# Patient Record
Sex: MALE | Race: BLACK OR AFRICAN AMERICAN | NOT HISPANIC OR LATINO | Employment: OTHER | URBAN - METROPOLITAN AREA
[De-identification: names, ages, dates, MRNs, and addresses within clinical notes are randomized per-mention and may not be internally consistent; named-entity substitution may affect disease eponyms.]

---

## 2022-01-01 ENCOUNTER — HOSPITAL ENCOUNTER (INPATIENT)
Facility: HOSPITAL | Age: 57
LOS: 9 days | Discharge: HOME | DRG: 291 | End: 2022-11-29
Attending: EMERGENCY MEDICINE | Admitting: HOSPITALIST
Payer: MEDICARE

## 2022-01-01 ENCOUNTER — PATIENT OUTREACH (OUTPATIENT)
Dept: ADMINISTRATIVE | Facility: CLINIC | Age: 57
End: 2022-01-01
Payer: MEDICARE

## 2022-01-01 ENCOUNTER — HOSPITAL ENCOUNTER (INPATIENT)
Facility: HOSPITAL | Age: 57
LOS: 2 days | Discharge: HOME | DRG: 150 | End: 2022-12-22
Attending: EMERGENCY MEDICINE | Admitting: EMERGENCY MEDICINE
Payer: MEDICARE

## 2022-01-01 VITALS
WEIGHT: 187 LBS | HEIGHT: 69 IN | OXYGEN SATURATION: 90 % | HEART RATE: 78 BPM | TEMPERATURE: 98 F | BODY MASS INDEX: 27.7 KG/M2 | DIASTOLIC BLOOD PRESSURE: 63 MMHG | RESPIRATION RATE: 18 BRPM | SYSTOLIC BLOOD PRESSURE: 132 MMHG

## 2022-01-01 VITALS
WEIGHT: 171.94 LBS | BODY MASS INDEX: 25.47 KG/M2 | HEIGHT: 69 IN | TEMPERATURE: 97 F | OXYGEN SATURATION: 91 % | HEART RATE: 71 BPM | DIASTOLIC BLOOD PRESSURE: 89 MMHG | SYSTOLIC BLOOD PRESSURE: 180 MMHG | RESPIRATION RATE: 17 BRPM

## 2022-01-01 DIAGNOSIS — E87.70 HYPERVOLEMIA, UNSPECIFIED HYPERVOLEMIA TYPE: ICD-10-CM

## 2022-01-01 DIAGNOSIS — I50.21 ACUTE HFREF (HEART FAILURE WITH REDUCED EJECTION FRACTION): ICD-10-CM

## 2022-01-01 DIAGNOSIS — J96.21 ACUTE ON CHRONIC RESPIRATORY FAILURE WITH HYPOXIA: Primary | ICD-10-CM

## 2022-01-01 DIAGNOSIS — N18.6 ESRD ON DIALYSIS: Primary | ICD-10-CM

## 2022-01-01 DIAGNOSIS — E78.5 HYPERLIPIDEMIA, UNSPECIFIED HYPERLIPIDEMIA TYPE: ICD-10-CM

## 2022-01-01 DIAGNOSIS — N18.6 TYPE 1 DIABETES MELLITUS WITH CHRONIC KIDNEY DISEASE ON CHRONIC DIALYSIS: ICD-10-CM

## 2022-01-01 DIAGNOSIS — I26.94 MULTIPLE SUBSEGMENTAL PULMONARY EMBOLI WITHOUT ACUTE COR PULMONALE: ICD-10-CM

## 2022-01-01 DIAGNOSIS — R07.9 CHEST PAIN: ICD-10-CM

## 2022-01-01 DIAGNOSIS — N18.9 CHRONIC KIDNEY DISEASE-MINERAL AND BONE DISORDER: ICD-10-CM

## 2022-01-01 DIAGNOSIS — Z99.2 TYPE 1 DIABETES MELLITUS WITH CHRONIC KIDNEY DISEASE ON CHRONIC DIALYSIS: ICD-10-CM

## 2022-01-01 DIAGNOSIS — M89.9 CHRONIC KIDNEY DISEASE-MINERAL AND BONE DISORDER: ICD-10-CM

## 2022-01-01 DIAGNOSIS — R06.02 SOB (SHORTNESS OF BREATH): ICD-10-CM

## 2022-01-01 DIAGNOSIS — E83.9 CHRONIC KIDNEY DISEASE-MINERAL AND BONE DISORDER: ICD-10-CM

## 2022-01-01 DIAGNOSIS — I15.0 RENOVASCULAR HYPERTENSION: ICD-10-CM

## 2022-01-01 DIAGNOSIS — R06.02 SHORTNESS OF BREATH: ICD-10-CM

## 2022-01-01 DIAGNOSIS — N18.6 ESRD ON DIALYSIS: ICD-10-CM

## 2022-01-01 DIAGNOSIS — E10.22 TYPE 1 DIABETES MELLITUS WITH CHRONIC KIDNEY DISEASE ON CHRONIC DIALYSIS: ICD-10-CM

## 2022-01-01 DIAGNOSIS — I50.21 ACUTE SYSTOLIC HEART FAILURE: ICD-10-CM

## 2022-01-01 DIAGNOSIS — Z99.2 ESRD ON DIALYSIS: ICD-10-CM

## 2022-01-01 DIAGNOSIS — Z99.2 ESRD ON DIALYSIS: Primary | ICD-10-CM

## 2022-01-01 LAB
ALBUMIN SERPL BCP-MCNC: 2.5 G/DL (ref 3.5–5.2)
ALBUMIN SERPL BCP-MCNC: 2.5 G/DL (ref 3.5–5.2)
ALBUMIN SERPL BCP-MCNC: 2.6 G/DL (ref 3.5–5.2)
ALBUMIN SERPL BCP-MCNC: 2.7 G/DL (ref 3.5–5.2)
ALLENS TEST: ABNORMAL
ALP SERPL-CCNC: 200 U/L (ref 55–135)
ALP SERPL-CCNC: 214 U/L (ref 55–135)
ALP SERPL-CCNC: 224 U/L (ref 55–135)
ALP SERPL-CCNC: 234 U/L (ref 55–135)
ALP SERPL-CCNC: 244 U/L (ref 55–135)
ALP SERPL-CCNC: 250 U/L (ref 55–135)
ALP SERPL-CCNC: 258 U/L (ref 55–135)
ALT SERPL W/O P-5'-P-CCNC: 22 U/L (ref 10–44)
ALT SERPL W/O P-5'-P-CCNC: 25 U/L (ref 10–44)
ALT SERPL W/O P-5'-P-CCNC: 26 U/L (ref 10–44)
ALT SERPL W/O P-5'-P-CCNC: 28 U/L (ref 10–44)
ALT SERPL W/O P-5'-P-CCNC: 29 U/L (ref 10–44)
ALT SERPL W/O P-5'-P-CCNC: 35 U/L (ref 10–44)
ALT SERPL W/O P-5'-P-CCNC: 36 U/L (ref 10–44)
ANION GAP SERPL CALC-SCNC: 10 MMOL/L (ref 8–16)
ANION GAP SERPL CALC-SCNC: 11 MMOL/L (ref 8–16)
ANION GAP SERPL CALC-SCNC: 11 MMOL/L (ref 8–16)
ANION GAP SERPL CALC-SCNC: 12 MMOL/L (ref 8–16)
ANION GAP SERPL CALC-SCNC: 13 MMOL/L (ref 8–16)
ANION GAP SERPL CALC-SCNC: 14 MMOL/L (ref 8–16)
ANION GAP SERPL CALC-SCNC: 15 MMOL/L (ref 8–16)
ANION GAP SERPL CALC-SCNC: 8 MMOL/L (ref 8–16)
ANION GAP SERPL CALC-SCNC: 9 MMOL/L (ref 8–16)
ANION GAP SERPL CALC-SCNC: 9 MMOL/L (ref 8–16)
APTT BLDCRRT: 49.7 SEC (ref 21–32)
ASCENDING AORTA: 3.44 CM
AST SERPL-CCNC: 27 U/L (ref 10–40)
AST SERPL-CCNC: 30 U/L (ref 10–40)
AST SERPL-CCNC: 35 U/L (ref 10–40)
AST SERPL-CCNC: 37 U/L (ref 10–40)
AST SERPL-CCNC: 37 U/L (ref 10–40)
AST SERPL-CCNC: 38 U/L (ref 10–40)
AST SERPL-CCNC: 91 U/L (ref 10–40)
AV INDEX (PROSTH): 0.63
AV MEAN GRADIENT: 9 MMHG
AV PEAK GRADIENT: 17 MMHG
AV VALVE AREA: 2.88 CM2
AV VELOCITY RATIO: 0.65
B-OH-BUTYR BLD STRIP-SCNC: 1.9 MMOL/L (ref 0–0.5)
BASOPHILS # BLD AUTO: 0.01 K/UL (ref 0–0.2)
BASOPHILS # BLD AUTO: 0.02 K/UL (ref 0–0.2)
BASOPHILS # BLD AUTO: 0.03 K/UL (ref 0–0.2)
BASOPHILS # BLD AUTO: 0.03 K/UL (ref 0–0.2)
BASOPHILS # BLD AUTO: 0.04 K/UL (ref 0–0.2)
BASOPHILS NFR BLD: 0.2 % (ref 0–1.9)
BASOPHILS NFR BLD: 0.3 % (ref 0–1.9)
BASOPHILS NFR BLD: 0.4 % (ref 0–1.9)
BASOPHILS NFR BLD: 0.5 % (ref 0–1.9)
BASOPHILS NFR BLD: 0.5 % (ref 0–1.9)
BASOPHILS NFR BLD: 0.7 % (ref 0–1.9)
BASOPHILS NFR BLD: 0.8 % (ref 0–1.9)
BASOPHILS NFR BLD: 0.9 % (ref 0–1.9)
BILIRUB SERPL-MCNC: 0.6 MG/DL (ref 0.1–1)
BILIRUB SERPL-MCNC: 0.6 MG/DL (ref 0.1–1)
BILIRUB SERPL-MCNC: 0.7 MG/DL (ref 0.1–1)
BNP SERPL-MCNC: 1701 PG/ML (ref 0–99)
BNP SERPL-MCNC: 2255 PG/ML (ref 0–99)
BNP SERPL-MCNC: 3024 PG/ML (ref 0–99)
BSA FOR ECHO PROCEDURE: 2.03 M2
BUN SERPL-MCNC: 23 MG/DL (ref 6–20)
BUN SERPL-MCNC: 29 MG/DL (ref 6–20)
BUN SERPL-MCNC: 30 MG/DL (ref 6–20)
BUN SERPL-MCNC: 32 MG/DL (ref 6–30)
BUN SERPL-MCNC: 33 MG/DL (ref 6–20)
BUN SERPL-MCNC: 36 MG/DL (ref 6–20)
BUN SERPL-MCNC: 36 MG/DL (ref 6–20)
BUN SERPL-MCNC: 38 MG/DL (ref 6–20)
BUN SERPL-MCNC: 41 MG/DL (ref 6–20)
BUN SERPL-MCNC: 44 MG/DL (ref 6–20)
BUN SERPL-MCNC: 46 MG/DL (ref 6–20)
CALCIUM SERPL-MCNC: 7.6 MG/DL (ref 8.7–10.5)
CALCIUM SERPL-MCNC: 7.6 MG/DL (ref 8.7–10.5)
CALCIUM SERPL-MCNC: 7.8 MG/DL (ref 8.7–10.5)
CALCIUM SERPL-MCNC: 7.9 MG/DL (ref 8.7–10.5)
CALCIUM SERPL-MCNC: 8 MG/DL (ref 8.7–10.5)
CALCIUM SERPL-MCNC: 8.1 MG/DL (ref 8.7–10.5)
CALCIUM SERPL-MCNC: 8.1 MG/DL (ref 8.7–10.5)
CALCIUM SERPL-MCNC: 8.2 MG/DL (ref 8.7–10.5)
CALCIUM SERPL-MCNC: 8.3 MG/DL (ref 8.7–10.5)
CHLORIDE SERPL-SCNC: 101 MMOL/L (ref 95–110)
CHLORIDE SERPL-SCNC: 101 MMOL/L (ref 95–110)
CHLORIDE SERPL-SCNC: 102 MMOL/L (ref 95–110)
CHLORIDE SERPL-SCNC: 104 MMOL/L (ref 95–110)
CHLORIDE SERPL-SCNC: 104 MMOL/L (ref 95–110)
CHLORIDE SERPL-SCNC: 92 MMOL/L (ref 95–110)
CHLORIDE SERPL-SCNC: 93 MMOL/L (ref 95–110)
CHLORIDE SERPL-SCNC: 94 MMOL/L (ref 95–110)
CHLORIDE SERPL-SCNC: 95 MMOL/L (ref 95–110)
CHLORIDE SERPL-SCNC: 97 MMOL/L (ref 95–110)
CHLORIDE SERPL-SCNC: 97 MMOL/L (ref 95–110)
CO2 SERPL-SCNC: 20 MMOL/L (ref 23–29)
CO2 SERPL-SCNC: 21 MMOL/L (ref 23–29)
CO2 SERPL-SCNC: 23 MMOL/L (ref 23–29)
CO2 SERPL-SCNC: 24 MMOL/L (ref 23–29)
CO2 SERPL-SCNC: 24 MMOL/L (ref 23–29)
CO2 SERPL-SCNC: 25 MMOL/L (ref 23–29)
CO2 SERPL-SCNC: 26 MMOL/L (ref 23–29)
CO2 SERPL-SCNC: 27 MMOL/L (ref 23–29)
CREAT SERPL-MCNC: 4.3 MG/DL (ref 0.5–1.4)
CREAT SERPL-MCNC: 4.3 MG/DL (ref 0.5–1.4)
CREAT SERPL-MCNC: 4.5 MG/DL (ref 0.5–1.4)
CREAT SERPL-MCNC: 4.6 MG/DL (ref 0.5–1.4)
CREAT SERPL-MCNC: 4.7 MG/DL (ref 0.5–1.4)
CREAT SERPL-MCNC: 4.7 MG/DL (ref 0.5–1.4)
CREAT SERPL-MCNC: 4.9 MG/DL (ref 0.5–1.4)
CREAT SERPL-MCNC: 4.9 MG/DL (ref 0.5–1.4)
CREAT SERPL-MCNC: 5 MG/DL (ref 0.5–1.4)
CREAT SERPL-MCNC: 5.3 MG/DL (ref 0.5–1.4)
CREAT SERPL-MCNC: 5.4 MG/DL (ref 0.5–1.4)
CREAT SERPL-MCNC: 5.5 MG/DL (ref 0.5–1.4)
CREAT SERPL-MCNC: 5.8 MG/DL (ref 0.5–1.4)
CV ECHO LV RWT: 0.45 CM
DIFFERENTIAL METHOD: ABNORMAL
DOP CALC AO PEAK VEL: 2.07 M/S
DOP CALC AO VTI: 41.89 CM
DOP CALC LVOT AREA: 4.6 CM2
DOP CALC LVOT DIAMETER: 2.41 CM
DOP CALC LVOT PEAK VEL: 1.35 M/S
DOP CALC LVOT STROKE VOLUME: 120.82 CM3
DOP CALCLVOT PEAK VEL VTI: 26.5 CM
E WAVE DECELERATION TIME: 156.12 MSEC
E/A RATIO: 1.11
E/E' RATIO: 21.64 M/S
ECHO LV POSTERIOR WALL: 1.18 CM (ref 0.6–1.1)
EJECTION FRACTION: 63 %
EOSINOPHIL # BLD AUTO: 0 K/UL (ref 0–0.5)
EOSINOPHIL # BLD AUTO: 0 K/UL (ref 0–0.5)
EOSINOPHIL # BLD AUTO: 0.1 K/UL (ref 0–0.5)
EOSINOPHIL NFR BLD: 0.6 % (ref 0–8)
EOSINOPHIL NFR BLD: 0.8 % (ref 0–8)
EOSINOPHIL NFR BLD: 0.9 % (ref 0–8)
EOSINOPHIL NFR BLD: 1.3 % (ref 0–8)
EOSINOPHIL NFR BLD: 1.4 % (ref 0–8)
EOSINOPHIL NFR BLD: 1.5 % (ref 0–8)
EOSINOPHIL NFR BLD: 1.6 % (ref 0–8)
EOSINOPHIL NFR BLD: 1.7 % (ref 0–8)
EOSINOPHIL NFR BLD: 1.8 % (ref 0–8)
ERYTHROCYTE [DISTWIDTH] IN BLOOD BY AUTOMATED COUNT: 17 % (ref 11.5–14.5)
ERYTHROCYTE [DISTWIDTH] IN BLOOD BY AUTOMATED COUNT: 17.1 % (ref 11.5–14.5)
ERYTHROCYTE [DISTWIDTH] IN BLOOD BY AUTOMATED COUNT: 17.2 % (ref 11.5–14.5)
ERYTHROCYTE [DISTWIDTH] IN BLOOD BY AUTOMATED COUNT: 17.3 % (ref 11.5–14.5)
ERYTHROCYTE [DISTWIDTH] IN BLOOD BY AUTOMATED COUNT: 17.3 % (ref 11.5–14.5)
ERYTHROCYTE [DISTWIDTH] IN BLOOD BY AUTOMATED COUNT: 17.4 % (ref 11.5–14.5)
ERYTHROCYTE [DISTWIDTH] IN BLOOD BY AUTOMATED COUNT: 17.5 % (ref 11.5–14.5)
ERYTHROCYTE [DISTWIDTH] IN BLOOD BY AUTOMATED COUNT: 17.9 % (ref 11.5–14.5)
EST. GFR  (NO RACE VARIABLE): 10.5 ML/MIN/1.73 M^2
EST. GFR  (NO RACE VARIABLE): 11.2 ML/MIN/1.73 M^2
EST. GFR  (NO RACE VARIABLE): 11.5 ML/MIN/1.73 M^2
EST. GFR  (NO RACE VARIABLE): 12.6 ML/MIN/1.73 M^2
EST. GFR  (NO RACE VARIABLE): 12.9 ML/MIN/1.73 M^2
EST. GFR  (NO RACE VARIABLE): 12.9 ML/MIN/1.73 M^2
EST. GFR  (NO RACE VARIABLE): 13.5 ML/MIN/1.73 M^2
EST. GFR  (NO RACE VARIABLE): 13.5 ML/MIN/1.73 M^2
EST. GFR  (NO RACE VARIABLE): 13.9 ML/MIN/1.73 M^2
EST. GFR  (NO RACE VARIABLE): 14.3 ML/MIN/1.73 M^2
EST. GFR  (NO RACE VARIABLE): 15.1 ML/MIN/1.73 M^2
EST. GFR  (NO RACE VARIABLE): 15.1 ML/MIN/1.73 M^2
ESTIMATED AVG GLUCOSE: 169 MG/DL (ref 68–131)
FRACTIONAL SHORTENING: 34 % (ref 28–44)
GLUCOSE SERPL-MCNC: 151 MG/DL (ref 70–110)
GLUCOSE SERPL-MCNC: 152 MG/DL (ref 70–110)
GLUCOSE SERPL-MCNC: 180 MG/DL (ref 70–110)
GLUCOSE SERPL-MCNC: 184 MG/DL (ref 70–110)
GLUCOSE SERPL-MCNC: 211 MG/DL (ref 70–110)
GLUCOSE SERPL-MCNC: 218 MG/DL (ref 70–110)
GLUCOSE SERPL-MCNC: 230 MG/DL (ref 70–110)
GLUCOSE SERPL-MCNC: 239 MG/DL (ref 70–110)
GLUCOSE SERPL-MCNC: 369 MG/DL (ref 70–110)
GLUCOSE SERPL-MCNC: 387 MG/DL (ref 70–110)
GLUCOSE SERPL-MCNC: 417 MG/DL (ref 70–110)
GLUCOSE SERPL-MCNC: 476 MG/DL (ref 70–110)
GLUCOSE SERPL-MCNC: 476 MG/DL (ref 70–110)
GLUCOSE SERPL-MCNC: 499 MG/DL (ref 70–110)
GLUCOSE SERPL-MCNC: 53 MG/DL (ref 70–110)
GLUCOSE SERPL-MCNC: 90 MG/DL (ref 70–110)
HBA1C MFR BLD: 7.5 % (ref 4–5.6)
HBV SURFACE AG SERPL QL IA: NORMAL
HCO3 UR-SCNC: 29.1 MMOL/L (ref 24–28)
HCT VFR BLD AUTO: 28.1 % (ref 40–54)
HCT VFR BLD AUTO: 29.2 % (ref 40–54)
HCT VFR BLD AUTO: 29.2 % (ref 40–54)
HCT VFR BLD AUTO: 29.9 % (ref 40–54)
HCT VFR BLD AUTO: 29.9 % (ref 40–54)
HCT VFR BLD AUTO: 30 % (ref 40–54)
HCT VFR BLD AUTO: 31.1 % (ref 40–54)
HCT VFR BLD AUTO: 31.7 % (ref 40–54)
HCT VFR BLD AUTO: 31.8 % (ref 40–54)
HCT VFR BLD AUTO: 34.1 % (ref 40–54)
HCT VFR BLD AUTO: 34.2 % (ref 40–54)
HCT VFR BLD CALC: 33 %PCV (ref 36–54)
HGB BLD-MCNC: 10.1 G/DL (ref 14–18)
HGB BLD-MCNC: 10.3 G/DL (ref 14–18)
HGB BLD-MCNC: 10.4 G/DL (ref 14–18)
HGB BLD-MCNC: 8.8 G/DL (ref 14–18)
HGB BLD-MCNC: 8.9 G/DL (ref 14–18)
HGB BLD-MCNC: 9 G/DL (ref 14–18)
HGB BLD-MCNC: 9.1 G/DL (ref 14–18)
HGB BLD-MCNC: 9.3 G/DL (ref 14–18)
HGB BLD-MCNC: 9.3 G/DL (ref 14–18)
HGB BLD-MCNC: 9.7 G/DL (ref 14–18)
HGB BLD-MCNC: 9.7 G/DL (ref 14–18)
HIV 1+2 AB+HIV1 P24 AG SERPL QL IA: NORMAL
IMM GRANULOCYTES # BLD AUTO: 0 K/UL (ref 0–0.04)
IMM GRANULOCYTES # BLD AUTO: 0 K/UL (ref 0–0.04)
IMM GRANULOCYTES # BLD AUTO: 0.01 K/UL (ref 0–0.04)
IMM GRANULOCYTES # BLD AUTO: 0.02 K/UL (ref 0–0.04)
IMM GRANULOCYTES # BLD AUTO: 0.05 K/UL (ref 0–0.04)
IMM GRANULOCYTES NFR BLD AUTO: 0 % (ref 0–0.5)
IMM GRANULOCYTES NFR BLD AUTO: 0 % (ref 0–0.5)
IMM GRANULOCYTES NFR BLD AUTO: 0.2 % (ref 0–0.5)
IMM GRANULOCYTES NFR BLD AUTO: 0.2 % (ref 0–0.5)
IMM GRANULOCYTES NFR BLD AUTO: 0.3 % (ref 0–0.5)
IMM GRANULOCYTES NFR BLD AUTO: 0.3 % (ref 0–0.5)
IMM GRANULOCYTES NFR BLD AUTO: 0.4 % (ref 0–0.5)
IMM GRANULOCYTES NFR BLD AUTO: 0.5 % (ref 0–0.5)
IMM GRANULOCYTES NFR BLD AUTO: 0.9 % (ref 0–0.5)
INFLUENZA A, MOLECULAR: NOT DETECTED
INFLUENZA B, MOLECULAR: NOT DETECTED
INR PPP: 1.4 (ref 0.8–1.2)
INTERVENTRICULAR SEPTUM: 1.22 CM (ref 0.6–1.1)
LA MAJOR: 6.57 CM
LA MINOR: 6.9 CM
LA WIDTH: 5.29 CM
LEFT ATRIUM SIZE: 4.87 CM
LEFT ATRIUM VOLUME INDEX MOD: 58.6 ML/M2
LEFT ATRIUM VOLUME INDEX: 73.3 ML/M2
LEFT ATRIUM VOLUME MOD: 117.73 CM3
LEFT ATRIUM VOLUME: 147.39 CM3
LEFT INTERNAL DIMENSION IN SYSTOLE: 3.46 CM (ref 2.1–4)
LEFT VENTRICLE DIASTOLIC VOLUME INDEX: 65.22 ML/M2
LEFT VENTRICLE DIASTOLIC VOLUME: 131.1 ML
LEFT VENTRICLE MASS INDEX: 125 G/M2
LEFT VENTRICLE SYSTOLIC VOLUME INDEX: 24.7 ML/M2
LEFT VENTRICLE SYSTOLIC VOLUME: 49.56 ML
LEFT VENTRICULAR INTERNAL DIMENSION IN DIASTOLE: 5.23 CM (ref 3.5–6)
LEFT VENTRICULAR MASS: 251.15 G
LV LATERAL E/E' RATIO: 14.88 M/S
LV SEPTAL E/E' RATIO: 39.67 M/S
LYMPHOCYTES # BLD AUTO: 0.4 K/UL (ref 1–4.8)
LYMPHOCYTES # BLD AUTO: 0.5 K/UL (ref 1–4.8)
LYMPHOCYTES # BLD AUTO: 0.6 K/UL (ref 1–4.8)
LYMPHOCYTES # BLD AUTO: 0.7 K/UL (ref 1–4.8)
LYMPHOCYTES # BLD AUTO: 0.7 K/UL (ref 1–4.8)
LYMPHOCYTES NFR BLD: 10.1 % (ref 18–48)
LYMPHOCYTES NFR BLD: 10.6 % (ref 18–48)
LYMPHOCYTES NFR BLD: 10.7 % (ref 18–48)
LYMPHOCYTES NFR BLD: 11.5 % (ref 18–48)
LYMPHOCYTES NFR BLD: 12.8 % (ref 18–48)
LYMPHOCYTES NFR BLD: 13.6 % (ref 18–48)
LYMPHOCYTES NFR BLD: 13.7 % (ref 18–48)
LYMPHOCYTES NFR BLD: 14 % (ref 18–48)
LYMPHOCYTES NFR BLD: 17.4 % (ref 18–48)
LYMPHOCYTES NFR BLD: 8.1 % (ref 18–48)
LYMPHOCYTES NFR BLD: 8.5 % (ref 18–48)
MAGNESIUM SERPL-MCNC: 2.2 MG/DL (ref 1.6–2.6)
MAGNESIUM SERPL-MCNC: 2.3 MG/DL (ref 1.6–2.6)
MAGNESIUM SERPL-MCNC: 2.4 MG/DL (ref 1.6–2.6)
MCH RBC QN AUTO: 27.8 PG (ref 27–31)
MCH RBC QN AUTO: 28.3 PG (ref 27–31)
MCH RBC QN AUTO: 28.5 PG (ref 27–31)
MCH RBC QN AUTO: 28.6 PG (ref 27–31)
MCH RBC QN AUTO: 28.6 PG (ref 27–31)
MCH RBC QN AUTO: 28.7 PG (ref 27–31)
MCH RBC QN AUTO: 28.7 PG (ref 27–31)
MCH RBC QN AUTO: 28.8 PG (ref 27–31)
MCH RBC QN AUTO: 28.9 PG (ref 27–31)
MCH RBC QN AUTO: 28.9 PG (ref 27–31)
MCH RBC QN AUTO: 29 PG (ref 27–31)
MCHC RBC AUTO-ENTMCNC: 30.1 G/DL (ref 32–36)
MCHC RBC AUTO-ENTMCNC: 30.2 G/DL (ref 32–36)
MCHC RBC AUTO-ENTMCNC: 30.3 G/DL (ref 32–36)
MCHC RBC AUTO-ENTMCNC: 30.4 G/DL (ref 32–36)
MCHC RBC AUTO-ENTMCNC: 30.6 G/DL (ref 32–36)
MCHC RBC AUTO-ENTMCNC: 30.8 G/DL (ref 32–36)
MCHC RBC AUTO-ENTMCNC: 31.1 G/DL (ref 32–36)
MCHC RBC AUTO-ENTMCNC: 31.1 G/DL (ref 32–36)
MCHC RBC AUTO-ENTMCNC: 31.2 G/DL (ref 32–36)
MCHC RBC AUTO-ENTMCNC: 31.7 G/DL (ref 32–36)
MCHC RBC AUTO-ENTMCNC: 31.8 G/DL (ref 32–36)
MCV RBC AUTO: 90 FL (ref 82–98)
MCV RBC AUTO: 91 FL (ref 82–98)
MCV RBC AUTO: 92 FL (ref 82–98)
MCV RBC AUTO: 92 FL (ref 82–98)
MCV RBC AUTO: 93 FL (ref 82–98)
MCV RBC AUTO: 95 FL (ref 82–98)
MCV RBC AUTO: 96 FL (ref 82–98)
MONOCYTES # BLD AUTO: 0.4 K/UL (ref 0.3–1)
MONOCYTES # BLD AUTO: 0.5 K/UL (ref 0.3–1)
MONOCYTES # BLD AUTO: 0.5 K/UL (ref 0.3–1)
MONOCYTES # BLD AUTO: 0.6 K/UL (ref 0.3–1)
MONOCYTES # BLD AUTO: 0.6 K/UL (ref 0.3–1)
MONOCYTES # BLD AUTO: 0.7 K/UL (ref 0.3–1)
MONOCYTES NFR BLD: 10 % (ref 4–15)
MONOCYTES NFR BLD: 10.2 % (ref 4–15)
MONOCYTES NFR BLD: 10.4 % (ref 4–15)
MONOCYTES NFR BLD: 11 % (ref 4–15)
MONOCYTES NFR BLD: 11.2 % (ref 4–15)
MONOCYTES NFR BLD: 12.2 % (ref 4–15)
MONOCYTES NFR BLD: 13.5 % (ref 4–15)
MONOCYTES NFR BLD: 13.8 % (ref 4–15)
MONOCYTES NFR BLD: 14.3 % (ref 4–15)
MONOCYTES NFR BLD: 9.3 % (ref 4–15)
MONOCYTES NFR BLD: 9.6 % (ref 4–15)
MV PEAK A VEL: 1.07 M/S
MV PEAK E VEL: 1.19 M/S
MV STENOSIS PRESSURE HALF TIME: 45.28 MS
MV VALVE AREA P 1/2 METHOD: 4.86 CM2
NEUTROPHILS # BLD AUTO: 2.5 K/UL (ref 1.8–7.7)
NEUTROPHILS # BLD AUTO: 2.7 K/UL (ref 1.8–7.7)
NEUTROPHILS # BLD AUTO: 2.7 K/UL (ref 1.8–7.7)
NEUTROPHILS # BLD AUTO: 2.8 K/UL (ref 1.8–7.7)
NEUTROPHILS # BLD AUTO: 3.5 K/UL (ref 1.8–7.7)
NEUTROPHILS # BLD AUTO: 3.7 K/UL (ref 1.8–7.7)
NEUTROPHILS # BLD AUTO: 3.7 K/UL (ref 1.8–7.7)
NEUTROPHILS # BLD AUTO: 3.9 K/UL (ref 1.8–7.7)
NEUTROPHILS # BLD AUTO: 4.1 K/UL (ref 1.8–7.7)
NEUTROPHILS # BLD AUTO: 4.2 K/UL (ref 1.8–7.7)
NEUTROPHILS # BLD AUTO: 5 K/UL (ref 1.8–7.7)
NEUTROPHILS NFR BLD: 69.3 % (ref 38–73)
NEUTROPHILS NFR BLD: 71.6 % (ref 38–73)
NEUTROPHILS NFR BLD: 71.6 % (ref 38–73)
NEUTROPHILS NFR BLD: 72.3 % (ref 38–73)
NEUTROPHILS NFR BLD: 72.8 % (ref 38–73)
NEUTROPHILS NFR BLD: 73.2 % (ref 38–73)
NEUTROPHILS NFR BLD: 74.2 % (ref 38–73)
NEUTROPHILS NFR BLD: 77.3 % (ref 38–73)
NEUTROPHILS NFR BLD: 77.8 % (ref 38–73)
NEUTROPHILS NFR BLD: 79.5 % (ref 38–73)
NEUTROPHILS NFR BLD: 80 % (ref 38–73)
NRBC BLD-RTO: 0 /100 WBC
PCO2 BLDA: 46.9 MMHG (ref 35–45)
PH SMN: 7.4 [PH] (ref 7.35–7.45)
PHOSPHATE SERPL-MCNC: 1.8 MG/DL (ref 2.7–4.5)
PHOSPHATE SERPL-MCNC: 2 MG/DL (ref 2.7–4.5)
PHOSPHATE SERPL-MCNC: 2.4 MG/DL (ref 2.7–4.5)
PHOSPHATE SERPL-MCNC: 2.5 MG/DL (ref 2.7–4.5)
PHOSPHATE SERPL-MCNC: 2.5 MG/DL (ref 2.7–4.5)
PHOSPHATE SERPL-MCNC: 2.6 MG/DL (ref 2.7–4.5)
PHOSPHATE SERPL-MCNC: 2.7 MG/DL (ref 2.7–4.5)
PHOSPHATE SERPL-MCNC: 3.4 MG/DL (ref 2.7–4.5)
PISA TR MAX VEL: 3.61 M/S
PLATELET # BLD AUTO: 172 K/UL (ref 150–450)
PLATELET # BLD AUTO: 185 K/UL (ref 150–450)
PLATELET # BLD AUTO: 193 K/UL (ref 150–450)
PLATELET # BLD AUTO: 199 K/UL (ref 150–450)
PLATELET # BLD AUTO: 214 K/UL (ref 150–450)
PLATELET # BLD AUTO: 219 K/UL (ref 150–450)
PLATELET # BLD AUTO: 223 K/UL (ref 150–450)
PLATELET # BLD AUTO: 238 K/UL (ref 150–450)
PLATELET # BLD AUTO: 239 K/UL (ref 150–450)
PLATELET # BLD AUTO: 241 K/UL (ref 150–450)
PLATELET # BLD AUTO: 259 K/UL (ref 150–450)
PMV BLD AUTO: 10.1 FL (ref 9.2–12.9)
PMV BLD AUTO: 10.5 FL (ref 9.2–12.9)
PMV BLD AUTO: 10.8 FL (ref 9.2–12.9)
PMV BLD AUTO: 10.8 FL (ref 9.2–12.9)
PMV BLD AUTO: 10.9 FL (ref 9.2–12.9)
PMV BLD AUTO: 11.2 FL (ref 9.2–12.9)
PMV BLD AUTO: 11.2 FL (ref 9.2–12.9)
PMV BLD AUTO: 11.4 FL (ref 9.2–12.9)
PMV BLD AUTO: 11.6 FL (ref 9.2–12.9)
PMV BLD AUTO: 11.9 FL (ref 9.2–12.9)
PMV BLD AUTO: 11.9 FL (ref 9.2–12.9)
PO2 BLDA: 32 MMHG (ref 40–60)
POC BE: 4 MMOL/L
POC IONIZED CALCIUM: 0.94 MMOL/L (ref 1.06–1.42)
POC SATURATED O2: 60 % (ref 95–100)
POC TCO2 (MEASURED): 31 MMOL/L (ref 23–29)
POC TCO2: 31 MMOL/L (ref 24–29)
POCT GLUCOSE: 112 MG/DL (ref 70–110)
POCT GLUCOSE: 121 MG/DL (ref 70–110)
POCT GLUCOSE: 129 MG/DL (ref 70–110)
POCT GLUCOSE: 130 MG/DL (ref 70–110)
POCT GLUCOSE: 137 MG/DL (ref 70–110)
POCT GLUCOSE: 139 MG/DL (ref 70–110)
POCT GLUCOSE: 141 MG/DL (ref 70–110)
POCT GLUCOSE: 142 MG/DL (ref 70–110)
POCT GLUCOSE: 147 MG/DL (ref 70–110)
POCT GLUCOSE: 148 MG/DL (ref 70–110)
POCT GLUCOSE: 155 MG/DL (ref 70–110)
POCT GLUCOSE: 161 MG/DL (ref 70–110)
POCT GLUCOSE: 161 MG/DL (ref 70–110)
POCT GLUCOSE: 163 MG/DL (ref 70–110)
POCT GLUCOSE: 164 MG/DL (ref 70–110)
POCT GLUCOSE: 174 MG/DL (ref 70–110)
POCT GLUCOSE: 180 MG/DL (ref 70–110)
POCT GLUCOSE: 185 MG/DL (ref 70–110)
POCT GLUCOSE: 196 MG/DL (ref 70–110)
POCT GLUCOSE: 197 MG/DL (ref 70–110)
POCT GLUCOSE: 198 MG/DL (ref 70–110)
POCT GLUCOSE: 201 MG/DL (ref 70–110)
POCT GLUCOSE: 201 MG/DL (ref 70–110)
POCT GLUCOSE: 203 MG/DL (ref 70–110)
POCT GLUCOSE: 212 MG/DL (ref 70–110)
POCT GLUCOSE: 219 MG/DL (ref 70–110)
POCT GLUCOSE: 222 MG/DL (ref 70–110)
POCT GLUCOSE: 226 MG/DL (ref 70–110)
POCT GLUCOSE: 226 MG/DL (ref 70–110)
POCT GLUCOSE: 236 MG/DL (ref 70–110)
POCT GLUCOSE: 240 MG/DL (ref 70–110)
POCT GLUCOSE: 246 MG/DL (ref 70–110)
POCT GLUCOSE: 249 MG/DL (ref 70–110)
POCT GLUCOSE: 250 MG/DL (ref 70–110)
POCT GLUCOSE: 255 MG/DL (ref 70–110)
POCT GLUCOSE: 259 MG/DL (ref 70–110)
POCT GLUCOSE: 264 MG/DL (ref 70–110)
POCT GLUCOSE: 269 MG/DL (ref 70–110)
POCT GLUCOSE: 274 MG/DL (ref 70–110)
POCT GLUCOSE: 276 MG/DL (ref 70–110)
POCT GLUCOSE: 285 MG/DL (ref 70–110)
POCT GLUCOSE: 288 MG/DL (ref 70–110)
POCT GLUCOSE: 292 MG/DL (ref 70–110)
POCT GLUCOSE: 300 MG/DL (ref 70–110)
POCT GLUCOSE: 300 MG/DL (ref 70–110)
POCT GLUCOSE: 306 MG/DL (ref 70–110)
POCT GLUCOSE: 320 MG/DL (ref 70–110)
POCT GLUCOSE: 340 MG/DL (ref 70–110)
POCT GLUCOSE: 347 MG/DL (ref 70–110)
POCT GLUCOSE: 348 MG/DL (ref 70–110)
POCT GLUCOSE: 361 MG/DL (ref 70–110)
POCT GLUCOSE: 362 MG/DL (ref 70–110)
POCT GLUCOSE: 373 MG/DL (ref 70–110)
POCT GLUCOSE: 387 MG/DL (ref 70–110)
POCT GLUCOSE: 39 MG/DL (ref 70–110)
POCT GLUCOSE: 411 MG/DL (ref 70–110)
POCT GLUCOSE: 418 MG/DL (ref 70–110)
POCT GLUCOSE: 42 MG/DL (ref 70–110)
POCT GLUCOSE: 476 MG/DL (ref 70–110)
POCT GLUCOSE: 476 MG/DL (ref 70–110)
POCT GLUCOSE: 48 MG/DL (ref 70–110)
POCT GLUCOSE: 64 MG/DL (ref 70–110)
POCT GLUCOSE: 83 MG/DL (ref 70–110)
POCT GLUCOSE: 88 MG/DL (ref 70–110)
POCT GLUCOSE: 91 MG/DL (ref 70–110)
POCT GLUCOSE: 97 MG/DL (ref 70–110)
POCT GLUCOSE: 99 MG/DL (ref 70–110)
POCT GLUCOSE: >500 MG/DL (ref 70–110)
POTASSIUM BLD-SCNC: 4.2 MMOL/L (ref 3.5–5.1)
POTASSIUM SERPL-SCNC: 3.2 MMOL/L (ref 3.5–5.1)
POTASSIUM SERPL-SCNC: 3.5 MMOL/L (ref 3.5–5.1)
POTASSIUM SERPL-SCNC: 3.6 MMOL/L (ref 3.5–5.1)
POTASSIUM SERPL-SCNC: 4 MMOL/L (ref 3.5–5.1)
POTASSIUM SERPL-SCNC: 4.2 MMOL/L (ref 3.5–5.1)
POTASSIUM SERPL-SCNC: 4.2 MMOL/L (ref 3.5–5.1)
POTASSIUM SERPL-SCNC: 4.4 MMOL/L (ref 3.5–5.1)
POTASSIUM SERPL-SCNC: 4.4 MMOL/L (ref 3.5–5.1)
POTASSIUM SERPL-SCNC: 4.5 MMOL/L (ref 3.5–5.1)
POTASSIUM SERPL-SCNC: 4.6 MMOL/L (ref 3.5–5.1)
POTASSIUM SERPL-SCNC: 4.6 MMOL/L (ref 3.5–5.1)
POTASSIUM SERPL-SCNC: 5.9 MMOL/L (ref 3.5–5.1)
PROT SERPL-MCNC: 6.2 G/DL (ref 6–8.4)
PROT SERPL-MCNC: 6.2 G/DL (ref 6–8.4)
PROT SERPL-MCNC: 6.3 G/DL (ref 6–8.4)
PROT SERPL-MCNC: 6.4 G/DL (ref 6–8.4)
PROT SERPL-MCNC: 6.5 G/DL (ref 6–8.4)
PROT SERPL-MCNC: 6.5 G/DL (ref 6–8.4)
PROT SERPL-MCNC: 6.6 G/DL (ref 6–8.4)
PROTHROMBIN TIME: 13.9 SEC (ref 9–12.5)
RA MAJOR: 5.43 CM
RA PRESSURE: 15 MMHG
RA WIDTH: 3.95 CM
RBC # BLD AUTO: 3.05 M/UL (ref 4.6–6.2)
RBC # BLD AUTO: 3.09 M/UL (ref 4.6–6.2)
RBC # BLD AUTO: 3.14 M/UL (ref 4.6–6.2)
RBC # BLD AUTO: 3.17 M/UL (ref 4.6–6.2)
RBC # BLD AUTO: 3.21 M/UL (ref 4.6–6.2)
RBC # BLD AUTO: 3.25 M/UL (ref 4.6–6.2)
RBC # BLD AUTO: 3.36 M/UL (ref 4.6–6.2)
RBC # BLD AUTO: 3.4 M/UL (ref 4.6–6.2)
RBC # BLD AUTO: 3.53 M/UL (ref 4.6–6.2)
RBC # BLD AUTO: 3.67 M/UL (ref 4.6–6.2)
RBC # BLD AUTO: 3.7 M/UL (ref 4.6–6.2)
RIGHT VENTRICULAR END-DIASTOLIC DIMENSION: 4.79 CM
RSV AG BY MOLECULAR METHOD: NOT DETECTED
SAMPLE: ABNORMAL
SAMPLE: ABNORMAL
SARS-COV-2 RNA RESP QL NAA+PROBE: NOT DETECTED
SINUS: 3.5 CM
SITE: ABNORMAL
SODIUM BLD-SCNC: 133 MMOL/L (ref 136–145)
SODIUM SERPL-SCNC: 129 MMOL/L (ref 136–145)
SODIUM SERPL-SCNC: 132 MMOL/L (ref 136–145)
SODIUM SERPL-SCNC: 133 MMOL/L (ref 136–145)
SODIUM SERPL-SCNC: 134 MMOL/L (ref 136–145)
SODIUM SERPL-SCNC: 135 MMOL/L (ref 136–145)
SODIUM SERPL-SCNC: 135 MMOL/L (ref 136–145)
SODIUM SERPL-SCNC: 136 MMOL/L (ref 136–145)
SODIUM SERPL-SCNC: 136 MMOL/L (ref 136–145)
SODIUM SERPL-SCNC: 138 MMOL/L (ref 136–145)
STJ: 2.41 CM
TDI LATERAL: 0.08 M/S
TDI SEPTAL: 0.03 M/S
TDI: 0.06 M/S
TR MAX PG: 52 MMHG
TRICUSPID ANNULAR PLANE SYSTOLIC EXCURSION: 1.47 CM
TROPONIN I SERPL DL<=0.01 NG/ML-MCNC: 0.04 NG/ML (ref 0–0.03)
TV REST PULMONARY ARTERY PRESSURE: 67 MMHG
WBC # BLD AUTO: 3.45 K/UL (ref 3.9–12.7)
WBC # BLD AUTO: 3.66 K/UL (ref 3.9–12.7)
WBC # BLD AUTO: 3.72 K/UL (ref 3.9–12.7)
WBC # BLD AUTO: 3.85 K/UL (ref 3.9–12.7)
WBC # BLD AUTO: 4.72 K/UL (ref 3.9–12.7)
WBC # BLD AUTO: 4.87 K/UL (ref 3.9–12.7)
WBC # BLD AUTO: 5.12 K/UL (ref 3.9–12.7)
WBC # BLD AUTO: 5.17 K/UL (ref 3.9–12.7)
WBC # BLD AUTO: 5.4 K/UL (ref 3.9–12.7)
WBC # BLD AUTO: 5.43 K/UL (ref 3.9–12.7)
WBC # BLD AUTO: 6.25 K/UL (ref 3.9–12.7)

## 2022-01-01 PROCEDURE — 27000221 HC OXYGEN, UP TO 24 HOURS

## 2022-01-01 PROCEDURE — 99232 PR SUBSEQUENT HOSPITAL CARE,LEVL II: ICD-10-PCS | Mod: ,,, | Performed by: NURSE PRACTITIONER

## 2022-01-01 PROCEDURE — 99232 PR SUBSEQUENT HOSPITAL CARE,LEVL II: ICD-10-PCS | Mod: ,,, | Performed by: INTERNAL MEDICINE

## 2022-01-01 PROCEDURE — 90935 PR HEMODIALYSIS, ONE EVALUATION: ICD-10-PCS | Mod: ,,, | Performed by: NURSE PRACTITIONER

## 2022-01-01 PROCEDURE — 99214 OFFICE O/P EST MOD 30 MIN: CPT | Mod: ,,, | Performed by: NURSE PRACTITIONER

## 2022-01-01 PROCEDURE — 0241U SARS-COV2 (COVID) WITH FLU/RSV BY PCR: CPT | Performed by: EMERGENCY MEDICINE

## 2022-01-01 PROCEDURE — 25000003 PHARM REV CODE 250: Performed by: PHYSICIAN ASSISTANT

## 2022-01-01 PROCEDURE — 25000003 PHARM REV CODE 250

## 2022-01-01 PROCEDURE — 99233 SBSQ HOSP IP/OBS HIGH 50: CPT | Mod: ,,,

## 2022-01-01 PROCEDURE — 99232 PR SUBSEQUENT HOSPITAL CARE,LEVL II: ICD-10-PCS | Mod: ,,,

## 2022-01-01 PROCEDURE — 84100 ASSAY OF PHOSPHORUS: CPT | Performed by: EMERGENCY MEDICINE

## 2022-01-01 PROCEDURE — G0378 HOSPITAL OBSERVATION PER HR: HCPCS

## 2022-01-01 PROCEDURE — 99900035 HC TECH TIME PER 15 MIN (STAT)

## 2022-01-01 PROCEDURE — 83880 ASSAY OF NATRIURETIC PEPTIDE: CPT | Performed by: EMERGENCY MEDICINE

## 2022-01-01 PROCEDURE — 90935 HEMODIALYSIS ONE EVALUATION: CPT | Mod: ,,, | Performed by: NURSE PRACTITIONER

## 2022-01-01 PROCEDURE — G0257 UNSCHED DIALYSIS ESRD PT HOS: HCPCS

## 2022-01-01 PROCEDURE — 99233 PR SUBSEQUENT HOSPITAL CARE,LEVL III: ICD-10-PCS | Mod: ,,, | Performed by: NURSE PRACTITIONER

## 2022-01-01 PROCEDURE — 87340 HEPATITIS B SURFACE AG IA: CPT | Performed by: NURSE PRACTITIONER

## 2022-01-01 PROCEDURE — 94640 AIRWAY INHALATION TREATMENT: CPT

## 2022-01-01 PROCEDURE — 94660 CPAP INITIATION&MGMT: CPT

## 2022-01-01 PROCEDURE — 25000003 PHARM REV CODE 250: Performed by: NURSE PRACTITIONER

## 2022-01-01 PROCEDURE — 99232 SBSQ HOSP IP/OBS MODERATE 35: CPT | Mod: ,,, | Performed by: INTERNAL MEDICINE

## 2022-01-01 PROCEDURE — 25000242 PHARM REV CODE 250 ALT 637 W/ HCPCS

## 2022-01-01 PROCEDURE — 93010 ELECTROCARDIOGRAM REPORT: CPT | Mod: ,,, | Performed by: INTERNAL MEDICINE

## 2022-01-01 PROCEDURE — 25000003 PHARM REV CODE 250: Performed by: HOSPITALIST

## 2022-01-01 PROCEDURE — 11000001 HC ACUTE MED/SURG PRIVATE ROOM

## 2022-01-01 PROCEDURE — 25000242 PHARM REV CODE 250 ALT 637 W/ HCPCS: Performed by: HOSPITALIST

## 2022-01-01 PROCEDURE — 99233 PR SUBSEQUENT HOSPITAL CARE,LEVL III: ICD-10-PCS | Mod: ,,,

## 2022-01-01 PROCEDURE — 27000190 HC CPAP FULL FACE MASK W/VALVE

## 2022-01-01 PROCEDURE — 80100016 HC MAINTENANCE HEMODIALYSIS

## 2022-01-01 PROCEDURE — 82010 KETONE BODYS QUAN: CPT | Performed by: EMERGENCY MEDICINE

## 2022-01-01 PROCEDURE — 63600175 PHARM REV CODE 636 W HCPCS

## 2022-01-01 PROCEDURE — 85025 COMPLETE CBC W/AUTO DIFF WBC: CPT

## 2022-01-01 PROCEDURE — 99223 1ST HOSP IP/OBS HIGH 75: CPT | Mod: ,,, | Performed by: INTERNAL MEDICINE

## 2022-01-01 PROCEDURE — 84100 ASSAY OF PHOSPHORUS: CPT

## 2022-01-01 PROCEDURE — 80047 BASIC METABLC PNL IONIZED CA: CPT

## 2022-01-01 PROCEDURE — 83735 ASSAY OF MAGNESIUM: CPT

## 2022-01-01 PROCEDURE — 63600175 PHARM REV CODE 636 W HCPCS: Performed by: PHYSICIAN ASSISTANT

## 2022-01-01 PROCEDURE — 99223 PR INITIAL HOSPITAL CARE,LEVL III: ICD-10-PCS | Mod: ,,, | Performed by: INTERNAL MEDICINE

## 2022-01-01 PROCEDURE — 80053 COMPREHEN METABOLIC PANEL: CPT

## 2022-01-01 PROCEDURE — 94761 N-INVAS EAR/PLS OXIMETRY MLT: CPT

## 2022-01-01 PROCEDURE — 85730 THROMBOPLASTIN TIME PARTIAL: CPT

## 2022-01-01 PROCEDURE — 99214 PR OFFICE/OUTPT VISIT, EST, LEVL IV, 30-39 MIN: ICD-10-PCS | Mod: ,,, | Performed by: NURSE PRACTITIONER

## 2022-01-01 PROCEDURE — 20600001 HC STEP DOWN PRIVATE ROOM

## 2022-01-01 PROCEDURE — 85025 COMPLETE CBC W/AUTO DIFF WBC: CPT | Performed by: EMERGENCY MEDICINE

## 2022-01-01 PROCEDURE — 99239 PR HOSPITAL DISCHARGE DAY,>30 MIN: ICD-10-PCS | Mod: ,,, | Performed by: HOSPITALIST

## 2022-01-01 PROCEDURE — 90935 HEMODIALYSIS ONE EVALUATION: CPT

## 2022-01-01 PROCEDURE — 99232 SBSQ HOSP IP/OBS MODERATE 35: CPT | Mod: ,,, | Performed by: FAMILY MEDICINE

## 2022-01-01 PROCEDURE — 36415 COLL VENOUS BLD VENIPUNCTURE: CPT

## 2022-01-01 PROCEDURE — 84484 ASSAY OF TROPONIN QUANT: CPT | Performed by: EMERGENCY MEDICINE

## 2022-01-01 PROCEDURE — 25000003 PHARM REV CODE 250: Performed by: STUDENT IN AN ORGANIZED HEALTH CARE EDUCATION/TRAINING PROGRAM

## 2022-01-01 PROCEDURE — 80048 BASIC METABOLIC PNL TOTAL CA: CPT

## 2022-01-01 PROCEDURE — 90935 HEMODIALYSIS ONE EVALUATION: CPT | Mod: ,,, | Performed by: INTERNAL MEDICINE

## 2022-01-01 PROCEDURE — 63600175 PHARM REV CODE 636 W HCPCS: Performed by: EMERGENCY MEDICINE

## 2022-01-01 PROCEDURE — 80100014 HC HEMODIALYSIS 1:1

## 2022-01-01 PROCEDURE — 99232 SBSQ HOSP IP/OBS MODERATE 35: CPT | Mod: ,,, | Performed by: NURSE PRACTITIONER

## 2022-01-01 PROCEDURE — 99239 PR HOSPITAL DISCHARGE DAY,>30 MIN: ICD-10-PCS | Mod: ,,,

## 2022-01-01 PROCEDURE — 99226 PR SUBSEQUENT OBSERVATION CARE,LEVEL III: CPT | Mod: ,,,

## 2022-01-01 PROCEDURE — 25000242 PHARM REV CODE 250 ALT 637 W/ HCPCS: Performed by: PHYSICIAN ASSISTANT

## 2022-01-01 PROCEDURE — 93010 EKG 12-LEAD: ICD-10-PCS | Mod: ,,, | Performed by: INTERNAL MEDICINE

## 2022-01-01 PROCEDURE — 80053 COMPREHEN METABOLIC PANEL: CPT | Performed by: EMERGENCY MEDICINE

## 2022-01-01 PROCEDURE — 99220 PR INITIAL OBSERVATION CARE,LEVL III: CPT | Mod: ,,, | Performed by: PHYSICIAN ASSISTANT

## 2022-01-01 PROCEDURE — 93005 ELECTROCARDIOGRAM TRACING: CPT

## 2022-01-01 PROCEDURE — 99232 SBSQ HOSP IP/OBS MODERATE 35: CPT | Mod: ,,,

## 2022-01-01 PROCEDURE — 99285 EMERGENCY DEPT VISIT HI MDM: CPT | Mod: 25

## 2022-01-01 PROCEDURE — 83036 HEMOGLOBIN GLYCOSYLATED A1C: CPT

## 2022-01-01 PROCEDURE — 96372 THER/PROPH/DIAG INJ SC/IM: CPT | Performed by: PHYSICIAN ASSISTANT

## 2022-01-01 PROCEDURE — 87389 HIV-1 AG W/HIV-1&-2 AB AG IA: CPT | Performed by: PHYSICIAN ASSISTANT

## 2022-01-01 PROCEDURE — 99233 SBSQ HOSP IP/OBS HIGH 50: CPT | Mod: ,,, | Performed by: NURSE PRACTITIONER

## 2022-01-01 PROCEDURE — 99232 PR SUBSEQUENT HOSPITAL CARE,LEVL II: ICD-10-PCS | Mod: ,,, | Performed by: FAMILY MEDICINE

## 2022-01-01 PROCEDURE — 80069 RENAL FUNCTION PANEL: CPT

## 2022-01-01 PROCEDURE — 82803 BLOOD GASES ANY COMBINATION: CPT

## 2022-01-01 PROCEDURE — 99285 EMERGENCY DEPT VISIT HI MDM: CPT | Mod: CS,,, | Performed by: EMERGENCY MEDICINE

## 2022-01-01 PROCEDURE — 99285 EMERGENCY DEPT VISIT HI MDM: CPT | Mod: ,,, | Performed by: EMERGENCY MEDICINE

## 2022-01-01 PROCEDURE — 63600175 PHARM REV CODE 636 W HCPCS: Performed by: STUDENT IN AN ORGANIZED HEALTH CARE EDUCATION/TRAINING PROGRAM

## 2022-01-01 PROCEDURE — 85610 PROTHROMBIN TIME: CPT

## 2022-01-01 PROCEDURE — 99239 HOSP IP/OBS DSCHRG MGMT >30: CPT | Mod: ,,,

## 2022-01-01 PROCEDURE — 99285 PR EMERGENCY DEPT VISIT,LEVEL V: ICD-10-PCS | Mod: CS,,, | Performed by: EMERGENCY MEDICINE

## 2022-01-01 PROCEDURE — 99239 HOSP IP/OBS DSCHRG MGMT >30: CPT | Mod: ,,, | Performed by: HOSPITALIST

## 2022-01-01 PROCEDURE — 99220 PR INITIAL OBSERVATION CARE,LEVL III: ICD-10-PCS | Mod: ,,,

## 2022-01-01 PROCEDURE — 99220 PR INITIAL OBSERVATION CARE,LEVL III: CPT | Mod: ,,,

## 2022-01-01 PROCEDURE — 96372 THER/PROPH/DIAG INJ SC/IM: CPT

## 2022-01-01 PROCEDURE — 82962 GLUCOSE BLOOD TEST: CPT

## 2022-01-01 PROCEDURE — 96372 THER/PROPH/DIAG INJ SC/IM: CPT | Performed by: STUDENT IN AN ORGANIZED HEALTH CARE EDUCATION/TRAINING PROGRAM

## 2022-01-01 PROCEDURE — 99226 PR SUBSEQUENT OBSERVATION CARE,LEVEL III: ICD-10-PCS | Mod: ,,,

## 2022-01-01 PROCEDURE — 83880 ASSAY OF NATRIURETIC PEPTIDE: CPT

## 2022-01-01 PROCEDURE — 90935 PR HEMODIALYSIS, ONE EVALUATION: ICD-10-PCS | Mod: ,,, | Performed by: INTERNAL MEDICINE

## 2022-01-01 PROCEDURE — 99220 PR INITIAL OBSERVATION CARE,LEVL III: ICD-10-PCS | Mod: ,,, | Performed by: PHYSICIAN ASSISTANT

## 2022-01-01 PROCEDURE — 99285 PR EMERGENCY DEPT VISIT,LEVEL V: ICD-10-PCS | Mod: ,,, | Performed by: EMERGENCY MEDICINE

## 2022-01-01 RX ORDER — DIPHENHYDRAMINE HCL 25 MG
25 CAPSULE ORAL ONCE
Status: DISCONTINUED | OUTPATIENT
Start: 2022-01-01 | End: 2022-01-01

## 2022-01-01 RX ORDER — HYDRALAZINE HYDROCHLORIDE 20 MG/ML
10 INJECTION INTRAMUSCULAR; INTRAVENOUS ONCE
Status: COMPLETED | OUTPATIENT
Start: 2022-01-01 | End: 2022-01-01

## 2022-01-01 RX ORDER — CARVEDILOL 3.12 MG/1
6.25 TABLET ORAL 2 TIMES DAILY
Qty: 120 TABLET | Refills: 11
Start: 2022-01-01 | End: 2023-01-01 | Stop reason: DRUGHIGH

## 2022-01-01 RX ORDER — GLUCAGON 1 MG
1 KIT INJECTION
Status: DISCONTINUED | OUTPATIENT
Start: 2022-01-01 | End: 2022-01-01

## 2022-01-01 RX ORDER — CALCIUM ACETATE 667 MG/1
667 CAPSULE ORAL
Status: DISCONTINUED | OUTPATIENT
Start: 2022-01-01 | End: 2022-01-01

## 2022-01-01 RX ORDER — INSULIN ASPART 100 [IU]/ML
6 INJECTION, SOLUTION INTRAVENOUS; SUBCUTANEOUS
Status: DISCONTINUED | OUTPATIENT
Start: 2022-01-01 | End: 2022-01-01

## 2022-01-01 RX ORDER — MUPIROCIN 20 MG/G
OINTMENT TOPICAL 2 TIMES DAILY
Status: DISPENSED | OUTPATIENT
Start: 2022-01-01 | End: 2022-01-01

## 2022-01-01 RX ORDER — DIVALPROEX SODIUM 125 MG/1
125 CAPSULE, COATED PELLETS ORAL ONCE
Status: COMPLETED | OUTPATIENT
Start: 2022-01-01 | End: 2022-01-01

## 2022-01-01 RX ORDER — POLYETHYLENE GLYCOL 3350 17 G/17G
17 POWDER, FOR SOLUTION ORAL 2 TIMES DAILY PRN
Status: DISCONTINUED | OUTPATIENT
Start: 2022-01-01 | End: 2022-01-01 | Stop reason: HOSPADM

## 2022-01-01 RX ORDER — INSULIN ASPART 100 [IU]/ML
5 INJECTION, SOLUTION INTRAVENOUS; SUBCUTANEOUS
Status: DISCONTINUED | OUTPATIENT
Start: 2022-01-01 | End: 2022-01-01

## 2022-01-01 RX ORDER — HYDRALAZINE HYDROCHLORIDE 50 MG/1
100 TABLET, FILM COATED ORAL EVERY 8 HOURS
Status: DISCONTINUED | OUTPATIENT
Start: 2022-01-01 | End: 2022-01-01 | Stop reason: HOSPADM

## 2022-01-01 RX ORDER — ISOSORBIDE MONONITRATE 30 MG/1
30 TABLET, EXTENDED RELEASE ORAL 2 TIMES DAILY
Status: DISCONTINUED | OUTPATIENT
Start: 2022-01-01 | End: 2022-01-01 | Stop reason: HOSPADM

## 2022-01-01 RX ORDER — FLUTICASONE FUROATE AND VILANTEROL 100; 25 UG/1; UG/1
1 POWDER RESPIRATORY (INHALATION) DAILY
Status: DISCONTINUED | OUTPATIENT
Start: 2022-01-01 | End: 2022-01-01 | Stop reason: HOSPADM

## 2022-01-01 RX ORDER — FLUOXETINE HYDROCHLORIDE 20 MG/1
40 CAPSULE ORAL DAILY
Status: DISCONTINUED | OUTPATIENT
Start: 2022-01-01 | End: 2022-01-01 | Stop reason: HOSPADM

## 2022-01-01 RX ORDER — ALBUTEROL SULFATE 90 UG/1
2 AEROSOL, METERED RESPIRATORY (INHALATION) EVERY 6 HOURS PRN
Qty: 18 G | Refills: 0 | Status: CANCELLED
Start: 2022-01-01

## 2022-01-01 RX ORDER — ONDANSETRON 8 MG/1
8 TABLET, ORALLY DISINTEGRATING ORAL EVERY 8 HOURS PRN
Status: DISCONTINUED | OUTPATIENT
Start: 2022-01-01 | End: 2022-01-01

## 2022-01-01 RX ORDER — SODIUM CHLORIDE 0.9 % (FLUSH) 0.9 %
10 SYRINGE (ML) INJECTION
Status: DISCONTINUED | OUTPATIENT
Start: 2022-01-01 | End: 2022-01-01 | Stop reason: HOSPADM

## 2022-01-01 RX ORDER — GLUCAGON 1 MG
1 KIT INJECTION
Status: DISCONTINUED | OUTPATIENT
Start: 2022-01-01 | End: 2022-01-01 | Stop reason: HOSPADM

## 2022-01-01 RX ORDER — ASPIRIN 81 MG/1
81 TABLET ORAL DAILY
Status: DISCONTINUED | OUTPATIENT
Start: 2022-01-01 | End: 2022-01-01 | Stop reason: HOSPADM

## 2022-01-01 RX ORDER — SODIUM CHLORIDE 9 MG/ML
INJECTION, SOLUTION INTRAVENOUS ONCE
Status: CANCELLED | OUTPATIENT
Start: 2022-01-01 | End: 2022-01-01

## 2022-01-01 RX ORDER — CLONIDINE 0.3 MG/24H
1 PATCH, EXTENDED RELEASE TRANSDERMAL
Status: DISCONTINUED | OUTPATIENT
Start: 2022-01-01 | End: 2022-01-01 | Stop reason: HOSPADM

## 2022-01-01 RX ORDER — MAG HYDROX/ALUMINUM HYD/SIMETH 200-200-20
30 SUSPENSION, ORAL (FINAL DOSE FORM) ORAL 4 TIMES DAILY PRN
Status: DISCONTINUED | OUTPATIENT
Start: 2022-01-01 | End: 2022-01-01 | Stop reason: HOSPADM

## 2022-01-01 RX ORDER — INSULIN ASPART 100 [IU]/ML
4 INJECTION, SOLUTION INTRAVENOUS; SUBCUTANEOUS
Status: DISCONTINUED | OUTPATIENT
Start: 2022-01-01 | End: 2022-01-01

## 2022-01-01 RX ORDER — SODIUM CHLORIDE 9 MG/ML
INJECTION, SOLUTION INTRAVENOUS ONCE
Status: DISCONTINUED | OUTPATIENT
Start: 2022-01-01 | End: 2022-01-01

## 2022-01-01 RX ORDER — IBUPROFEN 200 MG
16 TABLET ORAL
Status: DISCONTINUED | OUTPATIENT
Start: 2022-01-01 | End: 2022-01-01 | Stop reason: HOSPADM

## 2022-01-01 RX ORDER — FUROSEMIDE 10 MG/ML
80 INJECTION INTRAMUSCULAR; INTRAVENOUS ONCE
Status: COMPLETED | OUTPATIENT
Start: 2022-01-01 | End: 2022-01-01

## 2022-01-01 RX ORDER — FLUOXETINE HYDROCHLORIDE 20 MG/1
40 CAPSULE ORAL DAILY
Status: DISCONTINUED | OUTPATIENT
Start: 2022-01-01 | End: 2022-01-01

## 2022-01-01 RX ORDER — HYDRALAZINE HYDROCHLORIDE 100 MG/1
100 TABLET, FILM COATED ORAL EVERY 8 HOURS
Status: ON HOLD
Start: 2022-01-01 | End: 2023-01-01 | Stop reason: HOSPADM

## 2022-01-01 RX ORDER — NALOXONE HCL 0.4 MG/ML
0.02 VIAL (ML) INJECTION
Status: DISCONTINUED | OUTPATIENT
Start: 2022-01-01 | End: 2022-01-01 | Stop reason: HOSPADM

## 2022-01-01 RX ORDER — ATORVASTATIN CALCIUM 40 MG/1
40 TABLET, FILM COATED ORAL DAILY
Status: DISCONTINUED | OUTPATIENT
Start: 2022-01-01 | End: 2022-01-01 | Stop reason: HOSPADM

## 2022-01-01 RX ORDER — OXYCODONE AND ACETAMINOPHEN 5; 325 MG/1; MG/1
1 TABLET ORAL EVERY 6 HOURS PRN
Qty: 15 TABLET | Refills: 0
Start: 2022-01-01

## 2022-01-01 RX ORDER — IBUPROFEN 200 MG
24 TABLET ORAL
Status: DISCONTINUED | OUTPATIENT
Start: 2022-01-01 | End: 2022-01-01

## 2022-01-01 RX ORDER — IBUPROFEN 200 MG
24 TABLET ORAL
Status: DISCONTINUED | OUTPATIENT
Start: 2022-01-01 | End: 2022-01-01 | Stop reason: HOSPADM

## 2022-01-01 RX ORDER — INSULIN ASPART 100 [IU]/ML
0-5 INJECTION, SOLUTION INTRAVENOUS; SUBCUTANEOUS
Status: DISCONTINUED | OUTPATIENT
Start: 2022-01-01 | End: 2022-01-01 | Stop reason: HOSPADM

## 2022-01-01 RX ORDER — HYDRALAZINE HYDROCHLORIDE 20 MG/ML
10 INJECTION INTRAMUSCULAR; INTRAVENOUS EVERY 4 HOURS PRN
Status: DISCONTINUED | OUTPATIENT
Start: 2022-01-01 | End: 2022-01-01 | Stop reason: HOSPADM

## 2022-01-01 RX ORDER — INSULIN ASPART 100 [IU]/ML
3 INJECTION, SOLUTION INTRAVENOUS; SUBCUTANEOUS
Status: DISCONTINUED | OUTPATIENT
Start: 2022-01-01 | End: 2022-01-01

## 2022-01-01 RX ORDER — ONDANSETRON 8 MG/1
8 TABLET, ORALLY DISINTEGRATING ORAL EVERY 8 HOURS PRN
Status: DISCONTINUED | OUTPATIENT
Start: 2022-01-01 | End: 2022-01-01 | Stop reason: HOSPADM

## 2022-01-01 RX ORDER — CARVEDILOL 6.25 MG/1
6.25 TABLET ORAL 2 TIMES DAILY
Status: DISCONTINUED | OUTPATIENT
Start: 2022-01-01 | End: 2022-01-01 | Stop reason: HOSPADM

## 2022-01-01 RX ORDER — PROCHLORPERAZINE EDISYLATE 5 MG/ML
5 INJECTION INTRAMUSCULAR; INTRAVENOUS EVERY 6 HOURS PRN
Status: DISCONTINUED | OUTPATIENT
Start: 2022-01-01 | End: 2022-01-01

## 2022-01-01 RX ORDER — INSULIN ASPART 100 [IU]/ML
7 INJECTION, SOLUTION INTRAVENOUS; SUBCUTANEOUS
Status: DISCONTINUED | OUTPATIENT
Start: 2022-01-01 | End: 2022-01-01

## 2022-01-01 RX ORDER — CARVEDILOL 3.12 MG/1
3.12 TABLET ORAL 2 TIMES DAILY
Status: DISCONTINUED | OUTPATIENT
Start: 2022-01-01 | End: 2022-01-01

## 2022-01-01 RX ORDER — LIDOCAINE HYDROCHLORIDE 20 MG/ML
JELLY TOPICAL
Status: DISCONTINUED | OUTPATIENT
Start: 2022-01-01 | End: 2022-01-01 | Stop reason: HOSPADM

## 2022-01-01 RX ORDER — NIFEDIPINE 90 MG/1
90 TABLET, EXTENDED RELEASE ORAL DAILY
Qty: 30 TABLET | Refills: 11 | Status: ON HOLD | OUTPATIENT
Start: 2022-01-01 | End: 2023-01-01 | Stop reason: HOSPADM

## 2022-01-01 RX ORDER — FLUOXETINE HYDROCHLORIDE 40 MG/1
40 CAPSULE ORAL DAILY
Status: CANCELLED
Start: 2022-01-01

## 2022-01-01 RX ORDER — SODIUM CHLORIDE 0.9 % (FLUSH) 0.9 %
10 SYRINGE (ML) INJECTION EVERY 8 HOURS PRN
Status: DISCONTINUED | OUTPATIENT
Start: 2022-01-01 | End: 2022-01-01 | Stop reason: HOSPADM

## 2022-01-01 RX ORDER — ISOSORBIDE MONONITRATE 30 MG/1
30 TABLET, EXTENDED RELEASE ORAL 2 TIMES DAILY
Status: DISCONTINUED | OUTPATIENT
Start: 2022-01-01 | End: 2022-01-01

## 2022-01-01 RX ORDER — PEN NEEDLE, DIABETIC 30 GX3/16"
1 NEEDLE, DISPOSABLE MISCELLANEOUS
Qty: 100 EACH | Refills: 0
Start: 2022-01-01

## 2022-01-01 RX ORDER — ACETAMINOPHEN 325 MG/1
650 TABLET ORAL EVERY 6 HOURS PRN
Status: DISCONTINUED | OUTPATIENT
Start: 2022-01-01 | End: 2022-01-01 | Stop reason: HOSPADM

## 2022-01-01 RX ORDER — CETIRIZINE HYDROCHLORIDE 10 MG/1
10 TABLET ORAL DAILY PRN
Status: DISCONTINUED | OUTPATIENT
Start: 2022-01-01 | End: 2022-01-01 | Stop reason: HOSPADM

## 2022-01-01 RX ORDER — DEXTROMETHORPHAN HYDROBROMIDE, GUAIFENESIN 5; 100 MG/5ML; MG/5ML
650 LIQUID ORAL EVERY 8 HOURS PRN
Refills: 0 | Status: CANCELLED
Start: 2022-01-01

## 2022-01-01 RX ORDER — SODIUM CHLORIDE 9 MG/ML
INJECTION, SOLUTION INTRAVENOUS ONCE
Status: COMPLETED | OUTPATIENT
Start: 2022-01-01 | End: 2022-01-01

## 2022-01-01 RX ORDER — IPRATROPIUM BROMIDE AND ALBUTEROL SULFATE 2.5; .5 MG/3ML; MG/3ML
3 SOLUTION RESPIRATORY (INHALATION) EVERY 6 HOURS PRN
Status: DISCONTINUED | OUTPATIENT
Start: 2022-01-01 | End: 2022-01-01 | Stop reason: HOSPADM

## 2022-01-01 RX ORDER — INSULIN ASPART 100 [IU]/ML
6 INJECTION, SOLUTION INTRAVENOUS; SUBCUTANEOUS
Status: DISCONTINUED | OUTPATIENT
Start: 2022-01-01 | End: 2022-01-01 | Stop reason: HOSPADM

## 2022-01-01 RX ORDER — ATORVASTATIN CALCIUM 40 MG/1
40 TABLET, FILM COATED ORAL DAILY
Qty: 90 TABLET | Refills: 3 | Status: CANCELLED
Start: 2022-01-01 | End: 2023-11-21

## 2022-01-01 RX ORDER — HYDRALAZINE HYDROCHLORIDE 50 MG/1
100 TABLET, FILM COATED ORAL 3 TIMES DAILY
Status: DISCONTINUED | OUTPATIENT
Start: 2022-01-01 | End: 2022-01-01 | Stop reason: HOSPADM

## 2022-01-01 RX ORDER — CARVEDILOL 3.12 MG/1
3.12 TABLET ORAL 2 TIMES DAILY
Qty: 60 TABLET | Refills: 11
Start: 2022-01-01 | End: 2023-11-21

## 2022-01-01 RX ORDER — CARVEDILOL 3.12 MG/1
3.12 TABLET ORAL ONCE
Status: COMPLETED | OUTPATIENT
Start: 2022-01-01 | End: 2022-01-01

## 2022-01-01 RX ORDER — HEPARIN SODIUM 1000 [USP'U]/ML
1000 INJECTION, SOLUTION INTRAVENOUS; SUBCUTANEOUS
Status: CANCELLED | OUTPATIENT
Start: 2022-01-01 | End: 2022-01-01

## 2022-01-01 RX ORDER — IPRATROPIUM BROMIDE AND ALBUTEROL SULFATE 2.5; .5 MG/3ML; MG/3ML
3 SOLUTION RESPIRATORY (INHALATION) EVERY 4 HOURS PRN
Status: DISCONTINUED | OUTPATIENT
Start: 2022-01-01 | End: 2022-01-01 | Stop reason: HOSPADM

## 2022-01-01 RX ORDER — FUROSEMIDE 10 MG/ML
100 INJECTION INTRAMUSCULAR; INTRAVENOUS ONCE
Status: COMPLETED | OUTPATIENT
Start: 2022-01-01 | End: 2022-01-01

## 2022-01-01 RX ORDER — SODIUM CHLORIDE 0.9 % (FLUSH) 0.9 %
10 SYRINGE (ML) INJECTION EVERY 12 HOURS PRN
Status: DISCONTINUED | OUTPATIENT
Start: 2022-01-01 | End: 2022-01-01 | Stop reason: HOSPADM

## 2022-01-01 RX ORDER — NIFEDIPINE 30 MG/1
60 TABLET, EXTENDED RELEASE ORAL DAILY
Status: DISCONTINUED | OUTPATIENT
Start: 2022-01-01 | End: 2022-01-01

## 2022-01-01 RX ORDER — ACETAMINOPHEN 325 MG/1
650 TABLET ORAL EVERY 4 HOURS PRN
Status: DISCONTINUED | OUTPATIENT
Start: 2022-01-01 | End: 2022-01-01 | Stop reason: HOSPADM

## 2022-01-01 RX ORDER — HEPARIN SODIUM 5000 [USP'U]/ML
5000 INJECTION, SOLUTION INTRAVENOUS; SUBCUTANEOUS EVERY 8 HOURS
Status: DISCONTINUED | OUTPATIENT
Start: 2022-01-01 | End: 2022-01-01

## 2022-01-01 RX ORDER — IBUPROFEN 200 MG
16 TABLET ORAL
Status: DISCONTINUED | OUTPATIENT
Start: 2022-01-01 | End: 2022-01-01

## 2022-01-01 RX ORDER — HYDRALAZINE HYDROCHLORIDE 100 MG/1
100 TABLET, FILM COATED ORAL 3 TIMES DAILY PRN
Start: 2022-01-01

## 2022-01-01 RX ORDER — ACETAMINOPHEN 325 MG/1
650 TABLET ORAL EVERY 8 HOURS PRN
Status: DISCONTINUED | OUTPATIENT
Start: 2022-01-01 | End: 2022-01-01 | Stop reason: HOSPADM

## 2022-01-01 RX ORDER — MUPIROCIN 20 MG/G
OINTMENT TOPICAL 2 TIMES DAILY
Status: DISCONTINUED | OUTPATIENT
Start: 2022-01-01 | End: 2022-01-01 | Stop reason: HOSPADM

## 2022-01-01 RX ORDER — INSULIN ASPART 100 [IU]/ML
5 INJECTION, SOLUTION INTRAVENOUS; SUBCUTANEOUS
Qty: 12 ML | Refills: 3 | Status: ON HOLD | OUTPATIENT
Start: 2022-01-01 | End: 2023-01-01 | Stop reason: SDUPTHER

## 2022-01-01 RX ORDER — ASPIRIN 81 MG/1
81 TABLET ORAL DAILY
Refills: 0 | Status: CANCELLED
Start: 2022-01-01

## 2022-01-01 RX ORDER — TRIAMCINOLONE ACETONIDE 0.25 MG/ML
1 LOTION TOPICAL 2 TIMES DAILY
Refills: 0
Start: 2022-01-01

## 2022-01-01 RX ORDER — DIPHENHYDRAMINE HCL 25 MG
25 CAPSULE ORAL EVERY 6 HOURS PRN
Status: DISCONTINUED | OUTPATIENT
Start: 2022-01-01 | End: 2022-01-01 | Stop reason: HOSPADM

## 2022-01-01 RX ORDER — POLYETHYLENE GLYCOL 3350 17 G/17G
17 POWDER, FOR SOLUTION ORAL DAILY
Status: DISCONTINUED | OUTPATIENT
Start: 2022-01-01 | End: 2022-01-01 | Stop reason: HOSPADM

## 2022-01-01 RX ORDER — SENNOSIDES 8.6 MG/1
8.6 TABLET ORAL 2 TIMES DAILY
Status: DISCONTINUED | OUTPATIENT
Start: 2022-01-01 | End: 2022-01-01 | Stop reason: HOSPADM

## 2022-01-01 RX ORDER — TALC
9 POWDER (GRAM) TOPICAL NIGHTLY PRN
Status: DISCONTINUED | OUTPATIENT
Start: 2022-01-01 | End: 2022-01-01 | Stop reason: HOSPADM

## 2022-01-01 RX ORDER — SODIUM CHLORIDE 9 MG/ML
INJECTION, SOLUTION INTRAVENOUS
Status: CANCELLED | OUTPATIENT
Start: 2022-01-01

## 2022-01-01 RX ORDER — TALC
6 POWDER (GRAM) TOPICAL NIGHTLY PRN
Status: DISCONTINUED | OUTPATIENT
Start: 2022-01-01 | End: 2022-01-01 | Stop reason: HOSPADM

## 2022-01-01 RX ORDER — INSULIN ASPART 100 [IU]/ML
10 INJECTION, SOLUTION INTRAVENOUS; SUBCUTANEOUS
Status: DISCONTINUED | OUTPATIENT
Start: 2022-01-01 | End: 2022-01-01

## 2022-01-01 RX ORDER — CALCIUM ACETATE 667 MG/1
667 CAPSULE ORAL 3 TIMES DAILY
Start: 2022-01-01

## 2022-01-01 RX ORDER — NIFEDIPINE 30 MG/1
90 TABLET, EXTENDED RELEASE ORAL DAILY
Status: DISCONTINUED | OUTPATIENT
Start: 2022-01-01 | End: 2022-01-01 | Stop reason: HOSPADM

## 2022-01-01 RX ORDER — NIFEDIPINE 30 MG/1
30 TABLET, EXTENDED RELEASE ORAL ONCE
Status: COMPLETED | OUTPATIENT
Start: 2022-01-01 | End: 2022-01-01

## 2022-01-01 RX ORDER — HYDRALAZINE HYDROCHLORIDE 20 MG/ML
10 INJECTION INTRAMUSCULAR; INTRAVENOUS ONCE
Status: DISCONTINUED | OUTPATIENT
Start: 2022-01-01 | End: 2022-01-01

## 2022-01-01 RX ADMIN — FLUTICASONE FUROATE AND VILANTEROL TRIFENATATE 1 PUFF: 100; 25 POWDER RESPIRATORY (INHALATION) at 11:11

## 2022-01-01 RX ADMIN — FLUTICASONE FUROATE AND VILANTEROL TRIFENATATE 1 PUFF: 100; 25 POWDER RESPIRATORY (INHALATION) at 12:12

## 2022-01-01 RX ADMIN — CALCIUM ACETATE 667 MG: 667 CAPSULE ORAL at 07:11

## 2022-01-01 RX ADMIN — HYDRALAZINE HYDROCHLORIDE 10 MG: 20 INJECTION, SOLUTION INTRAMUSCULAR; INTRAVENOUS at 05:12

## 2022-01-01 RX ADMIN — CARVEDILOL 6.25 MG: 6.25 TABLET, FILM COATED ORAL at 09:12

## 2022-01-01 RX ADMIN — APIXABAN 5 MG: 5 TABLET, FILM COATED ORAL at 11:11

## 2022-01-01 RX ADMIN — ISOSORBIDE MONONITRATE 30 MG: 30 TABLET, EXTENDED RELEASE ORAL at 09:11

## 2022-01-01 RX ADMIN — ATORVASTATIN CALCIUM 40 MG: 40 TABLET, FILM COATED ORAL at 08:11

## 2022-01-01 RX ADMIN — IPRATROPIUM BROMIDE AND ALBUTEROL SULFATE 3 ML: 2.5; .5 SOLUTION RESPIRATORY (INHALATION) at 11:12

## 2022-01-01 RX ADMIN — POLYETHYLENE GLYCOL 3350 17 G: 17 POWDER, FOR SOLUTION ORAL at 09:11

## 2022-01-01 RX ADMIN — ASPIRIN 81 MG: 81 TABLET, COATED ORAL at 08:11

## 2022-01-01 RX ADMIN — MUPIROCIN: 20 OINTMENT TOPICAL at 11:11

## 2022-01-01 RX ADMIN — INSULIN HUMAN 8 UNITS: 100 INJECTION, SOLUTION PARENTERAL at 04:12

## 2022-01-01 RX ADMIN — CARVEDILOL 3.12 MG: 3.12 TABLET, FILM COATED ORAL at 08:11

## 2022-01-01 RX ADMIN — NIFEDIPINE 90 MG: 60 TABLET, FILM COATED, EXTENDED RELEASE ORAL at 12:12

## 2022-01-01 RX ADMIN — INSULIN ASPART 6 UNITS: 100 INJECTION, SOLUTION INTRAVENOUS; SUBCUTANEOUS at 05:11

## 2022-01-01 RX ADMIN — INSULIN DETEMIR 5 UNITS: 100 INJECTION, SOLUTION SUBCUTANEOUS at 10:11

## 2022-01-01 RX ADMIN — HEPARIN SODIUM 5000 UNITS: 5000 INJECTION INTRAVENOUS; SUBCUTANEOUS at 10:11

## 2022-01-01 RX ADMIN — FLUTICASONE FUROATE AND VILANTEROL TRIFENATATE 1 PUFF: 100; 25 POWDER RESPIRATORY (INHALATION) at 08:11

## 2022-01-01 RX ADMIN — INSULIN ASPART 5 UNITS: 100 INJECTION, SOLUTION INTRAVENOUS; SUBCUTANEOUS at 05:12

## 2022-01-01 RX ADMIN — SODIUM CHLORIDE: 0.9 INJECTION, SOLUTION INTRAVENOUS at 08:11

## 2022-01-01 RX ADMIN — HYDRALAZINE HYDROCHLORIDE 100 MG: 50 TABLET ORAL at 05:12

## 2022-01-01 RX ADMIN — FLUOXETINE 40 MG: 20 CAPSULE ORAL at 12:12

## 2022-01-01 RX ADMIN — INSULIN ASPART 6 UNITS: 100 INJECTION, SOLUTION INTRAVENOUS; SUBCUTANEOUS at 06:11

## 2022-01-01 RX ADMIN — ISOSORBIDE MONONITRATE 30 MG: 30 TABLET, EXTENDED RELEASE ORAL at 11:11

## 2022-01-01 RX ADMIN — MUPIROCIN: 20 OINTMENT TOPICAL at 10:12

## 2022-01-01 RX ADMIN — INSULIN DETEMIR 8 UNITS: 100 INJECTION, SOLUTION SUBCUTANEOUS at 09:11

## 2022-01-01 RX ADMIN — SENNOSIDES 8.6 MG: 8.6 TABLET, FILM COATED ORAL at 08:11

## 2022-01-01 RX ADMIN — ISOSORBIDE MONONITRATE 30 MG: 30 TABLET, EXTENDED RELEASE ORAL at 10:12

## 2022-01-01 RX ADMIN — APIXABAN 5 MG: 5 TABLET, FILM COATED ORAL at 09:11

## 2022-01-01 RX ADMIN — HYDRALAZINE HYDROCHLORIDE 100 MG: 50 TABLET ORAL at 02:12

## 2022-01-01 RX ADMIN — INSULIN ASPART 2 UNITS: 100 INJECTION, SOLUTION INTRAVENOUS; SUBCUTANEOUS at 05:11

## 2022-01-01 RX ADMIN — MUPIROCIN: 20 OINTMENT TOPICAL at 09:12

## 2022-01-01 RX ADMIN — FLUOXETINE 40 MG: 20 CAPSULE ORAL at 10:12

## 2022-01-01 RX ADMIN — SENNOSIDES 8.6 MG: 8.6 TABLET, FILM COATED ORAL at 01:11

## 2022-01-01 RX ADMIN — ISOSORBIDE MONONITRATE 30 MG: 30 TABLET, EXTENDED RELEASE ORAL at 10:11

## 2022-01-01 RX ADMIN — APIXABAN 5 MG: 5 TABLET, FILM COATED ORAL at 10:12

## 2022-01-01 RX ADMIN — CARVEDILOL 6.25 MG: 6.25 TABLET, FILM COATED ORAL at 10:11

## 2022-01-01 RX ADMIN — APIXABAN 5 MG: 5 TABLET, FILM COATED ORAL at 08:11

## 2022-01-01 RX ADMIN — CARVEDILOL 6.25 MG: 6.25 TABLET, FILM COATED ORAL at 08:11

## 2022-01-01 RX ADMIN — INSULIN ASPART 3 UNITS: 100 INJECTION, SOLUTION INTRAVENOUS; SUBCUTANEOUS at 12:11

## 2022-01-01 RX ADMIN — TIOTROPIUM BROMIDE INHALATION SPRAY 2 PUFF: 3.12 SPRAY, METERED RESPIRATORY (INHALATION) at 11:11

## 2022-01-01 RX ADMIN — ASPIRIN 81 MG: 81 TABLET, COATED ORAL at 10:12

## 2022-01-01 RX ADMIN — NEPHROCAP 1 CAPSULE: 1 CAP ORAL at 10:12

## 2022-01-01 RX ADMIN — APIXABAN 5 MG: 5 TABLET, FILM COATED ORAL at 12:12

## 2022-01-01 RX ADMIN — ASPIRIN 81 MG: 81 TABLET, COATED ORAL at 12:11

## 2022-01-01 RX ADMIN — CALCIUM ACETATE 667 MG: 667 CAPSULE ORAL at 08:11

## 2022-01-01 RX ADMIN — APIXABAN 5 MG: 5 TABLET, FILM COATED ORAL at 10:11

## 2022-01-01 RX ADMIN — INSULIN ASPART 3 UNITS: 100 INJECTION, SOLUTION INTRAVENOUS; SUBCUTANEOUS at 08:11

## 2022-01-01 RX ADMIN — CALCIUM ACETATE 667 MG: 667 CAPSULE ORAL at 09:11

## 2022-01-01 RX ADMIN — ISOSORBIDE MONONITRATE 30 MG: 30 TABLET, EXTENDED RELEASE ORAL at 06:12

## 2022-01-01 RX ADMIN — HYDRALAZINE HYDROCHLORIDE 100 MG: 50 TABLET ORAL at 09:12

## 2022-01-01 RX ADMIN — SODIUM CHLORIDE: 9 INJECTION, SOLUTION INTRAVENOUS at 04:12

## 2022-01-01 RX ADMIN — INSULIN ASPART 3 UNITS: 100 INJECTION, SOLUTION INTRAVENOUS; SUBCUTANEOUS at 10:12

## 2022-01-01 RX ADMIN — MUPIROCIN: 20 OINTMENT TOPICAL at 09:11

## 2022-01-01 RX ADMIN — NIFEDIPINE 90 MG: 60 TABLET, FILM COATED, EXTENDED RELEASE ORAL at 09:12

## 2022-01-01 RX ADMIN — CALCIUM ACETATE 667 MG: 667 CAPSULE ORAL at 05:11

## 2022-01-01 RX ADMIN — INSULIN ASPART 6 UNITS: 100 INJECTION, SOLUTION INTRAVENOUS; SUBCUTANEOUS at 12:11

## 2022-01-01 RX ADMIN — HYDRALAZINE HYDROCHLORIDE 100 MG: 50 TABLET ORAL at 09:11

## 2022-01-01 RX ADMIN — INSULIN ASPART 3 UNITS: 100 INJECTION, SOLUTION INTRAVENOUS; SUBCUTANEOUS at 09:11

## 2022-01-01 RX ADMIN — CARVEDILOL 6.25 MG: 6.25 TABLET, FILM COATED ORAL at 09:11

## 2022-01-01 RX ADMIN — NIFEDIPINE 90 MG: 30 TABLET, FILM COATED, EXTENDED RELEASE ORAL at 08:11

## 2022-01-01 RX ADMIN — TIOTROPIUM BROMIDE INHALATION SPRAY 2 PUFF: 3.12 SPRAY, METERED RESPIRATORY (INHALATION) at 08:11

## 2022-01-01 RX ADMIN — INSULIN ASPART 3 UNITS: 100 INJECTION, SOLUTION INTRAVENOUS; SUBCUTANEOUS at 05:11

## 2022-01-01 RX ADMIN — ISOSORBIDE MONONITRATE 30 MG: 30 TABLET, EXTENDED RELEASE ORAL at 12:12

## 2022-01-01 RX ADMIN — HEPARIN SODIUM 5000 UNITS: 5000 INJECTION INTRAVENOUS; SUBCUTANEOUS at 06:11

## 2022-01-01 RX ADMIN — FLUTICASONE FUROATE AND VILANTEROL TRIFENATATE 1 PUFF: 100; 25 POWDER RESPIRATORY (INHALATION) at 07:11

## 2022-01-01 RX ADMIN — SENNOSIDES 8.6 MG: 8.6 TABLET, FILM COATED ORAL at 12:11

## 2022-01-01 RX ADMIN — SENNOSIDES 8.6 MG: 8.6 TABLET, FILM COATED ORAL at 09:11

## 2022-01-01 RX ADMIN — NEPHROCAP 1 CAPSULE: 1 CAP ORAL at 12:12

## 2022-01-01 RX ADMIN — ACETAMINOPHEN 650 MG: 325 TABLET ORAL at 04:11

## 2022-01-01 RX ADMIN — FLUTICASONE FUROATE AND VILANTEROL TRIFENATATE 1 PUFF: 100; 25 POWDER RESPIRATORY (INHALATION) at 09:11

## 2022-01-01 RX ADMIN — CARVEDILOL 3.12 MG: 3.12 TABLET, FILM COATED ORAL at 01:11

## 2022-01-01 RX ADMIN — HYDRALAZINE HYDROCHLORIDE 100 MG: 50 TABLET ORAL at 10:11

## 2022-01-01 RX ADMIN — Medication 24 G: at 10:11

## 2022-01-01 RX ADMIN — HYDRALAZINE HYDROCHLORIDE 100 MG: 50 TABLET ORAL at 04:11

## 2022-01-01 RX ADMIN — CARVEDILOL 3.12 MG: 3.12 TABLET, FILM COATED ORAL at 10:11

## 2022-01-01 RX ADMIN — ISOSORBIDE MONONITRATE 30 MG: 30 TABLET, EXTENDED RELEASE ORAL at 12:11

## 2022-01-01 RX ADMIN — HEPARIN SODIUM 5000 UNITS: 5000 INJECTION INTRAVENOUS; SUBCUTANEOUS at 02:11

## 2022-01-01 RX ADMIN — POLYETHYLENE GLYCOL 3350 17 G: 17 POWDER, FOR SOLUTION ORAL at 08:11

## 2022-01-01 RX ADMIN — INSULIN DETEMIR 12 UNITS: 100 INJECTION, SOLUTION SUBCUTANEOUS at 08:11

## 2022-01-01 RX ADMIN — ATORVASTATIN CALCIUM 40 MG: 40 TABLET, FILM COATED ORAL at 12:12

## 2022-01-01 RX ADMIN — MUPIROCIN: 20 OINTMENT TOPICAL at 01:11

## 2022-01-01 RX ADMIN — ASPIRIN 81 MG: 81 TABLET, COATED ORAL at 09:12

## 2022-01-01 RX ADMIN — HYDRALAZINE HYDROCHLORIDE 100 MG: 50 TABLET ORAL at 08:11

## 2022-01-01 RX ADMIN — NIFEDIPINE 60 MG: 30 TABLET, FILM COATED, EXTENDED RELEASE ORAL at 08:11

## 2022-01-01 RX ADMIN — INSULIN ASPART 3 UNITS: 100 INJECTION, SOLUTION INTRAVENOUS; SUBCUTANEOUS at 12:12

## 2022-01-01 RX ADMIN — INSULIN ASPART 7 UNITS: 100 INJECTION, SOLUTION INTRAVENOUS; SUBCUTANEOUS at 03:12

## 2022-01-01 RX ADMIN — ISOSORBIDE MONONITRATE 30 MG: 30 TABLET, EXTENDED RELEASE ORAL at 08:11

## 2022-01-01 RX ADMIN — INSULIN ASPART 6 UNITS: 100 INJECTION, SOLUTION INTRAVENOUS; SUBCUTANEOUS at 12:12

## 2022-01-01 RX ADMIN — NIFEDIPINE 90 MG: 30 TABLET, FILM COATED, EXTENDED RELEASE ORAL at 09:11

## 2022-01-01 RX ADMIN — FLUTICASONE FUROATE AND VILANTEROL TRIFENATATE 1 PUFF: 100; 25 POWDER RESPIRATORY (INHALATION) at 08:12

## 2022-01-01 RX ADMIN — INSULIN ASPART 5 UNITS: 100 INJECTION, SOLUTION INTRAVENOUS; SUBCUTANEOUS at 11:11

## 2022-01-01 RX ADMIN — INSULIN ASPART 6 UNITS: 100 INJECTION, SOLUTION INTRAVENOUS; SUBCUTANEOUS at 08:11

## 2022-01-01 RX ADMIN — INSULIN ASPART 5 UNITS: 100 INJECTION, SOLUTION INTRAVENOUS; SUBCUTANEOUS at 01:11

## 2022-01-01 RX ADMIN — INSULIN DETEMIR 8 UNITS: 100 INJECTION, SOLUTION SUBCUTANEOUS at 08:11

## 2022-01-01 RX ADMIN — HYDRALAZINE HYDROCHLORIDE 100 MG: 50 TABLET ORAL at 02:11

## 2022-01-01 RX ADMIN — SODIUM CHLORIDE: 0.9 INJECTION, SOLUTION INTRAVENOUS at 09:11

## 2022-01-01 RX ADMIN — CALCIUM ACETATE 667 MG: 667 CAPSULE ORAL at 01:11

## 2022-01-01 RX ADMIN — ATORVASTATIN CALCIUM 40 MG: 40 TABLET, FILM COATED ORAL at 09:12

## 2022-01-01 RX ADMIN — CARVEDILOL 6.25 MG: 6.25 TABLET, FILM COATED ORAL at 12:11

## 2022-01-01 RX ADMIN — HYDRALAZINE HYDROCHLORIDE 100 MG: 50 TABLET ORAL at 06:12

## 2022-01-01 RX ADMIN — INSULIN ASPART 2 UNITS: 100 INJECTION, SOLUTION INTRAVENOUS; SUBCUTANEOUS at 08:11

## 2022-01-01 RX ADMIN — FLUTICASONE FUROATE AND VILANTEROL TRIFENATATE 1 PUFF: 100; 25 POWDER RESPIRATORY (INHALATION) at 11:12

## 2022-01-01 RX ADMIN — HEPARIN SODIUM 5000 UNITS: 5000 INJECTION INTRAVENOUS; SUBCUTANEOUS at 05:11

## 2022-01-01 RX ADMIN — NIFEDIPINE 90 MG: 30 TABLET, FILM COATED, EXTENDED RELEASE ORAL at 11:11

## 2022-01-01 RX ADMIN — HEPARIN SODIUM 5000 UNITS: 5000 INJECTION INTRAVENOUS; SUBCUTANEOUS at 09:11

## 2022-01-01 RX ADMIN — INSULIN ASPART 10 UNITS: 100 INJECTION, SOLUTION INTRAVENOUS; SUBCUTANEOUS at 08:12

## 2022-01-01 RX ADMIN — FLUOXETINE 40 MG: 20 CAPSULE ORAL at 09:12

## 2022-01-01 RX ADMIN — POLYETHYLENE GLYCOL 3350 17 G: 17 POWDER, FOR SOLUTION ORAL at 01:11

## 2022-01-01 RX ADMIN — INSULIN ASPART 2 UNITS: 100 INJECTION, SOLUTION INTRAVENOUS; SUBCUTANEOUS at 12:11

## 2022-01-01 RX ADMIN — SENNOSIDES 8.6 MG: 8.6 TABLET, FILM COATED ORAL at 10:11

## 2022-01-01 RX ADMIN — ASPIRIN 81 MG: 81 TABLET, COATED ORAL at 12:12

## 2022-01-01 RX ADMIN — ACETAMINOPHEN 650 MG: 325 TABLET ORAL at 05:12

## 2022-01-01 RX ADMIN — INSULIN DETEMIR 10 UNITS: 100 INJECTION, SOLUTION SUBCUTANEOUS at 09:12

## 2022-01-01 RX ADMIN — INSULIN DETEMIR 12 UNITS: 100 INJECTION, SOLUTION SUBCUTANEOUS at 11:11

## 2022-01-01 RX ADMIN — FUROSEMIDE 80 MG: 10 INJECTION, SOLUTION INTRAMUSCULAR; INTRAVENOUS at 12:11

## 2022-01-01 RX ADMIN — TIOTROPIUM BROMIDE INHALATION SPRAY 2 PUFF: 3.12 SPRAY, METERED RESPIRATORY (INHALATION) at 07:11

## 2022-01-01 RX ADMIN — CLONIDINE 1 PATCH: 0.3 PATCH TRANSDERMAL at 06:11

## 2022-01-01 RX ADMIN — INSULIN ASPART 5 UNITS: 100 INJECTION, SOLUTION INTRAVENOUS; SUBCUTANEOUS at 09:12

## 2022-01-01 RX ADMIN — FUROSEMIDE 100 MG: 10 INJECTION, SOLUTION INTRAMUSCULAR; INTRAVENOUS at 08:11

## 2022-01-01 RX ADMIN — CALCIUM ACETATE 667 MG: 667 CAPSULE ORAL at 06:11

## 2022-01-01 RX ADMIN — TIOTROPIUM BROMIDE INHALATION SPRAY 2 PUFF: 3.12 SPRAY, METERED RESPIRATORY (INHALATION) at 09:11

## 2022-01-01 RX ADMIN — NIFEDIPINE 60 MG: 30 TABLET, FILM COATED, EXTENDED RELEASE ORAL at 09:11

## 2022-01-01 RX ADMIN — ATORVASTATIN CALCIUM 40 MG: 40 TABLET, FILM COATED ORAL at 01:11

## 2022-01-01 RX ADMIN — INSULIN ASPART 5 UNITS: 100 INJECTION, SOLUTION INTRAVENOUS; SUBCUTANEOUS at 08:11

## 2022-01-01 RX ADMIN — INSULIN ASPART 4 UNITS: 100 INJECTION, SOLUTION INTRAVENOUS; SUBCUTANEOUS at 12:11

## 2022-01-01 RX ADMIN — INSULIN ASPART 1 UNITS: 100 INJECTION, SOLUTION INTRAVENOUS; SUBCUTANEOUS at 11:11

## 2022-01-01 RX ADMIN — HYDRALAZINE HYDROCHLORIDE 100 MG: 50 TABLET ORAL at 11:11

## 2022-01-01 RX ADMIN — INSULIN DETEMIR 15 UNITS: 100 INJECTION, SOLUTION SUBCUTANEOUS at 09:12

## 2022-01-01 RX ADMIN — MUPIROCIN: 20 OINTMENT TOPICAL at 12:12

## 2022-01-01 RX ADMIN — APIXABAN 5 MG: 5 TABLET, FILM COATED ORAL at 09:12

## 2022-01-01 RX ADMIN — INSULIN DETEMIR 5 UNITS: 100 INJECTION, SOLUTION SUBCUTANEOUS at 10:12

## 2022-01-01 RX ADMIN — CARVEDILOL 3.12 MG: 3.12 TABLET, FILM COATED ORAL at 09:11

## 2022-01-01 RX ADMIN — SENNOSIDES 8.6 MG: 8.6 TABLET, FILM COATED ORAL at 11:11

## 2022-01-01 RX ADMIN — APIXABAN 5 MG: 5 TABLET, FILM COATED ORAL at 12:11

## 2022-01-01 RX ADMIN — ASPIRIN 81 MG: 81 TABLET, COATED ORAL at 01:11

## 2022-01-01 RX ADMIN — INSULIN ASPART 4 UNITS: 100 INJECTION, SOLUTION INTRAVENOUS; SUBCUTANEOUS at 05:11

## 2022-01-01 RX ADMIN — CARVEDILOL 6.25 MG: 6.25 TABLET, FILM COATED ORAL at 10:12

## 2022-01-01 RX ADMIN — INSULIN ASPART 1 UNITS: 100 INJECTION, SOLUTION INTRAVENOUS; SUBCUTANEOUS at 09:11

## 2022-01-01 RX ADMIN — MUPIROCIN: 20 OINTMENT TOPICAL at 08:11

## 2022-01-01 RX ADMIN — CLONIDINE 1 PATCH: 0.3 PATCH TRANSDERMAL at 10:11

## 2022-01-01 RX ADMIN — ATORVASTATIN CALCIUM 40 MG: 40 TABLET, FILM COATED ORAL at 12:11

## 2022-01-01 RX ADMIN — ISOSORBIDE MONONITRATE 30 MG: 30 TABLET, EXTENDED RELEASE ORAL at 09:12

## 2022-01-01 RX ADMIN — NIFEDIPINE 90 MG: 30 TABLET, FILM COATED, EXTENDED RELEASE ORAL at 10:11

## 2022-01-01 RX ADMIN — INSULIN ASPART 2 UNITS: 100 INJECTION, SOLUTION INTRAVENOUS; SUBCUTANEOUS at 10:11

## 2022-01-01 RX ADMIN — APIXABAN 5 MG: 5 TABLET, FILM COATED ORAL at 01:11

## 2022-01-01 RX ADMIN — INSULIN ASPART 6 UNITS: 100 INJECTION, SOLUTION INTRAVENOUS; SUBCUTANEOUS at 04:11

## 2022-01-01 RX ADMIN — NIFEDIPINE 90 MG: 30 TABLET, FILM COATED, EXTENDED RELEASE ORAL at 12:11

## 2022-01-01 RX ADMIN — HYDRALAZINE HYDROCHLORIDE 10 MG: 20 INJECTION, SOLUTION INTRAMUSCULAR; INTRAVENOUS at 12:12

## 2022-01-01 RX ADMIN — HYDRALAZINE HYDROCHLORIDE 100 MG: 50 TABLET ORAL at 10:12

## 2022-01-01 RX ADMIN — TIOTROPIUM BROMIDE INHALATION SPRAY 2 PUFF: 3.12 SPRAY, METERED RESPIRATORY (INHALATION) at 05:12

## 2022-01-01 RX ADMIN — CARVEDILOL 6.25 MG: 6.25 TABLET, FILM COATED ORAL at 11:11

## 2022-01-01 RX ADMIN — INSULIN ASPART 1 UNITS: 100 INJECTION, SOLUTION INTRAVENOUS; SUBCUTANEOUS at 10:11

## 2022-01-01 RX ADMIN — NIFEDIPINE 90 MG: 60 TABLET, FILM COATED, EXTENDED RELEASE ORAL at 10:12

## 2022-01-01 RX ADMIN — IPRATROPIUM BROMIDE AND ALBUTEROL SULFATE 3 ML: 2.5; .5 SOLUTION RESPIRATORY (INHALATION) at 12:11

## 2022-01-01 RX ADMIN — INSULIN ASPART 5 UNITS: 100 INJECTION, SOLUTION INTRAVENOUS; SUBCUTANEOUS at 05:11

## 2022-01-01 RX ADMIN — ATORVASTATIN CALCIUM 40 MG: 40 TABLET, FILM COATED ORAL at 09:11

## 2022-01-01 RX ADMIN — NIFEDIPINE 60 MG: 30 TABLET, FILM COATED, EXTENDED RELEASE ORAL at 12:11

## 2022-01-01 RX ADMIN — INSULIN ASPART 5 UNITS: 100 INJECTION, SOLUTION INTRAVENOUS; SUBCUTANEOUS at 04:11

## 2022-01-01 RX ADMIN — ASPIRIN 81 MG: 81 TABLET, COATED ORAL at 09:11

## 2022-01-01 RX ADMIN — INSULIN DETEMIR 5 UNITS: 100 INJECTION, SOLUTION SUBCUTANEOUS at 09:12

## 2022-01-01 RX ADMIN — CALCIUM ACETATE 667 MG: 667 CAPSULE ORAL at 04:11

## 2022-01-01 RX ADMIN — INSULIN ASPART 5 UNITS: 100 INJECTION, SOLUTION INTRAVENOUS; SUBCUTANEOUS at 09:11

## 2022-01-01 RX ADMIN — INSULIN DETEMIR 5 UNITS: 100 INJECTION, SOLUTION SUBCUTANEOUS at 09:11

## 2022-01-01 RX ADMIN — INSULIN ASPART 1 UNITS: 100 INJECTION, SOLUTION INTRAVENOUS; SUBCUTANEOUS at 10:12

## 2022-01-01 RX ADMIN — HYDRALAZINE HYDROCHLORIDE 100 MG: 50 TABLET ORAL at 12:11

## 2022-01-01 RX ADMIN — NIFEDIPINE 30 MG: 30 TABLET, FILM COATED, EXTENDED RELEASE ORAL at 11:11

## 2022-01-01 RX ADMIN — CARVEDILOL 6.25 MG: 6.25 TABLET, FILM COATED ORAL at 06:12

## 2022-01-01 RX ADMIN — INSULIN ASPART 5 UNITS: 100 INJECTION, SOLUTION INTRAVENOUS; SUBCUTANEOUS at 10:11

## 2022-01-01 RX ADMIN — HYDRALAZINE HYDROCHLORIDE 100 MG: 50 TABLET ORAL at 05:11

## 2022-01-01 RX ADMIN — INSULIN DETEMIR 5 UNITS: 100 INJECTION, SOLUTION SUBCUTANEOUS at 12:12

## 2022-01-01 RX ADMIN — Medication 24 G: at 06:12

## 2022-01-01 RX ADMIN — INSULIN ASPART 5 UNITS: 100 INJECTION, SOLUTION INTRAVENOUS; SUBCUTANEOUS at 03:11

## 2022-01-01 RX ADMIN — DIVALPROEX SODIUM 125 MG: 125 CAPSULE, COATED PELLETS ORAL at 05:11

## 2022-01-01 RX ADMIN — INSULIN ASPART 5 UNITS: 100 INJECTION, SOLUTION INTRAVENOUS; SUBCUTANEOUS at 06:11

## 2022-10-11 ENCOUNTER — HOSPITAL ENCOUNTER (INPATIENT)
Facility: HOSPITAL | Age: 57
LOS: 3 days | Discharge: NURSING, REHAB OR LONG-TERM CARE | DRG: 871 | End: 2022-10-15
Attending: EMERGENCY MEDICINE | Admitting: STUDENT IN AN ORGANIZED HEALTH CARE EDUCATION/TRAINING PROGRAM
Payer: MEDICARE

## 2022-10-11 DIAGNOSIS — R06.02 SHORTNESS OF BREATH: ICD-10-CM

## 2022-10-11 DIAGNOSIS — Z99.2 HEMODIALYSIS STATUS: ICD-10-CM

## 2022-10-11 DIAGNOSIS — I10 SEVERE HYPERTENSION: ICD-10-CM

## 2022-10-11 DIAGNOSIS — L03.116 CELLULITIS OF LEG, LEFT: Primary | ICD-10-CM

## 2022-10-11 DIAGNOSIS — R06.02 SOB (SHORTNESS OF BREATH): ICD-10-CM

## 2022-10-11 DIAGNOSIS — S81.802A WOUND OF LEFT LOWER EXTREMITY, INITIAL ENCOUNTER: ICD-10-CM

## 2022-10-11 DIAGNOSIS — I26.94 MULTIPLE SUBSEGMENTAL PULMONARY EMBOLI WITHOUT ACUTE COR PULMONALE: ICD-10-CM

## 2022-10-11 DIAGNOSIS — M79.89 LEG SWELLING: ICD-10-CM

## 2022-10-11 DIAGNOSIS — M79.673 FOOT PAIN: ICD-10-CM

## 2022-10-11 DIAGNOSIS — A49.01 STAPH AUREUS INFECTION: ICD-10-CM

## 2022-10-11 DIAGNOSIS — I50.21 ACUTE HFREF (HEART FAILURE WITH REDUCED EJECTION FRACTION): ICD-10-CM

## 2022-10-11 DIAGNOSIS — L03.116 CELLULITIS OF LEFT LOWER EXTREMITY: ICD-10-CM

## 2022-10-11 DIAGNOSIS — R73.9 HYPERGLYCEMIA WITHOUT KETOSIS: ICD-10-CM

## 2022-10-11 LAB
ALBUMIN SERPL BCP-MCNC: 2.7 G/DL (ref 3.5–5.2)
ALP SERPL-CCNC: 288 U/L (ref 55–135)
ALT SERPL W/O P-5'-P-CCNC: 17 U/L (ref 10–44)
ANION GAP SERPL CALC-SCNC: 16 MMOL/L (ref 8–16)
AST SERPL-CCNC: 37 U/L (ref 10–40)
BASOPHILS # BLD AUTO: 0.02 K/UL (ref 0–0.2)
BASOPHILS NFR BLD: 0.3 % (ref 0–1.9)
BILIRUB SERPL-MCNC: 0.8 MG/DL (ref 0.1–1)
BUN SERPL-MCNC: 32 MG/DL (ref 6–20)
CALCIUM SERPL-MCNC: 8.5 MG/DL (ref 8.7–10.5)
CHLORIDE SERPL-SCNC: 91 MMOL/L (ref 95–110)
CO2 SERPL-SCNC: 24 MMOL/L (ref 23–29)
CREAT SERPL-MCNC: 4.5 MG/DL (ref 0.5–1.4)
CRP SERPL-MCNC: 73.4 MG/L (ref 0–8.2)
DIFFERENTIAL METHOD: ABNORMAL
EOSINOPHIL # BLD AUTO: 0.1 K/UL (ref 0–0.5)
EOSINOPHIL NFR BLD: 0.9 % (ref 0–8)
ERYTHROCYTE [DISTWIDTH] IN BLOOD BY AUTOMATED COUNT: 15.6 % (ref 11.5–14.5)
ERYTHROCYTE [SEDIMENTATION RATE] IN BLOOD BY PHOTOMETRIC METHOD: 55 MM/HR (ref 0–23)
EST. GFR  (NO RACE VARIABLE): 14.3 ML/MIN/1.73 M^2
GLUCOSE SERPL-MCNC: 484 MG/DL (ref 70–110)
HCT VFR BLD AUTO: 30.9 % (ref 40–54)
HGB BLD-MCNC: 8.9 G/DL (ref 14–18)
IMM GRANULOCYTES # BLD AUTO: 0.02 K/UL (ref 0–0.04)
IMM GRANULOCYTES NFR BLD AUTO: 0.3 % (ref 0–0.5)
LYMPHOCYTES # BLD AUTO: 1 K/UL (ref 1–4.8)
LYMPHOCYTES NFR BLD: 15.1 % (ref 18–48)
MCH RBC QN AUTO: 28.1 PG (ref 27–31)
MCHC RBC AUTO-ENTMCNC: 28.8 G/DL (ref 32–36)
MCV RBC AUTO: 98 FL (ref 82–98)
MONOCYTES # BLD AUTO: 0.7 K/UL (ref 0.3–1)
MONOCYTES NFR BLD: 11.2 % (ref 4–15)
NEUTROPHILS # BLD AUTO: 4.7 K/UL (ref 1.8–7.7)
NEUTROPHILS NFR BLD: 72.2 % (ref 38–73)
NRBC BLD-RTO: 0 /100 WBC
PLATELET # BLD AUTO: 253 K/UL (ref 150–450)
PMV BLD AUTO: 11.4 FL (ref 9.2–12.9)
POCT GLUCOSE: 491 MG/DL (ref 70–110)
POCT GLUCOSE: >500 MG/DL (ref 70–110)
POTASSIUM SERPL-SCNC: 4.9 MMOL/L (ref 3.5–5.1)
PROT SERPL-MCNC: 7.4 G/DL (ref 6–8.4)
RBC # BLD AUTO: 3.17 M/UL (ref 4.6–6.2)
SODIUM SERPL-SCNC: 131 MMOL/L (ref 136–145)
WBC # BLD AUTO: 6.51 K/UL (ref 3.9–12.7)

## 2022-10-11 PROCEDURE — 87150 DNA/RNA AMPLIFIED PROBE: CPT

## 2022-10-11 PROCEDURE — 96365 THER/PROPH/DIAG IV INF INIT: CPT

## 2022-10-11 PROCEDURE — 96375 TX/PRO/DX INJ NEW DRUG ADDON: CPT

## 2022-10-11 PROCEDURE — 86140 C-REACTIVE PROTEIN: CPT

## 2022-10-11 PROCEDURE — 87186 SC STD MICRODIL/AGAR DIL: CPT

## 2022-10-11 PROCEDURE — 85025 COMPLETE CBC W/AUTO DIFF WBC: CPT

## 2022-10-11 PROCEDURE — 99285 EMERGENCY DEPT VISIT HI MDM: CPT | Mod: GC,,, | Performed by: EMERGENCY MEDICINE

## 2022-10-11 PROCEDURE — 87077 CULTURE AEROBIC IDENTIFY: CPT

## 2022-10-11 PROCEDURE — 25000003 PHARM REV CODE 250

## 2022-10-11 PROCEDURE — 87040 BLOOD CULTURE FOR BACTERIA: CPT | Mod: 59

## 2022-10-11 PROCEDURE — 99285 PR EMERGENCY DEPT VISIT,LEVEL V: ICD-10-PCS | Mod: GC,,, | Performed by: EMERGENCY MEDICINE

## 2022-10-11 PROCEDURE — 63600175 PHARM REV CODE 636 W HCPCS

## 2022-10-11 PROCEDURE — 85652 RBC SED RATE AUTOMATED: CPT

## 2022-10-11 PROCEDURE — 82962 GLUCOSE BLOOD TEST: CPT | Mod: 59

## 2022-10-11 PROCEDURE — 99285 EMERGENCY DEPT VISIT HI MDM: CPT | Mod: 25

## 2022-10-11 PROCEDURE — 96372 THER/PROPH/DIAG INJ SC/IM: CPT

## 2022-10-11 PROCEDURE — 96366 THER/PROPH/DIAG IV INF ADDON: CPT

## 2022-10-11 PROCEDURE — 96367 TX/PROPH/DG ADDL SEQ IV INF: CPT

## 2022-10-11 PROCEDURE — 80053 COMPREHEN METABOLIC PANEL: CPT

## 2022-10-11 RX ORDER — HYDROCODONE BITARTRATE AND ACETAMINOPHEN 5; 325 MG/1; MG/1
1 TABLET ORAL
Status: COMPLETED | OUTPATIENT
Start: 2022-10-11 | End: 2022-10-11

## 2022-10-11 RX ORDER — VANCOMYCIN HCL IN 5 % DEXTROSE 1G/250ML
1000 PLASTIC BAG, INJECTION (ML) INTRAVENOUS
Status: DISCONTINUED | OUTPATIENT
Start: 2022-10-11 | End: 2022-10-11

## 2022-10-11 RX ORDER — HYDRALAZINE HYDROCHLORIDE 20 MG/ML
10 INJECTION INTRAMUSCULAR; INTRAVENOUS
Status: COMPLETED | OUTPATIENT
Start: 2022-10-11 | End: 2022-10-12

## 2022-10-11 RX ORDER — INSULIN ASPART 100 [IU]/ML
14 INJECTION, SOLUTION INTRAVENOUS; SUBCUTANEOUS
Status: COMPLETED | OUTPATIENT
Start: 2022-10-11 | End: 2022-10-11

## 2022-10-11 RX ADMIN — INSULIN ASPART 14 UNITS: 100 INJECTION, SOLUTION INTRAVENOUS; SUBCUTANEOUS at 09:10

## 2022-10-11 RX ADMIN — VANCOMYCIN HYDROCHLORIDE 1750 MG: 500 INJECTION, POWDER, LYOPHILIZED, FOR SOLUTION INTRAVENOUS at 10:10

## 2022-10-11 RX ADMIN — CEFTRIAXONE 1 G: 1 INJECTION, SOLUTION INTRAVENOUS at 10:10

## 2022-10-11 RX ADMIN — HYDROCODONE BITARTRATE AND ACETAMINOPHEN 1 TABLET: 5; 325 TABLET ORAL at 06:10

## 2022-10-11 NOTE — ED PROVIDER NOTES
Encounter Date: 10/11/2022       History     Chief Complaint   Patient presents with    Leg Swelling     Left leg swelling primarily calf. Endorses pain throughout the entire leg. Arrives from [facility]. .      Pt is a 57 year old male with PMHx significant for ESRD(on dialysis MWF), HTN, and COPD who presents from nursing home for evaluation of acute and worsening left lower extremity swelling, which began 3 days ago. He notes an associated leg pain. No fever, chills, chest pain, nausea, or vomiting. No exacerbating or alleviating factors. He ha not taken any medications for pain.     The history is provided by the patient and medical records.   Review of patient's allergies indicates:  No Known Allergies  Past Medical History:   Diagnosis Date    COPD (chronic obstructive pulmonary disease)     Diabetes mellitus type 1     ESRD on dialysis     Hypertension     SOB (shortness of breath) 10/12/2022    Patient with history COPD treated with Ellipta the albuterol, fluticasone-salmeterol tachypneic in the ED saturating 94% and 6 L on nasal cannula, patient does not know how many  he uses it is in nursing home.    -duo nebs Q 4  Given that patient is a poor historian, and in the setting of left lower extremity edema and history of coagulopathy PE cannot be ruled out.  Will workup for possible infec     Past Surgical History:   Procedure Laterality Date    AV FISTULA PLACEMENT       Family History   Problem Relation Age of Onset    Diabetes Mother      Social History     Tobacco Use    Smoking status: Never    Smokeless tobacco: Never   Substance Use Topics    Alcohol use: Never     Review of Systems   Constitutional:  Negative for chills and fever.   HENT:  Negative for ear discharge and ear pain.    Eyes:  Negative for photophobia and visual disturbance.   Respiratory:  Negative for cough and choking.    Cardiovascular:  Positive for leg swelling. Negative for chest pain and palpitations.   Gastrointestinal:   "Negative for constipation, diarrhea, nausea and vomiting.   Genitourinary:  Negative for dysuria and flank pain.   Musculoskeletal:  Negative for neck pain.        Leg pain   Skin:  Negative for rash and wound.   Neurological:  Negative for weakness, numbness and headaches.     Physical Exam     Initial Vitals [10/11/22 1627]   BP Pulse Resp Temp SpO2   (!) 185/87 75 18 98.3 °F (36.8 °C) 96 %      MAP       --         Physical Exam    Nursing note and vitals reviewed.  Constitutional: He appears well-developed and well-nourished. No distress.   HENT:   Head: Normocephalic and atraumatic.   Eyes: EOM are normal. No scleral icterus.   Neck: Neck supple.   Cardiovascular:  Normal rate, regular rhythm and intact distal pulses.           No murmur heard.  Dialysis graft noted to left upper extremity with palpable "thrill"   Pulmonary/Chest: Breath sounds normal. No stridor. No respiratory distress.   Abdominal: Abdomen is soft. There is no abdominal tenderness.   Musculoskeletal:         General: Normal range of motion.      Cervical back: Neck supple.      Comments: Left lower extremity pitting edema. Tenderness to palpation of left lower leg      Neurological: He is alert and oriented to person, place, and time. He has normal strength. No sensory deficit.   Skin: Skin is warm and dry. No abscess noted. No erythema.   Ulcerated wound at base of left great toe       ED Course   Procedures  Labs Reviewed   CBC W/ AUTO DIFFERENTIAL - Abnormal; Notable for the following components:       Result Value    RBC 3.17 (*)     Hemoglobin 8.9 (*)     Hematocrit 30.9 (*)     MCHC 28.8 (*)     RDW 15.6 (*)     Lymph % 15.1 (*)     All other components within normal limits   COMPREHENSIVE METABOLIC PANEL - Abnormal; Notable for the following components:    Sodium 131 (*)     Chloride 91 (*)     Glucose 484 (*)     BUN 32 (*)     Creatinine 4.5 (*)     Calcium 8.5 (*)     Albumin 2.7 (*)     Alkaline Phosphatase 288 (*)     eGFR 14.3 " (*)     All other components within normal limits   SEDIMENTATION RATE - Abnormal; Notable for the following components:    Sed Rate 55 (*)     All other components within normal limits   C-REACTIVE PROTEIN - Abnormal; Notable for the following components:    CRP 73.4 (*)     All other components within normal limits   HEMOGLOBIN A1C - Abnormal; Notable for the following components:    Hemoglobin A1C 11.9 (*)     Estimated Avg Glucose 295 (*)     All other components within normal limits   D DIMER, QUANTITATIVE - Abnormal; Notable for the following components:    D-Dimer 3.05 (*)     All other components within normal limits   TROPONIN I - Abnormal; Notable for the following components:    Troponin I 0.033 (*)     All other components within normal limits   BASIC METABOLIC PANEL - Abnormal; Notable for the following components:    Sodium 133 (*)     Chloride 93 (*)     Glucose 447 (*)     BUN 36 (*)     Creatinine 5.1 (*)     Calcium 8.2 (*)     eGFR 12.3 (*)     All other components within normal limits   APTT - Abnormal; Notable for the following components:    aPTT 35.2 (*)     All other components within normal limits   PROTIME-INR - Abnormal; Notable for the following components:    Prothrombin Time 12.9 (*)     INR 1.3 (*)     All other components within normal limits   B-TYPE NATRIURETIC PEPTIDE - Abnormal; Notable for the following components:    BNP 2,772 (*)     All other components within normal limits   LIPID PANEL - Abnormal; Notable for the following components:    Cholesterol 85 (*)     HDL 33 (*)     LDL Cholesterol 40.4 (*)     All other components within normal limits   POCT GLUCOSE - Abnormal; Notable for the following components:    POCT Glucose >500 (*)     All other components within normal limits   POCT GLUCOSE - Abnormal; Notable for the following components:    POCT Glucose 491 (*)     All other components within normal limits   ISTAT PROCEDURE - Abnormal; Notable for the following  components:    POC PCO2 52.4 (*)     POC PO2 74 (*)     POC HCO3 31.9 (*)     POC SATURATED O2 94 (*)     POC TCO2 33 (*)     All other components within normal limits   CULTURE, RESPIRATORY   POCT GLUCOSE MONITORING CONTINUOUS        ECG Results              EKG 12-lead (Final result)  Result time 10/12/22 10:56:52      Final result by Interface, Lab In [facility] (10/12/22 10:56:52)                   Narrative:    Test Reason : R06.02,    Vent. Rate : 086 BPM     Atrial Rate : 086 BPM     P-R Int : 178 ms          QRS Dur : 094 ms      QT Int : 424 ms       P-R-T Axes : 063 070 060 degrees     QTc Int : 507 ms    Sinus rhythm with Premature atrial complexes with Aberrant conduction  Indeterminate axis  Low voltage QRS  Incomplete right bundle branch block  Prolonged QT  Abnormal ECG  No previous ECGs available  Confirmed by Go SHANKAR MD ([id number]) on 10/12/2022 10:56:43 AM    Referred By: [email or web address]   SELF           Confirmed By:Go SHANKAR MD                                  Imaging Results              X-Ray Chest AP Portable (Final result)  Result time 10/12/22 06:03:59      Final result by Juventino Castro MD (10/12/22 06:03:59)                   Impression:      1. Pulmonary findings may reflect diffuse edema or other nonspecific infectious/inflammatory pneumonitis.  Correlation is advised.  Follow-up recommended.      Electronically signed by: Juventino Castro MD  Date:    10/12/2022  Time:    06:03               Narrative:    EXAMINATION:  XR CHEST AP PORTABLE    CLINICAL HISTORY:  shortness of breath;    TECHNIQUE:  Single frontal view of the chest was performed.    COMPARISON:  None    FINDINGS:  The cardiomediastinal silhouette is prominent noting some magnification by technique.  There is calcification of the aorta..  There is no pleural effusion.  The trachea is midline.  The lungs are symmetrically expanded bilaterally with patchy increased interstitial and parenchymal attenuation bilaterally.  No large  focal consolidation seen.  There is no pneumothorax.  The osseous structures are remarkable for degenerative change..                                       X-Ray Foot Complete Left (Final result)  Result time 10/11/22 21:10:09      Final result by Aguilar Garcia MD (10/11/22 21:10:09)                   Impression:      Remote appearing fracture deformity of the 5th proximal phalanx.  Suggest correlation with point tenderness.      Electronically signed by: Aguilar Garcia MD  Date:    10/11/2022  Time:    21:10               Narrative:    EXAMINATION:  XR FOOT COMPLETE 3 VIEW LEFT    CLINICAL HISTORY:  Pain in unspecified foot    TECHNIQUE:  Three views of the left foot.    COMPARISON:  None.    FINDINGS:  Remote appearing fracture of the 5th proximal phalanx.  No acute displaced fracture.  No dislocation.  There is mild soft tissue edema.  Atherosclerotic vascular calcifications noted.                                       US Lower Extremity Veins Left (Final result)  Result time 10/11/22 20:08:03      Final result by Juventino Castro MD (10/11/22 20:08:03)                   Impression:      No evidence of deep venous thrombosis in the left lower extremity.    Nonspecific left inguinal lymphadenopathy.      Electronically signed by: Juventino Castro MD  Date:    10/11/2022  Time:    20:08               Narrative:    EXAMINATION:  US LOWER EXTREMITY VEINS LEFT    CLINICAL HISTORY:  Other specified soft tissue disorders    TECHNIQUE:  Duplex and color flow Doppler evaluation and graded compression of the left lower extremity veins was performed.    COMPARISON:  None    FINDINGS:  Duplex and color flow Doppler evaluation does not reveal any evidence of acute venous thrombosis in the common femoral, superficial femoral, greater saphenous, popliteal, peroneal, anterior tibial and posterior tibial veins of the left lower extremity.  There is no reflux to suggest valvular incompetence.    Left inguinal  lymphadenopathy.                                       Medications   HYDROmorphone injection 1 mg (1 mg Intravenous Given 10/13/22 0847)   albuterol-ipratropium 2.5 mg-0.5 mg/3 mL nebulizer solution 3 mL (3 mLs Nebulization Given 10/13/22 0341)   atorvastatin tablet 40 mg (40 mg Oral Given 10/13/22 0851)   FLUoxetine capsule 40 mg (40 mg Oral Given 10/13/22 0851)   cloNIDine tablet 0.3 mg (0.3 mg Oral Given 10/13/22 0851)   cetirizine tablet 10 mg (10 mg Oral Given 10/13/22 0851)   glucose chewable tablet 16 g (has no administration in time range)   glucose chewable tablet 24 g (has no administration in time range)   glucagon (human recombinant) injection 1 mg (has no administration in time range)   dextrose 10% bolus 125 mL (has no administration in time range)   dextrose 10% bolus 250 mL (has no administration in time range)   insulin aspart U-100 pen 1-10 Units (10 Units Subcutaneous Given 10/12/22 1145)   NIFEdipine 24 hr tablet 60 mg (60 mg Oral Given 10/13/22 0850)   labetaloL tablet 100 mg (100 mg Oral Given 10/13/22 0850)   isosorbide mononitrate 24 hr tablet 30 mg (30 mg Oral Given 10/13/22 0850)   sevelamer carbonate tablet 800 mg (800 mg Oral Given 10/13/22 0850)   vancomycin - pharmacy to dose (has no administration in time range)   cefepime in dextrose 5 % 1 gram/50 mL IVPB 1 g (0 g Intravenous Stopped 10/12/22 1257)   hydrALAZINE tablet 25 mg (has no administration in time range)   heparin 25,000 units in dextrose 5% 250 mL (100 units/mL) infusion HIGH INTENSITY nomogram Anti- Xa (21 Units/kg/hr × 79.6 kg (Adjusted) Intravenous New Bag 10/13/22 0554)   heparin 25,000 units in dextrose 5% (100 units/ml) IV bolus from bag - ADDITIONAL PRN BOLUS - 60 units/kg (4,776 Units Intravenous Bolus from Bag 10/13/22 0515)   heparin 25,000 units in dextrose 5% (100 units/ml) IV bolus from bag - ADDITIONAL PRN BOLUS - 30 units/kg (has no administration in time range)   insulin detemir U-100 pen 15 Units (15 Units  Subcutaneous Given 10/12/22 2016)   insulin aspart U-100 pen 10 Units (has no administration in time range)   0.9%  NaCl infusion (has no administration in time range)   HYDROcodone-acetaminophen 5-325 mg per tablet 1 tablet (1 tablet Oral Given 10/11/22 1839)   insulin aspart U-100 pen 14 Units (14 Units Subcutaneous Given 10/11/22 2100)   cefTRIAXone (ROCEPHIN) 1 g/50 mL D5W IVPB (0 g Intravenous Stopped 10/11/22 2244)   vancomycin 1.75 g in 5 % dextrose 500 mL IVPB (0 mg Intravenous Stopped 10/12/22 0123)   hydrALAZINE injection 10 mg (10 mg Intravenous Given 10/12/22 0006)   hydrALAZINE injection 10 mg (10 mg Intravenous Given 10/12/22 0205)   hydrALAZINE tablet 50 mg (50 mg Oral Given 10/12/22 0205)   cloNIDine tablet 0.1 mg (0.1 mg Oral Given 10/12/22 0311)   hydrALAZINE injection 10 mg (10 mg Intravenous Given 10/12/22 0311)   diatrizoate meglumineand-diatrizoate sodium (GASTROGRAFFIN) solution 30 mL (30 mLs Oral Given 10/12/22 1123)   furosemide injection 60 mg (60 mg Intravenous Given 10/12/22 0852)   0.9%  NaCl infusion ( Intravenous New Bag 10/12/22 1417)   iohexoL (OMNIPAQUE 350) injection 100 mL (100 mLs Intravenous Given 10/12/22 1357)   heparin 25,000 units in dextrose 5% (100 units/ml) IV bolus from bag INITIAL BOLUS (25,000 Units Intravenous Bolus from Bag 10/12/22 1952)   vancomycin 500 mg in dextrose 5 % 100 mL IVPB (ready to mix system) (0 mg Intravenous Stopped 10/13/22 0219)     Medical Decision Making:   History:   Old Medical Records: I decided to obtain old medical records.  Initial Assessment:   Pt is a 57 year old male with PMHx significant for ESRD(on dialysis MWF), HTN, and COPD who presents for evaluation of left lower extremity swelling, which began 3 days ago. He notes an associated leg pain  Differential Diagnosis:   Differential includes but is not limited to: DVT, PAD, cellulitis, osteomyelitis   Clinical Tests:   Lab Tests: Ordered and Reviewed  Radiological Study: Ordered and  Reviewed  Medical Tests: Ordered and Reviewed  ED Management:  Lower extremity ultrasound negative for DVT. Pt with good peripheral pulses on bedside doppler. CRP elevated. Concern for cellulitis vs osteomyelitis. Pt evaluated with x-ray of the foot which did not demonstrate any acute process. Pt noted to be hyperglycemic at greater than 500. Concern for diabetic foot wound and poorly controlled diabetes. Pt treated with sliding scale insulin and antibiotics with plan to admit. Pt signed out to oncoming resident pending admission.   Other:   I have discussed this case with another health care provider.          Attending Attestation:   Physician Attestation Statement for Resident:  As the supervising MD   Physician Attestation Statement: I have personally seen and examined this patient.   I agree with the above history.  -: 57-year-old man with comorbidities of ESRD/HD presents to the emergency department for evaluation of worsening left lower leg discomfort without history of trauma.   As the supervising MD I agree with the above PE.     As the supervising MD I agree with the above treatment, course, plan, and disposition.    I have reviewed and agree with the residents interpretation of the following: lab data, x-rays and EKG.  I have reviewed the following: old records at this facility.                           Clinical Impression:   Final diagnoses:  [M79.89] Leg swelling  [M79.673] Foot pain  [L03.116] Cellulitis of leg, left (Primary)  [I10] Severe hypertension  [Z99.2] Hemodialysis status  [R73.9] Hyperglycemia without ketosis        ED Disposition Condition    Observation Stable                Jr. Lance Razo MD  Resident  10/12/22 0027       Jhony Walsh MD  10/13/22 0854

## 2022-10-11 NOTE — Clinical Note
Diagnosis: Cellulitis of leg, left [219040]   Future Attending Provider: ESHA SPEARS [[id number]]   Admitting Provider:: ESHA SPEARS [[id number]]   Special Needs:: No Special Needs [1]

## 2022-10-11 NOTE — ED NOTES
"Patient identifiers verified and correct for Cosme Lewis  C/C: Pt states "My L leg is hurting and swollen"  APPEARANCE: awake and alert in no acute distress.  SKIN: warm, dry and intact. No breakdown or bruising. Dialysis shunt noted to L arm with palpable thrill  MUSCULOSKELETAL: Patient moving all extremities spontaneously, no obvious swelling or deformities noted. Ambulates independently, c/o weakness in L leg due to pain. Peripheral edema noted to L leg as well.  RESPIRATORY: confirms shortness of breath. Respirations unlabored. Arrives on 4 L of O2 via nasal cannula, pt states he is normally on this at home  CARDIAC: Denies CP, 2+ distal pulses; no peripheral edema  ABDOMEN: soft, non-tender, and non-distended, Denies N/V  : voids spontaneously, denies difficulty  Neurologic: AAO x 4; follows commands equal strength in all extremities; denies numbness/tingling. Denies dizziness   "

## 2022-10-12 PROBLEM — J96.21 ACUTE ON CHRONIC RESPIRATORY FAILURE WITH HYPOXIA: Status: ACTIVE | Noted: 2022-10-12

## 2022-10-12 PROBLEM — N18.6 ANEMIA DUE TO CHRONIC KIDNEY DISEASE, ON CHRONIC DIALYSIS: Status: ACTIVE | Noted: 2022-10-12

## 2022-10-12 PROBLEM — J96.01 SEPSIS WITH ACUTE HYPOXIC RESPIRATORY FAILURE WITHOUT SEPTIC SHOCK: Status: ACTIVE | Noted: 2022-10-12

## 2022-10-12 PROBLEM — R06.02 SOB (SHORTNESS OF BREATH): Chronic | Status: ACTIVE | Noted: 2022-10-12

## 2022-10-12 PROBLEM — R65.20 SEPSIS WITH ACUTE HYPOXIC RESPIRATORY FAILURE WITHOUT SEPTIC SHOCK: Status: ACTIVE | Noted: 2022-10-12

## 2022-10-12 PROBLEM — L03.116 CELLULITIS OF LEG, LEFT: Status: ACTIVE | Noted: 2022-10-12

## 2022-10-12 PROBLEM — R78.81 GRAM-POSITIVE BACTEREMIA: Status: ACTIVE | Noted: 2022-10-12

## 2022-10-12 PROBLEM — D63.1 ANEMIA DUE TO CHRONIC KIDNEY DISEASE, ON CHRONIC DIALYSIS: Status: ACTIVE | Noted: 2022-10-12

## 2022-10-12 PROBLEM — M89.9 CHRONIC KIDNEY DISEASE-MINERAL AND BONE DISORDER: Status: ACTIVE | Noted: 2022-10-12

## 2022-10-12 PROBLEM — N18.6 TYPE 1 DIABETES MELLITUS WITH CHRONIC KIDNEY DISEASE ON CHRONIC DIALYSIS: Status: ACTIVE | Noted: 2022-10-12

## 2022-10-12 PROBLEM — N18.9 CHRONIC KIDNEY DISEASE-MINERAL AND BONE DISORDER: Status: ACTIVE | Noted: 2022-10-12

## 2022-10-12 PROBLEM — R94.31 QT PROLONGATION: Status: ACTIVE | Noted: 2022-10-12

## 2022-10-12 PROBLEM — Z99.2 TYPE 1 DIABETES MELLITUS WITH CHRONIC KIDNEY DISEASE ON CHRONIC DIALYSIS: Status: ACTIVE | Noted: 2022-10-12

## 2022-10-12 PROBLEM — J96.11 CHRONIC HYPOXEMIC RESPIRATORY FAILURE: Chronic | Status: ACTIVE | Noted: 2022-10-12

## 2022-10-12 PROBLEM — Z99.2 ANEMIA DUE TO CHRONIC KIDNEY DISEASE, ON CHRONIC DIALYSIS: Status: ACTIVE | Noted: 2022-10-12

## 2022-10-12 PROBLEM — E10.22 TYPE 1 DIABETES MELLITUS WITH CHRONIC KIDNEY DISEASE ON CHRONIC DIALYSIS: Status: ACTIVE | Noted: 2022-10-12

## 2022-10-12 PROBLEM — N18.5 ANEMIA OF CHRONIC KIDNEY FAILURE, STAGE 5: Status: ACTIVE | Noted: 2022-10-12

## 2022-10-12 PROBLEM — I50.21 ACUTE HFREF (HEART FAILURE WITH REDUCED EJECTION FRACTION): Status: ACTIVE | Noted: 2022-10-12

## 2022-10-12 PROBLEM — E83.9 CHRONIC KIDNEY DISEASE-MINERAL AND BONE DISORDER: Status: ACTIVE | Noted: 2022-10-12

## 2022-10-12 PROBLEM — J44.9 COPD (CHRONIC OBSTRUCTIVE PULMONARY DISEASE): Status: ACTIVE | Noted: 2022-10-12

## 2022-10-12 PROBLEM — A41.9 SEPSIS WITH ACUTE HYPOXIC RESPIRATORY FAILURE WITHOUT SEPTIC SHOCK: Status: ACTIVE | Noted: 2022-10-12

## 2022-10-12 LAB
ALBUMIN SERPL BCP-MCNC: 2.5 G/DL (ref 3.5–5.2)
ALLENS TEST: ABNORMAL
ALP SERPL-CCNC: 243 U/L (ref 55–135)
ALT SERPL W/O P-5'-P-CCNC: 13 U/L (ref 10–44)
ANION GAP SERPL CALC-SCNC: 13 MMOL/L (ref 8–16)
APTT BLDCRRT: 35.2 SEC (ref 21–32)
ASCENDING AORTA: 3.3 CM
AST SERPL-CCNC: 17 U/L (ref 10–40)
AV INDEX (PROSTH): 0.62
AV MEAN GRADIENT: 7 MMHG
AV PEAK GRADIENT: 12 MMHG
AV VALVE AREA: 2.32 CM2
AV VELOCITY RATIO: 0.62
BASOPHILS # BLD AUTO: 0.02 K/UL (ref 0–0.2)
BASOPHILS NFR BLD: 0.3 % (ref 0–1.9)
BILIRUB DIRECT SERPL-MCNC: 0.4 MG/DL (ref 0.1–0.3)
BILIRUB SERPL-MCNC: 0.6 MG/DL (ref 0.1–1)
BNP SERPL-MCNC: 2772 PG/ML (ref 0–99)
BSA FOR ECHO PROCEDURE: 2.13 M2
BUN SERPL-MCNC: 36 MG/DL (ref 6–20)
CALCIUM SERPL-MCNC: 8.2 MG/DL (ref 8.7–10.5)
CHLORIDE SERPL-SCNC: 93 MMOL/L (ref 95–110)
CHOLEST SERPL-MCNC: 85 MG/DL (ref 120–199)
CHOLEST/HDLC SERPL: 2.6 {RATIO} (ref 2–5)
CO2 SERPL-SCNC: 27 MMOL/L (ref 23–29)
CREAT SERPL-MCNC: 5.1 MG/DL (ref 0.5–1.4)
CV ECHO LV RWT: 0.48 CM
D DIMER PPP IA.FEU-MCNC: 3.05 MG/L FEU
DELSYS: ABNORMAL
DIFFERENTIAL METHOD: ABNORMAL
DOP CALC AO PEAK VEL: 1.74 M/S
DOP CALC AO VTI: 35.09 CM
DOP CALC LVOT AREA: 3.7 CM2
DOP CALC LVOT DIAMETER: 2.18 CM
DOP CALC LVOT PEAK VEL: 1.08 M/S
DOP CALC LVOT STROKE VOLUME: 81.48 CM3
DOP CALCLVOT PEAK VEL VTI: 21.84 CM
E WAVE DECELERATION TIME: 181.6 MSEC
E/A RATIO: 1.2
E/E' RATIO: 16 M/S
ECHO LV POSTERIOR WALL: 1.19 CM (ref 0.6–1.1)
EJECTION FRACTION: 40 %
EOSINOPHIL # BLD AUTO: 0.1 K/UL (ref 0–0.5)
EOSINOPHIL NFR BLD: 1.2 % (ref 0–8)
ERYTHROCYTE [DISTWIDTH] IN BLOOD BY AUTOMATED COUNT: 15.6 % (ref 11.5–14.5)
ERYTHROCYTE [SEDIMENTATION RATE] IN BLOOD BY WESTERGREN METHOD: 16 MM/H
EST. GFR  (NO RACE VARIABLE): 12.3 ML/MIN/1.73 M^2
ESTIMATED AVG GLUCOSE: 295 MG/DL (ref 68–131)
FACT X PPP CHRO-ACNC: <0.1 IU/ML (ref 0.3–0.7)
FIO2: 44
FLOW: 6
FRACTIONAL SHORTENING: 23 % (ref 28–44)
GLUCOSE SERPL-MCNC: 447 MG/DL (ref 70–110)
HBA1C MFR BLD: 11.9 % (ref 4–5.6)
HCO3 UR-SCNC: 31.9 MMOL/L (ref 24–28)
HCT VFR BLD AUTO: 29.9 % (ref 40–54)
HDLC SERPL-MCNC: 33 MG/DL (ref 40–75)
HDLC SERPL: 38.8 % (ref 20–50)
HGB BLD-MCNC: 8.9 G/DL (ref 14–18)
IMM GRANULOCYTES # BLD AUTO: 0.02 K/UL (ref 0–0.04)
IMM GRANULOCYTES NFR BLD AUTO: 0.3 % (ref 0–0.5)
INR PPP: 1.3 (ref 0.8–1.2)
INR PPP: 1.3 (ref 0.8–1.2)
INTERVENTRICULAR SEPTUM: 1.34 CM (ref 0.6–1.1)
LA MAJOR: 6.6 CM
LA MINOR: 6.67 CM
LA WIDTH: 4.5 CM
LACTATE SERPL-SCNC: 2 MMOL/L (ref 0.5–2.2)
LDLC SERPL CALC-MCNC: 40.4 MG/DL (ref 63–159)
LEFT ATRIUM SIZE: 5.13 CM
LEFT ATRIUM VOLUME INDEX MOD: 46.6 ML/M2
LEFT ATRIUM VOLUME INDEX: 62.3 ML/M2
LEFT ATRIUM VOLUME MOD: 97.49 CM3
LEFT ATRIUM VOLUME: 130.19 CM3
LEFT INTERNAL DIMENSION IN SYSTOLE: 3.8 CM (ref 2.1–4)
LEFT VENTRICLE DIASTOLIC VOLUME INDEX: 54.19 ML/M2
LEFT VENTRICLE DIASTOLIC VOLUME: 113.25 ML
LEFT VENTRICLE MASS INDEX: 117 G/M2
LEFT VENTRICLE SYSTOLIC VOLUME INDEX: 29.6 ML/M2
LEFT VENTRICLE SYSTOLIC VOLUME: 61.86 ML
LEFT VENTRICULAR INTERNAL DIMENSION IN DIASTOLE: 4.91 CM (ref 3.5–6)
LEFT VENTRICULAR MASS: 244.76 G
LV LATERAL E/E' RATIO: 13.71 M/S
LV SEPTAL E/E' RATIO: 19.2 M/S
LYMPHOCYTES # BLD AUTO: 0.5 K/UL (ref 1–4.8)
LYMPHOCYTES NFR BLD: 8.5 % (ref 18–48)
MCH RBC QN AUTO: 27.5 PG (ref 27–31)
MCHC RBC AUTO-ENTMCNC: 29.8 G/DL (ref 32–36)
MCV RBC AUTO: 92 FL (ref 82–98)
MODE: ABNORMAL
MONOCYTES # BLD AUTO: 0.7 K/UL (ref 0.3–1)
MONOCYTES NFR BLD: 12.4 % (ref 4–15)
MRSA ID BY PCR: NEGATIVE
MV A" WAVE DURATION": 12.56 MSEC
MV PEAK A VEL: 0.8 M/S
MV PEAK E VEL: 0.96 M/S
MV STENOSIS PRESSURE HALF TIME: 52.67 MS
MV VALVE AREA P 1/2 METHOD: 4.18 CM2
NEUTROPHILS # BLD AUTO: 4.5 K/UL (ref 1.8–7.7)
NEUTROPHILS NFR BLD: 77.3 % (ref 38–73)
NONHDLC SERPL-MCNC: 52 MG/DL
NRBC BLD-RTO: 0 /100 WBC
PCO2 BLDA: 52.4 MMHG (ref 35–45)
PH SMN: 7.39 [PH] (ref 7.35–7.45)
PISA MRMAX VEL: 0.05 M/S
PISA TR MAX VEL: 3.52 M/S
PLATELET # BLD AUTO: 304 K/UL (ref 150–450)
PMV BLD AUTO: 10.5 FL (ref 9.2–12.9)
PO2 BLDA: 74 MMHG (ref 80–100)
POC BE: 7 MMOL/L
POC SATURATED O2: 94 % (ref 95–100)
POC TCO2: 33 MMOL/L (ref 23–27)
POCT GLUCOSE: 113 MG/DL (ref 70–110)
POCT GLUCOSE: 433 MG/DL (ref 70–110)
POCT GLUCOSE: 467 MG/DL (ref 70–110)
POTASSIUM SERPL-SCNC: 4.4 MMOL/L (ref 3.5–5.1)
PROCALCITONIN SERPL IA-MCNC: 1.87 NG/ML
PROT SERPL-MCNC: 6.4 G/DL (ref 6–8.4)
PROTHROMBIN TIME: 12.9 SEC (ref 9–12.5)
PROTHROMBIN TIME: 13.1 SEC (ref 9–12.5)
PULM VEIN S/D RATIO: 0.96
PV PEAK D VEL: 0.28 M/S
PV PEAK S VEL: 0.27 M/S
RA MAJOR: 5.79 CM
RA PRESSURE: 15 MMHG
RA WIDTH: 3.95 CM
RBC # BLD AUTO: 3.24 M/UL (ref 4.6–6.2)
RIGHT VENTRICULAR END-DIASTOLIC DIMENSION: 4.48 CM
SAMPLE: ABNORMAL
SINUS: 3.62 CM
SITE: ABNORMAL
SODIUM SERPL-SCNC: 133 MMOL/L (ref 136–145)
SP02: 96
STAPH AUREUS ID BY PCR: NEGATIVE
STJ: 2.57 CM
TDI LATERAL: 0.07 M/S
TDI SEPTAL: 0.05 M/S
TDI: 0.06 M/S
TR MAX PG: 50 MMHG
TRICUSPID ANNULAR PLANE SYSTOLIC EXCURSION: 1.63 CM
TRIGL SERPL-MCNC: 58 MG/DL (ref 30–150)
TROPONIN I SERPL DL<=0.01 NG/ML-MCNC: 0.03 NG/ML (ref 0–0.03)
TROPONIN I SERPL DL<=0.01 NG/ML-MCNC: 0.03 NG/ML (ref 0–0.03)
TSH SERPL DL<=0.005 MIU/L-ACNC: 0.95 UIU/ML (ref 0.4–4)
TV REST PULMONARY ARTERY PRESSURE: 65 MMHG
VANCOMYCIN SERPL-MCNC: 14.1 UG/ML
WBC # BLD AUTO: 5.79 K/UL (ref 3.9–12.7)

## 2022-10-12 PROCEDURE — 85730 THROMBOPLASTIN TIME PARTIAL: CPT

## 2022-10-12 PROCEDURE — 93010 ELECTROCARDIOGRAM REPORT: CPT | Mod: ,,, | Performed by: INTERNAL MEDICINE

## 2022-10-12 PROCEDURE — 25500020 PHARM REV CODE 255: Performed by: SURGERY

## 2022-10-12 PROCEDURE — 83880 ASSAY OF NATRIURETIC PEPTIDE: CPT

## 2022-10-12 PROCEDURE — 25000003 PHARM REV CODE 250

## 2022-10-12 PROCEDURE — 63600175 PHARM REV CODE 636 W HCPCS

## 2022-10-12 PROCEDURE — 36600 WITHDRAWAL OF ARTERIAL BLOOD: CPT

## 2022-10-12 PROCEDURE — 85379 FIBRIN DEGRADATION QUANT: CPT

## 2022-10-12 PROCEDURE — 25000003 PHARM REV CODE 250: Performed by: NURSE PRACTITIONER

## 2022-10-12 PROCEDURE — 99223 1ST HOSP IP/OBS HIGH 75: CPT | Mod: ,,, | Performed by: NURSE PRACTITIONER

## 2022-10-12 PROCEDURE — 94761 N-INVAS EAR/PLS OXIMETRY MLT: CPT

## 2022-10-12 PROCEDURE — 99223 PR INITIAL HOSPITAL CARE,LEVL III: ICD-10-PCS | Mod: ,,, | Performed by: NURSE PRACTITIONER

## 2022-10-12 PROCEDURE — 93010 EKG 12-LEAD: ICD-10-PCS | Mod: ,,, | Performed by: INTERNAL MEDICINE

## 2022-10-12 PROCEDURE — 87040 BLOOD CULTURE FOR BACTERIA: CPT | Mod: 59 | Performed by: STUDENT IN AN ORGANIZED HEALTH CARE EDUCATION/TRAINING PROGRAM

## 2022-10-12 PROCEDURE — 83036 HEMOGLOBIN GLYCOSYLATED A1C: CPT

## 2022-10-12 PROCEDURE — 94640 AIRWAY INHALATION TREATMENT: CPT

## 2022-10-12 PROCEDURE — 85520 HEPARIN ASSAY: CPT

## 2022-10-12 PROCEDURE — 85025 COMPLETE CBC W/AUTO DIFF WBC: CPT

## 2022-10-12 PROCEDURE — 27000221 HC OXYGEN, UP TO 24 HOURS

## 2022-10-12 PROCEDURE — 84484 ASSAY OF TROPONIN QUANT: CPT

## 2022-10-12 PROCEDURE — 80061 LIPID PANEL: CPT

## 2022-10-12 PROCEDURE — 99900035 HC TECH TIME PER 15 MIN (STAT)

## 2022-10-12 PROCEDURE — 20600001 HC STEP DOWN PRIVATE ROOM

## 2022-10-12 PROCEDURE — 83605 ASSAY OF LACTIC ACID: CPT | Performed by: STUDENT IN AN ORGANIZED HEALTH CARE EDUCATION/TRAINING PROGRAM

## 2022-10-12 PROCEDURE — 99233 SBSQ HOSP IP/OBS HIGH 50: CPT | Mod: ,,, | Performed by: PODIATRIST

## 2022-10-12 PROCEDURE — 36415 COLL VENOUS BLD VENIPUNCTURE: CPT | Performed by: STUDENT IN AN ORGANIZED HEALTH CARE EDUCATION/TRAINING PROGRAM

## 2022-10-12 PROCEDURE — 25000003 PHARM REV CODE 250: Performed by: STUDENT IN AN ORGANIZED HEALTH CARE EDUCATION/TRAINING PROGRAM

## 2022-10-12 PROCEDURE — 85610 PROTHROMBIN TIME: CPT | Mod: 91

## 2022-10-12 PROCEDURE — 99223 1ST HOSP IP/OBS HIGH 75: CPT | Mod: ,,, | Performed by: INTERNAL MEDICINE

## 2022-10-12 PROCEDURE — 96372 THER/PROPH/DIAG INJ SC/IM: CPT

## 2022-10-12 PROCEDURE — 84145 PROCALCITONIN (PCT): CPT

## 2022-10-12 PROCEDURE — 99233 PR SUBSEQUENT HOSPITAL CARE,LEVL III: ICD-10-PCS | Mod: ,,, | Performed by: PODIATRIST

## 2022-10-12 PROCEDURE — 84484 ASSAY OF TROPONIN QUANT: CPT | Mod: 91 | Performed by: STUDENT IN AN ORGANIZED HEALTH CARE EDUCATION/TRAINING PROGRAM

## 2022-10-12 PROCEDURE — 80048 BASIC METABOLIC PNL TOTAL CA: CPT

## 2022-10-12 PROCEDURE — 84443 ASSAY THYROID STIM HORMONE: CPT

## 2022-10-12 PROCEDURE — 80202 ASSAY OF VANCOMYCIN: CPT | Performed by: STUDENT IN AN ORGANIZED HEALTH CARE EDUCATION/TRAINING PROGRAM

## 2022-10-12 PROCEDURE — 63600175 PHARM REV CODE 636 W HCPCS: Performed by: STUDENT IN AN ORGANIZED HEALTH CARE EDUCATION/TRAINING PROGRAM

## 2022-10-12 PROCEDURE — 99223 PR INITIAL HOSPITAL CARE,LEVL III: ICD-10-PCS | Mod: ,,, | Performed by: INTERNAL MEDICINE

## 2022-10-12 PROCEDURE — 85610 PROTHROMBIN TIME: CPT

## 2022-10-12 PROCEDURE — 87040 BLOOD CULTURE FOR BACTERIA: CPT

## 2022-10-12 PROCEDURE — 25500020 PHARM REV CODE 255: Performed by: STUDENT IN AN ORGANIZED HEALTH CARE EDUCATION/TRAINING PROGRAM

## 2022-10-12 PROCEDURE — 82803 BLOOD GASES ANY COMBINATION: CPT

## 2022-10-12 PROCEDURE — 36415 COLL VENOUS BLD VENIPUNCTURE: CPT

## 2022-10-12 PROCEDURE — 80100016 HC MAINTENANCE HEMODIALYSIS

## 2022-10-12 PROCEDURE — 25000242 PHARM REV CODE 250 ALT 637 W/ HCPCS

## 2022-10-12 PROCEDURE — 93005 ELECTROCARDIOGRAM TRACING: CPT

## 2022-10-12 PROCEDURE — 80076 HEPATIC FUNCTION PANEL: CPT

## 2022-10-12 RX ORDER — FLUOXETINE HYDROCHLORIDE 20 MG/1
40 CAPSULE ORAL DAILY
Status: DISCONTINUED | OUTPATIENT
Start: 2022-10-12 | End: 2022-10-15 | Stop reason: HOSPADM

## 2022-10-12 RX ORDER — FUROSEMIDE 10 MG/ML
60 INJECTION INTRAMUSCULAR; INTRAVENOUS ONCE
Status: COMPLETED | OUTPATIENT
Start: 2022-10-12 | End: 2022-10-12

## 2022-10-12 RX ORDER — GLUCAGON 1 MG
1 KIT INJECTION
Status: DISCONTINUED | OUTPATIENT
Start: 2022-10-12 | End: 2022-10-12

## 2022-10-12 RX ORDER — DEXTROMETHORPHAN HYDROBROMIDE, GUAIFENESIN 5; 100 MG/5ML; MG/5ML
650 LIQUID ORAL EVERY 8 HOURS PRN
Status: ON HOLD | COMMUNITY
End: 2023-01-01 | Stop reason: SDUPTHER

## 2022-10-12 RX ORDER — FLUOXETINE HYDROCHLORIDE 40 MG/1
40 CAPSULE ORAL DAILY
Status: ON HOLD | COMMUNITY
Start: 2022-10-02 | End: 2023-01-01 | Stop reason: SDUPTHER

## 2022-10-12 RX ORDER — ISOSORBIDE MONONITRATE 30 MG/1
30 TABLET, EXTENDED RELEASE ORAL DAILY
Status: DISCONTINUED | OUTPATIENT
Start: 2022-10-12 | End: 2022-10-12

## 2022-10-12 RX ORDER — GLUCAGON 1 MG
1 KIT INJECTION
Status: DISCONTINUED | OUTPATIENT
Start: 2022-10-12 | End: 2022-10-15 | Stop reason: HOSPADM

## 2022-10-12 RX ORDER — INSULIN ASPART 100 [IU]/ML
10 INJECTION, SOLUTION INTRAVENOUS; SUBCUTANEOUS
Status: DISCONTINUED | OUTPATIENT
Start: 2022-10-12 | End: 2022-10-15 | Stop reason: HOSPADM

## 2022-10-12 RX ORDER — CLONIDINE HYDROCHLORIDE 0.3 MG/1
0.3 TABLET ORAL 3 TIMES DAILY
Status: ON HOLD | COMMUNITY
Start: 2022-09-28 | End: 2022-10-12 | Stop reason: CLARIF

## 2022-10-12 RX ORDER — IBUPROFEN/PSEUDOEPHEDRINE HCL 200MG-30MG
9 TABLET ORAL NIGHTLY PRN
Status: ON HOLD | COMMUNITY
End: 2023-01-01 | Stop reason: SDUPTHER

## 2022-10-12 RX ORDER — NIFEDIPINE 30 MG/1
60 TABLET, EXTENDED RELEASE ORAL DAILY
Status: DISCONTINUED | OUTPATIENT
Start: 2022-10-12 | End: 2022-10-12

## 2022-10-12 RX ORDER — FLUTICASONE PROPIONATE AND SALMETEROL 250; 50 UG/1; UG/1
1 POWDER RESPIRATORY (INHALATION) 2 TIMES DAILY
Status: ON HOLD | COMMUNITY
Start: 2022-06-07 | End: 2023-01-01 | Stop reason: SDUPTHER

## 2022-10-12 RX ORDER — HEPARIN SODIUM 5000 [USP'U]/ML
5000 INJECTION, SOLUTION INTRAVENOUS; SUBCUTANEOUS EVERY 8 HOURS
Status: DISCONTINUED | OUTPATIENT
Start: 2022-10-12 | End: 2022-10-12

## 2022-10-12 RX ORDER — ASPIRIN 81 MG/1
81 TABLET ORAL DAILY
Status: ON HOLD | COMMUNITY
Start: 2022-09-28 | End: 2023-01-01 | Stop reason: SDUPTHER

## 2022-10-12 RX ORDER — ISOSORBIDE MONONITRATE 30 MG/1
30 TABLET, EXTENDED RELEASE ORAL 2 TIMES DAILY
Status: ON HOLD | COMMUNITY
Start: 2022-10-10 | End: 2023-01-01 | Stop reason: SDUPTHER

## 2022-10-12 RX ORDER — HYDRALAZINE HYDROCHLORIDE 25 MG/1
25 TABLET, FILM COATED ORAL EVERY 8 HOURS PRN
Status: DISCONTINUED | OUTPATIENT
Start: 2022-10-12 | End: 2022-10-12

## 2022-10-12 RX ORDER — NIFEDIPINE 30 MG/1
60 TABLET, EXTENDED RELEASE ORAL DAILY
Status: DISCONTINUED | OUTPATIENT
Start: 2022-10-12 | End: 2022-10-15 | Stop reason: HOSPADM

## 2022-10-12 RX ORDER — MELATONIN 10 MG
1 CAPSULE ORAL NIGHTLY PRN
Status: ON HOLD | COMMUNITY
Start: 2022-09-28 | End: 2022-10-12 | Stop reason: CLARIF

## 2022-10-12 RX ORDER — SEVELAMER CARBONATE 800 MG/1
800 TABLET, FILM COATED ORAL
Status: DISCONTINUED | OUTPATIENT
Start: 2022-10-12 | End: 2022-10-14

## 2022-10-12 RX ORDER — LABETALOL 100 MG/1
100 TABLET, FILM COATED ORAL EVERY 12 HOURS
Status: DISCONTINUED | OUTPATIENT
Start: 2022-10-12 | End: 2022-10-12

## 2022-10-12 RX ORDER — SEVELAMER CARBONATE 800 MG/1
800 TABLET, FILM COATED ORAL 3 TIMES DAILY
Status: ON HOLD | COMMUNITY
Start: 2022-09-29 | End: 2022-10-15 | Stop reason: HOSPADM

## 2022-10-12 RX ORDER — HYDROMORPHONE HYDROCHLORIDE 1 MG/ML
1 INJECTION, SOLUTION INTRAMUSCULAR; INTRAVENOUS; SUBCUTANEOUS EVERY 6 HOURS PRN
Status: DISCONTINUED | OUTPATIENT
Start: 2022-10-12 | End: 2022-10-13

## 2022-10-12 RX ORDER — HYDRALAZINE HYDROCHLORIDE 25 MG/1
25 TABLET, FILM COATED ORAL EVERY 8 HOURS PRN
Status: DISCONTINUED | OUTPATIENT
Start: 2022-10-12 | End: 2022-10-15 | Stop reason: HOSPADM

## 2022-10-12 RX ORDER — HYDRALAZINE HYDROCHLORIDE 20 MG/ML
10 INJECTION INTRAMUSCULAR; INTRAVENOUS
Status: COMPLETED | OUTPATIENT
Start: 2022-10-12 | End: 2022-10-12

## 2022-10-12 RX ORDER — ISOSORBIDE MONONITRATE 30 MG/1
30 TABLET, EXTENDED RELEASE ORAL DAILY
Status: DISCONTINUED | OUTPATIENT
Start: 2022-10-12 | End: 2022-10-15 | Stop reason: HOSPADM

## 2022-10-12 RX ORDER — CALCIUM ACETATE 667 MG/1
667 CAPSULE ORAL 3 TIMES DAILY
Status: ON HOLD | COMMUNITY
Start: 2022-10-02 | End: 2022-01-01 | Stop reason: HOSPADM

## 2022-10-12 RX ORDER — GLUCAGON 1 MG
VIAL (EA) INJECTION
Status: ON HOLD | COMMUNITY
Start: 2022-09-02 | End: 2023-01-01 | Stop reason: HOSPADM

## 2022-10-12 RX ORDER — OLANZAPINE 2.5 MG/1
5 TABLET ORAL NIGHTLY
Status: DISCONTINUED | OUTPATIENT
Start: 2022-10-12 | End: 2022-10-12

## 2022-10-12 RX ORDER — HYDRALAZINE HYDROCHLORIDE 100 MG/1
100 TABLET, FILM COATED ORAL 3 TIMES DAILY PRN
Status: ON HOLD | COMMUNITY
Start: 2022-09-28 | End: 2022-01-01 | Stop reason: SDUPTHER

## 2022-10-12 RX ORDER — CETIRIZINE HYDROCHLORIDE 10 MG/1
10 TABLET ORAL DAILY
Status: DISCONTINUED | OUTPATIENT
Start: 2022-10-12 | End: 2022-10-15 | Stop reason: HOSPADM

## 2022-10-12 RX ORDER — HYDROXYZINE HYDROCHLORIDE 10 MG/1
10 TABLET, FILM COATED ORAL 3 TIMES DAILY PRN
Status: ON HOLD | COMMUNITY
Start: 2022-09-28 | End: 2022-10-15 | Stop reason: HOSPADM

## 2022-10-12 RX ORDER — IBUPROFEN 200 MG
16 TABLET ORAL
Status: DISCONTINUED | OUTPATIENT
Start: 2022-10-12 | End: 2022-10-15 | Stop reason: HOSPADM

## 2022-10-12 RX ORDER — CETIRIZINE HYDROCHLORIDE 10 MG/1
10 TABLET ORAL DAILY PRN
Status: ON HOLD | COMMUNITY
End: 2023-01-01 | Stop reason: SDUPTHER

## 2022-10-12 RX ORDER — IPRATROPIUM BROMIDE AND ALBUTEROL SULFATE 2.5; .5 MG/3ML; MG/3ML
3 SOLUTION RESPIRATORY (INHALATION) EVERY 4 HOURS
Status: DISCONTINUED | OUTPATIENT
Start: 2022-10-12 | End: 2022-10-15 | Stop reason: HOSPADM

## 2022-10-12 RX ORDER — INSULIN ASPART 100 [IU]/ML
7 INJECTION, SOLUTION INTRAVENOUS; SUBCUTANEOUS
Status: DISCONTINUED | OUTPATIENT
Start: 2022-10-12 | End: 2022-10-12

## 2022-10-12 RX ORDER — TRIAMCINOLONE ACETONIDE 0.25 MG/ML
LOTION TOPICAL
Status: ON HOLD | COMMUNITY
End: 2023-01-01

## 2022-10-12 RX ORDER — NIFEDIPINE 60 MG/1
60 TABLET, EXTENDED RELEASE ORAL 2 TIMES DAILY
Status: ON HOLD | COMMUNITY
Start: 2022-09-02 | End: 2022-10-12 | Stop reason: CLARIF

## 2022-10-12 RX ORDER — CETIRIZINE HYDROCHLORIDE 5 MG/1
5 TABLET ORAL DAILY
Status: DISCONTINUED | OUTPATIENT
Start: 2022-10-12 | End: 2022-10-12

## 2022-10-12 RX ORDER — IBUPROFEN 200 MG
24 TABLET ORAL
Status: DISCONTINUED | OUTPATIENT
Start: 2022-10-12 | End: 2022-10-12

## 2022-10-12 RX ORDER — ALBUTEROL SULFATE 90 UG/1
2 AEROSOL, METERED RESPIRATORY (INHALATION) 4 TIMES DAILY PRN
Status: ON HOLD | COMMUNITY
Start: 2022-09-02 | End: 2023-01-01 | Stop reason: SDUPTHER

## 2022-10-12 RX ORDER — IBUPROFEN 200 MG
24 TABLET ORAL
Status: DISCONTINUED | OUTPATIENT
Start: 2022-10-12 | End: 2022-10-15 | Stop reason: HOSPADM

## 2022-10-12 RX ORDER — LABETALOL 100 MG/1
100 TABLET, FILM COATED ORAL EVERY 12 HOURS
Status: DISCONTINUED | OUTPATIENT
Start: 2022-10-12 | End: 2022-10-13

## 2022-10-12 RX ORDER — INSULIN ASPART 100 [IU]/ML
1-10 INJECTION, SOLUTION INTRAVENOUS; SUBCUTANEOUS
Status: DISCONTINUED | OUTPATIENT
Start: 2022-10-12 | End: 2022-10-15 | Stop reason: HOSPADM

## 2022-10-12 RX ORDER — INSULIN ASPART 100 [IU]/ML
INJECTION, SOLUTION INTRAVENOUS; SUBCUTANEOUS
Status: ON HOLD | COMMUNITY
Start: 2022-09-13 | End: 2022-10-15 | Stop reason: SDUPTHER

## 2022-10-12 RX ORDER — ATORVASTATIN CALCIUM 20 MG/1
40 TABLET, FILM COATED ORAL DAILY
Status: DISCONTINUED | OUTPATIENT
Start: 2022-10-12 | End: 2022-10-15 | Stop reason: HOSPADM

## 2022-10-12 RX ORDER — OXYMETAZOLINE HCL 0.05 %
2 SPRAY, NON-AEROSOL (ML) NASAL 2 TIMES DAILY PRN
Status: ON HOLD | COMMUNITY
End: 2022-10-15 | Stop reason: HOSPADM

## 2022-10-12 RX ORDER — HEPARIN SODIUM,PORCINE/D5W 25000/250
0-40 INTRAVENOUS SOLUTION INTRAVENOUS CONTINUOUS
Status: DISPENSED | OUTPATIENT
Start: 2022-10-12 | End: 2022-10-14

## 2022-10-12 RX ORDER — CLONIDINE HYDROCHLORIDE 0.1 MG/1
0.3 TABLET ORAL 3 TIMES DAILY
Status: DISCONTINUED | OUTPATIENT
Start: 2022-10-12 | End: 2022-10-15 | Stop reason: HOSPADM

## 2022-10-12 RX ORDER — CLONIDINE HYDROCHLORIDE 0.1 MG/1
0.1 TABLET ORAL
Status: COMPLETED | OUTPATIENT
Start: 2022-10-12 | End: 2022-10-12

## 2022-10-12 RX ORDER — IBUPROFEN 200 MG
16 TABLET ORAL
Status: DISCONTINUED | OUTPATIENT
Start: 2022-10-12 | End: 2022-10-12

## 2022-10-12 RX ORDER — LISINOPRIL 40 MG/1
40 TABLET ORAL DAILY
Status: ON HOLD | COMMUNITY
Start: 2022-10-09 | End: 2022-10-15 | Stop reason: HOSPADM

## 2022-10-12 RX ORDER — OLANZAPINE 5 MG/1
5 TABLET ORAL DAILY PRN
Status: ON HOLD | COMMUNITY
Start: 2022-10-10 | End: 2022-10-15 | Stop reason: HOSPADM

## 2022-10-12 RX ORDER — HYDRALAZINE HYDROCHLORIDE 25 MG/1
50 TABLET, FILM COATED ORAL
Status: COMPLETED | OUTPATIENT
Start: 2022-10-12 | End: 2022-10-12

## 2022-10-12 RX ORDER — ROSUVASTATIN CALCIUM 20 MG/1
20 TABLET, COATED ORAL NIGHTLY
Status: ON HOLD | COMMUNITY
Start: 2022-10-02 | End: 2022-10-15 | Stop reason: HOSPADM

## 2022-10-12 RX ORDER — CEFEPIME HYDROCHLORIDE 1 G/50ML
1 INJECTION, SOLUTION INTRAVENOUS
Status: DISCONTINUED | OUTPATIENT
Start: 2022-10-12 | End: 2022-10-13

## 2022-10-12 RX ORDER — ENOXAPARIN SODIUM 100 MG/ML
30 INJECTION SUBCUTANEOUS EVERY 24 HOURS
Status: DISCONTINUED | OUTPATIENT
Start: 2022-10-12 | End: 2022-10-12

## 2022-10-12 RX ORDER — SODIUM CHLORIDE 9 MG/ML
INJECTION, SOLUTION INTRAVENOUS ONCE
Status: COMPLETED | OUTPATIENT
Start: 2022-10-12 | End: 2022-10-12

## 2022-10-12 RX ORDER — CLONIDINE 0.3 MG/24H
1 PATCH, EXTENDED RELEASE TRANSDERMAL
Status: ON HOLD | COMMUNITY
Start: 2022-09-08 | End: 2023-01-01 | Stop reason: HOSPADM

## 2022-10-12 RX ORDER — NIFEDIPINE 60 MG/1
120 TABLET, EXTENDED RELEASE ORAL NIGHTLY
Status: ON HOLD | COMMUNITY
Start: 2022-09-28 | End: 2022-01-01 | Stop reason: HOSPADM

## 2022-10-12 RX ADMIN — CLONIDINE HYDROCHLORIDE 0.3 MG: 0.2 TABLET ORAL at 08:10

## 2022-10-12 RX ADMIN — HYDRALAZINE HYDROCHLORIDE 10 MG: 20 INJECTION, SOLUTION INTRAMUSCULAR; INTRAVENOUS at 12:10

## 2022-10-12 RX ADMIN — HYDROMORPHONE HYDROCHLORIDE 1 MG: 1 INJECTION, SOLUTION INTRAMUSCULAR; INTRAVENOUS; SUBCUTANEOUS at 04:10

## 2022-10-12 RX ADMIN — IPRATROPIUM BROMIDE AND ALBUTEROL SULFATE 3 ML: 2.5; .5 SOLUTION RESPIRATORY (INHALATION) at 09:10

## 2022-10-12 RX ADMIN — LABETALOL HYDROCHLORIDE 100 MG: 100 TABLET, FILM COATED ORAL at 11:10

## 2022-10-12 RX ADMIN — CETIRIZINE HYDROCHLORIDE 10 MG: 10 TABLET, FILM COATED ORAL at 08:10

## 2022-10-12 RX ADMIN — ISOSORBIDE MONONITRATE 30 MG: 30 TABLET, EXTENDED RELEASE ORAL at 11:10

## 2022-10-12 RX ADMIN — FLUOXETINE 40 MG: 20 CAPSULE ORAL at 08:10

## 2022-10-12 RX ADMIN — SODIUM CHLORIDE: 0.9 INJECTION, SOLUTION INTRAVENOUS at 02:10

## 2022-10-12 RX ADMIN — HYDRALAZINE HYDROCHLORIDE 10 MG: 20 INJECTION INTRAMUSCULAR; INTRAVENOUS at 03:10

## 2022-10-12 RX ADMIN — DIATRIZOATE MEGLUMINE AND DIATRIZOATE SODIUM 30 ML: 660; 100 LIQUID ORAL; RECTAL at 11:10

## 2022-10-12 RX ADMIN — IPRATROPIUM BROMIDE AND ALBUTEROL SULFATE 3 ML: 2.5; .5 SOLUTION RESPIRATORY (INHALATION) at 04:10

## 2022-10-12 RX ADMIN — INSULIN DETEMIR 12 UNITS: 100 INJECTION, SOLUTION SUBCUTANEOUS at 08:10

## 2022-10-12 RX ADMIN — LABETALOL HYDROCHLORIDE 100 MG: 100 TABLET, FILM COATED ORAL at 08:10

## 2022-10-12 RX ADMIN — INSULIN ASPART 10 UNITS: 100 INJECTION, SOLUTION INTRAVENOUS; SUBCUTANEOUS at 09:10

## 2022-10-12 RX ADMIN — CEFEPIME HYDROCHLORIDE 1 G: 1 INJECTION, SOLUTION INTRAVENOUS at 12:10

## 2022-10-12 RX ADMIN — HEPARIN SODIUM 18 UNITS/KG/HR: 10000 INJECTION, SOLUTION INTRAVENOUS at 08:10

## 2022-10-12 RX ADMIN — HYDRALAZINE HYDROCHLORIDE 50 MG: 25 TABLET, FILM COATED ORAL at 02:10

## 2022-10-12 RX ADMIN — IOHEXOL 100 ML: 350 INJECTION, SOLUTION INTRAVENOUS at 01:10

## 2022-10-12 RX ADMIN — FUROSEMIDE 60 MG: 10 INJECTION, SOLUTION INTRAMUSCULAR; INTRAVENOUS at 08:10

## 2022-10-12 RX ADMIN — HYDRALAZINE HYDROCHLORIDE 10 MG: 20 INJECTION INTRAMUSCULAR; INTRAVENOUS at 02:10

## 2022-10-12 RX ADMIN — INSULIN ASPART 10 UNITS: 100 INJECTION, SOLUTION INTRAVENOUS; SUBCUTANEOUS at 11:10

## 2022-10-12 RX ADMIN — INSULIN DETEMIR 15 UNITS: 100 INJECTION, SOLUTION SUBCUTANEOUS at 08:10

## 2022-10-12 RX ADMIN — NIFEDIPINE 60 MG: 30 TABLET, FILM COATED, EXTENDED RELEASE ORAL at 08:10

## 2022-10-12 RX ADMIN — CLONIDINE HYDROCHLORIDE 0.1 MG: 0.1 TABLET ORAL at 03:10

## 2022-10-12 RX ADMIN — INSULIN ASPART 7 UNITS: 100 INJECTION, SOLUTION INTRAVENOUS; SUBCUTANEOUS at 11:10

## 2022-10-12 RX ADMIN — ATORVASTATIN CALCIUM 40 MG: 40 TABLET, FILM COATED ORAL at 08:10

## 2022-10-12 NOTE — ASSESSMENT & PLAN NOTE
Per records patient with history end-stage renal disease, on hemodialysis Monday Wednesday and Friday, per outside records last treatment was on October 10.  -continue inpatient hemodialysis  -consult nephrology

## 2022-10-12 NOTE — SUBJECTIVE & OBJECTIVE
Past Medical History:   Diagnosis Date    COPD (chronic obstructive pulmonary disease)     Diabetes mellitus type 1     ESRD on dialysis     Hypertension     SOB (shortness of breath) 10/12/2022    Patient with history COPD treated with Ellipta the albuterol, fluticasone-salmeterol tachypneic in the ED saturating 94% and 6 L on nasal cannula, patient does not know how many  he uses it is in nursing home.    -duo nebs Q 4  Given that patient is a poor historian, and in the setting of left lower extremity edema and history of coagulopathy PE cannot be ruled out.  Will workup for possible infec       Past Surgical History:   Procedure Laterality Date    AV FISTULA PLACEMENT         Review of patient's allergies indicates:  No Known Allergies    No current facility-administered medications on file prior to encounter.     No current outpatient medications on file prior to encounter.     Family History       Problem Relation (Age of Onset)    Diabetes Mother          Tobacco Use    Smoking status: Never    Smokeless tobacco: Never   Substance and Sexual Activity    Alcohol use: Never    Drug use: Not on file    Sexual activity: Not on file     Review of Systems   Constitutional:  Positive for appetite change (increased) and fatigue. Negative for chills, diaphoresis and fever.   HENT:  Positive for hearing loss and rhinorrhea (clear, allergic etiology). Negative for congestion, mouth sores, sore throat and trouble swallowing.    Respiratory:  Positive for cough and shortness of breath. Negative for apnea, choking, chest tightness and wheezing.    Cardiovascular:  Positive for leg swelling. Negative for chest pain and palpitations.   Gastrointestinal:  Positive for abdominal distention. Negative for abdominal pain, blood in stool, constipation, diarrhea, nausea, rectal pain and vomiting.   Endocrine: Negative for cold intolerance, heat intolerance, polydipsia, polyphagia and polyuria.   Genitourinary:  Negative for  difficulty urinating, flank pain, frequency and hematuria.   Musculoskeletal:  Negative for joint swelling, neck pain and neck stiffness.   Allergic/Immunologic: Negative for food allergies.   Neurological:  Negative for seizures, syncope and headaches.   Psychiatric/Behavioral:  Negative for agitation and confusion. The patient is not nervous/anxious.    Objective:     Vital Signs (Most Recent):  Temp: 97.5 °F (36.4 °C) (10/12/22 0545)  Pulse: 83 (10/12/22 0545)  Resp: 18 (10/12/22 0545)  BP: (!) 189/93 (10/12/22 0545)  SpO2: 95 % (10/12/22 0545)   Vital Signs (24h Range):  Temp:  [97.5 °F (36.4 °C)-98.5 °F (36.9 °C)] 97.5 °F (36.4 °C)  Pulse:  [75-93] 83  Resp:  [14-27] 18  SpO2:  [90 %-98 %] 95 %  BP: (165-211)/() 189/93     Weight: 93 kg (205 lb)  Body mass index is 30.27 kg/m².    Physical Exam  Constitutional:       Appearance: He is ill-appearing. He is not toxic-appearing.   HENT:      Head: Normocephalic and atraumatic.      Nose: Rhinorrhea present.      Mouth/Throat:      Mouth: Mucous membranes are moist.   Eyes:      Extraocular Movements: Extraocular movements intact.      Pupils: Pupils are equal, round, and reactive to light.   Cardiovascular:      Rate and Rhythm: Regular rhythm. Tachycardia present.      Pulses: Normal pulses.      Heart sounds: No murmur heard.    No friction rub. No gallop.   Pulmonary:      Effort: Respiratory distress present.      Breath sounds: No wheezing or rales.   Abdominal:      General: There is distension.      Palpations: There is no mass.      Tenderness: There is no abdominal tenderness. There is no guarding or rebound.      Hernia: No hernia is present.   Musculoskeletal:         General: Swelling and tenderness present.      Cervical back: Normal range of motion.      Left lower leg: Edema present.      Comments: Left tenderness to to palpation on left gastrocnemius eliciting  out of proportion pain.  Bilateral extremities the dry scaly skin with 1 point on  right foot with breaking the skin.  No neurological deficit identified   Neurological:      Mental Status: He is alert.         CRANIAL NERVES     CN III, IV, VI   Pupils are equal, round, and reactive to light.     Significant Labs: All pertinent labs within the past 24 hours have been reviewed.    Significant Imaging: I have reviewed all pertinent imaging results/findings within the past 24 hours.

## 2022-10-12 NOTE — ASSESSMENT & PLAN NOTE
Patient's FSGs are uncontrolled due to hyperglycemia on current medication regimen.  Last A1c reviewed-   Lab Results   Component Value Date    HGBA1C 11.9 (H) 10/12/2022     Most recent fingerstick glucose reviewed-   Recent Labs   Lab 10/11/22  2029 10/11/22  2149 10/12/22  0804 10/12/22  1118   POCTGLUCOSE >500* 491* 433* 467*     Current correctional scale  Medium  Increase anti-hyperglycemic dose as follows-   Antihyperglycemics (From admission, onward)    Start     Stop Route Frequency Ordered    10/12/22 2100  insulin detemir U-100 pen 15 Units         -- SubQ 2 times daily 10/12/22 1501    10/12/22 1645  insulin aspart U-100 pen 10 Units         -- SubQ 3 times daily with meals 10/12/22 1501    10/12/22 0718  insulin aspart U-100 pen 1-10 Units         -- SubQ Before meals & nightly PRN 10/12/22 0619        Hold Oral hypoglycemics while patient is in the hospital.

## 2022-10-12 NOTE — ASSESSMENT & PLAN NOTE
Patient admitted initially for suspicion of left lower extremity osteomyelitis.  Than not appreciate  evidence on left lower extremity localized swelling or warmth, laceration or signs current ulcer.  He has flank lower extremity edema, no allodynia, and pain is localized along the gastrocnemius eliciting exquisite pain to palpation, no signs crepitus or necrotic tissue or fluctuance suggestive an abscess.  Ultrasound did not show thrombus x-ray of the foot that not have pertinent findings.  -started on ceftriaxone and vancomycin  -pending further imaging  -consult Podiatry

## 2022-10-12 NOTE — PROGRESS NOTES
Pharmacokinetic Initial Assessment: IV Vancomycin    Assessment/Plan:    Patient received intravenous vancomycin in ED. Re-dose with subsequent doses when random concentrations are less than 25 mcg/mL  Desired empiric serum trough concentration is 10 to 20 mcg/mL  Draw vancomycin random level on 10/12 at 2100.  Pharmacy will continue to follow and monitor vancomycin.      Please contact pharmacy at extension [phone] with any questions regarding this assessment.     Thank you for the consult,   Tere ROJAS Goodwiny       Patient brief summary:  Cosme Lewis is a 57 y.o. male initiated on antimicrobial therapy with IV Vancomycin for treatment of suspected skin & soft tissue infection    Drug Allergies:   Review of patient's allergies indicates:  No Known Allergies    Actual Body Weight:   93 kg    Renal Function:   Estimated Creatinine Clearance: 17.6 mL/min (A) (based on SCr of 5.1 mg/dL (H)).,     Dialysis Method (if applicable):  intermittent HD    CBC (last 72 hours):  Recent Labs   Lab Result Units 10/11/22  1848 10/12/22  0435   WBC K/uL 6.51  --    Hemoglobin g/dL 8.9*  --    Hemoglobin A1C %  --  11.9*   Hematocrit % 30.9*  --    Platelets K/uL 253  --    Gran % % 72.2  --    Lymph % % 15.1*  --    Mono % % 11.2  --    Eosinophil % % 0.9  --    Basophil % % 0.3  --    Differential Method  Automated  --        Metabolic Panel (last 72 hours):  Recent Labs   Lab Result Units 10/11/22  1848 10/12/22  0435   Sodium mmol/L 131* 133*   Potassium mmol/L 4.9 4.4   Chloride mmol/L 91* 93*   CO2 mmol/L 24 27   Glucose mg/dL 484* 447*   BUN mg/dL 32* 36*   Creatinine mg/dL 4.5* 5.1*   Albumin g/dL 2.7*  --    Total Bilirubin mg/dL 0.8  --    Alkaline Phosphatase U/L 288*  --    AST U/L 37  --    ALT U/L 17  --        Drug levels (last 3 results):  No results for input(s): VANCOMYCINRA, VANCORANDOM, VANCOMYCINPE, VANCOPEAK, VANCOMYCINTR, VANCOTROUGH in the last 72 hours.    Microbiologic Results:  Microbiology Results (last 7 days)        Procedure Component Value Units Date/Time    Blood culture [[id number]] Collected: 10/12/22 0428    Order Status: Sent Specimen: Blood from Peripheral, Forearm, Right Updated: 10/12/22 0436    Culture, Respiratory with Gram Stain [[id number]]     Order Status: No result Specimen: Respiratory     Blood culture #1 **CANNOT BE ORDERED STAT** [[id number]] Collected: 10/11/22 2054    Order Status: Completed Specimen: Blood from Peripheral, Forearm, Right Updated: 10/12/22 0345     Blood Culture, Routine No Growth to date    Blood culture #2 **CANNOT BE ORDERED STAT** [[id number]] Collected: 10/11/22 2054    Order Status: Completed Specimen: Blood from Peripheral, Hand, Right Updated: 10/12/22 0345     Blood Culture, Routine No Growth to date    Blood culture [[id number]]     Order Status: Canceled Specimen: Blood

## 2022-10-12 NOTE — SUBJECTIVE & OBJECTIVE
Scheduled Meds:   sodium chloride 0.9%   Intravenous Once    albuterol-ipratropium  3 mL Nebulization Q4H    atorvastatin  40 mg Oral Daily    ceFEPime (MAXIPIME) IVPB  1 g Intravenous Q24H    cetirizine  10 mg Oral Daily    cloNIDine  0.3 mg Oral TID    FLUoxetine  40 mg Oral Daily    heparin (porcine)  5,000 Units Subcutaneous Q8H    insulin aspart U-100  7 Units Subcutaneous TIDWM    insulin detemir U-100  12 Units Subcutaneous BID    isosorbide mononitrate  30 mg Oral Daily    labetalol  100 mg Oral Q12H    NIFEdipine  60 mg Oral Daily    sevelamer carbonate  800 mg Oral TID WM     Continuous Infusions:  PRN Meds:dextrose 10%, dextrose 10%, glucagon (human recombinant), glucose, glucose, HYDROmorphone, insulin aspart U-100, Pharmacy to dose Vancomycin consult **AND** vancomycin - pharmacy to dose    Review of patient's allergies indicates:  No Known Allergies     Past Medical History:   Diagnosis Date    COPD (chronic obstructive pulmonary disease)     Diabetes mellitus type 1     ESRD on dialysis     Hypertension     SOB (shortness of breath) 10/12/2022    Patient with history COPD treated with Ellipta the albuterol, fluticasone-salmeterol tachypneic in the ED saturating 94% and 6 L on nasal cannula, patient does not know how many  he uses it is in nursing home.    -duo nebs Q 4  Given that patient is a poor historian, and in the setting of left lower extremity edema and history of coagulopathy PE cannot be ruled out.  Will workup for possible infec     Past Surgical History:   Procedure Laterality Date    AV FISTULA PLACEMENT         Family History       Problem Relation (Age of Onset)    Diabetes Mother          Tobacco Use    Smoking status: Never    Smokeless tobacco: Never   Substance and Sexual Activity    Alcohol use: Never    Drug use: Not on file    Sexual activity: Not on file     Review of Systems   Constitutional:  Negative for chills, diaphoresis and fever. Appetite change: increased.  HENT:   Positive for hearing loss. Negative for congestion, mouth sores, sore throat and trouble swallowing. Rhinorrhea: clear, allergic etiology.   Respiratory:  Positive for shortness of breath. Negative for apnea, choking, chest tightness and wheezing.    Cardiovascular:  Positive for leg swelling. Negative for chest pain and palpitations.   Gastrointestinal:  Negative for abdominal pain, blood in stool, constipation, diarrhea, nausea, rectal pain and vomiting.   Endocrine: Negative for cold intolerance, heat intolerance, polydipsia, polyphagia and polyuria.   Genitourinary:  Negative for difficulty urinating, flank pain, frequency and hematuria.   Musculoskeletal:  Negative for joint swelling, neck pain and neck stiffness.   Allergic/Immunologic: Negative for food allergies.   Neurological:  Negative for seizures, syncope and headaches.   Psychiatric/Behavioral:  Negative for agitation and confusion. The patient is not nervous/anxious.    Objective:     Vital Signs (Most Recent):  Temp: 98.4 °F (36.9 °C) (10/12/22 1116)  Pulse: 72 (10/12/22 1116)  Resp: 18 (10/12/22 1116)  BP: (!) 191/88 (10/12/22 1116)  SpO2: 96 % (10/12/22 1116)   Vital Signs (24h Range):  Temp:  [97.5 °F (36.4 °C)-98.5 °F (36.9 °C)] 98.4 °F (36.9 °C)  Pulse:  [72-93] 72  Resp:  [14-27] 18  SpO2:  [90 %-100 %] 96 %  BP: (165-211)/() 191/88     Weight: 93 kg (205 lb)  Body mass index is 30.27 kg/m².    Foot Exam    Right Foot/Ankle     Inspection and Palpation  Skin Exam: dry skin;     Neurovascular  Dorsalis pedis: 1+  Posterior tibial: 1+  Saphenous nerve sensation: diminished  Tibial nerve sensation: diminished  Superficial peroneal nerve sensation: diminished  Deep peroneal nerve sensation: diminished  Sural nerve sensation: diminished      Left Foot/Ankle      Inspection and Palpation  Skin Exam: dry skin;     Neurovascular  Dorsalis pedis: 1+  Posterior tibial: 1+  Saphenous nerve sensation: diminished  Tibial nerve sensation:  diminished  Superficial peroneal nerve sensation: diminished  Deep peroneal nerve sensation: diminished  Sural nerve sensation: diminished    Comments  Left lower extremity: Patient has swelling noted to left lower leg and foot. Patient has pain upon dorsiflexion of left foot. Patient has pain on palpation of left foot and left calf. Patient has hyperkeratosis on medial aspect of great toe. No drainage, mal odor, open wound, or drainage noted. Patient has diffuse xerosis noted.           Laboratory:  A1C:   Recent Labs   Lab 10/12/22  0435   HGBA1C 11.9*     Blood Cultures:   Recent Labs   Lab 10/11/22  2054   LABBLOO No Growth to date  No Growth to date     BMP:   Recent Labs   Lab 10/12/22  0435   *   *   K 4.4   CL 93*   CO2 27   BUN 36*   CREATININE 5.1*   CALCIUM 8.2*     CBC:   Recent Labs   Lab 10/11/22  1848   WBC 6.51   RBC 3.17*   HGB 8.9*   HCT 30.9*      MCV 98   MCH 28.1   MCHC 28.8*     CMP:   Recent Labs   Lab 10/12/22  0435 10/12/22  1033   *  --    CALCIUM 8.2*  --    ALBUMIN  --  2.5*   PROT  --  6.4   *  --    K 4.4  --    CO2 27  --    CL 93*  --    BUN 36*  --    CREATININE 5.1*  --    ALKPHOS  --  243*   ALT  --  13   AST  --  17   BILITOT  --  0.6     Coagulation:   Recent Labs   Lab 10/12/22  0435   INR 1.3*   APTT 35.2*     CRP:   Recent Labs   Lab 10/11/22  1848   CRP 73.4*     ESR:   Recent Labs   Lab 10/11/22  1848   SEDRATE 55*     Prealbumin: No results for input(s): PREALBUMIN in the last 48 hours.  Wound Cultures: No results for input(s): LABAERO in the last 4320 hours.  Microbiology Results (last 7 days)       Procedure Component Value Units Date/Time    Blood culture [[id number]] Collected: 10/12/22 0428    Order Status: Sent Specimen: Blood from Peripheral, Forearm, Right Updated: 10/12/22 0436    Culture, Respiratory with Gram Stain [[id number]]     Order Status: No result Specimen: Respiratory     Blood culture #1 **CANNOT BE ORDERED STAT**  [[id number]] Collected: 10/11/22 2054    Order Status: Completed Specimen: Blood from Peripheral, Forearm, Right Updated: 10/12/22 0345     Blood Culture, Routine No Growth to date    Blood culture #2 **CANNOT BE ORDERED STAT** [[id number]] Collected: 10/11/22 2054    Order Status: Completed Specimen: Blood from Peripheral, Hand, Right Updated: 10/12/22 0345     Blood Culture, Routine No Growth to date    Blood culture [[id number]]     Order Status: Canceled Specimen: Blood           Specimen (24h ago, onward)      None

## 2022-10-12 NOTE — ASSESSMENT & PLAN NOTE
Patient with history COPD treated with Ellipta the albuterol, fluticasone-salmeterol tachypneic in the ED saturating 94% and 6 L on nasal cannula, patient does not know how many  he uses it is in nursing home.     -duo nebs Q 4    Given that patient is a poor historian, and in the setting of left lower extremity edema and history of coagulopathy PE cannot be ruled out.  Will workup for possible infectious etiology.  Patient with magnolia exophthalmos, cannot rule out high output cardiac failure 2/2 hyperthyroidism.  Flash pulmonary edema needs to be considered in the setting of a systolic blood pressure over 210.    -US Doppler of left lower extremity    -CT chest    -D-dimer    -PT PTT INR    -troponin    -EKG    -chest x-ray   -TTE

## 2022-10-12 NOTE — PROGRESS NOTES
3.5hr HD treatment completed 2.5L of fluid removed pt tolerated well. Both needles of a UMA fistula removed and pressure held until hemostasis achieved dressing applied. Pt's diaper clean, dry and intact. Pt placed on oxygen tank for transport. . Report given to ADELITA Melgar.

## 2022-10-12 NOTE — HPI
Mr. Lewis is a 57-year-old male, poor historian, difficult to understand presents to the ED with chief complaint of left leg pain and edema.  [facility]. he states that 2 days ago he started noticing swelling of his left leg with associated out of proportion pain that did not respond to NSAIDs.  States that pain is mostly located on his gastrocnemius.  Denies fever, chills, recent trauma or lacerations.  Affirms chronic dry scaly skin.  Osteomyelitis suspected in the ED.   While taking history patient also complained shortness of breath, states he uses oxygen in the nursing home does not know how many Lts.  Denies chest pain, palpitations, right lower extremity edema, testicular edema, cough.  Affirms fatigue, dyspnea of exertion, and is currently sleeping sleeps with 3 pillows.    Denies nausea vomiting, joint pain, constipation or diarrhea, dizziness, vision problems, recent infections, weight loss and loss of appetite.    PMHX:  End-stage renal disease on hemodialysis 3 times a week MWF, last HD 10/10, COPD, HTN, dyslipidemia, insulin-dependent diabetes ( contradictory information found in outside records, unclear if type 1 or type 2), unspecified history of coagulopathy and PE, previously on oral anticoagulation which was discontinued.  Allergies:NKDA  Meds:  Patient does not know name of medications, med rec done with outside records  Surgical:  Av fistula 2 years ago per patient.  Family Hx:  Patient only knows diabetes and hypertension for her mother.  Social:  Patient lives in [facility] with his mother, works as , denies ETOH, denies recreational drug use (cocaine use in outside records) denies tobacco use.  States he eats food he is given in the nursing facility.

## 2022-10-12 NOTE — ED NOTES
Bed: [facility]  Expected date:   Expected time:   Means of arrival:   Comments:  arec

## 2022-10-12 NOTE — CONSULTS
[facility] - Observation  Podiatry  Consult Note    Patient Name: Cosme Lewis  MRN: [id number]  Admission Date: 10/11/2022  Hospital Length of Stay: 0 days  Attending Physician: Hal Streeter MD  Primary Care Provider: Primary Doctor No     Inpatient consult to Podiatry  Consult performed by: Farzad Villeda MD  Consult ordered by: Ander Jones MD  Reason for consult: left foot and lower leg swelling  Assessment/Recommendations: Patient has diffuse pain across left lower leg and foot. Lower extremity ultrasound negative for DVT. Pt evaluated with x-ray of the foot which did not demonstrate any acute process.   - No urgent surgical intervention needed from podiatry at this time  - MRI of left foot and lower leg pending  - Betadine paint applied to patient's left great toe hyperkeratosis  - Patient educated on blood glucose control and daily foot checks  - Podiatry will follow        Subjective:     History of Present Illness:   Pt is a 57 year old male with PMHx significant for ESRD(on dialysis MWF), HTN, and COPD who presents from nursing home for evaluation of acute and worsening left lower extremity swelling. Lower extremity ultrasound negative for DVT. CRP elevated. Concern for cellulitis vs osteomyelitis. Pt evaluated with x-ray of the foot which did not demonstrate any acute process. Pt noted to be hyperglycemic at greater than 500.     Podiatry was consulted for left foot osteo. Patient says for 4 days now his left lower leg and foot have been hurting him. Patient says his pain started out of no where. Patient denies any trauma to his left lower extremity. Patient rates his pain a 9/10 and describes it as a throbbing pain. Patient denies any fever chills nausea vomiting or chest pain. Patient does admit to some shortness of breath. Patient does admit to pain to his left foot and lower leg. Patient is stable at time of this visit. Patient has no other pedal complaints at this time.       Scheduled Meds:    sodium chloride 0.9%   Intravenous Once    albuterol-ipratropium  3 mL Nebulization Q4H    atorvastatin  40 mg Oral Daily    ceFEPime (MAXIPIME) IVPB  1 g Intravenous Q24H    cetirizine  10 mg Oral Daily    cloNIDine  0.3 mg Oral TID    FLUoxetine  40 mg Oral Daily    heparin (porcine)  5,000 Units Subcutaneous Q8H    insulin aspart U-100  7 Units Subcutaneous TIDWM    insulin detemir U-100  12 Units Subcutaneous BID    isosorbide mononitrate  30 mg Oral Daily    labetalol  100 mg Oral Q12H    NIFEdipine  60 mg Oral Daily    sevelamer carbonate  800 mg Oral TID WM     Continuous Infusions:  PRN Meds:dextrose 10%, dextrose 10%, glucagon (human recombinant), glucose, glucose, HYDROmorphone, insulin aspart U-100, Pharmacy to dose Vancomycin consult **AND** vancomycin - pharmacy to dose    Review of patient's allergies indicates:  No Known Allergies     Past Medical History:   Diagnosis Date    COPD (chronic obstructive pulmonary disease)     Diabetes mellitus type 1     ESRD on dialysis     Hypertension     SOB (shortness of breath) 10/12/2022    Patient with history COPD treated with Ellipta the albuterol, fluticasone-salmeterol tachypneic in the ED saturating 94% and 6 L on nasal cannula, patient does not know how many  he uses it is in nursing home.    -duo nebs Q 4  Given that patient is a poor historian, and in the setting of left lower extremity edema and history of coagulopathy PE cannot be ruled out.  Will workup for possible infec     Past Surgical History:   Procedure Laterality Date    AV FISTULA PLACEMENT         Family History       Problem Relation (Age of Onset)    Diabetes Mother          Tobacco Use    Smoking status: Never    Smokeless tobacco: Never   Substance and Sexual Activity    Alcohol use: Never    Drug use: Not on file    Sexual activity: Not on file     Review of Systems   Constitutional:  Negative for chills, diaphoresis and fever. Appetite change: increased.  HENT:   Positive for hearing loss. Negative for congestion, mouth sores, sore throat and trouble swallowing. Rhinorrhea: clear, allergic etiology.   Respiratory:  Positive for shortness of breath. Negative for apnea, choking, chest tightness and wheezing.    Cardiovascular:  Positive for leg swelling. Negative for chest pain and palpitations.   Gastrointestinal:  Negative for abdominal pain, blood in stool, constipation, diarrhea, nausea, rectal pain and vomiting.   Endocrine: Negative for cold intolerance, heat intolerance, polydipsia, polyphagia and polyuria.   Genitourinary:  Negative for difficulty urinating, flank pain, frequency and hematuria.   Musculoskeletal:  Negative for joint swelling, neck pain and neck stiffness.   Allergic/Immunologic: Negative for food allergies.   Neurological:  Negative for seizures, syncope and headaches.   Psychiatric/Behavioral:  Negative for agitation and confusion. The patient is not nervous/anxious.    Objective:     Vital Signs (Most Recent):  Temp: 98.4 °F (36.9 °C) (10/12/22 1116)  Pulse: 72 (10/12/22 1116)  Resp: 18 (10/12/22 1116)  BP: (!) 191/88 (10/12/22 1116)  SpO2: 96 % (10/12/22 1116)   Vital Signs (24h Range):  Temp:  [97.5 °F (36.4 °C)-98.5 °F (36.9 °C)] 98.4 °F (36.9 °C)  Pulse:  [72-93] 72  Resp:  [14-27] 18  SpO2:  [90 %-100 %] 96 %  BP: (165-211)/() 191/88     Weight: 93 kg (205 lb)  Body mass index is 30.27 kg/m².    Foot Exam    Right Foot/Ankle     Inspection and Palpation  Skin Exam: dry skin;     Neurovascular  Dorsalis pedis: 1+  Posterior tibial: 1+  Saphenous nerve sensation: diminished  Tibial nerve sensation: diminished  Superficial peroneal nerve sensation: diminished  Deep peroneal nerve sensation: diminished  Sural nerve sensation: diminished      Left Foot/Ankle      Inspection and Palpation  Skin Exam: dry skin;     Neurovascular  Dorsalis pedis: 1+  Posterior tibial: 1+  Saphenous nerve sensation: diminished  Tibial nerve sensation:  diminished  Superficial peroneal nerve sensation: diminished  Deep peroneal nerve sensation: diminished  Sural nerve sensation: diminished    Comments  Left lower extremity: Patient has swelling noted to left lower leg and foot. Patient has pain upon dorsiflexion of left foot. Patient has pain on palpation of left foot and left calf. Patient has hyperkeratosis on medial aspect of great toe. No drainage, mal odor, open wound, or drainage noted. Patient has diffuse xerosis noted.           Laboratory:  A1C:   Recent Labs   Lab 10/12/22  0435   HGBA1C 11.9*     Blood Cultures:   Recent Labs   Lab 10/11/22  2054   LABBLOO No Growth to date  No Growth to date     BMP:   Recent Labs   Lab 10/12/22  0435   *   *   K 4.4   CL 93*   CO2 27   BUN 36*   CREATININE 5.1*   CALCIUM 8.2*     CBC:   Recent Labs   Lab 10/11/22  1848   WBC 6.51   RBC 3.17*   HGB 8.9*   HCT 30.9*      MCV 98   MCH 28.1   MCHC 28.8*     CMP:   Recent Labs   Lab 10/12/22  0435 10/12/22  1033   *  --    CALCIUM 8.2*  --    ALBUMIN  --  2.5*   PROT  --  6.4   *  --    K 4.4  --    CO2 27  --    CL 93*  --    BUN 36*  --    CREATININE 5.1*  --    ALKPHOS  --  243*   ALT  --  13   AST  --  17   BILITOT  --  0.6     Coagulation:   Recent Labs   Lab 10/12/22  0435   INR 1.3*   APTT 35.2*     CRP:   Recent Labs   Lab 10/11/22  1848   CRP 73.4*     ESR:   Recent Labs   Lab 10/11/22  1848   SEDRATE 55*     Prealbumin: No results for input(s): PREALBUMIN in the last 48 hours.  Wound Cultures: No results for input(s): LABAERO in the last 4320 hours.  Microbiology Results (last 7 days)       Procedure Component Value Units Date/Time    Blood culture [[id number]] Collected: 10/12/22 0428    Order Status: Sent Specimen: Blood from Peripheral, Forearm, Right Updated: 10/12/22 0436    Culture, Respiratory with Gram Stain [[id number]]     Order Status: No result Specimen: Respiratory     Blood culture #1 **CANNOT BE ORDERED STAT**  [[id number]] Collected: 10/11/22 2054    Order Status: Completed Specimen: Blood from Peripheral, Forearm, Right Updated: 10/12/22 0345     Blood Culture, Routine No Growth to date    Blood culture #2 **CANNOT BE ORDERED STAT** [[id number]] Collected: 10/11/22 2054    Order Status: Completed Specimen: Blood from Peripheral, Hand, Right Updated: 10/12/22 0345     Blood Culture, Routine No Growth to date    Blood culture [[id number]]     Order Status: Canceled Specimen: Blood           Specimen (24h ago, onward)      None              Assessment/Plan:     * Cellulitis of leg, left  Patient has diffuse pain across left lower leg and foot. Lower extremity ultrasound negative for DVT. Pt evaluated with x-ray of the foot which did not demonstrate any acute process.   - No urgent surgical intervention needed from podiatry at this time  - MRI of left foot and lower leg pending  - Betadine paint applied to patient's left great toe hyperkeratosis  - Patient educated on blood glucose control and daily foot checks  - Podiatry will follow    Discharge Instructions: Patient to be scheduled for outpatient podiatry follow up withing 2 weeks of discharge. Patient okay to weight bear as tolerated in normal shoe. Patient to do daily foot checks, given patient is diabetic.                 Thank you for your consult. I will follow-up with patient. Please contact us if you have any additional questions.    Farzad Villeda DPM PGY-1  Podiatric Medicine & Surgery  [facility]  Secure Chat Preferred  Mobile: [phone]  Pager: [phone]

## 2022-10-12 NOTE — HPI
Cosme Lewis is a 57 year old male with PMHx significant for ESRD (on dialysis MWF), HTN, and COPD who presents from nursing home for evaluation of acute and worsening left lower extremity swelling, which began 3 days ago. Patient last dialyzed on 10/10/22. Patient is anuric at baseline. Denies CP, fever, cough, SOB, aches, chills, N/V/D, and abdominal pain. Nephrology was consulted for management of ESRD/HD.

## 2022-10-12 NOTE — SUBJECTIVE & OBJECTIVE
Past Medical History:   Diagnosis Date    COPD (chronic obstructive pulmonary disease)     Diabetes mellitus type 1     ESRD on dialysis     Hypertension     SOB (shortness of breath) 10/12/2022    Patient with history COPD treated with Ellipta the albuterol, fluticasone-salmeterol tachypneic in the ED saturating 94% and 6 L on nasal cannula, patient does not know how many  he uses it is in nursing home.    -duo nebs Q 4  Given that patient is a poor historian, and in the setting of left lower extremity edema and history of coagulopathy PE cannot be ruled out.  Will workup for possible infec       Past Surgical History:   Procedure Laterality Date    AV FISTULA PLACEMENT         Review of patient's allergies indicates:  No Known Allergies  Current Facility-Administered Medications   Medication Frequency    0.9%  NaCl infusion Once    albuterol-ipratropium 2.5 mg-0.5 mg/3 mL nebulizer solution 3 mL Q4H    atorvastatin tablet 40 mg Daily    cefepime in dextrose 5 % 1 gram/50 mL IVPB 1 g Q24H    cetirizine tablet 10 mg Daily    cloNIDine tablet 0.3 mg TID    dextrose 10% bolus 125 mL PRN    dextrose 10% bolus 250 mL PRN    FLUoxetine capsule 40 mg Daily    glucagon (human recombinant) injection 1 mg PRN    glucose chewable tablet 16 g PRN    glucose chewable tablet 24 g PRN    heparin (porcine) injection 5,000 Units Q8H    HYDROmorphone injection 1 mg Q6H PRN    insulin aspart U-100 pen 1-10 Units QID (AC + HS) PRN    insulin aspart U-100 pen 7 Units TIDWM    insulin detemir U-100 pen 12 Units BID    isosorbide mononitrate 24 hr tablet 30 mg Daily    labetaloL tablet 100 mg Q12H    NIFEdipine 24 hr tablet 60 mg Daily    sevelamer carbonate tablet 800 mg TID WM    vancomycin - pharmacy to dose pharmacy to manage frequency     Family History       Problem Relation (Age of Onset)    Diabetes Mother          Tobacco Use    Smoking status: Never    Smokeless tobacco: Never   Substance and Sexual Activity    Alcohol use:  Never    Drug use: Not on file    Sexual activity: Not on file     Review of Systems   Constitutional: Negative.    HENT: Negative.     Eyes: Negative.    Respiratory: Negative.     Cardiovascular:  Positive for leg swelling.   Gastrointestinal: Negative.    Genitourinary:  Positive for decreased urine volume (anuric at baseline).   Musculoskeletal: Negative.    Skin:  Positive for wound.   Neurological: Negative.    Psychiatric/Behavioral: Negative.     Objective:     Vital Signs (Most Recent):  Temp: 98.4 °F (36.9 °C) (10/12/22 1116)  Pulse: 72 (10/12/22 1116)  Resp: 18 (10/12/22 1116)  BP: (!) 191/88 (10/12/22 1116)  SpO2: 96 % (10/12/22 1116)  O2 Device (Oxygen Therapy): nasal cannula w/ humidification (10/12/22 0921)   Vital Signs (24h Range):  Temp:  [97.5 °F (36.4 °C)-98.5 °F (36.9 °C)] 98.4 °F (36.9 °C)  Pulse:  [72-93] 72  Resp:  [14-27] 18  SpO2:  [90 %-100 %] 96 %  BP: (165-211)/() 191/88     Weight: 93 kg (205 lb) (10/12/22 0550)  Body mass index is 30.27 kg/m².  Body surface area is 2.13 meters squared.    I/O last 3 completed shifts:  In: 300 [IV Piggyback:300]  Out: -     Physical Exam  Constitutional:       Appearance: Normal appearance.   HENT:      Head: Normocephalic and atraumatic.      Nose: Nose normal.      Mouth/Throat:      Mouth: Mucous membranes are moist.      Pharynx: Oropharynx is clear.   Eyes:      Conjunctiva/sclera: Conjunctivae normal.   Cardiovascular:      Rate and Rhythm: Normal rate and regular rhythm.      Comments: LUE AVF with +thrill and +bruit   Pulmonary:      Effort: Pulmonary effort is normal.      Breath sounds: Normal breath sounds.   Abdominal:      General: Abdomen is flat.      Palpations: Abdomen is soft.   Musculoskeletal:      Cervical back: Normal range of motion and neck supple.      Left lower leg: Edema present.   Skin:     General: Skin is warm and dry.      Findings: Lesion present.   Neurological:      General: No focal deficit present.      Mental  Status: He is alert and oriented to person, place, and time.   Psychiatric:         Mood and Affect: Mood normal.         Behavior: Behavior normal.       Significant Labs:  CBC:   Recent Labs   Lab 10/11/22  1848   WBC 6.51   RBC 3.17*   HGB 8.9*   HCT 30.9*      MCV 98   MCH 28.1   MCHC 28.8*     CMP:   Recent Labs   Lab 10/12/22  0435 10/12/22  1033   *  --    CALCIUM 8.2*  --    ALBUMIN  --  2.5*   PROT  --  6.4   *  --    K 4.4  --    CO2 27  --    CL 93*  --    BUN 36*  --    CREATININE 5.1*  --    ALKPHOS  --  243*   ALT  --  13   AST  --  17   BILITOT  --  0.6     All labs within the past 24 hours have been reviewed.

## 2022-10-12 NOTE — PHARMACY MED REC
"  Admission Medication History     The home medication history was taken by Christine Fabian.    You may go to "Admission" then "Reconcile Home Medications" tabs to review and/or act upon these items.     The home medication list has been updated by the Pharmacy department.   Please read ALL comments highlighted in yellow.   Please address this information as you see fit.    Feel free to contact us if you have any questions or require assistance.        Medications listed below were obtained from: Nursing home  PTA Medications   Medication Sig    acetaminophen (TYLENOL) 650 MG TbSR   Take 650 mg by mouth every 8 (eight) hours as needed (pain or fever over 100.4).    albuterol (PROVENTIL/VENTOLIN HFA) 90 mcg/actuation inhaler   Inhale 2 puffs into the lungs every 6 (six) hours as needed for Wheezing or Shortness of Breath.    aspirin (ECOTRIN) 81 MG EC tablet   Take 81 mg by mouth once daily.    calcium acetate,phosphat bind, (PHOSLO) 667 mg capsule   Take 667 mg by mouth 3 (three) times daily.    cetirizine (ZYRTEC) 10 MG tablet   Take 10 mg by mouth daily as needed (itching).    cloNIDine 0.3 mg/24 hr td ptwk (CATAPRES) 0.3 mg/24 hr   Place 1 patch onto the skin every Saturday.    FLUoxetine 40 MG capsule   Take 40 mg by mouth once daily.    fluticasone-salmeterol diskus inhaler 250-50 mcg   Inhale 1 puff into the lungs 2 (two) times daily.    GLUCAGON EMERGENCY KIT, HUMAN, 1 mg injection   1 mg into the muscle as needed if CBG < 70    hydrALAZINE (APRESOLINE) 100 MG tablet   Take 100 mg by mouth 3 (three) times daily as needed (blood pressure  greater than 190/100).    HYDROPHILIC CREAM TOP     Apply topically. Apply liberal amount to affected area twice daily for dry skin    hydrOXYzine HCL (ATARAX) 10 MG Tab   Take 10 mg by mouth 3 (three) times daily as needed (itching).    insulin aspart (NOVOLOG FLEXPEN U-100 INSULIN SUBQ)     Inject into the skin before meals and at bedtime per sliding scale: 0-60= OJ or " glucose tab; = 0; 201-250= 2; 251-300= 4; 301-350= 6; 351-400= 8; greater than 400= 10 units then call MD    insulin aspart U-100 (NOVOLOG) 100 unit/mL (3 mL) InPn pen   Inject 8 units into the skin with breakfast and lunch & 10 units with dinner    insulin detemir U-100 (LEVEMIR FLEXTOUCH) 100 unit/mL (3 mL) SubQ InPn pen   Inject 12 units into the skin every morning & 6 units at bedtime    isosorbide mononitrate (IMDUR) 30 MG 24 hr tablet   Take 30 mg by mouth 2 (two) times daily.    lisinopriL (PRINIVIL,ZESTRIL) 40 MG tablet   Take 40 mg by mouth once daily.    melatonin 3 mg TbDL   Take 9 mg by mouth nightly as needed (sleep).    NIFEdipine (ADALAT CC) 60 MG TbSR   Take 120 mg by mouth every evening.    OLANZapine (ZYPREXA) 5 MG tablet   Take 5 mg by mouth daily as needed (restlessness/ agitation).    oxymetazoline (AFRIN) 0.05 % nasal spray   2 sprays by Nasal route 2 (two) times daily as needed for Congestion.    rosuvastatin (CRESTOR) 20 MG tablet   Take 20 mg by mouth every evening.    sevelamer carbonate (RENVELA) 800 mg Tab   Take 800 mg by mouth 3 (three) times daily.    sodium chloride (OCEAN) 0.65 % nasal spray   2 sprays by Nasal route every 4 (four) hours as needed for Congestion.    tiotropium bromide (SPIRIVA RESPIMAT) 2.5 mcg/actuation inhaler   Inhale 2 puffs into the lungs Daily.    triamcinolone acetonide 0.025 % Lotn   Apply topically. Apply to the affected area twice daily    vitamin renal formula, B-complex-vitamin c-folic acid, (NEPHROCAP) 1 mg Cap Take 1 capsule by mouth once daily.       Potential issues to be addressed PRIOR TO DISCHARGE  The listed medications were obtained from another facility ([facility]). The patient may not have been able to fill these prescriptions prior to this admission and may require new scripts upon discharge.     Christine Fabian  [id number]                .

## 2022-10-12 NOTE — H&P
[facility] Medicine  History & Physical    Patient Name: Cosme Lewis  MRN: [id number]  Patient Class: IP- Inpatient  Admission Date: 10/11/2022  Attending Physician: Hal Streeter MD   Primary Care Provider: Primary Doctor No         Patient information was obtained from patient, past medical records and ER records.     Subjective:     Principal Problem:Cellulitis of leg, left    Chief Complaint:   Chief Complaint   Patient presents with    Leg Swelling     Left leg swelling primarily calf. Endorses pain throughout the entire leg. Arrives from [facility]. .         HPI: Mr. Lewis is a 57-year-old male, poor historian, difficult to understand presents to the ED with chief complaint of left leg pain and edema.  [facility] he states that 2 days ago he started noticing swelling of his left leg with associated out of proportion pain that did not respond to NSAIDs.  States that pain is mostly located on his gastrocnemius.  Denies fever, chills, recent trauma or lacerations.  Affirms chronic dry scaly skin.  Osteomyelitis suspected in the ED.   While taking history patient also complained shortness of breath, states he uses oxygen in the nursing home does not know how many Lts.  Denies chest pain, palpitations, right lower extremity edema, testicular edema, cough.  Affirms fatigue, dyspnea of exertion, and is currently sleeping sleeps with 3 pillows.    Denies nausea vomiting, joint pain, constipation or diarrhea, dizziness, vision problems, recent infections, weight loss and loss of appetite.    PMHX:  End-stage renal disease on hemodialysis 3 times a week MWF, last HD 10/10, COPD, HTN, dyslipidemia, insulin-dependent diabetes ( contradictory information found in outside records, unclear if type 1 or type 2), unspecified history of coagulopathy and PE, previously on oral anticoagulation which was discontinued.  Allergies:NKDA  Meds:  Patient does not  know name of medications, med rec done with outside records  Surgical:  Av fistula 2 years ago per patient.  Family Hx:  Patient only knows diabetes and hypertension for her mother.  Social:  Patient lives in [facility] with his mother, works as , denies ETOH, denies recreational drug use (cocaine use in outside records) denies tobacco use.  States he eats food he is given in the nursing facility.         Past Medical History:   Diagnosis Date    COPD (chronic obstructive pulmonary disease)     Diabetes mellitus type 1     ESRD on dialysis     Hypertension     SOB (shortness of breath) 10/12/2022    Patient with history COPD treated with Ellipta the albuterol, fluticasone-salmeterol tachypneic in the ED saturating 94% and 6 L on nasal cannula, patient does not know how many  he uses it is in nursing home.    -duo nebs Q 4  Given that patient is a poor historian, and in the setting of left lower extremity edema and history of coagulopathy PE cannot be ruled out.  Will workup for possible infec       Past Surgical History:   Procedure Laterality Date    AV FISTULA PLACEMENT         Review of patient's allergies indicates:  No Known Allergies    No current facility-administered medications on file prior to encounter.     No current outpatient medications on file prior to encounter.     Family History       Problem Relation (Age of Onset)    Diabetes Mother          Tobacco Use    Smoking status: Never    Smokeless tobacco: Never   Substance and Sexual Activity    Alcohol use: Never    Drug use: Not on file    Sexual activity: Not on file     Review of Systems   Constitutional:  Positive for appetite change (increased) and fatigue. Negative for chills, diaphoresis and fever.   HENT:  Positive for hearing loss and rhinorrhea (clear, allergic etiology). Negative for congestion, mouth sores, sore throat and trouble swallowing.    Respiratory:  Positive for cough and shortness of breath.  Negative for apnea, choking, chest tightness and wheezing.    Cardiovascular:  Positive for leg swelling. Negative for chest pain and palpitations.   Gastrointestinal:  Positive for abdominal distention. Negative for abdominal pain, blood in stool, constipation, diarrhea, nausea, rectal pain and vomiting.   Endocrine: Negative for cold intolerance, heat intolerance, polydipsia, polyphagia and polyuria.   Genitourinary:  Negative for difficulty urinating, flank pain, frequency and hematuria.   Musculoskeletal:  Negative for joint swelling, neck pain and neck stiffness.   Allergic/Immunologic: Negative for food allergies.   Neurological:  Negative for seizures, syncope and headaches.   Psychiatric/Behavioral:  Negative for agitation and confusion. The patient is not nervous/anxious.    Objective:     Vital Signs (Most Recent):  Temp: 97.5 °F (36.4 °C) (10/12/22 0545)  Pulse: 83 (10/12/22 0545)  Resp: 18 (10/12/22 0545)  BP: (!) 189/93 (10/12/22 0545)  SpO2: 95 % (10/12/22 0545)   Vital Signs (24h Range):  Temp:  [97.5 °F (36.4 °C)-98.5 °F (36.9 °C)] 97.5 °F (36.4 °C)  Pulse:  [75-93] 83  Resp:  [14-27] 18  SpO2:  [90 %-98 %] 95 %  BP: (165-211)/() 189/93     Weight: 93 kg (205 lb)  Body mass index is 30.27 kg/m².    Physical Exam  Constitutional:       Appearance: He is ill-appearing. He is not toxic-appearing.   HENT:      Head: Normocephalic and atraumatic.      Nose: Rhinorrhea present.      Mouth/Throat:      Mouth: Mucous membranes are moist.   Eyes:      Extraocular Movements: Extraocular movements intact.      Pupils: Pupils are equal, round, and reactive to light.   Cardiovascular:      Rate and Rhythm: Regular rhythm. Tachycardia present.      Pulses: Normal pulses.      Heart sounds: No murmur heard.    No friction rub. No gallop.   Pulmonary:      Effort: Respiratory distress present.      Breath sounds: No wheezing or rales.   Abdominal:      General: There is distension.      Palpations: There is no  mass.      Tenderness: There is no abdominal tenderness. There is no guarding or rebound.      Hernia: No hernia is present.   Musculoskeletal:         General: Swelling and tenderness present.      Cervical back: Normal range of motion.      Left lower leg: Edema present.      Comments: Left tenderness to to palpation on left gastrocnemius eliciting  out of proportion pain.  Bilateral extremities the dry scaly skin with 1 point on right foot with breaking the skin.  No neurological deficit identified   Neurological:      Mental Status: He is alert.         CRANIAL NERVES     CN III, IV, VI   Pupils are equal, round, and reactive to light.     Significant Labs: All pertinent labs within the past 24 hours have been reviewed.    Significant Imaging: I have reviewed all pertinent imaging results/findings within the past 24 hours.    Assessment/Plan:     * Cellulitis of leg, left  Patient admitted initially for suspicion of left lower extremity osteomyelitis.  Than not appreciate  evidence on left lower extremity localized swelling or warmth, laceration or signs current ulcer.  He has flank lower extremity edema, no allodynia, and pain is localized along the gastrocnemius eliciting exquisite pain to palpation, no signs crepitus or necrotic tissue or fluctuance suggestive an abscess.  Ultrasound did not show thrombus x-ray of the foot that not have pertinent findings.  -started on ceftriaxone and vancomycin  -pending further imaging  -consult Podiatry    Gram-positive bacteremia        Sepsis with acute hypoxic respiratory failure without septic shock  Patient with Hypercapnic Respiratory failure which is Acute on chronic.  he is on home oxygen at 5 LPM. Supplemental oxygen was provided saturation remained 94 %   Signs/symptoms of respiratory failure include- tachypnea, increased work of breathing, respiratory distress and use of accessory muscles. Contributing diagnoses includes - CHF, COPD, Pleural effusion, Pneumonia,  Pulmonary Embolus and Flash pulmonary edema  Labs and images were reviewed. Patient Has recent ABG, which has been reviewed. Will treat underlying causes and adjust management of respiratory failure based on pending radiologic and lab studies    COPD (chronic obstructive pulmonary disease)  -see shortness of breath      Chronic kidney disease-mineral and bone disorder        Anemia due to chronic kidney disease, on chronic dialysis  -continue EPO in dialysis      QT prolongation    -avoid  QT prolonging medications, unless benefit the risk.    Type 1 diabetes mellitus with chronic kidney disease on chronic dialysis  Patient's FSGs are uncontrolled due to hyperglycemia on current medication regimen.  Last A1c reviewed-   Lab Results   Component Value Date    HGBA1C 11.9 (H) 10/12/2022     Most recent fingerstick glucose reviewed-   Recent Labs   Lab 10/11/22  2029 10/11/22  2149 10/12/22  0804 10/12/22  1118   POCTGLUCOSE >500* 491* 433* 467*     Current correctional scale  Medium  Increase anti-hyperglycemic dose as follows-   Antihyperglycemics (From admission, onward)    Start     Stop Route Frequency Ordered    10/12/22 2100  insulin detemir U-100 pen 15 Units         -- SubQ 2 times daily 10/12/22 1501    10/12/22 1645  insulin aspart U-100 pen 10 Units         -- SubQ 3 times daily with meals 10/12/22 1501    10/12/22 0718  insulin aspart U-100 pen 1-10 Units         -- SubQ Before meals & nightly PRN 10/12/22 0619        Hold Oral hypoglycemics while patient is in the hospital.    ESRD on dialysis    Per records patient with history end-stage renal disease, on hemodialysis Monday Wednesday and Friday, per outside records last treatment was on October 10.  -continue inpatient hemodialysis  -consult nephrology    Acute on chronic respiratory failure with hypoxia  Patient with history COPD treated with Ellipta the albuterol, fluticasone-salmeterol tachypneic in the ED saturating 94% and 6 L on nasal cannula,  patient does not know how many  he uses it is in nursing home.     -duo nebs Q 4    Given that patient is a poor historian, and in the setting of left lower extremity edema and history of coagulopathy PE cannot be ruled out.  Will workup for possible infectious etiology.  Patient with magnolia exophthalmos, cannot rule out high output cardiac failure 2/2 hyperthyroidism.  Flash pulmonary edema needs to be considered in the setting of a systolic blood pressure over 210.    -US Doppler of left lower extremity    -CT chest    -D-dimer    -PT PTT INR    -troponin    -EKG    -chest x-ray   -TTE        VTE Risk Mitigation (From admission, onward)         Ordered     heparin 25,000 units in dextrose 5% (100 units/ml) IV bolus from bag - ADDITIONAL PRN BOLUS - 60 units/kg  As needed (PRN)        Question:  Heparin Infusion Adjustment (DO NOT MODIFY ANSWER)  Answer:  \\[facility]\epic\Images\Pharmacy\HeparinInfusions\heparin HIGH INTENSITY nomogram with ANTI-XA [id number].pdf    10/12/22 1440     heparin 25,000 units in dextrose 5% (100 units/ml) IV bolus from bag - ADDITIONAL PRN BOLUS - 30 units/kg  As needed (PRN)        Question:  Heparin Infusion Adjustment (DO NOT MODIFY ANSWER)  Answer:  \\[facility]\epic\Images\Pharmacy\HeparinInfusions\heparin HIGH INTENSITY nomogram with ANTI-XA [id number].pdf    10/12/22 1440     heparin 25,000 units in dextrose 5% (100 units/ml) IV bolus from bag INITIAL BOLUS  Once        Question:  Heparin Infusion Adjustment (DO NOT MODIFY ANSWER)  Answer:  \\[facility]\epic\Images\Pharmacy\HeparinInfusions\heparin HIGH INTENSITY nomogram with ANTI-XA [id number].pdf    10/12/22 1440     heparin 25,000 units in dextrose 5% 250 mL (100 units/mL) infusion HIGH INTENSITY nomogram Anti- Xa  Continuous        Question Answer Comment   Heparin Infusion Adjustment (DO NOT MODIFY ANSWER) \\[facility]\epic\Images\Pharmacy\HeparinInfusions\heparin HIGH INTENSITY nomogram with ANTI-XA [id number].pdf    Begin at (in  units/kg/hr) 18        10/12/22 1440     IP VTE HIGH RISK PATIENT  Once         10/12/22 0423                   Ander Jones MD  Department of Hospital Medicine   [facility] - [facility]ation

## 2022-10-12 NOTE — PLAN OF CARE
Received pt from Dialysis unit via bed. Pt presents AAOX4. Oriented to room and unit protocols.CM initiated, alarms set and on. NAD. Left upper arm AV fistula noted bruit auscultated, thrill palpated. Slight bloody oozing from site, pressure applied and drsg reinforced. Callbell within reach, instructed on use. Reviewed plan of care.

## 2022-10-12 NOTE — ASSESSMENT & PLAN NOTE
Outpatient HD Information:    -Outpatient HD [place] [facility]  -HD tx days: MWF   -Last HD tx prior to presentation: 10/10/22  -HD access: LUE AVF  -HD modality: iHD   -Residual urine: Anuric       Plan/Recommendations:    -HD today for metabolic clearance and volume management   -Renal diet, if not NPO   -Strict I/O's and daily weights  -Daily renal function panels   -Renally dose meds

## 2022-10-12 NOTE — PROVIDER PROGRESS NOTES - EMERGENCY DEPT.
"ED Resident HAND-OFF NOTE:  1:42 AM 10/12/2022  Cosme Lewis is a 57 y.o. male who presented to the ED on 10/12/2022 and has been managed by  and Dr. Razo, who reports patient C/O leg swelling. I assumed care of patient from off-going ED physician team at 1:42 AM pending improvement in blood pressure and admission.    On my evaluation, Cosme Lewis appears well and is hemodynamically stable. Thus far, Cosme Lewis has received:  Medications   hydrALAZINE injection 10 mg (has no administration in time range)   hydrALAZINE tablet 50 mg (has no administration in time range)   HYDROcodone-acetaminophen 5-325 mg per tablet 1 tablet (1 tablet Oral Given 10/11/22 1839)   insulin aspart U-100 pen 14 Units (14 Units Subcutaneous Given 10/11/22 2100)   cefTRIAXone (ROCEPHIN) 1 g/50 mL D5W IVPB (0 g Intravenous Stopped 10/11/22 2244)   vancomycin 1.75 g in 5 % dextrose 500 mL IVPB (0 mg Intravenous Stopped 10/12/22 0123)   hydrALAZINE injection 10 mg (10 mg Intravenous Given 10/12/22 0006)       On my exam, I appreciate:  BP (!) 182/89   Pulse 89   Temp 98.2 °F (36.8 °C) (Oral)   Resp (!) 22   Ht 5' 9" (1.753 m)   Wt 93 kg (205 lb)   SpO2 (!) 94%   BMI 30.27 kg/m²           Additional ED course:  Patient requiring multiple doses of antihypertensives.  On re-evaluation, blood pressure has improved.  Discussed with hospital medicine for admission    Disposition:  Admit  I have discussed and counseled Cosme Lewis regarding exam, results, diagnosis, treatment, and plan.  ______________________  Dereck Vergara MD   Emergency Medicine Resident  10/12/2022        "

## 2022-10-12 NOTE — ASSESSMENT & PLAN NOTE
Patient has diffuse pain across left lower leg and foot. Lower extremity ultrasound negative for DVT. Pt evaluated with x-ray of the foot which did not demonstrate any acute process.   - No urgent surgical intervention needed from podiatry at this time  - MRI of left foot and lower leg pending  - Betadine paint applied to patient's left great toe hyperkeratosis  - Patient educated on blood glucose control and daily foot checks  - Podiatry will follow    Discharge Instructions: Patient to be scheduled for outpatient podiatry follow up withing 2 weeks of discharge. Patient okay to weight bear as tolerated in normal shoe. Patient to do daily foot checks, given patient is diabetic.

## 2022-10-12 NOTE — CONSULTS
[facility]ation  Nephrology  Consult Note    Patient Name: Cosme Lewis  MRN: [id number]  Admission Date: 10/11/2022  Hospital Length of Stay: 0 days  Attending Provider: Hal Streeter MD   Primary Care Physician: Primary Doctor No  Principal Problem:Cellulitis of leg, left    Inpatient consult to Nephrology  Consult performed by: Abdulaziz Forbes NP  Consult ordered by: Ander Jones MD  Reason for consult: ESRD on HD         Subjective:     HPI: Cosme Lewis is a 57 year old male with PMHx significant for ESRD (on dialysis MWF), HTN, and COPD who presents from nursing home for evaluation of acute and worsening left lower extremity swelling, which began 3 days ago. Patient last dialyzed on 10/10/22. Patient is anuric at baseline. Denies CP, fever, cough, SOB, aches, chills, N/V/D, and abdominal pain. Nephrology was consulted for management of ESRD/HD.       Past Medical History:   Diagnosis Date    COPD (chronic obstructive pulmonary disease)     Diabetes mellitus type 1     ESRD on dialysis     Hypertension     SOB (shortness of breath) 10/12/2022    Patient with history COPD treated with Ellipta the albuterol, fluticasone-salmeterol tachypneic in the ED saturating 94% and 6 L on nasal cannula, patient does not know how many  he uses it is in nursing home.    -duo nebs Q 4  Given that patient is a poor historian, and in the setting of left lower extremity edema and history of coagulopathy PE cannot be ruled out.  Will workup for possible infec       Past Surgical History:   Procedure Laterality Date    AV FISTULA PLACEMENT         Review of patient's allergies indicates:  No Known Allergies  Current Facility-Administered Medications   Medication Frequency    0.9%  NaCl infusion Once    albuterol-ipratropium 2.5 mg-0.5 mg/3 mL nebulizer solution 3 mL Q4H    atorvastatin tablet 40 mg Daily    cefepime in dextrose 5 % 1 gram/50 mL IVPB 1 g Q24H    cetirizine tablet 10 mg Daily    cloNIDine  tablet 0.3 mg TID    dextrose 10% bolus 125 mL PRN    dextrose 10% bolus 250 mL PRN    FLUoxetine capsule 40 mg Daily    glucagon (human recombinant) injection 1 mg PRN    glucose chewable tablet 16 g PRN    glucose chewable tablet 24 g PRN    heparin (porcine) injection 5,000 Units Q8H    HYDROmorphone injection 1 mg Q6H PRN    insulin aspart U-100 pen 1-10 Units QID (AC + HS) PRN    insulin aspart U-100 pen 7 Units TIDWM    insulin detemir U-100 pen 12 Units BID    isosorbide mononitrate 24 hr tablet 30 mg Daily    labetaloL tablet 100 mg Q12H    NIFEdipine 24 hr tablet 60 mg Daily    sevelamer carbonate tablet 800 mg TID WM    vancomycin - pharmacy to dose pharmacy to manage frequency     Family History       Problem Relation (Age of Onset)    Diabetes Mother          Tobacco Use    Smoking status: Never    Smokeless tobacco: Never   Substance and Sexual Activity    Alcohol use: Never    Drug use: Not on file    Sexual activity: Not on file     Review of Systems   Constitutional: Negative.    HENT: Negative.     Eyes: Negative.    Respiratory: Negative.     Cardiovascular:  Positive for leg swelling.   Gastrointestinal: Negative.    Genitourinary:  Positive for decreased urine volume (anuric at baseline).   Musculoskeletal: Negative.    Skin:  Positive for wound.   Neurological: Negative.    Psychiatric/Behavioral: Negative.     Objective:     Vital Signs (Most Recent):  Temp: 98.4 °F (36.9 °C) (10/12/22 1116)  Pulse: 72 (10/12/22 1116)  Resp: 18 (10/12/22 1116)  BP: (!) 191/88 (10/12/22 1116)  SpO2: 96 % (10/12/22 1116)  O2 Device (Oxygen Therapy): nasal cannula w/ humidification (10/12/22 0921)   Vital Signs (24h Range):  Temp:  [97.5 °F (36.4 °C)-98.5 °F (36.9 °C)] 98.4 °F (36.9 °C)  Pulse:  [72-93] 72  Resp:  [14-27] 18  SpO2:  [90 %-100 %] 96 %  BP: (165-211)/() 191/88     Weight: 93 kg (205 lb) (10/12/22 0550)  Body mass index is 30.27 kg/m².  Body surface area is 2.13 meters  squared.    I/O last 3 completed shifts:  In: 300 [IV Piggyback:300]  Out: -     Physical Exam  Constitutional:       Appearance: Normal appearance.   HENT:      Head: Normocephalic and atraumatic.      Nose: Nose normal.      Mouth/Throat:      Mouth: Mucous membranes are moist.      Pharynx: Oropharynx is clear.   Eyes:      Conjunctiva/sclera: Conjunctivae normal.   Cardiovascular:      Rate and Rhythm: Normal rate and regular rhythm.      Comments: LUE AVF with +thrill and +bruit   Pulmonary:      Effort: Pulmonary effort is normal.      Breath sounds: Normal breath sounds.   Abdominal:      General: Abdomen is flat.      Palpations: Abdomen is soft.   Musculoskeletal:      Cervical back: Normal range of motion and neck supple.      Left lower leg: Edema present.   Skin:     General: Skin is warm and dry.      Findings: Lesion present.   Neurological:      General: No focal deficit present.      Mental Status: He is alert and oriented to person, place, and time.   Psychiatric:         Mood and Affect: Mood normal.         Behavior: Behavior normal.       Significant Labs:  CBC:   Recent Labs   Lab 10/11/22  1848   WBC 6.51   RBC 3.17*   HGB 8.9*   HCT 30.9*      MCV 98   MCH 28.1   MCHC 28.8*     CMP:   Recent Labs   Lab 10/12/22  0435 10/12/22  1033   *  --    CALCIUM 8.2*  --    ALBUMIN  --  2.5*   PROT  --  6.4   *  --    K 4.4  --    CO2 27  --    CL 93*  --    BUN 36*  --    CREATININE 5.1*  --    ALKPHOS  --  243*   ALT  --  13   AST  --  17   BILITOT  --  0.6     All labs within the past 24 hours have been reviewed.      Assessment/Plan:     * Cellulitis of leg, left  -Plan per primary team     Chronic kidney disease-mineral and bone disorder  -Low phos diet   -Continue renvela 800mg tid with meals     Anemia of chronic kidney failure, stage 5  -Transfuse for Hg <7.0  -Defer MARCIA therapy due to elevated significantly BP's     ESRD on dialysis  Outpatient HD Information:    -Outpatient  [place] [facility] [facility]  -HD tx days: MWF   -Last HD tx prior to presentation: 10/10/22  -HD access: LUE AVF  -HD modality: iHD   -Residual urine: Anuric       Plan/Recommendations:    -HD today for metabolic clearance and volume management   -Renal diet, if not NPO   -Strict I/O's and daily weights  -Daily renal function panels   -Renally dose meds          Thank you for your consult. I will follow-up with patient. Please contact us if you have any additional questions.    Abdulaziz Forbes, NP  Nephrology  [facility]

## 2022-10-12 NOTE — PROGRESS NOTES
Report received from primary nurse. Pt arrived from [place] [id number] via bed. Dialysis initiated via UMA graft with 15g needles BFR@ 400.

## 2022-10-12 NOTE — CONSULTS
"Nutrition-Related Diabetes Education      Time Spent: <5 minutes    Learners: Patient     Current HbA1c: 11.9%    Nutrition Education with handouts: "CHO Counting for People With Diabetes" handout left at bedside for pt to review. Handout includes portion sizes, tips for CHO counting, and a sample menu. Please message RD if any questions arise regarding handout provided.    Comments:  RD consulted for DM education. Pt was asleep at time of visit. Unsure intake PTA or currently. Per chart review, no wt hx. CBW- 205#. NFPE not warranted d/t appearing well-nourished. Handout left at bedside. RD will further assess at f/u. LBM 10/12.    Follow up: Yes     Please consult as needed.  Thank you!     Nikole Tijerina - Dietetic Intern   "

## 2022-10-12 NOTE — ED NOTES
Placing patient on 2L NC due to O2 sats in low 90's. Pt doesn't appear to be short of breath at this time.

## 2022-10-12 NOTE — HOSPITAL COURSE
Patient presented with complaints of left leg pain and swelling. Noted to have acute on chronic respiratory failure and started on supplemental O2 (on 2L at baseline) HTN emergency, started on home antihypertensives and PRN hydralazine. Severely hyperglycemic to 400's, started on Sub Q insulin regimen. Started on Abx out of concerns of cellulitis and osteomyelitis. No acute process on foot x-ray. Fluid overloaded with BNP > 2000. Underwent dialysis with improvement in BP and volume status. No DVT seen on LE U/S. Multiple PEs seen on CT so started on heparin drip. Found to have MSSA and Strep Viridans bacteremia. Noted to have EF of 40 on Echo (down from previous 55), no valvular vegetations. Cannot initiate GDMT given CKD on dialysis. Switched labetalol to metoprolol. ID consulted given new bacteremia. Patient continued on Vanc per ID recs. Gen surg consulted given concerns for compartment syndrome vs nec fasc on CT of leg; no evidence of either etiology per gen surg; continue abx for cellulitis. Switched to Cefazolin. Will require abx with MWF HD. Transitioned to eliquis. Outpatient cardiology and podiatry follow up. Discharge with 3-4L O2 due to inc O2 requirements from PE.

## 2022-10-12 NOTE — HPI
Pt is a 57 year old male with PMHx significant for ESRD(on dialysis MWF), HTN, and COPD who presents from nursing home for evaluation of acute and worsening left lower extremity swelling. Lower extremity ultrasound negative for DVT. CRP elevated. Concern for cellulitis vs osteomyelitis. Pt evaluated with x-ray of the foot which did not demonstrate any acute process. Pt noted to be hyperglycemic at greater than 500.     Podiatry was consulted for left foot osteo. Patient says for 4 days now his left lower leg and foot have been hurting him. Patient says his pain started out of no where. Patient denies any trauma to his left lower extremity. Patient rates his pain a 9/10 and describes it as a throbbing pain. Patient denies any fever chills nausea vomiting or chest pain. Patient does admit to some shortness of breath. Patient does admit to pain to his left foot and lower leg. Patient is stable at time of this visit. Patient has no other pedal complaints at this time.

## 2022-10-12 NOTE — ASSESSMENT & PLAN NOTE
Patient with Hypercapnic Respiratory failure which is Acute on chronic.  he is on home oxygen at 5 LPM. Supplemental oxygen was provided saturation remained 94 %   Signs/symptoms of respiratory failure include- tachypnea, increased work of breathing, respiratory distress and use of accessory muscles. Contributing diagnoses includes - CHF, COPD, Pleural effusion, Pneumonia, Pulmonary Embolus and Flash pulmonary edema  Labs and images were reviewed. Patient Has recent ABG, which has been reviewed. Will treat underlying causes and adjust management of respiratory failure based on pending radiologic and lab studies

## 2022-10-13 PROBLEM — I26.94 MULTIPLE SUBSEGMENTAL PULMONARY EMBOLI WITHOUT ACUTE COR PULMONALE: Status: ACTIVE | Noted: 2022-10-13

## 2022-10-13 PROBLEM — A41.9 SEPSIS WITH ACUTE HYPOXIC RESPIRATORY FAILURE WITHOUT SEPTIC SHOCK: Status: RESOLVED | Noted: 2022-10-12 | Resolved: 2022-10-13

## 2022-10-13 PROBLEM — M89.9 CHRONIC KIDNEY DISEASE-MINERAL AND BONE DISORDER: Status: RESOLVED | Noted: 2022-10-12 | Resolved: 2022-10-13

## 2022-10-13 PROBLEM — E83.9 CHRONIC KIDNEY DISEASE-MINERAL AND BONE DISORDER: Status: RESOLVED | Noted: 2022-10-12 | Resolved: 2022-10-13

## 2022-10-13 PROBLEM — Z99.2 ANEMIA DUE TO CHRONIC KIDNEY DISEASE, ON CHRONIC DIALYSIS: Status: RESOLVED | Noted: 2022-10-12 | Resolved: 2022-10-13

## 2022-10-13 PROBLEM — D63.1 ANEMIA DUE TO CHRONIC KIDNEY DISEASE, ON CHRONIC DIALYSIS: Status: RESOLVED | Noted: 2022-10-12 | Resolved: 2022-10-13

## 2022-10-13 PROBLEM — J44.9 COPD (CHRONIC OBSTRUCTIVE PULMONARY DISEASE): Status: RESOLVED | Noted: 2022-10-12 | Resolved: 2022-10-13

## 2022-10-13 PROBLEM — R65.20 SEPSIS WITH ACUTE HYPOXIC RESPIRATORY FAILURE WITHOUT SEPTIC SHOCK: Status: RESOLVED | Noted: 2022-10-12 | Resolved: 2022-10-13

## 2022-10-13 PROBLEM — N18.6 ANEMIA DUE TO CHRONIC KIDNEY DISEASE, ON CHRONIC DIALYSIS: Status: RESOLVED | Noted: 2022-10-12 | Resolved: 2022-10-13

## 2022-10-13 PROBLEM — J96.01 SEPSIS WITH ACUTE HYPOXIC RESPIRATORY FAILURE WITHOUT SEPTIC SHOCK: Status: RESOLVED | Noted: 2022-10-12 | Resolved: 2022-10-13

## 2022-10-13 PROBLEM — J43.8 OTHER EMPHYSEMA: Status: ACTIVE | Noted: 2022-10-12

## 2022-10-13 PROBLEM — B95.61 STAPHYLOCOCCUS AUREUS BACTEREMIA: Status: ACTIVE | Noted: 2022-10-12

## 2022-10-13 PROBLEM — N18.9 CHRONIC KIDNEY DISEASE-MINERAL AND BONE DISORDER: Status: RESOLVED | Noted: 2022-10-12 | Resolved: 2022-10-13

## 2022-10-13 LAB
ALBUMIN SERPL BCP-MCNC: 2.4 G/DL (ref 3.5–5.2)
ALP SERPL-CCNC: 215 U/L (ref 55–135)
ALT SERPL W/O P-5'-P-CCNC: 11 U/L (ref 10–44)
ANION GAP SERPL CALC-SCNC: 9 MMOL/L (ref 8–16)
AST SERPL-CCNC: 19 U/L (ref 10–40)
BASOPHILS # BLD AUTO: 0.02 K/UL (ref 0–0.2)
BASOPHILS NFR BLD: 0.4 % (ref 0–1.9)
BILIRUB SERPL-MCNC: 0.5 MG/DL (ref 0.1–1)
BUN SERPL-MCNC: 22 MG/DL (ref 6–20)
CALCIUM SERPL-MCNC: 8.1 MG/DL (ref 8.7–10.5)
CHLORIDE SERPL-SCNC: 102 MMOL/L (ref 95–110)
CK SERPL-CCNC: 134 U/L (ref 20–200)
CO2 SERPL-SCNC: 22 MMOL/L (ref 23–29)
CREAT SERPL-MCNC: 3.8 MG/DL (ref 0.5–1.4)
CRP SERPL-MCNC: 44.2 MG/L (ref 0–8.2)
DIFFERENTIAL METHOD: ABNORMAL
EOSINOPHIL # BLD AUTO: 0.1 K/UL (ref 0–0.5)
EOSINOPHIL NFR BLD: 2.6 % (ref 0–8)
ERYTHROCYTE [DISTWIDTH] IN BLOOD BY AUTOMATED COUNT: 15.5 % (ref 11.5–14.5)
EST. GFR  (NO RACE VARIABLE): 17.5 ML/MIN/1.73 M^2
FACT X PPP CHRO-ACNC: 0.17 IU/ML (ref 0.3–0.7)
FACT X PPP CHRO-ACNC: 0.3 IU/ML (ref 0.3–0.7)
FACT X PPP CHRO-ACNC: <0.1 IU/ML (ref 0.3–0.7)
GLUCOSE SERPL-MCNC: 81 MG/DL (ref 70–110)
HCT VFR BLD AUTO: 26.5 % (ref 40–54)
HGB BLD-MCNC: 8.1 G/DL (ref 14–18)
IMM GRANULOCYTES # BLD AUTO: 0.01 K/UL (ref 0–0.04)
IMM GRANULOCYTES NFR BLD AUTO: 0.2 % (ref 0–0.5)
LYMPHOCYTES # BLD AUTO: 0.6 K/UL (ref 1–4.8)
LYMPHOCYTES NFR BLD: 13.4 % (ref 18–48)
MAGNESIUM SERPL-MCNC: 1.9 MG/DL (ref 1.6–2.6)
MCH RBC QN AUTO: 27.9 PG (ref 27–31)
MCHC RBC AUTO-ENTMCNC: 30.6 G/DL (ref 32–36)
MCV RBC AUTO: 91 FL (ref 82–98)
MONOCYTES # BLD AUTO: 0.6 K/UL (ref 0.3–1)
MONOCYTES NFR BLD: 13.2 % (ref 4–15)
NEUTROPHILS # BLD AUTO: 3.2 K/UL (ref 1.8–7.7)
NEUTROPHILS NFR BLD: 70.2 % (ref 38–73)
NRBC BLD-RTO: 0 /100 WBC
PHOSPHATE SERPL-MCNC: 2.9 MG/DL (ref 2.7–4.5)
PLATELET # BLD AUTO: 242 K/UL (ref 150–450)
PMV BLD AUTO: 10.8 FL (ref 9.2–12.9)
POCT GLUCOSE: 106 MG/DL (ref 70–110)
POCT GLUCOSE: 127 MG/DL (ref 70–110)
POCT GLUCOSE: 130 MG/DL (ref 70–110)
POCT GLUCOSE: 165 MG/DL (ref 70–110)
POCT GLUCOSE: 53 MG/DL (ref 70–110)
POCT GLUCOSE: 83 MG/DL (ref 70–110)
POTASSIUM SERPL-SCNC: 4.7 MMOL/L (ref 3.5–5.1)
PROT SERPL-MCNC: 6.3 G/DL (ref 6–8.4)
RBC # BLD AUTO: 2.9 M/UL (ref 4.6–6.2)
SODIUM SERPL-SCNC: 133 MMOL/L (ref 136–145)
WBC # BLD AUTO: 4.55 K/UL (ref 3.9–12.7)

## 2022-10-13 PROCEDURE — 25000003 PHARM REV CODE 250

## 2022-10-13 PROCEDURE — 99233 SBSQ HOSP IP/OBS HIGH 50: CPT | Mod: GC,,, | Performed by: STUDENT IN AN ORGANIZED HEALTH CARE EDUCATION/TRAINING PROGRAM

## 2022-10-13 PROCEDURE — 99223 PR INITIAL HOSPITAL CARE,LEVL III: ICD-10-PCS | Mod: ,,, | Performed by: INTERNAL MEDICINE

## 2022-10-13 PROCEDURE — 25000003 PHARM REV CODE 250: Performed by: STUDENT IN AN ORGANIZED HEALTH CARE EDUCATION/TRAINING PROGRAM

## 2022-10-13 PROCEDURE — 63600175 PHARM REV CODE 636 W HCPCS

## 2022-10-13 PROCEDURE — 27000221 HC OXYGEN, UP TO 24 HOURS

## 2022-10-13 PROCEDURE — 94640 AIRWAY INHALATION TREATMENT: CPT

## 2022-10-13 PROCEDURE — 82550 ASSAY OF CK (CPK): CPT | Performed by: STUDENT IN AN ORGANIZED HEALTH CARE EDUCATION/TRAINING PROGRAM

## 2022-10-13 PROCEDURE — 85520 HEPARIN ASSAY: CPT | Mod: 91 | Performed by: STUDENT IN AN ORGANIZED HEALTH CARE EDUCATION/TRAINING PROGRAM

## 2022-10-13 PROCEDURE — 63600175 PHARM REV CODE 636 W HCPCS: Performed by: STUDENT IN AN ORGANIZED HEALTH CARE EDUCATION/TRAINING PROGRAM

## 2022-10-13 PROCEDURE — 84100 ASSAY OF PHOSPHORUS: CPT | Performed by: STUDENT IN AN ORGANIZED HEALTH CARE EDUCATION/TRAINING PROGRAM

## 2022-10-13 PROCEDURE — 99233 PR SUBSEQUENT HOSPITAL CARE,LEVL III: ICD-10-PCS | Mod: ,,, | Performed by: NURSE PRACTITIONER

## 2022-10-13 PROCEDURE — 25500020 PHARM REV CODE 255: Performed by: STUDENT IN AN ORGANIZED HEALTH CARE EDUCATION/TRAINING PROGRAM

## 2022-10-13 PROCEDURE — 83735 ASSAY OF MAGNESIUM: CPT | Performed by: STUDENT IN AN ORGANIZED HEALTH CARE EDUCATION/TRAINING PROGRAM

## 2022-10-13 PROCEDURE — 86140 C-REACTIVE PROTEIN: CPT | Performed by: STUDENT IN AN ORGANIZED HEALTH CARE EDUCATION/TRAINING PROGRAM

## 2022-10-13 PROCEDURE — 99223 1ST HOSP IP/OBS HIGH 75: CPT | Mod: ,,, | Performed by: INTERNAL MEDICINE

## 2022-10-13 PROCEDURE — 94761 N-INVAS EAR/PLS OXIMETRY MLT: CPT

## 2022-10-13 PROCEDURE — 99900035 HC TECH TIME PER 15 MIN (STAT)

## 2022-10-13 PROCEDURE — 36415 COLL VENOUS BLD VENIPUNCTURE: CPT | Performed by: STUDENT IN AN ORGANIZED HEALTH CARE EDUCATION/TRAINING PROGRAM

## 2022-10-13 PROCEDURE — 80053 COMPREHEN METABOLIC PANEL: CPT | Performed by: STUDENT IN AN ORGANIZED HEALTH CARE EDUCATION/TRAINING PROGRAM

## 2022-10-13 PROCEDURE — 99233 SBSQ HOSP IP/OBS HIGH 50: CPT | Mod: ,,, | Performed by: NURSE PRACTITIONER

## 2022-10-13 PROCEDURE — 85025 COMPLETE CBC W/AUTO DIFF WBC: CPT | Performed by: STUDENT IN AN ORGANIZED HEALTH CARE EDUCATION/TRAINING PROGRAM

## 2022-10-13 PROCEDURE — 25000242 PHARM REV CODE 250 ALT 637 W/ HCPCS

## 2022-10-13 PROCEDURE — 99233 PR SUBSEQUENT HOSPITAL CARE,LEVL III: ICD-10-PCS | Mod: GC,,, | Performed by: STUDENT IN AN ORGANIZED HEALTH CARE EDUCATION/TRAINING PROGRAM

## 2022-10-13 PROCEDURE — 36415 COLL VENOUS BLD VENIPUNCTURE: CPT

## 2022-10-13 PROCEDURE — 20600001 HC STEP DOWN PRIVATE ROOM

## 2022-10-13 PROCEDURE — 25000242 PHARM REV CODE 250 ALT 637 W/ HCPCS: Performed by: STUDENT IN AN ORGANIZED HEALTH CARE EDUCATION/TRAINING PROGRAM

## 2022-10-13 PROCEDURE — 80074 ACUTE HEPATITIS PANEL: CPT | Performed by: STUDENT IN AN ORGANIZED HEALTH CARE EDUCATION/TRAINING PROGRAM

## 2022-10-13 PROCEDURE — 85520 HEPARIN ASSAY: CPT

## 2022-10-13 RX ORDER — OXYCODONE AND ACETAMINOPHEN 5; 325 MG/1; MG/1
1 TABLET ORAL EVERY 4 HOURS PRN
Status: DISCONTINUED | OUTPATIENT
Start: 2022-10-13 | End: 2022-10-15 | Stop reason: HOSPADM

## 2022-10-13 RX ORDER — FLUTICASONE FUROATE AND VILANTEROL 100; 25 UG/1; UG/1
1 POWDER RESPIRATORY (INHALATION) DAILY
Status: DISCONTINUED | OUTPATIENT
Start: 2022-10-13 | End: 2022-10-15 | Stop reason: HOSPADM

## 2022-10-13 RX ORDER — CARVEDILOL 3.12 MG/1
3.12 TABLET ORAL 2 TIMES DAILY
Status: DISCONTINUED | OUTPATIENT
Start: 2022-10-13 | End: 2022-10-15 | Stop reason: HOSPADM

## 2022-10-13 RX ORDER — OXYCODONE HYDROCHLORIDE 5 MG/1
5 CAPSULE ORAL EVERY 6 HOURS PRN
Qty: 12 CAPSULE | Refills: 0 | Status: SHIPPED | OUTPATIENT
Start: 2022-10-13 | End: 2022-10-13 | Stop reason: HOSPADM

## 2022-10-13 RX ORDER — HYDROMORPHONE HYDROCHLORIDE 1 MG/ML
1 INJECTION, SOLUTION INTRAMUSCULAR; INTRAVENOUS; SUBCUTANEOUS ONCE
Status: COMPLETED | OUTPATIENT
Start: 2022-10-13 | End: 2022-10-13

## 2022-10-13 RX ORDER — SODIUM CHLORIDE 9 MG/ML
INJECTION, SOLUTION INTRAVENOUS ONCE
Status: DISCONTINUED | OUTPATIENT
Start: 2022-10-14 | End: 2022-10-15 | Stop reason: HOSPADM

## 2022-10-13 RX ORDER — LIDOCAINE HYDROCHLORIDE 20 MG/ML
10 INJECTION, SOLUTION EPIDURAL; INFILTRATION; INTRACAUDAL; PERINEURAL ONCE
Status: COMPLETED | OUTPATIENT
Start: 2022-10-13 | End: 2022-10-13

## 2022-10-13 RX ADMIN — LABETALOL HYDROCHLORIDE 100 MG: 100 TABLET, FILM COATED ORAL at 08:10

## 2022-10-13 RX ADMIN — IPRATROPIUM BROMIDE AND ALBUTEROL SULFATE 3 ML: 2.5; .5 SOLUTION RESPIRATORY (INHALATION) at 09:10

## 2022-10-13 RX ADMIN — IPRATROPIUM BROMIDE AND ALBUTEROL SULFATE 3 ML: 2.5; .5 SOLUTION RESPIRATORY (INHALATION) at 03:10

## 2022-10-13 RX ADMIN — SEVELAMER CARBONATE 800 MG: 800 TABLET, FILM COATED ORAL at 05:10

## 2022-10-13 RX ADMIN — SEVELAMER CARBONATE 800 MG: 800 TABLET, FILM COATED ORAL at 12:10

## 2022-10-13 RX ADMIN — SEVELAMER CARBONATE 800 MG: 800 TABLET, FILM COATED ORAL at 08:10

## 2022-10-13 RX ADMIN — VANCOMYCIN HYDROCHLORIDE 500 MG: 500 INJECTION, POWDER, LYOPHILIZED, FOR SOLUTION INTRAVENOUS at 01:10

## 2022-10-13 RX ADMIN — FLUTICASONE FUROATE AND VILANTEROL TRIFENATATE 1 PUFF: 100; 25 POWDER RESPIRATORY (INHALATION) at 11:10

## 2022-10-13 RX ADMIN — Medication 16 G: at 12:10

## 2022-10-13 RX ADMIN — HEPARIN SODIUM 24 UNITS/KG/HR: 10000 INJECTION, SOLUTION INTRAVENOUS at 07:10

## 2022-10-13 RX ADMIN — HYDROMORPHONE HYDROCHLORIDE 1 MG: 1 INJECTION, SOLUTION INTRAMUSCULAR; INTRAVENOUS; SUBCUTANEOUS at 09:10

## 2022-10-13 RX ADMIN — HEPARIN SODIUM 21 UNITS/KG/HR: 10000 INJECTION, SOLUTION INTRAVENOUS at 05:10

## 2022-10-13 RX ADMIN — IPRATROPIUM BROMIDE AND ALBUTEROL SULFATE 3 ML: 2.5; .5 SOLUTION RESPIRATORY (INHALATION) at 01:10

## 2022-10-13 RX ADMIN — CETIRIZINE HYDROCHLORIDE 10 MG: 10 TABLET, FILM COATED ORAL at 08:10

## 2022-10-13 RX ADMIN — INSULIN DETEMIR 15 UNITS: 100 INJECTION, SOLUTION SUBCUTANEOUS at 09:10

## 2022-10-13 RX ADMIN — ATORVASTATIN CALCIUM 40 MG: 40 TABLET, FILM COATED ORAL at 08:10

## 2022-10-13 RX ADMIN — HYDROMORPHONE HYDROCHLORIDE 1 MG: 1 INJECTION, SOLUTION INTRAMUSCULAR; INTRAVENOUS; SUBCUTANEOUS at 08:10

## 2022-10-13 RX ADMIN — INSULIN DETEMIR 15 UNITS: 100 INJECTION, SOLUTION SUBCUTANEOUS at 08:10

## 2022-10-13 RX ADMIN — CLONIDINE HYDROCHLORIDE 0.3 MG: 0.2 TABLET ORAL at 09:10

## 2022-10-13 RX ADMIN — IPRATROPIUM BROMIDE AND ALBUTEROL SULFATE 3 ML: 2.5; .5 SOLUTION RESPIRATORY (INHALATION) at 05:10

## 2022-10-13 RX ADMIN — NIFEDIPINE 60 MG: 30 TABLET, FILM COATED, EXTENDED RELEASE ORAL at 08:10

## 2022-10-13 RX ADMIN — CLONIDINE HYDROCHLORIDE 0.3 MG: 0.2 TABLET ORAL at 08:10

## 2022-10-13 RX ADMIN — LIDOCAINE HYDROCHLORIDE 200 MG: 20 INJECTION, SOLUTION EPIDURAL; INFILTRATION; INTRACAUDAL; PERINEURAL at 09:10

## 2022-10-13 RX ADMIN — IPRATROPIUM BROMIDE AND ALBUTEROL SULFATE 3 ML: 2.5; .5 SOLUTION RESPIRATORY (INHALATION) at 11:10

## 2022-10-13 RX ADMIN — IPRATROPIUM BROMIDE AND ALBUTEROL SULFATE 3 ML: 2.5; .5 SOLUTION RESPIRATORY (INHALATION) at 12:10

## 2022-10-13 RX ADMIN — IOHEXOL 100 ML: 350 INJECTION, SOLUTION INTRAVENOUS at 10:10

## 2022-10-13 RX ADMIN — CLONIDINE HYDROCHLORIDE 0.3 MG: 0.2 TABLET ORAL at 02:10

## 2022-10-13 RX ADMIN — CARVEDILOL 3.12 MG: 3.12 TABLET, FILM COATED ORAL at 09:10

## 2022-10-13 RX ADMIN — ISOSORBIDE MONONITRATE 30 MG: 30 TABLET, EXTENDED RELEASE ORAL at 08:10

## 2022-10-13 RX ADMIN — FLUOXETINE 40 MG: 20 CAPSULE ORAL at 08:10

## 2022-10-13 RX ADMIN — INSULIN ASPART 10 UNITS: 100 INJECTION, SOLUTION INTRAVENOUS; SUBCUTANEOUS at 08:10

## 2022-10-13 RX ADMIN — HYDROMORPHONE HYDROCHLORIDE 1 MG: 1 INJECTION, SOLUTION INTRAMUSCULAR; INTRAVENOUS; SUBCUTANEOUS at 03:10

## 2022-10-13 RX ADMIN — OXYCODONE HYDROCHLORIDE AND ACETAMINOPHEN 1 TABLET: 5; 325 TABLET ORAL at 05:10

## 2022-10-13 NOTE — SUBJECTIVE & OBJECTIVE
Interval History: NAEON. Patient seen in dialysis. Patient reports continued pain in L leg, though states this has improved. Afebrile, overall feeling better.     Review of Systems   Constitutional:  Positive for appetite change (increased) and fatigue. Negative for chills, diaphoresis and fever.   HENT:  Positive for hearing loss and rhinorrhea Negative for congestion, mouth sores, sore throat and trouble swallowing.    Respiratory:  Positive for shortness of breath. Negative for apnea, choking, chest tightness and wheezing.    Cardiovascular:  Positive for leg swelling. Negative for chest pain and palpitations.   Gastrointestinal:  Positive for abdominal distention. Negative for abdominal pain, blood in stool, constipation, diarrhea, nausea, rectal pain and vomiting.   Endocrine: Negative for cold intolerance, heat intolerance, polydipsia, polyphagia and polyuria.   Genitourinary:  Negative for difficulty urinating, flank pain, frequency and hematuria.   Musculoskeletal:  Negative for joint swelling, neck pain and neck stiffness.   Allergic/Immunologic: Negative for food allergies.   Neurological:  Negative for seizures, syncope and headaches.   Psychiatric/Behavioral:  Negative for agitation and confusion. The patient is not nervous/anxious.    Objective:     Vital Signs (Most Recent):  Temp: 97.5 °F (36.4 °C) (10/13/22 1235)  Pulse: 68 (10/13/22 1345)  Resp: (!) 23 (10/13/22 1345)  BP: (!) 170/82 (10/13/22 1128)  SpO2: 95 % (10/13/22 1345)   Vital Signs (24h Range):  Temp:  [97.5 °F (36.4 °C)-98.2 °F (36.8 °C)] 97.5 °F (36.4 °C)  Pulse:  [63-79] 68  Resp:  [12-23] 23  SpO2:  [93 %-98 %] 95 %  BP: (109-170)/(63-96) 170/82     Weight: 93 kg (205 lb)  Body mass index is 30.27 kg/m².    Intake/Output Summary (Last 24 hours) at 10/13/2022 1513  Last data filed at 10/13/2022 0022  Gross per 24 hour   Intake 476 ml   Output 3150 ml   Net -2674 ml      Physical Exam  Constitutional:       Appearance: He is not  ill-appearing or toxic-appearing.   HENT:      Head: Normocephalic and atraumatic.      Nose: Rhinorrhea present.      Mouth/Throat:      Mouth: Mucous membranes are moist.   Eyes:      Extraocular Movements: Extraocular movements intact.      Pupils: Pupils are equal, round, and reactive to light.   Cardiovascular:      Rate and Rhythm: Regular rhythm. Tachycardia present.      Pulses: Normal pulses.      Heart sounds: No murmur heard.    No friction rub. No gallop.   Pulmonary:      Effort: Respiratory distress present.      Breath sounds: No wheezing or rales.   Abdominal:      General: There is distension.      Palpations: There is no mass.      Tenderness: There is no abdominal tenderness. There is no guarding or rebound.      Hernia: No hernia is present.   Musculoskeletal:         General: Swelling and tenderness present.      Cervical back: Normal range of motion.      Left lower leg: Edema present.      Comments: LLE tenderness   Neurological:      Mental Status: He is alert.     Significant Labs: All pertinent labs within the past 24 hours have been reviewed.  CBC:   Recent Labs   Lab 10/11/22  1848 10/12/22  1907 10/13/22  0322   WBC 6.51 5.79 4.55   HGB 8.9* 8.9* 8.1*   HCT 30.9* 29.9* 26.5*    304 242     CMP:   Recent Labs   Lab 10/11/22  1848 10/12/22  0435 10/12/22  1033 10/13/22  0322   * 133*  --  133*   K 4.9 4.4  --  4.7   CL 91* 93*  --  102   CO2 24 27  --  22*   * 447*  --  81   BUN 32* 36*  --  22*   CREATININE 4.5* 5.1*  --  3.8*   CALCIUM 8.5* 8.2*  --  8.1*   PROT 7.4  --  6.4 6.3   ALBUMIN 2.7*  --  2.5* 2.4*   BILITOT 0.8  --  0.6 0.5   ALKPHOS 288*  --  243* 215*   AST 37  --  17 19   ALT 17  --  13 11   ANIONGAP 16 13  --  9       Significant Imaging: I have reviewed all pertinent imaging results/findings within the past 24 hours.

## 2022-10-13 NOTE — SUBJECTIVE & OBJECTIVE
Interval History: Tolerated HD yesterday with a net UF of 2.5L.     Objective:     Vital Signs (Most Recent):  Temp: 97.5 °F (36.4 °C) (10/13/22 0847)  Pulse: 75 (10/13/22 0913)  Resp: 16 (10/13/22 0901)  BP: (!) 166/79 (10/13/22 0847)  SpO2: (!) 94 % (10/13/22 1019)  O2 Device (Oxygen Therapy): nasal cannula w/ humidification (10/13/22 0901)   Vital Signs (24h Range):  Temp:  [97.5 °F (36.4 °C)-98.4 °F (36.9 °C)] 97.5 °F (36.4 °C)  Pulse:  [63-79] 75  Resp:  [12-23] 16  SpO2:  [93 %-98 %] 94 %  BP: (109-210)/(63-99) 166/79     Weight: 93 kg (205 lb) (10/12/22 1116)  Body mass index is 30.27 kg/m².  Body surface area is 2.13 meters squared.    I/O last 3 completed shifts:  In: 776 [P.O.:476; IV Piggyback:300]  Out: 3150 [Other:3150]    Physical Exam  Constitutional:       Appearance: Normal appearance.   HENT:      Head: Normocephalic and atraumatic.      Nose: Nose normal.      Mouth/Throat:      Mouth: Mucous membranes are moist.      Pharynx: Oropharynx is clear.   Eyes:      Conjunctiva/sclera: Conjunctivae normal.   Cardiovascular:      Rate and Rhythm: Normal rate and regular rhythm.      Comments: LUE AVF with +thrill and +bruit   Pulmonary:      Effort: Pulmonary effort is normal.      Breath sounds: Normal breath sounds.   Abdominal:      General: Abdomen is flat.      Palpations: Abdomen is soft.   Musculoskeletal:      Cervical back: Normal range of motion and neck supple.      Left lower leg: Edema present.   Skin:     General: Skin is warm and dry.      Findings: Lesion present.   Neurological:      General: No focal deficit present.      Mental Status: He is alert and oriented to person, place, and time.   Psychiatric:         Mood and Affect: Mood normal.         Behavior: Behavior normal.       Significant Labs:  CBC:   Recent Labs   Lab 10/13/22  0322   WBC 4.55   RBC 2.90*   HGB 8.1*   HCT 26.5*      MCV 91   MCH 27.9   MCHC 30.6*       CMP:   Recent Labs   Lab 10/13/22  0322   GLU 81    CALCIUM 8.1*   ALBUMIN 2.4*   PROT 6.3   *   K 4.7   CO2 22*      BUN 22*   CREATININE 3.8*   ALKPHOS 215*   ALT 11   AST 19   BILITOT 0.5       All labs within the past 24 hours have been reviewed.

## 2022-10-13 NOTE — PLAN OF CARE
[facility] - Intensive Care ([facility]  Initial Discharge Assessment       Primary Care Provider: Primary Doctor No    Admission Diagnosis: Foot pain [M79.673]  SOB (shortness of breath) [R06.02]  Leg swelling [M79.89]  Severe hypertension [I10]  Cellulitis of leg, left [L03.116]    Admission Date: 10/11/2022  Expected Discharge Date: 10/14/2022         Payor: MEDICARE / Plan: MEDICARE PART A & B / Product Type: Government /     No emergency contact information on file.            No Pharmacies Listed    Initial Assessment (most recent)       Adult Discharge Assessment - 10/13/22 1037          Discharge Assessment    Assessment Type Discharge Planning Brief Assessment (P)      Confirmed/corrected address, phone number and insurance Yes (P)      Source of Information health record (P)      Reason For Admission foot pain (P)      Facility Arrived From: [facility] (P)      Do you expect to return to your current living situation? Yes (P)      Do you have help at home or someone to help you manage your care at home? Yes (P)      Readmission within 30 days? No (P)      Patient currently being followed by outpatient case management? No (P)      How do you get to doctors appointments? car, drives self;family or friend will provide (P)      Are you on dialysis? No (P)      Do you take coumadin? No (P)                       Patient admitted to [facility] from [facility]. SW attempted to meet with patient at the bedside but patient was resting. SW utilized chart record to complete DC assessment.      Delmi Metz LMSW  [facility]   [id number]

## 2022-10-13 NOTE — SUBJECTIVE & OBJECTIVE
Past Medical History:   Diagnosis Date    COPD (chronic obstructive pulmonary disease)     Diabetes mellitus type 1     ESRD on dialysis     Hypertension     SOB (shortness of breath) 10/12/2022    Patient with history COPD treated with Ellipta the albuterol, fluticasone-salmeterol tachypneic in the ED saturating 94% and 6 L on nasal cannula, patient does not know how many  he uses it is in nursing home.    -duo nebs Q 4  Given that patient is a poor historian, and in the setting of left lower extremity edema and history of coagulopathy PE cannot be ruled out.  Will workup for possible infec       Past Surgical History:   Procedure Laterality Date    AV FISTULA PLACEMENT         Review of patient's allergies indicates:  No Known Allergies    Medications:  Medications Prior to Admission   Medication Sig    acetaminophen (TYLENOL) 650 MG TbSR Take 650 mg by mouth every 8 (eight) hours as needed (pain or fever over 100.4).    albuterol (PROVENTIL/VENTOLIN HFA) 90 mcg/actuation inhaler Inhale 2 puffs into the lungs every 6 (six) hours as needed for Wheezing or Shortness of Breath.    aspirin (ECOTRIN) 81 MG EC tablet Take 81 mg by mouth once daily.    calcium acetate,phosphat bind, (PHOSLO) 667 mg capsule Take 667 mg by mouth 3 (three) times daily.    cetirizine (ZYRTEC) 10 MG tablet Take 10 mg by mouth daily as needed (itching).    cloNIDine 0.3 mg/24 hr td ptwk (CATAPRES) 0.3 mg/24 hr Place 1 patch onto the skin every Saturday.    FLUoxetine 40 MG capsule Take 40 mg by mouth once daily.    fluticasone-salmeterol diskus inhaler 250-50 mcg Inhale 1 puff into the lungs 2 (two) times daily.    GLUCAGON EMERGENCY KIT, HUMAN, 1 mg injection 1 mg into the muscle as needed if CBG < 70    hydrALAZINE (APRESOLINE) 100 MG tablet Take 100 mg by mouth 3 (three) times daily as needed (blood pressure  greater than 190/100).    HYDROPHILIC CREAM TOP Apply topically. Apply liberal amount to affected area twice daily for dry skin     hydrOXYzine HCL (ATARAX) 10 MG Tab Take 10 mg by mouth 3 (three) times daily as needed (itching).    insulin aspart (NOVOLOG FLEXPEN U-100 INSULIN SUBQ) Inject into the skin before meals and at bedtime per sliding scale: 0-60= OJ or glucose tab; = 0; 201-250= 2; 251-300= 4; 301-350= 6; 351-400= 8; greater than 400= 10 units then call MD    insulin aspart U-100 (NOVOLOG) 100 unit/mL (3 mL) InPn pen Inject 8 units into the skin with breakfast and lunch & 10 units with dinner    isosorbide mononitrate (IMDUR) 30 MG 24 hr tablet Take 30 mg by mouth 2 (two) times daily.    lisinopriL (PRINIVIL,ZESTRIL) 40 MG tablet Take 40 mg by mouth once daily.    melatonin 3 mg TbDL Take 9 mg by mouth nightly as needed (sleep).    NIFEdipine (ADALAT CC) 60 MG TbSR Take 120 mg by mouth every evening.    OLANZapine (ZYPREXA) 5 MG tablet Take 5 mg by mouth daily as needed (restlessness/ agitation).    oxymetazoline (AFRIN) 0.05 % nasal spray 2 sprays by Nasal route 2 (two) times daily as needed for Congestion.    rosuvastatin (CRESTOR) 20 MG tablet Take 20 mg by mouth every evening.    sevelamer carbonate (RENVELA) 800 mg Tab Take 800 mg by mouth 3 (three) times daily.    sodium chloride (OCEAN) 0.65 % nasal spray 2 sprays by Nasal route every 4 (four) hours as needed for Congestion.    tiotropium bromide (SPIRIVA RESPIMAT) 2.5 mcg/actuation inhaler Inhale 2 puffs into the lungs Daily.    triamcinolone acetonide 0.025 % Lotn Apply topically. Apply to the affected area twice daily    vitamin renal formula, B-complex-vitamin c-folic acid, (NEPHROCAP) 1 mg Cap Take 1 capsule by mouth once daily.    insulin detemir U-100 (LEVEMIR FLEXTOUCH) 100 unit/mL (3 mL) SubQ InPn pen Inject 12 units into the skin every morning & 6 units at bedtime     Antibiotics (From admission, onward)      Start     Stop Route Frequency Ordered    10/12/22 1126  vancomycin - pharmacy to dose  (vancomycin IVPB)        See [facility] for full Linked Orders  Report.    -- IV pharmacy to manage frequency 10/12/22 1027          Antifungals (From admission, onward)      None          Antivirals (From admission, onward)      None               There is no immunization history on file for this patient.    Family History       Problem Relation (Age of Onset)    Diabetes Mother          Social History     Socioeconomic History    Marital status:    Tobacco Use    Smoking status: Never    Smokeless tobacco: Never   Substance and Sexual Activity    Alcohol use: Never     Review of Systems   Constitutional:  Positive for fever. Negative for appetite change, chills, diaphoresis and fatigue.   Respiratory:  Negative for cough and shortness of breath.    Cardiovascular:  Positive for leg swelling.   Gastrointestinal:  Negative for abdominal pain, diarrhea, nausea and vomiting.   Genitourinary:  Negative for dysuria.   Musculoskeletal:  Negative for back pain.   Skin:  Negative for color change, pallor, rash and wound.   Neurological:  Negative for dizziness and headaches.   All other systems reviewed and are negative.  Objective:     Vital Signs (Most Recent):  Temp: 97.5 °F (36.4 °C) (10/13/22 1235)  Pulse: 68 (10/13/22 1345)  Resp: (!) 23 (10/13/22 1345)  BP: (!) 170/82 (10/13/22 1128)  SpO2: 95 % (10/13/22 1345)   Vital Signs (24h Range):  Temp:  [97.5 °F (36.4 °C)-98.2 °F (36.8 °C)] 97.5 °F (36.4 °C)  Pulse:  [63-79] 68  Resp:  [12-23] 23  SpO2:  [93 %-98 %] 95 %  BP: (109-182)/(63-99) 170/82     Weight: 93 kg (205 lb)  Body mass index is 30.27 kg/m².    Estimated Creatinine Clearance: 23.6 mL/min (A) (based on SCr of 3.8 mg/dL (H)).    Physical Exam  Vitals and nursing note reviewed.   Constitutional:       General: He is not in acute distress.     Appearance: He is well-developed. He is not diaphoretic.   HENT:      Head: Normocephalic and atraumatic.   Eyes:      Pupils: Pupils are equal, round, and reactive to light.   Cardiovascular:      Rate and Rhythm: Normal rate  and regular rhythm.      Heart sounds: Normal heart sounds. No murmur heard.    No friction rub. No gallop.   Pulmonary:      Effort: Pulmonary effort is normal. No respiratory distress.      Breath sounds: Normal breath sounds. No wheezing or rales.   Chest:      Chest wall: No tenderness.   Abdominal:      General: Bowel sounds are normal. There is no distension.      Palpations: There is no mass.      Tenderness: There is no abdominal tenderness. There is no guarding.   Musculoskeletal:         General: Swelling and tenderness present. No deformity. Normal range of motion.      Cervical back: Normal range of motion and neck supple.      Left lower leg: Edema present.      Comments: Significant pain of LLE pan out of proportion    Skin:     General: Skin is warm and dry.      Findings: Erythema present. No rash.   Neurological:      Mental Status: He is alert and oriented to person, place, and time.   Psychiatric:         Behavior: Behavior normal.         Thought Content: Thought content normal.         Judgment: Judgment normal.       Significant Labs: All pertinent labs within the past 24 hours have been reviewed.    Significant Imaging: I have reviewed all pertinent imaging results/findings within the past 24 hours.

## 2022-10-13 NOTE — CONSULTS
[facility] ([place] [address])  Infectious Disease  Consult Note    Patient Name: Cosme Lewis  MRN: [id number]  Admission Date: 10/11/2022  Hospital Length of Stay: 1 days  Attending Physician: Hal Streeter MD  Primary Care Provider: Primary Doctor No     Isolation Status: No active isolations      Inpatient consult to Infectious Diseases  Consult performed by: Eduard Landry PA-C  Consult ordered by: Jesus Manuel Patel MD        Assessment/Plan:     Gram-positive bacteremia  Mr. Lewis is a 58 y/o male NH resident, HLD, HTN, COPD on O2, ESRD on HD presents to ED for LLE swelling and cellulitis. U/s negative for LLE DVT. CTA +pulmonary embolism and bilateral pleural effusions and ascites. Blood cultures on admit +Staph aureus and viridan strep. ID consulted for abx mgmt.     Recommendations  1. Continue IV vancomycin. Trough goal 15-20  2. D/c cefepime today. Podiatry following, no acute intervention. Agree with MRI studies to r/o abscess and/or deep soft tissue infection. Recommend general surgery evaluation as with significant pain and swelling of LLE.  Added on CRP studies today   3. U/s LLE negative for DVT. CTA +PE. ?septic emboli as with positive blood cultures?  4. Repeat blood cultures today  5. Recommend TTE     Seen and discussed with ID staff. ID will follow with you       Thank you for your consult. I will follow-up with patient. Please contact us if you have any additional questions.     Eduard Landry PA-C  Infectious Disease  [facility] ([place] [address])    Subjective:     Principal Problem: Cellulitis of left lower extremity    HPI: Mr. Lewis is a 58 y/o male NH resident, HLD, HTN, COPDESRD on HD presents to ED for LLE swelling and cellulitis. Denies having any fever chills or night sweats. Seen by podiatry, no acute intervention at this time. CTA +pulmonary embolism and bilateral pleural effusions and ascites. Blood cultures on admit +Staph aureus and viridan strep. He was ambulatory  prior to this.       Past Medical History:   Diagnosis Date    COPD (chronic obstructive pulmonary disease)     Diabetes mellitus type 1     ESRD on dialysis     Hypertension     SOB (shortness of breath) 10/12/2022    Patient with history COPD treated with Ellipta the albuterol, fluticasone-salmeterol tachypneic in the ED saturating 94% and 6 L on nasal cannula, patient does not know how many  he uses it is in nursing home.    -duo nebs Q 4  Given that patient is a poor historian, and in the setting of left lower extremity edema and history of coagulopathy PE cannot be ruled out.  Will workup for possible infec       Past Surgical History:   Procedure Laterality Date    AV FISTULA PLACEMENT         Review of patient's allergies indicates:  No Known Allergies    Medications:  Medications Prior to Admission   Medication Sig    acetaminophen (TYLENOL) 650 MG TbSR Take 650 mg by mouth every 8 (eight) hours as needed (pain or fever over 100.4).    albuterol (PROVENTIL/VENTOLIN HFA) 90 mcg/actuation inhaler Inhale 2 puffs into the lungs every 6 (six) hours as needed for Wheezing or Shortness of Breath.    aspirin (ECOTRIN) 81 MG EC tablet Take 81 mg by mouth once daily.    calcium acetate,phosphat bind, (PHOSLO) 667 mg capsule Take 667 mg by mouth 3 (three) times daily.    cetirizine (ZYRTEC) 10 MG tablet Take 10 mg by mouth daily as needed (itching).    cloNIDine 0.3 mg/24 hr td ptwk (CATAPRES) 0.3 mg/24 hr Place 1 patch onto the skin every Saturday.    FLUoxetine 40 MG capsule Take 40 mg by mouth once daily.    fluticasone-salmeterol diskus inhaler 250-50 mcg Inhale 1 puff into the lungs 2 (two) times daily.    GLUCAGON EMERGENCY KIT, HUMAN, 1 mg injection 1 mg into the muscle as needed if CBG < 70    hydrALAZINE (APRESOLINE) 100 MG tablet Take 100 mg by mouth 3 (three) times daily as needed (blood pressure  greater than 190/100).    HYDROPHILIC CREAM TOP Apply topically. Apply liberal amount to  affected area twice daily for dry skin    hydrOXYzine HCL (ATARAX) 10 MG Tab Take 10 mg by mouth 3 (three) times daily as needed (itching).    insulin aspart (NOVOLOG FLEXPEN U-100 INSULIN SUBQ) Inject into the skin before meals and at bedtime per sliding scale: 0-60= OJ or glucose tab; = 0; 201-250= 2; 251-300= 4; 301-350= 6; 351-400= 8; greater than 400= 10 units then call MD    insulin aspart U-100 (NOVOLOG) 100 unit/mL (3 mL) InPn pen Inject 8 units into the skin with breakfast and lunch & 10 units with dinner    isosorbide mononitrate (IMDUR) 30 MG 24 hr tablet Take 30 mg by mouth 2 (two) times daily.    lisinopriL (PRINIVIL,ZESTRIL) 40 MG tablet Take 40 mg by mouth once daily.    melatonin 3 mg TbDL Take 9 mg by mouth nightly as needed (sleep).    NIFEdipine (ADALAT CC) 60 MG TbSR Take 120 mg by mouth every evening.    OLANZapine (ZYPREXA) 5 MG tablet Take 5 mg by mouth daily as needed (restlessness/ agitation).    oxymetazoline (AFRIN) 0.05 % nasal spray 2 sprays by Nasal route 2 (two) times daily as needed for Congestion.    rosuvastatin (CRESTOR) 20 MG tablet Take 20 mg by mouth every evening.    sevelamer carbonate (RENVELA) 800 mg Tab Take 800 mg by mouth 3 (three) times daily.    sodium chloride (OCEAN) 0.65 % nasal spray 2 sprays by Nasal route every 4 (four) hours as needed for Congestion.    tiotropium bromide (SPIRIVA RESPIMAT) 2.5 mcg/actuation inhaler Inhale 2 puffs into the lungs Daily.    triamcinolone acetonide 0.025 % Lotn Apply topically. Apply to the affected area twice daily    vitamin renal formula, B-complex-vitamin c-folic acid, (NEPHROCAP) 1 mg Cap Take 1 capsule by mouth once daily.    insulin detemir U-100 (LEVEMIR FLEXTOUCH) 100 unit/mL (3 mL) SubQ InPn pen Inject 12 units into the skin every morning & 6 units at bedtime     Antibiotics (From admission, onward)      Start     Stop Route Frequency Ordered    10/12/22 1126  vancomycin - pharmacy to dose   (vancomycin IVPB)        [facility].    -- IV pharmacy to manage frequency 10/12/22 1027          Antifungals (From admission, onward)      None          Antivirals (From admission, onward)      None               There is no immunization history on file for this patient.    Family History       Problem Relation (Age of Onset)    Diabetes Mother          Social History     Socioeconomic History    Marital status:    Tobacco Use    Smoking status: Never    Smokeless tobacco: Never   Substance and Sexual Activity    Alcohol use: Never     Review of Systems   Constitutional:  Positive for fever. Negative for appetite change, chills, diaphoresis and fatigue.   Respiratory:  Negative for cough and shortness of breath.    Cardiovascular:  Positive for leg swelling.   Gastrointestinal:  Negative for abdominal pain, diarrhea, nausea and vomiting.   Genitourinary:  Negative for dysuria.   Musculoskeletal:  Negative for back pain.   Skin:  Negative for color change, pallor, rash and wound.   Neurological:  Negative for dizziness and headaches.   All other systems reviewed and are negative.  Objective:     Vital Signs (Most Recent):  Temp: 97.5 °F (36.4 °C) (10/13/22 1235)  Pulse: 68 (10/13/22 1345)  Resp: (!) 23 (10/13/22 1345)  BP: (!) 170/82 (10/13/22 1128)  SpO2: 95 % (10/13/22 1345)   Vital Signs (24h Range):  Temp:  [97.5 °F (36.4 °C)-98.2 °F (36.8 °C)] 97.5 °F (36.4 °C)  Pulse:  [63-79] 68  Resp:  [12-23] 23  SpO2:  [93 %-98 %] 95 %  BP: (109-182)/(63-99) 170/82     Weight: 93 kg (205 lb)  Body mass index is 30.27 kg/m².    Estimated Creatinine Clearance: 23.6 mL/min (A) (based on SCr of 3.8 mg/dL (H)).    Physical Exam  Vitals and nursing note reviewed.   Constitutional:       General: He is not in acute distress.     Appearance: He is well-developed. He is not diaphoretic.   HENT:      Head: Normocephalic and atraumatic.   Eyes:      Pupils: Pupils are equal, round, and  reactive to light.   Cardiovascular:      Rate and Rhythm: Normal rate and regular rhythm.      Heart sounds: Normal heart sounds. No murmur heard.    No friction rub. No gallop.   Pulmonary:      Effort: Pulmonary effort is normal. No respiratory distress.      Breath sounds: Normal breath sounds. No wheezing or rales.   Chest:      Chest wall: No tenderness.   Abdominal:      General: Bowel sounds are normal. There is no distension.      Palpations: There is no mass.      Tenderness: There is no abdominal tenderness. There is no guarding.   Musculoskeletal:         General: Swelling and tenderness present. No deformity. Normal range of motion.      Cervical back: Normal range of motion and neck supple.      Left lower leg: Edema present.      Comments: Significant pain of LLE pan out of proportion    Skin:     General: Skin is warm and dry.      Findings: Erythema present. No rash.   Neurological:      Mental Status: He is alert and oriented to person, place, and time.   Psychiatric:         Behavior: Behavior normal.         Thought Content: Thought content normal.         Judgment: Judgment normal.       Significant Labs: All pertinent labs within the past 24 hours have been reviewed.    Significant Imaging: I have reviewed all pertinent imaging results/findings within the past 24 hours.

## 2022-10-13 NOTE — PROGRESS NOTES
[facility] - Intensive Care ([address] Medicine  Progress Note    Patient Name: Cosme Lewis  MRN: [id number]  Patient Class: IP- Inpatient   Admission Date: 10/11/2022  Length of Stay: 1 days  Attending Physician: Hal Streeter MD  Primary Care Provider: Primary Doctor No    Subjective:     Principal Problem:Cellulitis of leg, left      HPI:  Mr. Lewis is a 57-year-old male, poor historian, difficult to understand presents to the ED with chief complaint of left leg pain and edema.  [facility]. he states that 2 days ago he started noticing swelling of his left leg with associated out of proportion pain that did not respond to NSAIDs.  States that pain is mostly located on his gastrocnemius.  Denies fever, chills, recent trauma or lacerations.  Affirms chronic dry scaly skin.  Osteomyelitis suspected in the ED.   While taking history patient also complained shortness of breath, states he uses oxygen in the nursing home does not know how many Lts.  Denies chest pain, palpitations, right lower extremity edema, testicular edema, cough.  Affirms fatigue, dyspnea of exertion, and is currently sleeping sleeps with 3 pillows.    Denies nausea vomiting, joint pain, constipation or diarrhea, dizziness, vision problems, recent infections, weight loss and loss of appetite.    PMHX:  End-stage renal disease on hemodialysis 3 times a week MWF, last HD 10/10, COPD, HTN, dyslipidemia, insulin-dependent diabetes ( contradictory information found in outside records, unclear if type 1 or type 2), unspecified history of coagulopathy and PE, previously on oral anticoagulation which was discontinued.  Allergies:NKDA  Meds:  Patient does not know name of medications, med rec done with outside records  Surgical:  Av fistula 2 years ago per patient.  Family Hx:  Patient only knows diabetes and hypertension for her mother.  Social:  Patient lives in [facility] with his  mother, works as , denies ETOH, denies recreational drug use (cocaine use in outside records) denies tobacco use.  States he eats food he is given in the nursing facility.         Overview/Hospital Course:  Patient presented with complaints of left leg pain and swelling. Noted to have acute on chronic respiratory failure and started on supplemental O2 (on 2L at baseline) HTN emergency, started on home antihypertensives and PRN hydralazine. Severely hyperglycemic to 400's, started on Sub Q insulin regimen. Started on Abx out of concerns of cellulitis and osteomyelitis. No acute process on foot x-ray. MRI pending. Fluid overloaded with BNP > 2000. Underwent dialysis with improvement in BP. No DVT seen on LE U/S. Multiple PEs seen on CT. Started on heparin drip. Found to have gram + cocci on Bcx. Noted to have EF of 40 on Echo (down from previous 55). Cannot initiate GDMT given CKD on dialysis. Switched labetalol to metoprolol.       Interval History: NAEON. Patient still endorsing LLE pain but agrees is has improved since the previous day. No other complaints at this time.     Review of Systems   Constitutional:  Positive for appetite change (increased) and fatigue. Negative for chills, diaphoresis and fever.   HENT:  Positive for hearing loss and rhinorrhea Negative for congestion, mouth sores, sore throat and trouble swallowing.    Respiratory:  Positive for shortness of breath. Negative for apnea, choking, chest tightness and wheezing.    Cardiovascular:  Positive for leg swelling. Negative for chest pain and palpitations.   Gastrointestinal:  Positive for abdominal distention. Negative for abdominal pain, blood in stool, constipation, diarrhea, nausea, rectal pain and vomiting.   Endocrine: Negative for cold intolerance, heat intolerance, polydipsia, polyphagia and polyuria.   Genitourinary:  Negative for difficulty urinating, flank pain, frequency and hematuria.   Musculoskeletal:  Negative for joint  swelling, neck pain and neck stiffness.   Allergic/Immunologic: Negative for food allergies.   Neurological:  Negative for seizures, syncope and headaches.   Psychiatric/Behavioral:  Negative for agitation and confusion. The patient is not nervous/anxious.    Objective:     Vital Signs (Most Recent):  Temp: 97.5 °F (36.4 °C) (10/13/22 1235)  Pulse: 68 (10/13/22 1345)  Resp: (!) 23 (10/13/22 1345)  BP: (!) 170/82 (10/13/22 1128)  SpO2: 95 % (10/13/22 1345)   Vital Signs (24h Range):  Temp:  [97.5 °F (36.4 °C)-98.2 °F (36.8 °C)] 97.5 °F (36.4 °C)  Pulse:  [63-79] 68  Resp:  [12-23] 23  SpO2:  [93 %-98 %] 95 %  BP: (109-170)/(63-96) 170/82     Weight: 93 kg (205 lb)  Body mass index is 30.27 kg/m².    Intake/Output Summary (Last 24 hours) at 10/13/2022 1513  Last data filed at 10/13/2022 0022  Gross per 24 hour   Intake 476 ml   Output 3150 ml   Net -2674 ml      Physical Exam  Constitutional:       Appearance: He is ill-appearing. He is not toxic-appearing.   HENT:      Head: Normocephalic and atraumatic.      Nose: Rhinorrhea present.      Mouth/Throat:      Mouth: Mucous membranes are moist.   Eyes:      Extraocular Movements: Extraocular movements intact.      Pupils: Pupils are equal, round, and reactive to light.   Cardiovascular:      Rate and Rhythm: Regular rhythm. Tachycardia present.      Pulses: Normal pulses.      Heart sounds: No murmur heard.    No friction rub. No gallop.   Pulmonary:      Effort: Respiratory distress present.      Breath sounds: No wheezing or rales.   Abdominal:      General: There is distension.      Palpations: There is no mass.      Tenderness: There is no abdominal tenderness. There is no guarding or rebound.      Hernia: No hernia is present.   Musculoskeletal:         General: Swelling and tenderness present.      Cervical back: Normal range of motion.      Left lower leg: Edema present.      Comments: LLE tenderness   Neurological:      Mental Status: He is alert.      Significant Labs: All pertinent labs within the past 24 hours have been reviewed.  CBC:   Recent Labs   Lab 10/11/22  1848 10/12/22  1907 10/13/22  0322   WBC 6.51 5.79 4.55   HGB 8.9* 8.9* 8.1*   HCT 30.9* 29.9* 26.5*    304 242     CMP:   Recent Labs   Lab 10/11/22  1848 10/12/22  0435 10/12/22  1033 10/13/22  0322   * 133*  --  133*   K 4.9 4.4  --  4.7   CL 91* 93*  --  102   CO2 24 27  --  22*   * 447*  --  81   BUN 32* 36*  --  22*   CREATININE 4.5* 5.1*  --  3.8*   CALCIUM 8.5* 8.2*  --  8.1*   PROT 7.4  --  6.4 6.3   ALBUMIN 2.7*  --  2.5* 2.4*   BILITOT 0.8  --  0.6 0.5   ALKPHOS 288*  --  243* 215*   AST 37  --  17 19   ALT 17  --  13 11   ANIONGAP 16 13  --  9       Significant Imaging: I have reviewed all pertinent imaging results/findings within the past 24 hours.      Assessment/Plan:      * Cellulitis of leg, left  Patient admitted initially for suspicion of left lower extremity cellulitis and possible osteomyelitis. LLE U/S negative for DVT. Foot XR not concerning for acute pathology. Will get MRI to further evaluate.       - Started on Vancomycin and Ceftriaxone --> Cefepime  - MRI foot pending   - Podiatry following ; appreciate recommendations      Acute on chronic respiratory failure with hypoxia  Patient with history COPD treated with Breo, albuterol. Tachypneic in the ED saturating 94% and 6L on nasal cannula. Uses 2L O2 at home. Found to have multiple PE's on chest CT. Started on heparin. Currently sating 95 on 5L    - Heparin gtt for PE  - Will transition to oral AC pending evaluation for Osteomyelitis  - Continue home inhalers  - Will need outpatient pulmonology follow up for COPD  - Possible cardiogenic contribution given acute drop in EF      ESRD on dialysis  Hemodialysis MWF    - Nephrology following     Acute HFrEF (heart failure with reduced ejection fraction)  Patient with recent echo at OSH with EF > 50%. Echo with EF on 40% during this admission. Possibly 2/2  acute illness. Will need cardiology follow up outpatient for possible evaluation.    Echo:  Severe left atrial enlargement.  The left ventricle is normal in size with concentric hypertrophy and mildly decreased systolic function.  The estimated ejection fraction is 40%.  The left ventricular global longitudinal strain is -12%.  Grade II left ventricular diastolic dysfunction.  Mild right ventricular enlargement with mildly reduced right ventricular systolic function.  Mild aortic regurgitation.  Mild mitral regurgitation.  Mild tricuspid regurgitation.  Elevated central venous pressure (15 mmHg).  There is pulmonary hypertension. The estimated PA systolic pressure is 65 mmHg.  Small circumferential pericardial effusion.  There is a left pleural effusion.  Mild right atrial enlargement.        - Outpatient Cardiology follow up    Staphylococcus aureus bacteremia  Positive staph aureus bacteremia on blood cultures    - Continue vancomycin  - Infectious disease consulted    COPD (chronic obstructive pulmonary disease)  On 2L at baseline     - Continue home Breo and albuterol   - Attempt to maintain sat at 88-92      QT prolongation    - Avoid QT prolonging medications     Type 1 diabetes mellitus with chronic kidney disease on chronic dialysis  Patient's FSGs are uncontrolled due to hyperglycemia on current medication regimen.  Last A1c reviewed-   Lab Results   Component Value Date    HGBA1C 11.9 (H) 10/12/2022     Most recent fingerstick glucose reviewed-   Recent Labs   Lab 10/12/22  2005 10/13/22  0813 10/13/22  1227 10/13/22  1341   POCTGLUCOSE 127* 106 53* 83     Current correctional scale  Medium  Increase anti-hyperglycemic dose as follows-   Antihyperglycemics (From admission, onward)      Start     Stop Route Frequency Ordered    10/12/22 2100  insulin detemir U-100 pen 15 Units         -- SubQ 2 times daily 10/12/22 1501    10/12/22 1645  insulin aspart U-100 pen 10 Units         -- SubQ 3 times daily with  meals 10/12/22 1501    10/12/22 0718  insulin aspart U-100 pen 1-10 Units         -- SubQ Before meals & nightly PRN 10/12/22 0619          Hold Oral hypoglycemics while patient is in the hospital.      VTE Risk Mitigation (From admission, onward)           Ordered     heparin 25,000 units in dextrose 5% (100 units/ml) IV bolus from bag - ADDITIONAL PRN BOLUS - 60 units/kg  As needed (PRN)        Question:  Heparin Infusion Adjustment (DO NOT MODIFY ANSWER)  Answer:  \\[email or web address] [email or web address]\epic\Images\Pharmacy\HeparinInfusions\heparin HIGH INTENSITY nomogram with ANTI-XA [id number]    10/12/22 1440     heparin 25,000 units in dextrose 5% (100 units/ml) IV bolus from bag - ADDITIONAL PRN BOLUS - 30 units/kg  As needed (PRN)        Question:  Heparin Infusion Adjustment (DO NOT MODIFY ANSWER)  Answer:  \\[email or web address]\epic\Images\Pharmacy\HeparinInfusions\heparin HIGH INTENSITY nomogram with ANTI-XA [id number].pdf    10/12/22 1440     heparin 25,000 units in dextrose 5% 250 mL (100 units/mL) infusion HIGH INTENSITY nomogram Anti- Xa  Continuous        Question Answer Comment   Heparin Infusion Adjustment (DO NOT MODIFY ANSWER) \\[email or web address]\epic\Images\Pharmacy\HeparinInfusions\heparin HIGH INTENSITY nomogram with ANTI-XA [id number].pdf    Begin at (in units/kg/hr) 18        10/12/22 1440     IP VTE HIGH RISK PATIENT  Once         10/12/22 0423                    Discharge Planning   GERA: 10/17/2022     Code Status: Full Code   Is the patient medically ready for discharge?:     Reason for patient still in hospital (select all that apply): Patient trending condition           Jesus Manuel Patel MD  Internal Medicine, PGY-1  [facility]

## 2022-10-13 NOTE — ASSESSMENT & PLAN NOTE
Patient admitted initially for suspicion of left lower extremity cellulitis and possible osteomyelitis. LLE U/S negative for DVT. Foot XR not concerning for acute pathology. Will get MRI to further evaluate.       - Started on Vancomycin and Ceftriaxone --> Cefepime  - MRI foot pending   - Podiatry following ; appreciate recommendations

## 2022-10-13 NOTE — PT/OT/SLP PROGRESS
Physical Therapy      Patient Name:  Cosme Lewis   MRN:  [id number]    PT to bedside for PT evaluation x2 attempts. Upon first attempt, pt declined due to pain and swelling in L LE. Upon second attempt pt receiving bedside care for IV placement. PT to return when appropriate.

## 2022-10-13 NOTE — PLAN OF CARE
Problem: Adult Inpatient Plan of Care  Goal: Plan of Care Review  Outcome: Ongoing, Progressing     Problem: Glycemic Control Impaired (Sepsis/Septic Shock)  Goal: Blood Glucose Level Within Desired Range  Outcome: Ongoing, Progressing     Problem: Fluid Volume Excess (Chronic Kidney Disease)  Goal: Fluid Balance  Outcome: Ongoing, Progressing

## 2022-10-13 NOTE — HPI
Mr. Lewis is a 56 y/o male NH resident, HLD, HTN, COPDESRD on HD presents to ED for LLE swelling and cellulitis. Denies having any fever chills or night sweats. Seen by podiatry, no acute intervention at this time. CTA +pulmonary embolism and bilateral pleural effusions and ascites. Blood cultures on admit +Staph aureus and viridan strep. He was ambulatory prior to this.

## 2022-10-13 NOTE — ASSESSMENT & PLAN NOTE
Outpatient HD Information:    -Outpatient HD unit: [facility]  -HD tx days: MWF   -Last HD tx prior to presentation: 10/10/22  -HD access: LUE AVF  -HD modality: iHD   -Residual urine: Anuric       Plan/Recommendations:    -HD today for metabolic clearance and volume management   -Renal diet, if not NPO   -Strict I/O's and daily weights  -Daily renal function panels   -Renally dose meds

## 2022-10-13 NOTE — ASSESSMENT & PLAN NOTE
Patient's FSGs are uncontrolled due to hyperglycemia on current medication regimen.  Last A1c reviewed-   Lab Results   Component Value Date    HGBA1C 11.9 (H) 10/12/2022     Most recent fingerstick glucose reviewed-   Recent Labs   Lab 10/12/22  2005 10/13/22  0813 10/13/22  1227 10/13/22  1341   POCTGLUCOSE 127* 106 53* 83     Current correctional scale  Medium  Increase anti-hyperglycemic dose as follows-   Antihyperglycemics (From admission, onward)    Start     Stop Route Frequency Ordered    10/12/22 2100  insulin detemir U-100 pen 15 Units         -- SubQ 2 times daily 10/12/22 1501    10/12/22 1645  insulin aspart U-100 pen 10 Units         -- SubQ 3 times daily with meals 10/12/22 1501    10/12/22 0718  insulin aspart U-100 pen 1-10 Units         -- SubQ Before meals & nightly PRN 10/12/22 0619        Hold Oral hypoglycemics while patient is in the hospital.

## 2022-10-13 NOTE — PROGRESS NOTES
[facility] - Intensive Care ([place] [address])  Nephrology  Progress Note    Patient Name: Cosme Lewis  MRN: [id number]  Admission Date: 10/11/2022  Hospital Length of Stay: 1 days  Attending Provider: Hal Streeter MD   Primary Care Physician: Primary Doctor No  Principal Problem:Cellulitis of leg, left      Interval History: Tolerated HD yesterday with a net UF of 2.5L.     Objective:     Vital Signs (Most Recent):  Temp: 97.5 °F (36.4 °C) (10/13/22 0847)  Pulse: 75 (10/13/22 0913)  Resp: 16 (10/13/22 0901)  BP: (!) 166/79 (10/13/22 0847)  SpO2: (!) 94 % (10/13/22 1019)  O2 Device (Oxygen Therapy): nasal cannula w/ humidification (10/13/22 0901)   Vital Signs (24h Range):  Temp:  [97.5 °F (36.4 °C)-98.4 °F (36.9 °C)] 97.5 °F (36.4 °C)  Pulse:  [63-79] 75  Resp:  [12-23] 16  SpO2:  [93 %-98 %] 94 %  BP: (109-210)/(63-99) 166/79     Weight: 93 kg (205 lb) (10/12/22 1116)  Body mass index is 30.27 kg/m².  Body surface area is 2.13 meters squared.    I/O last 3 completed shifts:  In: 776 [P.O.:476; IV Piggyback:300]  Out: 3150 [Other:3150]    Physical Exam  Constitutional:       Appearance: Normal appearance.   HENT:      Head: Normocephalic and atraumatic.      Nose: Nose normal.      Mouth/Throat:      Mouth: Mucous membranes are moist.      Pharynx: Oropharynx is clear.   Eyes:      Conjunctiva/sclera: Conjunctivae normal.   Cardiovascular:      Rate and Rhythm: Normal rate and regular rhythm.      Comments: LUE AVF with +thrill and +bruit   Pulmonary:      Effort: Pulmonary effort is normal.      Breath sounds: Normal breath sounds.   Abdominal:      General: Abdomen is flat.      Palpations: Abdomen is soft.   Musculoskeletal:      Cervical back: Normal range of motion and neck supple.      Left lower leg: Edema present.   Skin:     General: Skin is warm and dry.      Findings: Lesion present.   Neurological:      General: No focal deficit present.      Mental Status: He is alert and oriented to person, place, and time.    Psychiatric:         Mood and Affect: Mood normal.         Behavior: Behavior normal.       Significant Labs:  CBC:   Recent Labs   Lab 10/13/22  0322   WBC 4.55   RBC 2.90*   HGB 8.1*   HCT 26.5*      MCV 91   MCH 27.9   MCHC 30.6*       CMP:   Recent Labs   Lab 10/13/22  0322   GLU 81   CALCIUM 8.1*   ALBUMIN 2.4*   PROT 6.3   *   K 4.7   CO2 22*      BUN 22*   CREATININE 3.8*   ALKPHOS 215*   ALT 11   AST 19   BILITOT 0.5       All labs within the past 24 hours have been reviewed.      Assessment/Plan:     * Cellulitis of leg, left  -Plan per primary team     Chronic kidney disease-mineral and bone disorder  -Low phos diet   -Continue renvela 800mg tid with meals     Anemia due to chronic kidney disease, on chronic dialysis  -Transfuse for Hg <7.0    ESRD on dialysis  Outpatient HD Information:    -Outpatient HD unit: [facility]  -HD tx days: MWF   -Last HD tx prior to presentation: 10/10/22  -HD access: LUE AVF  -HD modality: iHD   -Residual urine: Anuric       Plan/Recommendations:    -HD today for metabolic clearance and volume management   -Renal diet, if not NPO   -Strict I/O's and daily weights  -Daily renal function panels   -Renally dose meds          Thank you for your consult. I will follow-up with patient. Please contact us if you have any additional questions.    Abdulaziz Forbes, NP  Nephrology  [facility] - Int[facility] Care ([place]

## 2022-10-13 NOTE — ASSESSMENT & PLAN NOTE
Positive staph aureus bacteremia on blood cultures    - Continue vancomycin  - Infectious disease consulted

## 2022-10-13 NOTE — ASSESSMENT & PLAN NOTE
Patient with history COPD treated with Breo, albuterol. Tachypneic in the ED saturating 94% and 6L on nasal cannula. Uses 2L O2 at home. Found to have multiple PE's on chest CT. Started on heparin. Currently sating 95 on 5L    - Heparin gtt for PE  - Will transition to oral AC pending evaluation for Osteomyelitis  - Continue home inhalers  - Will need outpatient pulmonology follow up for COPD  - Possible cardiogenic contribution given acute drop in EF

## 2022-10-13 NOTE — PROGRESS NOTES
Pharmacokinetic Initial Assessment: IV Vancomycin    Assessment/Plan:    Mr. Lewis received vancomycin with loading dose of 1750 mg once in the ED.  - He has ESRD and is on iHD MWF outpatient. Nephrology was consulted and is managing inpatient HD.  - He received HD for 3.5 hours today (between his loading dose and the level).  - A random level was drawn after dialysis finished and resulted at 14.1.  - Will re-dose now with vancomycin 500 mg after dialysis since a loading dose was given before dialysis.  - Will re-dose with subsequent doses when random concentrations are less than 20 mcg/mL with dialysis sessions.  - Desired empiric serum trough concentration is 10 to 20 mcg/mL.  - Draw vancomycin random level on 10/14 with AM labs.    Pharmacy will continue to follow and monitor vancomycin.      Please contact pharmacy at extension [phone] with any questions regarding this assessment.     Thank you for the consult,   Wendy Jauregui       Patient brief summary:  Cosme Lewis is a 57 y.o. male initiated on antimicrobial therapy with IV Vancomycin for treatment of suspected skin & soft tissue infection    Actual Body Weight:   93 kg    Renal Function:   Estimated Creatinine Clearance: 17.6 mL/min (A) (based on SCr of 5.1 mg/dL (H)).    Dialysis Method (if applicable):  intermittent HD - MWF outpatient and nephro managing inpatient. Last HD today (10/12)

## 2022-10-13 NOTE — ASSESSMENT & PLAN NOTE
Mr. Lewis is a 58 y/o male NH resident, HLD, HTN, COPD on O2, ESRD on HD presents to ED for LLE swelling and cellulitis. U/s negative for LLE DVT. CTA +pulmonary embolism and bilateral pleural effusions and ascites. Blood cultures on admit +Staph aureus and viridan strep. ID consulted for abx mgmt.     Recommendations  1. Continue IV vancomycin. Trough goal 15-20  2. D/c cefepime today. Podiatry following, no acute intervention. Agree with MRI studies to r/o abscess and/or deep soft tissue infection. Recommend general surgery evaluation as with significant pain and swelling of LLE.  Added on CRP studies today   3. U/s LLE negative for DVT. CTA +PE. ?septic emboli as with positive blood cultures?  4. Repeat blood cultures today  5. Recommend TTE     Seen and discussed with ID staff. ID will follow with you

## 2022-10-14 PROBLEM — S81.802A LEG WOUND, LEFT: Status: ACTIVE | Noted: 2022-10-14

## 2022-10-14 LAB
ALBUMIN SERPL BCP-MCNC: 2.3 G/DL (ref 3.5–5.2)
ALP SERPL-CCNC: 217 U/L (ref 55–135)
ALT SERPL W/O P-5'-P-CCNC: 13 U/L (ref 10–44)
ANION GAP SERPL CALC-SCNC: 8 MMOL/L (ref 8–16)
AST SERPL-CCNC: 21 U/L (ref 10–40)
BACTERIA BLD CULT: ABNORMAL
BASOPHILS # BLD AUTO: 0.01 K/UL (ref 0–0.2)
BASOPHILS NFR BLD: 0.2 % (ref 0–1.9)
BILIRUB SERPL-MCNC: 0.5 MG/DL (ref 0.1–1)
BUN SERPL-MCNC: 32 MG/DL (ref 6–20)
CALCIUM SERPL-MCNC: 7.8 MG/DL (ref 8.7–10.5)
CHLORIDE SERPL-SCNC: 100 MMOL/L (ref 95–110)
CO2 SERPL-SCNC: 25 MMOL/L (ref 23–29)
CREAT SERPL-MCNC: 5.1 MG/DL (ref 0.5–1.4)
DIFFERENTIAL METHOD: ABNORMAL
EOSINOPHIL # BLD AUTO: 0.1 K/UL (ref 0–0.5)
EOSINOPHIL NFR BLD: 1.6 % (ref 0–8)
ERYTHROCYTE [DISTWIDTH] IN BLOOD BY AUTOMATED COUNT: 15.7 % (ref 11.5–14.5)
EST. GFR  (NO RACE VARIABLE): 12.3 ML/MIN/1.73 M^2
FACT X PPP CHRO-ACNC: 0.31 IU/ML (ref 0.3–0.7)
FACT X PPP CHRO-ACNC: 0.39 IU/ML (ref 0.3–0.7)
GLUCOSE SERPL-MCNC: 201 MG/DL (ref 70–110)
HAV IGM SERPL QL IA: NORMAL
HBV CORE IGM SERPL QL IA: NORMAL
HBV SURFACE AG SERPL QL IA: NORMAL
HCT VFR BLD AUTO: 25.7 % (ref 40–54)
HCV AB SERPL QL IA: NORMAL
HGB BLD-MCNC: 7.8 G/DL (ref 14–18)
IMM GRANULOCYTES # BLD AUTO: 0.01 K/UL (ref 0–0.04)
IMM GRANULOCYTES NFR BLD AUTO: 0.2 % (ref 0–0.5)
LYMPHOCYTES # BLD AUTO: 0.7 K/UL (ref 1–4.8)
LYMPHOCYTES NFR BLD: 11.8 % (ref 18–48)
MAGNESIUM SERPL-MCNC: 1.9 MG/DL (ref 1.6–2.6)
MCH RBC QN AUTO: 27.8 PG (ref 27–31)
MCHC RBC AUTO-ENTMCNC: 30.4 G/DL (ref 32–36)
MCV RBC AUTO: 92 FL (ref 82–98)
MONOCYTES # BLD AUTO: 0.8 K/UL (ref 0.3–1)
MONOCYTES NFR BLD: 13.8 % (ref 4–15)
NEUTROPHILS # BLD AUTO: 4 K/UL (ref 1.8–7.7)
NEUTROPHILS NFR BLD: 72.4 % (ref 38–73)
NRBC BLD-RTO: 0 /100 WBC
PHOSPHATE SERPL-MCNC: 3.4 MG/DL (ref 2.7–4.5)
PLATELET # BLD AUTO: 206 K/UL (ref 150–450)
PMV BLD AUTO: 10.8 FL (ref 9.2–12.9)
POCT GLUCOSE: 224 MG/DL (ref 70–110)
POCT GLUCOSE: 261 MG/DL (ref 70–110)
POCT GLUCOSE: 77 MG/DL (ref 70–110)
POCT GLUCOSE: 94 MG/DL (ref 70–110)
POTASSIUM SERPL-SCNC: 5.1 MMOL/L (ref 3.5–5.1)
PROT SERPL-MCNC: 6 G/DL (ref 6–8.4)
RBC # BLD AUTO: 2.81 M/UL (ref 4.6–6.2)
SODIUM SERPL-SCNC: 133 MMOL/L (ref 136–145)
VANCOMYCIN SERPL-MCNC: 17 UG/ML
WBC # BLD AUTO: 5.58 K/UL (ref 3.9–12.7)

## 2022-10-14 PROCEDURE — 25000003 PHARM REV CODE 250

## 2022-10-14 PROCEDURE — 99900035 HC TECH TIME PER 15 MIN (STAT)

## 2022-10-14 PROCEDURE — 63600175 PHARM REV CODE 636 W HCPCS: Performed by: STUDENT IN AN ORGANIZED HEALTH CARE EDUCATION/TRAINING PROGRAM

## 2022-10-14 PROCEDURE — 85520 HEPARIN ASSAY: CPT | Performed by: STUDENT IN AN ORGANIZED HEALTH CARE EDUCATION/TRAINING PROGRAM

## 2022-10-14 PROCEDURE — 20600001 HC STEP DOWN PRIVATE ROOM

## 2022-10-14 PROCEDURE — 85025 COMPLETE CBC W/AUTO DIFF WBC: CPT | Performed by: STUDENT IN AN ORGANIZED HEALTH CARE EDUCATION/TRAINING PROGRAM

## 2022-10-14 PROCEDURE — 99233 SBSQ HOSP IP/OBS HIGH 50: CPT | Mod: ,,, | Performed by: INTERNAL MEDICINE

## 2022-10-14 PROCEDURE — 94640 AIRWAY INHALATION TREATMENT: CPT

## 2022-10-14 PROCEDURE — 85520 HEPARIN ASSAY: CPT | Mod: 91 | Performed by: STUDENT IN AN ORGANIZED HEALTH CARE EDUCATION/TRAINING PROGRAM

## 2022-10-14 PROCEDURE — 80100016 HC MAINTENANCE HEMODIALYSIS

## 2022-10-14 PROCEDURE — 84100 ASSAY OF PHOSPHORUS: CPT | Performed by: STUDENT IN AN ORGANIZED HEALTH CARE EDUCATION/TRAINING PROGRAM

## 2022-10-14 PROCEDURE — 63600175 PHARM REV CODE 636 W HCPCS: Performed by: PHYSICIAN ASSISTANT

## 2022-10-14 PROCEDURE — 99233 SBSQ HOSP IP/OBS HIGH 50: CPT | Mod: ,,, | Performed by: PHYSICIAN ASSISTANT

## 2022-10-14 PROCEDURE — 99233 PR SUBSEQUENT HOSPITAL CARE,LEVL III: ICD-10-PCS | Mod: GC,,, | Performed by: STUDENT IN AN ORGANIZED HEALTH CARE EDUCATION/TRAINING PROGRAM

## 2022-10-14 PROCEDURE — 27000221 HC OXYGEN, UP TO 24 HOURS

## 2022-10-14 PROCEDURE — 94761 N-INVAS EAR/PLS OXIMETRY MLT: CPT

## 2022-10-14 PROCEDURE — 63600175 PHARM REV CODE 636 W HCPCS

## 2022-10-14 PROCEDURE — 83735 ASSAY OF MAGNESIUM: CPT | Performed by: STUDENT IN AN ORGANIZED HEALTH CARE EDUCATION/TRAINING PROGRAM

## 2022-10-14 PROCEDURE — 99233 SBSQ HOSP IP/OBS HIGH 50: CPT | Mod: GC,,, | Performed by: STUDENT IN AN ORGANIZED HEALTH CARE EDUCATION/TRAINING PROGRAM

## 2022-10-14 PROCEDURE — 80053 COMPREHEN METABOLIC PANEL: CPT | Performed by: STUDENT IN AN ORGANIZED HEALTH CARE EDUCATION/TRAINING PROGRAM

## 2022-10-14 PROCEDURE — 99233 PR SUBSEQUENT HOSPITAL CARE,LEVL III: ICD-10-PCS | Mod: ,,, | Performed by: INTERNAL MEDICINE

## 2022-10-14 PROCEDURE — 25000242 PHARM REV CODE 250 ALT 637 W/ HCPCS

## 2022-10-14 PROCEDURE — 80202 ASSAY OF VANCOMYCIN: CPT | Performed by: STUDENT IN AN ORGANIZED HEALTH CARE EDUCATION/TRAINING PROGRAM

## 2022-10-14 PROCEDURE — 99233 PR SUBSEQUENT HOSPITAL CARE,LEVL III: ICD-10-PCS | Mod: ,,, | Performed by: PHYSICIAN ASSISTANT

## 2022-10-14 RX ORDER — CEFAZOLIN SODIUM 1 G/50ML
1 SOLUTION INTRAVENOUS EVERY 24 HOURS
Status: DISCONTINUED | OUTPATIENT
Start: 2022-10-14 | End: 2022-10-15 | Stop reason: HOSPADM

## 2022-10-14 RX ADMIN — CLONIDINE HYDROCHLORIDE 0.3 MG: 0.2 TABLET ORAL at 03:10

## 2022-10-14 RX ADMIN — HEPARIN SODIUM 24 UNITS/KG/HR: 10000 INJECTION, SOLUTION INTRAVENOUS at 07:10

## 2022-10-14 RX ADMIN — ISOSORBIDE MONONITRATE 30 MG: 30 TABLET, EXTENDED RELEASE ORAL at 12:10

## 2022-10-14 RX ADMIN — CARVEDILOL 3.12 MG: 3.12 TABLET, FILM COATED ORAL at 09:10

## 2022-10-14 RX ADMIN — IPRATROPIUM BROMIDE AND ALBUTEROL SULFATE 3 ML: 2.5; .5 SOLUTION RESPIRATORY (INHALATION) at 07:10

## 2022-10-14 RX ADMIN — INSULIN ASPART 6 UNITS: 100 INJECTION, SOLUTION INTRAVENOUS; SUBCUTANEOUS at 09:10

## 2022-10-14 RX ADMIN — NIFEDIPINE 60 MG: 30 TABLET, FILM COATED, EXTENDED RELEASE ORAL at 12:10

## 2022-10-14 RX ADMIN — CARVEDILOL 3.12 MG: 3.12 TABLET, FILM COATED ORAL at 12:10

## 2022-10-14 RX ADMIN — IPRATROPIUM BROMIDE AND ALBUTEROL SULFATE 3 ML: 2.5; .5 SOLUTION RESPIRATORY (INHALATION) at 12:10

## 2022-10-14 RX ADMIN — IPRATROPIUM BROMIDE AND ALBUTEROL SULFATE 3 ML: 2.5; .5 SOLUTION RESPIRATORY (INHALATION) at 08:10

## 2022-10-14 RX ADMIN — INSULIN DETEMIR 15 UNITS: 100 INJECTION, SOLUTION SUBCUTANEOUS at 09:10

## 2022-10-14 RX ADMIN — FLUOXETINE 40 MG: 20 CAPSULE ORAL at 12:10

## 2022-10-14 RX ADMIN — INSULIN ASPART 10 UNITS: 100 INJECTION, SOLUTION INTRAVENOUS; SUBCUTANEOUS at 09:10

## 2022-10-14 RX ADMIN — CEFAZOLIN SODIUM 1 G: 1 SOLUTION INTRAVENOUS at 03:10

## 2022-10-14 RX ADMIN — IPRATROPIUM BROMIDE AND ALBUTEROL SULFATE 3 ML: 2.5; .5 SOLUTION RESPIRATORY (INHALATION) at 11:10

## 2022-10-14 RX ADMIN — CETIRIZINE HYDROCHLORIDE 10 MG: 10 TABLET, FILM COATED ORAL at 12:10

## 2022-10-14 RX ADMIN — IPRATROPIUM BROMIDE AND ALBUTEROL SULFATE 3 ML: 2.5; .5 SOLUTION RESPIRATORY (INHALATION) at 04:10

## 2022-10-14 RX ADMIN — CLONIDINE HYDROCHLORIDE 0.3 MG: 0.2 TABLET ORAL at 09:10

## 2022-10-14 RX ADMIN — ATORVASTATIN CALCIUM 40 MG: 40 TABLET, FILM COATED ORAL at 12:10

## 2022-10-14 RX ADMIN — ERYTHROPOIETIN 4700 UNITS: 10000 INJECTION, SOLUTION INTRAVENOUS; SUBCUTANEOUS at 10:10

## 2022-10-14 RX ADMIN — APIXABAN 10 MG: 5 TABLET, FILM COATED ORAL at 09:10

## 2022-10-14 RX ADMIN — INSULIN ASPART 10 UNITS: 100 INJECTION, SOLUTION INTRAVENOUS; SUBCUTANEOUS at 12:10

## 2022-10-14 NOTE — SUBJECTIVE & OBJECTIVE
Interval History:   General surgery following, no evidence of compartment syndrome or necrotizing fasciitis. LLE swelling muhc improved. Repeat blood culturse NGTD. HD today. Moving his legs better    Review of Systems   Constitutional:  Negative for activity change, appetite change, chills, diaphoresis and fever.   Respiratory:  Negative for cough and shortness of breath.    Cardiovascular:  Positive for leg swelling.   Gastrointestinal:  Negative for abdominal pain, diarrhea, nausea and vomiting.   Genitourinary:  Negative for dysuria.   Musculoskeletal:  Negative for back pain.   Skin:  Negative for color change, pallor, rash and wound.   Neurological:  Negative for dizziness.   Objective:     Vital Signs (Most Recent):  Temp: 98.7 °F (37.1 °C) (10/14/22 1234)  Pulse: 75 (10/14/22 1242)  Resp: 20 (10/14/22 1242)  BP: (!) 170/81 (10/14/22 1234)  SpO2: 95 % (10/14/22 1242)   Vital Signs (24h Range):  Temp:  [97.7 °F (36.5 °C)-98.7 °F (37.1 °C)] 98.7 °F (37.1 °C)  Pulse:  [64-82] 75  Resp:  [16-24] 20  SpO2:  [90 %-98 %] 95 %  BP: (124-176)/(67-99) 170/81     Weight: 93 kg (205 lb)  Body mass index is 30.27 kg/m².    Estimated Creatinine Clearance: 17.6 mL/min (A) (based on SCr of 5.1 mg/dL (H)).    Physical Exam  Vitals and nursing note reviewed.   Constitutional:       General: He is not in acute distress.     Appearance: He is well-developed. He is not diaphoretic.   HENT:      Head: Normocephalic and atraumatic.   Eyes:      Pupils: Pupils are equal, round, and reactive to light.   Cardiovascular:      Rate and Rhythm: Normal rate and regular rhythm.      Heart sounds: Normal heart sounds. No murmur heard.    No friction rub. No gallop.   Pulmonary:      Effort: Pulmonary effort is normal. No respiratory distress.      Breath sounds: Normal breath sounds. No wheezing or rales.   Chest:      Chest wall: No tenderness.   Abdominal:      General: Bowel sounds are normal. There is no distension.      Palpations:  There is no mass.      Tenderness: There is no abdominal tenderness. There is no guarding.   Musculoskeletal:         General: Swelling (improving) and tenderness present. No deformity. Normal range of motion.      Cervical back: Normal range of motion and neck supple.      Left lower leg: Edema present.      Comments: More ambulatory today   Skin:     General: Skin is warm and dry.      Findings: Erythema present. No rash.   Neurological:      Mental Status: He is alert and oriented to person, place, and time.   Psychiatric:         Behavior: Behavior normal.         Thought Content: Thought content normal.         Judgment: Judgment normal.       Significant Labs: All pertinent labs within the past 24 hours have been reviewed.    Significant Imaging: I have reviewed all pertinent imaging results/findings within the past 24 hours.

## 2022-10-14 NOTE — ASSESSMENT & PLAN NOTE
57M with above comorbidities with left lower extremity cellulitis up to his thigh. Since admission has been improving, but his leg pain is persistent.  Concern for compartment syndrome.  He has 1+ pulse on the left, intact sensation, slightly decreased strength (but seems limited by pain) and pain with passive dorsiflexion in the gastroc.  CK is 137 (normal)    With two days of pain and normal CK in a patient with ESRD, my suspicion for compartment syndrome is low. If he had compartment, I would expect CK to be markedly elevated.    However, his clinical exam is concerning so I obtained compartment pressures with the Mac needle.  They were:  Lateral: 10 mm Hg  Anterior: 9 mm Hg  Superficial posterior: 12 mm Hg  Deep posterior: 19 mm Hg    He does not have compartment syndrome.    Recommend continued antibiotics.    Will follow.

## 2022-10-14 NOTE — PROGRESS NOTES
Surgery Note:     Patient re-examined. 2+ DP and PT pulses in LLE. Leg is edematous, but relatively soft. Improved active and passive ROM. Pain and tenderness much improved since yesterday.     Agree with ongoing antibiotic treatment of cellulitis. No nec fasc or compartment syndrome. Surgery will sign off.     Ivory Roberts MD  General Surgery Resident, PGY V  Pager [phone]

## 2022-10-14 NOTE — SUBJECTIVE & OBJECTIVE
No current facility-administered medications on file prior to encounter.     Current Outpatient Medications on File Prior to Encounter   Medication Sig    acetaminophen (TYLENOL) 650 MG TbSR Take 650 mg by mouth every 8 (eight) hours as needed (pain or fever over 100.4).    albuterol (PROVENTIL/VENTOLIN HFA) 90 mcg/actuation inhaler Inhale 2 puffs into the lungs every 6 (six) hours as needed for Wheezing or Shortness of Breath.    aspirin (ECOTRIN) 81 MG EC tablet Take 81 mg by mouth once daily.    calcium acetate,phosphat bind, (PHOSLO) 667 mg capsule Take 667 mg by mouth 3 (three) times daily.    cetirizine (ZYRTEC) 10 MG tablet Take 10 mg by mouth daily as needed (itching).    cloNIDine 0.3 mg/24 hr td ptwk (CATAPRES) 0.3 mg/24 hr Place 1 patch onto the skin every Saturday.    FLUoxetine 40 MG capsule Take 40 mg by mouth once daily.    fluticasone-salmeterol diskus inhaler 250-50 mcg Inhale 1 puff into the lungs 2 (two) times daily.    GLUCAGON EMERGENCY KIT, HUMAN, 1 mg injection 1 mg into the muscle as needed if CBG < 70    hydrALAZINE (APRESOLINE) 100 MG tablet Take 100 mg by mouth 3 (three) times daily as needed (blood pressure  greater than 190/100).    HYDROPHILIC CREAM TOP Apply topically. Apply liberal amount to affected area twice daily for dry skin    hydrOXYzine HCL (ATARAX) 10 MG Tab Take 10 mg by mouth 3 (three) times daily as needed (itching).    insulin aspart (NOVOLOG FLEXPEN U-100 INSULIN SUBQ) Inject into the skin before meals and at bedtime per sliding scale: 0-60= OJ or glucose tab; = 0; 201-250= 2; 251-300= 4; 301-350= 6; 351-400= 8; greater than 400= 10 units then call MD    insulin aspart U-100 (NOVOLOG) 100 unit/mL (3 mL) InPn pen Inject 8 units into the skin with breakfast and lunch & 10 units with dinner    isosorbide mononitrate (IMDUR) 30 MG 24 hr tablet Take 30 mg by mouth 2 (two) times daily.    lisinopriL (PRINIVIL,ZESTRIL) 40 MG tablet Take 40 mg by mouth once daily.     melatonin 3 mg TbDL Take 9 mg by mouth nightly as needed (sleep).    NIFEdipine (ADALAT CC) 60 MG TbSR Take 120 mg by mouth every evening.    OLANZapine (ZYPREXA) 5 MG tablet Take 5 mg by mouth daily as needed (restlessness/ agitation).    oxymetazoline (AFRIN) 0.05 % nasal spray 2 sprays by Nasal route 2 (two) times daily as needed for Congestion.    rosuvastatin (CRESTOR) 20 MG tablet Take 20 mg by mouth every evening.    sevelamer carbonate (RENVELA) 800 mg Tab Take 800 mg by mouth 3 (three) times daily.    sodium chloride (OCEAN) 0.65 % nasal spray 2 sprays by Nasal route every 4 (four) hours as needed for Congestion.    tiotropium bromide (SPIRIVA RESPIMAT) 2.5 mcg/actuation inhaler Inhale 2 puffs into the lungs Daily.    triamcinolone acetonide 0.025 % Lotn Apply topically. Apply to the affected area twice daily    vitamin renal formula, B-complex-vitamin c-folic acid, (NEPHROCAP) 1 mg Cap Take 1 capsule by mouth once daily.    insulin detemir U-100 (LEVEMIR FLEXTOUCH) 100 unit/mL (3 mL) SubQ InPn pen Inject 12 units into the skin every morning & 6 units at bedtime       Review of patient's allergies indicates:  No Known Allergies    Past Medical History:   Diagnosis Date    Chronic kidney disease-mineral and bone disorder 10/12/2022    COPD (chronic obstructive pulmonary disease)     Diabetes mellitus type 1     ESRD on dialysis     Hypertension     SOB (shortness of breath) 10/12/2022    Patient with history COPD treated with Ellipta the albuterol, fluticasone-salmeterol tachypneic in the ED saturating 94% and 6 L on nasal cannula, patient does not know how many  he uses it is in nursing home.    -duo nebs Q 4  Given that patient is a poor historian, and in the setting of left lower extremity edema and history of coagulopathy PE cannot be ruled out.  Will workup for possible infec     Past Surgical History:   Procedure Laterality Date    AV FISTULA PLACEMENT       Family History       Problem Relation (Age  of Onset)    Diabetes Mother          Tobacco Use    Smoking status: Never    Smokeless tobacco: Never   Substance and Sexual Activity    Alcohol use: Never    Drug use: Not on file    Sexual activity: Not on file     Review of Systems   Constitutional:  Positive for activity change and appetite change. Negative for fever.   HENT: Negative.     Eyes: Negative.    Respiratory: Negative.     Cardiovascular:  Positive for chest pain and leg swelling.   Gastrointestinal: Negative.    Endocrine: Negative.    Genitourinary: Negative.    Musculoskeletal:  Positive for myalgias.   Skin:  Positive for color change and rash.   Allergic/Immunologic: Negative.    Neurological: Negative.    Hematological: Negative.    Psychiatric/Behavioral: Negative.     Objective:     Vital Signs (Most Recent):  Temp: 98.2 °F (36.8 °C) (10/13/22 1915)  Pulse: 72 (10/13/22 1915)  Resp: 18 (10/13/22 1915)  BP: 124/67 (10/13/22 1915)  SpO2: (!) 92 % (10/13/22 1915)   Vital Signs (24h Range):  Temp:  [97.5 °F (36.4 °C)-98.2 °F (36.8 °C)] 98.2 °F (36.8 °C)  Pulse:  [63-76] 72  Resp:  [12-24] 18  SpO2:  [90 %-98 %] 92 %  BP: (109-170)/(63-82) 124/67     Weight: 93 kg (205 lb)  Body mass index is 30.27 kg/m².    Physical Exam  Vitals reviewed.   Constitutional:       Appearance: Normal appearance.   HENT:      Head: Normocephalic.   Cardiovascular:      Rate and Rhythm: Normal rate.   Pulmonary:      Effort: Pulmonary effort is normal. No respiratory distress.   Abdominal:      General: Abdomen is flat. There is no distension.      Tenderness: There is no abdominal tenderness.      Hernia: No hernia is present.   Skin:     General: Skin is warm and dry.      Capillary Refill: Capillary refill takes less than 2 seconds.      Comments: Left lower extremity with swelling and induration up to the thigh  Tender to palpation  Pain with passive flexion  Intact sensation  Strength is less on the left, but seems limited due to pain  2+ right DP  1+ left DP    Neurological:      General: No focal deficit present.      Mental Status: He is alert.   Psychiatric:         Mood and Affect: Mood normal.       Significant Labs:  I have reviewed all pertinent lab results within the past 24 hours.  CBC:   Recent Labs   Lab 10/13/22  0322   WBC 4.55   RBC 2.90*   HGB 8.1*   HCT 26.5*      MCV 91   MCH 27.9   MCHC 30.6*     CMP:   Recent Labs   Lab 10/13/22  0322   GLU 81   CALCIUM 8.1*   ALBUMIN 2.4*   PROT 6.3   *   K 4.7   CO2 22*      BUN 22*   CREATININE 3.8*   ALKPHOS 215*   ALT 11   AST 19   BILITOT 0.5         Significant Diagnostics:  I have reviewed all pertinent imaging results/findings within the past 24 hours.    Pending arterial US and CT leg

## 2022-10-14 NOTE — PROGRESS NOTES
Pharmacokinetic Assessment Follow Up: IV Vancomycin    Vancomycin serum concentration assessment(s):    The random level was drawn correctly and can be used to guide therapy at this time. The measurement is within the desired definitive target range of 15 to 20 mcg/mL.    Vancomycin Regimen Plan:    Give 750 mg x 1 after HD. Then, re-dose when the random level is less than 25 mcg/mL, next level to be drawn at 0500 on 10/15 for surveillance.     Drug levels (last 3 results):  Recent Labs   Lab Result Units 10/12/22  2123 10/14/22  0149   Vancomycin, Random ug/mL 14.1 17.0       Pharmacy will continue to follow and monitor vancomycin.    Please contact pharmacy at extension [phone] for questions regarding this assessment.    Thank you for the consult,   Tere Nobles       Patient brief summary:  Cosme Lewis is a 57 y.o. male initiated on antimicrobial therapy with IV Vancomycin for treatment of bacteremia      Drug Allergies:   Review of patient's allergies indicates:  No Known Allergies    Actual Body Weight:   93 kg    Renal Function:   Estimated Creatinine Clearance: 17.6 mL/min (A) (based on SCr of 5.1 mg/dL (H)).,     Dialysis Method (if applicable):  intermittent HD    CBC (last 72 hours):  Recent Labs   Lab Result Units 10/11/22  1848 10/12/22  0435 10/12/22  1907 10/13/22  0322 10/14/22  0149   WBC K/uL 6.51  --  5.79 4.55 5.58   Hemoglobin g/dL 8.9*  --  8.9* 8.1* 7.8*   Hemoglobin A1C %  --  11.9*  --   --   --    Hematocrit % 30.9*  --  29.9* 26.5* 25.7*   Platelets K/uL 253  --  304 242 206   Gran % % 72.2  --  77.3* 70.2 72.4   Lymph % % 15.1*  --  8.5* 13.4* 11.8*   Mono % % 11.2  --  12.4 13.2 13.8   Eosinophil % % 0.9  --  1.2 2.6 1.6   Basophil % % 0.3  --  0.3 0.4 0.2   Differential Method  Automated  --  Automated Automated Automated       Metabolic Panel (last 72 hours):  Recent Labs   Lab Result Units 10/11/22  1848 10/12/22  0435 10/12/22  1033 10/13/22  0322 10/14/22  0149   Sodium mmol/L 131*  133*  --  133* 133*   Potassium mmol/L 4.9 4.4  --  4.7 5.1   Chloride mmol/L 91* 93*  --  102 100   CO2 mmol/L 24 27  --  22* 25   Glucose mg/dL 484* 447*  --  81 201*   BUN mg/dL 32* 36*  --  22* 32*   Creatinine mg/dL 4.5* 5.1*  --  3.8* 5.1*   Albumin g/dL 2.7*  --  2.5* 2.4* 2.3*   Total Bilirubin mg/dL 0.8  --  0.6 0.5 0.5   Alkaline Phosphatase U/L 288*  --  243* 215* 217*   AST U/L 37  --  17 19 21   ALT U/L 17  --  13 11 13   Magnesium mg/dL  --   --   --  1.9 1.9   Phosphorus mg/dL  --   --   --  2.9 3.4       Vancomycin Administrations:  vancomycin given in the last 96 hours                     vancomycin 500 mg in dextrose 5 % 100 mL IVPB (ready to mix system) (mg) 500 mg New Bag 10/13/22 0119    vancomycin 1.75 g in 5 % dextrose 500 mL IVPB (mg) 1,750 mg New Bag 10/11/22 2256                    Microbiologic Results:  Microbiology Results (last 7 days)       Procedure Component Value Units Date/Time    Blood culture #2 **CANNOT BE ORDERED STAT** [[id number]]  (Abnormal)  (Susceptibility) Collected: 10/11/22 2054    Order Status: Completed Specimen: Blood from Peripheral, Hand, Right Updated: 10/14/22 0931     Blood Culture, Routine Gram stain catrachita bottle: Gram positive cocci in clusters resembling Staph      Results called to and read back by: Criselda Armendariz RN  10/12/2022      13:58      Gram stain aer bottle: Gram positive cocci in clusters resembling Staph      Positive results previously called 10/13/2022  13:08      STAPHYLOCOCCUS AUREUS  ID consult required at [facility].Scott Menjivar and Charlie nesbitt.      Blood culture [[id number]] Collected: 10/12/22 0428    Order Status: Completed Specimen: Blood from Peripheral, Forearm, Right Updated: 10/14/22 0613     Blood Culture, Routine No Growth to date      No Growth to date      No Growth to date    Narrative:      Right arm    Blood culture [[id number]] Collected: 10/12/22 1907    Order Status: Completed Specimen: Blood Updated: 10/13/22 2022     Blood  Culture, Routine No Growth to date      No Growth to date    Narrative:      Collection has been rescheduled by [email or web address] at 10/12/2022 14:28 Reason:   Patient unavailable notified Dashana [phone]  Collection has been rescheduled by DC11 at 10/12/2022 14:49 Reason:   Patient unavailable went to dialysis spoke to in coming nurse  Collection has been rescheduled by DC11 at 10/12/2022 16:44 Reason:   Patient still unavailable still at dialysis   Collection has been rescheduled by DC11 at 10/12/2022 14:28 Reason:   Patient unavailable notified Dashana [id number]  Collection has been rescheduled by DC11 at 10/12/2022 14:49 Reason:   Patient unavailable went to dialysis spoke to in coming nurse  Collection has been rescheduled by DC11 at 10/12/2022 16:44 Reason:   Patient still unavailable still at dialysis     Blood culture [[id number]] Collected: 10/12/22 1923    Order Status: Completed Specimen: Blood Updated: 10/13/22 2022     Blood Culture, Routine No Growth to date      No Growth to date    Narrative:      Collection has been rescheduled by [email or web address] at 10/12/2022 14:28 Reason:   Patient unavailable notified Dashana [id number]  Collection has been rescheduled by DC11 at 10/12/2022 14:49 Reason:   Patient unavailable went to dialysis spoke to in coming nurse  Collection has been rescheduled by DC11 at 10/12/2022 16:44 Reason:   Patient still unavailable still at dialysis   Collection has been rescheduled by DC11 at 10/12/2022 14:28 Reason:   Patient unavailable notified Dashana [phone]  Collection has been rescheduled by DC11 at 10/12/2022 14:49 Reason:   Patient unavailable went to dialysis spoke to in coming nurse  Collection has been rescheduled by DC11 at 10/12/2022 16:44 Reason:   Patient still unavailable still at dialysis     Blood culture #1 **CANNOT BE ORDERED STAT** [[id number]]  (Abnormal) Collected: 10/11/22 2054    Order Status: Completed Specimen: Blood from Peripheral, Forearm, Right Updated: 10/13/22 1257     Blood Culture,  Routine Gram stain catrachita bottle: Gram positive cocci in chains resembling Strep      Results called to and read back by: Criselda Armendariz RN  10/12/2022      14:01      VIRIDANS STREPTOCOCCUS GROUP  Susceptibility testing not routinely performed      MRSA/SA Rapid ID by PCR from Blood culture [[id number]] Collected: 10/11/22 2054    Order Status: Completed Updated: 10/12/22 1505     Staph aureus ID by PCR Negative     MRSA ID by PCR Negative    Culture, Respiratory with Gram Stain [[id number]]     Order Status: No result Specimen: Respiratory     Blood culture [[id number]]     Order Status: Canceled Specimen: Blood

## 2022-10-14 NOTE — PROGRESS NOTES
CT reviewed  Noted gas is in fact consistent with the location of [place] needle placement and is not an indication of nec fasc    ABDULKADIR Blanc MD   General Surgery, PGY-5

## 2022-10-14 NOTE — NURSING
Patient second therapeutic Anti-xa.   Results 0.39 (0144 on 10/14/2022) and 0.31 (0800 on 10/14/2022). Will redraw anti- xa with morning labs.

## 2022-10-14 NOTE — ASSESSMENT & PLAN NOTE
Results for orders placed during the hospital encounter of 10/11/22    Echo    Interpretation Summary  · Severe left atrial enlargement.  · The left ventricle is normal in size with concentric hypertrophy and mildly decreased systolic function.  · The estimated ejection fraction is 40%.  · The left ventricular global longitudinal strain is -12%.  · Grade II left ventricular diastolic dysfunction.  · Mild right ventricular enlargement with mildly reduced right ventricular systolic function.  · Mild aortic regurgitation.  · Mild mitral regurgitation.  · Mild tricuspid regurgitation.  · Elevated central venous pressure (15 mmHg).  · There is pulmonary hypertension. The estimated PA systolic pressure is 65 mmHg.  · Small circumferential pericardial effusion.  · There is a left pleural effusion.  · Mild right atrial enlargement.      -will continue to assist with vol removal  -Plan per primary team

## 2022-10-14 NOTE — HPI
58 yo M with T1DM, ESRD on HD (MWF), HTN, HFpEF (EF 55%), HLD, chronic hypoxemic resp failure 2/2 COPD (on home O2), h/o DVT/PE (not currently on AC) admitted from NH for evaluation of L leg pain + swelling x 2 days.    He was found to have PE and put on heparin gtt.    General surgery consulted to evaluate for compartment syndrome of left lower extremity. He has had persistent pain and swelling for the last two days. It has not improved. It is painful to touch and with passive flexion. He has preserved sensation and motor function, though it feels a little weaker on the left side. He is moving it spontaneously in bed without significant discomfort. He has erythema up to his thigh.

## 2022-10-14 NOTE — PROGRESS NOTES
VANCOMYCIN DOSING BY PHARMACY DISCONTINUATION NOTE    Cosme Lewis is a 57 y.o. male who had been consulted for vancomycin dosing.    The pharmacy consult for vancomycin dosing has been discontinued.     Vancomycin Dosing by Pharmacy Consult will sign-off. Please reconsult if necessary. Thank you for allowing us to participate in this patient's care.     Thank you for the consult,   Yazan Gaston, Pharm.D.  Inpatient Pharmacist  EXT [phone]

## 2022-10-14 NOTE — PLAN OF CARE
Problem: Adult Inpatient Plan of Care  Goal: Plan of Care Review  Outcome: Ongoing, Progressing  Goal: Patient-Specific Goal (Individualized)  Outcome: Ongoing, Progressing  Goal: Absence of Hospital-Acquired Illness or Injury  Outcome: Ongoing, Progressing  Goal: Optimal Comfort and Wellbeing  Outcome: Ongoing, Progressing  Goal: Readiness for Transition of Care  Outcome: Ongoing, Progressing     Problem: Skin Injury Risk Increased  Goal: Skin Health and Integrity  Outcome: Ongoing, Progressing     Problem: Diabetes Comorbidity  Goal: Blood Glucose Level Within Targeted Range  Outcome: Ongoing, Progressing     Problem: Device-Related Complication Risk (Hemodialysis)  Goal: Safe, Effective Therapy Delivery  Outcome: Ongoing, Progressing     Problem: Hemodynamic Instability (Hemodialysis)  Goal: Effective Tissue Perfusion  Outcome: Ongoing, Progressing     Problem: Infection (Hemodialysis)  Goal: Absence of Infection Signs and Symptoms  Outcome: Ongoing, Progressing     Problem: Adjustment to Illness (Sepsis/Septic Shock)  Goal: Optimal Coping  Outcome: Ongoing, Progressing     Problem: Bleeding (Sepsis/Septic Shock)  Goal: Absence of Bleeding  Outcome: Ongoing, Progressing     Problem: Glycemic Control Impaired (Sepsis/Septic Shock)  Goal: Blood Glucose Level Within Desired Range  Outcome: Ongoing, Progressing     Problem: Infection Progression (Sepsis/Septic Shock)  Goal: Absence of Infection Signs and Symptoms  Outcome: Ongoing, Progressing     Problem: Nutrition Impaired (Sepsis/Septic Shock)  Goal: Optimal Nutrition Intake  Outcome: Ongoing, Progressing     Problem: Electrolyte Imbalance (Chronic Kidney Disease)  Goal: Electrolyte Balance  Outcome: Ongoing, Progressing     Problem: Fluid Volume Excess (Chronic Kidney Disease)  Goal: Fluid Balance  Outcome: Ongoing, Progressing

## 2022-10-14 NOTE — ASSESSMENT & PLAN NOTE
2/2    Outpatient HD Information:  -Dialysis modality: Hemodialysis  -Outpatient HD unit: [facility]   -Nephrologist: Dr. Iker Holden  -HD TX days: Monday/Wednesday/Friday, duration of treatment: 240 min  -Last HD TX prior to hospital admission: 10/10/2022  -Dialysis access: LUE AV fistula   -Vascular surgeon: Bill Maier MD   -Residual urine: Anuric   -EDW: 79 kg      Plan/Recommendations:   -pt is above his dry weight   -iHD today (10/14/2022) with UF -2 to 3L as BP tolerates for metabolic clearance and volume management   -Electrolytes: K 5.1, ensure low K diet/renal diet, should correct with HD  -Mineral & Bone Disorder: DC'ed phosphate binders   Phosphorus level: 3.4   PTH level: 209 pg/mL (9/28/2022)   Calcium level: 7.8 (9.2, corrected)  -ESKD anemia: hgb 7.8, goal hgb 10-11g/dL, 9/28 iron studies reviewed: T. iron 18, TIBC 218, T. sat 8%, ferritin 719, consistent with severe iron deficiency, will reassess need for iron infusions, will trial ESAs with each dialysis session, transfuse if hgb <7  -Renal function panel & Mg level on HD days   -Renal diet with protein supplements each meal, if not NPO   Nephrovitamins daily   -Strict I/O's and daily weights

## 2022-10-14 NOTE — ASSESSMENT & PLAN NOTE
Patient's FSGs are uncontrolled due to hyperglycemia on current medication regimen.  Last A1c reviewed-   Lab Results   Component Value Date    HGBA1C 11.9 (H) 10/12/2022     Most recent fingerstick glucose reviewed-   Recent Labs   Lab 10/13/22  1748 10/13/22  2109 10/14/22  0822 10/14/22  1102   POCTGLUCOSE 130* 165* 261* 224*     Current correctional scale  Medium  Increase anti-hyperglycemic dose as follows-   Antihyperglycemics (From admission, onward)    Start     Stop Route Frequency Ordered    10/12/22 2100  insulin detemir U-100 pen 15 Units         -- SubQ 2 times daily 10/12/22 1501    10/12/22 1645  insulin aspart U-100 pen 10 Units         -- SubQ 3 times daily with meals 10/12/22 1501    10/12/22 0718  insulin aspart U-100 pen 1-10 Units         -- SubQ Before meals & nightly PRN 10/12/22 0619        Hold Oral hypoglycemics while patient is in the hospital.  -- current regimen: Detemir 15 BID; Aspart 10 TIDWM

## 2022-10-14 NOTE — ASSESSMENT & PLAN NOTE
I have printed  pt form filled out and emailed to [facility].Veda ORNELAS,CMA   Mr. Lewis is a 56 y/o male NH resident, HLD, HTN, COPD on O2, ESRD on HD presents to ED for LLE swelling and cellulitis. U/s negative for LLE DVT. CTA +pulmonary embolism and bilateral pleural effusions and ascites. Blood cultures on admit +MSSA and viridan strep. ID consulted for abx mgmt.     Recommendations  1. D/c vancomycin. Start cefazolin as with MSSA and viridan strep. Repeat blood cultures NGTD  2. General surgery following, no evidence of compartment syndrome or necrotizing fasciitis. CT studies with myositis. No abscesses.   3. Continue judicial leg elevation ankle>knee. Discussed with nursing. Leg swelling much improved today  4. TTE studies. Consider VIANNEY if with persistent bacteremia.   5. ID will follow closely with you     Discussed with ID staff.

## 2022-10-14 NOTE — PROGRESS NOTES
[facility] - Intensive Care ([place] [address])  Infectious Disease  Progress Note    Patient Name: Cosme Lewis  MRN: [id number]  Admission Date: 10/11/2022  Length of Stay: 2 days  Attending Physician: Odessa Borges DO  Primary Care Provider: Primary Doctor No    Isolation Status: No active isolations  Assessment/Plan:      Gram-positive bacteremia  Mr. Lewis is a 56 y/o male NH resident, HLD, HTN, COPD on O2, ESRD on HD presents to ED for LLE swelling and cellulitis. U/s negative for LLE DVT. CTA +pulmonary embolism and bilateral pleural effusions and ascites. Blood cultures on admit +MSSA and viridan strep. ID consulted for abx mgmt.     Recommendations  1. D/c vancomycin. Start cefazolin as with MSSA and viridan strep. Repeat blood cultures NGTD  2. General surgery following, no evidence of compartment syndrome or necrotizing fasciitis. CT studies with myositis. No abscesses.   3. Continue judicial leg elevation ankle>knee. Discussed with nursing. Leg swelling much improved today  4. TTE studies. Consider VIANNEY if with persistent bacteremia.   5. ID will follow closely with you     Discussed with ID staff.         Thank you for your consult. I will follow-up with patient. Please contact us if you have any additional questions.    Eduard Landry PA-C  Infectious Disease  [facility] - Intensive Care ([place] [address])    Subjective:     Principal Problem:Cellulitis of left lower extremity    HPI: Mr. Lewis is a 56 y/o male NH resident, HLD, HTN, COPDESRD on HD presents to ED for LLE swelling and cellulitis. Denies having any fever chills or night sweats. Seen by podiatry, no acute intervention at this time. CTA +pulmonary embolism and bilateral pleural effusions and ascites. Blood cultures on admit +Staph aureus and viridan strep. He was ambulatory prior to this.     Interval History:   General surgery following, no evidence of compartment syndrome or necrotizing fasciitis. LLE swelling muhc improved. Repeat blood culturse NGTD. HD  today. Moving his legs better    Review of Systems   Constitutional:  Negative for activity change, appetite change, chills, diaphoresis and fever.   Respiratory:  Negative for cough and shortness of breath.    Cardiovascular:  Positive for leg swelling.   Gastrointestinal:  Negative for abdominal pain, diarrhea, nausea and vomiting.   Genitourinary:  Negative for dysuria.   Musculoskeletal:  Negative for back pain.   Skin:  Negative for color change, pallor, rash and wound.   Neurological:  Negative for dizziness.   Objective:     Vital Signs (Most Recent):  Temp: 98.7 °F (37.1 °C) (10/14/22 1234)  Pulse: 75 (10/14/22 1242)  Resp: 20 (10/14/22 1242)  BP: (!) 170/81 (10/14/22 1234)  SpO2: 95 % (10/14/22 1242)   Vital Signs (24h Range):  Temp:  [97.7 °F (36.5 °C)-98.7 °F (37.1 °C)] 98.7 °F (37.1 °C)  Pulse:  [64-82] 75  Resp:  [16-24] 20  SpO2:  [90 %-98 %] 95 %  BP: (124-176)/(67-99) 170/81     Weight: 93 kg (205 lb)  Body mass index is 30.27 kg/m².    Estimated Creatinine Clearance: 17.6 mL/min (A) (based on SCr of 5.1 mg/dL (H)).    Physical Exam  Vitals and nursing note reviewed.   Constitutional:       General: He is not in acute distress.     Appearance: He is well-developed. He is not diaphoretic.   HENT:      Head: Normocephalic and atraumatic.   Eyes:      Pupils: Pupils are equal, round, and reactive to light.   Cardiovascular:      Rate and Rhythm: Normal rate and regular rhythm.      Heart sounds: Normal heart sounds. No murmur heard.    No friction rub. No gallop.   Pulmonary:      Effort: Pulmonary effort is normal. No respiratory distress.      Breath sounds: Normal breath sounds. No wheezing or rales.   Chest:      Chest wall: No tenderness.   Abdominal:      General: Bowel sounds are normal. There is no distension.      Palpations: There is no mass.      Tenderness: There is no abdominal tenderness. There is no guarding.   Musculoskeletal:         General: Swelling (improving) and tenderness present.  No deformity. Normal range of motion.      Cervical back: Normal range of motion and neck supple.      Left lower leg: Edema present.      Comments: More ambulatory today   Skin:     General: Skin is warm and dry.      Findings: Erythema present. No rash.   Neurological:      Mental Status: He is alert and oriented to person, place, and time.   Psychiatric:         Behavior: Behavior normal.         Thought Content: Thought content normal.         Judgment: Judgment normal.       Significant Labs: All pertinent labs within the past 24 hours have been reviewed.    Significant Imaging: I have reviewed all pertinent imaging results/findings within the past 24 hours.

## 2022-10-14 NOTE — ASSESSMENT & PLAN NOTE
Patient admitted initially for suspicion of left lower extremity cellulitis and possible osteomyelitis. LLE U/S negative for DVT. Foot XR not concerning for acute pathology. CT ordered with concerns for nec fasc. Gen surg consulted, after exam report low concern for nec fasc or compartment syndrome. Transitioned Vanc to Cefazolin on 10/14.      - Continue cefazolin  - Podiatry and ID following; appreciate recommendations

## 2022-10-14 NOTE — SUBJECTIVE & OBJECTIVE
Interval History:   Admitted on 10/11/2022 for L leg pain and swelling, being treated for LLE cellulitis, bacteremia, acute PE, HF exacerbation, and for vol overload    General surgery eval pt for compartment syndrome-->neg   Pt arrived in stable condition, he states that he's very hungry.   Complained of shortness of breath, nurse examined pt-->pt was chewing on nasal canula->adjusted NC correctly and sats >97 % and no furher complaints of shortness of breath.      Last iHD 10/12/2022 net UF -2.5L      Review of patient's allergies indicates:  No Known Allergies  Current Facility-Administered Medications   Medication Frequency    0.9%  NaCl infusion Once    albuterol-ipratropium 2.5 mg-0.5 mg/3 mL nebulizer solution 3 mL Q4H    atorvastatin tablet 40 mg Daily    carvediloL tablet 3.125 mg BID    cetirizine tablet 10 mg Daily    cloNIDine tablet 0.3 mg TID    dextrose 10% bolus 125 mL PRN    dextrose 10% bolus 250 mL PRN    FLUoxetine capsule 40 mg Daily    fluticasone furoate-vilanteroL 100-25 mcg/dose diskus inhaler 1 puff Daily    glucagon (human recombinant) injection 1 mg PRN    glucose chewable tablet 16 g PRN    glucose chewable tablet 24 g PRN    heparin 25,000 units in dextrose 5% (100 units/ml) IV bolus from bag - ADDITIONAL PRN BOLUS - 30 units/kg PRN    heparin 25,000 units in dextrose 5% (100 units/ml) IV bolus from bag - ADDITIONAL PRN BOLUS - 60 units/kg PRN    heparin 25,000 units in dextrose 5% 250 mL (100 units/mL) infusion HIGH INTENSITY nomogram Anti- Xa Continuous    hydrALAZINE tablet 25 mg Q8H PRN    insulin aspart U-100 pen 1-10 Units QID (AC + HS) PRN    insulin aspart U-100 pen 10 Units TIDWM    insulin detemir U-100 pen 15 Units BID    isosorbide mononitrate 24 hr tablet 30 mg Daily    NIFEdipine 24 hr tablet 60 mg Daily    oxyCODONE-acetaminophen 5-325 mg per tablet 1 tablet Q4H PRN    sevelamer carbonate tablet 800 mg TID WM    tiotropium bromide 2.5 mcg/actuation inhaler 2 puff Daily     vancomycin - pharmacy to dose pharmacy to manage frequency       Objective:     Vital Signs (Most Recent):  Temp: 97.8 °F (36.6 °C) (10/14/22 0336)  Pulse: 76 (10/14/22 0853)  Resp: 19 (10/14/22 0853)  BP: 138/78 (10/14/22 0336)  SpO2: 97 % (10/14/22 0853)  O2 Device (Oxygen Therapy): nasal cannula (10/14/22 0853) Vital Signs (24h Range):  Temp:  [97.5 °F (36.4 °C)-98.3 °F (36.8 °C)] 97.8 °F (36.6 °C)  Pulse:  [64-76] 76  Resp:  [16-24] 19  SpO2:  [90 %-98 %] 97 %  BP: (124-170)/(67-82) 138/78     Weight: 93 kg (205 lb) (10/12/22 1116)  Body mass index is 30.27 kg/m².  Body surface area is 2.13 meters squared.    I/O last 3 completed shifts:  In: 476 [P.O.:476]  Out: -     Physical Exam  Vitals and nursing note reviewed.   Constitutional:       General: He is not in acute distress.     Appearance: He is ill-appearing. He is not toxic-appearing or diaphoretic.   HENT:      Mouth/Throat:      Mouth: Mucous membranes are moist.   Cardiovascular:      Rate and Rhythm: Normal rate and regular rhythm.      Pulses: Normal pulses.      Heart sounds: Murmur heard.      Comments: L UE AVF +thrill & bruit    Pulmonary:      Effort: Pulmonary effort is normal. No respiratory distress.      Breath sounds: No stridor. Rales present. No wheezing or rhonchi.      Comments: On 2L NC  Abdominal:      General: Abdomen is flat. Bowel sounds are normal. There is no distension.      Palpations: Abdomen is soft. There is no mass.      Tenderness: There is no abdominal tenderness. There is no guarding or rebound.      Hernia: No hernia is present.   Musculoskeletal:         General: Swelling present. No tenderness, deformity or signs of injury.      Right lower leg: Edema present.      Left lower leg: Edema present.      Comments: 3+ pitting edema on L leg and 2+ in R Leg   Skin:     General: Skin is warm.      Capillary Refill: Capillary refill takes less than 2 seconds.      Coloration: Skin is not jaundiced or pale.      Findings:  Lesion present. No bruising, erythema or rash.      Comments: L lower leg with lesion covered w/ dressing->mild bleeding noted through dressing   Neurological:      Mental Status: He is alert and oriented to person, place, and time.   Psychiatric:         Mood and Affect: Mood normal.         Behavior: Behavior normal.         Thought Content: Thought content normal.         Judgment: Judgment normal.       Significant Labs:  CBC:   Recent Labs   Lab 10/14/22  0149   WBC 5.58   RBC 2.81*   HGB 7.8*   HCT 25.7*      MCV 92   MCH 27.8   MCHC 30.4*     CMP:   Recent Labs   Lab 10/14/22  0149   *   CALCIUM 7.8*   ALBUMIN 2.3*   PROT 6.0   *   K 5.1   CO2 25      BUN 32*   CREATININE 5.1*   ALKPHOS 217*   ALT 13   AST 21   BILITOT 0.5     All labs within the past 24 hours have been reviewed.     Significant Imaging:  Labs: Reviewed  CT: Reviewed

## 2022-10-14 NOTE — PROGRESS NOTES
Patient arrived via bed.  Awake, alert and responsive.  No distress noted.  HD TX  (MWF) started via UMA AV fistula.

## 2022-10-14 NOTE — PROGRESS NOTES
HD TX completed.  Ran for 3.5 hrs.  Net fluid removed is 3 liters.   VSS.   Tolerated dialysis well.  Report given to primary nurse.  Returned to floor via bed.

## 2022-10-14 NOTE — PROGRESS NOTES
[facility] - Intensive Care ([place] [id number])  Nephrology  Progress Note    Patient Name: Cosme Lewis  MRN: [id number]  Admission Date: 10/11/2022  Hospital Length of Stay: 2 days  Attending Provider: Odessa Borges DO   Primary Care Physician: Primary Doctor No  Principal Problem:Cellulitis of left lower extremity    Subjective:     HPI: Cosme Lewis is a 57 year old male with PMHx significant for ESRD (on dialysis MWF), HTN, and COPD who presents from nursing home for evaluation of acute and worsening left lower extremity swelling, which began 3 days ago. Patient last dialyzed on 10/10/22. Patient is anuric at baseline. Denies CP, fever, cough, SOB, aches, chills, N/V/D, and abdominal pain. Nephrology was consulted for management of ESRD/HD.       Interval History:   Admitted on 10/11/2022 for L leg pain and swelling, being treated for LLE cellulitis, bacteremia, acute PE, HF exacerbation, and for vol overload    General surgery eval pt for compartment syndrome-->neg   Pt arrived in stable condition, he states that he's very hungry.   Complained of shortness of breath, nurse examined pt-->pt was chewing on nasal canula->adjusted NC correctly and sats >97 % and no furher complaints of shortness of breath.      Last iHD 10/12/2022 net UF -2.5L      Review of patient's allergies indicates:  No Known Allergies  Current Facility-Administered Medications   Medication Frequency    0.9%  NaCl infusion Once    albuterol-ipratropium 2.5 mg-0.5 mg/3 mL nebulizer solution 3 mL Q4H    atorvastatin tablet 40 mg Daily    carvediloL tablet 3.125 mg BID    cetirizine tablet 10 mg Daily    cloNIDine tablet 0.3 mg TID    dextrose 10% bolus 125 mL PRN    dextrose 10% bolus 250 mL PRN    FLUoxetine capsule 40 mg Daily    fluticasone furoate-vilanteroL 100-25 mcg/dose diskus inhaler 1 puff Daily    glucagon (human recombinant) injection 1 mg PRN    glucose chewable tablet 16 g PRN    glucose chewable tablet 24 g PRN    heparin 25,000  units in dextrose 5% (100 units/ml) IV bolus from bag - ADDITIONAL PRN BOLUS - 30 units/kg PRN    heparin 25,000 units in dextrose 5% (100 units/ml) IV bolus from bag - ADDITIONAL PRN BOLUS - 60 units/kg PRN    heparin 25,000 units in dextrose 5% 250 mL (100 units/mL) infusion HIGH INTENSITY nomogram Anti- Xa Continuous    hydrALAZINE tablet 25 mg Q8H PRN    insulin aspart U-100 pen 1-10 Units QID (AC + HS) PRN    insulin aspart U-100 pen 10 Units TIDWM    insulin detemir U-100 pen 15 Units BID    isosorbide mononitrate 24 hr tablet 30 mg Daily    NIFEdipine 24 hr tablet 60 mg Daily    oxyCODONE-acetaminophen 5-325 mg per tablet 1 tablet Q4H PRN    sevelamer carbonate tablet 800 mg TID WM    tiotropium bromide 2.5 mcg/actuation inhaler 2 puff Daily    vancomycin - pharmacy to dose pharmacy to manage frequency       Objective:     Vital Signs (Most Recent):  Temp: 97.8 °F (36.6 °C) (10/14/22 0336)  Pulse: 76 (10/14/22 0853)  Resp: 19 (10/14/22 0853)  BP: 138/78 (10/14/22 0336)  SpO2: 97 % (10/14/22 0853)  O2 Device (Oxygen Therapy): nasal cannula (10/14/22 0853) Vital Signs (24h Range):  Temp:  [97.5 °F (36.4 °C)-98.3 °F (36.8 °C)] 97.8 °F (36.6 °C)  Pulse:  [64-76] 76  Resp:  [16-24] 19  SpO2:  [90 %-98 %] 97 %  BP: (124-170)/(67-82) 138/78     Weight: 93 kg (205 lb) (10/12/22 1116)  Body mass index is 30.27 kg/m².  Body surface area is 2.13 meters squared.    I/O last 3 completed shifts:  In: 476 [P.O.:476]  Out: -     Physical Exam  Vitals and nursing note reviewed.   Constitutional:       General: He is not in acute distress.     Appearance: He is ill-appearing. He is not toxic-appearing or diaphoretic.   HENT:      Mouth/Throat:      Mouth: Mucous membranes are moist.   Cardiovascular:      Rate and Rhythm: Normal rate and regular rhythm.      Pulses: Normal pulses.      Heart sounds: Murmur heard.      Comments: L UE AVF +thrill & bruit    Pulmonary:      Effort: Pulmonary effort is normal. No  respiratory distress.      Breath sounds: No stridor. Rales present. No wheezing or rhonchi.      Comments: On 2L NC  Abdominal:      General: Abdomen is flat. Bowel sounds are normal. There is no distension.      Palpations: Abdomen is soft. There is no mass.      Tenderness: There is no abdominal tenderness. There is no guarding or rebound.      Hernia: No hernia is present.   Musculoskeletal:         General: Swelling present. No tenderness, deformity or signs of injury.      Right lower leg: Edema present.      Left lower leg: Edema present.      Comments: 3+ pitting edema on L leg and 2+ in R Leg   Skin:     General: Skin is warm.      Capillary Refill: Capillary refill takes less than 2 seconds.      Coloration: Skin is not jaundiced or pale.      Findings: Lesion present. No bruising, erythema or rash.      Comments: L lower leg with lesion covered w/ dressing->mild bleeding noted through dressing   Neurological:      Mental Status: He is alert and oriented to person, place, and time.   Psychiatric:         Mood and Affect: Mood normal.         Behavior: Behavior normal.         Thought Content: Thought content normal.         Judgment: Judgment normal.       Significant Labs:  CBC:   Recent Labs   Lab 10/14/22  0149   WBC 5.58   RBC 2.81*   HGB 7.8*   HCT 25.7*      MCV 92   MCH 27.8   MCHC 30.4*     CMP:   Recent Labs   Lab 10/14/22  0149   *   CALCIUM 7.8*   ALBUMIN 2.3*   PROT 6.0   *   K 5.1   CO2 25      BUN 32*   CREATININE 5.1*   ALKPHOS 217*   ALT 13   AST 21   BILITOT 0.5     All labs within the past 24 hours have been reviewed.     Significant Imaging:  Labs: Reviewed  CT: Reviewed    Assessment/Plan:     * Cellulitis of left lower extremity  Vancomycin, Random   Date Value Ref Range Status   10/14/2022 17.0 Not established ug/mL Final       -Plan per primary team       Leg wound, left  -Plan per primary team       Acute HFrEF (heart failure with reduced ejection  fraction)  Results for orders placed during the hospital encounter of 10/11/22    Echo    Interpretation Summary  · Severe left atrial enlargement.  · The left ventricle is normal in size with concentric hypertrophy and mildly decreased systolic function.  · The estimated ejection fraction is 40%.  · The left ventricular global longitudinal strain is -12%.  · Grade II left ventricular diastolic dysfunction.  · Mild right ventricular enlargement with mildly reduced right ventricular systolic function.  · Mild aortic regurgitation.  · Mild mitral regurgitation.  · Mild tricuspid regurgitation.  · Elevated central venous pressure (15 mmHg).  · There is pulmonary hypertension. The estimated PA systolic pressure is 65 mmHg.  · Small circumferential pericardial effusion.  · There is a left pleural effusion.  · Mild right atrial enlargement.      -will continue to assist with vol removal  -Plan per primary team       Gram-positive bacteremia  On vancomycin-follow levels closely  -Plan per primary team       Type 1 diabetes mellitus with chronic kidney disease on chronic dialysis  Lab Results   Component Value Date    HGBA1C 11.9 (H) 10/12/2022     -Plan per primary team       ESRD on dialysis  2/2    Outpatient HD Information:  -Dialysis modality: Hemodialysis  -Outpatient HD unit: [facility]   -Nephrologist: Dr. Iker Holden  -HD TX days: Monday/Wednesday/Friday, duration of treatment: 240 min  -Last HD TX prior to hospital admission: 10/10/2022  -Dialysis access: LUE AV fistula   -Vascular surgeon: Bill Maier MD   -Residual urine: Anuric   -EDW: 79 kg      Plan/Recommendations:   -pt is above his dry weight   -iHD today (10/14/2022) with UF -2 to 3L as BP tolerates for metabolic clearance and volume management   -Electrolytes: K 5.1, ensure low K diet/renal diet, should correct with HD  -Mineral & Bone Disorder: DC'ed phosphate binders   Phosphorus level: 3.4   PTH level: 209 pg/mL (9/28/2022)   Calcium  level: 7.8 (9.2, corrected)  -ESKD anemia: hgb 7.8, goal hgb 10-11g/dL, 9/28 iron studies reviewed: T. iron 18, TIBC 218, T. sat 8%, ferritin 719, consistent with severe iron deficiency, will reassess need for iron infusions, will trial ESAs with each dialysis session, transfuse if hgb <7  -Renal function panel & Mg level on HD days   -Renal diet with protein supplements each meal, if not NPO   Nephrovitamins daily   -Strict I/O's and daily weights    Acute on chronic respiratory failure with hypoxia  On 5L O2 NC  Will assist with volume removal as tolerated         Thank you for your consult. I will follow-up with patient. Please contact us if you have any additional questions.    Shagufta Campos MD  Nephrology  [facility] - [facility] ([place])

## 2022-10-14 NOTE — CARE UPDATE
RAPID RESPONSE NURSE ROUND       Rounding completed with charge RNRubi  No additional concerns verbalized at this time. Instructed to call [phone] for further concerns or assistance.

## 2022-10-14 NOTE — ASSESSMENT & PLAN NOTE
Patient with history COPD treated with Breo, albuterol. Tachypneic in the ED saturating 94% and 6L on nasal cannula. Uses 2L O2 at home. Found to have multiple PE's on chest CT. Started on heparin. Currently appropriately on 5L    - DOAC for PE  - Continue home inhalers  - Will need outpatient pulmonology follow up for COPD  - Possible cardiogenic contribution given acute drop in EF

## 2022-10-14 NOTE — CONSULTS
[facility] - [facility] ([place] [address])  General Surgery  Consult Note    Patient Name: Cosme Lewis  MRN: [id number]  Code Status: Full Code  Admission Date: 10/11/2022  Hospital Length of Stay: 1 days  Attending Physician: Hal Streeter MD  Primary Care Provider: Primary Doctor No    Patient information was obtained from patient, past medical records and ER records.     Inpatient consult to General Surgery  Consult performed by: ABDULKADIR Blanc MD  Consult ordered by: Hal Streeter MD  Reason for consult: compartment syndrome        Subjective:     Principal Problem: Cellulitis of left lower extremity    History of Present Illness: 58 yo M with T1DM, ESRD on HD (MWF), HTN, HFpEF (EF 55%), HLD, chronic hypoxemic resp failure 2/2 COPD (on home O2), h/o DVT/PE (not currently on AC) admitted from NH for evaluation of L leg pain + swelling x 2 days.    He was found to have PE and put on heparin gtt.    General surgery consulted to evaluate for compartment syndrome of left lower extremity. He has had persistent pain and swelling for the last two days. It has not improved. It is painful to touch and with passive flexion. He has preserved sensation and motor function, though it feels a little weaker on the left side. He is moving it spontaneously in bed without significant discomfort. He has erythema up to his thigh.          No current facility-administered medications on file prior to encounter.     Current Outpatient Medications on File Prior to Encounter   Medication Sig    acetaminophen (TYLENOL) 650 MG TbSR Take 650 mg by mouth every 8 (eight) hours as needed (pain or fever over 100.4).    albuterol (PROVENTIL/VENTOLIN HFA) 90 mcg/actuation inhaler Inhale 2 puffs into the lungs every 6 (six) hours as needed for Wheezing or Shortness of Breath.    aspirin (ECOTRIN) 81 MG EC tablet Take 81 mg by mouth once daily.    calcium acetate,phosphat bind, (PHOSLO) 667 mg capsule Take 667 mg by mouth 3 (three) times daily.     cetirizine (ZYRTEC) 10 MG tablet Take 10 mg by mouth daily as needed (itching).    cloNIDine 0.3 mg/24 hr td ptwk (CATAPRES) 0.3 mg/24 hr Place 1 patch onto the skin every Saturday.    FLUoxetine 40 MG capsule Take 40 mg by mouth once daily.    fluticasone-salmeterol diskus inhaler 250-50 mcg Inhale 1 puff into the lungs 2 (two) times daily.    GLUCAGON EMERGENCY KIT, HUMAN, 1 mg injection 1 mg into the muscle as needed if CBG < 70    hydrALAZINE (APRESOLINE) 100 MG tablet Take 100 mg by mouth 3 (three) times daily as needed (blood pressure  greater than 190/100).    HYDROPHILIC CREAM TOP Apply topically. Apply liberal amount to affected area twice daily for dry skin    hydrOXYzine HCL (ATARAX) 10 MG Tab Take 10 mg by mouth 3 (three) times daily as needed (itching).    insulin aspart (NOVOLOG FLEXPEN U-100 INSULIN SUBQ) Inject into the skin before meals and at bedtime per sliding scale: 0-60= OJ or glucose tab; = 0; 201-250= 2; 251-300= 4; 301-350= 6; 351-400= 8; greater than 400= 10 units then call MD    insulin aspart U-100 (NOVOLOG) 100 unit/mL (3 mL) InPn pen Inject 8 units into the skin with breakfast and lunch & 10 units with dinner    isosorbide mononitrate (IMDUR) 30 MG 24 hr tablet Take 30 mg by mouth 2 (two) times daily.    lisinopriL (PRINIVIL,ZESTRIL) 40 MG tablet Take 40 mg by mouth once daily.    melatonin 3 mg TbDL Take 9 mg by mouth nightly as needed (sleep).    NIFEdipine (ADALAT CC) 60 MG TbSR Take 120 mg by mouth every evening.    OLANZapine (ZYPREXA) 5 MG tablet Take 5 mg by mouth daily as needed (restlessness/ agitation).    oxymetazoline (AFRIN) 0.05 % nasal spray 2 sprays by Nasal route 2 (two) times daily as needed for Congestion.    rosuvastatin (CRESTOR) 20 MG tablet Take 20 mg by mouth every evening.    sevelamer carbonate (RENVELA) 800 mg Tab Take 800 mg by mouth 3 (three) times daily.    sodium chloride (OCEAN) 0.65 % nasal spray 2 sprays by Nasal route every  4 (four) hours as needed for Congestion.    tiotropium bromide (SPIRIVA RESPIMAT) 2.5 mcg/actuation inhaler Inhale 2 puffs into the lungs Daily.    triamcinolone acetonide 0.025 % Lotn Apply topically. Apply to the affected area twice daily    vitamin renal formula, B-complex-vitamin c-folic acid, (NEPHROCAP) 1 mg Cap Take 1 capsule by mouth once daily.    insulin detemir U-100 (LEVEMIR FLEXTOUCH) 100 unit/mL (3 mL) SubQ InPn pen Inject 12 units into the skin every morning & 6 units at bedtime       Review of patient's allergies indicates:  No Known Allergies    Past Medical History:   Diagnosis Date    Chronic kidney disease-mineral and bone disorder 10/12/2022    COPD (chronic obstructive pulmonary disease)     Diabetes mellitus type 1     ESRD on dialysis     Hypertension     SOB (shortness of breath) 10/12/2022    Patient with history COPD treated with Ellipta the albuterol, fluticasone-salmeterol tachypneic in the ED saturating 94% and 6 L on nasal cannula, patient does not know how many  he uses it is in nursing home.    -duo nebs Q 4  Given that patient is a poor historian, and in the setting of left lower extremity edema and history of coagulopathy PE cannot be ruled out.  Will workup for possible infec     Past Surgical History:   Procedure Laterality Date    AV FISTULA PLACEMENT       Family History       Problem Relation (Age of Onset)    Diabetes Mother          Tobacco Use    Smoking status: Never    Smokeless tobacco: Never   Substance and Sexual Activity    Alcohol use: Never    Drug use: Not on file    Sexual activity: Not on file     Review of Systems   Constitutional:  Positive for activity change and appetite change. Negative for fever.   HENT: Negative.     Eyes: Negative.    Respiratory: Negative.     Cardiovascular:  Positive for chest pain and leg swelling.   Gastrointestinal: Negative.    Endocrine: Negative.    Genitourinary: Negative.    Musculoskeletal:  Positive for  myalgias.   Skin:  Positive for color change and rash.   Allergic/Immunologic: Negative.    Neurological: Negative.    Hematological: Negative.    Psychiatric/Behavioral: Negative.     Objective:     Vital Signs (Most Recent):  Temp: 98.2 °F (36.8 °C) (10/13/22 1915)  Pulse: 72 (10/13/22 1915)  Resp: 18 (10/13/22 1915)  BP: 124/67 (10/13/22 1915)  SpO2: (!) 92 % (10/13/22 1915)   Vital Signs (24h Range):  Temp:  [97.5 °F (36.4 °C)-98.2 °F (36.8 °C)] 98.2 °F (36.8 °C)  Pulse:  [63-76] 72  Resp:  [12-24] 18  SpO2:  [90 %-98 %] 92 %  BP: (109-170)/(63-82) 124/67     Weight: 93 kg (205 lb)  Body mass index is 30.27 kg/m².    Physical Exam  Vitals reviewed.   Constitutional:       Appearance: Normal appearance.   HENT:      Head: Normocephalic.   Cardiovascular:      Rate and Rhythm: Normal rate.   Pulmonary:      Effort: Pulmonary effort is normal. No respiratory distress.   Abdominal:      General: Abdomen is flat. There is no distension.      Tenderness: There is no abdominal tenderness.      Hernia: No hernia is present.   Skin:     General: Skin is warm and dry.      Capillary Refill: Capillary refill takes less than 2 seconds.      Comments: Left lower extremity with swelling and induration up to the thigh  Tender to palpation  Pain with passive flexion  Intact sensation  Strength is less on the left, but seems limited due to pain  2+ right DP  1+ left DP   Neurological:      General: No focal deficit present.      Mental Status: He is alert.   Psychiatric:         Mood and Affect: Mood normal.       Significant Labs:  I have reviewed all pertinent lab results within the past 24 hours.  CBC:   Recent Labs   Lab 10/13/22  0322   WBC 4.55   RBC 2.90*   HGB 8.1*   HCT 26.5*      MCV 91   MCH 27.9   MCHC 30.6*     CMP:   Recent Labs   Lab 10/13/22  0322   GLU 81   CALCIUM 8.1*   ALBUMIN 2.4*   PROT 6.3   *   K 4.7   CO2 22*      BUN 22*   CREATININE 3.8*   ALKPHOS 215*   ALT 11   AST 19   BILITOT 0.5          Significant Diagnostics:  I have reviewed all pertinent imaging results/findings within the past 24 hours.    Pending arterial US and CT leg      Assessment/Plan:     * Cellulitis of left lower extremity  57M with above comorbidities with left lower extremity cellulitis up to his thigh. Since admission has been improving, but his leg pain is persistent.  Concern for compartment syndrome.  He has 1+ pulse on the left, intact sensation, slightly decreased strength (but seems limited by pain) and pain with passive dorsiflexion in the gastroc.  CK is 137 (normal)    With two days of pain and normal CK in a patient with ESRD, my suspicion for compartment syndrome is low. If he had compartment, I would expect CK to be markedly elevated.    However, his clinical exam is concerning so I obtained compartment pressures with the Mac needle.  They were:  Lateral: 10 mm Hg  Anterior: 9 mm Hg  Superficial posterior: 12 mm Hg  Deep posterior: 19 mm Hg    He does not have compartment syndrome.    Recommend continued antibiotics.    Will follow.        VTE Risk Mitigation (From admission, onward)         Ordered     heparin 25,000 units in dextrose 5% (100 units/ml) IV bolus from bag - ADDITIONAL PRN BOLUS - 60 units/kg  As needed (PRN)        Question:  Heparin Infusion Adjustment (DO NOT MODIFY ANSWER)  Answer:  [email or web address]\Images\Pharmacy\HeparinInfusions\heparin HIGH INTENSITY nomogram with ANTI-XA [id number]    10/12/22 1440     heparin 25,000 units in dextrose 5% (100 units/ml) IV bolus from bag - ADDITIONAL PRN BOLUS - 30 units/kg  As needed (PRN)        Question:  Heparin Infusion Adjustment (DO NOT MODIFY ANSWER)  Answer:  [email or web address]\Images\Pharmacy\HeparinInfusions\heparin HIGH INTENSITY nomogram with ANTI-XA [id number]    10/12/22 1440     heparin 25,000 units in dextrose 5% 250 mL (100 units/mL) infusion HIGH INTENSITY nomogram Anti- Xa  Continuous        Question Answer Comment   Heparin  Infusion Adjustment (DO NOT MODIFY ANSWER) [email or web address]\[email or web address]\Images\Pharmacy\HeparinInfusions\heparin HIGH INTENSITY nomogram with ANTI-XA [id number]    Begin at (in units/kg/hr) 18        10/12/22 1440     IP VTE HIGH RISK PATIENT  Once         10/12/22 0423                Thank you for your consult. I will follow-up with patient. Please contact us if you have any additional questions.    PHILOMENA Blanc MD  General Surgery  [facility] - Intensive Care ([place]-[facility])

## 2022-10-14 NOTE — PT/OT/SLP PROGRESS
"Physical Therapy      Patient Name:  Cosme Lewis   MRN:  [id number]    PT to bedside for PT evaluation x2 attempts. Upon first attempt, pt was out of room for dialysis. Upon second attempt in PM, pt found semi-reclined with lines intact. Pt appeared agitated and disinterested in therapy despite max reinforcement and encouragement. PT provided pt education about importance of OOB mobility, however pt was not receptive. Pt stated "give me one more day". PT responded "Doctors want you to get up. I've been trying to work with you since yesterday; all I ask is for you to try". Pt stated "Now you won't get anymore days". Pt requested to discontinue therapy services. PT to sign off. Please reconsult if status changes or willing to participate.    Time in 1457  Time out 1500    "

## 2022-10-14 NOTE — ASSESSMENT & PLAN NOTE
Patient with recent echo at OSH with EF > 50%. Echo with EF on 40% during this admission. Possibly 2/2 acute illness. Will need cardiology follow up outpatient for possible evaluation.    Echo:   Severe left atrial enlargement.   The left ventricle is normal in size with concentric hypertrophy and mildly decreased systolic function.   The estimated ejection fraction is 40%.   The left ventricular global longitudinal strain is -12%.   Grade II left ventricular diastolic dysfunction.   Mild right ventricular enlargement with mildly reduced right ventricular systolic function.   Mild aortic regurgitation.   Mild mitral regurgitation.   Mild tricuspid regurgitation.   Elevated central venous pressure (15 mmHg).   There is pulmonary hypertension. The estimated PA systolic pressure is 65 mmHg.   Small circumferential pericardial effusion.   There is a left pleural effusion.   Mild right atrial enlargement.      - consider initiation of GDMT once clinically stable and appropriate  - Outpatient Cardiology follow up

## 2022-10-14 NOTE — ASSESSMENT & PLAN NOTE
Positive staph aureus and strep viridens bacteremia on blood cultures    - Continue vancomycin  - Infectious disease consulted, appreciate recs

## 2022-10-14 NOTE — ASSESSMENT & PLAN NOTE
On 2L at baseline     - wean O2 as tolerated, improving with HD  - Continue home Breo and albuterol   - Attempt to maintain sat at 88-92

## 2022-10-14 NOTE — ASSESSMENT & PLAN NOTE
Vancomycin, Random   Date Value Ref Range Status   10/14/2022 17.0 Not established ug/mL Final       -Plan per primary team

## 2022-10-14 NOTE — PROGRESS NOTES
[facility] - Intensive Care ([address] Medicine  Progress Note    Patient Name: Cosme Lewis  MRN: [id number]  Patient Class: IP- Inpatient   Admission Date: 10/11/2022  Length of Stay: 2 days  Attending Physician: Odessa Borges DO  Primary Care Provider: Primary Doctor Rochelle        Subjective:     Principal Problem:Cellulitis of left lower extremity        HPI:  Mr. Lewis is a 57-year-old male, poor historian, difficult to understand presents to the ED with chief complaint of left leg pain and edema.  [facility]. he states that 2 days ago he started noticing swelling of his left leg with associated out of proportion pain that did not respond to NSAIDs.  States that pain is mostly located on his gastrocnemius.  Denies fever, chills, recent trauma or lacerations.  Affirms chronic dry scaly skin.  Osteomyelitis suspected in the ED.   While taking history patient also complained shortness of breath, states he uses oxygen in the nursing home does not know how many Lts.  Denies chest pain, palpitations, right lower extremity edema, testicular edema, cough.  Affirms fatigue, dyspnea of exertion, and is currently sleeping sleeps with 3 pillows.    Denies nausea vomiting, joint pain, constipation or diarrhea, dizziness, vision problems, recent infections, weight loss and loss of appetite.    PMHX:  End-stage renal disease on hemodialysis 3 times a week MWF, last HD 10/10, COPD, HTN, dyslipidemia, insulin-dependent diabetes ( contradictory information found in outside records, unclear if type 1 or type 2), unspecified history of coagulopathy and PE, previously on oral anticoagulation which was discontinued.  Allergies:NKDA  Meds:  Patient does not know name of medications, med rec done with outside records  Surgical:  Av fistula 2 years ago per patient.  Family Hx:  Patient only knows diabetes and hypertension for her mother.  Social:  Patient lives in [facility] with his mother, works as , denies ETOH, denies recreational drug use (cocaine use in outside records) denies tobacco use.  States he eats food he is given in the nursing facility.         Overview/Hospital Course:  Patient presented with complaints of left leg pain and swelling. Noted to have acute on chronic respiratory failure and started on supplemental O2 (on 2L at baseline) HTN emergency, started on home antihypertensives and PRN hydralazine. Severely hyperglycemic to 400's, started on Sub Q insulin regimen. Started on Abx out of concerns of cellulitis and osteomyelitis. No acute process on foot x-ray. Fluid overloaded with BNP > 2000. Underwent dialysis with improvement in BP and volume status. No DVT seen on LE U/S. Multiple PEs seen on CT so started on heparin drip. Found to have MSSA and Strep Viridans bacteremia. Noted to have EF of 40 on Echo (down from previous 55), no valvular vegetations. Cannot initiate GDMT given CKD on dialysis. Switched labetalol to metoprolol. ID consulted given new bacteremia. Patient continued on Vanc per ID recs. Gen surg consulted given concerns for compartment syndrome vs nec fasc on CT of leg; no evidence of either etiology per gen surg; continue abx for cellulitis.       Interval History: NAEON. Patient seen in dialysis. Patient reports continued pain in L leg, though states this has improved. Afebrile, overall feeling better.     Review of Systems   Constitutional:  Positive for appetite change (increased) and fatigue. Negative for chills, diaphoresis and fever.   HENT:  Positive for hearing loss and rhinorrhea Negative for congestion, mouth sores, sore throat and trouble swallowing.    Respiratory:  Positive for shortness of breath. Negative for apnea, choking, chest tightness and wheezing.    Cardiovascular:  Positive for leg swelling. Negative for chest pain and palpitations.   Gastrointestinal:  Positive for abdominal distention. Negative for abdominal  pain, blood in stool, constipation, diarrhea, nausea, rectal pain and vomiting.   Endocrine: Negative for cold intolerance, heat intolerance, polydipsia, polyphagia and polyuria.   Genitourinary:  Negative for difficulty urinating, flank pain, frequency and hematuria.   Musculoskeletal:  Negative for joint swelling, neck pain and neck stiffness.   Allergic/Immunologic: Negative for food allergies.   Neurological:  Negative for seizures, syncope and headaches.   Psychiatric/Behavioral:  Negative for agitation and confusion. The patient is not nervous/anxious.    Objective:     Vital Signs (Most Recent):  Temp: 97.5 °F (36.4 °C) (10/13/22 1235)  Pulse: 68 (10/13/22 1345)  Resp: (!) 23 (10/13/22 1345)  BP: (!) 170/82 (10/13/22 1128)  SpO2: 95 % (10/13/22 1345)   Vital Signs (24h Range):  Temp:  [97.5 °F (36.4 °C)-98.2 °F (36.8 °C)] 97.5 °F (36.4 °C)  Pulse:  [63-79] 68  Resp:  [12-23] 23  SpO2:  [93 %-98 %] 95 %  BP: (109-170)/(63-96) 170/82     Weight: 93 kg (205 lb)  Body mass index is 30.27 kg/m².    Intake/Output Summary (Last 24 hours) at 10/13/2022 1513  Last data filed at 10/13/2022 0022  Gross per 24 hour   Intake 476 ml   Output 3150 ml   Net -2674 ml      Physical Exam  Constitutional:       Appearance: He is not ill-appearing or toxic-appearing.   HENT:      Head: Normocephalic and atraumatic.      Nose: Rhinorrhea present.      Mouth/Throat:      Mouth: Mucous membranes are moist.   Eyes:      Extraocular Movements: Extraocular movements intact.      Pupils: Pupils are equal, round, and reactive to light.   Cardiovascular:      Rate and Rhythm: Regular rhythm. Tachycardia present.      Pulses: Normal pulses.      Heart sounds: No murmur heard.    No friction rub. No gallop.   Pulmonary:      Effort: Respiratory distress present.      Breath sounds: No wheezing or rales.   Abdominal:      General: There is distension.      Palpations: There is no mass.      Tenderness: There is no abdominal tenderness. There  is no guarding or rebound.      Hernia: No hernia is present.   Musculoskeletal:         General: Swelling and tenderness present.      Cervical back: Normal range of motion.      Left lower leg: Edema present.      Comments: LLE tenderness   Neurological:      Mental Status: He is alert.     Significant Labs: All pertinent labs within the past 24 hours have been reviewed.  CBC:   Recent Labs   Lab 10/11/22  1848 10/12/22  1907 10/13/22  0322   WBC 6.51 5.79 4.55   HGB 8.9* 8.9* 8.1*   HCT 30.9* 29.9* 26.5*    304 242     CMP:   Recent Labs   Lab 10/11/22  1848 10/12/22  0435 10/12/22  1033 10/13/22  0322   * 133*  --  133*   K 4.9 4.4  --  4.7   CL 91* 93*  --  102   CO2 24 27  --  22*   * 447*  --  81   BUN 32* 36*  --  22*   CREATININE 4.5* 5.1*  --  3.8*   CALCIUM 8.5* 8.2*  --  8.1*   PROT 7.4  --  6.4 6.3   ALBUMIN 2.7*  --  2.5* 2.4*   BILITOT 0.8  --  0.6 0.5   ALKPHOS 288*  --  243* 215*   AST 37  --  17 19   ALT 17  --  13 11   ANIONGAP 16 13  --  9       Significant Imaging: I have reviewed all pertinent imaging results/findings within the past 24 hours.      Assessment/Plan:      * Cellulitis of left lower extremity  Patient admitted initially for suspicion of left lower extremity cellulitis and possible osteomyelitis. LLE U/S negative for DVT. Foot XR not concerning for acute pathology. CT ordered with concerns for nec fasc. Gen surg consulted, after exam report low concern for nec fasc or compartment syndrome. Transitioned Vanc to Cefazolin on 10/14.      - Continue cefazolin  - Podiatry and ID following; appreciate recommendations      Leg wound, left  -- c/w Cellulitis  -- low suspicion for Nec Fasc or compartment syndrome at this time  -- on cefazolin      Multiple subsegmental pulmonary emboli without acute cor pulmonale  -- on Eliquis load      Acute HFrEF (heart failure with reduced ejection fraction)  Patient with recent echo at OSH with EF > 50%. Echo with EF on 40% during  this admission. Possibly 2/2 acute illness. Will need cardiology follow up outpatient for possible evaluation.    Echo:   Severe left atrial enlargement.   The left ventricle is normal in size with concentric hypertrophy and mildly decreased systolic function.   The estimated ejection fraction is 40%.   The left ventricular global longitudinal strain is -12%.   Grade II left ventricular diastolic dysfunction.   Mild right ventricular enlargement with mildly reduced right ventricular systolic function.   Mild aortic regurgitation.   Mild mitral regurgitation.   Mild tricuspid regurgitation.   Elevated central venous pressure (15 mmHg).   There is pulmonary hypertension. The estimated PA systolic pressure is 65 mmHg.   Small circumferential pericardial effusion.   There is a left pleural effusion.   Mild right atrial enlargement.      - consider initiation of GDMT once clinically stable and appropriate  - Outpatient Cardiology follow up    Gram-positive bacteremia  Positive staph aureus and strep viridens bacteremia on blood cultures    - Continue vancomycin  - Infectious disease consulted, appreciate recs    Other emphysema  On 2L at baseline     - wean O2 as tolerated, improving with HD  - Continue home Breo and albuterol   - Attempt to maintain sat at 88-92      QT prolongation    - Avoid QT prolonging medications     Type 1 diabetes mellitus with chronic kidney disease on chronic dialysis  Patient's FSGs are uncontrolled due to hyperglycemia on current medication regimen.  Last A1c reviewed-   Lab Results   Component Value Date    HGBA1C 11.9 (H) 10/12/2022     Most recent fingerstick glucose reviewed-   Recent Labs   Lab 10/13/22  1748 10/13/22  2109 10/14/22  0822 10/14/22  1102   POCTGLUCOSE 130* 165* 261* 224*     Current correctional scale  Medium  Increase anti-hyperglycemic dose as follows-   Antihyperglycemics (From admission, onward)    Start     Stop Route Frequency Ordered    10/12/22 2100   insulin detemir U-100 pen 15 Units         -- SubQ 2 times daily 10/12/22 1501    10/12/22 1645  insulin aspart U-100 pen 10 Units         -- SubQ 3 times daily with meals 10/12/22 1501    10/12/22 0718  insulin aspart U-100 pen 1-10 Units         -- SubQ Before meals & nightly PRN 10/12/22 0619        Hold Oral hypoglycemics while patient is in the hospital.  -- current regimen: Detemir 15 BID; Aspart 10 TIDWM    ESRD on dialysis  Hemodialysis MWF    - Nephrology following     Acute on chronic respiratory failure with hypoxia  Patient with history COPD treated with Breo, albuterol. Tachypneic in the ED saturating 94% and 6L on nasal cannula. Uses 2L O2 at home. Found to have multiple PE's on chest CT. Started on heparin. Currently appropriately on 5L    - DOAC for PE  - Continue home inhalers  - Will need outpatient pulmonology follow up for COPD  - Possible cardiogenic contribution given acute drop in EF        VTE Risk Mitigation (From admission, onward)         Ordered     apixaban tablet 5 mg  2 times daily        See Hyperspace for full Linked Orders Report.    10/14/22 1059     apixaban tablet 10 mg  2 times daily        See Hyperspace for full Linked Orders Report.    10/14/22 1059     heparin 25,000 units in dextrose 5% (100 units/ml) IV bolus from bag - ADDITIONAL PRN BOLUS - 60 units/kg  As needed (PRN)        Question:  Heparin Infusion Adjustment (DO NOT MODIFY ANSWER)  Answer:  [email or web address]\epic\Images\Pharmacy\HeparinInfusions\heparin HIGH INTENSITY nomogram with ANTI-XA [id number]    10/12/22 1440     heparin 25,000 units in dextrose 5% (100 units/ml) IV bolus from bag - ADDITIONAL PRN BOLUS - 30 units/kg  As needed (PRN)        Question:  Heparin Infusion Adjustment (DO NOT MODIFY ANSWER)  Answer:  \\[email or web address]\epic\Images\Pharmacy\HeparinInfusions\heparin HIGH INTENSITY nomogram with ANTI-XA [id number]    10/12/22 1440     heparin 25,000 units in dextrose 5% 250 mL (100 units/mL) infusion HIGH  INTENSITY nomogram Anti- Xa  Continuous        Question Answer Comment   Heparin Infusion Adjustment (DO NOT MODIFY ANSWER) \\[email or web address]\epic\Images\Pharmacy\HeparinInfusions\heparin HIGH INTENSITY nomogram with ANTI-XA [id number]    Begin at (in units/kg/hr) 18        10/12/22 1440     IP VTE HIGH RISK PATIENT  Once         10/12/22 0423                Discharge Planning   GERA: 10/15/2022     Code Status: Full Code   Is the patient medically ready for discharge?: No    Reason for patient still in hospital (select all that apply): Treatment                     Carlos Mitchell MD  Department of Hospital Medicine   [facility] - Intensive Care ([place])

## 2022-10-15 VITALS
TEMPERATURE: 98 F | HEART RATE: 73 BPM | RESPIRATION RATE: 20 BRPM | OXYGEN SATURATION: 95 % | BODY MASS INDEX: 30.36 KG/M2 | DIASTOLIC BLOOD PRESSURE: 86 MMHG | HEIGHT: 69 IN | WEIGHT: 205 LBS | SYSTOLIC BLOOD PRESSURE: 148 MMHG

## 2022-10-15 LAB
POCT GLUCOSE: 192 MG/DL (ref 70–110)
POCT GLUCOSE: 246 MG/DL (ref 70–110)

## 2022-10-15 PROCEDURE — 99239 HOSP IP/OBS DSCHRG MGMT >30: CPT | Mod: GC,,, | Performed by: STUDENT IN AN ORGANIZED HEALTH CARE EDUCATION/TRAINING PROGRAM

## 2022-10-15 PROCEDURE — 25000003 PHARM REV CODE 250: Performed by: STUDENT IN AN ORGANIZED HEALTH CARE EDUCATION/TRAINING PROGRAM

## 2022-10-15 PROCEDURE — 94640 AIRWAY INHALATION TREATMENT: CPT

## 2022-10-15 PROCEDURE — 99239 PR HOSPITAL DISCHARGE DAY,>30 MIN: ICD-10-PCS | Mod: GC,,, | Performed by: STUDENT IN AN ORGANIZED HEALTH CARE EDUCATION/TRAINING PROGRAM

## 2022-10-15 PROCEDURE — 99233 SBSQ HOSP IP/OBS HIGH 50: CPT | Mod: ,,, | Performed by: PHYSICIAN ASSISTANT

## 2022-10-15 PROCEDURE — 25000003 PHARM REV CODE 250

## 2022-10-15 PROCEDURE — 94761 N-INVAS EAR/PLS OXIMETRY MLT: CPT

## 2022-10-15 PROCEDURE — 27000221 HC OXYGEN, UP TO 24 HOURS

## 2022-10-15 PROCEDURE — 25000242 PHARM REV CODE 250 ALT 637 W/ HCPCS

## 2022-10-15 PROCEDURE — 63600175 PHARM REV CODE 636 W HCPCS: Performed by: STUDENT IN AN ORGANIZED HEALTH CARE EDUCATION/TRAINING PROGRAM

## 2022-10-15 PROCEDURE — 99233 PR SUBSEQUENT HOSPITAL CARE,LEVL III: ICD-10-PCS | Mod: ,,, | Performed by: PHYSICIAN ASSISTANT

## 2022-10-15 PROCEDURE — 25000242 PHARM REV CODE 250 ALT 637 W/ HCPCS: Performed by: STUDENT IN AN ORGANIZED HEALTH CARE EDUCATION/TRAINING PROGRAM

## 2022-10-15 PROCEDURE — 99900035 HC TECH TIME PER 15 MIN (STAT)

## 2022-10-15 RX ORDER — CEFAZOLIN SODIUM 1 G/50ML
SOLUTION INTRAVENOUS
Qty: 700 ML | Refills: 0 | OUTPATIENT
Start: 2022-10-12 | End: 2022-10-15 | Stop reason: SDUPTHER

## 2022-10-15 RX ORDER — INSULIN ASPART 100 [IU]/ML
10 INJECTION, SOLUTION INTRAVENOUS; SUBCUTANEOUS
Qty: 90 ML | Refills: 0 | Status: SHIPPED | OUTPATIENT
Start: 2022-10-15 | End: 2022-10-15 | Stop reason: SDUPTHER

## 2022-10-15 RX ORDER — CEFAZOLIN SODIUM 1 G/50ML
SOLUTION INTRAVENOUS
Qty: 700 ML | Refills: 0 | OUTPATIENT
Start: 2022-10-15 | End: 2022-10-15 | Stop reason: SDUPTHER

## 2022-10-15 RX ORDER — OXYCODONE AND ACETAMINOPHEN 5; 325 MG/1; MG/1
1 TABLET ORAL EVERY 6 HOURS PRN
Qty: 15 TABLET | Refills: 0 | Status: ON HOLD | OUTPATIENT
Start: 2022-10-15 | End: 2022-01-01 | Stop reason: HOSPADM

## 2022-10-15 RX ORDER — CEFAZOLIN SODIUM 1 G/50ML
SOLUTION INTRAVENOUS
Qty: 700 ML | Refills: 0 | Status: SHIPPED | OUTPATIENT
Start: 2022-10-15 | End: 2022-01-01

## 2022-10-15 RX ORDER — INSULIN ASPART 100 [IU]/ML
10 INJECTION, SOLUTION INTRAVENOUS; SUBCUTANEOUS
Qty: 90 ML | Refills: 0 | Status: ON HOLD | OUTPATIENT
Start: 2022-10-15 | End: 2022-01-01 | Stop reason: HOSPADM

## 2022-10-15 RX ORDER — ATORVASTATIN CALCIUM 40 MG/1
40 TABLET, FILM COATED ORAL DAILY
Qty: 90 TABLET | Refills: 3 | Status: ON HOLD | OUTPATIENT
Start: 2022-10-15 | End: 2023-01-01 | Stop reason: SDUPTHER

## 2022-10-15 RX ORDER — CARVEDILOL 3.12 MG/1
3.12 TABLET ORAL 2 TIMES DAILY
Qty: 60 TABLET | Refills: 11 | Status: ON HOLD | OUTPATIENT
Start: 2022-10-15 | End: 2022-01-01 | Stop reason: SDUPTHER

## 2022-10-15 RX ADMIN — APIXABAN 10 MG: 5 TABLET, FILM COATED ORAL at 08:10

## 2022-10-15 RX ADMIN — INSULIN ASPART 10 UNITS: 100 INJECTION, SOLUTION INTRAVENOUS; SUBCUTANEOUS at 07:10

## 2022-10-15 RX ADMIN — IPRATROPIUM BROMIDE AND ALBUTEROL SULFATE 3 ML: 2.5; .5 SOLUTION RESPIRATORY (INHALATION) at 07:10

## 2022-10-15 RX ADMIN — FLUTICASONE FUROATE AND VILANTEROL TRIFENATATE 1 PUFF: 100; 25 POWDER RESPIRATORY (INHALATION) at 07:10

## 2022-10-15 RX ADMIN — CARVEDILOL 3.12 MG: 3.12 TABLET, FILM COATED ORAL at 08:10

## 2022-10-15 RX ADMIN — INSULIN DETEMIR 15 UNITS: 100 INJECTION, SOLUTION SUBCUTANEOUS at 08:10

## 2022-10-15 RX ADMIN — CLONIDINE HYDROCHLORIDE 0.3 MG: 0.2 TABLET ORAL at 08:10

## 2022-10-15 RX ADMIN — IPRATROPIUM BROMIDE AND ALBUTEROL SULFATE 3 ML: 2.5; .5 SOLUTION RESPIRATORY (INHALATION) at 03:10

## 2022-10-15 RX ADMIN — INSULIN ASPART 4 UNITS: 100 INJECTION, SOLUTION INTRAVENOUS; SUBCUTANEOUS at 12:10

## 2022-10-15 RX ADMIN — NIFEDIPINE 60 MG: 30 TABLET, FILM COATED, EXTENDED RELEASE ORAL at 08:10

## 2022-10-15 RX ADMIN — INSULIN ASPART 10 UNITS: 100 INJECTION, SOLUTION INTRAVENOUS; SUBCUTANEOUS at 12:10

## 2022-10-15 RX ADMIN — ISOSORBIDE MONONITRATE 30 MG: 30 TABLET, EXTENDED RELEASE ORAL at 08:10

## 2022-10-15 RX ADMIN — CETIRIZINE HYDROCHLORIDE 10 MG: 10 TABLET, FILM COATED ORAL at 08:10

## 2022-10-15 RX ADMIN — FLUOXETINE 40 MG: 20 CAPSULE ORAL at 08:10

## 2022-10-15 RX ADMIN — ATORVASTATIN CALCIUM 40 MG: 40 TABLET, FILM COATED ORAL at 08:10

## 2022-10-15 RX ADMIN — TIOTROPIUM BROMIDE INHALATION SPRAY 2 PUFF: 3.12 SPRAY, METERED RESPIRATORY (INHALATION) at 07:10

## 2022-10-15 NOTE — PROGRESS NOTES
[facility] - Intensive Care ([place] [address])  Infectious Disease  Progress Note    Patient Name: Cosme Lewis  MRN: [id number]  Admission Date: 10/11/2022  Length of Stay: 3 days  Attending Physician: Odessa Borges DO  Primary Care Provider: Primary Doctor No    Isolation Status: No active isolations  Assessment/Plan:      Gram-positive bacteremia  Mr. Lewis is a 56 y/o male NH resident, HLD, HTN, COPD on O2, ESRD on HD presents to ED for LLE swelling and cellulitis. U/s negative for LLE DVT. CTA +pulmonary embolism and bilateral pleural effusions and ascites. Blood cultures on admit +MSSA and viridan strep. ID consulted for abx mgmt.     Recommendations  1. continue cefazolin as with MSSA and viridan strep. Repeat blood cultures NGTD  2. General surgery following, no evidence of compartment syndrome or necrotizing fasciitis. CT studies with myositis. No abscesses.   3. Continue judicial leg elevation ankle>knee. Discussed with nursing. Leg swelling much improved today  4. TTE studies. Consider VIANNEY if with persistent bacteremia.   5. Anticipate 4 weeks of IV abx for complicated bacteremia. Recommend repeat blood cultures after completion of IV abx to ensure cultures remain negative.     Outpatient Antibiotic Therapy Plan:    Please send referral to [facility].    1) Infection:  bacteremia    2) Discharge Antibiotics:    Intravenous antibiotics:   Cefazolin 2-2-3g after HD    3) Therapy Duration:  4 weeks    Estimated end date of IV antibiotics: 11/9/22    4) Outpatient Weekly Labs:    Order the following labs to be drawn on Mondays:    CBC   CMP    CRP     CPK (when on Daptomycin)   Vancomycin trough. Target 15-20    If discharged on vancomycin IV, order the following additional labs to be drawn on Thursdays:   CMP    Vancomycin trough. Target 15-20    If vancomycin trough is not at target (15-20) prior to discharge, schedule vancomycin trough to be drawn before their fourth  outpatient dose.    5) Fax Lab Results to Infectious Diseases Provider: [facility] Fax Number: [phone]    6) Outpatient Infectious Diseases Follow-up     Follow-up appointment will be arranged by the ID clinic and will be found in the patient's appointments tab.     Prior to discharge, please ensure the patient's follow-up has been scheduled.     If there is still no follow-up scheduled prior to discharge, please send an EPIC message to Jerrica Zaldivar in Infectious Diseases.                    Thank you for your consult. I will sign off. Please contact us if you have any additional questions.    Eduard Landry PA-C  Infectious Disease  [facility] - Intensive Care ([place])    Subjective:     Principal Problem:Cellulitis of left lower extremity    HPI: Mr. Lewis is a 58 y/o male NH resident, HLD, HTN, COPDESRD on HD presents to ED for LLE swelling and cellulitis. Denies having any fever chills or night sweats. Seen by podiatry, no acute intervention at this time. CTA +pulmonary embolism and bilateral pleural effusions and ascites. Blood cultures on admit +Staph aureus and viridan strep. He was ambulatory prior to this.     Interval History:   General surgery following, no evidence of compartment syndrome or necrotizing fasciitis. LLE swelling much  improved. Repeat blood culturse NGTD. HD today. Moving his legs better    Review of Systems   Constitutional:  Negative for activity change, appetite change, chills, diaphoresis and fever.   Respiratory:  Negative for cough and shortness of breath.    Cardiovascular:  Positive for leg swelling.   Gastrointestinal:  Negative for abdominal pain, diarrhea, nausea and vomiting.   Genitourinary:  Negative for dysuria.   Musculoskeletal:  Negative for back pain.   Skin:  Negative for color change, pallor, rash and wound.   Neurological:  Negative for dizziness.   Objective:     Vital Signs (Most Recent):  Temp: 98 °F (36.7 °C) (10/15/22 0800)  Pulse: 84  (10/15/22 0849)  Resp: 20 (10/15/22 0843)  BP: (!) 143/74 (10/15/22 0849)  SpO2: (!) 90 % (10/15/22 0951)   Vital Signs (24h Range):  Temp:  [97.8 °F (36.6 °C)-98.7 °F (37.1 °C)] 98 °F (36.7 °C)  Pulse:  [68-84] 84  Resp:  [14-22] 20  SpO2:  [89 %-96 %] 90 %  BP: (112-170)/(65-81) 143/74     Weight: 93 kg (205 lb)  Body mass index is 30.27 kg/m².    Estimated Creatinine Clearance: 17.6 mL/min (A) (based on SCr of 5.1 mg/dL (H)).    Physical Exam  Vitals and nursing note reviewed.   Constitutional:       General: He is not in acute distress.     Appearance: He is well-developed. He is not diaphoretic.   HENT:      Head: Normocephalic and atraumatic.   Eyes:      Pupils: Pupils are equal, round, and reactive to light.   Cardiovascular:      Rate and Rhythm: Normal rate and regular rhythm.      Heart sounds: Normal heart sounds. No murmur heard.    No friction rub. No gallop.   Pulmonary:      Effort: Pulmonary effort is normal. No respiratory distress.      Breath sounds: Normal breath sounds. No wheezing or rales.   Chest:      Chest wall: No tenderness.   Abdominal:      General: Bowel sounds are normal. There is no distension.      Palpations: There is no mass.      Tenderness: There is no abdominal tenderness. There is no guarding.   Musculoskeletal:         General: Swelling (improving) and tenderness (improved) present. No deformity. Normal range of motion.      Cervical back: Normal range of motion and neck supple.      Left lower leg: Edema (improved) present.      Comments: More ambulatory today   Skin:     General: Skin is warm and dry.      Findings: Erythema (improved) present. No rash.   Neurological:      Mental Status: He is alert and oriented to person, place, and time.   Psychiatric:         Behavior: Behavior normal.         Thought Content: Thought content normal.         Judgment: Judgment normal.       Significant Labs: All pertinent labs within the past 24 hours have been reviewed.    Significant  Imaging: I have reviewed all pertinent imaging results/findings within the past 24 hours.

## 2022-10-15 NOTE — PLAN OF CARE
Problem: Adult Inpatient Plan of Care  Goal: Plan of Care Review  Outcome: Adequate for Care Transition  Goal: Patient-Specific Goal (Individualized)  Outcome: Adequate for Care Transition  Goal: Absence of Hospital-Acquired Illness or Injury  Outcome: Adequate for Care Transition  Goal: Optimal Comfort and Wellbeing  Outcome: Adequate for Care Transition  Goal: Readiness for Transition of Care  Outcome: Adequate for Care Transition     Problem: Skin Injury Risk Increased  Goal: Skin Health and Integrity  Outcome: Adequate for Care Transition     Problem: Diabetes Comorbidity  Goal: Blood Glucose Level Within Targeted Range  Outcome: Adequate for Care Transition     Problem: Device-Related Complication Risk (Hemodialysis)  Goal: Safe, Effective Therapy Delivery  Outcome: Adequate for Care Transition     Problem: Hemodynamic Instability (Hemodialysis)  Goal: Effective Tissue Perfusion  Outcome: Adequate for Care Transition     Problem: Infection (Hemodialysis)  Goal: Absence of Infection Signs and Symptoms  Outcome: Adequate for Care Transition     Problem: Adjustment to Illness (Sepsis/Septic Shock)  Goal: Optimal Coping  Outcome: Adequate for Care Transition     Problem: Bleeding (Sepsis/Septic Shock)  Goal: Absence of Bleeding  Outcome: Adequate for Care Transition     Problem: Glycemic Control Impaired (Sepsis/Septic Shock)  Goal: Blood Glucose Level Within Desired Range  Outcome: Adequate for Care Transition     Problem: Infection Progression (Sepsis/Septic Shock)  Goal: Absence of Infection Signs and Symptoms  Outcome: Adequate for Care Transition     Problem: Nutrition Impaired (Sepsis/Septic Shock)  Goal: Optimal Nutrition Intake  Outcome: Adequate for Care Transition     Problem: Electrolyte Imbalance (Chronic Kidney Disease)  Goal: Electrolyte Balance  Outcome: Adequate for Care Transition     Problem: Fluid Volume Excess (Chronic Kidney Disease)  Goal: Fluid Balance  Outcome: Adequate for Care  Transition

## 2022-10-15 NOTE — PLAN OF CARE
[facility] - Intensive Care ([facility])  Discharge Final Note    Primary Care Provider: Primary Doctor No    Expected Discharge Date: 10/15/2022    Final Discharge Note (most recent)       Final Note - 10/15/22 1054          Final Note    Assessment Type Final Discharge Note (P)      Anticipated Discharge Disposition [facility] (P)    Pt to d/c back to [facility]. ADALGISA contacted [facility] who confirmed they have cefazolin in stock. Requested orders be faxed to . ADALGISA additionally faxed orders to pt nurse at .       Post-Acute Status    Post-Acute Authorization Placement (P)      Post-Acute Placement Status Set-up Complete/Auth obtained (P)    Transport set for 12 noon p/u. bs nurse can call report to . Nurse Shalini.    Discharge Delays None known at this time (P)                      Important Message from Medicare             Contact Info       [facility]   Specialty: Internal Medicine    [address]  [place] [address]   Phone: [phone]       Next Steps: Follow up in 1 week(s)    [facility] - Infectious Disease [facility]   Specialty: Infectious Diseases    [address]  [place] [address]   Phone: [phone]       Next Steps: Schedule an appointment as soon as possible for a visit in 2 week(s)          Vidhi Branham LMSW  Case Management Social Worker   [facility] [address]

## 2022-10-15 NOTE — SUBJECTIVE & OBJECTIVE
Interval History:   General surgery following, no evidence of compartment syndrome or necrotizing fasciitis. LLE swelling much  improved. Repeat blood culturse NGTD. HD today. Moving his legs better    Review of Systems   Constitutional:  Negative for activity change, appetite change, chills, diaphoresis and fever.   Respiratory:  Negative for cough and shortness of breath.    Cardiovascular:  Positive for leg swelling.   Gastrointestinal:  Negative for abdominal pain, diarrhea, nausea and vomiting.   Genitourinary:  Negative for dysuria.   Musculoskeletal:  Negative for back pain.   Skin:  Negative for color change, pallor, rash and wound.   Neurological:  Negative for dizziness.   Objective:     Vital Signs (Most Recent):  Temp: 98 °F (36.7 °C) (10/15/22 0800)  Pulse: 84 (10/15/22 0849)  Resp: 20 (10/15/22 0843)  BP: (!) 143/74 (10/15/22 0849)  SpO2: (!) 90 % (10/15/22 0951)   Vital Signs (24h Range):  Temp:  [97.8 °F (36.6 °C)-98.7 °F (37.1 °C)] 98 °F (36.7 °C)  Pulse:  [68-84] 84  Resp:  [14-22] 20  SpO2:  [89 %-96 %] 90 %  BP: (112-170)/(65-81) 143/74     Weight: 93 kg (205 lb)  Body mass index is 30.27 kg/m².    Estimated Creatinine Clearance: 17.6 mL/min (A) (based on SCr of 5.1 mg/dL (H)).    Physical Exam  Vitals and nursing note reviewed.   Constitutional:       General: He is not in acute distress.     Appearance: He is well-developed. He is not diaphoretic.   HENT:      Head: Normocephalic and atraumatic.   Eyes:      Pupils: Pupils are equal, round, and reactive to light.   Cardiovascular:      Rate and Rhythm: Normal rate and regular rhythm.      Heart sounds: Normal heart sounds. No murmur heard.    No friction rub. No gallop.   Pulmonary:      Effort: Pulmonary effort is normal. No respiratory distress.      Breath sounds: Normal breath sounds. No wheezing or rales.   Chest:      Chest wall: No tenderness.   Abdominal:      General: Bowel sounds are normal. There is no distension.      Palpations:  There is no mass.      Tenderness: There is no abdominal tenderness. There is no guarding.   Musculoskeletal:         General: Swelling (improving) and tenderness (improved) present. No deformity. Normal range of motion.      Cervical back: Normal range of motion and neck supple.      Left lower leg: Edema (improved) present.      Comments: More ambulatory today   Skin:     General: Skin is warm and dry.      Findings: Erythema (improved) present. No rash.   Neurological:      Mental Status: He is alert and oriented to person, place, and time.   Psychiatric:         Behavior: Behavior normal.         Thought Content: Thought content normal.         Judgment: Judgment normal.       Significant Labs: All pertinent labs within the past 24 hours have been reviewed.    Significant Imaging: I have reviewed all pertinent imaging results/findings within the past 24 hours.

## 2022-10-15 NOTE — NURSING
Pt did well entire night. Heparin was D/Cd at 2100 before eliquis dose was given. Pt awake majority of night and needed assistance with minor things like adjustments and help with the TV. Left lower leg dressing clean, dry, and intact. Same with L arm fistula dressing.

## 2022-10-15 NOTE — PLAN OF CARE
NURSING HOME ORDERS    10/15/2022  [facility]  [facility] ([facility])  [address]  [facility] [address]  Dept: [phone]  Loc: [phone]     Admit to Nursing Home:      Diagnoses:  Active Hospital Problems    Diagnosis  POA    *Cellulitis of left lower extremity [L03.116]  Yes     Priority: 2     Acute on chronic respiratory failure with hypoxia [J96.21]  Yes     Priority: 1 - High    ESRD on dialysis [N18.6, Z99.2]  Not Applicable     Priority: 3     Leg wound, left [S81.802A]  Unknown    Multiple subsegmental pulmonary emboli without acute cor pulmonale [I26.94]  Yes    Type 1 diabetes mellitus with chronic kidney disease on chronic dialysis [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, 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, 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.2]  Not Applicable    QT prolongation [R94.31]  Yes    Other emphysema [J43.8]  Yes    Gram-positive bacteremia [R78.81]  Yes    Acute HFrEF (heart failure with reduced ejection fraction) [I50.21]  Yes      Resolved Hospital Problems    Diagnosis Date Resolved POA    Anemia due to chronic kidney disease, on chronic dialysis [N18.6, D63.1, Z99.2] 10/13/2022 Not Applicable    Chronic kidney disease-mineral and bone disorder [N18.9, E83.9, M89.9] 10/13/2022 Yes    Sepsis with acute hypoxic respiratory failure without septic shock [A41.9, R65.20, J96.01] 10/13/2022 Yes       Patient is homebound due to:  Cellulitis of left lower extremity    Allergies:Review of patient's allergies indicates:  No Known Allergies    Vitals:  Routine    Diet: cardiac diet and diabetic diet: 2000 calorie    Activities:   Activity as tolerated    Goals of Care Treatment Preferences:  Code Status: Full Code      Consults:   PT to evaluate and treat- 2 times a week, OT to evaluate and treat- 2 times a week, and Wound Care     Miscellaneous Care: Diabetes Care: Diabetes: Check blood sugar. Fingerstick blood sugar A.M and p.m.  Sliding Scale/Hypoglycemia Protocol: For FSG<80, give 1 amp D50 or 1 glucose tablet. For FSG , do  nothing. For -200, give 1 unit of novolog in addition to pre-meal insulin. For -250, give 2 units of novolog in addition to pre-meal insulin. For -300, give 3 units of novolog in addition to pre-meal insulin. For 301-350, give 4 units of novolog in addition to pre-meal insulin. For 351-400, give 5 units of novolog in addition to pre-meal insulin. For FSG >400, give 5 units of novolog in addition to pre-meal insulin and please call MD  CHF Care: Daily Weight with notification of MD/NP of 2lb or > increase in 24 hours    v/s and O2 sat every shift    Oxygen as needed for sats  88-92    Report abnormal breath sounds to MD/NP    Edema checks q shift- notify MD/NP of increased edema    Task segmentation by nursing for daily care to decrease exertion      CHF education to include diet ,medication, and CHF flags for MD notification                   Diabetes Care:  SN to perform and educate Diabetic management with blood glucose monitoring:      Medications: Discontinue all previous medication orders, if any. See new list below.     Medication List        START taking these medications      * apixaban 5 mg Tab  Commonly known as: ELIQUIS  Take 2 tablets (10 mg total) by mouth 2 (two) times daily. for 7 days     * apixaban 5 mg Tab  Commonly known as: ELIQUIS  Take 1 tablet (5 mg total) by mouth 2 (two) times daily.  Start taking on: October 22, 2022     atorvastatin 40 MG tablet  Commonly known as: LIPITOR  Take 1 tablet (40 mg total) by mouth once daily.     carvediloL 3.125 MG tablet  Commonly known as: COREG  Take 1 tablet (3.125 mg total) by mouth 2 (two) times daily.     oxyCODONE-acetaminophen 5-325 mg per tablet  Commonly known as: PERCOCET  Take 1 tablet by mouth every 6 (six) hours as needed for Pain.           * This list has 2 medication(s) that are the same as other medications prescribed for you. Read the directions carefully, and ask your doctor or other care provider to review them with  you.                CHANGE how you take these medications      insulin detemir U-100 100 unit/mL (3 mL) Inpn pen  Commonly known as: Levemir FLEXTOUCH  Inject 15 Units into the skin 2 (two) times daily. Inject 12 units into the skin every morning & 6 units at bedtime  What changed:   how much to take  how to take this  when to take this     * insulin aspart U-100 100 unit/mL (3 mL) Inpn pen  Commonly known as: NovoLOG  Inject 10 Units into the skin 3 (three) times daily with meals. Inject 8 units into the skin with breakfast and lunch & 10 units with dinner  What changed:   how much to take  how to take this  when to take this     * NOVOLOG FLEXPEN U-100 INSULIN SUBQ  Inject into the skin before meals and at bedtime per sliding scale: 0-60= OJ or glucose tab; = 0; 201-250= 2; 251-300= 4; 301-350= 6; 351-400= 8; greater than 400= 10 units then call MD  What changed: Another medication with the same name was changed. Make sure you understand how and when to take each.           * This list has 2 medication(s) that are the same as other medications prescribed for you. Read the directions carefully, and ask your doctor or other care provider to review them with you.                CONTINUE taking these medications      acetaminophen 650 MG Tbsr  Commonly known as: TYLENOL  Take 650 mg by mouth every 8 (eight) hours as needed (pain or fever over 100.4).     albuterol 90 mcg/actuation inhaler  Commonly known as: PROVENTIL/VENTOLIN HFA  Inhale 2 puffs into the lungs every 6 (six) hours as needed for Wheezing or Shortness of Breath.     aspirin 81 MG EC tablet  Commonly known as: ECOTRIN  Take 81 mg by mouth once daily.     calcium acetate(phosphat bind) 667 mg capsule  Commonly known as: PHOSLO  Take 667 mg by mouth 3 (three) times daily.     cetirizine 10 MG tablet  Commonly known as: ZYRTEC  Take 10 mg by mouth daily as needed (itching).     cloNIDine 0.3 mg/24 hr td ptwk 0.3 mg/24 hr  Commonly known as:  CATAPRES  Place 1 patch onto the skin every Saturday.     FLUoxetine 40 MG capsule  Take 40 mg by mouth once daily.     fluticasone-salmeterol 250-50 mcg/dose 250-50 mcg/dose diskus inhaler  Commonly known as: ADVAIR  Inhale 1 puff into the lungs 2 (two) times daily.     GLUCAGON EMERGENCY KIT (HUMAN) 1 mg Solr  Generic drug: glucagon  1 mg into the muscle as needed if CBG < 70     hydrALAZINE 100 MG tablet  Commonly known as: APRESOLINE  Take 100 mg by mouth 3 (three) times daily as needed (blood pressure  greater than 190/100).     HYDROPHILIC CREAM TOP  Apply topically. Apply liberal amount to affected area twice daily for dry skin     isosorbide mononitrate 30 MG 24 hr tablet  Commonly known as: IMDUR  Take 30 mg by mouth 2 (two) times daily.     melatonin 3 mg Tbdl  Take 9 mg by mouth nightly as needed (sleep).     NIFEdipine 60 MG Tbsr  Commonly known as: ADALAT CC  Take 120 mg by mouth every evening.     sodium chloride 0.65 % nasal spray  Commonly known as: OCEAN  2 sprays by Nasal route every 4 (four) hours as needed for Congestion.     tiotropium bromide 2.5 mcg/actuation inhaler  Commonly known as: SPIRIVA RESPIMAT  Inhale 2 puffs into the lungs Daily.     triamcinolone acetonide 0.025 % Lotn  Apply topically. Apply to the affected area twice daily     vitamin renal formula (B-complex-vitamin c-folic acid) 1 mg Cap  Commonly known as: NEPHROCAP  Take 1 capsule by mouth once daily.            STOP taking these medications      hydrOXYzine HCL 10 MG Tab  Commonly known as: ATARAX     lisinopriL 40 MG tablet  Commonly known as: PRINIVIL,ZESTRIL     OLANZapine 5 MG tablet  Commonly known as: ZyPREXA     oxymetazoline 0.05 % nasal spray  Commonly known as: AFRIN     rosuvastatin 20 MG tablet  Commonly known as: CRESTOR     sevelamer carbonate 800 mg Tab  Commonly known as: RENVELA                Immunizations Administered as of 10/15/2022       No immunizations on file.            This patient has had both  covid vaccinations    Some patients may experience side effects after vaccination.  These may include fever, headache, muscle or joint aches.  Most symptoms resolve with 24-48 hours and do not require urgent medical evaluation unless they persist for more than 72 hours or symptoms are concerning for an unrelated medical condition.          _________________________________  Jesus Manuel Patel MD  10/15/2022

## 2022-10-15 NOTE — PLAN OF CARE
NURSING HOME ORDERS    10/15/2022  [facility]  [facility] ([facility])  [address]  [facility] [address]  Dept: [phone]  Loc: [phone]     Admit to Nursing Home:  [facility]    Diagnoses:  Active Hospital Problems    Diagnosis  POA    *Cellulitis of left lower extremity [L03.116]  Yes    Leg wound, left [S81.802A]  Unknown    Multiple subsegmental pulmonary emboli without acute cor pulmonale [I26.94]  Yes    Acute on chronic respiratory failure with hypoxia [J96.21]  Yes    Type 1 diabetes mellitus with chronic kidney disease on chronic dialysis [E10.22, N18.6, Z99.2]  Not Applicable    QT prolongation [R94.31]  Yes    Other emphysema [J43.8]  Yes    Gram-positive bacteremia [R78.81]  Yes    Acute HFrEF (heart failure with reduced ejection fraction) [I50.21]  Yes    ESRD on dialysis [N18.6, Z99.2]  Not Applicable      Resolved Hospital Problems    Diagnosis Date Resolved POA    Anemia due to chronic kidney disease, on chronic dialysis [N18.6, D63.1, Z99.2] 10/13/2022 Not Applicable    Chronic kidney disease-mineral and bone disorder [N18.9, E83.9, M89.9] 10/13/2022 Yes    Sepsis with acute hypoxic respiratory failure without septic shock [A41.9, R65.20, J96.01] 10/13/2022 Yes       Patient is homebound due to:  Cellulitis of left lower extremity    Allergies:Review of patient's allergies indicates:  No Known Allergies    Vitals:  Routine    Diet: diabetic diet: 2000 calorie    Activities:   Activity as tolerated    Goals of Care Treatment Preferences:  Code Status: Full Code    Labs:  CMP, CBC weekly for 4 weeks with labs sent to infectious disease  Fax number [phone] Attn: Eduard Landry    Nursing Precautions:  Aspiration , Fall, and Pressure ulcer prevention    Miscellaneous Care: Routine Skin for Bedridden Patients:  Apply moisture barrier cream to all  Wound Care: yes:  Surgical Wound:  Location: Left Leg    Consult ET nurse        Apply the  following to wound:   Collagenase (Santyl) daily, cover with telfa, wrap with with kerlix dressing    Patient is normally on 2L of O2 continuously but may need anywhere between 3-4 L of O2 to keep sats 88-92% as he has an acute pulmonary embolism that he is currently being treated for.      Diabetes Care:  SN to perform and educate Diabetic management with blood glucose monitoring:      Medications: Discontinue all previous medication orders, if any. See new list below.     Medication List        START taking these medications      * apixaban 5 mg Tab  Commonly known as: ELIQUIS  Take 2 tablets (10 mg total) by mouth 2 (two) times daily. for 7 days     * apixaban 5 mg Tab  Commonly known as: ELIQUIS  Take 1 tablet (5 mg total) by mouth 2 (two) times daily. Start this dose AFTER you have completed the 7 days of the 10 mg dose.  Start taking on: October 22, 2022     atorvastatin 40 MG tablet  Commonly known as: LIPITOR  Take 1 tablet (40 mg total) by mouth once daily.     carvediloL 3.125 MG tablet  Commonly known as: COREG  Take 1 tablet (3.125 mg total) by mouth 2 (two) times daily.     ceFAZolin 1 g/50ml Dextrose IVPB 1 gram/50 mL  Commonly known as: ANCEF  Give 2 grams IV AFTER HD on Mondays and Wednesdays, then give 3 grams AFTER HD on Fridays. End date: 11/15/22     oxyCODONE-acetaminophen 5-325 mg per tablet  Commonly known as: PERCOCET  Take 1 tablet by mouth every 6 (six) hours as needed for Pain.           * This list has 2 medication(s) that are the same as other medications prescribed for you. Read the directions carefully, and ask your doctor or other care provider to review them with you.                CHANGE how you take these medications      insulin detemir U-100 100 unit/mL (3 mL) Inpn pen  Commonly known as: Levemir FLEXTOUCH  Inject 15 Units into the skin 2 (two) times daily. Once in the morning and once before bedtime  What changed:   how much to take  how to take this  when to take  this  additional instructions     * insulin aspart U-100 100 unit/mL (3 mL) Inpn pen  Commonly known as: NovoLOG  Inject 10 Units into the skin 3 (three) times daily with meals.  What changed:   how much to take  how to take this  when to take this  additional instructions     * NOVOLOG FLEXPEN U-100 INSULIN SUBQ  Inject into the skin before meals and at bedtime per sliding scale: 0-60= OJ or glucose tab; = 0; 201-250= 2; 251-300= 4; 301-350= 6; 351-400= 8; greater than 400= 10 units then call MD  What changed: Another medication with the same name was changed. Make sure you understand how and when to take each.           * This list has 2 medication(s) that are the same as other medications prescribed for you. Read the directions carefully, and ask your doctor or other care provider to review them with you.                CONTINUE taking these medications      acetaminophen 650 MG Tbsr  Commonly known as: TYLENOL  Take 650 mg by mouth every 8 (eight) hours as needed (pain or fever over 100.4).     albuterol 90 mcg/actuation inhaler  Commonly known as: PROVENTIL/VENTOLIN HFA  Inhale 2 puffs into the lungs every 6 (six) hours as needed for Wheezing or Shortness of Breath.     aspirin 81 MG EC tablet  Commonly known as: ECOTRIN  Take 81 mg by mouth once daily.     calcium acetate(phosphat bind) 667 mg capsule  Commonly known as: PHOSLO  Take 667 mg by mouth 3 (three) times daily.     cetirizine 10 MG tablet  Commonly known as: ZYRTEC  Take 10 mg by mouth daily as needed (itching).     cloNIDine 0.3 mg/24 hr td ptwk 0.3 mg/24 hr  Commonly known as: CATAPRES  Place 1 patch onto the skin every Saturday.     FLUoxetine 40 MG capsule  Take 40 mg by mouth once daily.     fluticasone-salmeterol 250-50 mcg/dose 250-50 mcg/dose diskus inhaler  Commonly known as: ADVAIR  Inhale 1 puff into the lungs 2 (two) times daily.     GLUCAGON EMERGENCY KIT (HUMAN) 1 mg Solr  Generic drug: glucagon  1 mg into the muscle as needed if  CBG < 70     hydrALAZINE 100 MG tablet  Commonly known as: APRESOLINE  Take 100 mg by mouth 3 (three) times daily as needed (blood pressure  greater than 190/100).     HYDROPHILIC CREAM TOP  Apply topically. Apply liberal amount to affected area twice daily for dry skin     isosorbide mononitrate 30 MG 24 hr tablet  Commonly known as: IMDUR  Take 30 mg by mouth 2 (two) times daily.     melatonin 3 mg Tbdl  Take 9 mg by mouth nightly as needed (sleep).     NIFEdipine 60 MG Tbsr  Commonly known as: ADALAT CC  Take 120 mg by mouth every evening.     sodium chloride 0.65 % nasal spray  Commonly known as: OCEAN  2 sprays by Nasal route every 4 (four) hours as needed for Congestion.     tiotropium bromide 2.5 mcg/actuation inhaler  Commonly known as: SPIRIVA RESPIMAT  Inhale 2 puffs into the lungs Daily.     triamcinolone acetonide 0.025 % Lotn  Apply topically. Apply to the affected area twice daily     vitamin renal formula (B-complex-vitamin c-folic acid) 1 mg Cap  Commonly known as: NEPHROCAP  Take 1 capsule by mouth once daily.            STOP taking these medications      hydrOXYzine HCL 10 MG Tab  Commonly known as: ATARAX     lisinopriL 40 MG tablet  Commonly known as: PRINIVIL,ZESTRIL     OLANZapine 5 MG tablet  Commonly known as: ZyPREXA     oxymetazoline 0.05 % nasal spray  Commonly known as: AFRIN     rosuvastatin 20 MG tablet  Commonly known as: CRESTOR     sevelamer carbonate 800 mg Tab  Commonly known as: RENVELA                Immunizations Administered as of 10/15/2022       No immunizations on file.            This patient has had both covid vaccinations    Some patients may experience side effects after vaccination.  These may include fever, headache, muscle or joint aches.  Most symptoms resolve with 24-48 hours and do not require urgent medical evaluation unless they persist for more than 72 hours or symptoms are concerning for an unrelated medical condition.           _________________________________  Odessa Borges DO  10/15/2022

## 2022-10-15 NOTE — DISCHARGE SUMMARY
[facility] - Intensive Care ([address] Medicine  Discharge Summary      Patient Name: Cosme Lewis  MRN: [id number]  Patient Class: IP- Inpatient  Admission Date: 10/11/2022  Hospital Length of Stay: 3 days  Discharge Date and Time:  10/15/2022 12:09 PM  Attending Physician: Odessa Borges DO   Discharging Provider: Jesus Manuel Patel MD  Primary Care Provider: Primary Doctor [facility] Medicine Team: [facility] HOSP MED 5 Jesus Manuel Patel MD    HPI:   Mr. Lewis is a 57-year-old male, poor historian, difficult to understand presents to the ED with chief complaint of left leg pain and edema.  [facility] he states that 2 days ago he started noticing swelling of his left leg with associated out of proportion pain that did not respond to NSAIDs.  States that pain is mostly located on his gastrocnemius.  Denies fever, chills, recent trauma or lacerations.  Affirms chronic dry scaly skin.  Osteomyelitis suspected in the ED.   While taking history patient also complained shortness of breath, states he uses oxygen in the nursing home does not know how many Lts.  Denies chest pain, palpitations, right lower extremity edema, testicular edema, cough.  Affirms fatigue, dyspnea of exertion, and is currently sleeping sleeps with 3 pillows.    Denies nausea vomiting, joint pain, constipation or diarrhea, dizziness, vision problems, recent infections, weight loss and loss of appetite.    PMHX:  End-stage renal disease on hemodialysis 3 times a week MWF, last HD 10/10, COPD, HTN, dyslipidemia, insulin-dependent diabetes ( contradictory information found in outside records, unclear if type 1 or type 2), unspecified history of coagulopathy and PE, previously on oral anticoagulation which was discontinued.  Allergies:NKDA  Meds:  Patient does not know name of medications, med rec done with outside records  Surgical:  Av fistula 2 years ago per patient.  Family Hx:  Patient only knows diabetes and  hypertension for her mother.  Social:  Patient lives in [facility] with his mother, works as , denies ETOH, denies recreational drug use (cocaine use in outside records) denies tobacco use.  States he eats food he is given in the nursing facility.         Hospital Course:   Patient presented with complaints of left leg pain and swelling. Noted to have acute on chronic respiratory failure and started on supplemental O2 (on 2L at baseline) HTN emergency, started on home antihypertensives and PRN hydralazine. Severely hyperglycemic to 400's, started on Sub Q insulin regimen. Started on Abx out of concerns of cellulitis and osteomyelitis. No acute process on foot x-ray. Fluid overloaded with BNP > 2000. Underwent dialysis with improvement in BP and volume status. No DVT seen on LE U/S. Multiple PEs seen on CT so started on heparin drip. Found to have MSSA and Strep Viridans bacteremia. Noted to have EF of 40 on Echo (down from previous 55), no valvular vegetations. Cannot initiate GDMT given CKD on dialysis. Switched labetalol to metoprolol. ID consulted given new bacteremia. Patient continued on Vanc per ID recs. Gen surg consulted given concerns for compartment syndrome vs nec fasc on CT of leg; no evidence of either etiology per gen surg; continue abx for cellulitis. Switched to Cefazolin. Will require abx with MWF HD. Transitioned to eliquis. Outpatient cardiology and podiatry follow up.        Goals of Care Treatment Preferences:  Code Status: Full Code    Vitals:    10/15/22 0800 10/15/22 0843 10/15/22 0849 10/15/22 0951   BP:   (!) 143/74    BP Location:       Patient Position:       Pulse: 75  84    Resp: (!) 21 20     Temp: 98 °F (36.7 °C)      TempSrc: Oral      SpO2: (!) 89%   (!) 90%   Weight:       Height:           Physical Exam  Constitutional:       Appearance: He is not ill-appearing or toxic-appearing.   HENT:      Head: Normocephalic and atraumatic.      Mouth/Throat:       Mouth: Mucous membranes are moist.   Eyes:      Extraocular Movements: Extraocular movements intact.      Pupils: Pupils are equal, round, and reactive to light.   Cardiovascular:      Rate and Rhythm: Regular rhythm. Tachycardia present.      Pulses: Normal pulses.      Heart sounds: No murmur heard.    No friction rub. No gallop.   Pulmonary:      On 4L NC     Breath sounds: No wheezing or rales.   Abdominal:      General: There is distension.      Palpations: There is no mass.      Tenderness: There is no abdominal tenderness. There is no guarding or rebound.      Hernia: No hernia is present.   Musculoskeletal:         General: Swelling and tenderness present.      Cervical back: Normal range of motion.      Left lower leg: Edema present.      Comments: LLE tenderness   Neurological:      Mental Status: He is alert.     Consults:   Consults (From admission, onward)        Status Ordering Provider     Inpatient consult to General Surgery  Once        Provider:  (Not yet assigned)    Completed ESHA SPEARS     Inpatient consult to Infectious Diseases  Once        Provider:  (Not yet assigned)    Completed LEONILA BULLOCK     Inpatient consult to Registered Dietitian/Nutritionist  Once        Provider:  (Not yet assigned)    Completed LEONILA BULLOCK     Inpatient consult to Nephrology  Once        Provider:  (Not yet assigned)    Completed KAREN LYNN     Inpatient consult to Podiatry  Once        Provider:  (Not yet assigned)    Completed KAREN LYNN          Final Active Diagnoses:    Diagnosis Date Noted POA    PRINCIPAL PROBLEM:  Cellulitis of left lower extremity [L03.116] 10/12/2022 Yes    Acute on chronic respiratory failure with hypoxia [J96.21] 10/12/2022 Yes    ESRD on dialysis [N18.6, Z99.2]  Not Applicable    Leg wound, left [S81.802A] 10/14/2022 Unknown    Multiple subsegmental pulmonary emboli without acute cor pulmonale [I26.94] 10/13/2022 Yes    Type 1 diabetes  mellitus with chronic kidney disease on chronic dialysis [E10.22, N18.6, Z99.2] 10/12/2022 Not Applicable    QT prolongation [R94.31] 10/12/2022 Yes    Other emphysema [J43.8] 10/12/2022 Yes    Gram-positive bacteremia [R78.81] 10/12/2022 Yes    Acute HFrEF (heart failure with reduced ejection fraction) [I50.21] 10/12/2022 Yes      Problems Resolved During this Admission:    Diagnosis Date Noted Date Resolved POA    Anemia due to chronic kidney disease, on chronic dialysis [N18.6, D63.1, Z99.2] 10/12/2022 10/13/2022 Not Applicable    Chronic kidney disease-mineral and bone disorder [N18.9, E83.9, M89.9] 10/12/2022 10/13/2022 Yes    Sepsis with acute hypoxic respiratory failure without septic shock [A41.9, R65.20, J96.01] 10/12/2022 10/13/2022 Yes       Discharged Condition: fair    Disposition:     Follow Up:   Follow-up Information     [facility] Follow up in 1 week(s).    Specialty: Internal Medicine  Contact information:  [address] [facility] [address] [phone]  Additional information:  [facility]   Please park in surface lot and check in at central registration desk           [facility] - Infectious Disease 1st Fl. Schedule an appointment as soon as possible for a visit in 2 week(s).    Specialty: Infectious Diseases  Contact information:  [address] [facility] [address] [phone]  Additional information:  Main Building, 1st floor near [address] entrance   Please park in South Garage           [facility] Schedule an appointment as soon as possible for a visit in 2 day(s).    Specialty: Podiatry  [facility]:  [address] [facility] [address] [phone]  Additional information:  [facility] - Main Building, 5th Floor   Please park in [address] Garage and use Atrium elevator                     Patient Instructions:      Ambulatory  "referral/consult to Cardiology   Standing Status: Future   Referral Priority: Routine Referral Type: Consultation   Referral Reason: Specialty Services Required   Requested Specialty: Cardiology   Number of Visits Requested: 1     Ambulatory referral/consult to Infectious Disease   Standing Status: Future   Referral Priority: Routine Referral Type: Consultation   Referral Reason: Specialty Services Required   Requested Specialty: Infectious Diseases   Number of Visits Requested: 1     Ambulatory referral/consult to Podiatry   Standing Status: Future   Referral Priority: Routine Referral Type: Consultation   Referral Reason: Specialty Services Required   Referred to Provider: BRITTANEY MONSALVE Requested Specialty: Podiatry   Number of Visits Requested: 1       Significant Diagnostic Studies: Labs:   CMP   Recent Labs   Lab 10/14/22  0149   *   K 5.1      CO2 25   *   BUN 32*   CREATININE 5.1*   CALCIUM 7.8*   PROT 6.0   ALBUMIN 2.3*   BILITOT 0.5   ALKPHOS 217*   AST 21   ALT 13   ANIONGAP 8    and CBC   Recent Labs   Lab 10/14/22  0149   WBC 5.58   HGB 7.8*   HCT 25.7*          Pending Diagnostic Studies:     None         Medications:  Reconciled Home Medications:      Medication List      START taking these medications    atorvastatin 40 MG tablet  Commonly known as: LIPITOR  Take 1 tablet (40 mg total) by mouth once daily.     BD ULTRA-FINE RODRÍGUEZ PEN NEEDLE 32 gauge x 5/32" Ndle  Generic drug: pen needle, diabetic  Use to inject 1 each into the skin 5 (five) times daily.     carvediloL 3.125 MG tablet  Commonly known as: COREG  Take 1 tablet (3.125 mg total) by mouth 2 (two) times daily.     ceFAZolin 1 g/50ml Dextrose IVPB 1 gram/50 mL  Commonly known as: ANCEF  Give 2 grams IV AFTER HD on Mondays and Wednesdays, then give 3 grams AFTER HD on Fridays. End date: 11/15/22     * apixaban 5 mg Tab  Commonly known as: ELIQUIS  Take 2 tablets (10 mg total) by mouth 2 (two) times daily. for 7 days   "   * ELIQUIS 5 mg Tab  Generic drug: apixaban  Take 2 tablets (10 mg total) by mouth 2 (two) times daily for 7 days, THEN 1 tablet (5 mg total) 2 (two) times daily.  Start taking on: October 22, 2022     * apixaban 5 mg Tab  Commonly known as: ELIQUIS  Take 1 tablet (5 mg total) by mouth 2 (two) times daily. Start this dose AFTER you have completed the 7 days of the 10 mg dose.  Start taking on: October 22, 2022     oxyCODONE-acetaminophen 5-325 mg per tablet  Commonly known as: PERCOCET  Take 1 tablet by mouth every 6 (six) hours as needed for Pain.         * This list has 3 medication(s) that are the same as other medications prescribed for you. Read the directions carefully, and ask your doctor or other care provider to review them with you.            CHANGE how you take these medications    LEVEMIR FLEXTOUCH U-100 INSULN 100 unit/mL (3 mL) Inpn pen  Generic drug: insulin detemir U-100  Inject 15 Units into the skin 2 (two) times daily. Once in the morning and once before bedtime. Max 30 units per day  What changed:   · how much to take  · how to take this  · when to take this  · additional instructions     * NovoLOG Flexpen U-100 Insulin 100 unit/mL (3 mL) Inpn pen  Generic drug: insulin aspart U-100  Inject 10 Units into the skin 3 (three) times daily with meals. Max 30 units per day  What changed:   · how much to take  · how to take this  · when to take this  · additional instructions     * NOVOLOG FLEXPEN U-100 INSULIN SUBQ  Inject into the skin before meals and at bedtime per sliding scale: 0-60= OJ or glucose tab; = 0; 201-250= 2; 251-300= 4; 301-350= 6; 351-400= 8; greater than 400= 10 units then call MD  What changed: Another medication with the same name was changed. Make sure you understand how and when to take each.         * This list has 2 medication(s) that are the same as other medications prescribed for you. Read the directions carefully, and ask your doctor or other care provider to review  them with you.            CONTINUE taking these medications    acetaminophen 650 MG Tbsr  Commonly known as: TYLENOL  Take 650 mg by mouth every 8 (eight) hours as needed (pain or fever over 100.4).     albuterol 90 mcg/actuation inhaler  Commonly known as: PROVENTIL/VENTOLIN HFA  Inhale 2 puffs into the lungs every 6 (six) hours as needed for Wheezing or Shortness of Breath.     aspirin 81 MG EC tablet  Commonly known as: ECOTRIN  Take 81 mg by mouth once daily.     calcium acetate(phosphat bind) 667 mg capsule  Commonly known as: PHOSLO  Take 667 mg by mouth 3 (three) times daily.     cetirizine 10 MG tablet  Commonly known as: ZYRTEC  Take 10 mg by mouth daily as needed (itching).     cloNIDine 0.3 mg/24 hr td ptwk 0.3 mg/24 hr  Commonly known as: CATAPRES  Place 1 patch onto the skin every Saturday.     FLUoxetine 40 MG capsule  Take 40 mg by mouth once daily.     fluticasone-salmeterol 250-50 mcg/dose 250-50 mcg/dose diskus inhaler  Commonly known as: ADVAIR  Inhale 1 puff into the lungs 2 (two) times daily.     GLUCAGON EMERGENCY KIT (HUMAN) 1 mg Solr  Generic drug: glucagon  1 mg into the muscle as needed if CBG < 70     hydrALAZINE 100 MG tablet  Commonly known as: APRESOLINE  Take 100 mg by mouth 3 (three) times daily as needed (blood pressure  greater than 190/100).     HYDROPHILIC CREAM TOP  Apply topically. Apply liberal amount to affected area twice daily for dry skin     isosorbide mononitrate 30 MG 24 hr tablet  Commonly known as: IMDUR  Take 30 mg by mouth 2 (two) times daily.     melatonin 3 mg Tbdl  Take 9 mg by mouth nightly as needed (sleep).     NIFEdipine 60 MG Tbsr  Commonly known as: ADALAT CC  Take 120 mg by mouth every evening.     sodium chloride 0.65 % nasal spray  Commonly known as: OCEAN  2 sprays by Nasal route every 4 (four) hours as needed for Congestion.     tiotropium bromide 2.5 mcg/actuation inhaler  Commonly known as: SPIRIVA RESPIMAT  Inhale 2 puffs into the lungs Daily.      triamcinolone acetonide 0.025 % Lotn  Apply topically. Apply to the affected area twice daily     vitamin renal formula (B-complex-vitamin c-folic acid) 1 mg Cap  Commonly known as: NEPHROCAP  Take 1 capsule by mouth once daily.        STOP taking these medications    hydrOXYzine HCL 10 MG Tab  Commonly known as: ATARAX     lisinopriL 40 MG tablet  Commonly known as: PRINIVIL,ZESTRIL     OLANZapine 5 MG tablet  Commonly known as: ZyPREXA     oxymetazoline 0.05 % nasal spray  Commonly known as: AFRIN     rosuvastatin 20 MG tablet  Commonly known as: CRESTOR     sevelamer carbonate 800 mg Tab  Commonly known as: RENVELA            Indwelling Lines/Drains at time of discharge:   Lines/Drains/Airways     Drain  Duration                Hemodialysis AV Fistula Left upper arm -- days                Time spent on the discharge of patient: 40 minutes       Jesus Manuel Patel MD  Internal Medicine, PGY-1  [facility]

## 2022-10-15 NOTE — ASSESSMENT & PLAN NOTE
Mr. Lewis is a 58 y/o male NH resident, HLD, HTN, COPD on O2, ESRD on HD presents to ED for LLE swelling and cellulitis. U/s negative for LLE DVT. CTA +pulmonary embolism and bilateral pleural effusions and ascites. Blood cultures on admit +MSSA and viridan strep. ID consulted for abx mgmt.     Recommendations  1. continue cefazolin as with MSSA and viridan strep. Repeat blood cultures NGTD  2. General surgery following, no evidence of compartment syndrome or necrotizing fasciitis. CT studies with myositis. No abscesses.   3. Continue judicial leg elevation ankle>knee. Discussed with nursing. Leg swelling much improved today  4. TTE studies. Consider VIANNEY if with persistent bacteremia.   5. Anticipate 4 weeks of IV abx for complicated bacteremia. Recommend repeat blood cultures after completion of IV abx to ensure cultures remain negative.     Outpatient Antibiotic Therapy Plan:    Please send referral to [facility].    1) Infection:  bacteremia    2) Discharge Antibiotics:    Intravenous antibiotics:   Cefazolin 2-2-3g after HD    3) Therapy Duration:  4 weeks    Estimated end date of IV antibiotics: 11/9/22    4) Outpatient Weekly Labs:    Order the following labs to be drawn on Mondays:    CBC   CMP    CRP     CPK (when on Daptomycin)   Vancomycin trough. Target 15-20    If discharged on vancomycin IV, order the following additional labs to be drawn on Thursdays:   CMP    Vancomycin trough. Target 15-20    If vancomycin trough is not at target (15-20) prior to discharge, schedule vancomycin trough to be drawn before their fourth outpatient dose.    5) Fax Lab Results to Infectious Diseases [facility] Number: [phone]    6) Outpatient Infectious Diseases Follow-up     Follow-up appointment will be arranged by the ID clinic and will be found in the patient's appointments tab.     Prior to discharge, please ensure the patient's follow-up has  been scheduled.     If there is still no follow-up scheduled prior to discharge, please send an EPIC message to Jerrica Zaldivar in Infectious Diseases.

## 2022-10-15 NOTE — NURSING
Pt awake, alert and oriented, watching football. Pt needs assistance with basic ADLs such as adjusting the TV, and moving his tray closer to him. Blood sugars remain elevated in 190s-250s. Scheduled insulin and PRN sliding scale adminsitered. Pt has been discharged back to [facility], room 209. Report called into to ADELITA Loya at 1340 PM. PIV and telemetry has been discontinued. Pt medications have been delivered via pharmacy. AVS has been printed. Awaiting ambulance transport.

## 2022-10-15 NOTE — PLAN OF CARE
Patient being discharged back to skilled nursing NH with Abx therapy set up to be given with HD.    Problem: Adult Inpatient Plan of Care  Goal: Plan of Care Review  10/15/2022 1125 by Arelis Ordonez RN  Outcome: Met  10/15/2022 1124 by Arelis Ordonez RN  Outcome: Adequate for Care Transition  Goal: Patient-Specific Goal (Individualized)  10/15/2022 1125 by Arelis Ordonez RN  Outcome: Met  10/15/2022 1124 by Arelis Ordonez RN  Outcome: Adequate for Care Transition  Goal: Absence of Hospital-Acquired Illness or Injury  10/15/2022 1125 by Arelis Ordonez RN  Outcome: Met  10/15/2022 1124 by Arelis Ordonez RN  Outcome: Adequate for Care Transition  Goal: Optimal Comfort and Wellbeing  10/15/2022 1125 by Arelis Ordonez RN  Outcome: Met  10/15/2022 1124 by Arelis Ordonez RN  Outcome: Adequate for Care Transition  Goal: Readiness for Transition of Care  10/15/2022 1125 by Arelis Ordonez RN  Outcome: Met  10/15/2022 1124 by Arelis Ordonez RN  Outcome: Adequate for Care Transition     Problem: Skin Injury Risk Increased  Goal: Skin Health and Integrity  10/15/2022 1125 by Arelis Ordonez RN  Outcome: Met  10/15/2022 1124 by Arelis Ordonez RN  Outcome: Adequate for Care Transition     Problem: Diabetes Comorbidity  Goal: Blood Glucose Level Within Targeted Range  10/15/2022 1125 by Arelis Ordonez RN  Outcome: Met  10/15/2022 1124 by Arelis Ordonez RN  Outcome: Adequate for Care Transition     Problem: Device-Related Complication Risk (Hemodialysis)  Goal: Safe, Effective Therapy Delivery  10/15/2022 1125 by Arelis Ordonez RN  Outcome: Met  10/15/2022 1124 by Arelis Ordonez RN  Outcome: Adequate for Care Transition     Problem: Hemodynamic Instability (Hemodialysis)  Goal: Effective Tissue Perfusion  10/15/2022 1125 by Arelis Ordonez RN  Outcome: Met  10/15/2022 1124 by Arelis Ordonez RN  Outcome: Adequate for Care Transition     Problem: Infection (Hemodialysis)  Goal: Absence of Infection Signs and  Symptoms  10/15/2022 1125 by Arelis Ordonez RN  Outcome: Met  10/15/2022 1124 by Arelis Ordonez RN  Outcome: Adequate for Care Transition     Problem: Adjustment to Illness (Sepsis/Septic Shock)  Goal: Optimal Coping  10/15/2022 1125 by Arelis Ordonez RN  Outcome: Met  10/15/2022 1124 by Arelis Ordonez RN  Outcome: Adequate for Care Transition     Problem: Bleeding (Sepsis/Septic Shock)  Goal: Absence of Bleeding  10/15/2022 1125 by Arelis Ordonez RN  Outcome: Met  10/15/2022 1124 by Arelis Ordonez RN  Outcome: Adequate for Care Transition     Problem: Glycemic Control Impaired (Sepsis/Septic Shock)  Goal: Blood Glucose Level Within Desired Range  10/15/2022 1125 by Arelis Ordonez RN  Outcome: Met  10/15/2022 1124 by Arelis Ordonez RN  Outcome: Adequate for Care Transition     Problem: Infection Progression (Sepsis/Septic Shock)  Goal: Absence of Infection Signs and Symptoms  10/15/2022 1125 by Arelis Ordonez RN  Outcome: Met  10/15/2022 1124 by Arelis Ordonez RN  Outcome: Adequate for Care Transition     Problem: Nutrition Impaired (Sepsis/Septic Shock)  Goal: Optimal Nutrition Intake  10/15/2022 1125 by Arelis Ordonez RN  Outcome: Met  10/15/2022 1124 by Arelis Ordonez RN  Outcome: Adequate for Care Transition     Problem: Electrolyte Imbalance (Chronic Kidney Disease)  Goal: Electrolyte Balance  10/15/2022 1125 by Arelis Ordonez RN  Outcome: Met  10/15/2022 1124 by Arelis Ordonez RN  Outcome: Adequate for Care Transition     Problem: Fluid Volume Excess (Chronic Kidney Disease)  Goal: Fluid Balance  10/15/2022 1125 by Arelis Ordonez RN  Outcome: Met  10/15/2022 1124 by Arelis Ordonez RN  Outcome: Adequate for Care Transition

## 2022-10-17 ENCOUNTER — PATIENT OUTREACH (OUTPATIENT)
Dept: ADMINISTRATIVE | Facility: CLINIC | Age: 57
End: 2022-10-17
Payer: MEDICARE

## 2022-10-17 LAB
BACTERIA BLD CULT: NORMAL

## 2022-11-18 PROBLEM — N18.6 ANEMIA IN ESRD (END-STAGE RENAL DISEASE): Status: ACTIVE | Noted: 2022-01-01

## 2022-11-18 PROBLEM — I15.0 RENOVASCULAR HYPERTENSION: Status: ACTIVE | Noted: 2022-01-01

## 2022-11-18 PROBLEM — D63.1 ANEMIA IN ESRD (END-STAGE RENAL DISEASE): Status: ACTIVE | Noted: 2022-01-01

## 2022-11-18 PROBLEM — E78.5 HLD (HYPERLIPIDEMIA): Status: ACTIVE | Noted: 2022-01-01

## 2022-11-18 NOTE — PROVIDER PROGRESS NOTES - EMERGENCY DEPT.
Encounter Date: 11/18/2022    ED Physician Progress Notes         EKG - STEMI Decision  Initial Reading: No STEMI present.  Response: No Action Needed.

## 2022-11-18 NOTE — ASSESSMENT & PLAN NOTE
- isosorbide mononitrate (IMDUR) 30 MG 24 hr tablet BID   - NIFEdipine (ADALAT CC) 120 MG TbSR daily   - hydrALAZINE (APRESOLINE) 100 MG tablet TID PRN   - carvediloL (COREG) 3.125 MG tablet BID   - cloNIDine 0.3 mg/24 hr td ptwk (CATAPRES) 0.3 mg/24 hr qweekly

## 2022-11-18 NOTE — ASSESSMENT & PLAN NOTE
Chronic kidney disease-mineral and bone disorder  MWF schedule  Residual renal function?- Yes    - Nephrology consulted  - Continue chronic hemodialysis  - calcium acetate,phosphat bind, (PHOSLO) 667 mg capsule TID   - Monitor daily electrolytes and defer dialysis orders to nephrology

## 2022-11-18 NOTE — ASSESSMENT & PLAN NOTE
Outpatient HD Information:    -Outpatient HD unit: [facility]  -HD tx days: MWF  -HD tx time: 240mins  -Last HD tx prior to presentation: 11/16/22  -HD access: LUE AVF  -HD modality: iHD   -Residual urine: Anuric   -EDW: 79kg     Plan/Recommendations:    -HD today for metabolic clearance and volume management   -Renal diet  -Strict I/O's and daily weights  -Daily renal function panels   -Renally dose meds

## 2022-11-18 NOTE — ED TRIAGE NOTES
Lives NH, was at dialysis today and they refused, stated he as in fluid overload and needed ER eval. Periorbital edema, SARI LE edema, SOB. EMS state 88% RA, 100% 2 L/min. States O2 use at baseline.

## 2022-11-18 NOTE — H&P
[facility]  Hospital Medicine  History & Physical    Patient Name: Cosme Lewis  MRN: [id number]  Patient Class: OP- Observation  Admission Date: 11/18/2022  Attending Physician: Britt Mason MD   Primary Care Provider: Primary Doctor No         Patient information was obtained from patient and ER records.     Subjective:     Principal Problem:Acute HFrEF (heart failure with reduced ejection fraction)    Chief Complaint:   Chief Complaint   Patient presents with    Shortness of Breath     Lives NH, was at dialysis today and they refused, stated he as in fluid overload and needed ER eval. Periorbital edema, SARI LE edema, SOB. EMS state 88% RA, 100% 2 L/min. States O2 use at baseline.         HPI: Cosme Lewis is a 57 y.o. 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 Per ED MD, the facility believed he appeared more overloaded than normal and therefore sent him to the ED. The patient reports he was at his mother's home before being picked up by transportation. He states he passed out in while in transport and never arrived at the facility, he does not seem like the most reliable historian. He reports using supplemental oxygen at baseline, unsure of how many liters. He denies chest pain, worsening shortness of breath, diarrhea, abdominal pain, cough or sore throat. He has no awareness of his medications. He states he lives in a facility for people who are 55 and older. He denies tobacco and alcohol use.     ED: afebrile without leukocytosis.nephrology was consulted for HD. VSS. BNP 2,255. Troponin 0.035. admitted to hospital medicine for further management.        Past Medical History:   Diagnosis Date    Chronic kidney disease-mineral and bone disorder 10/12/2022    COPD (chronic obstructive pulmonary disease)     Diabetes mellitus type 1      ESRD on dialysis     Hypertension     SOB (shortness of breath) 10/12/2022    Patient with history COPD treated with Ellipta the albuterol, fluticasone-salmeterol tachypneic in the ED saturating 94% and 6 L on nasal cannula, patient does not know how many  he uses it is in nursing home.    -duo nebs Q 4  Given that patient is a poor historian, and in the setting of left lower extremity edema and history of coagulopathy PE cannot be ruled out.  Will workup for possible infec       Past Surgical History:   Procedure Laterality Date    AV FISTULA PLACEMENT         Review of patient's allergies indicates:  No Known Allergies    No current facility-administered medications on file prior to encounter.     Current Outpatient Medications on File Prior to Encounter   Medication Sig    acetaminophen (TYLENOL) 650 MG TbSR Take 650 mg by mouth every 8 (eight) hours as needed (pain or fever over 100.4).    albuterol (PROVENTIL/VENTOLIN HFA) 90 mcg/actuation inhaler Inhale 2 puffs into the lungs every 6 (six) hours as needed for Wheezing or Shortness of Breath.    apixaban (ELIQUIS) 5 mg Tab Take 2 tablets (10 mg total) by mouth 2 (two) times daily for 7 days, THEN 1 tablet (5 mg total) 2 (two) times daily.    apixaban (ELIQUIS) 5 mg Tab Take 1 tablet (5 mg total) by mouth 2 (two) times daily. Start this dose AFTER you have completed the 7 days of the 10 mg dose.    aspirin (ECOTRIN) 81 MG EC tablet Take 81 mg by mouth once daily.    atorvastatin (LIPITOR) 40 MG tablet Take 1 tablet (40 mg total) by mouth once daily.    calcium acetate,phosphat bind, (PHOSLO) 667 mg capsule Take 667 mg by mouth 3 (three) times daily.    carvediloL (COREG) 3.125 MG tablet Take 1 tablet (3.125 mg total) by mouth 2 (two) times daily.    cetirizine (ZYRTEC) 10 MG tablet Take 10 mg by mouth daily as needed (itching).    cloNIDine 0.3 mg/24 hr td ptwk (CATAPRES) 0.3 mg/24 hr Place 1 patch onto the skin every Saturday.    FLUoxetine 40  "MG capsule Take 40 mg by mouth once daily.    fluticasone-salmeterol diskus inhaler 250-50 mcg Inhale 1 puff into the lungs 2 (two) times daily.    GLUCAGON EMERGENCY KIT, HUMAN, 1 mg injection 1 mg into the muscle as needed if CBG < 70    hydrALAZINE (APRESOLINE) 100 MG tablet Take 100 mg by mouth 3 (three) times daily as needed (blood pressure  greater than 190/100).    HYDROPHILIC CREAM TOP Apply topically. Apply liberal amount to affected area twice daily for dry skin    insulin aspart (NOVOLOG FLEXPEN U-100 INSULIN SUBQ) Inject into the skin before meals and at bedtime per sliding scale: 0-60= OJ or glucose tab; = 0; 201-250= 2; 251-300= 4; 301-350= 6; 351-400= 8; greater than 400= 10 units then call MD    insulin aspart U-100 (NOVOLOG) 100 unit/mL (3 mL) InPn pen Inject 10 Units into the skin 3 (three) times daily with meals. Max 30 units per day    insulin detemir U-100 (LEVEMIR FLEXTOUCH) 100 unit/mL (3 mL) SubQ InPn pen Inject 15 Units into the skin 2 (two) times daily. Once in the morning and once before bedtime. Max 30 units per day    isosorbide mononitrate (IMDUR) 30 MG 24 hr tablet Take 30 mg by mouth 2 (two) times daily.    melatonin 3 mg TbDL Take 9 mg by mouth nightly as needed (sleep).    NIFEdipine (ADALAT CC) 60 MG TbSR Take 120 mg by mouth every evening.    oxyCODONE-acetaminophen (PERCOCET) 5-325 mg per tablet Take 1 tablet by mouth every 6 (six) hours as needed for Pain.    pen needle, diabetic 32 gauge x 5/32" Ndle Use to inject 1 each into the skin 5 (five) times daily.    sodium chloride (OCEAN) 0.65 % nasal spray 2 sprays by Nasal route every 4 (four) hours as needed for Congestion.    tiotropium bromide (SPIRIVA RESPIMAT) 2.5 mcg/actuation inhaler Inhale 2 puffs into the lungs Daily.    triamcinolone acetonide 0.025 % Lotn Apply topically. Apply to the affected area twice daily    vitamin renal formula, B-complex-vitamin c-folic acid, (NEPHROCAP) 1 mg Cap Take 1 " capsule by mouth once daily.     Family History       Problem Relation (Age of Onset)    Diabetes Mother          Tobacco Use    Smoking status: Never    Smokeless tobacco: Never   Substance and Sexual Activity    Alcohol use: Never    Drug use: Not on file    Sexual activity: Not on file     Review of Systems   Constitutional:  Negative for chills and fever.   HENT:  Negative for congestion and drooling.    Eyes:  Negative for photophobia and visual disturbance.   Respiratory:  Positive for shortness of breath. Negative for cough.    Cardiovascular:  Positive for leg swelling. Negative for chest pain and palpitations.   Gastrointestinal:  Negative for abdominal distention and abdominal pain.   Endocrine: Negative for cold intolerance and heat intolerance.   Genitourinary:  Negative for difficulty urinating and dysuria.   Musculoskeletal:  Negative for back pain and gait problem.   Skin:  Negative for color change and pallor.   Allergic/Immunologic: Negative for immunocompromised state.   Neurological:  Negative for dizziness and headaches.   Psychiatric/Behavioral:  Positive for agitation. Negative for behavioral problems.    Objective:     Vital Signs (Most Recent):  Temp: 98.3 °F (36.8 °C) (11/18/22 1152)  Pulse: 83 (11/18/22 1445)  Resp: 18 (11/18/22 1445)  BP: (!) 149/84 (11/18/22 1430)  SpO2: 97 % (11/18/22 1445)   Vital Signs (24h Range):  Temp:  [98.3 °F (36.8 °C)] 98.3 °F (36.8 °C)  Pulse:  [79-85] 83  Resp:  [11-22] 18  SpO2:  [93 %-100 %] 97 %  BP: (134-165)/(75-89) 149/84     Weight: 93 kg (205 lb)  Body mass index is 30.27 kg/m².    Physical Exam  Vitals and nursing note reviewed.   Constitutional:       Appearance: He is obese.   HENT:      Head: Normocephalic.      Mouth/Throat:      Mouth: Mucous membranes are moist.   Eyes:      General: No scleral icterus.     Pupils: Pupils are equal, round, and reactive to light.   Cardiovascular:      Rate and Rhythm: Normal rate and regular rhythm.       Pulses: Normal pulses.   Abdominal:      General: Abdomen is flat. There is no distension.      Tenderness: There is abdominal tenderness.   Musculoskeletal:         General: Normal range of motion.      Cervical back: Normal range of motion.      Right lower leg: Edema present.      Left lower leg: Edema present.   Skin:     General: Skin is warm and dry.      Capillary Refill: Capillary refill takes less than 2 seconds.      Coloration: Skin is not jaundiced.   Neurological:      General: No focal deficit present.      Mental Status: He is alert and oriented to person, place, and time.      Cranial Nerves: No cranial nerve deficit.   Psychiatric:         Mood and Affect: Mood normal.         Behavior: Behavior normal.         CRANIAL NERVES     CN III, IV, VI   Pupils are equal, round, and reactive to light.     Significant Labs: All pertinent labs within the past 24 hours have been reviewed.    Significant Imaging: I have reviewed all pertinent imaging results/findings within the past 24 hours.    Assessment/Plan:     * Acute HFrEF (heart failure with reduced ejection fraction)  - CHF is currently uncontrolled due to Continued edema of extremities and hypervolemia in the setting of renal failure  - Latest echo 10/12/2022: EF 40%, (+)DD, CVP 15 mmHg, PAP 65mmHg  - Continue Beta Blocker    - home diuretic: n/a may benefit from lasix    - hospital diuresis: lasix 100 MG tomorrow AM   - carvediloL (COREG) 3.125 MG tablet BID   - Place on fluid restriction of 1.5 L. Continue to stress to patient importance of self efficacy and  on diet for CHF.   - Monitor clinical status closely. Monitor on telemetry.   - Monitor strict Is&Os and daily weights.    - Last BNP reviewed- and noted below Recent Labs   Lab 11/18/22  1334   BNP 2,255*       HLD (hyperlipidemia)  - atorvastatin (LIPITOR) 40 MG tablet daily     Renovascular hypertension  - isosorbide mononitrate (IMDUR) 30 MG 24 hr tablet BID   - NIFEdipine (ADALAT  CC) 120 MG TbSR daily   - hydrALAZINE (APRESOLINE) 100 MG tablet TID PRN   - carvediloL (COREG) 3.125 MG tablet BID   - cloNIDine 0.3 mg/24 hr td ptwk (CATAPRES) 0.3 mg/24 hr qweekly    Anemia in ESRD (end-stage renal disease)  - Current CBC reviewed-   Lab Results   Component Value Date    HGB 9.7 (L) 11/18/2022    HCT 33 (L) 11/18/2022     - Patient's anemia is currently controlled.   - Etiology likely d/t anemia of chronic disease, iron deficiency   - Monitor serial CBC and transfuse if patient becomes hemodynamically unstable, symptomatic or H/H drops below 7/21.     Chronic hypoxemic respiratory failure  Patient with Hypoxic Respiratory failure which is Chronic.  he is on home oxygen at 2-3 LPM. Supplemental oxygen was provided and noted. Labs and images were reviewed. Patient Has not had a recent ABG. Will treat underlying causes and adjust management of respiratory failure as follows-     - HD for volume removal  - lasix in the AM  - DuoNebs PRN  - fluticasone-salmeterol diskus inhaler 250-50 mcg BID  - tiotropium bromide (SPIRIVA RESPIMAT) 2.5 mcg/actuation inhaler daily     ESRD on dialysis  Chronic kidney disease-mineral and bone disorder  MWF schedule  Residual renal function?- Yes    - Nephrology consulted  - Continue chronic hemodialysis  - calcium acetate,phosphat bind, (PHOSLO) 667 mg capsule TID   - Monitor daily electrolytes and defer dialysis orders to nephrology    VTE Risk Mitigation (From admission, onward)    None             Verito Schuster PA-C  Department of Hospital Medicine   [facility]

## 2022-11-18 NOTE — CONSULTS
[facility]  Nephrology  Consult Note    Patient Name: Cosme Lewis  MRN: [id number]  Admission Date: 11/18/2022  Hospital Length of Stay: 0 days  Attending Provider: Jacinda Quintana MD   Primary Care Physician: Primary Doctor No  Principal Problem:<principal problem not specified>    Inpatient consult to Nephrology  Consult performed by: Abdulaziz Forbes NP  Consult ordered by: Jacinda Quintana MD  Reason for consult: ESRD on HD         Subjective:     HPI: Cosme Lewis is a 57 y.o. male with T1DM, ESRD on HD (MWF), HTN, HFpEF, HLD, chronic hypoxemic resp failure 2/2 COPD (on home O2), h/o DVT/PE on Eliquis transported by EMS from [facility] for shortness of breath. Patient presented to dialysis today, but was sent to the ED for evaluation due to SOB and abdominal distention. Patient last dialyzed on 11/16/22. Anuric at baseline. Patient endorses SOB that has been present for 2 days that has gotten progressively worse, as well as associated cough. He denies CP, fever, aches, chills, N/V/D, and abdominal pain. Nephrology was consulted for management of ESRD/HD.       Past Medical History:   Diagnosis Date    Chronic kidney disease-mineral and bone disorder 10/12/2022    COPD (chronic obstructive pulmonary disease)     Diabetes mellitus type 1     ESRD on dialysis     Hypertension     SOB (shortness of breath) 10/12/2022    Patient with history COPD treated with Ellipta the albuterol, fluticasone-salmeterol tachypneic in the ED saturating 94% and 6 L on nasal cannula, patient does not know how many  he uses it is in nursing home.    -duo nebs Q 4  Given that patient is a poor historian, and in the setting of left lower extremity edema and history of coagulopathy PE cannot be ruled out.  Will workup for possible infec       Past Surgical History:   Procedure Laterality Date    AV FISTULA PLACEMENT         Review of patient's allergies indicates:  No Known Allergies  Current  "Facility-Administered Medications   Medication Frequency    0.9%  NaCl infusion Once     Current Outpatient Medications   Medication    acetaminophen (TYLENOL) 650 MG TbSR    albuterol (PROVENTIL/VENTOLIN HFA) 90 mcg/actuation inhaler    apixaban (ELIQUIS) 5 mg Tab    apixaban (ELIQUIS) 5 mg Tab    aspirin (ECOTRIN) 81 MG EC tablet    atorvastatin (LIPITOR) 40 MG tablet    calcium acetate,phosphat bind, (PHOSLO) 667 mg capsule    carvediloL (COREG) 3.125 MG tablet    cetirizine (ZYRTEC) 10 MG tablet    cloNIDine 0.3 mg/24 hr td ptwk (CATAPRES) 0.3 mg/24 hr    FLUoxetine 40 MG capsule    fluticasone-salmeterol diskus inhaler 250-50 mcg    GLUCAGON EMERGENCY KIT, HUMAN, 1 mg injection    hydrALAZINE (APRESOLINE) 100 MG tablet    HYDROPHILIC CREAM TOP    insulin aspart (NOVOLOG FLEXPEN U-100 INSULIN SUBQ)    insulin aspart U-100 (NOVOLOG) 100 unit/mL (3 mL) InPn pen    insulin detemir U-100 (LEVEMIR FLEXTOUCH) 100 unit/mL (3 mL) SubQ InPn pen    isosorbide mononitrate (IMDUR) 30 MG 24 hr tablet    melatonin 3 mg TbDL    NIFEdipine (ADALAT CC) 60 MG TbSR    oxyCODONE-acetaminophen (PERCOCET) 5-325 mg per tablet    pen needle, diabetic 32 gauge x 5/32" Ndle    sodium chloride (OCEAN) 0.65 % nasal spray    tiotropium bromide (SPIRIVA RESPIMAT) 2.5 mcg/actuation inhaler    triamcinolone acetonide 0.025 % Lotn    vitamin renal formula, B-complex-vitamin c-folic acid, (NEPHROCAP) 1 mg Cap     Family History       Problem Relation (Age of Onset)    Diabetes Mother          Tobacco Use    Smoking status: Never    Smokeless tobacco: Never   Substance and Sexual Activity    Alcohol use: Never    Drug use: Not on file    Sexual activity: Not on file     Review of Systems   Constitutional: Negative.    HENT: Negative.     Eyes: Negative.    Respiratory:  Positive for cough and shortness of breath.    Cardiovascular: Negative.    Gastrointestinal: Negative.    Genitourinary:  Positive for decreased " urine volume (anuric at baseline).   Musculoskeletal: Negative.    Skin: Negative.    Neurological: Negative.    Psychiatric/Behavioral: Negative.     Objective:     Vital Signs (Most Recent):  Temp: 98.3 °F (36.8 °C) (11/18/22 1152)  Pulse: 79 (11/18/22 1323)  Resp: 20 (11/18/22 1323)  BP: 134/79 (11/18/22 1323)  SpO2: 95 % (11/18/22 1323)  O2 Device (Oxygen Therapy): nasal cannula (11/18/22 1323) Vital Signs (24h Range):  Temp:  [98.3 °F (36.8 °C)] 98.3 °F (36.8 °C)  Pulse:  [79-85] 79  Resp:  [20-22] 20  SpO2:  [95 %-100 %] 95 %  BP: (134-165)/(79-89) 134/79     Weight: 93 kg (205 lb) (11/18/22 1152)  Body mass index is 30.27 kg/m².  Body surface area is 2.13 meters squared.    No intake/output data recorded.    Physical Exam  Constitutional:       Appearance: He is ill-appearing.   HENT:      Head: Normocephalic and atraumatic.      Nose: Nose normal.      Mouth/Throat:      Mouth: Mucous membranes are moist.      Pharynx: Oropharynx is clear.   Eyes:      Conjunctiva/sclera: Conjunctivae normal.   Cardiovascular:      Rate and Rhythm: Normal rate.      Comments: LUE AVF with +thrill and +bruit   Pulmonary:      Breath sounds: Rales present.   Abdominal:      Hernia: A hernia is present.   Musculoskeletal:      Cervical back: Normal range of motion and neck supple.      Right lower leg: Edema present.      Left lower leg: Edema present.   Skin:     General: Skin is warm and dry.   Neurological:      General: No focal deficit present.      Mental Status: He is alert and oriented to person, place, and time.   Psychiatric:         Mood and Affect: Mood normal.         Behavior: Behavior normal.       Significant Labs:  CBC:   Recent Labs   Lab 11/18/22  1334 11/18/22  1341   WBC 5.40  --    RBC 3.36*  --    HGB 9.7*  --    HCT 31.1* 33*     --    MCV 93  --    MCH 28.9  --    MCHC 31.2*  --      CMP: No results for input(s): GLU, CALCIUM, ALBUMIN, PROT, NA, K, CO2, CL, BUN, CREATININE, ALKPHOS, ALT, AST,  BILITOT in the last 168 hours.  All labs within the past 24 hours have been reviewed.      Assessment/Plan:     Anemia in ESRD (end-stage renal disease)  -Transfuse for Hg <7.0    Chronic kidney disease-mineral and bone disorder  -Low phos diet     ESRD on dialysis  Outpatient HD Information:    -Outpatient HD unit: [facility]  -HD tx days: MWF  -HD tx time: 240mins  -Last HD tx prior to presentation: 11/16/22  -HD access: LUE AVF  -HD modality: iHD   -Residual urine: Anuric   -EDW: 79kg     Plan/Recommendations:    -HD today for metabolic clearance and volume management   -Renal diet  -Strict I/O's and daily weights  -Daily renal function panels   -Renally dose meds          Thank you for your consult. I will follow-up with patient. Please contact us if you have any additional questions.    Abdulaziz Forbes NP  Nephrology  [facility]

## 2022-11-18 NOTE — ED NOTES
Patient identifiers verified and correct for Cosme Lewis.   LOC: The patient is awake, alert and aware of environment with an appropriate affect, the patient is oriented x 3 and speaking appropriately.   APPEARANCE: Patient appears comfortable and in no acute distress, patient is clean but not groomed.  SKIN: The skin is warm and dry, color consistent with ethnicity, patient has delayed skin turgor and moist mucus membranes, skin intact, no breakdown or bruising noted.   MUSCULOSKELETAL: Patient moving all extremities spontaneously, swelling noted to bilateral lower extremities and eye lids.   RESPIRATORY: Airway is open and patent, respirations are spontaneous, patient has an increase in effort and rate, no accessory muscle use noted, pt placed on continuous pulse ox with O2 sats noted at 88% on room air. Patient placed on 4L NC sating at 97%.  CARDIAC: Pt placed on cardiac monitor. Patient has a normal rate and regular rhythm, edema noted to bilateral lower legs and eye lids, capillary refill < 3 seconds.   GASTRO: Soft and non tender to palpation, no distention noted, normoactive bowel sounds present in all four quadrants. Pt states bowel movements have been regular.  : Pt denies any pain or frequency with urination.  NEURO: Pt opens eyes spontaneously, behavior appropriate to situation, follows commands, facial expression symmetrical, bilateral hand grasp equal and even, purposeful motor response noted, normal sensation in all extremities when touched with a finger.

## 2022-11-18 NOTE — HPI
Cosme Lewis is a 57 y.o. male with T1DM, ESRD on HD (MWF), HTN, HFpEF, HLD, chronic hypoxemic resp failure 2/2 COPD (on home O2), h/o DVT/PE on Eliquis transported by EMS from [facility] for shortness of breath. Patient presented to dialysis today, but was sent to the ED for evaluation due to SOB and abdominal distention. Patient last dialyzed on 11/16/22. Anuric at baseline. Patient endorses SOB that has been present for 2 days that has gotten progressively worse, as well as associated cough. He denies CP, fever, aches, chills, N/V/D, and abdominal pain. Nephrology was consulted for management of ESRD/HD.

## 2022-11-18 NOTE — ASSESSMENT & PLAN NOTE
- CHF is currently uncontrolled due to Continued edema of extremities and hypervolemia in the setting of renal failure  - Latest echo 10/12/2022: EF 40%, (+)DD, CVP 15 mmHg, PAP 65mmHg  - Continue Beta Blocker    - home diuretic: n/a may benefit from lasix    - hospital diuresis: lasix 100 MG tomorrow AM   - carvediloL (COREG) 3.125 MG tablet BID   - Place on fluid restriction of 1.5 L. Continue to stress to patient importance of self efficacy and  on diet for CHF.   - Monitor clinical status closely. Monitor on telemetry.   - Monitor strict Is&Os and daily weights.    - Last BNP reviewed- and noted below Recent Labs   Lab 11/18/22  1334   BNP 2,255*

## 2022-11-18 NOTE — SUBJECTIVE & OBJECTIVE
Past Medical History:   Diagnosis Date    Chronic kidney disease-mineral and bone disorder 10/12/2022    COPD (chronic obstructive pulmonary disease)     Diabetes mellitus type 1     ESRD on dialysis     Hypertension     SOB (shortness of breath) 10/12/2022    Patient with history COPD treated with Ellipta the albuterol, fluticasone-salmeterol tachypneic in the ED saturating 94% and 6 L on nasal cannula, patient does not know how many  he uses it is in nursing home.    -duo nebs Q 4  Given that patient is a poor historian, and in the setting of left lower extremity edema and history of coagulopathy PE cannot be ruled out.  Will workup for possible infec       Past Surgical History:   Procedure Laterality Date    AV FISTULA PLACEMENT         Review of patient's allergies indicates:  No Known Allergies  Current Facility-Administered Medications   Medication Frequency    0.9%  NaCl infusion Once     Current Outpatient Medications   Medication    acetaminophen (TYLENOL) 650 MG TbSR    albuterol (PROVENTIL/VENTOLIN HFA) 90 mcg/actuation inhaler    apixaban (ELIQUIS) 5 mg Tab    apixaban (ELIQUIS) 5 mg Tab    aspirin (ECOTRIN) 81 MG EC tablet    atorvastatin (LIPITOR) 40 MG tablet    calcium acetate,phosphat bind, (PHOSLO) 667 mg capsule    carvediloL (COREG) 3.125 MG tablet    cetirizine (ZYRTEC) 10 MG tablet    cloNIDine 0.3 mg/24 hr td ptwk (CATAPRES) 0.3 mg/24 hr    FLUoxetine 40 MG capsule    fluticasone-salmeterol diskus inhaler 250-50 mcg    GLUCAGON EMERGENCY KIT, HUMAN, 1 mg injection    hydrALAZINE (APRESOLINE) 100 MG tablet    HYDROPHILIC CREAM TOP    insulin aspart (NOVOLOG FLEXPEN U-100 INSULIN SUBQ)    insulin aspart U-100 (NOVOLOG) 100 unit/mL (3 mL) InPn pen    insulin detemir U-100 (LEVEMIR FLEXTOUCH) 100 unit/mL (3 mL) SubQ InPn pen    isosorbide mononitrate (IMDUR) 30 MG 24 hr tablet    melatonin 3 mg TbDL    NIFEdipine (ADALAT CC) 60 MG TbSR    oxyCODONE-acetaminophen (PERCOCET) 5-325 mg per tablet     "pen needle, diabetic 32 gauge x 5/32" Ndle    sodium chloride (OCEAN) 0.65 % nasal spray    tiotropium bromide (SPIRIVA RESPIMAT) 2.5 mcg/actuation inhaler    triamcinolone acetonide 0.025 % Lotn    vitamin renal formula, B-complex-vitamin c-folic acid, (NEPHROCAP) 1 mg Cap     Family History       Problem Relation (Age of Onset)    Diabetes Mother          Tobacco Use    Smoking status: Never    Smokeless tobacco: Never   Substance and Sexual Activity    Alcohol use: Never    Drug use: Not on file    Sexual activity: Not on file     Review of Systems   Constitutional: Negative.    HENT: Negative.     Eyes: Negative.    Respiratory:  Positive for cough and shortness of breath.    Cardiovascular: Negative.    Gastrointestinal: Negative.    Genitourinary:  Positive for decreased urine volume (anuric at baseline).   Musculoskeletal: Negative.    Skin: Negative.    Neurological: Negative.    Psychiatric/Behavioral: Negative.     Objective:     Vital Signs (Most Recent):  Temp: 98.3 °F (36.8 °C) (11/18/22 1152)  Pulse: 79 (11/18/22 1323)  Resp: 20 (11/18/22 1323)  BP: 134/79 (11/18/22 1323)  SpO2: 95 % (11/18/22 1323)  O2 Device (Oxygen Therapy): nasal cannula (11/18/22 1323) Vital Signs (24h Range):  Temp:  [98.3 °F (36.8 °C)] 98.3 °F (36.8 °C)  Pulse:  [79-85] 79  Resp:  [20-22] 20  SpO2:  [95 %-100 %] 95 %  BP: (134-165)/(79-89) 134/79     Weight: 93 kg (205 lb) (11/18/22 1152)  Body mass index is 30.27 kg/m².  Body surface area is 2.13 meters squared.    No intake/output data recorded.    Physical Exam  Constitutional:       Appearance: He is ill-appearing.   HENT:      Head: Normocephalic and atraumatic.      Nose: Nose normal.      Mouth/Throat:      Mouth: Mucous membranes are moist.      Pharynx: Oropharynx is clear.   Eyes:      Conjunctiva/sclera: Conjunctivae normal.   Cardiovascular:      Rate and Rhythm: Normal rate.      Comments: LUE AVF with +thrill and +bruit   Pulmonary:      Breath sounds: Rales " present.   Abdominal:      Hernia: A hernia is present.   Musculoskeletal:      Cervical back: Normal range of motion and neck supple.      Right lower leg: Edema present.      Left lower leg: Edema present.   Skin:     General: Skin is warm and dry.   Neurological:      General: No focal deficit present.      Mental Status: He is alert and oriented to person, place, and time.   Psychiatric:         Mood and Affect: Mood normal.         Behavior: Behavior normal.       Significant Labs:  CBC:   Recent Labs   Lab 11/18/22  1334 11/18/22  1341   WBC 5.40  --    RBC 3.36*  --    HGB 9.7*  --    HCT 31.1* 33*     --    MCV 93  --    MCH 28.9  --    MCHC 31.2*  --      CMP: No results for input(s): GLU, CALCIUM, ALBUMIN, PROT, NA, K, CO2, CL, BUN, CREATININE, ALKPHOS, ALT, AST, BILITOT in the last 168 hours.  All labs within the past 24 hours have been reviewed.

## 2022-11-18 NOTE — ASSESSMENT & PLAN NOTE
Patient with Hypoxic Respiratory failure which is Chronic.  he is on home oxygen at 2-3 LPM. Supplemental oxygen was provided and noted. Labs and images were reviewed. Patient Has not had a recent ABG. Will treat underlying causes and adjust management of respiratory failure as follows-     - HD for volume removal  - lasix in the AM  - DuoNebs PRN  - fluticasone-salmeterol diskus inhaler 250-50 mcg BID  - tiotropium bromide (SPIRIVA RESPIMAT) 2.5 mcg/actuation inhaler daily

## 2022-11-18 NOTE — ED PROVIDER NOTES
Encounter Date: 11/18/2022       History     Chief Complaint   Patient presents with    Shortness of Breath     Lives NH, was at dialysis today and they refused, stated he as in fluid overload and needed ER eval. Periorbital edema, SARI LE edema, SOB. EMS state 88% RA, 100% 2 L/min. States O2 use at baseline.      56 y/o M with T1DM, ESRD on HD (MWF), HTN, HFpEF, HLD, chronic hypoxemic resp failure 2/2 COPD (on home O2), h/o DVT/PE on Eliquis transported by EMS from [facility] for shortness of breath.  Patient states he is due for hemodialysis today.  He is overall been feeling unwell for the past 2 days with generalized weakness and productive cough.  He denies fevers or chills.  No chest pain.  He is on chronic home O2 at 2 L.  He went to dialysis today and they noticed he had eyelid swelling, abdominal distention and worsening shortness of breath on oxygen.  They felt that he needed to come to emergency department for evaluation.  Patient still makes urine, approximately 2 times per day.  Denies dysuria, hematuria.    Review of patient's allergies indicates:  No Known Allergies  Past Medical History:   Diagnosis Date    Chronic kidney disease-mineral and bone disorder 10/12/2022    COPD (chronic obstructive pulmonary disease)     Diabetes mellitus type 1     ESRD on dialysis     Hypertension     SOB (shortness of breath) 10/12/2022    Patient with history COPD treated with Ellipta the albuterol, fluticasone-salmeterol tachypneic in the ED saturating 94% and 6 L on nasal cannula, patient does not know how many  he uses it is in nursing home.    -duo nebs Q 4  Given that patient is a poor historian, and in the setting of left lower extremity edema and history of coagulopathy PE cannot be ruled out.  Will workup for possible infec     Past Surgical History:   Procedure Laterality Date    AV FISTULA PLACEMENT       Family History   Problem Relation Age of Onset    Diabetes Mother      Social History      Tobacco Use    Smoking status: Never    Smokeless tobacco: Never   Substance Use Topics    Alcohol use: Never     Review of Systems   Constitutional:  Negative for chills, diaphoresis and fever.   HENT:  Negative for sore throat.    Respiratory:  Positive for cough and shortness of breath.    Cardiovascular:  Negative for chest pain.   Gastrointestinal:  Negative for nausea.   Genitourinary:         Urinates approximately 2 times a day   Musculoskeletal:  Negative for back pain.   Skin:  Negative for rash.   Neurological:  Positive for weakness.   Hematological:  Does not bruise/bleed easily.     Physical Exam     Initial Vitals [11/18/22 1152]   BP Pulse Resp Temp SpO2   (!) 165/89 85 (!) 22 98.3 °F (36.8 °C) 100 %      MAP       --         Physical Exam    Nursing note and vitals reviewed.  Constitutional: No distress.   Chronically ill-appearing   HENT:   Dry mucous membrane   Eyes: Conjunctivae are normal.   Bilateral upper eyelid edema   Neck: Neck supple. JVD present.   Cardiovascular:  Normal rate, regular rhythm and intact distal pulses.           Left upper extremity AV fistula positive thrill and pulse   Pulmonary/Chest: He has rales.   No retractions, on 2 L nasal cannula with oxygen saturation 92%.  Appears dyspneic   Abdominal: Abdomen is soft. Bowel sounds are normal. He exhibits distension. There is no abdominal tenderness.   Musculoskeletal:         General: Edema (2+ to the knee) present.      Cervical back: Neck supple.     Lymphadenopathy:     He has no cervical adenopathy.   Neurological: He is alert and oriented to person, place, and time. He has normal strength.   Skin: No rash noted.   Bilateral chronic skin changes   Psychiatric: He has a normal mood and affect.       ED Course   Procedures  Labs Reviewed   CBC W/ AUTO DIFFERENTIAL - Abnormal; Notable for the following components:       Result Value    RBC 3.36 (*)     Hemoglobin 9.7 (*)     Hematocrit 31.1 (*)     MCHC 31.2 (*)     RDW  17.3 (*)     Lymph # 0.7 (*)     Lymph % 12.8 (*)     All other components within normal limits   COMPREHENSIVE METABOLIC PANEL - Abnormal; Notable for the following components:    Sodium 134 (*)     Chloride 93 (*)     Glucose 151 (*)     BUN 29 (*)     Creatinine 4.9 (*)     Calcium 7.8 (*)     Albumin 2.7 (*)     Alkaline Phosphatase 214 (*)     eGFR 12.9 (*)     All other components within normal limits   TROPONIN I - Abnormal; Notable for the following components:    Troponin I 0.035 (*)     All other components within normal limits   B-TYPE NATRIURETIC PEPTIDE - Abnormal; Notable for the following components:    BNP 2,255 (*)     All other components within normal limits   ISTAT PROCEDURE - Abnormal; Notable for the following components:    POC Glucose 152 (*)     POC BUN 32 (*)     POC Creatinine 5.3 (*)     POC Sodium 133 (*)     POC Chloride 92 (*)     POC TCO2 (MEASURED) 31 (*)     POC Ionized Calcium 0.94 (*)     POC Hematocrit 33 (*)     All other components within normal limits   PHOSPHORUS   PHOSPHORUS    Narrative:     add on phos per Crystal Forbes NP order# [id number] 11/18/2022 @   14:25    SARS-COV2 (COVID) WITH FLU/RSV BY PCR   ISTAT CHEM8     EKG Readings: (Independently Interpreted)   EKG:     Imaging Results              X-Ray Chest AP Portable (Final result)  Result time 11/18/22 14:07:39      Final result by Nazario Fernandes MD (11/18/22 14:07:39)                   Impression:      Findings suggesting pulmonary edema/CHF pattern and trace bilateral pleural effusions, to a lesser degree from prior.  Other nonspecific infectious/inflammatory pneumonitis could present similarly.      Electronically signed by: Nazario Fernandes MD  Date:    11/18/2022  Time:    14:07               Narrative:    EXAMINATION:  XR CHEST AP PORTABLE    CLINICAL HISTORY:  Shortness of breath    TECHNIQUE:  Single frontal view of the chest was performed.    COMPARISON:  Chest radiograph and CT thorax  10/12/2022    FINDINGS:  Monitoring leads overlie the chest.  Patient is slightly rotated.    Cardiomediastinal silhouette is midline and prominent similar to prior.  There is continued prominence of the central pulmonary vasculature with bilateral diffuse nonspecific interstitial coarsening and patchy ground-glass opacities suggesting pulmonary edema/CHF pattern, slightly lesser degree from prior.  Suspected trace bilateral pleural effusions, decreased in volume from prior.  Scattered bandlike opacities at the lung bases consistent with platelike scarring versus atelectasis.  No large consolidation or pneumothorax.  No acute osseous process seen.  PA and lateral views can be obtained.                                    X-Rays:   Independently Interpreted Readings:   Other Readings:  Chest x-ray:  Bilateral pulmonary edema with trace bilateral pleural effusions.  Cardiomegaly.  No large consolidation  Medications   0.9%  NaCl infusion (has no administration in time range)     Medical Decision Making:   History:   Old Medical Records: I decided to obtain old medical records.  Old Records Summarized: records from clinic visits.       <> Summary of Records: Last admission on 10/13 for acute hypoxemic respiratory failure, acute PE discharged on Eliquis.  Initial Assessment:   Emergent evaluation a 57-year-old male presenting today with shortness of breath, hypovolemia from dialysis center.  Currently on 2 L nasal cannula with baseline saturation 92%.  He does appear edematous with bilateral eye swelling, abdominal distention and leg swelling.    Differential Diagnosis:   Hypervolemia secondary to decompensated heart failure, missed dialysis, electrolyte derangement, pneumonia, COVID, flu  Independently Interpreted Test(s):   I have ordered and independently interpreted X-rays - see prior notes.  I have ordered and independently interpreted EKG Reading(s) - see prior notes  Clinical Tests:   Lab Tests: Reviewed and  Ordered  Radiological Study: Ordered and Reviewed  Medical Tests: Ordered and Reviewed  ED Management:  - labs  - EKG  - CXR  - viral panel  - nephrology consult for HD  - plan obs to            ED Course as of 11/18/22 1447   Fri Nov 18, 2022   1403 POC BUN(!): 32 [GM]   1403 POC Creatinine(!): 5.3 [GM]   1403 Hemoglobin(!): 9.7 [GM]   1425 BNP(!): 2,255  Near baseline   [GM]   1425 Troponin I(!): 0.035  Near baseline   [GM]   1425 BUN(!): 29 [GM]   1426 Creatinine(!): 4.9 [GM]   1438 Will place in observation under hospital medicine for HD, continued respiratory monitoring.     [GM]      ED Course User Index  [GM] Jacinda Quintana MD                 Clinical Impression:   Final diagnoses:  [R06.02] SOB (shortness of breath)  [E87.70] Hypervolemia, unspecified hypervolemia type (Primary)      ED Disposition Condition    Observation Stable                Jacinda Quintana MD  11/18/22 1446

## 2022-11-18 NOTE — ASSESSMENT & PLAN NOTE
- Current CBC reviewed-   Lab Results   Component Value Date    HGB 9.7 (L) 11/18/2022    HCT 33 (L) 11/18/2022     - Patient's anemia is currently controlled.   - Etiology likely d/t anemia of chronic disease, iron deficiency   - Monitor serial CBC and transfuse if patient becomes hemodynamically unstable, symptomatic or H/H drops below 7/21.

## 2022-11-18 NOTE — HPI
Cosme Lewis is a 57 y.o. 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 Per ED MD, the facility believed he appeared more overloaded than normal and therefore sent him to the ED. The patient reports he was at his mother's home before being picked up by transportation. He states he passed out in while in transport and never arrived at the facility, he does not seem like the most reliable historian. He reports using supplemental oxygen at baseline, unsure of how many liters. He denies chest pain, worsening shortness of breath, diarrhea, abdominal pain, cough or sore throat. He has no awareness of his medications. He states he lives in a facility for people who are 55 and older. He denies tobacco and alcohol use.     ED: afebrile without leukocytosis.nephrology was consulted for HD. VSS. BNP 2,255. Troponin 0.035. admitted to hospital medicine for further management.

## 2022-11-18 NOTE — SUBJECTIVE & OBJECTIVE
Past Medical History:   Diagnosis Date    Chronic kidney disease-mineral and bone disorder 10/12/2022    COPD (chronic obstructive pulmonary disease)     Diabetes mellitus type 1     ESRD on dialysis     Hypertension     SOB (shortness of breath) 10/12/2022    Patient with history COPD treated with Ellipta the albuterol, fluticasone-salmeterol tachypneic in the ED saturating 94% and 6 L on nasal cannula, patient does not know how many  he uses it is in nursing home.    -duo nebs Q 4  Given that patient is a poor historian, and in the setting of left lower extremity edema and history of coagulopathy PE cannot be ruled out.  Will workup for possible infec       Past Surgical History:   Procedure Laterality Date    AV FISTULA PLACEMENT         Review of patient's allergies indicates:  No Known Allergies    No current facility-administered medications on file prior to encounter.     Current Outpatient Medications on File Prior to Encounter   Medication Sig    acetaminophen (TYLENOL) 650 MG TbSR Take 650 mg by mouth every 8 (eight) hours as needed (pain or fever over 100.4).    albuterol (PROVENTIL/VENTOLIN HFA) 90 mcg/actuation inhaler Inhale 2 puffs into the lungs every 6 (six) hours as needed for Wheezing or Shortness of Breath.    apixaban (ELIQUIS) 5 mg Tab Take 2 tablets (10 mg total) by mouth 2 (two) times daily for 7 days, THEN 1 tablet (5 mg total) 2 (two) times daily.    apixaban (ELIQUIS) 5 mg Tab Take 1 tablet (5 mg total) by mouth 2 (two) times daily. Start this dose AFTER you have completed the 7 days of the 10 mg dose.    aspirin (ECOTRIN) 81 MG EC tablet Take 81 mg by mouth once daily.    atorvastatin (LIPITOR) 40 MG tablet Take 1 tablet (40 mg total) by mouth once daily.    calcium acetate,phosphat bind, (PHOSLO) 667 mg capsule Take 667 mg by mouth 3 (three) times daily.    carvediloL (COREG) 3.125 MG tablet Take 1 tablet (3.125 mg total) by mouth 2 (two) times daily.    cetirizine (ZYRTEC) 10 MG tablet  "Take 10 mg by mouth daily as needed (itching).    cloNIDine 0.3 mg/24 hr td ptwk (CATAPRES) 0.3 mg/24 hr Place 1 patch onto the skin every Saturday.    FLUoxetine 40 MG capsule Take 40 mg by mouth once daily.    fluticasone-salmeterol diskus inhaler 250-50 mcg Inhale 1 puff into the lungs 2 (two) times daily.    GLUCAGON EMERGENCY KIT, HUMAN, 1 mg injection 1 mg into the muscle as needed if CBG < 70    hydrALAZINE (APRESOLINE) 100 MG tablet Take 100 mg by mouth 3 (three) times daily as needed (blood pressure  greater than 190/100).    HYDROPHILIC CREAM TOP Apply topically. Apply liberal amount to affected area twice daily for dry skin    insulin aspart (NOVOLOG FLEXPEN U-100 INSULIN SUBQ) Inject into the skin before meals and at bedtime per sliding scale: 0-60= OJ or glucose tab; = 0; 201-250= 2; 251-300= 4; 301-350= 6; 351-400= 8; greater than 400= 10 units then call MD    insulin aspart U-100 (NOVOLOG) 100 unit/mL (3 mL) InPn pen Inject 10 Units into the skin 3 (three) times daily with meals. Max 30 units per day    insulin detemir U-100 (LEVEMIR FLEXTOUCH) 100 unit/mL (3 mL) SubQ InPn pen Inject 15 Units into the skin 2 (two) times daily. Once in the morning and once before bedtime. Max 30 units per day    isosorbide mononitrate (IMDUR) 30 MG 24 hr tablet Take 30 mg by mouth 2 (two) times daily.    melatonin 3 mg TbDL Take 9 mg by mouth nightly as needed (sleep).    NIFEdipine (ADALAT CC) 60 MG TbSR Take 120 mg by mouth every evening.    oxyCODONE-acetaminophen (PERCOCET) 5-325 mg per tablet Take 1 tablet by mouth every 6 (six) hours as needed for Pain.    pen needle, diabetic 32 gauge x 5/32" Ndle Use to inject 1 each into the skin 5 (five) times daily.    sodium chloride (OCEAN) 0.65 % nasal spray 2 sprays by Nasal route every 4 (four) hours as needed for Congestion.    tiotropium bromide (SPIRIVA RESPIMAT) 2.5 mcg/actuation inhaler Inhale 2 puffs into the lungs Daily.    triamcinolone acetonide 0.025 % " Lotn Apply topically. Apply to the affected area twice daily    vitamin renal formula, B-complex-vitamin c-folic acid, (NEPHROCAP) 1 mg Cap Take 1 capsule by mouth once daily.     Family History       Problem Relation (Age of Onset)    Diabetes Mother          Tobacco Use    Smoking status: Never    Smokeless tobacco: Never   Substance and Sexual Activity    Alcohol use: Never    Drug use: Not on file    Sexual activity: Not on file     Review of Systems   Constitutional:  Negative for chills and fever.   HENT:  Negative for congestion and drooling.    Eyes:  Negative for photophobia and visual disturbance.   Respiratory:  Positive for shortness of breath. Negative for cough.    Cardiovascular:  Positive for leg swelling. Negative for chest pain and palpitations.   Gastrointestinal:  Negative for abdominal distention and abdominal pain.   Endocrine: Negative for cold intolerance and heat intolerance.   Genitourinary:  Negative for difficulty urinating and dysuria.   Musculoskeletal:  Negative for back pain and gait problem.   Skin:  Negative for color change and pallor.   Allergic/Immunologic: Negative for immunocompromised state.   Neurological:  Negative for dizziness and headaches.   Psychiatric/Behavioral:  Positive for agitation. Negative for behavioral problems.    Objective:     Vital Signs (Most Recent):  Temp: 98.3 °F (36.8 °C) (11/18/22 1152)  Pulse: 83 (11/18/22 1445)  Resp: 18 (11/18/22 1445)  BP: (!) 149/84 (11/18/22 1430)  SpO2: 97 % (11/18/22 1445)   Vital Signs (24h Range):  Temp:  [98.3 °F (36.8 °C)] 98.3 °F (36.8 °C)  Pulse:  [79-85] 83  Resp:  [11-22] 18  SpO2:  [93 %-100 %] 97 %  BP: (134-165)/(75-89) 149/84     Weight: 93 kg (205 lb)  Body mass index is 30.27 kg/m².    Physical Exam  Vitals and nursing note reviewed.   Constitutional:       Appearance: He is obese.   HENT:      Head: Normocephalic.      Mouth/Throat:      Mouth: Mucous membranes are moist.   Eyes:      General: No scleral  icterus.     Pupils: Pupils are equal, round, and reactive to light.   Cardiovascular:      Rate and Rhythm: Normal rate and regular rhythm.      Pulses: Normal pulses.   Abdominal:      General: Abdomen is flat. There is no distension.      Tenderness: There is abdominal tenderness.   Musculoskeletal:         General: Normal range of motion.      Cervical back: Normal range of motion.      Right lower leg: Edema present.      Left lower leg: Edema present.   Skin:     General: Skin is warm and dry.      Capillary Refill: Capillary refill takes less than 2 seconds.      Coloration: Skin is not jaundiced.   Neurological:      General: No focal deficit present.      Mental Status: He is alert and oriented to person, place, and time.      Cranial Nerves: No cranial nerve deficit.   Psychiatric:         Mood and Affect: Mood normal.         Behavior: Behavior normal.         CRANIAL NERVES     CN III, IV, VI   Pupils are equal, round, and reactive to light.     Significant Labs: All pertinent labs within the past 24 hours have been reviewed.    Significant Imaging: I have reviewed all pertinent imaging results/findings within the past 24 hours.

## 2022-11-19 PROBLEM — R45.1 AGITATION: Status: ACTIVE | Noted: 2022-01-01

## 2022-11-19 NOTE — PROGRESS NOTES
Pt arrived to unit via stretcher, awake and alert with O2@6L/NC in place. HD tx initiated, tolerated well, flows good. Lines secured, tele in place. Blood obtained for labs ordered,  called for . UF goal 3 L as tolerated

## 2022-11-19 NOTE — PROGRESS NOTES
[facility]H  Nephrology  Progress Note    Patient Name: Cosme Lewis  MRN: [id number]  Admission Date: 11/18/2022  Hospital Length of Stay: 0 days  Attending Provider: Britt Mason MD   Primary Care Physician: Primary Doctor No  Principal Problem:Acute HFrEF (heart failure with reduced ejection fraction)    Subjective:     HPI: Cosme Lewis is a 57 y.o. male with T1DM, ESRD on HD (MWF), HTN, HFpEF, HLD, chronic hypoxemic resp failure 2/2 COPD (on home O2), h/o DVT/PE on Eliquis transported by EMS from [facility] dialysis [place] for shortness of breath. Patient presented to dialysis today, but was sent to the ED for evaluation due to SOB and abdominal distention. Patient last dialyzed on 11/16/22. Anuric at baseline. Patient endorses SOB that has been present for 2 days that has gotten progressively worse, as well as associated cough. He denies CP, fever, aches, chills, N/V/D, and abdominal pain. Nephrology was consulted for management of ESRD/HD.       Interval History:   HD completed this morning with 3L net fluid removal.  Remains hypervolemic.  No respiratory distress on exam this morning.  Reported to have confusion overnight and this morning, but responding appropriately on my exam.       Review of patient's allergies indicates:  No Known Allergies  Current Facility-Administered Medications   Medication Frequency    0.9%  NaCl infusion Once    aspirin EC tablet 81 mg Daily    atorvastatin tablet 40 mg Daily    calcium acetate(phosphat bind) capsule 667 mg TID WM    carvediloL tablet 3.125 mg BID    divalproex capsule 125 mg Once    FLUoxetine capsule 40 mg Daily    fluticasone furoate-vilanteroL 100-25 mcg/dose diskus inhaler 1 puff Daily    glucagon (human recombinant) injection 1 mg PRN    glucose chewable tablet 16 g PRN    glucose chewable tablet 24 g PRN    insulin aspart U-100 pen 5 Units TIDWM    insulin detemir U-100 pen 12 Units Daily    isosorbide mononitrate 24 hr tablet 30  mg BID    NIFEdipine 24 hr tablet 60 mg Daily    tiotropium bromide 2.5 mcg/actuation inhaler 2 puff Daily       Objective:     Vital Signs (Most Recent):  Temp: 98.1 °F (36.7 °C) (11/19/22 0738)  Pulse: 84 (11/19/22 0857)  Resp: 18 (11/19/22 0857)  BP: 126/67 (11/19/22 0738)  SpO2: 98 % (11/19/22 0738)  O2 Device (Oxygen Therapy): (S) room air (found on room air) (11/19/22 0857) Vital Signs (24h Range):  Temp:  [97.8 °F (36.6 °C)-98.5 °F (36.9 °C)] 98.1 °F (36.7 °C)  Pulse:  [74-92] 84  Resp:  [11-22] 18  SpO2:  [89 %-100 %] 98 %  BP: (116-165)/(59-89) 126/67     Weight: 85 kg (187 lb 6.3 oz) (11/18/22 1816)  Body mass index is 27.67 kg/m².  Body surface area is 2.03 meters squared.    I/O last 3 completed shifts:  In: 500 [Other:500]  Out: 3500 [Other:3500]    Physical Exam  Constitutional:       Appearance: He is ill-appearing.   HENT:      Head: Normocephalic and atraumatic.      Nose: Nose normal.      Mouth/Throat:      Mouth: Mucous membranes are moist.      Pharynx: Oropharynx is clear.   Eyes:      Conjunctiva/sclera: Conjunctivae normal.   Cardiovascular:      Rate and Rhythm: Normal rate.      Comments: LUE AVF with +thrill and +bruit   Pulmonary:      Breath sounds: Rales present.   Abdominal:      General: There is distension.      Tenderness: There is no abdominal tenderness.   Musculoskeletal:      Cervical back: Normal range of motion and neck supple.      Right lower leg: Edema present.      Left lower leg: Edema present.   Skin:     General: Skin is warm and dry.   Neurological:      General: No focal deficit present.      Mental Status: He is alert and oriented to person, place, and time.   Psychiatric:         Mood and Affect: Mood normal.         Behavior: Behavior normal.       Significant Labs:  CBC:   Recent Labs   Lab 11/18/22  1334 11/18/22  1341   WBC 5.40  --    RBC 3.36*  --    HGB 9.7*  --    HCT 31.1* 33*     --    MCV 93  --    MCH 28.9  --    MCHC 31.2*  --      CMP:    Recent Labs   Lab 11/18/22  1334   *   CALCIUM 7.8*   ALBUMIN 2.7*   PROT 6.5   *   K 4.2   CO2 26   CL 93*   BUN 29*   CREATININE 4.9*   ALKPHOS 214*   ALT 25   AST 37   BILITOT 0.7          Assessment/Plan:     ESRD on dialysis  Outpatient HD Information:    -Outpatient HD unit: [facility]  -HD tx days: MWF  -HD tx time: 240mins  -Last HD tx prior to presentation: 11/16/22  -HD access: LUE AVF  -HD modality: iHD   -Residual urine: Anuric   -EDW: 79kg     Plan/Recommendations:    -HD again today for metabolic clearance and volume management   -Renal diet  -Strict I/O's and daily weights  -Daily renal function panels   -Renally dose meds        Juventino Nguyen NP  Nephrology  [facility]

## 2022-11-19 NOTE — NURSING
Upon rounding, pt tachypneic with respirations 27. Nasal cannula found behind patient's head. Pt O2 sats on 81% RA. Nasal cannula replaced at 6L but pt only at 84%. NRB placed on pt at 10L. Pt back to 99%    2339: pt titrated back down to 6L NC. Pt re-educated on the importance of keeping nasal cannula in place. Still awaiting dialysis.

## 2022-11-19 NOTE — PLAN OF CARE
Pt removed telemetry leads tele room and md notified encourage pt to allow me to put it back pt refused. Pt removed IV access md notified and attempt to put new IV in pt refused. Pt went to dialysis and upon return new order for IV teams to attempt to put in new line. Attempt to wean pt off 6 liters of 02 unsuccessful. Will try again

## 2022-11-19 NOTE — PROGRESS NOTES
HD tx complete, tolerated well. 3 L removed over 3 hours.blood returned via UMA AVF, 15g needles removed x2. Gauze and tape applied to each site, pressure held for 10 minutes, hemostasis achieved. Report given to primary nurse. Transferred from unit via stretcher by transport back to room

## 2022-11-19 NOTE — SUBJECTIVE & OBJECTIVE
Interval History:   HD completed this morning with 3L net fluid removal.  Remains hypervolemic.  No respiratory distress on exam this morning.  Reported to have confusion overnight and this morning, but responding appropriately on my exam.       Review of patient's allergies indicates:  No Known Allergies  Current Facility-Administered Medications   Medication Frequency    0.9%  NaCl infusion Once    aspirin EC tablet 81 mg Daily    atorvastatin tablet 40 mg Daily    calcium acetate(phosphat bind) capsule 667 mg TID WM    carvediloL tablet 3.125 mg BID    divalproex capsule 125 mg Once    FLUoxetine capsule 40 mg Daily    fluticasone furoate-vilanteroL 100-25 mcg/dose diskus inhaler 1 puff Daily    glucagon (human recombinant) injection 1 mg PRN    glucose chewable tablet 16 g PRN    glucose chewable tablet 24 g PRN    insulin aspart U-100 pen 5 Units TIDWM    insulin detemir U-100 pen 12 Units Daily    isosorbide mononitrate 24 hr tablet 30 mg BID    NIFEdipine 24 hr tablet 60 mg Daily    tiotropium bromide 2.5 mcg/actuation inhaler 2 puff Daily       Objective:     Vital Signs (Most Recent):  Temp: 98.1 °F (36.7 °C) (11/19/22 0738)  Pulse: 84 (11/19/22 0857)  Resp: 18 (11/19/22 0857)  BP: 126/67 (11/19/22 0738)  SpO2: 98 % (11/19/22 0738)  O2 Device (Oxygen Therapy): (S) room air (found on room air) (11/19/22 0857) Vital Signs (24h Range):  Temp:  [97.8 °F (36.6 °C)-98.5 °F (36.9 °C)] 98.1 °F (36.7 °C)  Pulse:  [74-92] 84  Resp:  [11-22] 18  SpO2:  [89 %-100 %] 98 %  BP: (116-165)/(59-89) 126/67     Weight: 85 kg (187 lb 6.3 oz) (11/18/22 1816)  Body mass index is 27.67 kg/m².  Body surface area is 2.03 meters squared.    I/O last 3 completed shifts:  In: 500 [Other:500]  Out: 3500 [Other:3500]    Physical Exam  Constitutional:       Appearance: He is ill-appearing.   HENT:      Head: Normocephalic and atraumatic.      Nose: Nose normal.      Mouth/Throat:      Mouth: Mucous membranes are moist.      Pharynx:  Oropharynx is clear.   Eyes:      Conjunctiva/sclera: Conjunctivae normal.   Cardiovascular:      Rate and Rhythm: Normal rate.      Comments: LUE AVF with +thrill and +bruit   Pulmonary:      Breath sounds: Rales present.   Abdominal:      General: There is distension.      Tenderness: There is no abdominal tenderness.   Musculoskeletal:      Cervical back: Normal range of motion and neck supple.      Right lower leg: Edema present.      Left lower leg: Edema present.   Skin:     General: Skin is warm and dry.   Neurological:      General: No focal deficit present.      Mental Status: He is alert and oriented to person, place, and time.   Psychiatric:         Mood and Affect: Mood normal.         Behavior: Behavior normal.       Significant Labs:  CBC:   Recent Labs   Lab 11/18/22  1334 11/18/22  1341   WBC 5.40  --    RBC 3.36*  --    HGB 9.7*  --    HCT 31.1* 33*     --    MCV 93  --    MCH 28.9  --    MCHC 31.2*  --      CMP:   Recent Labs   Lab 11/18/22  1334   *   CALCIUM 7.8*   ALBUMIN 2.7*   PROT 6.5   *   K 4.2   CO2 26   CL 93*   BUN 29*   CREATININE 4.9*   ALKPHOS 214*   ALT 25   AST 37   BILITOT 0.7

## 2022-11-19 NOTE — NURSING
1805 - Patient arrival into 1110, 6L NC satting 89%, OROPEZA noted, Aox4, tele on, LAVF with thrill/bruit, abd distended, no wounds but skin is flaky and dry. . Awaiting dialysis

## 2022-11-19 NOTE — NURSING
Pt refusing telemetry. Staff having to continually replace nasal cannula on patient. Pt re-educated on the importance of keeping oxygen in place. Pt sitting at edge of bed asking for breakfast and and using profanities towards staff. Safety precautions in place.

## 2022-11-19 NOTE — HOSPITAL COURSE
Cosme Lewis was admitted to [facility] medicine for management of volume overload in the setting of missed HD. Nephro consulted for HD management. Patient with confusion and agitation overnight, pulled out IV and pulled off telemetry. Given oral Depakote for non-redirectable agitation in setting of prolonged QT. Continued to appear fluid overloaded on exam, required several episodes of HD. Increased Nifedipine to 90 mg qD due to hypertension. Increased prandial and long acting insulin regimen due to continued hyperglycemia. Holding phosphate binder per Nephrology. Resumed home Hydralazine. Echo with EF of 63%, grade II left ventricular diastolic dysfunction, CVP of 15 mmHg, and moderate-severe pulmonary hypertension. Persistently volume overloaded even with several serial HD sessions. Nephrology consulted, recommended repeat CXR to determine if pleural effusions are improving or worsening. CXR with a small amount of fluid in the right interlobar fissure and slightly blunted costophrenic angles noted posteriorly. Slightly improved aeration of the lungs as compared to the previous study. Patient cleared for discharge by Nephrology. They will contact his outpatient HD center to clarify dry weight and length of sessions. He will be discharged back to [facility]

## 2022-11-19 NOTE — ASSESSMENT & PLAN NOTE
Outpatient HD Information:    -Outpatient HD unit: [facility]  -HD tx days: MWF  -HD tx time: 240mins  -Last HD tx prior to presentation: 11/16/22  -HD access: LUE AVF  -HD modality: iHD   -Residual urine: Anuric   -EDW: 79kg     Plan/Recommendations:    -HD again today for metabolic clearance and volume management   -Renal diet  -Strict I/O's and daily weights  -Daily renal function panels   -Renally dose meds

## 2022-11-19 NOTE — NURSING
TX started     11/19/22 0100   Vital Signs   Temp 97.8 °F (36.6 °C)   Temp src Oral   Pulse 79   Heart Rate Source Monitor   Resp 18   SpO2 (!) 93 %   Pulse Oximetry Type Continuous   Oximetry Probe Site Intact   Flow (L/min) 7   O2 Device (Oxygen Therapy) nasal cannula   BP (!) 144/81   MAP (mmHg) 109   BP Location Right arm   BP Method Automatic   Patient Position Lying   Assessments (Pre/Post)   Level of Consciousness (AVPU) alert   Pre-Hemodialysis Assessment   Patient Status Other (Comment)  (HD nurse at bedside)   Gross Bleach Negative Yes   Machine Number [id number]   Alarms Verified Yes   pH 7   Machine Temperature 97.7 °F (36.5 °C)   Dialyzer F-160   Machine Conductivity 14.3   Meter Conductivity 14.4   Dialysate Na (mEq/L) 138   Dialysate K (mEq/L) 3   Dialysate CA (mEq/L) 2.5   Dialysate HCO3 (mEq/L) 25   Prime Ordered (mL) 250 mL   Net UF Goal 3000   Total UF Goal 3500   Pre-Hemodialysis Comments Stable for treatment   UF Rate 1170   RO # / DI #  23   RO Rejection Within Limits? Yes        Hemodialysis AV Fistula Left upper arm   No placement date or time found.   Present Prior to Hospital Arrival?: Yes  Location: Left upper arm   Needle Size 15ga   Site Assessment Clean;Dry;Intact   Patency Present;Thrill;Bruit   Status Accessed   Flows Good   Dressing Open to air (None)   During Hemodialysis Assessment   Blood Flow Rate (mL/min) 400 mL/min   Dialysate Flow Rate (mL/min) 800 ml/min   Ultrafiltration Rate (mL/Hr) 1170 mL/Hr   Arteriovenous Lines Secure Yes   Arterial Pressure (mmHg) -190 mmHg   Venous Pressure (mmHg) 150   Blood Volume Processed (Liters) 0 L   UF Removed (mL) 0 mL   TMP 50   Venous Line in Air Detector Yes   Intake (mL) 250 mL   Intra-Hemodialysis Comments Cannulated two 15g needles without difficulty.  Tx started.

## 2022-11-20 NOTE — ASSESSMENT & PLAN NOTE
- agitated and confused overnight    - improved  - given oral depakote x1 with improvement   - continue to monitor

## 2022-11-20 NOTE — ASSESSMENT & PLAN NOTE
Patient's FSGs are uncontrolled due to hyperglycemia on current medication regimen.  Last A1c reviewed-   Lab Results   Component Value Date    HGBA1C 11.9 (H) 10/12/2022     Most recent fingerstick glucose reviewed-   Recent Labs   Lab 11/19/22  2112 11/20/22  0724 11/20/22  1129 11/20/22  1632   POCTGLUCOSE 121* 288* 373* 246*     Current correctional scale  Low  Maintain anti-hyperglycemic dose as follows-   Antihyperglycemics (From admission, onward)    Start     Stop Route Frequency Ordered    11/20/22 1820  insulin aspart U-100 pen 0-5 Units         -- SubQ Before meals & nightly PRN 11/20/22 1721        Hold Oral hypoglycemics while patient is in the hospital.

## 2022-11-20 NOTE — PROGRESS NOTES
[facility] - Observation [address] [place] [address] Medicine  Progress Note    Patient Name: Cosme Lewis  MRN: [id number]  Patient Class: IP- Inpatient   Admission Date: 11/18/2022  Length of Stay: 0 days  Attending Physician: Britt Mason MD  Primary Care Provider: Primary Doctor No        Subjective:     Principal Problem:Acute HFrEF (heart failure with reduced ejection fraction)        HPI:  Cosme Lewis is a 57 y.o. 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 Per ED MD, the facility believed he appeared more overloaded than normal and therefore sent him to the ED. The patient reports he was at his mother's home before being picked up by transportation. He states he passed out in while in transport and never arrived at the facility, he does not seem like the most reliable historian. He reports using supplemental oxygen at baseline, unsure of how many liters. He denies chest pain, worsening shortness of breath, diarrhea, abdominal pain, cough or sore throat. He has no awareness of his medications. He states he lives in a facility for people who are 55 and older. He denies tobacco and alcohol use.     ED: afebrile without leukocytosis.nephrology was consulted for HD. VSS. BNP 2,255. Troponin 0.035. admitted to hospital medicine for further management.        Overview/Hospital Course:  Cosme Lewis was admitted to hospital medicine for management of volume overload in the setting of missed HD. Nephro consulted, taken for HD with 3L fluid removal. Patient with confusion and agitation overnight, pulled out IV and pulled off telemetry. Given oral Depakote for non-redirectable agitation in setting of prolonged QT. Additional session of HD scheduled. Continued to appear fluid overloaded on exam.       Interval History:  NAEON. AFVSS.  Evaluated at bedside, appears less confused  through not fully oriented. Knows self, place and month - stated year as 2016 then 2020. Reporting improved SOB at rest but significant orthopnea. Still with 2+ pitting edema to lower extremities. No new complaints, all questions answered.    Attempted to call patient's contact for collateral given patient's confusion-( Mother) Kassandra at [phone], did not receive an answer. Will re-attempt later     Review of Systems   Constitutional:  Negative for activity change, chills and fever.   HENT:  Negative for congestion, drooling and trouble swallowing.    Eyes:  Negative for photophobia and visual disturbance.   Respiratory:  Positive for shortness of breath. Negative for cough, chest tightness and wheezing.         Orthopnea    Cardiovascular:  Positive for leg swelling. Negative for chest pain and palpitations.   Gastrointestinal:  Negative for abdominal distention, abdominal pain, constipation, diarrhea, nausea and vomiting.   Endocrine: Negative for cold intolerance and heat intolerance.   Genitourinary:  Negative for difficulty urinating, dysuria, frequency, hematuria and urgency.   Musculoskeletal:  Negative for arthralgias, back pain and gait problem.   Skin:  Negative for color change, pallor and rash.   Allergic/Immunologic: Negative for immunocompromised state.   Neurological:  Negative for dizziness, syncope, weakness, light-headedness, numbness and headaches.   Psychiatric/Behavioral:  Positive for confusion. Negative for agitation and behavioral problems. The patient is not nervous/anxious.    Objective:     Vital Signs (Most Recent):  Temp: 96.3 °F (35.7 °C) (11/20/22 1208)  Pulse: 74 (11/20/22 1640)  Resp: 16 (11/20/22 1640)  BP: (!) 168/81 (11/20/22 1640)  SpO2: (!) 92 % (11/20/22 1640)   Vital Signs (24h Range):  Temp:  [95.5 °F (35.3 °C)-98.1 °F (36.7 °C)] 96.3 °F (35.7 °C)  Pulse:  [71-78] 74  Resp:  [15-18] 16  SpO2:  [92 %-98 %] 92 %  BP: (123-170)/(60-84) 168/81     Weight: 85 kg (187 lb 6.3  oz)  Body mass index is 27.67 kg/m².  No intake or output data in the 24 hours ending 11/20/22 1727   Physical Exam    Significant Labs: All pertinent labs within the past 24 hours have been reviewed.  CBC:   Recent Labs   Lab 11/19/22  1357   WBC 5.17   HGB 8.8*   HCT 29.2*        CMP:   Recent Labs   Lab 11/19/22  1357      K 4.2      CO2 24   *   BUN 23*   CREATININE 4.3*   CALCIUM 8.0*   PROT 6.2   ALBUMIN 2.6*   BILITOT 0.6   ALKPHOS 224*   AST 37   ALT 26   ANIONGAP 12     Magnesium:   Recent Labs   Lab 11/19/22  1357   MG 2.3       Significant Imaging: I have reviewed all pertinent imaging results/findings within the past 24 hours.      Assessment/Plan:      * Acute HFrEF (heart failure with reduced ejection fraction)  - CHF is currently uncontrolled due to Continued edema of extremities and hypervolemia in the setting of renal failure  - Latest echo 10/12/2022: EF 40%, (+)DD, CVP 15 mmHg, PAP 65mmHg  - Continue Beta Blocker    - home diuretic: n/a may benefit from lasix    - hospital diuresis: lasix 100 MG tomorrow AM   - carvediloL (COREG) 3.125 MG tablet BID   - Place on fluid restriction of 1.5 L. Continue to stress to patient importance of self efficacy and  on diet for CHF.   - Monitor clinical status closely. Monitor on telemetry.   - Monitor strict Is&Os and daily weights.    - Last BNP reviewed- and noted below   Recent Labs   Lab 11/18/22  1334   BNP 2,255*       Agitation  - agitated and confused overnight    - improved  - given oral depakote x1 with improvement   - continue to monitor     HLD (hyperlipidemia)  - atorvastatin (LIPITOR) 40 MG tablet daily     Renovascular hypertension  - isosorbide mononitrate (IMDUR) 30 MG 24 hr tablet BID   - NIFEdipine (ADALAT CC) 120 MG TbSR daily   - hydrALAZINE (APRESOLINE) 100 MG tablet TID PRN   - carvediloL (COREG) 3.125 MG tablet BID   - cloNIDine 0.3 mg/24 hr td ptwk (CATAPRES) 0.3 mg/24 hr qweekly    Anemia in ESRD  (end-stage renal disease)  - Current CBC reviewed-   Lab Results   Component Value Date    HGB 8.8 (L) 11/19/2022    HCT 29.2 (L) 11/19/2022     - Patient's anemia is currently controlled.   - Etiology likely d/t anemia of chronic disease, iron deficiency   - Monitor serial CBC and transfuse if patient becomes hemodynamically unstable, symptomatic or H/H drops below 7/21.     Chronic hypoxemic respiratory failure  Patient with Hypoxic Respiratory failure which is Chronic.  he is on home oxygen at 2-3 LPM. Supplemental oxygen was provided and noted. Labs and images were reviewed. Patient Has not had a recent ABG. Will treat underlying causes and adjust management of respiratory failure as follows-     - HD for volume removal; removed 6L so far  - DuoNebs PRN  - fluticasone-salmeterol diskus inhaler 250-50 mcg BID  - tiotropium bromide (SPIRIVA RESPIMAT) 2.5 mcg/actuation inhaler daily     Type 1 diabetes mellitus with chronic kidney disease on chronic dialysis  Patient's FSGs are uncontrolled due to hyperglycemia on current medication regimen.  Last A1c reviewed-   Lab Results   Component Value Date    HGBA1C 11.9 (H) 10/12/2022     Most recent fingerstick glucose reviewed-   Recent Labs   Lab 11/19/22  2112 11/20/22  0724 11/20/22  1129 11/20/22  1632   POCTGLUCOSE 121* 288* 373* 246*     Current correctional scale  Low  Maintain anti-hyperglycemic dose as follows-   Antihyperglycemics (From admission, onward)    Start     Stop Route Frequency Ordered    11/20/22 1820  insulin aspart U-100 pen 0-5 Units         -- SubQ Before meals & nightly PRN 11/20/22 1721        Hold Oral hypoglycemics while patient is in the hospital.    ESRD on dialysis  Chronic kidney disease-mineral and bone disorder  MWF schedule  Residual renal function?- Yes    - Nephrology consulted  - Continue chronic hemodialysis  - calcium acetate,phosphat bind, (PHOSLO) 667 mg capsule TID   - Monitor daily electrolytes and defer dialysis orders to  nephrology      VTE Risk Mitigation (From admission, onward)         Ordered     heparin (porcine) injection 5,000 Units  Every 8 hours         11/20/22 1721     IP VTE HIGH RISK PATIENT  Once         11/20/22 1721     Place sequential compression device  Until discontinued         11/20/22 1721                Discharge Planning   GERA: 11/20/2022     Code Status: Full Code   Is the patient medically ready for discharge?: No    Reason for patient still in hospital (select all that apply): Patient trending condition, Laboratory test and Treatment             Alissa Song PA-C  Department of Hospital Medicine   [facility] - Observation 11H

## 2022-11-20 NOTE — ASSESSMENT & PLAN NOTE
- Current CBC reviewed-   Lab Results   Component Value Date    HGB 8.8 (L) 11/19/2022    HCT 29.2 (L) 11/19/2022     - Patient's anemia is currently controlled.   - Etiology likely d/t anemia of chronic disease, iron deficiency   - Monitor serial CBC and transfuse if patient becomes hemodynamically unstable, symptomatic or H/H drops below 7/21.

## 2022-11-20 NOTE — SUBJECTIVE & OBJECTIVE
Interval History:  NAEON. AFVSS.  Evaluated at bedside, appears less confused through not fully oriented. Knows self, place and month - stated year as 2016 then 2020. Reporting improved SOB at rest but significant orthopnea. Still with 2+ pitting edema to lower extremities. No new complaints, all questions answered.    Attempted to call patient's contact for collateral given patient's confusion-( Mother) Kassandra at [phone], did not receive an answer. Will re-attempt later     Review of Systems   Constitutional:  Negative for activity change, chills and fever.   HENT:  Negative for congestion, drooling and trouble swallowing.    Eyes:  Negative for photophobia and visual disturbance.   Respiratory:  Positive for shortness of breath. Negative for cough, chest tightness and wheezing.         Orthopnea    Cardiovascular:  Positive for leg swelling. Negative for chest pain and palpitations.   Gastrointestinal:  Negative for abdominal distention, abdominal pain, constipation, diarrhea, nausea and vomiting.   Endocrine: Negative for cold intolerance and heat intolerance.   Genitourinary:  Negative for difficulty urinating, dysuria, frequency, hematuria and urgency.   Musculoskeletal:  Negative for arthralgias, back pain and gait problem.   Skin:  Negative for color change, pallor and rash.   Allergic/Immunologic: Negative for immunocompromised state.   Neurological:  Negative for dizziness, syncope, weakness, light-headedness, numbness and headaches.   Psychiatric/Behavioral:  Positive for confusion. Negative for agitation and behavioral problems. The patient is not nervous/anxious.    Objective:     Vital Signs (Most Recent):  Temp: 96.3 °F (35.7 °C) (11/20/22 1208)  Pulse: 74 (11/20/22 1640)  Resp: 16 (11/20/22 1640)  BP: (!) 168/81 (11/20/22 1640)  SpO2: (!) 92 % (11/20/22 1640)   Vital Signs (24h Range):  Temp:  [95.5 °F (35.3 °C)-98.1 °F (36.7 °C)] 96.3 °F (35.7 °C)  Pulse:  [71-78] 74  Resp:  [15-18] 16  SpO2:   [92 %-98 %] 92 %  BP: (123-170)/(60-84) 168/81     Weight: 85 kg (187 lb 6.3 oz)  Body mass index is 27.67 kg/m².  No intake or output data in the 24 hours ending 11/20/22 1727   Physical Exam    Significant Labs: All pertinent labs within the past 24 hours have been reviewed.  CBC:   Recent Labs   Lab 11/19/22  1357   WBC 5.17   HGB 8.8*   HCT 29.2*        CMP:   Recent Labs   Lab 11/19/22  1357      K 4.2      CO2 24   *   BUN 23*   CREATININE 4.3*   CALCIUM 8.0*   PROT 6.2   ALBUMIN 2.6*   BILITOT 0.6   ALKPHOS 224*   AST 37   ALT 26   ANIONGAP 12     Magnesium:   Recent Labs   Lab 11/19/22  1357   MG 2.3       Significant Imaging: I have reviewed all pertinent imaging results/findings within the past 24 hours.

## 2022-11-20 NOTE — ASSESSMENT & PLAN NOTE
Patient's FSGs are uncontrolled due to hyperglycemia on current medication regimen.  Last A1c reviewed-   Lab Results   Component Value Date    HGBA1C 11.9 (H) 10/12/2022     Most recent fingerstick glucose reviewed-   Recent Labs   Lab 11/18/22  2119 11/19/22  0740 11/19/22  1239 11/19/22  1754   POCTGLUCOSE 137* 255* 362* 201*     Current correctional scale  Low  Maintain anti-hyperglycemic dose as follows-   Antihyperglycemics (From admission, onward)    Start     Stop Route Frequency Ordered    11/19/22 0900  insulin detemir U-100 pen 12 Units         -- SubQ Daily 11/18/22 1608    11/18/22 1715  insulin aspart U-100 pen 5 Units         -- SubQ 3 times daily with meals 11/18/22 1608        Hold Oral hypoglycemics while patient is in the hospital.

## 2022-11-20 NOTE — ASSESSMENT & PLAN NOTE
- agitated and confused overnight   - given oral depakote with improvement   - continue to monitor

## 2022-11-20 NOTE — PROGRESS NOTES
[facility] [address] [place] [address] Medicine  Progress Note    Patient Name: Cosme Lewis  MRN: [id number]  Patient Class: OP- Observation   Admission Date: 11/18/2022  Length of Stay: 0 days  Attending Physician: Britt Mason MD  Primary Care Provider: Primary Doctor No        Subjective:     Principal Problem:Acute HFrEF (heart failure with reduced ejection fraction)        HPI:  Cosme Lewis is a 57 y.o. 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 Per ED MD, the facility believed he appeared more overloaded than normal and therefore sent him to the ED. The patient reports he was at his mother's home before being picked up by transportation. He states he passed out in while in transport and never arrived at the facility, he does not seem like the most reliable historian. He reports using supplemental oxygen at baseline, unsure of how many liters. He denies chest pain, worsening shortness of breath, diarrhea, abdominal pain, cough or sore throat. He has no awareness of his medications. He states he lives in a facility for people who are 55 and older. He denies tobacco and alcohol use.     ED: afebrile without leukocytosis.nephrology was consulted for HD. VSS. BNP 2,255. Troponin 0.035. admitted to hospital medicine for further management.        Overview/Hospital Course:  Cosme Lewis was admitted to hospital medicine for management of volume overload in the setting of missed HD. Nephro consulted, taken for HD with 3L fluid removal. Patient with confusion and agitation overnight, pulled out IV and pulled off telemetry. Given oral Depakote for non-redirectable agitation in setting of prolonged QT. Additional session of HD scheduled.       Interval History:  Patient intermittently confused and agitated overnight. Removed IV, NC and telemetry. VSS,  AF.  Evaluated at bedside, appeared confused but cooperative. Required assistance finding NC and placing back in his nose. IV access removed by patient, hemostasis to previous access site. Lungs clear to auscultation but with remaining pitting lower leg edema. Only complains of intermittent shortness of breath, denies chest pain.   Later had non-redirectable agitation, treated with PO Depakote with improvement.     Review of Systems   Constitutional:  Negative for chills and fever.   HENT:  Negative for congestion and drooling.    Eyes:  Negative for photophobia and visual disturbance.   Respiratory:  Positive for shortness of breath. Negative for cough.    Cardiovascular:  Positive for leg swelling. Negative for chest pain and palpitations.   Gastrointestinal:  Negative for abdominal distention and abdominal pain.   Endocrine: Negative for cold intolerance and heat intolerance.   Genitourinary:  Negative for difficulty urinating and dysuria.   Musculoskeletal:  Negative for back pain and gait problem.   Skin:  Negative for color change and pallor.   Allergic/Immunologic: Negative for immunocompromised state.   Neurological:  Negative for dizziness and headaches.   Psychiatric/Behavioral:  Positive for agitation and confusion. Negative for behavioral problems.    Objective:     Vital Signs (Most Recent):  Temp: 98.1 °F (36.7 °C) (11/19/22 1645)  Pulse: 70 (11/19/22 1645)  Resp: 18 (11/19/22 1645)  BP: 133/73 (11/19/22 1645)  SpO2: 95 % (11/19/22 1330) Vital Signs (24h Range):  Temp:  [97.7 °F (36.5 °C)-98.5 °F (36.9 °C)] 98.1 °F (36.7 °C)  Pulse:  [68-92] 70  Resp:  [18-19] 18  SpO2:  [93 %-100 %] 95 %  BP: (126-160)/(60-85) 133/73     Weight: 85 kg (187 lb 6.3 oz)  Body mass index is 27.67 kg/m².    Intake/Output Summary (Last 24 hours) at 11/19/2022 1818  Last data filed at 11/19/2022 1645  Gross per 24 hour   Intake 1100 ml   Output 7100 ml   Net -6000 ml      Physical Exam  Vitals and nursing note reviewed.    HENT:      Head: Normocephalic.      Nose:      Comments: NC in place      Mouth/Throat:      Mouth: Mucous membranes are moist.   Eyes:      General: No scleral icterus.     Pupils: Pupils are equal, round, and reactive to light.   Cardiovascular:      Rate and Rhythm: Normal rate and regular rhythm.      Pulses: Normal pulses.   Pulmonary:      Effort: Pulmonary effort is normal. No respiratory distress.      Breath sounds: Normal breath sounds. No wheezing or rales.   Abdominal:      General: Abdomen is flat. There is no distension.      Palpations: There is no mass.      Tenderness: There is no abdominal tenderness.   Musculoskeletal:         General: Normal range of motion.      Cervical back: Normal range of motion.      Right lower leg: Edema present.      Left lower leg: Edema present.   Skin:     General: Skin is warm and dry.      Capillary Refill: Capillary refill takes less than 2 seconds.      Coloration: Skin is not jaundiced.   Neurological:      General: No focal deficit present.      Mental Status: He is alert and oriented to person, place, and time.      Cranial Nerves: No cranial nerve deficit.   Psychiatric:         Mood and Affect: Mood normal.         Behavior: Behavior normal.       Significant Labs: All pertinent labs within the past 24 hours have been reviewed.  BMP:   Recent Labs   Lab 11/19/22  1357   *      K 4.2      CO2 24   BUN 23*   CREATININE 4.3*   CALCIUM 8.0*   MG 2.3     CBC:   Recent Labs   Lab 11/18/22  1334 11/18/22  1341 11/19/22  1357   WBC 5.40  --  5.17   HGB 9.7*  --  8.8*   HCT 31.1* 33* 29.2*     --  199     Lactic Acid: No results for input(s): LACTATE in the last 48 hours.  Magnesium:   Recent Labs   Lab 11/19/22  1357   MG 2.3     Troponin:   Recent Labs   Lab 11/18/22  1334   TROPONINI 0.035*     TSH:   Recent Labs   Lab 10/12/22  0435   TSH 0.952       Significant Imaging: I have reviewed all pertinent imaging results/findings within the  past 24 hours.      Assessment/Plan:      * Acute HFrEF (heart failure with reduced ejection fraction)  - CHF is currently uncontrolled due to Continued edema of extremities and hypervolemia in the setting of renal failure  - Latest echo 10/12/2022: EF 40%, (+)DD, CVP 15 mmHg, PAP 65mmHg  - Continue Beta Blocker    - home diuretic: n/a may benefit from lasix    - hospital diuresis: lasix 100 MG tomorrow AM   - carvediloL (COREG) 3.125 MG tablet BID   - Place on fluid restriction of 1.5 L. Continue to stress to patient importance of self efficacy and  on diet for CHF.   - Monitor clinical status closely. Monitor on telemetry.   - Monitor strict Is&Os and daily weights.    - Last BNP reviewed- and noted below   Recent Labs   Lab 11/18/22  1334   BNP 2,255*       Agitation  - agitated and confused overnight   - given oral depakote with improvement   - continue to monitor     HLD (hyperlipidemia)  - atorvastatin (LIPITOR) 40 MG tablet daily     Renovascular hypertension  - isosorbide mononitrate (IMDUR) 30 MG 24 hr tablet BID   - NIFEdipine (ADALAT CC) 120 MG TbSR daily   - hydrALAZINE (APRESOLINE) 100 MG tablet TID PRN   - carvediloL (COREG) 3.125 MG tablet BID   - cloNIDine 0.3 mg/24 hr td ptwk (CATAPRES) 0.3 mg/24 hr qweekly    Anemia in ESRD (end-stage renal disease)  - Current CBC reviewed-   Lab Results   Component Value Date    HGB 8.8 (L) 11/19/2022    HCT 29.2 (L) 11/19/2022     - Patient's anemia is currently controlled.   - Etiology likely d/t anemia of chronic disease, iron deficiency   - Monitor serial CBC and transfuse if patient becomes hemodynamically unstable, symptomatic or H/H drops below 7/21.     Chronic hypoxemic respiratory failure  Patient with Hypoxic Respiratory failure which is Chronic.  he is on home oxygen at 2-3 LPM. Supplemental oxygen was provided and noted. Labs and images were reviewed. Patient Has not had a recent ABG. Will treat underlying causes and adjust management of  respiratory failure as follows-     - HD for volume removal; removed 6L so far  - lasix in the AM  - DuoNebs PRN  - fluticasone-salmeterol diskus inhaler 250-50 mcg BID  - tiotropium bromide (SPIRIVA RESPIMAT) 2.5 mcg/actuation inhaler daily     Type 1 diabetes mellitus with chronic kidney disease on chronic dialysis  Patient's FSGs are uncontrolled due to hyperglycemia on current medication regimen.  Last A1c reviewed-   Lab Results   Component Value Date    HGBA1C 11.9 (H) 10/12/2022     Most recent fingerstick glucose reviewed-   Recent Labs   Lab 11/18/22  2119 11/19/22  0740 11/19/22  1239 11/19/22  1754   POCTGLUCOSE 137* 255* 362* 201*     Current correctional scale  Low  Maintain anti-hyperglycemic dose as follows-   Antihyperglycemics (From admission, onward)    Start     Stop Route Frequency Ordered    11/19/22 0900  insulin detemir U-100 pen 12 Units         -- SubQ Daily 11/18/22 1608    11/18/22 1715  insulin aspart U-100 pen 5 Units         -- SubQ 3 times daily with meals 11/18/22 1608        Hold Oral hypoglycemics while patient is in the hospital.    ESRD on dialysis  Chronic kidney disease-mineral and bone disorder  MWF schedule  Residual renal function?- Yes    - Nephrology consulted  - Continue chronic hemodialysis  - calcium acetate,phosphat bind, (PHOSLO) 667 mg capsule TID   - Monitor daily electrolytes and defer dialysis orders to nephrology      VTE Risk Mitigation (From admission, onward)    None          Discharge Planning   GERA: 11/20/2022     Code Status: Prior   Is the patient medically ready for discharge?: No    Reason for patient still in hospital (select all that apply): Patient trending condition, Laboratory test, Treatment and Consult recommendations             Alissa Song PA-C  Department of Hospital Medicine   [facility]

## 2022-11-20 NOTE — SUBJECTIVE & OBJECTIVE
Interval History:  Patient intermittently confused and agitated overnight. Removed IV, NC and telemetry. VSS, AF.  Evaluated at bedside, appeared confused but cooperative. Required assistance finding NC and placing back in his nose. IV access removed by patient, hemostasis to previous access site. Lungs clear to auscultation but with remaining pitting lower leg edema. Only complains of intermittent shortness of breath, denies chest pain.   Later had non-redirectable agitation, treated with PO Depakote with improvement.     Review of Systems   Constitutional:  Negative for chills and fever.   HENT:  Negative for congestion and drooling.    Eyes:  Negative for photophobia and visual disturbance.   Respiratory:  Positive for shortness of breath. Negative for cough.    Cardiovascular:  Positive for leg swelling. Negative for chest pain and palpitations.   Gastrointestinal:  Negative for abdominal distention and abdominal pain.   Endocrine: Negative for cold intolerance and heat intolerance.   Genitourinary:  Negative for difficulty urinating and dysuria.   Musculoskeletal:  Negative for back pain and gait problem.   Skin:  Negative for color change and pallor.   Allergic/Immunologic: Negative for immunocompromised state.   Neurological:  Negative for dizziness and headaches.   Psychiatric/Behavioral:  Positive for agitation and confusion. Negative for behavioral problems.    Objective:     Vital Signs (Most Recent):  Temp: 98.1 °F (36.7 °C) (11/19/22 1645)  Pulse: 70 (11/19/22 1645)  Resp: 18 (11/19/22 1645)  BP: 133/73 (11/19/22 1645)  SpO2: 95 % (11/19/22 1330) Vital Signs (24h Range):  Temp:  [97.7 °F (36.5 °C)-98.5 °F (36.9 °C)] 98.1 °F (36.7 °C)  Pulse:  [68-92] 70  Resp:  [18-19] 18  SpO2:  [93 %-100 %] 95 %  BP: (126-160)/(60-85) 133/73     Weight: 85 kg (187 lb 6.3 oz)  Body mass index is 27.67 kg/m².    Intake/Output Summary (Last 24 hours) at 11/19/2022 1818  Last data filed at 11/19/2022 1645  Gross per 24  hour   Intake 1100 ml   Output 7100 ml   Net -6000 ml      Physical Exam  Vitals and nursing note reviewed.   HENT:      Head: Normocephalic.      Nose:      Comments: NC in place      Mouth/Throat:      Mouth: Mucous membranes are moist.   Eyes:      General: No scleral icterus.     Pupils: Pupils are equal, round, and reactive to light.   Cardiovascular:      Rate and Rhythm: Normal rate and regular rhythm.      Pulses: Normal pulses.   Pulmonary:      Effort: Pulmonary effort is normal. No respiratory distress.      Breath sounds: Normal breath sounds. No wheezing or rales.   Abdominal:      General: Abdomen is flat. There is no distension.      Palpations: There is no mass.      Tenderness: There is no abdominal tenderness.   Musculoskeletal:         General: Normal range of motion.      Cervical back: Normal range of motion.      Right lower leg: Edema present.      Left lower leg: Edema present.   Skin:     General: Skin is warm and dry.      Capillary Refill: Capillary refill takes less than 2 seconds.      Coloration: Skin is not jaundiced.   Neurological:      General: No focal deficit present.      Mental Status: He is alert and oriented to person, place, and time.      Cranial Nerves: No cranial nerve deficit.   Psychiatric:         Mood and Affect: Mood normal.         Behavior: Behavior normal.       Significant Labs: All pertinent labs within the past 24 hours have been reviewed.  BMP:   Recent Labs   Lab 11/19/22  1357   *      K 4.2      CO2 24   BUN 23*   CREATININE 4.3*   CALCIUM 8.0*   MG 2.3     CBC:   Recent Labs   Lab 11/18/22  1334 11/18/22  1341 11/19/22  1357   WBC 5.40  --  5.17   HGB 9.7*  --  8.8*   HCT 31.1* 33* 29.2*     --  199     Lactic Acid: No results for input(s): LACTATE in the last 48 hours.  Magnesium:   Recent Labs   Lab 11/19/22  1357   MG 2.3     Troponin:   Recent Labs   Lab 11/18/22  1334   TROPONINI 0.035*     TSH:   Recent Labs   Lab  10/12/22  0435   TSH 0.952       Significant Imaging: I have reviewed all pertinent imaging results/findings within the past 24 hours.

## 2022-11-20 NOTE — ASSESSMENT & PLAN NOTE
Patient with Hypoxic Respiratory failure which is Chronic.  he is on home oxygen at 2-3 LPM. Supplemental oxygen was provided and noted. Labs and images were reviewed. Patient Has not had a recent ABG. Will treat underlying causes and adjust management of respiratory failure as follows-     - HD for volume removal; removed 6L so far  - Val PRN  - fluticasone-salmeterol diskus inhaler 250-50 mcg BID  - tiotropium bromide (SPIRIVA RESPIMAT) 2.5 mcg/actuation inhaler daily

## 2022-11-20 NOTE — PLAN OF CARE
Patient up most of night.  No distress.  Watching tv and asking for snacks.      Problem: Device-Related Complication Risk (Hemodialysis)  Goal: Safe, Effective Therapy Delivery  Outcome: Ongoing, Progressing     Problem: Hemodynamic Instability (Hemodialysis)  Goal: Effective Tissue Perfusion  Outcome: Ongoing, Progressing     Problem: Infection (Hemodialysis)  Goal: Absence of Infection Signs and Symptoms  Outcome: Ongoing, Progressing     Problem: Adult Inpatient Plan of Care  Goal: Plan of Care Review  Outcome: Ongoing, Progressing  Goal: Patient-Specific Goal (Individualized)  Outcome: Ongoing, Progressing  Goal: Absence of Hospital-Acquired Illness or Injury  Outcome: Ongoing, Progressing  Goal: Optimal Comfort and Wellbeing  Outcome: Ongoing, Progressing  Goal: Readiness for Transition of Care  Outcome: Ongoing, Progressing     Problem: Diabetes Comorbidity  Goal: Blood Glucose Level Within Targeted Range  Outcome: Ongoing, Progressing     Problem: Electrolyte Imbalance (Chronic Kidney Disease)  Goal: Electrolyte Balance  Outcome: Ongoing, Progressing     Problem: Fluid Volume Excess (Chronic Kidney Disease)  Goal: Fluid Balance  Outcome: Ongoing, Progressing     Problem: Skin Injury Risk Increased  Goal: Skin Health and Integrity  Outcome: Ongoing, Progressing

## 2022-11-20 NOTE — ASSESSMENT & PLAN NOTE
- CHF is currently uncontrolled due to Continued edema of extremities and hypervolemia in the setting of renal failure  - Latest echo 10/12/2022: EF 40%, (+)DD, CVP 15 mmHg, PAP 65mmHg  - Continue Beta Blocker    - home diuretic: n/a may benefit from lasix    - hospital diuresis: lasix 100 MG tomorrow AM   - carvediloL (COREG) 3.125 MG tablet BID   - Place on fluid restriction of 1.5 L. Continue to stress to patient importance of self efficacy and  on diet for CHF.   - Monitor clinical status closely. Monitor on telemetry.   - Monitor strict Is&Os and daily weights.    - Last BNP reviewed- and noted below   Recent Labs   Lab 11/18/22  1334   BNP 2,255*      Awake

## 2022-11-20 NOTE — ASSESSMENT & PLAN NOTE
Patient with Hypoxic Respiratory failure which is Chronic.  he is on home oxygen at 2-3 LPM. Supplemental oxygen was provided and noted. Labs and images were reviewed. Patient Has not had a recent ABG. Will treat underlying causes and adjust management of respiratory failure as follows-     - HD for volume removal; removed 6L so far  - lasix in the AM  - DuoNebs PRN  - fluticasone-salmeterol diskus inhaler 250-50 mcg BID  - tiotropium bromide (SPIRIVA RESPIMAT) 2.5 mcg/actuation inhaler daily

## 2022-11-21 PROBLEM — I16.0 HYPERTENSIVE URGENCY: Status: ACTIVE | Noted: 2022-01-01

## 2022-11-21 NOTE — PROGRESS NOTES
[facility] NOTE    Cosme Lewis is a 57 y.o. male currently on hemodialysis for removal of uremic toxins and volume.     Patient seen and evaluated on hemodialysis, tolerating treatment, see HD flowsheet for vitals and assessments.    No Hypotension, chest pain, shortness of breath, cramping, nausea or vomiting.      Labs have been reviewed and the dialysate bath has been adjusted.     Labs:     Recent Labs   Lab 11/18/22  1334 11/19/22  1357 11/21/22  0423   * 138 134*   K 4.2 4.2 4.6   CL 93* 102 102   CO2 26 24 24   BUN 29* 23* 29*   CREATININE 4.9* 4.3* 4.5*   CALCIUM 7.8* 8.0* 8.1*   PHOS 2.7 2.5* 2.4*     Recent Labs   Lab 11/18/22  1334 11/18/22  1341 11/19/22  1357 11/21/22  0423   WBC 5.40  --  5.17 6.25   HGB 9.7*  --  8.8* 9.1*   HCT 31.1* 33* 29.2* 30.0*     --  199 238     No results found for: FESATURATED, FERRITIN     Assessment/Plan      Ultrafiltration goal: Liters: 3L. Maintain MAP > 65 mmHg.    Duration:  3.5 hrs    - Seen on dialysis today, tolerating session with current UFR, no complications.  Multiple HD treatments due to volume overload on arrival. No distress on exam.   - No lab stick/BP intake on access site  - Continue to monitor intake and output, daily weights   - Please avoid gadolinium, fleets, phos-based laxatives, NSAIDs  - Will follow closely and continue dialysis treatments while in-patient    Anemia  - Hgb 9.1  - Will request iron studies in AM to assess further needs of supplementation   - Will hold Epo due to HTN    BMM  - Renal diet with protein intake goal 1.5 g/kg/d  - Novasource with meals   - F/U PO4, Mg, Calcium. And albumin levels.   - Phos 2.4. Please hold binders.     HTN  - BP Elevated  - Goal for BP <140mmHg SBP and <90 mmHg DBP.   - Continue home antihypertensive regimen; adjust as needed.       Rosi Grant, KUMAR, APRN, FNP-C  Nephrology Department  Pager:  [phone]

## 2022-11-21 NOTE — ASSESSMENT & PLAN NOTE
Patient's FSGs are uncontrolled due to hyperglycemia on current medication regimen.  Last A1c reviewed-   Lab Results   Component Value Date    HGBA1C 11.9 (H) 10/12/2022     Most recent fingerstick glucose reviewed-   Recent Labs   Lab 11/20/22 2008 11/21/22  0751 11/21/22  1311 11/21/22  1607   POCTGLUCOSE 306* 201* 264* 340*     Current correctional scale  Low  Maintain anti-hyperglycemic dose as follows-   Antihyperglycemics (From admission, onward)    Start     Stop Route Frequency Ordered    11/21/22 1130  insulin aspart U-100 pen 3 Units         -- SubQ 3 times daily with meals 11/21/22 0756    11/21/22 0900  insulin detemir U-100 pen 5 Units         -- SubQ 2 times daily 11/21/22 0756    11/20/22 1820  insulin aspart U-100 pen 0-5 Units         -- SubQ Before meals & nightly PRN 11/20/22 1721        Hold Oral hypoglycemics while patient is in the hospital.

## 2022-11-21 NOTE — PROGRESS NOTES
Patient arrived in a bed to dialysis unit.   Report received as documented in PER Handoff on Doc Flowsheet.  VS's per Doc Flowsheet.    Observation Hemodialysis initiated using the following:    Dialysis Access: UMA AVF    Needle size: 15 gauge X2  Insertion with no complications.    Will Maintain telemetry and blood pressure monitoring throughout treatment.  Refer to flowsheet and MAR for details.

## 2022-11-21 NOTE — ASSESSMENT & PLAN NOTE
- Current CBC reviewed-   Lab Results   Component Value Date    HGB 9.1 (L) 11/21/2022    HCT 30.0 (L) 11/21/2022     - Patient's anemia is currently controlled.   - Etiology likely d/t anemia of chronic disease, iron deficiency   - Monitor serial CBC and transfuse if patient becomes hemodynamically unstable, symptomatic or H/H drops below 7/21.

## 2022-11-21 NOTE — ASSESSMENT & PLAN NOTE
Patient has a current diagnosis of hypertensive urgency (without evidence of end organ damage) which is uncontrolled.  Latest blood pressure and vitals reviewed-   Temp:  [96.3 °F (35.7 °C)-98.4 °F (36.9 °C)]   Pulse:  []   Resp:  [16-20]   BP: (161-206)/(81-97)   SpO2:  [84 %-100 %] .   Patient currently off IV antihypertensives.   Home meds for hypertension were reviewed and noted below.   Hypertension Medications             carvediloL (COREG) 3.125 MG tablet Take 1 tablet (3.125 mg total) by mouth 2 (two) times daily.    cloNIDine 0.3 mg/24 hr td ptwk (CATAPRES) 0.3 mg/24 hr Place 1 patch onto the skin every Saturday.    hydrALAZINE (APRESOLINE) 100 MG tablet Take 100 mg by mouth 3 (three) times daily as needed (blood pressure  greater than 190/100).    isosorbide mononitrate (IMDUR) 30 MG 24 hr tablet Take 30 mg by mouth 2 (two) times daily.    NIFEdipine (ADALAT CC) 60 MG TbSR Take 120 mg by mouth every evening.          Medication adjustment for hospital antihypertensives is as follows- increasingly hypertensive over the weekend, review home medications. Scheduled for clonidine patch change on Saturday - replaced patch today. Will continue to monitor vital signs.     Will aim for controlled BP reduction by medications noted above. Monitor and mitigate end organ damage as indicated.

## 2022-11-21 NOTE — PLAN OF CARE
Problem: Device-Related Complication Risk (Hemodialysis)  Goal: Safe, Effective Therapy Delivery  Outcome: Ongoing, Progressing     Problem: Hemodynamic Instability (Hemodialysis)  Goal: Effective Tissue Perfusion  Outcome: Ongoing, Progressing     Problem: Infection (Hemodialysis)  Goal: Absence of Infection Signs and Symptoms  Outcome: Ongoing, Progressing     Problem: Adult Inpatient Plan of Care  Goal: Plan of Care Review  Outcome: Ongoing, Progressing

## 2022-11-21 NOTE — PROGRESS NOTES
Hemodialysis treatment completed.    Treatment time received: 3.5 hours    Net fluid removed: 3 liters    Tolerated Treatment well. VSS. No acute distress.    Administered scheduled blood pressure medications per MAR.    Needles X2 removed and site de-accessed.  Pressure held till hemostasis obtained.  Placed gauze and tape dressing to site.  Fistual with continued bruit and thrill post treatment.    Accidental removal of patient's PIV. Primary nurse aware.      Report given as documented in PER Handoff on Doc Flowsheet  Refer to flowsheet and MAR for details.  Patient transported back in bed from Dialysis to primary unit.

## 2022-11-21 NOTE — PLAN OF CARE
Problem: Device-Related Complication Risk (Hemodialysis)  Goal: Safe, Effective Therapy Delivery  Outcome: Ongoing, Progressing     Problem: Electrolyte Imbalance (Chronic Kidney Disease)  Goal: Electrolyte Balance  Outcome: Ongoing, Progressing     Problem: Fluid Volume Excess (Chronic Kidney Disease)  Goal: Fluid Balance  Outcome: Ongoing, Progressing

## 2022-11-21 NOTE — SUBJECTIVE & OBJECTIVE
Interval History:  NAEON. AFVSS. Hypoxic to 92% on 4L NC.   Evaluated at bedside. Patient with notable facial swelling and work of breathing this morning. Course rales throughout lung fields. Patient endorses that facial swelling is a common occurrence when he needs to go to dialysis. No wheezing or tongue swelling noted, denies eating new foods. Given 25 mg oral benadryl. Mental status significantly improved from initial assessment. Plan for HD session today, will reassess volume status after. No chest pain or palpitations. No new complaints at this time.     Update: Reassessed after dialysis, mildly improved facial swelling. Improved respiratory rate, normal work of breathing and was resting comfortably in bed. Lower extremities swollen but closer to baseline.     Attempted to call patients contact (Mother, Kassandra) - did not answer after x2 calls.     Review of Systems   Constitutional:  Negative for activity change, chills and fever.   HENT:  Positive for facial swelling (periorbital, perioral). Negative for congestion, drooling and trouble swallowing.    Eyes:  Negative for photophobia and visual disturbance.   Respiratory:  Positive for shortness of breath. Negative for cough, chest tightness and wheezing.         Orthopnea    Cardiovascular:  Positive for leg swelling. Negative for chest pain and palpitations.   Gastrointestinal:  Negative for abdominal distention, abdominal pain, constipation, diarrhea, nausea and vomiting.   Endocrine: Negative for cold intolerance and heat intolerance.   Genitourinary:  Negative for difficulty urinating, dysuria, frequency, hematuria and urgency.   Musculoskeletal:  Negative for arthralgias, back pain and gait problem.   Skin:  Negative for color change, pallor and rash.   Allergic/Immunologic: Negative for immunocompromised state.   Neurological:  Negative for dizziness, syncope, weakness, light-headedness, numbness and headaches.   Psychiatric/Behavioral:  Positive for  confusion (improved). Negative for agitation and behavioral problems. The patient is not nervous/anxious.    Objective:     Vital Signs (Most Recent):  Temp: 98.4 °F (36.9 °C) (11/21/22 1324)  Pulse: 85 (11/21/22 1324)  Resp: 16 (11/21/22 1324)  BP: (!) 185/86 (11/21/22 1348)  SpO2: (!) 92 % (11/21/22 0716)   Vital Signs (24h Range):  Temp:  [96.3 °F (35.7 °C)-98.4 °F (36.9 °C)] 98.4 °F (36.9 °C)  Pulse:  [] 85  Resp:  [16-20] 16  SpO2:  [92 %-100 %] 92 %  BP: (161-206)/(81-97) 185/86     Weight: 85 kg (187 lb 6.3 oz)  Body mass index is 27.67 kg/m².    Intake/Output Summary (Last 24 hours) at 11/21/2022 1408  Last data filed at 11/21/2022 1353  Gross per 24 hour   Intake 1270.07 ml   Output 3650 ml   Net -2379.93 ml      Physical Exam  Vitals and nursing note reviewed.   Constitutional:       General: He is not in acute distress.     Appearance: He is well-developed.   HENT:      Head: Normocephalic and atraumatic.      Comments: Periorbital and perioral facial swelling, no stridor - protecting airway     Mouth/Throat:      Pharynx: No oropharyngeal exudate.   Eyes:      Conjunctiva/sclera: Conjunctivae normal.      Pupils: Pupils are equal, round, and reactive to light.   Cardiovascular:      Rate and Rhythm: Normal rate and regular rhythm.      Heart sounds: Normal heart sounds.      Arteriovenous access: Left arteriovenous access is present.  Pulmonary:      Effort: Pulmonary effort is normal. No respiratory distress.      Breath sounds: No stridor. Rales (diffuse rales througout all lung fields) present. No wheezing or rhonchi.      Comments: Labored breathing  Abdominal:      General: Bowel sounds are normal. There is no distension.      Palpations: Abdomen is soft.      Tenderness: There is no abdominal tenderness.   Musculoskeletal:         General: No tenderness. Normal range of motion.      Cervical back: Normal range of motion and neck supple.      Right lower leg: 3+ Pitting Edema present.       Left lower leg: 3+ Pitting Edema present.   Lymphadenopathy:      Cervical: No cervical adenopathy.   Skin:     General: Skin is warm and dry.      Capillary Refill: Capillary refill takes less than 2 seconds.      Findings: No rash.   Neurological:      Mental Status: He is alert and oriented to person, place, and time.      Cranial Nerves: No cranial nerve deficit.      Sensory: No sensory deficit.      Coordination: Coordination normal.   Psychiatric:         Behavior: Behavior normal.         Thought Content: Thought content normal.         Judgment: Judgment normal.       Significant Labs: All pertinent labs within the past 24 hours have been reviewed.  BMP:   Recent Labs   Lab 11/21/22  0423   *   *   K 4.6      CO2 24   BUN 29*   CREATININE 4.5*   CALCIUM 8.1*   MG 2.4     CBC:   Recent Labs   Lab 11/21/22 0423   WBC 6.25   HGB 9.1*   HCT 30.0*        Magnesium:   Recent Labs   Lab 11/21/22  0423   MG 2.4     Troponin: No results for input(s): TROPONINI, TROPONINIHS in the last 48 hours.  TSH:   Recent Labs   Lab 10/12/22  0435   TSH 0.952       Significant Imaging: I have reviewed all pertinent imaging results/findings within the past 24 hours.

## 2022-11-21 NOTE — PROGRESS NOTES
[facility] [address] [place] [address] Medicine  Progress Note    Patient Name: Cosme Lewis  MRN: [id number]  Patient Class: IP- Inpatient   Admission Date: 11/18/2022  Length of Stay: 1 days  Attending Physician: Britt Mason MD  Primary Care Provider: Primary Doctor No        Subjective:     Principal Problem:Acute HFrEF (heart failure with reduced ejection fraction)        HPI:  Cosme Lewis is a 57 y.o. 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 Per ED MD, the facility believed he appeared more overloaded than normal and therefore sent him to the ED. The patient reports he was at his mother's home before being picked up by transportation. He states he passed out in while in transport and never arrived at the facility, he does not seem like the most reliable historian. He reports using supplemental oxygen at baseline, unsure of how many liters. He denies chest pain, worsening shortness of breath, diarrhea, abdominal pain, cough or sore throat. He has no awareness of his medications. He states he lives in a facility for people who are 55 and older. He denies tobacco and alcohol use.     ED: afebrile without leukocytosis.nephrology was consulted for HD. VSS. BNP 2,255. Troponin 0.035. admitted to hospital medicine for further management.        Overview/Hospital Course:  Cosme Lewis was admitted to hospital medicine for management of volume overload in the setting of missed HD. Nephro consulted, taken for HD with 3L fluid removal. Patient with confusion and agitation overnight, pulled out IV and pulled off telemetry. Given oral Depakote for non-redirectable agitation in setting of prolonged QT. Continued to appear fluid overloaded on exam, required several episodes of HD.       Interval History:  NAEON. AFVSS. Hypoxic to 92% on 4L NC.   Evaluated at bedside.  Patient with notable facial swelling and work of breathing this morning. Course rales throughout lung fields. Patient endorses that facial swelling is a common occurrence when he needs to go to dialysis. No wheezing or tongue swelling noted, denies eating new foods. Given 25 mg oral benadryl. Mental status significantly improved from initial assessment. Plan for HD session today, will reassess volume status after. No chest pain or palpitations. No new complaints at this time.     Update: Reassessed after dialysis, mildly improved facial swelling. Improved respiratory rate, normal work of breathing and was resting comfortably in bed. Lower extremities swollen but closer to baseline.     Attempted to call patients contact (Mother, Kassandra) - did not answer after x2 calls.     Review of Systems   Constitutional:  Negative for activity change, chills and fever.   HENT:  Positive for facial swelling (periorbital, perioral). Negative for congestion, drooling and trouble swallowing.    Eyes:  Negative for photophobia and visual disturbance.   Respiratory:  Positive for shortness of breath. Negative for cough, chest tightness and wheezing.         Orthopnea    Cardiovascular:  Positive for leg swelling. Negative for chest pain and palpitations.   Gastrointestinal:  Negative for abdominal distention, abdominal pain, constipation, diarrhea, nausea and vomiting.   Endocrine: Negative for cold intolerance and heat intolerance.   Genitourinary:  Negative for difficulty urinating, dysuria, frequency, hematuria and urgency.   Musculoskeletal:  Negative for arthralgias, back pain and gait problem.   Skin:  Negative for color change, pallor and rash.   Allergic/Immunologic: Negative for immunocompromised state.   Neurological:  Negative for dizziness, syncope, weakness, light-headedness, numbness and headaches.   Psychiatric/Behavioral:  Positive for confusion (improved). Negative for agitation and behavioral problems. The patient is  not nervous/anxious.    Objective:     Vital Signs (Most Recent):  Temp: 98.4 °F (36.9 °C) (11/21/22 1324)  Pulse: 85 (11/21/22 1324)  Resp: 16 (11/21/22 1324)  BP: (!) 185/86 (11/21/22 1348)  SpO2: (!) 92 % (11/21/22 0716)   Vital Signs (24h Range):  Temp:  [96.3 °F (35.7 °C)-98.4 °F (36.9 °C)] 98.4 °F (36.9 °C)  Pulse:  [] 85  Resp:  [16-20] 16  SpO2:  [92 %-100 %] 92 %  BP: (161-206)/(81-97) 185/86     Weight: 85 kg (187 lb 6.3 oz)  Body mass index is 27.67 kg/m².    Intake/Output Summary (Last 24 hours) at 11/21/2022 1408  Last data filed at 11/21/2022 1353  Gross per 24 hour   Intake 1270.07 ml   Output 3650 ml   Net -2379.93 ml      Physical Exam  Vitals and nursing note reviewed.   Constitutional:       General: He is not in acute distress.     Appearance: He is well-developed.   HENT:      Head: Normocephalic and atraumatic.      Comments: Periorbital and perioral facial swelling, no stridor - protecting airway     Mouth/Throat:      Pharynx: No oropharyngeal exudate.   Eyes:      Conjunctiva/sclera: Conjunctivae normal.      Pupils: Pupils are equal, round, and reactive to light.   Cardiovascular:      Rate and Rhythm: Normal rate and regular rhythm.      Heart sounds: Normal heart sounds.      Arteriovenous access: Left arteriovenous access is present.  Pulmonary:      Effort: Pulmonary effort is normal. No respiratory distress.      Breath sounds: No stridor. Rales (diffuse rales througout all lung fields) present. No wheezing or rhonchi.      Comments: Labored breathing  Abdominal:      General: Bowel sounds are normal. There is no distension.      Palpations: Abdomen is soft.      Tenderness: There is no abdominal tenderness.   Musculoskeletal:         General: No tenderness. Normal range of motion.      Cervical back: Normal range of motion and neck supple.      Right lower leg: 3+ Pitting Edema present.      Left lower leg: 3+ Pitting Edema present.   Lymphadenopathy:      Cervical: No cervical  adenopathy.   Skin:     General: Skin is warm and dry.      Capillary Refill: Capillary refill takes less than 2 seconds.      Findings: No rash.   Neurological:      Mental Status: He is alert and oriented to person, place, and time.      Cranial Nerves: No cranial nerve deficit.      Sensory: No sensory deficit.      Coordination: Coordination normal.   Psychiatric:         Behavior: Behavior normal.         Thought Content: Thought content normal.         Judgment: Judgment normal.       Significant Labs: All pertinent labs within the past 24 hours have been reviewed.  BMP:   Recent Labs   Lab 11/21/22 0423   *   *   K 4.6      CO2 24   BUN 29*   CREATININE 4.5*   CALCIUM 8.1*   MG 2.4     CBC:   Recent Labs   Lab 11/21/22 0423   WBC 6.25   HGB 9.1*   HCT 30.0*        Magnesium:   Recent Labs   Lab 11/21/22 0423   MG 2.4     Troponin: No results for input(s): TROPONINI, TROPONINIHS in the last 48 hours.  TSH:   Recent Labs   Lab 10/12/22  0435   TSH 0.952       Significant Imaging: I have reviewed all pertinent imaging results/findings within the past 24 hours.      Assessment/Plan:      * Acute HFrEF (heart failure with reduced ejection fraction)  - CHF is currently uncontrolled due to Continued edema of extremities and hypervolemia in the setting of renal failure  - Latest echo 10/12/2022: EF 40%, (+)DD, CVP 15 mmHg, PAP 65mmHg  - Continue Beta Blocker    - home diuretic: n/a may benefit from lasix    - hospital diuresis: lasix 100 MG tomorrow AM   - carvediloL (COREG) 3.125 MG tablet BID   - Place on fluid restriction of 1.5 L. Continue to stress to patient importance of self efficacy and  on diet for CHF.   - Monitor clinical status closely. Monitor on telemetry.   - Monitor strict Is&Os and daily weights.    - Last BNP reviewed- and noted below   Recent Labs   Lab 11/18/22  1334   BNP 2,255*       Hypertensive urgency  Patient has a current diagnosis of hypertensive  urgency (without evidence of end organ damage) which is uncontrolled.  Latest blood pressure and vitals reviewed-   Temp:  [96.3 °F (35.7 °C)-98.4 °F (36.9 °C)]   Pulse:  []   Resp:  [16-20]   BP: (161-206)/(81-97)   SpO2:  [84 %-100 %] .   Patient currently off IV antihypertensives.   Home meds for hypertension were reviewed and noted below.   Hypertension Medications             carvediloL (COREG) 3.125 MG tablet Take 1 tablet (3.125 mg total) by mouth 2 (two) times daily.    cloNIDine 0.3 mg/24 hr td ptwk (CATAPRES) 0.3 mg/24 hr Place 1 patch onto the skin every Saturday.    hydrALAZINE (APRESOLINE) 100 MG tablet Take 100 mg by mouth 3 (three) times daily as needed (blood pressure  greater than 190/100).    isosorbide mononitrate (IMDUR) 30 MG 24 hr tablet Take 30 mg by mouth 2 (two) times daily.    NIFEdipine (ADALAT CC) 60 MG TbSR Take 120 mg by mouth every evening.          Medication adjustment for hospital antihypertensives is as follows- increasingly hypertensive over the weekend, review home medications. Scheduled for clonidine patch change on Saturday - replaced patch today. Will continue to monitor vital signs.     Will aim for controlled BP reduction by medications noted above. Monitor and mitigate end organ damage as indicated.    Agitation  - agitated and confused overnight    - improved  - given oral depakote x1 with improvement   - continue to monitor     HLD (hyperlipidemia)  - atorvastatin (LIPITOR) 40 MG tablet daily     Renovascular hypertension  - isosorbide mononitrate (IMDUR) 30 MG 24 hr tablet BID   - NIFEdipine (ADALAT CC) 120 MG TbSR daily   - hydrALAZINE (APRESOLINE) 100 MG tablet TID PRN   - carvediloL (COREG) 3.125 MG tablet BID   - cloNIDine 0.3 mg/24 hr td ptwk (CATAPRES) 0.3 mg/24 hr qweekly    Anemia in ESRD (end-stage renal disease)  - Current CBC reviewed-   Lab Results   Component Value Date    HGB 9.1 (L) 11/21/2022    HCT 30.0 (L) 11/21/2022     - Patient's anemia is  currently controlled.   - Etiology likely d/t anemia of chronic disease, iron deficiency   - Monitor serial CBC and transfuse if patient becomes hemodynamically unstable, symptomatic or H/H drops below 7/21.     Chronic hypoxemic respiratory failure  Patient with Hypoxic Respiratory failure which is Chronic.  he is on home oxygen at 2-3 LPM. Supplemental oxygen was provided and noted. Labs and images were reviewed. Patient Has not had a recent ABG. Will treat underlying causes and adjust management of respiratory failure as follows-     - HD for volume removal; removed 6L so far  - DuoNebs PRN  - fluticasone-salmeterol diskus inhaler 250-50 mcg BID  - tiotropium bromide (SPIRIVA RESPIMAT) 2.5 mcg/actuation inhaler daily     Type 1 diabetes mellitus with chronic kidney disease on chronic dialysis  Patient's FSGs are uncontrolled due to hyperglycemia on current medication regimen.  Last A1c reviewed-   Lab Results   Component Value Date    HGBA1C 11.9 (H) 10/12/2022     Most recent fingerstick glucose reviewed-   Recent Labs   Lab 11/20/22 2008 11/21/22  0751 11/21/22  1311 11/21/22  1607   POCTGLUCOSE 306* 201* 264* 340*     Current correctional scale  Low  Maintain anti-hyperglycemic dose as follows-   Antihyperglycemics (From admission, onward)    Start     Stop Route Frequency Ordered    11/21/22 1130  insulin aspart U-100 pen 3 Units         -- SubQ 3 times daily with meals 11/21/22 0756    11/21/22 0900  insulin detemir U-100 pen 5 Units         -- SubQ 2 times daily 11/21/22 0756    11/20/22 1820  insulin aspart U-100 pen 0-5 Units         -- SubQ Before meals & nightly PRN 11/20/22 1721        Hold Oral hypoglycemics while patient is in the hospital.    ESRD on dialysis  Chronic kidney disease-mineral and bone disorder  MWF schedule  Residual renal function?- Yes    - Nephrology consulted  - Continue chronic hemodialysis  - calcium acetate,phosphat bind, (PHOSLO) 667 mg capsule TID   - Monitor daily  electrolytes and defer dialysis orders to nephrology    Cellulitis of left lower extremity  - chronic changes to bilateral lower extremities   - leg edema L>R  - US BLE ordered      VTE Risk Mitigation (From admission, onward)         Ordered     heparin (porcine) injection 5,000 Units  Every 8 hours         11/20/22 1721     IP VTE HIGH RISK PATIENT  Once         11/20/22 1721     Place sequential compression device  Until discontinued         11/20/22 1721                Discharge Planning   GERA: 11/23/2022     Code Status: Full Code   Is the patient medically ready for discharge?: No    Reason for patient still in hospital (select all that apply): Patient trending condition, Laboratory test, Treatment and Imaging  Discharge Plan A: Return to nursing home        Alissa Song PA-C  Department of Hospital Medicine   [facility]

## 2022-11-21 NOTE — NURSING
At 0000 nurse saw Pt. Fistula bandage on L arm tape with dried blood on it. 3 4x4 gauze dressings under tape saturated. Will continue to monitor bandage.

## 2022-11-21 NOTE — PLAN OF CARE
[facility]  Initial Discharge Assessment       Primary Care Provider: Primary Doctor No    Admission Diagnosis: SOB (shortness of breath) [R06.02]  Hypervolemia, unspecified hypervolemia type [E87.70]    Admission Date: 11/18/2022  Expected Discharge Date: 11/23/2022         Payor: MEDICARE / Plan: MEDICARE PART A & B / Product Type: Government /     Extended Emergency Contact Information  Primary Emergency Contact: Madi Leonwina  Mobile Phone: [phone]  Relation: Mother    Discharge Plan A: (P) Return to nursing home  Discharge Plan B: (P) Return to Nursing Home    No Pharmacies Listed    Initial Assessment (most recent)       Adult Discharge Assessment - 11/21/22 1512          Discharge Assessment    Assessment Type Discharge Planning Assessment     Confirmed/corrected address, phone number and insurance Yes     Confirmed Demographics Correct on Facesheet     Source of Information family     Lives With facility resident     Facility Arrived From: [facility]     Do you expect to return to your current living situation? Yes     Equipment Currently Used at Home wheelchair     Do you take prescription medications? Yes     Do you have prescription coverage? Yes     Do you have any problems affording any of your prescribed medications? TBD     Are you on dialysis? Yes     Dialysis Name and Scheduled days Mon, Wed, Fri     Do you take coumadin? No (P)      Discharge Plan A Return to nursing home (P)      Discharge Plan B Return to Nursing Home (P)                                   SW completed Discharge Planning Assessment with patient's mother, Kassandra via bedside. Discharge planning booklet given to patient/family and whiteboard updated with GERA and phone #. All questions answered.    Patient will need assistance with transportation upon discharge.     Kassandra reported that patient is a resident of [facility], and prior to [facility] he needed assistance with his ADL's.  Kassandra reported that patient uses a wheelchair. Kassandra reported that patient goes to dialysis on Mon, Wed, and Fri; however, she does not know the name of the dialysis center. Patient does not go to a Coumadin clinic.       Meme Payne LMSW  [facility]  Ext. [phone]

## 2022-11-22 NOTE — ASSESSMENT & PLAN NOTE
Renovascular Hypertension  Patient has a current diagnosis of hypertensive urgency (without evidence of end organ damage) which is uncontrolled.  Latest blood pressure and vitals reviewed-   Temp:  [97.5 °F (36.4 °C)-98.8 °F (37.1 °C)]   Pulse:  [70-85]   Resp:  [13-18]   BP: (154-188)/(78-93)   SpO2:  [85 %-96 %] .   - Patient currently off IV antihypertensives.   - Home meds for hypertension were reviewed.   - Medication adjustment for hospital antihypertensives is as follows:   - Increased Nifedipine to 90 mg qD   - Carvedilol 6.25 mg BID   - Clonidine patch every Monday   - Imdur 30 mg BID  - Will continue to monitor vital signs.   - Will aim for controlled BP reduction by medications noted above. Monitor and mitigate end organ damage as indicated.

## 2022-11-22 NOTE — SUBJECTIVE & OBJECTIVE
Interval History: CORDELIAJENNY, patient hypertensive to 188/93. Increased Nifedipine to 90 mg qD. Patient reports requiring 2 L NC at home, however is on 4 L at this time with decreased oxygen sats. He endorses continued shortness of breath/orthopnea, but denies chest pain/tightness or light-headedness at this time. BNP pending given continued fluid overload on exam. Patient received HD on 11/21 and is scheduled for another session on 11/23. Per nephrology, holding phosphate binder due to hypophosphatemia. Will continue to monitor labs. Increased insulin regimen given continued hyperglycemia.    Review of Systems   Constitutional:  Negative for activity change, chills and fever.   HENT:  Positive for facial swelling (periorbital, perioral - improved). Negative for congestion, drooling and trouble swallowing.    Eyes:  Negative for photophobia and visual disturbance.   Respiratory:  Positive for shortness of breath. Negative for cough, chest tightness and wheezing.         Orthopnea    Cardiovascular:  Positive for leg swelling. Negative for chest pain and palpitations.   Gastrointestinal:  Negative for abdominal distention, abdominal pain, constipation, diarrhea, nausea and vomiting.   Endocrine: Negative for cold intolerance and heat intolerance.   Genitourinary:  Negative for difficulty urinating, dysuria, frequency, hematuria and urgency.   Musculoskeletal:  Negative for arthralgias, back pain and gait problem.   Skin:  Negative for color change, pallor and rash.   Allergic/Immunologic: Negative for immunocompromised state.   Neurological:  Negative for dizziness, syncope, weakness, light-headedness, numbness and headaches.   Psychiatric/Behavioral:  Positive for confusion (improved). Negative for agitation and behavioral problems. The patient is not nervous/anxious.      Objective:     Vital Signs (Most Recent):  Temp: 98.2 °F (36.8 °C) (11/22/22 1620)  Pulse: 79 (11/22/22 1620)  Resp: 18 (11/22/22 1620)  BP: (!)  154/80 (11/22/22 1620)  SpO2: (!) 91 % (11/22/22 1620)   Vital Signs (24h Range):  Temp:  [97.5 °F (36.4 °C)-98.8 °F (37.1 °C)] 98.2 °F (36.8 °C)  Pulse:  [70-85] 79  Resp:  [13-18] 18  SpO2:  [85 %-96 %] 91 %  BP: (154-188)/(78-93) 154/80     Weight: 85 kg (187 lb 6.3 oz)  Body mass index is 27.67 kg/m².    Intake/Output Summary (Last 24 hours) at 11/22/2022 1652  Last data filed at 11/22/2022 0546  Gross per 24 hour   Intake 535 ml   Output --   Net 535 ml      Physical Exam  Vitals and nursing note reviewed.   Constitutional:       General: He is not in acute distress.     Appearance: He is well-developed.   HENT:      Head: Normocephalic and atraumatic.      Comments: Periorbital and perioral facial swelling improved     Mouth/Throat:      Pharynx: No oropharyngeal exudate.   Eyes:      Conjunctiva/sclera: Conjunctivae normal.      Pupils: Pupils are equal, round, and reactive to light.   Cardiovascular:      Rate and Rhythm: Normal rate and regular rhythm.      Heart sounds: Normal heart sounds.      Arteriovenous access: Left arteriovenous access is present.  Pulmonary:      Effort: Pulmonary effort is normal. No respiratory distress.      Breath sounds: No stridor. Rales (diffuse rales througout all lung fields) present. No wheezing or rhonchi.      Comments: Work of breathing slightly improved  Abdominal:      General: Bowel sounds are normal. There is no distension.      Palpations: Abdomen is soft.      Tenderness: There is no abdominal tenderness.   Musculoskeletal:         General: No tenderness. Normal range of motion.      Cervical back: Normal range of motion and neck supple.      Right lower leg: 3+ Pitting Edema present.      Left lower leg: 3+ Pitting Edema present.   Lymphadenopathy:      Cervical: No cervical adenopathy.   Skin:     General: Skin is warm and dry.      Capillary Refill: Capillary refill takes less than 2 seconds.      Findings: No rash.   Neurological:      Mental Status: He is  alert and oriented to person, place, and time.      Cranial Nerves: No cranial nerve deficit.      Sensory: No sensory deficit.      Coordination: Coordination normal.   Psychiatric:         Behavior: Behavior normal.         Thought Content: Thought content normal.         Judgment: Judgment normal.       Significant Labs: All pertinent labs within the past 24 hours have been reviewed.    Significant Imaging: I have reviewed all pertinent imaging results/findings within the past 24 hours.

## 2022-11-22 NOTE — ASSESSMENT & PLAN NOTE
Outpatient HD Information:    -Outpatient HD unit: [facility]  -HD tx days: MWF  -HD tx time: 240mins  -Last HD tx prior to presentation: 11/16/22  -HD access: LUE AVF  -HD modality: iHD   -Residual urine: Anuric   -EDW: 79kg     Plan/Recommendations:    -No plans for additional HD requirements today. Will plan for HD again tomorrow per OP schedule.   -Strict I/O's and daily weights  -Daily renal function panels   -Renally dose meds

## 2022-11-22 NOTE — SUBJECTIVE & OBJECTIVE
Interval History: HD performed on yesterday. Net UF 3 L.   Appears comfortable on NC. No distress on exam.     Review of patient's allergies indicates:  No Known Allergies  Current Facility-Administered Medications   Medication Frequency    acetaminophen tablet 650 mg Q8H PRN    acetaminophen tablet 650 mg Q4H PRN    albuterol-ipratropium 2.5 mg-0.5 mg/3 mL nebulizer solution 3 mL Q6H PRN    aluminum-magnesium hydroxide-simethicone 200-200-20 mg/5 mL suspension 30 mL QID PRN    aspirin EC tablet 81 mg Daily    atorvastatin tablet 40 mg Daily    carvediloL tablet 6.25 mg BID    cloNIDine 0.3 mg/24 hr td ptwk 1 patch Every Mon    dextrose 10% bolus 125 mL PRN    dextrose 10% bolus 250 mL PRN    diphenhydrAMINE capsule 25 mg Once    fluticasone furoate-vilanteroL 100-25 mcg/dose diskus inhaler 1 puff Daily    glucagon (human recombinant) injection 1 mg PRN    glucose chewable tablet 16 g PRN    glucose chewable tablet 24 g PRN    heparin (porcine) injection 5,000 Units Q8H    insulin aspart U-100 pen 0-5 Units QID (AC + HS) PRN    insulin aspart U-100 pen 3 Units TIDWM    insulin detemir U-100 pen 5 Units BID    isosorbide mononitrate 24 hr tablet 30 mg BID    LIDOcaine HCl 2% urojet PRN    melatonin tablet 6 mg Nightly PRN    naloxone 0.4 mg/mL injection 0.02 mg PRN    NIFEdipine 24 hr tablet 60 mg Daily    ondansetron disintegrating tablet 8 mg Q8H PRN    polyethylene glycol packet 17 g Daily    senna tablet 8.6 mg BID    sodium chloride 0.9% flush 10 mL Q12H PRN    tiotropium bromide 2.5 mcg/actuation inhaler 2 puff Daily       Objective:     Vital Signs (Most Recent):  Temp: 98 °F (36.7 °C) (11/22/22 0915)  Pulse: 79 (11/22/22 0933)  Resp: 16 (11/22/22 0933)  BP: (!) 165/89 (11/22/22 0915)  SpO2: (!) 94 % (11/22/22 0933)  O2 Device (Oxygen Therapy): nasal cannula w/ humidification (11/22/22 0930)   Vital Signs (24h Range):  Temp:  [97.5 °F (36.4 °C)-98.8 °F (37.1 °C)] 98 °F (36.7 °C)  Pulse:  [70-94] 79  Resp:   [13-18] 16  SpO2:  [84 %-96 %] 94 %  BP: (154-206)/(78-97) 165/89     Weight: 85 kg (187 lb 6.3 oz) (11/18/22 1816)  Body mass index is 27.67 kg/m².  Body surface area is 2.03 meters squared.    I/O last 3 completed shifts:  In: 2045.1 [P.O.:1395; I.V.:350.1; Other:300]  Out: 3650 [Other:3650]    Physical Exam  Vitals and nursing note reviewed.   Constitutional:       Appearance: He is not ill-appearing.      Interventions: Nasal cannula in place.   HENT:      Head: Normocephalic and atraumatic.      Nose: Nose normal.      Mouth/Throat:      Mouth: Mucous membranes are moist.      Pharynx: Oropharynx is clear.   Eyes:      Conjunctiva/sclera: Conjunctivae normal.   Cardiovascular:      Rate and Rhythm: Normal rate.      Comments: LUE AVF with +thrill and +bruit   Pulmonary:      Breath sounds: Rales present.   Abdominal:      General: There is distension.      Tenderness: There is no abdominal tenderness.   Musculoskeletal:      Cervical back: Normal range of motion and neck supple.      Right lower leg: Edema present.      Left lower leg: Edema present.   Skin:     General: Skin is warm and dry.   Neurological:      General: No focal deficit present.      Mental Status: He is alert and oriented to person, place, and time.   Psychiatric:         Mood and Affect: Mood normal.         Behavior: Behavior normal.       Significant Labs:  CBC:   Recent Labs   Lab 11/21/22  0423   WBC 6.25   RBC 3.17*   HGB 9.1*   HCT 30.0*      MCV 95   MCH 28.7   MCHC 30.3*     CMP:   Recent Labs   Lab 11/21/22  0423   *   CALCIUM 8.1*   ALBUMIN 2.7*   PROT 6.5   *   K 4.6   CO2 24      BUN 29*   CREATININE 4.5*   ALKPHOS 234*   ALT 29   AST 38   BILITOT 0.7     All labs within the past 24 hours have been reviewed.

## 2022-11-22 NOTE — ASSESSMENT & PLAN NOTE
- CHF is currently uncontrolled due to Continued edema of extremities and hypervolemia in the setting of renal failure  - Latest echo 10/12/2022: EF 40%, (+)DD, CVP 15 mmHg, PAP 65mmHg  - Continue Beta Blocker    - home diuretic: n/a may benefit from lasix    - hospital diuresis: s/p Lasix 100 mg on 11/19   - carvediloL (COREG) 3.125 MG tablet BID   - Place on fluid restriction of 1.5 L. Continue to stress to patient importance of self efficacy and  on diet for CHF.   - Monitor clinical status closely. Monitor on telemetry.   - Monitor strict Is&Os and daily weights.    - Last BNP reviewed- and noted below    - Repeat BNP pending  Recent Labs   Lab 11/18/22  1334   BNP 2,255*

## 2022-11-22 NOTE — ASSESSMENT & PLAN NOTE
Recent Labs   Lab 11/18/22  1334 11/18/22  1341 11/19/22  1357 11/21/22  0423   WBC 5.40  --  5.17 6.25   HGB 9.7*  --  8.8* 9.1*   HCT 31.1* 33* 29.2* 30.0*     --  199 238       - Target Hgb 10-11 gm/dL.  - Will request iron studies in AM to assess further needs of supplementation   - Will review chronic care plan from outpatient dialysis center for MARCIA dosing.

## 2022-11-22 NOTE — ASSESSMENT & PLAN NOTE
Chronic kidney disease-mineral and bone disorder  MWF schedule  Residual renal function?- Yes  - Nephrology consulted, appreciate recs   - No lab stick/BP intake on access site   - Continue to monitor intake and output, daily weights    - Please avoid gadolinium, fleets, phos-based laxatives, NSAIDs   - Will follow closely and continue dialysis treatments while in-patient   - Will request iron studies in AM to assess further needs of supplementation    - Will hold Epo due to HTN   - Renal diet with protein intake goal 1.5 g/kg/d   - Novasource with meals    - F/U PO4, Mg, Calcium. And albumin levels.    - Phos 2.4. Please hold binders.   - Continue chronic hemodialysis  - Holding calcium acetate,phosphat bind, (PHOSLO) 667 mg capsule TID per Nephro  - Monitor daily electrolytes and defer dialysis orders to nephrology  - Repeat labs pending

## 2022-11-22 NOTE — ASSESSMENT & PLAN NOTE
Patient's FSGs are uncontrolled due to hyperglycemia on current medication regimen.  Last A1c reviewed-   Lab Results   Component Value Date    HGBA1C 11.9 (H) 10/12/2022     Most recent fingerstick glucose reviewed-   Recent Labs   Lab 11/21/22 2156 11/22/22  1149   POCTGLUCOSE 347* 418*     Current correctional scale  Medium  Maintain anti-hyperglycemic dose as follows-   Antihyperglycemics (From admission, onward)    Start     Stop Route Frequency Ordered    11/22/22 2100  insulin detemir U-100 pen 7 Units         -- SubQ 2 times daily 11/22/22 1115    11/22/22 1130  insulin aspart U-100 pen 5 Units         -- SubQ 3 times daily with meals 11/22/22 1115    11/20/22 1820  insulin aspart U-100 pen 0-5 Units         -- SubQ Before meals & nightly PRN 11/20/22 1721        - Holding oral hypoglycemics during admission  - Increased insulin regimen as shown above

## 2022-11-22 NOTE — PLAN OF CARE
11/22/22 1427   Post-Acute Status   Post-Acute Authorization Placement   Post-Acute Placement Status Referrals Sent     Pt is a resident of [facility] and will return once he is medically stable. SW sent a referral via [facility] for review.    SW will continue to follow up.    Meme Payne LMSW  [facility]  Ext. [phone]

## 2022-11-22 NOTE — ASSESSMENT & PLAN NOTE
- chronic changes to bilateral lower extremities  - no active infection observed on exam  - Lower extremity edema L>R  - US BLE negative for DVT

## 2022-11-22 NOTE — PROGRESS NOTES
[facility]  Nephrology  Progress Note    Patient Name: Cosme Lewis  MRN: [id number]  Admission Date: 11/18/2022  Hospital Length of Stay: 2 days  Attending Provider: Britt Mason MD   Primary Care Physician: Primary Doctor No  Principal Problem:Acute HFrEF (heart failure with reduced ejection fraction)    Subjective:     Interval History: HD performed on yesterday. Net UF 3 L.   Appears comfortable on NC. No distress on exam.     Review of patient's allergies indicates:  No Known Allergies  Current Facility-Administered Medications   Medication Frequency    acetaminophen tablet 650 mg Q8H PRN    acetaminophen tablet 650 mg Q4H PRN    albuterol-ipratropium 2.5 mg-0.5 mg/3 mL nebulizer solution 3 mL Q6H PRN    aluminum-magnesium hydroxide-simethicone 200-200-20 mg/5 mL suspension 30 mL QID PRN    aspirin EC tablet 81 mg Daily    atorvastatin tablet 40 mg Daily    carvediloL tablet 6.25 mg BID    cloNIDine 0.3 mg/24 hr td ptwk 1 patch Every Mon    dextrose 10% bolus 125 mL PRN    dextrose 10% bolus 250 mL PRN    diphenhydrAMINE capsule 25 mg Once    fluticasone furoate-vilanteroL 100-25 mcg/dose diskus inhaler 1 puff Daily    glucagon (human recombinant) injection 1 mg PRN    glucose chewable tablet 16 g PRN    glucose chewable tablet 24 g PRN    heparin (porcine) injection 5,000 Units Q8H    insulin aspart U-100 pen 0-5 Units QID (AC + HS) PRN    insulin aspart U-100 pen 3 Units TIDWM    insulin detemir U-100 pen 5 Units BID    isosorbide mononitrate 24 hr tablet 30 mg BID    LIDOcaine HCl 2% urojet PRN    melatonin tablet 6 mg Nightly PRN    naloxone 0.4 mg/mL injection 0.02 mg PRN    NIFEdipine 24 hr tablet 60 mg Daily    ondansetron disintegrating tablet 8 mg Q8H PRN    polyethylene glycol packet 17 g Daily    senna tablet 8.6 mg BID    sodium chloride 0.9% flush 10 mL Q12H PRN    tiotropium bromide 2.5 mcg/actuation inhaler 2 puff Daily       Objective:     Vital Signs  (Most Recent):  Temp: 98 °F (36.7 °C) (11/22/22 0915)  Pulse: 79 (11/22/22 0933)  Resp: 16 (11/22/22 0933)  BP: (!) 165/89 (11/22/22 0915)  SpO2: (!) 94 % (11/22/22 0933)  O2 Device (Oxygen Therapy): nasal cannula w/ humidification (11/22/22 0930)   Vital Signs (24h Range):  Temp:  [97.5 °F (36.4 °C)-98.8 °F (37.1 °C)] 98 °F (36.7 °C)  Pulse:  [70-94] 79  Resp:  [13-18] 16  SpO2:  [84 %-96 %] 94 %  BP: (154-206)/(78-97) 165/89     Weight: 85 kg (187 lb 6.3 oz) (11/18/22 1816)  Body mass index is 27.67 kg/m².  Body surface area is 2.03 meters squared.    I/O last 3 completed shifts:  In: 2045.1 [P.O.:1395; I.V.:350.1; Other:300]  Out: 3650 [Other:3650]    Physical Exam  Vitals and nursing note reviewed.   Constitutional:       Appearance: He is not ill-appearing.      Interventions: Nasal cannula in place.   HENT:      Head: Normocephalic and atraumatic.      Nose: Nose normal.      Mouth/Throat:      Mouth: Mucous membranes are moist.      Pharynx: Oropharynx is clear.   Eyes:      Conjunctiva/sclera: Conjunctivae normal.   Cardiovascular:      Rate and Rhythm: Normal rate.      Comments: LUE AVF with +thrill and +bruit   Pulmonary:      Breath sounds: Rales present.   Abdominal:      General: There is distension.      Tenderness: There is no abdominal tenderness.   Musculoskeletal:      Cervical back: Normal range of motion and neck supple.      Right lower leg: Edema present.      Left lower leg: Edema present.   Skin:     General: Skin is warm and dry.   Neurological:      General: No focal deficit present.      Mental Status: He is alert and oriented to person, place, and time.   Psychiatric:         Mood and Affect: Mood normal.         Behavior: Behavior normal.       Significant Labs:  CBC:   Recent Labs   Lab 11/21/22  0423   WBC 6.25   RBC 3.17*   HGB 9.1*   HCT 30.0*      MCV 95   MCH 28.7   MCHC 30.3*     CMP:   Recent Labs   Lab 11/21/22  0423   *   CALCIUM 8.1*   ALBUMIN 2.7*   PROT 6.5    *   K 4.6   CO2 24      BUN 29*   CREATININE 4.5*   ALKPHOS 234*   ALT 29   AST 38   BILITOT 0.7     All labs within the past 24 hours have been reviewed.     Assessment/Plan:     * Acute HFrEF (heart failure with reduced ejection fraction)  - defer to primary team    Anemia in ESRD (end-stage renal disease)  Recent Labs   Lab 11/18/22  1334 11/18/22  1341 11/19/22  1357 11/21/22  0423   WBC 5.40  --  5.17 6.25   HGB 9.7*  --  8.8* 9.1*   HCT 31.1* 33* 29.2* 30.0*     --  199 238       - Target Hgb 10-11 gm/dL.  - Will request iron studies in AM to assess further needs of supplementation   - Will review chronic care plan from outpatient dialysis center for MARCIA dosing.     Chronic kidney disease-mineral and bone disorder  -Low phos diet     ESRD on dialysis  Outpatient HD Information:    -Outpatient HD unit: [facility]  -HD tx days: MWF  -HD tx time: 240mins  -Last HD tx prior to presentation: 11/16/22  -HD access: LUE AVF  -HD modality: iHD   -Residual urine: Anuric   -EDW: 79kg     Plan/Recommendations:    -No plans for additional HD requirements today. Will plan for HD again tomorrow per OP schedule.   -Strict I/O's and daily weights  -Daily renal function panels   -Renally dose meds          Thank you for your consult. I will follow-up with patient. Please contact us if you have any additional questions.    Rosi Grant, KUMAR, FNP-C  Nephrology  [facility]

## 2022-11-22 NOTE — ASSESSMENT & PLAN NOTE
Patient with Hypoxic Respiratory failure which is Chronic.  he is on home oxygen at 2-3 LPM. Supplemental oxygen was provided and noted. Labs and images were reviewed. Patient Has not had a recent ABG. Will treat underlying causes and adjust management of respiratory failure as follows-   - HD for volume removal; removed 6L so far  - HD scheduled for 11/23  - fluticasone-salmeterol diskus inhaler 250-50 mcg BID  - tiotropium bromide (SPIRIVA RESPIMAT) 2.5 mcg/actuation inhaler daily  - Val PRN

## 2022-11-22 NOTE — PROGRESS NOTES
[facility] - Observation [address] [place] [address] Medicine  Progress Note    Patient Name: Cosme Lewis  MRN: [id number]  Patient Class: IP- Inpatient   Admission Date: 11/18/2022  Length of Stay: 2 days  Attending Physician: Britt Mason MD  Primary Care Provider: Primary Doctor No        Subjective:     Principal Problem:Acute HFrEF (heart failure with reduced ejection fraction)        HPI:  Cosme Lewis is a 57 y.o. 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 Per ED MD, the facility believed he appeared more overloaded than normal and therefore sent him to the ED. The patient reports he was at his mother's home before being picked up by transportation. He states he passed out in while in transport and never arrived at the facility, he does not seem like the most reliable historian. He reports using supplemental oxygen at baseline, unsure of how many liters. He denies chest pain, worsening shortness of breath, diarrhea, abdominal pain, cough or sore throat. He has no awareness of his medications. He states he lives in a facility for people who are 55 and older. He denies tobacco and alcohol use.     ED: afebrile without leukocytosis.nephrology was consulted for HD. VSS. BNP 2,255. Troponin 0.035. admitted to hospital medicine for further management.        Overview/Hospital Course:  Cosme Lewis was admitted to hospital medicine for management of volume overload in the setting of missed HD. Nephro consulted, taken for HD with 3L fluid removal. Patient with confusion and agitation overnight, pulled out IV and pulled off telemetry. Given oral Depakote for non-redirectable agitation in setting of prolonged QT. Continued to appear fluid overloaded on exam, required several episodes of HD. Patient scheduled for inpatient HD on 11/23. Increased Nifedipine to 90 mg qD due to  hypertension. Increased prandial and long acting insulin regimen due to continued hyperglycemia. Holding phosphate binder per Nephrology.      Interval History: ROBERTO, patient hypertensive to 188/93. Increased Nifedipine to 90 mg qD. Patient reports requiring 2 L NC at home, however is on 4 L at this time with decreased oxygen sats. He endorses continued shortness of breath/orthopnea, but denies chest pain/tightness or light-headedness at this time. BNP pending given continued fluid overload on exam. Patient received HD on 11/21 and is scheduled for another session on 11/23. Per nephrology, holding phosphate binder due to hypophosphatemia. Will continue to monitor labs. Increased insulin regimen given continued hyperglycemia.    Review of Systems   Constitutional:  Negative for activity change, chills and fever.   HENT:  Positive for facial swelling (periorbital, perioral - improved). Negative for congestion, drooling and trouble swallowing.    Eyes:  Negative for photophobia and visual disturbance.   Respiratory:  Positive for shortness of breath. Negative for cough, chest tightness and wheezing.         Orthopnea    Cardiovascular:  Positive for leg swelling. Negative for chest pain and palpitations.   Gastrointestinal:  Negative for abdominal distention, abdominal pain, constipation, diarrhea, nausea and vomiting.   Endocrine: Negative for cold intolerance and heat intolerance.   Genitourinary:  Negative for difficulty urinating, dysuria, frequency, hematuria and urgency.   Musculoskeletal:  Negative for arthralgias, back pain and gait problem.   Skin:  Negative for color change, pallor and rash.   Allergic/Immunologic: Negative for immunocompromised state.   Neurological:  Negative for dizziness, syncope, weakness, light-headedness, numbness and headaches.   Psychiatric/Behavioral:  Positive for confusion (improved). Negative for agitation and behavioral problems. The patient is not nervous/anxious.       Objective:     Vital Signs (Most Recent):  Temp: 98.2 °F (36.8 °C) (11/22/22 1620)  Pulse: 79 (11/22/22 1620)  Resp: 18 (11/22/22 1620)  BP: (!) 154/80 (11/22/22 1620)  SpO2: (!) 91 % (11/22/22 1620)   Vital Signs (24h Range):  Temp:  [97.5 °F (36.4 °C)-98.8 °F (37.1 °C)] 98.2 °F (36.8 °C)  Pulse:  [70-85] 79  Resp:  [13-18] 18  SpO2:  [85 %-96 %] 91 %  BP: (154-188)/(78-93) 154/80     Weight: 85 kg (187 lb 6.3 oz)  Body mass index is 27.67 kg/m².    Intake/Output Summary (Last 24 hours) at 11/22/2022 1652  Last data filed at 11/22/2022 0546  Gross per 24 hour   Intake 535 ml   Output --   Net 535 ml      Physical Exam  Vitals and nursing note reviewed.   Constitutional:       General: He is not in acute distress.     Appearance: He is well-developed.   HENT:      Head: Normocephalic and atraumatic.      Comments: Periorbital and perioral facial swelling improved     Mouth/Throat:      Pharynx: No oropharyngeal exudate.   Eyes:      Conjunctiva/sclera: Conjunctivae normal.      Pupils: Pupils are equal, round, and reactive to light.   Cardiovascular:      Rate and Rhythm: Normal rate and regular rhythm.      Heart sounds: Normal heart sounds.      Arteriovenous access: Left arteriovenous access is present.  Pulmonary:      Effort: Pulmonary effort is normal. No respiratory distress.      Breath sounds: No stridor. Rales (diffuse rales througout all lung fields) present. No wheezing or rhonchi.      Comments: Work of breathing slightly improved  Abdominal:      General: Bowel sounds are normal. There is no distension.      Palpations: Abdomen is soft.      Tenderness: There is no abdominal tenderness.   Musculoskeletal:         General: No tenderness. Normal range of motion.      Cervical back: Normal range of motion and neck supple.      Right lower leg: 3+ Pitting Edema present.      Left lower leg: 3+ Pitting Edema present.   Lymphadenopathy:      Cervical: No cervical adenopathy.   Skin:     General: Skin is  warm and dry.      Capillary Refill: Capillary refill takes less than 2 seconds.      Findings: No rash.   Neurological:      Mental Status: He is alert and oriented to person, place, and time.      Cranial Nerves: No cranial nerve deficit.      Sensory: No sensory deficit.      Coordination: Coordination normal.   Psychiatric:         Behavior: Behavior normal.         Thought Content: Thought content normal.         Judgment: Judgment normal.       Significant Labs: All pertinent labs within the past 24 hours have been reviewed.    Significant Imaging: I have reviewed all pertinent imaging results/findings within the past 24 hours.      Assessment/Plan:      * Acute HFrEF (heart failure with reduced ejection fraction)  - CHF is currently uncontrolled due to Continued edema of extremities and hypervolemia in the setting of renal failure  - Latest echo 10/12/2022: EF 40%, (+)DD, CVP 15 mmHg, PAP 65mmHg  - Continue Beta Blocker    - home diuretic: n/a may benefit from lasix    - hospital diuresis: s/p Lasix 100 mg on 11/19   - carvediloL (COREG) 3.125 MG tablet BID   - Place on fluid restriction of 1.5 L. Continue to stress to patient importance of self efficacy and  on diet for CHF.   - Monitor clinical status closely. Monitor on telemetry.   - Monitor strict Is&Os and daily weights.    - Last BNP reviewed- and noted below    - Repeat BNP pending  Recent Labs   Lab 11/18/22  1334   BNP 2,255*       ESRD on dialysis  Chronic kidney disease-mineral and bone disorder  MWF schedule  Residual renal function?- Yes  - Nephrology consulted, appreciate recs   - No lab stick/BP intake on access site   - Continue to monitor intake and output, daily weights    - Please avoid gadolinium, fleets, phos-based laxatives, NSAIDs   - Will follow closely and continue dialysis treatments while in-patient   - Will request iron studies in AM to assess further needs of supplementation    - Will hold Epo due to HTN   - Renal diet  with protein intake goal 1.5 g/kg/d   - Novasource with meals    - F/U PO4, Mg, Calcium. And albumin levels.    - Phos 2.4. Please hold binders.   - Continue chronic hemodialysis  - Holding calcium acetate,phosphat bind, (PHOSLO) 667 mg capsule TID per Nephro  - Monitor daily electrolytes and defer dialysis orders to nephrology  - Repeat labs pending    Chronic hypoxemic respiratory failure  Patient with Hypoxic Respiratory failure which is Chronic.  he is on home oxygen at 2-3 LPM. Supplemental oxygen was provided and noted. Labs and images were reviewed. Patient Has not had a recent ABG. Will treat underlying causes and adjust management of respiratory failure as follows-   - HD for volume removal; removed 6L so far  - HD scheduled for 11/23  - fluticasone-salmeterol diskus inhaler 250-50 mcg BID  - tiotropium bromide (SPIRIVA RESPIMAT) 2.5 mcg/actuation inhaler daily  - DuoNebs PRN    Hypertensive urgency  Renovascular Hypertension  Patient has a current diagnosis of hypertensive urgency (without evidence of end organ damage) which is uncontrolled.  Latest blood pressure and vitals reviewed-   Temp:  [97.5 °F (36.4 °C)-98.8 °F (37.1 °C)]   Pulse:  [70-85]   Resp:  [13-18]   BP: (154-188)/(78-93)   SpO2:  [85 %-96 %] .   - Patient currently off IV antihypertensives.   - Home meds for hypertension were reviewed.   - Medication adjustment for hospital antihypertensives is as follows:   - Increased Nifedipine to 90 mg qD   - Carvedilol 6.25 mg BID   - Clonidine patch every Monday   - Imdur 30 mg BID  - Will continue to monitor vital signs.   - Will aim for controlled BP reduction by medications noted above. Monitor and mitigate end organ damage as indicated.    Agitation  - agitated and confused overnight on 11/19   - improved  - s/p oral depakote x1 with improvement   - continue to monitor    HLD (hyperlipidemia)  - Continue statin    Anemia in ESRD (end-stage renal disease)  - Current CBC reviewed-   Lab Results    Component Value Date    HGB 9.1 (L) 11/21/2022    HCT 30.0 (L) 11/21/2022     - Patient's anemia is currently controlled.   - Etiology likely d/t anemia of chronic disease, iron deficiency   - Monitor serial CBC and transfuse if patient becomes hemodynamically unstable, symptomatic or H/H drops below 7/21.     Type 1 diabetes mellitus with chronic kidney disease on chronic dialysis  Patient's FSGs are uncontrolled due to hyperglycemia on current medication regimen.  Last A1c reviewed-   Lab Results   Component Value Date    HGBA1C 11.9 (H) 10/12/2022     Most recent fingerstick glucose reviewed-   Recent Labs   Lab 11/21/22  2156 11/22/22  1149   POCTGLUCOSE 347* 418*     Current correctional scale  Medium  Maintain anti-hyperglycemic dose as follows-   Antihyperglycemics (From admission, onward)    Start     Stop Route Frequency Ordered    11/22/22 2100  insulin detemir U-100 pen 7 Units         -- SubQ 2 times daily 11/22/22 1115    11/22/22 1130  insulin aspart U-100 pen 5 Units         -- SubQ 3 times daily with meals 11/22/22 1115    11/20/22 1820  insulin aspart U-100 pen 0-5 Units         -- SubQ Before meals & nightly PRN 11/20/22 1721        - Holding oral hypoglycemics during admission  - Increased insulin regimen as shown above    Cellulitis of left lower extremity  - chronic changes to bilateral lower extremities  - no active infection observed on exam  - Lower extremity edema L>R  - US BLE negative for DVT      VTE Risk Mitigation (From admission, onward)         Ordered     heparin (porcine) injection 5,000 Units  Every 8 hours         11/20/22 1721     IP VTE HIGH RISK PATIENT  Once         11/20/22 1721     Place sequential compression device  Until discontinued         11/20/22 1721                Discharge Planning   GERA: 11/23/2022     Code Status: Full Code   Is the patient medically ready for discharge?: No    Reason for patient still in hospital (select all that apply): Patient trending  condition, Treatment and Consult recommendations  Discharge Plan A: Return to nursing home                  Landon Darby PA-C  Department of Hospital Medicine   [facility] - [facility] 11H

## 2022-11-22 NOTE — ASSESSMENT & PLAN NOTE
- Patient continues to be hypertensive, max BP of 188/93 today  - Increased Nifedipine to 90 mg qD  - Carvedilol 6.25 mg BID  - Clonidine patch every Monday  - Imdur 30 mg BID

## 2022-11-22 NOTE — ASSESSMENT & PLAN NOTE
- agitated and confused overnight on 11/19   - improved  - s/p oral depakote x1 with improvement   - continue to monitor

## 2022-11-23 NOTE — PLAN OF CARE
NURSING HOME ORDERS    11/23/2022  [facility]  [facility] - OBS[facility] 11H  [address]  [facility] [address]  Dept: [phone]  Loc: [phone]     Admit to Nursing Home:  [facility]    Diagnoses:  Active Hospital Problems    Diagnosis  POA    *Acute HFrEF (heart failure with reduced ejection fraction) [I50.21]  Yes    Hypertensive urgency [I16.0]  Yes    Agitation [R45.1]  Yes    Anemia in ESRD (end-stage renal disease) [N18.6, D63.1]  Yes    Renovascular hypertension [I15.0]  Yes    HLD (hyperlipidemia) [E78.5]  Yes    Chronic kidney disease-mineral and bone disorder [N18.9, E83.9, M89.9]  Yes    Chronic hypoxemic respiratory failure [J96.11]  Yes     Chronic    Type 1 diabetes mellitus with chronic kidney disease on chronic dialysis [E10.22, N18.6, Z99.2]  Not Applicable    Cellulitis of left lower extremity [L03.116]  Yes    ESRD on dialysis [N18.6, Z99.2]  Not Applicable      Resolved Hospital Problems   No resolved problems to display.       Patient is homebound due to:  Acute HFrEF (heart failure with reduced ejection fraction)    Allergies:Review of patient's allergies indicates:  No Known Allergies    Vitals:  Routine    Diet: renal diet and fluid restriction: 1.5 L    Activities:   Activity as tolerated    Goals of Care Treatment Preferences:  Code Status: Full Code    Labs:  per facility protocol    Nursing Precautions:  Fall    Oxygen Requirements: 2-3 L NC prn    Consults:   PT to evaluate and treat- 3 times a week and OT to evaluate and treat- 3 times a week       Diabetes Care:  SN to perform and educate Diabetic management with blood glucose monitoring:, Fingerstick blood sugar AC and HS, and Report CBG < 60 or > 350 to physician.      Medications: Discontinue all previous medication orders, if any. See new list below.     Medication List        CHANGE how you take these medications      carvediloL 3.125 MG tablet  Commonly known as: COREG  Take 2 tablets (6.25 mg  "total) by mouth 2 (two) times daily.  What changed: how much to take     hydrALAZINE 100 MG tablet  Commonly known as: APRESOLINE  Take 1 tablet (100 mg total) by mouth every 8 (eight) hours.  What changed:   when to take this  reasons to take this            CONTINUE taking these medications      acetaminophen 650 MG Tbsr  Commonly known as: TYLENOL  Take 650 mg by mouth every 8 (eight) hours as needed (pain or fever over 100.4).     albuterol 90 mcg/actuation inhaler  Commonly known as: PROVENTIL/VENTOLIN HFA  Inhale 2 puffs into the lungs every 6 (six) hours as needed for Wheezing or Shortness of Breath.     apixaban 5 mg Tab  Commonly known as: ELIQUIS  Take 1 tablet (5 mg total) by mouth 2 (two) times daily. Start this dose AFTER you have completed the 7 days of the 10 mg dose.     aspirin 81 MG EC tablet  Commonly known as: ECOTRIN  Take 81 mg by mouth once daily.     atorvastatin 40 MG tablet  Commonly known as: LIPITOR  Take 1 tablet (40 mg total) by mouth once daily.     BD ULTRA-FINE RODRÍGUEZ PEN NEEDLE 32 gauge x 5/32" Ndle  Generic drug: pen needle, diabetic  Use to inject 1 each into the skin 5 (five) times daily.     calcium acetate(phosphat bind) 667 mg capsule  Commonly known as: PHOSLO  Take 667 mg by mouth 3 (three) times daily.     cetirizine 10 MG tablet  Commonly known as: ZYRTEC  Take 10 mg by mouth daily as needed (itching).     cloNIDine 0.3 mg/24 hr td ptwk 0.3 mg/24 hr  Commonly known as: CATAPRES  Place 1 patch onto the skin every Saturday.     FLUoxetine 40 MG capsule  Take 40 mg by mouth once daily.     fluticasone-salmeterol 250-50 mcg/dose 250-50 mcg/dose diskus inhaler  Commonly known as: ADVAIR  Inhale 1 puff into the lungs 2 (two) times daily.     GLUCAGON EMERGENCY KIT (HUMAN) 1 mg Solr  Generic drug: glucagon  1 mg into the muscle as needed if CBG < 70     HYDROPHILIC CREAM TOP  Apply topically. Apply liberal amount to affected area twice daily for dry skin     isosorbide mononitrate " 30 MG 24 hr tablet  Commonly known as: IMDUR  Take 30 mg by mouth 2 (two) times daily.     LEVEMIR FLEXTOUCH U-100 INSULN 100 unit/mL (3 mL) Inpn pen  Generic drug: insulin detemir U-100  Inject 15 Units into the skin 2 (two) times daily. Once in the morning and once before bedtime. Max 30 units per day     melatonin 3 mg Tbdl  Take 9 mg by mouth nightly as needed (sleep).     NIFEdipine 60 MG Tbsr  Commonly known as: ADALAT CC  Take 120 mg by mouth every evening.     * NovoLOG Flexpen U-100 Insulin 100 unit/mL (3 mL) Inpn pen  Generic drug: insulin aspart U-100  Inject 10 Units into the skin 3 (three) times daily with meals. Max 30 units per day     * NOVOLOG FLEXPEN U-100 INSULIN SUBQ  Inject into the skin before meals and at bedtime per sliding scale: 0-60= OJ or glucose tab; = 0; 201-250= 2; 251-300= 4; 301-350= 6; 351-400= 8; greater than 400= 10 units then call MD     sodium chloride 0.65 % nasal spray  Commonly known as: OCEAN  2 sprays by Nasal route every 4 (four) hours as needed for Congestion.     tiotropium bromide 2.5 mcg/actuation inhaler  Commonly known as: SPIRIVA RESPIMAT  Inhale 2 puffs into the lungs Daily.     triamcinolone acetonide 0.025 % Lotn  Apply topically. Apply to the affected area twice daily     vitamin renal formula (B-complex-vitamin c-folic acid) 1 mg Cap  Commonly known as: NEPHROCAP  Take 1 capsule by mouth once daily.           * This list has 2 medication(s) that are the same as other medications prescribed for you. Read the directions carefully, and ask your doctor or other care provider to review them with you.                STOP taking these medications      oxyCODONE-acetaminophen 5-325 mg per tablet  Commonly known as: PERCOCET                Immunizations Administered as of 11/23/2022       No immunizations on file.            This patient has had both covid vaccinations    Some patients may experience side effects after vaccination.  These may include fever,  headache, muscle or joint aches.  Most symptoms resolve with 24-48 hours and do not require urgent medical evaluation unless they persist for more than 72 hours or symptoms are concerning for an unrelated medical condition.          _________________________________  Britt Mason MD  11/23/2022

## 2022-11-23 NOTE — PLAN OF CARE
Problem: Fluid Volume Excess (Chronic Kidney Disease)  Goal: Fluid Balance  Outcome: Ongoing, Progressing     Problem: Diabetes Comorbidity  Goal: Blood Glucose Level Within Targeted Range  Outcome: Ongoing, Progressing     Problem: Skin Injury Risk Increased  Goal: Skin Health and Integrity  Outcome: Ongoing, Progressing     Problem: Fluid Volume Excess (Chronic Kidney Disease)  Goal: Fluid Balance  Outcome: Ongoing, Progressing

## 2022-11-23 NOTE — PLAN OF CARE
11/23/22 1411   Post-Acute Status   Post-Acute Authorization Placement   Post-Acute Placement Status Set-up Complete/Auth obtained     Pt is a resident of [facility]. SW was notified by Ngozi that the orders are good. Once pt is medically stable, he is good to return to facility.    Meme Payne LMSW  [facility]  Ext. [phone]

## 2022-11-23 NOTE — PROGRESS NOTES
[facility] HEMODIALYSIS NOTE    Cosme Lewis is a 57 y.o. male currently on hemodialysis for removal of uremic toxins and volume.     Patient seen and evaluated on hemodialysis, tolerating treatment, see HD flowsheet for vitals and assessments.    No Hypotension, chest pain, shortness of breath, cramping, nausea or vomiting.      Labs have been reviewed and the dialysate bath has been adjusted.     Labs:     Recent Labs   Lab 11/18/22  1334 11/19/22  1357 11/21/22  0423   * 138 134*   K 4.2 4.2 4.6   CL 93* 102 102   CO2 26 24 24   BUN 29* 23* 29*   CREATININE 4.9* 4.3* 4.5*   CALCIUM 7.8* 8.0* 8.1*   PHOS 2.7 2.5* 2.4*     Recent Labs   Lab 11/18/22  1334 11/18/22  1341 11/19/22  1357 11/21/22  0423   WBC 5.40  --  5.17 6.25   HGB 9.7*  --  8.8* 9.1*   HCT 31.1* 33* 29.2* 30.0*     --  199 238     No results found for: FESATURATED, FERRITIN     Assessment/Plan      Ultrafiltration goal: Liters: 1-2L, 3L. Maintain MAP > 65 mmHg.    Duration:  3.5 hrs     - Seen on dialysis today, tolerating session with current UFR, no complications.  Patient appears hypervolemic on exam but oxygenating 5 L NC. CXR from 11/21 w pulmonary edema.   - Recommend repeat CXR  - Recommend ECHO  - Will plan for UF only treatment for additional volume removal if patient remains inpatient   - No lab stick/BP intake on access site  - Continue to monitor intake and output, daily weights   - Please avoid gadolinium, fleets, phos-based laxatives, NSAIDs  - Will follow closely and continue dialysis treatments while in-patient    Anemia  - Hgb 9.1  - Will collect iron studies  - Will hold Epogen due to HTN    BMM  - Renal diet with protein intake goal 1.5 g/kg/d  - Novasource with meals   - F/U PO4, Mg, Calcium. And albumin levels.   - Phos 2.4.    HTN  - BP Elevated  - Goal for BP <140mmHg SBP and <90 mmHg DBP.   - Continue home antihypertensive regimen; adjust as needed.       Rosi Grant DNP, APRN, FNP-C  Nephrology  Department  Pager:  [phone]

## 2022-11-23 NOTE — PROGRESS NOTES
3 hour of HD complete and 1 hour of UF only complete.      Net UF 4L.  Tolerated well.    Needles removed from UMA fistula.  Pressure held x 5 minutes.  Hemostasis achieved.  Covered with gauze and paper tape.  +thrill +bruit.    Transported from [facility] to room 1110 via stretcher by transporter.

## 2022-11-24 NOTE — SUBJECTIVE & OBJECTIVE
Interval History: NAEON. On 5 L NC. Patient continues to be hypertensive. Resumed home Hydralazine. Transitioned patient from heparin to home Eliquis. Echo with EF of 63%, grade II left ventricular diastolic dysfunction, CVP of 15 mmHg, and moderate-severe pulmonary hypertension. CXR pending. HD performed today, removed 4350 mL of fluid. Patient reports improvement in SOB. States he's ready to go back to NH. Discussed plan of care with patient, patient agreeable to staying but would like to be discharged tomorrow. Nephrology to evaluate patient tomorrow. His next HD session is Friday, 11/25, unless Nephro feels he needs one prior. He denies any chest pain, headache, cough, N/V, abdominal pain, D/C.     Review of Systems   Constitutional:  Negative for activity change, chills and fever.   HENT:  Positive for facial swelling (periorbital, perioral - improved). Negative for congestion, drooling and trouble swallowing.    Eyes:  Negative for photophobia and visual disturbance.   Respiratory:  Positive for shortness of breath. Negative for cough, chest tightness and wheezing.         Orthopnea    Cardiovascular:  Positive for leg swelling. Negative for chest pain and palpitations.   Gastrointestinal:  Negative for abdominal distention, abdominal pain, constipation, diarrhea, nausea and vomiting.   Endocrine: Negative for cold intolerance and heat intolerance.   Genitourinary:  Negative for difficulty urinating, dysuria, frequency, hematuria and urgency.   Musculoskeletal:  Negative for arthralgias, back pain and gait problem.   Skin:  Negative for color change, pallor and rash.   Allergic/Immunologic: Negative for immunocompromised state.   Neurological:  Negative for dizziness, syncope, weakness, light-headedness, numbness and headaches.   Psychiatric/Behavioral:  Positive for confusion (resolved). Negative for agitation and behavioral problems. The patient is not nervous/anxious.      Objective:     Vital Signs (Most  Recent):  Temp: 97.5 °F (36.4 °C) (22 1605)  Pulse: 73 (22 1605)  Resp: 18 (22 1605)  BP: (!) 157/81 (22 1605)  SpO2: (!) 92 % (22 1605)   Vital Signs (24h Range):  Temp:  [97 °F (36.1 °C)-97.9 °F (36.6 °C)] 97.5 °F (36.4 °C)  Pulse:  [68-90] 73  Resp:  [12-20] 18  SpO2:  [92 %-96 %] 92 %  BP: (123-197)/() 157/81     Weight: 84.8 kg (187 lb)  Body mass index is 27.62 kg/m².    Intake/Output Summary (Last 24 hours) at 2022 1812  Last data filed at 2022 1300  Gross per 24 hour   Intake 300 ml   Output 4650 ml   Net -4350 ml      Physical Exam  Vitals and nursing note reviewed.   Constitutional:       General: He is not in acute distress.     Appearance: He is well-developed.   HENT:      Head: Normocephalic and atraumatic.      Comments: Periorbital and perioral facial swelling improved     Mouth/Throat:      Pharynx: No oropharyngeal exudate.   Eyes:      Conjunctiva/sclera: Conjunctivae normal.      Pupils: Pupils are equal, round, and reactive to light.   Cardiovascular:      Rate and Rhythm: Normal rate and regular rhythm.      Heart sounds: Normal heart sounds.      Arteriovenous access: Left arteriovenous access is present.  Pulmonary:      Effort: Pulmonary effort is normal. No respiratory distress.      Breath sounds: No stridor. Rales (improved) present. No wheezing or rhonchi.      Comments: Work of breathing slightly improved  Abdominal:      General: Bowel sounds are normal. There is no distension.      Palpations: Abdomen is soft.      Tenderness: There is no abdominal tenderness.   Musculoskeletal:         General: No tenderness. Normal range of motion.      Cervical back: Normal range of motion and neck supple.      Right lower le+ Pitting Edema present.      Left lower le+ Pitting Edema present.   Lymphadenopathy:      Cervical: No cervical adenopathy.   Skin:     General: Skin is warm and dry.      Capillary Refill: Capillary refill takes less  than 2 seconds.      Findings: No rash.   Neurological:      Mental Status: He is alert and oriented to person, place, and time.      Cranial Nerves: No cranial nerve deficit.      Sensory: No sensory deficit.      Coordination: Coordination normal.   Psychiatric:         Behavior: Behavior normal.         Thought Content: Thought content normal.         Judgment: Judgment normal.       Significant Labs: All pertinent labs within the past 24 hours have been reviewed.    Significant Imaging: I have reviewed all pertinent imaging results/findings within the past 24 hours.

## 2022-11-24 NOTE — SUBJECTIVE & OBJECTIVE
Interval History:     Underwent HD yesterday with ~4L UF removed. This AM patient is still SOB with 5L nasal cannula requirements. No improvement in lower extremity edema, has JVD. Scheduled to be discharged to nursing home today. Plan for UF session of 3L prior to transfer to nursing home.     Review of patient's allergies indicates:  No Known Allergies  Current Facility-Administered Medications   Medication Frequency    acetaminophen tablet 650 mg Q8H PRN    acetaminophen tablet 650 mg Q4H PRN    albuterol-ipratropium 2.5 mg-0.5 mg/3 mL nebulizer solution 3 mL Q6H PRN    aluminum-magnesium hydroxide-simethicone 200-200-20 mg/5 mL suspension 30 mL QID PRN    apixaban tablet 5 mg BID    aspirin EC tablet 81 mg Daily    atorvastatin tablet 40 mg Daily    carvediloL tablet 6.25 mg BID    cloNIDine 0.3 mg/24 hr td ptwk 1 patch Every Mon    dextrose 10% bolus 125 mL PRN    dextrose 10% bolus 250 mL PRN    diphenhydrAMINE capsule 25 mg Once    fluticasone furoate-vilanteroL 100-25 mcg/dose diskus inhaler 1 puff Daily    glucagon (human recombinant) injection 1 mg PRN    glucose chewable tablet 16 g PRN    glucose chewable tablet 24 g PRN    hydrALAZINE tablet 100 mg TID    insulin aspart U-100 pen 0-5 Units QID (AC + HS) PRN    insulin aspart U-100 pen 6 Units TIDWM    insulin detemir U-100 pen 8 Units BID    isosorbide mononitrate 24 hr tablet 30 mg BID    LIDOcaine HCl 2% urojet PRN    melatonin tablet 6 mg Nightly PRN    mupirocin 2 % ointment BID    naloxone 0.4 mg/mL injection 0.02 mg PRN    NIFEdipine 24 hr tablet 90 mg Daily    ondansetron disintegrating tablet 8 mg Q8H PRN    polyethylene glycol packet 17 g Daily    senna tablet 8.6 mg BID    sodium chloride 0.9% flush 10 mL Q12H PRN    tiotropium bromide 2.5 mcg/actuation inhaler 2 puff Daily       Objective:     Vital Signs (Most Recent):  Temp: 97.6 °F (36.4 °C) (11/24/22 0939)  Pulse: 70 (11/24/22 1000)  Resp: 18 (11/24/22 0939)  BP: (!) 155/88 (11/24/22  1000)  SpO2: (!) 93 % (11/24/22 0753)  O2 Device (Oxygen Therapy): nasal cannula (11/24/22 0924)   Vital Signs (24h Range):  Temp:  [97.4 °F (36.3 °C)-98.8 °F (37.1 °C)] 97.6 °F (36.4 °C)  Pulse:  [67-90] 70  Resp:  [18] 18  SpO2:  [92 %-95 %] 93 %  BP: (132-190)/() 155/88     Weight: 84.8 kg (187 lb) (11/23/22 1300)  Body mass index is 27.62 kg/m².  Body surface area is 2.03 meters squared.    I/O last 3 completed shifts:  In: 840 [P.O.:540; Other:300]  Out: 4650 [Other:4650]    Physical Exam  Vitals and nursing note reviewed.   Constitutional:       Appearance: He is not ill-appearing.      Interventions: Nasal cannula in place.   HENT:      Head: Normocephalic and atraumatic.      Nose: Nose normal.      Mouth/Throat:      Mouth: Mucous membranes are moist.      Pharynx: Oropharynx is clear.   Eyes:      Conjunctiva/sclera: Conjunctivae normal.   Neck:      Comments: Positive JVD  Cardiovascular:      Rate and Rhythm: Normal rate.      Comments: LUE AVF with +thrill and +bruit   Pulmonary:      Breath sounds: Rales present.   Abdominal:      General: There is distension.      Tenderness: There is no abdominal tenderness.   Musculoskeletal:      Cervical back: Normal range of motion and neck supple.      Right lower leg: Edema present.      Left lower leg: Edema present.   Skin:     General: Skin is warm and dry.   Neurological:      General: No focal deficit present.      Mental Status: He is alert and oriented to person, place, and time.   Psychiatric:         Mood and Affect: Mood normal.         Behavior: Behavior normal.       Significant Labs:  All labs within the past 24 hours have been reviewed.     Significant Imaging:  Labs: Reviewed

## 2022-11-24 NOTE — PROGRESS NOTES
[facility] [address] [place] [address] Medicine  Progress Note    Patient Name: Cosme Lewis  MRN: [id number]  Patient Class: IP- Inpatient   Admission Date: 11/18/2022  Length of Stay: 4 days  Attending Physician: Jose Daly MD  Primary Care Provider: Primary Doctor No        Subjective:     Principal Problem:Acute HFrEF (heart failure with reduced ejection fraction)        HPI:  Cosme Lewis is a 57 y.o. 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 Per ED MD, the facility believed he appeared more overloaded than normal and therefore sent him to the ED. The patient reports he was at his mother's home before being picked up by transportation. He states he passed out in while in transport and never arrived at the facility, he does not seem like the most reliable historian. He reports using supplemental oxygen at baseline, unsure of how many liters. He denies chest pain, worsening shortness of breath, diarrhea, abdominal pain, cough or sore throat. He has no awareness of his medications. He states he lives in a facility for people who are 55 and older. He denies tobacco and alcohol use.     ED: afebrile without leukocytosis.nephrology was consulted for HD. VSS. BNP 2,255. Troponin 0.035. admitted to hospital medicine for further management.        Overview/Hospital Course:  Cosme Lewis was admitted to hospital medicine for management of volume overload in the setting of missed HD. Nephro consulted, taken for HD with 3L fluid removal. Patient with confusion and agitation overnight, pulled out IV and pulled off telemetry. Given oral Depakote for non-redirectable agitation in setting of prolonged QT. Continued to appear fluid overloaded on exam, required several episodes of HD. Increased Nifedipine to 90 mg qD due to hypertension. Increased prandial and long  acting insulin regimen due to continued hyperglycemia. Holding phosphate binder per Nephrology. HD with 4350 mL fluid removed. Resumed home Hydralazine. Echo with EF of 63%, grade II left ventricular diastolic dysfunction, CVP of 15 mmHg, and moderate-severe pulmonary hypertension. CXR pending.        Interval History: NAEON. Requiring 5 L NC to maintain O2 saturations. Hypertensive but improved from earlier in admission.   Evaluated at bedside, no complaints at this time. Still appears clinically volume overloaded, facial swelling non-improved from previous. No chest pain, abdominal pain, nausea or vomiting. All questions answered at this time.    Review of Systems   Constitutional:  Negative for activity change, chills and fever.   HENT:  Positive for facial swelling (periorbital, perioral - improved). Negative for congestion, drooling and trouble swallowing.    Eyes:  Negative for photophobia and visual disturbance.   Respiratory:  Positive for shortness of breath. Negative for cough, chest tightness and wheezing.         Orthopnea    Cardiovascular:  Positive for leg swelling. Negative for chest pain and palpitations.   Gastrointestinal:  Negative for abdominal distention, abdominal pain, constipation, diarrhea, nausea and vomiting.   Endocrine: Negative for cold intolerance and heat intolerance.   Genitourinary:  Negative for difficulty urinating, dysuria, frequency, hematuria and urgency.   Musculoskeletal:  Negative for arthralgias, back pain and gait problem.   Skin:  Negative for color change, pallor and rash.   Allergic/Immunologic: Negative for immunocompromised state.   Neurological:  Negative for dizziness, syncope, weakness, light-headedness, numbness and headaches.   Psychiatric/Behavioral:  Positive for confusion (resolved). Negative for agitation and behavioral problems. The patient is not nervous/anxious.    Objective:     Vital Signs (Most Recent):  Temp: 98 °F (36.7 °C) (11/24/22 1312)  Pulse: 68  (22 1549)  Resp: 18 (22 0939)  BP: (!) 170/99 (22 1312)  SpO2: (!) 93 % (22 0753)   Vital Signs (24h Range):  Temp:  [97.4 °F (36.3 °C)-98.8 °F (37.1 °C)] 98 °F (36.7 °C)  Pulse:  [67-79] 68  Resp:  [18] 18  SpO2:  [93 %-95 %] 93 %  BP: (132-192)/() 170/99     Weight: 84.8 kg (187 lb)  Body mass index is 27.62 kg/m².    Intake/Output Summary (Last 24 hours) at 2022 1720  Last data filed at 2022 1312  Gross per 24 hour   Intake 1140 ml   Output 3600 ml   Net -2460 ml      Physical Exam  Vitals and nursing note reviewed.   Constitutional:       General: He is not in acute distress.     Appearance: He is well-developed.   HENT:      Head: Normocephalic and atraumatic.      Comments: Periorbital and perioral facial swelling improved     Mouth/Throat:      Pharynx: No oropharyngeal exudate.   Eyes:      Conjunctiva/sclera: Conjunctivae normal.      Pupils: Pupils are equal, round, and reactive to light.   Cardiovascular:      Rate and Rhythm: Normal rate and regular rhythm.      Heart sounds: Normal heart sounds.      Arteriovenous access: Left arteriovenous access is present.  Pulmonary:      Effort: Pulmonary effort is normal. No respiratory distress.      Breath sounds: No stridor. No wheezing or rhonchi.      Comments: Work of breathing slightly improved  Abdominal:      General: Bowel sounds are normal. There is no distension.      Palpations: Abdomen is soft.      Tenderness: There is no abdominal tenderness.   Musculoskeletal:         General: No tenderness. Normal range of motion.      Cervical back: Normal range of motion and neck supple.      Right lower le+ Pitting Edema present.      Left lower le+ Pitting Edema present.   Lymphadenopathy:      Cervical: No cervical adenopathy.   Skin:     General: Skin is warm and dry.      Capillary Refill: Capillary refill takes less than 2 seconds.      Findings: No rash.   Neurological:      Mental Status: He is alert and  oriented to person, place, and time.      Cranial Nerves: No cranial nerve deficit.      Sensory: No sensory deficit.      Coordination: Coordination normal.   Psychiatric:         Behavior: Behavior normal.         Thought Content: Thought content normal.         Judgment: Judgment normal.       Significant Labs: All pertinent labs within the past 24 hours have been reviewed.  CBC:   Recent Labs   Lab 11/23/22  0858   WBC 4.72   HGB 9.3*   HCT 29.9*        CMP:   Recent Labs   Lab 11/23/22 0858      K 4.4      CO2 23   *   BUN 38*   CREATININE 5.0*   CALCIUM 8.2*   PROT 6.4   ALBUMIN 2.6*   BILITOT 0.7   ALKPHOS 258*   AST 35   ALT 36   ANIONGAP 9     Cardiac Markers:   Recent Labs   Lab 11/23/22 0858   BNP 3,024*       Significant Imaging: I have reviewed all pertinent imaging results/findings within the past 24 hours.    Imaging Results              X-Ray Chest AP Portable (Final result)  Result time 11/18/22 14:07:39      Final result by Nazario Fernandes MD (11/18/22 14:07:39)                   Impression:      Findings suggesting pulmonary edema/CHF pattern and trace bilateral pleural effusions, to a lesser degree from prior.  Other nonspecific infectious/inflammatory pneumonitis could present similarly.      Electronically signed by: Nazario Fernandes MD  Date:    11/18/2022  Time:    14:07               Narrative:    EXAMINATION:  XR CHEST AP PORTABLE    CLINICAL HISTORY:  Shortness of breath    TECHNIQUE:  Single frontal view of the chest was performed.    COMPARISON:  Chest radiograph and CT thorax 10/12/2022    FINDINGS:  Monitoring leads overlie the chest.  Patient is slightly rotated.    Cardiomediastinal silhouette is midline and prominent similar to prior.  There is continued prominence of the central pulmonary vasculature with bilateral diffuse nonspecific interstitial coarsening and patchy ground-glass opacities suggesting pulmonary edema/CHF pattern, slightly lesser degree from  prior.  Suspected trace bilateral pleural effusions, decreased in volume from prior.  Scattered bandlike opacities at the lung bases consistent with platelike scarring versus atelectasis.  No large consolidation or pneumothorax.  No acute osseous process seen.  PA and lateral views can be obtained.                                      Assessment/Plan:      * Acute HFrEF (heart failure with reduced ejection fraction)  - CHF is currently uncontrolled due to Continued edema of extremities and hypervolemia in the setting of renal failure  - Latest echo 10/12/2022: EF 40%, (+)DD, CVP 15 mmHg, PAP 65mmHg  - Continue Beta Blocker    - home diuretic: n/a may benefit from Lasix    - hospital diuresis: s/p Lasix 100 mg on 11/19   - carvediloL (COREG) 3.125 MG tablet BID   - Place on fluid restriction of 1.5 L. Continue to stress to patient importance of self efficacy and  on diet for CHF.   - Monitor clinical status closely. Monitor on telemetry.   - Monitor strict Is&Os and daily weights.    - Last BNP reviewed- and noted below    - Repeat BNP below  - CXR pending to monitor volume status  - Echo with EF of 63%, grade II left ventricular diastolic dysfunction, CVP of 15 mmHg, and moderate-severe pulmonary hypertension.   Recent Labs   Lab 11/23/22  0858   BNP 3,024*       Hypertensive urgency  Renovascular Hypertension  Patient has a current diagnosis of hypertensive urgency (without evidence of end organ damage) which is uncontrolled.  Latest blood pressure and vitals reviewed-   Temp:  [97.4 °F (36.3 °C)-98.8 °F (37.1 °C)]   Pulse:  [67-79]   Resp:  [18]   BP: (132-192)/()   SpO2:  [93 %-95 %] .   - Patient currently off IV antihypertensives.   - Home meds for hypertension were reviewed.   - Medication adjustment for hospital antihypertensives is as follows:   - Nifedipine to 90 mg qD   - Carvedilol 6.25 mg BID   - Clonidine patch every Monday   - Imdur 30 mg BID   - resumed Hydralazine 100 mg TID  - Will  continue to monitor vital signs.   - Will aim for controlled BP reduction by medications noted above. Monitor and mitigate end organ damage as indicated.    Agitation  - agitated and confused overnight on 11/19   - improved  - s/p oral depakote x1 with improvement   - continue to monitor    HLD (hyperlipidemia)  - Continue statin    Renovascular hypertension  - isosorbide mononitrate (IMDUR) 30 MG 24 hr tablet BID   - NIFEdipine (ADALAT CC) 120 MG TbSR daily   - hydrALAZINE (APRESOLINE) 100 MG tablet TID PRN   - carvediloL (COREG) 3.125 MG tablet BID   - cloNIDine 0.3 mg/24 hr td ptwk (CATAPRES) 0.3 mg/24 hr qweekly    Anemia in ESRD (end-stage renal disease)  - Current CBC reviewed-   Lab Results   Component Value Date    HGB 9.3 (L) 11/23/2022    HCT 29.9 (L) 11/23/2022     - Patient's anemia is currently controlled.   - Etiology likely d/t anemia of chronic disease, iron deficiency   - Monitor serial CBC and transfuse if patient becomes hemodynamically unstable, symptomatic or H/H drops below 7/21.     Chronic hypoxemic respiratory failure  Patient with Hypoxic Respiratory failure which is Chronic.  he is on home oxygen at 2-3 LPM. Supplemental oxygen was provided and noted. Labs and images were reviewed. Patient Has not had a recent ABG. Will treat underlying causes and adjust management of respiratory failure as follows-   - HD for volume removal; removed 6L so far  - HD today, removed 4350 mL  - fluticasone-salmeterol diskus inhaler 250-50 mcg BID  - tiotropium bromide (SPIRIVA RESPIMAT) 2.5 mcg/actuation inhaler daily  - DuoNebs PRN    Type 1 diabetes mellitus with chronic kidney disease on chronic dialysis  Patient's FSGs are uncontrolled due to hyperglycemia on current medication regimen.  Last A1c reviewed-   Lab Results   Component Value Date    HGBA1C 11.9 (H) 10/12/2022     Most recent fingerstick glucose reviewed-   Recent Labs   Lab 11/23/22  1949 11/24/22  0754 11/24/22  1242 11/24/22  1635    POCTGLUCOSE 276* 292* 130* 250*     Current correctional scale  Medium  Maintain anti-hyperglycemic dose as follows-   Antihyperglycemics (From admission, onward)    Start     Stop Route Frequency Ordered    11/24/22 0715  insulin aspart U-100 pen 6 Units         -- SubQ 3 times daily with meals 11/23/22 1828    11/23/22 2100  insulin detemir U-100 pen 8 Units         -- SubQ 2 times daily 11/23/22 1828 11/20/22 1820  insulin aspart U-100 pen 0-5 Units         -- SubQ Before meals & nightly PRN 11/20/22 1721        - Holding oral hypoglycemics during admission  - Increased insulin regimen as shown above    ESRD on dialysis  Chronic kidney disease-mineral and bone disorder  MWF schedule  Residual renal function?- Yes  - Nephrology consulted, appreciate recs   - Recommend repeat CXR   - Recommend ECHO   - Will plan for UF only treatment for additional volume removal if patient remains inpatient    - No lab stick/BP intake on access site   - Continue to monitor intake and output, daily weights    - Please avoid gadolinium, fleets, phos-based laxatives, NSAIDs   - Will follow closely and continue dialysis treatments while in-patient   - Will collect iron studies   - Will hold Epogen due to HTN   - Continue chronic hemodialysis   - Renal diet with protein intake goal 1.5 g/kg/d   - Novasource with meals    - F/U PO4, Mg, Calcium. And albumin levels.    - Phos 1.8. Please hold binders.   - Holding calcium acetate,phosphat bind, (PHOSLO) 667 mg capsule TID per Nephro  - Monitor daily electrolytes and defer dialysis orders to nephrology    Cellulitis of left lower extremity  - chronic changes to bilateral lower extremities  - no active infection observed on exam  - Lower extremity edema L>R  - US BLE negative for DVT      VTE Risk Mitigation (From admission, onward)         Ordered     apixaban tablet 5 mg  2 times daily         11/23/22 1133     IP VTE HIGH RISK PATIENT  Once         11/20/22 1721     Place sequential  compression device  Until discontinued         11/20/22 1721                Discharge Planning   GERA: 11/25/2022     Code Status: Full Code   Is the patient medically ready for discharge?: No    Reason for patient still in hospital (select all that apply): Treatment and Consult recommendations  Discharge Plan A: Return to nursing home          Alissa Song PA-C  Department of Hospital Medicine   [facility]

## 2022-11-24 NOTE — PROGRESS NOTES
[facility]ation [address] [place] [address] Medicine  Progress Note    Patient Name: Cosme Lewis  MRN: [id number]  Patient Class: IP- Inpatient   Admission Date: 11/18/2022  Length of Stay: 3 days  Attending Physician: Britt Mason MD  Primary Care Provider: Primary Doctor No        Subjective:     Principal Problem:Acute HFrEF (heart failure with reduced ejection fraction)        HPI:  Cosme Lewis is a 57 y.o. 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 Per ED MD, the facility believed he appeared more overloaded than normal and therefore sent him to the ED. The patient reports he was at his mother's home before being picked up by transportation. He states he passed out in while in transport and never arrived at the facility, he does not seem like the most reliable historian. He reports using supplemental oxygen at baseline, unsure of how many liters. He denies chest pain, worsening shortness of breath, diarrhea, abdominal pain, cough or sore throat. He has no awareness of his medications. He states he lives in a facility for people who are 55 and older. He denies tobacco and alcohol use.     ED: afebrile without leukocytosis.nephrology was consulted for HD. VSS. BNP 2,255. Troponin 0.035. admitted to hospital medicine for further management.        Overview/Hospital Course:  Cosme Lewis was admitted to hospital medicine for management of volume overload in the setting of missed HD. Nephro consulted, taken for HD with 3L fluid removal. Patient with confusion and agitation overnight, pulled out IV and pulled off telemetry. Given oral Depakote for non-redirectable agitation in setting of prolonged QT. Continued to appear fluid overloaded on exam, required several episodes of HD. Increased Nifedipine to 90 mg qD due to hypertension. Increased prandial and long  acting insulin regimen due to continued hyperglycemia. Holding phosphate binder per Nephrology. HD with 4350 mL fluid removed. Resumed home Hydralazine. Echo with EF of 63%, grade II left ventricular diastolic dysfunction, CVP of 15 mmHg, and moderate-severe pulmonary hypertension. CXR pending.        Interval History: NAEON. On 5 L NC. Patient continues to be hypertensive. Resumed home Hydralazine. Transitioned patient from heparin to home Eliquis. Echo with EF of 63%, grade II left ventricular diastolic dysfunction, CVP of 15 mmHg, and moderate-severe pulmonary hypertension. CXR pending. HD performed today, removed 4350 mL of fluid. Patient reports improvement in SOB. States he's ready to go back to NH. Discussed plan of care with patient, patient agreeable to staying but would like to be discharged tomorrow. Nephrology to evaluate patient tomorrow. His next HD session is Friday, 11/25, unless Nephro feels he needs one prior. He denies any chest pain, headache, cough, N/V, abdominal pain, D/C.     Review of Systems   Constitutional:  Negative for activity change, chills and fever.   HENT:  Positive for facial swelling (periorbital, perioral - improved). Negative for congestion, drooling and trouble swallowing.    Eyes:  Negative for photophobia and visual disturbance.   Respiratory:  Positive for shortness of breath. Negative for cough, chest tightness and wheezing.         Orthopnea    Cardiovascular:  Positive for leg swelling. Negative for chest pain and palpitations.   Gastrointestinal:  Negative for abdominal distention, abdominal pain, constipation, diarrhea, nausea and vomiting.   Endocrine: Negative for cold intolerance and heat intolerance.   Genitourinary:  Negative for difficulty urinating, dysuria, frequency, hematuria and urgency.   Musculoskeletal:  Negative for arthralgias, back pain and gait problem.   Skin:  Negative for color change, pallor and rash.   Allergic/Immunologic: Negative for  immunocompromised state.   Neurological:  Negative for dizziness, syncope, weakness, light-headedness, numbness and headaches.   Psychiatric/Behavioral:  Positive for confusion (resolved). Negative for agitation and behavioral problems. The patient is not nervous/anxious.      Objective:     Vital Signs (Most Recent):  Temp: 97.5 °F (36.4 °C) (11/23/22 1605)  Pulse: 73 (11/23/22 1605)  Resp: 18 (11/23/22 1605)  BP: (!) 157/81 (11/23/22 1605)  SpO2: (!) 92 % (11/23/22 1605)   Vital Signs (24h Range):  Temp:  [97 °F (36.1 °C)-97.9 °F (36.6 °C)] 97.5 °F (36.4 °C)  Pulse:  [68-90] 73  Resp:  [12-20] 18  SpO2:  [92 %-96 %] 92 %  BP: (123-197)/() 157/81     Weight: 84.8 kg (187 lb)  Body mass index is 27.62 kg/m².    Intake/Output Summary (Last 24 hours) at 11/23/2022 1812  Last data filed at 11/23/2022 1300  Gross per 24 hour   Intake 300 ml   Output 4650 ml   Net -4350 ml      Physical Exam  Vitals and nursing note reviewed.   Constitutional:       General: He is not in acute distress.     Appearance: He is well-developed.   HENT:      Head: Normocephalic and atraumatic.      Comments: Periorbital and perioral facial swelling improved     Mouth/Throat:      Pharynx: No oropharyngeal exudate.   Eyes:      Conjunctiva/sclera: Conjunctivae normal.      Pupils: Pupils are equal, round, and reactive to light.   Cardiovascular:      Rate and Rhythm: Normal rate and regular rhythm.      Heart sounds: Normal heart sounds.      Arteriovenous access: Left arteriovenous access is present.  Pulmonary:      Effort: Pulmonary effort is normal. No respiratory distress.      Breath sounds: No stridor. Rales (improved) present. No wheezing or rhonchi.      Comments: Work of breathing slightly improved  Abdominal:      General: Bowel sounds are normal. There is no distension.      Palpations: Abdomen is soft.      Tenderness: There is no abdominal tenderness.   Musculoskeletal:         General: No tenderness. Normal range of  motion.      Cervical back: Normal range of motion and neck supple.      Right lower le+ Pitting Edema present.      Left lower le+ Pitting Edema present.   Lymphadenopathy:      Cervical: No cervical adenopathy.   Skin:     General: Skin is warm and dry.      Capillary Refill: Capillary refill takes less than 2 seconds.      Findings: No rash.   Neurological:      Mental Status: He is alert and oriented to person, place, and time.      Cranial Nerves: No cranial nerve deficit.      Sensory: No sensory deficit.      Coordination: Coordination normal.   Psychiatric:         Behavior: Behavior normal.         Thought Content: Thought content normal.         Judgment: Judgment normal.       Significant Labs: All pertinent labs within the past 24 hours have been reviewed.    Significant Imaging: I have reviewed all pertinent imaging results/findings within the past 24 hours.      Assessment/Plan:      * Acute HFrEF (heart failure with reduced ejection fraction)  - CHF is currently uncontrolled due to Continued edema of extremities and hypervolemia in the setting of renal failure  - Latest echo 10/12/2022: EF 40%, (+)DD, CVP 15 mmHg, PAP 65mmHg  - Continue Beta Blocker    - home diuretic: n/a may benefit from Lasix    - hospital diuresis: s/p Lasix 100 mg on    - carvediloL (COREG) 3.125 MG tablet BID   - Place on fluid restriction of 1.5 L. Continue to stress to patient importance of self efficacy and  on diet for CHF.   - Monitor clinical status closely. Monitor on telemetry.   - Monitor strict Is&Os and daily weights.    - Last BNP reviewed- and noted below    - Repeat BNP below  - CXR pending to monitor volume status  - Echo with EF of 63%, grade II left ventricular diastolic dysfunction, CVP of 15 mmHg, and moderate-severe pulmonary hypertension.   Recent Labs   Lab 22  0858   BNP 3,024*       ESRD on dialysis  Chronic kidney disease-mineral and bone disorder  MWF schedule  Residual renal  function?- Yes  - Nephrology consulted, appreciate recs   - Recommend repeat CXR   - Recommend ECHO   - Will plan for UF only treatment for additional volume removal if patient remains inpatient    - No lab stick/BP intake on access site   - Continue to monitor intake and output, daily weights    - Please avoid gadolinium, fleets, phos-based laxatives, NSAIDs   - Will follow closely and continue dialysis treatments while in-patient   - Will collect iron studies   - Will hold Epogen due to HTN   - Continue chronic hemodialysis   - Renal diet with protein intake goal 1.5 g/kg/d   - Novasource with meals    - F/U PO4, Mg, Calcium. And albumin levels.    - Phos 1.8. Please hold binders.   - Holding calcium acetate,phosphat bind, (PHOSLO) 667 mg capsule TID per Nephro  - Monitor daily electrolytes and defer dialysis orders to nephrology    Chronic hypoxemic respiratory failure  Patient with Hypoxic Respiratory failure which is Chronic.  he is on home oxygen at 2-3 LPM. Supplemental oxygen was provided and noted. Labs and images were reviewed. Patient Has not had a recent ABG. Will treat underlying causes and adjust management of respiratory failure as follows-   - HD for volume removal; removed 6L so far  - HD today, removed 4350 mL  - fluticasone-salmeterol diskus inhaler 250-50 mcg BID  - tiotropium bromide (SPIRIVA RESPIMAT) 2.5 mcg/actuation inhaler daily  - DuoNebs PRN    Hypertensive urgency  Renovascular Hypertension  Patient has a current diagnosis of hypertensive urgency (without evidence of end organ damage) which is uncontrolled.  Latest blood pressure and vitals reviewed-   Temp:  [97 °F (36.1 °C)-97.9 °F (36.6 °C)]   Pulse:  [68-90]   Resp:  [12-20]   BP: (123-197)/()   SpO2:  [92 %-96 %] .   - Patient currently off IV antihypertensives.   - Home meds for hypertension were reviewed.   - Medication adjustment for hospital antihypertensives is as follows:   - Nifedipine to 90 mg qD   - Carvedilol 6.25 mg  BID   - Clonidine patch every Monday   - Imdur 30 mg BID   - resumed Hydralazine 100 mg TID  - Will continue to monitor vital signs.   - Will aim for controlled BP reduction by medications noted above. Monitor and mitigate end organ damage as indicated.    Agitation  - agitated and confused overnight on 11/19   - improved  - s/p oral depakote x1 with improvement   - continue to monitor    HLD (hyperlipidemia)  - Continue statin    Anemia in ESRD (end-stage renal disease)  - Current CBC reviewed-   Lab Results   Component Value Date    HGB 9.3 (L) 11/23/2022    HCT 29.9 (L) 11/23/2022     - Patient's anemia is currently controlled.   - Etiology likely d/t anemia of chronic disease, iron deficiency   - Monitor serial CBC and transfuse if patient becomes hemodynamically unstable, symptomatic or H/H drops below 7/21.     Type 1 diabetes mellitus with chronic kidney disease on chronic dialysis  Patient's FSGs are uncontrolled due to hyperglycemia on current medication regimen.  Last A1c reviewed-   Lab Results   Component Value Date    HGBA1C 11.9 (H) 10/12/2022     Most recent fingerstick glucose reviewed-   Recent Labs   Lab 11/22/22  2053 11/23/22  1228 11/23/22  1604   POCTGLUCOSE 249* 198* 212*     Current correctional scale  Medium  Maintain anti-hyperglycemic dose as follows-   Antihyperglycemics (From admission, onward)    Start     Stop Route Frequency Ordered    11/24/22 0715  insulin aspart U-100 pen 6 Units         -- SubQ 3 times daily with meals 11/23/22 1828    11/23/22 2100  insulin detemir U-100 pen 8 Units         -- SubQ 2 times daily 11/23/22 1828    11/20/22 1820  insulin aspart U-100 pen 0-5 Units         -- SubQ Before meals & nightly PRN 11/20/22 1721        - Holding oral hypoglycemics during admission  - Increased insulin regimen as shown above    Cellulitis of left lower extremity  - chronic changes to bilateral lower extremities  - no active infection observed on exam  - Lower extremity edema  L>R  - US BLE negative for DVT      VTE Risk Mitigation (From admission, onward)         Ordered     apixaban tablet 5 mg  2 times daily         11/23/22 1133     IP VTE HIGH RISK PATIENT  Once         11/20/22 1721     Place sequential compression device  Until discontinued         11/20/22 1721                Discharge Planning   GERA: 11/24/2022     Code Status: Full Code   Is the patient medically ready for discharge?: No    Reason for patient still in hospital (select all that apply): Patient trending condition, Treatment and PT / OT recommendations  Discharge Plan A: Return to nursing home                  Landon Darby PA-C  Department of Hospital Medicine   [facility]

## 2022-11-24 NOTE — ASSESSMENT & PLAN NOTE
Patient with Hypoxic Respiratory failure which is Chronic.  he is on home oxygen at 2-3 LPM. Supplemental oxygen was provided and noted. Labs and images were reviewed. Patient Has not had a recent ABG. Will treat underlying causes and adjust management of respiratory failure as follows-   - HD for volume removal; removed 6L so far  - HD today, removed 4350 mL  - fluticasone-salmeterol diskus inhaler 250-50 mcg BID  - tiotropium bromide (SPIRIVA RESPIMAT) 2.5 mcg/actuation inhaler daily  - DuoNebs PRN

## 2022-11-24 NOTE — PROGRESS NOTES
3 hour UF only complete.  Blood returned.  Needles pulled.  Pressure held x 10 minutes.  Hemostasis achieved.  Covered with gauze and paper tape.  +thrill +bruit.      Net UF 3L.  Tolerated well.    Transported from [facility] to room 1110 via stretcher by transporter.

## 2022-11-24 NOTE — SUBJECTIVE & OBJECTIVE
Interval History: NAEON. Requiring 5 L NC to maintain O2 saturations. Hypertensive but improved from earlier in admission.   Evaluated at bedside, no complaints at this time. Still appears clinically volume overloaded, facial swelling non-improved from previous. No chest pain, abdominal pain, nausea or vomiting. All questions answered at this time.    Review of Systems   Constitutional:  Negative for activity change, chills and fever.   HENT:  Positive for facial swelling (periorbital, perioral - improved). Negative for congestion, drooling and trouble swallowing.    Eyes:  Negative for photophobia and visual disturbance.   Respiratory:  Positive for shortness of breath. Negative for cough, chest tightness and wheezing.         Orthopnea    Cardiovascular:  Positive for leg swelling. Negative for chest pain and palpitations.   Gastrointestinal:  Negative for abdominal distention, abdominal pain, constipation, diarrhea, nausea and vomiting.   Endocrine: Negative for cold intolerance and heat intolerance.   Genitourinary:  Negative for difficulty urinating, dysuria, frequency, hematuria and urgency.   Musculoskeletal:  Negative for arthralgias, back pain and gait problem.   Skin:  Negative for color change, pallor and rash.   Allergic/Immunologic: Negative for immunocompromised state.   Neurological:  Negative for dizziness, syncope, weakness, light-headedness, numbness and headaches.   Psychiatric/Behavioral:  Positive for confusion (resolved). Negative for agitation and behavioral problems. The patient is not nervous/anxious.    Objective:     Vital Signs (Most Recent):  Temp: 98 °F (36.7 °C) (11/24/22 1312)  Pulse: 68 (11/24/22 1549)  Resp: 18 (11/24/22 0939)  BP: (!) 170/99 (11/24/22 1312)  SpO2: (!) 93 % (11/24/22 0753)   Vital Signs (24h Range):  Temp:  [97.4 °F (36.3 °C)-98.8 °F (37.1 °C)] 98 °F (36.7 °C)  Pulse:  [67-79] 68  Resp:  [18] 18  SpO2:  [93 %-95 %] 93 %  BP: (132-192)/() 170/99     Weight:  84.8 kg (187 lb)  Body mass index is 27.62 kg/m².    Intake/Output Summary (Last 24 hours) at 2022 1720  Last data filed at 2022 1312  Gross per 24 hour   Intake 1140 ml   Output 3600 ml   Net -2460 ml      Physical Exam  Vitals and nursing note reviewed.   Constitutional:       General: He is not in acute distress.     Appearance: He is well-developed.   HENT:      Head: Normocephalic and atraumatic.      Comments: Periorbital and perioral facial swelling improved     Mouth/Throat:      Pharynx: No oropharyngeal exudate.   Eyes:      Conjunctiva/sclera: Conjunctivae normal.      Pupils: Pupils are equal, round, and reactive to light.   Cardiovascular:      Rate and Rhythm: Normal rate and regular rhythm.      Heart sounds: Normal heart sounds.      Arteriovenous access: Left arteriovenous access is present.  Pulmonary:      Effort: Pulmonary effort is normal. No respiratory distress.      Breath sounds: No stridor. No wheezing or rhonchi.      Comments: Work of breathing slightly improved  Abdominal:      General: Bowel sounds are normal. There is no distension.      Palpations: Abdomen is soft.      Tenderness: There is no abdominal tenderness.   Musculoskeletal:         General: No tenderness. Normal range of motion.      Cervical back: Normal range of motion and neck supple.      Right lower le+ Pitting Edema present.      Left lower le+ Pitting Edema present.   Lymphadenopathy:      Cervical: No cervical adenopathy.   Skin:     General: Skin is warm and dry.      Capillary Refill: Capillary refill takes less than 2 seconds.      Findings: No rash.   Neurological:      Mental Status: He is alert and oriented to person, place, and time.      Cranial Nerves: No cranial nerve deficit.      Sensory: No sensory deficit.      Coordination: Coordination normal.   Psychiatric:         Behavior: Behavior normal.         Thought Content: Thought content normal.         Judgment: Judgment normal.        Significant Labs: All pertinent labs within the past 24 hours have been reviewed.  CBC:   Recent Labs   Lab 11/23/22  0858   WBC 4.72   HGB 9.3*   HCT 29.9*        CMP:   Recent Labs   Lab 11/23/22  0858      K 4.4      CO2 23   *   BUN 38*   CREATININE 5.0*   CALCIUM 8.2*   PROT 6.4   ALBUMIN 2.6*   BILITOT 0.7   ALKPHOS 258*   AST 35   ALT 36   ANIONGAP 9     Cardiac Markers:   Recent Labs   Lab 11/23/22  0858   BNP 3,024*       Significant Imaging: I have reviewed all pertinent imaging results/findings within the past 24 hours.    Imaging Results              X-Ray Chest AP Portable (Final result)  Result time 11/18/22 14:07:39      Final result by Nazario Fernandes MD (11/18/22 14:07:39)                   Impression:      Findings suggesting pulmonary edema/CHF pattern and trace bilateral pleural effusions, to a lesser degree from prior.  Other nonspecific infectious/inflammatory pneumonitis could present similarly.      Electronically signed by: Nazario Fernandes MD  Date:    11/18/2022  Time:    14:07               Narrative:    EXAMINATION:  XR CHEST AP PORTABLE    CLINICAL HISTORY:  Shortness of breath    TECHNIQUE:  Single frontal view of the chest was performed.    COMPARISON:  Chest radiograph and CT thorax 10/12/2022    FINDINGS:  Monitoring leads overlie the chest.  Patient is slightly rotated.    Cardiomediastinal silhouette is midline and prominent similar to prior.  There is continued prominence of the central pulmonary vasculature with bilateral diffuse nonspecific interstitial coarsening and patchy ground-glass opacities suggesting pulmonary edema/CHF pattern, slightly lesser degree from prior.  Suspected trace bilateral pleural effusions, decreased in volume from prior.  Scattered bandlike opacities at the lung bases consistent with platelike scarring versus atelectasis.  No large consolidation or pneumothorax.  No acute osseous process seen.  PA and lateral views can be  obtained.

## 2022-11-24 NOTE — ASSESSMENT & PLAN NOTE
- CHF is currently uncontrolled due to Continued edema of extremities and hypervolemia in the setting of renal failure  - Latest echo 10/12/2022: EF 40%, (+)DD, CVP 15 mmHg, PAP 65mmHg  - Continue Beta Blocker    - home diuretic: n/a may benefit from Lasix    - hospital diuresis: s/p Lasix 100 mg on 11/19   - carvediloL (COREG) 3.125 MG tablet BID   - Place on fluid restriction of 1.5 L. Continue to stress to patient importance of self efficacy and  on diet for CHF.   - Monitor clinical status closely. Monitor on telemetry.   - Monitor strict Is&Os and daily weights.    - Last BNP reviewed- and noted below    - Repeat BNP below  - CXR pending to monitor volume status  - Echo with EF of 63%, grade II left ventricular diastolic dysfunction, CVP of 15 mmHg, and moderate-severe pulmonary hypertension.   Recent Labs   Lab 11/23/22  0858   BNP 3,024*

## 2022-11-24 NOTE — ASSESSMENT & PLAN NOTE
- Current CBC reviewed-   Lab Results   Component Value Date    HGB 9.3 (L) 11/23/2022    HCT 29.9 (L) 11/23/2022     - Patient's anemia is currently controlled.   - Etiology likely d/t anemia of chronic disease, iron deficiency   - Monitor serial CBC and transfuse if patient becomes hemodynamically unstable, symptomatic or H/H drops below 7/21.

## 2022-11-24 NOTE — ASSESSMENT & PLAN NOTE
Patient's FSGs are uncontrolled due to hyperglycemia on current medication regimen.  Last A1c reviewed-   Lab Results   Component Value Date    HGBA1C 11.9 (H) 10/12/2022     Most recent fingerstick glucose reviewed-   Recent Labs   Lab 11/23/22  1949 11/24/22  0754 11/24/22  1242 11/24/22  1635   POCTGLUCOSE 276* 292* 130* 250*     Current correctional scale  Medium  Maintain anti-hyperglycemic dose as follows-   Antihyperglycemics (From admission, onward)    Start     Stop Route Frequency Ordered    11/24/22 0715  insulin aspart U-100 pen 6 Units         -- SubQ 3 times daily with meals 11/23/22 1828    11/23/22 2100  insulin detemir U-100 pen 8 Units         -- SubQ 2 times daily 11/23/22 1828    11/20/22 1820  insulin aspart U-100 pen 0-5 Units         -- SubQ Before meals & nightly PRN 11/20/22 1721        - Holding oral hypoglycemics during admission  - Increased insulin regimen as shown above

## 2022-11-24 NOTE — ASSESSMENT & PLAN NOTE
Recent Labs   Lab 11/19/22  1357 11/21/22  0423 11/23/22  0858   WBC 5.17 6.25 4.72   HGB 8.8* 9.1* 9.3*   HCT 29.2* 30.0* 29.9*    238 241       - Target Hgb 10-11 gm/dL.  - Will request iron studies in AM to assess further needs of supplementation   - Will review chronic care plan from outpatient dialysis center for MARCIA dosing.

## 2022-11-24 NOTE — PLAN OF CARE
Sw asked by PAC Alissa if Pt is able to return to [facility]. Sw reviewed notes, Pt ok to return when stable, Pt needs HD session prior to dc per PAC and Sw will follow with room and transport when able to dc.

## 2022-11-24 NOTE — ASSESSMENT & PLAN NOTE
Renovascular Hypertension  Patient has a current diagnosis of hypertensive urgency (without evidence of end organ damage) which is uncontrolled.  Latest blood pressure and vitals reviewed-   Temp:  [97 °F (36.1 °C)-97.9 °F (36.6 °C)]   Pulse:  [68-90]   Resp:  [12-20]   BP: (123-197)/()   SpO2:  [92 %-96 %] .   - Patient currently off IV antihypertensives.   - Home meds for hypertension were reviewed.   - Medication adjustment for hospital antihypertensives is as follows:   - Nifedipine to 90 mg qD   - Carvedilol 6.25 mg BID   - Clonidine patch every Monday   - Imdur 30 mg BID   - resumed Hydralazine 100 mg TID  - Will continue to monitor vital signs.   - Will aim for controlled BP reduction by medications noted above. Monitor and mitigate end organ damage as indicated.

## 2022-11-24 NOTE — ASSESSMENT & PLAN NOTE
Patient's FSGs are uncontrolled due to hyperglycemia on current medication regimen.  Last A1c reviewed-   Lab Results   Component Value Date    HGBA1C 11.9 (H) 10/12/2022     Most recent fingerstick glucose reviewed-   Recent Labs   Lab 11/22/22 2053 11/23/22  1228 11/23/22  1604   POCTGLUCOSE 249* 198* 212*     Current correctional scale  Medium  Maintain anti-hyperglycemic dose as follows-   Antihyperglycemics (From admission, onward)    Start     Stop Route Frequency Ordered    11/24/22 0715  insulin aspart U-100 pen 6 Units         -- SubQ 3 times daily with meals 11/23/22 1828    11/23/22 2100  insulin detemir U-100 pen 8 Units         -- SubQ 2 times daily 11/23/22 1828    11/20/22 1820  insulin aspart U-100 pen 0-5 Units         -- SubQ Before meals & nightly PRN 11/20/22 1721        - Holding oral hypoglycemics during admission  - Increased insulin regimen as shown above

## 2022-11-24 NOTE — ASSESSMENT & PLAN NOTE
Renovascular Hypertension  Patient has a current diagnosis of hypertensive urgency (without evidence of end organ damage) which is uncontrolled.  Latest blood pressure and vitals reviewed-   Temp:  [97.4 °F (36.3 °C)-98.8 °F (37.1 °C)]   Pulse:  [67-79]   Resp:  [18]   BP: (132-192)/()   SpO2:  [93 %-95 %] .   - Patient currently off IV antihypertensives.   - Home meds for hypertension were reviewed.   - Medication adjustment for hospital antihypertensives is as follows:   - Nifedipine to 90 mg qD   - Carvedilol 6.25 mg BID   - Clonidine patch every Monday   - Imdur 30 mg BID   - resumed Hydralazine 100 mg TID  - Will continue to monitor vital signs.   - Will aim for controlled BP reduction by medications noted above. Monitor and mitigate end organ damage as indicated.

## 2022-11-24 NOTE — PLAN OF CARE
Problem: Device-Related Complication Risk (Hemodialysis)  Goal: Safe, Effective Therapy Delivery  Outcome: Ongoing, Progressing     Problem: Hemodynamic Instability (Hemodialysis)  Goal: Effective Tissue Perfusion  Outcome: Ongoing, Progressing     Problem: Infection (Hemodialysis)  Goal: Absence of Infection Signs and Symptoms  Outcome: Ongoing, Progressing     Problem: Adult Inpatient Plan of Care  Goal: Plan of Care Review  Outcome: Ongoing, Progressing  Goal: Patient-Specific Goal (Individualized)  Outcome: Ongoing, Progressing  Goal: Absence of Hospital-Acquired Illness or Injury  Outcome: Ongoing, Progressing  Goal: Optimal Comfort and Wellbeing  Outcome: Ongoing, Progressing  Goal: Readiness for Transition of Care  Outcome: Ongoing, Progressing     Problem: Diabetes Comorbidity  Goal: Blood Glucose Level Within Targeted Range  Outcome: Ongoing, Progressing     Problem: Electrolyte Imbalance (Chronic Kidney Disease)  Goal: Electrolyte Balance  Outcome: Ongoing, Progressing     Problem: Fluid Volume Excess (Chronic Kidney Disease)  Goal: Fluid Balance  Outcome: Ongoing, Progressing     Problem: Skin Injury Risk Increased  Goal: Skin Health and Integrity  Outcome: Ongoing, Progressing     Problem: Infection  Goal: Absence of Infection Signs and Symptoms  Outcome: Ongoing, Progressing

## 2022-11-24 NOTE — ASSESSMENT & PLAN NOTE
Chronic kidney disease-mineral and bone disorder  MWF schedule  Residual renal function?- Yes  - Nephrology consulted, appreciate recs   - Recommend repeat CXR   - Recommend ECHO   - Will plan for UF only treatment for additional volume removal if patient remains inpatient    - No lab stick/BP intake on access site   - Continue to monitor intake and output, daily weights    - Please avoid gadolinium, fleets, phos-based laxatives, NSAIDs   - Will follow closely and continue dialysis treatments while in-patient   - Will collect iron studies   - Will hold Epogen due to HTN   - Continue chronic hemodialysis   - Renal diet with protein intake goal 1.5 g/kg/d   - Novasource with meals    - F/U PO4, Mg, Calcium. And albumin levels.    - Phos 1.8. Please hold binders.   - Holding calcium acetate,phosphat bind, (PHOSLO) 667 mg capsule TID per Nephro  - Monitor daily electrolytes and defer dialysis orders to nephrology

## 2022-11-24 NOTE — ASSESSMENT & PLAN NOTE
Outpatient HD Information:    -Outpatient HD unit: [facility]  -HD tx days: MWF  -HD tx time: 240mins  -Last HD tx prior to presentation: 11/16/22  -HD access: LUE AVF  -HD modality: iHD   -Residual urine: Anuric   -EDW: 79kg     Plan/Recommendations:    -HD with UF only goal 3L volume removal.   -Strict I/O's and daily weights  -Daily renal function panels   -Renally dose meds

## 2022-11-24 NOTE — PROGRESS NOTES
[facility]  Nephrology  Progress Note    Patient Name: Cosme Lewis  MRN: [id number]  Admission Date: 11/18/2022  Hospital Length of Stay: 4 days  Attending Provider: Jose Daly MD   Primary Care Physician: Primary Doctor No  Principal Problem:Acute HFrEF (heart failure with reduced ejection fraction)    Subjective:     HPI: Cosme Lewis is a 57 y.o. male with T1DM, ESRD on HD (MWF), HTN, HFpEF, HLD, chronic hypoxemic resp failure 2/2 COPD (on home O2), h/o DVT/PE on Eliquis transported by EMS from [facility] for shortness of breath. Patient presented to dialysis today, but was sent to the ED for evaluation due to SOB and abdominal distention. Patient last dialyzed on 11/16/22. Anuric at baseline. Patient endorses SOB that has been present for 2 days that has gotten progressively worse, as well as associated cough. He denies CP, fever, aches, chills, N/V/D, and abdominal pain. Nephrology was consulted for management of ESRD/HD.       Interval History:     Underwent HD yesterday with ~4L UF removed. This AM patient is still SOB with 5L nasal cannula requirements. No improvement in lower extremity edema, has JVD. Scheduled to be discharged to nursing home today. Plan for UF session of 3L prior to transfer to nursing home.     Review of patient's allergies indicates:  No Known Allergies  Current Facility-Administered Medications   Medication Frequency    acetaminophen tablet 650 mg Q8H PRN    acetaminophen tablet 650 mg Q4H PRN    albuterol-ipratropium 2.5 mg-0.5 mg/3 mL nebulizer solution 3 mL Q6H PRN    aluminum-magnesium hydroxide-simethicone 200-200-20 mg/5 mL suspension 30 mL QID PRN    apixaban tablet 5 mg BID    aspirin EC tablet 81 mg Daily    atorvastatin tablet 40 mg Daily    carvediloL tablet 6.25 mg BID    cloNIDine 0.3 mg/24 hr td ptwk 1 patch Every Mon    dextrose 10% bolus 125 mL PRN    dextrose 10% bolus 250 mL PRN    diphenhydrAMINE capsule 25 mg Once    fluticasone  furoate-vilanteroL 100-25 mcg/dose diskus inhaler 1 puff Daily    glucagon (human recombinant) injection 1 mg PRN    glucose chewable tablet 16 g PRN    glucose chewable tablet 24 g PRN    hydrALAZINE tablet 100 mg TID    insulin aspart U-100 pen 0-5 Units QID (AC + HS) PRN    insulin aspart U-100 pen 6 Units TIDWM    insulin detemir U-100 pen 8 Units BID    isosorbide mononitrate 24 hr tablet 30 mg BID    LIDOcaine HCl 2% urojet PRN    melatonin tablet 6 mg Nightly PRN    mupirocin 2 % ointment BID    naloxone 0.4 mg/mL injection 0.02 mg PRN    NIFEdipine 24 hr tablet 90 mg Daily    ondansetron disintegrating tablet 8 mg Q8H PRN    polyethylene glycol packet 17 g Daily    senna tablet 8.6 mg BID    sodium chloride 0.9% flush 10 mL Q12H PRN    tiotropium bromide 2.5 mcg/actuation inhaler 2 puff Daily       Objective:     Vital Signs (Most Recent):  Temp: 97.6 °F (36.4 °C) (11/24/22 0939)  Pulse: 70 (11/24/22 1000)  Resp: 18 (11/24/22 0939)  BP: (!) 155/88 (11/24/22 1000)  SpO2: (!) 93 % (11/24/22 0753)  O2 Device (Oxygen Therapy): nasal cannula (11/24/22 0939)   Vital Signs (24h Range):  Temp:  [97.4 °F (36.3 °C)-98.8 °F (37.1 °C)] 97.6 °F (36.4 °C)  Pulse:  [67-90] 70  Resp:  [18] 18  SpO2:  [92 %-95 %] 93 %  BP: (132-190)/() 155/88     Weight: 84.8 kg (187 lb) (11/23/22 1300)  Body mass index is 27.62 kg/m².  Body surface area is 2.03 meters squared.    I/O last 3 completed shifts:  In: 840 [P.O.:540; Other:300]  Out: 4650 [Other:4650]    Physical Exam  Vitals and nursing note reviewed.   Constitutional:       Appearance: He is not ill-appearing.      Interventions: Nasal cannula in place.   HENT:      Head: Normocephalic and atraumatic.      Nose: Nose normal.      Mouth/Throat:      Mouth: Mucous membranes are moist.      Pharynx: Oropharynx is clear.   Eyes:      Conjunctiva/sclera: Conjunctivae normal.   Neck:      Comments: Positive JVD  Cardiovascular:      Rate and Rhythm: Normal rate.      Comments:  LUE AVF with +thrill and +bruit   Pulmonary:      Breath sounds: Rales present.   Abdominal:      General: There is distension.      Tenderness: There is no abdominal tenderness.   Musculoskeletal:      Cervical back: Normal range of motion and neck supple.      Right lower leg: Edema present.      Left lower leg: Edema present.   Skin:     General: Skin is warm and dry.   Neurological:      General: No focal deficit present.      Mental Status: He is alert and oriented to person, place, and time.   Psychiatric:         Mood and Affect: Mood normal.         Behavior: Behavior normal.       Significant Labs:  All labs within the past 24 hours have been reviewed.     Significant Imaging:  Labs: Reviewed    Assessment/Plan:     * Acute HFrEF (heart failure with reduced ejection fraction)  - defer to primary team    Anemia in ESRD (end-stage renal disease)  Recent Labs   Lab 11/19/22  1357 11/21/22  0423 11/23/22  0858   WBC 5.17 6.25 4.72   HGB 8.8* 9.1* 9.3*   HCT 29.2* 30.0* 29.9*    238 241       - Target Hgb 10-11 gm/dL.  - Will request iron studies in AM to assess further needs of supplementation   - Will review chronic care plan from outpatient dialysis center for MARCIA dosing.     Chronic kidney disease-mineral and bone disorder  -Low phos diet     ESRD on dialysis  Outpatient HD Information:    -Outpatient HD unit: [facility]  -HD tx days: MWF  -HD tx time: 240mins  -Last HD tx prior to presentation: 11/16/22  -HD access: LUE AVF  -HD modality: iHD   -Residual urine: Anuric   -EDW: 79kg     Plan/Recommendations:    -HD with UF only goal 3L volume removal.   -Strict I/O's and daily weights  -Daily renal function panels   -Renally dose meds          Thank you for your consult. I will follow-up with patient. Please contact us if you have any additional questions.     Case discussed with attending. Attestation to follow.       Darinel Dawkins DO  Nephrology  [facility]    ATTENDING PHYSICIAN  ATTESTATION  I have personally verified the history and examined the patient. I thoroughly reviewed the demographic, clinical, laboratorial and imaging information available in medical records. I agree with the assessment and recommendations provided by the subspecialty resident who was under my supervision.

## 2022-11-25 NOTE — ASSESSMENT & PLAN NOTE
Patient's FSGs are uncontrolled due to hyperglycemia on current medication regimen.  Last A1c reviewed-   Lab Results   Component Value Date    HGBA1C 11.9 (H) 10/12/2022     Most recent fingerstick glucose reviewed-   Recent Labs   Lab 11/24/22 2022 11/25/22  0757 11/25/22  1112 11/25/22  1638   POCTGLUCOSE 300* 197* 161* 129*     Current correctional scale  Medium  Maintain anti-hyperglycemic dose as follows-   Antihyperglycemics (From admission, onward)    Start     Stop Route Frequency Ordered    11/24/22 0715  insulin aspart U-100 pen 6 Units         -- SubQ 3 times daily with meals 11/23/22 1828    11/23/22 2100  insulin detemir U-100 pen 8 Units         -- SubQ 2 times daily 11/23/22 1828    11/20/22 1820  insulin aspart U-100 pen 0-5 Units         -- SubQ Before meals & nightly PRN 11/20/22 1721        - Holding oral hypoglycemics during admission  - Increased insulin regimen as shown above

## 2022-11-25 NOTE — ASSESSMENT & PLAN NOTE
Chronic kidney disease-mineral and bone disorder  MWF schedule  Residual renal function?- Yes  - Nephrology consulted, appreciate recs   - Recommend repeat CXR    - Diffuse accentuation interstitial markings possibly infiltrates and/or interstitial edema mostly at the lung bases.  Small amount of fluid in the right interlobar fissure and slightly blunted costophrenic angles noted posteriorly   - Recommend ECHO, see full report below    - Will plan for UF only treatment for additional volume removal if patient remains inpatient    - No lab stick/BP intake on access site   - Continue to monitor intake and output, daily weights    - Please avoid gadolinium, fleets, phos-based laxatives, NSAIDs   - Will follow closely and continue dialysis treatments while in-patient   - Will collect iron studies   - Will hold Epogen due to HTN   - Continue chronic hemodialysis   - Renal diet with protein intake goal 1.5 g/kg/d   - Novasource with meals    - F/U PO4, Mg, Calcium. And albumin levels.    - Phos 1.8. Please hold binders.   - Holding calcium acetate,phosphat bind, (PHOSLO) 667 mg capsule TID per Nephro  - Monitor daily electrolytes and defer dialysis orders to nephrology    Results for orders placed during the hospital encounter of 11/18/22    Echo    Interpretation Summary  · The left ventricle is normal in size with mild concentric hypertrophy and increased density and normal systolic function.  · The estimated ejection fraction is 63%.  · Grade II left ventricular diastolic dysfunction.  · Severe left atrial enlargement.  · Mild right ventricular enlargement with low normal right ventricular systolic function.  · There is right ventricular hypertrophy.  · Moderate right atrial enlargement.  · Mild to moderate tricuspid regurgitation.  · Mild pulmonic regurgitation.  · Elevated central venous pressure (15 mmHg).  · The estimated PA systolic pressure is 67 mmHg.  · There is moderate-severe pulmonary hypertension.  ·  Trivial posterolateral pericardial effusion.  · There is a moderate left pleural effusion.  · There may be some ascites present.

## 2022-11-25 NOTE — ASSESSMENT & PLAN NOTE
- CHF is currently uncontrolled due to Continued edema of extremities and hypervolemia in the setting of renal failure  - Latest echo 10/12/2022: EF 40%, (+)DD, CVP 15 mmHg, PAP 65mmHg  - Continue Beta Blocker    - home diuretic: n/a may benefit from Lasix    - hospital diuresis: s/p Lasix 100 mg on 11/19   - carvediloL (COREG) 3.125 MG tablet BID   - Place on fluid restriction of 1.5 L. Continue to stress to patient importance of self efficacy and  on diet for CHF.   - Monitor clinical status closely. Monitor on telemetry.   - Monitor strict Is&Os and daily weights.    - Last BNP reviewed- and noted below    - Repeat BNP below  - CXR showing diffuse accentuation interstitial markings possibly infiltrates and/or interstitial edema mostly at the lung bases.  Small amount of fluid in the right interlobar fissure and slightly blunted costophrenic angles noted posteriorly  - Echo with EF of 63%, grade II left ventricular diastolic dysfunction, CVP of 15 mmHg, and moderate-severe pulmonary hypertension.   Recent Labs   Lab 11/23/22  0858   BNP 3,024*

## 2022-11-25 NOTE — ASSESSMENT & PLAN NOTE
Renovascular Hypertension  Patient has a current diagnosis of hypertensive urgency (without evidence of end organ damage) which is uncontrolled.  Latest blood pressure and vitals reviewed-   Temp:  [97.3 °F (36.3 °C)-98.6 °F (37 °C)]   Pulse:  [70-87]   Resp:  [16-18]   BP: (141-184)/(75-88)   SpO2:  [85 %-98 %] .   - Patient currently off IV antihypertensives.   - Home meds for hypertension were reviewed.   - Medication adjustment for hospital antihypertensives is as follows:   - Nifedipine to 90 mg qD   - Carvedilol 6.25 mg BID   - Clonidine patch every Monday   - Imdur 30 mg BID   - resumed Hydralazine 100 mg TID  - Will continue to monitor vital signs.   - Will aim for controlled BP reduction by medications noted above. Monitor and mitigate end organ damage as indicated.

## 2022-11-25 NOTE — PROGRESS NOTES
[facility]  Nephrology  Progress Note    Patient Name: Cosme Lewis  MRN: [id number]  Admission Date: 11/18/2022  Hospital Length of Stay: 5 days  Attending Provider: Jose Daly MD   Primary Care Physician: Primary Doctor No  Principal Problem:Acute HFrEF (heart failure with reduced ejection fraction)    Subjective:       Interval History: Continues on 5L NC. Pt refusing dialysis. States he is going home and does not want dialysis. UF only tx yesterday 3L, tolerated well    Review of patient's allergies indicates:  No Known Allergies  Current Facility-Administered Medications   Medication Frequency    0.9%  NaCl infusion Once    acetaminophen tablet 650 mg Q8H PRN    acetaminophen tablet 650 mg Q4H PRN    albuterol-ipratropium 2.5 mg-0.5 mg/3 mL nebulizer solution 3 mL Q6H PRN    aluminum-magnesium hydroxide-simethicone 200-200-20 mg/5 mL suspension 30 mL QID PRN    apixaban tablet 5 mg BID    aspirin EC tablet 81 mg Daily    atorvastatin tablet 40 mg Daily    carvediloL tablet 6.25 mg BID    cloNIDine 0.3 mg/24 hr td ptwk 1 patch Every Mon    dextrose 10% bolus 125 mL PRN    dextrose 10% bolus 250 mL PRN    diphenhydrAMINE capsule 25 mg Once    fluticasone furoate-vilanteroL 100-25 mcg/dose diskus inhaler 1 puff Daily    glucagon (human recombinant) injection 1 mg PRN    glucose chewable tablet 16 g PRN    glucose chewable tablet 24 g PRN    hydrALAZINE tablet 100 mg TID    insulin aspart U-100 pen 0-5 Units QID (AC + HS) PRN    insulin aspart U-100 pen 6 Units TIDWM    insulin detemir U-100 pen 8 Units BID    isosorbide mononitrate 24 hr tablet 30 mg BID    LIDOcaine HCl 2% urojet PRN    melatonin tablet 6 mg Nightly PRN    mupirocin 2 % ointment BID    naloxone 0.4 mg/mL injection 0.02 mg PRN    NIFEdipine 24 hr tablet 90 mg Daily    ondansetron disintegrating tablet 8 mg Q8H PRN    polyethylene glycol packet 17 g Daily    senna tablet 8.6 mg BID    sodium chloride  0.9% flush 10 mL Q12H PRN    tiotropium bromide 2.5 mcg/actuation inhaler 2 puff Daily       Objective:     Vital Signs (Most Recent):  Temp: 97.3 °F (36.3 °C) (11/25/22 1122)  Pulse: 72 (11/25/22 1122)  Resp: 16 (11/25/22 1122)  BP: (!) 184/88 (11/25/22 1122)  SpO2: 96 % (11/25/22 1122)  O2 Device (Oxygen Therapy): nasal cannula (11/25/22 0757)   Vital Signs (24h Range):  Temp:  [97.3 °F (36.3 °C)-98.6 °F (37 °C)] 97.3 °F (36.3 °C)  Pulse:  [68-87] 72  Resp:  [16-18] 16  SpO2:  [85 %-98 %] 96 %  BP: (141-192)/() 184/88     Weight: 84.8 kg (187 lb) (11/23/22 1300)  Body mass index is 27.62 kg/m².  Body surface area is 2.03 meters squared.    I/O last 3 completed shifts:  In: 1140 [P.O.:540; Other:600]  Out: 3600 [Other:3600]    Physical Exam  Vitals and nursing note reviewed.   Constitutional:       Appearance: He is not ill-appearing.      Interventions: Nasal cannula in place.   HENT:      Head: Normocephalic and atraumatic.      Nose: Nose normal.      Mouth/Throat:      Mouth: Mucous membranes are moist.      Pharynx: Oropharynx is clear.   Eyes:      Conjunctiva/sclera: Conjunctivae normal.   Neck:      Comments: Positive JVD  Cardiovascular:      Rate and Rhythm: Normal rate.      Comments: LUE AVF with +thrill and +bruit   Pulmonary:      Breath sounds: Rales present.   Abdominal:      General: There is distension.      Tenderness: There is no abdominal tenderness.   Musculoskeletal:      Cervical back: Normal range of motion and neck supple.      Right lower leg: Edema present.      Left lower leg: Edema present.   Skin:     General: Skin is warm and dry.   Neurological:      General: No focal deficit present.      Mental Status: He is alert and oriented to person, place, and time.   Psychiatric:         Mood and Affect: Mood normal.         Behavior: Behavior normal.       Significant Labs:  CBC:   Recent Labs   Lab 11/25/22  0226   WBC 5.43   RBC 3.09*   HGB 8.9*   HCT 28.1*      MCV 91   MCH  28.8   MCHC 31.7*     CMP:   Recent Labs   Lab 11/25/22  0226   *   CALCIUM 8.0*   ALBUMIN 2.6*   PROT 6.3   *   K 4.5   CO2 20*      BUN 36*   CREATININE 4.6*   ALKPHOS 250*   ALT 28   AST 30   BILITOT 0.6     All labs within the past 24 hours have been reviewed.         Assessment/Plan:     * Acute HFrEF (heart failure with reduced ejection fraction)  - defer to primary team    Anemia in ESRD (end-stage renal disease)  Target 10-11    Chronic kidney disease-mineral and bone disorder  -Low phos diet     ESRD on dialysis  Outpatient HD Information:    -Outpatient HD unit: [facility]  -HD tx days: MWF  -HD tx time: 240mins  -Last HD tx prior to presentation: 11/16/22  -HD access: LUE AVF  -HD modality: iHD   -Residual urine: Anuric   -EDW: 79kg     Plan/Recommendations:    -Plan for HD today to keep outpatient schedule, however pt refusing   -Strict I/O's and daily weights  -Daily renal function panels   -Renally dose meds          Thank you for your consult. I will follow-up with patient. Please contact us if you have any additional questions.    Citlalli Simpson DNP  Nephrology  [facility]

## 2022-11-25 NOTE — PROGRESS NOTES
[facility] - [facility]ation [address] [place] [address] Medicine  Progress Note    Patient Name: Cosme Lewis  MRN: [id number]  Patient Class: IP- Inpatient   Admission Date: 11/18/2022  Length of Stay: 5 days  Attending Physician: Jose Daly MD  Primary Care Provider: Primary Doctor No        Subjective:     Principal Problem:Acute HFrEF (heart failure with reduced ejection fraction)        HPI:  Cosme Lewis is a 57 y.o. 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 Per ED MD, the facility believed he appeared more overloaded than normal and therefore sent him to the ED. The patient reports he was at his mother's home before being picked up by transportation. He states he passed out in while in transport and never arrived at the facility, he does not seem like the most reliable historian. He reports using supplemental oxygen at baseline, unsure of how many liters. He denies chest pain, worsening shortness of breath, diarrhea, abdominal pain, cough or sore throat. He has no awareness of his medications. He states he lives in a facility for people who are 55 and older. He denies tobacco and alcohol use.     ED: afebrile without leukocytosis.nephrology was consulted for HD. VSS. BNP 2,255. Troponin 0.035. admitted to hospital medicine for further management.        Overview/Hospital Course:  Cosme Lewis was admitted to hospital medicine for management of volume overload in the setting of missed HD. Nephro consulted, taken for HD with 3L fluid removal. Patient with confusion and agitation overnight, pulled out IV and pulled off telemetry. Given oral Depakote for non-redirectable agitation in setting of prolonged QT. Continued to appear fluid overloaded on exam, required several episodes of HD. Increased Nifedipine to 90 mg qD due to hypertension. Increased prandial and long  acting insulin regimen due to continued hyperglycemia. Holding phosphate binder per Nephrology. HD with 4350 mL fluid removed. Resumed home Hydralazine. Echo with EF of 63%, grade II left ventricular diastolic dysfunction, CVP of 15 mmHg, and moderate-severe pulmonary hypertension. Persistently volume overloaded even with several serial HD sessions. Nephrology consulted, recommended repeat CXR to determine if pleural effusions are improving or worsening.         Interval History:  NAEON. AFVSS.  Evaluated at bedside, appears significantly volume overloaded. Worsening facial swelling and rales on ausculation. Requiring 5L NC (uses 4L at NH) to maintain oxygen saturations, increased work of breathing with ambulation.   Hypertension improved with medication regimen, however SBP >180 as he refused dialysis today; likely secondary to volume overload.   No complaints at this time, only states he would like to return to his nursing home soon.    Review of Systems   Constitutional:  Negative for activity change, chills and fever.   HENT:  Positive for facial swelling (periorbital, perioral - improved). Negative for congestion, drooling and trouble swallowing.    Eyes:  Negative for photophobia and visual disturbance.   Respiratory:  Positive for shortness of breath. Negative for cough, chest tightness and wheezing.         Orthopnea    Cardiovascular:  Positive for leg swelling. Negative for chest pain and palpitations.   Gastrointestinal:  Negative for abdominal distention, abdominal pain, constipation, diarrhea, nausea and vomiting.   Endocrine: Negative for cold intolerance and heat intolerance.   Genitourinary:  Negative for difficulty urinating, dysuria, frequency, hematuria and urgency.   Musculoskeletal:  Negative for arthralgias, back pain and gait problem.   Skin:  Negative for color change, pallor and rash.   Allergic/Immunologic: Negative for immunocompromised state.   Neurological:  Negative for dizziness,  syncope, weakness, light-headedness, numbness and headaches.   Psychiatric/Behavioral:  Positive for confusion (resolved). Negative for agitation and behavioral problems. The patient is not nervous/anxious.    Objective:     Vital Signs (Most Recent):  Temp: 97.7 °F (36.5 °C) (11/25/22 1637)  Pulse: 78 (11/25/22 1637)  Resp: 18 (11/25/22 1637)  BP: (!) 172/84 (11/25/22 1637)  SpO2: 95 % (11/25/22 1637)   Vital Signs (24h Range):  Temp:  [97.3 °F (36.3 °C)-98.6 °F (37 °C)] 97.7 °F (36.5 °C)  Pulse:  [70-87] 78  Resp:  [16-18] 18  SpO2:  [85 %-98 %] 95 %  BP: (141-184)/(75-88) 172/84     Weight: 84.8 kg (187 lb)  Body mass index is 27.62 kg/m².    Intake/Output Summary (Last 24 hours) at 11/25/2022 1659  Last data filed at 11/25/2022 1358  Gross per 24 hour   Intake 440 ml   Output --   Net 440 ml      Physical Exam  Vitals and nursing note reviewed.   Constitutional:       General: He is not in acute distress.     Appearance: He is well-developed.   HENT:      Head: Normocephalic and atraumatic.      Comments: Periorbital and perioral facial swelling      Mouth/Throat:      Pharynx: No oropharyngeal exudate.   Eyes:      Conjunctiva/sclera: Conjunctivae normal.      Pupils: Pupils are equal, round, and reactive to light.   Cardiovascular:      Rate and Rhythm: Normal rate and regular rhythm.      Heart sounds: Normal heart sounds.      Arteriovenous access: Left arteriovenous access is present.  Pulmonary:      Effort: Pulmonary effort is normal. No respiratory distress.      Breath sounds: No stridor. Rales (diffuse coarse rales througout lung fields) present. No wheezing or rhonchi.      Comments: Work of breathing slightly improved  Abdominal:      General: Bowel sounds are normal. There is no distension.      Palpations: Abdomen is soft.      Tenderness: There is no abdominal tenderness.   Musculoskeletal:         General: No tenderness. Normal range of motion.      Cervical back: Normal range of motion and neck  supple.      Right lower le+ Pitting Edema present.      Left lower le+ Pitting Edema present.   Lymphadenopathy:      Cervical: No cervical adenopathy.   Skin:     General: Skin is warm and dry.      Capillary Refill: Capillary refill takes less than 2 seconds.      Findings: No rash.   Neurological:      Mental Status: He is alert and oriented to person, place, and time.      Cranial Nerves: No cranial nerve deficit.      Sensory: No sensory deficit.      Coordination: Coordination normal.   Psychiatric:         Behavior: Behavior normal.         Thought Content: Thought content normal.         Judgment: Judgment normal.       Significant Labs: All pertinent labs within the past 24 hours have been reviewed.  CBC:   Recent Labs   Lab 22   WBC 5.43   HGB 8.9*   HCT 28.1*        CMP:   Recent Labs   Lab 22   *   K 4.5      CO2 20*   *   BUN 36*   CREATININE 4.6*   CALCIUM 8.0*   PROT 6.3   ALBUMIN 2.6*   BILITOT 0.6   ALKPHOS 250*   AST 30   ALT 28   ANIONGAP 12     Cardiac Markers: No results for input(s): CKMB, MYOGLOBIN, BNP, TROPISTAT in the last 48 hours.    Significant Imaging: I have reviewed all pertinent imaging results/findings within the past 24 hours.    CXR PA and Lateral 2022  FINDINGS:  Mild cardiomegaly.  Diffuse accentuation interstitial markings possibly infiltrates and/or interstitial edema mostly at the lung bases.  Small amount of fluid in the right interlobar fissure and slightly blunted costophrenic angles noted posteriorly.  The lung apices are clear.  No significant airspace consolidation.     Slightly improved aeration of the lungs as compared to the previous study      Assessment/Plan:      * Acute HFrEF (heart failure with reduced ejection fraction)  - CHF is currently uncontrolled due to Continued edema of extremities and hypervolemia in the setting of renal failure  - Latest echo 10/12/2022: EF 40%, (+)DD, CVP 15 mmHg, PAP  65mmHg  - Continue Beta Blocker    - home diuretic: n/a may benefit from Lasix    - hospital diuresis: s/p Lasix 100 mg on 11/19   - carvediloL (COREG) 3.125 MG tablet BID   - Place on fluid restriction of 1.5 L. Continue to stress to patient importance of self efficacy and  on diet for CHF.   - Monitor clinical status closely. Monitor on telemetry.   - Monitor strict Is&Os and daily weights.    - Last BNP reviewed- and noted below    - Repeat BNP below  - CXR showing diffuse accentuation interstitial markings possibly infiltrates and/or interstitial edema mostly at the lung bases.  Small amount of fluid in the right interlobar fissure and slightly blunted costophrenic angles noted posteriorly  - Echo with EF of 63%, grade II left ventricular diastolic dysfunction, CVP of 15 mmHg, and moderate-severe pulmonary hypertension.   Recent Labs   Lab 11/23/22  0858   BNP 3,024*       Hypertensive urgency  Renovascular Hypertension  Patient has a current diagnosis of hypertensive urgency (without evidence of end organ damage) which is uncontrolled.  Latest blood pressure and vitals reviewed-   Temp:  [97.3 °F (36.3 °C)-98.6 °F (37 °C)]   Pulse:  [70-87]   Resp:  [16-18]   BP: (141-184)/(75-88)   SpO2:  [85 %-98 %] .   - Patient currently off IV antihypertensives.   - Home meds for hypertension were reviewed.   - Medication adjustment for hospital antihypertensives is as follows:   - Nifedipine to 90 mg qD   - Carvedilol 6.25 mg BID   - Clonidine patch every Monday   - Imdur 30 mg BID   - resumed Hydralazine 100 mg TID  - Will continue to monitor vital signs.   - Will aim for controlled BP reduction by medications noted above. Monitor and mitigate end organ damage as indicated.    Agitation  - agitated and confused overnight on 11/19   - improved  - s/p oral depakote x1 with improvement   - continue to monitor    HLD (hyperlipidemia)  - Continue statin    Renovascular hypertension  - isosorbide mononitrate (IMDUR) 30  MG 24 hr tablet BID   - NIFEdipine (ADALAT CC) 120 MG TbSR daily   - hydrALAZINE (APRESOLINE) 100 MG tablet TID PRN   - carvediloL (COREG) 3.125 MG tablet BID   - cloNIDine 0.3 mg/24 hr td ptwk (CATAPRES) 0.3 mg/24 hr qweekly    Anemia in ESRD (end-stage renal disease)  - Current CBC reviewed-   Lab Results   Component Value Date    HGB 8.9 (L) 11/25/2022    HCT 28.1 (L) 11/25/2022     - Patient's anemia is currently controlled.   - Etiology likely d/t anemia of chronic disease, iron deficiency   - Monitor serial CBC and transfuse if patient becomes hemodynamically unstable, symptomatic or H/H drops below 7/21.     Chronic hypoxemic respiratory failure  Patient with Hypoxic Respiratory failure which is Chronic.  he is on home oxygen at 2-3 LPM. Supplemental oxygen was provided and noted. Labs and images were reviewed. Patient Has not had a recent ABG. Will treat underlying causes and adjust management of respiratory failure as follows-   - HD for volume removal; removed 6L so far  - HD today, removed 4350 mL  - fluticasone-salmeterol diskus inhaler 250-50 mcg BID  - tiotropium bromide (SPIRIVA RESPIMAT) 2.5 mcg/actuation inhaler daily  - DuoNebs PRN    Type 1 diabetes mellitus with chronic kidney disease on chronic dialysis  Patient's FSGs are uncontrolled due to hyperglycemia on current medication regimen.  Last A1c reviewed-   Lab Results   Component Value Date    HGBA1C 11.9 (H) 10/12/2022     Most recent fingerstick glucose reviewed-   Recent Labs   Lab 11/24/22 2022 11/25/22  0757 11/25/22  1112 11/25/22  1638   POCTGLUCOSE 300* 197* 161* 129*     Current correctional scale  Medium  Maintain anti-hyperglycemic dose as follows-   Antihyperglycemics (From admission, onward)    Start     Stop Route Frequency Ordered    11/24/22 0715  insulin aspart U-100 pen 6 Units         -- SubQ 3 times daily with meals 11/23/22 1828    11/23/22 2100  insulin detemir U-100 pen 8 Units         -- SubQ 2 times daily 11/23/22  1828    11/20/22 1820  insulin aspart U-100 pen 0-5 Units         -- SubQ Before meals & nightly PRN 11/20/22 1721        - Holding oral hypoglycemics during admission  - Increased insulin regimen as shown above    ESRD on dialysis  Chronic kidney disease-mineral and bone disorder  MWF schedule  Residual renal function?- Yes  - Nephrology consulted, appreciate recs   - Recommend repeat CXR    - Diffuse accentuation interstitial markings possibly infiltrates and/or interstitial edema mostly at the lung bases.  Small amount of fluid in the right interlobar fissure and slightly blunted costophrenic angles noted posteriorly   - Recommend ECHO, see full report below    - Will plan for UF only treatment for additional volume removal if patient remains inpatient    - No lab stick/BP intake on access site   - Continue to monitor intake and output, daily weights    - Please avoid gadolinium, fleets, phos-based laxatives, NSAIDs   - Will follow closely and continue dialysis treatments while in-patient   - Will collect iron studies   - Will hold Epogen due to HTN   - Continue chronic hemodialysis   - Renal diet with protein intake goal 1.5 g/kg/d   - Novasource with meals    - F/U PO4, Mg, Calcium. And albumin levels.    - Phos 1.8. Please hold binders.   - Holding calcium acetate,phosphat bind, (PHOSLO) 667 mg capsule TID per Nephro  - Monitor daily electrolytes and defer dialysis orders to nephrology    Results for orders placed during the hospital encounter of 11/18/22    Echo    Interpretation Summary  · The left ventricle is normal in size with mild concentric hypertrophy and increased density and normal systolic function.  · The estimated ejection fraction is 63%.  · Grade II left ventricular diastolic dysfunction.  · Severe left atrial enlargement.  · Mild right ventricular enlargement with low normal right ventricular systolic function.  · There is right ventricular hypertrophy.  · Moderate right atrial enlargement.  ·  Mild to moderate tricuspid regurgitation.  · Mild pulmonic regurgitation.  · Elevated central venous pressure (15 mmHg).  · The estimated PA systolic pressure is 67 mmHg.  · There is moderate-severe pulmonary hypertension.  · Trivial posterolateral pericardial effusion.  · There is a moderate left pleural effusion.  · There may be some ascites present.    Cellulitis of left lower extremity  - chronic changes to bilateral lower extremities  - no active infection observed on exam  - Lower extremity edema L>R  - US BLE negative for DVT      VTE Risk Mitigation (From admission, onward)         Ordered     apixaban tablet 5 mg  2 times daily         11/23/22 1133     IP VTE HIGH RISK PATIENT  Once         11/20/22 1721     Place sequential compression device  Until discontinued         11/20/22 1721                Discharge Planning   GERA: 11/25/2022     Code Status: Full Code   Is the patient medically ready for discharge?: No    Reason for patient still in hospital (select all that apply): Patient new problem, Patient trending condition, Treatment, Imaging and Consult recommendations  Discharge Plan A: Return to nursing home            Alissa Song PA-C  Department of Hospital Medicine   [facility] - [facility] 11H

## 2022-11-25 NOTE — PLAN OF CARE
Pt alert, stable; scheduled meds given without difficulty; no distress noted during shift; safety maintained.    Problem: Device-Related Complication Risk (Hemodialysis)  Goal: Safe, Effective Therapy Delivery  Outcome: Ongoing, Progressing     Problem: Hemodynamic Instability (Hemodialysis)  Goal: Effective Tissue Perfusion  Outcome: Ongoing, Progressing     Problem: Infection (Hemodialysis)  Goal: Absence of Infection Signs and Symptoms  Outcome: Ongoing, Progressing     Problem: Adult Inpatient Plan of Care  Goal: Plan of Care Review  Outcome: Ongoing, Progressing  Goal: Patient-Specific Goal (Individualized)  Outcome: Ongoing, Progressing  Goal: Absence of Hospital-Acquired Illness or Injury  Outcome: Ongoing, Progressing  Goal: Optimal Comfort and Wellbeing  Outcome: Ongoing, Progressing  Goal: Readiness for Transition of Care  Outcome: Ongoing, Progressing     Problem: Electrolyte Imbalance (Chronic Kidney Disease)  Goal: Electrolyte Balance  Outcome: Ongoing, Progressing     Problem: Fluid Volume Excess (Chronic Kidney Disease)  Goal: Fluid Balance  Outcome: Ongoing, Progressing     Problem: Skin Injury Risk Increased  Goal: Skin Health and Integrity  Outcome: Ongoing, Progressing     Problem: Infection  Goal: Absence of Infection Signs and Symptoms  Outcome: Ongoing, Progressing

## 2022-11-25 NOTE — NURSING
ELISABETH RODRIGUEZ secure text paged the following @ 1903;    O2 sat 91% with deep breaths, while awake, and on 5L NC. The patient cannot states how many liters he is using @ home. Pt refusing HD - and tele box.     Berna Diaz RN

## 2022-11-25 NOTE — SUBJECTIVE & OBJECTIVE
Interval History:  NAEON. AFVSS.  Evaluated at bedside, appears significantly volume overloaded. Worsening facial swelling and rales on ausculation. Requiring 5L NC (uses 4L at NH) to maintain oxygen saturations, increased work of breathing with ambulation.   Hypertension improved with medication regimen, however SBP >180 as he refused dialysis today; likely secondary to volume overload.   No complaints at this time, only states he would like to return to his nursing home soon.    Review of Systems   Constitutional:  Negative for activity change, chills and fever.   HENT:  Positive for facial swelling (periorbital, perioral - improved). Negative for congestion, drooling and trouble swallowing.    Eyes:  Negative for photophobia and visual disturbance.   Respiratory:  Positive for shortness of breath. Negative for cough, chest tightness and wheezing.         Orthopnea    Cardiovascular:  Positive for leg swelling. Negative for chest pain and palpitations.   Gastrointestinal:  Negative for abdominal distention, abdominal pain, constipation, diarrhea, nausea and vomiting.   Endocrine: Negative for cold intolerance and heat intolerance.   Genitourinary:  Negative for difficulty urinating, dysuria, frequency, hematuria and urgency.   Musculoskeletal:  Negative for arthralgias, back pain and gait problem.   Skin:  Negative for color change, pallor and rash.   Allergic/Immunologic: Negative for immunocompromised state.   Neurological:  Negative for dizziness, syncope, weakness, light-headedness, numbness and headaches.   Psychiatric/Behavioral:  Positive for confusion (resolved). Negative for agitation and behavioral problems. The patient is not nervous/anxious.    Objective:     Vital Signs (Most Recent):  Temp: 97.7 °F (36.5 °C) (11/25/22 1637)  Pulse: 78 (11/25/22 1637)  Resp: 18 (11/25/22 1637)  BP: (!) 172/84 (11/25/22 1637)  SpO2: 95 % (11/25/22 1637)   Vital Signs (24h Range):  Temp:  [97.3 °F (36.3 °C)-98.6 °F (37  °C)] 97.7 °F (36.5 °C)  Pulse:  [70-87] 78  Resp:  [16-18] 18  SpO2:  [85 %-98 %] 95 %  BP: (141-184)/(75-88) 172/84     Weight: 84.8 kg (187 lb)  Body mass index is 27.62 kg/m².    Intake/Output Summary (Last 24 hours) at 2022 1659  Last data filed at 2022 1358  Gross per 24 hour   Intake 440 ml   Output --   Net 440 ml      Physical Exam  Vitals and nursing note reviewed.   Constitutional:       General: He is not in acute distress.     Appearance: He is well-developed.   HENT:      Head: Normocephalic and atraumatic.      Comments: Periorbital and perioral facial swelling      Mouth/Throat:      Pharynx: No oropharyngeal exudate.   Eyes:      Conjunctiva/sclera: Conjunctivae normal.      Pupils: Pupils are equal, round, and reactive to light.   Cardiovascular:      Rate and Rhythm: Normal rate and regular rhythm.      Heart sounds: Normal heart sounds.      Arteriovenous access: Left arteriovenous access is present.  Pulmonary:      Effort: Pulmonary effort is normal. No respiratory distress.      Breath sounds: No stridor. Rales (diffuse coarse rales througout lung fields) present. No wheezing or rhonchi.      Comments: Work of breathing slightly improved  Abdominal:      General: Bowel sounds are normal. There is no distension.      Palpations: Abdomen is soft.      Tenderness: There is no abdominal tenderness.   Musculoskeletal:         General: No tenderness. Normal range of motion.      Cervical back: Normal range of motion and neck supple.      Right lower le+ Pitting Edema present.      Left lower le+ Pitting Edema present.   Lymphadenopathy:      Cervical: No cervical adenopathy.   Skin:     General: Skin is warm and dry.      Capillary Refill: Capillary refill takes less than 2 seconds.      Findings: No rash.   Neurological:      Mental Status: He is alert and oriented to person, place, and time.      Cranial Nerves: No cranial nerve deficit.      Sensory: No sensory deficit.       Coordination: Coordination normal.   Psychiatric:         Behavior: Behavior normal.         Thought Content: Thought content normal.         Judgment: Judgment normal.       Significant Labs: All pertinent labs within the past 24 hours have been reviewed.  CBC:   Recent Labs   Lab 11/25/22 0226   WBC 5.43   HGB 8.9*   HCT 28.1*        CMP:   Recent Labs   Lab 11/25/22 0226   *   K 4.5      CO2 20*   *   BUN 36*   CREATININE 4.6*   CALCIUM 8.0*   PROT 6.3   ALBUMIN 2.6*   BILITOT 0.6   ALKPHOS 250*   AST 30   ALT 28   ANIONGAP 12     Cardiac Markers: No results for input(s): CKMB, MYOGLOBIN, BNP, TROPISTAT in the last 48 hours.    Significant Imaging: I have reviewed all pertinent imaging results/findings within the past 24 hours.    CXR PA and Lateral 11/25/2022  FINDINGS:  Mild cardiomegaly.  Diffuse accentuation interstitial markings possibly infiltrates and/or interstitial edema mostly at the lung bases.  Small amount of fluid in the right interlobar fissure and slightly blunted costophrenic angles noted posteriorly.  The lung apices are clear.  No significant airspace consolidation.     Slightly improved aeration of the lungs as compared to the previous study

## 2022-11-25 NOTE — SUBJECTIVE & OBJECTIVE
Interval History: Continues on 5L NC. Pt refusing dialysis. States he is going home and does not want dialysis. UF only tx yesterday 3L, tolerated well    Review of patient's allergies indicates:  No Known Allergies  Current Facility-Administered Medications   Medication Frequency    0.9%  NaCl infusion Once    acetaminophen tablet 650 mg Q8H PRN    acetaminophen tablet 650 mg Q4H PRN    albuterol-ipratropium 2.5 mg-0.5 mg/3 mL nebulizer solution 3 mL Q6H PRN    aluminum-magnesium hydroxide-simethicone 200-200-20 mg/5 mL suspension 30 mL QID PRN    apixaban tablet 5 mg BID    aspirin EC tablet 81 mg Daily    atorvastatin tablet 40 mg Daily    carvediloL tablet 6.25 mg BID    cloNIDine 0.3 mg/24 hr td ptwk 1 patch Every Mon    dextrose 10% bolus 125 mL PRN    dextrose 10% bolus 250 mL PRN    diphenhydrAMINE capsule 25 mg Once    fluticasone furoate-vilanteroL 100-25 mcg/dose diskus inhaler 1 puff Daily    glucagon (human recombinant) injection 1 mg PRN    glucose chewable tablet 16 g PRN    glucose chewable tablet 24 g PRN    hydrALAZINE tablet 100 mg TID    insulin aspart U-100 pen 0-5 Units QID (AC + HS) PRN    insulin aspart U-100 pen 6 Units TIDWM    insulin detemir U-100 pen 8 Units BID    isosorbide mononitrate 24 hr tablet 30 mg BID    LIDOcaine HCl 2% urojet PRN    melatonin tablet 6 mg Nightly PRN    mupirocin 2 % ointment BID    naloxone 0.4 mg/mL injection 0.02 mg PRN    NIFEdipine 24 hr tablet 90 mg Daily    ondansetron disintegrating tablet 8 mg Q8H PRN    polyethylene glycol packet 17 g Daily    senna tablet 8.6 mg BID    sodium chloride 0.9% flush 10 mL Q12H PRN    tiotropium bromide 2.5 mcg/actuation inhaler 2 puff Daily       Objective:     Vital Signs (Most Recent):  Temp: 97.3 °F (36.3 °C) (11/25/22 1122)  Pulse: 72 (11/25/22 1122)  Resp: 16 (11/25/22 1122)  BP: (!) 184/88 (11/25/22 1122)  SpO2: 96 % (11/25/22 1122)  O2 Device (Oxygen Therapy): nasal cannula (11/25/22 3130)   Vital Signs (24h  Range):  Temp:  [97.3 °F (36.3 °C)-98.6 °F (37 °C)] 97.3 °F (36.3 °C)  Pulse:  [68-87] 72  Resp:  [16-18] 16  SpO2:  [85 %-98 %] 96 %  BP: (141-192)/() 184/88     Weight: 84.8 kg (187 lb) (11/23/22 1300)  Body mass index is 27.62 kg/m².  Body surface area is 2.03 meters squared.    I/O last 3 completed shifts:  In: 1140 [P.O.:540; Other:600]  Out: 3600 [Other:3600]    Physical Exam  Vitals and nursing note reviewed.   Constitutional:       Appearance: He is not ill-appearing.      Interventions: Nasal cannula in place.   HENT:      Head: Normocephalic and atraumatic.      Nose: Nose normal.      Mouth/Throat:      Mouth: Mucous membranes are moist.      Pharynx: Oropharynx is clear.   Eyes:      Conjunctiva/sclera: Conjunctivae normal.   Neck:      Comments: Positive JVD  Cardiovascular:      Rate and Rhythm: Normal rate.      Comments: LUE AVF with +thrill and +bruit   Pulmonary:      Breath sounds: Rales present.   Abdominal:      General: There is distension.      Tenderness: There is no abdominal tenderness.   Musculoskeletal:      Cervical back: Normal range of motion and neck supple.      Right lower leg: Edema present.      Left lower leg: Edema present.   Skin:     General: Skin is warm and dry.   Neurological:      General: No focal deficit present.      Mental Status: He is alert and oriented to person, place, and time.   Psychiatric:         Mood and Affect: Mood normal.         Behavior: Behavior normal.       Significant Labs:  CBC:   Recent Labs   Lab 11/25/22 0226   WBC 5.43   RBC 3.09*   HGB 8.9*   HCT 28.1*      MCV 91   MCH 28.8   MCHC 31.7*     CMP:   Recent Labs   Lab 11/25/22 0226   *   CALCIUM 8.0*   ALBUMIN 2.6*   PROT 6.3   *   K 4.5   CO2 20*      BUN 36*   CREATININE 4.6*   ALKPHOS 250*   ALT 28   AST 30   BILITOT 0.6     All labs within the past 24 hours have been reviewed.

## 2022-11-25 NOTE — ASSESSMENT & PLAN NOTE
Outpatient HD Information:    -Outpatient HD unit: [facility]  -HD tx days: MWF  -HD tx time: 240mins  -Last HD tx prior to presentation: 11/16/22  -HD access: LUE AVF  -HD modality: iHD   -Residual urine: Anuric   -EDW: 79kg     Plan/Recommendations:    -Plan for HD today to keep outpatient schedule, however pt refusing   -Strict I/O's and daily weights  -Daily renal function panels   -Renally dose meds

## 2022-11-25 NOTE — ASSESSMENT & PLAN NOTE
- Current CBC reviewed-   Lab Results   Component Value Date    HGB 8.9 (L) 11/25/2022    HCT 28.1 (L) 11/25/2022     - Patient's anemia is currently controlled.   - Etiology likely d/t anemia of chronic disease, iron deficiency   - Monitor serial CBC and transfuse if patient becomes hemodynamically unstable, symptomatic or H/H drops below 7/21.

## 2022-11-26 NOTE — SUBJECTIVE & OBJECTIVE
Interval History: HD completed this am. Net UF 3L. O2 requirements decreased from 5L to 3L.     Review of patient's allergies indicates:  No Known Allergies  Current Facility-Administered Medications   Medication Frequency    0.9%  NaCl infusion Once    [START ON 11/27/2022] 0.9%  NaCl infusion Once    acetaminophen tablet 650 mg Q8H PRN    acetaminophen tablet 650 mg Q4H PRN    albuterol-ipratropium 2.5 mg-0.5 mg/3 mL nebulizer solution 3 mL Q6H PRN    aluminum-magnesium hydroxide-simethicone 200-200-20 mg/5 mL suspension 30 mL QID PRN    apixaban tablet 5 mg BID    aspirin EC tablet 81 mg Daily    atorvastatin tablet 40 mg Daily    carvediloL tablet 6.25 mg BID    cloNIDine 0.3 mg/24 hr td ptwk 1 patch Every Mon    dextrose 10% bolus 125 mL PRN    dextrose 10% bolus 250 mL PRN    diphenhydrAMINE capsule 25 mg Once    fluticasone furoate-vilanteroL 100-25 mcg/dose diskus inhaler 1 puff Daily    glucagon (human recombinant) injection 1 mg PRN    glucose chewable tablet 16 g PRN    glucose chewable tablet 24 g PRN    hydrALAZINE tablet 100 mg TID    insulin aspart U-100 pen 0-5 Units QID (AC + HS) PRN    insulin aspart U-100 pen 6 Units TIDWM    insulin detemir U-100 pen 6 Units BID    isosorbide mononitrate 24 hr tablet 30 mg BID    LIDOcaine HCl 2% urojet PRN    melatonin tablet 6 mg Nightly PRN    mupirocin 2 % ointment BID    naloxone 0.4 mg/mL injection 0.02 mg PRN    NIFEdipine 24 hr tablet 90 mg Daily    ondansetron disintegrating tablet 8 mg Q8H PRN    polyethylene glycol packet 17 g Daily    senna tablet 8.6 mg BID    sodium chloride 0.9% flush 10 mL Q12H PRN    tiotropium bromide 2.5 mcg/actuation inhaler 2 puff Daily       Objective:     Vital Signs (Most Recent):  Temp: 98.6 °F (37 °C) (11/26/22 1129)  Pulse: 81 (11/26/22 1536)  Resp: 18 (11/26/22 1129)  BP: (!) 159/76 (11/26/22 1129)  SpO2: 96 % (11/26/22 1129)  O2 Device (Oxygen Therapy): nasal cannula (11/26/22 1129)   Vital Signs (24h Range):  Temp:   [97 °F (36.1 °C)-98.6 °F (37 °C)] 98.6 °F (37 °C)  Pulse:  [68-86] 81  Resp:  [16-20] 18  SpO2:  [93 %-97 %] 96 %  BP: (117-172)/(61-84) 159/76     Weight: 84.8 kg (187 lb) (22 1300)  Body mass index is 27.62 kg/m².  Body surface area is 2.03 meters squared.    I/O last 3 completed shifts:  In: 676 [P.O.:676]  Out: 3500 [Other:3500]    Physical Exam  Vitals and nursing note reviewed.   Constitutional:       General: He is not in acute distress.     Appearance: He is well-developed.   HENT:      Head: Normocephalic and atraumatic.      Comments: Periorbital and perioral facial swelling      Mouth/Throat:      Pharynx: No oropharyngeal exudate.   Eyes:      Conjunctiva/sclera: Conjunctivae normal.      Pupils: Pupils are equal, round, and reactive to light.   Cardiovascular:      Rate and Rhythm: Normal rate and regular rhythm.      Heart sounds: Normal heart sounds.      Arteriovenous access: Left arteriovenous access is present.  Pulmonary:      Effort: Pulmonary effort is normal. No respiratory distress.      Breath sounds: No stridor. Rales (diffuse coarse rales througout lung fields) present. No wheezing or rhonchi.      Comments: Work of breathing slightly improved  Abdominal:      General: Bowel sounds are normal. There is no distension.      Palpations: Abdomen is soft.      Tenderness: There is no abdominal tenderness.   Musculoskeletal:         General: No tenderness. Normal range of motion.      Cervical back: Normal range of motion and neck supple.      Right lower le+ Pitting Edema present.      Left lower le+ Pitting Edema present.   Lymphadenopathy:      Cervical: No cervical adenopathy.   Skin:     General: Skin is warm and dry.      Capillary Refill: Capillary refill takes less than 2 seconds.      Findings: No rash.   Neurological:      Mental Status: He is alert and oriented to person, place, and time.      Cranial Nerves: No cranial nerve deficit.      Sensory: No sensory deficit.       Coordination: Coordination normal.   Psychiatric:         Behavior: Behavior normal.         Thought Content: Thought content normal.         Judgment: Judgment normal.       Significant Labs:  CBC:   Recent Labs   Lab 11/25/22 0226   WBC 5.43   RBC 3.09*   HGB 8.9*   HCT 28.1*      MCV 91   MCH 28.8   MCHC 31.7*     CMP:   Recent Labs   Lab 11/25/22 0226   *   CALCIUM 8.0*   ALBUMIN 2.6*   PROT 6.3   *   K 4.5   CO2 20*      BUN 36*   CREATININE 4.6*   ALKPHOS 250*   ALT 28   AST 30   BILITOT 0.6     All labs within the past 24 hours have been reviewed.

## 2022-11-26 NOTE — PROGRESS NOTES
[facility]  Nephrology  Progress Note    Patient Name: Cosme Lewis  MRN: [id number]  Admission Date: 11/18/2022  Hospital Length of Stay: 6 days  Attending Provider: Jose Daly MD   Primary Care Physician: Primary Doctor No  Principal Problem:Acute HFrEF (heart failure with reduced ejection fraction)    Subjective:       Interval History: HD completed this am. Net UF 3L. O2 requirements decreased from 5L to 3L.     Review of patient's allergies indicates:  No Known Allergies  Current Facility-Administered Medications   Medication Frequency    0.9%  NaCl infusion Once    [START ON 11/27/2022] 0.9%  NaCl infusion Once    acetaminophen tablet 650 mg Q8H PRN    acetaminophen tablet 650 mg Q4H PRN    albuterol-ipratropium 2.5 mg-0.5 mg/3 mL nebulizer solution 3 mL Q6H PRN    aluminum-magnesium hydroxide-simethicone 200-200-20 mg/5 mL suspension 30 mL QID PRN    apixaban tablet 5 mg BID    aspirin EC tablet 81 mg Daily    atorvastatin tablet 40 mg Daily    carvediloL tablet 6.25 mg BID    cloNIDine 0.3 mg/24 hr td ptwk 1 patch Every Mon    dextrose 10% bolus 125 mL PRN    dextrose 10% bolus 250 mL PRN    diphenhydrAMINE capsule 25 mg Once    fluticasone furoate-vilanteroL 100-25 mcg/dose diskus inhaler 1 puff Daily    glucagon (human recombinant) injection 1 mg PRN    glucose chewable tablet 16 g PRN    glucose chewable tablet 24 g PRN    hydrALAZINE tablet 100 mg TID    insulin aspart U-100 pen 0-5 Units QID (AC + HS) PRN    insulin aspart U-100 pen 6 Units TIDWM    insulin detemir U-100 pen 6 Units BID    isosorbide mononitrate 24 hr tablet 30 mg BID    LIDOcaine HCl 2% urojet PRN    melatonin tablet 6 mg Nightly PRN    mupirocin 2 % ointment BID    naloxone 0.4 mg/mL injection 0.02 mg PRN    NIFEdipine 24 hr tablet 90 mg Daily    ondansetron disintegrating tablet 8 mg Q8H PRN    polyethylene glycol packet 17 g Daily    senna tablet 8.6 mg BID    sodium chloride 0.9%  flush 10 mL Q12H PRN    tiotropium bromide 2.5 mcg/actuation inhaler 2 puff Daily       Objective:     Vital Signs (Most Recent):  Temp: 98.6 °F (37 °C) (22 1129)  Pulse: 81 (22 1536)  Resp: 18 (22 1129)  BP: (!) 159/76 (22 1129)  SpO2: 96 % (22 112)  O2 Device (Oxygen Therapy): nasal cannula (22 112)   Vital Signs (24h Range):  Temp:  [97 °F (36.1 °C)-98.6 °F (37 °C)] 98.6 °F (37 °C)  Pulse:  [68-86] 81  Resp:  [16-20] 18  SpO2:  [93 %-97 %] 96 %  BP: (117-172)/(61-84) 159/76     Weight: 84.8 kg (187 lb) (22 1300)  Body mass index is 27.62 kg/m².  Body surface area is 2.03 meters squared.    I/O last 3 completed shifts:  In: 676 [P.O.:676]  Out: 3500 [Other:3500]    Physical Exam  Vitals and nursing note reviewed.   Constitutional:       General: He is not in acute distress.     Appearance: He is well-developed.   HENT:      Head: Normocephalic and atraumatic.      Comments: Periorbital and perioral facial swelling      Mouth/Throat:      Pharynx: No oropharyngeal exudate.   Eyes:      Conjunctiva/sclera: Conjunctivae normal.      Pupils: Pupils are equal, round, and reactive to light.   Cardiovascular:      Rate and Rhythm: Normal rate and regular rhythm.      Heart sounds: Normal heart sounds.      Arteriovenous access: Left arteriovenous access is present.  Pulmonary:      Effort: Pulmonary effort is normal. No respiratory distress.      Breath sounds: No stridor. Rales (diffuse coarse rales througout lung fields) present. No wheezing or rhonchi.      Comments: Work of breathing slightly improved  Abdominal:      General: Bowel sounds are normal. There is no distension.      Palpations: Abdomen is soft.      Tenderness: There is no abdominal tenderness.   Musculoskeletal:         General: No tenderness. Normal range of motion.      Cervical back: Normal range of motion and neck supple.      Right lower le+ Pitting Edema present.      Left lower le+ Pitting  Edema present.   Lymphadenopathy:      Cervical: No cervical adenopathy.   Skin:     General: Skin is warm and dry.      Capillary Refill: Capillary refill takes less than 2 seconds.      Findings: No rash.   Neurological:      Mental Status: He is alert and oriented to person, place, and time.      Cranial Nerves: No cranial nerve deficit.      Sensory: No sensory deficit.      Coordination: Coordination normal.   Psychiatric:         Behavior: Behavior normal.         Thought Content: Thought content normal.         Judgment: Judgment normal.       Significant Labs:  CBC:   Recent Labs   Lab 11/25/22 0226   WBC 5.43   RBC 3.09*   HGB 8.9*   HCT 28.1*      MCV 91   MCH 28.8   MCHC 31.7*     CMP:   Recent Labs   Lab 11/25/22 0226   *   CALCIUM 8.0*   ALBUMIN 2.6*   PROT 6.3   *   K 4.5   CO2 20*      BUN 36*   CREATININE 4.6*   ALKPHOS 250*   ALT 28   AST 30   BILITOT 0.6     All labs within the past 24 hours have been reviewed.         Assessment/Plan:     * Acute HFrEF (heart failure with reduced ejection fraction)  - defer to primary team  UF with HD    Anemia in ESRD (end-stage renal disease)  Target 10-11    Chronic kidney disease-mineral and bone disorder  -Low phos diet   Phos 2.1, hold phos binders     ESRD on dialysis  Outpatient HD Information:    -[facility] [place] [facility]  -HD tx days: MWF  -HD tx time: 240mins  -Last HD tx prior to presentation: 11/16/22  -HD access: LUE AVF  -HD modality: iHD   -Residual urine: Anuric   -EDW: 79kg     Last HD prior to hospitalization 11/16 pt was 11kg above EDW    Plan/Recommendations:    -Improvement in 02 requirements, plan for next HD 11/28 for volume management and metabolic clearance   -Obtain standing weight prior to HD and after to determine new target weight   -1.5 fluid restrictions   -Strict I/O's and daily weights  -Daily renal function panels   -Renally dose meds          Thank you for your consult. I will follow-up with  patient. Please contact us if you have any additional questions.    Citlalli Simpson DNP  Nephrology  [facility]

## 2022-11-26 NOTE — PROGRESS NOTES
11/26/22 0630   Vital Signs   Temp 98 °F (36.7 °C)   Temp src Oral   Pulse 86   BP (!) 154/75   BP Location Right arm   BP Method Automatic   Patient Position Lying   Pre-Hemodialysis Assessment   Treatment Status Completed   During Hemodialysis Assessment   Blood Volume Processed (Liters) 61.2 L   UF Removed (mL) 3500 mL   Intake (mL) 250 mL   Intra-Hemodialysis Comments Tx complete   Post-Hemodialysis Assessment   Rinseback Volume (mL) 500 mL   Blood Volume Processed (Liters) 62.2 L   Dialyzer Clearance Lightly streaked   Duration of Treatment 180 minutes   Total UF (mL) 3500 mL   Net Fluid Removal 3000   Patient Response to Treatment pt tolerated Tx well   Post-Hemodialysis Comments pt stable         Pt tolerated TX well; access secured; total net UF 3000 ml; pt stable at this time; see flow sheet for details.

## 2022-11-26 NOTE — NURSING
Pt complaining of shortness of breath; pt noted to be visibly winded; pt saturating 91-92%, oxygen bumped up to 6 liters; respiratory therapist called; will inform MD.

## 2022-11-26 NOTE — PLAN OF CARE
Pt alert, stable; scheduled meds given without difficuly; pt with distress & complaint of SOB during shift; breathing treatment given & 1 x order of lasix 80 mg IVP given; also hemodialysis done at bedside; pt SOB improved; safety maintained.    Problem: Device-Related Complication Risk (Hemodialysis)  Goal: Safe, Effective Therapy Delivery  Outcome: Ongoing, Progressing     Problem: Hemodynamic Instability (Hemodialysis)  Goal: Effective Tissue Perfusion  Outcome: Ongoing, Progressing     Problem: Infection (Hemodialysis)  Goal: Absence of Infection Signs and Symptoms  Outcome: Ongoing, Progressing     Problem: Adult Inpatient Plan of Care  Goal: Plan of Care Review  Outcome: Ongoing, Progressing  Goal: Patient-Specific Goal (Individualized)  Outcome: Ongoing, Progressing  Goal: Absence of Hospital-Acquired Illness or Injury  Outcome: Ongoing, Progressing  Goal: Optimal Comfort and Wellbeing  Outcome: Ongoing, Progressing  Goal: Readiness for Transition of Care  Outcome: Ongoing, Progressing     Problem: Adult Inpatient Plan of Care  Goal: Patient-Specific Goal (Individualized)  Outcome: Ongoing, Progressing     Problem: Adult Inpatient Plan of Care  Goal: Absence of Hospital-Acquired Illness or Injury  Outcome: Ongoing, Progressing     Problem: Adult Inpatient Plan of Care  Goal: Optimal Comfort and Wellbeing  Outcome: Ongoing, Progressing     Problem: Adult Inpatient Plan of Care  Goal: Readiness for Transition of Care  Outcome: Ongoing, Progressing     Problem: Diabetes Comorbidity  Goal: Blood Glucose Level Within Targeted Range  Outcome: Ongoing, Progressing     Problem: Electrolyte Imbalance (Chronic Kidney Disease)  Goal: Electrolyte Balance  Outcome: Ongoing, Progressing     Problem: Fluid Volume Excess (Chronic Kidney Disease)  Goal: Fluid Balance  Outcome: Ongoing, Progressing     Problem: Skin Injury Risk Increased  Goal: Skin Health and Integrity  Outcome: Ongoing, Progressing     Problem:  Infection  Goal: Absence of Infection Signs and Symptoms  Outcome: Ongoing, Progressing

## 2022-11-26 NOTE — ASSESSMENT & PLAN NOTE
Outpatient HD Information:    -Outpatient HD unit: [facility]  -HD tx days: MWF  -HD tx time: 240mins  -Last HD tx prior to presentation: 11/16/22  -HD access: LUE AVF  -HD modality: iHD   -Residual urine: Anuric   -EDW: 79kg     Last HD prior to hospitalization 11/16 pt was 11kg above EDW    Plan/Recommendations:    -Improvement in 02 requirements, plan for next HD 11/28 for volume management and metabolic clearance   -Obtain standing weight prior to HD and after to determine new target weight   -1.5 fluid restrictions   -Strict I/O's and daily weights  -Daily renal function panels   -Renally dose meds

## 2022-11-26 NOTE — SUBJECTIVE & OBJECTIVE
Interval History: Overnight, patient hypoglycemic to 48, improved with oral glucose. Levemir was held, meal time and long acting decreased. However, blood glucose uncontrolled today. Increased regimen. He also reported to be very SOB, increased to 6L NC and is s/p breathing treatment and IV Lasix 80 mg. Upon my exam this morning, patient reports improvement in SOB, requiring 2.5 L NC. Patient was dialyzed today, removing 3 L. Per Nephrology, his outpatient dialysis unit has target weight at 79 kg, which needs to be adjusted. It seems that he may need longer treatment times as outpatient to pull more fluid off. Patient without complaints today other than wanting to return to NH.    Review of Systems   Constitutional:  Negative for activity change, chills and fever.   HENT:  Positive for facial swelling (periorbital, perioral). Negative for congestion, drooling and trouble swallowing.    Eyes:  Negative for photophobia and visual disturbance.   Respiratory:  Positive for shortness of breath (improved). Negative for cough, chest tightness and wheezing.         Orthopnea    Cardiovascular:  Positive for leg swelling. Negative for chest pain and palpitations.   Gastrointestinal:  Negative for abdominal distention, abdominal pain, constipation, diarrhea, nausea and vomiting.   Endocrine: Negative for cold intolerance and heat intolerance.   Genitourinary:  Negative for difficulty urinating, dysuria, frequency, hematuria and urgency.   Musculoskeletal:  Negative for arthralgias, back pain and gait problem.   Skin:  Negative for color change, pallor and rash.   Allergic/Immunologic: Negative for immunocompromised state.   Neurological:  Negative for dizziness, syncope, weakness, light-headedness, numbness and headaches.   Psychiatric/Behavioral:  Positive for confusion (resolved). Negative for agitation and behavioral problems. The patient is not nervous/anxious.      Objective:     Vital Signs (Most Recent):  Temp: 98 °F  (36.7 °C) (22)  Pulse: 86 (22)  Resp: 20 (220)  BP: (!) 154/75 (22)  SpO2: 95 % (22)   Vital Signs (24h Range):  Temp:  [97 °F (36.1 °C)-98 °F (36.7 °C)] 98 °F (36.7 °C)  Pulse:  [68-86] 86  Resp:  [16-20] 20  SpO2:  [91 %-98 %] 95 %  BP: (117-184)/(61-88) 154/75     Weight: 84.8 kg (187 lb)  Body mass index is 27.62 kg/m².    Intake/Output Summary (Last 24 hours) at 2022 0738  Last data filed at 2022 0630  Gross per 24 hour   Intake 676 ml   Output 3500 ml   Net -2824 ml      Physical Exam  Vitals and nursing note reviewed.   Constitutional:       General: He is not in acute distress.     Appearance: He is well-developed.   HENT:      Head: Normocephalic and atraumatic.      Comments: Periorbital and perioral facial swelling      Mouth/Throat:      Pharynx: No oropharyngeal exudate.   Eyes:      Conjunctiva/sclera: Conjunctivae normal.      Pupils: Pupils are equal, round, and reactive to light.   Cardiovascular:      Rate and Rhythm: Normal rate and regular rhythm.      Heart sounds: Normal heart sounds.      Arteriovenous access: Left arteriovenous access is present.  Pulmonary:      Effort: Pulmonary effort is normal. No respiratory distress.      Breath sounds: No stridor. Rales (diffuse coarse rales througout lung fields) present. No wheezing or rhonchi.      Comments: Work of breathing improved, on 2.5 L NC  Abdominal:      General: Bowel sounds are normal. There is no distension.      Palpations: Abdomen is soft.      Tenderness: There is no abdominal tenderness.   Musculoskeletal:         General: No tenderness. Normal range of motion.      Cervical back: Normal range of motion and neck supple.      Right lower le+ Pitting Edema present.      Left lower le+ Pitting Edema present.   Lymphadenopathy:      Cervical: No cervical adenopathy.   Skin:     General: Skin is warm and dry.      Capillary Refill: Capillary refill takes less than  2 seconds.      Findings: No rash.   Neurological:      Mental Status: He is alert and oriented to person, place, and time.      Cranial Nerves: No cranial nerve deficit.      Sensory: No sensory deficit.      Coordination: Coordination normal.   Psychiatric:         Behavior: Behavior normal.         Thought Content: Thought content normal.         Judgment: Judgment normal.       Significant Labs: All pertinent labs within the past 24 hours have been reviewed.    Significant Imaging: I have reviewed all pertinent imaging results/findings within the past 24 hours.

## 2022-11-26 NOTE — PROGRESS NOTES
11/26/22 0330   Vital Signs   Temp 97 °F (36.1 °C)   Temp src Oral   Pulse 72   /82   BP Location Right arm   BP Method Automatic   Patient Position Lying   Pre-Hemodialysis Assessment   Treatment Status Started   Gross Bleach Negative Yes   Machine Number 20   Alarms Verified Yes   pH 7   Machine Temperature 97.7 °F (36.5 °C)   Dialyzer F-180   Machine Conductivity 14   Meter Conductivity 14   Dialysate Na (mEq/L)   (135)   Dialysate K (mEq/L) 2   Dialysate CA (mEq/L) 3   Dialysate HCO3 (mEq/L) 30   Prime Ordered (mL) 500 mL   Pre-Hemodialysis Comments ptstable for Hd Tx        Hemodialysis AV Fistula Left upper arm   No placement date or time found.   Present Prior to Hospital Arrival?: Yes  Location: Left upper arm   Needle Size 15ga   Site Assessment Clean;Dry;Intact;No redness;No swelling   Patency Present;Thrill;Bruit   Status Accessed   Flows Good   During Hemodialysis Assessment   Blood Flow Rate (mL/min) 400 mL/min   Dialysate Flow Rate (mL/min) 800 ml/min   Ultrafiltration Rate (mL/Hr) 1000 mL/Hr   Arteriovenous Lines Secure Yes   Arterial Pressure (mmHg) -160 mmHg   Venous Pressure (mmHg) 140   TMP 60   Venous Line in Air Detector Yes   Intake (mL) 250 mL   Intra-Hemodialysis Comments TX started       Bedside HD TX started; access secured; Flush lines double clamped; orders verified; pt stable at this time.

## 2022-11-27 NOTE — ASSESSMENT & PLAN NOTE
Renovascular Hypertension  Patient has a current diagnosis of hypertensive urgency (without evidence of end organ damage) which is uncontrolled.  Latest blood pressure and vitals reviewed-   Temp:  [97.4 °F (36.3 °C)-98.4 °F (36.9 °C)]   Pulse:  [68-81]   Resp:  [16-20]   BP: (121-150)/(57-78)   SpO2:  [90 %-96 %] .   - Patient currently off IV antihypertensives.   - Home meds for hypertension were reviewed.   - Medication adjustment for hospital antihypertensives is as follows:   - Nifedipine to 90 mg qD   - Carvedilol 6.25 mg BID   - Clonidine patch every Monday   - Imdur 30 mg BID   - Hydralazine 100 mg TID  - Will continue to monitor vital signs.   - Will aim for controlled BP reduction by medications noted above. Monitor and mitigate end organ damage as indicated.

## 2022-11-27 NOTE — ASSESSMENT & PLAN NOTE
Renovascular Hypertension  Patient has a current diagnosis of hypertensive urgency (without evidence of end organ damage) which is uncontrolled.  Latest blood pressure and vitals reviewed-   Temp:  [97 °F (36.1 °C)-98.6 °F (37 °C)]   Pulse:  [68-86]   Resp:  [16-20]   BP: (117-170)/(57-83)   SpO2:  [93 %-97 %] .   - Patient currently off IV antihypertensives.   - Home meds for hypertension were reviewed.   - Medication adjustment for hospital antihypertensives is as follows:   - Nifedipine to 90 mg qD   - Carvedilol 6.25 mg BID   - Clonidine patch every Monday   - Imdur 30 mg BID   - Hydralazine 100 mg TID  - Will continue to monitor vital signs.   - Will aim for controlled BP reduction by medications noted above. Monitor and mitigate end organ damage as indicated.

## 2022-11-27 NOTE — ASSESSMENT & PLAN NOTE
Patient's FSGs are uncontrolled due to hyperglycemia on current medication regimen.  Last A1c reviewed-   Lab Results   Component Value Date    HGBA1C 11.9 (H) 10/12/2022     Most recent fingerstick glucose reviewed-   Recent Labs   Lab 11/25/22  2332 11/26/22  0818 11/26/22  1130 11/26/22  1649   POCTGLUCOSE 112* 196* 259* 285*     Current correctional scale  Medium  Maintain anti-hyperglycemic dose as follows-   Antihyperglycemics (From admission, onward)    Start     Stop Route Frequency Ordered    11/26/22 2100  insulin detemir U-100 pen 8 Units         -- SubQ 2 times daily 11/26/22 1836    11/26/22 1645  insulin aspart U-100 pen 6 Units         -- SubQ 3 times daily with meals 11/26/22 1421    11/20/22 1820  insulin aspart U-100 pen 0-5 Units         -- SubQ Before meals & nightly PRN 11/20/22 1721        - Holding oral hypoglycemics during admission  - Increased insulin regimen as shown above

## 2022-11-27 NOTE — ASSESSMENT & PLAN NOTE
Patient's FSGs are uncontrolled due to hyperglycemia on current medication regimen.  Last A1c reviewed-   Lab Results   Component Value Date    HGBA1C 11.9 (H) 10/12/2022     Most recent fingerstick glucose reviewed-   Recent Labs   Lab 11/26/22  1936 11/26/22  2333 11/27/22  0825 11/27/22  1208   POCTGLUCOSE 348* 300* 141* 222*     Current correctional scale  Medium  Maintain anti-hyperglycemic dose as follows-   Antihyperglycemics (From admission, onward)    Start     Stop Route Frequency Ordered    11/26/22 2100  insulin detemir U-100 pen 8 Units         -- SubQ 2 times daily 11/26/22 1836    11/26/22 1645  insulin aspart U-100 pen 6 Units         -- SubQ 3 times daily with meals 11/26/22 1421    11/20/22 1820  insulin aspart U-100 pen 0-5 Units         -- SubQ Before meals & nightly PRN 11/20/22 1721        - Holding oral hypoglycemics during admission  - Increased insulin regimen as shown above

## 2022-11-27 NOTE — PROGRESS NOTES
[facility] [address] [place] [address] Medicine  Progress Note    Patient Name: Cosme Lewis  MRN: [id number]  Patient Class: IP- Inpatient   Admission Date: 11/18/2022  Length of Stay: 7 days  Attending Physician: Jose Daly MD  Primary Care Provider: Primary Doctor No        Subjective:     Principal Problem:Acute HFrEF (heart failure with reduced ejection fraction)        HPI:  Cosme Lewis is a 57 y.o. 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 Per ED MD, the facility believed he appeared more overloaded than normal and therefore sent him to the ED. The patient reports he was at his mother's home before being picked up by transportation. He states he passed out in while in transport and never arrived at the facility, he does not seem like the most reliable historian. He reports using supplemental oxygen at baseline, unsure of how many liters. He denies chest pain, worsening shortness of breath, diarrhea, abdominal pain, cough or sore throat. He has no awareness of his medications. He states he lives in a facility for people who are 55 and older. He denies tobacco and alcohol use.     ED: afebrile without leukocytosis.nephrology was consulted for HD. VSS. BNP 2,255. Troponin 0.035. admitted to hospital medicine for further management.        Overview/Hospital Course:  Cosme Lewis was admitted to hospital medicine for management of volume overload in the setting of missed HD. Nephro consulted for HD management. Patient with confusion and agitation overnight, pulled out IV and pulled off telemetry. Given oral Depakote for non-redirectable agitation in setting of prolonged QT. Continued to appear fluid overloaded on exam, required several episodes of HD. Increased Nifedipine to 90 mg qD due to hypertension. Increased prandial and long acting insulin regimen  due to continued hyperglycemia. Holding phosphate binder per Nephrology. Resumed home Hydralazine. Echo with EF of 63%, grade II left ventricular diastolic dysfunction, CVP of 15 mmHg, and moderate-severe pulmonary hypertension. Persistently volume overloaded even with several serial HD sessions. Nephrology consulted, recommended repeat CXR to determine if pleural effusions are improving or worsening. CXR with a small amount of fluid in the right interlobar fissure and slightly blunted costophrenic angles noted posteriorly. Slightly improved aeration of the lungs as compared to the previous study.      Interval History: NINO MEJIA. Evaluated at bedside today with patient's mother present. No emergent dialysis needs today. Patient reports wanting to go back to NH. Discussed the need for inpatient dialysis tomorrow per Nephrology and that his outpatient dialysis clinic will need to be updated on his current HD needs. Patient reports understanding. He has no complaints at this time, denying HA, nausea, vomiting, chest pain, worsening SOB, diarrhea, or constipation.    Review of Systems   Constitutional:  Negative for activity change, chills and fever.   HENT:  Positive for facial swelling (periorbital, perioral). Negative for congestion, drooling and trouble swallowing.    Eyes:  Negative for photophobia and visual disturbance.   Respiratory:  Positive for shortness of breath (improved). Negative for cough, chest tightness and wheezing.         Orthopnea    Cardiovascular:  Positive for leg swelling. Negative for chest pain and palpitations.   Gastrointestinal:  Negative for abdominal distention, abdominal pain, constipation, diarrhea, nausea and vomiting.   Endocrine: Negative for cold intolerance and heat intolerance.   Genitourinary:  Negative for difficulty urinating, dysuria, frequency, hematuria and urgency.   Musculoskeletal:  Negative for arthralgias, back pain and gait problem.   Skin:  Negative for color  change, pallor and rash.   Allergic/Immunologic: Negative for immunocompromised state.   Neurological:  Negative for dizziness, syncope, weakness, light-headedness, numbness and headaches.   Psychiatric/Behavioral:  Positive for confusion (resolved). Negative for agitation and behavioral problems. The patient is not nervous/anxious.      Objective:     Vital Signs (Most Recent):  Temp: 97.4 °F (36.3 °C) (11/27/22 1131)  Pulse: 69 (11/27/22 1131)  Resp: 20 (11/27/22 1131)  BP: 135/72 (11/27/22 1131)  SpO2: (!) 92 % (11/27/22 1131)   Vital Signs (24h Range):  Temp:  [97.4 °F (36.3 °C)-98.4 °F (36.9 °C)] 97.4 °F (36.3 °C)  Pulse:  [68-81] 69  Resp:  [16-20] 20  SpO2:  [90 %-96 %] 92 %  BP: (121-150)/(57-78) 135/72     Weight: 84.8 kg (187 lb)  Body mass index is 27.62 kg/m².    Intake/Output Summary (Last 24 hours) at 11/27/2022 1316  Last data filed at 11/26/2022 1514  Gross per 24 hour   Intake 200 ml   Output --   Net 200 ml      Physical Exam  Vitals and nursing note reviewed.   Constitutional:       General: He is not in acute distress.     Appearance: He is well-developed.   HENT:      Head: Normocephalic and atraumatic.      Comments: Periorbital and perioral facial swelling      Mouth/Throat:      Pharynx: No oropharyngeal exudate.   Eyes:      Conjunctiva/sclera: Conjunctivae normal.      Pupils: Pupils are equal, round, and reactive to light.   Cardiovascular:      Rate and Rhythm: Normal rate and regular rhythm.      Heart sounds: Normal heart sounds.      Arteriovenous access: Left arteriovenous access is present.  Pulmonary:      Effort: Pulmonary effort is normal. No respiratory distress.      Breath sounds: No stridor. Rales (diffuse coarse rales througout lung fields) present. No wheezing or rhonchi.      Comments: on 3 L NC  Abdominal:      General: Bowel sounds are normal. There is no distension.      Palpations: Abdomen is soft.      Tenderness: There is no abdominal tenderness.   Musculoskeletal:          General: No tenderness. Normal range of motion.      Cervical back: Normal range of motion and neck supple.      Right lower le+ Pitting Edema present.      Left lower le+ Pitting Edema present.   Lymphadenopathy:      Cervical: No cervical adenopathy.   Skin:     General: Skin is warm and dry.      Capillary Refill: Capillary refill takes less than 2 seconds.      Findings: No rash.   Neurological:      Mental Status: He is alert and oriented to person, place, and time.      Cranial Nerves: No cranial nerve deficit.      Sensory: No sensory deficit.      Coordination: Coordination normal.   Psychiatric:         Behavior: Behavior normal.         Thought Content: Thought content normal.         Judgment: Judgment normal.       Significant Labs: All pertinent labs within the past 24 hours have been reviewed.    Significant Imaging: I have reviewed all pertinent imaging results/findings within the past 24 hours.      Assessment/Plan:      * Acute HFrEF (heart failure with reduced ejection fraction)  - CHF is currently uncontrolled due to Continued edema of extremities and hypervolemia in the setting of renal failure  - Latest echo 10/12/2022: EF 40%, (+)DD, CVP 15 mmHg, PAP 65mmHg  - Continue Beta Blocker    - home diuretic: n/a may benefit from Lasix    - hospital diuresis: s/p Lasix 100 mg on    - carvediloL (COREG) 3.125 MG tablet BID   - Place on fluid restriction of 1.5 L. Continue to stress to patient importance of self efficacy and  on diet for CHF.   - Monitor clinical status closely. Monitor on telemetry.   - Monitor strict Is&Os and daily weights.    - Last BNP reviewed- and noted below    - Repeat BNP below  - CXR showing diffuse accentuation interstitial markings possibly infiltrates and/or interstitial edema mostly at the lung bases.  Small amount of fluid in the right interlobar fissure and slightly blunted costophrenic angles noted posteriorly  - Echo with EF of 63%, grade II  left ventricular diastolic dysfunction, CVP of 15 mmHg, and moderate-severe pulmonary hypertension.   Recent Labs   Lab 11/23/22  0858   BNP 3,024*       ESRD on dialysis  Chronic kidney disease-mineral and bone disorder  MWF schedule  Residual renal function?- Yes  - Continues to be overloaded on exam. Per nephro, he needs longer sessions/increased fluid removal at outpatient dialysis sessions. Will contact outpatient HD to discuss  - Nephrology consulted, appreciate recs   - Improvement in O2 requirements, plan for next HD 11/28 for volume management and metabolic clearance   - Possible UF only treatment on 11/27   - Obtain standing weight prior to HD and after to determine new target weight    - 1.5 fluid restrictions    - Strict I/O's and daily weights   - Daily renal function panels    - Renally dose meds   - No lab stick/BP intake on access site   - Renal diet with protein intake goal 1.5 g/kg/d   - Novasource with meals    - Phos 2.0. Please hold binders.   - Holding calcium acetate,phosphat bind, (PHOSLO) 667 mg capsule TID per Nephro  - Monitor daily electrolytes and defer dialysis orders to nephrology  - Labs pending    Chronic hypoxemic respiratory failure  Patient with Hypoxic Respiratory failure which is Chronic.  he is on home oxygen at 2-3 LPM. Supplemental oxygen was provided and noted. Labs and images were reviewed. Patient Has not had a recent ABG. Will treat underlying causes and adjust management of respiratory failure as follows-   - HD for volume removal  - fluticasone-salmeterol diskus inhaler 250-50 mcg BID  - tiotropium bromide (SPIRIVA RESPIMAT) 2.5 mcg/actuation inhaler daily  - DuoNebs PRN    Hypertensive urgency  Renovascular Hypertension  Patient has a current diagnosis of hypertensive urgency (without evidence of end organ damage) which is uncontrolled.  Latest blood pressure and vitals reviewed-   Temp:  [97.4 °F (36.3 °C)-98.4 °F (36.9 °C)]   Pulse:  [68-81]   Resp:  [16-20]   BP:  (121-150)/(57-78)   SpO2:  [90 %-96 %] .   - Patient currently off IV antihypertensives.   - Home meds for hypertension were reviewed.   - Medication adjustment for hospital antihypertensives is as follows:   - Nifedipine to 90 mg qD   - Carvedilol 6.25 mg BID   - Clonidine patch every Monday   - Imdur 30 mg BID   - Hydralazine 100 mg TID  - Will continue to monitor vital signs.   - Will aim for controlled BP reduction by medications noted above. Monitor and mitigate end organ damage as indicated.    Agitation  - agitated and confused overnight on 11/19   - improved  - s/p oral depakote x1 with improvement   - continue to monitor    HLD (hyperlipidemia)  - Continue statin    Anemia in ESRD (end-stage renal disease)  - Current CBC reviewed-   Lab Results   Component Value Date    HGB 8.9 (L) 11/25/2022    HCT 28.1 (L) 11/25/2022     - Patient's anemia is currently controlled.   - Etiology likely d/t anemia of chronic disease, iron deficiency   - Monitor serial CBC and transfuse if patient becomes hemodynamically unstable, symptomatic or H/H drops below 7/21.     Type 1 diabetes mellitus with chronic kidney disease on chronic dialysis  Patient's FSGs are uncontrolled due to hyperglycemia on current medication regimen.  Last A1c reviewed-   Lab Results   Component Value Date    HGBA1C 11.9 (H) 10/12/2022     Most recent fingerstick glucose reviewed-   Recent Labs   Lab 11/26/22  1936 11/26/22  2333 11/27/22  0825 11/27/22  1208   POCTGLUCOSE 348* 300* 141* 222*     Current correctional scale  Medium  Maintain anti-hyperglycemic dose as follows-   Antihyperglycemics (From admission, onward)    Start     Stop Route Frequency Ordered    11/26/22 2100  insulin detemir U-100 pen 8 Units         -- SubQ 2 times daily 11/26/22 1836    11/26/22 1645  insulin aspart U-100 pen 6 Units         -- SubQ 3 times daily with meals 11/26/22 1421    11/20/22 1820  insulin aspart U-100 pen 0-5 Units         -- SubQ Before meals & nightly  PRN 11/20/22 1721        - Holding oral hypoglycemics during admission  - Increased insulin regimen as shown above    Cellulitis of left lower extremity  - chronic changes to bilateral lower extremities  - no active infection observed on exam  - Lower extremity edema L>R  - US BLE negative for DVT      VTE Risk Mitigation (From admission, onward)         Ordered     apixaban tablet 5 mg  2 times daily         11/23/22 1133     IP VTE HIGH RISK PATIENT  Once         11/20/22 1721     Place sequential compression device  Until discontinued         11/20/22 1721                Discharge Planning   GERA: 11/27/2022     Code Status: Full Code   Is the patient medically ready for discharge?: No    Reason for patient still in hospital (select all that apply): Patient trending condition, Treatment and Consult recommendations  Discharge Plan A: Return to nursing home                  Landon Darby PA-C  Department of Hospital Medicine   [facility]

## 2022-11-27 NOTE — ASSESSMENT & PLAN NOTE
Patient with Hypoxic Respiratory failure which is Chronic.  he is on home oxygen at 2-3 LPM. Supplemental oxygen was provided and noted. Labs and images were reviewed. Patient Has not had a recent ABG. Will treat underlying causes and adjust management of respiratory failure as follows-   - HD for volume removal  - fluticasone-salmeterol diskus inhaler 250-50 mcg BID  - tiotropium bromide (SPIRIVA RESPIMAT) 2.5 mcg/actuation inhaler daily  - Val PRN

## 2022-11-27 NOTE — PROGRESS NOTES
[facility] - Observation [address] [place] [address] Medicine  Progress Note    Patient Name: Cosme Lewis  MRN: [id number]  Patient Class: IP- Inpatient   Admission Date: 11/18/2022  Length of Stay: 6 days  Attending Physician: Jose Daly MD  Primary Care Provider: Primary Doctor No        Subjective:     Principal Problem:Acute HFrEF (heart failure with reduced ejection fraction)        HPI:  Cosme Lewis is a 57 y.o. 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 Per ED MD, the facility believed he appeared more overloaded than normal and therefore sent him to the ED. The patient reports he was at his mother's home before being picked up by transportation. He states he passed out in while in transport and never arrived at the facility, he does not seem like the most reliable historian. He reports using supplemental oxygen at baseline, unsure of how many liters. He denies chest pain, worsening shortness of breath, diarrhea, abdominal pain, cough or sore throat. He has no awareness of his medications. He states he lives in a facility for people who are 55 and older. He denies tobacco and alcohol use.     ED: afebrile without leukocytosis.nephrology was consulted for HD. VSS. BNP 2,255. Troponin 0.035. admitted to hospital medicine for further management.        Overview/Hospital Course:  Cosme Lewis was admitted to hospital medicine for management of volume overload in the setting of missed HD. Nephro consulted for HD management. Patient with confusion and agitation overnight, pulled out IV and pulled off telemetry. Given oral Depakote for non-redirectable agitation in setting of prolonged QT. Continued to appear fluid overloaded on exam, required several episodes of HD. Increased Nifedipine to 90 mg qD due to hypertension. Increased prandial and long acting insulin regimen  due to continued hyperglycemia. Holding phosphate binder per Nephrology. Resumed home Hydralazine. Echo with EF of 63%, grade II left ventricular diastolic dysfunction, CVP of 15 mmHg, and moderate-severe pulmonary hypertension. Persistently volume overloaded even with several serial HD sessions. Nephrology consulted, recommended repeat CXR to determine if pleural effusions are improving or worsening. CXR with a small amount of fluid in the right interlobar fissure and slightly blunted costophrenic angles noted posteriorly. Slightly improved aeration of the lungs as compared to the previous study.      Interval History: Overnight, patient hypoglycemic to 48, improved with oral glucose. Levemir was held, meal time and long acting decreased. However, blood glucose uncontrolled today. Increased regimen. He also reported to be very SOB, increased to 6L NC and is s/p breathing treatment and IV Lasix 80 mg. Upon my exam this morning, patient reports improvement in SOB, requiring 2.5 L NC. Patient was dialyzed today, removing 3 L. Per Nephrology, his outpatient dialysis unit has target weight at 79 kg, which needs to be adjusted. It seems that he may need longer treatment times as outpatient to pull more fluid off. Patient without complaints today other than wanting to return to NH.    Review of Systems   Constitutional:  Negative for activity change, chills and fever.   HENT:  Positive for facial swelling (periorbital, perioral). Negative for congestion, drooling and trouble swallowing.    Eyes:  Negative for photophobia and visual disturbance.   Respiratory:  Positive for shortness of breath (improved). Negative for cough, chest tightness and wheezing.         Orthopnea    Cardiovascular:  Positive for leg swelling. Negative for chest pain and palpitations.   Gastrointestinal:  Negative for abdominal distention, abdominal pain, constipation, diarrhea, nausea and vomiting.   Endocrine: Negative for cold intolerance and  heat intolerance.   Genitourinary:  Negative for difficulty urinating, dysuria, frequency, hematuria and urgency.   Musculoskeletal:  Negative for arthralgias, back pain and gait problem.   Skin:  Negative for color change, pallor and rash.   Allergic/Immunologic: Negative for immunocompromised state.   Neurological:  Negative for dizziness, syncope, weakness, light-headedness, numbness and headaches.   Psychiatric/Behavioral:  Positive for confusion (resolved). Negative for agitation and behavioral problems. The patient is not nervous/anxious.      Objective:     Vital Signs (Most Recent):  Temp: 98 °F (36.7 °C) (11/26/22 0630)  Pulse: 86 (11/26/22 0630)  Resp: 20 (11/26/22 0030)  BP: (!) 154/75 (11/26/22 0630)  SpO2: 95 % (11/26/22 0030)   Vital Signs (24h Range):  Temp:  [97 °F (36.1 °C)-98 °F (36.7 °C)] 98 °F (36.7 °C)  Pulse:  [68-86] 86  Resp:  [16-20] 20  SpO2:  [91 %-98 %] 95 %  BP: (117-184)/(61-88) 154/75     Weight: 84.8 kg (187 lb)  Body mass index is 27.62 kg/m².    Intake/Output Summary (Last 24 hours) at 11/26/2022 0738  Last data filed at 11/26/2022 0630  Gross per 24 hour   Intake 676 ml   Output 3500 ml   Net -2824 ml      Physical Exam  Vitals and nursing note reviewed.   Constitutional:       General: He is not in acute distress.     Appearance: He is well-developed.   HENT:      Head: Normocephalic and atraumatic.      Comments: Periorbital and perioral facial swelling      Mouth/Throat:      Pharynx: No oropharyngeal exudate.   Eyes:      Conjunctiva/sclera: Conjunctivae normal.      Pupils: Pupils are equal, round, and reactive to light.   Cardiovascular:      Rate and Rhythm: Normal rate and regular rhythm.      Heart sounds: Normal heart sounds.      Arteriovenous access: Left arteriovenous access is present.  Pulmonary:      Effort: Pulmonary effort is normal. No respiratory distress.      Breath sounds: No stridor. Rales (diffuse coarse rales througout lung fields) present. No wheezing or  rhonchi.      Comments: Work of breathing improved, on 2.5 L NC  Abdominal:      General: Bowel sounds are normal. There is no distension.      Palpations: Abdomen is soft.      Tenderness: There is no abdominal tenderness.   Musculoskeletal:         General: No tenderness. Normal range of motion.      Cervical back: Normal range of motion and neck supple.      Right lower le+ Pitting Edema present.      Left lower le+ Pitting Edema present.   Lymphadenopathy:      Cervical: No cervical adenopathy.   Skin:     General: Skin is warm and dry.      Capillary Refill: Capillary refill takes less than 2 seconds.      Findings: No rash.   Neurological:      Mental Status: He is alert and oriented to person, place, and time.      Cranial Nerves: No cranial nerve deficit.      Sensory: No sensory deficit.      Coordination: Coordination normal.   Psychiatric:         Behavior: Behavior normal.         Thought Content: Thought content normal.         Judgment: Judgment normal.       Significant Labs: All pertinent labs within the past 24 hours have been reviewed.    Significant Imaging: I have reviewed all pertinent imaging results/findings within the past 24 hours.      Assessment/Plan:      * Acute HFrEF (heart failure with reduced ejection fraction)  - CHF is currently uncontrolled due to Continued edema of extremities and hypervolemia in the setting of renal failure  - Latest echo 10/12/2022: EF 40%, (+)DD, CVP 15 mmHg, PAP 65mmHg  - Continue Beta Blocker    - home diuretic: n/a may benefit from Lasix    - hospital diuresis: s/p Lasix 100 mg on    - carvediloL (COREG) 3.125 MG tablet BID   - Place on fluid restriction of 1.5 L. Continue to stress to patient importance of self efficacy and  on diet for CHF.   - Monitor clinical status closely. Monitor on telemetry.   - Monitor strict Is&Os and daily weights.    - Last BNP reviewed- and noted below    - Repeat BNP below  - CXR showing diffuse  accentuation interstitial markings possibly infiltrates and/or interstitial edema mostly at the lung bases.  Small amount of fluid in the right interlobar fissure and slightly blunted costophrenic angles noted posteriorly  - Echo with EF of 63%, grade II left ventricular diastolic dysfunction, CVP of 15 mmHg, and moderate-severe pulmonary hypertension.   Recent Labs   Lab 11/23/22  0858   BNP 3,024*       ESRD on dialysis  Chronic kidney disease-mineral and bone disorder  MWF schedule  Residual renal function?- Yes  - Continues to be overloaded on exam. Per nephro, he needs longer sessions/increased fluid removal at outpatient dialysis sessions. Will contact outpatient HD to discuss  - Nephrology consulted, appreciate recs   - Improvement in O2 requirements, plan for next HD 11/28 for volume management and metabolic clearance   - Possible UF only treatment on 11/27   - Obtain standing weight prior to HD and after to determine new target weight    - 1.5 fluid restrictions    - Strict I/O's and daily weights   - Daily renal function panels    - Renally dose meds   - No lab stick/BP intake on access site   - Renal diet with protein intake goal 1.5 g/kg/d   - Novasource with meals    - Phos 2.0. Please hold binders.   - Holding calcium acetate,phosphat bind, (PHOSLO) 667 mg capsule TID per Nephro  - Monitor daily electrolytes and defer dialysis orders to nephrology    Chronic hypoxemic respiratory failure  Patient with Hypoxic Respiratory failure which is Chronic.  he is on home oxygen at 2-3 LPM. Supplemental oxygen was provided and noted. Labs and images were reviewed. Patient Has not had a recent ABG. Will treat underlying causes and adjust management of respiratory failure as follows-   - HD for volume removal; removed 6L so far  - HD today, removed 4350 mL  - fluticasone-salmeterol diskus inhaler 250-50 mcg BID  - tiotropium bromide (SPIRIVA RESPIMAT) 2.5 mcg/actuation inhaler daily  - DuoNebs PRN    Hypertensive  urgency  Renovascular Hypertension  Patient has a current diagnosis of hypertensive urgency (without evidence of end organ damage) which is uncontrolled.  Latest blood pressure and vitals reviewed-   Temp:  [97 °F (36.1 °C)-98.6 °F (37 °C)]   Pulse:  [68-86]   Resp:  [16-20]   BP: (117-170)/(57-83)   SpO2:  [93 %-97 %] .   - Patient currently off IV antihypertensives.   - Home meds for hypertension were reviewed.   - Medication adjustment for hospital antihypertensives is as follows:   - Nifedipine to 90 mg qD   - Carvedilol 6.25 mg BID   - Clonidine patch every Monday   - Imdur 30 mg BID   - Hydralazine 100 mg TID  - Will continue to monitor vital signs.   - Will aim for controlled BP reduction by medications noted above. Monitor and mitigate end organ damage as indicated.    Agitation  - agitated and confused overnight on 11/19   - improved  - s/p oral depakote x1 with improvement   - continue to monitor    HLD (hyperlipidemia)  - Continue statin    Anemia in ESRD (end-stage renal disease)  - Current CBC reviewed-   Lab Results   Component Value Date    HGB 8.9 (L) 11/25/2022    HCT 28.1 (L) 11/25/2022     - Patient's anemia is currently controlled.   - Etiology likely d/t anemia of chronic disease, iron deficiency   - Monitor serial CBC and transfuse if patient becomes hemodynamically unstable, symptomatic or H/H drops below 7/21.     Type 1 diabetes mellitus with chronic kidney disease on chronic dialysis  Patient's FSGs are uncontrolled due to hyperglycemia on current medication regimen.  Last A1c reviewed-   Lab Results   Component Value Date    HGBA1C 11.9 (H) 10/12/2022     Most recent fingerstick glucose reviewed-   Recent Labs   Lab 11/25/22  2332 11/26/22  0818 11/26/22  1130 11/26/22  1649   POCTGLUCOSE 112* 196* 259* 285*     Current correctional scale  Medium  Maintain anti-hyperglycemic dose as follows-   Antihyperglycemics (From admission, onward)    Start     Stop Route Frequency Ordered    11/26/22  2100  insulin detemir U-100 pen 8 Units         -- SubQ 2 times daily 11/26/22 1836    11/26/22 1645  insulin aspart U-100 pen 6 Units         -- SubQ 3 times daily with meals 11/26/22 1421    11/20/22 1820  insulin aspart U-100 pen 0-5 Units         -- SubQ Before meals & nightly PRN 11/20/22 1721        - Holding oral hypoglycemics during admission  - Increased insulin regimen as shown above    Cellulitis of left lower extremity  - chronic changes to bilateral lower extremities  - no active infection observed on exam  - Lower extremity edema L>R  - US BLE negative for DVT      VTE Risk Mitigation (From admission, onward)         Ordered     apixaban tablet 5 mg  2 times daily         11/23/22 1133     IP VTE HIGH RISK PATIENT  Once         11/20/22 1721     Place sequential compression device  Until discontinued         11/20/22 1721                Discharge Planning   GERA: 11/27/2022     Code Status: Full Code   Is the patient medically ready for discharge?: No    Reason for patient still in hospital (select all that apply): Patient trending condition, Treatment and Consult recommendations  Discharge Plan A: Return to nursing home                  Landon Darby PA-C  Department of Hospital Medicine   [facility] - [facility] 11H

## 2022-11-27 NOTE — ASSESSMENT & PLAN NOTE
Chronic kidney disease-mineral and bone disorder  MWF schedule  Residual renal function?- Yes  - Continues to be overloaded on exam. Per nephro, he needs longer sessions/increased fluid removal at outpatient dialysis sessions. Will contact outpatient HD to discuss  - Nephrology consulted, appreciate recs   - Improvement in 02 requirements, plan for next HD 11/28 for volume management and metabolic clearance   - Possible UF only treatment on 11/27   - Obtain standing weight prior to HD and after to determine new target weight    - 1.5 fluid restrictions    - Strict I/O's and daily weights   - Daily renal function panels    - Renally dose meds   - No lab stick/BP intake on access site   - Renal diet with protein intake goal 1.5 g/kg/d   - Novasource with meals    - Phos 2.0. Please hold binders.   - Holding calcium acetate,phosphat bind, (PHOSLO) 667 mg capsule TID per Nephro  - Monitor daily electrolytes and defer dialysis orders to nephrology

## 2022-11-27 NOTE — PLAN OF CARE
Pt alert, stable; scheduled meds given without difficulty; pt rested well overnight, no distress noted; safety maintained.      Problem: Device-Related Complication Risk (Hemodialysis)  Goal: Safe, Effective Therapy Delivery  Outcome: Ongoing, Progressing     Problem: Hemodynamic Instability (Hemodialysis)  Goal: Effective Tissue Perfusion  Outcome: Ongoing, Progressing     Problem: Infection (Hemodialysis)  Goal: Absence of Infection Signs and Symptoms  Outcome: Ongoing, Progressing     Problem: Adult Inpatient Plan of Care  Goal: Plan of Care Review  Outcome: Ongoing, Progressing  Goal: Patient-Specific Goal (Individualized)  Outcome: Ongoing, Progressing  Goal: Absence of Hospital-Acquired Illness or Injury  Outcome: Ongoing, Progressing  Goal: Optimal Comfort and Wellbeing  Outcome: Ongoing, Progressing  Goal: Readiness for Transition of Care  Outcome: Ongoing, Progressing     Problem: Diabetes Comorbidity  Goal: Blood Glucose Level Within Targeted Range  Outcome: Ongoing, Progressing     Problem: Electrolyte Imbalance (Chronic Kidney Disease)  Goal: Electrolyte Balance  Outcome: Ongoing, Progressing     Problem: Fluid Volume Excess (Chronic Kidney Disease)  Goal: Fluid Balance  Outcome: Ongoing, Progressing     Problem: Skin Injury Risk Increased  Goal: Skin Health and Integrity  Outcome: Ongoing, Progressing     Problem: Infection  Goal: Absence of Infection Signs and Symptoms  Outcome: Ongoing, Progressing

## 2022-11-27 NOTE — ASSESSMENT & PLAN NOTE
Chronic kidney disease-mineral and bone disorder  MWF schedule  Residual renal function?- Yes  - Continues to be overloaded on exam. Per nephro, he needs longer sessions/increased fluid removal at outpatient dialysis sessions. Will contact outpatient HD to discuss  - Nephrology consulted, appreciate recs   - Improvement in O2 requirements, plan for next HD 11/28 for volume management and metabolic clearance   - Possible UF only treatment on 11/27   - Obtain standing weight prior to HD and after to determine new target weight    - 1.5 fluid restrictions    - Strict I/O's and daily weights   - Daily renal function panels    - Renally dose meds   - No lab stick/BP intake on access site   - Renal diet with protein intake goal 1.5 g/kg/d   - Novasource with meals    - Phos 2.0. Please hold binders.   - Holding calcium acetate,phosphat bind, (PHOSLO) 667 mg capsule TID per Nephro  - Monitor daily electrolytes and defer dialysis orders to nephrology  - Labs pending

## 2022-11-28 NOTE — PROGRESS NOTES
[facility] H[facility]ODIALYSIS NOTE    Cosme Lewis is a 57 y.o. male currently on hemodialysis for removal of uremic toxins and volume.     Patient seen and evaluated on hemodialysis, tolerating treatment, see HD flowsheet for vitals and assessments.    No Hypotension, chest pain, shortness of breath, cramping, nausea or vomiting.      Labs have been reviewed and the dialysate bath has been adjusted.     Labs:     Recent Labs   Lab 11/23/22  0858 11/25/22 0226 11/27/22  1726    134* 135*   K 4.4 4.5 4.4    102 101   CO2 23 20* 23   BUN 38* 36* 46*   CREATININE 5.0* 4.6* 5.4*   CALCIUM 8.2* 8.0* 8.0*   PHOS 1.8* 2.0* 3.4     Recent Labs   Lab 11/23/22  0858 11/25/22 0226 11/27/22  1726   WBC 4.72 5.43 4.87   HGB 9.3* 8.9* 9.3*   HCT 29.9* 28.1* 29.9*    219 239     No results found for: FESATURATED, FERRITIN     Assessment/Plan      Ultrafiltration goal: Liters: 3L. Maintain MAP > 65 mmHg.    Duration: 3 hours    - Seen on dialysis today, tolerating session with current UFR, no complications.    - No distress on exam. Appears comfortable 2-3 L NC. Oxygen saturations 96%. Denies c/o SOB.   - No lab stick/BP intake on access site  - Continue to monitor intake and output, daily weights   - Please avoid gadolinium, fleets, phos-based laxatives, NSAIDs  - Will follow closely and continue dialysis treatments while in-patient    Anemia  - Hgb 9.3.   - No Epogen.     BMM  - Renal diet with protein intake goal 1.5 g/kg/d  - Novasource with meals   - F/U PO4, Mg, Calcium. And albumin levels.   - Phos 3.4.    HTN  - BP Elevated  - Goal for BP <140mmHg SBP and <90 mmHg DBP.   - Continue home antihypertensive regimen; adjust as needed.       Rosi Grant, KUMAR, APRN, FNP-C  Nephrology Department  Pager:  [phone]

## 2022-11-28 NOTE — SUBJECTIVE & OBJECTIVE
Interval History: NINO MEJIA. Patient on 2 L NC. Increased Insulin regimen due to hyperglycemia. Refusing dialysis this morning until he eats breakfast. Patient now scheduled for second shift of HD today. Because of this, he won't complete HD in time for transportation back to [facility]. Patient reports he's upset that he's still in the hospital. Discussed plan of care with him again. Patient without any physical complaints today including HA, nausea, vomiting, SOB, abdominal pain, diarrhea, constipation.    Review of Systems   Constitutional:  Negative for activity change, chills and fever.   HENT:  Positive for facial swelling (periorbital, perioral). Negative for congestion, drooling and trouble swallowing.    Eyes:  Negative for photophobia and visual disturbance.   Respiratory:  Positive for shortness of breath (improved). Negative for cough, chest tightness and wheezing.         Orthopnea    Cardiovascular:  Positive for leg swelling. Negative for chest pain and palpitations.   Gastrointestinal:  Negative for abdominal distention, abdominal pain, constipation, diarrhea, nausea and vomiting.   Endocrine: Negative for cold intolerance and heat intolerance.   Genitourinary:  Negative for difficulty urinating, dysuria, frequency, hematuria and urgency.   Musculoskeletal:  Negative for arthralgias, back pain and gait problem.   Skin:  Negative for color change, pallor and rash.   Allergic/Immunologic: Negative for immunocompromised state.   Neurological:  Negative for dizziness, syncope, weakness, light-headedness, numbness and headaches.   Psychiatric/Behavioral:  Positive for confusion (resolved). Negative for agitation and behavioral problems. The patient is not nervous/anxious.      Objective:     Vital Signs (Most Recent):  Temp: 98.1 °F (36.7 °C) (11/28/22 1105)  Pulse: 82 (11/28/22 1630)  Resp: 18 (11/28/22 1400)  BP: (!) 167/90 (11/28/22 1630)  SpO2: (!) 92 % (11/28/22 1105)   Vital Signs (24h Range):  Temp:   [97.5 °F (36.4 °C)-98.5 °F (36.9 °C)] 98.1 °F (36.7 °C)  Pulse:  [71-82] 82  Resp:  [18] 18  SpO2:  [88 %-93 %] 92 %  BP: (125-173)/(60-90) 167/90     Weight: 84.8 kg (187 lb)  Body mass index is 27.62 kg/m².    Intake/Output Summary (Last 24 hours) at 2022 1637  Last data filed at 2022 1819  Gross per 24 hour   Intake 240 ml   Output --   Net 240 ml      Physical Exam  Vitals and nursing note reviewed.   Constitutional:       General: He is not in acute distress.     Appearance: He is well-developed.   HENT:      Head: Normocephalic and atraumatic.      Comments: Periorbital and perioral facial swelling worsened     Mouth/Throat:      Pharynx: No oropharyngeal exudate.   Eyes:      Conjunctiva/sclera: Conjunctivae normal.      Pupils: Pupils are equal, round, and reactive to light.   Cardiovascular:      Rate and Rhythm: Normal rate and regular rhythm.      Heart sounds: Normal heart sounds.      Arteriovenous access: Left arteriovenous access is present.  Pulmonary:      Effort: Pulmonary effort is normal. No respiratory distress.      Breath sounds: No stridor. Rales (diffuse coarse rales througout lung fields) present. No wheezing or rhonchi.      Comments: on 2 L NC  Abdominal:      General: Bowel sounds are normal. There is no distension.      Palpations: Abdomen is soft.      Tenderness: There is no abdominal tenderness.   Musculoskeletal:         General: No tenderness. Normal range of motion.      Cervical back: Normal range of motion and neck supple.      Right lower le+ Pitting Edema present.      Left lower le+ Pitting Edema present.   Lymphadenopathy:      Cervical: No cervical adenopathy.   Skin:     General: Skin is warm and dry.      Capillary Refill: Capillary refill takes less than 2 seconds.      Findings: No rash.   Neurological:      Mental Status: He is alert and oriented to person, place, and time.      Cranial Nerves: No cranial nerve deficit.      Sensory: No sensory  deficit.      Coordination: Coordination normal.   Psychiatric:         Behavior: Behavior normal.         Thought Content: Thought content normal.         Judgment: Judgment normal.       Significant Labs: All pertinent labs within the past 24 hours have been reviewed.    Significant Imaging: I have reviewed all pertinent imaging results/findings within the past 24 hours.

## 2022-11-28 NOTE — PROGRESS NOTES
[facility] - Observation [address] [place] [address] Medicine  Progress Note    Patient Name: Cosme Lewis  MRN: [id number]  Patient Class: IP- Inpatient   Admission Date: 11/18/2022  Length of Stay: 8 days  Attending Physician: Jose Daly MD  Primary Care Provider: Primary Doctor No        Subjective:     Principal Problem:Acute HFrEF (heart failure with reduced ejection fraction)        HPI:  Cosme Lewis is a 57 y.o. 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 Per ED MD, the facility believed he appeared more overloaded than normal and therefore sent him to the ED. The patient reports he was at his mother's home before being picked up by transportation. He states he passed out in while in transport and never arrived at the facility, he does not seem like the most reliable historian. He reports using supplemental oxygen at baseline, unsure of how many liters. He denies chest pain, worsening shortness of breath, diarrhea, abdominal pain, cough or sore throat. He has no awareness of his medications. He states he lives in a facility for people who are 55 and older. He denies tobacco and alcohol use.     ED: afebrile without leukocytosis.nephrology was consulted for HD. VSS. BNP 2,255. Troponin 0.035. admitted to hospital medicine for further management.        Overview/Hospital Course:  Cosme Lewis was admitted to hospital medicine for management of volume overload in the setting of missed HD. Nephro consulted for HD management. Patient with confusion and agitation overnight, pulled out IV and pulled off telemetry. Given oral Depakote for non-redirectable agitation in setting of prolonged QT. Continued to appear fluid overloaded on exam, required several episodes of HD. Increased Nifedipine to 90 mg qD due to hypertension. Increased prandial and long acting insulin regimen  due to continued hyperglycemia. Holding phosphate binder per Nephrology. Resumed home Hydralazine. Echo with EF of 63%, grade II left ventricular diastolic dysfunction, CVP of 15 mmHg, and moderate-severe pulmonary hypertension. Persistently volume overloaded even with several serial HD sessions. Nephrology consulted, recommended repeat CXR to determine if pleural effusions are improving or worsening. CXR with a small amount of fluid in the right interlobar fissure and slightly blunted costophrenic angles noted posteriorly. Slightly improved aeration of the lungs as compared to the previous study.      Interval History: NINO MEJIA. Patient on 2 L NC. Increased Insulin regimen due to hyperglycemia. Refusing dialysis this morning until he eats breakfast. Patient now scheduled for second shift of HD today. Because of this, he won't complete HD in time for transportation back to [facility]. Patient reports he's upset that he's still in the hospital. Discussed plan of care with him again. Patient without any physical complaints today including HA, nausea, vomiting, SOB, abdominal pain, diarrhea, constipation.    Review of Systems   Constitutional:  Negative for activity change, chills and fever.   HENT:  Positive for facial swelling (periorbital, perioral). Negative for congestion, drooling and trouble swallowing.    Eyes:  Negative for photophobia and visual disturbance.   Respiratory:  Positive for shortness of breath (improved). Negative for cough, chest tightness and wheezing.         Orthopnea    Cardiovascular:  Positive for leg swelling. Negative for chest pain and palpitations.   Gastrointestinal:  Negative for abdominal distention, abdominal pain, constipation, diarrhea, nausea and vomiting.   Endocrine: Negative for cold intolerance and heat intolerance.   Genitourinary:  Negative for difficulty urinating, dysuria, frequency, hematuria and urgency.   Musculoskeletal:  Negative for arthralgias, back pain and gait  problem.   Skin:  Negative for color change, pallor and rash.   Allergic/Immunologic: Negative for immunocompromised state.   Neurological:  Negative for dizziness, syncope, weakness, light-headedness, numbness and headaches.   Psychiatric/Behavioral:  Positive for confusion (resolved). Negative for agitation and behavioral problems. The patient is not nervous/anxious.      Objective:     Vital Signs (Most Recent):  Temp: 98.1 °F (36.7 °C) (11/28/22 1105)  Pulse: 82 (11/28/22 1630)  Resp: 18 (11/28/22 1400)  BP: (!) 167/90 (11/28/22 1630)  SpO2: (!) 92 % (11/28/22 1105)   Vital Signs (24h Range):  Temp:  [97.5 °F (36.4 °C)-98.5 °F (36.9 °C)] 98.1 °F (36.7 °C)  Pulse:  [71-82] 82  Resp:  [18] 18  SpO2:  [88 %-93 %] 92 %  BP: (125-173)/(60-90) 167/90     Weight: 84.8 kg (187 lb)  Body mass index is 27.62 kg/m².    Intake/Output Summary (Last 24 hours) at 11/28/2022 1637  Last data filed at 11/27/2022 1819  Gross per 24 hour   Intake 240 ml   Output --   Net 240 ml      Physical Exam  Vitals and nursing note reviewed.   Constitutional:       General: He is not in acute distress.     Appearance: He is well-developed.   HENT:      Head: Normocephalic and atraumatic.      Comments: Periorbital and perioral facial swelling worsened     Mouth/Throat:      Pharynx: No oropharyngeal exudate.   Eyes:      Conjunctiva/sclera: Conjunctivae normal.      Pupils: Pupils are equal, round, and reactive to light.   Cardiovascular:      Rate and Rhythm: Normal rate and regular rhythm.      Heart sounds: Normal heart sounds.      Arteriovenous access: Left arteriovenous access is present.  Pulmonary:      Effort: Pulmonary effort is normal. No respiratory distress.      Breath sounds: No stridor. Rales (diffuse coarse rales througout lung fields) present. No wheezing or rhonchi.      Comments: on 2 L NC  Abdominal:      General: Bowel sounds are normal. There is no distension.      Palpations: Abdomen is soft.      Tenderness: There is  no abdominal tenderness.   Musculoskeletal:         General: No tenderness. Normal range of motion.      Cervical back: Normal range of motion and neck supple.      Right lower le+ Pitting Edema present.      Left lower le+ Pitting Edema present.   Lymphadenopathy:      Cervical: No cervical adenopathy.   Skin:     General: Skin is warm and dry.      Capillary Refill: Capillary refill takes less than 2 seconds.      Findings: No rash.   Neurological:      Mental Status: He is alert and oriented to person, place, and time.      Cranial Nerves: No cranial nerve deficit.      Sensory: No sensory deficit.      Coordination: Coordination normal.   Psychiatric:         Behavior: Behavior normal.         Thought Content: Thought content normal.         Judgment: Judgment normal.       Significant Labs: All pertinent labs within the past 24 hours have been reviewed.    Significant Imaging: I have reviewed all pertinent imaging results/findings within the past 24 hours.      Assessment/Plan:      * Acute HFrEF (heart failure with reduced ejection fraction)  - CHF is currently uncontrolled due to Continued edema of extremities and hypervolemia in the setting of renal failure  - Latest echo 10/12/2022: EF 40%, (+)DD, CVP 15 mmHg, PAP 65mmHg  - Continue Beta Blocker    - home diuretic: n/a may benefit from Lasix    - hospital diuresis: s/p Lasix 100 mg on    - carvediloL (COREG) 3.125 MG tablet BID   - Place on fluid restriction of 1.5 L. Continue to stress to patient importance of self efficacy and  on diet for CHF.   - Monitor clinical status closely. Monitor on telemetry.   - Monitor strict Is&Os and daily weights.    - Last BNP reviewed- and noted below    - Repeat BNP below  - CXR showing diffuse accentuation interstitial markings possibly infiltrates and/or interstitial edema mostly at the lung bases.  Small amount of fluid in the right interlobar fissure and slightly blunted costophrenic angles noted  posteriorly  - Echo with EF of 63%, grade II left ventricular diastolic dysfunction, CVP of 15 mmHg, and moderate-severe pulmonary hypertension.   Recent Labs   Lab 11/23/22  0858   BNP 3,024*       ESRD on dialysis  Chronic kidney disease-mineral and bone disorder  MWF schedule  Residual renal function?- Yes  - Continues to be overloaded on exam. Per nephro, he needs longer sessions/increased fluid removal at outpatient dialysis sessions. Will contact outpatient HD to discuss  - Nephrology consulted, appreciate recs   - Seen on dialysis today, tolerating session with current UFR, no complications.     - No distress on exam. Appears comfortable 2-3 L NC. Oxygen saturations 96%. Denies c/o SOB.    - No lab stick/BP intake on access site   - Continue to monitor intake and output, daily weights    - Please avoid gadolinium, fleets, phos-based laxatives, NSAIDs   - Will follow closely and continue dialysis treatments while in-patient   - Hgb 9.3.    - No Epogen.    - Renal diet with protein intake goal 1.5 g/kg/d   - Novasource with meals    - F/U PO4, Mg, Calcium. And albumin levels.    - Phos 3.4.  - Holding calcium acetate,phosphat bind, (PHOSLO) 667 mg capsule TID per Nephro  - Monitor daily electrolytes and defer dialysis orders to nephrology    Chronic hypoxemic respiratory failure  Patient with Hypoxic Respiratory failure which is Chronic.  he is on home oxygen at 2-3 LPM. Supplemental oxygen was provided and noted. Labs and images were reviewed. Patient Has not had a recent ABG. Will treat underlying causes and adjust management of respiratory failure as follows-   - HD for volume removal  - fluticasone-salmeterol diskus inhaler 250-50 mcg BID  - tiotropium bromide (SPIRIVA RESPIMAT) 2.5 mcg/actuation inhaler daily  - DuoNebs PRN    Hypertensive urgency  Renovascular Hypertension  Patient has a current diagnosis of hypertensive urgency (without evidence of end organ damage) which is uncontrolled.  Latest blood  pressure and vitals reviewed-   Temp:  [97.5 °F (36.4 °C)-98.5 °F (36.9 °C)]   Pulse:  [71-82]   Resp:  [18]   BP: (125-173)/(60-90)   SpO2:  [88 %-93 %] .   - Patient currently off IV antihypertensives.   - Home meds for hypertension were reviewed.   - Medication adjustment for hospital antihypertensives is as follows:   - Nifedipine to 90 mg qD   - Carvedilol 6.25 mg BID   - Clonidine patch every Monday, will replace today   - Imdur 30 mg BID   - Hydralazine 100 mg TID  - Will continue to monitor vital signs.   - Will aim for controlled BP reduction by medications noted above. Monitor and mitigate end organ damage as indicated.    Agitation  - agitated and confused overnight on 11/19   - improved  - s/p oral depakote x1 with improvement   - continue to monitor    HLD (hyperlipidemia)  - Continue statin    Anemia in ESRD (end-stage renal disease)  - Current CBC reviewed-   Lab Results   Component Value Date    HGB 9.3 (L) 11/27/2022    HCT 29.9 (L) 11/27/2022     - Patient's anemia is currently controlled.   - Etiology likely d/t anemia of chronic disease, iron deficiency   - Monitor serial CBC and transfuse if patient becomes hemodynamically unstable, symptomatic or H/H drops below 7/21.     Type 1 diabetes mellitus with chronic kidney disease on chronic dialysis  Patient's FSGs are uncontrolled due to hyperglycemia on current medication regimen.  Last A1c reviewed-   Lab Results   Component Value Date    HGBA1C 11.9 (H) 10/12/2022     Most recent fingerstick glucose reviewed-   Recent Labs   Lab 11/27/22  1700 11/27/22  2152 11/28/22  0737 11/28/22  1103   POCTGLUCOSE 219* 203* 240* 320*     Current correctional scale  Medium  Maintain anti-hyperglycemic dose as follows-   Antihyperglycemics (From admission, onward)    Start     Stop Route Frequency Ordered    11/28/22 2100  insulin detemir U-100 pen 10 Units         -- SubQ 2 times daily 11/28/22 1657    11/26/22 1645  insulin aspart U-100 pen 6 Units         --  SubQ 3 times daily with meals 11/26/22 1421    11/20/22 1820  insulin aspart U-100 pen 0-5 Units         -- SubQ Before meals & nightly PRN 11/20/22 1721        - Holding oral hypoglycemics during admission  - Increased insulin regimen as shown above    Cellulitis of left lower extremity  - chronic changes to bilateral lower extremities  - no active infection observed on exam  - Lower extremity edema L>R  - US BLE negative for DVT      VTE Risk Mitigation (From admission, onward)         Ordered     apixaban tablet 5 mg  2 times daily         11/23/22 1133     IP VTE HIGH RISK PATIENT  Once         11/20/22 1721     Place sequential compression device  Until discontinued         11/20/22 1721                Discharge Planning   GERA: 11/29/2022     Code Status: Full Code   Is the patient medically ready for discharge?: No    Reason for patient still in hospital (select all that apply): Patient trending condition and Treatment  Discharge Plan A: Return to nursing home   Discharge Delays: None known at this time              Landon Darby PA-C  Department of Hospital Medicine   [facility] - Observation 11H

## 2022-11-28 NOTE — PLAN OF CARE
NURSING HOME ORDERS    11/29/2022  [facility]  [facility] - OBSERVATION 11H  [address]  [facility] [address]  Dept: [phone]  Loc: [phone]     Admit to Nursing Home:  [facility]    Diagnoses:  Active Hospital Problems    Diagnosis  POA    *Acute HFrEF (heart failure with reduced ejection fraction) [I50.21]  Yes     Priority: 1 - High    ESRD on dialysis [N18.6, Z99.2]  Not Applicable     Priority: 2     Chronic hypoxemic respiratory failure [J96.11]  Yes     Priority: 3      Chronic    Hypertensive urgency [I16.0]  Yes     Priority: 4     Agitation [R45.1]  Yes    Anemia in ESRD (end-stage renal disease) [N18.6, D63.1]  Yes    Renovascular hypertension [I15.0]  Yes    HLD (hyperlipidemia) [E78.5]  Yes    Chronic kidney disease-mineral and bone disorder [N18.9, E83.9, M89.9]  Yes    Type 1 diabetes mellitus with chronic kidney disease on chronic dialysis [E10.22, N18.6, Z99.2]  Not Applicable    Cellulitis of left lower extremity [L03.116]  Yes      Resolved Hospital Problems   No resolved problems to display.       Patient is homebound due to:  Acute HFrEF (heart failure with reduced ejection fraction)    Allergies:Review of patient's allergies indicates:  No Known Allergies    Vitals:  Routine    Diet: renal diet and fluid restriction: 1.5 L    Activities:   Activity as tolerated    Goals of Care Treatment Preferences:  Code Status: Full Code    Labs:  per facility protocol    Nursing Precautions:  Fall    Oxygen Requirements: 2-3 L NC prn    Consults:   PT to evaluate and treat- 3 times a week and OT to evaluate and treat- 3 times a week       Diabetes Care:  SN to perform and educate Diabetic management with blood glucose monitoring:, Fingerstick blood sugar AC and HS, and Report CBG < 60 or > 350 to physician.      Medications: Discontinue all previous medication orders, if any. See new list below.     Medication List        START taking these medications      NIFEdipine 90  "MG (OSM) 24 hr tablet  Commonly known as: PROCARDIA-XL  Take 1 tablet (90 mg total) by mouth once daily.  Start taking on: November 30, 2022  Replaces: NIFEdipine 60 MG Tbsr            CHANGE how you take these medications      carvediloL 3.125 MG tablet  Commonly known as: COREG  Take 2 tablets (6.25 mg total) by mouth 2 (two) times daily.  What changed: how much to take     hydrALAZINE 100 MG tablet  Commonly known as: APRESOLINE  Take 1 tablet (100 mg total) by mouth every 8 (eight) hours.  What changed:   when to take this  reasons to take this            CONTINUE taking these medications      acetaminophen 650 MG Tbsr  Commonly known as: TYLENOL  Take 650 mg by mouth every 8 (eight) hours as needed (pain or fever over 100.4).     albuterol 90 mcg/actuation inhaler  Commonly known as: PROVENTIL/VENTOLIN HFA  Inhale 2 puffs into the lungs every 6 (six) hours as needed for Wheezing or Shortness of Breath.     apixaban 5 mg Tab  Commonly known as: ELIQUIS  Take 1 tablet (5 mg total) by mouth 2 (two) times daily. Start this dose AFTER you have completed the 7 days of the 10 mg dose.     aspirin 81 MG EC tablet  Commonly known as: ECOTRIN  Take 81 mg by mouth once daily.     atorvastatin 40 MG tablet  Commonly known as: LIPITOR  Take 1 tablet (40 mg total) by mouth once daily.     BD ULTRA-FINE RODRÍGUEZ PEN NEEDLE 32 gauge x 5/32" Ndle  Generic drug: pen needle, diabetic  Use to inject 1 each into the skin 5 (five) times daily.     cetirizine 10 MG tablet  Commonly known as: ZYRTEC  Take 10 mg by mouth daily as needed (itching).     cloNIDine 0.3 mg/24 hr td ptwk 0.3 mg/24 hr  Commonly known as: CATAPRES  Place 1 patch onto the skin every Saturday.     FLUoxetine 40 MG capsule  Take 40 mg by mouth once daily.     fluticasone-salmeterol 250-50 mcg/dose 250-50 mcg/dose diskus inhaler  Commonly known as: ADVAIR  Inhale 1 puff into the lungs 2 (two) times daily.     GLUCAGON EMERGENCY KIT (HUMAN) 1 mg Solr  Generic drug: " glucagon  1 mg into the muscle as needed if CBG < 70     HYDROPHILIC CREAM TOP  Apply topically. Apply liberal amount to affected area twice daily for dry skin     isosorbide mononitrate 30 MG 24 hr tablet  Commonly known as: IMDUR  Take 30 mg by mouth 2 (two) times daily.     LEVEMIR FLEXTOUCH U-100 INSULN 100 unit/mL (3 mL) Inpn pen  Generic drug: insulin detemir U-100  Inject 15 Units into the skin 2 (two) times daily. Once in the morning and once before bedtime. Max 30 units per day     melatonin 3 mg Tbdl  Take 9 mg by mouth nightly as needed (sleep).     * NovoLOG Flexpen U-100 Insulin 100 unit/mL (3 mL) Inpn pen  Generic drug: insulin aspart U-100  Inject 10 Units into the skin 3 (three) times daily with meals. Max 30 units per day     * NOVOLOG FLEXPEN U-100 INSULIN SUBQ  Inject into the skin before meals and at bedtime per sliding scale: 0-60= OJ or glucose tab; = 0; 201-250= 2; 251-300= 4; 301-350= 6; 351-400= 8; greater than 400= 10 units then call MD     sodium chloride 0.65 % nasal spray  Commonly known as: OCEAN  2 sprays by Nasal route every 4 (four) hours as needed for Congestion.     tiotropium bromide 2.5 mcg/actuation inhaler  Commonly known as: SPIRIVA RESPIMAT  Inhale 2 puffs into the lungs Daily.     triamcinolone acetonide 0.025 % Lotn  Apply topically. Apply to the affected area twice daily     vitamin renal formula (B-complex-vitamin c-folic acid) 1 mg Cap  Commonly known as: NEPHROCAP  Take 1 capsule by mouth once daily.           * This list has 2 medication(s) that are the same as other medications prescribed for you. Read the directions carefully, and ask your doctor or other care provider to review them with you.                STOP taking these medications      calcium acetate(phosphat bind) 667 mg capsule  Commonly known as: PHOSLO     NIFEdipine 60 MG Tbsr  Commonly known as: ADALAT CC  Replaced by: NIFEdipine 90 MG (OSM) 24 hr tablet     oxyCODONE-acetaminophen 5-325 mg per  tablet  Commonly known as: PERCOCET            Defer phosphate binder to outpatient Nephrologist/Dialysis    Immunizations Administered as of 11/29/2022       No immunizations on file.            This patient has had both covid vaccinations    Some patients may experience side effects after vaccination.  These may include fever, headache, muscle or joint aches.  Most symptoms resolve with 24-48 hours and do not require urgent medical evaluation unless they persist for more than 72 hours or symptoms are concerning for an unrelated medical condition.          _________________________________  Landon Darby PA-C  11/29/2022

## 2022-11-28 NOTE — PLAN OF CARE
Pt alert, stable; scheduled meds given without difficulty; no distress noted during shift; safety maintained.    Problem: Device-Related Complication Risk (Hemodialysis)  Goal: Safe, Effective Therapy Delivery  Outcome: Ongoing, Progressing     Problem: Hemodynamic Instability (Hemodialysis)  Goal: Effective Tissue Perfusion  Outcome: Ongoing, Progressing     Problem: Infection (Hemodialysis)  Goal: Absence of Infection Signs and Symptoms  Outcome: Ongoing, Progressing     Problem: Adult Inpatient Plan of Care  Goal: Plan of Care Review  Outcome: Ongoing, Progressing  Goal: Patient-Specific Goal (Individualized)  Outcome: Ongoing, Progressing  Goal: Absence of Hospital-Acquired Illness or Injury  Outcome: Ongoing, Progressing  Goal: Optimal Comfort and Wellbeing  Outcome: Ongoing, Progressing  Goal: Readiness for Transition of Care  Outcome: Ongoing, Progressing     Problem: Diabetes Comorbidity  Goal: Blood Glucose Level Within Targeted Range  Outcome: Ongoing, Progressing     Problem: Electrolyte Imbalance (Chronic Kidney Disease)  Goal: Electrolyte Balance  Outcome: Ongoing, Progressing     Problem: Fluid Volume Excess (Chronic Kidney Disease)  Goal: Fluid Balance  Outcome: Ongoing, Progressing     Problem: Skin Injury Risk Increased  Goal: Skin Health and Integrity  Outcome: Ongoing, Progressing     Problem: Infection  Goal: Absence of Infection Signs and Symptoms  Outcome: Ongoing, Progressing

## 2022-11-28 NOTE — PLAN OF CARE
[facility] - [facility]ation 11H  Discharge Reassessment    Primary Care Provider: Primary Doctor No    Expected Discharge Date: 11/29/2022    Reassessment (most recent)       Discharge Reassessment - 11/28/22 1345          Discharge Reassessment    Assessment Type Discharge Planning Reassessment     Did the patient's condition or plan change since previous assessment? No     Discharge Plan discussed with: Patient     Communicated GERA with patient/caregiver Yes     Discharge Plan A Return to nursing home     Discharge Plan B Return to Nursing Home     DME Needed Upon Discharge  none     Discharge Barriers Identified None     Why the patient remains in the hospital Requires continued medical care        Post-Acute Status    Post-Acute Placement Status Referrals Sent     Discharge Delays None known at this time                   NINO MEJIA. Evaluated at bedside today with patient's mother present. No emergent dialysis needs today. Patient reports wanting to go back to NH. Discussed the need for inpatient dialysis tomorrow per Nephrology and that his outpatient dialysis clinic will need to be updated on his current HD needs. Patient reports understanding. He has no complaints at this time, denying HA, nausea, vomiting, chest pain, worsening SOB, diarrhea, or constipation.  [facility] updated on pt's current GERA via [facility].   Will continue to update plan as needed.  Cedrick Fraga, RN,BSN

## 2022-11-28 NOTE — ASSESSMENT & PLAN NOTE
Renovascular Hypertension  Patient has a current diagnosis of hypertensive urgency (without evidence of end organ damage) which is uncontrolled.  Latest blood pressure and vitals reviewed-   Temp:  [97.5 °F (36.4 °C)-98.5 °F (36.9 °C)]   Pulse:  [71-82]   Resp:  [18]   BP: (125-173)/(60-90)   SpO2:  [88 %-93 %] .   - Patient currently off IV antihypertensives.   - Home meds for hypertension were reviewed.   - Medication adjustment for hospital antihypertensives is as follows:   - Nifedipine to 90 mg qD   - Carvedilol 6.25 mg BID   - Clonidine patch every Monday, will replace today   - Imdur 30 mg BID   - Hydralazine 100 mg TID  - Will continue to monitor vital signs.   - Will aim for controlled BP reduction by medications noted above. Monitor and mitigate end organ damage as indicated.

## 2022-11-28 NOTE — ASSESSMENT & PLAN NOTE
Chronic kidney disease-mineral and bone disorder  MWF schedule  Residual renal function?- Yes  - Continues to be overloaded on exam. Per nephro, he needs longer sessions/increased fluid removal at outpatient dialysis sessions. Will contact outpatient HD to discuss  - Nephrology consulted, appreciate recs   - Seen on dialysis today, tolerating session with current UFR, no complications.     - No distress on exam. Appears comfortable 2-3 L NC. Oxygen saturations 96%. Denies c/o SOB.    - No lab stick/BP intake on access site   - Continue to monitor intake and output, daily weights    - Please avoid gadolinium, fleets, phos-based laxatives, NSAIDs   - Will follow closely and continue dialysis treatments while in-patient   - Hgb 9.3.    - No Epogen.    - Renal diet with protein intake goal 1.5 g/kg/d   - Novasource with meals    - F/U PO4, Mg, Calcium. And albumin levels.    - Phos 3.4.  - Holding calcium acetate,phosphat bind, (PHOSLO) 667 mg capsule TID per Nephro  - Monitor daily electrolytes and defer dialysis orders to nephrology

## 2022-11-28 NOTE — ASSESSMENT & PLAN NOTE
Patient's FSGs are uncontrolled due to hyperglycemia on current medication regimen.  Last A1c reviewed-   Lab Results   Component Value Date    HGBA1C 11.9 (H) 10/12/2022     Most recent fingerstick glucose reviewed-   Recent Labs   Lab 11/27/22  1700 11/27/22  2152 11/28/22  0737 11/28/22  1103   POCTGLUCOSE 219* 203* 240* 320*     Current correctional scale  Medium  Maintain anti-hyperglycemic dose as follows-   Antihyperglycemics (From admission, onward)    Start     Stop Route Frequency Ordered    11/28/22 2100  insulin detemir U-100 pen 10 Units         -- SubQ 2 times daily 11/28/22 1657    11/26/22 1645  insulin aspart U-100 pen 6 Units         -- SubQ 3 times daily with meals 11/26/22 1421    11/20/22 1820  insulin aspart U-100 pen 0-5 Units         -- SubQ Before meals & nightly PRN 11/20/22 1721        - Holding oral hypoglycemics during admission  - Increased insulin regimen as shown above

## 2022-11-28 NOTE — PROGRESS NOTES
HD txt complete.  3L UF removed.  Needles removed & dressing applied.  Tolerated well.       Pt transported back to his room in his bed.

## 2022-11-28 NOTE — ASSESSMENT & PLAN NOTE
- Current CBC reviewed-   Lab Results   Component Value Date    HGB 9.3 (L) 11/27/2022    HCT 29.9 (L) 11/27/2022     - Patient's anemia is currently controlled.   - Etiology likely d/t anemia of chronic disease, iron deficiency   - Monitor serial CBC and transfuse if patient becomes hemodynamically unstable, symptomatic or H/H drops below 7/21.

## 2022-11-29 NOTE — SUBJECTIVE & OBJECTIVE
Interval History: 94 % 2L NC. HD yesterday, net UF 3L removed.     Review of patient's allergies indicates:  No Known Allergies  Current Facility-Administered Medications   Medication Frequency    0.9%  NaCl infusion Once    0.9%  NaCl infusion Once    acetaminophen tablet 650 mg Q8H PRN    acetaminophen tablet 650 mg Q4H PRN    albuterol-ipratropium 2.5 mg-0.5 mg/3 mL nebulizer solution 3 mL Q6H PRN    aluminum-magnesium hydroxide-simethicone 200-200-20 mg/5 mL suspension 30 mL QID PRN    apixaban tablet 5 mg BID    aspirin EC tablet 81 mg Daily    atorvastatin tablet 40 mg Daily    carvediloL tablet 6.25 mg BID    cloNIDine 0.3 mg/24 hr td ptwk 1 patch Every Mon    dextrose 10% bolus 125 mL PRN    dextrose 10% bolus 250 mL PRN    diphenhydrAMINE capsule 25 mg Q6H PRN    fluticasone furoate-vilanteroL 100-25 mcg/dose diskus inhaler 1 puff Daily    glucagon (human recombinant) injection 1 mg PRN    glucose chewable tablet 16 g PRN    glucose chewable tablet 24 g PRN    hydrALAZINE tablet 100 mg TID    insulin aspart U-100 pen 0-5 Units QID (AC + HS) PRN    insulin aspart U-100 pen 6 Units TIDWM    insulin detemir U-100 pen 10 Units BID    isosorbide mononitrate 24 hr tablet 30 mg BID    LIDOcaine HCl 2% urojet PRN    melatonin tablet 6 mg Nightly PRN    naloxone 0.4 mg/mL injection 0.02 mg PRN    NIFEdipine 24 hr tablet 90 mg Daily    ondansetron disintegrating tablet 8 mg Q8H PRN    polyethylene glycol packet 17 g Daily    senna tablet 8.6 mg BID    sodium chloride 0.9% flush 10 mL Q12H PRN    tiotropium bromide 2.5 mcg/actuation inhaler 2 puff Daily       Objective:     Vital Signs (Most Recent):  Temp: 97 °F (36.1 °C) (11/29/22 1233)  Pulse: 68 (11/29/22 1233)  Resp: 19 (11/29/22 1233)  BP: (!) 152/73 (11/29/22 1233)  SpO2: (!) 91 % (11/29/22 1233)  O2 Device (Oxygen Therapy): nasal cannula w/ humidification (11/29/22 1300)   Vital Signs (24h Range):  Temp:  [97 °F (36.1 °C)-97.8 °F (36.6 °C)] 97 °F (36.1  °C)  Pulse:  [46-82] 68  Resp:  [18-20] 19  SpO2:  [91 %-94 %] 91 %  BP: (135-173)/(69-90) 152/73     Weight: 84.8 kg (187 lb) (22 1300)  Body mass index is 27.62 kg/m².  Body surface area is 2.03 meters squared.    I/O last 3 completed shifts:  In: 600 [Other:600]  Out: 3600 [Other:3600]    Physical Exam  Vitals and nursing note reviewed.   Constitutional:       General: He is not in acute distress.     Appearance: He is well-developed.   HENT:      Head: Normocephalic and atraumatic.      Comments: Periorbital and perioral facial swelling worsened     Mouth/Throat:      Pharynx: No oropharyngeal exudate.   Eyes:      Conjunctiva/sclera: Conjunctivae normal.      Pupils: Pupils are equal, round, and reactive to light.   Cardiovascular:      Rate and Rhythm: Normal rate and regular rhythm.      Heart sounds: Normal heart sounds.      Arteriovenous access: Left arteriovenous access is present.  Pulmonary:      Effort: Pulmonary effort is normal. No respiratory distress.      Breath sounds: No stridor. Rales (diffuse coarse rales througout lung fields) present. No wheezing or rhonchi.      Comments: on 2 L NC  Abdominal:      General: Bowel sounds are normal. There is no distension.      Palpations: Abdomen is soft.      Tenderness: There is no abdominal tenderness.   Musculoskeletal:         General: No tenderness. Normal range of motion.      Cervical back: Normal range of motion and neck supple.      Right lower le+ Pitting Edema present.      Left lower le+ Pitting Edema present.   Lymphadenopathy:      Cervical: No cervical adenopathy.   Skin:     General: Skin is warm and dry.      Capillary Refill: Capillary refill takes less than 2 seconds.      Findings: No rash.   Neurological:      Mental Status: He is alert and oriented to person, place, and time.      Cranial Nerves: No cranial nerve deficit.      Sensory: No sensory deficit.      Coordination: Coordination normal.   Psychiatric:          Behavior: Behavior normal.         Thought Content: Thought content normal.         Judgment: Judgment normal.       Significant Labs:  CBC:   Recent Labs   Lab 11/29/22  0515   WBC 5.12   RBC 3.14*   HGB 9.0*   HCT 29.2*      MCV 93   MCH 28.7   MCHC 30.8*     CMP:   Recent Labs   Lab 11/27/22  1726 11/29/22  0515   * 90   CALCIUM 8.0* 8.3*   ALBUMIN 2.5*  --    PROT 6.2  --    * 136   K 4.4 4.6   CO2 23 23    104   BUN 46* 36*   CREATININE 5.4* 4.7*   ALKPHOS 244*  --    ALT 22  --    AST 27  --    BILITOT 0.7  --      All labs within the past 24 hours have been reviewed.

## 2022-11-29 NOTE — ASSESSMENT & PLAN NOTE
Outpatient HD Information:    -[place] [facility] [place]  -HD tx days: MWF  -HD tx time: 240mins  -Last HD tx prior to presentation: 11/16/22  -HD access: LUE AVF  -HD modality: iHD   -Residual urine: Anuric   -EDW: 79kg     Last HD prior to hospitalization 11/16 pt was 11kg above EDW    Plan/Recommendations:    -Improvement in 02 requirements, plan for next per outpatient schedule HD 11/30   -Obtain standing weight prior to HD and after to determine new target weight   -1.5 fluid restrictions   -Strict I/O's and daily weights  -Daily renal function panels   -Renally dose meds

## 2022-11-29 NOTE — PLAN OF CARE
Problem: Device-Related Complication Risk (Hemodialysis)  Goal: Safe, Effective Therapy Delivery  11/29/2022 0555 by Berna Chiu LPN  Outcome: Ongoing, Progressing  11/29/2022 0551 by Berna Chiu LPN  Outcome: Ongoing, Progressing     Problem: Hemodynamic Instability (Hemodialysis)  Goal: Effective Tissue Perfusion  11/29/2022 0555 by Berna Chiu LPN  Outcome: Ongoing, Progressing  11/29/2022 0551 by Berna Chiu LPN  Outcome: Ongoing, Progressing     Problem: Infection (Hemodialysis)  Goal: Absence of Infection Signs and Symptoms  11/29/2022 0555 by Berna Chiu LPN  Outcome: Ongoing, Progressing  11/29/2022 0551 by Berna Chiu LPN  Outcome: Ongoing, Progressing     Problem: Adult Inpatient Plan of Care  Goal: Plan of Care Review  11/29/2022 0555 by Berna Chiu LPN  Outcome: Ongoing, Progressing  11/29/2022 0551 by Berna Chiu LPN  Outcome: Ongoing, Progressing  Goal: Patient-Specific Goal (Individualized)  11/29/2022 0555 by Berna Chiu LPN  Outcome: Ongoing, Progressing  11/29/2022 0551 by Berna Chiu LPN  Outcome: Ongoing, Progressing  Goal: Absence of Hospital-Acquired Illness or Injury  11/29/2022 0555 by Berna Chiu LPN  Outcome: Ongoing, Progressing  11/29/2022 0551 by Berna Chiu LPN  Outcome: Ongoing, Progressing  Goal: Optimal Comfort and Wellbeing  11/29/2022 0555 by Berna Chiu LPN  Outcome: Ongoing, Progressing  11/29/2022 0551 by Berna Chiu LPN  Outcome: Ongoing, Progressing  Goal: Readiness for Transition of Care  11/29/2022 0555 by Berna Chiu LPN  Outcome: Ongoing, Progressing  11/29/2022 0551 by Berna Chiu LPN  Outcome: Ongoing, Progressing     Problem: Diabetes Comorbidity  Goal: Blood Glucose Level Within Targeted Range  11/29/2022 0555 by Berna Chiu LPN  Outcome: Ongoing, Progressing  11/29/2022 0551 by Berna Chiu LPN  Outcome: Ongoing, Progressing     Problem: Electrolyte Imbalance (Chronic Kidney Disease)  Goal: Electrolyte  Balance  11/29/2022 0555 by Berna Chiu LPN  Outcome: Ongoing, Progressing  11/29/2022 0551 by Berna Chiu LPN  Outcome: Ongoing, Progressing     Problem: Fluid Volume Excess (Chronic Kidney Disease)  Goal: Fluid Balance  11/29/2022 0555 by Berna Chiu LPN  Outcome: Ongoing, Progressing  11/29/2022 0551 by Berna Chiu LPN  Outcome: Ongoing, Progressing     Problem: Skin Injury Risk Increased  Goal: Skin Health and Integrity  11/29/2022 0555 by Berna Chiu LPN  Outcome: Ongoing, Progressing  11/29/2022 0551 by Berna Chiu LPN  Outcome: Ongoing, Progressing     Problem: Infection  Goal: Absence of Infection Signs and Symptoms  11/29/2022 0555 by Berna Chiu LPN  Outcome: Ongoing, Progressing  11/29/2022 0551 by Berna Chiu LPN  Outcome: Ongoing, Progressing

## 2022-11-29 NOTE — PLAN OF CARE
11/29/22 1447   Post-Acute Status   Post-Acute Authorization Placement   Post-Acute Placement Status Set-up Complete/Auth obtained     Pt is a resident of [facility] located at [address] [place] [address]. Pt will be going to  [address]. ADELITA Mercado can call report to [phone].    SW arranged stretcher transport via [facility]. Requested  time is 3:45PM. Requested  time does not guarantee arrival time.      Meme Payne LMSW  [facility]  Ext. [phone]

## 2022-11-29 NOTE — NURSING
Attempt to call report to [facility] but was unsuccessful.  states nurse is on other line and will call me back for report.

## 2022-11-29 NOTE — NURSING
Pt complaining that he can't breath. pt's o2 sat  93% on 2L. RR 22. Lung sounds diminished bilaterally. Pt appears to be anxious. JENNIFER Navarrete notified of above via secure chat. Providers states will come to bedside to assess.

## 2022-11-29 NOTE — PROGRESS NOTES
[facility]  Nephrology  Progress Note    Patient Name: Cosme Lewis  MRN: [id number]  Admission Date: 11/18/2022  Hospital Length of Stay: 9 days  Attending Provider: Jose Daly MD   Primary Care Physician: Primary Doctor No  Principal Problem:Acute HFrEF (heart failure with reduced ejection fraction)    Subjective:       Interval History: 94 % 2L NC. HD yesterday, net UF 3L removed.     Review of patient's allergies indicates:  No Known Allergies  Current Facility-Administered Medications   Medication Frequency    0.9%  NaCl infusion Once    0.9%  NaCl infusion Once    acetaminophen tablet 650 mg Q8H PRN    acetaminophen tablet 650 mg Q4H PRN    albuterol-ipratropium 2.5 mg-0.5 mg/3 mL nebulizer solution 3 mL Q6H PRN    aluminum-magnesium hydroxide-simethicone 200-200-20 mg/5 mL suspension 30 mL QID PRN    apixaban tablet 5 mg BID    aspirin EC tablet 81 mg Daily    atorvastatin tablet 40 mg Daily    carvediloL tablet 6.25 mg BID    cloNIDine 0.3 mg/24 hr td ptwk 1 patch Every Mon    dextrose 10% bolus 125 mL PRN    dextrose 10% bolus 250 mL PRN    diphenhydrAMINE capsule 25 mg Q6H PRN    fluticasone furoate-vilanteroL 100-25 mcg/dose diskus inhaler 1 puff Daily    glucagon (human recombinant) injection 1 mg PRN    glucose chewable tablet 16 g PRN    glucose chewable tablet 24 g PRN    hydrALAZINE tablet 100 mg TID    insulin aspart U-100 pen 0-5 Units QID (AC + HS) PRN    insulin aspart U-100 pen 6 Units TIDWM    insulin detemir U-100 pen 10 Units BID    isosorbide mononitrate 24 hr tablet 30 mg BID    LIDOcaine HCl 2% urojet PRN    melatonin tablet 6 mg Nightly PRN    naloxone 0.4 mg/mL injection 0.02 mg PRN    NIFEdipine 24 hr tablet 90 mg Daily    ondansetron disintegrating tablet 8 mg Q8H PRN    polyethylene glycol packet 17 g Daily    senna tablet 8.6 mg BID    sodium chloride 0.9% flush 10 mL Q12H PRN    tiotropium bromide 2.5 mcg/actuation inhaler 2 puff  Daily       Objective:     Vital Signs (Most Recent):  Temp: 97 °F (36.1 °C) (22 1233)  Pulse: 68 (22 1233)  Resp: 19 (22 1233)  BP: (!) 152/73 (22 1233)  SpO2: (!) 91 % (22 1233)  O2 Device (Oxygen Therapy): nasal cannula w/ humidification (22 1300)   Vital Signs (24h Range):  Temp:  [97 °F (36.1 °C)-97.8 °F (36.6 °C)] 97 °F (36.1 °C)  Pulse:  [46-82] 68  Resp:  [18-20] 19  SpO2:  [91 %-94 %] 91 %  BP: (135-173)/(69-90) 152/73     Weight: 84.8 kg (187 lb) (22 1300)  Body mass index is 27.62 kg/m².  Body surface area is 2.03 meters squared.    I/O last 3 completed shifts:  In: 600 [Other:600]  Out: 3600 [Other:3600]    Physical Exam  Vitals and nursing note reviewed.   Constitutional:       General: He is not in acute distress.     Appearance: He is well-developed.   HENT:      Head: Normocephalic and atraumatic.      Comments: Periorbital and perioral facial swelling worsened     Mouth/Throat:      Pharynx: No oropharyngeal exudate.   Eyes:      Conjunctiva/sclera: Conjunctivae normal.      Pupils: Pupils are equal, round, and reactive to light.   Cardiovascular:      Rate and Rhythm: Normal rate and regular rhythm.      Heart sounds: Normal heart sounds.      Arteriovenous access: Left arteriovenous access is present.  Pulmonary:      Effort: Pulmonary effort is normal. No respiratory distress.      Breath sounds: No stridor. Rales (diffuse coarse rales througout lung fields) present. No wheezing or rhonchi.      Comments: on 2 L NC  Abdominal:      General: Bowel sounds are normal. There is no distension.      Palpations: Abdomen is soft.      Tenderness: There is no abdominal tenderness.   Musculoskeletal:         General: No tenderness. Normal range of motion.      Cervical back: Normal range of motion and neck supple.      Right lower le+ Pitting Edema present.      Left lower le+ Pitting Edema present.   Lymphadenopathy:      Cervical: No cervical adenopathy.    Skin:     General: Skin is warm and dry.      Capillary Refill: Capillary refill takes less than 2 seconds.      Findings: No rash.   Neurological:      Mental Status: He is alert and oriented to person, place, and time.      Cranial Nerves: No cranial nerve deficit.      Sensory: No sensory deficit.      Coordination: Coordination normal.   Psychiatric:         Behavior: Behavior normal.         Thought Content: Thought content normal.         Judgment: Judgment normal.       Significant Labs:  CBC:   Recent Labs   Lab 11/29/22  0515   WBC 5.12   RBC 3.14*   HGB 9.0*   HCT 29.2*      MCV 93   MCH 28.7   MCHC 30.8*     CMP:   Recent Labs   Lab 11/27/22  1726 11/29/22  0515   * 90   CALCIUM 8.0* 8.3*   ALBUMIN 2.5*  --    PROT 6.2  --    * 136   K 4.4 4.6   CO2 23 23    104   BUN 46* 36*   CREATININE 5.4* 4.7*   ALKPHOS 244*  --    ALT 22  --    AST 27  --    BILITOT 0.7  --      All labs within the past 24 hours have been reviewed.         Assessment/Plan:     * Acute HFrEF (heart failure with reduced ejection fraction)  - defer to primary team  UF with HD    Anemia in ESRD (end-stage renal disease)  Target 10-11    Chronic kidney disease-mineral and bone disorder  -Low phos diet   Phos 2.6, continue to hold phos binders     ESRD on dialysis  Outpatient HD Information:    -Outpatient HD unit: [facility]  -HD tx days: MWF  -HD tx time: 240mins  -Last HD tx prior to presentation: 11/16/22  -HD access: LUE AVF  -HD modality: iHD   -Residual urine: Anuric   -EDW: 79kg     Last HD prior to hospitalization 11/16 pt was 11kg above EDW    Plan/Recommendations:    -Improvement in 02 requirements, plan for next per outpatient schedule HD 11/30   -Obtain standing weight prior to HD and after to determine new target weight   -1.5 fluid restrictions   -Strict I/O's and daily weights  -Daily renal function panels   -Renally dose meds          Thank you for your consult. I will follow-up with patient.  Please contact us if you have any additional questions.    Citlalli Simpson DNP  Nephrology  [facility]

## 2022-11-29 NOTE — NURSING
Pt being discharged to [facility] in stable condition. Report given to ADELITA Moralez. Pt awaiting stretcher transportation at this time

## 2022-11-30 NOTE — PLAN OF CARE
[facility]  Discharge Final Note    Primary Care Provider: Primary Doctor No    Expected Discharge Date: 11/29/2022    Patient discharged to [facility] via ambulance transportation.     Patient's bedside nurse and patient/family notified of the above.      Final Discharge Note (most recent)       Final Note - 11/30/22 0935          Final Note    Assessment Type Final Discharge Note (P)      Anticipated Discharge Disposition retirement Nursing Home (P)         Post-Acute Status    Post-Acute Authorization Placement (P)      Post-Acute Placement Status Set-up Complete/Auth obtained (P)                      Important Message from Medicare  Important Message from Medicare regarding Discharge Appeal Rights: Given to patient/caregiver, Explained to patient/caregiver, Signed/date by patient/caregiver     Date IMM was signed: 11/28/22  Time IMM was signed: 1109        No future appointments.    SW scheduled post-discharge follow-up appointment and information added to AVS.     Meme Payne LMSW  [facility]  Ext. [phone]

## 2022-12-11 NOTE — ASSESSMENT & PLAN NOTE
- Current CBC reviewed-   Lab Results   Component Value Date    HGB 9.0 (L) 11/29/2022    HCT 29.2 (L) 11/29/2022     - Patient's anemia is currently controlled.   - Etiology likely d/t anemia of chronic disease, iron deficiency   - Monitor serial CBC and transfuse if patient becomes hemodynamically unstable, symptomatic or H/H drops below 7/21.

## 2022-12-11 NOTE — ASSESSMENT & PLAN NOTE
Patient's FSGs are uncontrolled due to hyperglycemia on current medication regimen.  Last A1c reviewed-   Lab Results   Component Value Date    HGBA1C 11.9 (H) 10/12/2022     Most recent fingerstick glucose reviewed-     Current correctional scale  Medium  Maintain anti-hyperglycemic dose as follows-    - Insulin Aspart 6 U TIDWM   - Insulin Detemir 10 U BID   - Insulin Aspart 0-5 U prn hyperglycemia  - Holding oral hypoglycemics during admission  - Increased insulin regimen as shown above

## 2022-12-11 NOTE — ASSESSMENT & PLAN NOTE
Renovascular Hypertension  Patient has a current diagnosis of hypertensive urgency (without evidence of end organ damage) which is uncontrolled.  Latest blood pressure and vitals reviewed-    .   - Patient currently off IV antihypertensives.   - Home meds for hypertension were reviewed.   - Medication adjustment for hospital antihypertensives is as follows:   - Nifedipine to 90 mg qD   - Carvedilol 6.25 mg BID   - Clonidine patch every Monday, will replace today   - Imdur 30 mg BID   - Hydralazine 100 mg TID  - Will continue to monitor vital signs.   - Will aim for controlled BP reduction by medications noted above. Monitor and mitigate end organ damage as indicated.

## 2022-12-11 NOTE — ASSESSMENT & PLAN NOTE
- CHF is currently uncontrolled due to Continued edema of extremities and hypervolemia in the setting of renal failure  - Latest echo 10/12/2022: EF 40%, (+)DD, CVP 15 mmHg, PAP 65mmHg  - Continue Beta Blocker    - home diuretic: n/a may benefit from Lasix    - hospital diuresis: s/p Lasix 100 mg on 11/19   - carvediloL (COREG) 3.125 MG tablet BID   - Place on fluid restriction of 1.5 L. Continue to stress to patient importance of self efficacy and  on diet for CHF.   - Monitor clinical status closely. Monitor on telemetry.   - Monitor strict Is&Os and daily weights.    - Last BNP reviewed- and noted below    - Repeat BNP below  - CXR showing diffuse accentuation interstitial markings possibly infiltrates and/or interstitial edema mostly at the lung bases.  Small amount of fluid in the right interlobar fissure and slightly blunted costophrenic angles noted posteriorly  - Echo with EF of 63%, grade II left ventricular diastolic dysfunction, CVP of 15 mmHg, and moderate-severe pulmonary hypertension.

## 2022-12-11 NOTE — DISCHARGE SUMMARY
[facility] - Observation [address] [place] [address] Medicine  Discharge Summary      Patient Name: Cosme Lewis  MRN: [id number]  MARCY: [id number]  Patient Class: IP- Inpatient  Admission Date: 11/18/2022  Hospital Length of Stay: 9 days  Discharge Date and Time: 11/29/2022  5:50 PM  Attending Physician: Rochelle att. providers found   Discharging Provider: Landon Darby PA-C  Primary Care Provider: Primary Doctor Rochelle  [facility] Medicine Team: [facility] HOSP MED E Landon Darby PA-C  Primary Care Team: [facility] HOSP MED E    HPI:   Cosme Lewis is a 57 y.o. 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 Per ED MD, the facility believed he appeared more overloaded than normal and therefore sent him to the ED. The patient reports he was at his mother's home before being picked up by transportation. He states he passed out in while in transport and never arrived at the facility, he does not seem like the most reliable historian. He reports using supplemental oxygen at baseline, unsure of how many liters. He denies chest pain, worsening shortness of breath, diarrhea, abdominal pain, cough or sore throat. He has no awareness of his medications. He states he lives in a facility for people who are 55 and older. He denies tobacco and alcohol use.     ED: afebrile without leukocytosis.nephrology was consulted for HD. VSS. BNP 2,255. Troponin 0.035. admitted to hospital medicine for further management.        * No surgery found *      Hospital Course:   Cosme Lewis was admitted to hospital medicine for management of volume overload in the setting of missed HD. Nephro consulted for HD management. Patient with confusion and agitation overnight, pulled out IV and pulled off telemetry. Given oral Depakote for non-redirectable agitation in setting of prolonged QT. Continued to appear fluid overloaded on exam,  required several episodes of HD. Increased Nifedipine to 90 mg qD due to hypertension. Increased prandial and long acting insulin regimen due to continued hyperglycemia. Holding phosphate binder per Nephrology. Resumed home Hydralazine. Echo with EF of 63%, grade II left ventricular diastolic dysfunction, CVP of 15 mmHg, and moderate-severe pulmonary hypertension. Persistently volume overloaded even with several serial HD sessions. Nephrology consulted, recommended repeat CXR to determine if pleural effusions are improving or worsening. CXR with a small amount of fluid in the right interlobar fissure and slightly blunted costophrenic angles noted posteriorly. Slightly improved aeration of the lungs as compared to the previous study. Patient cleared for discharge by Nephrology. They will contact his outpatient HD center to clarify dry weight and length of sessions. He will be discharged back to [facility].       Goals of Care Treatment Preferences:  Code Status: Full Code      Consults:   Consults (From admission, onward)        Status Ordering Provider     Inpatient consult to Midline team  Once        Provider:  (Not yet assigned)    Completed JOSE BATES     Inpatient consult to Nephrology  Once        Provider:  (Not yet assigned)    Completed FRANK FREGOSO     Inpatient consult to PICC team ([facility])  Once        Provider:  (Not yet assigned)    Completed KATELIN RETANA     Inpatient consult to Nephrology  Once        Provider:  (Not yet assigned)    Completed HERBERT COURTNEY          * Acute HFrEF (heart failure with reduced ejection fraction)  - CHF is currently uncontrolled due to Continued edema of extremities and hypervolemia in the setting of renal failure  - Latest echo 10/12/2022: EF 40%, (+)DD, CVP 15 mmHg, PAP 65mmHg  - Continue Beta Blocker    - home diuretic: n/a may benefit from Lasix    - hospital diuresis: s/p Lasix 100 mg on 11/19   - carvediloL (COREG) 3.125 MG tablet BID   - Place on  fluid restriction of 1.5 L. Continue to stress to patient importance of self efficacy and  on diet for CHF.   - Monitor clinical status closely. Monitor on telemetry.   - Monitor strict Is&Os and daily weights.    - Last BNP reviewed- and noted below    - Repeat BNP below  - CXR showing diffuse accentuation interstitial markings possibly infiltrates and/or interstitial edema mostly at the lung bases.  Small amount of fluid in the right interlobar fissure and slightly blunted costophrenic angles noted posteriorly  - Echo with EF of 63%, grade II left ventricular diastolic dysfunction, CVP of 15 mmHg, and moderate-severe pulmonary hypertension.     ESRD on dialysis  Chronic kidney disease-mineral and bone disorder  MWF schedule  Residual renal function?- Yes  - Continues to be overloaded on exam. Per nephro, he needs longer sessions/increased fluid removal at outpatient dialysis sessions. Will contact outpatient HD to discuss  - Nephrology consulted, appreciate recs   - Seen on dialysis today, tolerating session with current UFR, no complications.     - No distress on exam. Appears comfortable 2-3 L NC. Oxygen saturations 96%. Denies c/o SOB.    - No lab stick/BP intake on access site   - Continue to monitor intake and output, daily weights    - Please avoid gadolinium, fleets, phos-based laxatives, NSAIDs   - Will follow closely and continue dialysis treatments while in-patient   - Hgb 9.3.    - No Epogen.    - Renal diet with protein intake goal 1.5 g/kg/d   - Novasource with meals    - F/U PO4, Mg, Calcium. And albumin levels.    - Phos 3.4.  - Holding calcium acetate,phosphat bind, (PHOSLO) 667 mg capsule TID per Nephro  - Monitor daily electrolytes and defer dialysis orders to nephrology    Chronic hypoxemic respiratory failure  Patient with Hypoxic Respiratory failure which is Chronic.  he is on home oxygen at 2-3 LPM. Supplemental oxygen was provided and noted. Labs and images were reviewed. Patient Has  not had a recent ABG. Will treat underlying causes and adjust management of respiratory failure as follows-   - HD for volume removal  - fluticasone-salmeterol diskus inhaler 250-50 mcg BID  - tiotropium bromide (SPIRIVA RESPIMAT) 2.5 mcg/actuation inhaler daily  - DuoNebs PRN    Hypertensive urgency  Renovascular Hypertension  Patient has a current diagnosis of hypertensive urgency (without evidence of end organ damage) which is uncontrolled.  Latest blood pressure and vitals reviewed-    .   - Patient currently off IV antihypertensives.   - Home meds for hypertension were reviewed.   - Medication adjustment for hospital antihypertensives is as follows:   - Nifedipine to 90 mg qD   - Carvedilol 6.25 mg BID   - Clonidine patch every Monday, will replace today   - Imdur 30 mg BID   - Hydralazine 100 mg TID  - Will continue to monitor vital signs.   - Will aim for controlled BP reduction by medications noted above. Monitor and mitigate end organ damage as indicated.    Agitation  - agitated and confused overnight on 11/19   - improved  - s/p oral depakote x1 with improvement   - continue to monitor    HLD (hyperlipidemia)  - Continue statin    Anemia in ESRD (end-stage renal disease)  - Current CBC reviewed-   Lab Results   Component Value Date    HGB 9.0 (L) 11/29/2022    HCT 29.2 (L) 11/29/2022     - Patient's anemia is currently controlled.   - Etiology likely d/t anemia of chronic disease, iron deficiency   - Monitor serial CBC and transfuse if patient becomes hemodynamically unstable, symptomatic or H/H drops below 7/21.     Type 1 diabetes mellitus with chronic kidney disease on chronic dialysis  Patient's FSGs are uncontrolled due to hyperglycemia on current medication regimen.  Last A1c reviewed-   Lab Results   Component Value Date    HGBA1C 11.9 (H) 10/12/2022     Most recent fingerstick glucose reviewed-     Current correctional scale  Medium  Maintain anti-hyperglycemic dose as follows-    - Insulin Aspart  6 U TIDWM   - Insulin Detemir 10 U BID   - Insulin Aspart 0-5 U prn hyperglycemia  - Holding oral hypoglycemics during admission  - Increased insulin regimen as shown above    Cellulitis of left lower extremity  - chronic changes to bilateral lower extremities  - no active infection observed on exam  - Lower extremity edema L>R  - US BLE negative for DVT      Final Active Diagnoses:    Diagnosis Date Noted POA    PRINCIPAL PROBLEM:  Acute HFrEF (heart failure with reduced ejection fraction) [I50.21] 10/12/2022 Yes    ESRD on dialysis [N18.6, Z99.2]  Not Applicable    Chronic hypoxemic respiratory failure [J96.11] 10/12/2022 Yes     Chronic    Hypertensive urgency [I16.0] 11/21/2022 Yes    Agitation [R45.1] 11/19/2022 Yes    Anemia in ESRD (end-stage renal disease) [N18.6, D63.1] 11/18/2022 Yes    Renovascular hypertension [I15.0] 11/18/2022 Yes    HLD (hyperlipidemia) [E78.5] 11/18/2022 Yes    Chronic kidney disease-mineral and bone disorder [N18.9, E83.9, M89.9] 10/12/2022 Yes    Type 1 diabetes mellitus with chronic kidney disease on chronic dialysis [E10.22, N18.6, Z99.2] 10/12/2022 Not Applicable    Cellulitis of left lower extremity [L03.116] 10/12/2022 Yes      Problems Resolved During this Admission:       Discharged Condition: fair    Disposition: Home or Self Care    Patient Instructions:      Notify your health care provider if you experience any of the following:  temperature >100.4     Notify your health care provider if you experience any of the following:  persistent nausea and vomiting or diarrhea     Notify your health care provider if you experience any of the following:  severe uncontrolled pain     Notify your health care provider if you experience any of the following:  redness, tenderness, or signs of infection (pain, swelling, redness, odor or green/yellow discharge around incision site)     Notify your health care provider if you experience any of the following:  difficulty  "breathing or increased cough     Notify your health care provider if you experience any of the following:  increased confusion or weakness     Notify your health care provider if you experience any of the following:  persistent dizziness, light-headedness, or visual disturbances     Activity as tolerated           Pending Diagnostic Studies:     None         Medications:  Reconciled Home Medications:      Medication List      START taking these medications    NIFEdipine 90 MG (OSM) 24 hr tablet  Commonly known as: PROCARDIA-XL  Take 1 tablet (90 mg total) by mouth once daily.  Replaces: NIFEdipine 60 MG Tbsr        CHANGE how you take these medications    carvediloL 3.125 MG tablet  Commonly known as: COREG  Take 2 tablets (6.25 mg total) by mouth 2 (two) times daily.  What changed: how much to take     hydrALAZINE 100 MG tablet  Commonly known as: APRESOLINE  Take 1 tablet (100 mg total) by mouth every 8 (eight) hours.  What changed:   · when to take this  · reasons to take this        CONTINUE taking these medications    acetaminophen 650 MG Tbsr  Commonly known as: TYLENOL  Take 650 mg by mouth every 8 (eight) hours as needed (pain or fever over 100.4).     albuterol 90 mcg/actuation inhaler  Commonly known as: PROVENTIL/VENTOLIN HFA  Inhale 2 puffs into the lungs every 6 (six) hours as needed for Wheezing or Shortness of Breath.     apixaban 5 mg Tab  Commonly known as: ELIQUIS  Take 1 tablet (5 mg total) by mouth 2 (two) times daily. Start this dose AFTER you have completed the 7 days of the 10 mg dose.     aspirin 81 MG EC tablet  Commonly known as: ECOTRIN  Take 81 mg by mouth once daily.     atorvastatin 40 MG tablet  Commonly known as: LIPITOR  Take 1 tablet (40 mg total) by mouth once daily.     BD ULTRA-FINE RODRÍGUEZ PEN NEEDLE 32 gauge x 5/32" Ndle  Generic drug: pen needle, diabetic  Use to inject 1 each into the skin 5 (five) times daily.     cetirizine 10 MG tablet  Commonly known as: ZYRTEC  Take 10 mg " by mouth daily as needed (itching).     cloNIDine 0.3 mg/24 hr td ptwk 0.3 mg/24 hr  Commonly known as: CATAPRES  Place 1 patch onto the skin every Saturday.     FLUoxetine 40 MG capsule  Take 40 mg by mouth once daily.     fluticasone-salmeterol 250-50 mcg/dose 250-50 mcg/dose diskus inhaler  Commonly known as: ADVAIR  Inhale 1 puff into the lungs 2 (two) times daily.     GLUCAGON EMERGENCY KIT (HUMAN) 1 mg Solr  Generic drug: glucagon  1 mg into the muscle as needed if CBG < 70     HYDROPHILIC CREAM TOP  Apply topically. Apply liberal amount to affected area twice daily for dry skin     isosorbide mononitrate 30 MG 24 hr tablet  Commonly known as: IMDUR  Take 30 mg by mouth 2 (two) times daily.     LEVEMIR FLEXTOUCH U-100 INSULN 100 unit/mL (3 mL) Inpn pen  Generic drug: insulin detemir U-100  Inject 15 Units into the skin 2 (two) times daily. Once in the morning and once before bedtime. Max 30 units per day     melatonin 3 mg Tbdl  Take 9 mg by mouth nightly as needed (sleep).     * NovoLOG Flexpen U-100 Insulin 100 unit/mL (3 mL) Inpn pen  Generic drug: insulin aspart U-100  Inject 10 Units into the skin 3 (three) times daily with meals. Max 30 units per day     * NOVOLOG FLEXPEN U-100 INSULIN SUBQ  Inject into the skin before meals and at bedtime per sliding scale: 0-60= OJ or glucose tab; = 0; 201-250= 2; 251-300= 4; 301-350= 6; 351-400= 8; greater than 400= 10 units then call MD     sodium chloride 0.65 % nasal spray  Commonly known as: OCEAN  2 sprays by Nasal route every 4 (four) hours as needed for Congestion.     tiotropium bromide 2.5 mcg/actuation inhaler  Commonly known as: SPIRIVA RESPIMAT  Inhale 2 puffs into the lungs Daily.     triamcinolone acetonide 0.025 % Lotn  Apply topically. Apply to the affected area twice daily     vitamin renal formula (B-complex-vitamin c-folic acid) 1 mg Cap  Commonly known as: NEPHROCAP  Take 1 capsule by mouth once daily.         * This list has 2 medication(s)  that are the same as other medications prescribed for you. Read the directions carefully, and ask your doctor or other care provider to review them with you.            STOP taking these medications    calcium acetate(phosphat bind) 667 mg capsule  Commonly known as: PHOSLO     NIFEdipine 60 MG Tbsr  Commonly known as: ADALAT CC  Replaced by: NIFEdipine 90 MG (OSM) 24 hr tablet     oxyCODONE-acetaminophen 5-325 mg per tablet  Commonly known as: PERCOCET            Indwelling Lines/Drains at time of discharge:   Lines/Drains/Airways     Drain  Duration                Hemodialysis AV Fistula Left upper arm -- days                Time spent on the discharge of patient: 36 minutes         Landon Darby PA-C  Department of Hospital Medicine  [facility] - Observation 11H

## 2022-12-12 NOTE — PHYSICIAN QUERY
PT Name: Cosme Lewis  MR #: [id number]     DOCUMENTATION CLARIFICATION     CDS/: Rosa Zuñiga RN CDIS             Contact information: [email or web address]    This form is a permanent document in the medical record.     Query Date: December 12, 2022    By submitting this query, we are merely seeking further clarification of documentation.  Please utilize your independent clinical judgment when addressing the question(s) below.    The Medical Record contains the following   Indicators Supporting Clinical Findings Location in Medical Record   x Heart Failure documented  HFpEF - past medical history     * Acute HFrEF (heart failure with reduced ejection fraction) HM documentation - HPI    Discharge summary    x BNP  Latest Reference Range & Units 11/18/22 13:34 11/23/22 08:58   BNP 0 - 99 pg/mL 2,255 (H) 3,024 (H)    Labs    x EF/Echo The left ventricle is normal in size with mild concentric hypertrophy and increased density and normal systolic function.  The estimated ejection fraction is 63%.  Grade II left ventricular diastolic dysfunction.  Severe left atrial enlargement.  Mild right ventricular enlargement with low normal right ventricular systolic function.  There is right ventricular hypertrophy.  Moderate right atrial enlargement.  Mild to moderate tricuspid regurgitation.  Mild pulmonic regurgitation.  Elevated central venous pressure (15 mmHg).  The estimated PA systolic pressure is 67 mmHg.  There is moderate-severe pulmonary hypertension.  Trivial posterolateral pericardial effusion.  There is a moderate left pleural effusion.  There may be some ascites present. Labs    x Radiology findings  CXR showing diffuse accentuation interstitial markings possibly infiltrates and/or interstitial edema mostly at the lung bases.  Small amount of fluid in the right interlobar fissure and slightly blunted costophrenic angles noted posteriorly Discharge summary     Subjective/Objective Respiratory Conditions       Recent/Current MI      Heart Transplant, LVAD     x Edema, JVD    Right lower leg: Edema present.      Left lower leg: Edema present.  H&P     Ascites     x Diuretics/Meds furosemide injection 100 mg  Dose: 100 mg  Freq: Once Route: IV  Start: 11/19/22 0900 End: 11/19/22 0824    furosemide injection 80 mg  Dose: 80 mg  Freq: Once Route: IV  Start: 11/26/22 0018 End: 11/26/22 0043 MAR           MAR    x Other Treatment - Continue Beta Blocker           - home diuretic: n/a may benefit from Lasix           - hospital diuresis: s/p Lasix 100 mg on 11/19          - carvediloL (COREG) 3.125 MG tablet BID   - Place on fluid restriction of 1.5 L. Continue to stress to patient importance of self efficacy and  on diet for CHF.   - Monitor clinical status closely. Monitor on telemetry.   - Monitor strict Is&Os and daily weights.  Discharge summary     Other       Heart failure is a clinical diagnosis which includes symptomatic fluid retention, elevated intracardiac pressures, and/or the inability of the heart to deliver adequate blood flow.    Heart Failure with reduced Ejection Fraction (HFrEF) or Systolic Heart Failure (loses ability to contract normally, EF is <40%)    Heart Failure with preserved Ejection Fraction (HFpEF) or Diastolic Heart Failure (stiff ventricles, does not relax properly, EF is >50%)     Heart Failure with Combined Systolic and Diastolic Failure (stiff ventricles, does not relax properly and EF is <50%)    Mid-range or mildly reduced ejection fraction (HFmrEF) is classified as systolic heart failure.  Congestive heart failure with a recovered EF is classified as Diastolic Heart Failure.  Common clues to acute exacerbation:  Rapidly progressive symptoms (w/in 2 weeks of presentation), using IV diuretics, using supplemental O2, pulmonary edema on Xray, new or worsening pleural effusion, +JVD or other signs of volume overload, MI w/in 4 weeks, and/or BNP >500  The clinical guidelines noted are only  system guidelines, and do not replace the providers clinical judgment.  Due to conflicting documentation Please clarify the TYPE and ACUITY of the heart failure:     [   ]  Acute Systolic Heart Failure (HFrEF or HFmrEF) - new diagnosis   [   ]  Acute on Chronic Systolic Heart Failure (HFrEF or HFmrEF) - worsening of CHF signs/symptoms in preexisting CHF   [   ]  Acute Diastolic Heart Failure (HFpEF) - new diagnosis   [ X ]  Acute on Chronic Diastolic Heart Failure (HFpEF) - worsening of CHF signs/symptoms in preexisting CHF   [   ]  Acute Combined Systolic and Diastolic Heart Failure - new diagnosis   [   ]  Acute on Chronic Combined Systolic and Diastolic Heart Failure - worsening of CHF signs/symptoms in preexisting CHF   [   ]  Other (please specify): ___________________________________   [  ]  Clinically Undetermined           Please document in your progress notes daily for the duration of treatment until resolved and include in your discharge summary.    References:  American Heart Association editorial staff. (2017, May). Ejection Fraction Heart Failure Measurement. American Heart Association. https://www.heart.org/en/health-topics/heart-failure/diagnosing-heart-failure/ejection-fraction-heart-failure-measurement#:~:text=Ejection%20fraction%20(EF)%20is%20a,pushed%20out%20with%20each%20heartbeat  ALBERTO Mendoza (2020, December 15). Heart failure with preserved ejection fraction: Clinical manifestations and diagnosis. [employer]. https://www.[employer].com.com/contents/heart-failure-with-preserved-ejection-fraction-clinical-manifestations-and-diagnosis.  ICD-10-CM/PCS Coding Clinic Third Quarter ICD-10, Effective with discharges: September 8, 2020 [facility] § Heart failure with mid-range or mildly reduced ejection fraction (2020).  ICD-10-CM/PCS Coding Clinic Third Quarter ICD-10, Effective with discharges: September 8, 2020 [facility] § Heart failure with recovered ejection  fraction (6334).  Form No. [id number]

## 2022-12-19 PROBLEM — I50.33 ACUTE ON CHRONIC DIASTOLIC HEART FAILURE: Status: ACTIVE | Noted: 2022-10-12

## 2022-12-19 PROBLEM — I27.20 PULMONARY HYPERTENSION: Status: ACTIVE | Noted: 2022-01-01

## 2022-12-19 NOTE — SUBJECTIVE & OBJECTIVE
"Past Medical History:   Diagnosis Date    Chronic kidney disease-mineral and bone disorder 10/12/2022    COPD (chronic obstructive pulmonary disease)     Diabetes mellitus type 1     ESRD on dialysis     Hypertension     SOB (shortness of breath) 10/12/2022    Patient with history COPD treated with Ellipta the albuterol, fluticasone-salmeterol tachypneic in the ED saturating 94% and 6 L on nasal cannula, patient does not know how many  he uses it is in nursing home.    -duo nebs Q 4  Given that patient is a poor historian, and in the setting of left lower extremity edema and history of coagulopathy PE cannot be ruled out.  Will workup for possible infec       Past Surgical History:   Procedure Laterality Date    AV FISTULA PLACEMENT         Review of patient's allergies indicates:  No Known Allergies  Current Facility-Administered Medications   Medication Frequency    0.9%  NaCl infusion Once    insulin regular injection 8 Units 0.08 mL ED 1 Time     Current Outpatient Medications   Medication    acetaminophen (TYLENOL) 650 MG TbSR    albuterol (PROVENTIL/VENTOLIN HFA) 90 mcg/actuation inhaler    apixaban (ELIQUIS) 5 mg Tab    aspirin (ECOTRIN) 81 MG EC tablet    atorvastatin (LIPITOR) 40 MG tablet    carvediloL (COREG) 3.125 MG tablet    cetirizine (ZYRTEC) 10 MG tablet    cloNIDine 0.3 mg/24 hr td ptwk (CATAPRES) 0.3 mg/24 hr    FLUoxetine 40 MG capsule    fluticasone-salmeterol diskus inhaler 250-50 mcg    GLUCAGON EMERGENCY KIT, HUMAN, 1 mg injection    hydrALAZINE (APRESOLINE) 100 MG tablet    HYDROPHILIC CREAM TOP    insulin aspart (NOVOLOG FLEXPEN U-100 INSULIN SUBQ)    insulin aspart U-100 (NOVOLOG) 100 unit/mL (3 mL) InPn pen    insulin detemir U-100 (LEVEMIR FLEXTOUCH) 100 unit/mL (3 mL) SubQ InPn pen    isosorbide mononitrate (IMDUR) 30 MG 24 hr tablet    melatonin 3 mg TbDL    NIFEdipine (PROCARDIA-XL) 90 MG (OSM) 24 hr tablet    pen needle, diabetic 32 gauge x 5/32" Ndle    sodium chloride (OCEAN) 0.65 " % nasal spray    tiotropium bromide (SPIRIVA RESPIMAT) 2.5 mcg/actuation inhaler    triamcinolone acetonide 0.025 % Lotn    vitamin renal formula, B-complex-vitamin c-folic acid, (NEPHROCAP) 1 mg Cap     Family History       Problem Relation (Age of Onset)    Diabetes Mother          Tobacco Use    Smoking status: Never    Smokeless tobacco: Never   Substance and Sexual Activity    Alcohol use: Not Currently    Drug use: Not on file    Sexual activity: Not Currently     Review of Systems   Constitutional:  Negative for fever.   HENT: Negative.     Eyes: Negative.    Respiratory:  Positive for shortness of breath.    Cardiovascular: Negative.    Gastrointestinal: Negative.    Endocrine: Negative.    Genitourinary:  Positive for decreased urine volume.   Musculoskeletal: Negative.    Skin: Negative.    Allergic/Immunologic: Negative.    Neurological: Negative.    Hematological: Negative.    Psychiatric/Behavioral: Negative.     Objective:     Vital Signs (Most Recent):  Temp: 97.5 °F (36.4 °C) (12/19/22 1157)  Pulse: 70 (12/19/22 1406)  Resp: 14 (12/19/22 1406)  BP: (!) 178/95 (12/19/22 1406)  SpO2: 96 % (12/19/22 1406)   Vital Signs (24h Range):  Temp:  [97.5 °F (36.4 °C)] 97.5 °F (36.4 °C)  Pulse:  [69-70] 70  Resp:  [14-20] 14  SpO2:  [96 %-99 %] 96 %  BP: (170-192)/() 178/95     Weight: 84.8 kg (186 lb 15.2 oz) (12/19/22 1221)  Body mass index is 27.61 kg/m².  Body surface area is 2.03 meters squared.    No intake/output data recorded.    Physical Exam  Vitals and nursing note reviewed.   HENT:      Head: Normocephalic.      Mouth/Throat:      Pharynx: Oropharynx is clear.   Eyes:      Conjunctiva/sclera: Conjunctivae normal.   Cardiovascular:      Rate and Rhythm: Normal rate.      Pulses: Normal pulses.   Pulmonary:      Effort: Pulmonary effort is normal.      Breath sounds: Rales present.   Abdominal:      Palpations: Abdomen is soft.   Musculoskeletal:      Cervical back: Neck supple.      Right lower  leg: Edema present.      Left lower leg: Edema present.   Skin:     General: Skin is dry.   Neurological:      Mental Status: He is alert and oriented to person, place, and time.   Psychiatric:         Mood and Affect: Mood normal.         Behavior: Behavior normal.     Significant Labs:  CBC:   Recent Labs   Lab 12/19/22  1356   WBC 3.72*   RBC 3.67*   HGB 10.4*   HCT 34.2*      MCV 93   MCH 28.3   MCHC 30.4*     CMP:   Recent Labs   Lab 12/19/22  1356   *   CALCIUM 7.6*   ALBUMIN 2.7*   PROT 6.6   *   K 5.9*   CO2 21*   CL 94*   BUN 41*   CREATININE 5.5*   ALKPHOS 200*   ALT 35   AST 91*   BILITOT 0.7     All labs within the past 24 hours have been reviewed.

## 2022-12-19 NOTE — CONSULTS
[facility] Emergency Dept  Nephrology  Consult Note    Patient Name: Cosme Lewis  MRN: [id number]  Admission Date: 12/19/2022  Hospital Length of Stay: 0 days  Attending Provider: Len Easton MD   Primary Care Physician: Primary Doctor No  Principal Problem:<principal problem not specified>    Inpatient consult to Nephrology  Consult performed by: Citlalli Simpson DNP  Consult ordered by: Damion Foster MD  Reason for consult: ESRD on HD         Subjective:     HPI: Cosme Lewis is a 57 y.o. male with T1DM, ESRD on HD (MWF), HTN, HFpEF, HLD, chronic hypoxemic resp failure 2/2 COPD (on home O2), h/o DVT/PE on Eliquis transported by EMS from [facility] due to uncontrolled nose bleeding. Anuric at baseline. Chesr x ray Reconfirmed enlargement of cardiopericardial silhouette and central pulmonary vascular congestion.  Reconfirmed scattered the extensive bilateral interstitial and some subtle ground-glass opacities that could be on the basis of edema and or infection. He denies CP, fever, aches, chills, N/V/D, and abdominal pain. K 5.9, however hemolyzed. Hypervolemic on exam. Nephrology consulted for ESRD on HD.       Past Medical History:   Diagnosis Date    Chronic kidney disease-mineral and bone disorder 10/12/2022    COPD (chronic obstructive pulmonary disease)     Diabetes mellitus type 1     ESRD on dialysis     Hypertension     SOB (shortness of breath) 10/12/2022    Patient with history COPD treated with Ellipta the albuterol, fluticasone-salmeterol tachypneic in the ED saturating 94% and 6 L on nasal cannula, patient does not know how many  he uses it is in nursing home.    -duo nebs Q 4  Given that patient is a poor historian, and in the setting of left lower extremity edema and history of coagulopathy PE cannot be ruled out.  Will workup for possible infec       Past Surgical History:   Procedure Laterality Date    AV FISTULA PLACEMENT         Review of patient's allergies  "indicates:  No Known Allergies  Current Facility-Administered Medications   Medication Frequency    0.9%  NaCl infusion Once    insulin regular injection 8 Units 0.08 mL ED 1 Time     Current Outpatient Medications   Medication    acetaminophen (TYLENOL) 650 MG TbSR    albuterol (PROVENTIL/VENTOLIN HFA) 90 mcg/actuation inhaler    apixaban (ELIQUIS) 5 mg Tab    aspirin (ECOTRIN) 81 MG EC tablet    atorvastatin (LIPITOR) 40 MG tablet    carvediloL (COREG) 3.125 MG tablet    cetirizine (ZYRTEC) 10 MG tablet    cloNIDine 0.3 mg/24 hr td ptwk (CATAPRES) 0.3 mg/24 hr    FLUoxetine 40 MG capsule    fluticasone-salmeterol diskus inhaler 250-50 mcg    GLUCAGON EMERGENCY KIT, HUMAN, 1 mg injection    hydrALAZINE (APRESOLINE) 100 MG tablet    HYDROPHILIC CREAM TOP    insulin aspart (NOVOLOG FLEXPEN U-100 INSULIN SUBQ)    insulin aspart U-100 (NOVOLOG) 100 unit/mL (3 mL) InPn pen    insulin detemir U-100 (LEVEMIR FLEXTOUCH) 100 unit/mL (3 mL) SubQ InPn pen    isosorbide mononitrate (IMDUR) 30 MG 24 hr tablet    melatonin 3 mg TbDL    NIFEdipine (PROCARDIA-XL) 90 MG (OSM) 24 hr tablet    pen needle, diabetic 32 gauge x 5/32" Ndle    sodium chloride (OCEAN) 0.65 % nasal spray    tiotropium bromide (SPIRIVA RESPIMAT) 2.5 mcg/actuation inhaler    triamcinolone acetonide 0.025 % Lotn    vitamin renal formula, B-complex-vitamin c-folic acid, (NEPHROCAP) 1 mg Cap     Family History       Problem Relation (Age of Onset)    Diabetes Mother          Tobacco Use    Smoking status: Never    Smokeless tobacco: Never   Substance and Sexual Activity    Alcohol use: Not Currently    Drug use: Not on file    Sexual activity: Not Currently     Review of Systems   Constitutional:  Negative for fever.   HENT: Negative.     Eyes: Negative.    Respiratory:  Positive for shortness of breath.    Cardiovascular: Negative.    Gastrointestinal: Negative.    Endocrine: Negative.    Genitourinary:  Positive for decreased " urine volume.   Musculoskeletal: Negative.    Skin: Negative.    Allergic/Immunologic: Negative.    Neurological: Negative.    Hematological: Negative.    Psychiatric/Behavioral: Negative.     Objective:     Vital Signs (Most Recent):  Temp: 97.5 °F (36.4 °C) (12/19/22 1157)  Pulse: 70 (12/19/22 1406)  Resp: 14 (12/19/22 1406)  BP: (!) 178/95 (12/19/22 1406)  SpO2: 96 % (12/19/22 1406)   Vital Signs (24h Range):  Temp:  [97.5 °F (36.4 °C)] 97.5 °F (36.4 °C)  Pulse:  [69-70] 70  Resp:  [14-20] 14  SpO2:  [96 %-99 %] 96 %  BP: (170-192)/() 178/95     Weight: 84.8 kg (186 lb 15.2 oz) (12/19/22 1221)  Body mass index is 27.61 kg/m².  Body surface area is 2.03 meters squared.    No intake/output data recorded.    Physical Exam  Vitals and nursing note reviewed.   HENT:      Head: Normocephalic.      Mouth/Throat:      Pharynx: Oropharynx is clear.   Eyes:      Conjunctiva/sclera: Conjunctivae normal.   Cardiovascular:      Rate and Rhythm: Normal rate.      Pulses: Normal pulses.   Pulmonary:      Effort: Pulmonary effort is normal.      Breath sounds: Rales present.   Abdominal:      Palpations: Abdomen is soft.   Musculoskeletal:      Cervical back: Neck supple.      Right lower leg: Edema present.      Left lower leg: Edema present.   Skin:     General: Skin is dry.   Neurological:      Mental Status: He is alert and oriented to person, place, and time.   Psychiatric:         Mood and Affect: Mood normal.         Behavior: Behavior normal.     Significant Labs:  CBC:   Recent Labs   Lab 12/19/22  1356   WBC 3.72*   RBC 3.67*   HGB 10.4*   HCT 34.2*      MCV 93   MCH 28.3   MCHC 30.4*     CMP:   Recent Labs   Lab 12/19/22  1356   *   CALCIUM 7.6*   ALBUMIN 2.7*   PROT 6.6   *   K 5.9*   CO2 21*   CL 94*   BUN 41*   CREATININE 5.5*   ALKPHOS 200*   ALT 35   AST 91*   BILITOT 0.7     All labs within the past 24 hours have been reviewed.        Assessment/Plan:     Anemia in ESRD (end-stage renal  disease)  Hgb goal in ESRD 10-11      Chronic kidney disease-mineral and bone disorder  -phos with am labs     ESRD on dialysis  Cosme Lewis is a 57 y.o. male with T1DM, ESRD on HD (MWF), HTN, HFpEF, HLD, chronic hypoxemic resp failure 2/2 COPD (on home O2 - 5L), h/o DVT/PE on Eliquis transported by EMS from [facility] due to uncontrolled nose bleeding. K 5.9 with hemolysis     Outpatient HD Information:     -[facility]  -HD tx days: MWF   -Last HD tx prior to presentation: 12/16/22  -HD access: LUE AVF  -HD modality: iHD   -Residual urine: Anuric         Plan/Recommendations:     -HD today 12/19 for metabolic clearance and volume management  -Renal diet, if not NPO   -Strict I/O's and daily weights  -Daily renal function panels   -Renally dose meds        Thank you for your consult. I will follow-up with patient. Please contact us if you have any additional questions.    Citlalli Simpson DNP  Nephrology  [facility] - Emergency Dept

## 2022-12-19 NOTE — HPI
Cosme Lewis is a 57 y.o. male with T1DM, ESRD on HD (MWF), HTN, HFpEF, HLD, chronic hypoxemic resp failure 2/2 COPD (on home O2), h/o DVT/PE on Eliquis transported by EMS from [facility] due to uncontrolled nose bleeding. Anuric at baseline. Chesr x ray Reconfirmed enlargement of cardiopericardial silhouette and central pulmonary vascular congestion.  Reconfirmed scattered the extensive bilateral interstitial and some subtle ground-glass opacities that could be on the basis of edema and or infection. He denies CP, fever, aches, chills, N/V/D, and abdominal pain. K 5.9, however hemolyzed. Hypervolemic on exam. Nephrology consulted for ESRD on HD.

## 2022-12-19 NOTE — ASSESSMENT & PLAN NOTE
Cosme Lewis is a 57 y.o. male with T1DM, ESRD on HD (MWF), HTN, HFpEF, HLD, chronic hypoxemic resp failure 2/2 COPD (on home O2 - 5L), h/o DVT/PE on Eliquis transported by EMS from [facility] due to uncontrolled nose bleeding. K 5.9 with hemolysis     Outpatient HD Information:     -Outpatient HD unit: [facility]  -HD tx days: MWF   -Last HD tx prior to presentation: 12/16/22  -HD access: LUE AVF  -HD modality: iHD   -Residual urine: Anuric         Plan/Recommendations:     -HD today 12/19 for metabolic clearance and volume management  -Renal diet, if not NPO   -Strict I/O's and daily weights  -Daily renal function panels   -Renally dose meds

## 2022-12-19 NOTE — HPI
"Cosme Lewis is a 58 yo male with ESRD on HD MWF, hx of VTE on eliquis, HFpEF, COPD, diabetes, and hypertension, who presents with intermittent nosebleeding for the last 3 days. Bleeding currently controlled, he has been taking his eliquis. He also c/o SOB that he noticed today with increased LE swelling. He reports he missed his dialysis session today, last session Friday. He denies fevers or chills, cough, chest pain, palpitations, confusion, lethargy, headache, urinary symptoms, NVD.     In the ED: P 70, /92max. RR16-20. Satting 96% on 4L NC. Afebrile. WBC 3.7K. H/H 10.4/34. . Na 129 (Corrected sodium 135). Glucose 499. K 5.9 (with visible hemolysis). BUN 41/ Cr 5.5. BNP 1701.   CXR: "Reconfirmed enlargement of cardiopericardial silhouette and central pulmonary vascular congestion.  Reconfirmed scattered the extensive bilateral interstitial and some subtle ground-glass opacities that could be on the basis of edema and or infection.  Clinical correlation.  Some increasing density about the right mid lung zone-minor fissure that could relate to progressive atelectasis, layered fluid, or infiltrate.  No obvious pleural fluid blunting right or left costophrenic sulci and no right or left pneumothorax." Given 8U insulin.   "

## 2022-12-19 NOTE — H&P
"[facility] - Emergency Dept  [facility] Medicine  History & Physical    Patient Name: Cosme Lewis  MRN: [id number]  Patient Class: OP- Observation  Admission Date: 12/19/2022  Attending Physician: Len Easton MD   Primary Care Provider: Primary Doctor No         Patient information was obtained from patient and ER records.     Subjective:     Principal Problem:Acute on chronic respiratory failure with hypoxia    Chief Complaint:   Chief Complaint   Patient presents with    Multiple Complaints     Nose bleed overnight. On eliquis. Bleeding controlled. Also has a CBG of 500. Denies any complaints.        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 Bleeding currently controlled, he has been taking his eliquis. He also c/o SOB that he noticed today with increased LE swelling. He reports he missed his dialysis session today, last session Friday. He denies fevers or chills, cough, chest pain, palpitations, confusion, lethargy, headache, urinary symptoms, NVD.     In the ED: P 70, /92max. RR16-20. Satting 96% on 4L NC. Afebrile. WBC 3.7K. H/H 10.4/34. . Na 129 (Corrected sodium 135). Glucose 499. K 5.9 (with visible hemolysis). BUN 41/ Cr 5.5. BNP 1701.   CXR: "Reconfirmed enlargement of cardiopericardial silhouette and central pulmonary vascular congestion.  Reconfirmed scattered the extensive bilateral interstitial and some subtle ground-glass opacities that could be on the basis of edema and or infection.  Clinical correlation.  Some increasing density about the right mid lung zone-minor fissure that could relate to progressive atelectasis, layered fluid, or infiltrate.  No obvious pleural fluid blunting right or left costophrenic sulci and no right or left pneumothorax." Given 8U insulin.       Past Medical History:   Diagnosis Date    Chronic kidney disease-mineral and " bone disorder 10/12/2022    COPD (chronic obstructive pulmonary disease)     Diabetes mellitus type 1     ESRD on dialysis     Hypertension     SOB (shortness of breath) 10/12/2022    Patient with history COPD treated with Ellipta the albuterol, fluticasone-salmeterol tachypneic in the ED saturating 94% and 6 L on nasal cannula, patient does not know how many  he uses it is in nursing home.    -duo nebs Q 4  Given that patient is a poor historian, and in the setting of left lower extremity edema and history of coagulopathy PE cannot be ruled out.  Will workup for possible infec       Past Surgical History:   Procedure Laterality Date    AV FISTULA PLACEMENT         Review of patient's allergies indicates:  No Known Allergies    No current facility-administered medications on file prior to encounter.     Current Outpatient Medications on File Prior to Encounter   Medication Sig    acetaminophen (TYLENOL) 650 MG TbSR Take 650 mg by mouth every 8 (eight) hours as needed (pain or fever over 100.4).    albuterol (PROVENTIL/VENTOLIN HFA) 90 mcg/actuation inhaler Inhale 2 puffs into the lungs every 6 (six) hours as needed for Wheezing or Shortness of Breath.    apixaban (ELIQUIS) 5 mg Tab Take 1 tablet (5 mg total) by mouth 2 (two) times daily. Start this dose AFTER you have completed the 7 days of the 10 mg dose.    aspirin (ECOTRIN) 81 MG EC tablet Take 81 mg by mouth once daily.    atorvastatin (LIPITOR) 40 MG tablet Take 1 tablet (40 mg total) by mouth once daily.    carvediloL (COREG) 3.125 MG tablet Take 2 tablets (6.25 mg total) by mouth 2 (two) times daily.    cetirizine (ZYRTEC) 10 MG tablet Take 10 mg by mouth daily as needed (itching).    cloNIDine 0.3 mg/24 hr td ptwk (CATAPRES) 0.3 mg/24 hr Place 1 patch onto the skin every Saturday.    FLUoxetine 40 MG capsule Take 40 mg by mouth once daily.    fluticasone-salmeterol diskus inhaler 250-50 mcg Inhale 1 puff into the lungs 2 (two) times daily.  "   GLUCAGON EMERGENCY KIT, HUMAN, 1 mg injection 1 mg into the muscle as needed if CBG < 70    hydrALAZINE (APRESOLINE) 100 MG tablet Take 1 tablet (100 mg total) by mouth every 8 (eight) hours.    HYDROPHILIC CREAM TOP Apply topically. Apply liberal amount to affected area twice daily for dry skin    insulin aspart (NOVOLOG FLEXPEN U-100 INSULIN SUBQ) Inject into the skin before meals and at bedtime per sliding scale: 0-60= OJ or glucose tab; = 0; 201-250= 2; 251-300= 4; 301-350= 6; 351-400= 8; greater than 400= 10 units then call MD    insulin aspart U-100 (NOVOLOG) 100 unit/mL (3 mL) InPn pen Inject 10 Units into the skin 3 (three) times daily with meals. Max 30 units per day    insulin detemir U-100 (LEVEMIR FLEXTOUCH) 100 unit/mL (3 mL) SubQ InPn pen Inject 15 Units into the skin 2 (two) times daily. Once in the morning and once before bedtime. Max 30 units per day    isosorbide mononitrate (IMDUR) 30 MG 24 hr tablet Take 30 mg by mouth 2 (two) times daily.    melatonin 3 mg TbDL Take 9 mg by mouth nightly as needed (sleep).    NIFEdipine (PROCARDIA-XL) 90 MG (OSM) 24 hr tablet Take 1 tablet (90 mg total) by mouth once daily.    pen needle, diabetic 32 gauge x 5/32" Ndle Use to inject 1 each into the skin 5 (five) times daily.    sodium chloride (OCEAN) 0.65 % nasal spray 2 sprays by Nasal route every 4 (four) hours as needed for Congestion.    tiotropium bromide (SPIRIVA RESPIMAT) 2.5 mcg/actuation inhaler Inhale 2 puffs into the lungs Daily.    triamcinolone acetonide 0.025 % Lotn Apply topically. Apply to the affected area twice daily    vitamin renal formula, B-complex-vitamin c-folic acid, (NEPHROCAP) 1 mg Cap Take 1 capsule by mouth once daily.     Family History       Problem Relation (Age of Onset)    Diabetes Mother          Tobacco Use    Smoking status: Never    Smokeless tobacco: Never   Substance and Sexual Activity    Alcohol use: Not Currently    Drug use: Not on file    " Sexual activity: Not Currently     Review of Systems   Constitutional:  Positive for fatigue. Negative for activity change, chills and fever.   HENT:  Negative for trouble swallowing.    Eyes:  Negative for photophobia and visual disturbance.   Respiratory:  Positive for shortness of breath. Negative for chest tightness and wheezing.    Cardiovascular:  Negative for chest pain, palpitations and leg swelling.   Gastrointestinal:  Negative for abdominal pain, constipation, diarrhea, nausea and vomiting.   Genitourinary:  Positive for decreased urine volume. Negative for dysuria, frequency, hematuria and urgency.   Musculoskeletal:  Negative for arthralgias, back pain and gait problem.   Skin:  Negative for color change and rash.   Neurological:  Negative for dizziness, syncope, weakness, light-headedness, numbness and headaches.   Psychiatric/Behavioral:  Negative for agitation and confusion. The patient is not nervous/anxious.    Objective:     Vital Signs (Most Recent):  Temp: 97.7 °F (36.5 °C) (12/19/22 1645)  Pulse: 72 (12/19/22 1645)  Resp: 20 (12/19/22 1630)  BP: (!) 129/102 (12/19/22 1645)  SpO2: (!) 93 % (12/19/22 1645)   Vital Signs (24h Range):  Temp:  [97.5 °F (36.4 °C)-97.7 °F (36.5 °C)] 97.7 °F (36.5 °C)  Pulse:  [69-79] 72  Resp:  [14-20] 20  SpO2:  [93 %-99 %] 93 %  BP: (129-192)/() 129/102     Weight: 84.8 kg (186 lb 15.2 oz)  Body mass index is 27.61 kg/m².    Physical Exam  Vitals and nursing note reviewed.   Constitutional:       General: He is not in acute distress.     Appearance: He is well-developed. He is ill-appearing. He is not diaphoretic.   HENT:      Head: Normocephalic and atraumatic.      Right Ear: External ear normal.      Left Ear: External ear normal.      Nose: Nose normal. No congestion.      Mouth/Throat:      Pharynx: Oropharynx is clear.   Eyes:      General: No scleral icterus.     Extraocular Movements: Extraocular movements intact.   Cardiovascular:      Rate and  Rhythm: Normal rate and regular rhythm.      Pulses: Normal pulses.      Heart sounds: Normal heart sounds. No murmur heard.  Pulmonary:      Effort: Pulmonary effort is normal. No respiratory distress.      Breath sounds: Rales present. No wheezing or rhonchi.      Comments: Rales in BLL  Satting 97% on 5L NC.   Abdominal:      General: Bowel sounds are normal. There is no distension.      Palpations: Abdomen is soft.      Tenderness: There is no abdominal tenderness. There is no guarding or rebound.   Musculoskeletal:      Cervical back: Normal range of motion.      Right lower leg: Edema (2+) present.      Left lower leg: Edema (2+) present.   Skin:     General: Skin is warm and dry.      Capillary Refill: Capillary refill takes less than 2 seconds.   Neurological:      General: No focal deficit present.      Mental Status: He is alert and oriented to person, place, and time. Mental status is at baseline.   Psychiatric:         Mood and Affect: Mood normal.         Behavior: Behavior normal.           Significant Labs: All pertinent labs within the past 24 hours have been reviewed.  CBC:   Recent Labs   Lab 12/19/22  1356   WBC 3.72*   HGB 10.4*   HCT 34.2*        CMP:   Recent Labs   Lab 12/19/22  1356   *   K 5.9*   CL 94*   CO2 21*   *   BUN 41*   CREATININE 5.5*   CALCIUM 7.6*   PROT 6.6   ALBUMIN 2.7*   BILITOT 0.7   ALKPHOS 200*   AST 91*   ALT 35   ANIONGAP 14     Cardiac Markers:   Recent Labs   Lab 12/19/22  1356   BNP 1,701*       Significant Imaging: I have reviewed all pertinent imaging results/findings within the past 24 hours.    Imaging Results              X-Ray Chest AP Portable (Final result)  Result time 12/19/22 14:13:09      Final result by Jasson Simmons MD (12/19/22 14:13:09)                   Impression:      As above      Electronically signed by: Jasson Simmons  Date:    12/19/2022  Time:    14:13               Narrative:    EXAMINATION:  XR CHEST AP  PORTABLE    CLINICAL HISTORY:  Shortness of breath    TECHNIQUE:  Single frontal view of the chest was performed.    COMPARISON:  November 25, 2022    FINDINGS:  Reconfirmed enlargement of cardiopericardial silhouette and central pulmonary vascular congestion.  Reconfirmed scattered the extensive bilateral interstitial and some subtle ground-glass opacities that could be on the basis of edema and or infection.  Clinical correlation.  Some increasing density about the right mid lung zone-minor fissure that could relate to progressive atelectasis, layered fluid, or infiltrate.  No obvious pleural fluid blunting right or left costophrenic sulci and no right or left pneumothorax.                                        Assessment/Plan:     * Acute on chronic respiratory failure with hypoxia  Patient with Hypoxic Respiratory failure which is Acute on chronic.  he is on home oxygen at 2 LPM. Supplemental oxygen was provided and noted- 4-5L NC  .   Signs/symptoms of respiratory failure include- tachypnea, increased work of breathing and wheezing. Contributing diagnoses includes - CHF and Hypervolemia Labs and images were reviewed. Patient Has not had a recent ABG. Will treat underlying causes and adjust management of respiratory failure as follows-  - Volume management with HD  - see CHF    HLD (hyperlipidemia)  - continue statin      Renovascular hypertension  - continue clonidine, nifedipine, hydralazine, coreg, imdur      Anemia in ESRD (end-stage renal disease)  - stable, at baseline  - nose bleeding controlled   - continue eliquis for now, monitor daily CBC      Acute on chronic diastolic heart failure  Pulmonary hypertension    · CHF Pathway initiated  · Recent 2D echo with grade 2 diastolic heart failure, preserved EF, and moderate-severe pulmonary hypertension (see below)  · BNP >1700 and CXR with pulmonary edema  · Defer to nephrology for volume control with HD  · Strict I&Os with daily weights. Admit weight 186  lb.   · Continue asa, statin, coreg, imdur, BP control    Results for orders placed during the hospital encounter of 11/18/22    Echo    Interpretation Summary  · The left ventricle is normal in size with mild concentric hypertrophy and increased density and normal systolic function.  · The estimated ejection fraction is 63%.  · Grade II left ventricular diastolic dysfunction.  · Severe left atrial enlargement.  · Mild right ventricular enlargement with low normal right ventricular systolic function.  · There is right ventricular hypertrophy.  · Moderate right atrial enlargement.  · Mild to moderate tricuspid regurgitation.  · Mild pulmonic regurgitation.  · Elevated central venous pressure (15 mmHg).  · The estimated PA systolic pressure is 67 mmHg.  · There is moderate-severe pulmonary hypertension.  · Trivial posterolateral pericardial effusion.  · There is a moderate left pleural effusion.  · There may be some ascites present.      Other emphysema  - CXR reviewed  - does not appear to be in COPD exacerbation at this time; presentation more c/w CHF/ volume overload  - continue LAMA and ICS-LABA inhalers      Type 1 diabetes mellitus with chronic kidney disease on chronic dialysis  Patient's FSGs are uncontrolled due to hyperglycemia on current medication regimen.  Last A1c reviewed-   Lab Results   Component Value Date    HGBA1C 11.9 (H) 10/12/2022     Most recent fingerstick glucose reviewed-   Recent Labs   Lab 12/19/22  1216   POCTGLUCOSE 476*     Current correctional scale  Low  Maintain anti-hyperglycemic dose as follows-   Antihyperglycemics (From admission, onward)    Start     Stop Route Frequency Ordered    12/19/22 2100  insulin detemir U-100 pen 15 Units         -- SubQ 2 times daily 12/19/22 1717    12/19/22 1730  insulin aspart U-100 pen 10 Units         -- SubQ 3 times daily with meals 12/19/22 1717    12/19/22 1724  insulin aspart U-100 pen 0-5 Units         -- SubQ Before meals & nightly PRN  12/19/22 1627        Hold Oral hypoglycemics while patient is in the hospital.  - beta-hydroxybutyrate pending  - UA ordered  - endocrinology consulted for A1C >11, appreciate assistance    ESRD on dialysis  CKD mineral and bone disorder    Creatine and BUN elevated from baseline on admit. BMP reviewed- noted Estimated Creatinine Clearance: 14.5 mL/min (A) (based on SCr of 5.5 mg/dL (H)). according to latest data. Monitor UOP and serial BMP and adjust therapy as needed. Renally dose meds.  - trend renal function panel   - continue daily renal vitamin  - Nephrology consulted for HD assistance            VTE Risk Mitigation (From admission, onward)         Ordered     apixaban tablet 5 mg  2 times daily         12/19/22 1627     IP VTE HIGH RISK PATIENT  Once         12/19/22 1627     Place sequential compression device  Until discontinued         12/19/22 1627                   YOBANY MunizC  Department of Hospital Medicine   [facility] - Emergency Dept

## 2022-12-19 NOTE — ASSESSMENT & PLAN NOTE
Pulmonary hypertension    · CHF Pathway initiated  · Recent 2D echo with grade 2 diastolic heart failure, preserved EF, and moderate-severe pulmonary hypertension (see below)  · BNP >1700 and CXR with pulmonary edema  · Defer to nephrology for volume control with HD  · Strict I&Os with daily weights. Admit weight 186 lb.   · Continue asa, statin, coreg, imdur, BP control    Results for orders placed during the hospital encounter of 11/18/22    Echo    Interpretation Summary  · The left ventricle is normal in size with mild concentric hypertrophy and increased density and normal systolic function.  · The estimated ejection fraction is 63%.  · Grade II left ventricular diastolic dysfunction.  · Severe left atrial enlargement.  · Mild right ventricular enlargement with low normal right ventricular systolic function.  · There is right ventricular hypertrophy.  · Moderate right atrial enlargement.  · Mild to moderate tricuspid regurgitation.  · Mild pulmonic regurgitation.  · Elevated central venous pressure (15 mmHg).  · The estimated PA systolic pressure is 67 mmHg.  · There is moderate-severe pulmonary hypertension.  · Trivial posterolateral pericardial effusion.  · There is a moderate left pleural effusion.  · There may be some ascites present.

## 2022-12-19 NOTE — ASSESSMENT & PLAN NOTE
Patient's FSGs are uncontrolled due to hyperglycemia on current medication regimen.  Last A1c reviewed-   Lab Results   Component Value Date    HGBA1C 11.9 (H) 10/12/2022     Most recent fingerstick glucose reviewed-   Recent Labs   Lab 12/19/22  1216   POCTGLUCOSE 476*     Current correctional scale  Low  Maintain anti-hyperglycemic dose as follows-   Antihyperglycemics (From admission, onward)    Start     Stop Route Frequency Ordered    12/19/22 2100  insulin detemir U-100 pen 15 Units         -- SubQ 2 times daily 12/19/22 1717    12/19/22 1730  insulin aspart U-100 pen 10 Units         -- SubQ 3 times daily with meals 12/19/22 1717    12/19/22 1724  insulin aspart U-100 pen 0-5 Units         -- SubQ Before meals & nightly PRN 12/19/22 1627        Hold Oral hypoglycemics while patient is in the hospital.  - beta-hydroxybutyrate pending  - UA ordered  - endocrinology consulted for A1C >11, appreciate assistance

## 2022-12-19 NOTE — ASSESSMENT & PLAN NOTE
Patient with Hypoxic Respiratory failure which is Acute on chronic.  he is on home oxygen at 2 LPM. Supplemental oxygen was provided and noted- 4-5L NC  .   Signs/symptoms of respiratory failure include- tachypnea, increased work of breathing and wheezing. Contributing diagnoses includes - CHF and Hypervolemia Labs and images were reviewed. Patient Has not had a recent ABG. Will treat underlying causes and adjust management of respiratory failure as follows-  - Volume management with HD  - see CHF

## 2022-12-19 NOTE — ED NOTES
Patient placed in hospital gown. Patient placed on cardiac monitor, bp cuff, and continuous pulse ox. Call light within reach. White board updated with patient plan of care.

## 2022-12-19 NOTE — ASSESSMENT & PLAN NOTE
- CXR reviewed  - does not appear to be in COPD exacerbation at this time; presentation more c/w CHF/ volume overload  - continue LAMA and ICS-LABA inhalers

## 2022-12-19 NOTE — ED PROVIDER NOTES
Encounter Date: 12/19/2022       History     Chief Complaint   Patient presents with    Multiple Complaints     Nose bleed overnight. On eliquis. Bleeding controlled. Also has a CBG of 500. Denies any complaints.     LENA Aguiar is a 57-year-old male with past medical history of end-stage renal disease on dialysis Monday Wednesday Friday, last dialysis Friday, HFpEF, COPD, diabetes, hypertension who presents with nosebleed that has been occurring intermittently for the last 3 days.  Currently bleeding is controlled.  He states a lot of blood has come out, states that he has had a lot of blood on his bed sheets.  He also states he is feeling short of breath today.  He has increasing swelling in his legs, he feels like it is more in his left than his right.  Denies any pain anywhere.  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    Review of patient's allergies indicates:  No Known Allergies  Past Medical History:   Diagnosis Date    Chronic kidney disease-mineral and bone disorder 10/12/2022    COPD (chronic obstructive pulmonary disease)     Diabetes mellitus type 1     ESRD on dialysis     Hypertension     SOB (shortness of breath) 10/12/2022    Patient with history COPD treated with Ellipta the albuterol, fluticasone-salmeterol tachypneic in the ED saturating 94% and 6 L on nasal cannula, patient does not know how many  he uses it is in nursing home.    -duo roxi Q 4  Given that patient is a poor historian, and in the setting of left lower extremity edema and history of coagulopathy PE cannot be ruled out.  Will workup for possible infec     Past Surgical History:   Procedure Laterality Date    AV FISTULA PLACEMENT       Family History   Problem Relation Age of Onset    Diabetes Mother      Social History     Tobacco Use    Smoking status: Never    Smokeless tobacco: Never   Substance Use Topics    Alcohol use: Not Currently     Review of Systems  Constitutional: Denies fever  HENT: Denies sore throat, +  epistaxis  Eyes: Denies eye pain  Respiratory: + shortness of breath  CV: Denies chest pain  GI: Denies abdominal pain, N/V/D  : Denies dysuria  MSK: Denies joint pain  Skin: Denies rash  Neuro: Denies headache    Physical Exam     Initial Vitals [12/19/22 1157]   BP Pulse Resp Temp SpO2   (!) 170/90 70 20 97.5 °F (36.4 °C) 99 %      MAP       --         Physical Exam  General: Awake and alert, well-nourished  HENT: moist mucous membranes, no active epistaxis  Eyes: No conjunctival injection  Pulm: mild tachypnea, CTAB, no increased work of breathing  CV: Regular rate and rhythm, no murmur noted  Abdomen: mildly distended, non-tender to palpation  MSK: 1+ bilateral LE pitting edema  Skin: No rash noted  Neuro: No facial asymmetry, grossly normal movements of arms and legs  Psychiatric: Cooperative    ED Course   Procedures  Labs Reviewed   CBC W/ AUTO DIFFERENTIAL - Abnormal; Notable for the following components:       Result Value    WBC 3.72 (*)     RBC 3.67 (*)     Hemoglobin 10.4 (*)     Hematocrit 34.2 (*)     MCHC 30.4 (*)     RDW 17.4 (*)     Lymph # 0.5 (*)     Gran % 74.2 (*)     Lymph % 14.0 (*)     All other components within normal limits   COMPREHENSIVE METABOLIC PANEL - Abnormal; Notable for the following components:    Sodium 129 (*)     Potassium 5.9 (*)     Chloride 94 (*)     CO2 21 (*)     Glucose 499 (*)     BUN 41 (*)     Creatinine 5.5 (*)     Calcium 7.6 (*)     Albumin 2.7 (*)     Alkaline Phosphatase 200 (*)     AST 91 (*)     eGFR 11.2 (*)     All other components within normal limits   B-TYPE NATRIURETIC PEPTIDE - Abnormal; Notable for the following components:    BNP 1,701 (*)     All other components within normal limits   HIV 1 / 2 ANTIBODY    Narrative:     Release to patient->Immediate   BETA - HYDROXYBUTYRATE, SERUM   BETA - HYDROXYBUTYRATE, SERUM   POCT GLUCOSE MONITORING CONTINUOUS     EKG Readings: (Independently Interpreted)   Sinus rhythm, normal axis, 1st degree heart block,  prolonged QT interval, narrow QRS, nonspecific T wave flattening in aVL, otherwise no acute ST segment changes to suggest ischemia.  Poor R wave progression without other signs of acute ischemia. No STEMI.     Imaging Results              X-Ray Chest AP Portable (Final result)  Result time 12/19/22 14:13:09      Final result by Jasson Simmons MD (12/19/22 14:13:09)                   Impression:      As above      Electronically signed by: Jasson Simmons  Date:    12/19/2022  Time:    14:13               Narrative:    EXAMINATION:  XR CHEST AP PORTABLE    CLINICAL HISTORY:  Shortness of breath    TECHNIQUE:  Single frontal view of the chest was performed.    COMPARISON:  November 25, 2022    FINDINGS:  Reconfirmed enlargement of cardiopericardial silhouette and central pulmonary vascular congestion.  Reconfirmed scattered the extensive bilateral interstitial and some subtle ground-glass opacities that could be on the basis of edema and or infection.  Clinical correlation.  Some increasing density about the right mid lung zone-minor fissure that could relate to progressive atelectasis, layered fluid, or infiltrate.  No obvious pleural fluid blunting right or left costophrenic sulci and no right or left pneumothorax.                                       Medications   insulin regular injection 8 Units 0.08 mL (has no administration in time range)   0.9%  NaCl infusion (has no administration in time range)     Medical Decision Making:   Initial Assessment:   Well appearing overall. Severe htn and seems somewhat volume overloaded on exam. Likely needs dialysis.  On 4L O2 via NC.    Differential Diagnosis:   Pulmonary edema, electrolyte abnormality, CHF exacerbation, COPD exacerbation, PE less likely given no chest pain and alternative explanation of shortness of breath with volume overload, ACS, pneumothorax  Clinical Tests:   Lab Tests: Ordered and Reviewed  Radiological Study: Reviewed and Ordered  ED Management:  CXR  suggestive of moderate pulmonary edema, contacted nephrology for acute need for dialysis.  Insulin given for hyperglycemia without signs of DKA.  CBC without acute findings.  Admitted for dialysis and further management.                        Clinical Impression:   Final diagnoses:  [R06.02] Shortness of breath               Damion Foster MD  12/20/22 [id number]

## 2022-12-19 NOTE — SUBJECTIVE & OBJECTIVE
Past Medical History:   Diagnosis Date    Chronic kidney disease-mineral and bone disorder 10/12/2022    COPD (chronic obstructive pulmonary disease)     Diabetes mellitus type 1     ESRD on dialysis     Hypertension     SOB (shortness of breath) 10/12/2022    Patient with history COPD treated with Ellipta the albuterol, fluticasone-salmeterol tachypneic in the ED saturating 94% and 6 L on nasal cannula, patient does not know how many  he uses it is in nursing home.    -duo nebs Q 4  Given that patient is a poor historian, and in the setting of left lower extremity edema and history of coagulopathy PE cannot be ruled out.  Will workup for possible infec       Past Surgical History:   Procedure Laterality Date    AV FISTULA PLACEMENT         Review of patient's allergies indicates:  No Known Allergies    No current facility-administered medications on file prior to encounter.     Current Outpatient Medications on File Prior to Encounter   Medication Sig    acetaminophen (TYLENOL) 650 MG TbSR Take 650 mg by mouth every 8 (eight) hours as needed (pain or fever over 100.4).    albuterol (PROVENTIL/VENTOLIN HFA) 90 mcg/actuation inhaler Inhale 2 puffs into the lungs every 6 (six) hours as needed for Wheezing or Shortness of Breath.    apixaban (ELIQUIS) 5 mg Tab Take 1 tablet (5 mg total) by mouth 2 (two) times daily. Start this dose AFTER you have completed the 7 days of the 10 mg dose.    aspirin (ECOTRIN) 81 MG EC tablet Take 81 mg by mouth once daily.    atorvastatin (LIPITOR) 40 MG tablet Take 1 tablet (40 mg total) by mouth once daily.    carvediloL (COREG) 3.125 MG tablet Take 2 tablets (6.25 mg total) by mouth 2 (two) times daily.    cetirizine (ZYRTEC) 10 MG tablet Take 10 mg by mouth daily as needed (itching).    cloNIDine 0.3 mg/24 hr td ptwk (CATAPRES) 0.3 mg/24 hr Place 1 patch onto the skin every Saturday.    FLUoxetine 40 MG capsule Take 40 mg by mouth once daily.    fluticasone-salmeterol diskus inhaler  "250-50 mcg Inhale 1 puff into the lungs 2 (two) times daily.    GLUCAGON EMERGENCY KIT, HUMAN, 1 mg injection 1 mg into the muscle as needed if CBG < 70    hydrALAZINE (APRESOLINE) 100 MG tablet Take 1 tablet (100 mg total) by mouth every 8 (eight) hours.    HYDROPHILIC CREAM TOP Apply topically. Apply liberal amount to affected area twice daily for dry skin    insulin aspart (NOVOLOG FLEXPEN U-100 INSULIN SUBQ) Inject into the skin before meals and at bedtime per sliding scale: 0-60= OJ or glucose tab; = 0; 201-250= 2; 251-300= 4; 301-350= 6; 351-400= 8; greater than 400= 10 units then call MD    insulin aspart U-100 (NOVOLOG) 100 unit/mL (3 mL) InPn pen Inject 10 Units into the skin 3 (three) times daily with meals. Max 30 units per day    insulin detemir U-100 (LEVEMIR FLEXTOUCH) 100 unit/mL (3 mL) SubQ InPn pen Inject 15 Units into the skin 2 (two) times daily. Once in the morning and once before bedtime. Max 30 units per day    isosorbide mononitrate (IMDUR) 30 MG 24 hr tablet Take 30 mg by mouth 2 (two) times daily.    melatonin 3 mg TbDL Take 9 mg by mouth nightly as needed (sleep).    NIFEdipine (PROCARDIA-XL) 90 MG (OSM) 24 hr tablet Take 1 tablet (90 mg total) by mouth once daily.    pen needle, diabetic 32 gauge x 5/32" Ndle Use to inject 1 each into the skin 5 (five) times daily.    sodium chloride (OCEAN) 0.65 % nasal spray 2 sprays by Nasal route every 4 (four) hours as needed for Congestion.    tiotropium bromide (SPIRIVA RESPIMAT) 2.5 mcg/actuation inhaler Inhale 2 puffs into the lungs Daily.    triamcinolone acetonide 0.025 % Lotn Apply topically. Apply to the affected area twice daily    vitamin renal formula, B-complex-vitamin c-folic acid, (NEPHROCAP) 1 mg Cap Take 1 capsule by mouth once daily.     Family History       Problem Relation (Age of Onset)    Diabetes Mother          Tobacco Use    Smoking status: Never    Smokeless tobacco: Never   Substance and Sexual Activity    Alcohol use: " Not Currently    Drug use: Not on file    Sexual activity: Not Currently     Review of Systems   Constitutional:  Positive for fatigue. Negative for activity change, chills and fever.   HENT:  Negative for trouble swallowing.    Eyes:  Negative for photophobia and visual disturbance.   Respiratory:  Positive for shortness of breath. Negative for chest tightness and wheezing.    Cardiovascular:  Negative for chest pain, palpitations and leg swelling.   Gastrointestinal:  Negative for abdominal pain, constipation, diarrhea, nausea and vomiting.   Genitourinary:  Positive for decreased urine volume. Negative for dysuria, frequency, hematuria and urgency.   Musculoskeletal:  Negative for arthralgias, back pain and gait problem.   Skin:  Negative for color change and rash.   Neurological:  Negative for dizziness, syncope, weakness, light-headedness, numbness and headaches.   Psychiatric/Behavioral:  Negative for agitation and confusion. The patient is not nervous/anxious.    Objective:     Vital Signs (Most Recent):  Temp: 97.7 °F (36.5 °C) (12/19/22 1645)  Pulse: 72 (12/19/22 1645)  Resp: 20 (12/19/22 1630)  BP: (!) 129/102 (12/19/22 1645)  SpO2: (!) 93 % (12/19/22 1645)   Vital Signs (24h Range):  Temp:  [97.5 °F (36.4 °C)-97.7 °F (36.5 °C)] 97.7 °F (36.5 °C)  Pulse:  [69-79] 72  Resp:  [14-20] 20  SpO2:  [93 %-99 %] 93 %  BP: (129-192)/() 129/102     Weight: 84.8 kg (186 lb 15.2 oz)  Body mass index is 27.61 kg/m².    Physical Exam  Vitals and nursing note reviewed.   Constitutional:       General: He is not in acute distress.     Appearance: He is well-developed. He is ill-appearing. He is not diaphoretic.   HENT:      Head: Normocephalic and atraumatic.      Right Ear: External ear normal.      Left Ear: External ear normal.      Nose: Nose normal. No congestion.      Mouth/Throat:      Pharynx: Oropharynx is clear.   Eyes:      General: No scleral icterus.     Extraocular Movements: Extraocular movements  intact.   Cardiovascular:      Rate and Rhythm: Normal rate and regular rhythm.      Pulses: Normal pulses.      Heart sounds: Normal heart sounds. No murmur heard.  Pulmonary:      Effort: Pulmonary effort is normal. No respiratory distress.      Breath sounds: Rales present. No wheezing or rhonchi.      Comments: Rales in BLL  Satting 97% on 5L NC.   Abdominal:      General: Bowel sounds are normal. There is no distension.      Palpations: Abdomen is soft.      Tenderness: There is no abdominal tenderness. There is no guarding or rebound.   Musculoskeletal:      Cervical back: Normal range of motion.      Right lower leg: Edema (2+) present.      Left lower leg: Edema (2+) present.   Skin:     General: Skin is warm and dry.      Capillary Refill: Capillary refill takes less than 2 seconds.   Neurological:      General: No focal deficit present.      Mental Status: He is alert and oriented to person, place, and time. Mental status is at baseline.   Psychiatric:         Mood and Affect: Mood normal.         Behavior: Behavior normal.           Significant Labs: All pertinent labs within the past 24 hours have been reviewed.  CBC:   Recent Labs   Lab 12/19/22  1356   WBC 3.72*   HGB 10.4*   HCT 34.2*        CMP:   Recent Labs   Lab 12/19/22  1356   *   K 5.9*   CL 94*   CO2 21*   *   BUN 41*   CREATININE 5.5*   CALCIUM 7.6*   PROT 6.6   ALBUMIN 2.7*   BILITOT 0.7   ALKPHOS 200*   AST 91*   ALT 35   ANIONGAP 14     Cardiac Markers:   Recent Labs   Lab 12/19/22  1356   BNP 1,701*       Significant Imaging: I have reviewed all pertinent imaging results/findings within the past 24 hours.    Imaging Results              X-Ray Chest AP Portable (Final result)  Result time 12/19/22 14:13:09      Final result by Jasson Simmons MD (12/19/22 14:13:09)                   Impression:      As above      Electronically signed by: Jasson Simmons  Date:    12/19/2022  Time:    14:13               Narrative:     EXAMINATION:  XR CHEST AP PORTABLE    CLINICAL HISTORY:  Shortness of breath    TECHNIQUE:  Single frontal view of the chest was performed.    COMPARISON:  November 25, 2022    FINDINGS:  Reconfirmed enlargement of cardiopericardial silhouette and central pulmonary vascular congestion.  Reconfirmed scattered the extensive bilateral interstitial and some subtle ground-glass opacities that could be on the basis of edema and or infection.  Clinical correlation.  Some increasing density about the right mid lung zone-minor fissure that could relate to progressive atelectasis, layered fluid, or infiltrate.  No obvious pleural fluid blunting right or left costophrenic sulci and no right or left pneumothorax.

## 2022-12-19 NOTE — ED TRIAGE NOTES
Patient presents to the ER via EMS from [facility] after having a nose bleed for 3 days. Bleeding controlled at this time. Patient denies any pain. Upon EMS arrival patients CBG >500. Patient takes eliquis daily and is a M, W, F dialysis patient. Patient wheel chair bound at baseline.

## 2022-12-19 NOTE — ASSESSMENT & PLAN NOTE
- stable, at baseline  - nose bleeding controlled   - continue eliquis for now, monitor daily CBC

## 2022-12-19 NOTE — ASSESSMENT & PLAN NOTE
CKD mineral and bone disorder    Creatine and BUN elevated from baseline on admit. BMP reviewed- noted Estimated Creatinine Clearance: 14.5 mL/min (A) (based on SCr of 5.5 mg/dL (H)). according to latest data. Monitor UOP and serial BMP and adjust therapy as needed. Renally dose meds.  - trend renal function panel   - continue daily renal vitamin  - Nephrology consulted for HD assistance

## 2022-12-20 PROBLEM — E11.10 DKA (DIABETIC KETOACIDOSIS): Status: ACTIVE | Noted: 2022-01-01

## 2022-12-20 NOTE — CONSULTS
[facility] - [facility] Step[facility]  Endocrinology  Diabetes Consult Note    Consult Requested by: Len Easton MD   Reason for admit: Acute on chronic respiratory failure with hypoxia    HISTORY OF PRESENT ILLNESS:  Mr. Cosme Lewis is a 57 year old male with medical history significant for hypertension, COPD, diabetes mellitus complicated by ESRD on HD MWF.  He presented to the hospital from his nursing home due to concerns for a nose bleed and acute on chronic respiratory failure.  He was found to have blood sugar greater than 500 with elevated beta hydroxybutyrate, low bicarb and anion gap of 14.  He met criteria, although somewhat mild and was placed back on his outpatient MDI insulin regimen.  Endocrine was then consulted for further assistance in glucose management    Mr. Nowak is a poor historian but states that he was diagnosed with diabetes mellitus around 3 years ago although labs suggest he has been a diabetic since at least .  Uncertain if type 1 or type 2 based on chart review.  He is uncertain of his insulin regimen at nursing facility but he knows he is on both prandial and basal insulin.  He reports that he has had some low blood sugars at the NH.  He admits that his ESRD is a complication of his DM.  Family history includes his mother who was diagnosed with DM.        Outpatient Regimen (Per chart review):  Levemir 15 units BID  Novolog 10 units TIDAC  Low dose sliding scale insulin    Lab Results   Component Value Date    HGBA1C 7.5 (H) 2022             Overnight events: New admission overnight with labs meeting DKA criteria, treated with subq insulin.  Improved glucose this AM.    Eatin%  Nausea: No  Hypoglycemia and intervention: No  Fever: No  TPN and/or TF: No  If yes, type of TF/TPN and rate: N/A     PMH, PSH, FH, SH updated and reviewed      ROS:  As above     Current Medications and/or Treatments Impacting Glycemic Control  Immunotherapy:    Immunosuppressants         None              Steroids:   Hormones (From admission, onward)        Start     Stop Route Frequency Ordered     12/19/22 1724   melatonin tablet 9 mg         -- Oral Nightly PRN 12/19/22 1627             Pressors:    Autonomic Drugs (From admission, onward)        None             Hyperglycemia/Diabetes Medications:   Antihyperglycemics (From admission, onward)        Start     Stop Route Frequency Ordered     12/20/22 2100   insulin detemir U-100 pen 10 Units         -- SubQ 2 times daily 12/20/22 1045     12/20/22 1200   insulin aspart U-100 pen 7 Units         -- SubQ 3 times daily with meals 12/20/22 1045     12/19/22 1724   insulin aspart U-100 pen 0-5 Units         -- SubQ Before meals & nightly PRN 12/19/22 1627                PHYSICAL EXAMINATION:      Vitals:     12/20/22 0817   BP:     Pulse:     Resp:     Temp: (!) 36.5 °F (2.5 °C)      Body mass index is 25.39 kg/m².     Physical Exam  Vitals reviewed.   Constitutional:       Appearance: Normal appearance.   Eyes:      Extraocular Movements: Extraocular movements intact.   Cardiovascular:      Rate and Rhythm: Normal rate.   Pulmonary:      Effort: Pulmonary effort is normal.   Neurological:      General: No focal deficit present.      Mental Status: He is alert.   Psychiatric:         Mood and Affect: Mood normal.         Behavior: Behavior normal.       Labs Reviewed and Include   Recent Labs   Lab 12/19/22  1356 12/19/22  2137 12/20/22  0438   * 417* 180*   CALCIUM 7.6* 7.6* 7.9*   ALBUMIN 2.7* 2.5*  --    PROT 6.6  --   --    * 132* 133*   K 5.9* 3.2* 3.5   CO2 21* 25 23   CL 94* 95 97   BUN 41* 30* 33*   CREATININE 5.5* 4.3* 4.9*   ALKPHOS 200*  --   --    ALT 35  --   --    AST 91*  --   --    BILITOT 0.7  --   --      Lab Results   Component Value Date    WBC 3.66 (L) 12/20/2022    HGB 10.3 (L) 12/20/2022    HCT 34.1 (L) 12/20/2022    MCV 92 12/20/2022     12/20/2022     No results for input(s): TSH, FREET4 in the last 168 hours.  Lab  Results   Component Value Date    HGBA1C 7.5 (H) 12/20/2022       Nutritional status:   Body mass index is 25.39 kg/m².  Lab Results   Component Value Date    ALBUMIN 2.5 (L) 12/19/2022    ALBUMIN 2.7 (L) 12/19/2022    ALBUMIN 2.5 (L) 11/27/2022     No results found for: PREALBUMIN    Estimated Creatinine Clearance: 16.2 mL/min (A) (based on SCr of 4.9 mg/dL (H)).    Accu-Checks  Recent Labs     12/19/22  1216 12/19/22  1559 12/19/22  1832 12/19/22  2030 12/19/22  2142 12/19/22  2258 12/20/22  0136 12/20/22  0516 12/20/22  0815   POCTGLUCOSE 476* >500* 274* 476* 411* 387* 269* 161* 88        ASSESSMENT and PLAN    * Acute on chronic respiratory failure with hypoxia  - Presenting complication, now on nasal canula  - If a component of COPD is thought to be involved then we would need to know if steroids will be needed as insulin needs would likely change    DKA (diabetic ketoacidosis)  - Present on admission and has since resolved  - Labs on initial ER workup were concerning for mild DKA with elevated ketones, blood glucose > 500, anion gap of 14 and mildly decreased bicarb.  - Did not require intensive IV insulin infusion and instead placed back on outpatient MDI regimen which helped to resolve his DKA  - Etiology unknown, possibly some missed insulin doses outpatient       Latest Reference Range & Units 12/19/22 13:56 12/19/22 16:24 12/20/22 04:38   CO2 23 - 29 mmol/L 21 (L)  23   ANION GAP 8 - 16 mmol/L 14  13   eGFR >60 mL/min/1.73 m^2 11.2 !  12.9 !   Hemoglobin A1C External 4.0 - 5.6 %   7.5 (H)   Estimated Avg Glucose 68 - 131 mg/dL   169 (H)   Beta-Hydroxybutyrate 0.0 - 0.5 mmol/L  1.9 (H)    (L): Data is abnormally low  !: Data is abnormal  (H): Data is abnormally high      Diabetes mellitus with chronic kidney disease on chronic dialysis  - Unclear if type 1 or type 2, but did present with labs consistent with mild DKA  - Based on reports of low blood sugars at NH and downtrend of glucose this morning we  will adjust insulin regimen as below  - A1c reviewed which is 7.5 %, near goal of 7%    Plan:  - Decrease Levemir to 10 units BID  - Decrease Novolog to 5 units TIDAC  - Continue Low dose sliding scale insulin regimen      ESRD on dialysis  - Known ESRD on HD MWF  - Recent missed dialysis appointment  - Carefull of insulin stacking in ESRD, novolog doses should remain spaced every 4 hours             Cole Robles, DO  Endocrinology  [facility] - [facility]

## 2022-12-20 NOTE — ED NOTES
Pt lying in bed respirations even, unlabored, NAD noted, remains on 3L NC. Pt updated on plan of care, will continue to monitor

## 2022-12-20 NOTE — PROGRESS NOTES
[facility] ([facility] Team notified of pt referral via [facility] (automated inbasket notification) .    Patient screened today by provider and RN for enrollment to program.      Pt was deemed not a candidate for enrollment at this time related to patient is currently on hemo-dialysis. Pt seen MWF.    Pt will require additional follow up planning per primary team.     If pt status, diagnosis, or treatment plan changes , please place AMB referral to [facility] ([id number]) for [facility] enrollment re-evalution.

## 2022-12-20 NOTE — PLAN OF CARE
Pt aox4 denies pain; O2 sat 90% at 2L NC; pt O2 increased to 4L   Pt upper lobes anteriorly clear and equal; BLL coarse crackles.   Pt continues to state he is hungry and states he did not eat anything coming from NH. Pt ; pt given sandwich and insulin coverage.   Pt appears unkempt; dry flaky skin. No wounds skin tears.   UMA AVF ++ thrill & bruit. No warmth or pain present.   Call light in reach, all needs are met in no apparent distress.     Problem: Adult Inpatient Plan of Care  Goal: Plan of Care Review  Outcome: Ongoing, Not Progressing  Goal: Patient-Specific Goal (Individualized)  Outcome: Ongoing, Not Progressing  Goal: Absence of Hospital-Acquired Illness or Injury  Outcome: Ongoing, Not Progressing  Goal: Optimal Comfort and Wellbeing  Outcome: Ongoing, Not Progressing  Goal: Readiness for Transition of Care  Outcome: Ongoing, Not Progressing     Problem: Infection  Goal: Absence of Infection Signs and Symptoms  Outcome: Ongoing, Not Progressing     Problem: Device-Related Complication Risk (Hemodialysis)  Goal: Safe, Effective Therapy Delivery  Outcome: Ongoing, Not Progressing     Problem: Hemodynamic Instability (Hemodialysis)  Goal: Effective Tissue Perfusion  Outcome: Ongoing, Not Progressing     Problem: Infection (Hemodialysis)  Goal: Absence of Infection Signs and Symptoms  Outcome: Ongoing, Not Progressing     Problem: Diabetes Comorbidity  Goal: Blood Glucose Level Within Targeted Range  Outcome: Ongoing, Not Progressing     Problem: Skin Injury Risk Increased  Goal: Skin Health and Integrity  Outcome: Ongoing, Not Progressing     Problem: Fall Injury Risk  Goal: Absence of Fall and Fall-Related Injury  Outcome: Ongoing, Not Progressing

## 2022-12-20 NOTE — SUBJECTIVE & OBJECTIVE
Interval History: Patient seen and examined this AM while on iHD. Tolerating well without any physical complaints this morning although appears to have experiencing dyspnea overnight. Afebrile with pulse ranging from 70-60s bpm. Systolic blood pressures ranging from 180-130s mmHg. He is saturating +91% on 2 liters via nasal cannula.     Review of patient's allergies indicates:  No Known Allergies  Current Facility-Administered Medications   Medication Frequency    acetaminophen tablet 650 mg Q6H PRN    albuterol-ipratropium 2.5 mg-0.5 mg/3 mL nebulizer solution 3 mL Q4H PRN    aluminum-magnesium hydroxide-simethicone 200-200-20 mg/5 mL suspension 30 mL QID PRN    apixaban tablet 5 mg BID    aspirin EC tablet 81 mg Daily    atorvastatin tablet 40 mg Daily    carvediloL tablet 6.25 mg BID    cetirizine tablet 10 mg Daily PRN    [START ON 12/24/2022] cloNIDine 0.3 mg/24 hr td ptwk 1 patch Every Sat    dextrose 10% bolus 125 mL 125 mL PRN    dextrose 10% bolus 250 mL 250 mL PRN    FLUoxetine capsule 40 mg Daily    fluticasone furoate-vilanteroL 100-25 mcg/dose diskus inhaler 1 puff Daily    glucagon (human recombinant) injection 1 mg PRN    glucose chewable tablet 16 g PRN    glucose chewable tablet 24 g PRN    hydrALAZINE injection 10 mg Q4H PRN    hydrALAZINE tablet 100 mg Q8H    insulin aspart U-100 pen 0-5 Units QID (AC + HS) PRN    insulin aspart U-100 pen 10 Units TID WM    insulin detemir U-100 pen 15 Units BID    isosorbide mononitrate 24 hr tablet 30 mg BID    melatonin tablet 9 mg Nightly PRN    naloxone 0.4 mg/mL injection 0.02 mg PRN    NIFEdipine 24 hr tablet 90 mg Daily    polyethylene glycol packet 17 g BID PRN    sodium chloride 0.9% flush 10 mL Q8H PRN    sodium chloride 0.9% flush 10 mL PRN    tiotropium bromide 2.5 mcg/actuation inhaler 2 puff Daily    vitamin renal formula (B-complex-vitamin c-folic acid) 1 mg per capsule 1 capsule Daily       Objective:     Vital Signs (Most Recent):  Temp: 98.1 °F  (36.7 °C) (22 0513)  Pulse: 67 (22 0721)  Resp: 20 (22 06)  BP: (!) 187/94 (22 0607)  SpO2: 96 % (22 0513)   Vital Signs (24h Range):  Temp:  [97.5 °F (36.4 °C)-98.1 °F (36.7 °C)] 98.1 °F (36.7 °C)  Pulse:  [66-80] 67  Resp:  [14-20] 20  SpO2:  [93 %-99 %] 96 %  BP: (170-224)/() 187/94     Weight: 78 kg (171 lb 15.3 oz) (22 05)  Body mass index is 25.39 kg/m².  Body surface area is 1.95 meters squared.    I/O last 3 completed shifts:  In: 792.7 [I.V.:192.7; Other:600]  Out: 2600 [Other:2600]    Physical Exam  Vitals and nursing note reviewed.   Constitutional:       General: He is awake. He is not in acute distress.     Appearance: He is ill-appearing. He is not diaphoretic.      Interventions: Nasal cannula in place.   HENT:      Head: Normocephalic and atraumatic.      Right Ear: External ear normal.      Left Ear: External ear normal.      Nose: Nose normal.      Mouth/Throat:      Mouth: Mucous membranes are moist.      Pharynx: Oropharynx is clear. No oropharyngeal exudate or posterior oropharyngeal erythema.   Eyes:      General:         Right eye: No discharge.         Left eye: No discharge.      Extraocular Movements: Extraocular movements intact.      Conjunctiva/sclera: Conjunctivae normal.   Cardiovascular:      Rate and Rhythm: Normal rate.      Heart sounds: No murmur heard.    No friction rub. No gallop.      Arteriovenous access: Left arteriovenous access is present.     Comments: Left upper extremity with palpable thrill and audible bruit.   Pulmonary:      Effort: Pulmonary effort is normal. No respiratory distress.      Breath sounds: Rales present. No wheezing or rhonchi.      Comments: Crackles appreciated.  Abdominal:      General: Bowel sounds are normal. There is no distension.      Palpations: Abdomen is soft.      Tenderness: There is no abdominal tenderness.   Musculoskeletal:      Cervical back: Neck supple.      Right lower le+ Pitting  Edema present.      Left lower le+ Pitting Edema present.   Skin:     General: Skin is warm and dry.      Coloration: Skin is not jaundiced.   Neurological:      General: No focal deficit present.      Mental Status: He is alert. Mental status is at baseline.      Cranial Nerves: No cranial nerve deficit.      Motor: No weakness.   Psychiatric:         Mood and Affect: Mood normal.         Behavior: Behavior normal. Behavior is cooperative.       Significant Labs:  BMP:   Recent Labs   Lab 228   *   *   K 3.5   CL 97   CO2 23   BUN 33*   CREATININE 4.9*   CALCIUM 7.9*   MG 2.2     CBC:   Recent Labs   Lab 22   WBC 3.66*   RBC 3.70*   HGB 10.3*   HCT 34.1*      MCV 92   MCH 27.8   MCHC 30.2*     CMP:   Recent Labs   Lab 226 22   * 417* 180*   CALCIUM 7.6* 7.6* 7.9*   ALBUMIN 2.7* 2.5*  --    PROT 6.6  --   --    * 132* 133*   K 5.9* 3.2* 3.5   CO2 21* 25 23   CL 94* 95 97   BUN 41* 30* 33*   CREATININE 5.5* 4.3* 4.9*   ALKPHOS 200*  --   --    ALT 35  --   --    AST 91*  --   --    BILITOT 0.7  --   --      Coagulation:   Recent Labs   Lab 22   INR 1.4*   APTT 49.7*     LFTs:   Recent Labs   Lab 22  1356 22   ALT 35  --    AST 91*  --    ALKPHOS 200*  --    BILITOT 0.7  --    PROT 6.6  --    ALBUMIN 2.7* 2.5*     Significant Imaging:  I have reviewed all imagining in the last 24 hours.

## 2022-12-20 NOTE — SUBJECTIVE & OBJECTIVE
Past Medical History:   Diagnosis Date    Chronic kidney disease-mineral and bone disorder 10/12/2022    COPD (chronic obstructive pulmonary disease)     Diabetes mellitus type 1     ESRD on dialysis     Hypertension     SOB (shortness of breath) 10/12/2022    Patient with history COPD treated with Ellipta the albuterol, fluticasone-salmeterol tachypneic in the ED saturating 94% and 6 L on nasal cannula, patient does not know how many  he uses it is in nursing home.    -duo nebs Q 4  Given that patient is a poor historian, and in the setting of left lower extremity edema and history of coagulopathy PE cannot be ruled out.  Will workup for possible infec       Past Surgical History:   Procedure Laterality Date    AV FISTULA PLACEMENT         Review of patient's allergies indicates:  No Known Allergies    No current facility-administered medications on file prior to encounter.     Current Outpatient Medications on File Prior to Encounter   Medication Sig    acetaminophen (TYLENOL) 650 MG TbSR Take 650 mg by mouth every 8 (eight) hours as needed (pain or fever over 100.4).    albuterol (PROVENTIL/VENTOLIN HFA) 90 mcg/actuation inhaler Inhale 2 puffs into the lungs every 6 (six) hours as needed for Wheezing or Shortness of Breath.    apixaban (ELIQUIS) 5 mg Tab Take 1 tablet (5 mg total) by mouth 2 (two) times daily. Start this dose AFTER you have completed the 7 days of the 10 mg dose.    aspirin (ECOTRIN) 81 MG EC tablet Take 81 mg by mouth once daily.    atorvastatin (LIPITOR) 40 MG tablet Take 1 tablet (40 mg total) by mouth once daily.    carvediloL (COREG) 3.125 MG tablet Take 2 tablets (6.25 mg total) by mouth 2 (two) times daily.    cetirizine (ZYRTEC) 10 MG tablet Take 10 mg by mouth daily as needed (itching).    cloNIDine 0.3 mg/24 hr td ptwk (CATAPRES) 0.3 mg/24 hr Place 1 patch onto the skin every Saturday.    FLUoxetine 40 MG capsule Take 40 mg by mouth once daily.    fluticasone-salmeterol diskus inhaler  "250-50 mcg Inhale 1 puff into the lungs 2 (two) times daily.    GLUCAGON EMERGENCY KIT, HUMAN, 1 mg injection 1 mg into the muscle as needed if CBG < 70    hydrALAZINE (APRESOLINE) 100 MG tablet Take 1 tablet (100 mg total) by mouth every 8 (eight) hours.    HYDROPHILIC CREAM TOP Apply topically. Apply liberal amount to affected area twice daily for dry skin    insulin aspart (NOVOLOG FLEXPEN U-100 INSULIN SUBQ) Inject into the skin before meals and at bedtime per sliding scale: 0-60= OJ or glucose tab; = 0; 201-250= 2; 251-300= 4; 301-350= 6; 351-400= 8; greater than 400= 10 units then call MD    insulin aspart U-100 (NOVOLOG) 100 unit/mL (3 mL) InPn pen Inject 10 Units into the skin 3 (three) times daily with meals. Max 30 units per day    insulin detemir U-100 (LEVEMIR FLEXTOUCH) 100 unit/mL (3 mL) SubQ InPn pen Inject 15 Units into the skin 2 (two) times daily. Once in the morning and once before bedtime. Max 30 units per day    isosorbide mononitrate (IMDUR) 30 MG 24 hr tablet Take 30 mg by mouth 2 (two) times daily.    melatonin 3 mg TbDL Take 9 mg by mouth nightly as needed (sleep).    NIFEdipine (PROCARDIA-XL) 90 MG (OSM) 24 hr tablet Take 1 tablet (90 mg total) by mouth once daily.    pen needle, diabetic 32 gauge x 5/32" Ndle Use to inject 1 each into the skin 5 (five) times daily.    sodium chloride (OCEAN) 0.65 % nasal spray 2 sprays by Nasal route every 4 (four) hours as needed for Congestion.    tiotropium bromide (SPIRIVA RESPIMAT) 2.5 mcg/actuation inhaler Inhale 2 puffs into the lungs Daily.    triamcinolone acetonide 0.025 % Lotn Apply topically. Apply to the affected area twice daily    vitamin renal formula, B-complex-vitamin c-folic acid, (NEPHROCAP) 1 mg Cap Take 1 capsule by mouth once daily.     Family History       Problem Relation (Age of Onset)    Diabetes Mother          Tobacco Use    Smoking status: Never    Smokeless tobacco: Never   Substance and Sexual Activity    Alcohol use: " Not Currently    Drug use: Not on file    Sexual activity: Not Currently     Review of Systems   Constitutional:  Positive for activity change and chills. Negative for appetite change and fever.   HENT:  Negative for congestion.    Respiratory:  Negative for cough, chest tightness, shortness of breath and wheezing.    Cardiovascular:  Negative for chest pain and palpitations.   Gastrointestinal:  Negative for abdominal distention, abdominal pain, constipation, diarrhea, nausea and vomiting.   Musculoskeletal:  Positive for arthralgias and gait problem.   Skin:  Positive for rash.   Neurological:  Positive for weakness. Negative for dizziness, speech difficulty and headaches.   Psychiatric/Behavioral:  Negative for agitation and confusion.    All other systems reviewed and are negative.  Objective:     Vital Signs (Most Recent):  Temp: 97.7 °F (36.5 °C) (12/19/22 1645)  Pulse: 71 (12/19/22 1815)  Resp: 19 (12/19/22 1636)  BP: (!) 201/107 (12/19/22 1815)  SpO2: (!) 93 % (12/19/22 1645)   Vital Signs (24h Range):  Temp:  [97.5 °F (36.4 °C)-97.7 °F (36.5 °C)] 97.7 °F (36.5 °C)  Pulse:  [69-80] 71  Resp:  [14-20] 19  SpO2:  [93 %-99 %] 93 %  BP: (170-224)/() 201/107     Weight: 84.8 kg (186 lb 15.2 oz)  Body mass index is 27.61 kg/m².    Physical Exam  Vitals and nursing note reviewed.   Constitutional:       General: He is not in acute distress.     Appearance: He is not toxic-appearing.   HENT:      Head: Normocephalic and atraumatic.      Mouth/Throat:      Mouth: Mucous membranes are moist.      Pharynx: No oropharyngeal exudate.   Eyes:      Pupils: Pupils are equal, round, and reactive to light.   Cardiovascular:      Rate and Rhythm: Normal rate. Rhythm irregular.      Heart sounds: Murmur heard.     No friction rub. No gallop.   Pulmonary:      Effort: Pulmonary effort is normal. No respiratory distress.      Breath sounds: No wheezing or rhonchi.   Chest:      Chest wall: No tenderness.   Abdominal:       General: Bowel sounds are normal. There is no distension.      Tenderness: There is abdominal tenderness. There is rebound. There is no guarding.   Musculoskeletal:         General: No swelling.      Cervical back: No rigidity or tenderness.      Right lower leg: Edema present.      Left lower leg: Edema present.   Skin:     Findings: Erythema, lesion and rash present.   Neurological:      General: No focal deficit present.      Mental Status: He is alert and oriented to person, place, and time.      Motor: Weakness present.      Gait: Gait abnormal.   Psychiatric:         Thought Content: Thought content normal.         CRANIAL NERVES     CN III, IV, VI   Pupils are equal, round, and reactive to light.     Significant Labs: All pertinent labs within the past 24 hours have been reviewed.  Recent Lab Results  (Last 5 results in the past 24 hours)        12/20/22  0815   12/20/22  0516   12/20/22  0438   12/20/22  0136   12/19/22  2259        Albumin               ANION GAP     13           aPTT               Baso #     0.03           Basophil %     0.8           BUN     33           Calcium     7.9           Chloride     97           CO2     23           Creatinine     4.9           Differential Method     Automated           eGFR     12.9           Eos #     0.1           Eosinophil %     1.4           Estimated Avg Glucose     169           Glucose     180           Gran # (ANC)     2.7           Gran %     73.2           HEMATOCRIT     34.1           HEMOGLOBIN     10.3           Hemoglobin A1C External     7.5  Comment: ADA Screening Guidelines:  5.7-6.4%  Consistent with prediabetes  >or=6.5%  Consistent with diabetes    High levels of fetal hemoglobin interfere with the HbA1C  assay. Heterozygous hemoglobin variants (HbS, HgC, etc)do  not significantly interfere with this assay.   However, presence of multiple variants may affect accuracy.             Immature Grans (Abs)     0.02  Comment: Mild elevation in  immature granulocytes is non specific and   can be seen in a variety of conditions including stress response,   acute inflammation, trauma and pregnancy. Correlation with other   laboratory and clinical findings is essential.             Immature Granulocytes     0.5           INR               Lymph #     0.5           Lymph %     13.7           Magnesium     2.2           MCH     27.8           MCHC     30.2           MCV     92           Mono #     0.4           Mono %     10.4           MPV     11.9           nRBC     0           Phosphorus               Platelets     185           POC Glucose         387       POCT Glucose 88   161     269         Potassium     3.5           Protime               RBC     3.70           RDW     17.1           Sodium     133           WBC     3.66                                  Significant Imaging: I have reviewed all pertinent imaging results/findings within the past 24 hours.

## 2022-12-20 NOTE — PROGRESS NOTES
Patient arrived in a stretcher to dialysis unit.   Report received as documented in PER Handoff on Doc Flowsheet.  VS's per Doc Flowsheet.    Maintenance Hemodialysis initiated using the following:    Dialysis Access: UMA AVF    Needle size: 15 gauge X2  Insertion with no complications.    Will Maintain telemetry and blood pressure monitoring throughout treatment.  Refer to flowsheet and MAR for details.

## 2022-12-20 NOTE — NURSING
Iv 10mg hydralazine given with PO 100mg hydralazine, 30 min recheck pt bp 184/97 HR 66  Will continue to monitor

## 2022-12-20 NOTE — ASSESSMENT & PLAN NOTE
- Presenting complication, now on nasal canula  - If a component of COPD is thought to be involved then we would need to know if steroids will be needed as insulin needs would likely change

## 2022-12-20 NOTE — ASSESSMENT & PLAN NOTE
- Present on admission and has since resolved  - Labs on initial ER workup were concerning for mild DKA with elevated ketones, blood glucose > 500, anion gap of 14 and mildly decreased bicarb.  - Did not require intensive IV insulin infusion and instead placed back on outpatient MDI regimen which helped to resolve his DKA  - Etiology unknown, possibly some missed insulin doses outpatient       Latest Reference Range & Units 12/19/22 13:56 12/19/22 16:24 12/20/22 04:38   CO2 23 - 29 mmol/L 21 (L)  23   ANION GAP 8 - 16 mmol/L 14  13   eGFR >60 mL/min/1.73 m^2 11.2 !  12.9 !   Hemoglobin A1C External 4.0 - 5.6 %   7.5 (H)   Estimated Avg Glucose 68 - 131 mg/dL   169 (H)   Beta-Hydroxybutyrate 0.0 - 0.5 mmol/L  1.9 (H)    (L): Data is abnormally low  !: Data is abnormal  (H): Data is abnormally high

## 2022-12-20 NOTE — PROGRESS NOTES
"[facility] - Cardiology [facility] Medicine  Progress Note    Patient Name: Cosme Lewis  MRN: [id number]  Patient Class: OP- Observation   Admission Date: 12/19/2022  Length of Stay: 0 days  Attending Physician: Len Easton MD  Primary Care Provider: Primary Doctor No        Subjective:     Principal Problem:Acute on chronic respiratory failure with hypoxia        HPI:  Cosme Lewis is a 58 yo male with ESRD on HD MWF, hx of VTE on eliquis, HFpEF, COPD, diabetes, and hypertension, who presents with intermittent nosebleeding for the last 3 days. Bleeding currently controlled, he has been taking his eliquis. He also c/o SOB that he noticed today with increased LE swelling. He reports he missed his dialysis session today, last session Friday. He denies fevers or chills, cough, chest pain, palpitations, confusion, lethargy, headache, urinary symptoms, NVD.     In the ED: P 70, /92max. RR16-20. Satting 96% on 4L NC. Afebrile. WBC 3.7K. H/H 10.4/34. . Na 129 (Corrected sodium 135). Glucose 499. K 5.9 (with visible hemolysis). BUN 41/ Cr 5.5. BNP 1701.   CXR: "Reconfirmed enlargement of cardiopericardial silhouette and central pulmonary vascular congestion.  Reconfirmed scattered the extensive bilateral interstitial and some subtle ground-glass opacities that could be on the basis of edema and or infection.  Clinical correlation.  Some increasing density about the right mid lung zone-minor fissure that could relate to progressive atelectasis, layered fluid, or infiltrate.  No obvious pleural fluid blunting right or left costophrenic sulci and no right or left pneumothorax." Given 8U insulin.     Overview/Hospital Course:  No notes on file    Past Medical History:   Diagnosis Date    Chronic kidney disease-mineral and bone disorder 10/12/2022    COPD (chronic obstructive pulmonary disease)     Diabetes mellitus type 1     ESRD on dialysis     Hypertension     SOB (shortness of breath) 10/12/2022    Patient " with history COPD treated with Ellipta the albuterol, fluticasone-salmeterol tachypneic in the ED saturating 94% and 6 L on nasal cannula, patient does not know how many  he uses it is in nursing home.    -duo nebs Q 4  Given that patient is a poor historian, and in the setting of left lower extremity edema and history of coagulopathy PE cannot be ruled out.  Will workup for possible infec       Past Surgical History:   Procedure Laterality Date    AV FISTULA PLACEMENT         Review of patient's allergies indicates:  No Known Allergies    No current facility-administered medications on file prior to encounter.     Current Outpatient Medications on File Prior to Encounter   Medication Sig    acetaminophen (TYLENOL) 650 MG TbSR Take 650 mg by mouth every 8 (eight) hours as needed (pain or fever over 100.4).    albuterol (PROVENTIL/VENTOLIN HFA) 90 mcg/actuation inhaler Inhale 2 puffs into the lungs every 6 (six) hours as needed for Wheezing or Shortness of Breath.    apixaban (ELIQUIS) 5 mg Tab Take 1 tablet (5 mg total) by mouth 2 (two) times daily. Start this dose AFTER you have completed the 7 days of the 10 mg dose.    aspirin (ECOTRIN) 81 MG EC tablet Take 81 mg by mouth once daily.    atorvastatin (LIPITOR) 40 MG tablet Take 1 tablet (40 mg total) by mouth once daily.    carvediloL (COREG) 3.125 MG tablet Take 2 tablets (6.25 mg total) by mouth 2 (two) times daily.    cetirizine (ZYRTEC) 10 MG tablet Take 10 mg by mouth daily as needed (itching).    cloNIDine 0.3 mg/24 hr td ptwk (CATAPRES) 0.3 mg/24 hr Place 1 patch onto the skin every Saturday.    FLUoxetine 40 MG capsule Take 40 mg by mouth once daily.    fluticasone-salmeterol diskus inhaler 250-50 mcg Inhale 1 puff into the lungs 2 (two) times daily.    GLUCAGON EMERGENCY KIT, HUMAN, 1 mg injection 1 mg into the muscle as needed if CBG < 70    hydrALAZINE (APRESOLINE) 100 MG tablet Take 1 tablet (100 mg total) by mouth every 8 (eight) hours.    HYDROPHILIC  "CREAM TOP Apply topically. Apply liberal amount to affected area twice daily for dry skin    insulin aspart (NOVOLOG FLEXPEN U-100 INSULIN SUBQ) Inject into the skin before meals and at bedtime per sliding scale: 0-60= OJ or glucose tab; = 0; 201-250= 2; 251-300= 4; 301-350= 6; 351-400= 8; greater than 400= 10 units then call MD    insulin aspart U-100 (NOVOLOG) 100 unit/mL (3 mL) InPn pen Inject 10 Units into the skin 3 (three) times daily with meals. Max 30 units per day    insulin detemir U-100 (LEVEMIR FLEXTOUCH) 100 unit/mL (3 mL) SubQ InPn pen Inject 15 Units into the skin 2 (two) times daily. Once in the morning and once before bedtime. Max 30 units per day    isosorbide mononitrate (IMDUR) 30 MG 24 hr tablet Take 30 mg by mouth 2 (two) times daily.    melatonin 3 mg TbDL Take 9 mg by mouth nightly as needed (sleep).    NIFEdipine (PROCARDIA-XL) 90 MG (OSM) 24 hr tablet Take 1 tablet (90 mg total) by mouth once daily.    pen needle, diabetic 32 gauge x 5/32" Ndle Use to inject 1 each into the skin 5 (five) times daily.    sodium chloride (OCEAN) 0.65 % nasal spray 2 sprays by Nasal route every 4 (four) hours as needed for Congestion.    tiotropium bromide (SPIRIVA RESPIMAT) 2.5 mcg/actuation inhaler Inhale 2 puffs into the lungs Daily.    triamcinolone acetonide 0.025 % Lotn Apply topically. Apply to the affected area twice daily    vitamin renal formula, B-complex-vitamin c-folic acid, (NEPHROCAP) 1 mg Cap Take 1 capsule by mouth once daily.     Family History       Problem Relation (Age of Onset)    Diabetes Mother          Tobacco Use    Smoking status: Never    Smokeless tobacco: Never   Substance and Sexual Activity    Alcohol use: Not Currently    Drug use: Not on file    Sexual activity: Not Currently     Review of Systems   Constitutional:  Positive for activity change and chills. Negative for appetite change and fever.   HENT:  Negative for congestion.    Respiratory:  Negative for cough, chest " tightness, shortness of breath and wheezing.    Cardiovascular:  Negative for chest pain and palpitations.   Gastrointestinal:  Negative for abdominal distention, abdominal pain, constipation, diarrhea, nausea and vomiting.   Musculoskeletal:  Positive for arthralgias and gait problem.   Skin:  Positive for rash.   Neurological:  Positive for weakness. Negative for dizziness, speech difficulty and headaches.   Psychiatric/Behavioral:  Negative for agitation and confusion.    All other systems reviewed and are negative.  Objective:     Vital Signs (Most Recent):  Temp: 97.7 °F (36.5 °C) (12/19/22 1645)  Pulse: 71 (12/19/22 1815)  Resp: 19 (12/19/22 1636)  BP: (!) 201/107 (12/19/22 1815)  SpO2: (!) 93 % (12/19/22 1645)   Vital Signs (24h Range):  Temp:  [97.5 °F (36.4 °C)-97.7 °F (36.5 °C)] 97.7 °F (36.5 °C)  Pulse:  [69-80] 71  Resp:  [14-20] 19  SpO2:  [93 %-99 %] 93 %  BP: (170-224)/() 201/107     Weight: 84.8 kg (186 lb 15.2 oz)  Body mass index is 27.61 kg/m².    Physical Exam  Vitals and nursing note reviewed.   Constitutional:       General: He is not in acute distress.     Appearance: He is not toxic-appearing.   HENT:      Head: Normocephalic and atraumatic.      Mouth/Throat:      Mouth: Mucous membranes are moist.      Pharynx: No oropharyngeal exudate.   Eyes:      Pupils: Pupils are equal, round, and reactive to light.   Cardiovascular:      Rate and Rhythm: Normal rate. Rhythm irregular.      Heart sounds: Murmur heard.     No friction rub. No gallop.   Pulmonary:      Effort: Pulmonary effort is normal. No respiratory distress.      Breath sounds: No wheezing or rhonchi.   Chest:      Chest wall: No tenderness.   Abdominal:      General: Bowel sounds are normal. There is no distension.      Tenderness: There is abdominal tenderness. There is rebound. There is no guarding.   Musculoskeletal:         General: No swelling.      Cervical back: No rigidity or tenderness.      Right lower leg: Edema  present.      Left lower leg: Edema present.   Skin:     Findings: Erythema, lesion and rash present.   Neurological:      General: No focal deficit present.      Mental Status: He is alert and oriented to person, place, and time.      Motor: Weakness present.      Gait: Gait abnormal.   Psychiatric:         Thought Content: Thought content normal.         CRANIAL NERVES     CN III, IV, VI   Pupils are equal, round, and reactive to light.     Significant Labs: All pertinent labs within the past 24 hours have been reviewed.  Recent Lab Results  (Last 5 results in the past 24 hours)        12/20/22  0815   12/20/22  0516   12/20/22  0438   12/20/22  0136   12/19/22  2259        Albumin               ANION GAP     13           aPTT               Baso #     0.03           Basophil %     0.8           BUN     33           Calcium     7.9           Chloride     97           CO2     23           Creatinine     4.9           Differential Method     Automated           eGFR     12.9           Eos #     0.1           Eosinophil %     1.4           Estimated Avg Glucose     169           Glucose     180           Gran # (ANC)     2.7           Gran %     73.2           HEMATOCRIT     34.1           HEMOGLOBIN     10.3           Hemoglobin A1C External     7.5  Comment: ADA Screening Guidelines:  5.7-6.4%  Consistent with prediabetes  >or=6.5%  Consistent with diabetes    High levels of fetal hemoglobin interfere with the HbA1C  assay. Heterozygous hemoglobin variants (HbS, HgC, etc)do  not significantly interfere with this assay.   However, presence of multiple variants may affect accuracy.             Immature Grans (Abs)     0.02  Comment: Mild elevation in immature granulocytes is non specific and   can be seen in a variety of conditions including stress response,   acute inflammation, trauma and pregnancy. Correlation with other   laboratory and clinical findings is essential.             Immature Granulocytes     0.5            INR               Lymph #     0.5           Lymph %     13.7           Magnesium     2.2           MCH     27.8           MCHC     30.2           MCV     92           Mono #     0.4           Mono %     10.4           MPV     11.9           nRBC     0           Phosphorus               Platelets     185           POC Glucose         387       POCT Glucose 88   161     269         Potassium     3.5           Protime               RBC     3.70           RDW     17.1           Sodium     133           WBC     3.66                                  Significant Imaging: I have reviewed all pertinent imaging results/findings within the past 24 hours.    Assessment/Plan:      * Acute on chronic respiratory failure with hypoxia  Patient with Hypoxic Respiratory failure which is Acute on chronic.  he is on home oxygen at 2 LPM. Supplemental oxygen was provided and noted- 4-5L NC  .   Signs/symptoms of respiratory failure include- tachypnea, increased work of breathing and wheezing. Contributing diagnoses includes - CHF and Hypervolemia Labs and images were reviewed. Patient Has not had a recent ABG. Will treat underlying causes and adjust management of respiratory failure as follows-  - Volume management with HD  - see CHF    HLD (hyperlipidemia)  - continue statin      Renovascular hypertension  - continue clonidine, nifedipine, hydralazine, coreg, imdur      Anemia in ESRD (end-stage renal disease)  - stable, at baseline  - nose bleeding controlled   - continue eliquis for now, monitor daily CBC      Acute on chronic diastolic heart failure  Pulmonary hypertension    CHF Pathway initiated  Recent 2D echo with grade 2 diastolic heart failure, preserved EF, and moderate-severe pulmonary hypertension (see below)  BNP >1700 and CXR with pulmonary edema  Defer to nephrology for volume control with HD  Strict I&Os with daily weights. Admit weight 186 lb.   Continue asa, statin, coreg, imdur, BP control    Results for orders  placed during the hospital encounter of 11/18/22    Echo    Interpretation Summary  · The left ventricle is normal in size with mild concentric hypertrophy and increased density and normal systolic function.  · The estimated ejection fraction is 63%.  · Grade II left ventricular diastolic dysfunction.  · Severe left atrial enlargement.  · Mild right ventricular enlargement with low normal right ventricular systolic function.  · There is right ventricular hypertrophy.  · Moderate right atrial enlargement.  · Mild to moderate tricuspid regurgitation.  · Mild pulmonic regurgitation.  · Elevated central venous pressure (15 mmHg).  · The estimated PA systolic pressure is 67 mmHg.  · There is moderate-severe pulmonary hypertension.  · Trivial posterolateral pericardial effusion.  · There is a moderate left pleural effusion.  · There may be some ascites present.      Other emphysema  - CXR reviewed  - does not appear to be in COPD exacerbation at this time; presentation more c/w CHF/ volume overload  - continue LAMA and ICS-LABA inhalers      Diabetes mellitus with chronic kidney disease on chronic dialysis  Patient's FSGs are uncontrolled due to hyperglycemia on current medication regimen.  Last A1c reviewed-   Lab Results   Component Value Date    HGBA1C 11.9 (H) 10/12/2022     Most recent fingerstick glucose reviewed-   Recent Labs   Lab 12/19/22  1216   POCTGLUCOSE 476*     Current correctional scale  Low  Maintain anti-hyperglycemic dose as follows-   Antihyperglycemics (From admission, onward)      Start     Stop Route Frequency Ordered    12/19/22 2100  insulin detemir U-100 pen 15 Units         -- SubQ 2 times daily 12/19/22 1717    12/19/22 1730  insulin aspart U-100 pen 10 Units         -- SubQ 3 times daily with meals 12/19/22 1717    12/19/22 1724  insulin aspart U-100 pen 0-5 Units         -- SubQ Before meals & nightly PRN 12/19/22 1627          Hold Oral hypoglycemics while patient is in the hospital.  -  beta-hydroxybutyrate pending  - UA ordered  - endocrinology consulted for A1C >11, appreciate assistance    ESRD on dialysis  CKD mineral and bone disorder    Creatine and BUN elevated from baseline on admit. BMP reviewed- noted Estimated Creatinine Clearance: 14.5 mL/min (A) (based on SCr of 5.5 mg/dL (H)). according to latest data. Monitor UOP and serial BMP and adjust therapy as needed. Renally dose meds.  - trend renal function panel   - continue daily renal vitamin  - Nephrology consulted for HD assistance            VTE Risk Mitigation (From admission, onward)           Ordered     apixaban tablet 5 mg  2 times daily         12/19/22 1627     IP VTE HIGH RISK PATIENT  Once         12/19/22 1627     Place sequential compression device  Until discontinued         12/19/22 1627                    Discharge Planning   GERA: 12/21/2022     Code Status: Full Code   Is the patient medically ready for discharge?:     Reason for patient still in hospital (select all that apply): Patient trending condition and Treatment  Discharge Plan A: Return to nursing home            Time spent 40 minutes       Len Easton MD  Department of Hospital Medicine   [facility] - Cardiology Stepdown

## 2022-12-20 NOTE — PROGRESS NOTES
[facility] - Cardiology Stepdown  Endocrinology  Progress Note    Admit Date: 2022     Mr. Cosme Lewis is a 57 year old male with medical history significant for hypertension, COPD, diabetes mellitus complicated by ESRD on HD MWF.  He presented to the hospital from his nursing home due to concerns for a nose bleed and acute on chronic respiratory failure.  He was found to have blood sugar greater than 500 with elevated beta hydroxybutyrate, low bicarb and anion gap of 14.  He met criteria, although somewhat mild and was placed back on his outpatient MDI insulin regimen.  Endocrine was then consulted for further assistance in glucose management    Mr. Nowak is a poor historian but states that he was diagnosed with diabetes mellitus around 3 years ago although labs suggest he has been a diabetic since at least .  Uncertain if type 1 or type 2 based on chart review.  He is uncertain of his insulin regimen at nursing facility but he knows he is on both prandial and basal insulin.  He reports that he has had some low blood sugars at the NH.  He admits that his ESRD is a complication of his DM.  Family history includes his mother who was diagnosed with DM.        Outpatient Regimen (Per chart review):  Levemir 15 units BID  Novolog 10 units TIDAC  Low dose sliding scale insulin    Lab Results   Component Value Date    HGBA1C 7.5 (H) 2022             Interval HPI:   Overnight events: New admission overnight with labs meeting DKA criteria, treated with subq insulin.  Improved glucose this AM.    Eatin%  Nausea: No  Hypoglycemia and intervention: No  Fever: No  TPN and/or TF: No  If yes, type of TF/TPN and rate: N/A    PMH, PSH, FH, SH updated and reviewed     ROS:  As above    Current Medications and/or Treatments Impacting Glycemic Control  Immunotherapy:    Immunosuppressants       None          Steroids:   Hormones (From admission, onward)      Start     Stop Route Frequency Ordered    22 7255   melatonin tablet 9 mg         -- Oral Nightly PRN 12/19/22 1627          Pressors:    Autonomic Drugs (From admission, onward)      None          Hyperglycemia/Diabetes Medications:   Antihyperglycemics (From admission, onward)      Start     Stop Route Frequency Ordered    12/20/22 2100  insulin detemir U-100 pen 10 Units         -- SubQ 2 times daily 12/20/22 1045    12/20/22 1200  insulin aspart U-100 pen 7 Units         -- SubQ 3 times daily with meals 12/20/22 1045    12/19/22 1724  insulin aspart U-100 pen 0-5 Units         -- SubQ Before meals & nightly PRN 12/19/22 1627             PHYSICAL EXAMINATION:  Vitals:    12/20/22 0817   BP:    Pulse:    Resp:    Temp: (!) 36.5 °F (2.5 °C)     Body mass index is 25.39 kg/m².    Physical Exam  Vitals reviewed.   Constitutional:       Appearance: Normal appearance.   Eyes:      Extraocular Movements: Extraocular movements intact.   Cardiovascular:      Rate and Rhythm: Normal rate.   Pulmonary:      Effort: Pulmonary effort is normal.   Neurological:      General: No focal deficit present.      Mental Status: He is alert.   Psychiatric:         Mood and Affect: Mood normal.         Behavior: Behavior normal.         ASSESSMENT and PLAN    * Acute on chronic respiratory failure with hypoxia  - Presenting complication, now on nasal canula  - If a component of COPD is thought to be involved then we would need to know if steroids will be needed as insulin needs would likely change    DKA (diabetic ketoacidosis)  - Present on admission and has since resolved  - Labs on initial ER workup were concerning for mild DKA with elevated ketones, blood glucose > 500, anion gap of 14 and mildly decreased bicarb.  - Did not require intensive IV insulin infusion and instead placed back on outpatient MDI regimen which helped to resolve his DKA  - Etiology unknown, possibly some missed insulin doses outpatient       Latest Reference Range & Units 12/19/22 13:56 12/19/22 16:24 12/20/22  04:38   CO2 23 - 29 mmol/L 21 (L)  23   ANION GAP 8 - 16 mmol/L 14  13   eGFR >60 mL/min/1.73 m^2 11.2 !  12.9 !   Hemoglobin A1C External 4.0 - 5.6 %   7.5 (H)   Estimated Avg Glucose 68 - 131 mg/dL   169 (H)   Beta-Hydroxybutyrate 0.0 - 0.5 mmol/L  1.9 (H)    (L): Data is abnormally low  !: Data is abnormal  (H): Data is abnormally high      Diabetes mellitus with chronic kidney disease on chronic dialysis  - Unclear if type 1 or type 2, but did present with labs consistent with mild DKA  - Based on reports of low blood sugars at NH and downtrend of glucose this morning we will adjust insulin regimen as below  - A1c reviewed which is 7.5 %, near goal of 7%    Plan:  - Decrease Levemir to 10 units BID  - Decrease Novolog to 7 units TIDAC  - Continue Low dose sliding scale insulin regimen      ESRD on dialysis  - Known ESRD on HD MWF  - Recent missed dialysis appointment  - Carefull of insulin stacking in ESRD, novolog doses should remain spaced every 4 hours        Cole Robles, DO  Endocrinology  [facility] - Cardiology Stepdown

## 2022-12-20 NOTE — ASSESSMENT & PLAN NOTE
- Unclear if type 1 or type 2, but did present with labs consistent with mild DKA  - Based on reports of low blood sugars at NH and downtrend of glucose this morning we will adjust insulin regimen as below  - A1c reviewed which is 7.5 %, near goal of 7%    Plan:  - Decrease Levemir to 10 units BID  - Decrease Novolog to 7 units TIDAC  - Continue Low dose sliding scale insulin regimen

## 2022-12-20 NOTE — SUBJECTIVE & OBJECTIVE
Overnight events: New admission overnight with labs meeting DKA criteria, treated with subq insulin.  Improved glucose this AM.    Eatin%  Nausea: No  Hypoglycemia and intervention: No  Fever: No  TPN and/or TF: No  If yes, type of TF/TPN and rate: N/A     PMH, PSH, FH, SH updated and reviewed      ROS:  As above     Current Medications and/or Treatments Impacting Glycemic Control  Immunotherapy:    Immunosuppressants         None             Steroids:   Hormones (From admission, onward)        Start     Stop Route Frequency Ordered     22 1724   melatonin tablet 9 mg         -- Oral Nightly PRN 22 162             Pressors:    Autonomic Drugs (From admission, onward)        None             Hyperglycemia/Diabetes Medications:   Antihyperglycemics (From admission, onward)        Start     Stop Route Frequency Ordered     22 2100   insulin detemir U-100 pen 10 Units         -- SubQ 2 times daily 22 1045     22 1200   insulin aspart U-100 pen 7 Units         -- SubQ 3 times daily with meals 22 1045     22 1724   insulin aspart U-100 pen 0-5 Units         -- SubQ Before meals & nightly PRN 22 1627                PHYSICAL EXAMINATION:      Vitals:     22 0817   BP:     Pulse:     Resp:     Temp: (!) 36.5 °F (2.5 °C)      Body mass index is 25.39 kg/m².     Physical Exam  Vitals reviewed.   Constitutional:       Appearance: Normal appearance.   Eyes:      Extraocular Movements: Extraocular movements intact.   Cardiovascular:      Rate and Rhythm: Normal rate.   Pulmonary:      Effort: Pulmonary effort is normal.   Neurological:      General: No focal deficit present.      Mental Status: He is alert.   Psychiatric:         Mood and Affect: Mood normal.         Behavior: Behavior normal.

## 2022-12-20 NOTE — SUBJECTIVE & OBJECTIVE
Interval HPI:   Overnight events: New admission overnight with labs meeting DKA criteria, treated with subq insulin.  Improved glucose this AM.    Eatin%  Nausea: No  Hypoglycemia and intervention: No  Fever: No  TPN and/or TF: No  If yes, type of TF/TPN and rate: N/A    PMH, PSH, FH, SH updated and reviewed     ROS:  As above    Current Medications and/or Treatments Impacting Glycemic Control  Immunotherapy:    Immunosuppressants       None          Steroids:   Hormones (From admission, onward)      Start     Stop Route Frequency Ordered    22 1724  melatonin tablet 9 mg         -- Oral Nightly PRN 22 1627          Pressors:    Autonomic Drugs (From admission, onward)      None          Hyperglycemia/Diabetes Medications:   Antihyperglycemics (From admission, onward)      Start     Stop Route Frequency Ordered    22 2100  insulin detemir U-100 pen 10 Units         -- SubQ 2 times daily 22 1045    22 1200  insulin aspart U-100 pen 7 Units         -- SubQ 3 times daily with meals 22 1045    22 1724  insulin aspart U-100 pen 0-5 Units         -- SubQ Before meals & nightly PRN 22 1627             PHYSICAL EXAMINATION:  Vitals:    22 0817   BP:    Pulse:    Resp:    Temp: (!) 36.5 °F (2.5 °C)     Body mass index is 25.39 kg/m².    Physical Exam  Vitals reviewed.   Constitutional:       Appearance: Normal appearance.   Eyes:      Extraocular Movements: Extraocular movements intact.   Cardiovascular:      Rate and Rhythm: Normal rate.   Pulmonary:      Effort: Pulmonary effort is normal.   Neurological:      General: No focal deficit present.      Mental Status: He is alert.   Psychiatric:         Mood and Affect: Mood normal.         Behavior: Behavior normal.

## 2022-12-20 NOTE — ASSESSMENT & PLAN NOTE
- Known ESRD on HD MWF  - Recent missed dialysis appointment  - Carefull of insulin stacking in ESRD, novolog doses should remain spaced every 4 hours

## 2022-12-20 NOTE — PLAN OF CARE
Patient is resident of [facility]. GERA tomorrow. Pt unavailable at time of assessment. SW following for post-acute needs.    Ivory Nur, [facility]  Case Management Department  [email or web address]    Nick Menj[facility] - Cardiology Stepdown  Initial Discharge Assessment       Primary Care Provider: Primary Doctor No    Admission Diagnosis: Shortness of breath [R06.02]  Chest pain [R07.9]  Acute on chronic respiratory failure with hypoxia [J96.21]    Admission Date: 12/19/2022  Expected Discharge Date:     Discharge Barriers Identified: None    Payor: MEDICARE / Plan: MEDICARE PART A & B / Product Type: Government /     Extended Emergency Contact Information  Primary Emergency Contact: Kassandra Leon  Mobile Phone: [phone]  Relation: Mother    Discharge Plan A: Return to nursing home  Discharge Plan B: Return to Nursing Home    No Pharmacies Listed    Initial Assessment (most recent)       Adult Discharge Assessment - 12/20/22 1551          Discharge Assessment    Assessment Type Discharge Planning Assessment     Source of Information health record     If unable to respond/provide information was family/caregiver contacted? Other (see comments)   Patient not in room for Assessment    Communicated GERA with patient/caregiver No     People in Home facility resident     Facility Arrived From: [facility]     Do you expect to return to your current living situation? Yes     Do you have help at home or someone to help you manage your care at home? Yes     Who are your caregiver(s) and their phone number(s)? Staff     Prior to hospitilization cognitive status: Unable to Assess     Current cognitive status: Unable to Assess     Home Accessibility wheelchair accessible     Home Layout Able to live on 1st floor     Readmission within 30 days? No     Patient currently being followed by outpatient case management? Yes     If yes, name of outpatient case management following: other (comments)   Facility Resident     Do you currently have service(s) that help you manage your care at home? Yes     Name and Contact number of agency [facility]     Is the pt/caregiver preference to resume services with current agency Yes     Do you take prescription medications? Yes     Do you have prescription coverage? Yes     Coverage Medicaid     Do you have any problems affording any of your prescribed medications? No     Is the patient taking medications as prescribed? yes     Who is going to help you get home at discharge? Medical Transportation     How do you get to doctors appointments? agency     Discharge Plan A Return to nursing home     Discharge Plan B Return to Nursing Home     DME Needed Upon Discharge  none     Discharge Barriers Identified None

## 2022-12-20 NOTE — NURSING
0530 bp 204/107 HR 68  PA notified, IV hydralazine ordered   Pt asymptomatic  Will continue to monitor

## 2022-12-20 NOTE — PROGRESS NOTES
Hemodialysis treatment completed.    Treatment time received: 2 hours    Net fluid removed: 2 liters    Tolerated Treatment well. VSS. No acute distress.    Needles X2 removed and site de-accessed.  Pressure held till hemostasis obtained.  Placed gauze and tape dressing to site.  Fistual with continued bruit and thrill post treatment.    Report given as documented in PER Handoff on Doc Flowsheet  Refer to flowsheet and MAR for details.  Patient transported back in stretcher from Dialysis to primary unit.

## 2022-12-20 NOTE — HPI
Mr. Cosme Lewis is a 57 year old male with medical history significant for hypertension, COPD, diabetes mellitus complicated by ESRD on HD MWF.  He presented to the hospital from his nursing home due to concerns for a nose bleed and acute on chronic respiratory failure.  He was found to have blood sugar greater than 500 with elevated beta hydroxybutyrate, low bicarb and anion gap of 14.  He met criteria, although somewhat mild and was placed back on his outpatient MDI insulin regimen.  Endocrine was then consulted for further assistance in glucose management    Mr. Nowak is a poor historian but states that he was diagnosed with diabetes mellitus around 3 years ago although labs suggest he has been a diabetic since at least 2009.  Uncertain if type 1 or type 2 based on chart review.  He is uncertain of his insulin regimen at nursing facility but he knows he is on both prandial and basal insulin.  He reports that he has had some low blood sugars at the NH.  He admits that his ESRD is a complication of his DM.  Family history includes his mother who was diagnosed with DM.        Outpatient Regimen (Per chart review):  Levemir 15 units BID  Novolog 10 units TIDAC  Low dose sliding scale insulin    Lab Results   Component Value Date    HGBA1C 7.5 (H) 12/20/2022

## 2022-12-21 PROBLEM — E44.1 MALNUTRITION OF MILD DEGREE: Status: ACTIVE | Noted: 2022-01-01

## 2022-12-21 NOTE — ASSESSMENT & PLAN NOTE
Cosme Lewis is a 57 y.o. male with T1DM, ESRD on HD (MWF), HTN, HFpEF, HLD, chronic hypoxemic resp failure 2/2 COPD (on home O2 - 5L), h/o DVT/PE on Eliquis transported by EMS from [facility] due to uncontrolled nose bleeding. K 5.9 with hemolysis     Outpatient HD Information:     -Outpatient HD unit: [facility]  -HD tx days: MWF   -Last HD tx prior to presentation: 12/16/22  -HD access: LUE AVF  -HD modality: iHD   -Residual urine: Anuric         Plan/Recommendations:  -HD today for metabolic clearance and volume management  -Renal diet, if not NPO   -Strict I/O's and daily weights  -Daily renal function panels   -Renally dose meds

## 2022-12-21 NOTE — ASSESSMENT & PLAN NOTE
Patient's FSGs are uncontrolled due to hyperglycemia on current medication regimen.  Last A1c reviewed-   Lab Results   Component Value Date    HGBA1C 7.5 (H) 12/20/2022     Most recent fingerstick glucose reviewed-   Recent Labs   Lab 12/21/22  0631 12/21/22  0638 12/21/22  0733 12/21/22  1052   POCTGLUCOSE 42* 39* 91 99     Current correctional scale  Low  Maintain anti-hyperglycemic dose as follows-   Antihyperglycemics (From admission, onward)    Start     Stop Route Frequency Ordered    12/21/22 0915  insulin detemir U-100 pen 5 Units         -- SubQ 2 times daily 12/21/22 0901    12/20/22 1715  insulin aspart U-100 pen 5 Units         -- SubQ 3 times daily with meals 12/20/22 1631    12/19/22 1724  insulin aspart U-100 pen 0-5 Units         -- SubQ Before meals & nightly PRN 12/19/22 1627        Hold Oral hypoglycemics while patient is in the hospital.  - beta-hydroxybutyrate pending  - UA ordered  - endocrinology consulted for A1C 7.5

## 2022-12-21 NOTE — ASSESSMENT & PLAN NOTE
- Unclear if type 1 or type 2, but did present with labs consistent with mild DKA, now resolved  - Based on reports of low blood sugars at NH and further downtrend of glucose overnight with lows early morning we will adjust insulin regimen as below  - A1c reviewed which is 7.5 %, near goal of 7%    Plan:  - Decrease Levemir to 5 units BID  - Decrease Novolog to 5 units TIDAC  - Continue Low dose sliding scale insulin regimen    Later in the day patient refused his lunch insulin due to concerns for dropping low again (BG at that time was in the 90s prior to lunch)

## 2022-12-21 NOTE — SUBJECTIVE & OBJECTIVE
Interval History:   Post HD, no new acute concern, on nasal canula 3L tolerate well, no worsening SOB, no spike of fever.    Review of Systems   Constitutional:  Positive for activity change. Negative for appetite change, chills and fever.   HENT:  Negative for congestion.    Respiratory:  Positive for shortness of breath. Negative for cough, chest tightness and wheezing.    Cardiovascular:  Negative for chest pain, palpitations and leg swelling.   Gastrointestinal:  Positive for abdominal distention. Negative for abdominal pain, constipation, diarrhea, nausea and vomiting.   Musculoskeletal:  Positive for gait problem.   Skin:  Positive for color change and rash.   Neurological:  Positive for weakness. Negative for dizziness, speech difficulty and headaches.   Psychiatric/Behavioral:  Negative for agitation, confusion and hallucinations.    All other systems reviewed and are negative.  Objective:     Vital Signs (Most Recent):  Temp: 97.3 °F (36.3 °C) (12/21/22 1459)  Pulse: 79 (12/21/22 1459)  Resp: 17 (12/21/22 1459)  BP: (!) 192/88 (12/21/22 1459)  SpO2: (!) 93 % (12/21/22 1459)   Vital Signs (24h Range):  Temp:  [97.3 °F (36.3 °C)-98.1 °F (36.7 °C)] 97.3 °F (36.3 °C)  Pulse:  [62-79] 79  Resp:  [16-21] 17  SpO2:  [91 %-99 %] 93 %  BP: (130-192)/(70-94) 192/88     Weight: 78 kg (171 lb 15.3 oz)  Body mass index is 25.39 kg/m².    Intake/Output Summary (Last 24 hours) at 12/21/2022 1525  Last data filed at 12/20/2022 1854  Gross per 24 hour   Intake 360 ml   Output --   Net 360 ml      Physical Exam  Vitals and nursing note reviewed.   Constitutional:       General: He is not in acute distress.     Appearance: He is ill-appearing.   HENT:      Head: Normocephalic and atraumatic.      Nose: No congestion.      Mouth/Throat:      Mouth: Mucous membranes are moist.      Pharynx: No oropharyngeal exudate.   Eyes:      Extraocular Movements: Extraocular movements intact.      Pupils: Pupils are equal, round, and  reactive to light.   Cardiovascular:      Rate and Rhythm: Normal rate. Rhythm irregular.      Heart sounds:     No friction rub. No gallop.   Pulmonary:      Effort: Pulmonary effort is normal. No respiratory distress.      Breath sounds: Rhonchi present. No wheezing.   Chest:      Chest wall: No tenderness.   Abdominal:      General: Bowel sounds are normal. There is distension.      Palpations: Abdomen is soft.      Tenderness: There is no abdominal tenderness. There is no guarding or rebound.   Musculoskeletal:         General: Tenderness present. No swelling.      Cervical back: Neck supple. No rigidity or tenderness.      Right lower leg: No edema.      Left lower leg: No edema.   Skin:     Findings: Bruising, erythema, lesion and rash present.   Neurological:      General: No focal deficit present.      Mental Status: He is alert and oriented to person, place, and time.      Motor: Weakness present.      Coordination: Coordination normal.      Gait: Gait abnormal.   Psychiatric:         Thought Content: Thought content normal.       Significant Labs: All pertinent labs within the past 24 hours have been reviewed.  Recent Lab Results  (Last 5 results in the past 24 hours)        12/21/22  1052   12/21/22  0733   12/21/22  0638   12/21/22  0631   12/21/22  0431        Allens Test               ANION GAP         11       Baso #         0.02       Basophil %         0.5       Site               BUN         44       Calcium         8.0       Chloride         97       CO2         27       Creatinine         5.8       Differential Method         Automated       eGFR         10.5       Eos #         0.1       Eosinophil %         1.6       Glucose         53       Gran # (ANC)         2.7       Gran %         69.3       HEMATOCRIT         31.8       HEMOGLOBIN         10.1       Immature Grans (Abs)         0.00  Comment: Mild elevation in immature granulocytes is non specific and   can be seen in a variety of  conditions including stress response,   acute inflammation, trauma and pregnancy. Correlation with other   laboratory and clinical findings is essential.         Immature Granulocytes         0.0       Lymph #         0.7       Lymph %         17.4       Magnesium         2.3       MCH         28.6       MCHC         31.8       MCV         90       Mono #         0.4       Mono %         11.2       MPV         11.9       nRBC         0       Platelets         214       POC BE               POC HCO3               POC PCO2               POC PH               POC PO2               POC SATURATED O2               POC TCO2               POCT Glucose 99   91   39   42         Potassium         3.6       RBC         3.53       RDW         17.0       Sample               Sodium         135       WBC         3.85                              Significant Imaging: I have reviewed all pertinent imaging results/findings within the past 24 hours.

## 2022-12-21 NOTE — PROGRESS NOTES
Patient arrived to [facility] per stretcher. Maintenance dialysis started via 15 gauge fistula needles to [facility] fistula.

## 2022-12-21 NOTE — PROGRESS NOTES
"[facility] - Cardiology Stepdown  Endocrinology  Progress Note    Admit Date: 2022     Mr. Cosme Lewis is a 57 year old male with medical history significant for hypertension, COPD, diabetes mellitus complicated by ESRD on HD MWF.  He presented to the hospital from his nursing home due to concerns for a nose bleed and acute on chronic respiratory failure.  He was found to have blood sugar greater than 500 with elevated beta hydroxybutyrate, low bicarb and anion gap of 14.  He met criteria, although somewhat mild and was placed back on his outpatient MDI insulin regimen.  Endocrine was then consulted for further assistance in glucose management    Mr. Nowak is a poor historian but states that he was diagnosed with diabetes mellitus around 3 years ago although labs suggest he has been a diabetic since at least .  Uncertain if type 1 or type 2 based on chart review.  He is uncertain of his insulin regimen at nursing facility but he knows he is on both prandial and basal insulin.  He reports that he has had some low blood sugars at the NH.  He admits that his ESRD is a complication of his DM.  Family history includes his mother who was diagnosed with DM.        Outpatient Regimen (Per chart review):  Levemir 15 units BID  Novolog 10 units TIDAC  Low dose sliding scale insulin    Lab Results   Component Value Date    HGBA1C 7.5 (H) 2022             Interval HPI:   Overnight events: Low blood sugar overnight.  Insulin doses of basal changed.    Eatin%  Nausea: No  Hypoglycemia and intervention: Yes  Fever: No  TPN and/or TF: No  If yes, type of TF/TPN and rate: N/A    BP (!) 192/88 (BP Location: Right arm, Patient Position: Lying)   Pulse 79   Temp 97.3 °F (36.3 °C) (Oral)   Resp 17   Ht 5' 9" (1.753 m)   Wt 78 kg (171 lb 15.3 oz)   SpO2 (!) 93%   BMI 25.39 kg/m²     Labs Reviewed and Include    Recent Labs   Lab 22  8991   GLU 53*   CALCIUM 8.0*   *   K 3.6   CO2 27   CL 97   BUN 44* "   CREATININE 5.8*     Lab Results   Component Value Date    WBC 3.85 (L) 12/21/2022    HGB 10.1 (L) 12/21/2022    HCT 31.8 (L) 12/21/2022    MCV 90 12/21/2022     12/21/2022     No results for input(s): TSH, FREET4 in the last 168 hours.  Lab Results   Component Value Date    HGBA1C 7.5 (H) 12/20/2022       Nutritional status:   Body mass index is 25.39 kg/m².  Lab Results   Component Value Date    ALBUMIN 2.5 (L) 12/19/2022    ALBUMIN 2.7 (L) 12/19/2022    ALBUMIN 2.5 (L) 11/27/2022     No results found for: PREALBUMIN    Estimated Creatinine Clearance: 13.7 mL/min (A) (based on SCr of 5.8 mg/dL (H)).    Accu-Checks  Recent Labs     12/20/22  0136 12/20/22  0516 12/20/22  0815 12/20/22  1206 12/20/22  1640 12/20/22  2129 12/21/22  0631 12/21/22  0638 12/21/22  0733 12/21/22  1052   POCTGLUCOSE 269* 161* 88 97 163* 148* 42* 39* 91 99       Current Medications and/or Treatments Impacting Glycemic Control  Immunotherapy:    Immunosuppressants       None          Steroids:   Hormones (From admission, onward)      Start     Stop Route Frequency Ordered    12/19/22 1724  melatonin tablet 9 mg         -- Oral Nightly PRN 12/19/22 1627          Pressors:    Autonomic Drugs (From admission, onward)      None          Hyperglycemia/Diabetes Medications:   Antihyperglycemics (From admission, onward)      Start     Stop Route Frequency Ordered    12/21/22 0915  insulin detemir U-100 pen 5 Units         -- SubQ 2 times daily 12/21/22 0901    12/20/22 1715  insulin aspart U-100 pen 5 Units         -- SubQ 3 times daily with meals 12/20/22 1631    12/19/22 1724  insulin aspart U-100 pen 0-5 Units         -- SubQ Before meals & nightly PRN 12/19/22 1627            ASSESSMENT and PLAN    * Acute on chronic respiratory failure with hypoxia  - Presenting complication, now on nasal canula  - If a component of COPD is thought to be involved then we would need to know if steroids will be needed as insulin needs would likely  change    Diabetes mellitus with chronic kidney disease on chronic dialysis  - Unclear if type 1 or type 2, but did present with labs consistent with mild DKA, now resolved  - Based on reports of low blood sugars at NH and further downtrend of glucose overnight with lows early morning we will adjust insulin regimen as below  - A1c reviewed which is 7.5 %, near goal of 7%    Plan:  - Decrease Levemir to 5 units BID  - Decrease Novolog to 5 units TIDAC  - Continue Low dose sliding scale insulin regimen    Later in the day patient refused his lunch insulin due to concerns for dropping low again (BG at that time was in the 90s prior to lunch)    ESRD on dialysis  - Known ESRD on HD MWF  - Recent missed dialysis appointment  - Carefull of insulin stacking in ESRD, novolog doses should remain spaced every 4 hours          Cole Robles, DO  Endocrinology

## 2022-12-21 NOTE — PROGRESS NOTES
[facility] - Cardiology Stepdown  Nephrology  Progress Note    Patient Name: Cosme Lewis  MRN: [id number]  Admission Date: 12/19/2022  Hospital Length of Stay: 1 days  Attending Provider: Len Easton MD   Primary Care Physician: Primary Doctor No  Principal Problem:Acute on chronic respiratory failure with hypoxia    Subjective:     HPI: Cosme Lewis is a 57 y.o. male with T1DM, ESRD on HD (MWF), HTN, HFpEF, HLD, chronic hypoxemic resp failure 2/2 COPD (on home O2), h/o DVT/PE on Eliquis transported by EMS from [facility] due to uncontrolled nose bleeding. Anuric at baseline. Chesr x ray Reconfirmed enlargement of cardiopericardial silhouette and central pulmonary vascular congestion.  Reconfirmed scattered the extensive bilateral interstitial and some subtle ground-glass opacities that could be on the basis of edema and or infection. He denies CP, fever, aches, chills, N/V/D, and abdominal pain. K 5.9, however hemolyzed. Hypervolemic on exam. Nephrology consulted for ESRD on HD.       Interval History: Patient seen and examined this AM while on iHD. Tolerating well without any physical complaints this morning although appears to have experiencing dyspnea overnight. Afebrile with pulse ranging from 70-60s bpm. Systolic blood pressures ranging from 180-130s mmHg. He is saturating +91% on 2 liters via nasal cannula.     Review of patient's allergies indicates:  No Known Allergies  Current Facility-Administered Medications   Medication Frequency    acetaminophen tablet 650 mg Q6H PRN    albuterol-ipratropium 2.5 mg-0.5 mg/3 mL nebulizer solution 3 mL Q4H PRN    aluminum-magnesium hydroxide-simethicone 200-200-20 mg/5 mL suspension 30 mL QID PRN    apixaban tablet 5 mg BID    aspirin EC tablet 81 mg Daily    atorvastatin tablet 40 mg Daily    carvediloL tablet 6.25 mg BID    cetirizine tablet 10 mg Daily PRN    [START ON 12/24/2022] cloNIDine 0.3 mg/24 hr td ptwk 1 patch Every Sat    dextrose 10%  bolus 125 mL 125 mL PRN    dextrose 10% bolus 250 mL 250 mL PRN    FLUoxetine capsule 40 mg Daily    fluticasone furoate-vilanteroL 100-25 mcg/dose diskus inhaler 1 puff Daily    glucagon (human recombinant) injection 1 mg PRN    glucose chewable tablet 16 g PRN    glucose chewable tablet 24 g PRN    hydrALAZINE injection 10 mg Q4H PRN    hydrALAZINE tablet 100 mg Q8H    insulin aspart U-100 pen 0-5 Units QID (AC + HS) PRN    insulin aspart U-100 pen 10 Units TID WM    insulin detemir U-100 pen 15 Units BID    isosorbide mononitrate 24 hr tablet 30 mg BID    melatonin tablet 9 mg Nightly PRN    naloxone 0.4 mg/mL injection 0.02 mg PRN    NIFEdipine 24 hr tablet 90 mg Daily    polyethylene glycol packet 17 g BID PRN    sodium chloride 0.9% flush 10 mL Q8H PRN    sodium chloride 0.9% flush 10 mL PRN    tiotropium bromide 2.5 mcg/actuation inhaler 2 puff Daily    vitamin renal formula (B-complex-vitamin c-folic acid) 1 mg per capsule 1 capsule Daily       Objective:     Vital Signs (Most Recent):  Temp: 98.1 °F (36.7 °C) (12/20/22 0513)  Pulse: 67 (12/20/22 0721)  Resp: 20 (12/20/22 0607)  BP: (!) 187/94 (12/20/22 0607)  SpO2: 96 % (12/20/22 0513)   Vital Signs (24h Range):  Temp:  [97.5 °F (36.4 °C)-98.1 °F (36.7 °C)] 98.1 °F (36.7 °C)  Pulse:  [66-80] 67  Resp:  [14-20] 20  SpO2:  [93 %-99 %] 96 %  BP: (170-224)/() 187/94     Weight: 78 kg (171 lb 15.3 oz) (12/20/22 0548)  Body mass index is 25.39 kg/m².  Body surface area is 1.95 meters squared.    I/O last 3 completed shifts:  In: 792.7 [I.V.:192.7; Other:600]  Out: 2600 [Other:2600]    Physical Exam  Vitals and nursing note reviewed.   Constitutional:       General: He is awake. He is not in acute distress.     Appearance: He is ill-appearing. He is not diaphoretic.      Interventions: Nasal cannula in place.   HENT:      Head: Normocephalic and atraumatic.      Right Ear: External ear normal.      Left Ear: External ear normal.       Nose: Nose normal.      Mouth/Throat:      Mouth: Mucous membranes are moist.      Pharynx: Oropharynx is clear. No oropharyngeal exudate or posterior oropharyngeal erythema.   Eyes:      General:         Right eye: No discharge.         Left eye: No discharge.      Extraocular Movements: Extraocular movements intact.      Conjunctiva/sclera: Conjunctivae normal.   Cardiovascular:      Rate and Rhythm: Normal rate.      Heart sounds: No murmur heard.    No friction rub. No gallop.      Arteriovenous access: Left arteriovenous access is present.     Comments: Left upper extremity with palpable thrill and audible bruit.   Pulmonary:      Effort: Pulmonary effort is normal. No respiratory distress.      Breath sounds: Rales present. No wheezing or rhonchi.      Comments: Crackles appreciated.  Abdominal:      General: Bowel sounds are normal. There is no distension.      Palpations: Abdomen is soft.      Tenderness: There is no abdominal tenderness.   Musculoskeletal:      Cervical back: Neck supple.      Right lower le+ Pitting Edema present.      Left lower le+ Pitting Edema present.   Skin:     General: Skin is warm and dry.      Coloration: Skin is not jaundiced.   Neurological:      General: No focal deficit present.      Mental Status: He is alert. Mental status is at baseline.      Cranial Nerves: No cranial nerve deficit.      Motor: No weakness.   Psychiatric:         Mood and Affect: Mood normal.         Behavior: Behavior normal. Behavior is cooperative.       Significant Labs:  BMP:   Recent Labs   Lab 22  0438   *   *   K 3.5   CL 97   CO2 23   BUN 33*   CREATININE 4.9*   CALCIUM 7.9*   MG 2.2     CBC:   Recent Labs   Lab 22  0438   WBC 3.66*   RBC 3.70*   HGB 10.3*   HCT 34.1*      MCV 92   MCH 27.8   MCHC 30.2*     CMP:   Recent Labs   Lab 22  1356 22  2137 22  0438   * 417* 180*   CALCIUM 7.6* 7.6* 7.9*   ALBUMIN 2.7* 2.5*  --    PROT 6.6   --   --    * 132* 133*   K 5.9* 3.2* 3.5   CO2 21* 25 23   CL 94* 95 97   BUN 41* 30* 33*   CREATININE 5.5* 4.3* 4.9*   ALKPHOS 200*  --   --    ALT 35  --   --    AST 91*  --   --    BILITOT 0.7  --   --      Coagulation:   Recent Labs   Lab 12/19/22 2137   INR 1.4*   APTT 49.7*     LFTs:   Recent Labs   Lab 12/19/22  1356 12/19/22 2137   ALT 35  --    AST 91*  --    ALKPHOS 200*  --    BILITOT 0.7  --    PROT 6.6  --    ALBUMIN 2.7* 2.5*     Significant Imaging:  I have reviewed all imagining in the last 24 hours.    Assessment/Plan:     Anemia in ESRD (end-stage renal disease)  - Hgb goal in ESRD 10-11  - Currently at goal with value of 10.1      Chronic kidney disease-mineral and bone disorder  -Please obtain RFP for phosphorous and albumin (corrected calcium)     ESRD on dialysis  Cosme Lewis is a 57 y.o. male with T1DM, ESRD on HD (MWF), HTN, HFpEF, HLD, chronic hypoxemic resp failure 2/2 COPD (on home O2 - 5L), h/o DVT/PE on Eliquis transported by EMS from [facility] due to uncontrolled nose bleeding. K 5.9 with hemolysis     Outpatient HD Information:     -Outpatient HD unit: [facility]  -HD tx days: MWF   -Last HD tx prior to presentation: 12/16/22  -HD access: LUE AVF  -HD modality: iHD   -Residual urine: Anuric         Plan/Recommendations:  -HD today for metabolic clearance and volume management  -Renal diet, if not NPO   -Strict I/O's and daily weights  -Daily renal function panels   -Renally dose meds      Thank you for your consult. I will follow-up with patient. Please contact us if you have any additional questions.    Cole Rodriguez MD  Nephrology  [facility] - Cardiology Stepdown

## 2022-12-21 NOTE — PROGRESS NOTES
"[facility] - Cardiology [facility] Medicine  Progress Note    Patient Name: Cosme Lewis  MRN: [id number]  Patient Class: IP- Inpatient   Admission Date: 12/19/2022  Length of Stay: 1 days  Attending Physician: Len Easton MD  Primary Care Provider: Primary Doctor No        Subjective:     Principal Problem:Acute on chronic respiratory failure with hypoxia        HPI:  Cosme Lewis is a 56 yo male with ESRD on HD MWF, hx of VTE on eliquis, HFpEF, COPD, diabetes, and hypertension, who presents with intermittent nosebleeding for the last 3 days. Bleeding currently controlled, he has been taking his eliquis. He also c/o SOB that he noticed today with increased LE swelling. He reports he missed his dialysis session today, last session Friday. He denies fevers or chills, cough, chest pain, palpitations, confusion, lethargy, headache, urinary symptoms, NVD.     In the ED: P 70, /92max. RR16-20. Satting 96% on 4L NC. Afebrile. WBC 3.7K. H/H 10.4/34. . Na 129 (Corrected sodium 135). Glucose 499. K 5.9 (with visible hemolysis). BUN 41/ Cr 5.5. BNP 1701.   CXR: "Reconfirmed enlargement of cardiopericardial silhouette and central pulmonary vascular congestion.  Reconfirmed scattered the extensive bilateral interstitial and some subtle ground-glass opacities that could be on the basis of edema and or infection.  Clinical correlation.  Some increasing density about the right mid lung zone-minor fissure that could relate to progressive atelectasis, layered fluid, or infiltrate.  No obvious pleural fluid blunting right or left costophrenic sulci and no right or left pneumothorax." Given 8U insulin.       Overview/Hospital Course:  No notes on file    Interval History:   Post HD, no new acute concern, on nasal canula 3L tolerate well, no worsening SOB, no spike of fever.    Review of Systems   Constitutional:  Positive for activity change. Negative for appetite change, chills and fever.   HENT:  Negative for " congestion.    Respiratory:  Positive for shortness of breath. Negative for cough, chest tightness and wheezing.    Cardiovascular:  Negative for chest pain, palpitations and leg swelling.   Gastrointestinal:  Positive for abdominal distention. Negative for abdominal pain, constipation, diarrhea, nausea and vomiting.   Musculoskeletal:  Positive for gait problem.   Skin:  Positive for color change and rash.   Neurological:  Positive for weakness. Negative for dizziness, speech difficulty and headaches.   Psychiatric/Behavioral:  Negative for agitation, confusion and hallucinations.    All other systems reviewed and are negative.  Objective:     Vital Signs (Most Recent):  Temp: 97.3 °F (36.3 °C) (12/21/22 1459)  Pulse: 79 (12/21/22 1459)  Resp: 17 (12/21/22 1459)  BP: (!) 192/88 (12/21/22 1459)  SpO2: (!) 93 % (12/21/22 1459)   Vital Signs (24h Range):  Temp:  [97.3 °F (36.3 °C)-98.1 °F (36.7 °C)] 97.3 °F (36.3 °C)  Pulse:  [62-79] 79  Resp:  [16-21] 17  SpO2:  [91 %-99 %] 93 %  BP: (130-192)/(70-94) 192/88     Weight: 78 kg (171 lb 15.3 oz)  Body mass index is 25.39 kg/m².    Intake/Output Summary (Last 24 hours) at 12/21/2022 1525  Last data filed at 12/20/2022 1854  Gross per 24 hour   Intake 360 ml   Output --   Net 360 ml      Physical Exam  Vitals and nursing note reviewed.   Constitutional:       General: He is not in acute distress.     Appearance: He is ill-appearing.   HENT:      Head: Normocephalic and atraumatic.      Nose: No congestion.      Mouth/Throat:      Mouth: Mucous membranes are moist.      Pharynx: No oropharyngeal exudate.   Eyes:      Extraocular Movements: Extraocular movements intact.      Pupils: Pupils are equal, round, and reactive to light.   Cardiovascular:      Rate and Rhythm: Normal rate. Rhythm irregular.      Heart sounds:     No friction rub. No gallop.   Pulmonary:      Effort: Pulmonary effort is normal. No respiratory distress.      Breath sounds: Rhonchi present. No  wheezing.   Chest:      Chest wall: No tenderness.   Abdominal:      General: Bowel sounds are normal. There is distension.      Palpations: Abdomen is soft.      Tenderness: There is no abdominal tenderness. There is no guarding or rebound.   Musculoskeletal:         General: Tenderness present. No swelling.      Cervical back: Neck supple. No rigidity or tenderness.      Right lower leg: No edema.      Left lower leg: No edema.   Skin:     Findings: Bruising, erythema, lesion and rash present.   Neurological:      General: No focal deficit present.      Mental Status: He is alert and oriented to person, place, and time.      Motor: Weakness present.      Coordination: Coordination normal.      Gait: Gait abnormal.   Psychiatric:         Thought Content: Thought content normal.       Significant Labs: All pertinent labs within the past 24 hours have been reviewed.  Recent Lab Results  (Last 5 results in the past 24 hours)        12/21/22  1052   12/21/22  0733   12/21/22  0638   12/21/22  0631   12/21/22  0431        Allens Test               ANION GAP         11       Baso #         0.02       Basophil %         0.5       Site               BUN         44       Calcium         8.0       Chloride         97       CO2         27       Creatinine         5.8       Differential Method         Automated       eGFR         10.5       Eos #         0.1       Eosinophil %         1.6       Glucose         53       Gran # (ANC)         2.7       Gran %         69.3       HEMATOCRIT         31.8       HEMOGLOBIN         10.1       Immature Grans (Abs)         0.00  Comment: Mild elevation in immature granulocytes is non specific and   can be seen in a variety of conditions including stress response,   acute inflammation, trauma and pregnancy. Correlation with other   laboratory and clinical findings is essential.         Immature Granulocytes         0.0       Lymph #         0.7       Lymph %         17.4       Magnesium          2.3       MCH         28.6       MCHC         31.8       MCV         90       Mono #         0.4       Mono %         11.2       MPV         11.9       nRBC         0       Platelets         214       POC BE               POC HCO3               POC PCO2               POC PH               POC PO2               POC SATURATED O2               POC TCO2               POCT Glucose 99   91   39   42         Potassium         3.6       RBC         3.53       RDW         17.0       Sample               Sodium         135       WBC         3.85                              Significant Imaging: I have reviewed all pertinent imaging results/findings within the past 24 hours.      Assessment/Plan:      * Acute on chronic respiratory failure with hypoxia  Patient with Hypoxic Respiratory failure which is Acute on chronic.  he is on home oxygen at 2 LPM. Supplemental oxygen was provided and noted- 4-5L NC  .   Signs/symptoms of respiratory failure include- tachypnea, increased work of breathing and wheezing. Contributing diagnoses includes - CHF and Hypervolemia Labs and images were reviewed. Patient Has not had a recent ABG. Will treat underlying causes and adjust management of respiratory failure as follows-  - Volume management with HD  - Fluid overload from CHF    Malnutrition of mild degree  -Dietician consult  -Will give supplement      HLD (hyperlipidemia)  - continue statin      Renovascular hypertension  - continue clonidine, nifedipine, hydralazine, coreg, imdur      Anemia in ESRD (end-stage renal disease)  - stable, at baseline  - nose bleeding controlled   - continue eliquis for now, monitor daily CBC      Acute on chronic diastolic heart failure  Pulmonary hypertension    · CHF Pathway initiated  · Recent 2D echo with grade 2 diastolic heart failure, preserved EF, and moderate-severe pulmonary hypertension (see below)  · BNP >1700 and CXR with pulmonary edema  · Defer to nephrology for volume control with  HD  · Strict I&Os with daily weights. Admit weight 186 lb.   · Continue asa, statin, coreg, imdur, BP control    Results for orders placed during the hospital encounter of 11/18/22    Echo    Interpretation Summary  · The left ventricle is normal in size with mild concentric hypertrophy and increased density and normal systolic function.  · The estimated ejection fraction is 63%.  · Grade II left ventricular diastolic dysfunction.  · Severe left atrial enlargement.  · Mild right ventricular enlargement with low normal right ventricular systolic function.  · There is right ventricular hypertrophy.  · Moderate right atrial enlargement.  · Mild to moderate tricuspid regurgitation.  · Mild pulmonic regurgitation.  · Elevated central venous pressure (15 mmHg).  · The estimated PA systolic pressure is 67 mmHg.  · There is moderate-severe pulmonary hypertension.  · Trivial posterolateral pericardial effusion.  · There is a moderate left pleural effusion.  · There may be some ascites present.      Other emphysema  - CXR reviewed  - does not appear to be in COPD exacerbation at this time; presentation more c/w CHF/ volume overload  - continue LAMA and ICS-LABA inhalers      Diabetes mellitus with chronic kidney disease on chronic dialysis  Patient's FSGs are uncontrolled due to hyperglycemia on current medication regimen.  Last A1c reviewed-   Lab Results   Component Value Date    HGBA1C 7.5 (H) 12/20/2022     Most recent fingerstick glucose reviewed-   Recent Labs   Lab 12/21/22  0631 12/21/22  0638 12/21/22  0733 12/21/22  1052   POCTGLUCOSE 42* 39* 91 99     Current correctional scale  Low  Maintain anti-hyperglycemic dose as follows-   Antihyperglycemics (From admission, onward)    Start     Stop Route Frequency Ordered    12/21/22 0915  insulin detemir U-100 pen 5 Units         -- SubQ 2 times daily 12/21/22 0901    12/20/22 1715  insulin aspart U-100 pen 5 Units         -- SubQ 3 times daily with meals 12/20/22 1631     12/19/22 1724  insulin aspart U-100 pen 0-5 Units         -- SubQ Before meals & nightly PRN 12/19/22 1627        Hold Oral hypoglycemics while patient is in the hospital.  - beta-hydroxybutyrate pending  - UA ordered  - endocrinology consulted for A1C 7.5    ESRD on dialysis  CKD mineral and bone disorder    Creatine and BUN elevated from baseline on admit. BMP reviewed- noted Estimated Creatinine Clearance: 13.7 mL/min (A) (based on SCr of 5.8 mg/dL (H)). according to latest data. Monitor UOP and serial BMP and adjust therapy as needed. Renally dose meds.  - trend renal function panel   - continue daily renal vitamin  - Nephrology consulted for HD assistance            VTE Risk Mitigation (From admission, onward)         Ordered     apixaban tablet 5 mg  2 times daily         12/19/22 1627     IP VTE HIGH RISK PATIENT  Once         12/19/22 1627     Place sequential compression device  Until discontinued         12/19/22 1627                Discharge Planning   GERA: 12/22/2022     Code Status: Full Code   Is the patient medically ready for discharge?:     Reason for patient still in hospital (select all that apply): Patient trending condition and Treatment  Discharge Plan A: Return to nursing home          Time spent >30 minutes        Len Easton MD  Department of Hospital Medicine   [facility] - Cardiology Stepdown

## 2022-12-21 NOTE — ASSESSMENT & PLAN NOTE
Patient with Hypoxic Respiratory failure which is Acute on chronic.  he is on home oxygen at 2 LPM. Supplemental oxygen was provided and noted- 4-5L NC  .   Signs/symptoms of respiratory failure include- tachypnea, increased work of breathing and wheezing. Contributing diagnoses includes - CHF and Hypervolemia Labs and images were reviewed. Patient Has not had a recent ABG. Will treat underlying causes and adjust management of respiratory failure as follows-  - Volume management with HD  - Fluid overload from CHF

## 2022-12-21 NOTE — SUBJECTIVE & OBJECTIVE
"Interval HPI:   Overnight events: Low blood sugar overnight.  Insulin doses of basal changed.    Eatin%  Nausea: No  Hypoglycemia and intervention: Yes  Fever: No  TPN and/or TF: No  If yes, type of TF/TPN and rate: N/A    BP (!) 192/88 (BP Location: Right arm, Patient Position: Lying)   Pulse 79   Temp 97.3 °F (36.3 °C) (Oral)   Resp 17   Ht 5' 9" (1.753 m)   Wt 78 kg (171 lb 15.3 oz)   SpO2 (!) 93%   BMI 25.39 kg/m²     Labs Reviewed and Include    Recent Labs   Lab 22  0431   GLU 53*   CALCIUM 8.0*   *   K 3.6   CO2 27   CL 97   BUN 44*   CREATININE 5.8*     Lab Results   Component Value Date    WBC 3.85 (L) 2022    HGB 10.1 (L) 2022    HCT 31.8 (L) 2022    MCV 90 2022     2022     No results for input(s): TSH, FREET4 in the last 168 hours.  Lab Results   Component Value Date    HGBA1C 7.5 (H) 2022       Nutritional status:   Body mass index is 25.39 kg/m².  Lab Results   Component Value Date    ALBUMIN 2.5 (L) 2022    ALBUMIN 2.7 (L) 2022    ALBUMIN 2.5 (L) 2022     No results found for: PREALBUMIN    Estimated Creatinine Clearance: 13.7 mL/min (A) (based on SCr of 5.8 mg/dL (H)).    Accu-Checks  Recent Labs     22  0136 22  0516 22  0815 22  1206 22  1640 22  2129 22  0631 22  0638 22  0733 22  1052   POCTGLUCOSE 269* 161* 88 97 163* 148* 42* 39* 91 99       Current Medications and/or Treatments Impacting Glycemic Control  Immunotherapy:    Immunosuppressants       None          Steroids:   Hormones (From admission, onward)      Start     Stop Route Frequency Ordered    22 1724  melatonin tablet 9 mg         -- Oral Nightly PRN 22 1253          Pressors:    Autonomic Drugs (From admission, onward)      None          Hyperglycemia/Diabetes Medications:   Antihyperglycemics (From admission, onward)      Start     Stop Route Frequency Ordered    22 " 0915  insulin detemir U-100 pen 5 Units         -- SubQ 2 times daily 12/21/22 0901    12/20/22 1715  insulin aspart U-100 pen 5 Units         -- SubQ 3 times daily with meals 12/20/22 1631    12/19/22 1724  insulin aspart U-100 pen 0-5 Units         -- SubQ Before meals & nightly PRN 12/19/22 1627

## 2022-12-21 NOTE — ASSESSMENT & PLAN NOTE
CKD mineral and bone disorder    Creatine and BUN elevated from baseline on admit. BMP reviewed- noted Estimated Creatinine Clearance: 13.7 mL/min (A) (based on SCr of 5.8 mg/dL (H)). according to latest data. Monitor UOP and serial BMP and adjust therapy as needed. Renally dose meds.  - trend renal function panel   - continue daily renal vitamin  - Nephrology consulted for HD assistance

## 2022-12-22 NOTE — SUBJECTIVE & OBJECTIVE
"Interval HPI:   Overnight events: No low blood glucose overnight.  No new overnight endocrine issues.    Eatin%  Nausea: No  Hypoglycemia and intervention: No  Fever: No  TPN and/or TF: No  If yes, type of TF/TPN and rate: N/A    BP (!) 164/82 (Patient Position: Lying)   Pulse 78   Temp 98.1 °F (36.7 °C)   Resp 20   Ht 5' 9" (1.753 m)   Wt 78 kg (171 lb 15.3 oz)   SpO2 95%   BMI 25.39 kg/m²     Labs Reviewed and Include    Recent Labs   Lab 22  0438   *   CALCIUM 8.1*   *   K 4.0   CO2 23      BUN 29*   CREATININE 4.7*     Lab Results   Component Value Date    WBC 3.45 (L) 2022    HGB 9.7 (L) 2022    HCT 31.7 (L) 2022    MCV 93 2022     2022     No results for input(s): TSH, FREET4 in the last 168 hours.  Lab Results   Component Value Date    HGBA1C 7.5 (H) 2022       Nutritional status:   Body mass index is 25.39 kg/m².  Lab Results   Component Value Date    ALBUMIN 2.5 (L) 2022    ALBUMIN 2.7 (L) 2022    ALBUMIN 2.5 (L) 2022     No results found for: PREALBUMIN    Estimated Creatinine Clearance: 16.9 mL/min (A) (based on SCr of 4.7 mg/dL (H)).    Accu-Checks  Recent Labs     22  0815 22  1206 22  1640 22  2129 22  0631 22  0638 22  0733 22  1052 22  1709 22  2215   POCTGLUCOSE 88 97 163* 148* 42* 39* 91 99 185* 226*       Current Medications and/or Treatments Impacting Glycemic Control  Immunotherapy:    Immunosuppressants       None          Steroids:   Hormones (From admission, onward)      Start     Stop Route Frequency Ordered    22 1724  melatonin tablet 9 mg         -- Oral Nightly PRN 22 1627          Pressors:    Autonomic Drugs (From admission, onward)      None          Hyperglycemia/Diabetes Medications:   Antihyperglycemics (From admission, onward)      Start     Stop Route Frequency Ordered    22 1200  insulin aspart U-100 " pen 6 Units         -- SubQ 3 times daily with meals 12/22/22 0958    12/21/22 0915  insulin detemir U-100 pen 5 Units         -- SubQ 2 times daily 12/21/22 0901    12/19/22 1724  insulin aspart U-100 pen 0-5 Units         -- SubQ Before meals & nightly PRN 12/19/22 1623

## 2022-12-22 NOTE — PLAN OF CARE
[facility]     Department of Hospital Medicine     [facility] [place] [address]     [phone] after hours  [phone] fax       NURSING HOME ORDERS    Patient Name: Cosme Lewis  YOB: [birth year]    Admit to Nursing Home:  Regular Bed         Diagnoses:  Active Hospital Problems    Diagnosis  POA    *Acute on chronic respiratory failure with hypoxia [J96.21]  Yes    Malnutrition of mild degree [E44.1]  Yes    DKA (diabetic ketoacidosis) [E11.10]  Unknown    Pulmonary hypertension [I27.20]  Yes    Anemia in ESRD (end-stage renal disease) [N18.6, D63.1]  Yes    Renovascular hypertension [I15.0]  Yes    HLD (hyperlipidemia) [E78.5]  Yes    Chronic kidney disease-mineral and bone disorder [N18.9, E83.9, M89.9]  Yes    Diabetes mellitus with chronic kidney disease on chronic dialysis [E10.22, N18.6, Z99.2]  Not Applicable    Acute on chronic diastolic heart failure [I50.33]  Yes    Other emphysema [J43.8]  Yes    ESRD on dialysis [N18.6, Z99.2]  Not Applicable      Resolved Hospital Problems   No resolved problems to display.       Patient is homebound due to:  Acute on chronic respiratory failure with hypoxia    Allergies:Review of patient's allergies indicates:  No Known Allergies    Vitals:        Every shift (Skilled Nursing patients)    Diet: cardiac diet, diabetic diet: 2000 calorie, and renal diet                  Acitivities:    - Up in a chair each morning as tolerated  - Ambulate with assistance to bathroom  - Scheduled walks once each shift (every 8 hours)  - May ambulate independently  - May use walker, cane, or self-propelled wheelchair       - Weight bearing: as tolerated      LABS:  Per facility protocol    Nursing Precautions:     oxygen 2L via NC continuously      - Aspiration precautions:             - Total assistance with meals            -  Upright 90 degrees befor during and after meals             -  Suction at bedside           - Fall precautions per nursing home protocol   - Seizure precaution per jail protocol   - Decubitus precautions:        -  for positioning   - Pressure reducing foam mattress   - Turn patient every two hours. Use wedge pillows to anchor patient    CONSULTS:       Physical Therapy to evaluate and treat     Occupational Therapy to evaluate and treat     Speech Therapy  to evaluate and treat     Nutrition to evaluate and recommend diet       MISCELLANEOUS CARE:          Routine Skin for Bedridden Patients:  Apply moisture barrier cream to all    skin folds and wet areas in perineal area daily and after baths and                                     DIABETES CARE:         Check blood sugar:   .    Fingerstick blood sugar AC and HS         Report CBG < 60 or > 400 to physician.              Glucose  Novolog Insulin Subcutaneous        0 - 60   Orange juice or glucose tablet, hold insulin      No insulin   201-250  1 units   251-300  2 units   301-350  3 units   351-400  4 units   >400   5 units then call physician     Medications: Discontinue all previous medication orders, if any. See new list below.        Medication List        Change how you take these medications      insulin aspart U-100 100 unit/mL (3 mL) Inpn pen  Commonly known as: NovoLOG  Inject 5 Units into the skin 3 (three) times daily with meals.  What changed:   how much to take  Another medication with the same name was removed. Continue taking this medication, and follow the directions you see here.     insulin detemir U-100 100 unit/mL (3 mL) Inpn pen  Commonly known as: Levemir FLEXTOUCH  Inject 12 Units into the skin once daily.  What changed:   how much to take  when to take this            Continue taking these medications      acetaminophen 650 MG Tbsr  Commonly known as: TYLENOL  Take 650 mg by mouth every 8 (eight) hours as needed (pain or fever over 100.4).     albuterol 90 mcg/actuation inhaler  Commonly known as:  "PROVENTIL/VENTOLIN HFA  Inhale 2 puffs into the lungs every 6 (six) hours as needed for Wheezing or Shortness of Breath.     apixaban 5 mg Tab  Commonly known as: ELIQUIS  Take 1 tablet (5 mg total) by mouth 2 (two) times daily. Start this dose AFTER you have completed the 7 days of the 10 mg dose.     aspirin 81 MG EC tablet  Commonly known as: ECOTRIN  Take 81 mg by mouth once daily.     atorvastatin 40 MG tablet  Commonly known as: LIPITOR  Take 1 tablet (40 mg total) by mouth once daily.     BD ULTRA-FINE RODRÍGUEZ PEN NEEDLE 32 gauge x 5/32" Ndle  Generic drug: pen needle, diabetic  Use to inject 1 each into the skin 5 (five) times daily.     carvediloL 3.125 MG tablet  Commonly known as: COREG  Take 2 tablets (6.25 mg total) by mouth 2 (two) times daily.     cetirizine 10 MG tablet  Commonly known as: ZYRTEC  Take 10 mg by mouth daily as needed (itching).     cloNIDine 0.3 mg/24 hr td ptwk 0.3 mg/24 hr  Commonly known as: CATAPRES  Place 1 patch onto the skin every Saturday.     FLUoxetine 40 MG capsule  Take 40 mg by mouth once daily.     fluticasone-salmeterol 250-50 mcg/dose 250-50 mcg/dose diskus inhaler  Commonly known as: ADVAIR  Inhale 1 puff into the lungs 2 (two) times daily.     GLUCAGON EMERGENCY KIT (HUMAN) 1 mg Solr  Generic drug: glucagon  1 mg into the muscle as needed if CBG < 70     hydrALAZINE 100 MG tablet  Commonly known as: APRESOLINE  Take 1 tablet (100 mg total) by mouth every 8 (eight) hours.     HYDROPHILIC CREAM TOP  Apply topically. Apply liberal amount to affected area twice daily for dry skin     isosorbide mononitrate 30 MG 24 hr tablet  Commonly known as: IMDUR  Take 30 mg by mouth 2 (two) times daily.     melatonin 3 mg Tbdl  Take 9 mg by mouth nightly as needed (sleep).     NIFEdipine 90 MG (OSM) 24 hr tablet  Commonly known as: PROCARDIA-XL  Take 1 tablet (90 mg total) by mouth once daily.     sodium chloride 0.65 % nasal spray  Commonly known as: OCEAN  2 sprays by Nasal route " every 4 (four) hours as needed for Congestion.     tiotropium bromide 2.5 mcg/actuation inhaler  Commonly known as: SPIRIVA RESPIMAT  Inhale 2 puffs into the lungs Daily.     triamcinolone acetonide 0.025 % Lotn  Apply topically. Apply to the affected area twice daily     vitamin renal formula (B-complex-vitamin c-folic acid) 1 mg Cap  Commonly known as: NEPHROCAP  Take 1 capsule by mouth once daily.                   _________________________________  Jonathan Marie MD  12/22/2022

## 2022-12-22 NOTE — ASSESSMENT & PLAN NOTE
CKD mineral and bone disorder    Creatine and BUN elevated from baseline on admit. BMP reviewed- noted Estimated Creatinine Clearance: 16.9 mL/min (A) (based on SCr of 4.7 mg/dL (H)). according to latest data. Monitor UOP and serial BMP and adjust therapy as needed. Renally dose meds.  - trend renal function panel   - continue daily renal vitamin  - Nephrology consulted for HD assistance

## 2022-12-22 NOTE — ASSESSMENT & PLAN NOTE
- stable, at baseline  - nose bleeding controlled   - continue eliquis for now, monitor daily CBC  12/22      Patient's with Normocytic anemia.. Hemoglobin stable. Etiology likely due to chronic disease .  Current CBC reviewed-  Recent Labs   Lab 12/20/22  0438 12/21/22  0431 12/22/22  0438   HGB 10.3* 10.1* 9.7*     Monitor CBC and transfuse if H/H drops below 7/21.

## 2022-12-22 NOTE — PLAN OF CARE
Pt returning to [facility] care with [facility]. Transportation requested through [facility]. Orders sent to  [facility]. RN given phone number to call report.    Ivory Nur, [facility]  Case Management Department  [email or web address]       12/22/22 1321   Final Note   Assessment Type Final Discharge Note   Anticipated Discharge Disposition assisted Nu   What phone number can be called within the next 1-3 days to see how you are doing after discharge? [phone]   Hospital Resources/Appts/Education Provided Provided patient/caregiver with written discharge plan information;Appointments scheduled and added to AVS;Appointments scheduled by Navigator/Coordinator   Post-Acute Status   Post-Acute Authorization Placement   Post-Acute Placement Status Set-up Complete/Auth obtained   Discharge Delays None known at this time

## 2022-12-22 NOTE — ASSESSMENT & PLAN NOTE
Patient's FSGs are uncontrolled due to hyperglycemia on current medication regimen.  Last A1c reviewed-   Lab Results   Component Value Date    HGBA1C 7.5 (H) 12/20/2022     Most recent fingerstick glucose reviewed-   Recent Labs   Lab 12/21/22  0733 12/21/22  1052 12/21/22  1709 12/21/22  2215   POCTGLUCOSE 91 99 185* 226*     Current correctional scale  Low  Maintain anti-hyperglycemic dose as follows-   Antihyperglycemics (From admission, onward)    Start     Stop Route Frequency Ordered    12/22/22 0745  insulin aspart U-100 pen 4 Units         -- SubQ 3 times daily with meals 12/21/22 1712    12/21/22 0915  insulin detemir U-100 pen 5 Units         -- SubQ 2 times daily 12/21/22 0901    12/19/22 1724  insulin aspart U-100 pen 0-5 Units         -- SubQ Before meals & nightly PRN 12/19/22 1627        Hold Oral hypoglycemics while patient is in the hospital.  - beta-hydroxybutyrate pending  - UA ordered  - endocrinology consulted for A1C 7.5  12/22 endocrine following for labs consistent with mild DKA, now resolved. Hypoglycemia of 30s on 12/221 Decreased Levemir to 5 units BID and  Novolog to 5 units TIDAC.

## 2022-12-22 NOTE — PLAN OF CARE
Pt vss. B poc bg 226. Pt likes to snack throughout the night. Pt had one episode of feeling SOB. Pt was readjusted in bed, o2 increased for a min and then back to 2L nc. Pt asked for a snack and no more complaints.    Problem: Adult Inpatient Plan of Care  Goal: Plan of Care Review  Outcome: Ongoing, Progressing  Goal: Patient-Specific Goal (Individualized)  Outcome: Ongoing, Progressing  Goal: Absence of Hospital-Acquired Illness or Injury  Outcome: Ongoing, Progressing  Goal: Optimal Comfort and Wellbeing  Outcome: Ongoing, Progressing  Goal: Readiness for Transition of Care  Outcome: Ongoing, Progressing     Problem: Infection  Goal: Absence of Infection Signs and Symptoms  Outcome: Ongoing, Progressing     Problem: Device-Related Complication Risk (Hemodialysis)  Goal: Safe, Effective Therapy Delivery  Outcome: Ongoing, Progressing     Problem: Hemodynamic Instability (Hemodialysis)  Goal: Effective Tissue Perfusion  Outcome: Ongoing, Progressing     Problem: Infection (Hemodialysis)  Goal: Absence of Infection Signs and Symptoms  Outcome: Ongoing, Progressing     Problem: Diabetes Comorbidity  Goal: Blood Glucose Level Within Targeted Range  Outcome: Ongoing, Progressing     Problem: Skin Injury Risk Increased  Goal: Skin Health and Integrity  Outcome: Ongoing, Progressing     Problem: Fall Injury Risk  Goal: Absence of Fall and Fall-Related Injury  Outcome: Ongoing, Progressing

## 2022-12-22 NOTE — PROGRESS NOTES
Dialysis completed. Needles removed from UMA fistula with pressure held to sites for 10 minutes each with hemostasis achieved. Gauze and tape applied. Patient dialyzed for 3 hours with fluid removal of 3 liters. Tolerated well with stable vital signs.

## 2022-12-22 NOTE — NURSING
Attempt to call report to [facility] with no success.  Bhumika Espino RN  12/22/2022

## 2022-12-22 NOTE — ASSESSMENT & PLAN NOTE
Patient with Hypoxic Respiratory failure which is Acute on chronic.  he is on home oxygen at 2 LPM. Supplemental oxygen was provided and noted- 4-5L NC Oxygen Concentration (%):  [40] 40.   Signs/symptoms of respiratory failure include- tachypnea, increased work of breathing and wheezing. Contributing diagnoses includes - CHF and Hypervolemia Labs and images were reviewed. Patient Has not had a recent ABG. Will treat underlying causes and adjust management of respiratory failure as follows-  - Volume management with HD  - Fluid overload from CHF  12/22  sats 94% on 2LNC

## 2022-12-22 NOTE — ASSESSMENT & PLAN NOTE
- Unclear if type 1 or type 2, but did present with labs consistent with mild DKA, now resolved  - Based on reports of low blood sugars at NH and further downtrend of glucose overnight with lows early morning we will adjust insulin regimen as below  - A1c reviewed which is 7.5 %, near goal of 7%    Plan:  - Increase Levemir to 6 units BID  - Continue Novolog 5 units TIDAC  - Continue Low dose sliding scale insulin regimen      **Discharge Recs:  Continue regimen as above.  If switched to Lantus, Tresiba or other long acting insulin besides Levemir then this can be given as a single dose (12 units).  Glucose can be monitored with physician at nursing facility for further insulin adjustments from there.

## 2022-12-22 NOTE — DISCHARGE SUMMARY
"Nick Menjivar - Cardiology [facility] [place] Medicine  Discharge Summary      Patient Name: Cosme Lewis  MRN: [id number]  MARCY: [id number]  Patient Class: IP- Inpatient  Admission Date: 12/19/2022  Hospital Length of Stay: 2 days  Discharge Date and Time:  12/22/2022 12:45 PM  Attending Physician: Jonathan Marie MD   Discharging Provider: Jonathan Marie MD  Primary Care Provider: Primary [facility] Medicine Team: [facility] S Jonathan Marie MD  Primary Care Team: [facility]    HPI:   Cosme Lewis is a 58 yo male with ESRD on HD MWF, hx of VTE on eliquis, HFpEF, COPD, diabetes, and hypertension, who presents with intermittent nosebleeding for the last 3 days. Bleeding currently controlled, he has been taking his eliquis. He also c/o SOB that he noticed today with increased LE swelling. He reports he missed his dialysis session today, last session Friday. He denies fevers or chills, cough, chest pain, palpitations, confusion, lethargy, headache, urinary symptoms, NVD.     In the ED: P 70, /92max. RR16-20. Satting 96% on 4L NC. Afebrile. WBC 3.7K. H/H 10.4/34. . Na 129 (Corrected sodium 135). Glucose 499. K 5.9 (with visible hemolysis). BUN 41/ Cr 5.5. BNP 1701.   CXR: "Reconfirmed enlargement of cardiopericardial silhouette and central pulmonary vascular congestion.  Reconfirmed scattered the extensive bilateral interstitial and some subtle ground-glass opacities that could be on the basis of edema and or infection.  Clinical correlation.  Some increasing density about the right mid lung zone-minor fissure that could relate to progressive atelectasis, layered fluid, or infiltrate.  No obvious pleural fluid blunting right or left costophrenic sulci and no right or left pneumothorax." Given 8U insulin.       * No surgery found *      Hospital Course:   12/22  H/o  T1DM, ESRD on HD (MWF), HTN, HFpEF, HLD, chronic hypoxemic resp failure 2/2 COPD (on home O2 - 5L), h/o DVT/PE on Eliquis " transported by EMS from [facility] due to uncontrolled nose bleeding.  Episode of nose bleed resolved.  continue HD MWF. endocrine following for labs consistent with mild DKA, now resolved. Hypoglycemia of 30s on 12/221 Decreased Levemir to 5 units BID and  Novolog to 5 units TIDAC. sats 94% on 2LNC. continue eliquis for H/O DVT . discharge to [facility] today         Goals of Care Treatment Preferences:  Code Status: Full Code      Consults:   Consults (From admission, onward)        Status Ordering Provider     Inpatient consult to Social Work/Case Management  Once        Provider:  (Not yet assigned)    Acknowledged BAYRON MAC     Inpatient consult to Endocrinology  Once        Provider:  (Not yet assigned)    Completed BAYRON MAC     Inpatient consult to Nephrology  Once        Provider:  (Not yet assigned)    Completed BRYN REESE           Assessment/Plan:      * Acute on chronic respiratory failure with hypoxia  Patient with Hypoxic Respiratory failure which is Acute on chronic.  he is on home oxygen at 2 LPM. Supplemental oxygen was provided and noted- 4-5L NC Oxygen Concentration (%):  [40] 40.   Signs/symptoms of respiratory failure include- tachypnea, increased work of breathing and wheezing. Contributing diagnoses includes - CHF and Hypervolemia Labs and images were reviewed. Patient Has not had a recent ABG. Will treat underlying causes and adjust management of respiratory failure as follows-  - Volume management with HD  - Fluid overload from CHF  12/22  sats 94% on 2LNC     Malnutrition of mild degree  -Dietician consult  -Will give supplement        DKA (diabetic ketoacidosis)  Patient's FSGs are controlled on current medication regimen.  Last A1c reviewed-         Lab Results   Component Value Date     HGBA1C 7.5 (H) 12/20/2022      Most recent fingerstick glucose reviewed-          Recent Labs   Lab 12/21/22  0733 12/21/22  1052 12/21/22  1709 12/21/22 2215    POCTGLUCOSE 91 99 185* 226*       anti-hyperglycemic dose as follows-   Antihyperglycemics (From admission, onward)        Start     Stop Route Frequency Ordered     12/22/22 0745   insulin aspart U-100 pen 4 Units         -- SubQ 3 times daily with meals 12/21/22 1712     12/21/22 0915   insulin detemir U-100 pen 5 Units         -- SubQ 2 times daily 12/21/22 0901     12/19/22 1724   insulin aspart U-100 pen 0-5 Units         -- SubQ Before meals & nightly PRN 12/19/22 1627              12/22   . endocrine following for labs consistent with mild DKA, now resolved. Hypoglycemia of 30s on 12/221 Decreased Levemir to 5 units BID and  Novolog to 5 units TIDAC.      Pulmonary hypertension           HLD (hyperlipidemia)  - continue statin        Renovascular hypertension  - continue clonidine, nifedipine, hydralazine, coreg, imdur        Anemia in ESRD (end-stage renal disease)  - stable, at baseline  - nose bleeding controlled   - continue eliquis for now, monitor daily CBC  12/22      Patient's with Normocytic anemia.. Hemoglobin stable. Etiology likely due to chronic disease .        Current CBC reviewed-  Recent Labs   Lab 12/20/22  0438 12/21/22  0431 12/22/22  0438   HGB 10.3* 10.1* 9.7*      Monitor CBC and transfuse if H/H drops below 7/21.      Acute on chronic diastolic heart failure  Pulmonary hypertension      CHF Pathway initiated   Recent 2D echo with grade 2 diastolic heart failure, preserved EF, and moderate-severe pulmonary hypertension (see below)   BNP >1700 and CXR with pulmonary edema   Defer to nephrology for volume control with HD   Strict I&Os with daily weights. Admit weight 186 lb.    Continue asa, statin, coreg, imdur, BP control     Results for orders placed during the hospital encounter of 11/18/22     Echo     Interpretation Summary  · The left ventricle is normal in size with mild concentric hypertrophy and increased density and normal systolic function.  · The estimated ejection  fraction is 63%.  · Grade II left ventricular diastolic dysfunction.  · Severe left atrial enlargement.  · Mild right ventricular enlargement with low normal right ventricular systolic function.  · There is right ventricular hypertrophy.  · Moderate right atrial enlargement.  · Mild to moderate tricuspid regurgitation.  · Mild pulmonic regurgitation.  · Elevated central venous pressure (15 mmHg).  · The estimated PA systolic pressure is 67 mmHg.  · There is moderate-severe pulmonary hypertension.  · Trivial posterolateral pericardial effusion.  · There is a moderate left pleural effusion.  · There may be some ascites present.        Other emphysema  - CXR reviewed  - does not appear to be in COPD exacerbation at this time; presentation more c/w CHF/ volume overload  - continue LAMA and ICS-LABA inhalers        Chronic kidney disease-mineral and bone disorder     as above     Diabetes mellitus with chronic kidney disease on chronic dialysis  Patient's FSGs are uncontrolled due to hyperglycemia on current medication regimen.  Last A1c reviewed-         Lab Results   Component Value Date     HGBA1C 7.5 (H) 12/20/2022      Most recent fingerstick glucose reviewed-          Recent Labs   Lab 12/21/22  0733 12/21/22  1052 12/21/22  1709 12/21/22  2215   POCTGLUCOSE 91 99 185* 226*      Current correctional scale  Low  Maintain anti-hyperglycemic dose as follows-   Antihyperglycemics (From admission, onward)        Start     Stop Route Frequency Ordered     12/22/22 0745   insulin aspart U-100 pen 4 Units         -- SubQ 3 times daily with meals 12/21/22 1712     12/21/22 0915   insulin detemir U-100 pen 5 Units         -- SubQ 2 times daily 12/21/22 0901     12/19/22 1724   insulin aspart U-100 pen 0-5 Units         -- SubQ Before meals & nightly PRN 12/19/22 1627             Hold Oral hypoglycemics while patient is in the hospital.  - beta-hydroxybutyrate pending  - UA ordered  - endocrinology consulted for A1C  7.5  12/22 endocrine following for labs consistent with mild DKA, now resolved. Hypoglycemia of 30s on 12/221 Decreased Levemir to 5 units BID and  Novolog to 5 units TIDAC.       ESRD on dialysis  CKD mineral and bone disorder     Creatine and BUN elevated from baseline on admit. BMP reviewed- noted Estimated Creatinine Clearance: 16.9 mL/min (A) (based on SCr of 4.7 mg/dL (H)). according to latest data. Monitor UOP and serial BMP and adjust therapy as needed. Renally dose meds.  - trend renal function panel   - continue daily renal vitamin  - Nephrology consulted for HD assistance         Final Active Diagnoses:    Diagnosis Date Noted POA    PRINCIPAL PROBLEM:  Acute on chronic respiratory failure with hypoxia [J96.21] 10/12/2022 Yes    Malnutrition of mild degree [E44.1] 12/21/2022 Yes    DKA (diabetic ketoacidosis) [E11.10] 12/20/2022 Unknown    Pulmonary hypertension [I27.20] 12/19/2022 Yes    Anemia in ESRD (end-stage renal disease) [N18.6, D63.1] 11/18/2022 Yes    Renovascular hypertension [I15.0] 11/18/2022 Yes    HLD (hyperlipidemia) [E78.5] 11/18/2022 Yes    Chronic kidney disease-mineral and bone disorder [N18.9, E83.9, M89.9] 10/12/2022 Yes    Diabetes mellitus with chronic kidney disease on chronic dialysis [E10.22, N18.6, Z99.2] 10/12/2022 Not Applicable    Acute on chronic diastolic heart failure [I50.33] 10/12/2022 Yes    Other emphysema [J43.8] 10/12/2022 Yes    ESRD on dialysis [N18.6, Z99.2]  Not Applicable      Problems Resolved During this Admission:       Medications:  Reconciled Home Medications:      Medication List      Change how you take these medications    insulin aspart U-100 100 unit/mL (3 mL) Inpn pen  Commonly known as: NovoLOG  Inject 5 Units into the skin 3 (three) times daily with meals.  What changed:   · how much to take  · Another medication with the same name was removed. Continue taking this medication, and follow the directions you see here.     insulin detemir  "U-100 100 unit/mL (3 mL) Inpn pen  Commonly known as: Levemir FLEXTOUCH  Inject 12 Units into the skin once daily.  What changed:   · how much to take  · when to take this        Continue taking these medications    acetaminophen 650 MG Tbsr  Commonly known as: TYLENOL  Take 650 mg by mouth every 8 (eight) hours as needed (pain or fever over 100.4).     albuterol 90 mcg/actuation inhaler  Commonly known as: PROVENTIL/VENTOLIN HFA  Inhale 2 puffs into the lungs every 6 (six) hours as needed for Wheezing or Shortness of Breath.     apixaban 5 mg Tab  Commonly known as: ELIQUIS  Take 1 tablet (5 mg total) by mouth 2 (two) times daily. Start this dose AFTER you have completed the 7 days of the 10 mg dose.     aspirin 81 MG EC tablet  Commonly known as: ECOTRIN  Take 81 mg by mouth once daily.     atorvastatin 40 MG tablet  Commonly known as: LIPITOR  Take 1 tablet (40 mg total) by mouth once daily.     BD ULTRA-FINE RODRÍGUEZ PEN NEEDLE 32 gauge x 5/32" Ndle  Generic drug: pen needle, diabetic  Use to inject 1 each into the skin 5 (five) times daily.     carvediloL 3.125 MG tablet  Commonly known as: COREG  Take 2 tablets (6.25 mg total) by mouth 2 (two) times daily.     cetirizine 10 MG tablet  Commonly known as: ZYRTEC  Take 10 mg by mouth daily as needed (itching).     cloNIDine 0.3 mg/24 hr td ptwk 0.3 mg/24 hr  Commonly known as: CATAPRES  Place 1 patch onto the skin every Saturday.     FLUoxetine 40 MG capsule  Take 40 mg by mouth once daily.     fluticasone-salmeterol 250-50 mcg/dose 250-50 mcg/dose diskus inhaler  Commonly known as: ADVAIR  Inhale 1 puff into the lungs 2 (two) times daily.     GLUCAGON EMERGENCY KIT (HUMAN) 1 mg Solr  Generic drug: glucagon  1 mg into the muscle as needed if CBG < 70     hydrALAZINE 100 MG tablet  Commonly known as: APRESOLINE  Take 1 tablet (100 mg total) by mouth every 8 (eight) hours.     HYDROPHILIC CREAM TOP  Apply topically. Apply liberal amount to affected area twice daily for " dry skin     isosorbide mononitrate 30 MG 24 hr tablet  Commonly known as: IMDUR  Take 30 mg by mouth 2 (two) times daily.     melatonin 3 mg Tbdl  Take 9 mg by mouth nightly as needed (sleep).     NIFEdipine 90 MG (OSM) 24 hr tablet  Commonly known as: PROCARDIA-XL  Take 1 tablet (90 mg total) by mouth once daily.     sodium chloride 0.65 % nasal spray  Commonly known as: OCEAN  2 sprays by Nasal route every 4 (four) hours as needed for Congestion.     tiotropium bromide 2.5 mcg/actuation inhaler  Commonly known as: SPIRIVA RESPIMAT  Inhale 2 puffs into the lungs Daily.     triamcinolone acetonide 0.025 % Lotn  Apply topically. Apply to the affected area twice daily     vitamin renal formula (B-complex-vitamin c-folic acid) 1 mg Cap  Commonly known as: NEPHROCAP  Take 1 capsule by mouth once daily.              Discharged Condition: fair    Disposition: Nursing Facility          Follow Up:     Patient Instructions:   No discharge procedures on file.    Laboratory/Diagnostic Data:    CBC/Anemia Labs: Coags:    Recent Labs   Lab 12/20/22 0438 12/21/22 0431 12/22/22 0438   WBC 3.66* 3.85* 3.45*   HGB 10.3* 10.1* 9.7*   HCT 34.1* 31.8* 31.7*    214 172   MCV 92 90 93   RDW 17.1* 17.0* 17.2*    Recent Labs   Lab 12/19/22 2137   INR 1.4*   APTT 49.7*        Chemistries: ABG:   Recent Labs   Lab 12/19/22  1356 12/19/22 2137 12/20/22 0438 12/21/22  0431 12/22/22 0438   * 132* 133* 135* 134*   K 5.9* 3.2* 3.5 3.6 4.0   CL 94* 95 97 97 101   CO2 21* 25 23 27 23   BUN 41* 30* 33* 44* 29*   CREATININE 5.5* 4.3* 4.9* 5.8* 4.7*   CALCIUM 7.6* 7.6* 7.9* 8.0* 8.1*   PROT 6.6  --   --   --   --    BILITOT 0.7  --   --   --   --    ALKPHOS 200*  --   --   --   --    ALT 35  --   --   --   --    AST 91*  --   --   --   --    MG  --   --  2.2 2.3 2.3   PHOS  --  2.5*  --   --   --     Recent Labs   Lab 12/21/22  0009   PH 7.401   PCO2 46.9*   PO2 32*   HCO3 29.1*   POCSATURATED 60*   BE 4        POCT Glucose:  HbA1c:    Recent Labs   Lab 12/21/22  0638 12/21/22  0733 12/21/22  1052 12/21/22  1709 12/21/22  2215 12/22/22  1205   POCTGLUCOSE 39* 91 99 185* 226* 226*    Hemoglobin A1C   Date Value Ref Range Status   12/20/2022 7.5 (H) 4.0 - 5.6 % Final     Comment:     ADA Screening Guidelines:  5.7-6.4%  Consistent with prediabetes  >or=6.5%  Consistent with diabetes    High levels of fetal hemoglobin interfere with the HbA1C  assay. Heterozygous hemoglobin variants (HbS, HgC, etc)do  not significantly interfere with this assay.   However, presence of multiple variants may affect accuracy.     10/12/2022 11.9 (H) 4.0 - 5.6 % Final     Comment:     ADA Screening Guidelines:  5.7-6.4%  Consistent with prediabetes  >or=6.5%  Consistent with diabetes    High levels of fetal hemoglobin interfere with the HbA1C  assay. Heterozygous hemoglobin variants (HbS, HgC, etc)do  not significantly interfere with this assay.   However, presence of multiple variants may affect accuracy.     08/15/2009 8.9 (H) 4.0 - 6.2 % Final        Cardiac Enzymes: Ejection Fractions:    No results for input(s): CPK, CPKMB, MB, TROPONINI in the last 72 hours. EF   Date Value Ref Range Status   11/23/2022 63 % Final   10/12/2022 40 % Final          No results for input(s): COLORU, APPEARANCEUA, PHUR, SPECGRAV, PROTEINUA, GLUCUA, KETONESU, BILIRUBINUA, OCCULTUA, NITRITE, UROBILINOGEN, LEUKOCYTESUR, RBCUA, WBCUA, BACTERIA, SQUAMEPITHEL, HYALINECASTS in the last 48 hours.    Invalid input(s): WRIGHTSUR    Procalcitonin (ng/mL)   Date Value   10/12/2022 1.87 (H)     Lactate (Lactic Acid) (mmol/L)   Date Value   10/12/2022 2.0     BNP (pg/mL)   Date Value   12/19/2022 1,701 (H)   11/23/2022 3,024 (H)   11/18/2022 2,255 (H)   10/12/2022 2,772 (H)     CRP (mg/L)   Date Value   10/13/2022 44.2 (H)   10/11/2022 73.4 (H)     Sed Rate (mm/Hr)   Date Value   10/11/2022 55 (H)     D-Dimer (mg/L FEU)   Date Value   10/12/2022 3.05 (H)     No results found for:  FERRITIN  No results found for: LDH  Troponin I (ng/mL)   Date Value   11/18/2022 0.035 (H)   10/12/2022 0.030 (H)   10/12/2022 0.033 (H)     CPK (U/L)   Date Value   10/13/2022 134     No results found for this or any previous visit.  SARS-CoV2 (COVID-19) Qualitative PCR (no units)   Date Value   11/18/2022 Not Detected       Microbiology labs for the last week  Microbiology Results (last 7 days)     ** No results found for the last 168 hours. **            Reviewed and noted in plan where applicable- Please see chart for full lab data.    Lines/Drains:       Peripheral IV - Single Lumen 12/19/22 1417 20 G Anterior;Distal;Right Upper Arm (Active)   Site Assessment No redness;No swelling;Clean;Dry;Intact 12/22/22 0800   Extremity Assessment Distal to IV No abnormal discoloration 12/22/22 0800   Line Status Flushed 12/22/22 0800   Dressing Status Dry;Old drainage 12/22/22 0400   Dressing Intervention Integrity maintained 12/22/22 0400   Dressing Change Due 12/23/22 12/20/22 0400   Site Change Due 12/23/22 12/20/22 0143   Reason Not Rotated Not due 12/20/22 0400   Number of days: 2            Hemodialysis AV Fistula Left upper arm (Active)   Needle Size 15ga 12/21/22 0820   Site Assessment Clean;Dry;Intact 12/22/22 0800   Patency Present;Thrill;Bruit 12/22/22 0800   Status Deaccessed 12/22/22 0800   Flows Good 12/21/22 0820   Dressing Intervention Integrity maintained 12/22/22 0800   Dressing Status Clean;Dry;Intact 12/22/22 0800   Site Condition No complications 12/21/22 2000   Dressing Gauze 12/21/22 2000   Number of days:        Imaging  ECG Results          EKG 12-lead (Final result)  Result time 12/20/22 01:21:36    Final result by Interface, Lab In [facility] (12/20/22 01:21:36)             Narrative:    Test Reason : R06.02,    Vent. Rate : 071 BPM     Atrial Rate : 071 BPM     P-R Int : 232 ms          QRS Dur : 102 ms      QT Int : 502 ms       P-R-T Axes : 051 -01 073 degrees     QTc Int : 545 ms    Normal  sinus rhythm with 1st degree A-V block  Low voltage, limb leads  Abnormal R wave progression  Prolonged QT  Abnormal ECG  When compared with ECG of 18-NOV-2022 13:39,  The axis Shifted left  Confirmed by Lisa LYLES, Jair ([id number]) on 12/20/2022 1:21:28 AM    Referred By: RUTHERR   SELF           Confirmed By:Jair Gonzalez MD                            Results for orders placed during the hospital encounter of 11/18/22    Echo    Interpretation Summary  · The left ventricle is normal in size with mild concentric hypertrophy and increased density and normal systolic function.  · The estimated ejection fraction is 63%.  · Grade II left ventricular diastolic dysfunction.  · Severe left atrial enlargement.  · Mild right ventricular enlargement with low normal right ventricular systolic function.  · There is right ventricular hypertrophy.  · Moderate right atrial enlargement.  · Mild to moderate tricuspid regurgitation.  · Mild pulmonic regurgitation.  · Elevated central venous pressure (15 mmHg).  · The estimated PA systolic pressure is 67 mmHg.  · There is moderate-severe pulmonary hypertension.  · Trivial posterolateral pericardial effusion.  · There is a moderate left pleural effusion.  · There may be some ascites present.      X-Ray Chest AP Portable  Narrative: EXAMINATION:  XR CHEST AP PORTABLE    CLINICAL HISTORY:  acute worsening SOB and hypoxia;    TECHNIQUE:  Single frontal view of the chest was performed.    COMPARISON:  12/19/2022, 11/25/2022, 11/23/2022 chest x-ray    FINDINGS:  There is right perihilar and lung base mildly increased right airspace opacification.  Band of atelectasis or fluid along the major fissure is again noted on the right.  There is no acute significant costophrenic angle blunting bilaterally.  Cardiac silhouette is prominent and stable with some hilar congestive changes seen.  Impression: Slightly increased airspace opacities in the right perihilar region as described.  No other acute  interval detrimental changes.    Electronically signed by: Rhina Duenas  Date:    12/21/2022  Time:    02:08      Imaging:  Imaging Results          X-Ray Chest AP Portable (Final result)  Result time 12/19/22 14:13:09    Final result by Jasson Simmons MD (12/19/22 14:13:09)             Impression:      As above      Electronically signed by: Jasson Simmons  Date:    12/19/2022  Time:    14:13           Narrative:    EXAMINATION:  XR CHEST AP PORTABLE    CLINICAL HISTORY:  Shortness of breath    TECHNIQUE:  Single frontal view of the chest was performed.    COMPARISON:  November 25, 2022    FINDINGS:  Reconfirmed enlargement of cardiopericardial silhouette and central pulmonary vascular congestion.  Reconfirmed scattered the extensive bilateral interstitial and some subtle ground-glass opacities that could be on the basis of edema and or infection.  Clinical correlation.  Some increasing density about the right mid lung zone-minor fissure that could relate to progressive atelectasis, layered fluid, or infiltrate.  No obvious pleural fluid blunting right or left costophrenic sulci and no right or left pneumothorax.                                Follow Up Instructions:     No future appointments.    Discharge Instructions:  No discharge procedures on file.    Jonathan Marie MD  Attending Staff Physician  [facility] Medicine

## 2022-12-22 NOTE — PLAN OF CARE
Brief reassessment of patient to confirm discharge plans have not changed or document changes.  No changes to Pt's discharge plan or returning to [facility]. Pt was previously on O2 and has all necessary HME.   SW also spoke to Pt's mother, Kassandra Lewis, as well as [facility] admissions to inform of Pt's progress and plan to return to NH today or tomorrow morning. [facility] Admissions Director stated that orders to return must be in prior to 4 PM.  relayed information directly to Dr. Marie.  Pt will be transported by [facility] wheelchair van or [facility] at discharge.      Ivory Nur, [facility]  [place] Department  [email or web address]    Nick Menjivar - Cardiology Stepdown  Discharge Reassessment    Primary Care Provider: Primary Doctor No    Expected Discharge Date: 12/23/2022    Reassessment (most recent)       Discharge Reassessment - 12/22/22 1243          Discharge Reassessment    Assessment Type Discharge Planning Reassessment     Did the patient's condition or plan change since previous assessment? No     Discharge Plan discussed with: Patient;Parent(s)     Name(s) and Number(s) Kassandra Lewis [phone]     Communicated GERA with patient/caregiver Yes     Discharge Plan A Return to nursing home     Discharge Plan B Return to Nursing Home     DME Needed Upon Discharge  none     Discharge Barriers Identified None        Post-Acute Status    Post-Acute Authorization Placement     Post-Acute Placement Status Set-up Complete/Auth obtained     Hospital Resources/Appts/Education Provided Provided patient/caregiver with written discharge plan information;Appointments scheduled and added to AVS;Appointments scheduled by Navigator/Coordinator     Discharge Delays None known at this time

## 2022-12-22 NOTE — NURSING
Second attempt to call report to [facility] with no success. [facility] transport here for transport.  Bhumika Espino RN  12/22/2022

## 2022-12-22 NOTE — PLAN OF CARE
Problem: Adult Inpatient Plan of Care  Goal: Plan of Care Review  Outcome: Ongoing, Progressing  Flowsheets (Taken 12/21/2022 1822)  Plan of Care Reviewed With: patient     Problem: Adult Inpatient Plan of Care  Goal: Absence of Hospital-Acquired Illness or Injury  Intervention: Prevent Skin Injury  Flowsheets (Taken 12/21/2022 1822)  Body Position: side-lying  Skin Protection: adhesive use limited     Problem: Adult Inpatient Plan of Care  Goal: Absence of Hospital-Acquired Illness or Injury  Intervention: Prevent and Manage VTE (Venous Thromboembolism) Risk  Flowsheets (Taken 12/21/2022 1822)  Activity Management: Rolling - L1  VTE Prevention/Management: remove, assess skin, and reapply sequential compression device  Range of Motion: active ROM (range of motion) encouraged     Problem: Adult Inpatient Plan of Care  Goal: Absence of Hospital-Acquired Illness or Injury  Intervention: Prevent Infection  Flowsheets (Taken 12/21/2022 1822)  Infection Prevention:   hand hygiene promoted   rest/sleep promoted     Problem: Adult Inpatient Plan of Care  Goal: Optimal Comfort and Wellbeing  Intervention: Monitor Pain and Promote Comfort  Flowsheets (Taken 12/21/2022 1822)  Pain Management Interventions:   medication offered   relaxation techniques promoted     Problem: Adult Inpatient Plan of Care  Goal: Optimal Comfort and Wellbeing  Intervention: Provide Person-Centered Care  Flowsheets (Taken 12/21/2022 1822)  Trust Relationship/Rapport:   care explained   emotional support provided     Problem: Fall Injury Risk  Goal: Absence of Fall and Fall-Related Injury  Intervention: Identify and Manage Contributors  Flowsheets (Taken 12/21/2022 1822)  Self-Care Promotion: independence encouraged  Medication Review/Management:   medications reviewed   high-risk medications identified

## 2022-12-22 NOTE — ASSESSMENT & PLAN NOTE
Patient's FSGs are controlled on current medication regimen.  Last A1c reviewed-   Lab Results   Component Value Date    HGBA1C 7.5 (H) 12/20/2022     Most recent fingerstick glucose reviewed-   Recent Labs   Lab 12/21/22  0733 12/21/22  1052 12/21/22  1709 12/21/22  2215   POCTGLUCOSE 91 99 185* 226*      anti-hyperglycemic dose as follows-   Antihyperglycemics (From admission, onward)    Start     Stop Route Frequency Ordered    12/22/22 0745  insulin aspart U-100 pen 4 Units         -- SubQ 3 times daily with meals 12/21/22 1712    12/21/22 0915  insulin detemir U-100 pen 5 Units         -- SubQ 2 times daily 12/21/22 0901    12/19/22 1724  insulin aspart U-100 pen 0-5 Units         -- SubQ Before meals & nightly PRN 12/19/22 1627         12/22   . endocrine following for labs consistent with mild DKA, now resolved. Hypoglycemia of 30s on 12/221 Decreased Levemir to 5 units BID and  Novolog to 5 units TIDAC.

## 2022-12-22 NOTE — PROGRESS NOTES
"Nick Menjivar - Cardiology [facility] Medicine  Progress Note    Patient Name: Cosme Lewis  MRN: [id number]  Patient Class: IP- Inpatient   Admission Date: 12/19/2022  Length of Stay: 2 days  Attending Physician: Jonathan Marie MD  Primary Care Provider: Primary Doctor No        Subjective:     Principal Problem:Acute on chronic respiratory failure with hypoxia        HPI:  Cosme Lewis is a 58 yo male with ESRD on HD MWF, hx of VTE on eliquis, HFpEF, COPD, diabetes, and hypertension, who presents with intermittent nosebleeding for the last 3 days. Bleeding currently controlled, he has been taking his eliquis. He also c/o SOB that he noticed today with increased LE swelling. He reports he missed his dialysis session today, last session Friday. He denies fevers or chills, cough, chest pain, palpitations, confusion, lethargy, headache, urinary symptoms, NVD.     In the ED: P 70, /92max. RR16-20. Satting 96% on 4L NC. Afebrile. WBC 3.7K. H/H 10.4/34. . Na 129 (Corrected sodium 135). Glucose 499. K 5.9 (with visible hemolysis). BUN 41/ Cr 5.5. BNP 1701.   CXR: "Reconfirmed enlargement of cardiopericardial silhouette and central pulmonary vascular congestion.  Reconfirmed scattered the extensive bilateral interstitial and some subtle ground-glass opacities that could be on the basis of edema and or infection.  Clinical correlation.  Some increasing density about the right mid lung zone-minor fissure that could relate to progressive atelectasis, layered fluid, or infiltrate.  No obvious pleural fluid blunting right or left costophrenic sulci and no right or left pneumothorax." Given 8U insulin.       Overview/Hospital Course:  12/22  H/o  T1DM, ESRD on HD (MWF), HTN, HFpEF, HLD, chronic hypoxemic resp failure 2/2 COPD (on home O2 - 5L), h/o DVT/PE on Eliquis transported by EMS from [facility] due to uncontrolled nose bleeding.  Episode of nose bleed resolved.  continue HD MWF. endocrine " following for labs consistent with mild DKA, now resolved. Hypoglycemia of 30s on 12/221 Decreased Levemir to 5 units BID and  Novolog to 5 units TIDAC. sats 94% on 2LNC. continue eliquis for H/O DVT . [facility]        Review of Systems:   Pain scale:    Constitutional:  fever,  chills, headache, vision loss, hearing loss, weight loss, Generalized weakness, falls, loss of smell, loss of taste, poor appetite,  sore throat  Respiratory: cough, shortness of breath.   Cardiovascular: chest pain, dizziness, palpitations, orthopnea, swelling of feet, syncope  Gastrointestinal: nausea, vomiting, abdominal pain, diarrhea, black stool,  blood in stool, change in bowel habits  Genitourinary: hematuria, dysuria, urgency, frequency  Integument/Breast: rash,  pruritis  Hematologic/Lymphatic: easy bruising, lymphadenopathy  Musculoskeletal: arthralgias , myalgias, back pain, neck pain, knee pain  Neurological: confusion, seizures, tremors, slurred speech  Behavioral/Psych:  depression, anxiety, auditory or visual hallucinations     OBJECTIVE:     Physical Exam:  Body mass index is 25.39 kg/m².    Constitutional: Appears well-developed and well-nourished.   Head: Normocephalic and atraumatic.   Neck: Normal range of motion. Neck supple.   Cardiovascular: Normal heart rate.  Regular heart rhythm.  Pulmonary/Chest: Effort normal.   Abdominal:+  distension.  No tenderness  Musculoskeletal: Normal range of motion. No edema.   Neurological: Alert and oriented to person, place, and time.   Skin: Skin is warm and dry. bruises   Psychiatric: Normal mood and affect. Behavior is normal.                  Vital Signs  Temp: 97.3 °F (36.3 °C) (12/22/22 1209)  Pulse: 71 (12/22/22 1209)  Resp: 17 (12/22/22 1209)  BP: (Abnormal) 180/89 (12/22/22 1209)  SpO2: (Abnormal) 91 % (12/22/22 1209)     24 Hour VS Range    Temp:  [97.3 °F (36.3 °C)-98.1 °F (36.7 °C)]   Pulse:  [67-80]   Resp:  [17-20]   BP: (142-192)/(67-89)   SpO2:   [91 %-100 %]     Intake/Output Summary (Last 24 hours) at 12/22/2022 1245  Last data filed at 12/22/2022 0918  Gross per 24 hour   Intake 790 ml   Output 0 ml   Net 790 ml         I/O This Shift:  I/O this shift:  In: 120 [P.O.:120]  Out: -     Wt Readings from Last 3 Encounters:   12/20/22 78 kg (171 lb 15.3 oz)   11/23/22 84.8 kg (187 lb)   10/12/22 93 kg (205 lb)       I have personally reviewed the vitals and recorded Intake/Output     Laboratory/Diagnostic Data:    CBC/Anemia Labs: Coags:    Recent Labs   Lab 12/20/22 0438 12/21/22 0431 12/22/22 0438   WBC 3.66* 3.85* 3.45*   HGB 10.3* 10.1* 9.7*   HCT 34.1* 31.8* 31.7*    214 172   MCV 92 90 93   RDW 17.1* 17.0* 17.2*    Recent Labs   Lab 12/19/22 2137   INR 1.4*   APTT 49.7*        Chemistries: ABG:   Recent Labs   Lab 12/19/22  1356 12/19/22 2137 12/20/22  0438 12/21/22 0431 12/22/22 0438   * 132* 133* 135* 134*   K 5.9* 3.2* 3.5 3.6 4.0   CL 94* 95 97 97 101   CO2 21* 25 23 27 23   BUN 41* 30* 33* 44* 29*   CREATININE 5.5* 4.3* 4.9* 5.8* 4.7*   CALCIUM 7.6* 7.6* 7.9* 8.0* 8.1*   PROT 6.6  --   --   --   --    BILITOT 0.7  --   --   --   --    ALKPHOS 200*  --   --   --   --    ALT 35  --   --   --   --    AST 91*  --   --   --   --    MG  --   --  2.2 2.3 2.3   PHOS  --  2.5*  --   --   --     Recent Labs   Lab 12/21/22  0009   PH 7.401   PCO2 46.9*   PO2 32*   HCO3 29.1*   POCSATURATED 60*   BE 4        POCT Glucose: HbA1c:    Recent Labs   Lab 12/21/22  0638 12/21/22  0733 12/21/22  1052 12/21/22  1709 12/21/22  2215 12/22/22  1205   POCTGLUCOSE 39* 91 99 185* 226* 226*    Hemoglobin A1C   Date Value Ref Range Status   12/20/2022 7.5 (H) 4.0 - 5.6 % Final     Comment:     ADA Screening Guidelines:  5.7-6.4%  Consistent with prediabetes  >or=6.5%  Consistent with diabetes    High levels of fetal hemoglobin interfere with the HbA1C  assay. Heterozygous hemoglobin variants (HbS, HgC, etc)do  not significantly interfere with this assay.    However, presence of multiple variants may affect accuracy.     10/12/2022 11.9 (H) 4.0 - 5.6 % Final     Comment:     ADA Screening Guidelines:  5.7-6.4%  Consistent with prediabetes  >or=6.5%  Consistent with diabetes    High levels of fetal hemoglobin interfere with the HbA1C  assay. Heterozygous hemoglobin variants (HbS, HgC, etc)do  not significantly interfere with this assay.   However, presence of multiple variants may affect accuracy.     08/15/2009 8.9 (H) 4.0 - 6.2 % Final        Cardiac Enzymes: Ejection Fractions:    No results for input(s): CPK, CPKMB, MB, TROPONINI in the last 72 hours. EF   Date Value Ref Range Status   11/23/2022 63 % Final   10/12/2022 40 % Final          No results for input(s): COLORU, APPEARANCEUA, PHUR, SPECGRAV, PROTEINUA, GLUCUA, KETONESU, BILIRUBINUA, OCCULTUA, NITRITE, UROBILINOGEN, LEUKOCYTESUR, RBCUA, WBCUA, BACTERIA, SQUAMEPITHEL, HYALINECASTS in the last 48 hours.    Invalid input(s): WRIGHTSUR    Procalcitonin (ng/mL)   Date Value   10/12/2022 1.87 (H)     Lactate (Lactic Acid) (mmol/L)   Date Value   10/12/2022 2.0     BNP (pg/mL)   Date Value   12/19/2022 1,701 (H)   11/23/2022 3,024 (H)   11/18/2022 2,255 (H)   10/12/2022 2,772 (H)     CRP (mg/L)   Date Value   10/13/2022 44.2 (H)   10/11/2022 73.4 (H)     Sed Rate (mm/Hr)   Date Value   10/11/2022 55 (H)     D-Dimer (mg/L FEU)   Date Value   10/12/2022 3.05 (H)     No results found for: FERRITIN  No results found for: LDH  Troponin I (ng/mL)   Date Value   11/18/2022 0.035 (H)   10/12/2022 0.030 (H)   10/12/2022 0.033 (H)     CPK (U/L)   Date Value   10/13/2022 134     No results found for this or any previous visit.  SARS-CoV2 (COVID-19) Qualitative PCR (no units)   Date Value   11/18/2022 Not Detected       Microbiology labs for the last week  Microbiology Results (last 7 days)       ** No results found for the last 168 hours. **            Reviewed and noted in plan where applicable- Please see chart for full  lab data.    Lines/Drains:       Peripheral IV - Single Lumen 12/19/22 1417 20 G Anterior;Distal;Right Upper Arm (Active)   Site Assessment Dry 12/22/22 0400   Extremity Assessment Distal to IV No abnormal discoloration 12/21/22 2000   Line Status Saline locked 12/22/22 0400   Dressing Status Dry;Old drainage 12/22/22 0400   Dressing Intervention Integrity maintained 12/22/22 0400   Dressing Change Due 12/23/22 12/20/22 0400   Site Change Due 12/23/22 12/20/22 0143   Reason Not Rotated Not due 12/20/22 0400   Number of days: 2            Hemodialysis AV Fistula Left upper arm (Active)   Needle Size 15ga 12/21/22 0820   Site Assessment Clean;Dry;Intact 12/21/22 1115   Patency Present;Thrill;Bruit 12/21/22 2000   Status Deaccessed 12/21/22 1115   Flows Good 12/21/22 0820   Dressing Intervention Integrity maintained 12/21/22 2000   Dressing Status Clean;Dry;Intact 12/21/22 2000   Site Condition No complications 12/21/22 2000   Dressing Gauze 12/21/22 2000   Number of days:        Imaging  ECG Results              EKG 12-lead (Final result)  Result time 12/20/22 01:21:36      Final result by Interface, Lab In [facility] (12/20/22 01:21:36)               Narrative:    Test Reason : R06.02,    Vent. Rate : 071 BPM     Atrial Rate : 071 BPM     P-R Int : 232 ms          QRS Dur : 102 ms      QT Int : 502 ms       P-R-T Axes : 051 -01 073 degrees     QTc Int : 545 ms    Normal sinus rhythm with 1st degree A-V block  Low voltage, limb leads  Abnormal R wave progression  Prolonged QT  Abnormal ECG  When compared with ECG of 18-NOV-2022 13:39,  The axis Shifted left  Confirmed by Jair Gonzalez MD ([id number]) on 12/20/2022 1:21:28 AM    Referred By: [email or web address]   SELF           Confirmed By:Jair Gonzalez MD                                  Results for orders placed during the hospital encounter of 11/18/22    Echo    Interpretation Summary  · The left ventricle is normal in size with mild concentric hypertrophy and increased density and  normal systolic function.  · The estimated ejection fraction is 63%.  · Grade II left ventricular diastolic dysfunction.  · Severe left atrial enlargement.  · Mild right ventricular enlargement with low normal right ventricular systolic function.  · There is right ventricular hypertrophy.  · Moderate right atrial enlargement.  · Mild to moderate tricuspid regurgitation.  · Mild pulmonic regurgitation.  · Elevated central venous pressure (15 mmHg).  · The estimated PA systolic pressure is 67 mmHg.  · There is moderate-severe pulmonary hypertension.  · Trivial posterolateral pericardial effusion.  · There is a moderate left pleural effusion.  · There may be some ascites present.      X-Ray Chest AP Portable  Narrative: EXAMINATION:  XR CHEST AP PORTABLE    CLINICAL HISTORY:  acute worsening SOB and hypoxia;    TECHNIQUE:  Single frontal view of the chest was performed.    COMPARISON:  12/19/2022, 11/25/2022, 11/23/2022 chest x-ray    FINDINGS:  There is right perihilar and lung base mildly increased right airspace opacification.  Band of atelectasis or fluid along the major fissure is again noted on the right.  There is no acute significant costophrenic angle blunting bilaterally.  Cardiac silhouette is prominent and stable with some hilar congestive changes seen.  Impression: Slightly increased airspace opacities in the right perihilar region as described.  No other acute interval detrimental changes.    Electronically signed by: Rhina Duenas  Date:    12/21/2022  Time:    02:08      Labs, Imaging, EKG and Diagnostic results from 12/22/2022 were reviewed.    Medications:  Medication list was reviewed and changes noted under Assessment/Plan.  No current facility-administered medications on file prior to encounter.     Current Outpatient Medications on File Prior to Encounter   Medication Sig Dispense Refill    acetaminophen (TYLENOL) 650 MG TbSR Take 650 mg by mouth every 8 (eight) hours as needed (pain or fever  "over 100.4).      albuterol (PROVENTIL/VENTOLIN HFA) 90 mcg/actuation inhaler Inhale 2 puffs into the lungs every 6 (six) hours as needed for Wheezing or Shortness of Breath.      apixaban (ELIQUIS) 5 mg Tab Take 1 tablet (5 mg total) by mouth 2 (two) times daily. Start this dose AFTER you have completed the 7 days of the 10 mg dose. 180 tablet 0    aspirin (ECOTRIN) 81 MG EC tablet Take 81 mg by mouth once daily.      atorvastatin (LIPITOR) 40 MG tablet Take 1 tablet (40 mg total) by mouth once daily. 90 tablet 3    carvediloL (COREG) 3.125 MG tablet Take 2 tablets (6.25 mg total) by mouth 2 (two) times daily. 120 tablet 11    cetirizine (ZYRTEC) 10 MG tablet Take 10 mg by mouth daily as needed (itching).      cloNIDine 0.3 mg/24 hr td ptwk (CATAPRES) 0.3 mg/24 hr Place 1 patch onto the skin every Saturday.      FLUoxetine 40 MG capsule Take 40 mg by mouth once daily.      fluticasone-salmeterol diskus inhaler 250-50 mcg Inhale 1 puff into the lungs 2 (two) times daily.      GLUCAGON EMERGENCY KIT, HUMAN, 1 mg injection 1 mg into the muscle as needed if CBG < 70      hydrALAZINE (APRESOLINE) 100 MG tablet Take 1 tablet (100 mg total) by mouth every 8 (eight) hours.      HYDROPHILIC CREAM TOP Apply topically. Apply liberal amount to affected area twice daily for dry skin      isosorbide mononitrate (IMDUR) 30 MG 24 hr tablet Take 30 mg by mouth 2 (two) times daily.      melatonin 3 mg TbDL Take 9 mg by mouth nightly as needed (sleep).      NIFEdipine (PROCARDIA-XL) 90 MG (OSM) 24 hr tablet Take 1 tablet (90 mg total) by mouth once daily. 30 tablet 11    pen needle, diabetic 32 gauge x 5/32" Ndle Use to inject 1 each into the skin 5 (five) times daily. 100 each 0    sodium chloride (OCEAN) 0.65 % nasal spray 2 sprays by Nasal route every 4 (four) hours as needed for Congestion.      tiotropium bromide (SPIRIVA RESPIMAT) 2.5 mcg/actuation inhaler Inhale 2 puffs into the lungs Daily.      triamcinolone acetonide 0.025 " % Lotn Apply topically. Apply to the affected area twice daily      vitamin renal formula, B-complex-vitamin c-folic acid, (NEPHROCAP) 1 mg Cap Take 1 capsule by mouth once daily.      [DISCONTINUED] insulin aspart (NOVOLOG FLEXPEN U-100 INSULIN SUBQ) Inject into the skin before meals and at bedtime per sliding scale: 0-60= OJ or glucose tab; = 0; 201-250= 2; 251-300= 4; 301-350= 6; 351-400= 8; greater than 400= 10 units then call MD      [DISCONTINUED] insulin aspart U-100 (NOVOLOG) 100 unit/mL (3 mL) InPn pen Inject 10 Units into the skin 3 (three) times daily with meals. Max 30 units per day 90 mL 0    [DISCONTINUED] insulin detemir U-100 (LEVEMIR FLEXTOUCH) 100 unit/mL (3 mL) SubQ InPn pen Inject 15 Units into the skin 2 (two) times daily. Once in the morning and once before bedtime. Max 30 units per day 90 mL 0     Scheduled Medications:  apixaban, 5 mg, Oral, BID  aspirin, 81 mg, Oral, Daily  atorvastatin, 40 mg, Oral, Daily  carvediloL, 6.25 mg, Oral, BID  [START ON 12/24/2022] cloNIDine 0.3 mg/24 hr td ptwk, 1 patch, Transdermal, Every Sat  FLUoxetine, 40 mg, Oral, Daily  fluticasone furoate-vilanteroL, 1 puff, Inhalation, Daily  hydrALAZINE, 100 mg, Oral, Q8H  insulin aspart U-100, 6 Units, Subcutaneous, TID WM  insulin detemir U-100, 5 Units, Subcutaneous, BID  isosorbide mononitrate, 30 mg, Oral, BID  mupirocin, , Nasal, BID  NIFEdipine, 90 mg, Oral, Daily  tiotropium bromide, 2 puff, Inhalation, Daily  vitamin renal formula (B-complex-vitamin c-folic acid), 1 capsule, Oral, Daily      PRN: acetaminophen, albuterol-ipratropium, aluminum-magnesium hydroxide-simethicone, cetirizine, dextrose 10%, dextrose 10%, glucagon (human recombinant), glucose, glucose, hydrALAZINE, insulin aspart U-100, melatonin, naloxone, polyethylene glycol, sodium chloride 0.9%, sodium chloride 0.9%  Infusions:   Estimated Creatinine Clearance: 16.9 mL/min (A) (based on SCr of 4.7 mg/dL (H)).    Assessment/Plan:      * Acute  on chronic respiratory failure with hypoxia  Patient with Hypoxic Respiratory failure which is Acute on chronic.  he is on home oxygen at 2 LPM. Supplemental oxygen was provided and noted- 4-5L NC Oxygen Concentration (%):  [40] 40.   Signs/symptoms of respiratory failure include- tachypnea, increased work of breathing and wheezing. Contributing diagnoses includes - CHF and Hypervolemia Labs and images were reviewed. Patient Has not had a recent ABG. Will treat underlying causes and adjust management of respiratory failure as follows-  - Volume management with HD  - Fluid overload from CHF  12/22  sats 94% on 2LNC    Malnutrition of mild degree  -Dietician consult  -Will give supplement      DKA (diabetic ketoacidosis)  Patient's FSGs are controlled on current medication regimen.  Last A1c reviewed-   Lab Results   Component Value Date    HGBA1C 7.5 (H) 12/20/2022     Most recent fingerstick glucose reviewed-   Recent Labs   Lab 12/21/22  0733 12/21/22  1052 12/21/22  1709 12/21/22  2215   POCTGLUCOSE 91 99 185* 226*      anti-hyperglycemic dose as follows-   Antihyperglycemics (From admission, onward)      Start     Stop Route Frequency Ordered    12/22/22 0745  insulin aspart U-100 pen 4 Units         -- SubQ 3 times daily with meals 12/21/22 1712    12/21/22 0915  insulin detemir U-100 pen 5 Units         -- SubQ 2 times daily 12/21/22 0901    12/19/22 1724  insulin aspart U-100 pen 0-5 Units         -- SubQ Before meals & nightly PRN 12/19/22 1627           12/22   . endocrine following for labs consistent with mild DKA, now resolved. Hypoglycemia of 30s on 12/221 Decreased Levemir to 5 units BID and  Novolog to 5 units TIDAC.     Pulmonary hypertension        HLD (hyperlipidemia)  - continue statin      Renovascular hypertension  - continue clonidine, nifedipine, hydralazine, coreg, imdur      Anemia in ESRD (end-stage renal disease)  - stable, at baseline  - nose bleeding controlled   - continue eliquis for  now, monitor daily CBC  12/22      Patient's with Normocytic anemia.. Hemoglobin stable. Etiology likely due to chronic disease .  Current CBC reviewed-  Recent Labs   Lab 12/20/22  0438 12/21/22  0431 12/22/22  0438   HGB 10.3* 10.1* 9.7*     Monitor CBC and transfuse if H/H drops below 7/21.     Acute on chronic diastolic heart failure  Pulmonary hypertension    CHF Pathway initiated  Recent 2D echo with grade 2 diastolic heart failure, preserved EF, and moderate-severe pulmonary hypertension (see below)  BNP >1700 and CXR with pulmonary edema  Defer to nephrology for volume control with HD  Strict I&Os with daily weights. Admit weight 186 lb.   Continue asa, statin, coreg, imdur, BP control    Results for orders placed during the hospital encounter of 11/18/22    Echo    Interpretation Summary  · The left ventricle is normal in size with mild concentric hypertrophy and increased density and normal systolic function.  · The estimated ejection fraction is 63%.  · Grade II left ventricular diastolic dysfunction.  · Severe left atrial enlargement.  · Mild right ventricular enlargement with low normal right ventricular systolic function.  · There is right ventricular hypertrophy.  · Moderate right atrial enlargement.  · Mild to moderate tricuspid regurgitation.  · Mild pulmonic regurgitation.  · Elevated central venous pressure (15 mmHg).  · The estimated PA systolic pressure is 67 mmHg.  · There is moderate-severe pulmonary hypertension.  · Trivial posterolateral pericardial effusion.  · There is a moderate left pleural effusion.  · There may be some ascites present.      Other emphysema  - CXR reviewed  - does not appear to be in COPD exacerbation at this time; presentation more c/w CHF/ volume overload  - continue LAMA and ICS-LABA inhalers      Chronic kidney disease-mineral and bone disorder    as above    Diabetes mellitus with chronic kidney disease on chronic dialysis  Patient's FSGs are uncontrolled due to  hyperglycemia on current medication regimen.  Last A1c reviewed-   Lab Results   Component Value Date    HGBA1C 7.5 (H) 12/20/2022     Most recent fingerstick glucose reviewed-   Recent Labs   Lab 12/21/22  0733 12/21/22  1052 12/21/22  1709 12/21/22  2215   POCTGLUCOSE 91 99 185* 226*     Current correctional scale  Low  Maintain anti-hyperglycemic dose as follows-   Antihyperglycemics (From admission, onward)      Start     Stop Route Frequency Ordered    12/22/22 0745  insulin aspart U-100 pen 4 Units         -- SubQ 3 times daily with meals 12/21/22 1712    12/21/22 0915  insulin detemir U-100 pen 5 Units         -- SubQ 2 times daily 12/21/22 0901    12/19/22 1724  insulin aspart U-100 pen 0-5 Units         -- SubQ Before meals & nightly PRN 12/19/22 1627          Hold Oral hypoglycemics while patient is in the hospital.  - beta-hydroxybutyrate pending  - UA ordered  - endocrinology consulted for A1C 7.5  12/22 endocrine following for labs consistent with mild DKA, now resolved. Hypoglycemia of 30s on 12/221 Decreased Levemir to 5 units BID and  Novolog to 5 units TIDAC.      ESRD on dialysis  CKD mineral and bone disorder    Creatine and BUN elevated from baseline on admit. BMP reviewed- noted Estimated Creatinine Clearance: 16.9 mL/min (A) (based on SCr of 4.7 mg/dL (H)). according to latest data. Monitor UOP and serial BMP and adjust therapy as needed. Renally dose meds.  - trend renal function panel   - continue daily renal vitamin  - Nephrology consulted for HD assistance            VTE Risk Mitigation (From admission, onward)           Ordered     apixaban tablet 5 mg  2 times daily         12/19/22 1627     IP VTE HIGH RISK PATIENT  Once         12/19/22 1627     Place sequential compression device  Until discontinued         12/19/22 1627                    Discharge Planning   GERA: 12/23/2022     Code Status: Full Code   Is the patient medically ready for discharge?: No    Reason for patient still in  hospital (select all that apply): Treatment  Discharge Plan A: (Pended) Return to nursing home   Discharge Delays: (Pended) None known at this time              Jonathan Marie MD  Department of Hospital Medicine   [facility] - Cardiology Stepdown

## 2022-12-22 NOTE — PROGRESS NOTES
"[facility] - Cardiology Stepdown  Endocrinology  Progress Note    Admit Date: 2022     Mr. Cosme Lewis is a 57 year old male with medical history significant for hypertension, COPD, diabetes mellitus complicated by ESRD on HD MWF.  He presented to the hospital from his nursing home due to concerns for a nose bleed and acute on chronic respiratory failure.  He was found to have blood sugar greater than 500 with elevated beta hydroxybutyrate, low bicarb and anion gap of 14.  He met criteria, although somewhat mild and was placed back on his outpatient MDI insulin regimen.  Endocrine was then consulted for further assistance in glucose management    Mr. Nowak is a poor historian but states that he was diagnosed with diabetes mellitus around 3 years ago although labs suggest he has been a diabetic since at least .  Uncertain if type 1 or type 2 based on chart review.  He is uncertain of his insulin regimen at nursing facility but he knows he is on both prandial and basal insulin.  He reports that he has had some low blood sugars at the NH.  He admits that his ESRD is a complication of his DM.  Family history includes his mother who was diagnosed with DM.        Outpatient Regimen (Per chart review):  Levemir 15 units BID  Novolog 10 units TIDAC  Low dose sliding scale insulin    Lab Results   Component Value Date    HGBA1C 7.5 (H) 2022             Interval HPI:   Overnight events: No low blood glucose overnight.  No new overnight endocrine issues.    Eatin%  Nausea: No  Hypoglycemia and intervention: No  Fever: No  TPN and/or TF: No  If yes, type of TF/TPN and rate: N/A    BP (!) 164/82 (Patient Position: Lying)   Pulse 78   Temp 98.1 °F (36.7 °C)   Resp 20   Ht 5' 9" (1.753 m)   Wt 78 kg (171 lb 15.3 oz)   SpO2 95%   BMI 25.39 kg/m²     Labs Reviewed and Include    Recent Labs   Lab 22  0438   *   CALCIUM 8.1*   *   K 4.0   CO2 23      BUN 29*   CREATININE 4.7* "     Lab Results   Component Value Date    WBC 3.45 (L) 12/22/2022    HGB 9.7 (L) 12/22/2022    HCT 31.7 (L) 12/22/2022    MCV 93 12/22/2022     12/22/2022     No results for input(s): TSH, FREET4 in the last 168 hours.  Lab Results   Component Value Date    HGBA1C 7.5 (H) 12/20/2022       Nutritional status:   Body mass index is 25.39 kg/m².  Lab Results   Component Value Date    ALBUMIN 2.5 (L) 12/19/2022    ALBUMIN 2.7 (L) 12/19/2022    ALBUMIN 2.5 (L) 11/27/2022     No results found for: PREALBUMIN    Estimated Creatinine Clearance: 16.9 mL/min (A) (based on SCr of 4.7 mg/dL (H)).    Accu-Checks  Recent Labs     12/20/22  0815 12/20/22  1206 12/20/22  1640 12/20/22  2129 12/21/22  0631 12/21/22  0638 12/21/22  0733 12/21/22  1052 12/21/22  1709 12/21/22  2215   POCTGLUCOSE 88 97 163* 148* 42* 39* 91 99 185* 226*       Current Medications and/or Treatments Impacting Glycemic Control  Immunotherapy:    Immunosuppressants       None          Steroids:   Hormones (From admission, onward)      Start     Stop Route Frequency Ordered    12/19/22 1724  melatonin tablet 9 mg         -- Oral Nightly PRN 12/19/22 1627          Pressors:    Autonomic Drugs (From admission, onward)      None          Hyperglycemia/Diabetes Medications:   Antihyperglycemics (From admission, onward)      Start     Stop Route Frequency Ordered    12/22/22 1200  insulin aspart U-100 pen 6 Units         -- SubQ 3 times daily with meals 12/22/22 0958    12/21/22 0915  insulin detemir U-100 pen 5 Units         -- SubQ 2 times daily 12/21/22 0901    12/19/22 1724  insulin aspart U-100 pen 0-5 Units         -- SubQ Before meals & nightly PRN 12/19/22 1627            ASSESSMENT and PLAN    * Acute on chronic respiratory failure with hypoxia  - Presenting complication, now on nasal canula  - If a component of COPD is thought to be involved then we would need to know if steroids will be needed as insulin needs would likely change    Diabetes  mellitus with chronic kidney disease on chronic dialysis  - Unclear if type 1 or type 2, but did present with labs consistent with mild DKA, now resolved  - Based on reports of low blood sugars at NH and further downtrend of glucose overnight with lows early morning we will adjust insulin regimen as below  - A1c reviewed which is 7.5 %, near goal of 7%    Plan:  - Increase Levemir to 6 units BID  - Continue Novolog 5 units TIDAC  - Continue Low dose sliding scale insulin regimen      **Discharge Recs:  Continue regimen as above.  If switched to Lantus, Tresiba or other long acting insulin besides Levemir then this can be given as a single dose (12 units).  Glucose can be monitored with physician at nursing facility for further insulin adjustments from there.     ESRD on dialysis  - Known ESRD on HD MWF  - Recent missed dialysis appointment  - Carefull of insulin stacking in ESRD, novolog doses should remain spaced every 4 hours          Cole Robles, DO  Endocrinology

## 2022-12-28 NOTE — PHYSICIAN QUERY
PT Name: Cosme Lewis  MR #: [id number]     DOCUMENTATION CLARIFICATION      CDS/: Nona Clifford RN           Contact information: [email or web address]   This form is a permanent document in the medical record.     Query Date: December 28, 2022    By submitting this query, we are merely seeking further clarification of documentation.  Please utilize your independent clinical judgment when addressing the question(s) below.     The Medical Record contains the following:    Clinical Information Location in Medical Record   Nose bleed overnight. On eliquis.    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   + epistaxis    h/o DVT/PE on Eliquis transported by EMS from [facility] due to uncontrolled nose bleeding     presents with intermittent nosebleeding for the last 3 days. Bleeding currently controlled, he has been taking his eliquis      Home med list:  Eliquis 5 mg tab 2 times daily     Eliquis 5 mg oral 2 times daily        12/19/22    Protime 13.9 (H)   INR 1.4 (H)   aPTT 49.7 (H)    ED Provider Note of 12/19         Nephro Consult of 12/19       H & P of 12/19         H & P of 12/19       MAR 12/19 to 12/22         Labs of 12/19      Please document your best medical opinion regarding the etiology of Epistaxis ?        [   ] Due to anticoagulant therapy      [   ] Other etiology (please specify):___________________     [  ] Clinically Undetermined           Please document in your progress notes daily for the duration of treatment, until resolved, and include in your discharge summary.                                                                                 New order placed for anesthesia clearance for surgery

## 2022-12-29 NOTE — PHYSICIAN QUERY
PT Name: Cosme Lewis  MR #: [id number]     DOCUMENTATION CLARIFICATION      CDS/: Nona Clifford RN           Contact information: [email or web address]   This form is a permanent document in the medical record.     Query Date: December 29, 2022    By submitting this query, we are merely seeking further clarification of documentation.  Please utilize your independent clinical judgment when addressing the question(s) below.     The Medical Record contains the following:    Clinical Information Location in Medical Record   Nose bleed overnight. On eliquis.    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   + epistaxis    h/o DVT/PE on Eliquis transported by EMS from [facility] due to uncontrolled nose bleeding     presents with intermittent nosebleeding for the last 3 days. Bleeding currently controlled, he has been taking his eliquis      Home med list:  Eliquis 5 mg tab 2 times daily     Eliquis 5 mg oral 2 times daily        12/19/22    Protime 13.9 (H)   INR 1.4 (H)   aPTT 49.7 (H)    ED Provider Note of 12/19         Nephro Consult of 12/19       H & P of 12/19         H & P of 12/19       MAR 12/19 to 12/22         Labs of 12/19      Please document your best medical opinion regarding the etiology of Epistaxis ?        [   ] Due to anticoagulant therapy      [   ] Other etiology (please specify):___________________     [ x ] Clinically Undetermined           Please document in your progress notes daily for the duration of treatment, until resolved, and include in your discharge summary.

## 2023-01-01 ENCOUNTER — HOSPITAL ENCOUNTER (INPATIENT)
Facility: HOSPITAL | Age: 58
LOS: 4 days | Discharge: HOME | DRG: 637 | End: 2023-04-04
Attending: EMERGENCY MEDICINE | Admitting: EMERGENCY MEDICINE
Payer: MEDICARE

## 2023-01-01 ENCOUNTER — HOSPITAL ENCOUNTER (INPATIENT)
Facility: HOSPITAL | Age: 58
LOS: 3 days | Discharge: HOME | DRG: 637 | End: 2023-03-07
Attending: EMERGENCY MEDICINE | Admitting: INTERNAL MEDICINE
Payer: MEDICARE

## 2023-01-01 ENCOUNTER — TELEPHONE (OUTPATIENT)
Dept: OPTOMETRY | Facility: CLINIC | Age: 58
End: 2023-01-01
Payer: MEDICARE

## 2023-01-01 ENCOUNTER — TELEPHONE (OUTPATIENT)
Dept: PALLIATIVE MEDICINE | Facility: CLINIC | Age: 58
End: 2023-01-01
Payer: MEDICARE

## 2023-01-01 ENCOUNTER — HOSPITAL ENCOUNTER (INPATIENT)
Facility: HOSPITAL | Age: 58
LOS: 9 days | Discharge: HOME | DRG: 291 | End: 2023-06-30
Attending: EMERGENCY MEDICINE | Admitting: EMERGENCY MEDICINE
Payer: MEDICARE

## 2023-01-01 ENCOUNTER — HOSPITAL ENCOUNTER (INPATIENT)
Facility: HOSPITAL | Age: 58
LOS: 19 days | Discharge: HOME | DRG: 871 | End: 2023-10-30
Attending: STUDENT IN AN ORGANIZED HEALTH CARE EDUCATION/TRAINING PROGRAM | Admitting: INTERNAL MEDICINE
Payer: MEDICARE

## 2023-01-01 ENCOUNTER — HOSPITAL ENCOUNTER (OUTPATIENT)
Dept: PREADMISSION TESTING | Facility: HOSPITAL | Age: 58
Discharge: HOME | End: 2023-07-13
Attending: SURGERY
Payer: MEDICARE

## 2023-01-01 ENCOUNTER — TELEPHONE (OUTPATIENT)
Dept: OPHTHALMOLOGY | Facility: CLINIC | Age: 58
End: 2023-01-01
Payer: MEDICARE

## 2023-01-01 ENCOUNTER — OFFICE VISIT (OUTPATIENT)
Dept: OPHTHALMOLOGY | Facility: CLINIC | Age: 58
End: 2023-01-01
Payer: MEDICARE

## 2023-01-01 ENCOUNTER — HOSPITAL ENCOUNTER (INPATIENT)
Facility: HOSPITAL | Age: 58
LOS: 1 days | Discharge: NURSING, REHAB OR LONG-TERM CARE | DRG: 291 | End: 2023-09-13
Attending: EMERGENCY MEDICINE | Admitting: INTERNAL MEDICINE
Payer: MEDICARE

## 2023-01-01 ENCOUNTER — TELEPHONE (OUTPATIENT)
Dept: ENDOSCOPY | Facility: HOSPITAL | Age: 58
End: 2023-01-01
Payer: MEDICARE

## 2023-01-01 ENCOUNTER — DOCUMENTATION ONLY (OUTPATIENT)
Dept: NEUROLOGY | Facility: CLINIC | Age: 58
End: 2023-01-01
Payer: MEDICARE

## 2023-01-01 ENCOUNTER — HOSPITAL ENCOUNTER (EMERGENCY)
Facility: HOSPITAL | Age: 58
Discharge: LEFT BEFORE DISCHARGE | End: 2023-05-16
Attending: EMERGENCY MEDICINE
Payer: MEDICARE

## 2023-01-01 ENCOUNTER — ANESTHESIA EVENT (OUTPATIENT)
Dept: SURGERY | Facility: HOSPITAL | Age: 58
DRG: 314 | End: 2023-01-01
Payer: MEDICARE

## 2023-01-01 ENCOUNTER — HOSPITAL ENCOUNTER (INPATIENT)
Facility: HOSPITAL | Age: 58
LOS: 3 days | Discharge: HOME | DRG: 291 | End: 2023-03-20
Attending: EMERGENCY MEDICINE | Admitting: STUDENT IN AN ORGANIZED HEALTH CARE EDUCATION/TRAINING PROGRAM
Payer: MEDICARE

## 2023-01-01 ENCOUNTER — HOSPITAL ENCOUNTER (OUTPATIENT)
Facility: HOSPITAL | Age: 58
End: 2023-02-23
Attending: EMERGENCY MEDICINE | Admitting: HOSPITALIST
Payer: MEDICARE

## 2023-01-01 ENCOUNTER — HOSPITAL ENCOUNTER (INPATIENT)
Facility: HOSPITAL | Age: 58
LOS: 3 days | Discharge: HOME | DRG: 291 | End: 2023-03-15
Attending: EMERGENCY MEDICINE | Admitting: STUDENT IN AN ORGANIZED HEALTH CARE EDUCATION/TRAINING PROGRAM
Payer: MEDICARE

## 2023-01-01 ENCOUNTER — HOSPITAL ENCOUNTER (INPATIENT)
Facility: HOSPITAL | Age: 58
LOS: 9 days | Discharge: HOME | DRG: 314 | End: 2023-08-29
Attending: EMERGENCY MEDICINE | Admitting: EMERGENCY MEDICINE
Payer: MEDICARE

## 2023-01-01 ENCOUNTER — HOSPITAL ENCOUNTER (OUTPATIENT)
Facility: HOSPITAL | Age: 58
Discharge: NURSING, REHAB OR LONG-TERM CARE | End: 2023-01-06
Attending: EMERGENCY MEDICINE | Admitting: INTERNAL MEDICINE
Payer: MEDICARE

## 2023-01-01 ENCOUNTER — PATIENT OUTREACH (OUTPATIENT)
Dept: ADMINISTRATIVE | Facility: CLINIC | Age: 58
End: 2023-01-01
Payer: MEDICARE

## 2023-01-01 ENCOUNTER — TELEPHONE (OUTPATIENT)
Dept: ENDOCRINOLOGY | Facility: CLINIC | Age: 58
End: 2023-01-01
Payer: MEDICARE

## 2023-01-01 ENCOUNTER — OFFICE VISIT (OUTPATIENT)
Dept: ENDOCRINOLOGY | Facility: CLINIC | Age: 58
End: 2023-01-01
Payer: MEDICARE

## 2023-01-01 ENCOUNTER — ANESTHESIA (OUTPATIENT)
Dept: SURGERY | Facility: HOSPITAL | Age: 58
DRG: 314 | End: 2023-01-01
Payer: MEDICARE

## 2023-01-01 ENCOUNTER — HOSPITAL ENCOUNTER (EMERGENCY)
Facility: HOSPITAL | Age: 58
Discharge: NURSING, REHAB OR LONG-TERM CARE | End: 2023-01-08
Attending: EMERGENCY MEDICINE
Payer: MEDICARE

## 2023-01-01 ENCOUNTER — HOSPITAL ENCOUNTER (EMERGENCY)
Facility: HOSPITAL | Age: 58
Discharge: HOME | End: 2023-06-14
Attending: EMERGENCY MEDICINE
Payer: MEDICARE

## 2023-01-01 ENCOUNTER — HOSPITAL ENCOUNTER (OUTPATIENT)
Facility: HOSPITAL | Age: 58
End: 2023-07-19
Attending: EMERGENCY MEDICINE | Admitting: HOSPITALIST
Payer: MEDICARE

## 2023-01-01 ENCOUNTER — HOSPITAL ENCOUNTER (INPATIENT)
Facility: HOSPITAL | Age: 58
LOS: 5 days | DRG: 870 | End: 2023-11-06
Attending: EMERGENCY MEDICINE | Admitting: EMERGENCY MEDICINE
Payer: MEDICARE

## 2023-01-01 ENCOUNTER — HOSPITAL ENCOUNTER (EMERGENCY)
Facility: HOSPITAL | Age: 58
Discharge: HOME | End: 2023-02-10
Attending: EMERGENCY MEDICINE
Payer: MEDICARE

## 2023-01-01 ENCOUNTER — HOSPITAL ENCOUNTER (OUTPATIENT)
Dept: INTERVENTIONAL RADIOLOGY/VASCULAR | Facility: HOSPITAL | Age: 58
Discharge: HOME | End: 2023-07-18
Attending: SURGERY
Payer: MEDICARE

## 2023-01-01 ENCOUNTER — HOSPITAL ENCOUNTER (INPATIENT)
Facility: HOSPITAL | Age: 58
LOS: 7 days | Discharge: HOME | DRG: 304 | End: 2023-03-29
Attending: EMERGENCY MEDICINE | Admitting: INTERNAL MEDICINE
Payer: MEDICARE

## 2023-01-01 VITALS
WEIGHT: 205 LBS | DIASTOLIC BLOOD PRESSURE: 101 MMHG | RESPIRATION RATE: 18 BRPM | OXYGEN SATURATION: 97 % | TEMPERATURE: 98 F | HEART RATE: 67 BPM | SYSTOLIC BLOOD PRESSURE: 206 MMHG | BODY MASS INDEX: 30.27 KG/M2

## 2023-01-01 VITALS
RESPIRATION RATE: 18 BRPM | TEMPERATURE: 98 F | OXYGEN SATURATION: 91 % | WEIGHT: 155 LBS | BODY MASS INDEX: 22.96 KG/M2 | SYSTOLIC BLOOD PRESSURE: 184 MMHG | HEART RATE: 71 BPM | HEIGHT: 69 IN | DIASTOLIC BLOOD PRESSURE: 80 MMHG

## 2023-01-01 VITALS
HEART RATE: 69 BPM | SYSTOLIC BLOOD PRESSURE: 162 MMHG | TEMPERATURE: 98 F | WEIGHT: 155.19 LBS | BODY MASS INDEX: 22.98 KG/M2 | DIASTOLIC BLOOD PRESSURE: 90 MMHG | RESPIRATION RATE: 19 BRPM | OXYGEN SATURATION: 99 % | HEIGHT: 69 IN

## 2023-01-01 VITALS
OXYGEN SATURATION: 37 % | HEIGHT: 69 IN | WEIGHT: 145.94 LBS | DIASTOLIC BLOOD PRESSURE: 51 MMHG | TEMPERATURE: 93 F | HEART RATE: 66 BPM | BODY MASS INDEX: 21.62 KG/M2 | SYSTOLIC BLOOD PRESSURE: 75 MMHG | RESPIRATION RATE: 25 BRPM

## 2023-01-01 VITALS
OXYGEN SATURATION: 100 % | HEART RATE: 60 BPM | RESPIRATION RATE: 13 BRPM | SYSTOLIC BLOOD PRESSURE: 139 MMHG | TEMPERATURE: 97 F | DIASTOLIC BLOOD PRESSURE: 75 MMHG

## 2023-01-01 VITALS
HEART RATE: 72 BPM | SYSTOLIC BLOOD PRESSURE: 149 MMHG | BODY MASS INDEX: 24.91 KG/M2 | TEMPERATURE: 98 F | RESPIRATION RATE: 20 BRPM | DIASTOLIC BLOOD PRESSURE: 72 MMHG | WEIGHT: 168.63 LBS | OXYGEN SATURATION: 93 %

## 2023-01-01 VITALS
HEART RATE: 70 BPM | BODY MASS INDEX: 24.88 KG/M2 | WEIGHT: 168 LBS | TEMPERATURE: 98 F | HEIGHT: 69 IN | SYSTOLIC BLOOD PRESSURE: 149 MMHG | DIASTOLIC BLOOD PRESSURE: 90 MMHG | OXYGEN SATURATION: 97 % | RESPIRATION RATE: 18 BRPM

## 2023-01-01 VITALS
DIASTOLIC BLOOD PRESSURE: 84 MMHG | TEMPERATURE: 97 F | WEIGHT: 176.38 LBS | SYSTOLIC BLOOD PRESSURE: 151 MMHG | DIASTOLIC BLOOD PRESSURE: 83 MMHG | BODY MASS INDEX: 26.12 KG/M2 | TEMPERATURE: 98 F | HEIGHT: 69 IN | HEART RATE: 74 BPM | OXYGEN SATURATION: 99 % | RESPIRATION RATE: 17 BRPM | HEART RATE: 68 BPM | SYSTOLIC BLOOD PRESSURE: 175 MMHG | OXYGEN SATURATION: 90 % | RESPIRATION RATE: 20 BRPM

## 2023-01-01 VITALS
TEMPERATURE: 98 F | HEIGHT: 69 IN | DIASTOLIC BLOOD PRESSURE: 77 MMHG | OXYGEN SATURATION: 90 % | RESPIRATION RATE: 18 BRPM | SYSTOLIC BLOOD PRESSURE: 170 MMHG | WEIGHT: 152.13 LBS | BODY MASS INDEX: 22.53 KG/M2 | HEART RATE: 84 BPM

## 2023-01-01 VITALS
OXYGEN SATURATION: 100 % | TEMPERATURE: 98 F | RESPIRATION RATE: 47 BRPM | DIASTOLIC BLOOD PRESSURE: 100 MMHG | SYSTOLIC BLOOD PRESSURE: 187 MMHG | HEART RATE: 79 BPM

## 2023-01-01 VITALS
OXYGEN SATURATION: 94 % | TEMPERATURE: 98 F | HEIGHT: 69 IN | DIASTOLIC BLOOD PRESSURE: 82 MMHG | WEIGHT: 143.31 LBS | BODY MASS INDEX: 21.22 KG/M2 | SYSTOLIC BLOOD PRESSURE: 130 MMHG | RESPIRATION RATE: 33 BRPM | HEART RATE: 75 BPM

## 2023-01-01 VITALS
DIASTOLIC BLOOD PRESSURE: 71 MMHG | TEMPERATURE: 98 F | BODY MASS INDEX: 22.49 KG/M2 | WEIGHT: 151.88 LBS | SYSTOLIC BLOOD PRESSURE: 146 MMHG | OXYGEN SATURATION: 94 % | RESPIRATION RATE: 20 BRPM | HEIGHT: 69 IN | HEART RATE: 65 BPM

## 2023-01-01 VITALS
SYSTOLIC BLOOD PRESSURE: 175 MMHG | HEIGHT: 69 IN | OXYGEN SATURATION: 95 % | DIASTOLIC BLOOD PRESSURE: 89 MMHG | HEART RATE: 65 BPM | TEMPERATURE: 98 F | RESPIRATION RATE: 21 BRPM | WEIGHT: 149.69 LBS | BODY MASS INDEX: 22.17 KG/M2

## 2023-01-01 VITALS
SYSTOLIC BLOOD PRESSURE: 127 MMHG | WEIGHT: 155.25 LBS | HEART RATE: 66 BPM | BODY MASS INDEX: 22.99 KG/M2 | TEMPERATURE: 98 F | DIASTOLIC BLOOD PRESSURE: 88 MMHG | HEIGHT: 69 IN | OXYGEN SATURATION: 93 % | RESPIRATION RATE: 18 BRPM

## 2023-01-01 VITALS
RESPIRATION RATE: 17 BRPM | WEIGHT: 156 LBS | HEART RATE: 66 BPM | HEIGHT: 69 IN | OXYGEN SATURATION: 93 % | DIASTOLIC BLOOD PRESSURE: 65 MMHG | TEMPERATURE: 98 F | BODY MASS INDEX: 23.11 KG/M2 | SYSTOLIC BLOOD PRESSURE: 137 MMHG

## 2023-01-01 VITALS
HEIGHT: 69 IN | BODY MASS INDEX: 24.88 KG/M2 | WEIGHT: 168 LBS | DIASTOLIC BLOOD PRESSURE: 69 MMHG | SYSTOLIC BLOOD PRESSURE: 145 MMHG | HEART RATE: 72 BPM

## 2023-01-01 VITALS
DIASTOLIC BLOOD PRESSURE: 77 MMHG | RESPIRATION RATE: 18 BRPM | TEMPERATURE: 98 F | HEART RATE: 69 BPM | WEIGHT: 149.69 LBS | SYSTOLIC BLOOD PRESSURE: 136 MMHG | BODY MASS INDEX: 22.17 KG/M2 | HEIGHT: 69 IN | OXYGEN SATURATION: 92 %

## 2023-01-01 VITALS
OXYGEN SATURATION: 100 % | BODY MASS INDEX: 24.81 KG/M2 | HEART RATE: 91 BPM | DIASTOLIC BLOOD PRESSURE: 80 MMHG | RESPIRATION RATE: 20 BRPM | TEMPERATURE: 98 F | HEIGHT: 69 IN | SYSTOLIC BLOOD PRESSURE: 169 MMHG

## 2023-01-01 VITALS
HEIGHT: 69 IN | OXYGEN SATURATION: 99 % | HEART RATE: 89 BPM | BODY MASS INDEX: 22.51 KG/M2 | DIASTOLIC BLOOD PRESSURE: 75 MMHG | WEIGHT: 152 LBS | SYSTOLIC BLOOD PRESSURE: 130 MMHG

## 2023-01-01 DIAGNOSIS — R73.9 HYPERGLYCEMIA: ICD-10-CM

## 2023-01-01 DIAGNOSIS — Z99.2 ESRD ON DIALYSIS: ICD-10-CM

## 2023-01-01 DIAGNOSIS — R06.89 CO2 NARCOSIS: Primary | ICD-10-CM

## 2023-01-01 DIAGNOSIS — I50.32 CHRONIC DIASTOLIC HEART FAILURE: Chronic | ICD-10-CM

## 2023-01-01 DIAGNOSIS — R07.9 CHEST PAIN: ICD-10-CM

## 2023-01-01 DIAGNOSIS — I46.9 CARDIAC ARREST: ICD-10-CM

## 2023-01-01 DIAGNOSIS — J96.22 ACUTE ON CHRONIC RESPIRATORY FAILURE WITH HYPOXIA AND HYPERCAPNIA: ICD-10-CM

## 2023-01-01 DIAGNOSIS — D63.1 ANEMIA IN ESRD (END-STAGE RENAL DISEASE): Chronic | ICD-10-CM

## 2023-01-01 DIAGNOSIS — J96.01 ACUTE RESPIRATORY FAILURE WITH HYPOXIA AND HYPERCAPNIA: Primary | ICD-10-CM

## 2023-01-01 DIAGNOSIS — R60.0 PERIORBITAL EDEMA: ICD-10-CM

## 2023-01-01 DIAGNOSIS — N18.6 TYPE 1 DIABETES MELLITUS WITH CHRONIC KIDNEY DISEASE ON CHRONIC DIALYSIS: ICD-10-CM

## 2023-01-01 DIAGNOSIS — J96.21 ACUTE ON CHRONIC RESPIRATORY FAILURE WITH HYPOXIA AND HYPERCAPNIA: ICD-10-CM

## 2023-01-01 DIAGNOSIS — M89.9 CHRONIC KIDNEY DISEASE-MINERAL AND BONE DISORDER: ICD-10-CM

## 2023-01-01 DIAGNOSIS — E87.0 TYPE 1 DIABETES MELLITUS WITH HYPEROSMOLAR HYPERGLYCEMIC STATE (HHS): Primary | ICD-10-CM

## 2023-01-01 DIAGNOSIS — Z71.89 ADVANCE CARE PLANNING: ICD-10-CM

## 2023-01-01 DIAGNOSIS — N18.6 END-STAGE RENAL DISEASE ON HEMODIALYSIS: ICD-10-CM

## 2023-01-01 DIAGNOSIS — R41.82 ALTERED MENTAL STATUS: ICD-10-CM

## 2023-01-01 DIAGNOSIS — D63.1 ANEMIA IN ESRD (END-STAGE RENAL DISEASE): ICD-10-CM

## 2023-01-01 DIAGNOSIS — J96.21 ACUTE ON CHRONIC RESPIRATORY FAILURE WITH HYPOXIA: ICD-10-CM

## 2023-01-01 DIAGNOSIS — R06.00 DYSPNEA, UNSPECIFIED TYPE: ICD-10-CM

## 2023-01-01 DIAGNOSIS — E11.3313 MODERATE NONPROLIFERATIVE DIABETIC RETINOPATHY OF BOTH EYES WITH MACULAR EDEMA ASSOCIATED WITH TYPE 2 DIABETES MELLITUS: Primary | ICD-10-CM

## 2023-01-01 DIAGNOSIS — R74.8 ELEVATED LIVER ENZYMES: ICD-10-CM

## 2023-01-01 DIAGNOSIS — Z79.4 TYPE 2 DIABETES MELLITUS WITH HYPERGLYCEMIA, WITH LONG-TERM CURRENT USE OF INSULIN: Primary | Chronic | ICD-10-CM

## 2023-01-01 DIAGNOSIS — R94.31 QT PROLONGATION: ICD-10-CM

## 2023-01-01 DIAGNOSIS — N18.6 ESRD (END STAGE RENAL DISEASE): ICD-10-CM

## 2023-01-01 DIAGNOSIS — H25.13 AGE-RELATED NUCLEAR CATARACT OF BOTH EYES: ICD-10-CM

## 2023-01-01 DIAGNOSIS — R06.02 SHORTNESS OF BREATH: ICD-10-CM

## 2023-01-01 DIAGNOSIS — Z51.5 PALLIATIVE CARE ENCOUNTER: ICD-10-CM

## 2023-01-01 DIAGNOSIS — T82.590D MALFUNCTION OF ARTERIOVENOUS GRAFT, SUBSEQUENT ENCOUNTER: Primary | ICD-10-CM

## 2023-01-01 DIAGNOSIS — J44.1 COPD WITH ACUTE EXACERBATION: ICD-10-CM

## 2023-01-01 DIAGNOSIS — N18.6 ESRD (END STAGE RENAL DISEASE): Primary | ICD-10-CM

## 2023-01-01 DIAGNOSIS — I16.1 HYPERTENSIVE EMERGENCY: ICD-10-CM

## 2023-01-01 DIAGNOSIS — H11.421 CHEMOSIS OF RIGHT CONJUNCTIVA: ICD-10-CM

## 2023-01-01 DIAGNOSIS — J96.02 ACUTE RESPIRATORY FAILURE WITH HYPOXIA AND HYPERCAPNIA: Primary | ICD-10-CM

## 2023-01-01 DIAGNOSIS — I50.9 CHF (CONGESTIVE HEART FAILURE): ICD-10-CM

## 2023-01-01 DIAGNOSIS — J96.21 ACUTE ON CHRONIC RESPIRATORY FAILURE WITH HYPOXIA: Primary | ICD-10-CM

## 2023-01-01 DIAGNOSIS — E16.2 HYPOGLYCEMIA: ICD-10-CM

## 2023-01-01 DIAGNOSIS — N25.81 SECONDARY HYPERPARATHYROIDISM OF RENAL ORIGIN: ICD-10-CM

## 2023-01-01 DIAGNOSIS — Z99.2 ESRD (END STAGE RENAL DISEASE) ON DIALYSIS: Primary | ICD-10-CM

## 2023-01-01 DIAGNOSIS — N18.6 ESRD ON HEMODIALYSIS: Primary | ICD-10-CM

## 2023-01-01 DIAGNOSIS — H05.222 EDEMA OF LEFT ORBIT: ICD-10-CM

## 2023-01-01 DIAGNOSIS — R58 BLEEDING: Primary | ICD-10-CM

## 2023-01-01 DIAGNOSIS — N18.6 ESRD ON DIALYSIS: ICD-10-CM

## 2023-01-01 DIAGNOSIS — J96.11 CHRONIC HYPOXEMIC RESPIRATORY FAILURE: ICD-10-CM

## 2023-01-01 DIAGNOSIS — Z79.4 TYPE 2 DIABETES MELLITUS WITH HYPERGLYCEMIA, WITH LONG-TERM CURRENT USE OF INSULIN: Primary | ICD-10-CM

## 2023-01-01 DIAGNOSIS — I26.94 MULTIPLE SUBSEGMENTAL PULMONARY EMBOLI WITHOUT ACUTE COR PULMONALE: ICD-10-CM

## 2023-01-01 DIAGNOSIS — N18.6 ANEMIA IN ESRD (END-STAGE RENAL DISEASE): Chronic | ICD-10-CM

## 2023-01-01 DIAGNOSIS — E10.69 TYPE 1 DIABETES MELLITUS WITH HYPEROSMOLAR HYPERGLYCEMIC STATE (HHS): Primary | ICD-10-CM

## 2023-01-01 DIAGNOSIS — E16.2 HYPOGLYCEMIA: Primary | ICD-10-CM

## 2023-01-01 DIAGNOSIS — T82.838A BLEEDING FROM DIALYSIS SHUNT, INITIAL ENCOUNTER: ICD-10-CM

## 2023-01-01 DIAGNOSIS — E87.70 HYPERVOLEMIA, UNSPECIFIED HYPERVOLEMIA TYPE: ICD-10-CM

## 2023-01-01 DIAGNOSIS — N18.6 ESRD (END STAGE RENAL DISEASE) ON DIALYSIS: Primary | ICD-10-CM

## 2023-01-01 DIAGNOSIS — H33.311 RETINAL TEAR, RIGHT: ICD-10-CM

## 2023-01-01 DIAGNOSIS — I50.9 ACUTE ON CHRONIC CONGESTIVE HEART FAILURE, UNSPECIFIED HEART FAILURE TYPE: ICD-10-CM

## 2023-01-01 DIAGNOSIS — J81.1 PULMONARY EDEMA: Primary | ICD-10-CM

## 2023-01-01 DIAGNOSIS — R04.0 EPISTAXIS: ICD-10-CM

## 2023-01-01 DIAGNOSIS — L98.8 FISTULA: ICD-10-CM

## 2023-01-01 DIAGNOSIS — R55 SYNCOPE: ICD-10-CM

## 2023-01-01 DIAGNOSIS — G93.1 ANOXIC BRAIN INJURY: ICD-10-CM

## 2023-01-01 DIAGNOSIS — R09.02 HYPOXIA: ICD-10-CM

## 2023-01-01 DIAGNOSIS — T82.838A BLEEDING PSEUDOANEURYSM OF LEFT BRACHIOCEPHALIC ARTERIOVENOUS FISTULA: ICD-10-CM

## 2023-01-01 DIAGNOSIS — E87.70 VOLUME OVERLOAD: ICD-10-CM

## 2023-01-01 DIAGNOSIS — Z99.2 TYPE 1 DIABETES MELLITUS WITH CHRONIC KIDNEY DISEASE ON CHRONIC DIALYSIS: ICD-10-CM

## 2023-01-01 DIAGNOSIS — E11.65 TYPE 2 DIABETES MELLITUS WITH HYPERGLYCEMIA, WITH LONG-TERM CURRENT USE OF INSULIN: Primary | ICD-10-CM

## 2023-01-01 DIAGNOSIS — N18.6 ANEMIA IN ESRD (END-STAGE RENAL DISEASE): ICD-10-CM

## 2023-01-01 DIAGNOSIS — E44.1 MALNUTRITION OF MILD DEGREE: ICD-10-CM

## 2023-01-01 DIAGNOSIS — H11.32 SUBCONJUNCTIVAL HEMORRHAGE OF LEFT EYE: ICD-10-CM

## 2023-01-01 DIAGNOSIS — E87.5 HYPERKALEMIA: ICD-10-CM

## 2023-01-01 DIAGNOSIS — E10.22 TYPE 1 DIABETES MELLITUS WITH CHRONIC KIDNEY DISEASE ON CHRONIC DIALYSIS: ICD-10-CM

## 2023-01-01 DIAGNOSIS — Z99.2 END-STAGE RENAL DISEASE ON HEMODIALYSIS: ICD-10-CM

## 2023-01-01 DIAGNOSIS — T82.838A BLEEDING FROM DIALYSIS SHUNT: ICD-10-CM

## 2023-01-01 DIAGNOSIS — I50.33 ACUTE ON CHRONIC DIASTOLIC HEART FAILURE: ICD-10-CM

## 2023-01-01 DIAGNOSIS — L24.A2 IRRITANT CONTACT DERMATITIS DUE TO FECAL, URINARY OR DUAL INCONTINENCE: ICD-10-CM

## 2023-01-01 DIAGNOSIS — E10.65 TYPE 1 DIABETES MELLITUS WITH HYPEROSMOLAR HYPERGLYCEMIC STATE (HHS): Primary | ICD-10-CM

## 2023-01-01 DIAGNOSIS — E11.65 TYPE 2 DIABETES MELLITUS WITH HYPERGLYCEMIA, WITH LONG-TERM CURRENT USE OF INSULIN: Primary | Chronic | ICD-10-CM

## 2023-01-01 DIAGNOSIS — E87.1 HYPONATREMIA: ICD-10-CM

## 2023-01-01 DIAGNOSIS — I50.32 CHRONIC DIASTOLIC HEART FAILURE: ICD-10-CM

## 2023-01-01 DIAGNOSIS — R73.9 HYPERGLYCEMIA: Primary | ICD-10-CM

## 2023-01-01 DIAGNOSIS — Z99.2 ESRD ON HEMODIALYSIS: ICD-10-CM

## 2023-01-01 DIAGNOSIS — N04.9 ANASARCA ASSOCIATED WITH DISORDER OF KIDNEY: ICD-10-CM

## 2023-01-01 DIAGNOSIS — N18.6 ESRD ON HEMODIALYSIS: ICD-10-CM

## 2023-01-01 DIAGNOSIS — E83.9 CHRONIC KIDNEY DISEASE-MINERAL AND BONE DISORDER: ICD-10-CM

## 2023-01-01 DIAGNOSIS — Z91.89 AT RISK FOR PROLONGED QT INTERVAL SYNDROME: ICD-10-CM

## 2023-01-01 DIAGNOSIS — N19 RENAL FAILURE: ICD-10-CM

## 2023-01-01 DIAGNOSIS — K70.31 ASCITES DUE TO ALCOHOLIC CIRRHOSIS: ICD-10-CM

## 2023-01-01 DIAGNOSIS — E87.70 HYPERVOLEMIA, UNSPECIFIED HYPERVOLEMIA TYPE: Primary | ICD-10-CM

## 2023-01-01 DIAGNOSIS — R06.02 SHORTNESS OF BREATH: Primary | ICD-10-CM

## 2023-01-01 DIAGNOSIS — J81.0 ACUTE PULMONARY EDEMA: ICD-10-CM

## 2023-01-01 DIAGNOSIS — T82.590A MALFUNCTION OF ARTERIOVENOUS GRAFT, INITIAL ENCOUNTER: ICD-10-CM

## 2023-01-01 DIAGNOSIS — H11.31 SUBCONJUNCTIVAL HEMORRHAGE OF RIGHT EYE: Primary | ICD-10-CM

## 2023-01-01 DIAGNOSIS — N18.9 CHRONIC KIDNEY DISEASE-MINERAL AND BONE DISORDER: ICD-10-CM

## 2023-01-01 DIAGNOSIS — T82.590D MALFUNCTION OF ARTERIOVENOUS GRAFT, SUBSEQUENT ENCOUNTER: ICD-10-CM

## 2023-01-01 DIAGNOSIS — R14.0 ABDOMINAL DISTENSION: ICD-10-CM

## 2023-01-01 DIAGNOSIS — I10 PRIMARY HYPERTENSION: ICD-10-CM

## 2023-01-01 DIAGNOSIS — Z99.2 ESRD ON HEMODIALYSIS: Primary | ICD-10-CM

## 2023-01-01 DIAGNOSIS — N18.6 END STAGE RENAL DISEASE: ICD-10-CM

## 2023-01-01 DIAGNOSIS — R04.0 RECURRENT EPISTAXIS: ICD-10-CM

## 2023-01-01 DIAGNOSIS — E11.10 DIABETIC KETOACIDOSIS WITHOUT COMA: ICD-10-CM

## 2023-01-01 LAB
ABO + RH BLD: NORMAL
ACANTHOCYTES BLD QL SMEAR: PRESENT
ACINETOBACTER CALCOACETICUS/BAUMANNII COMPLEX: NOT DETECTED
ACTH PLAS-MCNC: 39 PG/ML (ref 0–46)
AFP SERPL-MCNC: 3.7 NG/ML (ref 0–8.4)
ALBUMIN FLD-MCNC: 1.8 G/DL
ALBUMIN FLD-MCNC: 2 G/DL
ALBUMIN SERPL BCP-MCNC: 1.4 G/DL (ref 3.5–5.2)
ALBUMIN SERPL BCP-MCNC: 1.7 G/DL (ref 3.5–5.2)
ALBUMIN SERPL BCP-MCNC: 1.8 G/DL (ref 3.5–5.2)
ALBUMIN SERPL BCP-MCNC: 1.9 G/DL (ref 3.5–5.2)
ALBUMIN SERPL BCP-MCNC: 2 G/DL (ref 3.5–5.2)
ALBUMIN SERPL BCP-MCNC: 2.1 G/DL (ref 3.5–5.2)
ALBUMIN SERPL BCP-MCNC: 2.2 G/DL (ref 3.5–5.2)
ALBUMIN SERPL BCP-MCNC: 2.3 G/DL (ref 3.5–5.2)
ALBUMIN SERPL BCP-MCNC: 2.4 G/DL (ref 3.5–5.2)
ALBUMIN SERPL BCP-MCNC: 2.5 G/DL (ref 3.5–5.2)
ALBUMIN SERPL BCP-MCNC: 2.6 G/DL (ref 3.5–5.2)
ALBUMIN SERPL BCP-MCNC: 2.7 G/DL (ref 3.5–5.2)
ALBUMIN SERPL BCP-MCNC: 2.8 G/DL (ref 3.5–5.2)
ALBUMIN SERPL BCP-MCNC: 2.9 G/DL (ref 3.5–5.2)
ALBUMIN SERPL BCP-MCNC: 3 G/DL (ref 3.5–5.2)
ALBUMIN SERPL BCP-MCNC: 3.1 G/DL (ref 3.5–5.2)
ALBUMIN SERPL BCP-MCNC: 3.2 G/DL (ref 3.5–5.2)
ALBUMIN SERPL BCP-MCNC: 3.3 G/DL (ref 3.5–5.2)
ALLENS TEST: ABNORMAL
ALLENS TEST: NORMAL
ALLENS TEST: NORMAL
ALP SERPL-CCNC: 119 U/L (ref 55–135)
ALP SERPL-CCNC: 127 U/L (ref 55–135)
ALP SERPL-CCNC: 130 U/L (ref 55–135)
ALP SERPL-CCNC: 130 U/L (ref 55–135)
ALP SERPL-CCNC: 131 U/L (ref 55–135)
ALP SERPL-CCNC: 135 U/L (ref 55–135)
ALP SERPL-CCNC: 137 U/L (ref 55–135)
ALP SERPL-CCNC: 138 U/L (ref 55–135)
ALP SERPL-CCNC: 146 U/L (ref 55–135)
ALP SERPL-CCNC: 148 U/L (ref 55–135)
ALP SERPL-CCNC: 151 U/L (ref 55–135)
ALP SERPL-CCNC: 151 U/L (ref 55–135)
ALP SERPL-CCNC: 152 U/L (ref 55–135)
ALP SERPL-CCNC: 154 U/L (ref 55–135)
ALP SERPL-CCNC: 155 U/L (ref 55–135)
ALP SERPL-CCNC: 156 U/L (ref 55–135)
ALP SERPL-CCNC: 157 U/L (ref 55–135)
ALP SERPL-CCNC: 159 U/L (ref 55–135)
ALP SERPL-CCNC: 161 U/L (ref 55–135)
ALP SERPL-CCNC: 163 U/L (ref 55–135)
ALP SERPL-CCNC: 165 U/L (ref 55–135)
ALP SERPL-CCNC: 166 U/L (ref 55–135)
ALP SERPL-CCNC: 166 U/L (ref 55–135)
ALP SERPL-CCNC: 167 U/L (ref 55–135)
ALP SERPL-CCNC: 168 U/L (ref 55–135)
ALP SERPL-CCNC: 169 U/L (ref 55–135)
ALP SERPL-CCNC: 170 U/L (ref 55–135)
ALP SERPL-CCNC: 171 U/L (ref 55–135)
ALP SERPL-CCNC: 171 U/L (ref 55–135)
ALP SERPL-CCNC: 172 U/L (ref 55–135)
ALP SERPL-CCNC: 173 U/L (ref 55–135)
ALP SERPL-CCNC: 174 U/L (ref 55–135)
ALP SERPL-CCNC: 174 U/L (ref 55–135)
ALP SERPL-CCNC: 176 U/L (ref 55–135)
ALP SERPL-CCNC: 176 U/L (ref 55–135)
ALP SERPL-CCNC: 177 U/L (ref 55–135)
ALP SERPL-CCNC: 178 U/L (ref 55–135)
ALP SERPL-CCNC: 179 U/L (ref 55–135)
ALP SERPL-CCNC: 184 U/L (ref 55–135)
ALP SERPL-CCNC: 184 U/L (ref 55–135)
ALP SERPL-CCNC: 185 U/L (ref 55–135)
ALP SERPL-CCNC: 186 U/L (ref 55–135)
ALP SERPL-CCNC: 188 U/L (ref 55–135)
ALP SERPL-CCNC: 188 U/L (ref 55–135)
ALP SERPL-CCNC: 191 U/L (ref 55–135)
ALP SERPL-CCNC: 194 U/L (ref 55–135)
ALP SERPL-CCNC: 195 U/L (ref 55–135)
ALP SERPL-CCNC: 195 U/L (ref 55–135)
ALP SERPL-CCNC: 202 U/L (ref 55–135)
ALP SERPL-CCNC: 202 U/L (ref 55–135)
ALP SERPL-CCNC: 205 U/L (ref 55–135)
ALP SERPL-CCNC: 209 U/L (ref 55–135)
ALP SERPL-CCNC: 212 U/L (ref 55–135)
ALP SERPL-CCNC: 213 U/L (ref 55–135)
ALP SERPL-CCNC: 214 U/L (ref 55–135)
ALP SERPL-CCNC: 216 U/L (ref 55–135)
ALP SERPL-CCNC: 220 U/L (ref 55–135)
ALP SERPL-CCNC: 230 U/L (ref 55–135)
ALP SERPL-CCNC: 233 U/L (ref 55–135)
ALP SERPL-CCNC: 236 U/L (ref 55–135)
ALP SERPL-CCNC: 239 U/L (ref 55–135)
ALP SERPL-CCNC: 239 U/L (ref 55–135)
ALP SERPL-CCNC: 245 U/L (ref 55–135)
ALP SERPL-CCNC: 252 U/L (ref 55–135)
ALP SERPL-CCNC: 261 U/L (ref 55–135)
ALP SERPL-CCNC: 262 U/L (ref 55–135)
ALP SERPL-CCNC: 266 U/L (ref 55–135)
ALP SERPL-CCNC: 283 U/L (ref 55–135)
ALP SERPL-CCNC: 305 U/L (ref 55–135)
ALP SERPL-CCNC: 309 U/L (ref 55–135)
ALP SERPL-CCNC: 327 U/L (ref 55–135)
ALP SERPL-CCNC: 327 U/L (ref 55–135)
ALT SERPL W/O P-5'-P-CCNC: 113 U/L (ref 10–44)
ALT SERPL W/O P-5'-P-CCNC: 13 U/L (ref 10–44)
ALT SERPL W/O P-5'-P-CCNC: 158 U/L (ref 10–44)
ALT SERPL W/O P-5'-P-CCNC: 16 U/L (ref 10–44)
ALT SERPL W/O P-5'-P-CCNC: 16 U/L (ref 10–44)
ALT SERPL W/O P-5'-P-CCNC: 17 U/L (ref 10–44)
ALT SERPL W/O P-5'-P-CCNC: 18 U/L (ref 10–44)
ALT SERPL W/O P-5'-P-CCNC: 18 U/L (ref 10–44)
ALT SERPL W/O P-5'-P-CCNC: 186 U/L (ref 10–44)
ALT SERPL W/O P-5'-P-CCNC: 19 U/L (ref 10–44)
ALT SERPL W/O P-5'-P-CCNC: 20 U/L (ref 10–44)
ALT SERPL W/O P-5'-P-CCNC: 21 U/L (ref 10–44)
ALT SERPL W/O P-5'-P-CCNC: 22 U/L (ref 10–44)
ALT SERPL W/O P-5'-P-CCNC: 228 U/L (ref 10–44)
ALT SERPL W/O P-5'-P-CCNC: 23 U/L (ref 10–44)
ALT SERPL W/O P-5'-P-CCNC: 24 U/L (ref 10–44)
ALT SERPL W/O P-5'-P-CCNC: 25 U/L (ref 10–44)
ALT SERPL W/O P-5'-P-CCNC: 26 U/L (ref 10–44)
ALT SERPL W/O P-5'-P-CCNC: 27 U/L (ref 10–44)
ALT SERPL W/O P-5'-P-CCNC: 28 U/L (ref 10–44)
ALT SERPL W/O P-5'-P-CCNC: 28 U/L (ref 10–44)
ALT SERPL W/O P-5'-P-CCNC: 287 U/L (ref 10–44)
ALT SERPL W/O P-5'-P-CCNC: 29 U/L (ref 10–44)
ALT SERPL W/O P-5'-P-CCNC: 29 U/L (ref 10–44)
ALT SERPL W/O P-5'-P-CCNC: 292 U/L (ref 10–44)
ALT SERPL W/O P-5'-P-CCNC: 30 U/L (ref 10–44)
ALT SERPL W/O P-5'-P-CCNC: 31 U/L (ref 10–44)
ALT SERPL W/O P-5'-P-CCNC: 32 U/L (ref 10–44)
ALT SERPL W/O P-5'-P-CCNC: 33 U/L (ref 10–44)
ALT SERPL W/O P-5'-P-CCNC: 34 U/L (ref 10–44)
ALT SERPL W/O P-5'-P-CCNC: 34 U/L (ref 10–44)
ALT SERPL W/O P-5'-P-CCNC: 35 U/L (ref 10–44)
ALT SERPL W/O P-5'-P-CCNC: 35 U/L (ref 10–44)
ALT SERPL W/O P-5'-P-CCNC: 36 U/L (ref 10–44)
ALT SERPL W/O P-5'-P-CCNC: 36 U/L (ref 10–44)
ALT SERPL W/O P-5'-P-CCNC: 37 U/L (ref 10–44)
ALT SERPL W/O P-5'-P-CCNC: 37 U/L (ref 10–44)
ALT SERPL W/O P-5'-P-CCNC: 39 U/L (ref 10–44)
ALT SERPL W/O P-5'-P-CCNC: 39 U/L (ref 10–44)
ALT SERPL W/O P-5'-P-CCNC: 40 U/L (ref 10–44)
ALT SERPL W/O P-5'-P-CCNC: 41 U/L (ref 10–44)
ALT SERPL W/O P-5'-P-CCNC: 44 U/L (ref 10–44)
ALT SERPL W/O P-5'-P-CCNC: 46 U/L (ref 10–44)
ALT SERPL W/O P-5'-P-CCNC: 5 U/L (ref 10–44)
ALT SERPL W/O P-5'-P-CCNC: 51 U/L (ref 10–44)
ALT SERPL W/O P-5'-P-CCNC: 56 U/L (ref 10–44)
ALT SERPL W/O P-5'-P-CCNC: 6 U/L (ref 10–44)
ALT SERPL W/O P-5'-P-CCNC: 60 U/L (ref 10–44)
ALT SERPL W/O P-5'-P-CCNC: 61 U/L (ref 10–44)
ALT SERPL W/O P-5'-P-CCNC: 62 U/L (ref 10–44)
ALT SERPL W/O P-5'-P-CCNC: 69 U/L (ref 10–44)
ALT SERPL W/O P-5'-P-CCNC: 73 U/L (ref 10–44)
ALT SERPL W/O P-5'-P-CCNC: 79 U/L (ref 10–44)
ALT SERPL W/O P-5'-P-CCNC: 8 U/L (ref 10–44)
ALT SERPL W/O P-5'-P-CCNC: <5 U/L (ref 10–44)
AMMONIA PLAS-SCNC: 27 UMOL/L (ref 10–50)
AMMONIA PLAS-SCNC: 32 UMOL/L (ref 10–50)
AMPHETAMINES SERPL QL: NEGATIVE
ANION GAP SERPL CALC-SCNC: 10 MMOL/L (ref 8–16)
ANION GAP SERPL CALC-SCNC: 11 MMOL/L (ref 8–16)
ANION GAP SERPL CALC-SCNC: 12 MMOL/L (ref 8–16)
ANION GAP SERPL CALC-SCNC: 13 MMOL/L (ref 8–16)
ANION GAP SERPL CALC-SCNC: 14 MMOL/L (ref 8–16)
ANION GAP SERPL CALC-SCNC: 15 MMOL/L (ref 8–16)
ANION GAP SERPL CALC-SCNC: 16 MMOL/L (ref 8–16)
ANION GAP SERPL CALC-SCNC: 17 MMOL/L (ref 8–16)
ANION GAP SERPL CALC-SCNC: 20 MMOL/L (ref 8–16)
ANION GAP SERPL CALC-SCNC: 21 MMOL/L (ref 8–16)
ANION GAP SERPL CALC-SCNC: 6 MMOL/L (ref 8–16)
ANION GAP SERPL CALC-SCNC: 8 MMOL/L (ref 8–16)
ANION GAP SERPL CALC-SCNC: 9 MMOL/L (ref 8–16)
ANISOCYTOSIS BLD QL SMEAR: SLIGHT
APPEARANCE FLD: ABNORMAL
APPEARANCE FLD: NORMAL
APTT PPP: 41 SEC (ref 21–32)
APTT PPP: 59.3 SEC (ref 21–32)
APTT PPP: 61.8 SEC (ref 21–32)
APTT PPP: 73.3 SEC (ref 21–32)
APTT PPP: >150 SEC (ref 21–32)
ASCENDING AORTA: 2.81 CM
ASCENDING AORTA: 3.09 CM
ASCENDING AORTA: 3.16 CM
AST SERPL-CCNC: 118 U/L (ref 10–40)
AST SERPL-CCNC: 12 U/L (ref 10–40)
AST SERPL-CCNC: 15 U/L (ref 10–40)
AST SERPL-CCNC: 15 U/L (ref 10–40)
AST SERPL-CCNC: 16 U/L (ref 10–40)
AST SERPL-CCNC: 170 U/L (ref 10–40)
AST SERPL-CCNC: 18 U/L (ref 10–40)
AST SERPL-CCNC: 180 U/L (ref 10–40)
AST SERPL-CCNC: 20 U/L (ref 10–40)
AST SERPL-CCNC: 21 U/L (ref 10–40)
AST SERPL-CCNC: 22 U/L (ref 10–40)
AST SERPL-CCNC: 23 U/L (ref 10–40)
AST SERPL-CCNC: 23 U/L (ref 10–40)
AST SERPL-CCNC: 24 U/L (ref 10–40)
AST SERPL-CCNC: 25 U/L (ref 10–40)
AST SERPL-CCNC: 26 U/L (ref 10–40)
AST SERPL-CCNC: 27 U/L (ref 10–40)
AST SERPL-CCNC: 28 U/L (ref 10–40)
AST SERPL-CCNC: 29 U/L (ref 10–40)
AST SERPL-CCNC: 299 U/L (ref 10–40)
AST SERPL-CCNC: 30 U/L (ref 10–40)
AST SERPL-CCNC: 32 U/L (ref 10–40)
AST SERPL-CCNC: 32 U/L (ref 10–40)
AST SERPL-CCNC: 33 U/L (ref 10–40)
AST SERPL-CCNC: 33 U/L (ref 10–40)
AST SERPL-CCNC: 34 U/L (ref 10–40)
AST SERPL-CCNC: 35 U/L (ref 10–40)
AST SERPL-CCNC: 37 U/L (ref 10–40)
AST SERPL-CCNC: 38 U/L (ref 10–40)
AST SERPL-CCNC: 39 U/L (ref 10–40)
AST SERPL-CCNC: 39 U/L (ref 10–40)
AST SERPL-CCNC: 43 U/L (ref 10–40)
AST SERPL-CCNC: 43 U/L (ref 10–40)
AST SERPL-CCNC: 45 U/L (ref 10–40)
AST SERPL-CCNC: 46 U/L (ref 10–40)
AST SERPL-CCNC: 47 U/L (ref 10–40)
AST SERPL-CCNC: 48 U/L (ref 10–40)
AST SERPL-CCNC: 50 U/L (ref 10–40)
AST SERPL-CCNC: 51 U/L (ref 10–40)
AST SERPL-CCNC: 52 U/L (ref 10–40)
AST SERPL-CCNC: 53 U/L (ref 10–40)
AST SERPL-CCNC: 54 U/L (ref 10–40)
AST SERPL-CCNC: 590 U/L (ref 10–40)
AST SERPL-CCNC: 63 U/L (ref 10–40)
AST SERPL-CCNC: 64 U/L (ref 10–40)
AST SERPL-CCNC: 650 U/L (ref 10–40)
AST SERPL-CCNC: 67 U/L (ref 10–40)
AST SERPL-CCNC: 68 U/L (ref 10–40)
AST SERPL-CCNC: 74 U/L (ref 10–40)
AST SERPL-CCNC: 75 U/L (ref 10–40)
AST SERPL-CCNC: 75 U/L (ref 10–40)
AST SERPL-CCNC: 91 U/L (ref 10–40)
AST SERPL-CCNC: 92 U/L (ref 10–40)
AST SERPL-CCNC: 96 U/L (ref 10–40)
AV INDEX (PROSTH): 0.62
AV INDEX (PROSTH): 0.66
AV INDEX (PROSTH): 0.76
AV MEAN GRADIENT: 1 MMHG
AV MEAN GRADIENT: 4 MMHG
AV MEAN GRADIENT: 4 MMHG
AV PEAK GRADIENT: 2 MMHG
AV PEAK GRADIENT: 7 MMHG
AV PEAK GRADIENT: 8 MMHG
AV VALVE AREA BY VELOCITY RATIO: 2.02 CM²
AV VALVE AREA BY VELOCITY RATIO: 2.12 CM²
AV VALVE AREA: 2 CM²
AV VALVE AREA: 2.22 CM2
AV VALVE AREA: 2.29 CM²
AV VELOCITY RATIO: 0.58
AV VELOCITY RATIO: 0.61
AV VELOCITY RATIO: 0.66
B-OH-BUTYR BLD STRIP-SCNC: 0 MMOL/L (ref 0–0.5)
B-OH-BUTYR BLD STRIP-SCNC: 0.2 MMOL/L (ref 0–0.5)
B-OH-BUTYR BLD STRIP-SCNC: 0.8 MMOL/L (ref 0–0.5)
B-OH-BUTYR BLD STRIP-SCNC: 1.1 MMOL/L (ref 0–0.5)
B-OH-BUTYR BLD STRIP-SCNC: 1.8 MMOL/L (ref 0–0.5)
B-OH-BUTYR BLD STRIP-SCNC: 2.4 MMOL/L (ref 0–0.5)
BACTERIA BLD CULT: ABNORMAL
BACTERIA BLD CULT: NORMAL
BACTERIA SPEC AEROBE CULT: NO GROWTH
BACTERIA SPEC ANAEROBE CULT: NORMAL
BACTEROIDES FRAGILIS: NOT DETECTED
BARBITURATES SERPL QL SCN: NEGATIVE
BASO STIPL BLD QL SMEAR: ABNORMAL
BASOPHILS # BLD AUTO: 0 K/UL (ref 0–0.2)
BASOPHILS # BLD AUTO: 0.01 K/UL (ref 0–0.2)
BASOPHILS # BLD AUTO: 0.02 K/UL (ref 0–0.2)
BASOPHILS # BLD AUTO: 0.03 K/UL (ref 0–0.2)
BASOPHILS # BLD AUTO: 0.04 K/UL (ref 0–0.2)
BASOPHILS # BLD AUTO: 0.04 K/UL (ref 0–0.2)
BASOPHILS # BLD AUTO: 0.05 K/UL (ref 0–0.2)
BASOPHILS # BLD AUTO: 0.06 K/UL (ref 0–0.2)
BASOPHILS # BLD AUTO: ABNORMAL K/UL (ref 0–0.2)
BASOPHILS # BLD AUTO: ABNORMAL K/UL (ref 0–0.2)
BASOPHILS NFR BLD: 0 % (ref 0–1.9)
BASOPHILS NFR BLD: 0.1 % (ref 0–1.9)
BASOPHILS NFR BLD: 0.2 % (ref 0–1.9)
BASOPHILS NFR BLD: 0.3 % (ref 0–1.9)
BASOPHILS NFR BLD: 0.4 % (ref 0–1.9)
BASOPHILS NFR BLD: 0.5 % (ref 0–1.9)
BASOPHILS NFR BLD: 0.6 % (ref 0–1.9)
BASOPHILS NFR BLD: 0.7 % (ref 0–1.9)
BASOPHILS NFR BLD: 0.8 % (ref 0–1.9)
BASOPHILS NFR BLD: 0.9 % (ref 0–1.9)
BASOPHILS NFR BLD: 1 % (ref 0–1.9)
BASOPHILS NFR BLD: 1.1 % (ref 0–1.9)
BASOPHILS NFR BLD: 1.2 % (ref 0–1.9)
BASOPHILS NFR BLD: 1.4 % (ref 0–1.9)
BENZODIAZ SERPL QL SCN: NEGATIVE
BILIRUB SERPL-MCNC: 0.5 MG/DL (ref 0.1–1)
BILIRUB SERPL-MCNC: 0.6 MG/DL (ref 0.1–1)
BILIRUB SERPL-MCNC: 0.7 MG/DL (ref 0.1–1)
BILIRUB SERPL-MCNC: 0.8 MG/DL (ref 0.1–1)
BILIRUB SERPL-MCNC: 0.9 MG/DL (ref 0.1–1)
BILIRUB SERPL-MCNC: 1 MG/DL (ref 0.1–1)
BILIRUB SERPL-MCNC: 1.2 MG/DL (ref 0.1–1)
BILIRUB SERPL-MCNC: 1.3 MG/DL (ref 0.1–1)
BILIRUB SERPL-MCNC: 1.5 MG/DL (ref 0.1–1)
BILIRUB SERPL-MCNC: 1.5 MG/DL (ref 0.1–1)
BILIRUB SERPL-MCNC: 1.6 MG/DL (ref 0.1–1)
BILIRUB SERPL-MCNC: 1.8 MG/DL (ref 0.1–1)
BILIRUB SERPL-MCNC: 2.3 MG/DL (ref 0.1–1)
BLD GP AB SCN CELLS X3 SERPL QL: NORMAL
BLD PROD TYP BPU: NORMAL
BLOOD UNIT EXPIRATION DATE: NORMAL
BLOOD UNIT TYPE CODE: 6200
BLOOD UNIT TYPE: NORMAL
BNP SERPL-MCNC: 1731 PG/ML (ref 0–99)
BNP SERPL-MCNC: 2762 PG/ML (ref 0–99)
BNP SERPL-MCNC: 3281 PG/ML (ref 0–99)
BNP SERPL-MCNC: 3393 PG/ML (ref 0–99)
BNP SERPL-MCNC: 3818 PG/ML (ref 0–99)
BNP SERPL-MCNC: 4520 PG/ML (ref 0–99)
BNP SERPL-MCNC: 4568 PG/ML (ref 0–99)
BNP SERPL-MCNC: 4802 PG/ML (ref 0–99)
BNP SERPL-MCNC: >4900 PG/ML (ref 0–99)
BNP SERPL-MCNC: >4900 PG/ML (ref 0–99)
BODY FLD TYPE: ABNORMAL
BODY FLD TYPE: NORMAL
BODY FLUID SOURCE, LDH: NORMAL
BODY FLUID SOURCE, LDH: NORMAL
BSA FOR ECHO PROCEDURE: 1.8 M2
BSA FOR ECHO PROCEDURE: 1.83 M2
BSA FOR ECHO PROCEDURE: 1.86 M2
BUN SERPL-MCNC: 10 MG/DL (ref 6–20)
BUN SERPL-MCNC: 12 MG/DL (ref 6–20)
BUN SERPL-MCNC: 15 MG/DL (ref 6–20)
BUN SERPL-MCNC: 15 MG/DL (ref 6–20)
BUN SERPL-MCNC: 16 MG/DL (ref 6–20)
BUN SERPL-MCNC: 16 MG/DL (ref 6–20)
BUN SERPL-MCNC: 17 MG/DL (ref 6–20)
BUN SERPL-MCNC: 17 MG/DL (ref 6–20)
BUN SERPL-MCNC: 18 MG/DL (ref 6–20)
BUN SERPL-MCNC: 20 MG/DL (ref 6–20)
BUN SERPL-MCNC: 23 MG/DL (ref 6–20)
BUN SERPL-MCNC: 25 MG/DL (ref 6–20)
BUN SERPL-MCNC: 26 MG/DL (ref 6–20)
BUN SERPL-MCNC: 27 MG/DL (ref 6–20)
BUN SERPL-MCNC: 27 MG/DL (ref 6–20)
BUN SERPL-MCNC: 28 MG/DL (ref 6–20)
BUN SERPL-MCNC: 28 MG/DL (ref 6–30)
BUN SERPL-MCNC: 29 MG/DL (ref 6–20)
BUN SERPL-MCNC: 29 MG/DL (ref 6–30)
BUN SERPL-MCNC: 30 MG/DL (ref 6–20)
BUN SERPL-MCNC: 31 MG/DL (ref 6–20)
BUN SERPL-MCNC: 31 MG/DL (ref 6–20)
BUN SERPL-MCNC: 32 MG/DL (ref 6–20)
BUN SERPL-MCNC: 33 MG/DL (ref 6–20)
BUN SERPL-MCNC: 34 MG/DL (ref 6–20)
BUN SERPL-MCNC: 34 MG/DL (ref 6–20)
BUN SERPL-MCNC: 35 MG/DL (ref 6–20)
BUN SERPL-MCNC: 35 MG/DL (ref 6–30)
BUN SERPL-MCNC: 36 MG/DL (ref 6–20)
BUN SERPL-MCNC: 36 MG/DL (ref 6–30)
BUN SERPL-MCNC: 37 MG/DL (ref 6–20)
BUN SERPL-MCNC: 38 MG/DL (ref 6–20)
BUN SERPL-MCNC: 39 MG/DL (ref 6–20)
BUN SERPL-MCNC: 39 MG/DL (ref 6–20)
BUN SERPL-MCNC: 40 MG/DL (ref 6–20)
BUN SERPL-MCNC: 41 MG/DL (ref 6–20)
BUN SERPL-MCNC: 42 MG/DL (ref 6–20)
BUN SERPL-MCNC: 43 MG/DL (ref 6–20)
BUN SERPL-MCNC: 44 MG/DL (ref 6–20)
BUN SERPL-MCNC: 44 MG/DL (ref 6–20)
BUN SERPL-MCNC: 45 MG/DL (ref 6–20)
BUN SERPL-MCNC: 46 MG/DL (ref 6–20)
BUN SERPL-MCNC: 47 MG/DL (ref 6–20)
BUN SERPL-MCNC: 47 MG/DL (ref 6–30)
BUN SERPL-MCNC: 48 MG/DL (ref 6–20)
BUN SERPL-MCNC: 49 MG/DL (ref 6–20)
BUN SERPL-MCNC: 50 MG/DL (ref 6–20)
BUN SERPL-MCNC: 51 MG/DL (ref 6–20)
BUN SERPL-MCNC: 53 MG/DL (ref 6–20)
BUN SERPL-MCNC: 53 MG/DL (ref 6–20)
BUN SERPL-MCNC: 54 MG/DL (ref 6–20)
BUN SERPL-MCNC: 55 MG/DL (ref 6–20)
BUN SERPL-MCNC: 56 MG/DL (ref 6–20)
BUN SERPL-MCNC: 57 MG/DL (ref 6–20)
BUN SERPL-MCNC: 57 MG/DL (ref 6–20)
BUN SERPL-MCNC: 58 MG/DL (ref 6–20)
BUN SERPL-MCNC: 58 MG/DL (ref 6–30)
BUN SERPL-MCNC: 59 MG/DL (ref 6–20)
BUN SERPL-MCNC: 60 MG/DL (ref 6–20)
BUN SERPL-MCNC: 64 MG/DL (ref 6–20)
BUN SERPL-MCNC: 64 MG/DL (ref 6–30)
BUN SERPL-MCNC: 65 MG/DL (ref 6–20)
BUN SERPL-MCNC: 66 MG/DL (ref 6–20)
BUN SERPL-MCNC: 66 MG/DL (ref 6–30)
BUN SERPL-MCNC: 73 MG/DL (ref 6–20)
BUN SERPL-MCNC: 75 MG/DL (ref 6–30)
BUN SERPL-MCNC: 77 MG/DL (ref 6–20)
BUN SERPL-MCNC: 78 MG/DL (ref 6–20)
BUN SERPL-MCNC: 78 MG/DL (ref 6–20)
BUN SERPL-MCNC: 9 MG/DL (ref 6–20)
BUN SERPL-MCNC: 92 MG/DL (ref 6–20)
BURR CELLS BLD QL SMEAR: ABNORMAL
BZE SERPL QL: NEGATIVE
C PEPTIDE SERPL-MCNC: 0.09 NG/ML (ref 0.78–5.19)
C PEPTIDE SERPL-MCNC: 0.09 NG/ML (ref 0.78–5.19)
C PEPTIDE SERPL-MCNC: <0.08 NG/ML (ref 0.78–5.19)
CA-I BLDV-SCNC: 0.94 MMOL/L (ref 1.06–1.42)
CALCIUM SERPL-MCNC: 5.5 MG/DL (ref 8.7–10.5)
CALCIUM SERPL-MCNC: 6.7 MG/DL (ref 8.7–10.5)
CALCIUM SERPL-MCNC: 7 MG/DL (ref 8.7–10.5)
CALCIUM SERPL-MCNC: 7 MG/DL (ref 8.7–10.5)
CALCIUM SERPL-MCNC: 7.1 MG/DL (ref 8.7–10.5)
CALCIUM SERPL-MCNC: 7.1 MG/DL (ref 8.7–10.5)
CALCIUM SERPL-MCNC: 7.2 MG/DL (ref 8.7–10.5)
CALCIUM SERPL-MCNC: 7.3 MG/DL (ref 8.7–10.5)
CALCIUM SERPL-MCNC: 7.4 MG/DL (ref 8.7–10.5)
CALCIUM SERPL-MCNC: 7.5 MG/DL (ref 8.7–10.5)
CALCIUM SERPL-MCNC: 7.6 MG/DL (ref 8.7–10.5)
CALCIUM SERPL-MCNC: 7.7 MG/DL (ref 8.7–10.5)
CALCIUM SERPL-MCNC: 7.8 MG/DL (ref 8.7–10.5)
CALCIUM SERPL-MCNC: 7.9 MG/DL (ref 8.7–10.5)
CALCIUM SERPL-MCNC: 8 MG/DL (ref 8.7–10.5)
CALCIUM SERPL-MCNC: 8.1 MG/DL (ref 8.7–10.5)
CALCIUM SERPL-MCNC: 8.2 MG/DL (ref 8.7–10.5)
CALCIUM SERPL-MCNC: 8.3 MG/DL (ref 8.7–10.5)
CALCIUM SERPL-MCNC: 8.4 MG/DL (ref 8.7–10.5)
CALCIUM SERPL-MCNC: 8.5 MG/DL (ref 8.7–10.5)
CALCIUM SERPL-MCNC: 8.6 MG/DL (ref 8.7–10.5)
CALCIUM SERPL-MCNC: 8.6 MG/DL (ref 8.7–10.5)
CALCIUM SERPL-MCNC: 8.7 MG/DL (ref 8.7–10.5)
CALCIUM SERPL-MCNC: 8.9 MG/DL (ref 8.7–10.5)
CALCIUM SERPL-MCNC: 9.2 MG/DL (ref 8.7–10.5)
CANCER AG19-9 SERPL-ACNC: 42.3 U/ML (ref 0–40)
CANDIDA ALBICANS: NOT DETECTED
CANDIDA AURIS: NOT DETECTED
CANDIDA GLABRATA: NOT DETECTED
CANDIDA KRUSEI: NOT DETECTED
CANDIDA PARAPSILOSIS: NOT DETECTED
CANDIDA TROPICALIS: NOT DETECTED
CARBOXYTHC SERPL QL SCN: NEGATIVE
CEA SERPL-MCNC: 3.8 NG/ML (ref 0–5)
CHLORIDE SERPL-SCNC: 100 MMOL/L (ref 95–110)
CHLORIDE SERPL-SCNC: 101 MMOL/L (ref 95–110)
CHLORIDE SERPL-SCNC: 102 MMOL/L (ref 95–110)
CHLORIDE SERPL-SCNC: 103 MMOL/L (ref 95–110)
CHLORIDE SERPL-SCNC: 104 MMOL/L (ref 95–110)
CHLORIDE SERPL-SCNC: 105 MMOL/L (ref 95–110)
CHLORIDE SERPL-SCNC: 106 MMOL/L (ref 95–110)
CHLORIDE SERPL-SCNC: 106 MMOL/L (ref 95–110)
CHLORIDE SERPL-SCNC: 107 MMOL/L (ref 95–110)
CHLORIDE SERPL-SCNC: 115 MMOL/L (ref 95–110)
CHLORIDE SERPL-SCNC: 88 MMOL/L (ref 95–110)
CHLORIDE SERPL-SCNC: 91 MMOL/L (ref 95–110)
CHLORIDE SERPL-SCNC: 92 MMOL/L (ref 95–110)
CHLORIDE SERPL-SCNC: 92 MMOL/L (ref 95–110)
CHLORIDE SERPL-SCNC: 93 MMOL/L (ref 95–110)
CHLORIDE SERPL-SCNC: 93 MMOL/L (ref 95–110)
CHLORIDE SERPL-SCNC: 94 MMOL/L (ref 95–110)
CHLORIDE SERPL-SCNC: 95 MMOL/L (ref 95–110)
CHLORIDE SERPL-SCNC: 96 MMOL/L (ref 95–110)
CHLORIDE SERPL-SCNC: 97 MMOL/L (ref 95–110)
CHLORIDE SERPL-SCNC: 98 MMOL/L (ref 95–110)
CHLORIDE SERPL-SCNC: 99 MMOL/L (ref 95–110)
CHOLEST SERPL-MCNC: 70 MG/DL (ref 120–199)
CHOLEST/HDLC SERPL: 2.1 {RATIO} (ref 2–5)
CK SERPL-CCNC: 200 U/L (ref 20–200)
CO2 SERPL-SCNC: 12 MMOL/L (ref 23–29)
CO2 SERPL-SCNC: 14 MMOL/L (ref 23–29)
CO2 SERPL-SCNC: 15 MMOL/L (ref 23–29)
CO2 SERPL-SCNC: 16 MMOL/L (ref 23–29)
CO2 SERPL-SCNC: 16 MMOL/L (ref 23–29)
CO2 SERPL-SCNC: 17 MMOL/L (ref 23–29)
CO2 SERPL-SCNC: 18 MMOL/L (ref 23–29)
CO2 SERPL-SCNC: 19 MMOL/L (ref 23–29)
CO2 SERPL-SCNC: 20 MMOL/L (ref 23–29)
CO2 SERPL-SCNC: 21 MMOL/L (ref 23–29)
CO2 SERPL-SCNC: 22 MMOL/L (ref 23–29)
CO2 SERPL-SCNC: 23 MMOL/L (ref 23–29)
CO2 SERPL-SCNC: 24 MMOL/L (ref 23–29)
CO2 SERPL-SCNC: 25 MMOL/L (ref 23–29)
CO2 SERPL-SCNC: 26 MMOL/L (ref 23–29)
CO2 SERPL-SCNC: 27 MMOL/L (ref 23–29)
CO2 SERPL-SCNC: 28 MMOL/L (ref 23–29)
CO2 SERPL-SCNC: 29 MMOL/L (ref 23–29)
CO2 SERPL-SCNC: 30 MMOL/L (ref 23–29)
CO2 SERPL-SCNC: 31 MMOL/L (ref 23–29)
CODING SYSTEM: NORMAL
COLOR FLD: YELLOW
COLOR FLD: YELLOW
CORTIS SERPL-MCNC: 32.1 UG/DL
CREAT SERPL-MCNC: 0.7 MG/DL (ref 0.5–1.4)
CREAT SERPL-MCNC: 0.8 MG/DL (ref 0.5–1.4)
CREAT SERPL-MCNC: 1.2 MG/DL (ref 0.5–1.4)
CREAT SERPL-MCNC: 1.4 MG/DL (ref 0.5–1.4)
CREAT SERPL-MCNC: 1.5 MG/DL (ref 0.5–1.4)
CREAT SERPL-MCNC: 1.6 MG/DL (ref 0.5–1.4)
CREAT SERPL-MCNC: 2 MG/DL (ref 0.5–1.4)
CREAT SERPL-MCNC: 2.2 MG/DL (ref 0.5–1.4)
CREAT SERPL-MCNC: 2.2 MG/DL (ref 0.5–1.4)
CREAT SERPL-MCNC: 2.3 MG/DL (ref 0.5–1.4)
CREAT SERPL-MCNC: 2.4 MG/DL (ref 0.5–1.4)
CREAT SERPL-MCNC: 2.6 MG/DL (ref 0.5–1.4)
CREAT SERPL-MCNC: 2.6 MG/DL (ref 0.5–1.4)
CREAT SERPL-MCNC: 2.7 MG/DL (ref 0.5–1.4)
CREAT SERPL-MCNC: 2.8 MG/DL (ref 0.5–1.4)
CREAT SERPL-MCNC: 2.9 MG/DL (ref 0.5–1.4)
CREAT SERPL-MCNC: 3 MG/DL (ref 0.5–1.4)
CREAT SERPL-MCNC: 3 MG/DL (ref 0.5–1.4)
CREAT SERPL-MCNC: 3.1 MG/DL (ref 0.5–1.4)
CREAT SERPL-MCNC: 3.1 MG/DL (ref 0.5–1.4)
CREAT SERPL-MCNC: 3.2 MG/DL (ref 0.5–1.4)
CREAT SERPL-MCNC: 3.3 MG/DL (ref 0.5–1.4)
CREAT SERPL-MCNC: 3.4 MG/DL (ref 0.5–1.4)
CREAT SERPL-MCNC: 3.4 MG/DL (ref 0.5–1.4)
CREAT SERPL-MCNC: 3.5 MG/DL (ref 0.5–1.4)
CREAT SERPL-MCNC: 3.6 MG/DL (ref 0.5–1.4)
CREAT SERPL-MCNC: 3.7 MG/DL (ref 0.5–1.4)
CREAT SERPL-MCNC: 3.8 MG/DL (ref 0.5–1.4)
CREAT SERPL-MCNC: 3.9 MG/DL (ref 0.5–1.4)
CREAT SERPL-MCNC: 4 MG/DL (ref 0.5–1.4)
CREAT SERPL-MCNC: 4.1 MG/DL (ref 0.5–1.4)
CREAT SERPL-MCNC: 4.2 MG/DL (ref 0.5–1.4)
CREAT SERPL-MCNC: 4.3 MG/DL (ref 0.5–1.4)
CREAT SERPL-MCNC: 4.3 MG/DL (ref 0.5–1.4)
CREAT SERPL-MCNC: 4.4 MG/DL (ref 0.5–1.4)
CREAT SERPL-MCNC: 4.5 MG/DL (ref 0.5–1.4)
CREAT SERPL-MCNC: 4.6 MG/DL (ref 0.5–1.4)
CREAT SERPL-MCNC: 4.7 MG/DL (ref 0.5–1.4)
CREAT SERPL-MCNC: 4.8 MG/DL (ref 0.5–1.4)
CREAT SERPL-MCNC: 4.9 MG/DL (ref 0.5–1.4)
CREAT SERPL-MCNC: 5 MG/DL (ref 0.5–1.4)
CREAT SERPL-MCNC: 5.1 MG/DL (ref 0.5–1.4)
CREAT SERPL-MCNC: 5.1 MG/DL (ref 0.5–1.4)
CREAT SERPL-MCNC: 5.2 MG/DL (ref 0.5–1.4)
CREAT SERPL-MCNC: 5.2 MG/DL (ref 0.5–1.4)
CREAT SERPL-MCNC: 5.3 MG/DL (ref 0.5–1.4)
CREAT SERPL-MCNC: 5.5 MG/DL (ref 0.5–1.4)
CREAT SERPL-MCNC: 5.6 MG/DL (ref 0.5–1.4)
CREAT SERPL-MCNC: 5.7 MG/DL (ref 0.5–1.4)
CREAT SERPL-MCNC: 5.8 MG/DL (ref 0.5–1.4)
CREAT SERPL-MCNC: 5.9 MG/DL (ref 0.5–1.4)
CROSSMATCH INTERPRETATION: NORMAL
CRP SERPL-MCNC: 13.3 MG/L (ref 0–8.2)
CRYPTOCOCCUS NEOFORMANS/GATTII: NOT DETECTED
CTX-M GENE: ABNORMAL
CV ECHO LV RWT: 0.39 CM
CV ECHO LV RWT: 0.6 CM
CV ECHO LV RWT: 0.9 CM
DELSYS: ABNORMAL
DIFFERENTIAL METHOD: ABNORMAL
DISPENSE STATUS: NORMAL
DOHLE BOD BLD QL SMEAR: PRESENT
DOP CALC AO PEAK VEL: 0.72 M/S
DOP CALC AO PEAK VEL: 1.3 M/S
DOP CALC AO PEAK VEL: 1.45 M/S
DOP CALC AO VTI: 28.45 CM
DOP CALC AO VTI: 30.25 CM
DOP CALC AO VTI: 7.91 CM
DOP CALC LVOT AREA: 2.9 CM2
DOP CALC LVOT AREA: 3.2 CM2
DOP CALC LVOT AREA: 3.5 CM2
DOP CALC LVOT DIAMETER: 1.93 CM
DOP CALC LVOT DIAMETER: 2.02 CM
DOP CALC LVOT DIAMETER: 2.1 CM
DOP CALC LVOT PEAK VEL: 0.42 M/S
DOP CALC LVOT PEAK VEL: 0.86 M/S
DOP CALC LVOT PEAK VEL: 0.88 M/S
DOP CALC LVOT STROKE VOLUME: 18.11 CM3
DOP CALC LVOT STROKE VOLUME: 60.51 CM3
DOP CALC LVOT STROKE VOLUME: 63.22 CM3
DOP CALCLVOT PEAK VEL VTI: 18.89 CM
DOP CALCLVOT PEAK VEL VTI: 21.62 CM
DOP CALCLVOT PEAK VEL VTI: 5.23 CM
E WAVE DECELERATION TIME: 155.93 MSEC
E WAVE DECELERATION TIME: 175.2 MSEC
E WAVE DECELERATION TIME: 185.67 MSEC
E/A RATIO: 0.86
E/A RATIO: 0.88
E/A RATIO: 1.21
E/E' RATIO: 11 M/S
E/E' RATIO: 13 M/S
E/E' RATIO: 8.33 M/S
ECHO LV POSTERIOR WALL: 1.03 CM (ref 0.6–1.1)
ECHO LV POSTERIOR WALL: 1.4 CM (ref 0.6–1.1)
ECHO LV POSTERIOR WALL: 1.72 CM (ref 0.6–1.1)
EJECTION FRACTION: 55 %
ENTEROBACTER CLOACAE COMPLEX: NOT DETECTED
ENTEROBACTERALES: NOT DETECTED
ENTEROCOCCUS FAECALIS: DETECTED
ENTEROCOCCUS FAECIUM: NOT DETECTED
EOSINOPHIL # BLD AUTO: 0 K/UL (ref 0–0.5)
EOSINOPHIL # BLD AUTO: 0.1 K/UL (ref 0–0.5)
EOSINOPHIL # BLD AUTO: ABNORMAL K/UL (ref 0–0.5)
EOSINOPHIL # BLD AUTO: ABNORMAL K/UL (ref 0–0.5)
EOSINOPHIL NFR BLD: 0 % (ref 0–8)
EOSINOPHIL NFR BLD: 0.2 % (ref 0–8)
EOSINOPHIL NFR BLD: 0.3 % (ref 0–8)
EOSINOPHIL NFR BLD: 0.4 % (ref 0–8)
EOSINOPHIL NFR BLD: 0.5 % (ref 0–8)
EOSINOPHIL NFR BLD: 0.6 % (ref 0–8)
EOSINOPHIL NFR BLD: 0.7 % (ref 0–8)
EOSINOPHIL NFR BLD: 0.8 % (ref 0–8)
EOSINOPHIL NFR BLD: 0.8 % (ref 0–8)
EOSINOPHIL NFR BLD: 0.9 % (ref 0–8)
EOSINOPHIL NFR BLD: 1 % (ref 0–8)
EOSINOPHIL NFR BLD: 1 % (ref 0–8)
EOSINOPHIL NFR BLD: 1.1 % (ref 0–8)
EOSINOPHIL NFR BLD: 1.2 % (ref 0–8)
EOSINOPHIL NFR BLD: 1.2 % (ref 0–8)
EOSINOPHIL NFR BLD: 1.3 % (ref 0–8)
EOSINOPHIL NFR BLD: 1.3 % (ref 0–8)
EOSINOPHIL NFR BLD: 1.4 % (ref 0–8)
EOSINOPHIL NFR BLD: 1.4 % (ref 0–8)
EOSINOPHIL NFR BLD: 1.5 % (ref 0–8)
EOSINOPHIL NFR BLD: 1.6 % (ref 0–8)
EOSINOPHIL NFR BLD: 1.8 % (ref 0–8)
EOSINOPHIL NFR BLD: 1.9 % (ref 0–8)
EOSINOPHIL NFR BLD: 1.9 % (ref 0–8)
EOSINOPHIL NFR BLD: 2 % (ref 0–8)
EOSINOPHIL NFR BLD: 2.1 % (ref 0–8)
EOSINOPHIL NFR BLD: 2.2 % (ref 0–8)
EOSINOPHIL NFR BLD: 2.3 % (ref 0–8)
EOSINOPHIL NFR BLD: 2.5 % (ref 0–8)
EOSINOPHIL NFR BLD: 2.9 % (ref 0–8)
EOSINOPHIL NFR BLD: 3 % (ref 0–8)
EOSINOPHIL NFR BLD: 3.1 % (ref 0–8)
EOSINOPHIL NFR BLD: 3.5 % (ref 0–8)
EOSINOPHIL NFR BLD: 3.8 % (ref 0–8)
EOSINOPHIL NFR BLD: 4 % (ref 0–8)
EOSINOPHIL NFR BLD: 4.1 % (ref 0–8)
EP: 4
EP: 4
ERYTHROCYTE [DISTWIDTH] IN BLOOD BY AUTOMATED COUNT: 13.7 % (ref 11.5–14.5)
ERYTHROCYTE [DISTWIDTH] IN BLOOD BY AUTOMATED COUNT: 13.9 % (ref 11.5–14.5)
ERYTHROCYTE [DISTWIDTH] IN BLOOD BY AUTOMATED COUNT: 13.9 % (ref 11.5–14.5)
ERYTHROCYTE [DISTWIDTH] IN BLOOD BY AUTOMATED COUNT: 14 % (ref 11.5–14.5)
ERYTHROCYTE [DISTWIDTH] IN BLOOD BY AUTOMATED COUNT: 14.1 % (ref 11.5–14.5)
ERYTHROCYTE [DISTWIDTH] IN BLOOD BY AUTOMATED COUNT: 14.2 % (ref 11.5–14.5)
ERYTHROCYTE [DISTWIDTH] IN BLOOD BY AUTOMATED COUNT: 14.3 % (ref 11.5–14.5)
ERYTHROCYTE [DISTWIDTH] IN BLOOD BY AUTOMATED COUNT: 14.3 % (ref 11.5–14.5)
ERYTHROCYTE [DISTWIDTH] IN BLOOD BY AUTOMATED COUNT: 14.4 % (ref 11.5–14.5)
ERYTHROCYTE [DISTWIDTH] IN BLOOD BY AUTOMATED COUNT: 14.5 % (ref 11.5–14.5)
ERYTHROCYTE [DISTWIDTH] IN BLOOD BY AUTOMATED COUNT: 14.5 % (ref 11.5–14.5)
ERYTHROCYTE [DISTWIDTH] IN BLOOD BY AUTOMATED COUNT: 14.6 % (ref 11.5–14.5)
ERYTHROCYTE [DISTWIDTH] IN BLOOD BY AUTOMATED COUNT: 14.6 % (ref 11.5–14.5)
ERYTHROCYTE [DISTWIDTH] IN BLOOD BY AUTOMATED COUNT: 14.7 % (ref 11.5–14.5)
ERYTHROCYTE [DISTWIDTH] IN BLOOD BY AUTOMATED COUNT: 14.8 % (ref 11.5–14.5)
ERYTHROCYTE [DISTWIDTH] IN BLOOD BY AUTOMATED COUNT: 14.9 % (ref 11.5–14.5)
ERYTHROCYTE [DISTWIDTH] IN BLOOD BY AUTOMATED COUNT: 15 % (ref 11.5–14.5)
ERYTHROCYTE [DISTWIDTH] IN BLOOD BY AUTOMATED COUNT: 15.1 % (ref 11.5–14.5)
ERYTHROCYTE [DISTWIDTH] IN BLOOD BY AUTOMATED COUNT: 15.2 % (ref 11.5–14.5)
ERYTHROCYTE [DISTWIDTH] IN BLOOD BY AUTOMATED COUNT: 15.3 % (ref 11.5–14.5)
ERYTHROCYTE [DISTWIDTH] IN BLOOD BY AUTOMATED COUNT: 15.4 % (ref 11.5–14.5)
ERYTHROCYTE [DISTWIDTH] IN BLOOD BY AUTOMATED COUNT: 15.6 % (ref 11.5–14.5)
ERYTHROCYTE [DISTWIDTH] IN BLOOD BY AUTOMATED COUNT: 15.6 % (ref 11.5–14.5)
ERYTHROCYTE [DISTWIDTH] IN BLOOD BY AUTOMATED COUNT: 15.7 % (ref 11.5–14.5)
ERYTHROCYTE [DISTWIDTH] IN BLOOD BY AUTOMATED COUNT: 15.9 % (ref 11.5–14.5)
ERYTHROCYTE [DISTWIDTH] IN BLOOD BY AUTOMATED COUNT: 16.1 % (ref 11.5–14.5)
ERYTHROCYTE [DISTWIDTH] IN BLOOD BY AUTOMATED COUNT: 16.2 % (ref 11.5–14.5)
ERYTHROCYTE [DISTWIDTH] IN BLOOD BY AUTOMATED COUNT: 16.3 % (ref 11.5–14.5)
ERYTHROCYTE [DISTWIDTH] IN BLOOD BY AUTOMATED COUNT: 16.4 % (ref 11.5–14.5)
ERYTHROCYTE [DISTWIDTH] IN BLOOD BY AUTOMATED COUNT: 16.5 % (ref 11.5–14.5)
ERYTHROCYTE [DISTWIDTH] IN BLOOD BY AUTOMATED COUNT: 16.6 % (ref 11.5–14.5)
ERYTHROCYTE [DISTWIDTH] IN BLOOD BY AUTOMATED COUNT: 16.9 % (ref 11.5–14.5)
ERYTHROCYTE [DISTWIDTH] IN BLOOD BY AUTOMATED COUNT: 17.2 % (ref 11.5–14.5)
ERYTHROCYTE [DISTWIDTH] IN BLOOD BY AUTOMATED COUNT: 17.2 % (ref 11.5–14.5)
ERYTHROCYTE [DISTWIDTH] IN BLOOD BY AUTOMATED COUNT: 17.3 % (ref 11.5–14.5)
ERYTHROCYTE [DISTWIDTH] IN BLOOD BY AUTOMATED COUNT: 17.4 % (ref 11.5–14.5)
ERYTHROCYTE [DISTWIDTH] IN BLOOD BY AUTOMATED COUNT: 17.5 % (ref 11.5–14.5)
ERYTHROCYTE [DISTWIDTH] IN BLOOD BY AUTOMATED COUNT: 17.7 % (ref 11.5–14.5)
ERYTHROCYTE [DISTWIDTH] IN BLOOD BY AUTOMATED COUNT: 17.7 % (ref 11.5–14.5)
ERYTHROCYTE [DISTWIDTH] IN BLOOD BY AUTOMATED COUNT: 17.8 % (ref 11.5–14.5)
ERYTHROCYTE [DISTWIDTH] IN BLOOD BY AUTOMATED COUNT: 17.9 % (ref 11.5–14.5)
ERYTHROCYTE [DISTWIDTH] IN BLOOD BY AUTOMATED COUNT: 18 % (ref 11.5–14.5)
ERYTHROCYTE [DISTWIDTH] IN BLOOD BY AUTOMATED COUNT: 18.3 % (ref 11.5–14.5)
ERYTHROCYTE [DISTWIDTH] IN BLOOD BY AUTOMATED COUNT: 18.4 % (ref 11.5–14.5)
ERYTHROCYTE [DISTWIDTH] IN BLOOD BY AUTOMATED COUNT: 18.4 % (ref 11.5–14.5)
ERYTHROCYTE [DISTWIDTH] IN BLOOD BY AUTOMATED COUNT: 18.5 % (ref 11.5–14.5)
ERYTHROCYTE [DISTWIDTH] IN BLOOD BY AUTOMATED COUNT: 18.6 % (ref 11.5–14.5)
ERYTHROCYTE [DISTWIDTH] IN BLOOD BY AUTOMATED COUNT: 18.8 % (ref 11.5–14.5)
ERYTHROCYTE [DISTWIDTH] IN BLOOD BY AUTOMATED COUNT: 18.8 % (ref 11.5–14.5)
ERYTHROCYTE [SEDIMENTATION RATE] IN BLOOD BY WESTERGREN METHOD: 20 MM/H
ERYTHROCYTE [SEDIMENTATION RATE] IN BLOOD BY WESTERGREN METHOD: 20 MM/H
ERYTHROCYTE [SEDIMENTATION RATE] IN BLOOD BY WESTERGREN METHOD: 25 MM/H
ERYTHROCYTE [SEDIMENTATION RATE] IN BLOOD BY WESTERGREN METHOD: 25 MM/H
ERYTHROCYTE [SEDIMENTATION RATE] IN BLOOD BY WESTERGREN METHOD: 32 MM/H
ERYTHROCYTE [SEDIMENTATION RATE] IN BLOOD BY WESTERGREN METHOD: 32 MM/H
ERYTHROCYTE [SEDIMENTATION RATE] IN BLOOD BY WESTERGREN METHOD: 34 MM/H
ESCHERICHIA COLI: NOT DETECTED
EST. GFR  (NO RACE VARIABLE): 10.2 ML/MIN/1.73 M^2
EST. GFR  (NO RACE VARIABLE): 10.5 ML/MIN/1.73 M^2
EST. GFR  (NO RACE VARIABLE): 10.7 ML/MIN/1.73 M^2
EST. GFR  (NO RACE VARIABLE): 11 ML/MIN/1.73 M^2
EST. GFR  (NO RACE VARIABLE): 11.2 ML/MIN/1.73 M^2
EST. GFR  (NO RACE VARIABLE): 11.9 ML/MIN/1.73 M^2
EST. GFR  (NO RACE VARIABLE): 12 ML/MIN/1.73 M^2
EST. GFR  (NO RACE VARIABLE): 12.3 ML/MIN/1.73 M^2
EST. GFR  (NO RACE VARIABLE): 12.3 ML/MIN/1.73 M^2
EST. GFR  (NO RACE VARIABLE): 12.6 ML/MIN/1.73 M^2
EST. GFR  (NO RACE VARIABLE): 12.9 ML/MIN/1.73 M^2
EST. GFR  (NO RACE VARIABLE): 13.1 ML/MIN/1.73 M^2
EST. GFR  (NO RACE VARIABLE): 13.1 ML/MIN/1.73 M^2
EST. GFR  (NO RACE VARIABLE): 13.2 ML/MIN/1.73 M^2
EST. GFR  (NO RACE VARIABLE): 13.5 ML/MIN/1.73 M^2
EST. GFR  (NO RACE VARIABLE): 13.8 ML/MIN/1.73 M^2
EST. GFR  (NO RACE VARIABLE): 13.9 ML/MIN/1.73 M^2
EST. GFR  (NO RACE VARIABLE): 14 ML/MIN/1.73 M^2
EST. GFR  (NO RACE VARIABLE): 14.3 ML/MIN/1.73 M^2
EST. GFR  (NO RACE VARIABLE): 14.6 ML/MIN/1.73 M^2
EST. GFR  (NO RACE VARIABLE): 14.7 ML/MIN/1.73 M^2
EST. GFR  (NO RACE VARIABLE): 14.7 ML/MIN/1.73 M^2
EST. GFR  (NO RACE VARIABLE): 15 ML/MIN/1.73 M^2
EST. GFR  (NO RACE VARIABLE): 15.4 ML/MIN/1.73 M^2
EST. GFR  (NO RACE VARIABLE): 15.5 ML/MIN/1.73 M^2
EST. GFR  (NO RACE VARIABLE): 15.9 ML/MIN/1.73 M^2
EST. GFR  (NO RACE VARIABLE): 16.3 ML/MIN/1.73 M^2
EST. GFR  (NO RACE VARIABLE): 16.3 ML/MIN/1.73 M^2
EST. GFR  (NO RACE VARIABLE): 16.4 ML/MIN/1.73 M^2
EST. GFR  (NO RACE VARIABLE): 16.8 ML/MIN/1.73 M^2
EST. GFR  (NO RACE VARIABLE): 16.9 ML/MIN/1.73 M^2
EST. GFR  (NO RACE VARIABLE): 16.9 ML/MIN/1.73 M^2
EST. GFR  (NO RACE VARIABLE): 17.4 ML/MIN/1.73 M^2
EST. GFR  (NO RACE VARIABLE): 17.5 ML/MIN/1.73 M^2
EST. GFR  (NO RACE VARIABLE): 17.9 ML/MIN/1.73 M^2
EST. GFR  (NO RACE VARIABLE): 18 ML/MIN/1.73 M^2
EST. GFR  (NO RACE VARIABLE): 18.5 ML/MIN/1.73 M^2
EST. GFR  (NO RACE VARIABLE): 18.6 ML/MIN/1.73 M^2
EST. GFR  (NO RACE VARIABLE): 19.2 ML/MIN/1.73 M^2
EST. GFR  (NO RACE VARIABLE): 19.2 ML/MIN/1.73 M^2
EST. GFR  (NO RACE VARIABLE): 19.3 ML/MIN/1.73 M^2
EST. GFR  (NO RACE VARIABLE): 20 ML/MIN/1.73 M^2
EST. GFR  (NO RACE VARIABLE): 20 ML/MIN/1.73 M^2
EST. GFR  (NO RACE VARIABLE): 20.7 ML/MIN/1.73 M^2
EST. GFR  (NO RACE VARIABLE): 21.3 ML/MIN/1.73 M^2
EST. GFR  (NO RACE VARIABLE): 21.3 ML/MIN/1.73 M^2
EST. GFR  (NO RACE VARIABLE): 21.5 ML/MIN/1.73 M^2
EST. GFR  (NO RACE VARIABLE): 21.5 ML/MIN/1.73 M^2
EST. GFR  (NO RACE VARIABLE): 22.2 ML/MIN/1.73 M^2
EST. GFR  (NO RACE VARIABLE): 22.3 ML/MIN/1.73 M^2
EST. GFR  (NO RACE VARIABLE): 23.1 ML/MIN/1.73 M^2
EST. GFR  (NO RACE VARIABLE): 23.2 ML/MIN/1.73 M^2
EST. GFR  (NO RACE VARIABLE): 24 ML/MIN/1.73 M^2
EST. GFR  (NO RACE VARIABLE): 25 ML/MIN/1.73 M^2
EST. GFR  (NO RACE VARIABLE): 25 ML/MIN/1.73 M^2
EST. GFR  (NO RACE VARIABLE): 25.2 ML/MIN/1.73 M^2
EST. GFR  (NO RACE VARIABLE): 25.2 ML/MIN/1.73 M^2
EST. GFR  (NO RACE VARIABLE): 26.2 ML/MIN/1.73 M^2
EST. GFR  (NO RACE VARIABLE): 26.2 ML/MIN/1.73 M^2
EST. GFR  (NO RACE VARIABLE): 26.3 ML/MIN/1.73 M^2
EST. GFR  (NO RACE VARIABLE): 27.4 ML/MIN/1.73 M^2
EST. GFR  (NO RACE VARIABLE): 27.5 ML/MIN/1.73 M^2
EST. GFR  (NO RACE VARIABLE): 30.3 ML/MIN/1.73 M^2
EST. GFR  (NO RACE VARIABLE): 31.7 ML/MIN/1.73 M^2
EST. GFR  (NO RACE VARIABLE): 33.5 ML/MIN/1.73 M^2
EST. GFR  (NO RACE VARIABLE): 33.7 ML/MIN/1.73 M^2
EST. GFR  (NO RACE VARIABLE): 37.5 ML/MIN/1.73 M^2
EST. GFR  (NO RACE VARIABLE): 49.3 ML/MIN/1.73 M^2
EST. GFR  (NO RACE VARIABLE): 53 ML/MIN/1.73 M^2
EST. GFR  (NO RACE VARIABLE): 57.5 ML/MIN/1.73 M^2
EST. GFR  (NO RACE VARIABLE): >60 ML/MIN/1.73 M^2
ESTIMATED AVG GLUCOSE: 194 MG/DL (ref 68–131)
ESTIMATED AVG GLUCOSE: 226 MG/DL (ref 68–131)
ESTIMATED AVG GLUCOSE: 260 MG/DL (ref 68–131)
ESTIMATED AVG GLUCOSE: 260 MG/DL (ref 68–131)
ETHANOL SERPL QL SCN: NEGATIVE
ETHANOL SERPL-MCNC: <10 MG/DL
FERRITIN SERPL-MCNC: 1455 NG/ML (ref 20–300)
FERRITIN SERPL-MCNC: 1803 NG/ML (ref 20–300)
FINAL PATHOLOGIC DIAGNOSIS: NORMAL
FIO2: 100
FIO2: 21
FIO2: 30
FIO2: 40
FIO2: 60
FIO2: 60
FIO2: 70
FLOW: 15
FLOW: 4
FRACTIONAL SHORTENING: 15 % (ref 28–44)
FRACTIONAL SHORTENING: 25 % (ref 28–44)
FRACTIONAL SHORTENING: 28 % (ref 28–44)
GIANT PLATELETS BLD QL SMEAR: PRESENT
GLUCOSE SERPL-MCNC: 101 MG/DL (ref 70–110)
GLUCOSE SERPL-MCNC: 102 MG/DL (ref 70–110)
GLUCOSE SERPL-MCNC: 103 MG/DL (ref 70–110)
GLUCOSE SERPL-MCNC: 107 MG/DL (ref 70–110)
GLUCOSE SERPL-MCNC: 107 MG/DL (ref 70–110)
GLUCOSE SERPL-MCNC: 109 MG/DL (ref 70–110)
GLUCOSE SERPL-MCNC: 110 MG/DL (ref 70–110)
GLUCOSE SERPL-MCNC: 114 MG/DL (ref 70–110)
GLUCOSE SERPL-MCNC: 116 MG/DL (ref 70–110)
GLUCOSE SERPL-MCNC: 118 MG/DL (ref 70–110)
GLUCOSE SERPL-MCNC: 121 MG/DL (ref 70–110)
GLUCOSE SERPL-MCNC: 123 MG/DL (ref 70–110)
GLUCOSE SERPL-MCNC: 127 MG/DL (ref 70–110)
GLUCOSE SERPL-MCNC: 128 MG/DL (ref 70–110)
GLUCOSE SERPL-MCNC: 130 MG/DL (ref 70–110)
GLUCOSE SERPL-MCNC: 131 MG/DL (ref 70–110)
GLUCOSE SERPL-MCNC: 133 MG/DL (ref 70–110)
GLUCOSE SERPL-MCNC: 134 MG/DL (ref 70–110)
GLUCOSE SERPL-MCNC: 136 MG/DL (ref 70–110)
GLUCOSE SERPL-MCNC: 151 MG/DL (ref 70–110)
GLUCOSE SERPL-MCNC: 152 MG/DL (ref 70–110)
GLUCOSE SERPL-MCNC: 157 MG/DL (ref 70–110)
GLUCOSE SERPL-MCNC: 157 MG/DL (ref 70–110)
GLUCOSE SERPL-MCNC: 159 MG/DL (ref 70–110)
GLUCOSE SERPL-MCNC: 161 MG/DL (ref 70–110)
GLUCOSE SERPL-MCNC: 161 MG/DL (ref 70–110)
GLUCOSE SERPL-MCNC: 162 MG/DL (ref 70–110)
GLUCOSE SERPL-MCNC: 162 MG/DL (ref 70–110)
GLUCOSE SERPL-MCNC: 165 MG/DL (ref 70–110)
GLUCOSE SERPL-MCNC: 165 MG/DL (ref 70–110)
GLUCOSE SERPL-MCNC: 166 MG/DL (ref 70–110)
GLUCOSE SERPL-MCNC: 168 MG/DL (ref 70–110)
GLUCOSE SERPL-MCNC: 169 MG/DL (ref 70–110)
GLUCOSE SERPL-MCNC: 173 MG/DL (ref 70–110)
GLUCOSE SERPL-MCNC: 176 MG/DL (ref 70–110)
GLUCOSE SERPL-MCNC: 176 MG/DL (ref 70–110)
GLUCOSE SERPL-MCNC: 178 MG/DL (ref 70–110)
GLUCOSE SERPL-MCNC: 184 MG/DL (ref 70–110)
GLUCOSE SERPL-MCNC: 190 MG/DL (ref 70–110)
GLUCOSE SERPL-MCNC: 194 MG/DL (ref 70–110)
GLUCOSE SERPL-MCNC: 196 MG/DL (ref 70–110)
GLUCOSE SERPL-MCNC: 197 MG/DL (ref 70–110)
GLUCOSE SERPL-MCNC: 197 MG/DL (ref 70–110)
GLUCOSE SERPL-MCNC: 205 MG/DL (ref 70–110)
GLUCOSE SERPL-MCNC: 210 MG/DL (ref 70–110)
GLUCOSE SERPL-MCNC: 210 MG/DL (ref 70–110)
GLUCOSE SERPL-MCNC: 219 MG/DL (ref 70–110)
GLUCOSE SERPL-MCNC: 222 MG/DL (ref 70–110)
GLUCOSE SERPL-MCNC: 225 MG/DL (ref 70–110)
GLUCOSE SERPL-MCNC: 226 MG/DL (ref 70–110)
GLUCOSE SERPL-MCNC: 231 MG/DL (ref 70–110)
GLUCOSE SERPL-MCNC: 235 MG/DL (ref 70–110)
GLUCOSE SERPL-MCNC: 237 MG/DL (ref 70–110)
GLUCOSE SERPL-MCNC: 238 MG/DL (ref 70–110)
GLUCOSE SERPL-MCNC: 243 MG/DL (ref 70–110)
GLUCOSE SERPL-MCNC: 244 MG/DL (ref 70–110)
GLUCOSE SERPL-MCNC: 244 MG/DL (ref 70–110)
GLUCOSE SERPL-MCNC: 249 MG/DL (ref 70–110)
GLUCOSE SERPL-MCNC: 272 MG/DL (ref 70–110)
GLUCOSE SERPL-MCNC: 282 MG/DL (ref 70–110)
GLUCOSE SERPL-MCNC: 289 MG/DL (ref 70–110)
GLUCOSE SERPL-MCNC: 290 MG/DL (ref 70–110)
GLUCOSE SERPL-MCNC: 296 MG/DL (ref 70–110)
GLUCOSE SERPL-MCNC: 297 MG/DL (ref 70–110)
GLUCOSE SERPL-MCNC: 299 MG/DL (ref 70–110)
GLUCOSE SERPL-MCNC: 300 MG/DL (ref 70–110)
GLUCOSE SERPL-MCNC: 304 MG/DL (ref 70–110)
GLUCOSE SERPL-MCNC: 362 MG/DL (ref 70–110)
GLUCOSE SERPL-MCNC: 366 MG/DL (ref 70–110)
GLUCOSE SERPL-MCNC: 387 MG/DL (ref 70–110)
GLUCOSE SERPL-MCNC: 390 MG/DL (ref 70–110)
GLUCOSE SERPL-MCNC: 402 MG/DL (ref 70–110)
GLUCOSE SERPL-MCNC: 41 MG/DL (ref 70–110)
GLUCOSE SERPL-MCNC: 43 MG/DL (ref 70–110)
GLUCOSE SERPL-MCNC: 44 MG/DL (ref 70–110)
GLUCOSE SERPL-MCNC: 462 MG/DL (ref 70–110)
GLUCOSE SERPL-MCNC: 47 MG/DL (ref 70–110)
GLUCOSE SERPL-MCNC: 474 MG/DL (ref 70–110)
GLUCOSE SERPL-MCNC: 479 MG/DL (ref 70–110)
GLUCOSE SERPL-MCNC: 482 MG/DL (ref 70–110)
GLUCOSE SERPL-MCNC: 499 MG/DL (ref 70–110)
GLUCOSE SERPL-MCNC: 51 MG/DL (ref 70–110)
GLUCOSE SERPL-MCNC: 512 MG/DL (ref 70–110)
GLUCOSE SERPL-MCNC: 53 MG/DL (ref 70–110)
GLUCOSE SERPL-MCNC: 557 MG/DL (ref 70–110)
GLUCOSE SERPL-MCNC: 592 MG/DL (ref 70–110)
GLUCOSE SERPL-MCNC: 60 MG/DL (ref 70–110)
GLUCOSE SERPL-MCNC: 61 MG/DL (ref 70–110)
GLUCOSE SERPL-MCNC: 628 MG/DL (ref 70–110)
GLUCOSE SERPL-MCNC: 634 MG/DL (ref 70–110)
GLUCOSE SERPL-MCNC: 64 MG/DL (ref 70–110)
GLUCOSE SERPL-MCNC: 658 MG/DL (ref 70–110)
GLUCOSE SERPL-MCNC: 66 MG/DL (ref 70–110)
GLUCOSE SERPL-MCNC: 66 MG/DL (ref 70–110)
GLUCOSE SERPL-MCNC: 672 MG/DL (ref 70–110)
GLUCOSE SERPL-MCNC: 686 MG/DL (ref 70–110)
GLUCOSE SERPL-MCNC: 691 MG/DL (ref 70–110)
GLUCOSE SERPL-MCNC: 71 MG/DL (ref 70–110)
GLUCOSE SERPL-MCNC: 71 MG/DL (ref 70–110)
GLUCOSE SERPL-MCNC: 73 MG/DL (ref 70–110)
GLUCOSE SERPL-MCNC: 73 MG/DL (ref 70–110)
GLUCOSE SERPL-MCNC: 759 MG/DL (ref 70–110)
GLUCOSE SERPL-MCNC: 77 MG/DL (ref 70–110)
GLUCOSE SERPL-MCNC: 784 MG/DL (ref 70–110)
GLUCOSE SERPL-MCNC: 797 MG/DL (ref 70–110)
GLUCOSE SERPL-MCNC: 81 MG/DL (ref 70–110)
GLUCOSE SERPL-MCNC: 815 MG/DL (ref 70–110)
GLUCOSE SERPL-MCNC: 82 MG/DL (ref 70–110)
GLUCOSE SERPL-MCNC: 83 MG/DL (ref 70–110)
GLUCOSE SERPL-MCNC: 87 MG/DL (ref 70–110)
GLUCOSE SERPL-MCNC: 872 MG/DL (ref 70–110)
GLUCOSE SERPL-MCNC: 89 MG/DL (ref 70–110)
GLUCOSE SERPL-MCNC: 90 MG/DL (ref 70–110)
GLUCOSE SERPL-MCNC: 91 MG/DL (ref 70–110)
GLUCOSE SERPL-MCNC: 92 MG/DL (ref 70–110)
GLUCOSE SERPL-MCNC: 92 MG/DL (ref 70–110)
GLUCOSE SERPL-MCNC: 93 MG/DL (ref 70–110)
GLUCOSE SERPL-MCNC: 94 MG/DL (ref 70–110)
GLUCOSE SERPL-MCNC: 95 MG/DL (ref 70–110)
GLUCOSE SERPL-MCNC: 96 MG/DL (ref 70–110)
GLUCOSE SERPL-MCNC: 97 MG/DL (ref 70–110)
GLUCOSE SERPL-MCNC: 98 MG/DL (ref 70–110)
GLUCOSE SERPL-MCNC: >700 MG/DL (ref 70–110)
GLUCOSE SERPL-MCNC: >700 MG/DL (ref 70–110)
GRAM STN SPEC: NORMAL
GRAM STN SPEC: NORMAL
HAEMOPHILUS INFLUENZAE: NOT DETECTED
HBA1C MFR BLD: 10.7 % (ref 4–5.6)
HBA1C MFR BLD: 10.7 % (ref 4–5.6)
HBA1C MFR BLD: 8.4 % (ref 4–5.6)
HBA1C MFR BLD: 9.5 % (ref 4–5.6)
HBV CORE AB SERPL QL IA: NORMAL
HBV CORE AB SERPL QL IA: NORMAL
HBV SURFACE AB SER QL IA: POSITIVE
HBV SURFACE AB SER-ACNC: 321.36 MIU/ML
HBV SURFACE AB SER-ACNC: 370.67 MIU/ML
HBV SURFACE AB SER-ACNC: REACTIVE M[IU]/ML
HBV SURFACE AB SER-ACNC: REACTIVE M[IU]/ML
HBV SURFACE AB SERPL IA-ACNC: 154 MIU/ML
HBV SURFACE AG SERPL QL IA: NORMAL
HCO3 UR-SCNC: 12.9 MMOL/L (ref 24–28)
HCO3 UR-SCNC: 15.9 MMOL/L (ref 24–28)
HCO3 UR-SCNC: 17.6 MMOL/L (ref 24–28)
HCO3 UR-SCNC: 17.7 MMOL/L (ref 24–28)
HCO3 UR-SCNC: 19.5 MMOL/L (ref 24–28)
HCO3 UR-SCNC: 19.5 MMOL/L (ref 24–28)
HCO3 UR-SCNC: 21 MMOL/L (ref 24–28)
HCO3 UR-SCNC: 21.3 MMOL/L (ref 24–28)
HCO3 UR-SCNC: 21.3 MMOL/L (ref 24–28)
HCO3 UR-SCNC: 22.2 MMOL/L (ref 24–28)
HCO3 UR-SCNC: 24 MMOL/L (ref 24–28)
HCO3 UR-SCNC: 25.7 MMOL/L (ref 24–28)
HCO3 UR-SCNC: 26.2 MMOL/L (ref 24–28)
HCO3 UR-SCNC: 26.5 MMOL/L (ref 24–28)
HCO3 UR-SCNC: 26.7 MMOL/L (ref 24–28)
HCO3 UR-SCNC: 28.1 MMOL/L (ref 24–28)
HCO3 UR-SCNC: 28.2 MMOL/L (ref 24–28)
HCO3 UR-SCNC: 29.6 MMOL/L (ref 24–28)
HCO3 UR-SCNC: 30.2 MMOL/L (ref 24–28)
HCO3 UR-SCNC: 30.9 MMOL/L (ref 24–28)
HCO3 UR-SCNC: 31.4 MMOL/L (ref 24–28)
HCO3 UR-SCNC: 32.1 MMOL/L (ref 24–28)
HCO3 UR-SCNC: 32.6 MMOL/L (ref 24–28)
HCO3 UR-SCNC: 34 MMOL/L (ref 24–28)
HCO3 UR-SCNC: 35.1 MMOL/L (ref 24–28)
HCT VFR BLD AUTO: 21 % (ref 40–54)
HCT VFR BLD AUTO: 21.3 % (ref 40–54)
HCT VFR BLD AUTO: 21.6 % (ref 40–54)
HCT VFR BLD AUTO: 22.2 % (ref 40–54)
HCT VFR BLD AUTO: 22.3 % (ref 40–54)
HCT VFR BLD AUTO: 22.7 % (ref 40–54)
HCT VFR BLD AUTO: 22.8 % (ref 40–54)
HCT VFR BLD AUTO: 23 % (ref 40–54)
HCT VFR BLD AUTO: 23.1 % (ref 40–54)
HCT VFR BLD AUTO: 23.3 % (ref 40–54)
HCT VFR BLD AUTO: 23.4 % (ref 40–54)
HCT VFR BLD AUTO: 23.5 % (ref 40–54)
HCT VFR BLD AUTO: 23.5 % (ref 40–54)
HCT VFR BLD AUTO: 23.6 % (ref 40–54)
HCT VFR BLD AUTO: 23.8 % (ref 40–54)
HCT VFR BLD AUTO: 23.9 % (ref 40–54)
HCT VFR BLD AUTO: 23.9 % (ref 40–54)
HCT VFR BLD AUTO: 24.2 % (ref 40–54)
HCT VFR BLD AUTO: 24.3 % (ref 40–54)
HCT VFR BLD AUTO: 24.4 % (ref 40–54)
HCT VFR BLD AUTO: 24.6 % (ref 40–54)
HCT VFR BLD AUTO: 24.7 % (ref 40–54)
HCT VFR BLD AUTO: 24.8 % (ref 40–54)
HCT VFR BLD AUTO: 24.9 % (ref 40–54)
HCT VFR BLD AUTO: 25.5 % (ref 40–54)
HCT VFR BLD AUTO: 25.7 % (ref 40–54)
HCT VFR BLD AUTO: 25.8 % (ref 40–54)
HCT VFR BLD AUTO: 25.8 % (ref 40–54)
HCT VFR BLD AUTO: 25.9 % (ref 40–54)
HCT VFR BLD AUTO: 26 % (ref 40–54)
HCT VFR BLD AUTO: 26.1 % (ref 40–54)
HCT VFR BLD AUTO: 26.3 % (ref 40–54)
HCT VFR BLD AUTO: 26.3 % (ref 40–54)
HCT VFR BLD AUTO: 26.5 % (ref 40–54)
HCT VFR BLD AUTO: 26.6 % (ref 40–54)
HCT VFR BLD AUTO: 26.9 % (ref 40–54)
HCT VFR BLD AUTO: 26.9 % (ref 40–54)
HCT VFR BLD AUTO: 27 % (ref 40–54)
HCT VFR BLD AUTO: 27 % (ref 40–54)
HCT VFR BLD AUTO: 27.3 % (ref 40–54)
HCT VFR BLD AUTO: 27.5 % (ref 40–54)
HCT VFR BLD AUTO: 27.6 % (ref 40–54)
HCT VFR BLD AUTO: 27.6 % (ref 40–54)
HCT VFR BLD AUTO: 27.7 % (ref 40–54)
HCT VFR BLD AUTO: 27.9 % (ref 40–54)
HCT VFR BLD AUTO: 28 % (ref 40–54)
HCT VFR BLD AUTO: 28.5 % (ref 40–54)
HCT VFR BLD AUTO: 28.6 % (ref 40–54)
HCT VFR BLD AUTO: 28.7 % (ref 40–54)
HCT VFR BLD AUTO: 28.7 % (ref 40–54)
HCT VFR BLD AUTO: 28.8 % (ref 40–54)
HCT VFR BLD AUTO: 29.1 % (ref 40–54)
HCT VFR BLD AUTO: 29.1 % (ref 40–54)
HCT VFR BLD AUTO: 29.3 % (ref 40–54)
HCT VFR BLD AUTO: 29.4 % (ref 40–54)
HCT VFR BLD AUTO: 29.5 % (ref 40–54)
HCT VFR BLD AUTO: 29.6 % (ref 40–54)
HCT VFR BLD AUTO: 29.7 % (ref 40–54)
HCT VFR BLD AUTO: 29.9 % (ref 40–54)
HCT VFR BLD AUTO: 30 % (ref 40–54)
HCT VFR BLD AUTO: 30.2 % (ref 40–54)
HCT VFR BLD AUTO: 30.2 % (ref 40–54)
HCT VFR BLD AUTO: 30.6 % (ref 40–54)
HCT VFR BLD AUTO: 30.9 % (ref 40–54)
HCT VFR BLD AUTO: 31.1 % (ref 40–54)
HCT VFR BLD AUTO: 31.9 % (ref 40–54)
HCT VFR BLD AUTO: 31.9 % (ref 40–54)
HCT VFR BLD AUTO: 32 % (ref 40–54)
HCT VFR BLD AUTO: 32.6 % (ref 40–54)
HCT VFR BLD AUTO: 32.6 % (ref 40–54)
HCT VFR BLD AUTO: 32.9 % (ref 40–54)
HCT VFR BLD AUTO: 33 % (ref 40–54)
HCT VFR BLD AUTO: 33.9 % (ref 40–54)
HCT VFR BLD AUTO: 33.9 % (ref 40–54)
HCT VFR BLD AUTO: 34 % (ref 40–54)
HCT VFR BLD AUTO: 34.1 % (ref 40–54)
HCT VFR BLD AUTO: 34.2 % (ref 40–54)
HCT VFR BLD AUTO: 34.5 % (ref 40–54)
HCT VFR BLD AUTO: 34.8 % (ref 40–54)
HCT VFR BLD AUTO: 35.2 % (ref 40–54)
HCT VFR BLD AUTO: 35.3 % (ref 40–54)
HCT VFR BLD AUTO: 35.6 % (ref 40–54)
HCT VFR BLD AUTO: 37.2 % (ref 40–54)
HCT VFR BLD AUTO: 38.5 % (ref 40–54)
HCT VFR BLD AUTO: 38.8 % (ref 40–54)
HCT VFR BLD AUTO: 40.2 % (ref 40–54)
HCT VFR BLD AUTO: 40.6 % (ref 40–54)
HCT VFR BLD AUTO: 42.4 % (ref 40–54)
HCT VFR BLD CALC: 23 %PCV (ref 36–54)
HCT VFR BLD CALC: 23 %PCV (ref 36–54)
HCT VFR BLD CALC: 24 %PCV (ref 36–54)
HCT VFR BLD CALC: 24 %PCV (ref 36–54)
HCT VFR BLD CALC: 25 %PCV (ref 36–54)
HCT VFR BLD CALC: 27 %PCV (ref 36–54)
HCT VFR BLD CALC: 29 %PCV (ref 36–54)
HCT VFR BLD CALC: 30 %PCV (ref 36–54)
HCT VFR BLD CALC: 30 %PCV (ref 36–54)
HCT VFR BLD CALC: 31 %PCV (ref 36–54)
HCT VFR BLD CALC: 32 %PCV (ref 36–54)
HCT VFR BLD CALC: 36 %PCV (ref 36–54)
HCT VFR BLD CALC: 36 %PCV (ref 36–54)
HCT VFR BLD CALC: 37 %PCV (ref 36–54)
HCT VFR BLD CALC: 42 %PCV (ref 36–54)
HDLC SERPL-MCNC: 34 MG/DL (ref 40–75)
HDLC SERPL: 48.6 % (ref 20–50)
HGB BLD-MCNC: 10 G/DL (ref 14–18)
HGB BLD-MCNC: 10.1 G/DL (ref 14–18)
HGB BLD-MCNC: 10.2 G/DL (ref 14–18)
HGB BLD-MCNC: 10.3 G/DL (ref 14–18)
HGB BLD-MCNC: 10.3 G/DL (ref 14–18)
HGB BLD-MCNC: 10.6 G/DL (ref 14–18)
HGB BLD-MCNC: 10.7 G/DL (ref 14–18)
HGB BLD-MCNC: 10.8 G/DL (ref 14–18)
HGB BLD-MCNC: 10.9 G/DL (ref 14–18)
HGB BLD-MCNC: 11 G/DL (ref 14–18)
HGB BLD-MCNC: 11.1 G/DL (ref 14–18)
HGB BLD-MCNC: 11.1 G/DL (ref 14–18)
HGB BLD-MCNC: 11.2 G/DL (ref 14–18)
HGB BLD-MCNC: 11.2 G/DL (ref 14–18)
HGB BLD-MCNC: 11.4 G/DL (ref 14–18)
HGB BLD-MCNC: 12.3 G/DL (ref 14–18)
HGB BLD-MCNC: 12.4 G/DL (ref 14–18)
HGB BLD-MCNC: 12.8 G/DL (ref 14–18)
HGB BLD-MCNC: 13.1 G/DL (ref 14–18)
HGB BLD-MCNC: 13.4 G/DL (ref 14–18)
HGB BLD-MCNC: 6.6 G/DL (ref 14–18)
HGB BLD-MCNC: 6.9 G/DL (ref 14–18)
HGB BLD-MCNC: 7 G/DL (ref 14–18)
HGB BLD-MCNC: 7.2 G/DL (ref 14–18)
HGB BLD-MCNC: 7.4 G/DL (ref 14–18)
HGB BLD-MCNC: 7.4 G/DL (ref 14–18)
HGB BLD-MCNC: 7.5 G/DL (ref 14–18)
HGB BLD-MCNC: 7.6 G/DL (ref 14–18)
HGB BLD-MCNC: 7.7 G/DL (ref 14–18)
HGB BLD-MCNC: 7.8 G/DL (ref 14–18)
HGB BLD-MCNC: 7.9 G/DL (ref 14–18)
HGB BLD-MCNC: 8 G/DL (ref 14–18)
HGB BLD-MCNC: 8.1 G/DL (ref 14–18)
HGB BLD-MCNC: 8.2 G/DL (ref 14–18)
HGB BLD-MCNC: 8.3 G/DL (ref 14–18)
HGB BLD-MCNC: 8.4 G/DL (ref 14–18)
HGB BLD-MCNC: 8.5 G/DL (ref 14–18)
HGB BLD-MCNC: 8.6 G/DL (ref 14–18)
HGB BLD-MCNC: 8.7 G/DL (ref 14–18)
HGB BLD-MCNC: 8.9 G/DL (ref 14–18)
HGB BLD-MCNC: 9 G/DL (ref 14–18)
HGB BLD-MCNC: 9 G/DL (ref 14–18)
HGB BLD-MCNC: 9.1 G/DL (ref 14–18)
HGB BLD-MCNC: 9.1 G/DL (ref 14–18)
HGB BLD-MCNC: 9.2 G/DL (ref 14–18)
HGB BLD-MCNC: 9.3 G/DL (ref 14–18)
HGB BLD-MCNC: 9.4 G/DL (ref 14–18)
HGB BLD-MCNC: 9.5 G/DL (ref 14–18)
HGB BLD-MCNC: 9.5 G/DL (ref 14–18)
HGB BLD-MCNC: 9.8 G/DL (ref 14–18)
HGB BLD-MCNC: 9.8 G/DL (ref 14–18)
HGB BLD-MCNC: 9.9 G/DL (ref 14–18)
HOWELL-JOLLY BOD BLD QL SMEAR: ABNORMAL
HYPOCHROMIA BLD QL SMEAR: ABNORMAL
IMM GRANULOCYTES # BLD AUTO: 0 K/UL (ref 0–0.04)
IMM GRANULOCYTES # BLD AUTO: 0.01 K/UL (ref 0–0.04)
IMM GRANULOCYTES # BLD AUTO: 0.02 K/UL (ref 0–0.04)
IMM GRANULOCYTES # BLD AUTO: 0.03 K/UL (ref 0–0.04)
IMM GRANULOCYTES # BLD AUTO: 0.04 K/UL (ref 0–0.04)
IMM GRANULOCYTES # BLD AUTO: 0.05 K/UL (ref 0–0.04)
IMM GRANULOCYTES # BLD AUTO: 0.15 K/UL (ref 0–0.04)
IMM GRANULOCYTES # BLD AUTO: 0.16 K/UL (ref 0–0.04)
IMM GRANULOCYTES # BLD AUTO: 0.22 K/UL (ref 0–0.04)
IMM GRANULOCYTES # BLD AUTO: 0.34 K/UL (ref 0–0.04)
IMM GRANULOCYTES # BLD AUTO: 0.36 K/UL (ref 0–0.04)
IMM GRANULOCYTES # BLD AUTO: 0.38 K/UL (ref 0–0.04)
IMM GRANULOCYTES # BLD AUTO: ABNORMAL K/UL (ref 0–0.04)
IMM GRANULOCYTES NFR BLD AUTO: 0 % (ref 0–0.5)
IMM GRANULOCYTES NFR BLD AUTO: 0.2 % (ref 0–0.5)
IMM GRANULOCYTES NFR BLD AUTO: 0.3 % (ref 0–0.5)
IMM GRANULOCYTES NFR BLD AUTO: 0.4 % (ref 0–0.5)
IMM GRANULOCYTES NFR BLD AUTO: 0.5 % (ref 0–0.5)
IMM GRANULOCYTES NFR BLD AUTO: 0.6 % (ref 0–0.5)
IMM GRANULOCYTES NFR BLD AUTO: 0.7 % (ref 0–0.5)
IMM GRANULOCYTES NFR BLD AUTO: 0.7 % (ref 0–0.5)
IMM GRANULOCYTES NFR BLD AUTO: 0.8 % (ref 0–0.5)
IMM GRANULOCYTES NFR BLD AUTO: 1.6 % (ref 0–0.5)
IMM GRANULOCYTES NFR BLD AUTO: 3 % (ref 0–0.5)
IMM GRANULOCYTES NFR BLD AUTO: 3.2 % (ref 0–0.5)
IMM GRANULOCYTES NFR BLD AUTO: 3.3 % (ref 0–0.5)
IMM GRANULOCYTES NFR BLD AUTO: 4.1 % (ref 0–0.5)
IMM GRANULOCYTES NFR BLD AUTO: 4.9 % (ref 0–0.5)
IMM GRANULOCYTES NFR BLD AUTO: ABNORMAL % (ref 0–0.5)
IMP GENE: ABNORMAL
INFLUENZA A, MOLECULAR: NOT DETECTED
INFLUENZA B, MOLECULAR: NOT DETECTED
INR PPP: 1.3 (ref 0.8–1.2)
INR PPP: 1.4 (ref 0.8–1.2)
INR PPP: 1.5 (ref 0.8–1.2)
INR PPP: 2.4 (ref 0.8–1.2)
INSULIN COLLECTION INTERVAL: ABNORMAL
INSULIN SERPL-ACNC: 41.4 UU/ML
INTERVENTRICULAR SEPTUM: 0.93 CM (ref 0.6–1.1)
INTERVENTRICULAR SEPTUM: 1.25 CM (ref 0.6–1.1)
INTERVENTRICULAR SEPTUM: 1.3 CM (ref 0.6–1.1)
IP: 17
IP: 22
IRON SERPL-MCNC: 39 UG/DL (ref 45–160)
IRON SERPL-MCNC: 48 UG/DL (ref 45–160)
IVC DIAMETER: 2.2 CM
KLEBSIELLA AEROGENES: NOT DETECTED
KLEBSIELLA OXYTOCA: NOT DETECTED
KLEBSIELLA PNEUMONIAE GROUP: NOT DETECTED
KPC: ABNORMAL
LA MAJOR: 5.04 CM
LA MAJOR: 5.92 CM
LA MAJOR: 6.57 CM
LA MINOR: 4.96 CM
LA MINOR: 5.39 CM
LA MINOR: 6.13 CM
LA WIDTH: 3.62 CM
LA WIDTH: 4.32 CM
LA WIDTH: 4.8 CM
LABORATORY COMMENT REPORT: NORMAL
LABORATORY COMMENT REPORT: NORMAL
LACTATE SERPL-SCNC: 0.7 MMOL/L (ref 0.5–2.2)
LACTATE SERPL-SCNC: 1 MMOL/L (ref 0.5–2.2)
LACTATE SERPL-SCNC: 1 MMOL/L (ref 0.5–2.2)
LACTATE SERPL-SCNC: 1.2 MMOL/L (ref 0.5–2.2)
LACTATE SERPL-SCNC: 1.4 MMOL/L (ref 0.5–2.2)
LACTATE SERPL-SCNC: 3 MMOL/L (ref 0.5–2.2)
LACTATE SERPL-SCNC: 5.9 MMOL/L (ref 0.5–2.2)
LACTATE SERPL-SCNC: 6.6 MMOL/L (ref 0.5–2.2)
LDH FLD L TO P-CCNC: 103 U/L
LDH FLD L TO P-CCNC: 97 U/L
LDH SERPL L TO P-CCNC: 0.74 MMOL/L (ref 0.5–2.2)
LDH SERPL L TO P-CCNC: 1.64 MMOL/L (ref 0.5–2.2)
LDH SERPL L TO P-CCNC: 8 MMOL/L (ref 0.36–1.25)
LDLC SERPL CALC-MCNC: 30 MG/DL (ref 63–159)
LEFT ATRIUM SIZE: 3.29 CM
LEFT ATRIUM SIZE: 3.85 CM
LEFT ATRIUM SIZE: 4.21 CM
LEFT ATRIUM VOLUME INDEX MOD: 27.7 ML/M2
LEFT ATRIUM VOLUME INDEX MOD: 40.1 ML/M2
LEFT ATRIUM VOLUME INDEX: 28 ML/M2
LEFT ATRIUM VOLUME INDEX: 49 ML/M2
LEFT ATRIUM VOLUME INDEX: 52.1 ML/M2
LEFT ATRIUM VOLUME MOD: 50.09 CM3
LEFT ATRIUM VOLUME MOD: 74.57 CM3
LEFT ATRIUM VOLUME: 50.61 CM3
LEFT ATRIUM VOLUME: 89.66 CM3
LEFT ATRIUM VOLUME: 96.92 CM3
LEFT INTERNAL DIMENSION IN SYSTOLE: 3.23 CM (ref 2.1–4)
LEFT INTERNAL DIMENSION IN SYSTOLE: 3.39 CM (ref 2.1–4)
LEFT INTERNAL DIMENSION IN SYSTOLE: 4.01 CM (ref 2.1–4)
LEFT VENTRICLE DIASTOLIC VOLUME INDEX: 34.5 ML/M2
LEFT VENTRICLE DIASTOLIC VOLUME INDEX: 53.32 ML/M2
LEFT VENTRICLE DIASTOLIC VOLUME INDEX: 74.44 ML/M2
LEFT VENTRICLE DIASTOLIC VOLUME: 138.45 ML
LEFT VENTRICLE DIASTOLIC VOLUME: 62.44 ML
LEFT VENTRICLE DIASTOLIC VOLUME: 97.58 ML
LEFT VENTRICLE MASS INDEX: 107 G/M2
LEFT VENTRICLE MASS INDEX: 118 G/M2
LEFT VENTRICLE MASS INDEX: 137 G/M2
LEFT VENTRICLE SYSTOLIC VOLUME INDEX: 23.1 ML/M2
LEFT VENTRICLE SYSTOLIC VOLUME INDEX: 25.8 ML/M2
LEFT VENTRICLE SYSTOLIC VOLUME INDEX: 38 ML/M2
LEFT VENTRICLE SYSTOLIC VOLUME: 41.85 ML
LEFT VENTRICLE SYSTOLIC VOLUME: 47.21 ML
LEFT VENTRICLE SYSTOLIC VOLUME: 70.6 ML
LEFT VENTRICULAR INTERNAL DIMENSION IN DIASTOLE: 3.81 CM (ref 3.5–6)
LEFT VENTRICULAR INTERNAL DIMENSION IN DIASTOLE: 4.7 CM (ref 3.5–6)
LEFT VENTRICULAR INTERNAL DIMENSION IN DIASTOLE: 5.35 CM (ref 3.5–6)
LEFT VENTRICULAR MASS: 198.19 G
LEFT VENTRICULAR MASS: 213.89 G
LEFT VENTRICULAR MASS: 251.37 G
LIPASE SERPL-CCNC: 48 U/L (ref 4–60)
LIPASE SERPL-CCNC: 7 U/L (ref 4–60)
LIPASE SERPL-CCNC: 8 U/L (ref 4–60)
LISTERIA MONOCYTOGENES: NOT DETECTED
LV LATERAL E/E' RATIO: 10.11 M/S
LV LATERAL E/E' RATIO: 8.33 M/S
LV LATERAL E/E' RATIO: 9.9 M/S
LV SEPTAL E/E' RATIO: 12.38 M/S
LV SEPTAL E/E' RATIO: 18.2 M/S
LV SEPTAL E/E' RATIO: 8.33 M/S
LYMPHOCYTES # BLD AUTO: 0.2 K/UL (ref 1–4.8)
LYMPHOCYTES # BLD AUTO: 0.3 K/UL (ref 1–4.8)
LYMPHOCYTES # BLD AUTO: 0.4 K/UL (ref 1–4.8)
LYMPHOCYTES # BLD AUTO: 0.5 K/UL (ref 1–4.8)
LYMPHOCYTES # BLD AUTO: 0.6 K/UL (ref 1–4.8)
LYMPHOCYTES # BLD AUTO: 0.7 K/UL (ref 1–4.8)
LYMPHOCYTES # BLD AUTO: 0.8 K/UL (ref 1–4.8)
LYMPHOCYTES # BLD AUTO: 0.9 K/UL (ref 1–4.8)
LYMPHOCYTES # BLD AUTO: 1.3 K/UL (ref 1–4.8)
LYMPHOCYTES # BLD AUTO: 1.8 K/UL (ref 1–4.8)
LYMPHOCYTES # BLD AUTO: ABNORMAL K/UL (ref 1–4.8)
LYMPHOCYTES # BLD AUTO: ABNORMAL K/UL (ref 1–4.8)
LYMPHOCYTES NFR BLD: 1 % (ref 18–48)
LYMPHOCYTES NFR BLD: 10 % (ref 18–48)
LYMPHOCYTES NFR BLD: 10.1 % (ref 18–48)
LYMPHOCYTES NFR BLD: 10.2 % (ref 18–48)
LYMPHOCYTES NFR BLD: 10.3 % (ref 18–48)
LYMPHOCYTES NFR BLD: 10.4 % (ref 18–48)
LYMPHOCYTES NFR BLD: 10.5 % (ref 18–48)
LYMPHOCYTES NFR BLD: 10.6 % (ref 18–48)
LYMPHOCYTES NFR BLD: 10.7 % (ref 18–48)
LYMPHOCYTES NFR BLD: 10.8 % (ref 18–48)
LYMPHOCYTES NFR BLD: 10.8 % (ref 18–48)
LYMPHOCYTES NFR BLD: 11.1 % (ref 18–48)
LYMPHOCYTES NFR BLD: 11.3 % (ref 18–48)
LYMPHOCYTES NFR BLD: 11.3 % (ref 18–48)
LYMPHOCYTES NFR BLD: 11.4 % (ref 18–48)
LYMPHOCYTES NFR BLD: 11.5 % (ref 18–48)
LYMPHOCYTES NFR BLD: 11.5 % (ref 18–48)
LYMPHOCYTES NFR BLD: 11.6 % (ref 18–48)
LYMPHOCYTES NFR BLD: 11.8 % (ref 18–48)
LYMPHOCYTES NFR BLD: 11.8 % (ref 18–48)
LYMPHOCYTES NFR BLD: 11.9 % (ref 18–48)
LYMPHOCYTES NFR BLD: 12.8 % (ref 18–48)
LYMPHOCYTES NFR BLD: 13 % (ref 18–48)
LYMPHOCYTES NFR BLD: 13 % (ref 18–48)
LYMPHOCYTES NFR BLD: 13.4 % (ref 18–48)
LYMPHOCYTES NFR BLD: 13.6 % (ref 18–48)
LYMPHOCYTES NFR BLD: 13.7 % (ref 18–48)
LYMPHOCYTES NFR BLD: 13.8 % (ref 18–48)
LYMPHOCYTES NFR BLD: 13.8 % (ref 18–48)
LYMPHOCYTES NFR BLD: 14 % (ref 18–48)
LYMPHOCYTES NFR BLD: 14.4 % (ref 18–48)
LYMPHOCYTES NFR BLD: 14.7 % (ref 18–48)
LYMPHOCYTES NFR BLD: 14.8 % (ref 18–48)
LYMPHOCYTES NFR BLD: 14.9 % (ref 18–48)
LYMPHOCYTES NFR BLD: 15.2 % (ref 18–48)
LYMPHOCYTES NFR BLD: 15.2 % (ref 18–48)
LYMPHOCYTES NFR BLD: 15.4 % (ref 18–48)
LYMPHOCYTES NFR BLD: 15.6 % (ref 18–48)
LYMPHOCYTES NFR BLD: 15.7 % (ref 18–48)
LYMPHOCYTES NFR BLD: 15.8 % (ref 18–48)
LYMPHOCYTES NFR BLD: 16 % (ref 18–48)
LYMPHOCYTES NFR BLD: 16.8 % (ref 18–48)
LYMPHOCYTES NFR BLD: 16.9 % (ref 18–48)
LYMPHOCYTES NFR BLD: 16.9 % (ref 18–48)
LYMPHOCYTES NFR BLD: 18 % (ref 18–48)
LYMPHOCYTES NFR BLD: 18 % (ref 18–48)
LYMPHOCYTES NFR BLD: 18.1 % (ref 18–48)
LYMPHOCYTES NFR BLD: 18.4 % (ref 18–48)
LYMPHOCYTES NFR BLD: 18.6 % (ref 18–48)
LYMPHOCYTES NFR BLD: 18.6 % (ref 18–48)
LYMPHOCYTES NFR BLD: 19.2 % (ref 18–48)
LYMPHOCYTES NFR BLD: 19.2 % (ref 18–48)
LYMPHOCYTES NFR BLD: 19.4 % (ref 18–48)
LYMPHOCYTES NFR BLD: 19.7 % (ref 18–48)
LYMPHOCYTES NFR BLD: 2.3 % (ref 18–48)
LYMPHOCYTES NFR BLD: 2.5 % (ref 18–48)
LYMPHOCYTES NFR BLD: 2.6 % (ref 18–48)
LYMPHOCYTES NFR BLD: 2.7 % (ref 18–48)
LYMPHOCYTES NFR BLD: 22.1 % (ref 18–48)
LYMPHOCYTES NFR BLD: 22.3 % (ref 18–48)
LYMPHOCYTES NFR BLD: 23 % (ref 18–48)
LYMPHOCYTES NFR BLD: 23.4 % (ref 18–48)
LYMPHOCYTES NFR BLD: 23.9 % (ref 18–48)
LYMPHOCYTES NFR BLD: 25 % (ref 18–48)
LYMPHOCYTES NFR BLD: 27.6 % (ref 18–48)
LYMPHOCYTES NFR BLD: 29.8 % (ref 18–48)
LYMPHOCYTES NFR BLD: 3.7 % (ref 18–48)
LYMPHOCYTES NFR BLD: 38.8 % (ref 18–48)
LYMPHOCYTES NFR BLD: 4.4 % (ref 18–48)
LYMPHOCYTES NFR BLD: 5 % (ref 18–48)
LYMPHOCYTES NFR BLD: 5.1 % (ref 18–48)
LYMPHOCYTES NFR BLD: 5.4 % (ref 18–48)
LYMPHOCYTES NFR BLD: 6 % (ref 18–48)
LYMPHOCYTES NFR BLD: 6.6 % (ref 18–48)
LYMPHOCYTES NFR BLD: 7 % (ref 18–48)
LYMPHOCYTES NFR BLD: 7.4 % (ref 18–48)
LYMPHOCYTES NFR BLD: 7.8 % (ref 18–48)
LYMPHOCYTES NFR BLD: 8 % (ref 18–48)
LYMPHOCYTES NFR BLD: 8 % (ref 18–48)
LYMPHOCYTES NFR BLD: 8.1 % (ref 18–48)
LYMPHOCYTES NFR BLD: 8.2 % (ref 18–48)
LYMPHOCYTES NFR BLD: 8.7 % (ref 18–48)
LYMPHOCYTES NFR BLD: 9 % (ref 18–48)
LYMPHOCYTES NFR BLD: 9.4 % (ref 18–48)
LYMPHOCYTES NFR BLD: 9.9 % (ref 18–48)
LYMPHOCYTES NFR FLD MANUAL: 12 %
LYMPHOCYTES NFR FLD MANUAL: 7 %
Lab: NORMAL
MAGNESIUM SERPL-MCNC: 1.7 MG/DL (ref 1.6–2.6)
MAGNESIUM SERPL-MCNC: 1.8 MG/DL (ref 1.6–2.6)
MAGNESIUM SERPL-MCNC: 1.9 MG/DL (ref 1.6–2.6)
MAGNESIUM SERPL-MCNC: 2 MG/DL (ref 1.6–2.6)
MAGNESIUM SERPL-MCNC: 2.1 MG/DL (ref 1.6–2.6)
MAGNESIUM SERPL-MCNC: 2.2 MG/DL (ref 1.6–2.6)
MAGNESIUM SERPL-MCNC: 2.3 MG/DL (ref 1.6–2.6)
MAGNESIUM SERPL-MCNC: 2.4 MG/DL (ref 1.6–2.6)
MCH RBC QN AUTO: 27.9 PG (ref 27–31)
MCH RBC QN AUTO: 28.4 PG (ref 27–31)
MCH RBC QN AUTO: 28.4 PG (ref 27–31)
MCH RBC QN AUTO: 28.5 PG (ref 27–31)
MCH RBC QN AUTO: 28.6 PG (ref 27–31)
MCH RBC QN AUTO: 28.6 PG (ref 27–31)
MCH RBC QN AUTO: 28.8 PG (ref 27–31)
MCH RBC QN AUTO: 28.9 PG (ref 27–31)
MCH RBC QN AUTO: 29 PG (ref 27–31)
MCH RBC QN AUTO: 29.1 PG (ref 27–31)
MCH RBC QN AUTO: 29.2 PG (ref 27–31)
MCH RBC QN AUTO: 29.3 PG (ref 27–31)
MCH RBC QN AUTO: 29.4 PG (ref 27–31)
MCH RBC QN AUTO: 29.5 PG (ref 27–31)
MCH RBC QN AUTO: 29.6 PG (ref 27–31)
MCH RBC QN AUTO: 29.7 PG (ref 27–31)
MCH RBC QN AUTO: 29.8 PG (ref 27–31)
MCH RBC QN AUTO: 29.9 PG (ref 27–31)
MCH RBC QN AUTO: 29.9 PG (ref 27–31)
MCH RBC QN AUTO: 30 PG (ref 27–31)
MCH RBC QN AUTO: 30.1 PG (ref 27–31)
MCH RBC QN AUTO: 30.2 PG (ref 27–31)
MCH RBC QN AUTO: 30.3 PG (ref 27–31)
MCH RBC QN AUTO: 30.3 PG (ref 27–31)
MCH RBC QN AUTO: 30.4 PG (ref 27–31)
MCH RBC QN AUTO: 30.5 PG (ref 27–31)
MCH RBC QN AUTO: 30.7 PG (ref 27–31)
MCHC RBC AUTO-ENTMCNC: 29.3 G/DL (ref 32–36)
MCHC RBC AUTO-ENTMCNC: 29.8 G/DL (ref 32–36)
MCHC RBC AUTO-ENTMCNC: 29.9 G/DL (ref 32–36)
MCHC RBC AUTO-ENTMCNC: 30.1 G/DL (ref 32–36)
MCHC RBC AUTO-ENTMCNC: 30.1 G/DL (ref 32–36)
MCHC RBC AUTO-ENTMCNC: 30.2 G/DL (ref 32–36)
MCHC RBC AUTO-ENTMCNC: 30.3 G/DL (ref 32–36)
MCHC RBC AUTO-ENTMCNC: 30.6 G/DL (ref 32–36)
MCHC RBC AUTO-ENTMCNC: 30.7 G/DL (ref 32–36)
MCHC RBC AUTO-ENTMCNC: 30.7 G/DL (ref 32–36)
MCHC RBC AUTO-ENTMCNC: 30.8 G/DL (ref 32–36)
MCHC RBC AUTO-ENTMCNC: 30.8 G/DL (ref 32–36)
MCHC RBC AUTO-ENTMCNC: 30.9 G/DL (ref 32–36)
MCHC RBC AUTO-ENTMCNC: 31 G/DL (ref 32–36)
MCHC RBC AUTO-ENTMCNC: 31.1 G/DL (ref 32–36)
MCHC RBC AUTO-ENTMCNC: 31.2 G/DL (ref 32–36)
MCHC RBC AUTO-ENTMCNC: 31.3 G/DL (ref 32–36)
MCHC RBC AUTO-ENTMCNC: 31.4 G/DL (ref 32–36)
MCHC RBC AUTO-ENTMCNC: 31.5 G/DL (ref 32–36)
MCHC RBC AUTO-ENTMCNC: 31.6 G/DL (ref 32–36)
MCHC RBC AUTO-ENTMCNC: 31.7 G/DL (ref 32–36)
MCHC RBC AUTO-ENTMCNC: 31.8 G/DL (ref 32–36)
MCHC RBC AUTO-ENTMCNC: 31.9 G/DL (ref 32–36)
MCHC RBC AUTO-ENTMCNC: 32 G/DL (ref 32–36)
MCHC RBC AUTO-ENTMCNC: 32 G/DL (ref 32–36)
MCHC RBC AUTO-ENTMCNC: 32.1 G/DL (ref 32–36)
MCHC RBC AUTO-ENTMCNC: 32.2 G/DL (ref 32–36)
MCHC RBC AUTO-ENTMCNC: 32.3 G/DL (ref 32–36)
MCHC RBC AUTO-ENTMCNC: 32.4 G/DL (ref 32–36)
MCHC RBC AUTO-ENTMCNC: 32.4 G/DL (ref 32–36)
MCHC RBC AUTO-ENTMCNC: 32.5 G/DL (ref 32–36)
MCHC RBC AUTO-ENTMCNC: 32.6 G/DL (ref 32–36)
MCHC RBC AUTO-ENTMCNC: 32.8 G/DL (ref 32–36)
MCHC RBC AUTO-ENTMCNC: 32.8 G/DL (ref 32–36)
MCHC RBC AUTO-ENTMCNC: 33.1 G/DL (ref 32–36)
MCHC RBC AUTO-ENTMCNC: 33.1 G/DL (ref 32–36)
MCHC RBC AUTO-ENTMCNC: 33.3 G/DL (ref 32–36)
MCHC RBC AUTO-ENTMCNC: 33.5 G/DL (ref 32–36)
MCHC RBC AUTO-ENTMCNC: 33.8 G/DL (ref 32–36)
MCHC RBC AUTO-ENTMCNC: 33.9 G/DL (ref 32–36)
MCHC RBC AUTO-ENTMCNC: 34.8 G/DL (ref 32–36)
MCR-1: ABNORMAL
MCV RBC AUTO: 100 FL (ref 82–98)
MCV RBC AUTO: 87 FL (ref 82–98)
MCV RBC AUTO: 87 FL (ref 82–98)
MCV RBC AUTO: 88 FL (ref 82–98)
MCV RBC AUTO: 89 FL (ref 82–98)
MCV RBC AUTO: 90 FL (ref 82–98)
MCV RBC AUTO: 91 FL (ref 82–98)
MCV RBC AUTO: 92 FL (ref 82–98)
MCV RBC AUTO: 93 FL (ref 82–98)
MCV RBC AUTO: 94 FL (ref 82–98)
MCV RBC AUTO: 95 FL (ref 82–98)
MCV RBC AUTO: 96 FL (ref 82–98)
MCV RBC AUTO: 97 FL (ref 82–98)
MCV RBC AUTO: 97 FL (ref 82–98)
MCV RBC AUTO: 98 FL (ref 82–98)
MEC A/C AND MREJ (MRSA): ABNORMAL
MEC A/C: ABNORMAL
MESOTHL CELL NFR FLD MANUAL: 35 %
METAMYELOCYTES NFR BLD MANUAL: 1 %
METHADONE SERPL QL SCN: NEGATIVE
MIN VOL: 10.4
MIN VOL: 10.8
MIN VOL: 11.1
MIN VOL: 14.7
MODE: ABNORMAL
MONOCYTES # BLD AUTO: 0.1 K/UL (ref 0.3–1)
MONOCYTES # BLD AUTO: 0.2 K/UL (ref 0.3–1)
MONOCYTES # BLD AUTO: 0.3 K/UL (ref 0.3–1)
MONOCYTES # BLD AUTO: 0.4 K/UL (ref 0.3–1)
MONOCYTES # BLD AUTO: 0.5 K/UL (ref 0.3–1)
MONOCYTES # BLD AUTO: 0.6 K/UL (ref 0.3–1)
MONOCYTES # BLD AUTO: 0.7 K/UL (ref 0.3–1)
MONOCYTES # BLD AUTO: 0.8 K/UL (ref 0.3–1)
MONOCYTES # BLD AUTO: ABNORMAL K/UL (ref 0.3–1)
MONOCYTES # BLD AUTO: ABNORMAL K/UL (ref 0.3–1)
MONOCYTES NFR BLD: 0 % (ref 4–15)
MONOCYTES NFR BLD: 10.1 % (ref 4–15)
MONOCYTES NFR BLD: 10.1 % (ref 4–15)
MONOCYTES NFR BLD: 10.4 % (ref 4–15)
MONOCYTES NFR BLD: 10.4 % (ref 4–15)
MONOCYTES NFR BLD: 10.6 % (ref 4–15)
MONOCYTES NFR BLD: 10.6 % (ref 4–15)
MONOCYTES NFR BLD: 10.7 % (ref 4–15)
MONOCYTES NFR BLD: 10.8 % (ref 4–15)
MONOCYTES NFR BLD: 10.9 % (ref 4–15)
MONOCYTES NFR BLD: 11 % (ref 4–15)
MONOCYTES NFR BLD: 11 % (ref 4–15)
MONOCYTES NFR BLD: 11.1 % (ref 4–15)
MONOCYTES NFR BLD: 11.2 % (ref 4–15)
MONOCYTES NFR BLD: 11.4 % (ref 4–15)
MONOCYTES NFR BLD: 11.5 % (ref 4–15)
MONOCYTES NFR BLD: 11.5 % (ref 4–15)
MONOCYTES NFR BLD: 11.6 % (ref 4–15)
MONOCYTES NFR BLD: 11.7 % (ref 4–15)
MONOCYTES NFR BLD: 11.9 % (ref 4–15)
MONOCYTES NFR BLD: 12 % (ref 4–15)
MONOCYTES NFR BLD: 12.3 % (ref 4–15)
MONOCYTES NFR BLD: 12.4 % (ref 4–15)
MONOCYTES NFR BLD: 12.6 % (ref 4–15)
MONOCYTES NFR BLD: 12.7 % (ref 4–15)
MONOCYTES NFR BLD: 13.1 % (ref 4–15)
MONOCYTES NFR BLD: 13.3 % (ref 4–15)
MONOCYTES NFR BLD: 13.4 % (ref 4–15)
MONOCYTES NFR BLD: 13.6 % (ref 4–15)
MONOCYTES NFR BLD: 13.9 % (ref 4–15)
MONOCYTES NFR BLD: 14 % (ref 4–15)
MONOCYTES NFR BLD: 14.1 % (ref 4–15)
MONOCYTES NFR BLD: 14.9 % (ref 4–15)
MONOCYTES NFR BLD: 15 % (ref 4–15)
MONOCYTES NFR BLD: 15.3 % (ref 4–15)
MONOCYTES NFR BLD: 15.7 % (ref 4–15)
MONOCYTES NFR BLD: 16.1 % (ref 4–15)
MONOCYTES NFR BLD: 16.3 % (ref 4–15)
MONOCYTES NFR BLD: 16.4 % (ref 4–15)
MONOCYTES NFR BLD: 16.5 % (ref 4–15)
MONOCYTES NFR BLD: 17.4 % (ref 4–15)
MONOCYTES NFR BLD: 17.7 % (ref 4–15)
MONOCYTES NFR BLD: 18.8 % (ref 4–15)
MONOCYTES NFR BLD: 19.3 % (ref 4–15)
MONOCYTES NFR BLD: 19.4 % (ref 4–15)
MONOCYTES NFR BLD: 2.8 % (ref 4–15)
MONOCYTES NFR BLD: 22.8 % (ref 4–15)
MONOCYTES NFR BLD: 3 % (ref 4–15)
MONOCYTES NFR BLD: 3.5 % (ref 4–15)
MONOCYTES NFR BLD: 3.7 % (ref 4–15)
MONOCYTES NFR BLD: 4 % (ref 4–15)
MONOCYTES NFR BLD: 4.4 % (ref 4–15)
MONOCYTES NFR BLD: 4.6 % (ref 4–15)
MONOCYTES NFR BLD: 4.8 % (ref 4–15)
MONOCYTES NFR BLD: 5.6 % (ref 4–15)
MONOCYTES NFR BLD: 5.9 % (ref 4–15)
MONOCYTES NFR BLD: 5.9 % (ref 4–15)
MONOCYTES NFR BLD: 6.1 % (ref 4–15)
MONOCYTES NFR BLD: 6.1 % (ref 4–15)
MONOCYTES NFR BLD: 6.2 % (ref 4–15)
MONOCYTES NFR BLD: 6.6 % (ref 4–15)
MONOCYTES NFR BLD: 6.7 % (ref 4–15)
MONOCYTES NFR BLD: 7.1 % (ref 4–15)
MONOCYTES NFR BLD: 7.2 % (ref 4–15)
MONOCYTES NFR BLD: 7.4 % (ref 4–15)
MONOCYTES NFR BLD: 7.4 % (ref 4–15)
MONOCYTES NFR BLD: 7.6 % (ref 4–15)
MONOCYTES NFR BLD: 7.6 % (ref 4–15)
MONOCYTES NFR BLD: 7.7 % (ref 4–15)
MONOCYTES NFR BLD: 7.8 % (ref 4–15)
MONOCYTES NFR BLD: 7.8 % (ref 4–15)
MONOCYTES NFR BLD: 8 % (ref 4–15)
MONOCYTES NFR BLD: 8.1 % (ref 4–15)
MONOCYTES NFR BLD: 8.6 % (ref 4–15)
MONOCYTES NFR BLD: 8.7 % (ref 4–15)
MONOCYTES NFR BLD: 8.8 % (ref 4–15)
MONOCYTES NFR BLD: 8.8 % (ref 4–15)
MONOCYTES NFR BLD: 8.9 % (ref 4–15)
MONOCYTES NFR BLD: 8.9 % (ref 4–15)
MONOCYTES NFR BLD: 9 % (ref 4–15)
MONOCYTES NFR BLD: 9.1 % (ref 4–15)
MONOCYTES NFR BLD: 9.4 % (ref 4–15)
MONOCYTES NFR BLD: 9.4 % (ref 4–15)
MONOCYTES NFR BLD: 9.5 % (ref 4–15)
MONOCYTES NFR BLD: 9.6 % (ref 4–15)
MONOCYTES NFR BLD: 9.7 % (ref 4–15)
MONOCYTES NFR BLD: 9.7 % (ref 4–15)
MONOCYTES NFR BLD: 9.8 % (ref 4–15)
MONOS+MACROS NFR FLD MANUAL: 46 %
MONOS+MACROS NFR FLD MANUAL: 80 %
MV PEAK A VEL: 0.29 M/S
MV PEAK A VEL: 0.75 M/S
MV PEAK A VEL: 1.13 M/S
MV PEAK E VEL: 0.25 M/S
MV PEAK E VEL: 0.91 M/S
MV PEAK E VEL: 0.99 M/S
MV STENOSIS PRESSURE HALF TIME: 45.22 MS
MV STENOSIS PRESSURE HALF TIME: 50.81 MS
MV STENOSIS PRESSURE HALF TIME: 53.85 MS
MV VALVE AREA P 1/2 METHOD: 4.09 CM2
MV VALVE AREA P 1/2 METHOD: 4.33 CM2
MV VALVE AREA P 1/2 METHOD: 4.87 CM2
MYELOCYTES NFR BLD MANUAL: 1 %
NDM GENE: ABNORMAL
NEISSERIA MENINGITIDIS: NOT DETECTED
NEUTROPHILS # BLD AUTO: 1.2 K/UL (ref 1.8–7.7)
NEUTROPHILS # BLD AUTO: 1.6 K/UL (ref 1.8–7.7)
NEUTROPHILS # BLD AUTO: 1.7 K/UL (ref 1.8–7.7)
NEUTROPHILS # BLD AUTO: 1.7 K/UL (ref 1.8–7.7)
NEUTROPHILS # BLD AUTO: 1.8 K/UL (ref 1.8–7.7)
NEUTROPHILS # BLD AUTO: 1.8 K/UL (ref 1.8–7.7)
NEUTROPHILS # BLD AUTO: 1.9 K/UL (ref 1.8–7.7)
NEUTROPHILS # BLD AUTO: 10 K/UL (ref 1.8–7.7)
NEUTROPHILS # BLD AUTO: 2 K/UL (ref 1.8–7.7)
NEUTROPHILS # BLD AUTO: 2.1 K/UL (ref 1.8–7.7)
NEUTROPHILS # BLD AUTO: 2.2 K/UL (ref 1.8–7.7)
NEUTROPHILS # BLD AUTO: 2.3 K/UL (ref 1.8–7.7)
NEUTROPHILS # BLD AUTO: 2.4 K/UL (ref 1.8–7.7)
NEUTROPHILS # BLD AUTO: 2.4 K/UL (ref 1.8–7.7)
NEUTROPHILS # BLD AUTO: 2.5 K/UL (ref 1.8–7.7)
NEUTROPHILS # BLD AUTO: 2.6 K/UL (ref 1.8–7.7)
NEUTROPHILS # BLD AUTO: 2.6 K/UL (ref 1.8–7.7)
NEUTROPHILS # BLD AUTO: 2.7 K/UL (ref 1.8–7.7)
NEUTROPHILS # BLD AUTO: 2.8 K/UL (ref 1.8–7.7)
NEUTROPHILS # BLD AUTO: 2.9 K/UL (ref 1.8–7.7)
NEUTROPHILS # BLD AUTO: 3 K/UL (ref 1.8–7.7)
NEUTROPHILS # BLD AUTO: 3.1 K/UL (ref 1.8–7.7)
NEUTROPHILS # BLD AUTO: 3.1 K/UL (ref 1.8–7.7)
NEUTROPHILS # BLD AUTO: 3.2 K/UL (ref 1.8–7.7)
NEUTROPHILS # BLD AUTO: 3.2 K/UL (ref 1.8–7.7)
NEUTROPHILS # BLD AUTO: 3.3 K/UL (ref 1.8–7.7)
NEUTROPHILS # BLD AUTO: 3.4 K/UL (ref 1.8–7.7)
NEUTROPHILS # BLD AUTO: 3.4 K/UL (ref 1.8–7.7)
NEUTROPHILS # BLD AUTO: 3.5 K/UL (ref 1.8–7.7)
NEUTROPHILS # BLD AUTO: 3.5 K/UL (ref 1.8–7.7)
NEUTROPHILS # BLD AUTO: 3.6 K/UL (ref 1.8–7.7)
NEUTROPHILS # BLD AUTO: 3.7 K/UL (ref 1.8–7.7)
NEUTROPHILS # BLD AUTO: 4 K/UL (ref 1.8–7.7)
NEUTROPHILS # BLD AUTO: 4.1 K/UL (ref 1.8–7.7)
NEUTROPHILS # BLD AUTO: 4.1 K/UL (ref 1.8–7.7)
NEUTROPHILS # BLD AUTO: 4.2 K/UL (ref 1.8–7.7)
NEUTROPHILS # BLD AUTO: 4.3 K/UL (ref 1.8–7.7)
NEUTROPHILS # BLD AUTO: 4.3 K/UL (ref 1.8–7.7)
NEUTROPHILS # BLD AUTO: 4.4 K/UL (ref 1.8–7.7)
NEUTROPHILS # BLD AUTO: 4.5 K/UL (ref 1.8–7.7)
NEUTROPHILS # BLD AUTO: 4.6 K/UL (ref 1.8–7.7)
NEUTROPHILS # BLD AUTO: 4.6 K/UL (ref 1.8–7.7)
NEUTROPHILS # BLD AUTO: 4.8 K/UL (ref 1.8–7.7)
NEUTROPHILS # BLD AUTO: 5.2 K/UL (ref 1.8–7.7)
NEUTROPHILS # BLD AUTO: 5.3 K/UL (ref 1.8–7.7)
NEUTROPHILS # BLD AUTO: 5.6 K/UL (ref 1.8–7.7)
NEUTROPHILS # BLD AUTO: 6.4 K/UL (ref 1.8–7.7)
NEUTROPHILS # BLD AUTO: 6.4 K/UL (ref 1.8–7.7)
NEUTROPHILS # BLD AUTO: 7.3 K/UL (ref 1.8–7.7)
NEUTROPHILS # BLD AUTO: 7.3 K/UL (ref 1.8–7.7)
NEUTROPHILS # BLD AUTO: 9.1 K/UL (ref 1.8–7.7)
NEUTROPHILS # BLD AUTO: 9.7 K/UL (ref 1.8–7.7)
NEUTROPHILS # BLD AUTO: 9.9 K/UL (ref 1.8–7.7)
NEUTROPHILS NFR BLD: 40.4 % (ref 38–73)
NEUTROPHILS NFR BLD: 50 % (ref 38–73)
NEUTROPHILS NFR BLD: 55 % (ref 38–73)
NEUTROPHILS NFR BLD: 58.6 % (ref 38–73)
NEUTROPHILS NFR BLD: 58.6 % (ref 38–73)
NEUTROPHILS NFR BLD: 59.3 % (ref 38–73)
NEUTROPHILS NFR BLD: 60 % (ref 38–73)
NEUTROPHILS NFR BLD: 60 % (ref 38–73)
NEUTROPHILS NFR BLD: 61.3 % (ref 38–73)
NEUTROPHILS NFR BLD: 61.7 % (ref 38–73)
NEUTROPHILS NFR BLD: 63.7 % (ref 38–73)
NEUTROPHILS NFR BLD: 63.9 % (ref 38–73)
NEUTROPHILS NFR BLD: 64.5 % (ref 38–73)
NEUTROPHILS NFR BLD: 64.6 % (ref 38–73)
NEUTROPHILS NFR BLD: 65 % (ref 38–73)
NEUTROPHILS NFR BLD: 65.7 % (ref 38–73)
NEUTROPHILS NFR BLD: 65.7 % (ref 38–73)
NEUTROPHILS NFR BLD: 66.6 % (ref 38–73)
NEUTROPHILS NFR BLD: 67.1 % (ref 38–73)
NEUTROPHILS NFR BLD: 67.1 % (ref 38–73)
NEUTROPHILS NFR BLD: 67.3 % (ref 38–73)
NEUTROPHILS NFR BLD: 67.5 % (ref 38–73)
NEUTROPHILS NFR BLD: 67.7 % (ref 38–73)
NEUTROPHILS NFR BLD: 68 % (ref 38–73)
NEUTROPHILS NFR BLD: 68.1 % (ref 38–73)
NEUTROPHILS NFR BLD: 69.4 % (ref 38–73)
NEUTROPHILS NFR BLD: 69.6 % (ref 38–73)
NEUTROPHILS NFR BLD: 69.8 % (ref 38–73)
NEUTROPHILS NFR BLD: 70.3 % (ref 38–73)
NEUTROPHILS NFR BLD: 70.6 % (ref 38–73)
NEUTROPHILS NFR BLD: 71.3 % (ref 38–73)
NEUTROPHILS NFR BLD: 71.5 % (ref 38–73)
NEUTROPHILS NFR BLD: 71.6 % (ref 38–73)
NEUTROPHILS NFR BLD: 71.8 % (ref 38–73)
NEUTROPHILS NFR BLD: 71.9 % (ref 38–73)
NEUTROPHILS NFR BLD: 72.4 % (ref 38–73)
NEUTROPHILS NFR BLD: 72.5 % (ref 38–73)
NEUTROPHILS NFR BLD: 72.6 % (ref 38–73)
NEUTROPHILS NFR BLD: 72.8 % (ref 38–73)
NEUTROPHILS NFR BLD: 72.9 % (ref 38–73)
NEUTROPHILS NFR BLD: 73 % (ref 38–73)
NEUTROPHILS NFR BLD: 73.6 % (ref 38–73)
NEUTROPHILS NFR BLD: 73.7 % (ref 38–73)
NEUTROPHILS NFR BLD: 73.7 % (ref 38–73)
NEUTROPHILS NFR BLD: 74.1 % (ref 38–73)
NEUTROPHILS NFR BLD: 74.2 % (ref 38–73)
NEUTROPHILS NFR BLD: 75.1 % (ref 38–73)
NEUTROPHILS NFR BLD: 75.5 % (ref 38–73)
NEUTROPHILS NFR BLD: 75.7 % (ref 38–73)
NEUTROPHILS NFR BLD: 76 % (ref 38–73)
NEUTROPHILS NFR BLD: 76.3 % (ref 38–73)
NEUTROPHILS NFR BLD: 76.9 % (ref 38–73)
NEUTROPHILS NFR BLD: 76.9 % (ref 38–73)
NEUTROPHILS NFR BLD: 77.5 % (ref 38–73)
NEUTROPHILS NFR BLD: 77.8 % (ref 38–73)
NEUTROPHILS NFR BLD: 77.9 % (ref 38–73)
NEUTROPHILS NFR BLD: 78 % (ref 38–73)
NEUTROPHILS NFR BLD: 78.2 % (ref 38–73)
NEUTROPHILS NFR BLD: 78.5 % (ref 38–73)
NEUTROPHILS NFR BLD: 78.6 % (ref 38–73)
NEUTROPHILS NFR BLD: 79.2 % (ref 38–73)
NEUTROPHILS NFR BLD: 79.6 % (ref 38–73)
NEUTROPHILS NFR BLD: 79.8 % (ref 38–73)
NEUTROPHILS NFR BLD: 80.1 % (ref 38–73)
NEUTROPHILS NFR BLD: 80.2 % (ref 38–73)
NEUTROPHILS NFR BLD: 80.3 % (ref 38–73)
NEUTROPHILS NFR BLD: 80.6 % (ref 38–73)
NEUTROPHILS NFR BLD: 80.8 % (ref 38–73)
NEUTROPHILS NFR BLD: 80.9 % (ref 38–73)
NEUTROPHILS NFR BLD: 82 % (ref 38–73)
NEUTROPHILS NFR BLD: 82.2 % (ref 38–73)
NEUTROPHILS NFR BLD: 82.8 % (ref 38–73)
NEUTROPHILS NFR BLD: 83 % (ref 38–73)
NEUTROPHILS NFR BLD: 83.2 % (ref 38–73)
NEUTROPHILS NFR BLD: 83.8 % (ref 38–73)
NEUTROPHILS NFR BLD: 84.5 % (ref 38–73)
NEUTROPHILS NFR BLD: 85 % (ref 38–73)
NEUTROPHILS NFR BLD: 85.3 % (ref 38–73)
NEUTROPHILS NFR BLD: 85.7 % (ref 38–73)
NEUTROPHILS NFR BLD: 87 % (ref 38–73)
NEUTROPHILS NFR BLD: 87.1 % (ref 38–73)
NEUTROPHILS NFR BLD: 87.2 % (ref 38–73)
NEUTROPHILS NFR BLD: 87.8 % (ref 38–73)
NEUTROPHILS NFR BLD: 88.1 % (ref 38–73)
NEUTROPHILS NFR BLD: 88.1 % (ref 38–73)
NEUTROPHILS NFR BLD: 88.2 % (ref 38–73)
NEUTROPHILS NFR BLD: 88.8 % (ref 38–73)
NEUTROPHILS NFR BLD: 89.6 % (ref 38–73)
NEUTROPHILS NFR BLD: 90.7 % (ref 38–73)
NEUTROPHILS NFR BLD: 93.2 % (ref 38–73)
NEUTROPHILS NFR BLD: 94.3 % (ref 38–73)
NEUTROPHILS NFR FLD MANUAL: 5 %
NEUTROPHILS NFR FLD MANUAL: 8 %
NEUTS BAND NFR BLD MANUAL: 13 %
NEUTS BAND NFR BLD MANUAL: 14 %
NEUTS BAND NFR BLD MANUAL: 3 %
NEUTS BAND NFR BLD MANUAL: 5 %
NONHDLC SERPL-MCNC: 36 MG/DL
NRBC BLD-RTO: 0 /100 WBC
NRBC BLD-RTO: 1 /100 WBC
NRBC BLD-RTO: 2 /100 WBC
NRBC BLD-RTO: 3 /100 WBC
NRBC BLD-RTO: 3 /100 WBC
NUM UNITS TRANS PACKED RBC: NORMAL
NUM UNITS TRANS PACKED RBC: NORMAL
OHS LV EJECTION FRACTION SIMPSONS BIPLANE MOD: 33 %
OPIATES SERPL QL SCN: NEGATIVE
OSMOLALITY SERPL: 315 MOSM/KG (ref 280–300)
OTHER CELLS FLD MANUAL: 7 %
OVALOCYTES BLD QL SMEAR: ABNORMAL
OXA-48-LIKE: ABNORMAL
PATH INTERP FLD-IMP: NORMAL
PCO2 BLDA: 22.4 MMHG (ref 35–45)
PCO2 BLDA: 30.7 MMHG (ref 35–45)
PCO2 BLDA: 34.8 MMHG (ref 35–45)
PCO2 BLDA: 34.8 MMHG (ref 35–45)
PCO2 BLDA: 35.5 MMHG (ref 35–45)
PCO2 BLDA: 35.9 MMHG (ref 35–45)
PCO2 BLDA: 37 MMHG (ref 35–45)
PCO2 BLDA: 38.2 MMHG (ref 35–45)
PCO2 BLDA: 38.5 MMHG (ref 35–45)
PCO2 BLDA: 38.5 MMHG (ref 35–45)
PCO2 BLDA: 39.2 MMHG (ref 35–45)
PCO2 BLDA: 46.3 MMHG (ref 35–45)
PCO2 BLDA: 47.4 MMHG (ref 35–45)
PCO2 BLDA: 48.5 MMHG (ref 35–45)
PCO2 BLDA: 49.9 MMHG (ref 35–45)
PCO2 BLDA: 52 MMHG (ref 35–45)
PCO2 BLDA: 53.7 MMHG (ref 35–45)
PCO2 BLDA: 55.3 MMHG (ref 35–45)
PCO2 BLDA: 61.2 MMHG (ref 35–45)
PCO2 BLDA: 63.3 MMHG (ref 35–45)
PCO2 BLDA: 68.1 MMHG (ref 35–45)
PCO2 BLDA: 69 MMHG (ref 35–45)
PCO2 BLDA: 71.8 MMHG (ref 35–45)
PCO2 BLDA: 73 MMHG (ref 35–45)
PCO2 BLDA: 76.3 MMHG (ref 35–45)
PCP SERPL QL SCN: NEGATIVE
PEEP: 5
PF4 HEPARIN CMPLX AB SER QL: 0.7 OD (ref 0–0.4)
PH SMN: 7.05 [PH] (ref 7.35–7.45)
PH SMN: 7.23 [PH] (ref 7.35–7.45)
PH SMN: 7.23 [PH] (ref 7.35–7.45)
PH SMN: 7.27 [PH] (ref 7.35–7.45)
PH SMN: 7.28 [PH] (ref 7.35–7.45)
PH SMN: 7.29 [PH] (ref 7.35–7.45)
PH SMN: 7.29 [PH] (ref 7.35–7.45)
PH SMN: 7.31 [PH] (ref 7.35–7.45)
PH SMN: 7.33 [PH] (ref 7.35–7.45)
PH SMN: 7.35 [PH] (ref 7.35–7.45)
PH SMN: 7.36 [PH] (ref 7.35–7.45)
PH SMN: 7.37 [PH] (ref 7.35–7.45)
PH SMN: 7.38 [PH] (ref 7.35–7.45)
PH SMN: 7.39 [PH] (ref 7.35–7.45)
PH SMN: 7.41 [PH] (ref 7.35–7.45)
PH SMN: 7.42 [PH] (ref 7.35–7.45)
PH SMN: 7.45 [PH] (ref 7.35–7.45)
PH SMN: 7.46 [PH] (ref 7.35–7.45)
PHOSPHATE SERPL-MCNC: 1.6 MG/DL (ref 2.7–4.5)
PHOSPHATE SERPL-MCNC: 1.8 MG/DL (ref 2.7–4.5)
PHOSPHATE SERPL-MCNC: 2 MG/DL (ref 2.7–4.5)
PHOSPHATE SERPL-MCNC: 2.1 MG/DL (ref 2.7–4.5)
PHOSPHATE SERPL-MCNC: 2.1 MG/DL (ref 2.7–4.5)
PHOSPHATE SERPL-MCNC: 2.6 MG/DL (ref 2.7–4.5)
PHOSPHATE SERPL-MCNC: 2.6 MG/DL (ref 2.7–4.5)
PHOSPHATE SERPL-MCNC: 2.8 MG/DL (ref 2.7–4.5)
PHOSPHATE SERPL-MCNC: 2.9 MG/DL (ref 2.7–4.5)
PHOSPHATE SERPL-MCNC: 3 MG/DL (ref 2.7–4.5)
PHOSPHATE SERPL-MCNC: 3.1 MG/DL (ref 2.7–4.5)
PHOSPHATE SERPL-MCNC: 3.2 MG/DL (ref 2.7–4.5)
PHOSPHATE SERPL-MCNC: 3.3 MG/DL (ref 2.7–4.5)
PHOSPHATE SERPL-MCNC: 3.3 MG/DL (ref 2.7–4.5)
PHOSPHATE SERPL-MCNC: 3.4 MG/DL (ref 2.7–4.5)
PHOSPHATE SERPL-MCNC: 3.5 MG/DL (ref 2.7–4.5)
PHOSPHATE SERPL-MCNC: 3.6 MG/DL (ref 2.7–4.5)
PHOSPHATE SERPL-MCNC: 3.7 MG/DL (ref 2.7–4.5)
PHOSPHATE SERPL-MCNC: 3.7 MG/DL (ref 2.7–4.5)
PHOSPHATE SERPL-MCNC: 3.8 MG/DL (ref 2.7–4.5)
PHOSPHATE SERPL-MCNC: 3.9 MG/DL (ref 2.7–4.5)
PHOSPHATE SERPL-MCNC: 4 MG/DL (ref 2.7–4.5)
PHOSPHATE SERPL-MCNC: 4.1 MG/DL (ref 2.7–4.5)
PHOSPHATE SERPL-MCNC: 4.2 MG/DL (ref 2.7–4.5)
PHOSPHATE SERPL-MCNC: 4.3 MG/DL (ref 2.7–4.5)
PHOSPHATE SERPL-MCNC: 4.4 MG/DL (ref 2.7–4.5)
PHOSPHATE SERPL-MCNC: 4.6 MG/DL (ref 2.7–4.5)
PHOSPHATE SERPL-MCNC: 4.8 MG/DL (ref 2.7–4.5)
PHOSPHATE SERPL-MCNC: 5 MG/DL (ref 2.7–4.5)
PHOSPHATE SERPL-MCNC: 5.2 MG/DL (ref 2.7–4.5)
PHOSPHATE SERPL-MCNC: 5.3 MG/DL (ref 2.7–4.5)
PHOSPHATE SERPL-MCNC: 5.8 MG/DL (ref 2.7–4.5)
PHOSPHATE SERPL-MCNC: 6.2 MG/DL (ref 2.7–4.5)
PHOSPHATE SERPL-MCNC: 6.3 MG/DL (ref 2.7–4.5)
PHOSPHATE SERPL-MCNC: 7.3 MG/DL (ref 2.7–4.5)
PHOSPHATE SERPL-MCNC: 7.8 MG/DL (ref 2.7–4.5)
PIP: 17
PIP: 19
PIP: 19
PISA TR MAX VEL: 2.6 M/S
PISA TR MAX VEL: 3.13 M/S
PISA TR MAX VEL: 3.41 M/S
PLATELET # BLD AUTO: 100 K/UL (ref 150–450)
PLATELET # BLD AUTO: 106 K/UL (ref 150–450)
PLATELET # BLD AUTO: 117 K/UL (ref 150–450)
PLATELET # BLD AUTO: 121 K/UL (ref 150–450)
PLATELET # BLD AUTO: 128 K/UL (ref 150–450)
PLATELET # BLD AUTO: 130 K/UL (ref 150–450)
PLATELET # BLD AUTO: 130 K/UL (ref 150–450)
PLATELET # BLD AUTO: 132 K/UL (ref 150–450)
PLATELET # BLD AUTO: 132 K/UL (ref 150–450)
PLATELET # BLD AUTO: 134 K/UL (ref 150–450)
PLATELET # BLD AUTO: 138 K/UL (ref 150–450)
PLATELET # BLD AUTO: 140 K/UL (ref 150–450)
PLATELET # BLD AUTO: 141 K/UL (ref 150–450)
PLATELET # BLD AUTO: 142 K/UL (ref 150–450)
PLATELET # BLD AUTO: 143 K/UL (ref 150–450)
PLATELET # BLD AUTO: 145 K/UL (ref 150–450)
PLATELET # BLD AUTO: 150 K/UL (ref 150–450)
PLATELET # BLD AUTO: 150 K/UL (ref 150–450)
PLATELET # BLD AUTO: 151 K/UL (ref 150–450)
PLATELET # BLD AUTO: 151 K/UL (ref 150–450)
PLATELET # BLD AUTO: 152 K/UL (ref 150–450)
PLATELET # BLD AUTO: 153 K/UL (ref 150–450)
PLATELET # BLD AUTO: 154 K/UL (ref 150–450)
PLATELET # BLD AUTO: 155 K/UL (ref 150–450)
PLATELET # BLD AUTO: 156 K/UL (ref 150–450)
PLATELET # BLD AUTO: 157 K/UL (ref 150–450)
PLATELET # BLD AUTO: 158 K/UL (ref 150–450)
PLATELET # BLD AUTO: 159 K/UL (ref 150–450)
PLATELET # BLD AUTO: 160 K/UL (ref 150–450)
PLATELET # BLD AUTO: 161 K/UL (ref 150–450)
PLATELET # BLD AUTO: 162 K/UL (ref 150–450)
PLATELET # BLD AUTO: 163 K/UL (ref 150–450)
PLATELET # BLD AUTO: 164 K/UL (ref 150–450)
PLATELET # BLD AUTO: 164 K/UL (ref 150–450)
PLATELET # BLD AUTO: 165 K/UL (ref 150–450)
PLATELET # BLD AUTO: 168 K/UL (ref 150–450)
PLATELET # BLD AUTO: 168 K/UL (ref 150–450)
PLATELET # BLD AUTO: 171 K/UL (ref 150–450)
PLATELET # BLD AUTO: 174 K/UL (ref 150–450)
PLATELET # BLD AUTO: 175 K/UL (ref 150–450)
PLATELET # BLD AUTO: 176 K/UL (ref 150–450)
PLATELET # BLD AUTO: 177 K/UL (ref 150–450)
PLATELET # BLD AUTO: 177 K/UL (ref 150–450)
PLATELET # BLD AUTO: 178 K/UL (ref 150–450)
PLATELET # BLD AUTO: 179 K/UL (ref 150–450)
PLATELET # BLD AUTO: 179 K/UL (ref 150–450)
PLATELET # BLD AUTO: 183 K/UL (ref 150–450)
PLATELET # BLD AUTO: 183 K/UL (ref 150–450)
PLATELET # BLD AUTO: 184 K/UL (ref 150–450)
PLATELET # BLD AUTO: 186 K/UL (ref 150–450)
PLATELET # BLD AUTO: 188 K/UL (ref 150–450)
PLATELET # BLD AUTO: 188 K/UL (ref 150–450)
PLATELET # BLD AUTO: 192 K/UL (ref 150–450)
PLATELET # BLD AUTO: 194 K/UL (ref 150–450)
PLATELET # BLD AUTO: 197 K/UL (ref 150–450)
PLATELET # BLD AUTO: 198 K/UL (ref 150–450)
PLATELET # BLD AUTO: 203 K/UL (ref 150–450)
PLATELET # BLD AUTO: 204 K/UL (ref 150–450)
PLATELET # BLD AUTO: 211 K/UL (ref 150–450)
PLATELET # BLD AUTO: 261 K/UL (ref 150–450)
PLATELET # BLD AUTO: 31 K/UL (ref 150–450)
PLATELET # BLD AUTO: 35 K/UL (ref 150–450)
PLATELET # BLD AUTO: 39 K/UL (ref 150–450)
PLATELET # BLD AUTO: 48 K/UL (ref 150–450)
PLATELET # BLD AUTO: 48 K/UL (ref 150–450)
PLATELET # BLD AUTO: 50 K/UL (ref 150–450)
PLATELET # BLD AUTO: 62 K/UL (ref 150–450)
PLATELET # BLD AUTO: 62 K/UL (ref 150–450)
PLATELET # BLD AUTO: 67 K/UL (ref 150–450)
PLATELET # BLD AUTO: 69 K/UL (ref 150–450)
PLATELET # BLD AUTO: 78 K/UL (ref 150–450)
PLATELET # BLD AUTO: 84 K/UL (ref 150–450)
PLATELET # BLD AUTO: 85 K/UL (ref 150–450)
PLATELET # BLD AUTO: 89 K/UL (ref 150–450)
PLATELET # BLD AUTO: 91 K/UL (ref 150–450)
PLATELET # BLD AUTO: 91 K/UL (ref 150–450)
PLATELET # BLD AUTO: 93 K/UL (ref 150–450)
PLATELET # BLD AUTO: 94 K/UL (ref 150–450)
PLATELET # BLD AUTO: 95 K/UL (ref 150–450)
PLATELET # BLD AUTO: 97 K/UL (ref 150–450)
PLATELET BLD QL SMEAR: ABNORMAL
PMV BLD AUTO: 10.1 FL (ref 9.2–12.9)
PMV BLD AUTO: 10.1 FL (ref 9.2–12.9)
PMV BLD AUTO: 10.2 FL (ref 9.2–12.9)
PMV BLD AUTO: 10.3 FL (ref 9.2–12.9)
PMV BLD AUTO: 10.4 FL (ref 9.2–12.9)
PMV BLD AUTO: 10.5 FL (ref 9.2–12.9)
PMV BLD AUTO: 10.5 FL (ref 9.2–12.9)
PMV BLD AUTO: 10.6 FL (ref 9.2–12.9)
PMV BLD AUTO: 10.7 FL (ref 9.2–12.9)
PMV BLD AUTO: 10.8 FL (ref 9.2–12.9)
PMV BLD AUTO: 10.9 FL (ref 9.2–12.9)
PMV BLD AUTO: 11 FL (ref 9.2–12.9)
PMV BLD AUTO: 11.1 FL (ref 9.2–12.9)
PMV BLD AUTO: 11.2 FL (ref 9.2–12.9)
PMV BLD AUTO: 11.3 FL (ref 9.2–12.9)
PMV BLD AUTO: 11.4 FL (ref 9.2–12.9)
PMV BLD AUTO: 11.5 FL (ref 9.2–12.9)
PMV BLD AUTO: 11.6 FL (ref 9.2–12.9)
PMV BLD AUTO: 11.7 FL (ref 9.2–12.9)
PMV BLD AUTO: 11.8 FL (ref 9.2–12.9)
PMV BLD AUTO: 11.9 FL (ref 9.2–12.9)
PMV BLD AUTO: 12 FL (ref 9.2–12.9)
PMV BLD AUTO: 12 FL (ref 9.2–12.9)
PMV BLD AUTO: 12.1 FL (ref 9.2–12.9)
PMV BLD AUTO: 12.2 FL (ref 9.2–12.9)
PMV BLD AUTO: 12.3 FL (ref 9.2–12.9)
PMV BLD AUTO: 12.4 FL (ref 9.2–12.9)
PMV BLD AUTO: 12.6 FL (ref 9.2–12.9)
PMV BLD AUTO: 12.6 FL (ref 9.2–12.9)
PMV BLD AUTO: 12.7 FL (ref 9.2–12.9)
PMV BLD AUTO: 12.9 FL (ref 9.2–12.9)
PMV BLD AUTO: 12.9 FL (ref 9.2–12.9)
PMV BLD AUTO: 9.8 FL (ref 9.2–12.9)
PMV BLD AUTO: ABNORMAL FL (ref 9.2–12.9)
PO2 BLDA: 104 MMHG (ref 80–100)
PO2 BLDA: 11 MMHG (ref 40–60)
PO2 BLDA: 114 MMHG (ref 80–100)
PO2 BLDA: 13 MMHG (ref 40–60)
PO2 BLDA: 133 MMHG (ref 80–100)
PO2 BLDA: 18 MMHG (ref 40–60)
PO2 BLDA: 197 MMHG (ref 80–100)
PO2 BLDA: 20 MMHG (ref 40–60)
PO2 BLDA: 235 MMHG (ref 80–100)
PO2 BLDA: 29 MMHG (ref 80–100)
PO2 BLDA: 31 MMHG (ref 40–60)
PO2 BLDA: 33 MMHG (ref 40–60)
PO2 BLDA: 40 MMHG (ref 40–60)
PO2 BLDA: 44 MMHG (ref 40–60)
PO2 BLDA: 45 MMHG (ref 40–60)
PO2 BLDA: 56 MMHG (ref 40–60)
PO2 BLDA: 58 MMHG (ref 80–100)
PO2 BLDA: 60 MMHG (ref 80–100)
PO2 BLDA: 61 MMHG (ref 40–60)
PO2 BLDA: 64 MMHG (ref 80–100)
PO2 BLDA: 67 MMHG (ref 80–100)
PO2 BLDA: 74 MMHG (ref 40–60)
PO2 BLDA: 78 MMHG (ref 80–100)
PO2 BLDA: 82 MMHG (ref 80–100)
PO2 BLDA: 84 MMHG (ref 80–100)
POC BE: -12 MMOL/L
POC BE: -12 MMOL/L
POC BE: -13 MMOL/L
POC BE: -3 MMOL/L
POC BE: -3 MMOL/L
POC BE: -4 MMOL/L
POC BE: -5 MMOL/L
POC BE: -6 MMOL/L
POC BE: -9 MMOL/L
POC BE: 0 MMOL/L
POC BE: 1 MMOL/L
POC BE: 3 MMOL/L
POC BE: 3 MMOL/L
POC BE: 4 MMOL/L
POC BE: 4 MMOL/L
POC BE: 5 MMOL/L
POC BE: 6 MMOL/L
POC BE: 6 MMOL/L
POC BE: 7 MMOL/L
POC BE: 8 MMOL/L
POC BE: 8 MMOL/L
POC IONIZED CALCIUM: 0.77 MMOL/L (ref 1.06–1.42)
POC IONIZED CALCIUM: 0.87 MMOL/L (ref 1.06–1.42)
POC IONIZED CALCIUM: 0.91 MMOL/L (ref 1.06–1.42)
POC IONIZED CALCIUM: 0.94 MMOL/L (ref 1.06–1.42)
POC IONIZED CALCIUM: 1 MMOL/L (ref 1.06–1.42)
POC IONIZED CALCIUM: 1.01 MMOL/L (ref 1.06–1.42)
POC IONIZED CALCIUM: 1.03 MMOL/L (ref 1.06–1.42)
POC IONIZED CALCIUM: 1.05 MMOL/L (ref 1.06–1.42)
POC IONIZED CALCIUM: 1.05 MMOL/L (ref 1.06–1.42)
POC IONIZED CALCIUM: 1.06 MMOL/L (ref 1.06–1.42)
POC IONIZED CALCIUM: 1.09 MMOL/L (ref 1.06–1.42)
POC SATURATED O2: 100 % (ref 95–100)
POC SATURATED O2: 100 % (ref 95–100)
POC SATURATED O2: 12 % (ref 95–100)
POC SATURATED O2: 19 % (ref 95–100)
POC SATURATED O2: 23 % (ref 95–100)
POC SATURATED O2: 45 % (ref 95–100)
POC SATURATED O2: 52 % (ref 95–100)
POC SATURATED O2: 56 % (ref 95–100)
POC SATURATED O2: 72 % (ref 95–100)
POC SATURATED O2: 73 % (ref 95–100)
POC SATURATED O2: 80 % (ref 95–100)
POC SATURATED O2: 86 % (ref 95–100)
POC SATURATED O2: 87 % (ref 95–100)
POC SATURATED O2: 9 % (ref 95–100)
POC SATURATED O2: 90 % (ref 95–100)
POC SATURATED O2: 92 % (ref 95–100)
POC SATURATED O2: 92 % (ref 95–100)
POC SATURATED O2: 94 % (ref 95–100)
POC SATURATED O2: 94 % (ref 95–100)
POC SATURATED O2: 95 % (ref 95–100)
POC SATURATED O2: 95 % (ref 95–100)
POC SATURATED O2: 96 % (ref 95–100)
POC SATURATED O2: 97 % (ref 95–100)
POC SATURATED O2: 98 % (ref 95–100)
POC SATURATED O2: 99 % (ref 95–100)
POC TCO2 (MEASURED): 18 MMOL/L (ref 23–29)
POC TCO2 (MEASURED): 20 MMOL/L (ref 23–29)
POC TCO2 (MEASURED): 23 MMOL/L (ref 23–29)
POC TCO2 (MEASURED): 26 MMOL/L (ref 23–29)
POC TCO2 (MEASURED): 27 MMOL/L (ref 23–29)
POC TCO2 (MEASURED): 28 MMOL/L (ref 23–29)
POC TCO2 (MEASURED): 29 MMOL/L (ref 23–29)
POC TCO2 (MEASURED): 29 MMOL/L (ref 23–29)
POC TCO2 (MEASURED): 31 MMOL/L (ref 23–29)
POC TCO2: 14 MMOL/L (ref 23–27)
POC TCO2: 17 MMOL/L (ref 23–27)
POC TCO2: 19 MMOL/L (ref 23–27)
POC TCO2: 20 MMOL/L (ref 23–27)
POC TCO2: 20 MMOL/L (ref 23–27)
POC TCO2: 21 MMOL/L (ref 23–27)
POC TCO2: 22 MMOL/L (ref 23–27)
POC TCO2: 23 MMOL/L (ref 23–27)
POC TCO2: 26 MMOL/L (ref 23–27)
POC TCO2: 27 MMOL/L (ref 24–29)
POC TCO2: 28 MMOL/L (ref 23–27)
POC TCO2: 28 MMOL/L (ref 24–29)
POC TCO2: 28 MMOL/L (ref 24–29)
POC TCO2: 29 MMOL/L (ref 24–29)
POC TCO2: 30 MMOL/L (ref 24–29)
POC TCO2: 31 MMOL/L (ref 23–27)
POC TCO2: 32 MMOL/L (ref 24–29)
POC TCO2: 33 MMOL/L (ref 24–29)
POC TCO2: 33 MMOL/L (ref 24–29)
POC TCO2: 34 MMOL/L (ref 24–29)
POC TCO2: 35 MMOL/L (ref 24–29)
POC TCO2: 36 MMOL/L (ref 24–29)
POC TCO2: 37 MMOL/L (ref 24–29)
POCT GLUCOSE: 100 MG/DL (ref 70–110)
POCT GLUCOSE: 101 MG/DL (ref 70–110)
POCT GLUCOSE: 102 MG/DL (ref 70–110)
POCT GLUCOSE: 103 MG/DL (ref 70–110)
POCT GLUCOSE: 104 MG/DL (ref 70–110)
POCT GLUCOSE: 105 MG/DL (ref 70–110)
POCT GLUCOSE: 106 MG/DL (ref 70–110)
POCT GLUCOSE: 107 MG/DL (ref 70–110)
POCT GLUCOSE: 108 MG/DL (ref 70–110)
POCT GLUCOSE: 109 MG/DL (ref 70–110)
POCT GLUCOSE: 109 MG/DL (ref 70–110)
POCT GLUCOSE: 111 MG/DL (ref 70–110)
POCT GLUCOSE: 112 MG/DL (ref 70–110)
POCT GLUCOSE: 112 MG/DL (ref 70–110)
POCT GLUCOSE: 113 MG/DL (ref 70–110)
POCT GLUCOSE: 113 MG/DL (ref 70–110)
POCT GLUCOSE: 114 MG/DL (ref 70–110)
POCT GLUCOSE: 114 MG/DL (ref 70–110)
POCT GLUCOSE: 115 MG/DL (ref 70–110)
POCT GLUCOSE: 116 MG/DL (ref 70–110)
POCT GLUCOSE: 116 MG/DL (ref 70–110)
POCT GLUCOSE: 117 MG/DL (ref 70–110)
POCT GLUCOSE: 118 MG/DL (ref 70–110)
POCT GLUCOSE: 120 MG/DL (ref 70–110)
POCT GLUCOSE: 120 MG/DL (ref 70–110)
POCT GLUCOSE: 121 MG/DL (ref 70–110)
POCT GLUCOSE: 122 MG/DL (ref 70–110)
POCT GLUCOSE: 124 MG/DL (ref 70–110)
POCT GLUCOSE: 125 MG/DL (ref 70–110)
POCT GLUCOSE: 126 MG/DL (ref 70–110)
POCT GLUCOSE: 127 MG/DL (ref 70–110)
POCT GLUCOSE: 127 MG/DL (ref 70–110)
POCT GLUCOSE: 128 MG/DL (ref 70–110)
POCT GLUCOSE: 129 MG/DL (ref 70–110)
POCT GLUCOSE: 129 MG/DL (ref 70–110)
POCT GLUCOSE: 130 MG/DL (ref 70–110)
POCT GLUCOSE: 131 MG/DL (ref 70–110)
POCT GLUCOSE: 131 MG/DL (ref 70–110)
POCT GLUCOSE: 132 MG/DL (ref 70–110)
POCT GLUCOSE: 133 MG/DL (ref 70–110)
POCT GLUCOSE: 134 MG/DL (ref 70–110)
POCT GLUCOSE: 134 MG/DL (ref 70–110)
POCT GLUCOSE: 135 MG/DL (ref 70–110)
POCT GLUCOSE: 136 MG/DL (ref 70–110)
POCT GLUCOSE: 136 MG/DL (ref 70–110)
POCT GLUCOSE: 137 MG/DL (ref 70–110)
POCT GLUCOSE: 137 MG/DL (ref 70–110)
POCT GLUCOSE: 138 MG/DL (ref 70–110)
POCT GLUCOSE: 139 MG/DL (ref 70–110)
POCT GLUCOSE: 139 MG/DL (ref 70–110)
POCT GLUCOSE: 140 MG/DL (ref 70–110)
POCT GLUCOSE: 141 MG/DL (ref 70–110)
POCT GLUCOSE: 142 MG/DL (ref 70–110)
POCT GLUCOSE: 143 MG/DL (ref 70–110)
POCT GLUCOSE: 143 MG/DL (ref 70–110)
POCT GLUCOSE: 144 MG/DL (ref 70–110)
POCT GLUCOSE: 145 MG/DL (ref 70–110)
POCT GLUCOSE: 146 MG/DL (ref 70–110)
POCT GLUCOSE: 146 MG/DL (ref 70–110)
POCT GLUCOSE: 147 MG/DL (ref 70–110)
POCT GLUCOSE: 148 MG/DL (ref 70–110)
POCT GLUCOSE: 149 MG/DL (ref 70–110)
POCT GLUCOSE: 150 MG/DL (ref 70–110)
POCT GLUCOSE: 151 MG/DL (ref 70–110)
POCT GLUCOSE: 152 MG/DL (ref 70–110)
POCT GLUCOSE: 153 MG/DL (ref 70–110)
POCT GLUCOSE: 153 MG/DL (ref 70–110)
POCT GLUCOSE: 154 MG/DL (ref 70–110)
POCT GLUCOSE: 155 MG/DL (ref 70–110)
POCT GLUCOSE: 155 MG/DL (ref 70–110)
POCT GLUCOSE: 157 MG/DL (ref 70–110)
POCT GLUCOSE: 158 MG/DL (ref 70–110)
POCT GLUCOSE: 158 MG/DL (ref 70–110)
POCT GLUCOSE: 159 MG/DL (ref 70–110)
POCT GLUCOSE: 160 MG/DL (ref 70–110)
POCT GLUCOSE: 161 MG/DL (ref 70–110)
POCT GLUCOSE: 162 MG/DL (ref 70–110)
POCT GLUCOSE: 164 MG/DL (ref 70–110)
POCT GLUCOSE: 165 MG/DL (ref 70–110)
POCT GLUCOSE: 166 MG/DL (ref 70–110)
POCT GLUCOSE: 166 MG/DL (ref 70–110)
POCT GLUCOSE: 167 MG/DL (ref 70–110)
POCT GLUCOSE: 169 MG/DL (ref 70–110)
POCT GLUCOSE: 169 MG/DL (ref 70–110)
POCT GLUCOSE: 170 MG/DL (ref 70–110)
POCT GLUCOSE: 171 MG/DL (ref 70–110)
POCT GLUCOSE: 172 MG/DL (ref 70–110)
POCT GLUCOSE: 173 MG/DL (ref 70–110)
POCT GLUCOSE: 173 MG/DL (ref 70–110)
POCT GLUCOSE: 175 MG/DL (ref 70–110)
POCT GLUCOSE: 175 MG/DL (ref 70–110)
POCT GLUCOSE: 176 MG/DL (ref 70–110)
POCT GLUCOSE: 177 MG/DL (ref 70–110)
POCT GLUCOSE: 179 MG/DL (ref 70–110)
POCT GLUCOSE: 179 MG/DL (ref 70–110)
POCT GLUCOSE: 180 MG/DL (ref 70–110)
POCT GLUCOSE: 181 MG/DL (ref 70–110)
POCT GLUCOSE: 182 MG/DL (ref 70–110)
POCT GLUCOSE: 182 MG/DL (ref 70–110)
POCT GLUCOSE: 183 MG/DL (ref 70–110)
POCT GLUCOSE: 184 MG/DL (ref 70–110)
POCT GLUCOSE: 185 MG/DL (ref 70–110)
POCT GLUCOSE: 186 MG/DL (ref 70–110)
POCT GLUCOSE: 188 MG/DL (ref 70–110)
POCT GLUCOSE: 188 MG/DL (ref 70–110)
POCT GLUCOSE: 189 MG/DL (ref 70–110)
POCT GLUCOSE: 190 MG/DL (ref 70–110)
POCT GLUCOSE: 190 MG/DL (ref 70–110)
POCT GLUCOSE: 192 MG/DL (ref 70–110)
POCT GLUCOSE: 193 MG/DL (ref 70–110)
POCT GLUCOSE: 194 MG/DL (ref 70–110)
POCT GLUCOSE: 195 MG/DL (ref 70–110)
POCT GLUCOSE: 196 MG/DL (ref 70–110)
POCT GLUCOSE: 196 MG/DL (ref 70–110)
POCT GLUCOSE: 199 MG/DL (ref 70–110)
POCT GLUCOSE: 200 MG/DL (ref 70–110)
POCT GLUCOSE: 201 MG/DL (ref 70–110)
POCT GLUCOSE: 203 MG/DL (ref 70–110)
POCT GLUCOSE: 205 MG/DL (ref 70–110)
POCT GLUCOSE: 206 MG/DL (ref 70–110)
POCT GLUCOSE: 207 MG/DL (ref 70–110)
POCT GLUCOSE: 208 MG/DL (ref 70–110)
POCT GLUCOSE: 209 MG/DL (ref 70–110)
POCT GLUCOSE: 210 MG/DL (ref 70–110)
POCT GLUCOSE: 210 MG/DL (ref 70–110)
POCT GLUCOSE: 211 MG/DL (ref 70–110)
POCT GLUCOSE: 211 MG/DL (ref 70–110)
POCT GLUCOSE: 212 MG/DL (ref 70–110)
POCT GLUCOSE: 214 MG/DL (ref 70–110)
POCT GLUCOSE: 214 MG/DL (ref 70–110)
POCT GLUCOSE: 216 MG/DL (ref 70–110)
POCT GLUCOSE: 217 MG/DL (ref 70–110)
POCT GLUCOSE: 219 MG/DL (ref 70–110)
POCT GLUCOSE: 219 MG/DL (ref 70–110)
POCT GLUCOSE: 220 MG/DL (ref 70–110)
POCT GLUCOSE: 221 MG/DL (ref 70–110)
POCT GLUCOSE: 221 MG/DL (ref 70–110)
POCT GLUCOSE: 222 MG/DL (ref 70–110)
POCT GLUCOSE: 223 MG/DL (ref 70–110)
POCT GLUCOSE: 225 MG/DL (ref 70–110)
POCT GLUCOSE: 226 MG/DL (ref 70–110)
POCT GLUCOSE: 227 MG/DL (ref 70–110)
POCT GLUCOSE: 228 MG/DL (ref 70–110)
POCT GLUCOSE: 229 MG/DL (ref 70–110)
POCT GLUCOSE: 229 MG/DL (ref 70–110)
POCT GLUCOSE: 23 MG/DL (ref 70–110)
POCT GLUCOSE: 232 MG/DL (ref 70–110)
POCT GLUCOSE: 232 MG/DL (ref 70–110)
POCT GLUCOSE: 235 MG/DL (ref 70–110)
POCT GLUCOSE: 235 MG/DL (ref 70–110)
POCT GLUCOSE: 236 MG/DL (ref 70–110)
POCT GLUCOSE: 238 MG/DL (ref 70–110)
POCT GLUCOSE: 239 MG/DL (ref 70–110)
POCT GLUCOSE: 240 MG/DL (ref 70–110)
POCT GLUCOSE: 243 MG/DL (ref 70–110)
POCT GLUCOSE: 244 MG/DL (ref 70–110)
POCT GLUCOSE: 244 MG/DL (ref 70–110)
POCT GLUCOSE: 245 MG/DL (ref 70–110)
POCT GLUCOSE: 245 MG/DL (ref 70–110)
POCT GLUCOSE: 246 MG/DL (ref 70–110)
POCT GLUCOSE: 246 MG/DL (ref 70–110)
POCT GLUCOSE: 250 MG/DL (ref 70–110)
POCT GLUCOSE: 252 MG/DL (ref 70–110)
POCT GLUCOSE: 253 MG/DL (ref 70–110)
POCT GLUCOSE: 253 MG/DL (ref 70–110)
POCT GLUCOSE: 256 MG/DL (ref 70–110)
POCT GLUCOSE: 257 MG/DL (ref 70–110)
POCT GLUCOSE: 258 MG/DL (ref 70–110)
POCT GLUCOSE: 258 MG/DL (ref 70–110)
POCT GLUCOSE: 259 MG/DL (ref 70–110)
POCT GLUCOSE: 260 MG/DL (ref 70–110)
POCT GLUCOSE: 261 MG/DL (ref 70–110)
POCT GLUCOSE: 262 MG/DL (ref 70–110)
POCT GLUCOSE: 262 MG/DL (ref 70–110)
POCT GLUCOSE: 264 MG/DL (ref 70–110)
POCT GLUCOSE: 264 MG/DL (ref 70–110)
POCT GLUCOSE: 266 MG/DL (ref 70–110)
POCT GLUCOSE: 267 MG/DL (ref 70–110)
POCT GLUCOSE: 268 MG/DL (ref 70–110)
POCT GLUCOSE: 270 MG/DL (ref 70–110)
POCT GLUCOSE: 270 MG/DL (ref 70–110)
POCT GLUCOSE: 273 MG/DL (ref 70–110)
POCT GLUCOSE: 275 MG/DL (ref 70–110)
POCT GLUCOSE: 276 MG/DL (ref 70–110)
POCT GLUCOSE: 278 MG/DL (ref 70–110)
POCT GLUCOSE: 283 MG/DL (ref 70–110)
POCT GLUCOSE: 288 MG/DL (ref 70–110)
POCT GLUCOSE: 29 MG/DL (ref 70–110)
POCT GLUCOSE: 291 MG/DL (ref 70–110)
POCT GLUCOSE: 293 MG/DL (ref 70–110)
POCT GLUCOSE: 294 MG/DL (ref 70–110)
POCT GLUCOSE: 298 MG/DL (ref 70–110)
POCT GLUCOSE: 299 MG/DL (ref 70–110)
POCT GLUCOSE: 30 MG/DL (ref 70–110)
POCT GLUCOSE: 301 MG/DL (ref 70–110)
POCT GLUCOSE: 302 MG/DL (ref 70–110)
POCT GLUCOSE: 303 MG/DL (ref 70–110)
POCT GLUCOSE: 304 MG/DL (ref 70–110)
POCT GLUCOSE: 305 MG/DL (ref 70–110)
POCT GLUCOSE: 306 MG/DL (ref 70–110)
POCT GLUCOSE: 307 MG/DL (ref 70–110)
POCT GLUCOSE: 308 MG/DL (ref 70–110)
POCT GLUCOSE: 309 MG/DL (ref 70–110)
POCT GLUCOSE: 310 MG/DL (ref 70–110)
POCT GLUCOSE: 313 MG/DL (ref 70–110)
POCT GLUCOSE: 315 MG/DL (ref 70–110)
POCT GLUCOSE: 316 MG/DL (ref 70–110)
POCT GLUCOSE: 318 MG/DL (ref 70–110)
POCT GLUCOSE: 320 MG/DL (ref 70–110)
POCT GLUCOSE: 322 MG/DL (ref 70–110)
POCT GLUCOSE: 323 MG/DL (ref 70–110)
POCT GLUCOSE: 324 MG/DL (ref 70–110)
POCT GLUCOSE: 328 MG/DL (ref 70–110)
POCT GLUCOSE: 331 MG/DL (ref 70–110)
POCT GLUCOSE: 333 MG/DL (ref 70–110)
POCT GLUCOSE: 333 MG/DL (ref 70–110)
POCT GLUCOSE: 336 MG/DL (ref 70–110)
POCT GLUCOSE: 337 MG/DL (ref 70–110)
POCT GLUCOSE: 338 MG/DL (ref 70–110)
POCT GLUCOSE: 34 MG/DL (ref 70–110)
POCT GLUCOSE: 343 MG/DL (ref 70–110)
POCT GLUCOSE: 344 MG/DL (ref 70–110)
POCT GLUCOSE: 346 MG/DL (ref 70–110)
POCT GLUCOSE: 347 MG/DL (ref 70–110)
POCT GLUCOSE: 348 MG/DL (ref 70–110)
POCT GLUCOSE: 349 MG/DL (ref 70–110)
POCT GLUCOSE: 350 MG/DL (ref 70–110)
POCT GLUCOSE: 350 MG/DL (ref 70–110)
POCT GLUCOSE: 351 MG/DL (ref 70–110)
POCT GLUCOSE: 354 MG/DL (ref 70–110)
POCT GLUCOSE: 357 MG/DL (ref 70–110)
POCT GLUCOSE: 357 MG/DL (ref 70–110)
POCT GLUCOSE: 36 MG/DL (ref 70–110)
POCT GLUCOSE: 360 MG/DL (ref 70–110)
POCT GLUCOSE: 364 MG/DL (ref 70–110)
POCT GLUCOSE: 368 MG/DL (ref 70–110)
POCT GLUCOSE: 37 MG/DL (ref 70–110)
POCT GLUCOSE: 37 MG/DL (ref 70–110)
POCT GLUCOSE: 377 MG/DL (ref 70–110)
POCT GLUCOSE: 380 MG/DL (ref 70–110)
POCT GLUCOSE: 381 MG/DL (ref 70–110)
POCT GLUCOSE: 383 MG/DL (ref 70–110)
POCT GLUCOSE: 387 MG/DL (ref 70–110)
POCT GLUCOSE: 388 MG/DL (ref 70–110)
POCT GLUCOSE: 39 MG/DL (ref 70–110)
POCT GLUCOSE: 39 MG/DL (ref 70–110)
POCT GLUCOSE: 390 MG/DL (ref 70–110)
POCT GLUCOSE: 391 MG/DL (ref 70–110)
POCT GLUCOSE: 391 MG/DL (ref 70–110)
POCT GLUCOSE: 392 MG/DL (ref 70–110)
POCT GLUCOSE: 394 MG/DL (ref 70–110)
POCT GLUCOSE: 40 MG/DL (ref 70–110)
POCT GLUCOSE: 407 MG/DL (ref 70–110)
POCT GLUCOSE: 41 MG/DL (ref 70–110)
POCT GLUCOSE: 415 MG/DL (ref 70–110)
POCT GLUCOSE: 418 MG/DL (ref 70–110)
POCT GLUCOSE: 418 MG/DL (ref 70–110)
POCT GLUCOSE: 426 MG/DL (ref 70–110)
POCT GLUCOSE: 43 MG/DL (ref 70–110)
POCT GLUCOSE: 430 MG/DL (ref 70–110)
POCT GLUCOSE: 432 MG/DL (ref 70–110)
POCT GLUCOSE: 440 MG/DL (ref 70–110)
POCT GLUCOSE: 441 MG/DL (ref 70–110)
POCT GLUCOSE: 447 MG/DL (ref 70–110)
POCT GLUCOSE: 45 MG/DL (ref 70–110)
POCT GLUCOSE: 461 MG/DL (ref 70–110)
POCT GLUCOSE: 48 MG/DL (ref 70–110)
POCT GLUCOSE: 483 MG/DL (ref 70–110)
POCT GLUCOSE: 485 MG/DL (ref 70–110)
POCT GLUCOSE: 487 MG/DL (ref 70–110)
POCT GLUCOSE: 50 MG/DL (ref 70–110)
POCT GLUCOSE: 51 MG/DL (ref 70–110)
POCT GLUCOSE: 52 MG/DL (ref 70–110)
POCT GLUCOSE: 53 MG/DL (ref 70–110)
POCT GLUCOSE: 54 MG/DL (ref 70–110)
POCT GLUCOSE: 55 MG/DL (ref 70–110)
POCT GLUCOSE: 55 MG/DL (ref 70–110)
POCT GLUCOSE: 56 MG/DL (ref 70–110)
POCT GLUCOSE: 56 MG/DL (ref 70–110)
POCT GLUCOSE: 57 MG/DL (ref 70–110)
POCT GLUCOSE: 59 MG/DL (ref 70–110)
POCT GLUCOSE: 60 MG/DL (ref 70–110)
POCT GLUCOSE: 60 MG/DL (ref 70–110)
POCT GLUCOSE: 61 MG/DL (ref 70–110)
POCT GLUCOSE: 63 MG/DL (ref 70–110)
POCT GLUCOSE: 64 MG/DL (ref 70–110)
POCT GLUCOSE: 64 MG/DL (ref 70–110)
POCT GLUCOSE: 65 MG/DL (ref 70–110)
POCT GLUCOSE: 67 MG/DL (ref 70–110)
POCT GLUCOSE: 67 MG/DL (ref 70–110)
POCT GLUCOSE: 69 MG/DL (ref 70–110)
POCT GLUCOSE: 70 MG/DL (ref 70–110)
POCT GLUCOSE: 70 MG/DL (ref 70–110)
POCT GLUCOSE: 71 MG/DL (ref 70–110)
POCT GLUCOSE: 71 MG/DL (ref 70–110)
POCT GLUCOSE: 72 MG/DL (ref 70–110)
POCT GLUCOSE: 72 MG/DL (ref 70–110)
POCT GLUCOSE: 73 MG/DL (ref 70–110)
POCT GLUCOSE: 73 MG/DL (ref 70–110)
POCT GLUCOSE: 74 MG/DL (ref 70–110)
POCT GLUCOSE: 75 MG/DL (ref 70–110)
POCT GLUCOSE: 76 MG/DL (ref 70–110)
POCT GLUCOSE: 76 MG/DL (ref 70–110)
POCT GLUCOSE: 77 MG/DL (ref 70–110)
POCT GLUCOSE: 77 MG/DL (ref 70–110)
POCT GLUCOSE: 78 MG/DL (ref 70–110)
POCT GLUCOSE: 78 MG/DL (ref 70–110)
POCT GLUCOSE: 79 MG/DL (ref 70–110)
POCT GLUCOSE: 79 MG/DL (ref 70–110)
POCT GLUCOSE: 80 MG/DL (ref 70–110)
POCT GLUCOSE: 81 MG/DL (ref 70–110)
POCT GLUCOSE: 82 MG/DL (ref 70–110)
POCT GLUCOSE: 84 MG/DL (ref 70–110)
POCT GLUCOSE: 85 MG/DL (ref 70–110)
POCT GLUCOSE: 86 MG/DL (ref 70–110)
POCT GLUCOSE: 87 MG/DL (ref 70–110)
POCT GLUCOSE: 88 MG/DL (ref 70–110)
POCT GLUCOSE: 89 MG/DL (ref 70–110)
POCT GLUCOSE: 89 MG/DL (ref 70–110)
POCT GLUCOSE: 91 MG/DL (ref 70–110)
POCT GLUCOSE: 92 MG/DL (ref 70–110)
POCT GLUCOSE: 93 MG/DL (ref 70–110)
POCT GLUCOSE: 94 MG/DL (ref 70–110)
POCT GLUCOSE: 94 MG/DL (ref 70–110)
POCT GLUCOSE: 95 MG/DL (ref 70–110)
POCT GLUCOSE: 96 MG/DL (ref 70–110)
POCT GLUCOSE: 96 MG/DL (ref 70–110)
POCT GLUCOSE: 97 MG/DL (ref 70–110)
POCT GLUCOSE: 97 MG/DL (ref 70–110)
POCT GLUCOSE: 98 MG/DL (ref 70–110)
POCT GLUCOSE: 98 MG/DL (ref 70–110)
POCT GLUCOSE: <20 MG/DL (ref 70–110)
POCT GLUCOSE: >500 MG/DL (ref 70–110)
POIKILOCYTOSIS BLD QL SMEAR: ABNORMAL
POIKILOCYTOSIS BLD QL SMEAR: SLIGHT
POLYCHROMASIA BLD QL SMEAR: ABNORMAL
POTASSIUM BLD-SCNC: 3.3 MMOL/L (ref 3.5–5.1)
POTASSIUM BLD-SCNC: 3.6 MMOL/L (ref 3.5–5.1)
POTASSIUM BLD-SCNC: 3.6 MMOL/L (ref 3.5–5.1)
POTASSIUM BLD-SCNC: 3.7 MMOL/L (ref 3.5–5.1)
POTASSIUM BLD-SCNC: 3.8 MMOL/L (ref 3.5–5.1)
POTASSIUM BLD-SCNC: 3.9 MMOL/L (ref 3.5–5.1)
POTASSIUM BLD-SCNC: 4.1 MMOL/L (ref 3.5–5.1)
POTASSIUM BLD-SCNC: 4.4 MMOL/L (ref 3.5–5.1)
POTASSIUM BLD-SCNC: 4.8 MMOL/L (ref 3.5–5.1)
POTASSIUM BLD-SCNC: 4.9 MMOL/L (ref 3.5–5.1)
POTASSIUM BLD-SCNC: 4.9 MMOL/L (ref 3.5–5.1)
POTASSIUM BLD-SCNC: 5 MMOL/L (ref 3.5–5.1)
POTASSIUM BLD-SCNC: 5.3 MMOL/L (ref 3.5–5.1)
POTASSIUM SERPL-SCNC: 3.1 MMOL/L (ref 3.5–5.1)
POTASSIUM SERPL-SCNC: 3.2 MMOL/L (ref 3.5–5.1)
POTASSIUM SERPL-SCNC: 3.4 MMOL/L (ref 3.5–5.1)
POTASSIUM SERPL-SCNC: 3.4 MMOL/L (ref 3.5–5.1)
POTASSIUM SERPL-SCNC: 3.5 MMOL/L (ref 3.5–5.1)
POTASSIUM SERPL-SCNC: 3.6 MMOL/L (ref 3.5–5.1)
POTASSIUM SERPL-SCNC: 3.7 MMOL/L (ref 3.5–5.1)
POTASSIUM SERPL-SCNC: 3.7 MMOL/L (ref 3.5–5.1)
POTASSIUM SERPL-SCNC: 3.8 MMOL/L (ref 3.5–5.1)
POTASSIUM SERPL-SCNC: 3.9 MMOL/L (ref 3.5–5.1)
POTASSIUM SERPL-SCNC: 4 MMOL/L (ref 3.5–5.1)
POTASSIUM SERPL-SCNC: 4.1 MMOL/L (ref 3.5–5.1)
POTASSIUM SERPL-SCNC: 4.2 MMOL/L (ref 3.5–5.1)
POTASSIUM SERPL-SCNC: 4.3 MMOL/L (ref 3.5–5.1)
POTASSIUM SERPL-SCNC: 4.4 MMOL/L (ref 3.5–5.1)
POTASSIUM SERPL-SCNC: 4.5 MMOL/L (ref 3.5–5.1)
POTASSIUM SERPL-SCNC: 4.6 MMOL/L (ref 3.5–5.1)
POTASSIUM SERPL-SCNC: 4.7 MMOL/L (ref 3.5–5.1)
POTASSIUM SERPL-SCNC: 4.8 MMOL/L (ref 3.5–5.1)
POTASSIUM SERPL-SCNC: 4.9 MMOL/L (ref 3.5–5.1)
POTASSIUM SERPL-SCNC: 5 MMOL/L (ref 3.5–5.1)
POTASSIUM SERPL-SCNC: 5.1 MMOL/L (ref 3.5–5.1)
POTASSIUM SERPL-SCNC: 5.2 MMOL/L (ref 3.5–5.1)
POTASSIUM SERPL-SCNC: 5.3 MMOL/L (ref 3.5–5.1)
POTASSIUM SERPL-SCNC: 5.4 MMOL/L (ref 3.5–5.1)
POTASSIUM SERPL-SCNC: 5.4 MMOL/L (ref 3.5–5.1)
POTASSIUM SERPL-SCNC: 5.8 MMOL/L (ref 3.5–5.1)
POTASSIUM SERPL-SCNC: 5.9 MMOL/L (ref 3.5–5.1)
POTASSIUM SERPL-SCNC: 6 MMOL/L (ref 3.5–5.1)
POTASSIUM SERPL-SCNC: 6.6 MMOL/L (ref 3.5–5.1)
PROCALCITONIN SERPL IA-MCNC: 0.54 NG/ML
PROCALCITONIN SERPL IA-MCNC: 0.55 NG/ML
PROCALCITONIN SERPL IA-MCNC: 1.06 NG/ML
PROCALCITONIN SERPL IA-MCNC: 13.19 NG/ML
PROPOXYPH SERPL QL: NEGATIVE
PROT FLD-MCNC: 3.4 G/DL
PROT FLD-MCNC: 3.8 G/DL
PROT SERPL-MCNC: 4.3 G/DL (ref 6–8.4)
PROT SERPL-MCNC: 4.8 G/DL (ref 6–8.4)
PROT SERPL-MCNC: 4.8 G/DL (ref 6–8.4)
PROT SERPL-MCNC: 4.9 G/DL (ref 6–8.4)
PROT SERPL-MCNC: 5 G/DL (ref 6–8.4)
PROT SERPL-MCNC: 5 G/DL (ref 6–8.4)
PROT SERPL-MCNC: 5.2 G/DL (ref 6–8.4)
PROT SERPL-MCNC: 5.2 G/DL (ref 6–8.4)
PROT SERPL-MCNC: 5.3 G/DL (ref 6–8.4)
PROT SERPL-MCNC: 5.4 G/DL (ref 6–8.4)
PROT SERPL-MCNC: 5.5 G/DL (ref 6–8.4)
PROT SERPL-MCNC: 5.6 G/DL (ref 6–8.4)
PROT SERPL-MCNC: 5.7 G/DL (ref 6–8.4)
PROT SERPL-MCNC: 5.8 G/DL (ref 6–8.4)
PROT SERPL-MCNC: 5.9 G/DL (ref 6–8.4)
PROT SERPL-MCNC: 6 G/DL (ref 6–8.4)
PROT SERPL-MCNC: 6.1 G/DL (ref 6–8.4)
PROT SERPL-MCNC: 6.2 G/DL (ref 6–8.4)
PROT SERPL-MCNC: 6.3 G/DL (ref 6–8.4)
PROT SERPL-MCNC: 6.4 G/DL (ref 6–8.4)
PROT SERPL-MCNC: 6.6 G/DL (ref 6–8.4)
PROT SERPL-MCNC: 6.6 G/DL (ref 6–8.4)
PROT SERPL-MCNC: 6.7 G/DL (ref 6–8.4)
PROT SERPL-MCNC: 6.8 G/DL (ref 6–8.4)
PROT SERPL-MCNC: 6.9 G/DL (ref 6–8.4)
PROT SERPL-MCNC: 7.2 G/DL (ref 6–8.4)
PROTEUS SPECIES: NOT DETECTED
PROTHROMBIN TIME: 14.1 SEC (ref 9–12.5)
PROTHROMBIN TIME: 15.1 SEC (ref 9–12.5)
PROTHROMBIN TIME: 15.2 SEC (ref 9–12.5)
PROTHROMBIN TIME: 15.2 SEC (ref 9–12.5)
PROTHROMBIN TIME: 15.7 SEC (ref 9–12.5)
PROTHROMBIN TIME: 16 SEC (ref 9–12.5)
PROTHROMBIN TIME: 24.9 SEC (ref 9–12.5)
PSEUDOMONAS AERUGINOSA: NOT DETECTED
RA MAJOR: 4.56 CM
RA MAJOR: 5.8 CM
RA MAJOR: 6.45 CM
RA PRESSURE: 15 MMHG
RA WIDTH: 4.19 CM
RA WIDTH: 4.29 CM
RA WIDTH: 5.46 CM
RBC # BLD AUTO: 2.28 M/UL (ref 4.6–6.2)
RBC # BLD AUTO: 2.38 M/UL (ref 4.6–6.2)
RBC # BLD AUTO: 2.4 M/UL (ref 4.6–6.2)
RBC # BLD AUTO: 2.43 M/UL (ref 4.6–6.2)
RBC # BLD AUTO: 2.43 M/UL (ref 4.6–6.2)
RBC # BLD AUTO: 2.44 M/UL (ref 4.6–6.2)
RBC # BLD AUTO: 2.47 M/UL (ref 4.6–6.2)
RBC # BLD AUTO: 2.49 M/UL (ref 4.6–6.2)
RBC # BLD AUTO: 2.5 M/UL (ref 4.6–6.2)
RBC # BLD AUTO: 2.5 M/UL (ref 4.6–6.2)
RBC # BLD AUTO: 2.54 M/UL (ref 4.6–6.2)
RBC # BLD AUTO: 2.55 M/UL (ref 4.6–6.2)
RBC # BLD AUTO: 2.56 M/UL (ref 4.6–6.2)
RBC # BLD AUTO: 2.57 M/UL (ref 4.6–6.2)
RBC # BLD AUTO: 2.57 M/UL (ref 4.6–6.2)
RBC # BLD AUTO: 2.58 M/UL (ref 4.6–6.2)
RBC # BLD AUTO: 2.58 M/UL (ref 4.6–6.2)
RBC # BLD AUTO: 2.6 M/UL (ref 4.6–6.2)
RBC # BLD AUTO: 2.61 M/UL (ref 4.6–6.2)
RBC # BLD AUTO: 2.64 M/UL (ref 4.6–6.2)
RBC # BLD AUTO: 2.64 M/UL (ref 4.6–6.2)
RBC # BLD AUTO: 2.65 M/UL (ref 4.6–6.2)
RBC # BLD AUTO: 2.66 M/UL (ref 4.6–6.2)
RBC # BLD AUTO: 2.7 M/UL (ref 4.6–6.2)
RBC # BLD AUTO: 2.72 M/UL (ref 4.6–6.2)
RBC # BLD AUTO: 2.72 M/UL (ref 4.6–6.2)
RBC # BLD AUTO: 2.74 M/UL (ref 4.6–6.2)
RBC # BLD AUTO: 2.74 M/UL (ref 4.6–6.2)
RBC # BLD AUTO: 2.77 M/UL (ref 4.6–6.2)
RBC # BLD AUTO: 2.79 M/UL (ref 4.6–6.2)
RBC # BLD AUTO: 2.79 M/UL (ref 4.6–6.2)
RBC # BLD AUTO: 2.8 M/UL (ref 4.6–6.2)
RBC # BLD AUTO: 2.82 M/UL (ref 4.6–6.2)
RBC # BLD AUTO: 2.83 M/UL (ref 4.6–6.2)
RBC # BLD AUTO: 2.84 M/UL (ref 4.6–6.2)
RBC # BLD AUTO: 2.87 M/UL (ref 4.6–6.2)
RBC # BLD AUTO: 2.88 M/UL (ref 4.6–6.2)
RBC # BLD AUTO: 2.88 M/UL (ref 4.6–6.2)
RBC # BLD AUTO: 2.89 M/UL (ref 4.6–6.2)
RBC # BLD AUTO: 2.92 M/UL (ref 4.6–6.2)
RBC # BLD AUTO: 2.94 M/UL (ref 4.6–6.2)
RBC # BLD AUTO: 2.98 M/UL (ref 4.6–6.2)
RBC # BLD AUTO: 2.99 M/UL (ref 4.6–6.2)
RBC # BLD AUTO: 3 M/UL (ref 4.6–6.2)
RBC # BLD AUTO: 3.02 M/UL (ref 4.6–6.2)
RBC # BLD AUTO: 3.1 M/UL (ref 4.6–6.2)
RBC # BLD AUTO: 3.11 M/UL (ref 4.6–6.2)
RBC # BLD AUTO: 3.12 M/UL (ref 4.6–6.2)
RBC # BLD AUTO: 3.13 M/UL (ref 4.6–6.2)
RBC # BLD AUTO: 3.15 M/UL (ref 4.6–6.2)
RBC # BLD AUTO: 3.17 M/UL (ref 4.6–6.2)
RBC # BLD AUTO: 3.2 M/UL (ref 4.6–6.2)
RBC # BLD AUTO: 3.21 M/UL (ref 4.6–6.2)
RBC # BLD AUTO: 3.21 M/UL (ref 4.6–6.2)
RBC # BLD AUTO: 3.22 M/UL (ref 4.6–6.2)
RBC # BLD AUTO: 3.23 M/UL (ref 4.6–6.2)
RBC # BLD AUTO: 3.25 M/UL (ref 4.6–6.2)
RBC # BLD AUTO: 3.28 M/UL (ref 4.6–6.2)
RBC # BLD AUTO: 3.33 M/UL (ref 4.6–6.2)
RBC # BLD AUTO: 3.36 M/UL (ref 4.6–6.2)
RBC # BLD AUTO: 3.37 M/UL (ref 4.6–6.2)
RBC # BLD AUTO: 3.39 M/UL (ref 4.6–6.2)
RBC # BLD AUTO: 3.45 M/UL (ref 4.6–6.2)
RBC # BLD AUTO: 3.45 M/UL (ref 4.6–6.2)
RBC # BLD AUTO: 3.46 M/UL (ref 4.6–6.2)
RBC # BLD AUTO: 3.47 M/UL (ref 4.6–6.2)
RBC # BLD AUTO: 3.48 M/UL (ref 4.6–6.2)
RBC # BLD AUTO: 3.51 M/UL (ref 4.6–6.2)
RBC # BLD AUTO: 3.53 M/UL (ref 4.6–6.2)
RBC # BLD AUTO: 3.6 M/UL (ref 4.6–6.2)
RBC # BLD AUTO: 3.64 M/UL (ref 4.6–6.2)
RBC # BLD AUTO: 3.72 M/UL (ref 4.6–6.2)
RBC # BLD AUTO: 3.8 M/UL (ref 4.6–6.2)
RBC # BLD AUTO: 3.81 M/UL (ref 4.6–6.2)
RBC # BLD AUTO: 3.87 M/UL (ref 4.6–6.2)
RBC # BLD AUTO: 4.07 M/UL (ref 4.6–6.2)
RBC # BLD AUTO: 4.18 M/UL (ref 4.6–6.2)
RBC # BLD AUTO: 4.25 M/UL (ref 4.6–6.2)
RBC # BLD AUTO: 4.39 M/UL (ref 4.6–6.2)
RBC # BLD AUTO: 4.51 M/UL (ref 4.6–6.2)
RIGHT VENTRICULAR END-DIASTOLIC DIMENSION: 4.2 CM
RIGHT VENTRICULAR END-DIASTOLIC DIMENSION: 4.5 CM
RIGHT VENTRICULAR END-DIASTOLIC DIMENSION: 4.84 CM
RSV AG BY MOLECULAR METHOD: NOT DETECTED
RV TISSUE DOPPLER FREE WALL SYSTOLIC VELOCITY 1 (APICAL 4 CHAMBER VIEW): 7.7 CM/S
SALMONELLA SP: NOT DETECTED
SAMPLE: ABNORMAL
SAMPLE: NORMAL
SAMPLE: NORMAL
SARS-COV-2 RNA RESP QL NAA+PROBE: DETECTED
SARS-COV-2 RNA RESP QL NAA+PROBE: NOT DETECTED
SATURATED IRON: 19 % (ref 20–50)
SATURATED IRON: 19 % (ref 20–50)
SCHISTOCYTES BLD QL SMEAR: ABNORMAL
SCHISTOCYTES BLD QL SMEAR: PRESENT
SERRATIA MARCESCENS: NOT DETECTED
SINUS: 2.78 CM
SINUS: 3.47 CM
SINUS: 3.48 CM
SITE: ABNORMAL
SITE: NORMAL
SITE: NORMAL
SODIUM BLD-SCNC: 129 MMOL/L (ref 136–145)
SODIUM BLD-SCNC: 130 MMOL/L (ref 136–145)
SODIUM BLD-SCNC: 131 MMOL/L (ref 136–145)
SODIUM BLD-SCNC: 133 MMOL/L (ref 136–145)
SODIUM BLD-SCNC: 134 MMOL/L (ref 136–145)
SODIUM BLD-SCNC: 134 MMOL/L (ref 136–145)
SODIUM BLD-SCNC: 135 MMOL/L (ref 136–145)
SODIUM BLD-SCNC: 135 MMOL/L (ref 136–145)
SODIUM BLD-SCNC: 136 MMOL/L (ref 136–145)
SODIUM BLD-SCNC: 136 MMOL/L (ref 136–145)
SODIUM SERPL-SCNC: 127 MMOL/L (ref 136–145)
SODIUM SERPL-SCNC: 129 MMOL/L (ref 136–145)
SODIUM SERPL-SCNC: 129 MMOL/L (ref 136–145)
SODIUM SERPL-SCNC: 130 MMOL/L (ref 136–145)
SODIUM SERPL-SCNC: 131 MMOL/L (ref 136–145)
SODIUM SERPL-SCNC: 132 MMOL/L (ref 136–145)
SODIUM SERPL-SCNC: 133 MMOL/L (ref 136–145)
SODIUM SERPL-SCNC: 134 MMOL/L (ref 136–145)
SODIUM SERPL-SCNC: 135 MMOL/L (ref 136–145)
SODIUM SERPL-SCNC: 136 MMOL/L (ref 136–145)
SODIUM SERPL-SCNC: 137 MMOL/L (ref 136–145)
SODIUM SERPL-SCNC: 138 MMOL/L (ref 136–145)
SODIUM SERPL-SCNC: 138 MMOL/L (ref 136–145)
SODIUM SERPL-SCNC: 139 MMOL/L (ref 136–145)
SODIUM SERPL-SCNC: 140 MMOL/L (ref 136–145)
SODIUM SERPL-SCNC: 141 MMOL/L (ref 136–145)
SP02: 100
SP02: 100
SP02: 91
SP02: 94
SP02: 94
SP02: 95
SP02: 96
SPECIMEN OUTDATE: NORMAL
SPECIMEN SOURCE: NORMAL
SPHEROCYTES BLD QL SMEAR: ABNORMAL
SPHEROCYTES BLD QL SMEAR: ABNORMAL
SRA 0.1IU/ML UFH SER-ACNC: 12 %
SRA 100IU/ML UFH SER-ACNC: 12 %
SRA INTERP SER-IMP: NORMAL
SRA UFH SER-IMP: NEGATIVE
STAPHYLOCOCCUS AUREUS: NOT DETECTED
STAPHYLOCOCCUS EPIDERMIDIS: NOT DETECTED
STAPHYLOCOCCUS LUGDUNESIS: NOT DETECTED
STAPHYLOCOCCUS SPECIES: NOT DETECTED
STENOTROPHOMONAS MALTOPHILIA: NOT DETECTED
STJ: 2.33 CM
STJ: 2.76 CM
STJ: 2.94 CM
STREPTOCOCCUS AGALACTIAE: NOT DETECTED
STREPTOCOCCUS PNEUMONIAE: NOT DETECTED
STREPTOCOCCUS PYOGENES: NOT DETECTED
STREPTOCOCCUS SPECIES: DETECTED
T4 FREE SERPL-MCNC: 1 NG/DL (ref 0.71–1.51)
TARGETS BLD QL SMEAR: ABNORMAL
TDI LATERAL: 0.03 M/S
TDI LATERAL: 0.09 M/S
TDI LATERAL: 0.1 M/S
TDI SEPTAL: 0.03 M/S
TDI SEPTAL: 0.05 M/S
TDI SEPTAL: 0.08 M/S
TDI: 0.03 M/S
TDI: 0.07 M/S
TDI: 0.09 M/S
TOTAL IRON BINDING CAPACITY: 203 UG/DL (ref 250–450)
TOTAL IRON BINDING CAPACITY: 249 UG/DL (ref 250–450)
TOXIC GRANULES BLD QL SMEAR: PRESENT
TR MAX PG: 27 MMHG
TR MAX PG: 39 MMHG
TR MAX PG: 47 MMHG
TRANS ERYTHROCYTES VOL PATIENT: NORMAL ML
TRANSFERRIN SERPL-MCNC: 137 MG/DL (ref 200–375)
TRANSFERRIN SERPL-MCNC: 168 MG/DL (ref 200–375)
TRICUSPID ANNULAR PLANE SYSTOLIC EXCURSION: 0.61 CM
TRICUSPID ANNULAR PLANE SYSTOLIC EXCURSION: 1.94 CM
TRICUSPID ANNULAR PLANE SYSTOLIC EXCURSION: 2.31 CM
TRIGL SERPL-MCNC: 30 MG/DL (ref 30–150)
TROPONIN I SERPL DL<=0.01 NG/ML-MCNC: 0.03 NG/ML (ref 0–0.03)
TROPONIN I SERPL DL<=0.01 NG/ML-MCNC: 0.04 NG/ML (ref 0–0.03)
TROPONIN I SERPL DL<=0.01 NG/ML-MCNC: 0.17 NG/ML (ref 0–0.03)
TROPONIN I SERPL DL<=0.01 NG/ML-MCNC: 0.17 NG/ML (ref 0–0.03)
TROPONIN I SERPL DL<=0.01 NG/ML-MCNC: 0.74 NG/ML (ref 0–0.03)
TSH SERPL DL<=0.005 MIU/L-ACNC: 0.85 UIU/ML (ref 0.4–4)
TSH SERPL DL<=0.005 MIU/L-ACNC: 0.94 UIU/ML (ref 0.4–4)
TSH SERPL DL<=0.005 MIU/L-ACNC: 0.96 UIU/ML (ref 0.4–4)
TSH SERPL DL<=0.005 MIU/L-ACNC: 1.09 UIU/ML (ref 0.4–4)
TV REST PULMONARY ARTERY PRESSURE: 54 MMHG
VAN A/B: NOT DETECTED
VANCOMYCIN SERPL-MCNC: 10.7 UG/ML
VANCOMYCIN SERPL-MCNC: 15.7 UG/ML
VANCOMYCIN SERPL-MCNC: 16.7 UG/ML
VANCOMYCIN SERPL-MCNC: 16.9 UG/ML
VANCOMYCIN SERPL-MCNC: 19 UG/ML
VANCOMYCIN SERPL-MCNC: 27.6 UG/ML
VIM GENE: ABNORMAL
VT: 400
WBC # BLD AUTO: 1.24 K/UL (ref 3.9–12.7)
WBC # BLD AUTO: 1.99 K/UL (ref 3.9–12.7)
WBC # BLD AUTO: 10.19 K/UL (ref 3.9–12.7)
WBC # BLD AUTO: 10.42 K/UL (ref 3.9–12.7)
WBC # BLD AUTO: 11.34 K/UL (ref 3.9–12.7)
WBC # BLD AUTO: 11.4 K/UL (ref 3.9–12.7)
WBC # BLD AUTO: 2.05 K/UL (ref 3.9–12.7)
WBC # BLD AUTO: 2.26 K/UL (ref 3.9–12.7)
WBC # BLD AUTO: 2.54 K/UL (ref 3.9–12.7)
WBC # BLD AUTO: 2.81 K/UL (ref 3.9–12.7)
WBC # BLD AUTO: 2.81 K/UL (ref 3.9–12.7)
WBC # BLD AUTO: 2.82 K/UL (ref 3.9–12.7)
WBC # BLD AUTO: 2.84 K/UL (ref 3.9–12.7)
WBC # BLD AUTO: 2.84 K/UL (ref 3.9–12.7)
WBC # BLD AUTO: 2.87 K/UL (ref 3.9–12.7)
WBC # BLD AUTO: 2.96 K/UL (ref 3.9–12.7)
WBC # BLD AUTO: 3.03 K/UL (ref 3.9–12.7)
WBC # BLD AUTO: 3.04 K/UL (ref 3.9–12.7)
WBC # BLD AUTO: 3.08 K/UL (ref 3.9–12.7)
WBC # BLD AUTO: 3.09 K/UL (ref 3.9–12.7)
WBC # BLD AUTO: 3.09 K/UL (ref 3.9–12.7)
WBC # BLD AUTO: 3.1 K/UL (ref 3.9–12.7)
WBC # BLD AUTO: 3.12 K/UL (ref 3.9–12.7)
WBC # BLD AUTO: 3.14 K/UL (ref 3.9–12.7)
WBC # BLD AUTO: 3.15 K/UL (ref 3.9–12.7)
WBC # BLD AUTO: 3.15 K/UL (ref 3.9–12.7)
WBC # BLD AUTO: 3.16 K/UL (ref 3.9–12.7)
WBC # BLD AUTO: 3.18 K/UL (ref 3.9–12.7)
WBC # BLD AUTO: 3.19 K/UL (ref 3.9–12.7)
WBC # BLD AUTO: 3.25 K/UL (ref 3.9–12.7)
WBC # BLD AUTO: 3.25 K/UL (ref 3.9–12.7)
WBC # BLD AUTO: 3.26 K/UL (ref 3.9–12.7)
WBC # BLD AUTO: 3.33 K/UL (ref 3.9–12.7)
WBC # BLD AUTO: 3.36 K/UL (ref 3.9–12.7)
WBC # BLD AUTO: 3.37 K/UL (ref 3.9–12.7)
WBC # BLD AUTO: 3.38 K/UL (ref 3.9–12.7)
WBC # BLD AUTO: 3.48 K/UL (ref 3.9–12.7)
WBC # BLD AUTO: 3.49 K/UL (ref 3.9–12.7)
WBC # BLD AUTO: 3.5 K/UL (ref 3.9–12.7)
WBC # BLD AUTO: 3.51 K/UL (ref 3.9–12.7)
WBC # BLD AUTO: 3.54 K/UL (ref 3.9–12.7)
WBC # BLD AUTO: 3.57 K/UL (ref 3.9–12.7)
WBC # BLD AUTO: 3.59 K/UL (ref 3.9–12.7)
WBC # BLD AUTO: 3.65 K/UL (ref 3.9–12.7)
WBC # BLD AUTO: 3.68 K/UL (ref 3.9–12.7)
WBC # BLD AUTO: 3.69 K/UL (ref 3.9–12.7)
WBC # BLD AUTO: 3.71 K/UL (ref 3.9–12.7)
WBC # BLD AUTO: 3.74 K/UL (ref 3.9–12.7)
WBC # BLD AUTO: 3.76 K/UL (ref 3.9–12.7)
WBC # BLD AUTO: 3.8 K/UL (ref 3.9–12.7)
WBC # BLD AUTO: 3.91 K/UL (ref 3.9–12.7)
WBC # BLD AUTO: 3.99 K/UL (ref 3.9–12.7)
WBC # BLD AUTO: 4 K/UL (ref 3.9–12.7)
WBC # BLD AUTO: 4.03 K/UL (ref 3.9–12.7)
WBC # BLD AUTO: 4.06 K/UL (ref 3.9–12.7)
WBC # BLD AUTO: 4.12 K/UL (ref 3.9–12.7)
WBC # BLD AUTO: 4.14 K/UL (ref 3.9–12.7)
WBC # BLD AUTO: 4.17 K/UL (ref 3.9–12.7)
WBC # BLD AUTO: 4.19 K/UL (ref 3.9–12.7)
WBC # BLD AUTO: 4.22 K/UL (ref 3.9–12.7)
WBC # BLD AUTO: 4.23 K/UL (ref 3.9–12.7)
WBC # BLD AUTO: 4.25 K/UL (ref 3.9–12.7)
WBC # BLD AUTO: 4.26 K/UL (ref 3.9–12.7)
WBC # BLD AUTO: 4.3 K/UL (ref 3.9–12.7)
WBC # BLD AUTO: 4.32 K/UL (ref 3.9–12.7)
WBC # BLD AUTO: 4.32 K/UL (ref 3.9–12.7)
WBC # BLD AUTO: 4.44 K/UL (ref 3.9–12.7)
WBC # BLD AUTO: 4.45 K/UL (ref 3.9–12.7)
WBC # BLD AUTO: 4.48 K/UL (ref 3.9–12.7)
WBC # BLD AUTO: 4.66 K/UL (ref 3.9–12.7)
WBC # BLD AUTO: 4.72 K/UL (ref 3.9–12.7)
WBC # BLD AUTO: 4.77 K/UL (ref 3.9–12.7)
WBC # BLD AUTO: 4.81 K/UL (ref 3.9–12.7)
WBC # BLD AUTO: 4.81 K/UL (ref 3.9–12.7)
WBC # BLD AUTO: 4.98 K/UL (ref 3.9–12.7)
WBC # BLD AUTO: 5.02 K/UL (ref 3.9–12.7)
WBC # BLD AUTO: 5.02 K/UL (ref 3.9–12.7)
WBC # BLD AUTO: 5.1 K/UL (ref 3.9–12.7)
WBC # BLD AUTO: 5.13 K/UL (ref 3.9–12.7)
WBC # BLD AUTO: 5.14 K/UL (ref 3.9–12.7)
WBC # BLD AUTO: 5.49 K/UL (ref 3.9–12.7)
WBC # BLD AUTO: 5.5 K/UL (ref 3.9–12.7)
WBC # BLD AUTO: 5.66 K/UL (ref 3.9–12.7)
WBC # BLD AUTO: 5.72 K/UL (ref 3.9–12.7)
WBC # BLD AUTO: 5.94 K/UL (ref 3.9–12.7)
WBC # BLD AUTO: 6.06 K/UL (ref 3.9–12.7)
WBC # BLD AUTO: 6.15 K/UL (ref 3.9–12.7)
WBC # BLD AUTO: 6.43 K/UL (ref 3.9–12.7)
WBC # BLD AUTO: 6.53 K/UL (ref 3.9–12.7)
WBC # BLD AUTO: 7.27 K/UL (ref 3.9–12.7)
WBC # BLD AUTO: 7.31 K/UL (ref 3.9–12.7)
WBC # BLD AUTO: 7.77 K/UL (ref 3.9–12.7)
WBC # BLD AUTO: 8.36 K/UL (ref 3.9–12.7)
WBC # BLD AUTO: 8.4 K/UL (ref 3.9–12.7)
WBC # BLD AUTO: 9.99 K/UL (ref 3.9–12.7)
WBC # FLD: 105 /CU MM
WBC # FLD: 310 /CU MM
Z-SCORE OF LEFT VENTRICULAR DIMENSION IN END DIASTOLE: -0.76
Z-SCORE OF LEFT VENTRICULAR DIMENSION IN END DIASTOLE: -2.73
Z-SCORE OF LEFT VENTRICULAR DIMENSION IN END SYSTOLE: 0.34
Z-SCORE OF LEFT VENTRICULAR DIMENSION IN END SYSTOLE: 0.63

## 2023-01-01 PROCEDURE — 80048 BASIC METABOLIC PNL TOTAL CA: CPT | Mod: 91,XB | Performed by: INTERNAL MEDICINE

## 2023-01-01 PROCEDURE — 99291 CRITICAL CARE FIRST HOUR: CPT | Mod: GC,,, | Performed by: INTERNAL MEDICINE

## 2023-01-01 PROCEDURE — 25000003 PHARM REV CODE 250: Performed by: INTERNAL MEDICINE

## 2023-01-01 PROCEDURE — 63600175 PHARM REV CODE 636 W HCPCS

## 2023-01-01 PROCEDURE — 25000003 PHARM REV CODE 250

## 2023-01-01 PROCEDURE — 94761 N-INVAS EAR/PLS OXIMETRY MLT: CPT

## 2023-01-01 PROCEDURE — 99291 PR CRITICAL CARE, E/M 30-74 MINUTES: ICD-10-PCS | Mod: GC,,, | Performed by: EMERGENCY MEDICINE

## 2023-01-01 PROCEDURE — 94003 VENT MGMT INPAT SUBQ DAY: CPT

## 2023-01-01 PROCEDURE — 36415 COLL VENOUS BLD VENIPUNCTURE: CPT

## 2023-01-01 PROCEDURE — 80053 COMPREHEN METABOLIC PANEL: CPT | Performed by: NURSE PRACTITIONER

## 2023-01-01 PROCEDURE — 51702 INSERT TEMP BLADDER CATH: CPT

## 2023-01-01 PROCEDURE — 90935 PR HEMODIALYSIS, ONE EVALUATION: ICD-10-PCS | Mod: ,,, | Performed by: NURSE PRACTITIONER

## 2023-01-01 PROCEDURE — 99223 1ST HOSP IP/OBS HIGH 75: CPT | Mod: ,,, | Performed by: NURSE PRACTITIONER

## 2023-01-01 PROCEDURE — 93010 EKG 12-LEAD: ICD-10-PCS | Mod: ,,, | Performed by: INTERNAL MEDICINE

## 2023-01-01 PROCEDURE — 99900035 HC TECH TIME PER 15 MIN (STAT)

## 2023-01-01 PROCEDURE — 87340 HEPATITIS B SURFACE AG IA: CPT | Performed by: INTERNAL MEDICINE

## 2023-01-01 PROCEDURE — 99214 PR OFFICE/OUTPT VISIT, EST, LEVL IV, 30-39 MIN: ICD-10-PCS | Mod: S$PBB,,, | Performed by: OPHTHALMOLOGY

## 2023-01-01 PROCEDURE — 25000003 PHARM REV CODE 250: Performed by: STUDENT IN AN ORGANIZED HEALTH CARE EDUCATION/TRAINING PROGRAM

## 2023-01-01 PROCEDURE — 25000242 PHARM REV CODE 250 ALT 637 W/ HCPCS

## 2023-01-01 PROCEDURE — 36415 COLL VENOUS BLD VENIPUNCTURE: CPT | Performed by: NURSE PRACTITIONER

## 2023-01-01 PROCEDURE — 25000003 PHARM REV CODE 250: Performed by: SURGERY

## 2023-01-01 PROCEDURE — 84100 ASSAY OF PHOSPHORUS: CPT | Performed by: INTERNAL MEDICINE

## 2023-01-01 PROCEDURE — 84100 ASSAY OF PHOSPHORUS: CPT | Performed by: STUDENT IN AN ORGANIZED HEALTH CARE EDUCATION/TRAINING PROGRAM

## 2023-01-01 PROCEDURE — 99999 PR PBB SHADOW E&M-EST. PATIENT-LVL III: ICD-10-PCS | Mod: PBBFAC,,, | Performed by: OPHTHALMOLOGY

## 2023-01-01 PROCEDURE — 20600001 HC STEP DOWN PRIVATE ROOM

## 2023-01-01 PROCEDURE — 84100 ASSAY OF PHOSPHORUS: CPT | Performed by: EMERGENCY MEDICINE

## 2023-01-01 PROCEDURE — 86920 COMPATIBILITY TEST SPIN: CPT | Performed by: STUDENT IN AN ORGANIZED HEALTH CARE EDUCATION/TRAINING PROGRAM

## 2023-01-01 PROCEDURE — 99499 UNLISTED E&M SERVICE: CPT | Mod: ,,, | Performed by: PHYSICIAN ASSISTANT

## 2023-01-01 PROCEDURE — 84100 ASSAY OF PHOSPHORUS: CPT

## 2023-01-01 PROCEDURE — 27000221 HC OXYGEN, UP TO 24 HOURS

## 2023-01-01 PROCEDURE — 99239 PR HOSPITAL DISCHARGE DAY,>30 MIN: ICD-10-PCS | Mod: GC,,, | Performed by: HOSPITALIST

## 2023-01-01 PROCEDURE — 90945 DIALYSIS ONE EVALUATION: CPT

## 2023-01-01 PROCEDURE — 80100016 HC MAINTENANCE HEMODIALYSIS

## 2023-01-01 PROCEDURE — 25000003 PHARM REV CODE 250: Performed by: NURSE PRACTITIONER

## 2023-01-01 PROCEDURE — 84100 ASSAY OF PHOSPHORUS: CPT | Performed by: NURSE PRACTITIONER

## 2023-01-01 PROCEDURE — G0378 HOSPITAL OBSERVATION PER HR: HCPCS

## 2023-01-01 PROCEDURE — 94640 AIRWAY INHALATION TREATMENT: CPT

## 2023-01-01 PROCEDURE — 99291 PR CRITICAL CARE, E/M 30-74 MINUTES: ICD-10-PCS | Mod: ,,, | Performed by: STUDENT IN AN ORGANIZED HEALTH CARE EDUCATION/TRAINING PROGRAM

## 2023-01-01 PROCEDURE — 83735 ASSAY OF MAGNESIUM: CPT

## 2023-01-01 PROCEDURE — 83735 ASSAY OF MAGNESIUM: CPT | Performed by: STUDENT IN AN ORGANIZED HEALTH CARE EDUCATION/TRAINING PROGRAM

## 2023-01-01 PROCEDURE — 99292 CRITICAL CARE ADDL 30 MIN: CPT | Mod: FS,25,, | Performed by: INTERNAL MEDICINE

## 2023-01-01 PROCEDURE — 11000001 HC ACUTE MED/SURG PRIVATE ROOM

## 2023-01-01 PROCEDURE — 83605 ASSAY OF LACTIC ACID: CPT | Performed by: STUDENT IN AN ORGANIZED HEALTH CARE EDUCATION/TRAINING PROGRAM

## 2023-01-01 PROCEDURE — 63600175 PHARM REV CODE 636 W HCPCS: Performed by: STUDENT IN AN ORGANIZED HEALTH CARE EDUCATION/TRAINING PROGRAM

## 2023-01-01 PROCEDURE — 83735 ASSAY OF MAGNESIUM: CPT | Performed by: NURSE PRACTITIONER

## 2023-01-01 PROCEDURE — 80053 COMPREHEN METABOLIC PANEL: CPT | Mod: 91 | Performed by: NURSE PRACTITIONER

## 2023-01-01 PROCEDURE — 63600175 PHARM REV CODE 636 W HCPCS: Mod: JZ,TB

## 2023-01-01 PROCEDURE — 99285 EMERGENCY DEPT VISIT HI MDM: CPT | Mod: 25

## 2023-01-01 PROCEDURE — 86900 BLOOD TYPING SEROLOGIC ABO: CPT | Performed by: STUDENT IN AN ORGANIZED HEALTH CARE EDUCATION/TRAINING PROGRAM

## 2023-01-01 PROCEDURE — 27100171 HC OXYGEN HIGH FLOW UP TO 24 HOURS

## 2023-01-01 PROCEDURE — 90935 PR HEMODIALYSIS, ONE EVALUATION: ICD-10-PCS | Mod: ,,, | Performed by: INTERNAL MEDICINE

## 2023-01-01 PROCEDURE — 99233 PR SUBSEQUENT HOSPITAL CARE,LEVL III: ICD-10-PCS | Mod: ,,, | Performed by: NURSE PRACTITIONER

## 2023-01-01 PROCEDURE — 99232 SBSQ HOSP IP/OBS MODERATE 35: CPT | Mod: GC,,, | Performed by: INTERNAL MEDICINE

## 2023-01-01 PROCEDURE — 82803 BLOOD GASES ANY COMBINATION: CPT

## 2023-01-01 PROCEDURE — 96365 THER/PROPH/DIAG IV INF INIT: CPT

## 2023-01-01 PROCEDURE — 25800024 PHARM REV CODE 258

## 2023-01-01 PROCEDURE — 99232 PR SUBSEQUENT HOSPITAL CARE,LEVL II: ICD-10-PCS | Mod: ,,, | Performed by: INTERNAL MEDICINE

## 2023-01-01 PROCEDURE — 99233 SBSQ HOSP IP/OBS HIGH 50: CPT | Mod: ,,, | Performed by: INTERNAL MEDICINE

## 2023-01-01 PROCEDURE — 96365 THER/PROPH/DIAG IV INF INIT: CPT | Mod: 59

## 2023-01-01 PROCEDURE — 99233 SBSQ HOSP IP/OBS HIGH 50: CPT | Mod: GC,,, | Performed by: INTERNAL MEDICINE

## 2023-01-01 PROCEDURE — 76937 US GUIDE VASCULAR ACCESS: CPT

## 2023-01-01 PROCEDURE — 85025 COMPLETE CBC W/AUTO DIFF WBC: CPT | Performed by: STUDENT IN AN ORGANIZED HEALTH CARE EDUCATION/TRAINING PROGRAM

## 2023-01-01 PROCEDURE — 99285 EMERGENCY DEPT VISIT HI MDM: CPT | Mod: ,,, | Performed by: EMERGENCY MEDICINE

## 2023-01-01 PROCEDURE — 27200966 HC CLOSED SUCTION SYSTEM

## 2023-01-01 PROCEDURE — 85025 COMPLETE CBC W/AUTO DIFF WBC: CPT | Performed by: EMERGENCY MEDICINE

## 2023-01-01 PROCEDURE — 83036 HEMOGLOBIN GLYCOSYLATED A1C: CPT

## 2023-01-01 PROCEDURE — 99232 SBSQ HOSP IP/OBS MODERATE 35: CPT | Mod: ,,, | Performed by: INTERNAL MEDICINE

## 2023-01-01 PROCEDURE — 63600175 PHARM REV CODE 636 W HCPCS: Mod: JZ | Performed by: STUDENT IN AN ORGANIZED HEALTH CARE EDUCATION/TRAINING PROGRAM

## 2023-01-01 PROCEDURE — 96372 THER/PROPH/DIAG INJ SC/IM: CPT

## 2023-01-01 PROCEDURE — 63600175 PHARM REV CODE 636 W HCPCS: Performed by: EMERGENCY MEDICINE

## 2023-01-01 PROCEDURE — 99233 PR SUBSEQUENT HOSPITAL CARE,LEVL III: ICD-10-PCS | Mod: GC,,, | Performed by: EMERGENCY MEDICINE

## 2023-01-01 PROCEDURE — 25000003 PHARM REV CODE 250: Performed by: EMERGENCY MEDICINE

## 2023-01-01 PROCEDURE — 25000242 PHARM REV CODE 250 ALT 637 W/ HCPCS: Performed by: EMERGENCY MEDICINE

## 2023-01-01 PROCEDURE — 99232 PR SUBSEQUENT HOSPITAL CARE,LEVL II: ICD-10-PCS | Mod: ,,, | Performed by: HOSPITALIST

## 2023-01-01 PROCEDURE — 80307 DRUG TEST PRSMV CHEM ANLYZR: CPT | Performed by: NURSE PRACTITIONER

## 2023-01-01 PROCEDURE — 82024 ASSAY OF ACTH: CPT | Performed by: INTERNAL MEDICINE

## 2023-01-01 PROCEDURE — 85025 COMPLETE CBC W/AUTO DIFF WBC: CPT | Performed by: INTERNAL MEDICINE

## 2023-01-01 PROCEDURE — 25000003 PHARM REV CODE 250: Performed by: HOSPITALIST

## 2023-01-01 PROCEDURE — 99223 PR INITIAL HOSPITAL CARE,LEVL III: ICD-10-PCS | Mod: 25,,, | Performed by: NURSE PRACTITIONER

## 2023-01-01 PROCEDURE — 99222 1ST HOSP IP/OBS MODERATE 55: CPT | Mod: GC,,, | Performed by: INTERNAL MEDICINE

## 2023-01-01 PROCEDURE — 99291 PR CRITICAL CARE, E/M 30-74 MINUTES: ICD-10-PCS | Mod: ,,,

## 2023-01-01 PROCEDURE — 96372 THER/PROPH/DIAG INJ SC/IM: CPT | Mod: 59

## 2023-01-01 PROCEDURE — 82728 ASSAY OF FERRITIN: CPT | Performed by: NURSE PRACTITIONER

## 2023-01-01 PROCEDURE — U0005 INFEC AGEN DETEC AMPLI PROBE: HCPCS | Performed by: STUDENT IN AN ORGANIZED HEALTH CARE EDUCATION/TRAINING PROGRAM

## 2023-01-01 PROCEDURE — 93005 ELECTROCARDIOGRAM TRACING: CPT

## 2023-01-01 PROCEDURE — 95714 VEEG EA 12-26 HR UNMNTR: CPT

## 2023-01-01 PROCEDURE — 83690 ASSAY OF LIPASE: CPT | Performed by: EMERGENCY MEDICINE

## 2023-01-01 PROCEDURE — 80053 COMPREHEN METABOLIC PANEL: CPT

## 2023-01-01 PROCEDURE — 99233 PR SUBSEQUENT HOSPITAL CARE,LEVL III: ICD-10-PCS | Mod: ,,, | Performed by: INTERNAL MEDICINE

## 2023-01-01 PROCEDURE — 85025 COMPLETE CBC W/AUTO DIFF WBC: CPT | Performed by: PHYSICIAN ASSISTANT

## 2023-01-01 PROCEDURE — 82962 GLUCOSE BLOOD TEST: CPT

## 2023-01-01 PROCEDURE — 85025 COMPLETE CBC W/AUTO DIFF WBC: CPT | Mod: 91 | Performed by: NURSE PRACTITIONER

## 2023-01-01 PROCEDURE — 93010 ELECTROCARDIOGRAM REPORT: CPT | Mod: ,,, | Performed by: INTERNAL MEDICINE

## 2023-01-01 PROCEDURE — 85025 COMPLETE CBC W/AUTO DIFF WBC: CPT | Performed by: NURSE PRACTITIONER

## 2023-01-01 PROCEDURE — 27000207 HC ISOLATION

## 2023-01-01 PROCEDURE — 36600 WITHDRAWAL OF ARTERIAL BLOOD: CPT

## 2023-01-01 PROCEDURE — 82010 KETONE BODYS QUAN: CPT | Performed by: STUDENT IN AN ORGANIZED HEALTH CARE EDUCATION/TRAINING PROGRAM

## 2023-01-01 PROCEDURE — 93990 DOPPLER FLOW TESTING: CPT | Performed by: SURGERY

## 2023-01-01 PROCEDURE — 99900026 HC AIRWAY MAINTENANCE (STAT)

## 2023-01-01 PROCEDURE — 99232 SBSQ HOSP IP/OBS MODERATE 35: CPT | Mod: 95,,, | Performed by: HOSPITALIST

## 2023-01-01 PROCEDURE — 95720 PR EEG, W/VIDEO, CONT RECORD, I&R, >12<26 HRS: ICD-10-PCS | Mod: ,,, | Performed by: PSYCHIATRY & NEUROLOGY

## 2023-01-01 PROCEDURE — 99233 SBSQ HOSP IP/OBS HIGH 50: CPT | Mod: 95,,, | Performed by: STUDENT IN AN ORGANIZED HEALTH CARE EDUCATION/TRAINING PROGRAM

## 2023-01-01 PROCEDURE — 80100014 HC HEMODIALYSIS 1:1

## 2023-01-01 PROCEDURE — 20000000 HC ICU ROOM

## 2023-01-01 PROCEDURE — 84132 ASSAY OF SERUM POTASSIUM: CPT

## 2023-01-01 PROCEDURE — 87205 SMEAR GRAM STAIN: CPT

## 2023-01-01 PROCEDURE — 99232 PR SUBSEQUENT HOSPITAL CARE,LEVL II: ICD-10-PCS | Mod: ,,, | Performed by: NURSE PRACTITIONER

## 2023-01-01 PROCEDURE — 99291 PR CRITICAL CARE, E/M 30-74 MINUTES: ICD-10-PCS | Mod: GC,,, | Performed by: INTERNAL MEDICINE

## 2023-01-01 PROCEDURE — 99239 HOSP IP/OBS DSCHRG MGMT >30: CPT | Mod: ,,, | Performed by: HOSPITALIST

## 2023-01-01 PROCEDURE — 83735 ASSAY OF MAGNESIUM: CPT | Mod: 91 | Performed by: STUDENT IN AN ORGANIZED HEALTH CARE EDUCATION/TRAINING PROGRAM

## 2023-01-01 PROCEDURE — 96375 TX/PRO/DX INJ NEW DRUG ADDON: CPT | Mod: 59

## 2023-01-01 PROCEDURE — 85025 COMPLETE CBC W/AUTO DIFF WBC: CPT

## 2023-01-01 PROCEDURE — 80202 ASSAY OF VANCOMYCIN: CPT | Performed by: INTERNAL MEDICINE

## 2023-01-01 PROCEDURE — 90935 HEMODIALYSIS ONE EVALUATION: CPT | Mod: ,,, | Performed by: NURSE PRACTITIONER

## 2023-01-01 PROCEDURE — 21400001 HC TELEMETRY ROOM

## 2023-01-01 PROCEDURE — 80053 COMPREHEN METABOLIC PANEL: CPT | Performed by: EMERGENCY MEDICINE

## 2023-01-01 PROCEDURE — 99239 HOSP IP/OBS DSCHRG MGMT >30: CPT | Mod: GC,,, | Performed by: HOSPITALIST

## 2023-01-01 PROCEDURE — 99232 PR SUBSEQUENT HOSPITAL CARE,LEVL II: ICD-10-PCS | Mod: GC,,, | Performed by: INTERNAL MEDICINE

## 2023-01-01 PROCEDURE — 85014 HEMATOCRIT: CPT

## 2023-01-01 PROCEDURE — 87040 BLOOD CULTURE FOR BACTERIA: CPT | Mod: 59 | Performed by: EMERGENCY MEDICINE

## 2023-01-01 PROCEDURE — 80053 COMPREHEN METABOLIC PANEL: CPT | Performed by: HOSPITALIST

## 2023-01-01 PROCEDURE — 96376 TX/PRO/DX INJ SAME DRUG ADON: CPT

## 2023-01-01 PROCEDURE — 99291 CRITICAL CARE FIRST HOUR: CPT | Mod: GC,,, | Performed by: EMERGENCY MEDICINE

## 2023-01-01 PROCEDURE — C9113 INJ PANTOPRAZOLE SODIUM, VIA: HCPCS | Performed by: EMERGENCY MEDICINE

## 2023-01-01 PROCEDURE — 99239 HOSP IP/OBS DSCHRG MGMT >30: CPT | Mod: ,,, | Performed by: INTERNAL MEDICINE

## 2023-01-01 PROCEDURE — 99223 1ST HOSP IP/OBS HIGH 75: CPT | Mod: ,,, | Performed by: CLINICAL NURSE SPECIALIST

## 2023-01-01 PROCEDURE — 86301 IMMUNOASSAY TUMOR CA 19-9: CPT | Performed by: STUDENT IN AN ORGANIZED HEALTH CARE EDUCATION/TRAINING PROGRAM

## 2023-01-01 PROCEDURE — 94660 CPAP INITIATION&MGMT: CPT

## 2023-01-01 PROCEDURE — 25000242 PHARM REV CODE 250 ALT 637 W/ HCPCS: Performed by: INTERNAL MEDICINE

## 2023-01-01 PROCEDURE — 83605 ASSAY OF LACTIC ACID: CPT

## 2023-01-01 PROCEDURE — 84100 ASSAY OF PHOSPHORUS: CPT | Performed by: HOSPITALIST

## 2023-01-01 PROCEDURE — 99499 NO LOS: ICD-10-PCS | Mod: ,,, | Performed by: PHYSICIAN ASSISTANT

## 2023-01-01 PROCEDURE — 25000003 PHARM REV CODE 250: Performed by: PHYSICIAN ASSISTANT

## 2023-01-01 PROCEDURE — 84484 ASSAY OF TROPONIN QUANT: CPT | Performed by: STUDENT IN AN ORGANIZED HEALTH CARE EDUCATION/TRAINING PROGRAM

## 2023-01-01 PROCEDURE — 99233 PR SUBSEQUENT HOSPITAL CARE,LEVL III: ICD-10-PCS | Mod: GC,,, | Performed by: INTERNAL MEDICINE

## 2023-01-01 PROCEDURE — 36415 COLL VENOUS BLD VENIPUNCTURE: CPT | Performed by: STUDENT IN AN ORGANIZED HEALTH CARE EDUCATION/TRAINING PROGRAM

## 2023-01-01 PROCEDURE — 99285 EMERGENCY DEPT VISIT HI MDM: CPT

## 2023-01-01 PROCEDURE — 80069 RENAL FUNCTION PANEL: CPT | Performed by: STUDENT IN AN ORGANIZED HEALTH CARE EDUCATION/TRAINING PROGRAM

## 2023-01-01 PROCEDURE — 84484 ASSAY OF TROPONIN QUANT: CPT | Mod: 91 | Performed by: PHYSICIAN ASSISTANT

## 2023-01-01 PROCEDURE — 82042 OTHER SOURCE ALBUMIN QUAN EA: CPT | Performed by: INTERNAL MEDICINE

## 2023-01-01 PROCEDURE — 92201 OPSCPY EXTND RTA DRAW UNI/BI: CPT | Mod: S$PBB,,, | Performed by: OPHTHALMOLOGY

## 2023-01-01 PROCEDURE — 84157 ASSAY OF PROTEIN OTHER: CPT

## 2023-01-01 PROCEDURE — 99222 PR INITIAL HOSPITAL CARE,LEVL II: ICD-10-PCS | Mod: 25,,, | Performed by: SURGERY

## 2023-01-01 PROCEDURE — 97535 SELF CARE MNGMENT TRAINING: CPT

## 2023-01-01 PROCEDURE — 63600175 PHARM REV CODE 636 W HCPCS: Performed by: NURSE PRACTITIONER

## 2023-01-01 PROCEDURE — 99239 PR HOSPITAL DISCHARGE DAY,>30 MIN: ICD-10-PCS | Mod: ,,, | Performed by: HOSPITALIST

## 2023-01-01 PROCEDURE — 87502 INFLUENZA DNA AMP PROBE: CPT

## 2023-01-01 PROCEDURE — 84157 ASSAY OF PROTEIN OTHER: CPT | Performed by: INTERNAL MEDICINE

## 2023-01-01 PROCEDURE — 95700 EEG CONT REC W/VID EEG TECH: CPT

## 2023-01-01 PROCEDURE — 84520 ASSAY OF UREA NITROGEN: CPT | Mod: 91 | Performed by: NURSE PRACTITIONER

## 2023-01-01 PROCEDURE — 87040 BLOOD CULTURE FOR BACTERIA: CPT | Mod: 59

## 2023-01-01 PROCEDURE — 87340 HEPATITIS B SURFACE AG IA: CPT | Performed by: STUDENT IN AN ORGANIZED HEALTH CARE EDUCATION/TRAINING PROGRAM

## 2023-01-01 PROCEDURE — 87340 HEPATITIS B SURFACE AG IA: CPT | Performed by: HOSPITALIST

## 2023-01-01 PROCEDURE — 99152 PR MOD CONSCIOUS SEDATION, SAME PHYS, 5+ YRS, FIRST 15 MIN: ICD-10-PCS | Mod: ,,, | Performed by: SURGERY

## 2023-01-01 PROCEDURE — 87077 CULTURE AEROBIC IDENTIFY: CPT

## 2023-01-01 PROCEDURE — C1769 GUIDE WIRE: HCPCS | Performed by: SURGERY

## 2023-01-01 PROCEDURE — 92201 PR OPHTHALMOSCOPY, EXT, W/RET DRAW/SCLERAL DEPR, I&R, UNI/BI: ICD-10-PCS | Mod: 59,S$PBB,, | Performed by: OPHTHALMOLOGY

## 2023-01-01 PROCEDURE — 36556 INSERT NON-TUNNEL CV CATH: CPT

## 2023-01-01 PROCEDURE — 99239 PR HOSPITAL DISCHARGE DAY,>30 MIN: ICD-10-PCS | Mod: ,,, | Performed by: INTERNAL MEDICINE

## 2023-01-01 PROCEDURE — 37000008 HC ANESTHESIA 1ST 15 MINUTES: Performed by: SURGERY

## 2023-01-01 PROCEDURE — 99239 HOSP IP/OBS DSCHRG MGMT >30: CPT | Mod: ,,,

## 2023-01-01 PROCEDURE — 99222 1ST HOSP IP/OBS MODERATE 55: CPT | Mod: ,,, | Performed by: INTERNAL MEDICINE

## 2023-01-01 PROCEDURE — 99223 PR INITIAL HOSPITAL CARE,LEVL III: ICD-10-PCS | Mod: ,,, | Performed by: NURSE PRACTITIONER

## 2023-01-01 PROCEDURE — 99223 1ST HOSP IP/OBS HIGH 75: CPT | Mod: GC,,, | Performed by: STUDENT IN AN ORGANIZED HEALTH CARE EDUCATION/TRAINING PROGRAM

## 2023-01-01 PROCEDURE — 83735 ASSAY OF MAGNESIUM: CPT | Performed by: PHYSICIAN ASSISTANT

## 2023-01-01 PROCEDURE — G0257 UNSCHED DIALYSIS ESRD PT HOS: HCPCS

## 2023-01-01 PROCEDURE — 92134 OCT, RETINA - OU - BOTH EYES: ICD-10-PCS | Mod: 26,S$PBB,, | Performed by: OPHTHALMOLOGY

## 2023-01-01 PROCEDURE — 84295 ASSAY OF SERUM SODIUM: CPT

## 2023-01-01 PROCEDURE — 99233 SBSQ HOSP IP/OBS HIGH 50: CPT | Mod: GC,,, | Performed by: HOSPITALIST

## 2023-01-01 PROCEDURE — 85730 THROMBOPLASTIN TIME PARTIAL: CPT | Performed by: HOSPITALIST

## 2023-01-01 PROCEDURE — 99213 PR OFFICE/OUTPT VISIT, EST, LEVL III, 20-29 MIN: ICD-10-PCS | Mod: S$PBB,,, | Performed by: NURSE PRACTITIONER

## 2023-01-01 PROCEDURE — 82330 ASSAY OF CALCIUM: CPT

## 2023-01-01 PROCEDURE — 80053 COMPREHEN METABOLIC PANEL: CPT | Performed by: STUDENT IN AN ORGANIZED HEALTH CARE EDUCATION/TRAINING PROGRAM

## 2023-01-01 PROCEDURE — P9047 ALBUMIN (HUMAN), 25%, 50ML: HCPCS | Mod: JG

## 2023-01-01 PROCEDURE — 80048 BASIC METABOLIC PNL TOTAL CA: CPT | Mod: XB | Performed by: INTERNAL MEDICINE

## 2023-01-01 PROCEDURE — 99999 PR PBB SHADOW E&M-EST. PATIENT-LVL III: CPT | Mod: PBBFAC,,, | Performed by: OPHTHALMOLOGY

## 2023-01-01 PROCEDURE — 99232 SBSQ HOSP IP/OBS MODERATE 35: CPT | Mod: ,,, | Performed by: HOSPITALIST

## 2023-01-01 PROCEDURE — 88112 CYTOPATH CELL ENHANCE TECH: CPT | Mod: 26,,, | Performed by: STUDENT IN AN ORGANIZED HEALTH CARE EDUCATION/TRAINING PROGRAM

## 2023-01-01 PROCEDURE — 99223 1ST HOSP IP/OBS HIGH 75: CPT | Mod: AI,GC,, | Performed by: STUDENT IN AN ORGANIZED HEALTH CARE EDUCATION/TRAINING PROGRAM

## 2023-01-01 PROCEDURE — 85027 COMPLETE CBC AUTOMATED: CPT | Performed by: NURSE PRACTITIONER

## 2023-01-01 PROCEDURE — 84484 ASSAY OF TROPONIN QUANT: CPT | Performed by: EMERGENCY MEDICINE

## 2023-01-01 PROCEDURE — 99285 PR EMERGENCY DEPT VISIT,LEVEL V: ICD-10-PCS | Mod: ,,, | Performed by: EMERGENCY MEDICINE

## 2023-01-01 PROCEDURE — 99222 PR INITIAL HOSPITAL CARE,LEVL II: ICD-10-PCS | Mod: ,,, | Performed by: INTERNAL MEDICINE

## 2023-01-01 PROCEDURE — 96374 THER/PROPH/DIAG INJ IV PUSH: CPT

## 2023-01-01 PROCEDURE — 99233 PR SUBSEQUENT HOSPITAL CARE,LEVL III: ICD-10-PCS | Mod: GC,,, | Performed by: HOSPITALIST

## 2023-01-01 PROCEDURE — 82105 ALPHA-FETOPROTEIN SERUM: CPT | Performed by: STUDENT IN AN ORGANIZED HEALTH CARE EDUCATION/TRAINING PROGRAM

## 2023-01-01 PROCEDURE — 86706 HEP B SURFACE ANTIBODY: CPT | Mod: 91 | Performed by: HOSPITALIST

## 2023-01-01 PROCEDURE — 36415 COLL VENOUS BLD VENIPUNCTURE: CPT | Performed by: HOSPITALIST

## 2023-01-01 PROCEDURE — 80100008 HC CRRT DAILY MAINTENANCE

## 2023-01-01 PROCEDURE — 99284 PR EMERGENCY DEPT VISIT,LEVEL IV: ICD-10-PCS | Mod: ,,, | Performed by: EMERGENCY MEDICINE

## 2023-01-01 PROCEDURE — 36430 TRANSFUSION BLD/BLD COMPNT: CPT

## 2023-01-01 PROCEDURE — 82140 ASSAY OF AMMONIA: CPT | Performed by: EMERGENCY MEDICINE

## 2023-01-01 PROCEDURE — 99999 PR PBB SHADOW E&M-EST. PATIENT-LVL V: CPT | Mod: PBBFAC,,, | Performed by: NURSE PRACTITIONER

## 2023-01-01 PROCEDURE — 99213 OFFICE O/P EST LOW 20 MIN: CPT | Mod: PBBFAC,25 | Performed by: OPHTHALMOLOGY

## 2023-01-01 PROCEDURE — 27000190 HC CPAP FULL FACE MASK W/VALVE

## 2023-01-01 PROCEDURE — 96372 THER/PROPH/DIAG INJ SC/IM: CPT | Performed by: PHYSICIAN ASSISTANT

## 2023-01-01 PROCEDURE — 25500020 PHARM REV CODE 255: Performed by: EMERGENCY MEDICINE

## 2023-01-01 PROCEDURE — 82378 CARCINOEMBRYONIC ANTIGEN: CPT | Performed by: STUDENT IN AN ORGANIZED HEALTH CARE EDUCATION/TRAINING PROGRAM

## 2023-01-01 PROCEDURE — P9016 RBC LEUKOCYTES REDUCED: HCPCS | Performed by: STUDENT IN AN ORGANIZED HEALTH CARE EDUCATION/TRAINING PROGRAM

## 2023-01-01 PROCEDURE — 87186 SC STD MICRODIL/AGAR DIL: CPT | Mod: 59 | Performed by: EMERGENCY MEDICINE

## 2023-01-01 PROCEDURE — 99291 CRITICAL CARE FIRST HOUR: CPT

## 2023-01-01 PROCEDURE — 63600175 PHARM REV CODE 636 W HCPCS: Performed by: NURSE ANESTHETIST, CERTIFIED REGISTERED

## 2023-01-01 PROCEDURE — 36901 PR INTRO CATH, DIALYSIS CIRCUIT: ICD-10-PCS | Mod: GC,,, | Performed by: SURGERY

## 2023-01-01 PROCEDURE — 0241U SARS-COV2 (COVID) WITH FLU/RSV BY PCR: CPT | Performed by: INTERNAL MEDICINE

## 2023-01-01 PROCEDURE — 99291 CRITICAL CARE FIRST HOUR: CPT | Mod: ,,, | Performed by: STUDENT IN AN ORGANIZED HEALTH CARE EDUCATION/TRAINING PROGRAM

## 2023-01-01 PROCEDURE — 99283 EMERGENCY DEPT VISIT LOW MDM: CPT | Mod: FS,,, | Performed by: EMERGENCY MEDICINE

## 2023-01-01 PROCEDURE — 99223 1ST HOSP IP/OBS HIGH 75: CPT | Mod: ,,,

## 2023-01-01 PROCEDURE — 92201 PR OPHTHALMOSCOPY, EXT, W/RET DRAW/SCLERAL DEPR, I&R, UNI/BI: ICD-10-PCS | Mod: S$PBB,,, | Performed by: OPHTHALMOLOGY

## 2023-01-01 PROCEDURE — 36000706: Performed by: SURGERY

## 2023-01-01 PROCEDURE — 96372 THER/PROPH/DIAG INJ SC/IM: CPT | Performed by: NURSE PRACTITIONER

## 2023-01-01 PROCEDURE — 87040 BLOOD CULTURE FOR BACTERIA: CPT | Mod: 59 | Performed by: INTERNAL MEDICINE

## 2023-01-01 PROCEDURE — 85007 BL SMEAR W/DIFF WBC COUNT: CPT | Performed by: NURSE PRACTITIONER

## 2023-01-01 PROCEDURE — 99213 OFFICE O/P EST LOW 20 MIN: CPT | Mod: S$PBB,,, | Performed by: NURSE PRACTITIONER

## 2023-01-01 PROCEDURE — 63600175 PHARM REV CODE 636 W HCPCS: Mod: JZ | Performed by: NURSE PRACTITIONER

## 2023-01-01 PROCEDURE — 63600175 PHARM REV CODE 636 W HCPCS: Performed by: INTERNAL MEDICINE

## 2023-01-01 PROCEDURE — 99233 SBSQ HOSP IP/OBS HIGH 50: CPT | Mod: ,,, | Performed by: NURSE PRACTITIONER

## 2023-01-01 PROCEDURE — 83880 ASSAY OF NATRIURETIC PEPTIDE: CPT | Performed by: EMERGENCY MEDICINE

## 2023-01-01 PROCEDURE — 83735 ASSAY OF MAGNESIUM: CPT | Mod: 91 | Performed by: EMERGENCY MEDICINE

## 2023-01-01 PROCEDURE — 85018 HEMOGLOBIN: CPT | Performed by: HOSPITALIST

## 2023-01-01 PROCEDURE — 99223 PR INITIAL HOSPITAL CARE,LEVL III: ICD-10-PCS | Mod: ,,, | Performed by: PHYSICIAN ASSISTANT

## 2023-01-01 PROCEDURE — 84439 ASSAY OF FREE THYROXINE: CPT | Performed by: EMERGENCY MEDICINE

## 2023-01-01 PROCEDURE — 87186 SC STD MICRODIL/AGAR DIL: CPT

## 2023-01-01 PROCEDURE — 25000242 PHARM REV CODE 250 ALT 637 W/ HCPCS: Performed by: HOSPITALIST

## 2023-01-01 PROCEDURE — 27201423 OPTIME MED/SURG SUP & DEVICES STERILE SUPPLY: Performed by: SURGERY

## 2023-01-01 PROCEDURE — 36415 COLL VENOUS BLD VENIPUNCTURE: CPT | Performed by: PHYSICIAN ASSISTANT

## 2023-01-01 PROCEDURE — 63600531 PHARM REV CODE 636 NO ALT 250 W HCPCS: Mod: JZ,JG | Performed by: EMERGENCY MEDICINE

## 2023-01-01 PROCEDURE — 99232 PR SUBSEQUENT HOSPITAL CARE,LEVL II: ICD-10-PCS | Mod: GC,,, | Performed by: HOSPITALIST

## 2023-01-01 PROCEDURE — 36901 PR INTRO CATH, DIALYSIS CIRCUIT: ICD-10-PCS | Mod: ,,, | Performed by: SURGERY

## 2023-01-01 PROCEDURE — 80048 BASIC METABOLIC PNL TOTAL CA: CPT | Mod: XB

## 2023-01-01 PROCEDURE — 99291 CRITICAL CARE FIRST HOUR: CPT | Mod: CS,,, | Performed by: EMERGENCY MEDICINE

## 2023-01-01 PROCEDURE — 97165 OT EVAL LOW COMPLEX 30 MIN: CPT

## 2023-01-01 PROCEDURE — 99232 PR SUBSEQUENT HOSPITAL CARE,LEVL II: ICD-10-PCS | Mod: 95,,, | Performed by: HOSPITALIST

## 2023-01-01 PROCEDURE — 83735 ASSAY OF MAGNESIUM: CPT | Performed by: INTERNAL MEDICINE

## 2023-01-01 PROCEDURE — P9016 RBC LEUKOCYTES REDUCED: HCPCS

## 2023-01-01 PROCEDURE — 83880 ASSAY OF NATRIURETIC PEPTIDE: CPT | Performed by: STUDENT IN AN ORGANIZED HEALTH CARE EDUCATION/TRAINING PROGRAM

## 2023-01-01 PROCEDURE — 96375 TX/PRO/DX INJ NEW DRUG ADDON: CPT

## 2023-01-01 PROCEDURE — 99223 1ST HOSP IP/OBS HIGH 75: CPT | Mod: ,,, | Performed by: INTERNAL MEDICINE

## 2023-01-01 PROCEDURE — 83036 HEMOGLOBIN GLYCOSYLATED A1C: CPT | Performed by: PHYSICIAN ASSISTANT

## 2023-01-01 PROCEDURE — 84484 ASSAY OF TROPONIN QUANT: CPT | Mod: 91 | Performed by: STUDENT IN AN ORGANIZED HEALTH CARE EDUCATION/TRAINING PROGRAM

## 2023-01-01 PROCEDURE — 99223 PR INITIAL HOSPITAL CARE,LEVL III: ICD-10-PCS | Mod: ,,, | Performed by: STUDENT IN AN ORGANIZED HEALTH CARE EDUCATION/TRAINING PROGRAM

## 2023-01-01 PROCEDURE — 92201 OPSCPY EXTND RTA DRAW UNI/BI: CPT | Mod: PBBFAC | Performed by: OPHTHALMOLOGY

## 2023-01-01 PROCEDURE — 99214 OFFICE O/P EST MOD 30 MIN: CPT | Mod: S$PBB,,, | Performed by: OPHTHALMOLOGY

## 2023-01-01 PROCEDURE — 99223 PR INITIAL HOSPITAL CARE,LEVL III: ICD-10-PCS | Mod: AI,GC,, | Performed by: STUDENT IN AN ORGANIZED HEALTH CARE EDUCATION/TRAINING PROGRAM

## 2023-01-01 PROCEDURE — 85014 HEMATOCRIT: CPT | Performed by: HOSPITALIST

## 2023-01-01 PROCEDURE — 85610 PROTHROMBIN TIME: CPT | Performed by: EMERGENCY MEDICINE

## 2023-01-01 PROCEDURE — 87075 CULTR BACTERIA EXCEPT BLOOD: CPT

## 2023-01-01 PROCEDURE — 80047 BASIC METABLC PNL IONIZED CA: CPT

## 2023-01-01 PROCEDURE — 83930 ASSAY OF BLOOD OSMOLALITY: CPT | Performed by: EMERGENCY MEDICINE

## 2023-01-01 PROCEDURE — 99900 PR LEFT WITHOUTBEING SEEN-EMERGENCY: ICD-10-PCS | Mod: ,,, | Performed by: EMERGENCY MEDICINE

## 2023-01-01 PROCEDURE — 31500 PR INSERT, EMERGENCY ENDOTRACH AIRWAY: ICD-10-PCS | Mod: GC,,, | Performed by: INTERNAL MEDICINE

## 2023-01-01 PROCEDURE — 97530 THERAPEUTIC ACTIVITIES: CPT

## 2023-01-01 PROCEDURE — 99223 1ST HOSP IP/OBS HIGH 75: CPT | Mod: ,,, | Performed by: STUDENT IN AN ORGANIZED HEALTH CARE EDUCATION/TRAINING PROGRAM

## 2023-01-01 PROCEDURE — 84520 ASSAY OF UREA NITROGEN: CPT | Performed by: NURSE PRACTITIONER

## 2023-01-01 PROCEDURE — 25000242 PHARM REV CODE 250 ALT 637 W/ HCPCS: Performed by: PHYSICIAN ASSISTANT

## 2023-01-01 PROCEDURE — 94002 VENT MGMT INPAT INIT DAY: CPT

## 2023-01-01 PROCEDURE — C9113 INJ PANTOPRAZOLE SODIUM, VIA: HCPCS

## 2023-01-01 PROCEDURE — 83690 ASSAY OF LIPASE: CPT

## 2023-01-01 PROCEDURE — 86022 PLATELET ANTIBODIES: CPT | Performed by: INTERNAL MEDICINE

## 2023-01-01 PROCEDURE — 83605 ASSAY OF LACTIC ACID: CPT | Performed by: NURSE PRACTITIONER

## 2023-01-01 PROCEDURE — 99900031 HC PATIENT EDUCATION (STAT)

## 2023-01-01 PROCEDURE — 99231 PR SUBSEQUENT HOSPITAL CARE,LEVL I: ICD-10-PCS | Mod: GC,,, | Performed by: INTERNAL MEDICINE

## 2023-01-01 PROCEDURE — 94640 AIRWAY INHALATION TREATMENT: CPT | Mod: XB

## 2023-01-01 PROCEDURE — 84443 ASSAY THYROID STIM HORMONE: CPT | Performed by: EMERGENCY MEDICINE

## 2023-01-01 PROCEDURE — 97161 PT EVAL LOW COMPLEX 20 MIN: CPT

## 2023-01-01 PROCEDURE — 82542 COL CHROMOTOGRAPHY QUAL/QUAN: CPT | Performed by: INTERNAL MEDICINE

## 2023-01-01 PROCEDURE — 99223 1ST HOSP IP/OBS HIGH 75: CPT | Mod: 25,,, | Performed by: NURSE PRACTITIONER

## 2023-01-01 PROCEDURE — 87077 CULTURE AEROBIC IDENTIFY: CPT | Mod: 59 | Performed by: EMERGENCY MEDICINE

## 2023-01-01 PROCEDURE — 86140 C-REACTIVE PROTEIN: CPT | Performed by: EMERGENCY MEDICINE

## 2023-01-01 PROCEDURE — 99153 MOD SED SAME PHYS/QHP EA: CPT

## 2023-01-01 PROCEDURE — 88305 TISSUE EXAM BY PATHOLOGIST: CPT | Mod: 26,,, | Performed by: STUDENT IN AN ORGANIZED HEALTH CARE EDUCATION/TRAINING PROGRAM

## 2023-01-01 PROCEDURE — 99291 PR CRITICAL CARE, E/M 30-74 MINUTES: ICD-10-PCS | Mod: CS,,, | Performed by: EMERGENCY MEDICINE

## 2023-01-01 PROCEDURE — 99232 SBSQ HOSP IP/OBS MODERATE 35: CPT | Mod: GC,,, | Performed by: STUDENT IN AN ORGANIZED HEALTH CARE EDUCATION/TRAINING PROGRAM

## 2023-01-01 PROCEDURE — 99284 EMERGENCY DEPT VISIT MOD MDM: CPT | Mod: ,,, | Performed by: EMERGENCY MEDICINE

## 2023-01-01 PROCEDURE — 67145 PROPH RTA DTCHMNT PC: CPT | Mod: PBBFAC,RT | Performed by: OPHTHALMOLOGY

## 2023-01-01 PROCEDURE — 80048 BASIC METABOLIC PNL TOTAL CA: CPT | Mod: XB | Performed by: HOSPITALIST

## 2023-01-01 PROCEDURE — 82010 KETONE BODYS QUAN: CPT | Performed by: EMERGENCY MEDICINE

## 2023-01-01 PROCEDURE — 99285 PR EMERGENCY DEPT VISIT,LEVEL V: ICD-10-PCS | Mod: CS,,, | Performed by: EMERGENCY MEDICINE

## 2023-01-01 PROCEDURE — 63600175 PHARM REV CODE 636 W HCPCS: Performed by: SURGERY

## 2023-01-01 PROCEDURE — 36000707: Performed by: SURGERY

## 2023-01-01 PROCEDURE — 85610 PROTHROMBIN TIME: CPT | Performed by: HOSPITALIST

## 2023-01-01 PROCEDURE — S5010 5% DEXTROSE AND 0.45% SALINE: HCPCS

## 2023-01-01 PROCEDURE — 86706 HEP B SURFACE ANTIBODY: CPT | Mod: 91 | Performed by: STUDENT IN AN ORGANIZED HEALTH CARE EDUCATION/TRAINING PROGRAM

## 2023-01-01 PROCEDURE — 99223 PR INITIAL HOSPITAL CARE,LEVL III: ICD-10-PCS | Mod: ,,, | Performed by: SURGERY

## 2023-01-01 PROCEDURE — 84145 PROCALCITONIN (PCT): CPT | Performed by: STUDENT IN AN ORGANIZED HEALTH CARE EDUCATION/TRAINING PROGRAM

## 2023-01-01 PROCEDURE — 85730 THROMBOPLASTIN TIME PARTIAL: CPT | Performed by: STUDENT IN AN ORGANIZED HEALTH CARE EDUCATION/TRAINING PROGRAM

## 2023-01-01 PROCEDURE — 82010 KETONE BODYS QUAN: CPT

## 2023-01-01 PROCEDURE — 99232 SBSQ HOSP IP/OBS MODERATE 35: CPT | Mod: ,,, | Performed by: NURSE PRACTITIONER

## 2023-01-01 PROCEDURE — 84484 ASSAY OF TROPONIN QUANT: CPT

## 2023-01-01 PROCEDURE — 99223 PR INITIAL HOSPITAL CARE,LEVL III: ICD-10-PCS | Mod: GC,,, | Performed by: INTERNAL MEDICINE

## 2023-01-01 PROCEDURE — 99283 EMERGENCY DEPT VISIT LOW MDM: CPT

## 2023-01-01 PROCEDURE — 96374 THER/PROPH/DIAG INJ IV PUSH: CPT | Mod: 59

## 2023-01-01 PROCEDURE — 89051 BODY FLUID CELL COUNT: CPT | Performed by: INTERNAL MEDICINE

## 2023-01-01 PROCEDURE — 25000242 PHARM REV CODE 250 ALT 637 W/ HCPCS: Performed by: STUDENT IN AN ORGANIZED HEALTH CARE EDUCATION/TRAINING PROGRAM

## 2023-01-01 PROCEDURE — 86704 HEP B CORE ANTIBODY TOTAL: CPT | Performed by: NURSE PRACTITIONER

## 2023-01-01 PROCEDURE — 99292 PR CRITICAL CARE, ADDL 30 MIN: ICD-10-PCS | Mod: 25,,, | Performed by: EMERGENCY MEDICINE

## 2023-01-01 PROCEDURE — 36556 PR INSERT NON-TUNNEL CV CATH 5+ YRS OLD: ICD-10-PCS | Mod: ,,, | Performed by: NURSE PRACTITIONER

## 2023-01-01 PROCEDURE — 99291 CRITICAL CARE FIRST HOUR: CPT | Mod: 25,,, | Performed by: EMERGENCY MEDICINE

## 2023-01-01 PROCEDURE — 92014 PR EYE EXAM, EST PATIENT,COMPREHESV: ICD-10-PCS | Mod: S$PBB,,, | Performed by: OPHTHALMOLOGY

## 2023-01-01 PROCEDURE — 83735 ASSAY OF MAGNESIUM: CPT | Performed by: HOSPITALIST

## 2023-01-01 PROCEDURE — 99292 CRITICAL CARE ADDL 30 MIN: CPT | Mod: 25,,, | Performed by: EMERGENCY MEDICINE

## 2023-01-01 PROCEDURE — A4550 SURGICAL TRAYS: HCPCS

## 2023-01-01 PROCEDURE — 99284 EMERGENCY DEPT VISIT MOD MDM: CPT

## 2023-01-01 PROCEDURE — 99900 PR LEFT WITHOUTBEING SEEN-EMERGENCY: CPT | Mod: ,,, | Performed by: EMERGENCY MEDICINE

## 2023-01-01 PROCEDURE — 83605 ASSAY OF LACTIC ACID: CPT | Performed by: EMERGENCY MEDICINE

## 2023-01-01 PROCEDURE — 86900 BLOOD TYPING SEROLOGIC ABO: CPT | Performed by: EMERGENCY MEDICINE

## 2023-01-01 PROCEDURE — 99223 PR INITIAL HOSPITAL CARE,LEVL III: ICD-10-PCS | Mod: GC,,, | Performed by: STUDENT IN AN ORGANIZED HEALTH CARE EDUCATION/TRAINING PROGRAM

## 2023-01-01 PROCEDURE — 88112 CYTOPATH CELL ENHANCE TECH: CPT | Performed by: STUDENT IN AN ORGANIZED HEALTH CARE EDUCATION/TRAINING PROGRAM

## 2023-01-01 PROCEDURE — 99233 SBSQ HOSP IP/OBS HIGH 50: CPT | Mod: ,,, | Performed by: PSYCHIATRY & NEUROLOGY

## 2023-01-01 PROCEDURE — 36415 COLL VENOUS BLD VENIPUNCTURE: CPT | Performed by: SURGERY

## 2023-01-01 PROCEDURE — 99233 PR SUBSEQUENT HOSPITAL CARE,LEVL III: ICD-10-PCS | Mod: ,,, | Performed by: PSYCHIATRY & NEUROLOGY

## 2023-01-01 PROCEDURE — 92526 ORAL FUNCTION THERAPY: CPT

## 2023-01-01 PROCEDURE — 49082 ABD PARACENTESIS: CPT | Performed by: EMERGENCY MEDICINE

## 2023-01-01 PROCEDURE — 99222 1ST HOSP IP/OBS MODERATE 55: CPT | Mod: ,,, | Performed by: HOSPITALIST

## 2023-01-01 PROCEDURE — 99291 CRITICAL CARE FIRST HOUR: CPT | Mod: ,,,

## 2023-01-01 PROCEDURE — 25500020 PHARM REV CODE 255: Performed by: SURGERY

## 2023-01-01 PROCEDURE — 85025 COMPLETE CBC W/AUTO DIFF WBC: CPT | Mod: 91 | Performed by: STUDENT IN AN ORGANIZED HEALTH CARE EDUCATION/TRAINING PROGRAM

## 2023-01-01 PROCEDURE — 92134 CPTRZ OPH DX IMG PST SGM RTA: CPT | Mod: PBBFAC | Performed by: OPHTHALMOLOGY

## 2023-01-01 PROCEDURE — 99213 OFFICE O/P EST LOW 20 MIN: CPT | Mod: PBBFAC | Performed by: OPHTHALMOLOGY

## 2023-01-01 PROCEDURE — 36620 INSERTION CATHETER ARTERY: CPT

## 2023-01-01 PROCEDURE — 25500020 PHARM REV CODE 255: Performed by: STUDENT IN AN ORGANIZED HEALTH CARE EDUCATION/TRAINING PROGRAM

## 2023-01-01 PROCEDURE — 67145 PROPH RTA DTCHMNT PC: CPT | Mod: S$PBB,RT,, | Performed by: OPHTHALMOLOGY

## 2023-01-01 PROCEDURE — 99223 1ST HOSP IP/OBS HIGH 75: CPT | Mod: ,,, | Performed by: PHYSICIAN ASSISTANT

## 2023-01-01 PROCEDURE — 85025 COMPLETE CBC W/AUTO DIFF WBC: CPT | Mod: 91

## 2023-01-01 PROCEDURE — 99233 SBSQ HOSP IP/OBS HIGH 50: CPT | Mod: 95,,, | Performed by: HOSPITALIST

## 2023-01-01 PROCEDURE — 83735 ASSAY OF MAGNESIUM: CPT | Mod: 91 | Performed by: PHYSICIAN ASSISTANT

## 2023-01-01 PROCEDURE — 99291 PR CRITICAL CARE, E/M 30-74 MINUTES: ICD-10-PCS | Mod: FS,25,, | Performed by: INTERNAL MEDICINE

## 2023-01-01 PROCEDURE — 31500 INSERT EMERGENCY AIRWAY: CPT | Mod: GC,,, | Performed by: INTERNAL MEDICINE

## 2023-01-01 PROCEDURE — 25000242 PHARM REV CODE 250 ALT 637 W/ HCPCS: Performed by: NURSE PRACTITIONER

## 2023-01-01 PROCEDURE — 96366 THER/PROPH/DIAG IV INF ADDON: CPT

## 2023-01-01 PROCEDURE — 80202 ASSAY OF VANCOMYCIN: CPT | Performed by: EMERGENCY MEDICINE

## 2023-01-01 PROCEDURE — 85025 COMPLETE CBC W/AUTO DIFF WBC: CPT | Mod: 91 | Performed by: PHYSICIAN ASSISTANT

## 2023-01-01 PROCEDURE — 90935 HEMODIALYSIS ONE EVALUATION: CPT | Mod: ,,, | Performed by: INTERNAL MEDICINE

## 2023-01-01 PROCEDURE — 99233 PR SUBSEQUENT HOSPITAL CARE,LEVL III: ICD-10-PCS | Mod: 95,,, | Performed by: STUDENT IN AN ORGANIZED HEALTH CARE EDUCATION/TRAINING PROGRAM

## 2023-01-01 PROCEDURE — 99238 PR HOSPITAL DISCHARGE DAY,<30 MIN: ICD-10-PCS | Mod: ,,, | Performed by: STUDENT IN AN ORGANIZED HEALTH CARE EDUCATION/TRAINING PROGRAM

## 2023-01-01 PROCEDURE — 36901 INTRO CATH DIALYSIS CIRCUIT: CPT | Mod: ,,, | Performed by: SURGERY

## 2023-01-01 PROCEDURE — 82042 OTHER SOURCE ALBUMIN QUAN EA: CPT

## 2023-01-01 PROCEDURE — 99223 PR INITIAL HOSPITAL CARE,LEVL III: ICD-10-PCS | Mod: ,,,

## 2023-01-01 PROCEDURE — 12000002 HC ACUTE/MED SURGE SEMI-PRIVATE ROOM

## 2023-01-01 PROCEDURE — 80048 BASIC METABOLIC PNL TOTAL CA: CPT | Performed by: PHYSICIAN ASSISTANT

## 2023-01-01 PROCEDURE — 80061 LIPID PANEL: CPT

## 2023-01-01 PROCEDURE — 84520 ASSAY OF UREA NITROGEN: CPT | Performed by: STUDENT IN AN ORGANIZED HEALTH CARE EDUCATION/TRAINING PROGRAM

## 2023-01-01 PROCEDURE — 67145 PR PROPHYLAXIS, RETINAL DETACH, PHOTOCOAGULATION: ICD-10-PCS | Mod: S$PBB,RT,, | Performed by: OPHTHALMOLOGY

## 2023-01-01 PROCEDURE — 83880 ASSAY OF NATRIURETIC PEPTIDE: CPT

## 2023-01-01 PROCEDURE — 99152 MOD SED SAME PHYS/QHP 5/>YRS: CPT

## 2023-01-01 PROCEDURE — 37799 UNLISTED PX VASCULAR SURGERY: CPT

## 2023-01-01 PROCEDURE — 99291 CRITICAL CARE FIRST HOUR: CPT | Mod: FS,25,, | Performed by: INTERNAL MEDICINE

## 2023-01-01 PROCEDURE — D9220A PRA ANESTHESIA: Mod: ANES,,, | Performed by: ANESTHESIOLOGY

## 2023-01-01 PROCEDURE — 99233 PR SUBSEQUENT HOSPITAL CARE,LEVL III: ICD-10-PCS | Mod: ,,, | Performed by: HOSPITALIST

## 2023-01-01 PROCEDURE — 84484 ASSAY OF TROPONIN QUANT: CPT | Mod: 91

## 2023-01-01 PROCEDURE — 92610 EVALUATE SWALLOWING FUNCTION: CPT

## 2023-01-01 PROCEDURE — C1751 CATH, INF, PER/CENT/MIDLINE: HCPCS

## 2023-01-01 PROCEDURE — 90935 HEMODIALYSIS ONE EVALUATION: CPT

## 2023-01-01 PROCEDURE — 92201 OPSCPY EXTND RTA DRAW UNI/BI: CPT | Mod: 59,S$PBB,, | Performed by: OPHTHALMOLOGY

## 2023-01-01 PROCEDURE — 83036 HEMOGLOBIN GLYCOSYLATED A1C: CPT | Performed by: NURSE PRACTITIONER

## 2023-01-01 PROCEDURE — 84681 ASSAY OF C-PEPTIDE: CPT | Performed by: NURSE PRACTITIONER

## 2023-01-01 PROCEDURE — 83525 ASSAY OF INSULIN: CPT | Performed by: INTERNAL MEDICINE

## 2023-01-01 PROCEDURE — 84132 ASSAY OF SERUM POTASSIUM: CPT | Performed by: STUDENT IN AN ORGANIZED HEALTH CARE EDUCATION/TRAINING PROGRAM

## 2023-01-01 PROCEDURE — 83690 ASSAY OF LIPASE: CPT | Mod: 91 | Performed by: EMERGENCY MEDICINE

## 2023-01-01 PROCEDURE — P9021 RED BLOOD CELLS UNIT: HCPCS | Performed by: EMERGENCY MEDICINE

## 2023-01-01 PROCEDURE — 80053 COMPREHEN METABOLIC PANEL: CPT | Performed by: INTERNAL MEDICINE

## 2023-01-01 PROCEDURE — 96376 TX/PRO/DX INJ SAME DRUG ADON: CPT | Mod: 59

## 2023-01-01 PROCEDURE — 99213 OFFICE O/P EST LOW 20 MIN: CPT | Mod: PBBFAC | Performed by: NURSE PRACTITIONER

## 2023-01-01 PROCEDURE — 80053 COMPREHEN METABOLIC PANEL: CPT | Performed by: PHYSICIAN ASSISTANT

## 2023-01-01 PROCEDURE — 87340 HEPATITIS B SURFACE AG IA: CPT | Performed by: NURSE PRACTITIONER

## 2023-01-01 PROCEDURE — 99223 1ST HOSP IP/OBS HIGH 75: CPT | Mod: ,,, | Performed by: SURGERY

## 2023-01-01 PROCEDURE — D9220A PRA ANESTHESIA: ICD-10-PCS | Mod: ANES,,, | Performed by: ANESTHESIOLOGY

## 2023-01-01 PROCEDURE — 84443 ASSAY THYROID STIM HORMONE: CPT | Performed by: INTERNAL MEDICINE

## 2023-01-01 PROCEDURE — 84145 PROCALCITONIN (PCT): CPT

## 2023-01-01 PROCEDURE — 99215 PR OFFICE/OUTPT VISIT, EST, LEVL V, 40-54 MIN: ICD-10-PCS | Mod: ,,, | Performed by: NURSE PRACTITIONER

## 2023-01-01 PROCEDURE — 99204 OFFICE O/P NEW MOD 45 MIN: CPT | Mod: 25,S$PBB,, | Performed by: OPHTHALMOLOGY

## 2023-01-01 PROCEDURE — 99233 SBSQ HOSP IP/OBS HIGH 50: CPT | Mod: GC,,, | Performed by: STUDENT IN AN ORGANIZED HEALTH CARE EDUCATION/TRAINING PROGRAM

## 2023-01-01 PROCEDURE — 0241U SARS-COV2 (COVID) WITH FLU/RSV BY PCR: CPT | Performed by: EMERGENCY MEDICINE

## 2023-01-01 PROCEDURE — 86704 HEP B CORE ANTIBODY TOTAL: CPT | Performed by: HOSPITALIST

## 2023-01-01 PROCEDURE — 99285 EMERGENCY DEPT VISIT HI MDM: CPT | Mod: CS,,, | Performed by: EMERGENCY MEDICINE

## 2023-01-01 PROCEDURE — 99239 HOSP IP/OBS DSCHRG MGMT >30: CPT | Mod: GC,,, | Performed by: STUDENT IN AN ORGANIZED HEALTH CARE EDUCATION/TRAINING PROGRAM

## 2023-01-01 PROCEDURE — 88112 PR  CYTOPATH, CELL ENHANCE TECH: ICD-10-PCS | Mod: 26,,, | Performed by: STUDENT IN AN ORGANIZED HEALTH CARE EDUCATION/TRAINING PROGRAM

## 2023-01-01 PROCEDURE — 31720 CLEARANCE OF AIRWAYS: CPT

## 2023-01-01 PROCEDURE — 31500 INSERT EMERGENCY AIRWAY: CPT

## 2023-01-01 PROCEDURE — 99223 1ST HOSP IP/OBS HIGH 75: CPT | Mod: GC,,, | Performed by: INTERNAL MEDICINE

## 2023-01-01 PROCEDURE — 97110 THERAPEUTIC EXERCISES: CPT

## 2023-01-01 PROCEDURE — 99999 PR PBB SHADOW E&M-EST. PATIENT-LVL V: ICD-10-PCS | Mod: PBBFAC,,, | Performed by: NURSE PRACTITIONER

## 2023-01-01 PROCEDURE — C1894 INTRO/SHEATH, NON-LASER: HCPCS | Performed by: SURGERY

## 2023-01-01 PROCEDURE — 88305 TISSUE EXAM BY PATHOLOGIST: ICD-10-PCS | Mod: 26,,, | Performed by: STUDENT IN AN ORGANIZED HEALTH CARE EDUCATION/TRAINING PROGRAM

## 2023-01-01 PROCEDURE — 99999 PR PBB SHADOW E&M-EST. PATIENT-LVL III: ICD-10-PCS | Mod: PBBFAC,,, | Performed by: NURSE PRACTITIONER

## 2023-01-01 PROCEDURE — 99231 SBSQ HOSP IP/OBS SF/LOW 25: CPT | Mod: GC,,, | Performed by: INTERNAL MEDICINE

## 2023-01-01 PROCEDURE — 84145 PROCALCITONIN (PCT): CPT | Performed by: EMERGENCY MEDICINE

## 2023-01-01 PROCEDURE — 99291 PR CRITICAL CARE, E/M 30-74 MINUTES: ICD-10-PCS | Mod: 25,GC,, | Performed by: EMERGENCY MEDICINE

## 2023-01-01 PROCEDURE — 82010 KETONE BODYS QUAN: CPT | Performed by: INTERNAL MEDICINE

## 2023-01-01 PROCEDURE — 96361 HYDRATE IV INFUSION ADD-ON: CPT

## 2023-01-01 PROCEDURE — 99222 PR INITIAL HOSPITAL CARE,LEVL II: ICD-10-PCS | Mod: GC,,, | Performed by: INTERNAL MEDICINE

## 2023-01-01 PROCEDURE — 83615 LACTATE (LD) (LDH) ENZYME: CPT

## 2023-01-01 PROCEDURE — 83735 ASSAY OF MAGNESIUM: CPT | Performed by: EMERGENCY MEDICINE

## 2023-01-01 PROCEDURE — 99233 SBSQ HOSP IP/OBS HIGH 50: CPT | Mod: GC,,, | Performed by: EMERGENCY MEDICINE

## 2023-01-01 PROCEDURE — 36415 COLL VENOUS BLD VENIPUNCTURE: CPT | Performed by: INTERNAL MEDICINE

## 2023-01-01 PROCEDURE — 36556 PR INSERT NON-TUNNEL CV CATH 5+ YRS OLD: ICD-10-PCS | Mod: 59,GC,, | Performed by: INTERNAL MEDICINE

## 2023-01-01 PROCEDURE — 99232 SBSQ HOSP IP/OBS MODERATE 35: CPT | Mod: GC,,, | Performed by: HOSPITALIST

## 2023-01-01 PROCEDURE — 83540 ASSAY OF IRON: CPT | Performed by: NURSE PRACTITIONER

## 2023-01-01 PROCEDURE — 99215 OFFICE O/P EST HI 40 MIN: CPT | Mod: PBBFAC | Performed by: NURSE PRACTITIONER

## 2023-01-01 PROCEDURE — 84681 ASSAY OF C-PEPTIDE: CPT | Performed by: INTERNAL MEDICINE

## 2023-01-01 PROCEDURE — 99233 PR SUBSEQUENT HOSPITAL CARE,LEVL III: ICD-10-PCS | Mod: 95,,, | Performed by: HOSPITALIST

## 2023-01-01 PROCEDURE — 27000923 HC TRIALYSIS CATHETER, ANY SIZE

## 2023-01-01 PROCEDURE — 88305 TISSUE EXAM BY PATHOLOGIST: CPT | Performed by: STUDENT IN AN ORGANIZED HEALTH CARE EDUCATION/TRAINING PROGRAM

## 2023-01-01 PROCEDURE — 86920 COMPATIBILITY TEST SPIN: CPT

## 2023-01-01 PROCEDURE — 36410 VNPNXR 3YR/> PHY/QHP DX/THER: CPT

## 2023-01-01 PROCEDURE — 82533 TOTAL CORTISOL: CPT | Performed by: INTERNAL MEDICINE

## 2023-01-01 PROCEDURE — 96372 THER/PROPH/DIAG INJ SC/IM: CPT | Mod: 59 | Performed by: NURSE PRACTITIONER

## 2023-01-01 PROCEDURE — 82947 ASSAY GLUCOSE BLOOD QUANT: CPT | Mod: 91 | Performed by: INTERNAL MEDICINE

## 2023-01-01 PROCEDURE — 95720 EEG PHY/QHP EA INCR W/VEEG: CPT | Mod: ,,, | Performed by: PSYCHIATRY & NEUROLOGY

## 2023-01-01 PROCEDURE — 99223 PR INITIAL HOSPITAL CARE,LEVL III: ICD-10-PCS | Mod: GC,,, | Performed by: HOSPITALIST

## 2023-01-01 PROCEDURE — U0005 INFEC AGEN DETEC AMPLI PROBE: HCPCS | Performed by: HOSPITALIST

## 2023-01-01 PROCEDURE — 99284 EMERGENCY DEPT VISIT MOD MDM: CPT | Mod: 25

## 2023-01-01 PROCEDURE — 63600175 PHARM REV CODE 636 W HCPCS: Mod: JG

## 2023-01-01 PROCEDURE — 80048 BASIC METABOLIC PNL TOTAL CA: CPT | Mod: 91,XB

## 2023-01-01 PROCEDURE — 99232 PR SUBSEQUENT HOSPITAL CARE,LEVL II: ICD-10-PCS | Mod: GC,,, | Performed by: STUDENT IN AN ORGANIZED HEALTH CARE EDUCATION/TRAINING PROGRAM

## 2023-01-01 PROCEDURE — 99239 PR HOSPITAL DISCHARGE DAY,>30 MIN: ICD-10-PCS | Mod: GC,,, | Performed by: STUDENT IN AN ORGANIZED HEALTH CARE EDUCATION/TRAINING PROGRAM

## 2023-01-01 PROCEDURE — D9220A PRA ANESTHESIA: ICD-10-PCS | Mod: CRNA,,, | Performed by: NURSE ANESTHETIST, CERTIFIED REGISTERED

## 2023-01-01 PROCEDURE — 25000003 PHARM REV CODE 250: Performed by: NURSE ANESTHETIST, CERTIFIED REGISTERED

## 2023-01-01 PROCEDURE — 87070 CULTURE OTHR SPECIMN AEROBIC: CPT

## 2023-01-01 PROCEDURE — 99215 OFFICE O/P EST HI 40 MIN: CPT | Mod: ,,, | Performed by: NURSE PRACTITIONER

## 2023-01-01 PROCEDURE — 99283 PR EMERGENCY DEPT VISIT,LEVEL III: ICD-10-PCS | Mod: FS,,, | Performed by: EMERGENCY MEDICINE

## 2023-01-01 PROCEDURE — 99152 MOD SED SAME PHYS/QHP 5/>YRS: CPT | Mod: ,,, | Performed by: SURGERY

## 2023-01-01 PROCEDURE — 80048 BASIC METABOLIC PNL TOTAL CA: CPT | Mod: XB | Performed by: STUDENT IN AN ORGANIZED HEALTH CARE EDUCATION/TRAINING PROGRAM

## 2023-01-01 PROCEDURE — 99291 CRITICAL CARE FIRST HOUR: CPT | Mod: 25,GC,, | Performed by: EMERGENCY MEDICINE

## 2023-01-01 PROCEDURE — 83605 ASSAY OF LACTIC ACID: CPT | Mod: 91

## 2023-01-01 PROCEDURE — 99238 HOSP IP/OBS DSCHRG MGMT 30/<: CPT | Mod: ,,, | Performed by: STUDENT IN AN ORGANIZED HEALTH CARE EDUCATION/TRAINING PROGRAM

## 2023-01-01 PROCEDURE — 82077 ASSAY SPEC XCP UR&BREATH IA: CPT | Performed by: EMERGENCY MEDICINE

## 2023-01-01 PROCEDURE — 86901 BLOOD TYPING SEROLOGIC RH(D): CPT | Performed by: EMERGENCY MEDICINE

## 2023-01-01 PROCEDURE — 99204 PR OFFICE/OUTPT VISIT, NEW, LEVL IV, 45-59 MIN: ICD-10-PCS | Mod: 25,S$PBB,, | Performed by: OPHTHALMOLOGY

## 2023-01-01 PROCEDURE — 37000009 HC ANESTHESIA EA ADD 15 MINS: Performed by: SURGERY

## 2023-01-01 PROCEDURE — 85610 PROTHROMBIN TIME: CPT

## 2023-01-01 PROCEDURE — 85610 PROTHROMBIN TIME: CPT | Performed by: STUDENT IN AN ORGANIZED HEALTH CARE EDUCATION/TRAINING PROGRAM

## 2023-01-01 PROCEDURE — 82550 ASSAY OF CK (CPK): CPT | Performed by: EMERGENCY MEDICINE

## 2023-01-01 PROCEDURE — 80048 BASIC METABOLIC PNL TOTAL CA: CPT | Performed by: INTERNAL MEDICINE

## 2023-01-01 PROCEDURE — 63600175 PHARM REV CODE 636 W HCPCS: Performed by: PHYSICIAN ASSISTANT

## 2023-01-01 PROCEDURE — 99239 HOSP IP/OBS DSCHRG MGMT >30: CPT | Mod: ,,, | Performed by: STUDENT IN AN ORGANIZED HEALTH CARE EDUCATION/TRAINING PROGRAM

## 2023-01-01 PROCEDURE — 99233 PR SUBSEQUENT HOSPITAL CARE,LEVL III: ICD-10-PCS | Mod: GC,,, | Performed by: STUDENT IN AN ORGANIZED HEALTH CARE EDUCATION/TRAINING PROGRAM

## 2023-01-01 PROCEDURE — 80048 BASIC METABOLIC PNL TOTAL CA: CPT | Mod: XB | Performed by: SURGERY

## 2023-01-01 PROCEDURE — 84100 ASSAY OF PHOSPHORUS: CPT | Performed by: PHYSICIAN ASSISTANT

## 2023-01-01 PROCEDURE — 85007 BL SMEAR W/DIFF WBC COUNT: CPT | Mod: NCS | Performed by: PHYSICIAN ASSISTANT

## 2023-01-01 PROCEDURE — 99292 PR CRITICAL CARE, ADDL 30 MIN: ICD-10-PCS | Mod: FS,25,, | Performed by: INTERNAL MEDICINE

## 2023-01-01 PROCEDURE — 36901 INTRO CATH DIALYSIS CIRCUIT: CPT | Mod: GC,,, | Performed by: SURGERY

## 2023-01-01 PROCEDURE — 86706 HEP B SURFACE ANTIBODY: CPT | Performed by: NURSE PRACTITIONER

## 2023-01-01 PROCEDURE — 99222 PR INITIAL HOSPITAL CARE,LEVL II: ICD-10-PCS | Mod: ,,, | Performed by: HOSPITALIST

## 2023-01-01 PROCEDURE — 83615 LACTATE (LD) (LDH) ENZYME: CPT | Performed by: INTERNAL MEDICINE

## 2023-01-01 PROCEDURE — 80053 COMPREHEN METABOLIC PANEL: CPT | Mod: 91 | Performed by: EMERGENCY MEDICINE

## 2023-01-01 PROCEDURE — 82947 ASSAY GLUCOSE BLOOD QUANT: CPT | Performed by: STUDENT IN AN ORGANIZED HEALTH CARE EDUCATION/TRAINING PROGRAM

## 2023-01-01 PROCEDURE — 85027 COMPLETE CBC AUTOMATED: CPT | Mod: 91 | Performed by: PHYSICIAN ASSISTANT

## 2023-01-01 PROCEDURE — 99222 1ST HOSP IP/OBS MODERATE 55: CPT | Mod: 25,,, | Performed by: SURGERY

## 2023-01-01 PROCEDURE — 80048 BASIC METABOLIC PNL TOTAL CA: CPT | Mod: XB | Performed by: EMERGENCY MEDICINE

## 2023-01-01 PROCEDURE — S5010 5% DEXTROSE AND 0.45% SALINE: HCPCS | Performed by: STUDENT IN AN ORGANIZED HEALTH CARE EDUCATION/TRAINING PROGRAM

## 2023-01-01 PROCEDURE — 99233 SBSQ HOSP IP/OBS HIGH 50: CPT | Mod: ,,, | Performed by: HOSPITALIST

## 2023-01-01 PROCEDURE — 99223 1ST HOSP IP/OBS HIGH 75: CPT | Mod: GC,,, | Performed by: HOSPITALIST

## 2023-01-01 PROCEDURE — 85025 COMPLETE CBC W/AUTO DIFF WBC: CPT | Mod: 91 | Performed by: EMERGENCY MEDICINE

## 2023-01-01 PROCEDURE — 99282 EMERGENCY DEPT VISIT SF MDM: CPT | Mod: 25,27

## 2023-01-01 PROCEDURE — 92014 COMPRE OPH EXAM EST PT 1/>: CPT | Mod: S$PBB,,, | Performed by: OPHTHALMOLOGY

## 2023-01-01 PROCEDURE — 87040 BLOOD CULTURE FOR BACTERIA: CPT

## 2023-01-01 PROCEDURE — 94760 N-INVAS EAR/PLS OXIMETRY 1: CPT

## 2023-01-01 PROCEDURE — 89051 BODY FLUID CELL COUNT: CPT

## 2023-01-01 PROCEDURE — 99223 PR INITIAL HOSPITAL CARE,LEVL III: ICD-10-PCS | Mod: ,,, | Performed by: INTERNAL MEDICINE

## 2023-01-01 PROCEDURE — 0241U SARS-COV2 (COVID) WITH FLU/RSV BY PCR: CPT | Performed by: NURSE PRACTITIONER

## 2023-01-01 PROCEDURE — 82140 ASSAY OF AMMONIA: CPT

## 2023-01-01 PROCEDURE — 85730 THROMBOPLASTIN TIME PARTIAL: CPT | Performed by: EMERGENCY MEDICINE

## 2023-01-01 PROCEDURE — 84466 ASSAY OF TRANSFERRIN: CPT | Performed by: NURSE PRACTITIONER

## 2023-01-01 PROCEDURE — 99239 PR HOSPITAL DISCHARGE DAY,>30 MIN: ICD-10-PCS | Mod: ,,,

## 2023-01-01 PROCEDURE — 85730 THROMBOPLASTIN TIME PARTIAL: CPT | Mod: 91 | Performed by: INTERNAL MEDICINE

## 2023-01-01 PROCEDURE — 86920 COMPATIBILITY TEST SPIN: CPT | Performed by: EMERGENCY MEDICINE

## 2023-01-01 PROCEDURE — 36556 INSERT NON-TUNNEL CV CATH: CPT | Mod: ,,, | Performed by: NURSE PRACTITIONER

## 2023-01-01 PROCEDURE — 36901 INTRO CATH DIALYSIS CIRCUIT: CPT | Performed by: SURGERY

## 2023-01-01 PROCEDURE — 87154 CUL TYP ID BLD PTHGN 6+ TRGT: CPT | Performed by: EMERGENCY MEDICINE

## 2023-01-01 PROCEDURE — 87040 BLOOD CULTURE FOR BACTERIA: CPT | Performed by: EMERGENCY MEDICINE

## 2023-01-01 PROCEDURE — 82947 ASSAY GLUCOSE BLOOD QUANT: CPT | Mod: 91 | Performed by: EMERGENCY MEDICINE

## 2023-01-01 PROCEDURE — 25800024 PHARM REV CODE 258: Performed by: INTERNAL MEDICINE

## 2023-01-01 PROCEDURE — 99239 PR HOSPITAL DISCHARGE DAY,>30 MIN: ICD-10-PCS | Mod: ,,, | Performed by: STUDENT IN AN ORGANIZED HEALTH CARE EDUCATION/TRAINING PROGRAM

## 2023-01-01 PROCEDURE — 99291 PR CRITICAL CARE, E/M 30-74 MINUTES: ICD-10-PCS | Mod: 25,,, | Performed by: EMERGENCY MEDICINE

## 2023-01-01 PROCEDURE — 80053 COMPREHEN METABOLIC PANEL: CPT | Mod: 91

## 2023-01-01 PROCEDURE — 99999 PR PBB SHADOW E&M-EST. PATIENT-LVL III: CPT | Mod: PBBFAC,,, | Performed by: NURSE PRACTITIONER

## 2023-01-01 PROCEDURE — 36556 INSERT NON-TUNNEL CV CATH: CPT | Mod: 59,GC,, | Performed by: INTERNAL MEDICINE

## 2023-01-01 PROCEDURE — D9220A PRA ANESTHESIA: Mod: CRNA,,, | Performed by: NURSE ANESTHETIST, CERTIFIED REGISTERED

## 2023-01-01 PROCEDURE — 25500020 PHARM REV CODE 255: Performed by: INTERNAL MEDICINE

## 2023-01-01 PROCEDURE — 99223 PR INITIAL HOSPITAL CARE,LEVL III: ICD-10-PCS | Mod: ,,, | Performed by: CLINICAL NURSE SPECIALIST

## 2023-01-01 RX ORDER — INSULIN ASPART 100 [IU]/ML
1-10 INJECTION, SOLUTION INTRAVENOUS; SUBCUTANEOUS
Status: DISCONTINUED | OUTPATIENT
Start: 2023-01-01 | End: 2023-01-01

## 2023-01-01 RX ORDER — ASPIRIN 81 MG/1
81 TABLET ORAL DAILY
Status: DISCONTINUED | OUTPATIENT
Start: 2023-01-01 | End: 2023-01-01 | Stop reason: HOSPADM

## 2023-01-01 RX ORDER — INSULIN ASPART 100 [IU]/ML
0-10 INJECTION, SOLUTION INTRAVENOUS; SUBCUTANEOUS EVERY 4 HOURS PRN
Status: DISCONTINUED | OUTPATIENT
Start: 2023-01-01 | End: 2023-01-01 | Stop reason: HOSPADM

## 2023-01-01 RX ORDER — ASCORBIC ACID 500 MG
500 TABLET ORAL 2 TIMES DAILY
Status: DISCONTINUED | OUTPATIENT
Start: 2023-01-01 | End: 2023-01-01

## 2023-01-01 RX ORDER — INSULIN LISPRO 100 [IU]/ML
10 INJECTION, SOLUTION INTRAVENOUS; SUBCUTANEOUS
Status: ON HOLD | COMMUNITY
Start: 2023-01-01 | End: 2023-01-01 | Stop reason: HOSPADM

## 2023-01-01 RX ORDER — POTASSIUM CHLORIDE 7.45 MG/ML
40 INJECTION INTRAVENOUS
Status: DISCONTINUED | OUTPATIENT
Start: 2023-01-01 | End: 2023-01-01

## 2023-01-01 RX ORDER — HYDRALAZINE HYDROCHLORIDE 25 MG/1
25 TABLET, FILM COATED ORAL EVERY 8 HOURS PRN
Status: DISCONTINUED | OUTPATIENT
Start: 2023-01-01 | End: 2023-01-01

## 2023-01-01 RX ORDER — IBUPROFEN 200 MG
16 TABLET ORAL
Status: DISCONTINUED | OUTPATIENT
Start: 2023-01-01 | End: 2023-01-01 | Stop reason: HOSPADM

## 2023-01-01 RX ORDER — DEXTROSE 40 %
30 GEL (GRAM) ORAL
Status: DISCONTINUED | OUTPATIENT
Start: 2023-01-01 | End: 2023-01-01 | Stop reason: HOSPADM

## 2023-01-01 RX ORDER — DOXYCYCLINE HYCLATE 100 MG
100 TABLET ORAL EVERY 12 HOURS
Status: DISCONTINUED | OUTPATIENT
Start: 2023-01-01 | End: 2023-01-01 | Stop reason: HOSPADM

## 2023-01-01 RX ORDER — VITAMIN A 3000 MCG
2 CAPSULE ORAL 3 TIMES DAILY
Qty: 104 ML | Refills: 12
Start: 2023-01-01 | End: 2023-11-20

## 2023-01-01 RX ORDER — INSULIN ASPART 100 [IU]/ML
5 INJECTION, SOLUTION INTRAVENOUS; SUBCUTANEOUS
Status: DISCONTINUED | OUTPATIENT
Start: 2023-01-01 | End: 2023-01-01 | Stop reason: HOSPADM

## 2023-01-01 RX ORDER — CLONIDINE HYDROCHLORIDE 0.1 MG/1
0.1 TABLET ORAL
Status: ON HOLD | COMMUNITY
End: 2023-01-01 | Stop reason: HOSPADM

## 2023-01-01 RX ORDER — HYDROCODONE BITARTRATE AND ACETAMINOPHEN 500; 5 MG/1; MG/1
TABLET ORAL
Status: DISCONTINUED | OUTPATIENT
Start: 2023-01-01 | End: 2023-01-01

## 2023-01-01 RX ORDER — ENALAPRILAT 1.25 MG/ML
0.62 INJECTION INTRAVENOUS
Status: COMPLETED | OUTPATIENT
Start: 2023-01-01 | End: 2023-01-01

## 2023-01-01 RX ORDER — INSULIN ASPART 100 [IU]/ML
6 INJECTION, SOLUTION INTRAVENOUS; SUBCUTANEOUS
Status: DISCONTINUED | OUTPATIENT
Start: 2023-01-01 | End: 2023-01-01

## 2023-01-01 RX ORDER — ISOSORBIDE MONONITRATE 30 MG/1
30 TABLET, EXTENDED RELEASE ORAL 2 TIMES DAILY
Status: DISCONTINUED | OUTPATIENT
Start: 2023-01-01 | End: 2023-01-01 | Stop reason: HOSPADM

## 2023-01-01 RX ORDER — OXYMETAZOLINE HCL 0.05 %
1 SPRAY, NON-AEROSOL (ML) NASAL
Status: DISCONTINUED | OUTPATIENT
Start: 2023-01-01 | End: 2023-01-01

## 2023-01-01 RX ORDER — ROCURONIUM BROMIDE 10 MG/ML
INJECTION, SOLUTION INTRAVENOUS
Status: COMPLETED
Start: 2023-01-01 | End: 2023-01-01

## 2023-01-01 RX ORDER — DEXMEDETOMIDINE HYDROCHLORIDE 100 UG/ML
INJECTION, SOLUTION INTRAVENOUS
Status: DISCONTINUED | OUTPATIENT
Start: 2023-01-01 | End: 2023-01-01

## 2023-01-01 RX ORDER — INSULIN ASPART 100 [IU]/ML
5 INJECTION, SOLUTION INTRAVENOUS; SUBCUTANEOUS
Status: DISCONTINUED | OUTPATIENT
Start: 2023-01-01 | End: 2023-01-01

## 2023-01-01 RX ORDER — OXYMETAZOLINE HCL 0.05 %
2 SPRAY, NON-AEROSOL (ML) NASAL 2 TIMES DAILY PRN
Status: DISCONTINUED | OUTPATIENT
Start: 2023-01-01 | End: 2023-01-01

## 2023-01-01 RX ORDER — SEVELAMER CARBONATE 800 MG/1
800 TABLET, FILM COATED ORAL
Status: DISCONTINUED | OUTPATIENT
Start: 2023-01-01 | End: 2023-01-01 | Stop reason: HOSPADM

## 2023-01-01 RX ORDER — IPRATROPIUM BROMIDE AND ALBUTEROL SULFATE 2.5; .5 MG/3ML; MG/3ML
3 SOLUTION RESPIRATORY (INHALATION)
Qty: 6480 ML | Refills: 0 | Status: ON HOLD
Start: 2023-01-01 | End: 2023-01-01 | Stop reason: HOSPADM

## 2023-01-01 RX ORDER — METRONIDAZOLE 500 MG/1
500 TABLET ORAL EVERY 8 HOURS
Status: DISCONTINUED | OUTPATIENT
Start: 2023-01-01 | End: 2023-01-01

## 2023-01-01 RX ORDER — IPRATROPIUM BROMIDE AND ALBUTEROL SULFATE 2.5; .5 MG/3ML; MG/3ML
3 SOLUTION RESPIRATORY (INHALATION)
Status: COMPLETED | OUTPATIENT
Start: 2023-01-01 | End: 2023-01-01

## 2023-01-01 RX ORDER — INSULIN ASPART 100 [IU]/ML
0-5 INJECTION, SOLUTION INTRAVENOUS; SUBCUTANEOUS
Status: DISCONTINUED | OUTPATIENT
Start: 2023-01-01 | End: 2023-01-01 | Stop reason: HOSPADM

## 2023-01-01 RX ORDER — FUROSEMIDE 10 MG/ML
120 INJECTION INTRAMUSCULAR; INTRAVENOUS
Status: COMPLETED | OUTPATIENT
Start: 2023-01-01 | End: 2023-01-01

## 2023-01-01 RX ORDER — ISOSORBIDE MONONITRATE 30 MG/1
30 TABLET, EXTENDED RELEASE ORAL DAILY
Status: DISCONTINUED | OUTPATIENT
Start: 2023-01-01 | End: 2023-01-01

## 2023-01-01 RX ORDER — INDOMETHACIN 25 MG/1
50 CAPSULE ORAL
Status: DISPENSED | OUTPATIENT
Start: 2023-01-01 | End: 2023-01-01

## 2023-01-01 RX ORDER — CLONIDINE 0.3 MG/24H
1 PATCH, EXTENDED RELEASE TRANSDERMAL
Status: DISCONTINUED | OUTPATIENT
Start: 2023-01-01 | End: 2023-01-01 | Stop reason: HOSPADM

## 2023-01-01 RX ORDER — HYDRALAZINE HYDROCHLORIDE 25 MG/1
25 TABLET, FILM COATED ORAL EVERY 8 HOURS
Status: DISCONTINUED | OUTPATIENT
Start: 2023-01-01 | End: 2023-01-01

## 2023-01-01 RX ORDER — ERYTHROMYCIN 5 MG/G
OINTMENT OPHTHALMIC EVERY 8 HOURS
Status: DISCONTINUED | OUTPATIENT
Start: 2023-01-01 | End: 2023-01-01 | Stop reason: HOSPADM

## 2023-01-01 RX ORDER — MAGNESIUM SULFATE HEPTAHYDRATE 40 MG/ML
2 INJECTION, SOLUTION INTRAVENOUS
Status: DISCONTINUED | OUTPATIENT
Start: 2023-01-01 | End: 2023-01-01

## 2023-01-01 RX ORDER — INSULIN ASPART 100 [IU]/ML
4 INJECTION, SOLUTION INTRAVENOUS; SUBCUTANEOUS
Status: DISCONTINUED | OUTPATIENT
Start: 2023-01-01 | End: 2023-01-01

## 2023-01-01 RX ORDER — SODIUM CHLORIDE 9 MG/ML
INJECTION, SOLUTION INTRAVENOUS ONCE
Status: CANCELLED | OUTPATIENT
Start: 2023-01-01 | End: 2023-01-01

## 2023-01-01 RX ORDER — HYDRALAZINE HYDROCHLORIDE 20 MG/ML
10 INJECTION INTRAMUSCULAR; INTRAVENOUS ONCE
Status: COMPLETED | OUTPATIENT
Start: 2023-01-01 | End: 2023-01-01

## 2023-01-01 RX ORDER — HALOPERIDOL 5 MG/ML
5 INJECTION INTRAMUSCULAR ONCE
Status: COMPLETED | OUTPATIENT
Start: 2023-01-01 | End: 2023-01-01

## 2023-01-01 RX ORDER — ONDANSETRON 4 MG/1
8 TABLET, ORALLY DISINTEGRATING ORAL EVERY 8 HOURS PRN
Status: DISCONTINUED | OUTPATIENT
Start: 2023-01-01 | End: 2023-01-01 | Stop reason: HOSPADM

## 2023-01-01 RX ORDER — PREDNISONE 20 MG/1
60 TABLET ORAL
Status: COMPLETED | OUTPATIENT
Start: 2023-01-01 | End: 2023-01-01

## 2023-01-01 RX ORDER — FUROSEMIDE 10 MG/ML
40 INJECTION INTRAMUSCULAR; INTRAVENOUS
Status: DISCONTINUED | OUTPATIENT
Start: 2023-01-01 | End: 2023-01-01

## 2023-01-01 RX ORDER — SODIUM CHLORIDE 0.9 % (FLUSH) 0.9 %
10 SYRINGE (ML) INJECTION EVERY 8 HOURS
Status: DISCONTINUED | OUTPATIENT
Start: 2023-01-01 | End: 2023-01-01

## 2023-01-01 RX ORDER — ONDANSETRON 2 MG/ML
4 INJECTION INTRAMUSCULAR; INTRAVENOUS ONCE
Status: DISCONTINUED | OUTPATIENT
Start: 2023-01-01 | End: 2023-01-01 | Stop reason: HOSPADM

## 2023-01-01 RX ORDER — INSULIN ASPART 100 [IU]/ML
3 INJECTION, SOLUTION INTRAVENOUS; SUBCUTANEOUS
Status: DISCONTINUED | OUTPATIENT
Start: 2023-01-01 | End: 2023-01-01

## 2023-01-01 RX ORDER — ALBUTEROL SULFATE 2.5 MG/.5ML
2.5 SOLUTION RESPIRATORY (INHALATION)
Status: COMPLETED | OUTPATIENT
Start: 2023-01-01 | End: 2023-01-01

## 2023-01-01 RX ORDER — HYDROXYZINE PAMOATE 25 MG/1
25 CAPSULE ORAL ONCE
Status: DISCONTINUED | OUTPATIENT
Start: 2023-01-01 | End: 2023-01-01

## 2023-01-01 RX ORDER — SODIUM CHLORIDE 9 MG/ML
INJECTION, SOLUTION INTRAVENOUS ONCE
Status: DISCONTINUED | OUTPATIENT
Start: 2023-01-01 | End: 2023-01-01 | Stop reason: HOSPADM

## 2023-01-01 RX ORDER — ISOSORBIDE MONONITRATE 30 MG/1
30 TABLET, EXTENDED RELEASE ORAL DAILY
Status: DISCONTINUED | OUTPATIENT
Start: 2023-01-01 | End: 2023-01-01 | Stop reason: HOSPADM

## 2023-01-01 RX ORDER — SODIUM,POTASSIUM PHOSPHATES 280-250MG
1 POWDER IN PACKET (EA) ORAL ONCE
Status: COMPLETED | OUTPATIENT
Start: 2023-01-01 | End: 2023-01-01

## 2023-01-01 RX ORDER — SODIUM CHLORIDE 9 MG/ML
INJECTION, SOLUTION INTRAVENOUS
Status: DISCONTINUED | OUTPATIENT
Start: 2023-01-01 | End: 2023-01-01 | Stop reason: HOSPADM

## 2023-01-01 RX ORDER — NIFEDIPINE 30 MG/1
90 TABLET, EXTENDED RELEASE ORAL DAILY
Status: DISCONTINUED | OUTPATIENT
Start: 2023-01-01 | End: 2023-01-01

## 2023-01-01 RX ORDER — TALC
6 POWDER (GRAM) TOPICAL NIGHTLY PRN
Status: DISCONTINUED | OUTPATIENT
Start: 2023-01-01 | End: 2023-01-01 | Stop reason: HOSPADM

## 2023-01-01 RX ORDER — FLUTICASONE PROPIONATE AND SALMETEROL 250; 50 UG/1; UG/1
1 POWDER RESPIRATORY (INHALATION) 2 TIMES DAILY
Status: DISCONTINUED | OUTPATIENT
Start: 2023-01-01 | End: 2023-01-01 | Stop reason: HOSPADM

## 2023-01-01 RX ORDER — LIDOCAINE HYDROCHLORIDE 10 MG/ML
5 INJECTION INFILTRATION; PERINEURAL ONCE
Status: COMPLETED | OUTPATIENT
Start: 2023-01-01 | End: 2023-01-01

## 2023-01-01 RX ORDER — HYDRALAZINE HYDROCHLORIDE 20 MG/ML
10 INJECTION INTRAMUSCULAR; INTRAVENOUS EVERY 6 HOURS PRN
Status: DISCONTINUED | OUTPATIENT
Start: 2023-01-01 | End: 2023-01-01 | Stop reason: HOSPADM

## 2023-01-01 RX ORDER — CETIRIZINE HYDROCHLORIDE 10 MG/1
10 TABLET ORAL DAILY PRN
Refills: 0
Start: 2023-01-01 | End: 2023-11-20

## 2023-01-01 RX ORDER — CHLORHEXIDINE GLUCONATE ORAL RINSE 1.2 MG/ML
15 SOLUTION DENTAL 2 TIMES DAILY
Status: DISCONTINUED | OUTPATIENT
Start: 2023-01-01 | End: 2023-01-01

## 2023-01-01 RX ORDER — NIFEDIPINE 30 MG/1
60 TABLET, EXTENDED RELEASE ORAL 2 TIMES DAILY
Status: DISCONTINUED | OUTPATIENT
Start: 2023-01-01 | End: 2023-01-01 | Stop reason: HOSPADM

## 2023-01-01 RX ORDER — LANOLIN ALCOHOL/MO/W.PET/CERES
400 CREAM (GRAM) TOPICAL ONCE
Status: COMPLETED | OUTPATIENT
Start: 2023-01-01 | End: 2023-01-01

## 2023-01-01 RX ORDER — DEXTROMETHORPHAN HYDROBROMIDE, GUAIFENESIN 5; 100 MG/5ML; MG/5ML
650 LIQUID ORAL EVERY 6 HOURS PRN
Refills: 0 | Status: ON HOLD
Start: 2023-01-01 | End: 2023-01-01 | Stop reason: SDUPTHER

## 2023-01-01 RX ORDER — CARVEDILOL 3.12 MG/1
3.12 TABLET ORAL
Status: COMPLETED | OUTPATIENT
Start: 2023-01-01 | End: 2023-01-01

## 2023-01-01 RX ORDER — MUPIROCIN 20 MG/G
OINTMENT TOPICAL 2 TIMES DAILY
Status: DISCONTINUED | OUTPATIENT
Start: 2023-01-01 | End: 2023-01-01 | Stop reason: HOSPADM

## 2023-01-01 RX ORDER — HYDRALAZINE HYDROCHLORIDE 20 MG/ML
5 INJECTION INTRAMUSCULAR; INTRAVENOUS ONCE
Status: COMPLETED | OUTPATIENT
Start: 2023-01-01 | End: 2023-01-01

## 2023-01-01 RX ORDER — ACETAMINOPHEN 325 MG/1
650 TABLET ORAL EVERY 6 HOURS PRN
Status: DISCONTINUED | OUTPATIENT
Start: 2023-01-01 | End: 2023-01-01 | Stop reason: HOSPADM

## 2023-01-01 RX ORDER — NICARDIPINE HYDROCHLORIDE 0.2 MG/ML
0-15 INJECTION INTRAVENOUS CONTINUOUS
Status: DISCONTINUED | OUTPATIENT
Start: 2023-01-01 | End: 2023-01-01

## 2023-01-01 RX ORDER — NOREPINEPHRINE BITARTRATE/D5W 4MG/250ML
0-.2 PLASTIC BAG, INJECTION (ML) INTRAVENOUS CONTINUOUS
Status: DISCONTINUED | OUTPATIENT
Start: 2023-01-01 | End: 2023-01-01

## 2023-01-01 RX ORDER — ACETAMINOPHEN 325 MG/1
650 TABLET ORAL EVERY 4 HOURS PRN
Status: DISCONTINUED | OUTPATIENT
Start: 2023-01-01 | End: 2023-01-01 | Stop reason: HOSPADM

## 2023-01-01 RX ORDER — SEVELAMER CARBONATE 800 MG/1
800 TABLET, FILM COATED ORAL 3 TIMES DAILY
Status: ON HOLD | COMMUNITY
Start: 2023-01-01 | End: 2023-01-01 | Stop reason: SDUPTHER

## 2023-01-01 RX ORDER — IODIXANOL 320 MG/ML
INJECTION, SOLUTION INTRAVASCULAR
Status: DISCONTINUED | OUTPATIENT
Start: 2023-01-01 | End: 2023-01-01 | Stop reason: HOSPADM

## 2023-01-01 RX ORDER — ONDANSETRON 8 MG/1
8 TABLET, ORALLY DISINTEGRATING ORAL EVERY 8 HOURS PRN
Status: DISCONTINUED | OUTPATIENT
Start: 2023-01-01 | End: 2023-01-01 | Stop reason: HOSPADM

## 2023-01-01 RX ORDER — ATORVASTATIN CALCIUM 40 MG/1
40 TABLET, FILM COATED ORAL DAILY
Status: DISCONTINUED | OUTPATIENT
Start: 2023-01-01 | End: 2023-01-01 | Stop reason: HOSPADM

## 2023-01-01 RX ORDER — DEXTROSE MONOHYDRATE 100 MG/ML
INJECTION, SOLUTION INTRAVENOUS CONTINUOUS
Status: DISCONTINUED | OUTPATIENT
Start: 2023-01-01 | End: 2023-01-01

## 2023-01-01 RX ORDER — SODIUM CHLORIDE 9 MG/ML
INJECTION, SOLUTION INTRAVENOUS ONCE
Status: DISCONTINUED | OUTPATIENT
Start: 2023-01-01 | End: 2023-01-01

## 2023-01-01 RX ORDER — CARVEDILOL 6.25 MG/1
6.25 TABLET ORAL 2 TIMES DAILY
Status: DISCONTINUED | OUTPATIENT
Start: 2023-01-01 | End: 2023-01-01

## 2023-01-01 RX ORDER — SODIUM CHLORIDE 0.9 % (FLUSH) 0.9 %
10 SYRINGE (ML) INJECTION EVERY 12 HOURS PRN
Status: DISCONTINUED | OUTPATIENT
Start: 2023-01-01 | End: 2023-01-01 | Stop reason: HOSPADM

## 2023-01-01 RX ORDER — SODIUM CHLORIDE 0.9 % (FLUSH) 0.9 %
10 SYRINGE (ML) INJECTION
Status: DISCONTINUED | OUTPATIENT
Start: 2023-01-01 | End: 2023-01-01 | Stop reason: HOSPADM

## 2023-01-01 RX ORDER — DEXTROMETHORPHAN HYDROBROMIDE, GUAIFENESIN 5; 100 MG/5ML; MG/5ML
650 LIQUID ORAL EVERY 8 HOURS PRN
Refills: 0
Start: 2023-01-01 | End: 2023-11-20

## 2023-01-01 RX ORDER — HYDRALAZINE HYDROCHLORIDE 20 MG/ML
10 INJECTION INTRAMUSCULAR; INTRAVENOUS ONCE AS NEEDED
Status: COMPLETED | OUTPATIENT
Start: 2023-01-01 | End: 2023-01-01

## 2023-01-01 RX ORDER — CALCIUM GLUCONATE 20 MG/ML
1 INJECTION, SOLUTION INTRAVENOUS EVERY 10 MIN PRN
Status: DISCONTINUED | OUTPATIENT
Start: 2023-01-01 | End: 2023-01-01

## 2023-01-01 RX ORDER — HYDRALAZINE HYDROCHLORIDE 25 MG/1
100 TABLET, FILM COATED ORAL EVERY 8 HOURS
Status: DISCONTINUED | OUTPATIENT
Start: 2023-01-01 | End: 2023-01-01

## 2023-01-01 RX ORDER — LISINOPRIL 20 MG/1
40 TABLET ORAL NIGHTLY
Status: DISCONTINUED | OUTPATIENT
Start: 2023-01-01 | End: 2023-01-01 | Stop reason: HOSPADM

## 2023-01-01 RX ORDER — SODIUM CHLORIDE 0.9 % (FLUSH) 0.9 %
10 SYRINGE (ML) INJECTION
Status: DISCONTINUED | OUTPATIENT
Start: 2023-01-01 | End: 2023-01-01

## 2023-01-01 RX ORDER — HALOPERIDOL 5 MG/ML
5 INJECTION INTRAMUSCULAR EVERY 6 HOURS PRN
Status: DISCONTINUED | OUTPATIENT
Start: 2023-01-01 | End: 2023-01-01

## 2023-01-01 RX ORDER — CARVEDILOL 6.25 MG/1
3.12 TABLET ORAL 2 TIMES DAILY
Status: ON HOLD | COMMUNITY
Start: 2023-01-01 | End: 2023-01-01 | Stop reason: HOSPADM

## 2023-01-01 RX ORDER — GLUCAGON 1 MG
1 KIT INJECTION
Status: DISCONTINUED | OUTPATIENT
Start: 2023-01-01 | End: 2023-01-01 | Stop reason: HOSPADM

## 2023-01-01 RX ORDER — FENTANYL CITRATE 50 UG/ML
INJECTION, SOLUTION INTRAMUSCULAR; INTRAVENOUS
Status: DISCONTINUED | OUTPATIENT
Start: 2023-01-01 | End: 2023-01-01 | Stop reason: HOSPADM

## 2023-01-01 RX ORDER — DEXTROSE MONOHYDRATE 100 MG/ML
INJECTION, SOLUTION INTRAVENOUS
Status: COMPLETED | OUTPATIENT
Start: 2023-01-01 | End: 2023-01-01

## 2023-01-01 RX ORDER — INSULIN ASPART 100 [IU]/ML
0-5 INJECTION, SOLUTION INTRAVENOUS; SUBCUTANEOUS
Status: DISCONTINUED | OUTPATIENT
Start: 2023-01-01 | End: 2023-01-01

## 2023-01-01 RX ORDER — IBUPROFEN 200 MG
24 TABLET ORAL
Status: DISCONTINUED | OUTPATIENT
Start: 2023-01-01 | End: 2023-01-01 | Stop reason: HOSPADM

## 2023-01-01 RX ORDER — DEXTROSE MONOHYDRATE AND SODIUM CHLORIDE 5; .9 G/100ML; G/100ML
INJECTION, SOLUTION INTRAVENOUS CONTINUOUS
Status: DISCONTINUED | OUTPATIENT
Start: 2023-01-01 | End: 2023-01-01

## 2023-01-01 RX ORDER — GLUCAGON 1 MG
1 KIT INJECTION
Status: DISCONTINUED | OUTPATIENT
Start: 2023-01-01 | End: 2023-01-01

## 2023-01-01 RX ORDER — QUETIAPINE FUMARATE 25 MG/1
25 TABLET, FILM COATED ORAL NIGHTLY PRN
Status: DISCONTINUED | OUTPATIENT
Start: 2023-01-01 | End: 2023-01-01 | Stop reason: HOSPADM

## 2023-01-01 RX ORDER — ASPIRIN 81 MG/1
81 TABLET ORAL DAILY
Status: DISCONTINUED | OUTPATIENT
Start: 2023-01-01 | End: 2023-01-01

## 2023-01-01 RX ORDER — IBUPROFEN/PSEUDOEPHEDRINE HCL 200MG-30MG
9 TABLET ORAL NIGHTLY PRN
Start: 2023-01-01 | End: 2023-11-20

## 2023-01-01 RX ORDER — QUETIAPINE FUMARATE 25 MG/1
25 TABLET, FILM COATED ORAL NIGHTLY PRN
Status: DISCONTINUED | OUTPATIENT
Start: 2023-01-01 | End: 2023-01-01

## 2023-01-01 RX ORDER — SEVELAMER CARBONATE 800 MG/1
800 TABLET, FILM COATED ORAL 3 TIMES DAILY
Status: DISCONTINUED | OUTPATIENT
Start: 2023-01-01 | End: 2023-01-01 | Stop reason: HOSPADM

## 2023-01-01 RX ORDER — MORPHINE SULFATE 2 MG/ML
2 INJECTION, SOLUTION INTRAMUSCULAR; INTRAVENOUS EVERY 6 HOURS PRN
Status: DISCONTINUED | OUTPATIENT
Start: 2023-01-01 | End: 2023-01-01

## 2023-01-01 RX ORDER — UMECLIDINIUM 62.5 UG/1
62.5 AEROSOL, POWDER ORAL DAILY
Status: ON HOLD | COMMUNITY
End: 2023-01-01 | Stop reason: HOSPADM

## 2023-01-01 RX ORDER — IPRATROPIUM BROMIDE AND ALBUTEROL SULFATE 2.5; .5 MG/3ML; MG/3ML
3 SOLUTION RESPIRATORY (INHALATION) EVERY 6 HOURS PRN
Status: DISCONTINUED | OUTPATIENT
Start: 2023-01-01 | End: 2023-01-01 | Stop reason: HOSPADM

## 2023-01-01 RX ORDER — ACETAMINOPHEN 325 MG/1
650 TABLET ORAL EVERY 6 HOURS PRN
Status: CANCELLED | OUTPATIENT
Start: 2023-01-01

## 2023-01-01 RX ORDER — FLUTICASONE FUROATE AND VILANTEROL 100; 25 UG/1; UG/1
1 POWDER RESPIRATORY (INHALATION) DAILY
Status: DISCONTINUED | OUTPATIENT
Start: 2023-01-01 | End: 2023-01-01 | Stop reason: HOSPADM

## 2023-01-01 RX ORDER — DEXTROSE 50 % IN WATER (D50W) INTRAVENOUS SYRINGE
25 ONCE
Status: DISCONTINUED | OUTPATIENT
Start: 2023-01-01 | End: 2023-01-01 | Stop reason: HOSPADM

## 2023-01-01 RX ORDER — HYDRALAZINE HYDROCHLORIDE 25 MG/1
25 TABLET, FILM COATED ORAL EVERY 8 HOURS PRN
Status: DISCONTINUED | OUTPATIENT
Start: 2023-01-01 | End: 2023-01-01 | Stop reason: HOSPADM

## 2023-01-01 RX ORDER — ALBUTEROL SULFATE 90 UG/1
2 AEROSOL, METERED RESPIRATORY (INHALATION) EVERY 6 HOURS PRN
Status: DISCONTINUED | OUTPATIENT
Start: 2023-01-01 | End: 2023-01-01 | Stop reason: HOSPADM

## 2023-01-01 RX ORDER — BISACODYL 10 MG
10 SUPPOSITORY, RECTAL RECTAL DAILY PRN
Status: DISCONTINUED | OUTPATIENT
Start: 2023-01-01 | End: 2023-01-01 | Stop reason: HOSPADM

## 2023-01-01 RX ORDER — NALOXONE HCL 0.4 MG/ML
0.02 VIAL (ML) INJECTION
Status: DISCONTINUED | OUTPATIENT
Start: 2023-01-01 | End: 2023-01-01 | Stop reason: HOSPADM

## 2023-01-01 RX ORDER — DEXTROSE MONOHYDRATE 100 MG/ML
INJECTION, SOLUTION INTRAVENOUS
Status: DISCONTINUED | OUTPATIENT
Start: 2023-01-01 | End: 2023-01-01 | Stop reason: HOSPADM

## 2023-01-01 RX ORDER — LABETALOL HCL 20 MG/4 ML
10 SYRINGE (ML) INTRAVENOUS
Status: COMPLETED | OUTPATIENT
Start: 2023-01-01 | End: 2023-01-01

## 2023-01-01 RX ORDER — IPRATROPIUM BROMIDE AND ALBUTEROL SULFATE 2.5; .5 MG/3ML; MG/3ML
3 SOLUTION RESPIRATORY (INHALATION)
Status: DISCONTINUED | OUTPATIENT
Start: 2023-01-01 | End: 2023-01-01

## 2023-01-01 RX ORDER — CALCIUM GLUCONATE 20 MG/ML
1 INJECTION, SOLUTION INTRAVENOUS ONCE
Status: DISCONTINUED | OUTPATIENT
Start: 2023-01-01 | End: 2023-01-01

## 2023-01-01 RX ORDER — ASPIRIN 81 MG/1
81 TABLET ORAL DAILY
Refills: 0
Start: 2023-01-01 | End: 2023-11-20

## 2023-01-01 RX ORDER — TRIAMCINOLONE ACETONIDE 0.25 MG/G
CREAM TOPICAL 2 TIMES DAILY
Status: ON HOLD | COMMUNITY
Start: 2023-01-01 | End: 2023-01-01 | Stop reason: HOSPADM

## 2023-01-01 RX ORDER — LISINOPRIL 20 MG/1
20 TABLET ORAL NIGHTLY
Status: DISCONTINUED | OUTPATIENT
Start: 2023-01-01 | End: 2023-01-01

## 2023-01-01 RX ORDER — PROPOFOL 10 MG/ML
INJECTION, EMULSION INTRAVENOUS
Status: DISCONTINUED
Start: 2023-01-01 | End: 2023-01-01 | Stop reason: WASHOUT

## 2023-01-01 RX ORDER — MUPIROCIN 20 MG/G
OINTMENT TOPICAL 2 TIMES DAILY
Status: DISPENSED | OUTPATIENT
Start: 2023-01-01 | End: 2023-01-01

## 2023-01-01 RX ORDER — DEXTROSE 40 %
15 GEL (GRAM) ORAL
Status: DISCONTINUED | OUTPATIENT
Start: 2023-01-01 | End: 2023-01-01 | Stop reason: HOSPADM

## 2023-01-01 RX ORDER — CLONIDINE 0.3 MG/24H
PATCH, EXTENDED RELEASE TRANSDERMAL
Status: ON HOLD | COMMUNITY
Start: 2022-06-02 | End: 2023-01-01 | Stop reason: HOSPADM

## 2023-01-01 RX ORDER — IPRATROPIUM BROMIDE AND ALBUTEROL SULFATE 2.5; .5 MG/3ML; MG/3ML
3 SOLUTION RESPIRATORY (INHALATION) EVERY 6 HOURS PRN
Status: DISCONTINUED | OUTPATIENT
Start: 2023-01-01 | End: 2023-01-01

## 2023-01-01 RX ORDER — LISINOPRIL 40 MG/1
40 TABLET ORAL NIGHTLY
Qty: 90 TABLET | Refills: 3
Start: 2023-01-01 | End: 2023-11-20

## 2023-01-01 RX ORDER — NIFEDIPINE 30 MG/1
90 TABLET, EXTENDED RELEASE ORAL DAILY
Status: DISCONTINUED | OUTPATIENT
Start: 2023-01-01 | End: 2023-01-01 | Stop reason: HOSPADM

## 2023-01-01 RX ORDER — LABETALOL HCL 20 MG/4 ML
10 SYRINGE (ML) INTRAVENOUS EVERY 6 HOURS PRN
Status: DISCONTINUED | OUTPATIENT
Start: 2023-01-01 | End: 2023-01-01 | Stop reason: HOSPADM

## 2023-01-01 RX ORDER — DEXTROSE 50 % IN WATER (D50W) INTRAVENOUS SYRINGE
12.5 ONCE
Status: COMPLETED | OUTPATIENT
Start: 2023-01-01 | End: 2023-01-01

## 2023-01-01 RX ORDER — HEPARIN SODIUM 5000 [USP'U]/ML
5000 INJECTION, SOLUTION INTRAVENOUS; SUBCUTANEOUS EVERY 12 HOURS
Status: DISCONTINUED | OUTPATIENT
Start: 2023-01-01 | End: 2023-01-01

## 2023-01-01 RX ORDER — MIDAZOLAM HYDROCHLORIDE 1 MG/ML
INJECTION, SOLUTION INTRAMUSCULAR; INTRAVENOUS
Status: DISCONTINUED | OUTPATIENT
Start: 2023-01-01 | End: 2023-01-01

## 2023-01-01 RX ORDER — PROMETHAZINE HYDROCHLORIDE 12.5 MG/1
25 TABLET ORAL EVERY 6 HOURS PRN
Status: DISCONTINUED | OUTPATIENT
Start: 2023-01-01 | End: 2023-01-01 | Stop reason: HOSPADM

## 2023-01-01 RX ORDER — FLUTICASONE PROPIONATE AND SALMETEROL 250; 50 UG/1; UG/1
1 POWDER RESPIRATORY (INHALATION) 2 TIMES DAILY
Refills: 0
Start: 2023-01-01 | End: 2023-11-20

## 2023-01-01 RX ORDER — HYDRALAZINE HYDROCHLORIDE 20 MG/ML
10 INJECTION INTRAMUSCULAR; INTRAVENOUS EVERY 8 HOURS PRN
Status: DISCONTINUED | OUTPATIENT
Start: 2023-01-01 | End: 2023-01-01 | Stop reason: HOSPADM

## 2023-01-01 RX ORDER — ISOSORBIDE MONONITRATE 30 MG/1
30 TABLET, EXTENDED RELEASE ORAL DAILY
Qty: 90 TABLET | Refills: 3
Start: 2023-01-01 | End: 2023-11-20

## 2023-01-01 RX ORDER — IPRATROPIUM BROMIDE AND ALBUTEROL SULFATE 2.5; .5 MG/3ML; MG/3ML
3 SOLUTION RESPIRATORY (INHALATION) EVERY 4 HOURS PRN
Status: DISCONTINUED | OUTPATIENT
Start: 2023-01-01 | End: 2023-01-01

## 2023-01-01 RX ORDER — DOXYCYCLINE HYCLATE 100 MG
100 TABLET ORAL EVERY 12 HOURS
Qty: 2 TABLET | Refills: 0 | Status: ON HOLD | OUTPATIENT
Start: 2023-01-01 | End: 2023-01-01 | Stop reason: HOSPADM

## 2023-01-01 RX ORDER — IPRATROPIUM BROMIDE AND ALBUTEROL SULFATE 2.5; .5 MG/3ML; MG/3ML
3 SOLUTION RESPIRATORY (INHALATION) EVERY 4 HOURS PRN
Status: DISCONTINUED | OUTPATIENT
Start: 2023-01-01 | End: 2023-01-01 | Stop reason: HOSPADM

## 2023-01-01 RX ORDER — PHENYLEPHRINE HCL IN 0.9% NACL 1 MG/10 ML
SYRINGE (ML) INTRAVENOUS
Status: DISPENSED
Start: 2023-01-01 | End: 2023-01-01

## 2023-01-01 RX ORDER — ACETAMINOPHEN 325 MG/1
650 TABLET ORAL EVERY 6 HOURS PRN
Status: DISCONTINUED | OUTPATIENT
Start: 2023-01-01 | End: 2023-01-01

## 2023-01-01 RX ORDER — HEPARIN SODIUM 5000 [USP'U]/ML
5000 INJECTION, SOLUTION INTRAVENOUS; SUBCUTANEOUS EVERY 8 HOURS
Status: DISCONTINUED | OUTPATIENT
Start: 2023-01-01 | End: 2023-01-01

## 2023-01-01 RX ORDER — NIFEDIPINE 60 MG/1
60 TABLET, EXTENDED RELEASE ORAL 2 TIMES DAILY
Status: DISCONTINUED | OUTPATIENT
Start: 2023-01-01 | End: 2023-01-01 | Stop reason: HOSPADM

## 2023-01-01 RX ORDER — DEXTROSE MONOHYDRATE, SODIUM CHLORIDE, AND POTASSIUM CHLORIDE 50; 1.49; 4.5 G/1000ML; G/1000ML; G/1000ML
INJECTION, SOLUTION INTRAVENOUS CONTINUOUS
Status: DISCONTINUED | OUTPATIENT
Start: 2023-01-01 | End: 2023-01-01

## 2023-01-01 RX ORDER — INSULIN ASPART 100 [IU]/ML
10 INJECTION, SOLUTION INTRAVENOUS; SUBCUTANEOUS ONCE
Status: COMPLETED | OUTPATIENT
Start: 2023-01-01 | End: 2023-01-01

## 2023-01-01 RX ORDER — ALBUTEROL SULFATE 2.5 MG/.5ML
10 SOLUTION RESPIRATORY (INHALATION) ONCE
Status: DISCONTINUED | OUTPATIENT
Start: 2023-01-01 | End: 2023-01-01

## 2023-01-01 RX ORDER — NICARDIPINE HYDROCHLORIDE 0.2 MG/ML
5 INJECTION INTRAVENOUS CONTINUOUS
Status: DISCONTINUED | OUTPATIENT
Start: 2023-01-01 | End: 2023-01-01

## 2023-01-01 RX ORDER — HYDROCODONE BITARTRATE AND ACETAMINOPHEN 500; 5 MG/1; MG/1
TABLET ORAL CONTINUOUS
Status: DISCONTINUED | OUTPATIENT
Start: 2023-01-01 | End: 2023-01-01

## 2023-01-01 RX ORDER — CARVEDILOL 12.5 MG/1
12.5 TABLET ORAL 2 TIMES DAILY
Qty: 60 TABLET | Refills: 11 | Status: ON HOLD
Start: 2023-01-01 | End: 2023-01-01 | Stop reason: SDUPTHER

## 2023-01-01 RX ORDER — ROCURONIUM BROMIDE 10 MG/ML
INJECTION, SOLUTION INTRAVENOUS
Status: DISCONTINUED
Start: 2023-01-01 | End: 2023-01-01 | Stop reason: WASHOUT

## 2023-01-01 RX ORDER — INSULIN ASPART 100 [IU]/ML
4 INJECTION, SOLUTION INTRAVENOUS; SUBCUTANEOUS
Status: DISCONTINUED | OUTPATIENT
Start: 2023-01-01 | End: 2023-01-01 | Stop reason: HOSPADM

## 2023-01-01 RX ORDER — ATORVASTATIN CALCIUM 40 MG/1
40 TABLET, FILM COATED ORAL NIGHTLY
Qty: 90 TABLET | Refills: 3
Start: 2023-01-01 | End: 2023-11-20

## 2023-01-01 RX ORDER — INSULIN ASPART 100 [IU]/ML
0-10 INJECTION, SOLUTION INTRAVENOUS; SUBCUTANEOUS EVERY 6 HOURS PRN
Status: DISCONTINUED | OUTPATIENT
Start: 2023-01-01 | End: 2023-01-01

## 2023-01-01 RX ORDER — FLUOXETINE HYDROCHLORIDE 20 MG/1
40 CAPSULE ORAL DAILY
Status: DISCONTINUED | OUTPATIENT
Start: 2023-01-01 | End: 2023-01-01 | Stop reason: HOSPADM

## 2023-01-01 RX ORDER — DEXTROSE MONOHYDRATE 100 MG/ML
INJECTION, SOLUTION INTRAVENOUS
Status: DISCONTINUED | OUTPATIENT
Start: 2023-01-01 | End: 2023-01-01

## 2023-01-01 RX ORDER — INFANT FORMULA WITH IRON
POWDER (GRAM) ORAL 2 TIMES DAILY
Status: DISCONTINUED | OUTPATIENT
Start: 2023-01-01 | End: 2023-01-01 | Stop reason: HOSPADM

## 2023-01-01 RX ORDER — INSULIN ASPART 100 [IU]/ML
10 INJECTION, SOLUTION INTRAVENOUS; SUBCUTANEOUS
Status: DISCONTINUED | OUTPATIENT
Start: 2023-01-01 | End: 2023-01-01 | Stop reason: HOSPADM

## 2023-01-01 RX ORDER — CARVEDILOL 6.25 MG/1
6.25 TABLET ORAL 2 TIMES DAILY
Status: DISCONTINUED | OUTPATIENT
Start: 2023-01-01 | End: 2023-01-01 | Stop reason: HOSPADM

## 2023-01-01 RX ORDER — HYDRALAZINE HYDROCHLORIDE 50 MG/1
100 TABLET, FILM COATED ORAL EVERY 8 HOURS
Status: DISCONTINUED | OUTPATIENT
Start: 2023-01-01 | End: 2023-01-01 | Stop reason: HOSPADM

## 2023-01-01 RX ORDER — LISINOPRIL 20 MG/1
20 TABLET ORAL DAILY
Status: DISCONTINUED | OUTPATIENT
Start: 2023-01-01 | End: 2023-01-01

## 2023-01-01 RX ORDER — INSULIN ASPART 100 [IU]/ML
5 INJECTION, SOLUTION INTRAVENOUS; SUBCUTANEOUS
Qty: 13.5 ML | Refills: 3 | Status: SHIPPED | OUTPATIENT
Start: 2023-01-01 | End: 2023-01-01 | Stop reason: SDUPTHER

## 2023-01-01 RX ORDER — KETAMINE HCL IN 0.9 % NACL 50 MG/5 ML
SYRINGE (ML) INTRAVENOUS
Status: DISCONTINUED | OUTPATIENT
Start: 2023-01-01 | End: 2023-01-01

## 2023-01-01 RX ORDER — HYDRALAZINE HYDROCHLORIDE 20 MG/ML
20 INJECTION INTRAMUSCULAR; INTRAVENOUS
Status: COMPLETED | OUTPATIENT
Start: 2023-01-01 | End: 2023-01-01

## 2023-01-01 RX ORDER — DEXMEDETOMIDINE HYDROCHLORIDE 4 UG/ML
0-1.4 INJECTION, SOLUTION INTRAVENOUS CONTINUOUS
Status: DISCONTINUED | OUTPATIENT
Start: 2023-01-01 | End: 2023-01-01

## 2023-01-01 RX ORDER — ROCURONIUM BROMIDE 10 MG/ML
1 INJECTION, SOLUTION INTRAVENOUS ONCE
Status: COMPLETED | OUTPATIENT
Start: 2023-01-01 | End: 2023-01-01

## 2023-01-01 RX ORDER — SODIUM CHLORIDE 9 MG/ML
INJECTION, SOLUTION INTRAVENOUS ONCE
Status: COMPLETED | OUTPATIENT
Start: 2023-01-01 | End: 2023-01-01

## 2023-01-01 RX ORDER — IPRATROPIUM BROMIDE AND ALBUTEROL SULFATE 2.5; .5 MG/3ML; MG/3ML
3 SOLUTION RESPIRATORY (INHALATION)
Status: DISCONTINUED | OUTPATIENT
Start: 2023-01-01 | End: 2023-01-01 | Stop reason: HOSPADM

## 2023-01-01 RX ORDER — HEPARIN SODIUM 1000 [USP'U]/ML
1000 INJECTION, SOLUTION INTRAVENOUS; SUBCUTANEOUS
Status: DISCONTINUED | OUTPATIENT
Start: 2023-01-01 | End: 2023-01-01 | Stop reason: HOSPADM

## 2023-01-01 RX ORDER — DEXTROSE 50 % IN WATER (D50W) INTRAVENOUS SYRINGE
25
Status: COMPLETED | OUTPATIENT
Start: 2023-01-01 | End: 2023-01-01

## 2023-01-01 RX ORDER — INSULIN ASPART 100 [IU]/ML
5 INJECTION, SOLUTION INTRAVENOUS; SUBCUTANEOUS
Qty: 15 ML | Refills: 3 | Status: ON HOLD | OUTPATIENT
Start: 2023-01-01 | End: 2023-01-01 | Stop reason: HOSPADM

## 2023-01-01 RX ORDER — VERAPAMIL HYDROCHLORIDE 2.5 MG/ML
INJECTION, SOLUTION INTRAVENOUS
Status: DISCONTINUED | OUTPATIENT
Start: 2023-01-01 | End: 2023-01-01 | Stop reason: HOSPADM

## 2023-01-01 RX ORDER — POTASSIUM CHLORIDE 20 MEQ/1
40 TABLET, EXTENDED RELEASE ORAL ONCE
Status: COMPLETED | OUTPATIENT
Start: 2023-01-01 | End: 2023-01-01

## 2023-01-01 RX ORDER — INSULIN ASPART 100 [IU]/ML
4 INJECTION, SOLUTION INTRAVENOUS; SUBCUTANEOUS EVERY 6 HOURS
Status: DISCONTINUED | OUTPATIENT
Start: 2023-01-01 | End: 2023-01-01

## 2023-01-01 RX ORDER — ATORVASTATIN CALCIUM 40 MG/1
40 TABLET, FILM COATED ORAL NIGHTLY
Qty: 90 TABLET | Refills: 3 | Status: ON HOLD | OUTPATIENT
Start: 2023-01-01 | End: 2023-01-01 | Stop reason: SDUPTHER

## 2023-01-01 RX ORDER — OXYMETAZOLINE HCL 0.05 %
2 SPRAY, NON-AEROSOL (ML) NASAL 2 TIMES DAILY PRN
Status: DISCONTINUED | OUTPATIENT
Start: 2023-01-01 | End: 2023-01-01 | Stop reason: HOSPADM

## 2023-01-01 RX ORDER — MAGNESIUM SULFATE HEPTAHYDRATE 40 MG/ML
2 INJECTION, SOLUTION INTRAVENOUS
Status: DISPENSED | OUTPATIENT
Start: 2023-01-01 | End: 2023-01-01

## 2023-01-01 RX ORDER — DEXMEDETOMIDINE HYDROCHLORIDE 4 UG/ML
INJECTION, SOLUTION INTRAVENOUS
Status: COMPLETED
Start: 2023-01-01 | End: 2023-01-01

## 2023-01-01 RX ORDER — INSULIN DETEMIR 100 [IU]/ML
20 INJECTION, SOLUTION SUBCUTANEOUS NIGHTLY
Refills: 0 | Status: ON HOLD
Start: 2023-01-01 | End: 2023-01-01 | Stop reason: HOSPADM

## 2023-01-01 RX ORDER — ALBUMIN HUMAN 250 G/1000ML
SOLUTION INTRAVENOUS
Status: DISCONTINUED
Start: 2023-01-01 | End: 2023-01-01 | Stop reason: HOSPADM

## 2023-01-01 RX ORDER — INSULIN ASPART 100 [IU]/ML
8 INJECTION, SOLUTION INTRAVENOUS; SUBCUTANEOUS
Status: DISCONTINUED | OUTPATIENT
Start: 2023-01-01 | End: 2023-01-01 | Stop reason: HOSPADM

## 2023-01-01 RX ORDER — CARVEDILOL 3.12 MG/1
3.12 TABLET ORAL 2 TIMES DAILY
Status: DISCONTINUED | OUTPATIENT
Start: 2023-01-01 | End: 2023-01-01

## 2023-01-01 RX ORDER — PANTOPRAZOLE SODIUM 40 MG/10ML
40 INJECTION, POWDER, LYOPHILIZED, FOR SOLUTION INTRAVENOUS ONCE
Status: COMPLETED | OUTPATIENT
Start: 2023-01-01 | End: 2023-01-01

## 2023-01-01 RX ORDER — ONDANSETRON 2 MG/ML
4 INJECTION INTRAMUSCULAR; INTRAVENOUS EVERY 8 HOURS PRN
Status: DISCONTINUED | OUTPATIENT
Start: 2023-01-01 | End: 2023-01-01

## 2023-01-01 RX ORDER — INSULIN ASPART 100 [IU]/ML
0-10 INJECTION, SOLUTION INTRAVENOUS; SUBCUTANEOUS
Status: DISCONTINUED | OUTPATIENT
Start: 2023-01-01 | End: 2023-01-01

## 2023-01-01 RX ORDER — ATORVASTATIN CALCIUM 20 MG/1
40 TABLET, FILM COATED ORAL DAILY
Status: DISCONTINUED | OUTPATIENT
Start: 2023-01-01 | End: 2023-01-01 | Stop reason: HOSPADM

## 2023-01-01 RX ORDER — PANTOPRAZOLE SODIUM 40 MG/10ML
80 INJECTION, POWDER, LYOPHILIZED, FOR SOLUTION INTRAVENOUS
Status: COMPLETED | OUTPATIENT
Start: 2023-01-01 | End: 2023-01-01

## 2023-01-01 RX ORDER — IBUPROFEN 200 MG
16 TABLET ORAL
Status: DISCONTINUED | OUTPATIENT
Start: 2023-01-01 | End: 2023-01-01

## 2023-01-01 RX ORDER — IBUPROFEN 200 MG
24 TABLET ORAL
Status: DISCONTINUED | OUTPATIENT
Start: 2023-01-01 | End: 2023-01-01

## 2023-01-01 RX ORDER — HEPARIN SODIUM,PORCINE/D5W 25000/250
0-40 INTRAVENOUS SOLUTION INTRAVENOUS CONTINUOUS
Status: DISCONTINUED | OUTPATIENT
Start: 2023-01-01 | End: 2023-01-01

## 2023-01-01 RX ORDER — PETROLATUM,WHITE
OINTMENT IN PACKET (GRAM) TOPICAL 2 TIMES DAILY
Qty: 5 G | Refills: 0
Start: 2023-01-01 | End: 2023-11-20

## 2023-01-01 RX ORDER — LORAZEPAM 2 MG/ML
1 INJECTION INTRAMUSCULAR EVERY 4 HOURS PRN
Status: DISCONTINUED | OUTPATIENT
Start: 2023-01-01 | End: 2023-01-01 | Stop reason: HOSPADM

## 2023-01-01 RX ORDER — IPRATROPIUM BROMIDE AND ALBUTEROL SULFATE 2.5; .5 MG/3ML; MG/3ML
3 SOLUTION RESPIRATORY (INHALATION) EVERY 6 HOURS
Status: DISCONTINUED | OUTPATIENT
Start: 2023-01-01 | End: 2023-01-01

## 2023-01-01 RX ORDER — LINEZOLID 2 MG/ML
600 INJECTION, SOLUTION INTRAVENOUS
Status: DISCONTINUED | OUTPATIENT
Start: 2023-01-01 | End: 2023-01-01 | Stop reason: HOSPADM

## 2023-01-01 RX ORDER — FAMOTIDINE 20 MG/1
20 TABLET, FILM COATED ORAL DAILY
Status: DISCONTINUED | OUTPATIENT
Start: 2023-01-01 | End: 2023-01-01 | Stop reason: HOSPADM

## 2023-01-01 RX ORDER — CARVEDILOL 12.5 MG/1
12.5 TABLET ORAL 2 TIMES DAILY
Status: DISCONTINUED | OUTPATIENT
Start: 2023-01-01 | End: 2023-01-01 | Stop reason: HOSPADM

## 2023-01-01 RX ORDER — INSULIN ASPART 100 [IU]/ML
1-10 INJECTION, SOLUTION INTRAVENOUS; SUBCUTANEOUS
Status: DISCONTINUED | OUTPATIENT
Start: 2023-01-01 | End: 2023-01-01 | Stop reason: HOSPADM

## 2023-01-01 RX ORDER — ONDANSETRON HYDROCHLORIDE 8 MG/1
1 TABLET, FILM COATED ORAL 3 TIMES DAILY PRN
Status: ON HOLD | COMMUNITY
Start: 2023-01-01 | End: 2023-01-01 | Stop reason: SDUPTHER

## 2023-01-01 RX ORDER — TALC
9 POWDER (GRAM) TOPICAL NIGHTLY PRN
Status: DISCONTINUED | OUTPATIENT
Start: 2023-01-01 | End: 2023-01-01 | Stop reason: HOSPADM

## 2023-01-01 RX ORDER — ALBUMIN HUMAN 250 G/1000ML
SOLUTION INTRAVENOUS
Status: DISCONTINUED | OUTPATIENT
Start: 2023-01-01 | End: 2023-01-01 | Stop reason: HOSPADM

## 2023-01-01 RX ORDER — MAGNESIUM SULFATE HEPTAHYDRATE 40 MG/ML
2 INJECTION, SOLUTION INTRAVENOUS ONCE
Status: COMPLETED | OUTPATIENT
Start: 2023-01-01 | End: 2023-01-01

## 2023-01-01 RX ORDER — CETIRIZINE HYDROCHLORIDE 5 MG/1
5 TABLET ORAL DAILY PRN
Qty: 30 TABLET | Refills: 0 | Status: ON HOLD | OUTPATIENT
Start: 2023-01-01 | End: 2023-01-01 | Stop reason: HOSPADM

## 2023-01-01 RX ORDER — CLONIDINE 0.3 MG/24H
1 PATCH, EXTENDED RELEASE TRANSDERMAL
Status: DISCONTINUED | OUTPATIENT
Start: 2023-01-01 | End: 2023-01-01

## 2023-01-01 RX ORDER — NIFEDIPINE 30 MG/1
30 TABLET, EXTENDED RELEASE ORAL ONCE
Status: COMPLETED | OUTPATIENT
Start: 2023-01-01 | End: 2023-01-01

## 2023-01-01 RX ORDER — FAMOTIDINE 10 MG/ML
20 INJECTION INTRAVENOUS DAILY
Status: DISCONTINUED | OUTPATIENT
Start: 2023-01-01 | End: 2023-01-01

## 2023-01-01 RX ORDER — INSULIN ASPART 100 [IU]/ML
2 INJECTION, SOLUTION INTRAVENOUS; SUBCUTANEOUS
Status: DISCONTINUED | OUTPATIENT
Start: 2023-01-01 | End: 2023-01-01

## 2023-01-01 RX ORDER — PANTOPRAZOLE SODIUM 40 MG/10ML
40 INJECTION, POWDER, LYOPHILIZED, FOR SOLUTION INTRAVENOUS 2 TIMES DAILY
Status: DISCONTINUED | OUTPATIENT
Start: 2023-01-01 | End: 2023-01-01

## 2023-01-01 RX ORDER — POLYETHYLENE GLYCOL 3350 17 G/17G
17 POWDER, FOR SOLUTION ORAL DAILY PRN
Status: DISCONTINUED | OUTPATIENT
Start: 2023-01-01 | End: 2023-01-01 | Stop reason: HOSPADM

## 2023-01-01 RX ORDER — HYDRALAZINE HYDROCHLORIDE 50 MG/1
100 TABLET, FILM COATED ORAL EVERY 8 HOURS
Status: DISCONTINUED | OUTPATIENT
Start: 2023-01-01 | End: 2023-01-01

## 2023-01-01 RX ORDER — INSULIN ASPART 100 [IU]/ML
0-5 INJECTION, SOLUTION INTRAVENOUS; SUBCUTANEOUS EVERY 6 HOURS PRN
Status: DISCONTINUED | OUTPATIENT
Start: 2023-01-01 | End: 2023-01-01

## 2023-01-01 RX ORDER — PROMETHAZINE HYDROCHLORIDE 25 MG/1
25 TABLET ORAL EVERY 6 HOURS PRN
Status: DISCONTINUED | OUTPATIENT
Start: 2023-01-01 | End: 2023-01-01 | Stop reason: HOSPADM

## 2023-01-01 RX ORDER — FUROSEMIDE 10 MG/ML
80 INJECTION INTRAMUSCULAR; INTRAVENOUS
Status: ACTIVE | OUTPATIENT
Start: 2023-01-01 | End: 2023-01-01

## 2023-01-01 RX ORDER — HEPARIN SODIUM 1000 [USP'U]/ML
INJECTION, SOLUTION INTRAVENOUS; SUBCUTANEOUS
Status: DISCONTINUED | OUTPATIENT
Start: 2023-01-01 | End: 2023-01-01

## 2023-01-01 RX ORDER — MUPIROCIN 20 MG/G
OINTMENT TOPICAL 2 TIMES DAILY
Status: COMPLETED | OUTPATIENT
Start: 2023-01-01 | End: 2023-01-01

## 2023-01-01 RX ORDER — SODIUM CHLORIDE 9 MG/ML
INJECTION, SOLUTION INTRAVENOUS
Status: CANCELLED | OUTPATIENT
Start: 2023-01-01

## 2023-01-01 RX ORDER — NOREPINEPHRINE BITARTRATE/D5W 4MG/250ML
0-3 PLASTIC BAG, INJECTION (ML) INTRAVENOUS CONTINUOUS
Status: DISCONTINUED | OUTPATIENT
Start: 2023-01-01 | End: 2023-01-01

## 2023-01-01 RX ORDER — DEXAMETHASONE SODIUM PHOSPHATE 4 MG/ML
6 INJECTION, SOLUTION INTRA-ARTICULAR; INTRALESIONAL; INTRAMUSCULAR; INTRAVENOUS; SOFT TISSUE EVERY 24 HOURS
Status: DISCONTINUED | OUTPATIENT
Start: 2023-01-01 | End: 2023-01-01

## 2023-01-01 RX ORDER — NIFEDIPINE 30 MG/1
30 TABLET, EXTENDED RELEASE ORAL
Status: COMPLETED | OUTPATIENT
Start: 2023-01-01 | End: 2023-01-01

## 2023-01-01 RX ORDER — INSULIN DETEMIR 100 [IU]/ML
10 INJECTION, SOLUTION SUBCUTANEOUS EVERY 12 HOURS
Status: ON HOLD | COMMUNITY
Start: 2023-01-01 | End: 2023-01-01 | Stop reason: SDUPTHER

## 2023-01-01 RX ORDER — NIFEDIPINE 60 MG/1
60 TABLET, EXTENDED RELEASE ORAL 2 TIMES DAILY
Qty: 60 TABLET | Refills: 11 | Status: ON HOLD | OUTPATIENT
Start: 2023-01-01 | End: 2023-01-01 | Stop reason: SDUPTHER

## 2023-01-01 RX ORDER — HYDROXYZINE PAMOATE 25 MG/1
25 CAPSULE ORAL ONCE
Status: COMPLETED | OUTPATIENT
Start: 2023-01-01 | End: 2023-01-01

## 2023-01-01 RX ORDER — INSULIN LISPRO 100 [IU]/ML
5 INJECTION, SOLUTION INTRAVENOUS; SUBCUTANEOUS
Qty: 4.5 ML | Refills: 11
Start: 2023-01-01 | End: 2023-11-20

## 2023-01-01 RX ORDER — CARVEDILOL 3.12 MG/1
6.25 TABLET ORAL
Status: COMPLETED | OUTPATIENT
Start: 2023-01-01 | End: 2023-01-01

## 2023-01-01 RX ORDER — PROPOFOL 10 MG/ML
0-50 INJECTION, EMULSION INTRAVENOUS CONTINUOUS
Status: DISCONTINUED | OUTPATIENT
Start: 2023-01-01 | End: 2023-01-01

## 2023-01-01 RX ORDER — SODIUM CHLORIDE 0.9 % (FLUSH) 0.9 %
10 SYRINGE (ML) INJECTION EVERY 12 HOURS PRN
Status: DISCONTINUED | OUTPATIENT
Start: 2023-01-01 | End: 2023-01-01

## 2023-01-01 RX ORDER — ONDANSETRON HYDROCHLORIDE 8 MG/1
8 TABLET, FILM COATED ORAL 3 TIMES DAILY PRN
Start: 2023-01-01 | End: 2023-11-20

## 2023-01-01 RX ORDER — ENOXAPARIN SODIUM 100 MG/ML
40 INJECTION SUBCUTANEOUS EVERY 24 HOURS
Status: DISCONTINUED | OUTPATIENT
Start: 2023-01-01 | End: 2023-01-01

## 2023-01-01 RX ORDER — HYDROCODONE BITARTRATE AND ACETAMINOPHEN 500; 5 MG/1; MG/1
TABLET ORAL
Status: DISCONTINUED | OUTPATIENT
Start: 2023-01-01 | End: 2023-01-01 | Stop reason: HOSPADM

## 2023-01-01 RX ORDER — NITROGLYCERIN 5 MG/ML
INJECTION, SOLUTION INTRAVENOUS
Status: DISCONTINUED | OUTPATIENT
Start: 2023-01-01 | End: 2023-01-01 | Stop reason: HOSPADM

## 2023-01-01 RX ORDER — FUROSEMIDE 10 MG/ML
80 INJECTION INTRAMUSCULAR; INTRAVENOUS
Status: COMPLETED | OUTPATIENT
Start: 2023-01-01 | End: 2023-01-01

## 2023-01-01 RX ORDER — PROCHLORPERAZINE EDISYLATE 5 MG/ML
5 INJECTION INTRAMUSCULAR; INTRAVENOUS EVERY 6 HOURS PRN
Status: DISCONTINUED | OUTPATIENT
Start: 2023-01-01 | End: 2023-01-01 | Stop reason: HOSPADM

## 2023-01-01 RX ORDER — DEXMEDETOMIDINE HYDROCHLORIDE 4 UG/ML
INJECTION, SOLUTION INTRAVENOUS
Status: DISPENSED
Start: 2023-01-01 | End: 2023-01-01

## 2023-01-01 RX ORDER — INFANT FORMULA WITH IRON
POWDER (GRAM) ORAL 2 TIMES DAILY
Qty: 15 G | Refills: 0 | Status: ON HOLD | OUTPATIENT
Start: 2023-01-01 | End: 2023-01-01 | Stop reason: HOSPADM

## 2023-01-01 RX ORDER — VITAMIN A 3000 MCG
2 CAPSULE ORAL 3 TIMES DAILY
Qty: 104 ML | Refills: 12 | Status: ON HOLD
Start: 2023-01-01 | End: 2023-01-01 | Stop reason: SDUPTHER

## 2023-01-01 RX ORDER — DEXTROSE 20 G/100ML
INJECTION, SOLUTION INTRAVENOUS CONTINUOUS
Status: DISCONTINUED | OUTPATIENT
Start: 2023-01-01 | End: 2023-01-01

## 2023-01-01 RX ORDER — HEPARIN SODIUM 1000 [USP'U]/ML
INJECTION, SOLUTION INTRAVENOUS; SUBCUTANEOUS
Status: DISCONTINUED | OUTPATIENT
Start: 2023-01-01 | End: 2023-01-01 | Stop reason: HOSPADM

## 2023-01-01 RX ORDER — LIDOCAINE 560 MG/1
2 PATCH PERCUTANEOUS; TOPICAL; TRANSDERMAL DAILY
Status: ON HOLD
Start: 2023-01-01 | End: 2023-01-01 | Stop reason: SDUPTHER

## 2023-01-01 RX ORDER — IBUPROFEN 200 MG
24 TABLET ORAL
Status: DISCONTINUED | OUTPATIENT
Start: 2023-01-01 | End: 2023-01-01 | Stop reason: RX

## 2023-01-01 RX ORDER — DEXTROSE 40 %
15 GEL (GRAM) ORAL
Status: DISCONTINUED | OUTPATIENT
Start: 2023-01-01 | End: 2023-01-01

## 2023-01-01 RX ORDER — DEXTROSE 40 %
30 GEL (GRAM) ORAL
Status: DISCONTINUED | OUTPATIENT
Start: 2023-01-01 | End: 2023-01-01

## 2023-01-01 RX ORDER — ONDANSETRON 2 MG/ML
4 INJECTION INTRAMUSCULAR; INTRAVENOUS EVERY 6 HOURS PRN
Status: DISCONTINUED | OUTPATIENT
Start: 2023-01-01 | End: 2023-01-01 | Stop reason: HOSPADM

## 2023-01-01 RX ORDER — SEVELAMER CARBONATE 800 MG/1
800 TABLET, FILM COATED ORAL
Status: DISCONTINUED | OUTPATIENT
Start: 2023-01-01 | End: 2023-01-01

## 2023-01-01 RX ORDER — DICLOFENAC SODIUM 10 MG/G
2 GEL TOPICAL 4 TIMES DAILY PRN
Status: ON HOLD
Start: 2023-01-01 | End: 2023-01-01 | Stop reason: HOSPADM

## 2023-01-01 RX ORDER — INSULIN ASPART 100 [IU]/ML
6 INJECTION, SOLUTION INTRAVENOUS; SUBCUTANEOUS
Status: DISCONTINUED | OUTPATIENT
Start: 2023-01-01 | End: 2023-01-01 | Stop reason: HOSPADM

## 2023-01-01 RX ORDER — HYDRALAZINE HYDROCHLORIDE 20 MG/ML
10 INJECTION INTRAMUSCULAR; INTRAVENOUS
Status: COMPLETED | OUTPATIENT
Start: 2023-01-01 | End: 2023-01-01

## 2023-01-01 RX ORDER — DEXTROSE 20 G/100ML
INJECTION, SOLUTION INTRAVENOUS CONTINUOUS
Status: CANCELLED | OUTPATIENT
Start: 2023-01-01

## 2023-01-01 RX ORDER — IBUPROFEN 200 MG
16 TABLET ORAL
Status: DISCONTINUED | OUTPATIENT
Start: 2023-01-01 | End: 2023-01-01 | Stop reason: RX

## 2023-01-01 RX ORDER — IPRATROPIUM BROMIDE AND ALBUTEROL SULFATE 2.5; .5 MG/3ML; MG/3ML
3 SOLUTION RESPIRATORY (INHALATION)
Qty: 6480 ML | Refills: 0
Start: 2023-01-01 | End: 2023-01-01

## 2023-01-01 RX ORDER — ATORVASTATIN CALCIUM 40 MG/1
40 TABLET, FILM COATED ORAL NIGHTLY
Status: DISCONTINUED | OUTPATIENT
Start: 2023-01-01 | End: 2023-01-01 | Stop reason: HOSPADM

## 2023-01-01 RX ORDER — DROPERIDOL 2.5 MG/ML
1.25 INJECTION, SOLUTION INTRAMUSCULAR; INTRAVENOUS ONCE
Status: COMPLETED | OUTPATIENT
Start: 2023-01-01 | End: 2023-01-01

## 2023-01-01 RX ORDER — HYDRALAZINE HYDROCHLORIDE 20 MG/ML
5 INJECTION INTRAMUSCULAR; INTRAVENOUS ONCE
Status: CANCELLED | OUTPATIENT
Start: 2023-01-01

## 2023-01-01 RX ORDER — CETIRIZINE HYDROCHLORIDE 10 MG/1
10 TABLET ORAL DAILY PRN
Status: DISCONTINUED | OUTPATIENT
Start: 2023-01-01 | End: 2023-01-01 | Stop reason: HOSPADM

## 2023-01-01 RX ORDER — CEFAZOLIN SODIUM 1 G/3ML
INJECTION, POWDER, FOR SOLUTION INTRAMUSCULAR; INTRAVENOUS
Status: DISCONTINUED | OUTPATIENT
Start: 2023-01-01 | End: 2023-01-01

## 2023-01-01 RX ORDER — INSULIN ASPART 100 [IU]/ML
2 INJECTION, SOLUTION INTRAVENOUS; SUBCUTANEOUS ONCE
Status: COMPLETED | OUTPATIENT
Start: 2023-01-01 | End: 2023-01-01

## 2023-01-01 RX ORDER — CLONIDINE 0.2 MG/24H
1 PATCH, EXTENDED RELEASE TRANSDERMAL
Status: DISCONTINUED | OUTPATIENT
Start: 2023-01-01 | End: 2023-01-01 | Stop reason: HOSPADM

## 2023-01-01 RX ORDER — LISINOPRIL 40 MG/1
40 TABLET ORAL NIGHTLY
Qty: 90 TABLET | Refills: 3 | Status: ON HOLD | OUTPATIENT
Start: 2023-01-01 | End: 2023-01-01 | Stop reason: SDUPTHER

## 2023-01-01 RX ORDER — CALCIUM ACETATE 667 MG/1
667 CAPSULE ORAL 3 TIMES DAILY
Status: ON HOLD | COMMUNITY
Start: 2023-01-01 | End: 2023-01-01 | Stop reason: HOSPADM

## 2023-01-01 RX ORDER — OXYMETAZOLINE HCL 0.05 %
1 SPRAY, NON-AEROSOL (ML) NASAL
Status: COMPLETED | OUTPATIENT
Start: 2023-01-01 | End: 2023-01-01

## 2023-01-01 RX ORDER — LABETALOL HYDROCHLORIDE 5 MG/ML
20 INJECTION, SOLUTION INTRAVENOUS
Status: DISCONTINUED | OUTPATIENT
Start: 2023-01-01 | End: 2023-01-01

## 2023-01-01 RX ORDER — CEFAZOLIN SODIUM 1 G/50ML
SOLUTION INTRAVENOUS
Status: DISCONTINUED | OUTPATIENT
Start: 2023-01-01 | End: 2023-01-01 | Stop reason: HOSPADM

## 2023-01-01 RX ORDER — FENTANYL CITRATE 50 UG/ML
INJECTION, SOLUTION INTRAMUSCULAR; INTRAVENOUS
Status: DISCONTINUED | OUTPATIENT
Start: 2023-01-01 | End: 2023-01-01

## 2023-01-01 RX ORDER — LANOLIN ALCOHOL/MO/W.PET/CERES
800 CREAM (GRAM) TOPICAL
Status: DISCONTINUED | OUTPATIENT
Start: 2023-01-01 | End: 2023-01-01 | Stop reason: HOSPADM

## 2023-01-01 RX ORDER — METHYLPREDNISOLONE SOD SUCC 125 MG
125 VIAL (EA) INJECTION
Status: COMPLETED | OUTPATIENT
Start: 2023-01-01 | End: 2023-01-01

## 2023-01-01 RX ORDER — SEVELAMER CARBONATE 800 MG/1
800 TABLET, FILM COATED ORAL 3 TIMES DAILY
Status: DISCONTINUED | OUTPATIENT
Start: 2023-01-01 | End: 2023-01-01

## 2023-01-01 RX ORDER — SEVELAMER CARBONATE 800 MG/1
800 TABLET, FILM COATED ORAL
Start: 2023-01-01 | End: 2023-11-20

## 2023-01-01 RX ORDER — INSULIN ASPART 100 [IU]/ML
0-5 INJECTION, SOLUTION INTRAVENOUS; SUBCUTANEOUS EVERY 4 HOURS PRN
Status: DISCONTINUED | OUTPATIENT
Start: 2023-01-01 | End: 2023-01-01

## 2023-01-01 RX ORDER — DEXTROSE MONOHYDRATE AND SODIUM CHLORIDE 5; .45 G/100ML; G/100ML
INJECTION, SOLUTION INTRAVENOUS CONTINUOUS PRN
Status: DISCONTINUED | OUTPATIENT
Start: 2023-01-01 | End: 2023-01-01 | Stop reason: HOSPADM

## 2023-01-01 RX ORDER — INSULIN ASPART 100 [IU]/ML
8 INJECTION, SOLUTION INTRAVENOUS; SUBCUTANEOUS
Status: DISCONTINUED | OUTPATIENT
Start: 2023-01-01 | End: 2023-01-01

## 2023-01-01 RX ORDER — NOREPINEPHRINE BITARTRATE/D5W 8 MG/250ML
0-3 PLASTIC BAG, INJECTION (ML) INTRAVENOUS CONTINUOUS
Status: DISCONTINUED | OUTPATIENT
Start: 2023-01-01 | End: 2023-01-01

## 2023-01-01 RX ORDER — IPRATROPIUM BROMIDE AND ALBUTEROL SULFATE 2.5; .5 MG/3ML; MG/3ML
3 SOLUTION RESPIRATORY (INHALATION) EVERY 4 HOURS
Status: DISCONTINUED | OUTPATIENT
Start: 2023-01-01 | End: 2023-01-01

## 2023-01-01 RX ORDER — ETOMIDATE 2 MG/ML
INJECTION INTRAVENOUS
Status: DISCONTINUED
Start: 2023-01-01 | End: 2023-01-01 | Stop reason: WASHOUT

## 2023-01-01 RX ORDER — LACTOSE-REDUCED FOOD
1 LIQUID (ML) ORAL DAILY
Start: 2023-01-01 | End: 2023-11-20

## 2023-01-01 RX ORDER — CALCIUM GLUCONATE 20 MG/ML
1 INJECTION, SOLUTION INTRAVENOUS
Status: COMPLETED | OUTPATIENT
Start: 2023-01-01 | End: 2023-01-01

## 2023-01-01 RX ORDER — AMOXICILLIN 250 MG
1 CAPSULE ORAL DAILY PRN
Status: DISCONTINUED | OUTPATIENT
Start: 2023-01-01 | End: 2023-01-01 | Stop reason: HOSPADM

## 2023-01-01 RX ORDER — NAPROXEN SODIUM 220 MG/1
81 TABLET, FILM COATED ORAL DAILY
Status: DISCONTINUED | OUTPATIENT
Start: 2023-01-01 | End: 2023-01-01 | Stop reason: HOSPADM

## 2023-01-01 RX ORDER — NOREPINEPHRINE BITARTRATE/D5W 4MG/250ML
PLASTIC BAG, INJECTION (ML) INTRAVENOUS
Status: COMPLETED
Start: 2023-01-01 | End: 2023-01-01

## 2023-01-01 RX ORDER — HYDRALAZINE HYDROCHLORIDE 20 MG/ML
10 INJECTION INTRAMUSCULAR; INTRAVENOUS
Status: DISCONTINUED | OUTPATIENT
Start: 2023-01-01 | End: 2023-01-01

## 2023-01-01 RX ORDER — DEXTROSE 50 % IN WATER (D50W) INTRAVENOUS SYRINGE
25 ONCE
Status: DISCONTINUED | OUTPATIENT
Start: 2023-01-01 | End: 2023-01-01

## 2023-01-01 RX ORDER — PETROLATUM,WHITE
OINTMENT IN PACKET (GRAM) TOPICAL 2 TIMES DAILY
Qty: 5 G | Refills: 0 | Status: ON HOLD | OUTPATIENT
Start: 2023-01-01 | End: 2023-01-01 | Stop reason: SDUPTHER

## 2023-01-01 RX ORDER — NIFEDIPINE 30 MG/1
60 TABLET, EXTENDED RELEASE ORAL 2 TIMES DAILY
Status: DISCONTINUED | OUTPATIENT
Start: 2023-01-01 | End: 2023-01-01

## 2023-01-01 RX ORDER — PROPOFOL 10 MG/ML
INJECTION, EMULSION INTRAVENOUS
Status: COMPLETED
Start: 2023-01-01 | End: 2023-01-01

## 2023-01-01 RX ORDER — INSULIN ASPART 100 [IU]/ML
1-10 INJECTION, SOLUTION INTRAVENOUS; SUBCUTANEOUS EVERY 6 HOURS PRN
Status: DISCONTINUED | OUTPATIENT
Start: 2023-01-01 | End: 2023-01-01

## 2023-01-01 RX ORDER — SODIUM CHLORIDE 9 MG/ML
1000 INJECTION, SOLUTION INTRAVENOUS CONTINUOUS
Status: DISCONTINUED | OUTPATIENT
Start: 2023-01-01 | End: 2023-01-01

## 2023-01-01 RX ORDER — INSULIN ASPART 100 [IU]/ML
1-10 INJECTION, SOLUTION INTRAVENOUS; SUBCUTANEOUS EVERY 4 HOURS PRN
Status: DISCONTINUED | OUTPATIENT
Start: 2023-01-01 | End: 2023-01-01

## 2023-01-01 RX ORDER — LIDOCAINE 560 MG/1
2 PATCH PERCUTANEOUS; TOPICAL; TRANSDERMAL DAILY
Start: 2023-01-01 | End: 2023-11-20

## 2023-01-01 RX ORDER — ERYTHROMYCIN 5 MG/G
OINTMENT OPHTHALMIC
Qty: 3.5 G | Refills: 0 | Status: ON HOLD | OUTPATIENT
Start: 2023-01-01 | End: 2023-01-01 | Stop reason: HOSPADM

## 2023-01-01 RX ORDER — CARVEDILOL 12.5 MG/1
12.5 TABLET ORAL 2 TIMES DAILY
Status: DISCONTINUED | OUTPATIENT
Start: 2023-01-01 | End: 2023-01-01

## 2023-01-01 RX ORDER — ALBUTEROL SULFATE 90 UG/1
2 AEROSOL, METERED RESPIRATORY (INHALATION) EVERY 6 HOURS
Status: DISCONTINUED | OUTPATIENT
Start: 2023-01-01 | End: 2023-01-01

## 2023-01-01 RX ORDER — TALC
6 POWDER (GRAM) TOPICAL NIGHTLY PRN
Status: DISCONTINUED | OUTPATIENT
Start: 2023-01-01 | End: 2023-01-01 | Stop reason: SDUPTHER

## 2023-01-01 RX ORDER — DEXTROSE MONOHYDRATE AND SODIUM CHLORIDE 5; .45 G/100ML; G/100ML
INJECTION, SOLUTION INTRAVENOUS CONTINUOUS PRN
Status: DISCONTINUED | OUTPATIENT
Start: 2023-01-01 | End: 2023-01-01

## 2023-01-01 RX ORDER — OXYMETAZOLINE HCL 0.05 %
2 SPRAY, NON-AEROSOL (ML) NASAL
Status: COMPLETED | OUTPATIENT
Start: 2023-01-01 | End: 2023-01-01

## 2023-01-01 RX ORDER — FUROSEMIDE 10 MG/ML
120 INJECTION INTRAMUSCULAR; INTRAVENOUS ONCE
Status: COMPLETED | OUTPATIENT
Start: 2023-01-01 | End: 2023-01-01

## 2023-01-01 RX ORDER — ROCURONIUM BROMIDE 10 MG/ML
70 INJECTION, SOLUTION INTRAVENOUS ONCE
Status: DISCONTINUED | OUTPATIENT
Start: 2023-01-01 | End: 2023-01-01

## 2023-01-01 RX ORDER — PROPOFOL 10 MG/ML
VIAL (ML) INTRAVENOUS
Status: COMPLETED
Start: 2023-01-01 | End: 2023-01-01

## 2023-01-01 RX ORDER — ISOSORBIDE MONONITRATE 30 MG/1
30 TABLET, EXTENDED RELEASE ORAL
Status: COMPLETED | OUTPATIENT
Start: 2023-01-01 | End: 2023-01-01

## 2023-01-01 RX ORDER — SCOLOPAMINE TRANSDERMAL SYSTEM 1 MG/1
1 PATCH, EXTENDED RELEASE TRANSDERMAL
Status: DISCONTINUED | OUTPATIENT
Start: 2023-01-01 | End: 2023-01-01 | Stop reason: HOSPADM

## 2023-01-01 RX ORDER — ENOXAPARIN SODIUM 100 MG/ML
30 INJECTION SUBCUTANEOUS EVERY 24 HOURS
Status: DISCONTINUED | OUTPATIENT
Start: 2023-01-01 | End: 2023-01-01

## 2023-01-01 RX ORDER — NIFEDIPINE 30 MG/1
30 TABLET, EXTENDED RELEASE ORAL ONCE
Status: DISCONTINUED | OUTPATIENT
Start: 2023-01-01 | End: 2023-01-01

## 2023-01-01 RX ORDER — CETIRIZINE HYDROCHLORIDE 5 MG/1
10 TABLET ORAL DAILY PRN
Status: DISCONTINUED | OUTPATIENT
Start: 2023-01-01 | End: 2023-01-01

## 2023-01-01 RX ORDER — HYDRALAZINE HYDROCHLORIDE 25 MG/1
100 TABLET, FILM COATED ORAL
Status: COMPLETED | OUTPATIENT
Start: 2023-01-01 | End: 2023-01-01

## 2023-01-01 RX ORDER — SODIUM CHLORIDE 9 MG/ML
125 INJECTION, SOLUTION INTRAVENOUS CONTINUOUS
Status: DISCONTINUED | OUTPATIENT
Start: 2023-01-01 | End: 2023-01-01

## 2023-01-01 RX ORDER — DOXYCYCLINE 100 MG/1
100 CAPSULE ORAL 2 TIMES DAILY
Status: ON HOLD | COMMUNITY
Start: 2023-01-01 | End: 2023-01-01 | Stop reason: HOSPADM

## 2023-01-01 RX ORDER — NIFEDIPINE 30 MG/1
30 TABLET, EXTENDED RELEASE ORAL DAILY
Status: DISCONTINUED | OUTPATIENT
Start: 2023-01-01 | End: 2023-01-01

## 2023-01-01 RX ORDER — MORPHINE SULFATE 2 MG/ML
2 INJECTION, SOLUTION INTRAMUSCULAR; INTRAVENOUS EVERY 4 HOURS PRN
Status: DISCONTINUED | OUTPATIENT
Start: 2023-01-01 | End: 2023-01-01 | Stop reason: HOSPADM

## 2023-01-01 RX ORDER — TALC
3 POWDER (GRAM) TOPICAL NIGHTLY PRN
Status: DISCONTINUED | OUTPATIENT
Start: 2023-01-01 | End: 2023-01-01 | Stop reason: HOSPADM

## 2023-01-01 RX ORDER — LABETALOL HCL 20 MG/4 ML
20 SYRINGE (ML) INTRAVENOUS ONCE
Status: DISCONTINUED | OUTPATIENT
Start: 2023-01-01 | End: 2023-01-01

## 2023-01-01 RX ORDER — MIDAZOLAM HYDROCHLORIDE 1 MG/ML
INJECTION INTRAMUSCULAR; INTRAVENOUS
Status: DISCONTINUED | OUTPATIENT
Start: 2023-01-01 | End: 2023-01-01 | Stop reason: HOSPADM

## 2023-01-01 RX ORDER — HYDRALAZINE HYDROCHLORIDE 20 MG/ML
10 INJECTION INTRAMUSCULAR; INTRAVENOUS ONCE
Status: DISCONTINUED | OUTPATIENT
Start: 2023-01-01 | End: 2023-01-01 | Stop reason: HOSPADM

## 2023-01-01 RX ORDER — OXYMETAZOLINE HCL 0.05 %
3 SPRAY, NON-AEROSOL (ML) NASAL EVERY 30 MIN PRN
Refills: 0 | Status: ON HOLD
Start: 2023-01-01 | End: 2023-01-01 | Stop reason: HOSPADM

## 2023-01-01 RX ORDER — ALBUTEROL SULFATE 90 UG/1
2 AEROSOL, METERED RESPIRATORY (INHALATION) 4 TIMES DAILY PRN
Qty: 18 G | Refills: 0
Start: 2023-01-01 | End: 2023-11-20

## 2023-01-01 RX ORDER — CARVEDILOL 12.5 MG/1
12.5 TABLET ORAL 2 TIMES DAILY
Qty: 60 TABLET | Refills: 11
Start: 2023-01-01 | End: 2023-11-20

## 2023-01-01 RX ORDER — NIFEDIPINE 60 MG/1
60 TABLET, EXTENDED RELEASE ORAL 2 TIMES DAILY
Qty: 60 TABLET | Refills: 11
Start: 2023-01-01 | End: 2023-11-20

## 2023-01-01 RX ORDER — OXYMETAZOLINE HCL 0.05 %
2 SPRAY, NON-AEROSOL (ML) NASAL EVERY 8 HOURS
Status: COMPLETED | OUTPATIENT
Start: 2023-01-01 | End: 2023-01-01

## 2023-01-01 RX ORDER — AMOXICILLIN AND CLAVULANATE POTASSIUM 500; 125 MG/1; MG/1
1 TABLET, FILM COATED ORAL EVERY 12 HOURS
Status: COMPLETED | OUTPATIENT
Start: 2023-01-01 | End: 2023-01-01

## 2023-01-01 RX ORDER — PROPARACAINE HYDROCHLORIDE 5 MG/ML
1 SOLUTION/ DROPS OPHTHALMIC
Status: COMPLETED | OUTPATIENT
Start: 2023-01-01 | End: 2023-01-01

## 2023-01-01 RX ORDER — CARVEDILOL 25 MG/1
25 TABLET ORAL 2 TIMES DAILY
Status: DISCONTINUED | OUTPATIENT
Start: 2023-01-01 | End: 2023-01-01

## 2023-01-01 RX ORDER — INSULIN ASPART 100 [IU]/ML
7 INJECTION, SOLUTION INTRAVENOUS; SUBCUTANEOUS
Status: DISCONTINUED | OUTPATIENT
Start: 2023-01-01 | End: 2023-01-01

## 2023-01-01 RX ORDER — LISINOPRIL 20 MG/1
40 TABLET ORAL NIGHTLY
Status: DISCONTINUED | OUTPATIENT
Start: 2023-01-01 | End: 2023-01-01

## 2023-01-01 RX ORDER — DEXTROSE MONOHYDRATE AND SODIUM CHLORIDE 5; .45 G/100ML; G/100ML
125 INJECTION, SOLUTION INTRAVENOUS CONTINUOUS PRN
Status: DISCONTINUED | OUTPATIENT
Start: 2023-01-01 | End: 2023-01-01

## 2023-01-01 RX ORDER — INSULIN LISPRO 100 [IU]/ML
5 INJECTION, SOLUTION INTRAVENOUS; SUBCUTANEOUS
Qty: 4.5 ML | Refills: 11 | Status: ON HOLD | OUTPATIENT
Start: 2023-01-01 | End: 2023-01-01 | Stop reason: SDUPTHER

## 2023-01-01 RX ORDER — CARVEDILOL 3.12 MG/1
3.12 TABLET ORAL 2 TIMES DAILY
Status: DISCONTINUED | OUTPATIENT
Start: 2023-01-01 | End: 2023-01-01 | Stop reason: HOSPADM

## 2023-01-01 RX ORDER — FAMOTIDINE 20 MG/1
20 TABLET, FILM COATED ORAL DAILY
Status: DISCONTINUED | OUTPATIENT
Start: 2023-01-01 | End: 2023-01-01

## 2023-01-01 RX ORDER — FLUOXETINE HYDROCHLORIDE 40 MG/1
40 CAPSULE ORAL DAILY
Start: 2023-01-01 | End: 2023-11-20

## 2023-01-01 RX ORDER — POTASSIUM CHLORIDE 7.45 MG/ML
50 INJECTION INTRAVENOUS
Status: DISCONTINUED | OUTPATIENT
Start: 2023-01-01 | End: 2023-01-01

## 2023-01-01 RX ORDER — SEVELAMER CARBONATE 800 MG/1
800 TABLET, FILM COATED ORAL
Status: ON HOLD
Start: 2023-01-01 | End: 2023-01-01 | Stop reason: SDUPTHER

## 2023-01-01 RX ORDER — CETIRIZINE HYDROCHLORIDE 5 MG/1
5 TABLET ORAL DAILY PRN
Status: DISCONTINUED | OUTPATIENT
Start: 2023-01-01 | End: 2023-01-01 | Stop reason: HOSPADM

## 2023-01-01 RX ORDER — HALOPERIDOL 5 MG/ML
INJECTION INTRAMUSCULAR
Status: COMPLETED
Start: 2023-01-01 | End: 2023-01-01

## 2023-01-01 RX ORDER — CLONIDINE HYDROCHLORIDE 0.1 MG/1
0.1 TABLET ORAL ONCE
Status: DISCONTINUED | OUTPATIENT
Start: 2023-01-01 | End: 2023-01-01

## 2023-01-01 RX ORDER — FENTANYL CITRATE-0.9 % NACL/PF 10 MCG/ML
0-200 PLASTIC BAG, INJECTION (ML) INTRAVENOUS CONTINUOUS
Status: DISCONTINUED | OUTPATIENT
Start: 2023-01-01 | End: 2023-01-01

## 2023-01-01 RX ORDER — INSULIN LISPRO 100 [IU]/ML
8 INJECTION, SOLUTION INTRAVENOUS; SUBCUTANEOUS
Qty: 6 ML | Refills: 11 | Status: ON HOLD | OUTPATIENT
Start: 2023-01-01 | End: 2023-01-01 | Stop reason: HOSPADM

## 2023-01-01 RX ORDER — ISOSORBIDE MONONITRATE 30 MG/1
30 TABLET, EXTENDED RELEASE ORAL DAILY
Qty: 90 TABLET | Refills: 3 | Status: ON HOLD | OUTPATIENT
Start: 2023-01-01 | End: 2023-01-01 | Stop reason: SDUPTHER

## 2023-01-01 RX ORDER — PROPOFOL 10 MG/ML
100 VIAL (ML) INTRAVENOUS ONCE
Status: COMPLETED | OUTPATIENT
Start: 2023-01-01 | End: 2023-01-01

## 2023-01-01 RX ORDER — LABETALOL HCL 20 MG/4 ML
20 SYRINGE (ML) INTRAVENOUS ONCE
Status: COMPLETED | OUTPATIENT
Start: 2023-01-01 | End: 2023-01-01

## 2023-01-01 RX ORDER — LISINOPRIL 20 MG/1
20 TABLET ORAL ONCE
Status: COMPLETED | OUTPATIENT
Start: 2023-01-01 | End: 2023-01-01

## 2023-01-01 RX ORDER — CARVEDILOL 12.5 MG/1
12.5 TABLET ORAL 2 TIMES DAILY WITH MEALS
Status: DISCONTINUED | OUTPATIENT
Start: 2023-01-01 | End: 2023-01-01 | Stop reason: HOSPADM

## 2023-01-01 RX ORDER — FLUTICASONE FUROATE AND VILANTEROL 100; 25 UG/1; UG/1
1 POWDER RESPIRATORY (INHALATION) DAILY
Status: DISCONTINUED | OUTPATIENT
Start: 2023-01-01 | End: 2023-01-01

## 2023-01-01 RX ORDER — CARVEDILOL 3.12 MG/1
6.25 TABLET ORAL 2 TIMES DAILY
Status: DISCONTINUED | OUTPATIENT
Start: 2023-01-01 | End: 2023-01-01

## 2023-01-01 RX ADMIN — CEFTRIAXONE 1 G: 1 INJECTION, POWDER, FOR SOLUTION INTRAMUSCULAR; INTRAVENOUS at 04:06

## 2023-01-01 RX ADMIN — INSULIN ASPART 5 UNITS: 100 INJECTION, SOLUTION INTRAVENOUS; SUBCUTANEOUS at 12:03

## 2023-01-01 RX ADMIN — HYDRALAZINE HYDROCHLORIDE 25 MG: 25 TABLET, FILM COATED ORAL at 02:08

## 2023-01-01 RX ADMIN — ATORVASTATIN CALCIUM 40 MG: 40 TABLET, FILM COATED ORAL at 09:03

## 2023-01-01 RX ADMIN — SEVELAMER CARBONATE 800 MG: 800 TABLET, FILM COATED ORAL at 09:06

## 2023-01-01 RX ADMIN — HYDRALAZINE HYDROCHLORIDE 10 MG: 20 INJECTION, SOLUTION INTRAMUSCULAR; INTRAVENOUS at 11:10

## 2023-01-01 RX ADMIN — OXYMETAZOLINE HYDROCHLORIDE 2 SPRAY: 0.05 SPRAY NASAL at 12:03

## 2023-01-01 RX ADMIN — INSULIN ASPART 1 UNITS: 100 INJECTION, SOLUTION INTRAVENOUS; SUBCUTANEOUS at 11:10

## 2023-01-01 RX ADMIN — HYDRALAZINE HYDROCHLORIDE 10 MG: 20 INJECTION, SOLUTION INTRAMUSCULAR; INTRAVENOUS at 06:10

## 2023-01-01 RX ADMIN — IPRATROPIUM BROMIDE AND ALBUTEROL SULFATE 3 ML: .5; 3 SOLUTION RESPIRATORY (INHALATION) at 04:10

## 2023-01-01 RX ADMIN — ATORVASTATIN CALCIUM 40 MG: 40 TABLET, FILM COATED ORAL at 12:06

## 2023-01-01 RX ADMIN — ISOSORBIDE MONONITRATE 30 MG: 30 TABLET, EXTENDED RELEASE ORAL at 08:08

## 2023-01-01 RX ADMIN — DEXTROSE MONOHYDRATE 250 ML: 100 INJECTION, SOLUTION INTRAVENOUS at 04:10

## 2023-01-01 RX ADMIN — ASPIRIN 81 MG: 81 TABLET, COATED ORAL at 08:03

## 2023-01-01 RX ADMIN — ISOSORBIDE MONONITRATE 30 MG: 30 TABLET, EXTENDED RELEASE ORAL at 12:06

## 2023-01-01 RX ADMIN — INSULIN DETEMIR 6 UNITS: 100 INJECTION, SOLUTION SUBCUTANEOUS at 08:06

## 2023-01-01 RX ADMIN — HYDRALAZINE HYDROCHLORIDE 5 MG: 20 INJECTION, SOLUTION INTRAMUSCULAR; INTRAVENOUS at 10:06

## 2023-01-01 RX ADMIN — NIFEDIPINE 60 MG: 30 TABLET, FILM COATED, EXTENDED RELEASE ORAL at 08:10

## 2023-01-01 RX ADMIN — MUPIROCIN: 20 OINTMENT TOPICAL at 08:04

## 2023-01-01 RX ADMIN — LISINOPRIL 40 MG: 20 TABLET ORAL at 09:03

## 2023-01-01 RX ADMIN — Medication 10 MG: at 01:08

## 2023-01-01 RX ADMIN — IPRATROPIUM BROMIDE AND ALBUTEROL SULFATE 3 ML: .5; 3 SOLUTION RESPIRATORY (INHALATION) at 08:10

## 2023-01-01 RX ADMIN — ISOSORBIDE MONONITRATE 30 MG: 30 TABLET, EXTENDED RELEASE ORAL at 08:03

## 2023-01-01 RX ADMIN — INSULIN ASPART 8 UNITS: 100 INJECTION, SOLUTION INTRAVENOUS; SUBCUTANEOUS at 08:03

## 2023-01-01 RX ADMIN — INSULIN ASPART 4 UNITS: 100 INJECTION, SOLUTION INTRAVENOUS; SUBCUTANEOUS at 09:10

## 2023-01-01 RX ADMIN — CARVEDILOL 12.5 MG: 12.5 TABLET, FILM COATED ORAL at 09:10

## 2023-01-01 RX ADMIN — NASAL 1 SPRAY: 6.5 SPRAY NASAL at 06:08

## 2023-01-01 RX ADMIN — ATORVASTATIN CALCIUM 40 MG: 40 TABLET, FILM COATED ORAL at 08:10

## 2023-01-01 RX ADMIN — HEPARIN SODIUM 5000 UNITS: 5000 INJECTION INTRAVENOUS; SUBCUTANEOUS at 11:10

## 2023-01-01 RX ADMIN — MUPIROCIN: 20 OINTMENT TOPICAL at 09:10

## 2023-01-01 RX ADMIN — NIFEDIPINE 60 MG: 30 TABLET, FILM COATED, EXTENDED RELEASE ORAL at 08:06

## 2023-01-01 RX ADMIN — CARVEDILOL 6.25 MG: 6.25 TABLET, FILM COATED ORAL at 01:03

## 2023-01-01 RX ADMIN — IPRATROPIUM BROMIDE AND ALBUTEROL SULFATE 3 ML: .5; 3 SOLUTION RESPIRATORY (INHALATION) at 12:10

## 2023-01-01 RX ADMIN — SEVELAMER CARBONATE 800 MG: 800 TABLET, FILM COATED ORAL at 10:08

## 2023-01-01 RX ADMIN — HYDROCORTISONE SODIUM SUCCINATE 100 MG: 100 INJECTION, POWDER, FOR SOLUTION INTRAMUSCULAR; INTRAVENOUS at 11:10

## 2023-01-01 RX ADMIN — TIOTROPIUM BROMIDE INHALATION SPRAY 2 PUFF: 3.12 SPRAY, METERED RESPIRATORY (INHALATION) at 08:03

## 2023-01-01 RX ADMIN — LISINOPRIL 20 MG: 20 TABLET ORAL at 10:03

## 2023-01-01 RX ADMIN — INSULIN ASPART 5 UNITS: 100 INJECTION, SOLUTION INTRAVENOUS; SUBCUTANEOUS at 01:04

## 2023-01-01 RX ADMIN — OXYMETAZOLINE HYDROCHLORIDE 2 SPRAY: 0.05 SPRAY NASAL at 09:08

## 2023-01-01 RX ADMIN — APIXABAN 5 MG: 5 TABLET, FILM COATED ORAL at 12:06

## 2023-01-01 RX ADMIN — CARVEDILOL 6.25 MG: 6.25 TABLET, FILM COATED ORAL at 08:03

## 2023-01-01 RX ADMIN — NIFEDIPINE 60 MG: 30 TABLET, FILM COATED, EXTENDED RELEASE ORAL at 07:10

## 2023-01-01 RX ADMIN — CARVEDILOL 12.5 MG: 12.5 TABLET, FILM COATED ORAL at 06:10

## 2023-01-01 RX ADMIN — Medication: at 09:03

## 2023-01-01 RX ADMIN — APIXABAN 5 MG: 5 TABLET, FILM COATED ORAL at 09:03

## 2023-01-01 RX ADMIN — MUPIROCIN: 20 OINTMENT TOPICAL at 09:03

## 2023-01-01 RX ADMIN — PIPERACILLIN SODIUM AND TAZOBACTAM SODIUM 4.5 G: 4; .5 INJECTION, POWDER, FOR SOLUTION INTRAVENOUS at 11:10

## 2023-01-01 RX ADMIN — IPRATROPIUM BROMIDE AND ALBUTEROL SULFATE 3 ML: .5; 3 SOLUTION RESPIRATORY (INHALATION) at 11:10

## 2023-01-01 RX ADMIN — CARVEDILOL 12.5 MG: 12.5 TABLET, FILM COATED ORAL at 08:10

## 2023-01-01 RX ADMIN — INSULIN ASPART 5 UNITS: 100 INJECTION, SOLUTION INTRAVENOUS; SUBCUTANEOUS at 08:03

## 2023-01-01 RX ADMIN — ATORVASTATIN CALCIUM 40 MG: 40 TABLET, FILM COATED ORAL at 10:03

## 2023-01-01 RX ADMIN — DEXTROSE MONOHYDRATE 12.5 G: 25 INJECTION, SOLUTION INTRAVENOUS at 02:07

## 2023-01-01 RX ADMIN — ATORVASTATIN CALCIUM 40 MG: 40 TABLET, FILM COATED ORAL at 09:10

## 2023-01-01 RX ADMIN — NIFEDIPINE 60 MG: 60 TABLET, FILM COATED, EXTENDED RELEASE ORAL at 10:09

## 2023-01-01 RX ADMIN — APIXABAN 5 MG: 5 TABLET, FILM COATED ORAL at 10:10

## 2023-01-01 RX ADMIN — ERYTHROMYCIN: 5 OINTMENT OPHTHALMIC at 06:06

## 2023-01-01 RX ADMIN — TIOTROPIUM BROMIDE INHALATION SPRAY 2 PUFF: 3.12 SPRAY, METERED RESPIRATORY (INHALATION) at 08:10

## 2023-01-01 RX ADMIN — HYPROMELLOSE 2910 1 DROP: 5 SOLUTION/ DROPS OPHTHALMIC at 09:11

## 2023-01-01 RX ADMIN — INSULIN DETEMIR 8 UNITS: 100 INJECTION, SOLUTION SUBCUTANEOUS at 08:03

## 2023-01-01 RX ADMIN — FLUOXETINE 40 MG: 20 CAPSULE ORAL at 08:03

## 2023-01-01 RX ADMIN — FLUTICASONE PROPIONATE AND SALMETEROL 1 PUFF: 50; 250 POWDER RESPIRATORY (INHALATION) at 08:02

## 2023-01-01 RX ADMIN — SEVELAMER CARBONATE 800 MG: 800 TABLET, FILM COATED ORAL at 08:08

## 2023-01-01 RX ADMIN — INSULIN ASPART 4 UNITS: 100 INJECTION, SOLUTION INTRAVENOUS; SUBCUTANEOUS at 04:10

## 2023-01-01 RX ADMIN — NOREPINEPHRINE BITARTRATE 0.12 MCG/KG/MIN: 8 INJECTION, SOLUTION INTRAVENOUS at 03:11

## 2023-01-01 RX ADMIN — HEPARIN SODIUM 2000 UNITS: 1000 INJECTION, SOLUTION INTRAVENOUS; SUBCUTANEOUS at 01:08

## 2023-01-01 RX ADMIN — NIFEDIPINE 60 MG: 30 TABLET, FILM COATED, EXTENDED RELEASE ORAL at 09:06

## 2023-01-01 RX ADMIN — TIOTROPIUM BROMIDE INHALATION SPRAY 2 PUFF: 3.12 SPRAY, METERED RESPIRATORY (INHALATION) at 07:08

## 2023-01-01 RX ADMIN — NASAL 1 SPRAY: 6.5 SPRAY NASAL at 01:08

## 2023-01-01 RX ADMIN — ASPIRIN 81 MG: 81 TABLET, COATED ORAL at 09:03

## 2023-01-01 RX ADMIN — HYPROMELLOSE 2910 1 DROP: 5 SOLUTION/ DROPS OPHTHALMIC at 02:11

## 2023-01-01 RX ADMIN — WHITE PETROLATUM: 1.75 OINTMENT TOPICAL at 08:08

## 2023-01-01 RX ADMIN — FLUTICASONE FUROATE AND VILANTEROL TRIFENATATE 1 PUFF: 100; 25 POWDER RESPIRATORY (INHALATION) at 08:03

## 2023-01-01 RX ADMIN — HYDROCORTISONE SODIUM SUCCINATE 100 MG: 100 INJECTION, POWDER, FOR SOLUTION INTRAMUSCULAR; INTRAVENOUS at 02:11

## 2023-01-01 RX ADMIN — CARVEDILOL 6.25 MG: 6.25 TABLET, FILM COATED ORAL at 09:03

## 2023-01-01 RX ADMIN — MUPIROCIN: 20 OINTMENT TOPICAL at 10:03

## 2023-01-01 RX ADMIN — FLUTICASONE FUROATE AND VILANTEROL TRIFENATATE 1 PUFF: 100; 25 POWDER RESPIRATORY (INHALATION) at 02:09

## 2023-01-01 RX ADMIN — NIFEDIPINE 90 MG: 30 TABLET, FILM COATED, EXTENDED RELEASE ORAL at 09:03

## 2023-01-01 RX ADMIN — CARVEDILOL 6.25 MG: 3.12 TABLET, FILM COATED ORAL at 04:02

## 2023-01-01 RX ADMIN — FLUTICASONE FUROATE AND VILANTEROL TRIFENATATE 1 PUFF: 100; 25 POWDER RESPIRATORY (INHALATION) at 12:08

## 2023-01-01 RX ADMIN — HYDRALAZINE HYDROCHLORIDE 100 MG: 50 TABLET ORAL at 09:03

## 2023-01-01 RX ADMIN — INSULIN ASPART 6 UNITS: 100 INJECTION, SOLUTION INTRAVENOUS; SUBCUTANEOUS at 01:06

## 2023-01-01 RX ADMIN — LISINOPRIL 20 MG: 20 TABLET ORAL at 05:03

## 2023-01-01 RX ADMIN — INSULIN ASPART 4 UNITS: 100 INJECTION, SOLUTION INTRAVENOUS; SUBCUTANEOUS at 06:10

## 2023-01-01 RX ADMIN — APIXABAN 5 MG: 5 TABLET, FILM COATED ORAL at 07:10

## 2023-01-01 RX ADMIN — SODIUM ZIRCONIUM CYCLOSILICATE 5 G: 5 POWDER, FOR SUSPENSION ORAL at 01:03

## 2023-01-01 RX ADMIN — POTASSIUM CHLORIDE 40 MEQ: 1500 TABLET, EXTENDED RELEASE ORAL at 12:03

## 2023-01-01 RX ADMIN — FLUOXETINE 40 MG: 20 CAPSULE ORAL at 08:08

## 2023-01-01 RX ADMIN — IPRATROPIUM BROMIDE AND ALBUTEROL SULFATE 3 ML: .5; 3 SOLUTION RESPIRATORY (INHALATION) at 03:06

## 2023-01-01 RX ADMIN — APIXABAN 5 MG: 5 TABLET, FILM COATED ORAL at 08:10

## 2023-01-01 RX ADMIN — MINERAL OIL AND WHITE PETROLATUM: 30; 940 OINTMENT OPHTHALMIC at 09:11

## 2023-01-01 RX ADMIN — CARVEDILOL 6.25 MG: 6.25 TABLET, FILM COATED ORAL at 08:06

## 2023-01-01 RX ADMIN — ASPIRIN 81 MG: 81 TABLET, COATED ORAL at 08:06

## 2023-01-01 RX ADMIN — MUPIROCIN: 20 OINTMENT TOPICAL at 09:08

## 2023-01-01 RX ADMIN — EPOETIN ALFA-EPBX 3510 UNITS: 3000 INJECTION, SOLUTION INTRAVENOUS; SUBCUTANEOUS at 02:10

## 2023-01-01 RX ADMIN — HYDRALAZINE HYDROCHLORIDE 100 MG: 50 TABLET ORAL at 08:03

## 2023-01-01 RX ADMIN — ERYTHROPOIETIN 3500 UNITS: 2000 INJECTION, SOLUTION INTRAVENOUS; SUBCUTANEOUS at 12:09

## 2023-01-01 RX ADMIN — ATORVASTATIN CALCIUM 40 MG: 40 TABLET, FILM COATED ORAL at 08:08

## 2023-01-01 RX ADMIN — INSULIN ASPART 3 UNITS: 100 INJECTION, SOLUTION INTRAVENOUS; SUBCUTANEOUS at 11:10

## 2023-01-01 RX ADMIN — NIFEDIPINE 60 MG: 30 TABLET, FILM COATED, EXTENDED RELEASE ORAL at 09:04

## 2023-01-01 RX ADMIN — INSULIN ASPART 2 UNITS: 100 INJECTION, SOLUTION INTRAVENOUS; SUBCUTANEOUS at 07:06

## 2023-01-01 RX ADMIN — THERA TABS 1 TABLET: TAB at 09:10

## 2023-01-01 RX ADMIN — Medication 30000 MG: at 01:03

## 2023-01-01 RX ADMIN — ATORVASTATIN CALCIUM 40 MG: 40 TABLET, FILM COATED ORAL at 11:10

## 2023-01-01 RX ADMIN — FLUTICASONE FUROATE AND VILANTEROL TRIFENATATE 1 PUFF: 100; 25 POWDER RESPIRATORY (INHALATION) at 08:06

## 2023-01-01 RX ADMIN — CARVEDILOL 6.25 MG: 6.25 TABLET, FILM COATED ORAL at 09:06

## 2023-01-01 RX ADMIN — INSULIN ASPART 2 UNITS: 100 INJECTION, SOLUTION INTRAVENOUS; SUBCUTANEOUS at 11:10

## 2023-01-01 RX ADMIN — IPRATROPIUM BROMIDE AND ALBUTEROL SULFATE 3 ML: 2.5; .5 SOLUTION RESPIRATORY (INHALATION) at 06:01

## 2023-01-01 RX ADMIN — VANCOMYCIN HYDROCHLORIDE 1250 MG: 1.25 INJECTION, POWDER, LYOPHILIZED, FOR SOLUTION INTRAVENOUS at 02:11

## 2023-01-01 RX ADMIN — INSULIN ASPART 5 UNITS: 100 INJECTION, SOLUTION INTRAVENOUS; SUBCUTANEOUS at 12:09

## 2023-01-01 RX ADMIN — FLUOXETINE 40 MG: 20 CAPSULE ORAL at 09:08

## 2023-01-01 RX ADMIN — IPRATROPIUM BROMIDE AND ALBUTEROL SULFATE 3 ML: .5; 3 SOLUTION RESPIRATORY (INHALATION) at 03:10

## 2023-01-01 RX ADMIN — FLUOXETINE 40 MG: 20 CAPSULE ORAL at 01:10

## 2023-01-01 RX ADMIN — APIXABAN 5 MG: 5 TABLET, FILM COATED ORAL at 08:06

## 2023-01-01 RX ADMIN — REMDESIVIR 100 MG: 100 INJECTION, POWDER, LYOPHILIZED, FOR SOLUTION INTRAVENOUS at 09:10

## 2023-01-01 RX ADMIN — PIPERACILLIN SODIUM AND TAZOBACTAM SODIUM 4.5 G: 4; .5 INJECTION, POWDER, FOR SOLUTION INTRAVENOUS at 09:11

## 2023-01-01 RX ADMIN — ATORVASTATIN CALCIUM 40 MG: 40 TABLET, FILM COATED ORAL at 08:06

## 2023-01-01 RX ADMIN — HYDRALAZINE HYDROCHLORIDE 25 MG: 25 TABLET, FILM COATED ORAL at 04:03

## 2023-01-01 RX ADMIN — IPRATROPIUM BROMIDE AND ALBUTEROL SULFATE 3 ML: .5; 3 SOLUTION RESPIRATORY (INHALATION) at 11:06

## 2023-01-01 RX ADMIN — NEPHROCAP 1 CAPSULE: 1 CAP ORAL at 08:03

## 2023-01-01 RX ADMIN — INSULIN ASPART 5 UNITS: 100 INJECTION, SOLUTION INTRAVENOUS; SUBCUTANEOUS at 05:10

## 2023-01-01 RX ADMIN — CARVEDILOL 12.5 MG: 12.5 TABLET, FILM COATED ORAL at 05:10

## 2023-01-01 RX ADMIN — NIFEDIPINE 60 MG: 30 TABLET, FILM COATED, EXTENDED RELEASE ORAL at 08:04

## 2023-01-01 RX ADMIN — PIPERACILLIN SODIUM AND TAZOBACTAM SODIUM 4.5 G: 4; .5 INJECTION, POWDER, FOR SOLUTION INTRAVENOUS at 02:11

## 2023-01-01 RX ADMIN — HYDROCORTISONE SODIUM SUCCINATE 100 MG: 100 INJECTION, POWDER, FOR SOLUTION INTRAMUSCULAR; INTRAVENOUS at 06:11

## 2023-01-01 RX ADMIN — INSULIN ASPART 4 UNITS: 100 INJECTION, SOLUTION INTRAVENOUS; SUBCUTANEOUS at 12:03

## 2023-01-01 RX ADMIN — NIFEDIPINE 60 MG: 30 TABLET, FILM COATED, EXTENDED RELEASE ORAL at 09:10

## 2023-01-01 RX ADMIN — INSULIN ASPART 3 UNITS: 100 INJECTION, SOLUTION INTRAVENOUS; SUBCUTANEOUS at 08:03

## 2023-01-01 RX ADMIN — INSULIN ASPART 10 UNITS: 100 INJECTION, SOLUTION INTRAVENOUS; SUBCUTANEOUS at 04:09

## 2023-01-01 RX ADMIN — INSULIN ASPART 6 UNITS: 100 INJECTION, SOLUTION INTRAVENOUS; SUBCUTANEOUS at 04:06

## 2023-01-01 RX ADMIN — SEVELAMER CARBONATE 800 MG: 800 TABLET, FILM COATED ORAL at 08:06

## 2023-01-01 RX ADMIN — NOREPINEPHRINE BITARTRATE 0.1 MCG/KG/MIN: 8 INJECTION, SOLUTION INTRAVENOUS at 06:11

## 2023-01-01 RX ADMIN — INSULIN ASPART 4 UNITS: 100 INJECTION, SOLUTION INTRAVENOUS; SUBCUTANEOUS at 05:03

## 2023-01-01 RX ADMIN — FLUORESCEIN SODIUM 1 EACH: 1 STRIP OPHTHALMIC at 05:02

## 2023-01-01 RX ADMIN — TIOTROPIUM BROMIDE INHALATION SPRAY 2 PUFF: 3.12 SPRAY, METERED RESPIRATORY (INHALATION) at 07:06

## 2023-01-01 RX ADMIN — APIXABAN 5 MG: 5 TABLET, FILM COATED ORAL at 10:03

## 2023-01-01 RX ADMIN — INSULIN ASPART 5 UNITS: 100 INJECTION, SOLUTION INTRAVENOUS; SUBCUTANEOUS at 08:10

## 2023-01-01 RX ADMIN — SODIUM CHLORIDE: 9 INJECTION, SOLUTION INTRAVENOUS at 08:06

## 2023-01-01 RX ADMIN — DEXTROSE MONOHYDRATE 250 ML: 100 INJECTION, SOLUTION INTRAVENOUS at 02:08

## 2023-01-01 RX ADMIN — FLUOXETINE 40 MG: 20 CAPSULE ORAL at 01:06

## 2023-01-01 RX ADMIN — Medication 6 MG: at 01:03

## 2023-01-01 RX ADMIN — HYDRALAZINE HYDROCHLORIDE 100 MG: 25 TABLET, FILM COATED ORAL at 04:02

## 2023-01-01 RX ADMIN — INSULIN ASPART 2 UNITS: 100 INJECTION, SOLUTION INTRAVENOUS; SUBCUTANEOUS at 01:06

## 2023-01-01 RX ADMIN — INSULIN ASPART 4 UNITS: 100 INJECTION, SOLUTION INTRAVENOUS; SUBCUTANEOUS at 12:08

## 2023-01-01 RX ADMIN — HYPROMELLOSE 2910 1 DROP: 5 SOLUTION/ DROPS OPHTHALMIC at 06:03

## 2023-01-01 RX ADMIN — HYPROMELLOSE 2910 1 DROP: 5 SOLUTION/ DROPS OPHTHALMIC at 03:06

## 2023-01-01 RX ADMIN — HYPROMELLOSE 2910 1 DROP: 5 SOLUTION/ DROPS OPHTHALMIC at 09:06

## 2023-01-01 RX ADMIN — FLUOXETINE 40 MG: 20 CAPSULE ORAL at 09:04

## 2023-01-01 RX ADMIN — Medication: at 08:03

## 2023-01-01 RX ADMIN — Medication 10 MG: at 07:08

## 2023-01-01 RX ADMIN — ERYTHROMYCIN: 5 OINTMENT OPHTHALMIC at 01:06

## 2023-01-01 RX ADMIN — DEXAMETHASONE 6 MG: 4 TABLET ORAL at 08:10

## 2023-01-01 RX ADMIN — INSULIN ASPART 6 UNITS: 100 INJECTION, SOLUTION INTRAVENOUS; SUBCUTANEOUS at 08:06

## 2023-01-01 RX ADMIN — FLUTICASONE FUROATE AND VILANTEROL TRIFENATATE 1 PUFF: 100; 25 POWDER RESPIRATORY (INHALATION) at 09:03

## 2023-01-01 RX ADMIN — FLUOXETINE 40 MG: 20 CAPSULE ORAL at 08:04

## 2023-01-01 RX ADMIN — ERYTHROMYCIN: 5 OINTMENT OPHTHALMIC at 10:06

## 2023-01-01 RX ADMIN — HEPARIN SODIUM 5000 UNITS: 5000 INJECTION INTRAVENOUS; SUBCUTANEOUS at 10:10

## 2023-01-01 RX ADMIN — IPRATROPIUM BROMIDE AND ALBUTEROL SULFATE 3 ML: .5; 3 SOLUTION RESPIRATORY (INHALATION) at 02:10

## 2023-01-01 RX ADMIN — CARVEDILOL 12.5 MG: 12.5 TABLET, FILM COATED ORAL at 10:08

## 2023-01-01 RX ADMIN — INSULIN DETEMIR 5 UNITS: 100 INJECTION, SOLUTION SUBCUTANEOUS at 09:08

## 2023-01-01 RX ADMIN — HYPROMELLOSE 2910 1 DROP: 5 SOLUTION/ DROPS OPHTHALMIC at 01:06

## 2023-01-01 RX ADMIN — INSULIN ASPART 2 UNITS: 100 INJECTION, SOLUTION INTRAVENOUS; SUBCUTANEOUS at 11:09

## 2023-01-01 RX ADMIN — FLUTICASONE FUROATE AND VILANTEROL TRIFENATATE 1 PUFF: 100; 25 POWDER RESPIRATORY (INHALATION) at 08:10

## 2023-01-01 RX ADMIN — APIXABAN 5 MG: 5 TABLET, FILM COATED ORAL at 10:08

## 2023-01-01 RX ADMIN — ASPIRIN 81 MG: 81 TABLET, COATED ORAL at 03:03

## 2023-01-01 RX ADMIN — INSULIN ASPART 6 UNITS: 100 INJECTION, SOLUTION INTRAVENOUS; SUBCUTANEOUS at 08:08

## 2023-01-01 RX ADMIN — APIXABAN 2.5 MG: 2.5 TABLET, FILM COATED ORAL at 08:10

## 2023-01-01 RX ADMIN — NASAL 1 SPRAY: 6.5 SPRAY NASAL at 10:08

## 2023-01-01 RX ADMIN — Medication: at 01:03

## 2023-01-01 RX ADMIN — INSULIN DETEMIR 2 UNITS: 100 INJECTION, SOLUTION SUBCUTANEOUS at 10:10

## 2023-01-01 RX ADMIN — ERYTHROPOIETIN 4000 UNITS: 4000 INJECTION, SOLUTION INTRAVENOUS; SUBCUTANEOUS at 12:08

## 2023-01-01 RX ADMIN — MUPIROCIN: 20 OINTMENT TOPICAL at 10:10

## 2023-01-01 RX ADMIN — TIOTROPIUM BROMIDE INHALATION SPRAY 2 PUFF: 3.12 SPRAY, METERED RESPIRATORY (INHALATION) at 02:09

## 2023-01-01 RX ADMIN — INSULIN DETEMIR 8 UNITS: 100 INJECTION, SOLUTION SUBCUTANEOUS at 08:04

## 2023-01-01 RX ADMIN — INSULIN ASPART 4 UNITS: 100 INJECTION, SOLUTION INTRAVENOUS; SUBCUTANEOUS at 08:10

## 2023-01-01 RX ADMIN — MUPIROCIN: 20 OINTMENT TOPICAL at 08:03

## 2023-01-01 RX ADMIN — FUROSEMIDE 120 MG: 10 INJECTION, SOLUTION INTRAMUSCULAR; INTRAVENOUS at 08:03

## 2023-01-01 RX ADMIN — INSULIN DETEMIR 5 UNITS: 100 INJECTION, SOLUTION SUBCUTANEOUS at 10:08

## 2023-01-01 RX ADMIN — ISOSORBIDE MONONITRATE 30 MG: 30 TABLET, EXTENDED RELEASE ORAL at 08:04

## 2023-01-01 RX ADMIN — INSULIN HUMAN 0.1 UNITS/KG/HR: 1 INJECTION, SOLUTION INTRAVENOUS at 01:08

## 2023-01-01 RX ADMIN — FLUOXETINE 40 MG: 20 CAPSULE ORAL at 01:03

## 2023-01-01 RX ADMIN — VANCOMYCIN HYDROCHLORIDE 1250 MG: 1.25 INJECTION, POWDER, LYOPHILIZED, FOR SOLUTION INTRAVENOUS at 04:10

## 2023-01-01 RX ADMIN — FENTANYL CITRATE 50 MCG: 50 INJECTION, SOLUTION INTRAMUSCULAR; INTRAVENOUS at 01:07

## 2023-01-01 RX ADMIN — SEVELAMER CARBONATE 800 MG: 800 TABLET, FILM COATED ORAL at 12:04

## 2023-01-01 RX ADMIN — NIFEDIPINE 60 MG: 60 TABLET, FILM COATED, EXTENDED RELEASE ORAL at 09:08

## 2023-01-01 RX ADMIN — CARVEDILOL 6.25 MG: 6.25 TABLET, FILM COATED ORAL at 08:04

## 2023-01-01 RX ADMIN — HYDRALAZINE HYDROCHLORIDE 10 MG: 20 INJECTION, SOLUTION INTRAMUSCULAR; INTRAVENOUS at 04:03

## 2023-01-01 RX ADMIN — INSULIN ASPART 6 UNITS: 100 INJECTION, SOLUTION INTRAVENOUS; SUBCUTANEOUS at 05:03

## 2023-01-01 RX ADMIN — INSULIN ASPART 2 UNITS: 100 INJECTION, SOLUTION INTRAVENOUS; SUBCUTANEOUS at 08:09

## 2023-01-01 RX ADMIN — ATORVASTATIN CALCIUM 40 MG: 40 TABLET, FILM COATED ORAL at 10:10

## 2023-01-01 RX ADMIN — WHITE PETROLATUM: 1.75 OINTMENT TOPICAL at 10:08

## 2023-01-01 RX ADMIN — PIPERACILLIN SODIUM AND TAZOBACTAM SODIUM 4.5 G: 4; .5 INJECTION, POWDER, FOR SOLUTION INTRAVENOUS at 12:10

## 2023-01-01 RX ADMIN — HYDRALAZINE HYDROCHLORIDE 10 MG: 20 INJECTION, SOLUTION INTRAMUSCULAR; INTRAVENOUS at 10:10

## 2023-01-01 RX ADMIN — THERA TABS 1 TABLET: TAB at 08:10

## 2023-01-01 RX ADMIN — HYPROMELLOSE 2910 1 DROP: 5 SOLUTION/ DROPS OPHTHALMIC at 09:03

## 2023-01-01 RX ADMIN — MUPIROCIN: 20 OINTMENT TOPICAL at 08:08

## 2023-01-01 RX ADMIN — NICARDIPINE HYDROCHLORIDE 5 MG/HR: 0.2 INJECTION, SOLUTION INTRAVENOUS at 10:03

## 2023-01-01 RX ADMIN — TIOTROPIUM BROMIDE INHALATION SPRAY 2 PUFF: 3.12 SPRAY, METERED RESPIRATORY (INHALATION) at 08:04

## 2023-01-01 RX ADMIN — DEXMEDETOMIDINE HYDROCHLORIDE 1.2 MCG/KG/HR: 4 INJECTION INTRAVENOUS at 11:10

## 2023-01-01 RX ADMIN — HYPROMELLOSE 2910 1 DROP: 5 SOLUTION/ DROPS OPHTHALMIC at 05:06

## 2023-01-01 RX ADMIN — INSULIN ASPART 1 UNITS: 100 INJECTION, SOLUTION INTRAVENOUS; SUBCUTANEOUS at 08:06

## 2023-01-01 RX ADMIN — INSULIN ASPART 2 UNITS: 100 INJECTION, SOLUTION INTRAVENOUS; SUBCUTANEOUS at 05:06

## 2023-01-01 RX ADMIN — DEXTROSE: 20 INJECTION, SOLUTION INTRAVENOUS at 04:10

## 2023-01-01 RX ADMIN — ONDANSETRON 4 MG: 2 INJECTION INTRAMUSCULAR; INTRAVENOUS at 12:08

## 2023-01-01 RX ADMIN — HYPROMELLOSE 2910 1 DROP: 5 SOLUTION/ DROPS OPHTHALMIC at 05:03

## 2023-01-01 RX ADMIN — LINEZOLID 600 MG: 600 INJECTION, SOLUTION INTRAVENOUS at 06:11

## 2023-01-01 RX ADMIN — HYDRALAZINE HYDROCHLORIDE 100 MG: 50 TABLET ORAL at 09:01

## 2023-01-01 RX ADMIN — CLONIDINE 1 PATCH: 0.3 PATCH TRANSDERMAL at 10:09

## 2023-01-01 RX ADMIN — FLUOXETINE 40 MG: 20 CAPSULE ORAL at 08:10

## 2023-01-01 RX ADMIN — METHYLPREDNISOLONE SODIUM SUCCINATE 125 MG: 125 INJECTION, POWDER, FOR SOLUTION INTRAMUSCULAR; INTRAVENOUS at 12:06

## 2023-01-01 RX ADMIN — NEPHROCAP 1 CAPSULE: 1 CAP ORAL at 08:02

## 2023-01-01 RX ADMIN — HYDRALAZINE HYDROCHLORIDE 5 MG: 20 INJECTION, SOLUTION INTRAMUSCULAR; INTRAVENOUS at 04:08

## 2023-01-01 RX ADMIN — SEVELAMER CARBONATE 800 MG: 800 TABLET, FILM COATED ORAL at 09:08

## 2023-01-01 RX ADMIN — CARVEDILOL 12.5 MG: 12.5 TABLET, FILM COATED ORAL at 08:08

## 2023-01-01 RX ADMIN — INSULIN ASPART 3 UNITS: 100 INJECTION, SOLUTION INTRAVENOUS; SUBCUTANEOUS at 07:03

## 2023-01-01 RX ADMIN — INSULIN HUMAN 0.2 UNITS/KG/HR: 1 INJECTION, SOLUTION INTRAVENOUS at 09:03

## 2023-01-01 RX ADMIN — TIOTROPIUM BROMIDE INHALATION SPRAY 2 PUFF: 3.12 SPRAY, METERED RESPIRATORY (INHALATION) at 09:08

## 2023-01-01 RX ADMIN — APIXABAN 5 MG: 5 TABLET, FILM COATED ORAL at 08:08

## 2023-01-01 RX ADMIN — HYDRALAZINE HYDROCHLORIDE 10 MG: 20 INJECTION, SOLUTION INTRAMUSCULAR; INTRAVENOUS at 01:03

## 2023-01-01 RX ADMIN — ISOSORBIDE MONONITRATE 30 MG: 30 TABLET, EXTENDED RELEASE ORAL at 09:08

## 2023-01-01 RX ADMIN — APIXABAN 5 MG: 5 TABLET, FILM COATED ORAL at 08:03

## 2023-01-01 RX ADMIN — NIFEDIPINE 60 MG: 60 TABLET, FILM COATED, EXTENDED RELEASE ORAL at 08:08

## 2023-01-01 RX ADMIN — METRONIDAZOLE 500 MG: 500 TABLET ORAL at 09:06

## 2023-01-01 RX ADMIN — DEXMEDETOMIDINE HYDROCHLORIDE 0.4 MCG/KG/HR: 4 INJECTION INTRAVENOUS at 03:10

## 2023-01-01 RX ADMIN — INSULIN ASPART 2 UNITS: 100 INJECTION, SOLUTION INTRAVENOUS; SUBCUTANEOUS at 05:03

## 2023-01-01 RX ADMIN — DEXTROSE MONOHYDRATE 250 ML: 100 INJECTION, SOLUTION INTRAVENOUS at 03:11

## 2023-01-01 RX ADMIN — OXYCODONE HYDROCHLORIDE AND ACETAMINOPHEN 500 MG: 500 TABLET ORAL at 08:10

## 2023-01-01 RX ADMIN — ERYTHROMYCIN: 5 OINTMENT OPHTHALMIC at 01:03

## 2023-01-01 RX ADMIN — IPRATROPIUM BROMIDE AND ALBUTEROL SULFATE 3 ML: .5; 3 SOLUTION RESPIRATORY (INHALATION) at 08:06

## 2023-01-01 RX ADMIN — ISOSORBIDE MONONITRATE 30 MG: 30 TABLET, EXTENDED RELEASE ORAL at 10:03

## 2023-01-01 RX ADMIN — CLONIDINE 1 PATCH: 0.3 PATCH TRANSDERMAL at 10:08

## 2023-01-01 RX ADMIN — INSULIN DETEMIR 5 UNITS: 100 INJECTION, SOLUTION SUBCUTANEOUS at 08:08

## 2023-01-01 RX ADMIN — TIOTROPIUM BROMIDE INHALATION SPRAY 2 PUFF: 3.12 SPRAY, METERED RESPIRATORY (INHALATION) at 11:10

## 2023-01-01 RX ADMIN — HYDRALAZINE HYDROCHLORIDE 10 MG: 20 INJECTION, SOLUTION INTRAMUSCULAR; INTRAVENOUS at 02:03

## 2023-01-01 RX ADMIN — ISOSORBIDE MONONITRATE 30 MG: 30 TABLET, EXTENDED RELEASE ORAL at 08:01

## 2023-01-01 RX ADMIN — INSULIN ASPART 4 UNITS: 100 INJECTION, SOLUTION INTRAVENOUS; SUBCUTANEOUS at 04:03

## 2023-01-01 RX ADMIN — HYDRALAZINE HYDROCHLORIDE 100 MG: 50 TABLET ORAL at 03:03

## 2023-01-01 RX ADMIN — DEXTROSE MONOHYDRATE: 100 INJECTION, SOLUTION INTRAVENOUS at 08:07

## 2023-01-01 RX ADMIN — HYDRALAZINE HYDROCHLORIDE 25 MG: 25 TABLET, FILM COATED ORAL at 01:02

## 2023-01-01 RX ADMIN — DOXYCYCLINE 100 MG: 100 INJECTION, POWDER, LYOPHILIZED, FOR SOLUTION INTRAVENOUS at 04:10

## 2023-01-01 RX ADMIN — CARVEDILOL 12.5 MG: 12.5 TABLET, FILM COATED ORAL at 10:09

## 2023-01-01 RX ADMIN — SODIUM CHLORIDE: 9 INJECTION, SOLUTION INTRAVENOUS at 04:11

## 2023-01-01 RX ADMIN — Medication 25 G: at 02:10

## 2023-01-01 RX ADMIN — FAMOTIDINE 20 MG: 10 INJECTION, SOLUTION INTRAVENOUS at 12:10

## 2023-01-01 RX ADMIN — DEXTROSE AND SODIUM CHLORIDE: 5; 450 INJECTION, SOLUTION INTRAVENOUS at 01:04

## 2023-01-01 RX ADMIN — INSULIN ASPART 6 UNITS: 100 INJECTION, SOLUTION INTRAVENOUS; SUBCUTANEOUS at 04:03

## 2023-01-01 RX ADMIN — INSULIN ASPART 8 UNITS: 100 INJECTION, SOLUTION INTRAVENOUS; SUBCUTANEOUS at 05:06

## 2023-01-01 RX ADMIN — INSULIN ASPART 2 UNITS: 100 INJECTION, SOLUTION INTRAVENOUS; SUBCUTANEOUS at 12:04

## 2023-01-01 RX ADMIN — NIFEDIPINE 90 MG: 30 TABLET, FILM COATED, EXTENDED RELEASE ORAL at 08:02

## 2023-01-01 RX ADMIN — INSULIN DETEMIR 5 UNITS: 100 INJECTION, SOLUTION SUBCUTANEOUS at 08:02

## 2023-01-01 RX ADMIN — ASPIRIN 81 MG: 81 TABLET, COATED ORAL at 08:08

## 2023-01-01 RX ADMIN — INSULIN ASPART 6 UNITS: 100 INJECTION, SOLUTION INTRAVENOUS; SUBCUTANEOUS at 09:03

## 2023-01-01 RX ADMIN — OXYMETAZOLINE HYDROCHLORIDE 2 SPRAY: 0.05 SPRAY NASAL at 10:08

## 2023-01-01 RX ADMIN — HYDRALAZINE HYDROCHLORIDE 100 MG: 50 TABLET ORAL at 05:03

## 2023-01-01 RX ADMIN — ROCURONIUM BROMIDE 70 MG: 10 INJECTION, SOLUTION INTRAVENOUS at 11:10

## 2023-01-01 RX ADMIN — ATORVASTATIN CALCIUM 40 MG: 40 TABLET, FILM COATED ORAL at 08:03

## 2023-01-01 RX ADMIN — IPRATROPIUM BROMIDE AND ALBUTEROL SULFATE 3 ML: .5; 3 SOLUTION RESPIRATORY (INHALATION) at 04:06

## 2023-01-01 RX ADMIN — INSULIN ASPART 5 UNITS: 100 INJECTION, SOLUTION INTRAVENOUS; SUBCUTANEOUS at 03:03

## 2023-01-01 RX ADMIN — INSULIN ASPART 4 UNITS: 100 INJECTION, SOLUTION INTRAVENOUS; SUBCUTANEOUS at 09:03

## 2023-01-01 RX ADMIN — NIFEDIPINE 60 MG: 30 TABLET, FILM COATED, EXTENDED RELEASE ORAL at 07:03

## 2023-01-01 RX ADMIN — INSULIN ASPART 5 UNITS: 100 INJECTION, SOLUTION INTRAVENOUS; SUBCUTANEOUS at 08:04

## 2023-01-01 RX ADMIN — HYDRALAZINE HYDROCHLORIDE 10 MG: 20 INJECTION, SOLUTION INTRAMUSCULAR; INTRAVENOUS at 12:03

## 2023-01-01 RX ADMIN — NIFEDIPINE 60 MG: 30 TABLET, FILM COATED, EXTENDED RELEASE ORAL at 12:10

## 2023-01-01 RX ADMIN — HEPARIN SODIUM 5000 UNITS: 5000 INJECTION INTRAVENOUS; SUBCUTANEOUS at 09:10

## 2023-01-01 RX ADMIN — HYDRALAZINE HYDROCHLORIDE 10 MG: 20 INJECTION, SOLUTION INTRAMUSCULAR; INTRAVENOUS at 09:10

## 2023-01-01 RX ADMIN — INSULIN ASPART 3 UNITS: 100 INJECTION, SOLUTION INTRAVENOUS; SUBCUTANEOUS at 11:03

## 2023-01-01 RX ADMIN — CLONIDINE 1 PATCH: 0.3 PATCH TRANSDERMAL at 08:08

## 2023-01-01 RX ADMIN — SODIUM CHLORIDE: 9 INJECTION, SOLUTION INTRAVENOUS at 09:08

## 2023-01-01 RX ADMIN — LISINOPRIL 40 MG: 20 TABLET ORAL at 10:08

## 2023-01-01 RX ADMIN — ATORVASTATIN CALCIUM 40 MG: 40 TABLET, FILM COATED ORAL at 10:06

## 2023-01-01 RX ADMIN — FLUTICASONE FUROATE AND VILANTEROL TRIFENATATE 1 PUFF: 100; 25 POWDER RESPIRATORY (INHALATION) at 09:04

## 2023-01-01 RX ADMIN — MINERAL OIL, PETROLATUM: 425; 573 OINTMENT OPHTHALMIC at 09:03

## 2023-01-01 RX ADMIN — Medication 3 MG: at 08:06

## 2023-01-01 RX ADMIN — HYDRALAZINE HYDROCHLORIDE 100 MG: 50 TABLET ORAL at 02:03

## 2023-01-01 RX ADMIN — IPRATROPIUM BROMIDE AND ALBUTEROL SULFATE 3 ML: .5; 3 SOLUTION RESPIRATORY (INHALATION) at 12:06

## 2023-01-01 RX ADMIN — INSULIN ASPART 5 UNITS: 100 INJECTION, SOLUTION INTRAVENOUS; SUBCUTANEOUS at 09:03

## 2023-01-01 RX ADMIN — ISOSORBIDE MONONITRATE 30 MG: 30 TABLET, EXTENDED RELEASE ORAL at 12:02

## 2023-01-01 RX ADMIN — INSULIN ASPART 6 UNITS: 100 INJECTION, SOLUTION INTRAVENOUS; SUBCUTANEOUS at 07:06

## 2023-01-01 RX ADMIN — FLUTICASONE FUROATE AND VILANTEROL TRIFENATATE 1 PUFF: 100; 25 POWDER RESPIRATORY (INHALATION) at 09:08

## 2023-01-01 RX ADMIN — OXYCODONE HYDROCHLORIDE AND ACETAMINOPHEN 500 MG: 500 TABLET ORAL at 09:10

## 2023-01-01 RX ADMIN — DEXTROSE MONOHYDRATE 250 ML: 100 INJECTION, SOLUTION INTRAVENOUS at 06:11

## 2023-01-01 RX ADMIN — INSULIN ASPART 2 UNITS: 100 INJECTION, SOLUTION INTRAVENOUS; SUBCUTANEOUS at 03:10

## 2023-01-01 RX ADMIN — APIXABAN 5 MG: 5 TABLET, FILM COATED ORAL at 09:08

## 2023-01-01 RX ADMIN — Medication 6 MG: at 10:03

## 2023-01-01 RX ADMIN — PROPOFOL 20 MCG/KG/MIN: 10 INJECTION, EMULSION INTRAVENOUS at 11:10

## 2023-01-01 RX ADMIN — ATORVASTATIN CALCIUM 40 MG: 40 TABLET, FILM COATED ORAL at 03:03

## 2023-01-01 RX ADMIN — Medication 15000 MG: at 07:03

## 2023-01-01 RX ADMIN — PANTOPRAZOLE SODIUM 40 MG: 40 INJECTION, POWDER, FOR SOLUTION INTRAVENOUS at 09:11

## 2023-01-01 RX ADMIN — INSULIN ASPART 6 UNITS: 100 INJECTION, SOLUTION INTRAVENOUS; SUBCUTANEOUS at 07:03

## 2023-01-01 RX ADMIN — APIXABAN 5 MG: 5 TABLET, FILM COATED ORAL at 11:03

## 2023-01-01 RX ADMIN — MIDAZOLAM HYDROCHLORIDE 1 MG: 1 INJECTION, SOLUTION INTRAMUSCULAR; INTRAVENOUS at 01:08

## 2023-01-01 RX ADMIN — HYDRALAZINE HYDROCHLORIDE 100 MG: 50 TABLET ORAL at 01:03

## 2023-01-01 RX ADMIN — DEXTROSE AND SODIUM CHLORIDE 125 ML/HR: 5; 450 INJECTION, SOLUTION INTRAVENOUS at 09:08

## 2023-01-01 RX ADMIN — HYPROMELLOSE 2910 1 DROP: 5 SOLUTION/ DROPS OPHTHALMIC at 06:06

## 2023-01-01 RX ADMIN — ASPIRIN 81 MG: 81 TABLET, COATED ORAL at 12:02

## 2023-01-01 RX ADMIN — HYDRALAZINE HYDROCHLORIDE 25 MG: 25 TABLET, FILM COATED ORAL at 03:08

## 2023-01-01 RX ADMIN — INSULIN ASPART 3 UNITS: 100 INJECTION, SOLUTION INTRAVENOUS; SUBCUTANEOUS at 04:03

## 2023-01-01 RX ADMIN — AMOXICILLIN AND CLAVULANATE POTASSIUM 500 MG: 500; 125 TABLET, FILM COATED ORAL at 08:06

## 2023-01-01 RX ADMIN — DEXTROSE MONOHYDRATE 250 ML: 100 INJECTION, SOLUTION INTRAVENOUS at 09:11

## 2023-01-01 RX ADMIN — INSULIN HUMAN 10 UNITS: 100 INJECTION, SOLUTION PARENTERAL at 06:03

## 2023-01-01 RX ADMIN — NIFEDIPINE 60 MG: 60 TABLET, FILM COATED, EXTENDED RELEASE ORAL at 01:08

## 2023-01-01 RX ADMIN — Medication 16 G: at 05:10

## 2023-01-01 RX ADMIN — FLUOXETINE 40 MG: 20 CAPSULE ORAL at 11:10

## 2023-01-01 RX ADMIN — ERYTHROMYCIN: 5 OINTMENT OPHTHALMIC at 03:06

## 2023-01-01 RX ADMIN — DEXMEDETOMIDINE HYDROCHLORIDE 1.4 MCG/KG/HR: 4 INJECTION INTRAVENOUS at 06:10

## 2023-01-01 RX ADMIN — DEXTROSE MONOHYDRATE 250 ML: 100 INJECTION, SOLUTION INTRAVENOUS at 08:03

## 2023-01-01 RX ADMIN — INSULIN ASPART 1 UNITS: 100 INJECTION, SOLUTION INTRAVENOUS; SUBCUTANEOUS at 09:10

## 2023-01-01 RX ADMIN — HYDROCORTISONE SODIUM SUCCINATE 100 MG: 100 INJECTION, POWDER, FOR SOLUTION INTRAMUSCULAR; INTRAVENOUS at 10:11

## 2023-01-01 RX ADMIN — NASAL 1 SPRAY: 6.5 SPRAY NASAL at 09:08

## 2023-01-01 RX ADMIN — VANCOMYCIN HYDROCHLORIDE 500 MG: 500 INJECTION, POWDER, LYOPHILIZED, FOR SOLUTION INTRAVENOUS at 09:10

## 2023-01-01 RX ADMIN — CEFEPIME 1 G: 1 INJECTION, POWDER, FOR SOLUTION INTRAMUSCULAR; INTRAVENOUS at 05:03

## 2023-01-01 RX ADMIN — LISINOPRIL 40 MG: 20 TABLET ORAL at 08:08

## 2023-01-01 RX ADMIN — FLUTICASONE FUROATE AND VILANTEROL TRIFENATATE 1 PUFF: 100; 25 POWDER RESPIRATORY (INHALATION) at 07:03

## 2023-01-01 RX ADMIN — NIFEDIPINE 30 MG: 30 TABLET, FILM COATED, EXTENDED RELEASE ORAL at 12:04

## 2023-01-01 RX ADMIN — INSULIN DETEMIR 5 UNITS: 100 INJECTION, SOLUTION SUBCUTANEOUS at 09:03

## 2023-01-01 RX ADMIN — INSULIN ASPART 6 UNITS: 100 INJECTION, SOLUTION INTRAVENOUS; SUBCUTANEOUS at 05:06

## 2023-01-01 RX ADMIN — QUETIAPINE FUMARATE 25 MG: 25 TABLET ORAL at 02:08

## 2023-01-01 RX ADMIN — ISOSORBIDE MONONITRATE 30 MG: 30 TABLET, EXTENDED RELEASE ORAL at 09:03

## 2023-01-01 RX ADMIN — DEXTROSE: 20 INJECTION, SOLUTION INTRAVENOUS at 11:11

## 2023-01-01 RX ADMIN — FENTANYL CITRATE 25 MCG: 50 INJECTION, SOLUTION INTRAMUSCULAR; INTRAVENOUS at 01:08

## 2023-01-01 RX ADMIN — INSULIN DETEMIR 2 UNITS: 100 INJECTION, SOLUTION SUBCUTANEOUS at 09:10

## 2023-01-01 RX ADMIN — INSULIN ASPART 1 UNITS: 100 INJECTION, SOLUTION INTRAVENOUS; SUBCUTANEOUS at 12:01

## 2023-01-01 RX ADMIN — FLUOXETINE 40 MG: 20 CAPSULE ORAL at 08:09

## 2023-01-01 RX ADMIN — APIXABAN 5 MG: 5 TABLET, FILM COATED ORAL at 01:06

## 2023-01-01 RX ADMIN — IOHEXOL 75 ML: 350 INJECTION, SOLUTION INTRAVENOUS at 01:03

## 2023-01-01 RX ADMIN — INSULIN ASPART 5 UNITS: 100 INJECTION, SOLUTION INTRAVENOUS; SUBCUTANEOUS at 04:03

## 2023-01-01 RX ADMIN — INSULIN ASPART 2 UNITS: 100 INJECTION, SOLUTION INTRAVENOUS; SUBCUTANEOUS at 11:03

## 2023-01-01 RX ADMIN — SODIUM CHLORIDE: 9 INJECTION, SOLUTION INTRAVENOUS at 01:08

## 2023-01-01 RX ADMIN — INSULIN ASPART 3 UNITS: 100 INJECTION, SOLUTION INTRAVENOUS; SUBCUTANEOUS at 08:06

## 2023-01-01 RX ADMIN — CARVEDILOL 12.5 MG: 12.5 TABLET, FILM COATED ORAL at 09:08

## 2023-01-01 RX ADMIN — LISINOPRIL 40 MG: 20 TABLET ORAL at 07:10

## 2023-01-01 RX ADMIN — IPRATROPIUM BROMIDE AND ALBUTEROL SULFATE 3 ML: .5; 3 SOLUTION RESPIRATORY (INHALATION) at 07:08

## 2023-01-01 RX ADMIN — INSULIN ASPART 2 UNITS: 100 INJECTION, SOLUTION INTRAVENOUS; SUBCUTANEOUS at 09:10

## 2023-01-01 RX ADMIN — EPOETIN ALFA-EPBX 3510 UNITS: 3000 INJECTION, SOLUTION INTRAVENOUS; SUBCUTANEOUS at 10:10

## 2023-01-01 RX ADMIN — FLUOXETINE 40 MG: 20 CAPSULE ORAL at 08:06

## 2023-01-01 RX ADMIN — FLUOXETINE 40 MG: 20 CAPSULE ORAL at 08:01

## 2023-01-01 RX ADMIN — MINERAL OIL, PETROLATUM: 425; 573 OINTMENT OPHTHALMIC at 08:03

## 2023-01-01 RX ADMIN — ISOSORBIDE MONONITRATE 30 MG: 30 TABLET, EXTENDED RELEASE ORAL at 08:06

## 2023-01-01 RX ADMIN — CARVEDILOL 3.12 MG: 3.12 TABLET, FILM COATED ORAL at 08:07

## 2023-01-01 RX ADMIN — HYDRALAZINE HYDROCHLORIDE 100 MG: 50 TABLET ORAL at 06:03

## 2023-01-01 RX ADMIN — TIOTROPIUM BROMIDE INHALATION SPRAY 2 PUFF: 3.12 SPRAY, METERED RESPIRATORY (INHALATION) at 12:10

## 2023-01-01 RX ADMIN — NICARDIPINE HYDROCHLORIDE 5 MG/HR: 0.2 INJECTION, SOLUTION INTRAVENOUS at 02:03

## 2023-01-01 RX ADMIN — QUETIAPINE FUMARATE 25 MG: 25 TABLET ORAL at 01:08

## 2023-01-01 RX ADMIN — OXYMETAZOLINE HYDROCHLORIDE 2 SPRAY: 0.05 SPRAY NASAL at 01:08

## 2023-01-01 RX ADMIN — FLUTICASONE FUROATE AND VILANTEROL TRIFENATATE 1 PUFF: 100; 25 POWDER RESPIRATORY (INHALATION) at 09:06

## 2023-01-01 RX ADMIN — HYPROMELLOSE 2910 1 DROP: 5 SOLUTION/ DROPS OPHTHALMIC at 10:06

## 2023-01-01 RX ADMIN — LISINOPRIL 40 MG: 20 TABLET ORAL at 08:04

## 2023-01-01 RX ADMIN — LISINOPRIL 40 MG: 20 TABLET ORAL at 10:07

## 2023-01-01 RX ADMIN — INSULIN ASPART 3 UNITS: 100 INJECTION, SOLUTION INTRAVENOUS; SUBCUTANEOUS at 12:08

## 2023-01-01 RX ADMIN — SEVELAMER CARBONATE 800 MG: 800 TABLET, FILM COATED ORAL at 03:06

## 2023-01-01 RX ADMIN — Medication 6 MG: at 10:08

## 2023-01-01 RX ADMIN — HYDRALAZINE HYDROCHLORIDE 25 MG: 25 TABLET, FILM COATED ORAL at 08:03

## 2023-01-01 RX ADMIN — Medication 16 G: at 12:10

## 2023-01-01 RX ADMIN — TIOTROPIUM BROMIDE INHALATION SPRAY 2 PUFF: 3.12 SPRAY, METERED RESPIRATORY (INHALATION) at 09:04

## 2023-01-01 RX ADMIN — FLUOXETINE 40 MG: 20 CAPSULE ORAL at 09:06

## 2023-01-01 RX ADMIN — APIXABAN 2.5 MG: 2.5 TABLET, FILM COATED ORAL at 11:10

## 2023-01-01 RX ADMIN — ERYTHROMYCIN: 5 OINTMENT OPHTHALMIC at 09:06

## 2023-01-01 RX ADMIN — SODIUM CHLORIDE: 9 INJECTION, SOLUTION INTRAVENOUS at 08:10

## 2023-01-01 RX ADMIN — OXYCODONE HYDROCHLORIDE AND ACETAMINOPHEN 500 MG: 500 TABLET ORAL at 10:10

## 2023-01-01 RX ADMIN — ATORVASTATIN CALCIUM 40 MG: 40 TABLET, FILM COATED ORAL at 08:09

## 2023-01-01 RX ADMIN — ERYTHROMYCIN 1 INCH: 5 OINTMENT OPHTHALMIC at 03:06

## 2023-01-01 RX ADMIN — NASAL 1 SPRAY: 6.5 SPRAY NASAL at 02:08

## 2023-01-01 RX ADMIN — SODIUM ZIRCONIUM CYCLOSILICATE 5 G: 5 POWDER, FOR SUSPENSION ORAL at 06:10

## 2023-01-01 RX ADMIN — LISINOPRIL 40 MG: 20 TABLET ORAL at 08:06

## 2023-01-01 RX ADMIN — TIOTROPIUM BROMIDE INHALATION SPRAY 2 PUFF: 3.12 SPRAY, METERED RESPIRATORY (INHALATION) at 09:06

## 2023-01-01 RX ADMIN — Medication 0.04 MCG/KG/MIN: at 04:11

## 2023-01-01 RX ADMIN — CARVEDILOL 6.25 MG: 6.25 TABLET, FILM COATED ORAL at 08:08

## 2023-01-01 RX ADMIN — NITROGLYCERIN 1 INCH: 20 OINTMENT TOPICAL at 01:03

## 2023-01-01 RX ADMIN — FLUOXETINE 40 MG: 20 CAPSULE ORAL at 07:03

## 2023-01-01 RX ADMIN — CALCIUM GLUCONATE 1 G: 20 INJECTION, SOLUTION INTRAVENOUS at 05:11

## 2023-01-01 RX ADMIN — ATORVASTATIN CALCIUM 40 MG: 20 TABLET, FILM COATED ORAL at 09:03

## 2023-01-01 RX ADMIN — INSULIN ASPART 1 UNITS: 100 INJECTION, SOLUTION INTRAVENOUS; SUBCUTANEOUS at 08:03

## 2023-01-01 RX ADMIN — REMDESIVIR 100 MG: 100 INJECTION, POWDER, LYOPHILIZED, FOR SOLUTION INTRAVENOUS at 11:10

## 2023-01-01 RX ADMIN — NIFEDIPINE 90 MG: 30 TABLET, FILM COATED, EXTENDED RELEASE ORAL at 08:03

## 2023-01-01 RX ADMIN — MAGNESIUM OXIDE TAB 400 MG (241.3 MG ELEMENTAL MG) 400 MG: 400 (241.3 MG) TAB at 03:03

## 2023-01-01 RX ADMIN — HYPROMELLOSE 2910 1 DROP: 5 SOLUTION/ DROPS OPHTHALMIC at 02:06

## 2023-01-01 RX ADMIN — TIOTROPIUM BROMIDE INHALATION SPRAY 2 PUFF: 3.12 SPRAY, METERED RESPIRATORY (INHALATION) at 04:06

## 2023-01-01 RX ADMIN — CARVEDILOL 6.25 MG: 6.25 TABLET, FILM COATED ORAL at 12:06

## 2023-01-01 RX ADMIN — HYDRALAZINE HYDROCHLORIDE 10 MG: 20 INJECTION INTRAMUSCULAR; INTRAVENOUS at 10:07

## 2023-01-01 RX ADMIN — CARVEDILOL 3.12 MG: 3.12 TABLET, FILM COATED ORAL at 03:03

## 2023-01-01 RX ADMIN — CARVEDILOL 3.12 MG: 3.12 TABLET, FILM COATED ORAL at 08:08

## 2023-01-01 RX ADMIN — INSULIN ASPART 3 UNITS: 100 INJECTION, SOLUTION INTRAVENOUS; SUBCUTANEOUS at 04:10

## 2023-01-01 RX ADMIN — INSULIN ASPART 5 UNITS: 100 INJECTION, SOLUTION INTRAVENOUS; SUBCUTANEOUS at 05:03

## 2023-01-01 RX ADMIN — DEXAMETHASONE SODIUM PHOSPHATE 6 MG: 4 INJECTION INTRA-ARTICULAR; INTRALESIONAL; INTRAMUSCULAR; INTRAVENOUS; SOFT TISSUE at 08:10

## 2023-01-01 RX ADMIN — ROCURONIUM BROMIDE 70 MG: 10 INJECTION INTRAVENOUS at 11:10

## 2023-01-01 RX ADMIN — INSULIN DETEMIR 3 UNITS: 100 INJECTION, SOLUTION SUBCUTANEOUS at 11:10

## 2023-01-01 RX ADMIN — INSULIN ASPART 6 UNITS: 100 INJECTION, SOLUTION INTRAVENOUS; SUBCUTANEOUS at 06:06

## 2023-01-01 RX ADMIN — ERYTHROMYCIN: 5 OINTMENT OPHTHALMIC at 11:03

## 2023-01-01 RX ADMIN — Medication 6 MG: at 08:08

## 2023-01-01 RX ADMIN — CARVEDILOL 12.5 MG: 12.5 TABLET, FILM COATED ORAL at 11:10

## 2023-01-01 RX ADMIN — HALOPERIDOL LACTATE 5 MG: 5 INJECTION, SOLUTION INTRAMUSCULAR at 10:08

## 2023-01-01 RX ADMIN — Medication 2000 UNITS: at 02:11

## 2023-01-01 RX ADMIN — SODIUM CHLORIDE 250 ML: 0.9 INJECTION, SOLUTION INTRAVENOUS at 01:01

## 2023-01-01 RX ADMIN — INSULIN DETEMIR 4 UNITS: 100 INJECTION, SOLUTION SUBCUTANEOUS at 08:04

## 2023-01-01 RX ADMIN — NASAL 1 SPRAY: 6.5 SPRAY NASAL at 11:08

## 2023-01-01 RX ADMIN — ATORVASTATIN CALCIUM 40 MG: 40 TABLET, FILM COATED ORAL at 09:08

## 2023-01-01 RX ADMIN — MUPIROCIN: 20 OINTMENT TOPICAL at 08:06

## 2023-01-01 RX ADMIN — OXYMETAZOLINE HYDROCHLORIDE 2 SPRAY: 0.05 SPRAY NASAL at 06:08

## 2023-01-01 RX ADMIN — OXYMETAZOLINE HCL 1 SPRAY: 0.05 SPRAY NASAL at 09:03

## 2023-01-01 RX ADMIN — NIFEDIPINE 60 MG: 60 TABLET, FILM COATED, EXTENDED RELEASE ORAL at 08:09

## 2023-01-01 RX ADMIN — HYPROMELLOSE 2910 1 DROP: 5 SOLUTION/ DROPS OPHTHALMIC at 10:03

## 2023-01-01 RX ADMIN — FLUOXETINE 40 MG: 20 CAPSULE ORAL at 09:10

## 2023-01-01 RX ADMIN — FLUTICASONE FUROATE AND VILANTEROL TRIFENATATE 1 PUFF: 100; 25 POWDER RESPIRATORY (INHALATION) at 09:10

## 2023-01-01 RX ADMIN — PIPERACILLIN SODIUM AND TAZOBACTAM SODIUM 4.5 G: 4; .5 INJECTION, POWDER, FOR SOLUTION INTRAVENOUS at 05:11

## 2023-01-01 RX ADMIN — FLUOXETINE 40 MG: 20 CAPSULE ORAL at 12:02

## 2023-01-01 RX ADMIN — SEVELAMER CARBONATE 800 MG: 800 TABLET, FILM COATED ORAL at 04:06

## 2023-01-01 RX ADMIN — CARVEDILOL 12.5 MG: 12.5 TABLET, FILM COATED ORAL at 07:10

## 2023-01-01 RX ADMIN — DEXTROSE MONOHYDRATE: 100 INJECTION, SOLUTION INTRAVENOUS at 08:10

## 2023-01-01 RX ADMIN — NIFEDIPINE 60 MG: 30 TABLET, FILM COATED, EXTENDED RELEASE ORAL at 08:07

## 2023-01-01 RX ADMIN — INSULIN ASPART 5 UNITS: 100 INJECTION, SOLUTION INTRAVENOUS; SUBCUTANEOUS at 01:10

## 2023-01-01 RX ADMIN — TIOTROPIUM BROMIDE INHALATION SPRAY 2 PUFF: 3.12 SPRAY, METERED RESPIRATORY (INHALATION) at 07:03

## 2023-01-01 RX ADMIN — APIXABAN 2.5 MG: 2.5 TABLET, FILM COATED ORAL at 09:10

## 2023-01-01 RX ADMIN — INSULIN ASPART 2 UNITS: 100 INJECTION, SOLUTION INTRAVENOUS; SUBCUTANEOUS at 12:01

## 2023-01-01 RX ADMIN — CARVEDILOL 3.12 MG: 3.12 TABLET, FILM COATED ORAL at 12:08

## 2023-01-01 RX ADMIN — TIOTROPIUM BROMIDE INHALATION SPRAY 2 PUFF: 3.12 SPRAY, METERED RESPIRATORY (INHALATION) at 01:03

## 2023-01-01 RX ADMIN — IPRATROPIUM BROMIDE AND ALBUTEROL SULFATE 3 ML: 2.5; .5 SOLUTION RESPIRATORY (INHALATION) at 09:03

## 2023-01-01 RX ADMIN — CALCIUM GLUCONATE 1 G: 20 INJECTION, SOLUTION INTRAVENOUS at 04:11

## 2023-01-01 RX ADMIN — MAGNESIUM SULFATE HEPTAHYDRATE 2 G: 40 INJECTION, SOLUTION INTRAVENOUS at 08:10

## 2023-01-01 RX ADMIN — INSULIN HUMAN 4 UNITS: 100 INJECTION, SOLUTION PARENTERAL at 11:03

## 2023-01-01 RX ADMIN — ATORVASTATIN CALCIUM 40 MG: 20 TABLET, FILM COATED ORAL at 08:01

## 2023-01-01 RX ADMIN — INSULIN ASPART 6 UNITS: 100 INJECTION, SOLUTION INTRAVENOUS; SUBCUTANEOUS at 10:06

## 2023-01-01 RX ADMIN — ATORVASTATIN CALCIUM 40 MG: 40 TABLET, FILM COATED ORAL at 08:04

## 2023-01-01 RX ADMIN — CARVEDILOL 12.5 MG: 12.5 TABLET, FILM COATED ORAL at 04:10

## 2023-01-01 RX ADMIN — VANCOMYCIN HYDROCHLORIDE 500 MG: 500 INJECTION, POWDER, LYOPHILIZED, FOR SOLUTION INTRAVENOUS at 10:10

## 2023-01-01 RX ADMIN — NIFEDIPINE 90 MG: 60 TABLET, FILM COATED, EXTENDED RELEASE ORAL at 08:03

## 2023-01-01 RX ADMIN — FLUTICASONE FUROATE AND VILANTEROL TRIFENATATE 1 PUFF: 100; 25 POWDER RESPIRATORY (INHALATION) at 08:04

## 2023-01-01 RX ADMIN — INSULIN ASPART 2 UNITS: 100 INJECTION, SOLUTION INTRAVENOUS; SUBCUTANEOUS at 06:10

## 2023-01-01 RX ADMIN — MUPIROCIN: 20 OINTMENT TOPICAL at 10:11

## 2023-01-01 RX ADMIN — FUROSEMIDE 80 MG: 10 INJECTION, SOLUTION INTRAMUSCULAR; INTRAVENOUS at 12:06

## 2023-01-01 RX ADMIN — SEVELAMER CARBONATE 800 MG: 800 TABLET, FILM COATED ORAL at 02:08

## 2023-01-01 RX ADMIN — INSULIN ASPART 3 UNITS: 100 INJECTION, SOLUTION INTRAVENOUS; SUBCUTANEOUS at 07:10

## 2023-01-01 RX ADMIN — MUPIROCIN: 20 OINTMENT TOPICAL at 09:11

## 2023-01-01 RX ADMIN — FUROSEMIDE 120 MG: 10 INJECTION, SOLUTION INTRAVENOUS at 01:08

## 2023-01-01 RX ADMIN — SEVELAMER CARBONATE 800 MG: 800 TABLET, FILM COATED ORAL at 04:08

## 2023-01-01 RX ADMIN — INSULIN ASPART 5 UNITS: 100 INJECTION, SOLUTION INTRAVENOUS; SUBCUTANEOUS at 10:03

## 2023-01-01 RX ADMIN — Medication 16 G: at 08:10

## 2023-01-01 RX ADMIN — NASAL 1 SPRAY: 6.5 SPRAY NASAL at 05:08

## 2023-01-01 RX ADMIN — HEPARIN SODIUM 5000 UNITS: 5000 INJECTION INTRAVENOUS; SUBCUTANEOUS at 08:10

## 2023-01-01 RX ADMIN — FLUOXETINE 40 MG: 20 CAPSULE ORAL at 09:03

## 2023-01-01 RX ADMIN — Medication 6 MG: at 08:03

## 2023-01-01 RX ADMIN — POTASSIUM & SODIUM PHOSPHATES POWDER PACK 280-160-250 MG 1 PACKET: 280-160-250 PACK at 12:03

## 2023-01-01 RX ADMIN — POTASSIUM BICARBONATE 40 MEQ: 391 TABLET, EFFERVESCENT ORAL at 08:08

## 2023-01-01 RX ADMIN — ISOSORBIDE MONONITRATE 30 MG: 30 TABLET, EXTENDED RELEASE ORAL at 01:06

## 2023-01-01 RX ADMIN — INSULIN ASPART 5 UNITS: 100 INJECTION, SOLUTION INTRAVENOUS; SUBCUTANEOUS at 08:01

## 2023-01-01 RX ADMIN — CETIRIZINE HYDROCHLORIDE 5 MG: 5 TABLET, FILM COATED ORAL at 10:03

## 2023-01-01 RX ADMIN — ERYTHROMYCIN: 5 OINTMENT OPHTHALMIC at 09:03

## 2023-01-01 RX ADMIN — MAGNESIUM SULFATE 2 G: 2 INJECTION INTRAVENOUS at 06:03

## 2023-01-01 RX ADMIN — IPRATROPIUM BROMIDE AND ALBUTEROL SULFATE 3 ML: .5; 3 SOLUTION RESPIRATORY (INHALATION) at 07:06

## 2023-01-01 RX ADMIN — CARVEDILOL 6.25 MG: 6.25 TABLET, FILM COATED ORAL at 10:03

## 2023-01-01 RX ADMIN — INSULIN ASPART 4 UNITS: 100 INJECTION, SOLUTION INTRAVENOUS; SUBCUTANEOUS at 08:03

## 2023-01-01 RX ADMIN — INSULIN ASPART 8 UNITS: 100 INJECTION, SOLUTION INTRAVENOUS; SUBCUTANEOUS at 06:06

## 2023-01-01 RX ADMIN — SODIUM CHLORIDE: 9 INJECTION, SOLUTION INTRAVENOUS at 03:03

## 2023-01-01 RX ADMIN — ISOSORBIDE MONONITRATE 30 MG: 30 TABLET, EXTENDED RELEASE ORAL at 01:03

## 2023-01-01 RX ADMIN — HYDRALAZINE HYDROCHLORIDE 100 MG: 50 TABLET ORAL at 10:03

## 2023-01-01 RX ADMIN — ASPIRIN 81 MG: 81 TABLET, COATED ORAL at 09:06

## 2023-01-01 RX ADMIN — INSULIN ASPART 5 UNITS: 100 INJECTION, SOLUTION INTRAVENOUS; SUBCUTANEOUS at 12:04

## 2023-01-01 RX ADMIN — HYDRALAZINE HYDROCHLORIDE 100 MG: 50 TABLET ORAL at 12:03

## 2023-01-01 RX ADMIN — INSULIN DETEMIR 3 UNITS: 100 INJECTION, SOLUTION SUBCUTANEOUS at 09:06

## 2023-01-01 RX ADMIN — TIOTROPIUM BROMIDE INHALATION SPRAY 2 PUFF: 3.12 SPRAY, METERED RESPIRATORY (INHALATION) at 09:03

## 2023-01-01 RX ADMIN — NICARDIPINE HYDROCHLORIDE 3 MG/HR: 0.2 INJECTION, SOLUTION INTRAVENOUS at 09:03

## 2023-01-01 RX ADMIN — DEXAMETHASONE SODIUM PHOSPHATE 6 MG: 4 INJECTION INTRA-ARTICULAR; INTRALESIONAL; INTRAMUSCULAR; INTRAVENOUS; SOFT TISSUE at 10:10

## 2023-01-01 RX ADMIN — MUPIROCIN: 20 OINTMENT TOPICAL at 09:04

## 2023-01-01 RX ADMIN — HYPROMELLOSE 2910 1 DROP: 5 SOLUTION/ DROPS OPHTHALMIC at 12:06

## 2023-01-01 RX ADMIN — ATORVASTATIN CALCIUM 40 MG: 40 TABLET, FILM COATED ORAL at 09:04

## 2023-01-01 RX ADMIN — IOHEXOL 75 ML: 350 INJECTION, SOLUTION INTRAVENOUS at 01:08

## 2023-01-01 RX ADMIN — NEPHROCAP 1 CAPSULE: 1 CAP ORAL at 01:03

## 2023-01-01 RX ADMIN — FAMOTIDINE 20 MG: 20 TABLET ORAL at 09:10

## 2023-01-01 RX ADMIN — PROPOFOL 25 MCG/KG/MIN: 10 INJECTION, EMULSION INTRAVENOUS at 04:10

## 2023-01-01 RX ADMIN — INSULIN DETEMIR 15 UNITS: 100 INJECTION, SOLUTION SUBCUTANEOUS at 08:03

## 2023-01-01 RX ADMIN — Medication 10 MG: at 06:08

## 2023-01-01 RX ADMIN — CARVEDILOL 6.25 MG: 6.25 TABLET, FILM COATED ORAL at 10:08

## 2023-01-01 RX ADMIN — TIOTROPIUM BROMIDE INHALATION SPRAY 2 PUFF: 3.12 SPRAY, METERED RESPIRATORY (INHALATION) at 09:10

## 2023-01-01 RX ADMIN — ONDANSETRON 4 MG: 2 INJECTION INTRAMUSCULAR; INTRAVENOUS at 01:08

## 2023-01-01 RX ADMIN — ISOSORBIDE MONONITRATE 30 MG: 30 TABLET, EXTENDED RELEASE ORAL at 08:02

## 2023-01-01 RX ADMIN — INSULIN ASPART 4 UNITS: 100 INJECTION, SOLUTION INTRAVENOUS; SUBCUTANEOUS at 04:02

## 2023-01-01 RX ADMIN — CLONIDINE 1 PATCH: 0.3 PATCH TRANSDERMAL at 12:03

## 2023-01-01 RX ADMIN — DOXYCYCLINE HYCLATE 100 MG: 100 TABLET, COATED ORAL at 08:03

## 2023-01-01 RX ADMIN — INSULIN ASPART 5 UNITS: 100 INJECTION, SOLUTION INTRAVENOUS; SUBCUTANEOUS at 01:03

## 2023-01-01 RX ADMIN — MUPIROCIN: 20 OINTMENT TOPICAL at 09:06

## 2023-01-01 RX ADMIN — NIFEDIPINE 90 MG: 30 TABLET, FILM COATED, EXTENDED RELEASE ORAL at 12:02

## 2023-01-01 RX ADMIN — ATORVASTATIN CALCIUM 40 MG: 20 TABLET, FILM COATED ORAL at 08:03

## 2023-01-01 RX ADMIN — LISINOPRIL 40 MG: 20 TABLET ORAL at 10:03

## 2023-01-01 RX ADMIN — MINERAL OIL AND WHITE PETROLATUM: 30; 940 OINTMENT OPHTHALMIC at 10:11

## 2023-01-01 RX ADMIN — HALOPERIDOL LACTATE 5 MG: 5 INJECTION, SOLUTION INTRAMUSCULAR at 03:10

## 2023-01-01 RX ADMIN — NASAL 1 SPRAY: 6.5 SPRAY NASAL at 04:08

## 2023-01-01 RX ADMIN — INSULIN DETEMIR 7 UNITS: 100 INJECTION, SOLUTION SUBCUTANEOUS at 03:03

## 2023-01-01 RX ADMIN — PANTOPRAZOLE SODIUM 40 MG: 40 INJECTION, POWDER, FOR SOLUTION INTRAVENOUS at 08:11

## 2023-01-01 RX ADMIN — INSULIN HUMAN 0.1 UNITS/KG/HR: 1 INJECTION, SOLUTION INTRAVENOUS at 02:03

## 2023-01-01 RX ADMIN — FLUTICASONE PROPIONATE AND SALMETEROL 1 PUFF: 50; 250 POWDER RESPIRATORY (INHALATION) at 09:02

## 2023-01-01 RX ADMIN — HYDROCORTISONE SODIUM SUCCINATE 100 MG: 100 INJECTION, POWDER, FOR SOLUTION INTRAMUSCULAR; INTRAVENOUS at 01:11

## 2023-01-01 RX ADMIN — QUETIAPINE FUMARATE 25 MG: 25 TABLET ORAL at 09:08

## 2023-01-01 RX ADMIN — THERA TABS 1 TABLET: TAB at 10:10

## 2023-01-01 RX ADMIN — IPRATROPIUM BROMIDE AND ALBUTEROL SULFATE 3 ML: .5; 3 SOLUTION RESPIRATORY (INHALATION) at 01:10

## 2023-01-01 RX ADMIN — INSULIN ASPART 1 UNITS: 100 INJECTION, SOLUTION INTRAVENOUS; SUBCUTANEOUS at 01:10

## 2023-01-01 RX ADMIN — DEXTROSE MONOHYDRATE 125 ML: 100 INJECTION, SOLUTION INTRAVENOUS at 06:08

## 2023-01-01 RX ADMIN — ERYTHROPOIETIN 7000 UNITS: 10000 INJECTION, SOLUTION INTRAVENOUS; SUBCUTANEOUS at 05:03

## 2023-01-01 RX ADMIN — INSULIN DETEMIR 2 UNITS: 100 INJECTION, SOLUTION SUBCUTANEOUS at 08:10

## 2023-01-01 RX ADMIN — TIOTROPIUM BROMIDE INHALATION SPRAY 2 PUFF: 3.12 SPRAY, METERED RESPIRATORY (INHALATION) at 12:08

## 2023-01-01 RX ADMIN — SEVELAMER CARBONATE 800 MG: 800 TABLET, FILM COATED ORAL at 05:08

## 2023-01-01 RX ADMIN — AMOXICILLIN AND CLAVULANATE POTASSIUM 500 MG: 500; 125 TABLET, FILM COATED ORAL at 09:06

## 2023-01-01 RX ADMIN — INSULIN HUMAN 0.1 UNITS/KG/HR: 1 INJECTION, SOLUTION INTRAVENOUS at 01:03

## 2023-01-01 RX ADMIN — DEXTROSE MONOHYDRATE: 100 INJECTION, SOLUTION INTRAVENOUS at 06:11

## 2023-01-01 RX ADMIN — SEVELAMER CARBONATE 800 MG: 800 TABLET, FILM COATED ORAL at 03:08

## 2023-01-01 RX ADMIN — INSULIN ASPART 8 UNITS: 100 INJECTION, SOLUTION INTRAVENOUS; SUBCUTANEOUS at 05:10

## 2023-01-01 RX ADMIN — INSULIN ASPART 3 UNITS: 100 INJECTION, SOLUTION INTRAVENOUS; SUBCUTANEOUS at 10:08

## 2023-01-01 RX ADMIN — DEXTROSE AND SODIUM CHLORIDE: 5; 900 INJECTION, SOLUTION INTRAVENOUS at 02:10

## 2023-01-01 RX ADMIN — DEXAMETHASONE SODIUM PHOSPHATE 6 MG: 4 INJECTION INTRA-ARTICULAR; INTRALESIONAL; INTRAMUSCULAR; INTRAVENOUS; SOFT TISSUE at 12:10

## 2023-01-01 RX ADMIN — INSULIN ASPART 4 UNITS: 100 INJECTION, SOLUTION INTRAVENOUS; SUBCUTANEOUS at 11:03

## 2023-01-01 RX ADMIN — POTASSIUM & SODIUM PHOSPHATES POWDER PACK 280-160-250 MG 1 PACKET: 280-160-250 PACK at 03:03

## 2023-01-01 RX ADMIN — DEXTROSE MONOHYDRATE 125 ML: 100 INJECTION, SOLUTION INTRAVENOUS at 12:10

## 2023-01-01 RX ADMIN — APIXABAN 5 MG: 5 TABLET, FILM COATED ORAL at 08:09

## 2023-01-01 RX ADMIN — ALBUTEROL SULFATE 2.5 MG: 2.5 SOLUTION RESPIRATORY (INHALATION) at 12:02

## 2023-01-01 RX ADMIN — TIOTROPIUM BROMIDE INHALATION SPRAY 2 PUFF: 3.12 SPRAY, METERED RESPIRATORY (INHALATION) at 06:03

## 2023-01-01 RX ADMIN — CARVEDILOL 6.25 MG: 6.25 TABLET, FILM COATED ORAL at 09:01

## 2023-01-01 RX ADMIN — INSULIN ASPART 4 UNITS: 100 INJECTION, SOLUTION INTRAVENOUS; SUBCUTANEOUS at 10:06

## 2023-01-01 RX ADMIN — LORAZEPAM 1 MG: 2 INJECTION INTRAMUSCULAR; INTRAVENOUS at 04:11

## 2023-01-01 RX ADMIN — ENALAPRILAT 0.62 MG: 2.5 INJECTION INTRAVENOUS at 12:03

## 2023-01-01 RX ADMIN — PANTOPRAZOLE SODIUM 40 MG: 40 INJECTION, POWDER, FOR SOLUTION INTRAVENOUS at 11:11

## 2023-01-01 RX ADMIN — Medication 400 MG: at 12:03

## 2023-01-01 RX ADMIN — LISINOPRIL 40 MG: 20 TABLET ORAL at 09:08

## 2023-01-01 RX ADMIN — Medication 9 MG: at 12:03

## 2023-01-01 RX ADMIN — SODIUM CHLORIDE, SODIUM GLUCONATE, SODIUM ACETATE, POTASSIUM CHLORIDE, MAGNESIUM CHLORIDE, SODIUM PHOSPHATE, DIBASIC, AND POTASSIUM PHOSPHATE: .53; .5; .37; .037; .03; .012; .00082 INJECTION, SOLUTION INTRAVENOUS at 01:08

## 2023-01-01 RX ADMIN — HYPROMELLOSE 2910 1 DROP: 5 SOLUTION/ DROPS OPHTHALMIC at 05:11

## 2023-01-01 RX ADMIN — LISINOPRIL 40 MG: 20 TABLET ORAL at 08:10

## 2023-01-01 RX ADMIN — MUPIROCIN: 20 OINTMENT TOPICAL at 08:11

## 2023-01-01 RX ADMIN — IOHEXOL 30 ML: 350 INJECTION, SOLUTION INTRAVENOUS at 01:07

## 2023-01-01 RX ADMIN — Medication 100 MG: at 11:10

## 2023-01-01 RX ADMIN — ASPIRIN 81 MG: 81 TABLET, COATED ORAL at 09:08

## 2023-01-01 RX ADMIN — HYPROMELLOSE 2910 1 DROP: 5 SOLUTION/ DROPS OPHTHALMIC at 10:11

## 2023-01-01 RX ADMIN — APIXABAN 5 MG: 5 TABLET, FILM COATED ORAL at 09:10

## 2023-01-01 RX ADMIN — LABETALOL HYDROCHLORIDE 10 MG: 5 INJECTION, SOLUTION INTRAVENOUS at 05:03

## 2023-01-01 RX ADMIN — INSULIN ASPART 2 UNITS: 100 INJECTION, SOLUTION INTRAVENOUS; SUBCUTANEOUS at 07:08

## 2023-01-01 RX ADMIN — TIOTROPIUM BROMIDE INHALATION SPRAY 2 PUFF: 3.12 SPRAY, METERED RESPIRATORY (INHALATION) at 10:04

## 2023-01-01 RX ADMIN — SODIUM CHLORIDE: 9 INJECTION, SOLUTION INTRAVENOUS at 07:06

## 2023-01-01 RX ADMIN — ATORVASTATIN CALCIUM 40 MG: 40 TABLET, FILM COATED ORAL at 07:03

## 2023-01-01 RX ADMIN — APIXABAN 5 MG: 5 TABLET, FILM COATED ORAL at 09:06

## 2023-01-01 RX ADMIN — INSULIN DETEMIR 5 UNITS: 100 INJECTION, SOLUTION SUBCUTANEOUS at 08:03

## 2023-01-01 RX ADMIN — APIXABAN 2.5 MG: 2.5 TABLET, FILM COATED ORAL at 10:10

## 2023-01-01 RX ADMIN — MUPIROCIN: 20 OINTMENT TOPICAL at 10:08

## 2023-01-01 RX ADMIN — DEXTROSE 15000 MG: 15 GEL ORAL at 12:06

## 2023-01-01 RX ADMIN — ISOSORBIDE MONONITRATE 30 MG: 30 TABLET, EXTENDED RELEASE ORAL at 08:09

## 2023-01-01 RX ADMIN — HYDROCORTISONE SODIUM SUCCINATE 100 MG: 100 INJECTION, POWDER, FOR SOLUTION INTRAMUSCULAR; INTRAVENOUS at 05:10

## 2023-01-01 RX ADMIN — LISINOPRIL 40 MG: 20 TABLET ORAL at 09:06

## 2023-01-01 RX ADMIN — DEXTROSE MONOHYDRATE 125 ML: 100 INJECTION, SOLUTION INTRAVENOUS at 04:03

## 2023-01-01 RX ADMIN — FLUTICASONE FUROATE AND VILANTEROL TRIFENATATE 1 PUFF: 100; 25 POWDER RESPIRATORY (INHALATION) at 08:08

## 2023-01-01 RX ADMIN — NIFEDIPINE 60 MG: 30 TABLET, FILM COATED, EXTENDED RELEASE ORAL at 01:06

## 2023-01-01 RX ADMIN — HYDRALAZINE HYDROCHLORIDE 100 MG: 50 TABLET ORAL at 05:01

## 2023-01-01 RX ADMIN — ASPIRIN 81 MG: 81 TABLET, COATED ORAL at 08:02

## 2023-01-01 RX ADMIN — NIFEDIPINE 90 MG: 30 TABLET, FILM COATED, EXTENDED RELEASE ORAL at 05:03

## 2023-01-01 RX ADMIN — DROPERIDOL 1.25 MG: 2.5 INJECTION, SOLUTION INTRAMUSCULAR; INTRAVENOUS at 06:10

## 2023-01-01 RX ADMIN — INSULIN ASPART 6 UNITS: 100 INJECTION, SOLUTION INTRAVENOUS; SUBCUTANEOUS at 08:03

## 2023-01-01 RX ADMIN — NIFEDIPINE 60 MG: 30 TABLET, FILM COATED, EXTENDED RELEASE ORAL at 10:10

## 2023-01-01 RX ADMIN — HYDROXYZINE PAMOATE 25 MG: 25 CAPSULE ORAL at 11:08

## 2023-01-01 RX ADMIN — Medication 9 MG: at 10:10

## 2023-01-01 RX ADMIN — ERYTHROMYCIN: 5 OINTMENT OPHTHALMIC at 02:06

## 2023-01-01 RX ADMIN — FLUTICASONE FUROATE AND VILANTEROL TRIFENATATE 1 PUFF: 100; 25 POWDER RESPIRATORY (INHALATION) at 11:10

## 2023-01-01 RX ADMIN — INSULIN ASPART 2 UNITS: 100 INJECTION, SOLUTION INTRAVENOUS; SUBCUTANEOUS at 04:06

## 2023-01-01 RX ADMIN — CARVEDILOL 6.25 MG: 6.25 TABLET, FILM COATED ORAL at 07:03

## 2023-01-01 RX ADMIN — SEVELAMER CARBONATE 800 MG: 800 TABLET, FILM COATED ORAL at 02:06

## 2023-01-01 RX ADMIN — INSULIN ASPART 8 UNITS: 100 INJECTION, SOLUTION INTRAVENOUS; SUBCUTANEOUS at 09:06

## 2023-01-01 RX ADMIN — INSULIN ASPART 5 UNITS: 100 INJECTION, SOLUTION INTRAVENOUS; SUBCUTANEOUS at 12:01

## 2023-01-01 RX ADMIN — INSULIN ASPART 4 UNITS: 100 INJECTION, SOLUTION INTRAVENOUS; SUBCUTANEOUS at 04:08

## 2023-01-01 RX ADMIN — INSULIN ASPART 10 UNITS: 100 INJECTION, SOLUTION INTRAVENOUS; SUBCUTANEOUS at 12:06

## 2023-01-01 RX ADMIN — SEVELAMER CARBONATE 800 MG: 800 TABLET, FILM COATED ORAL at 08:09

## 2023-01-01 RX ADMIN — IPRATROPIUM BROMIDE AND ALBUTEROL SULFATE 3 ML: 2.5; .5 SOLUTION RESPIRATORY (INHALATION) at 03:02

## 2023-01-01 RX ADMIN — DAPTOMYCIN 530 MG: 350 INJECTION, POWDER, LYOPHILIZED, FOR SOLUTION INTRAVENOUS at 05:11

## 2023-01-01 RX ADMIN — APIXABAN 2.5 MG: 2.5 TABLET, FILM COATED ORAL at 01:10

## 2023-01-01 RX ADMIN — DOXYCYCLINE 100 MG: 100 INJECTION, POWDER, LYOPHILIZED, FOR SOLUTION INTRAVENOUS at 02:10

## 2023-01-01 RX ADMIN — SEVELAMER CARBONATE 800 MG: 800 TABLET, FILM COATED ORAL at 04:09

## 2023-01-01 RX ADMIN — HYDROCORTISONE SODIUM SUCCINATE 100 MG: 100 INJECTION, POWDER, FOR SOLUTION INTRAMUSCULAR; INTRAVENOUS at 05:11

## 2023-01-01 RX ADMIN — HYDROCORTISONE SODIUM SUCCINATE 100 MG: 100 INJECTION, POWDER, FOR SOLUTION INTRAMUSCULAR; INTRAVENOUS at 09:11

## 2023-01-01 RX ADMIN — HYPROMELLOSE 2910 1 DROP: 5 SOLUTION/ DROPS OPHTHALMIC at 02:03

## 2023-01-01 RX ADMIN — INSULIN ASPART 6 UNITS: 100 INJECTION, SOLUTION INTRAVENOUS; SUBCUTANEOUS at 11:03

## 2023-01-01 RX ADMIN — TIOTROPIUM BROMIDE INHALATION SPRAY 2 PUFF: 3.12 SPRAY, METERED RESPIRATORY (INHALATION) at 11:03

## 2023-01-01 RX ADMIN — INSULIN ASPART 2 UNITS: 100 INJECTION, SOLUTION INTRAVENOUS; SUBCUTANEOUS at 08:04

## 2023-01-01 RX ADMIN — APIXABAN 5 MG: 5 TABLET, FILM COATED ORAL at 08:02

## 2023-01-01 RX ADMIN — TIOTROPIUM BROMIDE INHALATION SPRAY 2 PUFF: 3.12 SPRAY, METERED RESPIRATORY (INHALATION) at 10:01

## 2023-01-01 RX ADMIN — INSULIN ASPART 6 UNITS: 100 INJECTION, SOLUTION INTRAVENOUS; SUBCUTANEOUS at 10:08

## 2023-01-01 RX ADMIN — INSULIN ASPART 2 UNITS: 100 INJECTION, SOLUTION INTRAVENOUS; SUBCUTANEOUS at 10:08

## 2023-01-01 RX ADMIN — ISOSORBIDE MONONITRATE 30 MG: 30 TABLET, EXTENDED RELEASE ORAL at 03:03

## 2023-01-01 RX ADMIN — CARVEDILOL 6.25 MG: 6.25 TABLET, FILM COATED ORAL at 09:04

## 2023-01-01 RX ADMIN — FAMOTIDINE 20 MG: 10 INJECTION, SOLUTION INTRAVENOUS at 10:10

## 2023-01-01 RX ADMIN — CLONIDINE 1 PATCH: 0.2 PATCH TRANSDERMAL at 05:03

## 2023-01-01 RX ADMIN — INSULIN ASPART 5 UNITS: 100 INJECTION, SOLUTION INTRAVENOUS; SUBCUTANEOUS at 05:01

## 2023-01-01 RX ADMIN — APIXABAN 5 MG: 5 TABLET, FILM COATED ORAL at 10:09

## 2023-01-01 RX ADMIN — INSULIN ASPART 2 UNITS: 100 INJECTION, SOLUTION INTRAVENOUS; SUBCUTANEOUS at 12:10

## 2023-01-01 RX ADMIN — HYDRALAZINE HYDROCHLORIDE 20 MG: 20 INJECTION, SOLUTION INTRAMUSCULAR; INTRAVENOUS at 04:03

## 2023-01-01 RX ADMIN — CEFTRIAXONE 1 G: 1 INJECTION, POWDER, FOR SOLUTION INTRAMUSCULAR; INTRAVENOUS at 08:03

## 2023-01-01 RX ADMIN — CARVEDILOL 6.25 MG: 6.25 TABLET, FILM COATED ORAL at 10:06

## 2023-01-01 RX ADMIN — NIFEDIPINE 60 MG: 60 TABLET, FILM COATED, EXTENDED RELEASE ORAL at 10:08

## 2023-01-01 RX ADMIN — FLUOXETINE 40 MG: 20 CAPSULE ORAL at 10:03

## 2023-01-01 RX ADMIN — DEXTROSE MONOHYDRATE: 100 INJECTION, SOLUTION INTRAVENOUS at 04:11

## 2023-01-01 RX ADMIN — SEVELAMER CARBONATE 800 MG: 800 TABLET, FILM COATED ORAL at 10:06

## 2023-01-01 RX ADMIN — FLUTICASONE FUROATE AND VILANTEROL TRIFENATATE 1 PUFF: 100; 25 POWDER RESPIRATORY (INHALATION) at 10:03

## 2023-01-01 RX ADMIN — APIXABAN 5 MG: 5 TABLET, FILM COATED ORAL at 08:07

## 2023-01-01 RX ADMIN — INSULIN ASPART 1 UNITS: 100 INJECTION, SOLUTION INTRAVENOUS; SUBCUTANEOUS at 12:08

## 2023-01-01 RX ADMIN — INSULIN ASPART 2 UNITS: 100 INJECTION, SOLUTION INTRAVENOUS; SUBCUTANEOUS at 09:03

## 2023-01-01 RX ADMIN — VANCOMYCIN HYDROCHLORIDE 1000 MG: 1 INJECTION, POWDER, LYOPHILIZED, FOR SOLUTION INTRAVENOUS at 05:03

## 2023-01-01 RX ADMIN — DOXYCYCLINE HYCLATE 100 MG: 100 TABLET, COATED ORAL at 10:03

## 2023-01-01 RX ADMIN — DOXYCYCLINE HYCLATE 100 MG: 100 TABLET, COATED ORAL at 09:03

## 2023-01-01 RX ADMIN — MAGNESIUM OXIDE TAB 400 MG (241.3 MG ELEMENTAL MG) 400 MG: 400 (241.3 MG) TAB at 05:03

## 2023-01-01 RX ADMIN — PROPOFOL 100 MG: 10 INJECTION, EMULSION INTRAVENOUS at 11:10

## 2023-01-01 RX ADMIN — INSULIN ASPART 2 UNITS: 100 INJECTION, SOLUTION INTRAVENOUS; SUBCUTANEOUS at 04:10

## 2023-01-01 RX ADMIN — INSULIN ASPART 3 UNITS: 100 INJECTION, SOLUTION INTRAVENOUS; SUBCUTANEOUS at 05:03

## 2023-01-01 RX ADMIN — APIXABAN 5 MG: 5 TABLET, FILM COATED ORAL at 01:03

## 2023-01-01 RX ADMIN — INSULIN ASPART 6 UNITS: 100 INJECTION, SOLUTION INTRAVENOUS; SUBCUTANEOUS at 12:06

## 2023-01-01 RX ADMIN — QUETIAPINE FUMARATE 25 MG: 25 TABLET ORAL at 08:08

## 2023-01-01 RX ADMIN — INSULIN ASPART 4 UNITS: 100 INJECTION, SOLUTION INTRAVENOUS; SUBCUTANEOUS at 09:06

## 2023-01-01 RX ADMIN — INSULIN ASPART 4 UNITS: 100 INJECTION, SOLUTION INTRAVENOUS; SUBCUTANEOUS at 01:10

## 2023-01-01 RX ADMIN — ERYTHROMYCIN 1 INCH: 5 OINTMENT OPHTHALMIC at 09:06

## 2023-01-01 RX ADMIN — DEXMEDETOMIDINE HYDROCHLORIDE 1 MCG/KG/HR: 4 INJECTION INTRAVENOUS at 10:10

## 2023-01-01 RX ADMIN — SODIUM CHLORIDE: 9 INJECTION, SOLUTION INTRAVENOUS at 09:09

## 2023-01-01 RX ADMIN — HYDROCORTISONE SODIUM SUCCINATE 100 MG: 100 INJECTION, POWDER, FOR SOLUTION INTRAMUSCULAR; INTRAVENOUS at 06:10

## 2023-01-01 RX ADMIN — ALBUMIN (HUMAN) 25 G: 12.5 SOLUTION INTRAVENOUS at 09:08

## 2023-01-01 RX ADMIN — ASPIRIN 81 MG: 81 TABLET, COATED ORAL at 08:01

## 2023-01-01 RX ADMIN — INSULIN ASPART 5 UNITS: 100 INJECTION, SOLUTION INTRAVENOUS; SUBCUTANEOUS at 11:03

## 2023-01-01 RX ADMIN — ENALAPRILAT 0.62 MG: 2.5 INJECTION INTRAVENOUS at 01:03

## 2023-01-01 RX ADMIN — FUROSEMIDE 80 MG: 10 INJECTION, SOLUTION INTRAMUSCULAR; INTRAVENOUS at 06:02

## 2023-01-01 RX ADMIN — INSULIN ASPART 6 UNITS: 100 INJECTION, SOLUTION INTRAVENOUS; SUBCUTANEOUS at 05:08

## 2023-01-01 RX ADMIN — CEFTRIAXONE 1 G: 1 INJECTION, POWDER, FOR SOLUTION INTRAMUSCULAR; INTRAVENOUS at 02:10

## 2023-01-01 RX ADMIN — INSULIN HUMAN 6 UNITS: 100 INJECTION, SOLUTION PARENTERAL at 01:08

## 2023-01-01 RX ADMIN — SODIUM CHLORIDE: 9 INJECTION, SOLUTION INTRAVENOUS at 12:09

## 2023-01-01 RX ADMIN — INSULIN ASPART 5 UNITS: 100 INJECTION, SOLUTION INTRAVENOUS; SUBCUTANEOUS at 05:04

## 2023-01-01 RX ADMIN — CARVEDILOL 12.5 MG: 12.5 TABLET, FILM COATED ORAL at 12:10

## 2023-01-01 RX ADMIN — INSULIN ASPART 4 UNITS: 100 INJECTION, SOLUTION INTRAVENOUS; SUBCUTANEOUS at 05:10

## 2023-01-01 RX ADMIN — MAGNESIUM SULFATE 2 G: 2 INJECTION INTRAVENOUS at 05:11

## 2023-01-01 RX ADMIN — ASPIRIN 81 MG: 81 TABLET, CHEWABLE ORAL at 09:03

## 2023-01-01 RX ADMIN — IPRATROPIUM BROMIDE AND ALBUTEROL SULFATE 3 ML: 2.5; .5 SOLUTION RESPIRATORY (INHALATION) at 08:03

## 2023-01-01 RX ADMIN — PREDNISONE 60 MG: 20 TABLET ORAL at 09:03

## 2023-01-01 RX ADMIN — HYPROMELLOSE 2910 1 DROP: 5 SOLUTION/ DROPS OPHTHALMIC at 08:11

## 2023-01-01 RX ADMIN — INSULIN ASPART 3 UNITS: 100 INJECTION, SOLUTION INTRAVENOUS; SUBCUTANEOUS at 08:02

## 2023-01-01 RX ADMIN — WHITE PETROLATUM: 1.75 OINTMENT TOPICAL at 09:08

## 2023-01-01 RX ADMIN — HYPROMELLOSE 2910 1 DROP: 5 SOLUTION/ DROPS OPHTHALMIC at 11:06

## 2023-01-01 RX ADMIN — SODIUM CHLORIDE: 9 INJECTION, SOLUTION INTRAVENOUS at 01:01

## 2023-01-01 RX ADMIN — INSULIN DETEMIR 5 UNITS: 100 INJECTION, SOLUTION SUBCUTANEOUS at 09:02

## 2023-01-01 RX ADMIN — INSULIN ASPART 6 UNITS: 100 INJECTION, SOLUTION INTRAVENOUS; SUBCUTANEOUS at 12:03

## 2023-01-01 RX ADMIN — OXYMETAZOLINE HYDROCHLORIDE 2 SPRAY: 0.05 SPRAY NASAL at 02:08

## 2023-01-01 RX ADMIN — LISINOPRIL 20 MG: 20 TABLET ORAL at 09:03

## 2023-01-01 RX ADMIN — REMDESIVIR 200 MG: 100 INJECTION, POWDER, LYOPHILIZED, FOR SOLUTION INTRAVENOUS at 06:10

## 2023-01-01 RX ADMIN — NIFEDIPINE 60 MG: 30 TABLET, FILM COATED, EXTENDED RELEASE ORAL at 12:06

## 2023-01-01 RX ADMIN — ERYTHROMYCIN: 5 OINTMENT OPHTHALMIC at 05:03

## 2023-01-01 RX ADMIN — INSULIN ASPART 8 UNITS: 100 INJECTION, SOLUTION INTRAVENOUS; SUBCUTANEOUS at 01:10

## 2023-01-01 RX ADMIN — INSULIN ASPART 5 UNITS: 100 INJECTION, SOLUTION INTRAVENOUS; SUBCUTANEOUS at 10:10

## 2023-01-01 RX ADMIN — INSULIN ASPART 3 UNITS: 100 INJECTION, SOLUTION INTRAVENOUS; SUBCUTANEOUS at 08:04

## 2023-01-01 RX ADMIN — DEXAMETHASONE SODIUM PHOSPHATE 6 MG: 4 INJECTION INTRA-ARTICULAR; INTRALESIONAL; INTRAMUSCULAR; INTRAVENOUS; SOFT TISSUE at 09:10

## 2023-01-01 RX ADMIN — SEVELAMER CARBONATE 800 MG: 800 TABLET, FILM COATED ORAL at 05:06

## 2023-01-01 RX ADMIN — FLUOXETINE 40 MG: 20 CAPSULE ORAL at 03:03

## 2023-01-01 RX ADMIN — INSULIN DETEMIR 8 UNITS: 100 INJECTION, SOLUTION SUBCUTANEOUS at 09:04

## 2023-01-01 RX ADMIN — INSULIN ASPART 4 UNITS: 100 INJECTION, SOLUTION INTRAVENOUS; SUBCUTANEOUS at 07:06

## 2023-01-01 RX ADMIN — Medication 25 MCG/HR: at 12:10

## 2023-01-01 RX ADMIN — REMDESIVIR 100 MG: 100 INJECTION, POWDER, LYOPHILIZED, FOR SOLUTION INTRAVENOUS at 08:10

## 2023-01-01 RX ADMIN — NIFEDIPINE 90 MG: 60 TABLET, FILM COATED, EXTENDED RELEASE ORAL at 09:03

## 2023-01-01 RX ADMIN — PANTOPRAZOLE SODIUM 40 MG: 40 INJECTION, POWDER, FOR SOLUTION INTRAVENOUS at 10:11

## 2023-01-01 RX ADMIN — Medication 24 G: at 05:10

## 2023-01-01 RX ADMIN — TIOTROPIUM BROMIDE INHALATION SPRAY 2 PUFF: 3.12 SPRAY, METERED RESPIRATORY (INHALATION) at 05:06

## 2023-01-01 RX ADMIN — INSULIN ASPART 4 UNITS: 100 INJECTION, SOLUTION INTRAVENOUS; SUBCUTANEOUS at 12:10

## 2023-01-01 RX ADMIN — PANTOPRAZOLE SODIUM 80 MG: 40 INJECTION, POWDER, FOR SOLUTION INTRAVENOUS at 02:11

## 2023-01-01 RX ADMIN — MUPIROCIN: 20 OINTMENT TOPICAL at 08:10

## 2023-01-01 RX ADMIN — DEXTROSE MONOHYDRATE 250 ML: 100 INJECTION, SOLUTION INTRAVENOUS at 06:03

## 2023-01-01 RX ADMIN — SODIUM PHOSPHATE, MONOBASIC, MONOHYDRATE AND SODIUM PHOSPHATE, DIBASIC, ANHYDROUS 15 MMOL: 142; 276 INJECTION, SOLUTION INTRAVENOUS at 11:11

## 2023-01-01 RX ADMIN — LABETALOL HYDROCHLORIDE 20 MG: 5 INJECTION, SOLUTION INTRAVENOUS at 06:03

## 2023-01-01 RX ADMIN — ASPIRIN 81 MG: 81 TABLET, COATED ORAL at 10:08

## 2023-01-01 RX ADMIN — DEXTROSE MONOHYDRATE 12.5 G: 25 INJECTION, SOLUTION INTRAVENOUS at 03:07

## 2023-01-01 RX ADMIN — HYPROMELLOSE 2910 1 DROP: 5 SOLUTION/ DROPS OPHTHALMIC at 04:11

## 2023-01-01 RX ADMIN — DEXTROSE: 20 INJECTION, SOLUTION INTRAVENOUS at 09:11

## 2023-01-01 RX ADMIN — TIOTROPIUM BROMIDE INHALATION SPRAY 2 PUFF: 3.12 SPRAY, METERED RESPIRATORY (INHALATION) at 08:08

## 2023-01-01 RX ADMIN — PIPERACILLIN SODIUM AND TAZOBACTAM SODIUM 4.5 G: 4; .5 INJECTION, POWDER, FOR SOLUTION INTRAVENOUS at 02:10

## 2023-01-01 RX ADMIN — ERYTHROMYCIN 1 INCH: 5 OINTMENT OPHTHALMIC at 05:03

## 2023-01-01 RX ADMIN — IPRATROPIUM BROMIDE AND ALBUTEROL SULFATE 3 ML: .5; 3 SOLUTION RESPIRATORY (INHALATION) at 07:10

## 2023-01-01 RX ADMIN — FENTANYL CITRATE 25 MCG: 50 INJECTION, SOLUTION INTRAMUSCULAR; INTRAVENOUS at 01:07

## 2023-01-01 RX ADMIN — INSULIN ASPART 10 UNITS: 100 INJECTION, SOLUTION INTRAVENOUS; SUBCUTANEOUS at 01:03

## 2023-01-01 RX ADMIN — NOREPINEPHRINE BITARTRATE 0.06 MCG/KG/MIN: 8 INJECTION, SOLUTION INTRAVENOUS at 05:11

## 2023-01-01 RX ADMIN — INSULIN ASPART 1 UNITS: 100 INJECTION, SOLUTION INTRAVENOUS; SUBCUTANEOUS at 09:03

## 2023-01-01 RX ADMIN — TIOTROPIUM BROMIDE INHALATION SPRAY 2 PUFF: 3.12 SPRAY, METERED RESPIRATORY (INHALATION) at 08:06

## 2023-01-01 RX ADMIN — CARVEDILOL 6.25 MG: 6.25 TABLET, FILM COATED ORAL at 01:06

## 2023-01-01 RX ADMIN — LIDOCAINE HYDROCHLORIDE 5 ML: 10 INJECTION, SOLUTION INFILTRATION; PERINEURAL at 06:06

## 2023-01-01 RX ADMIN — CEFTRIAXONE 1 G: 1 INJECTION, POWDER, FOR SOLUTION INTRAMUSCULAR; INTRAVENOUS at 10:03

## 2023-01-01 RX ADMIN — ATORVASTATIN CALCIUM 40 MG: 40 TABLET, FILM COATED ORAL at 01:03

## 2023-01-01 RX ADMIN — MIDAZOLAM HYDROCHLORIDE 0.5 MG: 1 INJECTION, SOLUTION INTRAMUSCULAR; INTRAVENOUS at 01:07

## 2023-01-01 RX ADMIN — ASPIRIN 81 MG: 81 TABLET, COATED ORAL at 12:06

## 2023-01-01 RX ADMIN — DEXTROSE, SODIUM CHLORIDE, AND POTASSIUM CHLORIDE 100 ML/HR: 5; .45; .15 INJECTION INTRAVENOUS at 03:04

## 2023-01-01 RX ADMIN — NIFEDIPINE 90 MG: 30 TABLET, FILM COATED, EXTENDED RELEASE ORAL at 08:01

## 2023-01-01 RX ADMIN — NEPHROCAP 1 CAPSULE: 1 CAP ORAL at 08:01

## 2023-01-01 RX ADMIN — ERYTHROMYCIN: 5 OINTMENT OPHTHALMIC at 05:06

## 2023-01-01 RX ADMIN — DOXYCYCLINE 100 MG: 100 INJECTION, POWDER, LYOPHILIZED, FOR SOLUTION INTRAVENOUS at 01:10

## 2023-01-01 RX ADMIN — INSULIN HUMAN 6 UNITS/HR: 1 INJECTION, SOLUTION INTRAVENOUS at 11:03

## 2023-01-01 RX ADMIN — INSULIN ASPART 2 UNITS: 100 INJECTION, SOLUTION INTRAVENOUS; SUBCUTANEOUS at 07:10

## 2023-01-01 RX ADMIN — INSULIN ASPART 5 UNITS: 100 INJECTION, SOLUTION INTRAVENOUS; SUBCUTANEOUS at 09:04

## 2023-01-01 RX ADMIN — INSULIN ASPART 2 UNITS: 100 INJECTION, SOLUTION INTRAVENOUS; SUBCUTANEOUS at 04:04

## 2023-01-01 RX ADMIN — INSULIN HUMAN 3 UNITS/HR: 1 INJECTION, SOLUTION INTRAVENOUS at 12:06

## 2023-01-01 RX ADMIN — FLUTICASONE FUROATE AND VILANTEROL TRIFENATATE 1 PUFF: 100; 25 POWDER RESPIRATORY (INHALATION) at 10:01

## 2023-01-01 RX ADMIN — ISOSORBIDE MONONITRATE 30 MG: 30 TABLET, EXTENDED RELEASE ORAL at 09:06

## 2023-01-01 RX ADMIN — MAGNESIUM SULFATE 2 G: 2 INJECTION INTRAVENOUS at 10:11

## 2023-01-01 RX ADMIN — SODIUM CHLORIDE: 9 INJECTION, SOLUTION INTRAVENOUS at 09:11

## 2023-01-01 RX ADMIN — SODIUM ZIRCONIUM CYCLOSILICATE 5 G: 5 POWDER, FOR SUSPENSION ORAL at 11:03

## 2023-01-01 RX ADMIN — FLUTICASONE FUROATE AND VILANTEROL TRIFENATATE 1 PUFF: 100; 25 POWDER RESPIRATORY (INHALATION) at 08:09

## 2023-01-01 RX ADMIN — CEFTRIAXONE 2 G: 2 INJECTION, POWDER, FOR SOLUTION INTRAMUSCULAR; INTRAVENOUS at 09:06

## 2023-01-01 RX ADMIN — NIFEDIPINE 60 MG: 30 TABLET, FILM COATED, EXTENDED RELEASE ORAL at 11:10

## 2023-01-01 RX ADMIN — PANTOPRAZOLE SODIUM 40 MG: 40 INJECTION, POWDER, FOR SOLUTION INTRAVENOUS at 12:11

## 2023-01-01 RX ADMIN — INSULIN HUMAN 3 UNITS/HR: 1 INJECTION, SOLUTION INTRAVENOUS at 12:03

## 2023-01-01 RX ADMIN — CARVEDILOL 6.25 MG: 6.25 TABLET, FILM COATED ORAL at 08:01

## 2023-01-01 RX ADMIN — LISINOPRIL 40 MG: 20 TABLET ORAL at 03:09

## 2023-01-01 RX ADMIN — METRONIDAZOLE 500 MG: 500 TABLET ORAL at 11:06

## 2023-01-01 RX ADMIN — ONDANSETRON 4 MG: 2 INJECTION INTRAMUSCULAR; INTRAVENOUS at 04:08

## 2023-01-01 RX ADMIN — INSULIN ASPART 4 UNITS: 100 INJECTION, SOLUTION INTRAVENOUS; SUBCUTANEOUS at 08:06

## 2023-01-01 RX ADMIN — ATORVASTATIN CALCIUM 40 MG: 10 TABLET, FILM COATED ORAL at 10:07

## 2023-01-01 RX ADMIN — INSULIN ASPART 4 UNITS: 100 INJECTION, SOLUTION INTRAVENOUS; SUBCUTANEOUS at 06:03

## 2023-01-01 RX ADMIN — APIXABAN 5 MG: 5 TABLET, FILM COATED ORAL at 09:01

## 2023-01-01 RX ADMIN — DEXTROSE MONOHYDRATE 250 ML: 100 INJECTION, SOLUTION INTRAVENOUS at 08:10

## 2023-01-01 RX ADMIN — ASPIRIN 81 MG: 81 TABLET, COATED ORAL at 10:03

## 2023-01-01 RX ADMIN — DEXMEDETOMIDINE 4 MCG: 100 INJECTION, SOLUTION, CONCENTRATE INTRAVENOUS at 01:08

## 2023-01-01 RX ADMIN — FLUOXETINE 40 MG: 20 CAPSULE ORAL at 12:06

## 2023-01-01 RX ADMIN — INSULIN DETEMIR 8 UNITS: 100 INJECTION, SOLUTION SUBCUTANEOUS at 01:04

## 2023-01-01 RX ADMIN — INSULIN ASPART 5 UNITS: 100 INJECTION, SOLUTION INTRAVENOUS; SUBCUTANEOUS at 06:04

## 2023-01-01 RX ADMIN — CARVEDILOL 6.25 MG: 6.25 TABLET, FILM COATED ORAL at 05:03

## 2023-01-01 RX ADMIN — NOREPINEPHRINE BITARTRATE 0.04 MCG/KG/MIN: 4 INJECTION, SOLUTION INTRAVENOUS at 04:11

## 2023-01-01 RX ADMIN — INSULIN ASPART 8 UNITS: 100 INJECTION, SOLUTION INTRAVENOUS; SUBCUTANEOUS at 04:08

## 2023-01-01 RX ADMIN — INSULIN ASPART 5 UNITS: 100 INJECTION, SOLUTION INTRAVENOUS; SUBCUTANEOUS at 08:09

## 2023-01-01 RX ADMIN — OXYMETAZOLINE HYDROCHLORIDE 2 SPRAY: 0.05 SPRAY NASAL at 08:08

## 2023-01-01 RX ADMIN — TIOTROPIUM BROMIDE INHALATION SPRAY 2 PUFF: 3.12 SPRAY, METERED RESPIRATORY (INHALATION) at 10:08

## 2023-01-01 RX ADMIN — DEXMEDETOMIDINE HYDROCHLORIDE 0.2 MCG/KG/HR: 4 INJECTION INTRAVENOUS at 05:10

## 2023-01-01 RX ADMIN — APIXABAN 5 MG: 5 TABLET, FILM COATED ORAL at 08:01

## 2023-01-01 RX ADMIN — HYDRALAZINE HYDROCHLORIDE 25 MG: 25 TABLET, FILM COATED ORAL at 05:03

## 2023-01-01 RX ADMIN — INSULIN ASPART 6 UNITS: 100 INJECTION, SOLUTION INTRAVENOUS; SUBCUTANEOUS at 01:10

## 2023-01-01 RX ADMIN — NIFEDIPINE 60 MG: 30 TABLET, FILM COATED, EXTENDED RELEASE ORAL at 10:06

## 2023-01-01 RX ADMIN — SODIUM CHLORIDE: 9 INJECTION, SOLUTION INTRAVENOUS at 09:03

## 2023-01-01 RX ADMIN — ATORVASTATIN CALCIUM 40 MG: 40 TABLET, FILM COATED ORAL at 01:06

## 2023-01-01 RX ADMIN — FLUTICASONE FUROATE AND VILANTEROL TRIFENATATE 1 PUFF: 100; 25 POWDER RESPIRATORY (INHALATION) at 07:06

## 2023-01-01 RX ADMIN — DEXTROSE MONOHYDRATE: 100 INJECTION, SOLUTION INTRAVENOUS at 03:10

## 2023-01-01 RX ADMIN — POTASSIUM & SODIUM PHOSPHATES POWDER PACK 280-160-250 MG 1 PACKET: 280-160-250 PACK at 05:03

## 2023-01-01 RX ADMIN — OXYMETAZOLINE HCL 2 SPRAY: 0.05 SPRAY NASAL at 03:09

## 2023-01-01 RX ADMIN — PIPERACILLIN SODIUM AND TAZOBACTAM SODIUM 4.5 G: 4; .5 INJECTION, POWDER, FOR SOLUTION INTRAVENOUS at 10:11

## 2023-01-01 RX ADMIN — INSULIN DETEMIR 8 UNITS: 100 INJECTION, SOLUTION SUBCUTANEOUS at 09:03

## 2023-01-01 RX ADMIN — IPRATROPIUM BROMIDE AND ALBUTEROL SULFATE 3 ML: 2.5; .5 SOLUTION RESPIRATORY (INHALATION) at 12:02

## 2023-01-01 RX ADMIN — ASPIRIN 81 MG: 81 TABLET, COATED ORAL at 08:07

## 2023-01-01 RX ADMIN — NIFEDIPINE 30 MG: 30 TABLET, FILM COATED, EXTENDED RELEASE ORAL at 03:03

## 2023-01-01 RX ADMIN — PROPARACAINE HYDROCHLORIDE 1 DROP: 5 SOLUTION/ DROPS OPHTHALMIC at 05:02

## 2023-01-01 RX ADMIN — INSULIN ASPART 3 UNITS: 100 INJECTION, SOLUTION INTRAVENOUS; SUBCUTANEOUS at 10:03

## 2023-01-01 RX ADMIN — METRONIDAZOLE 500 MG: 500 TABLET ORAL at 05:06

## 2023-01-01 RX ADMIN — IOHEXOL 100 ML: 350 INJECTION, SOLUTION INTRAVENOUS at 01:11

## 2023-01-01 RX ADMIN — DEXTROSE MONOHYDRATE 250 ML: 100 INJECTION, SOLUTION INTRAVENOUS at 07:10

## 2023-01-01 RX ADMIN — CARVEDILOL 12.5 MG: 12.5 TABLET, FILM COATED ORAL at 03:09

## 2023-01-01 RX ADMIN — INSULIN ASPART 1 UNITS: 100 INJECTION, SOLUTION INTRAVENOUS; SUBCUTANEOUS at 05:10

## 2023-01-01 RX ADMIN — CARVEDILOL 6.25 MG: 6.25 TABLET, FILM COATED ORAL at 12:03

## 2023-01-01 RX ADMIN — CEFTRIAXONE 1 G: 1 INJECTION, POWDER, FOR SOLUTION INTRAMUSCULAR; INTRAVENOUS at 09:03

## 2023-01-01 RX ADMIN — PROPOFOL 20 MCG/KG/MIN: 10 INJECTION, EMULSION INTRAVENOUS at 08:10

## 2023-01-01 RX ADMIN — ISOSORBIDE MONONITRATE 30 MG: 30 TABLET, EXTENDED RELEASE ORAL at 09:04

## 2023-01-01 RX ADMIN — NEPHROCAP 1 CAPSULE: 1 CAP ORAL at 09:03

## 2023-01-01 RX ADMIN — HYDRALAZINE HYDROCHLORIDE 10 MG: 20 INJECTION, SOLUTION INTRAMUSCULAR; INTRAVENOUS at 02:10

## 2023-01-01 RX ADMIN — INSULIN ASPART 4 UNITS: 100 INJECTION, SOLUTION INTRAVENOUS; SUBCUTANEOUS at 04:09

## 2023-01-01 RX ADMIN — LISINOPRIL 40 MG: 20 TABLET ORAL at 10:10

## 2023-01-01 RX ADMIN — INSULIN ASPART 4 UNITS: 100 INJECTION, SOLUTION INTRAVENOUS; SUBCUTANEOUS at 11:08

## 2023-01-01 RX ADMIN — POTASSIUM BICARBONATE 50 MEQ: 978 TABLET, EFFERVESCENT ORAL at 06:03

## 2023-01-01 RX ADMIN — SODIUM CHLORIDE: 9 INJECTION, SOLUTION INTRAVENOUS at 11:11

## 2023-01-01 RX ADMIN — CARVEDILOL 6.25 MG: 6.25 TABLET, FILM COATED ORAL at 11:03

## 2023-01-01 RX ADMIN — FUROSEMIDE 80 MG: 10 INJECTION, SOLUTION INTRAMUSCULAR; INTRAVENOUS at 01:03

## 2023-01-01 RX ADMIN — POTASSIUM BICARBONATE 40 MEQ: 391 TABLET, EFFERVESCENT ORAL at 10:08

## 2023-01-01 RX ADMIN — ERYTHROPOIETIN 4000 UNITS: 4000 INJECTION, SOLUTION INTRAVENOUS; SUBCUTANEOUS at 04:04

## 2023-01-01 RX ADMIN — INSULIN DETEMIR 7 UNITS: 100 INJECTION, SOLUTION SUBCUTANEOUS at 09:03

## 2023-01-01 RX ADMIN — SEVELAMER CARBONATE 800 MG: 800 TABLET, FILM COATED ORAL at 09:04

## 2023-01-01 RX ADMIN — CARVEDILOL 12.5 MG: 12.5 TABLET, FILM COATED ORAL at 08:09

## 2023-01-01 RX ADMIN — Medication 9 MG: at 12:10

## 2023-01-01 RX ADMIN — INSULIN DETEMIR 6 UNITS: 100 INJECTION, SOLUTION SUBCUTANEOUS at 09:06

## 2023-01-01 RX ADMIN — IPRATROPIUM BROMIDE AND ALBUTEROL SULFATE 3 ML: 2.5; .5 SOLUTION RESPIRATORY (INHALATION) at 08:02

## 2023-01-01 RX ADMIN — INSULIN HUMAN 4 UNITS/HR: 1 INJECTION, SOLUTION INTRAVENOUS at 03:01

## 2023-01-01 RX ADMIN — AMOXICILLIN AND CLAVULANATE POTASSIUM 500 MG: 500; 125 TABLET, FILM COATED ORAL at 01:06

## 2023-01-01 RX ADMIN — THERA TABS 1 TABLET: TAB at 11:10

## 2023-01-01 RX ADMIN — SODIUM CHLORIDE 125 ML/HR: 0.9 INJECTION, SOLUTION INTRAVENOUS at 01:08

## 2023-01-01 RX ADMIN — FLUTICASONE FUROATE AND VILANTEROL TRIFENATATE 1 PUFF: 100; 25 POWDER RESPIRATORY (INHALATION) at 01:03

## 2023-01-01 RX ADMIN — INSULIN ASPART 10 UNITS: 100 INJECTION, SOLUTION INTRAVENOUS; SUBCUTANEOUS at 08:03

## 2023-01-01 RX ADMIN — ATORVASTATIN CALCIUM 40 MG: 40 TABLET, FILM COATED ORAL at 09:06

## 2023-01-01 RX ADMIN — ERYTHROMYCIN 1 INCH: 5 OINTMENT OPHTHALMIC at 02:03

## 2023-01-01 RX ADMIN — LISINOPRIL 40 MG: 20 TABLET ORAL at 07:03

## 2023-01-01 RX ADMIN — INSULIN DETEMIR 7 UNITS: 100 INJECTION, SOLUTION SUBCUTANEOUS at 10:03

## 2023-01-01 RX ADMIN — INSULIN ASPART 6 UNITS: 100 INJECTION, SOLUTION INTRAVENOUS; SUBCUTANEOUS at 06:03

## 2023-01-01 RX ADMIN — SODIUM CHLORIDE 250 ML: 9 INJECTION, SOLUTION INTRAVENOUS at 01:03

## 2023-01-01 RX ADMIN — SEVELAMER CARBONATE 800 MG: 800 TABLET, FILM COATED ORAL at 12:08

## 2023-01-01 RX ADMIN — SEVELAMER CARBONATE 800 MG: 800 TABLET, FILM COATED ORAL at 06:04

## 2023-01-01 RX ADMIN — APIXABAN 5 MG: 5 TABLET, FILM COATED ORAL at 12:08

## 2023-01-01 RX ADMIN — DEXTROSE MONOHYDRATE 125 ML: 100 INJECTION, SOLUTION INTRAVENOUS at 06:11

## 2023-01-01 RX ADMIN — MIDAZOLAM HYDROCHLORIDE 1 MG: 1 INJECTION, SOLUTION INTRAMUSCULAR; INTRAVENOUS at 01:07

## 2023-01-01 RX ADMIN — INSULIN ASPART 10 UNITS: 100 INJECTION, SOLUTION INTRAVENOUS; SUBCUTANEOUS at 11:03

## 2023-01-01 RX ADMIN — ACETAMINOPHEN 650 MG: 325 TABLET ORAL at 11:09

## 2023-01-01 RX ADMIN — Medication 9 MG: at 09:10

## 2023-01-01 RX ADMIN — INSULIN DETEMIR 20 UNITS: 100 INJECTION, SOLUTION SUBCUTANEOUS at 12:09

## 2023-01-01 RX ADMIN — INSULIN ASPART 1 UNITS: 100 INJECTION, SOLUTION INTRAVENOUS; SUBCUTANEOUS at 08:04

## 2023-01-01 RX ADMIN — LISINOPRIL 40 MG: 20 TABLET ORAL at 06:06

## 2023-01-01 RX ADMIN — ATORVASTATIN CALCIUM 40 MG: 40 TABLET, FILM COATED ORAL at 08:02

## 2023-01-01 RX ADMIN — ATORVASTATIN CALCIUM 40 MG: 40 TABLET, FILM COATED ORAL at 02:10

## 2023-01-01 RX ADMIN — NIFEDIPINE 60 MG: 30 TABLET, FILM COATED, EXTENDED RELEASE ORAL at 10:07

## 2023-01-01 RX ADMIN — DEXTROSE MONOHYDRATE: 100 INJECTION, SOLUTION INTRAVENOUS at 12:10

## 2023-01-01 RX ADMIN — INSULIN ASPART 5 UNITS: 100 INJECTION, SOLUTION INTRAVENOUS; SUBCUTANEOUS at 06:01

## 2023-01-01 RX ADMIN — INSULIN ASPART 2 UNITS: 100 INJECTION, SOLUTION INTRAVENOUS; SUBCUTANEOUS at 05:10

## 2023-01-01 RX ADMIN — FLUTICASONE FUROATE AND VILANTEROL TRIFENATATE 1 PUFF: 100; 25 POWDER RESPIRATORY (INHALATION) at 10:04

## 2023-01-01 RX ADMIN — MUPIROCIN: 20 OINTMENT TOPICAL at 12:11

## 2023-01-01 RX ADMIN — LISINOPRIL 40 MG: 20 TABLET ORAL at 08:03

## 2023-01-01 RX ADMIN — FLUTICASONE FUROATE AND VILANTEROL TRIFENATATE 1 PUFF: 100; 25 POWDER RESPIRATORY (INHALATION) at 07:01

## 2023-01-01 RX ADMIN — ISOSORBIDE MONONITRATE 30 MG: 30 TABLET, EXTENDED RELEASE ORAL at 08:07

## 2023-01-01 RX ADMIN — INSULIN ASPART 6 UNITS: 100 INJECTION, SOLUTION INTRAVENOUS; SUBCUTANEOUS at 05:10

## 2023-01-01 RX ADMIN — INSULIN HUMAN 0.01 UNITS/KG/HR: 1 INJECTION, SOLUTION INTRAVENOUS at 11:08

## 2023-01-01 RX ADMIN — INSULIN ASPART 2 UNITS: 100 INJECTION, SOLUTION INTRAVENOUS; SUBCUTANEOUS at 07:03

## 2023-01-01 RX ADMIN — FLUOXETINE 40 MG: 20 CAPSULE ORAL at 08:02

## 2023-01-01 RX ADMIN — DEXTROSE MONOHYDRATE 250 ML: 100 INJECTION, SOLUTION INTRAVENOUS at 05:10

## 2023-01-01 RX ADMIN — Medication 6 MG: at 02:08

## 2023-01-01 RX ADMIN — TIOTROPIUM BROMIDE INHALATION SPRAY 2 PUFF: 3.12 SPRAY, METERED RESPIRATORY (INHALATION) at 08:09

## 2023-01-01 RX ADMIN — CETIRIZINE HYDROCHLORIDE 10 MG: 10 TABLET, FILM COATED ORAL at 08:06

## 2023-01-01 RX ADMIN — APIXABAN 5 MG: 5 TABLET, FILM COATED ORAL at 12:02

## 2023-01-01 RX ADMIN — DEXTROSE 125 ML: 10 SOLUTION INTRAVENOUS at 10:10

## 2023-01-01 RX ADMIN — ASPIRIN 81 MG: 81 TABLET, COATED ORAL at 01:03

## 2023-01-01 RX ADMIN — INSULIN DETEMIR 3 UNITS: 100 INJECTION, SOLUTION SUBCUTANEOUS at 09:10

## 2023-01-01 RX ADMIN — NIFEDIPINE 90 MG: 60 TABLET, FILM COATED, EXTENDED RELEASE ORAL at 01:03

## 2023-01-01 RX ADMIN — INSULIN ASPART 4 UNITS: 100 INJECTION, SOLUTION INTRAVENOUS; SUBCUTANEOUS at 07:08

## 2023-01-01 RX ADMIN — INSULIN ASPART 10 UNITS: 100 INJECTION, SOLUTION INTRAVENOUS; SUBCUTANEOUS at 12:09

## 2023-01-01 RX ADMIN — TIOTROPIUM BROMIDE INHALATION SPRAY 2 PUFF: 3.12 SPRAY, METERED RESPIRATORY (INHALATION) at 10:03

## 2023-01-01 RX ADMIN — INSULIN ASPART 7 UNITS: 100 INJECTION, SOLUTION INTRAVENOUS; SUBCUTANEOUS at 08:10

## 2023-01-01 RX ADMIN — LINEZOLID 600 MG: 600 INJECTION, SOLUTION INTRAVENOUS at 07:11

## 2023-01-01 RX ADMIN — ONDANSETRON 8 MG: 8 TABLET, ORALLY DISINTEGRATING ORAL at 02:02

## 2023-01-01 RX ADMIN — TIOTROPIUM BROMIDE INHALATION SPRAY 2 PUFF: 3.12 SPRAY, METERED RESPIRATORY (INHALATION) at 08:01

## 2023-01-01 RX ADMIN — Medication 6 MG: at 11:09

## 2023-01-01 RX ADMIN — NICARDIPINE HYDROCHLORIDE 2.5 MG/HR: 0.2 INJECTION, SOLUTION INTRAVENOUS at 09:03

## 2023-01-01 RX ADMIN — INSULIN ASPART 4 UNITS: 100 INJECTION, SOLUTION INTRAVENOUS; SUBCUTANEOUS at 11:10

## 2023-01-01 RX ADMIN — HYPROMELLOSE 2910 1 DROP: 5 SOLUTION/ DROPS OPHTHALMIC at 03:11

## 2023-01-01 RX ADMIN — ASPIRIN 81 MG: 81 TABLET, COATED ORAL at 01:06

## 2023-01-01 RX ADMIN — FLUOXETINE 40 MG: 20 CAPSULE ORAL at 12:10

## 2023-01-01 RX ADMIN — ATORVASTATIN CALCIUM 40 MG: 40 TABLET, FILM COATED ORAL at 12:02

## 2023-01-01 RX ADMIN — INSULIN ASPART 4 UNITS: 100 INJECTION, SOLUTION INTRAVENOUS; SUBCUTANEOUS at 11:11

## 2023-01-01 RX ADMIN — INSULIN ASPART 4 UNITS: 100 INJECTION, SOLUTION INTRAVENOUS; SUBCUTANEOUS at 08:08

## 2023-01-01 RX ADMIN — CEFAZOLIN SODIUM 2 G: 1 SOLUTION INTRAVENOUS at 12:07

## 2023-01-01 RX ADMIN — INSULIN ASPART 3 UNITS: 100 INJECTION, SOLUTION INTRAVENOUS; SUBCUTANEOUS at 05:10

## 2023-01-01 RX ADMIN — LISINOPRIL 40 MG: 20 TABLET ORAL at 08:09

## 2023-01-01 RX ADMIN — SODIUM CHLORIDE: 9 INJECTION, SOLUTION INTRAVENOUS at 08:03

## 2023-01-01 RX ADMIN — DEXTROSE AND SODIUM CHLORIDE: 5; .9 INJECTION, SOLUTION INTRAVENOUS at 11:01

## 2023-01-01 RX ADMIN — CLONIDINE 1 PATCH: 0.3 PATCH TRANSDERMAL at 08:03

## 2023-01-01 RX ADMIN — FLUOXETINE 40 MG: 20 CAPSULE ORAL at 10:10

## 2023-01-01 RX ADMIN — TIOTROPIUM BROMIDE INHALATION SPRAY 2 PUFF: 3.12 SPRAY, METERED RESPIRATORY (INHALATION) at 12:06

## 2023-01-01 RX ADMIN — LISINOPRIL 40 MG: 20 TABLET ORAL at 10:09

## 2023-01-01 RX ADMIN — INSULIN DETEMIR 6 UNITS: 100 INJECTION, SOLUTION SUBCUTANEOUS at 11:03

## 2023-01-01 RX ADMIN — NIFEDIPINE 60 MG: 60 TABLET, FILM COATED, EXTENDED RELEASE ORAL at 12:08

## 2023-01-01 RX ADMIN — IPRATROPIUM BROMIDE AND ALBUTEROL SULFATE 3 ML: .5; 3 SOLUTION RESPIRATORY (INHALATION) at 09:10

## 2023-01-01 RX ADMIN — DEXTROSE MONOHYDRATE: 100 INJECTION, SOLUTION INTRAVENOUS at 02:11

## 2023-01-01 RX ADMIN — HEPARIN SODIUM 18 UNITS/KG/HR: 10000 INJECTION, SOLUTION INTRAVENOUS at 10:08

## 2023-01-01 RX ADMIN — FLUTICASONE FUROATE AND VILANTEROL TRIFENATATE 1 PUFF: 100; 25 POWDER RESPIRATORY (INHALATION) at 10:08

## 2023-01-01 RX ADMIN — INSULIN ASPART 10 UNITS: 100 INJECTION, SOLUTION INTRAVENOUS; SUBCUTANEOUS at 10:06

## 2023-01-01 RX ADMIN — NIFEDIPINE 30 MG: 30 TABLET, FILM COATED, EXTENDED RELEASE ORAL at 03:08

## 2023-01-01 RX ADMIN — HYDRALAZINE HYDROCHLORIDE 100 MG: 50 TABLET ORAL at 06:01

## 2023-01-01 RX ADMIN — DEXMEDETOMIDINE HYDROCHLORIDE 1.4 MCG/KG/HR: 4 INJECTION INTRAVENOUS at 03:10

## 2023-01-01 RX ADMIN — CEFAZOLIN 2 G: 330 INJECTION, POWDER, FOR SOLUTION INTRAMUSCULAR; INTRAVENOUS at 01:08

## 2023-01-01 RX ADMIN — DOXYCYCLINE 100 MG: 100 INJECTION, POWDER, LYOPHILIZED, FOR SOLUTION INTRAVENOUS at 03:10

## 2023-01-01 RX ADMIN — IPRATROPIUM BROMIDE AND ALBUTEROL SULFATE 3 ML: .5; 3 SOLUTION RESPIRATORY (INHALATION) at 09:03

## 2023-01-01 RX ADMIN — ISOSORBIDE MONONITRATE 30 MG: 30 TABLET, EXTENDED RELEASE ORAL at 09:01

## 2023-01-01 RX ADMIN — IPRATROPIUM BROMIDE AND ALBUTEROL SULFATE 3 ML: 2.5; .5 SOLUTION RESPIRATORY (INHALATION) at 11:02

## 2023-01-01 RX ADMIN — PIPERACILLIN SODIUM AND TAZOBACTAM SODIUM 4.5 G: 4; .5 INJECTION, POWDER, FOR SOLUTION INTRAVENOUS at 03:11

## 2023-01-01 RX ADMIN — HYPROMELLOSE 2910 1 DROP: 5 SOLUTION/ DROPS OPHTHALMIC at 11:03

## 2023-01-01 RX ADMIN — INSULIN DETEMIR 10 UNITS: 100 INJECTION, SOLUTION SUBCUTANEOUS at 10:01

## 2023-01-01 RX ADMIN — HYDRALAZINE HYDROCHLORIDE 5 MG: 20 INJECTION INTRAMUSCULAR; INTRAVENOUS at 03:08

## 2023-01-01 RX ADMIN — FAMOTIDINE 20 MG: 20 TABLET ORAL at 01:11

## 2023-01-01 RX ADMIN — INSULIN DETEMIR 5 UNITS: 100 INJECTION, SOLUTION SUBCUTANEOUS at 12:08

## 2023-01-01 RX ADMIN — ERYTHROPOIETIN 4000 UNITS: 4000 INJECTION, SOLUTION INTRAVENOUS; SUBCUTANEOUS at 10:03

## 2023-01-01 RX ADMIN — INSULIN ASPART 2 UNITS: 100 INJECTION, SOLUTION INTRAVENOUS; SUBCUTANEOUS at 10:10

## 2023-01-01 RX ADMIN — HYDRALAZINE HYDROCHLORIDE 10 MG: 20 INJECTION, SOLUTION INTRAMUSCULAR; INTRAVENOUS at 08:03

## 2023-01-01 RX ADMIN — ISOSORBIDE MONONITRATE 30 MG: 30 TABLET, EXTENDED RELEASE ORAL at 12:03

## 2023-01-01 RX ADMIN — EPOETIN ALFA-EPBX 3510 UNITS: 3000 INJECTION, SOLUTION INTRAVENOUS; SUBCUTANEOUS at 09:10

## 2023-01-01 RX ADMIN — FLUTICASONE FUROATE AND VILANTEROL TRIFENATATE 1 PUFF: 100; 25 POWDER RESPIRATORY (INHALATION) at 07:08

## 2023-01-01 RX ADMIN — INSULIN ASPART 2 UNITS: 100 INJECTION, SOLUTION INTRAVENOUS; SUBCUTANEOUS at 12:03

## 2023-01-01 RX ADMIN — DEXTROSE MONOHYDRATE 250 ML: 100 INJECTION, SOLUTION INTRAVENOUS at 07:11

## 2023-01-01 RX ADMIN — Medication 9 MG: at 08:03

## 2023-01-01 RX ADMIN — INSULIN ASPART 4 UNITS: 100 INJECTION, SOLUTION INTRAVENOUS; SUBCUTANEOUS at 10:10

## 2023-01-01 RX ADMIN — MUPIROCIN: 20 OINTMENT TOPICAL at 01:03

## 2023-01-01 RX ADMIN — CARVEDILOL 3.12 MG: 3.12 TABLET, FILM COATED ORAL at 10:07

## 2023-01-01 RX ADMIN — CEFTRIAXONE 1 G: 1 INJECTION, POWDER, FOR SOLUTION INTRAMUSCULAR; INTRAVENOUS at 11:06

## 2023-01-01 RX ADMIN — FLUOXETINE 40 MG: 20 CAPSULE ORAL at 10:06

## 2023-01-01 RX ADMIN — INSULIN DETEMIR 5 UNITS: 100 INJECTION, SOLUTION SUBCUTANEOUS at 12:03

## 2023-01-01 RX ADMIN — DEXTROSE MONOHYDRATE: 100 INJECTION, SOLUTION INTRAVENOUS at 11:07

## 2023-01-01 RX ADMIN — SEVELAMER CARBONATE 800 MG: 800 TABLET, FILM COATED ORAL at 12:06

## 2023-01-01 RX ADMIN — INSULIN HUMAN 5 UNITS: 100 INJECTION, SOLUTION PARENTERAL at 01:03

## 2023-01-01 RX ADMIN — INSULIN ASPART 2 UNITS: 100 INJECTION, SOLUTION INTRAVENOUS; SUBCUTANEOUS at 01:10

## 2023-01-01 RX ADMIN — ISOSORBIDE MONONITRATE 30 MG: 30 TABLET, EXTENDED RELEASE ORAL at 10:06

## 2023-01-01 RX ADMIN — HYDRALAZINE HYDROCHLORIDE 100 MG: 50 TABLET ORAL at 03:01

## 2023-01-01 RX ADMIN — INSULIN DETEMIR 4 UNITS: 100 INJECTION, SOLUTION SUBCUTANEOUS at 08:03

## 2023-01-01 RX ADMIN — NIFEDIPINE 90 MG: 30 TABLET, FILM COATED, EXTENDED RELEASE ORAL at 12:03

## 2023-01-01 RX ADMIN — INSULIN ASPART 2 UNITS: 100 INJECTION, SOLUTION INTRAVENOUS; SUBCUTANEOUS at 05:04

## 2023-01-01 RX ADMIN — FLUTICASONE FUROATE AND VILANTEROL TRIFENATATE 1 PUFF: 100; 25 POWDER RESPIRATORY (INHALATION) at 11:03

## 2023-01-01 RX ADMIN — DEXTROSE MONOHYDRATE 25 G: 25 INJECTION, SOLUTION INTRAVENOUS at 05:07

## 2023-01-05 PROBLEM — R73.9 HYPERGLYCEMIA: Status: ACTIVE | Noted: 2023-01-01

## 2023-01-05 NOTE — NURSING
Hourly accuchecks cont. Current Accucheck 159. MD team notified via secure check. No rate change required. NNO at this time for D5 or D51/2NS. Will monitor for s/s hypoglycemia et CPOC as directed

## 2023-01-05 NOTE — NURSING
Hourly accuchecks continue in place. Current rate 0.4ml/hr with accucheck 177. Pt remains NPO. Endocrinology team notified via secure chat of pt glucose <200,  paged team to report results et request for IV transitional fluids. Will CPOC while awaiting new orders.

## 2023-01-05 NOTE — ASSESSMENT & PLAN NOTE
Patient's FSGs are uncontrolled due to hyperglycemia on current medication regimen.  Last A1c reviewed-   Lab Results   Component Value Date    HGBA1C 7.5 (H) 12/20/2022     Most recent fingerstick glucose reviewed-   Recent Labs   Lab 01/04/23  2305 01/05/23  0056 01/05/23  0329   POCTGLUCOSE >500* >500* >500*     Current correctional scale  Low  Maintain anti-hyperglycemic dose as follows-   Antihyperglycemics (From admission, onward)    Start     Stop Route Frequency Ordered    01/05/23 0309  insulin aspart U-100 pen 0-5 Units         -- SubQ Before meals & nightly PRN 01/05/23 0211    01/05/23 0300  insulin regular in 0.9 % NaCl 100 unit/100 mL (1 unit/mL) infusion        Question Answer Comment   Insulin Rate Adjustment (DO NOT MODIFY ANSWER) [email or web address]    Enter initial dose from Infusion Protocol Chart (Units/hr): 4        -- IV Continuous 01/05/23 0155        Hold Oral hypoglycemics while patient is in the hospital.

## 2023-01-05 NOTE — HPI
The patient is a 57 y.o. Black or  Male with multiple co morbidities including ESRD on HD, DM, HTN, COPD & chronic hypoxemic respiratory failure (2-5L @ baseline) anemia, and hx of DVT/PE on Eliquis who presents to ED on 1/4/2023 for evaluation of hyperglycemia. The patient is unable to provide adequate history. Additional history and patient information was obtained from past medical records and ER records. Per EMS sugar was 564. Denies headache, blurry vision, polydipsia, polyphagia, chest pain, SOB, abdominal pain, constipation and diarrhea. Patient endorses still being able to make urine.      ED: AF. /71. Hgb 8.2. Na 134. Glucose 634. Cr 3.7. Anion gap 11. Bicarb 28.1. Not acidotic. Beta-hydroxybutyrate 2.4. CXR showed cardiomegaly with bilateral airspace disease and possible small pleural effusions. Given 12 mg Levemir and 10 mg Novolog by EMS. Insulin drip started in ED. Nephrology consulted for management of ESRD and HD treatment.

## 2023-01-05 NOTE — ED TRIAGE NOTES
Pt reports to ED via EMS from [facility]. . Staff reports CBG reading HIGH- EMS reports initial CBG was 564. Pt AAO x 4 currently.

## 2023-01-05 NOTE — HPI
"Reason for Consult: Management of T2DM, Hyperglycemia     Diabetes diagnosis year: 2018    Home Diabetes Medications:    Levemir 15 units BID  Novolog 10 units TIDAC  Low dose sliding scale insulin    How often checking glucose at home? >4 x day   BG readings on regimen: "Good-range"  Hypoglycemia on the regimen?  No  Missed doses on regimen?  No    Diabetes Complications include:     Hyperglycemia, Diabetic chronic kidney disease     , and Diabetic retinopathy     Complicating diabetes co morbidities:   CKD and ESRD      HPI:   Cosme Lewis is a 57 y.o. male with a hx of ESRD on HD, DM, HTN, COPD & chronic hypoxemic respiratory failure (2-5L @ baseline) anemia, and hx of DVT/PE on Eliquis presents to the ED from his nursing home for hyperglycemia. During routine blood sugar checks, staff noted reading was high. Per EMS sugar was 564. Patient denies knowing what was going on and stated he feels fine. He just heard his sugar was high and was brought here. Denies headache, blurry vision, polydipsia, polyphagia, chest pain, SOB, abdominal pain, constipation and diarrhea. Patient endorses still being able to make urine. Endocrine consulted to manage diabetes and hyperglycemia.     Lab Results   Component Value Date    HGBA1C 7.5 (H) 12/20/2022             "

## 2023-01-05 NOTE — ASSESSMENT & PLAN NOTE
57 y.o. Black or  Male ESRD-HD M-W-F presents to ED on 1/4/2023 with diagnosis of: Hyperglycemia  Nephrology consulted for inpatient ESRD-HD management    Outpatient HD Information:  -Dialysis modality: Hemodialysis  -Outpatient HD unit: [facility]   -Nephrologist: Dr. Holden   -HD TX days: Monday/Wednesday/Friday, duration of treatment: 4 hrs  -Last HD TX prior to hospital admission: 01/04/23  -Dialysis access: LUE AV fistula   -Residual urine: +RRF   -EDW: 78.5 kg    Assessment:   - Dialysis for metabolic clearance and volume management will be provided tomorrow AM.   - Will obtain OP dialysis records  - Continue to monitor intake and output  - Pre and Post-HD weights   - Please avoid gadolinium, fleets, phos-based laxatives, NSAIDs  - Dialysis thrice weekly unless more urgent indications arise. Will evaluate RRT requirements Daily.    Anemia of ESRD   Recent Labs   Lab 01/05/23  0047 01/05/23  0432 01/05/23  0445   WBC 4.12 4.17  --    HGB 8.2* 8.4*  --    HCT 26.5* 27.0* 29*   * 134*  --      No results found for: FESATURATED, FERRITIN    - Goal in ESRD is Hgb of 10-11. Hgb 8.4.  - Will review chronic care plan from outpatient dialysis center for MARCIA dosing.     Mineral Bone Disease in ESRD   Lab Results   Component Value Date    CALCIUM 8.2 (L) 01/05/2023    PHOS 3.0 01/05/2023       - F/U PO4, Mg, Calcium. And albumin levels.   - Renal diet with protein intake goal 1.5 g/kg/d with 1 L fluid restriction   - Novasource with meals  - Continue home phos binder   - Daily renal panel so that phos and albumin is monitored daily.     Hypertension    - BP Elevated  - No lab stick or BP intake on access site.

## 2023-01-05 NOTE — CONSULTS
[facility] - Telemetry Stepdown  Nephrology  Consult Note    Patient Name: Cosme Lewis  MRN: [id number]  Admission Date: 1/4/2023  Hospital Length of Stay: 0 days  Attending Provider: Nette Mao MD   Primary Care Physician: Primary Doctor No  Principal Problem:Hyperglycemia    Inpatient consult to Nephrology  Consult performed by: Rosi Grant, KUMAR, FNP-C  Consult ordered by: Cecily Rowan PA-C  Reason for consult: ESRD        Subjective:     HPI: The patient is a 57 y.o. Black or  Male with multiple co morbidities including ESRD on HD, DM, HTN, COPD & chronic hypoxemic respiratory failure (2-5L @ baseline) anemia, and hx of DVT/PE on Eliquis who presents to ED on 1/4/2023 for evaluation of hyperglycemia. The patient is unable to provide adequate history. Additional history and patient information was obtained from past medical records and ER records. Per EMS sugar was 564. Denies headache, blurry vision, polydipsia, polyphagia, chest pain, SOB, abdominal pain, constipation and diarrhea. Patient endorses still being able to make urine.      ED: AF. /71. Hgb 8.2. Na 134. Glucose 634. Cr 3.7. Anion gap 11. Bicarb 28.1. Not acidotic. Beta-hydroxybutyrate 2.4. CXR showed cardiomegaly with bilateral airspace disease and possible small pleural effusions. Given 12 mg Levemir and 10 mg Novolog by EMS. Insulin drip started in ED. Nephrology consulted for management of ESRD and HD treatment.      Past Medical History:   Diagnosis Date    Chronic kidney disease-mineral and bone disorder 10/12/2022    COPD (chronic obstructive pulmonary disease)     Diabetes mellitus type 1     ESRD on dialysis     Hypertension     SOB (shortness of breath) 10/12/2022    Patient with history COPD treated with Ellipta the albuterol, fluticasone-salmeterol tachypneic in the ED saturating 94% and 6 L on nasal cannula, patient does not know how many  he uses it is in nursing home.    -duo nebs Q 4  Given that  patient is a poor historian, and in the setting of left lower extremity edema and history of coagulopathy PE cannot be ruled out.  Will workup for possible infec       Past Surgical History:   Procedure Laterality Date    AV FISTULA PLACEMENT         Review of patient's allergies indicates:  No Known Allergies  Current Facility-Administered Medications   Medication Frequency    [START ON 1/6/2023] 0.9%  NaCl infusion Once    acetaminophen tablet 650 mg Q4H PRN    albuterol-ipratropium 2.5 mg-0.5 mg/3 mL nebulizer solution 3 mL Q4H PRN    apixaban tablet 5 mg BID    aspirin EC tablet 81 mg Daily    atorvastatin tablet 40 mg Daily    bisacodyL suppository 10 mg Daily PRN    carvediloL tablet 6.25 mg BID    cetirizine tablet 10 mg Daily PRN    [START ON 1/7/2023] cloNIDine 0.3 mg/24 hr td ptwk 1 patch Every Sat    dextrose 10 % infusion PRN    dextrose 10 % infusion PRN    dextrose 10% bolus 125 mL 125 mL PRN    dextrose 10% bolus 125 mL 125 mL PRN    dextrose 10% bolus 250 mL 250 mL PRN    dextrose 10% bolus 250 mL 250 mL PRN    FLUoxetine capsule 40 mg Daily    fluticasone furoate-vilanteroL 100-25 mcg/dose diskus inhaler 1 puff Daily    glucagon (human recombinant) injection 1 mg PRN    glucose chewable tablet 16 g PRN    glucose chewable tablet 24 g PRN    hydrALAZINE tablet 100 mg Q8H    insulin aspart U-100 pen 0-5 Units QID (AC + HS) PRN    insulin regular in 0.9 % NaCl 100 unit/100 mL (1 unit/mL) infusion Continuous    isosorbide mononitrate 24 hr tablet 30 mg BID    melatonin tablet 6 mg Nightly PRN    NIFEdipine 24 hr tablet 90 mg Daily    ondansetron disintegrating tablet 8 mg Q8H PRN    polyethylene glycol packet 17 g Daily PRN    promethazine tablet 25 mg Q6H PRN    sodium chloride 0.9% flush 10 mL PRN    sodium chloride 0.9% flush 10 mL PRN    tiotropium bromide 2.5 mcg/actuation inhaler 2 puff Daily    vitamin renal formula (B-complex-vitamin c-folic acid) 1 mg per  capsule 1 capsule Daily     Family History       Problem Relation (Age of Onset)    Diabetes Mother          Tobacco Use    Smoking status: Never    Smokeless tobacco: Never   Substance and Sexual Activity    Alcohol use: Not Currently    Drug use: Not on file    Sexual activity: Not Currently     Review of Systems   Constitutional:  Negative for chills and fever.   HENT:  Negative for congestion and sinus pain.    Eyes:  Negative for visual disturbance.   Respiratory:  Negative for chest tightness and shortness of breath.    Cardiovascular:  Negative for chest pain and leg swelling.   Gastrointestinal:  Negative for abdominal pain, constipation, diarrhea, nausea and vomiting.   Genitourinary:  Negative for dysuria, frequency and urgency.   Musculoskeletal:  Negative for arthralgias, back pain, gait problem and myalgias.   Skin:  Negative for wound.   Neurological:  Negative for dizziness, tremors, weakness, numbness and headaches.   Psychiatric/Behavioral:  Negative for agitation, behavioral problems, confusion and decreased concentration.    Objective:     Vital Signs (Most Recent):  Temp: 97.9 °F (36.6 °C) (01/05/23 1014)  Pulse: (!) 56 (01/05/23 1022)  Resp: 18 (01/05/23 1014)  BP: (!) 173/84 (01/05/23 1014)  SpO2: 100 % (01/05/23 1014)   Vital Signs (24h Range):  Temp:  [97.9 °F (36.6 °C)-98 °F (36.7 °C)] 97.9 °F (36.6 °C)  Pulse:  [56-76] 56  Resp:  [12-18] 18  SpO2:  [90 %-100 %] 100 %  BP: (149-185)/(71-88) 173/84     Weight: 80 kg (176 lb 5.9 oz) (01/05/23 1014)  Body mass index is 26.05 kg/m².  Body surface area is 1.97 meters squared.    I/O last 3 completed shifts:  In: 250 [IV Piggyback:250]  Out: -     Physical Exam  Vitals and nursing note reviewed.   Constitutional:       General: He is not in acute distress.     Appearance: He is well-developed.   HENT:      Head: Normocephalic and atraumatic.      Mouth/Throat:      Pharynx: No oropharyngeal exudate.   Eyes:      Conjunctiva/sclera:  Conjunctivae normal.      Pupils: Pupils are equal, round, and reactive to light.   Cardiovascular:      Rate and Rhythm: Normal rate and regular rhythm.      Heart sounds: Normal heart sounds.   Pulmonary:      Effort: Pulmonary effort is normal. No respiratory distress.      Breath sounds: Normal breath sounds. No wheezing.   Abdominal:      General: Bowel sounds are normal. There is no distension.      Palpations: Abdomen is soft.      Tenderness: There is no abdominal tenderness.   Musculoskeletal:         General: No tenderness. Normal range of motion.      Cervical back: Normal range of motion and neck supple.   Lymphadenopathy:      Cervical: No cervical adenopathy.   Skin:     General: Skin is warm and dry.      Capillary Refill: Capillary refill takes less than 2 seconds.      Findings: No rash.      Comments: LUE HD access site     Neurological:      Mental Status: He is alert and oriented to person, place, and time.      Cranial Nerves: No cranial nerve deficit.      Sensory: No sensory deficit.      Coordination: Coordination normal.   Psychiatric:         Behavior: Behavior normal.         Thought Content: Thought content normal.         Judgment: Judgment normal.       Significant Labs:  CBC:   Recent Labs   Lab 01/05/23  0432 01/05/23  0445   WBC 4.17  --    RBC 2.94*  --    HGB 8.4*  --    HCT 27.0* 29*   *  --    MCV 92  --    MCH 28.6  --    MCHC 31.1*  --      CMP:   Recent Labs   Lab 01/05/23  0432   *   CALCIUM 8.2*   ALBUMIN 2.8*   PROT 6.3      K 3.8   CO2 27   CL 96   BUN 30*   CREATININE 3.9*   ALKPHOS 170*   ALT 28   AST 21   BILITOT 0.6     All labs within the past 24 hours have been reviewed.    Assessment/Plan:     * Hyperglycemia  - defer to primary team     ESRD on dialysis  57 y.o. Black or  Male ESRD-HD M-W-F presents to ED on 1/4/2023 with diagnosis of: Hyperglycemia  Nephrology consulted for inpatient ESRD-HD management    Outpatient HD  Information:  -Dialysis modality: Hemodialysis  -Outpatient HD unit: [facility]   -Nephrologist: Dr. Holden   -HD TX days: Monday/Wednesday/Friday, duration of treatment: 4 hrs  -Last HD TX prior to hospital admission: 01/04/23  -Dialysis access: LUE AV fistula   -Residual urine: +RRF   -EDW: 78.5 kg    Assessment:   - Dialysis for metabolic clearance and volume management will be provided tomorrow AM.   - Will obtain OP dialysis records  - Continue to monitor intake and output  - Pre and Post-HD weights   - Please avoid gadolinium, fleets, phos-based laxatives, NSAIDs  - Dialysis thrice weekly unless more urgent indications arise. Will evaluate RRT requirements Daily.    Anemia of ESRD   Recent Labs   Lab 01/05/23  0047 01/05/23  0432 01/05/23  0445   WBC 4.12 4.17  --    HGB 8.2* 8.4*  --    HCT 26.5* 27.0* 29*   * 134*  --      No results found for: FESATURATED, FERRITIN    - Goal in ESRD is Hgb of 10-11. Hgb 8.4.  - Will review chronic care plan from outpatient dialysis center for MARCIA dosing.     Mineral Bone Disease in ESRD   Lab Results   Component Value Date    CALCIUM 8.2 (L) 01/05/2023    PHOS 3.0 01/05/2023       - F/U PO4, Mg, Calcium. And albumin levels.   - Renal diet with protein intake goal 1.5 g/kg/d with 1 L fluid restriction   - Novasource with meals  - Continue home phos binder   - Daily renal panel so that phos and albumin is monitored daily.     Hypertension    - BP Elevated  - No lab stick or BP intake on access site.    Thank you for your consult. I will follow-up with patient. Please contact us if you have any additional questions.    Rosi Grant, KUMAR, FNP-C  Nephrology  [facility] - Telemetry Stepdown

## 2023-01-05 NOTE — ASSESSMENT & PLAN NOTE
Patient presenting to the ED from his nursing home for hyperglycemia w/o signs of DKA  - Glucose 634 on admit  - beta-hydroxybutyrate 2.4, bicarb 28.1, anion gap 11  - last a1c 7.5  - s/p 12 units of levemir and 10 units novolog by EMS  - repeat glucose >500  - insulin drip initiated in the ED  - NPO until blood sugar is managed  - Endocrinology consulted, appreciate assistance for home regimen  - monitor blood glucose levels  - restart home regimen once controlled  - caution with fluids since pt is ESRD

## 2023-01-05 NOTE — H&P
[facility] - Emergency Dept  [facility] Medicine  History & Physical    Patient Name: Cosme Lewis  MRN: [id number]  Patient Class: OP- Observation  Admission Date: 1/4/2023  Attending Physician: Stuart Claros MD   Primary Care Provider: Primary Doctor No         Patient information was obtained from patient and ER records.     Subjective:     Principal Problem:Hyperglycemia    Chief Complaint:   Chief Complaint   Patient presents with    Hyperglycemia     [place] resident that staff reports CBG reading HIGH- EMS reports initial CBG was 564        HPI: Cosme Lewis is a 57 y.o. male with a hx of ESRD on HD, DM, HTN, COPD & chronic hypoxemic respiratory failure (2-5L @ baseline) anemia, and hx of DVT/PE on Eliquis presents to the ED from his nursing home for hyperglycemia. During routine blood sugar checks, staff noted reading was high. Per EMS sugar was 564. Patient denies knowing what was going on and stated he feels fine. He just heard his sugar was high and was brought here. Denies headache, blurry vision, polydipsia, polyphagia, chest pain, SOB, abdominal pain, constipation and diarrhea. Patient endorses still being able to make urine.     ED: AF. /71. Hgb 8.2. Na 134. Glucose 634. Cr 3.7. Anion gap 11. Bicarb 28.1. Not acidotic. Beta-hydroxybutyrate 2.4. CXR showed cardiomegaly with bilateral airspace disease and possible small pleural effusions.  Aeration is similar to mildly worse when compared with 12/21/2022. Given 12mg  Levemir and 10mg Novolog by EMS. Insulin drip started in ED.      Past Medical History:   Diagnosis Date    Chronic kidney disease-mineral and bone disorder 10/12/2022    COPD (chronic obstructive pulmonary disease)     Diabetes mellitus type 1     ESRD on dialysis     Hypertension     SOB (shortness of breath) 10/12/2022    Patient with history COPD treated with Ellipta the albuterol, fluticasone-salmeterol tachypneic in the ED saturating 94% and 6 L on nasal cannula, patient does  not know how many  he uses it is in nursing home.    -duo nebs Q 4  Given that patient is a poor historian, and in the setting of left lower extremity edema and history of coagulopathy PE cannot be ruled out.  Will workup for possible infec       Past Surgical History:   Procedure Laterality Date    AV FISTULA PLACEMENT         Review of patient's allergies indicates:  No Known Allergies    No current facility-administered medications on file prior to encounter.     Current Outpatient Medications on File Prior to Encounter   Medication Sig    acetaminophen (TYLENOL) 650 MG TbSR Take 650 mg by mouth every 8 (eight) hours as needed (pain or fever over 100.4).    albuterol (PROVENTIL/VENTOLIN HFA) 90 mcg/actuation inhaler Inhale 2 puffs into the lungs every 6 (six) hours as needed for Wheezing or Shortness of Breath.    apixaban (ELIQUIS) 5 mg Tab Take 1 tablet (5 mg total) by mouth 2 (two) times daily. Start this dose AFTER you have completed the 7 days of the 10 mg dose.    aspirin (ECOTRIN) 81 MG EC tablet Take 81 mg by mouth once daily.    atorvastatin (LIPITOR) 40 MG tablet Take 1 tablet (40 mg total) by mouth once daily.    carvediloL (COREG) 3.125 MG tablet Take 2 tablets (6.25 mg total) by mouth 2 (two) times daily.    cetirizine (ZYRTEC) 10 MG tablet Take 10 mg by mouth daily as needed (itching).    cloNIDine 0.3 mg/24 hr td ptwk (CATAPRES) 0.3 mg/24 hr Place 1 patch onto the skin every Saturday.    FLUoxetine 40 MG capsule Take 40 mg by mouth once daily.    fluticasone-salmeterol diskus inhaler 250-50 mcg Inhale 1 puff into the lungs 2 (two) times daily.    GLUCAGON EMERGENCY KIT, HUMAN, 1 mg injection 1 mg into the muscle as needed if CBG < 70    hydrALAZINE (APRESOLINE) 100 MG tablet Take 1 tablet (100 mg total) by mouth every 8 (eight) hours.    HYDROPHILIC CREAM TOP Apply topically. Apply liberal amount to affected area twice daily for dry skin    insulin aspart U-100 (NOVOLOG) 100 unit/mL  "(3 mL) InPn pen Inject 5 Units into the skin 3 (three) times daily with meals.    insulin detemir U-100 (LEVEMIR FLEXTOUCH) 100 unit/mL (3 mL) SubQ InPn pen Inject 12 Units into the skin once daily.    isosorbide mononitrate (IMDUR) 30 MG 24 hr tablet Take 30 mg by mouth 2 (two) times daily.    melatonin 3 mg TbDL Take 9 mg by mouth nightly as needed (sleep).    NIFEdipine (PROCARDIA-XL) 90 MG (OSM) 24 hr tablet Take 1 tablet (90 mg total) by mouth once daily.    pen needle, diabetic 29 gauge x 1/2" Ndle bd pen    pen needle, diabetic 32 gauge x 5/32" Ndle Use to inject 1 each into the skin 5 (five) times daily.    sodium chloride (OCEAN) 0.65 % nasal spray 2 sprays by Nasal route every 4 (four) hours as needed for Congestion.    tiotropium bromide (SPIRIVA RESPIMAT) 2.5 mcg/actuation inhaler Inhale 2 puffs into the lungs Daily.    triamcinolone acetonide 0.025 % Lotn Apply topically. Apply to the affected area twice daily    vitamin renal formula, B-complex-vitamin c-folic acid, (NEPHROCAP) 1 mg Cap Take 1 capsule by mouth once daily.     Family History       Problem Relation (Age of Onset)    Diabetes Mother          Tobacco Use    Smoking status: Never    Smokeless tobacco: Never   Substance and Sexual Activity    Alcohol use: Not Currently    Drug use: Not on file    Sexual activity: Not Currently     Review of Systems   Constitutional:  Negative for chills and fever.   HENT:  Negative for congestion, postnasal drip, rhinorrhea and sinus pain.    Eyes:  Negative for visual disturbance.   Respiratory:  Negative for chest tightness and shortness of breath.    Cardiovascular:  Negative for chest pain and leg swelling.   Gastrointestinal:  Negative for abdominal pain, constipation, diarrhea, nausea and vomiting.   Endocrine: Negative for polydipsia, polyphagia and polyuria.   Genitourinary:  Negative for dysuria, frequency and urgency.   Musculoskeletal:  Negative for arthralgias, back pain, gait " problem and myalgias.   Neurological:  Negative for dizziness, tremors, weakness, numbness and headaches.   Objective:     Vital Signs (Most Recent):  Temp: 98 °F (36.7 °C) (01/04/23 2300)  Pulse: 72 (01/05/23 0101)  Resp: 16 (01/05/23 0101)  BP: (!) 173/82 (01/05/23 0050)  SpO2: (!) 90 % (01/05/23 0101)   Vital Signs (24h Range):  Temp:  [98 °F (36.7 °C)] 98 °F (36.7 °C)  Pulse:  [71-75] 72  Resp:  [14-18] 16  SpO2:  [90 %-97 %] 90 %  BP: (149-173)/(71-82) 173/82        There is no height or weight on file to calculate BMI.    Physical Exam  Vitals and nursing note reviewed.   Constitutional:       General: He is not in acute distress.     Appearance: He is well-developed.   HENT:      Head: Normocephalic and atraumatic.      Mouth/Throat:      Pharynx: No oropharyngeal exudate.   Eyes:      Conjunctiva/sclera: Conjunctivae normal.      Pupils: Pupils are equal, round, and reactive to light.   Cardiovascular:      Rate and Rhythm: Normal rate and regular rhythm.      Heart sounds: Normal heart sounds.   Pulmonary:      Effort: Pulmonary effort is normal. No respiratory distress.      Breath sounds: Normal breath sounds. No wheezing.   Abdominal:      General: Bowel sounds are normal. There is no distension.      Palpations: Abdomen is soft.      Tenderness: There is no abdominal tenderness.   Musculoskeletal:         General: No tenderness. Normal range of motion.      Cervical back: Normal range of motion and neck supple.   Lymphadenopathy:      Cervical: No cervical adenopathy.   Skin:     General: Skin is warm and dry.      Capillary Refill: Capillary refill takes less than 2 seconds.      Findings: No rash.      Comments: LUE HD access site     Neurological:      Mental Status: He is alert and oriented to person, place, and time.      Cranial Nerves: No cranial nerve deficit.      Sensory: No sensory deficit.      Coordination: Coordination normal.   Psychiatric:         Behavior: Behavior normal.          Thought Content: Thought content normal.         Judgment: Judgment normal.         CRANIAL NERVES     CN III, IV, VI   Pupils are equal, round, and reactive to light.     Significant Labs: All pertinent labs within the past 24 hours have been reviewed.  BMP:   Recent Labs   Lab 01/05/23 0047   *   *   K 3.8   CL 95   CO2 28   BUN 30*   CREATININE 3.7*   CALCIUM 8.3*     CBC:   Recent Labs   Lab 01/05/23 0047   WBC 4.12   HGB 8.2*   HCT 26.5*   *       Significant Imaging: I have reviewed all pertinent imaging results/findings within the past 24 hours.    Assessment/Plan:     * Hyperglycemia  Patient presenting to the ED from his nursing home for hyperglycemia w/o signs of DKA  - Glucose 634 on admit  - beta-hydroxybutyrate 2.4, bicarb 28.1, anion gap 11  - last a1c 7.5  - s/p 12 units of levemir and 10 units novolog by EMS  - repeat glucose >500  - insulin drip initiated in the ED  - NPO until blood sugar is managed  - POCT every hour until controlled  - Endocrinology consulted, appreciate assistance for home regimen  - monitor blood glucose levels  - restart home regimen once controlled  - caution with fluids since pt is ESRD    HLD (hyperlipidemia)  - continue statin    Renovascular hypertension  - continue imdur, hydralazine, coreg, nifedipine and clonidine    Anemia in ESRD (end-stage renal disease)  Hgb 8.2 (~BL 8-10)  - monitor daily CBC  - transfuse <7  - Nephrology consulted    Multiple subsegmental pulmonary emboli without acute cor pulmonale  - continue eliquis and aspirin    Other emphysema  Patient on chronic O2 (~BL 2-5 L)  - continue home O2 sats, keep >88%  - continue home LAMA and LABA  - duo nebs PRN    Diabetes mellitus with chronic kidney disease on chronic dialysis  Patient's FSGs are uncontrolled due to hyperglycemia on current medication regimen.  Last A1c reviewed-   Lab Results   Component Value Date    HGBA1C 7.5 (H) 12/20/2022     Most recent fingerstick glucose  reviewed-   Recent Labs   Lab 01/04/23  2305 01/05/23  0056 01/05/23  0329   POCTGLUCOSE >500* >500* >500*     Current correctional scale  Low  Maintain anti-hyperglycemic dose as follows-   Antihyperglycemics (From admission, onward)    Start     Stop Route Frequency Ordered    01/05/23 0309  insulin aspart U-100 pen 0-5 Units         -- SubQ Before meals & nightly PRN 01/05/23 0211    01/05/23 0300  insulin regular in 0.9 % NaCl 100 unit/100 mL (1 unit/mL) infusion        Question Answer Comment   Insulin Rate Adjustment (DO NOT MODIFY ANSWER) [email or web address] [id number]    Enter initial dose from Infusion Protocol Chart (Units/hr): 4        -- IV Continuous 01/05/23 0155        Hold Oral hypoglycemics while patient is in the hospital.    ESRD on dialysis  CKD with mineral bone disorder  Chronic dialysis MWF  - last session on Wednesday  - Nephrology consulted, appreciate recs      VTE Risk Mitigation (From admission, onward)         Ordered     apixaban tablet 5 mg  2 times daily         01/05/23 0242     Reason for No Pharmacological VTE Prophylaxis  Once        Question:  Reasons:  Answer:  Already adequately anticoagulated on oral Anticoagulants    01/05/23 0211     IP VTE HIGH RISK PATIENT  Once         01/05/23 0211     Place sequential compression device  Until discontinued         01/05/23 0155                   Cecily Rowan PA-C  Department of Hospital Medicine   [facility] - Emergency Dept

## 2023-01-05 NOTE — SUBJECTIVE & OBJECTIVE
Interval HPI:   Overnight events: No acute events overnight. Patient on the MTSU in room 8097/8097 A. Blood glucose stable. BG at goal on current insulin regimen (IIP). Steroid use- None.    Renal function- Abnormal - HD   Vasopressors-  None       Endocrine will continue to follow and manage insulin orders inpatient.         Diet NPO     Eating:   NPO  Nausea: No  Hypoglycemia and intervention: No  Fever: No  TPN and/or TF: No      PMH, PSH, FH, SH updated and reviewed     ROS:  Review of Systems  ALESSANDRA due to contusion    Current Medications and/or Treatments Impacting Glycemic Control  Immunotherapy:    Immunosuppressants       None          Steroids:   Hormones (From admission, onward)      Start     Stop Route Frequency Ordered    01/05/23 0309  melatonin tablet 6 mg         -- Oral Nightly PRN 01/05/23 0211          Pressors:    Autonomic Drugs (From admission, onward)      None          Hyperglycemia/Diabetes Medications:   Antihyperglycemics (From admission, onward)      Start     Stop Route Frequency Ordered    01/05/23 0309  insulin aspart U-100 pen 0-5 Units         -- SubQ Before meals & nightly PRN 01/05/23 0211    01/05/23 0300  insulin regular in 0.9 % NaCl 100 unit/100 mL (1 unit/mL) infusion        Question Answer Comment   Insulin Rate Adjustment (DO NOT MODIFY ANSWER) \\[email or web address]\InsulinDKA [id number]    Enter initial dose from Infusion Protocol Chart (Units/hr): 4        -- IV Continuous 01/05/23 0155             PHYSICAL EXAMINATION:  Vitals:    01/05/23 1022   BP:    Pulse: (!) 56   Resp:    Temp:      Body mass index is 26.05 kg/m².    Physical Exam:  Constitutional: Well developed, well nourished, NAD.  ENT: External ears no masses with nose patent; normal hearing.  Neck: Supple; trachea midline.  Cardiovascular: Normal heart sounds, no LE edema. DP +2 bilaterally.  Lungs: Normal effort; lungs anterior bilaterally clear to auscultation.  Abdomen: Soft, no  masses, no hernias.  MS: No clubbing or cyanosis of nails noted; unable to assess gait.  Skin: No rashes, lesions, or ulcers; no nodules. Injection sites are ok. No lipo hypertropthy or atrophy.  Psychiatric: Poor judgement and insight; normal mood and affect.  Neurological: Cranial nerves are grossly intact.   Foot: Nails in good condition, no amputations noted.

## 2023-01-05 NOTE — CONSULTS
"[facility] - Telemetry Stepdown  Endocrinology  Diabetes Consult Note    Consult Requested by: Nette Mao MD   Reason for admit: Hyperglycemia    HISTORY OF PRESENT ILLNESS:  Reason for Consult: Management of T2DM, Hyperglycemia     Diabetes diagnosis year: 2018    Home Diabetes Medications:    Levemir 15 units BID  Novolog 10 units TIDAC  Low dose sliding scale insulin    How often checking glucose at home? >4 x day   BG readings on regimen: "Good-range"  Hypoglycemia on the regimen?  No  Missed doses on regimen?  No    Diabetes Complications include:     Hyperglycemia, Diabetic chronic kidney disease     , and Diabetic retinopathy     Complicating diabetes co morbidities:   CKD and ESRD      HPI:   Cosme Lewis is a 57 y.o. male with a hx of ESRD on HD, DM, HTN, COPD & chronic hypoxemic respiratory failure (2-5L @ baseline) anemia, and hx of DVT/PE on Eliquis presents to the ED from his nursing home for hyperglycemia. During routine blood sugar checks, staff noted reading was high. Per EMS sugar was 564. Patient denies knowing what was going on and stated he feels fine. He just heard his sugar was high and was brought here. Denies headache, blurry vision, polydipsia, polyphagia, chest pain, SOB, abdominal pain, constipation and diarrhea. Patient endorses still being able to make urine. Endocrine consulted to manage diabetes and hyperglycemia.     Lab Results   Component Value Date    HGBA1C 7.5 (H) 12/20/2022                 Interval HPI:   Overnight events: No acute events overnight. Patient on the MTSU in room 8097/8097 A. Blood glucose stable. BG at goal on current insulin regimen (IIP). Steroid use- None.    Renal function- Abnormal - HD   Vasopressors-  None       Endocrine will continue to follow and manage insulin orders inpatient.         Diet NPO     Eating:   NPO  Nausea: No  Hypoglycemia and intervention: No  Fever: No  TPN and/or TF: No      PMH, PSH, FH, SH updated and reviewed     ROS:  Review of " Systems  ALESSANDRA due to contusion    Current Medications and/or Treatments Impacting Glycemic Control  Immunotherapy:    Immunosuppressants       None          Steroids:   Hormones (From admission, onward)      Start     Stop Route Frequency Ordered    01/05/23 0309  melatonin tablet 6 mg         -- Oral Nightly PRN 01/05/23 0211          Pressors:    Autonomic Drugs (From admission, onward)      None          Hyperglycemia/Diabetes Medications:   Antihyperglycemics (From admission, onward)      Start     Stop Route Frequency Ordered    01/05/23 0309  insulin aspart U-100 pen 0-5 Units         -- SubQ Before meals & nightly PRN 01/05/23 0211    01/05/23 0300  insulin regular in 0.9 % NaCl 100 unit/100 mL (1 unit/mL) infusion        Question Answer Comment   Insulin Rate Adjustment (DO NOT MODIFY ANSWER) [email or web address]\[email or web address] [id number]    Enter initial dose from Infusion Protocol Chart (Units/hr): 4        -- IV Continuous 01/05/23 0155             PHYSICAL EXAMINATION:  Vitals:    01/05/23 1022   BP:    Pulse: (!) 56   Resp:    Temp:      Body mass index is 26.05 kg/m².    Physical Exam:  Constitutional: Well developed, well nourished, NAD.  ENT: External ears no masses with nose patent; normal hearing.  Neck: Supple; trachea midline.  Cardiovascular: Normal heart sounds, no LE edema. DP +2 bilaterally.  Lungs: Normal effort; lungs anterior bilaterally clear to auscultation.  Abdomen: Soft, no masses, no hernias.  MS: No clubbing or cyanosis of nails noted; unable to assess gait.  Skin: No rashes, lesions, or ulcers; no nodules. Injection sites are ok. No lipo hypertropthy or atrophy.  Psychiatric: Poor judgement and insight; normal mood and affect.  Neurological: Cranial nerves are grossly intact.   Foot: Nails in good condition, no amputations noted.          Labs Reviewed and Include   Recent Labs   Lab 01/05/23  0432   *   CALCIUM 8.2*   ALBUMIN 2.8*   PROT 6.3   NA  136   K 3.8   CO2 27   CL 96   BUN 30*   CREATININE 3.9*   ALKPHOS 170*   ALT 28   AST 21   BILITOT 0.6     Lab Results   Component Value Date    WBC 4.17 01/05/2023    HGB 8.4 (L) 01/05/2023    HCT 29 (L) 01/05/2023    MCV 92 01/05/2023     (L) 01/05/2023     No results for input(s): TSH, FREET4 in the last 168 hours.  Lab Results   Component Value Date    HGBA1C 7.5 (H) 12/20/2022       Nutritional status:   Body mass index is 26.05 kg/m².  Lab Results   Component Value Date    ALBUMIN 2.8 (L) 01/05/2023    ALBUMIN 2.8 (L) 01/05/2023    ALBUMIN 2.5 (L) 12/19/2022     No results found for: PREALBUMIN    Estimated Creatinine Clearance: 20.4 mL/min (A) (based on SCr of 3.9 mg/dL (H)).    Accu-Checks  Recent Labs     01/04/23  2305 01/05/23  0056 01/05/23  0329 01/05/23  0430 01/05/23  0842 01/05/23  0938 01/05/23  1039   POCTGLUCOSE >500* >500* >500* >500* 302* 219* 177*        ASSESSMENT and PLAN    * Hyperglycemia  See DM note      Diabetes mellitus with chronic kidney disease on chronic dialysis  Endocrinology consulted for BG management.   BG goal 140-180     - D/C IIP  - Weight-based at 0.4 units/kg/day  - Transition drip at 0.6 units/hr with step-down parameters.   - Novolog (aspart) insulin 5 Units SQ TIDWM and prn for BG excursions LDC SSI (150/50).  - BG checks q4hr  - Hypoglycemia protocol in place    From previous admission unclear etiology of T1DM vs T2DM. Will check a C-peptide and random glucose.      ** Please notify Endocrine for any change and/or advance in diet**  ** Please call Endocrine for any BG related issues **    Discharge Planning:   TBD. Please notify endocrinology prior to discharge.        ESRD on dialysis  Titrate insulin slowly to avoid hypoglycemia as the risk of hypoglycemia increases with decreased creatinine clearance.    Estimated Creatinine Clearance: 20.4 mL/min (A) (based on SCr of 3.9 mg/dL (H)).        Anemia in ESRD (end-stage renal disease)  Anemia may impact the  accuracy of the A1C.          Plan discussed with patient, family, and RN at bedside.        Robbie Alexander, DNP, FNP  Endocrinology  [facility] - Telemetry Stepdown

## 2023-01-05 NOTE — HPI
Cosme Lewis is a 57 y.o. male with a hx of ESRD on HD, DM, HTN, COPD & chronic hypoxemic respiratory failure (2-5L @ baseline), COPD & chronic hypoxemic respiratory failure (2-5L @ baseline), anemia, and hx of DVT/PE on Eliquis presents to the ED from his nursing home for hyperglycemia. During routine blood sugar checks, staff noted reading was high. Per EMS sugar was 564. Patient denies knowing what was going on and stated he feels fine. Denies headache, blurry vision, polydipsia, polyphagia, chest pain, SOB, abdominal pain, constipation and diarrhea. Patient endorses still being able to make urine.     ED: AF. /71. Hgb 8.2. Na 134. Glucose 634. Cr 3.7. Beta-hydroxybutyrate 2.4. CXR showed cardiomegaly with bilateral airspace disease and possible small pleural effusions.  Aeration is similar to mildly worse when compared with 12/21/2022. Given 12mg  Levomir and 10mg Novolog by EMS. Insulin drip started in ED.

## 2023-01-05 NOTE — ASSESSMENT & PLAN NOTE
CKD with mineral bone disorder  Chronic dialysis MWF  - last session on Wednesday  - Nephrology consulted, appreciate recs

## 2023-01-05 NOTE — ED PROVIDER NOTES
Source of History:  Patient  Nursing home nurse  Chart    Chief complaint:  Hyperglycemia ([place] resident that staff reports CBG reading HIGH- EMS reports initial CBG was 564)      HPI:  Cosme Lewis is a 57 y.o. male with history of ESRD on HD MWF, COPD & chronic hypoxemic respiratory failure (2-5L @ baseline), hypertension, type 2 diabetes with previous episode of DKA, QT prolongation, VTE anticoagulated on Eliquis, nursing home resident, presenting to emergency department with complaint of elevated blood glucose level.    Per chart review, patient with recent 2 day hospital admission to this facility, discharged on 12/22/2022.  He had mild DKA as well as epistaxis.  He then developed hypoglycemia to the 30s on 12/22.  His Levemir was decreased to 5 units b.i.d. and NovoLog to 5 units t.i.d. a.c..  He was discharged on 2 L nasal cannula.    EMS told staff here that [facility] checked his sugar multiple times and it read high.   Patient did receive hemodialysis today.      I spoke with the patient's nurse at the nursing home.  She states that he has been acting normally and he has not had vomiting, fevers, difficulty breathing, or any other complaints, however after he came back from his full dialysis session today, his sugars were high on multiple checks.  Patient's nurse states that she gave him p.r.n. doses of insulin with no improvement      ROS: As per HPI and below:  Review of Systems   Constitutional:  Negative for fever.   HENT:  Negative for sore throat.    Eyes:  Negative for double vision.   Respiratory:  Negative for cough and shortness of breath.    Cardiovascular:  Negative for chest pain.   Gastrointestinal:  Negative for abdominal pain and vomiting.   Genitourinary:  Negative for dysuria.   Musculoskeletal:  Negative for falls.   Skin:  Negative for rash.   Neurological:  Negative for headaches.     Review of patient's allergies indicates:  No Known Allergies    No current  facility-administered medications on file prior to encounter.     Current Outpatient Medications on File Prior to Encounter   Medication Sig Dispense Refill    acetaminophen (TYLENOL) 650 MG TbSR Take 650 mg by mouth every 8 (eight) hours as needed (pain or fever over 100.4).      albuterol (PROVENTIL/VENTOLIN HFA) 90 mcg/actuation inhaler Inhale 2 puffs into the lungs every 6 (six) hours as needed for Wheezing or Shortness of Breath.      apixaban (ELIQUIS) 5 mg Tab Take 1 tablet (5 mg total) by mouth 2 (two) times daily. Start this dose AFTER you have completed the 7 days of the 10 mg dose. 180 tablet 0    aspirin (ECOTRIN) 81 MG EC tablet Take 81 mg by mouth once daily.      atorvastatin (LIPITOR) 40 MG tablet Take 1 tablet (40 mg total) by mouth once daily. 90 tablet 3    carvediloL (COREG) 3.125 MG tablet Take 2 tablets (6.25 mg total) by mouth 2 (two) times daily. 120 tablet 11    cetirizine (ZYRTEC) 10 MG tablet Take 10 mg by mouth daily as needed (itching).      cloNIDine 0.3 mg/24 hr td ptwk (CATAPRES) 0.3 mg/24 hr Place 1 patch onto the skin every Saturday.      FLUoxetine 40 MG capsule Take 40 mg by mouth once daily.      fluticasone-salmeterol diskus inhaler 250-50 mcg Inhale 1 puff into the lungs 2 (two) times daily.      GLUCAGON EMERGENCY KIT, HUMAN, 1 mg injection 1 mg into the muscle as needed if CBG < 70      hydrALAZINE (APRESOLINE) 100 MG tablet Take 1 tablet (100 mg total) by mouth every 8 (eight) hours.      HYDROPHILIC CREAM TOP Apply topically. Apply liberal amount to affected area twice daily for dry skin      insulin aspart U-100 (NOVOLOG) 100 unit/mL (3 mL) InPn pen Inject 5 Units into the skin 3 (three) times daily with meals. 12 mL 3    insulin detemir U-100 (LEVEMIR FLEXTOUCH) 100 unit/mL (3 mL) SubQ InPn pen Inject 12 Units into the skin once daily. 12 mL 3    isosorbide mononitrate (IMDUR) 30 MG 24 hr tablet Take 30 mg by mouth 2 (two) times daily.      melatonin 3 mg TbDL Take 9 mg  "by mouth nightly as needed (sleep).      NIFEdipine (PROCARDIA-XL) 90 MG (OSM) 24 hr tablet Take 1 tablet (90 mg total) by mouth once daily. 30 tablet 11    pen needle, diabetic 29 gauge x 1/2" Ndle bd pen 100 each 0    pen needle, diabetic 32 gauge x 5/32" Ndle Use to inject 1 each into the skin 5 (five) times daily. 100 each 0    sodium chloride (OCEAN) 0.65 % nasal spray 2 sprays by Nasal route every 4 (four) hours as needed for Congestion.      tiotropium bromide (SPIRIVA RESPIMAT) 2.5 mcg/actuation inhaler Inhale 2 puffs into the lungs Daily.      triamcinolone acetonide 0.025 % Lotn Apply topically. Apply to the affected area twice daily      vitamin renal formula, B-complex-vitamin c-folic acid, (NEPHROCAP) 1 mg Cap Take 1 capsule by mouth once daily.         PMH:  As per HPI and below:  Past Medical History:   Diagnosis Date    Chronic kidney disease-mineral and bone disorder 10/12/2022    COPD (chronic obstructive pulmonary disease)     Diabetes mellitus type 1     ESRD on dialysis     Hypertension     SOB (shortness of breath) 10/12/2022    Patient with history COPD treated with Ellipta the albuterol, fluticasone-salmeterol tachypneic in the ED saturating 94% and 6 L on nasal cannula, patient does not know how many  he uses it is in nursing home.    -duo nebs Q 4  Given that patient is a poor historian, and in the setting of left lower extremity edema and history of coagulopathy PE cannot be ruled out.  Will workup for possible infec     Past Surgical History:   Procedure Laterality Date    AV FISTULA PLACEMENT         Social History     Socioeconomic History    Marital status:    Tobacco Use    Smoking status: Never    Smokeless tobacco: Never   Substance and Sexual Activity    Alcohol use: Not Currently    Sexual activity: Not Currently       Family History   Problem Relation Age of Onset    Diabetes Mother        Physical Exam:      Vitals:    01/05/23 0400   BP: (!) 169/78   Pulse: 74   Resp: 17 "   Temp:      Gen: No acute distress.  Disheveled, malodorous.  Mental Status:  Alert and oriented.  Appropriate, conversant.  Skin: Warm, dry. No rashes seen.  Eyes: No conjunctival injection.  Pulm: CTAB. No increased work of breathing.  No significant tachypnea.  No audible stridor or wheezing.  No conversational dyspnea.    CV: Regular rate. Regular rhythm.   Abd: Soft.  Not distended.  Nontender.   MSK: Good range of motion all joints.  No deformities.    Neuro: Awake. Speech normal. No focal neuro deficit observed.    Laboratory Studies:  Labs Reviewed   CBC W/ AUTO DIFFERENTIAL - Abnormal; Notable for the following components:       Result Value    RBC 2.88 (*)     Hemoglobin 8.2 (*)     Hematocrit 26.5 (*)     MCHC 30.9 (*)     RDW 15.4 (*)     Platelets 132 (*)     Lymph # 0.4 (*)     Gran % 80.9 (*)     Lymph % 9.0 (*)     All other components within normal limits   COMPREHENSIVE METABOLIC PANEL - Abnormal; Notable for the following components:    Sodium 134 (*)     Glucose 634 (*)     BUN 30 (*)     Creatinine 3.7 (*)     Calcium 8.3 (*)     Albumin 2.8 (*)     Alkaline Phosphatase 171 (*)     eGFR 18.0 (*)     All other components within normal limits   BETA - HYDROXYBUTYRATE, SERUM - Abnormal; Notable for the following components:    Beta-Hydroxybutyrate 2.4 (*)     All other components within normal limits   COMPREHENSIVE METABOLIC PANEL - Abnormal; Notable for the following components:    Glucose 557 (*)     BUN 30 (*)     Creatinine 3.9 (*)     Calcium 8.2 (*)     Albumin 2.8 (*)     Alkaline Phosphatase 170 (*)     eGFR 16.9 (*)     All other components within normal limits   CBC W/ AUTO DIFFERENTIAL - Abnormal; Notable for the following components:    RBC 2.94 (*)     Hemoglobin 8.4 (*)     Hematocrit 27.0 (*)     MCHC 31.1 (*)     RDW 15.1 (*)     Platelets 134 (*)     Lymph # 0.5 (*)     Gran % 78.5 (*)     Lymph % 11.5 (*)     All other components within normal limits   POCT GLUCOSE - Abnormal;  "Notable for the following components:    POCT Glucose >500 (*)     All other components within normal limits   ISTAT PROCEDURE - Abnormal; Notable for the following components:    POC PH 7.464 (*)     POC PO2 61 (*)     POC HCO3 28.1 (*)     POC SATURATED O2 92 (*)     All other components within normal limits   POCT GLUCOSE - Abnormal; Notable for the following components:    POCT Glucose >500 (*)     All other components within normal limits   POCT GLUCOSE - Abnormal; Notable for the following components:    POCT Glucose >500 (*)     All other components within normal limits   POCT GLUCOSE - Abnormal; Notable for the following components:    POCT Glucose >500 (*)     All other components within normal limits   ISTAT PROCEDURE - Abnormal; Notable for the following components:    POC Glucose 512 (*)     POC Creatinine 3.7 (*)     POC Chloride 93 (*)     POC Ionized Calcium 1.05 (*)     POC Hematocrit 29 (*)     All other components within normal limits   MAGNESIUM   PHOSPHORUS   URINALYSIS, REFLEX TO URINE CULTURE   POCT GLUCOSE, HAND-HELD DEVICE   POCT GLUCOSE MONITORING CONTINUOUS   POCT GLUCOSE MONITORING CONTINUOUS       EKG (independently interpreted by me):  Normal sinus rhythm, rate 72.  No STEMI.  QRS what 102 milliseconds.   milliseconds.    Chart reviewed.     Imaging Results              X-Ray Chest AP Portable (Final result)  Result time 01/04/23 23:53:56      Final result by Aguilar Garcia MD (01/04/23 23:53:56)                   Impression:      Cardiomegaly with bilateral airspace disease and possible small pleural effusions.  Aeration is similar to mildly worse when compared with 12/21/2022.      Electronically signed by: Aguilar Garcia MD  Date:    01/04/2023  Time:    23:53               Narrative:    EXAMINATION:  XR CHEST AP PORTABLE    CLINICAL HISTORY:  Provided history is "hyperglycemia;  ".    TECHNIQUE:  One view of the chest.    COMPARISON:  12/21/2022 and " 12/19/2022.    FINDINGS:  Patient is rotated.  Lung volumes are low.  Cardiomediastinal silhouette is enlarged and similar to the prior study.  There is central vascular congestion with patchy and diffuse bilateral airspace opacities and prominent interstitial lung markings.  Small right and trace left pleural effusions suspected.  No distinct pneumothorax.  Aeration is similar but mildly worse when compared with the prior study.                                      Medications Given:  Medications   sodium chloride 0.9% flush 10 mL (has no administration in time range)   dextrose 10 % infusion (has no administration in time range)   dextrose 10 % infusion (has no administration in time range)   dextrose 10% bolus 125 mL 125 mL (has no administration in time range)   dextrose 10% bolus 250 mL 250 mL (has no administration in time range)   insulin regular in 0.9 % NaCl 100 unit/100 mL (1 unit/mL) infusion (6 Units/hr Intravenous Rate/Dose Change 1/5/23 1843)   sodium chloride 0.9% flush 10 mL (has no administration in time range)   albuterol-ipratropium 2.5 mg-0.5 mg/3 mL nebulizer solution 3 mL (has no administration in time range)   melatonin tablet 6 mg (has no administration in time range)   ondansetron disintegrating tablet 8 mg (has no administration in time range)   promethazine tablet 25 mg (has no administration in time range)   polyethylene glycol packet 17 g (has no administration in time range)   bisacodyL suppository 10 mg (has no administration in time range)   acetaminophen tablet 650 mg (has no administration in time range)   insulin aspart U-100 pen 0-5 Units (has no administration in time range)   glucose chewable tablet 16 g (has no administration in time range)   glucose chewable tablet 24 g (has no administration in time range)   glucagon (human recombinant) injection 1 mg (has no administration in time range)   dextrose 10% bolus 125 mL 125 mL (has no administration in time range)   dextrose 10%  bolus 250 mL 250 mL (has no administration in time range)   apixaban tablet 5 mg (has no administration in time range)   aspirin EC tablet 81 mg (has no administration in time range)   atorvastatin tablet 40 mg (has no administration in time range)   carvediloL tablet 6.25 mg (has no administration in time range)   cetirizine tablet 10 mg (has no administration in time range)   cloNIDine 0.3 mg/24 hr td ptwk 1 patch (has no administration in time range)   FLUoxetine capsule 40 mg (has no administration in time range)   fluticasone furoate-vilanteroL 100-25 mcg/dose diskus inhaler 1 puff (has no administration in time range)   hydrALAZINE tablet 100 mg (has no administration in time range)   isosorbide mononitrate 24 hr tablet 30 mg (has no administration in time range)   NIFEdipine 24 hr tablet 90 mg (has no administration in time range)   vitamin renal formula (B-complex-vitamin c-folic acid) 1 mg per capsule 1 capsule (has no administration in time range)   tiotropium bromide 2.5 mcg/actuation inhaler 2 puff (has no administration in time range)   sodium chloride 0.9% bolus 250 mL 250 mL (0 mLs Intravenous Stopped 1/5/23 1770)       Discussed with: IM    MDM:    57 y.o. male with hyperglycemia.  Initial fingerstick blood glucose here greater than 500.  He is dialysis dependent and it is unclear if he makes urine.  He received a very small fluid bolus.      His labs reviewed.  No anion gap metabolic acidosis.  Blood glucose 634.  He does have elevation of his beta hydroxybutyrate.  Will start on insulin drip given his renal failure, inability to give fluids, and nursing homes inability to control the sugar despite insulin given at the nursing home.      Case discussed with internal medicine.    Diagnostic Impression:    1. Hyperglycemia    2. Chest pain         ED Disposition Condition    Observation Stable               Patientnderstands the plan and is in agreement, verbalized understanding, questions  answered    Tere Cowan MD  Emergency Medicine         Tere Cowan MD  01/05/23 2363

## 2023-01-05 NOTE — ASSESSMENT & PLAN NOTE
Patient on chronic O2 (~BL 2-5 L)  - continue home O2 sats, keep >88%  - continue home LAMA and LABA  - duo nebs PRN

## 2023-01-05 NOTE — HOSPITAL COURSE
Admitted with hyperglycemia. Started on an insulin gtt on admission. Endocrinology consulted. Transitioned from insulin gtt to sub q. Discharged on long acting 10u daily + aspart 5u TID + low dose SSI. Received iHD before discharge back to SNF    Hyperglycemia  Patient presenting to the ED from his nursing home for hyperglycemia w/o signs of DKA  - Glucose 634 on admit  - beta-hydroxybutyrate 2.4, bicarb 28.1, anion gap 11  - last a1c 7.5  - s/p 12 units of levemir and 10 units novolog by EMS  - repeat glucose >500  - insulin drip initiated in the ED, transitioned to sub Q 1/6  - POCT every hour until controlled  - Endocrinology consulted, appreciate assistance for home regimen  - monitor blood glucose levels     HLD (hyperlipidemia)  - continue statin     Renovascular hypertension  - continue imdur, hydralazine, coreg, nifedipine and clonidine     Anemia in ESRD (end-stage renal disease)  Hgb 8.2 (~BL 8-10)  - monitor daily CBC  - transfuse <7  - Nephrology consulted     Multiple subsegmental pulmonary emboli without acute cor pulmonale  - continue eliquis and aspirin     Other emphysema  Patient on chronic O2 (~BL 2-5 L)  - continue home O2 sats, keep >88%  - continue home LAMA and LABA  - duo nebs PRN     Diabetes mellitus with chronic kidney disease on chronic dialysis  Patient's FSGs are uncontrolled due to hyperglycemia on current medication regimen.  Last A1c reviewed-         Lab Results   Component Value Date     HGBA1C 7.5 (H) 12/20/2022      Most recent fingerstick glucose reviewed-         Recent Labs   Lab 01/04/23  2305 01/05/23  0056 01/05/23  0329   POCTGLUCOSE >500* >500* >500*      Current correctional scale  Low  Maintain anti-hyperglycemic dose as follows-             Antihyperglycemics (From admission, onward)     Start     Stop Route Frequency Ordered     01/05/23 0309   insulin aspart U-100 pen 0-5 Units         -- SubQ Before meals & nightly PRN 01/05/23 0211     01/05/23 0300   insulin  regular in 0.9 % NaCl 100 unit/100 mL (1 unit/mL) infusion        Question Answer Comment   Insulin Rate Adjustment (DO NOT MODIFY ANSWER) [email or web address]     Enter initial dose from Infusion Protocol Chart (Units/hr): 4         -- IV Continuous 01/05/23 0155          Hold Oral hypoglycemics while patient is in the hospital.     ESRD on dialysis  CKD with mineral bone disorder  Chronic dialysis MWF  - last session on Wednesday  - Nephrology consulted, appreciate recs

## 2023-01-05 NOTE — NURSING
Received patient to room from ED @ 0618, patient alert and oriented x 3, repositioned in bed, tele monitor noted. Insulin gtt noted @ 6 units/hr and verified handoff with ED nurse. Patient oriented to room, updated on plan of care, will continue to monitor.

## 2023-01-05 NOTE — PLAN OF CARE
Patient seen and examined on AM rounds. He is awake and alert, in no distress. Endocrine and renal following. Transition insulin gtt initiated this afternoon. Patient Poing. Plan  of care discussed and questions answered. See admission H&P for more detailed history and plan

## 2023-01-05 NOTE — SUBJECTIVE & OBJECTIVE
Past Medical History:   Diagnosis Date    Chronic kidney disease-mineral and bone disorder 10/12/2022    COPD (chronic obstructive pulmonary disease)     Diabetes mellitus type 1     ESRD on dialysis     Hypertension     SOB (shortness of breath) 10/12/2022    Patient with history COPD treated with Ellipta the albuterol, fluticasone-salmeterol tachypneic in the ED saturating 94% and 6 L on nasal cannula, patient does not know how many  he uses it is in nursing home.    -duo nebs Q 4  Given that patient is a poor historian, and in the setting of left lower extremity edema and history of coagulopathy PE cannot be ruled out.  Will workup for possible infec       Past Surgical History:   Procedure Laterality Date    AV FISTULA PLACEMENT         Review of patient's allergies indicates:  No Known Allergies    No current facility-administered medications on file prior to encounter.     Current Outpatient Medications on File Prior to Encounter   Medication Sig    acetaminophen (TYLENOL) 650 MG TbSR Take 650 mg by mouth every 8 (eight) hours as needed (pain or fever over 100.4).    albuterol (PROVENTIL/VENTOLIN HFA) 90 mcg/actuation inhaler Inhale 2 puffs into the lungs every 6 (six) hours as needed for Wheezing or Shortness of Breath.    apixaban (ELIQUIS) 5 mg Tab Take 1 tablet (5 mg total) by mouth 2 (two) times daily. Start this dose AFTER you have completed the 7 days of the 10 mg dose.    aspirin (ECOTRIN) 81 MG EC tablet Take 81 mg by mouth once daily.    atorvastatin (LIPITOR) 40 MG tablet Take 1 tablet (40 mg total) by mouth once daily.    carvediloL (COREG) 3.125 MG tablet Take 2 tablets (6.25 mg total) by mouth 2 (two) times daily.    cetirizine (ZYRTEC) 10 MG tablet Take 10 mg by mouth daily as needed (itching).    cloNIDine 0.3 mg/24 hr td ptwk (CATAPRES) 0.3 mg/24 hr Place 1 patch onto the skin every Saturday.    FLUoxetine 40 MG capsule Take 40 mg by mouth once daily.    fluticasone-salmeterol diskus inhaler  "250-50 mcg Inhale 1 puff into the lungs 2 (two) times daily.    GLUCAGON EMERGENCY KIT, HUMAN, 1 mg injection 1 mg into the muscle as needed if CBG < 70    hydrALAZINE (APRESOLINE) 100 MG tablet Take 1 tablet (100 mg total) by mouth every 8 (eight) hours.    HYDROPHILIC CREAM TOP Apply topically. Apply liberal amount to affected area twice daily for dry skin    insulin aspart U-100 (NOVOLOG) 100 unit/mL (3 mL) InPn pen Inject 5 Units into the skin 3 (three) times daily with meals.    insulin detemir U-100 (LEVEMIR FLEXTOUCH) 100 unit/mL (3 mL) SubQ InPn pen Inject 12 Units into the skin once daily.    isosorbide mononitrate (IMDUR) 30 MG 24 hr tablet Take 30 mg by mouth 2 (two) times daily.    melatonin 3 mg TbDL Take 9 mg by mouth nightly as needed (sleep).    NIFEdipine (PROCARDIA-XL) 90 MG (OSM) 24 hr tablet Take 1 tablet (90 mg total) by mouth once daily.    pen needle, diabetic 29 gauge x 1/2" Ndle bd pen    pen needle, diabetic 32 gauge x 5/32" Ndle Use to inject 1 each into the skin 5 (five) times daily.    sodium chloride (OCEAN) 0.65 % nasal spray 2 sprays by Nasal route every 4 (four) hours as needed for Congestion.    tiotropium bromide (SPIRIVA RESPIMAT) 2.5 mcg/actuation inhaler Inhale 2 puffs into the lungs Daily.    triamcinolone acetonide 0.025 % Lotn Apply topically. Apply to the affected area twice daily    vitamin renal formula, B-complex-vitamin c-folic acid, (NEPHROCAP) 1 mg Cap Take 1 capsule by mouth once daily.     Family History       Problem Relation (Age of Onset)    Diabetes Mother          Tobacco Use    Smoking status: Never    Smokeless tobacco: Never   Substance and Sexual Activity    Alcohol use: Not Currently    Drug use: Not on file    Sexual activity: Not Currently     Review of Systems   Constitutional:  Negative for chills and fever.   HENT:  Negative for congestion, postnasal drip, rhinorrhea and sinus pain.    Eyes:  Negative for visual disturbance.   Respiratory:  Negative " for chest tightness and shortness of breath.    Cardiovascular:  Negative for chest pain and leg swelling.   Gastrointestinal:  Negative for abdominal pain, constipation, diarrhea, nausea and vomiting.   Endocrine: Negative for polydipsia, polyphagia and polyuria.   Genitourinary:  Negative for dysuria, frequency and urgency.   Musculoskeletal:  Negative for arthralgias, back pain, gait problem and myalgias.   Neurological:  Negative for dizziness, tremors, weakness, numbness and headaches.   Objective:     Vital Signs (Most Recent):  Temp: 98 °F (36.7 °C) (01/04/23 2300)  Pulse: 72 (01/05/23 0101)  Resp: 16 (01/05/23 0101)  BP: (!) 173/82 (01/05/23 0050)  SpO2: (!) 90 % (01/05/23 0101)   Vital Signs (24h Range):  Temp:  [98 °F (36.7 °C)] 98 °F (36.7 °C)  Pulse:  [71-75] 72  Resp:  [14-18] 16  SpO2:  [90 %-97 %] 90 %  BP: (149-173)/(71-82) 173/82        There is no height or weight on file to calculate BMI.    Physical Exam  Vitals and nursing note reviewed.   Constitutional:       General: He is not in acute distress.     Appearance: He is well-developed.   HENT:      Head: Normocephalic and atraumatic.      Mouth/Throat:      Pharynx: No oropharyngeal exudate.   Eyes:      Conjunctiva/sclera: Conjunctivae normal.      Pupils: Pupils are equal, round, and reactive to light.   Cardiovascular:      Rate and Rhythm: Normal rate and regular rhythm.      Heart sounds: Normal heart sounds.   Pulmonary:      Effort: Pulmonary effort is normal. No respiratory distress.      Breath sounds: Normal breath sounds. No wheezing.   Abdominal:      General: Bowel sounds are normal. There is no distension.      Palpations: Abdomen is soft.      Tenderness: There is no abdominal tenderness.   Musculoskeletal:         General: No tenderness. Normal range of motion.      Cervical back: Normal range of motion and neck supple.   Lymphadenopathy:      Cervical: No cervical adenopathy.   Skin:     General: Skin is warm and dry.       Capillary Refill: Capillary refill takes less than 2 seconds.      Findings: No rash.      Comments: LUE HD access site     Neurological:      Mental Status: He is alert and oriented to person, place, and time.      Cranial Nerves: No cranial nerve deficit.      Sensory: No sensory deficit.      Coordination: Coordination normal.   Psychiatric:         Behavior: Behavior normal.         Thought Content: Thought content normal.         Judgment: Judgment normal.         CRANIAL NERVES     CN III, IV, VI   Pupils are equal, round, and reactive to light.     Significant Labs: All pertinent labs within the past 24 hours have been reviewed.  BMP:   Recent Labs   Lab 01/05/23 0047   *   *   K 3.8   CL 95   CO2 28   BUN 30*   CREATININE 3.7*   CALCIUM 8.3*     CBC:   Recent Labs   Lab 01/05/23 0047   WBC 4.12   HGB 8.2*   HCT 26.5*   *       Significant Imaging: I have reviewed all pertinent imaging results/findings within the past 24 hours.

## 2023-01-05 NOTE — PLAN OF CARE
Nick Menjivar - Telemetry Stepdown  Initial Discharge Assessment       Primary Care Provider: Primary Doctor No    Admission Diagnosis: Hyperglycemia [R73.9]  Chest pain [R07.9]    Admission Date: 1/4/2023  Expected Discharge Date: 1/6/2023    Discharge Barriers Identified: None    Payor: MEDICARE / Plan: MEDICARE PART A & B / Product Type: Government /     Extended Emergency Contact Information  Primary Emergency Contact: Kassandra Leon  Mobile Phone: [phone]  Relation: Mother    Discharge Plan A: Return to nursing home ([facility] (resident))  Discharge Plan B: Return to Nursing Home    No Pharmacies Listed    Initial Assessment (most recent)       Adult Discharge Assessment - 01/05/23 1437          Discharge Assessment    Assessment Type Discharge Planning Assessment     Source of Information patient     Reason For Admission Hyperglycemia     People in Home facility resident     Facility Arrived From: [facility]     Do you expect to return to your current living situation? Yes     Do you have help at home or someone to help you manage your care at home? Yes     Prior to hospitilization cognitive status: Alert/Oriented     Current cognitive status: Alert/Oriented     Walking or Climbing Stairs ambulation difficulty, requires equipment     Dressing/Bathing bathing difficulty, assistance 1 person     Readmission within 30 days? Yes   [place] [facility] [place]    Patient currently being followed by outpatient case management? No     Do you currently have service(s) that help you manage your care at home? No     Do you take prescription medications? Yes     Do you have prescription coverage? Yes     Do you have any problems affording any of your prescribed medications? No     Is the patient taking medications as prescribed? yes     Are you on dialysis? Yes     Dialysis Name and Scheduled days [facility]/F     Do you take coumadin? No     Discharge Plan A Return to nursing home   [facility] (resident)    Discharge Plan B Return to Nursing Home     DME Needed Upon Discharge  none     Discharge Plan discussed with: Patient     Discharge Barriers Identified None                   Abby Estrella RN  Ext [phone]

## 2023-01-05 NOTE — ASSESSMENT & PLAN NOTE
Endocrinology consulted for BG management.   BG goal 140-180     - D/C IIP  - Weight-based at 0.4 units/kg/day  - Transition drip at 0.6 units/hr with step-down parameters.   - Novolog (aspart) insulin 5 Units SQ TIDWM and prn for BG excursions LDC SSI (150/50).  - BG checks q4hr  - Hypoglycemia protocol in place    ** Please notify Endocrine for any change and/or advance in diet**  ** Please call Endocrine for any BG related issues **    Discharge Planning:   TBD. Please notify endocrinology prior to discharge.

## 2023-01-05 NOTE — ED NOTES
Telemetry Verification   Patient placed on Telemetry Box  Verified with War Room  Box # [id number]   Rate 69   Rhythm NSR

## 2023-01-05 NOTE — SUBJECTIVE & OBJECTIVE
Past Medical History:   Diagnosis Date    Chronic kidney disease-mineral and bone disorder 10/12/2022    COPD (chronic obstructive pulmonary disease)     Diabetes mellitus type 1     ESRD on dialysis     Hypertension     SOB (shortness of breath) 10/12/2022    Patient with history COPD treated with Ellipta the albuterol, fluticasone-salmeterol tachypneic in the ED saturating 94% and 6 L on nasal cannula, patient does not know how many  he uses it is in nursing home.    -duo nebs Q 4  Given that patient is a poor historian, and in the setting of left lower extremity edema and history of coagulopathy PE cannot be ruled out.  Will workup for possible infec       Past Surgical History:   Procedure Laterality Date    AV FISTULA PLACEMENT         Review of patient's allergies indicates:  No Known Allergies  Current Facility-Administered Medications   Medication Frequency    [START ON 1/6/2023] 0.9%  NaCl infusion Once    acetaminophen tablet 650 mg Q4H PRN    albuterol-ipratropium 2.5 mg-0.5 mg/3 mL nebulizer solution 3 mL Q4H PRN    apixaban tablet 5 mg BID    aspirin EC tablet 81 mg Daily    atorvastatin tablet 40 mg Daily    bisacodyL suppository 10 mg Daily PRN    carvediloL tablet 6.25 mg BID    cetirizine tablet 10 mg Daily PRN    [START ON 1/7/2023] cloNIDine 0.3 mg/24 hr td ptwk 1 patch Every Sat    dextrose 10 % infusion PRN    dextrose 10 % infusion PRN    dextrose 10% bolus 125 mL 125 mL PRN    dextrose 10% bolus 125 mL 125 mL PRN    dextrose 10% bolus 250 mL 250 mL PRN    dextrose 10% bolus 250 mL 250 mL PRN    FLUoxetine capsule 40 mg Daily    fluticasone furoate-vilanteroL 100-25 mcg/dose diskus inhaler 1 puff Daily    glucagon (human recombinant) injection 1 mg PRN    glucose chewable tablet 16 g PRN    glucose chewable tablet 24 g PRN    hydrALAZINE tablet 100 mg Q8H    insulin aspart U-100 pen 0-5 Units QID (AC + HS) PRN    insulin regular in 0.9 % NaCl 100 unit/100 mL (1 unit/mL) infusion Continuous     isosorbide mononitrate 24 hr tablet 30 mg BID    melatonin tablet 6 mg Nightly PRN    NIFEdipine 24 hr tablet 90 mg Daily    ondansetron disintegrating tablet 8 mg Q8H PRN    polyethylene glycol packet 17 g Daily PRN    promethazine tablet 25 mg Q6H PRN    sodium chloride 0.9% flush 10 mL PRN    sodium chloride 0.9% flush 10 mL PRN    tiotropium bromide 2.5 mcg/actuation inhaler 2 puff Daily    vitamin renal formula (B-complex-vitamin c-folic acid) 1 mg per capsule 1 capsule Daily     Family History       Problem Relation (Age of Onset)    Diabetes Mother          Tobacco Use    Smoking status: Never    Smokeless tobacco: Never   Substance and Sexual Activity    Alcohol use: Not Currently    Drug use: Not on file    Sexual activity: Not Currently     Review of Systems   Constitutional:  Negative for chills and fever.   HENT:  Negative for congestion and sinus pain.    Eyes:  Negative for visual disturbance.   Respiratory:  Negative for chest tightness and shortness of breath.    Cardiovascular:  Negative for chest pain and leg swelling.   Gastrointestinal:  Negative for abdominal pain, constipation, diarrhea, nausea and vomiting.   Genitourinary:  Negative for dysuria, frequency and urgency.   Musculoskeletal:  Negative for arthralgias, back pain, gait problem and myalgias.   Skin:  Negative for wound.   Neurological:  Negative for dizziness, tremors, weakness, numbness and headaches.   Psychiatric/Behavioral:  Negative for agitation, behavioral problems, confusion and decreased concentration.    Objective:     Vital Signs (Most Recent):  Temp: 97.9 °F (36.6 °C) (01/05/23 1014)  Pulse: (!) 56 (01/05/23 1022)  Resp: 18 (01/05/23 1014)  BP: (!) 173/84 (01/05/23 1014)  SpO2: 100 % (01/05/23 1014)   Vital Signs (24h Range):  Temp:  [97.9 °F (36.6 °C)-98 °F (36.7 °C)] 97.9 °F (36.6 °C)  Pulse:  [56-76] 56  Resp:  [12-18] 18  SpO2:  [90 %-100 %] 100 %  BP: (149-185)/(71-88) 173/84     Weight: 80 kg (176 lb 5.9 oz)  (01/05/23 1014)  Body mass index is 26.05 kg/m².  Body surface area is 1.97 meters squared.    I/O last 3 completed shifts:  In: 250 [IV Piggyback:250]  Out: -     Physical Exam  Vitals and nursing note reviewed.   Constitutional:       General: He is not in acute distress.     Appearance: He is well-developed.   HENT:      Head: Normocephalic and atraumatic.      Mouth/Throat:      Pharynx: No oropharyngeal exudate.   Eyes:      Conjunctiva/sclera: Conjunctivae normal.      Pupils: Pupils are equal, round, and reactive to light.   Cardiovascular:      Rate and Rhythm: Normal rate and regular rhythm.      Heart sounds: Normal heart sounds.   Pulmonary:      Effort: Pulmonary effort is normal. No respiratory distress.      Breath sounds: Normal breath sounds. No wheezing.   Abdominal:      General: Bowel sounds are normal. There is no distension.      Palpations: Abdomen is soft.      Tenderness: There is no abdominal tenderness.   Musculoskeletal:         General: No tenderness. Normal range of motion.      Cervical back: Normal range of motion and neck supple.   Lymphadenopathy:      Cervical: No cervical adenopathy.   Skin:     General: Skin is warm and dry.      Capillary Refill: Capillary refill takes less than 2 seconds.      Findings: No rash.      Comments: LUE HD access site     Neurological:      Mental Status: He is alert and oriented to person, place, and time.      Cranial Nerves: No cranial nerve deficit.      Sensory: No sensory deficit.      Coordination: Coordination normal.   Psychiatric:         Behavior: Behavior normal.         Thought Content: Thought content normal.         Judgment: Judgment normal.       Significant Labs:  CBC:   Recent Labs   Lab 01/05/23  0432 01/05/23  0445   WBC 4.17  --    RBC 2.94*  --    HGB 8.4*  --    HCT 27.0* 29*   *  --    MCV 92  --    MCH 28.6  --    MCHC 31.1*  --      CMP:   Recent Labs   Lab 01/05/23  0432   *   CALCIUM 8.2*   ALBUMIN 2.8*    PROT 6.3      K 3.8   CO2 27   CL 96   BUN 30*   CREATININE 3.9*   ALKPHOS 170*   ALT 28   AST 21   BILITOT 0.6     All labs within the past 24 hours have been reviewed.

## 2023-01-06 NOTE — PLAN OF CARE
NURSING HOME ORDERS    01/06/2023  [facility]  BELLA COLLINS - TELEMETRY STEPDOWN  [address]  [facility] [address]  Dept: [phone]  Loc: [phone]     Admit to Nursing Home:  Skilled Nursing Facility    Diagnoses:  Active Hospital Problems    Diagnosis  POA    *Hyperglycemia [R73.9]  Yes    HLD (hyperlipidemia) [E78.5]  Yes    Anemia in ESRD (end-stage renal disease) [N18.6, D63.1]  Yes    Renovascular hypertension [I15.0]  Yes    Multiple subsegmental pulmonary emboli without acute cor pulmonale [I26.94]  Yes    Diabetes mellitus with chronic kidney disease on chronic dialysis [E10.22, N18.6, Z99.2]  Not Applicable    Chronic kidney disease-mineral and bone disorder [N18.9, E83.9, M89.9]  Yes    Other emphysema [J43.8]  Yes    ESRD on dialysis [N18.6, Z99.2]  Not Applicable      Resolved Hospital Problems   No resolved problems to display.       Patient is homebound due to:  Hyperglycemia    Allergies:Review of patient's allergies indicates:  No Known Allergies    Vitals:  Routine    Diet: cardiac diet, diabetic diet: 2000 calorie, and renal diet    Activities:   Activity as tolerated    Goals of Care Treatment Preferences:  Code Status: Full Code      Labs:  PRN. Glucose before meals and nightly    Nursing Precautions:  Fall and Pressure ulcer prevention    Consults:   PT to evaluate and treat- 3 times a week, OT to evaluate and treat- 3 times a week, and Nutrition to evaluate and recommend diet     Miscellaneous Care: Diabetes Care: Diabetes: Check blood sugar. Fingerstick blood sugar AC and HS  Sliding Scale/Hypoglycemia Protocol: For FSG<80, give 1 amp D50 or 1 glucose tablet. For FSG , do nothing. For -200, give 1 unit of novolog in addition to pre-meal insulin. For -250, give 2 units of novolog in addition to pre-meal insulin. For -300, give 3 units of novolog in addition to pre-meal insulin. For 301-350, give 4 units of novolog in addition  to pre-meal insulin. For 351-400, give 5 units of novolog in addition to pre-meal insulin. For FSG >400, give 5 units of novolog in addition to pre-meal insulin and please call MD                   Diabetes Care:  SN to perform and educate Diabetic management with blood glucose monitoring:, Fingerstick blood sugar AC and HS, and Report CBG < 60 or > 350 to physician.    Outpatient HD Information:  -Dialysis modality: Hemodialysis  -Outpatient HD unit: [facility]   -Nephrologist: Dr. Holden   -HD TX days: Monday/Wednesday/Friday, duration of treatment: 4 hrs  -Dialysis access: LUE AV fistula   -EDW: 78.5 kg    Medications: Discontinue all previous medication orders, if any. See new list below.     Medication List        CHANGE how you take these medications      insulin detemir U-100 100 unit/mL (3 mL) Inpn pen  Commonly known as: Levemir FLEXTOUCH  Inject 10 Units into the skin once daily.  What changed: how much to take            CONTINUE taking these medications      acetaminophen 650 MG Tbsr  Commonly known as: TYLENOL  Take 650 mg by mouth every 8 (eight) hours as needed (pain or fever over 100.4).     albuterol 90 mcg/actuation inhaler  Commonly known as: PROVENTIL/VENTOLIN HFA  Inhale 2 puffs into the lungs every 6 (six) hours as needed for Wheezing or Shortness of Breath.     apixaban 5 mg Tab  Commonly known as: ELIQUIS  Take 1 tablet (5 mg total) by mouth 2 (two) times daily. Start this dose AFTER you have completed the 7 days of the 10 mg dose.     aspirin 81 MG EC tablet  Commonly known as: ECOTRIN  Take 81 mg by mouth once daily.     atorvastatin 40 MG tablet  Commonly known as: LIPITOR  Take 1 tablet (40 mg total) by mouth once daily.     carvediloL 3.125 MG tablet  Commonly known as: COREG  Take 2 tablets (6.25 mg total) by mouth 2 (two) times daily.     cetirizine 10 MG tablet  Commonly known as: ZYRTEC  Take 10 mg by mouth daily as needed (itching).     cloNIDine 0.3 mg/24 hr td ptwk 0.3 mg/24  "hr  Commonly known as: CATAPRES  Place 1 patch onto the skin every Saturday.     FLUoxetine 40 MG capsule  Take 40 mg by mouth once daily.     fluticasone-salmeterol 250-50 mcg/dose 250-50 mcg/dose diskus inhaler  Commonly known as: ADVAIR  Inhale 1 puff into the lungs 2 (two) times daily.     GLUCAGON EMERGENCY KIT (HUMAN) 1 mg Solr  Generic drug: glucagon  1 mg into the muscle as needed if CBG < 70     hydrALAZINE 100 MG tablet  Commonly known as: APRESOLINE  Take 1 tablet (100 mg total) by mouth every 8 (eight) hours.     HYDROPHILIC CREAM TOP  Apply topically. Apply liberal amount to affected area twice daily for dry skin     insulin aspart U-100 100 unit/mL (3 mL) Inpn pen  Commonly known as: NovoLOG  Inject 5 Units into the skin 3 (three) times daily with meals.     isosorbide mononitrate 30 MG 24 hr tablet  Commonly known as: IMDUR  Take 30 mg by mouth 2 (two) times daily.     melatonin 3 mg Tbdl  Take 9 mg by mouth nightly as needed (sleep).     NIFEdipine 90 MG (OSM) 24 hr tablet  Commonly known as: PROCARDIA-XL  Take 1 tablet (90 mg total) by mouth once daily.     sodium chloride 0.65 % nasal spray  Commonly known as: OCEAN  2 sprays by Nasal route every 4 (four) hours as needed for Congestion.     tiotropium bromide 2.5 mcg/actuation inhaler  Commonly known as: SPIRIVA RESPIMAT  Inhale 2 puffs into the lungs Daily.     triamcinolone acetonide 0.025 % Lotn  Apply topically. Apply to the affected area twice daily     vitamin renal formula (B-complex-vitamin c-folic acid) 1 mg Cap  Commonly known as: NEPHROCAP  Take 1 capsule by mouth once daily.            STOP taking these medications      BD ULTRA-FINE RODRÍGUEZ PEN NEEDLE 32 gauge x 5/32" Ndle  Generic drug: pen needle, diabetic     pen needle, diabetic 29 gauge x 1/2" Ndle                Immunizations Administered as of 1/6/2023       No immunizations on file.            This patient has had both covid vaccinations    Some patients may experience side " effects after vaccination.  These may include fever, headache, muscle or joint aches.  Most symptoms resolve with 24-48 hours and do not require urgent medical evaluation unless they persist for more than 72 hours or symptoms are concerning for an unrelated medical condition.          _________________________________  May EMMA Mao MD  01/06/2023

## 2023-01-06 NOTE — ASSESSMENT & PLAN NOTE
Titrate insulin slowly to avoid hypoglycemia as the risk of hypoglycemia increases with decreased creatinine clearance.    Estimated Creatinine Clearance: 17.7 mL/min (A) (based on SCr of 4.5 mg/dL (H)).

## 2023-01-06 NOTE — SUBJECTIVE & OBJECTIVE
"Interval HPI:   Overnight events: No acute events overnight. Patient on the MTSU in room 8097/8097 A. Blood glucose stable. BG at and below goal on current insulin regimen (Transition Insulin Drip). Steroid use- None.    Renal function- Abnormal - HD   Vasopressors-  None       Endocrine will continue to follow and manage insulin orders inpatient.         Diet diabetic [facility];  Calorie; Renal; Isolation Tray - Regular [place]     Eatin%  Nausea: No  Hypoglycemia and intervention: No  Fever: No  TPN and/or TF: No      BP (!) 157/79   Pulse 65   Temp 97.5 °F (36.4 °C)   Resp 18   Ht 5' 9" (1.753 m)   Wt 80 kg (176 lb 5.9 oz)   SpO2 (!) 92%   BMI 26.05 kg/m²     Labs Reviewed and Include    Recent Labs   Lab 23  0525      CALCIUM 8.4*   ALBUMIN 2.8*   PROT 6.2      K 3.8   CO2 31*   CL 99   BUN 35*   CREATININE 4.5*   ALKPHOS 174*   ALT 27   AST 39   BILITOT 0.6     Lab Results   Component Value Date    WBC 2.81 (L) 2023    HGB 9.3 (L) 2023    HCT 30.9 (L) 2023    MCV 93 2023     2023     No results for input(s): TSH, FREET4 in the last 168 hours.  Lab Results   Component Value Date    HGBA1C 7.5 (H) 2022       Nutritional status:   Body mass index is 26.05 kg/m².  Lab Results   Component Value Date    ALBUMIN 2.8 (L) 2023    ALBUMIN 2.9 (L) 2023    ALBUMIN 2.8 (L) 2023     No results found for: PREALBUMIN    Estimated Creatinine Clearance: 17.7 mL/min (A) (based on SCr of 4.5 mg/dL (H)).    Accu-Checks  Recent Labs     23  0938 23  1039 23  1130 23  1300 23  1625 23  1646 23  2018 23  0006 23  0418 23  0827   POCTGLUCOSE 219* 177* 159* 176* 172* 167* 114* 153* 115* 111*       Current Medications and/or Treatments Impacting Glycemic Control  Immunotherapy:    Immunosuppressants       None          Steroids:   Hormones (From admission, onward)      Start   "   Stop Route Frequency Ordered    01/05/23 0309  melatonin tablet 6 mg         -- Oral Nightly PRN 01/05/23 0211          Pressors:    Autonomic Drugs (From admission, onward)      None          Hyperglycemia/Diabetes Medications:   Antihyperglycemics (From admission, onward)      Start     Stop Route Frequency Ordered    01/06/23 1030  insulin detemir U-100 pen 10 Units         -- SubQ Daily 01/06/23 0919    01/05/23 1645  insulin aspart U-100 pen 5 Units         -- SubQ 3 times daily with meals 01/05/23 1241    01/05/23 1341  insulin aspart U-100 pen 0-5 Units         -- SubQ As needed (PRN) 01/05/23 1241

## 2023-01-06 NOTE — PLAN OF CARE
Patient accepted for return to [facility] today.   Patient is currently in dialysis.   Ambulance stretcher requested with [facility] (ext [phone]) for 7 PM pickup time. Patient is going to room 209B.   Report may be called to [phone]

## 2023-01-06 NOTE — ASSESSMENT & PLAN NOTE
Endocrinology consulted for BG management.   BG goal 140-180     - Weight-based at 0.4 units/kg/day  - Levemir 10 units QD (based on needs while on transition inuslin drip)  - Novolog (aspart) insulin 5 Units SQ TIDWM and prn for BG excursions LDC SSI (150/50).  - BG checks q4hr  - Hypoglycemia protocol in place    ** Please notify Endocrine for any change and/or advance in diet**  ** Please call Endocrine for any BG related issues **    Discharge Planning:   - Levemir (or insurance preferred basal insulin) 10 units QD  - Novolog 5 units TIDWM + SSI  - Novolog SSI:  150 - 200 + 1 unit  201 - 250 + 2 units  251 - 300 + 3 units  301 - 350 + 4 units   > 350   + 5 units

## 2023-01-06 NOTE — PLAN OF CARE
Problem: Adult Inpatient Plan of Care  Goal: Plan of Care Review  Outcome: Ongoing, Not Progressing  Goal: Patient-Specific Goal (Individualized)  Outcome: Ongoing, Not Progressing  Goal: Absence of Hospital-Acquired Illness or Injury  Outcome: Ongoing, Not Progressing  Goal: Optimal Comfort and Wellbeing  Outcome: Ongoing, Not Progressing  Goal: Readiness for Transition of Care  Outcome: Ongoing, Not Progressing     Problem: Infection  Goal: Absence of Infection Signs and Symptoms  Outcome: Ongoing, Not Progressing     Problem: Diabetes Comorbidity  Goal: Blood Glucose Level Within Targeted Range  Outcome: Ongoing, Not Progressing     Problem: Skin Injury Risk Increased  Goal: Skin Health and Integrity  Outcome: Ongoing, Not Progressing     Problem: Device-Related Complication Risk (Hemodialysis)  Goal: Safe, Effective Therapy Delivery  Outcome: Ongoing, Not Progressing     Problem: Hemodynamic Instability (Hemodialysis)  Goal: Effective Tissue Perfusion  Outcome: Ongoing, Not Progressing     Problem: Infection (Hemodialysis)  Goal: Absence of Infection Signs and Symptoms  Outcome: Ongoing, Not Progressing

## 2023-01-06 NOTE — PROGRESS NOTES
[facility] NOTE     Patient currently on hemodialysis for removal of uremic toxins .     Patient seen and evaluated on hemodialysis, tolerating treatment, see HD flowsheet for vitals and assessments.      No Hypotension, chest pain, shortness of breath, cramping, nausea or vomiting.      Plans for dc after HD.     Target UF 3 L as tolerated, keep MAP >65.    Anemia: Hgb 9.3. EPO can be administered and dosed per his OP unit upon discharge.    MBD: Phos 3.2. No binders. Continue renal diet.     SHANTELL Grant DNP, APRN, FNP-C  Department of Nephrology  [facility]  Pager: [phone]

## 2023-01-06 NOTE — NURSING
A/A/O X3. Disoriented to place at time of am round. Calling out for breakfast frequently, redirected and snack provided. Tele in place with sinus rhythm present. Insulin gtt infusing at 0.3 units/hr. Accuchecks q4hr. Dialysis days MWF. Reviewed labs and poc. NADN at present.

## 2023-01-06 NOTE — PLAN OF CARE
Problem: Adult Inpatient Plan of Care  Goal: Plan of Care Review  Outcome: Ongoing, Not Progressing  Goal: Patient-Specific Goal (Individualized)  Outcome: Ongoing, Not Progressing  Goal: Absence of Hospital-Acquired Illness or Injury  Outcome: Ongoing, Not Progressing  Goal: Optimal Comfort and Wellbeing  Outcome: Ongoing, Not Progressing  Goal: Readiness for Transition of Care  Outcome: Ongoing, Not Progressing     Problem: Infection  Goal: Absence of Infection Signs and Symptoms  Outcome: Ongoing, Not Progressing     Problem: Diabetes Comorbidity  Goal: Blood Glucose Level Within Targeted Range  Outcome: Ongoing, Not Progressing     Problem: Skin Injury Risk Increased  Goal: Skin Health and Integrity  Outcome: Ongoing, Not Progressing     Problem: Device-Related Complication Risk (Hemodialysis)  Goal: Safe, Effective Therapy Delivery  Outcome: Ongoing, Not Progressing     Problem: Hemodynamic Instability (Hemodialysis)  Goal: Effective Tissue Perfusion  Outcome: Ongoing, Not Progressing     A/o x3. NADN this shift. Discharge planning in progress. Will transfer to NH after dialysis.

## 2023-01-06 NOTE — PROGRESS NOTES
"[facility] - Telemetry Stepdown  Endocrinology  Progress Note    Admit Date: 2023     Reason for Consult: Management of T2DM, Hyperglycemia     Diabetes diagnosis year: 2018    Home Diabetes Medications:    Levemir 15 units BID  Novolog 10 units TIDAC  Low dose sliding scale insulin    How often checking glucose at home? >4 x day   BG readings on regimen: "Good-range"  Hypoglycemia on the regimen?  No  Missed doses on regimen?  No    Diabetes Complications include:     Hyperglycemia, Diabetic chronic kidney disease     , and Diabetic retinopathy     Complicating diabetes co morbidities:   CKD and ESRD      HPI:   Cosme Lewis is a 57 y.o. male with a hx of ESRD on HD, DM, HTN, COPD & chronic hypoxemic respiratory failure (2-5L @ baseline) anemia, and hx of DVT/PE on Eliquis presents to the ED from his nursing home for hyperglycemia. During routine blood sugar checks, staff noted reading was high. Per EMS sugar was 564. Patient denies knowing what was going on and stated he feels fine. He just heard his sugar was high and was brought here. Denies headache, blurry vision, polydipsia, polyphagia, chest pain, SOB, abdominal pain, constipation and diarrhea. Patient endorses still being able to make urine. Endocrine consulted to manage diabetes and hyperglycemia.     Lab Results   Component Value Date    HGBA1C 7.5 (H) 2022                 Interval HPI:   Overnight events: No acute events overnight. Patient on the MTSU in room 8097/8097 A. Blood glucose stable. BG at and below goal on current insulin regimen (Transition Insulin Drip). Steroid use- None.    Renal function- Abnormal - HD   Vasopressors-  None       Endocrine will continue to follow and manage insulin orders inpatient.         Diet diabetic [facility];  Calorie; Renal; Isolation Tray - Regular [place]     Eatin%  Nausea: No  Hypoglycemia and intervention: No  Fever: No  TPN and/or TF: No      BP (!) 157/79   Pulse 65   Temp 97.5 °F " "(36.4 °C)   Resp 18   Ht 5' 9" (1.753 m)   Wt 80 kg (176 lb 5.9 oz)   SpO2 (!) 92%   BMI 26.05 kg/m²     Labs Reviewed and Include    Recent Labs   Lab 01/06/23  0525      CALCIUM 8.4*   ALBUMIN 2.8*   PROT 6.2      K 3.8   CO2 31*   CL 99   BUN 35*   CREATININE 4.5*   ALKPHOS 174*   ALT 27   AST 39   BILITOT 0.6     Lab Results   Component Value Date    WBC 2.81 (L) 01/06/2023    HGB 9.3 (L) 01/06/2023    HCT 30.9 (L) 01/06/2023    MCV 93 01/06/2023     01/06/2023     No results for input(s): TSH, FREET4 in the last 168 hours.  Lab Results   Component Value Date    HGBA1C 7.5 (H) 12/20/2022       Nutritional status:   Body mass index is 26.05 kg/m².  Lab Results   Component Value Date    ALBUMIN 2.8 (L) 01/06/2023    ALBUMIN 2.9 (L) 01/05/2023    ALBUMIN 2.8 (L) 01/05/2023     No results found for: PREALBUMIN    Estimated Creatinine Clearance: 17.7 mL/min (A) (based on SCr of 4.5 mg/dL (H)).    Accu-Checks  Recent Labs     01/05/23  0938 01/05/23  1039 01/05/23  1130 01/05/23  1300 01/05/23  1625 01/05/23  1646 01/05/23  2018 01/06/23  0006 01/06/23  0418 01/06/23  0827   POCTGLUCOSE 219* 177* 159* 176* 172* 167* 114* 153* 115* 111*       Current Medications and/or Treatments Impacting Glycemic Control  Immunotherapy:    Immunosuppressants       None          Steroids:   Hormones (From admission, onward)      Start     Stop Route Frequency Ordered    01/05/23 0309  melatonin tablet 6 mg         -- Oral Nightly PRN 01/05/23 0211          Pressors:    Autonomic Drugs (From admission, onward)      None          Hyperglycemia/Diabetes Medications:   Antihyperglycemics (From admission, onward)      Start     Stop Route Frequency Ordered    01/06/23 1030  insulin detemir U-100 pen 10 Units         -- SubQ Daily 01/06/23 0919    01/05/23 1645  insulin aspart U-100 pen 5 Units         -- SubQ 3 times daily with meals 01/05/23 1241    01/05/23 1341  insulin aspart U-100 pen 0-5 Units         -- SubQ " As needed (PRN) 01/05/23 1241            ASSESSMENT and PLAN    * Hyperglycemia  See DM note      Diabetes mellitus with chronic kidney disease on chronic dialysis  Endocrinology consulted for BG management.   BG goal 140-180     - Weight-based at 0.4 units/kg/day  - Levemir 10 units QD (based on needs while on transition inuslin drip)  - Novolog (aspart) insulin 5 Units SQ TIDWM and prn for BG excursions LDC SSI (150/50).  - BG checks q4hr  - Hypoglycemia protocol in place    ** Please notify Endocrine for any change and/or advance in diet**  ** Please call Endocrine for any BG related issues **    Discharge Planning:   - Levemir (or insurance preferred basal insulin) 10 units QD  - Novolog 5 units TIDWM + SSI  - Novolog SSI:  150 - 200 + 1 unit  201 - 250 + 2 units  251 - 300 + 3 units  301 - 350 + 4 units   > 350   + 5 units        ESRD on dialysis  Titrate insulin slowly to avoid hypoglycemia as the risk of hypoglycemia increases with decreased creatinine clearance.    Estimated Creatinine Clearance: 17.7 mL/min (A) (based on SCr of 4.5 mg/dL (H)).        Anemia in ESRD (end-stage renal disease)  Anemia may impact the accuracy of the A1C.           Robbie Alexander, DNP, FNP  Endocrinology  [facility] - Telemetry Stepdown

## 2023-01-06 NOTE — DISCHARGE SUMMARY
[facility] - Telemetry [facility] Medicine  Discharge Summary      Patient Name: Cosme Lewis  MRN: [id number]  MARCY: [id number]  Patient Class: OP- Observation  Admission Date: 1/4/2023  Hospital Length of Stay: 0 days  Discharge Date and Time: No discharge date for patient encounter.  Attending Physician: Nette Mao MD   Discharging Provider: Nette Mao MD  Primary Care Provider: Primary Doctor [facility] Medicine Team: [facility] HOSP MED G Nette Mao MD  Primary Care Team: [facility] HOSP MED     HPI:   Cosme Lewis is a 57 y.o. male with a hx of ESRD on HD, DM, HTN, COPD & chronic hypoxemic respiratory failure (2-5L @ baseline), COPD & chronic hypoxemic respiratory failure (2-5L @ baseline), anemia, and hx of DVT/PE on Eliquis presents to the ED from his nursing home for hyperglycemia. During routine blood sugar checks, staff noted reading was high. Per EMS sugar was 564. Patient denies knowing what was going on and stated he feels fine. Denies headache, blurry vision, polydipsia, polyphagia, chest pain, SOB, abdominal pain, constipation and diarrhea. Patient endorses still being able to make urine.     ED: AF. /71. Hgb 8.2. Na 134. Glucose 634. Cr 3.7. Beta-hydroxybutyrate 2.4. CXR showed cardiomegaly with bilateral airspace disease and possible small pleural effusions.  Aeration is similar to mildly worse when compared with 12/21/2022. Given 12mg  Levomir and 10mg Novolog by EMS. Insulin drip started in ED.      * No surgery found *      Hospital Course:   Admitted with hyperglycemia. Started on an insulin gtt on admission. Endocrinology consulted. Transitioned from insulin gtt to sub q. Discharged on long acting 10u daily + aspart 5u TID + low dose SSI. Received iHD before discharge back to SNF    Hyperglycemia  Patient presenting to the ED from his nursing home for hyperglycemia w/o signs of DKA  - Glucose 634 on admit  - beta-hydroxybutyrate 2.4, bicarb 28.1, anion gap 11  - last a1c  7.5  - s/p 12 units of levemir and 10 units novolog by EMS  - repeat glucose >500  - insulin drip initiated in the ED, transitioned to sub Q 1/6  - POCT every hour until controlled  - Endocrinology consulted, appreciate assistance for home regimen  - monitor blood glucose levels     HLD (hyperlipidemia)  - continue statin     Renovascular hypertension  - continue imdur, hydralazine, coreg, nifedipine and clonidine     Anemia in ESRD (end-stage renal disease)  Hgb 8.2 (~BL 8-10)  - monitor daily CBC  - transfuse <7  - Nephrology consulted     Multiple subsegmental pulmonary emboli without acute cor pulmonale  - continue eliquis and aspirin     Other emphysema  Patient on chronic O2 (~BL 2-5 L)  - continue home O2 sats, keep >88%  - continue home LAMA and LABA  - duo nebs PRN     Diabetes mellitus with chronic kidney disease on chronic dialysis  Patient's FSGs are uncontrolled due to hyperglycemia on current medication regimen.  Last A1c reviewed-         Lab Results   Component Value Date     HGBA1C 7.5 (H) 12/20/2022      Most recent fingerstick glucose reviewed-         Recent Labs   Lab 01/04/23  2305 01/05/23  0056 01/05/23  0329   POCTGLUCOSE >500* >500* >500*      Current correctional scale  Low  Maintain anti-hyperglycemic dose as follows-             Antihyperglycemics (From admission, onward)     Start     Stop Route Frequency Ordered     01/05/23 0309   insulin aspart U-100 pen 0-5 Units         -- SubQ Before meals & nightly PRN 01/05/23 0211     01/05/23 0300   insulin regular in 0.9 % NaCl 100 unit/100 mL (1 unit/mL) infusion        Question Answer Comment   Insulin Rate Adjustment (DO NOT MODIFY ANSWER) [email or web address]     Enter initial dose from Infusion Protocol Chart (Units/hr): 4         -- IV Continuous 01/05/23 0155          Hold Oral hypoglycemics while patient is in the hospital.     ESRD on dialysis  CKD with mineral bone  disorder  Chronic dialysis MWF  - last session on Wednesday  - Nephrology consulted, appreciate recs         Goals of Care Treatment Preferences:  Code Status: Full Code      Consults:   Consults (From admission, onward)        Status Ordering Provider     IP consult to case management  Once        Provider:  (Not yet assigned)    Acknowledged UDAY HUI     Inpatient consult to Nephrology  Once        Provider:  (Not yet assigned)    Completed KAE SEWELL     Inpatient consult to Endocrinology  Once        Provider:  (Not yet assigned)    Completed KAE SEWELL          No new Assessment & Plan notes have been filed under this hospital service since the last note was generated.  Service: Hospital Medicine    Final Active Diagnoses:    Diagnosis Date Noted POA    PRINCIPAL PROBLEM:  Hyperglycemia [R73.9] 01/05/2023 Yes    HLD (hyperlipidemia) [E78.5] 11/18/2022 Yes    Anemia in ESRD (end-stage renal disease) [N18.6, D63.1] 11/18/2022 Yes    Renovascular hypertension [I15.0] 11/18/2022 Yes    Multiple subsegmental pulmonary emboli without acute cor pulmonale [I26.94] 10/13/2022 Yes    Diabetes mellitus with chronic kidney disease on chronic dialysis [E10.22, N18.6, Z99.2] 10/12/2022 Not Applicable    Chronic kidney disease-mineral and bone disorder [N18.9, E83.9, M89.9] 10/12/2022 Yes    Other emphysema [J43.8] 10/12/2022 Yes    ESRD on dialysis [N18.6, Z99.2]  Not Applicable      Problems Resolved During this Admission:       Discharged Condition: good    Disposition: Skilled Nursing Facility    Follow Up:    Patient Instructions:      Ambulatory referral/consult to Endocrinology   Standing Status: Future   Referral Priority: Routine Referral Type: Consultation   Requested Specialty: Endocrinology   Number of Visits Requested: 1     Diet diabetic     Diet Cardiac     Diet renal     Notify your health care provider if you experience any of the following:  temperature >100.4     Notify  your health care provider if you experience any of the following:  persistent nausea and vomiting or diarrhea     Notify your health care provider if you experience any of the following:  severe uncontrolled pain     Notify your health care provider if you experience any of the following:  difficulty breathing or increased cough     Notify your health care provider if you experience any of the following:  severe persistent headache     Notify your health care provider if you experience any of the following:  worsening rash     Notify your health care provider if you experience any of the following:  persistent dizziness, light-headedness, or visual disturbances     Notify your health care provider if you experience any of the following:  increased confusion or weakness     Activity as tolerated       Significant Diagnostic Studies: Labs: All labs within the past 24 hours have been reviewed    Pending Diagnostic Studies:     Procedure Component Value Units Date/Time    COVID-19 Routine Screening Extended Care Placement [[id number]] Collected: 01/06/23 1001    Order Status: Sent Lab Status: In process Updated: 01/06/23 1018    Specimen: Nasopharyngeal          Medications:  Reconciled Home Medications:      Medication List      CHANGE how you take these medications    insulin detemir U-100 100 unit/mL (3 mL) Inpn pen  Commonly known as: Levemir FLEXTOUCH  Inject 10 Units into the skin once daily.  What changed: how much to take        CONTINUE taking these medications    acetaminophen 650 MG Tbsr  Commonly known as: TYLENOL  Take 650 mg by mouth every 8 (eight) hours as needed (pain or fever over 100.4).     albuterol 90 mcg/actuation inhaler  Commonly known as: PROVENTIL/VENTOLIN HFA  Inhale 2 puffs into the lungs every 6 (six) hours as needed for Wheezing or Shortness of Breath.     apixaban 5 mg Tab  Commonly known as: ELIQUIS  Take 1 tablet (5 mg total) by mouth 2 (two) times daily. Start this dose AFTER you have  completed the 7 days of the 10 mg dose.     aspirin 81 MG EC tablet  Commonly known as: ECOTRIN  Take 81 mg by mouth once daily.     atorvastatin 40 MG tablet  Commonly known as: LIPITOR  Take 1 tablet (40 mg total) by mouth once daily.     carvediloL 3.125 MG tablet  Commonly known as: COREG  Take 2 tablets (6.25 mg total) by mouth 2 (two) times daily.     cetirizine 10 MG tablet  Commonly known as: ZYRTEC  Take 10 mg by mouth daily as needed (itching).     cloNIDine 0.3 mg/24 hr td ptwk 0.3 mg/24 hr  Commonly known as: CATAPRES  Place 1 patch onto the skin every Saturday.     FLUoxetine 40 MG capsule  Take 40 mg by mouth once daily.     fluticasone-salmeterol 250-50 mcg/dose 250-50 mcg/dose diskus inhaler  Commonly known as: ADVAIR  Inhale 1 puff into the lungs 2 (two) times daily.     GLUCAGON EMERGENCY KIT (HUMAN) 1 mg Solr  Generic drug: glucagon  1 mg into the muscle as needed if CBG < 70     hydrALAZINE 100 MG tablet  Commonly known as: APRESOLINE  Take 1 tablet (100 mg total) by mouth every 8 (eight) hours.     HYDROPHILIC CREAM TOP  Apply topically. Apply liberal amount to affected area twice daily for dry skin     insulin aspart U-100 100 unit/mL (3 mL) Inpn pen  Commonly known as: NovoLOG  Inject 5 Units into the skin 3 (three) times daily with meals.     isosorbide mononitrate 30 MG 24 hr tablet  Commonly known as: IMDUR  Take 30 mg by mouth 2 (two) times daily.     melatonin 3 mg Tbdl  Take 9 mg by mouth nightly as needed (sleep).     NIFEdipine 90 MG (OSM) 24 hr tablet  Commonly known as: PROCARDIA-XL  Take 1 tablet (90 mg total) by mouth once daily.     sodium chloride 0.65 % nasal spray  Commonly known as: OCEAN  2 sprays by Nasal route every 4 (four) hours as needed for Congestion.     tiotropium bromide 2.5 mcg/actuation inhaler  Commonly known as: SPIRIVA RESPIMAT  Inhale 2 puffs into the lungs Daily.     triamcinolone acetonide 0.025 % Lotn  Apply topically. Apply to the affected area twice  "daily     vitamin renal formula (B-complex-vitamin c-folic acid) 1 mg Cap  Commonly known as: NEPHROCAP  Take 1 capsule by mouth once daily.        STOP taking these medications    BD ULTRA-FINE RODRÍGUEZ PEN NEEDLE 32 gauge x 5/32" Ndle  Generic drug: pen needle, diabetic     pen needle, diabetic 29 gauge x 1/2" Ndle            Indwelling Lines/Drains at time of discharge:   Lines/Drains/Airways     Drain  Duration                Hemodialysis AV Fistula Left upper arm -- days                Time spent on the discharge of patient: 30 minutes         Nette Mao MD  Department of Hospital Medicine  [facility] - Telemetry Stepdown  "

## 2023-01-06 NOTE — PROGRESS NOTES
Patient received in the [facility] via bed. Observation  dialysis started via 15 gauge fistula needles to UMA fistula.

## 2023-01-07 NOTE — PROGRESS NOTES
Dialysis completed. Needles removed from UMA fistula with manual pressure held to sites for 10 minutes each with hemostasis achieved. Pressure dressing applied. Patient dialyzed for 3 hours with fluid removal of 3 liters. Tolerated well with stable vital signs. Patient returned to his room via bed.

## 2023-01-07 NOTE — PLAN OF CARE
[facility] - Telemetry Stepdown  Discharge Final Note    Primary Care Provider: Primary Doctor No    Expected Discharge Date: 1/6/2023    Final Discharge Note (most recent)       Final Note - 01/07/23 0810          Final Note    Assessment Type Final Discharge Note     Anticipated Discharge Disposition [facility]    [facility]       Post-Acute Status    Post-Acute Authorization Placement     Post-Acute Placement Status Set-up Complete/Auth obtained                     Important Message from Medicare

## 2023-01-07 NOTE — NURSING
Received pt from HD in bed. AAOX4. Telemetry monitor discontinue. No complaint of discomfort or pain. Bed alarm. Call light in reach. Bed in lowest position.

## 2023-01-08 NOTE — ED PROVIDER NOTES
Encounter Date: 1/8/2023       History     Chief Complaint   Patient presents with    Vascular Access Problem     Pt arrives from [facility]. Pt has a bleeding left arm shunt for 3 days.      57-year-old male with medical comorbidities notable for ESRD on dialysis, COPD, diabetes type 1 presents to the ED with vascular access problem.  Per EMS, the patient has had bleeding from his AV fistula in the left upper extremity for the past 3 days.  Patient is a resident of [facility].  He is on Eliquis.  Receives dialysis MWF and did receive full treatment Fri (2 days ago).  Patient has no additional acute complaints.       Review of patient's allergies indicates:  No Known Allergies  Past Medical History:   Diagnosis Date    Chronic kidney disease-mineral and bone disorder 10/12/2022    COPD (chronic obstructive pulmonary disease)     Diabetes mellitus type 1     ESRD on dialysis     Hypertension     SOB (shortness of breath) 10/12/2022    Patient with history COPD treated with Ellipta the albuterol, fluticasone-salmeterol tachypneic in the ED saturating 94% and 6 L on nasal cannula, patient does not know how many  he uses it is in nursing home.    -duo nebs Q 4  Given that patient is a poor historian, and in the setting of left lower extremity edema and history of coagulopathy PE cannot be ruled out.  Will workup for possible infec     Past Surgical History:   Procedure Laterality Date    AV FISTULA PLACEMENT       Family History   Problem Relation Age of Onset    Diabetes Mother      Social History     Tobacco Use    Smoking status: Never    Smokeless tobacco: Never   Substance Use Topics    Alcohol use: Not Currently     Review of Systems   Constitutional:  Negative for fever.   HENT:  Negative for sore throat.    Respiratory:  Negative for shortness of breath.    Cardiovascular:  Negative for chest pain.   Gastrointestinal:  Negative for nausea.   Genitourinary:  Negative for dysuria.   Musculoskeletal:   Negative for back pain.   Skin:  Negative for rash.   Neurological:  Negative for weakness.   Hematological:  Bruises/bleeds easily.     Physical Exam     Initial Vitals [01/08/23 1033]   BP Pulse Resp Temp SpO2   (!) 180/89 76 18 98.4 °F (36.9 °C) 97 %      MAP       --         Physical Exam    Nursing note and vitals reviewed.  Constitutional: He appears well-developed and well-nourished.  Non-toxic appearance. He does not appear ill. No distress.   Chronically ill-appearing male   HENT:   Head: Normocephalic and atraumatic.   Neck: Neck supple.   Normal range of motion.  Cardiovascular:  Normal rate and regular rhythm.     Exam reveals no gallop, no distant heart sounds and no friction rub.       No murmur heard.  Pulmonary/Chest: Effort normal and breath sounds normal. No accessory muscle usage. No tachypnea. No respiratory distress. He has no decreased breath sounds. He has no wheezes. He has no rhonchi. He has no rales.   Abdominal: He exhibits no distension.   Musculoskeletal:      Cervical back: Normal range of motion and neck supple.      Comments: Compression dressing noted to left upper arm.  Dressing with dried blood.  Dressing was removed.  Large AV fistula noted with palpable thrill.  No active bleeding.      Neurological: He is alert.   Skin: No rash noted.       ED Course   Procedures  Labs Reviewed   CBC W/ AUTO DIFFERENTIAL - Abnormal; Notable for the following components:       Result Value    WBC 3.16 (*)     RBC 3.48 (*)     Hemoglobin 9.9 (*)     Hematocrit 31.9 (*)     MCHC 31.0 (*)     RDW 15.9 (*)     Lymph # 0.6 (*)     All other components within normal limits   ISTAT PROCEDURE - Abnormal; Notable for the following components:    POC Glucose 130 (*)     POC BUN 58 (*)     POC Creatinine 5.3 (*)     POC Sodium 135 (*)     POC Ionized Calcium 1.01 (*)     All other components within normal limits   ISTAT CHEM8          Imaging Results    None          Medications - No data to  display  Medical Decision Making:   History:   Old Medical Records: I decided to obtain old medical records.  Initial Assessment:   57-year-old male presents to the ED with bleeding from his AV fistula for the past 3 days.  Sent from nursing home.  Hypertensive but otherwise hemodynamically stable.  Differential Diagnosis:   My differential diagnosis includes but is not limited to:  Uncontrollable bleeding, anemia, electrolyte abnormality, uremia, volume overload  Clinical Tests:   Lab Tests: Ordered  ED Management:  See ED course notes below.           Attending Attestation:     Physician Attestation Statement for NP/PA:   I have conducted a face to face encounter with this patient in addition to the NP/PA, due to Medical Complexity    Other NP/PA Attestation Additions:    History of Present Illness: 57-year-old male presenting for evaluation of bleeding dialysis access.  On Eliquis.  Patient denies any symptoms.   Physical Exam: Left upper extremity LUE fistula with audible bruit, palpable thrill, no overlying erythema, no bleeding.  No respiratory distress, RRR.   No respiratory distress, stable on NC   Medical Decision Making: Observed without bleeding recurrence.  No HyperK, hemoglobin stable.  No need for emergent dialysis.  Safe for d/c back to NH, dialysis scheduled for tomorrow.  No respiratory distress, no hypoxia during observation period (I was not informed of abnormal pulse ox PTD, suspect recorded pulse ox to be erroneous).              ED Course as of 01/08/23 1751   Sun Jan 08, 2023   1236 Multiple layered pressure dressing noted over patient's AV fistula to the left upper extremity.  No active bleeding noted from the fistula.  Will place a regular dressing and monitor for bleeding.  Will obtain basic labs. [EH]   1250 Hemoglobin(!): 9.9  Stable anemia  [AB]   1250 POC Creatinine(!): 5.3 [AB]   1250 POC Potassium: 4.9  Normal  [AB]   1434 Pt was monitored in the ED for >3hrs with no significant  bleeding noted from AVF.  No concerning electrolyte abnormalities.  Stable anemia.  Will plan to discharge back to nursing home.  Patient acute to receive dialysis as scheduled tomorrow.  [EH]      ED Course User Index  [AB] Stephen Riddle MD  [EH] Blanca Sears PA-C                   Clinical Impression:   Final diagnoses:  [N18.6, Z99.2] ESRD (end stage renal disease) on dialysis (Primary)        ED Disposition Condition    Discharge Stable          ED Prescriptions    None       Follow-up Information    None          Blanca Sears PA-C  01/08/23 1751       Stephen Riddle MD  01/09/23 0555       Stephen Riddle MD  01/09/23 0716

## 2023-01-08 NOTE — ED TRIAGE NOTES
"Pt reports L arm vascular access bleeding for the past three days. Pt denies pain, dizziness, nausea, or vomiting. Pt  states he goes to dialysis on MWF and last had dialysis on Friday. Pt currently has left arm wrapped with ace bandaged and R arm wrapped in gauzed. Pt states "R arm was wrapped in hospital office two days ago." Pt reports hx of of HTN, DM, COPD and Kidney disease. Pt AAOx4. Pt placed on cont puls ox and cardiac monitor.   "

## 2023-01-08 NOTE — ED NOTES
I-STAT Chem-8+ Results:   Value Reference Range   Sodium 1385 136-145 mmol/L   Potassium  4.9 3.5-5.1 mmol/L   Chloride 101  mmol/L   Ionized Calcium 1.01 1.06-1.42 mmol/L   CO2 (measured) 27 23-29 mmol/L   Glucose 130  mg/dL   BUN 58 6-30 mg/dL   Creatinine 5.3 0.5-1.4 mg/dL   Hematocrit 36 36-54%

## 2023-01-08 NOTE — DISCHARGE INSTRUCTIONS
DO NOT USE COBAN on patient's dialysis access.  If there is bleeding, please place ONLY A COMPRESSION DRESSING ONLY TO THE SMALL AREA OF BLEEDING.     There was no significant bleeding from dialysis access in the ED. Patient is good to go to dialysis TOMORROW.    Return patient to the ER if there is significant recurrent bleeding from the area or for any other worrisome symptoms.

## 2023-02-10 NOTE — ED TRIAGE NOTES
Pt arrives from dialysis with redness/swelling noted in and around his right eye. He denies facial trauma, change in vision, pain

## 2023-02-10 NOTE — ED PROVIDER NOTES
Chief Complaint   Eye Drainage (From dialysis, providers there noted right eye irritation, red drainage. Pt reports eye symptoms started approx 2 days ago. Denies vision changes, pain, trauma. )      History Of Present Illness   Cosme Lewis is a 57 y.o. male with ESRD on HD, here from dialysis where they noted his eye was very red and draining. He reports that he's been sleeping on his right eye and its been that way for months (pt has been here several times recently and it has not been noted on exam). He takes eliquis. He has no complaints, denies vision changes. Reports he wears glasses but does not have them with him. His only complaint is hunger. History is limited 2/2 pt's underlying cognitive disorder.     I attempted to call [facility] for collateral, who said that nothing was reported about any eye issues and that she knows him well and hasnt noticed any abnormal issues. Per RN here who took report from dialysis center, they saw him on his last HD session 2d ago and his eye was normal.     History obtained from: chart review and RN from [facility]'s    Review of patient's allergies indicates:  No Known Allergies    No current facility-administered medications on file prior to encounter.     Current Outpatient Medications on File Prior to Encounter   Medication Sig Dispense Refill    acetaminophen (TYLENOL) 650 MG TbSR Take 650 mg by mouth every 8 (eight) hours as needed (pain or fever over 100.4).      albuterol (PROVENTIL/VENTOLIN HFA) 90 mcg/actuation inhaler Inhale 2 puffs into the lungs every 6 (six) hours as needed for Wheezing or Shortness of Breath.      aspirin (ECOTRIN) 81 MG EC tablet Take 81 mg by mouth once daily.      atorvastatin (LIPITOR) 40 MG tablet Take 1 tablet (40 mg total) by mouth once daily. 90 tablet 3    carvediloL (COREG) 3.125 MG tablet Take 2 tablets (6.25 mg total) by mouth 2 (two) times daily. 120 tablet 11    cetirizine (ZYRTEC) 10 MG tablet Take 10 mg by mouth  daily as needed (itching).      cloNIDine 0.3 mg/24 hr td ptwk (CATAPRES) 0.3 mg/24 hr Place 1 patch onto the skin every Saturday.      FLUoxetine 40 MG capsule Take 40 mg by mouth once daily.      fluticasone-salmeterol diskus inhaler 250-50 mcg Inhale 1 puff into the lungs 2 (two) times daily.      GLUCAGON EMERGENCY KIT, HUMAN, 1 mg injection 1 mg into the muscle as needed if CBG < 70      hydrALAZINE (APRESOLINE) 100 MG tablet Take 1 tablet (100 mg total) by mouth every 8 (eight) hours.      HYDROPHILIC CREAM TOP Apply topically. Apply liberal amount to affected area twice daily for dry skin      insulin aspart U-100 (NOVOLOG) 100 unit/mL (3 mL) InPn pen Inject 5 Units into the skin 3 (three) times daily with meals. 12 mL 3    insulin detemir U-100 (LEVEMIR FLEXTOUCH) 100 unit/mL (3 mL) SubQ InPn pen Inject 10 Units into the skin once daily. 12 mL 3    isosorbide mononitrate (IMDUR) 30 MG 24 hr tablet Take 30 mg by mouth 2 (two) times daily.      melatonin 3 mg TbDL Take 9 mg by mouth nightly as needed (sleep).      NIFEdipine (PROCARDIA-XL) 90 MG (OSM) 24 hr tablet Take 1 tablet (90 mg total) by mouth once daily. 30 tablet 11    sodium chloride (OCEAN) 0.65 % nasal spray 2 sprays by Nasal route every 4 (four) hours as needed for Congestion.      tiotropium bromide (SPIRIVA RESPIMAT) 2.5 mcg/actuation inhaler Inhale 2 puffs into the lungs Daily.      triamcinolone acetonide 0.025 % Lotn Apply topically. Apply to the affected area twice daily      vitamin renal formula, B-complex-vitamin c-folic acid, (NEPHROCAP) 1 mg Cap Take 1 capsule by mouth once daily.         Past History   As per HPI and below:  Past Medical History:   Diagnosis Date    Chronic kidney disease-mineral and bone disorder 10/12/2022    COPD (chronic obstructive pulmonary disease)     Diabetes mellitus type 1     ESRD on dialysis     Hypertension     SOB (shortness of breath) 10/12/2022    Patient with history COPD treated with Ellipta the  albuterol, fluticasone-salmeterol tachypneic in the ED saturating 94% and 6 L on nasal cannula, patient does not know how many  he uses it is in nursing home.    -duo nebs Q 4  Given that patient is a poor historian, and in the setting of left lower extremity edema and history of coagulopathy PE cannot be ruled out.  Will workup for possible infec     Past Surgical History:   Procedure Laterality Date    AV FISTULA PLACEMENT         Social History     Socioeconomic History    Marital status:    Tobacco Use    Smoking status: Never    Smokeless tobacco: Never   Substance and Sexual Activity    Alcohol use: Not Currently    Sexual activity: Not Currently       Family History   Problem Relation Age of Onset    Diabetes Mother        Physical Exam     Vitals:    02/10/23 1641 02/10/23 1902 02/10/23 1908 02/10/23 1950   BP:  (!) 184/95     BP Location:       Patient Position:       Pulse:  72  70   Resp:       Temp: 98.1 °F (36.7 °C)      TempSrc: Oral      SpO2:   95% 95%   Weight:         Gen: AxOx4, NAD, appears stated age, well appearing  Eye: EOMI, perrl, significant conjunctival hemorrhage with hemorrhagic chemosis, no obvious hyphema or hypopion. IOP OD 22 and 23. VA 20/50 bilaterally. No obvious abrasion or dustin sign on fluorescein exam. no scleral icterus, no periorbital edema or ecchymosis  Head: Normocephalic, atraumatic, no lesions, scalp grossly normal  ENT: neck supple, no stridor, no masses, no abnormal phonation or drooling  CVS: warm and well perfused, cap refill <2 seconds, no extremity pallor  PULM: normal work of breathing, no stridor, equal chest rise, no peripheral cyanosis  ABD: scaphoid, nondistended  Ext: no rash, no deformities, moving all joints with normal ROM  Neuro: MA, gait intact, face grossly symmetric  Psych: well groomed, mood and affect congruent, makes good eye contact, cooperative      EKG       Initial Impression and MDM   57yM with acute onset OD subconjunctival  hemorrhage with chemosis, concerning for acute ocular trauma vs conjunctivitis vs infection. He is not having significant pain, VA and IOP normal, as well as normal pupil exam - doubt endopthalmitis or iritis as etiology. Perhaps his exam is worsened by being on eliquis. Plan for CT orbits to evaluate integrity of globe.     Additional Considerations:   Additional testing  was/not considered during the course of this workup.  Comorbidities taken into consideration during the patient's evaluation and treatment include:     Social determinants of health taken into consideration during development of our treatment plan include:    Medications Given     Medications   proparacaine 0.5 % ophthalmic solution 1 drop (1 drop Both Eyes Given 2/10/23 1732)   fluorescein ophthalmic strip 1 each (1 each Left Eye Given 2/10/23 1732)       Results and Course     Labs Reviewed   POCT GLUCOSE - Abnormal; Notable for the following components:       Result Value    POCT Glucose 293 (*)     All other components within normal limits   POCT GLUCOSE MONITORING CONTINUOUS       Imaging Results              CT ORBITS WITHOUT CONTRAST (Final result)  Result time 02/10/23 19:29:31      Final result by Nazario Fernandes MD (02/10/23 19:29:31)                   Impression:      Minimal edema of the preseptal soft tissues adjacent to the right eye with no organized fluid collection.  The retro-orbital fat appears normal without edema or infiltration.  The right globe, optic nerves, and ocular muscles are unremarkable.    Electronically signed by resident: Angela Guerra  Date:    02/10/2023  Time:    19:05    Electronically signed by: Nazario Fernandes MD  Date:    02/10/2023  Time:    19:29               Narrative:    EXAMINATION:  CT ORBITS WITHOUT CONTRAST    CLINICAL HISTORY:  Orbital asymmetry;r eye trauma with proptosis;    TECHNIQUE:  Axial images were acquired through the orbits without contrast administration.  Coronal and sagittal  reconstructions were performed.    COMPARISON:  None    FINDINGS:  Right: Normal appearance of the globe, optic nerve, and ocular muscles.  There is minimal edema of the preseptal soft tissues adjacent to the right eye.  No organized fluid collection.  The retro-orbital fat appears normal without edema or infiltration.    Left: Appearance of the globes, optic nerves, and ocular muscles.  There is no significant stranding of the preseptal soft tissues.  No organized fluid collection.  The retro-orbital fat appears normal without edema or infiltration.    Intracranial Compartment (limited evaluation): Partially imaged age-related generalized cerebral volume loss with supratentorial white matter chronic microvascular ischemic change.    Skull/Extracranial Contents (limited evaluation): The paranasal sinuses and mastoid air cells are essentially clear.                                               Additional MDM   57 y.o. male with hemorrhagic conjunctivitis.   Intra-ocular pressures are reassuring, he does not have any obvious laceration, ulceration or corneal abrasion on fluorescein exam.  There is no hyphema.  CT scan by my independent interpretation shows an intact globe without retro-orbital hematoma.  Given this I do not think he requires emergent ophthalmology evaluation.  Plan for erythromycin ointment empirically, and rapid outpatient Ophthalmology follow-up.         Final diagnoses:  [H11.31] Subconjunctival hemorrhage of right eye (Primary)  [H11.421] Chemosis of right conjunctiva        ED Disposition Condition    Discharge Stable          ED Prescriptions       Medication Sig Dispense Start Date End Date Auth. Provider    erythromycin (ROMYCIN) ophthalmic ointment Place a 1/2 inch ribbon of ointment into the lower eyelid three times a day. 3.5 g 2/10/2023 -- Angela Mejia MD          Follow-up Information       Follow up With Specialties Details Why Contact Info Additional Information    [facility] -  Emergency Dept Emergency Medicine  If symptoms worsen [address] [facility] [id number] [phone]     [facility] - [address] [place] [address] Ophthalmology Schedule an appointment as soon as possible for a visit in 2 days  [address] [facility] [id number] [phone] Please arrive on the 10th floor for check-in.               Angela Mejia MD  02/10/23 7926

## 2023-02-11 NOTE — DISCHARGE INSTRUCTIONS
Make an appointment in 1-2 days with an ophthalmologist. It is very important that you make this appointment. Use the prescribed ointment.     Return to the ED with any vision changes, fevers, worsening swelling, or any other complaints.

## 2023-02-22 PROBLEM — E66.9 CLASS 1 OBESITY IN ADULT: Status: ACTIVE | Noted: 2023-01-01

## 2023-02-22 PROBLEM — E87.70 HYPERVOLEMIA: Status: ACTIVE | Noted: 2023-01-01

## 2023-02-22 NOTE — CONSULTS
[facility] - Emergency Dept  Nephrology  Consult Note    Patient Name: Cosme Lewis  MRN: [id number]  Admission Date: 2/21/2023  Hospital Length of Stay: 0 days  Attending Provider: Deep Edmondson MD   Primary Care Physician: Primary Doctor No  Principal Problem:ESRD on dialysis    Inpatient consult to Nephrology  Consult performed by: Citlalli Simpson DNP  Consult ordered by: Silvia Herrera MD  Reason for consult: ESRD on HD        Subjective:     HPI: 57-year-old male with ESRD on HD, type 1 diabetes, COPD on 3 L nasal cannula at baseline is presenting to the emergency department for shortness of breath. Unable to provide any history secondary to dementia.  Per EMS report patient was hypoxic at nursing home earlier today, oxygen was increased to 5 L by EMS.  Per chart review EMS also states the patient has not been getting his breathing treatments 4 times a day which she is prescribed while at his nursing home.  Patient is unable to say when his last dialysis was. Electrolytes stable. Nephrology consulted for ESRD on HD.    Chest x-ray with bilateral infiltrates and pleural effusion. BNP >4900.       Past Medical History:   Diagnosis Date    Chronic kidney disease-mineral and bone disorder 10/12/2022    COPD (chronic obstructive pulmonary disease)     Diabetes mellitus type 1     ESRD on dialysis     Hypertension     SOB (shortness of breath) 10/12/2022    Patient with history COPD treated with Ellipta the albuterol, fluticasone-salmeterol tachypneic in the ED saturating 94% and 6 L on nasal cannula, patient does not know how many  he uses it is in nursing home.    -duo nebs Q 4  Given that patient is a poor historian, and in the setting of left lower extremity edema and history of coagulopathy PE cannot be ruled out.  Will workup for possible infec       Past Surgical History:   Procedure Laterality Date    AV FISTULA PLACEMENT         Review of patient's allergies indicates:  No Known Allergies  Current  Facility-Administered Medications   Medication Frequency    0.9%  NaCl infusion Once    acetaminophen tablet 650 mg Q4H PRN    albuterol-ipratropium 2.5 mg-0.5 mg/3 mL nebulizer solution 3 mL Q4H PRN    apixaban tablet 5 mg BID    aspirin EC tablet 81 mg Daily    atorvastatin tablet 40 mg Daily    bisacodyL suppository 10 mg Daily PRN    cetirizine tablet 10 mg Daily PRN    [START ON 2/25/2023] cloNIDine 0.3 mg/24 hr td ptwk 1 patch Every Sat    dextrose 10% bolus 125 mL 125 mL PRN    dextrose 10% bolus 250 mL 250 mL PRN    dextrose 40 % gel 15,000 mg PRN    dextrose 40 % gel 30,000 mg PRN    FLUoxetine capsule 40 mg Daily    fluticasone-salmeterol 250-50 mcg/dose diskus inhaler 1 puff BID    glucagon (human recombinant) injection 1 mg PRN    insulin aspart U-100 pen 0-5 Units QID (AC + HS) PRN    insulin detemir U-100 pen 5 Units Daily    isosorbide mononitrate 24 hr tablet 30 mg BID    melatonin tablet 6 mg Nightly PRN    naloxone 0.4 mg/mL injection 0.02 mg PRN    NIFEdipine 24 hr tablet 90 mg Daily    ondansetron disintegrating tablet 8 mg Q8H PRN    polyethylene glycol packet 17 g Daily PRN    promethazine tablet 25 mg Q6H PRN    sodium chloride 0.9% flush 10 mL Q12H PRN    tiotropium bromide 2.5 mcg/actuation inhaler 2 puff Daily    vitamin renal formula (B-complex-vitamin c-folic acid) 1 mg per capsule 1 capsule Daily     Current Outpatient Medications   Medication    acetaminophen (TYLENOL) 650 MG TbSR    albuterol (PROVENTIL/VENTOLIN HFA) 90 mcg/actuation inhaler    apixaban (ELIQUIS) 5 mg Tab    aspirin (ECOTRIN) 81 MG EC tablet    atorvastatin (LIPITOR) 40 MG tablet    carvediloL (COREG) 3.125 MG tablet    cetirizine (ZYRTEC) 10 MG tablet    cloNIDine 0.3 mg/24 hr td ptwk (CATAPRES) 0.3 mg/24 hr    erythromycin (ROMYCIN) ophthalmic ointment    FLUoxetine 40 MG capsule    fluticasone-salmeterol diskus inhaler 250-50 mcg    GLUCAGON EMERGENCY KIT, HUMAN, 1 mg  injection    hydrALAZINE (APRESOLINE) 100 MG tablet    HYDROPHILIC CREAM TOP    insulin aspart U-100 (NOVOLOG) 100 unit/mL (3 mL) InPn pen    insulin detemir U-100 (LEVEMIR FLEXTOUCH) 100 unit/mL (3 mL) SubQ InPn pen    isosorbide mononitrate (IMDUR) 30 MG 24 hr tablet    melatonin 3 mg TbDL    NIFEdipine (PROCARDIA-XL) 90 MG (OSM) 24 hr tablet    nut.tx.imp.renal fxn,lac-reduc (NEPRO CARB STEADY ORAL)    sodium chloride (OCEAN) 0.65 % nasal spray    tiotropium bromide (SPIRIVA RESPIMAT) 2.5 mcg/actuation inhaler    triamcinolone acetonide 0.025 % Lotn    vitamin renal formula, B-complex-vitamin c-folic acid, (NEPHROCAP) 1 mg Cap     Family History       Problem Relation (Age of Onset)    Diabetes Mother          Tobacco Use    Smoking status: Never    Smokeless tobacco: Never   Substance and Sexual Activity    Alcohol use: Not Currently    Drug use: Not on file    Sexual activity: Not Currently     Review of Systems   Constitutional: Negative.    HENT: Negative.     Eyes: Negative.    Respiratory:  Negative for cough and shortness of breath.    Cardiovascular:  Negative for chest pain and leg swelling.   Gastrointestinal: Negative.    Genitourinary:  Positive for decreased urine volume.   Musculoskeletal: Negative.    Skin: Negative.    Allergic/Immunologic: Negative.    Neurological: Negative.    Hematological: Negative.    Psychiatric/Behavioral:  Positive for confusion.    Objective:     Vital Signs (Most Recent):  Temp: 97.3 °F (36.3 °C) (02/22/23 0700)  Pulse: 64 (02/22/23 1000)  Resp: 15 (02/22/23 1000)  BP: (!) 191/106 (02/22/23 1000)  SpO2: 97 % (02/22/23 1000)   Vital Signs (24h Range):  Temp:  [97.3 °F (36.3 °C)-97.7 °F (36.5 °C)] 97.3 °F (36.3 °C)  Pulse:  [63-84] 64  Resp:  [13-24] 15  SpO2:  [91 %-100 %] 97 %  BP: (100-213)/() 191/106     Weight: 93 kg (205 lb) (02/22/23 0925)  Body mass index is 30.27 kg/m².  Body surface area is 2.13 meters squared.    No intake/output data  recorded.    Physical Exam  Vitals and nursing note reviewed.   Constitutional:       Appearance: He is ill-appearing.   HENT:      Mouth/Throat:      Pharynx: Oropharynx is clear.   Eyes:      Conjunctiva/sclera: Conjunctivae normal.   Cardiovascular:      Rate and Rhythm: Normal rate.   Pulmonary:      Effort: Pulmonary effort is normal.      Breath sounds: Wheezing present.   Abdominal:      General: There is no distension.      Palpations: Abdomen is soft.   Musculoskeletal:      Right lower leg: No edema.      Left lower leg: No edema.   Skin:     General: Skin is dry.   Neurological:      Mental Status: He is disoriented.       Significant Labs:  CBC:   Recent Labs   Lab 02/21/23 2238   WBC 4.19   RBC 3.87*   HGB 11.4*   HCT 37.2*      MCV 96   MCH 29.5   MCHC 30.6*     CMP:   Recent Labs   Lab 02/21/23 2238   *   CALCIUM 8.5*   ALBUMIN 2.9*   PROT 6.0   *   K 4.0   CO2 25   CL 97   BUN 35*   CREATININE 5.2*   ALKPHOS 327*   ALT 79*   AST 96*   BILITOT 0.7     All labs within the past 24 hours have been reviewed.        Assessment/Plan:     Pulmonary  Acute on chronic respiratory failure with hypoxia  -management per primary     Renal/  * ESRD on dialysis  -Outpatient HD unit: [facility]  -HD tx days: MWF   -Last HD tx prior to presentation: 2/19/23  -HD access: LUE AVF  -HD modality: iHD   -Residual urine: Anuric   EDW 70 kg     Plan/Reccomendations     HD today for metabolic clearance and volume management 2/22  Strict I&Os   Pre and post HD weight       Hypervolemia  -UF with HD today     Chronic kidney disease-mineral and bone disorder  -continue sevelamer 800 TID with meals.     Oncology  Anemia in ESRD (end-stage renal disease)  - hgb goal 10-11  At goal 11.4, iron studies in am         Thank you for your consult. I will follow-up with patient. Please contact us if you have any additional questions.    Citlalli Simpson, KUMAR  Nephrology  [facility] - Emergency Dept

## 2023-02-22 NOTE — ASSESSMENT & PLAN NOTE
-Outpatient HD unit: [facility]  -HD tx days: MWF   -Last HD tx prior to presentation: 2/19/23  -HD access: LUE AVF  -HD modality: iHD   -Residual urine: Anuric   EDW 70 kg     Plan/Reccomendations     HD today for metabolic clearance and volume management 2/22  Strict I&Os   Pre and post HD weight

## 2023-02-22 NOTE — PHARMACY MED REC
"  Admission Medication History     The home medication history was taken by Westley De La O.    You may go to "Admission" then "Reconcile Home Medications" tabs to review and/or act upon these items.     The home medication list has been updated by the Pharmacy department.   Please read ALL comments highlighted in yellow.   Please address this information as you see fit.    Feel free to contact us if you have any questions or require assistance.      Medications listed below were obtained from: Nursing home  Current Outpatient Medications on File Prior to Encounter   Medication Sig    acetaminophen (TYLENOL) 650 MG TbSR Take 650 mg by mouth every 8 (eight) hours as needed (pain or fever over 100.4).      albuterol (PROVENTIL/VENTOLIN HFA) 90 mcg/actuation inhaler   Inhale 2 puffs into the lungs every 6 (six) hours as needed for Wheezing or Shortness of Breath.    apixaban (ELIQUIS) 5 mg Tab Take 5 mg by mouth 2 (two) times daily.      aspirin (ECOTRIN) 81 MG EC tablet   Take 81 mg by mouth once daily.    atorvastatin (LIPITOR) 40 MG tablet   Take 1 tablet (40 mg total) by mouth once daily.    carvediloL (COREG) 3.125 MG tablet   Take 2 tablets (6.25 mg total) by mouth 2 (two) times daily.    cetirizine (ZYRTEC) 10 MG tablet   Take 10 mg by mouth daily as needed (itching).    cloNIDine 0.3 mg/24 hr td ptwk (CATAPRES) 0.3 mg/24 hr   Place 1 patch onto the skin every Saturday.    erythromycin (ROMYCIN) ophthalmic ointment Place a 1/2 inch ribbon of ointment into the lower eyelid three times a day.      FLUoxetine 40 MG capsule   Take 40 mg by mouth once daily.    fluticasone-salmeterol diskus inhaler 250-50 mcg   Inhale 1 puff into the lungs 2 (two) times daily.    GLUCAGON EMERGENCY KIT, HUMAN, 1 mg injection   1 mg into the muscle as needed if CBG < 70    hydrALAZINE (APRESOLINE) 100 MG tablet Take 1 tablet (100 mg total) by mouth every 8 (eight) hours.      HYDROPHILIC CREAM TOP Apply liberal amount to affected area twice " daily for dry skin.      insulin aspart U-100 (NOVOLOG) 100 unit/mL (3 mL) InPn pen Inject 5 Units into the skin 3 (three) times daily with meals. (Plus sliding scale).      insulin detemir U-100 (LEVEMIR FLEXTOUCH) 100 unit/mL (3 mL) SubQ InPn pen Inject 20 Units into the skin every morning and 15 units in the evening.        isosorbide mononitrate (IMDUR) 30 MG 24 hr tablet Take 30 mg by mouth 2 (two) times daily.      melatonin 3 mg TbDL Take 9 mg by mouth nightly as needed (sleep).      NIFEdipine (PROCARDIA-XL) 90 MG (OSM) 24 hr tablet   Take 1 tablet (90 mg total) by mouth once daily.    nut.tx.imp.renal fxn,lac-reduc (NEPRO CARB STEADY ORAL)   Give 1 carton by mouth with each meal.    sodium chloride (OCEAN) 0.65 % nasal spray   2 sprays by Nasal route every 4 (four) hours as needed for Congestion.    tiotropium bromide (SPIRIVA RESPIMAT) 2.5 mcg/actuation inhaler   Inhale 2 puffs into the lungs Daily.    triamcinolone acetonide 0.025 % Lotn   Apply topically. Apply to the affected area twice daily    vitamin renal formula, B-complex-vitamin c-folic acid, (NEPHROCAP) 1 mg Cap Take 1 capsule by mouth once daily.       Potential issues to be addressed PRIOR TO DISCHARGE  The listed medications were obtained from another facility ([facility]). The patient may not have been able to fill these prescriptions prior to this admission and may require new scripts upon discharge.     Westley De La O  EXT [id number]                .

## 2023-02-22 NOTE — ED NOTES
Received report and assumed care of pt.     LOC/ APPEARANCE: The patient is alert and oriented to self and place. Pt is speaking with slight slurred speech. Pt is in hospital gown. Continuous cardiac monitor, cont pulse ox, and auto BP cuff applied to patient. Pt is clean and well groomed. No JVD visible. Pt reports pain level of 0. Pt updated on POC. Bed low and locked with side rails up x2, call bell in pt reach.  SKIN: Skin is warm dry and intact, and color is consistent with ethnicity. No breakdown or brusing visible. Mucus membranes moist, acyanotic.  RESPIRATORY: Airway is open and patent. Respirations-spontaneous, labored upon movement. NC running on 3L O2.   CARDIAC: Patient has regular heart rate.  No peripheral edema noted, and patient has no c/o chest pain. BP elevated.   ABDOMEN: Soft and non-tender. Pt has no complaints of abnormal bowel movements, denies blood in stool. Pt reports normal appetite.   NEUROLOGIC: Eyes open spontaneously and facial expression symmetrical. Pt behavior appropriate to situation, and pt follows commands.   MUSCULOSKELETAL: Pt moves with stiffness and minor difficulty.   : No complaints of frequency, burning, urgency or blood in the urine. No complaints of incontinence.

## 2023-02-22 NOTE — HPI
57-year-old male with ESRD on HD, type 1 diabetes, COPD on 3 L nasal cannula at baseline is presenting to the emergency department for shortness of breath. Unable to provide any history secondary to dementia.  Per EMS report patient was hypoxic at nursing home earlier today, oxygen was increased to 5 L by EMS.  Per chart review EMS also states the patient has not been getting his breathing treatments 4 times a day which she is prescribed while at his nursing home.  Patient is unable to say when his last dialysis was. Electrolytes stable. Nephrology consulted for ESRD on HD.    Chest x-ray with bilateral infiltrates and pleural effusion. BNP >4900.

## 2023-02-22 NOTE — SUBJECTIVE & OBJECTIVE
Past Medical History:   Diagnosis Date    Chronic kidney disease-mineral and bone disorder 10/12/2022    COPD (chronic obstructive pulmonary disease)     Diabetes mellitus type 1     ESRD on dialysis     Hypertension     SOB (shortness of breath) 10/12/2022    Patient with history COPD treated with Ellipta the albuterol, fluticasone-salmeterol tachypneic in the ED saturating 94% and 6 L on nasal cannula, patient does not know how many  he uses it is in nursing home.    -duo nebs Q 4  Given that patient is a poor historian, and in the setting of left lower extremity edema and history of coagulopathy PE cannot be ruled out.  Will workup for possible infec       Past Surgical History:   Procedure Laterality Date    AV FISTULA PLACEMENT         Review of patient's allergies indicates:  No Known Allergies    No current facility-administered medications on file prior to encounter.     Current Outpatient Medications on File Prior to Encounter   Medication Sig    acetaminophen (TYLENOL) 650 MG TbSR Take 650 mg by mouth every 8 (eight) hours as needed (pain or fever over 100.4).    albuterol (PROVENTIL/VENTOLIN HFA) 90 mcg/actuation inhaler Inhale 2 puffs into the lungs every 6 (six) hours as needed for Wheezing or Shortness of Breath.    apixaban (ELIQUIS) 5 mg Tab Take 5 mg by mouth 2 (two) times daily.    aspirin (ECOTRIN) 81 MG EC tablet Take 81 mg by mouth once daily.    atorvastatin (LIPITOR) 40 MG tablet Take 1 tablet (40 mg total) by mouth once daily.    carvediloL (COREG) 3.125 MG tablet Take 2 tablets (6.25 mg total) by mouth 2 (two) times daily.    cetirizine (ZYRTEC) 10 MG tablet Take 10 mg by mouth daily as needed (itching).    cloNIDine 0.3 mg/24 hr td ptwk (CATAPRES) 0.3 mg/24 hr Place 1 patch onto the skin every Saturday.    erythromycin (ROMYCIN) ophthalmic ointment Place a 1/2 inch ribbon of ointment into the lower eyelid three times a day.    FLUoxetine 40 MG capsule Take 40 mg by mouth once daily.     fluticasone-salmeterol diskus inhaler 250-50 mcg Inhale 1 puff into the lungs 2 (two) times daily.    GLUCAGON EMERGENCY KIT, HUMAN, 1 mg injection 1 mg into the muscle as needed if CBG < 70    hydrALAZINE (APRESOLINE) 100 MG tablet Take 1 tablet (100 mg total) by mouth every 8 (eight) hours.    HYDROPHILIC CREAM TOP Apply liberal amount to affected area twice daily for dry skin    insulin aspart U-100 (NOVOLOG) 100 unit/mL (3 mL) InPn pen Inject 5 Units into the skin 3 (three) times daily with meals. (Patient taking differently: Inject 5 Units into the skin 3 (three) times daily with meals. (Plus sliding scale))    insulin detemir U-100 (LEVEMIR FLEXTOUCH) 100 unit/mL (3 mL) SubQ InPn pen Inject 10 Units into the skin once daily. (Patient taking differently: Inject 20 Units into the skin every morning and 15 units in the evening.)    isosorbide mononitrate (IMDUR) 30 MG 24 hr tablet Take 30 mg by mouth 2 (two) times daily.    melatonin 3 mg TbDL Take 9 mg by mouth nightly as needed (sleep).    NIFEdipine (PROCARDIA-XL) 90 MG (OSM) 24 hr tablet Take 1 tablet (90 mg total) by mouth once daily.    nut.tx.imp.renal fxn,lac-reduc (NEPRO CARB STEADY ORAL) Give 1 carton by mouth with each meal.    sodium chloride (OCEAN) 0.65 % nasal spray 2 sprays by Nasal route every 4 (four) hours as needed for Congestion.    tiotropium bromide (SPIRIVA RESPIMAT) 2.5 mcg/actuation inhaler Inhale 2 puffs into the lungs Daily.    triamcinolone acetonide 0.025 % Lotn Apply topically. Apply to the affected area twice daily    vitamin renal formula, B-complex-vitamin c-folic acid, (NEPHROCAP) 1 mg Cap Take 1 capsule by mouth once daily.     Family History       Problem Relation (Age of Onset)    Diabetes Mother          Tobacco Use    Smoking status: Never    Smokeless tobacco: Never   Substance and Sexual Activity    Alcohol use: Not Currently    Drug use: Not on file    Sexual activity: Not Currently     Review of Systems   Unable to  perform ROS: Dementia   Objective:     Vital Signs (Most Recent):  Temp: 97.3 °F (36.3 °C) (02/22/23 0700)  Pulse: 64 (02/22/23 1000)  Resp: 15 (02/22/23 1000)  BP: (!) 191/106 (02/22/23 1000)  SpO2: 97 % (02/22/23 1000)   Vital Signs (24h Range):  Temp:  [97.3 °F (36.3 °C)-97.7 °F (36.5 °C)] 97.3 °F (36.3 °C)  Pulse:  [63-84] 64  Resp:  [13-24] 15  SpO2:  [91 %-100 %] 97 %  BP: (100-213)/() 191/106     Weight: 93 kg (205 lb)  Body mass index is 30.27 kg/m².    Physical Exam  Vitals and nursing note reviewed.   Constitutional:       General: He is not in acute distress.     Appearance: He is well-developed.   HENT:      Head: Normocephalic and atraumatic.      Mouth/Throat:      Pharynx: No oropharyngeal exudate.   Eyes:      Conjunctiva/sclera: Conjunctivae normal.      Pupils: Pupils are equal, round, and reactive to light.   Cardiovascular:      Rate and Rhythm: Normal rate and regular rhythm.      Heart sounds: Normal heart sounds.   Pulmonary:      Effort: Pulmonary effort is normal. No respiratory distress.      Breath sounds: Wheezing present.   Abdominal:      General: Bowel sounds are normal. There is distension.      Palpations: Abdomen is soft.      Tenderness: There is no abdominal tenderness.   Musculoskeletal:         General: No tenderness. Normal range of motion.      Cervical back: Normal range of motion and neck supple.      Right lower leg: No edema.      Left lower leg: No edema.   Lymphadenopathy:      Cervical: No cervical adenopathy.   Skin:     General: Skin is warm and dry.      Capillary Refill: Capillary refill takes less than 2 seconds.      Findings: No rash.   Neurological:      Mental Status: He is alert. He is disoriented.      Cranial Nerves: No cranial nerve deficit.      Sensory: No sensory deficit.      Coordination: Coordination normal.   Psychiatric:         Behavior: Behavior normal.         Thought Content: Thought content normal.         Judgment: Judgment normal.          CRANIAL NERVES     CN III, IV, VI   Pupils are equal, round, and reactive to light.     Significant Labs: All pertinent labs within the past 24 hours have been reviewed.  CBC:   Recent Labs   Lab 02/21/23 2238   WBC 4.19   HGB 11.4*   HCT 37.2*        CMP:   Recent Labs   Lab 02/21/23 2238   *   K 4.0   CL 97   CO2 25   *   BUN 35*   CREATININE 5.2*   CALCIUM 8.5*   PROT 6.0   ALBUMIN 2.9*   BILITOT 0.7   ALKPHOS 327*   AST 96*   ALT 79*   ANIONGAP 12       Significant Imaging: I have reviewed all pertinent imaging results/findings within the past 24 hours.    Imaging Results              CT Abdomen Pelvis  Without Contrast (Final result)  Result time 02/22/23 04:27:33      Final result by Gavino Cifuentes MD (02/22/23 04:27:33)                   Impression:      1. Allowing for study limitations secondary to lack of intravenous contrast, no abscess identified.  2. Stable-appearing small bilateral pleural effusions.  3. Stable-appearing bibasilar interlobular septal thickening and consolidative predominantly linear opacities, findings are nonspecific and likely represent inflammation, aspiration or infection.  4. Cardiomegaly.  5. Hepatomegaly, possible hepatic venous congestion.  6. Large volume abdominopelvic ascites.  7. Anasarca.  8. Additional findings as above.    Electronically signed by resident: Sameer Stern  Date:    02/22/2023  Time:    03:50    Electronically signed by: Gavino Cifuentes MD  Date:    02/22/2023  Time:    04:27               Narrative:    EXAMINATION:  CT ABDOMEN PELVIS WITHOUT CONTRAST    CLINICAL HISTORY:  Abdominal abscess/infection suspected;Elevated LFTs;    TECHNIQUE:  Low dose axial images, sagittal and coronal reformations were obtained from the lung bases to the pubic symphysis without intravenous contrast.   Oral contrast was not administered.    COMPARISON:  CT abdomen pelvis 10/12/2022    FINDINGS:  Evaluation is limited by lack of  intravenous contrast and attenuation characteristics of image acquisition.    LUNG BASES & MEDIASTINUM (limited):  Cardiomegaly.  No pericardial effusion.  Multivessel coronary arterial calcific plaque.  Stable small bilateral pleural effusions.  Stable-appearing bibasilar interlobular septal thickening and consolidative predominantly linear opacities.    HEPATOBILIARY: Hepatomegaly.  Intrahepatic IVC and hepatic veins appear prominent, similar to prior.  No focal hepatic lesions.No biliary ductal dilatation.Normal gallbladder.    SPLEEN:  No splenomegaly.    PANCREAS:  No focal masses or ductal dilatation.    ADRENALS:  No adrenal nodules.    KIDNEYS/URETERS:  No hydronephrosis, stones or solid mass lesions. Ureters are unable to be followed throughout their length to the bladder however visualized portions are unremarkable.    PELVIC ORGANS/BLADDER:  Bladder is decompressed limiting evaluation.    PERITONEUM / RETROPERITONEUM:  No free air. Large volume abdominopelvic ascites.    LYMPH NODES:  Allowing for limitations secondary to lack of intravenous contrast, there is no overt lymphadenopathy.    VESSELS: Aorta tapers normally.  Moderate aortoiliac calcific plaque.    GI TRACT: Distal esophagus and stomach are unremarkable.  No distended bowel loops to suggest bowel obstruction.  Bowel assessment somewhat limited due to lack of contrast.  Appendix appears similar to prior with residual contrast present in the lumen.    BONES AND SOFT TISSUES: Anasarca.   No acute fractures.  Osseous structures appear similar to prior.                                       X-Ray Chest AP Portable (Final result)  Result time 02/22/23 00:18:35      Final result by Gavino Cifuentes MD (02/22/23 00:18:35)                   Impression:      Cardiomegaly with diffuse bilateral airspace infiltrates, similar to prior.  Small right pleural effusion has increased.      Electronically signed by: Gvaino Cifuentes  MD  Date:    02/22/2023  Time:    00:18               Narrative:    EXAMINATION:  XR CHEST AP PORTABLE    CLINICAL HISTORY:  sob;    TECHNIQUE:  Single frontal view of the chest was performed.    COMPARISON:  01/04/2023.    FINDINGS:  Cardiomegaly with diffuse bilateral airspace infiltrates, similar to prior.  Small right pleural effusion has increased.    Heart and lungs otherwise appear unchanged when allowing for differences in technique and positioning.

## 2023-02-22 NOTE — H&P
[facility] - Emergency Dept  [facility]  History & Physical    Patient Name: Cosme Lewis  MRN: [id number]  Patient Class: OP- Observation  Admission Date: 2/21/2023  Attending Physician: Deep Edmondson MD   Primary Care Provider: Primary Doctor No         Patient information was obtained from patient and ER records.     Subjective:     Principal Problem:ESRD on dialysis    Chief Complaint:   Chief Complaint   Patient presents with    Shortness of Breath     On 3L baseline, on 5L with EMS arrival. Denies chest pain. On 3L now, 96. From [facility], they didn't know he was supposed to be getting breathing txs 4 times a day.         HPI: Cosme Lewis is a 57 y.o. male with PMHx of ESRD on HD, T1DM, COPD on 3L NC baseline, and dementia who is brought by EMS from [facility] due to hypoxia. Patient is demented at baseline and unable to provide adequate history on interview. History provided by EMS, chart review, and ER. Patient reportedly has not been getting breathing treatments at NH, which he is supposed to be getting 4x daily at NH. Per EMS, oxygen increased to 5L during transport and on my exam he has been weaned down to baseline 3L. Patient unable to provide information on HD treatments and what days he has HD. Unable to perform ROS secondary to dementia.     In the ED: Hypertensive, other VSSAF, on 3L NC baseline, normal oxygen saturations. CBC without leukocytosis. CMP with Na 134, otherwise consistent with ESRD. Trop 0.034 >> 0.036 (BL ~ 0.033). BNP > 4,900. EKG showing sinus rhythm with sinus arrhythmia with 1st degree A-V block, nonspecific T wave abnormality, prolonged QT, abnormal ECG when compared to prior. CXR with cardiomegaly with diffuse bilateral airspace infiltrates, similar to prior and small right pleural effusion has increased. CTAP without contrast showing stable bilateral pleural effusions, cardiomegaly, hepatomegaly with possible hepatic venous congestion, large volume  abdominopelvic ascites, and anasarca. Received breathing treatments, coreg, lasix 80mg injection, and hydralazine 100mg oral. Nephrology consulted for HD.      Past Medical History:   Diagnosis Date    Chronic kidney disease-mineral and bone disorder 10/12/2022    COPD (chronic obstructive pulmonary disease)     Diabetes mellitus type 1     ESRD on dialysis     Hypertension     SOB (shortness of breath) 10/12/2022    Patient with history COPD treated with Ellipta the albuterol, fluticasone-salmeterol tachypneic in the ED saturating 94% and 6 L on nasal cannula, patient does not know how many  he uses it is in nursing home.    -duo nebs Q 4  Given that patient is a poor historian, and in the setting of left lower extremity edema and history of coagulopathy PE cannot be ruled out.  Will workup for possible infec       Past Surgical History:   Procedure Laterality Date    AV FISTULA PLACEMENT         Review of patient's allergies indicates:  No Known Allergies    No current facility-administered medications on file prior to encounter.     Current Outpatient Medications on File Prior to Encounter   Medication Sig    acetaminophen (TYLENOL) 650 MG TbSR Take 650 mg by mouth every 8 (eight) hours as needed (pain or fever over 100.4).    albuterol (PROVENTIL/VENTOLIN HFA) 90 mcg/actuation inhaler Inhale 2 puffs into the lungs every 6 (six) hours as needed for Wheezing or Shortness of Breath.    apixaban (ELIQUIS) 5 mg Tab Take 5 mg by mouth 2 (two) times daily.    aspirin (ECOTRIN) 81 MG EC tablet Take 81 mg by mouth once daily.    atorvastatin (LIPITOR) 40 MG tablet Take 1 tablet (40 mg total) by mouth once daily.    carvediloL (COREG) 3.125 MG tablet Take 2 tablets (6.25 mg total) by mouth 2 (two) times daily.    cetirizine (ZYRTEC) 10 MG tablet Take 10 mg by mouth daily as needed (itching).    cloNIDine 0.3 mg/24 hr td ptwk (CATAPRES) 0.3 mg/24 hr Place 1 patch onto the skin every Saturday.     erythromycin (ROMYCIN) ophthalmic ointment Place a 1/2 inch ribbon of ointment into the lower eyelid three times a day.    FLUoxetine 40 MG capsule Take 40 mg by mouth once daily.    fluticasone-salmeterol diskus inhaler 250-50 mcg Inhale 1 puff into the lungs 2 (two) times daily.    GLUCAGON EMERGENCY KIT, HUMAN, 1 mg injection 1 mg into the muscle as needed if CBG < 70    hydrALAZINE (APRESOLINE) 100 MG tablet Take 1 tablet (100 mg total) by mouth every 8 (eight) hours.    HYDROPHILIC CREAM TOP Apply liberal amount to affected area twice daily for dry skin    insulin aspart U-100 (NOVOLOG) 100 unit/mL (3 mL) InPn pen Inject 5 Units into the skin 3 (three) times daily with meals. (Patient taking differently: Inject 5 Units into the skin 3 (three) times daily with meals. (Plus sliding scale))    insulin detemir U-100 (LEVEMIR FLEXTOUCH) 100 unit/mL (3 mL) SubQ InPn pen Inject 10 Units into the skin once daily. (Patient taking differently: Inject 20 Units into the skin every morning and 15 units in the evening.)    isosorbide mononitrate (IMDUR) 30 MG 24 hr tablet Take 30 mg by mouth 2 (two) times daily.    melatonin 3 mg TbDL Take 9 mg by mouth nightly as needed (sleep).    NIFEdipine (PROCARDIA-XL) 90 MG (OSM) 24 hr tablet Take 1 tablet (90 mg total) by mouth once daily.    nut.tx.imp.renal fxn,lac-reduc (NEPRO CARB STEADY ORAL) Give 1 carton by mouth with each meal.    sodium chloride (OCEAN) 0.65 % nasal spray 2 sprays by Nasal route every 4 (four) hours as needed for Congestion.    tiotropium bromide (SPIRIVA RESPIMAT) 2.5 mcg/actuation inhaler Inhale 2 puffs into the lungs Daily.    triamcinolone acetonide 0.025 % Lotn Apply topically. Apply to the affected area twice daily    vitamin renal formula, B-complex-vitamin c-folic acid, (NEPHROCAP) 1 mg Cap Take 1 capsule by mouth once daily.     Family History       Problem Relation (Age of Onset)    Diabetes Mother          Tobacco Use    Smoking  status: Never    Smokeless tobacco: Never   Substance and Sexual Activity    Alcohol use: Not Currently    Drug use: Not on file    Sexual activity: Not Currently     Review of Systems   Unable to perform ROS: Dementia   Objective:     Vital Signs (Most Recent):  Temp: 97.3 °F (36.3 °C) (02/22/23 0700)  Pulse: 64 (02/22/23 1000)  Resp: 15 (02/22/23 1000)  BP: (!) 191/106 (02/22/23 1000)  SpO2: 97 % (02/22/23 1000)   Vital Signs (24h Range):  Temp:  [97.3 °F (36.3 °C)-97.7 °F (36.5 °C)] 97.3 °F (36.3 °C)  Pulse:  [63-84] 64  Resp:  [13-24] 15  SpO2:  [91 %-100 %] 97 %  BP: (100-213)/() 191/106     Weight: 93 kg (205 lb)  Body mass index is 30.27 kg/m².    Physical Exam  Vitals and nursing note reviewed.   Constitutional:       General: He is not in acute distress.     Appearance: He is well-developed.   HENT:      Head: Normocephalic and atraumatic.      Mouth/Throat:      Pharynx: No oropharyngeal exudate.   Eyes:      Conjunctiva/sclera: Conjunctivae normal.      Pupils: Pupils are equal, round, and reactive to light.   Cardiovascular:      Rate and Rhythm: Normal rate and regular rhythm.      Heart sounds: Normal heart sounds.   Pulmonary:      Effort: Pulmonary effort is normal. No respiratory distress.      Breath sounds: Wheezing present.   Abdominal:      General: Bowel sounds are normal. There is distension.      Palpations: Abdomen is soft.      Tenderness: There is no abdominal tenderness.   Musculoskeletal:         General: No tenderness. Normal range of motion.      Cervical back: Normal range of motion and neck supple.      Right lower leg: No edema.      Left lower leg: No edema.   Lymphadenopathy:      Cervical: No cervical adenopathy.   Skin:     General: Skin is warm and dry.      Capillary Refill: Capillary refill takes less than 2 seconds.      Findings: No rash.   Neurological:      Mental Status: He is alert. He is disoriented.      Cranial Nerves: No cranial nerve deficit.       Sensory: No sensory deficit.      Coordination: Coordination normal.   Psychiatric:         Behavior: Behavior normal.         Thought Content: Thought content normal.         Judgment: Judgment normal.         CRANIAL NERVES     CN III, IV, VI   Pupils are equal, round, and reactive to light.     Significant Labs: All pertinent labs within the past 24 hours have been reviewed.  CBC:   Recent Labs   Lab 02/21/23 2238   WBC 4.19   HGB 11.4*   HCT 37.2*        CMP:   Recent Labs   Lab 02/21/23 2238   *   K 4.0   CL 97   CO2 25   *   BUN 35*   CREATININE 5.2*   CALCIUM 8.5*   PROT 6.0   ALBUMIN 2.9*   BILITOT 0.7   ALKPHOS 327*   AST 96*   ALT 79*   ANIONGAP 12       Significant Imaging: I have reviewed all pertinent imaging results/findings within the past 24 hours.    Imaging Results              CT Abdomen Pelvis  Without Contrast (Final result)  Result time 02/22/23 04:27:33      Final result by Gavino Cifuentes MD (02/22/23 04:27:33)                   Impression:      1. Allowing for study limitations secondary to lack of intravenous contrast, no abscess identified.  2. Stable-appearing small bilateral pleural effusions.  3. Stable-appearing bibasilar interlobular septal thickening and consolidative predominantly linear opacities, findings are nonspecific and likely represent inflammation, aspiration or infection.  4. Cardiomegaly.  5. Hepatomegaly, possible hepatic venous congestion.  6. Large volume abdominopelvic ascites.  7. Anasarca.  8. Additional findings as above.    Electronically signed by resident: Sameer Stern  Date:    02/22/2023  Time:    03:50    Electronically signed by: Gavino Cifuentes MD  Date:    02/22/2023  Time:    04:27               Narrative:    EXAMINATION:  CT ABDOMEN PELVIS WITHOUT CONTRAST    CLINICAL HISTORY:  Abdominal abscess/infection suspected;Elevated LFTs;    TECHNIQUE:  Low dose axial images, sagittal and coronal reformations were obtained from the  lung bases to the pubic symphysis without intravenous contrast.   Oral contrast was not administered.    COMPARISON:  CT abdomen pelvis 10/12/2022    FINDINGS:  Evaluation is limited by lack of intravenous contrast and attenuation characteristics of image acquisition.    LUNG BASES & MEDIASTINUM (limited):  Cardiomegaly.  No pericardial effusion.  Multivessel coronary arterial calcific plaque.  Stable small bilateral pleural effusions.  Stable-appearing bibasilar interlobular septal thickening and consolidative predominantly linear opacities.    HEPATOBILIARY: Hepatomegaly.  Intrahepatic IVC and hepatic veins appear prominent, similar to prior.  No focal hepatic lesions.No biliary ductal dilatation.Normal gallbladder.    SPLEEN:  No splenomegaly.    PANCREAS:  No focal masses or ductal dilatation.    ADRENALS:  No adrenal nodules.    KIDNEYS/URETERS:  No hydronephrosis, stones or solid mass lesions. Ureters are unable to be followed throughout their length to the bladder however visualized portions are unremarkable.    PELVIC ORGANS/BLADDER:  Bladder is decompressed limiting evaluation.    PERITONEUM / RETROPERITONEUM:  No free air. Large volume abdominopelvic ascites.    LYMPH NODES:  Allowing for limitations secondary to lack of intravenous contrast, there is no overt lymphadenopathy.    VESSELS: Aorta tapers normally.  Moderate aortoiliac calcific plaque.    GI TRACT: Distal esophagus and stomach are unremarkable.  No distended bowel loops to suggest bowel obstruction.  Bowel assessment somewhat limited due to lack of contrast.  Appendix appears similar to prior with residual contrast present in the lumen.    BONES AND SOFT TISSUES: Anasarca.   No acute fractures.  Osseous structures appear similar to prior.                                       X-Ray Chest AP Portable (Final result)  Result time 02/22/23 00:18:35      Final result by Gavino Cifuentes MD (02/22/23 00:18:35)                   Impression:       Cardiomegaly with diffuse bilateral airspace infiltrates, similar to prior.  Small right pleural effusion has increased.      Electronically signed by: Gavino Cifuentes MD  Date:    02/22/2023  Time:    00:18               Narrative:    EXAMINATION:  XR CHEST AP PORTABLE    CLINICAL HISTORY:  sob;    TECHNIQUE:  Single frontal view of the chest was performed.    COMPARISON:  01/04/2023.    FINDINGS:  Cardiomegaly with diffuse bilateral airspace infiltrates, similar to prior.  Small right pleural effusion has increased.    Heart and lungs otherwise appear unchanged when allowing for differences in technique and positioning.                                        Assessment/Plan:     * ESRD on dialysis  Hypervolemia  - volume overloaded on exam, anuric   - last HD 2/19/2023  - nephro consulted for HD, appreciate assistance  - HD today for metabolic clearance and volume management   - [facility] HD [facility]  - HD tx days: MWF   - strict I&Os    Class 1 obesity in adult  Body mass index is 30.27 kg/m². Morbid obesity complicates all aspects of disease management from diagnostic modalities to treatment. Weight loss encouraged and health benefits explained to patient.     HLD (hyperlipidemia)  - cont statin    Anemia in ESRD (end-stage renal disease)  - hgb goal 10-11  - current hgb 11.4, iron studies in am if kept overnight     Chronic kidney disease-mineral and bone disorder  -continue sevelamer 800 TID with meals.     Diabetes mellitus with chronic kidney disease on chronic dialysis  Patient's FSGs are uncontrolled due to hyperglycemia on current medication regimen.  Last A1c reviewed-   Lab Results   Component Value Date    HGBA1C 7.5 (H) 12/20/2022     Most recent fingerstick glucose reviewed- No results for input(s): POCTGLUCOSE in the last 24 hours.  Current correctional scale  Low  Maintain anti-hyperglycemic dose as follows-   Antihyperglycemics (From admission, onward)    Start     Stop Route Frequency  Ordered    02/22/23 0900  insulin detemir U-100 pen 5 Units         -- SubQ Daily 02/22/23 0804    02/22/23 0840  insulin aspart U-100 pen 0-5 Units         -- SubQ Before meals & nightly PRN 02/22/23 0743        Hold Oral hypoglycemics while patient is in the hospital.  - SSI during admission, will adjust insulin as needed    Acute on chronic respiratory failure with hypoxia  Patient with Hypoxic Respiratory failure which is Chronic.  he is on home oxygen at 3 LPM. Supplemental oxygen was provided and noted-  .   Signs/symptoms of respiratory failure include- tachypnea and increased work of breathing. Contributing diagnoses includes - COPD, Interstitial lung disease and Pleural effusion Labs and images were reviewed. Patient Has not had a recent ABG. Will treat underlying causes and adjust management of respiratory failure as follows.   - October 2022 found to have multiple PE's and started on eliquis  - cont eliquis during admission   - currently 3L NC (baseline)      VTE Risk Mitigation (From admission, onward)         Ordered     apixaban tablet 5 mg  2 times daily         02/22/23 1114     IP VTE HIGH RISK PATIENT  Once         02/22/23 0743     Place sequential compression device  Until discontinued         02/22/23 0743                   YOBANY MaryC  Department of Hospital Medicine   [facility] - Emergency Dept

## 2023-02-22 NOTE — ASSESSMENT & PLAN NOTE
Patient with Hypoxic Respiratory failure which is Chronic.  he is on home oxygen at 3 LPM. Supplemental oxygen was provided and noted-  .   Signs/symptoms of respiratory failure include- tachypnea and increased work of breathing. Contributing diagnoses includes - COPD, Interstitial lung disease and Pleural effusion Labs and images were reviewed. Patient Has not had a recent ABG. Will treat underlying causes and adjust management of respiratory failure as follows.   - October 2022 found to have multiple PE's and started on eliquis  - cont eliquis during admission   - currently 3L NC (baseline)

## 2023-02-22 NOTE — ASSESSMENT & PLAN NOTE
Hypervolemia  - volume overloaded on exam, anuric   - last HD 2/19/2023  - nephro consulted for HD, appreciate assistance  - HD today for metabolic clearance and volume management   - outpatient HD unit: [facility]  - HD tx days: MWF   - strict I&Os

## 2023-02-22 NOTE — ED NOTES
Pt attempting to take oxygen off for the second time. Re-enforced need for oxygen to pt. Little evidence of learning. Pt remains close to nurses station, call bell in reach and side rails x2. Pt remains on continous pulse ox and cardiac monitoring.

## 2023-02-22 NOTE — ASSESSMENT & PLAN NOTE
Patient's FSGs are uncontrolled due to hyperglycemia on current medication regimen.  Last A1c reviewed-   Lab Results   Component Value Date    HGBA1C 7.5 (H) 12/20/2022     Most recent fingerstick glucose reviewed- No results for input(s): POCTGLUCOSE in the last 24 hours.  Current correctional scale  Low  Maintain anti-hyperglycemic dose as follows-   Antihyperglycemics (From admission, onward)    Start     Stop Route Frequency Ordered    02/22/23 0900  insulin detemir U-100 pen 5 Units         -- SubQ Daily 02/22/23 0804    02/22/23 0840  insulin aspart U-100 pen 0-5 Units         -- SubQ Before meals & nightly PRN 02/22/23 0743        Hold Oral hypoglycemics while patient is in the hospital.  - SSI during admission, will adjust insulin as needed

## 2023-02-22 NOTE — HPI
Cosme Lewis is a 57 y.o. male with PMHx of ESRD on HD, T1DM, COPD on 3L NC baseline, and dementia who is brought by EMS from [facility] due to hypoxia. Patient is demented at baseline and unable to provide adequate history on interview. History provided by EMS, chart review, and ER. Patient reportedly has not been getting breathing treatments at NH, which he is supposed to be getting 4x daily at NH. Per EMS, oxygen increased to 5L during transport and on my exam he has been weaned down to baseline 3L. Patient unable to provide information on HD treatments and what days he has HD. Unable to perform ROS secondary to dementia.     In the ED: Hypertensive, other VSSAF, on 3L NC baseline, normal oxygen saturations. CBC without leukocytosis. CMP with Na 134, otherwise consistent with ESRD. Trop 0.034 >> 0.036 (BL ~ 0.033). BNP > 4,900. EKG showing sinus rhythm with sinus arrhythmia with 1st degree A-V block, nonspecific T wave abnormality, prolonged QT, abnormal ECG when compared to prior. CXR with cardiomegaly with diffuse bilateral airspace infiltrates, similar to prior and small right pleural effusion has increased. CTAP without contrast showing stable bilateral pleural effusions, cardiomegaly, hepatomegaly with possible hepatic venous congestion, large volume abdominopelvic ascites, and anasarca. Received breathing treatments, coreg, lasix 80mg injection, and hydralazine 100mg oral. Nephrology consulted for HD.

## 2023-02-22 NOTE — ED NOTES
Pt had episode of emesis s/p eating lunch tray. About 50 mL caught in emesis bag, unknown amount on pt and floor. While changing pt, he had a moderate amount of diarrhea.

## 2023-02-22 NOTE — ASSESSMENT & PLAN NOTE
Body mass index is 30.27 kg/m². Morbid obesity complicates all aspects of disease management from diagnostic modalities to treatment. Weight loss encouraged and health benefits explained to patient.

## 2023-02-22 NOTE — ED NOTES
Received report and assumed care of patient at this time.     LOC/ APPEARANCE: The patient is alert and oriented to self and place. Pt is speaking with slight slurred speech. Pt is in hospital gown. Continuous cardiac monitor, cont pulse ox, and auto BP cuff applied to patient. Pt is clean and well groomed. No JVD visible. Pt reports pain level of 0. Pt updated on POC. Bed low and locked with side rails up x2, call bell in pt reach.  SKIN: Skin is warm dry and intact, and color is consistent with ethnicity. No breakdown or brusing visible. Mucus membranes moist, acyanotic.  RESPIRATORY: Airway is open and patent. Respirations-spontaneous, slightly labored upon movement. NC in place with 3L O2 running.  regular rate.  CARDIAC: Patient has regular heart rate.  No peripheral edema noted, and patient has no c/o chest pain.   ABDOMEN: Soft and non-tender to palpation with no distention noted. Pt has no complaints of abnormal bowel movements, denies blood in stool. Pt reports normal appetite.   NEUROLOGIC: Eyes open spontaneously and facial expression symmetrical. Pt behavior appropriate to situation, and pt follows commands. Pt reports sensation present in all extremities when touched with a finger, denies any numbness or tingling.   MUSCULOSKELETAL: Spontaneous movement noted to all extremities.   : No complaints of frequency, burning, urgency or blood in the urine. No complaints of incontinenl ce.

## 2023-02-22 NOTE — ED PROVIDER NOTES
Encounter Date: 2/21/2023       History     Chief Complaint   Patient presents with    Shortness of Breath     On 3L baseline, on 5L with EMS arrival. Denies chest pain. On 3L now, 96. From [facility], they didn't know he was supposed to be getting breathing txs 4 times a day.      57-year-old male with history of end-stage renal disease on dialysis, type 1 diabetes, COPD on 3 L nasal cannula at baseline is presenting to the emergency department for shortness of breath.  On interview patient states that he is not have any complaints.  Unable to provide any history secondary to dementia.  Per EMS report patient was hypoxic at nursing home earlier today, oxygen was increased to 5 L by EMS.  EMS also states the patient has not been getting his breathing treatments 4 times a day which she is prescribed while at his nursing home.  Patient is unable to say when his last dialysis was.     The history is provided by the patient. The history is limited by the condition of the patient and the absence of a caregiver. No  was used.   Review of patient's allergies indicates:  No Known Allergies  Past Medical History:   Diagnosis Date    Chronic kidney disease-mineral and bone disorder 10/12/2022    COPD (chronic obstructive pulmonary disease)     Diabetes mellitus type 1     ESRD on dialysis     Hypertension     SOB (shortness of breath) 10/12/2022    Patient with history COPD treated with Ellipta the albuterol, fluticasone-salmeterol tachypneic in the ED saturating 94% and 6 L on nasal cannula, patient does not know how many  he uses it is in nursing home.    -duo nebs Q 4  Given that patient is a poor historian, and in the setting of left lower extremity edema and history of coagulopathy PE cannot be ruled out.  Will workup for possible infec     Past Surgical History:   Procedure Laterality Date    AV FISTULA PLACEMENT       Family History   Problem Relation Age of Onset    Diabetes Mother      Social  History     Tobacco Use    Smoking status: Never    Smokeless tobacco: Never   Substance Use Topics    Alcohol use: Not Currently     Review of Systems   Constitutional:         See HPI     Physical Exam     Initial Vitals   BP Pulse Resp Temp SpO2   02/21/23 2134 02/21/23 2134 02/21/23 2135 02/21/23 2134 02/21/23 2134   100/77 84 (!) 24 97.7 °F (36.5 °C) 98 %      MAP       --                Physical Exam    Nursing note and vitals reviewed.  Constitutional: He is not diaphoretic. No distress.   Chronically ill-appearing, appears older than stated age.   HENT:   Head: Normocephalic and atraumatic.   Eyes: Conjunctivae and EOM are normal.   Neck: Neck supple.   Normal range of motion.  Cardiovascular:  Normal rate, regular rhythm, normal heart sounds and intact distal pulses.           Pulmonary/Chest: No respiratory distress. He has wheezes (Scattered expiratory wheeze). He has no rhonchi. He has no rales.   Abdominal: Abdomen is soft. Bowel sounds are normal. He exhibits no distension. There is no abdominal tenderness. There is no rebound and no guarding.   Musculoskeletal:         General: No tenderness or edema. Normal range of motion.      Cervical back: Normal range of motion and neck supple.      Comments: Fistula left upper extremity with positive thrill     Neurological: He is alert. He has normal strength.   Awake, not oriented to place, time or situation.   Skin: Skin is warm and dry.   Psychiatric: He has a normal mood and affect. Thought content normal.       ED Course   Procedures  Labs Reviewed   CBC W/ AUTO DIFFERENTIAL - Abnormal; Notable for the following components:       Result Value    RBC 3.87 (*)     Hemoglobin 11.4 (*)     Hematocrit 37.2 (*)     MCHC 30.6 (*)     RDW 16.5 (*)     Lymph # 0.7 (*)     Lymph % 15.8 (*)     All other components within normal limits   COMPREHENSIVE METABOLIC PANEL - Abnormal; Notable for the following components:    Sodium 134 (*)     Glucose 362 (*)     BUN 35  (*)     Creatinine 5.2 (*)     Calcium 8.5 (*)     Albumin 2.9 (*)     Alkaline Phosphatase 327 (*)     AST 96 (*)     ALT 79 (*)     eGFR 12.0 (*)     All other components within normal limits   TROPONIN I - Abnormal; Notable for the following components:    Troponin I 0.034 (*)     All other components within normal limits   B-TYPE NATRIURETIC PEPTIDE - Abnormal; Notable for the following components:    BNP >4,900 (*)     All other components within normal limits   PROTIME-INR - Abnormal; Notable for the following components:    Prothrombin Time 15.2 (*)     INR 1.5 (*)     All other components within normal limits   TROPONIN I - Abnormal; Notable for the following components:    Troponin I 0.036 (*)     All other components within normal limits     EKG Readings: (Independently Interpreted)   Initial Reading: No STEMI. Previous EKG: Compared with most recent EKG Rhythm: Normal Sinus Rhythm.     Imaging Results              CT Abdomen Pelvis  Without Contrast (Final result)  Result time 02/22/23 04:27:33      Final result by Gavino Cifuentes MD (02/22/23 04:27:33)                   Impression:      1. Allowing for study limitations secondary to lack of intravenous contrast, no abscess identified.  2. Stable-appearing small bilateral pleural effusions.  3. Stable-appearing bibasilar interlobular septal thickening and consolidative predominantly linear opacities, findings are nonspecific and likely represent inflammation, aspiration or infection.  4. Cardiomegaly.  5. Hepatomegaly, possible hepatic venous congestion.  6. Large volume abdominopelvic ascites.  7. Anasarca.  8. Additional findings as above.    Electronically signed by resident: Sameer Stern  Date:    02/22/2023  Time:    03:50    Electronically signed by: Gavino Cifuentes MD  Date:    02/22/2023  Time:    04:27               Narrative:    EXAMINATION:  CT ABDOMEN PELVIS WITHOUT CONTRAST    CLINICAL HISTORY:  Abdominal abscess/infection  suspected;Elevated LFTs;    TECHNIQUE:  Low dose axial images, sagittal and coronal reformations were obtained from the lung bases to the pubic symphysis without intravenous contrast.   Oral contrast was not administered.    COMPARISON:  CT abdomen pelvis 10/12/2022    FINDINGS:  Evaluation is limited by lack of intravenous contrast and attenuation characteristics of image acquisition.    LUNG BASES & MEDIASTINUM (limited):  Cardiomegaly.  No pericardial effusion.  Multivessel coronary arterial calcific plaque.  Stable small bilateral pleural effusions.  Stable-appearing bibasilar interlobular septal thickening and consolidative predominantly linear opacities.    HEPATOBILIARY: Hepatomegaly.  Intrahepatic IVC and hepatic veins appear prominent, similar to prior.  No focal hepatic lesions.No biliary ductal dilatation.Normal gallbladder.    SPLEEN:  No splenomegaly.    PANCREAS:  No focal masses or ductal dilatation.    ADRENALS:  No adrenal nodules.    KIDNEYS/URETERS:  No hydronephrosis, stones or solid mass lesions. Ureters are unable to be followed throughout their length to the bladder however visualized portions are unremarkable.    PELVIC ORGANS/BLADDER:  Bladder is decompressed limiting evaluation.    PERITONEUM / RETROPERITONEUM:  No free air. Large volume abdominopelvic ascites.    LYMPH NODES:  Allowing for limitations secondary to lack of intravenous contrast, there is no overt lymphadenopathy.    VESSELS: Aorta tapers normally.  Moderate aortoiliac calcific plaque.    GI TRACT: Distal esophagus and stomach are unremarkable.  No distended bowel loops to suggest bowel obstruction.  Bowel assessment somewhat limited due to lack of contrast.  Appendix appears similar to prior with residual contrast present in the lumen.    BONES AND SOFT TISSUES: Anasarca.   No acute fractures.  Osseous structures appear similar to prior.                                       X-Ray Chest AP Portable (Final result)  Result  time 02/22/23 00:18:35      Final result by Gavino Cifuentes MD (02/22/23 00:18:35)                   Impression:      Cardiomegaly with diffuse bilateral airspace infiltrates, similar to prior.  Small right pleural effusion has increased.      Electronically signed by: Gavino Cifuentes MD  Date:    02/22/2023  Time:    00:18               Narrative:    EXAMINATION:  XR CHEST AP PORTABLE    CLINICAL HISTORY:  sob;    TECHNIQUE:  Single frontal view of the chest was performed.    COMPARISON:  01/04/2023.    FINDINGS:  Cardiomegaly with diffuse bilateral airspace infiltrates, similar to prior.  Small right pleural effusion has increased.    Heart and lungs otherwise appear unchanged when allowing for differences in technique and positioning.                                       Medications   furosemide injection 80 mg (has no administration in time range)   albuterol sulfate nebulizer solution 2.5 mg (2.5 mg Nebulization Given 2/22/23 0047)   albuterol-ipratropium 2.5 mg-0.5 mg/3 mL nebulizer solution 3 mL (3 mLs Nebulization Given 2/22/23 0047)   carvediloL tablet 6.25 mg (6.25 mg Oral Given 2/22/23 0427)   hydrALAZINE tablet 100 mg (100 mg Oral Given 2/22/23 0427)     Medical Decision Making:   Initial Assessment:   57-year-old male with history of end-stage renal disease on dialysis, type 1 diabetes, COPD on 3 L nasal cannula at baseline is presenting to the emergency department for shortness of breath.  He presents with normal vital signs and has baseline oxygen requirement on arrival.  Labs and imaging ordered.  Differential Diagnosis:   CHF exacerbation, end-stage renal disease, volume overload, pneumonia, ACS, COPD  Clinical Tests:   Lab Tests: Ordered and Reviewed  Radiological Study: Reviewed and Ordered  Medical Tests: Ordered and Reviewed  ED Management:  See ED course.  Picture consistent with volume overload.  Unclear when patient's last dialysis was an unable to get in contact with nursing home.  He is  due for dialysis today.  Will admit patient for dialysis, continued monitoring and treatment.           ED Course as of 02/22/23 0627   Tue Feb 21, 2023   2349 WBC: 4.19  No leukocytosis. [KL]   Wed Feb 22, 2023   0000 BNP(!): >4,900  Elevated compared to patient baseline.  Patient is end-stage renal, unknown last dialysis however because patient is demented. [KL]   0000 BUN(!): 35 [KL]   0000 Creatinine(!): 5.2  End-stage renal disease, slightly worse than baseline. [KL]   0001 Troponin I(!): 0.034  Troponin slightly elevated, suspect secondary to impaired kidney function.  Will trend. [KL]   0001 Alkaline Phosphatase(!): 327 [KL]   0001 AST(!): 96 [KL]   0001 ALT(!): 79  Elevated transaminases, acutely.  Abdomen is soft and nontender.  Patient is confused and poor historian however.  [KL]   0018 BP(!): 199/105  Home blood pressure medications ordered. [KL]   0034 Chest x-ray with bilateral infiltrates and pleural effusion.  Consistent with elevated BNP and clinical picture of volume overload.  It is unclear when patient last had dialysis. [KL]   0442 Troponin I(!): 0.036  Stable [KL]   0540 Patient's dialysis schedule is Monday Wednesday Friday.  Patient should receive dialysis [KL]      ED Course User Index  [KL] Silvia Herrera MD                 Clinical Impression:   Final diagnoses:  [E87.70] Hypervolemia, unspecified hypervolemia type (Primary)  [N18.6] End stage renal disease  [J44.1] COPD with acute exacerbation  [I50.9] Acute on chronic congestive heart failure, unspecified heart failure type        ED Disposition Condition    Observation                 Silvia Herrera MD  Resident  02/22/23 0625       Silvia Herrera MD  Resident  02/22/23 0028

## 2023-02-22 NOTE — ED TRIAGE NOTES
Pt arrives from [facility] for sob. Pt states he normally wears 3L nasal cannula. Pt is slightly confused and cannot tell when his last dialysis was. Pt denies any cp, cough, or fevers.     Past Medical History:   Diagnosis Date    Chronic kidney disease-mineral and bone disorder 10/12/2022    COPD (chronic obstructive pulmonary disease)     Diabetes mellitus type 1     ESRD on dialysis     Hypertension     SOB (shortness of breath) 10/12/2022    Patient with history COPD treated with Ellipta the albuterol, fluticasone-salmeterol tachypneic in the ED saturating 94% and 6 L on nasal cannula, patient does not know how many  he uses it is in nursing home.    -duo nebs Q 4  Given that patient is a poor historian, and in the setting of left lower extremity edema and history of coagulopathy PE cannot be ruled out.  Will workup for possible infec       Past Surgical History:   Procedure Laterality Date    AV FISTULA PLACEMENT         Family History   Problem Relation Age of Onset    Diabetes Mother        Social History     Socioeconomic History    Marital status:    Tobacco Use    Smoking status: Never    Smokeless tobacco: Never   Substance and Sexual Activity    Alcohol use: Not Currently    Sexual activity: Not Currently       Current Facility-Administered Medications   Medication Dose Route Frequency Provider Last Rate Last Admin    albuterol sulfate nebulizer solution 2.5 mg  2.5 mg Nebulization Q5 Min Silvia Herrera MD        albuterol-ipratropium 2.5 mg-0.5 mg/3 mL nebulizer solution 3 mL  3 mL Nebulization Q5 Min Silvia Herrera MD         Current Outpatient Medications   Medication Sig Dispense Refill    acetaminophen (TYLENOL) 650 MG TbSR Take 650 mg by mouth every 8 (eight) hours as needed (pain or fever over 100.4).      albuterol (PROVENTIL/VENTOLIN HFA) 90 mcg/actuation inhaler Inhale 2 puffs into the lungs every 6 (six) hours as needed for Wheezing or Shortness of Breath.      aspirin  (ECOTRIN) 81 MG EC tablet Take 81 mg by mouth once daily.      atorvastatin (LIPITOR) 40 MG tablet Take 1 tablet (40 mg total) by mouth once daily. 90 tablet 3    carvediloL (COREG) 3.125 MG tablet Take 2 tablets (6.25 mg total) by mouth 2 (two) times daily. 120 tablet 11    cetirizine (ZYRTEC) 10 MG tablet Take 10 mg by mouth daily as needed (itching).      cloNIDine 0.3 mg/24 hr td ptwk (CATAPRES) 0.3 mg/24 hr Place 1 patch onto the skin every Saturday.      erythromycin (ROMYCIN) ophthalmic ointment Place a 1/2 inch ribbon of ointment into the lower eyelid three times a day. 3.5 g 0    FLUoxetine 40 MG capsule Take 40 mg by mouth once daily.      fluticasone-salmeterol diskus inhaler 250-50 mcg Inhale 1 puff into the lungs 2 (two) times daily.      GLUCAGON EMERGENCY KIT, HUMAN, 1 mg injection 1 mg into the muscle as needed if CBG < 70      hydrALAZINE (APRESOLINE) 100 MG tablet Take 1 tablet (100 mg total) by mouth every 8 (eight) hours.      HYDROPHILIC CREAM TOP Apply topically. Apply liberal amount to affected area twice daily for dry skin      insulin aspart U-100 (NOVOLOG) 100 unit/mL (3 mL) InPn pen Inject 5 Units into the skin 3 (three) times daily with meals. 12 mL 3    insulin detemir U-100 (LEVEMIR FLEXTOUCH) 100 unit/mL (3 mL) SubQ InPn pen Inject 10 Units into the skin once daily. 12 mL 3    isosorbide mononitrate (IMDUR) 30 MG 24 hr tablet Take 30 mg by mouth 2 (two) times daily.      melatonin 3 mg TbDL Take 9 mg by mouth nightly as needed (sleep).      NIFEdipine (PROCARDIA-XL) 90 MG (OSM) 24 hr tablet Take 1 tablet (90 mg total) by mouth once daily. 30 tablet 11    sodium chloride (OCEAN) 0.65 % nasal spray 2 sprays by Nasal route every 4 (four) hours as needed for Congestion.      tiotropium bromide (SPIRIVA RESPIMAT) 2.5 mcg/actuation inhaler Inhale 2 puffs into the lungs Daily.      triamcinolone acetonide 0.025 % Lotn Apply topically. Apply to the affected area twice daily      vitamin renal  formula, B-complex-vitamin c-folic acid, (NEPHROCAP) 1 mg Cap Take 1 capsule by mouth once daily.         Review of patient's allergies indicates:  No Known Allergies

## 2023-02-22 NOTE — PROGRESS NOTES
Patient currently on hemodialysis for removal of uremic toxins and volume control.   I personally saw and examined the patient on hemodialysis.  The patient is tolerating the treatment, see hemodyalisis flow sheet for vitals and assessemts. I also reviewed the chart and current medication and the recent lab work. The dialysis bath was adjusted accordingly.   Pulmonary edema and BNP >4900    Volume management/HTN: will aim for 3.5 liter UF, if tolerated  Anemia (target 10-12 mg/dl): within target  Nutrition: continue renal diet, should be on protein supplements.

## 2023-02-22 NOTE — ED NOTES
Assumed care of pt. Pt resting in semifowlers. No needs/complaints voiced. Denies pain    Patient identifiers verified and correct for marcus buck  LOC: The patient is awake, alert and oriented to self. Appropriate affect  APPEARANCE: Patient appears comfortable and in no acute distress, patient is clean and well groomed.  SKIN: The skin is warm and dry, color consistent with ethnicity, patient has normal skin turgor and moist mucus membranes, skin intact   MUSCULOSKELETAL: Patient moving all extremities spontaneously, no edema noted. Moves independently in bed  RESPIRATORY: Airway is open and patent, respirations are spontaneous, patient has a normal effort and rate, no accessory muscle use noted, pt remains on continuous pulse ox with O2 sats noted at 96% on 3L NC  CARDIAC: pt remains on cardiac monitor. Denies chest pain. NSR noted on monitor  GASTRO: soft and non-tender to palpation. Noted rounded abdomen. Denies n/v/d/abd pain  : Pt denies any pain or frequency with urination.  NEURO: Pt opens eyes spontaneously, behavior appropriate to situation, follows commands, facial expression symmetrical, bilateral hand grasp equal and even, purposeful motor response noted,clear speech noted.

## 2023-02-23 NOTE — PLAN OF CARE
Pt admitted and care plan initiated.Telemetry maintained,NSR.O2 maintained at 3l nc satting 93-95%the patient removes o2 at times and immid becomes sob and desats the patient also takes telemetry off at times.pt able to answer questions approp.oriented to self,place,year.confused to situation this am but reorients easily.pt has had 3 soft brown bms tonight.dressing to lue,dialysis graft site clean,dry and intact.+ thrill and bruit.safety precautions maintained.bed in low position.rails up x3.call bell in reach.bed alarm in use for pt safety.continue plan of care.

## 2023-02-23 NOTE — PLAN OF CARE
Pt to dc back to [facility]    NH Orders, Progress Notes and H&P sent.  Transport set up and booked for a opick up kf 1750 - [phone].  RN made aware of pt's  time.

## 2023-02-23 NOTE — PLAN OF CARE
[facility] - Observation 11H  Initial Discharge Assessment       Primary Care Provider: Primary Doctor No    Admission Diagnosis: End stage renal disease [N18.6]  ESRD on dialysis [N18.6, Z99.2]  Chest pain [R07.9]  COPD with acute exacerbation [J44.1]  Hypervolemia, unspecified hypervolemia type [E87.70]  Acute on chronic congestive heart failure, unspecified heart failure type [I50.9]    Admission Date: 2/21/2023  Expected Discharge Date: 2/23/2023    Discharge Barriers Identified: None    Payor: MEDICARE / Plan: MEDICARE PART A & B / Product Type: Government /     Extended Emergency Contact Information  Primary Emergency Contact: Kassandra Leon  Mobile Phone: [phone]  Relation: Mother    Discharge Plan A: Home       No Pharmacies Listed    Initial Assessment (most recent)       Adult Discharge Assessment - 02/23/23 1554          Discharge Assessment    Assessment Type Discharge Planning Assessment     Confirmed/corrected address, phone number and insurance Yes     Source of Information health record     Facility Arrived From: [facility]     Do you expect to return to your current living situation? Yes     Prior to hospitilization cognitive status: Not Oriented to Place     Current cognitive status: Not Oriented to Place     Readmission within 30 days? No     Patient currently being followed by outpatient case management? No     Do you currently have service(s) that help you manage your care at home? No     Do you take prescription medications? No     Do you have prescription coverage? Yes     Do you have any problems affording any of your prescribed medications? No     Who is going to help you get home at discharge? will need transport.     How do you get to doctors appointments? other (see comments)     Are you on dialysis? No     Do you take coumadin? No     Discharge Plan A Home     DME Needed Upon Discharge  none     Discharge Barriers Identified None                   Assessment completed.     Pt comes from [facility]  Pt's medication and medical state is managed by the Nursing Home.    DC Plan is t return to [facility].

## 2023-02-23 NOTE — PLAN OF CARE
NURSING HOME ORDERS    02/23/2023  [facility]  [facility] - OBSERVATION 11H  [address]  [facility] [address]  Dept: [phone]  Loc: [phone]     Admit to Nursing Home:  [facility]    Diagnoses:  Active Hospital Problems    Diagnosis  POA    *ESRD on dialysis [N18.6, Z99.2]  Not Applicable    Hypervolemia [E87.70]  Yes    Class 1 obesity in adult [E66.9]  Yes    Anemia in ESRD (end-stage renal disease) [N18.6, D63.1]  Yes    HLD (hyperlipidemia) [E78.5]  Yes    Acute on chronic respiratory failure with hypoxia [J96.21]  Yes    Diabetes mellitus with chronic kidney disease on chronic dialysis [E10.22, N18.6, Z99.2]  Not Applicable    Chronic kidney disease-mineral and bone disorder [N18.9, E83.9, M89.9]  Yes      Resolved Hospital Problems   No resolved problems to display.       Patient is homebound due to:  ESRD on dialysis    Allergies:Review of patient's allergies indicates:  No Known Allergies    Vitals:  Every shift    Diet: diabetic diet: 2000 calorie    Activities:   Activity as tolerated    Goals of Care Treatment Preferences:  Code Status: Full Code      Labs:  per facility    Nursing Precautions:  Aspiration , Fall, and Pressure ulcer prevention    Consults:   none    Miscellaneous Care: Diabetes Care: Diabetes: Check blood sugar. Fingerstick blood sugar AC and HS  Sliding Scale/Hypoglycemia Protocol: For FSG<80, give 1 amp D50 or 1 glucose tablet. For FSG , do nothing. For -200, give 1 unit of novolog in addition to pre-meal insulin. For -250, give 2 units of novolog in addition to pre-meal insulin. For -300, give 3 units of novolog in addition to pre-meal insulin. For 301-350, give 4 units of novolog in addition to pre-meal insulin. For 351-400, give 5 units of novolog in addition to pre-meal insulin. For FSG >400, give 5 units of novolog in addition to pre-meal insulin and please call MD                   Diabetes Care:  SN to  perform and educate Diabetic management with blood glucose monitoring:, Fingerstick blood sugar AC and HS, and Report CBG < 60 or > 350 to physician.      Medications: Discontinue all previous medication orders, if any. See new list below.     Medication List        START taking these medications      albuterol-ipratropium 2.5 mg-0.5 mg/3 mL nebulizer solution  Commonly known as: DUO-NEB  Take 3 mLs by nebulization every 4 (four) hours while awake. Rescue            CONTINUE taking these medications      acetaminophen 650 MG Tbsr  Commonly known as: TYLENOL  Take 650 mg by mouth every 8 (eight) hours as needed (pain or fever over 100.4).     albuterol 90 mcg/actuation inhaler  Commonly known as: PROVENTIL/VENTOLIN HFA  Inhale 2 puffs into the lungs every 6 (six) hours as needed for Wheezing or Shortness of Breath.     aspirin 81 MG EC tablet  Commonly known as: ECOTRIN  Take 81 mg by mouth once daily.     atorvastatin 40 MG tablet  Commonly known as: LIPITOR  Take 1 tablet (40 mg total) by mouth once daily.     carvediloL 3.125 MG tablet  Commonly known as: COREG  Take 2 tablets (6.25 mg total) by mouth 2 (two) times daily.     cetirizine 10 MG tablet  Commonly known as: ZYRTEC  Take 10 mg by mouth daily as needed (itching).     cloNIDine 0.3 mg/24 hr td ptwk 0.3 mg/24 hr  Commonly known as: CATAPRES  Place 1 patch onto the skin every Saturday.     ELIQUIS 5 mg Tab  Generic drug: apixaban  Take 5 mg by mouth 2 (two) times daily.     erythromycin ophthalmic ointment  Commonly known as: ROMYCIN  Place a 1/2 inch ribbon of ointment into the lower eyelid three times a day.     FLUoxetine 40 MG capsule  Take 40 mg by mouth once daily.     fluticasone-salmeterol 250-50 mcg/dose 250-50 mcg/dose diskus inhaler  Commonly known as: ADVAIR  Inhale 1 puff into the lungs 2 (two) times daily.     GLUCAGON EMERGENCY KIT (HUMAN) 1 mg Solr  Generic drug: glucagon  1 mg into the muscle as needed if CBG < 70     hydrALAZINE 100 MG  tablet  Commonly known as: APRESOLINE  Take 1 tablet (100 mg total) by mouth every 8 (eight) hours.     HYDROPHILIC CREAM TOP  Apply liberal amount to affected area twice daily for dry skin     isosorbide mononitrate 30 MG 24 hr tablet  Commonly known as: IMDUR  Take 30 mg by mouth 2 (two) times daily.     melatonin 3 mg Tbdl  Take 9 mg by mouth nightly as needed (sleep).     NEPRO CARB STEADY ORAL  Give 1 carton by mouth with each meal.     NIFEdipine 90 MG (OSM) 24 hr tablet  Commonly known as: PROCARDIA-XL  Take 1 tablet (90 mg total) by mouth once daily.     sodium chloride 0.65 % nasal spray  Commonly known as: OCEAN  2 sprays by Nasal route every 4 (four) hours as needed for Congestion.     tiotropium bromide 2.5 mcg/actuation inhaler  Commonly known as: SPIRIVA RESPIMAT  Inhale 2 puffs into the lungs Daily.     triamcinolone acetonide 0.025 % Lotn  Apply topically. Apply to the affected area twice daily     vitamin renal formula (B-complex-vitamin c-folic acid) 1 mg Cap  Commonly known as: NEPHROCAP  Take 1 capsule by mouth once daily.            ASK your doctor about these medications      insulin aspart U-100 100 unit/mL (3 mL) Inpn pen  Commonly known as: NovoLOG  Inject 5 Units into the skin 3 (three) times daily with meals.     insulin detemir U-100 100 unit/mL (3 mL) Inpn pen  Commonly known as: Levemir FLEXTOUCH  Inject 10 Units into the skin once daily.                Immunizations Administered as of 2/23/2023       No immunizations on file.            Unknown    Some patients may experience side effects after vaccination.  These may include fever, headache, muscle or joint aches.  Most symptoms resolve with 24-48 hours and do not require urgent medical evaluation unless they persist for more than 72 hours or symptoms are concerning for an unrelated medical condition.          _________________________________  Marychuy Tam PA-C  02/23/2023

## 2023-02-23 NOTE — PROGRESS NOTES
HD TX completed.  Ran for 3 hrs.  Net fluid removed is 3.5 liters.  VSS.  Tolerated dialysis well.  Needles removed from exit sites.  Pressure applied, hemostasis achieved.  Report given to primary nurse.  Returning to floor via stretcher.

## 2023-02-23 NOTE — NURSING TRANSFER
Nursing Transfer Note      Pt arrived from the er via stretcher accompanied by transporter.arrived on Telemetry,NSR.O2 in progress at 3l nc with sats of 95%. Reymundo breath sounds diminished.SOB noted on minimal exertion.face puffy.dressing to lue, dialysis graft site clean,dry and intact,+thrill and bruit.arrived soiled with soft brown stool.incontinence care provided.dinner served.safety precautions implemented.bed in low position.rails up x3.bed alarm activated for pt safety.call bell in reach.will monitor.

## 2023-02-24 NOTE — NURSING
Pt discharged to [facility] via ambulance accompanied by 2 paramedics.awake and alert.oriented to self.time and place.confused to situation.o2 in progress at 3l nc.vss.resp even and nonlabored.no distress noted.  
Report given to ADELITA Moralez at [facility].  
Telebox [id number] tubed to station 21  
No

## 2023-02-24 NOTE — DISCHARGE SUMMARY
[facility] - Observation [address] [place] [address] Medicine  Discharge Summary      Patient Name: Cosme Lewis  MRN: [id number]  MARCY: [id number]  Patient Class: OP- Observation  Admission Date: 2/21/2023  Hospital Length of Stay: 0 days  Discharge Date and Time: 2/23/2023  9:02 PM  Attending Physician: Rochelle att. providers found   Discharging Provider: Marychuy Tam PA-C  Primary Care Provider: Primary Doctor Rochelle  [facility] Medicine Team: [facility] Marychuy Tam PA-C  Primary Care Team: [facility]    HPI:   Cosme Lewis is a 57 y.o. male with PMHx of ESRD on HD, T1DM, COPD on 3L NC baseline, and dementia who is brought by EMS from [facility] due to hypoxia. Patient is demented at baseline and unable to provide adequate history on interview. History provided by EMS, chart review, and ER. Patient reportedly has not been getting breathing treatments at NH, which he is supposed to be getting 4x daily at NH. Per EMS, oxygen increased to 5L during transport and on my exam he has been weaned down to baseline 3L. Patient unable to provide information on HD treatments and what days he has HD. Unable to perform ROS secondary to dementia.     In the ED: Hypertensive, other VSSAF, on 3L NC baseline, normal oxygen saturations. CBC without leukocytosis. CMP with Na 134, otherwise consistent with ESRD. Trop 0.034 >> 0.036 (BL ~ 0.033). BNP > 4,900. EKG showing sinus rhythm with sinus arrhythmia with 1st degree A-V block, nonspecific T wave abnormality, prolonged QT, abnormal ECG when compared to prior. CXR with cardiomegaly with diffuse bilateral airspace infiltrates, similar to prior and small right pleural effusion has increased. CTAP without contrast showing stable bilateral pleural effusions, cardiomegaly, hepatomegaly with possible hepatic venous congestion, large volume abdominopelvic ascites, and anasarca. Received breathing treatments, coreg, lasix 80mg injection, and hydralazine 100mg oral. Nephrology consulted for  HD.      * No surgery found *      Hospital Course:   Cosme Lewis is a 56 yo M placed in observation for hypoxia. Nephrology consulted for volume overload and patient received HD on 02/22. Weaned down to home 3L. Started scheduled duo nebs. Discharged back to [facility] in stable condition. Will resume normal HD schedule on 02/24.        Goals of Care Treatment Preferences:  Code Status: Full Code      Consults:   Consults (From admission, onward)        Status Ordering Provider     IP consult to case management  Once        Provider:  (Not yet assigned)    CROW Kaur     Inpatient consult to Nephrology  Once        Provider:  (Not yet assigned)    Completed ELSA CHASE            Final Active Diagnoses:    Diagnosis Date Noted POA    PRINCIPAL PROBLEM:  ESRD on dialysis [N18.6, Z99.2]  Not Applicable    Hypervolemia [E87.70] 02/22/2023 Yes    Class 1 obesity in adult [E66.9] 02/22/2023 Yes    Anemia in ESRD (end-stage renal disease) [N18.6, D63.1] 11/18/2022 Yes    HLD (hyperlipidemia) [E78.5] 11/18/2022 Yes    Acute on chronic respiratory failure with hypoxia [J96.21] 10/12/2022 Yes    Diabetes mellitus with chronic kidney disease on chronic dialysis [E10.22, N18.6, Z99.2] 10/12/2022 Not Applicable    Chronic kidney disease-mineral and bone disorder [N18.9, E83.9, M89.9] 10/12/2022 Yes      Problems Resolved During this Admission:       Discharged Condition: stable    Disposition: retirement Nursing Home    Follow Up:    Patient Instructions:      Diet diabetic     Activity as tolerated         Pending Diagnostic Studies:     None         Medications:  Reconciled Home Medications:      Medication List      START taking these medications    albuterol-ipratropium 2.5 mg-0.5 mg/3 mL nebulizer solution  Commonly known as: DUO-NEB  Take 3 mLs by nebulization every 4 (four) hours while awake. Rescue        CONTINUE taking these medications    acetaminophen 650 MG Tbsr  Commonly known as:  TYLENOL  Take 650 mg by mouth every 8 (eight) hours as needed (pain or fever over 100.4).     albuterol 90 mcg/actuation inhaler  Commonly known as: PROVENTIL/VENTOLIN HFA  Inhale 2 puffs into the lungs every 6 (six) hours as needed for Wheezing or Shortness of Breath.     aspirin 81 MG EC tablet  Commonly known as: ECOTRIN  Take 81 mg by mouth once daily.     atorvastatin 40 MG tablet  Commonly known as: LIPITOR  Take 1 tablet (40 mg total) by mouth once daily.     carvediloL 3.125 MG tablet  Commonly known as: COREG  Take 2 tablets (6.25 mg total) by mouth 2 (two) times daily.     cetirizine 10 MG tablet  Commonly known as: ZYRTEC  Take 10 mg by mouth daily as needed (itching).     cloNIDine 0.3 mg/24 hr td ptwk 0.3 mg/24 hr  Commonly known as: CATAPRES  Place 1 patch onto the skin every Saturday.     ELIQUIS 5 mg Tab  Generic drug: apixaban  Take 5 mg by mouth 2 (two) times daily.     erythromycin ophthalmic ointment  Commonly known as: ROMYCIN  Place a 1/2 inch ribbon of ointment into the lower eyelid three times a day.     FLUoxetine 40 MG capsule  Take 40 mg by mouth once daily.     fluticasone-salmeterol 250-50 mcg/dose 250-50 mcg/dose diskus inhaler  Commonly known as: ADVAIR  Inhale 1 puff into the lungs 2 (two) times daily.     GLUCAGON EMERGENCY KIT (HUMAN) 1 mg Solr  Generic drug: glucagon  1 mg into the muscle as needed if CBG < 70     hydrALAZINE 100 MG tablet  Commonly known as: APRESOLINE  Take 1 tablet (100 mg total) by mouth every 8 (eight) hours.     HYDROPHILIC CREAM TOP  Apply liberal amount to affected area twice daily for dry skin     isosorbide mononitrate 30 MG 24 hr tablet  Commonly known as: IMDUR  Take 30 mg by mouth 2 (two) times daily.     melatonin 3 mg Tbdl  Take 9 mg by mouth nightly as needed (sleep).     NEPRO CARB STEADY ORAL  Give 1 carton by mouth with each meal.     NIFEdipine 90 MG (OSM) 24 hr tablet  Commonly known as: PROCARDIA-XL  Take 1 tablet (90 mg total) by mouth once  daily.     sodium chloride 0.65 % nasal spray  Commonly known as: OCEAN  2 sprays by Nasal route every 4 (four) hours as needed for Congestion.     tiotropium bromide 2.5 mcg/actuation inhaler  Commonly known as: SPIRIVA RESPIMAT  Inhale 2 puffs into the lungs Daily.     triamcinolone acetonide 0.025 % Lotn  Apply topically. Apply to the affected area twice daily     vitamin renal formula (B-complex-vitamin c-folic acid) 1 mg Cap  Commonly known as: NEPHROCAP  Take 1 capsule by mouth once daily.        ASK your doctor about these medications    insulin aspart U-100 100 unit/mL (3 mL) Inpn pen  Commonly known as: NovoLOG  Inject 5 Units into the skin 3 (three) times daily with meals.     insulin detemir U-100 100 unit/mL (3 mL) Inpn pen  Commonly known as: Levemir FLEXTOUCH  Inject 10 Units into the skin once daily.            Indwelling Lines/Drains at time of discharge:   Lines/Drains/Airways     Drain  Duration                Hemodialysis AV Fistula Left upper arm -- days         Hemodialysis AV Fistula Left upper arm -- days                Time spent on the discharge of patient: 35 minutes         Marychuy Tam PA-C  Department of Hospital Medicine  [facility] - Observation 11H

## 2023-02-24 NOTE — HOSPITAL COURSE
Cosme Lewis is a 58 yo M placed in observation for hypoxia. Nephrology consulted for volume overload and patient received HD on 02/22. Weaned down to home 3L. Started scheduled duo nebs. Discharged back to [facility] in stable condition. Will resume normal HD schedule on 02/24.

## 2023-02-27 PROBLEM — J96.11 CHRONIC HYPOXEMIC RESPIRATORY FAILURE: Chronic | Status: RESOLVED | Noted: 2022-10-12 | Resolved: 2023-01-01

## 2023-03-04 PROBLEM — I16.1 HYPERTENSIVE EMERGENCY: Status: ACTIVE | Noted: 2023-01-01

## 2023-03-04 PROBLEM — E87.1 HYPONATREMIA: Status: ACTIVE | Noted: 2023-01-01

## 2023-03-04 PROBLEM — I50.32 CHRONIC DIASTOLIC HEART FAILURE: Status: ACTIVE | Noted: 2022-10-12

## 2023-03-04 PROBLEM — R74.8 ELEVATED LIVER ENZYMES: Status: ACTIVE | Noted: 2023-01-01

## 2023-03-04 NOTE — ED NOTES
I-STAT Chem-8+ Results:   Value Reference Range   Sodium 129 136-145 mmol/L   Potassium  3.7 3.5-5.1 mmol/L   Chloride 95  mmol/L   Ionized Calcium 0.77 1.06-1.42 mmol/L   CO2 (measured) 28 23-29 mmol/L   Glucose 686  mg/dL   BUN 29 6-30 mg/dL   Creatinine 3.3 0.5-1.4 mg/dL   Hematocrit 36 36-54%

## 2023-03-04 NOTE — ED TRIAGE NOTES
Cosme Lewis, a 57 y.o. male presents to the ED w/ complaint of hyperglycemia    Sats: 87 on home concentrator of 3L. Up to 90 on 4L via EMS. 200 ml NS in route.    Glucose <500 from [facility].    Last dialysis yesterday, received full tx. Receives dialysis: MWF    Triage note:  Chief Complaint   Patient presents with    Hyperglycemia     From [facility]     Review of patient's allergies indicates:  No Known Allergies  Past Medical History:   Diagnosis Date    Chronic kidney disease-mineral and bone disorder 10/12/2022    COPD (chronic obstructive pulmonary disease)     Diabetes mellitus type 1     ESRD on dialysis     Hypertension     SOB (shortness of breath) 10/12/2022    Patient with history COPD treated with Ellipta the albuterol, fluticasone-salmeterol tachypneic in the ED saturating 94% and 6 L on nasal cannula, patient does not know how many  he uses it is in nursing home.    -duo nebs Q 4  Given that patient is a poor historian, and in the setting of left lower extremity edema and history of coagulopathy PE cannot be ruled out.  Will workup for possible infec

## 2023-03-04 NOTE — ED PROVIDER NOTES
Encounter Date: 3/4/2023       History     Source of history:  Patient, EMS.    History obtained without limitations.      HPI:  The patient is a 57-year-old with the below past medical history.  That history includes COPD and ESRD.  He undergoes hemodialysis on Mondays, Wednesdays, and Fridays and denies missing recent treatments.  He is a nursing home resident.  The nursing home staff symptoms the ED for evaluation hyperglycemia.  The transporting medics report fingerstick glucose of greater than 500 mg/dL.  They also report that the patient was in respiratory distress and hypoxic on his regular dose supplemental oxygen (3 L nasal cannula).  He had an initial pulse oximetry reading of 87%.  He was placed on 6 L and had improvement to 90%.  He complains cough with grossly bloody sputum for three days and increased shortness of breath from his baseline.  He denies abdominal pain.  He vomited twice over the last three days while attempting to have a bowel movement.  He is had normal bowel movements as well.  He denies fever, chills, headache, dizziness, and chest pain.      Review of patient's allergies indicates:  No Known Allergies  Past Medical History:   Diagnosis Date    Chronic kidney disease-mineral and bone disorder 10/12/2022    COPD (chronic obstructive pulmonary disease)     Diabetes mellitus type 1     ESRD on dialysis     Hypertension     Hypervolemia 2/22/2023    Hyponatremia 3/4/2023    SOB (shortness of breath) 10/12/2022    Patient with history COPD treated with Ellipta the albuterol, fluticasone-salmeterol tachypneic in the ED saturating 94% and 6 L on nasal cannula, patient does not know how many  he uses it is in nursing home.    -duo nebs Q 4  Given that patient is a poor historian, and in the setting of left lower extremity edema and history of coagulopathy PE cannot be ruled out.  Will workup for possible infec     Past Surgical History:   Procedure Laterality Date    AV FISTULA PLACEMENT        Family History   Problem Relation Age of Onset    Diabetes Mother      Social History     Tobacco Use    Smoking status: Never    Smokeless tobacco: Never   Substance Use Topics    Alcohol use: Not Currently     Review of Systems  See HPI.      Physical Exam     Initial Vitals [03/04/23 1437]   BP Pulse Resp Temp SpO2   (!) 200/100 80 (!) 22 97.9 °F (36.6 °C) (!) 90 %      MAP       --         Physical Exam    Nursing note and vitals reviewed.  Constitutional: He is not diaphoretic. He appears distressed.   HENT:   Head: Normocephalic and atraumatic.   Mouth/Throat: Oropharynx is clear and moist.   Eyes: No scleral icterus.   Neck: No JVD present.   Cardiovascular:  Normal rate, regular rhythm and normal heart sounds.     Exam reveals no gallop and no friction rub.       No murmur heard.  Pulmonary/Chest: Accessory muscle usage present. No stridor. Tachypnea noted. He is in respiratory distress. He has no wheezes. He has no rhonchi. He has rales.   Patient is speaking in 2-3 word phrases while on supplemental oxygen.   Abdominal: He exhibits no distension. There is no abdominal tenderness.   Musculoskeletal:         General: No edema.     Neurological: He is alert and oriented to person, place, and time. GCS score is 15. GCS eye subscore is 4. GCS verbal subscore is 5. GCS motor subscore is 6.   Skin: Skin is warm and dry. No pallor.       ED Course   Procedures  Labs Reviewed   CBC W/ AUTO DIFFERENTIAL - Abnormal; Notable for the following components:       Result Value    RBC 3.72 (*)     Hemoglobin 11.1 (*)     Hematocrit 35.6 (*)     MCHC 31.2 (*)     RDW 15.1 (*)     Lymph # 0.4 (*)     Gran % 79.8 (*)     Lymph % 10.5 (*)     All other components within normal limits   TROPONIN I - Abnormal; Notable for the following components:    Troponin I 0.028 (*)     All other components within normal limits   B-TYPE NATRIURETIC PEPTIDE - Abnormal; Notable for the following components:    BNP 4,520 (*)     All other  components within normal limits   COMPREHENSIVE METABOLIC PANEL - Abnormal; Notable for the following components:    Sodium 130 (*)     CO2 19 (*)     Glucose 691 (*)     BUN 29 (*)     Creatinine 3.3 (*)     Calcium 7.7 (*)     Albumin 2.9 (*)     Alkaline Phosphatase 305 (*)      (*)     ALT 73 (*)     eGFR 20.7 (*)     All other components within normal limits   OSMOLALITY, SERUM - Abnormal; Notable for the following components:    Osmolality 315 (*)     All other components within normal limits    Narrative:     add on osmolality per MD ML SAMPSON  03/04/2023  19:59    ISTAT PROCEDURE - Abnormal; Notable for the following components:    POC PCO2 48.5 (*)     POC HCO3 31.4 (*)     POC SATURATED O2 80 (*)     POC TCO2 33 (*)     All other components within normal limits   ISTAT PROCEDURE - Abnormal; Notable for the following components:    POC Glucose 686 (*)     POC Creatinine 3.3 (*)     POC Sodium 129 (*)     POC Ionized Calcium 0.77 (*)     All other components within normal limits   POCT GLUCOSE - Abnormal; Notable for the following components:    POCT Glucose >500 (*)     All other components within normal limits   BETA - HYDROXYBUTYRATE, SERUM   SARS-COV2 (COVID) WITH FLU/RSV BY PCR   OSMOLALITY, SERUM   MAGNESIUM   MAGNESIUM    Narrative:     ADD ON MAGNESIUM PER DR MELIZA TA/ORDER# [id number] @ 20:51     EKG Readings: (Independently Interpreted)   Time of study: 03/04/2023 15:02   Sinus rhythm with first-degree AV block.  Ventricular rate 79 beats per minute.  Normal axis.  Normal QRS interval.  Prolonged QTc interval (509 ms).  No ST segment elevation or depression.       Imaging Results              US Abdomen Limited (Final result)  Result time 03/05/23 03:48:29      Final result by Aguilar Garcia MD (03/05/23 03:48:29)                   Impression:      No acute sonographic abnormality.    Ascites.    Hepatomegaly.    Diffuse gallbladder wall thickening without cholelithiasis, likely  related to underlying liver dysfunction and ascites.    Appearance of the right kidney suggestive of medical renal disease.    Electronically signed by resident: Sameer Stern  Date:    03/05/2023  Time:    02:56    Electronically signed by: Aguilar Garcia MD  Date:    03/05/2023  Time:    03:48               Narrative:    EXAMINATION:  US ABDOMEN LIMITED    CLINICAL HISTORY:  Elevated liver enzymes, abdominal distension;.    TECHNIQUE:  Limited ultrasound of the right upper quadrant of the abdomen including pancreas, liver, gallbladder, common bile duct was performed.    COMPARISON:  CT abdomen pelvis 02/22/2023    FINDINGS:  Pancreas: The visualized portions of pancreas appear normal.    Liver: Enlarged measuring 20.5 cm. Homogeneous echotexture. No focal hepatic lesions.    Gallbladder: No evidence of cholelithiasis.  There is diffuse gallbladder wall thickening which may be related to underlying liver dysfunction.  Free fluid noted adjacent to the gallbladder.  Sonographic Davidson sign is reportedly negative.  No gallbladder wall hypervascularity.    Biliary system: The common duct is not dilated, measuring 1 mm.  No intrahepatic ductal dilatation.    Spleen: Normal in size with a homogeneous echotexture, measuring 10.7 x 5.1 cm.    Miscellaneous: Moderate volume ascites.  Limited evaluation of the right kidney demonstrates echogenic parenchyma and atrophy suggestive of medical renal disease.  No hydronephrosis.                                       X-Ray Abdomen AP 1 View (Final result)  Result time 03/04/23 21:16:27      Final result by Aguilar Garcia MD (03/04/23 21:16:27)                   Impression:      Nonobstructive bowel gas pattern.  Additional findings discussed in the body of the report.      Electronically signed by: Aguilar Garcia MD  Date:    03/04/2023  Time:    21:16               Narrative:    EXAMINATION:  XR ABDOMEN AP 1 VIEW    CLINICAL HISTORY:  pt reports constipation and emesis  x2 while trying to have BM;    TECHNIQUE:  Single AP View of the abdomen was performed.    COMPARISON:  Chest radiograph, 03/04/2023.  CT, 02/22/2023.    FINDINGS:  Partially visualized cardiac silhouette is enlarged and there are pulmonary opacities and pleural effusions in the lung bases, similar to recent chest radiograph.  Bowel gas pattern is overall nonobstructive.  No large volume stool burden identified in the colon.  Centralization of small bowel could be related to ascites.  Mild diffuse sclerotic appearance of the bones.  Diffuse body wall edema.                                       X-Ray Chest AP Portable (Final result)  Result time 03/04/23 16:33:05      Final result by Wade Heck MD (03/04/23 16:33:05)                   Impression:      Unchanged bilateral ground-glass airspace, suggestive of diffuse pulmonary edema in the setting of CHF.    Slight increase in right-sided pleural effusion.      Electronically signed by: Wade Heck MD  Date:    03/04/2023  Time:    16:33               Narrative:    EXAMINATION:  XR CHEST AP PORTABLE    CLINICAL HISTORY:  Shortness of breath;    TECHNIQUE:  Single frontal view of the chest was performed.    COMPARISON:  02/20/2023.    FINDINGS:  Monitoring EKG leads are present.  There are postoperative changes in the left upper extremity.    The trachea is unremarkable.  There is stable enlargement of the cardiomediastinal silhouette.  There is no evidence of free air beneath the hemidiaphragms.  There is increase in the right-sided pleural effusion.  There is no pleural effusion on the left.  There is no evidence of a pneumothorax.  There is no evidence of pneumomediastinum.  There is unchanged appearance of diffuse bilateral ground-glass airspace opacities.  The osseous structures are unremarkable.                                       Medications   hydrALAZINE injection 20 mg (20 mg Intravenous Given 3/4/23 7330)   insulin regular injection 10 Units 0.1 mL (10  Units Intravenous Given 3/4/23 1810)   labetalol 20 mg/4 mL (5 mg/mL) IV syring (20 mg Intravenous Given 3/4/23 1816)   furosemide injection 120 mg (120 mg Intravenous Given 3/4/23 2049)   potassium chloride SA CR tablet 40 mEq (40 mEq Oral Given 3/5/23 0053)   potassium, sodium phosphates 280-160-250 mg packet 1 packet (1 packet Oral Given 3/5/23 0053)   magnesium oxide tablet 400 mg (400 mg Oral Given 3/5/23 0053)   magnesium oxide tablet 400 mg (400 mg Oral Given 3/5/23 0300)   potassium, sodium phosphates 280-160-250 mg packet 1 packet (1 packet Oral Given 3/5/23 0300)   potassium, sodium phosphates 280-160-250 mg packet 1 packet (1 packet Oral Given 3/6/23 0517)   magnesium oxide tablet 400 mg (400 mg Oral Given 3/6/23 0530)   sodium zirconium cyclosilicate packet 5 g (5 g Oral Given 3/7/23 1152)                 ED Course as of 03/10/23 1142   Sat Mar 04, 2023   1608 Beta-Hydroxybutyrate: 0.2  Normal. [LP]   1608 Hemoglobin(!): 11.1  Decreased from 13.19 days ago. [LP]   1608 Platelets: 171  Normal. [LP]   1706 BNP(!): 4,520  Chronic elevation.  Consistent with history of ESRD.  Patient also has cardiac issues.    Last system echocardiogram performed 11/23/2022.  Normal systolic function.  EF 63%.  Grade 2 left ventricular diastolic dysfunction.  Severe left atrial enlargement.  Mild right ventricular enlargement with low normal right ventricular systolic function.  Right ventricular hypertrophy.  Moderate right atrial enlargement.  Mild-to-moderate tricuspid regurgitation.  Mild pulmonic regurgitation.  Elevated CVP (15 mm Hg).  Moderate to severe pulmonary hypertension with PA systolic pressure of 67 mm Hg. [LP]   1707 Troponin I(!): 0.028  Chronic elevation related to ESRD.  No complaint of chest pain.  ACS highly unlikely. [LP]   1739 Glucose(!!): 691  Profound hyperglycemia.  Will administer IV insulin and reassess.  Holding IV fluids due to ESRD. [LP]   1741 Anion Gap: 14  Normal. [LP]   1741 CO2(!):  19  Mild serum bicarbonate depression secondary to hyperglycemia and ESRD. [LP]   1846 Called and discussed patient's presentation, exam, workup, and treatment with the on-call nephrology fellow who will arrange inpatient hemodialysis. [LP]   1943 Called and discussed the patient's presentation, exam, and workup with the on-call medicine critical care resident who will evaluate. [LP]      ED Course User Index  [LP] Jairon Ward III, MD                 Clinical Impression:   Final diagnoses:  [R06.02] Shortness of breath  [E11.65, Z79.4] Type 2 diabetes mellitus with hyperglycemia, with long-term current use of insulin (Primary)  [E87.70] Hypervolemia, unspecified hypervolemia type        ED Disposition Condition    Admit Stable                Jairon Ward III, MD  03/10/23 9301

## 2023-03-05 NOTE — ASSESSMENT & PLAN NOTE
M/W/F schedule. Pt states he went to dialysis yesterday 3/3/23, and is unsure how much fluid was removed. BNP 4,520 on presentation.    · Nephrology consulted for emergent dialysis  · Lasix 120 mg IV push

## 2023-03-05 NOTE — ASSESSMENT & PLAN NOTE
Glucose 691, Beta Hydroxybutyrate 0.2, anion gap 14, CO2 19. Not in DKA. Given 10 units of regular insulin IV in the ED. Home regimen Aspart 5 units TID with meals , Detemir 20 units in am, 15 units qhs.     · Insulin gtt per protocol  · Hypoglycemia protocol  · POCT glucose q1hr

## 2023-03-05 NOTE — HPI
Cosme Lewis is a 57 y.o. with CKD on HD MWF, DM1, Renovascular HTN, Chronic respiratory failure, HFpEF, and Mild malnutrition who was BIBA to [facility] ED on 3/4/23 because of high BG noted at his NH ([facility]).  EMS got a POCT of >500 mg/dL.  They also noted him to be hypertensive and in respiratory distress on his usual 3L NC; SpO2 was 87%.  EMS placed him on 6 L and SpO2 improved to 90%.  He was immediately placed on BiPAP on arrival to [facility].  He was also given IV pushes of labetalol and hydralazine, to no avail.  He complained to ED staff of cough with grossly bloody sputum for three days and increased shortness of breath from his baseline.  MICU was consulted for admission given his BiPAP need and hypertensive urgency/emergency.        On our arrival he remained tachypneic and hypertensive to >200/110, and appeared tired and uncomfortable.  He says he was last dialyzed on 3/3 but is unsure of how much fluid was removed.  He denies abdominal pain but abdomen appeared distended.  He reports he vomited twice over the last three days while attempting to have a bowel movement, but also says he has had normal bowel movements in the meantime.  He denied recent cough on our questioning, and further denies fever, chills, headache, dizziness, and chest pain.       Admitted to MICU for further evaluation and treatment.    Interval History:  Got dialyzed overnight, now on 3 L NC (baseline). Temporarily on a cardene gtt, which has been off since midnight and home anti-hypertensives have been resumed. Off insulin gtt, now on subcutaneous. Really just needs closer monitoring for BG and adjustments to home insulin regimen. If better controlled this afternoon, may just discharge from ICU.    He will go back to [facility].

## 2023-03-05 NOTE — SUBJECTIVE & OBJECTIVE
Interval History: see above    Review of Systems  Objective:     Vital Signs (Most Recent):  Temp: 97.2 °F (36.2 °C) (03/05/23 1100)  Pulse: 70 (03/05/23 1146)  Resp: 18 (03/05/23 1146)  BP: (!) 173/90 (03/05/23 1100)  SpO2: 96 % (03/05/23 1146)   Vital Signs (24h Range):  Temp:  [97 °F (36.1 °C)-98.6 °F (37 °C)] 97.2 °F (36.2 °C)  Pulse:  [62-80] 70  Resp:  [10-28] 18  SpO2:  [90 %-100 %] 96 %  BP: (156-232)/() 173/90     Weight: 67.9 kg (149 lb 11.1 oz)  Body mass index is 22.11 kg/m².    Intake/Output Summary (Last 24 hours) at 3/5/2023 1343  Last data filed at 3/5/2023 0400  Gross per 24 hour   Intake 48.08 ml   Output 2500 ml   Net -2451.92 ml      Physical Exam    Significant Labs: All pertinent labs within the past 24 hours have been reviewed.  CBC:   Recent Labs   Lab 03/04/23  1545 03/04/23  1707 03/05/23  0155   WBC 4.00  --  4.06   HGB 11.1*  --  11.2*   HCT 35.6* 36 35.2*     --  161     CMP:   Recent Labs   Lab 03/04/23  1659 03/04/23  2325 03/05/23  0155   * 134* 135*   K 3.8 3.2* 3.5   CL 97 99 102   CO2 19* 26 25   * 390* 184*   BUN 29* 23* 12   CREATININE 3.3* 2.8* 1.6*   CALCIUM 7.7* 7.9* 8.2*   PROT 6.1  --  6.3   ALBUMIN 2.9*  --  2.9*   BILITOT 0.8  --  0.8   ALKPHOS 305*  --  283*   *  --  63*   ALT 73*  --  62*   ANIONGAP 14 9 8       Significant Imaging: I have reviewed all pertinent imaging results/findings within the past 24 hours.

## 2023-03-05 NOTE — ASSESSMENT & PLAN NOTE
Elevated /110 Home medications: Clonidine patch 0.3mg, Coreg 6.25 mg BID, hydralazine 100mg q8hrs, Imdur 30 mg BID, Nifedipine 90 daily    · Cardene gtt  · Restart home blood pressure medications

## 2023-03-05 NOTE — PLAN OF CARE
CM sent the pts H&P, 3 day packet, nursing home orders and AVS [facility] to review for admit today. CM following.    03/05/23 5606   Post-Acute Status   Post-Acute Authorization Placement   Post-Acute Placement Status Referrals Sent

## 2023-03-05 NOTE — SUBJECTIVE & OBJECTIVE
Past Medical History:   Diagnosis Date    Chronic kidney disease-mineral and bone disorder 10/12/2022    COPD (chronic obstructive pulmonary disease)     Diabetes mellitus type 1     ESRD on dialysis     Hypertension     SOB (shortness of breath) 10/12/2022    Patient with history COPD treated with Ellipta the albuterol, fluticasone-salmeterol tachypneic in the ED saturating 94% and 6 L on nasal cannula, patient does not know how many  he uses it is in nursing home.    -duo nebs Q 4  Given that patient is a poor historian, and in the setting of left lower extremity edema and history of coagulopathy PE cannot be ruled out.  Will workup for possible infec       Past Surgical History:   Procedure Laterality Date    AV FISTULA PLACEMENT         Review of patient's allergies indicates:  No Known Allergies  Current Facility-Administered Medications   Medication Frequency    acetaminophen tablet 650 mg Q4H PRN    albuterol inhaler 2 puff Q6H PRN    apixaban tablet 5 mg BID    aspirin EC tablet 81 mg Daily    atorvastatin tablet 40 mg Daily    carvediloL tablet 6.25 mg BID    cetirizine tablet 10 mg Daily PRN    cloNIDine 0.3 mg/24 hr td ptwk 1 patch Every Sat    dextrose 10% bolus 125 mL 125 mL PRN    dextrose 10% bolus 250 mL 250 mL PRN    erythromycin 5 mg/gram (0.5 %) ophthalmic ointment Q8H    FLUoxetine capsule 40 mg Daily    fluticasone furoate-vilanteroL 100-25 mcg/dose diskus inhaler 1 puff Daily    hydrALAZINE tablet 100 mg Q8H    insulin aspart U-100 pen 5 Units TIDWM    insulin detemir U-100 pen 15 Units BID    isosorbide mononitrate 24 hr tablet 30 mg BID    melatonin tablet 9 mg Nightly PRN    mupirocin 2 % ointment BID    NIFEdipine 24 hr tablet 90 mg Daily    sodium chloride 0.65 % nasal spray 2 spray Q4H PRN    sodium chloride 0.9% flush 10 mL PRN    tiotropium bromide 2.5 mcg/actuation inhaler 2 puff Daily    vitamin renal formula (B-complex-vitamin c-folic acid) 1 mg per capsule 1 capsule Daily    white  petrolatum 41 % ointment PRN     Family History       Problem Relation (Age of Onset)    Diabetes Mother          Tobacco Use    Smoking status: Never    Smokeless tobacco: Never   Substance and Sexual Activity    Alcohol use: Not Currently    Drug use: Not on file    Sexual activity: Not Currently     Review of Systems   Constitutional:  Negative for chills and fever.   HENT:  Negative for rhinorrhea and sore throat.    Eyes:  Negative for pain and visual disturbance.   Respiratory:  Negative for cough and shortness of breath.    Cardiovascular:  Negative for chest pain and palpitations.   Gastrointestinal:  Negative for abdominal pain, constipation, diarrhea and nausea.   Endocrine: Negative for cold intolerance, heat intolerance and polyuria.   Genitourinary:  Negative for dysuria and flank pain.   Musculoskeletal:  Negative for back pain, gait problem and joint swelling.   Allergic/Immunologic: Negative for immunocompromised state.   Neurological:  Negative for light-headedness, numbness and headaches.   Objective:     Vital Signs (Most Recent):  Temp: 97 °F (36.1 °C) (03/05/23 0300)  Pulse: 71 (03/05/23 0900)  Resp: (!) 21 (03/05/23 0900)  BP: (!) 166/82 (03/05/23 0900)  SpO2: (!) 90 % (03/05/23 0900)   Vital Signs (24h Range):  Temp:  [97 °F (36.1 °C)-98.6 °F (37 °C)] 97 °F (36.1 °C)  Pulse:  [62-80] 71  Resp:  [10-28] 21  SpO2:  [90 %-100 %] 90 %  BP: (156-232)/() 166/82     Weight: 67.9 kg (149 lb 11.1 oz) (03/04/23 1437)  Body mass index is 22.11 kg/m².  Body surface area is 1.82 meters squared.    I/O last 3 completed shifts:  In: 48.1 [I.V.:48.1]  Out: 2500 [Other:2500]    Physical Exam  Constitutional:       General: He is not in acute distress.     Appearance: Normal appearance. He is not ill-appearing or toxic-appearing.   HENT:      Head: Normocephalic and atraumatic.      Nose: Nose normal.      Comments: 3 liter NC     Mouth/Throat:      Mouth: Mucous membranes are moist.   Eyes:      General:  No scleral icterus.        Right eye: No discharge.         Left eye: No discharge.      Pupils: Pupils are equal, round, and reactive to light.   Cardiovascular:      Rate and Rhythm: Normal rate.      Heart sounds: Murmur heard.      Comments: Left AVF  Pulmonary:      Effort: Pulmonary effort is normal. No respiratory distress.      Breath sounds: No wheezing.   Abdominal:      General: Abdomen is flat.   Musculoskeletal:         General: No swelling. Normal range of motion.      Right lower leg: No edema.      Left lower leg: No edema.   Skin:     General: Skin is warm.      Capillary Refill: Capillary refill takes less than 2 seconds.      Coloration: Skin is not jaundiced.      Findings: No bruising or lesion.   Neurological:      General: No focal deficit present.      Mental Status: He is alert.       Significant Labs:  All labs within the past 24 hours have been reviewed.    Significant Imaging:  Labs: Reviewed

## 2023-03-05 NOTE — HPI
Cosme Lewis is a 57 y.o. with CKD on HD MWF, DM1, Renovascular HTN, Chronic respiratory failure, HFpEF, and Mild malnutrition who was BIBA to [facility] ED on 3/4/23 because of high BG noted at his NH ([facility]).  EMS got a POCT of >500 mg/dL.  They also noted him to be hypertensive and in respiratory distress on his usual 3L NC; SpO2 was 87%.  EMS placed him on 6 L and SpO2 improved to 90%.  He was immediately placed on BiPAP on [place] [facility]  He was also given IV pushes of labetalol and hydralazine, to no avail.  He complained to ED staff of cough with grossly bloody sputum for three days and increased shortness of breath from his baseline.  MICU was consulted for admission given his BiPAP need and hypertensive urgency/emergency.       On our arrival he remained tachypneic and hypertensive to >200/110, and appeared tired and uncomfortable.  He says he was last dialyzed on 3/3 but is unsure of how much fluid was removed.  He denies abdominal pain but abdomen appeared distended.  He reports he vomited twice over the last three days while attempting to have a bowel movement, but also says he has had normal bowel movements in the meantime.  He denied recent cough on our questioning, and further denies fever, chills, headache, dizziness, and chest pain.      Admitted to MICU for further evaluation and treatment.

## 2023-03-05 NOTE — ASSESSMENT & PLAN NOTE
Hx of pulmonary thromboembolism within a lobar branch to the right lower lobe noting additional pulmonary thromboembolism within segmental branches to the right upper lobe in 10/2022    Continue home Eliquis.

## 2023-03-05 NOTE — ASSESSMENT & PLAN NOTE
Patient with Hypoxic Respiratory failure which is Acute on chronic.  he is on home oxygen at 3 LPM. Supplemental oxygen was provided and noted- Oxygen Concentration (%):  [40] 40.   Signs/symptoms of respiratory failure include- tachypnea, increased work of breathing and respiratory distress. Contributing diagnoses includes - Fluid overload Labs and images were reviewed. Patient Has recent ABG, which has been reviewed. Will treat underlying causes and adjust management of respiratory failure as follows-     CXR Unchanged bilateral ground-glass airspace, suggestive of diffuse pulmonary edema in the setting of HFpEF and ESRD on HD.     · Continue BiPAP for fluid overload and hypercapnia  · Nephrology consulted for urgent HD  · Lasix 120 mg IV x 1, will extend as needed  · Wean O2 as tolerated  · ABG/VBG prn  · DuoNebs q4hrs  · Continue home Breo inhaler

## 2023-03-05 NOTE — ED NOTES
Pt care assumed. Report received by ADELITA Burrell. Pt lying in stretcher in low and locked position and side rails raised x2. Call light, pt's belongings, and bedside table within pt's reach. Pt on continuous cardiac monitoring, pulse oximetry, and BP cycling every 30 minutes. Pt in NAD and verbalized no needs at this time.

## 2023-03-05 NOTE — PLAN OF CARE
NURSING HOME ORDERS    03/05/2023  [facility]  [facility] - CARDIAC MEDICAL ICU  [address]  [facility] [id number]  Dept: [phone]  Loc: [phone]     Admit to Nursing Home:      Diagnoses:  Active Hospital Problems    Diagnosis  POA    Hypertensive emergency [I16.1]  Yes    Hyponatremia [E87.1]  Yes    Elevated liver enzymes [R74.8]  Yes    Hypervolemia [E87.70]  Yes    Hyperglycemia [R73.9]  Yes    Anemia in ESRD (end-stage renal disease) [N18.6, D63.1]  Yes    Multiple subsegmental pulmonary emboli without acute cor pulmonale [I26.94]  Yes    Acute on chronic respiratory failure with hypoxia [J96.21]  Yes    Chronic diastolic heart failure [I50.32]  Unknown    Chronic kidney disease-mineral and bone disorder [N18.9, E83.9, M89.9]  Yes    ESRD on dialysis [N18.6, Z99.2]  Not Applicable      Resolved Hospital Problems   No resolved problems to display.       Patient is homebound due to:  <principal problem not specified>    Allergies:Review of patient's allergies indicates:  No Known Allergies    Vitals:  Routine    Diet: cardiac diet, diabetic diet: 2000 calorie, and 2 gram sodium diet    Activities:   Activity as tolerated    Goals of Care Treatment Preferences:  Code Status: Full Code      Labs:  per facility protocol    Nursing Precautions:  Aspiration  and Pressure ulcer prevention    Consults:   Nutrition to evaluate and recommend diet            Diabetes Care:  SN to perform and educate Diabetic management with blood glucose monitoring: and Report CBG < 60 or > 350 to physician.      Medications: Discontinue all previous medication orders, if any. See new list below.     Medication List        CONTINUE taking these medications      acetaminophen 650 MG Tbsr  Commonly known as: TYLENOL  Take 650 mg by mouth every 8 (eight) hours as needed (pain or fever over 100.4).     albuterol 90 mcg/actuation inhaler  Commonly known as: PROVENTIL/VENTOLIN HFA  Inhale 2 puffs into the  lungs every 6 (six) hours as needed for Wheezing or Shortness of Breath.     albuterol-ipratropium 2.5 mg-0.5 mg/3 mL nebulizer solution  Commonly known as: DUO-NEB  Take 3 mLs by nebulization every 4 (four) hours while awake. Rescue     apixaban 5 mg Tab  Commonly known as: ELIQUIS  Take 5 mg by mouth 2 (two) times daily.     aspirin 81 MG EC tablet  Commonly known as: ECOTRIN  Take 81 mg by mouth once daily.     atorvastatin 40 MG tablet  Commonly known as: LIPITOR  Take 1 tablet (40 mg total) by mouth once daily.     carvediloL 3.125 MG tablet  Commonly known as: COREG  Take 2 tablets (6.25 mg total) by mouth 2 (two) times daily.     cetirizine 10 MG tablet  Commonly known as: ZYRTEC  Take 10 mg by mouth daily as needed (itching).     cloNIDine 0.3 mg/24 hr td ptwk 0.3 mg/24 hr  Commonly known as: CATAPRES  Place 1 patch onto the skin every Saturday.     erythromycin ophthalmic ointment  Commonly known as: ROMYCIN  Place a 1/2 inch ribbon of ointment into the lower eyelid three times a day.     FLUoxetine 40 MG capsule  Take 40 mg by mouth once daily.     fluticasone-salmeterol 250-50 mcg/dose 250-50 mcg/dose diskus inhaler  Commonly known as: ADVAIR  Inhale 1 puff into the lungs 2 (two) times daily.     GLUCAGON EMERGENCY KIT (HUMAN) 1 mg Solr  Generic drug: glucagon  1 mg into the muscle as needed if CBG < 70     hydrALAZINE 100 MG tablet  Commonly known as: APRESOLINE  Take 1 tablet (100 mg total) by mouth every 8 (eight) hours.     HYDROPHILIC CREAM TOP  Apply liberal amount to affected area twice daily for dry skin     insulin aspart U-100 100 unit/mL (3 mL) Inpn pen  Commonly known as: NovoLOG  Inject 5 Units into the skin 3 (three) times daily with meals. (Plus sliding scale)     insulin detemir U-100 100 unit/mL (3 mL) Inpn pen  Commonly known as: Levemir FLEXTOUCH  Inject 20 Units into the skin every morning and 15 units in the evening.     isosorbide mononitrate 30 MG 24 hr tablet  Commonly known as:  IMDUR  Take 30 mg by mouth 2 (two) times daily.     melatonin 3 mg Tbdl  Take 9 mg by mouth nightly as needed (sleep).     NEPRO CARB STEADY ORAL  Give 1 carton by mouth with each meal.     NIFEdipine 90 MG (OSM) 24 hr tablet  Commonly known as: PROCARDIA-XL  Take 1 tablet (90 mg total) by mouth once daily.     sodium chloride 0.65 % nasal spray  Commonly known as: OCEAN  2 sprays by Nasal route every 4 (four) hours as needed for Congestion.     tiotropium bromide 2.5 mcg/actuation inhaler  Commonly known as: SPIRIVA RESPIMAT  Inhale 2 puffs into the lungs Daily.     triamcinolone acetonide 0.025 % Lotn  Apply topically. Apply to the affected area twice daily     vitamin renal formula (B-complex-vitamin c-folic acid) 1 mg Cap  Commonly known as: NEPHROCAP  Take 1 capsule by mouth once daily.                Immunizations Administered as of 3/5/2023       Name Date Dose VIS Date Route Exp Date    COVID-19, MRNA, LN-S, PF (Moderna) 1/27/2022 -- -- -- --    COVID-19, MRNA, LN-S, PF (Pfizer) (Purple Cap) 3/2/2021 -- -- -- --    : Pf[employer] Inc     Lot: [id number]     COVID-19, MRNA, LN-S, PF (Pfizer) (Purple Cap) 2/9/2021 -- -- -- --    : Pf[employer] Inc     Lot: [id number]             This patient has had both covid vaccinations    Some patients may experience side effects after vaccination.  These may include fever, headache, muscle or joint aches.  Most symptoms resolve with 24-48 hours and do not require urgent medical evaluation unless they persist for more than 72 hours or symptoms are concerning for an unrelated medical condition.          _________________________________  Hien Wright MD  03/05/2023

## 2023-03-05 NOTE — PROGRESS NOTES
[facility] - Cardiac Medical ICU  [facility] Transfer Acceptance  Medicine  Progress Note    Patient Name: Cosme Lewis  MRN: [id number]  Patient Class: IP- Inpatient   Admission Date: 3/4/2023  Length of Stay: 1 days  Attending Physician: Jud Arambula MD  Primary Care Provider: Primary Doctor No        Subjective:     Principal Problem:Acute on chronic respiratory failure with hypoxia        HPI:  Cosme Lewis is a 57 y.o. with CKD on HD MWF, DM1, Renovascular HTN, Chronic respiratory failure, HFpEF, and Mild malnutrition who was BIBA to [facility] ED on 3/4/23 because of high BG noted at his NH ([facility]).  EMS got a POCT of >500 mg/dL.  They also noted him to be hypertensive and in respiratory distress on his usual 3L NC; SpO2 was 87%.  EMS placed him on 6 L and SpO2 improved to 90%.  He was immediately placed on BiPAP on arrival to [facility].  He was also given IV pushes of labetalol and hydralazine, to no avail.  He complained to ED staff of cough with grossly bloody sputum for three days and increased shortness of breath from his baseline.  MICU was consulted for admission given his BiPAP need and hypertensive urgency/emergency.        On our arrival he remained tachypneic and hypertensive to >200/110, and appeared tired and uncomfortable.  He says he was last dialyzed on 3/3 but is unsure of how much fluid was removed.  He denies abdominal pain but abdomen appeared distended.  He reports he vomited twice over the last three days while attempting to have a bowel movement, but also says he has had normal bowel movements in the meantime.  He denied recent cough on our questioning, and further denies fever, chills, headache, dizziness, and chest pain.       Admitted to MICU for further evaluation and treatment.    Interval History:  Got dialyzed overnight, now on 3 L NC (baseline). Temporarily on a cardene gtt, which has been off since midnight and home anti-hypertensives have been resumed. Off insulin gtt, now on subcutaneous. Really  just needs closer monitoring for BG and adjustments to home insulin regimen. If better controlled this afternoon, may just discharge from ICU.    He will go back to [facility].      Overview/Hospital Course:  No notes on file    Interval History: see above    Review of Systems  Objective:     Vital Signs (Most Recent):  Temp: 97.2 °F (36.2 °C) (03/05/23 1100)  Pulse: 70 (03/05/23 1146)  Resp: 18 (03/05/23 1146)  BP: (!) 173/90 (03/05/23 1100)  SpO2: 96 % (03/05/23 1146)   Vital Signs (24h Range):  Temp:  [97 °F (36.1 °C)-98.6 °F (37 °C)] 97.2 °F (36.2 °C)  Pulse:  [62-80] 70  Resp:  [10-28] 18  SpO2:  [90 %-100 %] 96 %  BP: (156-232)/() 173/90     Weight: 67.9 kg (149 lb 11.1 oz)  Body mass index is 22.11 kg/m².    Intake/Output Summary (Last 24 hours) at 3/5/2023 1343  Last data filed at 3/5/2023 0400  Gross per 24 hour   Intake 48.08 ml   Output 2500 ml   Net -2451.92 ml      Physical Exam    Significant Labs: All pertinent labs within the past 24 hours have been reviewed.  CBC:   Recent Labs   Lab 03/04/23  1545 03/04/23  1707 03/05/23  0155   WBC 4.00  --  4.06   HGB 11.1*  --  11.2*   HCT 35.6* 36 35.2*     --  161     CMP:   Recent Labs   Lab 03/04/23  1659 03/04/23  2325 03/05/23  0155   * 134* 135*   K 3.8 3.2* 3.5   CL 97 99 102   CO2 19* 26 25   * 390* 184*   BUN 29* 23* 12   CREATININE 3.3* 2.8* 1.6*   CALCIUM 7.7* 7.9* 8.2*   PROT 6.1  --  6.3   ALBUMIN 2.9*  --  2.9*   BILITOT 0.8  --  0.8   ALKPHOS 305*  --  283*   *  --  63*   ALT 73*  --  62*   ANIONGAP 14 9 8       Significant Imaging: I have reviewed all pertinent imaging results/findings within the past 24 hours.      Assessment/Plan:      * Acute on chronic respiratory failure with hypoxia  Patient with Hypoxic Respiratory failure which is Acute on chronic.  he is on home oxygen at 3 LPM. Supplemental oxygen was provided and noted- Oxygen Concentration (%):  [40] 40.   Signs/symptoms of respiratory failure  include- tachypnea, increased work of breathing and respiratory distress. Contributing diagnoses includes - Fluid overload Labs and images were reviewed. Patient Has recent ABG, which has been reviewed. Will treat underlying causes and adjust management of respiratory failure as follows-      CXR Unchanged bilateral ground-glass airspace, suggestive of diffuse pulmonary edema in the setting of HFpEF and ESRD on HD.      S/p  BiPAP in ICU, for fluid overload and hypercapnia   Nephrology consulted for urgent HD   Was given Lasix 120 mg IV x 1, will extend as needed   Wean O2 as tolerated   DuoNebs q4hrs   Continue home Breo inhaler    3/5- improved after HD    Hypertensive emergency  Patient has a current diagnosis of Hypertensive emergency with end organ damage evidenced by acute pulmonary edema without heart failure which is uncontrolled.  Latest blood pressure and vitals reviewed-   Temp:  [97 °F (36.1 °C)-98.6 °F (37 °C)]   Pulse:  [62-80]   Resp:  [10-28]   BP: (156-232)/()   SpO2:  [90 %-100 %] .   Patient currently on IV antihypertensives.   Home meds for hypertension were reviewed and noted below.   Hypertension Medications             carvediloL (COREG) 3.125 MG tablet Take 2 tablets (6.25 mg total) by mouth 2 (two) times daily.    cloNIDine 0.3 mg/24 hr td ptwk (CATAPRES) 0.3 mg/24 hr Place 1 patch onto the skin every Saturday.    hydrALAZINE (APRESOLINE) 100 MG tablet Take 1 tablet (100 mg total) by mouth every 8 (eight) hours.    isosorbide mononitrate (IMDUR) 30 MG 24 hr tablet Take 30 mg by mouth 2 (two) times daily.    NIFEdipine (PROCARDIA-XL) 90 MG (OSM) 24 hr tablet Take 1 tablet (90 mg total) by mouth once daily.        Medication adjustment for hospital antihypertensives is as follows- per ICU  Will aim for controlled BP reduction by medications noted above. Monitor and mitigate end organ damage as indicated.  Elevated /110 Home medications: Clonidine patch 0.3mg, Coreg 6.25 mg  BID, hydralazine 100mg q8hrs, Imdur 30 mg BID, Nifedipine 90 daily   Cardene gtt until SBP < 180   Nephrology consulted for HD for volume removal   Restart home blood pressure medications    3/5- BP improving      Hyperglycemia  Glucose 691, Beta Hydroxybutyrate 0.2, anion gap 14, CO2 19. Not in DKA. Given 10 units of regular insulin IV in the ED. Home regimen Aspart 5 units TID with meals , Detemir 20 units in am, 15 units qhs.       S/p Insulin gtt per protocol in ICU   Hypoglycemia protocol   POCT     Recent Labs     03/04/23  2156 03/04/23  2257 03/05/23  0004 03/05/23  0056 03/05/23  0757 03/05/23  1211   POCTGLUCOSE >500* 441* 294* 221* 246* 229*         Multiple subsegmental pulmonary emboli without acute cor pulmonale  Hx of pulmonary thromboembolism within a lobar branch to the right lower lobe noting additional pulmonary thromboembolism within segmental branches to the right upper lobe in 10/2022  Continue home Eliquis.    ESRD on dialysis  M/W/F schedule. Pt states he went to dialysis yesterday 3/3/23, and is unsure how much fluid was removed. BNP 4,520 on presentation.   Nephrology consulted for emergent dialysis   S/p Lasix 120 mg IV push upon admission    3/5- was dialyzed    Chronic diastolic heart failure  Most Recent Echo 11/22/22:   The left ventricle is normal in size with mild concentric hypertrophy and increased density and normal systolic function.   The estimated ejection fraction is 63%.   Grade II left ventricular diastolic dysfunction.   Severe left atrial enlargement.   Mild right ventricular enlargement with low normal right ventricular systolic function.   There is right ventricular hypertrophy.   Moderate right atrial enlargement.   Mild to moderate tricuspid regurgitation.   Mild pulmonic regurgitation.   Elevated central venous pressure (15 mmHg).   The estimated PA systolic pressure is 67 mmHg.   There is moderate-severe pulmonary hypertension.   Trivial  posterolateral pericardial effusion.   There is a moderate left pleural effusion.   There may be some ascites present.     On presentation: Troponin elevated at 0.028, BNP > 4500.      Nephrology consulted for urgent HD volume removal   Resuming home meds   Trend troponin   Lasix 120 mg IV x 1   Will monitor volume status and diurese further as warranted     3/5- with pulmonary edema, improved after HD    Hypervolemia  Due to ESRD and acute diastolic HF, hypertensive emergency      Anemia in ESRD (end-stage renal disease)  Hgb 11.1 on admission.  WCTM with Daily CBC and transfuse for Hgb < 7.    Elevated liver enzymes  Abdomen distended; unclear if he has ascites that is contributing to his current clinical picture.     US Abdomen   Daily liver function panel  3/5- may be hepatic cngestion                   Chronic kidney disease-mineral and bone disorder  See above ESRD      Hyponatremia  Likely due to fluid volume overload   -Daily CMP    VTE Risk Mitigation (From admission, onward)         Ordered     apixaban tablet 5 mg  2 times daily         03/04/23 2017     IP VTE HIGH RISK PATIENT  Once         03/04/23 2010     Place sequential compression device  Until discontinued         03/04/23 2010                Discharge Planning   GERA: 3/5/2023     Code Status: Full Code   Is the patient medically ready for discharge?:     Reason for patient still in hospital (select all that apply): Patient trending condition  Discharge Plan A: Return to nursing home          Dahlia Balbuena MD  Senior Hospitalist  Department of Hospital Medicine  [facility]  [address], [phone]    [facility] - Cardiac Medical ICU     supervision

## 2023-03-05 NOTE — ASSESSMENT & PLAN NOTE
ESRD on HD  Patient unsure of own prescription    iHD done for hypertensive emergency with increased oxygen requirement 3/4    Plan/Recommendation  -resume MWF HD schedule  -avoid gadolinium contrast  -renal diet, low K  -

## 2023-03-05 NOTE — ASSESSMENT & PLAN NOTE
Abdomen distended; unclear if he has ascites that is contributing to his current clinical picture.      · US Abdomen  · Daily liver function panel

## 2023-03-05 NOTE — ASSESSMENT & PLAN NOTE
Abdomen distended; unclear if he has ascites that is contributing to his current clinical picture.     US Abdomen   Daily liver function panel  3/5- may be hepatic cngestion

## 2023-03-05 NOTE — ASSESSMENT & PLAN NOTE
Glucose 691, Beta Hydroxybutyrate 0.2, anion gap 14, CO2 19. Not in DKA. Given 10 units of regular insulin IV in the ED. Home regimen Aspart 5 units TID with meals , Detemir 20 units in am, 15 units qhs.       S/p Insulin gtt per protocol in ICU   Hypoglycemia protocol   POCT     Recent Labs     03/04/23  2156 03/04/23  2257 03/05/23  0004 03/05/23  0056 03/05/23  0757 03/05/23  1211   POCTGLUCOSE >500* 441* 294* 221* 246* 229*

## 2023-03-05 NOTE — ASSESSMENT & PLAN NOTE
M/W/F schedule. Pt states he went to dialysis yesterday 3/3/23, and is unsure how much fluid was removed. BNP 4,520 on presentation.   Nephrology consulted for emergent dialysis   S/p Lasix 120 mg IV push upon admission    3/5- was dialyzed

## 2023-03-05 NOTE — CONSULTS
[facility] - Cardiac Medical ICU  Nephrology  Consult Note    Patient Name: Cosme Lewis  MRN: [id number]  Admission Date: 3/4/2023  Hospital Length of Stay: 1 days  Attending Provider: Jud Arambula MD   Primary Care Physician: Primary Doctor No  Principal Problem:<principal problem not specified>    Inpatient consult to Nephrology  Consult performed by: Otf Mcknight MD  Consult ordered by: Houston Rico MD        Subjective:     HPI: 57 year with ESRD on HD, COPD, HFpEF, T1DM admitted to [facility] for hypertensive emergency in the setting of hyperglycemia to (600+ blood sugar). He was found to have a blood pressure of 230 systolic and his chronic 3 liter O2 requirement had increased to 6 liters. He required bipap overnight, thus was admitted to MICU for treatment.     Outpatient ESRD  Hemodialysis  Outpatient nephrologist: ??  Outpatient center: [facility]?  Dialysis schedule: [facility]  Last treatment: 3/3/23  Anuric Yes  Dry weight: ??  Access: LVF        Past Medical History:   Diagnosis Date    Chronic kidney disease-mineral and bone disorder 10/12/2022    COPD (chronic obstructive pulmonary disease)     Diabetes mellitus type 1     ESRD on dialysis     Hypertension     SOB (shortness of breath) 10/12/2022    Patient with history COPD treated with Ellipta the albuterol, fluticasone-salmeterol tachypneic in the ED saturating 94% and 6 L on nasal cannula, patient does not know how many  he uses it is in nursing home.    -duo nebs Q 4  Given that patient is a poor historian, and in the setting of left lower extremity edema and history of coagulopathy PE cannot be ruled out.  Will workup for possible infec       Past Surgical History:   Procedure Laterality Date    AV FISTULA PLACEMENT         Review of patient's allergies indicates:  No Known Allergies  Current Facility-Administered Medications   Medication Frequency    acetaminophen tablet 650 mg Q4H PRN    albuterol inhaler 2 puff Q6H PRN    apixaban tablet 5 mg BID     aspirin EC tablet 81 mg Daily    atorvastatin tablet 40 mg Daily    carvediloL tablet 6.25 mg BID    cetirizine tablet 10 mg Daily PRN    cloNIDine 0.3 mg/24 hr td ptwk 1 patch Every Sat    dextrose 10% bolus 125 mL 125 mL PRN    dextrose 10% bolus 250 mL 250 mL PRN    erythromycin 5 mg/gram (0.5 %) ophthalmic ointment Q8H    FLUoxetine capsule 40 mg Daily    fluticasone furoate-vilanteroL 100-25 mcg/dose diskus inhaler 1 puff Daily    hydrALAZINE tablet 100 mg Q8H    insulin aspart U-100 pen 5 Units TIDWM    insulin detemir U-100 pen 15 Units BID    isosorbide mononitrate 24 hr tablet 30 mg BID    melatonin tablet 9 mg Nightly PRN    mupirocin 2 % ointment BID    NIFEdipine 24 hr tablet 90 mg Daily    sodium chloride 0.65 % nasal spray 2 spray Q4H PRN    sodium chloride 0.9% flush 10 mL PRN    tiotropium bromide 2.5 mcg/actuation inhaler 2 puff Daily    vitamin renal formula (B-complex-vitamin c-folic acid) 1 mg per capsule 1 capsule Daily    white petrolatum 41 % ointment PRN     Family History       Problem Relation (Age of Onset)    Diabetes Mother          Tobacco Use    Smoking status: Never    Smokeless tobacco: Never   Substance and Sexual Activity    Alcohol use: Not Currently    Drug use: Not on file    Sexual activity: Not Currently     Review of Systems   Constitutional:  Negative for chills and fever.   HENT:  Negative for rhinorrhea and sore throat.    Eyes:  Negative for pain and visual disturbance.   Respiratory:  Negative for cough and shortness of breath.    Cardiovascular:  Negative for chest pain and palpitations.   Gastrointestinal:  Negative for abdominal pain, constipation, diarrhea and nausea.   Endocrine: Negative for cold intolerance, heat intolerance and polyuria.   Genitourinary:  Negative for dysuria and flank pain.   Musculoskeletal:  Negative for back pain, gait problem and joint swelling.   Allergic/Immunologic: Negative for immunocompromised state.    Neurological:  Negative for light-headedness, numbness and headaches.   Objective:     Vital Signs (Most Recent):  Temp: 97 °F (36.1 °C) (03/05/23 0300)  Pulse: 71 (03/05/23 0900)  Resp: (!) 21 (03/05/23 0900)  BP: (!) 166/82 (03/05/23 0900)  SpO2: (!) 90 % (03/05/23 0900)   Vital Signs (24h Range):  Temp:  [97 °F (36.1 °C)-98.6 °F (37 °C)] 97 °F (36.1 °C)  Pulse:  [62-80] 71  Resp:  [10-28] 21  SpO2:  [90 %-100 %] 90 %  BP: (156-232)/() 166/82     Weight: 67.9 kg (149 lb 11.1 oz) (03/04/23 1437)  Body mass index is 22.11 kg/m².  Body surface area is 1.82 meters squared.    I/O last 3 completed shifts:  In: 48.1 [I.V.:48.1]  Out: 2500 [Other:2500]    Physical Exam  Constitutional:       General: He is not in acute distress.     Appearance: Normal appearance. He is not ill-appearing or toxic-appearing.   HENT:      Head: Normocephalic and atraumatic.      Nose: Nose normal.      Comments: 3 liter NC     Mouth/Throat:      Mouth: Mucous membranes are moist.   Eyes:      General: No scleral icterus.        Right eye: No discharge.         Left eye: No discharge.      Pupils: Pupils are equal, round, and reactive to light.   Cardiovascular:      Rate and Rhythm: Normal rate.      Heart sounds: Murmur heard.      Comments: Left AVF  Pulmonary:      Effort: Pulmonary effort is normal. No respiratory distress.      Breath sounds: No wheezing.   Abdominal:      General: Abdomen is flat.   Musculoskeletal:         General: No swelling. Normal range of motion.      Right lower leg: No edema.      Left lower leg: No edema.   Skin:     General: Skin is warm.      Capillary Refill: Capillary refill takes less than 2 seconds.      Coloration: Skin is not jaundiced.      Findings: No bruising or lesion.   Neurological:      General: No focal deficit present.      Mental Status: He is alert.       Significant Labs:  All labs within the past 24 hours have been reviewed.    Significant Imaging:  Labs:  Reviewed    Assessment/Plan:     Pulmonary  Acute on chronic respiratory failure with hypoxia  COPD 3 liters NC at baseline    -per primary    Renal/  Chronic kidney disease-mineral and bone disorder  Calcium   Date Value Ref Range Status   03/05/2023 8.2 (L) 8.7 - 10.5 mg/dL Final     Phosphorus   Date Value Ref Range Status   03/05/2023 1.6 (L) 2.7 - 4.5 mg/dL Final     Obtain PTH    ESRD on dialysis  ESRD on HD  Patient unsure of own prescription    iHD done for hypertensive emergency with increased oxygen requirement 3/4    Plan/Recommendation  -resume MWF HD schedule  -avoid gadolinium contrast  -renal diet, low K  -    Oncology  Anemia in ESRD (end-stage renal disease)  Goal hemoglobin in ESRD is 10-12  Hemoglobin   Date Value Ref Range Status   03/05/2023 11.2 (L) 14.0 - 18.0 g/dL Final     Iron   Date Value Ref Range Status   02/23/2023 39 (L) 45 - 160 ug/dL Final     Transferrin   Date Value Ref Range Status   02/23/2023 137 (L) 200 - 375 mg/dL Final     TIBC   Date Value Ref Range Status   02/23/2023 203 (L) 250 - 450 ug/dL Final     Saturated Iron   Date Value Ref Range Status   02/23/2023 19 (L) 20 - 50 % Final     Ferritin   Date Value Ref Range Status   02/23/2023 1,803 (H) 20.0 - 300.0 ng/mL Final     Hemoglobin at goal          Thank you for your consult. I will follow-up with patient. Please contact us if you have any additional questions.    Otf Mcknight MD  Nephrology  [facility] - Cardiac Medical ICU

## 2023-03-05 NOTE — ASSESSMENT & PLAN NOTE
Patient with Hypoxic Respiratory failure which is Acute on chronic.  he is on home oxygen at 3 LPM. Supplemental oxygen was provided and noted- Oxygen Concentration (%):  [40] 40.   Signs/symptoms of respiratory failure include- tachypnea, increased work of breathing and respiratory distress. Contributing diagnoses includes - Fluid overload Labs and images were reviewed. Patient Has recent ABG, which has been reviewed. Will treat underlying causes and adjust management of respiratory failure as follows-      CXR Unchanged bilateral ground-glass airspace, suggestive of diffuse pulmonary edema in the setting of HFpEF and ESRD on HD.      S/p  BiPAP in ICU, for fluid overload and hypercapnia   Nephrology consulted for urgent HD   Was given Lasix 120 mg IV x 1, will extend as needed   Wean O2 as tolerated   DuoNebs q4hrs   Continue home Breo inhaler    3/5- improved after HD

## 2023-03-05 NOTE — PROGRESS NOTES
03/05/23 0210   Vital Signs   Pulse 64   Heart Rate Source Monitor;Continuous   Resp 19   SpO2 98 %   Pulse Oximetry Type Continuous   BP (!) 163/91   MAP (mmHg) 122   BP Location Right arm   BP Method Automatic   Patient Position Lying     HD tx completed and pt tolerated well.  Net UF of 2L.No distress noted.

## 2023-03-05 NOTE — H&P
[facility] - Cardiac Medical ICU  Critical Care Medicine  History & Physical    Patient Name: Cosme Lewis  MRN: [id number]  Admission Date: 3/4/2023  Hospital Length of Stay: 1 days  Code Status: Full Code  Attending Physician: Jud Arambula MD   Primary Care Provider: Primary Doctor No   Principal Problem: <principal problem not specified>    Subjective:     HPI:  Cosme Lewis is a 57 y.o. with CKD on HD MWF, DM1, Renovascular HTN, Chronic respiratory failure, HFpEF, and Mild malnutrition who was BIBA to [facility] ED on 3/4/23 because of high BG noted at his NH ([facility]).  EMS got a POCT of >500 mg/dL.  They also noted him to be hypertensive and in respiratory distress on his usual 3L NC; SpO2 was 87%.  EMS placed him on 6 L and SpO2 improved to 90%.  He was immediately placed on BiPAP on arrival to [facility].  He was also given IV pushes of labetalol and hydralazine, to no avail.  He complained to ED staff of cough with grossly bloody sputum for three days and increased shortness of breath from his baseline.  MICU was consulted for admission given his BiPAP need and hypertensive urgency/emergency.       On our arrival he remained tachypneic and hypertensive to >200/110, and appeared tired and uncomfortable.  He says he was last dialyzed on 3/3 but is unsure of how much fluid was removed.  He denies abdominal pain but abdomen appeared distended.  He reports he vomited twice over the last three days while attempting to have a bowel movement, but also says he has had normal bowel movements in the meantime.  He denied recent cough on our questioning, and further denies fever, chills, headache, dizziness, and chest pain.      Admitted to MICU for further evaluation and treatment.      Hospital/ICU Course:  No notes on file     No new subjective & objective note has been filed under this hospital service since the last note was generated.    Assessment/Plan:     Pulmonary  Acute on chronic respiratory failure with hypoxia  Patient  with Hypoxic Respiratory failure which is Acute on chronic.  he is on home oxygen at 3 LPM. Supplemental oxygen was provided and noted- Oxygen Concentration (%):  [40] 40.   Signs/symptoms of respiratory failure include- tachypnea, increased work of breathing and respiratory distress. Contributing diagnoses includes - Fluid overload Labs and images were reviewed. Patient Has recent ABG, which has been reviewed. Will treat underlying causes and adjust management of respiratory failure as follows-     CXR Unchanged bilateral ground-glass airspace, suggestive of diffuse pulmonary edema in the setting of HFpEF and ESRD on HD.     · Continue BiPAP for fluid overload and hypercapnia  · Nephrology consulted for urgent HD  · Lasix 120 mg IV x 1, will extend as needed  · Wean O2 as tolerated  · ABG/VBG prn  · DuoNebs q4hrs  · Continue home Breo inhaler       Cardiac/Vascular  Hypertensive emergency  Elevated /110 Home medications: Clonidine patch 0.3mg, Coreg 6.25 mg BID, hydralazine 100mg q8hrs, Imdur 30 mg BID, Nifedipine 90 daily    · Cardene gtt until SBP < 180  · Nephrology consulted for HD for volume removal  · Restart home blood pressure medications    Chronic diastolic heart failure  Most Recent Echo 11/22/22:   The left ventricle is normal in size with mild concentric hypertrophy and increased density and normal systolic function.   The estimated ejection fraction is 63%.   Grade II left ventricular diastolic dysfunction.   Severe left atrial enlargement.   Mild right ventricular enlargement with low normal right ventricular systolic function.   There is right ventricular hypertrophy.   Moderate right atrial enlargement.   Mild to moderate tricuspid regurgitation.   Mild pulmonic regurgitation.   Elevated central venous pressure (15 mmHg).   The estimated PA systolic pressure is 67 mmHg.   There is moderate-severe pulmonary hypertension.   Trivial posterolateral pericardial effusion.   There is a  moderate left pleural effusion.   There may be some ascites present.    On presentation: Troponin elevated at 0.028, BNP > 4500.    · Nephrology consulted for urgent HD volume removal  · Resuming home meds  · Trend troponin  · Lasix 120 mg IV x 1  · Will monitor volume status and diurese further as warranted     Renal/  Hyponatremia  Likely due to fluid volume overload    -Daily CMP    Hypervolemia  -See ESRD    ESRD on dialysis  M/W/F schedule. Pt states he went to dialysis yesterday 3/3/23, and is unsure how much fluid was removed. BNP 4,520 on presentation.    · Nephrology consulted for emergent dialysis  · Lasix 120 mg IV push    Hematology  Multiple subsegmental pulmonary emboli without acute cor pulmonale  Hx of pulmonary thromboembolism within a lobar branch to the right lower lobe noting additional pulmonary thromboembolism within segmental branches to the right upper lobe in 10/2022    Continue home Eliquis.    Oncology  Anemia in ESRD (end-stage renal disease)  Hgb 11.1 on admission.  WCTM with Daily CBC and transfuse for Hgb < 7.    Endocrine  Hyperglycemia  Glucose 691, Beta Hydroxybutyrate 0.2, anion gap 14, CO2 19. Not in DKA. Given 10 units of regular insulin IV in the ED. Home regimen Aspart 5 units TID with meals , Detemir 20 units in am, 15 units qhs.     · Insulin gtt per protocol  · Hypoglycemia protocol  · POCT glucose q1hr    GI  Elevated liver enzymes  Abdomen distended; unclear if he has ascites that is contributing to his current clinical picture.      · US Abdomen  · Daily liver function panel      Critical secondary to Patient has a condition that poses threat to life and bodily function: Severe Respiratory Distress and Acute Renal Failure     Critical care was time spent personally by me on the following activities: development of treatment plan with patient or surrogate and bedside caregivers, discussions with consultants, evaluation of patient's response to treatment, examination of  patient, ordering and performing treatments and interventions, ordering and review of laboratory studies, ordering and review of radiographic studies, pulse oximetry, re-evaluation of patient's condition. This critical care time did not overlap with that of any other provider or involve time for any procedures.     Houston Rico MD  Critical Care Medicine  [facility] - Cardiac Medical [facility]

## 2023-03-05 NOTE — CONSULTS
Pt seen and examined. Will be admitted to MICU. Full H&P to follow.     [facility]-  Critical Care Medicine  03/04/2023 8:00 PM

## 2023-03-05 NOTE — ASSESSMENT & PLAN NOTE
Most Recent Echo 11/22/22:   The left ventricle is normal in size with mild concentric hypertrophy and increased density and normal systolic function.   The estimated ejection fraction is 63%.   Grade II left ventricular diastolic dysfunction.   Severe left atrial enlargement.   Mild right ventricular enlargement with low normal right ventricular systolic function.   There is right ventricular hypertrophy.   Moderate right atrial enlargement.   Mild to moderate tricuspid regurgitation.   Mild pulmonic regurgitation.   Elevated central venous pressure (15 mmHg).   The estimated PA systolic pressure is 67 mmHg.   There is moderate-severe pulmonary hypertension.   Trivial posterolateral pericardial effusion.   There is a moderate left pleural effusion.   There may be some ascites present.     On presentation: Troponin elevated at 0.028, BNP > 4500.      Nephrology consulted for urgent HD volume removal   Resuming home meds   Trend troponin   Lasix 120 mg IV x 1   Will monitor volume status and diurese further as warranted     3/5- with pulmonary edema, improved after HD

## 2023-03-05 NOTE — HOSPITAL COURSE
57 year old male admitted to the MICU with acute hypoxemic respiratory failure 2/2 volume overload, hypertensive emergency and hyperglycemia. He was initially placed on BiPAP but after one session of HD, he was able to be weaned to nasal canula at 3 L, which is baseline. He was temporarily placed on a cardene gtt which was turned off and resumption of home anti-hypertensives resumed. Insulin gtt transitioned to subcutaneous insulin with improvement in blood glucose. Due to hypoglycemia, his insulin regimen is being adjusted which will delay discharge.

## 2023-03-05 NOTE — DISCHARGE SUMMARY
[facility] - Cardiac Medical ICU  Critical Care Medicine  Discharge Summary      Patient Name: Cosme Lewis  MRN: [id number]  Admission Date: 3/4/2023  Hospital Length of Stay: 1 days  Discharge Date and Time:  03/05/2023 2:53 PM  Attending Physician: Jud Arambula MD   Discharging Provider: Hien Wright MD  Primary Care Provider: Primary Doctor No  Reason for Admission: Acute on chronic respiratory failure with hypoxia    HPI:   Cosme Lewis is a 57 y.o. with CKD on HD MWF, DM1, Renovascular HTN, Chronic respiratory failure, HFpEF, and Mild malnutrition who was BIBA to [facility] ED on 3/4/23 because of high BG noted at his NH ([facility]).  EMS got a POCT of >500 mg/dL.  They also noted him to be hypertensive and in respiratory distress on his usual 3L NC; SpO2 was 87%.  EMS placed him on 6 L and SpO2 improved to 90%.  He was immediately placed on BiPAP on arrival to [facility].  He was also given IV pushes of labetalol and hydralazine, to no avail.  He complained to ED staff of cough with grossly bloody sputum for three days and increased shortness of breath from his baseline.  MICU was consulted for admission given his BiPAP need and hypertensive urgency/emergency.       On our arrival he remained tachypneic and hypertensive to >200/110, and appeared tired and uncomfortable.  He says he was last dialyzed on 3/3 but is unsure of how much fluid was removed.  He denies abdominal pain but abdomen appeared distended.  He reports he vomited twice over the last three days while attempting to have a bowel movement, but also says he has had normal bowel movements in the meantime.  He denied recent cough on our questioning, and further denies fever, chills, headache, dizziness, and chest pain.      Admitted to MICU for further evaluation and treatment.    * No surgery found *    Indwelling Lines/Drains at Time of Discharge:   Lines/Drains/Airways     Drain  Duration                Hemodialysis AV Fistula Left upper arm -- days          Hemodialysis AV Fistula Left upper arm -- days              Hospital Course:   57 year old male admitted to the MICU with acute hypoxemic respiratory failure 2/2 volume overload, hypertensive emergency and hyperglycemia. He was initially placed on BiPAP but after one session of HD, he was able to be weaned to nasal canula at 3 L, which is baseline. He was temporarily placed on a cardene gtt which was turned off and resumption of home anti-hypertensives resumed. Insulin gtt transitioned to subcutaneous insulin with improvement in blood glucose. He is medically ready for discharge and will be transferred back to [facility]. All questions and concerns were answered.     Consults (From admission, onward)        Status Ordering Provider     IP consult to case management  Once        Provider:  (Not yet assigned)    Completed GILLIAN LOVE     Inpatient consult to Nephrology  Once        Provider:  (Not yet assigned)    Completed FRANCISCO SAM     Inpatient consult to Critical Care Medicine  Once        Provider:  (Not yet assigned)    Completed MELIZA TA III        Significant Labs:  All pertinent labs within the past 24 hours have been reviewed.    Significant Imaging:  I have reviewed all pertinent imaging results/findings within the past 24 hours.    Pending Diagnostic Studies:     None        Final Active Diagnoses:    Diagnosis Date Noted POA    PRINCIPAL PROBLEM:  Acute on chronic respiratory failure with hypoxia [J96.21] 10/12/2022 Yes    Hypertensive emergency [I16.1] 03/04/2023 Yes    Hyponatremia [E87.1] 03/04/2023 Yes    Elevated liver enzymes [R74.8] 03/04/2023 Yes    Hypervolemia [E87.70] 02/22/2023 Yes    Hyperglycemia [R73.9] 01/05/2023 Yes    Anemia in ESRD (end-stage renal disease) [N18.6, D63.1] 11/18/2022 Yes    Multiple subsegmental pulmonary emboli without acute cor pulmonale [I26.94] 10/13/2022 Yes    Chronic diastolic heart failure [I50.32] 10/12/2022 Yes    Chronic kidney  disease-mineral and bone disorder [N18.9, E83.9, M89.9] 10/12/2022 Yes    ESRD on dialysis [N18.6, Z99.2]  Not Applicable      Problems Resolved During this Admission:   Discharged Condition: stable    Disposition: Skilled Nursing Facility     Follow-up Information     [facility] - Request Follow up.    Specialties: Nursing Home Agency, SNF Agency  Why: [facility], Nursing Home  Contact information:  [place] SERV[place] [id number] [phone]                       Patient Instructions:   No discharge procedures on file.  Medications:  Reconciled Home Medications:      Medication List      CONTINUE taking these medications    acetaminophen 650 MG Tbsr  Commonly known as: TYLENOL  Take 650 mg by mouth every 8 (eight) hours as needed (pain or fever over 100.4).     albuterol 90 mcg/actuation inhaler  Commonly known as: PROVENTIL/VENTOLIN HFA  Inhale 2 puffs into the lungs every 6 (six) hours as needed for Wheezing or Shortness of Breath.     albuterol-ipratropium 2.5 mg-0.5 mg/3 mL nebulizer solution  Commonly known as: DUO-NEB  Take 3 mLs by nebulization every 4 (four) hours while awake. Rescue     apixaban 5 mg Tab  Commonly known as: ELIQUIS  Take 5 mg by mouth 2 (two) times daily.     aspirin 81 MG EC tablet  Commonly known as: ECOTRIN  Take 81 mg by mouth once daily.     atorvastatin 40 MG tablet  Commonly known as: LIPITOR  Take 1 tablet (40 mg total) by mouth once daily.     carvediloL 3.125 MG tablet  Commonly known as: COREG  Take 2 tablets (6.25 mg total) by mouth 2 (two) times daily.     cetirizine 10 MG tablet  Commonly known as: ZYRTEC  Take 10 mg by mouth daily as needed (itching).     cloNIDine 0.3 mg/24 hr td ptwk 0.3 mg/24 hr  Commonly known as: CATAPRES  Place 1 patch onto the skin every Saturday.     erythromycin ophthalmic ointment  Commonly known as: ROMYCIN  Place a 1/2 inch ribbon of ointment into the lower eyelid three times a day.     FLUoxetine 40 MG capsule  Take 40 mg by mouth  once daily.     fluticasone-salmeterol 250-50 mcg/dose 250-50 mcg/dose diskus inhaler  Commonly known as: ADVAIR  Inhale 1 puff into the lungs 2 (two) times daily.     GLUCAGON EMERGENCY KIT (HUMAN) 1 mg Solr  Generic drug: glucagon  1 mg into the muscle as needed if CBG < 70     hydrALAZINE 100 MG tablet  Commonly known as: APRESOLINE  Take 1 tablet (100 mg total) by mouth every 8 (eight) hours.     HYDROPHILIC CREAM TOP  Apply liberal amount to affected area twice daily for dry skin     insulin aspart U-100 100 unit/mL (3 mL) Inpn pen  Commonly known as: NovoLOG  Inject 5 Units into the skin 3 (three) times daily with meals. (Plus sliding scale)     insulin detemir U-100 100 unit/mL (3 mL) Inpn pen  Commonly known as: Levemir FLEXTOUCH  Inject 20 Units into the skin every morning and 15 units in the evening.     isosorbide mononitrate 30 MG 24 hr tablet  Commonly known as: IMDUR  Take 30 mg by mouth 2 (two) times daily.     melatonin 3 mg Tbdl  Take 9 mg by mouth nightly as needed (sleep).     NEPRO CARB STEADY ORAL  Give 1 carton by mouth with each meal.     NIFEdipine 90 MG (OSM) 24 hr tablet  Commonly known as: PROCARDIA-XL  Take 1 tablet (90 mg total) by mouth once daily.     sodium chloride 0.65 % nasal spray  Commonly known as: OCEAN  2 sprays by Nasal route every 4 (four) hours as needed for Congestion.     tiotropium bromide 2.5 mcg/actuation inhaler  Commonly known as: SPIRIVA RESPIMAT  Inhale 2 puffs into the lungs Daily.     triamcinolone acetonide 0.025 % Lotn  Apply topically. Apply to the affected area twice daily     vitamin renal formula (B-complex-vitamin c-folic acid) 1 mg Cap  Commonly known as: NEPHROCAP  Take 1 capsule by mouth once daily.             Hien Wright MD  Critical Care Medicine  [facility] Cardiac Medical ICU

## 2023-03-05 NOTE — HPI
57 year with ESRD on HD, COPD, HFpEF, T1DM admitted to [facility] for hypertensive emergency in the setting of hyperglycemia to (600+ blood sugar). He was found to have a blood pressure of 230 systolic and his chronic 3 liter O2 requirement had increased to 6 liters. He required bipap overnight, thus was admitted to MICU for treatment.     Outpatient ESRD  Hemodialysis  Outpatient nephrologist: ??  Outpatient center: reports [facility]?  Dialysis schedule: MWF  Last treatment: 3/3/23  Anuric Yes  Dry weight: ??  Access: LVF

## 2023-03-05 NOTE — PLAN OF CARE
PAWAN spoke to Darlin with [facility] at [phone]- she confirmed that she is the pts nurse and orders can be faxed to her at [phone]. CM faxed discharge orders, Confirmation received.     Pts nurse can call report to Darlin at [phone]. Pts nurse updated via secure chat.    03/05/23 1410   Post-Acute Status   Post-Acute Authorization Placement   Post-Acute Placement Status Set-up Complete/Auth obtained

## 2023-03-05 NOTE — ASSESSMENT & PLAN NOTE
Patient has a current diagnosis of Hypertensive emergency with end organ damage evidenced by acute pulmonary edema without heart failure which is uncontrolled.  Latest blood pressure and vitals reviewed-   Temp:  [97 °F (36.1 °C)-98.6 °F (37 °C)]   Pulse:  [62-80]   Resp:  [10-28]   BP: (156-232)/()   SpO2:  [90 %-100 %] .   Patient currently on IV antihypertensives.   Home meds for hypertension were reviewed and noted below.   Hypertension Medications             carvediloL (COREG) 3.125 MG tablet Take 2 tablets (6.25 mg total) by mouth 2 (two) times daily.    cloNIDine 0.3 mg/24 hr td ptwk (CATAPRES) 0.3 mg/24 hr Place 1 patch onto the skin every Saturday.    hydrALAZINE (APRESOLINE) 100 MG tablet Take 1 tablet (100 mg total) by mouth every 8 (eight) hours.    isosorbide mononitrate (IMDUR) 30 MG 24 hr tablet Take 30 mg by mouth 2 (two) times daily.    NIFEdipine (PROCARDIA-XL) 90 MG (OSM) 24 hr tablet Take 1 tablet (90 mg total) by mouth once daily.        Medication adjustment for hospital antihypertensives is as follows- per ICU  Will aim for controlled BP reduction by medications noted above. Monitor and mitigate end organ damage as indicated.  Elevated /110 Home medications: Clonidine patch 0.3mg, Coreg 6.25 mg BID, hydralazine 100mg q8hrs, Imdur 30 mg BID, Nifedipine 90 daily   Cardene gtt until SBP < 180   Nephrology consulted for HD for volume removal   Restart home blood pressure medications    3/5- BP improving

## 2023-03-05 NOTE — ASSESSMENT & PLAN NOTE
Calcium   Date Value Ref Range Status   03/05/2023 8.2 (L) 8.7 - 10.5 mg/dL Final     Phosphorus   Date Value Ref Range Status   03/05/2023 1.6 (L) 2.7 - 4.5 mg/dL Final     Obtain PTH

## 2023-03-05 NOTE — ASSESSMENT & PLAN NOTE
Goal hemoglobin in ESRD is 10-12  Hemoglobin   Date Value Ref Range Status   03/05/2023 11.2 (L) 14.0 - 18.0 g/dL Final     Iron   Date Value Ref Range Status   02/23/2023 39 (L) 45 - 160 ug/dL Final     Transferrin   Date Value Ref Range Status   02/23/2023 137 (L) 200 - 375 mg/dL Final     TIBC   Date Value Ref Range Status   02/23/2023 203 (L) 250 - 450 ug/dL Final     Saturated Iron   Date Value Ref Range Status   02/23/2023 19 (L) 20 - 50 % Final     Ferritin   Date Value Ref Range Status   02/23/2023 1,803 (H) 20.0 - 300.0 ng/mL Final     Hemoglobin at goal

## 2023-03-05 NOTE — PROGRESS NOTES
03/04/23 6670   Treatment Type   Treatment Type Maintenance   Vital Signs   Temp 97 °F (36.1 °C)   Temp Source Axillary   Pulse 62   Heart Rate Source Monitor   Resp 14   SpO2 98 %   Pulse Oximetry Type Continuous   Oxygen Concentration (%) 40   Device (Oxygen Therapy) BIPAP   BP (!) 198/103   MAP (mmHg) 145   BP Location Right arm   BP Method Automatic   Patient Position Lying   Assessments (Pre/Post)   Consent Obtained yes   Safety vein preservation armband present   Level of Consciousness (AVPU) alert   Pre-Hemodialysis Assessment   Patient Status   (Bedside HD tx)   Treatment Status Started     Pt is awake and alert.  Bedside HD tx initiated.  UF goal of 2L. Pt is hypertensive.

## 2023-03-05 NOTE — ASSESSMENT & PLAN NOTE
Most Recent Echo 11/22/22:   The left ventricle is normal in size with mild concentric hypertrophy and increased density and normal systolic function.   The estimated ejection fraction is 63%.   Grade II left ventricular diastolic dysfunction.   Severe left atrial enlargement.   Mild right ventricular enlargement with low normal right ventricular systolic function.   There is right ventricular hypertrophy.   Moderate right atrial enlargement.   Mild to moderate tricuspid regurgitation.   Mild pulmonic regurgitation.   Elevated central venous pressure (15 mmHg).   The estimated PA systolic pressure is 67 mmHg.   There is moderate-severe pulmonary hypertension.   Trivial posterolateral pericardial effusion.   There is a moderate left pleural effusion.   There may be some ascites present.    On presentation: Troponin elevated at 0.028, BNP > 4500.    · Nephrology consulted for urgent HD volume removal  · Resuming home meds  · Trend troponin  · Lasix 120 mg IV x 1  · Will monitor volume status and diurese further as warranted

## 2023-03-05 NOTE — PLAN OF CARE
Per Haley Lowe RN Nursing supervisor with  [facility] - they are not able to take the pt back today. Per ADELITA Link CM they stated that they are unable to get his medications from pharmacy at this time and he was sent to ICU for high blood sugars which they are not equipped to care for at this time . Pts bedside nurse updated that the nursing home is refusing to accept pt back today. Hien Dan MD updated via secure chat of discharge issues. She asked for supervisor's phone number. PAWAN updated her with the number to Haley Lowe- [phone].     03/05/23 7820   Post-Acute Status   Post-Acute Authorization Placement   Post-Acute Placement Status Discharge Plan Changed

## 2023-03-06 PROBLEM — E87.1 HYPONATREMIA: Status: RESOLVED | Noted: 2023-01-01 | Resolved: 2023-01-01

## 2023-03-06 PROBLEM — E87.70 HYPERVOLEMIA: Status: RESOLVED | Noted: 2023-01-01 | Resolved: 2023-01-01

## 2023-03-06 NOTE — ASSESSMENT & PLAN NOTE
Goal hemoglobin in ESRD is 10-12  Hemoglobin   Date Value Ref Range Status   03/06/2023 10.3 (L) 14.0 - 18.0 g/dL Final     Iron   Date Value Ref Range Status   02/23/2023 39 (L) 45 - 160 ug/dL Final     Transferrin   Date Value Ref Range Status   02/23/2023 137 (L) 200 - 375 mg/dL Final     TIBC   Date Value Ref Range Status   02/23/2023 203 (L) 250 - 450 ug/dL Final     Saturated Iron   Date Value Ref Range Status   02/23/2023 19 (L) 20 - 50 % Final     Ferritin   Date Value Ref Range Status   02/23/2023 1,803 (H) 20.0 - 300.0 ng/mL Final     Hemoglobin at goal

## 2023-03-06 NOTE — SUBJECTIVE & OBJECTIVE
Interval History: HD completed this am, tolerated well. Net UF 1.7L. On 3 L NC. Plan for stepdown today.     Review of patient's allergies indicates:  No Known Allergies  Current Facility-Administered Medications   Medication Frequency    0.9%  NaCl infusion Once    acetaminophen tablet 650 mg Q4H PRN    albuterol inhaler 2 puff Q6H PRN    apixaban tablet 5 mg BID    aspirin EC tablet 81 mg Daily    atorvastatin tablet 40 mg Daily    carvediloL tablet 6.25 mg BID    cetirizine tablet 10 mg Daily PRN    cloNIDine 0.3 mg/24 hr td ptwk 1 patch Every Sat    dextrose 10% bolus 125 mL 125 mL PRN    dextrose 10% bolus 250 mL 250 mL PRN    dextrose 40 % gel 15,000 mg PRN    dextrose 40 % gel 30,000 mg PRN    erythromycin 5 mg/gram (0.5 %) ophthalmic ointment Q8H    FLUoxetine capsule 40 mg Daily    fluticasone furoate-vilanteroL 100-25 mcg/dose diskus inhaler 1 puff Daily    hydrALAZINE tablet 100 mg Q8H    insulin aspart U-100 pen 3 Units TIDWM    insulin detemir U-100 pen 10 Units BID    isosorbide mononitrate 24 hr tablet 30 mg BID    melatonin tablet 9 mg Nightly PRN    mupirocin 2 % ointment BID    NIFEdipine 24 hr tablet 90 mg Daily    sodium chloride 0.65 % nasal spray 2 spray Q4H PRN    sodium chloride 0.9% flush 10 mL PRN    tiotropium bromide 2.5 mcg/actuation inhaler 2 puff Daily    vitamin renal formula (B-complex-vitamin c-folic acid) 1 mg per capsule 1 capsule Daily    white petrolatum 41 % ointment PRN       Objective:     Vital Signs (Most Recent):  Temp: 97.5 °F (36.4 °C) (03/06/23 0705)  Pulse: 63 (03/06/23 0905)  Resp: 16 (03/06/23 0905)  BP: (!) 168/79 (03/06/23 0905)  SpO2: 99 % (03/06/23 0905)   Vital Signs (24h Range):  Temp:  [97.2 °F (36.2 °C)-98.6 °F (37 °C)] 97.5 °F (36.4 °C)  Pulse:  [62-76] 63  Resp:  [13-22] 16  SpO2:  [91 %-100 %] 99 %  BP: (117-180)/(59-90) 168/79     Weight: 67.9 kg (149 lb 11.1 oz) (03/04/23 1437)  Body mass index is 22.11 kg/m².  Body surface area is 1.82 meters  squared.    I/O last 3 completed shifts:  In: 298.1 [I.V.:48.1; Other:250]  Out: 4800 [Other:4800]    Physical Exam  Vitals and nursing note reviewed.   Constitutional:       General: He is not in acute distress.     Appearance: Normal appearance. He is not ill-appearing or toxic-appearing.   HENT:      Head: Normocephalic and atraumatic.      Nose: Nose normal.      Comments: 3 liter NC     Mouth/Throat:      Mouth: Mucous membranes are moist.   Eyes:      General: No scleral icterus.        Right eye: No discharge.         Left eye: No discharge.      Pupils: Pupils are equal, round, and reactive to light.   Cardiovascular:      Rate and Rhythm: Normal rate.      Heart sounds: Murmur heard.      Comments: Left AVF  Pulmonary:      Effort: Pulmonary effort is normal. No respiratory distress.      Breath sounds: No wheezing.   Abdominal:      General: Abdomen is flat.   Musculoskeletal:         General: No swelling. Normal range of motion.      Right lower leg: No edema.      Left lower leg: No edema.   Skin:     General: Skin is warm.      Capillary Refill: Capillary refill takes less than 2 seconds.      Coloration: Skin is not jaundiced.      Findings: No bruising or lesion.   Neurological:      General: No focal deficit present.      Mental Status: He is alert.       Significant Labs:  CBC:   Recent Labs   Lab 03/06/23  0156   WBC 3.71*   RBC 3.53*   HGB 10.3*   HCT 32.9*      MCV 93   MCH 29.2   MCHC 31.3*     CMP:   Recent Labs   Lab 03/06/23  0156   GLU 61*   CALCIUM 8.1*   ALBUMIN 2.7*   PROT 5.7*      K 3.9   CO2 26      BUN 16   CREATININE 2.2*   ALKPHOS 233*   ALT 46*   AST 47*   BILITOT 0.7     All labs within the past 24 hours have been reviewed.

## 2023-03-06 NOTE — PROGRESS NOTES
03/06/23 0015   Handoff Report   Received From Primary RN   Given To ADELITA Davidson   Treatment Type   Treatment Type Acute   Vital Signs   Temp 98 °F (36.7 °C)   Temp Source Oral   Pulse 66   Heart Rate Source Monitor   Resp 18   SpO2 98 %   Pulse Oximetry Type Continuous   Oximetry Probe Site Intact   Flow (L/min) 3   Device (Oxygen Therapy) nasal cannula   /72   BP Location Right arm   BP Method Automatic   Patient Position Lying   Assessments (Pre/Post)   Consent Obtained yes   Level of Consciousness (AVPU) alert   Pre-Hemodialysis Assessment   Additional Dialysis Information Needed Yes   Patient Status Other (Comment)   Gross Bleach Negative Yes   Total Chlorine is less than 0.1 ppm Yes   Machine Number [id number]   Alarms Verified Yes   Reverse Osmosis Number y   Reverse Osmosis Machine Log Completed Yes   Extracorporeal Circuit Tested for Integrity Yes   pH 7   Machine Temperature 96.8 °F (36 °C)   Dialyzer F-160   Machine Conductivity 14   Meter Conductivity 14   Dialysate Na (mEq/L) 140   Dialysate K (mEq/L) 4   Dialysate CA (mEq/L) 2.5   Dialysate HCO3 (mEq/L) 30   Prime Ordered (mL) 250 mL   Treatment Initiation with Dialysis Precautions All connections secured;Saline line double clamped;Venous parameters set;Arterial parameters set;Prime given;Air foam detector engaged   Prime Amount Given (mL) 250 mL   Net UF Goal 2000   Total UF Goal 2500   Pre-Hemodialysis Comments see note   UF Rate 860   RO # / DI #  21   RO Rejection Within Limits? Yes   Timeout Performed 2345   During Hemodialysis Assessment   Blood Flow Rate (mL/min) 200 mL/min   Dialysate Flow Rate (mL/min) 800 ml/min   Ultrafiltration Rate (mL/Hr) 860 mL/Hr   Arteriovenous Lines Secure Yes   Arterial Pressure (mmHg) -150 mmHg   Venous Pressure (mmHg) 120   Blood Volume Processed (Liters) 0 L   UF Removed (mL) 0 mL   TMP 50   Venous Line in Air Detector Yes   Intake (mL) 250 mL   Transducer Dry Yes   Access Visible Yes    notified of access  issue? N/A   Heparin given? N/A   Intra-Hemodialysis Comments HD tx 1:1 at pt bedside started without complications. see note     HD tx started 1:1 at pt bedside per R AVF

## 2023-03-06 NOTE — NURSING TRANSFER
Nursing Transfer Note      3/6/2023    Reason patient is being transferred: step down orders    Transfer To: [id number]    Transfer via bed    Transfer with  to O2    Transported by ADELITA Bonilla     Medicines sent: Insulin and eye jel    Any special needs or follow-up needed: n/a    Chart send with patient: Yes    Receiving nurseMaria E at bedside     Upon arrival to floor: patient oriented to room, call bell in reach, and bed in lowest position

## 2023-03-06 NOTE — ASSESSMENT & PLAN NOTE
Patient with Hypoxic Respiratory failure which is Acute on chronic.  he is on home oxygen at 3 LPM. Supplemental oxygen was provided and noted-  .   Signs/symptoms of respiratory failure include- tachypnea, increased work of breathing and respiratory distress. Contributing diagnoses includes - Fluid overload Labs and images were reviewed. Patient Has recent ABG, which has been reviewed. Will treat underlying causes and adjust management of respiratory failure as follows-     CXR Unchanged bilateral ground-glass airspace, suggestive of diffuse pulmonary edema in the setting of HFpEF and ESRD on HD.     · BiPAP transitioned to nasal canula after first HD session  · DuoNebs q4hrs prn  · Continue home Breo inhaler

## 2023-03-06 NOTE — ASSESSMENT & PLAN NOTE
ESRD on HD  Patient unsure of own prescription    iHD done for hypertensive emergency with increased oxygen requirement 3/4    Plan/Recommendation  -Continue MWF schedule while IP   -avoid gadolinium contrast  -renal diet, low K

## 2023-03-06 NOTE — PROGRESS NOTES
[facility] - Cardiac Medical ICU  Nephrology  Progress Note    Patient Name: Cosme Lewis  MRN: [id number]  Admission Date: 3/4/2023  Hospital Length of Stay: 2 days  Attending Provider: Edil Simmons*   Primary Care Physician: Primary Doctor No  Principal Problem:Acute on chronic respiratory failure with hypoxia    Subjective:       Interval History: HD completed this am, tolerated well. Net UF 1.7L. On 3 L NC. Plan for stepdown today.     Review of patient's allergies indicates:  No Known Allergies  Current Facility-Administered Medications   Medication Frequency    0.9%  NaCl infusion Once    acetaminophen tablet 650 mg Q4H PRN    albuterol inhaler 2 puff Q6H PRN    apixaban tablet 5 mg BID    aspirin EC tablet 81 mg Daily    atorvastatin tablet 40 mg Daily    carvediloL tablet 6.25 mg BID    cetirizine tablet 10 mg Daily PRN    cloNIDine 0.3 mg/24 hr td ptwk 1 patch Every Sat    dextrose 10% bolus 125 mL 125 mL PRN    dextrose 10% bolus 250 mL 250 mL PRN    dextrose 40 % gel 15,000 mg PRN    dextrose 40 % gel 30,000 mg PRN    erythromycin 5 mg/gram (0.5 %) ophthalmic ointment Q8H    FLUoxetine capsule 40 mg Daily    fluticasone furoate-vilanteroL 100-25 mcg/dose diskus inhaler 1 puff Daily    hydrALAZINE tablet 100 mg Q8H    insulin aspart U-100 pen 3 Units TIDWM    insulin detemir U-100 pen 10 Units BID    isosorbide mononitrate 24 hr tablet 30 mg BID    melatonin tablet 9 mg Nightly PRN    mupirocin 2 % ointment BID    NIFEdipine 24 hr tablet 90 mg Daily    sodium chloride 0.65 % nasal spray 2 spray Q4H PRN    sodium chloride 0.9% flush 10 mL PRN    tiotropium bromide 2.5 mcg/actuation inhaler 2 puff Daily    vitamin renal formula (B-complex-vitamin c-folic acid) 1 mg per capsule 1 capsule Daily    white petrolatum 41 % ointment PRN       Objective:     Vital Signs (Most Recent):  Temp: 97.5 °F (36.4 °C) (03/06/23 0705)  Pulse: 63 (03/06/23 0905)  Resp: 16 (03/06/23 0905)  BP:  (!) 168/79 (03/06/23 0905)  SpO2: 99 % (03/06/23 0905)   Vital Signs (24h Range):  Temp:  [97.2 °F (36.2 °C)-98.6 °F (37 °C)] 97.5 °F (36.4 °C)  Pulse:  [62-76] 63  Resp:  [13-22] 16  SpO2:  [91 %-100 %] 99 %  BP: (117-180)/(59-90) 168/79     Weight: 67.9 kg (149 lb 11.1 oz) (03/04/23 1437)  Body mass index is 22.11 kg/m².  Body surface area is 1.82 meters squared.    I/O last 3 completed shifts:  In: 298.1 [I.V.:48.1; Other:250]  Out: 4800 [Other:4800]    Physical Exam  Vitals and nursing note reviewed.   Constitutional:       General: He is not in acute distress.     Appearance: Normal appearance. He is not ill-appearing or toxic-appearing.   HENT:      Head: Normocephalic and atraumatic.      Nose: Nose normal.      Comments: 3 liter NC     Mouth/Throat:      Mouth: Mucous membranes are moist.   Eyes:      General: No scleral icterus.        Right eye: No discharge.         Left eye: No discharge.      Pupils: Pupils are equal, round, and reactive to light.   Cardiovascular:      Rate and Rhythm: Normal rate.      Heart sounds: Murmur heard.      Comments: Left AVF  Pulmonary:      Effort: Pulmonary effort is normal. No respiratory distress.      Breath sounds: No wheezing.   Abdominal:      General: Abdomen is flat.   Musculoskeletal:         General: No swelling. Normal range of motion.      Right lower leg: No edema.      Left lower leg: No edema.   Skin:     General: Skin is warm.      Capillary Refill: Capillary refill takes less than 2 seconds.      Coloration: Skin is not jaundiced.      Findings: No bruising or lesion.   Neurological:      General: No focal deficit present.      Mental Status: He is alert.       Significant Labs:  CBC:   Recent Labs   Lab 03/06/23  0156   WBC 3.71*   RBC 3.53*   HGB 10.3*   HCT 32.9*      MCV 93   MCH 29.2   MCHC 31.3*     CMP:   Recent Labs   Lab 03/06/23 0156   GLU 61*   CALCIUM 8.1*   ALBUMIN 2.7*   PROT 5.7*      K 3.9   CO2 26      BUN 16    CREATININE 2.2*   ALKPHOS 233*   ALT 46*   AST 47*   BILITOT 0.7     All labs within the past 24 hours have been reviewed.       Assessment/Plan:     Pulmonary  * Acute on chronic respiratory failure with hypoxia  COPD 3 liters NC at baseline    -per primary    Renal/  Chronic kidney disease-mineral and bone disorder  Calcium   Date Value Ref Range Status   03/05/2023 8.2 (L) 8.7 - 10.5 mg/dL Final     Phosphorus   Date Value Ref Range Status   03/05/2023 1.6 (L) 2.7 - 4.5 mg/dL Final     Obtain PTH    ESRD on dialysis  ESRD on HD  Patient unsure of own prescription    iHD done for hypertensive emergency with increased oxygen requirement 3/4    Plan/Recommendation  -Continue MWF schedule while IP   -avoid gadolinium contrast  -renal diet, low K      Oncology  Anemia in ESRD (end-stage renal disease)  Goal hemoglobin in ESRD is 10-12  Hemoglobin   Date Value Ref Range Status   03/06/2023 10.3 (L) 14.0 - 18.0 g/dL Final     Iron   Date Value Ref Range Status   02/23/2023 39 (L) 45 - 160 ug/dL Final     Transferrin   Date Value Ref Range Status   02/23/2023 137 (L) 200 - 375 mg/dL Final     TIBC   Date Value Ref Range Status   02/23/2023 203 (L) 250 - 450 ug/dL Final     Saturated Iron   Date Value Ref Range Status   02/23/2023 19 (L) 20 - 50 % Final     Ferritin   Date Value Ref Range Status   02/23/2023 1,803 (H) 20.0 - 300.0 ng/mL Final     Hemoglobin at goal          Thank you for your consult. I will follow-up with patient. Please contact us if you have any additional questions.    Citlalli Simpson, KUMAR  Nephrology  [facility] - Cardiac Medical ICU

## 2023-03-06 NOTE — PROGRESS NOTES
[facility] - Cardiac Medical ICU  Critical Care Medicine  Progress Note    Patient Name: Cosme Lewis  MRN: [id number]  Admission Date: 3/4/2023  Hospital Length of Stay: 2 days  Code Status: Full Code  Attending Provider: Edil Simmons*  Primary Care Provider: Primary Doctor No   Principal Problem: Acute on chronic respiratory failure with hypoxia    Subjective:     HPI:  Cosme Lewis is a 57 y.o. with CKD on HD MWF, DM1, Renovascular HTN, Chronic respiratory failure, HFpEF, and Mild malnutrition who was BIBA to [facility] ED on 3/4/23 because of high BG noted at his NH ([facility]).  EMS got a POCT of >500 mg/dL.  They also noted him to be hypertensive and in respiratory distress on his usual 3L NC; SpO2 was 87%.  EMS placed him on 6 L and SpO2 improved to 90%.  He was immediately placed on BiPAP on arrival to [facility].  He was also given IV pushes of labetalol and hydralazine, to no avail.  He complained to ED staff of cough with grossly bloody sputum for three days and increased shortness of breath from his baseline.  MICU was consulted for admission given his BiPAP need and hypertensive urgency/emergency.       On our arrival he remained tachypneic and hypertensive to >200/110, and appeared tired and uncomfortable.  He says he was last dialyzed on 3/3 but is unsure of how much fluid was removed.  He denies abdominal pain but abdomen appeared distended.  He reports he vomited twice over the last three days while attempting to have a bowel movement, but also says he has had normal bowel movements in the meantime.  He denied recent cough on our questioning, and further denies fever, chills, headache, dizziness, and chest pain.      Admitted to MICU for further evaluation and treatment.    Hospital/ICU Course:  57 year old male admitted to the MICU with acute hypoxemic respiratory failure 2/2 volume overload, hypertensive emergency and hyperglycemia. He was initially placed on BiPAP but after one session of HD, he was  able to be weaned to nasal canula at 3 L, which is baseline. He was temporarily placed on a cardene gtt which was turned off and resumption of home anti-hypertensives resumed. Insulin gtt transitioned to subcutaneous insulin with improvement in blood glucose. Due to hypoglycemia, his insulin regimen is being adjusted which will delay discharge.       Interval History/Significant Events:   ALICIA Became hypoglycemic this morning, making adjustments to insulin. Medically ready for stepdown to medicine.     Review of Systems   Constitutional:  Negative for chills and fever.   Respiratory:  Negative for cough and shortness of breath.    Gastrointestinal:  Negative for abdominal pain, diarrhea, nausea and vomiting.   Skin:  Negative for rash.   Objective:     Vital Signs (Most Recent):  Temp: 97.5 °F (36.4 °C) (03/06/23 0705)  Pulse: 63 (03/06/23 1005)  Resp: 15 (03/06/23 1005)  BP: (!) 147/70 (03/06/23 1005)  SpO2: 100 % (03/06/23 1005)   Vital Signs (24h Range):  Temp:  [97.5 °F (36.4 °C)-98.6 °F (37 °C)] 97.5 °F (36.4 °C)  Pulse:  [62-76] 63  Resp:  [13-22] 15  SpO2:  [91 %-100 %] 100 %  BP: (117-174)/(59-88) 147/70   Weight: 67.9 kg (149 lb 11.1 oz)  Body mass index is 22.11 kg/m².      Intake/Output Summary (Last 24 hours) at 3/6/2023 1108  Last data filed at 3/6/2023 0805  Gross per 24 hour   Intake 490 ml   Output 2300 ml   Net -1810 ml       Physical Exam  Vitals reviewed.   Constitutional:       General: He is not in acute distress.     Appearance: Normal appearance. He is ill-appearing.   HENT:      Head: Normocephalic and atraumatic.   Eyes:      Extraocular Movements: Extraocular movements intact.      Pupils: Pupils are equal, round, and reactive to light.   Cardiovascular:      Rate and Rhythm: Normal rate and regular rhythm.   Pulmonary:      Effort: Pulmonary effort is normal.   Abdominal:      General: There is no distension.   Musculoskeletal:         General: Normal range of motion.   Skin:     General:  Skin is warm.   Neurological:      Mental Status: He is alert and oriented to person, place, and time. Mental status is at baseline.       Vents:  Oxygen Concentration (%): 40 (03/05/23 0200)  Lines/Drains/Airways       Drain  Duration                  Hemodialysis AV Fistula Left upper arm -- days              Peripheral Intravenous Line  Duration                  Peripheral IV - Single Lumen 03/06/23 0815 20 G Anterior;Proximal;Right Upper Arm <1 day                  Significant Labs:    CBC/Anemia Profile:  Recent Labs   Lab 03/04/23  1545 03/04/23  1707 03/05/23  0155 03/06/23  0156   WBC 4.00  --  4.06 3.71*   HGB 11.1*  --  11.2* 10.3*   HCT 35.6* 36 35.2* 32.9*     --  161 153   MCV 96  --  92 93   RDW 15.1*  --  14.7* 14.9*        Chemistries:  Recent Labs   Lab 03/04/23  1659 03/04/23  2325 03/05/23  0155 03/06/23  0156   * 134* 135* 136   K 3.8 3.2* 3.5 3.9   CL 97 99 102 104   CO2 19* 26 25 26   BUN 29* 23* 12 16   CREATININE 3.3* 2.8* 1.6* 2.2*   CALCIUM 7.7* 7.9* 8.2* 8.1*   ALBUMIN 2.9*  --  2.9* 2.7*   PROT 6.1  --  6.3 5.7*   BILITOT 0.8  --  0.8 0.7   ALKPHOS 305*  --  283* 233*   ALT 73*  --  62* 46*   *  --  63* 47*   MG  --  1.9 1.9 1.9   PHOS  --  2.0* 1.6* 2.1*       All pertinent labs within the past 24 hours have been reviewed.    Significant Imaging:  I have reviewed all pertinent imaging results/findings within the past 24 hours.      ABG  Recent Labs   Lab 03/04/23  1631   PH 7.419   PO2 44   PCO2 48.5*   HCO3 31.4*   BE 7     Assessment/Plan:     Pulmonary  * Acute on chronic respiratory failure with hypoxia  Patient with Hypoxic Respiratory failure which is Acute on chronic.  he is on home oxygen at 3 LPM. Supplemental oxygen was provided and noted-  .   Signs/symptoms of respiratory failure include- tachypnea, increased work of breathing and respiratory distress. Contributing diagnoses includes - Fluid overload Labs and images were reviewed. Patient Has recent ABG,  which has been reviewed. Will treat underlying causes and adjust management of respiratory failure as follows-     CXR Unchanged bilateral ground-glass airspace, suggestive of diffuse pulmonary edema in the setting of HFpEF and ESRD on HD.     · BiPAP transitioned to nasal canula after first HD session  · DuoNebs q4hrs prn  · Continue home Breo inhaler       Cardiac/Vascular  Hypertensive emergency  Elevated /110 Home medications: Clonidine patch 0.3mg, Coreg 6.25 mg BID, hydralazine 100mg q8hrs, Imdur 30 mg BID, Nifedipine 90 daily    · Cardene gtt turned off 3/05 around midnight  · Restart home blood pressure medications    Chronic diastolic heart failure  Most Recent Echo 11/22/22:   The left ventricle is normal in size with mild concentric hypertrophy and increased density and normal systolic function.   The estimated ejection fraction is 63%.   Grade II left ventricular diastolic dysfunction.   Severe left atrial enlargement.   Mild right ventricular enlargement with low normal right ventricular systolic function.   There is right ventricular hypertrophy.   Moderate right atrial enlargement.   Mild to moderate tricuspid regurgitation.   Mild pulmonic regurgitation.   Elevated central venous pressure (15 mmHg).   The estimated PA systolic pressure is 67 mmHg.   There is moderate-severe pulmonary hypertension.   Trivial posterolateral pericardial effusion.   There is a moderate left pleural effusion.   There may be some ascites present.    On presentation: Troponin elevated at 0.028, BNP > 4500.    · Nephrology consulted for urgent HD volume removal  · Resuming home meds  · Will monitor volume status and diurese further as warranted     Renal/  ESRD on dialysis  M/W/F schedule. Pt states he went to dialysis yesterday 3/3/23, and is unsure how much fluid was removed. BNP 4,520 on presentation.    · Nephrology consulted for emergent dialysis  · Oxygen weaned to nasal canula (baseline) after  first session  · HD per nephrology recommendations while still admitted    Hematology  Multiple subsegmental pulmonary emboli without acute cor pulmonale  Hx of pulmonary thromboembolism within a lobar branch to the right lower lobe noting additional pulmonary thromboembolism within segmental branches to the right upper lobe in 10/2022    - Continue home Eliquis    Oncology  Anemia in ESRD (end-stage renal disease)  Hgb 11.1 on admission.  WCTM with Daily CBC and transfuse for Hgb < 7.    Endocrine  Hyperglycemia  Glucose 691, Beta Hydroxybutyrate 0.2, anion gap 14, CO2 19. Not in DKA. Given 10 units of regular insulin IV in the ED. Home regimen Aspart 5 units TID with meals , Detemir 20 units in am, 15 units qhs.     · Insulin gtt turned off  · Resumed home insulin regimen   · Due to hypoglycemic on 3/06, making changes  · Recent A1c 9.5    GI  Elevated liver enzymes  Abdomen distended; unclear if he has ascites that is contributing to his current clinical picture.      · US Abdomen with hepatomegaly and ascites  · Daily liver function panel       Critical Care Daily Checklist:    A: Awake: RASS Goal/Actual Goal: RASS Goal: 0-->alert and calm  Actual:     B: Spontaneous Breathing Trial Performed?     C: SAT & SBT Coordinated?  NA                      D: Delirium: CAM-ICU Overall CAM-ICU: Negative   E: Early Mobility Performed? Yes   F: Feeding Goal:    Status:     Current Diet Order   Procedures    Diet diabetic [facility]; 2000 Calorie; Renal, Low Sodium,2gm     Order Specific Question:   Indicate patient location for additional diet options:     Answer:   [facility]     Order Specific Question:   Total calories:     Answer:   2000 Calorie     Order Specific Question:   Additional Diet Options:     Answer:   Renal     Order Specific Question:   Additional Diet Options:     Answer:   Low Sodium,2gm      AS: Analgesia/Sedation No   T: Thromboembolic Prophylaxis Eliquis   H: HOB > 300 Yes   U: Stress  Ulcer Prophylaxis (if needed)    G: Glucose Control Basal bolus   B: Bowel Function     I: Indwelling Catheter (Lines & Dutta) Necessity PIV   D: De-escalation of Antimicrobials/Pharmacotherapies none    Plan for the day/ETD Control BG    Code Status:  Family/Goals of Care: Full Code         Critical secondary to Patient has a condition that poses threat to life and bodily function: Acute hypoxemic respiratory failure     Critical care was time spent personally by me on the following activities: development of treatment plan with patient or surrogate and bedside caregivers, discussions with consultants, evaluation of patient's response to treatment, examination of patient, ordering and performing treatments and interventions, ordering and review of laboratory studies, ordering and review of radiographic studies, pulse oximetry, re-evaluation of patient's condition. This critical care time did not overlap with that of any other provider or involve time for any procedures.     Hien Wright MD  Critical Care Medicine  [facility] - Cardiac Medical ICU

## 2023-03-06 NOTE — PROGRESS NOTES
HD tx 1:1 at bedside ccompleted 30 early due to computer board on dialysis machine froze.  Pt had 3 hour tx removed 1800ml fluid. Blood returned needles pulled and bleeding stopped.   03/06/23 0200   Vital Signs   Temp 98 °F (36.7 °C)   Temp Source Oral   Pulse 76   Heart Rate Source Monitor   Resp 18   SpO2 100 %   Pulse Oximetry Type Continuous   /80   BP Location Right arm   BP Method Automatic   Patient Position Lying   Assessments (Pre/Post)   Blood Liters Processed (BLP) 45   Transport Modality not applicable   Level of Consciousness (AVPU) alert   Dialyzer Clearance clear   During Hemodialysis Assessment   Blood Flow Rate (mL/min) 400 mL/min   Dialysate Flow Rate (mL/min) 800 ml/min   Ultrafiltration Rate (mL/Hr) 860 mL/Hr   Arteriovenous Lines Secure Yes   Post-Hemodialysis Assessment   Rinseback Volume (mL) 250 mL   Blood Volume Processed (Liters) 45 L   Dialyzer Clearance Clear   Duration of Treatment 150 minutes   Additional Fluid Intake (mL) 250 mL   Total UF (mL) 2300 mL   Net Fluid Removal 1700   Patient Response to Treatment tolerated well   Arterial bleeding stop time (min) 5 min   Venous bleeding stop time (min) 5 min   Post-Hemodialysis Comments see note HD tx ended

## 2023-03-06 NOTE — ASSESSMENT & PLAN NOTE
Elevated /110 Home medications: Clonidine patch 0.3mg, Coreg 6.25 mg BID, hydralazine 100mg q8hrs, Imdur 30 mg BID, Nifedipine 90 daily    · Cardene gtt turned off 3/05 around midnight  · Restart home blood pressure medications

## 2023-03-06 NOTE — ASSESSMENT & PLAN NOTE
Abdomen distended; unclear if he has ascites that is contributing to his current clinical picture.      · US Abdomen with hepatomegaly and ascites  · Daily liver function panel

## 2023-03-06 NOTE — ASSESSMENT & PLAN NOTE
Hx of pulmonary thromboembolism within a lobar branch to the right lower lobe noting additional pulmonary thromboembolism within segmental branches to the right upper lobe in 10/2022    - Continue home Eliquis

## 2023-03-06 NOTE — SUBJECTIVE & OBJECTIVE
Interval History/Significant Events:   NAEO. Became hypoglycemic this morning, making adjustments to insulin. Medically ready for stepdown to medicine.     Review of Systems   Constitutional:  Negative for chills and fever.   Respiratory:  Negative for cough and shortness of breath.    Gastrointestinal:  Negative for abdominal pain, diarrhea, nausea and vomiting.   Skin:  Negative for rash.   Objective:     Vital Signs (Most Recent):  Temp: 97.5 °F (36.4 °C) (03/06/23 0705)  Pulse: 63 (03/06/23 1005)  Resp: 15 (03/06/23 1005)  BP: (!) 147/70 (03/06/23 1005)  SpO2: 100 % (03/06/23 1005)   Vital Signs (24h Range):  Temp:  [97.5 °F (36.4 °C)-98.6 °F (37 °C)] 97.5 °F (36.4 °C)  Pulse:  [62-76] 63  Resp:  [13-22] 15  SpO2:  [91 %-100 %] 100 %  BP: (117-174)/(59-88) 147/70   Weight: 67.9 kg (149 lb 11.1 oz)  Body mass index is 22.11 kg/m².      Intake/Output Summary (Last 24 hours) at 3/6/2023 1108  Last data filed at 3/6/2023 0805  Gross per 24 hour   Intake 490 ml   Output 2300 ml   Net -1810 ml       Physical Exam  Vitals reviewed.   Constitutional:       General: He is not in acute distress.     Appearance: Normal appearance. He is ill-appearing.   HENT:      Head: Normocephalic and atraumatic.   Eyes:      Extraocular Movements: Extraocular movements intact.      Pupils: Pupils are equal, round, and reactive to light.   Cardiovascular:      Rate and Rhythm: Normal rate and regular rhythm.   Pulmonary:      Effort: Pulmonary effort is normal.   Abdominal:      General: There is no distension.   Musculoskeletal:         General: Normal range of motion.   Skin:     General: Skin is warm.   Neurological:      Mental Status: He is alert and oriented to person, place, and time. Mental status is at baseline.       Vents:  Oxygen Concentration (%): 40 (03/05/23 0200)  Lines/Drains/Airways       Drain  Duration                  Hemodialysis AV Fistula Left upper arm -- days              Peripheral Intravenous Line  Duration                   Peripheral IV - Single Lumen 03/06/23 0815 20 G Anterior;Proximal;Right Upper Arm <1 day                  Significant Labs:    CBC/Anemia Profile:  Recent Labs   Lab 03/04/23  1545 03/04/23  1707 03/05/23  0155 03/06/23  0156   WBC 4.00  --  4.06 3.71*   HGB 11.1*  --  11.2* 10.3*   HCT 35.6* 36 35.2* 32.9*     --  161 153   MCV 96  --  92 93   RDW 15.1*  --  14.7* 14.9*        Chemistries:  Recent Labs   Lab 03/04/23  1659 03/04/23  2325 03/05/23  0155 03/06/23  0156   * 134* 135* 136   K 3.8 3.2* 3.5 3.9   CL 97 99 102 104   CO2 19* 26 25 26   BUN 29* 23* 12 16   CREATININE 3.3* 2.8* 1.6* 2.2*   CALCIUM 7.7* 7.9* 8.2* 8.1*   ALBUMIN 2.9*  --  2.9* 2.7*   PROT 6.1  --  6.3 5.7*   BILITOT 0.8  --  0.8 0.7   ALKPHOS 305*  --  283* 233*   ALT 73*  --  62* 46*   *  --  63* 47*   MG  --  1.9 1.9 1.9   PHOS  --  2.0* 1.6* 2.1*       All pertinent labs within the past 24 hours have been reviewed.    Significant Imaging:  I have reviewed all pertinent imaging results/findings within the past 24 hours.

## 2023-03-06 NOTE — ASSESSMENT & PLAN NOTE
M/W/F schedule. Pt states he went to dialysis yesterday 3/3/23, and is unsure how much fluid was removed. BNP 4,520 on presentation.    · Nephrology consulted for emergent dialysis  · Oxygen weaned to nasal canula (baseline) after first session  · HD per nephrology recommendations while still admitted

## 2023-03-06 NOTE — NURSING
Patient has been assessed (1930, 3/5); HR 73, /86, SpO2 95% on 3L NC (baseline), T 97.7, POCT 142; night medications given per MD order, see MAR; patient expected to be discharged back to NH ([facility] in the AM; denies any discomfort, bed in low, SR up x3, call light in reach; will continue to monitor

## 2023-03-06 NOTE — ASSESSMENT & PLAN NOTE
Most Recent Echo 11/22/22:   The left ventricle is normal in size with mild concentric hypertrophy and increased density and normal systolic function.   The estimated ejection fraction is 63%.   Grade II left ventricular diastolic dysfunction.   Severe left atrial enlargement.   Mild right ventricular enlargement with low normal right ventricular systolic function.   There is right ventricular hypertrophy.   Moderate right atrial enlargement.   Mild to moderate tricuspid regurgitation.   Mild pulmonic regurgitation.   Elevated central venous pressure (15 mmHg).   The estimated PA systolic pressure is 67 mmHg.   There is moderate-severe pulmonary hypertension.   Trivial posterolateral pericardial effusion.   There is a moderate left pleural effusion.   There may be some ascites present.    On presentation: Troponin elevated at 0.028, BNP > 4500.    · Nephrology consulted for urgent HD volume removal  · Resuming home meds  · Will monitor volume status and diurese further as warranted

## 2023-03-06 NOTE — ASSESSMENT & PLAN NOTE
Glucose 691, Beta Hydroxybutyrate 0.2, anion gap 14, CO2 19. Not in DKA. Given 10 units of regular insulin IV in the ED. Home regimen Aspart 5 units TID with meals , Detemir 20 units in am, 15 units qhs.     · Insulin gtt turned off  · Resumed home insulin regimen   · Due to hypoglycemic on 3/06, making changes  · Recent A1c 9.5

## 2023-03-07 PROBLEM — E11.65 TYPE 2 DIABETES MELLITUS WITH HYPERGLYCEMIA, WITH LONG-TERM CURRENT USE OF INSULIN: Status: ACTIVE | Noted: 2023-01-01

## 2023-03-07 PROBLEM — Z79.4 TYPE 2 DIABETES MELLITUS WITH HYPERGLYCEMIA, WITH LONG-TERM CURRENT USE OF INSULIN: Status: ACTIVE | Noted: 2023-01-01

## 2023-03-07 NOTE — ASSESSMENT & PLAN NOTE
Patient has a current diagnosis of Hypertensive emergency with end organ damage evidenced by acute pulmonary edema without heart failure which is uncontrolled.  Latest blood pressure and vitals reviewed-   Temp:  [97.2 °F (36.2 °C)-98.1 °F (36.7 °C)]   Pulse:  [62-70]   Resp:  [16-21]   BP: (143-175)/(66-89)   SpO2:  [93 %-96 %] .   Patient currently on IV antihypertensives.   Home meds for hypertension were reviewed and noted below.   Hypertension Medications             carvediloL (COREG) 3.125 MG tablet Take 2 tablets (6.25 mg total) by mouth 2 (two) times daily.    cloNIDine 0.3 mg/24 hr td ptwk (CATAPRES) 0.3 mg/24 hr Place 1 patch onto the skin every Saturday.    hydrALAZINE (APRESOLINE) 100 MG tablet Take 1 tablet (100 mg total) by mouth every 8 (eight) hours.    isosorbide mononitrate (IMDUR) 30 MG 24 hr tablet Take 30 mg by mouth 2 (two) times daily.    NIFEdipine (PROCARDIA-XL) 90 MG (OSM) 24 hr tablet Take 1 tablet (90 mg total) by mouth once daily.        Medication adjustment for hospital antihypertensives is as follows- per ICU  Will aim for controlled BP reduction by medications noted above. Monitor and mitigate end organ damage as indicated.  Elevated /110 Home medications: Clonidine patch 0.3mg, Coreg 6.25 mg BID, hydralazine 100mg q8hrs, Imdur 30 mg BID, Nifedipine 90 daily   S/p Cardene gtt in ICU   Nephrology consulted for HD for volume removal   Restart home blood pressure medications    3/7- BP improving, resolved

## 2023-03-07 NOTE — ASSESSMENT & PLAN NOTE
Patient with Hypoxic Respiratory failure which is Acute on chronic.  he is on home oxygen at 3 LPM. Supplemental oxygen was provided and noted- Oxygen Concentration (%):  [40] 40.   Signs/symptoms of respiratory failure include- tachypnea, increased work of breathing and respiratory distress. Contributing diagnoses includes - Fluid overload Labs and images were reviewed. Patient Has recent ABG, which has been reviewed. Will treat underlying causes and adjust management of respiratory failure as follows-      CXR Unchanged bilateral ground-glass airspace, suggestive of diffuse pulmonary edema in the setting of HFpEF and ESRD on HD.      S/p  BiPAP in ICU, for fluid overload and hypercapnia   Nephrology consulted for urgent HD   Was given Lasix 120 mg IV x 1, will extend as needed   Wean O2 as tolerated   DuoNebs q4hrs   Continue home Breo inhaler    3/7- improved after HD, 95% on 3 liters, continue attempts to wean. Home Oxygen is 3 liters   2023 00:05

## 2023-03-07 NOTE — PT/OT/SLP EVAL
"Occupational Therapy   Evaluation/Discharge    Name: Cosme Lewis  MRN: [id number]  Admitting Diagnosis: Acute on chronic respiratory failure with hypoxia  Recent Surgery: * No surgery found *      Recommendations:     Discharge Recommendations: other (see comments)  Discharge Equipment Recommendations:  none  Barriers to discharge:  None    Assessment:     Cosme Lewis is a 57 y.o. male with a medical diagnosis of Acute on chronic respiratory failure with hypoxia.  He presents with decreased fx mobility and ADL skills. Evaluation was performed on pt, pt reported he requires assistance with mobility, transfer, and all ADL. Two attempts required for evaluation as pt got agitated and refused to cooperate the first time until he has had his breakfast. Performance deficits affecting function: weakness, impaired endurance, impaired functional mobility, impaired balance, decreased safety awareness, impaired cardiopulmonary response to activity. Pt is discharged with no further OT services needs at this time. Upon d/c pt is to return to [facility] nursing home he resides in.     Rehab Prognosis: Fair    Plan:     Patient to be seen  (0) to address the above listed problems via self-care/home management, therapeutic exercises  Plan of Care Expires: 04/06/23  Plan of Care Reviewed with: patient    Subjective     Chief Complaint: "I am hungry"  Patient/Family Comments/goals: Return to assisted living    Occupational Profile:  Living Environment: Pt resides in a accessible assisted living facility  Previous level of function: Dependent with ADL and fx mobility  Roles and Routines: Unknown  Equipment Used at Home: walker, rolling, wheelchair (otheras unclear pt inconsistent with communication)  Assistance upon Discharge: Staff, mother    Pain/Comfort:  Pain Rating 1: 0/10    Patients cultural, spiritual, Holiness conflicts given the current situation: no    Objective:     Communicated with: RN prior to session.  Patient found up in " chair with oxygen upon OT entry to room.    General Precautions: Standard, fall  Orthopedic Precautions: N/A  Braces: N/A  Respiratory Status: Nasal cannula, flow 2.5 L/min    Occupational Performance:      Functional Mobility/Transfers:  Patient completed Sit <> Stand Transfer with minimum assistance x2  with  rolling walker       Activities of Daily Living:  Upper Body Dressing: minimum assistance to don hospital gown posteriorly    Cognitive/Visual Perceptual:  Cognitive/Psychosocial Skills:     -       Oriented to: Person, Place, Time, and Situation   -       Follows Commands/attention:Follows one-step commands  -       Communication: clear/fluent  -       Memory: No Deficits noted  -       Safety awareness/insight to disability: impaired   -       Mood/Affect/Coping skills/emotional control: Agitated  Visual/Perceptual:      -Intact      Physical Exam:  Balance:   Sitting: SBA - SPV  Postural examination/scapula alignment:    -       No postural abnormalities identified  Skin integrity: Visible skin intact  Edema:  None noted  Sensation:    -       Intact  Dominant hand: Right  Upper Extremity Range of Motion:     -       Right Upper Extremity: Deficits: Limited to 90  -       Left Upper Extremity: Deficits: Limited to 90  Upper Extremity Strength:    -       Right Upper Extremity: WFL  -       Left Upper Extremity: WFL   Strength:    -       Right Upper Extremity: WFL  -       Left Upper Extremity: WFL    AMPAC 6 Click ADL:  AMPAC Total Score: 13    Treatment & Education:  Pt participated in fx mobility and ADLs  to address current status of decreased mobility, endurance, and, safety awareness. Pt was educated on tx plan for the session, role of OT, and OT POC.  Pt was educated on importance safe mobility, pacing, and therapeutic activities during the recovery process and upon discharge. Pt voiced no further concern regarding the OT tx and is scheduled to be followed up as appropriated.      Patient left  up in chair with all lines intact and call button in reach    GOALS: No goals required  Multidisciplinary Problems       Occupational Therapy Goals       Not on file                    History:     Past Medical History:   Diagnosis Date    Chronic kidney disease-mineral and bone disorder 10/12/2022    COPD (chronic obstructive pulmonary disease)     Diabetes mellitus type 1     ESRD on dialysis     Hypertension     Hypervolemia 2/22/2023    Hyponatremia 3/4/2023    SOB (shortness of breath) 10/12/2022    Patient with history COPD treated with Ellipta the albuterol, fluticasone-salmeterol tachypneic in the ED saturating 94% and 6 L on nasal cannula, patient does not know how many  he uses it is in nursing home.    -duo nebs Q 4  Given that patient is a poor historian, and in the setting of left lower extremity edema and history of coagulopathy PE cannot be ruled out.  Will workup for possible infec         Past Surgical History:   Procedure Laterality Date    AV FISTULA PLACEMENT         Time Tracking:     OT Date of Treatment:    OT Start Time: 1056  OT Stop Time: 1118  OT Total Time (min): 22 min    Billable Minutes:Evaluation 22    3/7/2023

## 2023-03-07 NOTE — PLAN OF CARE
PT Evaluation completed and PT POC established.    Problem: Physical Therapy  Goal: Physical Therapy Goal  Description: Pt is at functional baseline with no acute needs. PT to sign off. Please reconsult if status changes.      Outcome: Met

## 2023-03-07 NOTE — ASSESSMENT & PLAN NOTE
Goal hemoglobin in ESRD is 10-12  Hemoglobin   Date Value Ref Range Status   03/07/2023 10.6 (L) 14.0 - 18.0 g/dL Final     Iron   Date Value Ref Range Status   02/23/2023 39 (L) 45 - 160 ug/dL Final     Transferrin   Date Value Ref Range Status   02/23/2023 137 (L) 200 - 375 mg/dL Final     TIBC   Date Value Ref Range Status   02/23/2023 203 (L) 250 - 450 ug/dL Final     Saturated Iron   Date Value Ref Range Status   02/23/2023 19 (L) 20 - 50 % Final     Ferritin   Date Value Ref Range Status   02/23/2023 1,803 (H) 20.0 - 300.0 ng/mL Final     Hemoglobin at goal

## 2023-03-07 NOTE — PT/OT/SLP EVAL
"Physical Therapy Evaluation and Discharge Note    Patient Name:  Cosme Lewis   MRN:  [id number]    Recommendations:     Discharge Recommendations:  (return to NH)  Discharge Equipment Recommendations: none   Barriers to discharge: None    Assessment:     Cosme Lewis is a 57 y.o. male admitted with a medical diagnosis of Acute on chronic respiratory failure with hypoxia. .  At this time, patient is functioning at their prior level of function and does not require further acute PT services.     Recent Surgery: * No surgery found *      Plan:     During this hospitalization, patient does not require further acute PT services.  Please re-consult if situation changes.      Subjective     "I'm hungry"    Pain/Comfort:  Pain Rating 1: 0/10  Pain Rating Post-Intervention 1: 0/10    Patients cultural, spiritual, Christian conflicts given the current situation: no    Living Environment:  Per pt, pt from NH  Prior to admission, patients level of function was assist with transfers to w/c.  Equipment used at home: walker, rolling, wheelchair.  DME owned (not currently used): none.  Upon discharge, patient will have assistance from NH staff.    Objective:     Communicated with RN prior to session.  Patient found up in chair with oxygen upon PT entry to room.    General Precautions: Standard, fall    Orthopedic Precautions:N/A   Braces: N/A  Respiratory Status: Nasal cannula, flow 3 L/min    Exams:  RLE/LLE ROM: Hamstring tightness present  RLE/LLE Strength: Hip flexion 3-/5, knee ext 3/5, ankle DF 3/5      Functional Mobility:  Transfers:     Sit to Stand:  minimum assistance with no AD    Declined transfer back to bed; became increasingly agitated throughout session.    AM-PAC 6 CLICK MOBILITY  Total Score:14       Treatment and Education:  Pt is at functional baseline with no acute needs. PT to sign off. Please reconsult if status changes.    Educated pt on PT role/POC  Educated pt on importance of OOB activity and daily " ambulation   Pt educated on proper body mechanics, safety techniques, and energy conservation with PT facilitation and cueing throughout session   Pt verbalized understanding      AM-PAC 6 CLICK MOBILITY  Total Score:14     Patient left up in chair with all lines intact, call button in reach, RN notified, and Rehab tech present.    GOALS:   Multidisciplinary Problems       Physical Therapy Goals       Not on file              Multidisciplinary Problems (Resolved)          Problem: Physical Therapy    Goal Priority Disciplines Outcome Goal Variances Interventions   Physical Therapy Goal   (Resolved)     PT, PT/OT Met     Description: Pt is at functional baseline with no acute needs. PT to sign off. Please reconsult if status changes.                           History:     Past Medical History:   Diagnosis Date    Chronic kidney disease-mineral and bone disorder 10/12/2022    COPD (chronic obstructive pulmonary disease)     Diabetes mellitus type 1     ESRD on dialysis     Hypertension     Hypervolemia 2/22/2023    Hyponatremia 3/4/2023    SOB (shortness of breath) 10/12/2022    Patient with history COPD treated with Ellipta the albuterol, fluticasone-salmeterol tachypneic in the ED saturating 94% and 6 L on nasal cannula, patient does not know how many  he uses it is in nursing home.    -duo nebs Q 4  Given that patient is a poor historian, and in the setting of left lower extremity edema and history of coagulopathy PE cannot be ruled out.  Will workup for possible infec       Past Surgical History:   Procedure Laterality Date    AV FISTULA PLACEMENT         Time Tracking:     PT Received On: 03/07/23  PT Start Time: 1119     PT Stop Time: 1126  PT Total Time (min): 7 min     Billable Minutes: Evaluation 7      03/07/2023

## 2023-03-07 NOTE — PLAN OF CARE
[facility] ([place] [id number])  Initial Discharge Assessment       Primary Care Provider: Primary Doctor No    Admission Diagnosis: Shortness of breath [R06.02]  Hypervolemia, unspecified hypervolemia type [E87.70]  Type 2 diabetes mellitus with hyperglycemia, with long-term current use of insulin [E11.65, Z79.4]    Admission Date: 3/4/2023  Expected Discharge Date: 3/7/2023    Discharge Barriers Identified: (P) None    Payor: MEDICARE / Plan: MEDICARE PART A & B / Product Type: Government /     Extended Emergency Contact Information  Primary Emergency Contact: Madi Lenowina  Mobile Phone: [phone]  Relation: Mother    Discharge Plan A: (P) Home with family  Discharge Plan B: (P) Home Health      [facility]  [address]  [facility] [address]  Phone: [phone] Fax: [phone]      Initial Assessment (most recent)       Adult Discharge Assessment - 03/07/23 1459          Discharge Assessment    Assessment Type Discharge Planning Assessment (P)      Confirmed/corrected address, phone number and insurance Yes (P)      Confirmed Demographics Correct on Facesheet (P)      Source of Information family (P)      Does patient/caregiver understand observation status Yes (P)      Communicated GERA with patient/caregiver Yes (P)      Reason For Admission shortness of breath (P)      People in Home child(orquidea), adult;child(orquidea), dependent (P)      Do you expect to return to your current living situation? Yes (P)      Do you have help at home or someone to help you manage your care at home? Yes (P)      Who are your caregiver(s) and their phone number(s)? mother Newton, [phone] (P)      Prior to hospitilization cognitive status: Alert/Oriented (P)      Current cognitive status: Alert/Oriented (P)      Walking or Climbing Stairs ambulation difficulty, requires equipment (P)      Equipment Currently Used at Home none (P)      Readmission within 30 days? No (P)      Patient  currently being followed by outpatient case management? No (P)      Do you currently have service(s) that help you manage your care at home? No (P)      Do you take prescription medications? Yes (P)      Do you have prescription coverage? Yes (P)      Coverage MEDICARE PART A &B (P)      Do you have any problems affording any of your prescribed medications? Yes (P)      Is the patient taking medications as prescribed? yes (P)      Who is going to help you get home at discharge? Kassandra Leon, mother, [phone] (P)      How do you get to doctors appointments? car, drives self;family or friend will provide (P)      Are you on dialysis? No (P)      Do you take coumadin? No (P)      Discharge Plan A Home with family (P)      Discharge Plan B Home Health (P)      DME Needed Upon Discharge  none (P)      Discharge Plan discussed with: Patient (P)      Discharge Barriers Identified None (P)                       Patient expected to dc back to [facility] when medically stable to dc .         Delmi Metz LMSW  [facility]   [id number]

## 2023-03-07 NOTE — ASSESSMENT & PLAN NOTE
Calcium   Date Value Ref Range Status   03/07/2023 8.2 (L) 8.7 - 10.5 mg/dL Final     Phosphorus   Date Value Ref Range Status   03/07/2023 3.3 2.7 - 4.5 mg/dL Final     Renal diet

## 2023-03-07 NOTE — PLAN OF CARE
Patient is stable to dc expected to dc back to [facility] possibly today.        Delmi Metz LMSW  [facility]   [id number]

## 2023-03-07 NOTE — DISCHARGE SUMMARY
[facility] - Intensive Care ([place] [address])  [facility] Medicine  Discharge Summary      Patient Name: Cosme Lewis  MRN: [id number]  MARCY: [id number]  Patient Class: IP- Inpatient  Admission Date: 3/4/2023  Hospital Length of Stay: 3 days  Discharge Date and Time: No discharge date for patient encounter.  Attending Physician: Dahlia Balbuena MD   Discharging Provider: Dahlia Balbuena MD  Primary Care Provider: Primary Doctor [facility] Medicine Team: [facility] N Dahlia Balbuena MD  Primary Care Team: [facility] N    HPI:   Cosme Lewis is a 57 y.o. with CKD on HD MWF, DM1, Renovascular HTN, Chronic respiratory failure, HFpEF, and Mild malnutrition who was BIBA to [facility] ED on 3/4/23 because of high BG noted at his NH ([facility]).  EMS got a POCT of >500 mg/dL.  They also noted him to be hypertensive and in respiratory distress on his usual 3L NC; SpO2 was 87%.  EMS placed him on 6 L and SpO2 improved to 90%.  He was immediately placed on BiPAP on arrival to [facility].  He was also given IV pushes of labetalol and hydralazine, to no avail.  He complained to ED staff of cough with grossly bloody sputum for three days and increased shortness of breath from his baseline.  MICU was consulted for admission given his BiPAP need and hypertensive urgency/emergency.        On our arrival he remained tachypneic and hypertensive to >200/110, and appeared tired and uncomfortable.  He says he was last dialyzed on 3/3 but is unsure of how much fluid was removed.  He denies abdominal pain but abdomen appeared distended.  He reports he vomited twice over the last three days while attempting to have a bowel movement, but also says he has had normal bowel movements in the meantime.  He denied recent cough on our questioning, and further denies fever, chills, headache, dizziness, and chest pain.       Admitted to MICU for further evaluation and treatment.    Interval History:  Got dialyzed overnight, now on 3 L NC (baseline). Temporarily  on a cardene gtt, which has been off since midnight and home anti-hypertensives have been resumed. Off insulin gtt, now on subcutaneous. Really just needs closer monitoring for BG and adjustments to home insulin regimen. If better controlled this afternoon, may just discharge from ICU.    He will go back to [facility].      * No surgery found *      Hospital Course:   3/7- transferred to [facility] med. /80  VSS.  K 5.3  HD is MWF. Will give lokema.  Pt wants to be discharged. Repeat lab. Will dc  to NH. HD planned tomorrow (WED) . Repeat K 4.8. has HD chair.  Will        Goals of Care Treatment Preferences:  Code Status: Full Code      Consults:   Consults (From admission, onward)        Status Ordering Provider     IP consult to case management  Once        Provider:  (Not yet assigned)    Completed GILLIAN LOVE     Inpatient consult to Nephrology  Once        Provider:  (Not yet assigned)    Completed FRANCISCO SAM     Inpatient consult to Critical Care Medicine  Once        Provider:  (Not yet assigned)    Completed MELIZA TA III          Pulmonary  * Acute on chronic respiratory failure with hypoxia  Patient with Hypoxic Respiratory failure which is Acute on chronic.  he is on home oxygen at 3 LPM. Supplemental oxygen was provided and noted- Oxygen Concentration (%):  [40] 40.   Signs/symptoms of respiratory failure include- tachypnea, increased work of breathing and respiratory distress. Contributing diagnoses includes - Fluid overload Labs and images were reviewed. Patient Has recent ABG, which has been reviewed. Will treat underlying causes and adjust management of respiratory failure as follows-      CXR Unchanged bilateral ground-glass airspace, suggestive of diffuse pulmonary edema in the setting of HFpEF and ESRD on HD.      S/p  BiPAP in ICU, for fluid overload and hypercapnia   Nephrology consulted for urgent HD   Was given Lasix 120 mg IV x 1, will extend as needed   Wean O2 as  tolerated   DuoNebs q4hrs   Continue home Breo inhaler    3/7- improved after HD, 95% on 3 liters, continue attempts to wean. Home Oxygen is 3 liters    Cardiac/Vascular  Chronic diastolic heart failure  Most Recent Echo 11/22/22:   The left ventricle is normal in size with mild concentric hypertrophy and increased density and normal systolic function.   The estimated ejection fraction is 63%.   Grade II left ventricular diastolic dysfunction.   Severe left atrial enlargement.   Mild right ventricular enlargement with low normal right ventricular systolic function.   There is right ventricular hypertrophy.   Moderate right atrial enlargement.   Mild to moderate tricuspid regurgitation.   Mild pulmonic regurgitation.   Elevated central venous pressure (15 mmHg).   The estimated PA systolic pressure is 67 mmHg.   There is moderate-severe pulmonary hypertension.   Trivial posterolateral pericardial effusion.   There is a moderate left pleural effusion.   There may be some ascites present.     On presentation: Troponin elevated at 0.028, BNP > 4500.      Nephrology consulted for urgent HD volume removal   Resuming home meds   Trend troponin   Lasix 120 mg IV x 1   Will monitor volume status and diurese further as warranted     3/5- with pulmonary edema, improved after HD    Hypertensive emergency  Patient has a current diagnosis of Hypertensive emergency with end organ damage evidenced by acute pulmonary edema without heart failure which is uncontrolled.  Latest blood pressure and vitals reviewed-   Temp:  [97.2 °F (36.2 °C)-98.1 °F (36.7 °C)]   Pulse:  [62-70]   Resp:  [16-21]   BP: (143-175)/(66-89)   SpO2:  [93 %-96 %] .   Patient currently on IV antihypertensives.   Home meds for hypertension were reviewed and noted below.   Hypertension Medications             carvediloL (COREG) 3.125 MG tablet Take 2 tablets (6.25 mg total) by mouth 2 (two) times daily.    cloNIDine 0.3 mg/24 hr td ptwk  (CATAPRES) 0.3 mg/24 hr Place 1 patch onto the skin every Saturday.    hydrALAZINE (APRESOLINE) 100 MG tablet Take 1 tablet (100 mg total) by mouth every 8 (eight) hours.    isosorbide mononitrate (IMDUR) 30 MG 24 hr tablet Take 30 mg by mouth 2 (two) times daily.    NIFEdipine (PROCARDIA-XL) 90 MG (OSM) 24 hr tablet Take 1 tablet (90 mg total) by mouth once daily.        Medication adjustment for hospital antihypertensives is as follows- per ICU  Will aim for controlled BP reduction by medications noted above. Monitor and mitigate end organ damage as indicated.  Elevated /110 Home medications: Clonidine patch 0.3mg, Coreg 6.25 mg BID, hydralazine 100mg q8hrs, Imdur 30 mg BID, Nifedipine 90 daily   S/p Cardene gtt in ICU   Nephrology consulted for HD for volume removal   Restart home blood pressure medications    3/7- BP improving, resolved      Renal/  Chronic kidney disease-mineral and bone disorder  See above ESRD      ESRD on dialysis  M/W/F schedule. Pt states he went to dialysis yesterday 3/3/23, and is unsure how much fluid was removed. BNP 4,520 on presentation.   Nephrology consulted for emergent dialysis   S/p Lasix 120 mg IV push upon admission    3/5- was dialyzed, HD scheduled on [facility]  3/7- has outpt chair, alexandre martinez, rechecking lab    Hematology  Multiple subsegmental pulmonary emboli without acute cor pulmonale  Hx of pulmonary thromboembolism within a lobar branch to the right lower lobe noting additional pulmonary thromboembolism within segmental branches to the right upper lobe in 10/2022  Continue home Eliquis.    Oncology  Anemia in ESRD (end-stage renal disease)  Hgb 11.1 on admission.  WCTM with Daily CBC and transfuse for Hgb < 7.    Endocrine  Type 2 diabetes mellitus with hyperglycemia, with long-term current use of insulin  Patient's FSGs are controlled on current medication regimen.  Last A1c reviewed-   Lab Results   Component Value Date    HGBA1C 9.5 (H) 03/06/2023      Most recent fingerstick glucose reviewed-   Recent Labs   Lab 03/06/23  2111 03/07/23  0817 03/07/23  1203 03/07/23  1646   POCTGLUCOSE 193* 211* 185* 175*     Current correctional scale  Medium  Maintain anti-hyperglycemic dose as follows-   Antihyperglycemics (From admission, onward)    Start     Stop Route Frequency Ordered    03/06/23 2100  insulin detemir U-100 pen 7 Units         -- SubQ 2 times daily 03/06/23 1631    03/06/23 1645  insulin aspart U-100 pen 5 Units         -- SubQ 3 times daily with meals 03/06/23 1631        Hold Oral hypoglycemics while patient is in the hospital.    Hyperglycemia  Glucose 691, Beta Hydroxybutyrate 0.2, anion gap 14, CO2 19. Not in DKA. Given 10 units of regular insulin IV in the ED. Home regimen Aspart 5 units TID with meals , Detemir 20 units in am, 15 units qhs.       S/p Insulin gtt per protocol in ICU   Hypoglycemia protocol   POCT     Recent Labs     03/06/23  1043 03/06/23  1630 03/06/23  2111 03/07/23  0817 03/07/23  1203 03/07/23  1646   POCTGLUCOSE 97 207* 193* 211* 185* 175*     detemir 7 bid and aspart 5 ac    GI  Elevated liver enzymes  Abdomen distended; unclear if he has ascites that is contributing to his current clinical picture.     US Abdomen   Daily liver function panel  3/5- may be hepatic cngestion                     Final Active Diagnoses:    Diagnosis Date Noted POA    PRINCIPAL PROBLEM:  Acute on chronic respiratory failure with hypoxia [J96.21] 10/12/2022 Yes    Hypertensive emergency [I16.1] 03/04/2023 Yes    Hyperglycemia [R73.9] 01/05/2023 Yes    Multiple subsegmental pulmonary emboli without acute cor pulmonale [I26.94] 10/13/2022 Yes    ESRD on dialysis [N18.6, Z99.2]  Not Applicable    Chronic diastolic heart failure [I50.32] 10/12/2022 Yes    Anemia in ESRD (end-stage renal disease) [N18.6, D63.1] 11/18/2022 Yes    Elevated liver enzymes [R74.8] 03/04/2023 Yes    Chronic kidney disease-mineral and bone disorder [N18.9,  E83.9, M89.9] 10/12/2022 Yes    Type 2 diabetes mellitus with hyperglycemia, with long-term current use of insulin [E11.65, Z79.4] 03/07/2023 Not Applicable      Problems Resolved During this Admission:    Diagnosis Date Noted Date Resolved POA    Hypervolemia [E87.70] 02/22/2023 03/06/2023 Yes    Hyponatremia [E87.1] 03/04/2023 03/06/2023 Yes       Discharged Condition: good    Disposition: Skilled Nursing Facility    Follow Up:   Follow-up Information     [facility] - Request Follow up.    Specialties: Nursing Home Agency, SNF Agency  Why: detention, Nursing Home  Contact information:  Joshua Regalado  [facility] [id number] [phone]                       Patient Instructions:   No discharge procedures on file.    Significant Diagnostic Studies: Labs:   CMP   Recent Labs   Lab 03/06/23  0156 03/07/23  0632 03/07/23  1530    134* 132*   K 3.9 5.3* 4.9    101 100   CO2 26 24 22*   GLU 61* 226* 114*   BUN 16 31* 34*   CREATININE 2.2* 3.6* 4.1*   CALCIUM 8.1* 8.2* 8.0*   PROT 5.7* 6.2  --    ALBUMIN 2.7* 2.8*  --    BILITOT 0.7 0.7  --    ALKPHOS 233* 252*  --    AST 47* 46*  --    ALT 46* 51*  --    ANIONGAP 6* 9 10    and CBC   Recent Labs   Lab 03/06/23 0156 03/07/23  0632   WBC 3.71* 3.10*   HGB 10.3* 10.6*   HCT 32.9* 34.0*    157       Pending Diagnostic Studies:     None         Medications:  Reconciled Home Medications:      Medication List      START taking these medications    insulin lispro 100 unit/mL injection  Commonly known as: HumaLOG U-100 Insulin  Inject 5 Units into the skin 3 (three) times daily before meals.        CHANGE how you take these medications    * cetirizine 5 MG tablet  Commonly known as: ZYRTEC  Take 1 tablet (5 mg total) by mouth daily as needed (itching).  What changed: You were already taking a medication with the same name, and this prescription was added. Make sure you understand how and when to take each.     * cetirizine 10 MG tablet  Commonly known  as: ZYRTEC  Take 10 mg by mouth daily as needed (itching).  What changed: Another medication with the same name was added. Make sure you understand how and when to take each.     * insulin detemir U-100 100 unit/mL (3 mL) Inpn pen  Commonly known as: Levemir FLEXTOUCH  Inject 20 Units into the skin every morning and 15 units in the evening.  What changed:   · how much to take  · how to take this  · when to take this  · additional instructions     * insulin detemir U-100 100 unit/mL (3 mL) Inpn pen  Commonly known as: Levemir FLEXTOUCH  Inject 7 Units into the skin 2 (two) times daily.  What changed: You were already taking a medication with the same name, and this prescription was added. Make sure you understand how and when to take each.         * This list has 4 medication(s) that are the same as other medications prescribed for you. Read the directions carefully, and ask your doctor or other care provider to review them with you.            CONTINUE taking these medications    acetaminophen 650 MG Tbsr  Commonly known as: TYLENOL  Take 650 mg by mouth every 8 (eight) hours as needed (pain or fever over 100.4).     albuterol 90 mcg/actuation inhaler  Commonly known as: PROVENTIL/VENTOLIN HFA  Inhale 2 puffs into the lungs every 6 (six) hours as needed for Wheezing or Shortness of Breath.     albuterol-ipratropium 2.5 mg-0.5 mg/3 mL nebulizer solution  Commonly known as: DUO-NEB  Take 3 mLs by nebulization every 4 (four) hours while awake. Rescue     apixaban 5 mg Tab  Commonly known as: ELIQUIS  Take 5 mg by mouth 2 (two) times daily.     aspirin 81 MG EC tablet  Commonly known as: ECOTRIN  Take 81 mg by mouth once daily.     atorvastatin 40 MG tablet  Commonly known as: LIPITOR  Take 1 tablet (40 mg total) by mouth once daily.     carvediloL 3.125 MG tablet  Commonly known as: COREG  Take 2 tablets (6.25 mg total) by mouth 2 (two) times daily.     cloNIDine 0.3 mg/24 hr td ptwk 0.3 mg/24 hr  Commonly known as:  CATAPRES  Place 1 patch onto the skin every Saturday.     erythromycin ophthalmic ointment  Commonly known as: ROMYCIN  Place a 1/2 inch ribbon of ointment into the lower eyelid three times a day.     FLUoxetine 40 MG capsule  Take 40 mg by mouth once daily.     fluticasone-salmeterol 250-50 mcg/dose 250-50 mcg/dose diskus inhaler  Commonly known as: ADVAIR  Inhale 1 puff into the lungs 2 (two) times daily.     GLUCAGON EMERGENCY KIT (HUMAN) 1 mg Solr  Generic drug: glucagon  1 mg into the muscle as needed if CBG < 70     hydrALAZINE 100 MG tablet  Commonly known as: APRESOLINE  Take 1 tablet (100 mg total) by mouth every 8 (eight) hours.     HYDROPHILIC CREAM TOP  Apply liberal amount to affected area twice daily for dry skin     insulin aspart U-100 100 unit/mL (3 mL) Inpn pen  Commonly known as: NovoLOG  Inject 5 Units into the skin 3 (three) times daily with meals. (Plus sliding scale)     isosorbide mononitrate 30 MG 24 hr tablet  Commonly known as: IMDUR  Take 30 mg by mouth 2 (two) times daily.     melatonin 3 mg Tbdl  Take 9 mg by mouth nightly as needed (sleep).     NEPRO CARB STEADY ORAL  Give 1 carton by mouth with each meal.     NIFEdipine 90 MG (OSM) 24 hr tablet  Commonly known as: PROCARDIA-XL  Take 1 tablet (90 mg total) by mouth once daily.     sodium chloride 0.65 % nasal spray  Commonly known as: OCEAN  2 sprays by Nasal route every 4 (four) hours as needed for Congestion.     tiotropium bromide 2.5 mcg/actuation inhaler  Commonly known as: SPIRIVA RESPIMAT  Inhale 2 puffs into the lungs Daily.     triamcinolone acetonide 0.025 % Lotn  Apply topically. Apply to the affected area twice daily     vitamin renal formula (B-complex-vitamin c-folic acid) 1 mg Cap  Commonly known as: NEPHROCAP  Take 1 capsule by mouth once daily.            Indwelling Lines/Drains at time of discharge:   Lines/Drains/Airways     Drain  Duration                Hemodialysis AV Fistula Left upper arm -- days                 Time spent on the discharge of patient: 35 minutes         Dahlia Balbuena MD  Department of Hospital Medicine  [facility] - Intensive Care ([place]-14)

## 2023-03-07 NOTE — ASSESSMENT & PLAN NOTE
Glucose 691, Beta Hydroxybutyrate 0.2, anion gap 14, CO2 19. Not in DKA. Given 10 units of regular insulin IV in the ED. Home regimen Aspart 5 units TID with meals , Detemir 20 units in am, 15 units qhs.       S/p Insulin gtt per protocol in ICU   Hypoglycemia protocol   POCT     Recent Labs     03/06/23  1043 03/06/23  1630 03/06/23  2111 03/07/23  0817 03/07/23  1203 03/07/23  1646   POCTGLUCOSE 97 207* 193* 211* 185* 175*     detemir 7 bid and aspart 5 ac

## 2023-03-07 NOTE — PROGRESS NOTES
[facility] - Intensive Care ([address] Medicine  Progress Note    Patient Name: Cosme Lewis  MRN: [id number]  Patient Class: IP- Inpatient   Admission Date: 3/4/2023  Length of Stay: 3 days  Attending Physician: Dahlia Balbuena MD  Primary Care Provider: Primary Doctor No        Subjective:     Principal Problem:Acute on chronic respiratory failure with hypoxia        HPI:  Cosme Lewis is a 57 y.o. with CKD on HD MWF, DM1, Renovascular HTN, Chronic respiratory failure, HFpEF, and Mild malnutrition who was BIBA to [facility] ED on 3/4/23 because of high BG noted at his NH ([facility]).  EMS got a POCT of >500 mg/dL.  They also noted him to be hypertensive and in respiratory distress on his usual 3L NC; SpO2 was 87%.  EMS placed him on 6 L and SpO2 improved to 90%.  He was immediately placed on BiPAP on arrival to [facility].  He was also given IV pushes of labetalol and hydralazine, to no avail.  He complained to ED staff of cough with grossly bloody sputum for three days and increased shortness of breath from his baseline.  MICU was consulted for admission given his BiPAP need and hypertensive urgency/emergency.        On our arrival he remained tachypneic and hypertensive to >200/110, and appeared tired and uncomfortable.  He says he was last dialyzed on 3/3 but is unsure of how much fluid was removed.  He denies abdominal pain but abdomen appeared distended.  He reports he vomited twice over the last three days while attempting to have a bowel movement, but also says he has had normal bowel movements in the meantime.  He denied recent cough on our questioning, and further denies fever, chills, headache, dizziness, and chest pain.       Admitted to MICU for further evaluation and treatment.    Interval History:  Got dialyzed overnight, now on 3 L NC (baseline). Temporarily on a cardene gtt, which has been off since midnight and home anti-hypertensives have been resumed. Off insulin gtt, now on subcutaneous. Really  just needs closer monitoring for BG and adjustments to home insulin regimen. If better controlled this afternoon, may just discharge from ICU.    He will go back to [facility].      Overview/Hospital Course:  3/7- transferred to [facility]. /80  VSS.  K 5.3  HD is MWF. Will give lokema.       Interval History: see above    Review of Systems   Constitutional:  Negative for chills and fever.   HENT:  Negative for trouble swallowing.    Respiratory:  Negative for shortness of breath.    Cardiovascular:  Negative for chest pain.   Gastrointestinal:  Negative for abdominal distention, constipation, diarrhea, nausea and vomiting.   Genitourinary:  Negative for difficulty urinating.   Musculoskeletal:  Negative for back pain and neck stiffness.   Psychiatric/Behavioral:  Negative for behavioral problems.    Objective:     Vital Signs (Most Recent):  Temp: 97.9 °F (36.6 °C) (03/07/23 0815)  Pulse: 66 (03/07/23 0815)  Resp: 18 (03/07/23 0815)  BP: (!) 154/80 (03/07/23 0815)  SpO2: 95 % (03/07/23 0815)   Vital Signs (24h Range):  Temp:  [97.2 °F (36.2 °C)-97.9 °F (36.6 °C)] 97.9 °F (36.6 °C)  Pulse:  [61-70] 66  Resp:  [10-20] 18  SpO2:  [93 %-100 %] 95 %  BP: (138-155)/(66-80) 154/80     Weight: 67.9 kg (149 lb 11.1 oz)  Body mass index is 22.11 kg/m².    Intake/Output Summary (Last 24 hours) at 3/7/2023 1022  Last data filed at 3/6/2023 1305  Gross per 24 hour   Intake 480 ml   Output --   Net 480 ml        Physical Exam  Constitutional:       General: He is not in acute distress.     Appearance: Normal appearance. He is ill-appearing. He is not toxic-appearing or diaphoretic.   HENT:      Head: Normocephalic and atraumatic.      Nose: Nose normal.      Mouth/Throat:      Mouth: Mucous membranes are moist.      Pharynx: Oropharynx is clear.   Eyes:      General: No scleral icterus.     Extraocular Movements: Extraocular movements intact.      Conjunctiva/sclera: Conjunctivae normal.      Pupils: Pupils are equal,  round, and reactive to light.   Cardiovascular:      Rate and Rhythm: Normal rate and regular rhythm.      Pulses: Normal pulses.      Heart sounds: Normal heart sounds.   Pulmonary:      Effort: Pulmonary effort is normal. No respiratory distress.      Breath sounds: Normal breath sounds. No stridor. No wheezing, rhonchi or rales.   Chest:      Chest wall: No tenderness.   Abdominal:      General: Abdomen is flat. Bowel sounds are normal. There is no distension.      Palpations: Abdomen is soft. There is no mass.      Tenderness: There is no abdominal tenderness. There is no right CVA tenderness, guarding or rebound.   Musculoskeletal:         General: No swelling, tenderness, deformity or signs of injury. Normal range of motion.      Cervical back: Normal range of motion and neck supple. No rigidity or tenderness.      Right lower leg: No edema.      Left lower leg: No edema.   Skin:     General: Skin is warm and dry.      Coloration: Skin is not jaundiced.      Findings: No erythema or rash.   Neurological:      General: No focal deficit present.      Mental Status: He is alert and oriented to person, place, and time. Mental status is at baseline.      Motor: No weakness.   Psychiatric:         Mood and Affect: Mood normal.         Behavior: Behavior normal.         Thought Content: Thought content normal.         Judgment: Judgment normal.       Significant Labs: All pertinent labs within the past 24 hours have been reviewed.  CBC:   Recent Labs   Lab 03/06/23  0156 03/07/23  0632   WBC 3.71* 3.10*   HGB 10.3* 10.6*   HCT 32.9* 34.0*    157       CMP:   Recent Labs   Lab 03/06/23  0156 03/07/23  0632    134*   K 3.9 5.3*    101   CO2 26 24   GLU 61* 226*   BUN 16 31*   CREATININE 2.2* 3.6*   CALCIUM 8.1* 8.2*   PROT 5.7* 6.2   ALBUMIN 2.7* 2.8*   BILITOT 0.7 0.7   ALKPHOS 233* 252*   AST 47* 46*   ALT 46* 51*   ANIONGAP 6* 9         Significant Imaging: I have reviewed all pertinent imaging  results/findings within the past 24 hours.      Assessment/Plan:      * Acute on chronic respiratory failure with hypoxia  Patient with Hypoxic Respiratory failure which is Acute on chronic.  he is on home oxygen at 3 LPM. Supplemental oxygen was provided and noted- Oxygen Concentration (%):  [40] 40.   Signs/symptoms of respiratory failure include- tachypnea, increased work of breathing and respiratory distress. Contributing diagnoses includes - Fluid overload Labs and images were reviewed. Patient Has recent ABG, which has been reviewed. Will treat underlying causes and adjust management of respiratory failure as follows-      CXR Unchanged bilateral ground-glass airspace, suggestive of diffuse pulmonary edema in the setting of HFpEF and ESRD on HD.      S/p  BiPAP in ICU, for fluid overload and hypercapnia   Nephrology consulted for urgent HD   Was given Lasix 120 mg IV x 1, will extend as needed   Wean O2 as tolerated   DuoNebs q4hrs   Continue home Breo inhaler    3/7- improved after HD, 95% on 3 liters, continue attempts to wean.    Hypertensive emergency  Patient has a current diagnosis of Hypertensive emergency with end organ damage evidenced by acute pulmonary edema without heart failure which is uncontrolled.  Latest blood pressure and vitals reviewed-   Temp:  [97.2 °F (36.2 °C)-97.9 °F (36.6 °C)]   Pulse:  [61-70]   Resp:  [10-20]   BP: (138-155)/(66-80)   SpO2:  [93 %-100 %] .   Patient currently on IV antihypertensives.   Home meds for hypertension were reviewed and noted below.   Hypertension Medications             carvediloL (COREG) 3.125 MG tablet Take 2 tablets (6.25 mg total) by mouth 2 (two) times daily.    cloNIDine 0.3 mg/24 hr td ptwk (CATAPRES) 0.3 mg/24 hr Place 1 patch onto the skin every Saturday.    hydrALAZINE (APRESOLINE) 100 MG tablet Take 1 tablet (100 mg total) by mouth every 8 (eight) hours.    isosorbide mononitrate (IMDUR) 30 MG 24 hr tablet Take 30 mg by mouth 2 (two)  times daily.    NIFEdipine (PROCARDIA-XL) 90 MG (OSM) 24 hr tablet Take 1 tablet (90 mg total) by mouth once daily.        Medication adjustment for hospital antihypertensives is as follows- per ICU  Will aim for controlled BP reduction by medications noted above. Monitor and mitigate end organ damage as indicated.  Elevated /110 Home medications: Clonidine patch 0.3mg, Coreg 6.25 mg BID, hydralazine 100mg q8hrs, Imdur 30 mg BID, Nifedipine 90 daily   S/p Cardene gtt in ICU   Nephrology consulted for HD for volume removal   Restart home blood pressure medications    3/7- BP improving      Hyperglycemia  Glucose 691, Beta Hydroxybutyrate 0.2, anion gap 14, CO2 19. Not in DKA. Given 10 units of regular insulin IV in the ED. Home regimen Aspart 5 units TID with meals , Detemir 20 units in am, 15 units qhs.       S/p Insulin gtt per protocol in ICU   Hypoglycemia protocol   POCT     Recent Labs     03/06/23  0833 03/06/23  0834 03/06/23  1043 03/06/23  1630 03/06/23  2111 03/07/23  0817   POCTGLUCOSE 257* 108 97 207* 193* 211*         Multiple subsegmental pulmonary emboli without acute cor pulmonale  Hx of pulmonary thromboembolism within a lobar branch to the right lower lobe noting additional pulmonary thromboembolism within segmental branches to the right upper lobe in 10/2022  Continue home Eliquis.    ESRD on dialysis  M/W/F schedule. Pt states he went to dialysis yesterday 3/3/23, and is unsure how much fluid was removed. BNP 4,520 on presentation.   Nephrology consulted for emergent dialysis   S/p Lasix 120 mg IV push upon admission    3/5- was dialyzed, HD scheduled on [facility]    Chronic diastolic heart failure  Most Recent Echo 11/22/22:   The left ventricle is normal in size with mild concentric hypertrophy and increased density and normal systolic function.   The estimated ejection fraction is 63%.   Grade II left ventricular diastolic dysfunction.   Severe left atrial enlargement.   Mild  right ventricular enlargement with low normal right ventricular systolic function.   There is right ventricular hypertrophy.   Moderate right atrial enlargement.   Mild to moderate tricuspid regurgitation.   Mild pulmonic regurgitation.   Elevated central venous pressure (15 mmHg).   The estimated PA systolic pressure is 67 mmHg.   There is moderate-severe pulmonary hypertension.   Trivial posterolateral pericardial effusion.   There is a moderate left pleural effusion.   There may be some ascites present.     On presentation: Troponin elevated at 0.028, BNP > 4500.      Nephrology consulted for urgent HD volume removal   Resuming home meds   Trend troponin   Lasix 120 mg IV x 1   Will monitor volume status and diurese further as warranted     3/5- with pulmonary edema, improved after HD    Anemia in ESRD (end-stage renal disease)  Hgb 11.1 on admission.  WCTM with Daily CBC and transfuse for Hgb < 7.    Elevated liver enzymes  Abdomen distended; unclear if he has ascites that is contributing to his current clinical picture.     US Abdomen   Daily liver function panel  3/5- may be hepatic cngestion                   Chronic kidney disease-mineral and bone disorder  See above ESRD        VTE Risk Mitigation (From admission, onward)         Ordered     apixaban tablet 5 mg  2 times daily         03/04/23 2017     IP VTE HIGH RISK PATIENT  Once         03/04/23 2010     Place sequential compression device  Until discontinued         03/04/23 2010                Discharge Planning   GERA: 3/10/2023     Code Status: Full Code   Is the patient medically ready for discharge?: No    Reason for patient still in hospital (select all that apply): Patient trending condition  Discharge Plan A: Return to nursing home          Dahlia Balbuena MD  Senior Hospitalist  Department of [facility] Medicine  [facility]  [id number], [phone]    [facility] - Intensive Care ([place])

## 2023-03-07 NOTE — PROGRESS NOTES
Nutrition-Related Diabetes Education      Time Spent: 10 minutes     Learners: Patient     Current HbA1c: 9.5    Is patient aware of their A1c and their goal A1c? Yes    Nutrition Education with handouts: MyPlate, CHO counting     Comments: Pt reports he isn't familiar with the diabetic diet. Provided and explained handout detailing sources of carbohydrates, appropriate serving sizes, and the plate method for meal planning. Pt voiced understanding. All questions and concerns answered.    Reports good appetite PTA/currently & UBW of 160#. Appears thin, however no indicators of malnutrition noted. Pt requesting double portions.     Barriers to Learning: None identified     Follow up: Yes    Please consult as needed.    Thank you!  MS Geena, RD, LDN

## 2023-03-07 NOTE — ASSESSMENT & PLAN NOTE
M/W/F schedule. Pt states he went to dialysis yesterday 3/3/23, and is unsure how much fluid was removed. BNP 4,520 on presentation.   Nephrology consulted for emergent dialysis   S/p Lasix 120 mg IV push upon admission    3/5- was dialyzed, HD scheduled on [facility]  3/7- has outpt chair, alexandre martinez, rechecking lab

## 2023-03-07 NOTE — NURSING
Received pt from ICU around 1800 on 3L nasal canula. Oriented to room and call bell. No s/s resp difficulty. Will continue with plan of care.

## 2023-03-07 NOTE — SUBJECTIVE & OBJECTIVE
Interval History: see above    Review of Systems   Constitutional:  Negative for chills and fever.   HENT:  Negative for trouble swallowing.    Respiratory:  Negative for shortness of breath.    Cardiovascular:  Negative for chest pain.   Gastrointestinal:  Negative for abdominal distention, constipation, diarrhea, nausea and vomiting.   Genitourinary:  Negative for difficulty urinating.   Musculoskeletal:  Negative for back pain and neck stiffness.   Psychiatric/Behavioral:  Negative for behavioral problems.    Objective:     Vital Signs (Most Recent):  Temp: 97.9 °F (36.6 °C) (03/07/23 0815)  Pulse: 66 (03/07/23 0815)  Resp: 18 (03/07/23 0815)  BP: (!) 154/80 (03/07/23 0815)  SpO2: 95 % (03/07/23 0815)   Vital Signs (24h Range):  Temp:  [97.2 °F (36.2 °C)-97.9 °F (36.6 °C)] 97.9 °F (36.6 °C)  Pulse:  [61-70] 66  Resp:  [10-20] 18  SpO2:  [93 %-100 %] 95 %  BP: (138-155)/(66-80) 154/80     Weight: 67.9 kg (149 lb 11.1 oz)  Body mass index is 22.11 kg/m².    Intake/Output Summary (Last 24 hours) at 3/7/2023 1022  Last data filed at 3/6/2023 1305  Gross per 24 hour   Intake 480 ml   Output --   Net 480 ml        Physical Exam  Constitutional:       General: He is not in acute distress.     Appearance: Normal appearance. He is ill-appearing. He is not toxic-appearing or diaphoretic.   HENT:      Head: Normocephalic and atraumatic.      Nose: Nose normal.      Mouth/Throat:      Mouth: Mucous membranes are moist.      Pharynx: Oropharynx is clear.   Eyes:      General: No scleral icterus.     Extraocular Movements: Extraocular movements intact.      Conjunctiva/sclera: Conjunctivae normal.      Pupils: Pupils are equal, round, and reactive to light.   Cardiovascular:      Rate and Rhythm: Normal rate and regular rhythm.      Pulses: Normal pulses.      Heart sounds: Normal heart sounds.   Pulmonary:      Effort: Pulmonary effort is normal. No respiratory distress.      Breath sounds: Normal breath sounds. No stridor.  No wheezing, rhonchi or rales.   Chest:      Chest wall: No tenderness.   Abdominal:      General: Abdomen is flat. Bowel sounds are normal. There is no distension.      Palpations: Abdomen is soft. There is no mass.      Tenderness: There is no abdominal tenderness. There is no right CVA tenderness, guarding or rebound.   Musculoskeletal:         General: No swelling, tenderness, deformity or signs of injury. Normal range of motion.      Cervical back: Normal range of motion and neck supple. No rigidity or tenderness.      Right lower leg: No edema.      Left lower leg: No edema.   Skin:     General: Skin is warm and dry.      Coloration: Skin is not jaundiced.      Findings: No erythema or rash.   Neurological:      General: No focal deficit present.      Mental Status: He is alert and oriented to person, place, and time. Mental status is at baseline.      Motor: No weakness.   Psychiatric:         Mood and Affect: Mood normal.         Behavior: Behavior normal.         Thought Content: Thought content normal.         Judgment: Judgment normal.       Significant Labs: All pertinent labs within the past 24 hours have been reviewed.  CBC:   Recent Labs   Lab 03/06/23  0156 03/07/23  0632   WBC 3.71* 3.10*   HGB 10.3* 10.6*   HCT 32.9* 34.0*    157       CMP:   Recent Labs   Lab 03/06/23  0156 03/07/23  0632    134*   K 3.9 5.3*    101   CO2 26 24   GLU 61* 226*   BUN 16 31*   CREATININE 2.2* 3.6*   CALCIUM 8.1* 8.2*   PROT 5.7* 6.2   ALBUMIN 2.7* 2.8*   BILITOT 0.7 0.7   ALKPHOS 233* 252*   AST 47* 46*   ALT 46* 51*   ANIONGAP 6* 9         Significant Imaging: I have reviewed all pertinent imaging results/findings within the past 24 hours.

## 2023-03-07 NOTE — PROGRESS NOTES
[facility] - Intensive Care ([place] [address])  Nephrology  Progress Note    Patient Name: Cosme Lewis  MRN: [id number]  Admission Date: 3/4/2023  Hospital Length of Stay: 3 days  Attending Provider: Dahlia Balbuena MD   Primary Care Physician: Primary Doctor No  Principal Problem:Acute on chronic respiratory failure with hypoxia    Subjective:     Interval History: HD completed on yesterday with 1 L removed. No distress on exam.     Review of patient's allergies indicates:  No Known Allergies  Current Facility-Administered Medications   Medication Frequency    0.9%  NaCl infusion Once    acetaminophen tablet 650 mg Q4H PRN    albuterol inhaler 2 puff Q6H PRN    apixaban tablet 5 mg BID    aspirin EC tablet 81 mg Daily    atorvastatin tablet 40 mg Daily    carvediloL tablet 6.25 mg BID    cetirizine tablet 5 mg Daily PRN    cloNIDine 0.3 mg/24 hr td ptwk 1 patch Every Sat    dextrose 10% bolus 125 mL 125 mL PRN    dextrose 10% bolus 250 mL 250 mL PRN    dextrose 40 % gel 15,000 mg PRN    dextrose 40 % gel 30,000 mg PRN    erythromycin 5 mg/gram (0.5 %) ophthalmic ointment Q8H    FLUoxetine capsule 40 mg Daily    fluticasone furoate-vilanteroL 100-25 mcg/dose diskus inhaler 1 puff Daily    hydrALAZINE tablet 100 mg Q8H    insulin aspart U-100 pen 5 Units TIDWM    insulin detemir U-100 pen 7 Units BID    isosorbide mononitrate 24 hr tablet 30 mg BID    melatonin tablet 9 mg Nightly PRN    mupirocin 2 % ointment BID    NIFEdipine 24 hr tablet 90 mg Daily    sodium chloride 0.65 % nasal spray 2 spray Q4H PRN    sodium chloride 0.9% flush 10 mL PRN    tiotropium bromide 2.5 mcg/actuation inhaler 2 puff Daily    vitamin renal formula (B-complex-vitamin c-folic acid) 1 mg per capsule 1 capsule Daily    white petrolatum 41 % ointment PRN       Objective:     Vital Signs (Most Recent):  Temp: 98.1 °F (36.7 °C) (03/07/23 1210)  Pulse: 65 (03/07/23 1210)  Resp: 18 (03/07/23 1210)  BP: (!) 154/79 (03/07/23  1210)  SpO2: 95 % (03/07/23 1126)   Vital Signs (24h Range):  Temp:  [97.2 °F (36.2 °C)-98.1 °F (36.7 °C)] 98.1 °F (36.7 °C)  Pulse:  [61-70] 65  Resp:  [10-20] 18  SpO2:  [93 %-98 %] 95 %  BP: (138-155)/(66-80) 154/79     Weight: 67.9 kg (149 lb 11.1 oz) (03/04/23 1437)  Body mass index is 22.11 kg/m².  Body surface area is 1.82 meters squared.    I/O last 3 completed shifts:  In: 970 [P.O.:720; Other:250]  Out: 2300 [Other:2300]    Physical Exam  Vitals and nursing note reviewed.   Constitutional:       General: He is not in acute distress.     Appearance: Normal appearance. He is not ill-appearing or toxic-appearing.   HENT:      Head: Normocephalic and atraumatic.      Nose: Nose normal.      Comments: 3 liter NC     Mouth/Throat:      Mouth: Mucous membranes are moist.   Eyes:      General: No scleral icterus.        Right eye: No discharge.         Left eye: No discharge.   Cardiovascular:      Rate and Rhythm: Normal rate.      Comments: Left AVF  Pulmonary:      Effort: Pulmonary effort is normal. No respiratory distress.      Breath sounds: No wheezing.   Abdominal:      General: Abdomen is flat.   Musculoskeletal:         General: No swelling. Normal range of motion.      Right lower leg: No edema.      Left lower leg: No edema.   Skin:     General: Skin is warm.      Capillary Refill: Capillary refill takes less than 2 seconds.      Coloration: Skin is not jaundiced.      Findings: No bruising or lesion.   Neurological:      General: No focal deficit present.      Mental Status: He is alert.       Significant Labs:  CBC:   Recent Labs   Lab 03/07/23  0632   WBC 3.10*   RBC 3.60*   HGB 10.6*   HCT 34.0*      MCV 94   MCH 29.4   MCHC 31.2*     CMP:   Recent Labs   Lab 03/07/23  0632   *   CALCIUM 8.2*   ALBUMIN 2.8*   PROT 6.2   *   K 5.3*   CO2 24      BUN 31*   CREATININE 3.6*   ALKPHOS 252*   ALT 51*   AST 46*   BILITOT 0.7     All labs within the past 24 hours have been  reviewed.     Assessment/Plan:     Pulmonary  * Acute on chronic respiratory failure with hypoxia  COPD 3 liters NC at baseline    -per primary    Renal/  Chronic kidney disease-mineral and bone disorder  Calcium   Date Value Ref Range Status   03/07/2023 8.2 (L) 8.7 - 10.5 mg/dL Final     Phosphorus   Date Value Ref Range Status   03/07/2023 3.3 2.7 - 4.5 mg/dL Final     Renal diet    ESRD on dialysis  ESRD on HD  Patient unsure of own prescription    iHD done for hypertensive emergency with increased oxygen requirement 3/4    Plan/Recommendation  -Continue MWF schedule while IP   -avoid gadolinium contrast  -renal diet, low K      Oncology  Anemia in ESRD (end-stage renal disease)  Goal hemoglobin in ESRD is 10-12  Hemoglobin   Date Value Ref Range Status   03/07/2023 10.6 (L) 14.0 - 18.0 g/dL Final     Iron   Date Value Ref Range Status   02/23/2023 39 (L) 45 - 160 ug/dL Final     Transferrin   Date Value Ref Range Status   02/23/2023 137 (L) 200 - 375 mg/dL Final     TIBC   Date Value Ref Range Status   02/23/2023 203 (L) 250 - 450 ug/dL Final     Saturated Iron   Date Value Ref Range Status   02/23/2023 19 (L) 20 - 50 % Final     Ferritin   Date Value Ref Range Status   02/23/2023 1,803 (H) 20.0 - 300.0 ng/mL Final     Hemoglobin at goal          Thank you for your consult. I will follow-up with patient. Please contact us if you have any additional questions.    Rosi Grant DNP, FNP-C  Nephrology  [facility] - Intensive Care ([place]-)

## 2023-03-07 NOTE — ASSESSMENT & PLAN NOTE
Patient's FSGs are controlled on current medication regimen.  Last A1c reviewed-   Lab Results   Component Value Date    HGBA1C 9.5 (H) 03/06/2023     Most recent fingerstick glucose reviewed-   Recent Labs   Lab 03/06/23  2111 03/07/23  0817 03/07/23  1203 03/07/23  1646   POCTGLUCOSE 193* 211* 185* 175*     Current correctional scale  Medium  Maintain anti-hyperglycemic dose as follows-   Antihyperglycemics (From admission, onward)    Start     Stop Route Frequency Ordered    03/06/23 2100  insulin detemir U-100 pen 7 Units         -- SubQ 2 times daily 03/06/23 1631    03/06/23 1645  insulin aspart U-100 pen 5 Units         -- SubQ 3 times daily with meals 03/06/23 1631        Hold Oral hypoglycemics while patient is in the hospital.

## 2023-03-07 NOTE — ASSESSMENT & PLAN NOTE
Glucose 691, Beta Hydroxybutyrate 0.2, anion gap 14, CO2 19. Not in DKA. Given 10 units of regular insulin IV in the ED. Home regimen Aspart 5 units TID with meals , Detemir 20 units in am, 15 units qhs.       S/p Insulin gtt per protocol in ICU   Hypoglycemia protocol   POCT     Recent Labs     03/06/23  0833 03/06/23  0834 03/06/23  1043 03/06/23  1630 03/06/23  2111 03/07/23  0817   POCTGLUCOSE 257* 108 97 207* 193* 211*     detemir 7 bid and aspart 5 ac

## 2023-03-07 NOTE — PLAN OF CARE
[facility] - [facility] ([place] [id number])  Facility Transfer Orders        Admit to: SNF    Diagnoses:   Active Hospital Problems    Diagnosis  POA    *Acute on chronic respiratory failure with hypoxia [J96.21]  Yes     Priority: 1 - High    Hypertensive emergency [I16.1]  Yes     Priority: 3     Hyperglycemia [R73.9]  Yes     Priority: 3     Multiple subsegmental pulmonary emboli without acute cor pulmonale [I26.94]  Yes     Priority: 4     ESRD on dialysis [N18.6, Z99.2]  Not Applicable     Priority: 4     Chronic diastolic heart failure [I50.32]  Yes     Priority: 5     Anemia in ESRD (end-stage renal disease) [N18.6, D63.1]  Yes     Priority: 8     Elevated liver enzymes [R74.8]  Yes     Priority: 9     Chronic kidney disease-mineral and bone disorder [N18.9, E83.9, M89.9]  Yes     Priority: 9       Resolved Hospital Problems    Diagnosis Date Resolved POA    Hypervolemia [E87.70] 03/06/2023 Yes     Priority: 6     Hyponatremia [E87.1] 03/06/2023 Yes     Priority: 10      Allergies: Review of patient's allergies indicates:  No Known Allergies    Code Status: FULL    Vitals: Routine       Diet: renal diet    Activity: Activity as tolerated    Nursing Precautions: Fall and Pressure ulcer prevention    Check BS ac and hs.     Bed/Surface: Low Air Loss    Consults: PT to evaluate and treat- 3 times a week and OT to evaluate and treat- 3 times a week    Oxygen: 3 liters/min via nasal cannula    Dialysis: Patient is on dialysis. MWF    Pending Diagnostic Studies:       Procedure Component Value Units Date/Time    Basic Metabolic Panel [[id number]]     Order Status: Sent Lab Status: No result     Specimen: Blood               Miscellaneous Care:   Routine Skin for Bedridden Patients:  Apply moisture barrier cream to all    IV Access: Dialysis Access     Medications: Discontinue all previous medication orders, if any. See new list below.  Current Discharge Medication List        START taking these medications    Details    insulin lispro (HUMALOG U-100 INSULIN) 100 unit/mL injection Inject 5 Units into the skin 3 (three) times daily before meals.  Qty: 4.5 mL, Refills: 11           CONTINUE these medications which have CHANGED    Details   !! cetirizine (ZYRTEC) 5 MG tablet Take 1 tablet (5 mg total) by mouth daily as needed (itching).  Qty: 30 tablet, Refills: 0      insulin aspart U-100 (NOVOLOG) 100 unit/mL (3 mL) InPn pen Inject 5 Units into the skin 3 (three) times daily with meals. (Plus sliding scale)  Qty: 15 mL, Refills: 3      !! insulin detemir U-100 (LEVEMIR FLEXTOUCH) 100 unit/mL (3 mL) SubQ InPn pen Inject 20 Units into the skin every morning and 15 units in the evening.  Qty: 12 mL, Refills: 3      !! insulin detemir U-100 (LEVEMIR FLEXTOUCH) 100 unit/mL (3 mL) SubQ InPn pen Inject 7 Units into the skin 2 (two) times daily.  Qty: 4.2 mL, Refills: 11       !! - Potential duplicate medications found. Please discuss with provider.        CONTINUE these medications which have NOT CHANGED    Details   acetaminophen (TYLENOL) 650 MG TbSR Take 650 mg by mouth every 8 (eight) hours as needed (pain or fever over 100.4).      albuterol (PROVENTIL/VENTOLIN HFA) 90 mcg/actuation inhaler Inhale 2 puffs into the lungs every 6 (six) hours as needed for Wheezing or Shortness of Breath.      albuterol-ipratropium (DUO-NEB) 2.5 mg-0.5 mg/3 mL nebulizer solution Take 3 mLs by nebulization every 4 (four) hours while awake. Rescue  Qty: 6480 mL, Refills: 0      apixaban (ELIQUIS) 5 mg Tab Take 5 mg by mouth 2 (two) times daily.      aspirin (ECOTRIN) 81 MG EC tablet Take 81 mg by mouth once daily.      atorvastatin (LIPITOR) 40 MG tablet Take 1 tablet (40 mg total) by mouth once daily.  Qty: 90 tablet, Refills: 3      carvediloL (COREG) 3.125 MG tablet Take 2 tablets (6.25 mg total) by mouth 2 (two) times daily.  Qty: 120 tablet, Refills: 11    Comments: .      !! cetirizine (ZYRTEC) 10 MG tablet Take 10 mg by mouth daily as needed  (itching).      cloNIDine 0.3 mg/24 hr td ptwk (CATAPRES) 0.3 mg/24 hr Place 1 patch onto the skin every Saturday.      erythromycin (ROMYCIN) ophthalmic ointment Place a 1/2 inch ribbon of ointment into the lower eyelid three times a day.  Qty: 3.5 g, Refills: 0      FLUoxetine 40 MG capsule Take 40 mg by mouth once daily.      fluticasone-salmeterol diskus inhaler 250-50 mcg Inhale 1 puff into the lungs 2 (two) times daily.      GLUCAGON EMERGENCY KIT, HUMAN, 1 mg injection 1 mg into the muscle as needed if CBG < 70      hydrALAZINE (APRESOLINE) 100 MG tablet Take 1 tablet (100 mg total) by mouth every 8 (eight) hours.    Comments: .      HYDROPHILIC CREAM TOP Apply liberal amount to affected area twice daily for dry skin      isosorbide mononitrate (IMDUR) 30 MG 24 hr tablet Take 30 mg by mouth 2 (two) times daily.      melatonin 3 mg TbDL Take 9 mg by mouth nightly as needed (sleep).      NIFEdipine (PROCARDIA-XL) 90 MG (OSM) 24 hr tablet Take 1 tablet (90 mg total) by mouth once daily.  Qty: 30 tablet, Refills: 11    Comments: .      nut.tx.imp.renal fxn,lac-reduc (NEPRO CARB STEADY ORAL) Give 1 carton by mouth with each meal.      sodium chloride (OCEAN) 0.65 % nasal spray 2 sprays by Nasal route every 4 (four) hours as needed for Congestion.      tiotropium bromide (SPIRIVA RESPIMAT) 2.5 mcg/actuation inhaler Inhale 2 puffs into the lungs Daily.      triamcinolone acetonide 0.025 % Lotn Apply topically. Apply to the affected area twice daily      vitamin renal formula, B-complex-vitamin c-folic acid, (NEPHROCAP) 1 mg Cap Take 1 capsule by mouth once daily.       !! - Potential duplicate medications found. Please discuss with provider.        Follow up:    Follow-up Information       [facility] - Request Follow up.    Specialties: Nursing Home Agency, SNF Agency  Why: care home, Nursing Home  Contact information:  [place] [address] [phone]                                Immunizations Administered as of 3/7/2023       Name Date Dose VIS Date Route Exp Date    COVID-19, MRNA, LN-S, PF (Moderna) 1/27/2022 -- -- -- --    COVID-19, MRNA, LN-S, PF (Pfizer) (Purple Cap) 3/2/2021 -- -- -- --    : Pf[employer] Inc     Lot: [id number]     COVID-19, MRNA, LN-S, PF (Pfizer) (Purple Cap) 2/9/2021 -- -- -- --    : Pfizer Inc     Lot: [id number]             This patient has had both covid vaccinations    Some patients may experience side effects after vaccination.  These may include fever, headache, muscle or joint aches.  Most symptoms resolve with 24-48 hours and do not require urgent medical evaluation unless they persist for more than 72 hours or symptoms are concerning for an unrelated medical condition.          Dahlia Balbuena MD

## 2023-03-07 NOTE — SUBJECTIVE & OBJECTIVE
Interval History: HD completed on yesterday with 1 L removed. No distress on exam.     Review of patient's allergies indicates:  No Known Allergies  Current Facility-Administered Medications   Medication Frequency    0.9%  NaCl infusion Once    acetaminophen tablet 650 mg Q4H PRN    albuterol inhaler 2 puff Q6H PRN    apixaban tablet 5 mg BID    aspirin EC tablet 81 mg Daily    atorvastatin tablet 40 mg Daily    carvediloL tablet 6.25 mg BID    cetirizine tablet 5 mg Daily PRN    cloNIDine 0.3 mg/24 hr td ptwk 1 patch Every Sat    dextrose 10% bolus 125 mL 125 mL PRN    dextrose 10% bolus 250 mL 250 mL PRN    dextrose 40 % gel 15,000 mg PRN    dextrose 40 % gel 30,000 mg PRN    erythromycin 5 mg/gram (0.5 %) ophthalmic ointment Q8H    FLUoxetine capsule 40 mg Daily    fluticasone furoate-vilanteroL 100-25 mcg/dose diskus inhaler 1 puff Daily    hydrALAZINE tablet 100 mg Q8H    insulin aspart U-100 pen 5 Units TIDWM    insulin detemir U-100 pen 7 Units BID    isosorbide mononitrate 24 hr tablet 30 mg BID    melatonin tablet 9 mg Nightly PRN    mupirocin 2 % ointment BID    NIFEdipine 24 hr tablet 90 mg Daily    sodium chloride 0.65 % nasal spray 2 spray Q4H PRN    sodium chloride 0.9% flush 10 mL PRN    tiotropium bromide 2.5 mcg/actuation inhaler 2 puff Daily    vitamin renal formula (B-complex-vitamin c-folic acid) 1 mg per capsule 1 capsule Daily    white petrolatum 41 % ointment PRN       Objective:     Vital Signs (Most Recent):  Temp: 98.1 °F (36.7 °C) (03/07/23 1210)  Pulse: 65 (03/07/23 1210)  Resp: 18 (03/07/23 1210)  BP: (!) 154/79 (03/07/23 1210)  SpO2: 95 % (03/07/23 1126)   Vital Signs (24h Range):  Temp:  [97.2 °F (36.2 °C)-98.1 °F (36.7 °C)] 98.1 °F (36.7 °C)  Pulse:  [61-70] 65  Resp:  [10-20] 18  SpO2:  [93 %-98 %] 95 %  BP: (138-155)/(66-80) 154/79     Weight: 67.9 kg (149 lb 11.1 oz) (03/04/23 1437)  Body mass index is 22.11 kg/m².  Body surface area is 1.82 meters squared.    I/O last 3 completed  shifts:  In: 970 [P.O.:720; Other:250]  Out: 2300 [Other:2300]    Physical Exam  Vitals and nursing note reviewed.   Constitutional:       General: He is not in acute distress.     Appearance: Normal appearance. He is not ill-appearing or toxic-appearing.   HENT:      Head: Normocephalic and atraumatic.      Nose: Nose normal.      Comments: 3 liter NC     Mouth/Throat:      Mouth: Mucous membranes are moist.   Eyes:      General: No scleral icterus.        Right eye: No discharge.         Left eye: No discharge.   Cardiovascular:      Rate and Rhythm: Normal rate.      Comments: Left AVF  Pulmonary:      Effort: Pulmonary effort is normal. No respiratory distress.      Breath sounds: No wheezing.   Abdominal:      General: Abdomen is flat.   Musculoskeletal:         General: No swelling. Normal range of motion.      Right lower leg: No edema.      Left lower leg: No edema.   Skin:     General: Skin is warm.      Capillary Refill: Capillary refill takes less than 2 seconds.      Coloration: Skin is not jaundiced.      Findings: No bruising or lesion.   Neurological:      General: No focal deficit present.      Mental Status: He is alert.       Significant Labs:  CBC:   Recent Labs   Lab 03/07/23  0632   WBC 3.10*   RBC 3.60*   HGB 10.6*   HCT 34.0*      MCV 94   MCH 29.4   MCHC 31.2*     CMP:   Recent Labs   Lab 03/07/23  0632   *   CALCIUM 8.2*   ALBUMIN 2.8*   PROT 6.2   *   K 5.3*   CO2 24      BUN 31*   CREATININE 3.6*   ALKPHOS 252*   ALT 51*   AST 46*   BILITOT 0.7     All labs within the past 24 hours have been reviewed.

## 2023-03-07 NOTE — ASSESSMENT & PLAN NOTE
Patient with Hypoxic Respiratory failure which is Acute on chronic.  he is on home oxygen at 3 LPM. Supplemental oxygen was provided and noted- Oxygen Concentration (%):  [40] 40.   Signs/symptoms of respiratory failure include- tachypnea, increased work of breathing and respiratory distress. Contributing diagnoses includes - Fluid overload Labs and images were reviewed. Patient Has recent ABG, which has been reviewed. Will treat underlying causes and adjust management of respiratory failure as follows-      CXR Unchanged bilateral ground-glass airspace, suggestive of diffuse pulmonary edema in the setting of HFpEF and ESRD on HD.      S/p  BiPAP in ICU, for fluid overload and hypercapnia   Nephrology consulted for urgent HD   Was given Lasix 120 mg IV x 1, will extend as needed   Wean O2 as tolerated   DuoNebs q4hrs   Continue home Breo inhaler    3/7- improved after HD, 95% on 3 liters, continue attempts to wean. Home Oxygen is 3 liters

## 2023-03-07 NOTE — NURSING
Pt going back to nursing home. Report called to Polly. All questions and concerns addressed. IV d/c.

## 2023-03-07 NOTE — ASSESSMENT & PLAN NOTE
M/W/F schedule. Pt states he went to dialysis yesterday 3/3/23, and is unsure how much fluid was removed. BNP 4,520 on presentation.   Nephrology consulted for emergent dialysis   S/p Lasix 120 mg IV push upon admission    3/5- was dialyzed, HD scheduled on [facility]  3/7- has outpt chair, alexandre martinez, rechecking lab

## 2023-03-07 NOTE — ASSESSMENT & PLAN NOTE
Patient has a current diagnosis of Hypertensive emergency with end organ damage evidenced by acute pulmonary edema without heart failure which is uncontrolled.  Latest blood pressure and vitals reviewed-   Temp:  [97.2 °F (36.2 °C)-97.9 °F (36.6 °C)]   Pulse:  [61-70]   Resp:  [10-20]   BP: (138-155)/(66-80)   SpO2:  [93 %-100 %] .   Patient currently on IV antihypertensives.   Home meds for hypertension were reviewed and noted below.   Hypertension Medications             carvediloL (COREG) 3.125 MG tablet Take 2 tablets (6.25 mg total) by mouth 2 (two) times daily.    cloNIDine 0.3 mg/24 hr td ptwk (CATAPRES) 0.3 mg/24 hr Place 1 patch onto the skin every Saturday.    hydrALAZINE (APRESOLINE) 100 MG tablet Take 1 tablet (100 mg total) by mouth every 8 (eight) hours.    isosorbide mononitrate (IMDUR) 30 MG 24 hr tablet Take 30 mg by mouth 2 (two) times daily.    NIFEdipine (PROCARDIA-XL) 90 MG (OSM) 24 hr tablet Take 1 tablet (90 mg total) by mouth once daily.        Medication adjustment for hospital antihypertensives is as follows- per ICU  Will aim for controlled BP reduction by medications noted above. Monitor and mitigate end organ damage as indicated.  Elevated /110 Home medications: Clonidine patch 0.3mg, Coreg 6.25 mg BID, hydralazine 100mg q8hrs, Imdur 30 mg BID, Nifedipine 90 daily   S/p Cardene gtt in ICU   Nephrology consulted for HD for volume removal   Restart home blood pressure medications    3/7- BP improving, resolved

## 2023-03-10 PROBLEM — H05.229 ORBITAL EDEMA: Status: ACTIVE | Noted: 2023-01-01

## 2023-03-10 PROBLEM — J96.11 CHRONIC RESPIRATORY FAILURE WITH HYPOXIA: Status: ACTIVE | Noted: 2022-10-12

## 2023-03-10 NOTE — ED PROVIDER NOTES
Encounter Date: 3/10/2023       History     Chief Complaint   Patient presents with    Nausea     Symptoms  started last night. Vomited today. Missed dialysis today. Swelling to right eye that started today. No chest pain.        57 CKD on HD MWF, DM1, Renovascular HTN, Chronic respiratory failure, HFpEF, and Mild malnutrition patient recently discharged from our intensive care unit for acute hypoxic respiratory failure in the setting of hypertensive urgency and pulmonary edema with missed dialysis.    Patient presents today with chief complaint of vomiting x1 episode.  He reports he missed dialysis today due to feeling ill.  He complains of swelling to his right eye.  He is not had worsening hypoxia from his baseline and is using a nasal cannula.  He reports he is had some abdominal pain but does not have significant abdominal distention.  His chief complaint at this time is hunger and he would like to eat.      Review of patient's allergies indicates:  No Known Allergies  Past Medical History:   Diagnosis Date    Chronic kidney disease-mineral and bone disorder 10/12/2022    COPD (chronic obstructive pulmonary disease)     Diabetes mellitus type 1     ESRD on dialysis     Hypertension     Hypervolemia 2/22/2023    Hyponatremia 3/4/2023    SOB (shortness of breath) 10/12/2022    Patient with history COPD treated with Ellipta the albuterol, fluticasone-salmeterol tachypneic in the ED saturating 94% and 6 L on nasal cannula, patient does not know how many  he uses it is in nursing home.    -duo nebs Q 4  Given that patient is a poor historian, and in the setting of left lower extremity edema and history of coagulopathy PE cannot be ruled out.  Will workup for possible infec     Past Surgical History:   Procedure Laterality Date    AV FISTULA PLACEMENT       Family History   Problem Relation Age of Onset    Diabetes Mother      Social History     Tobacco Use    Smoking status: Never    Smokeless tobacco: Never    Substance Use Topics    Alcohol use: Not Currently     Review of Systems  All other systems reviewed and negative except as noted in HPI    Physical Exam     Initial Vitals   BP Pulse Resp Temp SpO2   03/10/23 1401 03/10/23 1401 03/10/23 1401 03/10/23 1644 03/10/23 1401   (!) 140/64 60 18 98.2 °F (36.8 °C) 97 %      MAP       --                Physical Exam    Constitutional: He appears well-developed and well-nourished.   HENT:   Head: Normocephalic and atraumatic.   Eyes: Conjunctivae and EOM are normal. Pupils are equal, round, and reactive to light.   Significant edema to right upper eyelid   No proptosis  No pain on extraocular movements   Extraocular movements intact   No tenderness to palpation over right temporal artery.  No crepitus   No conjunctival injection    IOP:  OD 14 OS:  24   Neck: Neck supple.   Normal range of motion.  Cardiovascular:            Palpable thrill to the left upper extremity AV fistula.   Pulmonary/Chest: Breath sounds normal.   Abdominal: Abdomen is soft. Bowel sounds are normal. He exhibits no distension and no mass. There is no abdominal tenderness. There is no rebound and no guarding.   Musculoskeletal:         General: No tenderness or edema. Normal range of motion.      Cervical back: Normal range of motion and neck supple.     Neurological: He is alert and oriented to person, place, and time. GCS score is 15. GCS eye subscore is 4. GCS verbal subscore is 5. GCS motor subscore is 6.   Skin: Skin is warm and dry. Capillary refill takes less than 2 seconds.       ED Course   Procedures  Labs Reviewed   CBC W/ AUTO DIFFERENTIAL - Abnormal; Notable for the following components:       Result Value    WBC 3.03 (*)     RBC 3.80 (*)     Hemoglobin 11.1 (*)     Hematocrit 34.8 (*)     MCHC 31.9 (*)     RDW 15.1 (*)     Lymph # 0.5 (*)     Lymph % 14.9 (*)     All other components within normal limits   COMPREHENSIVE METABOLIC PANEL - Abnormal; Notable for the following components:     BUN 38 (*)     Creatinine 4.1 (*)     Albumin 3.0 (*)     Alkaline Phosphatase 261 (*)     eGFR 15.9 (*)     All other components within normal limits   ISTAT PROCEDURE - Abnormal; Notable for the following components:    POC BUN 36 (*)     POC Creatinine 4.3 (*)     POC Ionized Calcium 0.91 (*)     All other components within normal limits   CULTURE, BLOOD   CULTURE, BLOOD   LIPASE   LACTIC ACID, PLASMA   URINALYSIS, REFLEX TO URINE CULTURE   PROCALCITONIN   ISTAT CHEM8   POCT GLUCOSE MONITORING CONTINUOUS     EKG Readings: (Independently Interpreted)   Initial Reading: No STEMI. Previous EKG: Compared with most recent EKG Rhythm: Sinus Bradycardia.     Imaging Results              CT Abdomen Pelvis  Without Contrast (Final result)  Result time 03/10/23 16:41:23      Final result by Jj Kahn MD (03/10/23 16:41:23)                   Impression:      1. Cardiomegaly with bibasilar pleural effusions with associated atelectasis, patchy infiltrate and pulmonary edema.  Recommend follow-up.  2. Hepatomegaly.  3. Gallbladder sludge with no evidence of acute cholecystitis.  4. Small fat containing umbilical hernia.  5. Possible mild small bowel wall thickening may be associated with enteritis.  6. Diffuse ascites.  7. Diffuse edema in the subcutaneous tissues, mesentery and soft tissues may be associated with anasarca.      Electronically signed by: Jj Kahn  Date:    03/10/2023  Time:    16:41               Narrative:    EXAMINATION:  CT ABDOMEN PELVIS WITHOUT CONTRAST    CLINICAL HISTORY:  Nausea/vomiting;Abdominal pain, acute, nonlocalized;    TECHNIQUE:  Low dose axial images, sagittal and coronal reformations were obtained from the lung bases to the pubic symphysis, Oral contrast was not administered.    COMPARISON:  02/22/2023    FINDINGS:  Heart: The heart is enlarged.    Lung Bases: Bibasilar small pleural effusions with associated atelectasis or mild infiltrate.  Diffuse ground-glass changes may be  associated with pulmonary edema.    Liver: The liver is enlarged with no focal abnormality.    Gallbladder: Possible gallbladder sludge.  No evidence of acute cholecystitis.    Bile Ducts: No evidence of dilated ducts.    Pancreas: No mass or peripancreatic fat stranding.    Spleen: Unremarkable.    Adrenals: Unremarkable.    Kidneys/ Ureters: Unremarkable.    Bladder: No evidence of wall thickening.    Reproductive organs: Unremarkable.    GI Tract/Mesentery: No evidence of bowel obstruction.  Possible mild small bowel wall thickening may be associated with enteritis.    Peritoneal Space: There is diffuse ascites.  No free air.    Retroperitoneum: No significant adenopathy.    Abdominal wall: Small fat containing umbilical hernia.    Vasculature: No significant atherosclerosis or aneurysm.    Bones: No acute fracture.    There is diffuse edema in the subcutaneous tissues, mesentery and soft tissues may be associated with anasarca.                                       CT ORBITS WITHOUT CONTRAST (Final result)  Result time 03/10/23 15:57:24      Final result by Deep Rubio MD (03/10/23 15:57:24)                   Impression:      Diffuse infiltration and edema throughout the fat of the visualized face including the orbits.  No acute facial fracture.  The paranasal sinuses are clear.  It is unclear whether this reflects cellulitis or anasarca..    Please see details above.      Electronically signed by: Deep Rubio  Date:    03/10/2023  Time:    15:57               Narrative:    EXAMINATION:  CT ORBITS WITHOUT CONTRAST    CLINICAL HISTORY:  Orbital asymmetry;Orbital cellulitis suspected;    TECHNIQUE:  Noncontrast CT imaging of the orbits was performed with sagittal and coronal reformats.3D reformats were created on an independent workstation to evaluate the underlying osseous elements.    COMPARISON:  02/10/2023    FINDINGS:  There is infiltration of the fat and fascial planes throughout the face including  the orbits bilaterally and right greater than left eyelids.  The superior ophthalmic veins are prominent in size but this can also be within normal variation.    No acute orbital fracture.  The underlying paranasal sinuses and mastoid air cells are clear.    The cavernous sinuses are not adequately evaluated due to the lack of intravenous contrast.  Prominent atherosclerotic vascular calcifications at the skull base.    Intracranially there is volume loss with a chronic deep white matter infarct on the right again noted.                                    X-Rays:   Independently Interpreted Readings:   Abdomen:   Abdomen and Pelvis without Contrast - No acute obstruction, infection, or other acute process to explain patient's symptoms.  However, anasarca noted.   Other Readings:  CT orbit: Swelling to right eyelid noted  Medications   sodium chloride 0.9% flush 10 mL (has no administration in time range)   melatonin tablet 6 mg (has no administration in time range)   sodium chloride 0.9% flush 10 mL (has no administration in time range)   naloxone 0.4 mg/mL injection 0.02 mg (has no administration in time range)   glucose chewable tablet 16 g (has no administration in time range)   glucose chewable tablet 24 g (has no administration in time range)   glucagon (human recombinant) injection 1 mg (has no administration in time range)   albuterol-ipratropium 2.5 mg-0.5 mg/3 mL nebulizer solution 3 mL (has no administration in time range)   apixaban tablet 5 mg (has no administration in time range)   atorvastatin tablet 40 mg (has no administration in time range)   aspirin EC tablet 81 mg (has no administration in time range)   carvediloL tablet 6.25 mg (has no administration in time range)   cetirizine tablet 5 mg (has no administration in time range)   FLUoxetine capsule 40 mg (has no administration in time range)   isosorbide mononitrate 24 hr tablet 30 mg (has no administration in time range)   NIFEdipine 24 hr tablet  90 mg (has no administration in time range)   tiotropium bromide 2.5 mcg/actuation inhaler 2 puff (has no administration in time range)   insulin aspart U-100 pen 1-10 Units (has no administration in time range)   dextrose 10% bolus 125 mL 125 mL (has no administration in time range)   dextrose 10% bolus 250 mL 250 mL (has no administration in time range)     Medical Decision Making:   History:   I obtained history from: EMS provider.       <> Summary of History: Patient has right eyelid swelling   He had 1 episode of emesis   He missed  Old Medical Records: I decided to obtain old medical records.  Old Records Summarized: records from clinic visits and records from previous admission(s).       <> Summary of Records: Patient admitted for acute hypoxic respiratory failure and missed dialysis with hypertensive urgency requiring Cardene drip.  Discharge to skilled nursing Facility on March 7, 2020  Initial Assessment:   Patient does not appear significantly volume overloaded does not have respiratory distress has with last time.  Additionally, his blood pressures improved from previous   Eyelid swelling noted but the eyes unremarkable.  He is not having evidence of retrobulbar hematoma or other retrobulbar process.  Will obtain CT orbit.  Will obtain CT of abdomen given reported vomiting and abdominal pain.  These symptoms have since resolved the patient complains of hunger at this time.    Independently Interpreted Test(s):   I have ordered and independently interpreted X-rays - see prior notes.  I have ordered and independently interpreted EKG Reading(s) - see prior notes  Clinical Tests:   Lab Tests: Ordered and Reviewed  Radiological Study: Ordered and Reviewed  Medical Tests: Ordered and Reviewed  ED Management:  See ED course for additional HPI, ROS, PE, lab, imaging, consultant discussion, procedure interpretation, and Medical Decision Making.    Discussed with Ophthalmology Internal Medicine.  Given the patient  has a lack of ocular symptoms and his symptoms seem to be limited to his eyelid, will plan for admission for hemodialysis and monitoring of respiratory status, blood pressure, and vision changes and ophthalmology evaluation in the morning.  Other:   I have discussed this case with another health care provider.  Discussed with Ophthalmology on-call who evaluated patient at bedside.  Agree the patient has no acute retro-orbital process.  Presentation at this time is consistent with anasarca independent edema based on the patient's chronic positioning and his underlying renal disease.  Continue with plan for admission.           ED Course as of 03/10/23 2044   Fri Mar 10, 2023   1534 Ecg w/o STEMI. C/w sinus bradycardia w/ 1st degree AV block [DS]   1609 CBC without significant change from previous.  Point of care potassium is 4.4. [DS]   1625 CT read as diffuse infiltration of periorbital fat on the right side.  Not consistent with orbital cellulitis or orbital hematoma.  There are no bony fractures.  Patient does have some prominent ophthalmic veins.  However, he has no headache to suggest central venous thrombosis and he is anticoagulated with apixaban as well as having dysfunctional platelets in the setting of renal failure.  Will discuss with ophthalmology [DS]   1626 Considered endophthalmitis but patient has no evidence of inflammation to the eye. [DS]      ED Course User Index  [DS] Henri Perkins MD                 Clinical Impression:   Final diagnoses:  [N19] Renal failure  [N18.6] ESRD (end stage renal disease) (Primary)  [R60.0] Periorbital edema  [N04.9] Anasarca associated with disorder of kidney        ED Disposition Condition    Observation Stable                Henri Perkins MD  03/10/23 2044

## 2023-03-10 NOTE — ED NOTES
LOC: The patient is awake, alert and aware of environment with an appropriate affect, the patient is oriented x 3 and speaking appropriately.  APPEARANCE: Patient resting comfortably and in no acute distress, patient is clean and well groomed, patient's clothing is properly fastened.  SKIN: The skin is warm and dry, color consistent with ethnicity  MUSCULOSKELETAL:  no obvious swelling or deformities noted.  RESPIRATORY: Airway is open and patent, respirations are spontaneous, patient has a normal effort and rate  ABDOMEN: Soft and non tender to palpation, no distention noted    Patient arrives via ems from [facility], patient complains of nausea and vomiting x1 day. Patient receives dialysis on M, W, F and is due for dialysis today. Patient states he also woke up this am with a swollen right  eye. Patient denies any trauma or injury. Patient arrives on 2L o2 via NC, patient normally wears 2L o2 at home.Patient is in no acute distress, will continue to monitor

## 2023-03-10 NOTE — ED NOTES
I-STAT Chem-8+ Results:   Value Reference Range   Sodium 136 136-145 mmol/L   Potassium  4.4 3.5-5.1 mmol/L   Chloride 99  mmol/L   Ionized Calcium 0.91 1.06-1.42 mmol/L   CO2 (measured) 29 23-29 mmol/L   Glucose 83  mg/dL   BUN 36 6-30 mg/dL   Creatinine 4.3 0.5-1.4 mg/dL   Hematocrit 37 36-54%

## 2023-03-11 PROBLEM — I77.0 AVF (ARTERIOVENOUS FISTULA): Status: ACTIVE | Noted: 2023-01-01

## 2023-03-11 PROBLEM — E11.65 TYPE 2 DIABETES MELLITUS WITH HYPERGLYCEMIA, WITH LONG-TERM CURRENT USE OF INSULIN: Chronic | Status: ACTIVE | Noted: 2023-01-01

## 2023-03-11 PROBLEM — Z79.4 TYPE 2 DIABETES MELLITUS WITH HYPERGLYCEMIA, WITH LONG-TERM CURRENT USE OF INSULIN: Chronic | Status: ACTIVE | Noted: 2023-01-01

## 2023-03-11 PROBLEM — I26.94 MULTIPLE SUBSEGMENTAL PULMONARY EMBOLI WITHOUT ACUTE COR PULMONALE: Chronic | Status: ACTIVE | Noted: 2022-10-13

## 2023-03-11 PROBLEM — I50.32 CHRONIC DIASTOLIC HEART FAILURE: Chronic | Status: ACTIVE | Noted: 2022-10-12

## 2023-03-11 PROBLEM — D72.819 LEUKOPENIA: Status: ACTIVE | Noted: 2023-01-01

## 2023-03-11 PROBLEM — N18.6 ANEMIA IN ESRD (END-STAGE RENAL DISEASE): Chronic | Status: ACTIVE | Noted: 2022-01-01

## 2023-03-11 PROBLEM — J96.11 CHRONIC RESPIRATORY FAILURE WITH HYPOXIA: Chronic | Status: ACTIVE | Noted: 2022-10-12

## 2023-03-11 PROBLEM — E78.5 HLD (HYPERLIPIDEMIA): Chronic | Status: ACTIVE | Noted: 2022-01-01

## 2023-03-11 PROBLEM — I15.0 RENOVASCULAR HYPERTENSION: Chronic | Status: ACTIVE | Noted: 2022-01-01

## 2023-03-11 PROBLEM — D63.1 ANEMIA IN ESRD (END-STAGE RENAL DISEASE): Chronic | Status: ACTIVE | Noted: 2022-01-01

## 2023-03-11 NOTE — ASSESSMENT & PLAN NOTE
AVF to LUE oozing blood from needle sites, significantly enlarged/buldging, and with distal arm edema    - vascular surgery consulted, appreciate assistance   - f/u AVF US   - hold eliquis   - perform HD using AVF

## 2023-03-11 NOTE — HPI
Cosme Lewis is a 57 y.o. with CKD on HD MWF, DM1, Renovascular HTN, Chronic respiratory failure (on 3L of home O2), HFpEF, and Mild malnutrition who presents to the ED for vomiting and eye swelling. He began feeling nauseated with SOB, LE swelling, abdominal swelling, and facial swelling 3 days ago. He reports blurry vision associated with his eye swelling and discomfort. He has also had dizziness since the onset of symptoms. He reports vomiting twice today but denies blood in his vomit. Denies fever, chills, diarrhea, cough, dysuria, and chest pain. He still produces urine and his last complete session of HD was on 3/8 as he missed HD today due to feeling ill.       Of note, patient recently hospitalized in the MICU here and discharged on 3/7 to [facility] for hypertensive emergency and acute hypoxic respiratory failure.    In the ED, patient arrived afebrile and HDS satting well on RA. Labs remarkable for anemia 11 at baseline, elevated alk phos 261, normal lactate, and normal lipase. CT of the orbits demonstrated diffuse facial edema. CT A/P demonstrated pleural effusions, pulmonary edema, anasarca, and ascites.     Patient admitted to hospital medicine.

## 2023-03-11 NOTE — ASSESSMENT & PLAN NOTE
Lab Results   Component Value Date    HGBA1C 9.5 (H) 03/06/2023     Recent Labs   Lab 03/11/23  0747 03/11/23  1134   POCTGLUCOSE 338* 283*     Current correctional scale  Medium  Maintain anti-hyperglycemic dose as follows-   Antihyperglycemics (From admission, onward)    Start     Stop Route Frequency Ordered    03/11/23 2100  insulin detemir U-100 pen 5 Units         -- SubQ 2 times daily 03/11/23 1457    03/11/23 1645  insulin aspart U-100 pen 4 Units         -- SubQ 3 times daily with meals 03/11/23 1457    03/10/23 1954  insulin aspart U-100 pen 1-10 Units         -- SubQ Before meals & nightly PRN 03/10/23 1854        Hold Oral hypoglycemics while patient is in the hospital.  [facility]

## 2023-03-11 NOTE — H&P
[facility] - Emergency Dept  [facility] Medicine  History & Physical    Patient Name: Cosme Lewis  MRN: [id number]  Patient Class: OP- Observation  Admission Date: 3/10/2023  Attending Physician: Odessa Borges DO   Primary Care Provider: Primary Doctor No         Patient information was obtained from patient, past medical records and ER records.     Subjective:     Principal Problem:<principal problem not specified>    Chief Complaint:   Chief Complaint   Patient presents with    Nausea     Symptoms  started last night. Vomited today. Missed dialysis today. Swelling to right eye that started today. No chest pain.           HPI: Cosme Lewis is a 57 y.o. with CKD on HD MWF, DM1, Renovascular HTN, Chronic respiratory failure (on 3L of home O2), HFpEF, and Mild malnutrition who presents to the ED for vomiting and eye swelling. He began feeling nauseated with SOB, LE swelling, abdominal swelling, and facial swelling 3 days ago. He reports blurry vision associated with his eye swelling and discomfort. He has also had dizziness since the onset of symptoms. He reports vomiting twice today but denies blood in his vomit. Denies fever, chills, diarrhea, cough, dysuria, and chest pain. He still produces urine and his last complete session of HD was on 3/8 as he missed HD today due to feeling ill.       Of note, patient recently hospitalized in the MICU here and discharged on 3/7 to [facility] for hypertensive emergency and acute hypoxic respiratory failure.    In the ED, patient arrived afebrile and HDS satting well on RA. Labs remarkable for anemia 11 at baseline, elevated alk phos 261, normal lactate, and normal lipase. CT of the orbits demonstrated diffuse facial edema. CT A/P demonstrated pleural effusions, pulmonary edema, anasarca, and ascites.     Patient admitted to hospital medicine.       Past Medical History:   Diagnosis Date    Chronic kidney disease-mineral and bone disorder 10/12/2022    COPD (chronic obstructive  pulmonary disease)     Diabetes mellitus type 1     ESRD on dialysis     Hypertension     Hypervolemia 2/22/2023    Hyponatremia 3/4/2023    SOB (shortness of breath) 10/12/2022    Patient with history COPD treated with Ellipta the albuterol, fluticasone-salmeterol tachypneic in the ED saturating 94% and 6 L on nasal cannula, patient does not know how many  he uses it is in nursing home.    -duo nebs Q 4  Given that patient is a poor historian, and in the setting of left lower extremity edema and history of coagulopathy PE cannot be ruled out.  Will workup for possible infec       Past Surgical History:   Procedure Laterality Date    AV FISTULA PLACEMENT         Review of patient's allergies indicates:  No Known Allergies    No current facility-administered medications on file prior to encounter.     Current Outpatient Medications on File Prior to Encounter   Medication Sig    acetaminophen (TYLENOL) 650 MG TbSR Take 650 mg by mouth every 8 (eight) hours as needed (pain or fever over 100.4).    albuterol (PROVENTIL/VENTOLIN HFA) 90 mcg/actuation inhaler Inhale 2 puffs into the lungs every 6 (six) hours as needed for Wheezing or Shortness of Breath.    albuterol-ipratropium (DUO-NEB) 2.5 mg-0.5 mg/3 mL nebulizer solution Take 3 mLs by nebulization every 4 (four) hours while awake. Rescue    apixaban (ELIQUIS) 5 mg Tab Take 5 mg by mouth 2 (two) times daily.    aspirin (ECOTRIN) 81 MG EC tablet Take 81 mg by mouth once daily.    atorvastatin (LIPITOR) 40 MG tablet Take 1 tablet (40 mg total) by mouth once daily.    carvediloL (COREG) 3.125 MG tablet Take 2 tablets (6.25 mg total) by mouth 2 (two) times daily.    cetirizine (ZYRTEC) 10 MG tablet Take 10 mg by mouth daily as needed (itching).    cetirizine (ZYRTEC) 5 MG tablet Take 1 tablet (5 mg total) by mouth daily as needed (itching).    cloNIDine 0.3 mg/24 hr td ptwk (CATAPRES) 0.3 mg/24 hr Place 1 patch onto the skin every Saturday.    erythromycin (ROMYCIN)  ophthalmic ointment Place a 1/2 inch ribbon of ointment into the lower eyelid three times a day.    FLUoxetine 40 MG capsule Take 40 mg by mouth once daily.    fluticasone-salmeterol diskus inhaler 250-50 mcg Inhale 1 puff into the lungs 2 (two) times daily.    GLUCAGON EMERGENCY KIT, HUMAN, 1 mg injection 1 mg into the muscle as needed if CBG < 70    hydrALAZINE (APRESOLINE) 100 MG tablet Take 1 tablet (100 mg total) by mouth every 8 (eight) hours.    HYDROPHILIC CREAM TOP Apply liberal amount to affected area twice daily for dry skin    insulin aspart U-100 (NOVOLOG) 100 unit/mL (3 mL) InPn pen Inject 5 Units into the skin 3 (three) times daily with meals. (Plus sliding scale)    insulin detemir U-100 (LEVEMIR FLEXTOUCH) 100 unit/mL (3 mL) SubQ InPn pen Inject 20 Units into the skin every morning and 15 units in the evening.    insulin detemir U-100 (LEVEMIR FLEXTOUCH) 100 unit/mL (3 mL) SubQ InPn pen Inject 7 Units into the skin 2 (two) times daily.    insulin lispro (HUMALOG U-100 INSULIN) 100 unit/mL injection Inject 5 Units into the skin 3 (three) times daily before meals.    isosorbide mononitrate (IMDUR) 30 MG 24 hr tablet Take 30 mg by mouth 2 (two) times daily.    melatonin 3 mg TbDL Take 9 mg by mouth nightly as needed (sleep).    NIFEdipine (PROCARDIA-XL) 90 MG (OSM) 24 hr tablet Take 1 tablet (90 mg total) by mouth once daily.    nut.tx.imp.renal fxn,lac-reduc (NEPRO CARB STEADY ORAL) Give 1 carton by mouth with each meal.    sodium chloride (OCEAN) 0.65 % nasal spray 2 sprays by Nasal route every 4 (four) hours as needed for Congestion.    tiotropium bromide (SPIRIVA RESPIMAT) 2.5 mcg/actuation inhaler Inhale 2 puffs into the lungs Daily.    triamcinolone acetonide 0.025 % Lotn Apply topically. Apply to the affected area twice daily    vitamin renal formula, B-complex-vitamin c-folic acid, (NEPHROCAP) 1 mg Cap Take 1 capsule by mouth once daily.     Family History       Problem Relation (Age of Onset)     Diabetes Mother          Tobacco Use    Smoking status: Never    Smokeless tobacco: Never   Substance and Sexual Activity    Alcohol use: Not Currently    Drug use: Not on file    Sexual activity: Not Currently     Review of Systems   Constitutional:  Negative for chills and fever.   HENT:  Negative for rhinorrhea and sore throat.    Eyes:  Positive for visual disturbance.   Respiratory:  Positive for shortness of breath. Negative for cough.    Cardiovascular:  Negative for chest pain.   Gastrointestinal:  Positive for nausea and vomiting. Negative for abdominal pain, blood in stool and diarrhea.   Genitourinary:  Negative for dysuria and hematuria.   Neurological:  Positive for dizziness. Negative for light-headedness.   Psychiatric/Behavioral:  Negative for confusion and decreased concentration.    Objective:     Vital Signs (Most Recent):  Temp: 98.2 °F (36.8 °C) (03/10/23 1644)  Pulse: 60 (03/10/23 1401)  Resp: 18 (03/10/23 1401)  BP: (!) 140/64 (03/10/23 1401)  SpO2: 97 % (03/10/23 1401)   Vital Signs (24h Range):  Temp:  [98.2 °F (36.8 °C)] 98.2 °F (36.8 °C)  Pulse:  [60] 60  Resp:  [18] 18  SpO2:  [97 %] 97 %  BP: (140)/(64) 140/64     Weight: 69 kg (152 lb 1.9 oz)  Body mass index is 22.45 kg/m².    Physical Exam  Vitals and nursing note reviewed.   Constitutional:       General: He is not in acute distress.     Appearance: Normal appearance. He is not ill-appearing or diaphoretic.   HENT:      Head: Normocephalic and atraumatic.      Comments: Facial swelling     Right Ear: External ear normal.      Left Ear: External ear normal.      Nose: Nose normal.      Mouth/Throat:      Mouth: Mucous membranes are moist.   Eyes:      General: No scleral icterus.        Right eye: No discharge.         Left eye: No discharge.      Extraocular Movements: Extraocular movements intact.      Comments: Bilateral periorbital and orbital edema, R>L  Mildly TTP   Cardiovascular:      Rate and Rhythm: Normal rate and  regular rhythm.      Heart sounds: Normal heart sounds. No murmur heard.  Pulmonary:      Effort: Pulmonary effort is normal. No respiratory distress.      Breath sounds: Normal breath sounds. No wheezing or rales.   Abdominal:      General: There is distension.      Palpations: Abdomen is soft. There is no mass.      Tenderness: There is no abdominal tenderness.   Musculoskeletal:         General: No swelling or deformity. Normal range of motion.      Cervical back: Normal range of motion and neck supple.      Right lower leg: Edema (1+) present.      Left lower leg: Edema (1+) present.   Skin:     General: Skin is warm.      Coloration: Skin is not jaundiced.      Findings: No bruising.      Comments: AVF to left arm   Neurological:      Mental Status: He is alert and oriented to person, place, and time.   Psychiatric:         Mood and Affect: Mood normal.         Behavior: Behavior normal.         Thought Content: Thought content normal.           Significant Labs: All pertinent labs within the past 24 hours have been reviewed.    Significant Imaging: I have reviewed all pertinent imaging results/findings within the past 24 hours.    Assessment/Plan:     Orbital edema  Patient presents with 3 days of orbital and periorbital edema right > left with associated blurry vision  No leukocytosis, afebrile, and HDS  Ophthalmology consulted in the ED, normal intraocular pressures  - CT orbits: facial edema including orbits     - appreciate ophthalmology assistance   - f/u recs  - consider CT orbits with contrast to evaluate further  - f/u procalcitonin  - f/u cultures   - consider broad spectrum abx if imaging, cultures, labs or vitals become concerning for infection       Type 2 diabetes mellitus with hyperglycemia, with long-term current use of insulin  Patient's FSGs are controlled on current medication regimen.  Last A1c reviewed-   Lab Results   Component Value Date    HGBA1C 9.5 (H) 03/06/2023     Most recent  fingerstick glucose reviewed- No results for input(s): POCTGLUCOSE in the last 24 hours.  Current correctional scale  Medium  Maintain anti-hyperglycemic dose as follows-   Antihyperglycemics (From admission, onward)      Start     Stop Route Frequency Ordered    03/10/23 1954  insulin aspart U-100 pen 1-10 Units         -- SubQ Before meals & nightly PRN 03/10/23 1854          Hold Oral hypoglycemics while patient is in the hospital.  Consider resuming patient on determir BID and aspart with meals if not controlled on SSI  POCT ACQHS    HLD (hyperlipidemia)  Continue home statin      Renovascular hypertension  Blood pressure 140/64 on arrival  Home regiment: clonidine, hydralazine, nifedipine, IMDUR, and coreg    - resume home nifedipine, IMDUR, and coreg  - hold clonidine and hydralazine for now, resume as needed for hypertension      Anemia in ESRD (end-stage renal disease)  Hb 11.1 on arrival near baseline.  No active signs or patient reports of bleeding.    - monitor CBC daily  - transfuse as needed for Hb < 7      Multiple subsegmental pulmonary emboli without acute cor pulmonale  Hx of pulmonary thromboembolism within a lobar branch to the right lower lobe noting additional pulmonary thromboembolism within segmental branches to the right upper lobe in 10/2022    - Continue home Eliquis    Chronic diastolic heart failure  Results for orders placed during the hospital encounter of 11/18/22  Echo  · The left ventricle is normal in size with mild concentric hypertrophy and increased density and normal systolic function.  · The estimated ejection fraction is 63%.  · Grade II left ventricular diastolic dysfunction.  · Severe left atrial enlargement.  · Mild right ventricular enlargement with low normal right ventricular systolic function.  · There is right ventricular hypertrophy.  · Moderate right atrial enlargement.  · Mild to moderate tricuspid regurgitation.  · Mild pulmonic regurgitation.  · Elevated central  venous pressure (15 mmHg).  · The estimated PA systolic pressure is 67 mmHg.  · There is moderate-severe pulmonary hypertension.  · Trivial posterolateral pericardial effusion.  · There is a moderate left pleural effusion.  · There may be some ascites present.    Patient home medications include carvedilol and IMDUR  - continue home medications  - nephrology consulted for HD  - continue home O2  - strict I&O  - fluid restrict to 1.5 liter on cardiac diet    ESRD on dialysis  HD MWF with AVF to [facility]  Patient last HD session on 3/8, missed session on 3/10 due to feeling ill  Still produces urine  Electrolytes stable and no urgent need for HD at this time although is volume overloaded and will need HD soon    - nephrology consulted for inpatient HD coordination, appreciate assistance   - f/u recommendations  - renally dose all medications  - avoid nephrotoxic agents  - strict I&O  - renal diet fluid restricted to 1.5L  - monitor renal function daily    Chronic respiratory failure with hypoxia  Patient with Hypoxic Respiratory failure which is Chronic.  he is on home oxygen at 3 LPM. Supplemental oxygen was provided and noted-  .   Signs/symptoms of respiratory failure include- none. Contributing diagnoses includes - CHF and Pleural effusion Labs and images were reviewed. Patient Has not had a recent ABG. Will treat underlying causes and adjust management of respiratory failure as follows.    - continue home O2 at 3L  - monitor respiratory function  - nephrology consulted for inpatient HD  - if develops respiratory stress, stat CXR and discuss with nephrology      VTE Risk Mitigation (From admission, onward)           Ordered     apixaban tablet 5 mg  2 times daily         03/10/23 1841     IP VTE HIGH RISK PATIENT  Once         03/10/23 1815     Place sequential compression device  Until discontinued         03/10/23 1815     Place sequential compression device  Until discontinued         03/10/23 1812                        Cecilia Dorman MD  Department of Hospital Medicine   [facility] - Emergency Dept

## 2023-03-11 NOTE — ASSESSMENT & PLAN NOTE
Patient with Hypoxic Respiratory failure which is Chronic.  he is on home oxygen at 3 LPM. Supplemental oxygen was provided and noted-  .   Signs/symptoms of respiratory failure include- none. Contributing diagnoses includes - CHF and Pleural effusion Labs and images were reviewed. Patient Has not had a recent ABG. Will treat underlying causes and adjust management of respiratory failure as follows.    - continue home O2 at 3L  - monitor respiratory function  - nephrology consulted for inpatient HD

## 2023-03-11 NOTE — SUBJECTIVE & OBJECTIVE
Past Medical History:   Diagnosis Date    Chronic kidney disease-mineral and bone disorder 10/12/2022    COPD (chronic obstructive pulmonary disease)     Diabetes mellitus type 1     ESRD on dialysis     Hypertension     Hypervolemia 2/22/2023    Hyponatremia 3/4/2023    SOB (shortness of breath) 10/12/2022    Patient with history COPD treated with Ellipta the albuterol, fluticasone-salmeterol tachypneic in the ED saturating 94% and 6 L on nasal cannula, patient does not know how many  he uses it is in nursing home.    -duo nebs Q 4  Given that patient is a poor historian, and in the setting of left lower extremity edema and history of coagulopathy PE cannot be ruled out.  Will workup for possible infec       Past Surgical History:   Procedure Laterality Date    AV FISTULA PLACEMENT         Review of patient's allergies indicates:  No Known Allergies  Current Facility-Administered Medications   Medication Frequency    albuterol-ipratropium 2.5 mg-0.5 mg/3 mL nebulizer solution 3 mL Q4H PRN    aspirin EC tablet 81 mg Daily    atorvastatin tablet 40 mg Daily    carvediloL tablet 6.25 mg BID    cetirizine tablet 5 mg Daily PRN    cloNIDine 0.3 mg/24 hr td ptwk 1 patch Every Sat    dextrose 10% bolus 125 mL 125 mL PRN    dextrose 10% bolus 250 mL 250 mL PRN    FLUoxetine capsule 40 mg Daily    glucagon (human recombinant) injection 1 mg PRN    glucose chewable tablet 16 g PRN    glucose chewable tablet 24 g PRN    hydrALAZINE tablet 100 mg Q8H    insulin aspart U-100 pen 1-10 Units QID (AC + HS) PRN    isosorbide mononitrate 24 hr tablet 30 mg BID    melatonin tablet 6 mg Nightly PRN    naloxone 0.4 mg/mL injection 0.02 mg PRN    NIFEdipine 24 hr tablet 90 mg Daily    sodium chloride 0.9% flush 10 mL PRN    sodium chloride 0.9% flush 10 mL Q12H PRN    tiotropium bromide 2.5 mcg/actuation inhaler 2 puff Daily     Family History       Problem Relation (Age of Onset)    Diabetes Mother          Tobacco Use    Smoking  status: Never    Smokeless tobacco: Never   Substance and Sexual Activity    Alcohol use: Not Currently    Drug use: Not on file    Sexual activity: Not Currently     Review of Systems   Constitutional:  Negative for chills and fever.   HENT:  Negative for rhinorrhea and sore throat.    Eyes:  Positive for visual disturbance.   Respiratory:  Positive for shortness of breath. Negative for cough.    Cardiovascular:  Negative for chest pain.   Gastrointestinal:  Positive for nausea and vomiting. Negative for abdominal pain, blood in stool and diarrhea.   Genitourinary:  Negative for dysuria and hematuria.   Neurological:  Negative for dizziness, light-headedness and headaches.   Psychiatric/Behavioral:  Negative for confusion and decreased concentration.    Objective:     Vital Signs (Most Recent):  Temp: 97.8 °F (36.6 °C) (03/11/23 0745)  Pulse: 73 (03/11/23 0745)  Resp: 20 (03/11/23 0745)  BP: (!) 179/91 (03/11/23 0745)  SpO2: (!) 92 % (03/11/23 0745)   Vital Signs (24h Range):  Temp:  [97 °F (36.1 °C)-98.3 °F (36.8 °C)] 97.8 °F (36.6 °C)  Pulse:  [60-74] 73  Resp:  [16-20] 20  SpO2:  [90 %-97 %] 92 %  BP: (140-183)/(64-92) 179/91     Weight: 70.4 kg (155 lb 3.3 oz) (03/10/23 2205)  Body mass index is 22.92 kg/m².  Body surface area is 1.85 meters squared.    No intake/output data recorded.    Physical Exam  Vitals and nursing note reviewed.   Constitutional:       General: He is not in acute distress.     Appearance: He is obese. He is not ill-appearing or toxic-appearing.   HENT:      Mouth/Throat:      Mouth: Mucous membranes are moist.      Pharynx: Oropharynx is clear. No oropharyngeal exudate or posterior oropharyngeal erythema.   Eyes:      General: No scleral icterus.        Right eye: No discharge.         Left eye: No discharge.      Extraocular Movements: Extraocular movements intact.      Conjunctiva/sclera: Conjunctivae normal.      Comments: R orbital edema   Cardiovascular:      Rate and Rhythm: Normal  rate and regular rhythm.      Heart sounds: No murmur heard.    No friction rub. No gallop.      Comments: UMA AVF ++ bruit/thrill  Pulmonary:      Effort: Pulmonary effort is normal. No respiratory distress.      Breath sounds: No wheezing or rales.   Abdominal:      General: There is distension.      Palpations: Abdomen is soft.      Tenderness: There is no abdominal tenderness.   Musculoskeletal:         General: Swelling (LFA) present.      Cervical back: Normal range of motion and neck supple.      Right lower leg: No edema.      Left lower leg: No edema.   Skin:     General: Skin is warm and dry.      Comments: Bandage to UMA AVF, intact and dry   Neurological:      General: No focal deficit present.      Mental Status: He is alert and oriented to person, place, and time.   Psychiatric:         Mood and Affect: Mood normal.         Behavior: Behavior normal.       Significant Labs:  CBC:   Recent Labs   Lab 03/11/23 0313   WBC 3.74*   RBC 3.45*   HGB 10.2*   HCT 33.0*      MCV 96   MCH 29.6   MCHC 30.9*     CMP:   Recent Labs   Lab 03/11/23 0313   *   CALCIUM 8.6*   ALBUMIN 2.8*   PROT 6.2      K 5.2*   CO2 24   CL 98   BUN 46*   CREATININE 4.6*   ALKPHOS 262*   ALT 44   AST 74*   BILITOT 0.8

## 2023-03-11 NOTE — CONSULTS
[facility] - Telemetry Stepdown  Nephrology  Consult Note    Patient Name: Cosme Lewis  MRN: [id number]  Admission Date: 3/10/2023  Hospital Length of Stay: 0 days  Attending Provider: Robbie Smith MD   Primary Care Physician: Primary Doctor No  Principal Problem:Orbital edema    Inpatient consult to Nephrology  Consult performed by: Juventino Mesa NP  Consult ordered by: Henri Perkins MD  Reason for consult: ESRD        Subjective:     HPI: Cosme Lewis is a 56 yo male with DM1, COPD on home oxygen, and ESRD on iHD MWF who presents from his nursing home with chief complaint of N/V x 1 day and R eye swelling.  He reported blurry vision from his eye associated with discomfort, unclear how long this problem has been going on.  Opthamology was consulted and CTH obtained and swelling felt to be from edema rather than an infectious etiology.  He has had several admissions for volume overload with hypertensive emergency due to missed dialysis treatments.  His last HD session was on Wednesday he reports, missing his Friday session because he was not feeling well.  Labs on day of consultation notable for hyperkalemia (5.2) without other concerning electrolyte abnormalities.  He reports feeling hungry on exam this morning, but did have an episode of vomiting.  LFA edematous and reported that he had tape on his UMA AVF that was tight along with some bleeding for the old cannulation sites, now controlled.            Past Medical History:   Diagnosis Date    Chronic kidney disease-mineral and bone disorder 10/12/2022    COPD (chronic obstructive pulmonary disease)     Diabetes mellitus type 1     ESRD on dialysis     Hypertension     Hypervolemia 2/22/2023    Hyponatremia 3/4/2023    SOB (shortness of breath) 10/12/2022    Patient with history COPD treated with Ellipta the albuterol, fluticasone-salmeterol tachypneic in the ED saturating 94% and 6 L on nasal cannula, patient does not know how many  he uses it  is in nursing home.    -duo nebs Q 4  Given that patient is a poor historian, and in the setting of left lower extremity edema and history of coagulopathy PE cannot be ruled out.  Will workup for possible infec       Past Surgical History:   Procedure Laterality Date    AV FISTULA PLACEMENT         Review of patient's allergies indicates:  No Known Allergies  Current Facility-Administered Medications   Medication Frequency    albuterol-ipratropium 2.5 mg-0.5 mg/3 mL nebulizer solution 3 mL Q4H PRN    aspirin EC tablet 81 mg Daily    atorvastatin tablet 40 mg Daily    carvediloL tablet 6.25 mg BID    cetirizine tablet 5 mg Daily PRN    cloNIDine 0.3 mg/24 hr td ptwk 1 patch Every Sat    dextrose 10% bolus 125 mL 125 mL PRN    dextrose 10% bolus 250 mL 250 mL PRN    FLUoxetine capsule 40 mg Daily    glucagon (human recombinant) injection 1 mg PRN    glucose chewable tablet 16 g PRN    glucose chewable tablet 24 g PRN    hydrALAZINE tablet 100 mg Q8H    insulin aspart U-100 pen 1-10 Units QID (AC + HS) PRN    isosorbide mononitrate 24 hr tablet 30 mg BID    melatonin tablet 6 mg Nightly PRN    naloxone 0.4 mg/mL injection 0.02 mg PRN    NIFEdipine 24 hr tablet 90 mg Daily    sodium chloride 0.9% flush 10 mL PRN    sodium chloride 0.9% flush 10 mL Q12H PRN    tiotropium bromide 2.5 mcg/actuation inhaler 2 puff Daily     Family History       Problem Relation (Age of Onset)    Diabetes Mother          Tobacco Use    Smoking status: Never    Smokeless tobacco: Never   Substance and Sexual Activity    Alcohol use: Not Currently    Drug use: Not on file    Sexual activity: Not Currently     Review of Systems   Constitutional:  Negative for chills and fever.   HENT:  Negative for rhinorrhea and sore throat.    Eyes:  Positive for visual disturbance.   Respiratory:  Positive for shortness of breath. Negative for cough.    Cardiovascular:  Negative for chest pain.   Gastrointestinal:  Positive for  nausea and vomiting. Negative for abdominal pain, blood in stool and diarrhea.   Genitourinary:  Negative for dysuria and hematuria.   Neurological:  Negative for dizziness, light-headedness and headaches.   Psychiatric/Behavioral:  Negative for confusion and decreased concentration.    Objective:     Vital Signs (Most Recent):  Temp: 97.8 °F (36.6 °C) (03/11/23 0745)  Pulse: 73 (03/11/23 0745)  Resp: 20 (03/11/23 0745)  BP: (!) 179/91 (03/11/23 0745)  SpO2: (!) 92 % (03/11/23 0745)   Vital Signs (24h Range):  Temp:  [97 °F (36.1 °C)-98.3 °F (36.8 °C)] 97.8 °F (36.6 °C)  Pulse:  [60-74] 73  Resp:  [16-20] 20  SpO2:  [90 %-97 %] 92 %  BP: (140-183)/(64-92) 179/91     Weight: 70.4 kg (155 lb 3.3 oz) (03/10/23 2205)  Body mass index is 22.92 kg/m².  Body surface area is 1.85 meters squared.    No intake/output data recorded.    Physical Exam  Vitals and nursing note reviewed.   Constitutional:       General: He is not in acute distress.     Appearance: He is obese. He is not ill-appearing or toxic-appearing.   HENT:      Mouth/Throat:      Mouth: Mucous membranes are moist.      Pharynx: Oropharynx is clear. No oropharyngeal exudate or posterior oropharyngeal erythema.   Eyes:      General: No scleral icterus.        Right eye: No discharge.         Left eye: No discharge.      Extraocular Movements: Extraocular movements intact.      Conjunctiva/sclera: Conjunctivae normal.      Comments: R orbital edema   Cardiovascular:      Rate and Rhythm: Normal rate and regular rhythm.      Heart sounds: No murmur heard.    No friction rub. No gallop.      Comments: UMA AVF ++ bruit/thrill  Pulmonary:      Effort: Pulmonary effort is normal. No respiratory distress.      Breath sounds: No wheezing or rales.   Abdominal:      General: There is distension.      Palpations: Abdomen is soft.      Tenderness: There is no abdominal tenderness.   Musculoskeletal:         General: Swelling (LFA) present.      Cervical back: Normal range  of motion and neck supple.      Right lower leg: No edema.      Left lower leg: No edema.   Skin:     General: Skin is warm and dry.      Comments: Bandage to UMA AVF, intact and dry   Neurological:      General: No focal deficit present.      Mental Status: He is alert and oriented to person, place, and time.   Psychiatric:         Mood and Affect: Mood normal.         Behavior: Behavior normal.       Significant Labs:  CBC:   Recent Labs   Lab 03/11/23  0313   WBC 3.74*   RBC 3.45*   HGB 10.2*   HCT 33.0*      MCV 96   MCH 29.6   MCHC 30.9*     CMP:   Recent Labs   Lab 03/11/23  0313   *   CALCIUM 8.6*   ALBUMIN 2.8*   PROT 6.2      K 5.2*   CO2 24   CL 98   BUN 46*   CREATININE 4.6*   ALKPHOS 262*   ALT 44   AST 74*   BILITOT 0.8         Assessment/Plan:     Renal/  Chronic kidney disease-mineral and bone disorder  -renal diet  -phos WNL, hold phos binders for now.   -check phos with labs    ESRD on dialysis  ESRD on iHD F  [facility]  4 hours  EDW: ?68.5 kg  Minimal residual renal fx  UMA AVF    Plan/Recommendations:  -HD today for metabolic clearance/volume management  -UF 3L as tolerated  -if high venous pressures/prolonged bleeding post HD will obtain vascular US to r/o outflow stenosis  -strict I/O's  -place on fluid restrictions of 1L    Oncology  Anemia in ESRD (end-stage renal disease)  hgb @ goal for ESRD  -hold MARCIA therapy for now, can be resumed as OP      Juventino Nguyen, NP  Nephrology  [facility] - Telemetry Stepdown

## 2023-03-11 NOTE — PROGRESS NOTES
[facility] - Telemetry [facility] Medicine  Progress Note    Patient Name: Cosme Lweis  MRN: [id number]  Patient Class: OP- Observation   Admission Date: 3/10/2023  Length of Stay: 0 days  Attending Physician: Robbie Smith MD  Primary Care Provider: Primary Doctor No        Subjective:     Principal Problem:ESRD on dialysis        HPI:  Cosme Lewis is a 57 y.o. with CKD on HD MWF, DM1, Renovascular HTN, Chronic respiratory failure (on 3L of home O2), HFpEF, and Mild malnutrition who presents to the ED for vomiting and eye swelling. He began feeling nauseated with SOB, LE swelling, abdominal swelling, and facial swelling 3 days ago. He reports blurry vision associated with his eye swelling and discomfort. He has also had dizziness since the onset of symptoms. He reports vomiting twice today but denies blood in his vomit. Denies fever, chills, diarrhea, cough, dysuria, and chest pain. He still produces urine and his last complete session of HD was on 3/8 as he missed HD today due to feeling ill.       Of note, patient recently hospitalized in the MICU here and discharged on 3/7 to [facility] for hypertensive emergency and acute hypoxic respiratory failure.    In the ED, patient arrived afebrile and HDS satting well on RA. Labs remarkable for anemia 11 at baseline, elevated alk phos 261, normal lactate, and normal lipase. CT of the orbits demonstrated diffuse facial edema. CT A/P demonstrated pleural effusions, pulmonary edema, anasarca, and ascites.     Patient admitted to hospital medicine.       Overview/Hospital Course:  Patient admitted for volume overload 2/2 missed HD session. Ophthalmology consulted, is not concerned for cellulitis at this time as imaging and clinical picture more consistent with edema. Nephrology consulted and assisting with HD coordination. Overnight, patient noted to have oozing from AVF on LUE with distal arm edema. Vascular surgery consulted, recommended holding eliquis and  getting US of AVF with further evaluation to be done on 3/13.       Interval History: AVF to KATEE noted to be oozing blood, significantly distended, and with distal arm edema. Patient reports resolution of nausea/vomiting and reports significant hunger even after eating full breakfast, will double his portions.     Review of Systems  Objective:     Vital Signs (Most Recent):  Temp: 97.5 °F (36.4 °C) (03/11/23 1330)  Pulse: 67 (03/11/23 1415)  Resp: 20 (03/11/23 0745)  BP: (!) 162/89 (03/11/23 1415)  SpO2: (!) 93 % (03/11/23 1330)   Vital Signs (24h Range):  Temp:  [97 °F (36.1 °C)-98.3 °F (36.8 °C)] 97.5 °F (36.4 °C)  Pulse:  [67-74] 67  Resp:  [16-20] 20  SpO2:  [90 %-95 %] 93 %  BP: (159-183)/(75-96) 162/89     Weight: 70.4 kg (155 lb 3.3 oz)  Body mass index is 22.92 kg/m².    Intake/Output Summary (Last 24 hours) at 3/11/2023 1458  Last data filed at 3/11/2023 0955  Gross per 24 hour   Intake 240 ml   Output --   Net 240 ml      Physical Exam  Vitals and nursing note reviewed.   Constitutional:       General: He is not in acute distress.     Appearance: Normal appearance. He is not ill-appearing or diaphoretic.   HENT:      Head: Normocephalic and atraumatic.      Right Ear: External ear normal.      Left Ear: External ear normal.      Nose: Nose normal.      Mouth/Throat:      Mouth: Mucous membranes are moist.   Eyes:      General: No scleral icterus.        Right eye: No discharge.         Left eye: No discharge.      Conjunctiva/sclera: Conjunctivae normal.      Comments: Bilateral periorbital and orbital edema, R>L  Mildly TTP    Cardiovascular:      Rate and Rhythm: Normal rate and regular rhythm.      Heart sounds: Normal heart sounds. No murmur heard.  Pulmonary:      Effort: Pulmonary effort is normal. No respiratory distress.      Breath sounds: Normal breath sounds. No wheezing or rales.   Abdominal:      General: There is distension.      Palpations: Abdomen is soft. There is no mass.      Tenderness:  There is no abdominal tenderness.   Musculoskeletal:         General: Swelling present. No deformity. Normal range of motion.      Cervical back: Normal range of motion and neck supple.      Right lower leg: Edema (1+) present.      Left lower leg: Edema (1+) present.   Skin:     General: Skin is warm.      Coloration: Skin is not jaundiced.      Findings: No bruising.      Comments: AVF on LUE oozing blood from needle sites, significantly enlarged/bulging, and with distal arm edema   Neurological:      Mental Status: He is alert and oriented to person, place, and time.   Psychiatric:         Mood and Affect: Mood normal.         Behavior: Behavior normal.         Thought Content: Thought content normal.       Significant Labs: All pertinent labs within the past 24 hours have been reviewed.    Significant Imaging: I have reviewed all pertinent imaging results/findings within the past 24 hours.      Assessment/Plan:      * ESRD on dialysis  HD MWF with AVF to LUE  Patient last HD session on 3/8, missed session on 3/10 due to feeling ill  Still produces urine  Electrolytes stable and no urgent need for HD at this time although is volume overloaded and will need HD soon    - nephrology consulted for inpatient HD coordination, appreciate assistance  - renally dose all medications  - avoid nephrotoxic agents  - strict I&O  - renal diet fluid restricted to 1.5L  - monitor renal function daily    AVF (arteriovenous fistula)  AVF to LUE oozing blood from needle sites, significantly enlarged/buldging, and with distal arm edema    - vascular surgery consulted, appreciate assistance   - f/u AVF US   - hold eliquis   - perform HD using AVF    Orbital edema  Patient presents with 3 days of orbital and periorbital edema right > left with associated blurry vision  No leukocytosis, afebrile, and HDS  Ophthalmology consulted in the ED, normal intraocular pressures  - CT orbits: facial edema including orbits     - appreciate  ophthalmology assistance   - not concerned for cellulitis   - retina specialist follow up outpatient  - f/u cultures   - consider broad spectrum abx if imaging, cultures, labs or vitals become concerning for infection    Type 2 diabetes mellitus with hyperglycemia, with long-term current use of insulin    Lab Results   Component Value Date    HGBA1C 9.5 (H) 03/06/2023     Recent Labs   Lab 03/11/23  0747 03/11/23  1134   POCTGLUCOSE 338* 283*     Current correctional scale  Medium  Maintain anti-hyperglycemic dose as follows-   Antihyperglycemics (From admission, onward)    Start     Stop Route Frequency Ordered    03/11/23 2100  insulin detemir U-100 pen 5 Units         -- SubQ 2 times daily 03/11/23 1457    03/11/23 1645  insulin aspart U-100 pen 4 Units         -- SubQ 3 times daily with meals 03/11/23 1457    03/10/23 1954  insulin aspart U-100 pen 1-10 Units         -- SubQ Before meals & nightly PRN 03/10/23 1854        Hold Oral hypoglycemics while patient is in the hospital.  POCT ACQHS    HLD (hyperlipidemia)  Continue home statin      Renovascular hypertension  Blood pressure 140/64 on arrival  Home regiment: clonidine, hydralazine, nifedipine, IMDUR, and coreg    - resume home regiment      Anemia in ESRD (end-stage renal disease)  Hb 11.1 on arrival near baseline.  No active signs or patient reports of bleeding.    - monitor CBC daily  - transfuse as needed for Hb < 7      Multiple subsegmental pulmonary emboli without acute cor pulmonale  Hx of pulmonary thromboembolism within a lobar branch to the right lower lobe noting additional pulmonary thromboembolism within segmental branches to the right upper lobe in 10/2022    - hold home Eliquis in the setting of oozing AVF    Chronic diastolic heart failure  Results for orders placed during the hospital encounter of 11/18/22  Echo  · The left ventricle is normal in size with mild concentric hypertrophy and increased density and normal systolic function.  ·  The estimated ejection fraction is 63%.  · Grade II left ventricular diastolic dysfunction.  · Severe left atrial enlargement.  · Mild right ventricular enlargement with low normal right ventricular systolic function.  · There is right ventricular hypertrophy.  · Moderate right atrial enlargement.  · Mild to moderate tricuspid regurgitation.  · Mild pulmonic regurgitation.  · Elevated central venous pressure (15 mmHg).  · The estimated PA systolic pressure is 67 mmHg.  · There is moderate-severe pulmonary hypertension.  · Trivial posterolateral pericardial effusion.  · There is a moderate left pleural effusion.  · There may be some ascites present.    Patient home medications include carvedilol and IMDUR  - continue home medications  - nephrology consulted for HD  - continue home O2  - strict I&O  - fluid restrict to 1.5 liter on cardiac diet    Chronic respiratory failure with hypoxia  Patient with Hypoxic Respiratory failure which is Chronic.  he is on home oxygen at 3 LPM. Supplemental oxygen was provided and noted-  .   Signs/symptoms of respiratory failure include- none. Contributing diagnoses includes - CHF and Pleural effusion Labs and images were reviewed. Patient Has not had a recent ABG. Will treat underlying causes and adjust management of respiratory failure as follows.    - continue home O2 at 3L  - monitor respiratory function  - nephrology consulted for inpatient HD      VTE Risk Mitigation (From admission, onward)         Ordered     IP VTE HIGH RISK PATIENT  Once         03/10/23 1815     Place sequential compression device  Until discontinued         03/10/23 1812                Discharge Planning   GERA:      Code Status: Full Code   Is the patient medically ready for discharge?: No    Reason for patient still in hospital (select all that apply): Patient trending condition, Treatment, Imaging and Consult recommendations                     Cecilia Dorman MD  Department of Hospital Medicine    [facility] - Telemetry Stepdown

## 2023-03-11 NOTE — ASSESSMENT & PLAN NOTE
Blood pressure 140/64 on arrival  Home regiment: clonidine, hydralazine, nifedipine, IMDUR, and coreg    - resume home nifedipine, IMDUR, and coreg  - hold clonidine and hydralazine for now, resume as needed for hypertension

## 2023-03-11 NOTE — ASSESSMENT & PLAN NOTE
Hb 11.1 on arrival near baseline.  No active signs or patient reports of bleeding.    - monitor CBC daily  - transfuse as needed for Hb < 7

## 2023-03-11 NOTE — CONSULTS
[facility] - Telemetry Stepdown  Vascular Surgery  Consult Note    Inpatient consult to Vascular Surgery  Consult performed by: Lance Schmidt MD  Consult ordered by: Shayne Miles MD        Subjective:     Chief Complaint/Reason for Admission: Orbital swelling    History of Present Illness: Mr Lewis is a 57 year old male with a pmh of DM1, COPD on home oxygen, and ESRD on iHD MWF who presents from his nursing home with chief complaint of N/V x 1 day and R eye swelling.  We were consulted for bleeding after a HD stock site. There is a very slight ooze coming from the needle stick.       Medications Prior to Admission   Medication Sig Dispense Refill Last Dose    apixaban (ELIQUIS) 5 mg Tab Take 5 mg by mouth 2 (two) times daily.   3/10/2023    aspirin (ECOTRIN) 81 MG EC tablet Take 81 mg by mouth once daily.   3/10/2023    atorvastatin (LIPITOR) 40 MG tablet Take 1 tablet (40 mg total) by mouth once daily. 90 tablet 3 3/10/2023    carvediloL (COREG) 3.125 MG tablet Take 2 tablets (6.25 mg total) by mouth 2 (two) times daily. 120 tablet 11 3/10/2023    cetirizine (ZYRTEC) 10 MG tablet Take 10 mg by mouth daily as needed (itching).   3/10/2023    cetirizine (ZYRTEC) 5 MG tablet Take 1 tablet (5 mg total) by mouth daily as needed (itching). 30 tablet 0 3/10/2023    isosorbide mononitrate (IMDUR) 30 MG 24 hr tablet Take 30 mg by mouth 2 (two) times daily.   3/10/2023    acetaminophen (TYLENOL) 650 MG TbSR Take 650 mg by mouth every 8 (eight) hours as needed (pain or fever over 100.4).   Unknown    albuterol (PROVENTIL/VENTOLIN HFA) 90 mcg/actuation inhaler Inhale 2 puffs into the lungs every 6 (six) hours as needed for Wheezing or Shortness of Breath.   Unknown    albuterol-ipratropium (DUO-NEB) 2.5 mg-0.5 mg/3 mL nebulizer solution Take 3 mLs by nebulization every 4 (four) hours while awake. Rescue 6480 mL 0     cloNIDine 0.3 mg/24 hr td ptwk (CATAPRES) 0.3 mg/24 hr Place 1 patch onto the skin every Saturday.    Unknown    erythromycin (ROMYCIN) ophthalmic ointment Place a 1/2 inch ribbon of ointment into the lower eyelid three times a day. 3.5 g 0 Unknown    FLUoxetine 40 MG capsule Take 40 mg by mouth once daily.   Unknown    fluticasone-salmeterol diskus inhaler 250-50 mcg Inhale 1 puff into the lungs 2 (two) times daily.   Unknown    GLUCAGON EMERGENCY KIT, HUMAN, 1 mg injection 1 mg into the muscle as needed if CBG < 70   Unknown    hydrALAZINE (APRESOLINE) 100 MG tablet Take 1 tablet (100 mg total) by mouth every 8 (eight) hours.   Unknown    HYDROPHILIC CREAM TOP Apply liberal amount to affected area twice daily for dry skin   Unknown    insulin aspart U-100 (NOVOLOG) 100 unit/mL (3 mL) InPn pen Inject 5 Units into the skin 3 (three) times daily with meals. (Plus sliding scale) 15 mL 3 Unknown    insulin detemir U-100 (LEVEMIR FLEXTOUCH) 100 unit/mL (3 mL) SubQ InPn pen Inject 20 Units into the skin every morning and 15 units in the evening. 12 mL 3 Unknown    insulin detemir U-100 (LEVEMIR FLEXTOUCH) 100 unit/mL (3 mL) SubQ InPn pen Inject 7 Units into the skin 2 (two) times daily. 4.2 mL 11 Unknown    insulin lispro (HUMALOG U-100 INSULIN) 100 unit/mL injection Inject 5 Units into the skin 3 (three) times daily before meals. 4.5 mL 11 Unknown    melatonin 3 mg TbDL Take 9 mg by mouth nightly as needed (sleep).   Unknown    NIFEdipine (PROCARDIA-XL) 90 MG (OSM) 24 hr tablet Take 1 tablet (90 mg total) by mouth once daily. 30 tablet 11 Unknown    nut.tx.imp.renal fxn,lac-reduc (NEPRO CARB STEADY ORAL) Give 1 carton by mouth with each meal.   Unknown    sodium chloride (OCEAN) 0.65 % nasal spray 2 sprays by Nasal route every 4 (four) hours as needed for Congestion.   Unknown    tiotropium bromide (SPIRIVA RESPIMAT) 2.5 mcg/actuation inhaler Inhale 2 puffs into the lungs Daily.   Unknown    triamcinolone acetonide 0.025 % Lotn Apply topically. Apply to the affected area twice daily   Unknown    vitamin  renal formula, B-complex-vitamin c-folic acid, (NEPHROCAP) 1 mg Cap Take 1 capsule by mouth once daily.   Unknown       Review of patient's allergies indicates:  No Known Allergies    Past Medical History:   Diagnosis Date    Chronic kidney disease-mineral and bone disorder 10/12/2022    COPD (chronic obstructive pulmonary disease)     Diabetes mellitus type 1     ESRD on dialysis     Hypertension     Hypervolemia 2/22/2023    Hyponatremia 3/4/2023    SOB (shortness of breath) 10/12/2022    Patient with history COPD treated with Ellipta the albuterol, fluticasone-salmeterol tachypneic in the ED saturating 94% and 6 L on nasal cannula, patient does not know how many  he uses it is in nursing home.    -duo nebs Q 4  Given that patient is a poor historian, and in the setting of left lower extremity edema and history of coagulopathy PE cannot be ruled out.  Will workup for possible infec     Past Surgical History:   Procedure Laterality Date    AV FISTULA PLACEMENT       Family History       Problem Relation (Age of Onset)    Diabetes Mother          Tobacco Use    Smoking status: Never    Smokeless tobacco: Never   Substance and Sexual Activity    Alcohol use: Not Currently    Drug use: Not on file    Sexual activity: Not Currently     Review of Systems   Constitutional: Negative.    All other systems reviewed and are negative.  Objective:     Vital Signs (Most Recent):  Temp: 97.8 °F (36.6 °C) (03/11/23 0745)  Pulse: 73 (03/11/23 0745)  Resp: 20 (03/11/23 0745)  BP: (!) 179/91 (03/11/23 0745)  SpO2: (!) 92 % (03/11/23 0745)   Vital Signs (24h Range):  Temp:  [97 °F (36.1 °C)-98.3 °F (36.8 °C)] 97.8 °F (36.6 °C)  Pulse:  [60-74] 73  Resp:  [16-20] 20  SpO2:  [90 %-97 %] 92 %  BP: (140-183)/(64-92) 179/91     Weight: 70.4 kg (155 lb 3.3 oz)  Body mass index is 22.92 kg/m².    Physical Exam  Vitals reviewed.   Constitutional:       Appearance: He is ill-appearing.   Cardiovascular:      Comments: Good  thrill in BB AVF  Abdominal:      General: Abdomen is flat.   Musculoskeletal:         General: Normal range of motion.   Skin:     General: Skin is warm and dry.      Capillary Refill: Capillary refill takes less than 2 seconds.   Neurological:      General: No focal deficit present.      Mental Status: He is alert and oriented to person, place, and time.       Significant Labs:  CBC:   Recent Labs   Lab 03/11/23  0313   WBC 3.74*   RBC 3.45*   HGB 10.2*   HCT 33.0*      MCV 96   MCH 29.6   MCHC 30.9*     CMP:   Recent Labs   Lab 03/11/23  0313   *   CALCIUM 8.6*   ALBUMIN 2.8*   PROT 6.2      K 5.2*   CO2 24   CL 98   BUN 46*   CREATININE 4.6*   ALKPHOS 262*   ALT 44   AST 74*   BILITOT 0.8       Significant Diagnostics:  I have reviewed all pertinent imaging results/findings within the past 24 hours.    Assessment/Plan:   56 yo M with DM, COPD, ESRD, MWF, right eye swelling   Consult for bleeding avf    -- maintain very light pressure with quickclot  -- No ace wraps or tourniquets  -- HD access US  -- may req fistulogram in future  -- Nephro for HD        Thank you for your consult. I will follow-up with patient. Please contact us if you have any additional questions.    Lance Schmidt MD  Vascular Surgery  [facility] - Tele[facility] Stepdown

## 2023-03-11 NOTE — ASSESSMENT & PLAN NOTE
Patient presents with 3 days of orbital and periorbital edema right > left with associated blurry vision  No leukocytosis, afebrile, and HDS  Ophthalmology consulted in the ED, normal intraocular pressures  - CT orbits: facial edema including orbits     - appreciate ophthalmology assistance   - f/u recs  - consider CT orbits with contrast to evaluate further  - f/u procalcitonin  - f/u cultures   - consider broad spectrum abx if imaging, cultures, labs or vitals become concerning for infection

## 2023-03-11 NOTE — ASSESSMENT & PLAN NOTE
HD MWF with AVF to LUE  Patient last HD session on 3/8, missed session on 3/10 due to feeling ill  Still produces urine  Electrolytes stable and no urgent need for HD at this time although is volume overloaded and will need HD soon    - nephrology consulted for inpatient HD coordination, appreciate assistance   - f/u recommendations  - renally dose all medications  - avoid nephrotoxic agents  - strict I&O  - renal diet fluid restricted to 1.5L  - monitor renal function daily

## 2023-03-11 NOTE — PROGRESS NOTES
Patient arrived via bed.   Awake, alert and responsive.  VSS.   HD TX started via UMA AV fistula using 16 g needles.

## 2023-03-11 NOTE — ASSESSMENT & PLAN NOTE
Patient with Hypoxic Respiratory failure which is Chronic.  he is on home oxygen at 3 LPM. Supplemental oxygen was provided and noted-  .   Signs/symptoms of respiratory failure include- none. Contributing diagnoses includes - CHF and Pleural effusion Labs and images were reviewed. Patient Has not had a recent ABG. Will treat underlying causes and adjust management of respiratory failure as follows.    - continue home O2 at 3L  - monitor respiratory function  - nephrology consulted for inpatient HD  - if develops respiratory stress, stat CXR and discuss with nephrology

## 2023-03-11 NOTE — SUBJECTIVE & OBJECTIVE
Past Medical History:   Diagnosis Date    Chronic kidney disease-mineral and bone disorder 10/12/2022    COPD (chronic obstructive pulmonary disease)     Diabetes mellitus type 1     ESRD on dialysis     Hypertension     Hypervolemia 2/22/2023    Hyponatremia 3/4/2023    SOB (shortness of breath) 10/12/2022    Patient with history COPD treated with Ellipta the albuterol, fluticasone-salmeterol tachypneic in the ED saturating 94% and 6 L on nasal cannula, patient does not know how many  he uses it is in nursing home.    -duo nebs Q 4  Given that patient is a poor historian, and in the setting of left lower extremity edema and history of coagulopathy PE cannot be ruled out.  Will workup for possible infec       Past Surgical History:   Procedure Laterality Date    AV FISTULA PLACEMENT         Review of patient's allergies indicates:  No Known Allergies    No current facility-administered medications on file prior to encounter.     Current Outpatient Medications on File Prior to Encounter   Medication Sig    acetaminophen (TYLENOL) 650 MG TbSR Take 650 mg by mouth every 8 (eight) hours as needed (pain or fever over 100.4).    albuterol (PROVENTIL/VENTOLIN HFA) 90 mcg/actuation inhaler Inhale 2 puffs into the lungs every 6 (six) hours as needed for Wheezing or Shortness of Breath.    albuterol-ipratropium (DUO-NEB) 2.5 mg-0.5 mg/3 mL nebulizer solution Take 3 mLs by nebulization every 4 (four) hours while awake. Rescue    apixaban (ELIQUIS) 5 mg Tab Take 5 mg by mouth 2 (two) times daily.    aspirin (ECOTRIN) 81 MG EC tablet Take 81 mg by mouth once daily.    atorvastatin (LIPITOR) 40 MG tablet Take 1 tablet (40 mg total) by mouth once daily.    carvediloL (COREG) 3.125 MG tablet Take 2 tablets (6.25 mg total) by mouth 2 (two) times daily.    cetirizine (ZYRTEC) 10 MG tablet Take 10 mg by mouth daily as needed (itching).    cetirizine (ZYRTEC) 5 MG tablet Take 1 tablet (5 mg total) by mouth daily as needed (itching).     cloNIDine 0.3 mg/24 hr td ptwk (CATAPRES) 0.3 mg/24 hr Place 1 patch onto the skin every Saturday.    erythromycin (ROMYCIN) ophthalmic ointment Place a 1/2 inch ribbon of ointment into the lower eyelid three times a day.    FLUoxetine 40 MG capsule Take 40 mg by mouth once daily.    fluticasone-salmeterol diskus inhaler 250-50 mcg Inhale 1 puff into the lungs 2 (two) times daily.    GLUCAGON EMERGENCY KIT, HUMAN, 1 mg injection 1 mg into the muscle as needed if CBG < 70    hydrALAZINE (APRESOLINE) 100 MG tablet Take 1 tablet (100 mg total) by mouth every 8 (eight) hours.    HYDROPHILIC CREAM TOP Apply liberal amount to affected area twice daily for dry skin    insulin aspart U-100 (NOVOLOG) 100 unit/mL (3 mL) InPn pen Inject 5 Units into the skin 3 (three) times daily with meals. (Plus sliding scale)    insulin detemir U-100 (LEVEMIR FLEXTOUCH) 100 unit/mL (3 mL) SubQ InPn pen Inject 20 Units into the skin every morning and 15 units in the evening.    insulin detemir U-100 (LEVEMIR FLEXTOUCH) 100 unit/mL (3 mL) SubQ InPn pen Inject 7 Units into the skin 2 (two) times daily.    insulin lispro (HUMALOG U-100 INSULIN) 100 unit/mL injection Inject 5 Units into the skin 3 (three) times daily before meals.    isosorbide mononitrate (IMDUR) 30 MG 24 hr tablet Take 30 mg by mouth 2 (two) times daily.    melatonin 3 mg TbDL Take 9 mg by mouth nightly as needed (sleep).    NIFEdipine (PROCARDIA-XL) 90 MG (OSM) 24 hr tablet Take 1 tablet (90 mg total) by mouth once daily.    nut.tx.imp.renal fxn,lac-reduc (NEPRO CARB STEADY ORAL) Give 1 carton by mouth with each meal.    sodium chloride (OCEAN) 0.65 % nasal spray 2 sprays by Nasal route every 4 (four) hours as needed for Congestion.    tiotropium bromide (SPIRIVA RESPIMAT) 2.5 mcg/actuation inhaler Inhale 2 puffs into the lungs Daily.    triamcinolone acetonide 0.025 % Lotn Apply topically. Apply to the affected area twice daily    vitamin renal formula, B-complex-vitamin  c-folic acid, (NEPHROCAP) 1 mg Cap Take 1 capsule by mouth once daily.     Family History       Problem Relation (Age of Onset)    Diabetes Mother          Tobacco Use    Smoking status: Never    Smokeless tobacco: Never   Substance and Sexual Activity    Alcohol use: Not Currently    Drug use: Not on file    Sexual activity: Not Currently     Review of Systems   Constitutional:  Negative for chills and fever.   HENT:  Negative for rhinorrhea and sore throat.    Eyes:  Positive for visual disturbance.   Respiratory:  Positive for shortness of breath. Negative for cough.    Cardiovascular:  Negative for chest pain.   Gastrointestinal:  Positive for nausea and vomiting. Negative for abdominal pain, blood in stool and diarrhea.   Genitourinary:  Negative for dysuria and hematuria.   Neurological:  Positive for dizziness. Negative for light-headedness.   Psychiatric/Behavioral:  Negative for confusion and decreased concentration.    Objective:     Vital Signs (Most Recent):  Temp: 98.2 °F (36.8 °C) (03/10/23 1644)  Pulse: 60 (03/10/23 1401)  Resp: 18 (03/10/23 1401)  BP: (!) 140/64 (03/10/23 1401)  SpO2: 97 % (03/10/23 1401)   Vital Signs (24h Range):  Temp:  [98.2 °F (36.8 °C)] 98.2 °F (36.8 °C)  Pulse:  [60] 60  Resp:  [18] 18  SpO2:  [97 %] 97 %  BP: (140)/(64) 140/64     Weight: 69 kg (152 lb 1.9 oz)  Body mass index is 22.45 kg/m².    Physical Exam  Vitals and nursing note reviewed.   Constitutional:       General: He is not in acute distress.     Appearance: Normal appearance. He is not ill-appearing or diaphoretic.   HENT:      Head: Normocephalic and atraumatic.      Comments: Facial swelling     Right Ear: External ear normal.      Left Ear: External ear normal.      Nose: Nose normal.      Mouth/Throat:      Mouth: Mucous membranes are moist.   Eyes:      General: No scleral icterus.        Right eye: No discharge.         Left eye: No discharge.      Extraocular Movements: Extraocular movements intact.       Comments: Bilateral periorbital and orbital edema, R>L  Mildly TTP   Cardiovascular:      Rate and Rhythm: Normal rate and regular rhythm.      Heart sounds: Normal heart sounds. No murmur heard.  Pulmonary:      Effort: Pulmonary effort is normal. No respiratory distress.      Breath sounds: Normal breath sounds. No wheezing or rales.   Abdominal:      General: There is distension.      Palpations: Abdomen is soft. There is no mass.      Tenderness: There is no abdominal tenderness.   Musculoskeletal:         General: No swelling or deformity. Normal range of motion.      Cervical back: Normal range of motion and neck supple.      Right lower leg: Edema (1+) present.      Left lower leg: Edema (1+) present.   Skin:     General: Skin is warm.      Coloration: Skin is not jaundiced.      Findings: No bruising.      Comments: AVF to left arm   Neurological:      Mental Status: He is alert and oriented to person, place, and time.   Psychiatric:         Mood and Affect: Mood normal.         Behavior: Behavior normal.         Thought Content: Thought content normal.           Significant Labs: All pertinent labs within the past 24 hours have been reviewed.    Significant Imaging: I have reviewed all pertinent imaging results/findings within the past 24 hours.

## 2023-03-11 NOTE — ASSESSMENT & PLAN NOTE
Hx of pulmonary thromboembolism within a lobar branch to the right lower lobe noting additional pulmonary thromboembolism within segmental branches to the right upper lobe in 10/2022    - hold home Eliquis in the setting of oozing AVF

## 2023-03-11 NOTE — ASSESSMENT & PLAN NOTE
HD MWF with AVF to [facility]  Patient last HD session on 3/8, missed session on 3/10 due to feeling ill  Still produces urine  Electrolytes stable and no urgent need for HD at this time although is volume overloaded and will need HD soon    - nephrology consulted for inpatient HD coordination, appreciate assistance  - renally dose all medications  - avoid nephrotoxic agents  - strict I&O  - renal diet fluid restricted to 1.5L  - monitor renal function daily

## 2023-03-11 NOTE — HPI
Cosme Lewis is a 58 yo male with DM1, COPD on home oxygen, and ESRD on iHD MWF who presents from his nursing home with chief complaint of N/V x 1 day and R eye swelling.  He reported blurry vision from his eye associated with discomfort, unclear how long this problem has been going on.  Opthamology was consulted and CTH obtained and swelling felt to be from edema rather than an infectious etiology.  He has had several admissions for volume overload with hypertensive emergency due to missed dialysis treatments.  His last HD session was on Wednesday he reports, missing his Friday session because he was not feeling well.  Labs on day of consultation notable for hyperkalemia (5.2) without other concerning electrolyte abnormalities.  He reports feeling hungry on exam this morning, but did have an episode of vomiting.  LFA edematous and reported that he had tape on his UMA AVF that was tight along with some bleeding for the old cannulation sites, now controlled.

## 2023-03-11 NOTE — ASSESSMENT & PLAN NOTE
ESRD on iHD MWF  [facility]  4 hours  EDW: ?68.5 kg  Minimal residual renal fx  UMA AVF    Plan/Recommendations:  -HD today for metabolic clearance/volume management  -UF 3L as tolerated  -if high venous pressures/prolonged bleeding post HD will obtain vascular US to r/o outflow stenosis  -strict I/O's  -place on fluid restrictions of 1L

## 2023-03-11 NOTE — HOSPITAL COURSE
Patient admitted for volume overload 2/2 missed HD session and orbital swelling. Ophthalmology consulted, is not concerned for cellulitis at this time as imaging and clinical picture more consistent with edema. Nephrology consulted and assisting with HD coordination. On 3/11, patient noted to have oozing from AVF on LUE with distal arm edema. Vascular surgery consulted, recommended holding eliquis and getting US of AVF with further evaluation to be done on 3/13. HD was performed on 3/11 with 3.5L removed, 2L removed on 3/14. Patient developed acute hypoxic respiratory failure on 3/12 with an increased O2 requirement to 12L. CTA negative for PE but with GGO suggestive of volume overload. Pt noted to have vomitus w c/f possible aspiration PNA vs pneumonitis and was initiated on ceftriaxine/doxycycline. Eliquis resumed. Oxygen requirements continuing to downtrend near pts baseline. On 3/15, patient satting 93%+ on home O2 regiment of 3-5L NC. Patient medically ready for discharge back to [facility] nursing [facility] with close PCP follow up and a supply of doxycycline to complete a 5 day course of therapy.

## 2023-03-11 NOTE — PLAN OF CARE
Problem: Adult Inpatient Plan of Care  Goal: Plan of Care Review  Outcome: Ongoing, Progressing  Goal: Patient-Specific Goal (Individualized)  Outcome: Ongoing, Progressing  Goal: Absence of Hospital-Acquired Illness or Injury  Outcome: Ongoing, Progressing  Goal: Optimal Comfort and Wellbeing  Outcome: Ongoing, Progressing  Goal: Readiness for Transition of Care  Outcome: Ongoing, Progressing     Problem: Diabetes Comorbidity  Goal: Blood Glucose Level Within Targeted Range  Outcome: Ongoing, Progressing     Problem: Fall Injury Risk  Goal: Absence of Fall and Fall-Related Injury  Outcome: Ongoing, Progressing     Problem: Hemodynamic Instability (Hemodialysis)  Goal: Effective Tissue Perfusion  Outcome: Ongoing, Not Progressing    PT alert and oriented x 2. Disoriented to place and time. He was aware that it is 2023.  Able to voice all wants and needs. All needs met.  He has remained free of falls and injuries. Cont to need to receive dialysis. He is noted to have bleeding at fistula to left upper arm. Stated it had been bleeding and that the ED wrapped it. Safety eduction and plan of care reviewed with PT and he voiced understanding. Bed is low and locked with call light with in easy reach.  Bed alarm set

## 2023-03-11 NOTE — ASSESSMENT & PLAN NOTE
Results for orders placed during the hospital encounter of 11/18/22  Echo  · The left ventricle is normal in size with mild concentric hypertrophy and increased density and normal systolic function.  · The estimated ejection fraction is 63%.  · Grade II left ventricular diastolic dysfunction.  · Severe left atrial enlargement.  · Mild right ventricular enlargement with low normal right ventricular systolic function.  · There is right ventricular hypertrophy.  · Moderate right atrial enlargement.  · Mild to moderate tricuspid regurgitation.  · Mild pulmonic regurgitation.  · Elevated central venous pressure (15 mmHg).  · The estimated PA systolic pressure is 67 mmHg.  · There is moderate-severe pulmonary hypertension.  · Trivial posterolateral pericardial effusion.  · There is a moderate left pleural effusion.  · There may be some ascites present.    Patient home medications include carvedilol and IMDUR  - continue home medications  - nephrology consulted for HD  - continue home O2  - strict I&O  - fluid restrict to 1.5 liter on cardiac diet

## 2023-03-11 NOTE — CONSULTS
Consultation Report  Ophthalmology Service    Date: 03/10/2023    Chief complaint/Reason for Consult: Right-greater-than-left periorbital swelling.  Suspect anasarca but can not rule out primary orbital process.     History of Present Illness: Cosme Lewis is a 57 y.o. male with no POHx  who presents with facial and eyelid swelling.  Patient missed HD session three days ago and since then has had facial and b/l upper eyelid swelling. It is not painful, though he does have FBS on the right eye.  Denies pain with EOM, change in vision, loss of vision, discharge, eye redness.  PMHx poor controlled T1DM now on HD.       POcularHx: Denies history of ocular problems or past ocular surgeries.    Current eye gtts: Denies     Family Hx: Denies family history of glaucoma, macular degeneration, or blindness. family history includes Diabetes in his mother.     PMHx:  has a past medical history of Chronic kidney disease-mineral and bone disorder (10/12/2022), COPD (chronic obstructive pulmonary disease), Diabetes mellitus type 1, ESRD on dialysis, Hypertension, Hypervolemia (2/22/2023), Hyponatremia (3/4/2023), and SOB (shortness of breath) (10/12/2022).     PSurgHx:  has a past surgical history that includes AV fistula placement.     Home Medications:   Prior to Admission medications    Medication Sig Start Date End Date Taking? Authorizing Provider   acetaminophen (TYLENOL) 650 MG TbSR Take 650 mg by mouth every 8 (eight) hours as needed (pain or fever over 100.4).    Historical Provider   albuterol (PROVENTIL/VENTOLIN HFA) 90 mcg/actuation inhaler Inhale 2 puffs into the lungs every 6 (six) hours as needed for Wheezing or Shortness of Breath. 9/2/22   Historical Provider   albuterol-ipratropium (DUO-NEB) 2.5 mg-0.5 mg/3 mL nebulizer solution Take 3 mLs by nebulization every 4 (four) hours while awake. Rescue 2/23/23 2/23/24  Marychuy Tam PA-C   apixaban (ELIQUIS) 5 mg Tab Take 5 mg by mouth 2 (two) times daily.     Historical Provider   aspirin (ECOTRIN) 81 MG EC tablet Take 81 mg by mouth once daily. 9/28/22   Historical Provider   atorvastatin (LIPITOR) 40 MG tablet Take 1 tablet (40 mg total) by mouth once daily. 10/15/22 10/15/23  Odessa Borges DO   carvediloL (COREG) 3.125 MG tablet Take 2 tablets (6.25 mg total) by mouth 2 (two) times daily. 11/23/22 11/23/23  Britt Mason MD   cetirizine (ZYRTEC) 10 MG tablet Take 10 mg by mouth daily as needed (itching).    Historical Provider   cetirizine (ZYRTEC) 5 MG tablet Take 1 tablet (5 mg total) by mouth daily as needed (itching). 3/7/23 3/6/24  Dahlia Balbuena MD   cloNIDine 0.3 mg/24 hr td ptwk (CATAPRES) 0.3 mg/24 hr Place 1 patch onto the skin every Saturday. 9/8/22   Historical Provider   erythromycin (ROMYCIN) ophthalmic ointment Place a 1/2 inch ribbon of ointment into the lower eyelid three times a day. 2/10/23   Angela Mejia MD   FLUoxetine 40 MG capsule Take 40 mg by mouth once daily. 10/2/22   Historical Provider   fluticasone-salmeterol diskus inhaler 250-50 mcg Inhale 1 puff into the lungs 2 (two) times daily. 6/7/22   Historical Provider   GLUCAGON EMERGENCY KIT, HUMAN, 1 mg injection 1 mg into the muscle as needed if CBG < 70 9/2/22   Historical Provider   hydrALAZINE (APRESOLINE) 100 MG tablet Take 1 tablet (100 mg total) by mouth every 8 (eight) hours. 11/23/22   Britt Mason MD   HYDROPHILIC CREAM TOP Apply liberal amount to affected area twice daily for dry skin    Historical Provider   insulin aspart U-100 (NOVOLOG) 100 unit/mL (3 mL) InPn pen Inject 5 Units into the skin 3 (three) times daily with meals. (Plus sliding scale) 3/5/23 3/4/24  Hien Wright MD   insulin detemir U-100 (LEVEMIR FLEXTOUCH) 100 unit/mL (3 mL) SubQ InPn pen Inject 20 Units into the skin every morning and 15 units in the evening. 3/5/23   Hien Wright MD   insulin detemir U-100 (LEVEMIR FLEXTOUCH) 100 unit/mL (3 mL) SubQ InPn pen Inject 7 Units into the skin 2  (two) times daily. 3/7/23 3/6/24  Dahlia Balbuena MD   insulin lispro (HUMALOG U-100 INSULIN) 100 unit/mL injection Inject 5 Units into the skin 3 (three) times daily before meals. 3/7/23 3/6/24  Dahlia Balbuena MD   isosorbide mononitrate (IMDUR) 30 MG 24 hr tablet Take 30 mg by mouth 2 (two) times daily. 10/10/22   Historical Provider   melatonin 3 mg TbDL Take 9 mg by mouth nightly as needed (sleep).    Historical Provider   NIFEdipine (PROCARDIA-XL) 90 MG (OSM) 24 hr tablet Take 1 tablet (90 mg total) by mouth once daily. 11/30/22 11/30/23  Landon Darby PA-C   nut.tx.imp.renal fxn,lac-reduc (NEPRO CARB STEADY ORAL) Give 1 carton by mouth with each meal.    Historical Provider   sodium chloride (OCEAN) 0.65 % nasal spray 2 sprays by Nasal route every 4 (four) hours as needed for Congestion.    Historical Provider   tiotropium bromide (SPIRIVA RESPIMAT) 2.5 mcg/actuation inhaler Inhale 2 puffs into the lungs Daily. 9/2/22   Historical Provider   triamcinolone acetonide 0.025 % Lotn Apply topically. Apply to the affected area twice daily    Historical Provider   vitamin renal formula, B-complex-vitamin c-folic acid, (NEPHROCAP) 1 mg Cap Take 1 capsule by mouth once daily. 9/28/22   Historical Provider        Medications this encounter:    apixaban  5 mg Oral BID    [START ON 3/11/2023] aspirin  81 mg Oral Daily    [START ON 3/11/2023] atorvastatin  40 mg Oral Daily    carvediloL  6.25 mg Oral BID    [START ON 3/11/2023] FLUoxetine  40 mg Oral Daily    isosorbide mononitrate  30 mg Oral BID    [START ON 3/11/2023] NIFEdipine  90 mg Oral Daily    [START ON 3/11/2023] tiotropium bromide  2 puff Inhalation Daily       Allergies: has No Known Allergies.     Social:  reports that he has never smoked. He has never used smokeless tobacco. He reports that he does not currently use alcohol.     ROS: As per HPI    Ocular examination/Dilated fundus examination:  Base Eye Exam       Visual Acuity (Snellen - Linear)          Right Left    Dist sc 20/50 20/50    Dist ph sc 20/30 20/40              Tonometry (Tonopen, 8:01 PM)         Right Left    Pressure 18 19              Pupils         Dark Light Shape React APD    Right 4 2 Round Brisk None    Left 4 2 Round Brisk None              Visual Fields         Right Left     Full Full              Extraocular Movement         Right Left     Full, Ortho Full, Ortho   painless             Dilation       Both eyes: 1% Mydriacyl, 2.5% Phenylephrine @ 8:01 PM                  Slit Lamp and Fundus Exam       External Exam         Right Left    External Facial edema Facial edema              Slit Lamp Exam         Right Left    Lids/Lashes Soft, cool pitting edema to UL. No redness, TTP, discharge Tr UL pitting edema    Conjunctiva/Sclera CAM, conjunctival chalasis CAM, conjunctival chalasis    Cornea arcus, otherwise clear arcus, otherwise clear    Anterior Chamber shallow, formed shallow, formed    Iris Round and reactive, no obvious NV Round and reactive, no obvious NV    Lens NSC NSC    Anterior Vitreous PVD               Fundus Exam         Right Left    Disc Normal Normal    C/D Ratio 0.4 0.4    Macula Flat, early ERM Flat, ERM vs old HE    Vessels attenuated, ischemic attenuated, ischemic    Periphery scattered soft drusen, DBH, IRH, no NV . Flat and attached scattered soft drusen, DBH, IRH, no NV . Flat and attached                  CT orbits:  Impression:     Diffuse infiltration and edema throughout the fat of the visualized face including the orbits.  No acute facial fracture.  The paranasal sinuses are clear.  It is unclear whether this reflects cellulitis or anasarca..    Assessment/Plan:     1. Anasarca, eyelid involving, R > L  - concern for preseptal vs orbital cellulitis given CT findings, however simply soft pitting edema. No signs or concern for cellulitis. Align with full body anasarca present.     2. T1DM with mild NPDR  - La1c 9.5%  - no acute intervention  - impt for  patient to have outpt f/u with retina specialist. I will message schedulers to arrange      I will contact clinic scheduler to arrange follow up with patient.     Patient's Best Contact Number: [phone]    Kemal Hedrick MD PGY-2  [employer] Ophthalmology Resident  03/10/2023  8:03 PM

## 2023-03-11 NOTE — PROGRESS NOTES
HD TX completed.  Ran for 3 hrs.  Net fluid removed is 3.5 liters.  VSS.  Tolerated dialysis well.  Report given to primary nurse.

## 2023-03-11 NOTE — NURSING
Bedside handoff complete. Pt received in no apparent distress at this time. Routine assessment performed. AO x 3. WCTM.

## 2023-03-11 NOTE — ASSESSMENT & PLAN NOTE
onel to contact the office reg 's response below.   Patient presents with 3 days of orbital and periorbital edema right > left with associated blurry vision  No leukocytosis, afebrile, and HDS  Ophthalmology consulted in the ED, normal intraocular pressures  - CT orbits: facial edema including orbits     - appreciate ophthalmology assistance   - not concerned for cellulitis   - retina specialist follow up outpatient  - f/u cultures   - consider broad spectrum abx if imaging, cultures, labs or vitals become concerning for infection

## 2023-03-11 NOTE — HPI
Mr Lewis is a 57 year old male with a pmh of DM1, COPD on home oxygen, and ESRD on iHD MWF who presents from his nursing home with chief complaint of N/V x 1 day and R eye swelling.  We were consulted for bleeding after a HD stock site. There is a very slight ooze coming from the needle stick.

## 2023-03-11 NOTE — SUBJECTIVE & OBJECTIVE
Medications Prior to Admission   Medication Sig Dispense Refill Last Dose    apixaban (ELIQUIS) 5 mg Tab Take 5 mg by mouth 2 (two) times daily.   3/10/2023    aspirin (ECOTRIN) 81 MG EC tablet Take 81 mg by mouth once daily.   3/10/2023    atorvastatin (LIPITOR) 40 MG tablet Take 1 tablet (40 mg total) by mouth once daily. 90 tablet 3 3/10/2023    carvediloL (COREG) 3.125 MG tablet Take 2 tablets (6.25 mg total) by mouth 2 (two) times daily. 120 tablet 11 3/10/2023    cetirizine (ZYRTEC) 10 MG tablet Take 10 mg by mouth daily as needed (itching).   3/10/2023    cetirizine (ZYRTEC) 5 MG tablet Take 1 tablet (5 mg total) by mouth daily as needed (itching). 30 tablet 0 3/10/2023    isosorbide mononitrate (IMDUR) 30 MG 24 hr tablet Take 30 mg by mouth 2 (two) times daily.   3/10/2023    acetaminophen (TYLENOL) 650 MG TbSR Take 650 mg by mouth every 8 (eight) hours as needed (pain or fever over 100.4).   Unknown    albuterol (PROVENTIL/VENTOLIN HFA) 90 mcg/actuation inhaler Inhale 2 puffs into the lungs every 6 (six) hours as needed for Wheezing or Shortness of Breath.   Unknown    albuterol-ipratropium (DUO-NEB) 2.5 mg-0.5 mg/3 mL nebulizer solution Take 3 mLs by nebulization every 4 (four) hours while awake. Rescue 6480 mL 0     cloNIDine 0.3 mg/24 hr td ptwk (CATAPRES) 0.3 mg/24 hr Place 1 patch onto the skin every Saturday.   Unknown    erythromycin (ROMYCIN) ophthalmic ointment Place a 1/2 inch ribbon of ointment into the lower eyelid three times a day. 3.5 g 0 Unknown    FLUoxetine 40 MG capsule Take 40 mg by mouth once daily.   Unknown    fluticasone-salmeterol diskus inhaler 250-50 mcg Inhale 1 puff into the lungs 2 (two) times daily.   Unknown    GLUCAGON EMERGENCY KIT, HUMAN, 1 mg injection 1 mg into the muscle as needed if CBG < 70   Unknown    hydrALAZINE (APRESOLINE) 100 MG tablet Take 1 tablet (100 mg total) by mouth every 8 (eight) hours.   Unknown    HYDROPHILIC CREAM TOP Apply liberal amount to affected  area twice daily for dry skin   Unknown    insulin aspart U-100 (NOVOLOG) 100 unit/mL (3 mL) InPn pen Inject 5 Units into the skin 3 (three) times daily with meals. (Plus sliding scale) 15 mL 3 Unknown    insulin detemir U-100 (LEVEMIR FLEXTOUCH) 100 unit/mL (3 mL) SubQ InPn pen Inject 20 Units into the skin every morning and 15 units in the evening. 12 mL 3 Unknown    insulin detemir U-100 (LEVEMIR FLEXTOUCH) 100 unit/mL (3 mL) SubQ InPn pen Inject 7 Units into the skin 2 (two) times daily. 4.2 mL 11 Unknown    insulin lispro (HUMALOG U-100 INSULIN) 100 unit/mL injection Inject 5 Units into the skin 3 (three) times daily before meals. 4.5 mL 11 Unknown    melatonin 3 mg TbDL Take 9 mg by mouth nightly as needed (sleep).   Unknown    NIFEdipine (PROCARDIA-XL) 90 MG (OSM) 24 hr tablet Take 1 tablet (90 mg total) by mouth once daily. 30 tablet 11 Unknown    nut.tx.imp.renal fxn,lac-reduc (NEPRO CARB STEADY ORAL) Give 1 carton by mouth with each meal.   Unknown    sodium chloride (OCEAN) 0.65 % nasal spray 2 sprays by Nasal route every 4 (four) hours as needed for Congestion.   Unknown    tiotropium bromide (SPIRIVA RESPIMAT) 2.5 mcg/actuation inhaler Inhale 2 puffs into the lungs Daily.   Unknown    triamcinolone acetonide 0.025 % Lotn Apply topically. Apply to the affected area twice daily   Unknown    vitamin renal formula, B-complex-vitamin c-folic acid, (NEPHROCAP) 1 mg Cap Take 1 capsule by mouth once daily.   Unknown       Review of patient's allergies indicates:  No Known Allergies    Past Medical History:   Diagnosis Date    Chronic kidney disease-mineral and bone disorder 10/12/2022    COPD (chronic obstructive pulmonary disease)     Diabetes mellitus type 1     ESRD on dialysis     Hypertension     Hypervolemia 2/22/2023    Hyponatremia 3/4/2023    SOB (shortness of breath) 10/12/2022    Patient with history COPD treated with Ellipta the albuterol, fluticasone-salmeterol tachypneic in the ED saturating 94%  and 6 L on nasal cannula, patient does not know how many  he uses it is in nursing home.    -duo nebs Q 4  Given that patient is a poor historian, and in the setting of left lower extremity edema and history of coagulopathy PE cannot be ruled out.  Will workup for possible infec     Past Surgical History:   Procedure Laterality Date    AV FISTULA PLACEMENT       Family History       Problem Relation (Age of Onset)    Diabetes Mother          Tobacco Use    Smoking status: Never    Smokeless tobacco: Never   Substance and Sexual Activity    Alcohol use: Not Currently    Drug use: Not on file    Sexual activity: Not Currently     Review of Systems   Constitutional: Negative.    All other systems reviewed and are negative.  Objective:     Vital Signs (Most Recent):  Temp: 97.8 °F (36.6 °C) (03/11/23 0745)  Pulse: 73 (03/11/23 0745)  Resp: 20 (03/11/23 0745)  BP: (!) 179/91 (03/11/23 0745)  SpO2: (!) 92 % (03/11/23 0745)   Vital Signs (24h Range):  Temp:  [97 °F (36.1 °C)-98.3 °F (36.8 °C)] 97.8 °F (36.6 °C)  Pulse:  [60-74] 73  Resp:  [16-20] 20  SpO2:  [90 %-97 %] 92 %  BP: (140-183)/(64-92) 179/91     Weight: 70.4 kg (155 lb 3.3 oz)  Body mass index is 22.92 kg/m².    Physical Exam  Vitals reviewed.   Constitutional:       Appearance: He is ill-appearing.   Cardiovascular:      Comments: Good thrill in BB AVF  Abdominal:      General: Abdomen is flat.   Musculoskeletal:         General: Normal range of motion.   Skin:     General: Skin is warm and dry.      Capillary Refill: Capillary refill takes less than 2 seconds.   Neurological:      General: No focal deficit present.      Mental Status: He is alert and oriented to person, place, and time.       Significant Labs:  CBC:   Recent Labs   Lab 03/11/23 0313   WBC 3.74*   RBC 3.45*   HGB 10.2*   HCT 33.0*      MCV 96   MCH 29.6   MCHC 30.9*     CMP:   Recent Labs   Lab 03/11/23 0313   *   CALCIUM 8.6*   ALBUMIN 2.8*   PROT 6.2      K 5.2*   CO2  24   CL 98   BUN 46*   CREATININE 4.6*   ALKPHOS 262*   ALT 44   AST 74*   BILITOT 0.8       Significant Diagnostics:  I have reviewed all pertinent imaging results/findings within the past 24 hours.

## 2023-03-11 NOTE — SUBJECTIVE & OBJECTIVE
Interval History: AVF to KATEE noted to be oozing blood, significantly distended, and with distal arm edema. Patient reports resolution of nausea/vomiting and reports significant hunger even after eating full breakfast, will double his portions.     Review of Systems  Objective:     Vital Signs (Most Recent):  Temp: 97.5 °F (36.4 °C) (03/11/23 1330)  Pulse: 67 (03/11/23 1415)  Resp: 20 (03/11/23 0745)  BP: (!) 162/89 (03/11/23 1415)  SpO2: (!) 93 % (03/11/23 1330)   Vital Signs (24h Range):  Temp:  [97 °F (36.1 °C)-98.3 °F (36.8 °C)] 97.5 °F (36.4 °C)  Pulse:  [67-74] 67  Resp:  [16-20] 20  SpO2:  [90 %-95 %] 93 %  BP: (159-183)/(75-96) 162/89     Weight: 70.4 kg (155 lb 3.3 oz)  Body mass index is 22.92 kg/m².    Intake/Output Summary (Last 24 hours) at 3/11/2023 1458  Last data filed at 3/11/2023 0955  Gross per 24 hour   Intake 240 ml   Output --   Net 240 ml      Physical Exam  Vitals and nursing note reviewed.   Constitutional:       General: He is not in acute distress.     Appearance: Normal appearance. He is not ill-appearing or diaphoretic.   HENT:      Head: Normocephalic and atraumatic.      Right Ear: External ear normal.      Left Ear: External ear normal.      Nose: Nose normal.      Mouth/Throat:      Mouth: Mucous membranes are moist.   Eyes:      General: No scleral icterus.        Right eye: No discharge.         Left eye: No discharge.      Conjunctiva/sclera: Conjunctivae normal.      Comments: Bilateral periorbital and orbital edema, R>L  Mildly TTP    Cardiovascular:      Rate and Rhythm: Normal rate and regular rhythm.      Heart sounds: Normal heart sounds. No murmur heard.  Pulmonary:      Effort: Pulmonary effort is normal. No respiratory distress.      Breath sounds: Normal breath sounds. No wheezing or rales.   Abdominal:      General: There is distension.      Palpations: Abdomen is soft. There is no mass.      Tenderness: There is no abdominal tenderness.   Musculoskeletal:          General: Swelling present. No deformity. Normal range of motion.      Cervical back: Normal range of motion and neck supple.      Right lower leg: Edema (1+) present.      Left lower leg: Edema (1+) present.   Skin:     General: Skin is warm.      Coloration: Skin is not jaundiced.      Findings: No bruising.      Comments: AVF on LUE oozing blood from needle sites, significantly enlarged/bulging, and with distal arm edema   Neurological:      Mental Status: He is alert and oriented to person, place, and time.   Psychiatric:         Mood and Affect: Mood normal.         Behavior: Behavior normal.         Thought Content: Thought content normal.       Significant Labs: All pertinent labs within the past 24 hours have been reviewed.    Significant Imaging: I have reviewed all pertinent imaging results/findings within the past 24 hours.

## 2023-03-11 NOTE — ASSESSMENT & PLAN NOTE
Blood pressure 140/64 on arrival  Home regiment: clonidine, hydralazine, nifedipine, IMDUR, and coreg    - resume home regiment

## 2023-03-11 NOTE — NURSING
2210: PT arrived to unit via transport and stretcher. Aao x 2. Able to slide self from stretcher to bed with assist x 1 staff. Able to voice all wants and needs. States he is unable to ambulate and uses a WC at the NH for mobility. PT noted to have a large BM was cleaned and dried per staff. Care plan reviewed with PT and safety education completed. PT oriented to room. Bed low and locked with call light with in easy reach. Bed alarm set.

## 2023-03-11 NOTE — ASSESSMENT & PLAN NOTE
Patient's FSGs are controlled on current medication regimen.  Last A1c reviewed-   Lab Results   Component Value Date    HGBA1C 9.5 (H) 03/06/2023     Most recent fingerstick glucose reviewed- No results for input(s): POCTGLUCOSE in the last 24 hours.  Current correctional scale  Medium  Maintain anti-hyperglycemic dose as follows-   Antihyperglycemics (From admission, onward)    Start     Stop Route Frequency Ordered    03/10/23 1954  insulin aspart U-100 pen 1-10 Units         -- SubQ Before meals & nightly PRN 03/10/23 1854        Hold Oral hypoglycemics while patient is in the hospital.  Consider resuming patient on determir BID and aspart with meals if not controlled on SSI  POCT [facility]

## 2023-03-12 NOTE — ASSESSMENT & PLAN NOTE
Hx of pulmonary thromboembolism within a lobar branch to the right lower lobe noting additional pulmonary thromboembolism within segmental branches to the right upper lobe in 10/2022    CTA on 3/12/23 negative for acute or chronic PE  BLE DVT US negative    - resume home eliquis

## 2023-03-12 NOTE — PLAN OF CARE
Problem: Adult Inpatient Plan of Care  Goal: Plan of Care Review  Outcome: Ongoing, Progressing  Goal: Patient-Specific Goal (Individualized)  Outcome: Ongoing, Progressing  Goal: Absence of Hospital-Acquired Illness or Injury  Outcome: Ongoing, Progressing  Goal: Optimal Comfort and Wellbeing  Outcome: Ongoing, Progressing  Goal: Readiness for Transition of Care  Outcome: Ongoing, Progressing     Problem: Diabetes Comorbidity  Goal: Blood Glucose Level Within Targeted Range  Outcome: Ongoing, Progressing     Problem: Skin Injury Risk Increased  Goal: Skin Health and Integrity  Outcome: Ongoing, Progressing     Problem: Fall Injury Risk  Goal: Absence of Fall and Fall-Related Injury  Outcome: Ongoing, Progressing     Problem: Device-Related Complication Risk (Hemodialysis)  Goal: Safe, Effective Therapy Delivery  Outcome: Ongoing, Progressing     Problem: Hemodynamic Instability (Hemodialysis)  Goal: Effective Tissue Perfusion  Outcome: Ongoing, Progressing     Problem: Infection (Hemodialysis)  Goal: Absence of Infection Signs and Symptoms  Outcome: Ongoing, Progressing   He has had a good day. Tolerated dialysis well.  His appetite is good.  No sign of distress.  Safety precautions remain in place.

## 2023-03-12 NOTE — ASSESSMENT & PLAN NOTE
AVF to LUE oozing blood from needle sites, significantly enlarged/buldging, and with distal arm edema    - vascular surgery consulted, appreciate assistance   - f/u AVF US   - perform HD using AVF

## 2023-03-12 NOTE — ASSESSMENT & PLAN NOTE
Lab Results   Component Value Date    HGBA1C 9.5 (H) 03/06/2023     Recent Labs   Lab 03/11/23  1627 03/11/23  2135 03/12/23  1339   POCTGLUCOSE 196* 310* 262*     Current correctional scale  Medium  Maintain anti-hyperglycemic dose as follows-   Antihyperglycemics (From admission, onward)    Start     Stop Route Frequency Ordered    03/12/23 1645  insulin aspart U-100 pen 4 Units         -- SubQ 3 times daily with meals 03/12/23 1510    03/12/23 1337  insulin aspart U-100 pen 0-5 Units         -- SubQ Every 6 hours PRN 03/12/23 1237    03/11/23 2100  insulin detemir U-100 pen 5 Units         -- SubQ 2 times daily 03/11/23 1457        Hold Oral hypoglycemics while patient is in the hospital.  POCT [facility]

## 2023-03-12 NOTE — PROGRESS NOTES
Nick Menjivar - Telemetry [facility] Medicine  Progress Note    Patient Name: Cosme Lewis  MRN: [id number]  Patient Class: IP- Inpatient   Admission Date: 3/10/2023  Length of Stay: 0 days  Attending Physician: Robbie Smith MD  Primary Care Provider: Primary Doctor No        Subjective:     Principal Problem:Acute on chronic respiratory failure with hypoxia        HPI:  Cosme Lewis is a 57 y.o. with CKD on HD MWF, DM1, Renovascular HTN, Chronic respiratory failure (on 3L of home O2), HFpEF, and Mild malnutrition who presents to the ED for vomiting and eye swelling. He began feeling nauseated with SOB, LE swelling, abdominal swelling, and facial swelling 3 days ago. He reports blurry vision associated with his eye swelling and discomfort. He has also had dizziness since the onset of symptoms. He reports vomiting twice today but denies blood in his vomit. Denies fever, chills, diarrhea, cough, dysuria, and chest pain. He still produces urine and his last complete session of HD was on 3/8 as he missed HD today due to feeling ill.       Of note, patient recently hospitalized in the MICU here and discharged on 3/7 to [facility] for hypertensive emergency and acute hypoxic respiratory failure.    In the ED, patient arrived afebrile and HDS satting well on RA. Labs remarkable for anemia 11 at baseline, elevated alk phos 261, normal lactate, and normal lipase. CT of the orbits demonstrated diffuse facial edema. CT A/P demonstrated pleural effusions, pulmonary edema, anasarca, and ascites.     Patient admitted to hospital medicine.       Overview/Hospital Course:  Patient admitted for volume overload 2/2 missed HD session. Ophthalmology consulted, is not concerned for cellulitis at this time as imaging and clinical picture more consistent with edema. Nephrology consulted and assisting with HD coordination. On 3/11, patient noted to have oozing from AVF on LUE with distal arm edema. Vascular surgery consulted,  recommended holding eliquis and getting US of AVF with further evaluation to be done on 3/13. HD was performed on 3/11 with 3.5L removed. Patient developed acute hypoxic respiratory failure on 3/12 with an increased O2 requirement to 12L. CTA negative for PE but with GGO suggestive of volume overload. Pt noted to have vomitus w c/f possible aspiration PNA vs pneumonitis. Glucose control has been a challenge given non-compliance to diet.       Interval History: Patient noted to be in respiratory distress with tachypnea, increased WOB, and an acute increase in O2 requirement from 3L to 12L. Pt noted to have vomitus in bed. [place] called to bedside to assist, ABG performed. Noted to have bloody nose, NC transitioned to facemask.     Review of Systems  Objective:     Vital Signs (Most Recent):  Temp: 97 °F (36.1 °C) (03/12/23 1346)  Pulse: 65 (03/12/23 1420)  Resp: 20 (03/12/23 1346)  BP: (!) 181/89 (03/12/23 1346)  SpO2: 97 % (03/12/23 1420)   Vital Signs (24h Range):  Temp:  [97 °F (36.1 °C)-98.4 °F (36.9 °C)] 97 °F (36.1 °C)  Pulse:  [64-80] 65  Resp:  [15-20] 20  SpO2:  [87 %-99 %] 97 %  BP: (156-181)/(77-89) 181/89     Weight: 70.4 kg (155 lb 3.3 oz)  Body mass index is 22.92 kg/m².    Intake/Output Summary (Last 24 hours) at 3/12/2023 1458  Last data filed at 3/11/2023 1805  Gross per 24 hour   Intake 550 ml   Output 4100 ml   Net -3550 ml        Physical Exam  Vitals and nursing note reviewed.   Constitutional:       General: He is in acute distress.      Appearance: Normal appearance. He is ill-appearing. He is not diaphoretic.   HENT:      Head: Normocephalic and atraumatic.      Right Ear: External ear normal.      Left Ear: External ear normal.      Nose:      Comments: Blood in nares  NC in place     Mouth/Throat:      Mouth: Mucous membranes are moist.   Eyes:      General: No scleral icterus.        Right eye: No discharge.         Left eye: No discharge.      Comments: Bilateral periorbital and orbital  edema, R>L (improved)   Cardiovascular:      Rate and Rhythm: Normal rate and regular rhythm.      Heart sounds: Normal heart sounds. No murmur heard.  Pulmonary:      Effort: Respiratory distress present.      Breath sounds: Rales present.   Abdominal:      General: There is distension.      Palpations: Abdomen is soft. There is no mass.      Tenderness: There is no abdominal tenderness.   Musculoskeletal:         General: Swelling present. No deformity. Normal range of motion.      Cervical back: Normal range of motion and neck supple.      Right lower leg: Edema (1+) present.      Left lower leg: Edema (1+) present.   Skin:     General: Skin is warm.      Coloration: Skin is not jaundiced.      Findings: No bruising.      Comments: AVF on LUE significantly enlarged/bulging with improvement in distal arm edema   Neurological:      Mental Status: He is alert and oriented to person, place, and time.   Psychiatric:         Mood and Affect: Mood normal.         Behavior: Behavior normal.         Thought Content: Thought content normal.       Significant Labs: All pertinent labs within the past 24 hours have been reviewed.    Significant Imaging: I have reviewed all pertinent imaging results/findings within the past 24 hours.      Assessment/Plan:      * Acute on chronic respiratory failure with hypoxia  Patient with Hypoxic Respiratory failure which is Acute on chronic.  he is on home oxygen at 3 LPM. Supplemental oxygen was provided and noted- Oxygen Concentration (%):  [35] 35.   Signs/symptoms of respiratory failure include- tachypnea, increased work of breathing, respiratory distress, use of accessory muscles and increased O2 requirement. Contributing diagnoses includes - ARDS, Aspiration, CHF, Pleural effusion, Pneumonia and Pulmonary Embolus Labs and images were reviewed. Patient Has recent ABG, which has been reviewed. Will treat underlying causes and adjust management of respiratory failure as follows.    CTA  negative for PE but with GGO suggesting volume overload with atelectasis and bilateral pleural effusions  BLE DVT US negative    - increased O2 requirement from 3L to 12L on facemask, wean down O2 as tolerated  - nephrology consulted for HD for further volume removal, rec CRRT if necessary for respiratory distress as the HD unit is backed up  - resumed home eliquis 2/2 cf developing PE/DVT  - aspiration precautions  - SLP evaluated, recommending level 6 dysphagia diet    AVF (arteriovenous fistula)  AVF to LUE oozing blood from needle sites, significantly enlarged/buldging, and with distal arm edema    - vascular surgery consulted, appreciate assistance   - f/u AVF US   - perform HD using AVF    Orbital edema  Patient presents with 3 days of orbital and periorbital edema right > left with associated blurry vision  No leukocytosis, afebrile, and HDS  Ophthalmology consulted in the ED, normal intraocular pressures  - CT orbits: facial edema including orbits     - appreciate ophthalmology assistance   - not concerned for cellulitis   - retina specialist follow up outpatient  - f/u cultures   - consider broad spectrum abx if imaging, cultures, labs or vitals become concerning for infection    Improving    Type 2 diabetes mellitus with hyperglycemia, with long-term current use of insulin    Lab Results   Component Value Date    HGBA1C 9.5 (H) 03/06/2023     Recent Labs   Lab 03/11/23  1627 03/11/23  2135 03/12/23  1339   POCTGLUCOSE 196* 310* 262*     Current correctional scale  Medium  Maintain anti-hyperglycemic dose as follows-   Antihyperglycemics (From admission, onward)    Start     Stop Route Frequency Ordered    03/12/23 1645  insulin aspart U-100 pen 4 Units         -- SubQ 3 times daily with meals 03/12/23 1510    03/12/23 1337  insulin aspart U-100 pen 0-5 Units         -- SubQ Every 6 hours PRN 03/12/23 1237    03/11/23 2100  insulin detemir U-100 pen 5 Units         -- SubQ 2 times daily 03/11/23 4047         Hold Oral hypoglycemics while patient is in the hospital.  POCT ACQHS    HLD (hyperlipidemia)  Continue home statin      Renovascular hypertension  Blood pressure 140/64 on arrival  Home regiment: clonidine, hydralazine, nifedipine, IMDUR, and coreg    - resume home regiment      Anemia in ESRD (end-stage renal disease)  Hb 11.1 on arrival near baseline.  No active signs or patient reports of bleeding.    - monitor CBC daily  - transfuse as needed for Hb < 7      Multiple subsegmental pulmonary emboli without acute cor pulmonale  Hx of pulmonary thromboembolism within a lobar branch to the right lower lobe noting additional pulmonary thromboembolism within segmental branches to the right upper lobe in 10/2022    CTA on 3/12/23 negative for acute or chronic PE  BLE DVT US negative    - resume home eliquis    Chronic diastolic heart failure  Results for orders placed during the hospital encounter of 11/18/22  Echo  · The left ventricle is normal in size with mild concentric hypertrophy and increased density and normal systolic function.  · The estimated ejection fraction is 63%.  · Grade II left ventricular diastolic dysfunction.  · Severe left atrial enlargement.  · Mild right ventricular enlargement with low normal right ventricular systolic function.  · There is right ventricular hypertrophy.  · Moderate right atrial enlargement.  · Mild to moderate tricuspid regurgitation.  · Mild pulmonic regurgitation.  · Elevated central venous pressure (15 mmHg).  · The estimated PA systolic pressure is 67 mmHg.  · There is moderate-severe pulmonary hypertension.  · Trivial posterolateral pericardial effusion.  · There is a moderate left pleural effusion.  · There may be some ascites present.    Patient home medications include carvedilol and IMDUR  - continue home medications  - nephrology consulted for HD  - continue home O2  - strict I&O  - fluid restrict to 1.5 liter on cardiac diet    ESRD on dialysis  HD MWF with AVF  to LUE  Patient last HD session on 3/8, missed session on 3/10 due to feeling ill  Still produces urine  Electrolytes stable and no urgent need for HD at this time although is volume overloaded and will need HD soon    - nephrology consulted for inpatient HD coordination, appreciate assistance  - renally dose all medications  - avoid nephrotoxic agents  - strict I&O  - renal diet fluid restricted to 1.5L  - monitor renal function daily      VTE Risk Mitigation (From admission, onward)         Ordered     apixaban tablet 5 mg  2 times daily         03/12/23 1101     IP VTE HIGH RISK PATIENT  Once         03/10/23 1815     Place sequential compression device  Until discontinued         03/10/23 1812                Discharge Planning   GERA: 3/13/2023     Code Status: Full Code   Is the patient medically ready for discharge?: No    Reason for patient still in hospital (select all that apply): Patient new problem, Patient trending condition, Treatment, Imaging and Consult recommendations                     Cecilia Dorman MD  Department of Hospital Medicine   Nick Menjivar - Telemetry Stepdown

## 2023-03-12 NOTE — SUBJECTIVE & OBJECTIVE
Interval History: Patient noted to be in respiratory distress with tachypnea, increased WOB, and an acute increase in O2 requirement from 3L to 12L. Pt noted to have vomitus in bed. [place] called to bedside to assist, ABG performed. Noted to have bloody nose, NC transitioned to facemask.     Review of Systems  Objective:     Vital Signs (Most Recent):  Temp: 97 °F (36.1 °C) (03/12/23 1346)  Pulse: 65 (03/12/23 1420)  Resp: 20 (03/12/23 1346)  BP: (!) 181/89 (03/12/23 1346)  SpO2: 97 % (03/12/23 1420)   Vital Signs (24h Range):  Temp:  [97 °F (36.1 °C)-98.4 °F (36.9 °C)] 97 °F (36.1 °C)  Pulse:  [64-80] 65  Resp:  [15-20] 20  SpO2:  [87 %-99 %] 97 %  BP: (156-181)/(77-89) 181/89     Weight: 70.4 kg (155 lb 3.3 oz)  Body mass index is 22.92 kg/m².    Intake/Output Summary (Last 24 hours) at 3/12/2023 1458  Last data filed at 3/11/2023 1805  Gross per 24 hour   Intake 550 ml   Output 4100 ml   Net -3550 ml        Physical Exam  Vitals and nursing note reviewed.   Constitutional:       General: He is in acute distress.      Appearance: Normal appearance. He is ill-appearing. He is not diaphoretic.   HENT:      Head: Normocephalic and atraumatic.      Right Ear: External ear normal.      Left Ear: External ear normal.      Nose:      Comments: Blood in nares  NC in place     Mouth/Throat:      Mouth: Mucous membranes are moist.   Eyes:      General: No scleral icterus.        Right eye: No discharge.         Left eye: No discharge.      Comments: Bilateral periorbital and orbital edema, R>L (improved)   Cardiovascular:      Rate and Rhythm: Normal rate and regular rhythm.      Heart sounds: Normal heart sounds. No murmur heard.  Pulmonary:      Effort: Respiratory distress present.      Breath sounds: Rales present.   Abdominal:      General: There is distension.      Palpations: Abdomen is soft. There is no mass.      Tenderness: There is no abdominal tenderness.   Musculoskeletal:         General: Swelling present. No  deformity. Normal range of motion.      Cervical back: Normal range of motion and neck supple.      Right lower leg: Edema (1+) present.      Left lower leg: Edema (1+) present.   Skin:     General: Skin is warm.      Coloration: Skin is not jaundiced.      Findings: No bruising.      Comments: AVF on LUE significantly enlarged/bulging with improvement in distal arm edema   Neurological:      Mental Status: He is alert and oriented to person, place, and time.   Psychiatric:         Mood and Affect: Mood normal.         Behavior: Behavior normal.         Thought Content: Thought content normal.       Significant Labs: All pertinent labs within the past 24 hours have been reviewed.    Significant Imaging: I have reviewed all pertinent imaging results/findings within the past 24 hours.

## 2023-03-12 NOTE — PT/OT/SLP EVAL
"Speech Language Pathology Evaluation  Bedside Swallow    Patient Name:  Cosme Lewis   MRN:  [id number]  Admitting Diagnosis: Acute on chronic respiratory failure with hypoxia    Recommendations:                 General Recommendations:   monitor diet tolerance   Diet recommendations:  Soft & Bite Sized Diet - IDDSI Level 6, Thin liquids - IDDSI Level 0   Aspiration Precautions: 1 bite/sip at a time, Eliminate distractions, HOB to 90 degrees, Meds whole 1 at a time, Small bites/sips, Standard aspiration precautions, and Wear oxygen during intake   General Precautions: Standard, dental soft, aspiration  Communication strategies:  none    History:     Past Medical History:   Diagnosis Date    Chronic kidney disease-mineral and bone disorder 10/12/2022    COPD (chronic obstructive pulmonary disease)     Diabetes mellitus type 1     ESRD on dialysis     Hypertension     Hypervolemia 2/22/2023    Hyponatremia 3/4/2023    SOB (shortness of breath) 10/12/2022    Patient with history COPD treated with Ellipta the albuterol, fluticasone-salmeterol tachypneic in the ED saturating 94% and 6 L on nasal cannula, patient does not know how many  he uses it is in nursing home.    -duo nebs Q 4  Given that patient is a poor historian, and in the setting of left lower extremity edema and history of coagulopathy PE cannot be ruled out.  Will workup for possible infec       Past Surgical History:   Procedure Laterality Date    AV FISTULA PLACEMENT         Social History: Patient lives at nursing facility.    Prior Intubation HX:  n/a    Modified Barium Swallow: n/a    Chest X-Rays: 3/12:"Cardiac size is enlarged as before.  There are increased interstitial markings and superimposed parenchymal opacities, mildly decreased in the right lower lung as compared to prior study.  There is likely right pleural fluid however this has decreased as well."    Prior diet: regular/thin    Subjective     Pt with recent episode of emesis with possible " "aspiration per chart review, now on venturi mask. Cleared session with RN. Pt awake/alert requesting food. No nausea reported.     "I'm hungry."    Pain/Comfort:  Pain Rating 1: 0/10  Pain Rating Post-Intervention 1: 0/10    Respiratory Status:  venturi mask, 10 L, 35% O2 concentration     Objective:     Oral Musculature Evaluation  Oral Musculature: WFL  Dentition: edentulous  Secretion Management: adequate  Mucosal Quality: adequate  Mandibular Strength and Mobility: WFL  Oral Labial Strength and Mobility: WFL  Lingual Strength and Mobility: WFL  Velar Elevation: WFL  Buccal Strength and Mobility: WFL  Volitional Cough: present  Volitional Swallow: timely  Voice Prior to PO Intake: clear    Bedside Swallow Eval:   Consistencies Assessed:  Thin liquids ice chip x3, 4 oz from cup rim and straw  Puree whole cup of applesauce  Solids cracker x2      Oral Phase:   WFL    Pharyngeal Phase:   no overt clinical signs/symptoms of aspiration  no overt clinical signs/symptoms of pharyngeal dysphagia    Compensatory Strategies  None    Treatment: Pt demonstrating impulsivity with intake, requiring clinician cue to slow down and take one bite/sip at a time. He tolerated all consistencies without any overt s/sx of airway compromise or increased work of breathing. Recommend dysphagia soft solids (level 6)and thin liquids at this time. SLP will follow up to ensure safety ad tolerance. Education provided re: role of SLP, diet recs, swallow precs, s/s aspiration and POC.  Pt verbalized understanding and agreement.     Assessment:     Cosme Lewis is a 57 y.o. male with an SLP diagnosis of  functional oropharyngeal swallow  .  He presents with impulsivity during intake, taking large consecutive bites and sips. SLP will follow up to ensure safety and tolerance of least restrictive PO diet.     Goals:   Multidisciplinary Problems       SLP Goals          Problem: SLP    Goal Priority Disciplines Outcome   SLP Goal     SLP Ongoing, " Progressing   Description: Speech Language Pathology Goals  Goals expected to be met by 3/26    1. Pt will tolerate dysphagia soft solids (level 6) and thin liquids without any overt s/sx of airway compromise.   2. Pt will independently implement swallow precautions during meal time.                                Plan:     Patient to be seen:  3 x/week   Plan of Care expires:  04/11/23  Plan of Care reviewed with:  patient, mother   SLP Follow-Up:  Yes       Discharge recommendations:  other (see comments)   Barriers to Discharge:  None    Time Tracking:     SLP Treatment Date:   03/12/23  Speech Start Time:  1445  Speech Stop Time:  1501     Speech Total Time (min):  16 min    Billable Minutes: Eval Swallow and Oral Function 8 and Self Care/Home Management Training 8    03/12/2023

## 2023-03-12 NOTE — ASSESSMENT & PLAN NOTE
Patient with Hypoxic Respiratory failure which is Acute on chronic.  he is on home oxygen at 3 LPM. Supplemental oxygen was provided and noted- Oxygen Concentration (%):  [35] 35.   Signs/symptoms of respiratory failure include- tachypnea, increased work of breathing, respiratory distress, use of accessory muscles and increased O2 requirement. Contributing diagnoses includes - ARDS, Aspiration, CHF, Pleural effusion, Pneumonia and Pulmonary Embolus Labs and images were reviewed. Patient Has recent ABG, which has been reviewed. Will treat underlying causes and adjust management of respiratory failure as follows.    CTA negative for PE but with GGO suggesting volume overload with atelectasis and bilateral pleural effusions  BLE DVT US negative    - increased O2 requirement from 3L to 12L on facemask, wean down O2 as tolerated  - nephrology consulted for HD for further volume removal, rec CRRT if necessary for respiratory distress as the HD unit is backed up  - resumed home eliquis 2/2 cf developing PE/DVT  - aspiration precautions  - SLP evaluated, recommending level 6 dysphagia diet

## 2023-03-12 NOTE — NURSING
Morning bedside handoff report complete.  He is awake and asking for food.  I explained the meal tray have not been delivered at this time. He vu. Safety precautions remain in place.

## 2023-03-12 NOTE — RESPIRATORY THERAPY
RAPID RESPONSE RESPIRATORY THERAPY NOTE             Code Status: Full Code   : 1965  Bed: 8067/8067 A:   MRN: [id number]  Time page Received: 0947   Time Rapid Response RT at Bedside: 0949  Time Rapid Response RT left Bedside: 1033    SITUATION    Evaluated patient for: Low SpO2 possible aspiration.    BACKGROUND    Why is the patient in the hospital?: ESRD on dialysis    Patient has a past medical history of Chronic kidney disease-mineral and bone disorder, COPD (chronic obstructive pulmonary disease), Diabetes mellitus type 1, ESRD on dialysis, Hypertension, Hypervolemia, Hyponatremia, and SOB (shortness of breath).    24 Hours Vitals Range:  Temp:  [97.4 °F (36.3 °C)-98.4 °F (36.9 °C)]   Pulse:  [64-80]   Resp:  [15-20]   BP: (156-179)/(77-97)   SpO2:  [87 %-99 %]     Labs:    Recent Labs     03/10/23  1551 23  0313 23  0729    136 133*   K 4.5 5.2* 4.8    98 99   CO2 24 24 24   CREATININE 4.1* 4.6* 4.1*   GLU 81 299* 482*   PHOS  --  4.3 3.4   MG  --  2.2 2.1        Recent Labs     23  0918   PH 7.350   PCO2 53.7*   PO2 235*   HCO3 29.6*   POCSATURATED 100   BE 4       ASSESSMENT/INTERVENTIONS  Pt Aox3 in bed on NRB placed by nursing.  Emesis evident large amount in bed.    Last Vitals: Temp: 98 °F (36.7 °C) (808)  Pulse: 69 (808)  Resp: 18 (808)  BP: 156/80 (808)  SpO2: 99 % ( 1001)  Level of Consciousness: Level of Consciousness (AVPU): alert  Respiratory Effort: Respiratory Effort: Unlabored  Expansion/Accessory Muscle Usage: Expansion/Accessory Muscles/Retractions: no use of accessory muscles, no retractions, expansion symmetric  All Lung Field Breath Sounds: All Lung Fields Breath Sounds: Anterior:, Lateral:, coarse  O2 Device/Concentration: NRB 15LPM  NIPPV: No Surgical airway: No Vent: No  ETCO2 monitored:    Ambu at bedside:      Active Orders   Respiratory Care    Oxygen Continuous     Frequency: Continuous     Number of Occurrences:  Until Specified     Order Questions:      Device type: Low flow      Device: Nasal Cannula (1- 5 Liters)      LPM: 4      Titrate O2 per Oxygen Titration Protocol: Yes      To maintain SpO2 goal of: >= 90%      Notify MD of: Inability to achieve desired SpO2; Sudden change in patient status and requires 20% increase in FiO2; Patient requires >60% FiO2    Pulse Oximetry Q4H     Frequency: Q4H     Number of Occurrences: Until Specified       RECOMMENDATIONS  ?  We recommend: RRT Recs: Pt given CXR and scheduled for further imaging.  NRB replaced with Venti mask.  Suction set up at bedside and instruction for yankuer use given.    ESCALATION        FOLLOW-UP    Disposition: Remain in room 8067.    Please call back the Rapid Response RT, Pito Mejias, RRT at x [phone] for any questions or concerns.

## 2023-03-12 NOTE — PLAN OF CARE
Problem: Adult Inpatient Plan of Care  Goal: Plan of Care Review  Outcome: Ongoing, Progressing  Goal: Patient-Specific Goal (Individualized)  Outcome: Ongoing, Progressing  Goal: Absence of Hospital-Acquired Illness or Injury  Outcome: Ongoing, Progressing  Goal: Optimal Comfort and Wellbeing  Outcome: Ongoing, Progressing  Goal: Readiness for Transition of Care  Outcome: Ongoing, Progressing     Problem: Diabetes Comorbidity  Goal: Blood Glucose Level Within Targeted Range  Outcome: Ongoing, Progressing     Problem: Skin Injury Risk Increased  Goal: Skin Health and Integrity  Outcome: Ongoing, Progressing     Problem: Fall Injury Risk  Goal: Absence of Fall and Fall-Related Injury  Outcome: Ongoing, Progressing     Problem: Device-Related Complication Risk (Hemodialysis)  Goal: Safe, Effective Therapy Delivery  Outcome: Ongoing, Progressing     Problem: Hemodynamic Instability (Hemodialysis)  Goal: Effective Tissue Perfusion  Outcome: Ongoing, Progressing     Problem: Infection (Hemodialysis)  Goal: Absence of Infection Signs and Symptoms  Outcome: Ongoing, Progressing   He had a eventful day.  This am his O2 sats dropped considerably.  RR was called along the atttending team.  Stat labs, sray, US, CTA were all completed along with blood work.  He was placed on a sophy mask at 11 liters of O2.  Overall he tolerated all test well and is now in his room with O2 per nc at 8 Ltr. Per nasal canula.  The attending did round tonight to follow up.  Safety precautions remain In place.

## 2023-03-12 NOTE — ASSESSMENT & PLAN NOTE
Patient presents with 3 days of orbital and periorbital edema right > left with associated blurry vision  No leukocytosis, afebrile, and HDS  Ophthalmology consulted in the ED, normal intraocular pressures  - CT orbits: facial edema including orbits     - appreciate ophthalmology assistance   - not concerned for cellulitis   - retina specialist follow up outpatient  - f/u cultures   - consider broad spectrum abx if imaging, cultures, labs or vitals become concerning for infection    Improving

## 2023-03-12 NOTE — PLAN OF CARE
Problem: Infection (Hemodialysis)  Goal: Absence of Infection Signs and Symptoms  Outcome: Ongoing, Progressing     Problem: Hemodynamic Instability (Hemodialysis)  Goal: Effective Tissue Perfusion  Outcome: Ongoing, Progressing     Problem: Device-Related Complication Risk (Hemodialysis)  Goal: Safe, Effective Therapy Delivery  Outcome: Ongoing, Progressing     Problem: Fall Injury Risk  Goal: Absence of Fall and Fall-Related Injury  Outcome: Ongoing, Progressing     Problem: Skin Injury Risk Increased  Goal: Skin Health and Integrity  Outcome: Ongoing, Progressing     Problem: Diabetes Comorbidity  Goal: Blood Glucose Level Within Targeted Range  Outcome: Ongoing, Progressing

## 2023-03-12 NOTE — PLAN OF CARE
Problem: SLP  Goal: SLP Goal  Description: Speech Language Pathology Goals  Goals expected to be met by 3/26    1. Pt will tolerate dysphagia soft solids (level 6) and thin liquids without any overt s/sx of airway compromise.   2. Pt will independently implement swallow precautions during meal time.       Outcome: Ongoing, Progressing

## 2023-03-13 NOTE — PLAN OF CARE
Problem: Diabetes Comorbidity  Goal: Blood Glucose Level Within Targeted Range  3/13/2023 0503 by Daniella Ortiz RN  Outcome: Ongoing, Progressing  3/13/2023 0502 by Daniella Ortiz RN  Outcome: Ongoing, Progressing     Problem: Fall Injury Risk  Goal: Absence of Fall and Fall-Related Injury  3/13/2023 0503 by Daniella Ortiz RN  Outcome: Ongoing, Progressing  3/13/2023 0502 by Daniella Ortiz RN  Outcome: Ongoing, Progressing     Problem: Device-Related Complication Risk (Hemodialysis)  Goal: Safe, Effective Therapy Delivery  3/13/2023 0503 by Daniella Ortiz RN  Outcome: Ongoing, Progressing  3/13/2023 0502 by Daniella Ortiz RN  Outcome: Ongoing, Progressing     Problem: Hemodynamic Instability (Hemodialysis)  Goal: Effective Tissue Perfusion  3/13/2023 0503 by Daniella Ortiz RN  Outcome: Ongoing, Progressing  3/13/2023 0502 by Daniella Ortiz RN  Outcome: Ongoing, Progressing     Problem: Infection (Hemodialysis)  Goal: Absence of Infection Signs and Symptoms  3/13/2023 0503 by Daniella Ortiz RN  Outcome: Ongoing, Progressing  3/13/2023 0502 by Daniella Ortiz RN  Outcome: Ongoing, Progressing

## 2023-03-13 NOTE — PLAN OF CARE
[facility] - Telemetry Stepdown  Initial Discharge Assessment       Primary Care Provider: Primary Doctor No    Admission Diagnosis: Renal failure [N19]  ESRD (end stage renal disease) [N18.6]  Periorbital edema [R60.0]  Chest pain [R07.9]  Anasarca associated with disorder of kidney [N04.9]    Admission Date: 3/10/2023  Expected Discharge Date: 3/13/2023    Discharge Barriers Identified: None    Payor: MEDICARE / Plan: MEDICARE PART A & B / Product Type: Government /     Extended Emergency Contact Information  Primary Emergency Contact: Kassandra Leon  Mobile Phone: [phone]  Relation: Mother    Discharge Plan A: Return to nursing home  Discharge Plan B: Return to Nursing Home      [facility]  [address]  [facility] [address]  Phone: [phone] Fax: [phone]      Initial Assessment (most recent)       Adult Discharge Assessment - 03/11/23 0955          Discharge Assessment    Assessment Type Discharge Planning Assessment     Confirmed/corrected address, phone number and insurance Yes     Confirmed Demographics Correct on Facesheet     Source of Information family     If unable to respond/provide information was family/caregiver contacted? Yes     Communicated GERA with patient/caregiver Date not available/Unable to determine     People in Home facility resident     Facility Arrived From: [facility]     Do you expect to return to your current living situation? Yes     Do you have help at home or someone to help you manage your care at home? Yes     Who are your caregiver(s) and their phone number(s)? Nursing home staff     Prior to hospitilization cognitive status: Unable to Assess     Current cognitive status: Unable to Assess     Mobility Management WC     Equipment Currently Used at Home other (see comments)   [facility] resident    Readmission within 30 days? Yes     Patient currently being followed by outpatient case management? No     Are you on dialysis? Yes      Discharge Plan A Return to nursing home     Discharge Plan B Return to Nursing Home     Discharge Plan discussed with: Caregiver     Discharge Barriers Identified None

## 2023-03-13 NOTE — PHYSICIAN QUERY
PT Name: Cosme Lewis  MR #: [id number]     Documentation Clarification      CDS: Trish Khalil RN      Contact information:[email or web address]     This form is a permanent document in the medical record.     Query Date: March 13, 2023    By submitting this query, we are merely seeking further clarification of documentation. Please utilize your independent clinical judgment when addressing the question(s) below.    The Medical Record reflects the following:    Supporting Clinical Findings Location in Medical Record   Past Medical Hx:   DM1       3/5 CCM H&P, 3/5 HM PN, 3/5 Nephrology Consult Note, and 3/7  DC Summary   Type 2 diabetes mellitus with hyperglycemia, with long-term current use of insulin   3/7 HM DC Summary   Insulin regular 10 units subQ x 1  (3/4)  Insulin regular continuous IV per protocol  (3/4-3/5)  Insulin Aspart 5 units subQ TID with meals  (3/5, 3/6-3/7)  Insulin detemir 15 units subQ BID  (3/5)  Insulin detemir 7 units subQ BID  (3/6-3/7)            CHANGE how you take these medications:  insulin detemir U-100 100 unit/mL (3 mL) Inpn pen  Commonly known as: Levemir FLEXTOUCH  Inject 20 Units into the skin every morning and 15 units in the evening.    insulin detemir U-100 100 unit/mL (3 mL) Inpn pen  Commonly known as: Levemir FLEXTOUCH  Inject 7 Units into the skin 2 (two) times daily.  What changed: You were already taking a medication with the same name, and this prescription was added. Make sure you understand how and when to take each.        CONTINUE taking these medications:  insulin aspart U-100 100 unit/mL (3 mL) Inpn pen  Commonly known as: NovoLOG  Inject 5 Units into the skin 3 (three) times daily with meals. (Plus sliding scale)     MAR                      3/7 HM DC Summary                                                                              Due to conflicting documentation, please clarify the diagnosis of Diabetes Mellitus associated with above clinical  findings.    [  x ] Diabetes Mellitus Type 1      [   ] Diabetes Mellitus Type 2      [   ] Other (please specify): ____________     [  ] Clinically undetermined

## 2023-03-13 NOTE — ASSESSMENT & PLAN NOTE
Patient with Hypoxic Respiratory failure which is Acute on chronic.  he is on home oxygen at 3 LPM. Supplemental oxygen was provided and noted-  .   Signs/symptoms of respiratory failure include- tachypnea, increased work of breathing, respiratory distress, use of accessory muscles and increased O2 requirement. Contributing diagnoses includes - ARDS, Aspiration, CHF, Pleural effusion, Pneumonia and Pulmonary Embolus Labs and images were reviewed. Patient Has recent ABG, which has been reviewed. Will treat underlying causes and adjust management of respiratory failure as follows.    CTA negative for PE but with GGO suggesting volume overload with atelectasis and bilateral pleural effusions  BLE DVT US negative  Concern for aspiration pneumonia vs volume overload/flash pulm edema.    - increased O2 requirement from 3L to 12L on facemask, wean down O2 as tolerated  - nephrology consulted for HD for further volume removal, rec CRRT if necessary for respiratory distress as the HD unit is backed up  - resumed home eliquis 2/2 cf developing PE/DVT  - aspiration precautions  - SLP evaluated, recommending level 6 dysphagia diet

## 2023-03-13 NOTE — NURSING
He has had a restful day.  His blood glucose has been under better control.  He has not been snacking between meals today.  He remains anuric.  He is on 6L O2 per nasal canula to maintain sats > 90%.  No sign of distress noted at this time. Safety precautions remain in place.

## 2023-03-13 NOTE — PLAN OF CARE
Problem: Adult Inpatient Plan of Care  Goal: Plan of Care Review  Outcome: Ongoing, Progressing  Goal: Patient-Specific Goal (Individualized)  Outcome: Ongoing, Progressing  Goal: Absence of Hospital-Acquired Illness or Injury  Outcome: Ongoing, Progressing  Goal: Optimal Comfort and Wellbeing  Outcome: Ongoing, Progressing  Goal: Readiness for Transition of Care  Outcome: Ongoing, Progressing     Problem: Diabetes Comorbidity  Goal: Blood Glucose Level Within Targeted Range  Outcome: Ongoing, Progressing     Problem: Skin Injury Risk Increased  Goal: Skin Health and Integrity  Outcome: Ongoing, Progressing     Problem: Fall Injury Risk  Goal: Absence of Fall and Fall-Related Injury  Outcome: Ongoing, Progressing     Problem: Device-Related Complication Risk (Hemodialysis)  Goal: Safe, Effective Therapy Delivery  Outcome: Ongoing, Progressing     Problem: Hemodynamic Instability (Hemodialysis)  Goal: Effective Tissue Perfusion  Outcome: Ongoing, Progressing     Problem: Infection (Hemodialysis)  Goal: Absence of Infection Signs and Symptoms  Outcome: Ongoing, Progressing

## 2023-03-13 NOTE — PROGRESS NOTES
[facility] - Telemetry [facility] Medicine  Progress Note    Patient Name: Cosme Lewis  MRN: [id number]  Patient Class: IP- Inpatient   Admission Date: 3/10/2023  Length of Stay: 1 days  Attending Physician: Robbie Smith MD  Primary Care Provider: Primary Doctor No        Subjective:     Principal Problem:Acute on chronic respiratory failure with hypoxia        HPI:  Cosme Lewis is a 57 y.o. with CKD on HD MWF, DM1, Renovascular HTN, Chronic respiratory failure (on 3L of home O2), HFpEF, and Mild malnutrition who presents to the ED for vomiting and eye swelling. He began feeling nauseated with SOB, LE swelling, abdominal swelling, and facial swelling 3 days ago. He reports blurry vision associated with his eye swelling and discomfort. He has also had dizziness since the onset of symptoms. He reports vomiting twice today but denies blood in his vomit. Denies fever, chills, diarrhea, cough, dysuria, and chest pain. He still produces urine and his last complete session of HD was on 3/8 as he missed HD today due to feeling ill.       Of note, patient recently hospitalized in the MICU here and discharged on 3/7 to [facility] for hypertensive emergency and acute hypoxic respiratory failure.    In the ED, patient arrived afebrile and HDS satting well on RA. Labs remarkable for anemia 11 at baseline, elevated alk phos 261, normal lactate, and normal lipase. CT of the orbits demonstrated diffuse facial edema. CT A/P demonstrated pleural effusions, pulmonary edema, anasarca, and ascites.     Patient admitted to hospital medicine.       Overview/Hospital Course:  Patient admitted for volume overload 2/2 missed HD session and orbital swelling. Ophthalmology consulted, is not concerned for cellulitis at this time as imaging and clinical picture more consistent with edema. Nephrology consulted and assisting with HD coordination. On 3/11, patient noted to have oozing from AVF on LUE with distal arm edema. Vascular  surgery consulted, recommended holding eliquis and getting US of AVF with further evaluation to be done on 3/13. HD was performed on 3/11 with 3.5L removed. Patient developed acute hypoxic respiratory failure on 3/12 with an increased O2 requirement to 12L. CTA negative for PE but with GGO suggestive of volume overload. Pt noted to have vomitus w c/f possible aspiration PNA vs pneumonitis and was initiated on ceftriaxine/doxycycline. Glucose control has been a challenge given non-compliance to diet.       Interval History: On 6L with SpO2 93% this AM, HR 60s, -160 systolic. Some bleeding noted on guaze on AVF, re-wrapped without further bleeding. Not voicing any complaints.    Review of Systems   Constitutional:  Negative for chills and fever.   HENT:  Negative for rhinorrhea and sore throat.    Eyes:  Positive for visual disturbance.   Respiratory:  Negative for cough and shortness of breath.    Cardiovascular:  Negative for chest pain.   Gastrointestinal:  Positive for nausea and vomiting. Negative for abdominal pain, blood in stool and diarrhea.   Genitourinary:  Negative for dysuria and hematuria.   Neurological:  Positive for dizziness. Negative for light-headedness.   Psychiatric/Behavioral:  Negative for confusion and decreased concentration.    Objective:     Vital Signs (Most Recent):  Temp: 97.7 °F (36.5 °C) (03/13/23 1408)  Pulse: (!) 59 (03/13/23 1456)  Resp: 18 (03/13/23 1408)  BP: 133/64 (03/13/23 1408)  SpO2: (!) 94 % (03/13/23 1408)   Vital Signs (24h Range):  Temp:  [97.7 °F (36.5 °C)-98.2 °F (36.8 °C)] 97.7 °F (36.5 °C)  Pulse:  [59-76] 59  Resp:  [18-20] 18  SpO2:  [90 %-97 %] 94 %  BP: (132-161)/(64-81) 133/64     Weight: 70.4 kg (155 lb 3.3 oz)  Body mass index is 22.92 kg/m².    Intake/Output Summary (Last 24 hours) at 3/13/2023 1516  Last data filed at 3/13/2023 1306  Gross per 24 hour   Intake 700 ml   Output 0 ml   Net 700 ml      Physical Exam  Vitals and nursing note reviewed.    Constitutional:       General: He is not in acute distress.     Appearance: Normal appearance. He is ill-appearing. He is not diaphoretic.   HENT:      Head: Normocephalic and atraumatic.      Right Ear: External ear normal.      Left Ear: External ear normal.      Nose:      Comments:   NC in place     Mouth/Throat:      Mouth: Mucous membranes are moist.   Eyes:      General: No scleral icterus.        Right eye: No discharge.         Left eye: No discharge.      Comments: Bilateral periorbital and orbital edema, R>L (improved)   Cardiovascular:      Rate and Rhythm: Normal rate and regular rhythm.      Heart sounds: Normal heart sounds. No murmur heard.  Pulmonary:      Effort: No respiratory distress.      Breath sounds: No rales.   Abdominal:      General: There is distension.      Palpations: Abdomen is soft. There is no mass.      Tenderness: There is no abdominal tenderness.   Musculoskeletal:         General: Swelling present. No deformity. Normal range of motion.      Cervical back: Normal range of motion and neck supple.      Right lower leg: Edema (1+) present.      Left lower leg: Edema (1+) present.   Skin:     General: Skin is warm.      Coloration: Skin is not jaundiced.      Findings: No bruising.      Comments: AVF on LUE significantly enlarged/bulging with improvement in distal arm edema   Neurological:      Mental Status: He is alert and oriented to person, place, and time.   Psychiatric:         Mood and Affect: Mood normal.         Behavior: Behavior normal.         Thought Content: Thought content normal.       Significant Labs: All pertinent labs within the past 24 hours have been reviewed.    Significant Imaging: I have reviewed all pertinent imaging results/findings within the past 24 hours.      Assessment/Plan:      * Acute on chronic respiratory failure with hypoxia  Patient with Hypoxic Respiratory failure which is Acute on chronic.  he is on home oxygen at 3 LPM. Supplemental oxygen  was provided and noted-  .   Signs/symptoms of respiratory failure include- tachypnea, increased work of breathing, respiratory distress, use of accessory muscles and increased O2 requirement. Contributing diagnoses includes - ARDS, Aspiration, CHF, Pleural effusion, Pneumonia and Pulmonary Embolus Labs and images were reviewed. Patient Has recent ABG, which has been reviewed. Will treat underlying causes and adjust management of respiratory failure as follows.    CTA negative for PE but with GGO suggesting volume overload with atelectasis and bilateral pleural effusions  BLE DVT US negative  Concern for aspiration pneumonia vs volume overload/flash pulm edema.    - increased O2 requirement from 3L to 12L on facemask, wean down O2 as tolerated  - nephrology consulted for HD for further volume removal, rec CRRT if necessary for respiratory distress as the HD unit is backed up  - resumed home eliquis 2/2 cf developing PE/DVT  - aspiration precautions  - SLP evaluated, recommending level 6 dysphagia diet    AVF (arteriovenous fistula)  AVF to LUE oozing blood from needle sites, significantly enlarged/buldging, and with distal arm edema    - vascular surgery consulted, appreciate assistance   - f/u AVF US   - perform HD using AVF    Orbital edema  Patient presents with 3 days of orbital and periorbital edema right > left with associated blurry vision  No leukocytosis, afebrile, and HDS  Ophthalmology consulted in the ED, normal intraocular pressures  - CT orbits: facial edema including orbits     - appreciate ophthalmology assistance   - not concerned for cellulitis   - retina specialist follow up outpatient  - f/u cultures   - consider broad spectrum abx if imaging, cultures, labs or vitals become concerning for infection    Improving    Type 2 diabetes mellitus with hyperglycemia, with long-term current use of insulin    Lab Results   Component Value Date    HGBA1C 9.5 (H) 03/06/2023     Recent Labs   Lab  03/12/23  1708 03/12/23  2009 03/13/23  0725 03/13/23  1147   POCTGLUCOSE 161* 141* 303* 219*     Current correctional scale  Medium  Maintain anti-hyperglycemic dose as follows-   Antihyperglycemics (From admission, onward)    Start     Stop Route Frequency Ordered    03/13/23 1645  insulin aspart U-100 pen 6 Units         -- SubQ 3 times daily with meals 03/13/23 1515    03/13/23 0900  insulin detemir U-100 pen 8 Units         -- SubQ 2 times daily 03/13/23 0742    03/12/23 2030  insulin aspart U-100 pen 0-5 Units         -- SubQ Before meals & nightly PRN 03/12/23 1931        Hold Oral hypoglycemics while patient is in the hospital.  POCT ACQHS    HLD (hyperlipidemia)  Continue home statin      Renovascular hypertension  Blood pressure 140/64 on arrival  Home regiment: clonidine, hydralazine, nifedipine, IMDUR, and coreg    - resume home regiment      Anemia in ESRD (end-stage renal disease)  Hb 11.1 on arrival near baseline.  No active signs or patient reports of bleeding.    - monitor CBC daily  - transfuse as needed for Hb < 7      Multiple subsegmental pulmonary emboli without acute cor pulmonale  Hx of pulmonary thromboembolism within a lobar branch to the right lower lobe noting additional pulmonary thromboembolism within segmental branches to the right upper lobe in 10/2022    CTA on 3/12/23 negative for acute or chronic PE  BLE DVT US negative    - resume home eliquis- HELD 3/13 due to bleeding from fistula, resume if no bleeding tmrw    Chronic diastolic heart failure  Results for orders placed during the hospital encounter of 11/18/22  Echo  · The left ventricle is normal in size with mild concentric hypertrophy and increased density and normal systolic function.  · The estimated ejection fraction is 63%.  · Grade II left ventricular diastolic dysfunction.  · Severe left atrial enlargement.  · Mild right ventricular enlargement with low normal right ventricular systolic function.  · There is right  ventricular hypertrophy.  · Moderate right atrial enlargement.  · Mild to moderate tricuspid regurgitation.  · Mild pulmonic regurgitation.  · Elevated central venous pressure (15 mmHg).  · The estimated PA systolic pressure is 67 mmHg.  · There is moderate-severe pulmonary hypertension.  · Trivial posterolateral pericardial effusion.  · There is a moderate left pleural effusion.  · There may be some ascites present.    Patient home medications include carvedilol and IMDUR  - continue home medications  - nephrology consulted for HD  - continue home O2  - strict I&O  - fluid restrict to 1.5 liter on cardiac diet    ESRD (end stage renal disease)  HD MWF with AVF to [facility]  Patient last HD session on 3/8, missed session on 3/10 due to feeling ill  Still produces urine  Electrolytes stable and no urgent need for HD at this time although is volume overloaded and will need HD soon    - nephrology consulted for inpatient HD coordination, appreciate assistance  - renally dose all medications  - avoid nephrotoxic agents  - strict I&O  - renal diet fluid restricted to 1.5L  - monitor renal function daily      VTE Risk Mitigation (From admission, onward)         Ordered     IP VTE HIGH RISK PATIENT  Once         03/10/23 1815     Place sequential compression device  Until discontinued         03/10/23 1812                Discharge Planning   GERA: 3/15/2023     Code Status: Full Code   Is the patient medically ready for discharge?: No    Reason for patient still in hospital (select all that apply): Treatment  Discharge Plan A: Return to nursing home                  Nuvia Gould DO  Department of Hospital Medicine   [facility] - Telemetry Stepdown

## 2023-03-13 NOTE — ASSESSMENT & PLAN NOTE
Hx of pulmonary thromboembolism within a lobar branch to the right lower lobe noting additional pulmonary thromboembolism within segmental branches to the right upper lobe in 10/2022    CTA on 3/12/23 negative for acute or chronic PE  BLE DVT US negative    - resume home eliquis- HELD 3/13 due to bleeding from fistula, resume if no bleeding tmrw

## 2023-03-13 NOTE — PLAN OF CARE
Problem: Occupational Therapy  Goal: Occupational Therapy Goal  Description: Goals to be met by: 4/3/23     Patient will increase functional independence with ADLs by performing:    UE Dressing with Stand-by Assistance.  LE Dressing with Minimal Assistance.  Grooming while standing with Minimal Assistance.  Toileting from bedside commode with Minimal Assistance for hygiene and clothing management.   Toilet transfer to bedside commode with Minimal Assistance.  Upper extremity exercise program x10 reps per handout, with independence.    Outcome: Ongoing, Progressing

## 2023-03-13 NOTE — SUBJECTIVE & OBJECTIVE
Interval History: On 6L with SpO2 93% this AM, HR 60s, -160 systolic. Some bleeding noted on guaze on AVF, re-wrapped without further bleeding. Not voicing any complaints.    Review of Systems   Constitutional:  Negative for chills and fever.   HENT:  Negative for rhinorrhea and sore throat.    Eyes:  Positive for visual disturbance.   Respiratory:  Negative for cough and shortness of breath.    Cardiovascular:  Negative for chest pain.   Gastrointestinal:  Positive for nausea and vomiting. Negative for abdominal pain, blood in stool and diarrhea.   Genitourinary:  Negative for dysuria and hematuria.   Neurological:  Positive for dizziness. Negative for light-headedness.   Psychiatric/Behavioral:  Negative for confusion and decreased concentration.    Objective:     Vital Signs (Most Recent):  Temp: 97.7 °F (36.5 °C) (03/13/23 1408)  Pulse: (!) 59 (03/13/23 1456)  Resp: 18 (03/13/23 1408)  BP: 133/64 (03/13/23 1408)  SpO2: (!) 94 % (03/13/23 1408)   Vital Signs (24h Range):  Temp:  [97.7 °F (36.5 °C)-98.2 °F (36.8 °C)] 97.7 °F (36.5 °C)  Pulse:  [59-76] 59  Resp:  [18-20] 18  SpO2:  [90 %-97 %] 94 %  BP: (132-161)/(64-81) 133/64     Weight: 70.4 kg (155 lb 3.3 oz)  Body mass index is 22.92 kg/m².    Intake/Output Summary (Last 24 hours) at 3/13/2023 1516  Last data filed at 3/13/2023 1306  Gross per 24 hour   Intake 700 ml   Output 0 ml   Net 700 ml      Physical Exam  Vitals and nursing note reviewed.   Constitutional:       General: He is not in acute distress.     Appearance: Normal appearance. He is ill-appearing. He is not diaphoretic.   HENT:      Head: Normocephalic and atraumatic.      Right Ear: External ear normal.      Left Ear: External ear normal.      Nose:      Comments:   NC in place     Mouth/Throat:      Mouth: Mucous membranes are moist.   Eyes:      General: No scleral icterus.        Right eye: No discharge.         Left eye: No discharge.      Comments: Bilateral periorbital and orbital  edema, R>L (improved)   Cardiovascular:      Rate and Rhythm: Normal rate and regular rhythm.      Heart sounds: Normal heart sounds. No murmur heard.  Pulmonary:      Effort: No respiratory distress.      Breath sounds: No rales.   Abdominal:      General: There is distension.      Palpations: Abdomen is soft. There is no mass.      Tenderness: There is no abdominal tenderness.   Musculoskeletal:         General: Swelling present. No deformity. Normal range of motion.      Cervical back: Normal range of motion and neck supple.      Right lower leg: Edema (1+) present.      Left lower leg: Edema (1+) present.   Skin:     General: Skin is warm.      Coloration: Skin is not jaundiced.      Findings: No bruising.      Comments: AVF on LUE significantly enlarged/bulging with improvement in distal arm edema   Neurological:      Mental Status: He is alert and oriented to person, place, and time.   Psychiatric:         Mood and Affect: Mood normal.         Behavior: Behavior normal.         Thought Content: Thought content normal.       Significant Labs: All pertinent labs within the past 24 hours have been reviewed.    Significant Imaging: I have reviewed all pertinent imaging results/findings within the past 24 hours.

## 2023-03-13 NOTE — NURSING
Bedside handoff report complete. Patient is AO x 3 with no apparent distress at this time. Routine assessment performed. Skin integrity maintained and intact. He is asking for food . Educated patient about dietary restrictions as per orders. White board updated. Safety precautions remain in place. WCTM.

## 2023-03-13 NOTE — PT/OT/SLP PROGRESS
Speech Language Pathology      Cosme Lewis  MRN: [id number]    Patient not seen today secondary to (pt off the floor upon SLP attempt.) Writing SLP unable to return for second attempt this date. Will follow-up as able and appropriate.   3/13/2023

## 2023-03-13 NOTE — TELEPHONE ENCOUNTER
----- Message from Kemal Hedrick MD sent at 3/10/2023  8:07 PM CST -----  Regarding: ED f/u  Hello,    This patient was seen in the ED in regards to eyelid edema and poor controlled diabetes and needs follow up in retina clinic in 1-3 months.  Please contact them to arrange.    Thank you,    Kemal Hedrick  [facility]

## 2023-03-13 NOTE — NURSING
Bedside handoff report complete.  He is awake on rounds and asking for food.  I explained he is on a diabetic diet and trays will be served at or about 0800.  White board is updated. Safety precautions in place.

## 2023-03-13 NOTE — ASSESSMENT & PLAN NOTE
Lab Results   Component Value Date    HGBA1C 9.5 (H) 03/06/2023     Recent Labs   Lab 03/12/23  1708 03/12/23  2009 03/13/23  0725 03/13/23  1147   POCTGLUCOSE 161* 141* 303* 219*     Current correctional scale  Medium  Maintain anti-hyperglycemic dose as follows-   Antihyperglycemics (From admission, onward)    Start     Stop Route Frequency Ordered    03/13/23 1645  insulin aspart U-100 pen 6 Units         -- SubQ 3 times daily with meals 03/13/23 1515    03/13/23 0900  insulin detemir U-100 pen 8 Units         -- SubQ 2 times daily 03/13/23 0742    03/12/23 2030  insulin aspart U-100 pen 0-5 Units         -- SubQ Before meals & nightly PRN 03/12/23 1931        Hold Oral hypoglycemics while patient is in the hospital.  [facility]

## 2023-03-13 NOTE — PHYSICIAN QUERY
PT Name: Cosme Lewis  MR #: [id number]     DOCUMENTATION CLARIFICATION     CDS/: Trish Khalil               Contact information:  This form is a permanent document in the medical record.     Query Date: March 13, 2023    By submitting this query, we are merely seeking further clarification of documentation.  Please utilize your independent clinical judgment when addressing the question(s) below.    The Medical Record contains the following   Indicators Supporting Clinical Findings Location in Medical Record   x Heart Failure documented Chronic diastolic heart failure        Hypervolemia  Due to ESRD and acute diastolic HF, hypertensive emergency     3/5 [facility] H&P, 3/5 HM PN - 3/7 HM DC Summary      3/5  PN           x BNP  03/04/23 15:45   BNP 4,520 (H)      Lab Values   x EF/Echo Most Recent Echo 11/22/22:  The left ventricle is normal in size with mild concentric hypertrophy and increased density and normal systolic function.  The estimated ejection fraction is 63%.  Grade II left ventricular diastolic dysfunction.  Severe left atrial enlargement.  Mild right ventricular enlargement with low normal right ventricular systolic function.  There is right ventricular hypertrophy.  Moderate right atrial enlargement.  Mild to moderate tricuspid regurgitation.  Mild pulmonic regurgitation.  Elevated central venous pressure (15 mmHg).  The estimated PA systolic pressure is 67 mmHg.  There is moderate-severe pulmonary hypertension.  Trivial posterolateral pericardial effusion.  There is a moderate left pleural effusion.  There may be some ascites present.      3/5 [facility] H&P   x Radiology findings Unchanged bilateral ground-glass airspace, suggestive of diffuse pulmonary edema in the setting of CHF.  Slight increase in right-sided pleural effusion.      Diffuse body wall edema 3/4/23 X-Ray Chest AP Portable          3/4/23 X-Ray Abdomen AP 1 View        x Subjective/Objective Respiratory Conditions Acute on chronic  respiratory failure with hypoxia          Contributing diagnoses includes - Fluid overload      3/5 [facility] H&P    Recent/Current MI      Heart Transplant, LVAD      Edema, JVD      Ascites     x Diuretics/Meds Furosemide 120mg IV x 1  (3/4)     MAR   x Other Treatment Continue BiPAP for fluid overload and hypercapnia  Nephrology consulted for urgent HD volume removal  Resuming home meds  Trend troponin  Will monitor volume status and diurese further as warranted      Hemodialysis 3/4, 3/5, 3/6   3/5 [facility] H&P              Physician Orders      Other       Heart failure is a clinical diagnosis which includes symptomatic fluid retention, elevated intracardiac pressures, and/or the inability of the heart to deliver adequate blood flow.    Heart Failure with reduced Ejection Fraction (HFrEF) or Systolic Heart Failure (loses ability to contract normally, EF is <40%)    Heart Failure with preserved Ejection Fraction (HFpEF) or Diastolic Heart Failure (stiff ventricles, does not relax properly, EF is >50%)     Heart Failure with Combined Systolic and Diastolic Failure (stiff ventricles, does not relax properly and EF is <50%)    Mid-range or mildly reduced ejection fraction (HFmrEF) is classified as systolic heart failure.  Congestive heart failure with a recovered EF is classified as Diastolic Heart Failure.  Common clues to acute exacerbation:  Rapidly progressive symptoms (w/in 2 weeks of presentation), using IV diuretics, using supplemental O2, pulmonary edema on Xray, new or worsening pleural effusion, +JVD or other signs of volume overload, MI w/in 4 weeks, and/or BNP >500  The clinical guidelines noted are only system guidelines, and do not replace the providers clinical judgment.      Due to conflicting documentation, please clarify the Acuity of Diastolic Heart Failure associated with the above clinical findings.    [  x ]  Acute on Chronic Diastolic Heart Failure (HFpEF) - worsening of CHF signs/symptoms in  preexisting CHF     [   ]  Chronic Diastolic Heart Failure (HFpEF) - preexisting and stable     [   ]  Other (please specify): ___________________________________     [  ]  Clinically Undetermined       Please document in your progress notes daily for the duration of treatment until resolved and include in your discharge summary.    References:  American Heart Association editorial staff. (2017, May). Ejection Fraction Heart Failure Measurement. American Heart Association. https://www.heart.org/en/health-topics/heart-failure/diagnosing-heart-failure/ejection-fraction-heart-failure-measurement#:~:text=Ejection%20fraction%20(EF)%20is%20a,pushed%20out%20with%20each%20heartbeat  ALBERTO Mendoza (2020, December 15). Heart failure with preserved ejection fraction: Clinical manifestations and diagnosis. [employer]. https://www.[employer]/contents/heart-failure-with-preserved-ejection-fraction-clinical-manifestations-and-diagnosis.  ICD-10-CM/PCS Coding Clinic Third Quarter ICD-10, Effective with discharges: September 8, 2020 [facility] § Heart failure with mid-range or mildly reduced ejection fraction (2020).  ICD-10-CM/PCS Coding Clinic Third Quarter ICD-10, Effective with discharges: September 8, 2020 [facility] § Heart failure with recovered ejection fraction (2020).  Form No. 44561

## 2023-03-13 NOTE — PHYSICIAN QUERY
PT Name: Cosme Lewis  MR #: [id number]     DOCUMENTATION CLARIFICATION     CDS: Trish Khalil RN      Contact information:[email or web address]   This form is a permanent document in the medical record.     Query Date: March 13, 2023    By submitting this query, we are merely seeking further clarification of documentation to reflect the severity of illness of your patient. Please utilize your independent clinical judgment when addressing the question(s) below.    The Medical Record contains the following:   Indicators   Supporting Clinical Findings Location in Medical Record   x Hypertension or hypertensive documented MICU was consulted for admission given his BiPAP need and hypertensive urgency/emergency          Hypertensive emergency      Hypertensive emergency with end organ damage evidenced by acute pulmonary edema without heart failure which is uncontrolled        HTN urgency 3/5 [facility] H&P          3/5 HM PN          3/6 Attestation to [facility] PN by Dr. Troy villalba Acute/Chronic condition(s) Acute on chronic respiratory failure with hypoxia  Chronic diastolic heart failure  Hyponatremia  Multiple subsegmental pulmonary emboli without acute cor pulmonale  Anemia in ESRD   Hyperglycemia  Elevated liver enzymes    PMHx: CKD on HD MWF, DM1, Renovascular HTN, Chronic respiratory failure, HFpEF, and Mild malnutrition     3/5 [facility] H&P   x Vital signs BP  200/100,  226/112,  232/112,  230/109,  209/102,   212/100,  198/103,  181/100      HR  80,  78,  76,  65,  62,  65,  66,  71,  72  RR  22,  22,  28,  28,  25,   19,  26,  22,  22   3/4-3/6 VS Flowsheet   x Lab findings  03/04/23 15:45   BNP 4,520 (H)        03/04/23 15:45 03/05/23 08:00 03/05/23 12:06   Troponin I 0.028 (H) 0.035 (H) 0.037 (H)    Lab Values   x Radiology findings Unchanged bilateral ground-glass airspace, suggestive of diffuse pulmonary edema in the setting of CHF.  Slight increase in right-sided pleural effusion.     3/4/23 X-Ray Chest AP  Portable   x Treatment/Medication Nicardipine 40mg/200mL IV gtt, titrate to maintain SBP < 180 mmHg  (3/4-3/5)    Furosemide 120mg IV x 1  (3/4)    Carvedilol 6.25mg PO BID  (3/4-3/7)  Hydralazine 20mg IV x 1  (3/4)  Hydralazine 100mg PO q 8hrs  (3/4-3/7)  Isosorbide mononitrate 30mg PO BID  (3/4-3/7)  Labetolol 20mg IV x 1  (3/4)  Clonidine 0.3mg TD patch q Sat  (3/5-3/7)  Nifedipine 24hr tab, 90mg PO daily  (3/5-3/7)      Nephrology consulted for urgent HD      Hemodialysis 3/4, 3/5, 3/6   MAR                          3/5 [facility] H&P      Physician Orders        Other       The clinical guidelines noted are only a system guideline. It does not replace the provider's clinical judgment.    Due to conflicting documentation, please clarify the Hypertensive Condition that correspond(s) to the above indicators:    [  x ] Hypertensive emergency        - Severe hypertension (SBP>180 and/or DBP>120mmHg) with signs or symptoms of new or worsening end-organ damage (i.e. hypertensive encephalopathy, retinal hemorrhage, papilledema, acute kidney injury, stroke, heart attack, heart failure, kidney failure)    Had pulmonary edema and CHF, resp failure   [   ] Hypertensive urgency       - Severe asymptomatic hypertension (SBP>180 and/or DBP>120mmHg) without signs or symptoms of acute end-organ damage. Can have a mild headache.     [   ] Other cardiovascular condition (please specify): __________     [   ]  Clinically Undetermined       Please document in your progress notes daily for the duration of treatment until resolved, and include in your discharge summary.    References:    ABDULKADIR Arreguin MD, PhD, & NINA Paul MD, FACP, FCCP, FCCM, FRSM. (2020, June 25). Evaluation and treatment of hypertensive emergencies in adults (KYLE Emanuel MD, ABDULKADIR Aldrich MD, & NINA Chakraborty MD, MSc, Eds.). Retrieved October 22, 2020, from  https://www.[employer]/contents/evaluation-and-treatment-sq-burdtrvtrcux-nobmoujrfma-in-adults?search=hypertensive%20emergency&source=search_result&selectedTitle=1~150&usage_type=default&display_rank=1    NINA Paul MD, FACP, FCCP, FCCM, FRSM, & ABDULKADIR Arreguin MD, PhD. (2020, October 14). Management of severe asymptomatic hypertension (hypertensive urgencies) in adults (KYLE Emanuel MD, ABDULKADIR Aldrich MD, & NINA Chakraborty MD, MSc, Eds.). Retrieved October 22, 2020, from https://www.[employer]/contents/management-of-severe-asymptomatic-hypertension-hypertensive-urgencies-in-adults?search=hypertensive%20urgency&source=search_result&selectedTitle=1~41&usage_type=default&display_rank=1    Form No. [id number]

## 2023-03-13 NOTE — PT/OT/SLP EVAL
"Occupational Therapy   Evaluation    Name: Cosme Lewis  MRN: [id number]  Admitting Diagnosis: Acute on chronic respiratory failure with hypoxia  Recent Surgery: * No surgery found *      Recommendations:     Discharge Recommendations: other (see comments)  Discharge Equipment Recommendations:  walker, rolling, wheelchair  Barriers to discharge:       Assessment:     Cosme Lewis is a 57 y.o. male with a medical diagnosis of Acute on chronic respiratory failure with hypoxia.  He presents with fair tolerance to session on this date. Pt completing bed mob requiring Mod A. Pt w SOB and agitation throughout session in addition to decreased EOB tolerance. Pt w decreased ability to perform func amb on this date despite max encouragement and education on importance of OOB mobility. Pt however did express interest in HEP and supine exercises. Pt stating L UE was in pain(2/2 dialysis port) w UE wrapped however did not quantify pain. Performance deficits affecting function: weakness, impaired endurance, impaired self care skills, impaired functional mobility, gait instability, decreased coordination, decreased upper extremity function, decreased lower extremity function, decreased safety awareness, impaired cardiopulmonary response to activity.    Pt motivated to return home however not at baseline for ADL and functional mobility performance in addition to concerns of fall risk and would benefit from continued OT services at this time.    Rehab Prognosis: Good; patient would benefit from acute skilled OT services to address these deficits and reach maximum level of function.       Plan:     Patient to be seen 3 x/week to address the above listed problems via self-care/home management, therapeutic exercises, therapeutic activities  Plan of Care Expires: 04/13/23  Plan of Care Reviewed with: patient    Subjective     Chief Complaint: "I cant walk"  Patient/Family Comments/goals: "I haven't walked in 3 years" per pt however per chart " "pt completed func amb on 3/7/23 requiring Min x 2 w RW    Occupational Profile:  Living Environment: Pt resides in NH  Previous level of function: Max A w ADLs w pt stating "I cant do anything"  Equipment Used at Home:    Assistance upon Discharge: NH staff and family assistance     Pain/Comfort:  Pain Rating 1:  (did not rate)  Pain Addressed 2: Reposition, Distraction, Cessation of Activity    Patients cultural, spiritual, Gnosticism conflicts given the current situation: no    Objective:     Communicated with: RN prior to session.  Patient found supine with pulse ox (continuous), telemetry, Condom Catheter, oxygen upon OT entry to room.    General Precautions: Standard, aspiration, fall  Orthopedic Precautions: N/A  Braces: N/A  Respiratory Status: Nasal cannula, flow 2 L/min    Occupational Performance:    Bed Mobility:    Patient completed Rolling/Turning to Left with  minimum assistance  Patient completed Rolling/Turning to Right with minimum assistance  Patient completed Scooting/Bridging with minimum assistance  Patient completed Supine to Sit with moderate assistance  Patient completed Sit to Supine with minimum assistance  Pt requiring Mod A at trunk for sup>sit  Sit>sup requiring Min A w pt impulsively lying down despite safety precautions and commands    Functional Mobility/Transfers:  Functional Mobility: pt deferred on this date 2/2 dizziness and continuously stating he was unable    Activities of Daily Living:  Lower Body Dressing: maximal assistance donned socks while pt seated EOB . Max A required to don/doff brief using log roll technique    Cognitive/Visual Perceptual:  Cognitive/Psychosocial Skills:     -       Oriented to: Person, Place, and Situation   -       Follows Commands/attention:Follows multistep  commands  -       Communication: clear/fluent  -       Memory: impaired stating he hadn't walked in 3 year  -       Safety awareness/insight to disability: impaired   -       " Mood/Affect/Coping skills/emotional control: Anxious and Agitated    Physical Exam:  Upper Extremity Range of Motion:     -       Right Upper Extremity: WFL  -       Left Upper Extremity: WFL  Upper Extremity Strength:    -       Right Upper Extremity: WFL  -       Left Upper Extremity: WFL   Strength:    -       Right Upper Extremity: WFL  -       Left Upper Extremity: WFL    AMPAC 6 Click ADL:  AMPAC Total Score: 15    Treatment & Education:  Pt educated on scope of practice and importance of daily functional mobility.   Pt educated on safety precautions during transfers  Pt updated on POC.      Patient left supine with all lines intact, call button in reach, RN notified, and PCT present    GOALS:   Multidisciplinary Problems       Occupational Therapy Goals          Problem: Occupational Therapy    Goal Priority Disciplines Outcome Interventions   Occupational Therapy Goal     OT, PT/OT Ongoing, Progressing    Description: Goals to be met by: 4/3/23     Patient will increase functional independence with ADLs by performing:    UE Dressing with Stand-by Assistance.  LE Dressing with Minimal Assistance.  Grooming while standing with Minimal Assistance.  Toileting from bedside commode with Minimal Assistance for hygiene and clothing management.   Toilet transfer to bedside commode with Minimal Assistance.  Upper extremity exercise program x10 reps per handout, with independence.                         History:     Past Medical History:   Diagnosis Date    Chronic kidney disease-mineral and bone disorder 10/12/2022    COPD (chronic obstructive pulmonary disease)     Diabetes mellitus type 1     ESRD on dialysis     Hypertension     Hypervolemia 2/22/2023    Hyponatremia 3/4/2023    SOB (shortness of breath) 10/12/2022    Patient with history COPD treated with Ellipta the albuterol, fluticasone-salmeterol tachypneic in the ED saturating 94% and 6 L on nasal cannula, patient does not know how many  he uses it is in  nursing home.    -duo nebs Q 4  Given that patient is a poor historian, and in the setting of left lower extremity edema and history of coagulopathy PE cannot be ruled out.  Will workup for possible infec         Past Surgical History:   Procedure Laterality Date    AV FISTULA PLACEMENT         Time Tracking:     OT Date of Treatment: 03/13/23  OT Start Time: 1518  OT Stop Time: 1545  OT Total Time (min): 27 min    Billable Minutes:Evaluation 10  Therapeutic Activity 17    3/13/2023

## 2023-03-13 NOTE — PLAN OF CARE
Problem: Diabetes Comorbidity  Goal: Blood Glucose Level Within Targeted Range  Outcome: Ongoing, Progressing     Problem: Skin Injury Risk Increased  Goal: Skin Health and Integrity  Outcome: Ongoing, Progressing     Problem: Fall Injury Risk  Goal: Absence of Fall and Fall-Related Injury  Outcome: Ongoing, Progressing     Problem: Device-Related Complication Risk (Hemodialysis)  Goal: Safe, Effective Therapy Delivery  Outcome: Ongoing, Progressing     Problem: Hemodynamic Instability (Hemodialysis)  Goal: Effective Tissue Perfusion  Outcome: Ongoing, Progressing     Problem: Infection (Hemodialysis)  Goal: Absence of Infection Signs and Symptoms  Outcome: Ongoing, Progressing     Problem: Hemodynamic Instability (Hemodialysis)  Goal: Effective Tissue Perfusion  Outcome: Ongoing, Progressing

## 2023-03-14 NOTE — PROGRESS NOTES
[facility] HEMODIALYSIS NOTE    Cosme Lewis is a 57 y.o. male currently on hemodialysis for removal of uremic toxins and volume.     Patient seen and evaluated on hemodialysis, tolerating treatment, see HD flowsheet for vitals and assessments.    No Hypotension, chest pain, shortness of breath, cramping, nausea or vomiting.      Labs have been reviewed and the dialysate bath has been adjusted.     Labs:     Recent Labs   Lab 03/13/23  0430 03/13/23  1944 03/14/23  0607   * 134* 131*   K 5.2* 5.0 5.4*   CL 98 99 98   CO2 25 24 25   BUN 50* 60* 66*   CREATININE 4.6* 5.0* 5.5*   CALCIUM 8.0* 8.5* 8.4*   PHOS 3.5 3.5 4.0     Recent Labs   Lab 03/12/23  0729 03/12/23  0918 03/13/23  0430 03/14/23  0607   WBC 3.51*  --  2.96* 3.38*   HGB 9.1*  --  9.3* 9.2*   HCT 30.2* 30* 29.3* 30.0*     --  161 171     Lab Results   Component Value Date    FESATURATED 19 (L) 02/23/2023    FERRITIN 1,803 (H) 02/23/2023        Assessment/Plan      Ultrafiltration goal: Liters: 2L. Duration: 3 hours    - Seen on dialysis today, tolerating session with current UFR, no complications.     - No lab stick/BP intake on access site  - Continue to monitor intake and output, daily weights   - Please avoid gadolinium, fleets, phos-based laxatives, NSAIDs  - Will follow closely and continue dialysis treatments while in-patient    Anemia  - Hgb 9.2, Will plan for MARCIA with dialysis treatments    BMM  - Renal diet with protein intake goal 1.5 g/kg/d if appropriate   - Novasource with meals   - F/U PO4, Mg, Calcium. And albumin levels.   - Phos 4.0. No binders.     HTN  - BP Elevated  - Goal for BP <140mmHg SBP and <90 mmHg DBP. Maintain MAP > 65 mmHg.  - Continue home antihypertensive regimen; adjust as needed.       Rosi Grant, KUMAR, APRN, FNP-C  Nephrology Department  Pager:  [phone]

## 2023-03-14 NOTE — PT/OT/SLP PROGRESS
Speech Language Pathology Treatment  Discharge    Patient Name:  Cosme Lewis   MRN:  [id number]  Admitting Diagnosis: Acute on chronic respiratory failure with hypoxia    Recommendations:                 General Recommendations:  Follow-up not indicated  Diet recommendations:  Regular, Liquid Diet Level: Thin   Aspiration Precautions: Standard aspiration precautions   General Precautions: Standard, aspiration, fall  Communication strategies:  none    Subjective     Awake/alert    Pain/Comfort:  Pain Rating 1: 0/10  Pain Rating Post-Intervention 1: 0/10    Respiratory Status: Room air    Objective:     Has the patient been evaluated by SLP for swallowing?   Yes  Keep patient NPO? No     Pt asleep upon entry, but easily roused to voice. Chart indicated pt has been tolerating cracker and peanut butter without difficulty. He tolerated 1/2 eun cracker with thin liquids via straw x5 with timely swallow initiation, adequate bolus control and no overt s/s of airway compromise. Recommend regular diet/thin liquids at this time. No further ST warranted at this time. Please re-consult if further ST warranted.     Assessment:     Cosme Lewis is a 57 y.o. male with an SLP diagnosis of Dysphagia.      Goals:   Multidisciplinary Problems       SLP Goals          Problem: SLP    Goal Priority Disciplines Outcome   SLP Goal     SLP Ongoing, Progressing   Description: Speech Language Pathology Goals  Goals expected to be met by 3/26    1. Pt will tolerate dysphagia soft solids (level 6) and thin liquids without any overt s/sx of airway compromise.   2. Pt will independently implement swallow precautions during meal time.                                Plan:     Patient to be seen:  3 x/week   Plan of Care expires:  04/11/23  Plan of Care reviewed with:  patient   SLP Follow-Up:  No       Discharge recommendations:  other (see comments)       Time Tracking:     SLP Treatment Date:   03/14/23  Speech Start Time:  0943  Speech Stop Time:   0949     Speech Total Time (min):  6 min    Billable Minutes: Treatment Swallowing Dysfunction 6    03/14/2023

## 2023-03-14 NOTE — ASSESSMENT & PLAN NOTE
Hx of pulmonary thromboembolism within a lobar branch to the right lower lobe noting additional pulmonary thromboembolism within segmental branches to the right upper lobe in 10/2022    CTA on 3/12/23 negative for acute or chronic PE  BLE DVT US negative    - resume home eliquis, was held on 3/13 due to bleeding AVF

## 2023-03-14 NOTE — ASSESSMENT & PLAN NOTE
Lab Results   Component Value Date    HGBA1C 9.5 (H) 03/06/2023     Recent Labs   Lab 03/13/23  2351 03/14/23  0547 03/14/23  0813 03/14/23  1220   POCTGLUCOSE 147* 150* 154* 92     Current correctional scale  Medium  Maintain anti-hyperglycemic dose as follows-   Antihyperglycemics (From admission, onward)    Start     Stop Route Frequency Ordered    03/14/23 2100  insulin detemir U-100 pen 7 Units         -- SubQ 2 times daily 03/14/23 1506    03/14/23 1645  insulin aspart U-100 pen 4 Units         -- SubQ 3 times daily with meals 03/14/23 1507    03/12/23 2030  insulin aspart U-100 pen 0-5 Units         -- SubQ Before meals & nightly PRN 03/12/23 1931        Hold Oral hypoglycemics while patient is in the hospital.  POCT ACQ

## 2023-03-14 NOTE — PROGRESS NOTES
[facility] - Telemetry [facility] Medicine  Progress Note    Patient Name: Cosme Lewis  MRN: [id number]  Patient Class: IP- Inpatient   Admission Date: 3/10/2023  Length of Stay: 2 days  Attending Physician: Robbei Smith MD  Primary Care Provider: Primary Doctor No        Subjective:     Principal Problem:Acute on chronic respiratory failure with hypoxia        HPI:  Cosme Lewis is a 57 y.o. with CKD on HD MWF, DM1, Renovascular HTN, Chronic respiratory failure (on 3L of home O2), HFpEF, and Mild malnutrition who presents to the ED for vomiting and eye swelling. He began feeling nauseated with SOB, LE swelling, abdominal swelling, and facial swelling 3 days ago. He reports blurry vision associated with his eye swelling and discomfort. He has also had dizziness since the onset of symptoms. He reports vomiting twice today but denies blood in his vomit. Denies fever, chills, diarrhea, cough, dysuria, and chest pain. He still produces urine and his last complete session of HD was on 3/8 as he missed HD today due to feeling ill.       Of note, patient recently hospitalized in the MICU here and discharged on 3/7 to [facility] for hypertensive emergency and acute hypoxic respiratory failure.    In the ED, patient arrived afebrile and HDS satting well on RA. Labs remarkable for anemia 11 at baseline, elevated alk phos 261, normal lactate, and normal lipase. CT of the orbits demonstrated diffuse facial edema. CT A/P demonstrated pleural effusions, pulmonary edema, anasarca, and ascites.     Patient admitted to hospital medicine.       Overview/Hospital Course:  Patient admitted for volume overload 2/2 missed HD session and orbital swelling. Ophthalmology consulted, is not concerned for cellulitis at this time as imaging and clinical picture more consistent with edema. Nephrology consulted and assisting with HD coordination. On 3/11, patient noted to have oozing from AVF on LUE with distal arm edema. Vascular  surgery consulted, recommended holding eliquis and getting US of AVF with further evaluation to be done on 3/13. HD was performed on 3/11 with 3.5L removed. Patient developed acute hypoxic respiratory failure on 3/12 with an increased O2 requirement to 12L. CTA negative for PE but with GGO suggestive of volume overload. Pt noted to have vomitus w c/f possible aspiration PNA vs pneumonitis and was initiated on ceftriaxine/doxycycline. Oxygen requirements continuing to downtrend near pts baseline.      Interval History: Patient had some nausea overnight. AVF without bleeding this am. Satting >92% on 5L NC. Not voicing any complaints apart from hunger. Going for HD today.    Review of Systems   Constitutional:  Negative for chills and fever.   HENT:  Negative for rhinorrhea and sore throat.    Eyes:  Positive for visual disturbance.   Respiratory:  Negative for cough and shortness of breath.    Cardiovascular:  Negative for chest pain.   Gastrointestinal:  Positive for nausea and vomiting. Negative for abdominal pain, blood in stool and diarrhea.   Genitourinary:  Negative for dysuria and hematuria.   Neurological:  Positive for dizziness. Negative for light-headedness.   Psychiatric/Behavioral:  Negative for confusion and decreased concentration.    Objective:     Vital Signs (Most Recent):  Temp: (!) 32 °F (0 °C) (03/14/23 1300)  Pulse: (!) 59 (03/14/23 1132)  Resp: 18 (03/14/23 1132)  BP: (!) 152/79 (03/14/23 1132)  SpO2: 95 % (03/14/23 1132)   Vital Signs (24h Range):  Temp:  [32 °F (0 °C)-98 °F (36.7 °C)] 32 °F (0 °C)  Pulse:  [59-65] 59  Resp:  [16-18] 18  SpO2:  [93 %-99 %] 95 %  BP: (120-152)/(60-86) 152/79     Weight: 70.4 kg (155 lb 3.3 oz)  Body mass index is 22.92 kg/m².    Intake/Output Summary (Last 24 hours) at 3/14/2023 1508  Last data filed at 3/14/2023 0701  Gross per 24 hour   Intake 807.85 ml   Output 0 ml   Net 807.85 ml        Physical Exam  Vitals and nursing note reviewed.   Constitutional:        General: He is not in acute distress.     Appearance: Normal appearance. He is ill-appearing. He is not diaphoretic.   HENT:      Head: Normocephalic and atraumatic.      Right Ear: External ear normal.      Left Ear: External ear normal.      Nose:      Comments: NC half way in nares     Mouth/Throat:      Mouth: Mucous membranes are moist.   Eyes:      General: No scleral icterus.        Right eye: No discharge.         Left eye: No discharge.      Comments: Bilateral periorbital and orbital edema, R>L (improved)  Left eye with conjunctival injection   Cardiovascular:      Rate and Rhythm: Normal rate and regular rhythm.      Heart sounds: Normal heart sounds. No murmur heard.  Pulmonary:      Effort: No respiratory distress.      Breath sounds: No rales.   Abdominal:      General: There is distension.      Palpations: Abdomen is soft. There is no mass.      Tenderness: There is no abdominal tenderness.   Musculoskeletal:         General: Swelling present. No deformity. Normal range of motion.      Cervical back: Normal range of motion and neck supple.      Right lower leg: Edema (1+) present.      Left lower leg: Edema (1+) present.   Skin:     General: Skin is warm.      Coloration: Skin is not jaundiced.      Findings: No bruising.      Comments: AVF on LUE significantly enlarged/bulging with resolution of distal arm edema   Neurological:      Mental Status: He is alert and oriented to person, place, and time.   Psychiatric:         Mood and Affect: Mood normal.         Behavior: Behavior normal.         Thought Content: Thought content normal.       Significant Labs: All pertinent labs within the past 24 hours have been reviewed.    Significant Imaging: I have reviewed all pertinent imaging results/findings within the past 24 hours.      Assessment/Plan:      * Acute on chronic respiratory failure with hypoxia  Patient with Hypoxic Respiratory failure which is Acute on chronic.  he is on home oxygen at 3 LPM.  Supplemental oxygen was provided and noted-  .   Signs/symptoms of respiratory failure include- tachypnea, increased work of breathing, respiratory distress, use of accessory muscles and increased O2 requirement. Contributing diagnoses includes - ARDS, Aspiration, CHF, Pleural effusion, Pneumonia and Pulmonary Embolus Labs and images were reviewed. Patient Has recent ABG, which has been reviewed. Will treat underlying causes and adjust management of respiratory failure as follows.    CTA negative for PE but with GGO suggesting volume overload with atelectasis and bilateral pleural effusions  BLE DVT US negative  Concern for aspiration pneumonia vs volume overload/flash pulm edema.    - increased O2 requirement from 3L to 5L, wean down O2 as tolerated  - continue CTX and doxy for aspiration PNA  - nephrology consulted for HD  - aspiration precautions  - SLP evaluated, recommending regular diet    AVF (arteriovenous fistula)  AVF to LUE oozing blood from needle sites, significantly enlarged/buldging, and with distal arm edema    - vascular surgery consulted, appreciate assistance   - AVF US reviewed and not c/f stenosis, okay to use   - perform HD using AVF    Orbital edema  Patient presents with 3 days of orbital and periorbital edema right > left with associated blurry vision  No leukocytosis, afebrile, and HDS  Ophthalmology consulted in the ED, normal intraocular pressures  - CT orbits: facial edema including orbits     - appreciate ophthalmology assistance   - not concerned for cellulitis   - retina specialist follow up outpatient  - nephrology consulted for iHD volume removal    Improving    Type 2 diabetes mellitus with hyperglycemia, with long-term current use of insulin    Lab Results   Component Value Date    HGBA1C 9.5 (H) 03/06/2023     Recent Labs   Lab 03/13/23  2351 03/14/23  0547 03/14/23  0813 03/14/23  1220   POCTGLUCOSE 147* 150* 154* 92     Current correctional scale  Medium  Maintain  anti-hyperglycemic dose as follows-   Antihyperglycemics (From admission, onward)    Start     Stop Route Frequency Ordered    03/14/23 2100  insulin detemir U-100 pen 7 Units         -- SubQ 2 times daily 03/14/23 1506    03/14/23 1645  insulin aspart U-100 pen 4 Units         -- SubQ 3 times daily with meals 03/14/23 1507    03/12/23 2030  insulin aspart U-100 pen 0-5 Units         -- SubQ Before meals & nightly PRN 03/12/23 1931        Hold Oral hypoglycemics while patient is in the hospital.  POCT ACQHS    HLD (hyperlipidemia)  Continue home statin      Renovascular hypertension  Blood pressure 140/64 on arrival  Home regiment: clonidine, hydralazine, nifedipine, IMDUR, and coreg    - resume home regiment      Anemia in ESRD (end-stage renal disease)  Hb 11.1 on arrival near baseline.  No active signs or patient reports of bleeding.    - monitor CBC daily  - transfuse as needed for Hb < 7      Multiple subsegmental pulmonary emboli without acute cor pulmonale  Hx of pulmonary thromboembolism within a lobar branch to the right lower lobe noting additional pulmonary thromboembolism within segmental branches to the right upper lobe in 10/2022    CTA on 3/12/23 negative for acute or chronic PE  BLE DVT US negative    - resume home eliquis, was held on 3/13 due to bleeding AVF    Chronic diastolic heart failure  Results for orders placed during the hospital encounter of 11/18/22  Echo  · The left ventricle is normal in size with mild concentric hypertrophy and increased density and normal systolic function.  · The estimated ejection fraction is 63%.  · Grade II left ventricular diastolic dysfunction.  · Severe left atrial enlargement.  · Mild right ventricular enlargement with low normal right ventricular systolic function.  · There is right ventricular hypertrophy.  · Moderate right atrial enlargement.  · Mild to moderate tricuspid regurgitation.  · Mild pulmonic regurgitation.  · Elevated central venous pressure  (15 mmHg).  · The estimated PA systolic pressure is 67 mmHg.  · There is moderate-severe pulmonary hypertension.  · Trivial posterolateral pericardial effusion.  · There is a moderate left pleural effusion.  · There may be some ascites present.    Patient home medications include carvedilol and IMDUR  - continue home medications  - nephrology consulted for HD  - continue home O2  - strict I&O  - fluid restrict to 1.5 liter on cardiac diet    ESRD (end stage renal disease)  HD MWF with AVF to LUE  Patient last HD session on 3/8, missed session on 3/10 due to feeling ill  Still produces urine  Electrolytes stable and no urgent need for HD at this time although is volume overloaded and will need HD soon    - nephrology consulted for inpatient HD coordination, appreciate assistance  - renally dose all medications  - avoid nephrotoxic agents  - strict I&O  - renal diet fluid restricted to 1.5L  - monitor renal function daily      VTE Risk Mitigation (From admission, onward)         Ordered     apixaban tablet 5 mg  2 times daily         03/13/23 1610     IP VTE HIGH RISK PATIENT  Once         03/10/23 1815     Place sequential compression device  Until discontinued         03/10/23 1812                Discharge Planning   GERA: 3/15/2023     Code Status: Full Code   Is the patient medically ready for discharge?: No    Reason for patient still in hospital (select all that apply): Patient trending condition  Discharge Plan A: Return to nursing home                  Cecilia Dorman MD  Department of Hospital Medicine   [facility] - Telemetry Stepdown

## 2023-03-14 NOTE — ASSESSMENT & PLAN NOTE
Patient with Hypoxic Respiratory failure which is Acute on chronic.  he is on home oxygen at 3 LPM. Supplemental oxygen was provided and noted-  .   Signs/symptoms of respiratory failure include- tachypnea, increased work of breathing, respiratory distress, use of accessory muscles and increased O2 requirement. Contributing diagnoses includes - ARDS, Aspiration, CHF, Pleural effusion, Pneumonia and Pulmonary Embolus Labs and images were reviewed. Patient Has recent ABG, which has been reviewed. Will treat underlying causes and adjust management of respiratory failure as follows.    CTA negative for PE but with GGO suggesting volume overload with atelectasis and bilateral pleural effusions  BLE DVT US negative  Concern for aspiration pneumonia vs volume overload/flash pulm edema.    - increased O2 requirement from 3L to 5L, wean down O2 as tolerated  - continue CTX and doxy for aspiration PNA  - nephrology consulted for HD  - aspiration precautions  - SLP evaluated, recommending regular diet

## 2023-03-14 NOTE — ASSESSMENT & PLAN NOTE
AVF to LUE oozing blood from needle sites, significantly enlarged/buldging, and with distal arm edema    - vascular surgery consulted, appreciate assistance   - AVF US reviewed and not c/f stenosis, okay to use   - perform HD using AVF

## 2023-03-14 NOTE — PLAN OF CARE
Plan of Care Update    Bleeding to fistula resolved, patient tolerated HD appropriately.  No follow up needed at this time.  Vascular surgery will continue to follow peripherally.

## 2023-03-14 NOTE — SUBJECTIVE & OBJECTIVE
Interval History: Patient had some nausea overnight. AVF without bleeding this am. Satting >92% on 5L NC. Not voicing any complaints apart from hunger. Going for HD today.    Review of Systems   Constitutional:  Negative for chills and fever.   HENT:  Negative for rhinorrhea and sore throat.    Eyes:  Positive for visual disturbance.   Respiratory:  Negative for cough and shortness of breath.    Cardiovascular:  Negative for chest pain.   Gastrointestinal:  Positive for nausea and vomiting. Negative for abdominal pain, blood in stool and diarrhea.   Genitourinary:  Negative for dysuria and hematuria.   Neurological:  Positive for dizziness. Negative for light-headedness.   Psychiatric/Behavioral:  Negative for confusion and decreased concentration.    Objective:     Vital Signs (Most Recent):  Temp: (!) 32 °F (0 °C) (03/14/23 1300)  Pulse: (!) 59 (03/14/23 1132)  Resp: 18 (03/14/23 1132)  BP: (!) 152/79 (03/14/23 1132)  SpO2: 95 % (03/14/23 1132)   Vital Signs (24h Range):  Temp:  [32 °F (0 °C)-98 °F (36.7 °C)] 32 °F (0 °C)  Pulse:  [59-65] 59  Resp:  [16-18] 18  SpO2:  [93 %-99 %] 95 %  BP: (120-152)/(60-86) 152/79     Weight: 70.4 kg (155 lb 3.3 oz)  Body mass index is 22.92 kg/m².    Intake/Output Summary (Last 24 hours) at 3/14/2023 1508  Last data filed at 3/14/2023 0701  Gross per 24 hour   Intake 807.85 ml   Output 0 ml   Net 807.85 ml        Physical Exam  Vitals and nursing note reviewed.   Constitutional:       General: He is not in acute distress.     Appearance: Normal appearance. He is ill-appearing. He is not diaphoretic.   HENT:      Head: Normocephalic and atraumatic.      Right Ear: External ear normal.      Left Ear: External ear normal.      Nose:      Comments: NC half way in nares     Mouth/Throat:      Mouth: Mucous membranes are moist.   Eyes:      General: No scleral icterus.        Right eye: No discharge.         Left eye: No discharge.      Comments: Bilateral periorbital and orbital  edema, R>L (improved)  Left eye with conjunctival injection   Cardiovascular:      Rate and Rhythm: Normal rate and regular rhythm.      Heart sounds: Normal heart sounds. No murmur heard.  Pulmonary:      Effort: No respiratory distress.      Breath sounds: No rales.   Abdominal:      General: There is distension.      Palpations: Abdomen is soft. There is no mass.      Tenderness: There is no abdominal tenderness.   Musculoskeletal:         General: Swelling present. No deformity. Normal range of motion.      Cervical back: Normal range of motion and neck supple.      Right lower leg: Edema (1+) present.      Left lower leg: Edema (1+) present.   Skin:     General: Skin is warm.      Coloration: Skin is not jaundiced.      Findings: No bruising.      Comments: AVF on LUE significantly enlarged/bulging with resolution of distal arm edema   Neurological:      Mental Status: He is alert and oriented to person, place, and time.   Psychiatric:         Mood and Affect: Mood normal.         Behavior: Behavior normal.         Thought Content: Thought content normal.       Significant Labs: All pertinent labs within the past 24 hours have been reviewed.    Significant Imaging: I have reviewed all pertinent imaging results/findings within the past 24 hours.

## 2023-03-14 NOTE — ASSESSMENT & PLAN NOTE
Patient presents with 3 days of orbital and periorbital edema right > left with associated blurry vision  No leukocytosis, afebrile, and HDS  Ophthalmology consulted in the ED, normal intraocular pressures  - CT orbits: facial edema including orbits     - appreciate ophthalmology assistance   - not concerned for cellulitis   - retina specialist follow up outpatient  - nephrology consulted for iHD volume removal    Improving

## 2023-03-14 NOTE — PLAN OF CARE
Problem: Device-Related Complication Risk (Hemodialysis)  Goal: Safe, Effective Therapy Delivery  Outcome: Ongoing, Not Progressing     Problem: Hemodynamic Instability (Hemodialysis)  Goal: Effective Tissue Perfusion  Outcome: Ongoing, Not Progressing

## 2023-03-14 NOTE — PROGRESS NOTES
Patient came in [facility] via bed AAO. Off day maintenance hemodialysis started via UMA AV fistula cannulated with gauge 16 needles without issues.

## 2023-03-14 NOTE — NURSING
Pt HS BG=67. 3 pks eun's w/ 2 p'nut butter & SF jelly plus 1 (240ML) LF milk given. Pt ate 100%. BG recheck at 23:76=986.   Narda. 8 units Detemir adm. Per MAR. WCTM

## 2023-03-14 NOTE — PROGRESS NOTES
Hemodialysis treatment completed. Dialyzed patient for 3  hours with total fluid removal of 2 L. Patient tolerated treatment well.    Arterial and venous gauge 16 needles removed. Pressure held for 7 minutes until hemostasis is achieved.    Waiting for transport personnel to escort patient back to his room.

## 2023-03-15 PROBLEM — J44.9 COPD (CHRONIC OBSTRUCTIVE PULMONARY DISEASE): Status: ACTIVE | Noted: 2023-01-01

## 2023-03-15 PROBLEM — I27.20 PULMONARY HYPERTENSION: Chronic | Status: ACTIVE | Noted: 2022-01-01

## 2023-03-15 NOTE — PT/OT/SLP PROGRESS
Physical Therapy      Patient Name:  Cosme Lewis   MRN:  [id number]    Patient not seen today secondary to Patient unwilling to participate, Other (Comment) (Pt reported he is not getting up multiple times then stated maybe after he eats, He may get up OOB.). Will follow-up 3/16/23.

## 2023-03-15 NOTE — PLAN OF CARE
NURSING HOME ORDERS    03/15/2023  [facility]  BELLA COLLINS - TELEMETRY STEPDOWN  [address]  [facility] [address]  Dept: [phone]  Loc: [phone]     Admit to Nursing Home:  senior care    Diagnoses:  Active Hospital Problems    Diagnosis  POA    *Acute on chronic respiratory failure with hypoxia [J96.21]  Yes    COPD (chronic obstructive pulmonary disease) [J44.9]  Yes    Leukopenia [D72.819]  Unknown    AVF (arteriovenous fistula) [I77.0]  Unknown    Orbital edema [H05.229]  Unknown    Type 2 diabetes mellitus with hyperglycemia, with long-term current use of insulin [E11.65, Z79.4]  Not Applicable     Chronic    Pulmonary hypertension [I27.20]  Yes     Chronic    Anemia in ESRD (end-stage renal disease) [N18.6, D63.1]  Yes     Chronic    HLD (hyperlipidemia) [E78.5]  Yes     Chronic    Renovascular hypertension [I15.0]  Yes     Chronic    Multiple subsegmental pulmonary emboli without acute cor pulmonale [I26.94]  Yes     Chronic    Chronic diastolic heart failure [I50.32]  Yes     Chronic    Chronic kidney disease-mineral and bone disorder [N18.9, E83.9, M89.9]  Yes    Chronic hypoxemic respiratory failure [J96.11]  Yes     Chronic    ESRD (end stage renal disease) [N18.6]  Unknown      Resolved Hospital Problems   No resolved problems to display.       Patient is homebound due to:  Acute on chronic respiratory failure with hypoxia    Allergies:Review of patient's allergies indicates:  No Known Allergies    Vitals:  Routine    Diet: renal diet    Activities:   Activity as tolerated    Goals of Care Treatment Preferences:  Code Status: Full Code      Labs:  Per protocol    Nursing Precautions:  Aspiration  and Pressure ulcer prevention    Consults:   PT to evaluate and treat- 3 times a week and OT to evaluate and treat- 3 times a week     Miscellaneous Care: Diabetes Care: Diabetes: Check blood sugar. Fingerstick blood sugar AC and HS  Sliding Scale/Hypoglycemia Protocol:  For FSG<80, give 1 amp D50 or 1 glucose tablet. For FSG , do nothing. For -200, give 1 unit of novolog in addition to pre-meal insulin. For -250, give 2 units of novolog in addition to pre-meal insulin. For -300, give 3 units of novolog in addition to pre-meal insulin. For 301-350, give 4 units of novolog in addition to pre-meal insulin. For 351-400, give 5 units of novolog in addition to pre-meal insulin. For FSG >400, give 5 units of novolog in addition to pre-meal insulin and please call MD    Baseline oxygen requirement 3-5L nasal cannula. Goal SpO2 88-92%                    Diabetes Care:  SN to perform and educate Diabetic management with blood glucose monitoring:      Medications: Discontinue all previous medication orders, if any. See new list below.     Medication List        START taking these medications      doxycycline 100 MG tablet  Commonly known as: VIBRA-TABS  Take 1 tablet (100 mg total) by mouth every 12 (twelve) hours. for 1 day     epoetin xavier 10,000 unit/mL injection  Commonly known as: PROCRIT  Inject 0.7 mLs (7,000 Units total) into the skin every Tues, Thurs, Sat.  Start taking on: March 16, 2023            CHANGE how you take these medications      cetirizine 10 MG tablet  Commonly known as: ZYRTEC  Take 10 mg by mouth daily as needed (itching).  What changed: Another medication with the same name was removed. Continue taking this medication, and follow the directions you see here.     insulin detemir U-100 100 unit/mL (3 mL) Inpn pen  Commonly known as: Levemir FLEXTOUCH  Inject 7 Units into the skin 2 (two) times daily.  What changed: Another medication with the same name was removed. Continue taking this medication, and follow the directions you see here.            CONTINUE taking these medications      acetaminophen 650 MG Tbsr  Commonly known as: TYLENOL  Take 650 mg by mouth every 8 (eight) hours as needed (pain or fever over 100.4).     albuterol 90  mcg/actuation inhaler  Commonly known as: PROVENTIL/VENTOLIN HFA  Inhale 2 puffs into the lungs every 6 (six) hours as needed for Wheezing or Shortness of Breath.     albuterol-ipratropium 2.5 mg-0.5 mg/3 mL nebulizer solution  Commonly known as: DUO-NEB  Take 3 mLs by nebulization every 4 (four) hours while awake. Rescue     apixaban 5 mg Tab  Commonly known as: ELIQUIS  Take 5 mg by mouth 2 (two) times daily.     aspirin 81 MG EC tablet  Commonly known as: ECOTRIN  Take 81 mg by mouth once daily.     atorvastatin 40 MG tablet  Commonly known as: LIPITOR  Take 1 tablet (40 mg total) by mouth once daily.     carvediloL 3.125 MG tablet  Commonly known as: COREG  Take 2 tablets (6.25 mg total) by mouth 2 (two) times daily.     cloNIDine 0.3 mg/24 hr td ptwk 0.3 mg/24 hr  Commonly known as: CATAPRES  Place 1 patch onto the skin every Saturday.     erythromycin ophthalmic ointment  Commonly known as: ROMYCIN  Place a 1/2 inch ribbon of ointment into the lower eyelid three times a day.     FLUoxetine 40 MG capsule  Take 40 mg by mouth once daily.     fluticasone-salmeterol 250-50 mcg/dose 250-50 mcg/dose diskus inhaler  Commonly known as: ADVAIR  Inhale 1 puff into the lungs 2 (two) times daily.     GLUCAGON EMERGENCY KIT (HUMAN) 1 mg Solr  Generic drug: glucagon  1 mg into the muscle as needed if CBG < 70     hydrALAZINE 100 MG tablet  Commonly known as: APRESOLINE  Take 1 tablet (100 mg total) by mouth every 8 (eight) hours.     HYDROPHILIC CREAM TOP  Apply liberal amount to affected area twice daily for dry skin     insulin lispro 100 unit/mL injection  Commonly known as: HumaLOG U-100 Insulin  Inject 5 Units into the skin 3 (three) times daily before meals.     isosorbide mononitrate 30 MG 24 hr tablet  Commonly known as: IMDUR  Take 30 mg by mouth 2 (two) times daily.     melatonin 3 mg Tbdl  Take 9 mg by mouth nightly as needed (sleep).     NEPRO CARB STEADY ORAL  Give 1 carton by mouth with each meal.      NIFEdipine 90 MG (OSM) 24 hr tablet  Commonly known as: PROCARDIA-XL  Take 1 tablet (90 mg total) by mouth once daily.     sodium chloride 0.65 % nasal spray  Commonly known as: OCEAN  2 sprays by Nasal route every 4 (four) hours as needed for Congestion.     tiotropium bromide 2.5 mcg/actuation inhaler  Commonly known as: SPIRIVA RESPIMAT  Inhale 2 puffs into the lungs Daily.     triamcinolone acetonide 0.025 % Lotn  Apply topically. Apply to the affected area twice daily     vitamin renal formula (B-complex-vitamin c-folic acid) 1 mg Cap  Commonly known as: NEPHROCAP  Take 1 capsule by mouth once daily.            STOP taking these medications      insulin aspart U-100 100 unit/mL (3 mL) Inpn pen  Commonly known as: NovoLOG                Immunizations Administered as of 3/15/2023       Name Date Dose VIS Date Route Exp Date    COVID-19, MRNA, LN-S, PF (Moderna) 1/27/2022 -- -- -- --    COVID-19, MRNA, LN-S, PF (Pfizer) (Purple Cap) 3/2/2021 -- -- -- --    : Pf[employer] Inc     Lot: [id number]     COVID-19, MRNA, LN-S, PF (Pfizer) (Purple Cap) 2/9/2021 -- -- -- --    : Pf[employer] Inc     Lot: [id number]             This patient has had both covid vaccinations    Some patients may experience side effects after vaccination.  These may include fever, headache, muscle or joint aches.  Most symptoms resolve with 24-48 hours and do not require urgent medical evaluation unless they persist for more than 72 hours or symptoms are concerning for an unrelated medical condition.          _________________________________  Nuvia Gould DO  03/15/2023

## 2023-03-15 NOTE — SUBJECTIVE & OBJECTIVE
Interval History:   HD completed on yesterday with 2 L removed. Electrolytes non emergent. No distress on exam.     Review of patient's allergies indicates:  No Known Allergies  Current Facility-Administered Medications   Medication Frequency    albuterol-ipratropium 2.5 mg-0.5 mg/3 mL nebulizer solution 3 mL Q4H PRN    apixaban tablet 5 mg BID    artificial tears 0.5 % ophthalmic solution 1 drop QID PRN    aspirin chewable tablet 81 mg Daily    atorvastatin tablet 40 mg Daily    carvediloL tablet 6.25 mg BID    cefTRIAXone (ROCEPHIN) 1 g in dextrose 5 % in water (D5W) 5 % 50 mL IVPB (MB+) Q24H    cetirizine tablet 5 mg Daily PRN    cloNIDine 0.3 mg/24 hr td ptwk 1 patch Every Sat    dextrose 10% bolus 125 mL 125 mL PRN    dextrose 10% bolus 250 mL 250 mL PRN    doxycycline tablet 100 mg Q12H    epoetin xavier injection 7,000 Units Every Tues, Thurs, Sat    FLUoxetine capsule 40 mg Daily    glucagon (human recombinant) injection 1 mg PRN    hydrALAZINE tablet 100 mg Q8H    insulin aspart U-100 pen 0-5 Units QID (AC + HS) PRN    insulin aspart U-100 pen 4 Units TIDWM    isosorbide mononitrate 24 hr tablet 30 mg BID    melatonin tablet 6 mg Nightly PRN    naloxone 0.4 mg/mL injection 0.02 mg PRN    NIFEdipine 24 hr tablet 90 mg Daily    tiotropium bromide 2.5 mcg/actuation inhaler 2 puff Daily       Objective:     Vital Signs (Most Recent):  Temp: 97.6 °F (36.4 °C) (03/15/23 0755)  Pulse: 66 (03/15/23 1055)  Resp: 19 (03/15/23 1055)  BP: (!) 170/92 (03/15/23 0755)  SpO2: 97 % (03/15/23 1055)   Vital Signs (24h Range):  Temp:  [32 °F (0 °C)-99 °F (37.2 °C)] 97.6 °F (36.4 °C)  Pulse:  [58-69] 66  Resp:  [17-20] 19  SpO2:  [90 %-100 %] 97 %  BP: (136-185)/(62-96) 170/92     Weight: 70.4 kg (155 lb 3.3 oz) (03/10/23 2205)  Body mass index is 22.92 kg/m².  Body surface area is 1.85 meters squared.    I/O last 3 completed shifts:  In: 427.9 [P.O.:360; IV Piggyback:67.9]  Out: 2600 [Other:2600]    Physical Exam  Vitals and  nursing note reviewed.   Constitutional:       General: He is not in acute distress.     Appearance: He is obese. He is not ill-appearing or toxic-appearing.   HENT:      Mouth/Throat:      Mouth: Mucous membranes are moist.      Pharynx: Oropharynx is clear. No oropharyngeal exudate or posterior oropharyngeal erythema.   Eyes:      General: No scleral icterus.        Right eye: No discharge.         Left eye: No discharge.      Extraocular Movements: Extraocular movements intact.      Conjunctiva/sclera: Conjunctivae normal.      Comments: R orbital edema   Cardiovascular:      Rate and Rhythm: Normal rate and regular rhythm.      Comments: UMA AVF ++ bruit/thrill  Pulmonary:      Effort: Pulmonary effort is normal. No respiratory distress.      Breath sounds: No wheezing or rales.   Abdominal:      General: There is distension.      Palpations: Abdomen is soft.      Tenderness: There is no abdominal tenderness.   Musculoskeletal:         General: Swelling (LFA) present.      Cervical back: Normal range of motion and neck supple.      Right lower leg: No edema.      Left lower leg: No edema.   Skin:     General: Skin is warm and dry.   Neurological:      General: No focal deficit present.      Mental Status: He is alert and oriented to person, place, and time.   Psychiatric:         Mood and Affect: Mood normal.         Behavior: Behavior normal.       Significant Labs:  CBC:   Recent Labs   Lab 03/15/23  0459   WBC 3.37*   RBC 3.21*   HGB 9.3*   HCT 30.2*      MCV 94   MCH 29.0   MCHC 30.8*     CMP:   Recent Labs   Lab 03/15/23  0459      CALCIUM 8.2*   ALBUMIN 2.8*   PROT 6.1   *   K 4.9   CO2 23      BUN 49*   CREATININE 4.4*   ALKPHOS 220*   ALT 34   AST 51*   BILITOT 0.6     All labs within the past 24 hours have been reviewed.

## 2023-03-15 NOTE — ASSESSMENT & PLAN NOTE
ESRD on iHD MWF  [facility]  4 hours  EDW: ?68.5 kg  Minimal residual renal fx  UMA AVF    Plan/Recommendations:  -No emergent need for HD today  -Will plan for HD tomorrow for clearance and volume management  -strict I/O's  -place on fluid restrictions of 1L

## 2023-03-15 NOTE — SUBJECTIVE & OBJECTIVE
Interval History: NAEON. Patient complains of hunger again this am prior to arrival of breakfast tray. Patient allowed a second breakfast tray. Satting >93% on 5L NC. Patient medically ready for discharge back to nursing home.     Review of Systems   Constitutional:  Negative for chills and fever.   HENT:  Negative for rhinorrhea and sore throat.    Eyes:  Positive for visual disturbance.   Respiratory:  Negative for cough and shortness of breath.    Cardiovascular:  Negative for chest pain.   Gastrointestinal:  Negative for abdominal pain, blood in stool and diarrhea.   Genitourinary:  Negative for dysuria and hematuria.   Neurological:  Negative for light-headedness.   Psychiatric/Behavioral:  Negative for confusion and decreased concentration.    Objective:     Vital Signs (Most Recent):  Temp: 97.6 °F (36.4 °C) (03/15/23 0755)  Pulse: 66 (03/15/23 1055)  Resp: 19 (03/15/23 1055)  BP: (!) 170/92 (03/15/23 0755)  SpO2: 97 % (03/15/23 1055)   Vital Signs (24h Range):  Temp:  [32 °F (0 °C)-99 °F (37.2 °C)] 97.6 °F (36.4 °C)  Pulse:  [58-69] 66  Resp:  [17-20] 19  SpO2:  [90 %-100 %] 97 %  BP: (136-185)/(62-96) 170/92     Weight: 70.4 kg (155 lb 3.3 oz)  Body mass index is 22.92 kg/m².    Intake/Output Summary (Last 24 hours) at 3/15/2023 1116  Last data filed at 3/14/2023 1830  Gross per 24 hour   Intake --   Output 2600 ml   Net -2600 ml        Physical Exam  Vitals and nursing note reviewed.   Constitutional:       General: He is not in acute distress.     Appearance: Normal appearance. He is ill-appearing (chronically). He is not diaphoretic.   HENT:      Head: Normocephalic and atraumatic.      Right Ear: External ear normal.      Left Ear: External ear normal.      Nose:      Comments: NC in place     Mouth/Throat:      Mouth: Mucous membranes are moist.   Eyes:      General: No scleral icterus.        Right eye: No discharge.         Left eye: No discharge.      Comments: Bilateral periorbital and orbital  edema, R>L (improved)  Left eye with conjunctival injection   Cardiovascular:      Rate and Rhythm: Normal rate and regular rhythm.      Heart sounds: Normal heart sounds. No murmur heard.  Pulmonary:      Effort: No respiratory distress.      Breath sounds: No rales.   Abdominal:      General: There is distension.      Palpations: Abdomen is soft. There is no mass.      Tenderness: There is no abdominal tenderness.   Musculoskeletal:         General: No swelling or deformity. Normal range of motion.      Cervical back: Normal range of motion and neck supple.      Right lower leg: No edema.      Left lower leg: No edema.   Skin:     General: Skin is warm.      Coloration: Skin is not jaundiced.      Findings: No bruising.      Comments: AVF on LUE enlarged/bulging with resolution of distal arm edema   Neurological:      Mental Status: He is alert and oriented to person, place, and time.   Psychiatric:         Mood and Affect: Mood normal.         Behavior: Behavior normal.         Thought Content: Thought content normal.       Significant Labs: All pertinent labs within the past 24 hours have been reviewed.    Significant Imaging: I have reviewed all pertinent imaging results/findings within the past 24 hours.

## 2023-03-15 NOTE — PLAN OF CARE
Per MD team, patient is ready for dc today. Spoke with Ngozi ([phone]) in admissions at [facility] who reports patient may return today and does not need a covid test. ADALGISA will send NH orders when available.    9:39 AM  Sent NH orders and updated clinicals to [facility] in careport.    2:25 PM  Attempted to contact Nonya at [facility] and left voicemail requesting return call.    3:51 PM   03/15/23 1551   Post-Acute Status   Post-Acute Authorization Placement   Post-Acute Placement Status Set-up Complete/Auth obtained   Per Ngozi in admissions at [facility], nurse can call report to [phone] and patient will return to room 209B. Updated RN and MD.    ADAGLISA arranged stretcher transport via [facility]. Requested  time is 4:00 PM. Requested  time does not guarantee arrival time.    Attempted to contact patient's mother to update and left voicemail.    Jessica Roberts LCSW  [facility]  Ext. [phone]

## 2023-03-15 NOTE — PLAN OF CARE
Problem: Adult Inpatient Plan of Care  Goal: Plan of Care Review  Outcome: Ongoing, Progressing  Goal: Patient-Specific Goal (Individualized)  Outcome: Ongoing, Progressing  Goal: Optimal Comfort and Wellbeing  Outcome: Ongoing, Progressing     Problem: Skin Injury Risk Increased  Goal: Skin Health and Integrity  Outcome: Ongoing, Progressing

## 2023-03-15 NOTE — ASSESSMENT & PLAN NOTE
hgb @ goal for ESRD  Hgb 9.3  -will consider MARCIA therapy if patient remain inpatient, can otherwise resume as OP

## 2023-03-15 NOTE — PROGRESS NOTES
[facility] - Telemetry Stepdown  Nephrology  Progress Note    Patient Name: Cosme Lewis  MRN: [id number]  Admission Date: 3/10/2023  Hospital Length of Stay: 3 days  Attending Provider: Robbie Smith MD   Primary Care Physician: Primary Doctor No  Principal Problem:Acute on chronic respiratory failure with hypoxia    Subjective:     Interval History:   HD completed on yesterday with 2 L removed. Electrolytes non emergent. No distress on exam.     Review of patient's allergies indicates:  No Known Allergies  Current Facility-Administered Medications   Medication Frequency    albuterol-ipratropium 2.5 mg-0.5 mg/3 mL nebulizer solution 3 mL Q4H PRN    apixaban tablet 5 mg BID    artificial tears 0.5 % ophthalmic solution 1 drop QID PRN    aspirin chewable tablet 81 mg Daily    atorvastatin tablet 40 mg Daily    carvediloL tablet 6.25 mg BID    cefTRIAXone (ROCEPHIN) 1 g in dextrose 5 % in water (D5W) 5 % 50 mL IVPB (MB+) Q24H    cetirizine tablet 5 mg Daily PRN    cloNIDine 0.3 mg/24 hr td ptwk 1 patch Every Sat    dextrose 10% bolus 125 mL 125 mL PRN    dextrose 10% bolus 250 mL 250 mL PRN    doxycycline tablet 100 mg Q12H    epoetin xavier injection 7,000 Units Every Tues, Thurs, Sat    FLUoxetine capsule 40 mg Daily    glucagon (human recombinant) injection 1 mg PRN    hydrALAZINE tablet 100 mg Q8H    insulin aspart U-100 pen 0-5 Units QID (AC + HS) PRN    insulin aspart U-100 pen 4 Units TIDWM    isosorbide mononitrate 24 hr tablet 30 mg BID    melatonin tablet 6 mg Nightly PRN    naloxone 0.4 mg/mL injection 0.02 mg PRN    NIFEdipine 24 hr tablet 90 mg Daily    tiotropium bromide 2.5 mcg/actuation inhaler 2 puff Daily       Objective:     Vital Signs (Most Recent):  Temp: 97.6 °F (36.4 °C) (03/15/23 0755)  Pulse: 66 (03/15/23 1055)  Resp: 19 (03/15/23 1055)  BP: (!) 170/92 (03/15/23 0755)  SpO2: 97 % (03/15/23 1055)   Vital Signs (24h Range):  Temp:  [32 °F (0 °C)-99 °F (37.2 °C)] 97.6 °F  (36.4 °C)  Pulse:  [58-69] 66  Resp:  [17-20] 19  SpO2:  [90 %-100 %] 97 %  BP: (136-185)/(62-96) 170/92     Weight: 70.4 kg (155 lb 3.3 oz) (03/10/23 2205)  Body mass index is 22.92 kg/m².  Body surface area is 1.85 meters squared.    I/O last 3 completed shifts:  In: 427.9 [P.O.:360; IV Piggyback:67.9]  Out: 2600 [Other:2600]    Physical Exam  Vitals and nursing note reviewed.   Constitutional:       General: He is not in acute distress.     Appearance: He is obese. He is not ill-appearing or toxic-appearing.   HENT:      Mouth/Throat:      Mouth: Mucous membranes are moist.      Pharynx: Oropharynx is clear. No oropharyngeal exudate or posterior oropharyngeal erythema.   Eyes:      General: No scleral icterus.        Right eye: No discharge.         Left eye: No discharge.      Extraocular Movements: Extraocular movements intact.      Conjunctiva/sclera: Conjunctivae normal.      Comments: R orbital edema   Cardiovascular:      Rate and Rhythm: Normal rate and regular rhythm.      Comments: UMA AVF ++ bruit/thrill  Pulmonary:      Effort: Pulmonary effort is normal. No respiratory distress.      Breath sounds: No wheezing or rales.   Abdominal:      General: There is distension.      Palpations: Abdomen is soft.      Tenderness: There is no abdominal tenderness.   Musculoskeletal:         General: Swelling (LFA) present.      Cervical back: Normal range of motion and neck supple.      Right lower leg: No edema.      Left lower leg: No edema.   Skin:     General: Skin is warm and dry.   Neurological:      General: No focal deficit present.      Mental Status: He is alert and oriented to person, place, and time.   Psychiatric:         Mood and Affect: Mood normal.         Behavior: Behavior normal.       Significant Labs:  CBC:   Recent Labs   Lab 03/15/23  0459   WBC 3.37*   RBC 3.21*   HGB 9.3*   HCT 30.2*      MCV 94   MCH 29.0   MCHC 30.8*     CMP:   Recent Labs   Lab 03/15/23  0459      CALCIUM  8.2*   ALBUMIN 2.8*   PROT 6.1   *   K 4.9   CO2 23      BUN 49*   CREATININE 4.4*   ALKPHOS 220*   ALT 34   AST 51*   BILITOT 0.6     All labs within the past 24 hours have been reviewed.     Assessment/Plan:     Pulmonary  * Acute on chronic respiratory failure with hypoxia  - defer to primary care team     Renal/  Chronic kidney disease-mineral and bone disorder  -renal diet  -phos WNL, hold phos binders for now.   -check phos with labs    ESRD (end stage renal disease)  ESRD on iHD MWF  [facility]  Dr. Holden  4 hours  EDW: ?68.5 kg  Minimal residual renal fx  UMA AVF    Plan/Recommendations:  -No emergent need for HD today  -Will plan for HD tomorrow for clearance and volume management  -strict I/O's  -place on fluid restrictions of 1L    Oncology  Anemia in ESRD (end-stage renal disease)  hgb @ goal for ESRD  Hgb 9.3  -will consider MARCIA therapy if patient remain inpatient, can otherwise resume as OP        Thank you for your consult. I will follow-up with patient. Please contact us if you have any additional questions.    Rosi Grant DNP, FNP-C  Nephrology  [facility] - Telemetry Stepdown

## 2023-03-17 PROBLEM — S30.812A: Status: ACTIVE | Noted: 2023-01-01

## 2023-03-17 PROBLEM — E87.5 HYPERKALEMIA: Status: ACTIVE | Noted: 2023-01-01

## 2023-03-17 NOTE — ASSESSMENT & PLAN NOTE
Patient presents with skin break down to penis 2/2 condom catheter use.    - barrier ointment BID  - wound care consulted, appreciate assitance

## 2023-03-17 NOTE — SUBJECTIVE & OBJECTIVE
Past Medical History:   Diagnosis Date    Chronic kidney disease-mineral and bone disorder 10/12/2022    COPD (chronic obstructive pulmonary disease)     Diabetes mellitus type 1     ESRD on dialysis     Hypertension     Hypervolemia 2/22/2023    Hyponatremia 3/4/2023    SOB (shortness of breath) 10/12/2022    Patient with history COPD treated with Ellipta the albuterol, fluticasone-salmeterol tachypneic in the ED saturating 94% and 6 L on nasal cannula, patient does not know how many  he uses it is in nursing home.    -duo nebs Q 4  Given that patient is a poor historian, and in the setting of left lower extremity edema and history of coagulopathy PE cannot be ruled out.  Will workup for possible infec       Past Surgical History:   Procedure Laterality Date    AV FISTULA PLACEMENT         Review of patient's allergies indicates:  No Known Allergies  Current Facility-Administered Medications   Medication Frequency    [START ON 3/18/2023] 0.9%  NaCl infusion Once    albuterol-ipratropium 2.5 mg-0.5 mg/3 mL nebulizer solution 3 mL Q4H PRN    apixaban tablet 5 mg BID    aspirin EC tablet 81 mg Daily    atorvastatin tablet 40 mg Daily    carvediloL tablet 6.25 mg BID    cloNIDine 0.2 mg/24 hr td ptwk 1 patch ED 1 Time    dextrose 10% bolus 125 mL 125 mL PRN    dextrose 10% bolus 250 mL 250 mL PRN    dextrose 40 % gel 15,000 mg PRN    dextrose 40 % gel 30,000 mg PRN    FLUoxetine capsule 40 mg Daily    glucagon (human recombinant) injection 1 mg PRN    hydrALAZINE tablet 100 mg Q8H    insulin aspart U-100 pen 0-5 Units QID (AC + HS) PRN    isosorbide mononitrate 24 hr tablet 30 mg BID    melatonin tablet 6 mg Nightly PRN    mupirocin 2 % ointment BID    naloxone 0.4 mg/mL injection 0.02 mg PRN    NIFEdipine 24 hr tablet 90 mg Daily    sodium chloride 0.9% flush 10 mL PRN    sodium chloride 0.9% flush 10 mL Q12H PRN    tiotropium bromide 2.5 mcg/actuation inhaler 2 puff Daily    vitamin renal formula (B-complex-vitamin  c-folic acid) 1 mg per capsule 1 capsule Daily     Current Outpatient Medications   Medication    acetaminophen (TYLENOL) 650 MG TbSR    albuterol (PROVENTIL/VENTOLIN HFA) 90 mcg/actuation inhaler    albuterol-ipratropium (DUO-NEB) 2.5 mg-0.5 mg/3 mL nebulizer solution    apixaban (ELIQUIS) 5 mg Tab    aspirin (ECOTRIN) 81 MG EC tablet    atorvastatin (LIPITOR) 40 MG tablet    carvediloL (COREG) 3.125 MG tablet    cetirizine (ZYRTEC) 10 MG tablet    cloNIDine 0.3 mg/24 hr td ptwk (CATAPRES) 0.3 mg/24 hr    epoetin xavier (PROCRIT) 10,000 unit/mL injection    erythromycin (ROMYCIN) ophthalmic ointment    FLUoxetine 40 MG capsule    fluticasone-salmeterol diskus inhaler 250-50 mcg    GLUCAGON EMERGENCY KIT, HUMAN, 1 mg injection    hydrALAZINE (APRESOLINE) 100 MG tablet    HYDROPHILIC CREAM TOP    insulin detemir U-100 (LEVEMIR FLEXTOUCH) 100 unit/mL (3 mL) SubQ InPn pen    insulin lispro (HUMALOG U-100 INSULIN) 100 unit/mL injection    isosorbide mononitrate (IMDUR) 30 MG 24 hr tablet    melatonin 3 mg TbDL    NIFEdipine (PROCARDIA-XL) 90 MG (OSM) 24 hr tablet    nut.tx.imp.renal fxn,lac-reduc (NEPRO CARB STEADY ORAL)    sodium chloride (OCEAN) 0.65 % nasal spray    tiotropium bromide (SPIRIVA RESPIMAT) 2.5 mcg/actuation inhaler    triamcinolone acetonide 0.025 % Lotn    vitamin renal formula, B-complex-vitamin c-folic acid, (NEPHROCAP) 1 mg Cap     Family History       Problem Relation (Age of Onset)    Diabetes Mother          Tobacco Use    Smoking status: Never    Smokeless tobacco: Never   Substance and Sexual Activity    Alcohol use: Not Currently    Drug use: Not on file    Sexual activity: Not Currently     Review of Systems   Constitutional: Negative.    HENT: Negative.     Eyes: Negative.    Respiratory:  Positive for shortness of breath.    Cardiovascular: Negative.    Musculoskeletal: Negative.    Skin: Negative.    Neurological: Negative.    Psychiatric/Behavioral: Negative.     Objective:     Vital Signs  (Most Recent):  Temp: 97.4 °F (36.3 °C) (03/17/23 0800)  Pulse: 65 (03/17/23 1016)  Resp: 19 (03/17/23 1016)  BP: (!) 180/88 (03/17/23 1016)  SpO2: (!) 93 % (03/17/23 1016)   Vital Signs (24h Range):  Temp:  [97.4 °F (36.3 °C)-97.9 °F (36.6 °C)] 97.4 °F (36.3 °C)  Pulse:  [57-77] 65  Resp:  [15-30] 19  SpO2:  [84 %-100 %] 93 %  BP: (170-218)/() 180/88     Weight: 70.8 kg (156 lb) (03/16/23 2336)  Body mass index is 23.04 kg/m².  Body surface area is 1.86 meters squared.    I/O last 3 completed shifts:  In: 44.6 [IV Piggyback:44.6]  Out: -     Physical Exam  Constitutional:       Appearance: He is ill-appearing.   HENT:      Head: Normocephalic and atraumatic.      Nose: Nose normal.      Mouth/Throat:      Mouth: Mucous membranes are moist.      Pharynx: Oropharynx is clear.   Eyes:      Conjunctiva/sclera: Conjunctivae normal.   Cardiovascular:      Rate and Rhythm: Normal rate.      Comments: LUE AVF with +thrill and +bruit   Pulmonary:      Effort: Pulmonary effort is normal.      Breath sounds: Rales present.   Abdominal:      General: There is distension.   Musculoskeletal:      Cervical back: Normal range of motion and neck supple.      Right lower leg: Edema present.      Left lower leg: Edema present.   Skin:     General: Skin is warm and dry.   Neurological:      General: No focal deficit present.      Mental Status: He is alert and oriented to person, place, and time.   Psychiatric:         Mood and Affect: Mood normal.         Behavior: Behavior normal.       Significant Labs:  CBC:   Recent Labs   Lab 03/16/23  2345   WBC 3.54*   RBC 2.99*   HGB 8.7*   HCT 27.9*      MCV 93   MCH 29.1   MCHC 31.2*     CMP:   Recent Labs   Lab 03/16/23  2345   *   CALCIUM 8.5*   ALBUMIN 3.0*   PROT 6.2   *   K 6.0*   CO2 20*   CL 95   BUN 92*   CREATININE 5.7*   ALKPHOS 209*   ALT 26   AST 21   BILITOT 0.7     All labs within the past 24 hours have been reviewed.

## 2023-03-17 NOTE — ASSESSMENT & PLAN NOTE
Hb 8.7 on arrival    -Target Hg of 10-12  -Transfuse for Hg <7.0  -Defer epo at this time 2/2 hypertensive urgency

## 2023-03-17 NOTE — SUBJECTIVE & OBJECTIVE
Past Medical History:   Diagnosis Date    Chronic kidney disease-mineral and bone disorder 10/12/2022    COPD (chronic obstructive pulmonary disease)     Diabetes mellitus type 1     ESRD on dialysis     Hypertension     Hypervolemia 2/22/2023    Hyponatremia 3/4/2023    SOB (shortness of breath) 10/12/2022    Patient with history COPD treated with Ellipta the albuterol, fluticasone-salmeterol tachypneic in the ED saturating 94% and 6 L on nasal cannula, patient does not know how many  he uses it is in nursing home.    -duo nebs Q 4  Given that patient is a poor historian, and in the setting of left lower extremity edema and history of coagulopathy PE cannot be ruled out.  Will workup for possible infec       Past Surgical History:   Procedure Laterality Date    AV FISTULA PLACEMENT         Review of patient's allergies indicates:  No Known Allergies    No current facility-administered medications on file prior to encounter.     Current Outpatient Medications on File Prior to Encounter   Medication Sig    acetaminophen (TYLENOL) 650 MG TbSR Take 650 mg by mouth every 8 (eight) hours as needed (pain or fever over 100.4).    albuterol (PROVENTIL/VENTOLIN HFA) 90 mcg/actuation inhaler Inhale 2 puffs into the lungs 4 (four) times daily as needed (breathing).    apixaban (ELIQUIS) 5 mg Tab Take 5 mg by mouth 2 (two) times daily.    aspirin (ECOTRIN) 81 MG EC tablet Take 81 mg by mouth once daily.    atorvastatin (LIPITOR) 40 MG tablet Take 1 tablet (40 mg total) by mouth once daily. (Patient taking differently: Take 40 mg by mouth every evening.)    carvediloL (COREG) 3.125 MG tablet Take 2 tablets (6.25 mg total) by mouth 2 (two) times daily.    cetirizine (ZYRTEC) 10 MG tablet Take 10 mg by mouth daily as needed (itching).    cloNIDine 0.3 mg/24 hr td ptwk (CATAPRES) 0.3 mg/24 hr Place 1 patch onto the skin every 7 days.    erythromycin (ROMYCIN) ophthalmic ointment Place a 1/2 inch ribbon of ointment into the lower  eyelid three times a day.    FLUoxetine 40 MG capsule Take 40 mg by mouth once daily.    fluticasone-salmeterol diskus inhaler 250-50 mcg Inhale 1 puff into the lungs 2 (two) times daily.    GLUCAGON EMERGENCY KIT, HUMAN, 1 mg injection 1 mg into the muscle as needed if CBG < 70    hydrALAZINE (APRESOLINE) 100 MG tablet Take 1 tablet (100 mg total) by mouth every 8 (eight) hours.    HYDROPHILIC CREAM TOP Apply liberal amount to affected area twice daily for dry skin    insulin detemir U-100 (LEVEMIR FLEXTOUCH) 100 unit/mL (3 mL) SubQ InPn pen Inject 7 Units into the skin 2 (two) times daily.    insulin lispro (HUMALOG U-100 INSULIN) 100 unit/mL injection Inject 5 Units into the skin 3 (three) times daily before meals. (Patient taking differently: Inject 5 Units into the skin 3 (three) times daily before meals. Plus sliding scale)    isosorbide mononitrate (IMDUR) 30 MG 24 hr tablet Take 30 mg by mouth 2 (two) times daily.    melatonin 3 mg TbDL Take 9 mg by mouth nightly as needed (sleep).    NIFEdipine (PROCARDIA-XL) 90 MG (OSM) 24 hr tablet Take 1 tablet (90 mg total) by mouth once daily.    sodium chloride (OCEAN) 0.65 % nasal spray 2 sprays by Nasal route every 4 (four) hours as needed for Congestion.    tiotropium bromide (SPIRIVA RESPIMAT) 2.5 mcg/actuation inhaler Inhale 2 puffs into the lungs Daily.    triamcinolone acetonide 0.025 % Lotn Apply to the affected area twice daily    vitamin renal formula, B-complex-vitamin c-folic acid, (NEPHROCAP) 1 mg Cap Take 1 capsule by mouth once daily.    [] doxycycline (VIBRA-TABS) 100 MG tablet Take 1 tablet (100 mg total) by mouth every 12 (twelve) hours. for 1 day (Patient not taking: Reported on 3/17/2023)    epoetin xavier (PROCRIT) 10,000 unit/mL injection Inject 0.7 mLs (7,000 Units total) into the skin every Tues, Thurs, Sat.    nut.tx.imp.renal fxn,lac-reduc (NEPRO CARB STEADY ORAL) Give 1 carton by mouth with each meal.    [DISCONTINUED]  albuterol-ipratropium (DUO-NEB) 2.5 mg-0.5 mg/3 mL nebulizer solution Take 3 mLs by nebulization every 4 (four) hours while awake. Rescue     Family History       Problem Relation (Age of Onset)    Diabetes Mother          Tobacco Use    Smoking status: Never    Smokeless tobacco: Never   Substance and Sexual Activity    Alcohol use: Not Currently    Drug use: Not on file    Sexual activity: Not Currently     Review of Systems   Constitutional:  Negative for chills and fever.   HENT:  Negative for nosebleeds and sore throat.    Respiratory:  Positive for shortness of breath. Negative for cough.    Cardiovascular:  Negative for chest pain and leg swelling.   Gastrointestinal:  Negative for abdominal pain, diarrhea, nausea and vomiting.   Genitourinary:  Positive for decreased urine volume (chronically oliguric) and penile pain. Negative for dysuria.   Neurological:  Negative for dizziness and light-headedness.   Objective:     Vital Signs (Most Recent):  Temp: 97.4 °F (36.3 °C) (03/17/23 1259)  Pulse: 65 (03/17/23 1417)  Resp: 16 (03/17/23 1417)  BP: (!) 152/83 (03/17/23 1417)  SpO2: 95 % (03/17/23 1417)   Vital Signs (24h Range):  Temp:  [97.4 °F (36.3 °C)-97.9 °F (36.6 °C)] 97.4 °F (36.3 °C)  Pulse:  [57-77] 65  Resp:  [15-30] 16  SpO2:  [84 %-100 %] 95 %  BP: (152-218)/() 152/83     Weight: 70.8 kg (156 lb)  Body mass index is 23.04 kg/m².    Physical Exam  Vitals and nursing note reviewed.   Constitutional:       General: He is not in acute distress.     Appearance: Normal appearance. He is ill-appearing (chronically). He is not diaphoretic.   HENT:      Head: Normocephalic and atraumatic.      Right Ear: External ear normal.      Left Ear: External ear normal.      Nose: Nose normal.      Comments: Nasal cannula in place at 9L  No bleeding from nares     Mouth/Throat:      Mouth: Mucous membranes are moist.   Eyes:      General: No scleral icterus.        Right eye: No discharge.         Left eye: No  discharge.      Extraocular Movements: Extraocular movements intact.      Comments: Left eye with subconjunctival hemorrhage   Cardiovascular:      Rate and Rhythm: Normal rate and regular rhythm.      Heart sounds: Normal heart sounds. No murmur heard.  Pulmonary:      Effort: Pulmonary effort is normal. No respiratory distress.      Breath sounds: Rales present. No wheezing.   Abdominal:      General: Bowel sounds are normal. There is distension.      Palpations: Abdomen is soft. There is no mass.      Tenderness: There is abdominal tenderness (generalized).   Musculoskeletal:         General: No swelling or deformity. Normal range of motion.      Cervical back: Normal range of motion and neck supple.      Right lower leg: No edema.      Left lower leg: No edema.   Skin:     General: Skin is warm.      Coloration: Skin is not jaundiced.      Findings: No bruising.   Neurological:      Mental Status: He is alert and oriented to person, place, and time.   Psychiatric:         Mood and Affect: Mood normal.         Behavior: Behavior normal.         Thought Content: Thought content normal.           Significant Labs: All pertinent labs within the past 24 hours have been reviewed.    Significant Imaging: I have reviewed all pertinent imaging results/findings within the past 24 hours.

## 2023-03-17 NOTE — ASSESSMENT & PLAN NOTE
Outpatient HD Information:    -Outpatient HD unit: [facility]   -HD tx days: MWF   -HD tx time: 4hrs   -Last HD tx prior to presentation: 3/15/23  -HD access: LUE AVF   -HD modality: iHD   -Residual urine: Anuric       Plan/Recommendations:    -S/p HD today for metabolic clearance and volume management. Patient still hypervolemic on exam and requiring oxygen. Plan for additional HD treatment tomorrow for further volume management   -Renal diet, if not NPO   -Strict I/O's and daily weights  -Daily renal function panels   -Renally dose meds

## 2023-03-17 NOTE — PROGRESS NOTES
Received report  from dialysis nurse Jose . Patient stable , B/P 183/95, pulse 65, /. No issues at this time duringn HD bedside treatment .

## 2023-03-17 NOTE — ASSESSMENT & PLAN NOTE
HD MWF with AVF to [facility]  [place] [facility]  [place] urine     - nephrology consulted for emergent HD in the ED, appreciate assistance  - renally dose all medications  - avoid nephrotoxic agents  - strict I&O  - renal diet fluid restricted to 1L  - monitor renal function daily

## 2023-03-17 NOTE — ASSESSMENT & PLAN NOTE
Hx of pulmonary thromboembolism within a lobar branch to the right lower lobe noting additional pulmonary thromboembolism within segmental branches to the right upper lobe in 10/2022     CTA on 3/12/23 negative for acute or chronic PE  BLE DVT US on 3/12/23 negative     - resume home eliquis

## 2023-03-17 NOTE — ASSESSMENT & PLAN NOTE
Patient with Hypoxic Respiratory failure which is Acute on chronic.  he is on home oxygen at 3 LPM. Supplemental oxygen was provided and noted-  .   Signs/symptoms of respiratory failure include- increased work of breathing. Contributing diagnoses includes - Pleural effusion and pulmonary edema 2/2 volume overload and HTN Labs and images were reviewed. Patient Has not had a recent ABG. Will treat underlying causes and adjust management of respiratory failure as follows.    BNP elevated to 4802 and CXR with significant pulmonary edema and pleural effusions  Satting 95% on 6LNC when first arrived to ED  Patient subsequently desatted to 84% and was placed on 15L HFNC with improvement to mid 90's%.   Following HD, patient satting 95% on 4L NC    - increased O2 requirement from baseline, wean down O2 as tolerated  - hold antibiotics as patient just completed a course of CAP coverage  - nephrology consulted for emergent HD in the ED, appreciate assitance

## 2023-03-17 NOTE — ASSESSMENT & PLAN NOTE
Hyperkalemic to 6 on arrival without EKG changes  Nephrology consulted for emergent HD in the ED  Medically managed with lasix, insulin, and lokelma    - f/u pm BMP  - monitor renal function daily  - appreciate nephrology assistance with further HD needs

## 2023-03-17 NOTE — ASSESSMENT & PLAN NOTE
Nephrology consulted for ESRD on HD, appreciate assistance  -Low phos diet   -No indication for phos binders at this time   -Trend phos levels with daily labs

## 2023-03-17 NOTE — PHARMACY MED REC
"Admission Medication History     The home medication history was taken by Rupa Lindsay.    You may go to "Admission" then "Reconcile Home Medications" tabs to review and/or act upon these items.     The home medication list has been updated by the Pharmacy department.   Please read ALL comments highlighted in yellow.   Please address this information as you see fit.    Feel free to contact us if you have any questions or require assistance.      The medications listed below were removed from the home medication list. Please reorder if appropriate:  Patient reports no longer taking the following medication(s):  ALBUTEROL-IPRATROPIUM NEB SOL      Medications listed below were obtained from: Nursing home  Current Outpatient Medications on File Prior to Encounter   Medication Sig    acetaminophen (TYLENOL) 650 MG TbSR     Take 650 mg by mouth every 8 (eight) hours as needed (pain or fever over 100.4).    albuterol (PROVENTIL/VENTOLIN HFA) 90 mcg/actuation inhaler   Inhale 2 puffs into the lungs 4 (four) times daily as needed (breathing).    apixaban (ELIQUIS) 5 mg Tab   Take 5 mg by mouth 2 (two) times daily.    aspirin (ECOTRIN) 81 MG EC tablet   Take 81 mg by mouth once daily.    atorvastatin (LIPITOR) 40 MG tablet   Take 40 mg by mouth every evening.    carvediloL (COREG) 3.125 MG tablet   Take 2 tablets (6.25 mg total) by mouth 2 (two) times daily.    cetirizine (ZYRTEC) 10 MG tablet   Take 10 mg by mouth daily as needed (itching).    cloNIDine 0.3 mg/24 hr td ptwk (CATAPRES) 0.3 mg/24 hr   Place 1 patch onto the skin every 7 days.    erythromycin (ROMYCIN) ophthalmic ointment   Place a 1/2 inch ribbon of ointment into the lower eyelid three times a day.    FLUoxetine 40 MG capsule   Take 40 mg by mouth once daily.    fluticasone-salmeterol diskus inhaler 250-50 mcg   Inhale 1 puff into the lungs 2 (two) times daily.    GLUCAGON EMERGENCY KIT, HUMAN, 1 mg injection   1 mg into the muscle as needed if CBG < 70    " hydrALAZINE (APRESOLINE) 100 MG tablet   Take 1 tablet (100 mg total) by mouth every 8 (eight) hours.    HYDROPHILIC CREAM TOP     Apply liberal amount to affected area twice daily for dry skin    insulin detemir U-100 (LEVEMIR FLEXTOUCH) 100 unit/mL (3 mL) SubQ InPn pen   Inject 7 Units into the skin 2 (two) times daily.    insulin lispro (HUMALOG U-100 INSULIN) 100 unit/mL injection   Inject 5 Units into the skin 3 (three) times daily before meals. Plus sliding scale)    isosorbide mononitrate (IMDUR) 30 MG 24 hr tablet   Take 30 mg by mouth 2 (two) times daily.    melatonin 3 mg TbDL     Take 9 mg by mouth nightly as needed (sleep).    NIFEdipine (PROCARDIA-XL) 90 MG (OSM) 24 hr tablet   Take 1 tablet (90 mg total) by mouth once daily.    sodium chloride (OCEAN) 0.65 % nasal spray   2 sprays by Nasal route every 4 (four) hours as needed for Congestion.    tiotropium bromide (SPIRIVA RESPIMAT) 2.5 mcg/actuation inhaler   Inhale 2 puffs into the lungs Daily.    triamcinolone acetonide 0.025 % Lotn   Apply to the affected area twice daily    vitamin renal formula, B-complex-vitamin c-folic acid, (NEPHROCAP) 1 mg Cap   Take 1 capsule by mouth once daily.    [] doxycycline (VIBRA-TABS) 100 MG tablet Take 1 tablet (100 mg total) by mouth every 12 (twelve) hours. for 1 day (Patient not taking. Not listed on NH MAR 3/17/2023)    epoetin xavier (PROCRIT) 10,000 unit/mL injection   Inject 0.7 mLs (7,000 Units total) into the skin every Tues, Thurs, Sat.    nut.tx.imp.renal fxn,lac-reduc (NEPRO CARB STEADY ORAL)   Give 1 carton by mouth with each meal.               Potential issues to be addressed PRIOR TO DISCHARGE  Patient reported not taking the following medications: (DOXYCYCLINE 100 MG). These medications remain on the home medication list. Please address accordingly.     The listed medications were obtained from another facility ([facility] The patient may not have been able to fill these  prescriptions prior to this admission and may require new scripts upon discharge.     Rupa Lindsay  EXT [id number]                  .

## 2023-03-17 NOTE — ASSESSMENT & PLAN NOTE
Patient arrived hypertensive at 191/86, highest while in ED at 214/102  Received po carvedilol, clonidine patch, iv hydralazine 10mg x 3, IMDUR, nifedipine  Nephrology consulted for emergent HD in the ED    - continue home anti-hypertensives    - Nifedipine to 90 mg qD              - Carvedilol 6.25 mg BID              - Clonidine patch, replaced today              - Imdur 30 mg BID              - Hydralazine 100 mg TID  - Will continue to monitor vital signs.   - Will aim for controlled BP reduction by medications noted above.   - Monitor and mitigate end organ damage as indicated.

## 2023-03-17 NOTE — PROGRESS NOTES
Patient with history of esrd on HD. Presented with HTN and volume overload on 15L O2. Patient also with K of 6. Per Ed given lasix, insulin d50, lokelma. In setting of hyper K and volume overload will perform urgent dialysis. Orders placed and HD nurse notified. Full consult to follow.

## 2023-03-17 NOTE — ED PROVIDER NOTES
Encounter Date: 3/16/2023       History     Chief Complaint   Patient presents with    Epistaxis     Pt from [facility] for epistaxis intermittently since yesterday, +blood thinners eliquis, -headaches     Patient is a 57 year old male with a PMHx of hypertension, type 1 diabetes, COPD, HFpEF on 3 L home O2 and ESRD on hemodialysis (M, W, F) presenting to the ED for complaints of epistaxis since yesterday. The patient has no complaints at this time except that he is hungry. He does endorse shortness of breath without chest pain. States he has been SOB for some time, but is unable to elaborate on a specific timeline. He states he did have some relief after his last dialysis session yesterday (received full session). Denies chest pain or fevers. Further history limited as patient is a poor historian and his relative uncooperativity.    Review of patient's allergies indicates:  No Known Allergies  Past Medical History:   Diagnosis Date    Chronic kidney disease-mineral and bone disorder 10/12/2022    COPD (chronic obstructive pulmonary disease)     Diabetes mellitus type 1     ESRD on dialysis     Hypertension     Hypervolemia 2/22/2023    Hyponatremia 3/4/2023    SOB (shortness of breath) 10/12/2022    Patient with history COPD treated with Ellipta the albuterol, fluticasone-salmeterol tachypneic in the ED saturating 94% and 6 L on nasal cannula, patient does not know how many  he uses it is in nursing home.    -duo nebs Q 4  Given that patient is a poor historian, and in the setting of left lower extremity edema and history of coagulopathy PE cannot be ruled out.  Will workup for possible infec     Past Surgical History:   Procedure Laterality Date    AV FISTULA PLACEMENT       Family History   Problem Relation Age of Onset    Diabetes Mother      Social History     Tobacco Use    Smoking status: Never    Smokeless tobacco: Never   Substance Use Topics    Alcohol use: Not Currently     Review of Systems    Physical  Exam     Initial Vitals [03/16/23 2222]   BP Pulse Resp Temp SpO2   (!) 191/86 73 16 97.9 °F (36.6 °C) 95 %      MAP       --         Physical Exam    Nursing note and vitals reviewed.  Constitutional: He appears well-developed and well-nourished. He is not diaphoretic. No distress.   Chronically ill-appearing. Speaking full sentences. No acute distress.   HENT:   Head: Normocephalic and atraumatic.   Right Ear: External ear normal.   Left Ear: External ear normal.   Intermittent oozing from left nares. No blood in posterior oropharynx.   Neck: Neck supple.   Cardiovascular:  Normal rate, regular rhythm, normal heart sounds and intact distal pulses.           Pulmonary/Chest: No respiratory distress. He has no wheezes. He has no rhonchi. He has rales.   Bibasilar rales.   Abdominal: Abdomen is soft. He exhibits no distension. There is no abdominal tenderness. There is no rebound and no guarding.   Musculoskeletal:      Cervical back: Neck supple.     Neurological: He is alert and oriented to person, place, and time. GCS score is 15. GCS eye subscore is 4. GCS verbal subscore is 5. GCS motor subscore is 6.   Skin: Skin is warm. Capillary refill takes less than 2 seconds. No rash noted.   Psychiatric: He has a normal mood and affect.       ED Course   Procedures  Labs Reviewed   CBC W/ AUTO DIFFERENTIAL - Abnormal; Notable for the following components:       Result Value    WBC 3.54 (*)     RBC 2.99 (*)     Hemoglobin 8.7 (*)     Hematocrit 27.9 (*)     MCHC 31.2 (*)     RDW 14.8 (*)     Lymph # 0.5 (*)     Gran % 73.7 (*)     Lymph % 14.7 (*)     All other components within normal limits   COMPREHENSIVE METABOLIC PANEL - Abnormal; Notable for the following components:    Sodium 129 (*)     Potassium 6.0 (*)     CO2 20 (*)     Glucose 462 (*)     BUN 92 (*)     Creatinine 5.7 (*)     Calcium 8.5 (*)     Albumin 3.0 (*)     Alkaline Phosphatase 209 (*)     eGFR 10.7 (*)     All other components within normal limits    B-TYPE NATRIURETIC PEPTIDE - Abnormal; Notable for the following components:    BNP 4,802 (*)     All other components within normal limits   TROPONIN I - Abnormal; Notable for the following components:    Troponin I 0.033 (*)     All other components within normal limits   PROCALCITONIN - Abnormal; Notable for the following components:    Procalcitonin 1.06 (*)     All other components within normal limits   TYPE & SCREEN   POCT GLUCOSE MONITORING CONTINUOUS     EKG Readings: (Independently Interpreted)   Initial Reading: No STEMI. Previous EKG: Compared with most recent EKG Previous EKG Date: 03/12/2023. Rhythm: Normal Sinus Rhythm. Heart Rate: 75. Ectopy: No Ectopy. Conduction: 1st Degree AV Block.     Imaging Results              X-Ray Chest AP Portable (Final result)  Result time 03/17/23 00:10:33      Final result by Hal Duenas MD (03/17/23 00:10:33)                   Impression:      Persistent bilateral airspace opacities most compatible with pulmonary edema.    Probable pleural effusions left greater than right.    Correlate for pneumonia.      Electronically signed by: Hal Duenas  Date:    03/17/2023  Time:    00:10               Narrative:    EXAMINATION:  XR CHEST AP PORTABLE    CLINICAL HISTORY:  Hypoxemia    TECHNIQUE:  Single frontal view of the chest was performed.    COMPARISON:  03/12/2023    FINDINGS:  The cardiac silhouette remains enlarged.  Bilateral airspace infiltrates remain.  Surgical clips throughout the brachial region of the left upper extremity are noted.                                    X-Rays:   Independently Interpreted Readings:   Chest X-Ray: Cardiomegaly present.  Increased vascular markings consistent with CHF are present.   Medications   cloNIDine 0.2 mg/24 hr td ptwk 1 patch (1 patch Transdermal Patch Applied 3/17/23 6545)   hydrALAZINE injection 10 mg (0 mg Intravenous Hold 3/17/23 3934)   mupirocin 2 % ointment (has no administration in time range)   sodium  chloride 0.9% flush 10 mL (has no administration in time range)   melatonin tablet 6 mg (has no administration in time range)   sodium chloride 0.9% flush 10 mL (has no administration in time range)   naloxone 0.4 mg/mL injection 0.02 mg (has no administration in time range)   glucagon (human recombinant) injection 1 mg (has no administration in time range)   insulin aspart U-100 pen 0-5 Units (has no administration in time range)   apixaban tablet 5 mg (has no administration in time range)   aspirin EC tablet 81 mg (has no administration in time range)   atorvastatin tablet 40 mg (has no administration in time range)   albuterol-ipratropium 2.5 mg-0.5 mg/3 mL nebulizer solution 3 mL (has no administration in time range)   carvediloL tablet 6.25 mg (has no administration in time range)   FLUoxetine capsule 40 mg (has no administration in time range)   hydrALAZINE tablet 100 mg (has no administration in time range)   isosorbide mononitrate 24 hr tablet 30 mg (has no administration in time range)   NIFEdipine 24 hr tablet 90 mg (has no administration in time range)   vitamin renal formula (B-complex-vitamin c-folic acid) 1 mg per capsule 1 capsule (has no administration in time range)   tiotropium bromide 2.5 mcg/actuation inhaler 2 puff (has no administration in time range)   dextrose 10% bolus 125 mL 125 mL (has no administration in time range)   dextrose 10% bolus 250 mL 250 mL (has no administration in time range)   dextrose 40 % gel 15,000 mg (has no administration in time range)   dextrose 40 % gel 30,000 mg (has no administration in time range)   hydrALAZINE injection 10 mg (10 mg Intravenous Given 3/17/23 0105)   insulin regular injection 5 Units 0.05 mL (5 Units Intravenous Given 3/17/23 0104)   furosemide injection 80 mg (80 mg Intravenous Given 3/17/23 0105)   sodium zirconium cyclosilicate packet 5 g (5 g Oral Given 3/17/23 0155)   nitroGLYCERIN 2% TD oint ointment 1 inch (1 inch Transdermal Given 3/17/23  0155)   hydrALAZINE injection 10 mg (10 mg Intravenous Given 3/17/23 0246)   vancomycin (VANCOCIN) 1,000 mg in dextrose 5 % (D5W) 250 mL IVPB (Vial-Mate) (1,000 mg Intravenous New Bag 3/17/23 0538)   ceFEPIme (MAXIPIME) 1 g in dextrose 5 % in water (D5W) 5 % 50 mL IVPB (MB+) (0 g Intravenous Stopped 3/17/23 0539)   carvediloL tablet 3.125 mg (3.125 mg Oral Given 3/17/23 0335)   isosorbide mononitrate 24 hr tablet 30 mg (30 mg Oral Given 3/17/23 0335)   NIFEdipine 24 hr tablet 30 mg (30 mg Oral Given 3/17/23 0336)   hydrALAZINE injection 10 mg (10 mg Intravenous Given 3/17/23 0455)   labetalol 20 mg/4 mL (5 mg/mL) IV syring (10 mg Intravenous Given 3/17/23 0519)     Medical Decision Making:   History:   I obtained history from: someone other than patient.  Old Records Summarized: records from previous admission(s).  Initial Assessment:   Emergent evaluation of epistaxis.  He is afebrile and hemodynamically stable, however is hypoxic to 80% on his home O2 requirement  Differential Diagnosis:   CHF exacerbation vs noncardiogenic pulmonary edema, hypertensive emergency, bacterial pneumonia, ACS, electrolyte abnormality  Clinical Tests:   Lab Tests: Ordered and Reviewed  Radiological Study: Ordered and Reviewed  Medical Tests: Ordered and Reviewed  ED Management:  Patient is evidently volume overloaded on exam with consistent findings of edema on chest x-ray, with a BNP over 4000. Placed on 10 L NC/bubble flow.  He does still make urine.  Further labs remarkable for hyperkalemia of 6 (without EKG changes) and hyperglycemia over 400 without evidence of DKA.  Chest x-ray did also comment on possible pneumonia with an elevated procal; covered with broad spectrum abx (vanc/cefepime) given that he had a recent hospital admission.  Given Lasix, Lokelma and insulin.  Consult for Nephrology placed; plan for initiation of hemodialysis in the ED for better blood pressure control prior to medicine adamant.  Discussed with HL, who  will admit to their service.          Attending Attestation:   Physician Attestation Statement for Resident:  As the supervising MD   Physician Attestation Statement: I have personally seen and examined this patient.   I agree with the above history.  -:   As the supervising MD I agree with the above PE.     As the supervising MD I agree with the above treatment, course, plan, and disposition.    I have reviewed and agree with the residents interpretation of the following: lab data, x-rays and EKG.  I have reviewed the following: old records at this facility.                           Clinical Impression:   Final diagnoses:  [R04.0] Epistaxis  [R09.02] Hypoxia  [J81.1] Pulmonary edema (Primary)  [E87.5] Hyperkalemia        ED Disposition Condition    Admit Stable                Tristan Caputo MD  Resident  03/17/23 0709       Tere Cowan MD  03/23/23 0334

## 2023-03-17 NOTE — ED NOTES
Rec'd pt to EDOU 10 at this time.    I have assumed care of the pt from ADELITA Infante; two pt identifiers verbally confirmed w/ pt; pt is Cosme Lewis, : 1965    Pt awake and alert, oriented x4; resting on stretcher.  VS stable; Pt denies pain at this time; no acute distress or discomfort reported or observed.   Able to reposition self on stretcher. Bed locked in lowest position; side rails up and locked x 2; call light, bedside table, and personal belongings within reach. Room assessed for safety measures and cleanliness; no action needed at this time. Pt denies needs or complaints at this time; will continue to monitor.

## 2023-03-17 NOTE — ED TRIAGE NOTES
Cosme Lewis, a 57 y.o. male presents to the ED w/ complaint of nose bleed x 3 days. Pt from [facility]. On eliquis, last dialysis Wednesday.     Triage note:  Chief Complaint   Patient presents with    Epistaxis     Pt from [facility] for epistaxis intermittently since yesterday, +blood thinners eliquis, -headaches     Review of patient's allergies indicates:  No Known Allergies  Past Medical History:   Diagnosis Date    Chronic kidney disease-mineral and bone disorder 10/12/2022    COPD (chronic obstructive pulmonary disease)     Diabetes mellitus type 1     ESRD on dialysis     Hypertension     Hypervolemia 2/22/2023    Hyponatremia 3/4/2023    SOB (shortness of breath) 10/12/2022    Patient with history COPD treated with Ellipta the albuterol, fluticasone-salmeterol tachypneic in the ED saturating 94% and 6 L on nasal cannula, patient does not know how many  he uses it is in nursing home.    -duo nebs Q 4  Given that patient is a poor historian, and in the setting of left lower extremity edema and history of coagulopathy PE cannot be ruled out.  Will workup for possible infec

## 2023-03-17 NOTE — PROGRESS NOTES
HD completed , blood returned and needles pulled , post bleeding time < 5 minutes.  Post B/P 214/101 , pulse 66, resp 14 on nasal cannula 4 liters with o2 sat 95%. Net uf removed 3.9 liters. Patient awake and alert.

## 2023-03-17 NOTE — ASSESSMENT & PLAN NOTE
-Low phos diet   -No indication for phos binders at this time   -Trend phos levels with daily labs

## 2023-03-17 NOTE — H&P
Nick Menjivar - Emergency Dept  [facility] Medicine  History & Physical    Patient Name: Cosme Lewis  MRN: [id number]  Patient Class: IP- Inpatient  Admission Date: 3/16/2023  Attending Physician: Robbie Smith MD   Primary Care Provider: Primary Doctor No         Patient information was obtained from patient and ER records.     Subjective:     Principal Problem:Acute on chronic respiratory failure with hypoxia    Chief Complaint:   Chief Complaint   Patient presents with    Epistaxis     Pt from [facility] for epistaxis intermittently since yesterday, +blood thinners eliquis, -headaches        HPI: Cosme Lewis is a 57 y.o. with ESRD on HD MWF, DM1, renovascular HTN, Chronic respiratory failure (on 3L of home O2), HFpEF, and malnutrition who presents to the ED for shortness of breath and epistaxis. He denies any nose bleeds on our encounter. He does endorse difficulty breathing but denies chest pain. He reports a penile injury 2/2 a condom catheter and has pain from the area. Denies fever, chills, diarrhea, cough, and dysuria. He still produces urine and his last complete session of HD was on 3/14 while admitted for volume overload from a missed HD session.       Of note, patient recently hospitalized at [facility] 3/10-3/15 for volume overload 2/2 missed HD session with periorbital edema, acute on chronic hypoxic respiratory failure, and hospital course that was complicated by bleeding AVF. He received a course of CAP coverage with CTX and doxycycline due to c/f an aspiration event. He was dc'd back to [facility] on his home O2.     In the ED, patient arrived hypertensive to 191/86 and satting 95% on 6LNC. Patient subsequently desatted to 84% and was placed on 15L HFNC with improvement to mid 90's%. Labs remarkable for hyperkalemia to 6, bicarb of 20, glucose 462, BNP 4802, and troponin 0.033. CXR with significant evidence for pulmonary edema and pleural effusions L>R. EKG without hyperK changes. Nephrology  was consulted in the ED for emergent HD. He was medically shifted with lasix, insulin, and lokelma. He was started on vanc and cefepime for CAP coverage.      Patient admitted to hospital medicine.      Past Medical History:   Diagnosis Date    Chronic kidney disease-mineral and bone disorder 10/12/2022    COPD (chronic obstructive pulmonary disease)     Diabetes mellitus type 1     ESRD on dialysis     Hypertension     Hypervolemia 2/22/2023    Hyponatremia 3/4/2023    SOB (shortness of breath) 10/12/2022    Patient with history COPD treated with Ellipta the albuterol, fluticasone-salmeterol tachypneic in the ED saturating 94% and 6 L on nasal cannula, patient does not know how many  he uses it is in nursing home.    -duo nebs Q 4  Given that patient is a poor historian, and in the setting of left lower extremity edema and history of coagulopathy PE cannot be ruled out.  Will workup for possible infec       Past Surgical History:   Procedure Laterality Date    AV FISTULA PLACEMENT         Review of patient's allergies indicates:  No Known Allergies    No current facility-administered medications on file prior to encounter.     Current Outpatient Medications on File Prior to Encounter   Medication Sig    acetaminophen (TYLENOL) 650 MG TbSR Take 650 mg by mouth every 8 (eight) hours as needed (pain or fever over 100.4).    albuterol (PROVENTIL/VENTOLIN HFA) 90 mcg/actuation inhaler Inhale 2 puffs into the lungs 4 (four) times daily as needed (breathing).    apixaban (ELIQUIS) 5 mg Tab Take 5 mg by mouth 2 (two) times daily.    aspirin (ECOTRIN) 81 MG EC tablet Take 81 mg by mouth once daily.    atorvastatin (LIPITOR) 40 MG tablet Take 1 tablet (40 mg total) by mouth once daily. (Patient taking differently: Take 40 mg by mouth every evening.)    carvediloL (COREG) 3.125 MG tablet Take 2 tablets (6.25 mg total) by mouth 2 (two) times daily.    cetirizine (ZYRTEC) 10 MG tablet Take 10 mg by mouth daily as needed  (itching).    cloNIDine 0.3 mg/24 hr td ptwk (CATAPRES) 0.3 mg/24 hr Place 1 patch onto the skin every 7 days.    erythromycin (ROMYCIN) ophthalmic ointment Place a 1/2 inch ribbon of ointment into the lower eyelid three times a day.    FLUoxetine 40 MG capsule Take 40 mg by mouth once daily.    fluticasone-salmeterol diskus inhaler 250-50 mcg Inhale 1 puff into the lungs 2 (two) times daily.    GLUCAGON EMERGENCY KIT, HUMAN, 1 mg injection 1 mg into the muscle as needed if CBG < 70    hydrALAZINE (APRESOLINE) 100 MG tablet Take 1 tablet (100 mg total) by mouth every 8 (eight) hours.    HYDROPHILIC CREAM TOP Apply liberal amount to affected area twice daily for dry skin    insulin detemir U-100 (LEVEMIR FLEXTOUCH) 100 unit/mL (3 mL) SubQ InPn pen Inject 7 Units into the skin 2 (two) times daily.    insulin lispro (HUMALOG U-100 INSULIN) 100 unit/mL injection Inject 5 Units into the skin 3 (three) times daily before meals. (Patient taking differently: Inject 5 Units into the skin 3 (three) times daily before meals. Plus sliding scale)    isosorbide mononitrate (IMDUR) 30 MG 24 hr tablet Take 30 mg by mouth 2 (two) times daily.    melatonin 3 mg TbDL Take 9 mg by mouth nightly as needed (sleep).    NIFEdipine (PROCARDIA-XL) 90 MG (OSM) 24 hr tablet Take 1 tablet (90 mg total) by mouth once daily.    sodium chloride (OCEAN) 0.65 % nasal spray 2 sprays by Nasal route every 4 (four) hours as needed for Congestion.    tiotropium bromide (SPIRIVA RESPIMAT) 2.5 mcg/actuation inhaler Inhale 2 puffs into the lungs Daily.    triamcinolone acetonide 0.025 % Lotn Apply to the affected area twice daily    vitamin renal formula, B-complex-vitamin c-folic acid, (NEPHROCAP) 1 mg Cap Take 1 capsule by mouth once daily.    [] doxycycline (VIBRA-TABS) 100 MG tablet Take 1 tablet (100 mg total) by mouth every 12 (twelve) hours. for 1 day (Patient not taking: Reported on 3/17/2023)    epoetin xavier (PROCRIT) 10,000 unit/mL  injection Inject 0.7 mLs (7,000 Units total) into the skin every Tues, Thurs, Sat.    nut.tx.imp.renal fxn,lac-reduc (NEPRO CARB STEADY ORAL) Give 1 carton by mouth with each meal.    [DISCONTINUED] albuterol-ipratropium (DUO-NEB) 2.5 mg-0.5 mg/3 mL nebulizer solution Take 3 mLs by nebulization every 4 (four) hours while awake. Rescue     Family History       Problem Relation (Age of Onset)    Diabetes Mother          Tobacco Use    Smoking status: Never    Smokeless tobacco: Never   Substance and Sexual Activity    Alcohol use: Not Currently    Drug use: Not on file    Sexual activity: Not Currently     Review of Systems   Constitutional:  Negative for chills and fever.   HENT:  Negative for nosebleeds and sore throat.    Respiratory:  Positive for shortness of breath. Negative for cough.    Cardiovascular:  Negative for chest pain and leg swelling.   Gastrointestinal:  Negative for abdominal pain, diarrhea, nausea and vomiting.   Genitourinary:  Positive for decreased urine volume (chronically oliguric) and penile pain. Negative for dysuria.   Neurological:  Negative for dizziness and light-headedness.   Objective:     Vital Signs (Most Recent):  Temp: 97.4 °F (36.3 °C) (03/17/23 1259)  Pulse: 65 (03/17/23 1417)  Resp: 16 (03/17/23 1417)  BP: (!) 152/83 (03/17/23 1417)  SpO2: 95 % (03/17/23 1417)   Vital Signs (24h Range):  Temp:  [97.4 °F (36.3 °C)-97.9 °F (36.6 °C)] 97.4 °F (36.3 °C)  Pulse:  [57-77] 65  Resp:  [15-30] 16  SpO2:  [84 %-100 %] 95 %  BP: (152-218)/() 152/83     Weight: 70.8 kg (156 lb)  Body mass index is 23.04 kg/m².    Physical Exam  Vitals and nursing note reviewed.   Constitutional:       General: He is not in acute distress.     Appearance: Normal appearance. He is ill-appearing (chronically). He is not diaphoretic.   HENT:      Head: Normocephalic and atraumatic.      Right Ear: External ear normal.      Left Ear: External ear normal.      Nose: Nose normal.      Comments: Nasal  cannula in place at 9L  No bleeding from nares     Mouth/Throat:      Mouth: Mucous membranes are moist.   Eyes:      General: No scleral icterus.        Right eye: No discharge.         Left eye: No discharge.      Extraocular Movements: Extraocular movements intact.      Comments: Left eye with subconjunctival hemorrhage   Cardiovascular:      Rate and Rhythm: Normal rate and regular rhythm.      Heart sounds: Normal heart sounds. No murmur heard.  Pulmonary:      Effort: Pulmonary effort is normal. No respiratory distress.      Breath sounds: Rales present. No wheezing.   Abdominal:      General: Bowel sounds are normal. There is distension.      Palpations: Abdomen is soft. There is no mass.      Tenderness: There is abdominal tenderness (generalized).   Genitourinary: chaperone present. Penile skin breakdown present.    Musculoskeletal:         General: No swelling or deformity. Normal range of motion.      Cervical back: Normal range of motion and neck supple.      Right lower leg: No edema.      Left lower leg: No edema.   Skin:     General: Skin is warm.      Coloration: Skin is not jaundiced.      Findings: No bruising.   Neurological:      Mental Status: He is alert and oriented to person, place, and time.   Psychiatric:         Mood and Affect: Mood normal.         Behavior: Behavior normal.         Thought Content: Thought content normal.           Significant Labs: All pertinent labs within the past 24 hours have been reviewed.    Significant Imaging: I have reviewed all pertinent imaging results/findings within the past 24 hours.    Assessment/Plan:     * Acute on chronic respiratory failure with hypoxia  Patient with Hypoxic Respiratory failure which is Acute on chronic.  he is on home oxygen at 3 LPM. Supplemental oxygen was provided and noted-  .   Signs/symptoms of respiratory failure include- increased work of breathing. Contributing diagnoses includes - Pleural effusion and pulmonary edema 2/2  volume overload and HTN Labs and images were reviewed. Patient Has not had a recent ABG. Will treat underlying causes and adjust management of respiratory failure as follows.    BNP elevated to 4802 and CXR with significant pulmonary edema and pleural effusions  Satting 95% on 6LNC when first arrived to ED  Patient subsequently desatted to 84% and was placed on 15L HFNC with improvement to mid 90's%.   Following HD, patient satting 95% on 4L NC    - increased O2 requirement from baseline, wean down O2 as tolerated  - hold antibiotics as patient just completed a course of CAP coverage  - nephrology consulted for emergent HD in the ED, appreciate assitance    Penis abrasion, initial encounter  Patient presents with skin break down to penis 2/2 condom catheter use.    - barrier ointment BID  - wound care consulted, appreciate assitance      Hyperkalemia  Hyperkalemic to 6 on arrival without EKG changes  Nephrology consulted for emergent HD in the ED  Medically managed with lasix, insulin, and lokelma    - f/u pm BMP  - monitor renal function daily  - appreciate nephrology assistance with further HD needs    Type 2 diabetes mellitus with hyperglycemia, with long-term current use of insulin  Patient's FSGs are uncontrolled due to hyperglycemia on current medication regimen.  Last A1c reviewed-   Lab Results   Component Value Date    HGBA1C 9.5 (H) 03/06/2023     Most recent fingerstick glucose reviewed-   Recent Labs   Lab 03/17/23  0913 03/17/23  1443   POCTGLUCOSE 228* 344*     Current correctional scale  Low  Maintain anti-hyperglycemic dose as follows-   Antihyperglycemics (From admission, onward)      Start     Stop Route Frequency Ordered    03/17/23 1145  insulin aspart U-100 pen 4 Units         -- SubQ 3 times daily with meals 03/17/23 1039    03/17/23 1145  insulin detemir U-100 pen 5 Units         -- SubQ 2 times daily 03/17/23 1040    03/17/23 0730  insulin aspart U-100 pen 0-5 Units         -- SubQ Before  meals & nightly PRN 03/17/23 0632          Hold Oral hypoglycemics while patient is in the hospital.    Hypertensive urgency  Patient arrived hypertensive at 191/86, highest while in ED at 214/102  Received po carvedilol, clonidine patch, iv hydralazine 10mg x 3, IMDUR, nifedipine  Nephrology consulted for emergent HD in the ED    - continue home anti-hypertensives    - Nifedipine to 90 mg qD              - Carvedilol 6.25 mg BID              - Clonidine patch, replaced today              - Imdur 30 mg BID              - Hydralazine 100 mg TID  - Will continue to monitor vital signs.   - Will aim for controlled BP reduction by medications noted above.   - Monitor and mitigate end organ damage as indicated.    HLD (hyperlipidemia)  Continue home statin      Renovascular hypertension  See hypertensive urgency      Anemia in ESRD (end-stage renal disease)  Hb 8.7 on arrival    -Target Hg of 10-12  -Transfuse for Hg <7.0  -Defer epo at this time 2/2 hypertensive urgency       Multiple subsegmental pulmonary emboli without acute cor pulmonale  Hx of pulmonary thromboembolism within a lobar branch to the right lower lobe noting additional pulmonary thromboembolism within segmental branches to the right upper lobe in 10/2022     CTA on 3/12/23 negative for acute or chronic PE  BLE DVT US on 3/12/23 negative     - resume home eliquis      Chronic kidney disease-mineral and bone disorder  Nephrology consulted for ESRD on HD, appreciate assistance  -Low phos diet   -No indication for phos binders at this time   -Trend phos levels with daily labs       ESRD (end stage renal disease)  HD MWF with AVF to POP  Patient last HD session on 3/14 while [place] [facility]  Still produces urine     - nephrology consulted for emergent HD in the ED, appreciate assistance  - renally dose all medications  - avoid nephrotoxic agents  - strict I&O  - renal diet fluid restricted to 1L  - monitor renal function daily      VTE Risk Mitigation (From  admission, onward)           Ordered     apixaban tablet 5 mg  2 times daily         03/17/23 0638     IP VTE HIGH RISK PATIENT  Once         03/17/23 0638     Place sequential compression device  Until discontinued         03/17/23 0638     Place sequential compression device  Until discontinued         03/17/23 0632     Reason for No Pharmacological VTE Prophylaxis  Once        Question:  Reasons:  Answer:  Already adequately anticoagulated on oral Anticoagulants    03/17/23 0632                       On 03/17/2023, patient should be placed in hospital observation services under my care.        Cecilia Dorman MD  Department of Hospital Medicine  [facility] - Emergency Dept

## 2023-03-17 NOTE — ASSESSMENT & PLAN NOTE
Patient's FSGs are uncontrolled due to hyperglycemia on current medication regimen.  Last A1c reviewed-   Lab Results   Component Value Date    HGBA1C 9.5 (H) 03/06/2023     Most recent fingerstick glucose reviewed-   Recent Labs   Lab 03/17/23  0913 03/17/23  1443   POCTGLUCOSE 228* 344*     Current correctional scale  Low  Maintain anti-hyperglycemic dose as follows-   Antihyperglycemics (From admission, onward)    Start     Stop Route Frequency Ordered    03/17/23 1145  insulin aspart U-100 pen 4 Units         -- SubQ 3 times daily with meals 03/17/23 1039    03/17/23 1145  insulin detemir U-100 pen 5 Units         -- SubQ 2 times daily 03/17/23 1040    03/17/23 0730  insulin aspart U-100 pen 0-5 Units         -- SubQ Before meals & nightly PRN 03/17/23 0632        Hold Oral hypoglycemics while patient is in the hospital.

## 2023-03-17 NOTE — ED NOTES
Patient identifiers verified and correct for marcus buck  LOC: The patient is awake, alert and oriented x 3. Appropriate affect; answers questions appropriately  APPEARANCE: Patient appears comfortable and in no acute distress, patient is clean and well groomed.  SKIN: The skin is warm and dry, color consistent with ethnicity, patient has normal skin turgor and moist mucus membranes, skin intact, pt denies breakdown or bruising  MUSCULOSKELETAL: Patient moving all extremities spontaneously, no edema noted.   RESPIRATORY: Airway is open and patent, respirations are spontaneous, patient has a normal effort and rate, no accessory muscle use noted, pt placed on continuous pulse ox with O2 sats noted at 96% on 10L HFNC  CARDIAC: pt remains on cardiac monitor. Denies chest pain. NSR noted on monitor  GASTRO: soft and non-tender to palpation. Denies n/v/d/abd pain  : Pt denies any pain or frequency with urination.  NEURO: Pt opens eyes spontaneously, behavior appropriate to situation, follows commands, facial expression symmetrical, bilateral hand grasp equal and even, purposeful motor response noted,clear speech noted.

## 2023-03-17 NOTE — ED NOTES
Pt picking at nose, removing NC. Instructed not to scratch nose as it induces bleeding, v/u of all. Placed in new clean gown, linens changed, repositioned in bed

## 2023-03-17 NOTE — HPI
Cosme Lewis is a 57 y.o. with ESRD on HD MWF, DM1, renovascular HTN, Chronic respiratory failure (on 3L of home O2), HFpEF, and malnutrition who presents to the ED for shortness of breath and epistaxis. He denies any nose bleeds on our encounter. He does endorse difficulty breathing but denies chest pain. He reports a penile injury 2/2 a condom catheter and has pain from the area. Denies fever, chills, diarrhea, cough, and dysuria. He still produces urine and his last complete session of HD was on 3/14 while admitted for volume overload from a missed HD session.       Of note, [facility] for volume overload 2/2 missed HD session with periorbital edema, acute on chronic hypoxic respiratory failure, and hospital course that was complicated by bleeding AVF. He received a course of CAP coverage with CTX and doxycycline due to c/f an aspiration event. He was dc'd back to [facility] on his home O2.     In the ED, patient arrived hypertensive to 191/86 and satting 95% on 6LNC. Patient subsequently desatted to 84% and was placed on 15L HFNC with improvement to mid 90's%. Labs remarkable for hyperkalemia to 6, bicarb of 20, glucose 462, BNP 4802, and troponin 0.033. CXR with significant evidence for pulmonary edema and pleural effusions L>R. EKG without hyperK changes. Nephrology was consulted in the ED for emergent HD. He was medically shifted with lasix, insulin, and lokelma. He was started on vanc and cefepime for CAP coverage.      Patient admitted to hospital medicine.

## 2023-03-17 NOTE — CONSULTS
[facility] - Emergency Dept  Nephrology  Consult Note    Patient Name: Cosme Lewis  MRN: [id number]  Admission Date: 3/16/2023  Hospital Length of Stay: 0 days  Attending Provider: Robbie Smith MD   Primary Care Physician: Primary Doctor No  Principal Problem:<principal problem not specified>    Inpatient consult to Nephrology  Consult performed by: Abdulaziz Forbes NP  Consult ordered by: Tristan Caputo MD  Reason for consult: ESRD on HD         Subjective:     HPI: Cosme Lewis is a 57 year old male with a PMHx of hypertension, type 1 diabetes, COPD, HFpEF on 3 L home O2 and ESRD on hemodialysis (M/W/F) who presented to the ED for complaints of epistaxis x 1 day. Endorses shortness of breat x 3 days. On presentation to the ED: CXR with evidence of pulmonary edema, BNP 4800, increased O2 requirements, and hyperkalemic at 6.0. Placed on HD for metabolic clearance and volume management overnight. Patient with improvement in shortness of breath this morning. Last dialysis prior to presentation was on 3/15/23. Patient is anuric at baseline. He denies any complaints at this time, other than being hungry. Nephrology was consulted for management of ESRD/HD.       Past Medical History:   Diagnosis Date    Chronic kidney disease-mineral and bone disorder 10/12/2022    COPD (chronic obstructive pulmonary disease)     Diabetes mellitus type 1     ESRD on dialysis     Hypertension     Hypervolemia 2/22/2023    Hyponatremia 3/4/2023    SOB (shortness of breath) 10/12/2022    Patient with history COPD treated with Ellipta the albuterol, fluticasone-salmeterol tachypneic in the ED saturating 94% and 6 L on nasal cannula, patient does not know how many  he uses it is in nursing home.    -duo nebs Q 4  Given that patient is a poor historian, and in the setting of left lower extremity edema and history of coagulopathy PE cannot be ruled out.  Will workup for possible infec       Past Surgical History:   Procedure  Laterality Date    AV FISTULA PLACEMENT         Review of patient's allergies indicates:  No Known Allergies  Current Facility-Administered Medications   Medication Frequency    [START ON 3/18/2023] 0.9%  NaCl infusion Once    albuterol-ipratropium 2.5 mg-0.5 mg/3 mL nebulizer solution 3 mL Q4H PRN    apixaban tablet 5 mg BID    aspirin EC tablet 81 mg Daily    atorvastatin tablet 40 mg Daily    carvediloL tablet 6.25 mg BID    cloNIDine 0.2 mg/24 hr td ptwk 1 patch ED 1 Time    dextrose 10% bolus 125 mL 125 mL PRN    dextrose 10% bolus 250 mL 250 mL PRN    dextrose 40 % gel 15,000 mg PRN    dextrose 40 % gel 30,000 mg PRN    FLUoxetine capsule 40 mg Daily    glucagon (human recombinant) injection 1 mg PRN    hydrALAZINE tablet 100 mg Q8H    insulin aspart U-100 pen 0-5 Units QID (AC + HS) PRN    isosorbide mononitrate 24 hr tablet 30 mg BID    melatonin tablet 6 mg Nightly PRN    mupirocin 2 % ointment BID    naloxone 0.4 mg/mL injection 0.02 mg PRN    NIFEdipine 24 hr tablet 90 mg Daily    sodium chloride 0.9% flush 10 mL PRN    sodium chloride 0.9% flush 10 mL Q12H PRN    tiotropium bromide 2.5 mcg/actuation inhaler 2 puff Daily    vitamin renal formula (B-complex-vitamin c-folic acid) 1 mg per capsule 1 capsule Daily     Current Outpatient Medications   Medication    acetaminophen (TYLENOL) 650 MG TbSR    albuterol (PROVENTIL/VENTOLIN HFA) 90 mcg/actuation inhaler    albuterol-ipratropium (DUO-NEB) 2.5 mg-0.5 mg/3 mL nebulizer solution    apixaban (ELIQUIS) 5 mg Tab    aspirin (ECOTRIN) 81 MG EC tablet    atorvastatin (LIPITOR) 40 MG tablet    carvediloL (COREG) 3.125 MG tablet    cetirizine (ZYRTEC) 10 MG tablet    cloNIDine 0.3 mg/24 hr td ptwk (CATAPRES) 0.3 mg/24 hr    epoetin xavier (PROCRIT) 10,000 unit/mL injection    erythromycin (ROMYCIN) ophthalmic ointment    FLUoxetine 40 MG capsule    fluticasone-salmeterol diskus inhaler 250-50 mcg    GLUCAGON EMERGENCY KIT,  HUMAN, 1 mg injection    hydrALAZINE (APRESOLINE) 100 MG tablet    HYDROPHILIC CREAM TOP    insulin detemir U-100 (LEVEMIR FLEXTOUCH) 100 unit/mL (3 mL) SubQ InPn pen    insulin lispro (HUMALOG U-100 INSULIN) 100 unit/mL injection    isosorbide mononitrate (IMDUR) 30 MG 24 hr tablet    melatonin 3 mg TbDL    NIFEdipine (PROCARDIA-XL) 90 MG (OSM) 24 hr tablet    nut.tx.imp.renal fxn,lac-reduc (NEPRO CARB STEADY ORAL)    sodium chloride (OCEAN) 0.65 % nasal spray    tiotropium bromide (SPIRIVA RESPIMAT) 2.5 mcg/actuation inhaler    triamcinolone acetonide 0.025 % Lotn    vitamin renal formula, B-complex-vitamin c-folic acid, (NEPHROCAP) 1 mg Cap     Family History       Problem Relation (Age of Onset)    Diabetes Mother          Tobacco Use    Smoking status: Never    Smokeless tobacco: Never   Substance and Sexual Activity    Alcohol use: Not Currently    Drug use: Not on file    Sexual activity: Not Currently     Review of Systems   Constitutional: Negative.    HENT: Negative.     Eyes: Negative.    Respiratory:  Positive for shortness of breath.    Cardiovascular: Negative.    Musculoskeletal: Negative.    Skin: Negative.    Neurological: Negative.    Psychiatric/Behavioral: Negative.     Objective:     Vital Signs (Most Recent):  Temp: 97.4 °F (36.3 °C) (03/17/23 0800)  Pulse: 65 (03/17/23 1016)  Resp: 19 (03/17/23 1016)  BP: (!) 180/88 (03/17/23 1016)  SpO2: (!) 93 % (03/17/23 1016)   Vital Signs (24h Range):  Temp:  [97.4 °F (36.3 °C)-97.9 °F (36.6 °C)] 97.4 °F (36.3 °C)  Pulse:  [57-77] 65  Resp:  [15-30] 19  SpO2:  [84 %-100 %] 93 %  BP: (170-218)/() 180/88     Weight: 70.8 kg (156 lb) (03/16/23 2336)  Body mass index is 23.04 kg/m².  Body surface area is 1.86 meters squared.    I/O last 3 completed shifts:  In: 44.6 [IV Piggyback:44.6]  Out: -     Physical Exam  Constitutional:       Appearance: He is ill-appearing.   HENT:      Head: Normocephalic and atraumatic.      Nose: Nose  normal.      Mouth/Throat:      Mouth: Mucous membranes are moist.      Pharynx: Oropharynx is clear.   Eyes:      Conjunctiva/sclera: Conjunctivae normal.   Cardiovascular:      Rate and Rhythm: Normal rate.      Comments: LUE AVF with +thrill and +bruit   Pulmonary:      Effort: Pulmonary effort is normal.      Breath sounds: Rales present.   Abdominal:      General: There is distension.   Musculoskeletal:      Cervical back: Normal range of motion and neck supple.      Right lower leg: Edema present.      Left lower leg: Edema present.   Skin:     General: Skin is warm and dry.   Neurological:      General: No focal deficit present.      Mental Status: He is alert and oriented to person, place, and time.   Psychiatric:         Mood and Affect: Mood normal.         Behavior: Behavior normal.       Significant Labs:  CBC:   Recent Labs   Lab 03/16/23  2345   WBC 3.54*   RBC 2.99*   HGB 8.7*   HCT 27.9*      MCV 93   MCH 29.1   MCHC 31.2*     CMP:   Recent Labs   Lab 03/16/23  2345   *   CALCIUM 8.5*   ALBUMIN 3.0*   PROT 6.2   *   K 6.0*   CO2 20*   CL 95   BUN 92*   CREATININE 5.7*   ALKPHOS 209*   ALT 26   AST 21   BILITOT 0.7     All labs within the past 24 hours have been reviewed.        Assessment/Plan:     Cardiac/Vascular  Hypertensive urgency  -Management per primary team     Renal/  Chronic kidney disease-mineral and bone disorder  -Low phos diet   -No indication for phos binders at this time   -Trend phos levels with daily labs     ESRD (end stage renal disease)  Outpatient HD Information:    -Outpatient HD unit: [facility]   -HD tx days: MWF   -HD tx time: 4hrs   -Last HD tx prior to presentation: 3/15/23  -HD access: LUE AVF   -HD modality: iHD   -Residual urine: Anuric       Plan/Recommendations:    -S/p HD today for metabolic clearance and volume management. Patient still hypervolemic on exam and requiring oxygen. Plan for additional HD treatment tomorrow for further volume management    -Renal diet, if not NPO   -Strict I/O's and daily weights  -Daily renal function panels   -Renally dose meds      Oncology  Anemia in ESRD (end-stage renal disease)  -Target Hg of 10-12  -Transfuse for Hg <7.0  -Defer epo at this time 2/2 hypertensive urgency         Thank you for your consult. I will follow-up with patient. Please contact us if you have any additional questions.    Abdulaziz Forbes, NP  Nephrology  [facility] - [facility] Dept

## 2023-03-17 NOTE — HPI
Cosme Lewis is a 57 year old male with a PMHx of hypertension, type 1 diabetes, COPD, HFpEF on 3 L home O2 and ESRD on hemodialysis (M/W/F) who presented to the ED for complaints of epistaxis x 1 day. Endorses shortness of breat x 3 days. On presentation to the ED: CXR with evidence of pulmonary edema, BNP 4800, increased O2 requirements, and hyperkalemic at 6.0. Placed on HD for metabolic clearance and volume management overnight. Patient with improvement in shortness of breath this morning. Last dialysis prior to presentation was on 3/15/23. Patient is anuric at baseline. He denies any complaints at this time, other than being hungry. Nephrology was consulted for management of ESRD/HD.    abilities impacting task completion. Goal 1: Ongoing HEP     Continue      []Met  [x]Partially met  []Not met   Goal 2: Patient will consistently utilize x4 core words during activities given verbal prompts       DNT see subjective      []Met  [x]Partially met  []Not met   Goal 3: Patient will select a named item from a F:2 on the iPad in 7/10 trials to increase selection accuracy       DNT see subjective     []Met  [x]Partially met  []Not met     LONG TERM GOALS/ TREATMENT SESSION:  Goal 1: Patient will independently communicate x8 wants and needs using an alternative form of communication Goal progressing.   []Met  [x]Partially met  []Not met       EDUCATION/HOME EXERCISE PROGRAM (HEP)  New Education/HEP provided to patient/family/caregiver:  Continue HEP    Method of Education:     [x]Discussion     []Demonstration    [] Written     []Other  Evaluation of Patients Response to Education:         [x]Patient and or caregiver verbalized understanding  []Patient and or Caregiver Demonstrated without assistance   []Patient and or Caregiver Demonstrated with assistance  []Needs additional instruction to demonstrate understanding of education    ASSESSMENT  Patient tolerated todays treatment session:    [x] Good   []  Fair   []  Poor  Limitations/difficulties with treatment session due to:   []Pain     []Fatigue     []Other medical complications     []Other    Comments:    PLAN  [x]Continue with current plan of care  []Medical [facility]  []IHold per patient request  [] Change Treatment plan:  [] Insurance hold  __ Other     TIME   Time Treatment session was INITIATED 0830   Time Treatment session was STOPPED 0900   Time Coded Treatment Minutes 30     Charges: 1  Electronically signed by:    Mario Villatoro M.S.CCC-SLP            Date:11/11/2020

## 2023-03-18 NOTE — PLAN OF CARE
Problem: Adult Inpatient Plan of Care  Goal: Plan of Care Review  Outcome: Ongoing, Progressing  Goal: Patient-Specific Goal (Individualized)  Outcome: Ongoing, Progressing  Goal: Absence of Hospital-Acquired Illness or Injury  Outcome: Ongoing, Progressing  Goal: Optimal Comfort and Wellbeing  Outcome: Ongoing, Progressing  Goal: Readiness for Transition of Care  Outcome: Ongoing, Progressing     Problem: Diabetes Comorbidity  Goal: Blood Glucose Level Within Targeted Range  Outcome: Ongoing, Progressing     Problem: Hemodynamic Instability (Hemodialysis)  Goal: Effective Tissue Perfusion  Outcome: Ongoing, Progressing

## 2023-03-18 NOTE — PLAN OF CARE
Pt had HD today, ran for 3 hours and had 3 liters removed. Pt lost peripheral access, unable to flush IV and needs another line before blood could be run. Pt continues to experience severe pain to penis area. Continue to keep dry clean and use A&D ointment.   Problem: Device-Related Complication Risk (Hemodialysis)  Goal: Safe, Effective Therapy Delivery  Outcome: Ongoing, Progressing     Problem: Hemodynamic Instability (Hemodialysis)  Goal: Effective Tissue Perfusion  Outcome: Ongoing, Progressing     Problem: Infection (Hemodialysis)  Goal: Absence of Infection Signs and Symptoms  Outcome: Ongoing, Progressing     Problem: Adult Inpatient Plan of Care  Goal: Plan of Care Review  Outcome: Ongoing, Progressing  Goal: Patient-Specific Goal (Individualized)  Outcome: Ongoing, Progressing  Goal: Absence of Hospital-Acquired Illness or Injury  Outcome: Ongoing, Progressing  Goal: Optimal Comfort and Wellbeing  Outcome: Ongoing, Progressing  Goal: Readiness for Transition of Care  Outcome: Ongoing, Progressing     Problem: Diabetes Comorbidity  Goal: Blood Glucose Level Within Targeted Range  Outcome: Ongoing, Progressing     Problem: Skin Injury Risk Increased  Goal: Skin Health and Integrity  Outcome: Ongoing, Progressing

## 2023-03-18 NOTE — HOSPITAL COURSE
Readmitted to hospital medicine following discharge on 3/15 for acute on chronic hypoxic respiratory failure likely 2/2 to missed HD session and flash pulmonary edema. Patient was initially on 10 L HFNC which improved to his baseline oxygen requirement following HD. Hospital course c/b labile blood sugars with numbers ranging from 400s to 30 within a day. Left eye with redness and pain with blinking - ophthalmology recommending artificial tears and ointment and will f/u patient in clinic. On 3/18, patient noted to have a low Hgb at 6.9 and received 1 unit of pRBC's with subsequent stabilization of H/H. Patient on 4L NC still some SOB, will continue to wean down supplemental O2. TTE on 3/20/23 demonstrated HFpEF (55%) 2/2 grade II diastolic dysfunction and CVP 15 with persistent pulmonary hypertension. Patient dialyzed on 3/20 and is now back on his MWF HD schedule. He is medically ready for dc back to [facility] with close PCP, palliative medicine, and ophthalmology f/u.

## 2023-03-18 NOTE — ASSESSMENT & PLAN NOTE
Patient's FSGs are uncontrolled due to hyperglycemia on current medication regimen.  Last A1c reviewed-   Lab Results   Component Value Date    HGBA1C 9.5 (H) 03/06/2023     Most recent fingerstick glucose reviewed-   Recent Labs   Lab 03/18/23  0827 03/18/23  1310 03/18/23  1316 03/18/23  1348   POCTGLUCOSE 172* 40* 30* 150*     Current correctional scale  Low  Maintain anti-hyperglycemic dose as follows-   Antihyperglycemics (From admission, onward)    Start     Stop Route Frequency Ordered    03/18/23 2100  insulin detemir U-100 pen 6 Units         -- SubQ 2 times daily 03/18/23 1340    03/18/23 1130  insulin aspart U-100 pen 5 Units         -- SubQ 3 times daily with meals 03/18/23 0835    03/17/23 0730  insulin aspart U-100 pen 0-5 Units         -- SubQ Before meals & nightly PRN 03/17/23 0632        Hold Oral hypoglycemics while patient is in the hospital.

## 2023-03-18 NOTE — NURSING
Nurse attempted to flush pt's line, unable to flush. Let pt know that we would need to find another peripheral IV in order to run blood. Pt stated that he did not wish for nurse to start another IV right now and stated he would like nurse to come back and attempt. Nurse will attempt to start another peripheral prior to shift change. IV pole sanitized and bleached to prep for blood.

## 2023-03-18 NOTE — SUBJECTIVE & OBJECTIVE
Interval History: No events overnight, labile sugars 400s-30s. On 4L nasal cannula with SpO2 91%.  systolic. Denies dyspnea or chest pain, some left eye pain but no vision change    Review of Systems   Constitutional:  Negative for chills and fever.   HENT:  Negative for nosebleeds and sore throat.    Respiratory:  Positive for shortness of breath. Negative for cough.    Cardiovascular:  Negative for chest pain and leg swelling.   Gastrointestinal:  Negative for abdominal pain, diarrhea, nausea and vomiting.   Genitourinary:  Positive for decreased urine volume (chronically oliguric). Negative for dysuria.   Neurological:  Negative for dizziness and light-headedness.   Objective:     Vital Signs (Most Recent):  Temp: 97.6 °F (36.4 °C) (03/18/23 1311)  Pulse: 67 (03/18/23 1311)  Resp: 18 (03/18/23 1311)  BP: (!) 173/83 (03/18/23 1311)  SpO2: 98 % (03/18/23 1311)   Vital Signs (24h Range):  Temp:  [96.7 °F (35.9 °C)-98.3 °F (36.8 °C)] 97.6 °F (36.4 °C)  Pulse:  [56-72] 67  Resp:  [12-18] 18  SpO2:  [91 %-98 %] 98 %  BP: (142-188)/(68-84) 173/83     Weight: 70.8 kg (156 lb)  Body mass index is 23.04 kg/m².    Intake/Output Summary (Last 24 hours) at 3/18/2023 1537  Last data filed at 3/18/2023 1215  Gross per 24 hour   Intake 250 ml   Output 500 ml   Net -250 ml      Physical Exam  Vitals and nursing note reviewed.   Constitutional:       General: He is not in acute distress.     Appearance: Normal appearance. He is ill-appearing (chronically). He is not diaphoretic.   HENT:      Head: Normocephalic and atraumatic.      Right Ear: External ear normal.      Left Ear: External ear normal.      Nose: Nose normal.      Mouth/Throat:      Mouth: Mucous membranes are moist.   Eyes:      General: No scleral icterus.        Right eye: No discharge.         Left eye: No discharge.      Extraocular Movements: Extraocular movements intact.      Comments: Left eye with subconjunctival hemorrhage   Cardiovascular:      Rate  and Rhythm: Normal rate and regular rhythm.      Heart sounds: Normal heart sounds. No murmur heard.  Pulmonary:      Effort: Pulmonary effort is normal. No respiratory distress.      Breath sounds: Rales present. No wheezing.   Abdominal:      General: Bowel sounds are normal. There is no distension.      Palpations: Abdomen is soft. There is no mass.      Tenderness: There is no abdominal tenderness.   Musculoskeletal:         General: No swelling or deformity. Normal range of motion.      Cervical back: Normal range of motion and neck supple.      Right lower leg: No edema.      Left lower leg: No edema.   Skin:     General: Skin is warm.      Coloration: Skin is not jaundiced.      Findings: No bruising.   Neurological:      Mental Status: He is alert and oriented to person, place, and time.   Psychiatric:         Mood and Affect: Mood normal.         Behavior: Behavior normal.         Thought Content: Thought content normal.       Significant Labs: All pertinent labs within the past 24 hours have been reviewed.    Significant Imaging: I have reviewed all pertinent imaging results/findings within the past 24 hours.

## 2023-03-18 NOTE — PROGRESS NOTES
[facility] Medicine  Progress Note    Patient Name: Cosme Lewis  MRN: [id number]  Patient Class: IP- Inpatient   Admission Date: 3/16/2023  Length of Stay: 1 days  Attending Physician: Guerita Carter DO  Primary Care Provider: Primary Doctor Rochelle        Subjective:     Principal Problem:Acute on chronic respiratory failure with hypoxia        HPI:  Cosme Lewis is a 57 y.o. with ESRD on HD MWF, DM1, renovascular HTN, Chronic respiratory failure (on 3L of home O2), HFpEF, and malnutrition who presents to the ED for shortness of breath and epistaxis. He denies any nose bleeds on our encounter. He does endorse difficulty breathing but denies chest pain. He reports a penile injury 2/2 a condom catheter and has pain from the area. Denies fever, chills, diarrhea, cough, and dysuria. He still produces urine and his last complete session of HD was on 3/14 while admitted for volume overload from a missed HD session.       Of note, patient recently hospitalized at [facility] 3/10-3/15 for volume overload 2/2 missed HD session with periorbital edema, acute on chronic hypoxic respiratory failure, and hospital course that was complicated by bleeding AVF. He received a course of CAP coverage with CTX and doxycycline due to c/f an aspiration event. He was dc'd back to [facility] on his home O2.     In the ED, patient arrived hypertensive to 191/86 and satting 95% on 6LNC. Patient subsequently desatted to 84% and was placed on 15L HFNC with improvement to mid 90's%. Labs remarkable for hyperkalemia to 6, bicarb of 20, glucose 462, BNP 4802, and troponin 0.033. CXR with significant evidence for pulmonary edema and pleural effusions L>R. EKG without hyperK changes. Nephrology was consulted in the ED for emergent HD. He was medically shifted with lasix, insulin, and lokelma. He was started on vanc and cefepime for CAP coverage.      Patient admitted to hospital medicine.      Overview/Hospital Course:  Readmitted  to hospital medicine following discharge on 3/15 for acute on chronic hypoxic respiratory failure likely 2/2 to missed HD session and flash pulmonary edema. Patient was initially on 10 L HFNC which improved to his baseline oxygen requirement following HD. Hospital course c/b labile blood sugars with numbers ranging from 400s to 30 within a day. Left eye with redness and pain with blinking - ophthalmology recommending artificial tears and ointment and will re-assess if not improved on this regimen.      Interval History: No events overnight, labile sugars 400s-30s. On 4L nasal cannula with SpO2 91%.  systolic. Denies dyspnea or chest pain, some left eye pain but no vision change    Review of Systems   Constitutional:  Negative for chills and fever.   HENT:  Negative for nosebleeds and sore throat.    Respiratory:  Positive for shortness of breath. Negative for cough.    Cardiovascular:  Negative for chest pain and leg swelling.   Gastrointestinal:  Negative for abdominal pain, diarrhea, nausea and vomiting.   Genitourinary:  Positive for decreased urine volume (chronically oliguric). Negative for dysuria.   Neurological:  Negative for dizziness and light-headedness.   Objective:     Vital Signs (Most Recent):  Temp: 97.6 °F (36.4 °C) (03/18/23 1311)  Pulse: 67 (03/18/23 1311)  Resp: 18 (03/18/23 1311)  BP: (!) 173/83 (03/18/23 1311)  SpO2: 98 % (03/18/23 1311)   Vital Signs (24h Range):  Temp:  [96.7 °F (35.9 °C)-98.3 °F (36.8 °C)] 97.6 °F (36.4 °C)  Pulse:  [56-72] 67  Resp:  [12-18] 18  SpO2:  [91 %-98 %] 98 %  BP: (142-188)/(68-84) 173/83     Weight: 70.8 kg (156 lb)  Body mass index is 23.04 kg/m².    Intake/Output Summary (Last 24 hours) at 3/18/2023 1537  Last data filed at 3/18/2023 1215  Gross per 24 hour   Intake 250 ml   Output 500 ml   Net -250 ml      Physical Exam  Vitals and nursing note reviewed.   Constitutional:       General: He is not in acute distress.     Appearance: Normal appearance. He  is ill-appearing (chronically). He is not diaphoretic.   HENT:      Head: Normocephalic and atraumatic.      Right Ear: External ear normal.      Left Ear: External ear normal.      Nose: Nose normal.      Mouth/Throat:      Mouth: Mucous membranes are moist.   Eyes:      General: No scleral icterus.        Right eye: No discharge.         Left eye: No discharge.      Extraocular Movements: Extraocular movements intact.      Comments: Left eye with subconjunctival hemorrhage   Cardiovascular:      Rate and Rhythm: Normal rate and regular rhythm.      Heart sounds: Normal heart sounds. No murmur heard.  Pulmonary:      Effort: Pulmonary effort is normal. No respiratory distress.      Breath sounds: Rales present. No wheezing.   Abdominal:      General: Bowel sounds are normal. There is no distension.      Palpations: Abdomen is soft. There is no mass.      Tenderness: There is no abdominal tenderness.   Musculoskeletal:         General: No swelling or deformity. Normal range of motion.      Cervical back: Normal range of motion and neck supple.      Right lower leg: No edema.      Left lower leg: No edema.   Skin:     General: Skin is warm.      Coloration: Skin is not jaundiced.      Findings: No bruising.   Neurological:      Mental Status: He is alert and oriented to person, place, and time.   Psychiatric:         Mood and Affect: Mood normal.         Behavior: Behavior normal.         Thought Content: Thought content normal.       Significant Labs: All pertinent labs within the past 24 hours have been reviewed.    Significant Imaging: I have reviewed all pertinent imaging results/findings within the past 24 hours.      Assessment/Plan:      * Acute on chronic respiratory failure with hypoxia  Patient with Hypoxic Respiratory failure which is Acute on chronic.  he is on home oxygen at 3 LPM. Supplemental oxygen was provided and noted- Oxygen Concentration (%):  [36] 36.   Signs/symptoms of respiratory failure  include- increased work of breathing. Contributing diagnoses includes - Pleural effusion and pulmonary edema 2/2 volume overload and HTN Labs and images were reviewed. Patient Has not had a recent ABG. Will treat underlying causes and adjust management of respiratory failure as follows.    BNP elevated to 4802 and CXR with significant pulmonary edema and pleural effusions  Satting 95% on 6LNC when first arrived to ED  Patient subsequently desatted to 84% and was placed on 15L HFNC with improvement to mid 90's%.   Following HD, patient satting 95% on 4L NC    - increased O2 requirement from baseline, wean down O2 as tolerated  - hold antibiotics as patient just completed a course of CAP coverage  - nephrology consulted for emergent HD in the ED, appreciate assitance    Penis abrasion, initial encounter  Patient presents with skin break down to penis 2/2 condom catheter use.    - barrier ointment BID  - wound care consulted, appreciate assitance      Hyperkalemia  Hyperkalemic to 6 on arrival without EKG changes  Nephrology consulted for emergent HD in the ED  Medically managed with lasix, insulin, and lokelma    - f/u pm BMP  - monitor renal function daily  - appreciate nephrology assistance with further HD needs    Type 2 diabetes mellitus with hyperglycemia, with long-term current use of insulin  Patient's FSGs are uncontrolled due to hyperglycemia on current medication regimen.  Last A1c reviewed-   Lab Results   Component Value Date    HGBA1C 9.5 (H) 03/06/2023     Most recent fingerstick glucose reviewed-   Recent Labs   Lab 03/18/23  0827 03/18/23  1310 03/18/23  1316 03/18/23  1348   POCTGLUCOSE 172* 40* 30* 150*     Current correctional scale  Low  Maintain anti-hyperglycemic dose as follows-   Antihyperglycemics (From admission, onward)    Start     Stop Route Frequency Ordered    03/18/23 2100  insulin detemir U-100 pen 6 Units         -- SubQ 2 times daily 03/18/23 1340    03/18/23 1130  insulin aspart  U-100 pen 5 Units         -- SubQ 3 times daily with meals 03/18/23 0835    03/17/23 0730  insulin aspart U-100 pen 0-5 Units         -- SubQ Before meals & nightly PRN 03/17/23 0632        Hold Oral hypoglycemics while patient is in the hospital.    Hypertensive urgency  Patient arrived hypertensive at 191/86, highest while in ED at 214/102  Received po carvedilol, clonidine patch, iv hydralazine 10mg x 3, IMDUR, nifedipine  Nephrology consulted for emergent HD in the ED    - continue home anti-hypertensives    - Nifedipine to 90 mg qD              - Carvedilol 6.25 mg BID              - Clonidine patch, replaced today              - Imdur 30 mg BID              - Hydralazine 100 mg TID  - Will continue to monitor vital signs.   - Will aim for controlled BP reduction by medications noted above.   - Monitor and mitigate end organ damage as indicated.    HLD (hyperlipidemia)  Continue home statin      Renovascular hypertension  See hypertensive urgency      Anemia in ESRD (end-stage renal disease)  Hb 8.7 on arrival    -Target Hg of 10-12  -Transfuse for Hg <7.0  -Defer epo at this time 2/2 hypertensive urgency       Multiple subsegmental pulmonary emboli without acute cor pulmonale  Hx of pulmonary thromboembolism within a lobar branch to the right lower lobe noting additional pulmonary thromboembolism within segmental branches to the right upper lobe in 10/2022     CTA on 3/12/23 negative for acute or chronic PE  BLE DVT US on 3/12/23 negative     - resume home eliquis      Chronic kidney disease-mineral and bone disorder  Nephrology consulted for ESRD on HD, appreciate assistance  -Low phos diet   -No indication for phos binders at this time   -Trend phos levels with daily labs       ESRD (end stage renal disease)  HD MWF with AVF to [facility]  Patient last HD session on 3/14 while admitted at [facility] [place] [place]  Still produces urine     - nephrology consulted for emergent HD in the ED, appreciate assistance  - renally dose all  medications  - avoid nephrotoxic agents  - strict I&O  - renal diet fluid restricted to 1L  - monitor renal function daily      VTE Risk Mitigation (From admission, onward)         Ordered     apixaban tablet 5 mg  2 times daily         03/17/23 0638     IP VTE HIGH RISK PATIENT  Once         03/17/23 0638     Place sequential compression device  Until discontinued         03/17/23 0632     Reason for No Pharmacological VTE Prophylaxis  Once        Question:  Reasons:  Answer:  Already adequately anticoagulated on oral Anticoagulants    03/17/23 0632                Discharge Planning   GERA: 3/20/2023     Code Status: Full Code   Is the patient medically ready for discharge?: No    Reason for patient still in hospital (select all that apply): Treatment                     Nuvia Gould DO  Department of Hospital Medicine   [facility]

## 2023-03-18 NOTE — NURSING
Nurses Note -- 4 Eyes      3/17/2023   11:14 PM      Skin assessed during: Admit      [x] No Pressure Injuries Present    []Prevention Measures Documented      [] Yes- Altered Skin Integrity Present or Discovered   [] LDA Added if Not in Epic (Describe Wound)   [] New Altered Skin Integrity was Present on Admit and Documented in LDA   [] Wound Image Taken    Wound Care Consulted? No    Attending Nurse:  Jeff Thibodeaux LPN     Second RN/Staff Member:  Jessenia Pineda RN

## 2023-03-18 NOTE — PROGRESS NOTES
[facility]    Cosme Lewis is a 57 y.o. male currently on hemodialysis for removal of uremic toxins and volume.     Patient seen and evaluated on hemodialysis, tolerating treatment, see HD flowsheet for vitals and assessments.    No Hypotension, chest pain, shortness of breath, cramping, nausea or vomiting.      Labs have been reviewed and the dialysate bath has been adjusted.     Labs:     Recent Labs   Lab 03/14/23  0607 03/15/23  0459 03/16/23  2345   * 131* 129*   K 5.4* 4.9 6.0*   CL 98 100 95   CO2 25 23 20*   BUN 66* 49* 92*   CREATININE 5.5* 4.4* 5.7*   CALCIUM 8.4* 8.2* 8.5*   PHOS 4.0 3.8 5.3*     Recent Labs   Lab 03/14/23  0607 03/15/23  0459 03/16/23  2345   WBC 3.38* 3.37* 3.54*   HGB 9.2* 9.3* 8.7*   HCT 30.0* 30.2* 27.9*    160 164     Lab Results   Component Value Date    FESATURATED 19 (L) 02/23/2023    FERRITIN 1,803 (H) 02/23/2023        Assessment/Plan      Ultrafiltration goal: Liters: 3L. Duration: 3 hours      - Seen on dialysis today, tolerating session with current UFR, no complications.    - HD completed on yesterday 3.9 L removed. Additional HD today for volume removal /clearance.   - No lab stick/BP intake on access site  - Continue to monitor intake and output, daily weights   - Please avoid gadolinium, fleets, phos-based laxatives, NSAIDs  - Will follow closely and continue dialysis treatments while in-patient    Anemia  - Hgb 8.7, Will plan for MARCIA with dialysis treatments    BMM  - Renal diet with protein intake goal 1.5 g/kg/d if appropriate   - Novasource with meals   - F/U PO4, Mg, Calcium. And albumin levels.   - Phos 5.3.     HTN  - BP Elevated  - Goal for BP <140mmHg SBP and <90 mmHg DBP. Maintain MAP > 65 mmHg.  - Continue home antihypertensive regimen; adjust as needed.       Rosi Grant, KUMAR, APRN, FNP-C  Nephrology Department  Pager:  [phone]

## 2023-03-18 NOTE — PROGRESS NOTES
03/18/23 1215   Post-Hemodialysis Assessment   Blood Volume Processed (Liters) 75 L   Dialyzer Clearance Lightly streaked   Duration of Treatment 180 minutes   Additional Fluid Intake (mL) 250 mL   Total UF (mL) 500 mL   Net Fluid Removal 3500     HD tx completed without issue, blood returned and hemostasis achieved to both cannulation sites. Pt in NAD/VSS waiting on transport back to bed. Report given to primary RN.

## 2023-03-18 NOTE — NURSING
Pt finished HD and 3 liters removed as per Dialysis. Currently awaiting transport. Will retake pt's BP once he arrives back onto unit.

## 2023-03-18 NOTE — ASSESSMENT & PLAN NOTE
HD MWF with AVF to [facility]  Patient [place] [facility] [place]  Still produces urine     - nephrology consulted for emergent HD in the ED, appreciate assistance  - renally dose all medications  - avoid nephrotoxic agents  - strict I&O  - renal diet fluid restricted to 1L  - monitor renal function daily

## 2023-03-19 PROBLEM — H11.32 SUBCONJUNCTIVAL HEMORRHAGE OF LEFT EYE: Status: ACTIVE | Noted: 2023-01-01

## 2023-03-19 NOTE — CONSULTS
Brief consult acknowledgement note    Palliative medicine consult acknowledged and will be seen on Monday morning unless needed sooner.     Please call palliative MD on call for discussion or immediate assistance.     Thank you for the opportunity to care for this patient and family.     Please call with questions.     Alberto Hernandez MD  Palliative Medicine   [facility]  [phone] (cell)

## 2023-03-19 NOTE — PROGRESS NOTES
Consulted for picc placement, pt not currently on any vesicant medications and no nephrology clearance for picc or midline. Charge nurse ok with piv, she will let inhouse team attempt mid/picc tomorrow.

## 2023-03-19 NOTE — ASSESSMENT & PLAN NOTE
Hb 8.7 on arrival  Hgb of 6.9 on 2/18, s/p 1 unit of pRBC's -> improved to 7.8    -Target Hg of 10-12  -Transfuse for Hg <7.0  -Defer epo at this time 2/2 hypertensive urgency

## 2023-03-19 NOTE — ASSESSMENT & PLAN NOTE
Patient noted to have subconjunctival hemorrhage to left eye with associated irritation    - ophthalmology consulted, appreciate assistance   - Continue artificial tears 6 times daily in both eyes given patient's anatomy and incomplete blink   - Follow up in ophthalmology clinic within 1 month upon discharge

## 2023-03-19 NOTE — SUBJECTIVE & OBJECTIVE
Interval History: NAEON. He received 1 unit of pRBC's overnight with subsequent stabilization in Hgb to 7.8. He was dialyzed yesterday with 3L removed. On 4L nasal cannula with SpO2 93+%. Endorses SOB and reports pain to right forearm PIV, penile pain, and left eye pain.    Review of Systems   Constitutional:  Negative for chills and fever.   HENT:  Negative for nosebleeds and sore throat.    Respiratory:  Positive for shortness of breath. Negative for cough.    Cardiovascular:  Negative for chest pain and leg swelling.   Gastrointestinal:  Negative for abdominal pain, diarrhea, nausea and vomiting.   Genitourinary:  Positive for decreased urine volume (chronically oliguric). Negative for dysuria.   Neurological:  Negative for dizziness and light-headedness.   Objective:     Vital Signs (Most Recent):  Temp: 97.7 °F (36.5 °C) (03/19/23 0757)  Pulse: 88 (03/19/23 0826)  Resp: 20 (03/19/23 0826)  BP: (!) 162/80 (03/19/23 0757)  SpO2: 95 % (03/19/23 0757)   Vital Signs (24h Range):  Temp:  [97.2 °F (36.2 °C)-98 °F (36.7 °C)] 97.7 °F (36.5 °C)  Pulse:  [60-88] 88  Resp:  [16-20] 20  SpO2:  [93 %-99 %] 95 %  BP: (115-191)/(59-84) 162/80     Weight: 70.8 kg (156 lb)  Body mass index is 23.04 kg/m².    Intake/Output Summary (Last 24 hours) at 3/19/2023 0944  Last data filed at 3/18/2023 1215  Gross per 24 hour   Intake 250 ml   Output 500 ml   Net -250 ml        Physical Exam  Vitals and nursing note reviewed.   Constitutional:       General: He is not in acute distress.     Appearance: Normal appearance. He is ill-appearing (chronically). He is not diaphoretic.   HENT:      Head: Normocephalic and atraumatic.      Right Ear: External ear normal.      Left Ear: External ear normal.      Nose: Nose normal.      Comments: NC in place, no bleeding from nares appreciated     Mouth/Throat:      Mouth: Mucous membranes are moist.   Eyes:      General: No scleral icterus.        Right eye: No discharge.         Left eye: No  discharge.      Extraocular Movements: Extraocular movements intact.      Comments: Left eye with subconjunctival hemorrhage   Cardiovascular:      Rate and Rhythm: Normal rate and regular rhythm.      Heart sounds: Normal heart sounds. No murmur heard.  Pulmonary:      Effort: Pulmonary effort is normal. No respiratory distress.      Breath sounds: Rales present. No wheezing.   Abdominal:      General: There is distension.      Palpations: Abdomen is soft. There is no mass.      Tenderness: There is no abdominal tenderness.   Genitourinary:     Comments: Skin breakdown to penis  Musculoskeletal:         General: No swelling or deformity. Normal range of motion.      Cervical back: Normal range of motion and neck supple.      Right lower leg: No edema.      Left lower leg: No edema.   Skin:     General: Skin is warm.      Coloration: Skin is not jaundiced.      Findings: No bruising.   Neurological:      Mental Status: He is alert and oriented to person, place, and time.   Psychiatric:         Mood and Affect: Mood normal.         Behavior: Behavior normal.         Thought Content: Thought content normal.       Significant Labs: All pertinent labs within the past 24 hours have been reviewed.    Significant Imaging: I have reviewed all pertinent imaging results/findings within the past 24 hours.

## 2023-03-19 NOTE — PLAN OF CARE
Problem: Infection (Hemodialysis)  Goal: Absence of Infection Signs and Symptoms  Outcome: Ongoing, Progressing     Problem: Hemodynamic Instability (Hemodialysis)  Goal: Effective Tissue Perfusion  Outcome: Ongoing, Progressing     Problem: Adult Inpatient Plan of Care  Goal: Plan of Care Review  Outcome: Ongoing, Progressing  Goal: Patient-Specific Goal (Individualized)  Outcome: Ongoing, Progressing  Goal: Absence of Hospital-Acquired Illness or Injury  Outcome: Ongoing, Progressing  Goal: Optimal Comfort and Wellbeing  Outcome: Ongoing, Progressing  Goal: Readiness for Transition of Care  Outcome: Ongoing, Progressing     Problem: Diabetes Comorbidity  Goal: Blood Glucose Level Within Targeted Range  Outcome: Ongoing, Progressing     Problem: Skin Injury Risk Increased  Goal: Skin Health and Integrity  Outcome: Ongoing, Progressing     Pt received 1 unit of RBCs during shift. Tolerated well. Call light within reach and sr up x2. Will continue with POC.

## 2023-03-19 NOTE — CONSULTS
"Consultation Report  Ophthalmology Service    Date: 03/19/2023    Reason for Consult: "eye pain left"     History of Present Illness: Cosme Lewis is a 57 y.o. male with PMHx of HTN, DM I, COPD, HFpEF, and ESRD who [place] [facility]. Ophthalmology is being consulted to evaluate painful left eye for 3 days. Patient was evaluated by Dr. Hedrick on 3/10/23 for eyelid edema 2/2 anasarca. Since that time, patient states his left eye has been irritated and red. Denies any vision changes. Denies any flashes/floaters.    POcularHx: Denies history of ocular problems or past ocular surgeries.    Current eye gtts: 1 day of artificial tears    Family Hx: Denies family history of glaucoma, macular degeneration, or blindness. family history includes Diabetes in his mother.     PMHx:  has a past medical history of Chronic kidney disease-mineral and bone disorder (10/12/2022), COPD (chronic obstructive pulmonary disease), Diabetes mellitus type 1, ESRD on dialysis, Hypertension, Hypervolemia (2/22/2023), Hyponatremia (3/4/2023), and SOB (shortness of breath) (10/12/2022).     PSurgHx:  has a past surgical history that includes AV fistula placement.     Home Medications:   Prior to Admission medications    Medication Sig Start Date End Date Taking? Authorizing Provider   acetaminophen (TYLENOL) 650 MG TbSR Take 650 mg by mouth every 8 (eight) hours as needed (pain or fever over 100.4).   Yes Historical Provider   albuterol (PROVENTIL/VENTOLIN HFA) 90 mcg/actuation inhaler Inhale 2 puffs into the lungs 4 (four) times daily as needed (breathing). 9/2/22  Yes Historical Provider   apixaban (ELIQUIS) 5 mg Tab Take 5 mg by mouth 2 (two) times daily.   Yes Historical Provider   aspirin (ECOTRIN) 81 MG EC tablet Take 81 mg by mouth once daily. 9/28/22  Yes Historical Provider   atorvastatin (LIPITOR) 40 MG tablet Take 1 tablet (40 mg total) by mouth once daily.  Patient taking differently: Take 40 mg by mouth every " evening. 10/15/22 10/15/23 Yes Odessa Sleem, DO   carvediloL (COREG) 3.125 MG tablet Take 2 tablets (6.25 mg total) by mouth 2 (two) times daily. 11/23/22 11/23/23 Yes Britt Mason MD   cetirizine (ZYRTEC) 10 MG tablet Take 10 mg by mouth daily as needed (itching).   Yes Historical Provider   cloNIDine 0.3 mg/24 hr td ptwk (CATAPRES) 0.3 mg/24 hr Place 1 patch onto the skin every 7 days. 9/8/22  Yes Historical Provider   erythromycin (ROMYCIN) ophthalmic ointment Place a 1/2 inch ribbon of ointment into the lower eyelid three times a day. 2/10/23  Yes Angela Mejia MD   FLUoxetine 40 MG capsule Take 40 mg by mouth once daily. 10/2/22  Yes Historical Provider   fluticasone-salmeterol diskus inhaler 250-50 mcg Inhale 1 puff into the lungs 2 (two) times daily. 6/7/22  Yes Historical Provider   GLUCAGON EMERGENCY KIT, HUMAN, 1 mg injection 1 mg into the muscle as needed if CBG < 70 9/2/22  Yes Historical Provider   hydrALAZINE (APRESOLINE) 100 MG tablet Take 1 tablet (100 mg total) by mouth every 8 (eight) hours. 11/23/22  Yes Britt Mason MD   HYDROPHILIC CREAM TOP Apply liberal amount to affected area twice daily for dry skin   Yes Historical Provider   insulin detemir U-100 (LEVEMIR FLEXTOUCH) 100 unit/mL (3 mL) SubQ InPn pen Inject 7 Units into the skin 2 (two) times daily. 3/7/23 3/6/24 Yes Dahlia Balbuena MD   insulin lispro (HUMALOG U-100 INSULIN) 100 unit/mL injection Inject 5 Units into the skin 3 (three) times daily before meals.  Patient taking differently: Inject 5 Units into the skin 3 (three) times daily before meals. Plus sliding scale 3/7/23 3/6/24 Yes Dahlia Balbuena MD   isosorbide mononitrate (IMDUR) 30 MG 24 hr tablet Take 30 mg by mouth 2 (two) times daily. 10/10/22  Yes Historical Provider   melatonin 3 mg TbDL Take 9 mg by mouth nightly as needed (sleep).   Yes Historical Provider   NIFEdipine (PROCARDIA-XL) 90 MG (OSM) 24 hr tablet Take 1 tablet (90 mg total) by mouth once daily.  11/30/22 11/30/23 Yes Landon Darby PA-C   sodium chloride (OCEAN) 0.65 % nasal spray 2 sprays by Nasal route every 4 (four) hours as needed for Congestion.   Yes Historical Provider   tiotropium bromide (SPIRIVA RESPIMAT) 2.5 mcg/actuation inhaler Inhale 2 puffs into the lungs Daily. 9/2/22  Yes Historical Provider   triamcinolone acetonide 0.025 % Lotn Apply to the affected area twice daily   Yes Historical Provider   vitamin renal formula, B-complex-vitamin c-folic acid, (NEPHROCAP) 1 mg Cap Take 1 capsule by mouth once daily. 9/28/22  Yes Historical Provider   epoetin xavier (PROCRIT) 10,000 unit/mL injection Inject 0.7 mLs (7,000 Units total) into the skin every Tues, Thurs, Sat. 3/16/23   Nuvia Gould DO   nut.tx.imp.renal fxn,lac-reduc (NEPRO CARB STEADY ORAL) Give 1 carton by mouth with each meal.    Historical Provider        Medications this encounter:    sodium chloride 0.9%   Intravenous Once    apixaban  5 mg Oral BID    artificial tears  1 drop Both Eyes 6x Daily    aspirin  81 mg Oral Daily    atorvastatin  40 mg Oral Daily    carvediloL  6.25 mg Oral BID    cloNIDine 0.2 mg/24 hr td ptwk  1 patch Transdermal ED 1 Time    FLUoxetine  40 mg Oral Daily    fluticasone furoate-vilanteroL  1 puff Inhalation Daily    hydrALAZINE  100 mg Oral Q8H    insulin aspart U-100  5 Units Subcutaneous TIDWM    insulin detemir U-100  6 Units Subcutaneous BID    isosorbide mononitrate  30 mg Oral BID    mupirocin   Nasal BID    NIFEdipine  90 mg Oral Daily    tiotropium bromide  2 puff Inhalation Daily    vitamin renal formula (B-complex-vitamin c-folic acid)  1 capsule Oral Daily    vits A and D-white pet-lanolin   Topical (Top) BID    white petrolatum-mineral oil 57.3-42.5%   Both Eyes QHS       Allergies: has No Known Allergies.     Social:  reports that he has never smoked. He has never used smokeless tobacco. He reports that he does not currently use alcohol.     ROS: As per HPI    Ocular examination/Dilated  fundus examination:  Base Eye Exam       Visual Acuity (Snellen - Linear)         Right Left    Dist sc 20/50 20/50    Dist ph sc 20/30 20/30 -2              Tonometry (Tonopen, 7:05 AM)         Right Left    Pressure 18 19              Pupils         Dark Light Shape React APD    Right 4 2 Round Brisk None    Left 4 2 Round Brisk None              Visual Fields         Right Left     Full Full              Extraocular Movement         Right Left     Full, Ortho Full, Ortho                  Slit Lamp and Fundus Exam       External Exam         Right Left    External Facial edema Facial edema              Slit Lamp Exam         Right Left    Lids/Lashes Soft, cool pitting edema to UL. No redness, TTP, discharge Tr UL pitting edema    Conjunctiva/Sclera CAM, conjunctival chalasis Subconjunctival hemorrhage nasally and inferiorly    Cornea arcus, otherwise clear arcus, otherwise clear    Anterior Chamber shallow, formed shallow, formed    Iris Round and reactive, no obvious NV Round and reactive, no obvious NV    Lens NSC NSC    Anterior Vitreous Normal               Fundus Exam         Right Left    Disc  Normal    C/D Ratio  0.4    Macula  Flat, ERM vs old HE    Vessels  attenuated, ischemic    Periphery  scattered soft drusen, DBH, IRH, no NV . Flat and attached                      Assessment/Plan:     1. Subconjunctival Hemorrhage, OS  2. Dry Eye Syndrome, OU  - Patient with 3 days of foreign body sensation of the left eye  - Evaluated for eyelid edema by Dr. Hedrick on 3/10 - deferred OD dilation 2/2   - On blood thinners, minor trauma (including rubbing the eye) could be the culprit for the BLANQUITA. Suspect dry eye syndrome to be the reason for the foreign body sensation and irritation patient is experiencing    Recommendations  - Continue artificial tears 6 times daily in both eyes given patient's anatomy and incomplete blink  - Follow up in ophthalmology clinic within 1 month upon discharge    Celena Howell MD    [facility] Ophthalmology PGY2  03/19/2023  7:05 AM        Common Ophthalmologic Abbreviations  OD: right eye  OS: left eye  OU: both eyes  IOP: intraocular pressure  VA: visual acuity  PH: pinhole  HM: hand motion  LP: light perception  NLP: no light perception  DFE: dilated fundus examination  SLE: slit lamp examination  RD: retinal detachment   AT: artificial tears  PFAT: preservative free artificial tears

## 2023-03-19 NOTE — ASSESSMENT & PLAN NOTE
Patient presents with skin break down to penis 2/2 condom catheter use.    - barrier ointment BID  - consider topical lidocaine  - wound care consulted, appreciate assitance

## 2023-03-19 NOTE — ASSESSMENT & PLAN NOTE
Hyperkalemic to 6 on arrival without EKG changes  Nephrology consulted for emergent HD in the ED  Medically managed with lasix, insulin, and lokelma    - f/u pm BMP  - monitor renal function daily  - appreciate nephrology assistance with further HD needs    Resolved

## 2023-03-19 NOTE — PLAN OF CARE
Pt educated about snacking habits affecting blood glucose and overall health. Attending and team was at bedside attempting to continue to educate pt about constantly asking for sweets, but pt continues to want to eat what he wants despite effect on overall health. Palliative care was consulted and expects to see pt tomorrow.   Problem: Device-Related Complication Risk (Hemodialysis)  Goal: Safe, Effective Therapy Delivery  Outcome: Ongoing, Progressing     Problem: Hemodynamic Instability (Hemodialysis)  Goal: Effective Tissue Perfusion  Outcome: Ongoing, Progressing     Problem: Infection (Hemodialysis)  Goal: Absence of Infection Signs and Symptoms  Outcome: Ongoing, Progressing     Problem: Adult Inpatient Plan of Care  Goal: Plan of Care Review  Outcome: Ongoing, Progressing  Goal: Patient-Specific Goal (Individualized)  Outcome: Ongoing, Progressing  Goal: Absence of Hospital-Acquired Illness or Injury  Outcome: Ongoing, Progressing  Goal: Optimal Comfort and Wellbeing  Outcome: Ongoing, Progressing  Goal: Readiness for Transition of Care  Outcome: Ongoing, Progressing     Problem: Diabetes Comorbidity  Goal: Blood Glucose Level Within Targeted Range  Outcome: Ongoing, Progressing     Problem: Skin Injury Risk Increased  Goal: Skin Health and Integrity  Outcome: Ongoing, Progressing     Problem: Coping Ineffective  Goal: Effective Coping  Outcome: Ongoing, Progressing

## 2023-03-19 NOTE — ASSESSMENT & PLAN NOTE
Patient with Hypoxic Respiratory failure which is Acute on chronic.  he is on home oxygen at 3 LPM. Supplemental oxygen was provided and noted-  .   Signs/symptoms of respiratory failure include- increased work of breathing. Contributing diagnoses includes - Pleural effusion and pulmonary edema 2/2 volume overload and HTN Labs and images were reviewed. Patient Has not had a recent ABG. Will treat underlying causes and adjust management of respiratory failure as follows.    BNP elevated to 4802 and CXR with significant pulmonary edema and pleural effusions  Satting 95% on 6LNC when first arrived to ED  Patient subsequently desatted to 84% and was placed on 15L HFNC with improvement to mid 90's%.   Following HD, patient satting 95% on 4L NC    - increased O2 requirement from baseline, wean down O2 as tolerated  - f/u TTE  - hold antibiotics as patient just completed a course of CAP coverage  - nephrology consulted for emergent HD in the ED, appreciate assitance

## 2023-03-19 NOTE — PROGRESS NOTES
[facility] Medicine  Progress Note    Patient Name: Cosme Lewis  MRN: [id number]  Patient Class: IP- Inpatient   Admission Date: 3/16/2023  Length of Stay: 2 days  Attending Physician: Guerita Carter DO  Primary Care Provider: Primary Doctor Rochelle        Subjective:     Principal Problem:Acute on chronic respiratory failure with hypoxia        HPI:  Cosme Lewis is a 57 y.o. with ESRD on HD MWF, DM1, renovascular HTN, Chronic respiratory failure (on 3L of home O2), HFpEF, and malnutrition who presents to the ED for shortness of breath and epistaxis. He denies any nose bleeds on our encounter. He does endorse difficulty breathing but denies chest pain. He reports a penile injury 2/2 a condom catheter and has pain from the area. Denies fever, chills, diarrhea, cough, and dysuria. He still produces urine and his last complete session of HD was on 3/14 while admitted for volume overload from a missed HD session.       Of note, patient recently hospitalized at [facility] 3/10-3/15 for volume overload 2/2 missed HD session with periorbital edema, acute on chronic hypoxic respiratory failure, and hospital course that was complicated by bleeding AVF. He received a course of CAP coverage with CTX and doxycycline due to c/f an aspiration event. He was dc'd back to [facility] on his home O2.     In the ED, patient arrived hypertensive to 191/86 and satting 95% on 6LNC. Patient subsequently desatted to 84% and was placed on 15L HFNC with improvement to mid 90's%. Labs remarkable for hyperkalemia to 6, bicarb of 20, glucose 462, BNP 4802, and troponin 0.033. CXR with significant evidence for pulmonary edema and pleural effusions L>R. EKG without hyperK changes. Nephrology was consulted in the ED for emergent HD. He was medically shifted with lasix, insulin, and lokelma. He was started on vanc and cefepime for CAP coverage.      Patient admitted to hospital medicine.      Overview/Hospital Course:  Readmitted  to hospital medicine following discharge on 3/15 for acute on chronic hypoxic respiratory failure likely 2/2 to missed HD session and flash pulmonary edema. Patient was initially on 10 L HFNC which improved to his baseline oxygen requirement following HD. Hospital course c/b labile blood sugars with numbers ranging from 400s to 30 within a day. Left eye with redness and pain with blinking - ophthalmology recommending artificial tears and ointment and will f/u patient in clinic. On 3/18, patient noted to have a low Hgb at 6.9 and received 1 unit of pRBC's with subsequent stabilization of H/H. Patient on 4L NC still some SOB, will f/u TTE and continue to wean down supplemental O2.       Interval History: NAEON. He received 1 unit of pRBC's overnight with subsequent stabilization in Hgb to 7.8. He was dialyzed yesterday with 3L removed. On 4L nasal cannula with SpO2 93+%. Endorses SOB and reports pain to right forearm PIV, penile pain, and left eye pain.    Review of Systems   Constitutional:  Negative for chills and fever.   HENT:  Negative for nosebleeds and sore throat.    Respiratory:  Positive for shortness of breath. Negative for cough.    Cardiovascular:  Negative for chest pain and leg swelling.   Gastrointestinal:  Negative for abdominal pain, diarrhea, nausea and vomiting.   Genitourinary:  Positive for decreased urine volume (chronically oliguric). Negative for dysuria.   Neurological:  Negative for dizziness and light-headedness.   Objective:     Vital Signs (Most Recent):  Temp: 97.7 °F (36.5 °C) (03/19/23 0757)  Pulse: 88 (03/19/23 0826)  Resp: 20 (03/19/23 0826)  BP: (!) 162/80 (03/19/23 0757)  SpO2: 95 % (03/19/23 0757)   Vital Signs (24h Range):  Temp:  [97.2 °F (36.2 °C)-98 °F (36.7 °C)] 97.7 °F (36.5 °C)  Pulse:  [60-88] 88  Resp:  [16-20] 20  SpO2:  [93 %-99 %] 95 %  BP: (115-191)/(59-84) 162/80     Weight: 70.8 kg (156 lb)  Body mass index is 23.04 kg/m².    Intake/Output Summary (Last 24 hours) at  3/19/2023 0944  Last data filed at 3/18/2023 1215  Gross per 24 hour   Intake 250 ml   Output 500 ml   Net -250 ml        Physical Exam  Vitals and nursing note reviewed.   Constitutional:       General: He is not in acute distress.     Appearance: Normal appearance. He is ill-appearing (chronically). He is not diaphoretic.   HENT:      Head: Normocephalic and atraumatic.      Right Ear: External ear normal.      Left Ear: External ear normal.      Nose: Nose normal.      Comments: NC in place, no bleeding from nares appreciated     Mouth/Throat:      Mouth: Mucous membranes are moist.   Eyes:      General: No scleral icterus.        Right eye: No discharge.         Left eye: No discharge.      Extraocular Movements: Extraocular movements intact.      Comments: Left eye with subconjunctival hemorrhage   Cardiovascular:      Rate and Rhythm: Normal rate and regular rhythm.      Heart sounds: Normal heart sounds. No murmur heard.  Pulmonary:      Effort: Pulmonary effort is normal. No respiratory distress.      Breath sounds: Rales present. No wheezing.   Abdominal:      General: There is distension.      Palpations: Abdomen is soft. There is no mass.      Tenderness: There is no abdominal tenderness.   Genitourinary:     Comments: Skin breakdown to penis  Musculoskeletal:         General: No swelling or deformity. Normal range of motion.      Cervical back: Normal range of motion and neck supple.      Right lower leg: No edema.      Left lower leg: No edema.   Skin:     General: Skin is warm.      Coloration: Skin is not jaundiced.      Findings: No bruising.   Neurological:      Mental Status: He is alert and oriented to person, place, and time.   Psychiatric:         Mood and Affect: Mood normal.         Behavior: Behavior normal.         Thought Content: Thought content normal.       Significant Labs: All pertinent labs within the past 24 hours have been reviewed.    Significant Imaging: I have reviewed all  pertinent imaging results/findings within the past 24 hours.      Assessment/Plan:      * Acute on chronic respiratory failure with hypoxia  Patient with Hypoxic Respiratory failure which is Acute on chronic.  he is on home oxygen at 3 LPM. Supplemental oxygen was provided and noted-  .   Signs/symptoms of respiratory failure include- increased work of breathing. Contributing diagnoses includes - Pleural effusion and pulmonary edema 2/2 volume overload and HTN Labs and images were reviewed. Patient Has not had a recent ABG. Will treat underlying causes and adjust management of respiratory failure as follows.    BNP elevated to 4802 and CXR with significant pulmonary edema and pleural effusions  Satting 95% on 6LNC when first arrived to ED  Patient subsequently desatted to 84% and was placed on 15L HFNC with improvement to mid 90's%.   Following HD, patient satting 95% on 4L NC    - increased O2 requirement from baseline, wean down O2 as tolerated  - f/u TTE  - hold antibiotics as patient just completed a course of CAP coverage  - nephrology consulted for emergent HD in the ED, appreciate assitance    Subconjunctival hemorrhage of left eye  Patient noted to have subconjunctival hemorrhage to left eye with associated irritation    - ophthalmology consulted, appreciate assistance   - Continue artificial tears 6 times daily in both eyes given patient's anatomy and incomplete blink   - Follow up in ophthalmology clinic within 1 month upon discharge      Penis abrasion, initial encounter  Patient presents with skin break down to penis 2/2 condom catheter use.    - barrier ointment BID  - consider topical lidocaine  - wound care consulted, appreciate assitance      Hyperkalemia  Hyperkalemic to 6 on arrival without EKG changes  Nephrology consulted for emergent HD in the ED  Medically managed with lasix, insulin, and lokelma    - f/u pm BMP  - monitor renal function daily  - appreciate nephrology assistance with further HD  needs    Resolved    Type 2 diabetes mellitus with hyperglycemia, with long-term current use of insulin  Patient's FSGs are uncontrolled due to hyperglycemia on current medication regimen.  Last A1c reviewed-   Lab Results   Component Value Date    HGBA1C 9.5 (H) 03/06/2023     Most recent fingerstick glucose reviewed-   Recent Labs   Lab 03/18/23  0827 03/18/23  1310 03/18/23  1316 03/18/23  1348   POCTGLUCOSE 172* 40* 30* 150*     Current correctional scale  Low  Maintain anti-hyperglycemic dose as follows-   Antihyperglycemics (From admission, onward)    Start     Stop Route Frequency Ordered    03/18/23 2100  insulin detemir U-100 pen 6 Units         -- SubQ 2 times daily 03/18/23 1340    03/18/23 1130  insulin aspart U-100 pen 5 Units         -- SubQ 3 times daily with meals 03/18/23 0835    03/17/23 0730  insulin aspart U-100 pen 0-5 Units         -- SubQ Before meals & nightly PRN 03/17/23 0632        Hold Oral hypoglycemics while patient is in the hospital.    Hypertensive urgency  Patient arrived hypertensive at 191/86, highest while in ED at 214/102  Received po carvedilol, clonidine patch, iv hydralazine 10mg x 3, IMDUR, nifedipine  Nephrology consulted for emergent HD in the ED    - continue home anti-hypertensives    - Nifedipine to 90 mg qD              - Carvedilol 6.25 mg BID              - Clonidine patch, replaced today              - Imdur 30 mg BID              - Hydralazine 100 mg TID  - Will continue to monitor vital signs.   - Will aim for controlled BP reduction by medications noted above.   - Monitor and mitigate end organ damage as indicated.    HLD (hyperlipidemia)  Continue home statin      Renovascular hypertension  See hypertensive urgency      Anemia in ESRD (end-stage renal disease)  Hb 8.7 on arrival  Hgb of 6.9 on 2/18, s/p 1 unit of pRBC's -> improved to 7.8    -Target Hg of 10-12  -Transfuse for Hg <7.0  -Defer epo at this time 2/2 hypertensive urgency       Multiple subsegmental  pulmonary emboli without acute cor pulmonale  Hx of pulmonary thromboembolism within a lobar branch to the right lower lobe noting additional pulmonary thromboembolism within segmental branches to the right upper lobe in 10/2022     CTA on 3/12/23 negative for acute or chronic PE  BLE DVT US on 3/12/23 negative     - resume home eliquis      Chronic kidney disease-mineral and bone disorder  Nephrology consulted for ESRD on HD, appreciate assistance  -Low phos diet   -No indication for phos binders at this time   -Trend phos levels with daily labs       ESRD (end stage renal disease)  HD MWF with AVF to POP  Patient last HD session on 3[place]14 [place] [facility]  Still produces urine     - nephrology consulted for emergent HD in the ED, appreciate assistance  - renally dose all medications  - avoid nephrotoxic agents  - strict I&O  - renal diet fluid restricted to 1L  - monitor renal function daily      VTE Risk Mitigation (From admission, onward)         Ordered     apixaban tablet 5 mg  2 times daily         03/17/23 0638     IP VTE HIGH RISK PATIENT  Once         03/17/23 0638     Place sequential compression device  Until discontinued         03/17/23 0632     Reason for No Pharmacological VTE Prophylaxis  Once        Question:  Reasons:  Answer:  Already adequately anticoagulated on oral Anticoagulants    03/17/23 0632                Discharge Planning   GERA: 3/20/2023     Code Status: Full Code   Is the patient medically ready for discharge?: No    Reason for patient still in hospital (select all that apply): Patient trending condition and Imaging                     Cecilia Dorman MD  Department of Hospital Medicine   [facility] - Med Surg

## 2023-03-20 NOTE — PLAN OF CARE
Briana provided NH orders to [facility], Pt in HD, will be ok to dc after he returns. Sw to follow with bed and transport.

## 2023-03-20 NOTE — SUBJECTIVE & OBJECTIVE
Interval History: NAEON. Patient with oozing epistaxis from bilateral nares. On 4L nasal cannula with SpO2 93+%. Endorses SOB. Will be dialyzed today and dc'd to his NH.    Review of Systems   Constitutional:  Negative for chills and fever.   HENT:  Negative for nosebleeds and sore throat.    Respiratory:  Positive for shortness of breath. Negative for cough.    Cardiovascular:  Negative for chest pain and leg swelling.   Gastrointestinal:  Negative for abdominal pain, diarrhea, nausea and vomiting.   Genitourinary:  Positive for decreased urine volume (chronically oliguric). Negative for dysuria.   Neurological:  Negative for dizziness and light-headedness.   Objective:     Vital Signs (Most Recent):  Temp: 97.2 °F (36.2 °C) (03/20/23 1507)  Pulse: 60 (03/20/23 1530)  Resp: 18 (03/20/23 1507)  BP: 128/69 (03/20/23 1530)  SpO2: (!) 93 % (03/20/23 1114)   Vital Signs (24h Range):  Temp:  [96.1 °F (35.6 °C)-98.2 °F (36.8 °C)] 97.2 °F (36.2 °C)  Pulse:  [59-69] 60  Resp:  [17-18] 18  SpO2:  [92 %-95 %] 93 %  BP: (102-159)/(58-79) 128/69     Weight: 70.8 kg (156 lb)  Body mass index is 23.04 kg/m².    Intake/Output Summary (Last 24 hours) at 3/20/2023 1547  Last data filed at 3/20/2023 0620  Gross per 24 hour   Intake 240 ml   Output 0 ml   Net 240 ml        Physical Exam  Vitals and nursing note reviewed.   Constitutional:       General: He is not in acute distress.     Appearance: Normal appearance. He is ill-appearing (chronically). He is not diaphoretic.   HENT:      Head: Normocephalic and atraumatic.      Right Ear: External ear normal.      Left Ear: External ear normal.      Nose: Nose normal.      Comments: NC in place  Oozing epistaxis from bilateral nares     Mouth/Throat:      Mouth: Mucous membranes are moist.   Eyes:      General: No scleral icterus.        Right eye: No discharge.         Left eye: No discharge.      Extraocular Movements: Extraocular movements intact.      Comments: Left eye with  subconjunctival hemorrhage   Cardiovascular:      Rate and Rhythm: Normal rate and regular rhythm.      Heart sounds: Normal heart sounds. No murmur heard.  Pulmonary:      Effort: Pulmonary effort is normal. No respiratory distress.      Breath sounds: Rales present. No wheezing.   Abdominal:      General: There is distension.      Palpations: Abdomen is soft. There is no mass.      Tenderness: There is no abdominal tenderness.   Genitourinary:     Comments: Skin breakdown to penis  Musculoskeletal:         General: No swelling or deformity. Normal range of motion.      Cervical back: Normal range of motion and neck supple.      Right lower leg: No edema.      Left lower leg: No edema.   Skin:     General: Skin is warm.      Coloration: Skin is not jaundiced.      Findings: No bruising.   Neurological:      Mental Status: He is alert and oriented to person, place, and time.   Psychiatric:         Mood and Affect: Mood normal.         Behavior: Behavior normal.         Thought Content: Thought content normal.       Significant Labs: All pertinent labs within the past 24 hours have been reviewed.    Significant Imaging: I have reviewed all pertinent imaging results/findings within the past 24 hours.

## 2023-03-20 NOTE — PLAN OF CARE
CHW met with patient/family at bedside. Patient experience rounding completed and reviewed the following.     Do you know your discharge plan? Yes SNF    Have you discussed your needs and preferences with your SW/CM? Yes or No     Assigned SW/CM notified of any patient/family needs or concerns.

## 2023-03-20 NOTE — PROGRESS NOTES
Hemodialysis treatment completed.    Treatment time received: 3 hours    Net fluid removed: 2.4 liters    Tolerated Treatment well. VSS. No acute distress.    Blood returned and needles X2 removed. Pressure held till hemostasis obtained.  Placed gauze and tape dressing to site.  Fistual with continued bruit and thrill post treatment.    Report given to Ana UREÑA  Refer to flowsheet and MAR for details.  Patient transported back in bed from Dialysis to primary unit. Departed at 1926.

## 2023-03-20 NOTE — PLAN OF CARE
Sw informed Pt ok to [place] and return to [facility], room is 209b and number for nurse to call report to is . Briana setup transport for 630pm.

## 2023-03-20 NOTE — ASSESSMENT & PLAN NOTE
Outpatient HD Information:    -Outpatient HD unit: [facility]   -HD tx days: MWF   -HD tx time: 4hrs   -Last HD tx prior to presentation: 3/15/23  -HD access: LUE AVF   -HD modality: iHD   -Residual urine: Anuric       Plan/Recommendations:    -HD today 3/20 prior to dsicharge to resume MWF schedule   -Renal diet, if not NPO   -Strict I/O's and daily weights  -Daily renal function panels   -Renally dose meds

## 2023-03-20 NOTE — PLAN OF CARE
APPOINTMENT:    Patient Appointment(s) scheduled with  Ninfa Holliday PA-C, on Monday Mar 27, 2023 1:30 PM

## 2023-03-20 NOTE — PROGRESS NOTES
[facility] - [facility]  Nephrology  Progress Note    Patient Name: Cosme Lewis  MRN: [id number]  Admission Date: 3/16/2023  Hospital Length of Stay: 3 days  Attending Provider: Guerita Carter DO   Primary Care Physician: Primary Doctor No  Principal Problem:Acute on chronic respiratory failure with hypoxia    Subjective:       Interval History: Possible discharge to NH today. Plan for HD prior to discharge. On 4L NC. SBP 130s-150s    Review of patient's allergies indicates:  No Known Allergies  Current Facility-Administered Medications   Medication Frequency    0.9%  NaCl infusion Once    albuterol-ipratropium 2.5 mg-0.5 mg/3 mL nebulizer solution 3 mL Q4H PRN    apixaban tablet 5 mg BID    artificial tears 0.5 % ophthalmic solution 1 drop 6x Daily    aspirin EC tablet 81 mg Daily    atorvastatin tablet 40 mg Daily    carvediloL tablet 6.25 mg BID    cloNIDine 0.2 mg/24 hr td ptwk 1 patch ED 1 Time    [START ON 3/24/2023] cloNIDine 0.2 mg/24 hr td ptwk 1 patch Q7 Days    dextrose 10% bolus 125 mL 125 mL PRN    dextrose 10% bolus 250 mL 250 mL PRN    dextrose 40 % gel 15,000 mg PRN    dextrose 40 % gel 30,000 mg PRN    FLUoxetine capsule 40 mg Daily    fluticasone furoate-vilanteroL 100-25 mcg/dose diskus inhaler 1 puff Daily    glucagon (human recombinant) injection 1 mg PRN    hydrALAZINE tablet 100 mg Q8H    insulin aspart U-100 pen 0-5 Units QID (AC + HS) PRN    insulin aspart U-100 pen 5 Units TIDWM    insulin detemir U-100 pen 6 Units BID    isosorbide mononitrate 24 hr tablet 30 mg BID    melatonin tablet 6 mg Nightly PRN    mupirocin 2 % ointment BID    naloxone 0.4 mg/mL injection 0.02 mg PRN    NIFEdipine 24 hr tablet 90 mg Daily    sodium chloride 0.9% flush 10 mL PRN    sodium chloride 0.9% flush 10 mL Q12H PRN    tiotropium bromide 2.5 mcg/actuation inhaler 2 puff Daily    vitamin renal formula (B-complex-vitamin c-folic acid) 1 mg per capsule 1 capsule Daily    vits A and  D-white pet-lanolin ointment BID    white petrolatum-mineral oil 57.3-42.5% ophthalmic ointment QHS       Objective:     Vital Signs (Most Recent):  Temp: 97 °F (36.1 °C) (03/20/23 1114)  Pulse: 60 (03/20/23 1114)  Resp: 18 (03/20/23 1114)  BP: 137/70 (03/20/23 1114)  SpO2: (!) 93 % (03/20/23 1114)   Vital Signs (24h Range):  Temp:  [96.1 °F (35.6 °C)-98.2 °F (36.8 °C)] 97 °F (36.1 °C)  Pulse:  [60-69] 60  Resp:  [17-18] 18  SpO2:  [92 %-95 %] 93 %  BP: (102-159)/(58-79) 137/70     Weight: 70.8 kg (156 lb) (03/16/23 2336)  Body mass index is 23.04 kg/m².  Body surface area is 1.86 meters squared.    I/O last 3 completed shifts:  In: 240 [P.O.:240]  Out: 0     Physical Exam  Vitals and nursing note reviewed.   Constitutional:       General: He is not in acute distress.     Appearance: Normal appearance. He is ill-appearing (chronically). He is not diaphoretic.   HENT:      Head: Normocephalic and atraumatic.      Right Ear: External ear normal.      Left Ear: External ear normal.      Nose: Nose normal.      Comments: NC in place, no bleeding from nares appreciated     Mouth/Throat:      Mouth: Mucous membranes are moist.   Eyes:      General: No scleral icterus.        Right eye: No discharge.         Left eye: No discharge.      Extraocular Movements: Extraocular movements intact.      Comments: Left eye with subconjunctival hemorrhage   Cardiovascular:      Rate and Rhythm: Normal rate and regular rhythm.      Heart sounds: Normal heart sounds. No murmur heard.  Pulmonary:      Effort: Pulmonary effort is normal. No respiratory distress.      Breath sounds: Rales present. No wheezing.   Abdominal:      General: There is distension.      Palpations: Abdomen is soft. There is no mass.      Tenderness: There is no abdominal tenderness.   Genitourinary:     Comments: Skin breakdown to penis  Musculoskeletal:         General: No swelling or deformity. Normal range of motion.      Cervical back: Normal range of motion  and neck supple.      Right lower leg: No edema.      Left lower leg: No edema.   Skin:     General: Skin is warm.      Coloration: Skin is not jaundiced.      Findings: No bruising.   Neurological:      Mental Status: He is alert and oriented to person, place, and time.   Psychiatric:         Mood and Affect: Mood normal.         Behavior: Behavior normal.         Thought Content: Thought content normal.       Significant Labs:  CBC:   Recent Labs   Lab 03/20/23  0503   WBC 3.99   RBC 2.56*   HGB 7.5*   HCT 23.5*      MCV 92   MCH 29.3   MCHC 31.9*     CMP:   Recent Labs   Lab 03/18/23  0832 03/19/23  0441 03/20/23  0503   *   < > 235*   CALCIUM 8.1*   < > 8.4*   ALBUMIN 2.5*   < > 2.7*   PROT 5.4*  --   --    *   < > 131*   K 5.1   < > 4.4   CO2 25   < > 25   CL 96   < > 97   BUN 66*   < > 54*   CREATININE 4.4*   < > 4.6*   ALKPHOS 167*  --   --    ALT 18  --   --    AST 15  --   --    BILITOT 0.7  --   --     < > = values in this interval not displayed.     All labs within the past 24 hours have been reviewed.         Assessment/Plan:     Pulmonary  * Acute on chronic respiratory failure with hypoxia  -Management per primary     Renal/  Chronic kidney disease-mineral and bone disorder  -Low phos diet   -No indication for phos binders at this time   -Trend phos levels with daily labs     ESRD (end stage renal disease)  Outpatient HD Information:    -Outpatient HD unit: [facility]   -HD tx days: MWF   -HD tx time: 4hrs   -Last HD tx prior to presentation: 3/15/23  -HD access: LUE AVF   -HD modality: iHD   -Residual urine: Anuric       Plan/Recommendations:    -HD today 3/20 prior to dsicharge to resume MWF schedule   -Renal diet, if not NPO   -Strict I/O's and daily weights  -Daily renal function panels   -Renally dose meds      Oncology  Anemia in ESRD (end-stage renal disease)  -Target Hg of 10-12  -Transfuse for Hg <7.0  -epo with HD         Thank you for your consult. I will follow-up with  patient. Please contact us if you have any additional questions.    Citlalli Simpson DNP  Nephrology  [facility]

## 2023-03-20 NOTE — SUBJECTIVE & OBJECTIVE
Interval History: Possible discharge to NH today. Plan for HD prior to discharge. On 4L NC.     Review of patient's allergies indicates:  No Known Allergies  Current Facility-Administered Medications   Medication Frequency    0.9%  NaCl infusion Once    albuterol-ipratropium 2.5 mg-0.5 mg/3 mL nebulizer solution 3 mL Q4H PRN    apixaban tablet 5 mg BID    artificial tears 0.5 % ophthalmic solution 1 drop 6x Daily    aspirin EC tablet 81 mg Daily    atorvastatin tablet 40 mg Daily    carvediloL tablet 6.25 mg BID    cloNIDine 0.2 mg/24 hr td ptwk 1 patch ED 1 Time    [START ON 3/24/2023] cloNIDine 0.2 mg/24 hr td ptwk 1 patch Q7 Days    dextrose 10% bolus 125 mL 125 mL PRN    dextrose 10% bolus 250 mL 250 mL PRN    dextrose 40 % gel 15,000 mg PRN    dextrose 40 % gel 30,000 mg PRN    FLUoxetine capsule 40 mg Daily    fluticasone furoate-vilanteroL 100-25 mcg/dose diskus inhaler 1 puff Daily    glucagon (human recombinant) injection 1 mg PRN    hydrALAZINE tablet 100 mg Q8H    insulin aspart U-100 pen 0-5 Units QID (AC + HS) PRN    insulin aspart U-100 pen 5 Units TIDWM    insulin detemir U-100 pen 6 Units BID    isosorbide mononitrate 24 hr tablet 30 mg BID    melatonin tablet 6 mg Nightly PRN    mupirocin 2 % ointment BID    naloxone 0.4 mg/mL injection 0.02 mg PRN    NIFEdipine 24 hr tablet 90 mg Daily    sodium chloride 0.9% flush 10 mL PRN    sodium chloride 0.9% flush 10 mL Q12H PRN    tiotropium bromide 2.5 mcg/actuation inhaler 2 puff Daily    vitamin renal formula (B-complex-vitamin c-folic acid) 1 mg per capsule 1 capsule Daily    vits A and D-white pet-lanolin ointment BID    white petrolatum-mineral oil 57.3-42.5% ophthalmic ointment QHS       Objective:     Vital Signs (Most Recent):  Temp: 97 °F (36.1 °C) (03/20/23 1114)  Pulse: 60 (03/20/23 1114)  Resp: 18 (03/20/23 1114)  BP: 137/70 (03/20/23 1114)  SpO2: (!) 93 % (03/20/23 1114)   Vital Signs (24h Range):  Temp:  [96.1 °F (35.6 °C)-98.2 °F (36.8 °C)]  97 °F (36.1 °C)  Pulse:  [60-69] 60  Resp:  [17-18] 18  SpO2:  [92 %-95 %] 93 %  BP: (102-159)/(58-79) 137/70     Weight: 70.8 kg (156 lb) (03/16/23 2336)  Body mass index is 23.04 kg/m².  Body surface area is 1.86 meters squared.    I/O last 3 completed shifts:  In: 240 [P.O.:240]  Out: 0     Physical Exam  Vitals and nursing note reviewed.   Constitutional:       General: He is not in acute distress.     Appearance: Normal appearance. He is ill-appearing (chronically). He is not diaphoretic.   HENT:      Head: Normocephalic and atraumatic.      Right Ear: External ear normal.      Left Ear: External ear normal.      Nose: Nose normal.      Comments: NC in place, no bleeding from nares appreciated     Mouth/Throat:      Mouth: Mucous membranes are moist.   Eyes:      General: No scleral icterus.        Right eye: No discharge.         Left eye: No discharge.      Extraocular Movements: Extraocular movements intact.      Comments: Left eye with subconjunctival hemorrhage   Cardiovascular:      Rate and Rhythm: Normal rate and regular rhythm.      Heart sounds: Normal heart sounds. No murmur heard.  Pulmonary:      Effort: Pulmonary effort is normal. No respiratory distress.      Breath sounds: Rales present. No wheezing.   Abdominal:      General: There is distension.      Palpations: Abdomen is soft. There is no mass.      Tenderness: There is no abdominal tenderness.   Genitourinary:     Comments: Skin breakdown to penis  Musculoskeletal:         General: No swelling or deformity. Normal range of motion.      Cervical back: Normal range of motion and neck supple.      Right lower leg: No edema.      Left lower leg: No edema.   Skin:     General: Skin is warm.      Coloration: Skin is not jaundiced.      Findings: No bruising.   Neurological:      Mental Status: He is alert and oriented to person, place, and time.   Psychiatric:         Mood and Affect: Mood normal.         Behavior: Behavior normal.          Thought Content: Thought content normal.       Significant Labs:  CBC:   Recent Labs   Lab 03/20/23  0503   WBC 3.99   RBC 2.56*   HGB 7.5*   HCT 23.5*      MCV 92   MCH 29.3   MCHC 31.9*     CMP:   Recent Labs   Lab 03/18/23  0832 03/19/23  0441 03/20/23  0503   *   < > 235*   CALCIUM 8.1*   < > 8.4*   ALBUMIN 2.5*   < > 2.7*   PROT 5.4*  --   --    *   < > 131*   K 5.1   < > 4.4   CO2 25   < > 25   CL 96   < > 97   BUN 66*   < > 54*   CREATININE 4.4*   < > 4.6*   ALKPHOS 167*  --   --    ALT 18  --   --    AST 15  --   --    BILITOT 0.7  --   --     < > = values in this interval not displayed.     All labs within the past 24 hours have been reviewed.

## 2023-03-20 NOTE — PROGRESS NOTES
Patient arrived in a bed to dialysis unit.   Report received from Ana NEAL's per Doc Flowsheet.    ESRD on outpatient MWF schedule.  Maintenance Hemodialysis initiated using the following:    Dialysis Access: UMA AVF    Needle size: 15 gauge X2  Insertion with no complications.    Will Maintain telemetry and blood pressure monitoring throughout treatment.  Refer to flowsheet and MAR for details.

## 2023-03-20 NOTE — DISCHARGE SUMMARY
[facility] Medicine  Discharge Summary      Patient Name: Cosme Lewis  MRN: [id number]  MARCY: [id number]  Patient Class: IP- Inpatient  Admission Date: 3/16/2023  Hospital Length of Stay: 3 days  Discharge Date and Time:  03/20/2023 6:25 PM  Attending Physician: Guerita Carter DO   Discharging Provider: Cecilia Dorman MD  Primary Care Provider: Primary Doctor [facility] Medicine Team: [facility] 5 Cecilia Dorman MD  Primary Care Team: [facility] 5    HPI:   Cosme Lewis is a 57 y.o. with ESRD on HD MWF, DM1, renovascular HTN, Chronic respiratory failure (on 3L of home O2), HFpEF, and malnutrition who presents to the ED for shortness of breath and epistaxis. He denies any nose bleeds on our encounter. He does endorse difficulty breathing but denies chest pain. He reports a penile injury 2/2 a condom catheter and has pain from the area. Denies fever, chills, diarrhea, cough, and dysuria. He still produces urine and his last complete session of HD was on 3/14 while admitted for volume overload from a missed HD session.       Of note, patient recently hospitalized at [facility] 3/10-3/15 for volume overload 2/2 missed HD session with periorbital edema, acute on chronic hypoxic respiratory failure, and hospital course that was complicated by bleeding AVF. He received a course of CAP coverage with CTX and doxycycline due to c/f an aspiration event. He was dc'd back to [facility] on his home O2.     In the ED, patient arrived hypertensive to 191/86 and satting 95% on 6LNC. Patient subsequently desatted to 84% and was placed on 15L HFNC with improvement to mid 90's%. Labs remarkable for hyperkalemia to 6, bicarb of 20, glucose 462, BNP 4802, and troponin 0.033. CXR with significant evidence for pulmonary edema and pleural effusions L>R. EKG without hyperK changes. Nephrology was consulted in the ED for emergent HD. He was medically shifted with lasix, insulin, and lokelma. He was started  on vanc and cefepime for CAP coverage.      Patient admitted to hospital medicine.    Hospital Course:   Readmitted to hospital medicine following discharge on 3/15 for acute on chronic hypoxic respiratory failure likely 2/2 to missed HD session and flash pulmonary edema. Patient was initially on 10 L HFNC which improved to his baseline oxygen requirement following HD. Hospital course c/b labile blood sugars with numbers ranging from 400s to 30 within a day. Left eye with redness and pain with blinking - ophthalmology recommending artificial tears and ointment and will f/u patient in clinic. On 3/18, patient noted to have a low Hgb at 6.9 and received 1 unit of pRBC's with subsequent stabilization of H/H. Patient on 4L NC still some SOB, will continue to wean down supplemental O2. TTE on 3/20/23 demonstrated HFpEF (55%) 2/2 grade II diastolic dysfunction and CVP 15 with persistent pulmonary hypertension. Patient dialyzed on 3/20 and is now back on his MWF HD schedule. He is medically ready for dc back to [facility] with close PCP, palliative medicine, and ophthalmology f/u.     Interval History: NAEON. Patient with oozing epistaxis from bilateral nares. On 4L nasal cannula with SpO2 93+%. Endorses SOB. Will be dialyzed today and dc'd to his NH.    Review of Systems   Constitutional:  Negative for chills and fever.   HENT:  Negative for nosebleeds and sore throat.    Respiratory:  Positive for shortness of breath. Negative for cough.    Cardiovascular:  Negative for chest pain and leg swelling.   Gastrointestinal:  Negative for abdominal pain, diarrhea, nausea and vomiting.   Genitourinary:  Positive for decreased urine volume (chronically oliguric). Negative for dysuria.   Neurological:  Negative for dizziness and light-headedness.   Objective:     Vital Signs (Most Recent):  Temp: 97.2 °F (36.2 °C) (03/20/23 1507)  Pulse: 60 (03/20/23 1530)  Resp: 18 (03/20/23 1507)  BP: 128/69 (03/20/23 1530)  SpO2:  (!) 93 % (03/20/23 1114)   Vital Signs (24h Range):  Temp:  [96.1 °F (35.6 °C)-98.2 °F (36.8 °C)] 97.2 °F (36.2 °C)  Pulse:  [59-69] 60  Resp:  [17-18] 18  SpO2:  [92 %-95 %] 93 %  BP: (102-159)/(58-79) 128/69     Weight: 70.8 kg (156 lb)  Body mass index is 23.04 kg/m².    Intake/Output Summary (Last 24 hours) at 3/20/2023 1547  Last data filed at 3/20/2023 0620  Gross per 24 hour   Intake 240 ml   Output 0 ml   Net 240 ml        Physical Exam  Vitals and nursing note reviewed.   Constitutional:       General: He is not in acute distress.     Appearance: Normal appearance. He is ill-appearing (chronically). He is not diaphoretic.   HENT:      Head: Normocephalic and atraumatic.      Right Ear: External ear normal.      Left Ear: External ear normal.      Nose: Nose normal.      Comments: NC in place  Oozing epistaxis from bilateral nares     Mouth/Throat:      Mouth: Mucous membranes are moist.   Eyes:      General: No scleral icterus.        Right eye: No discharge.         Left eye: No discharge.      Extraocular Movements: Extraocular movements intact.      Comments: Left eye with subconjunctival hemorrhage   Cardiovascular:      Rate and Rhythm: Normal rate and regular rhythm.      Heart sounds: Normal heart sounds. No murmur heard.  Pulmonary:      Effort: Pulmonary effort is normal. No respiratory distress.      Breath sounds: Rales present. No wheezing.   Abdominal:      General: There is distension.      Palpations: Abdomen is soft. There is no mass.      Tenderness: There is no abdominal tenderness.   Genitourinary:     Comments: Skin breakdown to penis  Musculoskeletal:         General: No swelling or deformity. Normal range of motion.      Cervical back: Normal range of motion and neck supple.      Right lower leg: No edema.      Left lower leg: No edema.   Skin:     General: Skin is warm.      Coloration: Skin is not jaundiced.      Findings: No bruising.   Neurological:      Mental Status: He is alert  and oriented to person, place, and time.   Psychiatric:         Mood and Affect: Mood normal.         Behavior: Behavior normal.         Thought Content: Thought content normal.       Significant Labs: All pertinent labs within the past 24 hours have been reviewed.    Significant Imaging: I have reviewed all pertinent imaging results/findings within the past 24 hours.      Goals of Care Treatment Preferences:  Code Status: Full Code      Consults:   Consults (From admission, onward)        Status Ordering Provider     Inpatient consult to Palliative Care  Once        Provider:  (Not yet assigned)    Completed SOFIA MANCUSO     Inpatient consult to PICC team (NIAS)  Once        Provider:  (Not yet assigned)    Completed ODESSA BENNETT     Inpatient consult to Ophthalmology  Once        Provider:  (Not yet assigned)    Completed ANATOLY HERNANDEZ     Inpatient consult to Nephrology  Once        Provider:  (Not yet assigned)    Completed NICOLE POLLOCK          No new Assessment & Plan notes have been filed under this hospital service since the last note was generated.  Service: Hospital Medicine    Final Active Diagnoses:    Diagnosis Date Noted POA    PRINCIPAL PROBLEM:  Acute on chronic respiratory failure with hypoxia [J96.21] 10/12/2022 Yes    Subconjunctival hemorrhage of left eye [H11.32] 03/19/2023 Unknown    Hyperkalemia [E87.5] 03/17/2023 Unknown    Penis abrasion, initial encounter [S30.812A] 03/17/2023 Unknown    Type 2 diabetes mellitus with hyperglycemia, with long-term current use of insulin [E11.65, Z79.4] 03/07/2023 Not Applicable     Chronic    Hypertensive urgency [I16.0] 11/21/2022 Yes    Anemia in ESRD (end-stage renal disease) [N18.6, D63.1] 11/18/2022 Yes     Chronic    Renovascular hypertension [I15.0] 11/18/2022 Yes     Chronic    HLD (hyperlipidemia) [E78.5] 11/18/2022 Yes     Chronic    Multiple subsegmental pulmonary emboli without acute cor pulmonale [I26.94] 10/13/2022  Yes     Chronic    Chronic kidney disease-mineral and bone disorder [N18.9, E83.9, M89.9] 10/12/2022 Yes    ESRD (end stage renal disease) [N18.6]  Yes      Problems Resolved During this Admission:       Discharged Condition: poor    Disposition: nursing home Nursing Home    Follow Up:   Follow-up Information     [facility] Follow up today.    Specialties: Nursing Home Agency, SNF Agency  Contact information:  [address] [facility] [address] [phone]             [facility]. Call in 1 week(s).    Specialty: Internal Medicine  Why: post hopsital discharge follow up  Contact information:  [address]  [facility] [address] [phone]  Additional information:  [facility]   Please park in surface lot and check in at central registration desk           [facility]. Call in 1 week(s).    Specialty: Palliative Medicine  Why: goals of care and advanced care planning  Contact information:  [address]  [facility] [address] [phone]  Additional information:  Main Building, 9th Floor   Please park in [facility] and use Clinic elevator                     Patient Instructions:      Ambulatory referral/consult to Ophthalmology   Standing Status: Future   Referral Priority: Routine Referral Type: Consultation   Referral Reason: Specialty Services Required   Requested Specialty: Ophthalmology   Number of Visits Requested: 1     Ambulatory referral/consult to CLINIC Palliative Care   Standing Status: Future   Referral Priority: Routine Referral Type: Consultation   Requested Specialty: Palliative Medicine   Number of Visits Requested: 1       Significant Diagnostic Studies: Labs: All labs within the past 24 hours have been reviewed    Pending Diagnostic Studies:     None         Medications:  Reconciled Home Medications:      Medication List      START taking these medications    artificial  tears 0.5 % ophthalmic solution  Commonly known as: ISOPTO TEARS  Place 1 drop into both eyes 6 (six) times daily.     vits A and D-white pet-lanolin ointment  Apply topically 2 (two) times daily. Apply twice daily to penis        CHANGE how you take these medications    atorvastatin 40 MG tablet  Commonly known as: LIPITOR  Take 1 tablet (40 mg total) by mouth once daily.  What changed: when to take this     insulin lispro 100 unit/mL injection  Commonly known as: HumaLOG U-100 Insulin  Inject 5 Units into the skin 3 (three) times daily before meals.  What changed: additional instructions        CONTINUE taking these medications    acetaminophen 650 MG Tbsr  Commonly known as: TYLENOL  Take 650 mg by mouth every 8 (eight) hours as needed (pain or fever over 100.4).     albuterol 90 mcg/actuation inhaler  Commonly known as: PROVENTIL/VENTOLIN HFA  Inhale 2 puffs into the lungs 4 (four) times daily as needed (breathing).     albuterol-ipratropium 2.5 mg-0.5 mg/3 mL nebulizer solution  Commonly known as: DUO-NEB  Take 3 mLs by nebulization every 4 (four) hours while awake. Rescue     apixaban 5 mg Tab  Commonly known as: ELIQUIS  Take 5 mg by mouth 2 (two) times daily.     aspirin 81 MG EC tablet  Commonly known as: ECOTRIN  Take 81 mg by mouth once daily.     carvediloL 3.125 MG tablet  Commonly known as: COREG  Take 2 tablets (6.25 mg total) by mouth 2 (two) times daily.     cetirizine 10 MG tablet  Commonly known as: ZYRTEC  Take 10 mg by mouth daily as needed (itching).     cloNIDine 0.3 mg/24 hr td ptwk 0.3 mg/24 hr  Commonly known as: CATAPRES  Place 1 patch onto the skin every 7 days.     epoetin xavier 10,000 unit/mL injection  Commonly known as: PROCRIT  Inject 0.7 mLs (7,000 Units total) into the skin every Tues, Thurs, Sat.     erythromycin ophthalmic ointment  Commonly known as: ROMYCIN  Place a 1/2 inch ribbon of ointment into the lower eyelid three times a day.     FLUoxetine 40 MG capsule  Take 40 mg by  mouth once daily.     fluticasone-salmeterol 250-50 mcg/dose 250-50 mcg/dose diskus inhaler  Commonly known as: ADVAIR  Inhale 1 puff into the lungs 2 (two) times daily.     GLUCAGON EMERGENCY KIT (HUMAN) 1 mg Solr  Generic drug: glucagon  1 mg into the muscle as needed if CBG < 70     hydrALAZINE 100 MG tablet  Commonly known as: APRESOLINE  Take 1 tablet (100 mg total) by mouth every 8 (eight) hours.     HYDROPHILIC CREAM TOP  Apply liberal amount to affected area twice daily for dry skin     insulin detemir U-100 100 unit/mL (3 mL) Inpn pen  Commonly known as: Levemir FLEXTOUCH  Inject 7 Units into the skin 2 (two) times daily.     isosorbide mononitrate 30 MG 24 hr tablet  Commonly known as: IMDUR  Take 30 mg by mouth 2 (two) times daily.     melatonin 3 mg Tbdl  Take 9 mg by mouth nightly as needed (sleep).     NEPRO CARB STEADY ORAL  Give 1 carton by mouth with each meal.     NIFEdipine 90 MG (OSM) 24 hr tablet  Commonly known as: PROCARDIA-XL  Take 1 tablet (90 mg total) by mouth once daily.     sodium chloride 0.65 % nasal spray  Commonly known as: OCEAN  2 sprays by Nasal route every 4 (four) hours as needed for Congestion.     tiotropium bromide 2.5 mcg/actuation inhaler  Commonly known as: SPIRIVA RESPIMAT  Inhale 2 puffs into the lungs Daily.     triamcinolone acetonide 0.025 % Lotn  Apply to the affected area twice daily     vitamin renal formula (B-complex-vitamin c-folic acid) 1 mg Cap  Commonly known as: NEPHROCAP  Take 1 capsule by mouth once daily.        STOP taking these medications    doxycycline 100 MG tablet  Commonly known as: VIBRA-TABS            Indwelling Lines/Drains at time of discharge:   Lines/Drains/Airways     Drain  Duration                Hemodialysis AV Fistula Left upper arm -- days                Time spent on the discharge of patient: 60 minutes         Cecilia Dorman MD  Department of Hospital Medicine  [facility]

## 2023-03-20 NOTE — PLAN OF CARE
Received call from Ana Robles RN, patient is still in dialysis. Scheduled to be picked up by EMS at 6:30 PM. CM contacted [facility] dispatch and requested pickup time be pushed back to 7:30 PM. Updated bedside RN.      Fariba Dickson RN  On-Call  - [facility]  Pager: [phone]

## 2023-03-21 NOTE — NURSING
Sent patient with the ambulance to return to [facility].  Patient has no complaints voiced or any signs of distress.

## 2023-03-22 PROBLEM — R04.0 EPISTAXIS: Status: ACTIVE | Noted: 2023-01-01

## 2023-03-22 PROBLEM — I27.20 PULMONARY HYPERTENSION: Chronic | Status: RESOLVED | Noted: 2022-01-01 | Resolved: 2023-01-01

## 2023-03-22 NOTE — ASSESSMENT & PLAN NOTE
Epistaxis on admission, Hgb stable and bleeding now controlled.      -- CBC daily  -- Type and screen  -- Transfuse for Hgb <7  -- Epoetin T, TH, Sat

## 2023-03-22 NOTE — ASSESSMENT & PLAN NOTE
Patient with Hypoxic Respiratory failure which is Acute on chronic.  he is on home oxygen at 3 LPM. Supplemental oxygen was provided and noted-  .   Signs/symptoms of respiratory failure include- tachypnea and increased work of breathing. Contributing diagnoses includes - COPD Labs and images were reviewed. Patient Has recent ABG, which has been reviewed. Will treat underlying causes and adjust management of respiratory failure as follows:  Chest xray with appearance of volume overload; rales on exam. Stable on 4L nasal cannula in the ED.    --Nephrology consulted for HD; volume removal  --BP control; Cardene drip  --Wean FiO2 for SpO2>88%  --Home inhalers; PRN nebs

## 2023-03-22 NOTE — SUBJECTIVE & OBJECTIVE
Past Medical History:   Diagnosis Date    Chronic kidney disease-mineral and bone disorder 10/12/2022    COPD (chronic obstructive pulmonary disease)     Diabetes mellitus type 1     ESRD on dialysis     Hypertension     Hypervolemia 2/22/2023    Hyponatremia 3/4/2023    SOB (shortness of breath) 10/12/2022    Patient with history COPD treated with Ellipta the albuterol, fluticasone-salmeterol tachypneic in the ED saturating 94% and 6 L on nasal cannula, patient does not know how many  he uses it is in nursing home.    -duo nebs Q 4  Given that patient is a poor historian, and in the setting of left lower extremity edema and history of coagulopathy PE cannot be ruled out.  Will workup for possible infec       Past Surgical History:   Procedure Laterality Date    AV FISTULA PLACEMENT         Review of patient's allergies indicates:  No Known Allergies    Family History       Problem Relation (Age of Onset)    Diabetes Mother          Tobacco Use    Smoking status: Never    Smokeless tobacco: Never   Substance and Sexual Activity    Alcohol use: Not Currently    Drug use: Not on file    Sexual activity: Not Currently      Review of Systems   HENT:  Positive for nosebleeds.    Respiratory:  Positive for shortness of breath. Negative for wheezing.    Cardiovascular:  Negative for chest pain and palpitations.   Gastrointestinal:  Positive for abdominal pain and nausea.   Objective:     Vital Signs (Most Recent):  Temp: 97.7 °F (36.5 °C) (03/22/23 0943)  Pulse: 77 (03/22/23 1344)  Resp: 15 (03/22/23 1344)  BP: (!) 214/95 (03/22/23 1436)  SpO2: (!) 92 % (03/22/23 1344)   Vital Signs (24h Range):  Temp:  [97.7 °F (36.5 °C)-98.2 °F (36.8 °C)] 97.7 °F (36.5 °C)  Pulse:  [60-94] 77  Resp:  [14-19] 15  SpO2:  [92 %-98 %] 92 %  BP: (139-240)/() 214/95   Weight: 70.3 kg (155 lb)  Body mass index is 22.89 kg/m².    No intake or output data in the 24 hours ending 03/22/23 1453    Physical Exam  Vitals and nursing note  reviewed.   Constitutional:       General: He is not in acute distress.     Appearance: He is ill-appearing.      Comments: Chronically ill appearing   HENT:      Mouth/Throat:      Mouth: Mucous membranes are dry.   Eyes:      Pupils: Pupils are equal, round, and reactive to light.   Cardiovascular:      Rate and Rhythm: Normal rate and regular rhythm.      Pulses: Normal pulses.   Pulmonary:      Effort: Pulmonary effort is normal. No respiratory distress.      Breath sounds: Rales present. No wheezing.   Abdominal:      General: Bowel sounds are normal. There is no distension.      Palpations: Abdomen is soft.      Tenderness: There is abdominal tenderness.   Musculoskeletal:      Cervical back: Neck supple.      Right lower leg: No edema.      Left lower leg: No edema.   Skin:     General: Skin is warm and dry.      Capillary Refill: Capillary refill takes less than 2 seconds.   Neurological:      General: No focal deficit present.      Mental Status: He is alert. Mental status is at baseline.   Psychiatric:         Mood and Affect: Affect is tearful.         Speech: Speech is tangential.         Behavior: Behavior is cooperative.       Vents:     Lines/Drains/Airways       Drain  Duration                  Hemodialysis AV Fistula Left upper arm -- days              Peripheral Intravenous Line  Duration                  Peripheral IV - Single Lumen 03/22/23 1032 22 G Posterior;Right Hand <1 day         Peripheral IV - Single Lumen 03/22/23 1033 22 G Distal;Left;Posterior Forearm <1 day                  Significant Labs:    CBC/Anemia Profile:  Recent Labs   Lab 03/21/23 2038 03/22/23  1018   WBC 4.72 5.02   HGB 7.7* 7.5*   HCT 24.4* 23.9*    197   MCV 93 94   RDW 14.9* 15.2*        Chemistries:  Recent Labs   Lab 03/21/23 2038 03/22/23  1018 03/22/23  1341   * 131* 131*   K 4.5 4.8 5.1    99 100   CO2 23 21* 19*   BUN 45* 53* 58*   CREATININE 4.0* 4.6* 4.6*   CALCIUM 8.4* 8.3* 8.4*   ALBUMIN  2.8* 2.9*  --    PROT 6.2 6.3  --    BILITOT 0.7 0.8  --    ALKPHOS 205* 202*  --    ALT 25 23  --    AST 29 24  --    MG  --   --  2.1   PHOS  --   --  4.6*  4.6*       All pertinent labs within the past 24 hours have been reviewed.    Significant Imaging: I have reviewed all pertinent imaging results/findings within the past 24 hours.

## 2023-03-22 NOTE — HPI
Cosme Lewis is a 57 year old male presents with recurrent epistaxis and SOB x 2 days. PMHx of hypertension, type 1 diabetes, COPD, HFpEF on 3 L home O2 and ESRD on hemodialysis (M/W/F) who presented to the ED for complaints of epistaxis x 1 day.On presentation to the ED: CXR with bilateral edema. Last HD on 3/20/23. K. 5.1. SBP 240s and patient started on cardene gtt. Nephrology consulted for ESRD on HD

## 2023-03-22 NOTE — ASSESSMENT & PLAN NOTE
Hyperglycemic on admission without elevated AG, BHB neg.  Not in DKA.      -- HbA1c on 3/6 9.5  -- Give long acting insulin now  -- Start insulin gtt per protocol with q1h accuchecks while on the drip.    -- Monitor electrolytes with BMP q4h while on insulin gtt and place on tele  -- Hold insulin gtt for K < 3.3

## 2023-03-22 NOTE — HPI
Mr. Cosme Lewis is 57 y.o. man with a history of ESRD on HD, DM, HTN, and COPD who [place] [facility] [place]22 with complaints of shortness of breath and recurrent epistaxis.  He was in the ED the day prior with epistaxis that resolved and was discharged home. Found to be hyperglycemic without AG elevation or elevated BHB.  Hypertensive with SBP up to 240's and started on cardene gtt.  Critical care medicine consulted due to hypertensive emergency and hyperglycemia and patient admitted to MICU.

## 2023-03-22 NOTE — H&P
[facility] - Emergency Dept  Critical Care Medicine  History & Physical    Patient Name: Cosme Lewis  MRN: [id number]  Admission Date: 3/22/2023  Hospital Length of Stay: 0 days  Code Status: Full Code  Attending Physician: Marquis Fernández MD   Primary Care Provider: Primary Doctor No   Principal Problem: Hypertensive emergency    Subjective:     HPI:  Mr. Cosme Lewis is 57 y.o. man with a history of ESRD on HD, DM, HTN, and COPD who [place] [facility] [place] with complaints of shortness of breath and recurrent epistaxis.  He was in the ED the day prior with epistaxis that resolved and was discharged home. Found to be hyperglycemic without AG elevation or elevated BHB.  Hypertensive with SBP up to 240's and started on cardene gtt.  Critical care medicine consulted due to hypertensive emergency and hyperglycemia and patient admitted to MICU.        Hospital/ICU Course:  No notes on file     Past Medical History:   Diagnosis Date    Chronic kidney disease-mineral and bone disorder 10/12/2022    COPD (chronic obstructive pulmonary disease)     Diabetes mellitus type 1     ESRD on dialysis     Hypertension     Hypervolemia 2/22/2023    Hyponatremia 3/4/2023    SOB (shortness of breath) 10/12/2022    Patient with history COPD treated with Ellipta the albuterol, fluticasone-salmeterol tachypneic in the ED saturating 94% and 6 L on nasal cannula, patient does not know how many  he uses it is in nursing home.    -duo nebs Q 4  Given that patient is a poor historian, and in the setting of left lower extremity edema and history of coagulopathy PE cannot be ruled out.  Will workup for possible infec       Past Surgical History:   Procedure Laterality Date    AV FISTULA PLACEMENT         Review of patient's allergies indicates:  No Known Allergies    Family History       Problem Relation (Age of Onset)    Diabetes Mother          Tobacco Use    Smoking status: Never    Smokeless tobacco: Never   Substance and Sexual  Activity    Alcohol use: Not Currently    Drug use: Not on file    Sexual activity: Not Currently      Review of Systems   HENT:  Positive for nosebleeds.    Respiratory:  Positive for shortness of breath. Negative for wheezing.    Cardiovascular:  Negative for chest pain and palpitations.   Gastrointestinal:  Positive for abdominal pain and nausea.   Objective:     Vital Signs (Most Recent):  Temp: 97.7 °F (36.5 °C) (03/22/23 0943)  Pulse: 77 (03/22/23 1344)  Resp: 15 (03/22/23 1344)  BP: (!) 214/95 (03/22/23 1436)  SpO2: (!) 92 % (03/22/23 1344)   Vital Signs (24h Range):  Temp:  [97.7 °F (36.5 °C)-98.2 °F (36.8 °C)] 97.7 °F (36.5 °C)  Pulse:  [60-94] 77  Resp:  [14-19] 15  SpO2:  [92 %-98 %] 92 %  BP: (139-240)/() 214/95   Weight: 70.3 kg (155 lb)  Body mass index is 22.89 kg/m².    No intake or output data in the 24 hours ending 03/22/23 1453    Physical Exam  Vitals and nursing note reviewed.   Constitutional:       General: He is not in acute distress.     Appearance: He is ill-appearing.      Comments: Chronically ill appearing   HENT:      Mouth/Throat:      Mouth: Mucous membranes are dry.   Eyes:      Pupils: Pupils are equal, round, and reactive to light.   Cardiovascular:      Rate and Rhythm: Normal rate and regular rhythm.      Pulses: Normal pulses.   Pulmonary:      Effort: Pulmonary effort is normal. No respiratory distress.      Breath sounds: Rales present. No wheezing.   Abdominal:      General: Bowel sounds are normal. There is no distension.      Palpations: Abdomen is soft.      Tenderness: There is abdominal tenderness.   Musculoskeletal:      Cervical back: Neck supple.      Right lower leg: No edema.      Left lower leg: No edema.   Skin:     General: Skin is warm and dry.      Capillary Refill: Capillary refill takes less than 2 seconds.   Neurological:      General: No focal deficit present.      Mental Status: He is alert. Mental status is at baseline.   Psychiatric:          Mood and Affect: Affect is tearful.         Speech: Speech is tangential.         Behavior: Behavior is cooperative.       Vents:     Lines/Drains/Airways       Drain  Duration                  Hemodialysis AV Fistula Left upper arm -- days              Peripheral Intravenous Line  Duration                  Peripheral IV - Single Lumen 03/22/23 1032 22 G Posterior;Right Hand <1 day         Peripheral IV - Single Lumen 03/22/23 1033 22 G Distal;Left;Posterior Forearm <1 day                  Significant Labs:    CBC/Anemia Profile:  Recent Labs   Lab 03/21/23 2038 03/22/23  1018   WBC 4.72 5.02   HGB 7.7* 7.5*   HCT 24.4* 23.9*    197   MCV 93 94   RDW 14.9* 15.2*        Chemistries:  Recent Labs   Lab 03/21/23 2038 03/22/23  1018 03/22/23  1341   * 131* 131*   K 4.5 4.8 5.1    99 100   CO2 23 21* 19*   BUN 45* 53* 58*   CREATININE 4.0* 4.6* 4.6*   CALCIUM 8.4* 8.3* 8.4*   ALBUMIN 2.8* 2.9*  --    PROT 6.2 6.3  --    BILITOT 0.7 0.8  --    ALKPHOS 205* 202*  --    ALT 25 23  --    AST 29 24  --    MG  --   --  2.1   PHOS  --   --  4.6*  4.6*       All pertinent labs within the past 24 hours have been reviewed.    Significant Imaging: I have reviewed all pertinent imaging results/findings within the past 24 hours.    Assessment/Plan:     ENT  Epistaxis  Reported recurrent epistaxis which pt was seen for in ED on 3/21 and 3/22.  Patient received afrin and bleeding resolved.  Suspect secondary to hypertensive emergency and use of blood thinners.    -- Trend CBC  -- On cardene gtt for hypertension  -- Hold anticoagulation at this time, resume when appropriate    Pulmonary  Chronic hypoxemic respiratory failure  Patient with Hypoxic Respiratory failure which is Acute on chronic.  he is on home oxygen at 3 LPM. Supplemental oxygen was provided and noted-  .   Signs/symptoms of respiratory failure include- tachypnea and increased work of breathing. Contributing diagnoses includes - COPD Labs and images  were reviewed. Patient Has recent ABG, which has been reviewed. Will treat underlying causes and adjust management of respiratory failure as follows:  Chest xray with appearance of volume overload; rales on exam. Stable on 4L nasal cannula in the ED.    --Nephrology consulted for HD; volume removal  --BP control; Cardene drip  --Wean FiO2 for SpO2>88%  --Home inhalers; PRN nebs    Cardiac/Vascular  * Hypertensive emergency  Hypertensive in ED with SBP up to 240's.  On nifedipine, coreg, hydralazine, clonidine patch, imdur at home per chart review.  Started on cardene gtt in ED.      -- Titrate cardene for goal  for reduction of 25% SBP  -- Will resume home meds  -- Nephrology consulted for HD reports he does MWF has not had HD since Monday     HLD (hyperlipidemia)  Continue home statin.      Chronic diastolic heart failure  Echo 3/19 with EF 55% with grade 2 LV diastolic dysfunction. Pulmonary hypertension noted.      Renal/  ESRD (end stage renal disease)  ESRD on HD MWF.  Reports last HD on Monday he missed today given his admission.    -- Nephrology consulted for HD  -- Renally dose meds  -- Avoid nephrotoxins  -- Serial BMPs    Hematology  Multiple subsegmental pulmonary emboli without acute cor pulmonale  Hold home apixaban in the setting of epistaxis    Oncology  Anemia in ESRD (end-stage renal disease)  Epistaxis on admission, Hgb stable and bleeding now controlled.      -- CBC daily  -- Type and screen  -- Transfuse for Hgb <7  -- Epoetin T, TH, Sat    Endocrine  Type 2 diabetes mellitus with hyperglycemia, with long-term current use of insulin  Hyperglycemic on admission without elevated AG, BHB neg.  Not in DKA.      -- HbA1c on 3/6 9.5  -- Give long acting insulin now  -- Start insulin gtt per protocol with q1h accuchecks while on the drip.    -- Monitor electrolytes with BMP q4h while on insulin gtt and place on tele  -- Hold insulin gtt for K < 3.3           Critical Care Daily Checklist:    A:  Awake: RASS Goal/Actual Goal:    Actual:     B: Spontaneous Breathing Trial Performed?     C: SAT & SBT Coordinated?  n/a                      D: Delirium: CAM-ICU     E: Early Mobility Performed? No   F: Feeding Goal:    Status:     Current Diet Order   Procedures    Diet clear liquid     Diabetic; no sugar      AS: Analgesia/Sedation PRN   T: Thromboembolic Prophylaxis Holding, bleeding   H: HOB > 300 Yes   U: Stress Ulcer Prophylaxis (if needed)    G: Glucose Control Insulin gtt   B: Bowel Function     I: Indwelling Catheter (Lines & Dutta) Necessity PIV   D: De-escalation of Antimicrobials/Pharmacotherapies na    Plan for the day/ETD Admit to MICU    Code Status:  Family/Goals of Care: Full Code       Critical Care Time: 40 minutes  Critical secondary to Patient has a condition that poses threat to life and bodily function: Hypertensive Emergency     Plan discussed with Dr. Fernández.       Critical care was time spent personally by me on the following activities: development of treatment plan with patient or surrogate and bedside caregivers, discussions with consultants, evaluation of patient's response to treatment, examination of patient, ordering and performing treatments and interventions, ordering and review of laboratory studies, ordering and review of radiographic studies, pulse oximetry, re-evaluation of patient's condition. This critical care time did not overlap with that of any other provider or involve time for any procedures.     Alissa Denny NP  Critical Care Medicine  [facility] - Emergency Dept

## 2023-03-22 NOTE — ED NOTES
C/C: Epistaxis  APPEARANCE: awake and alert in NAD. Pt in no acute distress currently.  SKIN: warm, dry and intact. No breakdown or bruising.  MUSCULOSKELETAL: Patient moving all extremities spontaneously, no obvious swelling or deformities noted.  RESPIRATORY: Reports shortness of breath; on 5L NC. Respirations unlabored. Normal rate and effort.  CARDIAC: Denies CP, 2+ distal pulses; no peripheral edema.  ABDOMEN: S/ND/NT, Denies nausea/vomiting.  : voids spontaneously, denies difficulty. Patient reports that he has been having uncontrolled bowel movements.  Neurologic: AAO x 4; follows commands equal strength in all extremities; denies numbness/tingling. Denies dizziness.  HEENT: Epistaxis; bleeding controlled, left eye bloodshot.   Renal: dialysis patient; fistula noted to BUE, per EMS fistula bleeding, now controlled.

## 2023-03-22 NOTE — SUBJECTIVE & OBJECTIVE
Past Medical History:   Diagnosis Date    Chronic kidney disease-mineral and bone disorder 10/12/2022    COPD (chronic obstructive pulmonary disease)     Diabetes mellitus type 1     ESRD on dialysis     Hypertension     Hypervolemia 2/22/2023    Hyponatremia 3/4/2023    SOB (shortness of breath) 10/12/2022    Patient with history COPD treated with Ellipta the albuterol, fluticasone-salmeterol tachypneic in the ED saturating 94% and 6 L on nasal cannula, patient does not know how many  he uses it is in nursing home.    -duo nebs Q 4  Given that patient is a poor historian, and in the setting of left lower extremity edema and history of coagulopathy PE cannot be ruled out.  Will workup for possible infec       Past Surgical History:   Procedure Laterality Date    AV FISTULA PLACEMENT         Review of patient's allergies indicates:  No Known Allergies  Current Facility-Administered Medications   Medication Frequency    0.9%  NaCl infusion PRN    albuterol-ipratropium 2.5 mg-0.5 mg/3 mL nebulizer solution 3 mL Q4H PRN    aspirin EC tablet 81 mg Daily    atorvastatin tablet 40 mg Daily    carvediloL tablet 6.25 mg BID    dextrose 10% bolus 125 mL 125 mL PRN    dextrose 10% bolus 250 mL 250 mL PRN    [START ON 3/23/2023] epoetin xavier injection 7,000 Units Every Tues, Thurs, Sat    FLUoxetine capsule 40 mg Daily    hydrALAZINE tablet 100 mg Q8H    insulin detemir U-100 (Levemir) pen 7 Units BID    insulin regular in 0.9 % NaCl 100 unit/100 mL (1 unit/mL) infusion Continuous    melatonin tablet 6 mg Nightly PRN    niCARdipine 40 mg/200 mL (0.2 mg/mL) infusion Continuous    sodium chloride 0.9% flush 10 mL PRN    tiotropium bromide 2.5 mcg/actuation inhaler 2 puff Daily     Current Outpatient Medications   Medication    acetaminophen (TYLENOL) 650 MG TbSR    albuterol (PROVENTIL/VENTOLIN HFA) 90 mcg/actuation inhaler    albuterol-ipratropium (DUO-NEB) 2.5 mg-0.5 mg/3 mL nebulizer solution    apixaban (ELIQUIS) 5 mg Tab     artificial tears (ISOPTO TEARS) 0.5 % ophthalmic solution    aspirin (ECOTRIN) 81 MG EC tablet    atorvastatin (LIPITOR) 40 MG tablet    carvediloL (COREG) 3.125 MG tablet    cetirizine (ZYRTEC) 10 MG tablet    cloNIDine 0.3 mg/24 hr td ptwk (CATAPRES) 0.3 mg/24 hr    epoetin xavier (PROCRIT) 10,000 unit/mL injection    erythromycin (ROMYCIN) ophthalmic ointment    FLUoxetine 40 MG capsule    fluticasone-salmeterol diskus inhaler 250-50 mcg    GLUCAGON EMERGENCY KIT, HUMAN, 1 mg injection    hydrALAZINE (APRESOLINE) 100 MG tablet    HYDROPHILIC CREAM TOP    insulin detemir U-100 (LEVEMIR FLEXTOUCH) 100 unit/mL (3 mL) SubQ InPn pen    insulin lispro (HUMALOG U-100 INSULIN) 100 unit/mL injection    isosorbide mononitrate (IMDUR) 30 MG 24 hr tablet    melatonin 3 mg TbDL    NIFEdipine (PROCARDIA-XL) 90 MG (OSM) 24 hr tablet    nut.tx.imp.renal fxn,lac-reduc (NEPRO CARB STEADY ORAL)    sodium chloride (OCEAN) 0.65 % nasal spray    sodium chloride (SALINE NASAL) 0.65 % nasal spray    tiotropium bromide (SPIRIVA RESPIMAT) 2.5 mcg/actuation inhaler    triamcinolone acetonide 0.025 % Lotn    vitamin renal formula, B-complex-vitamin c-folic acid, (NEPHROCAP) 1 mg Cap    vits A and D-white pet-lanolin ointment    white petrolatum (VASELINE) ointment     Family History       Problem Relation (Age of Onset)    Diabetes Mother          Tobacco Use    Smoking status: Never    Smokeless tobacco: Never   Substance and Sexual Activity    Alcohol use: Not Currently    Drug use: Not on file    Sexual activity: Not Currently     Review of Systems   Constitutional: Negative.    HENT: Negative.     Eyes: Negative.    Respiratory:  Positive for cough and shortness of breath.    Cardiovascular: Negative.    Gastrointestinal: Negative.    Genitourinary:  Positive for decreased urine volume.   Skin: Negative.    Neurological:  Positive for weakness.   Hematological: Negative.    Psychiatric/Behavioral: Negative.     Objective:     Vital Signs  (Most Recent):  Temp: 97.7 °F (36.5 °C) (03/22/23 0943)  Pulse: 78 (03/22/23 1532)  Resp: 15 (03/22/23 1532)  BP: (!) 173/76 (03/22/23 1532)  SpO2: (!) 93 % (03/22/23 1532)   Vital Signs (24h Range):  Temp:  [97.7 °F (36.5 °C)-98.2 °F (36.8 °C)] 97.7 °F (36.5 °C)  Pulse:  [60-94] 78  Resp:  [14-19] 15  SpO2:  [91 %-98 %] 93 %  BP: (139-240)/() 173/76     Weight: 70.3 kg (155 lb) (03/22/23 0913)  Body mass index is 22.89 kg/m².  Body surface area is 1.85 meters squared.    No intake/output data recorded.    Physical Exam  Vitals and nursing note reviewed.   Constitutional:       General: He is not in acute distress.     Appearance: He is ill-appearing.      Comments: Chronically ill appearing   HENT:      Mouth/Throat:      Mouth: Mucous membranes are dry.   Eyes:      Pupils: Pupils are equal, round, and reactive to light.   Cardiovascular:      Rate and Rhythm: Normal rate and regular rhythm.      Pulses: Normal pulses.   Pulmonary:      Effort: Pulmonary effort is normal. No respiratory distress.      Breath sounds: Rales present. No wheezing.   Abdominal:      General: Bowel sounds are normal. There is no distension.      Palpations: Abdomen is soft.      Tenderness: There is abdominal tenderness.   Musculoskeletal:      Cervical back: Neck supple.      Right lower leg: No edema.      Left lower leg: No edema.   Skin:     General: Skin is warm and dry.   Neurological:      General: No focal deficit present.      Mental Status: He is alert. Mental status is at baseline.   Psychiatric:         Behavior: Behavior is cooperative.       Significant Labs:  CBC:   Recent Labs   Lab 03/22/23  1018   WBC 5.02   RBC 2.54*   HGB 7.5*   HCT 23.9*      MCV 94   MCH 29.5   MCHC 31.4*     CMP:   Recent Labs   Lab 03/22/23  1018 03/22/23  1341   * 592*   CALCIUM 8.3* 8.4*   ALBUMIN 2.9*  --    PROT 6.3  --    * 131*   K 4.8 5.1   CO2 21* 19*   CL 99 100   BUN 53* 58*   CREATININE 4.6* 4.6*   ALKPHOS  202*  --    ALT 23  --    AST 24  --    BILITOT 0.8  --      All labs within the past 24 hours have been reviewed.

## 2023-03-22 NOTE — CONSULTS
Consult received. Patient to be admitted to the MICU. Full H&P to follow.    Alissa Denny NP  Critical Care Medicine  3/22/2023   2:48 PM

## 2023-03-22 NOTE — ED TRIAGE NOTES
Patient comes into the emergency department by EMS with complaints of epistaxis x 4 days. Patient states that he's been to the ED 4 times for the same issue. Patient reports that nose bleeds are not common for him to experience, but he does report SOB. Patient denies n/v, fever, HA.

## 2023-03-22 NOTE — CONSULTS
[facility] - Emergency Dept  Nephrology  Progress Note    Patient Name: Cosme Lewis  MRN: [id number]  Admission Date: 3/22/2023  Hospital Length of Stay: 0 days  Attending Provider: Marquis Fernández MD   Primary Care Physician: Primary Doctor No  Principal Problem:Hypertensive emergency    Subjective:     HPI: Cosme Lewis is a 57 year old male presents with recurrent epistaxis and SOB x 2 days. PMHx of hypertension, type 1 diabetes, COPD, HFpEF on 3 L home O2 and ESRD on hemodialysis (M/W/F) who presented to the ED for complaints of epistaxis x 1 day.On presentation to the ED: CXR with bilateral edema. Last HD on 3/20/23. K. 5.1. SBP 240s and patient started on cardene gtt. Nephrology consulted for ESRD on HD      Past Medical History:   Diagnosis Date    Chronic kidney disease-mineral and bone disorder 10/12/2022    COPD (chronic obstructive pulmonary disease)     Diabetes mellitus type 1     ESRD on dialysis     Hypertension     Hypervolemia 2/22/2023    Hyponatremia 3/4/2023    SOB (shortness of breath) 10/12/2022    Patient with history COPD treated with Ellipta the albuterol, fluticasone-salmeterol tachypneic in the ED saturating 94% and 6 L on nasal cannula, patient does not know how many  he uses it is in nursing home.    -duo nebs Q 4  Given that patient is a poor historian, and in the setting of left lower extremity edema and history of coagulopathy PE cannot be ruled out.  Will workup for possible infec       Past Surgical History:   Procedure Laterality Date    AV FISTULA PLACEMENT         Review of patient's allergies indicates:  No Known Allergies  Current Facility-Administered Medications   Medication Frequency    0.9%  NaCl infusion PRN    albuterol-ipratropium 2.5 mg-0.5 mg/3 mL nebulizer solution 3 mL Q4H PRN    aspirin EC tablet 81 mg Daily    atorvastatin tablet 40 mg Daily    carvediloL tablet 6.25 mg BID    dextrose 10% bolus 125 mL 125 mL PRN    dextrose 10% bolus 250 mL 250 mL PRN     [START ON 3/23/2023] epoetin xavier injection 7,000 Units Every Tues, Thurs, Sat    FLUoxetine capsule 40 mg Daily    hydrALAZINE tablet 100 mg Q8H    insulin detemir U-100 (Levemir) pen 7 Units BID    insulin regular in 0.9 % NaCl 100 unit/100 mL (1 unit/mL) infusion Continuous    melatonin tablet 6 mg Nightly PRN    niCARdipine 40 mg/200 mL (0.2 mg/mL) infusion Continuous    sodium chloride 0.9% flush 10 mL PRN    tiotropium bromide 2.5 mcg/actuation inhaler 2 puff Daily     Current Outpatient Medications   Medication    acetaminophen (TYLENOL) 650 MG TbSR    albuterol (PROVENTIL/VENTOLIN HFA) 90 mcg/actuation inhaler    albuterol-ipratropium (DUO-NEB) 2.5 mg-0.5 mg/3 mL nebulizer solution    apixaban (ELIQUIS) 5 mg Tab    artificial tears (ISOPTO TEARS) 0.5 % ophthalmic solution    aspirin (ECOTRIN) 81 MG EC tablet    atorvastatin (LIPITOR) 40 MG tablet    carvediloL (COREG) 3.125 MG tablet    cetirizine (ZYRTEC) 10 MG tablet    cloNIDine 0.3 mg/24 hr td ptwk (CATAPRES) 0.3 mg/24 hr    epoetin xavier (PROCRIT) 10,000 unit/mL injection    erythromycin (ROMYCIN) ophthalmic ointment    FLUoxetine 40 MG capsule    fluticasone-salmeterol diskus inhaler 250-50 mcg    GLUCAGON EMERGENCY KIT, HUMAN, 1 mg injection    hydrALAZINE (APRESOLINE) 100 MG tablet    HYDROPHILIC CREAM TOP    insulin detemir U-100 (LEVEMIR FLEXTOUCH) 100 unit/mL (3 mL) SubQ InPn pen    insulin lispro (HUMALOG U-100 INSULIN) 100 unit/mL injection    isosorbide mononitrate (IMDUR) 30 MG 24 hr tablet    melatonin 3 mg TbDL    NIFEdipine (PROCARDIA-XL) 90 MG (OSM) 24 hr tablet    nut.tx.imp.renal fxn,lac-reduc (NEPRO CARB STEADY ORAL)    sodium chloride (OCEAN) 0.65 % nasal spray    sodium chloride (SALINE NASAL) 0.65 % nasal spray    tiotropium bromide (SPIRIVA RESPIMAT) 2.5 mcg/actuation inhaler    triamcinolone acetonide 0.025 % Lotn    vitamin renal formula, B-complex-vitamin c-folic acid, (NEPHROCAP) 1 mg Cap     vits A and D-white pet-lanolin ointment    white petrolatum (VASELINE) ointment     Family History       Problem Relation (Age of Onset)    Diabetes Mother          Tobacco Use    Smoking status: Never    Smokeless tobacco: Never   Substance and Sexual Activity    Alcohol use: Not Currently    Drug use: Not on file    Sexual activity: Not Currently     Review of Systems   Constitutional: Negative.    HENT: Negative.     Eyes: Negative.    Respiratory:  Positive for cough and shortness of breath.    Cardiovascular: Negative.    Gastrointestinal: Negative.    Genitourinary:  Positive for decreased urine volume.   Skin: Negative.    Neurological:  Positive for weakness.   Hematological: Negative.    Psychiatric/Behavioral: Negative.     Objective:     Vital Signs (Most Recent):  Temp: 97.7 °F (36.5 °C) (03/22/23 0943)  Pulse: 78 (03/22/23 1532)  Resp: 15 (03/22/23 1532)  BP: (!) 173/76 (03/22/23 1532)  SpO2: (!) 93 % (03/22/23 1532)   Vital Signs (24h Range):  Temp:  [97.7 °F (36.5 °C)-98.2 °F (36.8 °C)] 97.7 °F (36.5 °C)  Pulse:  [60-94] 78  Resp:  [14-19] 15  SpO2:  [91 %-98 %] 93 %  BP: (139-240)/() 173/76     Weight: 70.3 kg (155 lb) (03/22/23 0913)  Body mass index is 22.89 kg/m².  Body surface area is 1.85 meters squared.    No intake/output data recorded.    Physical Exam  Vitals and nursing note reviewed.   Constitutional:       General: He is not in acute distress.     Appearance: He is ill-appearing.      Comments: Chronically ill appearing   HENT:      Mouth/Throat:      Mouth: Mucous membranes are dry.   Eyes:      Pupils: Pupils are equal, round, and reactive to light.   Cardiovascular:      Rate and Rhythm: Normal rate and regular rhythm.      Pulses: Normal pulses.   Pulmonary:      Effort: Pulmonary effort is normal. No respiratory distress.      Breath sounds: Rales present. No wheezing.   Abdominal:      General: Bowel sounds are normal. There is no distension.      Palpations: Abdomen is  soft.      Tenderness: There is abdominal tenderness.   Musculoskeletal:      Cervical back: Neck supple.      Right lower leg: No edema.      Left lower leg: No edema.   Skin:     General: Skin is warm and dry.   Neurological:      General: No focal deficit present.      Mental Status: He is alert. Mental status is at baseline.   Psychiatric:         Behavior: Behavior is cooperative.       Significant Labs:  CBC:   Recent Labs   Lab 03/22/23  1018   WBC 5.02   RBC 2.54*   HGB 7.5*   HCT 23.9*      MCV 94   MCH 29.5   MCHC 31.4*     CMP:   Recent Labs   Lab 03/22/23  1018 03/22/23  1341   * 592*   CALCIUM 8.3* 8.4*   ALBUMIN 2.9*  --    PROT 6.3  --    * 131*   K 4.8 5.1   CO2 21* 19*   CL 99 100   BUN 53* 58*   CREATININE 4.6* 4.6*   ALKPHOS 202*  --    ALT 23  --    AST 24  --    BILITOT 0.8  --      All labs within the past 24 hours have been reviewed.        Assessment/Plan:     Cardiac/Vascular  * Hypertensive emergency  -management per primary     Renal/  Chronic kidney disease-mineral and bone disorder  -Low phos diet   -No indication for phos binders at this time   -Trend phos levels with daily labs     ESRD (end stage renal disease)  Admitted for Hypertensive urgency and hyperglycemia. Chest x ray with bilateral edema. Echo on 3/20 EF 55%, CVP 15    Outpatient HD Information:     -Outpatient HD unit: [facility]   -HD tx days: MWF   -HD tx time: 4hrs   -Last HD tx prior to presentation: 3/20/23  -HD access: LUE AVF   -HD modality: iHD   -Residual urine: Anuric         Plan/Recommendations:     -HD today for metabolic clearance and volume management. UF goal 4L.   -Renal diet, if not NPO   -Strict I/O's and daily weights  -Daily renal function panels   -Renally dose meds    Oncology  Anemia in ESRD (end-stage renal disease)  - Hgb goal 10-11  - defer epo at this time in setting of hypertensive urgency         Thank you for your consult. I will follow-up with patient. Please contact us if you  have any additional questions.    Citlalli Simpson, KUMAR  Nephrology  [facility] - Emergency Dept

## 2023-03-22 NOTE — ED PROVIDER NOTES
Chief Complaint   Epistaxis (EMS reports patient has nosebleed/ was seen overnight- nose still bleeding)      History Of Present Illness   Cosme Lewis is a 57 y.o. male presenting with recurrent epistaxis and shortness of breath.  The patient also states I am pooping on myself.   He was seen in the ER few hours ago for epistaxis that had resolved.  He is a patient at [facility] nursing [facility].  He was sent back to the ED when the bleeding recurred.  The shortness of breath started after his last ED visit.      History obtained from:  Patient    Review of patient's allergies indicates:  No Known Allergies    No current facility-administered medications on file prior to encounter.     Current Outpatient Medications on File Prior to Encounter   Medication Sig Dispense Refill    albuterol (PROVENTIL/VENTOLIN HFA) 90 mcg/actuation inhaler Inhale 2 puffs into the lungs 4 (four) times daily as needed (breathing).      apixaban (ELIQUIS) 5 mg Tab Take 5 mg by mouth 2 (two) times daily.      atorvastatin (LIPITOR) 40 MG tablet Take 1 tablet (40 mg total) by mouth once daily. (Patient taking differently: Take 40 mg by mouth every evening.) 90 tablet 3    carvediloL (COREG) 3.125 MG tablet Take 2 tablets (6.25 mg total) by mouth 2 (two) times daily. 120 tablet 11    cloNIDine 0.3 mg/24 hr td ptwk (CATAPRES) 0.3 mg/24 hr Place 1 patch onto the skin every 7 days.      FLUoxetine 40 MG capsule Take 40 mg by mouth once daily.      fluticasone-salmeterol diskus inhaler 250-50 mcg Inhale 1 puff into the lungs 2 (two) times daily.      hydrALAZINE (APRESOLINE) 100 MG tablet Take 1 tablet (100 mg total) by mouth every 8 (eight) hours.      insulin detemir U-100 (LEVEMIR FLEXTOUCH) 100 unit/mL (3 mL) SubQ InPn pen Inject 7 Units into the skin 2 (two) times daily. 4.2 mL 11    insulin lispro (HUMALOG U-100 INSULIN) 100 unit/mL injection Inject 5 Units into the skin 3 (three) times daily before meals. 4.5 mL 11    isosorbide  mononitrate (IMDUR) 30 MG 24 hr tablet Take 30 mg by mouth 2 (two) times daily.      NIFEdipine (PROCARDIA-XL) 90 MG (OSM) 24 hr tablet Take 1 tablet (90 mg total) by mouth once daily. 30 tablet 11    sevelamer carbonate (RENVELA) 800 mg Tab Take 800 mg by mouth 3 (three) times daily.      tiotropium bromide (SPIRIVA RESPIMAT) 2.5 mcg/actuation inhaler Inhale 2 puffs into the lungs Daily.      acetaminophen (TYLENOL) 650 MG TbSR Take 650 mg by mouth every 8 (eight) hours as needed (pain or fever over 100.4).      albuterol-ipratropium (DUO-NEB) 2.5 mg-0.5 mg/3 mL nebulizer solution Take 3 mLs by nebulization every 4 (four) hours while awake. Rescue 6480 mL 0    artificial tears (ISOPTO TEARS) 0.5 % ophthalmic solution Place 1 drop into both eyes 6 (six) times daily. 15 mL 0    aspirin (ECOTRIN) 81 MG EC tablet Take 81 mg by mouth once daily.      cetirizine (ZYRTEC) 10 MG tablet Take 10 mg by mouth daily as needed (itching).      epoetin xavier (PROCRIT) 10,000 unit/mL injection Inject 0.7 mLs (7,000 Units total) into the skin every Tues, Thurs, Sat.      erythromycin (ROMYCIN) ophthalmic ointment Place a 1/2 inch ribbon of ointment into the lower eyelid three times a day. 3.5 g 0    GLUCAGON EMERGENCY KIT, HUMAN, 1 mg injection 1 mg into the muscle as needed if CBG < 70      HYDROPHILIC CREAM TOP Apply liberal amount to affected area twice daily for dry skin      melatonin 3 mg TbDL Take 9 mg by mouth nightly as needed (sleep).      nut.tx.imp.renal fxn,lac-reduc (NEPRO CARB STEADY ORAL) Give 1 carton by mouth with each meal.      ondansetron (ZOFRAN) 8 MG tablet Take 1 tablet by mouth 3 (three) times daily as needed.      sodium chloride (SALINE NASAL) 0.65 % nasal spray 1 spray by Nasal route as needed (dry nostril). 30 mL 12    triamcinolone acetonide 0.025% (KENALOG) 0.025 % cream Apply topically.      vitamin renal formula, B-complex-vitamin c-folic acid, (NEPHROCAP) 1 mg Cap Take 1 capsule by mouth once daily.       vits A and D-white pet-lanolin ointment Apply topically 2 (two) times daily. Apply twice daily to penis 15 g 0    white petrolatum (VASELINE) ointment Apply topically once daily. Apply small amount to both nostrils daily 5 g 0    [DISCONTINUED] sodium chloride (OCEAN) 0.65 % nasal spray 2 sprays by Nasal route every 4 (four) hours as needed for Congestion.      [DISCONTINUED] triamcinolone acetonide 0.025 % Lotn Apply to the affected area twice daily         Past History   As per HPI and below:  Past Medical History:   Diagnosis Date    Chronic kidney disease-mineral and bone disorder 10/12/2022    COPD (chronic obstructive pulmonary disease)     Diabetes mellitus type 1     ESRD on dialysis     Hypertension     Hypervolemia 2/22/2023    Hyponatremia 3/4/2023    SOB (shortness of breath) 10/12/2022    Patient with history COPD treated with Ellipta the albuterol, fluticasone-salmeterol tachypneic in the ED saturating 94% and 6 L on nasal cannula, patient does not know how many  he uses it is in nursing home.    -duo nebs Q 4  Given that patient is a poor historian, and in the setting of left lower extremity edema and history of coagulopathy PE cannot be ruled out.  Will workup for possible infec     Past Surgical History:   Procedure Laterality Date    AV FISTULA PLACEMENT         Social History     Socioeconomic History    Marital status:    Tobacco Use    Smoking status: Never    Smokeless tobacco: Never   Substance and Sexual Activity    Alcohol use: Not Currently    Sexual activity: Not Currently       Family History   Problem Relation Age of Onset    Diabetes Mother        Physical Exam     Vitals:    03/23/23 0600 03/23/23 0700 03/23/23 0715 03/23/23 0730   BP: (!) 161/65 (!) 149/70 (!) 145/68 (!) 154/72   BP Location: Right arm      Patient Position: Lying      Pulse: 63 62 62 62   Resp: (!) 43 (!) 21 (!) 54 (!) 46   Temp:    (P) 97.7 °F (36.5 °C)   TempSrc:    (P) Oral   SpO2: 99% 100% 100% 100%    Weight:       Height:         Appearance:  Can speak 2 or 3 words between breaths.  Skin: No rashes seen.  Good turgor.  No abrasions.  No ecchymoses.  Eyes: No conjunctival injection.  ENT: Blood at both nares, no active bleeding.    Chest: Clear to auscultation bilaterally.  Good air movement.  No wheezes.  No rhonchi.  Cardiovascular: Regular rate and rhythm.  No murmurs. No gallops. No rubs.  Abdomen: Soft.  Not distended.  Nontender.  No guarding.  No rebound.  Musculoskeletal: Good range of motion all joints.  No deformities.  Neck supple.  No meningismus.  Neurologic: Motor intact.  Sensation intact.  Cerebellar intact.  Cranial nerves intact.  Mental Status:  Alert and oriented x 3.  Appropriate, conversant.      Initial MDM   Epistaxis, recurrent, not heavy at this time.  Will give afrin and reassess.  SOB in ESRD patient, today is dialysis day, patient may be volume overloaded.  Will check CXR, labs.  Patient also has COPD and is wheezing.  Will give a breathing treatment and prednisone.  Will follow response to treatment, may need hospitalization for volume overload.      Medications Given     Medications   0.9%  NaCl infusion (has no administration in time range)   dextrose 10% bolus 125 mL 125 mL (0 mLs Intravenous Stopped 3/23/23 0428)   dextrose 10% bolus 250 mL 250 mL (250 mLs Intravenous New Bag 3/23/23 0857)   sodium chloride 0.9% flush 10 mL (has no administration in time range)   carvediloL tablet 6.25 mg (6.25 mg Oral Given 3/23/23 0901)   FLUoxetine capsule 40 mg (40 mg Oral Given 3/23/23 0901)   aspirin EC tablet 81 mg (81 mg Oral Given 3/23/23 0901)   atorvastatin tablet 40 mg (40 mg Oral Given 3/23/23 0901)   albuterol-ipratropium 2.5 mg-0.5 mg/3 mL nebulizer solution 3 mL (has no administration in time range)   melatonin tablet 6 mg (has no administration in time range)   tiotropium bromide 2.5 mcg/actuation inhaler 2 puff (has no administration in time range)   fluticasone  furoate-vilanteroL 100-25 mcg/dose diskus inhaler 1 puff (has no administration in time range)   insulin aspart U-100 pen 0-5 Units (has no administration in time range)   glucagon (human recombinant) injection 1 mg (has no administration in time range)   mupirocin 2 % ointment ( Nasal Given 3/23/23 0901)   insulin detemir U-100 (Levemir) pen 4 Units (0 Units Subcutaneous Hold 3/23/23 0900)   NIFEdipine 24 hr tablet 90 mg (90 mg Oral Given 3/23/23 0901)   lisinopriL tablet 20 mg (has no administration in time range)   oxymetazoline 0.05 % nasal spray 1 spray (1 spray Each Nostril Given 3/22/23 0948)   albuterol-ipratropium 2.5 mg-0.5 mg/3 mL nebulizer solution 3 mL (3 mLs Nebulization Given 3/22/23 0935)   predniSONE tablet 60 mg (60 mg Oral Given 3/22/23 0948)   insulin regular injection 4 Units 0.04 mL (4 Units Intravenous Given 3/22/23 1147)   enalaprilat injection 0.625 mg (0.625 mg Intravenous Given 3/22/23 1239)   enalaprilat injection 0.625 mg (0.625 mg Intravenous Given 3/22/23 1344)   magnesium sulfate 2g in water 50mL IVPB (premix) (0 g Intravenous Stopped 3/23/23 0843)   potassium bicarbonate disintegrating tablet 50 mEq (50 mEq Oral Given 3/23/23 0638)       Results and Course     Labs Reviewed   CBC W/ AUTO DIFFERENTIAL - Abnormal; Notable for the following components:       Result Value    RBC 2.54 (*)     Hemoglobin 7.5 (*)     Hematocrit 23.9 (*)     MCHC 31.4 (*)     RDW 15.2 (*)     Lymph # 0.5 (*)     Gran % 80.6 (*)     Lymph % 10.0 (*)     All other components within normal limits   COMPREHENSIVE METABOLIC PANEL - Abnormal; Notable for the following components:    Sodium 131 (*)     CO2 21 (*)     Glucose 474 (*)     BUN 53 (*)     Creatinine 4.6 (*)     Calcium 8.3 (*)     Albumin 2.9 (*)     Alkaline Phosphatase 202 (*)     eGFR 13.9 (*)     All other components within normal limits   PHOSPHORUS - Abnormal; Notable for the following components:    Phosphorus 4.6 (*)     All other components  within normal limits    Narrative:     With next lab draw   BASIC METABOLIC PANEL - Abnormal; Notable for the following components:    Sodium 131 (*)     CO2 19 (*)     Glucose 592 (*)     BUN 58 (*)     Creatinine 4.6 (*)     Calcium 8.4 (*)     eGFR 13.9 (*)     All other components within normal limits   PHOSPHORUS - Abnormal; Notable for the following components:    Phosphorus 4.6 (*)     All other components within normal limits   BETA - HYDROXYBUTYRATE, SERUM - Abnormal; Notable for the following components:    Beta-Hydroxybutyrate 1.8 (*)     All other components within normal limits   POCT GLUCOSE - Abnormal; Notable for the following components:    POCT Glucose >500 (*)     All other components within normal limits   POCT GLUCOSE - Abnormal; Notable for the following components:    POCT Glucose >500 (*)     All other components within normal limits   POCT GLUCOSE - Abnormal; Notable for the following components:    POCT Glucose >500 (*)     All other components within normal limits   POCT GLUCOSE - Abnormal; Notable for the following components:    POCT Glucose >500 (*)     All other components within normal limits   POCT GLUCOSE - Abnormal; Notable for the following components:    POCT Glucose >500 (*)     All other components within normal limits   POCT GLUCOSE - Abnormal; Notable for the following components:    POCT Glucose >500 (*)     All other components within normal limits   POCT GLUCOSE - Abnormal; Notable for the following components:    POCT Glucose >500 (*)     All other components within normal limits   POCT GLUCOSE - Abnormal; Notable for the following components:    POCT Glucose 440 (*)     All other components within normal limits   MAGNESIUM    Narrative:     With next lab draw       Imaging Results              X-Ray Chest 1 View (Final result)  Result time 03/22/23 10:48:08      Final result by Wade Heck MD (03/22/23 10:48:08)                   Impression:      Stable examination  findings of fluid overload state secondary to CHF.      Electronically signed by: Wade Heck MD  Date:    03/22/2023  Time:    10:48               Narrative:    EXAMINATION:  XR CHEST 1 VIEW    CLINICAL HISTORY:  Shortness of breath    TECHNIQUE:  Single frontal view of the chest was performed.    COMPARISON:  03/21/2023, 03/16/2023, and 03/04/2022.    FINDINGS:  Monitoring EKG leads are present.  The trachea is unremarkable.  The cardiomediastinal silhouette is enlarged.  There is no evidence of free air beneath the hemidiaphragms.  There are unchanged bilateral pleural effusions.  There is no evidence of a pneumothorax.  There is no evidence of pneumomediastinum.  There is unchanged appearance of diffuse bilateral ground-glass airspace opacities.  The osseous structures are unremarkable.    There are postop changes in the left axillary region.                                      ED Course as of 03/23/23 0928   Wed Mar 22, 2023   1021 No visible epistaxis upon repeat evaluation. [DC]   1107 Hemoglobin(!): 7.5  Similar to previous [DC]   1107 Platelets: 197 [DC]   1107 WBC: 5.02 [DC]   1107 X-Ray Chest 1 View  Volume overload per my independent interpretation.     [DC]   1143 Glucose(!!): 474 [DC]   1143 Potassium: 4.8 [DC]   1157 POCT Glucose(!): >500 [DC]   1254 Discussed with nephrology [DC]   1409 Discussed with ICU   [DC]   1422 Glucose(!!): 592 [DC]      ED Course User Index  [DC] Hal Esteban MD     Critical Care   Date: 03/23/2023  Performed by: Hal Esteban MD   Authorized by: Hal Esteban MD    Total critical care time (exclusive of procedural time) : 80 minutes  Critical care was necessary to treat or prevent imminent or life-threatening deterioration of the following conditions:  hyperglycemia, hypertension both requiring infusions        MDM, Impression and Plan   57 y.o. male with epistaxis, elevated blood sugar and shortness of breath.  He epistaxis was treated with Afrin successfully,  but his blood pressure subsequently became elevated was refractory to push dose treatment; a drip was started.  Redness of breath improved after breathing treatment and prednisone, but patient's blood sugar became significantly elevated after prednisone, insulin drip started.  X-ray suggestive of volume overload, will need dialysis in the hospital.  Given the 2 infusions, discussed with ICU for admission.         Final diagnoses:  [R06.02] Shortness of breath (Primary)  [R04.0] Recurrent epistaxis  [R73.9] Hyperglycemia  [I10] Primary hypertension  [I16.1] Hypertensive emergency        ED Disposition Condition    Admit Stable                  Hal Esteban MD  03/23/23 7339

## 2023-03-22 NOTE — ASSESSMENT & PLAN NOTE
Admitted for Hypertensive urgency and hyperglycemia. Chest x ray with bilateral edema. Echo on 3/20 EF 55%, CVP 15    Outpatient HD Information:     -Outpatient HD unit: [facility]   -HD tx days: MWF   -HD tx time: 4hrs   -Last HD tx prior to presentation: 3/20/23  -HD access: LUE AVF   -HD modality: iHD   -Residual urine: Anuric         Plan/Recommendations:     -HD today for metabolic clearance and volume management. UF goal 4L.   -Renal diet, if not NPO   -Strict I/O's and daily weights  -Daily renal function panels   -Renally dose meds

## 2023-03-22 NOTE — ASSESSMENT & PLAN NOTE
Hypertensive in ED with SBP up to 240's.  On nifedipine, coreg, hydralazine, clonidine patch, imdur at home per chart review.  Started on cardene gtt in ED.      -- Titrate cardene for goal  for reduction of 25% SBP  -- Will resume home meds  -- Nephrology consulted for HD reports he does MWF has not had HD since Monday

## 2023-03-22 NOTE — ASSESSMENT & PLAN NOTE
Reported recurrent epistaxis which pt was seen for in ED on 3/21 and 3/22.  Patient received afrin and bleeding resolved.  Suspect secondary to hypertensive emergency and use of blood thinners.    -- Trend CBC  -- On cardene gtt for hypertension  -- Hold anticoagulation at this time, resume when appropriate

## 2023-03-22 NOTE — ASSESSMENT & PLAN NOTE
ESRD on HD MWF.  Reports last HD on Monday he missed today given his admission.    -- Nephrology consulted for HD  -- Renally dose meds  -- Avoid nephrotoxins  -- Serial BMPs

## 2023-03-23 PROBLEM — I10 PRIMARY HYPERTENSION: Status: ACTIVE | Noted: 2023-01-01

## 2023-03-23 PROBLEM — R06.02 SHORTNESS OF BREATH: Status: ACTIVE | Noted: 2023-01-01

## 2023-03-23 NOTE — ASSESSMENT & PLAN NOTE
Admitted for Hypertensive urgency and hyperglycemia. Chest x ray with bilateral edema. Echo on 3/20 EF 55%, CVP 15    Outpatient HD Information:     -Outpatient HD unit: [facility]   -HD tx days: MWF   -HD tx time: 4hrs   -Last HD tx prior to presentation: 3/20/23  -HD access: LUE AVF   -HD modality: iHD   -Residual urine: Anuric      3/22:  HD completed, net UF of 4L.     Plan/Recommendations:     -no further plans for RRT today, next HD tomorrow  -Renal diet, if not NPO   -Strict I/O's and daily weights  -Daily renal function panels   -Renally dose meds

## 2023-03-23 NOTE — PROGRESS NOTES
[facility]  Nephrology  Progress Note    Patient Name: Cosme Lewis  MRN: [id number]  Admission Date: 3/22/2023  Hospital Length of Stay: 1 days  Attending Provider: Marquis Fernández MD   Primary Care Physician: Primary Doctor No  Principal Problem:Hypertensive emergency    Subjective:     Interval History:   HD completed earlier this morning with net ultrafiltration of 4L.  Weaned off cardene gtt.  Electrolytes non-emergent, NAD on exam.    Review of patient's allergies indicates:  No Known Allergies  Current Facility-Administered Medications   Medication Frequency    0.9%  NaCl infusion PRN    albuterol-ipratropium 2.5 mg-0.5 mg/3 mL nebulizer solution 3 mL Q4H PRN    aspirin EC tablet 81 mg Daily    atorvastatin tablet 40 mg Daily    carvediloL tablet 6.25 mg BID    dextrose 10% bolus 125 mL 125 mL PRN    dextrose 10% bolus 250 mL 250 mL PRN    FLUoxetine capsule 40 mg Daily    fluticasone furoate-vilanteroL 100-25 mcg/dose diskus inhaler 1 puff Daily    glucagon (human recombinant) injection 1 mg PRN    hydrALAZINE tablet 100 mg Q8H    insulin aspart U-100 pen 0-5 Units Q4H PRN    insulin detemir U-100 (Levemir) pen 4 Units BID    melatonin tablet 6 mg Nightly PRN    mupirocin 2 % ointment BID    niCARdipine 40 mg/200 mL (0.2 mg/mL) infusion Continuous    NIFEdipine 24 hr tablet 90 mg Daily    sodium chloride 0.9% flush 10 mL PRN    tiotropium bromide 2.5 mcg/actuation inhaler 2 puff Daily       Objective:     Vital Signs (Most Recent):  Temp: (P) 97.7 °F (36.5 °C) (03/23/23 0730)  Pulse: 62 (03/23/23 0730)  Resp: (!) 46 (03/23/23 0730)  BP: (!) 154/72 (03/23/23 0730)  SpO2: 100 % (03/23/23 0730)   Vital Signs (24h Range):  Temp:  [97.4 °F (36.3 °C)-98.1 °F (36.7 °C)] (P) 97.7 °F (36.5 °C)  Pulse:  [62-94] 62  Resp:  [13-65] 46  SpO2:  [91 %-100 %] 100 %  BP: (139-240)/() 154/72     Weight: 70.3 kg (155 lb) (03/22/23 2115)  Body mass index is 22.89 kg/m².  Body surface area  is 1.85 meters squared.    I/O last 3 completed shifts:  In: -   Out: 4500 [Other:4500]    Physical Exam  Vitals and nursing note reviewed.   Constitutional:       General: He is not in acute distress.     Appearance: He is ill-appearing.   HENT:      Mouth/Throat:      Mouth: Mucous membranes are dry.   Eyes:      Extraocular Movements: Extraocular movements intact.      Conjunctiva/sclera: Conjunctivae normal.   Cardiovascular:      Rate and Rhythm: Normal rate and regular rhythm.      Pulses: Normal pulses.   Pulmonary:      Effort: Pulmonary effort is normal. No respiratory distress.      Breath sounds: No wheezing or rales.   Abdominal:      General: Bowel sounds are normal. There is no distension.      Palpations: Abdomen is soft.      Tenderness: There is no abdominal tenderness.   Musculoskeletal:      Cervical back: Neck supple.      Right lower leg: No edema.      Left lower leg: No edema.   Skin:     General: Skin is warm and dry.   Neurological:      General: No focal deficit present.      Mental Status: He is alert. Mental status is at baseline.   Psychiatric:         Behavior: Behavior is cooperative.       Significant Labs:  CBC:   Recent Labs   Lab 03/23/23  0410   WBC 5.02   RBC 2.43*   HGB 7.2*   HCT 21.6*      MCV 89   MCH 29.6   MCHC 33.3     CMP:   Recent Labs   Lab 03/23/23  0410   GLU 60*   CALCIUM 8.3*   ALBUMIN 2.7*   PROT 6.0   *   K 3.6   CO2 24   CL 99   BUN 25*   CREATININE 2.4*   ALKPHOS 205*   ALT 22   AST 25   BILITOT 0.7          Assessment/Plan:     Cardiac/Vascular  * Hypertensive emergency  -Improved, weaned off Cardene gtt  -4L net fluid removal with HD this morning  -continue Carvedilol 6.25 mg BID; hydralazine 100 mg q 8 hours; nifedipine 90 mg QD    Renal/  Chronic kidney disease-mineral and bone disorder  -Low phos diet   -No indication for phos binders at this time   -Trend phos levels with daily labs     ESRD (end stage renal disease)  Admitted for  Hypertensive urgency and hyperglycemia. Chest x ray with bilateral edema. Echo on 3/20 EF 55%, CVP 15    Outpatient HD Information:     -Outpatient HD unit: [facility]   -HD tx days: MWF   -HD tx time: 4hrs   -Last HD tx prior to presentation: 3/20/23  -HD access: LUE AVF   -HD modality: iHD   -Residual urine: Anuric      3/22:  HD completed, net UF of 4L.     Plan/Recommendations:     -no further plans for RRT today, next HD tomorrow  -Renal diet, if not NPO   -Strict I/O's and daily weights  -Daily renal function panels   -Renally dose meds    Oncology  Anemia in ESRD (end-stage renal disease)  - Hgb goal 10-11  - defer epo at this time in setting of hypertensive urgency       Juventino Nguyen NP  Nephrology  [facility]

## 2023-03-23 NOTE — PLAN OF CARE
[facility] DAILY GOALS       A: Awake    RASS: Goal -    Actual -     Restraint necessity:    B: Breathe   SBT: Not intubated   C: Coordinate A & B, analgesics/sedatives   Pain: managed    SAT: Not intubated  D: Delirium   CAM-ICU: Overall CAM-ICU: Negative  E: Early(intubated/ Progressive (non-intubated) Mobility   MOVE Screen: Pass   Activity: Activity Management: Rolling - L1  FAS: Feeding/Nutrition   Diet order: Diet/Nutrition Received: clear liquid,    T: Thrombus   DVT prophylaxis: VTE Required Core Measure: Pharmacological prophylaxis initiated/maintained  H: HOB Elevation   Head of Bed (HOB) Positioning: HOB at 20-30 degrees  U: Ulcer Prophylaxis   GI: yes  G: Glucose control   managed Glycemic Management: blood glucose monitored  S: Skin   Bathing/Skin Care: dressed/undressed, linen changed, bath, complete              B: Bowel Function   no issues   I: Indwelling Catheters   Dutta necessity:     CVC necessity: Yes  D: De-escalation Antibiotics   Yes    Family/Goals of care/Code Status   Code Status: Full Code    24H Vital Sign Range  Temp:  [97.4 °F (36.3 °C)-98.1 °F (36.7 °C)]   Pulse:  [62-94]   Resp:  [13-65]   BP: (139-240)/()   SpO2:  [91 %-100 %]      Shift Events   Admitted to MICU from ed, monitored insulin and drip and transitioned pt to subq insulin, titration of cardene drip to sustain SBP <180, received dialysis and 4L removed, management of comfort and pain    VS and assessment per flow sheet, patient progressing towards goals as tolerated, plan of care reviewed with family, all concerns addressed, will continue to monitor.    Jia Velazquez

## 2023-03-23 NOTE — ASSESSMENT & PLAN NOTE
Hypertensive in ED with SBP up to 240's.  On nifedipine, coreg, hydralazine, clonidine patch, imdur at home per chart review.  Started on cardene gtt in ED.      -- Titrate cardene gtt for goal SBP <180  -- Continue home meds and home clonidine patch  -- Nephrology consulted for HD reports he does MWF, HD done on 3/23

## 2023-03-23 NOTE — ASSESSMENT & PLAN NOTE
Patient with Hypoxic Respiratory failure which is Acute on chronic.  he is on home oxygen at 3 LPM. Supplemental oxygen was provided and noted-  .   Signs/symptoms of respiratory failure include- tachypnea and increased work of breathing. Contributing diagnoses includes - COPD Labs and images were reviewed. Patient Has recent ABG, which has been reviewed. Will treat underlying causes and adjust management of respiratory failure as follows:  Chest xray with appearance of volume overload; rales on exam. Stable on 4L nasal cannula in the ED.    --Nephrology consulted for HD; volume removal  --BP control; Cardene drip  --Wean FiO2 for SpO2>88%, now on room air  --Home inhalers; PRN nebs

## 2023-03-23 NOTE — ASSESSMENT & PLAN NOTE
-Improved, weaned off Cardene gtt  -4L net fluid removal with HD this morning  -continue Carvedilol 6.25 mg BID; hydralazine 100 mg q 8 hours; nifedipine 90 mg QD

## 2023-03-23 NOTE — SUBJECTIVE & OBJECTIVE
Interval History/Significant Events: Weaned off insulin gtt overnight, hypoglycemic this am started on diet BG improving.  Cardene gtt weaning, resumed home antihypertensives.      Review of Systems   HENT:  Positive for nosebleeds.    Respiratory:  Negative for shortness of breath and wheezing.    Cardiovascular:  Negative for chest pain and palpitations.   Gastrointestinal:  Negative for abdominal pain and nausea.   Objective:     Vital Signs (Most Recent):  Temp: 97.7 °F (36.5 °C) (03/23/23 0730)  Pulse: 65 (03/23/23 1200)  Resp: (!) 22 (03/23/23 1200)  BP: (!) 190/86 (03/23/23 1200)  SpO2: 97 % (03/23/23 1200)   Vital Signs (24h Range):  Temp:  [97.4 °F (36.3 °C)-98.1 °F (36.7 °C)] 97.7 °F (36.5 °C)  Pulse:  [61-80] 65  Resp:  [13-65] 22  SpO2:  [90 %-100 %] 97 %  BP: (139-240)/() 190/86   Weight: 70.3 kg (155 lb)  Body mass index is 22.89 kg/m².      Intake/Output Summary (Last 24 hours) at 3/23/2023 1234  Last data filed at 3/23/2023 0115  Gross per 24 hour   Intake --   Output 4500 ml   Net -4500 ml       Physical Exam  Vitals and nursing note reviewed.   Constitutional:       General: He is not in acute distress.     Appearance: He is ill-appearing.      Comments: Chronically ill appearing   HENT:      Mouth/Throat:      Mouth: Mucous membranes are dry.   Eyes:      Pupils: Pupils are equal, round, and reactive to light.   Cardiovascular:      Rate and Rhythm: Normal rate and regular rhythm.      Pulses: Normal pulses.   Pulmonary:      Effort: Pulmonary effort is normal. No respiratory distress.      Breath sounds: Rales present. No wheezing.   Abdominal:      General: Bowel sounds are normal. There is no distension.      Palpations: Abdomen is soft.      Tenderness: There is no abdominal tenderness. There is no guarding.   Musculoskeletal:      Cervical back: Neck supple.      Right lower leg: No edema.      Left lower leg: No edema.   Skin:     General: Skin is warm and dry.      Capillary Refill:  Capillary refill takes less than 2 seconds.   Neurological:      General: No focal deficit present.      Mental Status: He is alert. Mental status is at baseline.   Psychiatric:         Mood and Affect: Mood normal. Affect is tearful.         Speech: Speech is tangential.         Behavior: Behavior normal. Behavior is cooperative.       Vents:     Lines/Drains/Airways       Drain  Duration                  Hemodialysis AV Fistula Left upper arm -- days              Peripheral Intravenous Line  Duration                  Peripheral IV - Single Lumen 03/22/23 1032 22 G Posterior;Right Hand 1 day         Peripheral IV - Single Lumen 03/22/23 1033 22 G Distal;Left;Posterior Forearm 1 day                  Significant Labs:    CBC/Anemia Profile:  Recent Labs   Lab 03/21/23 2038 03/22/23  1018 03/23/23  0410   WBC 4.72 5.02 5.02   HGB 7.7* 7.5* 7.2*   HCT 24.4* 23.9* 21.6*    197 186   MCV 93 94 89   RDW 14.9* 15.2* 14.6*        Chemistries:  Recent Labs   Lab 03/21/23 2038 03/22/23  1018 03/22/23  1341 03/22/23  1944 03/22/23  2330 03/23/23  0410   * 131* 131* 130* 132* 133*   K 4.5 4.8 5.1 4.6 3.5 3.6    99 100 100 98 99   CO2 23 21* 19* 17* 25 24   BUN 45* 53* 58* 59* 33* 25*   CREATININE 4.0* 4.6* 4.6* 4.8* 2.8* 2.4*   CALCIUM 8.4* 8.3* 8.4* 8.2* 8.6* 8.3*   ALBUMIN 2.8* 2.9*  --   --   --  2.7*   PROT 6.2 6.3  --   --   --  6.0   BILITOT 0.7 0.8  --   --   --  0.7   ALKPHOS 205* 202*  --   --   --  205*   ALT 25 23  --   --   --  22   AST 29 24  --   --   --  25   MG  --   --  2.1  --   --  1.8   PHOS  --   --  4.6*  4.6*  --   --  2.8       All pertinent labs within the past 24 hours have been reviewed.    Significant Imaging:  I have reviewed all pertinent imaging results/findings within the past 24 hours.

## 2023-03-23 NOTE — PROGRESS NOTES
[facility]  Critical Care Medicine  Progress Note    Patient Name: Cosme Lewis  MRN: [id number]  Admission Date: 3/22/2023  Hospital Length of Stay: 1 days  Code Status: Full Code  Attending Provider: Marquis Fernández MD  Primary Care Provider: Primary Doctor No   Principal Problem: Hypertensive emergency    Subjective:     HPI:  Mr. Cosme Lewis is 57 y.o. man with a history of ESRD on HD, DM, HTN, and COPD who [place] [facility] [place] with complaints of shortness of breath and recurrent epistaxis.  He was in the ED the day prior with epistaxis that resolved and was discharged home. Found to be hyperglycemic without AG elevation or elevated BHB.  Hypertensive with SBP up to 240's and started on cardene gtt.  Critical care medicine consulted due to hypertensive emergency and hyperglycemia and patient admitted to MICU.        Hospital/ICU Course:  Admitted to MICU on insulin and cardene gtt.  Transitioned off insulin overnight, some issues with hypoglycemia which having improved since initiating diet.  Weaning of cardene and resumed home medications.        Interval History/Significant Events: Weaned off insulin gtt overnight, hypoglycemic this am started on diet BG improving.  Cardene gtt weaning, resumed home antihypertensives.      Review of Systems   HENT:  Positive for nosebleeds.    Respiratory:  Negative for shortness of breath and wheezing.    Cardiovascular:  Negative for chest pain and palpitations.   Gastrointestinal:  Negative for abdominal pain and nausea.   Objective:     Vital Signs (Most Recent):  Temp: 97.7 °F (36.5 °C) (03/23/23 0730)  Pulse: 65 (03/23/23 1200)  Resp: (!) 22 (03/23/23 1200)  BP: (!) 190/86 (03/23/23 1200)  SpO2: 97 % (03/23/23 1200)   Vital Signs (24h Range):  Temp:  [97.4 °F (36.3 °C)-98.1 °F (36.7 °C)] 97.7 °F (36.5 °C)  Pulse:  [61-80] 65  Resp:  [13-65] 22  SpO2:  [90 %-100 %] 97 %  BP: (139-240)/() 190/86   Weight: 70.3 kg (155 lb)  Body mass index is 22.89  kg/m².      Intake/Output Summary (Last 24 hours) at 3/23/2023 1234  Last data filed at 3/23/2023 0115  Gross per 24 hour   Intake --   Output 4500 ml   Net -4500 ml       Physical Exam  Vitals and nursing note reviewed.   Constitutional:       General: He is not in acute distress.     Appearance: He is ill-appearing.      Comments: Chronically ill appearing   HENT:      Mouth/Throat:      Mouth: Mucous membranes are dry.   Eyes:      Pupils: Pupils are equal, round, and reactive to light.   Cardiovascular:      Rate and Rhythm: Normal rate and regular rhythm.      Pulses: Normal pulses.   Pulmonary:      Effort: Pulmonary effort is normal. No respiratory distress.      Breath sounds: Rales present. No wheezing.   Abdominal:      General: Bowel sounds are normal. There is no distension.      Palpations: Abdomen is soft.      Tenderness: There is no abdominal tenderness. There is no guarding.   Musculoskeletal:      Cervical back: Neck supple.      Right lower leg: No edema.      Left lower leg: No edema.   Skin:     General: Skin is warm and dry.      Capillary Refill: Capillary refill takes less than 2 seconds.   Neurological:      General: No focal deficit present.      Mental Status: He is alert. Mental status is at baseline.   Psychiatric:         Mood and Affect: Mood normal. Affect is tearful.         Speech: Speech is tangential.         Behavior: Behavior normal. Behavior is cooperative.       Vents:     Lines/Drains/Airways       Drain  Duration                  Hemodialysis AV Fistula Left upper arm -- days              Peripheral Intravenous Line  Duration                  Peripheral IV - Single Lumen 03/22/23 1032 22 G Posterior;Right Hand 1 day         Peripheral IV - Single Lumen 03/22/23 1033 22 G Distal;Left;Posterior Forearm 1 day                  Significant Labs:    CBC/Anemia Profile:  Recent Labs   Lab 03/21/23  2038 03/22/23  1018 03/23/23  0410   WBC 4.72 5.02 5.02   HGB 7.7* 7.5* 7.2*   HCT  24.4* 23.9* 21.6*    197 186   MCV 93 94 89   RDW 14.9* 15.2* 14.6*        Chemistries:  Recent Labs   Lab 03/21/23 2038 03/22/23  1018 03/22/23  1341 03/22/23  1944 03/22/23  2330 03/23/23  0410   * 131* 131* 130* 132* 133*   K 4.5 4.8 5.1 4.6 3.5 3.6    99 100 100 98 99   CO2 23 21* 19* 17* 25 24   BUN 45* 53* 58* 59* 33* 25*   CREATININE 4.0* 4.6* 4.6* 4.8* 2.8* 2.4*   CALCIUM 8.4* 8.3* 8.4* 8.2* 8.6* 8.3*   ALBUMIN 2.8* 2.9*  --   --   --  2.7*   PROT 6.2 6.3  --   --   --  6.0   BILITOT 0.7 0.8  --   --   --  0.7   ALKPHOS 205* 202*  --   --   --  205*   ALT 25 23  --   --   --  22   AST 29 24  --   --   --  25   MG  --   --  2.1  --   --  1.8   PHOS  --   --  4.6*  4.6*  --   --  2.8       All pertinent labs within the past 24 hours have been reviewed.    Significant Imaging:  I have reviewed all pertinent imaging results/findings within the past 24 hours.      ABG  Recent Labs   Lab 03/21/23 2043   PH 7.311*   PO2 45   PCO2 44.2   HCO3 22.3*   BE -4     Assessment/Plan:     ENT  Epistaxis  Reported recurrent epistaxis which pt was seen for in ED on 3/21 and 3/22.  Patient received afrin and bleeding resolved.  Suspect secondary to hypertensive emergency and use of blood thinners.    -- Trend CBC  -- Blood pressure control cardene gtt titrated off and home PO meds resumed   -- Hold anticoagulation at this time, resume when appropriate    Pulmonary  Chronic hypoxemic respiratory failure  Patient with Hypoxic Respiratory failure which is Acute on chronic.  he is on home oxygen at 3 LPM. Supplemental oxygen was provided and noted-  .   Signs/symptoms of respiratory failure include- tachypnea and increased work of breathing. Contributing diagnoses includes - COPD Labs and images were reviewed. Patient Has recent ABG, which has been reviewed. Will treat underlying causes and adjust management of respiratory failure as follows:  Chest xray with appearance of volume overload; rales on exam.  Stable on 4L nasal cannula in the ED.    --Nephrology consulted for HD; volume removal  --BP control; Cardene drip  --Wean FiO2 for SpO2>88%, now on room air  --Home inhalers; PRN nebs    Cardiac/Vascular  * Hypertensive emergency  Hypertensive in ED with SBP up to 240's.  On nifedipine, coreg, hydralazine, clonidine patch, imdur at home per chart review.  Started on cardene gtt in ED.      -- Titrate cardene gtt for goal SBP <180  -- Continue home meds and home clonidine patch  -- Nephrology consulted for HD reports he does MWF, HD done on 3/23    HLD (hyperlipidemia)  Continue home statin.      Chronic diastolic heart failure  Echo 3/19 with EF 55% with grade 2 LV diastolic dysfunction. Pulmonary hypertension noted.      Renal/  ESRD (end stage renal disease)  ESRD on HD MWF.  Reports last HD on Monday he missed today given his admission.    -- Nephrology consulted for HD  -- Renally dose meds  -- Avoid nephrotoxins  -- Serial BMPs    Hematology  Multiple subsegmental pulmonary emboli without acute cor pulmonale  Hold home apixaban in the setting of epistaxis, resume when appropriate.      Oncology  Anemia in ESRD (end-stage renal disease)  Epistaxis on admission, Hgb stable and bleeding now controlled.      -- CBC daily  -- Type and screen  -- Transfuse for Hgb <7  -- Epoetin T, TH, Sat    Endocrine  Type 2 diabetes mellitus with hyperglycemia, with long-term current use of insulin  Hyperglycemic on admission without elevated AG.  Now off gtt, some issues with hypoglycemia overnight requiring dextrose bolus, diet started BG improving with PO intake.        -- HbA1c on 3/6 9.5  -- Transitioned of insulin gtt, continue long acting and SSI  -- Hypoglycemia order set  -- DM diet        Critical Care Time: 40 minutes  Critical secondary to Patient has a condition that poses threat to life and bodily function: Hypertensive Emergency    Plan discussed with Dr. Fernández.        Critical care was time spent personally by me  on the following activities: development of treatment plan with patient or surrogate and bedside caregivers, discussions with consultants, evaluation of patient's response to treatment, examination of patient, ordering and performing treatments and interventions, ordering and review of laboratory studies, ordering and review of radiographic studies, pulse oximetry, re-evaluation of patient's condition. This critical care time did not overlap with that of any other provider or involve time for any procedures.     Alissa Denny NP  Critical Care Medicine  [facility] - Cardiac Medical ICU

## 2023-03-23 NOTE — HOSPITAL COURSE
Admitted to MICU on insulin and cardene gtt.  Transitioned off insulin overnight, some issues with hypoglycemia which having improved since initiating diet.  Weaning of cardene and resumed home medications.

## 2023-03-23 NOTE — PROGRESS NOTES
03/23/23 0115   Vital Signs   Temp 97.4 °F (36.3 °C)   Temp Source Oral   Pulse 62   Resp (!) 25   SpO2 100 %   Pulse Oximetry Type Continuous   BP (!) 155/65   MAP (mmHg) 94   BP Location Right arm   BP Method Automatic   Patient Position Lying     HD tx completed and pt tolerated well.  Net UF of 4L.  Pt is stable.

## 2023-03-23 NOTE — PLAN OF CARE
Labile emotions- very aggressive when cleaning penis-kicking, grabbing, screaming, crying-second nurse needed to ensure pt does not hit; calm otherwise; BP reading the best in right arm vs BLE-instructed to just use right arm per NP; informed NP and MD of sores on pt's penis-no new orders received; barrier cream applied to penis for protection from stool; work order placed for broken TV (no service); blood sugar labile as well; a

## 2023-03-23 NOTE — ASSESSMENT & PLAN NOTE
Reported recurrent epistaxis which pt was seen for in ED on 3/21 and 3/22.  Patient received afrin and bleeding resolved.  Suspect secondary to hypertensive emergency and use of blood thinners.    -- Trend CBC  -- Blood pressure control cardene gtt titrated off and home PO meds resumed   -- Hold anticoagulation at this time, resume when appropriate

## 2023-03-23 NOTE — ASSESSMENT & PLAN NOTE
Hyperglycemic on admission without elevated AG.  Now off gtt, some issues with hypoglycemia overnight requiring dextrose bolus, diet started BG improving with PO intake.        -- HbA1c on 3/6 9.5  -- Transitioned of insulin gtt, continue long acting and SSI  -- Hypoglycemia order set  -- DM diet

## 2023-03-23 NOTE — SUBJECTIVE & OBJECTIVE
Interval History:   HD completed earlier this morning with net ultrafiltration of 4L.  Weaned off cardene gtt.  Electrolytes non-emergent, NAD on exam.    Review of patient's allergies indicates:  No Known Allergies  Current Facility-Administered Medications   Medication Frequency    0.9%  NaCl infusion PRN    albuterol-ipratropium 2.5 mg-0.5 mg/3 mL nebulizer solution 3 mL Q4H PRN    aspirin EC tablet 81 mg Daily    atorvastatin tablet 40 mg Daily    carvediloL tablet 6.25 mg BID    dextrose 10% bolus 125 mL 125 mL PRN    dextrose 10% bolus 250 mL 250 mL PRN    FLUoxetine capsule 40 mg Daily    fluticasone furoate-vilanteroL 100-25 mcg/dose diskus inhaler 1 puff Daily    glucagon (human recombinant) injection 1 mg PRN    hydrALAZINE tablet 100 mg Q8H    insulin aspart U-100 pen 0-5 Units Q4H PRN    insulin detemir U-100 (Levemir) pen 4 Units BID    melatonin tablet 6 mg Nightly PRN    mupirocin 2 % ointment BID    niCARdipine 40 mg/200 mL (0.2 mg/mL) infusion Continuous    NIFEdipine 24 hr tablet 90 mg Daily    sodium chloride 0.9% flush 10 mL PRN    tiotropium bromide 2.5 mcg/actuation inhaler 2 puff Daily       Objective:     Vital Signs (Most Recent):  Temp: (P) 97.7 °F (36.5 °C) (03/23/23 0730)  Pulse: 62 (03/23/23 0730)  Resp: (!) 46 (03/23/23 0730)  BP: (!) 154/72 (03/23/23 0730)  SpO2: 100 % (03/23/23 0730)   Vital Signs (24h Range):  Temp:  [97.4 °F (36.3 °C)-98.1 °F (36.7 °C)] (P) 97.7 °F (36.5 °C)  Pulse:  [62-94] 62  Resp:  [13-65] 46  SpO2:  [91 %-100 %] 100 %  BP: (139-240)/() 154/72     Weight: 70.3 kg (155 lb) (03/22/23 2115)  Body mass index is 22.89 kg/m².  Body surface area is 1.85 meters squared.    I/O last 3 completed shifts:  In: -   Out: 4500 [Other:4500]    Physical Exam  Vitals and nursing note reviewed.   Constitutional:       General: He is not in acute distress.     Appearance: He is ill-appearing.   HENT:      Mouth/Throat:      Mouth: Mucous membranes are dry.   Eyes:       Extraocular Movements: Extraocular movements intact.      Conjunctiva/sclera: Conjunctivae normal.   Cardiovascular:      Rate and Rhythm: Normal rate and regular rhythm.      Pulses: Normal pulses.   Pulmonary:      Effort: Pulmonary effort is normal. No respiratory distress.      Breath sounds: No wheezing or rales.   Abdominal:      General: Bowel sounds are normal. There is no distension.      Palpations: Abdomen is soft.      Tenderness: There is no abdominal tenderness.   Musculoskeletal:      Cervical back: Neck supple.      Right lower leg: No edema.      Left lower leg: No edema.   Skin:     General: Skin is warm and dry.   Neurological:      General: No focal deficit present.      Mental Status: He is alert. Mental status is at baseline.   Psychiatric:         Behavior: Behavior is cooperative.       Significant Labs:  CBC:   Recent Labs   Lab 03/23/23  0410   WBC 5.02   RBC 2.43*   HGB 7.2*   HCT 21.6*      MCV 89   MCH 29.6   MCHC 33.3     CMP:   Recent Labs   Lab 03/23/23  0410   GLU 60*   CALCIUM 8.3*   ALBUMIN 2.7*   PROT 6.0   *   K 3.6   CO2 24   CL 99   BUN 25*   CREATININE 2.4*   ALKPHOS 205*   ALT 22   AST 25   BILITOT 0.7

## 2023-03-23 NOTE — RESIDENT HANDOFF
ICU Transfer of Care Note  Critical Care Medicine    Admit Date: 3/22/2023  LOS: 1    CC: Hypertensive emergency    Code Status: Full Code         HPI and Hospital Course:     HPI:  Mr. Cosme Lewis is 57 y.o. man with a history of ESRD on HD, DM, HTN, and COPD who presented to [facility] ED on 3/22 with complaints of shortness of breath and recurrent epistaxis.  He was in the ED the day prior with epistaxis that resolved and was discharged home. Found to be hyperglycemic without AG elevation or elevated BHB.  Hypertensive with SBP up to 240's and started on cardene gtt.  Critical care medicine consulted due to hypertensive emergency and hyperglycemia and patient admitted to MICU.      Hospital/ICU Course:  Admitted to MICU on insulin and cardene gtt.  Transitioned off insulin overnight, some issues with hypoglycemia which having improved since initiating diet.  Weaning of cardene and resumed home medications.        To Follow Up:     -- Nephrology recs  -- PT/OT recs  -- Resumed home BP meds, BP improved  -- BG      Discharge Plan:     Will likely DC back to [facility].      Call [phone] with questions.     Alissa AVILA-ACNESTEFANIA  Pulmonary Critical Care Medicine  03/23/2023  4:40 PM

## 2023-03-23 NOTE — PROGRESS NOTES
03/22/23 2145   Vital Signs   Temp 97.7 °F (36.5 °C)   Temp Source Oral   Pulse 70   Heart Rate Source Monitor   Resp (!) 22   SpO2 99 %   Pulse Oximetry Type Continuous   Flow (L/min) 3   Device (Oxygen Therapy) nasal cannula   BP (!) 154/67   MAP (mmHg) 96   BP Location Right arm   BP Method Automatic   Patient Position Lying     Bedside HD 1:1 tx initiated via LUE AVF.  UF goal of 4L as tolerated.   Pt is alert and oriented.

## 2023-03-24 NOTE — PT/OT/SLP EVAL
Occupational Therapy   Co-Evaluation/Discharge    Name: Cosme Lewis  MRN: [id number]  Admitting Diagnosis: Hypertensive emergency  Recent Surgery: * No surgery found *      Recommendations:     Discharge Recommendations: nursing facility, basic     Assessment:     Cosme Lewis is a 57 y.o. male with a medical diagnosis of Hypertensive emergency. Performance deficits affecting function: decreased function of LE and UE .  Pt declined participating in therapy and became increasingly agitated. Pt perseverated on wanting grits. Pt is functioning at [facility] currently. Pt anticipated to be able to go back to nursing home when medically appropriate for D/C. Co-eval completed to optimize functional performance and safety given pt impaired tolerance for activity in the setting of the ICU. Pt being D/C from OT services this date.    Plan:     Plan to D/C pt from OT services this date.   Plan of Care Expires:  3/24/23  Plan of Care Reviewed with: patient    Subjective     Chief Complaint: Pt wanted more grits and stated this repeatedly.     Occupational Profile:  Living Environment: Pt lives in NH with a walk-in shower.   Previous level of function: Pt is I in UE dressing, feeding, and grooming. Pt needs assistance with LE dressing and showering. Pt uses wheelchair and is assisted in t/f PTA.   Equipment Used at Home: wheelchair  Assistance upon Discharge: Pt will have assistance from NH upon D/C.    Pain/Comfort:  Pain Rating 1: 0/10    Patients cultural, spiritual, Buddhist conflicts given the current situation: no    Objective:     Communicated with: nsg prior to session.  Patient found supine with oxygen, telemetry, pulse ox (continuous), blood pressure cuff upon OT entry to room.    General Precautions: Standard, fall  Respiratory Status: Nasal cannula, flow 2 L/min    Occupational Performance:    Bed Mobility:    Pt declined to complete bed mobility.     Functional Mobility/Transfers:  Pt declined to complete.     Activities of  Daily Living:  Feeding:  Pt drank coffee with supervision in bed     Cognitive/Visual Perceptual:  Pt AxO x 4.   Pt vision intact.   Pt [place], L ear is better.     Physical Exam:  Pt B UE ROM and strength WFL as seen through functional use.   Pt  strength is functional as seen through functional use.     AMPAC 6 Click ADL:  AMPAC Total Score: 17    Treatment & Education:  Ed pt with OT POC and safety with functional mobility and ADL skills.     Patient left supine with all lines intact, call button in reach, and nsg notified    GOALS:   Multidisciplinary Problems       Occupational Therapy Goals       Not on file              Multidisciplinary Problems (Resolved)          Problem: Occupational Therapy    Goal Priority Disciplines Outcome Interventions   Occupational Therapy Goal   (Resolved)     OT, PT/OT Met                        History:     Past Medical History:   Diagnosis Date    Chronic kidney disease-mineral and bone disorder 10/12/2022    COPD (chronic obstructive pulmonary disease)     Diabetes mellitus type 1     ESRD on dialysis     Hypertension     Hypervolemia 2/22/2023    Hyponatremia 3/4/2023    SOB (shortness of breath) 10/12/2022    Patient with history COPD treated with Ellipta the albuterol, fluticasone-salmeterol tachypneic in the ED saturating 94% and 6 L on nasal cannula, patient does not know how many  he uses it is in nursing home.    -duo nebs Q 4  Given that patient is a poor historian, and in the setting of left lower extremity edema and history of coagulopathy PE cannot be ruled out.  Will workup for possible infec         Past Surgical History:   Procedure Laterality Date    AV FISTULA PLACEMENT         Time Tracking:     OT Date of Treatment: 03/24/23  OT Start Time: 0806  OT Stop Time: 0816  OT Total Time (min): 10 min    Billable Minutes:Evaluation 10    3/24/2023

## 2023-03-24 NOTE — PROGRESS NOTES
[facility] Medical [facility]  Nephrology  Progress Note    Patient Name: Cosme Lewis  MRN: [id number]  Admission Date: 3/22/2023  Hospital Length of Stay: 2 days  Attending Provider: Marquis Fernández MD   Primary Care Physician: Primary Doctor No  Principal Problem:Hypertensive emergency    Subjective:     Interval History:   NAEON.  Step down orders in-place.  Due for HD today.    Review of patient's allergies indicates:  No Known Allergies  Current Facility-Administered Medications   Medication Frequency    0.9%  NaCl infusion PRN    albuterol-ipratropium 2.5 mg-0.5 mg/3 mL nebulizer solution 3 mL Q4H PRN    aspirin EC tablet 81 mg Daily    atorvastatin tablet 40 mg Daily    carvediloL tablet 6.25 mg BID    cloNIDine 0.3 mg/24 hr td ptwk 1 patch Q7 Days    dextrose 10% bolus 125 mL 125 mL PRN    dextrose 10% bolus 250 mL 250 mL PRN    FLUoxetine capsule 40 mg Daily    fluticasone furoate-vilanteroL 100-25 mcg/dose diskus inhaler 1 puff Daily    glucagon (human recombinant) injection 1 mg PRN    hydrALAZINE injection 10 mg Q8H PRN    insulin aspart U-100 pen 0-5 Units QID (AC + HS) PRN    insulin aspart U-100 pen 3 Units TIDWM    insulin detemir U-100 (Levemir) pen 6 Units BID    lisinopriL tablet 20 mg Daily    melatonin tablet 6 mg Nightly PRN    mupirocin 2 % ointment BID    NIFEdipine 24 hr tablet 90 mg Daily    sodium chloride 0.9% flush 10 mL PRN    tiotropium bromide 2.5 mcg/actuation inhaler 2 puff Daily       Objective:     Vital Signs (Most Recent):  Temp: 98.1 °F (36.7 °C) (03/24/23 0701)  Pulse: 73 (03/24/23 0800)  Resp: (!) 29 (03/24/23 0800)  BP: (!) 162/79 (03/24/23 0800)  SpO2: (!) 93 % (03/24/23 0800)   Vital Signs (24h Range):  Temp:  [97.4 °F (36.3 °C)-98.5 °F (36.9 °C)] 98.1 °F (36.7 °C)  Pulse:  [61-76] 73  Resp:  [12-54] 29  SpO2:  [89 %-99 %] 93 %  BP: (107-221)/() 162/79     Weight: 70.3 kg (155 lb) (03/22/23 2115)  Body mass index is 22.89 kg/m².  Body surface area is  1.85 meters squared.    I/O last 3 completed shifts:  In: 666 [P.O.:666]  Out: 4500 [Other:4500]    Physical Exam  Vitals and nursing note reviewed.   Constitutional:       General: He is not in acute distress.     Appearance: He is ill-appearing.   HENT:      Mouth/Throat:      Mouth: Mucous membranes are dry.   Eyes:      Extraocular Movements: Extraocular movements intact.      Conjunctiva/sclera: Conjunctivae normal.   Cardiovascular:      Rate and Rhythm: Normal rate and regular rhythm.      Pulses: Normal pulses.   Pulmonary:      Effort: Pulmonary effort is normal. No respiratory distress.      Breath sounds: No wheezing or rales.   Abdominal:      General: Bowel sounds are normal. There is no distension.      Palpations: Abdomen is soft.      Tenderness: There is no abdominal tenderness.   Musculoskeletal:      Cervical back: Neck supple.      Right lower leg: No edema.      Left lower leg: No edema.   Skin:     General: Skin is warm and dry.   Neurological:      General: No focal deficit present.      Mental Status: He is alert and oriented to person, place, and time. Mental status is at baseline.   Psychiatric:         Behavior: Behavior is cooperative.       Significant Labs:  CBC:   Recent Labs   Lab 03/24/23  0236   WBC 6.53   RBC 2.43*   HGB 7.2*   HCT 22.2*      MCV 91   MCH 29.6   MCHC 32.4     CMP:   Recent Labs   Lab 03/24/23  0236   *   CALCIUM 8.2*   ALBUMIN 2.7*   PROT 5.8*   *   K 5.0   CO2 21*   CL 96   BUN 42*   CREATININE 3.7*   ALKPHOS 186*   ALT 26   AST 28   BILITOT 0.5          Assessment/Plan:     Cardiac/Vascular  * Hypertensive emergency  -Improved, weaned off Cardene gtt  -continue Carvedilol 6.25 mg BID; hydralazine 100 mg q 8 hours; nifedipine 90 mg QD, Lisinopril 20 QD  -hold lisinopril until after HD session on dialysis days    Renal/  Chronic kidney disease-mineral and bone disorder  -Low phos diet   -No indication for phos binders at this time   -Trend  phos levels with daily labs     ESRD (end stage renal disease)  Admitted for Hypertensive urgency and hyperglycemia. Chest x ray with bilateral edema. Echo on 3/20 EF 55%, CVP 15    Outpatient HD Information:     -Outpatient HD unit: [facility]   -HD tx days: MWF   -HD tx time: 4hrs   -Last HD tx prior to presentation: 3/20/23  -HD access: LUE AVF   -HD modality: iHD   -Residual urine: Anuric      3/22:  HD completed, net UF of 4L.     Plan/Recommendations:     -HD today  -UFG 3-4L as tolerated  -please hold lisinopril till after HD  -Renal diet, if not NPO   -Strict I/O's and daily weights  -Daily renal function panels   -Renally dose meds    Oncology  Anemia in ESRD (end-stage renal disease)  - Hgb goal 10-11  - defer epo at this time in setting of hypertensive urgency       Juventino Nguyen, NP  Nephrology  [facility] - Cardiac Medical [facility]

## 2023-03-24 NOTE — ASSESSMENT & PLAN NOTE
Hypertensive in ED with SBP up to 240's.  On nifedipine, coreg, hydralazine, clonidine patch, imdur at home per chart review.  Started on cardene gtt in ED.      -- Titrate cardene gtt for goal SBP <180  -- Continue home meds and home clonidine patch  -- Nephrology consulted for HD reports he does MWF, HD done on 3/23    Off cardene gtt

## 2023-03-24 NOTE — PT/OT/SLP EVAL
"Physical Therapy Evaluation and Discharge Note    Patient Name:  Cosme Lewis   MRN:  [id number]  Admitting Diagnosis:  Hypertensive emergency   Recent Surgery: * No surgery found *    Admit Date: 3/22/2023  Length of Stay: 2 days    Recommendations:     Discharge Recommendations:  nursing facility, basic (return to NH)   Discharge Equipment Recommendations: none   Barriers to discharge: None    Assessment:     Cosme Lewis is a 57 y.o. male admitted with a medical diagnosis of Hypertensive emergency.  At this time, patient is functioning at their prior level of function and does not require further acute PT services. Pt found initially willing to work with PT. However, growing agitation prevented further assessment. PT has no functional goals for pt to achieve at this time. Pt planning to d/c back to NH.    Plan:     During this hospitalization, patient does not require further acute PT services.  Please re-consult if situation changes.      Subjective   Communicated with RN prior to session.  Patient found HOB elevated with telemetry, blood pressure cuff, pulse ox (continuous), peripheral IV, oxygen upon PT entry to room, agreeable to evaluation. Cosme Lewis's alone during session.    Chief Complaint: "I want more grits before I do anything"  Patient/Family Comments/goals: to get better and leave the hospital  Pain/Comfort:  Pain Rating 1: 0/10  Pain Rating Post-Intervention 1: 0/10    Living Environment:  Patient lives in a nursing home with 0 BECCA.   Prior Level of Function: Patient reports being dependent with mobility and transfers requiring a manual WC & with ADLs requiring assistance with dressing, showering. Independent with grooming and feeding. Patient uses DME as follows: wheelchair. DME owned (not currently used): none.      Objective:   Patient found with: telemetry, blood pressure cuff, pulse ox (continuous), peripheral IV, oxygen   General Precautions: Standard, fall   Orthopedic Precautions:    Braces:   " "  Oxygen Device: Nasal Cannula 2L  Vitals: BP (!) 150/77   Pulse 71   Temp 97.6 °F (36.4 °C) (Oral)   Resp 13   Ht 5' 9" (1.753 m)   Wt 70.3 kg (154 lb 15.7 oz)   SpO2 99%   BMI 22.89 kg/m²     Exams:  Cognition:   Oriented X 4 and Alert  Command following: Follows one-step commands  Fluency: clear/fluent  Hearing: Impaired: hard of hearing  Vision:  Intact visual fields    ROM and MMT not performed 2nd to increased pt agitation    Outcome Measures:  AM-PAC 6 CLICK MOBILITY  Turning over in bed (including adjusting bedclothes, sheets and blankets)?: 3  Sitting down on and standing up from a chair with arms (e.g., wheelchair, bedside commode, etc.): 2  Moving from lying on back to sitting on the side of the bed?: 3  Moving to and from a bed to a chair (including a wheelchair)?: 2  Need to walk in hospital room?: 2  Climbing 3-5 steps with a railing?: 2  Basic Mobility Total Score: 14     Functional Mobility:  Additional Staff Present: OT  Not performed 2nd to pt agitation, refusal    Therapeutic Activities, Exercises, & Education:   Pt educated on PT role and POC      Patient left HOB elevated with all lines intact, call button in reach, and RN notified.    GOALS: None identified at this time    History:     Past Medical History:   Diagnosis Date    Chronic kidney disease-mineral and bone disorder 10/12/2022    COPD (chronic obstructive pulmonary disease)     Diabetes mellitus type 1     ESRD on dialysis     Hypertension     Hypervolemia 2/22/2023    Hyponatremia 3/4/2023    SOB (shortness of breath) 10/12/2022    Patient with history COPD treated with Ellipta the albuterol, fluticasone-salmeterol tachypneic in the ED saturating 94% and 6 L on nasal cannula, patient does not know how many  he uses it is in nursing home.    -duo nebs Q 4  Given that patient is a poor historian, and in the setting of left lower extremity edema and history of coagulopathy PE cannot be ruled out.  Will workup for possible infec "       Past Surgical History:   Procedure Laterality Date    AV FISTULA PLACEMENT         Time Tracking:     PT Received On: 03/24/23  PT Start Time: 0806     PT Stop Time: 0817  PT Total Time (min): 11 min     Billable Minutes: Evaluation 11

## 2023-03-24 NOTE — NURSING
Pt completed 3.5 hours of HD. Tolerated well. VSS. NAD. Net 4 liters of fluid removed. Blood returned. Lines disconnected and flushed with NS. Needles pulled and manual pressure held until hemostasis achieved. Report given to ADELITA Fields.

## 2023-03-24 NOTE — PLAN OF CARE
CMICU DAILY GOALS       A: Awake    RASS: Goal -    Actual -     Restraint necessity:    B: Breathe   SBT: Not intubated   C: Coordinate A & B, analgesics/sedatives   Pain: managed    SAT: Not intubated  D: Delirium   CAM-ICU: Overall CAM-ICU: Negative  E: Early(intubated/ Progressive (non-intubated) Mobility   MOVE Screen: Fail   Activity: Activity Management: Rolling - L1  FAS: Feeding/Nutrition   Diet order: Diet/Nutrition Received: consistent carb/diabetic diet,    T: Thrombus   DVT prophylaxis: VTE Required Core Measure: Pharmacological prophylaxis initiated/maintained  H: HOB Elevation   Head of Bed (HOB) Positioning: HOB at 20-30 degrees  U: Ulcer Prophylaxis   GI: yes  G: Glucose control   managed Glycemic Management: blood glucose monitored  S: Skin   Bathing/Skin Care: bath, complete, incontinence care, linen changed        Pressure Reduction Techniques: frequent weight shift encouraged  Skin Protection: adhesive use limited  B: Bowel Function   no issues   I: Indwelling Catheters   Dutta necessity:     CVC necessity: Yes  D: De-escalation Antibiotics   Yes    Family/Goals of care/Code Status   Code Status: Full Code    24H Vital Sign Range  Temp:  [97.4 °F (36.3 °C)-98.5 °F (36.9 °C)]   Pulse:  [61-76]   Resp:  [12-54]   BP: (107-221)/()   SpO2:  [89 %-100 %]      Shift Events   Management of comfort and pain, management of glucose and insulin administration    VS and assessment per flow sheet, patient progressing towards goals as tolerated, plan of care reviewed with family, all concerns addressed, will continue to monitor.    Jia Velazquez

## 2023-03-24 NOTE — ASSESSMENT & PLAN NOTE
-Improved, weaned off Cardene gtt  -continue Carvedilol 6.25 mg BID; hydralazine 100 mg q 8 hours; nifedipine 90 mg QD, Lisinopril 20 QD  -hold lisinopril until after HD session on dialysis days

## 2023-03-24 NOTE — CONSULTS
[facility]  Wound Care    Patient Name:  Cosme Lewis   MRN:  [id number]  Date: 3/24/2023  Diagnosis: Hypertensive emergency    History:     Past Medical History:   Diagnosis Date    Chronic kidney disease-mineral and bone disorder 10/12/2022    COPD (chronic obstructive pulmonary disease)     Diabetes mellitus type 1     ESRD on dialysis     Hypertension     Hypervolemia 2/22/2023    Hyponatremia 3/4/2023    SOB (shortness of breath) 10/12/2022    Patient with history COPD treated with Ellipta the albuterol, fluticasone-salmeterol tachypneic in the ED saturating 94% and 6 L on nasal cannula, patient does not know how many  he uses it is in nursing home.    -duo nebs Q 4  Given that patient is a poor historian, and in the setting of left lower extremity edema and history of coagulopathy PE cannot be ruled out.  Will workup for possible infec       Social History     Socioeconomic History    Marital status:    Tobacco Use    Smoking status: Never    Smokeless tobacco: Never   Substance and Sexual Activity    Alcohol use: Not Currently    Sexual activity: Not Currently       Precautions:     Allergies as of 03/22/2023    (No Known Allergies)       [facility] Assessment Details/Treatment   Wound care consult received for the penis.  The penis has a partial thickness skin loss with a scant amount of serosanguinous drainage. The wound bed is pink and moist. The would is likely due to external male urinary device. The wound was cleansed and triad applied.     Recommendations:  - Penis: nursing to clean with soap and water and apply triad BID  - Turning every 2 hours     03/24/23 1134        Altered Skin Integrity 03/23/23 0730 Penis Ulceration   Date First Assessed/Time First Assessed: 03/23/23 0730   Altered Skin Integrity Present on Admission - Did Patient arrive to the hospital with altered skin?: yes  Location: Penis  Is this injury device related?: No  Primary Wound Type: Ulceration   Dressing  Appearance Open to air   Drainage Amount Scant   Drainage Characteristics/Odor Serosanguineous   Appearance Pink;Red;Moist   Tissue loss description Partial thickness   Periwound Area Intact;Moist   Care Cleansed with:;Soap and water   Dressing Applied;Other (comment)  (TRIAD)       Recommendations made to primary team for above plan via secure chat. Orders placed. Wound care to follow-up as needed.     03/24/2023

## 2023-03-24 NOTE — PLAN OF CARE
[facility] - Cardiac Medical ICU  Initial Discharge Assessment       Primary Care Provider: Primary Doctor No    Admission Diagnosis: Shortness of breath [R06.02]  Primary hypertension [I10]  Hyperglycemia [R73.9]  Recurrent epistaxis [R04.0]  Hypertensive emergency [I16.1]    Admission Date: 3/22/2023  Expected Discharge Date: 3/27/2023    Discharge Barriers Identified: None    Payor: MEDICARE / Plan: MEDICARE PART A & B / Product Type: Government /     Extended Emergency Contact Information  Primary Emergency Contact: Kassandra Leon  Mobile Phone: [phone]  Relation: Mother    Discharge Plan A: Return to nursing home ([facility])  Discharge Plan B: Return to Nursing Home ([facility])      [facility]  [address]  [facility] [address]  Phone: [phone] Fax: [phone]      Transferred from:     Past Medical History:   Diagnosis Date    Chronic kidney disease-mineral and bone disorder 10/12/2022    COPD (chronic obstructive pulmonary disease)     Diabetes mellitus type 1     ESRD on dialysis     Hypertension     Hypervolemia 2/22/2023    Hyponatremia 3/4/2023    SOB (shortness of breath) 10/12/2022    Patient with history COPD treated with Ellipta the albuterol, fluticasone-salmeterol tachypneic in the ED saturating 94% and 6 L on nasal cannula, patient does not know how many  he uses it is in nursing home.    -duo nebs Q 4  Given that patient is a poor historian, and in the setting of left lower extremity edema and history of coagulopathy PE cannot be ruled out.  Will workup for possible infec         CM met with patient in room for Discharge Planning Assessment.  Patient was unable to answer questions due to very hard of hearing and very sleepy. CM spoke with  Kassandra Lewis (mother) [phone]  via phone to complete Discharge Planning Assessment.  Per mother, patient is a current resident at [facility].   Per mother, patient was dependent  with ADLS and is bed bound. Per mother, patient is on dialysis  at [facility] M-W-F, and does not take Coumadin. Patient takes Elequis.  Patient will return to [facility] upon discharge.   Discharge Planning Booklet left in patient's room by CM.  Discharge Planning discussed with Kassandra Lewis (mother) [phone]  via phone.  All questions addressed.  CM will follow for needs.      Initial Assessment (most recent)       Adult Discharge Assessment - 03/24/23 1130          Discharge Assessment    Assessment Type Discharge Planning Assessment     Confirmed/corrected address, phone number and insurance Yes     Confirmed Demographics Correct on Facesheet     Source of Information family     When was your last doctors appointment? 03/10/23     Communicated GERA with patient/caregiver Date not available/Unable to determine     Reason For Admission Hypertensive emergency, shortness of breath     People in Home facility resident     Facility Arrived From: [facility]     Do you expect to return to your current living situation? Yes     Do you have help at home or someone to help you manage your care at home? Yes     Who are your caregiver(s) and their phone number(s)? Staff at [facility]     Prior to hospitilization cognitive status: Alert/Oriented;Not Oriented to Time     Current cognitive status: Unable to Assess;Not Oriented to Time   request CM to call his mother for information due to patient very hard of hearing and sleepy.    Walking or Climbing Stairs ambulation difficulty, dependent;stair climbing difficulty, dependent;transferring difficulty, dependent     Mobility Management patient bed bound     Dressing/Bathing bathing difficulty, dependent;dressing difficulty, dependent     Dressing/Bathing Management Staff at [facility] assists in these tasks     Equipment Currently Used at Home wheelchair     Readmission within 30 days? Yes   [place] [facility]     Patient currently being followed by outpatient case management? No     Do you currently have service(s) that help you manage your care at home? No     Do you take prescription medications? Yes     Do you have prescription coverage? Yes     Coverage MEDICARE - MEDICARE PART A & B     Do you have any problems affording any of your prescribed medications? No     Is the patient taking medications as prescribed? yes     Who is going to help you get home at discharge? Patient will return to [facility] per Kassandra Lewis (mother) [phone]     How do you get to doctors appointments? other (see comments)   Facility resident    Are you on dialysis? Yes     Dialysis Name and Scheduled days [facility] M[facility]     Do you take coumadin? No     Discharge Plan A Return to nursing [facility]    Discharge [facility] B [facility]    DME Needed Upon Discharge  none     Discharge Plan discussed with: Parent(s)     Name(s) and Number(s) Kassandra Lewis (mother) [phone]     Discharge Barriers Identified None        Physical Activity    On average, how many days per week do you engage in moderate to strenuous exercise (like a brisk walk)? --   n/a nursing home resident    On average, how many minutes do you engage in exercise at this level? --   n/a nursing home resident       Financial Resource Strain    How hard is it for you to pay for the very basics like food, housing, medical care, and heating? --   n/a nursing home resident       Housing Stability    In the last 12 months, was there a time when you were not able to pay the mortgage or rent on time? --   n/a nursing home resident    In the last 12 months, how many places have you lived? --   n/a nursing home resident    In the last 12 months, was there a time when you did not have a steady place to sleep or slept in a shelter (including now)? --   n/a nursing home resident       Transportation Needs     In the past 12 months, has lack of transportation kept you from medical appointments or from getting medications? --   n/a nursing home resident    In the past 12 months, has lack of transportation kept you from meetings, work, or from getting things needed for daily living? --   n/a nursing home resident       Food Insecurity    Within the past 12 months, you worried that your food would run out before you got the money to buy more. --   n/a nursing home resident    Within the past 12 months, the food you bought just didn't last and you didn't have money to get more. --   n/a nursing home resident       Stress    Do you feel stress - tense, restless, nervous, or anxious, or unable to sleep at night because your mind is troubled all the time - these days? --   n/a nursing home resident       Social Connections    In a typical week, how many times do you talk on the phone with family, friends, or neighbors? --   n/a nursing home resident    How often do you get together with friends or relatives? --   n/a nursing home resident    How often do you attend Zoroastrianism or Bahai services? --   n/a nursing home resident    Do you belong to any clubs or organizations such as Zoroastrianism groups, unions, fraternal or athletic groups, or school groups? --   n/a nursing home resident    How often do you attend meetings of the clubs or organizations you belong to? --   n/a nursing home resident    Are you , , , , never , or living with a partner? --   n/a nursing home resident       Alcohol Use    Q1: How often do you have a drink containing alcohol? --   n/a nursing home resident    Q2: How many drinks containing alcohol do you have on a typical day when you are drinking? --   n/a nursing home resident    Q3: How often do you have six or more drinks on one occasion? --   n/a nursing home resident       OTHER    Name(s) of People in Home Resident at [facility]                             PCP:  Primary Doctor No  None        Pharmacy:    [facility]  [address]  [facility] [address]  Phone: [phone] Fax: [phone]        Emergency Contacts:  Extended Emergency Contact Information  Primary Emergency Contact: Kassandra Leon  Mobile Phone: [phone]  Relation: Mother      Insurance:    Payor: MEDICARE / Plan: MEDICARE PART A & B / Product Type: Government /     Pamella Curry RN     [phone]      03/24/2023  11:55 AM

## 2023-03-24 NOTE — PLAN OF CARE
Problem: Occupational Therapy  Goal: Occupational Therapy Goal  Outcome: Met     Pt eval & D/C 3/24/23. Pt at PLOF, return to NH.

## 2023-03-24 NOTE — NURSING
Primary RN called to notify that pt got his requested grits and is now ready for HD. Arrived back at pt's bedside. Pt in NAD, VSS. AAOx4. Left upper arm AVF cannulated with 15g needles x2 using aseptic technique. Lines connected and secured- tx initiated at 1050.

## 2023-03-24 NOTE — ASSESSMENT & PLAN NOTE
Admitted for Hypertensive urgency and hyperglycemia. Chest x ray with bilateral edema. Echo on 3/20 EF 55%, CVP 15    Outpatient HD Information:     -Outpatient HD unit: [facility]   -HD tx days: MWF   -HD tx time: 4hrs   -Last HD tx prior to presentation: 3/20/23  -HD access: LUE AVF   -HD modality: iHD   -Residual urine: Anuric      3/22:  HD completed, net UF of 4L.     Plan/Recommendations:     -HD today  -UFG 3-4L as tolerated  -please hold lisinopril till after HD  -Renal diet, if not NPO   -Strict I/O's and daily weights  -Daily renal function panels   -Renally dose meds

## 2023-03-24 NOTE — ASSESSMENT & PLAN NOTE
Reported recurrent epistaxis which pt was seen for in ED on 3/21 and 3/22.  Patient received afrin and bleeding resolved.  Suspect secondary to hypertensive emergency and use of blood thinners.    -- Trend CBC  -- Blood pressure control cardene gtt titrated off and home PO meds resumed   -- Hold anticoagulation at this time, resume when appropriate    Resolved

## 2023-03-24 NOTE — PROGRESS NOTES
[facility] - Cardiac Medical ICU  Critical Care Medicine  Progress Note    Patient Name: Cosme Lewis  MRN: [id number]  Admission Date: 3/22/2023  Hospital Length of Stay: 2 days  Code Status: Full Code  Attending Provider: Marquis Fernández MD  Primary Care Provider: Primary Doctor No   Principal Problem: Hypertensive emergency    Subjective:     HPI:  Mr. Cosme Lewis is 57 y.o. man with a history of ESRD on HD, DM, HTN, and COPD who presented to [facility] ED on 3/22 with complaints of shortness of breath and recurrent epistaxis.  He was in the ED the day prior with epistaxis that resolved and was discharged home. Found to be hyperglycemic without AG elevation or elevated BHB.  Hypertensive with SBP up to 240's and started on cardene gtt.  Critical care medicine consulted due to hypertensive emergency and hyperglycemia and patient admitted to MICU.        Hospital/ICU Course:  Admitted to MICU on insulin and cardene gtt.  Transitioned off insulin overnight, some issues with hypoglycemia which having improved since initiating diet.  Weaning of cardene and resumed home medications.        No new subjective & objective note has been filed under this hospital service since the last note was generated.      ABG  Recent Labs   Lab 03/21/23 2043   PH 7.311*   PO2 45   PCO2 44.2   HCO3 22.3*   BE -4     Assessment/Plan:     ENT  Epistaxis  Reported recurrent epistaxis which pt was seen for in ED on 3/21 and 3/22.  Patient received afrin and bleeding resolved.  Suspect secondary to hypertensive emergency and use of blood thinners.    -- Trend CBC  -- Blood pressure control cardene gtt titrated off and home PO meds resumed   -- Hold anticoagulation at this time, resume when appropriate    Resolved    Pulmonary  Shortness of breath  Secondary to htn urgency, pulmonary edema, wean fio2,  HD today    Chronic hypoxemic respiratory failure  Patient with Hypoxic Respiratory failure which is Acute on chronic.  he is on home oxygen at 3 LPM.  Supplemental oxygen was provided and noted-  .   Signs/symptoms of respiratory failure include- tachypnea and increased work of breathing. Contributing diagnoses includes - COPD Labs and images were reviewed. Patient Has recent ABG, which has been reviewed. Will treat underlying causes and adjust management of respiratory failure as follows:  Chest xray with appearance of volume overload; rales on exam. Stable on 4L nasal cannula in the ED.    --Nephrology consulted for HD; volume removal  --BP control; Cardene drip  --Wean FiO2 for SpO2>88%, now on room air  --Home inhalers; PRN nebs    Cardiac/Vascular  * Hypertensive emergency  Hypertensive in ED with SBP up to 240's.  On nifedipine, coreg, hydralazine, clonidine patch, imdur at home per chart review.  Started on cardene gtt in ED.      -- Titrate cardene gtt for goal SBP <180  -- Continue home meds and home clonidine patch  -- Nephrology consulted for HD reports he does MWF, HD done on 3/23    Off cardene gtt      Primary hypertension  On procardia, carvedilol, and will start lisinopril- can increase to 40mg if pressure still elevated    HLD (hyperlipidemia)  Continue home statin.      Chronic diastolic heart failure  Echo 3/19 with EF 55% with grade 2 LV diastolic dysfunction. Pulmonary hypertension noted.      Renal/  ESRD (end stage renal disease)  ESRD on HD MWF.  Reports last HD on Monday he missed today given his admission.    -- Nephrology consulted for HD  -- Renally dose meds  -- Avoid nephrotoxins  -- Serial BMPs    Hematology  Multiple subsegmental pulmonary emboli without acute cor pulmonale  Hold home apixaban in the setting of epistaxis, resume when appropriate.      Oncology  Anemia in ESRD (end-stage renal disease)  Epistaxis on admission, Hgb stable and bleeding now controlled.      -- CBC daily  -- Type and screen  -- Transfuse for Hgb <7  -- Epoetin T, TH, Sat    Endocrine  Type 2 diabetes mellitus with hyperglycemia, with long-term current  use of insulin  Hyperglycemic on admission without elevated AG.  Now off gtt, some issues with hypoglycemia overnight requiring dextrose bolus, diet started BG improving with PO intake.        -- HbA1c on 3/6 9.5  -- Transitioned of insulin gtt, continue long acting and SSI  -- Hypoglycemia order set  -- DM diet       Critical Care Daily Checklist:    A: Awake: RASS Goal/Actual Goal:    Actual:     B: Spontaneous Breathing Trial Performed?     C: SAT & SBT Coordinated?  na                      D: Delirium: CAM-ICU Overall CAM-ICU: Negative   E: Early Mobility Performed? No   F: Feeding Goal: Goals: Meet % EEN, EPN by RD f/u date  Status: Nutrition Goal Status: new   Current Diet Order   Procedures    Diet diabetic [facility]; 2000 Calorie     Order Specific Question:   Indicate patient location for additional diet options:     Answer:   [facility]     Order Specific Question:   Total calories:     Answer:   2000 Calorie      AS: Analgesia/Sedation none   T: Thromboembolic Prophylaxis y   H: HOB > 300 Yes   U: Stress Ulcer Prophylaxis (if needed) na   G: Glucose Control y   B: Bowel Function     I: Indwelling Catheter (Lines & Dutta) Necessity none   D: De-escalation of Antimicrobials/Pharmacotherapies na    Plan for the day/ETD HD/stepdown    Code Status:  Family/Goals of Care: Full Code          Marquis Fernández MD  Critical Care Medicine  [facility] - Cardiac Medical ICU

## 2023-03-24 NOTE — PLAN OF CARE
Problem: Physical Therapy  Goal: Physical Therapy Goal  Outcome: Met     Eval completed. Pt functioning at baseline mobility.

## 2023-03-24 NOTE — PLAN OF CARE
Recommendations     1. Continue current Diabetic diet; add Renal diet restrictions if necessary.   2. RD to monitor & follow-up.     Goals: Meet % EEN, EPN by RD f/u date  Nutrition Goal Status: new  Communication of RD Recs: reviewed with RN

## 2023-03-24 NOTE — NURSING
"Arrived to pt's bedside for HD. Pt is refusing treatment because he wants grits. Pt yelled multiple times "I am not doing dialysis until I get my grits." Notified ERLIN Jauregui.  "

## 2023-03-24 NOTE — CONSULTS
"[facility] - Cardiac Medical ICU  Adult Nutrition  Consult Note    SUMMARY     Recommendations    1. Continue current Diabetic diet; add Renal diet restrictions if necessary.   2. RD to monitor & follow-up.    Goals: Meet % EEN, EPN by RD f/u date  Nutrition Goal Status: new  Communication of RD Recs: reviewed with RN    Assessment and Plan    Nutrition Problem:  Excessive CHO intake    Related to (etiology):   Food and nutrition related knowledge deficit     Signs and Symptoms (as evidenced by):   A1C 9.5    Interventions(treatment strategy):  Collaboration of nutrition care w/ other providers    Nutrition Diagnosis Status:   New    Reason for Assessment    Reason For Assessment: consult  Diagnosis: other (see comments) (Hypertensive emergency)  Relevant Medical History: ESRD on HD, DM, HTN  Interdisciplinary Rounds: attended    General Information Comments: Information obtained from previous RD assessment (3/7): Pt reported good appetite PTA & UBW of 160#. No indicators of malnutrition noted; pt appears nourished. Per RN documentation, pt tolerating diet w/ 100% PO intake. Diabetic diet education complete 3/7. Pt receiving HD.  Nutrition Discharge Planning: Adequate PO intake    Nutrition/Diet History    Spiritual, Cultural Beliefs, Jain Practices, Values that Affect Care: no  Factors Affecting Nutritional Intake: None identified at this time    Anthropometrics    Temp: 97.6 °F (36.4 °C)  Height Method: Stated  Height: 5' 9" (175.3 cm)  Height (inches): 69 in  Weight Method: Stated  Weight: 70.3 kg (154 lb 15.7 oz)  Weight (lb): 154.98 lb  Ideal Body Weight (IBW), Male: 160 lb  % Ideal Body Weight, Male (lb): 96.86 %  BMI (Calculated): 22.9  BMI Grade: 18.5-24.9 - normal    Lab/Procedures/Meds    Pertinent Labs Reviewed: reviewed  Pertinent Labs Comments: Creat 3.7, GFR 18, A1C 9.5  Pertinent Medications Reviewed: reviewed  Pertinent Medications Comments: Statin    Estimated/Assessed Needs    Weight " Used For Calorie Calculations: 70.3 kg (154 lb 15.7 oz)    Energy Calorie Requirements (kcal): 1885 kcal/d  Energy Need Method: [place]-St Jeor (1.25 PAL)    Protein Requirements: 85 g/d (1.2 g/kg)  Weight Used For Protein Calculations: 70.3 kg (154 lb 15.7 oz)    Estimated Fluid Requirement Method: other (see comments) (Per MD or 1 mL/kcal)  RDA Method (mL): 1885    Nutrition Prescription Ordered    Current Diet Order: 2000 kcal ADA    Evaluation of Received Nutrient/Fluid Intake    I/O: -3.8L since admit    Comments: LBM: 3/23    Tolerance: tolerating    Nutrition Risk    Level of Risk/Frequency of Follow-up:  (1x/week)     Monitor and Evaluation    Food and Nutrient Intake: energy intake, food and beverage intake  Food and Nutrient Adminstration: diet order  Physical Activity and Function: nutrition-related ADLs and IADLs  Anthropometric Measurements: weight, weight change  Biochemical Data, Medical Tests and Procedures: lipid profile, inflammatory profile, glucose/endocrine profile, gastrointestinal profile, electrolyte and renal panel  Nutrition-Focused Physical Findings: overall appearance     Nutrition Follow-Up    RD Follow-up?: Yes

## 2023-03-24 NOTE — SUBJECTIVE & OBJECTIVE
Interval History:   NAEON.  Step down orders in-place.  Due for HD today.    Review of patient's allergies indicates:  No Known Allergies  Current Facility-Administered Medications   Medication Frequency    0.9%  NaCl infusion PRN    albuterol-ipratropium 2.5 mg-0.5 mg/3 mL nebulizer solution 3 mL Q4H PRN    aspirin EC tablet 81 mg Daily    atorvastatin tablet 40 mg Daily    carvediloL tablet 6.25 mg BID    cloNIDine 0.3 mg/24 hr td ptwk 1 patch Q7 Days    dextrose 10% bolus 125 mL 125 mL PRN    dextrose 10% bolus 250 mL 250 mL PRN    FLUoxetine capsule 40 mg Daily    fluticasone furoate-vilanteroL 100-25 mcg/dose diskus inhaler 1 puff Daily    glucagon (human recombinant) injection 1 mg PRN    hydrALAZINE injection 10 mg Q8H PRN    insulin aspart U-100 pen 0-5 Units QID (AC + HS) PRN    insulin aspart U-100 pen 3 Units TIDWM    insulin detemir U-100 (Levemir) pen 6 Units BID    lisinopriL tablet 20 mg Daily    melatonin tablet 6 mg Nightly PRN    mupirocin 2 % ointment BID    NIFEdipine 24 hr tablet 90 mg Daily    sodium chloride 0.9% flush 10 mL PRN    tiotropium bromide 2.5 mcg/actuation inhaler 2 puff Daily       Objective:     Vital Signs (Most Recent):  Temp: 98.1 °F (36.7 °C) (03/24/23 0701)  Pulse: 73 (03/24/23 0800)  Resp: (!) 29 (03/24/23 0800)  BP: (!) 162/79 (03/24/23 0800)  SpO2: (!) 93 % (03/24/23 0800)   Vital Signs (24h Range):  Temp:  [97.4 °F (36.3 °C)-98.5 °F (36.9 °C)] 98.1 °F (36.7 °C)  Pulse:  [61-76] 73  Resp:  [12-54] 29  SpO2:  [89 %-99 %] 93 %  BP: (107-221)/() 162/79     Weight: 70.3 kg (155 lb) (03/22/23 2115)  Body mass index is 22.89 kg/m².  Body surface area is 1.85 meters squared.    I/O last 3 completed shifts:  In: 666 [P.O.:666]  Out: 4500 [Other:4500]    Physical Exam  Vitals and nursing note reviewed.   Constitutional:       General: He is not in acute distress.     Appearance: He is ill-appearing.   HENT:      Mouth/Throat:      Mouth: Mucous membranes are dry.   Eyes:       Extraocular Movements: Extraocular movements intact.      Conjunctiva/sclera: Conjunctivae normal.   Cardiovascular:      Rate and Rhythm: Normal rate and regular rhythm.      Pulses: Normal pulses.   Pulmonary:      Effort: Pulmonary effort is normal. No respiratory distress.      Breath sounds: No wheezing or rales.   Abdominal:      General: Bowel sounds are normal. There is no distension.      Palpations: Abdomen is soft.      Tenderness: There is no abdominal tenderness.   Musculoskeletal:      Cervical back: Neck supple.      Right lower leg: No edema.      Left lower leg: No edema.   Skin:     General: Skin is warm and dry.   Neurological:      General: No focal deficit present.      Mental Status: He is alert and oriented to person, place, and time. Mental status is at baseline.   Psychiatric:         Behavior: Behavior is cooperative.       Significant Labs:  CBC:   Recent Labs   Lab 03/24/23  0236   WBC 6.53   RBC 2.43*   HGB 7.2*   HCT 22.2*      MCV 91   MCH 29.6   MCHC 32.4     CMP:   Recent Labs   Lab 03/24/23 0236   *   CALCIUM 8.2*   ALBUMIN 2.7*   PROT 5.8*   *   K 5.0   CO2 21*   CL 96   BUN 42*   CREATININE 3.7*   ALKPHOS 186*   ALT 26   AST 28   BILITOT 0.5

## 2023-03-24 NOTE — ASSESSMENT & PLAN NOTE
On procardia, carvedilol, and will start lisinopril- can increase to 40mg if pressure still elevated

## 2023-03-25 NOTE — PROGRESS NOTES
[facility] - Cardiac Medical [facility]  Critical Care Medicine  Progress Note    Patient Name: Cosme Lewis  MRN: [id number]  Admission Date: 3/22/2023  Hospital Length of Stay: 3 days  Code Status: Full Code  Attending Provider: Marquis Fernández MD  Primary Care Provider: Primary Doctor No   Principal Problem: Hypertensive emergency    Subjective:     HPI:  Mr. Cosme Lewis is 57 y.o. man with a history of ESRD on HD, DM, HTN, and COPD who presented to [facility] ED on 3/22 with complaints of shortness of breath and recurrent epistaxis.  He was in the ED the day prior with epistaxis that resolved and was discharged home. Found to be hyperglycemic without AG elevation or elevated BHB.  Hypertensive with SBP up to 240's and started on cardene gtt.  Critical care medicine consulted due to hypertensive emergency and hyperglycemia and patient admitted to MICU.        Hospital/ICU Course:  Admitted to MICU on insulin and cardene gtt.  Transitioned off insulin overnight, some issues with hypoglycemia which having improved since initiating diet.  Weaning of cardene and resumed home medications.        Interval History/Significant Events: no acute events. Waiting for a bed    Review of Systems  Objective:     Vital Signs (Most Recent):  Temp: 97.7 °F (36.5 °C) (03/25/23 0700)  Pulse: 71 (03/25/23 1100)  Resp: 14 (03/25/23 1100)  BP: (!) 120/94 (03/25/23 1100)  SpO2: (!) 94 % (03/25/23 1100)   Vital Signs (24h Range):  Temp:  [97.7 °F (36.5 °C)-98.7 °F (37.1 °C)] 97.7 °F (36.5 °C)  Pulse:  [70-84] 71  Resp:  [13-59] 14  SpO2:  [89 %-99 %] 94 %  BP: (120-214)/(64-94) 120/94   Weight: 70.3 kg (154 lb 15.7 oz)  Body mass index is 22.89 kg/m².      Intake/Output Summary (Last 24 hours) at 3/25/2023 1213  Last data filed at 3/24/2023 1430  Gross per 24 hour   Intake 500 ml   Output 4500 ml   Net -4000 ml       Physical Exam    Vents:     Lines/Drains/Airways       Drain  Duration                  Hemodialysis AV Fistula Left upper arm -- days               Peripheral Intravenous Line  Duration                  Peripheral IV - Single Lumen 03/22/23 1032 22 G Posterior;Right Hand 3 days         Peripheral IV - Single Lumen 03/22/23 1033 22 G Distal;Left;Posterior Forearm 3 days                  Significant Labs:    CBC/Anemia Profile:  Recent Labs   Lab 03/24/23  0236 03/25/23  0405   WBC 6.53 4.66   HGB 7.2* 7.2*   HCT 22.2* 22.8*    192   MCV 91 93   RDW 15.4* 15.4*        Chemistries:  Recent Labs   Lab 03/24/23  0236 03/25/23  0405   * 133*   K 5.0 3.9   CL 96 96   CO2 21* 27   BUN 42* 29*   CREATININE 3.7* 2.6*   CALCIUM 8.2* 8.1*   ALBUMIN 2.7* 2.7*   PROT 5.8* 6.0   BILITOT 0.5 0.6   ALKPHOS 186* 195*   ALT 26 39   AST 28 54*   MG 2.2 2.0   PHOS 4.1 3.5           Significant Imaging:        ABG  Recent Labs   Lab 03/21/23  2043   PH 7.311*   PO2 45   PCO2 44.2   HCO3 22.3*   BE -4     Assessment/Plan:     ENT  Epistaxis  Reported recurrent epistaxis which pt was seen for in ED on 3/21 and 3/22.  Patient received afrin and bleeding resolved.  Suspect secondary to hypertensive emergency and use of blood thinners.    -- Trend CBC  -- Blood pressure control cardene gtt titrated off and home PO meds resumed   -- Hold anticoagulation at this time, resume when appropriate    Resolved    Pulmonary  Shortness of breath  Secondary to htn urgency, pulmonary edema, wean fio2,  HD scheduled    Chronic hypoxemic respiratory failure  Patient with Hypoxic Respiratory failure which is Acute on chronic.  he is on home oxygen at 3 LPM. Supplemental oxygen was provided and noted-  .   Signs/symptoms of respiratory failure include- tachypnea and increased work of breathing. Contributing diagnoses includes - COPD Labs and images were reviewed. Patient Has recent ABG, which has been reviewed. Will treat underlying causes and adjust management of respiratory failure as follows:  Chest xray with appearance of volume overload; rales on exam. Stable on 4L nasal cannula in  the ED.    --Nephrology consulted for HD; volume removal  --BP control; Cardene drip  --Wean FiO2 for SpO2>88%, now on room air  --Home inhalers; PRN nebs    Cardiac/Vascular  * Hypertensive emergency  Hypertensive in ED with SBP up to 240's.  On nifedipine, coreg, hydralazine, clonidine patch, imdur at home per chart review.  Started on cardene gtt in ED.      -- Titrate cardene gtt for goal SBP <180  -- Continue home meds and home clonidine patch  -- Nephrology consulted for HD reports he does MWF, HD done on 3/23    Off cardene gtt      Primary hypertension  On procardia, carvedilol, and will start lisinopril- can increase to 40mg if pressure still elevated    HLD (hyperlipidemia)  Continue home statin.      Chronic diastolic heart failure  Echo 3/19 with EF 55% with grade 2 LV diastolic dysfunction. Pulmonary hypertension noted.      Renal/  ESRD (end stage renal disease)  ESRD on HD MWF.  Reports last HD on Monday he missed today given his admission.    -- Nephrology consulted for HD  -- Renally dose meds  -- Avoid nephrotoxins  -- Serial BMPs    Hematology  Multiple subsegmental pulmonary emboli without acute cor pulmonale  Hold home apixaban in the setting of epistaxis, resume when appropriate.      Oncology  Anemia in ESRD (end-stage renal disease)  Epistaxis on admission, Hgb stable and bleeding now controlled.      -- CBC daily  -- Type and screen  -- Transfuse for Hgb <7  -- Epoetin T, TH, Sat    Endocrine  Type 2 diabetes mellitus with hyperglycemia, with long-term current use of insulin  Hyperglycemic on admission without elevated AG.  Now off gtt, some issues with hypoglycemia overnight requiring dextrose bolus, diet started BG improving with PO intake.        -- HbA1c on 3/6 9.5  -- Transitioned of insulin gtt, continue long acting and SSI  -- Hypoglycemia order set  -- DM diet       Critical Care Daily Checklist:    A: Awake: RASS Goal/Actual Goal:    Actual: Osorio Agitation Sedation Scale  (RASS): Alert and calm   B: Spontaneous Breathing Trial Performed?     C: SAT & SBT Coordinated?  na                      D: Delirium: CAM-ICU Overall CAM-ICU: Negative   E: Early Mobility Performed? No   F: Feeding Goal: Goals: Meet % EEN, EPN by RD f/u date  Status: Nutrition Goal Status: new   Current Diet Order   Procedures    Diet diabetic [facility]; 2000 Calorie     Double portions please     Order Specific Question:   Indicate patient location for additional diet options:     Answer:   [facility]     Order Specific Question:   Total calories:     Answer:   2000 Calorie      AS: Analgesia/Sedation    T: Thromboembolic Prophylaxis Start today   H: HOB > 300 Yes   U: Stress Ulcer Prophylaxis (if needed) na   G: Glucose Control y   B: Bowel Function     I: Indwelling Catheter (Lines & Dutta) Necessity na   D: De-escalation of Antimicrobials/Pharmacotherapies no    Plan for the day/ETD Pt/ot    Code Status:  Family/Goals of Care: Full Code       Critical Care Time: 35 minutes  Critical secondary to Patient has a condition that poses threat to life and bodily function: Severe Respiratory Distress      Critical care was time spent personally by me on the following activities: development of treatment plan with patient or surrogate and bedside caregivers, discussions with consultants, evaluation of patient's response to treatment, examination of patient, ordering and performing treatments and interventions, ordering and review of laboratory studies, ordering and review of radiographic studies, pulse oximetry, re-evaluation of patient's condition. This critical care time did not overlap with that of any other provider or involve time for any procedures.     Marquis Fernández MD  Critical Care Medicine  [facility] - Cardiac Medical ICU

## 2023-03-25 NOTE — PLAN OF CARE
Nephrology Chart Review    Intake/Output Summary (Last 24 hours) at 3/25/2023 0734  Last data filed at 3/24/2023 1430  Gross per 24 hour   Intake 500 ml   Output 4500 ml   Net -4000 ml       Vitals:    03/25/23 0400 03/25/23 0453 03/25/23 0500 03/25/23 0600   BP: (!) 174/86  (!) 176/84 (!) 160/80   BP Location: Right arm  Right arm Right arm   Patient Position: Lying  Lying Lying   Pulse: 70 73 76 76   Resp: (!) 23 20 18 19   Temp:       TempSrc:       SpO2:  (!) 91% (!) 92% (!) 89%   Weight:       Height:           Recent Labs   Lab 03/23/23  0410 03/24/23  0236 03/25/23  0405   * 130* 133*   K 3.6 5.0 3.9   CL 99 96 96   CO2 24 21* 27   BUN 25* 42* 29*   CREATININE 2.4* 3.7* 2.6*   CALCIUM 8.3* 8.2* 8.1*   PHOS 2.8 4.1 3.5     Chart reviewed with no acute events overnight. Underwent iHD yesterday with 4 liters UF.   No other related issues identified. Still hypertensive overnight however laboratory studies acceptable on saturating +89% on 1 liter via nasal cannula. Please call Nephrology. as needed. We will continue to follow.    Cole Rodriguez MD  Nephrology

## 2023-03-25 NOTE — SUBJECTIVE & OBJECTIVE
Interval History/Significant Events: no acute events. Waiting for a bet    Review of Systems  Objective:     Vital Signs (Most Recent):  Temp: 97.7 °F (36.5 °C) (03/25/23 0700)  Pulse: 71 (03/25/23 1100)  Resp: 14 (03/25/23 1100)  BP: (!) 120/94 (03/25/23 1100)  SpO2: (!) 94 % (03/25/23 1100)   Vital Signs (24h Range):  Temp:  [97.7 °F (36.5 °C)-98.7 °F (37.1 °C)] 97.7 °F (36.5 °C)  Pulse:  [70-84] 71  Resp:  [13-59] 14  SpO2:  [89 %-99 %] 94 %  BP: (120-214)/(64-94) 120/94   Weight: 70.3 kg (154 lb 15.7 oz)  Body mass index is 22.89 kg/m².      Intake/Output Summary (Last 24 hours) at 3/25/2023 1213  Last data filed at 3/24/2023 1430  Gross per 24 hour   Intake 500 ml   Output 4500 ml   Net -4000 ml       Physical Exam    Vents:     Lines/Drains/Airways       Drain  Duration                  Hemodialysis AV Fistula Left upper arm -- days              Peripheral Intravenous Line  Duration                  Peripheral IV - Single Lumen 03/22/23 1032 22 G Posterior;Right Hand 3 days         Peripheral IV - Single Lumen 03/22/23 1033 22 G Distal;Left;Posterior Forearm 3 days                  Significant Labs:    CBC/Anemia Profile:  Recent Labs   Lab 03/24/23  0236 03/25/23  0405   WBC 6.53 4.66   HGB 7.2* 7.2*   HCT 22.2* 22.8*    192   MCV 91 93   RDW 15.4* 15.4*        Chemistries:  Recent Labs   Lab 03/24/23  0236 03/25/23  0405   * 133*   K 5.0 3.9   CL 96 96   CO2 21* 27   BUN 42* 29*   CREATININE 3.7* 2.6*   CALCIUM 8.2* 8.1*   ALBUMIN 2.7* 2.7*   PROT 5.8* 6.0   BILITOT 0.5 0.6   ALKPHOS 186* 195*   ALT 26 39   AST 28 54*   MG 2.2 2.0   PHOS 4.1 3.5           Significant Imaging:

## 2023-03-25 NOTE — NURSING
End of Shift Note:    AAOx4.    On 1L NC.    NSR. HR 70s.  SBPs 130s-170s.  Hydralazine given x1 for SBP > 180.    Tolerating PO well.    No UO. Pt on HD & anuric.  No BM.    MAEW.    Bath given.    Report given to oncoming nurse.    BP: 160/80   Pulse: 76    Temp 98.7 °F (37.1 °C)    SpO2: 89%

## 2023-03-26 NOTE — PLAN OF CARE
Problem: Device-Related Complication Risk (Hemodialysis)  Goal: Safe, Effective Therapy Delivery  Outcome: Ongoing, Progressing     Problem: Hemodynamic Instability (Hemodialysis)  Goal: Effective Tissue Perfusion  Outcome: Ongoing, Progressing     Problem: Infection (Hemodialysis)  Goal: Absence of Infection Signs and Symptoms  Outcome: Ongoing, Progressing     Problem: Adult Inpatient Plan of Care  Goal: Plan of Care Review  Outcome: Ongoing, Progressing  Goal: Patient-Specific Goal (Individualized)  Outcome: Ongoing, Progressing  Goal: Absence of Hospital-Acquired Illness or Injury  Outcome: Ongoing, Progressing  Goal: Optimal Comfort and Wellbeing  Outcome: Ongoing, Progressing  Goal: Readiness for Transition of Care  Outcome: Ongoing, Progressing     Problem: Diabetes Comorbidity  Goal: Blood Glucose Level Within Targeted Range  Outcome: Ongoing, Progressing     Problem: Skin Injury Risk Increased  Goal: Skin Health and Integrity  Outcome: Ongoing, Progressing     Problem: Fall Injury Risk  Goal: Absence of Fall and Fall-Related Injury  Outcome: Ongoing, Progressing     Problem: Impaired Wound Healing  Goal: Optimal Wound Healing  Outcome: Ongoing, Progressing

## 2023-03-26 NOTE — NURSING
Pt arrived to unit from previous floor. VSS. Pt had uneventful night. Vitals remained stable and at baseline. No complaints of pain or discomfort. Will continue to monitor pt remaining of shift.

## 2023-03-26 NOTE — PROGRESS NOTES
[facility] - Card[facility] Medical [facility]  Nephrology  Progress Note    Patient Name: Cosme Lewis  MRN: [id number]  Admission Date: 3/22/2023  Hospital Length of Stay: 4 days  Attending Provider: Marquis Fernández MD   Primary Care Physician: Primary Doctor No  Principal Problem:Hypertensive emergency    Subjective:     HPI: Cosme Lewis is a 57 year old male presents with recurrent epistaxis and SOB x 2 days. PMHx of hypertension, type 1 diabetes, COPD, HFpEF on 3 L home O2 and ESRD on hemodialysis (M/W/F) who presented to the ED for complaints of epistaxis x 1 day.On presentation to the ED: CXR with bilateral edema. Last HD on 3/20/23. K. 5.1. SBP 240s and patient started on cardene gtt. Nephrology consulted for ESRD on HD    Interval History: Patient and seen and examined. No acute events overnight. Afebrile with pulse ranging from 70-60s bpm. Systolic blood pressure ranging from 160-120s mmHg. He is saturating +88% on 2-3 liters via nasal cannula. Only positive ~240 mL in the last 24 hours. Have ordered vascular ultrasound of LUE AVF given concern for enlarging fistula and pain.    Review of patient's allergies indicates:  No Known Allergies  Current Facility-Administered Medications   Medication Frequency    0.9%  NaCl infusion PRN    albuterol-ipratropium 2.5 mg-0.5 mg/3 mL nebulizer solution 3 mL Q4H PRN    apixaban tablet 5 mg BID    aspirin EC tablet 81 mg Daily    atorvastatin tablet 40 mg Daily    carvediloL tablet 6.25 mg BID    dextrose 10% bolus 125 mL 125 mL PRN    dextrose 10% bolus 250 mL 250 mL PRN    FLUoxetine capsule 40 mg Daily    fluticasone furoate-vilanteroL 100-25 mcg/dose diskus inhaler 1 puff Daily    glucagon (human recombinant) injection 1 mg PRN    hydrALAZINE injection 10 mg Q8H PRN    insulin aspart U-100 pen 0-5 Units QID (AC + HS) PRN    insulin aspart U-100 pen 6 Units TIDWM    insulin detemir U-100 (Levemir) pen 10 Units BID    lisinopriL tablet 40 mg QHS    melatonin tablet 6 mg  Nightly PRN    mupirocin 2 % ointment BID    NIFEdipine 24 hr tablet 90 mg Daily    sodium chloride 0.9% flush 10 mL PRN    tiotropium bromide 2.5 mcg/actuation inhaler 2 puff Daily       Objective:     Vital Signs (Most Recent):  Temp: 98.6 °F (37 °C) (03/26/23 1100)  Pulse: 75 (03/26/23 1300)  Resp: (!) 29 (03/26/23 1300)  BP: (!) 164/81 (03/26/23 1300)  SpO2: (!) 94 % (03/26/23 1300)   Vital Signs (24h Range):  Temp:  [97.6 °F (36.4 °C)-99.1 °F (37.3 °C)] 98.6 °F (37 °C)  Pulse:  [67-77] 75  Resp:  [13-38] 29  SpO2:  [88 %-98 %] 94 %  BP: (121-169)/(57-93) 164/81     Weight: 70.3 kg (154 lb 15.7 oz) (03/24/23 1203)  Body mass index is 22.89 kg/m².  Body surface area is 1.85 meters squared.    I/O last 3 completed shifts:  In: 240 [P.O.:240]  Out: -     Physical Exam  Vitals and nursing note reviewed.   Constitutional:       General: He is awake. He is not in acute distress.     Appearance: He is ill-appearing. He is not diaphoretic.      Interventions: Nasal cannula in place.   HENT:      Head: Normocephalic and atraumatic.      Right Ear: External ear normal.      Left Ear: External ear normal.      Nose: Nose normal.      Mouth/Throat:      Mouth: Mucous membranes are dry.      Pharynx: Oropharynx is clear. No oropharyngeal exudate or posterior oropharyngeal erythema.      Comments: Poor dentition.  Eyes:      General: No scleral icterus.        Right eye: No discharge.         Left eye: No discharge.      Extraocular Movements: Extraocular movements intact.      Comments: Left conjunctival hemorrhage noted.    Cardiovascular:      Rate and Rhythm: Normal rate.      Heart sounds: Murmur heard.   Systolic murmur is present with a grade of 1/6.     No friction rub. No gallop.      Arteriovenous access: Left arteriovenous access is present.     Comments: Left upper extremity AVF with palpable thrill and audible bruit.  Pulmonary:      Effort: Pulmonary effort is normal. No respiratory distress.      Breath  sounds: No wheezing, rhonchi or rales.   Abdominal:      General: Bowel sounds are normal. There is no distension.      Palpations: Abdomen is soft.      Tenderness: There is no abdominal tenderness.   Musculoskeletal:      Cervical back: Neck supple.      Right lower leg: No edema.      Left lower leg: No edema.   Skin:     General: Skin is warm and dry.      Coloration: Skin is not jaundiced.      Findings: Bruising present.   Neurological:      General: No focal deficit present.      Mental Status: He is alert. Mental status is at baseline.      Cranial Nerves: No cranial nerve deficit.      Comments: Grossly hard of hearing.   Psychiatric:         Mood and Affect: Mood normal.         Behavior: Behavior normal. Behavior is cooperative.     Left upper extremity brachiobasilic  AV fistual on 03/26/2023:      Significant Labs:  CBC:   Recent Labs   Lab 03/26/23  0404   WBC 4.32   RBC 2.50*   HGB 7.5*   HCT 23.5*      MCV 94   MCH 30.0   MCHC 31.9*       CMP:   Recent Labs   Lab 03/26/23  0404   *   CALCIUM 6.7*   ALBUMIN 2.4*   PROT 5.0*      K 4.0   CO2 23      BUN 40*   CREATININE 3.2*   ALKPHOS 161*   ALT 31   AST 32   BILITOT 0.5          Assessment/Plan:     Cardiac/Vascular  * Hypertensive emergency  -Improved, weaned off Cardene gtt  -continue Carvedilol 6.25 mg BID; hydralazine 100 mg q 8 hours; nifedipine 90 mg QD, Lisinopril 20 QD  -hold lisinopril until after HD session on dialysis days    Renal/  Chronic kidney disease-mineral and bone disorder  -Low phos diet   -No indication for phos binders at this time   -Trend phos levels with daily labs     ESRD (end stage renal disease)  Admitted for Hypertensive urgency and hyperglycemia. Chest x ray with bilateral edema. Echo on 3/20 EF 55%, CVP 15    Outpatient HD Information:     -Outpatient HD unit: [facility]   -HD tx days: MWF   -HD tx time: 4hrs   -Last HD tx prior to presentation: 3/20/23  -HD access: LUE AVF   -HD modality: iHD    -Residual urine: Anuric      3/22:  HD completed, net UF of 4L.     Plan/Recommendations:  -Have ordered vascular ultrasound to assess LUE AVF given concern it is enlarging  -Renal diet, if not NPO   -Strict I/O's and daily weights  -Daily renal function panels   -Renally dose meds    Oncology  Anemia in ESRD (end-stage renal disease)  - Hgb goal 10-11  - defer epo at this time in setting of hypertensive urgency       Thank you for your consult. I will follow-up with patient. Please contact us if you have any additional questions.    Cole Rodriguez MD  Nephrology  [facility] - Cardiac Medical ICU

## 2023-03-26 NOTE — NURSING
LUE AVF is more swollen than this morning. No active bleeding. Purple area noted on proximal portion. MICU PA on call notified & she assessed. Will consult nephro tomorrow.

## 2023-03-26 NOTE — ASSESSMENT & PLAN NOTE
Admitted for Hypertensive urgency and hyperglycemia. Chest x ray with bilateral edema. Echo on 3/20 EF 55%, CVP 15    Outpatient HD Information:     -Outpatient HD unit: [facility]   -HD tx days: MWF   -HD tx time: 4hrs   -Last HD tx prior to presentation: 3/20/23  -HD access: LUE AVF   -HD modality: iHD   -Residual urine: Anuric      3/22:  HD completed, net UF of 4L.     Plan/Recommendations:  -Have ordered vascular ultrasound to assess LUE AVF given concern it is enlarging  -Renal diet, if not NPO   -Strict I/O's and daily weights  -Daily renal function panels   -Renally dose meds

## 2023-03-26 NOTE — PROGRESS NOTES
[facility] - Cardiac Medical ICU  Critical Care Medicine  Progress Note    Patient Name: Cosme Lewis  MRN: [id number]  Admission Date: 3/22/2023  Hospital Length of Stay: 4 days  Code Status: Full Code  Attending Provider: Marquis Fernández MD  Primary Care Provider: Primary Doctor No   Principal Problem: Hypertensive emergency    Subjective:     HPI:  Mr. Cosme Lewis is 57 y.o. man with a history of ESRD on HD, DM, HTN, and COPD who [place] [facility] [place] with complaints of shortness of breath and recurrent epistaxis.  He was in the ED the day prior with epistaxis that resolved and was discharged home. Found to be hyperglycemic without AG elevation or elevated BHB.  Hypertensive with SBP up to 240's and started on cardene gtt.  Critical care medicine consulted due to hypertensive emergency and hyperglycemia and patient admitted to MICU.        Hospital/ICU Course:  Admitted to MICU on insulin and cardene gtt.  Transitioned off insulin overnight, some issues with hypoglycemia which having improved since initiating diet.  Weaning of cardene and resumed home medications.        Interval History/Significant Events: naeon    Review of Systems  Objective:     Vital Signs (Most Recent):  Temp: 98.2 °F (36.8 °C) (03/26/23 0700)  Pulse: 72 (03/26/23 1000)  Resp: 13 (03/26/23 1000)  BP: (!) 156/71 (03/26/23 1000)  SpO2: (!) 93 % (03/26/23 1000)   Vital Signs (24h Range):  Temp:  [97.6 °F (36.4 °C)-99.1 °F (37.3 °C)] 98.2 °F (36.8 °C)  Pulse:  [69-77] 72  Resp:  [13-20] 13  SpO2:  [85 %-96 %] 93 %  BP: (121-169)/(57-93) 156/71   Weight: 70.3 kg (154 lb 15.7 oz)  Body mass index is 22.89 kg/m².      Intake/Output Summary (Last 24 hours) at 3/26/2023 1151  Last data filed at 3/25/2023 2100  Gross per 24 hour   Intake 240 ml   Output --   Net 240 ml       Physical Exam    Vents:  Oxygen Concentration (%): 32 (03/26/23 0754)  Lines/Drains/Airways       Drain  Duration                  Hemodialysis AV Fistula Left upper arm -- days               Peripheral Intravenous Line  Duration                  Peripheral IV - Single Lumen 03/22/23 1032 22 G Posterior;Right Hand 4 days         Peripheral IV - Single Lumen 03/22/23 1033 22 G Distal;Left;Posterior Forearm 4 days                  Significant Labs:    CBC/Anemia Profile:  Recent Labs   Lab 03/25/23  0405 03/26/23  0404   WBC 4.66 4.32   HGB 7.2* 7.5*   HCT 22.8* 23.5*    177   MCV 93 94   RDW 15.4* 15.0*        Chemistries:  Recent Labs   Lab 03/25/23  0405 03/26/23  0404   * 136   K 3.9 4.0   CL 96 105   CO2 27 23   BUN 29* 40*   CREATININE 2.6* 3.2*   CALCIUM 8.1* 6.7*   ALBUMIN 2.7* 2.4*   PROT 6.0 5.0*   BILITOT 0.6 0.5   ALKPHOS 195* 161*   ALT 39 31   AST 54* 32   MG 2.0 1.8   PHOS 3.5 3.7           Significant Imaging:        ABG  Recent Labs   Lab 03/21/23  2043   PH 7.311*   PO2 45   PCO2 44.2   HCO3 22.3*   BE -4     Assessment/Plan:     ENT  Epistaxis  Reported recurrent epistaxis which pt was seen for in ED on 3/21 and 3/22.  Patient received afrin and bleeding resolved.  Suspect secondary to hypertensive emergency and use of blood thinners.    -- Trend CBC  -- Blood pressure control cardene gtt titrated off and home PO meds resumed   -- Hold anticoagulation at this time, resume when appropriate    Resolved    Pulmonary  Shortness of breath  Secondary to htn urgency, pulmonary edema, wean fio2,  HD scheduled    Chronic hypoxemic respiratory failure  Patient with Hypoxic Respiratory failure which is Acute on chronic.  he is on home oxygen at 3 LPM. Supplemental oxygen was provided and noted- Oxygen Concentration (%):  [32] 32.   Signs/symptoms of respiratory failure include- tachypnea and increased work of breathing. Contributing diagnoses includes - COPD Labs and images were reviewed. Patient Has recent ABG, which has been reviewed. Will treat underlying causes and adjust management of respiratory failure as follows:  Chest xray with appearance of volume overload;  rales on exam. Stable on 4L nasal cannula in the ED.    --Nephrology consulted for HD; volume removal  --BP control; Cardene drip  --Wean FiO2 for SpO2>88%, now on room air  --Home inhalers; PRN nebs    Cardiac/Vascular  * Hypertensive emergency  Hypertensive in ED with SBP up to 240's.  On nifedipine, coreg, hydralazine, clonidine patch, imdur at home per chart review.  Started on cardene gtt in ED.      -- Titrate cardene gtt for goal SBP <180  -- Continue home meds and home clonidine patch  -- Nephrology consulted for HD reports he does MWF, HD done on 3/23    Off cardene gtt      Primary hypertension  On procardia, carvedilol, and will start lisinopril- can increase to 40mg if pressure still elevated    HLD (hyperlipidemia)  Continue home statin.      Chronic diastolic heart failure  Echo 3/19 with EF 55% with grade 2 LV diastolic dysfunction. Pulmonary hypertension noted.      Renal/  ESRD (end stage renal disease)  ESRD on HD MWF.  Reports last HD on Monday he missed today given his admission.    -- Nephrology consulted for HD  -- Renally dose meds  -- Avoid nephrotoxins  -- Serial BMPs    Hematology  Multiple subsegmental pulmonary emboli without acute cor pulmonale  Restart apixiban    Oncology  Anemia in ESRD (end-stage renal disease)  Epistaxis on admission, Hgb stable and bleeding now controlled.      -- CBC daily  -- Type and screen  -- Transfuse for Hgb <7  -- Epoetin T, TH, Sat    Endocrine  Type 2 diabetes mellitus with hyperglycemia, with long-term current use of insulin  Hyperglycemic on admission without elevated AG.  Now off gtt, some issues with hypoglycemia overnight requiring dextrose bolus, diet started BG improving with PO intake.        -- HbA1c on 3/6 9.5  -- Transitioned of insulin gtt, continue long acting and SSI  -- Hypoglycemia order set  -- DM diet       Critical Care Daily Checklist:    A: Awake: RASS Goal/Actual Goal:    Actual: Osorio Agitation Sedation Scale (RASS): Alert and  calm   B: Spontaneous Breathing Trial Performed?     C: SAT & SBT Coordinated?  na                      D: Delirium: CAM-ICU Overall CAM-ICU: Negative   E: Early Mobility Performed? Yes   F: Feeding Goal: Goals: Meet % EEN, EPN by RD f/u date  Status: Nutrition Goal Status: new   Current Diet Order   Procedures    Diet diabetic [facility]; 2800 Calorie; Double Portions     Double portions please     Order Specific Question:   Indicate patient location for additional diet options:     Answer:   [facility]     Order Specific Question:   Total calories:     Answer:   2800 Calorie     Order Specific Question:   Additional Diet Options:     Answer:   Double Portions      AS: Analgesia/Sedation n   T: Thromboembolic Prophylaxis y   H: HOB > 300 Yes   U: Stress Ulcer Prophylaxis (if needed) n   G: Glucose Control y   B: Bowel Function     I: Indwelling Catheter (Lines & Dutta) Necessity na   D: De-escalation of Antimicrobials/Pharmacotherapies n    Plan for the day/ETD uoob    Code Status:  Family/Goals of Care: Full Code       Marquis Fernández MD  Critical Care Medicine  [facility] - Cardiac Medical ICU

## 2023-03-26 NOTE — ASSESSMENT & PLAN NOTE
Patient with Hypoxic Respiratory failure which is Acute on chronic.  he is on home oxygen at 3 LPM. Supplemental oxygen was provided and noted- Oxygen Concentration (%):  [32] 32.   Signs/symptoms of respiratory failure include- tachypnea and increased work of breathing. Contributing diagnoses includes - COPD Labs and images were reviewed. Patient Has recent ABG, which has been reviewed. Will treat underlying causes and adjust management of respiratory failure as follows:  Chest xray with appearance of volume overload; rales on exam. Stable on 4L nasal cannula in the ED.    --Nephrology consulted for HD; volume removal  --BP control; Cardene drip  --Wean FiO2 for SpO2>88%, now on room air  --Home inhalers; PRN nebs

## 2023-03-26 NOTE — SUBJECTIVE & OBJECTIVE
Interval History: Patient and seen and examined. No acute events overnight. Afebrile with pulse ranging from 70-60s bpm. Systolic blood pressure ranging from 160-120s mmHg. He is saturating +88% on 2-3 liters via nasal cannula. Only positive ~240 mL in the last 24 hours. Have ordered vascular ultrasound of LUE AVF given concern for enlarging fistula and pain.    Review of patient's allergies indicates:  No Known Allergies  Current Facility-Administered Medications   Medication Frequency    0.9%  NaCl infusion PRN    albuterol-ipratropium 2.5 mg-0.5 mg/3 mL nebulizer solution 3 mL Q4H PRN    apixaban tablet 5 mg BID    aspirin EC tablet 81 mg Daily    atorvastatin tablet 40 mg Daily    carvediloL tablet 6.25 mg BID    dextrose 10% bolus 125 mL 125 mL PRN    dextrose 10% bolus 250 mL 250 mL PRN    FLUoxetine capsule 40 mg Daily    fluticasone furoate-vilanteroL 100-25 mcg/dose diskus inhaler 1 puff Daily    glucagon (human recombinant) injection 1 mg PRN    hydrALAZINE injection 10 mg Q8H PRN    insulin aspart U-100 pen 0-5 Units QID (AC + HS) PRN    insulin aspart U-100 pen 6 Units TIDWM    insulin detemir U-100 (Levemir) pen 10 Units BID    lisinopriL tablet 40 mg QHS    melatonin tablet 6 mg Nightly PRN    mupirocin 2 % ointment BID    NIFEdipine 24 hr tablet 90 mg Daily    sodium chloride 0.9% flush 10 mL PRN    tiotropium bromide 2.5 mcg/actuation inhaler 2 puff Daily       Objective:     Vital Signs (Most Recent):  Temp: 98.6 °F (37 °C) (03/26/23 1100)  Pulse: 75 (03/26/23 1300)  Resp: (!) 29 (03/26/23 1300)  BP: (!) 164/81 (03/26/23 1300)  SpO2: (!) 94 % (03/26/23 1300)   Vital Signs (24h Range):  Temp:  [97.6 °F (36.4 °C)-99.1 °F (37.3 °C)] 98.6 °F (37 °C)  Pulse:  [67-77] 75  Resp:  [13-38] 29  SpO2:  [88 %-98 %] 94 %  BP: (121-169)/(57-93) 164/81     Weight: 70.3 kg (154 lb 15.7 oz) (03/24/23 1203)  Body mass index is 22.89 kg/m².  Body surface area is 1.85 meters squared.    I/O last 3 completed  shifts:  In: 240 [P.O.:240]  Out: -     Physical Exam  Vitals and nursing note reviewed.   Constitutional:       General: He is awake. He is not in acute distress.     Appearance: He is ill-appearing. He is not diaphoretic.      Interventions: Nasal cannula in place.   HENT:      Head: Normocephalic and atraumatic.      Right Ear: External ear normal.      Left Ear: External ear normal.      Nose: Nose normal.      Mouth/Throat:      Mouth: Mucous membranes are dry.      Pharynx: Oropharynx is clear. No oropharyngeal exudate or posterior oropharyngeal erythema.      Comments: Poor dentition.  Eyes:      General: No scleral icterus.        Right eye: No discharge.         Left eye: No discharge.      Extraocular Movements: Extraocular movements intact.      Comments: Left conjunctival hemorrhage noted.    Cardiovascular:      Rate and Rhythm: Normal rate.      Heart sounds: Murmur heard.   Systolic murmur is present with a grade of 1/6.     No friction rub. No gallop.      Arteriovenous access: Left arteriovenous access is present.     Comments: Left upper extremity AVF with palpable thrill and audible bruit.  Pulmonary:      Effort: Pulmonary effort is normal. No respiratory distress.      Breath sounds: No wheezing, rhonchi or rales.   Abdominal:      General: Bowel sounds are normal. There is no distension.      Palpations: Abdomen is soft.      Tenderness: There is no abdominal tenderness.   Musculoskeletal:      Cervical back: Neck supple.      Right lower leg: No edema.      Left lower leg: No edema.   Skin:     General: Skin is warm and dry.      Coloration: Skin is not jaundiced.      Findings: Bruising present.   Neurological:      General: No focal deficit present.      Mental Status: He is alert. Mental status is at baseline.      Cranial Nerves: No cranial nerve deficit.      Comments: Grossly hard of hearing.   Psychiatric:         Mood and Affect: Mood normal.         Behavior: Behavior normal. Behavior  is cooperative.     Left upper extremity brachiobasilic  AV fistual on 03/26/2023:      Significant Labs:  CBC:   Recent Labs   Lab 03/26/23  0404   WBC 4.32   RBC 2.50*   HGB 7.5*   HCT 23.5*      MCV 94   MCH 30.0   MCHC 31.9*       CMP:   Recent Labs   Lab 03/26/23  0404   *   CALCIUM 6.7*   ALBUMIN 2.4*   PROT 5.0*      K 4.0   CO2 23      BUN 40*   CREATININE 3.2*   ALKPHOS 161*   ALT 31   AST 32   BILITOT 0.5

## 2023-03-26 NOTE — SUBJECTIVE & OBJECTIVE
Interval History/Significant Events: naeon    Review of Systems  Objective:     Vital Signs (Most Recent):  Temp: 98.2 °F (36.8 °C) (03/26/23 0700)  Pulse: 72 (03/26/23 1000)  Resp: 13 (03/26/23 1000)  BP: (!) 156/71 (03/26/23 1000)  SpO2: (!) 93 % (03/26/23 1000)   Vital Signs (24h Range):  Temp:  [97.6 °F (36.4 °C)-99.1 °F (37.3 °C)] 98.2 °F (36.8 °C)  Pulse:  [69-77] 72  Resp:  [13-20] 13  SpO2:  [85 %-96 %] 93 %  BP: (121-169)/(57-93) 156/71   Weight: 70.3 kg (154 lb 15.7 oz)  Body mass index is 22.89 kg/m².      Intake/Output Summary (Last 24 hours) at 3/26/2023 1157  Last data filed at 3/25/2023 2100  Gross per 24 hour   Intake 240 ml   Output --   Net 240 ml       Physical Exam    Vents:  Oxygen Concentration (%): 32 (03/26/23 0754)  Lines/Drains/Airways       Drain  Duration                  Hemodialysis AV Fistula Left upper arm -- days              Peripheral Intravenous Line  Duration                  Peripheral IV - Single Lumen 03/22/23 1032 22 G Posterior;Right Hand 4 days         Peripheral IV - Single Lumen 03/22/23 1033 22 G Distal;Left;Posterior Forearm 4 days                  Significant Labs:    CBC/Anemia Profile:  Recent Labs   Lab 03/25/23  0405 03/26/23  0404   WBC 4.66 4.32   HGB 7.2* 7.5*   HCT 22.8* 23.5*    177   MCV 93 94   RDW 15.4* 15.0*        Chemistries:  Recent Labs   Lab 03/25/23  0405 03/26/23  0404   * 136   K 3.9 4.0   CL 96 105   CO2 27 23   BUN 29* 40*   CREATININE 2.6* 3.2*   CALCIUM 8.1* 6.7*   ALBUMIN 2.7* 2.4*   PROT 6.0 5.0*   BILITOT 0.6 0.5   ALKPHOS 195* 161*   ALT 39 31   AST 54* 32   MG 2.0 1.8   PHOS 3.5 3.7           Significant Imaging:

## 2023-03-26 NOTE — PLAN OF CARE
CMICU DAILY GOALS       A: Awake    RASS: Goal -    Actual -     Restraint necessity:    B: Breathe   SBT: Not intubated   C: Coordinate A & B, analgesics/sedatives   Pain: managed    SAT: Not intubated  D: Delirium   CAM-ICU: Overall CAM-ICU: Negative  E: Early(intubated/ Progressive (non-intubated) Mobility   MOVE Screen: Pass   Activity: Activity Management: Rolling - L1  FAS: Feeding/Nutrition   Diet order: Diet/Nutrition Received: consistent carb/diabetic diet,    T: Thrombus   DVT prophylaxis: VTE Required Core Measure: Pharmacological prophylaxis initiated/maintained  H: HOB Elevation   Head of Bed (HOB) Positioning: HOB at 30-45 degrees  U: Ulcer Prophylaxis   GI: yes  G: Glucose control   managed Glycemic Management: blood glucose monitored  S: Skin   Bathing/Skin Care: bath, complete  Device Skin Pressure Protection: absorbent pad utilized/changed, adhesive use limited  Pressure Reduction Devices: pressure-redistributing mattress utilized  Pressure Reduction Techniques: frequent weight shift encouraged  Skin Protection: adhesive use limited, incontinence pads utilized  B: Bowel Function   no issues   I: Indwelling Catheters   Dutta necessity:     CVC necessity: No  D: De-escalation Antibiotics   No    Family/Goals of care/Code Status   Code Status: Full Code    24H Vital Sign Range  Temp:  [97.6 °F (36.4 °C)-99.1 °F (37.3 °C)]   Pulse:  [69-84]   Resp:  [13-22]   BP: (120-188)/(57-94)   SpO2:  [85 %-97 %]      Shift Events   No acute events throughout shift    VS and assessment per flow sheet, patient progressing towards goals as tolerated, plan of care reviewed with with patient, all concerns addressed, will continue to monitor.    Franky Baker

## 2023-03-27 PROBLEM — E11.10 DKA (DIABETIC KETOACIDOSIS): Status: RESOLVED | Noted: 2022-01-01 | Resolved: 2023-01-01

## 2023-03-27 NOTE — NURSING
PT went to vascular to have fistula on LUE looked at today and then went to dialysis right after. Pt arrived back to unit VSS. Pt neuro assessment done due to pt telling MD that he didn't have dialysis done today, but when I asked him he answered all questions appropriately and told me he had dialysis today. Dialysis notified me that BP was elevated when in dialysis. When he arrived to floor his SBP was 172 after receiving his daily BP meds after receiving dialysis. Will assess BP again shortly     Repeated /84, PRN Po hydralazine given. Will reassess BP in 30 min

## 2023-03-27 NOTE — PLAN OF CARE
Problem: Device-Related Complication Risk (Hemodialysis)  Goal: Safe, Effective Therapy Delivery  Outcome: Ongoing, Progressing     Problem: Diabetes Comorbidity  Goal: Blood Glucose Level Within Targeted Range  Outcome: Ongoing, Progressing     Problem: Fall Injury Risk  Goal: Absence of Fall and Fall-Related Injury  Outcome: Ongoing, Progressing     Problem: Impaired Wound Healing  Goal: Optimal Wound Healing  Outcome: Ongoing, Progressing

## 2023-03-27 NOTE — ASSESSMENT & PLAN NOTE
Resolved in the MICU. Stepped down to hospital medicine with control via PO medications. Pt with LUE fistula that was thought to be failing. Pt in dialysis this morning with functional fistula. Pending US of the fistula. HD currently complete today without issue. Possible discharge if fistula remains stable.     -Carvedilol 6.25 PO BID  -Nifedipine 90 PO daily  -Lisonpril 40 PO nightly  -HD MWF

## 2023-03-27 NOTE — PROGRESS NOTES
[facility] HEMODIALYSIS NOTE     Patient currently on hemodialysis for removal of uremic toxins and volume.     Patient seen and evaluated on hemodialysis, tolerating treatment, see HD flowsheet for vitals and assessments.    Labs have been reviewed and the dialysate bath has been adjusted.       Assessment/Plan:    -ESRD on HD  -Patient seen on HD, tolerating treatment well, w/o complaints   -UF goal of 2.5L as tolerated   -Renal diet, if not NPO   -Strict I/O's and daily weights  -Daily renal function panels  -Keep MAP >65 while on HD   -Target Hg of 10-12  -Epo with HD   -Will continue to follow while inpatient       Abdulaziz Forbes NP  Nephrology  Pager:  [phone]

## 2023-03-27 NOTE — NURSING
Critical value called from lab. Blood glucose 44. 236 ml of OJ, multiple packs of eun crackers and pudding offered. 15 mins later cbg 109.Paged hosp med 4. Awaiting return call. Will continue to monitor.

## 2023-03-27 NOTE — PROGRESS NOTES
Patient arrived in a stretcher to dialysis unit.   Report received from Viki UREÑA  VS's per Doc Flowsheet.    ESRD on outpatient MWF schedule.  Maintenance Hemodialysis initiated using the following:    Dialysis Access: LUE AVF    Needle size: 15 gauge X2  Insertion with no complications.    Will Maintain telemetry and blood pressure monitoring throughout treatment.  Refer to flowsheet and MAR for details.

## 2023-03-27 NOTE — PROGRESS NOTES
[facility] - Intensive Care ([address] Medicine  Progress Note    Patient Name: Cosme Lewis  MRN: [id number]  Patient Class: IP- Inpatient   Admission Date: 3/22/2023  Length of Stay: 5 days  Attending Physician: Hal Prado MD  Primary Care Provider: Primary Doctor No        Subjective:     Principal Problem:Hypertensive emergency        HPI:  Mr. Cosme Lewis is 57 y.o. man with a history of ESRD on HD, DM, HTN, and COPD who presented to [facility] [place] on 3/22 with complaints of shortness of breath and recurrent epistaxis.  He was in the ED the day prior with epistaxis that resolved and was discharged home. Found to be hyperglycemic without AG elevation or elevated BHB.  Hypertensive with SBP up to 240's and started on cardene gtt.  Critical care medicine consulted due to hypertensive emergency and hyperglycemia and patient admitted to MICU.        Overview/Hospital Course:  No notes on file    Interval History: Pt in dialysis this morning. Fistula intact and working well with dialysis. Pt discharged from PT/OT and does not need further care from them.     Review of Systems   Constitutional:  Negative for activity change, appetite change, chills, fatigue, fever and unexpected weight change.   HENT:  Negative for trouble swallowing and voice change.    Eyes: Negative.    Respiratory:  Negative for cough, chest tightness, shortness of breath and wheezing.    Cardiovascular:  Negative for chest pain, palpitations and leg swelling.   Gastrointestinal:  Negative for abdominal distention, abdominal pain, anal bleeding, blood in stool, constipation, diarrhea, nausea, rectal pain and vomiting.   Endocrine: Negative.    Genitourinary: Negative.    Musculoskeletal:  Negative for back pain, neck pain and neck stiffness.   Skin:  Negative for pallor, rash and wound.   Allergic/Immunologic: Negative.    Neurological:  Negative for dizziness, seizures, syncope, weakness, light-headedness, numbness and headaches.    Hematological: Negative.    Psychiatric/Behavioral:  Negative for confusion and sleep disturbance. The patient is not nervous/anxious.    All other systems reviewed and are negative.  Objective:     Vital Signs (Most Recent):  Temp: 97.9 °F (36.6 °C) (03/27/23 0850)  Pulse: 78 (03/27/23 1230)  Resp: 15 (03/27/23 1230)  BP: (!) 182/87 (03/27/23 1230)  SpO2: 100 % (03/27/23 1215)   Vital Signs (24h Range):  Temp:  [97.6 °F (36.4 °C)-99 °F (37.2 °C)] 97.9 °F (36.6 °C)  Pulse:  [61-78] 78  Resp:  [14-20] 15  SpO2:  [92 %-100 %] 100 %  BP: (136-191)/() 182/87     Weight: 70.3 kg (154 lb 15.7 oz)  Body mass index is 22.89 kg/m².  No intake or output data in the 24 hours ending 03/27/23 1312   Physical Exam  Vitals and nursing note reviewed.   Constitutional:       General: He is awake. He is not in acute distress.     Appearance: He is ill-appearing. He is not diaphoretic.      Interventions: Nasal cannula in place.   HENT:      Head: Normocephalic and atraumatic.      Right Ear: External ear normal.      Left Ear: External ear normal.      Nose: Nose normal.      Mouth/Throat:      Mouth: Mucous membranes are dry.      Pharynx: Oropharynx is clear. No oropharyngeal exudate or posterior oropharyngeal erythema.      Comments: Poor dentition.  Eyes:      General: No scleral icterus.        Right eye: No discharge.         Left eye: No discharge.      Extraocular Movements: Extraocular movements intact.      Comments: Left conjunctival hemorrhage noted.    Cardiovascular:      Rate and Rhythm: Normal rate.      Heart sounds: Murmur heard.   Systolic murmur is present with a grade of 1/6.     No friction rub. No gallop.      Arteriovenous access: Left arteriovenous access is present.     Comments: Left upper extremity AVF with palpable thrill and audible bruit.  Pulmonary:      Effort: Pulmonary effort is normal. No respiratory distress.      Breath sounds: No wheezing, rhonchi or rales.   Abdominal:      General:  Bowel sounds are normal. There is no distension.      Palpations: Abdomen is soft.      Tenderness: There is no abdominal tenderness.   Musculoskeletal:      Cervical back: Neck supple.      Right lower leg: No edema.      Left lower leg: No edema.   Skin:     General: Skin is warm and dry.      Coloration: Skin is not jaundiced.      Findings: Bruising present.   Neurological:      General: No focal deficit present.      Mental Status: He is alert. Mental status is at baseline.      Cranial Nerves: No cranial nerve deficit.      Comments: Grossly hard of hearing.   Psychiatric:         Mood and Affect: Mood normal.         Behavior: Behavior normal. Behavior is cooperative.       Significant Labs: All pertinent labs within the past 24 hours have been reviewed.    Significant Imaging: I have reviewed all pertinent imaging results/findings within the past 24 hours.      Assessment/Plan:      * Hypertensive emergency  Resolved in the MICU. Stepped down to hospital medicine with control via PO medications. Pt with LUE fistula that was thought to be failing. Pt in dialysis this morning with functional fistula. Pending US of the fistula. HD currently complete today without issue. Possible discharge if fistula remains stable.     -Carvedilol 6.25 PO BID  -Nifedipine 90 PO daily  -Lisonpril 40 PO nightly  -HD MWF    Primary hypertension    See hypertensive emergency    Shortness of breath  See chronic resp failure      Epistaxis    RESOLVED    Type 2 diabetes mellitus with hyperglycemia, with long-term current use of insulin  Patient's FSGs are controlled on current medication regimen.  Last A1c reviewed-   Lab Results   Component Value Date    HGBA1C 9.5 (H) 03/06/2023     Most recent fingerstick glucose reviewed-   Recent Labs   Lab 03/26/23  2037 03/26/23  2220 03/27/23  0630 03/27/23  1206   POCTGLUCOSE 100 170* 109 108     Current correctional scale  Low  Decrease anti-hyperglycemic dose as follows-    Antihyperglycemics (From admission, onward)    Start     Stop Route Frequency Ordered    03/27/23 2100  insulin detemir U-100 (Levemir) pen 8 Units         -- SubQ 2 times daily 03/27/23 1013    03/27/23 1130  insulin aspart U-100 pen 4 Units         -- SubQ 3 times daily with meals 03/27/23 1013    03/24/23 0225  insulin aspart U-100 pen 0-5 Units         -- SubQ Before meals & nightly PRN 03/24/23 0126        Hold Oral hypoglycemics while patient is in the hospital.    HLD (hyperlipidemia)  Continue statin      Anemia in ESRD (end-stage renal disease)  Daily CBC; transfuse if Hgb <7    Chronic diastolic heart failure  Echo 3/19 with EF 55% with grade 2 LV diastolic dysfunction. Continue HD while inpatient.      Chronic hypoxemic respiratory failure  -HD for volume removal  -Continue home inhalers  -O2 if needed    ESRD (end stage renal disease)  Continue HD MWF  -Renally dose medications  -Strict Is/Os  -Follow Nephrology recs        VTE Risk Mitigation (From admission, onward)         Ordered     apixaban tablet 5 mg  2 times daily         03/25/23 1216     IP VTE HIGH RISK PATIENT  Once         03/22/23 1440     Place sequential compression device  Until discontinued         03/22/23 1440                Discharge Planning   GERA: 3/31/2023     Code Status: Full Code   Is the patient medically ready for discharge?:     Reason for patient still in hospital (select all that apply): Patient trending condition and Treatment  Discharge Plan A: Return to nursing home ([facility])          Yash De aL O MD  Department of Hospital Medicine   [facility] - Intensive Care ([place]-[facility])

## 2023-03-27 NOTE — PT/OT/SLP DISCHARGE
Physical Therapy Discharge Summary  Physical Therapy  Not Seen  Patient Name:  Cosme Lewis   MRN:  [id number]  Admitting Diagnosis:  Hypertensive emergency   Recent Surgery: * No surgery found *    Admit Date: 3/22/2023  Length of Stay: 5 days    Patient was evaluated and discharged by PT and OT services on 3/24. He is at his functional baseline - dependent with all ADLs and mobility - and skilled acute physical therapy services are not indicated at this time.    Tiffanie Hampton, PT, DPT  3/27/2023       0 = independent

## 2023-03-27 NOTE — SUBJECTIVE & OBJECTIVE
Interval History: Pt in dialysis this morning. Fistula intact and working well with dialysis. Pt discharged from PT/OT and does not need further care from them.     Review of Systems   Constitutional:  Negative for activity change, appetite change, chills, fatigue, fever and unexpected weight change.   HENT:  Negative for trouble swallowing and voice change.    Eyes: Negative.    Respiratory:  Negative for cough, chest tightness, shortness of breath and wheezing.    Cardiovascular:  Negative for chest pain, palpitations and leg swelling.   Gastrointestinal:  Negative for abdominal distention, abdominal pain, anal bleeding, blood in stool, constipation, diarrhea, nausea, rectal pain and vomiting.   Endocrine: Negative.    Genitourinary: Negative.    Musculoskeletal:  Negative for back pain, neck pain and neck stiffness.   Skin:  Negative for pallor, rash and wound.   Allergic/Immunologic: Negative.    Neurological:  Negative for dizziness, seizures, syncope, weakness, light-headedness, numbness and headaches.   Hematological: Negative.    Psychiatric/Behavioral:  Negative for confusion and sleep disturbance. The patient is not nervous/anxious.    All other systems reviewed and are negative.  Objective:     Vital Signs (Most Recent):  Temp: 97.9 °F (36.6 °C) (03/27/23 0850)  Pulse: 78 (03/27/23 1230)  Resp: 15 (03/27/23 1230)  BP: (!) 182/87 (03/27/23 1230)  SpO2: 100 % (03/27/23 1215)   Vital Signs (24h Range):  Temp:  [97.6 °F (36.4 °C)-99 °F (37.2 °C)] 97.9 °F (36.6 °C)  Pulse:  [61-78] 78  Resp:  [14-20] 15  SpO2:  [92 %-100 %] 100 %  BP: (136-191)/() 182/87     Weight: 70.3 kg (154 lb 15.7 oz)  Body mass index is 22.89 kg/m².  No intake or output data in the 24 hours ending 03/27/23 1312   Physical Exam  Vitals and nursing note reviewed.   Constitutional:       General: He is awake. He is not in acute distress.     Appearance: He is ill-appearing. He is not diaphoretic.      Interventions: Nasal cannula in  place.   HENT:      Head: Normocephalic and atraumatic.      Right Ear: External ear normal.      Left Ear: External ear normal.      Nose: Nose normal.      Mouth/Throat:      Mouth: Mucous membranes are dry.      Pharynx: Oropharynx is clear. No oropharyngeal exudate or posterior oropharyngeal erythema.      Comments: Poor dentition.  Eyes:      General: No scleral icterus.        Right eye: No discharge.         Left eye: No discharge.      Extraocular Movements: Extraocular movements intact.      Comments: Left conjunctival hemorrhage noted.    Cardiovascular:      Rate and Rhythm: Normal rate.      Heart sounds: Murmur heard.   Systolic murmur is present with a grade of 1/6.     No friction rub. No gallop.      Arteriovenous access: Left arteriovenous access is present.     Comments: Left upper extremity AVF with palpable thrill and audible bruit.  Pulmonary:      Effort: Pulmonary effort is normal. No respiratory distress.      Breath sounds: No wheezing, rhonchi or rales.   Abdominal:      General: Bowel sounds are normal. There is no distension.      Palpations: Abdomen is soft.      Tenderness: There is no abdominal tenderness.   Musculoskeletal:      Cervical back: Neck supple.      Right lower leg: No edema.      Left lower leg: No edema.   Skin:     General: Skin is warm and dry.      Coloration: Skin is not jaundiced.      Findings: Bruising present.   Neurological:      General: No focal deficit present.      Mental Status: He is alert. Mental status is at baseline.      Cranial Nerves: No cranial nerve deficit.      Comments: Grossly hard of hearing.   Psychiatric:         Mood and Affect: Mood normal.         Behavior: Behavior normal. Behavior is cooperative.       Significant Labs: All pertinent labs within the past 24 hours have been reviewed.    Significant Imaging: I have reviewed all pertinent imaging results/findings within the past 24 hours.

## 2023-03-27 NOTE — HPI
Mr. Cosme Lewis is 57 y.o. man with a history of ESRD on HD, DM, HTN, and COPD who presented to [facility] ED on 3/22 with complaints of shortness of breath and recurrent epistaxis.  He was in the ED the day prior with epistaxis that resolved and was discharged home. Found to be hyperglycemic without AG elevation or elevated BHB.  Hypertensive with SBP up to 240's and started on cardene gtt. Pt stepped down to hospital medicine where he required HD. Pt's fistula had concerns for failure but access was achieved to continue HD therapy.

## 2023-03-27 NOTE — ASSESSMENT & PLAN NOTE
Patient's FSGs are controlled on current medication regimen.  Last A1c reviewed-   Lab Results   Component Value Date    HGBA1C 9.5 (H) 03/06/2023     Most recent fingerstick glucose reviewed-   Recent Labs   Lab 03/26/23 2037 03/26/23  2220 03/27/23  0630 03/27/23  1206   POCTGLUCOSE 100 170* 109 108     Current correctional scale  Low  Decrease anti-hyperglycemic dose as follows-   Antihyperglycemics (From admission, onward)    Start     Stop Route Frequency Ordered    03/27/23 2100  insulin detemir U-100 (Levemir) pen 8 Units         -- SubQ 2 times daily 03/27/23 1013    03/27/23 1130  insulin aspart U-100 pen 4 Units         -- SubQ 3 times daily with meals 03/27/23 1013    03/24/23 0225  insulin aspart U-100 pen 0-5 Units         -- SubQ Before meals & nightly PRN 03/24/23 0126        Hold Oral hypoglycemics while patient is in the hospital.

## 2023-03-27 NOTE — PROGRESS NOTES
Hemodialysis treatment completed.    Treatment time received: 3.5 hours    Net fluid removed: 2.5 liters    Medications received: scheduled nifedipine and carvedilol    Tolerated Treatment well. VSS. No acute distress.    Blood returned and needles X2 removed. Pressure held till hemostasis obtained.  Placed gauze and tape dressing to site.  Fistual with continued bruit and thrill post treatment.    Report given to Viki UREÑA  Refer to flowsheet and MAR for details.  Patient transported back in stretcher from Dialysis to primary unit.

## 2023-03-27 NOTE — PT/OT/SLP PROGRESS
Occupational Therapy      Patient Name:  Cosme Lewis   MRN:  [id number]    Patient not seen today secondary to pt. Evaluated on 03-24-23 and found to be at PLOF . Pt. Resides in NH and does not require OT services at this time. .    3/27/2023

## 2023-03-27 NOTE — PLAN OF CARE
Problem: Device-Related Complication Risk (Hemodialysis)  Goal: Safe, Effective Therapy Delivery  Outcome: Ongoing, Progressing     Problem: Hemodynamic Instability (Hemodialysis)  Goal: Effective Tissue Perfusion  Outcome: Ongoing, Progressing     Problem: Infection (Hemodialysis)  Goal: Absence of Infection Signs and Symptoms  Outcome: Ongoing, Progressing     Problem: Adult Inpatient Plan of Care  Goal: Plan of Care Review  Outcome: Ongoing, Progressing  Goal: Patient-Specific Goal (Individualized)  Outcome: Ongoing, Progressing  Goal: Absence of Hospital-Acquired Illness or Injury  Outcome: Ongoing, Progressing  Goal: Optimal Comfort and Wellbeing  Outcome: Ongoing, Progressing  Goal: Readiness for Transition of Care  Outcome: Ongoing, Progressing     Problem: Diabetes Comorbidity  Goal: Blood Glucose Level Within Targeted Range  Outcome: Ongoing, Progressing     Problem: Skin Injury Risk Increased  Goal: Skin Health and Integrity  Outcome: Ongoing, Progressing     Problem: Fall Injury Risk  Goal: Absence of Fall and Fall-Related Injury  Outcome: Ongoing, Progressing     Problem: Impaired Wound Healing  Goal: Optimal Wound Healing  Outcome: Ongoing, Progressing     Problem: Electrolyte Imbalance (Chronic Kidney Disease)  Goal: Electrolyte Balance  Outcome: Ongoing, Progressing     Problem: Fluid Volume Excess (Chronic Kidney Disease)  Goal: Fluid Balance  Outcome: Ongoing, Progressing

## 2023-03-28 NOTE — PROGRESS NOTES
[facility] - Intensive Care ([address] Medicine  Progress Note    Patient Name: Cosme Lewis  MRN: [id number]  Patient Class: IP- Inpatient   Admission Date: 3/22/2023  Length of Stay: 6 days  Attending Physician: Hal Prado MD  Primary Care Provider: Primary Doctor No        Subjective:     Principal Problem:Hypertensive emergency        HPI:  Mr. Cosme Lewis is 57 y.o. man with a history of ESRD on HD, DM, HTN, and COPD who presented to [facility] ED on 3/22 with complaints of shortness of breath and recurrent epistaxis.  He was in the ED the day prior with epistaxis that resolved and was discharged home. Found to be hyperglycemic without AG elevation or elevated BHB.  Hypertensive with SBP up to 240's and started on cardene gtt. Pt stepped down to hospital medicine where he required HD. Pt's fistula had concerns for failure but access was achieved to continue HD therapy.       Overview/Hospital Course:  Pt stepped down from the MICU with better controlled blood pressure. Pt describing pain and an enlarging fistula. 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 Vascular surgery consulted for evaluation of long-term viability of fistula. Pt using fistula currently for HD without issue. Pt began to complain of eye issues stating that someone told him he had issues with his retina. Pt had diffuse hemorrhage throughout his sclera on exam.       Interval History: Pt describing non-specific eye issues this morning. Pt had diffuse scleral hemorrhage on exam with mild pain. Pt not describing any issues with HD yesterday. Pt still describing shortness of breath with some audible wheeze/transmitted upper airway sounds this morning. No other complaints at this time.     Review of Systems   Constitutional:  Negative for activity change, appetite change, chills, fatigue, fever and unexpected weight change.   HENT:  Negative for trouble swallowing and voice change.    Eyes:  Positive for pain and redness. Negative  for visual disturbance.   Respiratory:  Positive for cough and shortness of breath. Negative for chest tightness and wheezing.    Cardiovascular:  Negative for chest pain, palpitations and leg swelling.   Gastrointestinal:  Negative for abdominal distention, abdominal pain, anal bleeding, blood in stool, constipation, diarrhea, nausea, rectal pain and vomiting.   Endocrine: Negative.    Genitourinary: Negative.    Musculoskeletal:  Negative for back pain, neck pain and neck stiffness.   Skin:  Negative for pallor, rash and wound.   Allergic/Immunologic: Negative.    Neurological:  Negative for dizziness, seizures, syncope, weakness, light-headedness, numbness and headaches.   Hematological: Negative.    Psychiatric/Behavioral:  Negative for confusion and sleep disturbance. The patient is not nervous/anxious.    All other systems reviewed and are negative.  Objective:     Vital Signs (Most Recent):  Temp: 97.6 °F (36.4 °C) (03/28/23 1225)  Pulse: 64 (03/28/23 1225)  Resp: 18 (03/28/23 1225)  BP: (!) 143/64 (03/28/23 1225)  SpO2: (!) 90 % (03/28/23 1225)   Vital Signs (24h Range):  Temp:  [97.6 °F (36.4 °C)-98.7 °F (37.1 °C)] 97.6 °F (36.4 °C)  Pulse:  [64-78] 64  Resp:  [16-19] 18  SpO2:  [90 %-100 %] 90 %  BP: (141-178)/(64-87) 143/64     Weight: 70.3 kg (154 lb 15.7 oz)  Body mass index is 22.89 kg/m².    Intake/Output Summary (Last 24 hours) at 3/28/2023 1258  Last data filed at 3/27/2023 1414  Gross per 24 hour   Intake 350 ml   Output --   Net 350 ml      Physical Exam  Vitals and nursing note reviewed.   Constitutional:       General: He is awake. He is not in acute distress.     Appearance: He is ill-appearing. He is not diaphoretic.      Interventions: Nasal cannula in place.   HENT:      Head: Normocephalic and atraumatic.      Right Ear: External ear normal.      Left Ear: External ear normal.      Nose: Nose normal.      Mouth/Throat:      Mouth: Mucous membranes are dry.      Pharynx: Oropharynx is clear.  No oropharyngeal exudate or posterior oropharyngeal erythema.      Comments: Poor dentition.  Eyes:      General: No scleral icterus.        Right eye: No discharge.         Left eye: No discharge.      Extraocular Movements: Extraocular movements intact.      Comments: Left conjunctival hemorrhage noted.    Cardiovascular:      Rate and Rhythm: Normal rate.      Heart sounds: Murmur heard.   Systolic murmur is present with a grade of 1/6.     No friction rub. No gallop.      Arteriovenous access: Left arteriovenous access is present.     Comments: Left upper extremity AVF with palpable thrill and audible bruit.  Pulmonary:      Effort: Pulmonary effort is normal. No respiratory distress.      Breath sounds: No wheezing, rhonchi or rales.   Abdominal:      General: Bowel sounds are normal. There is no distension.      Palpations: Abdomen is soft.      Tenderness: There is no abdominal tenderness.   Musculoskeletal:      Cervical back: Neck supple.      Right lower leg: No edema.      Left lower leg: No edema.   Skin:     General: Skin is warm and dry.      Coloration: Skin is not jaundiced.      Findings: Bruising present.   Neurological:      General: No focal deficit present.      Mental Status: He is alert. Mental status is at baseline.      Cranial Nerves: No cranial nerve deficit.      Comments: Grossly hard of hearing.   Psychiatric:         Mood and Affect: Mood normal.         Behavior: Behavior normal. Behavior is cooperative.       Significant Labs: All pertinent labs within the past 24 hours have been reviewed.    Significant Imaging: I have reviewed all pertinent imaging results/findings within the past 24 hours.      Assessment/Plan:      * Hypertensive emergency  Resolved in the MICU. Stepped down to hospital medicine with control via PO medications. Pt with LUE fistula that was thought to be failing. Pt in dialysis this morning with functional fistula. Pending US of the fistula. HD currently complete  today without issue. Possible discharge if fistula remains stable.     -Carvedilol 6.25 PO BID  -Nifedipine 90 PO daily  -Lisonpril 40 PO nightly  -HD MWF    Primary hypertension    See hypertensive emergency    Shortness of breath  See chronic resp failure      Epistaxis    RESOLVED    Scleral hemorrhage of left eye  Pt describing mild discomfort in L eye without any severe pain. Will evaluate the need for an ophthalmology consult.      Type 2 diabetes mellitus with hyperglycemia, with long-term current use of insulin  Patient's FSGs are controlled on current medication regimen.  Last A1c reviewed-   Lab Results   Component Value Date    HGBA1C 9.5 (H) 03/06/2023     Most recent fingerstick glucose reviewed-   Recent Labs   Lab 03/27/23  2336 03/28/23  0059 03/28/23  0852 03/28/23  1227   POCTGLUCOSE 258* 253* 357* 391*     Current correctional scale  Low  Increase anti-hyperglycemic dose as follows-   Antihyperglycemics (From admission, onward)    Start     Stop Route Frequency Ordered    03/28/23 0715  insulin aspart U-100 pen 6 Units         -- SubQ 3 times daily with meals 03/28/23 0649    03/27/23 2100  insulin detemir U-100 (Levemir) pen 8 Units         -- SubQ 2 times daily 03/27/23 1013    03/24/23 0225  insulin aspart U-100 pen 0-5 Units         -- SubQ Before meals & nightly PRN 03/24/23 0126        Hold Oral hypoglycemics while patient is in the hospital.    HLD (hyperlipidemia)  Continue statin      Anemia in ESRD (end-stage renal disease)  Daily CBC; transfuse if Hgb <7    Chronic diastolic heart failure  Echo 3/19 with EF 55% with grade 2 LV diastolic dysfunction. Continue HD while inpatient.      Chronic hypoxemic respiratory failure  -HD for volume removal  -Continue home inhalers  -O2 currently 4L NC (3L at home)    ESRD (end stage renal disease)  Continue HD MWF  -Renally dose medications  -Strict Is/Os  -Follow Nephrology recs        VTE Risk Mitigation (From admission, onward)         Ordered      apixaban tablet 5 mg  2 times daily         03/25/23 1216     IP VTE HIGH RISK PATIENT  Once         03/22/23 1440     Place sequential compression device  Until discontinued         03/22/23 1440                Discharge Planning   GERA: 3/29/2023     Code Status: Full Code   Is the patient medically ready for discharge?:     Reason for patient still in hospital (select all that apply): Patient trending condition and Treatment  Discharge Plan A: Return to nursing home   Discharge Delays: None known at this time    Yash De La O MD  Department of Hospital Medicine   [facility] - Intensive Care ([facility]

## 2023-03-28 NOTE — HOSPITAL COURSE
Pt stepped down from the MICU with better controlled blood pressure. Pt describing pain and an enlarging fistula. 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 Vascular surgery consulted for evaluation of long-term viability of fistula. Pt using fistula currently for HD without issue. Pt began to complain of eye issues stating that someone told him he had issues with his retina. Pt had diffuse hemorrhage throughout his sclera on exam. Pt tolerating HD sessions well. Nothing to do for the conjunctival hemorrhage. Pt well enough to discharge back to his NH.

## 2023-03-28 NOTE — SUBJECTIVE & OBJECTIVE
Interval History: Seen on HD today, tolerating well.     Review of patient's allergies indicates:  No Known Allergies  Current Facility-Administered Medications   Medication Frequency    0.9%  NaCl infusion PRN    [START ON 3/29/2023] 0.9%  NaCl infusion Once    albuterol-ipratropium 2.5 mg-0.5 mg/3 mL nebulizer solution 3 mL Q4H PRN    apixaban tablet 5 mg BID    aspirin EC tablet 81 mg Daily    atorvastatin tablet 40 mg Daily    carvediloL tablet 6.25 mg BID    dextrose 10% bolus 125 mL 125 mL PRN    dextrose 10% bolus 250 mL 250 mL PRN    [START ON 3/29/2023] epoetin xavier injection 4,000 Units Every Mon, Wed, Fri    FLUoxetine capsule 40 mg Daily    fluticasone furoate-vilanteroL 100-25 mcg/dose diskus inhaler 1 puff Daily    glucagon (human recombinant) injection 1 mg PRN    hydrALAZINE injection 10 mg Q8H PRN    hydrALAZINE tablet 25 mg Q8H PRN    insulin aspart U-100 pen 0-5 Units QID (AC + HS) PRN    insulin aspart U-100 pen 6 Units TIDWM    insulin detemir U-100 (Levemir) pen 8 Units BID    isosorbide mononitrate 24 hr tablet 30 mg Daily    lisinopriL tablet 40 mg QHS    melatonin tablet 6 mg Nightly PRN    NIFEdipine 24 hr tablet 90 mg Daily    sodium chloride 0.9% flush 10 mL PRN    tiotropium bromide 2.5 mcg/actuation inhaler 2 puff Daily       Objective:     Vital Signs (Most Recent):  Temp: 97.6 °F (36.4 °C) (03/28/23 1225)  Pulse: 64 (03/28/23 1225)  Resp: 18 (03/28/23 1225)  BP: (!) 143/64 (03/28/23 1225)  SpO2: (!) 90 % (03/28/23 1225)   Vital Signs (24h Range):  Temp:  [97.6 °F (36.4 °C)-98.7 °F (37.1 °C)] 97.6 °F (36.4 °C)  Pulse:  [64-78] 64  Resp:  [16-19] 18  SpO2:  [90 %-100 %] 90 %  BP: (141-183)/(64-87) 143/64     Weight: 70.3 kg (154 lb 15.7 oz) (03/24/23 1203)  Body mass index is 22.89 kg/m².  Body surface area is 1.85 meters squared.    I/O last 3 completed shifts:  In: 890 [P.O.:240; I.V.:350; Other:300]  Out: 3150 [Other:3150]    Physical Exam  Vitals and nursing note reviewed.    Constitutional:       General: He is awake. He is not in acute distress.     Appearance: He is ill-appearing. He is not diaphoretic.      Interventions: Nasal cannula in place.   HENT:      Head: Normocephalic and atraumatic.      Right Ear: External ear normal.      Left Ear: External ear normal.      Nose: Nose normal.      Mouth/Throat:      Mouth: Mucous membranes are dry.      Pharynx: Oropharynx is clear. No oropharyngeal exudate or posterior oropharyngeal erythema.      Comments: Poor dentition.  Eyes:      General: No scleral icterus.        Right eye: No discharge.         Left eye: No discharge.      Extraocular Movements: Extraocular movements intact.      Conjunctiva/sclera: Conjunctivae normal.      Comments: Left conjunctival hemorrhage noted.    Cardiovascular:      Rate and Rhythm: Normal rate.      Heart sounds: Murmur heard.   Systolic murmur is present with a grade of 1/6.     No friction rub. No gallop.      Arteriovenous access: Left arteriovenous access is present.     Comments: Left upper extremity AVF with palpable thrill and audible bruit.  Pulmonary:      Effort: Pulmonary effort is normal. No respiratory distress.      Breath sounds: No wheezing, rhonchi or rales.      Comments: 3L NC  Abdominal:      General: Bowel sounds are normal. There is no distension.      Palpations: Abdomen is soft.      Tenderness: There is no abdominal tenderness.   Musculoskeletal:      Cervical back: Neck supple.      Right lower leg: No edema.      Left lower leg: No edema.      Comments: Left AVF, palpable thrill     Skin:     General: Skin is warm and dry.      Coloration: Skin is not jaundiced.      Findings: Bruising present.   Neurological:      General: No focal deficit present.      Mental Status: He is alert. Mental status is at baseline.      Cranial Nerves: No cranial nerve deficit.      Comments: Grossly hard of hearing.   Psychiatric:         Mood and Affect: Mood normal.         Behavior:  Behavior normal. Behavior is cooperative.       Significant Labs:  CBC:   Recent Labs   Lab 03/28/23 0224   WBC 4.48   RBC 2.38*   HGB 7.0*   HCT 22.7*      MCV 95   MCH 29.4   MCHC 30.8*     CMP:   Recent Labs   Lab 03/28/23 0224   *   CALCIUM 7.8*   ALBUMIN 2.6*   PROT 5.6*      K 4.4   CO2 22*      BUN 40*   CREATININE 3.4*   ALKPHOS 166*   ALT 40   AST 50*   BILITOT 0.6     All labs within the past 24 hours have been reviewed.

## 2023-03-28 NOTE — SUBJECTIVE & OBJECTIVE
Medications Prior to Admission   Medication Sig Dispense Refill Last Dose    albuterol (PROVENTIL/VENTOLIN HFA) 90 mcg/actuation inhaler Inhale 2 puffs into the lungs 4 (four) times daily as needed (breathing).       apixaban (ELIQUIS) 5 mg Tab Take 5 mg by mouth 2 (two) times daily.       atorvastatin (LIPITOR) 40 MG tablet Take 1 tablet (40 mg total) by mouth once daily. (Patient taking differently: Take 40 mg by mouth every evening.) 90 tablet 3     carvediloL (COREG) 3.125 MG tablet Take 2 tablets (6.25 mg total) by mouth 2 (two) times daily. 120 tablet 11     FLUoxetine 40 MG capsule Take 40 mg by mouth once daily.       fluticasone-salmeterol diskus inhaler 250-50 mcg Inhale 1 puff into the lungs 2 (two) times daily.       NIFEdipine (PROCARDIA-XL) 90 MG (OSM) 24 hr tablet Take 1 tablet (90 mg total) by mouth once daily. 30 tablet 11     sevelamer carbonate (RENVELA) 800 mg Tab Take 800 mg by mouth 3 (three) times daily.       tiotropium bromide (SPIRIVA RESPIMAT) 2.5 mcg/actuation inhaler Inhale 2 puffs into the lungs Daily.       [DISCONTINUED] cloNIDine 0.3 mg/24 hr td ptwk (CATAPRES) 0.3 mg/24 hr Place 1 patch onto the skin every 7 days.       [DISCONTINUED] hydrALAZINE (APRESOLINE) 100 MG tablet Take 1 tablet (100 mg total) by mouth every 8 (eight) hours.       acetaminophen (TYLENOL) 650 MG TbSR Take 650 mg by mouth every 8 (eight) hours as needed (pain or fever over 100.4).       albuterol-ipratropium (DUO-NEB) 2.5 mg-0.5 mg/3 mL nebulizer solution Take 3 mLs by nebulization every 4 (four) hours while awake. Rescue 6480 mL 0     artificial tears (ISOPTO TEARS) 0.5 % ophthalmic solution Place 1 drop into both eyes 6 (six) times daily. 15 mL 0     aspirin (ECOTRIN) 81 MG EC tablet Take 81 mg by mouth once daily.       cetirizine (ZYRTEC) 10 MG tablet Take 10 mg by mouth daily as needed (itching).       epoetin xavier (PROCRIT) 10,000 unit/mL injection Inject 0.7 mLs (7,000 Units total) into the skin every  Riley Adams, Sat.       erythromycin (ROMYCIN) ophthalmic ointment Place a 1/2 inch ribbon of ointment into the lower eyelid three times a day. 3.5 g 0     GLUCAGON EMERGENCY KIT, HUMAN, 1 mg injection 1 mg into the muscle as needed if CBG < 70       HYDROPHILIC CREAM TOP Apply liberal amount to affected area twice daily for dry skin       melatonin 3 mg TbDL Take 9 mg by mouth nightly as needed (sleep).       nut.tx.imp.renal fxn,lac-reduc (NEPRO CARB STEADY ORAL) Give 1 carton by mouth with each meal.       ondansetron (ZOFRAN) 8 MG tablet Take 1 tablet by mouth 3 (three) times daily as needed.       sodium chloride (SALINE NASAL) 0.65 % nasal spray 1 spray by Nasal route as needed (dry nostril). 30 mL 12     triamcinolone acetonide 0.025% (KENALOG) 0.025 % cream Apply topically.       vitamin renal formula, B-complex-vitamin c-folic acid, (NEPHROCAP) 1 mg Cap Take 1 capsule by mouth once daily.       vits A and D-white pet-lanolin ointment Apply topically 2 (two) times daily. Apply twice daily to penis 15 g 0     white petrolatum (VASELINE) ointment Apply topically once daily. Apply small amount to both nostrils daily 5 g 0     [DISCONTINUED] triamcinolone acetonide 0.025 % Lotn Apply to the affected area twice daily          Review of patient's allergies indicates:  No Known Allergies    Past Medical History:   Diagnosis Date    Chronic kidney disease-mineral and bone disorder 10/12/2022    COPD (chronic obstructive pulmonary disease)     Diabetes mellitus type 1     ESRD on dialysis     Hypertension     Hypervolemia 2/22/2023    Hyponatremia 3/4/2023    SOB (shortness of breath) 10/12/2022    Patient with history COPD treated with Ellipta the albuterol, fluticasone-salmeterol tachypneic in the ED saturating 94% and 6 L on nasal cannula, patient does not know how many  he uses it is in nursing home.    -duo nebs Q 4  Given that patient is a poor historian, and in the setting of left lower extremity edema and  history of coagulopathy PE cannot be ruled out.  Will workup for possible infec     Past Surgical History:   Procedure Laterality Date    AV FISTULA PLACEMENT       Family History       Problem Relation (Age of Onset)    Diabetes Mother          Tobacco Use    Smoking status: Never    Smokeless tobacco: Never   Substance and Sexual Activity    Alcohol use: Not Currently    Drug use: Not on file    Sexual activity: Not Currently     Review of Systems   Eyes:  Positive for pain.   Respiratory:  Positive for shortness of breath.    Musculoskeletal:         Pain in L arm    Objective:     Vital Signs (Most Recent):  Temp: 98.2 °F (36.8 °C) (03/28/23 0850)  Pulse: 65 (03/28/23 1040)  Resp: 16 (03/28/23 0850)  BP: (!) 173/77 (03/28/23 0850)  SpO2: (!) 94 % (03/28/23 0850)   Vital Signs (24h Range):  Temp:  [97.7 °F (36.5 °C)-98.7 °F (37.1 °C)] 98.2 °F (36.8 °C)  Pulse:  [61-78] 65  Resp:  [15-19] 16  SpO2:  [93 %-100 %] 94 %  BP: (141-183)/() 173/77     Weight: 70.3 kg (154 lb 15.7 oz)  Body mass index is 22.89 kg/m².    Physical Exam  Constitutional:       General: He is not in acute distress.  Cardiovascular:      Rate and Rhythm: Normal rate.      Comments: L 2+ radial pulse  Pulmonary:      Effort: Pulmonary effort is normal. No respiratory distress.      Comments: 3L NC  Skin:     Comments: Left AVF, palpable thrill. Two site of bleeding. No skin erosion noted   Pain to palpation   Aneurysm measures roughly 5cm   Neurological:      General: No focal deficit present.      Mental Status: He is alert.               Significant Labs:  CBC:   Recent Labs   Lab 03/28/23 0224   WBC 4.48   RBC 2.38*   HGB 7.0*   HCT 22.7*      MCV 95   MCH 29.4   MCHC 30.8*     CMP:   Recent Labs   Lab 03/28/23 0224   *   CALCIUM 7.8*   ALBUMIN 2.6*   PROT 5.6*      K 4.4   CO2 22*      BUN 40*   CREATININE 3.4*   ALKPHOS 166*   ALT 40   AST 50*   BILITOT 0.6       Significant Diagnostics:  U/S 3/26:  Impression   =========     Color flow duplex imaging reveals a patent aneurysmal left Brachiobasilic AV fistula. Accelerated velocities (623 cm/s) remain noted   just beyond the arterial anastomosis; along with a smaller caliber vessel diameter, which could suggest a focal stenosis. However,   this velocity elevation appears to also be due to a possible valve remnant. The volume flow at the mid bicep measures 2951 mL/min.

## 2023-03-28 NOTE — PLAN OF CARE
Pt. Aaox4. Vss. 's. Pt. Educated on diabetic diet d/t frequent requests of ice cream and turkey sandwiches. No other significant changes during shift. Bed in lowest position. Call bell within reach. Bed alarm placed.   Problem: Device-Related Complication Risk (Hemodialysis)  Goal: Safe, Effective Therapy Delivery  Outcome: Ongoing, Progressing     Problem: Hemodynamic Instability (Hemodialysis)  Goal: Effective Tissue Perfusion  Outcome: Ongoing, Progressing     Problem: Infection (Hemodialysis)  Goal: Absence of Infection Signs and Symptoms  Outcome: Ongoing, Progressing     Problem: Adult Inpatient Plan of Care  Goal: Plan of Care Review  Outcome: Ongoing, Progressing  Goal: Patient-Specific Goal (Individualized)  Outcome: Ongoing, Progressing  Goal: Absence of Hospital-Acquired Illness or Injury  Outcome: Ongoing, Progressing  Goal: Optimal Comfort and Wellbeing  Outcome: Ongoing, Progressing  Goal: Readiness for Transition of Care  Outcome: Ongoing, Progressing     Problem: Diabetes Comorbidity  Goal: Blood Glucose Level Within Targeted Range  Outcome: Ongoing, Progressing

## 2023-03-28 NOTE — PLAN OF CARE
03/27/23 1223   Discharge Reassessment   Assessment Type Discharge Planning Reassessment   Did the patient's condition or plan change since previous assessment? No   Discharge Plan discussed with: Patient   Name(s) and Number(s) Angel [phone]   Discharge Plan A Return to nursing home   DME Needed Upon Discharge  none   Discharge Barriers Identified None   Post-Acute Status   Post-Acute Authorization   (NH return)   Hospital Resources/Appts/Education Provided Appointments scheduled and added to AVS   Discharge Delays None known at this time     CM spoke to patient and patient to return to [facility]. Updated notes sent via [facility]. CM called and spoke to Centralized admissions:     Blanca Brown RN  Case Management  [facility]  [phone]

## 2023-03-28 NOTE — CONSULTS
Consultation Report  Ophthalmology Service    Date: 03/28/2023    Chief complaint/Reason for Consult: conjunctival hemorrhage: mild pain with movement     History of Present Illness: Cosme Lewis is a 57 y.o. male who presents with subconj     Pt was in hospital when occurred. Woke up 3 days ago with bleed on white of eye and very mild soreness.   Patient denies any visual changes, visual disturbances, such as flashes, floaters, or curtain-veil in visual field, diplopia.     Pt is on apixiban.     POcularHx: No history of ocular problems or past ocular surgeries.  Wears glasses to read    Current eye gtts: none      PMHx:  has a past medical history of Chronic kidney disease-mineral and bone disorder (10/12/2022), COPD (chronic obstructive pulmonary disease), Diabetes mellitus type 1, ESRD on dialysis, Hypertension, Hypervolemia (2/22/2023), Hyponatremia (3/4/2023), and SOB (shortness of breath) (10/12/2022).     PSurgHx:  has a past surgical history that includes AV fistula placement.     Home Medications:   Prior to Admission medications    Medication Sig Start Date End Date Taking? Authorizing Provider   albuterol (PROVENTIL/VENTOLIN HFA) 90 mcg/actuation inhaler Inhale 2 puffs into the lungs 4 (four) times daily as needed (breathing). 9/2/22  Yes Historical Provider   apixaban (ELIQUIS) 5 mg Tab Take 5 mg by mouth 2 (two) times daily.   Yes Historical Provider   atorvastatin (LIPITOR) 40 MG tablet Take 1 tablet (40 mg total) by mouth once daily.  Patient taking differently: Take 40 mg by mouth every evening. 10/15/22 10/15/23 Yes Odessa Borges, DO   carvediloL (COREG) 3.125 MG tablet Take 2 tablets (6.25 mg total) by mouth 2 (two) times daily. 11/23/22 11/23/23 Yes Britt Mason MD   FLUoxetine 40 MG capsule Take 40 mg by mouth once daily. 10/2/22  Yes Historical Provider   fluticasone-salmeterol diskus inhaler 250-50 mcg Inhale 1 puff into the lungs 2 (two) times daily. 6/7/22  Yes Historical Provider    NIFEdipine (PROCARDIA-XL) 90 MG (OSM) 24 hr tablet Take 1 tablet (90 mg total) by mouth once daily. 11/30/22 11/30/23 Yes Landon Darby PA-C   sevelamer carbonate (RENVELA) 800 mg Tab Take 800 mg by mouth 3 (three) times daily. 3/9/23  Yes Historical Provider   tiotropium bromide (SPIRIVA RESPIMAT) 2.5 mcg/actuation inhaler Inhale 2 puffs into the lungs Daily. 9/2/22  Yes Historical Provider   cloNIDine 0.3 mg/24 hr td ptwk (CATAPRES) 0.3 mg/24 hr Place 1 patch onto the skin every 7 days. 9/8/22 3/28/23 Yes Historical Provider   hydrALAZINE (APRESOLINE) 100 MG tablet Take 1 tablet (100 mg total) by mouth every 8 (eight) hours. 11/23/22 3/28/23 Yes Britt Mason MD   insulin detemir U-100 (LEVEMIR FLEXTOUCH) 100 unit/mL (3 mL) SubQ InPn pen Inject 7 Units into the skin 2 (two) times daily. 3/7/23 3/28/23 Yes Dahlia Balbuena MD   insulin lispro (HUMALOG U-100 INSULIN) 100 unit/mL injection Inject 5 Units into the skin 3 (three) times daily before meals. 3/7/23 3/28/23 Yes Dahlia Balbuena MD   isosorbide mononitrate (IMDUR) 30 MG 24 hr tablet Take 30 mg by mouth 2 (two) times daily. 10/10/22 3/28/23 Yes Historical Provider   acetaminophen (TYLENOL) 650 MG TbSR Take 650 mg by mouth every 8 (eight) hours as needed (pain or fever over 100.4).    Historical Provider   albuterol-ipratropium (DUO-NEB) 2.5 mg-0.5 mg/3 mL nebulizer solution Take 3 mLs by nebulization every 4 (four) hours while awake. Rescue 3/20/23 3/19/24  Cecilia Dorman MD   artificial tears (ISOPTO TEARS) 0.5 % ophthalmic solution Place 1 drop into both eyes 6 (six) times daily. 3/20/23   Cecilia Dorman MD   aspirin (ECOTRIN) 81 MG EC tablet Take 81 mg by mouth once daily. 9/28/22   Historical Provider   cetirizine (ZYRTEC) 10 MG tablet Take 10 mg by mouth daily as needed (itching).    Historical Provider   epoetin xavier (PROCRIT) 10,000 unit/mL injection Inject 0.7 mLs (7,000 Units total) into the skin every Tues, Thurs, Sat. 3/16/23    Nuvia Gould DO   erythromycin (ROMYCIN) ophthalmic ointment Place a 1/2 inch ribbon of ointment into the lower eyelid three times a day. 2/10/23   Angela Mejia MD   GLUCAGON EMERGENCY KIT, HUMAN, 1 mg injection 1 mg into the muscle as needed if CBG < 70 9/2/22   Historical Provider   HYDROPHILIC CREAM TOP Apply liberal amount to affected area twice daily for dry skin    Historical Provider   insulin detemir U-100, Levemir, 100 unit/mL (3 mL) SubQ InPn pen Inject 8 Units into the skin 2 (two) times daily. 3/28/23 3/27/24  Eliecer Olson DO   insulin lispro (HUMALOG KWIKPEN INSULIN) 100 unit/mL pen Inject 8 Units into the skin 3 (three) times daily before meals. 3/28/23 3/27/24  Eliecer Olson DO   isosorbide mononitrate (IMDUR) 30 MG 24 hr tablet Take 1 tablet (30 mg total) by mouth once daily. 3/28/23   Eliecer Olson DO   lisinopriL (PRINIVIL,ZESTRIL) 40 MG tablet Take 1 tablet (40 mg total) by mouth every evening. 3/28/23 3/27/24  Eliecer Olson DO   melatonin 3 mg TbDL Take 9 mg by mouth nightly as needed (sleep).    Historical Provider   nut.tx.imp.renal fxn,lac-reduc (NEPRO CARB STEADY ORAL) Give 1 carton by mouth with each meal.    Historical Provider   ondansetron (ZOFRAN) 8 MG tablet Take 1 tablet by mouth 3 (three) times daily as needed. 3/10/23   Historical Provider   sodium chloride (SALINE NASAL) 0.65 % nasal spray 1 spray by Nasal route as needed (dry nostril). 3/21/23   Stephen Riddle MD   triamcinolone acetonide 0.025% (KENALOG) 0.025 % cream Apply topically. 3/20/23   Historical Provider   vitamin renal formula, B-complex-vitamin c-folic acid, (NEPHROCAP) 1 mg Cap Take 1 capsule by mouth once daily. 9/28/22   Historical Provider   vits A and D-white pet-lanolin ointment Apply topically 2 (two) times daily. Apply twice daily to penis 3/20/23   Cecilia Dorman MD   white petrolatum (VASELINE) ointment Apply topically once daily. Apply small amount to both nostrils daily 3/21/23    Stephen Riddle MD        Medications this encounter:    [START ON 3/29/2023] sodium chloride 0.9%   Intravenous Once    apixaban  5 mg Oral BID    aspirin  81 mg Oral Daily    atorvastatin  40 mg Oral Daily    carvediloL  6.25 mg Oral BID    [START ON 3/29/2023] epoetin xavier (PROCRIT) injection  4,000 Units Intravenous Every Mon, Wed, Fri    FLUoxetine  40 mg Oral Daily    fluticasone furoate-vilanteroL  1 puff Inhalation Daily    insulin aspart U-100  6 Units Subcutaneous TIDWM    insulin detemir U-100  10 Units Subcutaneous BID    isosorbide mononitrate  30 mg Oral Daily    lisinopriL  40 mg Oral QHS    NIFEdipine  90 mg Oral Daily    tiotropium bromide  2 puff Inhalation Daily       Allergies: has No Known Allergies.     Social:  reports that he has never smoked. He has never used smokeless tobacco. He reports that he does not currently use alcohol.     Family Hx: No family history of glaucoma, macular degeneration, or blindness. family history includes Diabetes in his mother.     ROS: see hpi     Ocular examination/Dilated fundus examination:  Base Eye Exam       Visual Acuity (near card)         Right Left    Dist cc 20/25 20/25      Correction: Glasses              Tonometry (Tonopen, 3:53 PM)         Right Left    Pressure 17 15              Pupils         Pupils Dark Light Shape React APD    Right PERRL 3 1 Round Brisk None    Left PERRL 3 1 Round Brisk None              Visual Fields (Counting fingers)         Right Left     Full Full              Extraocular Movement         Right Left     Full, Ortho Full, Ortho              Dilation       Both eyes: 1% Mydriacyl, 2.5% Phenylephrine @ 3:53 PM                  Slit Lamp and Fundus Exam       External Exam         Right Left    External Normal Normal              Pen Light Exam         Right Left    Lids/Lashes Normal, prolapsed orbital fat Normal    Conjunctiva/Sclera White and quiet, ping ping, flat patchy monet worse temporal and nasal, clear superior,  no chemosis    Cornea Clear Clear    Anterior Chamber Deep and formed Deep and formed    Iris Round and reactive Round and reactive    Lens nsc, cc nsc, cc    Anterior Vitreous Normal Normal              Fundus Exam         Right Left    Disc Normal, no nv Normal, no nv    C/D Ratio .15 .15    Macula early erm, no retinopathy early erm, no retinopathy    Vessels attenuated attenuated    Periphery scattered drusen,1 dbh, no holes tears detachments patchy pigmentation temporally, near temporal small moran spot, no holes/tears detachments, scattered drusen                  =======================================    Assessment/Plan:   1. Sub-Conjunctival Hemorrhage, left eye eye  - mild, flat on exam, no corneal abrasion/dellen  - already seen by ophthalmology for this left eye monet 3/19/23, stable on exam today, resolving   - No evidence of hyphema or open globe underlying conjunctiva.   - Occurred during sleep at hospital, no trauma. Likely hit eye while sleeping which would explain soreness as well  - pt on apixiban currently, likely contributing factor   - Recommend to avoid valsalva, heavy lifting, straining, heavy coughing.   - Artifical tears 2-4X/day (OTC) PRN for comfort if irritation occurs.   - Will resolve on its own with time. Patient informed that can take days to 2-3 weeks to resolved and may turn yellowish color.  - NO INTERVENTION INDICATED     2. DM2/htn retinopathy   - 1 small moran spot seen in OS, attenuated vessels ou with poor vasculature in periphery  - uncontrolled dm La1c: 9.5  - in hospital for uncontrolled htn   - bp and bs control per primary   - have already scheduled pt for follow up exam in retina clinic               If there are further questions, please page the on call ophthalmology resident.    Jona Castillo MD  PGY2, Ophthalmology Resident  03/28/2023  3:55 PM

## 2023-03-28 NOTE — ASSESSMENT & PLAN NOTE
Pt describing mild discomfort in L eye without any severe pain. Will evaluate the need for an ophthalmology consult.

## 2023-03-28 NOTE — ASSESSMENT & PLAN NOTE
Admitted for Hypertensive urgency and hyperglycemia. Chest x ray with bilateral edema. Echo on 3/20 EF 55%, CVP 15    Outpatient HD Information:     -Outpatient HD unit: [facility]   -HD tx days: MWF   -HD tx time: 4hrs   -Last HD tx prior to presentation: 3/20/23  -HD access: LUE AVF   -HD modality: iHD   -Residual urine: Anuric      3/22:  HD completed, net UF of 4L.     Plan/Recommendations:  -UMA AVF with aneurysm. Vascular following, ok to continue to use per vascular, recommends not accessing  fistula at aneurysmal component and continued evaluation of AVF aneurysm as outpatient.  -continue MWF schedule as outpatient   -Strict I/O's and daily weights  -Daily renal function panels   -Renally dose meds

## 2023-03-28 NOTE — HPI
Cosme Lewis is a 56 yo male hx of ESRD on HD (M/W/F) with L AV fistula (c/b non-maturation s/p branch ligation and proximal stenosis on prior fistulagram s/p retrograde radial stick and balloon angioplasty of fistula 2018), HTN, DM, COPD and HFpEF on 3 L home O2 admitted to MICU for hypertensive emergency and hyperglycemia. Off Cardene and insulin gtt. Planning for discharge soon.    Vascular surgery consulted for evaluation of AVF due to pain and aneurysm seen on vasc US.  US showing patent aneurysmal left Brachiobasilic AV fistula with adequate flow rate, possible focal stenosis, however possibly due to valve remnant.  Last received HD 3/27 without access or flow issues other than pain during access.

## 2023-03-28 NOTE — NURSING
Blodo sugar 386 at 2110 not registering in epic. Patient received 5 units of insulin and rechecked at this time blood sugar is 306. Patient constantly asking for food. Patient request denied at this time till blood sugar stabilizes. Will continue to monitor blood sugar.

## 2023-03-28 NOTE — ASSESSMENT & PLAN NOTE
Cosme Lewis 58 yo male ESDR on HD (M/W/F) with L AV fistula (s/p retrograde radial stick and balloon angioplasty of fistula 2018), HTN, DM, COPD and HFpEF.  Pain and AVF aneurysm seen on US.     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  Adequate flow rates on UA. No issues with HD access 3/27  - AVF aneurysm appears stable from US on 3/13  - recommend outpatient follow up in Dr. Rojo in clinic for continued evaluation of AVF aneurysm

## 2023-03-28 NOTE — PLAN OF CARE
Client is alert and oriented x 3. Vitals are stable no other significant changes noted throughout the shift.    Problem: Device-Related Complication Risk (Hemodialysis)  Goal: Safe, Effective Therapy Delivery  Outcome: Ongoing, Progressing     Problem: Hemodynamic Instability (Hemodialysis)  Goal: Effective Tissue Perfusion  Outcome: Ongoing, Progressing     Problem: Infection (Hemodialysis)  Goal: Absence of Infection Signs and Symptoms  Outcome: Ongoing, Progressing     Problem: Adult Inpatient Plan of Care  Goal: Plan of Care Review  Outcome: Ongoing, Progressing  Goal: Patient-Specific Goal (Individualized)  Outcome: Ongoing, Progressing  Goal: Absence of Hospital-Acquired Illness or Injury  Outcome: Ongoing, Progressing  Goal: Optimal Comfort and Wellbeing  Outcome: Ongoing, Progressing  Goal: Readiness for Transition of Care  Outcome: Ongoing, Progressing     Problem: Diabetes Comorbidity  Goal: Blood Glucose Level Within Targeted Range  Outcome: Ongoing, Progressing     Problem: Skin Injury Risk Increased  Goal: Skin Health and Integrity  Outcome: Ongoing, Progressing     Problem: Fall Injury Risk  Goal: Absence of Fall and Fall-Related Injury  Outcome: Ongoing, Progressing     Problem: Impaired Wound Healing  Goal: Optimal Wound Healing  Outcome: Ongoing, Progressing     Problem: Electrolyte Imbalance (Chronic Kidney Disease)  Goal: Electrolyte Balance  Outcome: Ongoing, Progressing     Problem: Fluid Volume Excess (Chronic Kidney Disease)  Goal: Fluid Balance  Outcome: Ongoing, Progressing

## 2023-03-28 NOTE — SUBJECTIVE & OBJECTIVE
Interval History: Pt describing non-specific eye issues this morning. Pt had diffuse scleral hemorrhage on exam with mild pain. Pt not describing any issues with HD yesterday. Pt still describing shortness of breath with some audible wheeze/transmitted upper airway sounds this morning. No other complaints at this time.     Review of Systems   Constitutional:  Negative for activity change, appetite change, chills, fatigue, fever and unexpected weight change.   HENT:  Negative for trouble swallowing and voice change.    Eyes:  Positive for pain and redness. Negative for visual disturbance.   Respiratory:  Positive for cough and shortness of breath. Negative for chest tightness and wheezing.    Cardiovascular:  Negative for chest pain, palpitations and leg swelling.   Gastrointestinal:  Negative for abdominal distention, abdominal pain, anal bleeding, blood in stool, constipation, diarrhea, nausea, rectal pain and vomiting.   Endocrine: Negative.    Genitourinary: Negative.    Musculoskeletal:  Negative for back pain, neck pain and neck stiffness.   Skin:  Negative for pallor, rash and wound.   Allergic/Immunologic: Negative.    Neurological:  Negative for dizziness, seizures, syncope, weakness, light-headedness, numbness and headaches.   Hematological: Negative.    Psychiatric/Behavioral:  Negative for confusion and sleep disturbance. The patient is not nervous/anxious.    All other systems reviewed and are negative.  Objective:     Vital Signs (Most Recent):  Temp: 97.6 °F (36.4 °C) (03/28/23 1225)  Pulse: 64 (03/28/23 1225)  Resp: 18 (03/28/23 1225)  BP: (!) 143/64 (03/28/23 1225)  SpO2: (!) 90 % (03/28/23 1225)   Vital Signs (24h Range):  Temp:  [97.6 °F (36.4 °C)-98.7 °F (37.1 °C)] 97.6 °F (36.4 °C)  Pulse:  [64-78] 64  Resp:  [16-19] 18  SpO2:  [90 %-100 %] 90 %  BP: (141-178)/(64-87) 143/64     Weight: 70.3 kg (154 lb 15.7 oz)  Body mass index is 22.89 kg/m².    Intake/Output Summary (Last 24 hours) at 3/28/2023  1258  Last data filed at 3/27/2023 1414  Gross per 24 hour   Intake 350 ml   Output --   Net 350 ml      Physical Exam  Vitals and nursing note reviewed.   Constitutional:       General: He is awake. He is not in acute distress.     Appearance: He is ill-appearing. He is not diaphoretic.      Interventions: Nasal cannula in place.   HENT:      Head: Normocephalic and atraumatic.      Right Ear: External ear normal.      Left Ear: External ear normal.      Nose: Nose normal.      Mouth/Throat:      Mouth: Mucous membranes are dry.      Pharynx: Oropharynx is clear. No oropharyngeal exudate or posterior oropharyngeal erythema.      Comments: Poor dentition.  Eyes:      General: No scleral icterus.        Right eye: No discharge.         Left eye: No discharge.      Extraocular Movements: Extraocular movements intact.      Comments: Left conjunctival hemorrhage noted.    Cardiovascular:      Rate and Rhythm: Normal rate.      Heart sounds: Murmur heard.   Systolic murmur is present with a grade of 1/6.     No friction rub. No gallop.      Arteriovenous access: Left arteriovenous access is present.     Comments: Left upper extremity AVF with palpable thrill and audible bruit.  Pulmonary:      Effort: Pulmonary effort is normal. No respiratory distress.      Breath sounds: No wheezing, rhonchi or rales.   Abdominal:      General: Bowel sounds are normal. There is no distension.      Palpations: Abdomen is soft.      Tenderness: There is no abdominal tenderness.   Musculoskeletal:      Cervical back: Neck supple.      Right lower leg: No edema.      Left lower leg: No edema.   Skin:     General: Skin is warm and dry.      Coloration: Skin is not jaundiced.      Findings: Bruising present.   Neurological:      General: No focal deficit present.      Mental Status: He is alert. Mental status is at baseline.      Cranial Nerves: No cranial nerve deficit.      Comments: Grossly hard of hearing.   Psychiatric:         Mood and  Affect: Mood normal.         Behavior: Behavior normal. Behavior is cooperative.       Significant Labs: All pertinent labs within the past 24 hours have been reviewed.    Significant Imaging: I have reviewed all pertinent imaging results/findings within the past 24 hours.

## 2023-03-28 NOTE — ASSESSMENT & PLAN NOTE
Patient's FSGs are controlled on current medication regimen.  Last A1c reviewed-   Lab Results   Component Value Date    HGBA1C 9.5 (H) 03/06/2023     Most recent fingerstick glucose reviewed-   Recent Labs   Lab 03/27/23  2336 03/28/23  0059 03/28/23  0852 03/28/23  1227   POCTGLUCOSE 258* 253* 357* 391*     Current correctional scale  Low  Increase anti-hyperglycemic dose as follows-   Antihyperglycemics (From admission, onward)    Start     Stop Route Frequency Ordered    03/28/23 0715  insulin aspart U-100 pen 6 Units         -- SubQ 3 times daily with meals 03/28/23 0649    03/27/23 2100  insulin detemir U-100 (Levemir) pen 8 Units         -- SubQ 2 times daily 03/27/23 1013    03/24/23 0225  insulin aspart U-100 pen 0-5 Units         -- SubQ Before meals & nightly PRN 03/24/23 0126        Hold Oral hypoglycemics while patient is in the hospital.

## 2023-03-29 NOTE — CONSULTS
[facility] ([place] [address])  Vascular Surgery  Consult Note    Inpatient consult to Vascular Surgery  Consult performed by: Elizabeth Bocanegra MD  Consult ordered by: Yash De La O MD        Subjective:       History of Present Illness: Cosme Lewis is a 58 yo male hx of ESRD on HD (M/W/F) with L AV fistula (c/b non-maturation s/p branch ligation and proximal stenosis on prior fistulagram s/p retrograde radial stick and balloon angioplasty of fistula 2018), HTN, DM, COPD and HFpEF on 3 L home O2 admitted to MICU for hypertensive emergency and hyperglycemia. Off Cardene and insulin gtt. Planning for discharge soon.    Vascular surgery consulted for evaluation of AVF due to pain and aneurysm seen on vasc US.  US showing patent aneurysmal left Brachiobasilic AV fistula with adequate flow rate, possible focal stenosis, however possibly due to valve remnant.  Last received HD 3/27 without access or flow issues other than pain during access.       Medications Prior to Admission   Medication Sig Dispense Refill Last Dose    albuterol (PROVENTIL/VENTOLIN HFA) 90 mcg/actuation inhaler Inhale 2 puffs into the lungs 4 (four) times daily as needed (breathing).       apixaban (ELIQUIS) 5 mg Tab Take 5 mg by mouth 2 (two) times daily.       atorvastatin (LIPITOR) 40 MG tablet Take 1 tablet (40 mg total) by mouth once daily. (Patient taking differently: Take 40 mg by mouth every evening.) 90 tablet 3     carvediloL (COREG) 3.125 MG tablet Take 2 tablets (6.25 mg total) by mouth 2 (two) times daily. 120 tablet 11     FLUoxetine 40 MG capsule Take 40 mg by mouth once daily.       fluticasone-salmeterol diskus inhaler 250-50 mcg Inhale 1 puff into the lungs 2 (two) times daily.       NIFEdipine (PROCARDIA-XL) 90 MG (OSM) 24 hr tablet Take 1 tablet (90 mg total) by mouth once daily. 30 tablet 11     sevelamer carbonate (RENVELA) 800 mg Tab Take 800 mg by mouth 3 (three) times daily.       tiotropium bromide (SPIRIVA RESPIMAT)  2.5 mcg/actuation inhaler Inhale 2 puffs into the lungs Daily.       [DISCONTINUED] cloNIDine 0.3 mg/24 hr td ptwk (CATAPRES) 0.3 mg/24 hr Place 1 patch onto the skin every 7 days.       [DISCONTINUED] hydrALAZINE (APRESOLINE) 100 MG tablet Take 1 tablet (100 mg total) by mouth every 8 (eight) hours.       acetaminophen (TYLENOL) 650 MG TbSR Take 650 mg by mouth every 8 (eight) hours as needed (pain or fever over 100.4).       albuterol-ipratropium (DUO-NEB) 2.5 mg-0.5 mg/3 mL nebulizer solution Take 3 mLs by nebulization every 4 (four) hours while awake. Rescue 6480 mL 0     artificial tears (ISOPTO TEARS) 0.5 % ophthalmic solution Place 1 drop into both eyes 6 (six) times daily. 15 mL 0     aspirin (ECOTRIN) 81 MG EC tablet Take 81 mg by mouth once daily.       cetirizine (ZYRTEC) 10 MG tablet Take 10 mg by mouth daily as needed (itching).       epoetin xavier (PROCRIT) 10,000 unit/mL injection Inject 0.7 mLs (7,000 Units total) into the skin every Tues, Thurs, Sat.       erythromycin (ROMYCIN) ophthalmic ointment Place a 1/2 inch ribbon of ointment into the lower eyelid three times a day. 3.5 g 0     GLUCAGON EMERGENCY KIT, HUMAN, 1 mg injection 1 mg into the muscle as needed if CBG < 70       HYDROPHILIC CREAM TOP Apply liberal amount to affected area twice daily for dry skin       melatonin 3 mg TbDL Take 9 mg by mouth nightly as needed (sleep).       nut.tx.imp.renal fxn,lac-reduc (NEPRO CARB STEADY ORAL) Give 1 carton by mouth with each meal.       ondansetron (ZOFRAN) 8 MG tablet Take 1 tablet by mouth 3 (three) times daily as needed.       sodium chloride (SALINE NASAL) 0.65 % nasal spray 1 spray by Nasal route as needed (dry nostril). 30 mL 12     triamcinolone acetonide 0.025% (KENALOG) 0.025 % cream Apply topically.       vitamin renal formula, B-complex-vitamin c-folic acid, (NEPHROCAP) 1 mg Cap Take 1 capsule by mouth once daily.       vits A and D-white pet-lanolin ointment Apply topically 2 (two) times  daily. Apply twice daily to penis 15 g 0     white petrolatum (VASELINE) ointment Apply topically once daily. Apply small amount to both nostrils daily 5 g 0     [DISCONTINUED] triamcinolone acetonide 0.025 % Lotn Apply to the affected area twice daily          Review of patient's allergies indicates:  No Known Allergies    Past Medical History:   Diagnosis Date    Chronic kidney disease-mineral and bone disorder 10/12/2022    COPD (chronic obstructive pulmonary disease)     Diabetes mellitus type 1     ESRD on dialysis     Hypertension     Hypervolemia 2/22/2023    Hyponatremia 3/4/2023    SOB (shortness of breath) 10/12/2022    Patient with history COPD treated with Ellipta the albuterol, fluticasone-salmeterol tachypneic in the ED saturating 94% and 6 L on nasal cannula, patient does not know how many  he uses it is in nursing home.    -duo nebs Q 4  Given that patient is a poor historian, and in the setting of left lower extremity edema and history of coagulopathy PE cannot be ruled out.  Will workup for possible infec     Past Surgical History:   Procedure Laterality Date    AV FISTULA PLACEMENT       Family History       Problem Relation (Age of Onset)    Diabetes Mother          Tobacco Use    Smoking status: Never    Smokeless tobacco: Never   Substance and Sexual Activity    Alcohol use: Not Currently    Drug use: Not on file    Sexual activity: Not Currently     Review of Systems   Eyes:  Positive for pain.   Respiratory:  Positive for shortness of breath.    Musculoskeletal:         Pain in L arm    Objective:     Vital Signs (Most Recent):  Temp: 98.2 °F (36.8 °C) (03/28/23 0850)  Pulse: 65 (03/28/23 1040)  Resp: 16 (03/28/23 0850)  BP: (!) 173/77 (03/28/23 0850)  SpO2: (!) 94 % (03/28/23 0850)   Vital Signs (24h Range):  Temp:  [97.7 °F (36.5 °C)-98.7 °F (37.1 °C)] 98.2 °F (36.8 °C)  Pulse:  [61-78] 65  Resp:  [15-19] 16  SpO2:  [93 %-100 %] 94 %  BP: (141-183)/() 173/77     Weight: 70.3 kg (154  lb 15.7 oz)  Body mass index is 22.89 kg/m².    Physical Exam  Constitutional:       General: He is not in acute distress.  Cardiovascular:      Rate and Rhythm: Normal rate.      Comments: L 2+ radial pulse  Pulmonary:      Effort: Pulmonary effort is normal. No respiratory distress.      Comments: 3L NC  Skin:     Comments: Left AVF, palpable thrill. Two site of bleeding. No skin erosion noted   Pain to palpation   Aneurysm measures roughly 5cm   Neurological:      General: No focal deficit present.      Mental Status: He is alert.               Significant Labs:  CBC:   Recent Labs   Lab 03/28/23 0224   WBC 4.48   RBC 2.38*   HGB 7.0*   HCT 22.7*      MCV 95   MCH 29.4   MCHC 30.8*     CMP:   Recent Labs   Lab 03/28/23 0224   *   CALCIUM 7.8*   ALBUMIN 2.6*   PROT 5.6*      K 4.4   CO2 22*      BUN 40*   CREATININE 3.4*   ALKPHOS 166*   ALT 40   AST 50*   BILITOT 0.6       Significant Diagnostics:  U/S 3/26: Impression   =========     Color flow duplex imaging reveals a patent aneurysmal left Brachiobasilic AV fistula. Accelerated velocities (623 cm/s) remain noted   just beyond the arterial anastomosis; along with a smaller caliber vessel diameter, which could suggest a focal stenosis. However,   this velocity elevation appears to also be due to a possible valve remnant. The volume flow at the mid bicep measures 2951 mL/min.     Assessment/Plan:     Anemia in ESRD (end-stage renal disease)  Cosme Lewis 56 yo male ESDR on HD (M/W/F) with L AV fistula (s/p retrograde radial stick and balloon angioplasty of fistula 2018), HTN, DM, COPD and HFpEF.  Pain and AVF aneurysm seen on US.     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  Adequate flow rates on UA. No issues with HD access 3/27  - AVF aneurysm appears stable from US on 3/13  - recommend outpatient follow up in Dr. Rojo in clinic for continued evaluation of AVF aneurysm      Thank you for your consult. I  will follow-up with patient. Please contact us if you have any additional questions.    Elizabeth Bocanegra MD  Vascular Surgery  [facility] - [facility] ([facility]    Vascular Attending  Agree with above  56 yo man with a L BVT AVF - that is aneurysmal max approx 5cm -- appear on exam to be true aneurysms.  No recent growth and lucrecia HD well. Strong thrill wihtou pulsatility.   He does not recall where or when it was created  Can f/u in clinic in 3-6 months for surveillance and future plication    Lul Rojo MD DFS FACS   Vascular/Endovascular Surgery    I have seen the patient and reviewed the resident's/fellow's note. I have also personally interviewed and examined the patient at bedside and agree with the findings.  Time spent personally reviewing the patient's chart, interpreting images and test, and conferring with physicians was 70 mins.

## 2023-03-29 NOTE — PROGRESS NOTES
[facility] ([place] [address])  Vascular Surgery  Progress Note    Patient Name: Cosme Lewis  MRN: [id number]  Admission Date: 3/22/2023  Primary Care Provider: Primary Doctor No    Subjective:     Interval History: Patient seen and examined. No acute events. To follow up outpatient with vascular surgery.     Post-Op Info:  * No surgery found *           Medications:  Continuous Infusions:  Scheduled Meds:   sodium chloride 0.9%   Intravenous Once    apixaban  5 mg Oral BID    aspirin  81 mg Oral Daily    atorvastatin  40 mg Oral Daily    carvediloL  6.25 mg Oral BID    epoetin xavier (PROCRIT) injection  4,000 Units Intravenous Every Mon, Wed, Fri    FLUoxetine  40 mg Oral Daily    fluticasone furoate-vilanteroL  1 puff Inhalation Daily    insulin aspart U-100  6 Units Subcutaneous TIDWM    insulin detemir U-100  10 Units Subcutaneous BID    isosorbide mononitrate  30 mg Oral Daily    lisinopriL  40 mg Oral QHS    NIFEdipine  90 mg Oral Daily    tiotropium bromide  2 puff Inhalation Daily     PRN Meds:sodium chloride, sodium chloride 0.9%, albuterol-ipratropium, dextrose 10%, dextrose 10%, glucagon (human recombinant), hydrALAZINE, hydrALAZINE, insulin aspart U-100, melatonin, sodium chloride 0.9%     Objective:     Vital Signs (Most Recent):  Temp: 98.4 °F (36.9 °C) (03/29/23 0433)  Pulse: 65 (03/29/23 0433)  Resp: 18 (03/28/23 2343)  BP: (!) 179/81 (03/29/23 0433)  SpO2: 95 % (03/29/23 0433)   Vital Signs (24h Range):  Temp:  [97.6 °F (36.4 °C)-98.5 °F (36.9 °C)] 98.4 °F (36.9 °C)  Pulse:  [63-71] 65  Resp:  [16-18] 18  SpO2:  [88 %-95 %] 95 %  BP: (139-184)/(63-83) 179/81         Physical Exam  Constitutional:       General: He is not in acute distress.  Cardiovascular:      Rate and Rhythm: Normal rate.      Comments: L 2+ radial pulse  Pulmonary:      Effort: Pulmonary effort is normal. No respiratory distress.      Comments: 3L NC  Skin:     Comments: Left AVF, palpable thrill. Two site of  bleeding. No skin erosion noted   Pain to palpation   Aneurysm measures roughly 5cm   Neurological:      General: No focal deficit present.      Mental Status: He is alert.       Significant Labs:  CBC:   Recent Labs   Lab 03/29/23  0232   WBC 4.77   RBC 2.28*   HGB 6.6*   HCT 21.0*      MCV 92   MCH 28.9   MCHC 31.4*     CMP:   Recent Labs   Lab 03/29/23  0232   *   CALCIUM 7.8*   ALBUMIN 2.7*   PROT 5.8*   *   K 4.8   CO2 20*      BUN 57*   CREATININE 5.1*   ALKPHOS 176*   ALT 40   AST 45*   BILITOT 0.6       Significant Diagnostics:  I have reviewed all pertinent imaging results/findings within the past 24 hours.    Assessment/Plan:     Anemia in ESRD (end-stage renal disease)  Cosme Lewis 58 yo male ESDR on HD (M/W/F) with L AV fistula (s/p retrograde radial stick and balloon angioplasty of fistula 2018), HTN, DM, COPD and HFpEF.  Pain and AVF aneurysm seen on US.     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  Adequate flow rates on UA. No issues with HD access 3/27  - do not access fistula at aneurysmal component  - AVF aneurysm appears stable from US on 3/13  - recommend outpatient follow up in Dr. Rojo in clinic for continued evaluation of AVF aneurysm  - vascular surgery to sign off        Robbie Cadet MD  Vascular Surgery  [facility] - Intensive Care ([place]-)

## 2023-03-29 NOTE — DISCHARGE SUMMARY
[facility] - Intensive Care ([place] [address])  [facility] Medicine  Discharge Summary      Patient Name: Cosme Lewis  MRN: [id number]  MARCY: [id number]  Patient Class: IP- Inpatient  Admission Date: 3/22/2023  Hospital Length of Stay: 7 days  Discharge Date and Time:  03/29/2023 3:34 PM  Attending Physician: Hal Prado MD   Discharging Provider: Yash De La O MD  Primary Care Provider: [facility] Medicine Team: [facility] MED 3 Yash De La O MD  Primary Care Team: [facility] 3    HPI:   Mr. Cosme Lewis is 57 y.o. man with a history of ESRD on HD, DM, HTN, and COPD who presented to [facility] ED on 3/22 with complaints of shortness of breath and recurrent epistaxis.  He was in the ED the day prior with epistaxis that resolved and was discharged home. Found to be hyperglycemic without AG elevation or elevated BHB.  Hypertensive with SBP up to 240's and started on cardene gtt. Pt stepped down to hospital medicine where he required HD. Pt's fistula had concerns for failure but access was achieved to continue HD therapy.       * No surgery found *      Hospital Course:   Pt stepped down from the MICU with better controlled blood pressure. Pt describing pain and an enlarging fistula. 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 Vascular surgery consulted for evaluation of long-term viability of fistula. Pt using fistula currently for HD without issue. Pt began to complain of eye issues stating that someone told him he had issues with his retina. Pt had diffuse hemorrhage throughout his sclera on exam. Pt tolerating HD sessions well. Nothing to do for the conjunctival hemorrhage. Pt well enough to discharge back to his NH.      Interval History: Pt feeling better this morning. Tolerating HD this morning. Receiving a unit of blood and then will discharge back to his NH.    Review of Systems   Constitutional:  Negative for activity change, appetite change, chills, fatigue, fever and unexpected weight change.    HENT:  Negative for trouble swallowing and voice change.    Eyes:  Positive for pain and redness. Negative for visual disturbance.   Respiratory:  Negative for cough, chest tightness, shortness of breath and wheezing.    Cardiovascular:  Negative for chest pain, palpitations and leg swelling.   Gastrointestinal:  Negative for abdominal distention, abdominal pain, anal bleeding, blood in stool, constipation, diarrhea, nausea, rectal pain and vomiting.   Endocrine: Negative.    Genitourinary: Negative.    Musculoskeletal:  Negative for back pain, neck pain and neck stiffness.   Skin:  Negative for pallor, rash and wound.   Allergic/Immunologic: Negative.    Neurological:  Negative for dizziness, seizures, syncope, weakness, light-headedness, numbness and headaches.   Hematological: Negative.    Psychiatric/Behavioral:  Negative for confusion and sleep disturbance. The patient is not nervous/anxious.    All other systems reviewed and are negative.  Objective:     Vital Signs (Most Recent):  Temp: 98.7 °F (37.1 °C) (03/29/23 1140)  Pulse: 71 (03/29/23 1440)  Resp: 18 (03/29/23 1140)  BP: (!) 168/78 (03/29/23 1140)  SpO2: 97 % (03/29/23 1140)   Vital Signs (24h Range):  Temp:  [97.6 °F (36.4 °C)-98.7 °F (37.1 °C)] 98.7 °F (37.1 °C)  Pulse:  [63-71] 71  Resp:  [18] 18  SpO2:  [88 %-97 %] 97 %  BP: (139-190)/(63-93) 168/78     Weight: 70.3 kg (154 lb 15.7 oz)  Body mass index is 22.89 kg/m².    Intake/Output Summary (Last 24 hours) at 3/29/2023 1532  Last data filed at 3/29/2023 1130  Gross per 24 hour   Intake 350 ml   Output --   Net 350 ml      Physical Exam  Vitals and nursing note reviewed.   Constitutional:       General: He is awake. He is not in acute distress.     Appearance: He is ill-appearing. He is not diaphoretic.      Interventions: Nasal cannula in place.   HENT:      Head: Normocephalic and atraumatic.      Right Ear: External ear normal.      Left Ear: External ear normal.      Nose: Nose normal.       Mouth/Throat:      Mouth: Mucous membranes are dry.      Pharynx: Oropharynx is clear. No oropharyngeal exudate or posterior oropharyngeal erythema.      Comments: Poor dentition.  Eyes:      General: No scleral icterus.        Right eye: No discharge.         Left eye: No discharge.      Extraocular Movements: Extraocular movements intact.      Comments: Left conjunctival hemorrhage noted.    Cardiovascular:      Rate and Rhythm: Normal rate.      Heart sounds: Murmur heard.   Systolic murmur is present with a grade of 1/6.     No friction rub. No gallop.      Arteriovenous access: Left arteriovenous access is present.     Comments: Left upper extremity AVF with palpable thrill and audible bruit.  Pulmonary:      Effort: Pulmonary effort is normal. No respiratory distress.      Breath sounds: No wheezing, rhonchi or rales.   Abdominal:      General: Bowel sounds are normal. There is no distension.      Palpations: Abdomen is soft.      Tenderness: There is no abdominal tenderness.   Musculoskeletal:      Cervical back: Neck supple.      Right lower leg: No edema.      Left lower leg: No edema.   Skin:     General: Skin is warm and dry.      Coloration: Skin is not jaundiced.      Findings: Bruising present.   Neurological:      General: No focal deficit present.      Mental Status: He is alert. Mental status is at baseline.      Cranial Nerves: No cranial nerve deficit.      Comments: Grossly hard of hearing.   Psychiatric:         Mood and Affect: Mood normal.         Behavior: Behavior normal. Behavior is cooperative.       Significant Labs: All pertinent labs within the past 24 hours have been reviewed.    Significant Imaging: I have reviewed all pertinent imaging results/findings within the past 24 hours.    Goals of Care Treatment Preferences:  Code Status: Full Code      Consults:   Consults (From admission, onward)        Status Ordering Provider     Inpatient consult to Ophthalmology  Once        Provider:   (Not yet assigned)    Completed TIFFANY HAIDER     Inpatient consult to Vascular Surgery  Once        Provider:  (Not yet assigned)    Completed TIFFANY HAIDER     Inpatient consult to Registered Dietitian/Nutritionist  Once        Provider:  (Not yet assigned)    Completed ALIRZEA CHOI     Inpatient consult to Critical Care Medicine  Once        Provider:  (Not yet assigned)    Completed JULIANNE KNIGHT     Inpatient consult to Nephrology  Once        Provider:  (Not yet assigned)    Completed JULIANNE KNIGHT          No new Assessment & Plan notes have been filed under this hospital service since the last note was generated.  Service: Hospital Medicine    Final Active Diagnoses:    Diagnosis Date Noted POA    PRINCIPAL PROBLEM:  Hypertensive emergency [I16.1] 03/04/2023 Yes    Shortness of breath [R06.02] 03/23/2023 Yes    Primary hypertension [I10] 03/23/2023 Yes    Epistaxis [R04.0] 03/22/2023 Yes    Scleral hemorrhage of left eye [H11.32] 03/19/2023 Unknown    Type 2 diabetes mellitus with hyperglycemia, with long-term current use of insulin [E11.65, Z79.4] 03/07/2023 Not Applicable     Chronic    Anemia in ESRD (end-stage renal disease) [N18.6, D63.1] 11/18/2022 Yes     Chronic    HLD (hyperlipidemia) [E78.5] 11/18/2022 Yes     Chronic    Multiple subsegmental pulmonary emboli without acute cor pulmonale [I26.94] 10/13/2022 Yes     Chronic    Chronic hypoxemic respiratory failure [J96.11] 10/12/2022 Yes     Chronic    Chronic diastolic heart failure [I50.32] 10/12/2022 Yes     Chronic    Chronic kidney disease-mineral and bone disorder [N18.9, E83.9, M89.9] 10/12/2022 Yes    ESRD (end stage renal disease) [N18.6]  Yes      Problems Resolved During this Admission:    Diagnosis Date Noted Date Resolved POA    Pulmonary hypertension [I27.20] 12/19/2022 03/22/2023 Yes     Chronic       Discharged Condition: fair    Disposition: Skilled Nursing Facility    Follow Up:    Patient Instructions:       Ambulatory referral/consult to Pulmonology   Standing Status: Future   Referral Priority: Routine Referral Type: Consultation   Referral Reason: Specialty Services Required   Requested Specialty: Pulmonary Disease   Number of Visits Requested: 1     Ambulatory referral/consult to Endo Procedure    Standing Status: Future   Referral Priority: Routine Referral Type: Consultation   Number of Visits Requested: 1       Significant Diagnostic Studies: Labs: All labs within the past 24 hours have been reviewed    Pending Diagnostic Studies:     None         Medications:  Reconciled Home Medications:      Medication List      START taking these medications    insulin lispro 100 unit/mL pen  Commonly known as: HumaLOG KwikPen Insulin  Inject 8 Units into the skin 3 (three) times daily before meals.  Replaces: insulin lispro 100 unit/mL injection     lisinopriL 40 MG tablet  Commonly known as: PRINIVIL,ZESTRIL  Take 1 tablet (40 mg total) by mouth every evening.        CHANGE how you take these medications    atorvastatin 40 MG tablet  Commonly known as: LIPITOR  Take 1 tablet (40 mg total) by mouth once daily.  What changed: when to take this     insulin detemir U-100 (Levemir) 100 unit/mL (3 mL) Inpn pen  Inject 8 Units into the skin 2 (two) times daily.  What changed: how much to take     isosorbide mononitrate 30 MG 24 hr tablet  Commonly known as: IMDUR  Take 1 tablet (30 mg total) by mouth once daily.  What changed: when to take this     NIFEdipine 60 MG (OSM) 24 hr tablet  Commonly known as: PROCARDIA-XL  Take 1 tablet (60 mg total) by mouth 2 (two) times a day.  What changed:   · medication strength  · how much to take  · when to take this        CONTINUE taking these medications    acetaminophen 650 MG Tbsr  Commonly known as: TYLENOL  Take 650 mg by mouth every 8 (eight) hours as needed (pain or fever over 100.4).     albuterol 90 mcg/actuation inhaler  Commonly known as: PROVENTIL/VENTOLIN HFA  Inhale 2  puffs into the lungs 4 (four) times daily as needed (breathing).     albuterol-ipratropium 2.5 mg-0.5 mg/3 mL nebulizer solution  Commonly known as: DUO-NEB  Take 3 mLs by nebulization every 4 (four) hours while awake. Rescue     apixaban 5 mg Tab  Commonly known as: ELIQUIS  Take 5 mg by mouth 2 (two) times daily.     artificial tears 0.5 % ophthalmic solution  Commonly known as: ISOPTO TEARS  Place 1 drop into both eyes 6 (six) times daily.     aspirin 81 MG EC tablet  Commonly known as: ECOTRIN  Take 81 mg by mouth once daily.     carvediloL 3.125 MG tablet  Commonly known as: COREG  Take 2 tablets (6.25 mg total) by mouth 2 (two) times daily.     cetirizine 10 MG tablet  Commonly known as: ZYRTEC  Take 10 mg by mouth daily as needed (itching).     epoetin xavier 10,000 unit/mL injection  Commonly known as: PROCRIT  Inject 0.7 mLs (7,000 Units total) into the skin every Tues, Thurs, Sat.     erythromycin ophthalmic ointment  Commonly known as: ROMYCIN  Place a 1/2 inch ribbon of ointment into the lower eyelid three times a day.     FLUoxetine 40 MG capsule  Take 40 mg by mouth once daily.     fluticasone-salmeterol 250-50 mcg/dose 250-50 mcg/dose diskus inhaler  Commonly known as: ADVAIR  Inhale 1 puff into the lungs 2 (two) times daily.     GLUCAGON EMERGENCY KIT (HUMAN) 1 mg Solr  Generic drug: glucagon  1 mg into the muscle as needed if CBG < 70     HYDROPHILIC CREAM TOP  Apply liberal amount to affected area twice daily for dry skin     melatonin 3 mg Tbdl  Take 9 mg by mouth nightly as needed (sleep).     NEPRO CARB STEADY ORAL  Give 1 carton by mouth with each meal.     ondansetron 8 MG tablet  Commonly known as: ZOFRAN  Take 1 tablet by mouth 3 (three) times daily as needed.     sevelamer carbonate 800 mg Tab  Commonly known as: RENVELA  Take 800 mg by mouth 3 (three) times daily.     sodium chloride 0.65 % nasal spray  Commonly known as: Saline NasaL  1 spray by Nasal route as needed (dry nostril).      tiotropium bromide 2.5 mcg/actuation inhaler  Commonly known as: SPIRIVA RESPIMAT  Inhale 2 puffs into the lungs Daily.     triamcinolone acetonide 0.025% 0.025 % cream  Commonly known as: KENALOG  Apply topically.     vitamin renal formula (B-complex-vitamin c-folic acid) 1 mg Cap  Commonly known as: NEPHROCAP  Take 1 capsule by mouth once daily.     vits A and D-white pet-lanolin ointment  Apply topically 2 (two) times daily. Apply twice daily to penis     white petrolatum ointment  Commonly known as: VASELINE  Apply topically once daily. Apply small amount to both nostrils daily        STOP taking these medications    cloNIDine 0.3 mg/24 hr td ptwk 0.3 mg/24 hr  Commonly known as: CATAPRES     hydrALAZINE 100 MG tablet  Commonly known as: APRESOLINE     insulin lispro 100 unit/mL injection  Commonly known as: HumaLOG U-100 Insulin  Replaced by: insulin lispro 100 unit/mL pen            Indwelling Lines/Drains at time of discharge:   Lines/Drains/Airways     Drain  Duration                Hemodialysis AV Fistula Left upper arm -- days                Time spent on the discharge of patient: 40 minutes         Yash De La O MD  Department of Hospital Medicine  [facility] - Intensive Care ([place]-)

## 2023-03-29 NOTE — PLAN OF CARE
NURSING HOME ORDERS    03/29/2023  [facility]  [facility] - INTENSIVE CARE ([address] [place])  [address]  [facility] [address]  Dept: [phone]  Loc: [phone]     Admit to Nursing Home:  Skilled Nursing Facility    Diagnoses:  Active Hospital Problems    Diagnosis  POA    *Hypertensive emergency [I16.1]  Yes    Shortness of breath [R06.02]  Yes    Primary hypertension [I10]  Yes    Epistaxis [R04.0]  Yes    Scleral hemorrhage of left eye [H11.32]  Unknown    Type 2 diabetes mellitus with hyperglycemia, with long-term current use of insulin [E11.65, Z79.4]  Not Applicable     Chronic    Anemia in ESRD (end-stage renal disease) [N18.6, D63.1]  Yes     Chronic    HLD (hyperlipidemia) [E78.5]  Yes     Chronic    Multiple subsegmental pulmonary emboli without acute cor pulmonale [I26.94]  Yes     Chronic    Chronic hypoxemic respiratory failure [J96.11]  Yes     Chronic    Chronic diastolic heart failure [I50.32]  Yes     Chronic    Chronic kidney disease-mineral and bone disorder [N18.9, E83.9, M89.9]  Yes    ESRD (end stage renal disease) [N18.6]  Yes      Resolved Hospital Problems    Diagnosis Date Resolved POA    Pulmonary hypertension [I27.20] 03/22/2023 Yes     Chronic       Patient is homebound due to:  Hypertensive emergency    Allergies:Review of patient's allergies indicates:  No Known Allergies    Vitals:  Routine    Diet: diabetic diet: 2200 calorie and renal diet    Activities:   Activity as tolerated    Goals of Care Treatment Preferences:  Code Status: Full Code      Labs:  Glucose checks    Nursing Precautions:  Fall    Consults:   PT to evaluate and treat- 1 times a week and OT to evaluate and treat- 1 times a week     Miscellaneous Care: Diabetes Care: Diabetes: Check blood sugar. Fingerstick blood sugar AC and HS  Sliding Scale/Hypoglycemia Protocol: For FSG<80, give 1 amp D50 or 1 glucose tablet. For FSG , do nothing. For -200, give 1 unit of novolog in  addition to pre-meal insulin. For -250, give 2 units of novolog in addition to pre-meal insulin. For -300, give 3 units of novolog in addition to pre-meal insulin. For 301-350, give 4 units of novolog in addition to pre-meal insulin. For 351-400, give 5 units of novolog in addition to pre-meal insulin. For FSG >400, give 5 units of novolog in addition to pre-meal insulin and please call MD                   Diabetes Care:  SN to perform and educate Diabetic management with blood glucose monitoring:, Fingerstick blood sugar AC and HS, and Report CBG < 60 or > 350 to physician.      Medications: Discontinue all previous medication orders, if any. See new list below.     Medication List        START taking these medications      insulin lispro 100 unit/mL pen  Commonly known as: HumaLOG KwikPen Insulin  Inject 8 Units into the skin 3 (three) times daily before meals.  Replaces: insulin lispro 100 unit/mL injection     lisinopriL 40 MG tablet  Commonly known as: PRINIVIL,ZESTRIL  Take 1 tablet (40 mg total) by mouth every evening.            CHANGE how you take these medications      atorvastatin 40 MG tablet  Commonly known as: LIPITOR  Take 1 tablet (40 mg total) by mouth once daily.  What changed: when to take this     insulin detemir U-100 (Levemir) 100 unit/mL (3 mL) Inpn pen  Inject 8 Units into the skin 2 (two) times daily.  What changed: how much to take     isosorbide mononitrate 30 MG 24 hr tablet  Commonly known as: IMDUR  Take 1 tablet (30 mg total) by mouth once daily.  What changed: when to take this     NIFEdipine 60 MG (OSM) 24 hr tablet  Commonly known as: PROCARDIA-XL  Take 1 tablet (60 mg total) by mouth 2 (two) times a day.  What changed:   medication strength  how much to take  when to take this            CONTINUE taking these medications      acetaminophen 650 MG Tbsr  Commonly known as: TYLENOL  Take 650 mg by mouth every 8 (eight) hours as needed (pain or fever over 100.4).      albuterol 90 mcg/actuation inhaler  Commonly known as: PROVENTIL/VENTOLIN HFA  Inhale 2 puffs into the lungs 4 (four) times daily as needed (breathing).     albuterol-ipratropium 2.5 mg-0.5 mg/3 mL nebulizer solution  Commonly known as: DUO-NEB  Take 3 mLs by nebulization every 4 (four) hours while awake. Rescue     apixaban 5 mg Tab  Commonly known as: ELIQUIS  Take 5 mg by mouth 2 (two) times daily.     artificial tears 0.5 % ophthalmic solution  Commonly known as: ISOPTO TEARS  Place 1 drop into both eyes 6 (six) times daily.     aspirin 81 MG EC tablet  Commonly known as: ECOTRIN  Take 81 mg by mouth once daily.     carvediloL 3.125 MG tablet  Commonly known as: COREG  Take 2 tablets (6.25 mg total) by mouth 2 (two) times daily.     cetirizine 10 MG tablet  Commonly known as: ZYRTEC  Take 10 mg by mouth daily as needed (itching).     epoetin xavier 10,000 unit/mL injection  Commonly known as: PROCRIT  Inject 0.7 mLs (7,000 Units total) into the skin every Tues, Thurs, Sat.     erythromycin ophthalmic ointment  Commonly known as: ROMYCIN  Place a 1/2 inch ribbon of ointment into the lower eyelid three times a day.     FLUoxetine 40 MG capsule  Take 40 mg by mouth once daily.     fluticasone-salmeterol 250-50 mcg/dose 250-50 mcg/dose diskus inhaler  Commonly known as: ADVAIR  Inhale 1 puff into the lungs 2 (two) times daily.     GLUCAGON EMERGENCY KIT (HUMAN) 1 mg Solr  Generic drug: glucagon  1 mg into the muscle as needed if CBG < 70     HYDROPHILIC CREAM TOP  Apply liberal amount to affected area twice daily for dry skin     melatonin 3 mg Tbdl  Take 9 mg by mouth nightly as needed (sleep).     NEPRO CARB STEADY ORAL  Give 1 carton by mouth with each meal.     ondansetron 8 MG tablet  Commonly known as: ZOFRAN  Take 1 tablet by mouth 3 (three) times daily as needed.     sevelamer carbonate 800 mg Tab  Commonly known as: RENVELA  Take 800 mg by mouth 3 (three) times daily.     sodium chloride 0.65 % nasal  spray  Commonly known as: Saline NasaL  1 spray by Nasal route as needed (dry nostril).     tiotropium bromide 2.5 mcg/actuation inhaler  Commonly known as: SPIRIVA RESPIMAT  Inhale 2 puffs into the lungs Daily.     triamcinolone acetonide 0.025% 0.025 % cream  Commonly known as: KENALOG  Apply topically.     vitamin renal formula (B-complex-vitamin c-folic acid) 1 mg Cap  Commonly known as: NEPHROCAP  Take 1 capsule by mouth once daily.     vits A and D-white pet-lanolin ointment  Apply topically 2 (two) times daily. Apply twice daily to penis     white petrolatum ointment  Commonly known as: VASELINE  Apply topically once daily. Apply small amount to both nostrils daily            STOP taking these medications      cloNIDine 0.3 mg/24 hr td ptwk 0.3 mg/24 hr  Commonly known as: CATAPRES     hydrALAZINE 100 MG tablet  Commonly known as: APRESOLINE     insulin lispro 100 unit/mL injection  Commonly known as: HumaLOG U-100 Insulin  Replaced by: insulin lispro 100 unit/mL pen                Immunizations Administered as of 3/29/2023       Name Date Dose VIS Date Route Exp Date    COVID-19, MRNA, LN-S, PF (Moderna) 1/27/2022 -- -- -- --    COVID-19, MRNA, LN-S, PF (Pfizer) (Purple Cap) 3/2/2021 -- -- -- --    : Pf[employer] Inc     Lot: [id number]     COVID-19, MRNA, LN-S, PF (Pfizer) (Purple Cap) 2/9/2021 -- -- -- --    : Pf[employer] Inc     Lot: [id number]             This patient has had both covid vaccinations    Some patients may experience side effects after vaccination.  These may include fever, headache, muscle or joint aches.  Most symptoms resolve with 24-48 hours and do not require urgent medical evaluation unless they persist for more than 72 hours or symptoms are concerning for an unrelated medical condition.          _________________________________  Yash De La O MD  03/29/2023

## 2023-03-29 NOTE — PROGRESS NOTES
Patient received in the [facility] via bed. Maintenance dialysis started via 15 gauge fistula needles to UMA fistula. Type and screen drawn as ordered.

## 2023-03-29 NOTE — SUBJECTIVE & OBJECTIVE
Interval History: Pt feeling better this morning. Tolerating HD this morning. Receiving a unit of blood and then will discharge back to his NH.    Review of Systems   Constitutional:  Negative for activity change, appetite change, chills, fatigue, fever and unexpected weight change.   HENT:  Negative for trouble swallowing and voice change.    Eyes:  Positive for pain and redness. Negative for visual disturbance.   Respiratory:  Negative for cough, chest tightness, shortness of breath and wheezing.    Cardiovascular:  Negative for chest pain, palpitations and leg swelling.   Gastrointestinal:  Negative for abdominal distention, abdominal pain, anal bleeding, blood in stool, constipation, diarrhea, nausea, rectal pain and vomiting.   Endocrine: Negative.    Genitourinary: Negative.    Musculoskeletal:  Negative for back pain, neck pain and neck stiffness.   Skin:  Negative for pallor, rash and wound.   Allergic/Immunologic: Negative.    Neurological:  Negative for dizziness, seizures, syncope, weakness, light-headedness, numbness and headaches.   Hematological: Negative.    Psychiatric/Behavioral:  Negative for confusion and sleep disturbance. The patient is not nervous/anxious.    All other systems reviewed and are negative.  Objective:     Vital Signs (Most Recent):  Temp: 98.7 °F (37.1 °C) (03/29/23 1140)  Pulse: 71 (03/29/23 1440)  Resp: 18 (03/29/23 1140)  BP: (!) 168/78 (03/29/23 1140)  SpO2: 97 % (03/29/23 1140)   Vital Signs (24h Range):  Temp:  [97.6 °F (36.4 °C)-98.7 °F (37.1 °C)] 98.7 °F (37.1 °C)  Pulse:  [63-71] 71  Resp:  [18] 18  SpO2:  [88 %-97 %] 97 %  BP: (139-190)/(63-93) 168/78     Weight: 70.3 kg (154 lb 15.7 oz)  Body mass index is 22.89 kg/m².    Intake/Output Summary (Last 24 hours) at 3/29/2023 1532  Last data filed at 3/29/2023 1130  Gross per 24 hour   Intake 350 ml   Output --   Net 350 ml      Physical Exam  Vitals and nursing note reviewed.   Constitutional:       General: He is awake.  He is not in acute distress.     Appearance: He is ill-appearing. He is not diaphoretic.      Interventions: Nasal cannula in place.   HENT:      Head: Normocephalic and atraumatic.      Right Ear: External ear normal.      Left Ear: External ear normal.      Nose: Nose normal.      Mouth/Throat:      Mouth: Mucous membranes are dry.      Pharynx: Oropharynx is clear. No oropharyngeal exudate or posterior oropharyngeal erythema.      Comments: Poor dentition.  Eyes:      General: No scleral icterus.        Right eye: No discharge.         Left eye: No discharge.      Extraocular Movements: Extraocular movements intact.      Comments: Left conjunctival hemorrhage noted.    Cardiovascular:      Rate and Rhythm: Normal rate.      Heart sounds: Murmur heard.   Systolic murmur is present with a grade of 1/6.     No friction rub. No gallop.      Arteriovenous access: Left arteriovenous access is present.     Comments: Left upper extremity AVF with palpable thrill and audible bruit.  Pulmonary:      Effort: Pulmonary effort is normal. No respiratory distress.      Breath sounds: No wheezing, rhonchi or rales.   Abdominal:      General: Bowel sounds are normal. There is no distension.      Palpations: Abdomen is soft.      Tenderness: There is no abdominal tenderness.   Musculoskeletal:      Cervical back: Neck supple.      Right lower leg: No edema.      Left lower leg: No edema.   Skin:     General: Skin is warm and dry.      Coloration: Skin is not jaundiced.      Findings: Bruising present.   Neurological:      General: No focal deficit present.      Mental Status: He is alert. Mental status is at baseline.      Cranial Nerves: No cranial nerve deficit.      Comments: Grossly hard of hearing.   Psychiatric:         Mood and Affect: Mood normal.         Behavior: Behavior normal. Behavior is cooperative.       Significant Labs: All pertinent labs within the past 24 hours have been reviewed.    Significant Imaging: I have  reviewed all pertinent imaging results/findings within the past 24 hours.

## 2023-03-29 NOTE — PROGRESS NOTES
Pt will be transported to [facility] around 4:30pm; Called and gave Lynch report on Pt; Pt will discharge with one IV site on his right forearm, the other one was leaking and removed.

## 2023-03-29 NOTE — SUBJECTIVE & OBJECTIVE
Medications:  Continuous Infusions:  Scheduled Meds:   sodium chloride 0.9%   Intravenous Once    apixaban  5 mg Oral BID    aspirin  81 mg Oral Daily    atorvastatin  40 mg Oral Daily    carvediloL  6.25 mg Oral BID    epoetin xavier (PROCRIT) injection  4,000 Units Intravenous Every Mon, Wed, Fri    FLUoxetine  40 mg Oral Daily    fluticasone furoate-vilanteroL  1 puff Inhalation Daily    insulin aspart U-100  6 Units Subcutaneous TIDWM    insulin detemir U-100  10 Units Subcutaneous BID    isosorbide mononitrate  30 mg Oral Daily    lisinopriL  40 mg Oral QHS    NIFEdipine  90 mg Oral Daily    tiotropium bromide  2 puff Inhalation Daily     PRN Meds:sodium chloride, sodium chloride 0.9%, albuterol-ipratropium, dextrose 10%, dextrose 10%, glucagon (human recombinant), hydrALAZINE, hydrALAZINE, insulin aspart U-100, melatonin, sodium chloride 0.9%     Objective:     Vital Signs (Most Recent):  Temp: 98.4 °F (36.9 °C) (03/29/23 0433)  Pulse: 65 (03/29/23 0433)  Resp: 18 (03/28/23 2343)  BP: (!) 179/81 (03/29/23 0433)  SpO2: 95 % (03/29/23 0433)   Vital Signs (24h Range):  Temp:  [97.6 °F (36.4 °C)-98.5 °F (36.9 °C)] 98.4 °F (36.9 °C)  Pulse:  [63-71] 65  Resp:  [16-18] 18  SpO2:  [88 %-95 %] 95 %  BP: (139-184)/(63-83) 179/81         Physical Exam  Constitutional:       General: He is not in acute distress.  Cardiovascular:      Rate and Rhythm: Normal rate.      Comments: L 2+ radial pulse  Pulmonary:      Effort: Pulmonary effort is normal. No respiratory distress.      Comments: 3L NC  Skin:     Comments: Left AVF, palpable thrill. Two site of bleeding. No skin erosion noted   Pain to palpation   Aneurysm measures roughly 5cm   Neurological:      General: No focal deficit present.      Mental Status: He is alert.       Significant Labs:  CBC:   Recent Labs   Lab 03/29/23  0232   WBC 4.77   RBC 2.28*   HGB 6.6*   HCT 21.0*      MCV 92   MCH 28.9   MCHC 31.4*     CMP:   Recent Labs   Lab 03/29/23  7622   GLU  161*   CALCIUM 7.8*   ALBUMIN 2.7*   PROT 5.8*   *   K 4.8   CO2 20*      BUN 57*   CREATININE 5.1*   ALKPHOS 176*   ALT 40   AST 45*   BILITOT 0.6       Significant Diagnostics:  I have reviewed all pertinent imaging results/findings within the past 24 hours.

## 2023-03-29 NOTE — PLAN OF CARE
PAWAN spoke to [place] to inform of patients pending DC today. Ethan stated that patient will need NH orders and COVID nasal swab. COVID nasal swab negative.  Patient to receive 1 unit of PRBC and a post transfusion CBC.  NH orders and COVID results uploaded to [facility].    1445  Spoke to Dr. Eliecer ferrell and stated the patient can be discharged and its okay to set up transportation.      1228  CM spoke to [place] from [place] and patient is assigned to room [address] and nurse to call report to [phone].  Transportation arranged for 4:30 PM      Blanca Brown RN  Case Management  [facility]  [phone]

## 2023-03-29 NOTE — ASSESSMENT & PLAN NOTE
Cosme Lewis 58 yo male ESDR on HD (M/W/F) with L AV fistula (s/p retrograde radial stick and balloon angioplasty of fistula 2018), HTN, DM, COPD and HFpEF.  Pain and AVF aneurysm seen on US.     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  Adequate flow rates on UA. No issues with HD access 3/27  - do not access fistula at aneurysmal component  - AVF aneurysm appears stable from US on 3/13  - recommend outpatient follow up in Dr. Rojo in clinic for continued evaluation of AVF aneurysm  - vascular surgery to sign off

## 2023-03-29 NOTE — PROGRESS NOTES
Dialysis completed. Needles removed from UMA fistula with pressure held to sites for 7 minutes each with hemostasis achieved. Gauze dressing applied. Patient dialyzed for 3.5 hours with fluid removal of 2 liters. Tolerated well with stable vital signs. Patient received one unit of blood during his dialysis. Tolerated well. Patient returned to his room via bed.

## 2023-03-29 NOTE — PLAN OF CARE
Client is alert and oriented x 3. Rr even and unlabored. No complaints of pain. No significant changes noted.     Problem: Device-Related Complication Risk (Hemodialysis)  Goal: Safe, Effective Therapy Delivery  Outcome: Ongoing, Progressing     Problem: Hemodynamic Instability (Hemodialysis)  Goal: Effective Tissue Perfusion  Outcome: Ongoing, Progressing     Problem: Infection (Hemodialysis)  Goal: Absence of Infection Signs and Symptoms  Outcome: Ongoing, Progressing     Problem: Adult Inpatient Plan of Care  Goal: Plan of Care Review  Outcome: Ongoing, Progressing  Goal: Patient-Specific Goal (Individualized)  Outcome: Ongoing, Progressing  Goal: Absence of Hospital-Acquired Illness or Injury  Outcome: Ongoing, Progressing  Goal: Optimal Comfort and Wellbeing  Outcome: Ongoing, Progressing  Goal: Readiness for Transition of Care  Outcome: Ongoing, Progressing     Problem: Diabetes Comorbidity  Goal: Blood Glucose Level Within Targeted Range  Outcome: Ongoing, Progressing     Problem: Skin Injury Risk Increased  Goal: Skin Health and Integrity  Outcome: Ongoing, Progressing     Problem: Fall Injury Risk  Goal: Absence of Fall and Fall-Related Injury  Outcome: Ongoing, Progressing     Problem: Impaired Wound Healing  Goal: Optimal Wound Healing  Outcome: Ongoing, Progressing     Problem: Electrolyte Imbalance (Chronic Kidney Disease)  Goal: Electrolyte Balance  Outcome: Ongoing, Progressing     Problem: Fluid Volume Excess (Chronic Kidney Disease)  Goal: Fluid Balance  Outcome: Ongoing, Progressing

## 2023-03-29 NOTE — PROGRESS NOTES
[facility] ([place] [address])  Nephrology  Progress Note    Patient Name: Cosme Lewis  MRN: [id number]  Admission Date: 3/22/2023  Hospital Length of Stay: 7 days  Attending Provider: Hal Prado MD   Primary Care Physician: Primary Doctor No  Principal Problem:Hypertensive emergency    Subjective:     Interval History: Seen on HD today, tolerating well.     Review of patient's allergies indicates:  No Known Allergies  Current Facility-Administered Medications   Medication Frequency    0.9%  NaCl infusion PRN    [START ON 3/29/2023] 0.9%  NaCl infusion Once    albuterol-ipratropium 2.5 mg-0.5 mg/3 mL nebulizer solution 3 mL Q4H PRN    apixaban tablet 5 mg BID    aspirin EC tablet 81 mg Daily    atorvastatin tablet 40 mg Daily    carvediloL tablet 6.25 mg BID    dextrose 10% bolus 125 mL 125 mL PRN    dextrose 10% bolus 250 mL 250 mL PRN    [START ON 3/29/2023] epoetin xavier injection 4,000 Units Every Mon, Wed, Fri    FLUoxetine capsule 40 mg Daily    fluticasone furoate-vilanteroL 100-25 mcg/dose diskus inhaler 1 puff Daily    glucagon (human recombinant) injection 1 mg PRN    hydrALAZINE injection 10 mg Q8H PRN    hydrALAZINE tablet 25 mg Q8H PRN    insulin aspart U-100 pen 0-5 Units QID (AC + HS) PRN    insulin aspart U-100 pen 6 Units TIDWM    insulin detemir U-100 (Levemir) pen 8 Units BID    isosorbide mononitrate 24 hr tablet 30 mg Daily    lisinopriL tablet 40 mg QHS    melatonin tablet 6 mg Nightly PRN    NIFEdipine 24 hr tablet 90 mg Daily    sodium chloride 0.9% flush 10 mL PRN    tiotropium bromide 2.5 mcg/actuation inhaler 2 puff Daily       Objective:     Vital Signs (Most Recent):  Temp: 97.6 °F (36.4 °C) (03/28/23 1225)  Pulse: 64 (03/28/23 1225)  Resp: 18 (03/28/23 1225)  BP: (!) 143/64 (03/28/23 1225)  SpO2: (!) 90 % (03/28/23 1225)   Vital Signs (24h Range):  Temp:  [97.6 °F (36.4 °C)-98.7 °F (37.1 °C)] 97.6 °F (36.4 °C)  Pulse:  [64-78] 64  Resp:  [16-19]  18  SpO2:  [90 %-100 %] 90 %  BP: (141-183)/(64-87) 143/64     Weight: 70.3 kg (154 lb 15.7 oz) (03/24/23 1203)  Body mass index is 22.89 kg/m².  Body surface area is 1.85 meters squared.    I/O last 3 completed shifts:  In: 890 [P.O.:240; I.V.:350; Other:300]  Out: 3150 [Other:3150]    Physical Exam  Vitals and nursing note reviewed.   Constitutional:       General: He is awake. He is not in acute distress.     Appearance: He is ill-appearing. He is not diaphoretic.      Interventions: Nasal cannula in place.   HENT:      Head: Normocephalic and atraumatic.      Right Ear: External ear normal.      Left Ear: External ear normal.      Nose: Nose normal.      Mouth/Throat:      Mouth: Mucous membranes are dry.      Pharynx: Oropharynx is clear. No oropharyngeal exudate or posterior oropharyngeal erythema.      Comments: Poor dentition.  Eyes:      General: No scleral icterus.        Right eye: No discharge.         Left eye: No discharge.      Extraocular Movements: Extraocular movements intact.      Conjunctiva/sclera: Conjunctivae normal.      Comments: Left conjunctival hemorrhage noted.    Cardiovascular:      Rate and Rhythm: Normal rate.      Heart sounds: Murmur heard.   Systolic murmur is present with a grade of 1/6.     No friction rub. No gallop.      Arteriovenous access: Left arteriovenous access is present.     Comments: Left upper extremity AVF with palpable thrill and audible bruit.  Pulmonary:      Effort: Pulmonary effort is normal. No respiratory distress.      Breath sounds: No wheezing, rhonchi or rales.      Comments: 3L NC  Abdominal:      General: Bowel sounds are normal. There is no distension.      Palpations: Abdomen is soft.      Tenderness: There is no abdominal tenderness.   Musculoskeletal:      Cervical back: Neck supple.      Right lower leg: No edema.      Left lower leg: No edema.      Comments: Left AVF, palpable thrill     Skin:     General: Skin is warm and dry.      Coloration:  Skin is not jaundiced.      Findings: Bruising present.   Neurological:      General: No focal deficit present.      Mental Status: He is alert. Mental status is at baseline.      Cranial Nerves: No cranial nerve deficit.      Comments: Grossly hard of hearing.   Psychiatric:         Mood and Affect: Mood normal.         Behavior: Behavior normal. Behavior is cooperative.       Significant Labs:  CBC:   Recent Labs   Lab 03/28/23 0224   WBC 4.48   RBC 2.38*   HGB 7.0*   HCT 22.7*      MCV 95   MCH 29.4   MCHC 30.8*     CMP:   Recent Labs   Lab 03/28/23 0224   *   CALCIUM 7.8*   ALBUMIN 2.6*   PROT 5.6*      K 4.4   CO2 22*      BUN 40*   CREATININE 3.4*   ALKPHOS 166*   ALT 40   AST 50*   BILITOT 0.6     All labs within the past 24 hours have been reviewed.         Assessment/Plan:     Cardiac/Vascular  * Hypertensive emergency  -Improved, weaned off Cardene gtt  -continue Carvedilol 6.25 mg BID; hydralazine 100 mg q 8 hours; nifedipine 90 mg QD, Lisinopril 20 QD  -hold lisinopril until after HD session on dialysis days    Renal/  Chronic kidney disease-mineral and bone disorder    -No indication for phos binders at this time   -Trend phos levels with daily labs     ESRD (end stage renal disease)  Admitted for Hypertensive urgency and hyperglycemia. Chest x ray with bilateral edema. Echo on 3/20 EF 55%, CVP 15    Outpatient HD Information:     -Outpatient HD unit: [facility]   -HD tx days: MWF   -HD tx time: 4hrs   -Last HD tx prior to presentation: 3/20/23  -HD access: LUE AVF   -HD modality: iHD   -Residual urine: Anuric      3/22:  HD completed, net UF of 4L.     Plan/Recommendations:  -UMA AVF with aneurysm. Vascular following, ok to continue to use per vascular, recommends not accessing  fistula at aneurysmal component and continued evaluation of AVF aneurysm as outpatient.  -continue MWF schedule as outpatient   -Strict I/O's and daily weights  -Daily renal function panels   -Renally  dose meds    Oncology  Anemia in ESRD (end-stage renal disease)  - Hgb goal 10-11  - continue epo with HD         Thank you for your consult. I will follow-up with patient. Please contact us if you have any additional questions.    Citlalli Simpson DNP  Nephrology  [facility] ([place]

## 2023-03-30 NOTE — PLAN OF CARE
[facility] ([facility])  Discharge Final Note    Primary Care Provider: Primary Doctor No    Expected Discharge Date: 3/29/2023    Final Discharge Note (most recent)       Final Note - 03/27/23 1223          Final Note    Assessment Type Discharge Planning Reassessment     Hospital Resources/Appts/Education Provided Appointments scheduled and added to AVS        Post-Acute Status    Post-Acute Authorization --   NH return    Discharge Delays None known at this time                     Important Message from Medicare  Important Message from Medicare regarding Discharge Appeal Rights: Explained to patient/caregiver, Signed/date by patient/caregiver     Date IMM was signed: 03/29/23  Time IMM was signed: 1245      Patient discharged to [facility], patient traveled by [facility].      Blanca Brown RN  Case Management  [facility]  [phone]

## 2023-03-31 PROBLEM — N18.6 ESRD ON HEMODIALYSIS: Status: ACTIVE | Noted: 2023-01-01

## 2023-03-31 PROBLEM — Z99.2 ESRD ON HEMODIALYSIS: Status: ACTIVE | Noted: 2023-01-01

## 2023-03-31 NOTE — CONSULTS
[facility] Dept  Critical Care Medicine  Consult Note    Patient Name: Cosme Lewis  MRN: [id number]  Admission Date: 3/31/2023  Hospital Length of Stay: 0 days  Code Status: Prior  Attending Physician: Talia García MD   Primary Care Provider: Primary Doctor No   Principal Problem: DKA    Inpatient consult to Critical Care Medicine  Consult performed by: Lina Maya DO  Consult ordered by: Home Fallon MD        Subjective:     HPI:  Cosme Lewis is a 57 y.o. M with history of ESRD on HD, DM, HTN, COPD who presents with epistaxis. Was recently hospitalized for epistaxis and HTN urgency and discharged 3/29/23, required MICU on cardene gtt. Reports epistaxis never resolved. Denies dizziness or syncope. Denies hematuria, melena, hematochezia. Went to dialysis today and before could be dialyzed, was sent to ED due to epistaxis. On arrival, glu on istat >700. Other labs pending. Reports eating candies, possibly including two bags of skittles today. Denies missing doses of insulin or oral antihyperglycemics as he lives at nursing home and they administer all of his medications regularly. Hgb 8.6, up from 6.6 two days ago. Hemodynamically stable. SpO2 90% on 5 L O2. Does have COPD. CXR reveals pulmonary edema. Plan for dialysis today. MICU consulted for DKA and ESRD on HD.      Hospital/ICU Course:  No notes on file    Past Medical History:   Diagnosis Date    Chronic kidney disease-mineral and bone disorder 10/12/2022    COPD (chronic obstructive pulmonary disease)     Diabetes mellitus type 1     ESRD on dialysis     Hypertension     Hypervolemia 2/22/2023    Hyponatremia 3/4/2023    SOB (shortness of breath) 10/12/2022    Patient with history COPD treated with Ellipta the albuterol, fluticasone-salmeterol tachypneic in the ED saturating 94% and 6 L on nasal cannula, patient does not know how many  he uses it is in nursing home.    -duo nebs Q 4  Given that patient is a poor historian, and in the setting of  left lower extremity edema and history of coagulopathy PE cannot be ruled out.  Will workup for possible infec       Past Surgical History:   Procedure Laterality Date    AV FISTULA PLACEMENT         Review of patient's allergies indicates:  No Known Allergies    Family History       Problem Relation (Age of Onset)    Diabetes Mother          Tobacco Use    Smoking status: Never    Smokeless tobacco: Never   Substance and Sexual Activity    Alcohol use: Not Currently    Drug use: Not on file    Sexual activity: Not Currently      Review of Systems   Constitutional:  Negative for chills and fever.   HENT:  Positive for nosebleeds. Negative for sore throat.    Respiratory:  Negative for cough and shortness of breath.    Cardiovascular:  Negative for chest pain and leg swelling.   Gastrointestinal:  Negative for abdominal pain, constipation, diarrhea, nausea and vomiting.   Genitourinary:  Negative for dysuria.   Musculoskeletal:  Negative for myalgias.   Skin:  Negative for rash.   Neurological:  Negative for dizziness and headaches.   Psychiatric/Behavioral:  Negative for confusion.    Objective:     Vital Signs (Most Recent):  Temp: 97.6 °F (36.4 °C) (03/31/23 1131)  Pulse: 78 (03/31/23 1124)  Resp: 18 (03/31/23 1124)  BP: 128/63 (03/31/23 1131)  SpO2: 100 % (03/31/23 1124) Vital Signs (24h Range):  Temp:  [97.6 °F (36.4 °C)] 97.6 °F (36.4 °C)  Pulse:  [78] 78  Resp:  [18] 18  SpO2:  [100 %] 100 %  BP: (128-138)/(63-90) 128/63   Weight: 76.2 kg (168 lb)  Body mass index is 24.81 kg/m².    No intake or output data in the 24 hours ending 03/31/23 1359    Physical Exam  Constitutional:       General: He is not in acute distress.     Appearance: He is ill-appearing.   HENT:      Head: Normocephalic and atraumatic.      Nose:      Comments: Epistaxis     Mouth/Throat:      Mouth: Mucous membranes are moist.      Comments: Scant blood noted on oropharynx  Eyes:      Extraocular Movements: Extraocular movements intact.       Pupils: Pupils are equal, round, and reactive to light.   Cardiovascular:      Rate and Rhythm: Normal rate and regular rhythm.      Pulses: Normal pulses.      Heart sounds: Normal heart sounds.   Pulmonary:      Effort: Pulmonary effort is normal. No respiratory distress.      Breath sounds: Rales present.   Abdominal:      General: Abdomen is flat. Bowel sounds are normal.      Palpations: Abdomen is soft.   Musculoskeletal:         General: No swelling.      Cervical back: Neck supple.   Skin:     General: Skin is warm and dry.      Capillary Refill: Capillary refill takes less than 2 seconds.   Neurological:      General: No focal deficit present.      Mental Status: He is alert and oriented to person, place, and time. Mental status is at baseline.   Psychiatric:         Mood and Affect: Mood normal.         Behavior: Behavior normal.         Thought Content: Thought content normal.         Judgment: Judgment normal.       Vents:     Lines/Drains/Airways       Drain  Duration                  Hemodialysis AV Fistula Left upper arm -- days              Peripheral Intravenous Line  Duration                  Peripheral IV - Single Lumen 03/31/23 1200 20 G Anterior;Right Forearm <1 day         Peripheral IV - Single Lumen 03/31/23 1329 20 G Anterior;Right Hand <1 day                  Significant Labs:    CBC/Anemia Profile:  Recent Labs   Lab 03/31/23  1218   WBC 5.14   HGB 8.6*   HCT 26.6*      MCV 94   RDW 14.9*        Chemistries:  Recent Labs   Lab 03/31/23  1218   *   K 4.7   CL 96   CO2 19*   BUN 73*   CREATININE 5.0*   CALCIUM 8.1*   ALBUMIN 3.1*   PROT 6.7   BILITOT 0.8   ALKPHOS 194*   ALT 39   AST 28       All pertinent labs within the past 24 hours have been reviewed.    Significant Imaging: I have reviewed all pertinent imaging results/findings within the past 24 hours.      ABG  Recent Labs   Lab 03/31/23  1303   PH 7.329*   PO2 56   PCO2 49.9*   HCO3 26.2   BE 0     Assessment/Plan:      Endocrine  Diabetic ketoacidosis without coma  -- DKA without coma  -- Likely 2/2 eating lots of candies, including possibly 2 bags skittles today  -- Adherent with insulin and oral anti-hypoglycemics, as he reports administered by his nursing home  -- Glu ~ 800  -- pH 7.33  -- AG 17  -- CO2 19  -- Also found to have recurrent epistaxis, hgb improved since last to 8.6, hemodynamically stable  -- Pulmonary edema with SpO2 90% on 5 L in s/o COPD on oxygen at NH (unknown amount)    Recommendations:  -- Admit to hospital medicine step down unit  -- Insulin gtt per DKA protocol  -- Dialysis per nephrology and wean O2 as tolerated  -- Monitor hgb and hemodynamics. Consider ENT consult         Critical secondary to Patient has a condition that poses threat to life and bodily function: DKA     Critical care was time spent personally by me on the following activities: development of treatment plan with patient or surrogate and bedside caregivers, discussions with consultants, evaluation of patient's response to treatment, examination of patient, ordering and performing treatments and interventions, ordering and review of laboratory studies, ordering and review of radiographic studies, pulse oximetry, re-evaluation of patient's condition. This critical care time did not overlap with that of any other provider or involve time for any procedures.    Thank you for your consult. I will sign off. Please contact us if you have any additional questions.     Lina Maya, DO  Critical Care Medicine  [facility]

## 2023-03-31 NOTE — ASSESSMENT & PLAN NOTE
Admitted for epistaxis and hyperglycemia. Chest x ray with bilateral edema. Echo on 3/20 EF 55%, CVP 15. Recently discharged 2 days ago for epitaxis and hypertensive urgency requiring Cardene gtt and MICU stay.       Outpatient HD Information:     -Outpatient HD unit: [facility]   -HD tx days: MWF   -HD tx time: 4hrs   -Last HD tx prior to presentation: 3/20/23  -HD access: LUE AVF   -HD modality: iHD   -Residual urine: Anuric        3/27 - UMA AVF with aneurysm. Vascular consulted during most recent hospitalization, ok to continue to use per vascular, recommends not accessing fistula at aneurysmal component and continued evaluation of AVF aneurysm as outpatient.    Plan/Recommendations:    -HD today 3/31 for metabolic clearance and volume management, UF goal 3L  -Strict I/O's and daily weights  -Daily renal function panels   -Renally dose meds

## 2023-03-31 NOTE — ASSESSMENT & PLAN NOTE
-- DKA without coma  -- Likely 2/2 eating lots of candies, including possibly 2 bags skittles today  -- Adherent with insulin and oral anti-hypoglycemics, as he reports administered by his nursing home  -- Glu ~ 800  -- pH 7.33  -- AG 17  -- CO2 19  -- Also found to have recurrent epistaxis, hgb improved since last to 8.6, hemodynamically stable  -- Pulmonary edema with SpO2 90% on 5 L in s/o COPD on oxygen at NH (unknown amount)    Recommendations:  -- Insulin gtt per DKA protocol  -- Dialysis per nephrology and wean O2 as tolerated  -- Monitor hgb and hemodynamics. Consider ENT consult

## 2023-03-31 NOTE — ASSESSMENT & PLAN NOTE
Not currently in exacerbation   On 5L NC at home  No wheezing on exam    Plan:  - Duo-nebs q6hr PRN  - Home LABA/ICS  - Wean supplemental O2 with goal SpO2 88-92%  - Emphasized importance of smoking cessation

## 2023-03-31 NOTE — HPI
Cosme Lewis is a 57 y.o. M with history of ESRD on HD, DM, HTN, COPD who presents with epistaxis. Was recently hospitalized for epistaxis and HTN urgency and discharged 3/29/23, required MICU on cardene gtt. Reports epistaxis never resolved. Denies dizziness or syncope. Denies hematuria, melena, hematochezia. Went to dialysis today and before could be dialyzed, was sent to ED due to epistaxis. On arrival, glu on istat >700. Other labs pending. Reports eating candies, possibly including two bags of skittles today. Denies missing doses of insulin or oral antihyperglycemics as he lives at nursing home and they administer all of his medications regularly. Hgb 8.6, up from 6.6 two days ago. Hemodynamically stable. SpO2 90% on 5 L O2. Does have COPD. CXR reveals pulmonary edema. Plan for dialysis today. MICU consulted for DKA and ESRD on HD.   Waiting private transportation

## 2023-03-31 NOTE — ED NOTES
"Cosme Lewis, a 57 y.o. male presents to the ED w/ complaint of epistaxis x3 wks - pt takes eliquis and sts his bleeding has been intermittent over last 3 wks. Sts he was at dialysis clinic today but prior to rcving dialysis he was sent here r/t nose bleeding and L eye redness. Pt denies CP/SOB - sts he wears unk amount of o2 at home at baseline. Pt denies pain to this RN - stable.     Triage note:  Chief Complaint   Patient presents with    Epistaxis     Arrives via EMS with c/o epistaxis today, was suppose to have dialysis today, hyperglycemic as well BG "high"      Review of patient's allergies indicates:  No Known Allergies  Past Medical History:   Diagnosis Date    Chronic kidney disease-mineral and bone disorder 10/12/2022    COPD (chronic obstructive pulmonary disease)     Diabetes mellitus type 1     ESRD on dialysis     Hypertension     Hypervolemia 2/22/2023    Hyponatremia 3/4/2023    SOB (shortness of breath) 10/12/2022    Patient with history COPD treated with Ellipta the albuterol, fluticasone-salmeterol tachypneic in the ED saturating 94% and 6 L on nasal cannula, patient does not know how many  he uses it is in nursing home.    -duo nebs Q 4  Given that patient is a poor historian, and in the setting of left lower extremity edema and history of coagulopathy PE cannot be ruled out.  Will workup for possible infec      "

## 2023-03-31 NOTE — CONSULTS
[facility] Dept  Nephrology  Consult Note    Patient Name: Cosme Lewis  MRN: [id number]  Admission Date: 3/31/2023  Hospital Length of Stay: 0 days  Attending Provider: Sheila Kaiser*   Primary Care Physician: Primary Doctor No  Principal Problem:<principal problem not specified>    Inpatient consult to Nephrology  Consult performed by: Citlalli Simpson DNP  Consult ordered by: Home Fallon MD  Reason for consult: ESRD on HD        Subjective:     HPI: Cosme Lewis is a 57 y.o. M with history of ESRD on HD, DM, HTN, COPD who presents with epistaxis. Was recently hospitalized for epistaxis and HTN urgency and discharged 3/29/23, required MICU on cardene gtt. Reports epistaxis never resolved. Denies dizziness or syncope. Denies hematuria, melena, hematochezia. Went to dialysis today and before could be dialyzed, was sent to ED due to epistaxis. On arrival, glu on istat >700. Other labs pending. Reports eating candies, possibly including two bags of skittles today. Denies missing doses of insulin or oral antihyperglycemics as he lives at nursing home and they administer all of his medications regularly. Hgb 8.6, up from 6.6 two days ago. Hemodynamically stable. SpO2 90% on 5 L O2. Does have COPD. Pulmonary edema with SpO2 90% on 5 L in s/o COPD on oxygen at NH (unknown amount). Last HD on Wednesday 3/29. Nephrology consulted for ESRD on HD.                 Past Medical History:   Diagnosis Date    Chronic kidney disease-mineral and bone disorder 10/12/2022    COPD (chronic obstructive pulmonary disease)     Diabetes mellitus type 1     ESRD on dialysis     Hypertension     Hypervolemia 2/22/2023    Hyponatremia 3/4/2023    SOB (shortness of breath) 10/12/2022    Patient with history COPD treated with Ellipta the albuterol, fluticasone-salmeterol tachypneic in the ED saturating 94% and 6 L on nasal cannula, patient does not know how many  he uses it is in nursing home.    -duo nebs Q 4  Given  that patient is a poor historian, and in the setting of left lower extremity edema and history of coagulopathy PE cannot be ruled out.  Will workup for possible infec       Past Surgical History:   Procedure Laterality Date    AV FISTULA PLACEMENT         Review of patient's allergies indicates:  No Known Allergies  Current Facility-Administered Medications   Medication Frequency    0.9%  NaCl infusion Once    dextrose 10% bolus 125 mL 125 mL PRN    dextrose 10% bolus 250 mL 250 mL PRN    insulin regular in 0.9 % NaCl 100 unit/100 mL (1 unit/mL) infusion Continuous    sodium chloride 0.9% bolus 250 mL 250 mL ED 1 Time     Current Outpatient Medications   Medication    acetaminophen (TYLENOL) 650 MG TbSR    albuterol (PROVENTIL/VENTOLIN HFA) 90 mcg/actuation inhaler    albuterol-ipratropium (DUO-NEB) 2.5 mg-0.5 mg/3 mL nebulizer solution    apixaban (ELIQUIS) 5 mg Tab    artificial tears (ISOPTO TEARS) 0.5 % ophthalmic solution    aspirin (ECOTRIN) 81 MG EC tablet    atorvastatin (LIPITOR) 40 MG tablet    carvediloL (COREG) 3.125 MG tablet    cetirizine (ZYRTEC) 10 MG tablet    epoetin xavier (PROCRIT) 10,000 unit/mL injection    erythromycin (ROMYCIN) ophthalmic ointment    FLUoxetine 40 MG capsule    fluticasone-salmeterol diskus inhaler 250-50 mcg    GLUCAGON EMERGENCY KIT, HUMAN, 1 mg injection    HYDROPHILIC CREAM TOP    insulin detemir U-100, Levemir, 100 unit/mL (3 mL) SubQ InPn pen    insulin lispro (HUMALOG KWIKPEN INSULIN) 100 unit/mL pen    isosorbide mononitrate (IMDUR) 30 MG 24 hr tablet    lisinopriL (PRINIVIL,ZESTRIL) 40 MG tablet    melatonin 3 mg TbDL    NIFEdipine (PROCARDIA-XL) 60 MG (OSM) 24 hr tablet    nut.tx.imp.renal fxn,lac-reduc (NEPRO CARB STEADY ORAL)    ondansetron (ZOFRAN) 8 MG tablet    sevelamer carbonate (RENVELA) 800 mg Tab    sodium chloride (SALINE NASAL) 0.65 % nasal spray    tiotropium bromide (SPIRIVA RESPIMAT) 2.5 mcg/actuation inhaler     triamcinolone acetonide 0.025% (KENALOG) 0.025 % cream    vitamin renal formula, B-complex-vitamin c-folic acid, (NEPHROCAP) 1 mg Cap    vits A and D-white pet-lanolin ointment    white petrolatum (VASELINE) ointment     Family History       Problem Relation (Age of Onset)    Diabetes Mother          Tobacco Use    Smoking status: Never    Smokeless tobacco: Never   Substance and Sexual Activity    Alcohol use: Not Currently    Drug use: Not on file    Sexual activity: Not Currently     Review of Systems   Constitutional: Negative.    HENT: Negative.     Eyes: Negative.    Respiratory:  Positive for shortness of breath.    Gastrointestinal: Negative.    Endocrine: Negative.    Genitourinary:  Positive for decreased urine volume.   Skin: Negative.    Neurological:  Positive for weakness.   Psychiatric/Behavioral: Negative.     Objective:     Vital Signs (Most Recent):  Temp: 97.6 °F (36.4 °C) (03/31/23 1131)  Pulse: 78 (03/31/23 1124)  Resp: 18 (03/31/23 1124)  BP: 128/63 (03/31/23 1131)  SpO2: 100 % (03/31/23 1124) Vital Signs (24h Range):  Temp:  [97.6 °F (36.4 °C)] 97.6 °F (36.4 °C)  Pulse:  [78] 78  Resp:  [18] 18  SpO2:  [100 %] 100 %  BP: (128-138)/(63-90) 128/63     Weight: 76.2 kg (168 lb) (03/31/23 1230)  Body mass index is 24.81 kg/m².  Body surface area is 1.93 meters squared.    No intake/output data recorded.    Physical Exam  Vitals and nursing note reviewed.   Constitutional:       General: He is not in acute distress.     Appearance: He is ill-appearing.   HENT:      Head: Normocephalic and atraumatic.      Nose:      Comments: Epistaxis     Mouth/Throat:      Mouth: Mucous membranes are moist.      Comments: Scant blood noted on oropharynx  Eyes:      Conjunctiva/sclera: Conjunctivae normal.   Cardiovascular:      Rate and Rhythm: Normal rate and regular rhythm.      Pulses: Normal pulses.      Heart sounds: Normal heart sounds.      Arteriovenous access: Left arteriovenous access is  present.     Comments: UMA AVF +thrill   Pulmonary:      Effort: Pulmonary effort is normal. No respiratory distress.   Abdominal:      Palpations: Abdomen is soft.   Musculoskeletal:         General: No swelling.      Cervical back: Neck supple.   Skin:     General: Skin is warm and dry.      Capillary Refill: Capillary refill takes less than 2 seconds.   Neurological:      Mental Status: He is alert and oriented to person, place, and time.   Psychiatric:         Mood and Affect: Mood normal.       Significant Labs:  CBC:   Recent Labs   Lab 03/31/23  1218 03/31/23  1219   WBC 5.14  --    RBC 2.82*  --    HGB 8.6*  --    HCT 26.6* 27*     --    MCV 94  --    MCH 30.5  --    MCHC 32.3  --      CMP:   Recent Labs   Lab 03/31/23  1218   *   CALCIUM 8.1*   ALBUMIN 3.1*   PROT 6.7   *   K 4.7   CO2 19*   CL 96   BUN 73*   CREATININE 5.0*   ALKPHOS 194*   ALT 39   AST 28   BILITOT 0.8     All labs within the past 24 hours have been reviewed.        Assessment/Plan:     Renal/  ESRD on hemodialysis  Admitted for epistaxis and hyperglycemia. Chest x ray with bilateral edema. Echo on 3/20 EF 55%, CVP 15. Recently discharged 2 days ago for epitaxis and hypertensive urgency requiring Cardene gtt and MICU stay.       Outpatient HD Information:     -Outpatient HD unit: [facility]   -HD tx days: MWF   -HD tx time: 4hrs   -Last HD tx prior to presentation: 3/20/23  -HD access: LUE AVF   -HD modality: iHD   -Residual urine: Anuric        3/27 - UMA AVF with aneurysm. Vascular consulted during most recent hospitalization, ok to continue to use per vascular, recommends not accessing fistula at aneurysmal component and continued evaluation of AVF aneurysm as outpatient.    Plan/Recommendations:    -HD today 3/31 for metabolic clearance and volume management, UF goal 3L  -Strict I/O's and daily weights  -Daily renal function panels   -Renally dose meds    Chronic kidney disease-mineral and bone disorder  -continue  sevelamer 800 TID meals     Oncology  Anemia in ESRD (end-stage renal disease)  -hgb goal 10-11  epo with HD     Endocrine  Diabetic ketoacidosis without coma  -management per primary         Thank you for your consult. I will follow-up with patient. Please contact us if you have any additional questions.    Citlalli Simpson DNP  Nephrology  [facility]

## 2023-03-31 NOTE — ED NOTES
Pt 1HR BGL check resulted on [employer] chem8 as >700 - stat serum glucose sent at this time for titration result

## 2023-03-31 NOTE — PROVIDER PROGRESS NOTES - EMERGENCY DEPT.
Encounter Date: 3/31/2023    ED Physician Progress Notes          ED Resident HAND-OFF NOTE:  1:57 PM 3/31/2023  Cosme Lewis is a 57 y.o. male who presented to the ED on 3/31/2023 and has been managed by Dr. Fallon, who reports patient C/O epistaxis. I assumed care of patient from off-going ED physician team at 1:57 PM pending labs and admission.      On my exam, I appreciate:  /63   Pulse 78   Temp 97.6 °F (36.4 °C) (Oral)   Resp 18   Wt 76.2 kg (168 lb)   SpO2 100%   BMI 24.81 kg/m²         Disposition: I anticipate patient will be admitted.   I have discussed and counseled Cosme Lewis regarding exam, results, diagnosis, treatment, and plan.  ______________________  Saturnino Andrews MD   Emergency Medicine Resident  1:57 PM 3/31/2023      UPDATE:   I discussed the case with  who will admit the patient to their service. Remains hemodynamically stable and agrees with the plan.       Clinical Impression  :  ESRD (end stage renal disease) (Primary)  Epistaxis  Diabetic ketoacidosis without coma associated with other specified diabetes mellitus

## 2023-03-31 NOTE — SUBJECTIVE & OBJECTIVE
Past Medical History:   Diagnosis Date    Chronic kidney disease-mineral and bone disorder 10/12/2022    COPD (chronic obstructive pulmonary disease)     Diabetes mellitus type 1     ESRD on dialysis     Hypertension     Hypervolemia 2/22/2023    Hyponatremia 3/4/2023    SOB (shortness of breath) 10/12/2022    Patient with history COPD treated with Ellipta the albuterol, fluticasone-salmeterol tachypneic in the ED saturating 94% and 6 L on nasal cannula, patient does not know how many  he uses it is in nursing home.    -duo nebs Q 4  Given that patient is a poor historian, and in the setting of left lower extremity edema and history of coagulopathy PE cannot be ruled out.  Will workup for possible infec       Past Surgical History:   Procedure Laterality Date    AV FISTULA PLACEMENT         Review of patient's allergies indicates:  No Known Allergies    Family History       Problem Relation (Age of Onset)    Diabetes Mother          Tobacco Use    Smoking status: Never    Smokeless tobacco: Never   Substance and Sexual Activity    Alcohol use: Not Currently    Drug use: Not on file    Sexual activity: Not Currently      Review of Systems   Constitutional:  Negative for chills and fever.   HENT:  Positive for nosebleeds. Negative for sore throat.    Respiratory:  Negative for cough and shortness of breath.    Cardiovascular:  Negative for chest pain and leg swelling.   Gastrointestinal:  Negative for abdominal pain, constipation, diarrhea, nausea and vomiting.   Genitourinary:  Negative for dysuria.   Musculoskeletal:  Negative for myalgias.   Skin:  Negative for rash.   Neurological:  Negative for dizziness and headaches.   Psychiatric/Behavioral:  Negative for confusion.    Objective:     Vital Signs (Most Recent):  Temp: 97.6 °F (36.4 °C) (03/31/23 1131)  Pulse: 78 (03/31/23 1124)  Resp: 18 (03/31/23 1124)  BP: 128/63 (03/31/23 1131)  SpO2: 100 % (03/31/23 1124) Vital Signs (24h Range):  Temp:  [97.6 °F (36.4 °C)]  97.6 °F (36.4 °C)  Pulse:  [78] 78  Resp:  [18] 18  SpO2:  [100 %] 100 %  BP: (128-138)/(63-90) 128/63   Weight: 76.2 kg (168 lb)  Body mass index is 24.81 kg/m².    No intake or output data in the 24 hours ending 03/31/23 1359    Physical Exam  Constitutional:       General: He is not in acute distress.     Appearance: He is ill-appearing.   HENT:      Head: Normocephalic and atraumatic.      Nose:      Comments: Epistaxis     Mouth/Throat:      Mouth: Mucous membranes are moist.      Comments: Scant blood noted on oropharynx  Eyes:      Extraocular Movements: Extraocular movements intact.      Pupils: Pupils are equal, round, and reactive to light.   Cardiovascular:      Rate and Rhythm: Normal rate and regular rhythm.      Pulses: Normal pulses.      Heart sounds: Normal heart sounds.   Pulmonary:      Effort: Pulmonary effort is normal. No respiratory distress.      Breath sounds: Rales present.   Abdominal:      General: Abdomen is flat. Bowel sounds are normal.      Palpations: Abdomen is soft.   Musculoskeletal:         General: No swelling.      Cervical back: Neck supple.   Skin:     General: Skin is warm and dry.      Capillary Refill: Capillary refill takes less than 2 seconds.   Neurological:      General: No focal deficit present.      Mental Status: He is alert and oriented to person, place, and time. Mental status is at baseline.   Psychiatric:         Mood and Affect: Mood normal.         Behavior: Behavior normal.         Thought Content: Thought content normal.         Judgment: Judgment normal.       Vents:     Lines/Drains/Airways       Drain  Duration                  Hemodialysis AV Fistula Left upper arm -- days              Peripheral Intravenous Line  Duration                  Peripheral IV - Single Lumen 03/31/23 1200 20 G Anterior;Right Forearm <1 day         Peripheral IV - Single Lumen 03/31/23 1329 20 G Anterior;Right Hand <1 day                  Significant Labs:    CBC/Anemia  Profile:  Recent Labs   Lab 03/31/23  1218   WBC 5.14   HGB 8.6*   HCT 26.6*      MCV 94   RDW 14.9*        Chemistries:  Recent Labs   Lab 03/31/23  1218   *   K 4.7   CL 96   CO2 19*   BUN 73*   CREATININE 5.0*   CALCIUM 8.1*   ALBUMIN 3.1*   PROT 6.7   BILITOT 0.8   ALKPHOS 194*   ALT 39   AST 28       All pertinent labs within the past 24 hours have been reviewed.    Significant Imaging: I have reviewed all pertinent imaging results/findings within the past 24 hours.

## 2023-03-31 NOTE — ASSESSMENT & PLAN NOTE
NYHA II-III  acutely decompensated    Results for orders placed during the hospital encounter of 03/16/23    Echo    Interpretation Summary  · The left ventricle is normal in size with normal systolic function.  · The estimated ejection fraction is 55%.  · Grade II left ventricular diastolic dysfunction.  · Moderate right ventricular enlargement with moderately reduced right ventricular systolic function.  · Biatrial enlargement.  · Interatrial septum bows to left, consider elevated right atrial pressure.  · Mild tricuspid regurgitation.  · The estimated PA systolic pressure is 54 mmHg.  · There is pulmonary hypertension.  · Elevated central venous pressure (15 mmHg).  · Small circumferential pericardial effusion.  · There are bilateral pleural effusions.  · There is ascites present.       Home GDMT/Diuretic:  - Beta Blocker: Carvedilol 3.125  - SGLT2: Contraindicated  - Diuretic: Anuric on iHD    CXR BL edema       Plan:  - Volume removal via HD   - SGLT2i not appropriate 2/2 DKA and renal failure  - Daily weights (standing if tolerated)  - Strict I/Os  - Fluid restriction at 1500mL  - Cardiac diet  - Maintain on telemetry and daily EKGs, maintain Mag >2 & K+ >4

## 2023-03-31 NOTE — ASSESSMENT & PLAN NOTE
Patient with Hypoxic Respiratory failure which is Acute on chronic.  he is on home oxygen at 5 LPM.  Signs/symptoms of respiratory failure include- tachypnea and increased work of breathing. Contributing diagnoses includes - COPD Labs and images were reviewed. Patient Has recent ABG, which has been reviewed. Will treat underlying causes and adjust management of respiratory failure as follows:  Chest xray with appearance of volume overload; rales on exam.     --Nephrology consulted for HD; volume removal  --BP control; Cardene drip  --Wean FiO2 for SpO2>88%, now on room air  --Home inhalers; PRN nebs

## 2023-03-31 NOTE — ED NOTES
iSTAT Chem 8    Na+ 134   K+ 3.9   Cl 107   iCa 1.00   TCO2 23   Glu 672   BUN 66   Creat 5.0   Hct% 25      Pt  - will follow nomogram and keep pt at 0.2u/kg/hr until he reaches therapeutic range of <200.

## 2023-03-31 NOTE — HPI
Cosme Lewis is a 57 y.o. M with history of ESRD on HD, DM, HTN, COPD who presents with epistaxis. Was recently hospitalized for epistaxis and HTN urgency and discharged 3/29/23, required MICU on cardene gtt. Reports epistaxis never resolved. Denies dizziness or syncope. Denies hematuria, melena, hematochezia. Went to dialysis today and before could be dialyzed, was sent to ED due to epistaxis. On arrival, glu on istat >700. Other labs pending. Reports eating candies, possibly including two bags of skittles today. Denies missing doses of insulin or oral antihyperglycemics as he lives at nursing home and they administer all of his medications regularly. Hgb 8.6, up from 6.6 two days ago. Hemodynamically stable. SpO2 90% on 5 L O2. Does have COPD. Pulmonary edema with SpO2 90% on 5 L in s/o COPD on oxygen at NH (unknown amount). Last HD on Wednesday 3/29. Nephrology consulted for ESRD on HD.

## 2023-03-31 NOTE — PHARMACY MED REC
"Admission Medication History     The home medication history was taken by Christine Fabian.    You may go to "Admission" then "Reconcile Home Medications" tabs to review and/or act upon these items.     The home medication list has been updated by the Pharmacy department.   Please read ALL comments highlighted in yellow.   Please address this information as you see fit.    Feel free to contact us if you have any questions or require assistance.        Medications listed below were obtained from: Nursing home      Current Outpatient Medications on File Prior to Encounter   Medication Sig    acetaminophen (TYLENOL) 650 MG TbSR Take 650 mg by mouth every 8 (eight) hours as needed (pain or fever over 100.4).    albuterol (PROVENTIL/VENTOLIN HFA) 90 mcg/actuation inhaler Inhale 2 puffs into the lungs 4 (four) times daily as needed (breathing).    albuterol-ipratropium (DUO-NEB) 2.5 mg-0.5 mg/3 mL nebulizer solution Take 3 mLs by nebulization every 4 (four) hours while awake. Rescue    apixaban (ELIQUIS) 5 mg Tab Take 5 mg by mouth 2 (two) times daily.    artificial tears (ISOPTO TEARS) 0.5 % ophthalmic solution Place 1 drop into both eyes 6 (six) times daily.    aspirin (ECOTRIN) 81 MG EC tablet Take 81 mg by mouth once daily.    atorvastatin (LIPITOR) 40 MG tablet Take 40 mg by mouth every evening.    carvediloL (COREG) 3.125 MG tablet Take 2 tablets (6.25 mg total) by mouth 2 (two) times daily.    cetirizine (ZYRTEC) 10 MG tablet Take 10 mg by mouth daily as needed (itching).    epoetin xavier (PROCRIT) 10,000 unit/mL injection Inject 0.7 mLs (7,000 Units total) into the skin every Tues, Thurs, Sat.    erythromycin (ROMYCIN) ophthalmic ointment Place a 1/2 inch ribbon of ointment into the lower right eyelid three times a day.    FLUoxetine 40 MG capsule Take 40 mg by mouth once daily.    fluticasone-salmeterol diskus inhaler 250-50 mcg Inhale 1 puff into the lungs 2 (two) times daily.    insulin detemir U-100, Levemir, 100 " unit/mL (3 mL) SubQ InPn pen Inject 8 Units into the skin 2 (two) times daily.    insulin lispro (HUMALOG KWIKPEN INSULIN) 100 unit/mL pen Inject 8 Units into the skin 3 (three) times daily before meals. And before meals & at bedtime per sliding scale    isosorbide mononitrate (IMDUR) 30 MG 24 hr tablet Take 1 tablet (30 mg total) by mouth once daily.    lisinopriL (PRINIVIL,ZESTRIL) 40 MG tablet Take 1 tablet (40 mg total) by mouth every evening.    melatonin 3 mg TbDL Take 9 mg by mouth nightly as needed (sleep).    NIFEdipine (PROCARDIA-XL) 60 MG (OSM) 24 hr tablet Take 1 tablet (60 mg total) by mouth 2 (two) times a day.    ondansetron (ZOFRAN) 8 MG tablet Take 1 tablet by mouth 3 (three) times daily as needed for Nausea.    sevelamer carbonate (RENVELA) 800 mg Tab Take 800 mg by mouth 3 (three) times daily.    sodium chloride (SALINE NASAL) 0.65 % nasal spray 1 spray by Nasal route as needed (dry nostril).    tiotropium bromide (SPIRIVA RESPIMAT) 2.5 mcg/actuation inhaler Inhale 2 puffs into the lungs Daily.    triamcinolone acetonide 0.025% (KENALOG) 0.025 % cream Apply topically 2 (two) times daily.    vitamin renal formula, B-complex-vitamin c-folic acid, (NEPHROCAP) 1 mg Cap Take 1 capsule by mouth once daily.    vits A and D-white pet-lanolin ointment Apply topically 2 (two) times daily. Apply twice daily to penis (Patient taking differently: Apply twice daily to penis)    white petrolatum (VASELINE) ointment Apply topically once daily. Apply small amount to both nostrils daily (Patient taking differently: Apply small amount to both nostrils daily)    GLUCAGON EMERGENCY KIT, HUMAN, 1 mg injection 1 mg into the muscle as needed if CBG < 70    HYDROPHILIC CREAM TOP Apply liberal amount to affected area twice daily for dry skin    nut.tx.imp.renal fxn,lac-reduc (NEPRO CARB STEADY ORAL) Give 1 carton by mouth with each meal.           Potential issues to be addressed PRIOR TO DISCHARGE  The listed medications  were obtained from another facility ([facility]). The patient may not have been able to fill these prescriptions prior to this admission and may require new scripts upon discharge.     Christine Fabian  [id number]                  .

## 2023-03-31 NOTE — ED PROVIDER NOTES
"Encounter date: 11:30 AM 03/31/2023    Source of History   Patient/EMS    Chief Complaint   Pt presents with:   Epistaxis (Arrives via EMS with c/o epistaxis today, was suppose to have dialysis today, hyperglycemic as well BG "high" )      History Of Present Illness   Cosme Lewis is a 57 y.o. male with ESRD on HD on MWF, DM1, HTN, COPD, on Eliquis who presents to the ED with epistaxis and inability to have dialysis.  Patient states that his nose has been bleeding for 3 weeks.  He denies any nasal trauma or falls.  He notes that he has been compliant with his Eliquis.  He states that his last session of dialysis was on Wednesday.  Patient was discharged from the ICU after being admitted from the ED due to recurrent epistaxis, shortness of breath and noted to be hyperglycemic and hypertensive.  He does note some shortness of breath but states this is chronic in his his baseline.  He denies any recent headaches, fevers, chills, chest pain or shortness of breath.  Denies nausea or vomiting.  He states that he is aneuric.  Any denies any focal neurologic deficits.    This is the extent to the patients complaints today here in the emergency department.  Past History   Review of patient's allergies indicates:  No Known Allergies    No current facility-administered medications on file prior to encounter.     Current Outpatient Medications on File Prior to Encounter   Medication Sig Dispense Refill    acetaminophen (TYLENOL) 650 MG TbSR Take 650 mg by mouth every 8 (eight) hours as needed (pain or fever over 100.4).      albuterol (PROVENTIL/VENTOLIN HFA) 90 mcg/actuation inhaler Inhale 2 puffs into the lungs 4 (four) times daily as needed (breathing).      albuterol-ipratropium (DUO-NEB) 2.5 mg-0.5 mg/3 mL nebulizer solution Take 3 mLs by nebulization every 4 (four) hours while awake. Rescue 6480 mL 0    apixaban (ELIQUIS) 5 mg Tab Take 5 mg by mouth 2 (two) times daily.      artificial tears (ISOPTO TEARS) 0.5 % " ophthalmic solution Place 1 drop into both eyes 6 (six) times daily. 15 mL 0    aspirin (ECOTRIN) 81 MG EC tablet Take 81 mg by mouth once daily.      atorvastatin (LIPITOR) 40 MG tablet Take 1 tablet (40 mg total) by mouth once daily. (Patient taking differently: Take 40 mg by mouth every evening.) 90 tablet 3    carvediloL (COREG) 3.125 MG tablet Take 2 tablets (6.25 mg total) by mouth 2 (two) times daily. 120 tablet 11    cetirizine (ZYRTEC) 10 MG tablet Take 10 mg by mouth daily as needed (itching).      epoetin xavier (PROCRIT) 10,000 unit/mL injection Inject 0.7 mLs (7,000 Units total) into the skin every Tues, Thurs, Sat.      erythromycin (ROMYCIN) ophthalmic ointment Place a 1/2 inch ribbon of ointment into the lower eyelid three times a day. (Patient taking differently: Place a 1/2 inch ribbon of ointment into the lower right eyelid three times a day.) 3.5 g 0    FLUoxetine 40 MG capsule Take 40 mg by mouth once daily.      fluticasone-salmeterol diskus inhaler 250-50 mcg Inhale 1 puff into the lungs 2 (two) times daily.      insulin detemir U-100, Levemir, 100 unit/mL (3 mL) SubQ InPn pen Inject 8 Units into the skin 2 (two) times daily. 3 mL 11    insulin lispro (HUMALOG KWIKPEN INSULIN) 100 unit/mL pen Inject 8 Units into the skin 3 (three) times daily before meals. (Patient taking differently: Inject 8 Units into the skin 3 (three) times daily before meals. And before meals & at bedtime per sliding scale) 6 mL 11    isosorbide mononitrate (IMDUR) 30 MG 24 hr tablet Take 1 tablet (30 mg total) by mouth once daily. 90 tablet 3    lisinopriL (PRINIVIL,ZESTRIL) 40 MG tablet Take 1 tablet (40 mg total) by mouth every evening. 90 tablet 3    melatonin 3 mg TbDL Take 9 mg by mouth nightly as needed (sleep).      NIFEdipine (PROCARDIA-XL) 60 MG (OSM) 24 hr tablet Take 1 tablet (60 mg total) by mouth 2 (two) times a day. 60 tablet 11    ondansetron (ZOFRAN) 8 MG tablet Take 1 tablet by mouth 3  (three) times daily as needed for Nausea.      sevelamer carbonate (RENVELA) 800 mg Tab Take 800 mg by mouth 3 (three) times daily.      sodium chloride (SALINE NASAL) 0.65 % nasal spray 1 spray by Nasal route as needed (dry nostril). 30 mL 12    tiotropium bromide (SPIRIVA RESPIMAT) 2.5 mcg/actuation inhaler Inhale 2 puffs into the lungs Daily.      triamcinolone acetonide 0.025% (KENALOG) 0.025 % cream Apply topically 2 (two) times daily.      vitamin renal formula, B-complex-vitamin c-folic acid, (NEPHROCAP) 1 mg Cap Take 1 capsule by mouth once daily.      vits A and D-white pet-lanolin ointment Apply topically 2 (two) times daily. Apply twice daily to penis (Patient taking differently: Apply twice daily to penis) 15 g 0    white petrolatum (VASELINE) ointment Apply topically once daily. Apply small amount to both nostrils daily (Patient taking differently: Apply small amount to both nostrils daily) 5 g 0    GLUCAGON EMERGENCY KIT, HUMAN, 1 mg injection 1 mg into the muscle as needed if CBG < 70      HYDROPHILIC CREAM TOP Apply liberal amount to affected area twice daily for dry skin      nut.tx.imp.renal fxn,lac-reduc (NEPRO CARB STEADY ORAL) Give 1 carton by mouth with each meal.         As per HPI and below:  Past Medical History:   Diagnosis Date    Chronic kidney disease-mineral and bone disorder 10/12/2022    COPD (chronic obstructive pulmonary disease)     Diabetes mellitus type 1     ESRD on dialysis     Hypertension     Hypervolemia 2/22/2023    Hyponatremia 3/4/2023    SOB (shortness of breath) 10/12/2022    Patient with history COPD treated with Ellipta the albuterol, fluticasone-salmeterol tachypneic in the ED saturating 94% and 6 L on nasal cannula, patient does not know how many  he uses it is in nursing home.    -duo nebs Q 4  Given that patient is a poor historian, and in the setting of left lower extremity edema and history of coagulopathy PE cannot be ruled out.  Will workup  for possible infec     Past Surgical History:   Procedure Laterality Date    AV FISTULA PLACEMENT         Social History     Socioeconomic History    Marital status:    Tobacco Use    Smoking status: Never    Smokeless tobacco: Never   Substance and Sexual Activity    Alcohol use: Not Currently    Sexual activity: Not Currently       Family History   Problem Relation Age of Onset    Diabetes Mother        Physical Exam     Vitals:    03/31/23 1124 03/31/23 1131 03/31/23 1230   BP: (!) 138/90 128/63    Pulse: 78     Resp: 18     Temp:  97.6 °F (36.4 °C)    TempSrc:  Oral    SpO2: 100%     Weight:   76.2 kg (168 lb)     Physical Exam:   Nursing note and vitals reviewed.  Appearance:  Chronically ill-appearing adult male in no acute respiratory distress.  Making purposeful movements.  Speaking in full sentences.  Skin: No obvious rashes seen.  Good turgor.  No abrasions.  No ecchymoses.  Dialysis access site in left upper arm with palpable thrill and bruit with no signs of active hemorrhage.   Eyes:  Noted subconjunctival hemorrhage of left eye. EOMI, PERRL.  Head: NC/AT  ENT: Oropharynx clear.  Noted slight epistaxis with slight slow bleeding from left nare.   Chest:  Coarse breath sounds bilaterally. No increased work of breathing, bilateral chest rise.  Cardiovascular: Regular rate and rhythm.  Normal equal bilateral radial pulses.  Abdomen: Soft.  Not distended.  Nontender.  No guarding.  No rebound. No Masses  Musculoskeletal: Good range of motion all joints.  No deformities.  Neck supple, full range of motion, no obvious deformity.  Neurologic: Moves all extremities.  Normal sensation.  No facial droop.  Normal speech.    Mental Status:  Alert and oriented x 3.  Appropriate, conversant.      Results and Medications    Procedures    Labs Reviewed   CBC W/ AUTO DIFFERENTIAL - Abnormal; Notable for the following components:       Result Value    RBC 2.82 (*)     Hemoglobin 8.6 (*)     Hematocrit 26.6  (*)     RDW 14.9 (*)     Lymph # 0.4 (*)     Gran % 83.8 (*)     Lymph % 7.4 (*)     All other components within normal limits   COMPREHENSIVE METABOLIC PANEL - Abnormal; Notable for the following components:    Sodium 132 (*)     CO2 19 (*)     Glucose 872 (*)     BUN 73 (*)     Creatinine 5.0 (*)     Calcium 8.1 (*)     Albumin 3.1 (*)     Alkaline Phosphatase 194 (*)     Anion Gap 17 (*)     eGFR 12.6 (*)     All other components within normal limits   BETA - HYDROXYBUTYRATE, SERUM - Abnormal; Notable for the following components:    Beta-Hydroxybutyrate 1.1 (*)     All other components within normal limits    Narrative:     With next lab draw   BASIC METABOLIC PANEL - Abnormal; Notable for the following components:    Sodium 130 (*)     CO2 20 (*)     Glucose 797 (*)     BUN 66 (*)     Creatinine 4.8 (*)     Calcium 7.7 (*)     eGFR 13.2 (*)     All other components within normal limits    Narrative:     With next lab draw   PHOSPHORUS - Abnormal; Notable for the following components:    Phosphorus 4.6 (*)     All other components within normal limits    Narrative:     With next lab draw   GLUCOSE, RANDOM - Abnormal; Notable for the following components:    Glucose 815 (*)     All other components within normal limits   ISTAT PROCEDURE - Abnormal; Notable for the following components:    POC PH 7.329 (*)     POC PCO2 49.9 (*)     POC SATURATED O2 86 (*)     All other components within normal limits   ISTAT PROCEDURE - Abnormal; Notable for the following components:    POC Glucose >700 (*)     POC BUN 75 (*)     POC Creatinine 4.7 (*)     POC Sodium 130 (*)     POC Chloride 94 (*)     POC Ionized Calcium 1.03 (*)     POC Hematocrit 27 (*)     All other components within normal limits   MAGNESIUM    Narrative:     With next lab draw   URINALYSIS, REFLEX TO URINE CULTURE   OSMOLALITY, SERUM   OSMOLALITY, URINE RANDOM   SARS-COV2 (COVID) WITH FLU/RSV BY PCR   GLUCOSE, RANDOM   ISTAT CHEM8   POCT GLUCOSE MONITORING  CONTINUOUS   POCT GLUCOSE MONITORING CONTINUOUS   POCT GLUCOSE MONITORING CONTINUOUS       Imaging Results              X-Ray Chest 1 View (Final result)  Result time 03/31/23 12:26:55      Final result by Henri Hall Jr., MD (03/31/23 12:26:55)                   Impression:      Findings most consistent with volume overload/congestive heart failure.      Electronically signed by: Henri Hall MD  Date:    03/31/2023  Time:    12:26               Narrative:    EXAMINATION:  XR CHEST 1 VIEW    CLINICAL HISTORY:  End stage renal disease    TECHNIQUE:  Single frontal view of the chest was performed.    COMPARISON:  March 22, 2023    FINDINGS:  Monitoring leads are in place.  Heart is enlarged.  Diffuse increase in the interstitial alveolar markings.  Some linear opacities right lung base likely fluid in the fissure.  No pneumothorax.                                          Medications   insulin regular in 0.9 % NaCl 100 unit/100 mL (1 unit/mL) infusion (0.1 Units/kg/hr × 76.2 kg Intravenous New Bag 3/31/23 1343)   dextrose 10% bolus 125 mL 125 mL (has no administration in time range)   dextrose 10% bolus 250 mL 250 mL (has no administration in time range)   0.9%  NaCl infusion (has no administration in time range)   sodium chloride 0.9% flush 10 mL (has no administration in time range)   carvediloL tablet 6.25 mg (has no administration in time range)   epoetin xavier injection 4,000 Units (has no administration in time range)   FLUoxetine capsule 40 mg (has no administration in time range)   fluticasone furoate-vilanteroL 100-25 mcg/dose diskus inhaler 1 puff (has no administration in time range)   tiotropium bromide 2.5 mcg/actuation inhaler 2 puff (has no administration in time range)   0.9%  NaCl infusion (has no administration in time range)   albuterol-ipratropium 2.5 mg-0.5 mg/3 mL nebulizer solution 3 mL (has no administration in time range)   dextrose 10% bolus 125 mL 125 mL (has no administration in time  range)   dextrose 10% bolus 250 mL 250 mL (has no administration in time range)   glucagon (human recombinant) injection 1 mg (has no administration in time range)   melatonin tablet 6 mg (has no administration in time range)   sodium chloride 0.9% flush 10 mL (has no administration in time range)   atorvastatin tablet 40 mg (has no administration in time range)   sodium chloride 0.9% flush 10 mL (has no administration in time range)   dextrose 5 % and 0.45 % NaCl infusion (has no administration in time range)   dextrose 10% bolus 250 mL 250 mL (has no administration in time range)   dextrose 10 % infusion (has no administration in time range)   dextrose 10% bolus 125 mL 125 mL (has no administration in time range)   dextrose 10 % infusion (has no administration in time range)   oxymetazoline 0.05 % nasal spray 2 spray (2 sprays Each Nostril Given 3/31/23 1226)   sodium chloride 0.9% bolus 250 mL 250 mL (250 mLs Intravenous New Bag 3/31/23 1351)   insulin regular bolus from bag/infusion 7.62 Units 7.62 mL (7.62 Units Intravenous Bolus from Bag 3/31/23 1344)       MDM, Impression and Plan   Previous Records:   I decided to obtain old medical records which is listed here or in ED course:  See HPI in ED course    Initial Assessment:   Urgent evaluation of 57 y.o. male with history of ESRD on HD, type 1 diabetes, hypertension and COPD and recurrent epistaxis who presents the ED with epistaxis and inability to have dialysis today.  He is noted to be on Eliquis.  On arrival to the ED he is noted to be afebrile, hemodynamically stable and in no acute respiratory distress.  Physical exam as above.  Differential Diagnosis:   -HHS as versus HHNK versus DKA   -hyperglycemia   -hyperkalemia   -electrolyte abnormalities  -volume overload   -COVID   -influenza   -anemia  Independently Interpreted Test(s):   EKG:  I independently reviewed and interpreted the EKG and my findings are as follows:   Detailed here or in ED course.  EKG  shows sinus rhythm with first-degree AV block and ventricular rate of 60 beats per minute.  Narrow QRS.  No ST segment elevations depressions.  No STEMI.  No peaked T-waves.  IMAGING:  I have ordered and independently interpreted X-rays and my findings are as follow:  Detailed here or in ED course.  Chest x-ray shows signs of volume overload.    Clinical Tests:   Lab Tests: Ordered and Reviewed  Radiological Study: Ordered and Reviewed  Medical Tests: Ordered and Reviewed    ED Management:    Patient's epistaxis is slight in his hemoglobin is near baseline.  He was provided with Afrin with reduction in his nasal bleeding.  He is noted to be severely hyperglycemic which appears to be consistent with HHNK.  Not DKA.  His potassium was noted to be within normal limits.  His hemoglobin is above recent baseline and he has no leukocytosis.  Chest x-ray concerning for volume overload.  He was given a 250 cc bolus in the ED in a 6 unit bolus of insulin with initiation of an insulin drip.  I called discussed case with nephrology who was agreeable with dialysis.  As patient was recently discharged from the ICU and will require dialysis and fluids for his hyperosmolar state I called and discussed the case with Critical Care Medicine who was agreeable with seeing the patient.  Critical care medicine saw the patient and believe that the patient is stable for step-down to Hospital Medicine.  I then called and discussed case with Hospital Medicine who was agreeable with admission for dialysis, final recommendations per Nephrology and insulin drip for his hyperglycemic hyperosmolar state.  Patient updated on plans for admission.  Patient deemed stable for admission to hospital medicine.  I called and discussed the case with:  Nephrology/hospital medicine/critical care medicine    Please see ED course for discussion of workup and dispo if not listed above.              Final diagnoses:  [N18.6] ESRD (end stage renal  disease)  [R04.0] Epistaxis  [E10.69, E10.65, E87.0] Type 1 diabetes mellitus with hyperosmolar hyperglycemic state (HHS) (Primary)        ED Disposition Condition    Admit Stable               ED Course as of 03/31/23 1542   Fri Mar 31, 2023   1233 Spoke with neprhology who states they will dialyze the patient [HM]   1247 Spoke with Critical Care Medicine who was agreeable to see the patient []   1531 ECHO on 3/20/2023 shows   · The left ventricle is normal in size with normal systolic function.  · The estimated ejection fraction is 55%.  · Grade II left ventricular diastolic dysfunction.  · Moderate right ventricular enlargement with moderately reduced right ventricular systolic function.  · Biatrial enlargement.  · Interatrial septum bows to left, consider elevated right atrial pressure.  · Mild tricuspid regurgitation.  · The estimated PA systolic pressure is 54 mmHg.  · There is pulmonary hypertension.  · Elevated central venous pressure (15 mmHg).  · Small circumferential pericardial effusion.  · There are bilateral pleural effusions.  · There is ascites present.   [HM]      ED Course User Index  [HM] Home Fallon MD           Attending Attestation:   Physician Attestation Statement for Resident:  As the supervising MD   Physician Attestation Statement: I have personally seen and examined this patient.   I agree with the above history.  - with the following exceptions: This is a 58 yo male who presented to the ED today with a c/o epistaxis.   External sources: Visits external to today's visit; EMS report.   The patient was recently admitted (discharged 2 days ago) initially to the MICU for epistaxis, hypertensive urgency, and hyperglycemia (no gap). He is supposed to dialyze M/W/F and has not dialyzed today. He did dialyze on Wednesday though. He arrived to dialysis today and had continued epistaxis and EMS was called. He states this epistaxis just continues to happen intermittently but pretty  consistently over the past three weeks. As above, he is on eliquis.  Seems to be primarily from the left nares.  He is also had problems with subconjunctival hemorrhages bilaterally throughout this period of time.  EMS reports that when they arrived to retrieve the patient, his blood glucose was reading high.  He also had a small bit of blood at the left nare at that time.  No chest pain.  No shortness of breath.  No abdominal pain.  No vomiting or diarrhea.     As the supervising MD I agree with the above PE.   - with the following exceptions: VS upon arrival:  128/63; 78; 18; 100% room air; afebrile.  Consititutional: Pt is awake, alert, and communicative.  A bit of a difficult informant.  Hard of hearing.  Does not appear to be in any magnolia distress but does have a bit of blood emanating from the left nare.   HEENT: PERRL; EOMI; conjunctival hemorrhage and some conjunctival petechiae; left nare with some bleeding that is mild at this time; op clear; a small bit of blood in the posterior op; mmm without lesions.  Neck: Supple with good ROM.  CV: Normal rate; regular rhythm; no mrg. Heart sounds normal. No peripheral edema. 2+ radials bilateral and symmetric. Access in LUE with good bruit and thrill.   Respiratory: CTA bilaterally without rales, ronchi, or wheezes. No respiratory distress at this time.   GI: Abdomen soft, NTND. No rebound. No guarding. BS normal.  MSK: No long bone deformities; no focal joint swellings.  Neuro: CN II-XII intact; normal motor function throughout extremities.   Skin: No skin lesions or rash.       As the supervising MD I agree with the above treatment, course, plan, and disposition.   - with the following exceptions: This patient has a concerning recent history and comorbidities.  He is dialysis dependent.  He has this recent admission to the MICU for hyperglycemia, epistaxis and hypertensive urgency.  Now he is back with very similar symptoms in regards to the epistaxis and was also  unable to dialyze today to to this.  We were able to perform an i-STAT pretty early in his ED stay and found that his glucose was greater than 700 and thus the hyperglycemia is an issue once again.  He does not appear to have a gap on calculation on the istat.  It is 10.  Thus we went ahead early on and initiated treatment with insulin bolus and drip.  His ECG, independently reviewed and interpreted by me, revealed sinus rhythm with some prominent T-waves but no evidence of any widened QRS or dysrhythmia.  His chest x-ray that we ordered, independently reviewed and interpreted by me and interpreted by Radiology, reveals what appears to us as some increased volume overload and thus it is difficult for us to administer a significant amount of volume to the patient for his hyperglycemia.  Thus we started with 250 cc of fluid at the outset. In regard to his epistaxis, we started with afrin. It is a mild nosebleed. No compromise to the airway.     Once his labs started to return, upon my review, the patient has a glucose of 784, a K of 3.7, and a bicarb of 17 with an AG of 17 on the actual CMP. Thus, although the POC looked like no WGMA was present, indeed his CMP reveals one. Regardless we are rendering the appropriate treatment even though there is some conflicting evidence of either gap or not. His WBC is 5.14. His hct is 26.6 which was last 21 on 3/29. His plts were acceptable. His CXR, independently reviewed and interpreted by me and interpreted by radiology, reveals what appears to me to be volume overload. Thus this is a difficult situation - DKA in a patient with significant hyperglycemia who can't afford to take in much more fluid. We are managing primarily with insulin at this time. We will monitor his epistaxis closely although it is very mild. We have consulted and discussed the case with ICU team who is coming down to evaluate the patient to determine if they feel his dispo is best suited for the ICU vs  stepdown. We also have formally consulted nephrology as the patient will need to dialyze and discussed the case with them too. The patient is in guarded condition at this time.   ED Diagnosis:  1. Acute diabetic ketoacidosis.   2. Acute epistaxis.   3. Acute volume overload.   4. The above diagnoses are complicated by patient's known ESRD on chronic hemodialysis, missed dialysis today, and chronic eliquis use for a preceeding diagnosis of PE.    I have reviewed and agree with the residents interpretation of the following: lab data, x-rays and EKG.  I have reviewed the following: old records at this facility.        Attending Critical Care:   Critical Care Times:   Direct Patient Care (initial evaluation, reassessments, and time considering the case)................................................................24 minutes.   Additional History from reviewing old medical records or taking additional history from the family, EMS, PCP, etc.......................6 minutes.   Ordering, Reviewing, and Interpreting Diagnostic Studies...............................................................................................................5 minutes.   Documentation..................................................................................................................................................................................4 minutes.   Consultation with other Physicians. .................................................................................................................................................8 minutes.   ==============================================================  Total Critical Care Time - exclusive of procedural time: 47 minutes.  ==============================================================  Critical care reasons: DKA, epistaxis, volume overload on chronic dialysis.   Critical care was time spent personally by me on the following activities: obtaining history from patient or  relative, examination of patient, review of old charts, ordering lab, x-rays, and/or EKG, development of treatment plan with patient or relative, ordering and performing treatments and interventions, evaluation of patient's response to treatment, discussion with consultants and re-evaluation of patient's conition.   Critical Care Condition: potentially life-threatening             Home Fallon MD   Emergency Resident   [phone] (spectra)         Home Fallon MD  Resident  03/31/23 1542       Talia García MD  04/02/23 1600

## 2023-03-31 NOTE — ASSESSMENT & PLAN NOTE
-- DKA without coma  -- Likely 2/2 eating lots of candies, including possibly 2 bags skittles today  -- Adherent with insulin and oral anti-hypoglycemics, as he reports administered by his nursing home  -- Glu ~ 800  -- pH 7.33  -- AG 17  -- CO2 19  -- Also found to have recurrent epistaxis, hgb improved since last to 8.6, hemodynamically stable  -- Pulmonary edema with SpO2 90% on 5 L in s/o COPD on oxygen at NH (unknown amount)    Recommendations:  -- Admit to hospital medicine step down unit  -- Insulin gtt per DKA protocol  -- Dialysis per nephrology and wean O2 as tolerated  -- Monitor hgb and hemodynamics. Consider ENT consult

## 2023-03-31 NOTE — HPI
Cosme Lewis is a 57 y.o. M with history of ESRD on HD, DM, HTN, COPD who presents with epistaxis. Was recently hospitalized for epistaxis and HTN urgency and discharged 3/29/23, required MICU on cardene gtt. Reports epistaxis never resolved. Denies dizziness or syncope. Denies hematuria, melena, hematochezia. Went to dialysis today and before could be dialyzed, was sent to ED due to epistaxis. On arrival, glu on istat >700. Other labs pending. Reports eating candies, possibly including two bags of skittles today. Denies missing doses of insulin or oral antihyperglycemics as he lives at nursing home and they administer all of his medications regularly. Hgb 8.6, up from 6.6 two days ago. Hemodynamically stable. SpO2 90% on 5 L O2. Does have COPD. CXR reveals pulmonary edema. Plan for dialysis today. Patient admitted to [facility] for management of DKA.

## 2023-03-31 NOTE — ED NOTES
Pt report given to charge RN on 14WT - pt to be transported by this RN via stretcher w monitor and o2 present. Pt stable.

## 2023-03-31 NOTE — ASSESSMENT & PLAN NOTE
Admitted for epistaxis and DKA. Chest x ray with bilateral edema. Echo on 3/20 EF 55%, CVP 15    Outpatient HD Information:     -HD tx days: MWF   -HD tx time: 4hrs   -HD access: LUE AVF   -HD modality: iHD   -Residual urine: Anuric      HD planned for 3/31 but was cancelled for epistaxis     Plan:  - HD per nephrology  -Renal diet, if not NPO   -Strict I/O's and daily weights  -Daily renal function panels   -Renally dose meds

## 2023-03-31 NOTE — ED NOTES
iSTAT Chem 8    Na+ 130   K+ 4.8   Cl 94   iCa 1.03   TCO2 26   Glu >700   BUN 75   Creat 4.7   Hct% 27

## 2023-03-31 NOTE — SUBJECTIVE & OBJECTIVE
Past Medical History:   Diagnosis Date    Chronic kidney disease-mineral and bone disorder 10/12/2022    COPD (chronic obstructive pulmonary disease)     Diabetes mellitus type 1     ESRD on dialysis     Hypertension     Hypervolemia 2/22/2023    Hyponatremia 3/4/2023    SOB (shortness of breath) 10/12/2022    Patient with history COPD treated with Ellipta the albuterol, fluticasone-salmeterol tachypneic in the ED saturating 94% and 6 L on nasal cannula, patient does not know how many  he uses it is in nursing home.    -duo nebs Q 4  Given that patient is a poor historian, and in the setting of left lower extremity edema and history of coagulopathy PE cannot be ruled out.  Will workup for possible infec       Past Surgical History:   Procedure Laterality Date    AV FISTULA PLACEMENT         Review of patient's allergies indicates:  No Known Allergies  Current Facility-Administered Medications   Medication Frequency    0.9%  NaCl infusion Once    dextrose 10% bolus 125 mL 125 mL PRN    dextrose 10% bolus 250 mL 250 mL PRN    insulin regular in 0.9 % NaCl 100 unit/100 mL (1 unit/mL) infusion Continuous    sodium chloride 0.9% bolus 250 mL 250 mL ED 1 Time     Current Outpatient Medications   Medication    acetaminophen (TYLENOL) 650 MG TbSR    albuterol (PROVENTIL/VENTOLIN HFA) 90 mcg/actuation inhaler    albuterol-ipratropium (DUO-NEB) 2.5 mg-0.5 mg/3 mL nebulizer solution    apixaban (ELIQUIS) 5 mg Tab    artificial tears (ISOPTO TEARS) 0.5 % ophthalmic solution    aspirin (ECOTRIN) 81 MG EC tablet    atorvastatin (LIPITOR) 40 MG tablet    carvediloL (COREG) 3.125 MG tablet    cetirizine (ZYRTEC) 10 MG tablet    epoetin xavier (PROCRIT) 10,000 unit/mL injection    erythromycin (ROMYCIN) ophthalmic ointment    FLUoxetine 40 MG capsule    fluticasone-salmeterol diskus inhaler 250-50 mcg    GLUCAGON EMERGENCY KIT, HUMAN, 1 mg injection    HYDROPHILIC CREAM TOP    insulin detemir U-100, Levemir, 100 unit/mL (3 mL) SubQ  InPn pen    insulin lispro (HUMALOG KWIKPEN INSULIN) 100 unit/mL pen    isosorbide mononitrate (IMDUR) 30 MG 24 hr tablet    lisinopriL (PRINIVIL,ZESTRIL) 40 MG tablet    melatonin 3 mg TbDL    NIFEdipine (PROCARDIA-XL) 60 MG (OSM) 24 hr tablet    nut.tx.imp.renal fxn,lac-reduc (NEPRO CARB STEADY ORAL)    ondansetron (ZOFRAN) 8 MG tablet    sevelamer carbonate (RENVELA) 800 mg Tab    sodium chloride (SALINE NASAL) 0.65 % nasal spray    tiotropium bromide (SPIRIVA RESPIMAT) 2.5 mcg/actuation inhaler    triamcinolone acetonide 0.025% (KENALOG) 0.025 % cream    vitamin renal formula, B-complex-vitamin c-folic acid, (NEPHROCAP) 1 mg Cap    vits A and D-white pet-lanolin ointment    white petrolatum (VASELINE) ointment     Family History       Problem Relation (Age of Onset)    Diabetes Mother          Tobacco Use    Smoking status: Never    Smokeless tobacco: Never   Substance and Sexual Activity    Alcohol use: Not Currently    Drug use: Not on file    Sexual activity: Not Currently     Review of Systems   Constitutional: Negative.    HENT: Negative.     Eyes: Negative.    Respiratory:  Positive for shortness of breath.    Gastrointestinal: Negative.    Endocrine: Negative.    Genitourinary:  Positive for decreased urine volume.   Skin: Negative.    Neurological:  Positive for weakness.   Psychiatric/Behavioral: Negative.     Objective:     Vital Signs (Most Recent):  Temp: 97.6 °F (36.4 °C) (03/31/23 1131)  Pulse: 78 (03/31/23 1124)  Resp: 18 (03/31/23 1124)  BP: 128/63 (03/31/23 1131)  SpO2: 100 % (03/31/23 1124) Vital Signs (24h Range):  Temp:  [97.6 °F (36.4 °C)] 97.6 °F (36.4 °C)  Pulse:  [78] 78  Resp:  [18] 18  SpO2:  [100 %] 100 %  BP: (128-138)/(63-90) 128/63     Weight: 76.2 kg (168 lb) (03/31/23 1230)  Body mass index is 24.81 kg/m².  Body surface area is 1.93 meters squared.    No intake/output data recorded.    Physical Exam  Vitals and nursing note reviewed.   Constitutional:       General: He is not in  acute distress.     Appearance: He is ill-appearing.   HENT:      Head: Normocephalic and atraumatic.      Nose:      Comments: Epistaxis     Mouth/Throat:      Mouth: Mucous membranes are moist.      Comments: Scant blood noted on oropharynx  Eyes:      Conjunctiva/sclera: Conjunctivae normal.   Cardiovascular:      Rate and Rhythm: Normal rate and regular rhythm.      Pulses: Normal pulses.      Heart sounds: Normal heart sounds.      Arteriovenous access: Left arteriovenous access is present.     Comments: UMA AVF +thrill   Pulmonary:      Effort: Pulmonary effort is normal. No respiratory distress.   Abdominal:      Palpations: Abdomen is soft.   Musculoskeletal:         General: No swelling.      Cervical back: Neck supple.   Skin:     General: Skin is warm and dry.      Capillary Refill: Capillary refill takes less than 2 seconds.   Neurological:      Mental Status: He is alert and oriented to person, place, and time.   Psychiatric:         Mood and Affect: Mood normal.       Significant Labs:  CBC:   Recent Labs   Lab 03/31/23  1218 03/31/23  1219   WBC 5.14  --    RBC 2.82*  --    HGB 8.6*  --    HCT 26.6* 27*     --    MCV 94  --    MCH 30.5  --    MCHC 32.3  --      CMP:   Recent Labs   Lab 03/31/23  1218   *   CALCIUM 8.1*   ALBUMIN 3.1*   PROT 6.7   *   K 4.7   CO2 19*   CL 96   BUN 73*   CREATININE 5.0*   ALKPHOS 194*   ALT 39   AST 28   BILITOT 0.8     All labs within the past 24 hours have been reviewed.

## 2023-03-31 NOTE — PLAN OF CARE
Received pt from Er via stretcher per ER nurse and PCT portable monitor, portable O2 and Insulin gtt infusing well to right PIV 20g to left forearm, PIV 20g to right wrist intact secured and flushed. Pt AAOX4. Hard of hearing. Transferred pt to bed, pt needed max assist. CM initiated in room, alarms set and on. NAD. Left upper AV shunt with bruit auscultated and thrill palpated.  Oriented to room and discussed plan of care, voiced understanding. O2@5LNC, Pox 97%. Reviewed chart, BS >500 insulin gtt infusing @0.2 units/kg/hr as ordered continuous. Callbell within reach.

## 2023-03-31 NOTE — SUBJECTIVE & OBJECTIVE
Past Medical History:   Diagnosis Date    Chronic kidney disease-mineral and bone disorder 10/12/2022    COPD (chronic obstructive pulmonary disease)     Diabetes mellitus type 1     ESRD on dialysis     Hypertension     Hypervolemia 2/22/2023    Hyponatremia 3/4/2023    SOB (shortness of breath) 10/12/2022    Patient with history COPD treated with Ellipta the albuterol, fluticasone-salmeterol tachypneic in the ED saturating 94% and 6 L on nasal cannula, patient does not know how many  he uses it is in nursing home.    -duo nebs Q 4  Given that patient is a poor historian, and in the setting of left lower extremity edema and history of coagulopathy PE cannot be ruled out.  Will workup for possible infec       Past Surgical History:   Procedure Laterality Date    AV FISTULA PLACEMENT         Review of patient's allergies indicates:  No Known Allergies    No current facility-administered medications on file prior to encounter.     Current Outpatient Medications on File Prior to Encounter   Medication Sig    acetaminophen (TYLENOL) 650 MG TbSR Take 650 mg by mouth every 8 (eight) hours as needed (pain or fever over 100.4).    albuterol (PROVENTIL/VENTOLIN HFA) 90 mcg/actuation inhaler Inhale 2 puffs into the lungs 4 (four) times daily as needed (breathing).    albuterol-ipratropium (DUO-NEB) 2.5 mg-0.5 mg/3 mL nebulizer solution Take 3 mLs by nebulization every 4 (four) hours while awake. Rescue    apixaban (ELIQUIS) 5 mg Tab Take 5 mg by mouth 2 (two) times daily.    artificial tears (ISOPTO TEARS) 0.5 % ophthalmic solution Place 1 drop into both eyes 6 (six) times daily.    aspirin (ECOTRIN) 81 MG EC tablet Take 81 mg by mouth once daily.    atorvastatin (LIPITOR) 40 MG tablet Take 1 tablet (40 mg total) by mouth once daily. (Patient taking differently: Take 40 mg by mouth every evening.)    carvediloL (COREG) 3.125 MG tablet Take 2 tablets (6.25 mg total) by mouth 2 (two) times daily.    cetirizine (ZYRTEC) 10 MG  tablet Take 10 mg by mouth daily as needed (itching).    epoetin xavier (PROCRIT) 10,000 unit/mL injection Inject 0.7 mLs (7,000 Units total) into the skin every Tues, Thurs, Sat.    erythromycin (ROMYCIN) ophthalmic ointment Place a 1/2 inch ribbon of ointment into the lower eyelid three times a day. (Patient taking differently: Place a 1/2 inch ribbon of ointment into the lower right eyelid three times a day.)    FLUoxetine 40 MG capsule Take 40 mg by mouth once daily.    fluticasone-salmeterol diskus inhaler 250-50 mcg Inhale 1 puff into the lungs 2 (two) times daily.    insulin detemir U-100, Levemir, 100 unit/mL (3 mL) SubQ InPn pen Inject 8 Units into the skin 2 (two) times daily.    insulin lispro (HUMALOG KWIKPEN INSULIN) 100 unit/mL pen Inject 8 Units into the skin 3 (three) times daily before meals. (Patient taking differently: Inject 8 Units into the skin 3 (three) times daily before meals. And before meals & at bedtime per sliding scale)    isosorbide mononitrate (IMDUR) 30 MG 24 hr tablet Take 1 tablet (30 mg total) by mouth once daily.    lisinopriL (PRINIVIL,ZESTRIL) 40 MG tablet Take 1 tablet (40 mg total) by mouth every evening.    melatonin 3 mg TbDL Take 9 mg by mouth nightly as needed (sleep).    NIFEdipine (PROCARDIA-XL) 60 MG (OSM) 24 hr tablet Take 1 tablet (60 mg total) by mouth 2 (two) times a day.    ondansetron (ZOFRAN) 8 MG tablet Take 1 tablet by mouth 3 (three) times daily as needed for Nausea.    sevelamer carbonate (RENVELA) 800 mg Tab Take 800 mg by mouth 3 (three) times daily.    sodium chloride (SALINE NASAL) 0.65 % nasal spray 1 spray by Nasal route as needed (dry nostril).    tiotropium bromide (SPIRIVA RESPIMAT) 2.5 mcg/actuation inhaler Inhale 2 puffs into the lungs Daily.    triamcinolone acetonide 0.025% (KENALOG) 0.025 % cream Apply topically 2 (two) times daily.    vitamin renal formula, B-complex-vitamin c-folic acid, (NEPHROCAP) 1 mg Cap Take 1 capsule by mouth once daily.     vits A and D-white pet-lanolin ointment Apply topically 2 (two) times daily. Apply twice daily to penis (Patient taking differently: Apply twice daily to penis)    white petrolatum (VASELINE) ointment Apply topically once daily. Apply small amount to both nostrils daily (Patient taking differently: Apply small amount to both nostrils daily)    GLUCAGON EMERGENCY KIT, HUMAN, 1 mg injection 1 mg into the muscle as needed if CBG < 70    HYDROPHILIC CREAM TOP Apply liberal amount to affected area twice daily for dry skin    nut.tx.imp.renal fxn,lac-reduc (NEPRO CARB STEADY ORAL) Give 1 carton by mouth with each meal.     Family History       Problem Relation (Age of Onset)    Diabetes Mother          Tobacco Use    Smoking status: Never    Smokeless tobacco: Never   Substance and Sexual Activity    Alcohol use: Not Currently    Drug use: Not on file    Sexual activity: Not Currently     Review of Systems   Constitutional:  Negative for activity change, appetite change, chills, fatigue, fever and unexpected weight change.   HENT:  Negative for trouble swallowing and voice change.    Eyes:  Positive for pain and redness. Negative for visual disturbance.   Respiratory:  Negative for cough, chest tightness, shortness of breath and wheezing.    Cardiovascular:  Negative for chest pain, palpitations and leg swelling.   Gastrointestinal:  Negative for abdominal distention, abdominal pain, anal bleeding, blood in stool, constipation, diarrhea, nausea, rectal pain and vomiting.   Endocrine: Negative.    Genitourinary: Negative.    Musculoskeletal:  Negative for back pain, neck pain and neck stiffness.   Skin:  Negative for pallor, rash and wound.   Allergic/Immunologic: Negative.    Neurological:  Negative for dizziness, seizures, syncope, weakness, light-headedness, numbness and headaches.   Hematological: Negative.    Psychiatric/Behavioral:  Negative for confusion and sleep disturbance. The patient is not nervous/anxious.     All other systems reviewed and are negative.  Objective:     Vital Signs (Most Recent):  Temp: 97.6 °F (36.4 °C) (03/31/23 1131)  Pulse: 78 (03/31/23 1124)  Resp: 18 (03/31/23 1124)  BP: 128/63 (03/31/23 1131)  SpO2: 100 % (03/31/23 1124) Vital Signs (24h Range):  Temp:  [97.6 °F (36.4 °C)] 97.6 °F (36.4 °C)  Pulse:  [78] 78  Resp:  [18] 18  SpO2:  [100 %] 100 %  BP: (128-138)/(63-90) 128/63     Weight: 76.2 kg (168 lb)  Body mass index is 24.81 kg/m².    Physical Exam  Vitals and nursing note reviewed.   Constitutional:       General: He is awake. He is not in acute distress.     Appearance: He is ill-appearing. He is not diaphoretic.      Interventions: Nasal cannula in place.   HENT:      Head: Normocephalic and atraumatic.      Right Ear: External ear normal.      Left Ear: External ear normal.      Nose: Nose normal.      Mouth/Throat:      Mouth: Mucous membranes are dry.      Pharynx: Oropharynx is clear. No oropharyngeal exudate or posterior oropharyngeal erythema.      Comments: Poor dentition.  Eyes:      General: No scleral icterus.        Right eye: No discharge.         Left eye: No discharge.      Extraocular Movements: Extraocular movements intact.      Comments: Left conjunctival hemorrhage noted.    Cardiovascular:      Rate and Rhythm: Normal rate.      Heart sounds: Murmur heard.   Systolic murmur is present with a grade of 1/6.     No friction rub. No gallop.      Arteriovenous access: Left arteriovenous access is present.     Comments: Left upper extremity AVF with palpable thrill and audible bruit.  Pulmonary:      Effort: Pulmonary effort is normal. No respiratory distress.      Breath sounds: No wheezing, rhonchi or rales.   Abdominal:      General: Bowel sounds are normal. There is no distension.      Palpations: Abdomen is soft.      Tenderness: There is no abdominal tenderness.   Musculoskeletal:      Cervical back: Neck supple.      Right lower leg: No edema.      Left lower leg: No  edema.   Skin:     General: Skin is warm and dry.      Coloration: Skin is not jaundiced.      Findings: Bruising present.   Neurological:      General: No focal deficit present.      Mental Status: He is alert. Mental status is at baseline.      Cranial Nerves: No cranial nerve deficit.      Comments: Grossly hard of hearing.   Psychiatric:         Mood and Affect: Mood normal.         Behavior: Behavior normal. Behavior is cooperative.           Significant Labs: All pertinent labs within the past 24 hours have been reviewed.    Significant Imaging: I have reviewed all pertinent imaging results/findings within the past 24 hours.

## 2023-04-01 NOTE — HOSPITAL COURSE
Pt admitted for DKA and was placed on insulin infusion. Overnight pts BG was under 100 with infusion stopped. Placed on D5 half normal with 20 KCL for the rest of the night. Morning labs showed closed gap with normalizing bicarb. BG now within range at 145. Switched back to normal home regimen with monitored food intake. Plan to discharge back to nursing home if BG remain within acceptable range. Pt not hypertensive during admission, but was slightly dyspneic. Likely needing repeat dialysis once discharged. Pt having significant instability in blood sugar readings. Pt down to 44 overnight with new abd pain and diarrhea. After small correction with juice and breakfast patient up to 199. Will continue to titrate insulin regimen. Scheduled to receive additional dialysis session before discharge.

## 2023-04-01 NOTE — PROGRESS NOTES
Blood sugar > 500 mg/dl, B/P 203/95, MD notified, night B/P medications administered, serum glucose to be drawn, will continue insulin drip at 0.2 units /kg/hr per MD.

## 2023-04-01 NOTE — PLAN OF CARE
Patient on insulin drip, titrated per protocol, SBP remains above 170, MD aware, dialysis started, will continue to monitor.  Problem: Adult Inpatient Plan of Care  Goal: Plan of Care Review  Outcome: Ongoing, Progressing  Goal: Patient-Specific Goal (Individualized)  Outcome: Ongoing, Progressing  Goal: Absence of Hospital-Acquired Illness or Injury  Outcome: Ongoing, Progressing  Goal: Optimal Comfort and Wellbeing  Outcome: Ongoing, Progressing  Goal: Readiness for Transition of Care  Outcome: Ongoing, Progressing     Problem: Device-Related Complication Risk (Hemodialysis)  Goal: Safe, Effective Therapy Delivery  Outcome: Ongoing, Progressing     Problem: Hemodynamic Instability (Hemodialysis)  Goal: Effective Tissue Perfusion  Outcome: Ongoing, Progressing     Problem: Infection (Hemodialysis)  Goal: Absence of Infection Signs and Symptoms  Outcome: Ongoing, Progressing     Problem: Diabetes Comorbidity  Goal: Blood Glucose Level Within Targeted Range  Outcome: Ongoing, Progressing     Problem: Impaired Wound Healing  Goal: Optimal Wound Healing  Outcome: Ongoing, Progressing     Problem: Skin Injury Risk Increased  Goal: Skin Health and Integrity  Outcome: Ongoing, Progressing

## 2023-04-01 NOTE — ASSESSMENT & PLAN NOTE
NYHA II-III  acutely decompensated    Results for orders placed during the hospital encounter of 03/16/23    Echo    Interpretation Summary  · The left ventricle is normal in size with normal systolic function.  · The estimated ejection fraction is 55%.  · Grade II left ventricular diastolic dysfunction.  · Moderate right ventricular enlargement with moderately reduced right ventricular systolic function.  · Biatrial enlargement.  · Interatrial septum bows to left, consider elevated right atrial pressure.  · Mild tricuspid regurgitation.  · The estimated PA systolic pressure is 54 mmHg.  · There is pulmonary hypertension.  · Elevated central venous pressure (15 mmHg).  · Small circumferential pericardial effusion.  · There are bilateral pleural effusions.  · There is ascites present.       Home GDMT/Diuretic:  - Beta Blocker: Carvedilol 3.125  - SGLT2: Contraindicated  - Diuretic: Anuric on iHD    CXR BL edema       Plan:  - Volume removal via HD   - SGLT2i not appropriate 2/2 DKA and renal failure  - Daily weights (standing if tolerated)  - Strict I/Os  - Fluid restriction at 1500mL  - Diabetic diet  - Maintain on telemetry and daily EKGs, maintain Mag >2 & K+ >4

## 2023-04-01 NOTE — PROGRESS NOTES
04/01/23 0400   Vital Signs   Temp 98 °F (36.7 °C)   Temp Source Oral   Pulse 65   Heart Rate Source Monitor;Continuous   Resp 20   Device (Oxygen Therapy) nasal cannula   /67   MAP (mmHg) 85   BP Location Right arm   BP Method Automatic   Patient Position Lying   Pre-Hemodialysis Assessment   Treatment Status Completed     HD tx completed,all blood returned.  Pt tolerated tx well. VSS.  Net UF of 3L.

## 2023-04-01 NOTE — H&P
"[facility] - [facility] ([address] Medicine  History & Physical    Patient Name: Cosme Lewis  MRN: [id number]  Patient Class: IP- Inpatient  Admission Date: 3/31/2023  Attending Physician: Sheila Kaiser*   Primary Care Provider: Primary Doctor No         Patient information was obtained from patient, EMS personnel, past medical records and ER records.     Subjective:     Principal Problem:Diabetic ketoacidosis without coma    Chief Complaint:   Chief Complaint   Patient presents with    Epistaxis     Arrives via EMS with c/o epistaxis today, was suppose to have dialysis today, hyperglycemic as well BG "high"         HPI: Cosme Lewis is a 57 y.o. M with history of ESRD on HD, DM, HTN, COPD who presents with epistaxis. Was recently hospitalized for epistaxis and HTN urgency and discharged 3/29/23, required MICU on cardene gtt. Reports epistaxis never resolved. Denies dizziness or syncope. Denies hematuria, melena, hematochezia. Went to dialysis today and before could be dialyzed, was sent to ED due to epistaxis. On arrival, glu on istat >700. Other labs pending. Reports eating candies, possibly including two bags of skittles today. Denies missing doses of insulin or oral antihyperglycemics as he lives at nursing home and they administer all of his medications regularly. Hgb 8.6, up from 6.6 two days ago. Hemodynamically stable. SpO2 90% on 5 L O2. Does have COPD. CXR reveals pulmonary edema. Plan for dialysis today. Patient admitted to [facility] for management of DKA.       Past Medical History:   Diagnosis Date    Chronic kidney disease-mineral and bone disorder 10/12/2022    COPD (chronic obstructive pulmonary disease)     Diabetes mellitus type 1     ESRD on dialysis     Hypertension     Hypervolemia 2/22/2023    Hyponatremia 3/4/2023    SOB (shortness of breath) 10/12/2022    Patient with history COPD treated with Ellipta the albuterol, fluticasone-salmeterol tachypneic in the ED " saturating 94% and 6 L on nasal cannula, patient does not know how many  he uses it is in nursing home.    -duo nebs Q 4  Given that patient is a poor historian, and in the setting of left lower extremity edema and history of coagulopathy PE cannot be ruled out.  Will workup for possible infec       Past Surgical History:   Procedure Laterality Date    AV FISTULA PLACEMENT         Review of patient's allergies indicates:  No Known Allergies    No current facility-administered medications on file prior to encounter.     Current Outpatient Medications on File Prior to Encounter   Medication Sig    acetaminophen (TYLENOL) 650 MG TbSR Take 650 mg by mouth every 8 (eight) hours as needed (pain or fever over 100.4).    albuterol (PROVENTIL/VENTOLIN HFA) 90 mcg/actuation inhaler Inhale 2 puffs into the lungs 4 (four) times daily as needed (breathing).    albuterol-ipratropium (DUO-NEB) 2.5 mg-0.5 mg/3 mL nebulizer solution Take 3 mLs by nebulization every 4 (four) hours while awake. Rescue    apixaban (ELIQUIS) 5 mg Tab Take 5 mg by mouth 2 (two) times daily.    artificial tears (ISOPTO TEARS) 0.5 % ophthalmic solution Place 1 drop into both eyes 6 (six) times daily.    aspirin (ECOTRIN) 81 MG EC tablet Take 81 mg by mouth once daily.    atorvastatin (LIPITOR) 40 MG tablet Take 1 tablet (40 mg total) by mouth once daily. (Patient taking differently: Take 40 mg by mouth every evening.)    carvediloL (COREG) 3.125 MG tablet Take 2 tablets (6.25 mg total) by mouth 2 (two) times daily.    cetirizine (ZYRTEC) 10 MG tablet Take 10 mg by mouth daily as needed (itching).    epoetin xavier (PROCRIT) 10,000 unit/mL injection Inject 0.7 mLs (7,000 Units total) into the skin every Tues, Thurs, Sat.    erythromycin (ROMYCIN) ophthalmic ointment Place a 1/2 inch ribbon of ointment into the lower eyelid three times a day. (Patient taking differently: Place a 1/2 inch ribbon of ointment into the lower right eyelid three times a  day.)    FLUoxetine 40 MG capsule Take 40 mg by mouth once daily.    fluticasone-salmeterol diskus inhaler 250-50 mcg Inhale 1 puff into the lungs 2 (two) times daily.    insulin detemir U-100, Levemir, 100 unit/mL (3 mL) SubQ InPn pen Inject 8 Units into the skin 2 (two) times daily.    insulin lispro (HUMALOG KWIKPEN INSULIN) 100 unit/mL pen Inject 8 Units into the skin 3 (three) times daily before meals. (Patient taking differently: Inject 8 Units into the skin 3 (three) times daily before meals. And before meals & at bedtime per sliding scale)    isosorbide mononitrate (IMDUR) 30 MG 24 hr tablet Take 1 tablet (30 mg total) by mouth once daily.    lisinopriL (PRINIVIL,ZESTRIL) 40 MG tablet Take 1 tablet (40 mg total) by mouth every evening.    melatonin 3 mg TbDL Take 9 mg by mouth nightly as needed (sleep).    NIFEdipine (PROCARDIA-XL) 60 MG (OSM) 24 hr tablet Take 1 tablet (60 mg total) by mouth 2 (two) times a day.    ondansetron (ZOFRAN) 8 MG tablet Take 1 tablet by mouth 3 (three) times daily as needed for Nausea.    sevelamer carbonate (RENVELA) 800 mg Tab Take 800 mg by mouth 3 (three) times daily.    sodium chloride (SALINE NASAL) 0.65 % nasal spray 1 spray by Nasal route as needed (dry nostril).    tiotropium bromide (SPIRIVA RESPIMAT) 2.5 mcg/actuation inhaler Inhale 2 puffs into the lungs Daily.    triamcinolone acetonide 0.025% (KENALOG) 0.025 % cream Apply topically 2 (two) times daily.    vitamin renal formula, B-complex-vitamin c-folic acid, (NEPHROCAP) 1 mg Cap Take 1 capsule by mouth once daily.    vits A and D-white pet-lanolin ointment Apply topically 2 (two) times daily. Apply twice daily to penis (Patient taking differently: Apply twice daily to penis)    white petrolatum (VASELINE) ointment Apply topically once daily. Apply small amount to both nostrils daily (Patient taking differently: Apply small amount to both nostrils daily)    GLUCAGON EMERGENCY KIT, HUMAN, 1 mg  injection 1 mg into the muscle as needed if CBG < 70    HYDROPHILIC CREAM TOP Apply liberal amount to affected area twice daily for dry skin    nut.tx.imp.renal fxn,lac-reduc (NEPRO CARB STEADY ORAL) Give 1 carton by mouth with each meal.     Family History       Problem Relation (Age of Onset)    Diabetes Mother          Tobacco Use    Smoking status: Never    Smokeless tobacco: Never   Substance and Sexual Activity    Alcohol use: Not Currently    Drug use: Not on file    Sexual activity: Not Currently     Review of Systems   Constitutional:  Negative for activity change, appetite change, chills, fatigue, fever and unexpected weight change.   HENT:  Negative for trouble swallowing and voice change.    Eyes:  Positive for pain and redness. Negative for visual disturbance.   Respiratory:  Negative for cough, chest tightness, shortness of breath and wheezing.    Cardiovascular:  Negative for chest pain, palpitations and leg swelling.   Gastrointestinal:  Negative for abdominal distention, abdominal pain, anal bleeding, blood in stool, constipation, diarrhea, nausea, rectal pain and vomiting.   Endocrine: Negative.    Genitourinary: Negative.    Musculoskeletal:  Negative for back pain, neck pain and neck stiffness.   Skin:  Negative for pallor, rash and wound.   Allergic/Immunologic: Negative.    Neurological:  Negative for dizziness, seizures, syncope, weakness, light-headedness, numbness and headaches.   Hematological: Negative.    Psychiatric/Behavioral:  Negative for confusion and sleep disturbance. The patient is not nervous/anxious.    All other systems reviewed and are negative.  Objective:     Vital Signs (Most Recent):  Temp: 97.6 °F (36.4 °C) (03/31/23 1131)  Pulse: 78 (03/31/23 1124)  Resp: 18 (03/31/23 1124)  BP: 128/63 (03/31/23 1131)  SpO2: 100 % (03/31/23 1124) Vital Signs (24h Range):  Temp:  [97.6 °F (36.4 °C)] 97.6 °F (36.4 °C)  Pulse:  [78] 78  Resp:  [18] 18  SpO2:  [100 %] 100 %  BP:  (128-138)/(63-90) 128/63     Weight: 76.2 kg (168 lb)  Body mass index is 24.81 kg/m².    Physical Exam  Vitals and nursing note reviewed.   Constitutional:       General: He is awake. He is not in acute distress.     Appearance: He is ill-appearing. He is not diaphoretic.      Interventions: Nasal cannula in place.   HENT:      Head: Normocephalic and atraumatic.      Right Ear: External ear normal.      Left Ear: External ear normal.      Nose: Nose normal.      Mouth/Throat:      Mouth: Mucous membranes are dry.      Pharynx: Oropharynx is clear. No oropharyngeal exudate or posterior oropharyngeal erythema.      Comments: Poor dentition.  Eyes:      General: No scleral icterus.        Right eye: No discharge.         Left eye: No discharge.      Extraocular Movements: Extraocular movements intact.      Comments: Left conjunctival hemorrhage noted.    Cardiovascular:      Rate and Rhythm: Normal rate.      Heart sounds: Murmur heard.   Systolic murmur is present with a grade of 1/6.     No friction rub. No gallop.      Arteriovenous access: Left arteriovenous access is present.     Comments: Left upper extremity AVF with palpable thrill and audible bruit.  Pulmonary:      Effort: Pulmonary effort is normal. No respiratory distress.      Breath sounds: No wheezing, rhonchi or rales.   Abdominal:      General: Bowel sounds are normal. There is no distension.      Palpations: Abdomen is soft.      Tenderness: There is no abdominal tenderness.   Musculoskeletal:      Cervical back: Neck supple.      Right lower leg: No edema.      Left lower leg: No edema.   Skin:     General: Skin is warm and dry.      Coloration: Skin is not jaundiced.      Findings: Bruising present.   Neurological:      General: No focal deficit present.      Mental Status: He is alert. Mental status is at baseline.      Cranial Nerves: No cranial nerve deficit.      Comments: Grossly hard of hearing.   Psychiatric:         Mood and Affect: Mood  normal.         Behavior: Behavior normal. Behavior is cooperative.           Significant Labs: All pertinent labs within the past 24 hours have been reviewed.    Significant Imaging: I have reviewed all pertinent imaging results/findings within the past 24 hours.    Assessment/Plan:     * Diabetic ketoacidosis without coma  -- DKA without coma  -- Likely 2/2 eating lots of candies, including possibly 2 bags skittles today  -- Adherent with insulin and oral anti-hypoglycemics, as he reports administered by his nursing home  -- Glu ~ 800  -- pH 7.33  -- AG 17  -- CO2 19  -- Also found to have recurrent epistaxis, hgb improved since last to 8.6, hemodynamically stable  -- Pulmonary edema with SpO2 90% on 5 L in s/o COPD on oxygen at NH (unknown amount)    Recommendations:  -- Insulin gtt per DKA protocol  -- Dialysis per nephrology and wean O2 as tolerated  -- Monitor hgb and hemodynamics. Consider ENT consult    ESRD on hemodialysis  Admitted for epistaxis and DKA. Chest x ray with bilateral edema. Echo on 3/20 EF 55%, CVP 15    Outpatient HD Information:     -HD tx days: MWF   -HD tx time: 4hrs   -HD access: LUE AVF   -HD modality: iHD   -Residual urine: Anuric      HD planned for 3/31 but was cancelled for epistaxis     Plan:  - HD per nephrology  -Renal diet, if not NPO   -Strict I/O's and daily weights  -Daily renal function panels   -Renally dose meds    COPD (chronic obstructive pulmonary disease)  Not currently in exacerbation   On 5L NC at home  No wheezing on exam    Plan:  - Duo-nebs q6hr PRN  - Home LABA/ICS  - Wean supplemental O2 with goal SpO2 88-92%  - Emphasized importance of smoking cessation        Anemia in ESRD (end-stage renal disease)  Daily CBC; transfuse if Hgb <7    Chronic diastolic heart failure    NYHA II-III  acutely decompensated    Results for orders placed during the hospital encounter of 03/16/23    Echo    Interpretation Summary  · The left ventricle is normal in size with normal  systolic function.  · The estimated ejection fraction is 55%.  · Grade II left ventricular diastolic dysfunction.  · Moderate right ventricular enlargement with moderately reduced right ventricular systolic function.  · Biatrial enlargement.  · Interatrial septum bows to left, consider elevated right atrial pressure.  · Mild tricuspid regurgitation.  · The estimated PA systolic pressure is 54 mmHg.  · There is pulmonary hypertension.  · Elevated central venous pressure (15 mmHg).  · Small circumferential pericardial effusion.  · There are bilateral pleural effusions.  · There is ascites present.       Home GDMT/Diuretic:  - Beta Blocker: Carvedilol 3.125  - SGLT2: Contraindicated  - Diuretic: Anuric on iHD    CXR BL edema       Plan:  - Volume removal via HD   - SGLT2i not appropriate 2/2 DKA and renal failure  - Daily weights (standing if tolerated)  - Strict I/Os  - Fluid restriction at 1500mL  - Cardiac diet  - Maintain on telemetry and daily EKGs, maintain Mag >2 & K+ >4      Chronic kidney disease-mineral and bone disorder  Per nephro      Chronic hypoxemic respiratory failure  Patient with Hypoxic Respiratory failure which is Acute on chronic.  he is on home oxygen at 5 LPM.  Signs/symptoms of respiratory failure include- tachypnea and increased work of breathing. Contributing diagnoses includes - COPD Labs and images were reviewed. Patient Has recent ABG, which has been reviewed. Will treat underlying causes and adjust management of respiratory failure as follows:  Chest xray with appearance of volume overload; rales on exam.     --Nephrology consulted for HD; volume removal  --BP control; Cardene drip  --Wean FiO2 for SpO2>88%, now on room air  --Home inhalers; PRN nebs      VTE Risk Mitigation (From admission, onward)         Ordered     Reason for no Mechanical VTE Prophylaxis  Once        Question:  Reasons:  Answer:  Physician Provided (leave comment)  Comment:  eliquis    03/31/23 1521     IP VTE HIGH  RISK PATIENT  Once         03/31/23 1521     Place sequential compression device  Until discontinued         03/31/23 1515                           Robbie Gill DO  Department of Hospital Medicine  [facility] - Intensive Care ([place]-)

## 2023-04-01 NOTE — ASSESSMENT & PLAN NOTE
Admitted for epistaxis and DKA. Chest x ray with bilateral edema. Echo on 3/20 EF 55%, CVP 15    Outpatient HD Information:     -HD tx days: MWF   -HD tx time: 4hrs   -HD access: LUE AVF   -HD modality: iHD   -Residual urine: Anuric      HD planned for 3/31 but was cancelled for epistaxis     Plan:  - HD per nephrology  -Diabetic/renal diet, if not NPO   -Strict I/O's and daily weights  -Daily renal function panels   -Renally dose meds

## 2023-04-01 NOTE — SUBJECTIVE & OBJECTIVE
Interval History: NAEON; Pt requesting more solid foods but does not have his dentures at the hospital. No other complaints at this time.     Review of Systems   Constitutional:  Negative for activity change, appetite change, chills, fatigue, fever and unexpected weight change.   HENT:  Negative for trouble swallowing and voice change.    Eyes:  Positive for pain and redness. Negative for visual disturbance.   Respiratory:  Negative for cough, chest tightness, shortness of breath and wheezing.    Cardiovascular:  Negative for chest pain, palpitations and leg swelling.   Gastrointestinal:  Negative for abdominal distention, abdominal pain, anal bleeding, blood in stool, constipation, diarrhea, nausea, rectal pain and vomiting.   Endocrine: Negative.    Genitourinary: Negative.    Musculoskeletal:  Negative for back pain, neck pain and neck stiffness.   Skin:  Negative for pallor, rash and wound.   Allergic/Immunologic: Negative.    Neurological:  Negative for dizziness, seizures, syncope, weakness, light-headedness, numbness and headaches.   Hematological: Negative.    Psychiatric/Behavioral:  Negative for confusion and sleep disturbance. The patient is not nervous/anxious.    All other systems reviewed and are negative.  Objective:     Vital Signs (Most Recent):  Temp: 97.5 °F (36.4 °C) (04/01/23 1140)  Pulse: 68 (04/01/23 1140)  Resp: 16 (04/01/23 1140)  BP: 125/73 (04/01/23 1140)  SpO2: 97 % (04/01/23 1140) Vital Signs (24h Range):  Temp:  [97 °F (36.1 °C)-98.1 °F (36.7 °C)] 97.5 °F (36.4 °C)  Pulse:  [62-71] 68  Resp:  [13-31] 16  SpO2:  [92 %-100 %] 97 %  BP: (108-212)/() 125/73     Weight: 76.2 kg (168 lb)  Body mass index is 24.81 kg/m².    Intake/Output Summary (Last 24 hours) at 4/1/2023 1322  Last data filed at 4/1/2023 0954  Gross per 24 hour   Intake 1060 ml   Output 3500 ml   Net -2440 ml      Physical Exam  Vitals and nursing note reviewed.   Constitutional:       General: He is awake. He is not  in acute distress.     Appearance: He is ill-appearing. He is not diaphoretic.      Interventions: Nasal cannula in place.   HENT:      Head: Normocephalic and atraumatic.      Right Ear: External ear normal.      Left Ear: External ear normal.      Nose: Nose normal.      Mouth/Throat:      Mouth: Mucous membranes are dry.      Pharynx: Oropharynx is clear. No oropharyngeal exudate or posterior oropharyngeal erythema.      Comments: Poor dentition.  Eyes:      General: No scleral icterus.        Right eye: No discharge.         Left eye: No discharge.      Extraocular Movements: Extraocular movements intact.      Comments: Left conjunctival hemorrhage noted.    Cardiovascular:      Rate and Rhythm: Normal rate.      Heart sounds: Murmur heard.   Systolic murmur is present with a grade of 1/6.     No friction rub. No gallop.      Arteriovenous access: Left arteriovenous access is present.     Comments: Left upper extremity AVF with palpable thrill and audible bruit.  Pulmonary:      Effort: Pulmonary effort is normal. No respiratory distress.      Breath sounds: No wheezing, rhonchi or rales.   Abdominal:      General: Bowel sounds are normal. There is no distension.      Palpations: Abdomen is soft.      Tenderness: There is no abdominal tenderness.   Musculoskeletal:      Cervical back: Neck supple.      Right lower leg: No edema.      Left lower leg: No edema.   Skin:     General: Skin is warm and dry.      Coloration: Skin is not jaundiced.      Findings: Bruising present.   Neurological:      General: No focal deficit present.      Mental Status: He is alert. Mental status is at baseline.      Cranial Nerves: No cranial nerve deficit.      Comments: Grossly hard of hearing.   Psychiatric:         Mood and Affect: Mood normal.         Behavior: Behavior normal. Behavior is cooperative.       Significant Labs: All pertinent labs within the past 24 hours have been reviewed.    Significant Imaging: I have reviewed  all pertinent imaging results/findings within the past 24 hours.

## 2023-04-01 NOTE — PROGRESS NOTES
04/01/23 0100   Vital Signs   Temp 98.1 °F (36.7 °C)   Temp Source Oral   Pulse 69   Heart Rate Source Monitor;Continuous   Resp 20   SpO2 99 %   Device (Oxygen Therapy) nasal cannula   BP (!) 194/93   MAP (mmHg) 134   BP Location Right arm   BP Method Automatic   Patient Position Sitting     Bedside HD 1:1 tx initiated via LUE AVF without issues.  Pt is awake,alert and hypertensive.  UF goal of 3L.

## 2023-04-01 NOTE — ASSESSMENT & PLAN NOTE
DKA without coma resolved  -- Also found to have recurrent epistaxis, hgb improved since last to 8.6, hemodynamically stable  -- Pulmonary edema with SpO2 90% on 5 L in s/o COPD on oxygen at NH (unknown amount)    BG now 145, gap closed with bicarb wnl.     Recommendations:  -- Insulin home regimen  -- q4h POCT glucose checks  -- Mechanical soft diet  -- Dialysis per nephrology and wean O2 as tolerated  -- Monitor hgb and hemodynamics

## 2023-04-01 NOTE — PROGRESS NOTES
[facility] - Intensive Care ([address] Medicine  Progress Note    Patient Name: Cosme Lewis  MRN: [id number]  Patient Class: IP- Inpatient   Admission Date: 3/31/2023  Length of Stay: 1 days  Attending Physician: Sheila Kaiser*  Primary Care Provider: Primary Doctor No        Subjective:     Principal Problem:Diabetic ketoacidosis without coma        HPI:  Cosme Lewis is a 57 y.o. M with history of ESRD on HD, DM, HTN, COPD who presents with epistaxis. Was recently hospitalized for epistaxis and HTN urgency and discharged 3/29/23, required MICU on cardene gtt. Reports epistaxis never resolved. Denies dizziness or syncope. Denies hematuria, melena, hematochezia. Went to dialysis today and before could be dialyzed, was sent to ED due to epistaxis. On arrival, glu on istat >700. Other labs pending. Reports eating candies, possibly including two bags of skittles today. Denies missing doses of insulin or oral antihyperglycemics as he lives at nursing home and they administer all of his medications regularly. Hgb 8.6, up from 6.6 two days ago. Hemodynamically stable. SpO2 90% on 5 L O2. Does have COPD. CXR reveals pulmonary edema. Plan for dialysis today. Patient admitted to [facility] for management of DKA.       Overview/Hospital Course:  Pt admitted for DKA and was placed on insulin infusion. Overnight pts BG was under 100 with infusion stopped. Placed on D5 half normal with 20 KCL for the rest of the night. Morning labs showed closed gap with normalizing bicarb. BG now within range at 145. Switched back to normal home regimen with monitored food intake. Plan to discharge back to nursing home if BG remain within acceptable range. Pt not hypertensive during admission, but was slightly dyspneic. Likely needing repeat dialysis once discharged.       Interval History: NAEON; Pt requesting more solid foods but does not have his dentures at the hospital. No other complaints at this time.     Review of Systems    Constitutional:  Negative for activity change, appetite change, chills, fatigue, fever and unexpected weight change.   HENT:  Negative for trouble swallowing and voice change.    Eyes:  Positive for pain and redness. Negative for visual disturbance.   Respiratory:  Negative for cough, chest tightness, shortness of breath and wheezing.    Cardiovascular:  Negative for chest pain, palpitations and leg swelling.   Gastrointestinal:  Negative for abdominal distention, abdominal pain, anal bleeding, blood in stool, constipation, diarrhea, nausea, rectal pain and vomiting.   Endocrine: Negative.    Genitourinary: Negative.    Musculoskeletal:  Negative for back pain, neck pain and neck stiffness.   Skin:  Negative for pallor, rash and wound.   Allergic/Immunologic: Negative.    Neurological:  Negative for dizziness, seizures, syncope, weakness, light-headedness, numbness and headaches.   Hematological: Negative.    Psychiatric/Behavioral:  Negative for confusion and sleep disturbance. The patient is not nervous/anxious.    All other systems reviewed and are negative.  Objective:     Vital Signs (Most Recent):  Temp: 97.5 °F (36.4 °C) (04/01/23 1140)  Pulse: 68 (04/01/23 1140)  Resp: 16 (04/01/23 1140)  BP: 125/73 (04/01/23 1140)  SpO2: 97 % (04/01/23 1140) Vital Signs (24h Range):  Temp:  [97 °F (36.1 °C)-98.1 °F (36.7 °C)] 97.5 °F (36.4 °C)  Pulse:  [62-71] 68  Resp:  [13-31] 16  SpO2:  [92 %-100 %] 97 %  BP: (108-212)/() 125/73     Weight: 76.2 kg (168 lb)  Body mass index is 24.81 kg/m².    Intake/Output Summary (Last 24 hours) at 4/1/2023 1322  Last data filed at 4/1/2023 0954  Gross per 24 hour   Intake 1060 ml   Output 3500 ml   Net -2440 ml      Physical Exam  Vitals and nursing note reviewed.   Constitutional:       General: He is awake. He is not in acute distress.     Appearance: He is ill-appearing. He is not diaphoretic.      Interventions: Nasal cannula in place.   HENT:      Head: Normocephalic and  atraumatic.      Right Ear: External ear normal.      Left Ear: External ear normal.      Nose: Nose normal.      Mouth/Throat:      Mouth: Mucous membranes are dry.      Pharynx: Oropharynx is clear. No oropharyngeal exudate or posterior oropharyngeal erythema.      Comments: Poor dentition.  Eyes:      General: No scleral icterus.        Right eye: No discharge.         Left eye: No discharge.      Extraocular Movements: Extraocular movements intact.      Comments: Left conjunctival hemorrhage noted.    Cardiovascular:      Rate and Rhythm: Normal rate.      Heart sounds: Murmur heard.   Systolic murmur is present with a grade of 1/6.     No friction rub. No gallop.      Arteriovenous access: Left arteriovenous access is present.     Comments: Left upper extremity AVF with palpable thrill and audible bruit.  Pulmonary:      Effort: Pulmonary effort is normal. No respiratory distress.      Breath sounds: No wheezing, rhonchi or rales.   Abdominal:      General: Bowel sounds are normal. There is no distension.      Palpations: Abdomen is soft.      Tenderness: There is no abdominal tenderness.   Musculoskeletal:      Cervical back: Neck supple.      Right lower leg: No edema.      Left lower leg: No edema.   Skin:     General: Skin is warm and dry.      Coloration: Skin is not jaundiced.      Findings: Bruising present.   Neurological:      General: No focal deficit present.      Mental Status: He is alert. Mental status is at baseline.      Cranial Nerves: No cranial nerve deficit.      Comments: Grossly hard of hearing.   Psychiatric:         Mood and Affect: Mood normal.         Behavior: Behavior normal. Behavior is cooperative.       Significant Labs: All pertinent labs within the past 24 hours have been reviewed.    Significant Imaging: I have reviewed all pertinent imaging results/findings within the past 24 hours.      Assessment/Plan:      * Diabetic ketoacidosis without coma   DKA without coma resolved  --  Also found to have recurrent epistaxis, hgb improved since last to 8.6, hemodynamically stable  -- Pulmonary edema with SpO2 90% on 5 L in s/o COPD on oxygen at NH (unknown amount)    BG now 145, gap closed with bicarb wnl.     Recommendations:  -- Insulin home regimen  -- q4h POCT glucose checks  -- Mechanical soft diet  -- Dialysis per nephrology and wean O2 as tolerated  -- Monitor hgb and hemodynamics    ESRD on hemodialysis  Admitted for epistaxis and DKA. Chest x ray with bilateral edema. Echo on 3/20 EF 55%, CVP 15    Outpatient HD Information:     -HD tx days: MWF   -HD tx time: 4hrs   -HD access: LUE AVF   -HD modality: iHD   -Residual urine: Anuric      HD planned for 3/31 but was cancelled for epistaxis     Plan:  - HD per nephrology  -Diabetic/renal diet, if not NPO   -Strict I/O's and daily weights  -Daily renal function panels   -Renally dose meds    COPD (chronic obstructive pulmonary disease)  Not currently in exacerbation   On 5L NC at home  No wheezing on exam    Plan:  - Duo-nebs q6hr PRN  - Home LABA/ICS  - Wean supplemental O2 with goal SpO2 88-92%  - Emphasized importance of smoking cessation        Anemia in ESRD (end-stage renal disease)  Daily CBC; transfuse if Hgb <7    Chronic diastolic heart failure    NYHA II-III  acutely decompensated    Results for orders placed during the hospital encounter of 03/16/23    Echo    Interpretation Summary  · The left ventricle is normal in size with normal systolic function.  · The estimated ejection fraction is 55%.  · Grade II left ventricular diastolic dysfunction.  · Moderate right ventricular enlargement with moderately reduced right ventricular systolic function.  · Biatrial enlargement.  · Interatrial septum bows to left, consider elevated right atrial pressure.  · Mild tricuspid regurgitation.  · The estimated PA systolic pressure is 54 mmHg.  · There is pulmonary hypertension.  · Elevated central venous pressure (15 mmHg).  · Small  circumferential pericardial effusion.  · There are bilateral pleural effusions.  · There is ascites present.       Home GDMT/Diuretic:  - Beta Blocker: Carvedilol 3.125  - SGLT2: Contraindicated  - Diuretic: Anuric on iHD    CXR BL edema       Plan:  - Volume removal via HD   - SGLT2i not appropriate 2/2 DKA and renal failure  - Daily weights (standing if tolerated)  - Strict I/Os  - Fluid restriction at 1500mL  - Diabetic diet  - Maintain on telemetry and daily EKGs, maintain Mag >2 & K+ >4      Chronic kidney disease-mineral and bone disorder  Per nephro      Chronic hypoxemic respiratory failure  Patient with Hypoxic Respiratory failure which is Acute on chronic.  he is on home oxygen at 5 LPM.  Signs/symptoms of respiratory failure include- tachypnea and increased work of breathing. Contributing diagnoses includes - COPD Labs and images were reviewed. Patient Has recent ABG, which has been reviewed. Will treat underlying causes and adjust management of respiratory failure as follows:  Chest xray with appearance of volume overload; rales on exam.     --Nephrology consulted for HD; volume removal  --BP control; Cardene drip  --Wean FiO2 for SpO2>88%, now on room air  --Home inhalers; PRN nebs      VTE Risk Mitigation (From admission, onward)         Ordered     Reason for no Mechanical VTE Prophylaxis  Once        Question:  Reasons:  Answer:  Physician Provided (leave comment)  Comment:  eliquis    03/31/23 1521     IP VTE HIGH RISK PATIENT  Once         03/31/23 1521     Place sequential compression device  Until discontinued         03/31/23 1515                Discharge Planning   GERA: 4/1/2023     Code Status: Full Code   Is the patient medically ready for discharge?: Yes    Reason for patient still in hospital (select all that apply): Patient trending condition and Treatment               Yash De La O MD  Department of Hospital Medicine   [facility] - Intensive Care ([facility]-)

## 2023-04-01 NOTE — CARE UPDATE
RAPID RESPONSE NURSE CHART REVIEW        Chart Reviewed: 04/01/2023, 11:29 AM    MRN: [id number]  Bed: 30603/65710 A    Dx: Diabetic ketoacidosis without coma    Cosme Lewis has a past medical history of Chronic kidney disease-mineral and bone disorder, COPD (chronic obstructive pulmonary disease), Diabetes mellitus type 1, ESRD on dialysis, Hypertension, Hypervolemia, Hyponatremia, and SOB (shortness of breath).    Last VS: /71 (BP Location: Right arm, Patient Position: Lying)   Pulse 68   Temp 97.8 °F (36.6 °C) (Oral)   Resp (!) 23   Wt 76.2 kg (168 lb)   SpO2 100%   BMI 24.81 kg/m²     24H Vital Sign Range:  Temp:  [97 °F (36.1 °C)-98.1 °F (36.7 °C)]   Pulse:  [62-71]   Resp:  [13-31]   BP: (108-212)/()   SpO2:  [92 %-100 %]     Level of Consciousness (AVPU): alert    Recent Labs     03/31/23  1218 03/31/23  1219 03/31/23  1446 03/31/23  1700 04/01/23  0739   WBC 5.14  --   --   --  4.32   HGB 8.6*  --   --   --  7.8*   HCT 26.6*   < > 23* 25* 23.3*     --   --   --  211    < > = values in this interval not displayed.       Recent Labs     03/31/23  1259 03/31/23  1330 03/31/23  1959 03/31/23  2200 03/31/23  2354 04/01/23  0739   *  --  131*  --  132* 133*  134*   K 4.6  --  3.7  --  3.8 3.9  3.9   CL 95  --  94*  --  98 101  100   CO2 20*  --  21*  --  18* 23  24   CREATININE 4.8*  --  5.1*  --  4.7* 3.3*  3.4*   *   < > 557*  557* 366* 210*  210* 93  92   PHOS 4.6*  --   --   --   --  2.6*   MG 2.0  --   --   --   --  1.8    < > = values in this interval not displayed.        Recent Labs     03/31/23  1303   PH 7.329*   PCO2 49.9*   PO2 56   HCO3 26.2   POCSATURATED 86*   BE 0        OXYGEN:  Flow (L/min): (S) 3          MEWS score: 1    Charge RN, Imani  contacted for HTN overnight. No additional concerns verbalized at this time. Instructed to call [phone] for further concerns or assistance.    Lencho Blake RN

## 2023-04-02 NOTE — SUBJECTIVE & OBJECTIVE
Interval History: Pt BG down to 44 this morning. Diarrhea and abdominal pain. Obtained lactate and lipse which were both normal. After juice and breakfast patient up to 199 BG.     Review of Systems   Constitutional:  Negative for activity change, appetite change, chills, fatigue, fever and unexpected weight change.   HENT:  Negative for trouble swallowing and voice change.    Eyes:  Positive for pain and redness. Negative for visual disturbance.   Respiratory:  Negative for cough, chest tightness, shortness of breath and wheezing.    Cardiovascular:  Negative for chest pain, palpitations and leg swelling.   Gastrointestinal:  Negative for abdominal distention, abdominal pain, anal bleeding, blood in stool, constipation, diarrhea, nausea, rectal pain and vomiting.   Endocrine: Negative.    Genitourinary: Negative.    Musculoskeletal:  Negative for back pain, neck pain and neck stiffness.   Skin:  Negative for pallor, rash and wound.   Allergic/Immunologic: Negative.    Neurological:  Negative for dizziness, seizures, syncope, weakness, light-headedness, numbness and headaches.   Hematological: Negative.    Psychiatric/Behavioral:  Negative for confusion and sleep disturbance. The patient is not nervous/anxious.    All other systems reviewed and are negative.  Objective:     Vital Signs (Most Recent):  Temp: 97.7 °F (36.5 °C) (04/02/23 1100)  Pulse: 62 (04/02/23 1120)  Resp: 19 (04/02/23 1100)  BP: (!) 178/83 (04/02/23 1100)  SpO2: 96 % (04/02/23 1120)   Vital Signs (24h Range):  Temp:  [97.1 °F (36.2 °C)-98.4 °F (36.9 °C)] 97.7 °F (36.5 °C)  Pulse:  [60-71] 62  Resp:  [11-20] 19  SpO2:  [92 %-100 %] 96 %  BP: (112-178)/(69-83) 178/83     Weight: 76.2 kg (168 lb)  Body mass index is 24.81 kg/m².    Intake/Output Summary (Last 24 hours) at 4/2/2023 1224  Last data filed at 4/2/2023 0912  Gross per 24 hour   Intake 600 ml   Output 0 ml   Net 600 ml      Physical Exam  Vitals and nursing note reviewed.   Constitutional:        General: He is awake. He is not in acute distress.     Appearance: He is ill-appearing. He is not diaphoretic.      Interventions: Nasal cannula in place.   HENT:      Head: Normocephalic and atraumatic.      Right Ear: External ear normal.      Left Ear: External ear normal.      Nose: Nose normal.      Mouth/Throat:      Mouth: Mucous membranes are dry.      Pharynx: Oropharynx is clear. No oropharyngeal exudate or posterior oropharyngeal erythema.      Comments: Poor dentition.  Eyes:      General: No scleral icterus.        Right eye: No discharge.         Left eye: No discharge.      Extraocular Movements: Extraocular movements intact.      Comments: Left conjunctival hemorrhage noted.    Cardiovascular:      Rate and Rhythm: Normal rate.      Heart sounds: Murmur heard.   Systolic murmur is present with a grade of 1/6.     No friction rub. No gallop.      Arteriovenous access: Left arteriovenous access is present.     Comments: Left upper extremity AVF with palpable thrill and audible bruit.  Pulmonary:      Effort: Pulmonary effort is normal. No respiratory distress.      Breath sounds: No wheezing, rhonchi or rales.   Abdominal:      General: Bowel sounds are normal. There is no distension.      Palpations: Abdomen is soft.      Tenderness: There is no abdominal tenderness.   Musculoskeletal:      Cervical back: Neck supple.      Right lower leg: No edema.      Left lower leg: No edema.   Skin:     General: Skin is warm and dry.      Coloration: Skin is not jaundiced.      Findings: Bruising present.   Neurological:      General: No focal deficit present.      Mental Status: He is alert. Mental status is at baseline.      Cranial Nerves: No cranial nerve deficit.      Comments: Grossly hard of hearing.   Psychiatric:         Mood and Affect: Mood normal.         Behavior: Behavior normal. Behavior is cooperative.       Significant Labs: All pertinent labs within the past 24 hours have been  reviewed.    Significant Imaging: I have reviewed all pertinent imaging results/findings within the past 24 hours.

## 2023-04-02 NOTE — ASSESSMENT & PLAN NOTE
DKA without coma resolved  -- Also found to have recurrent epistaxis, hgb improved since last to 8.6, hemodynamically stable  -- Pulmonary edema with SpO2 90% on 5 L in s/o COPD on oxygen at NH (unknown amount)    BG now 145, gap closed with bicarb wnl.     Recommendations:  - Insulin home regimen  - q4h POCT glucose checks  - Mechanical soft diet  - Dialysis per nephrology and wean O2 as tolerated  - Monitor hgb and hemodynamics  - KUB flat and erect for abd pain  - lipase  - lactate

## 2023-04-02 NOTE — NURSING
Pt BG=50 per glucometer. Notified by lab of critical BG=41. Pt consumed a snack. Rechecked BG=70. Paged on call team, no call back. Will continue to monitor.

## 2023-04-03 NOTE — SUBJECTIVE & OBJECTIVE
Interval History: Nurse mentions overnight that patient had been asking his mother to bring him donuts and fries despite extensive conversation about the need to eat healthier food. Basal insulin held yesterday for consistent morning lows in the 40s. 309 this morning. Will restart morning basal but cut night in half.     Review of Systems   Constitutional:  Negative for activity change, appetite change, chills, fatigue, fever and unexpected weight change.   HENT:  Negative for trouble swallowing and voice change.    Eyes:  Positive for pain and redness. Negative for visual disturbance.   Respiratory:  Negative for cough, chest tightness, shortness of breath and wheezing.    Cardiovascular:  Negative for chest pain, palpitations and leg swelling.   Gastrointestinal:  Negative for abdominal distention, abdominal pain, anal bleeding, blood in stool, constipation, diarrhea, nausea, rectal pain and vomiting.   Endocrine: Negative.    Genitourinary: Negative.    Musculoskeletal:  Negative for back pain, neck pain and neck stiffness.   Skin:  Negative for pallor, rash and wound.   Allergic/Immunologic: Negative.    Neurological:  Negative for dizziness, seizures, syncope, weakness, light-headedness, numbness and headaches.   Hematological: Negative.    Psychiatric/Behavioral:  Negative for confusion and sleep disturbance. The patient is not nervous/anxious.    All other systems reviewed and are negative.  Objective:     Vital Signs (Most Recent):  Temp: 97.7 °F (36.5 °C) (04/03/23 0728)  Pulse: 66 (04/03/23 0728)  Resp: 19 (04/03/23 0728)  BP: (!) 146/85 (04/03/23 0728)  SpO2: 98 % (04/03/23 0728)   Vital Signs (24h Range):  Temp:  [97.7 °F (36.5 °C)-98.5 °F (36.9 °C)] 97.7 °F (36.5 °C)  Pulse:  [62-70] 66  Resp:  [13-20] 19  SpO2:  [96 %-100 %] 98 %  BP: (139-178)/(64-88) 146/85     Weight: 76.2 kg (168 lb)  Body mass index is 24.81 kg/m².    Intake/Output Summary (Last 24 hours) at 4/3/2023 1018  Last data filed at  4/3/2023 0915  Gross per 24 hour   Intake 360 ml   Output --   Net 360 ml      Physical Exam  Vitals and nursing note reviewed.   Constitutional:       General: He is awake. He is not in acute distress.     Appearance: He is ill-appearing. He is not diaphoretic.      Interventions: Nasal cannula in place.   HENT:      Head: Normocephalic and atraumatic.      Right Ear: External ear normal.      Left Ear: External ear normal.      Nose: Nose normal.      Mouth/Throat:      Mouth: Mucous membranes are dry.      Pharynx: Oropharynx is clear. No oropharyngeal exudate or posterior oropharyngeal erythema.      Comments: Poor dentition.  Eyes:      General: No scleral icterus.        Right eye: No discharge.         Left eye: No discharge.      Extraocular Movements: Extraocular movements intact.      Comments: Left conjunctival hemorrhage noted.    Cardiovascular:      Rate and Rhythm: Normal rate.      Heart sounds: Murmur heard.   Systolic murmur is present with a grade of 1/6.     No friction rub. No gallop.      Arteriovenous access: Left arteriovenous access is present.     Comments: Left upper extremity AVF with palpable thrill and audible bruit.  Pulmonary:      Effort: Pulmonary effort is normal. No respiratory distress.      Breath sounds: No wheezing, rhonchi or rales.   Abdominal:      General: Bowel sounds are normal. There is no distension.      Palpations: Abdomen is soft.      Tenderness: There is no abdominal tenderness.   Musculoskeletal:      Cervical back: Neck supple.      Right lower leg: No edema.      Left lower leg: No edema.   Skin:     General: Skin is warm and dry.      Coloration: Skin is not jaundiced.      Findings: Bruising present.   Neurological:      General: No focal deficit present.      Mental Status: He is alert. Mental status is at baseline.      Cranial Nerves: No cranial nerve deficit.      Comments: Grossly hard of hearing.   Psychiatric:         Mood and Affect: Mood normal.          Behavior: Behavior normal. Behavior is cooperative.       Significant Labs: All pertinent labs within the past 24 hours have been reviewed.    Significant Imaging: I have reviewed all pertinent imaging results/findings within the past 24 hours.

## 2023-04-03 NOTE — PLAN OF CARE
Patient expected to dc back to [facility] when stable to dc.         Delmi Metz LMSW  [facility]   [phone]

## 2023-04-03 NOTE — PLAN OF CARE
[facility] - Intensive Care ([place] [address])  Initial Discharge Assessment       Primary Care Provider: Primary Doctor No    Admission Diagnosis: Epistaxis [R04.0]  ESRD (end stage renal disease) [N18.6]  Type 1 diabetes mellitus with hyperosmolar hyperglycemic state (HHS) [E10.69, E10.65, E87.0]    Admission Date: 3/31/2023  Expected Discharge Date: 4/3/2023    Discharge Barriers Identified: (P) None    Payor: MEDICARE / Plan: MEDICARE PART A & B / Product Type: Government /     Extended Emergency Contact Information  Primary Emergency Contact: Kassandra Leon  Mobile Phone: [phone]  Relation: Mother    Discharge Plan A: (P) Home with family  Discharge Plan B: (P) Home Health      [facility] [address]  [facility] [address]  Phone: [phone] Fax: [phone]      Initial Assessment (most recent)       Adult Discharge Assessment - 04/03/23 1257          Discharge Assessment    Assessment Type Discharge Planning Assessment     Confirmed/corrected address, phone number and insurance Yes     Confirmed Demographics Correct on Facesheet     Source of Information health record     Does patient/caregiver understand observation status Yes     Communicated GERA with patient/caregiver Yes     Reason For Admission Hypertensive emergency     Facility Arrived From: [facility] (P)      Do you expect to return to your current living situation? Yes     Do you have help at home or someone to help you manage your care at home? Yes     Who are your caregiver(s) and their phone number(s)? mother Newton, [phone] (P)      Prior to hospitilization cognitive status: Alert/Oriented (P)      Current cognitive status: Alert/Oriented (P)      Walking or Climbing Stairs ambulation difficulty, requires equipment (P)      Mobility Management wheelchair (P)      Dressing/Bathing bathing difficulty, requires equipment (P)      Equipment Currently Used at Home wheelchair (P)      Readmission within  30 days? No (P)      Patient currently being followed by outpatient case management? No (P)      Do you currently have service(s) that help you manage your care at home? No (P)      Do you take prescription medications? Yes (P)      Do you have prescription coverage? Yes (P)      Do you have any problems affording any of your prescribed medications? No (P)      Is the patient taking medications as prescribed? yes (P)      Who is going to help you get home at discharge? Kassandra Leon, mother, [phone] (P)      How do you get to doctors appointments? car, drives self;family or friend will provide (P)      Are you on dialysis? Yes (P)      Dialysis Name and Scheduled days [facility] (P)      Do you take coumadin? No (P)      Discharge Plan A Home with family (P)      Discharge Plan B Home Health (P)      DME Needed Upon Discharge  none (P)      Discharge Plan discussed with: Patient (P)      Discharge Barriers Identified None (P)         Housing Stability    In the last 12 months, was there a time when you were not able to pay the mortgage or rent on time? No (P)      In the last 12 months, was there a time when you did not have a steady place to sleep or slept in a shelter (including now)? No (P)         Transportation Needs    In the past 12 months, has lack of transportation kept you from medical appointments or from getting medications? No (P)      In the past 12 months, has lack of transportation kept you from meetings, work, or from getting things needed for daily living? No (P)         Social Connections    How often do you attend meetings of the clubs or organizations you belong to? -- (P)    Patient is resident in NH       Alcohol Use    Q1: How often do you have a drink containing alcohol? -- (P)    Patient is a resident in [facility]                       SW met with patient in room in an attempt to complete Discharge Planning Assessment.  Patient was unable to answer questions due to very hard of hearing and very  sleepy.Patient is a current resident at [facility]. Patient was dependent with ADLS and is bed bound.  Patient is on dialysis  at [facility], and does not take Coumadin. Patient takes Elequis.  Patient will return to [facility] upon discharge.      Delmi Metz LMSW  [facility]   [id number]

## 2023-04-03 NOTE — PROGRESS NOTES
[facility] - [facility] ([address] Medicine  Progress Note    Patient Name: Cosme Lewis  MRN: [id number]  Patient Class: IP- Inpatient   Admission Date: 3/31/2023  Length of Stay: 3 days  Attending Physician: Sheila Kaiser*  Primary Care Provider: Primary Doctor No        Subjective:     Principal Problem:Diabetic ketoacidosis without coma        HPI:  Cosme Lewis is a 57 y.o. M with history of ESRD on HD, DM, HTN, COPD who presents with epistaxis. Was recently hospitalized for epistaxis and HTN urgency and discharged 3/29/23, required MICU on cardene gtt. Reports epistaxis never resolved. Denies dizziness or syncope. Denies hematuria, melena, hematochezia. Went to dialysis today and before could be dialyzed, was sent to ED due to epistaxis. On arrival, glu on istat >700. Other labs pending. Reports eating candies, possibly including two bags of skittles today. Denies missing doses of insulin or oral antihyperglycemics as he lives at nursing home and they administer all of his medications regularly. Hgb 8.6, up from 6.6 two days ago. Hemodynamically stable. SpO2 90% on 5 L O2. Does have COPD. CXR reveals pulmonary edema. Plan for dialysis today. Patient admitted to [facility] for management of DKA.       Overview/Hospital Course:  Pt admitted for DKA and was placed on insulin infusion. Overnight pts BG was under 100 with infusion stopped. Placed on D5 half normal with 20 KCL for the rest of the night. Morning labs showed closed gap with normalizing bicarb. BG now within range at 145. Switched back to normal home regimen with monitored food intake. Plan to discharge back to nursing home if BG remain within acceptable range. Pt not hypertensive during admission, but was slightly dyspneic. Likely needing repeat dialysis once discharged. Pt having significant instability in blood sugar readings. Pt down to 44 overnight with new abd pain and diarrhea. After small correction with juice and breakfast  patient up to 199. Will continue to titrate insulin regimen. Scheduled to receive additional dialysis session before discharge.       Interval History: Nurse mentions overnight that patient had been asking his mother to bring him donuts and fries despite extensive conversation about the need to eat healthier food. Basal insulin held yesterday for consistent morning lows in the 40s. 309 this morning. Will restart morning basal but cut night in half.     Review of Systems   Constitutional:  Negative for activity change, appetite change, chills, fatigue, fever and unexpected weight change.   HENT:  Negative for trouble swallowing and voice change.    Eyes:  Positive for pain and redness. Negative for visual disturbance.   Respiratory:  Negative for cough, chest tightness, shortness of breath and wheezing.    Cardiovascular:  Negative for chest pain, palpitations and leg swelling.   Gastrointestinal:  Negative for abdominal distention, abdominal pain, anal bleeding, blood in stool, constipation, diarrhea, nausea, rectal pain and vomiting.   Endocrine: Negative.    Genitourinary: Negative.    Musculoskeletal:  Negative for back pain, neck pain and neck stiffness.   Skin:  Negative for pallor, rash and wound.   Allergic/Immunologic: Negative.    Neurological:  Negative for dizziness, seizures, syncope, weakness, light-headedness, numbness and headaches.   Hematological: Negative.    Psychiatric/Behavioral:  Negative for confusion and sleep disturbance. The patient is not nervous/anxious.    All other systems reviewed and are negative.  Objective:     Vital Signs (Most Recent):  Temp: 97.7 °F (36.5 °C) (04/03/23 0728)  Pulse: 66 (04/03/23 0728)  Resp: 19 (04/03/23 0728)  BP: (!) 146/85 (04/03/23 0728)  SpO2: 98 % (04/03/23 0728)   Vital Signs (24h Range):  Temp:  [97.7 °F (36.5 °C)-98.5 °F (36.9 °C)] 97.7 °F (36.5 °C)  Pulse:  [62-70] 66  Resp:  [13-20] 19  SpO2:  [96 %-100 %] 98 %  BP: (139-178)/(64-88) 146/85     Weight:  76.2 kg (168 lb)  Body mass index is 24.81 kg/m².    Intake/Output Summary (Last 24 hours) at 4/3/2023 1018  Last data filed at 4/3/2023 0915  Gross per 24 hour   Intake 360 ml   Output --   Net 360 ml      Physical Exam  Vitals and nursing note reviewed.   Constitutional:       General: He is awake. He is not in acute distress.     Appearance: He is ill-appearing. He is not diaphoretic.      Interventions: Nasal cannula in place.   HENT:      Head: Normocephalic and atraumatic.      Right Ear: External ear normal.      Left Ear: External ear normal.      Nose: Nose normal.      Mouth/Throat:      Mouth: Mucous membranes are dry.      Pharynx: Oropharynx is clear. No oropharyngeal exudate or posterior oropharyngeal erythema.      Comments: Poor dentition.  Eyes:      General: No scleral icterus.        Right eye: No discharge.         Left eye: No discharge.      Extraocular Movements: Extraocular movements intact.      Comments: Left conjunctival hemorrhage noted.    Cardiovascular:      Rate and Rhythm: Normal rate.      Heart sounds: Murmur heard.   Systolic murmur is present with a grade of 1/6.     No friction rub. No gallop.      Arteriovenous access: Left arteriovenous access is present.     Comments: Left upper extremity AVF with palpable thrill and audible bruit.  Pulmonary:      Effort: Pulmonary effort is normal. No respiratory distress.      Breath sounds: No wheezing, rhonchi or rales.   Abdominal:      General: Bowel sounds are normal. There is no distension.      Palpations: Abdomen is soft.      Tenderness: There is no abdominal tenderness.   Musculoskeletal:      Cervical back: Neck supple.      Right lower leg: No edema.      Left lower leg: No edema.   Skin:     General: Skin is warm and dry.      Coloration: Skin is not jaundiced.      Findings: Bruising present.   Neurological:      General: No focal deficit present.      Mental Status: He is alert. Mental status is at baseline.      Cranial  Nerves: No cranial nerve deficit.      Comments: Grossly hard of hearing.   Psychiatric:         Mood and Affect: Mood normal.         Behavior: Behavior normal. Behavior is cooperative.       Significant Labs: All pertinent labs within the past 24 hours have been reviewed.    Significant Imaging: I have reviewed all pertinent imaging results/findings within the past 24 hours.      Assessment/Plan:      * Diabetic ketoacidosis without coma   DKA without coma resolved  -- Also found to have recurrent epistaxis, hgb improved since last to 8.6, hemodynamically stable  -- Pulmonary edema with SpO2 90% on 5 L in s/o COPD on oxygen at NH (unknown amount)    BG now 145, gap closed with bicarb wnl.     Recommendations:  - Long acting insulin 8u/4u morning/night dosage for chronic morning lows  - q4h POCT glucose checks  - Mechanical soft diet  - Dialysis per nephrology and wean O2 as tolerated  - Monitor hgb and hemodynamics      ESRD on hemodialysis  Admitted for epistaxis and DKA. Chest x ray with bilateral edema. Echo on 3/20 EF 55%, CVP 15    Outpatient HD Information:     -HD tx days: MWF   -HD tx time: 4hrs   -HD access: LUE AVF   -HD modality: iHD   -Residual urine: Anuric      HD planned for 3/31 but was cancelled for epistaxis     Plan:  - HD per nephrology  -Diabetic/renal diet, if not NPO   -Strict I/O's and daily weights  -Daily renal function panels   -Renally dose meds    COPD (chronic obstructive pulmonary disease)  Not currently in exacerbation   On 5L NC at home  No wheezing on exam    Plan:  - Duo-nebs q6hr PRN  - Home LABA/ICS  - Wean supplemental O2 with goal SpO2 88-92%  - Emphasized importance of smoking cessation        Anemia in ESRD (end-stage renal disease)  Daily CBC; transfuse if Hgb <7    Chronic diastolic heart failure    NYHA II-III  acutely decompensated    Results for orders placed during the hospital encounter of 03/16/23    Echo    Interpretation Summary  · The left ventricle is normal in  size with normal systolic function.  · The estimated ejection fraction is 55%.  · Grade II left ventricular diastolic dysfunction.  · Moderate right ventricular enlargement with moderately reduced right ventricular systolic function.  · Biatrial enlargement.  · Interatrial septum bows to left, consider elevated right atrial pressure.  · Mild tricuspid regurgitation.  · The estimated PA systolic pressure is 54 mmHg.  · There is pulmonary hypertension.  · Elevated central venous pressure (15 mmHg).  · Small circumferential pericardial effusion.  · There are bilateral pleural effusions.  · There is ascites present.       Home GDMT/Diuretic:  - Beta Blocker: Carvedilol 3.125  - SGLT2: Contraindicated  - Diuretic: Anuric on iHD    CXR BL edema       Plan:  - Volume removal via HD   - SGLT2i not appropriate 2/2 DKA and renal failure  - Daily weights (standing if tolerated)  - Strict I/Os  - Fluid restriction at 1500mL  - Diabetic diet  - Maintain on telemetry and daily EKGs, maintain Mag >2 & K+ >4      Chronic kidney disease-mineral and bone disorder  Per nephro      Chronic hypoxemic respiratory failure  Patient with Hypoxic Respiratory failure which is Acute on chronic.  he is on home oxygen at 5 LPM.  Signs/symptoms of respiratory failure include- tachypnea and increased work of breathing. Contributing diagnoses includes - COPD Labs and images were reviewed. Patient Has recent ABG, which has been reviewed. Will treat underlying causes and adjust management of respiratory failure as follows:  Chest xray with appearance of volume overload; rales on exam.     --Nephrology consulted for HD; volume removal  --BP control; Cardene drip  --Wean FiO2 for SpO2>88%, now on room air  --Home inhalers; PRN nebs      VTE Risk Mitigation (From admission, onward)         Ordered     Reason for no Mechanical VTE Prophylaxis  Once        Question:  Reasons:  Answer:  Physician Provided (leave comment)  Comment:  eliquis    03/31/23 7819      IP VTE HIGH RISK PATIENT  Once         03/31/23 1521     Place sequential compression device  Until discontinued         03/31/23 1515                Discharge Planning   GERA: 4/3/2023     Code Status: Full Code   Is the patient medically ready for discharge?: No    Reason for patient still in hospital (select all that apply): Patient trending condition and Treatment  Discharge Plan A: Nursing Home         Yash De La O MD  Department of Hospital Medicine   [facility] - Intensive Care ([place]-[facility])

## 2023-04-03 NOTE — PLAN OF CARE
Problem: Adult Inpatient Plan of Care  Goal: Plan of Care Review  Outcome: Ongoing, Progressing  Goal: Patient-Specific Goal (Individualized)  Outcome: Ongoing, Progressing  Goal: Absence of Hospital-Acquired Illness or Injury  Outcome: Ongoing, Progressing  Goal: Optimal Comfort and Wellbeing  Outcome: Ongoing, Progressing  Goal: Readiness for Transition of Care  Outcome: Ongoing, Progressing     Problem: Device-Related Complication Risk (Hemodialysis)  Goal: Safe, Effective Therapy Delivery  Outcome: Ongoing, Progressing     Problem: Hemodynamic Instability (Hemodialysis)  Goal: Effective Tissue Perfusion  Outcome: Ongoing, Progressing     Problem: Infection (Hemodialysis)  Goal: Absence of Infection Signs and Symptoms  Outcome: Ongoing, Progressing     Problem: Diabetes Comorbidity  Goal: Blood Glucose Level Within Targeted Range  Outcome: Ongoing, Progressing     Problem: Impaired Wound Healing  Goal: Optimal Wound Healing  Outcome: Ongoing, Progressing     Problem: Skin Injury Risk Increased  Goal: Skin Health and Integrity  Outcome: Ongoing, Progressing

## 2023-04-03 NOTE — ASSESSMENT & PLAN NOTE
Admitted for epistaxis and hyperglycemia. Chest x ray with bilateral edema. Echo on 3/20 EF 55%, CVP 15. Recently discharged 2 days ago for epitaxis and hypertensive urgency requiring Cardene gtt and MICU stay.       Outpatient HD Information:     -Outpatient HD unit: [facility]   -HD tx days: MWF   -HD tx time: 4hrs   -Last HD tx prior to presentation: 3/20/23  -HD access: LUE AVF   -HD modality: iHD   -Residual urine: Anuric        3/27 - UMA AVF with aneurysm. Vascular consulted during most recent hospitalization, ok to continue to use per vascular, recommends not accessing fistula at aneurysmal component and continued evaluation of AVF aneurysm as outpatient.    Plan/Recommendations:    -HD today 4/3 for metabolic clearance and volume management, UF goal 3L  -Strict I/O's and daily weights  -Daily renal function panels   -Renally dose meds

## 2023-04-03 NOTE — PROGRESS NOTES
[facility] - Intensive Care ([place] [address])  Nephrology  Progress Note    Patient Name: Cosme Lewis  MRN: [id number]  Admission Date: 3/31/2023  Hospital Length of Stay: 3 days  Attending Provider: Sheila Kaiser*   Primary Care Physician: Primary Doctor No  Principal Problem:Diabetic ketoacidosis without coma    Subjective:     Interval History:   Patient evaluated while undergoing hemodialysis indicated for ESRD. Tolerating session with current UFR well, no complications. Target UF 3 L. Denies SOB, chest pain, abdominal pain, or muscle cramps.   HD completed on 4/1 w 3 L removed.       Review of patient's allergies indicates:  No Known Allergies  Current Facility-Administered Medications   Medication Frequency    0.9%  NaCl infusion PRN    0.9%  NaCl infusion Once    albuterol-ipratropium 2.5 mg-0.5 mg/3 mL nebulizer solution 3 mL Q4H PRN    atorvastatin tablet 40 mg Daily    carvediloL tablet 6.25 mg BID    dextrose 10 % infusion PRN    dextrose 10 % infusion PRN    dextrose 10% bolus 125 mL 125 mL PRN    dextrose 10% bolus 250 mL 250 mL PRN    dextrose 40 % gel 15,000 mg PRN    dextrose 5 % and 0.45 % NaCl infusion Continuous PRN    epoetin xavier injection 4,000 Units Every Mon, Wed, Fri    FLUoxetine capsule 40 mg Daily    fluticasone furoate-vilanteroL 100-25 mcg/dose diskus inhaler 1 puff Daily    glucagon (human recombinant) injection 1 mg PRN    insulin aspart U-100 pen 0-5 Units QID (AC + HS) PRN    insulin aspart U-100 pen 5 Units TIDWM    insulin aspart U-100 pen 5 Units PRN    insulin detemir U-100 (Levemir) pen 4 Units QHS    insulin detemir U-100 (Levemir) pen 8 Units Daily    isosorbide mononitrate 24 hr tablet 30 mg Daily    lisinopriL tablet 40 mg QHS    melatonin tablet 6 mg Nightly PRN    mupirocin 2 % ointment BID    NIFEdipine 24 hr tablet 60 mg BID    sodium chloride 0.9% flush 10 mL PRN    sodium chloride 0.9% flush 10 mL PRN    sodium chloride 0.9% flush 10 mL  PRN    tiotropium bromide 2.5 mcg/actuation inhaler 2 puff Daily       Objective:     Vital Signs (Most Recent):  Temp: 97.9 °F (36.6 °C) (04/03/23 1122)  Pulse: 65 (04/03/23 1415)  Resp: 18 (04/03/23 1345)  BP: (!) 192/97 (04/03/23 1415)  SpO2: 100 % (04/03/23 1415)   Vital Signs (24h Range):  Temp:  [97.7 °F (36.5 °C)-98.5 °F (36.9 °C)] 97.9 °F (36.6 °C)  Pulse:  [62-70] 65  Resp:  [13-20] 18  SpO2:  [96 %-100 %] 100 %  BP: (139-192)/(64-97) 192/97     Weight: 76.2 kg (168 lb) (03/31/23 1230)  Body mass index is 24.81 kg/m².  Body surface area is 1.93 meters squared.    I/O last 3 completed shifts:  In: 240 [P.O.:240]  Out: 0     Physical Exam  Vitals and nursing note reviewed.   Constitutional:       General: He is not in acute distress.     Appearance: He is ill-appearing.   HENT:      Head: Normocephalic and atraumatic.      Nose:      Comments: Epistaxis     Mouth/Throat:      Mouth: Mucous membranes are moist.      Comments: Scant blood noted on oropharynx  Eyes:      Conjunctiva/sclera: Conjunctivae normal.   Cardiovascular:      Rate and Rhythm: Normal rate and regular rhythm.      Pulses: Normal pulses.      Heart sounds: Normal heart sounds.      Arteriovenous access: Left arteriovenous access is present.     Comments: UMA AVF +thrill   Pulmonary:      Effort: Pulmonary effort is normal. No respiratory distress.   Abdominal:      Palpations: Abdomen is soft.   Musculoskeletal:         General: No swelling.      Cervical back: Neck supple.   Skin:     General: Skin is warm and dry.      Capillary Refill: Capillary refill takes less than 2 seconds.   Neurological:      Mental Status: He is alert and oriented to person, place, and time.   Psychiatric:         Mood and Affect: Mood normal.       Significant Labs:  CBC:   Recent Labs   Lab 04/03/23  0634   WBC 3.69*   RBC 2.49*   HGB 7.4*   HCT 23.4*      MCV 94   MCH 29.7   MCHC 31.6*     CMP:   Recent Labs   Lab 04/03/23  0634   *   CALCIUM  8.1*   ALBUMIN 2.7*   PROT 5.7*   *   K 4.7   CO2 22*      BUN 56*   CREATININE 4.9*   ALKPHOS 167*   ALT 40   AST 43*   BILITOT 0.5     All labs within the past 24 hours have been reviewed.       Assessment/Plan:     Renal/  ESRD on hemodialysis  Admitted for epistaxis and hyperglycemia. Chest x ray with bilateral edema. Echo on 3/20 EF 55%, CVP 15. Recently discharged 2 days ago for epitaxis and hypertensive urgency requiring Cardene gtt and MICU stay.       Outpatient HD Information:     -Outpatient HD unit: [facility]   -HD tx days: MWF   -HD tx time: 4hrs   -Last HD tx prior to presentation: 3/20/23  -HD access: LUE AVF   -HD modality: iHD   -Residual urine: Anuric        3/27 - UMA AVF with aneurysm. Vascular consulted during most recent hospitalization, ok to continue to use per vascular, recommends not accessing fistula at aneurysmal component and continued evaluation of AVF aneurysm as outpatient.    Plan/Recommendations:    -HD today 4/3 for metabolic clearance and volume management, UF goal 3L  -Strict I/O's and daily weights  -Daily renal function panels   -Renally dose meds    Chronic kidney disease-mineral and bone disorder  -continue sevelamer 800 TID meals     Oncology  Anemia in ESRD (end-stage renal disease)  -hgb goal 10-11  epo with HD     Endocrine  * Diabetic ketoacidosis without coma  -management per primary         Thank you for your consult. I will follow-up with patient. Please contact us if you have any additional questions.    Rosi Grant DNP, FNP-C  Nephrology  [facility] - [facility] ([place]

## 2023-04-03 NOTE — PLAN OF CARE
Problem: Device-Related Complication Risk (Hemodialysis)  Goal: Safe, Effective Therapy Delivery  Outcome: Ongoing, Progressing     Problem: Hemodynamic Instability (Hemodialysis)  Goal: Effective Tissue Perfusion  Outcome: Ongoing, Progressing

## 2023-04-03 NOTE — PHYSICIAN QUERY
PT Name: Cosme Lewis  MR #: [id number]     DOCUMENTATION CLARIFICATION     CDS/: Talia Iverson RN             Contact information:[email or web address]  This form is a permanent document in the medical record.     Query Date: April 3, 2023    By submitting this query, we are merely seeking further clarification of documentation to reflect the severity of illness of your patient. Please utilize your independent clinical judgment when addressing the question(s) below.    The Medical Record contains the following:   Indicators   Supporting Clinical Findings Location in Medical Record    Hypertension or hypertensive documented Hypertensive emergency    Hypertensive urgency    Nephrology note 3-23   x Acute/Chronic condition(s) Chronic hypoxemic respiratory failure, Chronic diastolic heart failure, ESRD   H&P   x Vital signs BP: (139-240)/() 214/95 H&P    Lab findings      Radiology findings     x Treatment/Medication Cardene gtt  Continue Carvedilol 6.25 mg BID; hydralazine 100 mg q 8 hours; nifedipine 90 mg QD Nephrology note 3-23    Other       The clinical guidelines noted are only a system guideline. It does not replace the provider's clinical judgment.  Provider, please clarify the conflicting htn diagnosis or diagnoses that correspond(s) to the above indicators:  [  x ] Hypertensive emergency - Severe hypertension (SBP>180 and/or DBP>120mmHg) with signs or symptoms of new or worsening end-organ damage (i.e. hypertensive encephalopathy, retinal hemorrhage, papilledema, acute kidney injury, stroke, heart attack, heart failure, kidney failure)   [   ] Hypertensive urgency - Severe asymptomatic hypertension (SBP>180 and/or DBP>120mmHg) without signs or symptoms of acute end-organ damage. Can have a mild headache.   [   ] Other cardiovascular condition (please specify): __________     Please document in your progress notes daily for the duration of treatment until resolved, and include in  your discharge summary.    References:    ABDULKADIR Arreguin MD, PhD, & NINA Paul MD, FACP, FCCP, FCCM, FR. (2020, June 25). Evaluation and treatment of hypertensive emergencies in adults (KYLE Emanuel MD, ABDULKADIR Aldrich MD, & NINA Chakraborty MD, MSc, Eds.). Retrieved October 22, 2020, from https://www.[employer]/contents/evaluation-and-treatment-ru-zuviszgylyvd-zrgdgbxqmso-in-adults?search=hypertensive%20emergency&source=search_result&selectedTitle=1~150&usage_type=default&display_rank=1    NINA Paul MD, FACP, FCCP, FCCM, FR, & ABDULKADIR Arreguin MD, PhD. (2020, October 14). Management of severe asymptomatic hypertension (hypertensive urgencies) in adults (KYLE Emanuel MD, ABDULKADIR Aldrich MD, & NINA Chakraborty MD, MSc, Eds.). Retrieved October 22, 2020, from https://www.[employer]/contents/management-of-severe-asymptomatic-hypertension-hypertensive-urgencies-in-adults?search=hypertensive%20urgency&source=search_result&selectedTitle=1~41&usage_type=default&display_rank=1    Form No. 90476

## 2023-04-03 NOTE — ASSESSMENT & PLAN NOTE
DKA without coma resolved  -- Also found to have recurrent epistaxis, hgb improved since last to 8.6, hemodynamically stable  -- Pulmonary edema with SpO2 90% on 5 L in s/o COPD on oxygen at NH (unknown amount)    BG now 145, gap closed with bicarb wnl.     Recommendations:  - Long acting insulin 8u/4u morning/night dosage for chronic morning lows  - q4h POCT glucose checks  - Mechanical soft diet  - Dialysis per nephrology and wean O2 as tolerated  - Monitor hgb and hemodynamics

## 2023-04-03 NOTE — NURSING
Pt remains stable. VSS. Educated Pt on medications and procedures. No distress noted.  Will continue to monitor pt. Dialysis performed today.  Remains AAO

## 2023-04-03 NOTE — SUBJECTIVE & OBJECTIVE
Interval History:   Patient evaluated while undergoing hemodialysis indicated for ESRD. Tolerating session with current UFR well, no complications. Target UF 3 L. Denies SOB, chest pain, abdominal pain, or muscle cramps.   HD completed on 4/1 w 3 L removed.       Review of patient's allergies indicates:  No Known Allergies  Current Facility-Administered Medications   Medication Frequency    0.9%  NaCl infusion PRN    0.9%  NaCl infusion Once    albuterol-ipratropium 2.5 mg-0.5 mg/3 mL nebulizer solution 3 mL Q4H PRN    atorvastatin tablet 40 mg Daily    carvediloL tablet 6.25 mg BID    dextrose 10 % infusion PRN    dextrose 10 % infusion PRN    dextrose 10% bolus 125 mL 125 mL PRN    dextrose 10% bolus 250 mL 250 mL PRN    dextrose 40 % gel 15,000 mg PRN    dextrose 5 % and 0.45 % NaCl infusion Continuous PRN    epoetin xavier injection 4,000 Units Every Mon, Wed, Fri    FLUoxetine capsule 40 mg Daily    fluticasone furoate-vilanteroL 100-25 mcg/dose diskus inhaler 1 puff Daily    glucagon (human recombinant) injection 1 mg PRN    insulin aspart U-100 pen 0-5 Units QID (AC + HS) PRN    insulin aspart U-100 pen 5 Units TIDWM    insulin aspart U-100 pen 5 Units PRN    insulin detemir U-100 (Levemir) pen 4 Units QHS    insulin detemir U-100 (Levemir) pen 8 Units Daily    isosorbide mononitrate 24 hr tablet 30 mg Daily    lisinopriL tablet 40 mg QHS    melatonin tablet 6 mg Nightly PRN    mupirocin 2 % ointment BID    NIFEdipine 24 hr tablet 60 mg BID    sodium chloride 0.9% flush 10 mL PRN    sodium chloride 0.9% flush 10 mL PRN    sodium chloride 0.9% flush 10 mL PRN    tiotropium bromide 2.5 mcg/actuation inhaler 2 puff Daily       Objective:     Vital Signs (Most Recent):  Temp: 97.9 °F (36.6 °C) (04/03/23 1122)  Pulse: 65 (04/03/23 1415)  Resp: 18 (04/03/23 1345)  BP: (!) 192/97 (04/03/23 1415)  SpO2: 100 % (04/03/23 1415)   Vital Signs (24h Range):  Temp:  [97.7 °F (36.5 °C)-98.5 °F (36.9 °C)] 97.9 °F (36.6  °C)  Pulse:  [62-70] 65  Resp:  [13-20] 18  SpO2:  [96 %-100 %] 100 %  BP: (139-192)/(64-97) 192/97     Weight: 76.2 kg (168 lb) (03/31/23 1230)  Body mass index is 24.81 kg/m².  Body surface area is 1.93 meters squared.    I/O last 3 completed shifts:  In: 240 [P.O.:240]  Out: 0     Physical Exam  Vitals and nursing note reviewed.   Constitutional:       General: He is not in acute distress.     Appearance: He is ill-appearing.   HENT:      Head: Normocephalic and atraumatic.      Nose:      Comments: Epistaxis     Mouth/Throat:      Mouth: Mucous membranes are moist.      Comments: Scant blood noted on oropharynx  Eyes:      Conjunctiva/sclera: Conjunctivae normal.   Cardiovascular:      Rate and Rhythm: Normal rate and regular rhythm.      Pulses: Normal pulses.      Heart sounds: Normal heart sounds.      Arteriovenous access: Left arteriovenous access is present.     Comments: UMA AVF +thrill   Pulmonary:      Effort: Pulmonary effort is normal. No respiratory distress.   Abdominal:      Palpations: Abdomen is soft.   Musculoskeletal:         General: No swelling.      Cervical back: Neck supple.   Skin:     General: Skin is warm and dry.      Capillary Refill: Capillary refill takes less than 2 seconds.   Neurological:      Mental Status: He is alert and oriented to person, place, and time.   Psychiatric:         Mood and Affect: Mood normal.       Significant Labs:  CBC:   Recent Labs   Lab 04/03/23  0634   WBC 3.69*   RBC 2.49*   HGB 7.4*   HCT 23.4*      MCV 94   MCH 29.7   MCHC 31.6*     CMP:   Recent Labs   Lab 04/03/23  0634   *   CALCIUM 8.1*   ALBUMIN 2.7*   PROT 5.7*   *   K 4.7   CO2 22*      BUN 56*   CREATININE 4.9*   ALKPHOS 167*   ALT 40   AST 43*   BILITOT 0.5     All labs within the past 24 hours have been reviewed.

## 2023-04-03 NOTE — PROGRESS NOTES
Patient came in [facility] via bed. AAOx4. Hemodialysis started via LUE AV fistula cannulated with gauge 15 needles without issues.

## 2023-04-04 NOTE — PHYSICIAN QUERY
PT Name: Cosme Lewis  MR #: [id number]     DOCUMENTATION CLARIFICATION     CDS/: Ute Arce RN CDS            Contact information:[email or web address]  This form is a permanent document in the medical record.     Query Date: April 4, 2023    By submitting this query, we are merely seeking further clarification of documentation to reflect the severity of illness of your patient. Please utilize your independent clinical judgment when addressing the question(s) below.    The medical record reflects the following:     Indicators   Supporting Clinical Findings Location in Medical Record     X Diabetes documented  Cosme Lewis is a 57 y.o. male with   ESRD on HD on MWF, DM1      Past Medical History:    Diabetes mellitus type 1      57M, [facility], w/ ESRD on HD, uncontrolled DM2 ED, Dr. García, 3/31        H&P, Dr. Gill/Dr. Kaiser, 3/31     X Lab Value(s), POCT glucose value(s)   03/31 14:52 03/31 19:59 03/31 22:00   Glucose       784         557          366      03/31 23:54 04/01 07:39 04/02 03:35   Glucose        210         92             41       Labs, 3/31        Labs, 3/31-4/2     X Beta-OHxy Butyric Acid levels Beta-hydroxybutyrate  1.1   Labs, 3/31    Serum Osmolarity       X pH POC PH  7.329 Labs, 3/31     X Anion gap/ Bicarb levels   03/31 12:18 03/31 12:59 03/31 19:59   Anion Gap  17 (H) 15 16         03/31  19:59 03/31 23:54 04/01  07:39 04/02 03:35   CO2  21 (L) 18 (L) 23  24 25    Labs, 3/31              Labs, 3/31-4/2     X Treatment/Medication  Insulin gtt per DKA protocol     Dialysis per nephrology and wean O2 as tolerated     Monitor hgb and hemodynamics   H&P, Dr. Gill/Dr. Kaiser, 3/31     X Other  Denies missing doses of insulin or oral antihyperglycemics as he lives at nursing home and they administer all of his medications regularly.   H&P, Dr. Gill/Dr. Kaiser, 3/31     Provider, please clarify the Diabetes diagnosis.    [  x ] Diabetes mellitus Type 1    [   ] Diabetes mellitus  Type 2   [   ] Other diabetes diagnosis (please specify): ____________   [   ]  Clinically Undetermined     Please document in your progress notes daily for the duration of treatment until resolved, and include in your discharge summary.

## 2023-04-04 NOTE — SUBJECTIVE & OBJECTIVE
Interval History: Pt concerned about returning to nursing home. Pt feels comfortable with the care we are giving him and is concerned about not being checked on as frequently. No other concerns or complaints.     Review of Systems   Constitutional:  Negative for activity change, appetite change, chills, fatigue, fever and unexpected weight change.   HENT:  Negative for trouble swallowing and voice change.    Eyes:  Positive for redness. Negative for pain and visual disturbance.   Respiratory:  Negative for cough, chest tightness, shortness of breath and wheezing.    Cardiovascular:  Negative for chest pain, palpitations and leg swelling.   Gastrointestinal:  Negative for abdominal distention, abdominal pain, anal bleeding, blood in stool, constipation, diarrhea, nausea, rectal pain and vomiting.   Endocrine: Negative.    Genitourinary: Negative.    Musculoskeletal:  Negative for back pain, neck pain and neck stiffness.   Skin:  Negative for pallor, rash and wound.   Allergic/Immunologic: Negative.    Neurological:  Negative for dizziness, seizures, syncope, weakness, light-headedness, numbness and headaches.   Hematological: Negative.    Psychiatric/Behavioral:  Negative for confusion and sleep disturbance. The patient is not nervous/anxious.    All other systems reviewed and are negative.  Objective:     Vital Signs (Most Recent):  Temp: 97.7 °F (36.5 °C) (04/04/23 1135)  Pulse: 70 (04/04/23 1135)  Resp: 18 (04/04/23 1135)  BP: (!) 149/90 (04/04/23 1135)  SpO2: (!) 94 % (04/04/23 1135)   Vital Signs (24h Range):  Temp:  [97.6 °F (36.4 °C)-98.1 °F (36.7 °C)] 97.7 °F (36.5 °C)  Pulse:  [63-72] 70  Resp:  [16-21] 18  SpO2:  [94 %-100 %] 94 %  BP: (149-175)/(75-93) 149/90     Weight: 76.2 kg (168 lb)  Body mass index is 24.81 kg/m².    Intake/Output Summary (Last 24 hours) at 4/4/2023 1515  Last data filed at 4/4/2023 1200  Gross per 24 hour   Intake 118 ml   Output 3650 ml   Net -3532 ml      Physical Exam  Vitals and  nursing note reviewed.   Constitutional:       General: He is awake. He is not in acute distress.     Appearance: He is ill-appearing. He is not diaphoretic.      Interventions: Nasal cannula in place.   HENT:      Head: Normocephalic and atraumatic.      Right Ear: External ear normal.      Left Ear: External ear normal.      Nose: Nose normal.      Mouth/Throat:      Mouth: Mucous membranes are dry.      Pharynx: Oropharynx is clear. No oropharyngeal exudate or posterior oropharyngeal erythema.      Comments: Poor dentition.  Eyes:      General: No scleral icterus.        Right eye: No discharge.         Left eye: No discharge.      Extraocular Movements: Extraocular movements intact.      Comments: Left conjunctival hemorrhage noted.    Cardiovascular:      Rate and Rhythm: Normal rate.      Heart sounds: Murmur heard.   Systolic murmur is present with a grade of 1/6.     No friction rub. No gallop.      Arteriovenous access: Left arteriovenous access is present.     Comments: Left upper extremity AVF with palpable thrill and audible bruit.  Pulmonary:      Effort: Pulmonary effort is normal. No respiratory distress.      Breath sounds: No wheezing, rhonchi or rales.   Abdominal:      General: Bowel sounds are normal. There is no distension.      Palpations: Abdomen is soft.      Tenderness: There is no abdominal tenderness.   Musculoskeletal:      Cervical back: Neck supple.      Right lower leg: No edema.      Left lower leg: No edema.   Skin:     General: Skin is warm and dry.      Coloration: Skin is not jaundiced.      Findings: Bruising present.   Neurological:      General: No focal deficit present.      Mental Status: He is alert. Mental status is at baseline.      Cranial Nerves: No cranial nerve deficit.      Comments: Grossly hard of hearing.   Psychiatric:         Mood and Affect: Mood normal.         Behavior: Behavior normal. Behavior is cooperative.       Significant Labs: All pertinent labs within  the past 24 hours have been reviewed.    Significant Imaging: I have reviewed all pertinent imaging results/findings within the past 24 hours.

## 2023-04-04 NOTE — DISCHARGE SUMMARY
[facility] - Intensive Care ([place] [address])  [facility] Medicine  Discharge Summary      Patient Name: Cosme Lewis  MRN: [id number]  MARCY: [id number]  Patient Class: IP- Inpatient  Admission Date: 3/31/2023  Hospital Length of Stay: 4 days  Discharge Date and Time:  04/04/2023 3:18 PM  Attending Physician: Sheila Kaiser*   Discharging Provider: Yash De La O MD  Primary Care Provider: Primary [facility] Medicine Team: [facility] Yash De La O MD  Primary Care Team: [facility]    HPI:   Cosme Lewis is a 57 y.o. M with history of ESRD on HD, DM, HTN, COPD who presents with epistaxis. Was recently hospitalized for epistaxis and HTN urgency and discharged 3/29/23, required MICU on cardene gtt. Reports epistaxis never resolved. Denies dizziness or syncope. Denies hematuria, melena, hematochezia. Went to dialysis today and before could be dialyzed, was sent to ED due to epistaxis. On arrival, glu on istat >700. Other labs pending. Reports eating candies, possibly including two bags of skittles today. Denies missing doses of insulin or oral antihyperglycemics as he lives at nursing home and they administer all of his medications regularly. Hgb 8.6, up from 6.6 two days ago. Hemodynamically stable. SpO2 90% on 5 L O2. Does have COPD. CXR reveals pulmonary edema. Plan for dialysis today. Patient admitted to [facility] for management of DKA.       * No surgery found *      Hospital Course:   Pt admitted for DKA and was placed on insulin infusion. Overnight pts BG was under 100 with infusion stopped. Placed on D5 half normal with 20 KCL for the rest of the night. Morning labs showed closed gap with normalizing bicarb. BG now within range at 145. Switched back to normal home regimen with monitored food intake. Plan to discharge back to nursing home if BG remain within acceptable range. Pt not hypertensive during admission, but was slightly dyspneic. Likely needing repeat dialysis once discharged. Pt having  significant instability in blood sugar readings. Pt down to 44 overnight with new abd pain and diarrhea. After small correction with juice and breakfast patient up to 199. Will continue to titrate insulin regimen. Scheduled to receive additional dialysis session before discharge.      Interval History: Pt concerned about returning to nursing home. Pt feels comfortable with the care we are giving him and is concerned about not being checked on as frequently. No other concerns or complaints.     Review of Systems   Constitutional:  Negative for activity change, appetite change, chills, fatigue, fever and unexpected weight change.   HENT:  Negative for trouble swallowing and voice change.    Eyes:  Positive for redness. Negative for pain and visual disturbance.   Respiratory:  Negative for cough, chest tightness, shortness of breath and wheezing.    Cardiovascular:  Negative for chest pain, palpitations and leg swelling.   Gastrointestinal:  Negative for abdominal distention, abdominal pain, anal bleeding, blood in stool, constipation, diarrhea, nausea, rectal pain and vomiting.   Endocrine: Negative.    Genitourinary: Negative.    Musculoskeletal:  Negative for back pain, neck pain and neck stiffness.   Skin:  Negative for pallor, rash and wound.   Allergic/Immunologic: Negative.    Neurological:  Negative for dizziness, seizures, syncope, weakness, light-headedness, numbness and headaches.   Hematological: Negative.    Psychiatric/Behavioral:  Negative for confusion and sleep disturbance. The patient is not nervous/anxious.    All other systems reviewed and are negative.  Objective:     Vital Signs (Most Recent):  Temp: 97.7 °F (36.5 °C) (04/04/23 1135)  Pulse: 70 (04/04/23 1135)  Resp: 18 (04/04/23 1135)  BP: (!) 149/90 (04/04/23 1135)  SpO2: (!) 94 % (04/04/23 1135)   Vital Signs (24h Range):  Temp:  [97.6 °F (36.4 °C)-98.1 °F (36.7 °C)] 97.7 °F (36.5 °C)  Pulse:  [63-72] 70  Resp:  [16-21] 18  SpO2:  [94 %-100 %]  94 %  BP: (149-175)/(75-93) 149/90     Weight: 76.2 kg (168 lb)  Body mass index is 24.81 kg/m².    Intake/Output Summary (Last 24 hours) at 4/4/2023 1515  Last data filed at 4/4/2023 1200  Gross per 24 hour   Intake 118 ml   Output 3650 ml   Net -3532 ml      Physical Exam  Vitals and nursing note reviewed.   Constitutional:       General: He is awake. He is not in acute distress.     Appearance: He is ill-appearing. He is not diaphoretic.      Interventions: Nasal cannula in place.   HENT:      Head: Normocephalic and atraumatic.      Right Ear: External ear normal.      Left Ear: External ear normal.      Nose: Nose normal.      Mouth/Throat:      Mouth: Mucous membranes are dry.      Pharynx: Oropharynx is clear. No oropharyngeal exudate or posterior oropharyngeal erythema.      Comments: Poor dentition.  Eyes:      General: No scleral icterus.        Right eye: No discharge.         Left eye: No discharge.      Extraocular Movements: Extraocular movements intact.      Comments: Left conjunctival hemorrhage noted.    Cardiovascular:      Rate and Rhythm: Normal rate.      Heart sounds: Murmur heard.   Systolic murmur is present with a grade of 1/6.     No friction rub. No gallop.      Arteriovenous access: Left arteriovenous access is present.     Comments: Left upper extremity AVF with palpable thrill and audible bruit.  Pulmonary:      Effort: Pulmonary effort is normal. No respiratory distress.      Breath sounds: No wheezing, rhonchi or rales.   Abdominal:      General: Bowel sounds are normal. There is no distension.      Palpations: Abdomen is soft.      Tenderness: There is no abdominal tenderness.   Musculoskeletal:      Cervical back: Neck supple.      Right lower leg: No edema.      Left lower leg: No edema.   Skin:     General: Skin is warm and dry.      Coloration: Skin is not jaundiced.      Findings: Bruising present.   Neurological:      General: No focal deficit present.      Mental Status: He is  alert. Mental status is at baseline.      Cranial Nerves: No cranial nerve deficit.      Comments: Grossly hard of hearing.   Psychiatric:         Mood and Affect: Mood normal.         Behavior: Behavior normal. Behavior is cooperative.       Significant Labs: All pertinent labs within the past 24 hours have been reviewed.    Significant Imaging: I have reviewed all pertinent imaging results/findings within the past 24 hours.    Goals of Care Treatment Preferences:  Code Status: Full Code      Consults:   Consults (From admission, onward)          Status Ordering Provider     Inpatient consult to Critical Care Medicine  Once        Provider:  (Not yet assigned)    Completed CALIXTO NAIR     Inpatient consult to Nephrology  Once        Provider:  (Not yet assigned)    Completed CALIXTO NAIR            No new Assessment & Plan notes have been filed under this hospital service since the last note was generated.  Service: Hospital Medicine    Final Active Diagnoses:    Diagnosis Date Noted POA    PRINCIPAL PROBLEM:  Diabetic ketoacidosis without coma [E11.10] 12/20/2022 Unknown    ESRD on hemodialysis [N18.6, Z99.2] 03/31/2023 Not Applicable    COPD (chronic obstructive pulmonary disease) [J44.9] 03/15/2023 Yes    Anemia in ESRD (end-stage renal disease) [N18.6, D63.1] 11/18/2022 Yes     Chronic    Chronic kidney disease-mineral and bone disorder [N18.9, E83.9, M89.9] 10/12/2022 Yes    Chronic hypoxemic respiratory failure [J96.11] 10/12/2022 Yes     Chronic    Chronic diastolic heart failure [I50.32] 10/12/2022 Yes     Chronic      Problems Resolved During this Admission:       Discharged Condition: good    Disposition: longterm Nursing Home    Follow Up:    Patient Instructions:   No discharge procedures on file.    Significant Diagnostic Studies: Labs:   BMP:   Recent Labs   Lab 04/03/23  0634 04/04/23  0545   * 118*   * 134*   K 4.7 4.1    102   CO2 22* 22*   BUN 56* 28*   CREATININE 4.9*  3.1*   CALCIUM 8.1* 8.2*   MG 2.1 1.9       Pending Diagnostic Studies:       None           Medications:  Reconciled Home Medications:      Medication List        CHANGE how you take these medications      atorvastatin 40 MG tablet  Commonly known as: LIPITOR  Take 1 tablet (40 mg total) by mouth once daily.  What changed: when to take this     erythromycin ophthalmic ointment  Commonly known as: ROMYCIN  Place a 1/2 inch ribbon of ointment into the lower eyelid three times a day.  What changed: additional instructions     * insulin detemir U-100 (Levemir) 100 unit/mL (3 mL) Inpn pen  Inject 4 Units into the skin every evening.  What changed: You were already taking a medication with the same name, and this prescription was added. Make sure you understand how and when to take each.     * insulin detemir U-100 (Levemir) 100 unit/mL (3 mL) Inpn pen  Inject 8 Units into the skin once daily.  Start taking on: April 5, 2023  What changed: when to take this     insulin lispro 100 unit/mL pen  Commonly known as: HumaLOG KwikPen Insulin  Inject 8 Units into the skin 3 (three) times daily before meals.  What changed: additional instructions     vits A and D-white pet-lanolin ointment  Apply topically 2 (two) times daily. Apply twice daily to penis  What changed:   how to take this  when to take this     white petrolatum ointment  Commonly known as: VASELINE  Apply topically once daily. Apply small amount to both nostrils daily  What changed:   how to take this  when to take this           * This list has 2 medication(s) that are the same as other medications prescribed for you. Read the directions carefully, and ask your doctor or other care provider to review them with you.                CONTINUE taking these medications      acetaminophen 650 MG Tbsr  Commonly known as: TYLENOL  Take 650 mg by mouth every 8 (eight) hours as needed (pain or fever over 100.4).     albuterol 90 mcg/actuation inhaler  Commonly known as:  PROVENTIL/VENTOLIN HFA  Inhale 2 puffs into the lungs 4 (four) times daily as needed (breathing).     albuterol-ipratropium 2.5 mg-0.5 mg/3 mL nebulizer solution  Commonly known as: DUO-NEB  Take 3 mLs by nebulization every 4 (four) hours while awake. Rescue     apixaban 5 mg Tab  Commonly known as: ELIQUIS  Take 5 mg by mouth 2 (two) times daily.     artificial tears 0.5 % ophthalmic solution  Commonly known as: ISOPTO TEARS  Place 1 drop into both eyes 6 (six) times daily.     aspirin 81 MG EC tablet  Commonly known as: ECOTRIN  Take 81 mg by mouth once daily.     carvediloL 3.125 MG tablet  Commonly known as: COREG  Take 2 tablets (6.25 mg total) by mouth 2 (two) times daily.     cetirizine 10 MG tablet  Commonly known as: ZYRTEC  Take 10 mg by mouth daily as needed (itching).     epoetin xavier 10,000 unit/mL injection  Commonly known as: PROCRIT  Inject 0.7 mLs (7,000 Units total) into the skin every Tues, Thurs, Sat.     FLUoxetine 40 MG capsule  Take 40 mg by mouth once daily.     fluticasone-salmeterol 250-50 mcg/dose 250-50 mcg/dose diskus inhaler  Commonly known as: ADVAIR  Inhale 1 puff into the lungs 2 (two) times daily.     GLUCAGON EMERGENCY KIT (HUMAN) 1 mg Solr  Generic drug: glucagon  1 mg into the muscle as needed if CBG < 70     HYDROPHILIC CREAM TOP  Apply liberal amount to affected area twice daily for dry skin     isosorbide mononitrate 30 MG 24 hr tablet  Commonly known as: IMDUR  Take 1 tablet (30 mg total) by mouth once daily.     lisinopriL 40 MG tablet  Commonly known as: PRINIVIL,ZESTRIL  Take 1 tablet (40 mg total) by mouth every evening.     melatonin 3 mg Tbdl  Take 9 mg by mouth nightly as needed (sleep).     NEPRO CARB STEADY ORAL  Give 1 carton by mouth with each meal.     NIFEdipine 60 MG (OSM) 24 hr tablet  Commonly known as: PROCARDIA-XL  Take 1 tablet (60 mg total) by mouth 2 (two) times a day.     ondansetron 8 MG tablet  Commonly known as: ZOFRAN  Take 1 tablet by mouth 3  (three) times daily as needed for Nausea.     sevelamer carbonate 800 mg Tab  Commonly known as: RENVELA  Take 800 mg by mouth 3 (three) times daily.     sodium chloride 0.65 % nasal spray  Commonly known as: Saline NasaL  1 spray by Nasal route as needed (dry nostril).     tiotropium bromide 2.5 mcg/actuation inhaler  Commonly known as: SPIRIVA RESPIMAT  Inhale 2 puffs into the lungs Daily.     triamcinolone acetonide 0.025% 0.025 % cream  Commonly known as: KENALOG  Apply topically 2 (two) times daily.     vitamin renal formula (B-complex-vitamin c-folic acid) 1 mg Cap  Commonly known as: NEPHROCAP  Take 1 capsule by mouth once daily.              Indwelling Lines/Drains at time of discharge:   Lines/Drains/Airways       Drain  Duration                  Hemodialysis AV Fistula Left upper arm -- days                    Time spent on the discharge of patient: 40 minutes         Yash De La O MD  Department of Hospital Medicine  [facility] - Intensive Care ([place]-14)

## 2023-04-04 NOTE — PLAN OF CARE
Problem: Adult Inpatient Plan of Care  Goal: Plan of Care Review  Outcome: Ongoing, Progressing     Problem: Device-Related Complication Risk (Hemodialysis)  Goal: Safe, Effective Therapy Delivery  Outcome: Ongoing, Progressing     Problem: Diabetes Comorbidity  Goal: Blood Glucose Level Within Targeted Range  Outcome: Ongoing, Progressing  Intervention: Monitor and Manage Glycemia  Flowsheets (Taken 4/4/2023 3334)  Glycemic Management:   blood glucose monitored   supplemental insulin given     Problem: Skin Injury Risk Increased  Goal: Skin Health and Integrity  Outcome: Ongoing, Progressing

## 2023-04-04 NOTE — PLAN OF CARE
Nick [facility]ar - Intensive Care ([facility]-14)  Discharge Final Note    Primary Care Provider: Primary Doctor No    Expected Discharge Date: 4/4/2023    Final Discharge Note (most recent)       Final Note - 04/04/23 1346          Final Note    Assessment Type Final Discharge Note (P)      Anticipated Discharge Disposition prison Nursing Home (P)                      Important Message from Medicare  Important Message from Medicare regarding Discharge Appeal Rights: Explained to patient/caregiver, Signed/date by patient/caregiver     Date IMM was signed: 04/04/23  Time IMM was signed: 1340    Patient is discharging today to [facility] .  has arranged transport for 2:30pm. Nurse call report to [phone].      Future Appointments   Date Time Provider Department Center   4/11/2023  9:30 AM Irene Peng NP [facility] Nick [facility] PCW   4/27/2023  9:00 AM Ela Quiroz MD [facility]   5/15/2023  9:00 AM Lauri Pimentel MD [facility] Nick Metz LMSW  [facility]   [id number]

## 2023-04-04 NOTE — PLAN OF CARE
NURSING HOME ORDERS    04/04/2023  [facility]  [facility] - INTENSIVE CARE ([place])  [address]  [facility] [address]  Dept: [phone]  Loc: [phone]     Admit to Nursing Home:      Diagnoses:  Active Hospital Problems    Diagnosis  POA    *Diabetic ketoacidosis without coma [E11.10]  Unknown    ESRD on hemodialysis [N18.6, Z99.2]  Not Applicable    COPD (chronic obstructive pulmonary disease) [J44.9]  Yes    Anemia in ESRD (end-stage renal disease) [N18.6, D63.1]  Yes     Chronic    Chronic kidney disease-mineral and bone disorder [N18.9, E83.9, M89.9]  Yes    Chronic hypoxemic respiratory failure [J96.11]  Yes     Chronic    Chronic diastolic heart failure [I50.32]  Yes     Chronic      Resolved Hospital Problems   No resolved problems to display.       Patient is homebound due to:  Diabetic ketoacidosis without coma    Allergies:Review of patient's allergies indicates:  No Known Allergies    Vitals:  Routine    Diet: cardiac diet    Activities:   Activity as tolerated    Goals of Care Treatment Preferences:  Code Status: Full Code      Labs:  CMP    Nursing Precautions:  none    Consults:   PT to evaluate and treat- 1 times a week, OT to evaluate and treat- 1 times a week, and Nutrition to evaluate and recommend diet     Miscellaneous Care: Diabetes Care: Diabetes: Check blood sugar. Fingerstick blood sugar AC and HS  Sliding Scale/Hypoglycemia Protocol: For FSG<80, give 1 amp D50 or 1 glucose tablet. For FSG , do nothing. For -200, give 1 unit of novolog in addition to pre-meal insulin. For -250, give 2 units of novolog in addition to pre-meal insulin. For -300, give 3 units of novolog in addition to pre-meal insulin. For 301-350, give 4 units of novolog in addition to pre-meal insulin. For 351-400, give 5 units of novolog in addition to pre-meal insulin. For FSG >400, give 5 units of novolog in addition to pre-meal insulin and please call  MD  CHF Care: Daily Weight with notification of MD/NP of 2lb or > increase in 24 hours    v/s and O2 sat every shift    Oxygen as needed for sats <90%    Report abnormal breath sounds to MD/NP    Edema checks q shift- notify MD/NP of increased edema    Task segmentation by nursing for daily care to decrease exertion    CHF education to include diet ,medication, and CHF flags for MD notification                   Diabetes Care:  SN to perform and educate Diabetic management with blood glucose monitoring:, Fingerstick blood sugar AC and HS, and Report CBG < 60 or > 350 to physician.      Medications: Discontinue all previous medication orders, if any. See new list below.     Medication List        ASK your doctor about these medications      acetaminophen 650 MG Tbsr  Commonly known as: TYLENOL  Take 650 mg by mouth every 8 (eight) hours as needed (pain or fever over 100.4).     albuterol 90 mcg/actuation inhaler  Commonly known as: PROVENTIL/VENTOLIN HFA  Inhale 2 puffs into the lungs 4 (four) times daily as needed (breathing).     albuterol-ipratropium 2.5 mg-0.5 mg/3 mL nebulizer solution  Commonly known as: DUO-NEB  Take 3 mLs by nebulization every 4 (four) hours while awake. Rescue     apixaban 5 mg Tab  Commonly known as: ELIQUIS  Take 5 mg by mouth 2 (two) times daily.     artificial tears 0.5 % ophthalmic solution  Commonly known as: ISOPTO TEARS  Place 1 drop into both eyes 6 (six) times daily.     aspirin 81 MG EC tablet  Commonly known as: ECOTRIN  Take 81 mg by mouth once daily.     atorvastatin 40 MG tablet  Commonly known as: LIPITOR  Take 1 tablet (40 mg total) by mouth once daily.     carvediloL 3.125 MG tablet  Commonly known as: COREG  Take 2 tablets (6.25 mg total) by mouth 2 (two) times daily.     cetirizine 10 MG tablet  Commonly known as: ZYRTEC  Take 10 mg by mouth daily as needed (itching).     epoetin xavier 10,000 unit/mL injection  Commonly known as: PROCRIT  Inject 0.7 mLs (7,000 Units total)  into the skin every Tues, Thurs, Sat.     erythromycin ophthalmic ointment  Commonly known as: ROMYCIN  Place a 1/2 inch ribbon of ointment into the lower eyelid three times a day.     FLUoxetine 40 MG capsule  Take 40 mg by mouth once daily.     fluticasone-salmeterol 250-50 mcg/dose 250-50 mcg/dose diskus inhaler  Commonly known as: ADVAIR  Inhale 1 puff into the lungs 2 (two) times daily.     GLUCAGON EMERGENCY KIT (HUMAN) 1 mg Solr  Generic drug: glucagon  1 mg into the muscle as needed if CBG < 70     HYDROPHILIC CREAM TOP  Apply liberal amount to affected area twice daily for dry skin     insulin detemir U-100 (Levemir) 100 unit/mL (3 mL) Inpn pen  Inject 8 Units into the skin 2 (two) times daily.     insulin lispro 100 unit/mL pen  Commonly known as: HumaLOG KwikPen Insulin  Inject 8 Units into the skin 3 (three) times daily before meals.     isosorbide mononitrate 30 MG 24 hr tablet  Commonly known as: IMDUR  Take 1 tablet (30 mg total) by mouth once daily.     lisinopriL 40 MG tablet  Commonly known as: PRINIVIL,ZESTRIL  Take 1 tablet (40 mg total) by mouth every evening.     melatonin 3 mg Tbdl  Take 9 mg by mouth nightly as needed (sleep).     NEPRO CARB STEADY ORAL  Give 1 carton by mouth with each meal.     NIFEdipine 60 MG (OSM) 24 hr tablet  Commonly known as: PROCARDIA-XL  Take 1 tablet (60 mg total) by mouth 2 (two) times a day.     ondansetron 8 MG tablet  Commonly known as: ZOFRAN  Take 1 tablet by mouth 3 (three) times daily as needed for Nausea.     sevelamer carbonate 800 mg Tab  Commonly known as: RENVELA  Take 800 mg by mouth 3 (three) times daily.     sodium chloride 0.65 % nasal spray  Commonly known as: Saline NasaL  1 spray by Nasal route as needed (dry nostril).     tiotropium bromide 2.5 mcg/actuation inhaler  Commonly known as: SPIRIVA RESPIMAT  Inhale 2 puffs into the lungs Daily.     triamcinolone acetonide 0.025% 0.025 % cream  Commonly known as: KENALOG  Apply topically 2 (two)  times daily.     vitamin renal formula (B-complex-vitamin c-folic acid) 1 mg Cap  Commonly known as: NEPHROCAP  Take 1 capsule by mouth once daily.     vits A and D-white pet-lanolin ointment  Apply topically 2 (two) times daily. Apply twice daily to penis     white petrolatum ointment  Commonly known as: VASELINE  Apply topically once daily. Apply small amount to both nostrils daily                Immunizations Administered as of 4/4/2023       Name Date Dose VIS Date Route Exp Date    COVID-19, MRNA, LN-S, PF (Moderna) 1/27/2022 -- -- -- --    COVID-19, MRNA, LN-S, PF (Pfizer) (Purple Cap) 3/2/2021 -- -- -- --    : Pf[employer] Inc     Lot: [id number]     COVID-19, MRNA, LN-S, PF (Pfizer) (Purple Cap) 2/9/2021 -- -- -- --    : Pf[employer] Inc     Lot: [id number]             This patient has had both covid vaccinations    Some patients may experience side effects after vaccination.  These may include fever, headache, muscle or joint aches.  Most symptoms resolve with 24-48 hours and do not require urgent medical evaluation unless they persist for more than 72 hours or symptoms are concerning for an unrelated medical condition.          _________________________________  Yash De La O MD  04/04/2023

## 2023-04-04 NOTE — PLAN OF CARE
Patient being discharged back to [facility]. Transportation arranged. Report called.   Problem: Adult Inpatient Plan of Care  Goal: Plan of Care Review  Outcome: Met  Goal: Patient-Specific Goal (Individualized)  Outcome: Met  Goal: Absence of Hospital-Acquired Illness or Injury  Outcome: Met  Goal: Optimal Comfort and Wellbeing  Outcome: Met  Goal: Readiness for Transition of Care  Outcome: Met     Problem: Device-Related Complication Risk (Hemodialysis)  Goal: Safe, Effective Therapy Delivery  Outcome: Met     Problem: Hemodynamic Instability (Hemodialysis)  Goal: Effective Tissue Perfusion  Outcome: Met     Problem: Infection (Hemodialysis)  Goal: Absence of Infection Signs and Symptoms  Outcome: Met     Problem: Diabetes Comorbidity  Goal: Blood Glucose Level Within Targeted Range  Outcome: Met     Problem: Impaired Wound Healing  Goal: Optimal Wound Healing  Outcome: Met     Problem: Skin Injury Risk Increased  Goal: Skin Health and Integrity  Outcome: Met

## 2023-04-04 NOTE — NURSING
"Pt is AAOx4. VSS. On 3L NC. NSR on BSM. Dialysis pt. Pt continuously asks for snacks or "something sweet." Pt educated on diabetes and high blood sugars. Still continue to ask, education unsuccessful. Uses bedpan. NADN. Call light and personal items in reach. Safety maintained. Will continue to monitor.   "

## 2023-05-02 NOTE — TELEPHONE ENCOUNTER
----- Message from Kirsten Valdez sent at 5/2/2023 11:13 AM CDT -----  Regarding: Appointment  Contact: Ms Rochelle benjamin/[facility] [phone]  Calling to schedule appointment per referral as soon as possible. Please call and schedule with Ms Byrd on a Tuesday or Thursday.

## 2023-05-02 NOTE — PROGRESS NOTES
Subjective:      Patient ID: Cosme Lewis is a 57 y.o. male.    Chief Complaint:  No chief complaint on file.    History of Present Illness  Cosme Lewis is here for follow up of DM.  Previously seen by inpatient endocrine team.  Last seen 1/2023.  This is their first visit with me.      Living at [facility] - has been there a few months.     Arrives with nursing aid from [facility].     [facility]    With regards to diabetes:    Diagnosed: 2018   Latest Reference Range & Units 01/05/23 15:06   Glucose 70 - 110 mg/dL  70 - 110 mg/dL 157 (H)  157 (H)   C-Peptide 0.78 - 5.19 ng/mL  0.78 - 5.19 ng/mL 0.09 (L)  0.09 (L)   (H): Data is abnormally high  (L): Data is abnormally low    Known complications:  DKA + most recent 3/2023   RN +  Eye Exam: 3/2023  PN Denies None   Podiatry: None  Nephropathy + ESRD on Dialysis MWF  CAD +     Current regimen: per chart review from [facility] 8 units twice daily   Humalog 8 units before meals     Other medications tried:  None    Glucose Monitor:   4 times a day testing  Log reviewed: none     Hypoglycemia:  Denies signs or symptoms.   Knows how to correct with 15 grams of carbs- juice, coke, or a peppermint.     Diet/Exercise:  Eats 3 meals a day.   Snacks Denies  Drinks: water, juice  Exercise: None.     Diabetes Management Status    Hemoglobin A1C   Date Value Ref Range Status   03/06/2023 9.5 (H) 4.0 - 5.6 % Final     Comment:     ADA Screening Guidelines:  5.7-6.4%  Consistent with prediabetes  >or=6.5%  Consistent with diabetes    High levels of fetal hemoglobin interfere with the HbA1C  assay. Heterozygous hemoglobin variants (HbS, HgC, etc)do  not significantly interfere with this assay.   However, presence of multiple variants may affect accuracy.     12/20/2022 7.5 (H) 4.0 - 5.6 % Final     Comment:     ADA Screening Guidelines:  5.7-6.4%  Consistent with prediabetes  >or=6.5%  Consistent with diabetes    High levels of fetal hemoglobin  "interfere with the HbA1C  assay. Heterozygous hemoglobin variants (HbS, HgC, etc)do  not significantly interfere with this assay.   However, presence of multiple variants may affect accuracy.     10/12/2022 11.9 (H) 4.0 - 5.6 % Final     Comment:     ADA Screening Guidelines:  5.7-6.4%  Consistent with prediabetes  >or=6.5%  Consistent with diabetes    High levels of fetal hemoglobin interfere with the HbA1C  assay. Heterozygous hemoglobin variants (HbS, HgC, etc)do  not significantly interfere with this assay.   However, presence of multiple variants may affect accuracy.         Statin: Taking  ACE/ARB: Taking  Screening or Prevention Patient's value Goal Complete/Controlled?   HgA1C Testing and Control   Lab Results   Component Value Date    HGBA1C 9.5 (H) 03/06/2023      Annually/Less than 8% No     Lipid profile : 10/12/2022 Annually Yes     LDL control Lab Results   Component Value Date    LDLCALC 40.4 (L) 10/12/2022    Annually/Less than 100 mg/dl  Yes     Nephropathy screening Lab Results   Component Value Date    LABMICR 26.6 08/15/2009     No results found for: PROTEINUA Annually No     Blood pressure BP Readings from Last 1 Encounters:   05/09/23 (!) 145/69    Less than 140/90 No     Dilated retinal exam : 03/28/2023 Annually Yes     Foot exam   : 10/12/2022 Annually Yes         Review of Systems  As above      Visit Vitals  BP (!) 145/69   Pulse 72   Ht 5' 9" (1.753 m)   Wt 76.2 kg (168 lb)   BMI 24.81 kg/m²       Body mass index is 24.81 kg/m².    Lab Review:   Lab Results   Component Value Date    HGBA1C 9.5 (H) 03/06/2023    HGBA1C 7.5 (H) 12/20/2022    HGBA1C 11.9 (H) 10/12/2022       Lab Results   Component Value Date    CHOL 85 (L) 10/12/2022    HDL 33 (L) 10/12/2022    LDLCALC 40.4 (L) 10/12/2022    TRIG 58 10/12/2022    CHOLHDL 38.8 10/12/2022     Lab Results   Component Value Date     (L) 04/04/2023    K 4.1 04/04/2023     04/04/2023    CO2 22 (L) 04/04/2023     (H) 04/04/2023 "    BUN 28 (H) 04/04/2023    CREATININE 3.1 (H) 04/04/2023    CALCIUM 8.2 (L) 04/04/2023    PROT 5.7 (L) 04/04/2023    ALBUMIN 2.7 (L) 04/04/2023    BILITOT 0.6 04/04/2023    ALKPHOS 166 (H) 04/04/2023    AST 37 04/04/2023    ALT 37 04/04/2023    ANIONGAP 10 04/04/2023    TSH 0.952 10/12/2022     No results found for: [id number]  Assessment and Plan     1. Type 2 diabetes mellitus with hyperglycemia, with long-term current use of insulin        2. ESRD (end stage renal disease)        3. Hyperglycemia  Ambulatory referral/consult to Endocrinology          Type 2 diabetes mellitus with hyperglycemia, with long-term current use of insulin  -- Patient is a poor historian.  -- Labs in 3 months.  -- A1c goal <7.5%.  -- Medications discussed:  Insulin   -- Reviewed logs/CGM:  Facility did not send in a glucose log and patient unable to recall SMBG checks.   Insufficient data.  Facility to send in glucose logs to make changes.   -- Medication Changes:   Insufficient data.   -- Reviewed goals of therapy are to get the best control we can without hypoglycemia.  -- Reviewed patient's current insulin regimen. Clarified proper insulin dose and timing in relation to meals, etc. Insulin injection sites and proper rotation instructed.    -- Advised frequent self blood glucose monitoring.  Patient encouraged to document glucose results and bring them to every clinic visit.  -- Hypoglycemia precautions discussed. Instructed on precautions before driving.    -- Call for Bg repeatedly < 90 or > 180.   -- Close adherence to lifestyle changes recommended.   -- Periodic follow ups for eye evaluations, foot care and dental care suggested.    ESRD (end stage renal disease)  -- Continue following with Nephrology.      Follow up in about 6 weeks (around 6/20/2023).

## 2023-05-09 PROBLEM — N25.81 SECONDARY HYPERPARATHYROIDISM OF RENAL ORIGIN: Status: ACTIVE | Noted: 2023-01-01

## 2023-05-09 NOTE — ASSESSMENT & PLAN NOTE
-- Patient is a poor historian.  -- Labs in 3 months.  -- A1c goal <7.5%.  -- Medications discussed:  Insulin   -- Reviewed logs/CGM:  Facility did not send in a glucose log and patient unable to recall SMBG checks.   Insufficient data.  Facility to send in glucose logs to make changes.   -- Medication Changes:   Insufficient data.   -- Reviewed goals of therapy are to get the best control we can without hypoglycemia.  -- Reviewed patient's current insulin regimen. Clarified proper insulin dose and timing in relation to meals, etc. Insulin injection sites and proper rotation instructed.    -- Advised frequent self blood glucose monitoring.  Patient encouraged to document glucose results and bring them to every clinic visit.  -- Hypoglycemia precautions discussed. Instructed on precautions before driving.    -- Call for Bg repeatedly < 90 or > 180.   -- Close adherence to lifestyle changes recommended.   -- Periodic follow ups for eye evaluations, foot care and dental care suggested.

## 2023-05-16 PROBLEM — E11.3313 MODERATE NONPROLIFERATIVE DIABETIC RETINOPATHY OF BOTH EYES WITH MACULAR EDEMA ASSOCIATED WITH TYPE 2 DIABETES MELLITUS: Status: ACTIVE | Noted: 2023-01-01

## 2023-05-16 PROBLEM — H33.311 RETINAL TEAR, RIGHT: Status: ACTIVE | Noted: 2023-01-01

## 2023-05-16 PROBLEM — H25.13 AGE-RELATED NUCLEAR CATARACT OF BOTH EYES: Status: ACTIVE | Noted: 2023-01-01

## 2023-05-16 NOTE — CODE/ RAPID DOCUMENTATION
RAPID RESPONSE RESPIRATORY THERAPY NOTE             Code Status: Prior   : 1965  Bed: SOL/SOL:   MRN: [id number]  Time page Received: 1135  Time Rapid Response RT at Bedside: 1137  Time Rapid Response RT left Bedside: 1145    SITUATION    Evaluated patient for: Altered/hypergylcemia     BACKGROUND    Why is the patient in the hospital?: <principal problem not specified>    Patient has a past medical history of Chronic kidney disease-mineral and bone disorder, COPD (chronic obstructive pulmonary disease), Diabetes mellitus type 1, ESRD on dialysis, Hypertension, Hypervolemia, Hyponatremia, and SOB (shortness of breath).    24 Hours Vitals Range:  Temp:  [97.8 °F (36.6 °C)]   Pulse:  [91]   Resp:  [20]   BP: (169)/(80)   SpO2:  [100 %]     Labs:    No results for input(s): NA, K, CL, CO2, CREATININE, GLU, PHOS, MG in the last 72 hours.    Invalid input(s): CMP,  BUN, TBIL     No results for input(s): PH, PCO2, PO2, HCO3, POCSATURATED, BE in the last 72 hours.    ASSESSMENT/INTERVENTIONS  Patient in and out of lucid moments. Blood sugar was in the 400's. Suggested ED evaluation.     Last Vitals: Temp: 97.8 °F (36.6 °C) (1212)  Pulse: 91 (1212)  Resp: 20 (1212)  BP: 169/80 (1212)  SpO2: 100 % (1212)  Level of Consciousness: Level of Consciousness (AVPU): alert  Respiratory Effort:    Expansion/Accessory Muscle Usage:    All Lung Field Breath Sounds:    O2 Device/Concentration: Nasal Cannula 2L  NIPPV: No Surgical airway: No Vent: No  ETCO2 monitored:    Ambu at bedside:      Active Orders   Respiratory Care    Pulse Oximetry Continuous     Frequency: Continuous     Number of Occurrences: Until Specified       RECOMMENDATIONS  ?  We recommend: RRT Recs: Patient be further evaluated in the ED    ESCALATION    ED evaluation    FOLLOW-UP    Disposition: Tx to ER bed unkown- patient left at triage/waiting room.    Please call back the Rapid Response RT, Alesia Simpson, RRT at x [phone] for  any questions or concerns.

## 2023-05-16 NOTE — PROVIDER PROGRESS NOTES - EMERGENCY DEPT.
Encounter Date: 5/16/2023    ED Physician Progress Notes        Physician Note:   Patient placed in a room on the board, however, left before physically being placed in the room.    KYLE Quintana MD  Staff ED Physician  05/16/2023 2:10 PM

## 2023-05-16 NOTE — CARE UPDATE
"RAPID RESPONSE NURSE NOTE        Admit Date: 2023  LOS: 0  Code Status: Prior   Date of Consult: 2023  : 1965  Age: 57 y.o.  Weight:   Wt Readings from Last 1 Encounters:   23 76.2 kg (168 lb)     Sex: male  Race: Black or    Bed: SOL/SOL:   MRN: [id number]  Time Rapid Response Team page Received: 1135  Time Rapid Response Team at Bedside: 1137  Time Rapid Response Team left Bedside: 1145  Was the patient discharged from an ICU this admission? No  Was the patient discharged from a PACU within last 24 hours? No   Did the patient receive conscious sedation/general anesthesia in last 24 hours? No  Was the patient in the ED within the past 24 hours? No  Was the patient on NIPPV within the past 24 hours? No   Did this progress into an ARC or CPA: No  Attending Physician: CORDELIA  Primary Service: NA    SITUATION    Notified by overhead page.  Reason for alert: AMS,   Called to evaluate the patient for Neuro    BACKGROUND     Why is the patient in the hospital?: Patient was in the hospital for optho appointment.  reported patient with intermittent episodes of confusion. BS taken by clinic staff and reported to be 442. Patient transported to ED for evaluation.     Patient has a past medical history of Chronic kidney disease-mineral and bone disorder, COPD (chronic obstructive pulmonary disease), Diabetes mellitus type 1, ESRD on dialysis, Hypertension, Hypervolemia, Hyponatremia, and SOB (shortness of breath).    ASSESSMENT/INTERVENTIONS    What did you find: Patient awake sitting up in wheelchair. Patient reports "I am fine", but agreeable to transport to ED for evaluation. /75, HR 62    RECOMMENDATIONS    We recommend: ED transfer and evaluation.    PROVIDER ESCALATION    Orders received and case discussed with NA.    FOLLOW UP    Call the Rapid Response Nurse, Tammie Up RN at [phone] for additional questions or concerns.        "

## 2023-05-16 NOTE — PROGRESS NOTES
HPI    Referral by Dr. Jona Castillo MD    Retinal Eval for the treatment of : Early ERM OU     -eye pain   -blurred [facility]  -flashes/floaters  -headaches  -curtain/shadows/veils    Eye Meds AT's OU prn     POHx: NONE     Last edited by Ninfa Andrew MA on 5/16/2023 10:17 AM.         A/P    ICD-10-CM ICD-9-CM   1. Moderate nonproliferative diabetic retinopathy of both eyes with macular edema associated with type 2 diabetes mellitus  E11.3313 250.50     362.07     362.05   2. Retinal tear, right  H33.311 361.00   3. Age-related nuclear cataract of both eyes  H25.13 366.16       1. Moderate nonproliferative diabetic retinopathy of both eyes with macular edema associated with type 2 diabetes mellitus  Inpatient f/u for DR/DME check  PCP  Primary Doctor No  04/14/2023  9.0  A1C    No injections or laser OU  Exam notable for mod DR with tr EF IRF, no NV noted  Plan: Observation  Recommend good blood pressure control, tight blood glucose control, and good cholesterol control     2. Retinal tear, right  Pt with floaters, occ flash, exam notable for 7oclock tear with tr SRF  Plan: given symptomatic, recommend urgent laser retinopexy for retinal tear   Based on todays exam, diagnostic studies, and review of records, the determination was made for treatment today.  Schedule Laser Retinopexy today Right Eye Patient chooses to proceed with laser R/B/A discussed patient elects to proceed    Patient identified.  Timeout performed.    Laser Procedure Note  Laser Retinopexy Right Eye  Anesthesia: topical Proparacaine  Complications: none  Size: 200 microns  Duration: 80 ms  Power: 300  Number: 221  Good laser uptake, no complications  F/u as above, RTC sooner PRN     Pathology of PVD, Retinal Tear, Retinal Detachment reviewed in great detail  RD precautions discussed in detail, patient expressed understanding  RTC immediately PRN (especially ANY change flashes, floaters, vision, visual field)       3. Age-related nuclear cataract  of both eyes  Mild NS, NVS  Plan: Observation Update Mrx prn    RTC 2-3 weeks DFE/OCTm OU         I saw and examined the patient and reviewed in detail the findings documented. The final examination findings, image interpretations, and plan as documented in the record represent my personal judgment and conclusions.    Micah Head MD  Vitreoretinal Surgery   [facility]

## 2023-05-17 NOTE — ED PROVIDER NOTES
"Encounter Date: 5/16/2023       History     Chief Complaint   Patient presents with     Referral      Sent down from Ophthalmology clinic for hyperglycemia and swelling to BLE. Pt w/ hx of DM, ESRD on HD (MWF) last dialyzed yesterday - on 2L of O2 via NC. Pt states " I feel perfectly fine. Khris why they done brought me down here."     Pt left after triage before being placed in exam room    Review of patient's allergies indicates:  No Known Allergies  Past Medical History:   Diagnosis Date    Chronic kidney disease-mineral and bone disorder 10/12/2022    COPD (chronic obstructive pulmonary disease)     Diabetes mellitus type 1     ESRD on dialysis     Hypertension     Hypervolemia 2/22/2023    Hyponatremia 3/4/2023    SOB (shortness of breath) 10/12/2022    Patient with history COPD treated with Ellipta the albuterol, fluticasone-salmeterol tachypneic in the ED saturating 94% and 6 L on nasal cannula, patient does not know how many  he uses it is in nursing home.    -duo nebs Q 4  Given that patient is a poor historian, and in the setting of left lower extremity edema and history of coagulopathy PE cannot be ruled out.  Will workup for possible infec     Past Surgical History:   Procedure Laterality Date    AV FISTULA PLACEMENT       Family History   Problem Relation Age of Onset    Diabetes Mother      Social History     Tobacco Use    Smoking status: Never    Smokeless tobacco: Never   Substance Use Topics    Alcohol use: Not Currently     Review of Systems    Physical Exam     Initial Vitals [05/16/23 1212]   BP Pulse Resp Temp SpO2   (!) 169/80 91 20 97.8 °F (36.6 °C) 100 %      MAP       --         Physical Exam    ED Course   Procedures  Labs Reviewed   POCT GLUCOSE - Abnormal; Notable for the following components:       Result Value    POCT Glucose >500 (*)     All other components within normal limits          Imaging Results    None          Medications - No data to display                       "     Clinical Impression:   Final diagnoses:  [R73.9] Hyperglycemia        ED Disposition Condition    LWBS after Quick Look                 Jacinda Quintana MD  05/17/23 0837

## 2023-06-01 NOTE — PROGRESS NOTES
HPI    [facility] OU no changes since last visit.  Gtts: A-T PRN OU  Last edited by Aisha Sierra on 6/1/2023 10:53 AM.         A/P    ICD-10-CM ICD-9-CM   1. Moderate nonproliferative diabetic retinopathy of both eyes with macular edema associated with type 2 diabetes mellitus  E11.3313 250.50     362.07     362.05   2. Retinal tear, right  H33.311 361.00   3. Age-related nuclear cataract of both eyes  H25.13 366.16         1. Moderate nonproliferative diabetic retinopathy of both eyes with macular edema associated with type 2 diabetes mellitus  Inpatient f/u for DR/DME check  PCP  Primary Doctor No  04/14/2023  9.0  A1C    No injections or laser OU  Exam today with mod DR with EF IRF, no NV   Plan: Observation, consider avastin if worsening   Recommend good blood pressure control, tight blood glucose control, and good cholesterol control     2. Retinal tear, right  Initial visit- Pt with floaters, occ flash, exam notable for 7oclock tear with tr SRF    S/p LRX 5/16/23    Today good laser, resolved SRF with   Plan: Observation  Pathology of PVD, Retinal Tear, Retinal Detachment reviewed in great detail  RD precautions discussed in detail, patient expressed understanding  RTC immediately PRN (especially ANY change flashes, floaters, vision, visual field)     3. Age-related nuclear cataract of both eyes  Mild NS, NVS  Plan: Observation Update Mrx prn      RTC 3-4 months DFE/OCTm OU   RTC [employer] updated Mrx      I saw and examined the patient and reviewed in detail the findings documented. The final examination findings, image interpretations, and plan as documented in the record represent my personal judgment and conclusions.    Micah Head MD  Vitreoretinal Surgery   [facility]

## 2023-06-14 NOTE — DISCHARGE INSTRUCTIONS
Thank you for allowing us to participate in your healthcare needs. We really appreciate it and hope that the care we provided was satisfactory.    - Dr. Ward
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
## 2023-06-14 NOTE — ED PROVIDER NOTES
Encounter Date: 6/14/2023       History     Chief Complaint   Patient presents with    Head Injury     Pt states he fell.  Possibly a day or two, no accomodation of wound.    Aggressive Behavior     Got agressive with dialysis staff and EMS, still has dialysis needles in shunt, would not allow staff to remove them.     History obtained from the patient and EMS without limitations.  57-year-old with a blow past medical history pre presents by from his hemodialysis center after fainting while receiving dialysis. He was confused and combative when he regained consciousness which resulted in the access needles not being removed from his LUE fistula before he was transported to the ED. He complains of hunger. He has no other acute complaints.      Review of patient's allergies indicates:  No Known Allergies  Past Medical History:   Diagnosis Date    Chronic kidney disease-mineral and bone disorder 10/12/2022    COPD (chronic obstructive pulmonary disease)     Diabetes mellitus type 1     ESRD on dialysis     Hypertension     Hypervolemia 2/22/2023    Hyponatremia 3/4/2023    SOB (shortness of breath) 10/12/2022    Patient with history COPD treated with Ellipta the albuterol, fluticasone-salmeterol tachypneic in the ED saturating 94% and 6 L on nasal cannula, patient does not know how many  he uses it is in nursing home.    -duo nebs Q 4  Given that patient is a poor historian, and in the setting of left lower extremity edema and history of coagulopathy PE cannot be ruled out.  Will workup for possible infec     Past Surgical History:   Procedure Laterality Date    AV FISTULA PLACEMENT       Family History   Problem Relation Age of Onset    Diabetes Mother      Social History     Tobacco Use    Smoking status: Never    Smokeless tobacco: Never   Substance Use Topics    Alcohol use: Not Currently     Review of Systems  See HPI.  Remainder of 10 point systems review negative.    Physical Exam     Initial Vitals [06/14/23  1413]   BP Pulse Resp Temp SpO2   (!) 156/82 74 14 98.1 °F (36.7 °C) 99 %      MAP       --         Physical Exam    Nursing note and vitals reviewed.  Constitutional: He is not diaphoretic. No distress.   HENT:   Head: Normocephalic and atraumatic.   Mouth/Throat: Oropharynx is clear and moist.   Eyes: Pupils are equal, round, and reactive to light.   Cardiovascular:  Normal rate, regular rhythm and normal heart sounds.     Exam reveals no gallop and no friction rub.       No murmur heard.  Pulmonary/Chest: No respiratory distress. He has no wheezes. He has no rhonchi.   Abdominal: Abdomen is soft. There is no abdominal tenderness.     Neurological: He is alert and oriented to person, place, and time. He has normal strength. Coordination normal. GCS score is 15. GCS eye subscore is 4. GCS verbal subscore is 5. GCS motor subscore is 6.   Skin: Skin is warm and dry. No pallor.   Psychiatric: He has a normal mood and affect. His speech is normal and behavior is normal. He is not actively hallucinating. He is attentive.       ED Course   Procedures  Labs Reviewed   CBC W/ AUTO DIFFERENTIAL - Abnormal; Notable for the following components:       Result Value    WBC 2.84 (*)     RBC 4.18 (*)     Hemoglobin 12.4 (*)     Hematocrit 38.5 (*)     Lymph # 0.5 (*)     Mono % 16.5 (*)     All other components within normal limits   COMPREHENSIVE METABOLIC PANEL - Abnormal; Notable for the following components:    Sodium 134 (*)     Potassium 3.4 (*)     Chloride 88 (*)     CO2 30 (*)     Glucose 165 (*)     BUN 23 (*)     Creatinine 2.7 (*)     Albumin 3.3 (*)     Alkaline Phosphatase 230 (*)     AST 92 (*)     ALT 60 (*)     eGFR 26.3 (*)     All other components within normal limits   TROPONIN I - Abnormal; Notable for the following components:    Troponin I 0.038 (*)     All other components within normal limits   B-TYPE NATRIURETIC PEPTIDE - Abnormal; Notable for the following components:    BNP 2,762 (*)     All other  components within normal limits   POCT GLUCOSE - Abnormal; Notable for the following components:    POCT Glucose 171 (*)     All other components within normal limits   TSH            Imaging Results    None          Medications - No data to display  Medical Decision Making:   Independently Interpreted Test(s):   I have ordered and independently interpreted EKG Reading(s) - see prior notes  Clinical Tests:   Lab Tests: Reviewed  Medical Tests: Reviewed    MDM:  The patient was evaluated following a syncopal episode that occurred while he was receiving hemodialysis.  He was combative when he regained consciousness but was calm and cooperative when the medics arrived to bring him to the ED and upon arrival.  His blood pressure was elevated.  Other vital signs were normal.  He had no focal neurological deficits.  He had normal heart lung sounds.  EKG revealed sinus rhythm with first-degree AV block which was stable from prior EKG. Lab review revealed mild hyponatremia, hypokalemia, and hypochloremia as well as metabolic alkalosis and mildly elevated transaminases.  Troponin and BNP were also elevated.  Troponin is chronically elevated the patient had no chest pain so there was little suspicion for acute coronary syndromes.  The patient's BNP is chronically elevated.  The patient had mild leukopenia which is chronic.  He has chronic anemia.  His hemoglobin hematocrit were improved from prior visits.  The patient was monitored for several hours.  He developed no complaints there was no deterioration of condition so he was discharged.  Fainting likely due to vasovagal reaction.  Patient was instructed to arrange close follow-up with his primary care provider and nephrologist.  Strict ED return precautions were given.           ED Course as of 06/16/23 0754   Wed Jun 14, 2023   1512 EKG 12-lead  Independent interpretation.    Sinus rhythm with first-degree AV block (OR interval 218 milliseconds).  Ventricular rate 71 beats  per minute rightward axis intermediate QRS.  Prolonged QTc (536 milliseconds).  No ST segment elevation or depression. [LP]      ED Course User Index  [LP] Jairon Ward III, MD                 Clinical Impression:   Final diagnoses:  [R55] Syncope        ED Disposition Condition    Discharge Stable          ED Prescriptions    None       Follow-up Information       Follow up With Specialties Details Why Contact Info    Your primary care provider   Schedule a close ER follow-up visit.     Your nephrologist   Schedule a close ER follow-up visit.     ER   Return to the ER for repeated episodes of fainting or if you develop chest pain, breathing difficulty, persistent vomiting, or if you have any other immediate concerns.              Jairon Ward III, MD  06/16/23 0870

## 2023-06-19 PROBLEM — J96.21 ACUTE ON CHRONIC RESPIRATORY FAILURE WITH HYPOXIA: Status: RESOLVED | Noted: 2022-10-12 | Resolved: 2023-01-01

## 2023-06-20 NOTE — ED PROVIDER NOTES
Encounter Date: 6/20/2023       History     Chief Complaint   Patient presents with    Shortness of Breath     Pt to ED via EMS from [facility] with reports of AMS. Pt AxOx4 upon EMS arrival but lethargic. Found to be 88% RA, placed on 10 L NRB and improved to 100%. Wheelchair bound.      HPI  This is a 57 year old male with past medical history of COPD, diabetes, ESRD on dialysis, hypertension, diabetes who presents from [place] with altered mental status and hypoxia.  He was reported to have swelling of his face as well.  Patient denies any pain.  He denies difficulty seeing though his eye swelling is new.  He is oriented to self.  No focal weakness noted on exam.  Review of patient's allergies indicates:  No Known Allergies  Past Medical History:   Diagnosis Date    Chronic kidney disease-mineral and bone disorder 10/12/2022    COPD (chronic obstructive pulmonary disease)     Diabetes mellitus type 1     ESRD on dialysis     Hypertension     Hypervolemia 2/22/2023    Hyponatremia 3/4/2023    SOB (shortness of breath) 10/12/2022    Patient with history COPD treated with Ellipta the albuterol, fluticasone-salmeterol tachypneic in the ED saturating 94% and 6 L on nasal cannula, patient does not know how many  he uses it is in nursing home.    -duo nebs Q 4  Given that patient is a poor historian, and in the setting of left lower extremity edema and history of coagulopathy PE cannot be ruled out.  Will workup for possible infec     Past Surgical History:   Procedure Laterality Date    AV FISTULA PLACEMENT       Family History   Problem Relation Age of Onset    Diabetes Mother      Social History     Tobacco Use    Smoking status: Never    Smokeless tobacco: Never   Substance Use Topics    Alcohol use: Not Currently     Review of Systems    Physical Exam     Initial Vitals   BP Pulse Resp Temp SpO2   06/20/23 1737 06/20/23 1737 06/20/23 1737 06/20/23 1739 06/20/23 1735   (!) 172/93 (!) 59 (!) 24 96.6 °F (35.9 °C)  100 %      MAP       --                Physical Exam  General: Awake and alert, well-nourished  HENT: moist mucous membranes, facial swelling more on L than R without erythema or tenderness or warmth  Eyes: No conjunctival injection, eyelid edema bilaterally, difficult to tell if there is proptosis bilaterally as well.  Pulm: Mild crackles bilaterally mild tachypnea, no increased work of breathing  CV: Regular rate and rhythm, no murmur noted  Abdomen: distended, non-tender to palpation  Skin: No rash noted  Neuro: No facial asymmetry, grossly normal movements of arms and legs, answers questions but a bit somnolent.  Psychiatric: Cooperative    ED Course   Procedures  Labs Reviewed   CBC W/ AUTO DIFFERENTIAL - Abnormal; Notable for the following components:       Result Value    WBC 3.26 (*)     RBC 4.07 (*)     Hemoglobin 12.3 (*)     Hematocrit 38.8 (*)     MCHC 31.7 (*)     Gran # (ANC) 1.6 (*)     Lymph # 0.9 (*)     Mono % 19.3 (*)     All other components within normal limits   COMPREHENSIVE METABOLIC PANEL - Abnormal; Notable for the following components:    Sodium 134 (*)     Chloride 92 (*)     Glucose 238 (*)     BUN 25 (*)     Creatinine 3.2 (*)     Calcium 8.1 (*)     Albumin 2.8 (*)     Alkaline Phosphatase 184 (*)     eGFR 21.5 (*)     Anion Gap 17 (*)     All other components within normal limits   CBC W/ AUTO DIFFERENTIAL - Abnormal; Notable for the following components:    RBC 4.51 (*)     Hemoglobin 13.4 (*)     MCHC 31.6 (*)     Mono % 17.4 (*)     All other components within normal limits   COMPREHENSIVE METABOLIC PANEL - Abnormal; Notable for the following components:    Sodium 133 (*)     CO2 21 (*)     Glucose 243 (*)     BUN 25 (*)     Creatinine 3.5 (*)     Calcium 8.1 (*)     Albumin 2.8 (*)     Alkaline Phosphatase 185 (*)     eGFR 19.3 (*)     Anion Gap 17 (*)     All other components within normal limits   LACTIC ACID, PLASMA - Abnormal; Notable for the following components:    Lactate  (Lactic Acid) 3.0 (*)     All other components within normal limits   TROPONIN I - Abnormal; Notable for the following components:    Troponin I 0.028 (*)     All other components within normal limits   B-TYPE NATRIURETIC PEPTIDE - Abnormal; Notable for the following components:    BNP 3,281 (*)     All other components within normal limits   C-REACTIVE PROTEIN - Abnormal; Notable for the following components:    CRP 13.3 (*)     All other components within normal limits   PROCALCITONIN - Abnormal; Notable for the following components:    Procalcitonin 0.54 (*)     All other components within normal limits   ISTAT PROCEDURE - Abnormal; Notable for the following components:    POC PH 7.307 (*)     POC PCO2 68.1 (*)     POC PO2 31 (*)     POC HCO3 34.0 (*)     POC SATURATED O2 52 (*)     POC Sodium 135 (*)     POC Potassium 3.3 (*)     POC TCO2 36 (*)     All other components within normal limits   ISTAT PROCEDURE - Abnormal; Notable for the following components:    POC PH 7.270 (*)     POC PCO2 76.3 (*)     POC PO2 18 (*)     POC HCO3 35.1 (*)     POC SATURATED O2 19 (*)     POC TCO2 37 (*)     All other components within normal limits   ISTAT PROCEDURE - Abnormal; Notable for the following components:    POC PH 7.275 (*)     POC PCO2 69.0 (*)     POC PO2 13 (*)     POC HCO3 32.1 (*)     POC SATURATED O2 12 (*)     POC TCO2 34 (*)     All other components within normal limits   LIPASE   MAGNESIUM   TROPONIN I   TSH   B-TYPE NATRIURETIC PEPTIDE   LIPASE   MAGNESIUM   AMMONIA   ALCOHOL,MEDICAL (ETHANOL)   URINALYSIS, REFLEX TO URINE CULTURE   HIV 1 / 2 ANTIBODY   HEPATITIS C ANTIBODY   AMMONIA   DRUG SCREEN PANEL, URINE EMERGENCY     EKG Readings: (Independently Interpreted)   NSR, rightward axis, prolonged QTC, nonspecific T wave changes, no STEMI   ECG Results              EKG 12-lead (Final result)  Result time 06/21/23 08:42:19      Final result by Interface, Lab In [facility] (06/21/23 08:42:19)                    Narrative:    Test Reason : R41.82,    Vent. Rate : 057 BPM     Atrial Rate : 057 BPM     P-R Int : 266 ms          QRS Dur : 110 ms      QT Int : 534 ms       P-R-T Axes : 041 106 069 degrees     QTc Int : 519 ms    Sinus bradycardia with 1st degree A-V block  Rightward axis  Prolonged QT  Nonspecific T wave abnormality  Abnormal ECG  When compared with ECG of 14-JUN-2023 14:57,  Nonspecific T wave abnormality, worse in Anterior leads  Confirmed by JAZMINE SEWELL MD ([id number]) on 6/21/2023 8:42:12 AM    Referred By: MIKAYLA   SELF           Confirmed By:JAZMINE SEWELL MD                                  Imaging Results              X-Ray Abdomen AP 1 View (KUB) (Final result)  Result time 06/20/23 23:55:39      Final result by Aguilar Garcia MD (06/20/23 23:55:39)                   Impression:      Cardiomegaly with probable bibasilar pulmonary edema and small bilateral pleural effusions, though with mildly improved bibasilar aeration when compared with 04/02/2023.    Nonobstructive bowel gas pattern with findings which may suggest underlying ascites.      Electronically signed by: Aguilar Garcia MD  Date:    06/20/2023  Time:    23:55               Narrative:    EXAMINATION:  XR ABDOMEN AP 1 VIEW    CLINICAL HISTORY:  Altered mental status, unspecified.    TECHNIQUE:  Single AP View of the abdomen was performed.    COMPARISON:  04/02/2023.    FINDINGS:  Partially visualized cardiac silhouette is enlarged similar to the prior exam.  Patchy bibasilar interstitial and airspace opacities with probable mild improvement in bibasilar aeration when compared with 04/02/2023.  Small bilateral pleural effusions suspected.  Bowel gas pattern is nonobstructive.  Relative central positioning of small-bowel gas pattern which may be related to underlying ascites.  No pathologic calcifications identified.  Bones demonstrate mild degenerative changes and relatively sclerotic appearance which may suggest underlying renal  osteodystrophy.  No unexpected metallic foreign body identified in the field of view.                                       CT Head Without Contrast (Final result)  Result time 06/20/23 21:26:31      Final result by Rhina Duenas MD (06/20/23 21:26:31)                   Impression:      No convincing acute intracranial abnormalities as imaged allowing for significant artifact.    Left mastoiditis.    Patchy deep white matter low density as seen with microvascular chronic ischemic changes.    Remote lacunar infarct in the right basal ganglia.      Electronically signed by: Rhina Duenas  Date:    06/20/2023  Time:    21:26               Narrative:    EXAMINATION:  CT HEAD WITHOUT CONTRAST    CLINICAL HISTORY:  Mental status change, unknown cause;    TECHNIQUE:  Low dose axial images were obtained through the head.  Coronal and sagittal reformations were also performed. Contrast was not administered.    COMPARISON:  CT orbits 03/10/2023.    FINDINGS:  Severe motion artifact is present throughout the exam limiting assessment.  The no convincing acute intracranial hemorrhage or hematoma.  The allowing for motion there is no loss of the gray-white matter junction differentiation to suggest acute major arterial infarct.  Low density in the periventricular white matter the is noted.  There is stable low density in the right basal ganglia compatible with remote lacunar infarct.    There is soft tissue prominence over the left zygoma and lateral orbit.  Scattered opacified left mastoid air cells compatible with mastoiditis.  Middle ears appear clear.  Bony calvarium is intact.                                       X-Ray Chest AP Portable (Final result)  Result time 06/20/23 20:33:32      Final result by Juventino Castro MD (06/20/23 20:33:32)                   Impression:      1. Pulmonary findings suggest edema noting left pleural effusion.  Developing consolidation not excluded.  Correlation and follow-up is  advised.      Electronically signed by: Juventino Castro MD  Date:    06/20/2023  Time:    20:33               Narrative:    EXAMINATION:  XR CHEST AP PORTABLE    CLINICAL HISTORY:  Shortness of breath    TECHNIQUE:  Single frontal view of the chest was performed.    COMPARISON:  03/31/2023    FINDINGS:  The cardiomediastinal silhouette is prominent, similar to the previous exam.  There is obscuration of the left costophrenic angle, possibly reflecting small effusion..  The trachea is midline.  The lungs are symmetrically expanded bilaterally with prominent interstitial attenuation in a perihilar distribution.  There is bandlike atelectasis projected over the right midlung zone..  There is no pneumothorax.  The osseous structures are remarkable for degenerative changes.  Surgical change noted of the left chest wall..                                    X-Rays:   Independently Interpreted Readings:   Other Readings:  CXR: bilateral pulmonary edema and mild pleural effusion  Medications   furosemide injection 80 mg (has no administration in time range)   sodium chloride 0.9% flush 10 mL (has no administration in time range)   apixaban tablet 5 mg (5 mg Oral Given 6/22/23 0904)   artificial tears 0.5 % ophthalmic solution 1 drop (1 drop Both Eyes Given 6/22/23 1306)   aspirin EC tablet 81 mg (81 mg Oral Given 6/22/23 0904)   atorvastatin tablet 40 mg (40 mg Oral Given 6/22/23 0904)   carvediloL tablet 6.25 mg (6.25 mg Oral Given 6/22/23 0904)   cetirizine tablet 10 mg (has no administration in time range)   erythromycin 5 mg/gram (0.5 %) ophthalmic ointment ( Both Eyes Given 6/22/23 1307)   FLUoxetine capsule 40 mg (40 mg Oral Given 6/22/23 0904)   sevelamer carbonate tablet 800 mg (800 mg Oral Given 6/22/23 1407)   sodium chloride 0.9% flush 10 mL (has no administration in time range)   isosorbide mononitrate 24 hr tablet 30 mg (30 mg Oral Given 6/22/23 0904)   lisinopriL tablet 40 mg (40 mg Oral Given 6/21/23 2118)    NIFEdipine 24 hr tablet 60 mg (60 mg Oral Given 6/22/23 0904)   melatonin tablet 3 mg (has no administration in time range)   albuterol-ipratropium 2.5 mg-0.5 mg/3 mL nebulizer solution 3 mL (3 mLs Nebulization Given by Other 6/22/23 1525)   sodium chloride 0.9% bolus 250 mL 250 mL (has no administration in time range)   heparin (porcine) injection 1,000 Units (has no administration in time range)   mupirocin 2 % ointment ( Nasal Given 6/22/23 0906)   tiotropium bromide 2.5 mcg/actuation inhaler 2 puff (2 puffs Inhalation Given 6/22/23 1200)   amoxicillin-clavulanate 500-125mg per tablet 500 mg (has no administration in time range)   dextrose 10% bolus 125 mL 125 mL (has no administration in time range)   dextrose 10% bolus 250 mL 250 mL (has no administration in time range)   insulin regular in 0.9 % NaCl 100 unit/100 mL (1 unit/mL) infusion (7 Units/hr Intravenous Rate/Dose Change 6/22/23 1610)   furosemide injection 80 mg (80 mg Intravenous Given 6/21/23 0050)   methylPREDNISolone sodium succinate injection 125 mg (125 mg Intravenous Given 6/21/23 0051)   albuterol-ipratropium 2.5 mg-0.5 mg/3 mL nebulizer solution 3 mL (3 mLs Nebulization Given 6/21/23 0052)   cefTRIAXone (ROCEPHIN) 1 g in dextrose 5 % in water (D5W) 5 % 100 mL IVPB (MB+) (0 g Intravenous Stopped 6/21/23 0506)   LIDOcaine HCL 10 mg/ml (1%) injection 5 mL (5 mLs Other Given 6/21/23 0615)   0.9%  NaCl infusion (0 mL/hr Intravenous Stopped 6/21/23 1034)   cefTRIAXone (ROCEPHIN) 1 g in dextrose 5 % in water (D5W) 5 % 100 mL IVPB (MB+) (0 g Intravenous Stopped 6/21/23 1220)   insulin detemir U-100 (Levemir) pen 3 Units (3 Units Subcutaneous Given 6/22/23 0912)   hydrALAZINE injection 5 mg (5 mg Intravenous Given 6/22/23 1058)     Medical Decision Making:   Differential Diagnosis:   Metabolic abnormality, hypercapnia, meningitis, COPD exacerbation, CHF, volume overload from ESRD, intracranial mass or bleed, sepsis,  stroke, hypothyroid  ED  Management:  Pt somewhat somnolent, no respiratory distress, hemodynamically stable.  Some hypercapnia on VBG though not severe and no increased WOB.  I titrated down O2 on NC to 2L to target O2 >90 and not cause hypopnea given his underlying COPD.  Does not seem to be in COPD exacerbation on exam.  Volume overloaded, likely CHF exacerbation.  80 mg lasix given, pt states that he makes urine.  Only mild troponin elevation, EKG without acute ischemic changes.  No leukocytosis, there is slight increase in CRP and procalcitonin though vitals not suggestive of sepsis, will watch closely and cover with ceftiraxone for now.  No headache or meningismus to suggest meningitis as cause of AMS.    CT head without evidence of bleed or mass, possible mastoiditis though he has no ear pain or mastoid tenderness on exam making this less clinically likely.  On repeat VBG he has some worsening of pCO2 and pH, still wakes up and talks but a bit somnolent.  No focal deficits and AMS seems likely metabolic.  This was at time of signout to Dr. Banks who is placing on BIPAP.  Some labs including TSH still pending due to initial difficulty with lab receiving the blood.  Plan for admit likely to ICU, will need dialysis.                          Clinical Impression:   Final diagnoses:  [R41.82] Altered mental status  [R06.02] Shortness of breath  [J81.0] Acute pulmonary edema  [N18.6, Z99.2] End-stage renal disease on hemodialysis  [R06.89] CO2 narcosis (Primary)  [J96.21, J96.22] Acute on chronic respiratory failure with hypoxia and hypercapnia        ED Disposition Condition    Admit Stable                Damion Foster MD  06/22/23 3246

## 2023-06-21 PROBLEM — H70.91 MASTOIDITIS OF RIGHT SIDE: Status: RESOLVED | Noted: 2023-01-01 | Resolved: 2023-01-01

## 2023-06-21 PROBLEM — J96.22 ACUTE ON CHRONIC RESPIRATORY FAILURE WITH HYPOXIA AND HYPERCAPNIA: Status: ACTIVE | Noted: 2023-01-01

## 2023-06-21 PROBLEM — J96.21 ACUTE ON CHRONIC RESPIRATORY FAILURE WITH HYPOXIA AND HYPERCAPNIA: Status: ACTIVE | Noted: 2023-01-01

## 2023-06-21 PROBLEM — I50.9 ACUTE DECOMPENSATED HEART FAILURE: Status: ACTIVE | Noted: 2023-01-01

## 2023-06-21 PROBLEM — I50.33 ACUTE ON CHRONIC DIASTOLIC HEART FAILURE: Status: ACTIVE | Noted: 2023-01-01

## 2023-06-21 PROBLEM — H70.91 MASTOIDITIS OF RIGHT SIDE: Status: ACTIVE | Noted: 2023-01-01

## 2023-06-21 PROBLEM — H70.92 MASTOIDITIS OF LEFT SIDE: Status: ACTIVE | Noted: 2023-01-01

## 2023-06-21 PROBLEM — E44.0 MODERATE MALNUTRITION: Status: ACTIVE | Noted: 2023-01-01

## 2023-06-21 NOTE — HPI
Mr. Cosme Lewis is a 56 y/o M with hx of HFpEF (LVEF 55%, Grade II DD on echo 3/20/23), chronic RF on home O2, IDDM2, ESRD on HD, COPD, HTN, muliple PEs on eliquis who presented to the ED from [facility] for evaluation of AMS and hypoxia. He was in his usual state of health until yesterday afternoon when staff noticed he was more lethargic than normal, and was satting in the 80s.     In the ED, patient afebrile, hypertensive to 170s systolic, HR 50s, hypoxic requiring 10L NRB. CBC grossly unchanged from BL with chronic leukopenia and anemia. CMP also grossly unchanged from BL, with known mild hyponatremia. Lactic elevated to 3.0. BNP 3200. Trops wnl. Procal and CRP elevated. CXR demonstrating bilateral edema as well as L pleural effusion. CTH showing L mastoiditis, otherwise no acute intracranial abnormality. Initial VBG demonstrating pH 7.30/PCO2 68. Patient given 80 lasix IV, duo-nebs, ceftriaxone, and solumedrol. repeat VBG with pH down to 7.27, CO2 76. Patient placed on bipap and MICU consulted for management of severe respiratory acidosis s/o HFE and COPD.

## 2023-06-21 NOTE — SUBJECTIVE & OBJECTIVE
Past Medical History:   Diagnosis Date    Chronic kidney disease-mineral and bone disorder 10/12/2022    COPD (chronic obstructive pulmonary disease)     Diabetes mellitus type 1     ESRD on dialysis     Hypertension     Hypervolemia 2/22/2023    Hyponatremia 3/4/2023    SOB (shortness of breath) 10/12/2022    Patient with history COPD treated with Ellipta the albuterol, fluticasone-salmeterol tachypneic in the ED saturating 94% and 6 L on nasal cannula, patient does not know how many  he uses it is in nursing home.    -duo nebs Q 4  Given that patient is a poor historian, and in the setting of left lower extremity edema and history of coagulopathy PE cannot be ruled out.  Will workup for possible infec       Past Surgical History:   Procedure Laterality Date    AV FISTULA PLACEMENT         Review of patient's allergies indicates:  No Known Allergies    Family History       Problem Relation (Age of Onset)    Diabetes Mother          Tobacco Use    Smoking status: Never    Smokeless tobacco: Never   Substance and Sexual Activity    Alcohol use: Not Currently    Drug use: Not on file    Sexual activity: Not Currently      Review of Systems   Constitutional:  Positive for fatigue. Negative for chills and fever.   HENT:  Positive for facial swelling. Negative for congestion and sore throat.    Respiratory:  Positive for shortness of breath. Negative for cough.    Cardiovascular:  Positive for leg swelling. Negative for chest pain and palpitations.   Gastrointestinal:  Negative for abdominal pain, nausea and vomiting.   Genitourinary:  Negative for dysuria and frequency.   Musculoskeletal:  Negative for arthralgias and back pain.   Skin:  Negative for rash and wound.   Neurological:  Negative for dizziness and headaches.   Psychiatric/Behavioral:  Negative for agitation and confusion.    Objective:     Vital Signs (Most Recent):  Temp: 97.9 °F (36.6 °C) (06/21/23 0309)  Pulse: (!) 58 (06/21/23 0431)  Resp: 16 (06/21/23  0431)  BP: (!) 159/89 (06/21/23 0400)  SpO2: (!) 90 % (06/21/23 0431) Vital Signs (24h Range):  Temp:  [96.6 °F (35.9 °C)-98.7 °F (37.1 °C)] 97.9 °F (36.6 °C)  Pulse:  [55-59] 58  Resp:  [14-25] 16  SpO2:  [90 %-100 %] 90 %  BP: (137-181)/() 159/89   Weight: 65.3 kg (143 lb 15.4 oz)  Body mass index is 21.26 kg/m².    No intake or output data in the 24 hours ending 06/21/23 0444       Physical Exam  Vitals and nursing note reviewed.   Constitutional:       General: He is not in acute distress.     Appearance: He is ill-appearing. He is not toxic-appearing or diaphoretic.   HENT:      Head: Atraumatic.      Comments: Notable swelling to L side of face     Nose: Nose normal. No congestion or rhinorrhea.      Mouth/Throat:      Mouth: Mucous membranes are dry.      Pharynx: Oropharynx is clear. No oropharyngeal exudate or posterior oropharyngeal erythema.   Eyes:      General: No scleral icterus.     Extraocular Movements: Extraocular movements intact.      Conjunctiva/sclera: Conjunctivae normal.      Pupils: Pupils are equal, round, and reactive to light.   Cardiovascular:      Rate and Rhythm: Normal rate and regular rhythm.      Pulses: Normal pulses.      Heart sounds: Normal heart sounds. No murmur heard.    No friction rub. No gallop.   Pulmonary:      Effort: No respiratory distress.      Breath sounds: Rales present. No wheezing.   Abdominal:      General: There is distension.      Tenderness: There is no abdominal tenderness. There is no guarding.   Musculoskeletal:      Cervical back: Normal range of motion and neck supple.      Right lower leg: Edema (1+ pitting) present.      Left lower leg: Edema (1+ pitting) present.   Skin:     General: Skin is cool and dry.   Neurological:      General: No focal deficit present.      Mental Status: He is oriented to person, place, and time.   Psychiatric:         Mood and Affect: Mood normal.         Behavior: Behavior normal.          Vents:  Oxygen  Concentration (%): 30 (06/21/23 0400)  Lines/Drains/Airways       Drain  Duration                  Hemodialysis AV Fistula Left upper arm -- days    Male External Urinary Catheter 06/20/23 1931 Medium <1 day    Male External Urinary Catheter 06/21/23 0103 Medium <1 day              Peripheral Intravenous Line  Duration                  Peripheral IV - Single Lumen 06/21/23 0440 18 G Anterior;Right Upper Arm <1 day                  Significant Labs:    CBC/Anemia Profile:  Recent Labs   Lab 06/20/23 1814 06/20/23  1820 06/20/23  2232 06/21/23  0428   WBC 3.26*  --  4.72 3.36*   HGB 12.3*  --  13.4* 12.8*   HCT 38.8* 42 42.4 40.6     --  161 175   MCV 95  --  94 96   RDW 14.3  --  14.4 14.5        Chemistries:  Recent Labs   Lab 06/20/23 1814 06/20/23 2232   * 133*   K 3.5 4.2   CL 92* 95   CO2 25 21*   BUN 25* 25*   CREATININE 3.2* 3.5*   CALCIUM 8.1* 8.1*   ALBUMIN 2.8* 2.8*   PROT 6.1 6.4   BILITOT 0.7 0.7   ALKPHOS 184* 185*   ALT 33 36   AST 27 33   MG 2.1 2.2       ABGs:   Recent Labs   Lab 06/21/23  0431   PH 7.266*   PCO2 71.8*   HCO3 32.6*   POCSATURATED 9*   BE 6     Lactic Acid:   Recent Labs   Lab 06/20/23 2232   LACTATE 3.0*       Significant Imaging: I have reviewed all pertinent imaging results/findings within the past 24 hours.    X-Ray Abdomen AP 1 View (KUB)   Final Result      Cardiomegaly with probable bibasilar pulmonary edema and small bilateral pleural effusions, though with mildly improved bibasilar aeration when compared with 04/02/2023.      Nonobstructive bowel gas pattern with findings which may suggest underlying ascites.         Electronically signed by: Aguilar Garcia MD   Date:    06/20/2023   Time:    23:55      CT Head Without Contrast   Final Result      No convincing acute intracranial abnormalities as imaged allowing for significant artifact.      Left mastoiditis.      Patchy deep white matter low density as seen with microvascular chronic ischemic changes.       Remote lacunar infarct in the right basal ganglia.         Electronically signed by: Rhina Duenas   Date:    06/20/2023   Time:    21:26      X-Ray Chest AP Portable   Final Result      1. Pulmonary findings suggest edema noting left pleural effusion.  Developing consolidation not excluded.  Correlation and follow-up is advised.         Electronically signed by: Juventino Castro MD   Date:    06/20/2023   Time:    20:33

## 2023-06-21 NOTE — ASSESSMENT & PLAN NOTE
-- continue home eliquis on admission   Physical Medicine & Rehabilitation  Progress Note    5/23/2021 2:27 PM     CC: Ambulatory and ADL dysfunction due to left nondominant hemiplegia due to ischemic CVA    Subjective:   No complaints. No pain. Bowels and bladder okay last BM 5/22. ROS:  Denies fevers, chills, sweats. No chest pain, palpitations, lightheadedness. Denies coughing, wheezing or shortness of breath. Denies abdominal pain, nausea, diarrhea or constipation. No new areas of joint pain. Denies new areas of numbness or weakness. Denies new anxiety or depression issues. No new skin problems. Rehabilitation:   PT:  Restrictions/Precautions: Fall Risk  Implants present? :  (loop recorder)  Other position/activity restrictions: significant L neglect with LUE/LLE deficits   Transfers  Sit to Stand: 2 Person Assistance, Minimal Assistance  Stand to sit: 2 Person Assistance, Minimal Assistance  Bed to Chair: Dependent/Total Maged Raider)  Stand Pivot Transfers: Dependent/Total Maged Raider)  Squat Pivot Transfers: Maximum Assistance (Impulsive. Assist c positioning, set-up; safety cues)  Lateral Transfers: Minimal Assistance (slide board, A with positioning, set-up, L LE, safety cues)  Comment: L neglect improving: Pt reponds to cues for L UE safety, positioning >50%; Pt demos a left lateral lean, cues with improvements to fix. Min A for safety. Ambulation 1  Surface: level tile  Device:  (walker lift)  Other Apparatus: Left, Slider, AFO, Wheelchair follow  Assistance: 2 Person assistance, Maximum assistance (+ 3rd assist to steer walker)  Quality of Gait: Demonstrated increased hip flexion today during L LE swing,  but continues to need assist to advance L LE , but largely dependent to maintain L knee extension. Fair core stabilization & weight shift. Pt ambulates with R LE too close tomidline, max cues to widen NIGHAT with little carry over.    Gait Deviations: Decreased step length, Slow Juanita, Decreased step height  Distance: 60 ft  Comments: Working on wt shifting and weightbearing into L LE. Placing L foot each step, providing L knee stability and L hip extension during stance phase. Pt demonstrates Fair but fleeting control of hip rotation/extension. OT:  ADL  Equipment Provided: Reacher  Feeding: Setup, Pureed diet  Grooming: Setup, Verbal cueing (seated sinkside, oral care and washing face)  UE Bathing: None (declines)  LE Bathing: None (declines)  UE Dressing: Minimal assistance, Increased time to complete, Verbal cueing (VCs for lynette tech when threading RUE/LUE, adjustments)  LE Dressing: Maximum assistance (max A for TEDS, shoes and R AFO, declines to change pants)  Toileting: None  Additional Comments: Grooming: moderate cues to locate toothpaste on L side of shelf, LBB: pt addresses LLE as able without cues this date, LBD: writer reinforced education on use of reacher to initiate/thread LEs, education to utilize RLE to lift LLE, increased cues and time to complete this technique. Pt participates in pulling up brief/pants over hips however requires assist for completion, pt with increased left lean during this task. Pt hesitant to participate however agreeable with encouragement.           Balance  Sitting Balance: Stand by assistance (seated EOB with LUE support on bed)  Standing Balance: Dependent/Total (min/CGA in nilson stedy)   Standing Balance  Time: AM: <1min x2  Activity: AM: transfers using nilson stedy  Comment: no LOB Noted but req V/T cues to maintain midline in nilson stedy  Functional Mobility  Functional - Mobility Device: Wheelchair  Activity: To/from bathroom  Assist Level: Minimal assistance  Functional Mobility Comments: pt navigates herself from bathroom at wheelchair level     Bed mobility  Bridging: Minimal assistance (positioning L LE)  Rolling to Left: Minimal assistance  Rolling to Right: Maximum assistance  Supine to Sit: Minimal assistance, Moderate assistance (A to lift/move LLE with attempts using RLE, trunk control/krause)  Sit to Supine: Minimal assistance (A trunk control and LLE into bed)  Scooting: Minimal assistance (hips EOB)  Comment: pt educated on LUE protection/awareness, poor/fair carryover noted  Transfers  Stand Step Transfers: Dependent/Total  Sit to stand: Dependent/Total (min A nilson stedy)  Stand to sit: Dependent/Total (mod/min A nilson stedy, VCs for controlled sitting)  Transfer Comments: pt impulsive req cuing to wait for assist   Toilet Transfers  Toilet - Technique:  (nilson stedy )  Equipment Used: Raised toilet seat without rails  Toilet Transfer: Dependent/Total (nilson stedy, min-mod A)  Toilet Transfers Comments: Completed with use of nilson stedy with min-mod A to complete. Wheelchair Bed Transfers  Wheelchair/Bed - Technique:  Rayetta Shark stedy)  Equipment Used: Bed, Wheelchair, Other (nilson steady )  Level of Asssistance: Dependent/Total (Min A x 2 with nilson stedy)      ST:     Attention:  Min verbal cues needed for increased attention to midline, pt. c extreme L sided neglect and min A to redirect to task overall -- looking at TV on R side frequently during session.      Recall: Per chart, ST ordered to evaluate and treat cognition d/t daughter concerns re: some cognitive deficits (memory). Pt's daughter present during session and reporting noticed moments of confusion, specifically short-term recall (e.g., Pt. unable to recall that she has PT 2x per day, unable to recall month, and information shared during a conversation on phone previously). Pt. daughter stating noticed these changes, \"for 2-3 days\" and currently, \"she's back to normal now\". Per ST evaluation from 05/10, Pt. demonstrated moderately impaired recall and currently has ST goals targeting recall --       Goal 1: Recall 3-5 units with and without distraction with 90% accuracy  Goal 2: Provide comp. recall strategeis to aide in recall     ST to continue to address memory deficits in POC. Pt.  And Pt's daughter verbalized understanding and agreeable.       Pt. Education provided re: compensatory strategies to facilitate recall (e.g., ID key words, repetition and rehearsal). Pt. Verbalized understanding and facilitating strategy to complete the following task:    Paragraph recall (3-6 sentences): 100% accuracy praveen     Organization: n/a     Problem Solving/Reasoning:  n/a     Other:  Pt. Completed oral motor exercises x6, 10-15 reps each with min cues. Therapeutic feeding trials of advanced texture (regular solid -- toast and alexis cracker) presented. Mildly prolonged mastication noted with min amount lingual residue, cleared with double swallow and liquid wash. No overt s/s of aspiration noted. OK to advance to Soft and bite-sized diet with thin liquids. Writer updated diet order in Epic. ST to continue to follow and monitor diet tolerance. Objective:  /66   Pulse 89   Temp 98.7 °F (37.1 °C) (Oral)   Resp 16   Ht 5' 8\" (1.727 m)   Wt 191 lb 8 oz (86.9 kg)   SpO2 91%   BMI 29.12 kg/m²  I Body mass index is 29.12 kg/m². I   Wt Readings from Last 1 Encounters:   05/10/21 191 lb 8 oz (86.9 kg)      Temp (24hrs), Av.7 °F (37.1 °C), Min:98.6 °F (37 °C), Max:98.7 °F (37.1 °C)         GEN: well developed, well nourished, no acute distress  HEENT: Normocephalic atraumatic, EOMI, mucous membranes pink and moist  CV: RRR, no murmurs, rubs or gallops  PULM: CTAB, no rales or rhonchi. Respirations WNL and unlabored  ABD: soft, NT, ND, +BS and equal  NEURO: A&O x3. Sensation intact to light touch. Alert and oriented x3 knew year, president location, follows command, - neuro exam no change, follows commands, names objects  MSK: 4+/5 right upper lower extremity, dense left hemiparesis distal left upper lower extremity 0/5 proximal left upper extremity 1/5, left hip flexion 1-2/5, no tone-no change  EXTREMITIES: No calf tenderness to palpation bilaterally.  No edema BLEs, range of motion and palpation of joints and spine with no increased pain, no swelling or tenderness  SKIN: warm dry and intact with good turgor  PSYCH: appropriately interactive. Affect WNL. Medications   Scheduled Meds:   FLUoxetine  20 mg Oral Daily    amitriptyline  25 mg Oral Nightly    heparin (porcine)  5,000 Units Subcutaneous 3 times per day    acetaminophen  1,000 mg Oral 3 times per day    amLODIPine  5 mg Oral Daily    aspirin  81 mg Oral Daily    atorvastatin  80 mg Oral Daily    budesonide-formoterol  2 puff Inhalation BID    insulin lispro  0-3 Units Subcutaneous Nightly    insulin lispro  0-6 Units Subcutaneous TID WC    levothyroxine  25 mcg Oral Daily    nicotine  1 patch Transdermal Daily    tiotropium  2 puff Inhalation Daily    polyethylene glycol  17 g Oral Daily     Continuous Infusions:   sodium chloride       PRN Meds:.polyvinyl alcohol, sodium chloride flush, promethazine **OR** ondansetron, sodium chloride, albuterol, dextrose, glucagon (rDNA), glucose, senna, bisacodyl     Diagnostics:     CBC:   No results for input(s): WBC, RBC, HGB, HCT, MCV, RDW, PLT in the last 72 hours. BMP:   No results for input(s): NA, K, CL, CO2, PHOS, BUN, CREATININE in the last 72 hours. Invalid input(s): CA  BNP: No results for input(s): BNP in the last 72 hours. PT/INR: No results for input(s): PROTIME, INR in the last 72 hours. APTT: No results for input(s): APTT in the last 72 hours. CARDIAC ENZYMES: No results for input(s): CKMB, CKMBINDEX, TROPONINT in the last 72 hours. Invalid input(s): CKTOTAL;3  FASTING LIPID PANEL:  Lab Results   Component Value Date    CHOL 206 (H) 05/05/2021    HDL 29 (L) 05/05/2021    TRIG 185 (H) 05/05/2021     LIVER PROFILE: No results for input(s): AST, ALT, ALB, BILIDIR, BILITOT, ALKPHOS in the last 72 hours. I/O (24Hr):   No intake or output data in the 24 hours ending 05/23/21 1427    Glu last 24 hour  Recent Labs     05/22/21  1609 05/22/21  1926 05/23/21  0619 05/23/21  1109   POCGLU 156* 128* 107* 141*       No results for input(s): CLARITYU, COLORU, PHUR, SPECGRAV, PROTEINU, RBCUA, BLOODU, BACTERIA, NITRU, WBCUA, LEUKOCYTESUR, YEAST, GLUCOSEU, BILIRUBINUR in the last 72 hours.         Impression/Plan:   Impaired ADLs, gait, and mobility due to:        1. Ischemic R MCA CVA with hemorrhagic conversion and L non-dominant hemiparesis:  PT/OT for gait, mobility, strengthening, endurance, ADLs, and self care. On ASA, atorvastatin. Sling only to be used when patient is ambulating with therapy. -Discussed with patient and therapist.  Handicap placard on discharge. Noted discharge plan 6/2, family to place ramp. Per  patient does not have insurance but will need to do family training, teach home exercise program  2. Status post fall 5/16-currently no residual, continue to monitor  3. Headache: Has Tylenol routine TID. Improved on amitriptyline. 4. Dry eyes: has natural tears. 5. Cognitive impairment: SLP treating, discussed with daughter 5/22  6. Dysphagia/aphasia: SLP treating. Diet upgraded, continue aspiration precautions  7. HTN/Hyperlipidemia: on amlodipine  8. DM: on insulin sliding scale, as above  9. COPD/asthma: on albuterol nebulizers prn, on symbicort BID, spiriva  10. Adjustment disorder with depressed mood: started fluoxetine 5/15, BMP okay  11. Esophageal Ring: Will monitor - on dysphagia diet  12. Hypothyroidism: on levothyroxine  13. Leukocytosis-improving  14. Nicotine dependence: on Nicoderm  15. Bowel Management: Miralax daily, senokot prn, dulcolax prn. 16. DVT Prophylaxis:  heparin, SCD's while in bed and MAXIM's   17. Internal medicine for medical management      Dona Allen. Rola Ponce MD       This note is created with the assistance of a speech recognition program.  While intending to generate a document that actually reflects the content of the visit, the document can still have some errors including those of syntax and sound a like substitutions which may escape proof reading.   In such instances, actual meaning can be extrapolated by contextual diversion

## 2023-06-21 NOTE — ASSESSMENT & PLAN NOTE
Outpatient HD Information:    -Outpatient HD unit: [facility]   -HD tx days: MWF   -Last HD tx prior to presentation: 6/16/23  -HD access: LUE AVF   -HD modality: iHD   -Residual urine: Anuric       Plan/Recommendations:    -S/P HD this am with a net UF of 3L and subsequent improvement in oxygenation. Plan to continue HD MWF.   -Renal diet  -Strict I/O's and daily weights  -Daily renal function panels   -Renally dose meds

## 2023-06-21 NOTE — ASSESSMENT & PLAN NOTE
-- hx of COPD on home Spiriva and rescue albuterol inhaler  -- s/p duo-nebs tx as well as dose of solumedrol in the ED  -- no evidence to suggest acute COPD exacerbation on admission  -- prn duo-nebs while admitted for wheezing  -- goal O2 sat 88-92%

## 2023-06-21 NOTE — ASSESSMENT & PLAN NOTE
-- patient with hx of heart failure with concurrent ESRD requiring HD. Patient sxs and clinical exam c/w acute decompensated HF.  -- on heart failure pathway  -- volume removal via HD  -- resume home Imdur, coreg, lisinopril  -- cardiac diet    Echo  Result Date: 3/20/2023  · The left ventricle is normal in size with normal systolic function.  · The estimated ejection fraction is 55%.  · Grade II left ventricular diastolic dysfunction.  · Moderate right ventricular enlargement with moderately reduced right   ventricular systolic function.  · Biatrial enlargement.  · Interatrial septum bows to left, consider elevated right atrial   pressure.  · Mild tricuspid regurgitation.  · The estimated PA systolic pressure is 54 mmHg.  · There is pulmonary hypertension.  · Elevated central venous pressure (15 mmHg).  · Small circumferential pericardial effusion.  · There are bilateral pleural effusions.  · There is ascites present.

## 2023-06-21 NOTE — ASSESSMENT & PLAN NOTE
-- on admission patient with notable L facial swelling. CTH showing new L mastoiditis  -- ENT consulted on admission  -- will administer ceftriaxone 1g qd for 3 days

## 2023-06-21 NOTE — NURSING
Pt completed 3 hours of HD. Tolerated well. VSS. NAD. Net 3 liters fluid removed. Blood returned. Lines disconnected and flushed with NS. Needles pulled and manual pressure held until hemostasis achieved. Report given to ADELITA Hall.

## 2023-06-21 NOTE — SUBJECTIVE & OBJECTIVE
Past Medical History:   Diagnosis Date    Chronic kidney disease-mineral and bone disorder 10/12/2022    COPD (chronic obstructive pulmonary disease)     Diabetes mellitus type 1     ESRD on dialysis     Hypertension     Hypervolemia 2/22/2023    Hyponatremia 3/4/2023    SOB (shortness of breath) 10/12/2022    Patient with history COPD treated with Ellipta the albuterol, fluticasone-salmeterol tachypneic in the ED saturating 94% and 6 L on nasal cannula, patient does not know how many  he uses it is in nursing home.    -duo nebs Q 4  Given that patient is a poor historian, and in the setting of left lower extremity edema and history of coagulopathy PE cannot be ruled out.  Will workup for possible infec       Past Surgical History:   Procedure Laterality Date    AV FISTULA PLACEMENT         Review of patient's allergies indicates:  No Known Allergies  Current Facility-Administered Medications   Medication Frequency    albuterol-ipratropium 2.5 mg-0.5 mg/3 mL nebulizer solution 3 mL Q4H WAKE    apixaban tablet 5 mg BID    artificial tears 0.5 % ophthalmic solution 1 drop 6x Daily    aspirin EC tablet 81 mg Daily    atorvastatin tablet 40 mg Daily    carvediloL tablet 6.25 mg BID    [START ON 6/22/2023] cefTRIAXone (ROCEPHIN) 2 g in dextrose 5 % in water (D5W) 5 % 100 mL IVPB (MB+) Q24H    cetirizine tablet 10 mg Daily PRN    erythromycin 5 mg/gram (0.5 %) ophthalmic ointment Q8H    FLUoxetine capsule 40 mg Daily    furosemide injection 80 mg ED 1 Time    heparin (porcine) injection 1,000 Units PRN    insulin aspart U-100 pen 6 Units TIDWM    insulin detemir U-100 (Levemir) pen 3 Units QHS    insulin detemir U-100 (Levemir) pen 6 Units Daily    isosorbide mononitrate 24 hr tablet 30 mg Daily    lisinopriL tablet 40 mg QHS    melatonin tablet 3 mg Nightly PRN    metroNIDAZOLE tablet 500 mg Q8H    mupirocin 2 % ointment BID    NIFEdipine 24 hr tablet 60 mg BID    sevelamer carbonate tablet 800 mg TID    sodium chloride  0.9% bolus 250 mL 250 mL PRN    sodium chloride 0.9% flush 10 mL PRN    sodium chloride 0.9% flush 10 mL PRN     Family History       Problem Relation (Age of Onset)    Diabetes Mother          Tobacco Use    Smoking status: Never    Smokeless tobacco: Never   Substance and Sexual Activity    Alcohol use: Not Currently    Drug use: Not on file    Sexual activity: Not Currently     Review of Systems   Unable to perform ROS: Acuity of condition (On BiPAP)   Objective:     Vital Signs (Most Recent):  Temp: 98.2 °F (36.8 °C) (06/21/23 1100)  Pulse: (!) 57 (06/21/23 1300)  Resp: (!) 25 (06/21/23 1300)  BP: (!) 158/89 (06/21/23 1300)  SpO2: (!) 94 % (06/21/23 1300) Vital Signs (24h Range):  Temp:  [96.6 °F (35.9 °C)-98.7 °F (37.1 °C)] 98.2 °F (36.8 °C)  Pulse:  [55-59] 57  Resp:  [14-30] 25  SpO2:  [90 %-100 %] 94 %  BP: (134-181)/() 158/89     Weight: 65.3 kg (143 lb 15.4 oz) (06/21/23 1330)  Body mass index is 21.26 kg/m².  Body surface area is 1.78 meters squared.    I/O last 3 completed shifts:  In: 100 [IV Piggyback:100]  Out: -      Physical Exam  Constitutional:       Appearance: He is ill-appearing.   HENT:      Head: Normocephalic and atraumatic.      Nose: Nose normal.      Mouth/Throat:      Mouth: Mucous membranes are moist.      Pharynx: Oropharynx is clear.   Eyes:      Conjunctiva/sclera: Conjunctivae normal.   Cardiovascular:      Rate and Rhythm: Regular rhythm. Bradycardia present.      Comments: LUE AVF with +thrill and +bruit   Pulmonary:      Breath sounds: Rales present.      Comments: BiPAP  Abdominal:      General: Abdomen is flat.   Musculoskeletal:      Cervical back: Neck supple.      Right lower leg: No edema.      Left lower leg: No edema.   Skin:     General: Skin is warm and dry.   Neurological:      General: No focal deficit present.      Mental Status: He is alert.   Psychiatric:         Mood and Affect: Mood normal.         Behavior: Behavior normal.        Significant Labs:  CBC:    Recent Labs   Lab 06/21/23 0428   WBC 3.36*   RBC 4.25*   HGB 12.8*   HCT 40.6      MCV 96   MCH 30.1   MCHC 31.5*     CMP:   Recent Labs   Lab 06/21/23 0428   *   CALCIUM 8.2*   ALBUMIN 2.9*   PROT 6.3   *   K 3.9   CO2 30*   CL 92*   BUN 28*   CREATININE 3.7*   ALKPHOS 195*   ALT 32   AST 27   BILITOT 0.9     All labs within the past 24 hours have been reviewed.

## 2023-06-21 NOTE — PLAN OF CARE
[facility] - Cardiac Medical ICU  Initial Discharge Assessment       Primary Care Provider: Primary Doctor No    Admission Diagnosis: Shortness of breath [R06.02]  Acute pulmonary edema [J81.0]  Altered mental status [R41.82]  End-stage renal disease on hemodialysis [N18.6, Z99.2]  CO2 narcosis [R06.89]    Admission Date: 6/20/2023  Expected Discharge Date: 6/27/2023    Transition of Care Barriers: None    Payor: MEDICARE / Plan: MEDICARE PART A & B / Product Type: Government /     Extended Emergency Contact Information  Primary Emergency Contact: Kassandra Leon  Mobile Phone: [phone]  Relation: Mother    Discharge Plan A: Return to nursing home  Discharge Plan B: Skilled Nursing Facility      [facility]  [address]  [facility] [address]  Phone: [phone] Fax: [phone]      Initial Assessment (most recent)       Adult Discharge Assessment - 06/21/23 1050          Discharge Assessment    Assessment Type Discharge Planning Assessment     Confirmed/corrected address, phone number and insurance Yes     Confirmed Demographics Correct on Facesheet     Source of Information health care advocate     If unable to respond/provide information was family/caregiver contacted? Yes     Contact Name/Number Marylin admission coordinator/# [phone]     When was your last doctors appointment? --   PCP appt in nursing facility is twice a month    Does patient/caregiver understand observation status No     Was observation education provided? No   n/a    Communicated GERA with patient/caregiver Date not available/Unable to determine     Reason For Admission Acute on chronic respiratory failure with hypoxia and hypercapnia     People in Home facility resident     Facility Arrived From: [facility]     Do you expect to return to your current living situation? Yes     Do you have help at home or someone to help you manage your care at home? Yes     Who are your caregiver(s) and  their phone number(s)? Patient is a resident of [facility]/# [phone]     Prior to hospitilization cognitive status: Unable to Assess     Current cognitive status: Unable to Assess     Walking or Climbing Stairs ambulation difficulty, dependent     Mobility Management Wheelchair     Dressing/Bathing dressing difficulty, requires equipment     Dressing/Bathing Management patient is w/c bound and requires assistance from staff     Home Accessibility wheelchair accessible     Home Layout Able to live on 1st floor     Equipment Currently Used at Home wheelchair     Readmission within 30 days? No     Patient currently being followed by outpatient case management? No     Do you currently have service(s) that help you manage your care at home? Yes     How Many hours does patient receive services --   24/7    Name and Contact number of agency nursing home staff of [facility]/# [phone]     Is the pt/caregiver preference to resume services with current agency Yes     Do you take prescription medications? Yes     Do you have prescription coverage? Yes     Coverage Medicare A & B/Medicaid of [place]     Do you have any problems affording any of your prescribed medications? No     Is the patient taking medications as prescribed? yes     Who is going to help you get home at discharge? EMS     How do you get to doctors appointments? other (see comments)   Physician/PCP/PA/NP come to the nursing home    Are you on dialysis? Yes     Dialysis Name and Scheduled days [facility]/MWF @ 11:45 am     Do you take coumadin? No   Eliquis    Discharge Plan A Return to nursing home     Discharge Plan B Skilled Nursing Facility     DME Needed Upon Discharge  none     Discharge Plan discussed with: --   Admission coordinator    Transition of Care Barriers None        Physical Activity    On average, how many days per week do you engage in moderate to strenuous exercise (like a brisk walk)? 0 days     On  average, how many minutes do you engage in exercise at this level? 0 min        Financial Resource Strain    How hard is it for you to pay for the very basics like food, housing, medical care, and heating? Patient refused        Housing Stability    In the last 12 months, was there a time when you were not able to pay the mortgage or rent on time? No     In the last 12 months, how many places have you lived? 1     In the last 12 months, was there a time when you did not have a steady place to sleep or slept in a shelter (including now)? No        Transportation Needs    In the past 12 months, has lack of transportation kept you from medical appointments or from getting medications? No     In the past 12 months, has lack of transportation kept you from meetings, work, or from getting things needed for daily living? No        Food Insecurity    Within the past 12 months, you worried that your food would run out before you got the money to buy more. Never true     Within the past 12 months, the food you bought just didn't last and you didn't have money to get more. Never true        Stress    Do you feel stress - tense, restless, nervous, or anxious, or unable to sleep at night because your mind is troubled all the time - these days? Patient refused        Social Connections    In a typical week, how many times do you talk on the phone with family, friends, or neighbors? Patient refused     How often do you get together with friends or relatives? Patient refused     How often do you attend Jehovah's witness or Worship services? Patient refused     Do you belong to any clubs or organizations such as Jehovah's witness groups, unions, fraternal or athletic groups, or school groups? No     How often do you attend meetings of the clubs or organizations you belong to? Never     Are you , , , , never , or living with a partner? Never         Alcohol Use    Q1: How often do you have a drink containing  alcohol? Never     Q2: How many drinks containing alcohol do you have on a typical day when you are drinking? Patient does not drink     Q3: How often do you have six or more drinks on one occasion? Never        OTHER    Name(s) of People in Home Patient is [facility] care at [facility].                   Spoke to Marylin, admission coordinator, for [facility].  Patient lives in the nursing home. Post hospital stay nursing home staff will be his support person and help in the home.  Patient has transportation at discharge with EMS.  There have been no hospitalizations within the last 30 days per staff. Verified patient's PCP and preferred Pharmacy.  Medical records indicate not on Coumadin but takes Eliquis and is receiving dialysis.  All questions answered regarding Case Management Discharge Planning, Marylin verbalized understanding.  SW will continue to follow while patient on MICU.    Jayesh Cortes LMSW  [facility]  [phone]

## 2023-06-21 NOTE — PROVIDER PROGRESS NOTES - EMERGENCY DEPT.
Encounter Date: 6/20/2023    ED Physician Progress Notes          ED STAFF ATTENDING PHYSICIAN HAND-OFF NOTE:  11:58 PM 6/20/2023  Cosme Lewis is a 57 y.o. male who presented to the ED on 6/20/2023 and has been managed by , who presnts from [facility] for SOB and AMS. I assumed care of patient from off-going ED physician team at 11:58 PM pending diuresis, ammonia, + Admission for CHF.    On my evaluation, Cosme Lewis appears well, hemodynamically stable and in NAD. Thus far, Cosme Lewis has received:  Medications   furosemide injection 80 mg (has no administration in time range)       On my exam, I appreciate:  BP (!) 151/88   Pulse (!) 56   Temp 98.2 °F (36.8 °C)   Resp (!) 22   SpO2 98%              I have discussed and counseled Cosme Lewis regarding exam, results, diagnosis, treatment, and plan.  ______________________  Raj Banks MD, [facility]  Emergency Medicine Staff  11:58 PM 6/20/2023

## 2023-06-21 NOTE — EICU
Intervention Initiated From:  Bedside    Natty intervened regarding:  Time-Out      Comments: Called to the bedside to assist Dr. Mitchell with a timeout for paracentesis.

## 2023-06-21 NOTE — ASSESSMENT & PLAN NOTE
-- most recent A1C 9.5 on 3/6/2023  -- repeat A1C ordered on admission  -- hold oral antihyperglycemics on admit  -- will continue home insulin regimen at reduced dose on admission  -- current regimen: detemir 6u QD / detemir 3u QHS / aspart 6u TIDWM

## 2023-06-21 NOTE — ASSESSMENT & PLAN NOTE
-- on multiple medications currently including coreg, nifedipine, lisinopril, imdur  -- hypertensive on admit to 170s systolic, which could be contributing factor to pulmonary edema and respiratory failure  -- will resume all home antihypertensives for strict BP control

## 2023-06-21 NOTE — ED NOTES
Pt cleaned x2 assist, bed pad and brief change, complete linen changed, pt repositioned for comfort. Pt in hospital gown, on cardiac monitor, side rails up x2, call light in reach, white board updated, condom cath in place. Care on going

## 2023-06-21 NOTE — ASSESSMENT & PLAN NOTE
-- most recent LDL 40 on 10/12/2023  -- continue home atorvastatin 40mg qd while admitted  -- repeat lipid panel ordered on admission    The ASCVD Risk score (Teo WELLINGTON, et al., 2019) failed to calculate for the following reasons:    The valid total cholesterol range is 130 to 320 mg/dL

## 2023-06-21 NOTE — ASSESSMENT & PLAN NOTE
Nutrition Problem:  Moderate Protein-Calorie Malnutrition  Malnutrition in the context of Chronic Illness/Injury    Related to (etiology):  Inability to consume sufficient energy     Signs and Symptoms (as evidenced by):  Body Fat Depletion: mild and moderate depletion of orbitals   Muscle Mass Depletion: mild and moderate depletion of temples, clavicle region and lower extremities   Weight Loss: 10% x 6 months    Interventions(treatment strategy):  Collaboration of nutrition care w/ other providers    Nutrition Diagnosis Status:  New

## 2023-06-21 NOTE — NURSING
Arrived at bedside for 1:1 HD. Pt in NAD, VSS, on bipap, nods appropriately. Left upper arm AVF cannulated with 15g needles x2 using aseptic technique. Lines connected and secured- tx initiated at 0734.

## 2023-06-21 NOTE — H&P
[facility] - Cardiac Medical ICU  Critical Care Medicine  History & Physical    Patient Name: Cosme Lewis  MRN: [id number]  Admission Date: 6/20/2023  Hospital Length of Stay: 0 days  Code Status: Full Code  Attending Physician: Jhonny Lezama MD   Primary Care Provider: Primary Doctor No   Principal Problem: Acute on chronic respiratory failure with hypoxia and hypercapnia    Subjective:     HPI:  Mr. Cosme Lewis is a 58 y/o M with hx of HFpEF (LVEF 55%, Grade II DD on echo 3/20/23), chronic RF on home O2, IDDM2, ESRD on HD, COPD, HTN, muliple PEs on eliquis who presented to the ED from [facility] for evaluation of AMS and hypoxia. He was in his usual state of health until yesterday afternoon when staff noticed he was more lethargic than normal, and was satting in the 80s.     In the ED, patient afebrile, hypertensive to 170s systolic, HR 50s, hypoxic requiring 10L NRB. CBC grossly unchanged from BL with chronic leukopenia and anemia. CMP also grossly unchanged from BL, with known mild hyponatremia. Lactic elevated to 3.0. BNP 3200. Trops wnl. Procal and CRP elevated. CXR demonstrating bilateral edema as well as L pleural effusion. CTH showing L mastoiditis, otherwise no acute intracranial abnormality. Initial VBG demonstrating pH 7.30/PCO2 68. Patient given 80 lasix IV, duo-nebs, ceftriaxone, and solumedrol. repeat VBG with pH down to 7.27, CO2 76. Patient placed on bipap and MICU consulted for management of severe respiratory acidosis s/o HFE and COPD.          Hospital/ICU Course:  No notes on file     Past Medical History:   Diagnosis Date    Chronic kidney disease-mineral and bone disorder 10/12/2022    COPD (chronic obstructive pulmonary disease)     Diabetes mellitus type 1     ESRD on dialysis     Hypertension     Hypervolemia 2/22/2023    Hyponatremia 3/4/2023    SOB (shortness of breath) 10/12/2022    Patient with history COPD treated with Ellipta the albuterol, fluticasone-salmeterol tachypneic in  the ED saturating 94% and 6 L on nasal cannula, patient does not know how many  he uses it is in nursing home.    -duo nebs Q 4  Given that patient is a poor historian, and in the setting of left lower extremity edema and history of coagulopathy PE cannot be ruled out.  Will workup for possible infec       Past Surgical History:   Procedure Laterality Date    AV FISTULA PLACEMENT         Review of patient's allergies indicates:  No Known Allergies    Family History       Problem Relation (Age of Onset)    Diabetes Mother          Tobacco Use    Smoking status: Never    Smokeless tobacco: Never   Substance and Sexual Activity    Alcohol use: Not Currently    Drug use: Not on file    Sexual activity: Not Currently      Review of Systems   Constitutional:  Positive for fatigue. Negative for chills and fever.   HENT:  Positive for facial swelling. Negative for congestion and sore throat.    Respiratory:  Positive for shortness of breath. Negative for cough.    Cardiovascular:  Positive for leg swelling. Negative for chest pain and palpitations.   Gastrointestinal:  Negative for abdominal pain, nausea and vomiting.   Genitourinary:  Negative for dysuria and frequency.   Musculoskeletal:  Negative for arthralgias and back pain.   Skin:  Negative for rash and wound.   Neurological:  Negative for dizziness and headaches.   Psychiatric/Behavioral:  Negative for agitation and confusion.    Objective:     Vital Signs (Most Recent):  Temp: 97.9 °F (36.6 °C) (06/21/23 0309)  Pulse: (!) 58 (06/21/23 0431)  Resp: 16 (06/21/23 0431)  BP: (!) 159/89 (06/21/23 0400)  SpO2: (!) 90 % (06/21/23 0431) Vital Signs (24h Range):  Temp:  [96.6 °F (35.9 °C)-98.7 °F (37.1 °C)] 97.9 °F (36.6 °C)  Pulse:  [55-59] 58  Resp:  [14-25] 16  SpO2:  [90 %-100 %] 90 %  BP: (137-181)/() 159/89   Weight: 65.3 kg (143 lb 15.4 oz)  Body mass index is 21.26 kg/m².    No intake or output data in the 24 hours ending 06/21/23 0444       Physical  Exam  Vitals and nursing note reviewed.   Constitutional:       General: He is not in acute distress.     Appearance: He is ill-appearing. He is not toxic-appearing or diaphoretic.   HENT:      Head: Atraumatic.      Comments: Notable swelling to L side of face     Nose: Nose normal. No congestion or rhinorrhea.      Mouth/Throat:      Mouth: Mucous membranes are dry.      Pharynx: Oropharynx is clear. No oropharyngeal exudate or posterior oropharyngeal erythema.   Eyes:      General: No scleral icterus.     Extraocular Movements: Extraocular movements intact.      Conjunctiva/sclera: Conjunctivae normal.      Pupils: Pupils are equal, round, and reactive to light.   Cardiovascular:      Rate and Rhythm: Normal rate and regular rhythm.      Pulses: Normal pulses.      Heart sounds: Normal heart sounds. No murmur heard.    No friction rub. No gallop.   Pulmonary:      Effort: No respiratory distress.      Breath sounds: Rales present. No wheezing.   Abdominal:      General: There is distension.      Tenderness: There is no abdominal tenderness. There is no guarding.   Musculoskeletal:      Cervical back: Normal range of motion and neck supple.      Right lower leg: Edema (1+ pitting) present.      Left lower leg: Edema (1+ pitting) present.   Skin:     General: Skin is cool and dry.   Neurological:      General: No focal deficit present.      Mental Status: He is oriented to person, place, and time.   Psychiatric:         Mood and Affect: Mood normal.         Behavior: Behavior normal.          Vents:  Oxygen Concentration (%): 30 (06/21/23 0400)  Lines/Drains/Airways       Drain  Duration                  Hemodialysis AV Fistula Left upper arm -- days    Male External Urinary Catheter 06/20/23 1931 Medium <1 day    Male External Urinary Catheter 06/21/23 0103 Medium <1 day              Peripheral Intravenous Line  Duration                  Peripheral IV - Single Lumen 06/21/23 0440 18 G Anterior;Right Upper Arm <1  day                  Significant Labs:    CBC/Anemia Profile:  Recent Labs   Lab 06/20/23  1814 06/20/23  1820 06/20/23  2232 06/21/23  0428   WBC 3.26*  --  4.72 3.36*   HGB 12.3*  --  13.4* 12.8*   HCT 38.8* 42 42.4 40.6     --  161 175   MCV 95  --  94 96   RDW 14.3  --  14.4 14.5        Chemistries:  Recent Labs   Lab 06/20/23  1814 06/20/23  2232   * 133*   K 3.5 4.2   CL 92* 95   CO2 25 21*   BUN 25* 25*   CREATININE 3.2* 3.5*   CALCIUM 8.1* 8.1*   ALBUMIN 2.8* 2.8*   PROT 6.1 6.4   BILITOT 0.7 0.7   ALKPHOS 184* 185*   ALT 33 36   AST 27 33   MG 2.1 2.2       ABGs:   Recent Labs   Lab 06/21/23  0431   PH 7.266*   PCO2 71.8*   HCO3 32.6*   POCSATURATED 9*   BE 6     Lactic Acid:   Recent Labs   Lab 06/20/23  2232   LACTATE 3.0*       Significant Imaging: I have reviewed all pertinent imaging results/findings within the past 24 hours.    X-Ray Abdomen AP 1 View (KUB)   Final Result      Cardiomegaly with probable bibasilar pulmonary edema and small bilateral pleural effusions, though with mildly improved bibasilar aeration when compared with 04/02/2023.      Nonobstructive bowel gas pattern with findings which may suggest underlying ascites.         Electronically signed by: Aguilar Garcia MD   Date:    06/20/2023   Time:    23:55      CT Head Without Contrast   Final Result      No convincing acute intracranial abnormalities as imaged allowing for significant artifact.      Left mastoiditis.      Patchy deep white matter low density as seen with microvascular chronic ischemic changes.      Remote lacunar infarct in the right basal ganglia.         Electronically signed by: Rhina Duenas   Date:    06/20/2023   Time:    21:26      X-Ray Chest AP Portable   Final Result      1. Pulmonary findings suggest edema noting left pleural effusion.  Developing consolidation not excluded.  Correlation and follow-up is advised.         Electronically signed by: Juventino Castro MD   Date:    06/20/2023    Time:    20:33            Assessment/Plan:     ENT  Mastoiditis of left side  -- on admission patient with notable L facial swelling. CTH showing new L mastoiditis  -- ENT consulted on admission  -- will administer ceftriaxone 1g qd for 3 days    Pulmonary  * Acute on chronic respiratory failure with hypoxia and hypercapnia  56 y/o M with hx of HFpEF, COPD, multiple pulmonary emboli on eliquis presenting with acute on chronic hypoxic/hypercarbic respiratory failure. On exam patient with LE edema, bilat pulm edema on CXR, hypoxic requiring supplemental O2. ABG also with notable acidosis and hypercarbia. Current problem likely 2/2 acute decompensated heart failure worsened by significant volume overload in an anuric patient requiring HD. Following several hours of bipap, patient mentation significantly improved and patient conversing at baseline.     Patient with Hypercapnic and Hypoxic Respiratory failure which is Acute on chronic.  he is on home oxygen at 2 LPM. Supplemental oxygen was provided and noted- Oxygen Concentration (%):  [30] 30    Signs/symptoms of respiratory failure include- tachypnea, increased work of breathing, respiratory distress, use of accessory muscles and lethargy. Contributing diagnoses includes - CHF Labs and images were reviewed. Patient Has recent ABG, which has been reviewed. Will treat underlying causes and adjust management of respiratory failure as follows-     Plan:  -- Volume removal via HD per nephro  -- resume home antihypertensive medications for strict BP control  -- Bipap prn for severe acidosis and hypercarbia with q4h blood gas  -- rest of care as noted under respective problems    COPD (chronic obstructive pulmonary disease)  -- hx of COPD on home Spiriva and rescue albuterol inhaler  -- s/p duo-nebs tx as well as dose of solumedrol in the ED  -- no evidence to suggest acute COPD exacerbation on admission  -- prn duo-nebs while admitted for wheezing  -- goal O2 sat  88-92%    Chronic hypoxemic respiratory failure  -- see acute on chronic respiratory failure    Cardiac/Vascular  Acute on chronic diastolic heart failure  -- patient with hx of heart failure with concurrent ESRD requiring HD. Patient sxs and clinical exam c/w acute decompensated HF.  -- on heart failure pathway  -- volume removal via HD  -- resume home Imdur, coreg, lisinopril  -- cardiac diet    Echo  Result Date: 3/20/2023  · The left ventricle is normal in size with normal systolic function.  · The estimated ejection fraction is 55%.  · Grade II left ventricular diastolic dysfunction.  · Moderate right ventricular enlargement with moderately reduced right   ventricular systolic function.  · Biatrial enlargement.  · Interatrial septum bows to left, consider elevated right atrial   pressure.  · Mild tricuspid regurgitation.  · The estimated PA systolic pressure is 54 mmHg.  · There is pulmonary hypertension.  · Elevated central venous pressure (15 mmHg).  · Small circumferential pericardial effusion.  · There are bilateral pleural effusions.  · There is ascites present.             Hypertension  -- on multiple medications currently including coreg, nifedipine, lisinopril, imdur  -- hypertensive on admit to 170s systolic, which could be contributing factor to pulmonary edema and respiratory failure  -- will resume all home antihypertensives for strict BP control     HLD (hyperlipidemia)  -- most recent LDL 40 on 10/12/2023  -- continue home atorvastatin 40mg qd while admitted  -- repeat lipid panel ordered on admission    The ASCVD Risk score (Teo WELLINGTON, et al., 2019) failed to calculate for the following reasons:    The valid total cholesterol range is 130 to 320 mg/dL      Renal/  ESRD (end stage renal disease)  -- on HD as outpatient  -- nephrology consulted on admission for urgent HD given volume overload contributing to respiratory failure  -- continue home sevelamer 800mg TID    Hematology  Multiple  subsegmental pulmonary emboli without acute cor pulmonale  -- continue home eliquis on admission    Oncology  Anemia in ESRD (end-stage renal disease)  -- Hgb on admission around baseline at 13.4  -- daily CBC  -- transfuse for Hgb < 7    Endocrine  Type 2 diabetes mellitus with hyperglycemia, with long-term current use of insulin  -- most recent A1C 9.5 on 3/6/2023  -- repeat A1C ordered on admission  -- hold oral antihyperglycemics on admit  -- will continue home insulin regimen at reduced dose on admission  -- current regimen: detemir 6u QD / detemir 3u QHS / aspart 6u TIDWM    Malnutrition of mild degree  -- nutrition consulted on admission for dietary assistance  -- cardiac diet while admitted with boost supplement        Critical Care Daily Checklist:    A: Awake: RASS Goal/Actual Goal:    Actual:     B: Spontaneous Breathing Trial Performed?     C: SAT & SBT Coordinated?  N/A                      D: Delirium: CAM-ICU     E: Early Mobility Performed? Yes   F: Feeding Goal:    Status:     Current Diet Order   Procedures    Diet Cardiac [facility]; Low Sodium,2gm; Fluid - 1500mL     Order Specific Question:   Indicate patient location for additional diet options:     Answer:   [facility]     Order Specific Question:   Additional Diet Options:     Answer:   Low Sodium,2gm     Order Specific Question:   Fluid restriction:     Answer:   Fluid - 1500mL      AS: Analgesia/Sedation none   T: Thromboembolic Prophylaxis eliquis   H: HOB > 300 Yes   U: Stress Ulcer Prophylaxis (if needed) none   G: Glucose Control detemir/aspart   B: Bowel Function Stool Occurrence: 1   I: Indwelling Catheter (Lines & Dutta) Necessity necessary   D: De-escalation of Antimicrobials/Pharmacotherapies Ctx    Plan for the day/ETD Wean bipap    Code Status:  Family/Goals of Care: Full Code         Critical secondary to Patient has a condition that poses threat to life and bodily function: Severe Respiratory Distress     Critical  care was time spent personally by me on the following activities: development of treatment plan with patient or surrogate and bedside caregivers, discussions with consultants, evaluation of patient's response to treatment, examination of patient, ordering and performing treatments and interventions, ordering and review of laboratory studies, ordering and review of radiographic studies, pulse oximetry, re-evaluation of patient's condition. This critical care time did not overlap with that of any other provider or involve time for any procedures.     Carlos Mitchell MD  Critical Care Medicine  [facility] - Cardiac Medical [facility]

## 2023-06-21 NOTE — ASSESSMENT & PLAN NOTE
-- nutrition consulted on admission for dietary assistance  -- cardiac diet while admitted with boost supplement

## 2023-06-21 NOTE — PLAN OF CARE
Recommendations     If/when able, ADAT to Renal/2000 kcal Diabetic.  If unable to advance diet or should pt be intubated & TFs warranted, please re-consult.  RD following.     Goals: Meet % EEN, EPN by RD f/u date  Nutrition Goal Status: new  Communication of RD Recs: other (comment) (POC)

## 2023-06-21 NOTE — PROCEDURES
"Cosme Lewis is a 57 y.o. male patient.    Temp: 97.9 °F (36.6 °C) (23 0309)  Pulse: (!) 58 (23 0500)  Resp: (!) 30 (23 0500)  BP: (!) 159/89 (23 0400)  SpO2: 100 % (23 0500)  Weight: 65.3 kg (143 lb 15.4 oz) (23 030)       Paracentesis    Date/Time: 2023 5:54 AM  Location procedure was performed: [facility] CRITICAL CARE MEDICINE  Performed by: Carlos Mitchell MD  Authorized by: Carlos Mitchell MD   Consent Done: Yes  Consent: Verbal consent obtained.  Consent given by: parent  Site marked: the operative site was marked  Imaging studies: imaging studies available  Patient identity confirmed: , MRN and name  Time out: Immediately prior to procedure a "time out" was called to verify the correct patient, procedure, equipment, support staff and site/side marked as required.  Initial or subsequent exam: initial  Procedure purpose: diagnostic  Indications: new onset ascites  Anesthesia: local infiltration    Anesthesia:  Local Anesthetic: lidocaine 1% without epinephrine  Fluid removed: 20(ml)  Fluid appearance: serous  Dressing: 4x4 sterile gauze  Complications: No  Estimated blood loss (mL): 5  Patient tolerance: Patient tolerated the procedure well with no immediate complications      Carlos Mitchell MD  [facility]  Critical Care Medicine    2023    "

## 2023-06-21 NOTE — PROGRESS NOTES
[facility] Failure [facility] (HFT[facility]) Team notified of pt referral via [facility] (automated inbasket notification) .    Patient screened today June 21, 2023 by provider and RN for enrollment to program.      Pt was deemed not a candidate for enrollment at this time related to patient is currently on hemo-dialysis.    Pt will require additional follow up planning per primary team.     If pt status, diagnosis, or treatment plan changes , please place AMB referral to [facility] ([id number]) for HFTCC enrollment re-evalution.

## 2023-06-21 NOTE — PLAN OF CARE
CMICU DAILY GOALS       A: Awake    RASS: Goal -    Actual -     Restraint necessity:    B: Breathe   SBT: Not intubated   C: Coordinate A & B, analgesics/sedatives   Pain: managed    SAT: Not intubated  D: Delirium   CAM-ICU:    E: Early(intubated/ Progressive (non-intubated) Mobility   MOVE Screen: Pass   Activity: Activity Management: Patient unable to perform activities  FAS: Feeding/Nutrition   Diet order:  ,    T: Thrombus   DVT prophylaxis:    H: HOB Elevation      U: Ulcer Prophylaxis   GI: no  G: Glucose control   managed    S: Skin                  B: Bowel Function   no issues   I: Indwelling Catheters   Dutta necessity:     CVC necessity: No  D: De-escalation Antibiotics   Yes    Family/Goals of care/Code Status   Code Status: Full Code    24H Vital Sign Range  Temp:  [96.6 °F (35.9 °C)-98.7 °F (37.1 °C)]   Pulse:  [55-59]   Resp:  [14-30]   BP: (137-181)/()   SpO2:  [90 %-100 %]      Shift Events   No acute events throughout shift    VS and assessment per flow sheet, patient progressing towards goals as tolerated, plan of care reviewed with  no family at the bedside overnight , all concerns addressed, will continue to monitor.    Opal Cifuentes

## 2023-06-21 NOTE — CARE UPDATE
Consult order acknowledged.    Immediate recommendations:   -emergent iHD for pulmonary edema  -hold BP medications prior to HD  Full consult note to follow  Please page [phone] or message me if you have any questions or concerns      Shagufta Campos M.D.   Nephrology  [facility]

## 2023-06-21 NOTE — CONSULTS
Pt seen and examined. Will be admitted to critical care medicine. Full H&P to follow.     [facility]-  Critical Care Medicine  06/21/2023 6:05 AM

## 2023-06-21 NOTE — CONSULTS
[facility] - Cardiac Medical ICU  Adult Nutrition  Consult Note    SUMMARY     Recommendations    If/when able, ADAT to Renal/2000 kcal Diabetic.  If unable to advance diet or should pt be intubated & TFs warranted, please re-consult.  RD following.    Goals: Meet % EEN, EPN by RD f/u date  Nutrition Goal Status: new  Communication of RD Recs: other (comment) (POC)    Assessment and Plan    Moderate malnutrition    Nutrition Problem:  Moderate Protein-Calorie Malnutrition  Malnutrition in the context of Chronic Illness/Injury    Related to (etiology):  Inability to consume sufficient energy     Signs and Symptoms (as evidenced by):  Body Fat Depletion: mild and moderate depletion of orbitals   Muscle Mass Depletion: mild and moderate depletion of temples, clavicle region and lower extremities   Weight Loss: 10% x 6 months    Interventions(treatment strategy):  Collaboration of nutrition care w/ other providers    Nutrition Diagnosis Status:  New    Malnutrition Assessment    Malnutrition Context: chronic illness  Malnutrition Level: moderate    Weight Loss (Malnutrition): 10% in 6 months  Subcutaneous Fat (Malnutrition): moderate depletion  Muscle Mass (Malnutrition): moderate depletion     Reason for Assessment    Reason For Assessment: consult  Diagnosis: other (see comments) (Resp. fx)  Relevant Medical History: HF, ESRD on HD, DM  Interdisciplinary Rounds: attended    General Information Comments: NPO, on continuous BiPAP this AM (possible intubation watch, per rounds). HD to be initiated. Unsure of energy intake PTA; UBW: 160# per chart review. Visual NFPE reveals mild-moderate muscle/fat wasting. RD feels pt meets criteria for moderate malnutrition. Please see PES statement for details. Noted A1C of 10.7 - education left at bedside & RD to follow-up if appropriate.   Nutrition Discharge Planning: Pending clinical course    Nutrition/Diet History    Factors Affecting Nutritional Intake:  "NPO    Anthropometrics    Temp: 97.9 °F (36.6 °C)  Height: 5' 9" (175.3 cm)  Height (inches): 69 in  Weight Method: Bed Scale  Weight: 65.3 kg (143 lb 15.4 oz)  Weight (lb): 143.96 lb  Ideal Body Weight (IBW), Male: 160 lb  % Ideal Body Weight, Male (lb): 89.98 %  BMI (Calculated): 21.2  BMI Grade: 18.5-24.9 - normal  Usual Body Weight (UBW), k.7 kg  % Usual Body Weight: 90.01  % Weight Change From Usual Weight: -10.18 %    Lab/Procedures/Meds    Pertinent Labs Reviewed: reviewed  Pertinent Labs Comments: Na 135, Creat 3.7, GFR 18, A1C 10.7  Pertinent Medications Reviewed: reviewed    Estimated/Assessed Needs    Weight Used For Calorie Calculations: 65.3 kg (143 lb 15.4 oz)    Energy Calorie Requirements (kcal): 1823 kcal/d  Energy Need Method: Bailey-St Jeor (1.25 PAL)    Protein Requirements: 79 g/d (1.2 g/kg)  Weight Used For Protein Calculations: 65.3 kg (143 lb 15.4 oz)    Estimated Fluid Requirement Method: other (see comments) (Per MD or 1 mL/kcal)  RDA Method (mL): 1823    CHO Requirement: 228g    Nutrition Prescription Ordered    Current Diet Order: NPO    Evaluation of Received Nutrient/Fluid Intake    I/O: +1L since admit    Comments: LBM: PTA    Nutrition Risk    Level of Risk/Frequency of Follow-up:  (1x/week)     Monitor and Evaluation    Food and Nutrient Intake: food and beverage intake, energy intake  Food and Nutrient Adminstration: diet order, enteral and parenteral nutrition administration  Physical Activity and Function: nutrition-related ADLs and IADLs  Anthropometric Measurements: weight, weight change  Biochemical Data, Medical Tests and Procedures: glucose/endocrine profile, inflammatory profile, lipid profile, gastrointestinal profile, electrolyte and renal panel  Nutrition-Focused Physical Findings: overall appearance     Nutrition Follow-Up    RD Follow-up?: Yes    "

## 2023-06-21 NOTE — ASSESSMENT & PLAN NOTE
56 y/o M with hx of HFpEF, COPD, multiple pulmonary emboli on eliquis presenting with acute on chronic hypoxic/hypercarbic respiratory failure. On exam patient with LE edema, bilat pulm edema on CXR, hypoxic requiring supplemental O2. ABG also with notable acidosis and hypercarbia. Current problem likely 2/2 acute decompensated heart failure worsened by significant volume overload in an anuric patient requiring HD. Following several hours of bipap, patient mentation significantly improved and patient conversing at baseline.     Patient with Hypercapnic and Hypoxic Respiratory failure which is Acute on chronic.  he is on home oxygen at 2 LPM. Supplemental oxygen was provided and noted- Oxygen Concentration (%):  [30] 30    Signs/symptoms of respiratory failure include- tachypnea, increased work of breathing, respiratory distress, use of accessory muscles and lethargy. Contributing diagnoses includes - CHF Labs and images were reviewed. Patient Has recent ABG, which has been reviewed. Will treat underlying causes and adjust management of respiratory failure as follows-     Plan:  -- Volume removal via HD per nephro  -- resume home antihypertensive medications for strict BP control  -- Bipap prn for severe acidosis and hypercarbia with q4h blood gas  -- rest of care as noted under respective problems

## 2023-06-21 NOTE — CONSULTS
[facility]  Nephrology  Consult Note    Patient Name: Cosme Lewis  MRN: [id number]  Admission Date: 6/20/2023  Hospital Length of Stay: 0 days  Attending Provider: Jhonny Lezama MD   Primary Care Physician: Primary Doctor No  Principal Problem:Acute on chronic respiratory failure with hypoxia and hypercapnia    Inpatient consult to Nephrology  Consult performed by: Abdulaziz Forbes NP  Consult ordered by: Silverio Banks MD  Reason for consult: ESRD/HD     Inpatient consult to Nephrology  Consult performed by: Abdulaziz Forbes NP  Consult ordered by: Vita Cano DNP  Reason for consult: ESRD/HD         Subjective:     HPI: Mr. Cosme Lewis is a 56 y/o M with hx of HFpEF (LVEF 55%, Grade II DD on echo 3/20/23), chronic RF on home O2, IDDM2, ESRD on HD (MWF), COPD, HTN, muliple PEs on eliquis who presented to the ED from [facility] for evaluation of AMS and hypoxia. He was in his usual state of health until yesterday afternoon when staff noticed he was more lethargic than normal, and was satting in the 80s.      In the ED, patient afebrile, hypertensive to 170s systolic, HR 50s, hypoxic requiring 10L NRB. CBC grossly unchanged from BL with chronic leukopenia and anemia. CMP also grossly unchanged from BL, with known mild hyponatremia. Lactic elevated to 3.0. BNP 3200. Trops wnl. Procal and CRP elevated. CXR demonstrating bilateral edema as well as L pleural effusion. CTH showing L mastoiditis, otherwise no acute intracranial abnormality. Initial VBG demonstrating pH 7.30/PCO2 68. Patient given 80 lasix IV, duo-nebs, ceftriaxone, and solumedrol. repeat VBG with pH down to 7.27, CO2 76. Patient placed on bipap and MICU consulted for management of severe respiratory acidosis s/o HFE and COPD. Nephrology was consulted for management of ESRD/HD.       Past Medical History:   Diagnosis Date    Chronic kidney disease-mineral and bone disorder 10/12/2022    COPD (chronic obstructive  pulmonary disease)     Diabetes mellitus type 1     ESRD on dialysis     Hypertension     Hypervolemia 2/22/2023    Hyponatremia 3/4/2023    SOB (shortness of breath) 10/12/2022    Patient with history COPD treated with Ellipta the albuterol, fluticasone-salmeterol tachypneic in the ED saturating 94% and 6 L on nasal cannula, patient does not know how many  he uses it is in nursing home.    -duo nebs Q 4  Given that patient is a poor historian, and in the setting of left lower extremity edema and history of coagulopathy PE cannot be ruled out.  Will workup for possible infec       Past Surgical History:   Procedure Laterality Date    AV FISTULA PLACEMENT         Review of patient's allergies indicates:  No Known Allergies  Current Facility-Administered Medications   Medication Frequency    albuterol-ipratropium 2.5 mg-0.5 mg/3 mL nebulizer solution 3 mL Q4H WAKE    apixaban tablet 5 mg BID    artificial tears 0.5 % ophthalmic solution 1 drop 6x Daily    aspirin EC tablet 81 mg Daily    atorvastatin tablet 40 mg Daily    carvediloL tablet 6.25 mg BID    [START ON 6/22/2023] cefTRIAXone (ROCEPHIN) 2 g in dextrose 5 % in water (D5W) 5 % 100 mL IVPB (MB+) Q24H    cetirizine tablet 10 mg Daily PRN    erythromycin 5 mg/gram (0.5 %) ophthalmic ointment Q8H    FLUoxetine capsule 40 mg Daily    furosemide injection 80 mg ED 1 Time    heparin (porcine) injection 1,000 Units PRN    insulin aspart U-100 pen 6 Units TIDWM    insulin detemir U-100 (Levemir) pen 3 Units QHS    insulin detemir U-100 (Levemir) pen 6 Units Daily    isosorbide mononitrate 24 hr tablet 30 mg Daily    lisinopriL tablet 40 mg QHS    melatonin tablet 3 mg Nightly PRN    metroNIDAZOLE tablet 500 mg Q8H    mupirocin 2 % ointment BID    NIFEdipine 24 hr tablet 60 mg BID    sevelamer carbonate tablet 800 mg TID    sodium chloride 0.9% bolus 250 mL 250 mL PRN    sodium chloride 0.9% flush 10 mL PRN    sodium chloride 0.9% flush  10 mL PRN     Family History       Problem Relation (Age of Onset)    Diabetes Mother          Tobacco Use    Smoking status: Never    Smokeless tobacco: Never   Substance and Sexual Activity    Alcohol use: Not Currently    Drug use: Not on file    Sexual activity: Not Currently     Review of Systems   Unable to perform ROS: Acuity of condition (On BiPAP)   Objective:     Vital Signs (Most Recent):  Temp: 98.2 °F (36.8 °C) (06/21/23 1100)  Pulse: (!) 57 (06/21/23 1300)  Resp: (!) 25 (06/21/23 1300)  BP: (!) 158/89 (06/21/23 1300)  SpO2: (!) 94 % (06/21/23 1300) Vital Signs (24h Range):  Temp:  [96.6 °F (35.9 °C)-98.7 °F (37.1 °C)] 98.2 °F (36.8 °C)  Pulse:  [55-59] 57  Resp:  [14-30] 25  SpO2:  [90 %-100 %] 94 %  BP: (134-181)/() 158/89     Weight: 65.3 kg (143 lb 15.4 oz) (06/21/23 1330)  Body mass index is 21.26 kg/m².  Body surface area is 1.78 meters squared.    I/O last 3 completed shifts:  In: 100 [IV Piggyback:100]  Out: -      Physical Exam  Constitutional:       Appearance: He is ill-appearing.   HENT:      Head: Normocephalic and atraumatic.      Nose: Nose normal.      Mouth/Throat:      Mouth: Mucous membranes are moist.      Pharynx: Oropharynx is clear.   Eyes:      Conjunctiva/sclera: Conjunctivae normal.   Cardiovascular:      Rate and Rhythm: Regular rhythm. Bradycardia present.      Comments: LUE AVF with +thrill and +bruit   Pulmonary:      Breath sounds: Rales present.      Comments: BiPAP  Abdominal:      General: Abdomen is flat.   Musculoskeletal:      Cervical back: Neck supple.      Right lower leg: No edema.      Left lower leg: No edema.   Skin:     General: Skin is warm and dry.   Neurological:      General: No focal deficit present.      Mental Status: He is alert.   Psychiatric:         Mood and Affect: Mood normal.         Behavior: Behavior normal.        Significant Labs:  CBC:   Recent Labs   Lab 06/21/23  0428   WBC 3.36*   RBC 4.25*   HGB 12.8*   HCT 40.6       MCV 96   MCH 30.1   MCHC 31.5*     CMP:   Recent Labs   Lab 06/21/23  0428   *   CALCIUM 8.2*   ALBUMIN 2.9*   PROT 6.3   *   K 3.9   CO2 30*   CL 92*   BUN 28*   CREATININE 3.7*   ALKPHOS 195*   ALT 32   AST 27   BILITOT 0.9     All labs within the past 24 hours have been reviewed.      Assessment/Plan:     Renal/  Chronic kidney disease-mineral and bone disorder  -Renal diet   -Continue renvela     ESRD (end stage renal disease)  Outpatient HD Information:    -Outpatient HD unit: [facility]   -HD tx days: MWF   -Last HD tx prior to presentation: 6/16/23  -HD access: LUE AVF   -HD modality: iHD   -Residual urine: Anuric       Plan/Recommendations:    -S/P HD this am with a net UF of 3L and subsequent improvement in oxygenation. Plan to continue HD MWF.   -Renal diet  -Strict I/O's and daily weights  -Daily renal function panels   -Renally dose meds      Oncology  Anemia in ESRD (end-stage renal disease)  -Target Hg of 10-12  -Transfuse for Hg <7.0        Thank you for your consult. I will follow-up with patient. Please contact us if you have any additional questions.    Abdulaziz Forbes, NP  Nephrology  [facility]

## 2023-06-21 NOTE — HPI
Mr. Cosme Lewis is a 56 y/o M with hx of HFpEF (LVEF 55%, Grade II DD on echo 3/20/23), chronic RF on home O2, IDDM2, ESRD on HD (MWF), COPD, HTN, muliple PEs on eliquis who presented to the ED from [facility] for evaluation of AMS and hypoxia. He was in his usual state of health until yesterday afternoon when staff noticed he was more lethargic than normal, and was satting in the 80s.      In the ED, patient afebrile, hypertensive to 170s systolic, HR 50s, hypoxic requiring 10L NRB. CBC grossly unchanged from BL with chronic leukopenia and anemia. CMP also grossly unchanged from BL, with known mild hyponatremia. Lactic elevated to 3.0. BNP 3200. Trops wnl. Procal and CRP elevated. CXR demonstrating bilateral edema as well as L pleural effusion. CTH showing L mastoiditis, otherwise no acute intracranial abnormality. Initial VBG demonstrating pH 7.30/PCO2 68. Patient given 80 lasix IV, duo-nebs, ceftriaxone, and solumedrol. repeat VBG with pH down to 7.27, CO2 76. Patient placed on bipap and MICU consulted for management of severe respiratory acidosis s/o HFE and COPD. Nephrology was consulted for management of ESRD/HD.

## 2023-06-21 NOTE — ASSESSMENT & PLAN NOTE
-- on HD as outpatient  -- nephrology consulted on admission for urgent HD given volume overload contributing to respiratory failure  -- continue home sevelamer 800mg TID

## 2023-06-22 NOTE — HOSPITAL COURSE
Admitted to Critical Care Medicine for acute respiratory failure requiring continuous BiPap use. Nephrology consult initiated dialysis for volume removal. BiPap weaned to nasal cannula. Started on Ceftriaxone/Metronidazole to address mastoiditis/pneumonia concerns and transitioned to Augmentin(ESRD dosage) to complete 7 day course. Underwent paracentesis for ascites. SAAG <1.1, unclear etiology. Cytology pending and malignancy markers ordered for further evaluation.  Resumed all home medications and administered IV hydralazine x1 for elevated BP, Insulin infusion started for hyperglycemia with plans to transition to scheduled dosing when appropriate.Patient stepped down last night    Please follow up results of tumor markers and abnormal cells seen on cytology

## 2023-06-22 NOTE — ASSESSMENT & PLAN NOTE
Carvedilol 6.25 mg BID  IMDUR 30 mg QD  Lisinopril 40 mg QHS  Nifedipine 60 mg BID    Uncontrolled  Would recommend adding catapres patch, could be having rebound hypertension as he was on this medication as OP.

## 2023-06-22 NOTE — ASSESSMENT & PLAN NOTE
-- Hgb on admission around baseline at 13.4  Recent Labs     06/20/23  2232 06/21/23  0428 06/22/23  0317   HGB 13.4* 12.8* 11.2*         -- daily CBC  -- transfuse for Hgb < 7

## 2023-06-22 NOTE — ASSESSMENT & PLAN NOTE
-- on admission patient with notable L facial swelling. CTH showing concerns for L mastoiditis  -- Discussed with ENT on admission, low suspicion for mastoiditis.   -- Treated empirically with Ceftriaxone+ Flagyl and transitioned to PO augmentin to complete 7 day course

## 2023-06-22 NOTE — PROGRESS NOTES
[facility]  Critical Care Medicine  Progress Note    Patient Name: Cosme Lewis  MRN: [id number]  Admission Date: 6/20/2023  Hospital Length of Stay: 1 days  Code Status: Full Code  Attending Provider: Jhonny Lezama MD  Primary Care Provider: Primary Doctor No   Principal Problem: Acute on chronic respiratory failure with hypoxia and hypercapnia    Subjective:     HPI:  Mr. Cosme Lewis is a 56 y/o M with hx of HFpEF (LVEF 55%, Grade II DD on echo 3/20/23), chronic RF on home O2, IDDM2, ESRD on HD, COPD, HTN, muliple PEs on eliquis who presented to the ED from [facility] for evaluation of AMS and hypoxia. He was in his usual state of health until yesterday afternoon when staff noticed he was more lethargic than normal, and was satting in the 80s.     In the ED, patient afebrile, hypertensive to 170s systolic, HR 50s, hypoxic requiring 10L NRB. CBC grossly unchanged from BL with chronic leukopenia and anemia. CMP also grossly unchanged from BL, with known mild hyponatremia. Lactic elevated to 3.0. BNP 3200. Trops wnl. Procal and CRP elevated. CXR demonstrating bilateral edema as well as L pleural effusion. CTH showing L mastoiditis, otherwise no acute intracranial abnormality. Initial VBG demonstrating pH 7.30/PCO2 68. Patient given 80 lasix IV, duo-nebs, ceftriaxone, and solumedrol. repeat VBG with pH down to 7.27, CO2 76. Patient placed on bipap and MICU consulted for management of severe respiratory acidosis s/o HFE and COPD.          Hospital/ICU Course:  Admitted to Critical Care Medicine for acute respiratory failure requiring continuous BiPap use. Nephrology consult initiated dialysis for volume removal. BiPap weaned to nasal cannula. Started on Ceftriaxone/Metronidazole to address mastoiditis/pneumonia concerns and transitioned to Augmentin(ESRD dosage) to complete 7 day course. Underwent paracentesis for ascites. SAAG <1.1, unclear etiology. Cytology pending and malignancy markers ordered  for further evaluation.  Resumed all home medications and administered IV hydralazine x1 for elevated BP, Insulin infusion started for hyperglycemia with plans to transition to scheduled dosing when appropriate.      Interval History/Significant Events: No acute events overnight, afebrile. Improved supplemental oxygen requirements this morning and hydralazine 5mg IV x1 administered for elevated BP. Started on insulin infusion for hyperglycemia.        Review of Systems   Constitutional:  Positive for fatigue. Negative for chills and fever.   HENT:  Negative for trouble swallowing.    Respiratory:  Positive for cough and shortness of breath.    Cardiovascular:  Negative for chest pain and leg swelling.   Gastrointestinal:  Negative for constipation, diarrhea, nausea and vomiting.   Objective:     Vital Signs (Most Recent):  Temp: 97.7 °F (36.5 °C) (06/22/23 0800)  Pulse: 68 (06/22/23 0900)  Resp: 15 (06/22/23 0900)  BP: (!) 203/95 (MD at bedside, no new orders at this time.) (06/22/23 0900)  SpO2: 97 % (06/22/23 0900) Vital Signs (24h Range):  Temp:  [97.4 °F (36.3 °C)-97.9 °F (36.6 °C)] 97.7 °F (36.5 °C)  Pulse:  [57-68] 68  Resp:  [14-28] 15  SpO2:  [90 %-100 %] 97 %  BP: (122-204)/(73-95) 203/95   Weight: 65.3 kg (143 lb 15.4 oz)  Body mass index is 21.26 kg/m².      Intake/Output Summary (Last 24 hours) at 6/22/2023 1114  Last data filed at 6/22/2023 0955  Gross per 24 hour   Intake 816.64 ml   Output 10 ml   Net 806.64 ml          Physical Exam  Vitals and nursing note reviewed.   Constitutional:       General: He is awake. He is not in acute distress.     Appearance: He is not toxic-appearing or diaphoretic.      Interventions: Nasal cannula in place.   HENT:      Head: Normocephalic and atraumatic.      Mouth/Throat:      Mouth: Mucous membranes are moist.   Eyes:      General: No scleral icterus.        Right eye: No discharge.         Left eye: No discharge.   Cardiovascular:      Rate and Rhythm: Normal  rate and regular rhythm.      Heart sounds: Normal heart sounds.   Pulmonary:      Effort: No respiratory distress.      Breath sounds: No wheezing.   Abdominal:      General: Bowel sounds are normal. There is distension.      Palpations: Abdomen is soft.      Tenderness: There is no abdominal tenderness. There is no guarding.   Musculoskeletal:      Right lower leg: Edema present.      Left lower leg: Edema present.   Skin:     General: Skin is warm and dry.   Neurological:      Mental Status: He is alert, oriented to person, place, and time and easily aroused. Mental status is at baseline.   Psychiatric:         Mood and Affect: Mood normal.         Behavior: Behavior normal. Behavior is cooperative.          Vents:  Oxygen Concentration (%): 24 (06/22/23 0409)  Lines/Drains/Airways       Drain  Duration                  Hemodialysis AV Fistula Left upper arm -- days    Male External Urinary Catheter 06/21/23 0103 Medium 1 day              Peripheral Intravenous Line  Duration                  Peripheral IV - Single Lumen 06/21/23 1151 22 G Anterior;Right Forearm <1 day                  Significant Labs:    CBC/Anemia Profile:  Recent Labs   Lab 06/20/23 2232 06/21/23 0428 06/22/23  0317   WBC 4.72 3.36* 4.03   HGB 13.4* 12.8* 11.2*   HCT 42.4 40.6 35.3*    175 132*   MCV 94 96 95   RDW 14.4 14.5 14.7*        Chemistries:  Recent Labs   Lab 06/20/23 2232 06/21/23 0428 06/22/23 0317   * 135* 135*   K 4.2 3.9 4.6   CL 95 92* 96   CO2 21* 30* 27   BUN 25* 28* 28*   CREATININE 3.5* 3.7* 3.3*   CALCIUM 8.1* 8.2* 8.3*   ALBUMIN 2.8* 2.9* 2.9*   PROT 6.4 6.3 6.1   BILITOT 0.7 0.9 0.5   ALKPHOS 185* 195* 173*   ALT 36 32 29   AST 33 27 23   MG 2.2 2.1 2.2   PHOS  --  4.4 4.8*       All pertinent labs within the past 24 hours have been reviewed.    Significant Imaging:  I have reviewed all pertinent imaging results/findings within the past 24 hours.      ABG  Recent Labs   Lab 06/21/23  1128   PH 7.234*    PO2 20*   PCO2 73.0*   HCO3 30.9*   BE 3     Assessment/Plan:     ENT  Mastoiditis of left side  -- on admission patient with notable L facial swelling. CTH showing concerns for L mastoiditis  -- Discussed with ENT on admission, low suspicion for mastoiditis.   -- Treated empirically with Ceftriaxone+ Flagyl and transitioned to PO augmentin to complete 7 day course    Pulmonary  * Acute on chronic respiratory failure with hypoxia and hypercapnia  58 y/o M with hx of HFpEF, COPD, multiple pulmonary emboli on eliquis presenting with acute on chronic hypoxic/hypercarbic respiratory failure. On exam patient with LE edema, bilat pulm edema on CXR, hypoxic requiring supplemental O2. ABG also with notable acidosis and hypercarbia. Current problem likely 2/2 acute decompensated heart failure worsened by significant volume overload in an anuric patient requiring HD. Following several hours of bipap, patient mentation significantly improved and patient conversing at baseline.     Patient with Hypercapnic and Hypoxic Respiratory failure which is Acute on chronic.  he is on home oxygen at 2 LPM. Supplemental oxygen was provided and noted- Oxygen Concentration (%):  [24-28] 24    Signs/symptoms of respiratory failure include- tachypnea, increased work of breathing, respiratory distress, use of accessory muscles and lethargy. Contributing diagnoses includes - CHF Labs and images were reviewed. Patient Has recent ABG, which has been reviewed. Will treat underlying causes and adjust management of respiratory failure as follows-         Intake/Output Summary (Last 24 hours) at 6/22/2023 0908  Last data filed at 6/22/2023 0501  Gross per 24 hour   Intake 1216.64 ml   Output 3510 ml   Net -2293.36 ml     Net IO Since Admission: -2,193.36 mL [06/22/23 0908]    Plan:  -- Volume removal via HD per nephro  -- resume home antihypertensive medications for strict BP control  -- Bipap prn for severe acidosis and hypercarbia with q4h blood  gas  -- rest of care as noted under respective problems    COPD (chronic obstructive pulmonary disease)  -- hx of COPD on home Spiriva and rescue albuterol inhaler  -- s/p duo-nebs tx as well as dose of solumedrol in the ED  -- no evidence to suggest acute COPD exacerbation on admission  -- prn duo-nebs while admitted for wheezing  -- goal O2 sat 88-92%    Chronic hypoxemic respiratory failure  -- see acute on chronic respiratory failure    Cardiac/Vascular  Acute on chronic diastolic heart failure  -- patient with hx of heart failure with concurrent ESRD requiring HD. Patient sxs and clinical exam c/w acute decompensated HF.  -- on heart failure pathway  -- SAAG <1.1. Portal HTN unlikely  -- volume removal via HD  -- resume home Imdur, coreg, lisinopril  -- cardiac diet    Echo  Result Date: 3/20/2023  · The left ventricle is normal in size with normal systolic function.  · The estimated ejection fraction is 55%.  · Grade II left ventricular diastolic dysfunction.  · Moderate right ventricular enlargement with moderately reduced right   ventricular systolic function.  · Biatrial enlargement.  · Interatrial septum bows to left, consider elevated right atrial   pressure.  · Mild tricuspid regurgitation.  · The estimated PA systolic pressure is 54 mmHg.  · There is pulmonary hypertension.  · Elevated central venous pressure (15 mmHg).  · Small circumferential pericardial effusion.  · There are bilateral pleural effusions.  · There is ascites present.             Hypertension  -- on multiple medications currently including coreg, nifedipine, lisinopril, imdur  -- hypertensive on admit to 170s systolic, which could be contributing factor to pulmonary edema and respiratory failure  -- will resume all home antihypertensives for strict BP control     HLD (hyperlipidemia)  -- most recent LDL 40 on 10/12/2023  -- continue home atorvastatin 40mg qd while admitted  -- repeat lipid panel ordered on admission    Lab Results  "  Component Value Date    HDL 34 (L) 06/21/2023    LDLCALC 30.0 (L) 06/21/2023    TRIG 30 06/21/2023    CHOL 70 (L) 06/21/2023        Renal/  Chronic kidney disease-mineral and bone disorder  See "ESRD (end stage renal disease)" A&P    ESRD (end stage renal disease)  -- on HD as outpatient  -- nephrology consulted on admission for urgent HD given volume overload contributing to respiratory failure  -- continue home sevelamer 800mg TID    Hematology  Multiple subsegmental pulmonary emboli without acute cor pulmonale  -- continue home eliquis on admission    Oncology  Anemia in ESRD (end-stage renal disease)  -- Hgb on admission around baseline at 13.4  Recent Labs     06/20/23  2232 06/21/23  0428 06/22/23  0317   HGB 13.4* 12.8* 11.2*         -- daily CBC  -- transfuse for Hgb < 7    Endocrine  Type 2 diabetes mellitus with hyperglycemia, with long-term current use of insulin    Home Diabetes Medications listed on Chart                  insulin detemir U-100, Levemir, 100 unit/mL (3 mL) SubQ InPn pen Inject 8 Units into the skin once daily.    insulin detemir U-100, Levemir, 100 unit/mL (3 mL) SubQ InPn pen Inject 4 Units into the skin every evening.    insulin lispro (HUMALOG KWIKPEN INSULIN) 100 unit/mL pen Inject 8 Units into the skin 3 (three) times daily before meals.        Last A1c:   Lab Results   Component Value Date    HGBA1C 10.7 (H) 06/21/2023      Recent Labs     06/21/23  0810 06/21/23  1146 06/21/23  1627 06/21/23  2121 06/22/23  0747 06/22/23  1056   POCTGLUCOSE 158* 138* 131* 235* 487* >500*         Plan:  Antihyperglycemics (From admission, onward)    Start     Stop Route Frequency Ordered    06/22/23 1230  insulin regular in 0.9 % NaCl 100 unit/100 mL (1 unit/mL) infusion            -- IV Continuous 06/22/23 1130      - Started on insulin drip for hyperglycemia. Transition to scheduled dosing when stable.   - Diabetic diet  - POCT glucose ACHS  - Will monitor glucose results and adjust insulin " regimen accordingly    Malnutrition of mild degree  -- nutrition consulted on admission for dietary assistance  -- cardiac diet while admitted with boost supplement     Critical Care Daily Checklist:    A: Awake: RASS Goal/Actual Goal: RASS Goal: 0-->alert and calm  Actual:     B: Spontaneous Breathing Trial Performed?     C: SAT & SBT Coordinated?  N/A                      D: Delirium: CAM-ICU Overall CAM-ICU: Negative   E: Early Mobility Performed? Yes   F: Feeding Goal: Goals: Meet % EEN, EPN by RD f/u date  Status: Nutrition Goal Status: new   Current Diet Order   Procedures    Diet diabetic [facility]; 2000 Calorie     Order Specific Question:   Indicate patient location for additional diet options:     Answer:   [facility]     Order Specific Question:   Total calories:     Answer:   2000 Calorie      AS: Analgesia/Sedation N/A   T: Thromboembolic Prophylaxis Apixaban   H: HOB > 300 Yes   U: Stress Ulcer Prophylaxis (if needed) N/A   G: Glucose Control Insulin infusion   B: Bowel Function Stool Occurrence: 1   I: Indwelling Catheter ([employer] Dutta) Necessity PIV   D: De-escalation of Antimicrobials/Pharmacotherapies D/C ceftriaxone/metronidazole. Start augmentin    Plan for the day/ETD Stepdown after BP and hyperglycemia stable    Code Status:  Family/Goals of Care: Full Code         Critical secondary to Patient has a condition that poses threat to life and bodily function: Severe Respiratory Distress(Resolved)      Critical care was time spent personally by me on the following activities: development of treatment plan with patient or surrogate and bedside caregivers, discussions with consultants, evaluation of patient's response to treatment, examination of patient, ordering and performing treatments and interventions, ordering and review of laboratory studies, ordering and review of radiographic studies, pulse oximetry, re-evaluation of patient's condition. This critical care time did not  overlap with that of any other provider or involve time for any procedures.     Gary Howe,   Critical Care Medicine  [facility]

## 2023-06-22 NOTE — ASSESSMENT & PLAN NOTE
Home Diabetes Medications listed on Chart                  insulin detemir U-100, Levemir, 100 unit/mL (3 mL) SubQ InPn pen Inject 8 Units into the skin once daily.    insulin detemir U-100, Levemir, 100 unit/mL (3 mL) SubQ InPn pen Inject 4 Units into the skin every evening.    insulin lispro (HUMALOG KWIKPEN INSULIN) 100 unit/mL pen Inject 8 Units into the skin 3 (three) times daily before meals.        Last A1c:   Lab Results   Component Value Date    HGBA1C 10.7 (H) 06/21/2023      Recent Labs     06/21/23  0810 06/21/23  1146 06/21/23  1627 06/21/23  2121 06/22/23  0747 06/22/23  1056   POCTGLUCOSE 158* 138* 131* 235* 487* >500*         Plan:  Antihyperglycemics (From admission, onward)    Start     Stop Route Frequency Ordered    06/22/23 1230  insulin regular in 0.9 % NaCl 100 unit/100 mL (1 unit/mL) infusion            -- IV Continuous 06/22/23 1130      - Started on insulin drip for hyperglycemia. Transition to scheduled dosing when stable.   - Diabetic diet  - POCT glucose ACHS  - Will monitor glucose results and adjust insulin regimen accordingly

## 2023-06-22 NOTE — ASSESSMENT & PLAN NOTE
-- patient with hx of heart failure with concurrent ESRD requiring HD. Patient sxs and clinical exam c/w acute decompensated HF.  -- on heart failure pathway  -- SAAG <1.1. Portal HTN unlikely  -- volume removal via HD  -- resume home Imdur, coreg, lisinopril  -- cardiac diet    Echo  Result Date: 3/20/2023  · The left ventricle is normal in size with normal systolic function.  · The estimated ejection fraction is 55%.  · Grade II left ventricular diastolic dysfunction.  · Moderate right ventricular enlargement with moderately reduced right   ventricular systolic function.  · Biatrial enlargement.  · Interatrial septum bows to left, consider elevated right atrial   pressure.  · Mild tricuspid regurgitation.  · The estimated PA systolic pressure is 54 mmHg.  · There is pulmonary hypertension.  · Elevated central venous pressure (15 mmHg).  · Small circumferential pericardial effusion.  · There are bilateral pleural effusions.  · There is ascites present.

## 2023-06-22 NOTE — ASSESSMENT & PLAN NOTE
Outpatient HD Information:    -Outpatient HD unit: [facility]   -HD tx days: MWF   -Last HD tx prior to presentation: 6/16/23  -HD access: LUE AVF   -HD modality: iHD   -Residual urine: Anuric       Plan/Recommendations:  -No emergent need for RRT today, will plan for HD tomorrow per his OP schedule  -Renal diet, 1L fluid restrictions  -Strict I/O's and daily weights  -Daily renal function panels   -Renally dose meds

## 2023-06-22 NOTE — PROGRESS NOTES
[facility] - [facility]  Nephrology  Progress Note    Patient Name: Cosme Lewis  MRN: [id number]  Admission Date: 6/20/2023  Hospital Length of Stay: 1 days  Attending Provider: Jhonny Lezama MD   Primary Care Physician: Primary Doctor No  Principal Problem:Acute on chronic respiratory failure with hypoxia and hypercapnia    Subjective:     HPI: Mr. Cosme Lewis is a 58 y/o M with hx of HFpEF (LVEF 55%, Grade II DD on echo 3/20/23), chronic RF on home O2, IDDM2, ESRD on HD (MWF), COPD, HTN, muliple PEs on eliquis who presented to the ED from [facility] for evaluation of AMS and hypoxia. He was in his usual state of health until yesterday afternoon when staff noticed he was more lethargic than normal, and was satting in the 80s.      In the ED, patient afebrile, hypertensive to 170s systolic, HR 50s, hypoxic requiring 10L NRB. CBC grossly unchanged from BL with chronic leukopenia and anemia. CMP also grossly unchanged from BL, with known mild hyponatremia. Lactic elevated to 3.0. BNP 3200. Trops wnl. Procal and CRP elevated. CXR demonstrating bilateral edema as well as L pleural effusion. CTH showing L mastoiditis, otherwise no acute intracranial abnormality. Initial VBG demonstrating pH 7.30/PCO2 68. Patient given 80 lasix IV, duo-nebs, ceftriaxone, and solumedrol. repeat VBG with pH down to 7.27, CO2 76. Patient placed on bipap and MICU consulted for management of severe respiratory acidosis s/o HFE and COPD. Nephrology was consulted for management of ESRD/HD.       Interval History:   NAEON.  Oxygenating well on 1L NC this morning, c/o feeling hungry.  Electrolytes non-emergent, hypertensive on exam.  Last HD was yesterday.    Review of patient's allergies indicates:  No Known Allergies  Current Facility-Administered Medications   Medication Frequency    albuterol-ipratropium 2.5 mg-0.5 mg/3 mL nebulizer solution 3 mL Q4H WAKE    apixaban tablet 5 mg BID    artificial tears 0.5 % ophthalmic solution 1  drop 6x Daily    aspirin EC tablet 81 mg Daily    atorvastatin tablet 40 mg Daily    carvediloL tablet 6.25 mg BID    cefTRIAXone (ROCEPHIN) 2 g in dextrose 5 % in water (D5W) 5 % 100 mL IVPB (MB+) Q24H    cetirizine tablet 10 mg Daily PRN    dextrose 10% bolus 125 mL 125 mL PRN    dextrose 10% bolus 250 mL 250 mL PRN    dextrose 40 % gel 15,000 mg PRN    dextrose 40 % gel 30,000 mg PRN    erythromycin 5 mg/gram (0.5 %) ophthalmic ointment Q8H    FLUoxetine capsule 40 mg Daily    glucagon (human recombinant) injection 1 mg PRN    heparin (porcine) injection 1,000 Units PRN    insulin aspart U-100 pen 1-10 Units QID (AC + HS) PRN    insulin aspart U-100 pen 6 Units TIDWM    insulin detemir U-100 (Levemir) pen 3 Units QHS    [START ON 6/23/2023] insulin detemir U-100 (Levemir) pen 8 Units Daily    isosorbide mononitrate 24 hr tablet 30 mg Daily    lisinopriL tablet 40 mg QHS    melatonin tablet 3 mg Nightly PRN    metroNIDAZOLE tablet 500 mg Q8H    mupirocin 2 % ointment BID    NIFEdipine 24 hr tablet 60 mg BID    sevelamer carbonate tablet 800 mg TID    sodium chloride 0.9% bolus 250 mL 250 mL PRN    sodium chloride 0.9% flush 10 mL PRN    sodium chloride 0.9% flush 10 mL PRN    tiotropium bromide 2.5 mcg/actuation inhaler 2 puff Daily       Objective:     Vital Signs (Most Recent):  Temp: 97.7 °F (36.5 °C) (06/22/23 0800)  Pulse: 68 (06/22/23 0900)  Resp: 15 (06/22/23 0900)  BP: (!) 203/95 (MD at bedside, no new orders at this time.) (06/22/23 0900)  SpO2: 97 % (06/22/23 0900) Vital Signs (24h Range):  Temp:  [97.4 °F (36.3 °C)-97.9 °F (36.6 °C)] 97.7 °F (36.5 °C)  Pulse:  [57-68] 68  Resp:  [14-28] 15  SpO2:  [90 %-100 %] 97 %  BP: (122-204)/(73-95) 203/95     Weight: 65.3 kg (143 lb 15.4 oz) (06/21/23 1330)  Body mass index is 21.26 kg/m².  Body surface area is 1.78 meters squared.    I/O last 3 completed shifts:  In: 1316.6 [P.O.:320; I.V.:300; Other:500; IV Piggyback:196.6]  Out: 3510  [Urine:10; Other:3500]     Physical Exam  Constitutional:       Appearance: Normal appearance. He is not ill-appearing.      Interventions: Nasal cannula in place.   HENT:      Head: Normocephalic and atraumatic.      Nose: Nose normal.      Mouth/Throat:      Mouth: Mucous membranes are moist.      Pharynx: Oropharynx is clear.   Eyes:      Conjunctiva/sclera: Conjunctivae normal.   Neck:      Vascular: JVD present.   Cardiovascular:      Rate and Rhythm: Regular rhythm. Bradycardia present.      Comments: LUE AVF with +thrill and +bruit   Pulmonary:      Breath sounds: Rales present.      Comments: NC  Abdominal:      General: Abdomen is flat.   Musculoskeletal:      Cervical back: Neck supple.      Right lower leg: No edema.      Left lower leg: No edema.   Skin:     General: Skin is warm and dry.   Neurological:      General: No focal deficit present.      Mental Status: He is alert and oriented to person, place, and time.   Psychiatric:         Mood and Affect: Mood normal.         Behavior: Behavior normal. Behavior is cooperative.        Significant Labs:  CBC:   Recent Labs   Lab 06/22/23 0317   WBC 4.03   RBC 3.72*   HGB 11.2*   HCT 35.3*   *   MCV 95   MCH 30.1   MCHC 31.7*     CMP:   Recent Labs   Lab 06/22/23 0317   *   CALCIUM 8.3*   ALBUMIN 2.9*   PROT 6.1   *   K 4.6   CO2 27   CL 96   BUN 28*   CREATININE 3.3*   ALKPHOS 173*   ALT 29   AST 23   BILITOT 0.5          Assessment/Plan:     Cardiac/Vascular  Hypertension  Carvedilol 6.25 mg BID  IMDUR 30 mg QD  Lisinopril 40 mg QHS  Nifedipine 60 mg BID    Uncontrolled  Would recommend adding catapres patch, could be having rebound hypertension as he was on this medication as OP.    Renal/  Chronic kidney disease-mineral and bone disorder  -Renal diet   -Continue renvela     ESRD (end stage renal disease)  Outpatient HD Information:    -Outpatient HD unit: [facility]   -HD tx days: MWF   -Last HD tx prior to presentation: 6/16/23  -HD  access: POP AVF   -HD modality: iHD   -Residual urine: Anuric       Plan/Recommendations:  -No emergent need for RRT today, will plan for HD tomorrow per his OP schedule  -Renal diet, 1L fluid restrictions  -Strict I/O's and daily weights  -Daily renal function panels   -Renally dose meds      Oncology  Anemia in ESRD (end-stage renal disease)  -Target Hg of 10-12  -no need for MARCIA therapy      Juventino Nguyen NP  Nephrology  [facility] - [facility]

## 2023-06-22 NOTE — ASSESSMENT & PLAN NOTE
56 y/o M with hx of HFpEF, COPD, multiple pulmonary emboli on eliquis presenting with acute on chronic hypoxic/hypercarbic respiratory failure. On exam patient with LE edema, bilat pulm edema on CXR, hypoxic requiring supplemental O2. ABG also with notable acidosis and hypercarbia. Current problem likely 2/2 acute decompensated heart failure worsened by significant volume overload in an anuric patient requiring HD. Following several hours of bipap, patient mentation significantly improved and patient conversing at baseline.     Patient with Hypercapnic and Hypoxic Respiratory failure which is Acute on chronic.  he is on home oxygen at 2 LPM. Supplemental oxygen was provided and noted- Oxygen Concentration (%):  [24-28] 24    Signs/symptoms of respiratory failure include- tachypnea, increased work of breathing, respiratory distress, use of accessory muscles and lethargy. Contributing diagnoses includes - CHF Labs and images were reviewed. Patient Has recent ABG, which has been reviewed. Will treat underlying causes and adjust management of respiratory failure as follows-         Intake/Output Summary (Last 24 hours) at 6/22/2023 0908  Last data filed at 6/22/2023 0501  Gross per 24 hour   Intake 1216.64 ml   Output 3510 ml   Net -2293.36 ml     Net IO Since Admission: -2,193.36 mL [06/22/23 0908]    Plan:  -- Volume removal via HD per nephro  -- resume home antihypertensive medications for strict BP control  -- Bipap prn for severe acidosis and hypercarbia with q4h blood gas  -- rest of care as noted under respective problems

## 2023-06-22 NOTE — ASSESSMENT & PLAN NOTE
-- most recent LDL 40 on 10/12/2023  -- continue home atorvastatin 40mg qd while admitted  -- repeat lipid panel ordered on admission    Lab Results   Component Value Date    HDL 34 (L) 06/21/2023    LDLCALC 30.0 (L) 06/21/2023    TRIG 30 06/21/2023    CHOL 70 (L) 06/21/2023

## 2023-06-22 NOTE — SUBJECTIVE & OBJECTIVE
Interval History:   NAEON.  Oxygenating well on 1L NC this morning, c/o feeling hungry.  Electrolytes non-emergent, hypertensive on exam.  Last HD was yesterday.    Review of patient's allergies indicates:  No Known Allergies  Current Facility-Administered Medications   Medication Frequency    albuterol-ipratropium 2.5 mg-0.5 mg/3 mL nebulizer solution 3 mL Q4H WAKE    apixaban tablet 5 mg BID    artificial tears 0.5 % ophthalmic solution 1 drop 6x Daily    aspirin EC tablet 81 mg Daily    atorvastatin tablet 40 mg Daily    carvediloL tablet 6.25 mg BID    cefTRIAXone (ROCEPHIN) 2 g in dextrose 5 % in water (D5W) 5 % 100 mL IVPB (MB+) Q24H    cetirizine tablet 10 mg Daily PRN    dextrose 10% bolus 125 mL 125 mL PRN    dextrose 10% bolus 250 mL 250 mL PRN    dextrose 40 % gel 15,000 mg PRN    dextrose 40 % gel 30,000 mg PRN    erythromycin 5 mg/gram (0.5 %) ophthalmic ointment Q8H    FLUoxetine capsule 40 mg Daily    glucagon (human recombinant) injection 1 mg PRN    heparin (porcine) injection 1,000 Units PRN    insulin aspart U-100 pen 1-10 Units QID (AC + HS) PRN    insulin aspart U-100 pen 6 Units TIDWM    insulin detemir U-100 (Levemir) pen 3 Units QHS    [START ON 6/23/2023] insulin detemir U-100 (Levemir) pen 8 Units Daily    isosorbide mononitrate 24 hr tablet 30 mg Daily    lisinopriL tablet 40 mg QHS    melatonin tablet 3 mg Nightly PRN    metroNIDAZOLE tablet 500 mg Q8H    mupirocin 2 % ointment BID    NIFEdipine 24 hr tablet 60 mg BID    sevelamer carbonate tablet 800 mg TID    sodium chloride 0.9% bolus 250 mL 250 mL PRN    sodium chloride 0.9% flush 10 mL PRN    sodium chloride 0.9% flush 10 mL PRN    tiotropium bromide 2.5 mcg/actuation inhaler 2 puff Daily       Objective:     Vital Signs (Most Recent):  Temp: 97.7 °F (36.5 °C) (06/22/23 0800)  Pulse: 68 (06/22/23 0900)  Resp: 15 (06/22/23 0900)  BP: (!) 203/95 (MD at bedside, no new orders at this time.) (06/22/23 0900)  SpO2: 97 % (06/22/23 0900)  Vital Signs (24h Range):  Temp:  [97.4 °F (36.3 °C)-97.9 °F (36.6 °C)] 97.7 °F (36.5 °C)  Pulse:  [57-68] 68  Resp:  [14-28] 15  SpO2:  [90 %-100 %] 97 %  BP: (122-204)/(73-95) 203/95     Weight: 65.3 kg (143 lb 15.4 oz) (06/21/23 1330)  Body mass index is 21.26 kg/m².  Body surface area is 1.78 meters squared.    I/O last 3 completed shifts:  In: 1316.6 [P.O.:320; I.V.:300; Other:500; IV Piggyback:196.6]  Out: 3510 [Urine:10; Other:3500]     Physical Exam  Constitutional:       Appearance: Normal appearance. He is not ill-appearing.      Interventions: Nasal cannula in place.   HENT:      Head: Normocephalic and atraumatic.      Nose: Nose normal.      Mouth/Throat:      Mouth: Mucous membranes are moist.      Pharynx: Oropharynx is clear.   Eyes:      Conjunctiva/sclera: Conjunctivae normal.   Neck:      Vascular: JVD present.   Cardiovascular:      Rate and Rhythm: Regular rhythm. Bradycardia present.      Comments: LUE AVF with +thrill and +bruit   Pulmonary:      Breath sounds: Rales present.      Comments: NC  Abdominal:      General: Abdomen is flat.   Musculoskeletal:      Cervical back: Neck supple.      Right lower leg: No edema.      Left lower leg: No edema.   Skin:     General: Skin is warm and dry.   Neurological:      General: No focal deficit present.      Mental Status: He is alert and oriented to person, place, and time.   Psychiatric:         Mood and Affect: Mood normal.         Behavior: Behavior normal. Behavior is cooperative.        Significant Labs:  CBC:   Recent Labs   Lab 06/22/23 0317   WBC 4.03   RBC 3.72*   HGB 11.2*   HCT 35.3*   *   MCV 95   MCH 30.1   MCHC 31.7*     CMP:   Recent Labs   Lab 06/22/23 0317   *   CALCIUM 8.3*   ALBUMIN 2.9*   PROT 6.1   *   K 4.6   CO2 27   CL 96   BUN 28*   CREATININE 3.3*   ALKPHOS 173*   ALT 29   AST 23   BILITOT 0.5

## 2023-06-22 NOTE — PLAN OF CARE
CM[facility] DAILY GOALS       A: Awake    RASS: Goal -    Actual -     Restraint necessity:    B: Breathe   SBT: Not intubated   C: Coordinate A & B, analgesics/sedatives   Pain: managed    SAT: Not intubated  D: Delirium   CAM-ICU: Overall CAM-ICU: Negative  E: Early(intubated/ Progressive (non-intubated) Mobility   MOVE Screen: Pass   Activity: Activity Management: Rolling - L1  FAS: Feeding/Nutrition   Diet order: Diet/Nutrition Received: consistent carb/diabetic diet,    T: Thrombus   DVT prophylaxis: VTE Required Core Measure: Pharmacological prophylaxis initiated/maintained  H: HOB Elevation   Head of Bed (HOB) Positioning: HOB elevated  U: Ulcer Prophylaxis   GI: yes  G: Glucose control   managed Glycemic Management: blood glucose monitored  S: Skin   Bathing/Skin Care: bath, complete, linen changed, dressed/undressed, electrode patches/site rotation  Device Skin Pressure Protection: adhesive use limited, absorbent pad utilized/changed, pressure points protected  Pressure Reduction Devices: specialty bed utilized, positioning supports utilized, foam padding utilized  Pressure Reduction Techniques: weight shift assistance provided, pressure points protected  Skin Protection: adhesive use limited, incontinence pads utilized, protective footwear used, pulse oximeter probe site changed, tubing/devices free from skin contact  B: Bowel Function   no issues   I: Indwelling Catheters   Dutta necessity:     CVC necessity: No  D: De-escalation Antibiotics   No    Family/Goals of care/Code Status   Code Status: Full Code    24H Vital Sign Range  Temp:  [97.4 °F (36.3 °C)-98.2 °F (36.8 °C)]   Pulse:  [55-61]   Resp:  [14-30]   BP: (122-174)/()   SpO2:  [90 %-100 %]      Shift Events   No acute events throughout shift.     VS and assessment per flow sheet, patient progressing towards goals as tolerated, plan of care reviewed with Cosme Lewis, all concerns addressed, will continue to monitor.

## 2023-06-22 NOTE — SUBJECTIVE & OBJECTIVE
Interval History/Significant Events: No acute events overnight, afebrile. Improved supplemental oxygen requirements this morning and hydralazine 5mg IV x1 administered for elevated BP. Started on insulin infusion for hyperglycemia.        Review of Systems   Constitutional:  Positive for fatigue. Negative for chills and fever.   HENT:  Negative for trouble swallowing.    Respiratory:  Positive for cough and shortness of breath.    Cardiovascular:  Negative for chest pain and leg swelling.   Gastrointestinal:  Negative for constipation, diarrhea, nausea and vomiting.   Objective:     Vital Signs (Most Recent):  Temp: 97.7 °F (36.5 °C) (06/22/23 0800)  Pulse: 68 (06/22/23 0900)  Resp: 15 (06/22/23 0900)  BP: (!) 203/95 (MD at bedside, no new orders at this time.) (06/22/23 0900)  SpO2: 97 % (06/22/23 0900) Vital Signs (24h Range):  Temp:  [97.4 °F (36.3 °C)-97.9 °F (36.6 °C)] 97.7 °F (36.5 °C)  Pulse:  [57-68] 68  Resp:  [14-28] 15  SpO2:  [90 %-100 %] 97 %  BP: (122-204)/(73-95) 203/95   Weight: 65.3 kg (143 lb 15.4 oz)  Body mass index is 21.26 kg/m².      Intake/Output Summary (Last 24 hours) at 6/22/2023 1114  Last data filed at 6/22/2023 0955  Gross per 24 hour   Intake 816.64 ml   Output 10 ml   Net 806.64 ml          Physical Exam  Vitals and nursing note reviewed.   Constitutional:       General: He is awake. He is not in acute distress.     Appearance: He is not toxic-appearing or diaphoretic.      Interventions: Nasal cannula in place.   HENT:      Head: Normocephalic and atraumatic.      Mouth/Throat:      Mouth: Mucous membranes are moist.   Eyes:      General: No scleral icterus.        Right eye: No discharge.         Left eye: No discharge.   Cardiovascular:      Rate and Rhythm: Normal rate and regular rhythm.      Heart sounds: Normal heart sounds.   Pulmonary:      Effort: No respiratory distress.      Breath sounds: No wheezing.   Abdominal:      General: Bowel sounds are normal. There is  distension.      Palpations: Abdomen is soft.      Tenderness: There is no abdominal tenderness. There is no guarding.   Musculoskeletal:      Right lower leg: Edema present.      Left lower leg: Edema present.   Skin:     General: Skin is warm and dry.   Neurological:      Mental Status: He is alert, oriented to person, place, and time and easily aroused. Mental status is at baseline.   Psychiatric:         Mood and Affect: Mood normal.         Behavior: Behavior normal. Behavior is cooperative.          Vents:  Oxygen Concentration (%): 24 (06/22/23 0409)  Lines/Drains/Airways       Drain  Duration                  Hemodialysis AV Fistula Left upper arm -- days    Male External Urinary Catheter 06/21/23 0103 Medium 1 day              Peripheral Intravenous Line  Duration                  Peripheral IV - Single Lumen 06/21/23 1151 22 G Anterior;Right Forearm <1 day                  Significant Labs:    CBC/Anemia Profile:  Recent Labs   Lab 06/20/23 2232 06/21/23 0428 06/22/23 0317   WBC 4.72 3.36* 4.03   HGB 13.4* 12.8* 11.2*   HCT 42.4 40.6 35.3*    175 132*   MCV 94 96 95   RDW 14.4 14.5 14.7*        Chemistries:  Recent Labs   Lab 06/20/23 2232 06/21/23 0428 06/22/23 0317   * 135* 135*   K 4.2 3.9 4.6   CL 95 92* 96   CO2 21* 30* 27   BUN 25* 28* 28*   CREATININE 3.5* 3.7* 3.3*   CALCIUM 8.1* 8.2* 8.3*   ALBUMIN 2.8* 2.9* 2.9*   PROT 6.4 6.3 6.1   BILITOT 0.7 0.9 0.5   ALKPHOS 185* 195* 173*   ALT 36 32 29   AST 33 27 23   MG 2.2 2.1 2.2   PHOS  --  4.4 4.8*       All pertinent labs within the past 24 hours have been reviewed.    Significant Imaging:  I have reviewed all pertinent imaging results/findings within the past 24 hours.

## 2023-06-23 NOTE — ASSESSMENT & PLAN NOTE
Nutrition consulted. Most recent weight and BMI monitored-     Measurements:  Wt Readings from Last 1 Encounters:   06/21/23 65.3 kg (143 lb 15.4 oz)   Body mass index is 21.26 kg/m².    Patient has been screened and assessed by RD.    Malnutrition Type:  Context: chronic illness  Level: moderate    Malnutrition Characteristic Summary:  Weight Loss (Malnutrition): 10% in 6 months  Subcutaneous Fat (Malnutrition): moderate depletion  Muscle Mass (Malnutrition): moderate depletion    Interventions/Recommendations (treatment strategy):  1.

## 2023-06-23 NOTE — SUBJECTIVE & OBJECTIVE
Interval History/Significant Events: NAEO, sliightly hypertensive    Review of Systems   Constitutional:  Positive for fatigue. Negative for chills and fever.   HENT:  Negative for trouble swallowing.    Respiratory:  Positive for cough and shortness of breath.    Cardiovascular:  Negative for chest pain and leg swelling.   Gastrointestinal:  Negative for constipation, diarrhea, nausea and vomiting.   Objective:     Vital Signs (Most Recent):  Temp: 97.6 °F (36.4 °C) (06/23/23 0301)  Pulse: 62 (06/23/23 0716)  Resp: 20 (06/23/23 0700)  BP: (!) 152/68 (06/23/23 0700)  SpO2: (!) 94 % (06/23/23 0700) Vital Signs (24h Range):  Temp:  [97.4 °F (36.3 °C)-97.9 °F (36.6 °C)] 97.6 °F (36.4 °C)  Pulse:  [61-73] 62  Resp:  [14-27] 20  SpO2:  [91 %-100 %] 94 %  BP: (111-209)/(52-95) 152/68   Weight: 65.3 kg (143 lb 15.4 oz)  Body mass index is 21.26 kg/m².      Intake/Output Summary (Last 24 hours) at 6/23/2023 0740  Last data filed at 6/22/2023 2101  Gross per 24 hour   Intake 143.17 ml   Output 0 ml   Net 143.17 ml          Physical Exam  Vitals and nursing note reviewed.   Constitutional:       General: He is awake. He is not in acute distress.     Appearance: He is not toxic-appearing or diaphoretic.      Interventions: Nasal cannula in place.   HENT:      Head: Normocephalic and atraumatic.      Mouth/Throat:      Mouth: Mucous membranes are moist.   Eyes:      General: No scleral icterus.        Right eye: No discharge.         Left eye: No discharge.   Cardiovascular:      Rate and Rhythm: Normal rate and regular rhythm.      Heart sounds: Normal heart sounds.   Pulmonary:      Effort: No respiratory distress.      Breath sounds: No wheezing.   Abdominal:      General: Bowel sounds are normal. There is distension.      Palpations: Abdomen is soft.      Tenderness: There is no abdominal tenderness. There is no guarding.   Musculoskeletal:      Right lower leg: Edema present.      Left lower leg: Edema present.   Skin:      General: Skin is warm and dry.   Neurological:      Mental Status: He is alert, oriented to person, place, and time and easily aroused. Mental status is at baseline.   Psychiatric:         Mood and Affect: Mood normal.         Behavior: Behavior normal. Behavior is cooperative.          Vents:  Oxygen Concentration (%): 24 (06/22/23 2301)  Lines/Drains/Airways       Drain  Duration                  Hemodialysis AV Fistula Left upper arm -- days    Male External Urinary Catheter 06/21/23 0103 Medium 2 days              Peripheral Intravenous Line  Duration                  Peripheral IV - Single Lumen 06/21/23 1151 22 G Anterior;Right Forearm 1 day         Peripheral IV - Single Lumen 06/22/23 1200 20 G Right Antecubital <1 day                  Significant Labs:    CBC/Anemia Profile:  Recent Labs   Lab 06/22/23 0317 06/23/23  0441   WBC 4.03 3.25*   HGB 11.2* 10.3*   HCT 35.3* 32.6*   * 117*   MCV 95 94   RDW 14.7* 14.8*        Chemistries:  Recent Labs   Lab 06/22/23 0317 06/22/23  1207 06/23/23  0441   * 135* 138   K 4.6 4.5  4.5 4.6   CL 96 95 100   CO2 27 27 26   BUN 28* 31* 40*   CREATININE 3.3* 3.6* 4.2*   CALCIUM 8.3* 7.9* 7.8*   ALBUMIN 2.9*  --  2.6*   PROT 6.1  --  5.6*   BILITOT 0.5  --  0.5   ALKPHOS 173*  --  161*   ALT 29  --  25   AST 23  --  22   MG 2.2  --  2.2   PHOS 4.8*  --  4.0       All pertinent labs within the past 24 hours have been reviewed.    Significant Imaging:  I have reviewed all pertinent imaging results/findings within the past 24 hours.

## 2023-06-23 NOTE — PLAN OF CARE
[facility] DAILY GOALS       A: Awake    RASS: Goal - RASS Goal: 0-->alert and calm  Actual - RASS (Osorio Agitation-Sedation Scale): alert and calm   Restraint necessity:    B: Breathe   SBT: Not intubated   C: Coordinate A & B, analgesics/sedatives   Pain: managed    SAT: Not intubated  D: Delirium   CAM-ICU: Overall CAM-ICU: Negative  E: Early(intubated/ Progressive (non-intubated) Mobility   MOVE Screen: Fail   Activity: Activity Management: Rolling - L1  FAS: Feeding/Nutrition   Diet order: Diet/Nutrition Received: consistent carb/diabetic diet,    T: Thrombus   DVT prophylaxis: VTE Required Core Measure: Pharmacological prophylaxis initiated/maintained  H: HOB Elevation   Head of Bed (HOB) Positioning: HOB elevated  U: Ulcer Prophylaxis   GI: yes  G: Glucose control   managed Glycemic Management: blood glucose monitored  S: Skin   Bathing/Skin Care: bath, complete, dressed/undressed, linen changed  Device Skin Pressure Protection: adhesive use limited, absorbent pad utilized/changed, pressure points protected, positioning supports utilized  Pressure Reduction Devices: specialty bed utilized, positioning supports utilized  Pressure Reduction Techniques: weight shift assistance provided, pressure points protected  Skin Protection: adhesive use limited, protective footwear used, pulse oximeter probe site changed, transparent dressing maintained, tubing/devices free from skin contact  B: Bowel Function   no issues   I: Indwelling Catheters   Dutta necessity:     CVC necessity: No  D: De-escalation Antibiotics   No    Family/Goals of care/Code Status   Code Status: Full Code    24H Vital Sign Range  Temp:  [97.4 °F (36.3 °C)-97.9 °F (36.6 °C)]   Pulse:  [61-73]   Resp:  [14-27]   BP: (111-209)/(52-95)   SpO2:  [91 %-100 %]      Shift Events   Pt AOx4, afebrile, free of falls. Pt transitioned off insulin infusion at start of shift. Pt continues to endorse large appetite, diet order placed. Pt denies pain/distress.  Possible stepdown today.    VS and assessment per flow sheet, patient progressing towards goals as tolerated, plan of care reviewed with Cosme Lewis, all concerns addressed, will continue to monitor.

## 2023-06-23 NOTE — ASSESSMENT & PLAN NOTE
Outpatient HD Information:    -Outpatient HD unit: [facility]   -HD tx days: MWF   -Last HD tx prior to presentation: 6/16/23  -HD access: LUE AVF   -HD modality: iHD   -Residual urine: Anuric       Plan/Recommendations:  -HD today, UF 3L  -Renal diet, 1L fluid restrictions  -Strict I/O's and daily weights  -Daily renal function panels   -Renally dose meds

## 2023-06-23 NOTE — SUBJECTIVE & OBJECTIVE
Interval History:   NAEON, BPs better controlled this AM.  Seen on HD this morning and tolerating well.  Labs non-emergent this morning.  Plans for stepdown to floor once bed is available.    Review of patient's allergies indicates:  No Known Allergies  Current Facility-Administered Medications   Medication Frequency    albuterol-ipratropium 2.5 mg-0.5 mg/3 mL nebulizer solution 3 mL Q6H PRN    amoxicillin-clavulanate 500-125mg per tablet 500 mg Q12H    apixaban tablet 5 mg BID    artificial tears 0.5 % ophthalmic solution 1 drop 6x Daily    aspirin EC tablet 81 mg Daily    atorvastatin tablet 40 mg Daily    carvediloL tablet 6.25 mg BID    cetirizine tablet 10 mg Daily PRN    dextrose 10% bolus 125 mL 125 mL PRN    dextrose 10% bolus 250 mL 250 mL PRN    dextrose 40 % gel 15,000 mg PRN    dextrose 40 % gel 30,000 mg PRN    erythromycin 5 mg/gram (0.5 %) ophthalmic ointment Q8H    FLUoxetine capsule 40 mg Daily    fluticasone furoate-vilanteroL 100-25 mcg/dose diskus inhaler 1 puff Daily    glucagon (human recombinant) injection 1 mg PRN    heparin (porcine) injection 1,000 Units PRN    insulin aspart U-100 pen 1-10 Units QID (AC + HS) PRN    insulin detemir U-100 (Levemir) pen 3 Units QHS    insulin detemir U-100 (Levemir) pen 6 Units Daily    isosorbide mononitrate 24 hr tablet 30 mg Daily    lisinopriL tablet 40 mg QHS    melatonin tablet 3 mg Nightly PRN    mupirocin 2 % ointment BID    NIFEdipine 24 hr tablet 60 mg BID    sevelamer carbonate tablet 800 mg TID    sodium chloride 0.9% bolus 250 mL 250 mL PRN    sodium chloride 0.9% flush 10 mL PRN    sodium chloride 0.9% flush 10 mL PRN    tiotropium bromide 2.5 mcg/actuation inhaler 2 puff Daily       Objective:     Vital Signs (Most Recent):  Temp: 97.9 °F (36.6 °C) (06/23/23 0800)  Pulse: 65 (06/23/23 1015)  Resp: 16 (06/23/23 1015)  BP: (!) 160/74 (06/23/23 1015)  SpO2: 99 % (06/23/23 1015) Vital Signs (24h Range):  Temp:  [97.4 °F (36.3 °C)-97.9 °F (36.6  °C)] 97.9 °F (36.6 °C)  Pulse:  [61-73] 65  Resp:  [14-27] 16  SpO2:  [91 %-100 %] 99 %  BP: (111-209)/(52-90) 160/74     Weight: 65.3 kg (143 lb 15.4 oz) (06/21/23 1330)  Body mass index is 21.26 kg/m².  Body surface area is 1.78 meters squared.    I/O last 3 completed shifts:  In: 639.8 [P.O.:100; I.V.:343.2; IV Piggyback:196.6]  Out: 10 [Urine:10]     Physical Exam  Constitutional:       Appearance: Normal appearance. He is not ill-appearing.      Interventions: Nasal cannula in place.   HENT:      Head: Normocephalic and atraumatic.      Nose: Nose normal.      Mouth/Throat:      Mouth: Mucous membranes are moist.      Pharynx: Oropharynx is clear.   Eyes:      Conjunctiva/sclera: Conjunctivae normal.   Neck:      Vascular: JVD present.   Cardiovascular:      Rate and Rhythm: Regular rhythm. Bradycardia present.      Comments: LUE AVF with +thrill and +bruit   Pulmonary:      Breath sounds: No rales.      Comments: NC  Abdominal:      General: Abdomen is flat.   Musculoskeletal:      Cervical back: Neck supple.      Right lower leg: No edema.      Left lower leg: No edema.   Skin:     General: Skin is warm and dry.   Neurological:      General: No focal deficit present.      Mental Status: He is alert and oriented to person, place, and time.   Psychiatric:         Mood and Affect: Mood normal.         Behavior: Behavior normal. Behavior is cooperative.        Significant Labs:  CBC:   Recent Labs   Lab 06/23/23  0441   WBC 3.25*   RBC 3.46*   HGB 10.3*   HCT 32.6*   *   MCV 94   MCH 29.8   MCHC 31.6*     CMP:   Recent Labs   Lab 06/23/23  0441   *   CALCIUM 7.8*   ALBUMIN 2.6*   PROT 5.6*      K 4.6   CO2 26      BUN 40*   CREATININE 4.2*   ALKPHOS 161*   ALT 25   AST 22   BILITOT 0.5

## 2023-06-23 NOTE — ASSESSMENT & PLAN NOTE
-- patient with hx of heart failure with concurrent ESRD requiring HD. Patient sxs and clinical exam c/w acute decompensated HF.  -- on heart failure pathway  -- SAAG <1.1. Portal HTN unlikely  -- volume removal via HD  -- resume home Imdur, coreg, lisinopril  -- cardiac diet  Please follow up results of tumor markers and abnormal cells seen on cytology    Echo  Result Date: 3/20/2023  · The left ventricle is normal in size with normal systolic function.  · The estimated ejection fraction is 55%.  · Grade II left ventricular diastolic dysfunction.  · Moderate right ventricular enlargement with moderately reduced right   ventricular systolic function.  · Biatrial enlargement.  · Interatrial septum bows to left, consider elevated right atrial   pressure.  · Mild tricuspid regurgitation.  · The estimated PA systolic pressure is 54 mmHg.  · There is pulmonary hypertension.  · Elevated central venous pressure (15 mmHg).  · Small circumferential pericardial effusion.  · There are bilateral pleural effusions.  · There is ascites present.

## 2023-06-23 NOTE — ASSESSMENT & PLAN NOTE
Home Diabetes Medications listed on Chart                  insulin detemir U-100, Levemir, 100 unit/mL (3 mL) SubQ InPn pen Inject 8 Units into the skin once daily.    insulin detemir U-100, Levemir, 100 unit/mL (3 mL) SubQ InPn pen Inject 4 Units into the skin every evening.    insulin lispro (HUMALOG KWIKPEN INSULIN) 100 unit/mL pen Inject 8 Units into the skin 3 (three) times daily before meals.        Last A1c:   Lab Results   Component Value Date    HGBA1C 10.7 (H) 06/21/2023      Recent Labs     06/22/23  1505 06/22/23  1609 06/22/23  1705 06/22/23  1806 06/22/23  1904 06/22/23 2009   POCTGLUCOSE 364* 318* 278* 246* 195* 177*         Plan:  Antihyperglycemics (From admission, onward)    Start     Stop Route Frequency Ordered    06/22/23 1230  insulin regular in 0.9 % NaCl 100 unit/100 mL (1 unit/mL) infusion            -- IV Continuous 06/22/23 1130      - Started on insulin drip for hyperglycemia. Transition to scheduled dosing when stable.   - Diabetic diet  - POCT glucose ACHS  - Will monitor glucose results and adjust insulin regimen accordingly

## 2023-06-23 NOTE — PROGRESS NOTES
[facility] - Cardiac Medical [facility]  Critical Care Medicine  Progress Note    Patient Name: Cosme Lewis  MRN: [id number]  Admission Date: 6/20/2023  Hospital Length of Stay: 2 days  Code Status: Full Code  Attending Provider: Jhonny Lezama MD  Primary Care Provider: Primary Doctor No   Principal Problem: Acute on chronic respiratory failure with hypoxia and hypercapnia    Subjective:     HPI:  Mr. Cosme Lewis is a 56 y/o M with hx of HFpEF (LVEF 55%, Grade II DD on echo 3/20/23), chronic RF on home O2, IDDM2, ESRD on HD, COPD, HTN, muliple PEs on eliquis who presented to the ED from [facility] for evaluation of AMS and hypoxia. He was in his usual state of health until yesterday afternoon when staff noticed he was more lethargic than normal, and was satting in the 80s.     In the ED, patient afebrile, hypertensive to 170s systolic, HR 50s, hypoxic requiring 10L NRB. CBC grossly unchanged from BL with chronic leukopenia and anemia. CMP also grossly unchanged from BL, with known mild hyponatremia. Lactic elevated to 3.0. BNP 3200. Trops wnl. Procal and CRP elevated. CXR demonstrating bilateral edema as well as L pleural effusion. CTH showing L mastoiditis, otherwise no acute intracranial abnormality. Initial VBG demonstrating pH 7.30/PCO2 68. Patient given 80 lasix IV, duo-nebs, ceftriaxone, and solumedrol. repeat VBG with pH down to 7.27, CO2 76. Patient placed on bipap and MICU consulted for management of severe respiratory acidosis s/o HFE and COPD.          Hospital/ICU Course:  Admitted to Critical Care Medicine for acute respiratory failure requiring continuous BiPap use. Nephrology consult initiated dialysis for volume removal. BiPap weaned to nasal cannula. Started on Ceftriaxone/Metronidazole to address mastoiditis/pneumonia concerns and transitioned to Augmentin(ESRD dosage) to complete 7 day course. Underwent paracentesis for ascites. SAAG <1.1, unclear etiology. Cytology pending and malignancy markers ordered  for further evaluation.  Resumed all home medications and administered IV hydralazine x1 for elevated BP, Insulin infusion started for hyperglycemia with plans to transition to scheduled dosing when appropriate.Patient stepped down last night    Please follow up results of tumor markers and abnormal cells seen on cytology      Interval History/Significant Events: NAEO, sliightly hypertensive    Review of Systems   Constitutional:  Positive for fatigue. Negative for chills and fever.   HENT:  Negative for trouble swallowing.    Respiratory:  Positive for cough and shortness of breath.    Cardiovascular:  Negative for chest pain and leg swelling.   Gastrointestinal:  Negative for constipation, diarrhea, nausea and vomiting.   Objective:     Vital Signs (Most Recent):  Temp: 97.6 °F (36.4 °C) (06/23/23 0301)  Pulse: 62 (06/23/23 0716)  Resp: 20 (06/23/23 0700)  BP: (!) 152/68 (06/23/23 0700)  SpO2: (!) 94 % (06/23/23 0700) Vital Signs (24h Range):  Temp:  [97.4 °F (36.3 °C)-97.9 °F (36.6 °C)] 97.6 °F (36.4 °C)  Pulse:  [61-73] 62  Resp:  [14-27] 20  SpO2:  [91 %-100 %] 94 %  BP: (111-209)/(52-95) 152/68   Weight: 65.3 kg (143 lb 15.4 oz)  Body mass index is 21.26 kg/m².      Intake/Output Summary (Last 24 hours) at 6/23/2023 0740  Last data filed at 6/22/2023 2101  Gross per 24 hour   Intake 143.17 ml   Output 0 ml   Net 143.17 ml          Physical Exam  Vitals and nursing note reviewed.   Constitutional:       General: He is awake. He is not in acute distress.     Appearance: He is not toxic-appearing or diaphoretic.      Interventions: Nasal cannula in place.   HENT:      Head: Normocephalic and atraumatic.      Mouth/Throat:      Mouth: Mucous membranes are moist.   Eyes:      General: No scleral icterus.        Right eye: No discharge.         Left eye: No discharge.   Cardiovascular:      Rate and Rhythm: Normal rate and regular rhythm.      Heart sounds: Normal heart sounds.   Pulmonary:      Effort: No  respiratory distress.      Breath sounds: No wheezing.   Abdominal:      General: Bowel sounds are normal. There is distension.      Palpations: Abdomen is soft.      Tenderness: There is no abdominal tenderness. There is no guarding.   Musculoskeletal:      Right lower leg: Edema present.      Left lower leg: Edema present.   Skin:     General: Skin is warm and dry.   Neurological:      Mental Status: He is alert, oriented to person, place, and time and easily aroused. Mental status is at baseline.   Psychiatric:         Mood and Affect: Mood normal.         Behavior: Behavior normal. Behavior is cooperative.          Vents:  Oxygen Concentration (%): 24 (06/22/23 2301)  Lines/Drains/Airways       Drain  Duration                  Hemodialysis AV Fistula Left upper arm -- days    Male External Urinary Catheter 06/21/23 0103 Medium 2 days              Peripheral Intravenous Line  Duration                  Peripheral IV - Single Lumen 06/21/23 1151 22 G Anterior;Right Forearm 1 day         Peripheral IV - Single Lumen 06/22/23 1200 20 G Right Antecubital <1 day                  Significant Labs:    CBC/Anemia Profile:  Recent Labs   Lab 06/22/23  0317 06/23/23  0441   WBC 4.03 3.25*   HGB 11.2* 10.3*   HCT 35.3* 32.6*   * 117*   MCV 95 94   RDW 14.7* 14.8*        Chemistries:  Recent Labs   Lab 06/22/23  0317 06/22/23  1207 06/23/23  0441   * 135* 138   K 4.6 4.5  4.5 4.6   CL 96 95 100   CO2 27 27 26   BUN 28* 31* 40*   CREATININE 3.3* 3.6* 4.2*   CALCIUM 8.3* 7.9* 7.8*   ALBUMIN 2.9*  --  2.6*   PROT 6.1  --  5.6*   BILITOT 0.5  --  0.5   ALKPHOS 173*  --  161*   ALT 29  --  25   AST 23  --  22   MG 2.2  --  2.2   PHOS 4.8*  --  4.0       All pertinent labs within the past 24 hours have been reviewed.    Significant Imaging:  I have reviewed all pertinent imaging results/findings within the past 24 hours.      ABG  Recent Labs   Lab 06/21/23  1128   PH 7.234*   PO2 20*   PCO2 73.0*   HCO3 30.9*   BE 3      Assessment/Plan:     ENT  Mastoiditis of left side  -- on admission patient with notable L facial swelling. CTH showing concerns for L mastoiditis  -- Discussed with ENT on admission, low suspicion for mastoiditis.   -- Treated empirically with Ceftriaxone+ Flagyl and transitioned to PO augmentin to complete 7 day course    Pulmonary  * Acute on chronic respiratory failure with hypoxia and hypercapnia  58 y/o M with hx of HFpEF, COPD, multiple pulmonary emboli on eliquis presenting with acute on chronic hypoxic/hypercarbic respiratory failure. On exam patient with LE edema, bilat pulm edema on CXR, hypoxic requiring supplemental O2. ABG also with notable acidosis and hypercarbia. Current problem likely 2/2 acute decompensated heart failure worsened by significant volume overload in an anuric patient requiring HD. Following several hours of bipap, patient mentation significantly improved and patient conversing at baseline.     Patient with Hypercapnic and Hypoxic Respiratory failure which is Acute on chronic.  he is on home oxygen at 2 LPM. Supplemental oxygen was provided and noted- Oxygen Concentration (%):  [24] 24    Signs/symptoms of respiratory failure include- tachypnea, increased work of breathing, respiratory distress, use of accessory muscles and lethargy. Contributing diagnoses includes - CHF Labs and images were reviewed. Patient Has recent ABG, which has been reviewed. Will treat underlying causes and adjust management of respiratory failure as follows-         Intake/Output Summary (Last 24 hours) at 6/23/2023 0745  Last data filed at 6/22/2023 2101  Gross per 24 hour   Intake 143.17 ml   Output 0 ml   Net 143.17 ml     Net IO Since Admission: -2,050.19 mL [06/23/23 0745]    Plan:  -- Volume removal via HD per nephro  -- resume home antihypertensive medications for strict BP control  -- Bipap prn for severe acidosis and hypercarbia with q4h blood gas  -- rest of care as noted under respective  problems    COPD (chronic obstructive pulmonary disease)  -- hx of COPD on home Spiriva and rescue albuterol inhaler  -- s/p duo-nebs tx as well as dose of solumedrol in the ED  -- no evidence to suggest acute COPD exacerbation on admission  -- prn duo-nebs while admitted for wheezing  -- goal O2 sat 88-92%    Chronic hypoxemic respiratory failure  -- see acute on chronic respiratory failure    Cardiac/Vascular  Acute on chronic diastolic heart failure  -- patient with hx of heart failure with concurrent ESRD requiring HD. Patient sxs and clinical exam c/w acute decompensated HF.  -- on heart failure pathway  -- SAAG <1.1. Portal HTN unlikely  -- volume removal via HD  -- resume home Imdur, coreg, lisinopril  -- cardiac diet  Please follow up results of tumor markers and abnormal cells seen on cytology    Echo  Result Date: 3/20/2023  · The left ventricle is normal in size with normal systolic function.  · The estimated ejection fraction is 55%.  · Grade II left ventricular diastolic dysfunction.  · Moderate right ventricular enlargement with moderately reduced right   ventricular systolic function.  · Biatrial enlargement.  · Interatrial septum bows to left, consider elevated right atrial   pressure.  · Mild tricuspid regurgitation.  · The estimated PA systolic pressure is 54 mmHg.  · There is pulmonary hypertension.  · Elevated central venous pressure (15 mmHg).  · Small circumferential pericardial effusion.  · There are bilateral pleural effusions.  · There is ascites present.             Hypertension  -- on multiple medications currently including coreg, nifedipine, lisinopril, imdur  -- hypertensive on admit to 170s systolic, which could be contributing factor to pulmonary edema and respiratory failure  -- will resume all home antihypertensives for strict BP control     HLD (hyperlipidemia)  -- most recent LDL 40 on 10/12/2023  -- continue home atorvastatin 40mg qd while admitted  -- repeat lipid panel ordered  "on admission    Lab Results   Component Value Date    HDL 34 (L) 06/21/2023    LDLCALC 30.0 (L) 06/21/2023    TRIG 30 06/21/2023    CHOL 70 (L) 06/21/2023        Renal/  Chronic kidney disease-mineral and bone disorder  See "ESRD (end stage renal disease)" A&P    ESRD (end stage renal disease)  -- on HD as outpatient  -- nephrology consulted on admission for urgent HD given volume overload contributing to respiratory failure  -- continue home sevelamer 800mg TID    Hematology  Multiple subsegmental pulmonary emboli without acute cor pulmonale  -- continue home eliquis on admission    Oncology  Anemia in ESRD (end-stage renal disease)  -- Hgb on admission around baseline at 13.4  Recent Labs     06/21/23  0428 06/22/23  0317 06/23/23  0441   HGB 12.8* 11.2* 10.3*         -- daily CBC  -- transfuse for Hgb < 7    Endocrine  Moderate malnutrition  Nutrition consulted. Most recent weight and BMI monitored-     Measurements:  Wt Readings from Last 1 Encounters:   06/21/23 65.3 kg (143 lb 15.4 oz)   Body mass index is 21.26 kg/m².    Patient has been screened and assessed by RD.    Malnutrition Type:  Context: chronic illness  Level: moderate    Malnutrition Characteristic Summary:  Weight Loss (Malnutrition): 10% in 6 months  Subcutaneous Fat (Malnutrition): moderate depletion  Muscle Mass (Malnutrition): moderate depletion    Interventions/Recommendations (treatment strategy):  1.    Type 2 diabetes mellitus with hyperglycemia, with long-term current use of insulin    Home Diabetes Medications listed on Chart                  insulin detemir U-100, Levemir, 100 unit/mL (3 mL) SubQ InPn pen Inject 8 Units into the skin once daily.    insulin detemir U-100, Levemir, 100 unit/mL (3 mL) SubQ InPn pen Inject 4 Units into the skin every evening.    insulin lispro (HUMALOG KWIKPEN INSULIN) 100 unit/mL pen Inject 8 Units into the skin 3 (three) times daily before meals.        Last A1c:   Lab Results   Component Value Date    " HGBA1C 10.7 (H) 06/21/2023      Recent Labs     06/22/23  1505 06/22/23  1609 06/22/23  1705 06/22/23  1806 06/22/23  1904 06/22/23 2009   POCTGLUCOSE 364* 318* 278* 246* 195* 177*         Plan:  Antihyperglycemics (From admission, onward)    Start     Stop Route Frequency Ordered    06/22/23 1230  insulin regular in 0.9 % NaCl 100 unit/100 mL (1 unit/mL) infusion            -- IV Continuous 06/22/23 1130      - Started on insulin drip for hyperglycemia. Transition to scheduled dosing when stable.   - Diabetic diet  - POCT glucose ACHS  - Will monitor glucose results and adjust insulin regimen accordingly    Malnutrition of mild degree  -- nutrition consulted on admission for dietary assistance  -- cardiac diet while admitted with boost supplement    Diabetes mellitus with chronic kidney disease on chronic dialysis  -140        Critical Care Daily Checklist:     A: Awake: RASS Goal/Actual Goal: RASS Goal: 0-->alert and calm  Actual:     B: Spontaneous Breathing Trial Performed?    C: SAT & SBT Coordinated?  N/A                      D: Delirium: CAM-ICU Overall CAM-ICU: Negative   E: Early Mobility Performed? Yes   F: Feeding Goal: Goals: Meet % EEN, EPN by RD f/u date  Status: Nutrition Goal Status: new         Current Diet Order   Procedures    Diet diabetic [facility]; 2000 Calorie       Order Specific Question:   Indicate patient location for additional diet options:       Answer:   [facility]       Order Specific Question:   Total calories:       Answer:   2000 Calorie       AS: Analgesia/Sedation N/A   T: Thromboembolic Prophylaxis Apixaban   H: HOB > 300 Yes   U: Stress Ulcer Prophylaxis (if needed) N/A   G: Glucose Control Insulin infusion   B: Bowel Function Stool Occurrence: 1   I: Indwelling Catheter (Lines & Dutta) Necessity PIV   D: De-escalation of Antimicrobials/Pharmacotherapies D/C ceftriaxone/metronidazole. Start augmentin     Plan for the day/ETD Stepdown     Code  Status:  Family/Goals of Care: Full Code           Critical secondary to ready for stepdown     Critical care was time spent personally by me on the following activities: development of treatment plan with patient or surrogate and bedside caregivers, discussions with consultants, evaluation of patient's response to treatment, examination of patient, ordering and performing treatments and interventions, ordering and review of laboratory studies, ordering and review of radiographic studies, pulse oximetry, re-evaluation of patient's condition. This critical care time did not overlap with that of any other provider or involve time for any procedures.     Yonas Monae MD  Critical Care Medicine  [facility] - Cardiac Medical ICU

## 2023-06-23 NOTE — PROGRESS NOTES
[facility] - Cardiac Medical ICU  Nephrology  Progress Note    Patient Name: Cosme Lewis  MRN: [id number]  Admission Date: 6/20/2023  Hospital Length of Stay: 2 days  Attending Provider: Jhonny Lezama MD   Primary Care Physician: Primary Doctor No  Principal Problem:Acute on chronic respiratory failure with hypoxia and hypercapnia    Subjective:     HPI: Mr. Cosme Lewis is a 58 y/o M with hx of HFpEF (LVEF 55%, Grade II DD on echo 3/20/23), chronic RF on home O2, IDDM2, ESRD on HD (MWF), COPD, HTN, muliple PEs on eliquis who presented to the ED from [facility] for evaluation of AMS and hypoxia. He was in his usual state of health until yesterday afternoon when staff noticed he was more lethargic than normal, and was satting in the 80s.      In the ED, patient afebrile, hypertensive to 170s systolic, HR 50s, hypoxic requiring 10L NRB. CBC grossly unchanged from BL with chronic leukopenia and anemia. CMP also grossly unchanged from BL, with known mild hyponatremia. Lactic elevated to 3.0. BNP 3200. Trops wnl. Procal and CRP elevated. CXR demonstrating bilateral edema as well as L pleural effusion. CTH showing L mastoiditis, otherwise no acute intracranial abnormality. Initial VBG demonstrating pH 7.30/PCO2 68. Patient given 80 lasix IV, duo-nebs, ceftriaxone, and solumedrol. repeat VBG with pH down to 7.27, CO2 76. Patient placed on bipap and MICU consulted for management of severe respiratory acidosis s/o HFE and COPD. Nephrology was consulted for management of ESRD/HD.       Interval History:   NAEON, BPs better controlled this AM.  Seen on HD this morning and tolerating well.  Labs non-emergent this morning.  Plans for stepdown to floor once bed is available.    Review of patient's allergies indicates:  No Known Allergies  Current Facility-Administered Medications   Medication Frequency    albuterol-ipratropium 2.5 mg-0.5 mg/3 mL nebulizer solution 3 mL Q6H PRN    amoxicillin-clavulanate 500-125mg per tablet 500 mg  Q12H    apixaban tablet 5 mg BID    artificial tears 0.5 % ophthalmic solution 1 drop 6x Daily    aspirin EC tablet 81 mg Daily    atorvastatin tablet 40 mg Daily    carvediloL tablet 6.25 mg BID    cetirizine tablet 10 mg Daily PRN    dextrose 10% bolus 125 mL 125 mL PRN    dextrose 10% bolus 250 mL 250 mL PRN    dextrose 40 % gel 15,000 mg PRN    dextrose 40 % gel 30,000 mg PRN    erythromycin 5 mg/gram (0.5 %) ophthalmic ointment Q8H    FLUoxetine capsule 40 mg Daily    fluticasone furoate-vilanteroL 100-25 mcg/dose diskus inhaler 1 puff Daily    glucagon (human recombinant) injection 1 mg PRN    heparin (porcine) injection 1,000 Units PRN    insulin aspart U-100 pen 1-10 Units QID (AC + HS) PRN    insulin detemir U-100 (Levemir) pen 3 Units QHS    insulin detemir U-100 (Levemir) pen 6 Units Daily    isosorbide mononitrate 24 hr tablet 30 mg Daily    lisinopriL tablet 40 mg QHS    melatonin tablet 3 mg Nightly PRN    mupirocin 2 % ointment BID    NIFEdipine 24 hr tablet 60 mg BID    sevelamer carbonate tablet 800 mg TID    sodium chloride 0.9% bolus 250 mL 250 mL PRN    sodium chloride 0.9% flush 10 mL PRN    sodium chloride 0.9% flush 10 mL PRN    tiotropium bromide 2.5 mcg/actuation inhaler 2 puff Daily       Objective:     Vital Signs (Most Recent):  Temp: 97.9 °F (36.6 °C) (06/23/23 0800)  Pulse: 65 (06/23/23 1015)  Resp: 16 (06/23/23 1015)  BP: (!) 160/74 (06/23/23 1015)  SpO2: 99 % (06/23/23 1015) Vital Signs (24h Range):  Temp:  [97.4 °F (36.3 °C)-97.9 °F (36.6 °C)] 97.9 °F (36.6 °C)  Pulse:  [61-73] 65  Resp:  [14-27] 16  SpO2:  [91 %-100 %] 99 %  BP: (111-209)/(52-90) 160/74     Weight: 65.3 kg (143 lb 15.4 oz) (06/21/23 1330)  Body mass index is 21.26 kg/m².  Body surface area is 1.78 meters squared.    I/O last 3 completed shifts:  In: 639.8 [P.O.:100; I.V.:343.2; IV Piggyback:196.6]  Out: 10 [Urine:10]     Physical Exam  Constitutional:       Appearance: Normal appearance.  He is not ill-appearing.      Interventions: Nasal cannula in place.   HENT:      Head: Normocephalic and atraumatic.      Nose: Nose normal.      Mouth/Throat:      Mouth: Mucous membranes are moist.      Pharynx: Oropharynx is clear.   Eyes:      Conjunctiva/sclera: Conjunctivae normal.   Neck:      Vascular: JVD present.   Cardiovascular:      Rate and Rhythm: Regular rhythm. Bradycardia present.      Comments: LUE AVF with +thrill and +bruit   Pulmonary:      Breath sounds: No rales.      Comments: NC  Abdominal:      General: Abdomen is flat.   Musculoskeletal:      Cervical back: Neck supple.      Right lower leg: No edema.      Left lower leg: No edema.   Skin:     General: Skin is warm and dry.   Neurological:      General: No focal deficit present.      Mental Status: He is alert and oriented to person, place, and time.   Psychiatric:         Mood and Affect: Mood normal.         Behavior: Behavior normal. Behavior is cooperative.        Significant Labs:  CBC:   Recent Labs   Lab 06/23/23  0441   WBC 3.25*   RBC 3.46*   HGB 10.3*   HCT 32.6*   *   MCV 94   MCH 29.8   MCHC 31.6*     CMP:   Recent Labs   Lab 06/23/23  0441   *   CALCIUM 7.8*   ALBUMIN 2.6*   PROT 5.6*      K 4.6   CO2 26      BUN 40*   CREATININE 4.2*   ALKPHOS 161*   ALT 25   AST 22   BILITOT 0.5          Assessment/Plan:     Cardiac/Vascular  Hypertension  Carvedilol 6.25 mg BID  IMDUR 30 mg QD  Lisinopril 40 mg QHS  Nifedipine 60 mg BID    Improving, UF was HD      Renal/  Chronic kidney disease-mineral and bone disorder  -Renal diet   -Continue renvela     ESRD (end stage renal disease)  Outpatient HD Information:    -Outpatient HD unit: [facility]   -HD tx days: MWF   -Last HD tx prior to presentation: 6/16/23  -HD access: LUE AVF   -HD modality: iHD   -Residual urine: Anuric       Plan/Recommendations:  -HD today, UF 3L  -Renal diet, 1L fluid restrictions  -Strict I/O's and daily weights  -Daily renal function  panels   -Renally dose meds      Oncology  Anemia in ESRD (end-stage renal disease)  -Target Hg of 10-12  -no need for MARCIA therapy      Juventino Nguyen NP  Nephrology  [facility] - Cardiac Medical ICU

## 2023-06-23 NOTE — ASSESSMENT & PLAN NOTE
Carvedilol 6.25 mg BID  IMDUR 30 mg QD  Lisinopril 40 mg QHS  Nifedipine 60 mg BID    Improving, UF was HD

## 2023-06-23 NOTE — RESIDENT HANDOFF
Handoff     Primary Team: Networked reference to record PCT  Room Number: 6078/6078 A     Patient Name: Cosme Lewis MRN: [id number]     Date of Birth: [id number] Allergies: Patient has no known allergies.     Age: 57 y.o. Admit Date: 6/20/2023     Sex: male  BMI: Body mass index is 21.26 kg/m².     Code Status: Full Code            Reason for Admission: Acute on chronic respiratory failure with hypoxia and hypercapnia    Brief HPI   56 y/o M with hx of HFpEF (LVEF 55%, Grade II DD on echo 3/20/23), chronic RespFailure on home O2, IDDM2, ESRD on HD, COPD, HTN, muliple PEs on eliquis who presented from nursing home ([facility]) for evaluation of AMS and hypoxia requiring continuous BiPap. Workup notable for HFpEF exacerbation concerns which alleviated after diuresis and dialysis. Stable on minimal supplemental oxygen with nasal cannula. Elevated blood glucose which resolved with insulin drip and now back on scheduled insulin. Stable for stepdown.     -Workup also notable for possible mastoiditis concerns. Admitting team discussed with ENT, low suspicion for mastoiditis. However, covering mastoiditis and lower respiratory infection together with Augmentin(ESRD dosing).    -Upon admission, underwent paracentesis which showed SAAG 1. Unclear etiology. Ordered malignancy workup, and cytology pending.       Tasks   -Complete abx course for mastoiditis/lower respiratory infection concerns  -Followup malignancy workup labs, and cytology pending for SAAG<1.1    Mentored By: Dr. Lezama

## 2023-06-23 NOTE — ASSESSMENT & PLAN NOTE
56 y/o M with hx of HFpEF, COPD, multiple pulmonary emboli on eliquis presenting with acute on chronic hypoxic/hypercarbic respiratory failure. On exam patient with LE edema, bilat pulm edema on CXR, hypoxic requiring supplemental O2. ABG also with notable acidosis and hypercarbia. Current problem likely 2/2 acute decompensated heart failure worsened by significant volume overload in an anuric patient requiring HD. Following several hours of bipap, patient mentation significantly improved and patient conversing at baseline.     Patient with Hypercapnic and Hypoxic Respiratory failure which is Acute on chronic.  he is on home oxygen at 2 LPM. Supplemental oxygen was provided and noted- Oxygen Concentration (%):  [24] 24    Signs/symptoms of respiratory failure include- tachypnea, increased work of breathing, respiratory distress, use of accessory muscles and lethargy. Contributing diagnoses includes - CHF Labs and images were reviewed. Patient Has recent ABG, which has been reviewed. Will treat underlying causes and adjust management of respiratory failure as follows-         Intake/Output Summary (Last 24 hours) at 6/23/2023 0745  Last data filed at 6/22/2023 2101  Gross per 24 hour   Intake 143.17 ml   Output 0 ml   Net 143.17 ml     Net IO Since Admission: -2,050.19 mL [06/23/23 0745]    Plan:  -- Volume removal via HD per nephro  -- resume home antihypertensive medications for strict BP control  -- Bipap prn for severe acidosis and hypercarbia with q4h blood gas  -- rest of care as noted under respective problems

## 2023-06-23 NOTE — ASSESSMENT & PLAN NOTE
-- Hgb on admission around baseline at 13.4  Recent Labs     06/21/23  0428 06/22/23  0317 06/23/23  0441   HGB 12.8* 11.2* 10.3*         -- daily CBC  -- transfuse for Hgb < 7

## 2023-06-23 NOTE — PROGRESS NOTES
Bedside HD initiated, cannulate upper left arm avf  with 15 gauge needles. Good  blood flow noted. / uf net goal set for 3 liters as tolerated. B/P 175/73. Pulse 62, o2 at 95% on  1 liter nasal cannula . Patient alert and stable .

## 2023-06-23 NOTE — PROGRESS NOTES
Bedside HD completed, blood returned and needles pulled . Post bleeding time < 5 minutes. Net uf removed 3.5 liters . Post B/P 141/63, pulse 62. O2 sat 99% on  1 liter nasal o2 patient alert and stable

## 2023-06-24 NOTE — PLAN OF CARE
Problem: Infection  Goal: Absence of Infection Signs and Symptoms  Outcome: Ongoing, Progressing  Intervention: Prevent or Manage Infection  Flowsheets (Taken 6/24/2023 0624)  Fever Reduction/Comfort Measures: lightweight clothing  Infection Management: aseptic technique maintained  Isolation Precautions: precautions maintained     Problem: Adult Inpatient Plan of Care  Goal: Optimal Comfort and Wellbeing  Outcome: Ongoing, Progressing  Intervention: Monitor Pain and Promote Comfort  Flowsheets (Taken 6/24/2023 0624)  Pain Management Interventions:   pain management plan reviewed with patient/caregiver   care clustered     Problem: Impaired Wound Healing  Goal: Optimal Wound Healing  Outcome: Ongoing, Progressing  Intervention: Promote Wound Healing  Flowsheets (Taken 6/24/2023 0624)  Activity Management:   Rolling - L1   Sitting at edge of bed - L2   Arm raise - L1  Pain Management Interventions:   pain management plan reviewed with patient/caregiver   care clustered    Patient orientated to room.   Left arm av fistula w/ small drainage on access site  Redressed av fistula with new dressing, dry, clean, and intact.   Educated on diabetic diet. Educated on when to monitor blood sugar.   Patient has no pain and awake in bed w/ no complaints.   Informed patient to call RN for assistance and to not get out of bed.   Bed alarm on, bed lowered, side rails up X3, call light in reach.

## 2023-06-24 NOTE — PLAN OF CARE
Problem: Occupational Therapy  Goal: Occupational Therapy Goal  Description: Goals to be met by: 07/24/23     Patient will increase functional independence with ADLs by performing:    UE Dressing with Modified [place].  LE Dressing with Minimal Assistance.  Grooming while EOB with Modified [place].  Toileting from bedside commode with Minimal Assistance for hygiene and clothing management.   Toilet transfer to bedside commode with Minimal Assistance.  Upper extremity exercise program 3x10 reps per handout, with supervision.    Outcome: Ongoing, Progressing     Pt was agreeable to and participated in OT evaluation.  Pt lives in a nursing home and receives assistance with all ADLS and mobility skills.  Pt reports that he does attempt to help himself as much as possible and is able to perform standing transfers with increased time and assistance.  Pt completed evaluation and noted to perform ADLS with set up to max A and func mobility skills with set up - mod A.  Goals established to assist pt with returning to PLOF regarding ADLs and func mobility.  Pt will benefit from skilled OT services in order to increase his level of safety and independence with ADLs and mobility.      Ivory Solis, OT  6/24/2023

## 2023-06-24 NOTE — PT/OT/SLP EVAL
Occupational Therapy   Evaluation & Tx    Name: Cosme Lewis  MRN: [id number]  Admitting Diagnosis: Acute on chronic respiratory failure with hypoxia and hypercapnia  Recent Surgery: * No surgery found *      Recommendations:     Discharge Recommendations: home health OT (return to NH with HH therapy services)  Discharge Equipment Recommendations:  none  Barriers to discharge:  None    Assessment:     Cosme Lewis is a 57 y.o. male with a medical diagnosis of Acute on chronic respiratory failure with hypoxia and hypercapnia.  He presents with the following performance deficits affecting function: weakness, impaired endurance, impaired sensation, impaired self care skills, impaired functional mobility, gait instability, impaired balance, decreased coordination, decreased lower extremity function, decreased upper extremity function, decreased safety awareness, decreased ROM, impaired coordination, impaired skin.  Pt was agreeable to and participated in OT evaluation.  Pt lives in a nursing home and receives assistance with all ADLS and mobility skills.  Pt reports that he does attempt to help himself as much as possible and is able to perform standing transfers with increased time and assistance.  Pt completed evaluation and noted to perform ADLS with set up to max A and func mobility skills with set up - mod A.  Goals established to assist pt with returning to OF regarding ADLs and func mobility.  Pt will benefit from skilled OT services in order to increase his level of safety and independence with ADLs and mobility.      Rehab Prognosis: Fair; patient would benefit from acute skilled OT services to address these deficits and reach maximum level of function.       Plan:     Patient to be seen 3 x/week to address the above listed problems via self-care/home management, therapeutic activities, therapeutic exercises  Plan of Care Expires: 07/24/23  Plan of Care Reviewed with: patient    Subjective     Chief Complaint: none  given  Patient/Family Comments/goals: be able to perform better transfers    Occupational Profile:  Living Environment: pt is a resident at [facility]   Previous level of function: pt reports he receives assistance with ADLs and transfers to w/c - once in w/c able to propel independently - assistance for transfers varies according to pt  Equipment Used at Home: wheelchair  Assistance upon Discharge: 24/7 NH staff    Pain/Comfort:  Pain Rating 1: 0/10  Pain Rating Post-Intervention 1: 0/10    Patients cultural, spiritual, Jain conflicts given the current situation: no    Objective:     Communicated with: nurse prior to session.  Patient found right sidelying with bed alarm, telemetry, oxygen, pulse ox (continuous), Condom Catheter upon OT entry to room.    General Precautions: Standard, fall, aspiration, hearing impaired  Orthopedic Precautions: N/A  Braces: N/A  Respiratory Status: Nasal cannula, flow 1 L/min upon OT entry - increased to 2 L when sitting EOB by nurse due to decline of O2 to mid 80s - noted SOB    Occupational Performance:    Bed Mobility:    Patient completed Rolling/Turning to Left with  supervision and with side rail  Patient completed Rolling/Turning to Right with supervision and with side rail  Patient completed Supine to Sit with minimum assistance    Functional Mobility/Transfers:  Patient completed Sit <> Stand Transfer with moderate assistance  with  rolling walker   Functional Mobility: Pt unable to take side steps when asked due to weakness - immediately sat back on EOB after stand attempt - unable to fully stand (forward flexed posture with RW)    Activities of Daily Living:  Feeding:  set up A    Grooming: set up A - sitting EOB  Upper Body Dressing: minimum assistance    Lower Body Dressing: maximal assistance    Toileting: total assistance - condom catheter for urine - incontinent of bowels - needed A with all steps of toileting    Cognitive/Visual  Perceptual:  Cognitive/Psychosocial Skills:     -       Oriented to: Person, Place, Time, and Situation   -       Follows Commands/attention:Follows two-step commands  -       Communication: clear/fluent and [place]  -       Memory: Questionable historian  -       Safety awareness/insight to disability: impaired   -       Mood/Affect/Coping skills/emotional control: Appropriate to situation    Physical Exam:  Balance: -       Sitting EOB = good; Standing = poor  Postural examination/scapula alignment:    -       Rounded shoulders  -       Forward head  -       Posterior pelvic tilt  Skin integrity: Dry  Edema:  None noted  Upper Extremity Range of Motion:   BUEs = WFL  Upper Extremity Strength:  BUEs = WFL   Strength:  BUEs = WFL    AMPAC 6 Click ADL:  AMPAC Total Score: 15    Treatment & Education:  Pt completed ADLs and func mobility activities for tx session as noted above  Pt educated on role of OT and POC      Patient left sitting edge of bed with call button in reach and nurse notified    GOALS:   Multidisciplinary Problems       Occupational Therapy Goals          Problem: Occupational Therapy    Goal Priority Disciplines Outcome Interventions   Occupational Therapy Goal     OT, PT/OT Ongoing, Progressing    Description: Goals to be met by: 07/24/23     Patient will increase functional independence with ADLs by performing:    UE Dressing with Modified [place].  LE Dressing with Minimal Assistance.  Grooming while EOB with Modified [place].  Toileting from bedside commode with Minimal Assistance for hygiene and clothing management.   Toilet transfer to bedside commode with Minimal Assistance.  Upper extremity exercise program 3x10 reps per handout, with supervision.                         History:     Past Medical History:   Diagnosis Date    Chronic kidney disease-mineral and bone disorder 10/12/2022    COPD (chronic obstructive pulmonary disease)     Diabetes mellitus type 1     ESRD on dialysis      Hypertension     Hypervolemia 2/22/2023    Hyponatremia 3/4/2023    SOB (shortness of breath) 10/12/2022    Patient with history COPD treated with Ellipta the albuterol, fluticasone-salmeterol tachypneic in the ED saturating 94% and 6 L on nasal cannula, patient does not know how many  he uses it is in nursing home.    -duo nebs Q 4  Given that patient is a poor historian, and in the setting of left lower extremity edema and history of coagulopathy PE cannot be ruled out.  Will workup for possible infec         Past Surgical History:   Procedure Laterality Date    AV FISTULA PLACEMENT         Time Tracking:     OT Date of Treatment: 06/24/23  OT Start Time: 0850  OT Stop Time: 0926  OT Total Time (min): 36 min    Billable Minutes:Evaluation 10  Self Care/Home Management 26    6/24/2023

## 2023-06-24 NOTE — PLAN OF CARE
Problem: Adult Inpatient Plan of Care  Goal: Absence of Hospital-Acquired Illness or Injury  Outcome: Ongoing, Progressing  Intervention: Identify and Manage Fall Risk  Flowsheets (Taken 6/24/2023 1709)  Safety Promotion/Fall Prevention:   assistive device/personal item within reach   medications reviewed   lighting adjusted   side rails raised x 2  Plan of care was reviewed with the pt. Eric. VSS. Glucose monitoring was done. Supplemental insulin was given. MD was notified about pt's blood sugar being elevated. Intake and output was documented. No major changes throughout the day. Safety precautions were in placed.

## 2023-06-24 NOTE — NURSING TRANSFER
Nursing Transfer Note      6/24/2023     Reason patient is being transferred: stepdown    Transfer To: [id number]    Transfer via bed    Transfer with 1L to O2, cardiac monitoring, and pt belongings    Transported by RN and PCT    Medicines sent: Yes    Any special needs or follow-up needed: No    Chart send with patient: Yes    Upon arrival to floor: cardiac monitor applied, patient oriented to room, call bell in reach, and bed in lowest position

## 2023-06-24 NOTE — NURSING
.Nurses Note -- 4 Eyes      6/24/2023   6:49 AM      Skin assessed during: Transfer      [x] No Altered Skin Integrity Present    [x]Prevention Measures Documented      [] Yes- Altered Skin Integrity Present or Discovered   [] LDA Added if Not in Epic (Describe Wound)   [] New Altered Skin Integrity was Present on Admit and Documented in LDA   [] Wound Image Taken    Wound Care Consulted? No    Attending Nurse:  Bethanie Ochoa RN     Second RN/Staff Member:  Gladis Pires RN.

## 2023-06-25 NOTE — HPI
Copied from MICU team who admitted the patient:    Mr. Cosme Lewis is a 58 y/o M with hx of HFpEF (LVEF 55%, Grade II DD on echo 3/20/23), chronic RF on home O2, IDDM2, ESRD on HD, COPD, HTN, muliple PEs on eliquis who presented to the ED from [facility] for evaluation of AMS and hypoxia. He was in his usual state of health until yesterday afternoon when staff noticed he was more lethargic than normal, and was satting in the 80s.      In the ED, patient afebrile, hypertensive to 170s systolic, HR 50s, hypoxic requiring 10L NRB. CBC grossly unchanged from BL with chronic leukopenia and anemia. CMP also grossly unchanged from BL, with known mild hyponatremia. Lactic elevated to 3.0. BNP 3200. Trops wnl. Procal and CRP elevated. CXR demonstrating bilateral edema as well as L pleural effusion. CTH showing L mastoiditis, otherwise no acute intracranial abnormality. Initial VBG demonstrating pH 7.30/PCO2 68. Patient given 80 lasix IV, duo-nebs, ceftriaxone, and solumedrol. repeat VBG with pH down to 7.27, CO2 76. Patient placed on bipap and MICU consulted for management of severe respiratory acidosis s/o HFE and COPD.

## 2023-06-25 NOTE — SUBJECTIVE & OBJECTIVE
Interval History:     Review of Systems   All other systems reviewed and are negative.  Objective:   VS reviewed in epic     Physical Exam  Vitals and nursing note reviewed.   Constitutional:       General: He is sleeping. He is not in acute distress.     Appearance: He is not toxic-appearing.   Pulmonary:      Effort: Pulmonary effort is normal. No respiratory distress.   Musculoskeletal:      Right lower leg: No edema.      Left lower leg: No edema.   Neurological:      Mental Status: He is easily aroused.           Significant Labs: All pertinent labs within the past 24 hours have been reviewed.    Significant Imaging: I have reviewed all pertinent imaging results/findings within the past 24 hours.

## 2023-06-25 NOTE — PT/OT/SLP EVAL
Physical Therapy Evaluation and Discharge Note    Patient Name:  Cosme Lewis   MRN:  [id number]    Recommendations:     Discharge Recommendations: other (see comments)  Discharge Equipment Recommendations: none   Barriers to discharge: None    Assessment:     Cosme Lewis is a 57 y.o. male admitted with a medical diagnosis of Acute on chronic respiratory failure with hypoxia and hypercapnia. .  At this time, patient is functioning at their prior level of function and does not require further acute PT services. Pt requiring modA for bed to chair transfer, w/ pt reporting the assist required today is the same he needs at his NH with some days being better than others. Pt is safe to return back to NH w/ recommendations to continue HH therapy at the NH facility.    Recent Surgery: * No surgery found *      Plan:     During this hospitalization, patient does not require further acute PT services.  Please re-consult if situation changes.      Subjective     Chief Complaint: R knee pain  Patient/Family Comments/goals: get back to NH  Pain/Comfort:  Location 1:  (unrated R knee pain)    Patients cultural, spiritual, [place]ybeterian conflicts given the current situation: no    Living Environment:  Pt lives in a NH and requires assistance to get to w/c which he can then self-propel w/ BUE.   Prior to admission, patients level of function was assistance needed.  Equipment used at home: wheelchair.  DME owned (not currently used): none.  Upon discharge, patient will have assistance from NH staff.    Objective:     Communicated with Rn prior to session.  Patient found sitting edge of bed with telemetry, oxygen, pulse ox (continuous) upon PT entry to room.    General Precautions: Standard, fall, aspiration, hearing impaired    Orthopedic Precautions:N/A   Braces: N/A  Respiratory Status: Room air    Exams:  Sensation:    -       Impaired  light/touch BLE neuropathy  RLE ROM: WFL  RLE Strength: WFL  LLE ROM: WFL  LLE Strength:  WFL    Functional Mobility:  Transfers:     Sit to Stand:  moderate assistance with rolling walker  Bed to Chair: moderate assistance with  rolling walker  using  Step Transfer  Gait: 4 steps w/ Margy RW    AM-PAC 6 CLICK MOBILITY  Total Score:13       Treatment and Education:  Pt educated on PT POC/goals and d/c recs. All questions answered and pt in agreement w/ POC.   Pt educated on therex to perform 3x/day independently in order to maintain/progress mobility and strength and allow treatment time to focus on functional mobility. Pt demonstrated independence with all exercises and verbalized understanding: ankle pumps, LAQs, marching. Performed 3x10 ea on BLE    AM-PAC 6 CLICK MOBILITY  Total Score:13     Patient left up in chair with all lines intact, call button in reach, and RN notified.    GOALS:   Multidisciplinary Problems       Physical Therapy Goals       Not on file                    History:     Past Medical History:   Diagnosis Date    Chronic kidney disease-mineral and bone disorder 10/12/2022    COPD (chronic obstructive pulmonary disease)     Diabetes mellitus type 1     ESRD on dialysis     Hypertension     Hypervolemia 2/22/2023    Hyponatremia 3/4/2023    SOB (shortness of breath) 10/12/2022    Patient with history COPD treated with Ellipta the albuterol, fluticasone-salmeterol tachypneic in the ED saturating 94% and 6 L on nasal cannula, patient does not know how many  he uses it is in nursing home.    -duo nebs Q 4  Given that patient is a poor historian, and in the setting of left lower extremity edema and history of coagulopathy PE cannot be ruled out.  Will workup for possible infec       Past Surgical History:   Procedure Laterality Date    AV FISTULA PLACEMENT         Time Tracking:     PT Received On: 06/25/23  PT Start Time: 0805     PT Stop Time: 0818  PT Total Time (min): 13 min     Billable Minutes: Evaluation 5 and Therapeutic Exercise 8      06/25/2023

## 2023-06-25 NOTE — ASSESSMENT & PLAN NOTE
"1. Acute on chronic hypoxemic and hypercapnic respiratory failure- No PFts. Due to chronic HF/ESRD with volume overload. Reported h/o COPD. Off NIV with fluid removal with HD, continue supplemental oxygen.    2. HFpEF - volume management with HD, gdmt  3. H/o PE- continue eliquis    4. DM2 with hyperglycemia - insulin intensified today, carb restricted diet enforced.  5. ESRD on HD   6. COPD- no PFTs/historic diagnosis- On LAMA at home. Continue bds  7. HTN - continue meds.  8. Encephalopathy- hypercapnia/metabolic. CT imaging brain without acute findings and exam non-focal.- Resolved.   9. Ascites- SAAG <1.1- "other cells" identified on differential. Not SBP.. Although majority of evidence points to chronic volume overload + hypoalbuminemia-- Follow cytology results and tumor markers.  10. Mastoiditis- incidentally noted on CT imaging brain. Continue Augmentin Rx x 7 days per ENT.  11. Anemia- Hb stable. No blood loss      "

## 2023-06-25 NOTE — PLAN OF CARE
Problem: Infection  Goal: Absence of Infection Signs and Symptoms  Outcome: Ongoing, Progressing  Intervention: Prevent or Manage Infection  Flowsheets (Taken 6/25/2023 0511)  Fever Reduction/Comfort Measures: lightweight clothing  Isolation Precautions: precautions initiated     Problem: Adult Inpatient Plan of Care  Goal: Absence of Hospital-Acquired Illness or Injury  Outcome: Ongoing, Progressing  Intervention: Prevent Skin Injury  Flowsheets (Taken 6/25/2023 0511)  Body Position:   position changed independently   turned  Goal: Optimal Comfort and Wellbeing  Outcome: Ongoing, Progressing  Intervention: Monitor Pain and Promote Comfort  Flowsheets (Taken 6/25/2023 0511)  Pain Management Interventions:   pillow support provided   care clustered   quiet environment facilitated     Problem: Diabetes Comorbidity  Goal: Blood Glucose Level Within Targeted Range  Outcome: Ongoing, Progressing  Intervention: Monitor and Manage Glycemia  Flowsheets (Taken 6/25/2023 0511)  Glycemic Management:   blood glucose monitored   supplemental insulin given      Patient had no pain during shift, rested comfortably.   Educated patient on the excess of food intake and the need to monitor blood sugar.   Encouraged patient to turn frequently in bed to prevent skin injuries.

## 2023-06-25 NOTE — PLAN OF CARE
Problem: Adult Inpatient Plan of Care  Goal: Absence of Hospital-Acquired Illness or Injury  Outcome: Ongoing, Progressing  Intervention: Identify and Manage Fall Risk  Flowsheets (Taken 6/25/2023 1816)  Safety Promotion/Fall Prevention:   assistive device/personal item within reach   lighting adjusted   medications reviewed   side rails raised x 2  Plan of care was reviewed with the pt. Nely GARCIA. Glucose monitoring was done. Supplemental insulin was given. Intake and output was documented. No major changes throughout the day. Safety precautions were in placed.

## 2023-06-25 NOTE — PROGRESS NOTES
Nick Menjivar - Telemetry [facility] Medicine  Progress Note    Patient Name: Cosme Lewis  MRN: [id number]  Patient Class: IP- Inpatient   Admission Date: 6/20/2023  Length of Stay: 3 days  Attending Physician: Cecy Garcia MD  Primary Care Provider: Primary Doctor No        Subjective:     Principal Problem:Acute on chronic respiratory failure with hypoxia and hypercapnia        HPI:  Copied from MICU team who admitted the patient:    Mr. Cosme Lewis is a 58 y/o M with hx of HFpEF (LVEF 55%, Grade II DD on echo 3/20/23), chronic RF on home O2, IDDM2, ESRD on HD, COPD, HTN, muliple PEs on eliquis who presented to the ED from [facility] for evaluation of AMS and hypoxia. He was in his usual state of health until yesterday afternoon when staff noticed he was more lethargic than normal, and was satting in the 80s.      In the ED, patient afebrile, hypertensive to 170s systolic, HR 50s, hypoxic requiring 10L NRB. CBC grossly unchanged from BL with chronic leukopenia and anemia. CMP also grossly unchanged from BL, with known mild hyponatremia. Lactic elevated to 3.0. BNP 3200. Trops wnl. Procal and CRP elevated. CXR demonstrating bilateral edema as well as L pleural effusion. CTH showing L mastoiditis, otherwise no acute intracranial abnormality. Initial VBG demonstrating pH 7.30/PCO2 68. Patient given 80 lasix IV, duo-nebs, ceftriaxone, and solumedrol. repeat VBG with pH down to 7.27, CO2 76. Patient placed on bipap and MICU consulted for management of severe respiratory acidosis s/o HFE and COPD.            Overview/Hospital Course:  Admitted to Critical Care Medicine for acute respiratory failure requiring continuous BiPap use. Nephrology consult initiated dialysis for volume removal. BiPap weaned to nasal cannula. Started on Ceftriaxone/Metronidazole to address mastoiditis/pneumonia concerns and transitioned to Augmentin(ESRD dosage) to complete 7 day course. Underwent paracentesis for ascites. SAAG <1.1, unclear  "etiology. Cytology pending and malignancy markers ordered for further evaluation.  Resumed all home medications and administered IV hydralazine x1 for elevated BP, Insulin infusion started for hyperglycemia with plans to transition to scheduled dosing when appropriate.   Stepped down to  6/23/23.        Interval History:     Review of Systems   All other systems reviewed and are negative.  Objective:   VS reviewed in epic     Physical Exam  Vitals and nursing note reviewed.   Constitutional:       General: He is sleeping. He is not in acute distress.     Appearance: He is not toxic-appearing.   Pulmonary:      Effort: Pulmonary effort is normal. No respiratory distress.   Musculoskeletal:      Right lower leg: No edema.      Left lower leg: No edema.   Neurological:      Mental Status: He is easily aroused.           Significant Labs: All pertinent labs within the past 24 hours have been reviewed.    Significant Imaging: I have reviewed all pertinent imaging results/findings within the past 24 hours.      Assessment/Plan:      * Acute on chronic respiratory failure with hypoxia and hypercapnia  1. Acute on chronic hypoxemic and hypercapnic respiratory failure- No PFts. Due to chronic HF/ESRD with volume overload. Reported h/o COPD. Off NIV with fluid removal with HD, continue supplemental oxygen.    2. HFpEF - volume management with HD, gdmt  3. H/o PE- continue eliquis    4. DM2 with hyperglycemia - insulin intensified today, carb restricted diet enforced.  5. ESRD on HD   6. COPD- no PFTs/historic diagnosis- On LAMA at home. Continue bds  7. HTN - continue meds.  8. Encephalopathy- hypercapnia/metabolic. CT imaging brain without acute findings and exam non-focal.- Resolved.   9. Ascites- SAAG <1.1- "other cells" identified on differential. Not SBP.. Although majority of evidence points to chronic volume overload + hypoalbuminemia-- Follow cytology results and tumor markers.  10. Mastoiditis- incidentally noted " on CT imaging brain. Continue Augmentin Rx x 7 days per ENT.  11. Anemia- Hb stable. No blood loss        ESRD (end stage renal disease)  Nephrology consulted for HD.        VTE Risk Mitigation (From admission, onward)         Ordered     apixaban tablet 5 mg  2 times daily         06/21/23 0244     heparin (porcine) injection 1,000 Units  As needed (PRN)         06/21/23 0602     IP VTE HIGH RISK PATIENT  Once         06/21/23 0222     Place sequential compression device  Until discontinued         06/21/23 0222                Discharge Planning   GERA: 6/27/2023     Code Status: Full Code   Is the patient medically ready for discharge?:     Reason for patient still in hospital (select all that apply): Patient trending condition, Laboratory test and Treatment  Discharge Plan A: Return to nursing home                  Cecy Garcia MD  Department of Hospital Medicine   Nick Menjivar - Telemetry Stepdown

## 2023-06-25 NOTE — SUBJECTIVE & OBJECTIVE
Interval History: no major issues reported to me by nursing staff, accuchecks are better today, stable sats on 3L    Review of Systems   Constitutional:  Negative for chills and fever.   Respiratory:  Negative for cough and shortness of breath.    Cardiovascular:  Negative for chest pain.   All other systems reviewed and are negative.  Objective:     Vital Signs (Most Recent):  Temp: 97.6 °F (36.4 °C) (06/25/23 1513)  Pulse: 60 (06/25/23 1513)  Resp: 18 (06/25/23 1513)  BP: 128/74 (06/25/23 1513)  SpO2: 95 % (06/25/23 1513) Vital Signs (24h Range):  Temp:  [97.4 °F (36.3 °C)-97.8 °F (36.6 °C)] 97.6 °F (36.4 °C)  Pulse:  [58-71] 60  Resp:  [16-19] 18  SpO2:  [92 %-99 %] 95 %  BP: (126-153)/(72-86) 128/74     Weight: 69.2 kg (152 lb 9.6 oz)  Body mass index is 22.54 kg/m².    Intake/Output Summary (Last 24 hours) at 6/25/2023 1625  Last data filed at 6/25/2023 1240  Gross per 24 hour   Intake 1080 ml   Output 50 ml   Net 1030 ml         Physical Exam  Vitals and nursing note reviewed.   Constitutional:       General: He is not in acute distress.     Appearance: He is not toxic-appearing.   HENT:      Head: Normocephalic and atraumatic.   Eyes:      General: No scleral icterus.     Extraocular Movements: Extraocular movements intact.   Cardiovascular:      Rate and Rhythm: Normal rate and regular rhythm.   Pulmonary:      Effort: Pulmonary effort is normal. No respiratory distress.   Neurological:      Mental Status: He is alert.           Significant Labs: All pertinent labs within the past 24 hours have been reviewed.  CBC:   Recent Labs   Lab 06/25/23 0258   WBC 3.18*   HGB 10.0*   HCT 32.0*   *     CMP:   Recent Labs   Lab 06/25/23 0258   *   K 4.8      CO2 20*   *   BUN 42*   CREATININE 4.8*   CALCIUM 7.6*   PROT 6.0   ALBUMIN 2.9*   BILITOT 0.5   ALKPHOS 167*   AST 23   ALT 24   ANIONGAP 14       Significant Imaging: I have reviewed all pertinent imaging results/findings within the  past 24 hours.

## 2023-06-25 NOTE — HOSPITAL COURSE
Admitted to Critical Care Medicine for acute respiratory failure requiring continuous BiPap use. Nephrology consult initiated dialysis for volume removal. BiPap weaned to nasal cannula. Started on Ceftriaxone/Metronidazole to address mastoiditis/pneumonia concerns and transitioned to Augmentin(ESRD dosage) to complete 7 day course. Underwent paracentesis for ascites. SAAG <1.1, likely related to hypoalbuminemia and decompensated heart failure. Cytology -negative for malignancy, tumor markers- non-specific mild elevation. Resumed all home medications for blood pressure controlled. Insulin infusion started for hyperglycemia and was transitioned to basal/prandial injections - further intensified for glycemic control. Stepped down to  6/23/23. Had bleeding from avf after last round of HD, therefore discharge postponed. Eliquis and aspirin held, hemostasis achieved, vascular sx consulted. Monitoring for recurrent bleeding after next round of HD (today).    6/29 ICU stepdown for acute on chronic hypercapnic respiratory failure, encephalopathy due to adhf, resolved with HD and continuous BIPAP. Remains in the hospital due bleeding from dialysis graft. Held eliquis (PE) and aspirin. Vascular surgery is consulted, US completed - no stenosis/aneurysm.  Bleeding controlled with pressure dressing. vascular sx recs holding ASA and Eliquis. OK for DVT prophylaxis. patient had PE 10/22 Pulmonary thromboembolism within a lobar branch to the right lower lobe noting additional pulmonary thromboembolism within segmental branches to the right upper lobe. on oxygen 3LNC . Plan is to do LUE fistulogram Monday. resumed ASA and eliquis per vascular surgery   6/30 HB stable on eliquis. discharge to NH with vasular sx follow up for LUE fistulogram Monday.  no need to hold ASA and eliquis per vascular surgery .  scheduled for 9 AM fistulogram on 7/3/23  he will need to arrive at 7 AM  to cath lab on 3rd floor. NPO midnight.

## 2023-06-25 NOTE — PROGRESS NOTES
Nick Menjivar - Telemetry [facility] Medicine  Progress Note    Patient Name: Cosme Lewis  MRN: [id number]  Patient Class: IP- Inpatient   Admission Date: 6/20/2023  Length of Stay: 4 days  Attending Physician: Cecy Garcia MD  Primary Care Provider: Primary Doctor No        Subjective:     Principal Problem:Acute on chronic respiratory failure with hypoxia and hypercapnia        HPI:  Copied from MICU team who admitted the patient:    Mr. Cosme Lewis is a 58 y/o M with hx of HFpEF (LVEF 55%, Grade II DD on echo 3/20/23), chronic RF on home O2, IDDM2, ESRD on HD, COPD, HTN, muliple PEs on eliquis who presented to the ED from [facility] for evaluation of AMS and hypoxia. He was in his usual state of health until yesterday afternoon when staff noticed he was more lethargic than normal, and was satting in the 80s.      In the ED, patient afebrile, hypertensive to 170s systolic, HR 50s, hypoxic requiring 10L NRB. CBC grossly unchanged from BL with chronic leukopenia and anemia. CMP also grossly unchanged from BL, with known mild hyponatremia. Lactic elevated to 3.0. BNP 3200. Trops wnl. Procal and CRP elevated. CXR demonstrating bilateral edema as well as L pleural effusion. CTH showing L mastoiditis, otherwise no acute intracranial abnormality. Initial VBG demonstrating pH 7.30/PCO2 68. Patient given 80 lasix IV, duo-nebs, ceftriaxone, and solumedrol. repeat VBG with pH down to 7.27, CO2 76. Patient placed on bipap and MICU consulted for management of severe respiratory acidosis s/o HFE and COPD.            Overview/Hospital Course:  Admitted to Critical Care Medicine for acute respiratory failure requiring continuous BiPap use. Nephrology consult initiated dialysis for volume removal. BiPap weaned to nasal cannula. Started on Ceftriaxone/Metronidazole to address mastoiditis/pneumonia concerns and transitioned to Augmentin(ESRD dosage) to complete 7 day course. Underwent paracentesis for ascites. SAAG <1.1, unclear  etiology. Cytology pending and malignancy markers ordered for further evaluation.  Resumed all home medications and administered IV hydralazine x1 for elevated BP, Insulin infusion started for hyperglycemia with plans to transition to scheduled dosing when appropriate.   Stepped down to  6/23/23.        Interval History: no major issues reported to me by nursing staff, accuchecks are better today, stable sats on 3L    Review of Systems   Constitutional:  Negative for chills and fever.   Respiratory:  Negative for cough and shortness of breath.    Cardiovascular:  Negative for chest pain.   All other systems reviewed and are negative.  Objective:     Vital Signs (Most Recent):  Temp: 97.6 °F (36.4 °C) (06/25/23 1513)  Pulse: 60 (06/25/23 1513)  Resp: 18 (06/25/23 1513)  BP: 128/74 (06/25/23 1513)  SpO2: 95 % (06/25/23 1513) Vital Signs (24h Range):  Temp:  [97.4 °F (36.3 °C)-97.8 °F (36.6 °C)] 97.6 °F (36.4 °C)  Pulse:  [58-71] 60  Resp:  [16-19] 18  SpO2:  [92 %-99 %] 95 %  BP: (126-153)/(72-86) 128/74     Weight: 69.2 kg (152 lb 9.6 oz)  Body mass index is 22.54 kg/m².    Intake/Output Summary (Last 24 hours) at 6/25/2023 1625  Last data filed at 6/25/2023 1240  Gross per 24 hour   Intake 1080 ml   Output 50 ml   Net 1030 ml         Physical Exam  Vitals and nursing note reviewed.   Constitutional:       General: He is not in acute distress.     Appearance: He is not toxic-appearing.   HENT:      Head: Normocephalic and atraumatic.   Eyes:      General: No scleral icterus.     Extraocular Movements: Extraocular movements intact.   Cardiovascular:      Rate and Rhythm: Normal rate and regular rhythm.   Pulmonary:      Effort: Pulmonary effort is normal. No respiratory distress.   Neurological:      Mental Status: He is alert.           Significant Labs: All pertinent labs within the past 24 hours have been reviewed.  CBC:   Recent Labs   Lab 06/25/23  0258   WBC 3.18*   HGB 10.0*   HCT 32.0*   *     CMP:  "  Recent Labs   Lab 06/25/23  0258   *   K 4.8      CO2 20*   *   BUN 42*   CREATININE 4.8*   CALCIUM 7.6*   PROT 6.0   ALBUMIN 2.9*   BILITOT 0.5   ALKPHOS 167*   AST 23   ALT 24   ANIONGAP 14       Significant Imaging: I have reviewed all pertinent imaging results/findings within the past 24 hours.      Assessment/Plan:      * Acute on chronic respiratory failure with hypoxia and hypercapnia  1. Acute on chronic hypoxemic and hypercapnic respiratory failure- No PFts. Due to chronic HF/ESRD with volume overload. Reported h/o COPD. Off NIV with fluid removal with HD, continue supplemental oxygen.    2. HFpEF - volume management with HD, gdmt  3. H/o PE- continue eliquis    4. DM2 with hyperglycemia - insulin intensified 6/24, carb restricted diet enforced. Better- no changes to rx today  5. ESRD on HD MWF - per nephro  6. COPD- no PFTs/historic diagnosis- On LAMA at home. Continue bds  7. HTN - continue meds.  8. Encephalopathy- hypercapnia/metabolic. CT imaging brain without acute findings and exam non-focal.- Resolved.   9. Ascites- SAAG <1.1- "other cells" identified on differential. Not SBP.. Although majority of evidence points to chronic volume overload + hypoalbuminemia-- Follow cytology results and tumor markers.  10. Mastoiditis- incidentally noted on CT imaging brain. Continue Augmentin Rx x 7 days per ENT.  11. Anemia- Hb stable. No blood loss   12. Discharge planning: pt/ot evaluation       ESRD (end stage renal disease)  Nephrology consulted for HD.        VTE Risk Mitigation (From admission, onward)         Ordered     apixaban tablet 5 mg  2 times daily         06/21/23 0244     heparin (porcine) injection 1,000 Units  As needed (PRN)         06/21/23 0602     IP VTE HIGH RISK PATIENT  Once         06/21/23 0222     Place sequential compression device  Until discontinued         06/21/23 0222                Discharge Planning   GERA: 6/27/2023     Code Status: Full Code   Is the " patient medically ready for discharge?: No    Reason for patient still in hospital (select all that apply): Patient trending condition and Laboratory test  Discharge Plan A: Return to nursing home                  Cecy Garcia MD  Department of Hospital Medicine   Nick nigel - Telemetry Stepdown

## 2023-06-25 NOTE — ASSESSMENT & PLAN NOTE
"1. Acute on chronic hypoxemic and hypercapnic respiratory failure- No PFts. Due to chronic HF/ESRD with volume overload. Reported h/o COPD. Off NIV with fluid removal with HD, continue supplemental oxygen.    2. HFpEF - volume management with HD, gdmt  3. H/o PE- continue eliquis    4. DM2 with hyperglycemia - insulin intensified 6/24, carb restricted diet enforced. Better- no changes to rx today  5. ESRD on HD MWF - per nephro  6. COPD- no PFTs/historic diagnosis- On LAMA at home. Continue bds  7. HTN - continue meds.  8. Encephalopathy- hypercapnia/metabolic. CT imaging brain without acute findings and exam non-focal.- Resolved.   9. Ascites- SAAG <1.1- "other cells" identified on differential. Not SBP.. Although majority of evidence points to chronic volume overload + hypoalbuminemia-- Follow cytology results and tumor markers.  10. Mastoiditis- incidentally noted on CT imaging brain. Continue Augmentin Rx x 7 days per ENT.  11. Anemia- Hb stable. No blood loss   12. Discharge planning: pt/ot evaluation     "

## 2023-06-26 NOTE — PROGRESS NOTES
Patient arrived via stretcher.  Awake, alert and responsive.  VSS.  HD  maintenance TX  MWF) started via UMA AV fistula using 15 g needles.

## 2023-06-26 NOTE — PROGRESS NOTES
Nick Menjivar - Telemetry [facility] Medicine  Progress Note    Patient Name: Cosme Lewis  MRN: [id number]  Patient Class: IP- Inpatient   Admission Date: 6/20/2023  Length of Stay: 5 days  Attending Physician: Cecy Garcia MD  Primary Care Provider: Primary Doctor No        Subjective:     Principal Problem:Acute on chronic respiratory failure with hypoxia and hypercapnia        HPI:  Copied from MICU team who admitted the patient:    Mr. Cosme Lewis is a 56 y/o M with hx of HFpEF (LVEF 55%, Grade II DD on echo 3/20/23), chronic RF on home O2, IDDM2, ESRD on HD, COPD, HTN, muliple PEs on eliquis who presented to the ED from [facility] for evaluation of AMS and hypoxia. He was in his usual state of health until yesterday afternoon when staff noticed he was more lethargic than normal, and was satting in the 80s.      In the ED, patient afebrile, hypertensive to 170s systolic, HR 50s, hypoxic requiring 10L NRB. CBC grossly unchanged from BL with chronic leukopenia and anemia. CMP also grossly unchanged from BL, with known mild hyponatremia. Lactic elevated to 3.0. BNP 3200. Trops wnl. Procal and CRP elevated. CXR demonstrating bilateral edema as well as L pleural effusion. CTH showing L mastoiditis, otherwise no acute intracranial abnormality. Initial VBG demonstrating pH 7.30/PCO2 68. Patient given 80 lasix IV, duo-nebs, ceftriaxone, and solumedrol. repeat VBG with pH down to 7.27, CO2 76. Patient placed on bipap and MICU consulted for management of severe respiratory acidosis s/o HFE and COPD.            Overview/Hospital Course:  Admitted to Critical Care Medicine for acute respiratory failure requiring continuous BiPap use. Nephrology consult initiated dialysis for volume removal. BiPap weaned to nasal cannula. Started on Ceftriaxone/Metronidazole to address mastoiditis/pneumonia concerns and transitioned to Augmentin(ESRD dosage) to complete 7 day course. Underwent paracentesis for ascites. SAAG <1.1, unclear  etiology. Cytology pending and malignancy markers ordered for further evaluation.  Resumed all home medications and administered IV hydralazine x1 for elevated BP, Insulin infusion started for hyperglycemia with plans to transition to scheduled dosing when appropriate.   Stepped down to  6/23/23.        Interval History: K 6.6 this morning, no symptoms - dialysis as scheduled    Review of Systems   All other systems reviewed and are negative.  Objective:     Vital Signs (Most Recent):  Temp: 97 °F (36.1 °C) (06/26/23 0925)  Pulse: 64 (06/26/23 1015)  Resp: 19 (06/26/23 0925)  BP: (!) 148/74 (06/26/23 1015)  SpO2: 96 % (06/26/23 0925) Vital Signs (24h Range):  Temp:  [96.9 °F (36.1 °C)-98.4 °F (36.9 °C)] 97 °F (36.1 °C)  Pulse:  [58-68] 64  Resp:  [17-19] 19  SpO2:  [90 %-99 %] 96 %  BP: (128-153)/(68-81) 148/74     Weight: 70.6 kg (155 lb 10.3 oz)  Body mass index is 22.98 kg/m².    Intake/Output Summary (Last 24 hours) at 6/26/2023 1106  Last data filed at 6/26/2023 0335  Gross per 24 hour   Intake 600 ml   Output --   Net 600 ml         Physical Exam  Vitals and nursing note reviewed.   Constitutional:       General: He is not in acute distress.     Appearance: He is not toxic-appearing.   HENT:      Head: Normocephalic and atraumatic.   Pulmonary:      Effort: Pulmonary effort is normal. No respiratory distress.   Musculoskeletal:      Right lower leg: No edema.      Left lower leg: No edema.   Neurological:      Mental Status: He is alert. Mental status is at baseline.   Psychiatric:         Behavior: Behavior normal.           Significant Labs: All pertinent labs within the past 24 hours have been reviewed.  CBC:   Recent Labs   Lab 06/25/23 0258 06/26/23  0513   WBC 3.18* 3.12*   HGB 10.0* 11.0*   HCT 32.0* 34.1*   * 151     CMP:   Recent Labs   Lab 06/25/23 0258 06/26/23  0658   * 131*   K 4.8 6.6*    102   CO2 20* 17*   * 205*   BUN 42* 57*   CREATININE 4.8* 5.6*   CALCIUM 7.6*  "7.7*   PROT 6.0 6.6   ALBUMIN 2.9* 2.9*   BILITOT 0.5 0.5   ALKPHOS 167* 165*   AST 23 38   ALT 24 24   ANIONGAP 14 12       Significant Imaging: I have reviewed all pertinent imaging results/findings within the past 24 hours.      Assessment/Plan:      * Acute on chronic respiratory failure with hypoxia and hypercapnia  1. Acute on chronic hypoxemic and hypercapnic respiratory failure- No PFts. Due to chronic HF/ESRD with volume overload. Reported h/o COPD. Off NIV with fluid removal with HD, continue supplemental oxygen.    2. HFpEF - volume management with HD, gdmt  3. H/o PE- continue eliquis    4. DM2 with hyperglycemia - insulin intensified 6/24, carb restricted diet enforced. Better- no changes to rx today  5. ESRD on HD MWF - per nephro  6. COPD- no PFTs/historic diagnosis- On LAMA at home. Continue bds  7. HTN - continue meds.  8. Encephalopathy- hypercapnia/metabolic. CT imaging brain without acute findings and exam non-focal.- Resolved.   9. Ascites- SAAG <1.1- "other cells" identified on differential. Not SBP.. Although majority of evidence points to chronic volume overload + hypoalbuminemia-- Follow cytology results and tumor markers.  10. Mastoiditis- incidentally noted on CT imaging brain. Continue Augmentin Rx x 7 days per ENT.  11. Anemia- Hb stable. No blood loss   12. Hyperkalemia - 6.6 this morning, taken to HD immediately for routine OP HD schedule. Repeat BMP 1500.     ESRD (end stage renal disease)  Nephrology consulted for HD.        VTE Risk Mitigation (From admission, onward)         Ordered     apixaban tablet 5 mg  2 times daily         06/21/23 0244     heparin (porcine) injection 1,000 Units  As needed (PRN)         06/21/23 0602     IP VTE HIGH RISK PATIENT  Once         06/21/23 0222     Place sequential compression device  Until discontinued         06/21/23 0222                Discharge Planning   GERA: 6/27/2023     Code Status: Full Code   Is the patient medically ready for " discharge?: No    Reason for patient still in hospital (select all that apply): Laboratory test  Discharge Plan A: Return to nursing home                  Cecy Garcia MD  Department of Hospital Medicine   Nick Menjivar - Telemetry Stepdown

## 2023-06-26 NOTE — PLAN OF CARE
Problem: Adult Inpatient Plan of Care  Goal: Absence of Hospital-Acquired Illness or Injury  Outcome: Ongoing, Progressing  Intervention: Identify and Manage Fall Risk  Flowsheets (Taken 6/26/2023 4927)  Safety Promotion/Fall Prevention:   assistive device/personal item within reach   side rails raised x 2   lighting adjusted   medications reviewed  Plan of care was reviewed with the ptSingh GARCIA. Glucose monitoring was done. Supplemental insulin was given. HD was done and they removed 3L. AV fistula site is bleeding and MD was notified. Intake and output was documented. No major changes throughout the day. Safety precautions were in placed.

## 2023-06-26 NOTE — PROGRESS NOTES
[facility] H[facility]ODIALYSIS NOTE    Cosme Lewis is a 57 y.o. male currently on hemodialysis for removal of uremic toxins and volume.     Patient seen and evaluated on hemodialysis, tolerating treatment, see HD flowsheet for vitals and assessments.    No Hypotension, chest pain, shortness of breath, cramping, nausea or vomiting.      Labs have been reviewed and the dialysate bath has been adjusted.     Labs:     Recent Labs   Lab 06/23/23  0441 06/25/23  0258 06/26/23  0658    134* 131*   K 4.6 4.8 6.6*    100 102   CO2 26 20* 17*   BUN 40* 42* 57*   CREATININE 4.2* 4.8* 5.6*   CALCIUM 7.8* 7.6* 7.7*   PHOS 4.0 3.0 3.9     Recent Labs   Lab 06/23/23  0441 06/25/23  0258 06/26/23  0513   WBC 3.25* 3.18* 3.12*   HGB 10.3* 10.0* 11.0*   HCT 32.6* 32.0* 34.1*   * 130* 151     Lab Results   Component Value Date    FESATURATED 19 (L) 02/23/2023    FERRITIN 1,803 (H) 02/23/2023        Assessment/Plan      Ultrafiltration goal: Liters: 3L. Duration: 3 hours    - Seen on dialysis today, tolerating session with current UFR, no complications.    - K 6.6, bath adjusted. Will adjust diet order.   - No lab stick/BP intake on access site  - Continue to monitor intake and output, daily weights   - Please avoid gadolinium, fleets, phos-based laxatives, NSAIDs  - Will follow closely and continue dialysis treatments while in-patient    Anemia  - Hgb 11.0  - EPO can be administered and dosed per his OP unit upon discharge.    BMM  - Renal diet with protein intake goal 1.5 g/kg/d if appropriate   - Novasource with meals   - F/U PO4, Mg, Calcium. And albumin levels.   - Phos 3.9. Please continue Sevelamer.     HTN  - BP Elevated  - Goal for BP <140mmHg SBP and <90 mmHg DBP. Maintain MAP > 65 mmHg.  - Continue home antihypertensive regimen; adjust as needed.       Rosi Grant, KUMAR, APRN, FNP-C  Nephrology Department  Pager:  [phone]

## 2023-06-26 NOTE — PT/OT/SLP PROGRESS
Occupational Therapy      Patient Name:  Cosme Lewis   MRN:  [id number]    Patient not seen today secondary to Pt attempted 2x this date. Upon AM attempt, Pt SOL for dialysis, at PM attempt, Pt found bleeding at fistula site, with OT applying pressure and notifying RN. RN at bedside upon OT departure . Will follow-up as appropriate.    6/26/2023    RUMA Orellaan/ROJAS

## 2023-06-26 NOTE — PLAN OF CARE
06/26/23 1016   Post-Acute Status   Post-Acute Authorization Placement   Post-Acute Placement Status Referrals Sent     Referral sent to [facility] via [facility]. Mr. Lewis is a resident and he is expected to return to [facility] tomorrow pending medical stability.    Abby Estrella RN  Ext [phone]

## 2023-06-26 NOTE — PROGRESS NOTES
HD TX completed.  Ran for 3 hrs.  Net fluid removed is 3 liters.   VSS.   Tolerated diialysis  well.  Report given to primary nurse.  Returning to floor via stretcher.

## 2023-06-26 NOTE — SUBJECTIVE & OBJECTIVE
Interval History: K 6.6 this morning, no symptoms - dialysis as scheduled    Review of Systems   All other systems reviewed and are negative.  Objective:     Vital Signs (Most Recent):  Temp: 97 °F (36.1 °C) (06/26/23 0925)  Pulse: 64 (06/26/23 1015)  Resp: 19 (06/26/23 0925)  BP: (!) 148/74 (06/26/23 1015)  SpO2: 96 % (06/26/23 0925) Vital Signs (24h Range):  Temp:  [96.9 °F (36.1 °C)-98.4 °F (36.9 °C)] 97 °F (36.1 °C)  Pulse:  [58-68] 64  Resp:  [17-19] 19  SpO2:  [90 %-99 %] 96 %  BP: (128-153)/(68-81) 148/74     Weight: 70.6 kg (155 lb 10.3 oz)  Body mass index is 22.98 kg/m².    Intake/Output Summary (Last 24 hours) at 6/26/2023 1106  Last data filed at 6/26/2023 0335  Gross per 24 hour   Intake 600 ml   Output --   Net 600 ml         Physical Exam  Vitals and nursing note reviewed.   Constitutional:       General: He is not in acute distress.     Appearance: He is not toxic-appearing.   HENT:      Head: Normocephalic and atraumatic.   Pulmonary:      Effort: Pulmonary effort is normal. No respiratory distress.   Musculoskeletal:      Right lower leg: No edema.      Left lower leg: No edema.   Neurological:      Mental Status: He is alert. Mental status is at baseline.   Psychiatric:         Behavior: Behavior normal.           Significant Labs: All pertinent labs within the past 24 hours have been reviewed.  CBC:   Recent Labs   Lab 06/25/23  0258 06/26/23  0513   WBC 3.18* 3.12*   HGB 10.0* 11.0*   HCT 32.0* 34.1*   * 151     CMP:   Recent Labs   Lab 06/25/23  0258 06/26/23  0658   * 131*   K 4.8 6.6*    102   CO2 20* 17*   * 205*   BUN 42* 57*   CREATININE 4.8* 5.6*   CALCIUM 7.6* 7.7*   PROT 6.0 6.6   ALBUMIN 2.9* 2.9*   BILITOT 0.5 0.5   ALKPHOS 167* 165*   AST 23 38   ALT 24 24   ANIONGAP 14 12       Significant Imaging: I have reviewed all pertinent imaging results/findings within the past 24 hours.

## 2023-06-26 NOTE — PLAN OF CARE
INFANT DISCHARGE SUMMARY :    DISPOSITION STATUS NOTE:  Date:2021 Mode: Car Seat Accompanied by:family member Disposition:home  Outcomes achieved per plan of care. Discharged in stable condition. Family aware of [place] state law requiring infants to ride in car safety seats and that there are fines for those who disregard this law.    Birth Weight: 8 lb 2.5 oz (3700 g)   Discharge Weight: Weight: Weight: 3430 g (10/02/21 0625) -7%  TCB:9.4 (High Risk) (10/01/21 0826)  Bilirubin   Bilirubin, Total (mg/dL)   Date Value   2021 11.1 (HH)     [place] Screen: Done   [place] Hearing Test Results: Done  Hearing Test Machine: Auditory Brainstem Response (Algo) (10/01/21 0853)   Hearing Test Results: Pass R, Pass L (10/01/21 0853)     Immunizations:   Most Recent Immunizations  Administered Date(s) Administered  • Hepatitis B Child   Most Recent Immunizations   Administered Date(s) Administered   • Hep B, adolescent or pediatric 2021   Pended Date(s) Pended   • Hepatitis B Immune Globulin 2021          Safe Sleep: Reduce your baby's risk of SIDS (Sudden Infant Death Syndrome) by practicing Safe Sleep during naps and at night  -Always place your baby on his/her back in a safety approved crib, bassinet, or portable play area.  DO NOT use a car seat, adult bed, swing, carrier, or similar products for everyday sleep.  -Infants should not share a bed with adults or other children during sleep, the safest place for your baby is in a separate safe sleep space close to the parents' bed.  -Place your baby firm surface covered with a fitted sheet, NEVER on a couch (alone or with a parent), pillow, quilt, or with fluffy materials.  -No pillows, stuffed animals, toys, or crib bumpers.     Car seat Safety:   -It is recommended that children under the age of two should always be in a rear-facing seat that is installed in the back seat.  -Proper fit: Harness straps should    Problem: Infection  Goal: Absence of Infection Signs and Symptoms  Outcome: Ongoing, Progressing  Intervention: Prevent or Manage Infection  Flowsheets (Taken 6/26/2023 0333)  Fever Reduction/Comfort Measures: lightweight clothing  Infection Management: aseptic technique maintained  Isolation Precautions: precautions maintained     Problem: Adult Inpatient Plan of Care  Goal: Optimal Comfort and Wellbeing  Outcome: Ongoing, Progressing  Intervention: Monitor Pain and Promote Comfort  Flowsheets (Taken 6/26/2023 0333)  Pain Management Interventions:   pillow support provided   care clustered   quiet environment facilitated     Problem: Diabetes Comorbidity  Goal: Blood Glucose Level Within Targeted Range  Outcome: Ongoing, Progressing  Intervention: Monitor and Manage Glycemia  Flowsheets (Taken 6/26/2023 0333)  Glycemic Management:   blood glucose monitored   supplemental insulin given      Patient awake during shift. No complaints of pain or discomfort.   Patient had numerous loose stools throughout shift.   Educated on the importance of monitoring blood sugar and minimizing sugar intake.       lie flat and be snug when clipped.  The chest clip should be at armpit level.  Adjust as your baby grows.    Feeding your baby:  - Feed only breast milk or formula to infant unless your doctor directs otherwise (No water, cereal, honey etc).  -Your baby should be fed on cue (wakes up, sucking on hands, turning head with mouth open, and/or then cries).  Your baby will eat every 2-3 hours day and night, or 8-12 times in 24 hours.  Your baby will let you know he/she is full when they detach off nipple (mother's or a bottle), suck less vigorously, or becomes sleepy and relaxed.  -Keep a record of your baby's feedings and diapers just like you did in the hospital until the baby is seen by the pediatrician.    For breastfeeding tips please refer to pg. 32 of the breastfeeding section in your A New Beginning booklet given to you by your caregivers.    Tummy time:  Allow your alert and awake baby to have 30-60 minutes of supervised tummy time each day.    Bathing your Baby:  Babies have sensitive skin that can dry out easily.  Wipe your baby's face with just warm water.  Wash his/her body with a soapy wash cloth until umbilical cord falls off and/or circumcision heals (about 1 week of age) then you can bathe the baby in a tub.    Umbilical Cord:  Keep umbilical cord dry.  Do not apply any medications or alcohol to site.  The cord will fall off in about 1 week.    Circumcision Care:  Wash penis with only warm water.  Avoid wiping with baby wipes as they could sting.  Apply a quarter size of petroleum jelly in your baby's diaper with every diaper change to prevent the sore area of the penis from sticking to the diaper.    Jaundice:    Yellowing of the skin or \"jaundice\" is common in newborns.  The yellow appearance is most noticeable in the eyes and skin and is caused by bilirubin.  Jaundice is normal but can become dangerous if the level of bilirubin is too high.  Your baby's doctor will monitor your infant's bilirubin level  and treat it as necessary.  You may have to bring your infant to the pediatrician for a blood test if bilirubin was high in the hospital.  Call your pediatrician if your baby's skin or eyes become yellow or your baby becomes lethargic (too sleepy).     Safety at Home:    Accidental infant fall can happen; the key to avoid a fall is prevention.  Remember as you are recovering the medications you may be taking may make you more tired.  Keep this in mind when holding your baby.  If you are feeling sleepy or plan on sleeping place your baby in a safe place such as their crib or bassinet.   Babies wiggle, move, and push against things with their feet.  Even these early movements can result in a fall.  Do not leave your baby alone on a changing table, bed, sofa, or chair.  If you cannot hold your baby put them in a safe place such as their crib or playpen.  If you have other children, please be cautious and assist your older children while they are holding baby.  If your child has a serious fall or does not act normally after a fall, call your doctor.    Follow-Up Care:   Please follow-up as scheduled.  Call your doctor or lactation consultant with any questions or concerns.    DO NOT GIVE BABY ANY MEDICATIONS unless directed by your doctor.    Notify your doctor if:  · Fever of 100.0 degrees F or higher axially (under the armpit)  · Constant crying for no apparent reason  · Forceful vomiting (not spitting up)  · Several feedings when infant does not suck  · Redness, bleeding or foul-smelling drainage from around the umbilical cord  · Any unusual rash  · Yellow color of eyes or skin  · Watery runny stools (mucous or blood or foul odor)  · No stool for 48 hours  · Has less than 1 wet diaper per day of life (for example you should see 2 wet diapers on day 2, 3 wet diapers on day 3, etc.)  Babies should have at least 6 wet diapers a day after the baby is 7 days old  · Infant injury (fall from bed or table, dropped or  severely shaken)        Special Instructions: In case you need to call about any of the above:  Take baby's temperature and write it down  Know how much and how many feedings the infant had that day  Note amount, color, consistency of urine and stools  Note any changes in the infant's behavior such as being sleepy, very fussy or less active.

## 2023-06-26 NOTE — ASSESSMENT & PLAN NOTE
"1. Acute on chronic hypoxemic and hypercapnic respiratory failure- No PFts. Due to chronic HF/ESRD with volume overload. Reported h/o COPD. Off NIV with fluid removal with HD, continue supplemental oxygen.    2. HFpEF - volume management with HD, gdmt  3. H/o PE- continue eliquis    4. DM2 with hyperglycemia - insulin intensified 6/24, carb restricted diet enforced. Better- no changes to rx today  5. ESRD on HD MWF - per nephro  6. COPD- no PFTs/historic diagnosis- On LAMA at home. Continue bds  7. HTN - continue meds.  8. Encephalopathy- hypercapnia/metabolic. CT imaging brain without acute findings and exam non-focal.- Resolved.   9. Ascites- SAAG <1.1- "other cells" identified on differential. Not SBP.. Although majority of evidence points to chronic volume overload + hypoalbuminemia-- Follow cytology results and tumor markers.  10. Mastoiditis- incidentally noted on CT imaging brain. Continue Augmentin Rx x 7 days per ENT.  11. Anemia- Hb stable. No blood loss   12. Hyperkalemia - 6.6 this morning, taken to HD immediately for routine OP HD schedule. Repeat BMP 1500.   "

## 2023-06-27 PROBLEM — T82.590A DIALYSIS AV FISTULA MALFUNCTION, INITIAL ENCOUNTER: Status: ACTIVE | Noted: 2023-01-01

## 2023-06-27 NOTE — SUBJECTIVE & OBJECTIVE
Medications Prior to Admission   Medication Sig Dispense Refill Last Dose    acetaminophen (TYLENOL) 650 MG TbSR Take 650 mg by mouth every 8 (eight) hours as needed (pain or fever over 100.4).       albuterol (PROVENTIL/VENTOLIN HFA) 90 mcg/actuation inhaler Inhale 2 puffs into the lungs 4 (four) times daily as needed (breathing).       albuterol-ipratropium (DUO-NEB) 2.5 mg-0.5 mg/3 mL nebulizer solution Take 3 mLs by nebulization every 4 (four) hours while awake. Rescue 6480 mL 0     apixaban (ELIQUIS) 5 mg Tab Take 5 mg by mouth 2 (two) times daily.       artificial tears (ISOPTO TEARS) 0.5 % ophthalmic solution Place 1 drop into both eyes 6 (six) times daily. 15 mL 0     aspirin (ECOTRIN) 81 MG EC tablet Take 81 mg by mouth once daily.       atorvastatin (LIPITOR) 40 MG tablet Take 1 tablet (40 mg total) by mouth once daily. (Patient taking differently: Take 40 mg by mouth every evening.) 90 tablet 3     calcium acetate,phosphat bind, (PHOSLO) 667 mg capsule Take 667 mg by mouth 3 (three) times daily.       carvediloL (COREG) 6.25 MG tablet Take 6.25 mg by mouth 2 (two) times daily.       cetirizine (ZYRTEC) 10 MG tablet Take 10 mg by mouth daily as needed (itching).       cloNIDine (CATAPRES) 0.1 MG tablet Take 0.1 mg by mouth.       cloNIDine 0.3 mg/24 hr td ptwk (CATAPRES) 0.3 mg/24 hr APPLY ONE PATCH TOPICALLY EVERY WEEK FOR HIGH BLOOD PRESSURE       doxycycline (VIBRAMYCIN) 100 MG Cap Take 100 mg by mouth 2 (two) times daily.       epoetin xavier (PROCRIT) 10,000 unit/mL injection Inject 0.7 mLs (7,000 Units total) into the skin every Tues, Thurs, Sat.       erythromycin (ROMYCIN) ophthalmic ointment Place a 1/2 inch ribbon of ointment into the lower eyelid three times a day. (Patient taking differently: Place a 1/2 inch ribbon of ointment into the lower right eyelid three times a day.) 3.5 g 0     FLUoxetine 40 MG capsule Take 40 mg by mouth once daily.       fluticasone-salmeterol diskus inhaler 250-50 mcg  Inhale 1 puff into the lungs 2 (two) times daily.       GLUCAGON EMERGENCY KIT, HUMAN, 1 mg injection 1 mg into the muscle as needed if CBG < 70       HYDROPHILIC CREAM TOP Apply liberal amount to affected area twice daily for dry skin       insulin detemir U-100, Levemir, 100 unit/mL (3 mL) SubQ InPn pen Inject 8 Units into the skin once daily. 7.2 mL 3     insulin detemir U-100, Levemir, 100 unit/mL (3 mL) SubQ InPn pen Inject 4 Units into the skin every evening. 3.6 mL 3     insulin lispro (HUMALOG KWIKPEN INSULIN) 100 unit/mL pen Inject 8 Units into the skin 3 (three) times daily before meals. 6 mL 11     isosorbide mononitrate (IMDUR) 30 MG 24 hr tablet Take 1 tablet (30 mg total) by mouth once daily. 90 tablet 3     lisinopriL (PRINIVIL,ZESTRIL) 40 MG tablet Take 1 tablet (40 mg total) by mouth every evening. 90 tablet 3     melatonin 3 mg TbDL Take 9 mg by mouth nightly as needed (sleep).       NIFEdipine (PROCARDIA-XL) 60 MG (OSM) 24 hr tablet Take 1 tablet (60 mg total) by mouth 2 (two) times a day. 60 tablet 11     nut.tx.imp.renal fxn,lac-reduc (NEPRO CARB STEADY ORAL) Give 1 carton by mouth with each meal.       ondansetron (ZOFRAN) 8 MG tablet Take 1 tablet by mouth 3 (three) times daily as needed for Nausea.       sevelamer carbonate (RENVELA) 800 mg Tab Take 800 mg by mouth 3 (three) times daily.       sodium chloride (SALINE NASAL) 0.65 % nasal spray 1 spray by Nasal route as needed (dry nostril). 30 mL 12     tiotropium bromide (SPIRIVA RESPIMAT) 2.5 mcg/actuation inhaler Inhale 2 puffs into the lungs Daily.       triamcinolone acetonide 0.025% (KENALOG) 0.025 % cream Apply topically 2 (two) times daily.       vitamin renal formula, B-complex-vitamin c-folic acid, (NEPHROCAP) 1 mg Cap Take 1 capsule by mouth once daily.       vits A and D-white pet-lanolin ointment Apply topically 2 (two) times daily. Apply twice daily to penis 15 g 0     white petrolatum (VASELINE) ointment Apply topically once  daily. Apply small amount to both nostrils daily 5 g 0        Review of patient's allergies indicates:  No Known Allergies    Past Medical History:   Diagnosis Date    Chronic kidney disease-mineral and bone disorder 10/12/2022    COPD (chronic obstructive pulmonary disease)     Diabetes mellitus type 1     ESRD on dialysis     Hypertension     Hypervolemia 2/22/2023    Hyponatremia 3/4/2023    SOB (shortness of breath) 10/12/2022    Patient with history COPD treated with Ellipta the albuterol, fluticasone-salmeterol tachypneic in the ED saturating 94% and 6 L on nasal cannula, patient does not know how many  he uses it is in nursing home.    -duo nebs Q 4  Given that patient is a poor historian, and in the setting of left lower extremity edema and history of coagulopathy PE cannot be ruled out.  Will workup for possible infec     Past Surgical History:   Procedure Laterality Date    AV FISTULA PLACEMENT       Family History       Problem Relation (Age of Onset)    Diabetes Mother          Tobacco Use    Smoking status: Never    Smokeless tobacco: Never   Substance and Sexual Activity    Alcohol use: Not Currently    Drug use: Not on file    Sexual activity: Not Currently     Review of Systems   Constitutional:  Negative for chills and fever.   Eyes:  Negative for visual disturbance.   Respiratory:  Negative for shortness of breath.    Cardiovascular:  Negative for chest pain.        Bleeding from LUE AV graft   Gastrointestinal:  Negative for abdominal pain, blood in stool, constipation, diarrhea, nausea and vomiting.   Genitourinary:  Negative for dysuria and hematuria.   Musculoskeletal:  Negative for back pain.   Allergic/Immunologic: Negative for environmental allergies.   Neurological:  Negative for dizziness, weakness and light-headedness.   Objective:     Vital Signs (Most Recent):  Temp: 97.4 °F (36.3 °C) (06/27/23 1602)  Pulse: 63 (06/27/23 1602)  Resp: 16 (06/27/23 1602)  BP: (!) 146/78 (06/27/23  1602)  SpO2: 95 % (06/27/23 1602) Vital Signs (24h Range):  Temp:  [97.4 °F (36.3 °C)-98.4 °F (36.9 °C)] 97.4 °F (36.3 °C)  Pulse:  [63-81] 63  Resp:  [16-20] 16  SpO2:  [90 %-99 %] 95 %  BP: (128-149)/(73-83) 146/78     Weight: 69 kg (152 lb 1.9 oz)  Body mass index is 22.46 kg/m².      Physical Exam  Constitutional:       General: He is not in acute distress.     Appearance: Normal appearance. He is not ill-appearing.   Cardiovascular:      Rate and Rhythm: Normal rate and regular rhythm.      Comments: LUE AVG graft with palpable thrill  No skin breakdown, prior access sites from IV notable  2+ radial pulse, faintly palpable ulnar pulse  Pulmonary:      Effort: Pulmonary effort is normal. No respiratory distress.      Breath sounds: Normal breath sounds.   Abdominal:      General: Abdomen is flat.      Palpations: Abdomen is soft.   Musculoskeletal:         General: Normal range of motion.   Skin:     General: Skin is warm and dry.   Neurological:      General: No focal deficit present.      Mental Status: He is alert and oriented to person, place, and time. Mental status is at baseline.        Significant Labs:  CBC:   Recent Labs   Lab 06/27/23  0457 06/27/23  1443   WBC 2.87*  --    RBC 3.45*  --    HGB 10.1* 10.9*   HCT 32.6* 34.5*     --    MCV 95  --    MCH 29.3  --    MCHC 31.0*  --      CMP:   Recent Labs   Lab 06/27/23  0457   GLU 73   CALCIUM 7.6*   ALBUMIN 2.8*   PROT 5.9*   *   K 5.0   CO2 24      BUN 39*   CREATININE 4.0*   ALKPHOS 161*   ALT 19   AST 26   BILITOT 0.5       Significant Diagnostics:  I have reviewed all pertinent imaging results/findings within the past 24 hours.

## 2023-06-27 NOTE — PROGRESS NOTES
Nick Menjivar - Telemetry [facility] Medicine  Progress Note    Patient Name: Cosme Lewis  MRN: [id number]  Patient Class: IP- Inpatient   Admission Date: 6/20/2023  Length of Stay: 6 days  Attending Physician: Cecy Garcia MD  Primary Care Provider: Primary Doctor No        Subjective:     Principal Problem:Acute on chronic respiratory failure with hypoxia and hypercapnia        HPI:  Copied from MICU team who admitted the patient:    Mr. Cosme Lewis is a 58 y/o M with hx of HFpEF (LVEF 55%, Grade II DD on echo 3/20/23), chronic RF on home O2, IDDM2, ESRD on HD, COPD, HTN, muliple PEs on eliquis who presented to the ED from [facility] for evaluation of AMS and hypoxia. He was in his usual state of health until yesterday afternoon when staff noticed he was more lethargic than normal, and was satting in the 80s.      In the ED, patient afebrile, hypertensive to 170s systolic, HR 50s, hypoxic requiring 10L NRB. CBC grossly unchanged from BL with chronic leukopenia and anemia. CMP also grossly unchanged from BL, with known mild hyponatremia. Lactic elevated to 3.0. BNP 3200. Trops wnl. Procal and CRP elevated. CXR demonstrating bilateral edema as well as L pleural effusion. CTH showing L mastoiditis, otherwise no acute intracranial abnormality. Initial VBG demonstrating pH 7.30/PCO2 68. Patient given 80 lasix IV, duo-nebs, ceftriaxone, and solumedrol. repeat VBG with pH down to 7.27, CO2 76. Patient placed on bipap and MICU consulted for management of severe respiratory acidosis s/o HFE and COPD.            Overview/Hospital Course:  Admitted to Critical Care Medicine for acute respiratory failure requiring continuous BiPap use. Nephrology consult initiated dialysis for volume removal. BiPap weaned to nasal cannula. Started on Ceftriaxone/Metronidazole to address mastoiditis/pneumonia concerns and transitioned to Augmentin(ESRD dosage) to complete 7 day course. Underwent paracentesis for ascites. SAAG <1.1, unclear  etiology. Cytology pending and malignancy markers ordered for further evaluation.  Resumed all home medications and administered IV hydralazine x1 for elevated BP, Insulin infusion started for hyperglycemia with plans to transition to scheduled dosing when appropriate.   Stepped down to  6/23/23.        Interval History: bleeding from avf after HD last night, compression dressings applied - soaked with blood today.     Review of Systems   All other systems reviewed and are negative.  Objective:     Vital Signs (Most Recent):  Temp: 97.5 °F (36.4 °C) (06/27/23 1119)  Pulse: 67 (06/27/23 1119)  Resp: 16 (06/27/23 1119)  BP: (!) 140/83 (06/27/23 1119)  SpO2: (!) 93 % (06/27/23 1119) Vital Signs (24h Range):  Temp:  [97.5 °F (36.4 °C)-98.4 °F (36.9 °C)] 97.5 °F (36.4 °C)  Pulse:  [65-81] 67  Resp:  [16-20] 16  SpO2:  [90 %-99 %] 93 %  BP: (128-153)/(73-83) 140/83     Weight: 69 kg (152 lb 1.9 oz)  Body mass index is 22.46 kg/m².    Intake/Output Summary (Last 24 hours) at 6/27/2023 1425  Last data filed at 6/27/2023 0349  Gross per 24 hour   Intake 951 ml   Output --   Net 951 ml         Physical Exam  Vitals and nursing note reviewed.   Constitutional:       General: He is not in acute distress.     Appearance: He is not toxic-appearing.   HENT:      Head: Normocephalic and atraumatic.   Cardiovascular:      Comments: LUE avf-g - overlying dressings are soaked with blood  Pulmonary:      Effort: Pulmonary effort is normal. No respiratory distress.   Skin:     General: Skin is warm.   Neurological:      Mental Status: He is alert. Mental status is at baseline.           Significant Labs: All pertinent labs within the past 24 hours have been reviewed.  CBC:   Recent Labs   Lab 06/26/23  0513 06/27/23  0457   WBC 3.12* 2.87*   HGB 11.0* 10.1*   HCT 34.1* 32.6*    159     CMP:   Recent Labs   Lab 06/26/23  0658 06/26/23  1459 06/27/23  0457   * 135* 134*   K 6.6* 4.4 5.0    98 100   CO2 17* 25 24   GLU  "205* 178* 73   BUN 57* 30* 39*   CREATININE 5.6* 3.6* 4.0*   CALCIUM 7.7* 7.8* 7.6*   PROT 6.6  --  5.9*   ALBUMIN 2.9*  --  2.8*   BILITOT 0.5  --  0.5   ALKPHOS 165*  --  161*   AST 38  --  26   ALT 24  --  19   ANIONGAP 12 12 10       Significant Imaging: I have reviewed all pertinent imaging results/findings within the past 24 hours.      Assessment/Plan:      * Acute on chronic respiratory failure with hypoxia and hypercapnia  1. Acute on chronic hypoxemic and hypercapnic respiratory failure- No PFts. Due to chronic HF/ESRD with volume overload. Reported h/o COPD. Off NIV with fluid removal with HD, continue supplemental oxygen.    2. HFpEF - volume management with HD, gdmt  3. H/o PE- continue eliquis  - Eliquis held today due to bleeding avf  4. DM2 with hyperglycemia - insulin intensified 6/24, carb restricted diet enforced. Better- no changes to rx today  5. ESRD on HD MWF - per nephro, had HD 6/26, now having avf hemorrhage - Vascular sx consult  6. COPD- no PFTs/historic diagnosis- On LAMA at home. Continue bds  7. HTN - continue meds.  8. Encephalopathy- hypercapnia/metabolic. CT imaging brain without acute findings and exam non-focal.- Resolved.   9. Ascites- SAAG <1.1- "other cells" identified on differential. Not SBP.. Although majority of evidence points to chronic volume overload + hypoalbuminemia-- Follow cytology results and tumor markers - no malignancy on cytology, non-specific tumor marker elevation. Will not pursue additional testing at this time.  10. Mastoiditis- incidentally noted on CT imaging brain. Continue Augmentin Rx x 7 days per ENT.  11. Anemia- Hb stable. No blood loss   12. Hyperkalemia - resolved with HD, K restricted diet and daily bmp monitoring      Dialysis AV fistula malfunction, initial encounter  Bleeding from avf since HD last night - compression dressings applied which is currently soaked with blood  -Vascular sx consult  -check coags and h/h now  -hold aspirin and " Eliquis  -hold discharge      ESRD (end stage renal disease)  Nephrology consulted for HD.        VTE Risk Mitigation (From admission, onward)         Ordered     heparin (porcine) injection 1,000 Units  As needed (PRN)         06/21/23 0602     IP VTE HIGH RISK PATIENT  Once         06/21/23 0222     Place sequential compression device  Until discontinued         06/21/23 0222                Discharge Planning   GERA: 6/28/2023     Code Status: Full Code   Is the patient medically ready for discharge?: No    Reason for patient still in hospital (select all that apply): Patient new problem and Laboratory test  Discharge Plan A: Return to nursing home                  Cecy Garcia MD  Department of Hospital Medicine   [facility] - Telemetry Stepdown

## 2023-06-27 NOTE — SUBJECTIVE & OBJECTIVE
Interval History: bleeding from avf after HD last night, compression dressings applied - soaked with blood today.     Review of Systems   All other systems reviewed and are negative.  Objective:     Vital Signs (Most Recent):  Temp: 97.5 °F (36.4 °C) (06/27/23 1119)  Pulse: 67 (06/27/23 1119)  Resp: 16 (06/27/23 1119)  BP: (!) 140/83 (06/27/23 1119)  SpO2: (!) 93 % (06/27/23 1119) Vital Signs (24h Range):  Temp:  [97.5 °F (36.4 °C)-98.4 °F (36.9 °C)] 97.5 °F (36.4 °C)  Pulse:  [65-81] 67  Resp:  [16-20] 16  SpO2:  [90 %-99 %] 93 %  BP: (128-153)/(73-83) 140/83     Weight: 69 kg (152 lb 1.9 oz)  Body mass index is 22.46 kg/m².    Intake/Output Summary (Last 24 hours) at 6/27/2023 1425  Last data filed at 6/27/2023 0349  Gross per 24 hour   Intake 951 ml   Output --   Net 951 ml         Physical Exam  Vitals and nursing note reviewed.   Constitutional:       General: He is not in acute distress.     Appearance: He is not toxic-appearing.   HENT:      Head: Normocephalic and atraumatic.   Cardiovascular:      Comments: LUE avf-g - overlying dressings are soaked with blood  Pulmonary:      Effort: Pulmonary effort is normal. No respiratory distress.   Skin:     General: Skin is warm.   Neurological:      Mental Status: He is alert. Mental status is at baseline.           Significant Labs: All pertinent labs within the past 24 hours have been reviewed.  CBC:   Recent Labs   Lab 06/26/23  0513 06/27/23  0457   WBC 3.12* 2.87*   HGB 11.0* 10.1*   HCT 34.1* 32.6*    159     CMP:   Recent Labs   Lab 06/26/23  0658 06/26/23  1459 06/27/23  0457   * 135* 134*   K 6.6* 4.4 5.0    98 100   CO2 17* 25 24   * 178* 73   BUN 57* 30* 39*   CREATININE 5.6* 3.6* 4.0*   CALCIUM 7.7* 7.8* 7.6*   PROT 6.6  --  5.9*   ALBUMIN 2.9*  --  2.8*   BILITOT 0.5  --  0.5   ALKPHOS 165*  --  161*   AST 38  --  26   ALT 24  --  19   ANIONGAP 12 12 10       Significant Imaging: I have reviewed all pertinent imaging  results/findings within the past 24 hours.

## 2023-06-27 NOTE — ASSESSMENT & PLAN NOTE
Cosme Lewis is a 56 yo M with ESRD on HD MWF with LUE AV graft, HFpEF (LVEF 55%, Grade II DD on echo 3/20/23), chronic RF on home O2, IDDM2, ESRD on HD, COPD, HTN, muliple PEs on Eliquis now with bleeding from LUE AVG after HD 6/26. May have outflow stenosis of AVG, will obtain VAS HD U/S to evaluate. Currently not bleeding, stable.    - VAS HD U/S pending  - Recommend pressure dressing  - Agree with holding ASA and Eliquis  - OK for DVT ppx  - Please call vascular surgery for any further bleeding, questions, or concerns

## 2023-06-27 NOTE — PLAN OF CARE
Nick Menjivar - Telemetry Stepdown  Discharge Reassessment    Primary Care Provider: Primary Doctor No    Expected Discharge Date: 6/28/2023    Reassessment (most recent)       Discharge Reassessment - 06/27/23 1511          Discharge Reassessment    Assessment Type Discharge Planning Reassessment     Did the patient's condition or plan change since previous assessment? No     Discharge Plan A Return to nursing home   [facility] (resident)    Discharge Plan B Return to Nursing Home     DME Needed Upon Discharge  none     Transition of Care Barriers None     Why the patient remains in the hospital Requires continued medical care        Post-Acute Status    Post-Acute Authorization Placement     Post-Acute Placement Status Pending medical clearance/testing                   Abby Estrella RN  Ext [phone]

## 2023-06-27 NOTE — PLAN OF CARE
Problem: Infection  Goal: Absence of Infection Signs and Symptoms  Outcome: Ongoing, Progressing     Problem: Diabetes Comorbidity  Goal: Blood Glucose Level Within Targeted Range  Outcome: Ongoing, Not Progressing

## 2023-06-27 NOTE — PLAN OF CARE
Plan of care reviewed with pt. Pt aaox4. Fistual bleeding, vascular consulted. Pts nose is bleeding also, Dr. Garcia notified. Hemoglobin stable. Pt still on 3 L O2. Pt scheduoled for HD tomorrow.  Pt remained free of falls, trauma, and injury. Will continue to monitor.

## 2023-06-27 NOTE — ASSESSMENT & PLAN NOTE
Bleeding from avf since HD last night - compression dressings applied which is currently soaked with blood  -Vascular sx consult  -check coags and h/h now  -hold aspirin and Eliquis  -hold discharge

## 2023-06-27 NOTE — ASSESSMENT & PLAN NOTE
"1. Acute on chronic hypoxemic and hypercapnic respiratory failure- No PFts. Due to chronic HF/ESRD with volume overload. Reported h/o COPD. Off NIV with fluid removal with HD, continue supplemental oxygen.    2. HFpEF - volume management with HD, gdmt  3. H/o PE- continue eliquis  - Eliquis held today due to bleeding avf  4. DM2 with hyperglycemia - insulin intensified 6/24, carb restricted diet enforced. Better- no changes to rx today  5. ESRD on HD MWF - per nephro, had HD 6/26, now having avf hemorrhage - Vascular sx consult  6. COPD- no PFTs/historic diagnosis- On LAMA at home. Continue bds  7. HTN - continue meds.  8. Encephalopathy- hypercapnia/metabolic. CT imaging brain without acute findings and exam non-focal.- Resolved.   9. Ascites- SAAG <1.1- "other cells" identified on differential. Not SBP.. Although majority of evidence points to chronic volume overload + hypoalbuminemia-- Follow cytology results and tumor markers - no malignancy on cytology, non-specific tumor marker elevation. Will not pursue additional testing at this time.  10. Mastoiditis- incidentally noted on CT imaging brain. Continue Augmentin Rx x 7 days per ENT.  11. Anemia- Hb stable. No blood loss   12. Hyperkalemia - resolved with HD, K restricted diet and daily bmp monitoring    "

## 2023-06-27 NOTE — HPI
Cosme Lewis is our 56 yo M with ESRD (MWF HD via LUE AV Graft), HFpEF (LVEF 55%, Grade II DD on echo 3/20/23), chronic RF on home O2, IDDM2, ESRD on HD, COPD, HTN, muliple PEs on Eliquis who is currently admitted to MICU for acute on chronic respiratory failure. He reports his AVG began bleeding after HD yesterday, unable to tell when it stopped bleeding. He denies pulsatile bleeding. Bleeding is currently controlled with pressure dressing. His Hgb is 10.9, stable from 11 prior to bleed.

## 2023-06-27 NOTE — PLAN OF CARE
Problem: Adult Inpatient Plan of Care  Goal: Readiness for Transition of Care  Outcome: Ongoing, Progressing     Problem: Diabetes Comorbidity  Goal: Blood Glucose Level Within Targeted Range  Outcome: Ongoing, Progressing  Intervention: Monitor and Manage Glycemia  Flowsheets (Taken 6/27/2023 0437)  Glycemic Management: blood glucose monitored     Problem: Fall Injury Risk  Goal: Absence of Fall and Fall-Related Injury  Outcome: Ongoing, Progressing  Intervention: Identify and Manage Contributors  Flowsheets (Taken 6/27/2023 0437)  Self-Care Promotion: independence encouraged  Medication Review/Management: medications reviewed    Patient rested throughout shift without pain or discomfort.   Encouraged frequent movement in bed to prevent pressure injury.   Educated patient on monitoring of blood sugar.

## 2023-06-28 PROBLEM — N18.6 END-STAGE RENAL DISEASE ON HEMODIALYSIS: Status: ACTIVE | Noted: 2023-01-01

## 2023-06-28 PROBLEM — Z99.2 END-STAGE RENAL DISEASE ON HEMODIALYSIS: Status: ACTIVE | Noted: 2023-01-01

## 2023-06-28 NOTE — PROGRESS NOTES
Nick Menjivar - Telemetry Stepdown  Adult Nutrition  Progress Note    SUMMARY       Recommendations    1. Continue Diabetic diet, add Renal diet restrictions - change ONS to [facility].   2. RD to monitor & follow-up.    Goals: Meet % EEN, EPN by RD f/u date  Nutrition Goal Status: goal met  Communication of RD Recs: other (comment) (POC)    Assessment and Plan    Moderate malnutrition    Nutrition Problem:  Moderate Protein-Calorie Malnutrition  Malnutrition in the context of Chronic Illness/Injury    Related to (etiology):  Inability to consume sufficient energy     Signs and Symptoms (as evidenced by):  Body Fat Depletion: mild and moderate depletion of orbitals   Muscle Mass Depletion: mild and moderate depletion of temples, clavicle region and lower extremities   Weight Loss: 10% x 6 months    Interventions(treatment strategy):  Collaboration of nutrition care w/ other providers  ONS    Nutrition Diagnosis Status:  Continues     Malnutrition Assessment    Malnutrition Context: chronic illness  Malnutrition Level: moderate    Weight Loss (Malnutrition): 10% in 6 months  Subcutaneous Fat (Malnutrition): moderate depletion  Muscle Mass (Malnutrition): moderate depletion     Reason for Assessment    Reason For Assessment: RD follow-up  Diagnosis: other (see comments) (Resp. fx)  Relevant Medical History: HF, ESRD on HD, DM  Interdisciplinary Rounds: did not attend    General Information Comments: Pt tolerating diet, consuming 100% of meals. Pt received HD yesterday. UBW: 160# per chart review. NFPE complete 6/21. RD feels pt meets criteria for moderate malnutrition. Please see PES statement for details. Attempted to reinforce diabetic diet education w/ pt - seemed uninterested.   Nutrition Discharge Planning: Pending clinical course    Nutrition/Diet History    Spiritual, Cultural Beliefs, Evangelical Practices, Values that Affect Care: no  Factors Affecting Nutritional Intake: None identified at this  "time    Anthropometrics    Temp: 96 °F (35.6 °C)  Height: 5' 9" (175.3 cm)  Height (inches): 69 in  Weight Method: Standard Scale  Weight: 69 kg (152 lb 1.9 oz)  Weight (lb): 152.12 lb  Ideal Body Weight (IBW), Male: 160 lb  % Ideal Body Weight, Male (lb): 95.08 %  BMI (Calculated): 22.5  BMI Grade: 18.5-24.9 - normal  Usual Body Weight (UBW), k.7 kg  % Usual Body Weight: 90.01  % Weight Change From Usual Weight: -10.18 %    Lab/Procedures/Meds    Pertinent Labs Reviewed: reviewed  Pertinent Labs Comments: Creat 3, GFR 23.2, K 4.2, Na 133, A1C 10.7  Pertinent Medications Reviewed: reviewed    Estimated/Assessed Needs    Weight Used For Calorie Calculations: 69 kg (152 lb 1.9 oz)    Energy Calorie Requirements (kcal): 1869 kcal/d  Energy Need Method: [place]-St Jeor (1.25 PAL)    Protein Requirements: 83 g/d (1.2 g/kg)  Weight Used For Protein Calculations: 69 kg (152 lb 1.9 oz)    Estimated Fluid Requirement Method: other (see comments) (Per MD or 1 mL/kcal)  RDA Method (mL): 1869    CHO Requirement: 234g    Nutrition Prescription Ordered    Current Diet Order: 2000 kcal ADA, Low K  Oral Nutrition Supplement: Boost Glucose Control    Evaluation of Received Nutrient/Fluid Intake    I/O: -9.7L since admit    Comments: LBM:     Tolerance: tolerating    Nutrition Risk    Level of Risk/Frequency of Follow-up:  (1x/week)     Monitor and Evaluation    Food and Nutrient Intake: food and beverage intake, energy intake  Food and Nutrient Adminstration: diet order, enteral and parenteral nutrition administration  Physical Activity and Function: nutrition-related ADLs and IADLs  Anthropometric Measurements: weight, weight change  Biochemical Data, Medical Tests and Procedures: glucose/endocrine profile, inflammatory profile, lipid profile, gastrointestinal profile, electrolyte and renal panel  Nutrition-Focused Physical Findings: overall appearance     Nutrition Follow-Up    RD Follow-up?: Yes      "

## 2023-06-28 NOTE — ASSESSMENT & PLAN NOTE
Outpatient HD Information:    -Outpatient HD unit: [facility]   -HD tx days: MWF   -Last HD tx prior to presentation: 6/16/23  -HD access: LUE AVF   -HD modality: iHD   -Residual urine: Anuric       Plan/Recommendations:  -No further HD requirements at this time.   -Strict I/O's and daily weights  -Daily renal function panels   -Renally dose meds

## 2023-06-28 NOTE — NURSING
HD tx completed  Net UF: 3L  Total hold time: 20 minutes until hemostasis achieved.  Dressing dry and intact; no oozing or bleeding noted

## 2023-06-28 NOTE — ASSESSMENT & PLAN NOTE
Carvedilol 6.25 mg BID  IMDUR 30 mg QD  Lisinopril 40 mg QHS  Nifedipine 60 mg BID    Improved. UF with HD.

## 2023-06-28 NOTE — CONSULTS
[facility] - Telemetry Stepdown  Vascular Surgery  Consult Note    Inpatient consult to Vascular Surgery  Consult performed by: Elisha Duncan MD  Consult ordered by: Cecy Garcia MD  Reason for consult: bleeding LUE AVG after HD        Subjective:     Chief Complaint/Reason for Admission: bleeding AVG after HD    History of Present Illness: Cosme Lewis is our 58 yo M with ESRD (MWF HD via LUE AV Graft), HFpEF (LVEF 55%, Grade II DD on echo 3/20/23), chronic RF on home O2, IDDM2, ESRD on HD, COPD, HTN, muliple PEs on Eliquis who is currently admitted to MICU for acute on chronic respiratory failure. He reports his AVG began bleeding after HD yesterday, unable to tell when it stopped bleeding. He denies pulsatile bleeding. Bleeding is currently controlled with pressure dressing. His Hgb is 10.9, stable from 11 prior to bleed.      Medications Prior to Admission   Medication Sig Dispense Refill Last Dose    acetaminophen (TYLENOL) 650 MG TbSR Take 650 mg by mouth every 8 (eight) hours as needed (pain or fever over 100.4).       albuterol (PROVENTIL/VENTOLIN HFA) 90 mcg/actuation inhaler Inhale 2 puffs into the lungs 4 (four) times daily as needed (breathing).       albuterol-ipratropium (DUO-NEB) 2.5 mg-0.5 mg/3 mL nebulizer solution Take 3 mLs by nebulization every 4 (four) hours while awake. Rescue 6480 mL 0     apixaban (ELIQUIS) 5 mg Tab Take 5 mg by mouth 2 (two) times daily.       artificial tears (ISOPTO TEARS) 0.5 % ophthalmic solution Place 1 drop into both eyes 6 (six) times daily. 15 mL 0     aspirin (ECOTRIN) 81 MG EC tablet Take 81 mg by mouth once daily.       atorvastatin (LIPITOR) 40 MG tablet Take 1 tablet (40 mg total) by mouth once daily. (Patient taking differently: Take 40 mg by mouth every evening.) 90 tablet 3     calcium acetate,phosphat bind, (PHOSLO) 667 mg capsule Take 667 mg by mouth 3 (three) times daily.       carvediloL (COREG) 6.25 MG tablet Take 6.25 mg by mouth 2 (two) times  daily.       cetirizine (ZYRTEC) 10 MG tablet Take 10 mg by mouth daily as needed (itching).       cloNIDine (CATAPRES) 0.1 MG tablet Take 0.1 mg by mouth.       cloNIDine 0.3 mg/24 hr td ptwk (CATAPRES) 0.3 mg/24 hr APPLY ONE PATCH TOPICALLY EVERY WEEK FOR HIGH BLOOD PRESSURE       doxycycline (VIBRAMYCIN) 100 MG Cap Take 100 mg by mouth 2 (two) times daily.       epoetin xavier (PROCRIT) 10,000 unit/mL injection Inject 0.7 mLs (7,000 Units total) into the skin every Tues, Thurs, Sat.       erythromycin (ROMYCIN) ophthalmic ointment Place a 1/2 inch ribbon of ointment into the lower eyelid three times a day. (Patient taking differently: Place a 1/2 inch ribbon of ointment into the lower right eyelid three times a day.) 3.5 g 0     FLUoxetine 40 MG capsule Take 40 mg by mouth once daily.       fluticasone-salmeterol diskus inhaler 250-50 mcg Inhale 1 puff into the lungs 2 (two) times daily.       GLUCAGON EMERGENCY KIT, HUMAN, 1 mg injection 1 mg into the muscle as needed if CBG < 70       HYDROPHILIC CREAM TOP Apply liberal amount to affected area twice daily for dry skin       insulin detemir U-100, Levemir, 100 unit/mL (3 mL) SubQ InPn pen Inject 8 Units into the skin once daily. 7.2 mL 3     insulin detemir U-100, Levemir, 100 unit/mL (3 mL) SubQ InPn pen Inject 4 Units into the skin every evening. 3.6 mL 3     insulin lispro (HUMALOG KWIKPEN INSULIN) 100 unit/mL pen Inject 8 Units into the skin 3 (three) times daily before meals. 6 mL 11     isosorbide mononitrate (IMDUR) 30 MG 24 hr tablet Take 1 tablet (30 mg total) by mouth once daily. 90 tablet 3     lisinopriL (PRINIVIL,ZESTRIL) 40 MG tablet Take 1 tablet (40 mg total) by mouth every evening. 90 tablet 3     melatonin 3 mg TbDL Take 9 mg by mouth nightly as needed (sleep).       NIFEdipine (PROCARDIA-XL) 60 MG (OSM) 24 hr tablet Take 1 tablet (60 mg total) by mouth 2 (two) times a day. 60 tablet 11     nut.tx.imp.renal fxn,lac-reduc (NEPRO  CARB STEADY ORAL) Give 1 carton by mouth with each meal.       ondansetron (ZOFRAN) 8 MG tablet Take 1 tablet by mouth 3 (three) times daily as needed for Nausea.       sevelamer carbonate (RENVELA) 800 mg Tab Take 800 mg by mouth 3 (three) times daily.       sodium chloride (SALINE NASAL) 0.65 % nasal spray 1 spray by Nasal route as needed (dry nostril). 30 mL 12     tiotropium bromide (SPIRIVA RESPIMAT) 2.5 mcg/actuation inhaler Inhale 2 puffs into the lungs Daily.       triamcinolone acetonide 0.025% (KENALOG) 0.025 % cream Apply topically 2 (two) times daily.       vitamin renal formula, B-complex-vitamin c-folic acid, (NEPHROCAP) 1 mg Cap Take 1 capsule by mouth once daily.       vits A and D-white pet-lanolin ointment Apply topically 2 (two) times daily. Apply twice daily to penis 15 g 0     white petrolatum (VASELINE) ointment Apply topically once daily. Apply small amount to both nostrils daily 5 g 0        Review of patient's allergies indicates:  No Known Allergies    Past Medical History:   Diagnosis Date    Chronic kidney disease-mineral and bone disorder 10/12/2022    COPD (chronic obstructive pulmonary disease)     Diabetes mellitus type 1     ESRD on dialysis     Hypertension     Hypervolemia 2/22/2023    Hyponatremia 3/4/2023    SOB (shortness of breath) 10/12/2022    Patient with history COPD treated with Ellipta the albuterol, fluticasone-salmeterol tachypneic in the ED saturating 94% and 6 L on nasal cannula, patient does not know how many  he uses it is in nursing home.    -duo nebs Q 4  Given that patient is a poor historian, and in the setting of left lower extremity edema and history of coagulopathy PE cannot be ruled out.  Will workup for possible infec     Past Surgical History:   Procedure Laterality Date    AV FISTULA PLACEMENT       Family History       Problem Relation (Age of Onset)    Diabetes Mother          Tobacco Use    Smoking status: Never    Smokeless tobacco:  Never   Substance and Sexual Activity    Alcohol use: Not Currently    Drug use: Not on file    Sexual activity: Not Currently     Review of Systems   Constitutional:  Negative for chills and fever.   Eyes:  Negative for visual disturbance.   Respiratory:  Negative for shortness of breath.    Cardiovascular:  Negative for chest pain.        Bleeding from LUE AV graft   Gastrointestinal:  Negative for abdominal pain, blood in stool, constipation, diarrhea, nausea and vomiting.   Genitourinary:  Negative for dysuria and hematuria.   Musculoskeletal:  Negative for back pain.   Allergic/Immunologic: Negative for environmental allergies.   Neurological:  Negative for dizziness, weakness and light-headedness.   Objective:     Vital Signs (Most Recent):  Temp: 97.4 °F (36.3 °C) (06/27/23 1602)  Pulse: 63 (06/27/23 1602)  Resp: 16 (06/27/23 1602)  BP: (!) 146/78 (06/27/23 1602)  SpO2: 95 % (06/27/23 1602) Vital Signs (24h Range):  Temp:  [97.4 °F (36.3 °C)-98.4 °F (36.9 °C)] 97.4 °F (36.3 °C)  Pulse:  [63-81] 63  Resp:  [16-20] 16  SpO2:  [90 %-99 %] 95 %  BP: (128-149)/(73-83) 146/78     Weight: 69 kg (152 lb 1.9 oz)  Body mass index is 22.46 kg/m².      Physical Exam  Constitutional:       General: He is not in acute distress.     Appearance: Normal appearance. He is not ill-appearing.   Cardiovascular:      Rate and Rhythm: Normal rate and regular rhythm.      Comments: LUE AVG graft with palpable thrill  No skin breakdown, prior access sites from IV notable  2+ radial pulse, faintly palpable ulnar pulse  Pulmonary:      Effort: Pulmonary effort is normal. No respiratory distress.      Breath sounds: Normal breath sounds.   Abdominal:      General: Abdomen is flat.      Palpations: Abdomen is soft.   Musculoskeletal:         General: Normal range of motion.   Skin:     General: Skin is warm and dry.   Neurological:      General: No focal deficit present.      Mental Status: He is alert and oriented to person, place,  and time. Mental status is at baseline.        Significant Labs:  CBC:   Recent Labs   Lab 06/27/23  0457 06/27/23  1443   WBC 2.87*  --    RBC 3.45*  --    HGB 10.1* 10.9*   HCT 32.6* 34.5*     --    MCV 95  --    MCH 29.3  --    MCHC 31.0*  --      CMP:   Recent Labs   Lab 06/27/23  0457   GLU 73   CALCIUM 7.6*   ALBUMIN 2.8*   PROT 5.9*   *   K 5.0   CO2 24      BUN 39*   CREATININE 4.0*   ALKPHOS 161*   ALT 19   AST 26   BILITOT 0.5       Significant Diagnostics:  I have reviewed all pertinent imaging results/findings within the past 24 hours.    Assessment/Plan:     AV graft malfunction  Cosme Lewis is a 56 yo M with ESRD on HD MWF with LUE AV graft, HFpEF (LVEF 55%, Grade II DD on echo 3/20/23), chronic RF on home O2, IDDM2, ESRD on HD, COPD, HTN, muliple PEs on Eliquis now with bleeding from LUE AVG after HD 6/26. May have outflow stenosis of AVG, will obtain VAS HD U/S to evaluate. Currently not bleeding, stable.    - VAS HD U/S pending  - Recommend pressure dressing  - Agree with holding ASA and Eliquis  - OK for DVT ppx  - Please call vascular surgery for any further bleeding, questions, or concerns             Thank you for your consult. I will follow-up with patient. Please contact us if you have any additional questions.    Elisha Duncan MD  Vascular Surgery  [facility] - Telemetry Stepdown

## 2023-06-28 NOTE — PLAN OF CARE
Problem: Occupational Therapy  Goal: Occupational Therapy Goal  Description: Goals to be met by: 07/24/23     Patient will increase functional independence with ADLs by performing:    UE Dressing with Modified [place].  LE Dressing with Minimal Assistance.  Grooming while EOB with Modified [place].  Toileting from bedside commode with Minimal Assistance for hygiene and clothing management.   Toilet transfer to bedside commode with Minimal Assistance.  Upper extremity exercise program 3x10 reps per handout, with supervision.    Outcome: Ongoing, Progressing

## 2023-06-28 NOTE — PLAN OF CARE
Recommendations     1. Continue Diabetic diet, add Renal diet restrictions - change ONS to [facility].   2. RD to monitor & follow-up.     Goals: Meet % EEN, EPN by RD f/u date  Nutrition Goal Status: goal met  Communication of RD Recs: other (comment) (POC)

## 2023-06-28 NOTE — PROGRESS NOTES
[facility] - Telemetry Stepdown  Nephrology  Progress Note    Patient Name: Cosme Lewis  MRN: [id number]  Admission Date: 6/20/2023  Hospital Length of Stay: 7 days  Attending Provider: Cecy Garcia MD   Primary Care Physician: Primary Doctor No  Principal Problem:Acute on chronic respiratory failure with hypoxia and hypercapnia    Subjective:     Interval History:   HD completed on yesterday. Net UF 3 L. No distress on exam.     Review of patient's allergies indicates:  No Known Allergies  Current Facility-Administered Medications   Medication Frequency    0.9%  NaCl infusion Once    albuterol-ipratropium 2.5 mg-0.5 mg/3 mL nebulizer solution 3 mL Q6H PRN    artificial tears 0.5 % ophthalmic solution 1 drop 6x Daily    atorvastatin tablet 40 mg Daily    carvediloL tablet 6.25 mg BID    cetirizine tablet 10 mg Daily PRN    dextrose 10% bolus 125 mL 125 mL PRN    dextrose 10% bolus 250 mL 250 mL PRN    dextrose 40 % gel 15,000 mg PRN    dextrose 40 % gel 30,000 mg PRN    erythromycin 5 mg/gram (0.5 %) ophthalmic ointment Q8H    FLUoxetine capsule 40 mg Daily    fluticasone furoate-vilanteroL 100-25 mcg/dose diskus inhaler 1 puff Daily    glucagon (human recombinant) injection 1 mg PRN    heparin (porcine) injection 1,000 Units PRN    insulin aspart U-100 pen 1-10 Units Q4H PRN    insulin aspart U-100 pen 6 Units TIDWM    insulin detemir U-100 (Levemir) pen 6 Units BID    isosorbide mononitrate 24 hr tablet 30 mg Daily    lisinopriL tablet 40 mg QHS    melatonin tablet 3 mg Nightly PRN    NIFEdipine 24 hr tablet 60 mg BID    sevelamer carbonate tablet 800 mg TID    sodium chloride 0.9% bolus 250 mL 250 mL PRN    sodium chloride 0.9% flush 10 mL PRN    sodium chloride 0.9% flush 10 mL PRN    tiotropium bromide 2.5 mcg/actuation inhaler 2 puff Daily       Objective:     Vital Signs (Most Recent):  Temp: 96 °F (35.6 °C) (06/28/23 0409)  Pulse: 85 (06/28/23 0833)  Resp: 18 (06/28/23 0833)  BP:  118/89 (06/28/23 0736)  SpO2: (!) 94 % (06/28/23 0833) Vital Signs (24h Range):  Temp:  [96 °F (35.6 °C)-97.6 °F (36.4 °C)] 96 °F (35.6 °C)  Pulse:  [63-85] 85  Resp:  [16-18] 18  SpO2:  [91 %-99 %] 94 %  BP: (118-181)/(59-95) 118/89     Weight: 69 kg (152 lb 1.9 oz) (06/28/23 1000)  Body mass index is 22.46 kg/m².  Body surface area is 1.83 meters squared.    I/O last 3 completed shifts:  In: 711 [P.O.:711]  Out: 3500 [Other:3500]     Physical Exam  Vitals and nursing note reviewed.   Constitutional:       Appearance: Normal appearance. He is not ill-appearing.      Interventions: Nasal cannula in place.   HENT:      Head: Normocephalic and atraumatic.      Nose: Nose normal.      Mouth/Throat:      Mouth: Mucous membranes are moist.      Pharynx: Oropharynx is clear.   Eyes:      Conjunctiva/sclera: Conjunctivae normal.   Neck:      Vascular: JVD present.   Cardiovascular:      Rate and Rhythm: Regular rhythm. Bradycardia present.      Comments: LUE AVF with +thrill and +bruit   Pulmonary:      Breath sounds: No rales.      Comments: NC  Abdominal:      General: Abdomen is flat.   Musculoskeletal:      Cervical back: Neck supple.      Right lower leg: No edema.      Left lower leg: No edema.   Skin:     General: Skin is warm and dry.   Neurological:      General: No focal deficit present.      Mental Status: He is alert and oriented to person, place, and time.   Psychiatric:         Mood and Affect: Mood normal.         Behavior: Behavior normal. Behavior is cooperative.        Significant Labs:  CBC:   Recent Labs   Lab 06/28/23  0446   WBC 3.14*   RBC 3.39*   HGB 10.1*   HCT 31.9*      MCV 94   MCH 29.8   MCHC 31.7*     CMP:   Recent Labs   Lab 06/28/23  0446   GLU 98   CALCIUM 8.2*   ALBUMIN 2.8*   PROT 6.1   *   K 4.2   CO2 25   CL 97   BUN 25*   CREATININE 3.0*   ALKPHOS 161*   ALT 17   AST 21   BILITOT 0.6     All labs within the past 24 hours have been reviewed.     Assessment/Plan:      Pulmonary  * Acute on chronic respiratory failure with hypoxia and hypercapnia  - defer to primary team     Cardiac/Vascular  Hypertension  Carvedilol 6.25 mg BID  IMDUR 30 mg QD  Lisinopril 40 mg QHS  Nifedipine 60 mg BID    Improved. UF with HD.      Renal/  Chronic kidney disease-mineral and bone disorder  -Renal diet   -Continue renvela     ESRD (end stage renal disease)  Outpatient HD Information:    -Outpatient HD unit: [facility]   -HD tx days: MWF   -Last HD tx prior to presentation: 6/16/23  -HD access: LUE AVF   -HD modality: iHD   -Residual urine: Anuric       Plan/Recommendations:  -No further HD requirements at this time.   -Strict I/O's and daily weights  -Daily renal function panels   -Renally dose meds    Oncology  Anemia in ESRD (end-stage renal disease)  -Target Hg of 10-12. At goal.   -No need for MARCIA therapy        Thank you for your consult. I will follow-up with patient. Please contact us if you have any additional questions.    Rosi Grant DNP, FNP-C  Nephrology  [facility] - Telemetry Stepdown

## 2023-06-28 NOTE — SUBJECTIVE & OBJECTIVE
Interval History:   HD completed on yesterday. Net UF 3 L. No distress on exam.     Review of patient's allergies indicates:  No Known Allergies  Current Facility-Administered Medications   Medication Frequency    0.9%  NaCl infusion Once    albuterol-ipratropium 2.5 mg-0.5 mg/3 mL nebulizer solution 3 mL Q6H PRN    artificial tears 0.5 % ophthalmic solution 1 drop 6x Daily    atorvastatin tablet 40 mg Daily    carvediloL tablet 6.25 mg BID    cetirizine tablet 10 mg Daily PRN    dextrose 10% bolus 125 mL 125 mL PRN    dextrose 10% bolus 250 mL 250 mL PRN    dextrose 40 % gel 15,000 mg PRN    dextrose 40 % gel 30,000 mg PRN    erythromycin 5 mg/gram (0.5 %) ophthalmic ointment Q8H    FLUoxetine capsule 40 mg Daily    fluticasone furoate-vilanteroL 100-25 mcg/dose diskus inhaler 1 puff Daily    glucagon (human recombinant) injection 1 mg PRN    heparin (porcine) injection 1,000 Units PRN    insulin aspart U-100 pen 1-10 Units Q4H PRN    insulin aspart U-100 pen 6 Units TIDWM    insulin detemir U-100 (Levemir) pen 6 Units BID    isosorbide mononitrate 24 hr tablet 30 mg Daily    lisinopriL tablet 40 mg QHS    melatonin tablet 3 mg Nightly PRN    NIFEdipine 24 hr tablet 60 mg BID    sevelamer carbonate tablet 800 mg TID    sodium chloride 0.9% bolus 250 mL 250 mL PRN    sodium chloride 0.9% flush 10 mL PRN    sodium chloride 0.9% flush 10 mL PRN    tiotropium bromide 2.5 mcg/actuation inhaler 2 puff Daily       Objective:     Vital Signs (Most Recent):  Temp: 96 °F (35.6 °C) (06/28/23 0409)  Pulse: 85 (06/28/23 0833)  Resp: 18 (06/28/23 0833)  BP: 118/89 (06/28/23 0736)  SpO2: (!) 94 % (06/28/23 0833) Vital Signs (24h Range):  Temp:  [96 °F (35.6 °C)-97.6 °F (36.4 °C)] 96 °F (35.6 °C)  Pulse:  [63-85] 85  Resp:  [16-18] 18  SpO2:  [91 %-99 %] 94 %  BP: (118-181)/(59-95) 118/89     Weight: 69 kg (152 lb 1.9 oz) (06/28/23 1000)  Body mass index is 22.46 kg/m².  Body surface area is 1.83 meters squared.    I/O last 3  completed shifts:  In: 711 [P.O.:711]  Out: 3500 [Other:3500]     Physical Exam  Vitals and nursing note reviewed.   Constitutional:       Appearance: Normal appearance. He is not ill-appearing.      Interventions: Nasal cannula in place.   HENT:      Head: Normocephalic and atraumatic.      Nose: Nose normal.      Mouth/Throat:      Mouth: Mucous membranes are moist.      Pharynx: Oropharynx is clear.   Eyes:      Conjunctiva/sclera: Conjunctivae normal.   Neck:      Vascular: JVD present.   Cardiovascular:      Rate and Rhythm: Regular rhythm. Bradycardia present.      Comments: LUE AVF with +thrill and +bruit   Pulmonary:      Breath sounds: No rales.      Comments: NC  Abdominal:      General: Abdomen is flat.   Musculoskeletal:      Cervical back: Neck supple.      Right lower leg: No edema.      Left lower leg: No edema.   Skin:     General: Skin is warm and dry.   Neurological:      General: No focal deficit present.      Mental Status: He is alert and oriented to person, place, and time.   Psychiatric:         Mood and Affect: Mood normal.         Behavior: Behavior normal. Behavior is cooperative.        Significant Labs:  CBC:   Recent Labs   Lab 06/28/23  0446   WBC 3.14*   RBC 3.39*   HGB 10.1*   HCT 31.9*      MCV 94   MCH 29.8   MCHC 31.7*     CMP:   Recent Labs   Lab 06/28/23  0446   GLU 98   CALCIUM 8.2*   ALBUMIN 2.8*   PROT 6.1   *   K 4.2   CO2 25   CL 97   BUN 25*   CREATININE 3.0*   ALKPHOS 161*   ALT 17   AST 21   BILITOT 0.6     All labs within the past 24 hours have been reviewed.

## 2023-06-28 NOTE — PLAN OF CARE
Plan of care reviewed with pt. Pt aaox4. Pt still on 3 L O2.  Pt remained free of falls, trauma, and injury. Will continue to monitor

## 2023-06-28 NOTE — PT/OT/SLP PROGRESS
"Occupational Therapy   Treatment    Name: Cosme Lewis  MRN: [id number]  Admitting Diagnosis:  Acute on chronic respiratory failure with hypoxia and hypercapnia       Recommendations:     Discharge Recommendations: home health OT (return to NH with HH therapy services)  Discharge Equipment Recommendations:   (Per NH.)  Barriers to discharge:  None    Assessment:     Cosme Lewis is a 57 y.o. male with a medical diagnosis of Acute on chronic respiratory failure with hypoxia and hypercapnia.  Performance deficits affecting function are weakness, impaired endurance, impaired self care skills, impaired functional mobility, decreased coordination, decreased lower extremity function, decreased upper extremity function, impaired balance, gait instability, decreased safety awareness, impaired cardiopulmonary response to activity.     Pt pleasant and willing to participate in tx session this date; pt w/ weakness and decreased endurance, but was able to tolerated standing ADLs and BUE AROM for increasing overall strength endurance for safe performance w/ ADLs and functional mobility. Pt tolerated tx session well w/ rest breaks as needed; pt will continue to benefit from skilled acute OT services to maximize functional capacity for safe performance w/ ADLs and functional mobility.     Rehab Prognosis:  Good; patient would benefit from acute skilled OT services to address these deficits and reach maximum level of function.       Plan:     Patient to be seen 3 x/week to address the above listed problems via self-care/home management, therapeutic activities, therapeutic exercises  Plan of Care Expires: 07/24/23  Plan of Care Reviewed with: patient    Subjective     Chief Complaint: Weakness   Patient/Family Comments/goals: "I think I did good for the first day."   Pain/Comfort:  Pain Rating 1: 0/10  Pain Rating Post-Intervention 1: 0/10    Objective:     Communicated with: Nurse prior to session.  Patient found up in chair with " telemetry, oxygen upon OT entry to room.    General Precautions: Standard, fall, aspiration, hearing impaired    Orthopedic Precautions:N/A  Braces: N/A  Respiratory Status: Nasal cannula, flow 3 L/min     Occupational Performance:     Bed Mobility:    Pt found/left in chair.      Functional Mobility/Transfers:  Patient completed Sit <> Stand Transfer x 2 trials from chair with minimum assistance and moderate assistance  with  rolling walker; pt required v/t cueing for safe hand placement to ensure good transitional movement   Functional Mobility: Pt declined functional, in-room ambulation at this time.     Activities of Daily Living:  Upper Body Dressing: minimum assistance for donning gown over back   Lower Body Dressing: total assistance for donning brief in standing w/ BUE support on RW; pt was able to stand ~2.5 min for alvaro-care in which pt became fatigue and required assist for returning to chair   Toileting: total assistance for posterior alvaro-care in standing w/ BUR support on RW    [facility] 6 Click ADL: 18    Treatment & Education:  -Pt re-educated on role of OT and POC.   -Pt educated on safe functional mobility and ADL performance.   -Pt educated on importance of PLB and energy conservation.   -Pt performed the following BUE TE w/ use of red theraband for 1 set x 10 reps to increase UB strength and endurance for safe performance w/ ADLs and functional mobility:    -shoulder horizontal abd/add   -elbow flexion    -shoulder IR/ER    -diagonal shoulder raises   -Questions and concerns addressed within scope.     Patient left up in chair with all lines intact and call button in reach    GOALS:   Multidisciplinary Problems       Occupational Therapy Goals          Problem: Occupational Therapy    Goal Priority Disciplines Outcome Interventions   Occupational Therapy Goal     OT, PT/OT Ongoing, Progressing    Description: Goals to be met by: 07/24/23     Patient will increase functional independence with ADLs by  performing:    UE Dressing with Modified [place].  LE Dressing with Minimal Assistance.  Grooming while EOB with Modified [place].  Toileting from bedside commode with Minimal Assistance for hygiene and clothing management.   Toilet transfer to bedside commode with Minimal Assistance.  Upper extremity exercise program 3x10 reps per handout, with supervision.                         Time Tracking:     OT Date of Treatment: 06/28/23  OT Start Time: 0855  OT Stop Time: 0918  OT Total Time (min): 23 min    Billable Minutes:Self Care/Home Management 13  Therapeutic Exercise 10  Total Time 23    OT/CHRISTOPHER: OT          6/28/2023

## 2023-06-28 NOTE — NURSING
1:1 bedside HD tx started  15ga to LAVF without any difficulty  Machine settings and orders verified

## 2023-06-29 NOTE — ASSESSMENT & PLAN NOTE
Chronic, controlled.  Latest blood pressure and vitals reviewed-   Temp:  [97.7 °F (36.5 °C)-98.7 °F (37.1 °C)]   Pulse:  [65-80]   Resp:  [18-20]   BP: (140-160)/(66-85)   SpO2:  [84 %-100 %] .   Home meds for hypertension were reviewed and noted below. Hospital anti-hypertensive changes were made as shown below.  Hypertension Medications             carvediloL (COREG) 6.25 MG tablet Take 6.25 mg by mouth 2 (two) times daily.    isosorbide mononitrate (IMDUR) 30 MG 24 hr tablet Take 1 tablet (30 mg total) by mouth once daily.   PRN meds if BP> 180/110 mm HG. continue lisinopril  and procardia

## 2023-06-29 NOTE — PLAN OF CARE
Problem: Infection  Goal: Absence of Infection Signs and Symptoms  Outcome: Ongoing, Not Progressing     Problem: Adult Inpatient Plan of Care  Goal: Plan of Care Review  Outcome: Ongoing, Not Progressing  Goal: Patient-Specific Goal (Individualized)  Outcome: Ongoing, Not Progressing  Goal: Absence of Hospital-Acquired Illness or Injury  Outcome: Ongoing, Not Progressing  Goal: Optimal Comfort and Wellbeing  Outcome: Ongoing, Not Progressing  Goal: Readiness for Transition of Care  Outcome: Ongoing, Not Progressing     Problem: Diabetes Comorbidity  Goal: Blood Glucose Level Within Targeted Range  Outcome: Ongoing, Not Progressing     Problem: Impaired Wound Healing  Goal: Optimal Wound Healing  Outcome: Ongoing, Not Progressing     Problem: Device-Related Complication Risk (Hemodialysis)  Goal: Safe, Effective Therapy Delivery  Outcome: Ongoing, Not Progressing     Problem: Hemodynamic Instability (Hemodialysis)  Goal: Effective Tissue Perfusion  Outcome: Ongoing, Not Progressing     Problem: Infection (Hemodialysis)  Goal: Absence of Infection Signs and Symptoms  Outcome: Ongoing, Not Progressing     Problem: Fall Injury Risk  Goal: Absence of Fall and Fall-Related Injury  Outcome: Ongoing, Not Progressing     Problem: Skin Injury Risk Increased  Goal: Skin Health and Integrity  Outcome: Ongoing, Not Progressing    Pt AAOx4. VSS. BP and BG elevated throughout shift. Scheduled insulin and coverage administered. No complaints of pain or discomfort. Routine meds administered. Up in chair majority of shift. Safety measures maintained.

## 2023-06-29 NOTE — PT/OT/SLP PROGRESS
"Occupational Therapy   Treatment    Name: Cosme Lewis  MRN: [id number]  Admitting Diagnosis:  Acute on chronic respiratory failure with hypoxia and hypercapnia       Recommendations:     Discharge Recommendations: home health OT (return to NH with HH services)  Discharge Equipment Recommendations:   (Per NH.)  Barriers to discharge:       Assessment:     Cosme Lewis is a 57 y.o. male with a medical diagnosis of Acute on chronic respiratory failure with hypoxia and hypercapnia.  Therapy session focused on providing education on progressive mobilization and therapeutic exercise. Pt initially reluctant to participate requiring Mod  encouragement for active participation. Pt educated on continued mobilization.  He presents with deficits listed below. Performance deficits affecting function are weakness, impaired endurance, impaired self care skills, impaired functional mobility, gait instability, decreased coordination, decreased upper extremity function, decreased lower extremity function, decreased safety awareness, impaired cardiopulmonary response to activity.     Rehab Prognosis:  Good; patient would benefit from acute skilled OT services to address these deficits and reach maximum level of function.       Plan:     Patient to be seen 3 x/week to address the above listed problems via self-care/home management, therapeutic activities, therapeutic exercises  Plan of Care Expires: 07/24/23  Plan of Care Reviewed with: patient    Subjective     Chief Complaint: " I want to eat something, im hungry.  if you can get me a eun cracker, I'll move with you"  Patient/Family Comments/goals: Return to NH  Pain/Comfort:  Pain Rating 1: 0/10    Objective:     Communicated with: Viv Mathews prior to session.  Patient found up in chair with telemetry, peripheral IV, oxygen upon OT entry to room.    General Precautions: Standard, fall, aspiration, hearing impaired    Orthopedic Precautions:N/A  Braces: N/A  Respiratory Status: Nasal " cannula, flow 4 L/min     Occupational Performance:     Bed Mobility:    None observed, Pt up in chair.      Functional Mobility/Transfers:  Pt refusing transfers this date 2/2 to Pt fatigue.   Functional Mobility: With maximal encouragement Pt  repositioning self in chair, with 2x scoots to back of chair for improved posture and to decrease risk of falls and feet elevated.    Activities of Daily Living:  Feeding:  supervision to self feed eun crackers while seated up in chair.       [facility] 6 Click ADL: 17    Treatment & Education:  Role of OT and OT POC  BUE exercises with red theraband. ( Scapular retraction, shoulder flex, and tricep extension) x 10 reps. Pt requiring extended time 2/2 to reluctance to participate, requiring verbal encouragement for activity completion.   Educated on continued OOB activity with staff A and impact on increasing functional independence.  Educated on importance of progressive mobilization to increase strength and endurance.      Patient left up in chair with all lines intact and call button in reach    GOALS:   Multidisciplinary Problems       Occupational Therapy Goals          Problem: Occupational Therapy    Goal Priority Disciplines Outcome Interventions   Occupational Therapy Goal     OT, PT/OT Ongoing, Progressing    Description: Goals to be met by: 07/24/23     Patient will increase functional independence with ADLs by performing:    UE Dressing with Modified [place].  LE Dressing with Minimal Assistance.  Grooming while EOB with Modified [place].  Toileting from bedside commode with Minimal Assistance for hygiene and clothing management.   Toilet transfer to bedside commode with Minimal Assistance.  Upper extremity exercise program 3x10 reps per handout, with supervision.                         Time Tracking:     OT Date of Treatment: 06/29/23  OT Start Time: 1140  OT Stop Time: 1205  OT Total Time (min): 25 min    Billable Minutes:Therapeutic Activity  13  Therapeutic Exercise 12    OT/CHRISTOPHER: OT          6/29/2023

## 2023-06-29 NOTE — ASSESSMENT & PLAN NOTE
Nutrition consulted. Most recent weight and BMI monitored-      Measurements:      Wt Readings from Last 1 Encounters:   06/28/23 69 kg (152 lb 1.9 oz)   Body mass index is 22.46 kg/m².     Patient has been screened and assessed by RD.     Malnutrition Type:  Context: chronic illness  Level: moderate     Malnutrition Characteristic Summary:  Weight Loss (Malnutrition): 10% in 6 months  Subcutaneous Fat (Malnutrition): moderate depletion  Muscle Mass (Malnutrition): moderate depletion     Interventions/Recommendations (treatment strategy):  1. Continue Diabetic diet, add Renal diet restrictions - change ONS to [facility]. 2. RD to monitor & follow-up.

## 2023-06-29 NOTE — ASSESSMENT & PLAN NOTE
Patient's FSGs are controlled on current hypoglycemics.   Last A1c reviewed-   Lab Results   Component Value Date    HGBA1C 10.7 (H) 06/21/2023    HGBA1C 9.5 (H) 03/06/2023    HGBA1C 7.5 (H) 12/20/2022     Will hold PO hypoglycemics and will start correctional scale insulin  Most recent fingerstick glucose reviewed-   Recent Labs   Lab 06/28/23  2034 06/29/23  0838 06/29/23  1144 06/29/23  1614   POCTGLUCOSE 270* 243* 301* 313*     currently on   Antihyperglycemics (From admission, onward)    Start     Stop Route Frequency Ordered    06/25/23 0715  insulin aspart U-100 pen 6 Units         -- SubQ 3 times daily with meals 06/24/23 1826    06/24/23 2100  insulin detemir U-100 (Levemir) pen 6 Units         -- SubQ 2 times daily 06/24/23 1827    06/24/23 0345  insulin aspart U-100 pen 1-10 Units         -- SubQ Every 4 hours PRN 06/24/23 0240

## 2023-06-29 NOTE — PROGRESS NOTES
Subjective:      Patient ID: Cosme Lewis is a 57 y.o. male.    Chief Complaint:  No chief complaint on file.    History of Present Illness  Cosme Lewis is here for follow up of DM.  Previously seen by me 5/2023.    Living at [facility] - has been there a few months.     [facility]    With regards to diabetes:    Diagnosed: 2018   Latest Reference Range & Units 01/05/23 15:06   Glucose 70 - 110 mg/dL  70 - 110 mg/dL 157 (H)  157 (H)   C-Peptide 0.78 - 5.19 ng/mL  0.78 - 5.19 ng/mL 0.09 (L)  0.09 (L)   (H): Data is abnormally high  (L): Data is abnormally low    Known complications:  DKA + most recent 3/2023   RN +  Eye Exam: 3/2023  PN Denies None   Podiatry: None  Nephropathy + ESRD on Dialysis MWF  CAD +     Current regimen: per chart review from [facility] 8 units twice daily   Humalog 8 units plus correction scale before meals   Add correction scale as needed.  Blood sugar 201 to 250 add 2 units  Blood sugar 251 to 300 add 4 units  Blood sugar 301 to 350 add 6 units  Blood sugar greater than 350 add 8 units    Patient reports he is getting long-acting insulin 20 units nightly and 5 units fast acting before meals     Other medications tried:  None    Glucose Monitor:   4 times a day testing  Log reviewed: none     Hypoglycemia:  Denies signs or symptoms.   Knows how to correct with 15 grams of carbs- juice, coke, or a peppermint.     Diet/Exercise:  Eats 3 meals a day.   Snacks Denies  Drinks: water, juice  Exercise: None.     Diabetes Management Status    Hemoglobin A1C   Date Value Ref Range Status   06/21/2023 10.7 (H) 4.0 - 5.6 % Final     Comment:     ADA Screening Guidelines:  5.7-6.4%  Consistent with prediabetes  >or=6.5%  Consistent with diabetes    High levels of fetal hemoglobin interfere with the HbA1C  assay. Heterozygous hemoglobin variants (HbS, HgC, etc)do  not significantly interfere with this assay.   However, presence of multiple variants may affect accuracy.    "  03/06/2023 9.5 (H) 4.0 - 5.6 % Final     Comment:     ADA Screening Guidelines:  5.7-6.4%  Consistent with prediabetes  >or=6.5%  Consistent with diabetes    High levels of fetal hemoglobin interfere with the HbA1C  assay. Heterozygous hemoglobin variants (HbS, HgC, etc)do  not significantly interfere with this assay.   However, presence of multiple variants may affect accuracy.     12/20/2022 7.5 (H) 4.0 - 5.6 % Final     Comment:     ADA Screening Guidelines:  5.7-6.4%  Consistent with prediabetes  >or=6.5%  Consistent with diabetes    High levels of fetal hemoglobin interfere with the HbA1C  assay. Heterozygous hemoglobin variants (HbS, HgC, etc)do  not significantly interfere with this assay.   However, presence of multiple variants may affect accuracy.         Statin: Taking  ACE/ARB: Taking  Screening or Prevention Patient's value Goal Complete/Controlled?   HgA1C Testing and Control   Lab Results   Component Value Date    HGBA1C 10.7 (H) 06/21/2023      Annually/Less than 8% No     Lipid profile : 06/21/2023 Annually Yes     LDL control Lab Results   Component Value Date    LDLCALC 30.0 (L) 06/21/2023    Annually/Less than 100 mg/dl  Yes     Nephropathy screening Lab Results   Component Value Date    LABMICR 26.6 08/15/2009     No results found for: PROTEINUA Annually No     Blood pressure BP Readings from Last 1 Encounters:   07/06/23 130/75    Less than 140/90 No     Dilated retinal exam : 03/28/2023 Annually Yes     Foot exam   : 10/12/2022 Annually Yes         Review of Systems  As above      Visit Vitals  /75   Pulse 89   Ht 5' 9" (1.753 m)   Wt 68.9 kg (152 lb)   SpO2 99%   BMI 22.45 kg/m²         Body mass index is 22.45 kg/m².    Lab Review:   Lab Results   Component Value Date    HGBA1C 10.7 (H) 06/21/2023    HGBA1C 9.5 (H) 03/06/2023    HGBA1C 7.5 (H) 12/20/2022       Lab Results   Component Value Date    CHOL 70 (L) 06/21/2023    HDL 34 (L) 06/21/2023    LDLCALC 30.0 (L) 06/21/2023    TRIG " 30 06/21/2023    CHOLHDL 48.6 06/21/2023     Lab Results   Component Value Date     (L) 06/30/2023    K 4.7 06/30/2023    CL 96 06/30/2023    CO2 25 06/30/2023     (H) 06/30/2023    BUN 20 06/30/2023    CREATININE 4.9 (H) 06/30/2023    CALCIUM 8.0 (L) 06/30/2023    PROT 6.4 06/30/2023    ALBUMIN 3.0 (L) 06/30/2023    BILITOT 0.6 06/30/2023    ALKPHOS 174 (H) 06/30/2023    AST 27 06/30/2023    ALT 21 06/30/2023    ANIONGAP 12 06/30/2023    TSH 0.961 06/20/2023     No results found for: [id number]  Assessment and Plan     1. Type 2 diabetes mellitus with hyperglycemia, with long-term current use of insulin        2. ESRD (end stage renal disease)            Type 2 diabetes mellitus with hyperglycemia, with long-term current use of insulin  -- Patient is a poor historian.  -- Labs in 3 months.  -- A1c goal <7.5%.  -- Medications discussed:  Insulin   -- Reviewed logs/CGM:  Facility did not send in a glucose log and patient unable to recall SMBG checks.   Insufficient data.  Facility to send in glucose logs to make changes.   -- Medication Changes:   Insufficient data.     Levemir 8 units twice daily   Humalog 8 units plus correction scale before meals   Add correction scale as needed.  Blood sugar 201 to 250 add 2 units  Blood sugar 251 to 300 add 4 units  Blood sugar 301 to 350 add 6 units  Blood sugar greater than 350 add 8 units    Patient reports he is getting long-acting insulin 20 units nightly and 5 units fast acting before meals     -- Reviewed goals of therapy are to get the best control we can without hypoglycemia.  -- Reviewed patient's current insulin regimen. Clarified proper insulin dose and timing in relation to meals, etc. Insulin injection sites and proper rotation instructed.    -- Advised frequent self blood glucose monitoring.  Patient encouraged to document glucose results and bring them to every clinic visit.  -- Hypoglycemia precautions discussed. Instructed on precautions before  driving.    -- Call for Bg repeatedly < 90 or > 180.   -- Close adherence to lifestyle changes recommended.   -- Periodic follow ups for eye evaluations, foot care and dental care suggested.    ESRD (end stage renal disease)  -- Continue following with Nephrology.        Follow up in about 3 months (around 10/6/2023).

## 2023-06-29 NOTE — ASSESSMENT & PLAN NOTE
Bleeding from avf after HD 6/26  -concern for continued bleeding since dressings are wet with blood, also had transient nosebleed  -aspirin and Eliquis held 6/27   -stable h/h, elevated coags  -Vascular sx consult - US ordered  -monitor for recurrent bleeding after today's HD  -f/u Vascular sx recs

## 2023-06-29 NOTE — PLAN OF CARE
Patient AAOx3, easily reorient to time. Able to make needs known to staff. AROM to all extremities, urinal at bedside. CBG assessed, PRN and STAT orders followed per insulin, poor appetite. No apparent distress noted, will continue to follow plan of care.    Problem: Infection  Goal: Absence of Infection Signs and Symptoms  Outcome: Ongoing, Progressing     Problem: Adult Inpatient Plan of Care  Goal: Plan of Care Review  Outcome: Ongoing, Progressing  Goal: Patient-Specific Goal (Individualized)  Outcome: Ongoing, Progressing  Goal: Absence of Hospital-Acquired Illness or Injury  Outcome: Ongoing, Progressing  Goal: Optimal Comfort and Wellbeing  Outcome: Ongoing, Progressing  Goal: Readiness for Transition of Care  Outcome: Ongoing, Progressing     Problem: Diabetes Comorbidity  Goal: Blood Glucose Level Within Targeted Range  Outcome: Ongoing, Progressing     Problem: Impaired Wound Healing  Goal: Optimal Wound Healing  Outcome: Ongoing, Progressing     Problem: Device-Related Complication Risk (Hemodialysis)  Goal: Safe, Effective Therapy Delivery  Outcome: Ongoing, Progressing

## 2023-06-29 NOTE — ASSESSMENT & PLAN NOTE
Acute on chronic hypoxemic and hypercapnic respiratory failure- No PFts. Due to chronic HF/ESRD with volume overload. Reported h/o COPD. Off NIV with fluid removal with HD, continue supplemental oxygen.     6/20 on 4lnc

## 2023-06-29 NOTE — ASSESSMENT & PLAN NOTE
Bleeding from avf after HD 6/26  -concern for continued bleeding since dressings are wet with blood, also had transient nosebleed  -aspirin and Eliquis held 6/27   -stable h/h, elevated coags  -Vascular sx consult - US ordered  -monitor for recurrent bleeding after today's HD  -f/u Vascular sx recs    6/29 resumed ASA and eliquis per vascular surgery

## 2023-06-29 NOTE — PLAN OF CARE
[facility]     Department of Hospital Medicine     [facility] [place] [address]     [phone] after hours  [phone] fax       NURSING HOME ORDERS    Patient Name: Cosme Lewis  YOB: [birth year]    Admit to Nursing Home:   regular Bed                                                Diagnoses:  Active Hospital Problems    Diagnosis  POA    *Acute on chronic respiratory failure with hypoxia and hypercapnia [J96.21, J96.22]  Yes    End-stage renal disease on hemodialysis [N18.6, Z99.2]  Not Applicable    AV graft malfunction [T82.590A]  No    Acute on chronic diastolic heart failure [I50.33]  Yes    Mastoiditis of left side [H70.92]  Yes    Moderate malnutrition [E44.0]  Yes    Hypertension [I10]  Yes    COPD (chronic obstructive pulmonary disease) [J44.9]  Yes    Type 2 diabetes mellitus with hyperglycemia, with long-term current use of insulin [E11.65, Z79.4]  Not Applicable     Chronic    Malnutrition of mild degree [E44.1]  Yes    Anemia in ESRD (end-stage renal disease) [N18.6, D63.1]  Yes     Chronic    HLD (hyperlipidemia) [E78.5]  Yes     Chronic    Multiple subsegmental pulmonary emboli without acute cor pulmonale [I26.94]  Yes     Chronic    Chronic hypoxemic respiratory failure [J96.11]  Yes     Chronic    Diabetes mellitus with chronic kidney disease on chronic dialysis [E10.22, N18.6, Z99.2]  Not Applicable    Chronic kidney disease-mineral and bone disorder [N18.9, E83.9, M89.9]  Yes    ESRD (end stage renal disease) [N18.6]  Yes      Resolved Hospital Problems    Diagnosis Date Resolved POA    Mastoiditis of right side [H70.91] 06/21/2023 Yes       Patient is homebound due to:  Acute on chronic respiratory failure with hypoxia and hypercapnia    Allergies:Review of patient's allergies indicates:  No Known Allergies    Vitals:        Every shift (Skilled Nursing patients)    Diet: cardiac diet, diabetic diet: 2000 calorie,  and renal diet                    Acitivities:    - Up in a chair each morning as tolerated  - Ambulate with assistance to bathroom  - Scheduled walks once each shift (every 8 hours)  - May ambulate independently  - May use walker, cane, or self-propelled wheelchair       - Weight bearing: as tolerated     LABS:  Per facility protocol     Nursing Precautions:     - Aspiration precautions:             - Total assistance with meals            -  Upright 90 degrees befor during and after meals             -  Suction at bedside          - Fall precautions per nursing home protocol     - Decubitus precautions:        -  for positioning   - Pressure reducing foam mattress   - Turn patient every two hours. Use wedge pillows to anchor patient    CONSULTS:      Physical Therapy to evaluate and treat     Occupational Therapy to evaluate and treat         MISCELLANEOUS CARE:    oxygen - 4L via nasal canula continuous    Bipap q Hs - IPAP/EPAP 15/5 cm H20 , Fio2 40%     Routine Skin for Bedridden Patients:  Apply moisture barrier cream to all    skin folds and wet areas in perineal area daily and after baths and                           all bowel movements.            DIABETES CARE:       Check blood sugar:       Fingerstick blood sugar AC and HS          Report CBG < 60 or > 400 to physician.                                          Insulin Sliding Scale          Glucose  Novolog Insulin Subcutaneous        0 - 60   Orange juice or glucose tablet, hold insulin      No insulin   201-250  2 units   251-300  4 units   301-350  6 units   351-400  8 units   >400   10 units then call physician      Medications: Discontinue all previous medication orders, if any. See new list below.        Medication List        Change how you take these medications      atorvastatin 40 MG tablet  Commonly known as: LIPITOR  Take 1 tablet (40 mg total) by mouth once daily.  What changed: when to take this            Continue taking  these medications      acetaminophen 650 MG Tbsr  Commonly known as: TYLENOL  Take 650 mg by mouth every 8 (eight) hours as needed (pain or fever over 100.4).     albuterol 90 mcg/actuation inhaler  Commonly known as: PROVENTIL/VENTOLIN HFA  Inhale 2 puffs into the lungs 4 (four) times daily as needed (breathing).     albuterol-ipratropium 2.5 mg-0.5 mg/3 mL nebulizer solution  Commonly known as: DUO-NEB  Take 3 mLs by nebulization every 4 (four) hours while awake. Rescue     apixaban 5 mg Tab  Commonly known as: ELIQUIS  Take 5 mg by mouth 2 (two) times daily.     artificial tears 0.5 % ophthalmic solution  Commonly known as: ISOPTO TEARS  Place 1 drop into both eyes 6 (six) times daily.     aspirin 81 MG EC tablet  Commonly known as: ECOTRIN  Take 81 mg by mouth once daily.     carvediloL 6.25 MG tablet  Commonly known as: COREG  Take 6.25 mg by mouth 2 (two) times daily.     cetirizine 10 MG tablet  Commonly known as: ZYRTEC  Take 10 mg by mouth daily as needed (itching).     cloNIDine 0.3 mg/24 hr td ptwk 0.3 mg/24 hr  Commonly known as: CATAPRES  APPLY ONE PATCH TOPICALLY EVERY WEEK FOR HIGH BLOOD PRESSURE     epoetin xavier 10,000 unit/mL injection  Commonly known as: PROCRIT  Inject 0.7 mLs (7,000 Units total) into the skin every Tues, Thurs, Sat.     FLUoxetine 40 MG capsule  Take 40 mg by mouth once daily.     fluticasone-salmeterol 250-50 mcg/dose 250-50 mcg/dose diskus inhaler  Commonly known as: ADVAIR  Inhale 1 puff into the lungs 2 (two) times daily.     GLUCAGON EMERGENCY KIT (HUMAN) 1 mg Solr  Generic drug: glucagon  1 mg into the muscle as needed if CBG < 70     HYDROPHILIC CREAM TOP  Apply liberal amount to affected area twice daily for dry skin     * insulin detemir U-100 (Levemir) 100 unit/mL (3 mL) Inpn pen  Inject 4 Units into the skin every evening.     * insulin detemir U-100 (Levemir) 100 unit/mL (3 mL) Inpn pen  Inject 8 Units into the skin once daily.     insulin lispro 100 unit/mL  pen  Commonly known as: HumaLOG KwikPen Insulin  Inject 8 Units into the skin 3 (three) times daily before meals.     isosorbide mononitrate 30 MG 24 hr tablet  Commonly known as: IMDUR  Take 1 tablet (30 mg total) by mouth once daily.     lisinopriL 40 MG tablet  Commonly known as: PRINIVIL,ZESTRIL  Take 1 tablet (40 mg total) by mouth every evening.     melatonin 3 mg Tbdl  Take 9 mg by mouth nightly as needed (sleep).     NEPRO CARB STEADY ORAL  Give 1 carton by mouth with each meal.     NIFEdipine 60 MG (OSM) 24 hr tablet  Commonly known as: PROCARDIA-XL  Take 1 tablet (60 mg total) by mouth 2 (two) times a day.     ondansetron 8 MG tablet  Commonly known as: ZOFRAN  Take 1 tablet by mouth 3 (three) times daily as needed for Nausea.     sevelamer carbonate 800 mg Tab  Commonly known as: RENVELA  Take 800 mg by mouth 3 (three) times daily.     sodium chloride 0.65 % nasal spray  Commonly known as: Saline NasaL  1 spray by Nasal route as needed (dry nostril).     tiotropium bromide 2.5 mcg/actuation inhaler  Commonly known as: SPIRIVA RESPIMAT  Inhale 2 puffs into the lungs Daily.     vitamin renal formula (B-complex-vitamin c-folic acid) 1 mg Cap  Commonly known as: NEPHROCAP  Take 1 capsule by mouth once daily.     white petrolatum ointment  Commonly known as: VASELINE  Apply topically once daily. Apply small amount to both nostrils daily      * This list has 2 medication(s) that are the same as other medications prescribed for you. Read the directions carefully, and ask your doctor or other care provider to review them with you.       Stop taking these medications      calcium acetate(phosphat bind) 667 mg capsule  Commonly known as: PHOSLO     cloNIDine 0.1 MG tablet  Commonly known as: CATAPRES     doxycycline 100 MG Cap  Commonly known as: VIBRAMYCIN     erythromycin ophthalmic ointment  Commonly known as: ROMYCIN     triamcinolone acetonide 0.025% 0.025 % cream  Commonly known as: KENALOG     vits A and  D-white pet-lanolin ointment               _________________________________  Jonathan Marie MD  06/30/2023

## 2023-06-29 NOTE — PROGRESS NOTES
[facility] - Telemetry [facility] Medicine  Progress Note    Patient Name: Cosme Lewis  MRN: [id number]  Patient Class: IP- Inpatient   Admission Date: 6/20/2023  Length of Stay:7 days  Attending Physician: Cecy Garcia MD  Primary Care Provider: Primary Doctor No        Subjective:     Principal Problem:Acute on chronic respiratory failure with hypoxia and hypercapnia        HPI:  Copied from MICU team who admitted the patient:    Mr. Cosme Lewis is a 56 y/o M with hx of HFpEF (LVEF 55%, Grade II DD on echo 3/20/23), chronic RF on home O2, IDDM2, ESRD on HD, COPD, HTN, muliple PEs on eliquis who presented to the ED from [facility] for evaluation of AMS and hypoxia. He was in his usual state of health until yesterday afternoon when staff noticed he was more lethargic than normal, and was satting in the 80s.      In the ED, patient afebrile, hypertensive to 170s systolic, HR 50s, hypoxic requiring 10L NRB. CBC grossly unchanged from BL with chronic leukopenia and anemia. CMP also grossly unchanged from BL, with known mild hyponatremia. Lactic elevated to 3.0. BNP 3200. Trops wnl. Procal and CRP elevated. CXR demonstrating bilateral edema as well as L pleural effusion. CTH showing L mastoiditis, otherwise no acute intracranial abnormality. Initial VBG demonstrating pH 7.30/PCO2 68. Patient given 80 lasix IV, duo-nebs, ceftriaxone, and solumedrol. repeat VBG with pH down to 7.27, CO2 76. Patient placed on bipap and MICU consulted for management of severe respiratory acidosis s/o HFE and COPD.            Overview/Hospital Course:  Admitted to Critical Care Medicine for acute respiratory failure requiring continuous BiPap use. Nephrology consult initiated dialysis for volume removal. BiPap weaned to nasal cannula. Started on Ceftriaxone/Metronidazole to address mastoiditis/pneumonia concerns and transitioned to Augmentin(ESRD dosage) to complete 7 day course. Underwent paracentesis for ascites. SAAG <1.1, likely  related to hypoalbuminemia and decompensated heart failure. Cytology -negative for malignancy, tumor markers- non-specific mild elevation. Resumed all home medications for blood pressure controlled. Insulin infusion started for hyperglycemia and was transitioned to basal/prandial injections - further intensified for glycemic control. Stepped down to  6/23/23. Had bleeding from avf after last round of HD, therefore discharge postponed. Eliquis and aspirin held, hemostasis achieved, vascular sx consulted. Monitoring for recurrent bleeding after next round of HD (today).        Interval History: HD today, no bleeding from avf    Review of Systems   Constitutional:  Negative for chills and fever.   Respiratory:  Negative for cough and shortness of breath.    Cardiovascular:  Negative for chest pain.   All other systems reviewed and are negative.  Objective:           Physical Exam  Vitals and nursing note reviewed.   Constitutional:       General: He is not in acute distress.     Appearance: He is not toxic-appearing.   HENT:      Head: Normocephalic and atraumatic.   Eyes:      General: No scleral icterus.     Extraocular Movements: Extraocular movements intact.   Cardiovascular:      Rate and Rhythm: Normal rate and regular rhythm.      Pulses: Normal pulses.   Pulmonary:      Effort: Pulmonary effort is normal. No respiratory distress.   Musculoskeletal:      Right lower leg: No edema.      Left lower leg: No edema.   Skin:     General: Skin is warm.   Neurological:      Mental Status: He is alert. Mental status is at baseline.           Significant Labs: All pertinent labs within the past 24 hours have been reviewed.    Significant Imaging: I have reviewed all pertinent imaging results/findings within the past 24 hours.      Assessment/Plan:      * Acute on chronic respiratory failure with hypoxia and hypercapnia  1. Acute on chronic hypoxemic and hypercapnic respiratory failure- No PFts. Due to chronic HF/ESRD  "with volume overload. Reported h/o COPD. Off NIV with fluid removal with HD, continue supplemental oxygen.    2. HFpEF - volume management with HD, gdmt  3. H/o PE- continue eliquis  - Eliquis held 6/27 due to bleeding avf, resume tomorrow if no further bleeding with HD today.  4. DM2 with hyperglycemia - insulin intensified 6/24, carb restricted diet enforced. Better- no changes to rx today  5. ESRD on HD MWF - per nephro, HD today  6. COPD- no PFTs/historic diagnosis- On LAMA at home. Continue bds  7. HTN - continue meds.  8. Encephalopathy- hypercapnia/metabolic. CT imaging brain without acute findings and exam non-focal.- Resolved.   9. Ascites- SAAG <1.1- "other cells" identified on differential. Not SBP.. Although majority of evidence points to chronic volume overload + hypoalbuminemia-- Follow cytology results and tumor markers - no malignancy on cytology, non-specific tumor marker elevation. Will not pursue additional testing at this time.  10. Mastoiditis- incidentally noted on CT imaging brain. Continue Augmentin Rx x 7 days per ENT.  11. Anemia- Hb stable.   12. Hyperkalemia - resolved with HD, K restricted diet and daily bmp monitoring      AV graft malfunction  Bleeding from avf after HD 6/26  -concern for continued bleeding since dressings are wet with blood, also had transient nosebleed  -aspirin and Eliquis held 6/27   -stable h/h, elevated coags  -Vascular sx consult - US ordered  -monitor for recurrent bleeding after today's HD  -f/u Vascular sx recs        Moderate malnutrition  Nutrition consulted. Most recent weight and BMI monitored-     Measurements:  Wt Readings from Last 1 Encounters:   06/28/23 69 kg (152 lb 1.9 oz)   Body mass index is 22.46 kg/m².    Patient has been screened and assessed by RD.    Malnutrition Type:  Context: chronic illness  Level: moderate    Malnutrition Characteristic Summary:  Weight Loss (Malnutrition): 10% in 6 months  Subcutaneous Fat (Malnutrition): moderate " depletion  Muscle Mass (Malnutrition): moderate depletion    Interventions/Recommendations (treatment strategy):  1. Continue Diabetic diet, add Renal diet restrictions - change ONS to [facility]. 2. RD to monitor & follow-up.    Mastoiditis of left side  augmentin x 7 days      ESRD (end stage renal disease)  Nephrology consulted for HD.        VTE Risk Mitigation (From admission, onward)         Ordered     heparin (porcine) injection 1,000 Units  As needed (PRN)         06/21/23 0602     IP VTE HIGH RISK PATIENT  Once         06/21/23 0222     Place sequential compression device  Until discontinued         06/21/23 0222                Discharge Planning   GERA: 6/29/2023     Code Status: Full Code   Is the patient medically ready for discharge?: No    Reason for patient still in hospital (select all that apply): Patient trending condition  Discharge Plan A: Return to nursing home ([facility]'s (resident))                  Cecy Garcia MD  Department of Hospital Medicine   [facility] - Telemetry Stepdown

## 2023-06-29 NOTE — ASSESSMENT & PLAN NOTE
Nutrition consulted. Most recent weight and BMI monitored-     Measurements:  Wt Readings from Last 1 Encounters:   06/28/23 69 kg (152 lb 1.9 oz)   Body mass index is 22.46 kg/m².    Patient has been screened and assessed by RD.    Malnutrition Type:  Context: chronic illness  Level: moderate    Malnutrition Characteristic Summary:  Weight Loss (Malnutrition): 10% in 6 months  Subcutaneous Fat (Malnutrition): moderate depletion  Muscle Mass (Malnutrition): moderate depletion    Interventions/Recommendations (treatment strategy):  1. Continue Diabetic diet, add Renal diet restrictions - change ONS to [facility]. 2. RD to monitor & follow-up.

## 2023-06-29 NOTE — PROGRESS NOTES
Nick Menjivar - Telemetry [facility]  Progress Note    Patient Name: Cosme Lewis  MRN: [id number]  Patient Class: IP- Inpatient   Admission Date: 6/20/2023  Length of Stay: 8 days  Attending Physician: Jonathan Marie MD  Primary Care Provider: Primary Doctor No        Subjective:     Principal Problem:Acute on chronic respiratory failure with hypoxia and hypercapnia        HPI:  Copied from MICU team who admitted the patient:    Mr. Cosme Lewis is a 56 y/o M with hx of HFpEF (LVEF 55%, Grade II DD on echo 3/20/23), chronic RF on home O2, IDDM2, ESRD on HD, COPD, HTN, muliple PEs on eliquis who presented to the ED from [facility] for evaluation of AMS and hypoxia. He was in his usual state of health until yesterday afternoon when staff noticed he was more lethargic than normal, and was satting in the 80s.      In the ED, patient afebrile, hypertensive to 170s systolic, HR 50s, hypoxic requiring 10L NRB. CBC grossly unchanged from BL with chronic leukopenia and anemia. CMP also grossly unchanged from BL, with known mild hyponatremia. Lactic elevated to 3.0. BNP 3200. Trops wnl. Procal and CRP elevated. CXR demonstrating bilateral edema as well as L pleural effusion. CTH showing L mastoiditis, otherwise no acute intracranial abnormality. Initial VBG demonstrating pH 7.30/PCO2 68. Patient given 80 lasix IV, duo-nebs, ceftriaxone, and solumedrol. repeat VBG with pH down to 7.27, CO2 76. Patient placed on bipap and MICU consulted for management of severe respiratory acidosis s/o HFE and COPD.            Overview/Hospital Course:  Admitted to Critical Care Medicine for acute respiratory failure requiring continuous BiPap use. Nephrology consult initiated dialysis for volume removal. BiPap weaned to nasal cannula. Started on Ceftriaxone/Metronidazole to address mastoiditis/pneumonia concerns and transitioned to Augmentin(ESRD dosage) to complete 7 day course. Underwent paracentesis for ascites. SAAG <1.1, likely  related to hypoalbuminemia and decompensated heart failure. Cytology -negative for malignancy, tumor markers- non-specific mild elevation. Resumed all home medications for blood pressure controlled. Insulin infusion started for hyperglycemia and was transitioned to basal/prandial injections - further intensified for glycemic control. Stepped down to HM 6/23/23. Had bleeding from avf after last round of HD, therefore discharge postponed. Eliquis and aspirin held, hemostasis achieved, vascular sx consulted. Monitoring for recurrent bleeding after next round of HD (today).    6/29 ICU stepdown for acute on chronic hypercapnic respiratory failure, encephalopathy due to adhf, resolved with HD and continuous BIPAP. Remains in the hospital due bleeding from dialysis graft. Held eliquis (PE) and aspirin. Vascular surgery is consulted, US completed - no stenosis/aneurysm.  Bleeding controlled with pressure dressing. vascular sx recs holding ASA and Eliquis. OK for DVT prophylaxis. patient had PE 10/22 Pulmonary thromboembolism within a lobar branch to the right lower lobe noting additional pulmonary thromboembolism within segmental branches to the right upper lobe. on oxygen 3LNC . Plan is to do LUE fistulogram Monday. resumed ASA and eliquis per vascular surgery           Review of Systems:   Pain scale:   Constitutional:  fever,  chills, headache, vision loss, hearing loss, weight loss, Generalized weakness, falls, loss of smell, loss of taste, poor appetite,  sore throat  Respiratory: cough, shortness of breath.   Cardiovascular: chest pain, dizziness, palpitations, orthopnea, swelling of feet, syncope  Gastrointestinal: nausea, vomiting, abdominal pain, diarrhea, black stool,  blood in stool, change in bowel habits  Genitourinary: hematuria, dysuria, urgency, frequency  Integument/Breast: rash,  pruritis  Hematologic/Lymphatic: easy bruising, lymphadenopathy  Musculoskeletal: arthralgias , myalgias, back pain, neck pain,  knee pain  Neurological: confusion, seizures, tremors, slurred speech  Behavioral/Psych:  depression, anxiety, auditory or visual hallucinations     OBJECTIVE:     Physical Exam:  Body mass index is 22.46 kg/m².    Constitutional: Appears well-developed and well-nourished. hard of hearing   Head: Normocephalic and atraumatic.   Neck: Normal range of motion. Neck supple.   Cardiovascular: Normal heart rate.  Regular heart rhythm.  Pulmonary/Chest: Effort normal.   Abdominal: No distension.  No tenderness  Musculoskeletal: Normal range of motion. No edema. LUE AV graft with thrill. no bleed . pressure dressing   Neurological: Alert and oriented to person, place, and time.   Skin: Skin is warm and dry.   Psychiatric: Normal mood and affect. Behavior is normal.                  Vital Signs  Temp: 97.9 °F (36.6 °C) (06/29/23 1614)  Pulse: 72 (06/29/23 1614)  Resp: 16 (06/29/23 1614)  BP: (Abnormal) 151/73 (06/29/23 1614)  SpO2: (Abnormal) 91 % (06/29/23 1614)     24 Hour VS Range    Temp:  [97.7 °F (36.5 °C)-98.7 °F (37.1 °C)]   Pulse:  [72-80]   Resp:  [16-20]   BP: (149-160)/(72-85)   SpO2:  [84 %-100 %]     Intake/Output Summary (Last 24 hours) at 6/29/2023 1825  Last data filed at 6/28/2023 1842  Gross per 24 hour   Intake 300 ml   Output no documentation   Net 300 ml         I/O This Shift:  No intake/output data recorded.    Wt Readings from Last 3 Encounters:   06/28/23 69 kg (152 lb 1.9 oz)   05/09/23 76.2 kg (168 lb)   03/31/23 76.2 kg (168 lb)       I have personally reviewed the vitals and recorded Intake/Output     Laboratory/Diagnostic Data:    CBC/Anemia Labs: Coags:    Recent Labs   Lab 06/27/23  0457 06/27/23  1443 06/28/23  0446 06/29/23  0543   WBC 2.87*  --  3.14* 3.08*   HGB 10.1* 10.9* 10.1* 9.8*   HCT 32.6* 34.5* 31.9* 30.6*     --  150 121*   MCV 95  --  94 93   RDW 13.9  --  13.7 14.0    Recent Labs   Lab 06/27/23  1443   INR 1.4*   APTT 59.3*        Chemistries: ABG:   Recent Labs   Lab  06/27/23  0457 06/28/23  0446 06/29/23  0543   * 133* 134*   K 5.0 4.2 4.8    97 97   CO2 24 25 26   BUN 39* 25* 42*   CREATININE 4.0* 3.0* 4.1*   CALCIUM 7.6* 8.2* 7.6*   PROT 5.9* 6.1 6.1   BILITOT 0.5 0.6 0.5   ALKPHOS 161* 161* 161*   ALT 19 17 18   AST 26 21 26   MG 2.1 1.9 2.1   PHOS 3.2 3.0 3.6    No results for input(s): PH, PCO2, PO2, HCO3, POCSATURATED, BE in the last 168 hours.     POCT Glucose: HbA1c:    Recent Labs   Lab 06/28/23  1121 06/28/23  1713 06/28/23  2034 06/29/23  0838 06/29/23  1144 06/29/23  1614   POCTGLUCOSE 160* 208* 270* 243* 301* 313*    Hemoglobin A1C   Date Value Ref Range Status   06/21/2023 10.7 (H) 4.0 - 5.6 % Final     Comment:     ADA Screening Guidelines:  5.7-6.4%  Consistent with prediabetes  >or=6.5%  Consistent with diabetes    High levels of fetal hemoglobin interfere with the HbA1C  assay. Heterozygous hemoglobin variants (HbS, HgC, etc)do  not significantly interfere with this assay.   However, presence of multiple variants may affect accuracy.     03/06/2023 9.5 (H) 4.0 - 5.6 % Final     Comment:     ADA Screening Guidelines:  5.7-6.4%  Consistent with prediabetes  >or=6.5%  Consistent with diabetes    High levels of fetal hemoglobin interfere with the HbA1C  assay. Heterozygous hemoglobin variants (HbS, HgC, etc)do  not significantly interfere with this assay.   However, presence of multiple variants may affect accuracy.     12/20/2022 7.5 (H) 4.0 - 5.6 % Final     Comment:     ADA Screening Guidelines:  5.7-6.4%  Consistent with prediabetes  >or=6.5%  Consistent with diabetes    High levels of fetal hemoglobin interfere with the HbA1C  assay. Heterozygous hemoglobin variants (HbS, HgC, etc)do  not significantly interfere with this assay.   However, presence of multiple variants may affect accuracy.          Cardiac Enzymes: Ejection Fractions:    No results for input(s): CPK, CPKMB, MB, TROPONINI in the last 72 hours. EF   Date Value Ref Range Status    03/20/2023 55 % Final   11/23/2022 63 % Final          No results for input(s): COLORU, APPEARANCEUA, PHUR, SPECGRAV, PROTEINUA, GLUCUA, KETONESU, BILIRUBINUA, OCCULTUA, NITRITE, UROBILINOGEN, LEUKOCYTESUR, RBCUA, WBCUA, BACTERIA, SQUAMEPITHEL, HYALINECASTS in the last 48 hours.    Invalid input(s): WRIGHTSUR    Procalcitonin (ng/mL)   Date Value   06/20/2023 0.54 (H)   03/17/2023 1.06 (H)   03/10/2023 0.55 (H)   10/12/2022 1.87 (H)     Lactate (Lactic Acid) (mmol/L)   Date Value   06/21/2023 1.2   06/20/2023 3.0 (H)   04/02/2023 1.4   03/10/2023 1.0   10/12/2022 2.0     BNP (pg/mL)   Date Value   06/20/2023 3,281 (H)   06/14/2023 2,762 (H)   03/16/2023 4,802 (H)   03/04/2023 4,520 (H)   02/21/2023 >4,900 (H)     CRP (mg/L)   Date Value   06/20/2023 13.3 (H)   10/13/2022 44.2 (H)   10/11/2022 73.4 (H)     Sed Rate (mm/Hr)   Date Value   10/11/2022 55 (H)     D-Dimer (mg/L FEU)   Date Value   10/12/2022 3.05 (H)     Ferritin (ng/mL)   Date Value   02/23/2023 1,803 (H)     No results found for: LDH  Troponin I (ng/mL)   Date Value   06/21/2023 0.031 (H)   06/21/2023 0.034 (H)   06/20/2023 0.028 (H)   06/20/2023 0.026   06/14/2023 0.038 (H)   03/16/2023 0.033 (H)   03/05/2023 0.037 (H)   03/05/2023 0.035 (H)     CPK (U/L)   Date Value   10/13/2022 134     No results found for this or any previous visit.  SARS-CoV2 (COVID-19) Qualitative PCR (no units)   Date Value   03/31/2023 Not Detected   03/29/2023 Not Detected   03/04/2023 Not Detected   01/06/2023 Not Detected   11/18/2022 Not Detected       Microbiology labs for the last week  Microbiology Results (last 7 days)       Procedure Component Value Units Date/Time    (rule out SBP) Culture, Anaerobic [[id number]] Collected: 06/21/23 0554    Order Status: Completed Specimen: Ascites Updated: 06/28/23 0849     Anaerobic Culture No anaerobes isolated    Narrative:      To rule out SBP order labs: Aerobic culture [[id number]],  Culture, Anaerobic [[id number]], Gram stain  [[id number]], Albumin  [[id number]], Protein [[id number]], LDH [[id number]], WBC \T\ Dff  [BLP6789].    Blood Culture #2 **CANNOT BE ORDERED STAT** [[id number]] Collected: 06/21/23 0237    Order Status: Completed Specimen: Blood from Peripheral, Antecubital, Left Updated: 06/26/23 0612     Blood Culture, Routine No growth after 5 days.    Blood Culture #1 **CANNOT BE ORDERED STAT** [[id number]] Collected: 06/21/23 0238    Order Status: Completed Specimen: Blood from Peripheral, Upper Arm, Right Updated: 06/26/23 0612     Blood Culture, Routine No growth after 5 days.    (rule out SBP) Aerobic culture [[id number]] Collected: 06/21/23 0554    Order Status: Completed Specimen: Ascites Updated: 06/24/23 1148     Aerobic Bacterial Culture No growth    Narrative:      To rule out SBP order labs: Aerobic culture [[id number]],  Culture, Anaerobic [[id number]], Gram stain [[id number]], Albumin  [QLL024], Protein [[id number]], LDH [HVZ094], WBC \T\ Dff  [[id number]].            Reviewed and noted in plan where applicable- Please see chart for full lab data.    Lines/Drains:       Peripheral IV - Single Lumen 06/26/23 1732 20 G Distal;Posterior;Right Forearm (Active)   Site Assessment Clean;Dry;Intact;No redness;No swelling 06/28/23 2010   Line Status Saline locked 06/28/23 2010   Dressing Status Clean;Dry;Intact 06/28/23 1557   Dressing Intervention Integrity maintained 06/28/23 1557   Number of days: 2            Hemodialysis AV Fistula Left upper arm (Active)   Needle Size 15ga 06/27/23 2050   Site Assessment Bleeding 06/28/23 0802   Patency Present;Thrill;Bruit 06/28/23 0802   Status Deaccessed 06/28/23 0802   Flows Good 06/27/23 2050   Dressing Intervention Sterile dressing change 06/28/23 0802   Dressing Status Clean;Dry;Intact 06/27/23 2050   Site Condition Bleeding;Drainage 06/28/23 0802   Dressing Gauze 06/27/23 2050   Number of days:        Imaging  ECG Results              EKG 12-lead (Final result)  Result time 06/21/23 08:42:19      Final result by  Interface, Lab In [facility] (06/21/23 08:42:19)               Narrative:    Test Reason : R41.82,    Vent. Rate : 057 BPM     Atrial Rate : 057 BPM     P-R Int : 266 ms          QRS Dur : 110 ms      QT Int : 534 ms       P-R-T Axes : 041 106 069 degrees     QTc Int : 519 ms    Sinus bradycardia with 1st degree A-V block  Rightward axis  Prolonged QT  Nonspecific T wave abnormality  Abnormal ECG  When compared with ECG of 14-JUN-2023 14:57,  Nonspecific T wave abnormality, worse in Anterior leads  Confirmed by JAZMINE SEWELL MD ([id number]) on 6/21/2023 8:42:12 AM    Referred By: [email or web address]   SELF           Confirmed By:JAZMINE SEWELL MD                                  Results for orders placed during the hospital encounter of 03/16/23    Echo    Interpretation Summary  · The left ventricle is normal in size with normal systolic function.  · The estimated ejection fraction is 55%.  · Grade II left ventricular diastolic dysfunction.  · Moderate right ventricular enlargement with moderately reduced right ventricular systolic function.  · Biatrial enlargement.  · Interatrial septum bows to left, consider elevated right atrial pressure.  · Mild tricuspid regurgitation.  · The estimated PA systolic pressure is 54 mmHg.  · There is pulmonary hypertension.  · Elevated central venous pressure (15 mmHg).  · Small circumferential pericardial effusion.  · There are bilateral pleural effusions.  · There is ascites present.      [facility] Hemodialysis Access  Indication  ========    End Stage Renal Disease on Dialysis    Dialysis Access  =============    Dialysis access location:  Left Arm  Type of AV access: Brachiobasilic AVF  Lt inflow A PSV    410 cm/s  Lt at anastomosis PSV  528 cm/s  Lt A distal anastomosis PSV    419 cm/s  Lt fistula prox PSV    117 cm/s  Lt fistula mid  cm/s  Lt fistula mid flow volume 3,394 ml/min  Lt fistula distal PSV  162 cm/s  Lt outflow V prox PSV  104 cm/s  Lt outflow V mid PSV   52 cm/s  Lt outflow V  distal PSV    66 cm/s    Impression  =========    Color flow duplex exam reveals a patent, tortuous and aneurysmal left Brachiobasilic AV fistula with no evidence of a  hemodynamically significant stenosis despite slightly elevated velocities distal to the anastomosis, this area appears patent (smaller  in caliber) without a focal narrowing. Volume flow at mid bicep level measures 3394 ml/min.    DATE OF SERVICE: 06/28/2023                                                      Sonographer: Laura Randhawa  Electronically Signed by: Lul Rojo M.D. at 06/28/2023-08:21      Labs, Imaging, EKG and Diagnostic results from 6/29/2023 were reviewed.    Medications:  Medication list was reviewed and changes noted under Assessment/Plan.  No current facility-administered medications on file prior to encounter.     Current Outpatient Medications on File Prior to Encounter   Medication Sig Dispense Refill    acetaminophen (TYLENOL) 650 MG TbSR Take 650 mg by mouth every 8 (eight) hours as needed (pain or fever over 100.4).      albuterol (PROVENTIL/VENTOLIN HFA) 90 mcg/actuation inhaler Inhale 2 puffs into the lungs 4 (four) times daily as needed (breathing).      albuterol-ipratropium (DUO-NEB) 2.5 mg-0.5 mg/3 mL nebulizer solution Take 3 mLs by nebulization every 4 (four) hours while awake. Rescue 6480 mL 0    apixaban (ELIQUIS) 5 mg Tab Take 5 mg by mouth 2 (two) times daily.      artificial tears (ISOPTO TEARS) 0.5 % ophthalmic solution Place 1 drop into both eyes 6 (six) times daily. 15 mL 0    aspirin (ECOTRIN) 81 MG EC tablet Take 81 mg by mouth once daily.      atorvastatin (LIPITOR) 40 MG tablet Take 1 tablet (40 mg total) by mouth once daily. (Patient taking differently: Take 40 mg by mouth every evening.) 90 tablet 3    calcium acetate,phosphat bind, (PHOSLO) 667 mg capsule Take 667 mg by mouth 3 (three) times daily.      carvediloL (COREG) 6.25 MG tablet Take 6.25 mg by mouth 2 (two) times daily.      cetirizine  (ZYRTEC) 10 MG tablet Take 10 mg by mouth daily as needed (itching).      cloNIDine (CATAPRES) 0.1 MG tablet Take 0.1 mg by mouth.      cloNIDine 0.3 mg/24 hr td ptwk (CATAPRES) 0.3 mg/24 hr APPLY ONE PATCH TOPICALLY EVERY WEEK FOR HIGH BLOOD PRESSURE      doxycycline (VIBRAMYCIN) 100 MG Cap Take 100 mg by mouth 2 (two) times daily.      epoetin xavier (PROCRIT) 10,000 unit/mL injection Inject 0.7 mLs (7,000 Units total) into the skin every Tues, Thurs, Sat.      erythromycin (ROMYCIN) ophthalmic ointment Place a 1/2 inch ribbon of ointment into the lower eyelid three times a day. (Patient taking differently: Place a 1/2 inch ribbon of ointment into the lower right eyelid three times a day.) 3.5 g 0    FLUoxetine 40 MG capsule Take 40 mg by mouth once daily.      fluticasone-salmeterol diskus inhaler 250-50 mcg Inhale 1 puff into the lungs 2 (two) times daily.      GLUCAGON EMERGENCY KIT, HUMAN, 1 mg injection 1 mg into the muscle as needed if CBG < 70      HYDROPHILIC CREAM TOP Apply liberal amount to affected area twice daily for dry skin      insulin detemir U-100, Levemir, 100 unit/mL (3 mL) SubQ InPn pen Inject 8 Units into the skin once daily. 7.2 mL 3    insulin detemir U-100, Levemir, 100 unit/mL (3 mL) SubQ InPn pen Inject 4 Units into the skin every evening. 3.6 mL 3    insulin lispro (HUMALOG KWIKPEN INSULIN) 100 unit/mL pen Inject 8 Units into the skin 3 (three) times daily before meals. 6 mL 11    isosorbide mononitrate (IMDUR) 30 MG 24 hr tablet Take 1 tablet (30 mg total) by mouth once daily. 90 tablet 3    lisinopriL (PRINIVIL,ZESTRIL) 40 MG tablet Take 1 tablet (40 mg total) by mouth every evening. 90 tablet 3    melatonin 3 mg TbDL Take 9 mg by mouth nightly as needed (sleep).      NIFEdipine (PROCARDIA-XL) 60 MG (OSM) 24 hr tablet Take 1 tablet (60 mg total) by mouth 2 (two) times a day. 60 tablet 11    nut.tx.imp.renal fxn,lac-reduc (NEPRO CARB STEADY ORAL) Give 1 carton by mouth with each meal.       ondansetron (ZOFRAN) 8 MG tablet Take 1 tablet by mouth 3 (three) times daily as needed for Nausea.      sevelamer carbonate (RENVELA) 800 mg Tab Take 800 mg by mouth 3 (three) times daily.      sodium chloride (SALINE NASAL) 0.65 % nasal spray 1 spray by Nasal route as needed (dry nostril). 30 mL 12    tiotropium bromide (SPIRIVA RESPIMAT) 2.5 mcg/actuation inhaler Inhale 2 puffs into the lungs Daily.      triamcinolone acetonide 0.025% (KENALOG) 0.025 % cream Apply topically 2 (two) times daily.      vitamin renal formula, B-complex-vitamin c-folic acid, (NEPHROCAP) 1 mg Cap Take 1 capsule by mouth once daily.      vits A and D-white pet-lanolin ointment Apply topically 2 (two) times daily. Apply twice daily to penis 15 g 0    white petrolatum (VASELINE) ointment Apply topically once daily. Apply small amount to both nostrils daily 5 g 0     Scheduled Medications:  [START ON 6/30/2023] sodium chloride 0.9%, , Intravenous, Once  apixaban, 5 mg, Oral, BID  artificial tears, 1 drop, Both Eyes, 6x Daily  [START ON 6/30/2023] aspirin, 81 mg, Oral, Daily  atorvastatin, 40 mg, Oral, Daily  carvediloL, 6.25 mg, Oral, BID  erythromycin, , Both Eyes, Q8H  FLUoxetine, 40 mg, Oral, Daily  fluticasone furoate-vilanteroL, 1 puff, Inhalation, Daily  insulin aspart U-100, 6 Units, Subcutaneous, TIDWM  insulin detemir U-100 (Levemir), 6 Units, Subcutaneous, BID  isosorbide mononitrate, 30 mg, Oral, Daily  lisinopriL, 40 mg, Oral, QHS  NIFEdipine, 60 mg, Oral, BID  sevelamer carbonate, 800 mg, Oral, TID  tiotropium bromide, 2 puff, Inhalation, Daily      PRN: albuterol-ipratropium, cetirizine, dextrose 10%, dextrose 10%, dextrose, dextrose, glucagon (human recombinant), heparin (porcine), insulin aspart U-100, melatonin, sodium chloride 0.9%, sodium chloride 0.9%, sodium chloride 0.9%  Infusions:   Estimated Creatinine Clearance: 18.9 mL/min (A) (based on SCr of 4.1 mg/dL (H)).    Assessment/Plan:      * Acute on chronic  respiratory failure with hypoxia and hypercapnia  Acute on chronic hypoxemic and hypercapnic respiratory failure- No PFts. Due to chronic HF/ESRD with volume overload. Reported h/o COPD. Off NIV with fluid removal with HD, continue supplemental oxygen.     6/20 on 4lnc     End-stage renal disease on hemodialysis    As above    AV graft malfunction  Bleeding from avf after HD 6/26  -concern for continued bleeding since dressings are wet with blood, also had transient nosebleed  -aspirin and Eliquis held 6/27   -stable h/h, elevated coags  -Vascular sx consult - US ordered  -monitor for recurrent bleeding after today's HD  -f/u Vascular sx recs    6/29 resumed ASA and eliquis per vascular surgery         Moderate malnutrition  Nutrition consulted. Most recent weight and BMI monitored-     Measurements:  Wt Readings from Last 1 Encounters:   06/28/23 69 kg (152 lb 1.9 oz)   Body mass index is 22.46 kg/m².    Patient has been screened and assessed by RD.    Malnutrition Type:  Context: chronic illness  Level: moderate    Malnutrition Characteristic Summary:  Weight Loss (Malnutrition): 10% in 6 months  Subcutaneous Fat (Malnutrition): moderate depletion  Muscle Mass (Malnutrition): moderate depletion    Interventions/Recommendations (treatment strategy):  1. Continue Diabetic diet, add Renal diet restrictions - change ONS to [facility]. 2. RD to monitor & follow-up.    Mastoiditis of left side  augmentin x 7 days      Acute on chronic diastolic heart failure  HFpEF - volume management with HD, gdmt         Hypertension    Chronic, controlled.  Latest blood pressure and vitals reviewed-   Temp:  [97.7 °F (36.5 °C)-98.7 °F (37.1 °C)]   Pulse:  [65-80]   Resp:  [18-20]   BP: (140-160)/(66-85)   SpO2:  [84 %-100 %] .   Home meds for hypertension were reviewed and noted below. Hospital anti-hypertensive changes were made as shown below.  Hypertension Medications               carvediloL (COREG) 6.25 MG tablet Take 6.25 mg by  mouth 2 (two) times daily.    isosorbide mononitrate (IMDUR) 30 MG 24 hr tablet Take 1 tablet (30 mg total) by mouth once daily.   PRN meds if BP> 180/110 mm HG. continue lisinopril  and procardia    COPD (chronic obstructive pulmonary disease)     Reported h/o COPD. Off NIV with fluid removal with HD, continue supplemental oxygen.          Type 2 diabetes mellitus with hyperglycemia, with long-term current use of insulin  Patient's FSGs are controlled on current hypoglycemics.   Last A1c reviewed-   Lab Results   Component Value Date    HGBA1C 10.7 (H) 06/21/2023    HGBA1C 9.5 (H) 03/06/2023    HGBA1C 7.5 (H) 12/20/2022     Will hold PO hypoglycemics and will start correctional scale insulin  Most recent fingerstick glucose reviewed-   Recent Labs   Lab 06/28/23  2034 06/29/23  0838 06/29/23  1144 06/29/23  1614   POCTGLUCOSE 270* 243* 301* 313*     currently on   Antihyperglycemics (From admission, onward)      Start     Stop Route Frequency Ordered    06/25/23 0715  insulin aspart U-100 pen 6 Units         -- SubQ 3 times daily with meals 06/24/23 1826    06/24/23 2100  insulin detemir U-100 (Levemir) pen 6 Units         -- SubQ 2 times daily 06/24/23 1827    06/24/23 0345  insulin aspart U-100 pen 1-10 Units         -- SubQ Every 4 hours PRN 06/24/23 0240            Malnutrition of mild degree    Nutrition consulted. Most recent weight and BMI monitored-      Measurements:      Wt Readings from Last 1 Encounters:   06/28/23 69 kg (152 lb 1.9 oz)   Body mass index is 22.46 kg/m².     Patient has been screened and assessed by RD.     Malnutrition Type:  Context: chronic illness  Level: moderate     Malnutrition Characteristic Summary:  Weight Loss (Malnutrition): 10% in 6 months  Subcutaneous Fat (Malnutrition): moderate depletion  Muscle Mass (Malnutrition): moderate depletion     Interventions/Recommendations (treatment strategy):  1. Continue Diabetic diet, add Renal diet restrictions - change ONS to  [employer]. 2. RD to monitor & follow-up.        HLD (hyperlipidemia)  on lipitor        Anemia in ESRD (end-stage renal disease)    Patient's with Normocytic anemia.. Hemoglobin stable. Etiology likely due to chronic disease .  Current CBC reviewed-  Recent Labs   Lab 06/27/23  1443 06/28/23  0446 06/29/23  0543   HGB 10.9* 10.1* 9.8*    Monitor CBC and transfuse if H/H drops below 7/21.     Multiple subsegmental pulmonary emboli without acute cor pulmonale  H/o PE- continue eliquis  - Eliquis held 6/27 due to bleeding avf, resume tomorrow if no further bleeding with HD today.       Chronic kidney disease-mineral and bone disorder        Chronic hypoxemic respiratory failure  as above   6/29 sats 84% on 4LNC.    Diabetes mellitus with chronic kidney disease on chronic dialysis  continue HD per nephro    ESRD (end stage renal disease)  Nephrology consulted for HD.        VTE Risk Mitigation (From admission, onward)           Ordered     apixaban tablet 5 mg  2 times daily         06/29/23 1553     heparin (porcine) injection 1,000 Units  As needed (PRN)         06/21/23 0602     IP VTE HIGH RISK PATIENT  Once         06/21/23 0222     Place sequential compression device  Until discontinued         06/21/23 0222                    Discharge Planning   GERA: 6/30/2023     Code Status: Full Code   Is the patient medically ready for discharge?: No    Reason for patient still in hospital (select all that apply): Treatment  Discharge Plan A: Return to nursing home ([facility] (resident))                  Jonathan Marie MD  Department of Hospital Medicine   [facility] - Telemetry Stepdown

## 2023-06-29 NOTE — ASSESSMENT & PLAN NOTE
Patient's with Normocytic anemia.. Hemoglobin stable. Etiology likely due to chronic disease .  Current CBC reviewed-  Recent Labs   Lab 06/27/23  1443 06/28/23  0446 06/29/23  0543   HGB 10.9* 10.1* 9.8*    Monitor CBC and transfuse if H/H drops below 7/21.

## 2023-06-29 NOTE — ASSESSMENT & PLAN NOTE
Nutrition consulted. Most recent weight and BMI monitored-     Measurements:  Wt Readings from Last 1 Encounters:   06/28/23 69 kg (152 lb 1.9 oz)   Body mass index is 22.46 kg/m².    Patient has been screened and assessed by RD.    Malnutrition Type:  Context: chronic illness  Level: moderate    Malnutrition Characteristic Summary:  Weight Loss (Malnutrition): 10% in 6 months  Subcutaneous Fat (Malnutrition): moderate depletion  Muscle Mass (Malnutrition): moderate depletion    Interventions/Recommendations (treatment strategy):  1. Continue Diabetic diet, add Renal diet restrictions - change ONS to [facility]. 2. RD to monitor & follow-up.

## 2023-06-29 NOTE — ASSESSMENT & PLAN NOTE
"1. Acute on chronic hypoxemic and hypercapnic respiratory failure- No PFts. Due to chronic HF/ESRD with volume overload. Reported h/o COPD. Off NIV with fluid removal with HD, continue supplemental oxygen.    2. HFpEF - volume management with HD, gdmt  3. H/o PE- continue eliquis  - Eliquis held 6/27 due to bleeding avf, resume tomorrow if no further bleeding with HD today.  4. DM2 with hyperglycemia - insulin intensified 6/24, carb restricted diet enforced. Better- no changes to rx today  5. ESRD on HD MWF - per nephro, HD today  6. COPD- no PFTs/historic diagnosis- On LAMA at home. Continue bds  7. HTN - continue meds.  8. Encephalopathy- hypercapnia/metabolic. CT imaging brain without acute findings and exam non-focal.- Resolved.   9. Ascites- SAAG <1.1- "other cells" identified on differential. Not SBP.. Although majority of evidence points to chronic volume overload + hypoalbuminemia-- Follow cytology results and tumor markers - no malignancy on cytology, non-specific tumor marker elevation. Will not pursue additional testing at this time.  10. Mastoiditis- incidentally noted on CT imaging brain. Continue Augmentin Rx x 7 days per ENT.  11. Anemia- Hb stable.   12. Hyperkalemia - resolved with HD, K restricted diet and daily bmp monitoring    "

## 2023-06-29 NOTE — ASSESSMENT & PLAN NOTE
H/o PE- continue eliquis  - Eliquis held 6/27 due to bleeding avf, resume tomorrow if no further bleeding with HD today.

## 2023-06-29 NOTE — SUBJECTIVE & OBJECTIVE
Interval History: HD today, no bleeding from avf    Review of Systems   Constitutional:  Negative for chills and fever.   Respiratory:  Negative for cough and shortness of breath.    Cardiovascular:  Negative for chest pain.   All other systems reviewed and are negative.  Objective:           Physical Exam  Vitals and nursing note reviewed.   Constitutional:       General: He is not in acute distress.     Appearance: He is not toxic-appearing.   HENT:      Head: Normocephalic and atraumatic.   Eyes:      General: No scleral icterus.     Extraocular Movements: Extraocular movements intact.   Cardiovascular:      Rate and Rhythm: Normal rate and regular rhythm.      Pulses: Normal pulses.   Pulmonary:      Effort: Pulmonary effort is normal. No respiratory distress.   Musculoskeletal:      Right lower leg: No edema.      Left lower leg: No edema.   Skin:     General: Skin is warm.   Neurological:      Mental Status: He is alert. Mental status is at baseline.           Significant Labs: All pertinent labs within the past 24 hours have been reviewed.    Significant Imaging: I have reviewed all pertinent imaging results/findings within the past 24 hours.

## 2023-06-30 NOTE — NURSING
Pt arrived from dialysis via stretcher. Assessed BG at 1250, 63. Administered dextrose 40% gel 15,000mg PO. Reassessed BG at 1338, 98. Monitoring ongoing.

## 2023-06-30 NOTE — ASSESSMENT & PLAN NOTE
BG goal: 140-180   T2DM BG variable here. ESRD  Prandial elevations noted yesterday.  Will increase mealtime insulin and monitor    - -Continue Levemir 6 units BID   - Increase Novolog to 8 units AC.  - POCT Glucose before meals and at bedtime  - Hypoglycemia protocol in place      ** Please notify Endocrine for any change and/or advance in diet**  ** Please call Endocrine for any BG related issues **     Discharge Planning:   TBD. Please notify endocrinology prior to discharge.

## 2023-06-30 NOTE — PROGRESS NOTES
[facility]FF HEMODIALYSIS NOTE     Patient currently on hemodialysis for removal of uremic toxins and volume.     Patient seen and evaluated on hemodialysis, tolerating treatment, see HD flowsheet for vitals and assessments.    Labs have been reviewed and the dialysate bath has been adjusted.       Assessment/Plan:    -ESRD on HD   -Patient seen on HD, tolerating treatment well, w/o complaints   -UF goal of 3L   -Renal diet, if not NPO   -Strict I/O's and daily weights  -Daily renal function panels  -Keep MAP >65 while on HD   -Maintain Hgb >7.0  -Continue renvela   -Will continue to follow while inpatient       Abdulaziz Forbes, NP  Nephrology

## 2023-06-30 NOTE — ASSESSMENT & PLAN NOTE
Bleeding from avf after HD 6/26  -concern for continued bleeding since dressings are wet with blood, also had transient nosebleed  -aspirin and Eliquis held 6/27   -stable h/h, elevated coags  -Vascular sx consult - US ordered  -monitor for recurrent bleeding after today's HD  -f/u Vascular sx recs    6/29 resumed ASA and eliquis per vascular surgery   6/30 HB stable on eliquis. discharge to NH with vasular sx follow up for LUE fistulogram Monday.  no need to hold ASA and eliquis per vascular surgery

## 2023-06-30 NOTE — SUBJECTIVE & OBJECTIVE
Interval HPI:   No acute events overnight. Patient in room 8084/8084 A. Blood glucose worsening. BG above goal on current insulin regimen (SSI, prandial, and basal insulin ). Steroid use- None .      Renal function- Normal   Vasopressors-  None    Diet diabetic [facility]; 2000 Calorie; Consistent Carbohydrate, Low Potassium; Standard Tray     Eatin%  Nausea: No  Hypoglycemia and intervention: No  Fever: No  TPN and/or TF: No    PMH, PSH, FH, SH updated and reviewed     ROS:    Review of Systems   Constitutional:  Negative for unexpected weight change.   Eyes:  Negative for visual disturbance.   Respiratory:  Negative for cough.    Cardiovascular:  Negative for chest pain.   Gastrointestinal:  Negative for nausea and vomiting.   Endocrine: Negative for polydipsia and polyuria.   Musculoskeletal:  Negative for back pain.   Skin:  Negative for rash.   Neurological:  Negative for syncope.   Psychiatric/Behavioral:  Negative for agitation and dysphoric mood.      Current Medications and/or Treatments Impacting Glycemic Control  Immunotherapy:    Immunosuppressants       None          Steroids:   Hormones (From admission, onward)      Start     Stop Route Frequency Ordered    23 0406  melatonin tablet 3 mg         -- Oral Nightly PRN 23 0307          Pressors:    Autonomic Drugs (From admission, onward)      None          Hyperglycemia/Diabetes Medications:   Antihyperglycemics (From admission, onward)      Start     Stop Route Frequency Ordered    23 1130  insulin aspart U-100 pen 8 Units         -- SubQ 3 times daily with meals 23 0906    23 1005  insulin aspart U-100 pen 1-10 Units         -- SubQ Before meals, nightly and at 0200 PRN 23 0906    23 2100  insulin detemir U-100 (Levemir) pen 6 Units         -- SubQ 2 times daily 23 1827             PHYSICAL EXAMINATION:  Vitals:    23 0845   BP: (!) 166/83   Pulse: 79   Resp:    Temp:      Body mass  index is 22.46 kg/m².     Physical Exam  Constitutional:       General: He is not in acute distress.     Appearance: Normal appearance. He is not ill-appearing.   HENT:      Head: Normocephalic and atraumatic.      Right Ear: External ear normal.      Left Ear: External ear normal.      Nose: Nose normal.   Cardiovascular:      Rate and Rhythm: Normal rate and regular rhythm.      Heart sounds: No murmur heard.  Pulmonary:      Effort: Pulmonary effort is normal. No respiratory distress.   Abdominal:      General: Bowel sounds are normal. There is no distension.      Tenderness: There is no abdominal tenderness.   Musculoskeletal:         General: No swelling.      Right lower leg: No edema.      Left lower leg: No edema.   Skin:     Findings: No erythema.   Neurological:      General: No focal deficit present.      Mental Status: He is alert and oriented to person, place, and time.   Psychiatric:         Mood and Affect: Mood normal.         Behavior: Behavior normal.

## 2023-06-30 NOTE — NURSING
Pt left unit via wheelchair with hospital transport to dialysis. Ambulated to wheelchair x2 assist. SOB noted. 4L O2 applied.

## 2023-06-30 NOTE — PLAN OF CARE
CM informed per MD that Mr. Lewis is expected to be stable for discharge this afternoon after receiving dialysis. NH orders requested. Call placed to Ngozi at [facility] ( [phone]) to inform her of above conversation. Left voicemail requesting call back, will continue to follow.    11:25AM  NH orders and discharge summary sent to [facility] via [facility].    15:30PM  Ambulance transportation arranged for an estimated pick-up time of 4:30PM. Patient will be discharging to  [address] Nurse to call report to [phone].    Abby Estrella RN  Ext [phone]

## 2023-06-30 NOTE — PT/OT/SLP PROGRESS
Occupational Therapy      Patient Name:  Cosme Lewis   MRN:  [id number]    Patient not seen today secondary to dialysis x 2 trials and third attempt RN not available. Will follow-up within POC.    6/30/2023

## 2023-06-30 NOTE — DISCHARGE SUMMARY
Nick Menjivar - Telemetry [facility]  Discharge Summary      Patient Name: Cosme Lewis  MRN: [id number]  MARCY: [id number]  Patient Class: IP- Inpatient  Admission Date: 6/20/2023  Hospital Length of Stay: 9 days  Discharge Date and Time:  06/30/2023 9:14 AM  Attending Physician: Jonathan Marie MD   Discharging Provider: Jonathan Marie MD  Primary Care Provider: Primary Doctor [facility] Medicine Team: [facility] MED R Jonathan Marie MD  Primary Care Team: [facility]OS[facility] MED R    HPI:   Copied from MICU team who admitted the patient:    Mr. Cosme Lewis is a 58 y/o M with hx of HFpEF (LVEF 55%, Grade II DD on echo 3/20/23), chronic RF on home O2, IDDM2, ESRD on HD, COPD, HTN, muliple PEs on eliquis who presented to the ED from [facility] for evaluation of AMS and hypoxia. He was in his usual state of health until yesterday afternoon when staff noticed he was more lethargic than normal, and was satting in the 80s.      In the ED, patient afebrile, hypertensive to 170s systolic, HR 50s, hypoxic requiring 10L NRB. CBC grossly unchanged from BL with chronic leukopenia and anemia. CMP also grossly unchanged from BL, with known mild hyponatremia. Lactic elevated to 3.0. BNP 3200. Trops wnl. Procal and CRP elevated. CXR demonstrating bilateral edema as well as L pleural effusion. CTH showing L mastoiditis, otherwise no acute intracranial abnormality. Initial VBG demonstrating pH 7.30/PCO2 68. Patient given 80 lasix IV, duo-nebs, ceftriaxone, and solumedrol. repeat VBG with pH down to 7.27, CO2 76. Patient placed on bipap and MICU consulted for management of severe respiratory acidosis s/o HFE and COPD.            * No surgery found *      Hospital Course:   Admitted to Critical Care Medicine for acute respiratory failure requiring continuous BiPap use. Nephrology consult initiated dialysis for volume removal. BiPap weaned to nasal cannula. Started on Ceftriaxone/Metronidazole to address mastoiditis/pneumonia  concerns and transitioned to Augmentin(ESRD dosage) to complete 7 day course. Underwent paracentesis for ascites. SAAG <1.1, likely related to hypoalbuminemia and decompensated heart failure. Cytology -negative for malignancy, tumor markers- non-specific mild elevation. Resumed all home medications for blood pressure controlled. Insulin infusion started for hyperglycemia and was transitioned to basal/prandial injections - further intensified for glycemic control. Stepped down to  6/23/23. Had bleeding from avf after last round of HD, therefore discharge postponed. Eliquis and aspirin held, hemostasis achieved, vascular sx consulted. Monitoring for recurrent bleeding after next round of HD (today).    6/29 ICU stepdown for acute on chronic hypercapnic respiratory failure, encephalopathy due to adhf, resolved with HD and continuous BIPAP. Remains in the hospital due bleeding from dialysis graft. Held eliquis (PE) and aspirin. Vascular surgery is consulted, US completed - no stenosis/aneurysm.  Bleeding controlled with pressure dressing. vascular sx recs holding ASA and Eliquis. OK for DVT prophylaxis. patient had PE 10/22 Pulmonary thromboembolism within a lobar branch to the right lower lobe noting additional pulmonary thromboembolism within segmental branches to the right upper lobe. on oxygen 3LNC . Plan is to do LUE fistulogram Monday. resumed ASA and eliquis per vascular surgery   6/30 HB stable on eliquis. discharge to NH with vasular sx follow up for LUE fistulogram Monday.  no need to hold ASA and eliquis per vascular surgery .  scheduled for 9 AM fistulogram on 7/3/23  he will need to arrive at 7 AM  to cath lab on 3rd floor. NPO midnight.        Goals of Care Treatment Preferences:  Code Status: Full Code      Consults:   Consults (From admission, onward)          Status Ordering Provider     Inpatient consult to Endocrinology  Once        Provider:  (Not yet assigned)    Acknowledged KIRA ESPINAL      Inpatient consult to Vascular Surgery  Once        Provider:  (Not yet assigned)    Completed RENEE HUERTAS     Inpatient consult to Registered Dietitian/Nutritionist  Once        Provider:  (Not yet assigned)    Completed ML RAMOS     Inpatient consult to Nephrology  Once        Provider:  (Not yet assigned)    Completed LEI CHI     Inpatient consult to Critical Care Medicine  Once        Provider:  (Not yet assigned)    Completed KALEY MCCAULEY     Inpatient consult to Nephrology  Once        Provider:  (Not yet assigned)    Completed KALEY MCCAULEY            Assessment/Plan:      * Acute on chronic respiratory failure with hypoxia and hypercapnia  Acute on chronic hypoxemic and hypercapnic respiratory failure- No PFts. Due to chronic HF/ESRD with volume overload. Reported h/o COPD. Off NIV with fluid removal with HD, continue supplemental oxygen.     6/20 on 4lnc      End-stage renal disease on hemodialysis     As above     AV graft malfunction  Bleeding from avf after HD 6/26  -concern for continued bleeding since dressings are wet with blood, also had transient nosebleed  -aspirin and Eliquis held 6/27   -stable h/h, elevated coags  -Vascular sx consult - US ordered  -monitor for recurrent bleeding after today's HD  -f/u Vascular sx recs     6/29 resumed ASA and eliquis per vascular surgery   6/30 HB stable on eliquis. discharge to NH with vasular sx follow up for LUE fistulogram Monday.  no need to hold ASA and eliquis per vascular surgery            Moderate malnutrition  Nutrition consulted. Most recent weight and BMI monitored-      Measurements:      Wt Readings from Last 1 Encounters:   06/28/23 69 kg (152 lb 1.9 oz)   Body mass index is 22.46 kg/m².     Patient has been screened and assessed by RD.     Malnutrition Type:  Context: chronic illness  Level: moderate     Malnutrition Characteristic Summary:  Weight Loss (Malnutrition): 10% in 6 months  Subcutaneous Fat  (Malnutrition): moderate depletion  Muscle Mass (Malnutrition): moderate depletion     Interventions/Recommendations (treatment strategy):  1. Continue Diabetic diet, add Renal diet restrictions - change ONS to [facility]. 2. RD to monitor & follow-up.     Mastoiditis of left side  augmentin x 7 days        Acute on chronic diastolic heart failure  HFpEF - volume management with HD, gdmt           Hypertension     Chronic, controlled.  Latest blood pressure and vitals reviewed-   Temp:  [97.7 °F (36.5 °C)-98.7 °F (37.1 °C)]   Pulse:  [65-80]   Resp:  [18-20]   BP: (140-160)/(66-85)   SpO2:  [84 %-100 %] .   Home meds for hypertension were reviewed and noted below. Hospital anti-hypertensive changes were made as shown below.  Hypertension Medications                    carvediloL (COREG) 6.25 MG tablet Take 6.25 mg by mouth 2 (two) times daily.     isosorbide mononitrate (IMDUR) 30 MG 24 hr tablet Take 1 tablet (30 mg total) by mouth once daily.   PRN meds if BP> 180/110 mm HG. continue lisinopril  and procardia     COPD (chronic obstructive pulmonary disease)      Reported h/o COPD. Off NIV with fluid removal with HD, continue supplemental oxygen.           Type 2 diabetes mellitus with hyperglycemia, with long-term current use of insulin  Patient's FSGs are controlled on current hypoglycemics.   Last A1c reviewed-         Lab Results   Component Value Date     HGBA1C 10.7 (H) 06/21/2023     HGBA1C 9.5 (H) 03/06/2023     HGBA1C 7.5 (H) 12/20/2022      Will hold PO hypoglycemics and will start correctional scale insulin  Most recent fingerstick glucose reviewed-          Recent Labs   Lab 06/28/23  2034 06/29/23  0838 06/29/23  1144 06/29/23  1614   POCTGLUCOSE 270* 243* 301* 313*      currently on   Antihyperglycemics (From admission, onward)        Start     Stop Route Frequency Ordered     06/25/23 0715   insulin aspart U-100 pen 6 Units         -- SubQ 3 times daily with meals 06/24/23 1826     06/24/23 2100    insulin detemir U-100 (Levemir) pen 6 Units         -- SubQ 2 times daily 06/24/23 1827     06/24/23 0345   insulin aspart U-100 pen 1-10 Units         -- SubQ Every 4 hours PRN 06/24/23 0240                Malnutrition of mild degree     Nutrition consulted. Most recent weight and BMI monitored-      Measurements:        Wt Readings from Last 1 Encounters:   06/28/23 69 kg (152 lb 1.9 oz)   Body mass index is 22.46 kg/m².     Patient has been screened and assessed by RD.     Malnutrition Type:  Context: chronic illness  Level: moderate     Malnutrition Characteristic Summary:  Weight Loss (Malnutrition): 10% in 6 months  Subcutaneous Fat (Malnutrition): moderate depletion  Muscle Mass (Malnutrition): moderate depletion     Interventions/Recommendations (treatment strategy):  1. Continue Diabetic diet, add Renal diet restrictions - change ONS to [facility]. 2. RD to monitor & follow-up.         HLD (hyperlipidemia)  on lipitor           Anemia in ESRD (end-stage renal disease)     Patient's with Normocytic anemia.. Hemoglobin stable. Etiology likely due to chronic disease .        Current CBC reviewed-  Recent Labs   Lab 06/27/23  1443 06/28/23  0446 06/29/23  0543   HGB 10.9* 10.1* 9.8*    Monitor CBC and transfuse if H/H drops below 7/21.      Multiple subsegmental pulmonary emboli without acute cor pulmonale  H/o PE- continue eliquis  - Eliquis held 6/27 due to bleeding avf, resume tomorrow if no further bleeding with HD today.        Chronic kidney disease-mineral and bone disorder           Chronic hypoxemic respiratory failure  as above   6/29 sats 84% on 4LNC.     Diabetes mellitus with chronic kidney disease on chronic dialysis  continue HD per nephro     ESRD (end stage renal disease)  Nephrology consulted for HD.         Final Active Diagnoses:    Diagnosis Date Noted POA    PRINCIPAL PROBLEM:  Acute on chronic respiratory failure with hypoxia and hypercapnia [J96.21, J96.22] 06/21/2023 Yes    End-stage  renal disease on hemodialysis [N18.6, Z99.2] 06/28/2023 Not Applicable    AV graft malfunction [T82.590A] 06/27/2023 No    Acute on chronic diastolic heart failure [I50.33] 06/21/2023 Yes    Mastoiditis of left side [H70.92] 06/21/2023 Yes    Moderate malnutrition [E44.0] 06/21/2023 Yes    Hypertension [I10] 03/23/2023 Yes    COPD (chronic obstructive pulmonary disease) [J44.9] 03/15/2023 Yes    Type 2 diabetes mellitus with hyperglycemia, with long-term current use of insulin [E11.65, Z79.4] 03/07/2023 Not Applicable     Chronic    Malnutrition of mild degree [E44.1] 12/21/2022 Yes    Anemia in ESRD (end-stage renal disease) [N18.6, D63.1] 11/18/2022 Yes     Chronic    HLD (hyperlipidemia) [E78.5] 11/18/2022 Yes     Chronic    Multiple subsegmental pulmonary emboli without acute cor pulmonale [I26.94] 10/13/2022 Yes     Chronic    Chronic hypoxemic respiratory failure [J96.11] 10/12/2022 Yes     Chronic    Diabetes mellitus with chronic kidney disease on chronic dialysis [E10.22, N18.6, Z99.2] 10/12/2022 Not Applicable    Chronic kidney disease-mineral and bone disorder [N18.9, E83.9, M89.9] 10/12/2022 Yes    ESRD (end stage renal disease) [N18.6]  Yes      Problems Resolved During this Admission:    Diagnosis Date Noted Date Resolved POA    Mastoiditis of right side [H70.91] 06/21/2023 06/21/2023 Yes         Medications:  Reconciled Home Medications:      Medication List        Change how you take these medications      atorvastatin 40 MG tablet  Commonly known as: LIPITOR  Take 1 tablet (40 mg total) by mouth once daily.  What changed: when to take this            Continue taking these medications      acetaminophen 650 MG Tbsr  Commonly known as: TYLENOL  Take 650 mg by mouth every 8 (eight) hours as needed (pain or fever over 100.4).     albuterol 90 mcg/actuation inhaler  Commonly known as: PROVENTIL/VENTOLIN HFA  Inhale 2 puffs into the lungs 4 (four) times daily as needed (breathing).      albuterol-ipratropium 2.5 mg-0.5 mg/3 mL nebulizer solution  Commonly known as: DUO-NEB  Take 3 mLs by nebulization every 4 (four) hours while awake. Rescue     apixaban 5 mg Tab  Commonly known as: ELIQUIS  Take 5 mg by mouth 2 (two) times daily.     artificial tears 0.5 % ophthalmic solution  Commonly known as: ISOPTO TEARS  Place 1 drop into both eyes 6 (six) times daily.     aspirin 81 MG EC tablet  Commonly known as: ECOTRIN  Take 81 mg by mouth once daily.     carvediloL 6.25 MG tablet  Commonly known as: COREG  Take 6.25 mg by mouth 2 (two) times daily.     cetirizine 10 MG tablet  Commonly known as: ZYRTEC  Take 10 mg by mouth daily as needed (itching).     cloNIDine 0.3 mg/24 hr td ptwk 0.3 mg/24 hr  Commonly known as: CATAPRES  APPLY ONE PATCH TOPICALLY EVERY WEEK FOR HIGH BLOOD PRESSURE     epoetin xavier 10,000 unit/mL injection  Commonly known as: PROCRIT  Inject 0.7 mLs (7,000 Units total) into the skin every Tues, Thurs, Sat.     FLUoxetine 40 MG capsule  Take 40 mg by mouth once daily.     fluticasone-salmeterol 250-50 mcg/dose 250-50 mcg/dose diskus inhaler  Commonly known as: ADVAIR  Inhale 1 puff into the lungs 2 (two) times daily.     GLUCAGON EMERGENCY KIT (HUMAN) 1 mg Solr  Generic drug: glucagon  1 mg into the muscle as needed if CBG < 70     HYDROPHILIC CREAM TOP  Apply liberal amount to affected area twice daily for dry skin     * insulin detemir U-100 (Levemir) 100 unit/mL (3 mL) Inpn pen  Inject 4 Units into the skin every evening.     * insulin detemir U-100 (Levemir) 100 unit/mL (3 mL) Inpn pen  Inject 8 Units into the skin once daily.     insulin lispro 100 unit/mL pen  Commonly known as: HumaLOG KwikPen Insulin  Inject 8 Units into the skin 3 (three) times daily before meals.     isosorbide mononitrate 30 MG 24 hr tablet  Commonly known as: IMDUR  Take 1 tablet (30 mg total) by mouth once daily.     lisinopriL 40 MG tablet  Commonly known as: PRINIVIL,ZESTRIL  Take 1 tablet (40 mg  total) by mouth every evening.     melatonin 3 mg Tbdl  Take 9 mg by mouth nightly as needed (sleep).     NEPRO CARB STEADY ORAL  Give 1 carton by mouth with each meal.     NIFEdipine 60 MG (OSM) 24 hr tablet  Commonly known as: PROCARDIA-XL  Take 1 tablet (60 mg total) by mouth 2 (two) times a day.     ondansetron 8 MG tablet  Commonly known as: ZOFRAN  Take 1 tablet by mouth 3 (three) times daily as needed for Nausea.     sevelamer carbonate 800 mg Tab  Commonly known as: RENVELA  Take 800 mg by mouth 3 (three) times daily.     sodium chloride 0.65 % nasal spray  Commonly known as: Saline NasaL  1 spray by Nasal route as needed (dry nostril).     tiotropium bromide 2.5 mcg/actuation inhaler  Commonly known as: SPIRIVA RESPIMAT  Inhale 2 puffs into the lungs Daily.     vitamin renal formula (B-complex-vitamin c-folic acid) 1 mg Cap  Commonly known as: NEPHROCAP  Take 1 capsule by mouth once daily.     white petrolatum ointment  Commonly known as: VASELINE  Apply topically once daily. Apply small amount to both nostrils daily      * This list has 2 medication(s) that are the same as other medications prescribed for you. Read the directions carefully, and ask your doctor or other care provider to review them with you.       Stop taking these medications      calcium acetate(phosphat bind) 667 mg capsule  Commonly known as: PHOSLO     cloNIDine 0.1 MG tablet  Commonly known as: CATAPRES     doxycycline 100 MG Cap  Commonly known as: VIBRAMYCIN     erythromycin ophthalmic ointment  Commonly known as: ROMYCIN     triamcinolone acetonide 0.025% 0.025 % cream  Commonly known as: KENALOG     vits A and D-white pet-lanolin ointment                Discharged Condition: fair    Disposition: Long Term Care                 Follow Up:     Patient Instructions:   No discharge procedures on file.    Laboratory/Diagnostic Data:    CBC/Anemia Labs: Coags:    Recent Labs   Lab 06/28/23  0446 06/29/23  0543 06/30/23  0549   WBC 3.14*  3.08* 3.19*   HGB 10.1* 9.8* 9.4*   HCT 31.9* 30.6* 29.7*    121* 142*   MCV 94 93 94   RDW 13.7 14.0 13.9    Recent Labs   Lab 06/27/23  1443   INR 1.4*   APTT 59.3*        Chemistries: ABG:   Recent Labs   Lab 06/28/23  0446 06/29/23  0543 06/30/23  0549   * 134* 133*   K 4.2 4.8 4.7   CL 97 97 96   CO2 25 26 25   BUN 25* 42* 51*   CREATININE 3.0* 4.1* 4.9*   CALCIUM 8.2* 7.6* 8.0*   PROT 6.1 6.1 6.4   BILITOT 0.6 0.5 0.6   ALKPHOS 161* 161* 174*   ALT 17 18 21   AST 21 26 27   MG 1.9 2.1 2.3   PHOS 3.0 3.6 3.6    No results for input(s): PH, PCO2, PO2, HCO3, POCSATURATED, BE in the last 168 hours.     POCT Glucose: HbA1c:    Recent Labs   Lab 06/28/23  2034 06/29/23  0838 06/29/23  1144 06/29/23  1614 06/29/23  2116 06/30/23  0728   POCTGLUCOSE 270* 243* 301* 313* 267* 245*    Hemoglobin A1C   Date Value Ref Range Status   06/21/2023 10.7 (H) 4.0 - 5.6 % Final     Comment:     ADA Screening Guidelines:  5.7-6.4%  Consistent with prediabetes  >or=6.5%  Consistent with diabetes    High levels of fetal hemoglobin interfere with the HbA1C  assay. Heterozygous hemoglobin variants (HbS, HgC, etc)do  not significantly interfere with this assay.   However, presence of multiple variants may affect accuracy.     03/06/2023 9.5 (H) 4.0 - 5.6 % Final     Comment:     ADA Screening Guidelines:  5.7-6.4%  Consistent with prediabetes  >or=6.5%  Consistent with diabetes    High levels of fetal hemoglobin interfere with the HbA1C  assay. Heterozygous hemoglobin variants (HbS, HgC, etc)do  not significantly interfere with this assay.   However, presence of multiple variants may affect accuracy.     12/20/2022 7.5 (H) 4.0 - 5.6 % Final     Comment:     ADA Screening Guidelines:  5.7-6.4%  Consistent with prediabetes  >or=6.5%  Consistent with diabetes    High levels of fetal hemoglobin interfere with the HbA1C  assay. Heterozygous hemoglobin variants (HbS, HgC, etc)do  not significantly interfere with this assay.    However, presence of multiple variants may affect accuracy.          Cardiac Enzymes: Ejection Fractions:    No results for input(s): CPK, CPKMB, MB, TROPONINI in the last 72 hours. EF   Date Value Ref Range Status   03/20/2023 55 % Final   11/23/2022 63 % Final          No results for input(s): COLORU, APPEARANCEUA, PHUR, SPECGRAV, PROTEINUA, GLUCUA, KETONESU, BILIRUBINUA, OCCULTUA, NITRITE, UROBILINOGEN, LEUKOCYTESUR, RBCUA, WBCUA, BACTERIA, SQUAMEPITHEL, HYALINECASTS in the last 48 hours.    Invalid input(s): WRIGHTSUR    Procalcitonin (ng/mL)   Date Value   06/20/2023 0.54 (H)   03/17/2023 1.06 (H)   03/10/2023 0.55 (H)   10/12/2022 1.87 (H)     Lactate (Lactic Acid) (mmol/L)   Date Value   06/21/2023 1.2   06/20/2023 3.0 (H)   04/02/2023 1.4   03/10/2023 1.0   10/12/2022 2.0     BNP (pg/mL)   Date Value   06/20/2023 3,281 (H)   06/14/2023 2,762 (H)   03/16/2023 4,802 (H)   03/04/2023 4,520 (H)   02/21/2023 >4,900 (H)     CRP (mg/L)   Date Value   06/20/2023 13.3 (H)   10/13/2022 44.2 (H)   10/11/2022 73.4 (H)     Sed Rate (mm/Hr)   Date Value   10/11/2022 55 (H)     D-Dimer (mg/L FEU)   Date Value   10/12/2022 3.05 (H)     Ferritin (ng/mL)   Date Value   02/23/2023 1,803 (H)     No results found for: LDH  Troponin I (ng/mL)   Date Value   06/21/2023 0.031 (H)   06/21/2023 0.034 (H)   06/20/2023 0.028 (H)   06/20/2023 0.026   06/14/2023 0.038 (H)   03/16/2023 0.033 (H)   03/05/2023 0.037 (H)   03/05/2023 0.035 (H)     CPK (U/L)   Date Value   10/13/2022 134     No results found for this or any previous visit.  SARS-CoV2 (COVID-19) Qualitative PCR (no units)   Date Value   03/31/2023 Not Detected   03/29/2023 Not Detected   03/04/2023 Not Detected   01/06/2023 Not Detected   11/18/2022 Not Detected       Microbiology labs for the last week  Microbiology Results (last 7 days)       Procedure Component Value Units Date/Time    (rule out SBP) Culture, Anaerobic [[id number]] Collected: 06/21/23 0554    Order Status:  Completed Specimen: Ascites Updated: 06/28/23 0849     Anaerobic Culture No anaerobes isolated    Narrative:      To rule out SBP order labs: Aerobic culture [[id number]],  Culture, Anaerobic [[id number]], Gram stain [[id number]], Albumin  [[id number]], Protein [[id number]], LDH [AFM151], WBC \T\ Dff  [[id number]].    Blood Culture #2 **CANNOT BE ORDERED STAT** [[id number]] Collected: 06/21/23 0237    Order Status: Completed Specimen: Blood from Peripheral, Antecubital, Left Updated: 06/26/23 0612     Blood Culture, Routine No growth after 5 days.    Blood Culture #1 **CANNOT BE ORDERED STAT** [[id number]] Collected: 06/21/23 0238    Order Status: Completed Specimen: Blood from Peripheral, Upper Arm, Right Updated: 06/26/23 0612     Blood Culture, Routine No growth after 5 days.    (rule out SBP) Aerobic culture [[id number]] Collected: 06/21/23 0554    Order Status: Completed Specimen: Ascites Updated: 06/24/23 1148     Aerobic Bacterial Culture No growth    Narrative:      To rule out SBP order labs: Aerobic culture [[id number]],  Culture, Anaerobic [[id number]], Gram stain [[id number]], Albumin  [LUP440], Protein [[id number]], LDH [PHX446], WBC \T\ Dff  [[id number]].              Reviewed and noted in plan where applicable- Please see chart for full lab data.    Lines/Drains:       Peripheral IV - Single Lumen 06/26/23 1732 20 G Distal;Posterior;Right Forearm (Active)   Site Assessment Clean;Dry;Intact;No redness;No swelling 06/30/23 0744   Line Status Flushed;Saline locked 06/30/23 0744   Dressing Status Clean;Dry;Intact 06/30/23 0744   Dressing Intervention Integrity maintained 06/30/23 0744   Number of days: 3            Hemodialysis AV Fistula Left upper arm (Active)   Needle Size 15ga 06/30/23 0815   Site Assessment Clean;Dry;Intact 06/30/23 0815   Patency Present;Thrill;Bruit 06/30/23 0815   Status Accessed 06/30/23 0815   Flows Good 06/30/23 0815   Dressing Intervention Integrity maintained 06/29/23 2035   Dressing Status Clean;Dry;Intact 06/29/23  2035   Site Condition No complications 06/30/23 0815   Dressing Gauze 06/29/23 2035   Number of days:        Imaging  ECG Results              EKG 12-lead (Final result)  Result time 06/21/23 08:42:19      Final result by Interface, Lab In [facility] (06/21/23 08:42:19)               Narrative:    Test Reason : R41.82,    Vent. Rate : 057 BPM     Atrial Rate : 057 BPM     P-R Int : 266 ms          QRS Dur : 110 ms      QT Int : 534 ms       P-R-T Axes : 041 106 069 degrees     QTc Int : 519 ms    Sinus bradycardia with 1st degree A-V block  Rightward axis  Prolonged QT  Nonspecific T wave abnormality  Abnormal ECG  When compared with ECG of 14-JUN-2023 14:57,  Nonspecific T wave abnormality, worse in Anterior leads  Confirmed by JAZMINE SEWELL MD ([id number]) on 6/21/2023 8:42:12 AM    Referred By: [email or web address]   SELF           Confirmed By:JAZMINE SEWELL MD                                  Results for orders placed during the hospital encounter of 03/16/23    Echo    Interpretation Summary  · The left ventricle is normal in size with normal systolic function.  · The estimated ejection fraction is 55%.  · Grade II left ventricular diastolic dysfunction.  · Moderate right ventricular enlargement with moderately reduced right ventricular systolic function.  · Biatrial enlargement.  · Interatrial septum bows to left, consider elevated right atrial pressure.  · Mild tricuspid regurgitation.  · The estimated PA systolic pressure is 54 mmHg.  · There is pulmonary hypertension.  · Elevated central venous pressure (15 mmHg).  · Small circumferential pericardial effusion.  · There are bilateral pleural effusions.  · There is ascites present.      [facility] Hemodialysis Access  Indication  ========    End Stage Renal Disease on Dialysis    Dialysis Access  =============    Dialysis access location:  Left Arm  Type of AV access: Brachiobasilic AVF  Lt inflow A PSV    410 cm/s  Lt at anastomosis PSV  528 cm/s  Lt A distal anastomosis PSV     419 cm/s  Lt fistula prox PSV    117 cm/s  Lt fistula mid  cm/s  Lt fistula mid flow volume 3,394 ml/min  Lt fistula distal PSV  162 cm/s  Lt outflow V prox PSV  104 cm/s  Lt outflow V mid PSV   52 cm/s  Lt outflow V distal PSV    66 cm/s    Impression  =========    Color flow duplex exam reveals a patent, tortuous and aneurysmal left Brachiobasilic AV fistula with no evidence of a  hemodynamically significant stenosis despite slightly elevated velocities distal to the anastomosis, this area appears patent (smaller  in caliber) without a focal narrowing. Volume flow at mid bicep level measures 3394 ml/min.    DATE OF SERVICE: 06/28/2023                                                      Sonographer: Laura Randhawa  Electronically Signed by: Lul Rojo M.D. at 06/28/2023-08:21      Imaging:  Imaging Results              X-Ray Abdomen AP 1 View (KUB) (Final result)  Result time 06/20/23 23:55:39      Final result by Aguilar Garcia MD (06/20/23 23:55:39)               Impression:      Cardiomegaly with probable bibasilar pulmonary edema and small bilateral pleural effusions, though with mildly improved bibasilar aeration when compared with 04/02/2023.    Nonobstructive bowel gas pattern with findings which may suggest underlying ascites.      Electronically signed by: Aguilar Garcia MD  Date:    06/20/2023  Time:    23:55             Narrative:    EXAMINATION:  XR ABDOMEN AP 1 VIEW    CLINICAL HISTORY:  Altered mental status, unspecified.    TECHNIQUE:  Single AP View of the abdomen was performed.    COMPARISON:  04/02/2023.    FINDINGS:  Partially visualized cardiac silhouette is enlarged similar to the prior exam.  Patchy bibasilar interstitial and airspace opacities with probable mild improvement in bibasilar aeration when compared with 04/02/2023.  Small bilateral pleural effusions suspected.  Bowel gas pattern is nonobstructive.  Relative central positioning of small-bowel gas pattern which may be  related to underlying ascites.  No pathologic calcifications identified.  Bones demonstrate mild degenerative changes and relatively sclerotic appearance which may suggest underlying renal osteodystrophy.  No unexpected metallic foreign body identified in the field of view.                                     CT Head Without Contrast (Final result)  Result time 06/20/23 21:26:31      Final result by Rhina Duenas MD (06/20/23 21:26:31)               Impression:      No convincing acute intracranial abnormalities as imaged allowing for significant artifact.    Left mastoiditis.    Patchy deep white matter low density as seen with microvascular chronic ischemic changes.    Remote lacunar infarct in the right basal ganglia.      Electronically signed by: Rhina Duenas  Date:    06/20/2023  Time:    21:26             Narrative:    EXAMINATION:  CT HEAD WITHOUT CONTRAST    CLINICAL HISTORY:  Mental status change, unknown cause;    TECHNIQUE:  Low dose axial images were obtained through the head.  Coronal and sagittal reformations were also performed. Contrast was not administered.    COMPARISON:  CT orbits 03/10/2023.    FINDINGS:  Severe motion artifact is present throughout the exam limiting assessment.  The no convincing acute intracranial hemorrhage or hematoma.  The allowing for motion there is no loss of the gray-white matter junction differentiation to suggest acute major arterial infarct.  Low density in the periventricular white matter the is noted.  There is stable low density in the right basal ganglia compatible with remote lacunar infarct.    There is soft tissue prominence over the left zygoma and lateral orbit.  Scattered opacified left mastoid air cells compatible with mastoiditis.  Middle ears appear clear.  Bony calvarium is intact.                                     X-Ray Chest AP Portable (Final result)  Result time 06/20/23 20:33:32      Final result by Juventino Castro MD (06/20/23  20:33:32)               Impression:      1. Pulmonary findings suggest edema noting left pleural effusion.  Developing consolidation not excluded.  Correlation and follow-up is advised.      Electronically signed by: Jvuentino Castro MD  Date:    06/20/2023  Time:    20:33             Narrative:    EXAMINATION:  XR CHEST AP PORTABLE    CLINICAL HISTORY:  Shortness of breath    TECHNIQUE:  Single frontal view of the chest was performed.    COMPARISON:  03/31/2023    FINDINGS:  The cardiomediastinal silhouette is prominent, similar to the previous exam.  There is obscuration of the left costophrenic angle, possibly reflecting small effusion..  The trachea is midline.  The lungs are symmetrically expanded bilaterally with prominent interstitial attenuation in a perihilar distribution.  There is bandlike atelectasis projected over the right midlung zone..  There is no pneumothorax.  The osseous structures are remarkable for degenerative changes.  Surgical change noted of the left chest wall..                                      Follow Up Instructions:     Future Appointments   Date Time Provider Department Center   7/3/2023  7:00 AM BELLA MEDINA [facility]   7/6/2023 10:00 AM Snow Calle NP [facility]   9/5/2023  9:30 AM Micah Head MD [facility]       Discharge Instructions:  Discharge Procedure Orders   Ambulatory referral/consult to Vascular Surgery   Standing Status: Future   Referral Priority: Routine Referral Type: Consultation   Referral Reason: Specialty Services Required   Requested Specialty: Vascular Surgery   Number of Visits Requested: 1     Ambulatory referral/consult to Endocrinology   Standing Status: Future   Referral Priority: Routine Referral Type: Consultation   Requested Specialty: Endocrinology   Number of Visits Requested: 1         Total time spent on discharge 50 minutes     Jonathan Marie MD  Attending Staff Physician  [facility]

## 2023-06-30 NOTE — DISCHARGE INSTRUCTIONS
scheduled for 9 AM fistulogram on 7/3/23  he will need to arrive at 7 AM  to cath lab on 3rd floor. NPO midnight

## 2023-06-30 NOTE — PLAN OF CARE
Problem: Infection  Goal: Absence of Infection Signs and Symptoms  Outcome: Ongoing, Progressing     Problem: Adult Inpatient Plan of Care  Goal: Plan of Care Review  Outcome: Ongoing, Progressing  Goal: Patient-Specific Goal (Individualized)  Outcome: Ongoing, Progressing  Goal: Absence of Hospital-Acquired Illness or Injury  Outcome: Ongoing, Progressing  Goal: Optimal Comfort and Wellbeing  Outcome: Ongoing, Progressing  Goal: Readiness for Transition of Care  Outcome: Ongoing, Progressing     Problem: Diabetes Comorbidity  Goal: Blood Glucose Level Within Targeted Range  Outcome: Ongoing, Progressing     Problem: Impaired Wound Healing  Goal: Optimal Wound Healing  Outcome: Ongoing, Progressing     Problem: Device-Related Complication Risk (Hemodialysis)  Goal: Safe, Effective Therapy Delivery  Outcome: Ongoing, Progressing     Problem: Hemodynamic Instability (Hemodialysis)  Goal: Effective Tissue Perfusion  Outcome: Ongoing, Progressing     Problem: Infection (Hemodialysis)  Goal: Absence of Infection Signs and Symptoms  Outcome: Ongoing, Progressing     Problem: Fall Injury Risk  Goal: Absence of Fall and Fall-Related Injury  Outcome: Ongoing, Progressing     Problem: Skin Injury Risk Increased  Goal: Skin Health and Integrity  Outcome: Ongoing, Progressing     Problem: Electrolyte Imbalance (Chronic Kidney Disease)  Goal: Electrolyte Balance  Outcome: Ongoing, Progressing     Problem: Fluid Volume Excess (Chronic Kidney Disease)  Goal: Fluid Balance  Outcome: Ongoing, Progressing    Pt AAOx4. BP elevated throughout shift. No complaints of pain or discomfort. Routine meds administered. Dialysis complete today. Safety measures maintained. Discharge orders placed. Report called. Pt awaiting transportation.

## 2023-06-30 NOTE — PROGRESS NOTES
Dialysis completed. Needles removed from UMA fistula with pressure held to sites for 10 minutes each with hemostasis achieved . Gauze dressing applied. Patient dialyzed for 3.5 hours with fluid removal of 3 liters. Tolerated well with stable vital signs. Patient returned to his room via stretcher.

## 2023-06-30 NOTE — PROGRESS NOTES
Patient received in the [facility] per wheelchair. Maintenance (MWF)dialysis started via 15 gauge fistula needles to UMA fistula.

## 2023-06-30 NOTE — HPI
Reason for Consult: Management of T2DM, Hyperglycemia     Diabetes diagnosis year: 2018    Home Diabetes Medications:  Levemir 8 am, 4 pm. Humalog 8 AC    How often checking glucose at home? 1-3 x day   BG readings on regimen: ***  Hypoglycemia on the regimen?  No  Missed doses on regimen?  No  Diabetes Complications include:     Hyperglycemia, Diabetic chronic kidney disease     , and Diabetic retinopathy      Complicating diabetes co morbidities:   CKD and ESRD      HPI:   Patient is a 57 y.o. male with HFpEF (LVEF 55%, Grade II DD on echo 3/20/23), chronic RF on home O2, IDDM2, ESRD on HD, COPD, HTN, muliple PEs on eliquis who presented initially from [facility] for evaluation of AMS and hypoxia. He was in his usual state of health until yesterday afternoon when staff noticed he was more lethargic than normal, and was satting in the 80s.  Initially in MICU than stepdown to floor.  Endocrine consulted for bg management

## 2023-06-30 NOTE — PLAN OF CARE
[facility] - Telemetry Stepdown  Discharge Final Note    Primary Care Provider: Primary Doctor No    Expected Discharge Date: 6/30/2023      Future Appointments   Date Time Provider Department Center   7/3/2023  7:00 AM 3PREP, BELLA KARINA [facility]   7/6/2023 10:00 AM Snow Calle NP [facility]   7/14/2023  1:00 PM Kirsten Sierra NP [facility]   9/5/2023  9:30 AM Micah Head MD [facility]          Final Discharge Note (most recent)       Final Note - 06/30/23 1536          Final Note    Assessment Type Final Discharge Note     Anticipated Discharge Disposition residential Nursing Home     Hospital Resources/Appts/Education Provided Appointments scheduled and added to AVS        Post-Acute Status    Post-Acute Authorization Placement     Post-Acute Placement Status Set-up Complete/Auth obtained   [facility]    Discharge Delays None known at this time                     Important Message from Medicare  Important Message from Medicare regarding Discharge Appeal Rights: Explained to patient/caregiver, Other (comments) (Verbal consent from his Mother, ADALGISA Awad witnessed)     Date IMM was signed: 06/29/23  Time IMM was signed: [id number]        Abby Estrella, RN  Ext [phone]

## 2023-06-30 NOTE — PROGRESS NOTES
Nick Menjivar - Telemetry [facility] Medicine  Progress Note    Patient Name: Cosme Lewis  MRN: [id number]  Patient Class: IP- Inpatient   Admission Date: 6/20/2023  Length of Stay: 9 days  Attending Physician: Jonathan Marie MD  Primary Care Provider: Primary Doctor No        Subjective:     Principal Problem:Acute on chronic respiratory failure with hypoxia and hypercapnia        HPI:  Copied from MICU team who admitted the patient:    Mr. Cosme Lewis is a 56 y/o M with hx of HFpEF (LVEF 55%, Grade II DD on echo 3/20/23), chronic RF on home O2, IDDM2, ESRD on HD, COPD, HTN, muliple PEs on eliquis who presented to the ED from [facility] for evaluation of AMS and hypoxia. He was in his usual state of health until yesterday afternoon when staff noticed he was more lethargic than normal, and was satting in the 80s.      In the ED, patient afebrile, hypertensive to 170s systolic, HR 50s, hypoxic requiring 10L NRB. CBC grossly unchanged from BL with chronic leukopenia and anemia. CMP also grossly unchanged from BL, with known mild hyponatremia. Lactic elevated to 3.0. BNP 3200. Trops wnl. Procal and CRP elevated. CXR demonstrating bilateral edema as well as L pleural effusion. CTH showing L mastoiditis, otherwise no acute intracranial abnormality. Initial VBG demonstrating pH 7.30/PCO2 68. Patient given 80 lasix IV, duo-nebs, ceftriaxone, and solumedrol. repeat VBG with pH down to 7.27, CO2 76. Patient placed on bipap and MICU consulted for management of severe respiratory acidosis s/o HFE and COPD.            Overview/Hospital Course:  Admitted to Critical Care Medicine for acute respiratory failure requiring continuous BiPap use. Nephrology consult initiated dialysis for volume removal. BiPap weaned to nasal cannula. Started on Ceftriaxone/Metronidazole to address mastoiditis/pneumonia concerns and transitioned to Augmentin(ESRD dosage) to complete 7 day course. Underwent paracentesis for ascites. SAAG <1.1, likely  related to hypoalbuminemia and decompensated heart failure. Cytology -negative for malignancy, tumor markers- non-specific mild elevation. Resumed all home medications for blood pressure controlled. Insulin infusion started for hyperglycemia and was transitioned to basal/prandial injections - further intensified for glycemic control. Stepped down to  6/23/23. Had bleeding from avf after last round of HD, therefore discharge postponed. Eliquis and aspirin held, hemostasis achieved, vascular sx consulted. Monitoring for recurrent bleeding after next round of HD (today).    6/29 ICU stepdown for acute on chronic hypercapnic respiratory failure, encephalopathy due to adhf, resolved with HD and continuous BIPAP. Remains in the hospital due bleeding from dialysis graft. Held eliquis (PE) and aspirin. Vascular surgery is consulted, US completed - no stenosis/aneurysm.  Bleeding controlled with pressure dressing. vascular sx recs holding ASA and Eliquis. OK for DVT prophylaxis. patient had PE 10/22 Pulmonary thromboembolism within a lobar branch to the right lower lobe noting additional pulmonary thromboembolism within segmental branches to the right upper lobe. on oxygen 3LNC . Plan is to do LUE fistulogram Monday. resumed ASA and eliquis per vascular surgery   6/30 HB stable on eliquis. discharge to NH with vasular sx follow up for LUE fistulogram Monday.  no need to hold ASA and eliquis per vascular surgery           Review of Systems:   Pain scale:   Constitutional:  fever,  chills, headache, vision loss, hearing loss, weight loss, Generalized weakness, falls, loss of smell, loss of taste, poor appetite,  sore throat  Respiratory: cough, shortness of breath.   Cardiovascular: chest pain, dizziness, palpitations, orthopnea, swelling of feet, syncope  Gastrointestinal: nausea, vomiting, abdominal pain, diarrhea, black stool,  blood in stool, change in bowel habits  Genitourinary: hematuria, dysuria, urgency,  frequency  Integument/Breast: rash,  pruritis  Hematologic/Lymphatic: easy bruising, lymphadenopathy  Musculoskeletal: arthralgias , myalgias, back pain, neck pain, knee pain  Neurological: confusion, seizures, tremors, slurred speech  Behavioral/Psych:  depression, anxiety, auditory or visual hallucinations     OBJECTIVE:     Physical Exam:  Body mass index is 22.46 kg/m².    Constitutional: Appears well-developed and well-nourished. hard of hearing   Head: Normocephalic and atraumatic.   Neck: Normal range of motion. Neck supple.   Cardiovascular: Normal heart rate.  Regular heart rhythm.  Pulmonary/Chest: Effort normal.   Abdominal: No distension.  No tenderness  Musculoskeletal: Normal range of motion. No edema. LUE AV graft with thrill. no bleed . pressure dressing   Neurological: Alert and oriented to person, place, and time.   Skin: Skin is warm and dry.   Psychiatric: Normal mood and affect. Behavior is normal.                  Vital Signs  Temp: 97.8 °F (36.6 °C) (06/30/23 0800)  Pulse: 83 (06/30/23 0900)  Resp: 18 (06/30/23 0815)  BP: (Abnormal) 166/83 (06/30/23 0845)  SpO2: 99 % (06/30/23 0900)     24 Hour VS Range    Temp:  [97.8 °F (36.6 °C)-99.1 °F (37.3 °C)]   Pulse:  [72-83]   Resp:  [16-22]   BP: (138-166)/(72-89)   SpO2:  [90 %-99 %]   No intake or output data in the 24 hours ending 06/30/23 0911        I/O This Shift:  No intake/output data recorded.    Wt Readings from Last 3 Encounters:   06/28/23 69 kg (152 lb 1.9 oz)   05/09/23 76.2 kg (168 lb)   03/31/23 76.2 kg (168 lb)       I have personally reviewed the vitals and recorded Intake/Output     Laboratory/Diagnostic Data:    CBC/Anemia Labs: Coags:    Recent Labs   Lab 06/28/23  0446 06/29/23  0543 06/30/23  0549   WBC 3.14* 3.08* 3.19*   HGB 10.1* 9.8* 9.4*   HCT 31.9* 30.6* 29.7*    121* 142*   MCV 94 93 94   RDW 13.7 14.0 13.9    Recent Labs   Lab 06/27/23  1443   INR 1.4*   APTT 59.3*        Chemistries: ABG:   Recent Labs   Lab  06/28/23  0446 06/29/23  0543 06/30/23  0549   * 134* 133*   K 4.2 4.8 4.7   CL 97 97 96   CO2 25 26 25   BUN 25* 42* 51*   CREATININE 3.0* 4.1* 4.9*   CALCIUM 8.2* 7.6* 8.0*   PROT 6.1 6.1 6.4   BILITOT 0.6 0.5 0.6   ALKPHOS 161* 161* 174*   ALT 17 18 21   AST 21 26 27   MG 1.9 2.1 2.3   PHOS 3.0 3.6 3.6    No results for input(s): PH, PCO2, PO2, HCO3, POCSATURATED, BE in the last 168 hours.     POCT Glucose: HbA1c:    Recent Labs   Lab 06/28/23  2034 06/29/23  0838 06/29/23  1144 06/29/23  1614 06/29/23  2116 06/30/23  0728   POCTGLUCOSE 270* 243* 301* 313* 267* 245*    Hemoglobin A1C   Date Value Ref Range Status   06/21/2023 10.7 (H) 4.0 - 5.6 % Final     Comment:     ADA Screening Guidelines:  5.7-6.4%  Consistent with prediabetes  >or=6.5%  Consistent with diabetes    High levels of fetal hemoglobin interfere with the HbA1C  assay. Heterozygous hemoglobin variants (HbS, HgC, etc)do  not significantly interfere with this assay.   However, presence of multiple variants may affect accuracy.     03/06/2023 9.5 (H) 4.0 - 5.6 % Final     Comment:     ADA Screening Guidelines:  5.7-6.4%  Consistent with prediabetes  >or=6.5%  Consistent with diabetes    High levels of fetal hemoglobin interfere with the HbA1C  assay. Heterozygous hemoglobin variants (HbS, HgC, etc)do  not significantly interfere with this assay.   However, presence of multiple variants may affect accuracy.     12/20/2022 7.5 (H) 4.0 - 5.6 % Final     Comment:     ADA Screening Guidelines:  5.7-6.4%  Consistent with prediabetes  >or=6.5%  Consistent with diabetes    High levels of fetal hemoglobin interfere with the HbA1C  assay. Heterozygous hemoglobin variants (HbS, HgC, etc)do  not significantly interfere with this assay.   However, presence of multiple variants may affect accuracy.          Cardiac Enzymes: Ejection Fractions:    No results for input(s): CPK, CPKMB, MB, TROPONINI in the last 72 hours. EF   Date Value Ref Range Status    03/20/2023 55 % Final   11/23/2022 63 % Final          No results for input(s): COLORU, APPEARANCEUA, PHUR, SPECGRAV, PROTEINUA, GLUCUA, KETONESU, BILIRUBINUA, OCCULTUA, NITRITE, UROBILINOGEN, LEUKOCYTESUR, RBCUA, WBCUA, BACTERIA, SQUAMEPITHEL, HYALINECASTS in the last 48 hours.    Invalid input(s): WRIGHTSUR    Procalcitonin (ng/mL)   Date Value   06/20/2023 0.54 (H)   03/17/2023 1.06 (H)   03/10/2023 0.55 (H)   10/12/2022 1.87 (H)     Lactate (Lactic Acid) (mmol/L)   Date Value   06/21/2023 1.2   06/20/2023 3.0 (H)   04/02/2023 1.4   03/10/2023 1.0   10/12/2022 2.0     BNP (pg/mL)   Date Value   06/20/2023 3,281 (H)   06/14/2023 2,762 (H)   03/16/2023 4,802 (H)   03/04/2023 4,520 (H)   02/21/2023 >4,900 (H)     CRP (mg/L)   Date Value   06/20/2023 13.3 (H)   10/13/2022 44.2 (H)   10/11/2022 73.4 (H)     Sed Rate (mm/Hr)   Date Value   10/11/2022 55 (H)     D-Dimer (mg/L FEU)   Date Value   10/12/2022 3.05 (H)     Ferritin (ng/mL)   Date Value   02/23/2023 1,803 (H)     No results found for: LDH  Troponin I (ng/mL)   Date Value   06/21/2023 0.031 (H)   06/21/2023 0.034 (H)   06/20/2023 0.028 (H)   06/20/2023 0.026   06/14/2023 0.038 (H)   03/16/2023 0.033 (H)   03/05/2023 0.037 (H)   03/05/2023 0.035 (H)     CPK (U/L)   Date Value   10/13/2022 134     No results found for this or any previous visit.  SARS-CoV2 (COVID-19) Qualitative PCR (no units)   Date Value   03/31/2023 Not Detected   03/29/2023 Not Detected   03/04/2023 Not Detected   01/06/2023 Not Detected   11/18/2022 Not Detected       Microbiology labs for the last week  Microbiology Results (last 7 days)       Procedure Component Value Units Date/Time    (rule out SBP) Culture, Anaerobic [[id number]] Collected: 06/21/23 0554    Order Status: Completed Specimen: Ascites Updated: 06/28/23 0849     Anaerobic Culture No anaerobes isolated    Narrative:      To rule out SBP order labs: Aerobic culture [BCK425],  Culture, Anaerobic [REM229], Gram stain  [UOC210], Albumin  [YGU737], Protein [DCJ315], LDH [EOO844], WBC \T\ Dff  [UTX6147].    Blood Culture #2 **CANNOT BE ORDERED STAT** [[id number]] Collected: 06/21/23 0237    Order Status: Completed Specimen: Blood from Peripheral, Antecubital, Left Updated: 06/26/23 0612     Blood Culture, Routine No growth after 5 days.    Blood Culture #1 **CANNOT BE ORDERED STAT** [[id number]] Collected: 06/21/23 0238    Order Status: Completed Specimen: Blood from Peripheral, Upper Arm, Right Updated: 06/26/23 0612     Blood Culture, Routine No growth after 5 days.    (rule out SBP) Aerobic culture [[id number]] Collected: 06/21/23 0554    Order Status: Completed Specimen: Ascites Updated: 06/24/23 1148     Aerobic Bacterial Culture No growth    Narrative:      To rule out SBP order labs: Aerobic culture [HPM889],  Culture, Anaerobic [CPX562], Gram stain [BLL891], Albumin  [ZPZ404], Protein [IEY532], LDH [KHG399], WBC \T\ Dff  [ESK8424].            Reviewed and noted in plan where applicable- Please see chart for full lab data.    Lines/Drains:       Peripheral IV - Single Lumen 06/26/23 1732 20 G Distal;Posterior;Right Forearm (Active)   Site Assessment Clean;Dry;Intact;No redness;No swelling 06/28/23 2010   Line Status Saline locked 06/28/23 2010   Dressing Status Clean;Dry;Intact 06/28/23 1557   Dressing Intervention Integrity maintained 06/28/23 1557   Number of days: 2            Hemodialysis AV Fistula Left upper arm (Active)   Needle Size 15ga 06/27/23 2050   Site Assessment Bleeding 06/28/23 0802   Patency Present;Thrill;Bruit 06/28/23 0802   Status Deaccessed 06/28/23 0802   Flows Good 06/27/23 2050   Dressing Intervention Sterile dressing change 06/28/23 0802   Dressing Status Clean;Dry;Intact 06/27/23 2050   Site Condition Bleeding;Drainage 06/28/23 0802   Dressing Gauze 06/27/23 2050   Number of days:        Imaging  ECG Results              EKG 12-lead (Final result)  Result time 06/21/23 08:42:19      Final result by  Interface, Lab In [facility] (06/21/23 08:42:19)               Narrative:    Test Reason : R41.82,    Vent. Rate : 057 BPM     Atrial Rate : 057 BPM     P-R Int : 266 ms          QRS Dur : 110 ms      QT Int : 534 ms       P-R-T Axes : 041 106 069 degrees     QTc Int : 519 ms    Sinus bradycardia with 1st degree A-V block  Rightward axis  Prolonged QT  Nonspecific T wave abnormality  Abnormal ECG  When compared with ECG of 14-JUN-2023 14:57,  Nonspecific T wave abnormality, worse in Anterior leads  Confirmed by JAZMINE SEWELL MD ([id number]) on 6/21/2023 8:42:12 AM    Referred By: [email or web address]   SELF           Confirmed By:JAZMINE SEWELL MD                                  Results for orders placed during the hospital encounter of 03/16/23    Echo    Interpretation Summary  · The left ventricle is normal in size with normal systolic function.  · The estimated ejection fraction is 55%.  · Grade II left ventricular diastolic dysfunction.  · Moderate right ventricular enlargement with moderately reduced right ventricular systolic function.  · Biatrial enlargement.  · Interatrial septum bows to left, consider elevated right atrial pressure.  · Mild tricuspid regurgitation.  · The estimated PA systolic pressure is 54 mmHg.  · There is pulmonary hypertension.  · Elevated central venous pressure (15 mmHg).  · Small circumferential pericardial effusion.  · There are bilateral pleural effusions.  · There is ascites present.      [facility] Hemodialysis Access  Indication  ========    End Stage Renal Disease on Dialysis    Dialysis Access  =============    Dialysis access location:  Left Arm  Type of AV access: Brachiobasilic AVF  Lt inflow A PSV    410 cm/s  Lt at anastomosis PSV  528 cm/s  Lt A distal anastomosis PSV    419 cm/s  Lt fistula prox PSV    117 cm/s  Lt fistula mid  cm/s  Lt fistula mid flow volume 3,394 ml/min  Lt fistula distal PSV  162 cm/s  Lt outflow V prox PSV  104 cm/s  Lt outflow V mid PSV   52 cm/s  Lt outflow V  distal PSV    66 cm/s    Impression  =========    Color flow duplex exam reveals a patent, tortuous and aneurysmal left Brachiobasilic AV fistula with no evidence of a  hemodynamically significant stenosis despite slightly elevated velocities distal to the anastomosis, this area appears patent (smaller  in caliber) without a focal narrowing. Volume flow at mid bicep level measures 3394 ml/min.    DATE OF SERVICE: 06/28/2023                                                      Sonographer: Laura Randhawa  Electronically Signed by: Lul Rojo M.D. at 06/28/2023-08:21      Labs, Imaging, EKG and Diagnostic results from 6/30/2023 were reviewed.    Medications:  Medication list was reviewed and changes noted under Assessment/Plan.  No current facility-administered medications on file prior to encounter.     Current Outpatient Medications on File Prior to Encounter   Medication Sig Dispense Refill    acetaminophen (TYLENOL) 650 MG TbSR Take 650 mg by mouth every 8 (eight) hours as needed (pain or fever over 100.4).      albuterol (PROVENTIL/VENTOLIN HFA) 90 mcg/actuation inhaler Inhale 2 puffs into the lungs 4 (four) times daily as needed (breathing).      albuterol-ipratropium (DUO-NEB) 2.5 mg-0.5 mg/3 mL nebulizer solution Take 3 mLs by nebulization every 4 (four) hours while awake. Rescue 6480 mL 0    apixaban (ELIQUIS) 5 mg Tab Take 5 mg by mouth 2 (two) times daily.      artificial tears (ISOPTO TEARS) 0.5 % ophthalmic solution Place 1 drop into both eyes 6 (six) times daily. 15 mL 0    aspirin (ECOTRIN) 81 MG EC tablet Take 81 mg by mouth once daily.      atorvastatin (LIPITOR) 40 MG tablet Take 1 tablet (40 mg total) by mouth once daily. (Patient taking differently: Take 40 mg by mouth every evening.) 90 tablet 3    calcium acetate,phosphat bind, (PHOSLO) 667 mg capsule Take 667 mg by mouth 3 (three) times daily.      carvediloL (COREG) 6.25 MG tablet Take 6.25 mg by mouth 2 (two) times daily.      cetirizine  (ZYRTEC) 10 MG tablet Take 10 mg by mouth daily as needed (itching).      cloNIDine (CATAPRES) 0.1 MG tablet Take 0.1 mg by mouth.      cloNIDine 0.3 mg/24 hr td ptwk (CATAPRES) 0.3 mg/24 hr APPLY ONE PATCH TOPICALLY EVERY WEEK FOR HIGH BLOOD PRESSURE      doxycycline (VIBRAMYCIN) 100 MG Cap Take 100 mg by mouth 2 (two) times daily.      epoetin xavier (PROCRIT) 10,000 unit/mL injection Inject 0.7 mLs (7,000 Units total) into the skin every Tues, Thurs, Sat.      erythromycin (ROMYCIN) ophthalmic ointment Place a 1/2 inch ribbon of ointment into the lower eyelid three times a day. (Patient taking differently: Place a 1/2 inch ribbon of ointment into the lower right eyelid three times a day.) 3.5 g 0    FLUoxetine 40 MG capsule Take 40 mg by mouth once daily.      fluticasone-salmeterol diskus inhaler 250-50 mcg Inhale 1 puff into the lungs 2 (two) times daily.      GLUCAGON EMERGENCY KIT, HUMAN, 1 mg injection 1 mg into the muscle as needed if CBG < 70      HYDROPHILIC CREAM TOP Apply liberal amount to affected area twice daily for dry skin      insulin detemir U-100, Levemir, 100 unit/mL (3 mL) SubQ InPn pen Inject 8 Units into the skin once daily. 7.2 mL 3    insulin detemir U-100, Levemir, 100 unit/mL (3 mL) SubQ InPn pen Inject 4 Units into the skin every evening. 3.6 mL 3    insulin lispro (HUMALOG KWIKPEN INSULIN) 100 unit/mL pen Inject 8 Units into the skin 3 (three) times daily before meals. 6 mL 11    isosorbide mononitrate (IMDUR) 30 MG 24 hr tablet Take 1 tablet (30 mg total) by mouth once daily. 90 tablet 3    lisinopriL (PRINIVIL,ZESTRIL) 40 MG tablet Take 1 tablet (40 mg total) by mouth every evening. 90 tablet 3    melatonin 3 mg TbDL Take 9 mg by mouth nightly as needed (sleep).      NIFEdipine (PROCARDIA-XL) 60 MG (OSM) 24 hr tablet Take 1 tablet (60 mg total) by mouth 2 (two) times a day. 60 tablet 11    nut.tx.imp.renal fxn,lac-reduc (NEPRO CARB STEADY ORAL) Give 1 carton by mouth with each meal.       ondansetron (ZOFRAN) 8 MG tablet Take 1 tablet by mouth 3 (three) times daily as needed for Nausea.      sevelamer carbonate (RENVELA) 800 mg Tab Take 800 mg by mouth 3 (three) times daily.      sodium chloride (SALINE NASAL) 0.65 % nasal spray 1 spray by Nasal route as needed (dry nostril). 30 mL 12    tiotropium bromide (SPIRIVA RESPIMAT) 2.5 mcg/actuation inhaler Inhale 2 puffs into the lungs Daily.      triamcinolone acetonide 0.025% (KENALOG) 0.025 % cream Apply topically 2 (two) times daily.      vitamin renal formula, B-complex-vitamin c-folic acid, (NEPHROCAP) 1 mg Cap Take 1 capsule by mouth once daily.      vits A and D-white pet-lanolin ointment Apply topically 2 (two) times daily. Apply twice daily to penis 15 g 0    white petrolatum (VASELINE) ointment Apply topically once daily. Apply small amount to both nostrils daily 5 g 0     Scheduled Medications:  sodium chloride 0.9%, , Intravenous, Once  apixaban, 5 mg, Oral, BID  artificial tears, 1 drop, Both Eyes, 6x Daily  aspirin, 81 mg, Oral, Daily  atorvastatin, 40 mg, Oral, Daily  carvediloL, 6.25 mg, Oral, BID  erythromycin, , Both Eyes, Q8H  FLUoxetine, 40 mg, Oral, Daily  fluticasone furoate-vilanteroL, 1 puff, Inhalation, Daily  insulin aspart U-100, 8 Units, Subcutaneous, TIDWM  insulin detemir U-100 (Levemir), 6 Units, Subcutaneous, BID  isosorbide mononitrate, 30 mg, Oral, Daily  lisinopriL, 40 mg, Oral, QHS  NIFEdipine, 60 mg, Oral, BID  sevelamer carbonate, 800 mg, Oral, TID  tiotropium bromide, 2 puff, Inhalation, Daily      PRN: albuterol-ipratropium, cetirizine, dextrose 10%, dextrose 10%, dextrose, dextrose, glucagon (human recombinant), glucose, glucose, heparin (porcine), insulin aspart U-100, melatonin, sodium chloride 0.9%, sodium chloride 0.9%, sodium chloride 0.9%  Infusions:   Estimated Creatinine Clearance: 15.8 mL/min (A) (based on SCr of 4.9 mg/dL (H)).    Assessment/Plan:      * Acute on chronic respiratory failure with  hypoxia and hypercapnia  Acute on chronic hypoxemic and hypercapnic respiratory failure- No PFts. Due to chronic HF/ESRD with volume overload. Reported h/o COPD. Off NIV with fluid removal with HD, continue supplemental oxygen.     6/20 on 4lnc     End-stage renal disease on hemodialysis    As above    AV graft malfunction  Bleeding from avf after HD 6/26  -concern for continued bleeding since dressings are wet with blood, also had transient nosebleed  -aspirin and Eliquis held 6/27   -stable h/h, elevated coags  -Vascular sx consult - US ordered  -monitor for recurrent bleeding after today's HD  -f/u Vascular sx recs    6/29 resumed ASA and eliquis per vascular surgery   6/30 HB stable on eliquis. discharge to NH with vasular sx follow up for LUE fistulogram Monday.  no need to hold ASA and eliquis per vascular surgery         Moderate malnutrition  Nutrition consulted. Most recent weight and BMI monitored-     Measurements:  Wt Readings from Last 1 Encounters:   06/28/23 69 kg (152 lb 1.9 oz)   Body mass index is 22.46 kg/m².    Patient has been screened and assessed by RD.    Malnutrition Type:  Context: chronic illness  Level: moderate    Malnutrition Characteristic Summary:  Weight Loss (Malnutrition): 10% in 6 months  Subcutaneous Fat (Malnutrition): moderate depletion  Muscle Mass (Malnutrition): moderate depletion    Interventions/Recommendations (treatment strategy):  1. Continue Diabetic diet, add Renal diet restrictions - change ONS to [facility]. 2. RD to monitor & follow-up.    Mastoiditis of left side  augmentin x 7 days      Acute on chronic diastolic heart failure  HFpEF - volume management with HD, gdmt         Hypertension    Chronic, controlled.  Latest blood pressure and vitals reviewed-   Temp:  [97.7 °F (36.5 °C)-98.7 °F (37.1 °C)]   Pulse:  [65-80]   Resp:  [18-20]   BP: (140-160)/(66-85)   SpO2:  [84 %-100 %] .   Home meds for hypertension were reviewed and noted below. Hospital  anti-hypertensive changes were made as shown below.  Hypertension Medications               carvediloL (COREG) 6.25 MG tablet Take 6.25 mg by mouth 2 (two) times daily.    isosorbide mononitrate (IMDUR) 30 MG 24 hr tablet Take 1 tablet (30 mg total) by mouth once daily.   PRN meds if BP> 180/110 mm HG. continue lisinopril  and procardia    COPD (chronic obstructive pulmonary disease)     Reported h/o COPD. Off NIV with fluid removal with HD, continue supplemental oxygen.          Type 2 diabetes mellitus with hyperglycemia, with long-term current use of insulin  Patient's FSGs are controlled on current hypoglycemics.   Last A1c reviewed-   Lab Results   Component Value Date    HGBA1C 10.7 (H) 06/21/2023    HGBA1C 9.5 (H) 03/06/2023    HGBA1C 7.5 (H) 12/20/2022     Will hold PO hypoglycemics and will start correctional scale insulin  Most recent fingerstick glucose reviewed-   Recent Labs   Lab 06/28/23  2034 06/29/23  0838 06/29/23  1144 06/29/23  1614   POCTGLUCOSE 270* 243* 301* 313*     currently on   Antihyperglycemics (From admission, onward)      Start     Stop Route Frequency Ordered    06/25/23 0715  insulin aspart U-100 pen 6 Units         -- SubQ 3 times daily with meals 06/24/23 1826    06/24/23 2100  insulin detemir U-100 (Levemir) pen 6 Units         -- SubQ 2 times daily 06/24/23 1827    06/24/23 0345  insulin aspart U-100 pen 1-10 Units         -- SubQ Every 4 hours PRN 06/24/23 0240            Malnutrition of mild degree    Nutrition consulted. Most recent weight and BMI monitored-      Measurements:      Wt Readings from Last 1 Encounters:   06/28/23 69 kg (152 lb 1.9 oz)   Body mass index is 22.46 kg/m².     Patient has been screened and assessed by RD.     Malnutrition Type:  Context: chronic illness  Level: moderate     Malnutrition Characteristic Summary:  Weight Loss (Malnutrition): 10% in 6 months  Subcutaneous Fat (Malnutrition): moderate depletion  Muscle Mass (Malnutrition): moderate  depletion     Interventions/Recommendations (treatment strategy):  1. Continue Diabetic diet, add Renal diet restrictions - change ONS to [facility]. 2. RD to monitor & follow-up.        HLD (hyperlipidemia)  on lipitor        Anemia in ESRD (end-stage renal disease)    Patient's with Normocytic anemia.. Hemoglobin stable. Etiology likely due to chronic disease .  Current CBC reviewed-  Recent Labs   Lab 06/27/23  1443 06/28/23  0446 06/29/23  0543   HGB 10.9* 10.1* 9.8*    Monitor CBC and transfuse if H/H drops below 7/21.     Multiple subsegmental pulmonary emboli without acute cor pulmonale  H/o PE- continue eliquis  - Eliquis held 6/27 due to bleeding avf, resume tomorrow if no further bleeding with HD today.       Chronic kidney disease-mineral and bone disorder        Chronic hypoxemic respiratory failure  as above   6/29 sats 84% on 4LNC.    Diabetes mellitus with chronic kidney disease on chronic dialysis  continue HD per nephro    ESRD (end stage renal disease)  Nephrology consulted for HD.        VTE Risk Mitigation (From admission, onward)           Ordered     apixaban tablet 5 mg  2 times daily         06/29/23 1553     heparin (porcine) injection 1,000 Units  As needed (PRN)         06/21/23 0602     IP VTE HIGH RISK PATIENT  Once         06/21/23 0222     Place sequential compression device  Until discontinued         06/21/23 0222                    Discharge Planning   GERA: 7/1/2023     Code Status: Full Code   Is the patient medically ready for discharge?: No    Reason for patient still in hospital (select all that apply): Treatment  Discharge Plan A: Return to nursing home ([facility] (resident))                  Jonathan Marie MD  Department of Hospital Medicine   [facility] - Telemetry Stepdown

## 2023-07-01 NOTE — NURSING
[facility] here to get pt. IV removed. Pt on 4L NC. All belongings and documents sent with pt. AVS completed.

## 2023-07-03 PROBLEM — E11.10 DIABETIC KETOACIDOSIS WITHOUT COMA: Status: RESOLVED | Noted: 2022-01-01 | Resolved: 2023-01-01

## 2023-07-06 NOTE — ASSESSMENT & PLAN NOTE
-- Patient is a poor historian.  -- Labs in 3 months.  -- A1c goal <7.5%.  -- Medications discussed:  Insulin   -- Reviewed logs/CGM:  Facility did not send in a glucose log and patient unable to recall SMBG checks.   Insufficient data.  Facility to send in glucose logs to make changes.   -- Medication Changes:   Insufficient data.     Levemir 8 units twice daily   Humalog 8 units plus correction scale before meals   Add correction scale as needed.  Blood sugar 201 to 250 add 2 units  Blood sugar 251 to 300 add 4 units  Blood sugar 301 to 350 add 6 units  Blood sugar greater than 350 add 8 units    Patient reports he is getting long-acting insulin 20 units nightly and 5 units fast acting before meals     -- Reviewed goals of therapy are to get the best control we can without hypoglycemia.  -- Reviewed patient's current insulin regimen. Clarified proper insulin dose and timing in relation to meals, etc. Insulin injection sites and proper rotation instructed.    -- Advised frequent self blood glucose monitoring.  Patient encouraged to document glucose results and bring them to every clinic visit.  -- Hypoglycemia precautions discussed. Instructed on precautions before driving.    -- Call for Bg repeatedly < 90 or > 180.   -- Close adherence to lifestyle changes recommended.   -- Periodic follow ups for eye evaluations, foot care and dental care suggested.

## 2023-07-13 NOTE — DISCHARGE INSTRUCTIONS
FISTULOGRAM      Before 7 AM, enter through the Emergency Entrance..   After 7 AM enter through the Main Entrance.        Your procedure  is scheduled for ____7/18/2023___Arrive at 930 am__________.    You may have one visitor.  No children allowed.      You will be going to the Same Day Surgery Unit on the 2nd floor of the hospital.    Important instructions:  Do not eat anything after midnight.  You may have water to take your medications.    Do not take any diabetic medication on the morning of surgery unless instructed to do so by your doctor or pre op nurse.    Contact lenses and removable denture work may not be worn during your procedure.    You may wear deodorant only.     Do not wear powder, body lotion, perfume/cologne or make-up.    Do not wear any jewelry or have any metal on your body.    You will be asked to remove any dentures or partials for the procedure.    If you are going home on the same day of surgery, you must arrange for a family member or a friend to drive you home.  Public transportation is prohibited.  You will not be able to drive home if you were given anesthesia or sedation.    Please leave money and valuables home.      You may bring your cell phone.    Call the doctor if fever or illness should occur before your surgery.    Call [phone] to contact us here if needed.

## 2023-07-13 NOTE — PLAN OF CARE
Current information obtained from nursing home. Discharge instructions faxed to Shalini at [facility].  Shalini given my office # for any questions.

## 2023-07-18 PROBLEM — Z86.711 HISTORY OF PULMONARY EMBOLISM: Status: ACTIVE | Noted: 2023-01-01

## 2023-07-18 PROBLEM — E16.2 HYPOGLYCEMIA: Status: ACTIVE | Noted: 2023-01-01

## 2023-07-18 PROBLEM — E11.9 TYPE 2 DIABETES MELLITUS TREATED WITH INSULIN: Status: ACTIVE | Noted: 2023-01-01

## 2023-07-18 PROBLEM — Z79.4 TYPE 2 DIABETES MELLITUS WITH HYPERGLYCEMIA, WITH LONG-TERM CURRENT USE OF INSULIN: Chronic | Status: RESOLVED | Noted: 2023-01-01 | Resolved: 2023-01-01

## 2023-07-18 PROBLEM — E11.65 TYPE 2 DIABETES MELLITUS WITH HYPERGLYCEMIA, WITH LONG-TERM CURRENT USE OF INSULIN: Chronic | Status: RESOLVED | Noted: 2023-01-01 | Resolved: 2023-01-01

## 2023-07-18 PROBLEM — Z79.4 TYPE 2 DIABETES MELLITUS TREATED WITH INSULIN: Status: ACTIVE | Noted: 2023-01-01

## 2023-07-18 NOTE — PLAN OF CARE
Spoke to Dr. Miller, informed him that the ED doctor would like a call with more information concerning pt, Dr. Miller stated he would call the ED

## 2023-07-18 NOTE — PLAN OF CARE
Called Dr. Miller with update on pt, CBG 61 despite intake, MD stated pt was to be transferred to ED

## 2023-07-18 NOTE — H&P
Chief Complaint/Reason for Admission: bleeding AVG after HD     History of Present Illness: Cosme Lewis is our 58 yo M with ESRD (MWF HD via LUE AV Graft), HFpEF (LVEF 55%, Grade II DD on echo 3/20/23), chronic RF on home O2, IDDM2, ESRD on HD, COPD, HTN, muliple PEs on Eliquis who is currently admitted to MICU for acute on chronic respiratory failure. He reports his AVG began bleeding after HD yesterday, unable to tell when it stopped bleeding. He denies pulsatile bleeding. Bleeding is currently controlled with pressure dressing. His Hgb is 10.9, stable from 11 prior to bleed.                Medications Prior to Admission   Medication Sig Dispense Refill Last Dose    acetaminophen (TYLENOL) 650 MG TbSR Take 650 mg by mouth every 8 (eight) hours as needed (pain or fever over 100.4).          albuterol (PROVENTIL/VENTOLIN HFA) 90 mcg/actuation inhaler Inhale 2 puffs into the lungs 4 (four) times daily as needed (breathing).          albuterol-ipratropium (DUO-NEB) 2.5 mg-0.5 mg/3 mL nebulizer solution Take 3 mLs by nebulization every 4 (four) hours while awake. Rescue 6480 mL 0      apixaban (ELIQUIS) 5 mg Tab Take 5 mg by mouth 2 (two) times daily.          artificial tears (ISOPTO TEARS) 0.5 % ophthalmic solution Place 1 drop into both eyes 6 (six) times daily. 15 mL 0      aspirin (ECOTRIN) 81 MG EC tablet Take 81 mg by mouth once daily.          atorvastatin (LIPITOR) 40 MG tablet Take 1 tablet (40 mg total) by mouth once daily. (Patient taking differently: Take 40 mg by mouth every evening.) 90 tablet 3      calcium acetate,phosphat bind, (PHOSLO) 667 mg capsule Take 667 mg by mouth 3 (three) times daily.          carvediloL (COREG) 6.25 MG tablet Take 6.25 mg by mouth 2 (two) times daily.          cetirizine (ZYRTEC) 10 MG tablet Take 10 mg by mouth daily as needed (itching).          cloNIDine (CATAPRES) 0.1 MG tablet Take 0.1 mg by mouth.          cloNIDine 0.3 mg/24 hr td ptwk (CATAPRES) 0.3 mg/24 hr APPLY  ONE PATCH TOPICALLY EVERY WEEK FOR HIGH BLOOD PRESSURE          doxycycline (VIBRAMYCIN) 100 MG Cap Take 100 mg by mouth 2 (two) times daily.          epoetin xavier (PROCRIT) 10,000 unit/mL injection Inject 0.7 mLs (7,000 Units total) into the skin every Tues, Thurs, Sat.          erythromycin (ROMYCIN) ophthalmic ointment Place a 1/2 inch ribbon of ointment into the lower eyelid three times a day. (Patient taking differently: Place a 1/2 inch ribbon of ointment into the lower right eyelid three times a day.) 3.5 g 0      FLUoxetine 40 MG capsule Take 40 mg by mouth once daily.          fluticasone-salmeterol diskus inhaler 250-50 mcg Inhale 1 puff into the lungs 2 (two) times daily.          GLUCAGON EMERGENCY KIT, HUMAN, 1 mg injection 1 mg into the muscle as needed if CBG < 70          HYDROPHILIC CREAM TOP Apply liberal amount to affected area twice daily for dry skin          insulin detemir U-100, Levemir, 100 unit/mL (3 mL) SubQ InPn pen Inject 8 Units into the skin once daily. 7.2 mL 3      insulin detemir U-100, Levemir, 100 unit/mL (3 mL) SubQ InPn pen Inject 4 Units into the skin every evening. 3.6 mL 3      insulin lispro (HUMALOG KWIKPEN INSULIN) 100 unit/mL pen Inject 8 Units into the skin 3 (three) times daily before meals. 6 mL 11      isosorbide mononitrate (IMDUR) 30 MG 24 hr tablet Take 1 tablet (30 mg total) by mouth once daily. 90 tablet 3      lisinopriL (PRINIVIL,ZESTRIL) 40 MG tablet Take 1 tablet (40 mg total) by mouth every evening. 90 tablet 3      melatonin 3 mg TbDL Take 9 mg by mouth nightly as needed (sleep).          NIFEdipine (PROCARDIA-XL) 60 MG (OSM) 24 hr tablet Take 1 tablet (60 mg total) by mouth 2 (two) times a day. 60 tablet 11      nut.tx.imp.renal fxn,lac-reduc (NEPRO CARB STEADY ORAL) Give 1 carton by mouth with each meal.          ondansetron (ZOFRAN) 8 MG tablet Take 1 tablet by mouth 3 (three) times daily as needed for Nausea.          sevelamer carbonate (RENVELA) 800  mg Tab Take 800 mg by mouth 3 (three) times daily.          sodium chloride (SALINE NASAL) 0.65 % nasal spray 1 spray by Nasal route as needed (dry nostril). 30 mL 12      tiotropium bromide (SPIRIVA RESPIMAT) 2.5 mcg/actuation inhaler Inhale 2 puffs into the lungs Daily.          triamcinolone acetonide 0.025% (KENALOG) 0.025 % cream Apply topically 2 (two) times daily.          vitamin renal formula, B-complex-vitamin c-folic acid, (NEPHROCAP) 1 mg Cap Take 1 capsule by mouth once daily.          vits A and D-white pet-lanolin ointment Apply topically 2 (two) times daily. Apply twice daily to penis 15 g 0      white petrolatum (VASELINE) ointment Apply topically once daily. Apply small amount to both nostrils daily 5 g 0           Review of patient's allergies indicates:  No Known Allergies          Past Medical History:   Diagnosis Date    Chronic kidney disease-mineral and bone disorder 10/12/2022    COPD (chronic obstructive pulmonary disease)      Diabetes mellitus type 1      ESRD on dialysis      Hypertension      Hypervolemia 2/22/2023    Hyponatremia 3/4/2023    SOB (shortness of breath) 10/12/2022     Patient with history COPD treated with Ellipta the albuterol, fluticasone-salmeterol tachypneic in the ED saturating 94% and 6 L on nasal cannula, patient does not know how many  he uses it is in nursing home.    -duo nebs Q 4  Given that patient is a poor historian, and in the setting of left lower extremity edema and history of coagulopathy PE cannot be ruled out.  Will workup for possible infec            Past Surgical History:   Procedure Laterality Date    AV FISTULA PLACEMENT          Family History         Problem Relation (Age of Onset)     Diabetes Mother                  Tobacco Use    Smoking status: Never    Smokeless tobacco: Never   Substance and Sexual Activity    Alcohol use: Not Currently    Drug use: Not on file    Sexual activity: Not Currently      Review of Systems   Constitutional:   Negative for chills and fever.   Eyes:  Negative for visual disturbance.   Respiratory:  Negative for shortness of breath.    Cardiovascular:  Negative for chest pain.        Bleeding from LUE AV graft   Gastrointestinal:  Negative for abdominal pain, blood in stool, constipation, diarrhea, nausea and vomiting.   Genitourinary:  Negative for dysuria and hematuria.   Musculoskeletal:  Negative for back pain.   Allergic/Immunologic: Negative for environmental allergies.   Neurological:  Negative for dizziness, weakness and light-headedness.   Objective:      Vital Signs (Most Recent):  Temp: 97.4 °F (36.3 °C) (06/27/23 1602)  Pulse: 63 (06/27/23 1602)  Resp: 16 (06/27/23 1602)  BP: (!) 146/78 (06/27/23 1602)  SpO2: 95 % (06/27/23 1602) Vital Signs (24h Range):  Temp:  [97.4 °F (36.3 °C)-98.4 °F (36.9 °C)] 97.4 °F (36.3 °C)  Pulse:  [63-81] 63  Resp:  [16-20] 16  SpO2:  [90 %-99 %] 95 %  BP: (128-149)/(73-83) 146/78      Weight: 69 kg (152 lb 1.9 oz)  Body mass index is 22.46 kg/m².      Physical Exam  Constitutional:       General: He is not in acute distress.     Appearance: Normal appearance. He is not ill-appearing.   Cardiovascular:      Rate and Rhythm: Normal rate and regular rhythm.      Comments: LUE AVG graft with palpable thrill  No skin breakdown, prior access sites from IV notable  2+ radial pulse, faintly palpable ulnar pulse  Pulmonary:      Effort: Pulmonary effort is normal. No respiratory distress.      Breath sounds: Normal breath sounds.   Abdominal:      General: Abdomen is flat.      Palpations: Abdomen is soft.   Musculoskeletal:         General: Normal range of motion.   Skin:     General: Skin is warm and dry.   Neurological:      General: No focal deficit present.      Mental Status: He is alert and oriented to person, place, and time. Mental status is at baseline.         Significant Labs:  CBC:        Recent Labs   Lab 06/27/23  0457 06/27/23  1443   WBC 2.87*  --    RBC 3.45*  --    HGB  10.1* 10.9*   HCT 32.6* 34.5*     --    MCV 95  --    MCH 29.3  --    MCHC 31.0*  --       CMP:       Recent Labs   Lab 06/27/23  0457   GLU 73   CALCIUM 7.6*   ALBUMIN 2.8*   PROT 5.9*   *   K 5.0   CO2 24      BUN 39*   CREATININE 4.0*   ALKPHOS 161*   ALT 19   AST 26   BILITOT 0.5         Significant Diagnostics:  I have reviewed all pertinent imaging results/findings within the past 24 hours.     Assessment/Plan:      AV graft malfunction  Cosme Lewis is a 58 yo M with ESRD on HD MWF with LUE AV graft, HFpEF (LVEF 55%, Grade II DD on echo 3/20/23), chronic RF on home O2, IDDM2, ESRD on HD, COPD, HTN, muliple PEs on Eliquis now with bleeding from LUE AVG after HD 6/26. May have outflow stenosis of AVG    -Plan for L transradial fistulogram 7/18/23

## 2023-07-18 NOTE — ED TRIAGE NOTES
Pt from \[facility]\"" due to hypoglycemia. Per \[facility]\"" nurse, pt's CBG was 36 post procedure, given 12.5 g of D50 at 1430, cbg increased then decreased again 30 min later. Pt received another 12.5g of D50, CBG was 136 but continued to drop even after pt drank juice. Pt is lethargic and awakens to painful stimuli

## 2023-07-18 NOTE — PLAN OF CARE
Pt transported by hospital bed to ED with all belongings, O2 @3L, AAOX3, unable to reach anyone at [facility]

## 2023-07-18 NOTE — PLAN OF CARE
Spoke with Halle, nurse manager at [facility], reviewed NPO status and med review. States pt did not get morning meds this AM. All [facility] meds taken yesterday. Updated in chart.

## 2023-07-18 NOTE — OP NOTE
Date: 07/18/2023     Surgeon(s) and Role:   Duong Aceves MD    Assistant: None    Pre-op Diagnosis:   1. T82.858A: Stenosis of vascular prosthetic devices, implants and grafts, initial encounter  2.   Patient Active Problem List    Diagnosis Date Noted    End-stage renal disease on hemodialysis 06/28/2023    AV graft malfunction 06/27/2023    Acute on chronic respiratory failure with hypoxia and hypercapnia 06/21/2023    Acute on chronic diastolic heart failure 06/21/2023    Mastoiditis of left side 06/21/2023    Moderate malnutrition 06/21/2023    Moderate nonproliferative diabetic retinopathy of both eyes with macular edema associated with type 2 diabetes mellitus 05/16/2023    Retinal tear, right 05/16/2023    Age-related nuclear cataract of both eyes 05/16/2023    Secondary hyperparathyroidism of renal origin 05/09/2023    ESRD on hemodialysis 03/31/2023    Shortness of breath 03/23/2023    Hypertension 03/23/2023    Epistaxis 03/22/2023    Scleral hemorrhage of left eye 03/19/2023    Hyperkalemia 03/17/2023    Penis abrasion, initial encounter 03/17/2023    COPD (chronic obstructive pulmonary disease) 03/15/2023    Leukopenia 03/11/2023    AVF (arteriovenous fistula) 03/11/2023    Orbital edema 03/10/2023    Type 2 diabetes mellitus with hyperglycemia, with long-term current use of insulin 03/07/2023    Hypertensive emergency 03/04/2023    Elevated liver enzymes 03/04/2023    Class 1 obesity in adult 02/22/2023    Hyperglycemia 01/05/2023    Malnutrition of mild degree 12/21/2022    Hypertensive urgency 11/21/2022    Agitation 11/19/2022    Anemia in ESRD (end-stage renal disease) 11/18/2022    Renovascular hypertension 11/18/2022    HLD (hyperlipidemia) 11/18/2022    Leg wound, left 10/14/2022    Multiple subsegmental pulmonary emboli without acute cor pulmonale 10/13/2022    Cellulitis of left lower extremity 10/12/2022    Diabetes mellitus with chronic kidney disease on chronic dialysis 10/12/2022     Chronic hypoxemic respiratory failure 10/12/2022    QT prolongation 10/12/2022    Chronic kidney disease-mineral and bone disorder 10/12/2022    Other emphysema 10/12/2022    Gram-positive bacteremia 10/12/2022    Chronic diastolic heart failure 10/12/2022    ESRD (end stage renal disease)           Post-op Diagnosis: Same     Procedure(s):   1. US guided L radial artery access  2. LUE fistulogram  3. Central venogram  4. Moderate sedation    Anesthesia: Local MAC     Findings/Key Components:   no flow limiting stenosis, small caliber flow channel proximal fistula, large PSA mid AVF  Strong palpable thrill     EBL: Minimal    PROCEDURE IN DETAIL:After an informed consent was obtained the patient was taken to the room and placed in the supine position.  Arm was prepped and draped in the standard surgical fashion. Under ultrasound guidance, the L radial artery was accessed with a micropuncture needle; ultrasound confirmed vessel patency, followed by placement of 4/3-Divehi micropuncture dilator. Through this, an 0.035-inch wire was placed in the short 6-Divehi sheath.   A fistulogram and central venogram was performed.  After independent review and interpretation, based upon the fistulogram results, I decided not to intervene.  Strong thrill could be felt. The sheath was removed, a vasc band was placed with good hemostasis.    MODERATE SEDATION   I was present for and monitored the patients cardio respiratory functions during the moderate sedation. See nurses notes for Intra-service start and end times, the medications their doseage and route.    Time of sedation:  35 mins.

## 2023-07-18 NOTE — BRIEF OP NOTE
[facility] - Interventional Radiology  Brief Operative Note    Surgery Date: 7/18/23    Surgeon: Duong Aceves MD    Assisting: Sanya Ruelas MS3    Pre-op Diagnosis:  Bleeding LUE AV fistula    Post-op Diagnosis:  same    Procedure: LUE fistulogram, radial access    Anesthesia: Moderate sedation    Operative Findings: no flow limiting stenosis, small caliber flow channel proximal fistula, large PSA mid AVF    Estimated Blood Loss: <10 cc         Specimens:   Specimen (24h ago, onward)      None              Discharge Note    OUTCOME: Patient tolerated treatment/procedure well without complication and is now ready for discharge.    DISPOSITION: Home or Self Care    FINAL DIAGNOSIS:  <principal problem not specified>    FOLLOWUP: In clinic    DISCHARGE INSTRUCTIONS:    Discharge Procedure Orders   Remove dressing in 48 hours     Activity as tolerated

## 2023-07-19 NOTE — NURSING
[facility]  Nurses Note -- 4 Eyes      7/18/2023      Skin assessed on: Admit      [x] No Pressure Injuries Present    [x]Prevention Measures Documented    [] Yes LDA  for Pressure Injury Previously documented     [] Yes New Pressure Injury Discovered   [] LDA for New Pressure Injury Added      Attending RN:  Christina Kohler RN     Second RN:  Charlette CARTER RN

## 2023-07-19 NOTE — ASSESSMENT & PLAN NOTE
Presents with recurrent hypoglycemia to as low at 37 after being sent from [facility] after fistulogram. Discussed with night nurse at NH she is unsure of when patient got last dose of insulin but believes was this AM. At baseline oriented to person/place.   Continue D10 infusion at 25cc/hr  POCT glucose Q4  Repeat A1c

## 2023-07-19 NOTE — ASSESSMENT & PLAN NOTE
H&H stable and consistent with baseline. No complaints of active bleeding. No indication for transfusion at this time

## 2023-07-19 NOTE — ASSESSMENT & PLAN NOTE
Patient's FSGs are uncontrolled due to hypoglycemia on current medication regimen.  Last A1c reviewed-   Lab Results   Component Value Date    HGBA1C 10.7 (H) 06/21/2023     Most recent fingerstick glucose reviewed-   Recent Labs   Lab 07/18/23  1733 07/18/23  1758 07/18/23  1919 07/18/23 2005   POCTGLUCOSE 37* 146* 64* 71     Current correctional scale  not currently on insulin as hypoglycemic  Increase anti-hyperglycemic dose as follows-   Antihyperglycemics (From admission, onward)    None        Hold Oral hypoglycemics while patient is in the hospital.

## 2023-07-19 NOTE — SUBJECTIVE & OBJECTIVE
Past Medical History:   Diagnosis Date    CHF (congestive heart failure)     Chronic kidney disease-mineral and bone disorder 10/12/2022    Chronic respiratory failure     COPD (chronic obstructive pulmonary disease)     Diabetes mellitus type 1     ESRD on dialysis     Hypertension     Hypervolemia 02/22/2023    Hyponatremia 03/04/2023    Other insomnia     Recurrent major depression     SOB (shortness of breath) 10/12/2022    Patient with history COPD treated with Ellipta the albuterol, fluticasone-salmeterol tachypneic in the ED saturating 94% and 6 L on nasal cannula, patient does not know how many  he uses it is in nursing home.    -duo nebs Q 4  Given that patient is a poor historian, and in the setting of left lower extremity edema and history of coagulopathy PE cannot be ruled out.  Will workup for possible infec    Unspecified lack of coordination        Past Surgical History:   Procedure Laterality Date    AV FISTULA PLACEMENT         Review of patient's allergies indicates:  No Known Allergies    Current Facility-Administered Medications on File Prior to Encounter   Medication    ceFAZolin (ANCEF) 1 gram in dextrose 5 % 50 mL IVPB (premix)    [COMPLETED] dextrose 50 % in water (D50W) injection 12.5 g    [COMPLETED] dextrose 50 % in water (D50W) injection 12.5 g    dextrose 50 % in water (D50W) injection 25 g    fentaNYL 50 mcg/mL injection    [COMPLETED] hydrALAZINE injection 10 mg    [COMPLETED] hydrALAZINE injection 10 mg    [COMPLETED] iohexoL (OMNIPAQUE 350) injection 30 mL    midazolam (VERSED) 1 mg/mL injection    [DISCONTINUED] dextrose 50 % in water (D50W) injection 25 g     Current Outpatient Medications on File Prior to Encounter   Medication Sig    acetaminophen (TYLENOL) 650 MG TbSR Take 650 mg by mouth every 8 (eight) hours as needed (pain or fever over 100.4).    albuterol (PROVENTIL/VENTOLIN HFA) 90 mcg/actuation inhaler Inhale 2 puffs into the lungs 4 (four) times daily as needed  (breathing).    albuterol-ipratropium (DUO-NEB) 2.5 mg-0.5 mg/3 mL nebulizer solution Take 3 mLs by nebulization every 4 (four) hours while awake. Rescue    apixaban (ELIQUIS) 5 mg Tab Take 5 mg by mouth 2 (two) times daily.    artificial tears (ISOPTO TEARS) 0.5 % ophthalmic solution Place 1 drop into both eyes 6 (six) times daily.    aspirin (ECOTRIN) 81 MG EC tablet Take 81 mg by mouth once daily.    atorvastatin (LIPITOR) 40 MG tablet Take 1 tablet (40 mg total) by mouth once daily. (Patient taking differently: Take 40 mg by mouth every evening.)    carvediloL (COREG) 6.25 MG tablet Take 3.125 mg by mouth 2 (two) times daily.    cetirizine (ZYRTEC) 10 MG tablet Take 10 mg by mouth daily as needed (itching).    cloNIDine 0.3 mg/24 hr td ptwk (CATAPRES) 0.3 mg/24 hr APPLY ONE PATCH TOPICALLY EVERY WEEK FOR HIGH BLOOD PRESSURE    epoetin xavier (PROCRIT) 10,000 unit/mL injection Inject 0.7 mLs (7,000 Units total) into the skin every Tues, Thurs, Sat.    FLUoxetine 40 MG capsule Take 40 mg by mouth once daily.    fluticasone-salmeterol diskus inhaler 250-50 mcg Inhale 1 puff into the lungs 2 (two) times daily.    GLUCAGON EMERGENCY KIT, HUMAN, 1 mg injection 1 mg into the muscle as needed if CBG < 70    HYDROPHILIC CREAM TOP Apply liberal amount to affected area twice daily for dry skin    insulin detemir U-100, Levemir, 100 unit/mL (3 mL) SubQ InPn pen Inject 8 Units into the skin once daily. (Patient taking differently: Inject 8 Units into the skin 2 (two) times a day.)    insulin lispro (HUMALOG KWIKPEN INSULIN) 100 unit/mL pen Inject 8 Units into the skin 3 (three) times daily before meals.    isosorbide mononitrate (IMDUR) 30 MG 24 hr tablet Take 1 tablet (30 mg total) by mouth once daily.    lisinopriL (PRINIVIL,ZESTRIL) 40 MG tablet Take 1 tablet (40 mg total) by mouth every evening.    melatonin 3 mg TbDL Take 9 mg by mouth nightly as needed (sleep).    NIFEdipine (PROCARDIA-XL) 60 MG (OSM) 24 hr tablet Take 1  tablet (60 mg total) by mouth 2 (two) times a day.    nut.tx.imp.renal fxn,lac-reduc (NEPRO CARB STEADY ORAL) Give 1 carton by mouth with each meal.    ondansetron (ZOFRAN) 8 MG tablet Take 1 tablet by mouth 3 (three) times daily as needed for Nausea.    sevelamer carbonate (RENVELA) 800 mg Tab Take 800 mg by mouth 3 (three) times daily.    sodium chloride (SALINE NASAL) 0.65 % nasal spray 1 spray by Nasal route as needed (dry nostril).    tiotropium bromide (SPIRIVA RESPIMAT) 2.5 mcg/actuation inhaler Inhale 2 puffs into the lungs Daily.    umeclidinium (INCRUSE ELLIPTA) 62.5 mcg/actuation inhalation capsule Inhale 62.5 mcg into the lungs once daily. Controller    vitamin renal formula, B-complex-vitamin c-folic acid, (NEPHROCAP) 1 mg Cap Take 1 capsule by mouth once daily.    white petrolatum (VASELINE) ointment Apply topically once daily. Apply small amount to both nostrils daily     Family History       Problem Relation (Age of Onset)    Diabetes Mother          Tobacco Use    Smoking status: Never    Smokeless tobacco: Never   Substance and Sexual Activity    Alcohol use: Not Currently    Drug use: Not on file    Sexual activity: Not Currently     Review of Systems   Constitutional:  Negative for chills and fever.   HENT:  Negative for nosebleeds and tinnitus.    Eyes:  Negative for photophobia and visual disturbance.   Respiratory:  Negative for shortness of breath and wheezing.    Cardiovascular:  Negative for chest pain, palpitations and leg swelling.   Gastrointestinal:  Negative for abdominal distention, nausea and vomiting.   Genitourinary:  Negative for dysuria, flank pain and hematuria.   Musculoskeletal:  Negative for gait problem and joint swelling.   Skin:  Negative for rash and wound.   Neurological:  Negative for seizures and syncope.   Objective:     Vital Signs (Most Recent):  Temp: 98 °F (36.7 °C) (07/18/23 2140)  Pulse: 65 (07/18/23 2140)  Resp: 18 (07/18/23 2140)  BP: (!) 186/97 (provider  aware; awaiting med orders) (07/18/23 2140)  SpO2: 98 % (07/18/23 2140) Vital Signs (24h Range):  Temp:  [97.4 °F (36.3 °C)-98 °F (36.7 °C)] 98 °F (36.7 °C)  Pulse:  [58-65] 65  Resp:  [12-29] 18  SpO2:  [91 %-100 %] 98 %  BP: (139-206)/() 186/97     Weight: 68 kg (150 lb)  Body mass index is 22.15 kg/m².     Physical Exam  Vitals and nursing note reviewed.   Constitutional:       General: He is not in acute distress.     Appearance: He is well-developed. He is not diaphoretic.   HENT:      Head: Normocephalic and atraumatic.      Right Ear: External ear normal.      Left Ear: External ear normal.   Eyes:      General:         Right eye: No discharge.         Left eye: No discharge.      Conjunctiva/sclera: Conjunctivae normal.   Neck:      Thyroid: No thyromegaly.   Cardiovascular:      Rate and Rhythm: Normal rate and regular rhythm.      Heart sounds: No murmur heard.  Pulmonary:      Effort: Pulmonary effort is normal. No respiratory distress.      Breath sounds: Normal breath sounds.      Comments: On supplemental oxygen speaking in full sentences  Chest:      Chest wall: No tenderness.   Abdominal:      General: Bowel sounds are normal. There is no distension.      Palpations: Abdomen is soft. There is no mass.      Tenderness: There is no abdominal tenderness.   Musculoskeletal:         General: No deformity.      Cervical back: Normal range of motion.      Right lower leg: No edema.      Left lower leg: No edema.      Comments: Fistula left arm with palpable thrill   Skin:     General: Skin is warm and dry.   Neurological:      Mental Status: He is alert.      Comments: Oriented to person and place. Moves all extremities against gravity   Psychiatric:         Mood and Affect: Mood normal.         Behavior: Behavior normal.              Significant Labs: CBC:   Recent Labs   Lab 07/18/23  1835   WBC 2.05*   HGB 10.8*   HCT 34.2*        CMP:   Recent Labs   Lab 07/18/23  1442 07/18/23  2042   NA  134* 136   K 3.6 3.8   CL 98 97   CO2 28 29   * 71   BUN 28* 29*   CREATININE 4.2* 4.4*   CALCIUM 7.7* 8.2*   PROT  --  6.8   ALBUMIN  --  3.2*   BILITOT  --  0.7   ALKPHOS  --  184*   AST  --  22   ALT  --  16   ANIONGAP 8 10     Cardiac Markers:   Recent Labs   Lab 07/18/23 1835   BNP 3,393*     Coagulation:   Recent Labs   Lab 07/18/23 1835   INR 1.5*     Lactic Acid:   Recent Labs   Lab 07/18/23  2042   LACTATE 1.0     Troponin:   Recent Labs   Lab 07/18/23 1835   TROPONINI 0.025       Significant Imaging:   Imaging Results               CT Head Without Contrast (Final result)  Result time 07/18/23 19:02:10      Final result by Jj Kahn MD (07/18/23 19:02:10)                   Impression:      1. No acute intracranial process.  2. Involutional changes with chronic microvascular ischemic changes and small remote lacunar infarct of the right frontal periventricular white matter.  3. Paranasal sinus disease with air-fluid levels could represent acute sinusitis.  Recommend clinical correlation and follow-up.  4. Mild bilateral mastoid air cell disease also.  5.  This report was flagged in Epic as abnormal.      Electronically signed by: Jj Kahn  Date:    07/18/2023  Time:    19:02               Narrative:    EXAMINATION:  CT HEAD WITHOUT CONTRAST    CLINICAL HISTORY:  Mental status change, unknown cause;    TECHNIQUE:  Low dose axial CT images obtained throughout the head without intravenous contrast. Sagittal and coronal reconstructions were performed.    COMPARISON:  06/20/2023    FINDINGS:  Intracranial compartment:    Ventricles and sulci are normal in size for age without evidence of hydrocephalus. No extra-axial blood or fluid collections.    Moderate involutional changes and chronic microvascular ischemic changes in the periventricular white matter.  Small remote lacunar infarct in the right frontal periventricular white matter.    No parenchymal mass, hemorrhage, edema or major  vascular distribution infarct.    Skull/extracranial contents (limited evaluation): No fracture. Moderate-severe bilateral maxillary and ethmoid sinus disease with air-fluid levels could represent acute sinusitis.  Mild left sphenoid sinus disease also.    Mild bilateral mastoid air cell disease also.                                       X-Ray Chest AP Portable (Final result)  Result time 07/18/23 19:07:26      Final result by Opal Zuñiga MD (07/18/23 19:07:26)                   Impression:      Overall findings concerning for CHF.  A pneumonic process cannot be entirely excluded.  Recommend clinical correlation.      Electronically signed by: Opal Zuñiga  Date:    07/18/2023  Time:    19:07               Narrative:    EXAMINATION:  AP PORTABLE CHEST    CLINICAL HISTORY:  SOB;    TECHNIQUE:  AP portable chest radiograph was submitted.    COMPARISON:  06/20/2023    FINDINGS:  AP portable chest radiograph demonstrates enlargement the cardiac silhouette.  There is pulmonary vascular congestion.  Fluid is seen along the minor fissure.  There are probable small to moderate bilateral pleural effusions.  A linear plate of atelectasis or scarring is seen at the right lung base.

## 2023-07-19 NOTE — ED PROVIDER NOTES
Encounter Date: 7/18/2023       History     Chief Complaint   Patient presents with    Hypoglycemia     Pt from [facility] due to hypoglycemia. Per [facility] nurse, pt's CBG was 36 post procedure, given 12.5 g of D50 at 1430, cbg increased then decreased again 30 min later. Pt received another 12.5g of D50, CBG was 136 but continued to drop even after pt drank juice. Pt is lethargic and awakens to painful stimuli     Brief history of present illness:  57-year-old male with multiple medical problems who is a resident at [facility] nursing home was sent down via hospital transporter from outpatient surgery to the emergency room.  I was given no report other than because the patient's blood sugar has been low.  Patient was lethargic on arrival and blood sugar 37 on arrival to the emergency room.  Patient is noncontributory to history.  I spoke with Dr. Aceves with vascular surgery who states the patient was there today for a fistulogram due to leaking of his dialysis access.  The fistulogram was normal.  He apparently was hyperglycemic after the procedure.  Received 12.5 g of dextrose IV and had some juice and crackers.  Was observed with persistent hypoglycemia.  Patient 1st vital signs were here the hospital at 9:30 a.m. this morning.  Patient was NPO for procedure.  Per vascular surgeon patient did complete dialysis yesterday.  Patient has a Monday Wednesday Friday dialysis schedule.  I attempted to call the nursing home however was not able to get the answers of when and what medicines he was given an lens last time he ate.  I spoke to the patient's mother.  She was not aware he was at the hospital.  She states that his baseline mental status is normal although did not provide specifics of what this is.      Review of patient's allergies indicates:  No Known Allergies  Past Medical History:   Diagnosis Date    CHF (congestive heart failure)     Chronic kidney disease-mineral and bone disorder 10/12/2022    Chronic  respiratory failure     COPD (chronic obstructive pulmonary disease)     Diabetes mellitus type 1     ESRD on dialysis     Hypertension     Hypervolemia 02/22/2023    Hyponatremia 03/04/2023    Other insomnia     Recurrent major depression     SOB (shortness of breath) 10/12/2022    Patient with history COPD treated with Ellipta the albuterol, fluticasone-salmeterol tachypneic in the ED saturating 94% and 6 L on nasal cannula, patient does not know how many  he uses it is in nursing home.    -duo nebs Q 4  Given that patient is a poor historian, and in the setting of left lower extremity edema and history of coagulopathy PE cannot be ruled out.  Will workup for possible infec    Unspecified lack of coordination      Past Surgical History:   Procedure Laterality Date    AV FISTULA PLACEMENT       Family History   Problem Relation Age of Onset    Diabetes Mother      Social History     Tobacco Use    Smoking status: Never    Smokeless tobacco: Never   Substance Use Topics    Alcohol use: Not Currently     Review of Systems   Unable to perform ROS: Mental status change     Physical Exam     Initial Vitals   BP Pulse Resp Temp SpO2   07/18/23 1739 07/18/23 1739 07/18/23 1741 07/18/23 1741 07/18/23 1739   (!) 187/87 65 13 98 °F (36.7 °C) (!) 91 %      MAP       --                Physical Exam    Nursing note and vitals reviewed.  Constitutional: He appears well-developed and well-nourished. He appears distressed.   HENT:   Head: Atraumatic.   Eyes: EOM are normal. Pupils are equal, round, and reactive to light.   Neck: Neck supple. JVD present.   Normal range of motion.  Cardiovascular:  Normal rate, regular rhythm, normal heart sounds and intact distal pulses.     Exam reveals no gallop and no friction rub.       No murmur heard.  Pulmonary/Chest: He has rales.   Abdominal: Abdomen is soft. Bowel sounds are normal. He exhibits no distension. There is no abdominal tenderness. There is no rebound.   Musculoskeletal:       Cervical back: Normal range of motion and neck supple.      Comments: Bilateral lower extremity weakness       Lymphadenopathy:     He has no cervical adenopathy.   Neurological: He is alert. GCS score is 15. GCS eye subscore is 4. GCS verbal subscore is 5. GCS motor subscore is 6.   GCS 14- 1 for confusion.   Skin: Skin is warm and dry. Capillary refill takes less than 2 seconds.       ED Course   Critical Care    Date/Time: 7/18/2023 8:54 PM  Performed by: Jhonny Marshall MD  Authorized by: Jhonny Marshall MD   Direct patient critical care time: 20 minutes  Additional history critical care time: 5 minutes  Ordering / reviewing critical care time: 10 minutes  Documentation critical care time: 10 minutes  Consulting other physicians critical care time: 10 minutes  Total critical care time (exclusive of procedural time) : 55 minutes  Critical care time was exclusive of teaching time and separately billable procedures and treating other patients.  Critical care was necessary to treat or prevent imminent or life-threatening deterioration of the following conditions: metabolic crisis and respiratory failure.  Critical care was time spent personally by me on the following activities: development of treatment plan with patient or surrogate, discussions with consultants, evaluation of patient's response to treatment, examination of patient, obtaining history from patient or surrogate, ordering and performing treatments and interventions, ordering and review of laboratory studies, ordering and review of radiographic studies, pulse oximetry, re-evaluation of patient's condition and review of old charts.      Labs Reviewed   CBC W/ AUTO DIFFERENTIAL - Abnormal; Notable for the following components:       Result Value    WBC 2.05 (*)     RBC 3.72 (*)     Hemoglobin 10.8 (*)     Hematocrit 34.2 (*)     MCHC 31.6 (*)     RDW 15.0 (*)     Gran # (ANC) 1.2 (*)     Lymph # 0.5 (*)     All other components within normal  limits   PROTIME-INR - Abnormal; Notable for the following components:    Prothrombin Time 15.1 (*)     INR 1.5 (*)     All other components within normal limits   B-TYPE NATRIURETIC PEPTIDE - Abnormal; Notable for the following components:    BNP 3,393 (*)     All other components within normal limits   COMPREHENSIVE METABOLIC PANEL - Abnormal; Notable for the following components:    BUN 29 (*)     Creatinine 4.4 (*)     Calcium 8.2 (*)     Albumin 3.2 (*)     Alkaline Phosphatase 184 (*)     eGFR 15 (*)     All other components within normal limits   POCT GLUCOSE - Abnormal; Notable for the following components:    POCT Glucose 37 (*)     All other components within normal limits   POCT GLUCOSE - Abnormal; Notable for the following components:    POCT Glucose 146 (*)     All other components within normal limits   POCT GLUCOSE - Abnormal; Notable for the following components:    POCT Glucose 64 (*)     All other components within normal limits   CULTURE, BLOOD    Narrative:     Collection has been rescheduled by NGL at 07/18/2023 18:51 Reason:   Unable to collect. RN pulled rest of labs from line.  Collection has been rescheduled by NGL at 07/18/2023 18:51 Reason:   Unable to collect. RN pulled rest of labs from line.   CULTURE, BLOOD    Narrative:     Collection has been rescheduled by NGL at 07/18/2023 18:51 Reason:   Unable to collect. RN pulled rest of labs from line.  Collection has been rescheduled by NGL at 07/18/2023 18:51 Reason:   Unable to collect. RN pulled rest of labs from line.   TROPONIN I   MAGNESIUM   PHOSPHORUS   LACTIC ACID, PLASMA   URINALYSIS, REFLEX TO URINE CULTURE   POCT GLUCOSE   POCT GLUCOSE   POCT GLUCOSE MONITORING CONTINUOUS   POCT GLUCOSE MONITORING CONTINUOUS     EKG Readings: (Independently Interpreted)   Normal sinus rhythm with first-degree AV block.  rate of 61 prolonged QTC of 537.  Low voltage.  Right axis deviation     Imaging Results               CT Head Without Contrast  (Final result)  Result time 07/18/23 19:02:10      Final result by Jj Kahn MD (07/18/23 19:02:10)                   Impression:      1. No acute intracranial process.  2. Involutional changes with chronic microvascular ischemic changes and small remote lacunar infarct of the right frontal periventricular white matter.  3. Paranasal sinus disease with air-fluid levels could represent acute sinusitis.  Recommend clinical correlation and follow-up.  4. Mild bilateral mastoid air cell disease also.  5.  This report was flagged in Epic as abnormal.      Electronically signed by: jJ Kahn  Date:    07/18/2023  Time:    19:02               Narrative:    EXAMINATION:  CT HEAD WITHOUT CONTRAST    CLINICAL HISTORY:  Mental status change, unknown cause;    TECHNIQUE:  Low dose axial CT images obtained throughout the head without intravenous contrast. Sagittal and coronal reconstructions were performed.    COMPARISON:  06/20/2023    FINDINGS:  Intracranial compartment:    Ventricles and sulci are normal in size for age without evidence of hydrocephalus. No extra-axial blood or fluid collections.    Moderate involutional changes and chronic microvascular ischemic changes in the periventricular white matter.  Small remote lacunar infarct in the right frontal periventricular white matter.    No parenchymal mass, hemorrhage, edema or major vascular distribution infarct.    Skull/extracranial contents (limited evaluation): No fracture. Moderate-severe bilateral maxillary and ethmoid sinus disease with air-fluid levels could represent acute sinusitis.  Mild left sphenoid sinus disease also.    Mild bilateral mastoid air cell disease also.                                       X-Ray Chest AP Portable (Final result)  Result time 07/18/23 19:07:26      Final result by Opal Zuñiga MD (07/18/23 19:07:26)                   Impression:      Overall findings concerning for CHF.  A pneumonic process cannot be entirely  excluded.  Recommend clinical correlation.      Electronically signed by: Opal Zuñiga  Date:    07/18/2023  Time:    19:07               Narrative:    EXAMINATION:  AP PORTABLE CHEST    CLINICAL HISTORY:  SOB;    TECHNIQUE:  AP portable chest radiograph was submitted.    COMPARISON:  06/20/2023    FINDINGS:  AP portable chest radiograph demonstrates enlargement the cardiac silhouette.  There is pulmonary vascular congestion.  Fluid is seen along the minor fissure.  There are probable small to moderate bilateral pleural effusions.  A linear plate of atelectasis or scarring is seen at the right lung base.                                       Medications   melatonin tablet 6 mg (has no administration in time range)   senna-docusate 8.6-50 mg per tablet 1 tablet (has no administration in time range)   acetaminophen tablet 650 mg (has no administration in time range)   naloxone 0.4 mg/mL injection 0.02 mg (has no administration in time range)   magnesium oxide tablet 800 mg (has no administration in time range)   magnesium oxide tablet 800 mg (has no administration in time range)   glucose chewable tablet 16 g (has no administration in time range)   glucose chewable tablet 24 g (has no administration in time range)   dextrose 50% injection 12.5 g (has no administration in time range)   dextrose 50% injection 25 g (has no administration in time range)   glucagon (human recombinant) injection 1 mg (has no administration in time range)   aspirin EC tablet 81 mg (has no administration in time range)   atorvastatin tablet 40 mg (40 mg Oral Given 7/18/23 2210)   carvediloL tablet 3.125 mg (3.125 mg Oral Given 7/18/23 2210)   isosorbide mononitrate 24 hr tablet 30 mg (has no administration in time range)   lisinopriL tablet 40 mg (40 mg Oral Given 7/18/23 2210)   NIFEdipine 24 hr tablet 60 mg (60 mg Oral Given 7/18/23 2210)   apixaban tablet 5 mg (has no administration in time range)   dextrose 10 % infusion ( Intravenous  New Bag 7/18/23 6327)   sodium chloride 0.9% flush 10 mL (has no administration in time range)   dextrose 50 % in water (D50W) injection 25 g (25 g Intravenous Given 7/18/23 1753)   dextrose 10 % infusion ( Intravenous New Bag 7/18/23 2011)   Patient has reportedly been having recurrent hypoglycemic episodes since 03/30.  His now 8:30 p.m..  Blood sugar again dropping.  The patient has had both p.o. and IV glucose.  I am assuming this is from long-acting insulin in the setting of renal failure and being NPO today for procedure.  Patient's oral intake is limited by being edentulous.  Patient also showing evidence of some volume overload.  He is due for dialysis tomorrow.  Will place in observation overnight on insulin infusion with frequent blood sugar checks and plan to dialyze tomorrow.                           Clinical Impression:   Final diagnoses:  [E16.2] Hypoglycemia  [R07.9] Chest pain        ED Disposition Condition    Observation                 Jhonny Marshall MD  07/18/23 [id number]       Jhonny Marshall MD  07/19/23 [id number]

## 2023-07-19 NOTE — PLAN OF CARE
07/19/23 0911   Discharge Planning   Assessment Type Discharge Planning Brief Assessment   Resource/Environmental Concerns none   Support Systems Other (Comment)   Assistance Needed [facility]   Equipment Currently Used at Home none   Current Living Arrangements independent living facility   Care Facility Name [facility]   Patient/Family Anticipated Services at Transition none   DME Needed Upon Discharge  none   Discharge Plan A Return to nursing home   Discharge Plan B Return to Nursing Home     TN talked with nurse Monique at [facility].  Patient has been there a year. Up in w/c daily.Kassandra Leon (Mother)   [phone] (Mobile)

## 2023-07-19 NOTE — CONSULTS
Date of Admit: 7/18/2023  Length of Stay: 0   Days  Consulting Staff:MD Abebe    Date of Consult: 7/19/2023    Reason for Consultation     HD    Subjective:      History of Present Illness:  Cosme Lewis is a 57 y.o. year old male with a past medical history significant for esrd at [facility] under Porsha.  PT had fistulogram with post low sugar. Pt was admitted for low sugar.    Past Medical History:  Past Medical History:   Diagnosis Date    CHF (congestive heart failure)     Chronic kidney disease-mineral and bone disorder 10/12/2022    Chronic respiratory failure     COPD (chronic obstructive pulmonary disease)     Diabetes mellitus type 1     ESRD on dialysis     Hypertension     Hypervolemia 02/22/2023    Hyponatremia 03/04/2023    Other insomnia     Recurrent major depression     SOB (shortness of breath) 10/12/2022    Patient with history COPD treated with Ellipta the albuterol, fluticasone-salmeterol tachypneic in the ED saturating 94% and 6 L on nasal cannula, patient does not know how many  he uses it is in nursing home.    -duo nebs Q 4  Given that patient is a poor historian, and in the setting of left lower extremity edema and history of coagulopathy PE cannot be ruled out.  Will workup for possible infec    Unspecified lack of coordination        Past Surgical History:  Past Surgical History:   Procedure Laterality Date    AV FISTULA PLACEMENT         Allergies:  Review of patient's allergies indicates:  No Known Allergies    Home Medications:    Current Facility-Administered Medications on File Prior to Encounter   Medication Dose Route Frequency Provider Last Rate Last Admin    [DISCONTINUED] ceFAZolin (ANCEF) 1 gram in dextrose 5 % 50 mL IVPB (premix)   Intravenous Continuous PRN Duong Aceves MD   2 g at 07/18/23 1246    [DISCONTINUED] dextrose 50 % in water (D50W) injection 25 g  25 g Intravenous Once Duong Aceves MD        [DISCONTINUED] fentaNYL 50 mcg/mL injection    Intravenous PRN Duong Aceves MD   25 mcg at 07/18/23 1317    [DISCONTINUED] midazolam (VERSED) 1 mg/mL injection    PRN Duong Aceves MD   0.5 mg at 07/18/23 1316     Current Outpatient Medications on File Prior to Encounter   Medication Sig Dispense Refill    acetaminophen (TYLENOL) 650 MG TbSR Take 650 mg by mouth every 8 (eight) hours as needed (pain or fever over 100.4).      albuterol (PROVENTIL/VENTOLIN HFA) 90 mcg/actuation inhaler Inhale 2 puffs into the lungs 4 (four) times daily as needed (breathing).      albuterol-ipratropium (DUO-NEB) 2.5 mg-0.5 mg/3 mL nebulizer solution Take 3 mLs by nebulization every 4 (four) hours while awake. Rescue 6480 mL 0    apixaban (ELIQUIS) 5 mg Tab Take 5 mg by mouth 2 (two) times daily.      artificial tears (ISOPTO TEARS) 0.5 % ophthalmic solution Place 1 drop into both eyes 6 (six) times daily. 15 mL 0    aspirin (ECOTRIN) 81 MG EC tablet Take 81 mg by mouth once daily.      atorvastatin (LIPITOR) 40 MG tablet Take 1 tablet (40 mg total) by mouth once daily. (Patient taking differently: Take 40 mg by mouth every evening.) 90 tablet 3    carvediloL (COREG) 6.25 MG tablet Take 3.125 mg by mouth 2 (two) times daily.      cetirizine (ZYRTEC) 10 MG tablet Take 10 mg by mouth daily as needed (itching).      cloNIDine 0.3 mg/24 hr td ptwk (CATAPRES) 0.3 mg/24 hr APPLY ONE PATCH TOPICALLY EVERY WEEK FOR HIGH BLOOD PRESSURE      epoetin xavier (PROCRIT) 10,000 unit/mL injection Inject 0.7 mLs (7,000 Units total) into the skin every Tues, Thurs, Sat.      FLUoxetine 40 MG capsule Take 40 mg by mouth once daily.      fluticasone-salmeterol diskus inhaler 250-50 mcg Inhale 1 puff into the lungs 2 (two) times daily.      GLUCAGON EMERGENCY KIT, HUMAN, 1 mg injection 1 mg into the muscle as needed if CBG < 70      HYDROPHILIC CREAM TOP Apply liberal amount to affected area twice daily for dry skin      isosorbide mononitrate (IMDUR) 30 MG 24 hr tablet Take 1 tablet (30  mg total) by mouth once daily. 90 tablet 3    lisinopriL (PRINIVIL,ZESTRIL) 40 MG tablet Take 1 tablet (40 mg total) by mouth every evening. 90 tablet 3    melatonin 3 mg TbDL Take 9 mg by mouth nightly as needed (sleep).      NIFEdipine (PROCARDIA-XL) 60 MG (OSM) 24 hr tablet Take 1 tablet (60 mg total) by mouth 2 (two) times a day. 60 tablet 11    nut.tx.imp.renal fxn,lac-reduc (NEPRO CARB STEADY ORAL) Give 1 carton by mouth with each meal.      ondansetron (ZOFRAN) 8 MG tablet Take 1 tablet by mouth 3 (three) times daily as needed for Nausea.      sevelamer carbonate (RENVELA) 800 mg Tab Take 800 mg by mouth 3 (three) times daily.      sodium chloride (SALINE NASAL) 0.65 % nasal spray 1 spray by Nasal route as needed (dry nostril). 30 mL 12    tiotropium bromide (SPIRIVA RESPIMAT) 2.5 mcg/actuation inhaler Inhale 2 puffs into the lungs Daily.      umeclidinium (INCRUSE ELLIPTA) 62.5 mcg/actuation inhalation capsule Inhale 62.5 mcg into the lungs once daily. Controller      vitamin renal formula, B-complex-vitamin c-folic acid, (NEPHROCAP) 1 mg Cap Take 1 capsule by mouth once daily.      white petrolatum (VASELINE) ointment Apply topically once daily. Apply small amount to both nostrils daily 5 g 0    [DISCONTINUED] insulin detemir U-100, Levemir, 100 unit/mL (3 mL) SubQ InPn pen Inject 8 Units into the skin once daily. (Patient taking differently: Inject 8 Units into the skin 2 (two) times a day.) 7.2 mL 3    [DISCONTINUED] insulin lispro (HUMALOG KWIKPEN INSULIN) 100 unit/mL pen Inject 8 Units into the skin 3 (three) times daily before meals. 6 mL 11       Family History:  Family History   Problem Relation Age of Onset    Diabetes Mother        Social History:  Social History     Tobacco Use    Smoking status: Never    Smokeless tobacco: Never   Substance Use Topics    Alcohol use: Not Currently       Review of Systems:       Objective:     Scheduled Meds:   apixaban  5 mg Oral BID    aspirin  81 mg Oral Daily     atorvastatin  40 mg Oral QHS    carvediloL  3.125 mg Oral BID    isosorbide mononitrate  30 mg Oral Daily    lisinopriL  40 mg Oral QHS    NIFEdipine  60 mg Oral BID     Continuous Infusions:  PRN Meds:.acetaminophen, dextrose 50%, dextrose 50%, glucagon (human recombinant), glucose, glucose, magnesium oxide, magnesium oxide, melatonin, naloxone, senna-docusate 8.6-50 mg, sodium chloride 0.9%      Physical Examination:    Vitals: BP (!) 149/72   Pulse 72   Temp 97.8 °F (36.6 °C)   Resp 20   Wt 76.5 kg (168 lb 10.4 oz)   SpO2 (!) 93%   BMI 24.91 kg/m²    No intake/output data recorded.  I/O this shift:  In: 480 [P.O.:480]  Out: 2500 [Other:2500]      General:   HEENT:  CVS:  PULM:  ABD:  EXT:  NEURO  Laboratory:  Recent Results (from the past 24 hour(s))   POCT glucose    Collection Time: 07/18/23  7:19 PM   Result Value Ref Range    POCT Glucose 64 (L) 70 - 110 mg/dL   POCT Glucose, Hand-Held Device    Collection Time: 07/18/23  8:05 PM   Result Value Ref Range    POC Glucose 71 70 - 110 MG/DL   POCT glucose    Collection Time: 07/18/23  8:05 PM   Result Value Ref Range    POCT Glucose 71 70 - 110 mg/dL   Blood culture #1    Collection Time: 07/18/23  8:42 PM    Specimen: Peripheral, Right Hand; Blood   Result Value Ref Range    Blood Culture, Routine No Growth to date    Blood culture #2    Collection Time: 07/18/23  8:42 PM    Specimen: Blood   Result Value Ref Range    Blood Culture, Routine No Growth to date    Magnesium    Collection Time: 07/18/23  8:42 PM   Result Value Ref Range    Magnesium 2.2 1.6 - 2.6 mg/dL   Phosphorus    Collection Time: 07/18/23  8:42 PM   Result Value Ref Range    Phosphorus 3.9 2.7 - 4.5 mg/dL   Lactic acid, plasma    Collection Time: 07/18/23  8:42 PM   Result Value Ref Range    Lactate (Lactic Acid) 1.0 0.5 - 2.2 mmol/L   Comprehensive metabolic panel    Collection Time: 07/18/23  8:42 PM   Result Value Ref Range    Sodium 136 136 - 145 mmol/L    Potassium 3.8 3.5 - 5.1  mmol/L    Chloride 97 95 - 110 mmol/L    CO2 29 23 - 29 mmol/L    Glucose 71 70 - 110 mg/dL    BUN 29 (H) 6 - 20 mg/dL    Creatinine 4.4 (H) 0.5 - 1.4 mg/dL    Calcium 8.2 (L) 8.7 - 10.5 mg/dL    Total Protein 6.8 6.0 - 8.4 g/dL    Albumin 3.2 (L) 3.5 - 5.2 g/dL    Total Bilirubin 0.7 0.1 - 1.0 mg/dL    Alkaline Phosphatase 184 (H) 55 - 135 U/L    AST 22 10 - 40 U/L    ALT 16 10 - 44 U/L    eGFR 15 (A) >60 mL/min/1.73 m^2    Anion Gap 10 8 - 16 mmol/L   Hemoglobin A1c    Collection Time: 07/18/23  8:42 PM   Result Value Ref Range    Hemoglobin A1C 10.7 (H) 4.0 - 5.6 %    Estimated Avg Glucose 260 (H) 68 - 131 mg/dL   POCT glucose    Collection Time: 07/18/23  9:37 PM   Result Value Ref Range    POCT Glucose 93 70 - 110 mg/dL   POCT glucose    Collection Time: 07/18/23 10:45 PM   Result Value Ref Range    POCT Glucose 158 (H) 70 - 110 mg/dL   POCT glucose    Collection Time: 07/19/23  2:49 AM   Result Value Ref Range    POCT Glucose 75 70 - 110 mg/dL   CBC Auto Differential    Collection Time: 07/19/23  5:44 AM   Result Value Ref Range    WBC 1.99 (LL) 3.90 - 12.70 K/uL    RBC 3.72 (L) 4.60 - 6.20 M/uL    Hemoglobin 10.7 (L) 14.0 - 18.0 g/dL    Hematocrit 33.9 (L) 40.0 - 54.0 %    MCV 91 82 - 98 fL    MCH 28.8 27.0 - 31.0 pg    MCHC 31.6 (L) 32.0 - 36.0 g/dL    RDW 14.5 11.5 - 14.5 %    Platelets 175 150 - 450 K/uL    MPV 10.7 9.2 - 12.9 fL    Immature Granulocytes CANCELED 0.0 - 0.5 %    Immature Grans (Abs) CANCELED 0.00 - 0.04 K/uL    Lymph # CANCELED 1.0 - 4.8 K/uL    Mono # CANCELED 0.3 - 1.0 K/uL    Eos # CANCELED 0.0 - 0.5 K/uL    Baso # CANCELED 0.00 - 0.20 K/uL    nRBC 0 0 /100 WBC    Gran % 55.0 38.0 - 73.0 %    Lymph % 25.0 18.0 - 48.0 %    Mono % 15.0 4.0 - 15.0 %    Eosinophil % 0.0 0.0 - 8.0 %    Basophil % 0.0 0.0 - 1.9 %    Bands 5.0 %    Differential Method Manual    Comprehensive Metabolic Panel    Collection Time: 07/19/23  5:44 AM   Result Value Ref Range    Sodium 134 (L) 136 - 145 mmol/L     Potassium 4.1 3.5 - 5.1 mmol/L    Chloride 96 95 - 110 mmol/L    CO2 28 23 - 29 mmol/L    Glucose 82 70 - 110 mg/dL    BUN 30 (H) 6 - 20 mg/dL    Creatinine 4.6 (H) 0.5 - 1.4 mg/dL    Calcium 7.8 (L) 8.7 - 10.5 mg/dL    Total Protein 5.9 (L) 6.0 - 8.4 g/dL    Albumin 2.8 (L) 3.5 - 5.2 g/dL    Total Bilirubin 0.6 0.1 - 1.0 mg/dL    Alkaline Phosphatase 151 (H) 55 - 135 U/L    AST 18 10 - 40 U/L    ALT 13 10 - 44 U/L    eGFR 14 (A) >60 mL/min/1.73 m^2    Anion Gap 10 8 - 16 mmol/L   Magnesium    Collection Time: 07/19/23  5:44 AM   Result Value Ref Range    Magnesium 2.1 1.6 - 2.6 mg/dL   POCT glucose    Collection Time: 07/19/23  7:28 AM   Result Value Ref Range    POCT Glucose 96 70 - 110 mg/dL   POCT glucose    Collection Time: 07/19/23 11:12 AM   Result Value Ref Range    POCT Glucose 203 (H) 70 - 110 mg/dL   CBC Auto Differential    Collection Time: 07/19/23 12:08 PM   Result Value Ref Range    WBC 2.54 (L) 3.90 - 12.70 K/uL    RBC 3.47 (L) 4.60 - 6.20 M/uL    Hemoglobin 10.1 (L) 14.0 - 18.0 g/dL    Hematocrit 33.9 (L) 40.0 - 54.0 %    MCV 98 82 - 98 fL    MCH 29.1 27.0 - 31.0 pg    MCHC 29.8 (L) 32.0 - 36.0 g/dL    RDW 14.9 (H) 11.5 - 14.5 %    Platelets 141 (L) 150 - 450 K/uL    MPV 11.2 9.2 - 12.9 fL    Immature Granulocytes 0.8 (H) 0.0 - 0.5 %    Gran # (ANC) 1.7 (L) 1.8 - 7.7 K/uL    Immature Grans (Abs) 0.02 0.00 - 0.04 K/uL    Lymph # 0.5 (L) 1.0 - 4.8 K/uL    Mono # 0.3 0.3 - 1.0 K/uL    Eos # 0.0 0.0 - 0.5 K/uL    Baso # 0.02 0.00 - 0.20 K/uL    nRBC 0 0 /100 WBC    Gran % 65.7 38.0 - 73.0 %    Lymph % 18.1 18.0 - 48.0 %    Mono % 13.4 4.0 - 15.0 %    Eosinophil % 1.2 0.0 - 8.0 %    Basophil % 0.8 0.0 - 1.9 %    Differential Method Automated                Diagnostic Tests:   Cxr- edema       Assessment/Plan:     Cosme Lewis is a 57 y.o. male admitted with:  1.esrd. cont hd MWF.  2.htn. uf with hd.  3.dm2. Following sugars. Better.  4.anemia 2nd to esrd. Hg ok.  5.malfxn avf. Avf working.           Jailyn COLLINS  Arelis

## 2023-07-19 NOTE — HPI
Cosme Lewis 57 y.o. male with CHF, ESRD on HD MWF, history of PE on eliquis, HTN, DM presents to the hospital with a chief complaint of hypoglycemia.  He originally arrived in the hospital for outpatient fistulogram due to concern for flow-limiting stenosis.  He underwent fistulogram and no flow-limiting stenosis was seen.  After the procedure he was found to be hypoglycemic and brought to the emergency room.  He denies complaints, he reports he has been eating since arrival in the emergency room.  He is unable to state what he takes medications for the nursing home.  He states he was on dialysis and uses oxygen daily.  He denies fever chest pain shortness of breath nausea vomiting abdominal pain melena hematuria hematemesis.    Discussed with his nurse at [facility] nursing [facility] she reports he has been in his normal state of health prior to fistulogram today.  She is unsure when he got his last dose of insulin, but she assumes it was this morning at his regularly scheduled time.  He was to be NPO for the procedure.  She reports he is compliant with his home Eliquis oxygen and dialysis.  He last underwent dialysis yesterday.  At baseline he is oriented to person and place per the nurse.  She reports he is a full code.    In the ED, afebrile without leukocytosis recurrently hypoglycemic to as low as 37 chest x-ray with findings concerning for CHF CT head without acute intracranial process potassium within normal limits hemoglobin 10.8 troponin negative lactic acid negative.

## 2023-07-19 NOTE — CARE UPDATE
Nursing reported WBCs at 1.99, according to medical records, WBCs are chronically low, critical value  likely due to IVFs.  Ordered repeat, neutropenic precautions, continue to monitor.

## 2023-07-19 NOTE — PLAN OF CARE
Problem: Adult Inpatient Plan of Care  Goal: Plan of Care Review  Outcome: Ongoing, Progressing  Goal: Patient-Specific Goal (Individualized)  Outcome: Ongoing, Progressing  Goal: Absence of Hospital-Acquired Illness or Injury  Outcome: Ongoing, Progressing  Intervention: Identify and Manage Fall Risk  Flowsheets (Taken 7/19/2023 0415)  Safety Promotion/Fall Prevention:   assistive device/personal item within reach   side rails raised x 2  Intervention: Prevent Skin Injury  Flowsheets (Taken 7/19/2023 0415)  Body Position: side-lying  Skin Protection: incontinence pads utilized  Intervention: Prevent and Manage VTE (Venous Thromboembolism) Risk  Flowsheets (Taken 7/19/2023 0415)  Activity Management: Arm raise - L1  Range of Motion: active ROM (range of motion) encouraged  Intervention: Prevent Infection  Flowsheets (Taken 7/19/2023 0415)  Infection Prevention:   single patient room provided   rest/sleep promoted  Goal: Optimal Comfort and Wellbeing  Outcome: Ongoing, Progressing  Intervention: Monitor Pain and Promote Comfort  Flowsheets (Taken 7/19/2023 0415)  Pain Management Interventions:   relaxation techniques promoted   quiet environment facilitated  Intervention: Provide Person-Centered Care  Flowsheets (Taken 7/19/2023 0415)  Trust Relationship/Rapport:   choices provided   care explained   reassurance provided  Goal: Readiness for Transition of Care  Outcome: Ongoing, Progressing     Problem: Diabetes Comorbidity  Goal: Blood Glucose Level Within Targeted Range  Outcome: Ongoing, Progressing  Intervention: Monitor and Manage Glycemia  Flowsheets (Taken 7/19/2023 0415)  Glycemic Management: blood glucose monitored     Problem: Impaired Wound Healing  Goal: Optimal Wound Healing  Outcome: Ongoing, Progressing  Intervention: Promote Wound Healing  Flowsheets (Taken 7/19/2023 0415)  Activity Management: Arm raise - L1  Pain Management Interventions:   relaxation techniques promoted   quiet environment  facilitated     Problem: Skin Injury Risk Increased  Goal: Skin Health and Integrity  Outcome: Ongoing, Progressing  Intervention: Optimize Skin Protection  Flowsheets (Taken 7/19/2023 8543)  Pressure Reduction Techniques: frequent weight shift encouraged  Skin Protection: incontinence pads utilized  Head of Bed (HOB) Positioning: HOB elevated

## 2023-07-19 NOTE — HOSPITAL COURSE
Cosme Lewis 57 y.o. male with CHF, ESRD on HD MWF, history of PE on eliquis, HTN, DM presents to the hospital with a chief complaint of hypoglycemia.  He originally arrived in the hospital for outpatient fistulogram due to concern for flow-limiting stenosis.  He underwent fistulogram and no flow-limiting stenosis was seen.  After the procedure he was found to be hypoglycemic and brought to the emergency room.  He denies complaints, he reports he has been eating since arrival in the emergency room.  He is unable to state what he takes medications for the nursing home.  He states he was on dialysis and uses oxygen daily.  He denies fever chest pain shortness of breath nausea vomiting abdominal pain melena hematuria hematemesis.     Was discussed with his nurse at [facility] nursing [facility] she reports he has been in his normal state of health prior to fistulogram today.  She is unsure when he got his last dose of insulin, but she assumes it was this morning at his regularly scheduled time.  He was to be NPO for the procedure.  She reports he is compliant with his home Eliquis oxygen and dialysis.  He last underwent dialysis yesterday.  At baseline he is oriented to person and place per the nurse.  She reports he is a full code.     In the ED, afebrile without leukocytosis recurrently hypoglycemic to as low as 37 chest x-ray with findings concerning for CHF CT head without acute intracranial process potassium within normal limits hemoglobin 10.8 troponin negative lactic acid negative.patient was started on IVF with D 10,his hypoglycemia is resolved,patient was eating well,D 10 IVF discontinued and his blood sugar remains stable.he had his routine HD by nephrology,patient was discharged back to NH with only SSI and follow up with PCP.

## 2023-07-19 NOTE — PLAN OF CARE
[facility]     Department of Hospital Medicine     [address] [place] [address]     [phone] after hours  [phone] fax       NURSING HOME ORDERS    07/19/2023    Admit to Nursing Home:  Regular Bed                                              Diagnoses:  Active Hospital Problems    Diagnosis  POA    *Hypoglycemia [E16.2]  Unknown    History of pulmonary embolism [Z86.711]  Unknown    Type 2 diabetes mellitus treated with insulin [E11.9, Z79.4]  Not Applicable    End-stage renal disease on hemodialysis [N18.6, Z99.2]  Not Applicable    Acute on chronic diastolic heart failure [I50.33]  Yes    Hypertension [I10]  Yes    Anemia in ESRD (end-stage renal disease) [N18.6, D63.1]  Yes     Chronic    HLD (hyperlipidemia) [E78.5]  Yes     Chronic    Chronic hypoxemic respiratory failure [J96.11]  Yes     Chronic    QT prolongation [R94.31]  Yes      Resolved Hospital Problems    Diagnosis Date Resolved POA    Type 2 diabetes mellitus with hyperglycemia, with long-term current use of insulin [E11.65, Z79.4] 07/18/2023 Not Applicable     Chronic       Patient is homebound due to:  Hypoglycemia    Allergies:Review of patient's allergies indicates:  No Known Allergies    Vitals:       Every shift (Skilled Nursing patients)    Diet: soft diet     Acitivities:      - Up in a chair each morning as tolerated       - May use walker, cane, or self-propelled wheelchair   - Weight bearing:as tolerated       Nursing Precautions:     - Aspiration precautions:             - Total assistance with meals            -  Upright 90 degrees befor during and after meals             -  Suction at bedside          - Fall precautions per nursing home protocol     - Decubitus precautions:        -  for positioning   - Pressure reducing foam mattress   - Turn patient every two hours. Use wedge pillows to anchor patient      MISCELLANEOUS CARE:      Meplex to buttock every 2 days       Routine Skin for Bedridden Patients:  Apply moisture barrier cream to all    skin folds and wet areas in perineal area daily and after baths and                           all bowel movements.                           DIABETES CARE:       Check blood sugar:         Fingerstick blood sugar AC and HS   Fingerstick blood sugar every 6 hours if unable to eat      Report CBG < 60 or > 400 to physician.                                          Insulin Sliding Scale          Glucose  Novolog Insulin Subcutaneous        0 - 60   Orange juice or glucose tablet, hold insulin      No insulin   201-250  2 units   251-300  4 units   301-350  6 units   351-400  8 units   >400   10 units then call physician      Medications: Discontinue all previous medication orders, if any. See new list below.       Medication List        CHANGE how you take these medications      atorvastatin 40 MG tablet  Commonly known as: LIPITOR  Take 1 tablet (40 mg total) by mouth once daily.  What changed: when to take this            CONTINUE taking these medications      acetaminophen 650 MG Tbsr  Commonly known as: TYLENOL  Take 650 mg by mouth every 8 (eight) hours as needed (pain or fever over 100.4).     albuterol 90 mcg/actuation inhaler  Commonly known as: PROVENTIL/VENTOLIN HFA  Inhale 2 puffs into the lungs 4 (four) times daily as needed (breathing).     albuterol-ipratropium 2.5 mg-0.5 mg/3 mL nebulizer solution  Commonly known as: DUO-NEB  Take 3 mLs by nebulization every 4 (four) hours while awake. Rescue     apixaban 5 mg Tab  Commonly known as: ELIQUIS  Take 5 mg by mouth 2 (two) times daily.     artificial tears 0.5 % ophthalmic solution  Commonly known as: ISOPTO TEARS  Place 1 drop into both eyes 6 (six) times daily.     aspirin 81 MG EC tablet  Commonly known as: ECOTRIN  Take 81 mg by mouth once daily.     carvediloL 6.25 MG tablet  Commonly known as: COREG  Take 3.125 mg by mouth 2 (two) times daily.     cetirizine 10 MG  tablet  Commonly known as: ZYRTEC  Take 10 mg by mouth daily as needed (itching).     cloNIDine 0.3 mg/24 hr td ptwk 0.3 mg/24 hr  Commonly known as: CATAPRES  APPLY ONE PATCH TOPICALLY EVERY WEEK FOR HIGH BLOOD PRESSURE     epoetin xavier 10,000 unit/mL injection  Commonly known as: PROCRIT  Inject 0.7 mLs (7,000 Units total) into the skin every Tues, Thurs, Sat.     FLUoxetine 40 MG capsule  Take 40 mg by mouth once daily.     fluticasone-salmeterol 250-50 mcg/dose 250-50 mcg/dose diskus inhaler  Commonly known as: ADVAIR  Inhale 1 puff into the lungs 2 (two) times daily.     GLUCAGON EMERGENCY KIT (HUMAN) 1 mg Solr  Generic drug: glucagon  1 mg into the muscle as needed if CBG < 70     HYDROPHILIC CREAM TOP  Apply liberal amount to affected area twice daily for dry skin     INCRUSE ELLIPTA 62.5 mcg/actuation inhalation capsule  Generic drug: umeclidinium  Inhale 62.5 mcg into the lungs once daily. Controller     isosorbide mononitrate 30 MG 24 hr tablet  Commonly known as: IMDUR  Take 1 tablet (30 mg total) by mouth once daily.     lisinopriL 40 MG tablet  Commonly known as: PRINIVIL,ZESTRIL  Take 1 tablet (40 mg total) by mouth every evening.     melatonin 3 mg Tbdl  Take 9 mg by mouth nightly as needed (sleep).     NEPRO CARB STEADY ORAL  Give 1 carton by mouth with each meal.     NIFEdipine 60 MG (OSM) 24 hr tablet  Commonly known as: PROCARDIA-XL  Take 1 tablet (60 mg total) by mouth 2 (two) times a day.     ondansetron 8 MG tablet  Commonly known as: ZOFRAN  Take 1 tablet by mouth 3 (three) times daily as needed for Nausea.     sevelamer carbonate 800 mg Tab  Commonly known as: RENVELA  Take 800 mg by mouth 3 (three) times daily.     sodium chloride 0.65 % nasal spray  Commonly known as: Saline NasaL  1 spray by Nasal route as needed (dry nostril).     tiotropium bromide 2.5 mcg/actuation inhaler  Commonly known as: SPIRIVA RESPIMAT  Inhale 2 puffs into the lungs Daily.     vitamin renal formula  (B-complex-vitamin c-folic acid) 1 mg Cap  Commonly known as: NEPHROCAP  Take 1 capsule by mouth once daily.     white petrolatum ointment  Commonly known as: VASELINE  Apply topically once daily. Apply small amount to both nostrils daily            STOP taking these medications      insulin detemir U-100 (Levemir) 100 unit/mL (3 mL) Inpn pen     insulin lispro 100 unit/mL pen  Commonly known as: HumaLOG KwikPen Insulin                    _________________________________  Arben Lomas MD  07/19/2023

## 2023-07-19 NOTE — PLAN OF CARE
TN sent secure chat to  med surg nurse Rebecca, please call report to [phone] ask for the nurse who has room 209B.  ADT 30 order placed for Van Transportation.  Requested  time:  If transportation does not arrive at ETA time nurse will be instructed to follow protocol for transportation below:6:30 PM w/c van [place] to [address] [id number]  How can I get in touch directly with dispatch, if needed?                 Non-emergent dispatch: [phone] option 6    +++NURSING:  If Van does not arrive at requested time please call the above Non Emergent Dispatcher.  If issue not resolved please escalate to your charge nurse for further instructions.   07/19/23 1443   Final Note   Assessment Type Final Discharge Note   Anticipated Discharge Disposition Int Care Fac   What phone number can be called within the next 1-3 days to see how you are doing after discharge?   (see chart)   Hospital Resources/Appts/Education Provided Provided patient/caregiver with written discharge plan information;Appointments scheduled and added to AVS;Appointments scheduled by Navigator/Coordinator   Post-Acute Status   Post-Acute Authorization Placement   Post-Acute Placement Status Set-up Complete/Auth obtained   Discharge Delays None known at this time          5

## 2023-07-19 NOTE — ASSESSMENT & PLAN NOTE
Patient with Hypoxic Respiratory failure which is Chronic.  he is on home oxygen at 2\ LPM. Supplemental oxygen was provided and noted-      .   Contributing diagnoses includes - CHF Labs and images were reviewed. Patient Has not had a recent ABG. Will treat underlying causes and adjust management of respiratory failure as follows- continue home oxygen, HD for volume removal

## 2023-07-19 NOTE — H&P
[facility] Medicine  History & Physical    Patient Name: Cosme Lewis  MRN: [id number]  Patient Class: OP- Observation  Admission Date: 7/18/2023  Attending Physician: Cedrick Sales MD   Primary Care Provider: Primary Doctor No         Patient information was obtained from patient, past medical records and ER records.     Subjective:     Principal Problem:Hypoglycemia    Chief Complaint:   Chief Complaint   Patient presents with    Hypoglycemia     Pt from [facility] due to hypoglycemia. Per [facility] nurse, pt's CBG was 36 post procedure, given 12.5 g of D50 at 1430, cbg increased then decreased again 30 min later. Pt received another 12.5g of D50, CBG was 136 but continued to drop even after pt drank juice. Pt is lethargic and awakens to painful stimuli        HPI: Cosme Lewis 57 y.o. male with CHF, ESRD on HD MWF, history of PE on eliquis, HTN, DM presents to the hospital with a chief complaint of hypoglycemia.  He originally arrived in the hospital for outpatient fistulogram due to concern for flow-limiting stenosis.  He underwent fistulogram and no flow-limiting stenosis was seen.  After the procedure he was found to be hypoglycemic and brought to the emergency room.  He denies complaints, he reports he has been eating since arrival in the emergency room.  He is unable to state what he takes medications for the nursing home.  He states he was on dialysis and uses oxygen daily.  He denies fever chest pain shortness of breath nausea vomiting abdominal pain melena hematuria hematemesis.    Discussed with his nurse at [facility] nursing [facility] she reports he has been in his normal state of health prior to fistulogram today.  She is unsure when he got his last dose of insulin, but she assumes it was this morning at his regularly scheduled time.  He was to be NPO for the procedure.  She reports he is compliant with his home Eliquis oxygen and dialysis.  He last underwent dialysis yesterday.  At baseline he is  oriented to person and place per the nurse.  She reports he is a full code.    In the ED, afebrile without leukocytosis recurrently hypoglycemic to as low as 37 chest x-ray with findings concerning for CHF CT head without acute intracranial process potassium within normal limits hemoglobin 10.8 troponin negative lactic acid negative.      Past Medical History:   Diagnosis Date    CHF (congestive heart failure)     Chronic kidney disease-mineral and bone disorder 10/12/2022    Chronic respiratory failure     COPD (chronic obstructive pulmonary disease)     Diabetes mellitus type 1     ESRD on dialysis     Hypertension     Hypervolemia 02/22/2023    Hyponatremia 03/04/2023    Other insomnia     Recurrent major depression     SOB (shortness of breath) 10/12/2022    Patient with history COPD treated with Ellipta the albuterol, fluticasone-salmeterol tachypneic in the ED saturating 94% and 6 L on nasal cannula, patient does not know how many  he uses it is in nursing home.    -duo nebs Q 4  Given that patient is a poor historian, and in the setting of left lower extremity edema and history of coagulopathy PE cannot be ruled out.  Will workup for possible infec    Unspecified lack of coordination        Past Surgical History:   Procedure Laterality Date    AV FISTULA PLACEMENT         Review of patient's allergies indicates:  No Known Allergies    Current Facility-Administered Medications on File Prior to Encounter   Medication    ceFAZolin (ANCEF) 1 gram in dextrose 5 % 50 mL IVPB (premix)    [COMPLETED] dextrose 50 % in water (D50W) injection 12.5 g    [COMPLETED] dextrose 50 % in water (D50W) injection 12.5 g    dextrose 50 % in water (D50W) injection 25 g    fentaNYL 50 mcg/mL injection    [COMPLETED] hydrALAZINE injection 10 mg    [COMPLETED] hydrALAZINE injection 10 mg    [COMPLETED] iohexoL (OMNIPAQUE 350) injection 30 mL    midazolam (VERSED) 1 mg/mL injection    [DISCONTINUED] dextrose 50  % in water (D50W) injection 25 g     Current Outpatient Medications on File Prior to Encounter   Medication Sig    acetaminophen (TYLENOL) 650 MG TbSR Take 650 mg by mouth every 8 (eight) hours as needed (pain or fever over 100.4).    albuterol (PROVENTIL/VENTOLIN HFA) 90 mcg/actuation inhaler Inhale 2 puffs into the lungs 4 (four) times daily as needed (breathing).    albuterol-ipratropium (DUO-NEB) 2.5 mg-0.5 mg/3 mL nebulizer solution Take 3 mLs by nebulization every 4 (four) hours while awake. Rescue    apixaban (ELIQUIS) 5 mg Tab Take 5 mg by mouth 2 (two) times daily.    artificial tears (ISOPTO TEARS) 0.5 % ophthalmic solution Place 1 drop into both eyes 6 (six) times daily.    aspirin (ECOTRIN) 81 MG EC tablet Take 81 mg by mouth once daily.    atorvastatin (LIPITOR) 40 MG tablet Take 1 tablet (40 mg total) by mouth once daily. (Patient taking differently: Take 40 mg by mouth every evening.)    carvediloL (COREG) 6.25 MG tablet Take 3.125 mg by mouth 2 (two) times daily.    cetirizine (ZYRTEC) 10 MG tablet Take 10 mg by mouth daily as needed (itching).    cloNIDine 0.3 mg/24 hr td ptwk (CATAPRES) 0.3 mg/24 hr APPLY ONE PATCH TOPICALLY EVERY WEEK FOR HIGH BLOOD PRESSURE    epoetin xavier (PROCRIT) 10,000 unit/mL injection Inject 0.7 mLs (7,000 Units total) into the skin every Tues, Thurs, Sat.    FLUoxetine 40 MG capsule Take 40 mg by mouth once daily.    fluticasone-salmeterol diskus inhaler 250-50 mcg Inhale 1 puff into the lungs 2 (two) times daily.    GLUCAGON EMERGENCY KIT, HUMAN, 1 mg injection 1 mg into the muscle as needed if CBG < 70    HYDROPHILIC CREAM TOP Apply liberal amount to affected area twice daily for dry skin    insulin detemir U-100, Levemir, 100 unit/mL (3 mL) SubQ InPn pen Inject 8 Units into the skin once daily. (Patient taking differently: Inject 8 Units into the skin 2 (two) times a day.)    insulin lispro (HUMALOG KWIKPEN INSULIN) 100 unit/mL pen Inject 8 Units into  the skin 3 (three) times daily before meals.    isosorbide mononitrate (IMDUR) 30 MG 24 hr tablet Take 1 tablet (30 mg total) by mouth once daily.    lisinopriL (PRINIVIL,ZESTRIL) 40 MG tablet Take 1 tablet (40 mg total) by mouth every evening.    melatonin 3 mg TbDL Take 9 mg by mouth nightly as needed (sleep).    NIFEdipine (PROCARDIA-XL) 60 MG (OSM) 24 hr tablet Take 1 tablet (60 mg total) by mouth 2 (two) times a day.    nut.tx.imp.renal fxn,lac-reduc (NEPRO CARB STEADY ORAL) Give 1 carton by mouth with each meal.    ondansetron (ZOFRAN) 8 MG tablet Take 1 tablet by mouth 3 (three) times daily as needed for Nausea.    sevelamer carbonate (RENVELA) 800 mg Tab Take 800 mg by mouth 3 (three) times daily.    sodium chloride (SALINE NASAL) 0.65 % nasal spray 1 spray by Nasal route as needed (dry nostril).    tiotropium bromide (SPIRIVA RESPIMAT) 2.5 mcg/actuation inhaler Inhale 2 puffs into the lungs Daily.    umeclidinium (INCRUSE ELLIPTA) 62.5 mcg/actuation inhalation capsule Inhale 62.5 mcg into the lungs once daily. Controller    vitamin renal formula, B-complex-vitamin c-folic acid, (NEPHROCAP) 1 mg Cap Take 1 capsule by mouth once daily.    white petrolatum (VASELINE) ointment Apply topically once daily. Apply small amount to both nostrils daily     Family History       Problem Relation (Age of Onset)    Diabetes Mother          Tobacco Use    Smoking status: Never    Smokeless tobacco: Never   Substance and Sexual Activity    Alcohol use: Not Currently    Drug use: Not on file    Sexual activity: Not Currently     Review of Systems   Constitutional:  Negative for chills and fever.   HENT:  Negative for nosebleeds and tinnitus.    Eyes:  Negative for photophobia and visual disturbance.   Respiratory:  Negative for shortness of breath and wheezing.    Cardiovascular:  Negative for chest pain, palpitations and leg swelling.   Gastrointestinal:  Negative for abdominal distention, nausea and  vomiting.   Genitourinary:  Negative for dysuria, flank pain and hematuria.   Musculoskeletal:  Negative for gait problem and joint swelling.   Skin:  Negative for rash and wound.   Neurological:  Negative for seizures and syncope.   Objective:     Vital Signs (Most Recent):  Temp: 98 °F (36.7 °C) (07/18/23 2140)  Pulse: 65 (07/18/23 2140)  Resp: 18 (07/18/23 2140)  BP: (!) 186/97 (provider aware; awaiting med orders) (07/18/23 2140)  SpO2: 98 % (07/18/23 2140) Vital Signs (24h Range):  Temp:  [97.4 °F (36.3 °C)-98 °F (36.7 °C)] 98 °F (36.7 °C)  Pulse:  [58-65] 65  Resp:  [12-29] 18  SpO2:  [91 %-100 %] 98 %  BP: (139-206)/() 186/97     Weight: 68 kg (150 lb)  Body mass index is 22.15 kg/m².     Physical Exam  Vitals and nursing note reviewed.   Constitutional:       General: He is not in acute distress.     Appearance: He is well-developed. He is not diaphoretic.   HENT:      Head: Normocephalic and atraumatic.      Right Ear: External ear normal.      Left Ear: External ear normal.   Eyes:      General:         Right eye: No discharge.         Left eye: No discharge.      Conjunctiva/sclera: Conjunctivae normal.   Neck:      Thyroid: No thyromegaly.   Cardiovascular:      Rate and Rhythm: Normal rate and regular rhythm.      Heart sounds: No murmur heard.  Pulmonary:      Effort: Pulmonary effort is normal. No respiratory distress.      Breath sounds: Normal breath sounds.      Comments: On supplemental oxygen speaking in full sentences  Chest:      Chest wall: No tenderness.   Abdominal:      General: Bowel sounds are normal. There is no distension.      Palpations: Abdomen is soft. There is no mass.      Tenderness: There is no abdominal tenderness.   Musculoskeletal:         General: No deformity.      Cervical back: Normal range of motion.      Right lower leg: No edema.      Left lower leg: No edema.      Comments: Fistula left arm with palpable thrill   Skin:     General: Skin is warm and dry.    Neurological:      Mental Status: He is alert.      Comments: Oriented to person and place. Moves all extremities against gravity   Psychiatric:         Mood and Affect: Mood normal.         Behavior: Behavior normal.              Significant Labs: CBC:   Recent Labs   Lab 07/18/23 1835   WBC 2.05*   HGB 10.8*   HCT 34.2*        CMP:   Recent Labs   Lab 07/18/23  1442 07/18/23  2042   * 136   K 3.6 3.8   CL 98 97   CO2 28 29   * 71   BUN 28* 29*   CREATININE 4.2* 4.4*   CALCIUM 7.7* 8.2*   PROT  --  6.8   ALBUMIN  --  3.2*   BILITOT  --  0.7   ALKPHOS  --  184*   AST  --  22   ALT  --  16   ANIONGAP 8 10     Cardiac Markers:   Recent Labs   Lab 07/18/23 1835   BNP 3,393*     Coagulation:   Recent Labs   Lab 07/18/23 1835   INR 1.5*     Lactic Acid:   Recent Labs   Lab 07/18/23 2042   LACTATE 1.0     Troponin:   Recent Labs   Lab 07/18/23 1835   TROPONINI 0.025       Significant Imaging:   Imaging Results               CT Head Without Contrast (Final result)  Result time 07/18/23 19:02:10      Final result by Jj Kahn MD (07/18/23 19:02:10)                   Impression:      1. No acute intracranial process.  2. Involutional changes with chronic microvascular ischemic changes and small remote lacunar infarct of the right frontal periventricular white matter.  3. Paranasal sinus disease with air-fluid levels could represent acute sinusitis.  Recommend clinical correlation and follow-up.  4. Mild bilateral mastoid air cell disease also.  5.  This report was flagged in Epic as abnormal.      Electronically signed by: Jj Kahn  Date:    07/18/2023  Time:    19:02               Narrative:    EXAMINATION:  CT HEAD WITHOUT CONTRAST    CLINICAL HISTORY:  Mental status change, unknown cause;    TECHNIQUE:  Low dose axial CT images obtained throughout the head without intravenous contrast. Sagittal and coronal reconstructions were  performed.    COMPARISON:  06/20/2023    FINDINGS:  Intracranial compartment:    Ventricles and sulci are normal in size for age without evidence of hydrocephalus. No extra-axial blood or fluid collections.    Moderate involutional changes and chronic microvascular ischemic changes in the periventricular white matter.  Small remote lacunar infarct in the right frontal periventricular white matter.    No parenchymal mass, hemorrhage, edema or major vascular distribution infarct.    Skull/extracranial contents (limited evaluation): No fracture. Moderate-severe bilateral maxillary and ethmoid sinus disease with air-fluid levels could represent acute sinusitis.  Mild left sphenoid sinus disease also.    Mild bilateral mastoid air cell disease also.                                       X-Ray Chest AP Portable (Final result)  Result time 07/18/23 19:07:26      Final result by Opal Zuñiga MD (07/18/23 19:07:26)                   Impression:      Overall findings concerning for CHF.  A pneumonic process cannot be entirely excluded.  Recommend clinical correlation.      Electronically signed by: Opal Zuñiga  Date:    07/18/2023  Time:    19:07               Narrative:    EXAMINATION:  AP PORTABLE CHEST    CLINICAL HISTORY:  SOB;    TECHNIQUE:  AP portable chest radiograph was submitted.    COMPARISON:  06/20/2023    FINDINGS:  AP portable chest radiograph demonstrates enlargement the cardiac silhouette.  There is pulmonary vascular congestion.  Fluid is seen along the minor fissure.  There are probable small to moderate bilateral pleural effusions.  A linear plate of atelectasis or scarring is seen at the right lung base.                                          Assessment/Plan:     * Hypoglycemia  Presents with recurrent hypoglycemia to as low at 37 after being sent from [facility] after fistulogram. Discussed with night nurse at NH she is unsure of when patient got last dose of insulin but believes was this AM. At  baseline oriented to person/place.   Continue D10 infusion at 25cc/hr  POCT glucose Q4  Repeat A1c     Type 2 diabetes mellitus treated with insulin  Patient's FSGs are uncontrolled due to hypoglycemia on current medication regimen.  Last A1c reviewed-   Lab Results   Component Value Date    HGBA1C 10.7 (H) 06/21/2023     Most recent fingerstick glucose reviewed-   Recent Labs   Lab 07/18/23  1733 07/18/23  1758 07/18/23  1919 07/18/23 2005   POCTGLUCOSE 37* 146* 64* 71     Current correctional scale  not currently on insulin as hypoglycemic  Increase anti-hyperglycemic dose as follows-   Antihyperglycemics (From admission, onward)    None        Hold Oral hypoglycemics while patient is in the hospital.    History of pulmonary embolism  Continue home eliquis, confirmed with NH is currently taking.     End-stage renal disease on hemodialysis  Nephrology consulted for HD, underwent fistulogram today without requiring intervention    Acute on chronic diastolic heart failure  Echo 3/2023 with EF of 55% and grade 2 DD. Chest x-ray with pulmonary edema  Continue home coreg/lisinopril  Volume removal with HD  Daily weights/strict intake and output/telemetry    Hypertension  Continue home nifedipine, lisinopril, coreg, imdur    HLD (hyperlipidemia)  Continue home statin    Anemia in ESRD (end-stage renal disease)  H&H stable and consistent with baseline. No complaints of active bleeding. No indication for transfusion at this time    Chronic hypoxemic respiratory failure  Patient with Hypoxic Respiratory failure which is Chronic.  he is on home oxygen at 2\ LPM. Supplemental oxygen was provided and noted-      .   Contributing diagnoses includes - CHF Labs and images were reviewed. Patient Has not had a recent ABG. Will treat underlying causes and adjust management of respiratory failure as follows- continue home oxygen, HD for volume removal      VTE Risk Mitigation (From admission, onward)         Ordered     apixaban  tablet 5 mg  2 times daily         07/18/23 2201     IP VTE HIGH RISK PATIENT  Once         07/18/23 2118     Place sequential compression device  Until discontinued         07/18/23 2118     Place TARI hose  Until discontinued         07/18/23 2118                 Discussed with ED physician.    VTE: eliquis  Code: full  Diet: mechanical soft  Dispo: pending improvement in BG  On 07/18/2023, patient should be placed in hospital observation services under my care in collaboration with Cedrick Sales MD.      Henri Bright PA-C  Department of Hospital Medicine  [facility]

## 2023-07-19 NOTE — ASSESSMENT & PLAN NOTE
Echo 3/2023 with EF of 55% and grade 2 DD. Chest x-ray with pulmonary edema  Continue home coreg/lisinopril  Volume removal with HD  Daily weights/strict intake and output/telemetry

## 2023-07-19 NOTE — DISCHARGE SUMMARY
[facility] Medicine  Discharge Summary      Patient Name: Cosme Lewis  MRN: [id number]  [facility]: [id number]  Patient Class: OP- Observation  Admission Date: 7/18/2023  Hospital Length of Stay: 1 day   Discharge Date and Time:  07/19/2023 4:08 PM  Attending Physician: Arben Lomas MD   Discharging Provider: Arben Lomas MD  Primary Care Provider: Primary Doctor Rochelle    Primary Care Team: Networked reference to record PCT     HPI:   Cosme Lewis 57 y.o. male with CHF, ESRD on HD MWF, history of PE on eliquis, HTN, DM presents to the hospital with a chief complaint of hypoglycemia.  He originally arrived in the hospital for outpatient fistulogram due to concern for flow-limiting stenosis.  He underwent fistulogram and no flow-limiting stenosis was seen.  After the procedure he was found to be hypoglycemic and brought to the emergency room.  He denies complaints, he reports he has been eating since arrival in the emergency room.  He is unable to state what he takes medications for the nursing home.  He states he was on dialysis and uses oxygen daily.  He denies fever chest pain shortness of breath nausea vomiting abdominal pain melena hematuria hematemesis.    Discussed with his nurse at [facility] nursing home she reports he has been in his normal state of health prior to fistulogram today.  She is unsure when he got his last dose of insulin, but she assumes it was this morning at his regularly scheduled time.  He was to be NPO for the procedure.  She reports he is compliant with his home Eliquis oxygen and dialysis.  He last underwent dialysis yesterday.  At baseline he is oriented to person and place per the nurse.  She reports he is a full code.    In the ED, afebrile without leukocytosis recurrently hypoglycemic to as low as 37 chest x-ray with findings concerning for CHF CT head without acute intracranial process potassium within normal limits hemoglobin 10.8 troponin negative  lactic acid negative.      * No surgery found *      Hospital Course:   Cosme Lewis 57 y.o. male with CHF, ESRD on HD MWF, history of PE on eliquis, HTN, DM presents to the hospital with a chief complaint of hypoglycemia.  He originally arrived in the hospital for outpatient fistulogram due to concern for flow-limiting stenosis.  He underwent fistulogram and no flow-limiting stenosis was seen.  After the procedure he was found to be hypoglycemic and brought to the emergency room.  He denies complaints, he reports he has been eating since arrival in the emergency room.  He is unable to state what he takes medications for the nursing home.  He states he was on dialysis and uses oxygen daily.  He denies fever chest pain shortness of breath nausea vomiting abdominal pain melena hematuria hematemesis.     Was discussed with his nurse at [facility] nursing home she reports he has been in his normal state of health prior to fistulogram today.  She is unsure when he got his last dose of insulin, but she assumes it was this morning at his regularly scheduled time.  He was to be NPO for the procedure.  She reports he is compliant with his home Eliquis oxygen and dialysis.  He last underwent dialysis yesterday.  At baseline he is oriented to person and place per the nurse.  She reports he is a full code.     In the ED, afebrile without leukocytosis recurrently hypoglycemic to as low as 37 chest x-ray with findings concerning for CHF CT head without acute intracranial process potassium within normal limits hemoglobin 10.8 troponin negative lactic acid negative.patient was started on IVF with D 10,his hypoglycemia is resolved,patient was eating well,D 10 IVF discontinued and his blood sugar remains stable.he had his routine HD by nephrology,patient was discharged back to NH with only SSI and follow up with PCP.       Goals of Care Treatment Preferences:  Code Status: Full Code      Consults:   Consults (From admission,  onward)        Status Ordering Provider     Case Management/  Once        Provider:  (Not yet assigned)    Completed LLOYD VICK     Inpatient consult to Nephrology  Once        Provider:  Jailyn Infante MD    Acknowledged LLOYD VICK          Cardiac/Vascular  QT prolongation  EKG today with QTc over 500, chronically elevated per chart review.   Monitor on telemetry      Final Active Diagnoses:    Diagnosis Date Noted POA    PRINCIPAL PROBLEM:  Hypoglycemia [E16.2] 07/18/2023 Unknown    History of pulmonary embolism [Z86.711] 07/18/2023 Unknown    Type 2 diabetes mellitus treated with insulin [E11.9, Z79.4] 07/18/2023 Not Applicable    End-stage renal disease on hemodialysis [N18.6, Z99.2] 06/28/2023 Not Applicable    Acute on chronic diastolic heart failure [I50.33] 06/21/2023 Yes    Hypertension [I10] 03/23/2023 Yes    Anemia in ESRD (end-stage renal disease) [N18.6, D63.1] 11/18/2022 Yes     Chronic    HLD (hyperlipidemia) [E78.5] 11/18/2022 Yes     Chronic    Chronic hypoxemic respiratory failure [J96.11] 10/12/2022 Yes     Chronic    QT prolongation [R94.31] 10/12/2022 Yes      Problems Resolved During this Admission:    Diagnosis Date Noted Date Resolved POA    Type 2 diabetes mellitus with hyperglycemia, with long-term current use of insulin [E11.65, Z79.4] 03/07/2023 07/18/2023 Not Applicable     Chronic       Discharged Condition: stable    Disposition: Home or Self Care    Follow Up:   Follow-up Information     [facility] - Request Follow up on 7/19/2023.    Specialties: Nursing Home Agency, SNF Agency  Why: Nursing Home  Contact information:  [address] [facility] [address]  [phone]             Duong Aceves MD Follow up.    Specialties: Vascular Surgery, Surgery  Contact information:  [address] [address] [phone]             Duong Aceves MD .    Specialties: Vascular Surgery, Surgery  Contact  information:  [address]  [address]  [place] [address]  [phone]                       Patient Instructions:      Activity as tolerated       Significant Diagnostic Studies: Labs:   BMP:   Recent Labs   Lab 07/18/23  1442 07/18/23 2042 07/19/23  0544   * 71 82   * 136 134*   K 3.6 3.8 4.1   CL 98 97 96   CO2 28 29 28   BUN 28* 29* 30*   CREATININE 4.2* 4.4* 4.6*   CALCIUM 7.7* 8.2* 7.8*   MG  --  2.2 2.1   , CMP   Recent Labs   Lab 07/18/23  1442 07/18/23 2042 07/19/23  0544   * 136 134*   K 3.6 3.8 4.1   CL 98 97 96   CO2 28 29 28   * 71 82   BUN 28* 29* 30*   CREATININE 4.2* 4.4* 4.6*   CALCIUM 7.7* 8.2* 7.8*   PROT  --  6.8 5.9*   ALBUMIN  --  3.2* 2.8*   BILITOT  --  0.7 0.6   ALKPHOS  --  184* 151*   AST  --  22 18   ALT  --  16 13   ANIONGAP 8 10 10    and CBC   Recent Labs   Lab 07/18/23  1835 07/19/23  0544 07/19/23  1208   WBC 2.05* 1.99* 2.54*   HGB 10.8* 10.7* 10.1*   HCT 34.2* 33.9* 33.9*    175 141*     Radiology: X-Ray: CXR: X-Ray Chest 1 View (CXR): No results found for this visit on 07/18/23. and X-Ray Chest PA and Lateral (CXR): No results found for this visit on 07/18/23.    Pending Diagnostic Studies:     None         Medications:  Reconciled Home Medications:      Medication List      CHANGE how you take these medications    atorvastatin 40 MG tablet  Commonly known as: LIPITOR  Take 1 tablet (40 mg total) by mouth once daily.  What changed: when to take this        CONTINUE taking these medications    acetaminophen 650 MG Tbsr  Commonly known as: TYLENOL  Take 650 mg by mouth every 8 (eight) hours as needed (pain or fever over 100.4).     albuterol 90 mcg/actuation inhaler  Commonly known as: PROVENTIL/VENTOLIN HFA  Inhale 2 puffs into the lungs 4 (four) times daily as needed (breathing).     albuterol-ipratropium 2.5 mg-0.5 mg/3 mL nebulizer solution  Commonly known as: DUO-NEB  Take 3 mLs by nebulization every 4 (four) hours while awake. Rescue      apixaban 5 mg Tab  Commonly known as: ELIQUIS  Take 5 mg by mouth 2 (two) times daily.     artificial tears 0.5 % ophthalmic solution  Commonly known as: ISOPTO TEARS  Place 1 drop into both eyes 6 (six) times daily.     aspirin 81 MG EC tablet  Commonly known as: ECOTRIN  Take 81 mg by mouth once daily.     carvediloL 6.25 MG tablet  Commonly known as: COREG  Take 3.125 mg by mouth 2 (two) times daily.     cetirizine 10 MG tablet  Commonly known as: ZYRTEC  Take 10 mg by mouth daily as needed (itching).     cloNIDine 0.3 mg/24 hr td ptwk 0.3 mg/24 hr  Commonly known as: CATAPRES  APPLY ONE PATCH TOPICALLY EVERY WEEK FOR HIGH BLOOD PRESSURE     epoetin xavier 10,000 unit/mL injection  Commonly known as: PROCRIT  Inject 0.7 mLs (7,000 Units total) into the skin every Tues, Thurs, Sat.     FLUoxetine 40 MG capsule  Take 40 mg by mouth once daily.     fluticasone-salmeterol 250-50 mcg/dose 250-50 mcg/dose diskus inhaler  Commonly known as: ADVAIR  Inhale 1 puff into the lungs 2 (two) times daily.     GLUCAGON EMERGENCY KIT (HUMAN) 1 mg Solr  Generic drug: glucagon  1 mg into the muscle as needed if CBG < 70     HYDROPHILIC CREAM TOP  Apply liberal amount to affected area twice daily for dry skin     INCRUSE ELLIPTA 62.5 mcg/actuation inhalation capsule  Generic drug: umeclidinium  Inhale 62.5 mcg into the lungs once daily. Controller     isosorbide mononitrate 30 MG 24 hr tablet  Commonly known as: IMDUR  Take 1 tablet (30 mg total) by mouth once daily.     lisinopriL 40 MG tablet  Commonly known as: PRINIVIL,ZESTRIL  Take 1 tablet (40 mg total) by mouth every evening.     melatonin 3 mg Tbdl  Take 9 mg by mouth nightly as needed (sleep).     NEPRO CARB STEADY ORAL  Give 1 carton by mouth with each meal.     NIFEdipine 60 MG (OSM) 24 hr tablet  Commonly known as: PROCARDIA-XL  Take 1 tablet (60 mg total) by mouth 2 (two) times a day.     ondansetron 8 MG tablet  Commonly known as: ZOFRAN  Take 1 tablet by mouth 3  (three) times daily as needed for Nausea.     sevelamer carbonate 800 mg Tab  Commonly known as: RENVELA  Take 800 mg by mouth 3 (three) times daily.     sodium chloride 0.65 % nasal spray  Commonly known as: Saline NasaL  1 spray by Nasal route as needed (dry nostril).     tiotropium bromide 2.5 mcg/actuation inhaler  Commonly known as: SPIRIVA RESPIMAT  Inhale 2 puffs into the lungs Daily.     vitamin renal formula (B-complex-vitamin c-folic acid) 1 mg Cap  Commonly known as: NEPHROCAP  Take 1 capsule by mouth once daily.     white petrolatum ointment  Commonly known as: VASELINE  Apply topically once daily. Apply small amount to both nostrils daily        STOP taking these medications    insulin detemir U-100 (Levemir) 100 unit/mL (3 mL) Inpn pen     insulin lispro 100 unit/mL pen  Commonly known as: HumaLOG KwikPen Insulin            Indwelling Lines/Drains at time of discharge:   Lines/Drains/Airways     Drain  Duration                Hemodialysis AV Fistula Left upper arm -- days                Time spent on the discharge of patient: less than 30  minutes         Arben Lomas MD  Department of Hospital Medicine  [facility] Surg

## 2023-07-19 NOTE — ED NOTES
Pt's CBG was 64 mg/dl, MD notified, 8 oz Juice and 4 eun crackers given. Will continue to monitor.

## 2023-07-24 NOTE — ASSESSMENT & PLAN NOTE
Titrate insulin slowly to avoid hypoglycemia as the risk of hypoglycemia increases with decreased creatinine clearance.    Estimated Creatinine Clearance: 20.4 mL/min (A) (based on SCr of 3.9 mg/dL (H)).       n/a

## 2023-08-19 PROBLEM — T82.838A BLEEDING FROM DIALYSIS SHUNT: Status: ACTIVE | Noted: 2023-01-01

## 2023-08-20 PROBLEM — E11.00 HYPEROSMOLAR HYPERGLYCEMIC STATE (HHS): Status: ACTIVE | Noted: 2023-01-01

## 2023-08-20 NOTE — ASSESSMENT & PLAN NOTE
AVF bleeding controlled with manual pressure by ED staff. Compressive dressing placed. Vascular surgery consulted, appreciate recs:      - Obtain LUE AVG US to evaluate flow  - Will tentatively plan for fistulagram tomorrow  - NPO midnight  - Hold eliquis and aspirin

## 2023-08-20 NOTE — SUBJECTIVE & OBJECTIVE
Past Medical History:   Diagnosis Date    CHF (congestive heart failure)     Chronic kidney disease-mineral and bone disorder 10/12/2022    Chronic respiratory failure     COPD (chronic obstructive pulmonary disease)     Diabetes mellitus type 1     ESRD on dialysis     Hypertension     Hypervolemia 02/22/2023    Hyponatremia 03/04/2023    Other insomnia     Recurrent major depression     SOB (shortness of breath) 10/12/2022    Patient with history COPD treated with Ellipta the albuterol, fluticasone-salmeterol tachypneic in the ED saturating 94% and 6 L on nasal cannula, patient does not know how many  he uses it is in nursing home.    -duo nebs Q 4  Given that patient is a poor historian, and in the setting of left lower extremity edema and history of coagulopathy PE cannot be ruled out.  Will workup for possible infec    Unspecified lack of coordination        Past Surgical History:   Procedure Laterality Date    AV FISTULA PLACEMENT         Review of patient's allergies indicates:  No Known Allergies    Family History       Problem Relation (Age of Onset)    Diabetes Mother          Tobacco Use    Smoking status: Never    Smokeless tobacco: Never   Substance and Sexual Activity    Alcohol use: Not Currently    Drug use: Not on file    Sexual activity: Not Currently      Review of Systems   Constitutional:  Positive for appetite change.   Respiratory:  Positive for shortness of breath. Negative for choking and wheezing.    Cardiovascular:  Negative for chest pain.   Gastrointestinal:  Positive for abdominal distention. Negative for abdominal pain, nausea and vomiting.   Genitourinary:  Negative for hematuria.   Neurological:  Negative for light-headedness and headaches.   Psychiatric/Behavioral:  Negative for behavioral problems and confusion.      Objective:     Vital Signs (Most Recent):  Temp: 98.1 °F (36.7 °C) (08/19/23 2044)  Pulse: 68 (08/20/23 0008)  Resp: (!) 22 (08/19/23 2315)  BP: 135/79 (08/20/23  0018)  SpO2: 95 % (08/20/23 0008) Vital Signs (24h Range):  Temp:  [98.1 °F (36.7 °C)] 98.1 °F (36.7 °C)  Pulse:  [66-71] 68  Resp:  [18-22] 22  SpO2:  [95 %-100 %] 95 %  BP: (120-157)/(63-85) 135/79   Weight: 76.5 kg (168 lb 10.4 oz)  Body mass index is 24.91 kg/m².    No intake or output data in the 24 hours ending 08/20/23 0049       Physical Exam  Vitals and nursing note reviewed.   HENT:      Head: Normocephalic and atraumatic.   Cardiovascular:      Rate and Rhythm: Normal rate and regular rhythm.      Pulses: Normal pulses.   Abdominal:      General: There is distension.      Palpations: Abdomen is soft.      Tenderness: There is no abdominal tenderness. There is no guarding or rebound.   Musculoskeletal:      Comments: LUE AV fistula that's hemostatic with coban.    1+ pitting edema to BLE localized to shin   Skin:     General: Skin is warm and dry.   Neurological:      Mental Status: He is alert.            Vents:     Lines/Drains/Airways       Peripheral Intravenous Line  Duration                  Hemodialysis AV Fistula Left upper arm -- days         Peripheral IV - Single Lumen 08/19/23 2131 22 G Posterior;Right Hand <1 day                  Significant Labs:    CBC/Anemia Profile:  Recent Labs   Lab 08/19/23 2204   WBC 3.49*   HGB 8.7*   HCT 29.1*   *   MCV 96   RDW 15.6*        Chemistries:  Recent Labs   Lab 08/19/23 2204   *   K 3.9   CL 94*   CO2 25   BUN 35*   CREATININE 3.9*   CALCIUM 7.9*   ALBUMIN 3.0*   PROT 6.3   BILITOT 0.7   ALKPHOS 188*   ALT 36   AST 48*       All pertinent labs within the past 24 hours have been reviewed.    Significant Imaging: I have reviewed all pertinent imaging results/findings within the past 24 hours.

## 2023-08-20 NOTE — ASSESSMENT & PLAN NOTE
Patient's with Normocytic anemia. Hemoglobin 8.7 lower than baseline. Etiology likely due to chronic disease vs. Hemorrhage from AV fistula and epistaxis  - Hold Eliquis and  Aspirin in setting of AV fistula bleeding, pending surgical intervention

## 2023-08-20 NOTE — TRANSFER OF CARE
"Anesthesia Transfer of Care Note    Patient: Cosme Lewis    Procedure(s) Performed: Procedure(s) (LRB):  Fistulogram (Left)    Patient location: PACU    Anesthesia Type: MAC    Transport from OR: Transported from OR on 6-10 L/min O2 by face mask with adequate spontaneous ventilation    Post pain: adequate analgesia    Post assessment: no apparent anesthetic complications    Post vital signs: stable    Level of consciousness: sedated and awake    Nausea/Vomiting: no nausea/vomiting    Complications: none    Transfer of care protocol was followed      Last vitals:   Visit Vitals  BP (!) 151/86 (BP Location: Right arm, Patient Position: Lying)   Pulse 68   Temp 36.3 °C (97.4 °F) (Oral)   Resp (!) 22   Ht 5' 9" (1.753 m)   Wt 68.9 kg (151 lb 14.4 oz)   SpO2 95%   BMI 22.43 kg/m²     "

## 2023-08-20 NOTE — CONSULTS
[facility] Emergency Dept  Vascular Surgery  Consult Note    Inpatient consult to Vascular Surgery  Consult performed by: Bethanie Mccurdy MD  Consult ordered by: Tonia Hoang MD        Subjective:     Chief Complaint/Reason for Consult: Bleeding AVG    History of Present Illness: Cosme Lewis is a 58 year old male with a PMH of chronic respiratory failure, CHF, HTN,  HLD, T2DM and ESRD on dialysis (MWF) who presents to the ED for bleeding fistula, SOB with new O2 requirements and nose bleeds. His last dialysis session was on Wednesday. He had bleeding after that session and a compressive bandage was placed. He had continued bleeding when he arrived for his session on Friday and the dialysis center declined to perform dialysis. He currently lives in a facility and said that they would not bring him until today for evaluation. Additionally, he arrived with a saturated dressing that he states has been in place since Wednesday. He does have a previous history of prolonged bleeding in July and underwent a fistula gram without significant findings. He is currently taking eliquis for a history of PE.      (Not in a hospital admission)      Review of patient's allergies indicates:  No Known Allergies    Past Medical History:   Diagnosis Date    CHF (congestive heart failure)     Chronic kidney disease-mineral and bone disorder 10/12/2022    Chronic respiratory failure     COPD (chronic obstructive pulmonary disease)     Diabetes mellitus type 1     ESRD on dialysis     Hypertension     Hypervolemia 02/22/2023    Hyponatremia 03/04/2023    Other insomnia     Recurrent major depression     SOB (shortness of breath) 10/12/2022    Patient with history COPD treated with Ellipta the albuterol, fluticasone-salmeterol tachypneic in the ED saturating 94% and 6 L on nasal cannula, patient does not know how many  he uses it is in nursing home.    -duo nebs Q 4  Given that patient is a poor historian, and in the setting of left  lower extremity edema and history of coagulopathy PE cannot be ruled out.  Will workup for possible infec    Unspecified lack of coordination      Past Surgical History:   Procedure Laterality Date    AV FISTULA PLACEMENT       Family History       Problem Relation (Age of Onset)    Diabetes Mother          Tobacco Use    Smoking status: Never    Smokeless tobacco: Never   Substance and Sexual Activity    Alcohol use: Not Currently    Drug use: Not on file    Sexual activity: Not Currently     Review of Systems   Constitutional:  Negative for chills, diaphoresis, fatigue and fever.   HENT:  Positive for nosebleeds.    Respiratory:  Positive for shortness of breath. Negative for cough.    Cardiovascular:  Positive for leg swelling. Negative for chest pain and palpitations.   Skin:  Positive for wound (bleeding from fistula).   Neurological:  Negative for dizziness, syncope and light-headedness.   All other systems reviewed and are negative.    Objective:     Vital Signs (Most Recent):  Temp: 98.1 °F (36.7 °C) (08/19/23 2044)  Pulse: 71 (08/19/23 2202)  Resp: 20 (08/19/23 2131)  BP: (!) 152/74 (08/19/23 2202)  SpO2: 97 % (08/19/23 2159) Vital Signs (24h Range):  Temp:  [98.1 °F (36.7 °C)] 98.1 °F (36.7 °C)  Pulse:  [66-71] 71  Resp:  [18-20] 20  SpO2:  [96 %-97 %] 97 %  BP: (132-152)/(63-74) 152/74     Weight: 76.5 kg (168 lb 10.4 oz)  Body mass index is 24.91 kg/m².      Physical Exam  Vitals and nursing note reviewed.   Constitutional:       General: He is not in acute distress.  HENT:      Head: Normocephalic.      Nose:      Comments: Blood present in bilateral nares     Mouth/Throat:      Mouth: Mucous membranes are moist.   Cardiovascular:      Rate and Rhythm: Normal rate and regular rhythm.      Comments: LUE AVG with palpable thrill. Large pseudoaneurysm present. No active bleeding on assessement after pressure held by ED staff, however two needle holes present where bleeding occurred. No overlying  "wound or ulceration   2+ radial pulse on left  Musculoskeletal:      Right lower leg: No edema.      Left lower leg: No edema.   Skin:     General: Skin is warm and dry.   Neurological:      Mental Status: He is alert and oriented to person, place, and time.          Significant Labs:  CBC: No results for input(s): "WBC", "RBC", "HGB", "HCT", "PLT", "MCV", "MCH", "MCHC" in the last 48 hours.  CMP: No results for input(s): "GLU", "CALCIUM", "ALBUMIN", "PROT", "NA", "K", "CO2", "CL", "BUN", "CREATININE", "ALKPHOS", "ALT", "AST", "BILITOT" in the last 48 hours.  All pertinent labs from the last 24 hours have been reviewed.    Significant Diagnostics:  I have reviewed all pertinent imaging results/findings within the past 24 hours.    Assessment/Plan:     Bleeding from dialysis shunt  58 year old male with a PMH of chronic respiratory failure, CHF, HTN,  HLD, T2DM and ESRD on dialysis (MWF) who presents to the ED for bleeding fistula, SOB with new O2 requirements and nose bleeds. Bleeding controlled with manual pressure by ED staff. Compressive dressing placed.    Recommend admission to medicine for management of SOB and workup of nosebleeds  Labs are pending. If the patient has acute indications for dialysis, recommend temporary dialysis line placement. Do not use current dialysis access at this time.  Recommend INR/PTT due to bleeding from multiple sources  Obtain LUE AVG US to evaluate flow  Will tentatively plan for fistulagram tomorrow  Will obtain consent  Ok for clears now, NPO midnight        Thank you for your consult. I will follow-up with patient. Please contact us if you have any additional questions.    Bethanie Mccurdy MD  Vascular Surgery  [facility] Emergency Dept  "

## 2023-08-20 NOTE — ASSESSMENT & PLAN NOTE
Nephrology consulted for HD. Appreciate recs  Pt does not have emergent need for HD at this time; will attempt to control BP with oral medications   Continue Sevelamer 800 TID

## 2023-08-20 NOTE — NURSING
Nurses Note -- 4 Eyes      8/20/2023   7:05 AM      Skin assessed during: Admit      [x] No Altered Skin Integrity Present    [x]Prevention Measures Documented      [] Yes- Altered Skin Integrity Present or Discovered   [] LDA Added if Not in Epic (Describe Wound)   [] New Altered Skin Integrity was Present on Admit and Documented in LDA   [] Wound Image Taken    Wound Care Consulted? No    Attending Nurse:  ADELITA Asencio     Second RN/Staff Member:   ADELITA Avelar

## 2023-08-20 NOTE — ED TRIAGE NOTES
From [facility]'s. Bleeding to L upper arm fistula and nose x 3 days. Dialysis MWF, per pt unable to get dialysis F due to bleeding. + blood thinners. Pt denies injury to arm. Arm wrapped 10 min prior to arrival with gauze and ace wrap, bleeding through bandage. Pt also c/o SOB. Currently on 6 L satting 97%. Nursing staff unsure how many L pt is on at baseline.

## 2023-08-20 NOTE — SUBJECTIVE & OBJECTIVE
(Not in a hospital admission)      Review of patient's allergies indicates:  No Known Allergies    Past Medical History:   Diagnosis Date    CHF (congestive heart failure)     Chronic kidney disease-mineral and bone disorder 10/12/2022    Chronic respiratory failure     COPD (chronic obstructive pulmonary disease)     Diabetes mellitus type 1     ESRD on dialysis     Hypertension     Hypervolemia 02/22/2023    Hyponatremia 03/04/2023    Other insomnia     Recurrent major depression     SOB (shortness of breath) 10/12/2022    Patient with history COPD treated with Ellipta the albuterol, fluticasone-salmeterol tachypneic in the ED saturating 94% and 6 L on nasal cannula, patient does not know how many  he uses it is in nursing home.    -duo nebs Q 4  Given that patient is a poor historian, and in the setting of left lower extremity edema and history of coagulopathy PE cannot be ruled out.  Will workup for possible infec    Unspecified lack of coordination      Past Surgical History:   Procedure Laterality Date    AV FISTULA PLACEMENT       Family History       Problem Relation (Age of Onset)    Diabetes Mother          Tobacco Use    Smoking status: Never    Smokeless tobacco: Never   Substance and Sexual Activity    Alcohol use: Not Currently    Drug use: Not on file    Sexual activity: Not Currently     Review of Systems   Constitutional:  Negative for chills, diaphoresis, fatigue and fever.   HENT:  Positive for nosebleeds.    Respiratory:  Positive for shortness of breath. Negative for cough.    Cardiovascular:  Positive for leg swelling. Negative for chest pain and palpitations.   Skin:  Positive for wound (bleeding from fistula).   Neurological:  Negative for dizziness, syncope and light-headedness.   All other systems reviewed and are negative.    Objective:     Vital Signs (Most Recent):  Temp: 98.1 °F (36.7 °C) (08/19/23 2044)  Pulse: 71 (08/19/23 2202)  Resp: 20 (08/19/23 2131)  BP: (!) 152/74 (08/19/23  "2202)  SpO2: 97 % (08/19/23 2159) Vital Signs (24h Range):  Temp:  [98.1 °F (36.7 °C)] 98.1 °F (36.7 °C)  Pulse:  [66-71] 71  Resp:  [18-20] 20  SpO2:  [96 %-97 %] 97 %  BP: (132-152)/(63-74) 152/74     Weight: 76.5 kg (168 lb 10.4 oz)  Body mass index is 24.91 kg/m².      Physical Exam  Vitals and nursing note reviewed.   Constitutional:       General: He is not in acute distress.  HENT:      Head: Normocephalic.      Nose:      Comments: Blood present in bilateral nares     Mouth/Throat:      Mouth: Mucous membranes are moist.   Cardiovascular:      Rate and Rhythm: Normal rate and regular rhythm.      Comments: LUE AVG with palpable thrill. Large pseudoaneurysm present. No active bleeding on assessement after pressure held by ED staff, however two needle holes present where bleeding occurred. No overlying wound or ulceration   2+ radial pulse on left  Musculoskeletal:      Right lower leg: No edema.      Left lower leg: No edema.   Skin:     General: Skin is warm and dry.   Neurological:      Mental Status: He is alert and oriented to person, place, and time.          Significant Labs:  CBC: No results for input(s): "WBC", "RBC", "HGB", "HCT", "PLT", "MCV", "MCH", "MCHC" in the last 48 hours.  CMP: No results for input(s): "GLU", "CALCIUM", "ALBUMIN", "PROT", "NA", "K", "CO2", "CL", "BUN", "CREATININE", "ALKPHOS", "ALT", "AST", "BILITOT" in the last 48 hours.  All pertinent labs from the last 24 hours have been reviewed.    Significant Diagnostics:  I have reviewed all pertinent imaging results/findings within the past 24 hours.  "

## 2023-08-20 NOTE — ASSESSMENT & PLAN NOTE
HFpEF - received 120 mg of lasix in the ED. Will consult Nephrology for HD  Echo    Result Date: 3/20/2023  · The left ventricle is normal in size with normal systolic function.  · The estimated ejection fraction is 55%.  · Grade II left ventricular diastolic dysfunction.  · Moderate right ventricular enlargement with moderately reduced right   ventricular systolic function.  · Biatrial enlargement.  · Interatrial septum bows to left, consider elevated right atrial   pressure.  · Mild tricuspid regurgitation.  · The estimated PA systolic pressure is 54 mmHg.  · There is pulmonary hypertension.  · Elevated central venous pressure (15 mmHg).  · Small circumferential pericardial effusion.  · There are bilateral pleural effusions.  · There is ascites present.

## 2023-08-20 NOTE — PLAN OF CARE
MICU DAILY GOALS     Family/Goals of care/Code Status   Code Status: Full Code    24H Vital Sign Range  Temp:  [97.6 °F (36.4 °C)-98.1 °F (36.7 °C)]   Pulse:  [60-71]   Resp:  [12-22]   BP: (120-184)/(63-98)   SpO2:  [95 %-100 %]      Shift Events   No acute events throughout shift    AWAKE RASS: Goal -    Actual -      Restraint necessity: Not necessary   BREATHE SBT: Not intubated    Coordinate A & B, analgesics/sedatives Pain: managed   SAT: Not intubated   Delirium CAM-ICU: Overall CAM-ICU: Negative   Early(intubated/ Progressive (non-intubated) Mobility MOVE Screen: Pass   Activity: Activity Management: Rolling - L1   Feeding/Nutrition Diet order: Diet/Nutrition Received: NPO,     Thrombus DVT prophylaxis: VTE Required Core Measure: Per order contraindicated for SCDs/Anticoagulants   HOB Elevation Head of Bed (HOB) Positioning: HOB elevated   Ulcer Prophylaxis GI: yes   Glucose control managed Glycemic Management: blood glucose monitored, insulin infusion adjusted   Skin Skin assessed during daily assessment. LDA present.   Bowel Function no issues    Indwelling Catheter Necessity            De-escalation Antibiotics Yes       VS and assessment per flow sheet, patient progressing towards goals as tolerated, plan of care reviewed with Mr. Lewis, all concerns addressed, will continue to monitor.

## 2023-08-20 NOTE — ED PROVIDER NOTES
Encounter Date: 8/19/2023       History     Chief Complaint   Patient presents with    Bleeding/Bruising     From [facility]. Bleeding to L upper arm fistula and nose x 3 days. Dialysis MWF, per pt unable to get dialysis F due to bleeding. + blood thinners. Pt denies fall. Arm wrapped on arrival, bleeding through bandage.     HPI patient is a 58-year-old male with multiple medical problems including end-stage renal disease on dialysis, diabetes, CHF, hypertensive on dialysis Monday Wednesday Friday via left upper extremity AV fistula who presents emergency department with bleeding AV fistula.  The patient states that he last underwent dialysis on Wednesday and had to skip hemodialysis on Friday because of bleeding and has noted persistent bleeding from his left upper extremity AV fistula since his hemodialysis session on Wednesday.  Gauze and Ace bandage was placed at his nursing home prior to arrival - nursing home staff had varied reports of the duration of bleeding whether began on Wednesday or day prior to arrival.  Patient also notes multiple nosebleeds over the previous 2-3 days as well as increasing shortness of breath over the previous 2 days.    Review of patient's allergies indicates:  No Known Allergies  Past Medical History:   Diagnosis Date    CHF (congestive heart failure)     Chronic kidney disease-mineral and bone disorder 10/12/2022    Chronic respiratory failure     COPD (chronic obstructive pulmonary disease)     Diabetes mellitus type 1     ESRD on dialysis     Hypertension     Hypervolemia 02/22/2023    Hyponatremia 03/04/2023    Other insomnia     Recurrent major depression     SOB (shortness of breath) 10/12/2022    Patient with history COPD treated with Ellipta the albuterol, fluticasone-salmeterol tachypneic in the ED saturating 94% and 6 L on nasal cannula, patient does not know how many  he uses it is in nursing home.    -duo nebs Q 4  Given that patient is a poor historian, and in the  setting of left lower extremity edema and history of coagulopathy PE cannot be ruled out.  Will workup for possible infec    Unspecified lack of coordination      Past Surgical History:   Procedure Laterality Date    AV FISTULA PLACEMENT       Family History   Problem Relation Age of Onset    Diabetes Mother      Social History     Tobacco Use    Smoking status: Never    Smokeless tobacco: Never   Substance Use Topics    Alcohol use: Not Currently     Review of Systems  10 point review of systems reviewed with patient otherwise negative.     Physical Exam     Initial Vitals   BP Pulse Resp Temp SpO2   08/19/23 2043 08/19/23 2043 08/19/23 2043 08/19/23 2044 08/19/23 2043   132/63 66 18 98.1 °F (36.7 °C) 96 %      MAP       --                Physical Exam     Nursing note and vitals reviewed.  Constitutional: Patient appears well-developed and well-nourished. No distress.   HENT:   Head: Normocephalic and atraumatic.   Nose: Dried blood in BL nares  Eyes: Conjunctivae and EOM are normal. Pupils are equal, round, and reactive to light.   Neck: Neck supple.   Normal range of motion.  Cardiovascular: Normal rate, regular rhythm, normal heart sounds and intact distal pulses.   Pulmonary/Chest: Breath sounds normal.   Abdominal: Abdomen is soft. Patient exhibits no distension. There is no abdominal tenderness.   Musculoskeletal:      Cervical back: Normal range of motion and neck supple.      LUE with AV fistula with palpable thrill with oozing from proximal fistula, + arterial bleeding from distal wound      Neurological: Patient is alert and oriented to person, place, and time. No cranial nerve deficit. Gait normal. GCS score is 15.    Skin: Skin is warm and dry.  Psych: Normal mood/affect    ED Course   Procedures  Labs Reviewed   CBC W/ AUTO DIFFERENTIAL - Abnormal; Notable for the following components:       Result Value    WBC 3.49 (*)     RBC 3.02 (*)     Hemoglobin 8.7 (*)     Hematocrit 29.1 (*)     MCHC 29.9 (*)      RDW 15.6 (*)     Platelets 143 (*)     Lymph # 0.5 (*)     Lymph % 14.0 (*)     All other components within normal limits   COMPREHENSIVE METABOLIC PANEL - Abnormal; Notable for the following components:    Sodium 132 (*)     Chloride 94 (*)     Glucose 759 (*)     BUN 35 (*)     Creatinine 3.9 (*)     Calcium 7.9 (*)     Albumin 3.0 (*)     Alkaline Phosphatase 188 (*)     AST 48 (*)     eGFR 16.8 (*)     All other components within normal limits   PROTIME-INR - Abnormal; Notable for the following components:    Prothrombin Time 15.7 (*)     INR 1.5 (*)     All other components within normal limits   APTT - Abnormal; Notable for the following components:    aPTT 61.8 (*)     All other components within normal limits   TROPONIN I - Abnormal; Notable for the following components:    Troponin I 0.037 (*)     All other components within normal limits   POCT GLUCOSE - Abnormal; Notable for the following components:    POCT Glucose >500 (*)     All other components within normal limits   LACTIC ACID, PLASMA   B-TYPE NATRIURETIC PEPTIDE   BETA - HYDROXYBUTYRATE, SERUM   TYPE & SCREEN          Imaging Results              X-Ray Chest AP Portable (Final result)  Result time 08/19/23 22:26:36      Final result by Yakelin Holm MD (08/19/23 22:26:36)                   Impression:      Please see above.      Electronically signed by: Yakelin Holm MD  Date:    08/19/2023  Time:    22:26               Narrative:    EXAMINATION:  XR CHEST AP PORTABLE    CLINICAL HISTORY:  shortness of breath;    TECHNIQUE:  Single frontal view of the chest was performed.    COMPARISON:  07/18/2023    FINDINGS:  Cardiac monitoring leads overlie the chest.  There is unchanged prominence of the cardiomediastinal silhouette.  Mediastinal structures are midline.  The lungs are symmetrically expanded with diffuse increased interstitial and parenchymal attenuation and patchy bilateral opacities suggestive of pulmonary edema although findings can  be seen with underlying infectious or non-infectious inflammatory etiologies.  There is increased opacity in the right mid lung zone which could reflect fluid in the fissure.  Probable small bilateral pleural effusions.  No evidence of large pneumothorax.  Osseous structures intact.                                       Medications - No data to display  Medical Decision Making  Amount and/or Complexity of Data Reviewed  Labs: ordered.  Radiology: ordered.    Risk  OTC drugs.  Prescription drug management.    I have personally reviewed and interpreted the patients laboratory studies: Glucose > 700, hgb 8.7 from 10, CO2 WNL,   I have personally reviewed and interpreted imaging studies: CXR with diffuse pulmonary edema  I have personally reviewed and interpreted the patient's EKG: pending at the time of s/o       I have also reviewed the following:   External records:  With admission July of 2023 for left AV fistula bleed  Review of nursing home records, patient is currently treated with Eliquis                      Critical Care   Date: 08/19/2023  Performed by: Tonia Hoang MD   Authorized by: Tonia Hoang MD      Total critical care time (exclusive of procedural time) : 75 minutes, exclusive of separately billable procedures and treating other patients and teaching time.    Critical care was necessary to treat or prevent imminent or life-threatening deterioration of the following conditions:  AV fistula bleed, volume overload, hyperglycemia     Due to a high probability of clinically significant, life threatening deterioration, the patient required my highest level of preparedness to intervene emergently and I personally spent this critical care time directly and personally managing the patient. This critical care time included obtaining a history; examining the patient; pulse oximetry; ordering and review of studies; arranging urgent treatment with development of a management plan; evaluation of patient's response to  treatment; frequent reassessment, documentation, and, discussions with other providers, patient and family members.      Patient arrives to the Emergency Department with evidence of distal arterial bleed as well as proximal venous appearing bleed very small without evidence of ulceration.  Upon arrival, patient had occlusive dressing placed which was removed with palpable thrill.  After greater than 10 minutes of focal compression with quick clot, bleeding stopped along proximal venous as well as distal arterial apparently ooze.  The patient was evaluated by vascular surgery at the bedside who agrees with dressings for now given bleeding has stopped and will plan for ultrasound and likely revision and intervention tomorrow.  Should patient require emergent hemodialysis overnight, patient will require temporary dialysis placement.    The patient was also noted to have significant new oxygen requirement up to 10 L comfort flow with evidence of pulmonary edema, hyperglycemia without anion gap metabolic acidosis on labs.    At the time of sign-out patient is pending VBG, anticipate admission to ICU vs medicine for observation     Discussed with Critical Care Med who will evaluate the patient at bedside for ICU admission     Clinical Impression:   Final diagnoses:  [R58] Bleeding (Primary)  [E87.5] Hyperkalemia        ED Disposition Condition    Observation Stable                Tonia Hoang MD  08/19/23 2351       Tonia Hoang MD  08/20/23 0000

## 2023-08-20 NOTE — ASSESSMENT & PLAN NOTE
[place] [facility] [place] pH 7.41 Bicarb 25. Concern for HHS. Patient reports decreased appetite over the past 2 days and missed HD session.     Plan:  - DKA/HHS Pathway initiated  - Will keep patient on insulin ggt until BG <300  - Replete K as needed  - Start insulin gtt per protocol with q1h accuchecks while on the drip  · Long acting insulin dose:  Detemir (0.25mg/kg) daily or in 2 doses  · Short acting insulin dose:  Aspart (0.08mg/kg) units per meal  - Monitor electrolytes with BMP q4h while on insulin gtt and place on telemetry

## 2023-08-20 NOTE — ASSESSMENT & PLAN NOTE
Patient with Hypoxic Respiratory failure which is Acute on chronic.  he is on home oxygen at 2LPM. Supplemental oxygen was provided and noted-      .   Signs/symptoms of respiratory failure include- respiratory distress. Contributing diagnoses includes - CHF and COPD Labs and images were reviewed. Patient Has not had a recent ABG. Will treat underlying causes and adjust management of respiratory failure as follows- Bubble flow at 6L, lasix, and HD for volume control

## 2023-08-20 NOTE — ASSESSMENT & PLAN NOTE
AVF bleeding controlled with manual pressure by ED staff. Compressive dressing placed. Vascular surgery consulted, appreciate recs:      - Vascular Surgery following; appreciate recs  - Obtain LUE AVG US to evaluate flow  - Will tentatively plan for fistulagram tomorrow  - NPO midnight  - Hold eliquis and aspirin

## 2023-08-20 NOTE — OP NOTE
Date: 08/20/2023     Surgeon(s) and Role:   Duong Aceves MD    Assistant: Bethanie Mccurdy MD    Pre-op Diagnosis:   1. T82.858A: Stenosis of vascular prosthetic devices, implants and grafts, initial encounter  2.   Patient Active Problem List    Diagnosis Date Noted    Hyperosmolar hyperglycemic state (HHS) 08/20/2023    Bleeding from dialysis shunt 08/19/2023    Hypoglycemia 07/18/2023    History of pulmonary embolism 07/18/2023    Type 2 diabetes mellitus treated with insulin 07/18/2023    End-stage renal disease on hemodialysis 06/28/2023    AV graft malfunction 06/27/2023    Acute on chronic respiratory failure with hypoxia and hypercapnia 06/21/2023    Acute on chronic diastolic heart failure 06/21/2023    Mastoiditis of left side 06/21/2023    Moderate malnutrition 06/21/2023    Moderate nonproliferative diabetic retinopathy of both eyes with macular edema associated with type 2 diabetes mellitus 05/16/2023    Retinal tear, right 05/16/2023    Age-related nuclear cataract of both eyes 05/16/2023    Secondary hyperparathyroidism of renal origin 05/09/2023    ESRD on hemodialysis 03/31/2023    Shortness of breath 03/23/2023    Hypertension 03/23/2023    Epistaxis 03/22/2023    Scleral hemorrhage of left eye 03/19/2023    Hyperkalemia 03/17/2023    Penis abrasion, initial encounter 03/17/2023    COPD (chronic obstructive pulmonary disease) 03/15/2023    Leukopenia 03/11/2023    AVF (arteriovenous fistula) 03/11/2023    Orbital edema 03/10/2023    Hypertensive emergency 03/04/2023    Elevated liver enzymes 03/04/2023    Class 1 obesity in adult 02/22/2023    Hyperglycemia 01/05/2023    Malnutrition of mild degree 12/21/2022    Hypertensive urgency 11/21/2022    Agitation 11/19/2022    Anemia in ESRD (end-stage renal disease) 11/18/2022    Renovascular hypertension 11/18/2022    HLD (hyperlipidemia) 11/18/2022    Leg wound, left 10/14/2022    Multiple subsegmental pulmonary emboli without acute cor pulmonale  10/13/2022    Cellulitis of left lower extremity 10/12/2022    Diabetes mellitus with chronic kidney disease on chronic dialysis 10/12/2022    Chronic hypoxemic respiratory failure 10/12/2022    QT prolongation 10/12/2022    Chronic kidney disease-mineral and bone disorder 10/12/2022    Other emphysema 10/12/2022    Gram-positive bacteremia 10/12/2022    Chronic diastolic heart failure 10/12/2022    ESRD (end stage renal disease)           Post-op Diagnosis: Same     Procedure(s):   1. US guided L radial artery percutaneous access  2. LUE fistulogram  3. Central venogram  4. Moderate sedation    Anesthesia: Local MAC     Findings/Key Components:   Operative Findings: no flow limiting stenosis, abnormality in lumen of proximal fistula without flow limitation, large pseudoaneurysms to mid AVF  Strong palpable thrill     EBL: Minimal    PROCEDURE IN DETAIL:After an informed consent was obtained the patient was taken to the room and placed in the supine position.  Arm was prepped and draped in the standard surgical fashion. Under ultrasound guidance, the L radial artery was accessed with a micropuncture needle; ultrasound confirmed vessel patency, followed by placement of 4/3-Armenian micropuncture dilator. Through this, an 0.035-inch wire was placed in the short 6-Armenian sheath.   A fistulogram and central venogram was performed.  After independent review and interpretation, based upon the fistulogram results, I decided to not intervene.  Strong thrill could be felt. The sheath was removed, a vasc band was placed with good hemostasis.    MODERATE SEDATION   I was present for and monitored the patients cardio respiratory functions during the moderate sedation. See nurses notes for Intra-service start and end times, the medications their doseage and route.    Time of sedation:  20 mins.

## 2023-08-20 NOTE — CONSULTS
Pt seen and examined. Will be admitted to MICU. Full H&P to follow.     [facility]-  Critical Care Medicine  08/20/2023 2:22 AM

## 2023-08-20 NOTE — BRIEF OP NOTE
[facility] - Cardiac Medical ICU  Brief Operative Note    SUMMARY     Surgery Date: 8/20/2023     Surgeon(s) and Role:     * Duong Aceves MD - Primary     * Bethanie Mccurdy MD - Resident - Assisting    Pre-op Diagnosis:  Bleeding from dialysis shunt, initial encounter [T82.838A]    Post-op Diagnosis:  Post-Op Diagnosis Codes:     * Bleeding from dialysis shunt, initial encounter [T82.838A]    Procedure(s) (LRB):  Fistulogram (Left)  VENOGRAM, CENTRAL    Anesthesia: Local MAC    Operative Findings: no flow limiting stenosis, abnormality in lumen of proximal fistula without flow limitation, large pseudoaneurysms to mid AVF    Estimated Blood Loss: * No values recorded between 8/20/2023  1:44 PM and 8/20/2023  2:26 PM *    Estimated Blood Loss has been documented.         Specimens:   Specimen (24h ago, onward)      None            [id number]

## 2023-08-20 NOTE — ANESTHESIA PREPROCEDURE EVALUATION
[facility]  Anesthesia Pre-Operative Evaluation         Patient Name: Cosme Lewis  YOB: 1965  MRN: [id number]    SUBJECTIVE:     Pre-operative evaluation for Procedure(s) (LRB):  Fistulogram (Left)     08/20/2023    Cosme Lewis is a 58 y.o. male w/ a significant PMHx of CHF, COPD, ESRD, HLD, history of PE on eliquis, and HTN who presented with inability to dialyze since Wednesday. Pt is altered at bedside, not oriented to time/place.    Spoke to mother, Kassandra Leon, on the phone and gained consent for anesthesia.    Patient now presents for the above procedure(s).      LDA:        Peripheral IV - Single Lumen 08/19/23 2131 22 G Posterior;Right Hand (Active)   Site Assessment Clean;Dry;Intact;No redness;No swelling 08/19/23 2131   Extremity Assessment Distal to IV No abnormal discoloration;No redness;No swelling;No warmth 08/19/23 2131   Line Status Blood return noted;Saline locked;Flushed 08/19/23 2131   Dressing Status Clean;Dry;Intact 08/19/23 2131   Dressing Intervention First dressing 08/19/23 2131   Number of days: 0            Peripheral IV - Single Lumen 08/20/23 0230 20 G Anterior;Right Forearm (Active)   Number of days: 0            Hemodialysis AV Fistula Left upper arm (Active)   Number of days:        Male External Urinary Catheter 08/20/23 0430 (Active)   Number of days: 0       Prev airway: None documented.    Drips:    sodium chloride 0.9% 125 mL/hr (08/20/23 0145)    dextrose 5 % and 0.45 % NaCl      insulin regular 1 units/mL infusion orderable (HHS) 0.2 Units/kg/hr (08/20/23 3528)       Patient Active Problem List   Diagnosis    Cellulitis of left lower extremity    ESRD (end stage renal disease)    Diabetes mellitus with chronic kidney disease on chronic dialysis    Chronic hypoxemic respiratory failure    QT prolongation    Chronic kidney disease-mineral and bone disorder    Other emphysema    Gram-positive bacteremia    Chronic diastolic heart failure     Multiple subsegmental pulmonary emboli without acute cor pulmonale    Leg wound, left    Anemia in ESRD (end-stage renal disease)    Renovascular hypertension    HLD (hyperlipidemia)    Agitation    Hypertensive urgency    Malnutrition of mild degree    Hyperglycemia    Class 1 obesity in adult    Hypertensive emergency    Elevated liver enzymes    Orbital edema    Leukopenia    AVF (arteriovenous fistula)    COPD (chronic obstructive pulmonary disease)    Hyperkalemia    Penis abrasion, initial encounter    Scleral hemorrhage of left eye    Epistaxis    Shortness of breath    Hypertension    ESRD on hemodialysis    Secondary hyperparathyroidism of renal origin    Moderate nonproliferative diabetic retinopathy of both eyes with macular edema associated with type 2 diabetes mellitus    Retinal tear, right    Age-related nuclear cataract of both eyes    Acute on chronic respiratory failure with hypoxia and hypercapnia    Acute on chronic diastolic heart failure    Mastoiditis of left side    Moderate malnutrition    AV graft malfunction    End-stage renal disease on hemodialysis    Hypoglycemia    History of pulmonary embolism    Type 2 diabetes mellitus treated with insulin    Bleeding from dialysis shunt    Hyperosmolar hyperglycemic state (HHS)       Review of patient's allergies indicates:  No Known Allergies    Current Inpatient Medications:   albuterol-ipratropium  3 mL Nebulization Q6H WAKE    atorvastatin  40 mg Oral Daily    carvediloL  3.125 mg Oral BID    FLUoxetine  40 mg Oral Daily    fluticasone furoate-vilanteroL  1 puff Inhalation Daily    lisinopriL  40 mg Oral QHS    NIFEdipine  60 mg Oral BID    sevelamer carbonate  800 mg Oral TID    tiotropium bromide  2 puff Inhalation Daily       No current facility-administered medications on file prior to encounter.     Current Outpatient Medications on File Prior to Encounter   Medication Sig Dispense Refill     acetaminophen (TYLENOL) 650 MG TbSR Take 650 mg by mouth every 8 (eight) hours as needed (pain or fever over 100.4).      albuterol (PROVENTIL/VENTOLIN HFA) 90 mcg/actuation inhaler Inhale 2 puffs into the lungs 4 (four) times daily as needed (breathing).      albuterol-ipratropium (DUO-NEB) 2.5 mg-0.5 mg/3 mL nebulizer solution Take 3 mLs by nebulization every 4 (four) hours while awake. Rescue 6480 mL 0    apixaban (ELIQUIS) 5 mg Tab Take 5 mg by mouth 2 (two) times daily.      artificial tears (ISOPTO TEARS) 0.5 % ophthalmic solution Place 1 drop into both eyes 6 (six) times daily. 15 mL 0    aspirin (ECOTRIN) 81 MG EC tablet Take 81 mg by mouth once daily.      atorvastatin (LIPITOR) 40 MG tablet Take 1 tablet (40 mg total) by mouth once daily. (Patient taking differently: Take 40 mg by mouth every evening.) 90 tablet 3    carvediloL (COREG) 6.25 MG tablet Take 3.125 mg by mouth 2 (two) times daily.      cetirizine (ZYRTEC) 10 MG tablet Take 10 mg by mouth daily as needed (itching).      cloNIDine 0.3 mg/24 hr td ptwk (CATAPRES) 0.3 mg/24 hr APPLY ONE PATCH TOPICALLY EVERY WEEK FOR HIGH BLOOD PRESSURE      epoetin xavier (PROCRIT) 10,000 unit/mL injection Inject 0.7 mLs (7,000 Units total) into the skin every Tues, Thurs, Sat.      FLUoxetine 40 MG capsule Take 40 mg by mouth once daily.      fluticasone-salmeterol diskus inhaler 250-50 mcg Inhale 1 puff into the lungs 2 (two) times daily.      GLUCAGON EMERGENCY KIT, HUMAN, 1 mg injection 1 mg into the muscle as needed if CBG < 70      HYDROPHILIC CREAM TOP Apply liberal amount to affected area twice daily for dry skin      isosorbide mononitrate (IMDUR) 30 MG 24 hr tablet Take 1 tablet (30 mg total) by mouth once daily. 90 tablet 3    lisinopriL (PRINIVIL,ZESTRIL) 40 MG tablet Take 1 tablet (40 mg total) by mouth every evening. 90 tablet 3    melatonin 3 mg TbDL Take 9 mg by mouth nightly as needed (sleep).      NIFEdipine (PROCARDIA-XL) 60  MG (OSM) 24 hr tablet Take 1 tablet (60 mg total) by mouth 2 (two) times a day. 60 tablet 11    nut.tx.imp.renal fxn,lac-reduc (NEPRO CARB STEADY ORAL) Give 1 carton by mouth with each meal.      ondansetron (ZOFRAN) 8 MG tablet Take 1 tablet by mouth 3 (three) times daily as needed for Nausea.      sevelamer carbonate (RENVELA) 800 mg Tab Take 800 mg by mouth 3 (three) times daily.      sodium chloride (SALINE NASAL) 0.65 % nasal spray 1 spray by Nasal route as needed (dry nostril). 30 mL 12    tiotropium bromide (SPIRIVA RESPIMAT) 2.5 mcg/actuation inhaler Inhale 2 puffs into the lungs Daily.      umeclidinium (INCRUSE ELLIPTA) 62.5 mcg/actuation inhalation capsule Inhale 62.5 mcg into the lungs once daily. Controller      vitamin renal formula, B-complex-vitamin c-folic acid, (NEPHROCAP) 1 mg Cap Take 1 capsule by mouth once daily.      white petrolatum (VASELINE) ointment Apply topically once daily. Apply small amount to both nostrils daily 5 g 0       Past Surgical History:   Procedure Laterality Date    AV FISTULA PLACEMENT         Social History     Socioeconomic History    Marital status:    Tobacco Use    Smoking status: Never    Smokeless tobacco: Never   Substance and Sexual Activity    Alcohol use: Not Currently    Sexual activity: Not Currently     Social Determinants of Health     Financial Resource Strain: Unknown (6/21/2023)    Overall Financial Resource Strain (CARDIA)     Difficulty of Paying Living Expenses: Patient refused   Food Insecurity: No Food Insecurity (6/21/2023)    Hunger Vital Sign     Worried About Running Out of Food in the Last Year: Never true     Ran Out of Food in the Last Year: Never true   Transportation Needs: No Transportation Needs (6/21/2023)    PRAPARE - Transportation     Lack of Transportation (Medical): No     Lack of Transportation (Non-Medical): No   Physical Activity: Inactive (6/21/2023)    Exercise Vital Sign     Days of Exercise per  Week: 0 days     Minutes of Exercise per Session: 0 min   Stress: Unknown (6/21/2023)    [place] [employer] - Occupational Stress Questionnaire     Feeling of Stress : Patient refused   Social Connections: Unknown (6/21/2023)    Social Connection and Isolation Panel [NHANES]     Frequency of Communication with Friends and Family: Patient refused     Frequency of Social Gatherings with Friends and Family: Patient refused     Attends Nondenominational Services: Patient refused     Active Member of Clubs or Organizations: No     Attends Club or Organization Meetings: Never     Marital Status: Never    Housing Stability: Low Risk  (6/21/2023)    Housing Stability Vital Sign     Unable to Pay for Housing in the Last Year: No     Number of Places Lived in the Last Year: 1     Unstable Housing in the Last Year: No       OBJECTIVE:     Vital Signs Range (Last 24H):  Temp:  [36.4 °C (97.6 °F)-36.7 °C (98.1 °F)]   Pulse:  [64-71]   Resp:  [16-22]   BP: (120-175)/(63-98)   SpO2:  [95 %-100 %]       Significant Labs:  Lab Results   Component Value Date    WBC 3.15 (L) 08/20/2023    HGB 8.6 (L) 08/20/2023    HCT 27.7 (L) 08/20/2023     (L) 08/20/2023    CHOL 70 (L) 06/21/2023    TRIG 30 06/21/2023    HDL 34 (L) 06/21/2023    ALT 28 08/20/2023    AST 34 08/20/2023     08/20/2023    K 3.6 08/20/2023    CL 94 (L) 08/20/2023    CREATININE 4.0 (H) 08/20/2023    BUN 38 (H) 08/20/2023    CO2 26 08/20/2023    TSH 0.961 06/20/2023    INR 1.5 (H) 08/20/2023    HGBA1C 10.7 (H) 07/18/2023       Diagnostic Studies: No relevant studies.    EKG:   Results for orders placed or performed during the hospital encounter of 07/18/23   EKG 12-lead    Collection Time: 07/18/23  6:09 PM    Narrative    Test Reason : E16.2,    Vent. Rate : 061 BPM     Atrial Rate : 061 BPM     P-R Int : 236 ms          QRS Dur : 110 ms      QT Int : 534 ms       P-R-T Axes : 079 138 081 degrees     QTc Int : 537 ms    Sinus  rhythm with 1st degree A-V block  Right axis deviation  Prolonged QT  Abnormal ECG  When compared with ECG of 20-JUN-2023 18:28,  No significant change was found  Confirmed by Ray Pratida MD ([id number]) on 7/19/2023 8:08:55 PM    Referred By: RUTHERR   SELF           Confirmed By:Ray Partida MD       2D ECHO:  TTE:  Results for orders placed or performed during the hospital encounter of 03/16/23   Echo   Result Value Ref Range    TDI SEPTAL 0.05 m/s    LV LATERAL E/E' RATIO 10.11 m/s    LV SEPTAL E/E' RATIO 18.20 m/s    LA WIDTH 4.80 cm    TDI LATERAL 0.09 m/s    LVIDd 5.35 3.5 - 6.0 cm    IVS 0.93 0.6 - 1.1 cm    Posterior Wall 1.03 0.6 - 1.1 cm    LVIDs 4.01 (A) 2.1 - 4.0 cm    FS 25 28 - 44 %    LA volume 96.92 cm3    Sinus 2.78 cm    STJ 2.33 cm    Ascending aorta 2.81 cm    LV mass 198.19 g    LA size 4.21 cm    RVDD 4.50 cm    TAPSE 1.94 cm    RV S' 7.70 cm/s    Left Ventricle Relative Wall Thickness 0.39 cm    AV mean gradient 4 mmHg    AV valve area 2.22 cm2    AV Velocity Ratio 0.61     AV index (prosthetic) 0.76     MV valve area p 1/2 method 4.87 cm2    E/A ratio 1.21     Mean e' 0.07 m/s    E wave deceleration time 155.93 msec    LVOT diameter 1.93 cm    LVOT area 2.9 cm2    LVOT peak lj 0.88 m/s    LVOT peak VTI 21.62 cm    Ao peak lj 1.45 m/s    Ao VTI 28.45 cm    LVOT stroke volume 63.22 cm3    AV peak gradient 8 mmHg    E/E' ratio 13.00 m/s    MV Peak E Lj 0.91 m/s    TR Max Lj 3.13 m/s    MV stenosis pressure 1/2 time 45.22 ms    MV Peak A Lj 0.75 m/s    LV Systolic Volume 70.60 mL    LV Diastolic Volume 138.45 mL    RA Major Axis 5.80 cm    Left Atrium Minor Axis 5.39 cm    Left Atrium Major Axis 5.92 cm    Triscuspid Valve Regurgitation Peak Gradient 39 mmHg    LA volume (mod) 74.57 cm3    RA Width 4.19 cm    Right Atrial Pressure (from IVC) 15 mmHg    EF 55 %    TV resting pulmonary artery pressure 54 mmHg    BSA 1.86 m2    LV Systolic Volume Index 38.0 mL/m2    LV Diastolic Volume  Index 74.44 mL/m2    LA Volume Index 52.1 mL/m2    LV Mass Index 107 g/m2    LA Volume Index (Mod) 40.1 mL/m2    Narrative    · The left ventricle is normal in size with normal systolic function.  · The estimated ejection fraction is 55%.  · Grade II left ventricular diastolic dysfunction.  · Moderate right ventricular enlargement with moderately reduced right   ventricular systolic function.  · Biatrial enlargement.  · Interatrial septum bows to left, consider elevated right atrial   pressure.  · Mild tricuspid regurgitation.  · The estimated PA systolic pressure is 54 mmHg.  · There is pulmonary hypertension.  · Elevated central venous pressure (15 mmHg).  · Small circumferential pericardial effusion.  · There are bilateral pleural effusions.  · There is ascites present.          VIANNEY:  No results found for this or any previous visit.    ASSESSMENT/PLAN:                                                                                                                  08/20/2023  Cosme Lewis is a 58 y.o., male.      Pre-op Assessment    I have reviewed the Patient Summary Reports.     I have reviewed the Nursing Notes. I have reviewed the NPO Status.   I have reviewed the Medications.     Review of Systems  Anesthesia Hx:  History of prior surgery of interest to airway management or planning: Denies Family Hx of Anesthesia complications.   Denies Personal Hx of Anesthesia complications.   Cardiovascular:   Hypertension CHF    Pulmonary:   COPD    Renal/:   Chronic Renal Disease, ESRD    Endocrine:   Diabetes, type 1    Psych:   depression          Physical Exam  General: Well nourished, Cooperative and Confusion    Airway:  Mallampati: I   Mouth Opening: Normal  TM Distance: Normal  Tongue: Normal  Neck ROM: Normal ROM    Dental:  Edentulous        Anesthesia Plan  Type of Anesthesia, risks & benefits discussed:    Anesthesia Type: MAC  Intra-op Monitoring Plan: Standard ASA Monitors  Post Op Pain Control Plan:  multimodal analgesia  Induction:  IV  Airway Plan: Direct, Post-Induction  Informed Consent: Informed consent signed with the Patient representative and all parties understand the risks and agree with anesthesia plan.  All questions answered.   ASA Score: 4  Day of Surgery Review of History & Physical: H&P Update referred to the surgeon/provider.    Ready For Surgery From Anesthesia Perspective.     .

## 2023-08-20 NOTE — H&P
[facility] - Emergency Dept  Critical Care Medicine  History & Physical    Patient Name: Cosme Lewis  MRN: [id number]  Admission Date: 8/19/2023  Hospital Length of Stay: 0 days  Code Status: Full Code  Attending Physician: Jhonny Lezama MD   Primary Care Provider: Eliecer Ohara III, MD   Principal Problem: Bleeding from dialysis shunt    Subjective:     HPI:  57 y.o. male with CHF, ESRD on HD MWF, history of PE on eliquis, HTN, DM presents to the hospital with a chief complaint of inability to dialyze. The patient has a LUE fistula that was noted to have some bleeding after dialysis on Wednesday of this week. He was unable to dialyze Friday secondary to bleeding/oozing with no intervention. He was sent to the ER this evening for worsening shortness of breath and persistent bleeding at the AV fistula site.    In the ER he was noted to be hypoxemic, mildly hypercapnic with a slow bleed form the AV fistula at the previous areas of access. ER placed quick clot and pressure with eventual stopping of the bleed. Vascular surgery was consulted who plans to take him to the OR in the morning for fistula revision as he has developed significant pseudoaneurysm. He is currently on 10L NC saturating well without an immediate need for urgent dialysis (seen by nephrology in the ER as well).    ICU team has been consulted for acute hypoxemic respiratory failure in the setting of severe volume overload and inability to dialyze through his current access.       Hospital/ICU Course:  No notes on file     Past Medical History:   Diagnosis Date    CHF (congestive heart failure)     Chronic kidney disease-mineral and bone disorder 10/12/2022    Chronic respiratory failure     COPD (chronic obstructive pulmonary disease)     Diabetes mellitus type 1     ESRD on dialysis     Hypertension     Hypervolemia 02/22/2023    Hyponatremia 03/04/2023    Other insomnia     Recurrent major depression     SOB (shortness of breath)  10/12/2022    Patient with history COPD treated with Ellipta the albuterol, fluticasone-salmeterol tachypneic in the ED saturating 94% and 6 L on nasal cannula, patient does not know how many  he uses it is in nursing home.    -duo nebs Q 4  Given that patient is a poor historian, and in the setting of left lower extremity edema and history of coagulopathy PE cannot be ruled out.  Will workup for possible infec    Unspecified lack of coordination        Past Surgical History:   Procedure Laterality Date    AV FISTULA PLACEMENT         Review of patient's allergies indicates:  No Known Allergies    Family History       Problem Relation (Age of Onset)    Diabetes Mother          Tobacco Use    Smoking status: Never    Smokeless tobacco: Never   Substance and Sexual Activity    Alcohol use: Not Currently    Drug use: Not on file    Sexual activity: Not Currently      Review of Systems   Constitutional:  Positive for appetite change.   Respiratory:  Positive for shortness of breath. Negative for choking and wheezing.    Cardiovascular:  Negative for chest pain.   Gastrointestinal:  Positive for abdominal distention. Negative for abdominal pain, nausea and vomiting.   Genitourinary:  Negative for hematuria.   Neurological:  Negative for light-headedness and headaches.   Psychiatric/Behavioral:  Negative for behavioral problems and confusion.      Objective:     Vital Signs (Most Recent):  Temp: 98.1 °F (36.7 °C) (08/19/23 2044)  Pulse: 68 (08/20/23 0008)  Resp: (!) 22 (08/19/23 2315)  BP: 135/79 (08/20/23 0018)  SpO2: 95 % (08/20/23 0008) Vital Signs (24h Range):  Temp:  [98.1 °F (36.7 °C)] 98.1 °F (36.7 °C)  Pulse:  [66-71] 68  Resp:  [18-22] 22  SpO2:  [95 %-100 %] 95 %  BP: (120-157)/(63-85) 135/79   Weight: 76.5 kg (168 lb 10.4 oz)  Body mass index is 24.91 kg/m².    No intake or output data in the 24 hours ending 08/20/23 0049       Physical Exam  Vitals and nursing note reviewed.   HENT:      Head:  Normocephalic and atraumatic.   Cardiovascular:      Rate and Rhythm: Normal rate and regular rhythm.      Pulses: Normal pulses.   Abdominal:      General: There is distension.      Palpations: Abdomen is soft.      Tenderness: There is no abdominal tenderness. There is no guarding or rebound.   Musculoskeletal:      Comments: LUE AV fistula that's hemostatic with coban.    1+ pitting edema to BLE localized to shin   Skin:     General: Skin is warm and dry.   Neurological:      Mental Status: He is alert.            Vents:     Lines/Drains/Airways       Peripheral Intravenous Line  Duration                  Hemodialysis AV Fistula Left upper arm -- days         Peripheral IV - Single Lumen 08/19/23 2131 22 G Posterior;Right Hand <1 day                  Significant Labs:    CBC/Anemia Profile:  Recent Labs   Lab 08/19/23 2204   WBC 3.49*   HGB 8.7*   HCT 29.1*   *   MCV 96   RDW 15.6*        Chemistries:  Recent Labs   Lab 08/19/23 2204   *   K 3.9   CL 94*   CO2 25   BUN 35*   CREATININE 3.9*   CALCIUM 7.9*   ALBUMIN 3.0*   PROT 6.3   BILITOT 0.7   ALKPHOS 188*   ALT 36   AST 48*       All pertinent labs within the past 24 hours have been reviewed.    Significant Imaging: I have reviewed all pertinent imaging results/findings within the past 24 hours.    Assessment/Plan:     Pulmonary  Acute on chronic respiratory failure with hypoxia and hypercapnia  Patient with Hypoxic Respiratory failure which is Acute on chronic.  he is on home oxygen at 2LPM. Supplemental oxygen was provided and noted-      .   Signs/symptoms of respiratory failure include- respiratory distress. Contributing diagnoses includes - CHF and COPD Labs and images were reviewed. Patient Has not had a recent ABG. Will treat underlying causes and adjust management of respiratory failure as follows- Bubble flow at 6L, lasix, and HD for volume control    COPD (chronic obstructive pulmonary disease)  Reported h/o COPD. Continue  supplemental oxygen.    Please see hypoxemic respiratory failure    Cardiac/Vascular  * Bleeding from dialysis shunt  AVF bleeding controlled with manual pressure by ED staff. Compressive dressing placed. Vascular surgery consulted, appreciate recs:      - Vascular Surgery following; appreciate recs  - Obtain LUE AVG US to evaluate flow  - Will tentatively plan for fistulagram tomorrow  - NPO midnight  - Hold eliquis and aspirin    Hypertension  Continue patient on home Carvedilol, lisinopril, and nifedipine    HLD (hyperlipidemia)  Continue home statin    Chronic diastolic heart failure  HFpEF - received 120 mg of lasix in the ED. Will consult Nephrology for HD  Echo    Result Date: 3/20/2023  · The left ventricle is normal in size with normal systolic function.  · The estimated ejection fraction is 55%.  · Grade II left ventricular diastolic dysfunction.  · Moderate right ventricular enlargement with moderately reduced right   ventricular systolic function.  · Biatrial enlargement.  · Interatrial septum bows to left, consider elevated right atrial   pressure.  · Mild tricuspid regurgitation.  · The estimated PA systolic pressure is 54 mmHg.  · There is pulmonary hypertension.  · Elevated central venous pressure (15 mmHg).  · Small circumferential pericardial effusion.  · There are bilateral pleural effusions.  · There is ascites present.      Renal/  ESRD (end stage renal disease)  Nephrology consulted for HD. Appreciate recs  Pt does not have emergent need for HD at this time; will attempt to control BP with oral medications   Continue Sevelamer 800 TID    Oncology  Anemia in ESRD (end-stage renal disease)  Patient's with Normocytic anemia. Hemoglobin 8.7 lower than baseline. Etiology likely due to chronic disease vs. Hemorrhage from AV fistula and epistaxis  - Hold Eliquis and  Aspirin in setting of AV fistula bleeding, pending surgical intervention     Endocrine  Hyperosmolar hyperglycemic state (HHS)  Pt  admitted to [facility] with  with pH 7.41 Bicarb 25. Concern for HHS. Patient reports decreased appetite over the past 2 days and missed HD session.     Plan:  - DKA/HHS Pathway initiated  - Will keep patient on insulin ggt until BG <300  - Replete K as needed  - Start insulin gtt per protocol with q1h accuchecks while on the drip  · Long acting insulin dose:  Detemir (0.25mg/kg) daily or in 2 doses  · Short acting insulin dose:  Aspart (0.08mg/kg) units per meal  - Monitor electrolytes with BMP q4h while on insulin gtt and place on telemetry          Critical Care Daily Checklist:    A: Awake: RASS Goal/Actual Goal:    Actual:     B: Spontaneous Breathing Trial Performed?     C: SAT & SBT Coordinated?  Na                      D: Delirium: CAM-ICU     E: Early Mobility Performed? Yes   F: Feeding Goal:    Status:     Current Diet Order   Procedures    Diet NPO      AS: Analgesia/Sedation Na   T: Thromboembolic Prophylaxis Holding due to AV Fistula bleed   H: HOB > 300 Yes   U: Stress Ulcer Prophylaxis (if needed) Na   G: Glucose Control Insulin ggt   B: Bowel Function     I: Indwelling Catheter (Lines & Dutta) Necessity 2 PIV   D: De-escalation of Antimicrobials/Pharmacotherapies Continue insulin ggt    Plan for the day/ETD NPO at midnight; Fisultogram in the AM; control BG    Code Status:  Family/Goals of Care: Full Code         Critical secondary to Patient has a condition that poses threat to life and bodily function: AV Fistula bleeding and HHS     Critical care was time spent personally by me on the following activities: development of treatment plan with patient or surrogate and bedside caregivers, discussions with consultants, evaluation of patient's response to treatment, examination of patient, ordering and performing treatments and interventions, ordering and review of laboratory studies, ordering and review of radiographic studies, pulse oximetry, re-evaluation of patient's condition. This critical care  time did not overlap with that of any other provider or involve time for any procedures.     Marisol Cottrell MD  Critical Care Medicine  [facility] - Emergency Dept

## 2023-08-20 NOTE — ANESTHESIA POSTPROCEDURE EVALUATION
Anesthesia Post Evaluation    Patient: Cosme Lewis    Procedure(s) Performed: Procedure(s) (LRB):  Fistulogram (Left)  VENOGRAM, CENTRAL    Final Anesthesia Type: MAC      Patient location during evaluation: ICU  Patient participation: Yes- Able to Participate  Level of consciousness: awake  Post-procedure vital signs: reviewed and stable  Pain management: adequate  Airway patency: patent    PONV status at discharge: No PONV  Anesthetic complications: no      Cardiovascular status: hemodynamically stable  Respiratory status: unassisted and spontaneous ventilation  Hydration status: euvolemic            Vitals Value Taken Time   /78 08/20/23 1616   Temp 35 °C (95 °F) 08/20/23 1500   Pulse 62 08/20/23 1619   Resp 14 08/20/23 1619   SpO2 96 % 08/20/23 1619   Vitals shown include unvalidated device data.      No case tracking events are documented in the log.      Pain/Ady Score: No data recorded

## 2023-08-20 NOTE — PROVIDER PROGRESS NOTES - EMERGENCY DEPT.
Encounter Date: 8/19/2023    ED Physician Progress Notes        Physician Note:   Received patient at sign-out  Patient seen and examined by me.  He is on 10 L with bubble flow in no respiratory distress.  He requests to eat.  He does have epistaxis.  No further bleeding from the fistula.  On exam rhonchi are auscultated throughout.  Will attempt diuresis with 120 of IV Lasix.  MICU consulted for admission for hypoxia.  CMP reveals hyperglycemia and insulin was ordered.  No emergent electrolyte abnormalities to require emergent dialysis.  Potassium is normal.    Critical Care:  Date: 08/20/2023  Performed by:  Esa Bonner MD  Authorized by: Esa Bonner MD  Total critical care time (exclusive of procedural time) : 30 minutes  Critical care was necessary to treat or prevent imminent or life-threatening deterioration of the following conditions:  hypoxia

## 2023-08-20 NOTE — HPI
57 y.o. male with CHF, ESRD on HD MWF, history of PE on eliquis, HTN, DM presents to the hospital with a chief complaint of inability to dialyze. The patient has a LUE fistula that was noted to have some bleeding after dialysis on Wednesday of this week. He was unable to dialyze Friday secondary to bleeding/oozing with no intervention. He was sent to the ER this evening for worsening shortness of breath and persistent bleeding at the AV fistula site.    In the ER he was noted to be hypoxemic, mildly hypercapnic with a slow bleed form the AV fistula at the previous areas of access. ER placed quick clot and pressure with eventual stopping of the bleed. Vascular surgery was consulted who plans to take him to the OR in the morning for fistula revision as he has developed significant pseudoaneurysm. He is currently on 10L NC saturating well without an immediate need for urgent dialysis (seen by nephrology in the ER as well).    ICU team has been consulted for acute hypoxemic respiratory failure in the setting of severe volume overload and inability to dialyze through his current access.

## 2023-08-20 NOTE — ASSESSMENT & PLAN NOTE
58 year old male with a PMH of chronic respiratory failure, CHF, HTN,  HLD, T2DM and ESRD on dialysis (MWF) who presents to the ED for bleeding fistula, SOB with new O2 requirements and nose bleeds. Bleeding controlled with manual pressure by ED staff. Compressive dressing placed.    Recommend admission to medicine for management of SOB and workup of nosebleeds  Labs are pending. If the patient has acute indications for dialysis, recommend temporary dialysis line placement. Do not use current dialysis access at this time.  Recommend INR/PTT due to bleeding from multiple sources  Obtain LUE AVG US to evaluate flow  Will tentatively plan for fistulagram tomorrow  Will obtain consent  Ok for clears now, NPO midnight

## 2023-08-20 NOTE — HPI
Cosme Lewis is a 58 year old male with a PMH of chronic respiratory failure, CHF, HTN,  HLD, T2DM and ESRD on dialysis (MWF) who presents to the ED for bleeding fistula, SOB with new O2 requirements and nose bleeds. His last dialysis session was on Wednesday. He had bleeding after that session and a compressive bandage was placed. He had continued bleeding when he arrived for his session on Friday and the dialysis center declined to perform dialysis. He currently lives in a facility and said that they would not bring him until today for evaluation. Additionally, he arrived with a saturated dressing that he states has been in place since Wednesday. He does have a previous history of prolonged bleeding in July and underwent a fistula gram without significant findings. He is currently taking eliquis for a history of PE.

## 2023-08-21 PROBLEM — E11.65 HYPERGLYCEMIA DUE TO TYPE 2 DIABETES MELLITUS: Status: ACTIVE | Noted: 2023-01-01

## 2023-08-21 NOTE — PROGRESS NOTES
[facility]  Critical Care Medicine  Progress Note    Patient Name: Cosme Lewis  MRN: [id number]  Admission Date: 8/19/2023  Hospital Length of Stay: 1 days  Code Status: Full Code  Attending Provider: Chente Newell MD  Primary Care Provider: Eliecer Ohara III, MD   Principal Problem: Bleeding from dialysis shunt    Subjective:     HPI:  57 y.o. male with CHF, ESRD on HD MWF, history of PE on eliquis, HTN, DM presents to the hospital with a chief complaint of inability to dialyze. The patient has a LUE fistula that was noted to have some bleeding after dialysis on Wednesday of this week. He was unable to dialyze Friday secondary to bleeding/oozing with no intervention. He was sent to the ER this evening for worsening shortness of breath and persistent bleeding at the AV fistula site.    In the ER he was noted to be hypoxemic, mildly hypercapnic with a slow bleed form the AV fistula at the previous areas of access. ER placed quick clot and pressure with eventual stopping of the bleed. Vascular surgery was consulted who plans to take him to the OR in the morning for fistula revision as he has developed significant pseudoaneurysm. He is currently on 10L NC saturating well without an immediate need for urgent dialysis (seen by nephrology in the ER as well).    ICU team has been consulted for acute hypoxemic respiratory failure in the setting of severe volume overload and inability to dialyze through his current access.       Hospital/ICU Course:  Admitted to MICU for concerns of AV fistula malfunction. Started on insulin drip temporarily for hyperglycemia and transitioned to scheduled insulin.  LUE U/S noted AV fistula was patent. Vascular surgery evaluation noted no flow limiting stenosis, abnormality in lumen of proximal fistula without flow limitation, large pseudoaneurysms to mid AVF. Nephrology consulted for dialysis.      Interval History/Significant Events: No acute events overnight, afebrile,  hemodynamically stable. Mild hyperkalemia on morning labs, Nephrology consulted for dialysis. Reports episode of nosebleed overnight that resolved this morning.     Review of Systems   Constitutional:  Negative for chills and fever.   HENT:  Positive for nosebleeds. Negative for congestion and rhinorrhea.    Respiratory:  Positive for shortness of breath. Negative for cough.    Cardiovascular:  Negative for chest pain and leg swelling.   Gastrointestinal:  Negative for constipation, diarrhea, nausea and vomiting.   Genitourinary:  Positive for difficulty urinating (ESRD on HD). Negative for dysuria.   Musculoskeletal:  Negative for arthralgias, back pain and myalgias.   Neurological:  Negative for dizziness and headaches.   Psychiatric/Behavioral:  Negative for sleep disturbance.      Objective:     Vital Signs (Most Recent):  Temp: 98 °F (36.7 °C) (08/21/23 1100)  Pulse: 67 (08/21/23 1200)  Resp: 18 (08/21/23 1200)  BP: (!) 182/103 (08/21/23 1200)  SpO2: 95 % (08/21/23 1200) Vital Signs (24h Range):  Temp:  [95 °F (35 °C)-98.8 °F (37.1 °C)] 98 °F (36.7 °C)  Pulse:  [60-79] 67  Resp:  [12-38] 18  SpO2:  [89 %-99 %] 95 %  BP: (122-188)/() 182/103   Weight: 68.9 kg (151 lb 14.4 oz)  Body mass index is 22.43 kg/m².      Intake/Output Summary (Last 24 hours) at 8/21/2023 1216  Last data filed at 8/21/2023 0701  Gross per 24 hour   Intake 345.31 ml   Output 0 ml   Net 345.31 ml          Physical Exam  Vitals and nursing note reviewed.   Constitutional:       General: He is not in acute distress.     Appearance: He is not ill-appearing, toxic-appearing or diaphoretic.      Interventions: Nasal cannula in place.   HENT:      Head: Normocephalic and atraumatic.      Nose:      Comments: Dried blood at nares     Mouth/Throat:      Mouth: Mucous membranes are moist.   Eyes:      General: No scleral icterus.  Cardiovascular:      Rate and Rhythm: Normal rate and regular rhythm.      Pulses: Normal pulses.   Pulmonary:       Effort: Pulmonary effort is normal. No respiratory distress.      Breath sounds: No wheezing or rales.   Abdominal:      General: There is distension.      Palpations: Abdomen is soft.      Tenderness: There is no abdominal tenderness. There is no guarding or rebound.   Musculoskeletal:      Right lower leg: Edema (trace) present.      Left lower leg: Edema (trace) present.   Skin:     General: Skin is warm and dry.      Coloration: Skin is not jaundiced.   Neurological:      Mental Status: He is alert and oriented to person, place, and time. Mental status is at baseline.   Psychiatric:         Mood and Affect: Mood normal.         Behavior: Behavior normal.            Vents:     Lines/Drains/Airways       Drain  Duration             Male External Urinary Catheter 08/20/23 0430 1 day              Peripheral Intravenous Line  Duration                  Hemodialysis AV Fistula Left upper arm -- days         Peripheral IV - Single Lumen 08/19/23 2131 22 G Posterior;Right Hand 1 day         Peripheral IV - Single Lumen 08/20/23 0230 20 G Anterior;Right Forearm 1 day                  Significant Labs: All pertinent labs within the past 24 hours have been reviewed.  LABS:  Recent Labs   Lab 08/21/23  0023 08/21/23  0402 08/21/23  0852   * 134* 133*   K 5.3* 5.3* 5.0   CL 94* 95 95   CO2 28 26 27   BUN 43* 46* 48*   CREATININE 4.6* 4.7* 4.7*   * 222* 166*   ANIONGAP 11 13 11     Recent Labs   Lab 08/20/23  0441 08/21/23  0402   MG 2.2 2.1   PHOS 3.2 3.6     Recent Labs   Lab 08/19/23 2204 08/20/23  0441 08/21/23  0402   AST 48* 34 26   ALT 36 28 16   ALKPHOS 188* 177* 154*   BILITOT 0.7 0.8 0.6   ALBUMIN 3.0* 3.1* 2.8*     POCT Glucose:   Recent Labs   Lab 08/21/23  0022 08/21/23  0556 08/21/23  1159   POCTGLUCOSE 195* 273* 130*    Recent Labs   Lab 08/19/23 2204 08/20/23  0411 08/20/23  0441 08/21/23  0402   WBC 3.49*  --  3.15* 4.45   HGB 8.7*  --  8.6* 8.7*   HCT 29.1* 31* 27.7* 27.0*   *  --   130* 156   GRAN 70.6  2.5  --  73.0  2.3 71.3  3.2        Micro  Blood Cultures  Lab Results   Component Value Date    LABBLOO No Growth after 4 days. 07/18/2023    LABBLOO No Growth after 4 days. 07/18/2023    LABBLOO No growth after 5 days. 06/21/2023    LABBLOO No growth after 5 days. 06/21/2023    LABBLOO No growth after 5 days. 03/10/2023    LABBLOO No growth after 5 days. 03/10/2023       Significant Imaging: I have reviewed all pertinent imaging results/findings within the past 24 hours.    US Extremity Non Vascular Limited Left   Final Result      As above.      Electronically signed by resident: Ray Renner   Date:    08/20/2023   Time:    09:34      Electronically signed by: Hal Garcia Jr   Date:    08/20/2023   Time:    09:41      X-Ray Chest AP Portable   Final Result      Please see above.         Electronically signed by: Yakelin Holm MD   Date:    08/19/2023   Time:    22:26          Inpatient Medications:  Continuous Infusions:   dextrose 5 % and 0.45 % NaCl Stopped (08/20/23 1216)     Scheduled Meds:   albuterol-ipratropium  3 mL Nebulization Q6H WAKE    apixaban  5 mg Oral BID    atorvastatin  40 mg Oral Daily    carvediloL  3.125 mg Oral BID    FLUoxetine  40 mg Oral Daily    fluticasone furoate-vilanteroL  1 puff Inhalation Daily    insulin aspart U-100  4 Units Subcutaneous TIDWM    insulin detemir U-100  7 Units Subcutaneous BID    lisinopriL  40 mg Oral QHS    mupirocin   Nasal BID    NIFEdipine  60 mg Oral BID    sevelamer carbonate  800 mg Oral TID    tiotropium bromide  2 puff Inhalation Daily     PRN Meds:acetaminophen, dextrose 10%, dextrose 10%, dextrose 10%, dextrose 10%, dextrose 10%, dextrose 10%, dextrose 5 % and 0.45 % NaCl, glucagon (human recombinant), hydrALAZINE, insulin aspart U-100, labetalol, melatonin, ondansetron, oxymetazoline, QUEtiapine, sodium chloride 0.9%, sodium chloride 0.9%        ABG  Recent Labs   Lab 08/19/23  7197   PH 7.412   PO2 74*  "  PCO2 47.4*   HCO3 30.2*   BE 6     Assessment/Plan:     ENT  Epistaxis  Hx of recurrent epistaxis    Differential diagnoses of etiology include, but not limited to: Anticoagulation use, home oxygen use, HTN    PLAN:  -Trial of Afrin nasal spray  PRN  - Resume eliquis and will evaluate aspirin resumption as tolerated(concerns for epistaxis)  -See "Hypertension" A&P      Pulmonary  Acute on chronic respiratory failure with hypoxia and hypercapnia  Patient with Hypoxic Respiratory failure which is Acute on chronic.  he is on home oxygen at 2LPM.   .   Signs/symptoms of respiratory failure include- respiratory distress. Contributing diagnoses includes - CHF and COPD Labs and images were reviewed. Patient Has not had a recent ABG. Will treat underlying causes and adjust management of respiratory failure as follows- Wean supplemental oxygen as tolerated for SaO2 goal >90% and HD for volume control    COPD (chronic obstructive pulmonary disease)  Reported h/o COPD. Continue supplemental oxygen.    Please see hypoxemic respiratory failure    Cardiac/Vascular  * Bleeding from dialysis shunt  AVF bleeding controlled with manual pressure by ED staff. Compressive dressing placed.   -LUE U/S noted AV fistula was patent.   -Vascular surgery evaluation noted no flow limiting stenosis, abnormality in lumen of proximal fistula without flow limitation, large pseudoaneurysms to mid AVF.     PLAN:  -Nephrology consulted for dialysis.  - Resume eliquis and will evaluate aspirin resumption as tolerated(concerns for epistaxis)    Hypertension  Continue patient on home Carvedilol, lisinopril, and nifedipine    HLD (hyperlipidemia)  Continue home statin    Chronic diastolic heart failure  HFpEF - received 120 mg of lasix in the ED.   Echo    Result Date: 3/20/2023  · The left ventricle is normal in size with normal systolic function.  · The estimated ejection fraction is 55%.  · Grade II left ventricular diastolic dysfunction.  · Moderate " "right ventricular enlargement with moderately reduced right   ventricular systolic function.  · Biatrial enlargement.  · Interatrial septum bows to left, consider elevated right atrial   pressure.  · Mild tricuspid regurgitation.  · The estimated PA systolic pressure is 54 mmHg.  · There is pulmonary hypertension.  · Elevated central venous pressure (15 mmHg).  · Small circumferential pericardial effusion.  · There are bilateral pleural effusions.  · There is ascites present.      Will consult Nephrology for HD      Renal/  ESRD on hemodialysis  See "ESRD (end stage renal disease)" A&P      Chronic kidney disease-mineral and bone disorder  See "ESRD (end stage renal disease)" A&P      ESRD (end stage renal disease)  Recent Labs     08/19/23 2204 08/20/23  0441 08/20/23  0804 08/20/23 2023 08/21/23  0023 08/21/23  0402   * 136   < > 135* 133* 134*   K 3.9 3.6   < > 5.0 5.3* 5.3*   CO2 25 26   < > 27 28 26   BUN 35* 38*   < > 40* 43* 46*   CREATININE 3.9* 4.0*   < > 4.2* 4.6* 4.7*   PHOS  --  3.2  --   --   --  3.6   CALCIUM 7.9* 7.7*   < > 7.9* 7.8* 7.7*   ALBUMIN 3.0* 3.1*  --   --   --  2.8*    < > = values in this interval not displayed.         Nephrology consulted for HD. Appreciate recs  Continue Sevelamer 800 TID    Oncology  Anemia in ESRD (end-stage renal disease)  -Patient's with Normocytic anemia..   -Hemoglobin stable.   -Etiology likely due to chronic disease .  -Current CBC reviewed-  Recent Labs   Lab 08/19/23 2204 08/20/23 0441 08/21/23  0402   HGB 8.7* 8.6* 8.7*       PLAN  - Monitor CBC and transfuse if Hgb < 7     Endocrine  Hyperglycemia due to type 2 diabetes mellitus  Hyperglycemic on admission requiring insulin infusion temporarily    Home regimen: No insulin listed on home meds      Last A1c:   Lab Results   Component Value Date    HGBA1C 10.7 (H) 07/18/2023      Recent Labs     08/20/23  1833 08/20/23  1835 08/20/23 2022 08/21/23  0022 08/21/23  0556 08/21/23  1159   POCTGLUCOSE " 57* 64* 112* 195* 273* 130*         Plan:  Antihyperglycemics (From admission, onward)    Start     Stop Route Frequency Ordered    08/21/23 1130  insulin aspart U-100 pen 4 Units         -- SubQ 3 times daily with meals 08/21/23 0749    08/21/23 0900  insulin detemir U-100 (Levemir) pen 7 Units         -- SubQ 2 times daily 08/21/23 0750    08/20/23 1259  insulin aspart U-100 pen 1-10 Units         -- SubQ Every 6 hours PRN 08/20/23 1201      - Renal diet  - POCT glucose ACHS  - Will monitor glucose results and adjust insulin regimen accordingly     Critical Care Daily Checklist:    A: Awake: RASS Goal/Actual Goal: RASS Goal: 0-->alert and calm  Actual:     B: Spontaneous Breathing Trial Performed?  NA   C: SAT & SBT Coordinated?  NA                      D: Delirium: CAM-ICU Overall CAM-ICU: Negative   E: Early Mobility Performed? Yes   F: Feeding Goal:    Status:     Current Diet Order   Procedures    Diet renal      AS: Analgesia/Sedation NA   T: Thromboembolic Prophylaxis Eliquis   H: HOB > 300 Yes   U: Stress Ulcer Prophylaxis (if needed) NA   G: Glucose Control Scheduled insulin   B: Bowel Function Stool Occurrence: 1   I: Indwelling Catheter (Lines & Dutta) Necessity PIV, AV fistula   D: De-escalation of Antimicrobials/Pharmacotherapies NA    Plan for the day/ETD Stepdown    Code Status:  Family/Goals of Care: Full Code  NA       Critical secondary to Patient has a condition that poses threat to life and bodily function: ESRD with concerns for emergent HD(resolved, pending stepdown)      Critical care was time spent personally by me on the following activities: development of treatment plan with patient or surrogate and bedside caregivers, discussions with consultants, evaluation of patient's response to treatment, examination of patient, ordering and performing treatments and interventions, ordering and review of laboratory studies, ordering and review of radiographic studies, pulse oximetry, re-evaluation  of patient's condition. This critical care time did not overlap with that of any other provider or involve time for any procedures.     Gary Howe DO  Critical Care Medicine  [facility]

## 2023-08-21 NOTE — ASSESSMENT & PLAN NOTE
58 year old male with a PMH of chronic respiratory failure, CHF, HTN,  HLD, T2DM and ESRD on dialysis (MWF) who presents to the ED for bleeding fistula, SOB with new O2 requirements and nose bleeds. Bleeding controlled with manual pressure by ED staff. Compressive dressing placed.    Now s/p fistulogram on 7/20  Good thrill, flow through AVF  OK to use L AVF for HD  Rest of care to medicine for management of SOB and workup of nosebleeds  Vascular surgery will continue to follow peripherally, please reach out if any questions arise

## 2023-08-21 NOTE — SUBJECTIVE & OBJECTIVE
Past Medical History:   Diagnosis Date    CHF (congestive heart failure)     Chronic kidney disease-mineral and bone disorder 10/12/2022    Chronic respiratory failure     COPD (chronic obstructive pulmonary disease)     Diabetes mellitus type 1     ESRD on dialysis     Hypertension     Hypervolemia 02/22/2023    Hyponatremia 03/04/2023    Other insomnia     Recurrent major depression     SOB (shortness of breath) 10/12/2022    Patient with history COPD treated with Ellipta the albuterol, fluticasone-salmeterol tachypneic in the ED saturating 94% and 6 L on nasal cannula, patient does not know how many  he uses it is in nursing home.    -duo nebs Q 4  Given that patient is a poor historian, and in the setting of left lower extremity edema and history of coagulopathy PE cannot be ruled out.  Will workup for possible infec    Unspecified lack of coordination        Past Surgical History:   Procedure Laterality Date    AV FISTULA PLACEMENT      FISTULOGRAM Left 8/20/2023    Procedure: Fistulogram;  Surgeon: Duong Aceves MD;  Location: [facility] OR [address] [place] [address];  Service: Vascular;  Laterality: Left;  1.6 MIN  55.57 mGy  10.4137 Gycm2  24 ml dye  4 ml transradial flush    PHLEBOGRAPHY  8/20/2023    Procedure: VENOGRAM, CENTRAL;  Surgeon: Duong Aceves MD;  Location: [facility] OR [address] [place] [address];  Service: Vascular;;       Review of patient's allergies indicates:  No Known Allergies  Current Facility-Administered Medications   Medication Frequency    0.9%  NaCl infusion Once    acetaminophen tablet 650 mg Q4H PRN    albuterol-ipratropium 2.5 mg-0.5 mg/3 mL nebulizer solution 3 mL Q6H WAKE    apixaban tablet 5 mg BID    atorvastatin tablet 40 mg Daily    carvediloL tablet 3.125 mg BID    dextrose 10% bolus 125 mL 125 mL PRN    dextrose 10% bolus 125 mL 125 mL PRN    dextrose 10% bolus 125 mL 125 mL PRN    dextrose 10% bolus 250 mL 250 mL PRN    dextrose 10% bolus 250 mL 250 mL PRN    dextrose 10% bolus 250 mL 250 mL PRN     dextrose 5 % and 0.45 % NaCl infusion Continuous PRN    FLUoxetine capsule 40 mg Daily    fluticasone furoate-vilanteroL 100-25 mcg/dose diskus inhaler 1 puff Daily    glucagon (human recombinant) injection 1 mg PRN    hydrALAZINE tablet 25 mg Q8H PRN    insulin aspart U-100 pen 1-10 Units Q6H PRN    insulin aspart U-100 pen 4 Units TIDWM    insulin detemir U-100 (Levemir) pen 7 Units BID    labetalol 20 mg/4 mL (5 mg/mL) IV syring Q6H PRN    lisinopriL tablet 40 mg QHS    melatonin tablet 6 mg Nightly PRN    mupirocin 2 % ointment BID    NIFEdipine 24 hr tablet 60 mg BID    ondansetron injection 4 mg Q6H PRN    oxymetazoline 0.05 % nasal spray 2 spray BID PRN    QUEtiapine tablet 25 mg Nightly PRN    sevelamer carbonate tablet 800 mg TID    sodium chloride 0.9% flush 10 mL PRN    sodium chloride 0.9% flush 10 mL PRN    tiotropium bromide 2.5 mcg/actuation inhaler 2 puff Daily     Family History       Problem Relation (Age of Onset)    Diabetes Mother          Tobacco Use    Smoking status: Never    Smokeless tobacco: Never   Substance and Sexual Activity    Alcohol use: Not Currently    Drug use: Not on file    Sexual activity: Not Currently     Review of Systems   Constitutional: Negative.    HENT: Negative.     Eyes: Negative.    Respiratory:  Positive for shortness of breath.    Cardiovascular: Negative.    Gastrointestinal: Negative.    Genitourinary:         Minimal to anuric at baseline    Musculoskeletal: Negative.    Skin: Negative.    Neurological: Negative.    Psychiatric/Behavioral: Negative.       Objective:     Vital Signs (Most Recent):  Temp: 98.6 °F (37 °C) (08/21/23 0900)  Pulse: 70 (08/21/23 1045)  Resp: (!) 24 (08/21/23 1045)  BP: (!) 164/95 (08/21/23 1045)  SpO2: 95 % (08/21/23 1045) Vital Signs (24h Range):  Temp:  [95 °F (35 °C)-98.8 °F (37.1 °C)] 98.6 °F (37 °C)  Pulse:  [60-79] 70  Resp:  [12-38] 24  SpO2:  [89 %-99 %] 95 %  BP: (122-165)/(67-95) 164/95     Weight: 68.9 kg (151 lb 14.4 oz)  (08/20/23 0525)  Body mass index is 22.43 kg/m².  Body surface area is 1.83 meters squared.    I/O last 3 completed shifts:  In: 1709.3 [I.V.:1379.6; IV Piggyback:329.6]  Out: 0      Physical Exam  Constitutional:       Appearance: Normal appearance.   HENT:      Head: Normocephalic and atraumatic.      Nose: Nose normal.      Mouth/Throat:      Mouth: Mucous membranes are moist.      Pharynx: Oropharynx is clear.   Eyes:      Conjunctiva/sclera: Conjunctivae normal.   Cardiovascular:      Rate and Rhythm: Normal rate.      Comments: LUE AVF with +thrill and +bruit   Pulmonary:      Effort: Pulmonary effort is normal.   Abdominal:      General: There is distension.   Musculoskeletal:         General: Normal range of motion.      Cervical back: Normal range of motion and neck supple.   Skin:     General: Skin is warm and dry.   Neurological:      General: No focal deficit present.      Mental Status: He is alert and oriented to person, place, and time.   Psychiatric:         Mood and Affect: Mood normal.         Behavior: Behavior normal.          Significant Labs:  CBC:   Recent Labs   Lab 08/21/23  0402   WBC 4.45   RBC 2.87*   HGB 8.7*   HCT 27.0*      MCV 94   MCH 30.3   MCHC 32.2     CMP:   Recent Labs   Lab 08/21/23  0402 08/21/23  0852   * 166*   CALCIUM 7.7* 8.0*   ALBUMIN 2.8*  --    PROT 5.9*  --    * 133*   K 5.3* 5.0   CO2 26 27   CL 95 95   BUN 46* 48*   CREATININE 4.7* 4.7*   ALKPHOS 154*  --    ALT 16  --    AST 26  --    BILITOT 0.6  --      All labs within the past 24 hours have been reviewed.

## 2023-08-21 NOTE — SUBJECTIVE & OBJECTIVE
Medications:  Continuous Infusions:   dextrose 5 % and 0.45 % NaCl Stopped (08/20/23 1216)     Scheduled Meds:   albuterol-ipratropium  3 mL Nebulization Q6H WAKE    atorvastatin  40 mg Oral Daily    carvediloL  3.125 mg Oral BID    FLUoxetine  40 mg Oral Daily    fluticasone furoate-vilanteroL  1 puff Inhalation Daily    insulin aspart U-100  4 Units Subcutaneous TIDWM    insulin detemir U-100  7 Units Subcutaneous BID    lisinopriL  40 mg Oral QHS    mupirocin   Nasal BID    NIFEdipine  60 mg Oral BID    sevelamer carbonate  800 mg Oral TID    tiotropium bromide  2 puff Inhalation Daily     PRN Meds:acetaminophen, dextrose 10%, dextrose 10%, dextrose 10%, dextrose 10%, dextrose 10%, dextrose 10%, dextrose 5 % and 0.45 % NaCl, glucagon (human recombinant), hydrALAZINE, insulin aspart U-100, labetalol, melatonin, ondansetron, oxymetazoline, QUEtiapine, sodium chloride 0.9%, sodium chloride 0.9%     Objective:     Vital Signs (Most Recent):  Temp: 97.8 °F (36.6 °C) (08/21/23 0701)  Pulse: 79 (08/21/23 0727)  Resp: 19 (08/21/23 0727)  BP: (!) 163/86 (08/21/23 0701)  SpO2: (!) 94 % (08/21/23 0727) Vital Signs (24h Range):  Temp:  [95 °F (35 °C)-98.8 °F (37.1 °C)] 97.8 °F (36.6 °C)  Pulse:  [60-79] 79  Resp:  [12-38] 19  SpO2:  [89 %-99 %] 94 %  BP: (122-164)/(67-91) 163/86     Date 08/21/23 0700 - 08/22/23 0659   Shift 4801-0557 0036-6121 8966-9070 24 Hour Total   INTAKE   Shift Total(mL/kg)       OUTPUT   Urine(mL/kg/hr) 0   0   Shift Total(mL/kg) 0(0)   0(0)   Weight (kg) 68.9 68.9 68.9 68.9        Physical Exam  Vitals and nursing note reviewed.   Constitutional:       General: He is not in acute distress.  HENT:      Head: Normocephalic.      Nose:      Comments: Blood present in bilateral nares     Mouth/Throat:      Mouth: Mucous membranes are moist.   Cardiovascular:      Rate and Rhythm: Normal rate and regular rhythm.      Comments: LUE AVG with palpable thrill  2+ radial pulse on left  Pulmonary:       Effort: Pulmonary effort is normal.   Musculoskeletal:         General: Normal range of motion.      Right lower leg: No edema.      Left lower leg: No edema.   Skin:     General: Skin is warm and dry.   Neurological:      Mental Status: He is alert and oriented to person, place, and time.          Significant Labs:  CBC:   Recent Labs   Lab 08/21/23  0402   WBC 4.45   RBC 2.87*   HGB 8.7*   HCT 27.0*      MCV 94   MCH 30.3   MCHC 32.2     CMP:   Recent Labs   Lab 08/21/23  0402   *   CALCIUM 7.7*   ALBUMIN 2.8*   PROT 5.9*   *   K 5.3*   CO2 26   CL 95   BUN 46*   CREATININE 4.7*   ALKPHOS 154*   ALT 16   AST 26   BILITOT 0.6       Significant Diagnostics:  I have reviewed all pertinent imaging results/findings within the past 24 hours.

## 2023-08-21 NOTE — ASSESSMENT & PLAN NOTE
Patient with Hypoxic Respiratory failure which is Acute on chronic.  he is on home oxygen at 2LPM.   .   Signs/symptoms of respiratory failure include- respiratory distress. Contributing diagnoses includes - CHF and COPD Labs and images were reviewed. Patient Has not had a recent ABG. Will treat underlying causes and adjust management of respiratory failure as follows- Wean supplemental oxygen as tolerated for SaO2 goal >90% and HD for volume control

## 2023-08-21 NOTE — SUBJECTIVE & OBJECTIVE
Interval History/Significant Events: No acute events overnight, afebrile, hemodynamically stable. Mild hyperkalemia on morning labs, Nephrology consulted for dialysis. Reports episode of nosebleed overnight that resolved this morning.     Review of Systems   Constitutional:  Negative for chills and fever.   HENT:  Positive for nosebleeds. Negative for congestion and rhinorrhea.    Respiratory:  Positive for shortness of breath. Negative for cough.    Cardiovascular:  Negative for chest pain and leg swelling.   Gastrointestinal:  Negative for constipation, diarrhea, nausea and vomiting.   Genitourinary:  Positive for difficulty urinating (ESRD on HD). Negative for dysuria.   Musculoskeletal:  Negative for arthralgias, back pain and myalgias.   Neurological:  Negative for dizziness and headaches.   Psychiatric/Behavioral:  Negative for sleep disturbance.      Objective:     Vital Signs (Most Recent):  Temp: 98 °F (36.7 °C) (08/21/23 1100)  Pulse: 67 (08/21/23 1200)  Resp: 18 (08/21/23 1200)  BP: (!) 182/103 (08/21/23 1200)  SpO2: 95 % (08/21/23 1200) Vital Signs (24h Range):  Temp:  [95 °F (35 °C)-98.8 °F (37.1 °C)] 98 °F (36.7 °C)  Pulse:  [60-79] 67  Resp:  [12-38] 18  SpO2:  [89 %-99 %] 95 %  BP: (122-188)/() 182/103   Weight: 68.9 kg (151 lb 14.4 oz)  Body mass index is 22.43 kg/m².      Intake/Output Summary (Last 24 hours) at 8/21/2023 1216  Last data filed at 8/21/2023 0701  Gross per 24 hour   Intake 345.31 ml   Output 0 ml   Net 345.31 ml          Physical Exam  Vitals and nursing note reviewed.   Constitutional:       General: He is not in acute distress.     Appearance: He is not ill-appearing, toxic-appearing or diaphoretic.      Interventions: Nasal cannula in place.   HENT:      Head: Normocephalic and atraumatic.      Nose:      Comments: Dried blood at nares     Mouth/Throat:      Mouth: Mucous membranes are moist.   Eyes:      General: No scleral icterus.  Cardiovascular:      Rate and Rhythm:  Normal rate and regular rhythm.      Pulses: Normal pulses.   Pulmonary:      Effort: Pulmonary effort is normal. No respiratory distress.      Breath sounds: No wheezing or rales.   Abdominal:      General: There is distension.      Palpations: Abdomen is soft.      Tenderness: There is no abdominal tenderness. There is no guarding or rebound.   Musculoskeletal:      Right lower leg: Edema (trace) present.      Left lower leg: Edema (trace) present.   Skin:     General: Skin is warm and dry.      Coloration: Skin is not jaundiced.   Neurological:      Mental Status: He is alert and oriented to person, place, and time. Mental status is at baseline.   Psychiatric:         Mood and Affect: Mood normal.         Behavior: Behavior normal.            Vents:     Lines/Drains/Airways       Drain  Duration             Male External Urinary Catheter 08/20/23 0430 1 day              Peripheral Intravenous Line  Duration                  Hemodialysis AV Fistula Left upper arm -- days         Peripheral IV - Single Lumen 08/19/23 2131 22 G Posterior;Right Hand 1 day         Peripheral IV - Single Lumen 08/20/23 0230 20 G Anterior;Right Forearm 1 day                  Significant Labs: All pertinent labs within the past 24 hours have been reviewed.  LABS:  Recent Labs   Lab 08/21/23  0023 08/21/23  0402 08/21/23  0852   * 134* 133*   K 5.3* 5.3* 5.0   CL 94* 95 95   CO2 28 26 27   BUN 43* 46* 48*   CREATININE 4.6* 4.7* 4.7*   * 222* 166*   ANIONGAP 11 13 11     Recent Labs   Lab 08/20/23  0441 08/21/23  0402   MG 2.2 2.1   PHOS 3.2 3.6     Recent Labs   Lab 08/19/23  2204 08/20/23  0441 08/21/23  0402   AST 48* 34 26   ALT 36 28 16   ALKPHOS 188* 177* 154*   BILITOT 0.7 0.8 0.6   ALBUMIN 3.0* 3.1* 2.8*     POCT Glucose:   Recent Labs   Lab 08/21/23  0022 08/21/23  0556 08/21/23  1159   POCTGLUCOSE 195* 273* 130*    Recent Labs   Lab 08/19/23  2204 08/20/23  0411 08/20/23  0441 08/21/23  0402   WBC 3.49*  --  3.15*  4.45   HGB 8.7*  --  8.6* 8.7*   HCT 29.1* 31* 27.7* 27.0*   *  --  130* 156   GRAN 70.6  2.5  --  73.0  2.3 71.3  3.2        Micro  Blood Cultures  Lab Results   Component Value Date    LABBLOO No Growth after 4 days. 07/18/2023    LABBLOO No Growth after 4 days. 07/18/2023    LABBLOO No growth after 5 days. 06/21/2023    LABBLOO No growth after 5 days. 06/21/2023    LABBLOO No growth after 5 days. 03/10/2023    LABBLOO No growth after 5 days. 03/10/2023       Significant Imaging: I have reviewed all pertinent imaging results/findings within the past 24 hours.    US Extremity Non Vascular Limited Left   Final Result      As above.      Electronically signed by resident: Ray Renner   Date:    08/20/2023   Time:    09:34      Electronically signed by: Hal Garcia Jr   Date:    08/20/2023   Time:    09:41      X-Ray Chest AP Portable   Final Result      Please see above.         Electronically signed by: Yakelin Holm MD   Date:    08/19/2023   Time:    22:26          Inpatient Medications:  Continuous Infusions:   dextrose 5 % and 0.45 % NaCl Stopped (08/20/23 1216)     Scheduled Meds:   albuterol-ipratropium  3 mL Nebulization Q6H WAKE    apixaban  5 mg Oral BID    atorvastatin  40 mg Oral Daily    carvediloL  3.125 mg Oral BID    FLUoxetine  40 mg Oral Daily    fluticasone furoate-vilanteroL  1 puff Inhalation Daily    insulin aspart U-100  4 Units Subcutaneous TIDWM    insulin detemir U-100  7 Units Subcutaneous BID    lisinopriL  40 mg Oral QHS    mupirocin   Nasal BID    NIFEdipine  60 mg Oral BID    sevelamer carbonate  800 mg Oral TID    tiotropium bromide  2 puff Inhalation Daily     PRN Meds:acetaminophen, dextrose 10%, dextrose 10%, dextrose 10%, dextrose 10%, dextrose 10%, dextrose 10%, dextrose 5 % and 0.45 % NaCl, glucagon (human recombinant), hydrALAZINE, insulin aspart U-100, labetalol, melatonin, ondansetron, oxymetazoline, QUEtiapine, sodium chloride 0.9%, sodium chloride 0.9%

## 2023-08-21 NOTE — PROGRESS NOTES
[facility]  Nephrology  Progress Note    Patient Name: Cosme Lewis  MRN: [id number]  Admission Date: 8/19/2023  Hospital Length of Stay: 1 days  Attending Provider: Chente Newell MD   Primary Care Physician: Eliecer Ohara III, MD  Principal Problem:Bleeding from dialysis shunt    Subjective:     HPI: 57 y.o. male with CHF, ESRD on HD MWF, history of PE on eliquis, HTN, DM who presented to the hospital with a chief complaint of inability to dialyze. The patient has a LUE fistula that was noted to have some bleeding after dialysis on 8/16/23. He was unable to dialyze 8/18/23 secondary to bleeding/oozing with no intervention. He was sent to the ER for worsening shortness of breath and persistent bleeding at the AV fistula site.     In the ER he was noted to be hypoxemic, mildly hypercapnic with a slow bleed form the AV fistula at the previous areas of access. ER placed quick clot and pressure with eventual stopping of the bleed. Vascular surgery was consulted for fistula revision as he has developed significant pseudoaneurysm. Patient s/p fistulogram with vascular surgery on 8/20/23.      Nephrology was consulted for management of ESRD/HD.       Past Medical History:   Diagnosis Date    CHF (congestive heart failure)     Chronic kidney disease-mineral and bone disorder 10/12/2022    Chronic respiratory failure     COPD (chronic obstructive pulmonary disease)     Diabetes mellitus type 1     ESRD on dialysis     Hypertension     Hypervolemia 02/22/2023    Hyponatremia 03/04/2023    Other insomnia     Recurrent major depression     SOB (shortness of breath) 10/12/2022    Patient with history COPD treated with Ellipta the albuterol, fluticasone-salmeterol tachypneic in the ED saturating 94% and 6 L on nasal cannula, patient does not know how many  he uses it is in nursing home.    -duo nebs Q 4  Given that patient is a poor historian, and in the setting of left lower extremity edema and  history of coagulopathy PE cannot be ruled out.  Will workup for possible infec    Unspecified lack of coordination        Past Surgical History:   Procedure Laterality Date    AV FISTULA PLACEMENT      FISTULOGRAM Left 8/20/2023    Procedure: Fistulogram;  Surgeon: Duong Aceves MD;  Location: [facility] OR [address] [place] [address];  Service: Vascular;  Laterality: Left;  1.6 MIN  55.57 mGy  10.4137 Gycm2  24 ml dye  4 ml transradial flush    PHLEBOGRAPHY  8/20/2023    Procedure: VENOGRAM, CENTRAL;  Surgeon: Duong Aceves MD;  Location: [facility] OR [address] [place] [address];  Service: Vascular;;       Review of patient's allergies indicates:  No Known Allergies  Current Facility-Administered Medications   Medication Frequency    0.9%  NaCl infusion Once    acetaminophen tablet 650 mg Q4H PRN    albuterol-ipratropium 2.5 mg-0.5 mg/3 mL nebulizer solution 3 mL Q6H WAKE    apixaban tablet 5 mg BID    atorvastatin tablet 40 mg Daily    carvediloL tablet 3.125 mg BID    dextrose 10% bolus 125 mL 125 mL PRN    dextrose 10% bolus 125 mL 125 mL PRN    dextrose 10% bolus 125 mL 125 mL PRN    dextrose 10% bolus 250 mL 250 mL PRN    dextrose 10% bolus 250 mL 250 mL PRN    dextrose 10% bolus 250 mL 250 mL PRN    dextrose 5 % and 0.45 % NaCl infusion Continuous PRN    FLUoxetine capsule 40 mg Daily    fluticasone furoate-vilanteroL 100-25 mcg/dose diskus inhaler 1 puff Daily    glucagon (human recombinant) injection 1 mg PRN    hydrALAZINE tablet 25 mg Q8H PRN    insulin aspart U-100 pen 1-10 Units Q6H PRN    insulin aspart U-100 pen 4 Units TIDWM    insulin detemir U-100 (Levemir) pen 7 Units BID    labetalol 20 mg/4 mL (5 mg/mL) IV syring Q6H PRN    lisinopriL tablet 40 mg QHS    melatonin tablet 6 mg Nightly PRN    mupirocin 2 % ointment BID    NIFEdipine 24 hr tablet 60 mg BID    ondansetron injection 4 mg Q6H PRN    oxymetazoline 0.05 % nasal spray 2 spray BID PRN    QUEtiapine tablet 25 mg Nightly PRN    sevelamer  carbonate tablet 800 mg TID    sodium chloride 0.9% flush 10 mL PRN    sodium chloride 0.9% flush 10 mL PRN    tiotropium bromide 2.5 mcg/actuation inhaler 2 puff Daily     Family History       Problem Relation (Age of Onset)    Diabetes Mother          Tobacco Use    Smoking status: Never    Smokeless tobacco: Never   Substance and Sexual Activity    Alcohol use: Not Currently    Drug use: Not on file    Sexual activity: Not Currently     Review of Systems   Constitutional: Negative.    HENT: Negative.     Eyes: Negative.    Respiratory:  Positive for shortness of breath.    Cardiovascular: Negative.    Gastrointestinal: Negative.    Genitourinary:         Minimal to anuric at baseline    Musculoskeletal: Negative.    Skin: Negative.    Neurological: Negative.    Psychiatric/Behavioral: Negative.       Objective:     Vital Signs (Most Recent):  Temp: 98.6 °F (37 °C) (08/21/23 0900)  Pulse: 70 (08/21/23 1045)  Resp: (!) 24 (08/21/23 1045)  BP: (!) 164/95 (08/21/23 1045)  SpO2: 95 % (08/21/23 1045) Vital Signs (24h Range):  Temp:  [95 °F (35 °C)-98.8 °F (37.1 °C)] 98.6 °F (37 °C)  Pulse:  [60-79] 70  Resp:  [12-38] 24  SpO2:  [89 %-99 %] 95 %  BP: (122-165)/(67-95) 164/95     Weight: 68.9 kg (151 lb 14.4 oz) (08/20/23 0525)  Body mass index is 22.43 kg/m².  Body surface area is 1.83 meters squared.    I/O last 3 completed shifts:  In: 1709.3 [I.V.:1379.6; IV Piggyback:329.6]  Out: 0      Physical Exam  Constitutional:       Appearance: Normal appearance.   HENT:      Head: Normocephalic and atraumatic.      Nose: Nose normal.      Mouth/Throat:      Mouth: Mucous membranes are moist.      Pharynx: Oropharynx is clear.   Eyes:      Conjunctiva/sclera: Conjunctivae normal.   Cardiovascular:      Rate and Rhythm: Normal rate.      Comments: LUE AVF with +thrill and +bruit   Pulmonary:      Effort: Pulmonary effort is normal.   Abdominal:      General: There is distension.   Musculoskeletal:         General:  Normal range of motion.      Cervical back: Normal range of motion and neck supple.   Skin:     General: Skin is warm and dry.   Neurological:      General: No focal deficit present.      Mental Status: He is alert and oriented to person, place, and time.   Psychiatric:         Mood and Affect: Mood normal.         Behavior: Behavior normal.          Significant Labs:  CBC:   Recent Labs   Lab 08/21/23  0402   WBC 4.45   RBC 2.87*   HGB 8.7*   HCT 27.0*      MCV 94   MCH 30.3   MCHC 32.2     CMP:   Recent Labs   Lab 08/21/23  0402 08/21/23  0852   * 166*   CALCIUM 7.7* 8.0*   ALBUMIN 2.8*  --    PROT 5.9*  --    * 133*   K 5.3* 5.0   CO2 26 27   CL 95 95   BUN 46* 48*   CREATININE 4.7* 4.7*   ALKPHOS 154*  --    ALT 16  --    AST 26  --    BILITOT 0.6  --      All labs within the past 24 hours have been reviewed.        Assessment/Plan:     Cardiac/Vascular  * Bleeding from dialysis shunt  -Plan per vascular surgery     Renal/  ESRD on hemodialysis  Outpatient HD Information:    -Outpatient HD unit: [facility]   -HD tx days: MWF   -HD tx time: 4hrs   -Last HD tx prior to presentation: 8/16/23  -HD access: LUE AVF   -HD modality: iHD   -Residual urine: Minimal to anuric       Plan/Recommendations:    -HD today for metabolic clearance and volume management   -Renal diet  -Strict I/O's and daily weights  -Daily renal function panels   -Renally dose meds      Chronic kidney disease-mineral and bone disorder  -Renal diet   -Continue renvela     Oncology  Anemia in ESRD (end-stage renal disease)  -Target Hg of 10-12  -Ordered ferritin/iron studies         Thank you for your consult. I will follow-up with patient. Please contact us if you have any additional questions.    Abdulaziz Forbes NP  Nephrology  [facility]

## 2023-08-21 NOTE — ASSESSMENT & PLAN NOTE
Recent Labs     08/19/23  2204 08/20/23  0441 08/20/23  0804 08/20/23 2023 08/21/23  0023 08/21/23  0402   * 136   < > 135* 133* 134*   K 3.9 3.6   < > 5.0 5.3* 5.3*   CO2 25 26   < > 27 28 26   BUN 35* 38*   < > 40* 43* 46*   CREATININE 3.9* 4.0*   < > 4.2* 4.6* 4.7*   PHOS  --  3.2  --   --   --  3.6   CALCIUM 7.9* 7.7*   < > 7.9* 7.8* 7.7*   ALBUMIN 3.0* 3.1*  --   --   --  2.8*    < > = values in this interval not displayed.         Nephrology consulted for HD. Appreciate recs  Continue Sevelamer 800 TID

## 2023-08-21 NOTE — ASSESSMENT & PLAN NOTE
Hyperglycemic on admission requiring insulin infusion temporarily    Home regimen: No insulin listed on home meds      Last A1c:   Lab Results   Component Value Date    HGBA1C 10.7 (H) 07/18/2023      Recent Labs     08/20/23  1833 08/20/23  1835 08/20/23 2022 08/21/23  0022 08/21/23  0556 08/21/23  1159   POCTGLUCOSE 57* 64* 112* 195* 273* 130*         Plan:  Antihyperglycemics (From admission, onward)    Start     Stop Route Frequency Ordered    08/21/23 1130  insulin aspart U-100 pen 4 Units         -- SubQ 3 times daily with meals 08/21/23 0749    08/21/23 0900  insulin detemir U-100 (Levemir) pen 7 Units         -- SubQ 2 times daily 08/21/23 0750    08/20/23 1259  insulin aspart U-100 pen 1-10 Units         -- SubQ Every 6 hours PRN 08/20/23 1201      - Renal diet  - POCT glucose ACHS  - Will monitor glucose results and adjust insulin regimen accordingly

## 2023-08-21 NOTE — ASSESSMENT & PLAN NOTE
Outpatient HD Information:    -Outpatient HD unit: [facility]   -HD tx days: MWF   -HD tx time: 4hrs   -Last HD tx prior to presentation: 8/16/23  -HD access: LUE AVF   -HD modality: iHD   -Residual urine: Minimal to anuric       Plan/Recommendations:    -HD today for metabolic clearance and volume management   -Renal diet  -Strict I/O's and daily weights  -Daily renal function panels   -Renally dose meds

## 2023-08-21 NOTE — SUBJECTIVE & OBJECTIVE
Medications:  Continuous Infusions:   dextrose 5 % and 0.45 % NaCl Stopped (08/20/23 1216)    heparin (porcine) in D5W       Scheduled Meds:   atorvastatin  40 mg Oral Daily    carvediloL  6.25 mg Oral BID    FLUoxetine  40 mg Oral Daily    fluticasone furoate-vilanteroL  1 puff Inhalation Daily    insulin aspart U-100  4 Units Subcutaneous TIDWM    insulin detemir U-100  7 Units Subcutaneous BID    lisinopriL  40 mg Oral QHS    mupirocin   Nasal BID    NIFEdipine  60 mg Oral BID    sevelamer carbonate  800 mg Oral TID    tiotropium bromide  2 puff Inhalation Daily     PRN Meds:acetaminophen, dextrose 10%, dextrose 10%, dextrose 10%, dextrose 10%, dextrose 10%, dextrose 10%, dextrose 5 % and 0.45 % NaCl, glucagon (human recombinant), heparin (PORCINE), heparin (PORCINE), hydrALAZINE, insulin aspart U-100, labetalol, melatonin, ondansetron, oxymetazoline, QUEtiapine, sodium chloride 0.9%, sodium chloride 0.9%     No current facility-administered medications on file prior to encounter.     Current Outpatient Medications on File Prior to Encounter   Medication Sig    acetaminophen (TYLENOL) 650 MG TbSR Take 650 mg by mouth every 8 (eight) hours as needed (pain or fever over 100.4).    albuterol (PROVENTIL/VENTOLIN HFA) 90 mcg/actuation inhaler Inhale 2 puffs into the lungs 4 (four) times daily as needed (breathing).    albuterol-ipratropium (DUO-NEB) 2.5 mg-0.5 mg/3 mL nebulizer solution Take 3 mLs by nebulization every 4 (four) hours while awake. Rescue    apixaban (ELIQUIS) 5 mg Tab Take 5 mg by mouth 2 (two) times daily.    artificial tears (ISOPTO TEARS) 0.5 % ophthalmic solution Place 1 drop into both eyes 6 (six) times daily.    aspirin (ECOTRIN) 81 MG EC tablet Take 81 mg by mouth once daily.    atorvastatin (LIPITOR) 40 MG tablet Take 1 tablet (40 mg total) by mouth once daily. (Patient taking differently: Take 40 mg by mouth every evening.)    carvediloL (COREG) 6.25 MG tablet Take 3.125 mg by mouth 2 (two)  times daily.    cetirizine (ZYRTEC) 10 MG tablet Take 10 mg by mouth daily as needed (itching).    cloNIDine 0.3 mg/24 hr td ptwk (CATAPRES) 0.3 mg/24 hr APPLY ONE PATCH TOPICALLY EVERY WEEK FOR HIGH BLOOD PRESSURE    epoetin xavier (PROCRIT) 10,000 unit/mL injection Inject 0.7 mLs (7,000 Units total) into the skin every Tues, Thurs, Sat.    FLUoxetine 40 MG capsule Take 40 mg by mouth once daily.    fluticasone-salmeterol diskus inhaler 250-50 mcg Inhale 1 puff into the lungs 2 (two) times daily.    GLUCAGON EMERGENCY KIT, HUMAN, 1 mg injection 1 mg into the muscle as needed if CBG < 70    HYDROPHILIC CREAM TOP Apply liberal amount to affected area twice daily for dry skin    isosorbide mononitrate (IMDUR) 30 MG 24 hr tablet Take 1 tablet (30 mg total) by mouth once daily.    lisinopriL (PRINIVIL,ZESTRIL) 40 MG tablet Take 1 tablet (40 mg total) by mouth every evening.    melatonin 3 mg TbDL Take 9 mg by mouth nightly as needed (sleep).    NIFEdipine (PROCARDIA-XL) 60 MG (OSM) 24 hr tablet Take 1 tablet (60 mg total) by mouth 2 (two) times a day.    nut.tx.imp.renal fxn,lac-reduc (NEPRO CARB STEADY ORAL) Give 1 carton by mouth with each meal.    ondansetron (ZOFRAN) 8 MG tablet Take 1 tablet by mouth 3 (three) times daily as needed for Nausea.    sevelamer carbonate (RENVELA) 800 mg Tab Take 800 mg by mouth 3 (three) times daily.    sodium chloride (SALINE NASAL) 0.65 % nasal spray 1 spray by Nasal route as needed (dry nostril).    tiotropium bromide (SPIRIVA RESPIMAT) 2.5 mcg/actuation inhaler Inhale 2 puffs into the lungs Daily.    umeclidinium (INCRUSE ELLIPTA) 62.5 mcg/actuation inhalation capsule Inhale 62.5 mcg into the lungs once daily. Controller    vitamin renal formula, B-complex-vitamin c-folic acid, (NEPHROCAP) 1 mg Cap Take 1 capsule by mouth once daily.    white petrolatum (VASELINE) ointment Apply topically once daily. Apply small amount to both nostrils daily       Review of patient's allergies  indicates:  No Known Allergies    Past Medical History:   Diagnosis Date    CHF (congestive heart failure)     Chronic kidney disease-mineral and bone disorder 10/12/2022    Chronic respiratory failure     COPD (chronic obstructive pulmonary disease)     Diabetes mellitus type 1     ESRD on dialysis     Hypertension     Hypervolemia 02/22/2023    Hyponatremia 03/04/2023    Other insomnia     Recurrent major depression     SOB (shortness of breath) 10/12/2022    Patient with history COPD treated with Ellipta the albuterol, fluticasone-salmeterol tachypneic in the ED saturating 94% and 6 L on nasal cannula, patient does not know how many  he uses it is in nursing home.    -duo nebs Q 4  Given that patient is a poor historian, and in the setting of left lower extremity edema and history of coagulopathy PE cannot be ruled out.  Will workup for possible infec    Unspecified lack of coordination      Past Surgical History:   Procedure Laterality Date    AV FISTULA PLACEMENT      FISTULOGRAM Left 8/20/2023    Procedure: Fistulogram;  Surgeon: Duong Aceves MD;  Location: [facility] OR [address] [place] [address];  Service: Vascular;  Laterality: Left;  1.6 MIN  55.57 mGy  10.4137 Gycm2  24 ml dye  4 ml transradial flush    PHLEBOGRAPHY  8/20/2023    Procedure: VENOGRAM, CENTRAL;  Surgeon: Duong Aceves MD;  Location: [facility] OR [address] [place] [address];  Service: Vascular;;     Family History       Problem Relation (Age of Onset)    Diabetes Mother          Tobacco Use    Smoking status: Never    Smokeless tobacco: Never   Substance and Sexual Activity    Alcohol use: Not Currently    Drug use: Not on file    Sexual activity: Not Currently     Review of Systems  Objective:     Vital Signs (Most Recent):  Temp: 97.8 °F (36.6 °C) (08/21/23 1500)  Pulse: 66 (08/21/23 1700)  Resp: 20 (08/21/23 1700)  BP: (!) 198/88 (08/21/23 1700)  SpO2: (!) 94 % (08/21/23 1600) Vital Signs (24h Range):  Temp:  [97.8 °F (36.6 °C)-98.8 °F (37.1 °C)] 97.8 °F (36.6  "°C)  Pulse:  [64-79] 66  Resp:  [15-31] 20  SpO2:  [89 %-99 %] 94 %  BP: (122-208)/() 198/88     Weight: 68.9 kg (151 lb 14.4 oz)  Body mass index is 22.43 kg/m².    Date 08/21/23 0700 - 08/22/23 0659   Shift 2320-5260 7562-9438 2301-4244 24 Hour Total   INTAKE   Other 500   500   Shift Total(mL/kg) 500(7.3)   500(7.3)   OUTPUT   Urine(mL/kg/hr) 0(0)   0   Other 3500   3500   Shift Total(mL/kg) 3500(50.8)   3500(50.8)   Weight (kg) 68.9 68.9 68.9 68.9        Physical Exam     NAD  NC at 3L, not humidified.  Dry blood at anterior nares  Injected vessels L caudal septum, R anterior septum and bilateral inf turbs.   Trickle of Blood at posterior oropharynx.    Significant Labs:  CBC:   Recent Labs   Lab 08/21/23  1416   WBC 6.15   RBC 3.00*   HGB 9.0*   HCT 28.6*      MCV 95   MCH 30.0   MCHC 31.5*     CMP:   Recent Labs   Lab 08/21/23  0402 08/21/23  0852   * 166*   CALCIUM 7.7* 8.0*   ALBUMIN 2.8*  --    PROT 5.9*  --    * 133*   K 5.3* 5.0   CO2 26 27   CL 95 95   BUN 46* 48*   CREATININE 4.7* 4.7*   ALKPHOS 154*  --    ALT 16  --    AST 26  --    BILITOT 0.6  --      Coagulation:   Recent Labs   Lab 08/21/23  1416   LABPROT 14.1*   INR 1.3*   APTT 41.0*     ESR: No results for input(s): "ERYTHROCYTES" in the last 168 hours.    Significant Diagnostics:  None        Procedure: Control of epistaxis -- Nasal packing  After obtaining consent from the patient/family, anterior rhinoscopy performed. An active bleed was identified at the left caudal nasal septum.  A fibrillar nasal pack was placed to stop the bleed (absorbable). At the conclusion of the procedure, no active bleeding around the nasal pack was seen.      "

## 2023-08-21 NOTE — CONSULTS
[facility]  Nephrology  Consult Note    Patient Name: Cosem Lewis  MRN: [id number]  Admission Date: 8/19/2023  Hospital Length of Stay: 1 days  Attending Provider: Chente Newell MD   Primary Care Physician: Eliecer Ohara III, MD  Principal Problem:Bleeding from dialysis shunt    Inpatient consult to Nephrology  Consult performed by: Abdulaziz Forbes NP  Consult ordered by: Gary Howe DO  Reason for consult: ESRD/HD         Subjective:     HPI: 57 y.o. male with CHF, ESRD on HD MWF, history of PE on eliquis, HTN, DM who presented to the hospital with a chief complaint of inability to dialyze. The patient has a LUE fistula that was noted to have some bleeding after dialysis on 8/16/23. He was unable to dialyze 8/18/23 secondary to bleeding/oozing with no intervention. He was sent to the ER for worsening shortness of breath and persistent bleeding at the AV fistula site.     In the ER he was noted to be hypoxemic, mildly hypercapnic with a slow bleed form the AV fistula at the previous areas of access. ER placed quick clot and pressure with eventual stopping of the bleed. Vascular surgery was consulted for fistula revision as he has developed significant pseudoaneurysm. Patient s/p fistulogram with vascular surgery on 8/20/23.      Nephrology was consulted for management of ESRD/HD.       Past Medical History:   Diagnosis Date    CHF (congestive heart failure)     Chronic kidney disease-mineral and bone disorder 10/12/2022    Chronic respiratory failure     COPD (chronic obstructive pulmonary disease)     Diabetes mellitus type 1     ESRD on dialysis     Hypertension     Hypervolemia 02/22/2023    Hyponatremia 03/04/2023    Other insomnia     Recurrent major depression     SOB (shortness of breath) 10/12/2022    Patient with history COPD treated with Ellipta the albuterol, fluticasone-salmeterol tachypneic in the ED saturating 94% and 6 L on nasal cannula, patient does not know  how many  he uses it is in nursing home.    -duo nebs Q 4  Given that patient is a poor historian, and in the setting of left lower extremity edema and history of coagulopathy PE cannot be ruled out.  Will workup for possible infec    Unspecified lack of coordination        Past Surgical History:   Procedure Laterality Date    AV FISTULA PLACEMENT      FISTULOGRAM Left 8/20/2023    Procedure: Fistulogram;  Surgeon: Duong Aceves MD;  Location: [facility] OR [address] [place] [address];  Service: Vascular;  Laterality: Left;  1.6 MIN  55.57 mGy  10.4137 Gycm2  24 ml dye  4 ml transradial flush    PHLEBOGRAPHY  8/20/2023    Procedure: VENOGRAM, CENTRAL;  Surgeon: Duong Aceves MD;  Location: [facility] OR [address] [place] [address];  Service: Vascular;;       Review of patient's allergies indicates:  No Known Allergies  Current Facility-Administered Medications   Medication Frequency    0.9%  NaCl infusion Once    acetaminophen tablet 650 mg Q4H PRN    albuterol-ipratropium 2.5 mg-0.5 mg/3 mL nebulizer solution 3 mL Q6H WAKE    apixaban tablet 5 mg BID    atorvastatin tablet 40 mg Daily    carvediloL tablet 3.125 mg BID    dextrose 10% bolus 125 mL 125 mL PRN    dextrose 10% bolus 125 mL 125 mL PRN    dextrose 10% bolus 125 mL 125 mL PRN    dextrose 10% bolus 250 mL 250 mL PRN    dextrose 10% bolus 250 mL 250 mL PRN    dextrose 10% bolus 250 mL 250 mL PRN    dextrose 5 % and 0.45 % NaCl infusion Continuous PRN    FLUoxetine capsule 40 mg Daily    fluticasone furoate-vilanteroL 100-25 mcg/dose diskus inhaler 1 puff Daily    glucagon (human recombinant) injection 1 mg PRN    hydrALAZINE tablet 25 mg Q8H PRN    insulin aspart U-100 pen 1-10 Units Q6H PRN    insulin aspart U-100 pen 4 Units TIDWM    insulin detemir U-100 (Levemir) pen 7 Units BID    labetalol 20 mg/4 mL (5 mg/mL) IV syring Q6H PRN    lisinopriL tablet 40 mg QHS    melatonin tablet 6 mg Nightly PRN    mupirocin 2 % ointment BID    NIFEdipine 24 hr tablet 60 mg  BID    ondansetron injection 4 mg Q6H PRN    oxymetazoline 0.05 % nasal spray 2 spray BID PRN    QUEtiapine tablet 25 mg Nightly PRN    sevelamer carbonate tablet 800 mg TID    sodium chloride 0.9% flush 10 mL PRN    sodium chloride 0.9% flush 10 mL PRN    tiotropium bromide 2.5 mcg/actuation inhaler 2 puff Daily     Family History       Problem Relation (Age of Onset)    Diabetes Mother          Tobacco Use    Smoking status: Never    Smokeless tobacco: Never   Substance and Sexual Activity    Alcohol use: Not Currently    Drug use: Not on file    Sexual activity: Not Currently     Review of Systems   Constitutional: Negative.    HENT: Negative.     Eyes: Negative.    Respiratory:  Positive for shortness of breath.    Cardiovascular: Negative.    Gastrointestinal: Negative.    Genitourinary:         Minimal to anuric at baseline    Musculoskeletal: Negative.    Skin: Negative.    Neurological: Negative.    Psychiatric/Behavioral: Negative.       Objective:     Vital Signs (Most Recent):  Temp: 98.6 °F (37 °C) (08/21/23 0900)  Pulse: 70 (08/21/23 1045)  Resp: (!) 24 (08/21/23 1045)  BP: (!) 164/95 (08/21/23 1045)  SpO2: 95 % (08/21/23 1045) Vital Signs (24h Range):  Temp:  [95 °F (35 °C)-98.8 °F (37.1 °C)] 98.6 °F (37 °C)  Pulse:  [60-79] 70  Resp:  [12-38] 24  SpO2:  [89 %-99 %] 95 %  BP: (122-165)/(67-95) 164/95     Weight: 68.9 kg (151 lb 14.4 oz) (08/20/23 0525)  Body mass index is 22.43 kg/m².  Body surface area is 1.83 meters squared.    I/O last 3 completed shifts:  In: 1709.3 [I.V.:1379.6; IV Piggyback:329.6]  Out: 0      Physical Exam  Constitutional:       Appearance: Normal appearance.   HENT:      Head: Normocephalic and atraumatic.      Nose: Nose normal.      Mouth/Throat:      Mouth: Mucous membranes are moist.      Pharynx: Oropharynx is clear.   Eyes:      Conjunctiva/sclera: Conjunctivae normal.   Cardiovascular:      Rate and Rhythm: Normal rate.      Comments: LUE AVF with +thrill and  +bruit   Pulmonary:      Effort: Pulmonary effort is normal.   Abdominal:      General: There is distension.   Musculoskeletal:         General: Normal range of motion.      Cervical back: Normal range of motion and neck supple.   Skin:     General: Skin is warm and dry.   Neurological:      General: No focal deficit present.      Mental Status: He is alert and oriented to person, place, and time.   Psychiatric:         Mood and Affect: Mood normal.         Behavior: Behavior normal.          Significant Labs:  CBC:   Recent Labs   Lab 08/21/23  0402   WBC 4.45   RBC 2.87*   HGB 8.7*   HCT 27.0*      MCV 94   MCH 30.3   MCHC 32.2     CMP:   Recent Labs   Lab 08/21/23  0402 08/21/23  0852   * 166*   CALCIUM 7.7* 8.0*   ALBUMIN 2.8*  --    PROT 5.9*  --    * 133*   K 5.3* 5.0   CO2 26 27   CL 95 95   BUN 46* 48*   CREATININE 4.7* 4.7*   ALKPHOS 154*  --    ALT 16  --    AST 26  --    BILITOT 0.6  --      All labs within the past 24 hours have been reviewed.        Assessment/Plan:     Cardiac/Vascular  * Bleeding from dialysis shunt  -Plan per vascular surgery     Renal/  ESRD on hemodialysis  Outpatient HD Information:    -Outpatient HD unit: [facility]   -HD tx days: MWF   -HD tx time: 4hrs   -Last HD tx prior to presentation: 8/16/23  -HD access: LUE AVF   -HD modality: iHD   -Residual urine: Minimal to anuric       Plan/Recommendations:    -HD today for metabolic clearance and volume management   -Renal diet  -Strict I/O's and daily weights  -Daily renal function panels   -Renally dose meds      Chronic kidney disease-mineral and bone disorder  -Renal diet   -Continue renvela     Oncology  Anemia in ESRD (end-stage renal disease)  -Target Hg of 10-12  -Ordered ferritin/iron studies         Thank you for your consult. I will follow-up with patient. Please contact us if you have any additional questions.    Abdulaziz Forbes, NP  Nephrology  [facility]

## 2023-08-21 NOTE — ASSESSMENT & PLAN NOTE
-Patient's with Normocytic anemia..   -Hemoglobin stable.   -Etiology likely due to chronic disease .  -Current CBC reviewed-  Recent Labs   Lab 08/19/23  2204 08/20/23  0441 08/21/23  0402   HGB 8.7* 8.6* 8.7*       PLAN  - Monitor CBC and transfuse if Hgb < 7

## 2023-08-21 NOTE — PROGRESS NOTES
[facility]  Vascular Surgery  Progress Note    Patient Name: Cosme Lewis  MRN: [id number]  Admission Date: 8/19/2023  Primary Care Provider: Eliecer Ohara III, MD    Subjective:     Interval History: Patient feeling well this morning, good thrill and 2+ radial to LUE.     Post-Op Info:  Procedure(s) (LRB):  Fistulogram (Left)  VENOGRAM, CENTRAL   1 Day Post-Op       Medications:  Continuous Infusions:   dextrose 5 % and 0.45 % NaCl Stopped (08/20/23 1216)     Scheduled Meds:   albuterol-ipratropium  3 mL Nebulization Q6H WAKE    atorvastatin  40 mg Oral Daily    carvediloL  3.125 mg Oral BID    FLUoxetine  40 mg Oral Daily    fluticasone furoate-vilanteroL  1 puff Inhalation Daily    insulin aspart U-100  4 Units Subcutaneous TIDWM    insulin detemir U-100  7 Units Subcutaneous BID    lisinopriL  40 mg Oral QHS    mupirocin   Nasal BID    NIFEdipine  60 mg Oral BID    sevelamer carbonate  800 mg Oral TID    tiotropium bromide  2 puff Inhalation Daily     PRN Meds:acetaminophen, dextrose 10%, dextrose 10%, dextrose 10%, dextrose 10%, dextrose 10%, dextrose 10%, dextrose 5 % and 0.45 % NaCl, glucagon (human recombinant), hydrALAZINE, insulin aspart U-100, labetalol, melatonin, ondansetron, oxymetazoline, QUEtiapine, sodium chloride 0.9%, sodium chloride 0.9%     Objective:     Vital Signs (Most Recent):  Temp: 97.8 °F (36.6 °C) (08/21/23 0701)  Pulse: 79 (08/21/23 0727)  Resp: 19 (08/21/23 0727)  BP: (!) 163/86 (08/21/23 0701)  SpO2: (!) 94 % (08/21/23 0727) Vital Signs (24h Range):  Temp:  [95 °F (35 °C)-98.8 °F (37.1 °C)] 97.8 °F (36.6 °C)  Pulse:  [60-79] 79  Resp:  [12-38] 19  SpO2:  [89 %-99 %] 94 %  BP: (122-164)/(67-91) 163/86     Date 08/21/23 0700 - 08/22/23 0659   Shift 2888-03998 0504-4455 0050-0659 24 Hour Total   INTAKE   Shift Total(mL/kg)       OUTPUT   Urine(mL/kg/hr) 0   0   Shift Total(mL/kg) 0(0)   0(0)   Weight (kg) 68.9 68.9 68.9 68.9        Physical Exam  Vitals and nursing note  reviewed.   Constitutional:       General: He is not in acute distress.  HENT:      Head: Normocephalic.      Nose:      Comments: Blood present in bilateral nares     Mouth/Throat:      Mouth: Mucous membranes are moist.   Cardiovascular:      Rate and Rhythm: Normal rate and regular rhythm.      Comments: LUE AVG with palpable thrill  2+ radial pulse on left  Pulmonary:      Effort: Pulmonary effort is normal.   Musculoskeletal:         General: Normal range of motion.      Right lower leg: No edema.      Left lower leg: No edema.   Skin:     General: Skin is warm and dry.   Neurological:      Mental Status: He is alert and oriented to person, place, and time.          Significant Labs:  CBC:   Recent Labs   Lab 08/21/23  0402   WBC 4.45   RBC 2.87*   HGB 8.7*   HCT 27.0*      MCV 94   MCH 30.3   MCHC 32.2     CMP:   Recent Labs   Lab 08/21/23  0402   *   CALCIUM 7.7*   ALBUMIN 2.8*   PROT 5.9*   *   K 5.3*   CO2 26   CL 95   BUN 46*   CREATININE 4.7*   ALKPHOS 154*   ALT 16   AST 26   BILITOT 0.6       Significant Diagnostics:  I have reviewed all pertinent imaging results/findings within the past 24 hours.    Assessment/Plan:     * Bleeding from dialysis shunt  58 year old male with a PMH of chronic respiratory failure, CHF, HTN,  HLD, T2DM and ESRD on dialysis (MWF) who presents to the ED for bleeding fistula, SOB with new O2 requirements and nose bleeds. Bleeding controlled with manual pressure by ED staff. Compressive dressing placed.    Now s/p fistulogram on 7/20  Good thrill, flow through AVF  OK to use L AVF for HD  Rest of care to medicine for management of SOB and workup of nosebleeds  Vascular surgery will continue to follow peripherally, please reach out if any questions arise          Dispo:  Follow up to vascular clinic in one month     Nuria Bautista NP  Vascular Surgery  [facility]

## 2023-08-21 NOTE — PLAN OF CARE
MICU DAILY GOALS     Family/Goals of care/Code Status   Code Status: Full Code    24H Vital Sign Range  Temp:  [95 °F (35 °C)-98.1 °F (36.7 °C)]   Pulse:  [60-71]   Resp:  [12-38]   BP: (120-184)/(63-98)   SpO2:  [91 %-100 %]      Shift Events   No acute events throughout shift. Pt went for fistulogram today. Pt transitioned off insulin gtt around 11am, now having multiple episodes of hypoglycemia. CCT aware, multiple PRN d10 boluses given.     AWAKE RASS: Goal -    Actual -      Restraint necessity: Not necessary   BREATHE SBT: Not intubated    Coordinate A & B, analgesics/sedatives Pain: managed   SAT: Not intubated   Delirium CAM-ICU: Overall CAM-ICU: Negative   Early(intubated/ Progressive (non-intubated) Mobility MOVE Screen: Pass   Activity: Activity Management: Rolling - L1   Feeding/Nutrition Diet order: Diet/Nutrition Received: NPO,     Thrombus DVT prophylaxis: VTE Required Core Measure: Per order contraindicated for SCDs/Anticoagulants   HOB Elevation Head of Bed (HOB) Positioning: HOB elevated   Ulcer Prophylaxis GI: no   Glucose control managed Glycemic Management: blood glucose monitored   Skin Skin assessed during daily assessment. .   Bowel Function no issues    Indwelling Catheter Necessity          De-escalation Antibiotics No       VS and assessment per flow sheet, patient progressing towards goals as tolerated, plan of care reviewed with yonathan, all concerns addressed, will continue to monitor.

## 2023-08-21 NOTE — ASSESSMENT & PLAN NOTE
AVF bleeding controlled with manual pressure by ED staff. Compressive dressing placed.   -LUE U/S noted AV fistula was patent.   -Vascular surgery evaluation noted no flow limiting stenosis, abnormality in lumen of proximal fistula without flow limitation, large pseudoaneurysms to mid AVF.     PLAN:  -Nephrology consulted for dialysis.  - Resume eliquis and will evaluate aspirin resumption as tolerated(concerns for epistaxis)

## 2023-08-21 NOTE — CONSULTS
Nutrition consult received regarding Diabetic diet education - A1C 10.7 (7/18/2023).  Pt familiar to RD from previous encounters; educated on diabetic diet last month. Pt w/ history of non-compliance.    Additional diabetic paperwork left at bedside (pt resting this AM) w/ RD contact information.    Observed breakfast tray w/ 100% of meal consumed. UBW: 160#, per chart review.     Thanks!  MS Geena, RD, LDN

## 2023-08-21 NOTE — ASSESSMENT & PLAN NOTE
HFpEF - received 120 mg of lasix in the ED.   Echo    Result Date: 3/20/2023  · The left ventricle is normal in size with normal systolic function.  · The estimated ejection fraction is 55%.  · Grade II left ventricular diastolic dysfunction.  · Moderate right ventricular enlargement with moderately reduced right   ventricular systolic function.  · Biatrial enlargement.  · Interatrial septum bows to left, consider elevated right atrial   pressure.  · Mild tricuspid regurgitation.  · The estimated PA systolic pressure is 54 mmHg.  · There is pulmonary hypertension.  · Elevated central venous pressure (15 mmHg).  · Small circumferential pericardial effusion.  · There are bilateral pleural effusions.  · There is ascites present.      Will consult Nephrology for HD

## 2023-08-21 NOTE — NURSING
Arrived at bedside for 1:1 HD. Pt in NAD, VSS, AAOx4. Left upper arm AVF cannulated with 15g needles x2 using aseptic technique. Lines connected and secured- tx initiated at 0912.

## 2023-08-21 NOTE — HPI
57 y.o. male with CHF, ESRD on HD MWF, history of PE on eliquis, HTN, DM who presented to the hospital with a chief complaint of inability to dialyze. The patient has a LUE fistula that was noted to have some bleeding after dialysis on 8/16/23. He was unable to dialyze 8/18/23 secondary to bleeding/oozing with no intervention. He was sent to the ER for worsening shortness of breath and persistent bleeding at the AV fistula site.     In the ER he was noted to be hypoxemic, mildly hypercapnic with a slow bleed form the AV fistula at the previous areas of access. ER placed quick clot and pressure with eventual stopping of the bleed. Vascular surgery was consulted for fistula revision as he has developed significant pseudoaneurysm. Patient s/p fistulogram with vascular surgery on 8/20/23.      Nephrology was consulted for management of ESRD/HD.

## 2023-08-21 NOTE — NURSING
Pt completed 3.5 hours of HD. Tolerated well. VSS besides HTN. NAD. Net 3 liters fluid removed. Blood returned. Lines disconnected and flushed with NS. Needles pulled and manual pressure held until hemostasis achieved ~10 min per site. Report given to ADELITA Mccall.

## 2023-08-21 NOTE — ASSESSMENT & PLAN NOTE
"Hx of recurrent epistaxis    Differential diagnoses of etiology include, but not limited to: Anticoagulation use, home oxygen use, HTN    PLAN:  -Trial of Afrin nasal spray  PRN  - Resume eliquis and will evaluate aspirin resumption as tolerated(concerns for epistaxis)  -See "Hypertension" A&P    "

## 2023-08-21 NOTE — HOSPITAL COURSE
Admitted to MICU for concerns of AV fistula malfunction. Started on insulin drip temporarily for hyperglycemia and transitioned to scheduled insulin.  LUE U/S noted AV fistula was patent. Vascular surgery evaluation noted no flow limiting stenosis, abnormality in lumen of proximal fistula without flow limitation, large pseudoaneurysms to mid AVF. Nephrology consulted for dialysis. ENT evaluated persistent epistaxis with recommendations for  Afrin, Aquaphor, Facemask use and nasal precautions(see consult note dated 08/21/23). Consult also noted AAOHNS guideline discourage holding anticoagulation for epistaxis so Aspirin and Apixaban was resumed. Elevated BP so resumed home medications and increased Carvedilol dose. Pending stepdown to hospital medicine.

## 2023-08-21 NOTE — HPI
58M PMH CHF on 2 L home O2, ESRD, currently admitted for clot of AV fistula, on ASA, heparin, ENT consulted for persistent epistaxis. Now on 3L unhumidified nasal cannula. Patient has been having small volume bilateral nosebleeds for several months. Appears hemostatic now. On ASA and heparin.   Denies nausea, vomiting.

## 2023-08-22 NOTE — PROGRESS NOTES
[facility]  Nephrology  Progress Note    Patient Name: Cosme Lewis  MRN: [id number]  Admission Date: 8/19/2023  Hospital Length of Stay: 2 days  Attending Provider: Chente Newell MD   Primary Care Physician: Eliecer Ohara III, MD  Principal Problem:Bleeding from dialysis shunt      Interval History: Tolerated HD yesterday, with a net UF of 3L. Patient on 5L simple face mask this morning. Patient endorses SOB on exam.     Objective:     Vital Signs (Most Recent):  Temp: 98.6 °F (37 °C) (08/22/23 1100)  Pulse: 63 (08/22/23 1100)  Resp: 19 (08/22/23 1100)  BP: (!) 156/85 (08/22/23 1100)  SpO2: 98 % (08/22/23 1100) Vital Signs (24h Range):  Temp:  [97.8 °F (36.6 °C)-98.6 °F (37 °C)] 98.6 °F (37 °C)  Pulse:  [59-73] 63  Resp:  [16-36] 19  SpO2:  [88 %-100 %] 98 %  BP: (144-227)/() 156/85     Weight: 68.9 kg (151 lb 14.4 oz) (08/20/23 0525)  Body mass index is 22.43 kg/m².  Body surface area is 1.83 meters squared.    I/O last 3 completed shifts:  In: 1091 [I.V.:370.5; Other:500; IV Piggyback:220.5]  Out: 3500 [Other:3500]    Physical Exam  Constitutional:       Appearance: Normal appearance.   HENT:      Head: Normocephalic and atraumatic.      Nose: Nose normal.      Mouth/Throat:      Mouth: Mucous membranes are moist.      Pharynx: Oropharynx is clear.   Eyes:      Conjunctiva/sclera: Conjunctivae normal.   Cardiovascular:      Rate and Rhythm: Normal rate.      Comments: LUE AVF with +thrill and +bruit   Pulmonary:      Effort: Pulmonary effort is normal.   Abdominal:      General: There is distension.   Musculoskeletal:         General: Normal range of motion.      Cervical back: Normal range of motion and neck supple.   Skin:     General: Skin is warm and dry.   Neurological:      General: No focal deficit present.      Mental Status: He is alert and oriented to person, place, and time.   Psychiatric:         Mood and Affect: Mood normal.         Behavior: Behavior normal.           Significant Labs:  CBC:   Recent Labs   Lab 08/22/23  0430   WBC 4.44   RBC 2.98*   HGB 8.7*   HCT 28.7*      MCV 96   MCH 29.2   MCHC 30.3*       CMP:   Recent Labs   Lab 08/22/23  0430   *   CALCIUM 8.2*   ALBUMIN 2.9*   PROT 6.2   *   K 4.5   CO2 24      BUN 29*   CREATININE 3.7*   ALKPHOS 155*   ALT 8*   AST 20   BILITOT 0.7       All labs within the past 24 hours have been reviewed.        Assessment/Plan:     Cardiac/Vascular  * Bleeding from dialysis shunt  -Plan per vascular surgery     Renal/  ESRD on hemodialysis  Outpatient HD Information:    -Outpatient HD unit: [facility]   -HD tx days: MWF   -HD tx time: 4hrs   -Last HD tx prior to presentation: 8/16/23  -HD access: LUE AVF   -HD modality: iHD   -Residual urine: Minimal to anuric       Plan/Recommendations:    -HD today, primarily for further volume management  -Renal diet  -Strict I/O's and daily weights  -Daily renal function panels   -Renally dose meds      Chronic kidney disease-mineral and bone disorder  -Renal diet   -Continue renvela     Oncology  Anemia in ESRD (end-stage renal disease)  -Target Hg of 10-12  -Epo with HD         Thank you for your consult. I will follow-up with patient. Please contact us if you have any additional questions.    Abdulaziz Forbes NP  Nephrology  [facility]

## 2023-08-22 NOTE — CONSULTS
[facility]  Otorhinolaryngology-Head & Neck Surgery  Consult Note    Patient Name: Cosme Lewis  MRN: [id number]  Code Status: Full Code  Admission Date: 8/19/2023  Hospital Length of Stay: 1 days  Attending Physician: Chente Newell MD  Primary Care Provider: Eliecer Ohara III, MD    Patient information was obtained from past medical records and primary team.     Inpatient consult to ENT  Consult performed by: Deep Morrison MD  Consult ordered by: Gary Howe DO        Subjective:     Chief Complaint/Reason for Admission: AV fistula problem    History of Present Illness: 58M PMH CHF on 2 L home O2, ESRD, currently admitted for clot of AV fistula, on ASA, heparin, ENT consulted for persistent epistaxis. Now on 3L unhumidified nasal cannula. Patient has been having small volume bilateral nosebleeds for several months. Appears hemostatic now. On ASA and heparin.   Denies nausea, vomiting.       Medications:  Continuous Infusions:   dextrose 5 % and 0.45 % NaCl Stopped (08/20/23 1216)    heparin (porcine) in D5W       Scheduled Meds:   atorvastatin  40 mg Oral Daily    carvediloL  6.25 mg Oral BID    FLUoxetine  40 mg Oral Daily    fluticasone furoate-vilanteroL  1 puff Inhalation Daily    insulin aspart U-100  4 Units Subcutaneous TIDWM    insulin detemir U-100  7 Units Subcutaneous BID    lisinopriL  40 mg Oral QHS    mupirocin   Nasal BID    NIFEdipine  60 mg Oral BID    sevelamer carbonate  800 mg Oral TID    tiotropium bromide  2 puff Inhalation Daily     PRN Meds:acetaminophen, dextrose 10%, dextrose 10%, dextrose 10%, dextrose 10%, dextrose 10%, dextrose 10%, dextrose 5 % and 0.45 % NaCl, glucagon (human recombinant), heparin (PORCINE), heparin (PORCINE), hydrALAZINE, insulin aspart U-100, labetalol, melatonin, ondansetron, oxymetazoline, QUEtiapine, sodium chloride 0.9%, sodium chloride 0.9%     No current facility-administered medications on file prior to encounter.     Current  Outpatient Medications on File Prior to Encounter   Medication Sig    acetaminophen (TYLENOL) 650 MG TbSR Take 650 mg by mouth every 8 (eight) hours as needed (pain or fever over 100.4).    albuterol (PROVENTIL/VENTOLIN HFA) 90 mcg/actuation inhaler Inhale 2 puffs into the lungs 4 (four) times daily as needed (breathing).    albuterol-ipratropium (DUO-NEB) 2.5 mg-0.5 mg/3 mL nebulizer solution Take 3 mLs by nebulization every 4 (four) hours while awake. Rescue    apixaban (ELIQUIS) 5 mg Tab Take 5 mg by mouth 2 (two) times daily.    artificial tears (ISOPTO TEARS) 0.5 % ophthalmic solution Place 1 drop into both eyes 6 (six) times daily.    aspirin (ECOTRIN) 81 MG EC tablet Take 81 mg by mouth once daily.    atorvastatin (LIPITOR) 40 MG tablet Take 1 tablet (40 mg total) by mouth once daily. (Patient taking differently: Take 40 mg by mouth every evening.)    carvediloL (COREG) 6.25 MG tablet Take 3.125 mg by mouth 2 (two) times daily.    cetirizine (ZYRTEC) 10 MG tablet Take 10 mg by mouth daily as needed (itching).    cloNIDine 0.3 mg/24 hr td ptwk (CATAPRES) 0.3 mg/24 hr APPLY ONE PATCH TOPICALLY EVERY WEEK FOR HIGH BLOOD PRESSURE    epoetin xavier (PROCRIT) 10,000 unit/mL injection Inject 0.7 mLs (7,000 Units total) into the skin every Tues, Thurs, Sat.    FLUoxetine 40 MG capsule Take 40 mg by mouth once daily.    fluticasone-salmeterol diskus inhaler 250-50 mcg Inhale 1 puff into the lungs 2 (two) times daily.    GLUCAGON EMERGENCY KIT, HUMAN, 1 mg injection 1 mg into the muscle as needed if CBG < 70    HYDROPHILIC CREAM TOP Apply liberal amount to affected area twice daily for dry skin    isosorbide mononitrate (IMDUR) 30 MG 24 hr tablet Take 1 tablet (30 mg total) by mouth once daily.    lisinopriL (PRINIVIL,ZESTRIL) 40 MG tablet Take 1 tablet (40 mg total) by mouth every evening.    melatonin 3 mg TbDL Take 9 mg by mouth nightly as needed (sleep).    NIFEdipine (PROCARDIA-XL) 60 MG (OSM) 24 hr tablet Take 1  tablet (60 mg total) by mouth 2 (two) times a day.    nut.tx.imp.renal fxn,lac-reduc (NEPRO CARB STEADY ORAL) Give 1 carton by mouth with each meal.    ondansetron (ZOFRAN) 8 MG tablet Take 1 tablet by mouth 3 (three) times daily as needed for Nausea.    sevelamer carbonate (RENVELA) 800 mg Tab Take 800 mg by mouth 3 (three) times daily.    sodium chloride (SALINE NASAL) 0.65 % nasal spray 1 spray by Nasal route as needed (dry nostril).    tiotropium bromide (SPIRIVA RESPIMAT) 2.5 mcg/actuation inhaler Inhale 2 puffs into the lungs Daily.    umeclidinium (INCRUSE ELLIPTA) 62.5 mcg/actuation inhalation capsule Inhale 62.5 mcg into the lungs once daily. Controller    vitamin renal formula, B-complex-vitamin c-folic acid, (NEPHROCAP) 1 mg Cap Take 1 capsule by mouth once daily.    white petrolatum (VASELINE) ointment Apply topically once daily. Apply small amount to both nostrils daily       Review of patient's allergies indicates:  No Known Allergies    Past Medical History:   Diagnosis Date    CHF (congestive heart failure)     Chronic kidney disease-mineral and bone disorder 10/12/2022    Chronic respiratory failure     COPD (chronic obstructive pulmonary disease)     Diabetes mellitus type 1     ESRD on dialysis     Hypertension     Hypervolemia 02/22/2023    Hyponatremia 03/04/2023    Other insomnia     Recurrent major depression     SOB (shortness of breath) 10/12/2022    Patient with history COPD treated with Ellipta the albuterol, fluticasone-salmeterol tachypneic in the ED saturating 94% and 6 L on nasal cannula, patient does not know how many  he uses it is in nursing home.    -duo nebs Q 4  Given that patient is a poor historian, and in the setting of left lower extremity edema and history of coagulopathy PE cannot be ruled out.  Will workup for possible infec    Unspecified lack of coordination      Past Surgical History:   Procedure Laterality Date    AV FISTULA PLACEMENT      FISTULOGRAM Left 8/20/2023     Procedure: Fistulogram;  Surgeon: Duong Aceves MD;  Location: [facility] OR [address] [place] [address];  Service: Vascular;  Laterality: Left;  1.6 MIN  55.57 mGy  10.4137 Gycm2  24 ml dye  4 ml transradial flush    PHLEBOGRAPHY  8/20/2023    Procedure: VENOGRAM, CENTRAL;  Surgeon: Duong Aceves MD;  Location: [facility] OR [address] [place] [address];  Service: Vascular;;     Family History       Problem Relation (Age of Onset)    Diabetes Mother          Tobacco Use    Smoking status: Never    Smokeless tobacco: Never   Substance and Sexual Activity    Alcohol use: Not Currently    Drug use: Not on file    Sexual activity: Not Currently     Review of Systems  Objective:     Vital Signs (Most Recent):  Temp: 97.8 °F (36.6 °C) (08/21/23 1500)  Pulse: 66 (08/21/23 1700)  Resp: 20 (08/21/23 1700)  BP: (!) 198/88 (08/21/23 1700)  SpO2: (!) 94 % (08/21/23 1600) Vital Signs (24h Range):  Temp:  [97.8 °F (36.6 °C)-98.8 °F (37.1 °C)] 97.8 °F (36.6 °C)  Pulse:  [64-79] 66  Resp:  [15-31] 20  SpO2:  [89 %-99 %] 94 %  BP: (122-208)/() 198/88     Weight: 68.9 kg (151 lb 14.4 oz)  Body mass index is 22.43 kg/m².    Date 08/21/23 0700 - 08/22/23 0659   Shift 0622-3856 9984-0077 2504-8888 24 Hour Total   INTAKE   Other 500   500   Shift Total(mL/kg) 500(7.3)   500(7.3)   OUTPUT   Urine(mL/kg/hr) 0(0)   0   Other 3500   3500   Shift Total(mL/kg) 3500(50.8)   3500(50.8)   Weight (kg) 68.9 68.9 68.9 68.9        Physical Exam     NAD  NC at 3L, not humidified.  Dry blood at anterior nares  Injected vessels L caudal septum, R anterior septum and bilateral inf turbs.   Trickle of Blood at posterior oropharynx.    Significant Labs:  CBC:   Recent Labs   Lab 08/21/23  1416   WBC 6.15   RBC 3.00*   HGB 9.0*   HCT 28.6*      MCV 95   MCH 30.0   MCHC 31.5*     CMP:   Recent Labs   Lab 08/21/23  0402 08/21/23  0852   * 166*   CALCIUM 7.7* 8.0*   ALBUMIN 2.8*  --    PROT 5.9*  --    * 133*   K 5.3* 5.0   CO2 26 27   CL 95 95   BUN 46* 48*  "  CREATININE 4.7* 4.7*   ALKPHOS 154*  --    ALT 16  --    AST 26  --    BILITOT 0.6  --      Coagulation:   Recent Labs   Lab 08/21/23  1416   LABPROT 14.1*   INR 1.3*   APTT 41.0*     ESR: No results for input(s): "ERYTHROCYTES" in the last 168 hours.    Significant Diagnostics:  None        Procedure: Control of epistaxis -- Nasal packing  After obtaining consent from the patient/family, anterior rhinoscopy performed. An active bleed was identified at the left caudal nasal septum.  A fibrillar nasal pack was placed to stop the bleed (absorbable). At the conclusion of the procedure, no active bleeding around the nasal pack was seen.      Assessment/Plan:     Epistaxis  Cosme Lewis is a 58 y.o. male w/ PMH of COPD on home O2 presents with Hyperkalemia [E87.5];Bleeding [R58];Bleeding from dialysis shunt, initial encounter [T82.718A]  ENT consulted for epistaxis.       - Left nare absorbable packing will auto-dissolve. Do not need anti-staph antibiotics.  - Afrin to bilateral nares q8h for the next 48h  - Recommend humidified Oxygen if necessary, eg facemask. Avoid nasal cannula, as this causes trauma that leads to epistaxis.   - aquaphor to bilateral nares bid  - AAOHNS guideline discourages holding anticoagulation for epistaxis   - May place mustache dressing under nose for trickling, ok if dressing becomes saturated with some red/brown drainage  - Nasal saline q4h while packing in place; with nightly application of vaseline/aquaphor to anterior nares  - Keep head of bed elevated  - Apply Afrin to affected nasal cavity if epistaxis recurs, please see detailed instructions below   - Please call ENT with questions  - Remainder of care per primary team        PATIENT INSTRUCTIONS:    Nasal precautions  - DO NOT blow your nose for 2 weeks. The only exception is that you may lightly blow your nose after using a sinus rinse.  - Sneeze/cough with mouth open  - Avoid irritating substances that might make you sneeze, such as " dust, chalk, harsh chemicals, and allergic triggers. This might also include spicy foods.  - Do not smoke   - Avoid flying or swimming for 2 weeks.    - Avoid all heavy lifting, straining or bending for 2 weeks.   - Avoid semi-contact sports or vigorous exercising for 3-4 weeks.      Aftercare/ moisturizing recommendations to decrease frequency of nosebleeds:  - Daily nasal saline sprays/rinses  - Nightly application of vaseline/aquaphor to anterior nares  - Room humidification  - Avoidance of nasal cannula if possible (always with humidification and please use face tent, nasal cup, facemask if possible)  Management of medical conditions contributing to epistaxis (coagulopathy, hypertension, etc.)  - Humidification of CPAP appliance if applicable    If rebleeding occurs:   - Apply Afrin liberally to both nostrils  - Apply pressure over the soft part of the nose for 15 minutes (clip or digital pressure, important to have pressure over soft part of nose and consistent pressure (do not release pressure at any point))  - Instruct patient to lean forward, avoid swallowing blood. This can cause nausea and vomiting  - Can repeat these steps above up to 3 times  - Call/reconsult ENT or return to the nearest emergency department if this is unsuccessful          VTE Risk Mitigation (From admission, onward)           Ordered     heparin 25,000 units in dextrose 5% 250 mL (100 units/mL) infusion HIGH INTENSITY nomogram - [facility]  Continuous        Question Answer Comment   Heparin Infusion Adjustment (DO NOT MODIFY ANSWER) [email or web address]\[email or web address]\Images\Pharmacy\HeparinInfusions\heparin HIGH INTENSITY nomogram for [facility] [id number]    Begin at (in units/kg/hr) 18        08/21/23 1332     heparin 25,000 units in dextrose 5% (100 units/ml) IV bolus from bag - ADDITIONAL PRN BOLUS - 30 units/kg  As needed (PRN)        Question:  Heparin Infusion Adjustment (DO NOT MODIFY ANSWER)  Answer:   [email or web address]\[email or web address]\Images\Pharmacy\HeparinInfusions\heparin HIGH INTENSITY nomogram for [facility] [id number].pdf    08/21/23 1332     heparin 25,000 units in dextrose 5% (100 units/ml) IV bolus from bag - ADDITIONAL PRN BOLUS - 60 units/kg  As needed (PRN)        Question:  Heparin Infusion Adjustment (DO NOT MODIFY ANSWER)  Answer:  [email or web address]\epic\Images\Pharmacy\HeparinInfusions\heparin HIGH INTENSITY nomogram for [facility] [id number]    08/21/23 1332     IP VTE HIGH RISK PATIENT  Once         08/20/23 0036     Place sequential compression device  Until discontinued         08/20/23 0030                    Thank you for your consult. I will sign off. Please contact us if you have any additional questions.    Deep Morrison MD  Otorhinolaryngology-Head & Neck Surgery  [facility]

## 2023-08-22 NOTE — PLAN OF CARE
MICU DAILY GOALS     Family/Goals of care/Code Status   Code Status: Full Code    24H Vital Sign Range  Temp:  [97.8 °F (36.6 °C)-98.8 °F (37.1 °C)]   Pulse:  [64-79]   Resp:  [16-31]   BP: (122-208)/()   SpO2:  [89 %-99 %]      Shift Events  Pt completed Dialysis this afternoon; BP remains elevated after receiving all BP meds; Hydralazine po/IV given as per MD order; Nose cont' oozing blood throughout day; ENT consulted and applied packing; see progress note; Stepdown orders received.    AWAKE RASS: Goal - RASS Goal: 0-->alert and calm  Actual - RASS (Osorio Agitation-Sedation Scale): restless    Restraint necessity: Not necessary   BREATHE SBT: Not intubated    Coordinate A & B, analgesics/sedatives Pain: managed   SAT: Not intubated   Delirium CAM-ICU: Overall CAM-ICU: Negative   Early(intubated/ Progressive (non-intubated) Mobility MOVE Screen: Pass   Activity: Activity Management: Rolling - L1   Feeding/Nutrition Diet order: Diet/Nutrition Received: consistent carb/diabetic diet, Specialty Diet/Nutrition Received: renal diet   Thrombus DVT prophylaxis: VTE Required Core Measure: Per order contraindicated for SCDs/Anticoagulants   HOB Elevation Head of Bed (HOB) Positioning: HOB at 30-45 degrees   Ulcer Prophylaxis GI: yes   Glucose control managed Glycemic Management: blood glucose monitored   Skin Skin assessed during daily assessment. No.   Bowel Function no issues    Indwelling Catheter Necessity         No   De-escalation Antibiotics No       VS and assessment per flow sheet, patient progressing towards goals as tolerated, plan of care reviewed with patient, all concerns addressed, will continue to monitor.

## 2023-08-22 NOTE — PROGRESS NOTES
[facility] - Cardiac Medical ICU  Critical Care Medicine  Progress Note    Patient Name: Cosme Lewis  MRN: [id number]  Admission Date: 8/19/2023  Hospital Length of Stay: 2 days  Code Status: Full Code  Attending Provider: Chente Newell MD  Primary Care Provider: Eliecer Ohara III, MD   Principal Problem: Bleeding from dialysis shunt    Subjective:     HPI:  57 y.o. male with CHF, ESRD on HD MWF, history of PE on eliquis, HTN, DM presents to the hospital with a chief complaint of inability to dialyze. The patient has a LUE fistula that was noted to have some bleeding after dialysis on Wednesday of this week. He was unable to dialyze Friday secondary to bleeding/oozing with no intervention. He was sent to the ER this evening for worsening shortness of breath and persistent bleeding at the AV fistula site.    In the ER he was noted to be hypoxemic, mildly hypercapnic with a slow bleed form the AV fistula at the previous areas of access. ER placed quick clot and pressure with eventual stopping of the bleed. Vascular surgery was consulted who plans to take him to the OR in the morning for fistula revision as he has developed significant pseudoaneurysm. He is currently on 10L NC saturating well without an immediate need for urgent dialysis (seen by nephrology in the ER as well).    ICU team has been consulted for acute hypoxemic respiratory failure in the setting of severe volume overload and inability to dialyze through his current access.       Hospital/ICU Course:  Admitted to MICU for concerns of AV fistula malfunction. Started on insulin drip temporarily for hyperglycemia and transitioned to scheduled insulin.  LUE U/S noted AV fistula was patent. Vascular surgery evaluation noted no flow limiting stenosis, abnormality in lumen of proximal fistula without flow limitation, large pseudoaneurysms to mid AVF. Nephrology consulted for dialysis. ENT evaluated persistent epistaxis with recommendations for  Afrin, Aquaphor,  Facemask use and nasal precautions(see consult note dated 08/21/23). Consult also noted [facility] guideline discourage holding anticoagulation for epistaxis so Aspirin and Apixaban was resumed.       Interval History/Significant Events: No acute events overnight, afebrile. Elevated BP overnight improved after clonidine and nifedipine administration.       Review of Systems   Constitutional:  Negative for chills and fever.   HENT:  Negative for congestion, nosebleeds and rhinorrhea.    Respiratory:  Positive for shortness of breath. Negative for cough.    Cardiovascular:  Negative for chest pain and leg swelling.   Gastrointestinal:  Negative for constipation, diarrhea, nausea and vomiting.   Genitourinary:  Positive for difficulty urinating (ESRD on HD). Negative for dysuria.   Musculoskeletal:  Negative for arthralgias, back pain and myalgias.   Neurological:  Negative for dizziness and headaches.   Psychiatric/Behavioral:  Negative for sleep disturbance.      Objective:     Vital Signs (Most Recent):  Temp: 98.6 °F (37 °C) (08/22/23 1100)  Pulse: 63 (08/22/23 1100)  Resp: 19 (08/22/23 1100)  BP: (!) 156/85 (08/22/23 1100)  SpO2: 98 % (08/22/23 1100) Vital Signs (24h Range):  Temp:  [97.8 °F (36.6 °C)-98.6 °F (37 °C)] 98.6 °F (37 °C)  Pulse:  [59-71] 63  Resp:  [16-36] 19  SpO2:  [88 %-100 %] 98 %  BP: (144-227)/(76-99) 156/85   Weight: 68.9 kg (151 lb 14.4 oz)  Body mass index is 22.43 kg/m².      Intake/Output Summary (Last 24 hours) at 8/22/2023 1250  Last data filed at 8/22/2023 0500  Gross per 24 hour   Intake 1091 ml   Output 3500 ml   Net -2409 ml          Physical Exam  Vitals and nursing note reviewed.   Constitutional:       General: He is not in acute distress.     Appearance: He is not ill-appearing, toxic-appearing or diaphoretic.      Interventions: Face mask in place.   HENT:      Head: Normocephalic and atraumatic.      Nose:      Comments: Dried blood at nares     Mouth/Throat:      Mouth: Mucous  membranes are moist.   Eyes:      General: No scleral icterus.  Cardiovascular:      Rate and Rhythm: Normal rate and regular rhythm.      Pulses: Normal pulses.   Pulmonary:      Effort: Pulmonary effort is normal. No respiratory distress.      Breath sounds: No wheezing or rales.   Abdominal:      General: There is distension.      Palpations: Abdomen is soft.      Tenderness: There is no abdominal tenderness. There is no guarding or rebound.   Musculoskeletal:      Right lower leg: No edema.      Left lower leg: No edema.   Skin:     General: Skin is warm and dry.      Coloration: Skin is not jaundiced.   Neurological:      Mental Status: He is alert and oriented to person, place, and time. Mental status is at baseline.   Psychiatric:         Mood and Affect: Mood normal.         Behavior: Behavior normal.            Vents:     Lines/Drains/Airways       Peripheral Intravenous Line  Duration                  Hemodialysis AV Fistula Left upper arm -- days         Peripheral IV - Single Lumen 08/19/23 2131 22 G Posterior;Right Hand 2 days         Peripheral IV - Single Lumen 08/20/23 0230 20 G Anterior;Right Forearm 2 days                  Significant Labs:    CBC/Anemia Profile:  Recent Labs   Lab 08/21/23  0402 08/21/23  1416 08/22/23  0430   WBC 4.45 6.15 4.44   HGB 8.7* 9.0* 8.7*   HCT 27.0* 28.6* 28.7*    154 164   MCV 94 95 96   RDW 16.1* 16.4* 16.1*   IRON  --   --  48   FERRITIN  --   --  1,455*        Chemistries:  Recent Labs   Lab 08/21/23  0402 08/21/23  0852 08/22/23  0430   * 133* 135*   K 5.3* 5.0 4.5   CL 95 95 102   CO2 26 27 24   BUN 46* 48* 29*   CREATININE 4.7* 4.7* 3.7*   CALCIUM 7.7* 8.0* 8.2*   ALBUMIN 2.8*  --  2.9*   PROT 5.9*  --  6.2   BILITOT 0.6  --  0.7   ALKPHOS 154*  --  155*   ALT 16  --  8*   AST 26  --  20   MG 2.1  --  2.1   PHOS 3.6  --  3.3       All pertinent labs within the past 24 hours have been reviewed.    Significant Imaging:  I have reviewed all pertinent  imaging results/findings within the past 24 hours.      ABG  Recent Labs   Lab 08/19/23  2347   PH 7.412   PO2 74*   PCO2 47.4*   HCO3 30.2*   BE 6     Assessment/Plan:     ENT  Epistaxis  Hx of recurrent epistaxis    Differential diagnoses of etiology include, but not limited to: Anticoagulation use, home oxygen use, HTN    PLAN:   ENT evaluated persistent epistaxis with recommendations for  Afrin, Aquaphor, Facemask use and nasal precautions(see consult note dated 08/21/23). Consult also noted [facility] guideline discourage holding anticoagulation for epistaxis so Aspirin and Apixaban was resumed.     Pulmonary  Acute on chronic respiratory failure with hypoxia and hypercapnia  Patient with Hypoxic Respiratory failure which is Acute on chronic.  he is on home oxygen at 2LPM.   .   Signs/symptoms of respiratory failure include- respiratory distress. Contributing diagnoses includes - CHF and COPD Labs and images were reviewed. Patient Has not had a recent ABG. Will treat underlying causes and adjust management of respiratory failure as follows- Wean supplemental oxygen as tolerated for SaO2 goal >90% and HD for volume control    COPD (chronic obstructive pulmonary disease)  Reported h/o COPD. Continue supplemental oxygen.    Please see hypoxemic respiratory failure    Cardiac/Vascular  * Bleeding from dialysis shunt  AVF bleeding controlled with manual pressure by ED staff. Compressive dressing placed.   -LUE U/S noted AV fistula was patent.   -Vascular surgery evaluation noted no flow limiting stenosis, abnormality in lumen of proximal fistula without flow limitation, large pseudoaneurysms to mid AVF.     PLAN:  -Nephrology consulted for dialysis.  - Resume eliquis and will evaluate aspirin resumption as tolerated(concerns for epistaxis)    Hypertension  Continue patient on home Carvedilol, Lisinopril, Nifedipine and Clonidine        HLD (hyperlipidemia)  Continue home statin    Chronic diastolic heart failure  HFpEF  "- received 120 mg of lasix in the ED.   Echo    Result Date: 3/20/2023  · The left ventricle is normal in size with normal systolic function.  · The estimated ejection fraction is 55%.  · Grade II left ventricular diastolic dysfunction.  · Moderate right ventricular enlargement with moderately reduced right   ventricular systolic function.  · Biatrial enlargement.  · Interatrial septum bows to left, consider elevated right atrial   pressure.  · Mild tricuspid regurgitation.  · The estimated PA systolic pressure is 54 mmHg.  · There is pulmonary hypertension.  · Elevated central venous pressure (15 mmHg).  · Small circumferential pericardial effusion.  · There are bilateral pleural effusions.  · There is ascites present.      Will consult Nephrology for HD      Renal/  ESRD on hemodialysis  See "ESRD (end stage renal disease)" A&P      Chronic kidney disease-mineral and bone disorder  See "ESRD (end stage renal disease)" A&P      ESRD (end stage renal disease)  Recent Labs     08/20/23  0441 08/20/23  0804 08/21/23  0402 08/21/23  0852 08/22/23  0430      < > 134* 133* 135*   K 3.6   < > 5.3* 5.0 4.5   CO2 26   < > 26 27 24   BUN 38*   < > 46* 48* 29*   CREATININE 4.0*   < > 4.7* 4.7* 3.7*   PHOS 3.2  --  3.6  --  3.3   CALCIUM 7.7*   < > 7.7* 8.0* 8.2*   ALBUMIN 3.1*  --  2.8*  --  2.9*    < > = values in this interval not displayed.         Nephrology consulted for HD. Appreciate recs  Continue Sevelamer 800 TID    Oncology  Anemia in ESRD (end-stage renal disease)  -Patient's with Normocytic anemia..   -Hemoglobin stable.   -Etiology likely due to chronic disease .  -Current CBC reviewed-    Recent Labs   Lab 08/21/23  0402 08/21/23  1416 08/22/23  0430   HGB 8.7* 9.0* 8.7*       PLAN  - Monitor CBC and transfuse if Hgb < 7     Endocrine  Hyperglycemia due to type 2 diabetes mellitus  Hyperglycemic on admission requiring insulin infusion temporarily    Home regimen: No insulin listed on home meds      Last " A1c:   Lab Results   Component Value Date    HGBA1C 10.7 (H) 07/18/2023      Recent Labs     08/21/23  1159 08/21/23  1622 08/21/23  2214 08/22/23  0019 08/22/23  0738 08/22/23  1126   POCTGLUCOSE 130* 137* 275* 256* 95 84         Plan:  Antihyperglycemics (From admission, onward)    Start     Stop Route Frequency Ordered    08/22/23 2100  insulin detemir U-100 (Levemir) pen 5 Units         -- SubQ 2 times daily 08/22/23 0953    08/22/23 1051  insulin aspart U-100 pen 1-10 Units         -- SubQ Before meals & nightly PRN 08/22/23 0953    08/21/23 1130  insulin aspart U-100 pen 4 Units         -- SubQ 3 times daily with meals 08/21/23 0749      - Renal diet  - POCT glucose ACHS  - Will monitor glucose results and adjust insulin regimen accordingly     Critical Care Daily Checklist:    A: Awake: RASS Goal/Actual Goal: RASS Goal: 0-->alert and calm  Actual:     B: Spontaneous Breathing Trial Performed?  NA   C: SAT & SBT Coordinated?  NA                      D: Delirium: CAM-ICU Overall CAM-ICU: Negative   E: Early Mobility Performed? Yes   F: Feeding Goal:    Status:     Current Diet Order   Procedures    Diet renal      AS: Analgesia/Sedation NA   T: Thromboembolic Prophylaxis Eliquis   H: HOB > 300 Yes   U: Stress Ulcer Prophylaxis (if needed) NA   G: Glucose Control Scheduled insulin   B: Bowel Function Stool Occurrence: 1   I: Indwelling Catheter (Lines & Dutta) Necessity PIV, AVF   D: De-escalation of Antimicrobials/Pharmacotherapies NA    Plan for the day/ETD Stepdown to hospital medicine    Code Status:  Family/Goals of Care: Full Code         Critical secondary to Patient has a condition that poses threat to life and bodily function: ESRD with AV fistula concerns(Resolved, now pending stepdown)     Critical care was time spent personally by me on the following activities: development of treatment plan with patient or surrogate and bedside caregivers, discussions with consultants, evaluation of patient's  response to treatment, examination of patient, ordering and performing treatments and interventions, ordering and review of laboratory studies, ordering and review of radiographic studies, pulse oximetry, re-evaluation of patient's condition. This critical care time did not overlap with that of any other provider or involve time for any procedures.     Gary Howe,   Critical Care Medicine  [facility] - Cardiac Medical ICU

## 2023-08-22 NOTE — PLAN OF CARE
MICU DAILY GOALS     Family/Goals of care/Code Status   Code Status: Full Code    24H Vital Sign Range  Temp:  [97.8 °F (36.6 °C)-98.6 °F (37 °C)]   Pulse:  [59-79]   Resp:  [16-36]   BP: (130-227)/()   SpO2:  [88 %-100 %]      Shift Events   No acute events throughout shift    AWAKE RASS: Goal - RASS Goal: 0-->alert and calm  Actual - RASS (Osorio Agitation-Sedation Scale): alert and calm    Restraint necessity: Not necessary   BREATHE SBT: Not intubated    Coordinate A & B, analgesics/sedatives Pain: managed   SAT: Not intubated   Delirium CAM-ICU: Overall CAM-ICU: Negative   Early(intubated/ Progressive (non-intubated) Mobility MOVE Screen: Pass   Activity: Activity Management: Rolling - L1   Feeding/Nutrition Diet order: Diet/Nutrition Received: consistent carb/diabetic diet, Specialty Diet/Nutrition Received: renal diet   Thrombus DVT prophylaxis: VTE Required Core Measure: Pharmacological prophylaxis initiated/maintained   HOB Elevation Head of Bed (HOB) Positioning: HOB at 30-45 degrees   Ulcer Prophylaxis GI: yes   Glucose control managed Glycemic Management: blood glucose monitored   Skin Skin assessed during daily assessment. LDA present.   Bowel Function no issues    Indwelling Catheter Necessity         N/A   De-escalation Antibiotics Yes       VS and assessment per flow sheet, patient progressing towards goals as tolerated, plan of care reviewed with Mr. Lewis, all concerns addressed, will continue to monitor.

## 2023-08-22 NOTE — SUBJECTIVE & OBJECTIVE
Interval History/Significant Events: No acute events overnight, afebrile. Elevated BP overnight improved after clonidine and nifedipine administration.       Review of Systems   Constitutional:  Negative for chills and fever.   HENT:  Negative for congestion, nosebleeds and rhinorrhea.    Respiratory:  Positive for shortness of breath. Negative for cough.    Cardiovascular:  Negative for chest pain and leg swelling.   Gastrointestinal:  Negative for constipation, diarrhea, nausea and vomiting.   Genitourinary:  Positive for difficulty urinating (ESRD on HD). Negative for dysuria.   Musculoskeletal:  Negative for arthralgias, back pain and myalgias.   Neurological:  Negative for dizziness and headaches.   Psychiatric/Behavioral:  Negative for sleep disturbance.      Objective:     Vital Signs (Most Recent):  Temp: 98.6 °F (37 °C) (08/22/23 1100)  Pulse: 63 (08/22/23 1100)  Resp: 19 (08/22/23 1100)  BP: (!) 156/85 (08/22/23 1100)  SpO2: 98 % (08/22/23 1100) Vital Signs (24h Range):  Temp:  [97.8 °F (36.6 °C)-98.6 °F (37 °C)] 98.6 °F (37 °C)  Pulse:  [59-71] 63  Resp:  [16-36] 19  SpO2:  [88 %-100 %] 98 %  BP: (144-227)/(76-99) 156/85   Weight: 68.9 kg (151 lb 14.4 oz)  Body mass index is 22.43 kg/m².      Intake/Output Summary (Last 24 hours) at 8/22/2023 1250  Last data filed at 8/22/2023 0500  Gross per 24 hour   Intake 1091 ml   Output 3500 ml   Net -2409 ml          Physical Exam  Vitals and nursing note reviewed.   Constitutional:       General: He is not in acute distress.     Appearance: He is not ill-appearing, toxic-appearing or diaphoretic.      Interventions: Face mask in place.   HENT:      Head: Normocephalic and atraumatic.      Nose:      Comments: Dried blood at nares     Mouth/Throat:      Mouth: Mucous membranes are moist.   Eyes:      General: No scleral icterus.  Cardiovascular:      Rate and Rhythm: Normal rate and regular rhythm.      Pulses: Normal pulses.   Pulmonary:      Effort: Pulmonary  effort is normal. No respiratory distress.      Breath sounds: No wheezing or rales.   Abdominal:      General: There is distension.      Palpations: Abdomen is soft.      Tenderness: There is no abdominal tenderness. There is no guarding or rebound.   Musculoskeletal:      Right lower leg: No edema.      Left lower leg: No edema.   Skin:     General: Skin is warm and dry.      Coloration: Skin is not jaundiced.   Neurological:      Mental Status: He is alert and oriented to person, place, and time. Mental status is at baseline.   Psychiatric:         Mood and Affect: Mood normal.         Behavior: Behavior normal.            Vents:     Lines/Drains/Airways       Peripheral Intravenous Line  Duration                  Hemodialysis AV Fistula Left upper arm -- days         Peripheral IV - Single Lumen 08/19/23 2131 22 G Posterior;Right Hand 2 days         Peripheral IV - Single Lumen 08/20/23 0230 20 G Anterior;Right Forearm 2 days                  Significant Labs:    CBC/Anemia Profile:  Recent Labs   Lab 08/21/23  0402 08/21/23  1416 08/22/23  0430   WBC 4.45 6.15 4.44   HGB 8.7* 9.0* 8.7*   HCT 27.0* 28.6* 28.7*    154 164   MCV 94 95 96   RDW 16.1* 16.4* 16.1*   IRON  --   --  48   FERRITIN  --   --  1,455*        Chemistries:  Recent Labs   Lab 08/21/23  0402 08/21/23  0852 08/22/23  0430   * 133* 135*   K 5.3* 5.0 4.5   CL 95 95 102   CO2 26 27 24   BUN 46* 48* 29*   CREATININE 4.7* 4.7* 3.7*   CALCIUM 7.7* 8.0* 8.2*   ALBUMIN 2.8*  --  2.9*   PROT 5.9*  --  6.2   BILITOT 0.6  --  0.7   ALKPHOS 154*  --  155*   ALT 16  --  8*   AST 26  --  20   MG 2.1  --  2.1   PHOS 3.6  --  3.3       All pertinent labs within the past 24 hours have been reviewed.    Significant Imaging:  I have reviewed all pertinent imaging results/findings within the past 24 hours.

## 2023-08-22 NOTE — ASSESSMENT & PLAN NOTE
Outpatient HD Information:    -Outpatient HD unit: [facility]   -HD tx days: MWF   -HD tx time: 4hrs   -Last HD tx prior to presentation: 8/16/23  -HD access: LUE AVF   -HD modality: iHD   -Residual urine: Minimal to anuric       Plan/Recommendations:    -HD today, primarily for further volume management  -Renal diet  -Strict I/O's and daily weights  -Daily renal function panels   -Renally dose meds

## 2023-08-22 NOTE — PLAN OF CARE
[facility] - Cardiac Medical ICU  Initial Discharge Assessment       Primary Care Provider: Eliecer Ohara III, MD    Admission Diagnosis: Hyperkalemia [E87.5]  Bleeding [R58]  Bleeding from dialysis shunt, initial encounter [T82.837V]    Admission Date: 8/19/2023  Expected Discharge Date: 8/25/2023    Transition of Care Barriers: None    Payor: MEDICARE / Plan: MEDICARE PART A & B / Product Type: Government /     Extended Emergency Contact Information  Primary Emergency Contact: Kassandra Leon  Mobile Phone: [phone]  Relation: Mother    Discharge Plan A: Return to [facility] ([facility])  Discharge Plan B: Skilled Nursing Facility      [facility]  [address]  [facility] [address]  Phone: [phone] Fax: [phone]      Initial Assessment (most recent)       Adult Discharge Assessment - 08/22/23 1000          Discharge Assessment    Assessment Type Discharge Planning Assessment     Confirmed/corrected address, phone number and insurance Yes     Confirmed Demographics Correct on Facesheet     Source of Information health care advocate   nurse Rolo    When was your last doctors appointment? --   n/a    Does patient/caregiver understand observation status No     Was observation education provided? --   n/a    Communicated GERA with patient/caregiver Date not available/Unable to determine     Reason For Admission Bleeding from AV Fistular     People in Home facility resident     Facility Arrived From: [facility]     Do you expect to return to your current living situation? Yes     Do you have help at home or someone to help you manage your care at home? Yes     Who are your caregiver(s) and their phone number(s)? Nursing home staff/[phone]     Prior to hospitilization cognitive status: Unable to Assess     Current cognitive status: Unable to Assess     Walking or Climbing Stairs ambulation difficulty, dependent     Mobility Management wheelchair      Dressing/Bathing dressing difficulty, dependent;bathing difficulty, dependent     Dressing/Bathing Management patient is dependent with ADL.     Home Accessibility wheelchair accessible     Home Layout Able to live on 1st floor     Equipment Currently Used at Home wheelchair     Readmission within 30 days? No     Patient currently being followed by outpatient case management? No     Do you currently have service(s) that help you manage your care at home? Yes     How Many hours does patient receive services --   24/7    Name and Contact number of agency [facility] nursing staff     Is the pt/caregiver preference to resume services with current agency Yes     Do you take prescription medications? Yes     Do you have prescription coverage? Yes     Coverage Medicare A&B  and Medicaid of [place]     Do you have any problems affording any of your prescribed medications? No     Is the patient taking medications as prescribed? yes     Who is going to help you get home at discharge? EMS     How do you get to doctors appointments? health plan transportation     Are you on dialysis? Yes     Dialysis Name and Scheduled days [facility] [address] MWF @ 10:00 am     Do you take coumadin? No   Eliquis    DME Needed Upon Discharge  none     Discharge Plan discussed with: Caregiver     Name(s) and Number(s) nurse Rolo/[facility]/# [phone]     Transition of Care Barriers None     Discharge Plan A Return to nursing home   [facility]    Discharge Plan B Skilled Nursing Facility        Physical Activity    On average, how many days per week do you engage in moderate to strenuous exercise (like a brisk walk)? 0 days     On average, how many minutes do you engage in exercise at this level? 0 min        Financial Resource Strain    How hard is it for you to pay for the very basics like food, housing, medical care, and heating? Not hard at all        Housing Stability    In the last 12 months, was  there a time when you were not able to pay the mortgage or rent on time? No     In the last 12 months, how many places have you lived? 1     In the last 12 months, was there a time when you did not have a steady place to sleep or slept in a shelter (including now)? No        Transportation Needs    In the past 12 months, has lack of transportation kept you from medical appointments or from getting medications? No     In the past 12 months, has lack of transportation kept you from meetings, work, or from getting things needed for daily living? No        Food Insecurity    Within the past 12 months, you worried that your food would run out before you got the money to buy more. Never true     Within the past 12 months, the food you bought just didn't last and you didn't have money to get more. Never true        Stress    Do you feel stress - tense, restless, nervous, or anxious, or unable to sleep at night because your mind is troubled all the time - these days? To some extent        Social Connections    In a typical week, how many times do you talk on the phone with family, friends, or neighbors? Patient refused     How often do you get together with friends or relatives? Patient refused     How often do you attend Orthodoxy or Buddhist services? Never     Do you belong to any clubs or organizations such as Orthodoxy groups, unions, fraternal or athletic groups, or school groups? No     How often do you attend meetings of the clubs or organizations you belong to? Never     Are you , , , , never , or living with a partner? Never         Alcohol Use    Q1: How often do you have a drink containing alcohol? Never     Q2: How many drinks containing alcohol do you have on a typical day when you are drinking? Patient does not drink     Q3: How often do you have six or more drinks on one occasion? Never        OTHER    Name(s) of People in Home Patient is in detention care at [facility].                   Spoke to Rolo, nurse for [facility].  Patient lives in a nursing home. Post hospital stay nursing home staff will be his support person and caretaker in the nursing home.  Patient has transportation at discharge with ems.   There have been no hospitalizations within the last 30 days per staff.  Verified patient's PCP and preferred Pharmacy.  Rolo states not on Coumadin but takes eliquis.  Patient is receiving dialysis at [facility] on [address] MWF.    All questions answered regarding Case Management Discharge Planning, Rolo verbalized understanding.   ADALGISA provided contact information for any further questions.      Jayesh Cortes LMSW  [facility]  [phone]

## 2023-08-22 NOTE — PLAN OF CARE
MICU DAILY GOALS     Family/Goals of care/Code Status   Code Status: Full Code    24H Vital Sign Range  Temp:  [97.7 °F (36.5 °C)-98.6 °F (37 °C)]   Pulse:  [58-71]   Resp:  [16-47]   BP: (144-227)/(76-99)   SpO2:  [88 %-100 %]      Shift Events   No acute events throughout shift; No bleeding from nose today; BP elevated; hydralazine 25 mg po given as per PRN order; POC: Dialysis tonight.    AWAKE RASS: Goal - RASS Goal: 0-->alert and calm  Actual - RASS (Osorio Agitation-Sedation Scale): alert and calm    Restraint necessity: Not necessary   BREATHE SBT: Not intubated    Coordinate A & B, analgesics/sedatives Pain: managed   SAT: Not intubated   Delirium CAM-ICU: Overall CAM-ICU: Negative   Early(intubated/ Progressive (non-intubated) Mobility MOVE Screen: Pass   Activity: Activity Management: Arm raise - L1, Rolling - L1   Feeding/Nutrition Diet order: Diet/Nutrition Received: consistent carb/diabetic diet, Specialty Diet/Nutrition Received: renal diet   Thrombus DVT prophylaxis: VTE Required Core Measure: Pharmacological prophylaxis initiated/maintained   HOB Elevation Head of Bed (HOB) Positioning: HOB at 30-45 degrees   Ulcer Prophylaxis GI: yes   Glucose control managed Glycemic Management: blood glucose monitored   Skin Skin assessed during daily assessment. No.   Bowel Function no issues    Indwelling Catheter Necessity         No   De-escalation Antibiotics No       VS and assessment per flow sheet, patient progressing towards goals as tolerated, plan of care reviewed with Cosme Lewis, all concerns addressed, will continue to monitor.

## 2023-08-22 NOTE — ASSESSMENT & PLAN NOTE
Cosme Lewis is a 58 y.o. male w/ PMH of COPD on home O2 presents with Hyperkalemia [E87.5];Bleeding [R58];Bleeding from dialysis shunt, initial encounter [T82.798A]  ENT consulted for epistaxis.       - Left nare absorbable packing will auto-dissolve. Do not need anti-staph antibiotics.  - Afrin to bilateral nares q8h for the next 48h  - Recommend humidified Oxygen if necessary, eg facemask. Avoid nasal cannula, as this causes trauma that leads to epistaxis.   - aquaphor to bilateral nares bid  - AAOHNS guideline discourages holding anticoagulation for epistaxis   - May place mustache dressing under nose for trickling, ok if dressing becomes saturated with some red/brown drainage  - Nasal saline q4h while packing in place; with nightly application of vaseline/aquaphor to anterior nares  - Keep head of bed elevated  - Apply Afrin to affected nasal cavity if epistaxis recurs, please see detailed instructions below   - Please call ENT with questions  - Remainder of care per primary team        PATIENT INSTRUCTIONS:    Nasal precautions  - DO NOT blow your nose for 2 weeks. The only exception is that you may lightly blow your nose after using a sinus rinse.  - Sneeze/cough with mouth open  - Avoid irritating substances that might make you sneeze, such as dust, chalk, harsh chemicals, and allergic triggers. This might also include spicy foods.  - Do not smoke   - Avoid flying or swimming for 2 weeks.    - Avoid all heavy lifting, straining or bending for 2 weeks.   - Avoid semi-contact sports or vigorous exercising for 3-4 weeks.      Aftercare/ moisturizing recommendations to decrease frequency of nosebleeds:  - Daily nasal saline sprays/rinses  - Nightly application of vaseline/aquaphor to anterior nares  - Room humidification  - Avoidance of nasal cannula if possible (always with humidification and please use face tent, nasal cup, facemask if possible)  Management of medical conditions contributing to epistaxis  (coagulopathy, hypertension, etc.)  - Humidification of CPAP appliance if applicable    If rebleeding occurs:   - Apply Afrin liberally to both nostrils  - Apply pressure over the soft part of the nose for 15 minutes (clip or digital pressure, important to have pressure over soft part of nose and consistent pressure (do not release pressure at any point))  - Instruct patient to lean forward, avoid swallowing blood. This can cause nausea and vomiting  - Can repeat these steps above up to 3 times  - Call/reconsult ENT or return to the nearest emergency department if this is unsuccessful

## 2023-08-22 NOTE — ASSESSMENT & PLAN NOTE
Recent Labs     08/20/23  0441 08/20/23  0804 08/21/23  0402 08/21/23  0852 08/22/23  0430      < > 134* 133* 135*   K 3.6   < > 5.3* 5.0 4.5   CO2 26   < > 26 27 24   BUN 38*   < > 46* 48* 29*   CREATININE 4.0*   < > 4.7* 4.7* 3.7*   PHOS 3.2  --  3.6  --  3.3   CALCIUM 7.7*   < > 7.7* 8.0* 8.2*   ALBUMIN 3.1*  --  2.8*  --  2.9*    < > = values in this interval not displayed.         Nephrology consulted for HD. Appreciate recs  Continue Sevelamer 800 TID

## 2023-08-22 NOTE — SUBJECTIVE & OBJECTIVE
Interval History: Tolerated HD yesterday, with a net UF of 3L. Patient on 5L simple face mask this morning. Patient endorses SOB on exam.     Objective:     Vital Signs (Most Recent):  Temp: 98.6 °F (37 °C) (08/22/23 1100)  Pulse: 63 (08/22/23 1100)  Resp: 19 (08/22/23 1100)  BP: (!) 156/85 (08/22/23 1100)  SpO2: 98 % (08/22/23 1100) Vital Signs (24h Range):  Temp:  [97.8 °F (36.6 °C)-98.6 °F (37 °C)] 98.6 °F (37 °C)  Pulse:  [59-73] 63  Resp:  [16-36] 19  SpO2:  [88 %-100 %] 98 %  BP: (144-227)/() 156/85     Weight: 68.9 kg (151 lb 14.4 oz) (08/20/23 0525)  Body mass index is 22.43 kg/m².  Body surface area is 1.83 meters squared.    I/O last 3 completed shifts:  In: 1091 [I.V.:370.5; Other:500; IV Piggyback:220.5]  Out: 3500 [Other:3500]     Physical Exam  Constitutional:       Appearance: Normal appearance.   HENT:      Head: Normocephalic and atraumatic.      Nose: Nose normal.      Mouth/Throat:      Mouth: Mucous membranes are moist.      Pharynx: Oropharynx is clear.   Eyes:      Conjunctiva/sclera: Conjunctivae normal.   Cardiovascular:      Rate and Rhythm: Normal rate.      Comments: LUE AVF with +thrill and +bruit   Pulmonary:      Effort: Pulmonary effort is normal.   Abdominal:      General: There is distension.   Musculoskeletal:         General: Normal range of motion.      Cervical back: Normal range of motion and neck supple.   Skin:     General: Skin is warm and dry.   Neurological:      General: No focal deficit present.      Mental Status: He is alert and oriented to person, place, and time.   Psychiatric:         Mood and Affect: Mood normal.         Behavior: Behavior normal.          Significant Labs:  CBC:   Recent Labs   Lab 08/22/23  0430   WBC 4.44   RBC 2.98*   HGB 8.7*   HCT 28.7*      MCV 96   MCH 29.2   MCHC 30.3*       CMP:   Recent Labs   Lab 08/22/23  0430   *   CALCIUM 8.2*   ALBUMIN 2.9*   PROT 6.2   *   K 4.5   CO2 24      BUN 29*   CREATININE 3.7*    ALKPHOS 155*   ALT 8*   AST 20   BILITOT 0.7       All labs within the past 24 hours have been reviewed.

## 2023-08-22 NOTE — ASSESSMENT & PLAN NOTE
-Patient's with Normocytic anemia..   -Hemoglobin stable.   -Etiology likely due to chronic disease .  -Current CBC reviewed-    Recent Labs   Lab 08/21/23  0402 08/21/23  1416 08/22/23  0430   HGB 8.7* 9.0* 8.7*       PLAN  - Monitor CBC and transfuse if Hgb < 7    No

## 2023-08-22 NOTE — ASSESSMENT & PLAN NOTE
Hyperglycemic on admission requiring insulin infusion temporarily    Home regimen: No insulin listed on home meds      Last A1c:   Lab Results   Component Value Date    HGBA1C 10.7 (H) 07/18/2023      Recent Labs     08/21/23  1159 08/21/23  1622 08/21/23  2214 08/22/23  0019 08/22/23  0738 08/22/23  1126   POCTGLUCOSE 130* 137* 275* 256* 95 84         Plan:  Antihyperglycemics (From admission, onward)    Start     Stop Route Frequency Ordered    08/22/23 2100  insulin detemir U-100 (Levemir) pen 5 Units         -- SubQ 2 times daily 08/22/23 0953    08/22/23 1051  insulin aspart U-100 pen 1-10 Units         -- SubQ Before meals & nightly PRN 08/22/23 0953    08/21/23 1130  insulin aspart U-100 pen 4 Units         -- SubQ 3 times daily with meals 08/21/23 0749      - Renal diet  - POCT glucose ACHS  - Will monitor glucose results and adjust insulin regimen accordingly

## 2023-08-22 NOTE — ASSESSMENT & PLAN NOTE
Hx of recurrent epistaxis    Differential diagnoses of etiology include, but not limited to: Anticoagulation use, home oxygen use, HTN    PLAN:   ENT evaluated persistent epistaxis with recommendations for  Afrin, Aquaphor, Facemask use and nasal precautions(see consult note dated 08/21/23). Consult also noted [facility] guideline discourage holding anticoagulation for epistaxis so Aspirin and Apixaban was resumed.

## 2023-08-23 NOTE — PROGRESS NOTES
[facility] - Cardiac Medical ICU  Critical Care Medicine  Progress Note    Patient Name: Cosme Lewis  MRN: [id number]  Admission Date: 8/19/2023  Hospital Length of Stay: 3 days  Code Status: Full Code  Attending Provider: Chente Newell MD  Primary Care Provider: Eliecer Ohara III, MD   Principal Problem: Bleeding from dialysis shunt    Subjective:     HPI:  57 y.o. male with CHF, ESRD on HD MWF, history of PE on eliquis, HTN, DM presents to the hospital with a chief complaint of inability to dialyze. The patient has a LUE fistula that was noted to have some bleeding after dialysis on Wednesday of this week. He was unable to dialyze Friday secondary to bleeding/oozing with no intervention. He was sent to the ER this evening for worsening shortness of breath and persistent bleeding at the AV fistula site.    In the ER he was noted to be hypoxemic, mildly hypercapnic with a slow bleed form the AV fistula at the previous areas of access. ER placed quick clot and pressure with eventual stopping of the bleed. Vascular surgery was consulted who plans to take him to the OR in the morning for fistula revision as he has developed significant pseudoaneurysm. He is currently on 10L NC saturating well without an immediate need for urgent dialysis (seen by nephrology in the ER as well).    ICU team has been consulted for acute hypoxemic respiratory failure in the setting of severe volume overload and inability to dialyze through his current access.       Hospital/ICU Course:  Admitted to MICU for concerns of AV fistula malfunction. Started on insulin drip temporarily for hyperglycemia and transitioned to scheduled insulin.  LUE U/S noted AV fistula was patent. Vascular surgery evaluation noted no flow limiting stenosis, abnormality in lumen of proximal fistula without flow limitation, large pseudoaneurysms to mid AVF. Nephrology consulted for dialysis. ENT evaluated persistent epistaxis with recommendations for  Afrin, Aquaphor,  Facemask use and nasal precautions(see consult note dated 08/21/23). Consult also noted [facility] guideline discourage holding anticoagulation for epistaxis so Aspirin and Apixaban was resumed. Elevated BP so resumed home medications and increased Carvedilol dose. Pending stepdown to hospital medicine.      Interval History/Significant Events: No acute events overnight, afebrile. Elevated BP overnight so additional dose of Nifedipine dose administered and Carvedilol dose increased.       Review of Systems   Constitutional:  Negative for chills and fever.   HENT:  Negative for facial swelling and nosebleeds.    Respiratory:  Positive for shortness of breath (chronic(stable on NC)). Negative for cough.    Cardiovascular:  Negative for chest pain and leg swelling.   Gastrointestinal:  Negative for constipation, diarrhea, nausea and vomiting.   Genitourinary:  Positive for difficulty urinating (ESRD on HD).   Neurological:  Negative for dizziness and headaches.   Psychiatric/Behavioral:  Negative for sleep disturbance.      Objective:     Vital Signs (Most Recent):  Temp: 99 °F (37.2 °C) (08/23/23 0705)  Pulse: 63 (08/23/23 0900)  Resp: (!) 24 (08/23/23 0900)  BP: (!) 149/78 (08/23/23 0921)  SpO2: 100 % (08/23/23 0900) Vital Signs (24h Range):  Temp:  [97.6 °F (36.4 °C)-99 °F (37.2 °C)] 99 °F (37.2 °C)  Pulse:  [58-80] 63  Resp:  [17-47] 24  SpO2:  [93 %-100 %] 100 %  BP: (135-224)/() 149/78   Weight: 68.9 kg (151 lb 14.4 oz)  Body mass index is 22.43 kg/m².      Intake/Output Summary (Last 24 hours) at 8/23/2023 0938  Last data filed at 8/23/2023 0000  Gross per 24 hour   Intake 370 ml   Output 3500 ml   Net -3130 ml          Physical Exam  Vitals and nursing note reviewed.   Constitutional:       General: He is not in acute distress.     Appearance: He is not ill-appearing, toxic-appearing or diaphoretic.      Interventions: Nasal cannula in place.   HENT:      Head: Normocephalic and atraumatic.      Mouth/Throat:       Mouth: Mucous membranes are moist.   Eyes:      General: No scleral icterus.  Cardiovascular:      Rate and Rhythm: Normal rate and regular rhythm.      Pulses: Normal pulses.      Arteriovenous access: Left arteriovenous access is present.     Comments: LUE AVF with dressing clean/dry/intact. Bruit and thrill present.   Pulmonary:      Effort: Pulmonary effort is normal. No respiratory distress.      Breath sounds: No wheezing or rales.   Abdominal:      General: There is distension.      Palpations: Abdomen is soft.      Tenderness: There is no abdominal tenderness. There is no guarding or rebound.   Musculoskeletal:      Right lower leg: No edema.      Left lower leg: No edema.   Skin:     General: Skin is warm and dry.      Coloration: Skin is not jaundiced.   Neurological:      Mental Status: He is alert and oriented to person, place, and time. Mental status is at baseline.   Psychiatric:         Mood and Affect: Mood normal.         Behavior: Behavior normal.            Vents:     Lines/Drains/Airways       Peripheral Intravenous Line  Duration                  Hemodialysis AV Fistula Left upper arm -- days         Peripheral IV - Single Lumen 08/19/23 2131 22 G Posterior;Right Hand 3 days         Peripheral IV - Single Lumen 08/20/23 0230 20 G Anterior;Right Forearm 3 days                    Significant Labs: All pertinent labs within the past 24 hours have been reviewed.  LABS:  Recent Labs   Lab 08/21/23  0852 08/22/23  0430 08/23/23  0454   * 135* 133*   K 5.0 4.5 4.6   CL 95 102 101   CO2 27 24 22*   BUN 48* 29* 23*   CREATININE 4.7* 3.7* 2.9*   * 134* 133*   ANIONGAP 11 9 10     Recent Labs   Lab 08/21/23  0402 08/22/23  0430 08/23/23  0454   MG 2.1 2.1 2.0   PHOS 3.6 3.3 3.4     Recent Labs   Lab 08/21/23  0402 08/22/23  0430 08/23/23  0454   AST 26 20 20   ALT 16 8* 6*   ALKPHOS 154* 155* 156*   BILITOT 0.6 0.7 0.7   ALBUMIN 2.8* 2.9* 3.0*     POCT Glucose:   Recent Labs   Lab  08/22/23  2227 08/23/23  0137 08/23/23  0726   POCTGLUCOSE 101 140* 180*    Recent Labs   Lab 08/21/23  1416 08/22/23  0430 08/23/23  0454   WBC 6.15 4.44 5.94   HGB 9.0* 8.7* 9.2*   HCT 28.6* 28.7* 29.6*    164 155   GRAN 74.1*  4.6 63.9  2.8 77.5*  4.6              Significant Imaging: I have reviewed all pertinent imaging results/findings within the past 24 hours.  US Extremity Non Vascular Limited Left   Final Result      As above.      Electronically signed by resident: Ray Renner   Date:    08/20/2023   Time:    09:34      Electronically signed by: Hal Garcia Jr   Date:    08/20/2023   Time:    09:41      X-Ray Chest AP Portable   Final Result      Please see above.         Electronically signed by: Yakelin Holm MD   Date:    08/19/2023   Time:    22:26          Inpatient Medications:  Continuous Infusions:  Scheduled Meds:   apixaban  5 mg Oral BID    aspirin  81 mg Oral Daily    atorvastatin  40 mg Oral Daily    carvediloL  12.5 mg Oral BID    cloNIDine 0.3 mg/24 hr td ptwk  1 patch Transdermal Q7 Days    FLUoxetine  40 mg Oral Daily    fluticasone furoate-vilanteroL  1 puff Inhalation Daily    insulin aspart U-100  4 Units Subcutaneous TIDWM    insulin detemir U-100  5 Units Subcutaneous BID    isosorbide mononitrate  30 mg Oral Daily    lisinopriL  40 mg Oral QHS    mupirocin   Nasal BID    NIFEdipine  60 mg Oral BID    oxymetazoline  2 spray Each Nostril Q8H    sevelamer carbonate  800 mg Oral TID    sodium chloride  1 spray Each Nostril Q4H    tiotropium bromide  2 puff Inhalation Daily    white petrolatum   Topical (Top) BID     PRN Meds:sodium chloride 0.9%, acetaminophen, dextrose 10%, dextrose 10%, glucagon (human recombinant), glucose, glucose, hydrALAZINE, insulin aspart U-100, labetalol, melatonin, ondansetron, QUEtiapine, sodium chloride 0.9%        ABG  Recent Labs   Lab 08/19/23  2347   PH 7.412   PO2 74*   PCO2 47.4*   HCO3 30.2*   BE 6      Assessment/Plan:     ENT  Epistaxis  Hx of recurrent epistaxis    Differential diagnoses of etiology include, but not limited to: Anticoagulation use, home oxygen use, HTN    -ENT evaluated persistent epistaxis with recommendations for  Afrin, Aquaphor, Facemask use and nasal precautions(see consult note dated 08/21/23). Consult also noted [facility] guideline discourage holding anticoagulation for epistaxis so Aspirin and Apixaban was resumed.     -Patient was counseled extensively on epistaxis precautions, however he still preferred nasal cannula use due to discomfort with facemask use.     PLAN:   Per ENT recs(see consult note dated 08/21/23)    Pulmonary  Acute on chronic respiratory failure with hypoxia and hypercapnia  Patient with Hypoxic Respiratory failure which is Acute on chronic.  he is on home oxygen at 2LPM.   .   Signs/symptoms of respiratory failure include- respiratory distress. Contributing diagnoses includes - CHF and COPD Labs and images were reviewed. Patient Has not had a recent ABG. Will treat underlying causes and adjust management of respiratory failure as follows- Wean supplemental oxygen as tolerated for SaO2 goal >90% and HD for volume control    COPD (chronic obstructive pulmonary disease)  Reported h/o COPD. Continue supplemental oxygen.    Please see hypoxemic respiratory failure    Cardiac/Vascular  * Bleeding from dialysis shunt  AVF bleeding controlled with manual pressure by ED staff. Compressive dressing placed.   -LUE U/S noted AV fistula was patent.   -Vascular surgery evaluation noted no flow limiting stenosis, abnormality in lumen of proximal fistula without flow limitation, large pseudoaneurysms to mid AVF.     PLAN:  -Nephrology consulted for dialysis.    Hypertension  Hypertensive during hospitalization    Home Medications:  Hypertension Medications             carvediloL (COREG) 6.25 MG tablet Take 3.125 mg by mouth 2 (two) times daily.    cloNIDine 0.3 mg/24 hr td ptwk  "(CATAPRES) 0.3 mg/24 hr APPLY ONE PATCH TOPICALLY EVERY WEEK FOR HIGH BLOOD PRESSURE    isosorbide mononitrate (IMDUR) 30 MG 24 hr tablet Take 1 tablet (30 mg total) by mouth once daily.    lisinopriL (PRINIVIL,ZESTRIL) 40 MG tablet Take 1 tablet (40 mg total) by mouth every evening.    NIFEdipine (PROCARDIA-XL) 60 MG (OSM) 24 hr tablet Take 1 tablet (60 mg total) by mouth 2 (two) times a day.          PLAN:  Continue home meds as tolerated. Increased home Carvedilol to 12.5mg BID.     HLD (hyperlipidemia)  Continue home statin    Chronic diastolic heart failure  HFpEF - received 120 mg of lasix in the ED.   Echo    Result Date: 3/20/2023  · The left ventricle is normal in size with normal systolic function.  · The estimated ejection fraction is 55%.  · Grade II left ventricular diastolic dysfunction.  · Moderate right ventricular enlargement with moderately reduced right   ventricular systolic function.  · Biatrial enlargement.  · Interatrial septum bows to left, consider elevated right atrial   pressure.  · Mild tricuspid regurgitation.  · The estimated PA systolic pressure is 54 mmHg.  · There is pulmonary hypertension.  · Elevated central venous pressure (15 mmHg).  · Small circumferential pericardial effusion.  · There are bilateral pleural effusions.  · There is ascites present.      Will consult Nephrology for HD      Renal/  ESRD on hemodialysis  See "ESRD (end stage renal disease)" A&P      Chronic kidney disease-mineral and bone disorder  See "ESRD (end stage renal disease)" A&P      ESRD (end stage renal disease)  Recent Labs     08/21/23  0402 08/21/23  0852 08/22/23  0430 08/23/23  0454   * 133* 135* 133*   K 5.3* 5.0 4.5 4.6   CO2 26 27 24 22*   BUN 46* 48* 29* 23*   CREATININE 4.7* 4.7* 3.7* 2.9*   PHOS 3.6  --  3.3 3.4   CALCIUM 7.7* 8.0* 8.2* 8.4*   ALBUMIN 2.8*  --  2.9* 3.0*         Nephrology consulted for HD. Appreciate recs  Continue Sevelamer 800 TID    Oncology  Anemia in ESRD " (end-stage renal disease)  -Patient's with Normocytic anemia..   -Hemoglobin stable.   -Etiology likely due to chronic disease .  -Current CBC reviewed-    Recent Labs   Lab 08/21/23  1416 08/22/23  0430 08/23/23  0454   HGB 9.0* 8.7* 9.2*       PLAN  - Monitor CBC and transfuse if Hgb < 7     Endocrine  Hyperglycemia due to type 2 diabetes mellitus  Hyperglycemic on admission requiring insulin infusion temporarily    Home regimen: No insulin listed on home meds      Last A1c:   Lab Results   Component Value Date    HGBA1C 10.7 (H) 07/18/2023      Recent Labs     08/22/23  1126 08/22/23  1628 08/22/23  2227 08/23/23  0137 08/23/23  0726 08/23/23  1154   POCTGLUCOSE 84 87 101 140* 180* 127*         Plan:  Antihyperglycemics (From admission, onward)    Start     Stop Route Frequency Ordered    08/22/23 2100  insulin detemir U-100 (Levemir) pen 5 Units         -- SubQ 2 times daily 08/22/23 0953    08/22/23 1051  insulin aspart U-100 pen 1-10 Units         -- SubQ Before meals & nightly PRN 08/22/23 0953    08/21/23 1130  insulin aspart U-100 pen 4 Units         -- SubQ 3 times daily with meals 08/21/23 0749      - Renal diet  - POCT glucose ACHS  - Will monitor glucose results and adjust insulin regimen accordingly     Critical Care Daily Checklist:    A: Awake: RASS Goal/Actual Goal: RASS Goal: 0-->alert and calm  Actual:     B: Spontaneous Breathing Trial Performed?  NA   C: SAT & SBT Coordinated?  NA                      D: Delirium: CAM-ICU Overall CAM-ICU: Negative   E: Early Mobility Performed? Yes   F: Feeding Goal:    Status:     Current Diet Order   Procedures    Diet renal      AS: Analgesia/Sedation NA   T: Thromboembolic Prophylaxis Eliquis   H: HOB > 300 Yes   U: Stress Ulcer Prophylaxis (if needed) NA   G: Glucose Control Scheduled insulin   B: Bowel Function Stool Occurrence: 1   I: Indwelling Catheter (Lines & Dutta) Necessity PIV, AVF   D: De-escalation of Antimicrobials/Pharmacotherapies NA    Plan  for the day/ETD Stepdown to hospital medicine    Code Status:  Family/Goals of Care: Full Code       Critical secondary to Patient has a condition that poses threat to life and bodily function: ESRD with AV fistula concerns(Resolved, now pending stepdown)     Critical care was time spent personally by me on the following activities: development of treatment plan with patient or surrogate and bedside caregivers, discussions with consultants, evaluation of patient's response to treatment, examination of patient, ordering and performing treatments and interventions, ordering and review of laboratory studies, ordering and review of radiographic studies, pulse oximetry, re-evaluation of patient's condition. This critical care time did not overlap with that of any other provider or involve time for any procedures.     Gary Howe,   Critical Care Medicine  [facility] - Cardiac Medical ICU

## 2023-08-23 NOTE — ASSESSMENT & PLAN NOTE
-Patient's with Normocytic anemia..   -Hemoglobin stable.   -Etiology likely due to chronic disease .  -Current CBC reviewed-    Recent Labs   Lab 08/21/23  1416 08/22/23  0430 08/23/23  0454   HGB 9.0* 8.7* 9.2*       PLAN  - Monitor CBC and transfuse if Hgb < 7

## 2023-08-23 NOTE — ASSESSMENT & PLAN NOTE
Hx of recurrent epistaxis    Differential diagnoses of etiology include, but not limited to: Anticoagulation use, home oxygen use, HTN    -ENT evaluated persistent epistaxis with recommendations for  Afrin, Aquaphor, Facemask use and nasal precautions(see consult note dated 08/21/23). Consult also noted [facility] guideline discourage holding anticoagulation for epistaxis so Aspirin and Apixaban was resumed.     -Patient was counseled extensively on epistaxis precautions, however he still preferred nasal cannula use due to discomfort with facemask use.     PLAN:   Per ENT recs(see consult note dated 08/21/23)

## 2023-08-23 NOTE — ASSESSMENT & PLAN NOTE
Hyperglycemic on admission requiring insulin infusion temporarily    Home regimen: No insulin listed on home meds      Last A1c:   Lab Results   Component Value Date    HGBA1C 10.7 (H) 07/18/2023      Recent Labs     08/22/23  1126 08/22/23  1628 08/22/23  2227 08/23/23  0137 08/23/23  0726 08/23/23  1154   POCTGLUCOSE 84 87 101 140* 180* 127*         Plan:  Antihyperglycemics (From admission, onward)    Start     Stop Route Frequency Ordered    08/22/23 2100  insulin detemir U-100 (Levemir) pen 5 Units         -- SubQ 2 times daily 08/22/23 0953    08/22/23 1051  insulin aspart U-100 pen 1-10 Units         -- SubQ Before meals & nightly PRN 08/22/23 0953    08/21/23 1130  insulin aspart U-100 pen 4 Units         -- SubQ 3 times daily with meals 08/21/23 0749      - Renal diet  - POCT glucose ACHS  - Will monitor glucose results and adjust insulin regimen accordingly

## 2023-08-23 NOTE — SUBJECTIVE & OBJECTIVE
Interval History: Tolerated HD overnight, with a net UF of 3.1L. NAEON.     Objective:     Vital Signs (Most Recent):  Temp: 99 °F (37.2 °C) (08/23/23 0705)  Pulse: 63 (08/23/23 0900)  Resp: (!) 24 (08/23/23 0900)  BP: (!) 149/78 (08/23/23 0921)  SpO2: 100 % (08/23/23 0900) Vital Signs (24h Range):  Temp:  [97.6 °F (36.4 °C)-99 °F (37.2 °C)] 99 °F (37.2 °C)  Pulse:  [58-80] 63  Resp:  [17-47] 24  SpO2:  [93 %-100 %] 100 %  BP: (135-224)/() 149/78     Weight: 68.9 kg (151 lb 14.4 oz) (08/20/23 0525)  Body mass index is 22.43 kg/m².  Body surface area is 1.83 meters squared.    I/O last 3 completed shifts:  In: 961 [P.O.:370; I.V.:370.5; IV Piggyback:220.5]  Out: 3500 [Other:3500]     Physical Exam  Constitutional:       Appearance: Normal appearance.   HENT:      Head: Normocephalic and atraumatic.      Nose: Nose normal.      Mouth/Throat:      Mouth: Mucous membranes are moist.      Pharynx: Oropharynx is clear.   Eyes:      Conjunctiva/sclera: Conjunctivae normal.   Cardiovascular:      Rate and Rhythm: Normal rate.      Comments: LUE AVF with +thrill and +bruit   Pulmonary:      Effort: Pulmonary effort is normal.   Abdominal:      General: There is distension.   Musculoskeletal:         General: Normal range of motion.      Cervical back: Normal range of motion and neck supple.   Skin:     General: Skin is warm and dry.   Neurological:      General: No focal deficit present.      Mental Status: He is alert and oriented to person, place, and time.   Psychiatric:         Mood and Affect: Mood normal.         Behavior: Behavior normal.          Significant Labs:  CBC:   Recent Labs   Lab 08/23/23  0454   WBC 5.94   RBC 3.10*   HGB 9.2*   HCT 29.6*      MCV 96   MCH 29.7   MCHC 31.1*       CMP:   Recent Labs   Lab 08/23/23  0454   *   CALCIUM 8.4*   ALBUMIN 3.0*   PROT 6.3   *   K 4.6   CO2 22*      BUN 23*   CREATININE 2.9*   ALKPHOS 156*   ALT 6*   AST 20   BILITOT 0.7       All  labs within the past 24 hours have been reviewed.

## 2023-08-23 NOTE — ASSESSMENT & PLAN NOTE
Outpatient HD Information:    -Outpatient HD unit: [facility] [place]   -HD tx days: MWF   -HD tx time: 4hrs   -Last HD tx prior to presentation: 8/16/23  -HD access: LUE AVF   -HD modality: iHD   -Residual urine: Minimal to anuric       Plan/Recommendations:    -Will evaluate need for HD tomorrow. Otherwise, tentatively plan for next HD on Friday   -Renal diet  -Strict I/O's and daily weights  -Daily renal function panels   -Renally dose meds

## 2023-08-23 NOTE — ASSESSMENT & PLAN NOTE
Recent Labs     08/21/23  0402 08/21/23  0852 08/22/23  0430 08/23/23  0454   * 133* 135* 133*   K 5.3* 5.0 4.5 4.6   CO2 26 27 24 22*   BUN 46* 48* 29* 23*   CREATININE 4.7* 4.7* 3.7* 2.9*   PHOS 3.6  --  3.3 3.4   CALCIUM 7.7* 8.0* 8.2* 8.4*   ALBUMIN 2.8*  --  2.9* 3.0*         Nephrology consulted for HD. Appreciate recs  Continue Sevelamer 800 TID

## 2023-08-23 NOTE — ASSESSMENT & PLAN NOTE
Hypertensive during hospitalization    Home Medications:  Hypertension Medications             carvediloL (COREG) 6.25 MG tablet Take 3.125 mg by mouth 2 (two) times daily.    cloNIDine 0.3 mg/24 hr td ptwk (CATAPRES) 0.3 mg/24 hr APPLY ONE PATCH TOPICALLY EVERY WEEK FOR HIGH BLOOD PRESSURE    isosorbide mononitrate (IMDUR) 30 MG 24 hr tablet Take 1 tablet (30 mg total) by mouth once daily.    lisinopriL (PRINIVIL,ZESTRIL) 40 MG tablet Take 1 tablet (40 mg total) by mouth every evening.    NIFEdipine (PROCARDIA-XL) 60 MG (OSM) 24 hr tablet Take 1 tablet (60 mg total) by mouth 2 (two) times a day.          PLAN:  Continue home meds as tolerated. Increased home Carvedilol to 12.5mg BID.

## 2023-08-23 NOTE — PLAN OF CARE
MICU DAILY GOALS     Family/Goals of care/Code Status   Code Status: Full Code    24H Vital Sign Range  Temp:  [97.6 °F (36.4 °C)-99 °F (37.2 °C)]   Pulse:  [61-80]   Resp:  [17-36]   BP: (110-224)/()   SpO2:  [93 %-100 %]      Shift Events   No acute events throughout shift    AWAKE RASS: Goal - RASS Goal: 0-->alert and calm  Actual - RASS (Osorio Agitation-Sedation Scale): alert and calm    Restraint necessity: Not necessary   BREATHE SBT: Not intubated    Coordinate A & B, analgesics/sedatives Pain: managed   SAT: Not intubated   Delirium CAM-ICU: Overall CAM-ICU: Negative   Early(intubated/ Progressive (non-intubated) Mobility MOVE Screen: Pass   Activity: Activity Management: Rolling - L1   Feeding/Nutrition Diet order: Diet/Nutrition Received: consistent carb/diabetic diet, Specialty Diet/Nutrition Received: renal diet   Thrombus DVT prophylaxis: VTE Required Core Measure: Pharmacological prophylaxis initiated/maintained   HOB Elevation Head of Bed (HOB) Positioning: HOB at 30-45 degrees   Ulcer Prophylaxis GI: yes   Glucose control managed Glycemic Management: blood glucose monitored   Skin Skin assessed during daily assessment. LDA present.   Bowel Function no issues    Indwelling Catheter Necessity              De-escalation Antibiotics Yes       VS and assessment per flow sheet, patient progressing towards goals as tolerated, plan of care reviewed with family, all concerns addressed, will continue to monitor.

## 2023-08-23 NOTE — PROGRESS NOTES
[facility]  Nephrology  Progress Note    Patient Name: Cosme Lewis  MRN: [id number]  Admission Date: 8/19/2023  Hospital Length of Stay: 3 days  Attending Provider: Chente Newell MD   Primary Care Physician: Eliecer Ohara III, MD  Principal Problem:Bleeding from dialysis shunt      Interval History: Tolerated HD overnight, with a net UF of 3.1L. NAEON.     Objective:     Vital Signs (Most Recent):  Temp: 99 °F (37.2 °C) (08/23/23 0705)  Pulse: 63 (08/23/23 0900)  Resp: (!) 24 (08/23/23 0900)  BP: (!) 149/78 (08/23/23 0921)  SpO2: 100 % (08/23/23 0900) Vital Signs (24h Range):  Temp:  [97.6 °F (36.4 °C)-99 °F (37.2 °C)] 99 °F (37.2 °C)  Pulse:  [58-80] 63  Resp:  [17-47] 24  SpO2:  [93 %-100 %] 100 %  BP: (135-224)/() 149/78     Weight: 68.9 kg (151 lb 14.4 oz) (08/20/23 0525)  Body mass index is 22.43 kg/m².  Body surface area is 1.83 meters squared.    I/O last 3 completed shifts:  In: 961 [P.O.:370; I.V.:370.5; IV Piggyback:220.5]  Out: 3500 [Other:3500]    Physical Exam  Constitutional:       Appearance: Normal appearance.   HENT:      Head: Normocephalic and atraumatic.      Nose: Nose normal.      Mouth/Throat:      Mouth: Mucous membranes are moist.      Pharynx: Oropharynx is clear.   Eyes:      Conjunctiva/sclera: Conjunctivae normal.   Cardiovascular:      Rate and Rhythm: Normal rate.      Comments: LUE AVF with +thrill and +bruit   Pulmonary:      Effort: Pulmonary effort is normal.   Abdominal:      General: There is distension.   Musculoskeletal:         General: Normal range of motion.      Cervical back: Normal range of motion and neck supple.   Skin:     General: Skin is warm and dry.   Neurological:      General: No focal deficit present.      Mental Status: He is alert and oriented to person, place, and time.   Psychiatric:         Mood and Affect: Mood normal.         Behavior: Behavior normal.          Significant Labs:  CBC:   Recent Labs   Lab 08/23/23  0454   WBC 5.94    RBC 3.10*   HGB 9.2*   HCT 29.6*      MCV 96   MCH 29.7   MCHC 31.1*       CMP:   Recent Labs   Lab 08/23/23  0454   *   CALCIUM 8.4*   ALBUMIN 3.0*   PROT 6.3   *   K 4.6   CO2 22*      BUN 23*   CREATININE 2.9*   ALKPHOS 156*   ALT 6*   AST 20   BILITOT 0.7       All labs within the past 24 hours have been reviewed.        Assessment/Plan:     Cardiac/Vascular  * Bleeding from dialysis shunt  -Plan per vascular surgery     Renal/  ESRD on hemodialysis  Outpatient HD Information:    -Outpatient HD unit: [facility]   -HD tx days: MWF   -HD tx time: 4hrs   -Last HD tx prior to presentation: 8/16/23  -HD access: LUE AVF   -HD modality: iHD   -Residual urine: Minimal to anuric       Plan/Recommendations:    -Will evaluate need for HD tomorrow. Otherwise, tentatively plan for next HD on Friday   -Renal diet  -Strict I/O's and daily weights  -Daily renal function panels   -Renally dose meds      Chronic kidney disease-mineral and bone disorder  -Renal diet   -Continue renvela     Oncology  Anemia in ESRD (end-stage renal disease)  -Target Hg of 10-12  -Defer MARCIA therapy at this time due to HTN         Thank you for your consult. I will follow-up with patient. Please contact us if you have any additional questions.    Abdulaziz Forbes NP  Nephrology  [facility]

## 2023-08-23 NOTE — PLAN OF CARE
MICU DAILY GOALS     Family/Goals of care/Code Status   Code Status: Full Code    24H Vital Sign Range  Temp:  [97.6 °F (36.4 °C)-98.8 °F (37.1 °C)]   Pulse:  [58-80]   Resp:  [17-47]   BP: (144-224)/()   SpO2:  [90 %-100 %]      Shift Events   No acute events throughout shift    AWAKE RASS: Goal - RASS Goal: 0-->alert and calm  Actual - RASS (Osorio Agitation-Sedation Scale): alert and calm    Restraint necessity: Not necessary   BREATHE SBT: Not intubated    Coordinate A & B, analgesics/sedatives Pain: managed   SAT: Not intubated   Delirium CAM-ICU: Overall CAM-ICU: Negative   Early(intubated/ Progressive (non-intubated) Mobility MOVE Screen: Pass   Activity: Activity Management: Rolling - L1   Feeding/Nutrition Diet order: Diet/Nutrition Received: consistent carb/diabetic diet, Specialty Diet/Nutrition Received: renal diet   Thrombus DVT prophylaxis: VTE Required Core Measure: Pharmacological prophylaxis initiated/maintained   HOB Elevation Head of Bed (HOB) Positioning: HOB at 30-45 degrees   Ulcer Prophylaxis GI: yes   Glucose control managed Glycemic Management: blood glucose monitored   Skin Skin assessed during daily assessment. LDA present.   Bowel Function no issues    Indwelling Catheter Necessity            De-escalation Antibiotics Yes       VS and assessment per flow sheet, patient progressing towards goals as tolerated, plan of care reviewed with Mr. Lewis, all concerns addressed, will continue to monitor.

## 2023-08-23 NOTE — NURSING
08/22/23 2340 08/23/23 0000   During Hemodialysis Assessment   Blood Flow Rate (mL/min) 400 mL/min  --    Dialysate Flow Rate (mL/min) 800 ml/min  --    Ultrafiltration Rate (mL/Hr) 1170 mL/Hr  --    Arteriovenous Lines Secure Yes  --    Arterial Pressure (mmHg) -180 mmHg  --    Venous Pressure (mmHg) 180  --    UF Removed (mL) 3500 mL  --    TMP 70  --    Venous Line in Air Detector Yes  --    Intake (mL) 250 mL  --    Transducer Dry Yes  --    Access Visible Yes  --    Intra-Hemodialysis Comments Tx complete  --    Post-Hemodialysis Assessment   Blood Volume Processed (Liters)  --  72 L   Dialyzer Clearance  --  Moderately streaked   Duration of Treatment  --  180 minutes   Total UF (mL)  --  3500 mL   Net Fluid Removal  --  3000   Patient Response to Treatment  --  Pt tolerated HD well   Arterial bleeding stop time (min)  --  10 min   Venous bleeding stop time (min)  --  10 min   Post-Hemodialysis Comments  --  Tx complete     HD x 3 hours via UMA AVF. UF: -3000 ml. Pt tolerated HD well.

## 2023-08-23 NOTE — PT/OT/SLP PROGRESS
Occupational Therapy      Patient Name:  Cosme Lewis   MRN:  [id number]    Per case alec't note, pt was dependent for ADL and mobiltiy skills and resides in nursing home care of NH. OT eval not warranted at this time and OT to d/c orders.   8/23/2023

## 2023-08-23 NOTE — PT/OT/SLP PROGRESS
Physical Therapy  Discharge  Patient Name:  Cosme Lewis   MRN:  [id number]  Admitting Diagnosis:  Bleeding from dialysis shunt   Recent Surgery: Procedure(s) (LRB):  Fistulogram (Left)  VENOGRAM, CENTRAL 3 Days Post-Op  Admit Date: 8/19/2023  Length of Stay: 3 days    Per chart review, patient is dependent with care and a NH resident. He is at his functional baseline and does not require acute PT at this time.     Tiffanie Hampton, PT, DPT  8/23/2023

## 2023-08-23 NOTE — ASSESSMENT & PLAN NOTE
AVF bleeding controlled with manual pressure by ED staff. Compressive dressing placed.   -LUE U/S noted AV fistula was patent.   -Vascular surgery evaluation noted no flow limiting stenosis, abnormality in lumen of proximal fistula without flow limitation, large pseudoaneurysms to mid AVF.     PLAN:  -Nephrology consulted for dialysis.

## 2023-08-24 PROBLEM — K56.609 SMALL BOWEL OBSTRUCTION: Status: ACTIVE | Noted: 2023-01-01

## 2023-08-24 NOTE — ASSESSMENT & PLAN NOTE
- ENT eval: s/p left nare packing and Afrin to bilateral nares q8h x 48h  - humidified Oxygen if necessary  - aquaphor to bilateral nares bid  - may place mustache dressing under nose for trickling  - apply Afrin to affected nasal cavity if epistaxis recurs

## 2023-08-24 NOTE — CONSULTS
[facility] Surg  General Surgery  Consult Note    Patient Name: Cosme Lewis  MRN: [id number]  Code Status: Full Code  Admission Date: 8/19/2023  Hospital Length of Stay: 4 days  Attending Physician: Selina Gallagher MD  Primary Care Provider: Eliecer Ohara III, MD    Patient information was obtained from patient, ER records and primary team.     Inpatient consult to General Surgery  Consult performed by: Cole Castellano MD  Consult ordered by: Selina Gallagher MD        Subjective:     Principal Problem: Bleeding from dialysis shunt    History of Present Illness: 58 y.o M pt w/ hx of CHF, ESRD on HD MWF, history of PE on eliquis, HTN, DM admitted for respiratory failure and volume overload in the setting of AVF malfunction. Vascular surgery performed a fistulogram on 8/20 for bleeding AVF that showed no stenosis or clot so required no further intervention. General surgery was consulted for increasing abdominal distention and emesis this am for concerns of an SBO. A KUB was done which showed multiple loops of distended bowel.   Pt reports that he was passing flatus and had a BM this am, but then began feeling nauseous and vomiting which was green. He notes increasing abdominal distention, but denies pain. Denies flatus/BM since this am. Denies history of prior abdominal surgery. Denies fever/chills, chest pain, SOB.      No current facility-administered medications on file prior to encounter.     Current Outpatient Medications on File Prior to Encounter   Medication Sig    acetaminophen (TYLENOL) 650 MG TbSR Take 650 mg by mouth every 8 (eight) hours as needed (pain or fever over 100.4).    albuterol (PROVENTIL/VENTOLIN HFA) 90 mcg/actuation inhaler Inhale 2 puffs into the lungs 4 (four) times daily as needed (breathing).    albuterol-ipratropium (DUO-NEB) 2.5 mg-0.5 mg/3 mL nebulizer solution Take 3 mLs by nebulization every 4 (four) hours while awake. Rescue    apixaban (ELIQUIS) 5 mg Tab Take 5 mg by  mouth 2 (two) times daily.    artificial tears (ISOPTO TEARS) 0.5 % ophthalmic solution Place 1 drop into both eyes 6 (six) times daily.    aspirin (ECOTRIN) 81 MG EC tablet Take 81 mg by mouth once daily.    atorvastatin (LIPITOR) 40 MG tablet Take 1 tablet (40 mg total) by mouth once daily. (Patient taking differently: Take 40 mg by mouth every evening.)    carvediloL (COREG) 6.25 MG tablet Take 3.125 mg by mouth 2 (two) times daily.    cetirizine (ZYRTEC) 10 MG tablet Take 10 mg by mouth daily as needed (itching).    cloNIDine 0.3 mg/24 hr td ptwk (CATAPRES) 0.3 mg/24 hr APPLY ONE PATCH TOPICALLY EVERY WEEK FOR HIGH BLOOD PRESSURE    epoetin xavier (PROCRIT) 10,000 unit/mL injection Inject 0.7 mLs (7,000 Units total) into the skin every Tues, Thurs, Sat.    FLUoxetine 40 MG capsule Take 40 mg by mouth once daily.    fluticasone-salmeterol diskus inhaler 250-50 mcg Inhale 1 puff into the lungs 2 (two) times daily.    GLUCAGON EMERGENCY KIT, HUMAN, 1 mg injection 1 mg into the muscle as needed if CBG < 70    HYDROPHILIC CREAM TOP Apply liberal amount to affected area twice daily for dry skin    isosorbide mononitrate (IMDUR) 30 MG 24 hr tablet Take 1 tablet (30 mg total) by mouth once daily.    lisinopriL (PRINIVIL,ZESTRIL) 40 MG tablet Take 1 tablet (40 mg total) by mouth every evening.    melatonin 3 mg TbDL Take 9 mg by mouth nightly as needed (sleep).    NIFEdipine (PROCARDIA-XL) 60 MG (OSM) 24 hr tablet Take 1 tablet (60 mg total) by mouth 2 (two) times a day.    nut.tx.imp.renal fxn,lac-reduc (NEPRO CARB STEADY ORAL) Give 1 carton by mouth with each meal.    ondansetron (ZOFRAN) 8 MG tablet Take 1 tablet by mouth 3 (three) times daily as needed for Nausea.    sevelamer carbonate (RENVELA) 800 mg Tab Take 800 mg by mouth 3 (three) times daily.    sodium chloride (SALINE NASAL) 0.65 % nasal spray 1 spray by Nasal route as needed (dry nostril).    tiotropium bromide (SPIRIVA RESPIMAT) 2.5  mcg/actuation inhaler Inhale 2 puffs into the lungs Daily.    umeclidinium (INCRUSE ELLIPTA) 62.5 mcg/actuation inhalation capsule Inhale 62.5 mcg into the lungs once daily. Controller    vitamin renal formula, B-complex-vitamin c-folic acid, (NEPHROCAP) 1 mg Cap Take 1 capsule by mouth once daily.    white petrolatum (VASELINE) ointment Apply topically once daily. Apply small amount to both nostrils daily       Review of patient's allergies indicates:  No Known Allergies    Past Medical History:   Diagnosis Date    CHF (congestive heart failure)     Chronic kidney disease-mineral and bone disorder 10/12/2022    Chronic respiratory failure     COPD (chronic obstructive pulmonary disease)     Diabetes mellitus type 1     ESRD on dialysis     Hypertension     Hypervolemia 02/22/2023    Hyponatremia 03/04/2023    Other insomnia     Recurrent major depression     SOB (shortness of breath) 10/12/2022    Patient with history COPD treated with Ellipta the albuterol, fluticasone-salmeterol tachypneic in the ED saturating 94% and 6 L on nasal cannula, patient does not know how many  he uses it is in nursing home.    -duo nebs Q 4  Given that patient is a poor historian, and in the setting of left lower extremity edema and history of coagulopathy PE cannot be ruled out.  Will workup for possible infec    Unspecified lack of coordination      Past Surgical History:   Procedure Laterality Date    AV FISTULA PLACEMENT      FISTULOGRAM Left 8/20/2023    Procedure: Fistulogram;  Surgeon: Duong Aceves MD;  Location: [facility] OR [address] [place] [address];  Service: Vascular;  Laterality: Left;  1.6 MIN  55.57 mGy  10.4137 Gycm2  24 ml dye  4 ml transradial flush    PHLEBOGRAPHY  8/20/2023    Procedure: VENOGRAM, CENTRAL;  Surgeon: Duong Aceves MD;  Location: [facility] OR [address] [place] [address];  Service: Vascular;;     Family History       Problem Relation (Age of Onset)    Diabetes Mother          Tobacco Use    Smoking status: Never     Smokeless tobacco: Never   Substance and Sexual Activity    Alcohol use: Not Currently    Drug use: Not on file    Sexual activity: Not Currently     ROS: a comprehensive review of systems was done that was negative unless otherwise stated in the HPI    Objective:     Vital Signs (Most Recent):  Temp: 97.8 °F (36.6 °C) (08/24/23 1622)  Pulse: 63 (08/24/23 1622)  Resp: 18 (08/24/23 1622)  BP: 110/64 (08/24/23 1622)  SpO2: 95 % (08/24/23 1622) Vital Signs (24h Range):  Temp:  [97.1 °F (36.2 °C)-98 °F (36.7 °C)] 97.8 °F (36.6 °C)  Pulse:  [60-72] 63  Resp:  [16-37] 18  SpO2:  [93 %-100 %] 95 %  BP: (110-168)/(64-90) 110/64     Weight: 68.9 kg (151 lb 14.4 oz)  Body mass index is 22.43 kg/m².     Physical Exam  Constitutional:       General: He is not in acute distress.     Appearance: He is not ill-appearing.   HENT:      Head: Normocephalic and atraumatic.      Nose: Nose normal.      Mouth/Throat:      Mouth: Mucous membranes are moist.      Pharynx: Oropharynx is clear.   Eyes:      Extraocular Movements: Extraocular movements intact.      Conjunctiva/sclera: Conjunctivae normal.   Cardiovascular:      Rate and Rhythm: Normal rate and regular rhythm.   Pulmonary:      Effort: Pulmonary effort is normal.   Abdominal:      General: Abdomen is flat. There is distension.      Palpations: Abdomen is soft.      Tenderness: There is abdominal tenderness (mildly diffusely tender). There is no guarding or rebound.      Comments: Tympanic to percussion on upper abdomen   Musculoskeletal:         General: Normal range of motion.      Cervical back: Normal range of motion.   Skin:     General: Skin is warm and dry.      Capillary Refill: Capillary refill takes less than 2 seconds.   Neurological:      Mental Status: He is alert and oriented to person, place, and time.            I have reviewed all pertinent lab results within the past 24 hours.    Significant Diagnostics:  I have reviewed all pertinent imaging  results/findings within the past 24 hours.      Assessment/Plan:     Small bowel obstruction  58 y.o M pt w/ hx of CHF, ESRD on HD MWF, history of PE on eliquis, HTN, DM here for AVF malfunction. General surgery consulted for possible small bowel obstruction. Increasing distention and bilious vomiting concerning for possible obstruction, ileus, or malrotation.    -NPO  -Place NGT for decompression  -Recommend CT abd/pelvis to evaluate for SBO vs ileus vs malrotation  -Thank you for the consult. Please call general surgery with any questions or concerns.      VTE Risk Mitigation (From admission, onward)         Ordered     apixaban tablet 5 mg  2 times daily         08/22/23 0731     IP VTE HIGH RISK PATIENT  Once         08/20/23 0036     Place sequential compression device  Until discontinued         08/20/23 0030                Thank you for your consult.     Cole Castellano MD  General Surgery  [facility] Surg

## 2023-08-24 NOTE — ASSESSMENT & PLAN NOTE
- suspect slow flow from volume overload, less likely mechanical  - KUB shows obstruction  - keep NPO and try NGT  - surgery consult

## 2023-08-24 NOTE — ASSESSMENT & PLAN NOTE
Patient's FSGs are controlled on current medication regimen.  Last A1c reviewed-   Lab Results   Component Value Date    HGBA1C 10.7 (H) 07/18/2023     Most recent fingerstick glucose reviewed-   Recent Labs   Lab 08/23/23  1638 08/24/23  0720 08/24/23  1130   POCTGLUCOSE 77 162* 165*     Current correctional scale  Low  Maintain anti-hyperglycemic dose as follows-   Antihyperglycemics (From admission, onward)    Start     Stop Route Frequency Ordered    08/22/23 2100  insulin detemir U-100 (Levemir) pen 5 Units         -- SubQ 2 times daily 08/22/23 0953    08/22/23 1051  insulin aspart U-100 pen 1-10 Units         -- SubQ Before meals & nightly PRN 08/22/23 0953        Hold Oral hypoglycemics while patient is in the hospital.

## 2023-08-24 NOTE — PLAN OF CARE
Problem: Adult Inpatient Plan of Care  Goal: Plan of Care Review  Outcome: Ongoing, Progressing  Goal: Patient-Specific Goal (Individualized)  Outcome: Ongoing, Progressing  Goal: Absence of Hospital-Acquired Illness or Injury  Outcome: Ongoing, Progressing  Goal: Optimal Comfort and Wellbeing  Outcome: Ongoing, Progressing  Goal: Readiness for Transition of Care  Outcome: Ongoing, Progressing     Problem: Diabetic Ketoacidosis  Goal: Fluid and Electrolyte Balance with Absence of Ketosis  Outcome: Ongoing, Progressing     Problem: Diabetes Comorbidity  Goal: Blood Glucose Level Within Targeted Range  Outcome: Ongoing, Progressing     Problem: Skin Injury Risk Increased  Goal: Skin Health and Integrity  Outcome: Ongoing, Progressing     Problem: Fall Injury Risk  Goal: Absence of Fall and Fall-Related Injury  Outcome: Ongoing, Progressing     Problem: Device-Related Complication Risk (Hemodialysis)  Goal: Safe, Effective Therapy Delivery  Outcome: Ongoing, Progressing     Problem: Hemodynamic Instability (Hemodialysis)  Goal: Effective Tissue Perfusion  Outcome: Ongoing, Progressing     Problem: Infection (Hemodialysis)  Goal: Absence of Infection Signs and Symptoms  Outcome: Ongoing, Progressing       PT c/o SOB and unable to to eat. Stomach distended- MD notified and CT abdominal ordered.

## 2023-08-24 NOTE — PROGRESS NOTES
[facility] Medicine  Progress Note    Patient Name: Cosme Lewis  MRN: [id number]  Patient Class: IP- Inpatient   Admission Date: 8/19/2023  Length of Stay: 4 days  Attending Physician: Selina Gallagher MD  Primary Care Provider: Eliecer Ohara III, MD        Subjective:     Principal Problem:Bleeding from dialysis shunt        HPI:  No notes on file    Overview/Hospital Course:  Patient was evaluated for respiratory failure and volume overload in setting of concerns for AVF malfunction. Vascular performed fistulogram 08/20 for bleeding AVF, result showed no flow limiting stenosis from large pseudoaneurysms to mid AVF, no intervention was done as patient had strong palpable thrill. Reported with recurrent epistaxis. ENT consulted and packed with left nare absorbable packing and aquaphor to bilateral nares bid. He has intermittently required 5L oxygen despite HD. Patient then started to complain of nausea, abdominal distention and inability to eat. KUB showed obstruction.        Interval History: He has not been able to eat. Severe nausea and abdominal distention. On KUB, predominant gas within multiple small bowel loops and possibility of a distal small bowel obstruction     Review of Systems   Constitutional:  Positive for fatigue. Negative for fever.   Respiratory:  Positive for shortness of breath.    Cardiovascular:  Negative for leg swelling.   Gastrointestinal:  Positive for abdominal distention, abdominal pain and nausea. Negative for blood in stool, diarrhea and vomiting.   Musculoskeletal:  Negative for back pain.   Skin:  Negative for rash.     Objective:     Vital Signs (Most Recent):  Temp: 97.5 °F (36.4 °C) (08/24/23 1131)  Pulse: 64 (08/24/23 1446)  Resp: 16 (08/24/23 1131)  BP: (!) 155/81 (08/24/23 1131)  SpO2: 95 % (08/24/23 1131) Vital Signs (24h Range):  Temp:  [97.1 °F (36.2 °C)-98 °F (36.7 °C)] 97.5 °F (36.4 °C)  Pulse:  [60-72] 64  Resp:  [16-37] 16  SpO2:  [93 %-100 %] 95 %  BP:  (121-168)/(70-90) 155/81     Weight: 68.9 kg (151 lb 14.4 oz)  Body mass index is 22.43 kg/m².  No intake or output data in the 24 hours ending 08/24/23 1557      Physical Exam  Constitutional:       General: He is not in acute distress.  HENT:      Head: Normocephalic and atraumatic.      Nose: No congestion.   Eyes:      Extraocular Movements: Extraocular movements intact.   Cardiovascular:      Rate and Rhythm: Regular rhythm.   Pulmonary:      Breath sounds: Normal breath sounds.   Abdominal:      General: There is distension.      Tenderness: There is abdominal tenderness.   Musculoskeletal:         General: Normal range of motion.   Skin:     General: Skin is warm.   Neurological:      General: No focal deficit present.             Significant Labs: All pertinent labs within the past 24 hours have been reviewed.  BMP:   Recent Labs   Lab 08/24/23  0917   *   *   K 5.0   CL 97   CO2 23   BUN 35*   CREATININE 4.4*   CALCIUM 8.3*   MG 2.2     CBC:   Recent Labs   Lab 08/23/23  0454 08/23/23  1942 08/24/23  0917   WBC 5.94 4.30 5.50   HGB 9.2* 8.2* 8.5*   HCT 29.6* 26.0* 27.3*    161 163       Significant Imaging: I have reviewed all pertinent imaging results/findings within the past 24 hours.      Assessment/Plan:      * Bleeding from dialysis shunt  -S/p fistulogram, no flow stenosis due to large PSA. No intervention by vascular surgery, large palpable thrill present      Small bowel obstruction  - suspect slow flow from volume overload, less likely mechanical  - KUB shows obstruction  - keep NPO and try NGT  - surgery consult       Hyperglycemia due to type 2 diabetes mellitus  Patient's FSGs are controlled on current medication regimen.  Last A1c reviewed-   Lab Results   Component Value Date    HGBA1C 10.7 (H) 07/18/2023     Most recent fingerstick glucose reviewed-   Recent Labs   Lab 08/23/23  1638 08/24/23  0720 08/24/23  1130   POCTGLUCOSE 77 162* 165*     Current correctional scale   Low  Maintain anti-hyperglycemic dose as follows-   Antihyperglycemics (From admission, onward)    Start     Stop Route Frequency Ordered    08/22/23 2100  insulin detemir U-100 (Levemir) pen 5 Units         -- SubQ 2 times daily 08/22/23 0953    08/22/23 1051  insulin aspart U-100 pen 1-10 Units         -- SubQ Before meals & nightly PRN 08/22/23 0953        Hold Oral hypoglycemics while patient is in the hospital.    Acute on chronic respiratory failure with hypoxia and hypercapnia  Patient with Hypoxic Respiratory failure which is Acute.  he is not on home oxygen. Supplemental oxygen was provided and noted-      .   Signs/symptoms of respiratory failure include- use of accessory muscles. Contributing diagnoses includes - CHF Labs and images were reviewed. Patient Has not had a recent ABG. Will treat underlying causes and adjust management of respiratory failure as follows- volume management by dialysis    Epistaxis  - ENT eval: s/p left nare packing and Afrin to bilateral nares q8h x 48h  - humidified Oxygen if necessary  - aquaphor to bilateral nares bid  - may place mustache dressing under nose for trickling  - apply Afrin to affected nasal cavity if epistaxis recurs        VTE Risk Mitigation (From admission, onward)         Ordered     apixaban tablet 5 mg  2 times daily         08/22/23 0731     IP VTE HIGH RISK PATIENT  Once         08/20/23 0036     Place sequential compression device  Until discontinued         08/20/23 0030                Discharge Planning   GERA: 8/25/2023     Code Status: Full Code   Is the patient medically ready for discharge?: No    Reason for patient still in hospital (select all that apply): Treatment  Discharge Plan A: Return to long-term ([facility])                  Selina Gallagher MD  Department of Hospital Medicine   [facility] Surg

## 2023-08-24 NOTE — ASSESSMENT & PLAN NOTE
Patient with Hypoxic Respiratory failure which is Acute.  he is not on home oxygen. Supplemental oxygen was provided and noted-      .   Signs/symptoms of respiratory failure include- use of accessory muscles. Contributing diagnoses includes - CHF Labs and images were reviewed. Patient Has not had a recent ABG. Will treat underlying causes and adjust management of respiratory failure as follows- volume management by dialysis

## 2023-08-24 NOTE — HPI
58 y.o M pt w/ hx of CHF, ESRD on HD MWF, history of PE on eliquis, HTN, DM admitted for respiratory failure and volume overload in the setting of AVF malfunction. Vascular surgery performed a fistulogram on 8/20 for bleeding AVF that showed no stenosis or clot so required no further intervention. General surgery was consulted for increasing abdominal distention and emesis this am for concerns of an SBO. A KUB was done which showed multiple loops of distended bowel.   Pt reports that he was passing flatus and had a BM this am, but then began feeling nauseous and vomiting which was green. He notes increasing abdominal distention, but denies pain. Denies flatus/BM since this am. Denies history of prior abdominal surgery. Denies fever/chills, chest pain, SOB.

## 2023-08-24 NOTE — SUBJECTIVE & OBJECTIVE
No current facility-administered medications on file prior to encounter.     Current Outpatient Medications on File Prior to Encounter   Medication Sig    acetaminophen (TYLENOL) 650 MG TbSR Take 650 mg by mouth every 8 (eight) hours as needed (pain or fever over 100.4).    albuterol (PROVENTIL/VENTOLIN HFA) 90 mcg/actuation inhaler Inhale 2 puffs into the lungs 4 (four) times daily as needed (breathing).    albuterol-ipratropium (DUO-NEB) 2.5 mg-0.5 mg/3 mL nebulizer solution Take 3 mLs by nebulization every 4 (four) hours while awake. Rescue    apixaban (ELIQUIS) 5 mg Tab Take 5 mg by mouth 2 (two) times daily.    artificial tears (ISOPTO TEARS) 0.5 % ophthalmic solution Place 1 drop into both eyes 6 (six) times daily.    aspirin (ECOTRIN) 81 MG EC tablet Take 81 mg by mouth once daily.    atorvastatin (LIPITOR) 40 MG tablet Take 1 tablet (40 mg total) by mouth once daily. (Patient taking differently: Take 40 mg by mouth every evening.)    carvediloL (COREG) 6.25 MG tablet Take 3.125 mg by mouth 2 (two) times daily.    cetirizine (ZYRTEC) 10 MG tablet Take 10 mg by mouth daily as needed (itching).    cloNIDine 0.3 mg/24 hr td ptwk (CATAPRES) 0.3 mg/24 hr APPLY ONE PATCH TOPICALLY EVERY WEEK FOR HIGH BLOOD PRESSURE    epoetin xavier (PROCRIT) 10,000 unit/mL injection Inject 0.7 mLs (7,000 Units total) into the skin every Tues, Thurs, Sat.    FLUoxetine 40 MG capsule Take 40 mg by mouth once daily.    fluticasone-salmeterol diskus inhaler 250-50 mcg Inhale 1 puff into the lungs 2 (two) times daily.    GLUCAGON EMERGENCY KIT, HUMAN, 1 mg injection 1 mg into the muscle as needed if CBG < 70    HYDROPHILIC CREAM TOP Apply liberal amount to affected area twice daily for dry skin    isosorbide mononitrate (IMDUR) 30 MG 24 hr tablet Take 1 tablet (30 mg total) by mouth once daily.    lisinopriL (PRINIVIL,ZESTRIL) 40 MG tablet Take 1 tablet (40 mg total) by mouth every evening.    melatonin 3 mg TbDL Take 9 mg by mouth  nightly as needed (sleep).    NIFEdipine (PROCARDIA-XL) 60 MG (OSM) 24 hr tablet Take 1 tablet (60 mg total) by mouth 2 (two) times a day.    nut.tx.imp.renal fxn,lac-reduc (NEPRO CARB STEADY ORAL) Give 1 carton by mouth with each meal.    ondansetron (ZOFRAN) 8 MG tablet Take 1 tablet by mouth 3 (three) times daily as needed for Nausea.    sevelamer carbonate (RENVELA) 800 mg Tab Take 800 mg by mouth 3 (three) times daily.    sodium chloride (SALINE NASAL) 0.65 % nasal spray 1 spray by Nasal route as needed (dry nostril).    tiotropium bromide (SPIRIVA RESPIMAT) 2.5 mcg/actuation inhaler Inhale 2 puffs into the lungs Daily.    umeclidinium (INCRUSE ELLIPTA) 62.5 mcg/actuation inhalation capsule Inhale 62.5 mcg into the lungs once daily. Controller    vitamin renal formula, B-complex-vitamin c-folic acid, (NEPHROCAP) 1 mg Cap Take 1 capsule by mouth once daily.    white petrolatum (VASELINE) ointment Apply topically once daily. Apply small amount to both nostrils daily       Review of patient's allergies indicates:  No Known Allergies    Past Medical History:   Diagnosis Date    CHF (congestive heart failure)     Chronic kidney disease-mineral and bone disorder 10/12/2022    Chronic respiratory failure     COPD (chronic obstructive pulmonary disease)     Diabetes mellitus type 1     ESRD on dialysis     Hypertension     Hypervolemia 02/22/2023    Hyponatremia 03/04/2023    Other insomnia     Recurrent major depression     SOB (shortness of breath) 10/12/2022    Patient with history COPD treated with Ellipta the albuterol, fluticasone-salmeterol tachypneic in the ED saturating 94% and 6 L on nasal cannula, patient does not know how many  he uses it is in nursing home.    -duo nebs Q 4  Given that patient is a poor historian, and in the setting of left lower extremity edema and history of coagulopathy PE cannot be ruled out.  Will workup for possible infec    Unspecified lack of coordination      Past Surgical  History:   Procedure Laterality Date    AV FISTULA PLACEMENT      FISTULOGRAM Left 8/20/2023    Procedure: Fistulogram;  Surgeon: Duong Aceves MD;  Location: [facility] OR [address] [place] [address];  Service: Vascular;  Laterality: Left;  1.6 MIN  55.57 mGy  10.4137 Gycm2  24 ml dye  4 ml transradial flush    PHLEBOGRAPHY  8/20/2023    Procedure: VENOGRAM, CENTRAL;  Surgeon: Duong Aceves MD;  Location: [facility] OR [address] [place] [address];  Service: Vascular;;     Family History       Problem Relation (Age of Onset)    Diabetes Mother          Tobacco Use    Smoking status: Never    Smokeless tobacco: Never   Substance and Sexual Activity    Alcohol use: Not Currently    Drug use: Not on file    Sexual activity: Not Currently     ROS: a comprehensive review of systems was done that was negative unless otherwise stated in the HPI    Objective:     Vital Signs (Most Recent):  Temp: 97.8 °F (36.6 °C) (08/24/23 1622)  Pulse: 63 (08/24/23 1622)  Resp: 18 (08/24/23 1622)  BP: 110/64 (08/24/23 1622)  SpO2: 95 % (08/24/23 1622) Vital Signs (24h Range):  Temp:  [97.1 °F (36.2 °C)-98 °F (36.7 °C)] 97.8 °F (36.6 °C)  Pulse:  [60-72] 63  Resp:  [16-37] 18  SpO2:  [93 %-100 %] 95 %  BP: (110-168)/(64-90) 110/64     Weight: 68.9 kg (151 lb 14.4 oz)  Body mass index is 22.43 kg/m².     Physical Exam  Constitutional:       General: He is not in acute distress.     Appearance: He is not ill-appearing.   HENT:      Head: Normocephalic and atraumatic.      Nose: Nose normal.      Mouth/Throat:      Mouth: Mucous membranes are moist.      Pharynx: Oropharynx is clear.   Eyes:      Extraocular Movements: Extraocular movements intact.      Conjunctiva/sclera: Conjunctivae normal.   Cardiovascular:      Rate and Rhythm: Normal rate and regular rhythm.   Pulmonary:      Effort: Pulmonary effort is normal.   Abdominal:      General: Abdomen is flat. There is distension.      Palpations: Abdomen is soft.      Tenderness: There is abdominal tenderness (mildly diffusely  tender). There is no guarding or rebound.      Comments: Tympanic to percussion on upper abdomen   Musculoskeletal:         General: Normal range of motion.      Cervical back: Normal range of motion.   Skin:     General: Skin is warm and dry.      Capillary Refill: Capillary refill takes less than 2 seconds.   Neurological:      Mental Status: He is alert and oriented to person, place, and time.            I have reviewed all pertinent lab results within the past 24 hours.    Significant Diagnostics:  I have reviewed all pertinent imaging results/findings within the past 24 hours.

## 2023-08-24 NOTE — SIGNIFICANT EVENT
Notified by nursing that patient with increased o2 requirement from 3L to 5L     Also pt with complaint of nausea    Plan  -portable CXR ordered  -give zofran iv now  and adding compazine prn

## 2023-08-24 NOTE — ASSESSMENT & PLAN NOTE
-S/p fistulogram, no flow stenosis due to large PSA. No intervention by vascular surgery, large palpable thrill present

## 2023-08-24 NOTE — NURSING
1925: IV out upon assessment, pad saturated in blood. Pressure held, quick clot applied with coban pressure dressing. MD team notified and CBC obtained.   2015: Report called to ADELITA Barrios. Pt brought to 654 along with pt belongings. RN notified of pt in room.

## 2023-08-24 NOTE — HOSPITAL COURSE
4/28/2020    FLU/COVID-19 CLINIC EVALUATION    HPI Pt. Presents to the [facility] COVID-19 clinic for evaluation of productive cough, HA, throat irritation and need for COVID-19 testing.     SYMPTOMS:    Symptom duration, days:  [] 1   [] 2   [] 3   [] 4   [] 5   [] 6   [x] 7   [] 8   [] 9   [] 10   [] 11   [] 12   [] 13 [] 14 +      Symptom course:   [] Worsening     [x] Stable     [] Improving    [] Fevers  [] Symptom (not measured)  [] Measured (Result: )  [] Chills  [x] Cough  [] Coughing up blood  [x] Productive  [] Dry  [] Chest Congestion  [] Chest Tightness  [] Nasal Congestion  [] Runny Nose  [] Feeling short of breath  [] Fatigue  [] Chest pain  [x] Headaches  []Tolerable  [] Severe  [] Sore throat  [] Muscle aches  [] Nausea  [] Decreased appetite  [] Vomiting  []Unable to keep fluids down  [] Diarrhea  []Severe    [x] OTHER SYMPTOMS:  Itchy throat    RISK FACTORS:  [] Pregnant or possibly pregnant  [] Age over 61  [] Diabetes  [] Heart disease  [x] Asthma  [] COPD/Other chronic lung diseases  [] Active Cancer  [] On Chemotherapy  [] Taking oral steroids  [] History Lymphoma/Leukemia  [] Close contact with a lab confirmed COVID-19 patient within 14 days of symptom onset  [] History of travel from affected geographical areas within 14 days of symptom onset  [] Health Care Worker Exposure no symptoms  [] Health Care Worker Exposure symptomatic  Sickle cell trait    VITALS:  Vitals:    04/28/20 1417   Pulse: 63   Temp: 98.2 °F (36.8 °C)   SpO2: 98%        PHYSICAL EXAMINATION:  [x]Alert   [x]Oriented to person/place/time    [x]No apparent distress     []Toxic appearing    [x]Breathing appears normal     []Appears tachypneic         [x]Speaking in full sentences     TESTS:  POCT FLU:  [] Positive     []Negative  POCT STREP:  [] Positive     []Negative    [x] COVID-19 Test sent: [x] Blue   [] Red   [] Chest X-ray     ASSESSMENT:  [] Flu  [] Strep Throat  [] Uncertain Viral Respiratory Illness  [x] Possible COVID-19  [] 57 y.o. male with CHF, ESRD on HD MWF, history of PE on eliquis, HTN, DM presents to the hospital with a chief complaint of inability to dialyze. The patient has a LUE fistula that was noted to have some bleeding after dialysis. He was unable to dialyze on the next session secondary to bleeding/oozing with no intervention. He was sent to the ER this for worsening shortness of breath and persistent bleeding at the AV fistula site. In the ER he was noted to be hypoxemic, mildly hypercapnic with a slow bleed form the AV fistula at the previous areas of access. ER placed quick clot and pressure with eventual stopping of the bleed. Vascular surgery was consulted who planned to take him to the OR in the morning for fistula revision as he has developed significant pseudoaneurysm. He was on 10L NC saturating well without an immediate need for urgent dialysis.  ICU team was consulted for acute hypoxemic respiratory failure in the setting of severe volume overload and in setting of concerns for AVF malfunction. Vascular performed fistulogram 08/20 for bleeding AVF, result showed no flow limiting stenosis from large pseudoaneurysms to mid AVF, no intervention was done as patient had strong palpable thrill. Reported with recurrent epistaxis. ENT consulted and packed with left nare absorbable packing and aquaphor to bilateral nares bid. He has intermittently required 5L oxygen despite HD. Patient then started to complain of nausea, abdominal distention and inability to eat. KUB showed obstruction and CT revealed multiple signs suggestive of significant fluid overload bilateral pleural effusions, large volume ascites, mesenteric edema, anasarca, and dilation of the IVC findings may represent slow transit, partial obstruction, diffuse enteritis, or may be related to fluid overload. Bowel function had improved and he was tolerating diet. However due to his large volume ascites, paracentesis was ordered and 4300cc of transudate  fluid. He clinically improved and was discharged back to the NH. He would follow up with GI out-patient.

## 2023-08-24 NOTE — ASSESSMENT & PLAN NOTE
58 y.o M pt w/ hx of CHF, ESRD on HD MWF, history of PE on eliquis, HTN, DM here for AVF malfunction. General surgery consulted for possible small bowel obstruction. Increasing distention and bilious vomiting concerning for possible obstruction, ileus, or malrotation.    -NPO  -Place NGT for decompression  -Recommend CT abd/pelvis to evaluate for SBO vs ileus vs malrotation  -Thank you for the consult. Please call general surgery with any questions or concerns.

## 2023-08-24 NOTE — NURSING
Pt complaining of SOB , O2 bumped from 3 to 5L on nasal cannula. Pt stated relief. O2 95%. Pt also complained of NV. Dr. Steen notified, CXR ordered. Will continue to monitor.

## 2023-08-24 NOTE — SUBJECTIVE & OBJECTIVE
Interval History: He has not been able to eat. Severe nausea and abdominal distention. On KUB, predominant gas within multiple small bowel loops and possibility of a distal small bowel obstruction     Review of Systems   Constitutional:  Positive for fatigue. Negative for fever.   Respiratory:  Positive for shortness of breath.    Cardiovascular:  Negative for leg swelling.   Gastrointestinal:  Positive for abdominal distention, abdominal pain and nausea. Negative for blood in stool, diarrhea and vomiting.   Musculoskeletal:  Negative for back pain.   Skin:  Negative for rash.     Objective:     Vital Signs (Most Recent):  Temp: 97.5 °F (36.4 °C) (08/24/23 1131)  Pulse: 64 (08/24/23 1446)  Resp: 16 (08/24/23 1131)  BP: (!) 155/81 (08/24/23 1131)  SpO2: 95 % (08/24/23 1131) Vital Signs (24h Range):  Temp:  [97.1 °F (36.2 °C)-98 °F (36.7 °C)] 97.5 °F (36.4 °C)  Pulse:  [60-72] 64  Resp:  [16-37] 16  SpO2:  [93 %-100 %] 95 %  BP: (121-168)/(70-90) 155/81     Weight: 68.9 kg (151 lb 14.4 oz)  Body mass index is 22.43 kg/m².  No intake or output data in the 24 hours ending 08/24/23 1557      Physical Exam  Constitutional:       General: He is not in acute distress.  HENT:      Head: Normocephalic and atraumatic.      Nose: No congestion.   Eyes:      Extraocular Movements: Extraocular movements intact.   Cardiovascular:      Rate and Rhythm: Regular rhythm.   Pulmonary:      Breath sounds: Normal breath sounds.   Abdominal:      General: There is distension.      Tenderness: There is abdominal tenderness.   Musculoskeletal:         General: Normal range of motion.   Skin:     General: Skin is warm.   Neurological:      General: No focal deficit present.             Significant Labs: All pertinent labs within the past 24 hours have been reviewed.  BMP:   Recent Labs   Lab 08/24/23  0917   *   *   K 5.0   CL 97   CO2 23   BUN 35*   CREATININE 4.4*   CALCIUM 8.3*   MG 2.2     CBC:   Recent Labs   Lab  08/23/23  0454 08/23/23  1942 08/24/23  0917   WBC 5.94 4.30 5.50   HGB 9.2* 8.2* 8.5*   HCT 29.6* 26.0* 27.3*    161 163       Significant Imaging: I have reviewed all pertinent imaging results/findings within the past 24 hours.

## 2023-08-24 NOTE — RESIDENT HANDOFF
Handoff     Primary Team: [facility] MED W Room Number: 654/654 A     Patient Name: Cosme Lewis MRN: [id number]     Date of Birth: [id number] Allergies: Patient has no known allergies.     Age: 58 y.o. Admit Date: 8/19/2023     Sex: male  BMI: Body mass index is 22.43 kg/m².     Code Status: Full Code        Illness Level (current clinical status): Watcher - No    Reason for Admission: Bleeding from dialysis shunt    Brief HPI (Mr. Lewis is a 58 y.o M pt w/ hx of CHF, ESRD on HD MWF, history of PE on eliquis, HTN, DM here with concerns for respiratory failure and volume overload in setting of concerns for AVF malfunction.     -AVF: Vascular surgery evaluated and AVF without acute concerns, Nephrology initiated HD.  -Respiratory Failure: On home O2(2L via NC).   -Epistaxis: hx of recurrent epistaxis. ENT consulted. See consult note dated 08/21/23  -HTN-Resumed home meds and increased Carvedilol dosage  -T2DM-Elevated glucose on admission requiring insulin infusion. Now stable on scheduled insulin. Consider insulin Rx on discharge      Tasks:   - Contact Vascular Surgery if concerns for AVF bleeding concerns  -T2DM- onsider insulin Rx on discharge  -HTN: monitor for BP control   -Epistaxis:  ENT consulted. See consult note dated 08/21/23 for management and discharge instructions    -Mentored By: Dr. Chente Newell

## 2023-08-25 PROBLEM — R14.0 ABDOMINAL DISTENSION: Status: ACTIVE | Noted: 2023-01-01

## 2023-08-25 NOTE — PROGRESS NOTES
3.5 hour dialysis complete.  Blood returned.  Mar[place] pulled from UMA fistula.   Pressure held x 10 minutes.  Hemostasis achieved.  +thrill +bruit    Net UF 2.5L.  Tolerated well.    Drank 3 juices during dialysis with no nausea or abdominal pain.    Transported from [facility] to room 654 via stretcher by transporter.

## 2023-08-25 NOTE — ASSESSMENT & PLAN NOTE
- slow transit, partial obstruction, may be related to fluid overload as seen on CT  - large volume ascites, mesenteric edema, anasarca, and dilation of the IVC  - paracentesis

## 2023-08-25 NOTE — PHYSICIAN QUERY
PT Name: Cosme Lewis  MR #: [id number]     DOCUMENTATION CLARIFICATION     CDS/: RASHMI Kebede, RN, CCDS               Contact information: [email or web address]  This form is a permanent document in the medical record.     Query Date: August 25, 2023    By submitting this query, we are merely seeking further clarification of documentation.  Please utilize your independent clinical judgment when addressing the question(s) below.    The Medical Record contains the following   Indicators Supporting Clinical Findings Location in Medical Record   X Heart Failure documented HFrEF    Chronic diastolic heart failure   H & P: Dr. Lezama 8/20    CC: Dr. Newell 8/23   X BNP 4568   Lab: 8/19   X EF/Echo normal systolic function.  The estimated ejection fraction is 55%.  Grade II left ventricular diastolic dysfunction   Echo: 3/20/23 (prev admit)   X Radiology findings lungs are symmetrically expanded with diffuse increased interstitial and parenchymal attenuation and patchy bilateral opacities suggestive of pulmonary edema although findings can be seen with underlying infectious or non-infectious inflammatory etiologies   CXR: 8/19   X Subjective/Objective Respiratory Conditions SOB with new O2 requirements     worsening shortness of breath   acute hypoxemic respiratory failure in the setting of severe volume overload and inability to dialyze through his current access.    Vasc surg: Dr. Aceves 8/19    H & P: Dr. Lezama 8/20      Recent/Current MI      Heart Transplant, LVAD     X Edema, JVD 1+ pitting edema to BLE localized to shin    H & P: Dr. Lezama 8/20    Ascites     X Diuretics/Meds furosemide injection 120 mg  Dose: 120 mg  Freq: ED 1 Time Route: IV  Start: 08/20/23 0000 End: 08/20/23 0136 MAR    Other Treatment      Other       Heart failure is a clinical diagnosis which includes symptomatic fluid retention, elevated intracardiac pressures, and/or the inability of the heart to deliver adequate blood  flow.    Heart Failure with reduced Ejection Fraction (HFrEF) or Systolic Heart Failure (loses ability to contract normally, EF is <40%)    Heart Failure with preserved Ejection Fraction (HFpEF) or Diastolic Heart Failure (stiff ventricles, does not relax properly, EF is >50%)     Heart Failure with Combined Systolic and Diastolic Failure (stiff ventricles, does not relax properly and EF is <50%)    Mid-range or mildly reduced ejection fraction (HFmrEF) is classified as systolic heart failure.  Congestive heart failure with a recovered EF is classified as Diastolic Heart Failure.  Common clues to acute exacerbation:  Rapidly progressive symptoms (w/in 2 weeks of presentation), using IV diuretics, using supplemental O2, pulmonary edema on Xray, new or worsening pleural effusion, +JVD or other signs of volume overload, MI w/in 4 weeks, and/or BNP >500  The clinical guidelines noted are only system guidelines, and do not replace the providers clinical judgment.    Provider, please clarify type/acuity of CHF associated with the above clinical findings.    [   ]  Acute on Chronic Systolic Heart Failure (HFrEF or HFmrEF) - worsening of CHF signs/symptoms in preexisting CHF   [   ]  Chronic Systolic Heart Failure (HFrEF or HFmrEF) - preexisting and stable   [ X  ]  Acute on Chronic Diastolic Heart Failure (HFpEF) - worsening of CHF signs/symptoms in preexisting CHF   [   ]  Chronic Diastolic Heart Failure (HFpEF) - preexisting and stable   [   ]  Other (please specify): ___________________________________       Please document in your progress notes daily for the duration of treatment until resolved and include in your discharge summary.    References:  American Heart Association editorial staff. (2017, May). Ejection Fraction Heart Failure Measurement. American Heart Association.  https://www.heart.org/en/health-topics/heart-failure/diagnosing-heart-failure/ejection-fraction-heart-failure-measurement#:~:text=Ejection%20fraction%20(EF)%20is%20a,pushed%20out%20with%20each%20heartbeat  ALBERTO Mendoza (2020, December 15). Heart failure with preserved ejection fraction: Clinical manifestations and diagnosis. [employer]. https://www.[employer]/contents/heart-failure-with-preserved-ejection-fraction-clinical-manifestations-and-diagnosis.  ICD-10-CM/PCS Coding Clinic Third Quarter ICD-10, Effective with discharges: September 8, 2020 [facility] § Heart failure with mid-range or mildly reduced ejection fraction (2020).  ICD-10-CM/PCS Coding Clinic Third Quarter ICD-10, Effective with discharges: September 8, 2020 [facility] § Heart failure with recovered ejection fraction (2020).  Form No. 73496

## 2023-08-25 NOTE — PLAN OF CARE
[facility]  Discharge Reassessment    Primary Care Provider: Eliecer Ohara III, MD    Expected Discharge Date: 8/28/2023    Reassessment (most recent)       Discharge Reassessment - 08/25/23 1056          Discharge Reassessment    Assessment Type Discharge Planning Reassessment     Did the patient's condition or plan change since previous assessment? No     Discharge Plan discussed with: Patient     Communicated GERA with patient/caregiver Yes     Discharge Plan B Return to Nursing Home;Skilled Nursing Facility     DME Needed Upon Discharge  none     Transition of Care Barriers None     Why the patient remains in the hospital Requires continued medical care        Post-Acute Status    Post-Acute Authorization Placement     Post-Acute Placement Status Referrals Sent     Hospital Resources/Appts/Education Provided Appointments scheduled and added to AVS;Post-Acute resouces added to AVS     Discharge Delays None known at this time

## 2023-08-25 NOTE — PLAN OF CARE
08/25/23 1055   Post-Acute Status   Post-Acute Authorization Placement   Post-Acute Placement Status Referrals Sent   Hospital Resources/Appts/Education Provided Appointments scheduled and added to AVS;Post-Acute resouces added to AVS   Discharge Delays None known at this time   Discharge Plan   Discharge Plan A Return to nursing home   Discharge Plan B Return to Nursing Home;Skilled Nursing Facility

## 2023-08-25 NOTE — PROGRESS NOTES
[facility] Medicine  Progress Note    Patient Name: Cosme Lewis  MRN: [id number]  Patient Class: IP- Inpatient   Admission Date: 8/19/2023  Length of Stay: 5 days  Attending Physician: Selina Gallagher MD  Primary Care Provider: Eliecer Ohara III, MD        Subjective:     Principal Problem:Bleeding from dialysis shunt        HPI:  No notes on file    Overview/Hospital Course:  Patient was evaluated for respiratory failure and volume overload in setting of concerns for AVF malfunction. Vascular performed fistulogram 08/20 for bleeding AVF, result showed no flow limiting stenosis from large pseudoaneurysms to mid AVF, no intervention was done as patient had strong palpable thrill. Reported with recurrent epistaxis. ENT consulted and packed with left nare absorbable packing and aquaphor to bilateral nares bid. He has intermittently required 5L oxygen despite HD. Patient then started to complain of nausea, abdominal distention and inability to eat. KUB showed obstruction and CT revealed multiple signs suggestive of significant fluid overload bilateral pleural effusions, large volume ascites, mesenteric edema, anasarca, and dilation of the IVC findings may represent slow transit, partial obstruction, diffuse enteritis, or may be related to fluid overload.        Interval History: Patient has nausea and no more vomiting. CT showed large volume ascites, mesenteric edema, anasarca, no discrete transition point to suggest high-grade mechanical obstruction.  Findings may represent slow transit, partial obstruction, diffuse enteritis, or may be related to fluid overload.    Review of Systems   Constitutional:  Positive for fatigue. Negative for fever.   Respiratory:  Positive for shortness of breath.    Cardiovascular:  Negative for leg swelling.   Gastrointestinal:  Positive for abdominal distention, abdominal pain and nausea. Negative for blood in stool, diarrhea and vomiting.   Musculoskeletal:  Negative  for back pain.   Skin:  Negative for rash.     Objective:     Vital Signs (Most Recent):  Temp: 97.9 °F (36.6 °C) (08/25/23 1032)  Pulse: (!) 58 (08/25/23 1415)  Resp: 16 (08/25/23 1032)  BP: 118/61 (08/25/23 1415)  SpO2: 99 % (08/25/23 1415) Vital Signs (24h Range):  Temp:  [97.5 °F (36.4 °C)-98.2 °F (36.8 °C)] 97.9 °F (36.6 °C)  Pulse:  [57-66] 58  Resp:  [16-20] 16  SpO2:  [90 %-100 %] 99 %  BP: ()/(59-80) 118/61     Weight: 68.9 kg (151 lb 14.4 oz)  Body mass index is 22.43 kg/m².  No intake or output data in the 24 hours ending 08/25/23 1458      Physical Exam  Constitutional:       General: He is not in acute distress.  HENT:      Head: Normocephalic and atraumatic.      Nose: No congestion.   Eyes:      Extraocular Movements: Extraocular movements intact.   Cardiovascular:      Rate and Rhythm: Regular rhythm.   Pulmonary:      Breath sounds: Normal breath sounds.   Abdominal:      General: There is distension.      Tenderness: There is abdominal tenderness.   Musculoskeletal:         General: Normal range of motion.   Skin:     General: Skin is warm.   Neurological:      General: No focal deficit present.             Significant Labs: All pertinent labs within the past 24 hours have been reviewed.  BMP:   Recent Labs   Lab 08/25/23  0302      *   K 5.0   CL 97   CO2 22*   BUN 42*   CREATININE 4.8*   CALCIUM 8.2*   MG 2.3     CBC:   Recent Labs   Lab 08/23/23  1942 08/24/23  0917 08/25/23  0302   WBC 4.30 5.50 4.14   HGB 8.2* 8.5* 8.5*   HCT 26.0* 27.3* 27.5*    163 175       Significant Imaging: I have reviewed all pertinent imaging results/findings within the past 24 hours.      Assessment/Plan:      * Bleeding from dialysis shunt  -S/p fistulogram, no flow stenosis due to large PSA. No intervention by vascular surgery, large palpable thrill present      Small bowel obstruction  - slow transit, partial obstruction, may be related to fluid overload as seen on CT  - large volume  ascites, mesenteric edema, anasarca, and dilation of the IVC  - paracentesis    Hyperglycemia due to type 2 diabetes mellitus  Patient's FSGs are controlled on current medication regimen.  Last A1c reviewed-   Lab Results   Component Value Date    HGBA1C 10.7 (H) 07/18/2023     Most recent fingerstick glucose reviewed-   Recent Labs   Lab 08/23/23  1638 08/24/23  0720 08/24/23  1130   POCTGLUCOSE 77 162* 165*     Current correctional scale  Low  Maintain anti-hyperglycemic dose as follows-   Antihyperglycemics (From admission, onward)    Start     Stop Route Frequency Ordered    08/22/23 2100  insulin detemir U-100 (Levemir) pen 5 Units         -- SubQ 2 times daily 08/22/23 0953    08/22/23 1051  insulin aspart U-100 pen 1-10 Units         -- SubQ Before meals & nightly PRN 08/22/23 0953        Hold Oral hypoglycemics while patient is in the hospital.    Acute on chronic respiratory failure with hypoxia and hypercapnia  Patient with Hypoxic Respiratory failure which is Acute.  he is not on home oxygen. Supplemental oxygen was provided and noted- Oxygen Concentration (%):  [32] 32    .   Signs/symptoms of respiratory failure include- use of accessory muscles. Contributing diagnoses includes - CHF Labs and images were reviewed. Patient Has not had a recent ABG. Will treat underlying causes and adjust management of respiratory failure as follows- volume management by dialysis    Epistaxis  - ENT eval: s/p left nare packing and Afrin to bilateral nares q8h x 48h  - humidified Oxygen if necessary  - aquaphor to bilateral nares bid  - may place mustache dressing under nose for trickling  - apply Afrin to affected nasal cavity if epistaxis recurs      ESRD (end stage renal disease)  - Nephrology consulting         VTE Risk Mitigation (From admission, onward)         Ordered     apixaban tablet 5 mg  2 times daily         08/22/23 0731     IP VTE HIGH RISK PATIENT  Once         08/20/23 0036     Place sequential  compression device  Until discontinued         08/20/23 0030                Discharge Planning   GERA: 8/28/2023     Code Status: Full Code   Is the patient medically ready for discharge?: No    Reason for patient still in hospital (select all that apply): Treatment  Discharge Plan A: Return to nursing home   Discharge Delays: None known at this time              Selina Gallagher MD  Department of Hospital Medicine   [facility] Surg

## 2023-08-25 NOTE — CARE UPDATE
Care Update: Acute Care Surgery    Patient seen this afternoon during hemodialysis session. He reported passing flatus today around lunchtime, has not had bowel movement since yesterday 8/24 evening. Per nurse, has also drank 2 juices without nausea or vomiting. He is requesting to eat more substantial food. Abdomen is less distended on exam, no tenderness to palpation. Patient has had return of bowel function, no concern for SBO or ileus at this time. General surgery will sign off, please do not hesitate to reach out with any concerns or questions. Thank you for this consult.    Elisha Duncan M.D.  General Surgery PGY-II  [facility]

## 2023-08-25 NOTE — PLAN OF CARE
No significant changes this shift. Continue with POC and monitor. Pt left lying semi fowlers bed in lowest position, with call light in reach, and all wheels locked X3 rails up.    Problem: Adult Inpatient Plan of Care  Goal: Plan of Care Review  Outcome: Ongoing, Progressing  Goal: Patient-Specific Goal (Individualized)  Outcome: Ongoing, Progressing  Goal: Absence of Hospital-Acquired Illness or Injury  Outcome: Ongoing, Progressing  Goal: Optimal Comfort and Wellbeing  Outcome: Ongoing, Progressing  Goal: Readiness for Transition of Care  Outcome: Ongoing, Progressing     Problem: Diabetic Ketoacidosis  Goal: Fluid and Electrolyte Balance with Absence of Ketosis  Outcome: Ongoing, Progressing     Problem: Diabetes Comorbidity  Goal: Blood Glucose Level Within Targeted Range  Outcome: Ongoing, Progressing     Problem: Skin Injury Risk Increased  Goal: Skin Health and Integrity  Outcome: Ongoing, Progressing     Problem: Fall Injury Risk  Goal: Absence of Fall and Fall-Related Injury  Outcome: Ongoing, Progressing     Problem: Device-Related Complication Risk (Hemodialysis)  Goal: Safe, Effective Therapy Delivery  Outcome: Ongoing, Progressing     Problem: Hemodynamic Instability (Hemodialysis)  Goal: Effective Tissue Perfusion  Outcome: Ongoing, Progressing     Problem: Infection (Hemodialysis)  Goal: Absence of Infection Signs and Symptoms  Outcome: Ongoing, Progressing

## 2023-08-25 NOTE — SUBJECTIVE & OBJECTIVE
Interval History: Patient has nausea and no more vomiting. CT showed large volume ascites, mesenteric edema, anasarca, no discrete transition point to suggest high-grade mechanical obstruction.  Findings may represent slow transit, partial obstruction, diffuse enteritis, or may be related to fluid overload.    Review of Systems   Constitutional:  Positive for fatigue. Negative for fever.   Respiratory:  Positive for shortness of breath.    Cardiovascular:  Negative for leg swelling.   Gastrointestinal:  Positive for abdominal distention, abdominal pain and nausea. Negative for blood in stool, diarrhea and vomiting.   Musculoskeletal:  Negative for back pain.   Skin:  Negative for rash.     Objective:     Vital Signs (Most Recent):  Temp: 97.9 °F (36.6 °C) (08/25/23 1032)  Pulse: (!) 58 (08/25/23 1415)  Resp: 16 (08/25/23 1032)  BP: 118/61 (08/25/23 1415)  SpO2: 99 % (08/25/23 1415) Vital Signs (24h Range):  Temp:  [97.5 °F (36.4 °C)-98.2 °F (36.8 °C)] 97.9 °F (36.6 °C)  Pulse:  [57-66] 58  Resp:  [16-20] 16  SpO2:  [90 %-100 %] 99 %  BP: ()/(59-80) 118/61     Weight: 68.9 kg (151 lb 14.4 oz)  Body mass index is 22.43 kg/m².  No intake or output data in the 24 hours ending 08/25/23 1458      Physical Exam  Constitutional:       General: He is not in acute distress.  HENT:      Head: Normocephalic and atraumatic.      Nose: No congestion.   Eyes:      Extraocular Movements: Extraocular movements intact.   Cardiovascular:      Rate and Rhythm: Regular rhythm.   Pulmonary:      Breath sounds: Normal breath sounds.   Abdominal:      General: There is distension.      Tenderness: There is abdominal tenderness.   Musculoskeletal:         General: Normal range of motion.   Skin:     General: Skin is warm.   Neurological:      General: No focal deficit present.             Significant Labs: All pertinent labs within the past 24 hours have been reviewed.  BMP:   Recent Labs   Lab 08/25/23  0302      *   K  5.0   CL 97   CO2 22*   BUN 42*   CREATININE 4.8*   CALCIUM 8.2*   MG 2.3     CBC:   Recent Labs   Lab 08/23/23  1942 08/24/23  0917 08/25/23  0302   WBC 4.30 5.50 4.14   HGB 8.2* 8.5* 8.5*   HCT 26.0* 27.3* 27.5*    163 175       Significant Imaging: I have reviewed all pertinent imaging results/findings within the past 24 hours.

## 2023-08-25 NOTE — ASSESSMENT & PLAN NOTE
Patient with Hypoxic Respiratory failure which is Acute.  he is not on home oxygen. Supplemental oxygen was provided and noted- Oxygen Concentration (%):  [32] 32    .   Signs/symptoms of respiratory failure include- use of accessory muscles. Contributing diagnoses includes - CHF Labs and images were reviewed. Patient Has not had a recent ABG. Will treat underlying causes and adjust management of respiratory failure as follows- volume management by dialysis

## 2023-08-25 NOTE — CONSULTS
IR consulted for a paracentesis. Ordering team to place paracentesis order and any studies to send off.     Aubrie Patel PA-C  Interventional Radiology  Spectra: [phone]  8/25/2023

## 2023-08-25 NOTE — NURSING
Pt refused NG tube placement. Night nurse attempted twice, once in each jensen. Pt is refusing placement at this time, MD notified. No new orders at this time.

## 2023-08-25 NOTE — PROGRESS NOTES
[facility] NOTE     Patient currently on hemodialysis for removal of uremic toxins .     Patient seen and evaluated on hemodialysis, tolerating treatment, see HD flowsheet for vitals and assessments.      No Hypotension, chest pain, shortness of breath, cramping, nausea or vomiting.     Target UF: 2.5 L as tolerated, keep MAP >65.  Anemia: Hgb 8.5, will plan for Epogen with HD  MBD: Continue Sevelamer.     SHANTELL Grant DNP, APRN, FNP-C  Department of Nephrology  [facility]  Pager: [phone]

## 2023-08-26 NOTE — PROGRESS NOTES
[facility] Medicine  Progress Note    Patient Name: Cosme Lewis  MRN: [id number]  Patient Class: IP- Inpatient   Admission Date: 8/19/2023  Length of Stay: 6 days  Attending Physician: Selina Gallagher MD  Primary Care Provider: Eliecer Ohara III, MD        Subjective:     Principal Problem:Bleeding from dialysis shunt        HPI:  No notes on file    Overview/Hospital Course:  Patient was evaluated for respiratory failure and volume overload in setting of concerns for AVF malfunction. Vascular performed fistulogram 08/20 for bleeding AVF, result showed no flow limiting stenosis from large pseudoaneurysms to mid AVF, no intervention was done as patient had strong palpable thrill. Reported with recurrent epistaxis. ENT consulted and packed with left nare absorbable packing and aquaphor to bilateral nares bid. He has intermittently required 5L oxygen despite HD. Patient then started to complain of nausea, abdominal distention and inability to eat. KUB showed obstruction and CT revealed multiple signs suggestive of significant fluid overload bilateral pleural effusions, large volume ascites, mesenteric edema, anasarca, and dilation of the IVC findings may represent slow transit, partial obstruction, diffuse enteritis, or may be related to fluid overload.        Interval History: He bleed from HD site last night. Paracentesis has not yet been done.     Review of Systems   Constitutional:  Positive for fatigue. Negative for fever.   Respiratory:  Positive for shortness of breath.    Cardiovascular:  Negative for leg swelling.   Gastrointestinal:  Positive for abdominal distention and abdominal pain. Negative for blood in stool, diarrhea, nausea and vomiting.   Musculoskeletal:  Negative for back pain.   Skin:  Negative for rash.     Objective:     Vital Signs (Most Recent):  Temp: 97.4 °F (36.3 °C) (08/26/23 1031)  Pulse: 63 (08/26/23 1137)  Resp: 16 (08/26/23 1031)  BP: 104/69 (08/26/23 1031)  SpO2:  (!) 93 % (08/26/23 1031) Vital Signs (24h Range):  Temp:  [97.1 °F (36.2 °C)-99.2 °F (37.3 °C)] 97.4 °F (36.3 °C)  Pulse:  [59-72] 63  Resp:  [16-18] 16  SpO2:  [91 %-100 %] 93 %  BP: (104-122)/(58-69) 104/69     Weight: 68.9 kg (151 lb 14.4 oz)  Body mass index is 22.43 kg/m².    Intake/Output Summary (Last 24 hours) at 8/26/2023 1531  Last data filed at 8/26/2023 0855  Gross per 24 hour   Intake 1450.02 ml   Output 3150 ml   Net -1699.98 ml         Physical Exam  Constitutional:       General: He is not in acute distress.  HENT:      Head: Normocephalic and atraumatic.      Nose: No congestion.   Eyes:      Extraocular Movements: Extraocular movements intact.   Cardiovascular:      Rate and Rhythm: Regular rhythm.   Pulmonary:      Breath sounds: Normal breath sounds.   Abdominal:      General: There is distension.      Tenderness: There is abdominal tenderness.   Musculoskeletal:         General: Normal range of motion.   Skin:     General: Skin is warm.   Neurological:      General: No focal deficit present.             Significant Labs: All pertinent labs within the past 24 hours have been reviewed.  BMP:   Recent Labs   Lab 08/26/23  0239   *      K 4.5      CO2 25   BUN 23*   CREATININE 3.1*   CALCIUM 8.3*   MG 2.1     CBC:   Recent Labs   Lab 08/25/23  0302 08/26/23  0239   WBC 4.14 3.09*   HGB 8.5* 8.7*   HCT 27.5* 28.0*    178       Significant Imaging: I have reviewed all pertinent imaging results/findings within the past 24 hours.      Assessment/Plan:      * Bleeding from dialysis shunt  -S/p fistulogram, no flow stenosis due to large PSA. No intervention by vascular surgery, large palpable thrill present  -Bleeding resolved with applying pressure       Small bowel obstruction  - slow transit, partial obstruction, may be related to fluid overload as seen on CT  - large volume ascites, mesenteric edema, anasarca, and dilation of the IVC  - paracentesis ordered    Hyperglycemia  due to type 2 diabetes mellitus  Patient's FSGs are controlled on current medication regimen.  Last A1c reviewed-   Lab Results   Component Value Date    HGBA1C 10.7 (H) 07/18/2023     Most recent fingerstick glucose reviewed-   Recent Labs   Lab 08/23/23  1638 08/24/23  0720 08/24/23  1130   POCTGLUCOSE 77 162* 165*     Current correctional scale  Low  Maintain anti-hyperglycemic dose as follows-   Antihyperglycemics (From admission, onward)    Start     Stop Route Frequency Ordered    08/22/23 2100  insulin detemir U-100 (Levemir) pen 5 Units         -- SubQ 2 times daily 08/22/23 0953    08/22/23 1051  insulin aspart U-100 pen 1-10 Units         -- SubQ Before meals & nightly PRN 08/22/23 0953        Hold Oral hypoglycemics while patient is in the hospital.    Acute on chronic respiratory failure with hypoxia and hypercapnia  Patient with Hypoxic Respiratory failure which is Acute.  he is not on home oxygen. Supplemental oxygen was provided and noted- Oxygen Concentration (%):  [32] 32    .   Signs/symptoms of respiratory failure include- use of accessory muscles. Contributing diagnoses includes - CHF Labs and images were reviewed. Patient Has not had a recent ABG. Will treat underlying causes and adjust management of respiratory failure as follows- volume management by dialysis    Epistaxis  - ENT eval: s/p left nare packing and Afrin q8h x 48h  - humidified Oxygen if necessary  - aquaphor to bilateral nares bid  - may place mustache dressing under nose for trickling  - apply Afrin to affected nasal cavity if epistaxis recurs      ESRD (end stage renal disease)  - Nephrology consulting         VTE Risk Mitigation (From admission, onward)         Ordered     apixaban tablet 5 mg  2 times daily         08/22/23 0731     IP VTE HIGH RISK PATIENT  Once         08/20/23 0036     Place sequential compression device  Until discontinued         08/20/23 0030                Discharge Planning   GERA: 8/28/2023     Code  Status: Full Code   Is the patient medically ready for discharge?: Yes    Reason for patient still in hospital (select all that apply): Treatment  Discharge Plan A: Return to nursing home   Discharge Delays: None known at this time              Selina Gallagher MD  Department of Hospital Medicine   [facility]

## 2023-08-26 NOTE — SUBJECTIVE & OBJECTIVE
Interval History: He bleed from HD site last night. Paracentesis has not yet been done.     Review of Systems   Constitutional:  Positive for fatigue. Negative for fever.   Respiratory:  Positive for shortness of breath.    Cardiovascular:  Negative for leg swelling.   Gastrointestinal:  Positive for abdominal distention and abdominal pain. Negative for blood in stool, diarrhea, nausea and vomiting.   Musculoskeletal:  Negative for back pain.   Skin:  Negative for rash.     Objective:     Vital Signs (Most Recent):  Temp: 97.4 °F (36.3 °C) (08/26/23 1031)  Pulse: 63 (08/26/23 1137)  Resp: 16 (08/26/23 1031)  BP: 104/69 (08/26/23 1031)  SpO2: (!) 93 % (08/26/23 1031) Vital Signs (24h Range):  Temp:  [97.1 °F (36.2 °C)-99.2 °F (37.3 °C)] 97.4 °F (36.3 °C)  Pulse:  [59-72] 63  Resp:  [16-18] 16  SpO2:  [91 %-100 %] 93 %  BP: (104-122)/(58-69) 104/69     Weight: 68.9 kg (151 lb 14.4 oz)  Body mass index is 22.43 kg/m².    Intake/Output Summary (Last 24 hours) at 8/26/2023 1531  Last data filed at 8/26/2023 0855  Gross per 24 hour   Intake 1450.02 ml   Output 3150 ml   Net -1699.98 ml         Physical Exam  Constitutional:       General: He is not in acute distress.  HENT:      Head: Normocephalic and atraumatic.      Nose: No congestion.   Eyes:      Extraocular Movements: Extraocular movements intact.   Cardiovascular:      Rate and Rhythm: Regular rhythm.   Pulmonary:      Breath sounds: Normal breath sounds.   Abdominal:      General: There is distension.      Tenderness: There is abdominal tenderness.   Musculoskeletal:         General: Normal range of motion.   Skin:     General: Skin is warm.   Neurological:      General: No focal deficit present.             Significant Labs: All pertinent labs within the past 24 hours have been reviewed.  BMP:   Recent Labs   Lab 08/26/23  0239   *      K 4.5      CO2 25   BUN 23*   CREATININE 3.1*   CALCIUM 8.3*   MG 2.1     CBC:   Recent Labs   Lab  08/25/23  0302 08/26/23  0239   WBC 4.14 3.09*   HGB 8.5* 8.7*   HCT 27.5* 28.0*    178       Significant Imaging: I have reviewed all pertinent imaging results/findings within the past 24 hours.

## 2023-08-26 NOTE — ASSESSMENT & PLAN NOTE
- slow transit, partial obstruction, may be related to fluid overload as seen on CT  - large volume ascites, mesenteric edema, anasarca, and dilation of the IVC  - paracentesis ordered

## 2023-08-26 NOTE — ASSESSMENT & PLAN NOTE
- ENT eval: s/p left nare packing and Afrin q8h x 48h  - humidified Oxygen if necessary  - aquaphor to bilateral nares bid  - may place mustache dressing under nose for trickling  - apply Afrin to affected nasal cavity if epistaxis recurs

## 2023-08-26 NOTE — ASSESSMENT & PLAN NOTE
-S/p fistulogram, no flow stenosis due to large PSA. No intervention by vascular surgery, large palpable thrill present  -Bleeding resolved with applying pressure

## 2023-08-27 NOTE — PLAN OF CARE
Pt is AAOx4. Pt remain free from fall and injuries. VS remain stable. Questions and concerns voiced and answered. Medication compliance. Pt did not have any episodes of epistaxis, but did bleed from AV shunt. Stopped Upon holding pressure. Pt  did pulled out his PIV and bleed minium  from there as well. Call light in reach. Bed in low locked position. Side rails x2. Belongings at bedside.

## 2023-08-27 NOTE — ASSESSMENT & PLAN NOTE
"Subjective:      Patient ID: Tricia Grande is a 8 m.o. female.    Vitals:  height is 2' 3" (0.686 m) and weight is 7.802 kg (17 lb 3.2 oz). Her temperature is 100.2 °F (37.9 °C). Her pulse is 120. Her respiration is 24 (abnormal) and oxygen saturation is 98%.     Chief Complaint: URI and Fever    Mother states pt has been having a fever to 102 , coughing, post nasal drip,  eye drainage ( she was seen yesterday in the ED for abd pain and was dx with reflux )  cold sx started yesterday     URI  This is a new problem. The current episode started yesterday. The problem occurs constantly. The problem has been rapidly worsening. Associated symptoms include congestion, coughing and a fever. Associated symptoms comments: Eye drainage . She has tried acetaminophen (motrin) for the symptoms.     Constitution: Positive for fever.   HENT:  Positive for congestion.    Respiratory:  Positive for cough.     Objective:     Physical Exam   Constitutional: She appears well-developed. She is active. No distress.   HENT:   Head: Normocephalic and atraumatic. Anterior fontanelle is flat. No hematoma. No signs of injury.   Ears:   Right Ear: Tympanic membrane and external ear normal.   Left Ear: External ear normal.      Comments: Left tympanic membrane erythema with effusion  Nose: Rhinorrhea and congestion present. No signs of injury.      Comments: Marked greenish nasal mucosal drainage bilaterally.  Mouth/Throat: Mucous membranes are moist. Oropharynx is clear.   Eyes: Conjunctivae and lids are normal. Red reflex is present bilaterally. Visual tracking is normal. Right eye exhibits no discharge. Left eye exhibits no discharge. No scleral icterus.      Comments: Pus exiting the lacrimal ducts bilaterally.  Mild conjunctival hyperemia bilaterally.   Neck: Trachea normal. Neck supple.   Cardiovascular: Normal rate and regular rhythm.   Pulmonary/Chest: Effort normal and breath sounds normal. No nasal flaring or stridor. No " - slow transit, partial obstruction, may be related to fluid overload as seen on CT  - large volume ascites, mesenteric edema, anasarca, and dilation of the IVC  - paracentesis ordered   respiratory distress. She has no wheezes. She has no rhonchi. She has no rales. She exhibits no retraction.   Abdominal: She exhibits no distension. Soft. There is no abdominal tenderness.   Musculoskeletal: Normal range of motion.         General: No tenderness or deformity. Normal range of motion.   Lymphadenopathy:     She has no cervical adenopathy.   Neurological: no focal deficit. She is alert. She has normal reflexes. Suck normal.   Skin: Skin is warm, dry, not diaphoretic, not pale, no rash and not purpuric. Capillary refill takes less than 2 seconds. Turgor is normal. No petechiae jaundice  Nursing note and vitals reviewed.  COVID test is positive.  Results for orders placed or performed in visit on 04/15/23   SARS Coronavirus 2 Antigen, POCT Manual Read   Result Value Ref Range    SARS Coronavirus 2 Antigen Positive (A) Negative     Acceptable Yes      Will treat symptomatically but would also give short course of antibiotic for left otitis media.  Assessment:     1. Fever, unspecified fever cause    2. Non-recurrent acute suppurative otitis media of left ear without spontaneous rupture of tympanic membrane    3. Acute rhinitis    4. Acute conjunctivitis of both eyes, unspecified acute conjunctivitis type    5. COVID-19        Plan:       Fever, unspecified fever cause  -     SARS Coronavirus 2 Antigen, POCT Manual Read  -     Cancel: POCT respiratory syncytial virus    Non-recurrent acute suppurative otitis media of left ear without spontaneous rupture of tympanic membrane  -     amoxicillin (AMOXIL) 400 mg/5 mL suspension; Take 4.4 mLs (352 mg total) by mouth 2 (two) times daily. for 5 days  Dispense: 44 mL; Refill: 0    Acute rhinitis    Acute conjunctivitis of both eyes, unspecified acute conjunctivitis type  -     erythromycin (ROMYCIN) ophthalmic ointment; Place into both eyes every 8 (eight) hours.  Dispense: 1 g; Refill: 0    COVID-19

## 2023-08-27 NOTE — SUBJECTIVE & OBJECTIVE
Interval History: Continues to have intermittent bleeding from AVF site. Paracentesis tomorrow.    Review of Systems   Constitutional:  Positive for fatigue. Negative for fever.   Respiratory:  Negative for shortness of breath.    Cardiovascular:  Negative for leg swelling.   Gastrointestinal:  Positive for abdominal distention and abdominal pain. Negative for blood in stool, diarrhea, nausea and vomiting.   Musculoskeletal:  Negative for back pain.   Skin:  Negative for rash.     Objective:     Vital Signs (Most Recent):  Temp: 97.5 °F (36.4 °C) (08/27/23 1013)  Pulse: 60 (08/27/23 1044)  Resp: 16 (08/27/23 1013)  BP: 133/72 (08/27/23 1013)  SpO2: 95 % (08/27/23 1013) Vital Signs (24h Range):  Temp:  [97.5 °F (36.4 °C)-98 °F (36.7 °C)] 97.5 °F (36.4 °C)  Pulse:  [] 60  Resp:  [16-17] 16  SpO2:  [91 %-99 %] 95 %  BP: (111-133)/(58-72) 133/72     Weight: 68.9 kg (151 lb 14.4 oz)  Body mass index is 22.43 kg/m².    Intake/Output Summary (Last 24 hours) at 8/27/2023 1428  Last data filed at 8/27/2023 1256  Gross per 24 hour   Intake 872 ml   Output 0 ml   Net 872 ml         Physical Exam  Constitutional:       General: He is not in acute distress.  HENT:      Head: Normocephalic and atraumatic.      Nose: No congestion.   Eyes:      Extraocular Movements: Extraocular movements intact.   Cardiovascular:      Rate and Rhythm: Regular rhythm.   Pulmonary:      Breath sounds: Normal breath sounds.   Abdominal:      General: There is distension.      Tenderness: There is abdominal tenderness.   Musculoskeletal:         General: Normal range of motion.   Skin:     General: Skin is warm.   Neurological:      General: No focal deficit present.             Significant Labs: All pertinent labs within the past 24 hours have been reviewed.  BMP:   Recent Labs   Lab 08/27/23  0606   *   *   K 5.1      CO2 21*   BUN 34*   CREATININE 4.2*   CALCIUM 7.7*   MG 2.1     CBC:   Recent Labs   Lab 08/26/23  023  The infant has lost 11.3%, I asked mom when she pumped if she was getting anything, she stated that maybe a few drops but would keep trying. I talked with her about the infant needing volume and that after 10% the neonatologist recommend to supplement with formula. She verbalized understanding. I gave her the enfamil and encouraged her to burp frequently and to offer no more than 20 mls the first bottle. The mother verbalized understanding. 08/27/23  0606   WBC 3.09* 3.25*   HGB 8.7* 7.9*   HCT 28.0* 24.9*    174       Significant Imaging: I have reviewed all pertinent imaging results/findings within the past 24 hours.

## 2023-08-27 NOTE — PROGRESS NOTES
[facility] Medicine  Progress Note    Patient Name: Cosme Lewis  MRN: [id number]  Patient Class: IP- Inpatient   Admission Date: 8/19/2023  Length of Stay: 7 days  Attending Physician: Selina Gallagher MD  Primary Care Provider: Eliecer Ohara III, MD        Subjective:     Principal Problem:Bleeding from dialysis shunt        HPI:  No notes on file    Overview/Hospital Course:  Patient was evaluated for respiratory failure and volume overload in setting of concerns for AVF malfunction. Vascular performed fistulogram 08/20 for bleeding AVF, result showed no flow limiting stenosis from large pseudoaneurysms to mid AVF, no intervention was done as patient had strong palpable thrill. Reported with recurrent epistaxis. ENT consulted and packed with left nare absorbable packing and aquaphor to bilateral nares bid. He has intermittently required 5L oxygen despite HD. Patient then started to complain of nausea, abdominal distention and inability to eat. KUB showed obstruction and CT revealed multiple signs suggestive of significant fluid overload bilateral pleural effusions, large volume ascites, mesenteric edema, anasarca, and dilation of the IVC findings may represent slow transit, partial obstruction, diffuse enteritis, or may be related to fluid overload. Bowel function has improved however due to his large volume ascites, paracentesis was ordered.         Interval History: Continues to have intermittent bleeding from AVF site. Paracentesis tomorrow.    Review of Systems   Constitutional:  Positive for fatigue. Negative for fever.   Respiratory:  Negative for shortness of breath.    Cardiovascular:  Negative for leg swelling.   Gastrointestinal:  Positive for abdominal distention and abdominal pain. Negative for blood in stool, diarrhea, nausea and vomiting.   Musculoskeletal:  Negative for back pain.   Skin:  Negative for rash.     Objective:     Vital Signs (Most Recent):  Temp: 97.5 °F (36.4 °C)  (08/27/23 1013)  Pulse: 60 (08/27/23 1044)  Resp: 16 (08/27/23 1013)  BP: 133/72 (08/27/23 1013)  SpO2: 95 % (08/27/23 1013) Vital Signs (24h Range):  Temp:  [97.5 °F (36.4 °C)-98 °F (36.7 °C)] 97.5 °F (36.4 °C)  Pulse:  [] 60  Resp:  [16-17] 16  SpO2:  [91 %-99 %] 95 %  BP: (111-133)/(58-72) 133/72     Weight: 68.9 kg (151 lb 14.4 oz)  Body mass index is 22.43 kg/m².    Intake/Output Summary (Last 24 hours) at 8/27/2023 1428  Last data filed at 8/27/2023 1256  Gross per 24 hour   Intake 872 ml   Output 0 ml   Net 872 ml         Physical Exam  Constitutional:       General: He is not in acute distress.  HENT:      Head: Normocephalic and atraumatic.      Nose: No congestion.   Eyes:      Extraocular Movements: Extraocular movements intact.   Cardiovascular:      Rate and Rhythm: Regular rhythm.   Pulmonary:      Breath sounds: Normal breath sounds.   Abdominal:      General: There is distension.      Tenderness: There is abdominal tenderness.   Musculoskeletal:         General: Normal range of motion.   Skin:     General: Skin is warm.   Neurological:      General: No focal deficit present.             Significant Labs: All pertinent labs within the past 24 hours have been reviewed.  BMP:   Recent Labs   Lab 08/27/23  0606   *   *   K 5.1      CO2 21*   BUN 34*   CREATININE 4.2*   CALCIUM 7.7*   MG 2.1     CBC:   Recent Labs   Lab 08/26/23  0239 08/27/23  0606   WBC 3.09* 3.25*   HGB 8.7* 7.9*   HCT 28.0* 24.9*    174       Significant Imaging: I have reviewed all pertinent imaging results/findings within the past 24 hours.      Assessment/Plan:      * Bleeding from dialysis shunt  -S/p fistulogram, no flow stenosis due to large PSA. No intervention by vascular surgery, large palpable thrill present  -Bleeding resolved with applying pressure       Small bowel obstruction  - slow transit, partial obstruction, may be related to fluid overload as seen on CT  - large volume ascites,  mesenteric edema, anasarca, and dilation of the IVC  - paracentesis ordered    Hyperglycemia due to type 2 diabetes mellitus  Patient's FSGs are controlled on current medication regimen.  Last A1c reviewed-   Lab Results   Component Value Date    HGBA1C 10.7 (H) 07/18/2023     Most recent fingerstick glucose reviewed-   Recent Labs   Lab 08/23/23  1638 08/24/23  0720 08/24/23  1130   POCTGLUCOSE 77 162* 165*     Current correctional scale  Low  Maintain anti-hyperglycemic dose as follows-   Antihyperglycemics (From admission, onward)    Start     Stop Route Frequency Ordered    08/22/23 2100  insulin detemir U-100 (Levemir) pen 5 Units         -- SubQ 2 times daily 08/22/23 0953    08/22/23 1051  insulin aspart U-100 pen 1-10 Units         -- SubQ Before meals & nightly PRN 08/22/23 0953        Hold Oral hypoglycemics while patient is in the hospital.    Acute on chronic respiratory failure with hypoxia and hypercapnia  Patient with Hypoxic Respiratory failure which is Acute.  he is not on home oxygen. Supplemental oxygen was provided and noted- Oxygen Concentration (%):  [32] 32    .   Signs/symptoms of respiratory failure include- use of accessory muscles. Contributing diagnoses includes - CHF Labs and images were reviewed. Patient Has not had a recent ABG. Will treat underlying causes and adjust management of respiratory failure as follows- volume management by dialysis    Epistaxis  - ENT eval: s/p left nare packing and Afrin q8h x 48h  - humidified Oxygen if necessary  - aquaphor to bilateral nares bid  - may place mustache dressing under nose for trickling  - apply Afrin to affected nasal cavity if epistaxis recurs      ESRD (end stage renal disease)  - Nephrology consulting         VTE Risk Mitigation (From admission, onward)         Ordered     apixaban tablet 5 mg  2 times daily         08/22/23 0731     IP VTE HIGH RISK PATIENT  Once         08/20/23 0036     Place sequential compression device  Until  discontinued         08/20/23 0030                Discharge Planning   GERA: 8/28/2023     Code Status: Full Code   Is the patient medically ready for discharge?: Yes    Reason for patient still in hospital (select all that apply): Treatment  Discharge Plan A: Return to nursing home   Discharge Delays: None known at this time              Selina Gallagher MD  Department of Hospital Medicine   [facility]

## 2023-08-28 NOTE — SUBJECTIVE & OBJECTIVE
Interval History: Patient seen and examined while undergoing iHD this morning for which he is currently tolerating well. No acute events overnight. Afebrile with pulse ranging from 60-50s bpm. Systolic blood pressures ranging from 150-120s mmHg. He is saturating +92% on room air. He is positive ~525 mL in the last 24 hours.     Review of patient's allergies indicates:  No Known Allergies  Current Facility-Administered Medications   Medication Frequency    0.9%  NaCl infusion PRN    0.9%  NaCl infusion Once    acetaminophen tablet 650 mg Q4H PRN    apixaban tablet 5 mg BID    aspirin EC tablet 81 mg Daily    atorvastatin tablet 40 mg Daily    carvediloL tablet 12.5 mg BID    cloNIDine 0.3 mg/24 hr td ptwk 1 patch Q7 Days    dextrose 10% bolus 125 mL 125 mL PRN    dextrose 10% bolus 250 mL 250 mL PRN    FLUoxetine capsule 40 mg Daily    fluticasone furoate-vilanteroL 100-25 mcg/dose diskus inhaler 1 puff Daily    glucagon (human recombinant) injection 1 mg PRN    glucose chewable tablet 16 g PRN    glucose chewable tablet 24 g PRN    hydrALAZINE tablet 25 mg Q8H PRN    insulin aspart U-100 pen 1-10 Units QID (AC + HS) PRN    insulin detemir U-100 (Levemir) pen 5 Units BID    isosorbide mononitrate 24 hr tablet 30 mg Daily    labetalol 20 mg/4 mL (5 mg/mL) IV syring Q6H PRN    lisinopriL tablet 40 mg QHS    melatonin tablet 6 mg Nightly PRN    NIFEdipine 24 hr tablet 60 mg BID    ondansetron injection 4 mg Q6H PRN    prochlorperazine injection Soln 5 mg Q6H PRN    QUEtiapine tablet 25 mg Nightly PRN    sevelamer carbonate tablet 800 mg TID    sodium chloride 0.65 % nasal spray 1 spray Q4H    sodium chloride 0.9% flush 10 mL PRN    tiotropium bromide 2.5 mcg/actuation inhaler 2 puff Daily    white petrolatum 41 % ointment BID       Objective:     Vital Signs (Most Recent):  Temp: 97.2 °F (36.2 °C) (08/28/23 0743)  Pulse: (!) 57 (08/28/23 0800)  Resp: 16 (08/28/23 0800)  BP: 129/75 (08/28/23 0837)  SpO2: (!) 94 %  (08/28/23 0800) Vital Signs (24h Range):  Temp:  [97.2 °F (36.2 °C)-97.6 °F (36.4 °C)] 97.2 °F (36.2 °C)  Pulse:  [57-64] 57  Resp:  [16-18] 16  SpO2:  [92 %-97 %] 94 %  BP: (124-155)/(69-84) 129/75     Weight: 68.9 kg (151 lb 14.4 oz) (08/20/23 0525)  Body mass index is 22.43 kg/m².  Body surface area is 1.83 meters squared.    I/O last 3 completed shifts:  In: 872 [P.O.:872]  Out: 0      Physical Exam  Vitals and nursing note reviewed.   Constitutional:       General: He is awake. He is not in acute distress.     Appearance: He is ill-appearing. He is not diaphoretic.      Interventions: Nasal cannula in place.   HENT:      Head: Normocephalic and atraumatic.      Right Ear: External ear normal.      Left Ear: External ear normal.      Nose: Nose normal.      Mouth/Throat:      Mouth: Mucous membranes are dry.      Pharynx: Oropharynx is clear. No oropharyngeal exudate or posterior oropharyngeal erythema.      Comments: Poor dentition.  Eyes:      General: No scleral icterus.        Right eye: No discharge.         Left eye: No discharge.      Extraocular Movements: Extraocular movements intact.      Conjunctiva/sclera: Conjunctivae normal.   Cardiovascular:      Rate and Rhythm: Normal rate.      Heart sounds: Murmur heard.      Systolic murmur is present with a grade of 1/6.      No friction rub. No gallop.      Arteriovenous access: Left arteriovenous access is present.     Comments: Left upper extremity AVF with palpable thrill and audible bruit.  Pulmonary:      Effort: Pulmonary effort is normal. No respiratory distress.      Breath sounds: Rales present. No wheezing or rhonchi.      Comments: Faint bibasilar crackles appreciated.  Abdominal:      General: Abdomen is protuberant. Bowel sounds are normal. There is distension.      Palpations: Abdomen is soft.      Tenderness: There is no abdominal tenderness.   Musculoskeletal:      Cervical back: Neck supple.      Right lower leg: No edema.      Left lower  leg: No edema.   Skin:     General: Skin is warm and dry.      Coloration: Skin is not jaundiced.      Findings: Bruising present.   Neurological:      General: No focal deficit present.      Mental Status: He is alert. Mental status is at baseline.      Cranial Nerves: No cranial nerve deficit.      Comments: Grossly hard of hearing.   Psychiatric:         Mood and Affect: Mood normal.         Behavior: Behavior normal. Behavior is cooperative.          Significant Labs:  BMP:   Recent Labs   Lab 08/28/23 0449   *   *   K 5.4*      CO2 19*   BUN 43*   CREATININE 5.2*   CALCIUM 7.7*   MG 2.0     CBC:   Recent Labs   Lab 08/28/23 0449   WBC 3.15*   RBC 2.74*   HGB 8.0*   HCT 25.9*      MCV 95   MCH 29.2   MCHC 30.9*     CMP:   Recent Labs   Lab 08/28/23 0449   *   CALCIUM 7.7*   ALBUMIN 2.7*   PROT 6.1   *   K 5.4*   CO2 19*      BUN 43*   CREATININE 5.2*   ALKPHOS 169*   ALT <5*   AST 12   BILITOT 0.5     Coagulation:   Recent Labs   Lab 08/21/23  1416 08/22/23  0430 08/22/23  1128   INR 1.3*  --   --    APTT 41.0*   < > >150.0*    < > = values in this interval not displayed.     LFTs:   Recent Labs   Lab 08/28/23 0449   ALT <5*   AST 12   ALKPHOS 169*   BILITOT 0.5   PROT 6.1   ALBUMIN 2.7*     Microbiology Results (last 7 days)       ** No results found for the last 168 hours. **          Specimen (24h ago, onward)      None          Significant Imaging:  I have reviewed all imagining in the last 24 hours.

## 2023-08-28 NOTE — PLAN OF CARE
NURSING HOME ORDERS    [facility]29/2023  [facility]  [address] [facility]  [facility] [address]  Dept: [phone]  Loc: [phone]     Admit to Nursing Home:  Skilled Nursing Facility    Diagnoses:  Active Hospital Problems    Diagnosis  POA    *Bleeding from dialysis shunt [T82.838A]  Yes    Abdominal distension [R14.0]  Yes    Small bowel obstruction [K56.609]  No    Hyperglycemia due to type 2 diabetes mellitus [E11.65]  Unknown    Acute on chronic respiratory failure with hypoxia and hypercapnia [J96.21, J96.22]  Yes    ESRD on hemodialysis [N18.6, Z99.2]  Not Applicable    Hypertension [I10]  Yes    Epistaxis [R04.0]  Yes    COPD (chronic obstructive pulmonary disease) [J44.9]  Yes    AVF (arteriovenous fistula) [I77.0]  Yes    Anemia in ESRD (end-stage renal disease) [N18.6, D63.1]  Yes     Chronic    HLD (hyperlipidemia) [E78.5]  Yes     Chronic    Chronic diastolic heart failure [I50.32]  Yes     Chronic    ESRD (end stage renal disease) [N18.6]  Yes      Resolved Hospital Problems   No resolved problems to display.       Patient is homebound due to:  Bleeding from dialysis shunt    Allergies:Review of patient's allergies indicates:  No Known Allergies    Vitals:  Routine    Diet: renal diet    Activities:   Activity as tolerated    Goals of Care Treatment Preferences:  Code Status: Full Code      Labs:  None    Nursing Precautions:  Fall    Consults:   PT to evaluate and treat- 3 times a week and OT to evaluate and treat- 3 times a week     Miscellaneous Care: CHF Care: Daily Weight with notification of MD/NP of 2lb or > increase in 24 hours    v/s and O2 sat every shift    Oxygen as needed for sats <90%    Report abnormal breath sounds to MD/NP    Edema checks q shift- notify MD/NP of increased edema    Task segmentation by nursing for daily care to decrease exertion    Schedule appointment with GI at [facility] in 2 Weeks    CHF education to include diet ,medication,  and CHF flags for MD notification                   Diabetes Care:  Fingerstick blood sugar AC and HS and Report CBG < 60 or > 350 to physician.      Medications: Discontinue all previous medication orders, if any. See new list below.     Medication List        CHANGE how you take these medications      atorvastatin 40 MG tablet  Commonly known as: LIPITOR  Take 1 tablet (40 mg total) by mouth once daily.  What changed: when to take this     carvediloL 12.5 MG tablet  Commonly known as: COREG  Take 1 tablet (12.5 mg total) by mouth 2 (two) times daily.  What changed:   medication strength  how much to take            CONTINUE taking these medications      acetaminophen 650 MG Tbsr  Commonly known as: TYLENOL  Take 650 mg by mouth every 8 (eight) hours as needed (pain or fever over 100.4).     albuterol 90 mcg/actuation inhaler  Commonly known as: PROVENTIL/VENTOLIN HFA  Inhale 2 puffs into the lungs 4 (four) times daily as needed (breathing).     albuterol-ipratropium 2.5 mg-0.5 mg/3 mL nebulizer solution  Commonly known as: DUO-NEB  Take 3 mLs by nebulization every 4 (four) hours while awake. Rescue     apixaban 5 mg Tab  Commonly known as: ELIQUIS  Take 5 mg by mouth 2 (two) times daily.     artificial tears 0.5 % ophthalmic solution  Commonly known as: ISOPTO TEARS  Place 1 drop into both eyes 6 (six) times daily.     aspirin 81 MG EC tablet  Commonly known as: ECOTRIN  Take 81 mg by mouth once daily.     cetirizine 10 MG tablet  Commonly known as: ZYRTEC  Take 10 mg by mouth daily as needed (itching).     cloNIDine 0.3 mg/24 hr td ptwk 0.3 mg/24 hr  Commonly known as: CATAPRES  APPLY ONE PATCH TOPICALLY EVERY WEEK FOR HIGH BLOOD PRESSURE     epoetin xavier 10,000 unit/mL injection  Commonly known as: PROCRIT  Inject 0.7 mLs (7,000 Units total) into the skin every Tues, Thurs, Sat.     FLUoxetine 40 MG capsule  Take 40 mg by mouth once daily.     fluticasone-salmeterol 250-50 mcg/dose 250-50 mcg/dose diskus  inhaler  Commonly known as: ADVAIR  Inhale 1 puff into the lungs 2 (two) times daily.     GLUCAGON EMERGENCY KIT (HUMAN) 1 mg Solr  Generic drug: glucagon  1 mg into the muscle as needed if CBG < 70     HYDROPHILIC CREAM TOP  Apply liberal amount to affected area twice daily for dry skin     INCRUSE ELLIPTA 62.5 mcg/actuation inhalation capsule  Generic drug: umeclidinium  Inhale 62.5 mcg into the lungs once daily. Controller     isosorbide mononitrate 30 MG 24 hr tablet  Commonly known as: IMDUR  Take 1 tablet (30 mg total) by mouth once daily.     lisinopriL 40 MG tablet  Commonly known as: PRINIVIL,ZESTRIL  Take 1 tablet (40 mg total) by mouth every evening.     melatonin 3 mg Tbdl  Take 9 mg by mouth nightly as needed (sleep).     NEPRO CARB STEADY ORAL  Give 1 carton by mouth with each meal.     NIFEdipine 60 MG (OSM) 24 hr tablet  Commonly known as: PROCARDIA-XL  Take 1 tablet (60 mg total) by mouth 2 (two) times a day.     ondansetron 8 MG tablet  Commonly known as: ZOFRAN  Take 1 tablet by mouth 3 (three) times daily as needed for Nausea.     sevelamer carbonate 800 mg Tab  Commonly known as: RENVELA  Take 800 mg by mouth 3 (three) times daily.     sodium chloride 0.65 % nasal spray  Commonly known as: Saline NasaL  1 spray by Nasal route as needed (dry nostril).     tiotropium bromide 2.5 mcg/actuation inhaler  Commonly known as: SPIRIVA RESPIMAT  Inhale 2 puffs into the lungs Daily.     vitamin renal formula (B-complex-vitamin c-folic acid) 1 mg Cap  Commonly known as: NEPHROCAP  Take 1 capsule by mouth once daily.     white petrolatum ointment  Commonly known as: VASELINE  Apply topically once daily. Apply small amount to both nostrils daily                Immunizations Administered as of 8/29/2023       Name Date Dose VIS Date Route Exp Date    COVID-19, MRNA, LN-S, PF (Moderna) 1/27/2022 -- -- -- --    COVID-19, MRNA, LN-S, PF (Pfizer) (Purple Cap) 3/2/2021 -- -- -- --    : Pf[employer] Inc      Lot: [id number]     COVID-19, MRNA, LN-S, PF (Pfizer) (Purple Cap) 2/9/2021 -- -- -- --    : Pf[employer] Inc     Lot: [id number]             This patient has had both covid vaccinations    Some patients may experience side effects after vaccination.  These may include fever, headache, muscle or joint aches.  Most symptoms resolve with 24-48 hours and do not require urgent medical evaluation unless they persist for more than 72 hours or symptoms are concerning for an unrelated medical condition.          _________________________________  Selina Gallagher MD  08/29/2023

## 2023-08-28 NOTE — ASSESSMENT & PLAN NOTE
- slow transit, partial obstruction, may be related to fluid overload as seen on CT  - large volume ascites, mesenteric edema, anasarca, and dilation of the IVC  - paracentesis for tomorrow

## 2023-08-28 NOTE — ASSESSMENT & PLAN NOTE
-S/p fistulogram, no flow stenosis due to large PSA. No intervention by vascular surgery, large palpable thrill present  -Bleeding resolved with applying pressure PRN

## 2023-08-28 NOTE — PLAN OF CARE
Sw sent referral to [facility] as Pt is stable for dc after HD, NH orders needed, will follow.

## 2023-08-28 NOTE — PROGRESS NOTES
[facility]  Nephrology  Progress Note    Patient Name: Cosme Lewis  MRN: [id number]  Admission Date: 8/19/2023  Hospital Length of Stay: 8 days  Attending Provider: Selina Gallagher MD   Primary Care Physician: Eliecer Ohara III, MD  Principal Problem:Bleeding from dialysis shunt    Subjective:     HPI: 57 y.o. male with CHF, ESRD on HD MWF, history of PE on eliquis, HTN, DM who presented to the hospital with a chief complaint of inability to dialyze. The patient has a LUE fistula that was noted to have some bleeding after dialysis on 8/16/23. He was unable to dialyze 8/18/23 secondary to bleeding/oozing with no intervention. He was sent to the ER for worsening shortness of breath and persistent bleeding at the AV fistula site.     In the ER he was noted to be hypoxemic, mildly hypercapnic with a slow bleed form the AV fistula at the previous areas of access. ER placed quick clot and pressure with eventual stopping of the bleed. Vascular surgery was consulted for fistula revision as he has developed significant pseudoaneurysm. Patient s/p fistulogram with vascular surgery on 8/20/23.      Nephrology was consulted for management of ESRD/HD.       Interval History: Patient seen and examined while undergoing iHD this morning for which he is currently tolerating well. No acute events overnight. Afebrile with pulse ranging from 60-50s bpm. Systolic blood pressures ranging from 150-120s mmHg. He is saturating +92% on room air. He is positive ~525 mL in the last 24 hours.     Review of patient's allergies indicates:  No Known Allergies  Current Facility-Administered Medications   Medication Frequency    0.9%  NaCl infusion PRN    0.9%  NaCl infusion Once    acetaminophen tablet 650 mg Q4H PRN    apixaban tablet 5 mg BID    aspirin EC tablet 81 mg Daily    atorvastatin tablet 40 mg Daily    carvediloL tablet 12.5 mg BID    cloNIDine 0.3 mg/24 hr td ptwk 1 patch Q7 Days    dextrose 10% bolus 125 mL 125 mL PRN     dextrose 10% bolus 250 mL 250 mL PRN    FLUoxetine capsule 40 mg Daily    fluticasone furoate-vilanteroL 100-25 mcg/dose diskus inhaler 1 puff Daily    glucagon (human recombinant) injection 1 mg PRN    glucose chewable tablet 16 g PRN    glucose chewable tablet 24 g PRN    hydrALAZINE tablet 25 mg Q8H PRN    insulin aspart U-100 pen 1-10 Units QID (AC + HS) PRN    insulin detemir U-100 (Levemir) pen 5 Units BID    isosorbide mononitrate 24 hr tablet 30 mg Daily    labetalol 20 mg/4 mL (5 mg/mL) IV syring Q6H PRN    lisinopriL tablet 40 mg QHS    melatonin tablet 6 mg Nightly PRN    NIFEdipine 24 hr tablet 60 mg BID    ondansetron injection 4 mg Q6H PRN    prochlorperazine injection Soln 5 mg Q6H PRN    QUEtiapine tablet 25 mg Nightly PRN    sevelamer carbonate tablet 800 mg TID    sodium chloride 0.65 % nasal spray 1 spray Q4H    sodium chloride 0.9% flush 10 mL PRN    tiotropium bromide 2.5 mcg/actuation inhaler 2 puff Daily    white petrolatum 41 % ointment BID       Objective:     Vital Signs (Most Recent):  Temp: 97.2 °F (36.2 °C) (08/28/23 0743)  Pulse: (!) 57 (08/28/23 0800)  Resp: 16 (08/28/23 0800)  BP: 129/75 (08/28/23 0837)  SpO2: (!) 94 % (08/28/23 0800) Vital Signs (24h Range):  Temp:  [97.2 °F (36.2 °C)-97.6 °F (36.4 °C)] 97.2 °F (36.2 °C)  Pulse:  [57-64] 57  Resp:  [16-18] 16  SpO2:  [92 %-97 %] 94 %  BP: (124-155)/(69-84) 129/75     Weight: 68.9 kg (151 lb 14.4 oz) (08/20/23 0525)  Body mass index is 22.43 kg/m².  Body surface area is 1.83 meters squared.    I/O last 3 completed shifts:  In: 872 [P.O.:872]  Out: 0      Physical Exam  Vitals and nursing note reviewed.   Constitutional:       General: He is awake. He is not in acute distress.     Appearance: He is ill-appearing. He is not diaphoretic.      Interventions: Nasal cannula in place.   HENT:      Head: Normocephalic and atraumatic.      Right Ear: External ear normal.      Left Ear: External ear normal.      Nose: Nose normal.       Mouth/Throat:      Mouth: Mucous membranes are dry.      Pharynx: Oropharynx is clear. No oropharyngeal exudate or posterior oropharyngeal erythema.      Comments: Poor dentition.  Eyes:      General: No scleral icterus.        Right eye: No discharge.         Left eye: No discharge.      Extraocular Movements: Extraocular movements intact.      Conjunctiva/sclera: Conjunctivae normal.   Cardiovascular:      Rate and Rhythm: Normal rate.      Heart sounds: Murmur heard.      Systolic murmur is present with a grade of 1/6.      No friction rub. No gallop.      Arteriovenous access: Left arteriovenous access is present.     Comments: Left upper extremity AVF with palpable thrill and audible bruit.  Pulmonary:      Effort: Pulmonary effort is normal. No respiratory distress.      Breath sounds: Rales present. No wheezing or rhonchi.      Comments: Faint bibasilar crackles appreciated.  Abdominal:      General: Abdomen is protuberant. Bowel sounds are normal. There is distension.      Palpations: Abdomen is soft.      Tenderness: There is no abdominal tenderness.   Musculoskeletal:      Cervical back: Neck supple.      Right lower leg: No edema.      Left lower leg: No edema.   Skin:     General: Skin is warm and dry.      Coloration: Skin is not jaundiced.      Findings: Bruising present.   Neurological:      General: No focal deficit present.      Mental Status: He is alert. Mental status is at baseline.      Cranial Nerves: No cranial nerve deficit.      Comments: Grossly hard of hearing.   Psychiatric:         Mood and Affect: Mood normal.         Behavior: Behavior normal. Behavior is cooperative.          Significant Labs:  BMP:   Recent Labs   Lab 08/28/23 0449   *   *   K 5.4*      CO2 19*   BUN 43*   CREATININE 5.2*   CALCIUM 7.7*   MG 2.0     CBC:   Recent Labs   Lab 08/28/23 0449   WBC 3.15*   RBC 2.74*   HGB 8.0*   HCT 25.9*      MCV 95   MCH 29.2   MCHC 30.9*     CMP:   Recent  Labs   Lab 08/28/23  0449   *   CALCIUM 7.7*   ALBUMIN 2.7*   PROT 6.1   *   K 5.4*   CO2 19*      BUN 43*   CREATININE 5.2*   ALKPHOS 169*   ALT <5*   AST 12   BILITOT 0.5     Coagulation:   Recent Labs   Lab 08/21/23  1416 08/22/23  0430 08/22/23  1128   INR 1.3*  --   --    APTT 41.0*   < > >150.0*    < > = values in this interval not displayed.     LFTs:   Recent Labs   Lab 08/28/23  0449   ALT <5*   AST 12   ALKPHOS 169*   BILITOT 0.5   PROT 6.1   ALBUMIN 2.7*     Microbiology Results (last 7 days)       ** No results found for the last 168 hours. **          Specimen (24h ago, onward)      None          Significant Imaging:  I have reviewed all imagining in the last 24 hours.    Assessment/Plan:     Cardiac/Vascular  * Bleeding from dialysis shunt  - Vascular Surgery consulted and following    Hypertension  - management per primary team    Renal/  ESRD on hemodialysis  Outpatient HD Information:  -Outpatient HD unit: [facility]   -HD tx days: MWF   -HD tx time: 4hrs   -Last HD tx prior to presentation: 8/16/23  -HD access: LUE AVF   -HD modality: iHD   -Residual urine: Minimal to anuric       Plan/Recommendations:  - iHD today for metabolic clearance and volume management   - renal diet when not NPO  - strict I/O's and daily weights  - daily renal function panels and magnesium levels  - continue Renvela 800 mg TIDWM  - renally all dose medications to eGFR  - avoid gadolinium, fleets, phos-based laxatives, NSAIDs, etc.    Oncology  Anemia in ESRD (end-stage renal disease)  - target Hg of 10-12  - hemoglobin 8 this AM  - most recent iron panel with iron 48, transferrin 168, TIBC 249, 19% saturation and ferritin 1,455  - have started epogen 4,000 units with HD      Thank you for your consult. I will follow-up with patient. Please contact us if you have any additional questions.    Cole Rodriguez MD  Nephrology  [facility]

## 2023-08-28 NOTE — PROGRESS NOTES
Dialysis completed. Needles removed from UMA fistula with pressure held to sites for 10 minutes each with hemostasis achieved. Gauze dressing applied. Patient dialyzed for 3.5 hours with fluid removal of 2.5 liters. Tolerated well with stable vital signs. Patient returned to his room via stretcher.

## 2023-08-28 NOTE — PROGRESS NOTES
Patient received in the [facility] via stretcher. Maintenance dialysis started via 15 gauge fistula needles x 2 to UMA fistula.   2.26.20 BY KIM GARCIA MLS  RESULTS READ BACK  *    192 202     BMP:    Recent Labs     02/24/20  1303 02/25/20  0603 02/26/20  0311    136 141   K 3.7 4.6 4.0   * 109 110   CO2 15* 8* 18*   BUN 19 18 36*   CREATININE 0.8 0.8 0.8   GLUCOSE 107* 135* 104*     Magnesium:   Lab Results   Component Value Date    MG 2.0 02/25/2020     Hepatic: No results for input(s): AST, ALT, ALB, BILITOT, ALKPHOS in the last 72 hours. INR: No results for input(s): INR in the last 72 hours.       Intake/Output Summary (Last 24 hours) at 2/26/2020 0848  Last data filed at 2/25/2020 1602  Gross per 24 hour   Intake 915 ml   Output 175 ml   Net 740 ml         Medications    Scheduled Medications:    pantoprazole  40 mg Intravenous BID    metoclopramide  5 mg Intravenous TID AC    metoprolol  5 mg Intravenous Q6H    metroNIDAZOLE  500 mg Intravenous Q8H    ceftolozane-tazobactam (ZERBAXA) IVPB  1.5 g Intravenous Q8H    vancomycin  750 mg Intravenous Q18H    lidocaine PF  5 mL Intradermal Once    sodium chloride flush  10 mL Intravenous 2 times per day    sodium chloride flush  10 mL Intravenous 2 times per day    sodium chloride flush  10 mL Intravenous 2 times per day    allopurinol  100 mg Oral Daily    calcium elemental  500 mg Oral Daily with breakfast    divalproex  500 mg Oral QAM    divalproex  1,000 mg Oral QPM    sodium chloride flush  10 mL Intravenous 2 times per day    sodium chloride  20 mL Intravenous Once     PRN Medications: sodium chloride flush, sodium chloride flush, traMADol, sodium chloride flush, sodium chloride flush, ondansetron, acetaminophen, polyethylene glycol, melatonin  Infusions:    sodium bicarbonate infusion Stopped (02/26/20 0841)         Patient Active Problem List   Diagnosis Code    Family history of malignant neoplasm of gastrointestinal tract Z80.0    B-cell lymphoma ([facility] [address] C85.10    Idiopathic hypotension I95.0    Mitral valve disease I05.9    Pancytopenia ([facility] [address] V88.965    Severe malnutrition ([facility] [address] E43    Acute deep vein thrombosis (DVT) of left upper extremity (HCC) I82.622    Hematoma T14. 8XXA       ASSESSMENT AND RECOMMENDATIONS:    Nausea and vomiting:  Mostly nausea, reports bloody (bright red) emesis yesterday, hgb 6.6. May have Elisa-Watts tear. Will monitor. Recent EGD unremarkable   Recommend PPI BID IV once CL placed   Recommend Reglan 5 mg TID IV  Recommend Zofran 4 mg Q 6 hrs prn  Will treat symptomatically at this time    Discussed plan of care with patient and family     Patient clinical, biochemical, and radiological information discussed with Dr. Jose Mesa. He agrees with the assessment and plan. Anibal Diamond CNP  2/26/2020  8:48 AM     Seen and examined on 02/26  abd soft  Some nausea  Ok for Zofran and Reglan    I have seen and examined this patient personally, and independently of the nurse practitioner. The plan was developed mutually at the time of the visit with the patient. Norberto Shelley and myself have spoken with patient, nursing staff and provided written and verbal instructions .     The above note has been reviewed and I agree with the Assessment,  Diagnosis, and Treatment plan as suggested by Norberto Shelley CNP      [facility] gastroenterology

## 2023-08-28 NOTE — ASSESSMENT & PLAN NOTE
Outpatient HD Information:  -Outpatient HD unit: [facility]   -HD tx days: MWF   -HD tx time: 4hrs   -Last HD tx prior to presentation: 8/16/23  -HD access: LUE AVF   -HD modality: iHD   -Residual urine: Minimal to anuric       Plan/Recommendations:  - iHD today for metabolic clearance and volume management   - renal diet when not NPO  - strict I/O's and daily weights  - daily renal function panels and magnesium levels  - have stopped Renvela   - renally all dose medications to eGFR  - avoid gadolinium, fleets, phos-based laxatives, NSAIDs, etc.

## 2023-08-28 NOTE — SUBJECTIVE & OBJECTIVE
Interval History: Paracentesis scheduled for tomorrow    Review of Systems   Constitutional:  Positive for fatigue. Negative for fever.   Respiratory:  Negative for shortness of breath.    Cardiovascular:  Negative for leg swelling.   Gastrointestinal:  Positive for abdominal distention and abdominal pain. Negative for blood in stool, diarrhea, nausea and vomiting.   Musculoskeletal:  Negative for back pain.   Skin:  Negative for rash.     Objective:     Vital Signs (Most Recent):  Temp: 97.3 °F (36.3 °C) (08/28/23 1530)  Pulse: 66 (08/28/23 1534)  Resp: 16 (08/28/23 1530)  BP: (!) 153/76 (08/28/23 1530)  SpO2: (!) 93 % (08/28/23 1530) Vital Signs (24h Range):  Temp:  [97.2 °F (36.2 °C)-97.9 °F (36.6 °C)] 97.3 °F (36.3 °C)  Pulse:  [57-66] 66  Resp:  [16-18] 16  SpO2:  [92 %-95 %] 93 %  BP: (124-156)/(69-86) 153/76     Weight: 68.9 kg (151 lb 14.4 oz)  Body mass index is 22.43 kg/m².  No intake or output data in the 24 hours ending 08/28/23 1612      Physical Exam  Constitutional:       General: He is not in acute distress.  HENT:      Head: Normocephalic and atraumatic.      Nose: No congestion.   Eyes:      Extraocular Movements: Extraocular movements intact.   Cardiovascular:      Rate and Rhythm: Regular rhythm.   Pulmonary:      Breath sounds: Normal breath sounds.   Abdominal:      General: There is distension.      Tenderness: There is abdominal tenderness.   Musculoskeletal:         General: Normal range of motion.   Skin:     General: Skin is warm.   Neurological:      General: No focal deficit present.             Significant Labs: All pertinent labs within the past 24 hours have been reviewed.  BMP:   Recent Labs   Lab 08/28/23 0449 08/28/23  0958   *  --    *  --    K 5.4*  --      --    CO2 19*  --    BUN 43* 45*   CREATININE 5.2*  --    CALCIUM 7.7*  --    MG 2.0  --      CBC:   Recent Labs   Lab 08/27/23  0606 08/28/23 0449   WBC 3.25* 3.15*   HGB 7.9* 8.0*   HCT 24.9* 25.9*   PLT  174 177       Significant Imaging: I have reviewed all pertinent imaging results/findings within the past 24 hours.

## 2023-08-28 NOTE — PLAN OF CARE
[facility] - Med Surg  Discharge Final Note    Primary Care Provider: Eliecer Ohara III, MD    Expected Discharge Date: 8/28/2023    Final Discharge Note (most recent)       Final Note - 08/28/23 1223          Final Note    Assessment Type Final Discharge Note     Anticipated Discharge Disposition Intermediate Care Facility for jail or Supportive Care     Hospital Resources/Appts/Education Provided Appointments scheduled and added to AVS        Post-Acute Status    Post-Acute Placement Status Set-up Complete/Auth obtained     Discharge Delays None known at this time                     Important Message from Medicare  Important Message from Medicare regarding Discharge Appeal Rights: Given to patient/caregiver, Explained to patient/caregiver, Signed/date by patient/caregiver     Date IMM was signed: 08/24/23  Time IMM was signed: 0948

## 2023-08-28 NOTE — ASSESSMENT & PLAN NOTE
- target Hg of 10-12  - hemoglobin 8 this AM  - most recent iron panel with iron 48, transferrin 168, TIBC 249, 19% saturation and ferritin 1,455

## 2023-08-28 NOTE — ASSESSMENT & PLAN NOTE
Patient's FSGs are controlled on current medication regimen.  Last A1c reviewed-   Lab Results   Component Value Date    HGBA1C 10.7 (H) 07/18/2023     Most recent fingerstick glucose reviewed-   Recent Labs   Lab 08/27/23  2042 08/28/23  0726 08/28/23  1214 08/28/23  1533   POCTGLUCOSE 299* 261* 141* 192*     Current correctional scale  Low  Maintain anti-hyperglycemic dose as follows-   Antihyperglycemics (From admission, onward)    Start     Stop Route Frequency Ordered    08/22/23 2100  insulin detemir U-100 (Levemir) pen 5 Units         -- SubQ 2 times daily 08/22/23 0953    08/22/23 1051  insulin aspart U-100 pen 1-10 Units         -- SubQ Before meals & nightly PRN 08/22/23 0953        Hold Oral hypoglycemics while patient is in the hospital.

## 2023-08-28 NOTE — TELEPHONE ENCOUNTER
----- Message from Jennifer Wilkerson sent at 8/28/2023 11:45 AM CDT -----  Regarding: appt requested  Name of Who is Calling:ARABELLA ALEGRIA [[id number]]          What is the request in detail: goldy mccray same day appt           Can the clinic reply by [facility]: no           [facility] [phone] (home) [phone] (work)

## 2023-08-28 NOTE — PLAN OF CARE
Problem: Adult Inpatient Plan of Care  Goal: Plan of Care Review  Outcome: Ongoing, Progressing  Goal: Patient-Specific Goal (Individualized)  Outcome: Ongoing, Progressing  Goal: Absence of Hospital-Acquired Illness or Injury  Outcome: Ongoing, Progressing  Goal: Optimal Comfort and Wellbeing  Outcome: Ongoing, Progressing  Goal: Readiness for Transition of Care  Outcome: Ongoing, Progressing     Problem: Diabetic Ketoacidosis  Goal: Fluid and Electrolyte Balance with Absence of Ketosis  Outcome: Ongoing, Progressing     Problem: Diabetes Comorbidity  Goal: Blood Glucose Level Within Targeted Range  Outcome: Ongoing, Progressing     Problem: Skin Injury Risk Increased  Goal: Skin Health and Integrity  Outcome: Ongoing, Progressing     Problem: Fall Injury Risk  Goal: Absence of Fall and Fall-Related Injury  Outcome: Ongoing, Progressing     Problem: Device-Related Complication Risk (Hemodialysis)  Goal: Safe, Effective Therapy Delivery  Outcome: Ongoing, Progressing     Problem: Hemodynamic Instability (Hemodialysis)  Goal: Effective Tissue Perfusion  Outcome: Ongoing, Progressing     Problem: Infection (Hemodialysis)  Goal: Absence of Infection Signs and Symptoms  Outcome: Ongoing, Progressing

## 2023-08-28 NOTE — PROGRESS NOTES
[facility] Medicine  Progress Note    Patient Name: Cosme Lewis  MRN: [id number]  Patient Class: IP- Inpatient   Admission Date: 8/19/2023  Length of Stay: 8 days  Attending Physician: Selina Gallagher MD  Primary Care Provider: Eliecer Ohara III, MD        Subjective:     Principal Problem:Bleeding from dialysis shunt        HPI:  No notes on file    Overview/Hospital Course:  Patient was evaluated for respiratory failure and volume overload in setting of concerns for AVF malfunction. Vascular performed fistulogram 08/20 for bleeding AVF, result showed no flow limiting stenosis from large pseudoaneurysms to mid AVF, no intervention was done as patient had strong palpable thrill. Reported with recurrent epistaxis. ENT consulted and packed with left nare absorbable packing and aquaphor to bilateral nares bid. He has intermittently required 5L oxygen despite HD. Patient then started to complain of nausea, abdominal distention and inability to eat. KUB showed obstruction and CT revealed multiple signs suggestive of significant fluid overload bilateral pleural effusions, large volume ascites, mesenteric edema, anasarca, and dilation of the IVC findings may represent slow transit, partial obstruction, diffuse enteritis, or may be related to fluid overload. Bowel function has improved however due to his large volume ascites, paracentesis was ordered.       Interval History: Paracentesis scheduled for tomorrow    Review of Systems   Constitutional:  Positive for fatigue. Negative for fever.   Respiratory:  Negative for shortness of breath.    Cardiovascular:  Negative for leg swelling.   Gastrointestinal:  Positive for abdominal distention and abdominal pain. Negative for blood in stool, diarrhea, nausea and vomiting.   Musculoskeletal:  Negative for back pain.   Skin:  Negative for rash.     Objective:     Vital Signs (Most Recent):  Temp: 97.3 °F (36.3 °C) (08/28/23 1530)  Pulse: 66 (08/28/23  1534)  Resp: 16 (08/28/23 1530)  BP: (!) 153/76 (08/28/23 1530)  SpO2: (!) 93 % (08/28/23 1530) Vital Signs (24h Range):  Temp:  [97.2 °F (36.2 °C)-97.9 °F (36.6 °C)] 97.3 °F (36.3 °C)  Pulse:  [57-66] 66  Resp:  [16-18] 16  SpO2:  [92 %-95 %] 93 %  BP: (124-156)/(69-86) 153/76     Weight: 68.9 kg (151 lb 14.4 oz)  Body mass index is 22.43 kg/m².  No intake or output data in the 24 hours ending 08/28/23 1612      Physical Exam  Constitutional:       General: He is not in acute distress.  HENT:      Head: Normocephalic and atraumatic.      Nose: No congestion.   Eyes:      Extraocular Movements: Extraocular movements intact.   Cardiovascular:      Rate and Rhythm: Regular rhythm.   Pulmonary:      Breath sounds: Normal breath sounds.   Abdominal:      General: There is distension.      Tenderness: There is abdominal tenderness.   Musculoskeletal:         General: Normal range of motion.   Skin:     General: Skin is warm.   Neurological:      General: No focal deficit present.             Significant Labs: All pertinent labs within the past 24 hours have been reviewed.  BMP:   Recent Labs   Lab 08/28/23  0449 08/28/23  0958   *  --    *  --    K 5.4*  --      --    CO2 19*  --    BUN 43* 45*   CREATININE 5.2*  --    CALCIUM 7.7*  --    MG 2.0  --      CBC:   Recent Labs   Lab 08/27/23  0606 08/28/23  0449   WBC 3.25* 3.15*   HGB 7.9* 8.0*   HCT 24.9* 25.9*    177       Significant Imaging: I have reviewed all pertinent imaging results/findings within the past 24 hours.      Assessment/Plan:      * Bleeding from dialysis shunt  -S/p fistulogram, no flow stenosis due to large PSA. No intervention by vascular surgery, large palpable thrill present  -Bleeding resolved with applying pressure PRN      Small bowel obstruction  - slow transit, partial obstruction, may be related to fluid overload as seen on CT  - large volume ascites, mesenteric edema, anasarca, and dilation of the IVC  -  paracentesis for tomorrow    Hyperglycemia due to type 2 diabetes mellitus  Patient's FSGs are controlled on current medication regimen.  Last A1c reviewed-   Lab Results   Component Value Date    HGBA1C 10.7 (H) 07/18/2023     Most recent fingerstick glucose reviewed-   Recent Labs   Lab 08/27/23  2042 08/28/23  0726 08/28/23  1214 08/28/23  1533   POCTGLUCOSE 299* 261* 141* 192*     Current correctional scale  Low  Maintain anti-hyperglycemic dose as follows-   Antihyperglycemics (From admission, onward)    Start     Stop Route Frequency Ordered    08/22/23 2100  insulin detemir U-100 (Levemir) pen 5 Units         -- SubQ 2 times daily 08/22/23 0953    08/22/23 1051  insulin aspart U-100 pen 1-10 Units         -- SubQ Before meals & nightly PRN 08/22/23 0953        Hold Oral hypoglycemics while patient is in the hospital.    Acute on chronic respiratory failure with hypoxia and hypercapnia  Patient with Hypoxic Respiratory failure which is Acute.  he is not on home oxygen. Supplemental oxygen was provided and noted- Oxygen Concentration (%):  [32] 32    .   Signs/symptoms of respiratory failure include- use of accessory muscles. Contributing diagnoses includes - CHF Labs and images were reviewed. Patient Has not had a recent ABG. Will treat underlying causes and adjust management of respiratory failure as follows- volume management by dialysis    Epistaxis  - ENT eval: s/p left nare packing and Afrin q8h x 48h  - humidified Oxygen if necessary  - aquaphor to bilateral nares bid  - may place mustache dressing under nose for trickling  - apply Afrin to affected nasal cavity if epistaxis recurs      ESRD (end stage renal disease)  - Nephrology consulting         VTE Risk Mitigation (From admission, onward)         Ordered     apixaban tablet 5 mg  2 times daily         08/22/23 0731     IP VTE HIGH RISK PATIENT  Once         08/20/23 0036     Place sequential compression device  Until discontinued         08/20/23  0030                Discharge Planning   GERA: 8/28/2023     Code Status: Full Code   Is the patient medically ready for discharge?: Yes    Reason for patient still in hospital (select all that apply): Treatment  Discharge Plan A: Return to nursing home   Discharge Delays: None known at this time              Seilna Gallagher MD  Department of Hospital Medicine   [facility] S[facility]

## 2023-08-29 NOTE — SEDATION DOCUMENTATION
Pt arrived to IR Dept  for Paracentesis. Pt oriented to unit and staff. Plan of care reviewed with patient, patient verbalizes understanding. Comfort measures utilized. Pt safely transferred from stretcher to procedural table. Fall risk reviewed with patient, fall risk interventions maintained. Safety strap applied, positioner pillows utilized to minimize pressure points. Blankets applied. Pt prepped and draped utilizing standard sterile technique. Patient placed on continuous monitoring, as required by sedation policy. Timeouts completed utilizing standard universal time-out, per department and facility policy. RN to remain at bedside, continuous monitoring maintained. Pt resting comfortably. Denies pain/discomfort. Will continue to monitor. See flow sheets for monitoring, medication administration, and updates.

## 2023-08-29 NOTE — PLAN OF CARE
Problem: Adult Inpatient Plan of Care  Goal: Plan of Care Review  Outcome: Met  Goal: Patient-Specific Goal (Individualized)  Outcome: Met  Goal: Absence of Hospital-Acquired Illness or Injury  Outcome: Met  Goal: Optimal Comfort and Wellbeing  Outcome: Met  Goal: Readiness for Transition of Care  Outcome: Met     Problem: Diabetic Ketoacidosis  Goal: Fluid and Electrolyte Balance with Absence of Ketosis  Outcome: Met     Problem: Diabetes Comorbidity  Goal: Blood Glucose Level Within Targeted Range  Outcome: Met     Problem: Skin Injury Risk Increased  Goal: Skin Health and Integrity  Outcome: Met     Problem: Fall Injury Risk  Goal: Absence of Fall and Fall-Related Injury  Outcome: Met     Problem: Device-Related Complication Risk (Hemodialysis)  Goal: Safe, Effective Therapy Delivery  Outcome: Met     Problem: Hemodynamic Instability (Hemodialysis)  Goal: Effective Tissue Perfusion  Outcome: Met     Problem: Infection (Hemodialysis)  Goal: Absence of Infection Signs and Symptoms  Outcome: Met

## 2023-08-29 NOTE — ASSESSMENT & PLAN NOTE
Outpatient HD Information:  -Outpatient HD unit: [facility]   -HD tx days: MWF   -HD tx time: 4hrs   -Last HD tx prior to presentation: 8/16/23  -HD access: LUE AVF   -HD modality: iHD   -Residual urine: Minimal to anuric       Plan/Recommendations:  - iHD tomorrow for metabolic clearance and volume management if remains inpatient  - renal diet when not NPO  - strict I/O's and daily weights  - daily renal function panels and magnesium levels  - have stopped Renvela   - renally all dose medications to eGFR  - avoid gadolinium, fleets, phos-based laxatives, NSAIDs, etc.

## 2023-08-29 NOTE — PROCEDURES
Radiology Post-Procedure Note    Pre Op Diagnosis: Ascites  Post Op Diagnosis: Same    Procedure: Ultrasound Guided Paracentesis    Procedure performed by: Carolin Warren PA-C    Written Informed Consent Obtained: Yes  Specimen Removed: YES (yellow, clear)  Estimated Blood Loss: Minimal    Findings:   Successful paracentesis.  Albumin administered PRN per protocol.    Patient tolerated procedure well.    Carolin Warren PA-C  Interventional Radiology  [phone]

## 2023-08-29 NOTE — SEDATION DOCUMENTATION
Paracentesis complete. 4300 mLs peritoneal fluid drained. Pt tolerated well. Dressing to right abd clean, dry, and intact. Albumin 25% given 100 mLs. Specimens sent per lab order. Report called to floor nurse.

## 2023-08-29 NOTE — PROGRESS NOTES
Nick nigel - [facility]  Nephrology  Progress Note    Patient Name: Cosme Lewis  MRN: [id number]  Admission Date: 8/19/2023  Hospital Length of Stay: 9 days  Attending Provider: Selina Gallagher MD   Primary Care Physician: Eliecer Ohara III, MD  Principal Problem:Bleeding from dialysis shunt    Subjective:     HPI: 57 y.o. male with CHF, ESRD on HD MWF, history of PE on eliquis, HTN, DM who presented to the hospital with a chief complaint of inability to dialyze. The patient has a LUE fistula that was noted to have some bleeding after dialysis on 8/16/23. He was unable to dialyze 8/18/23 secondary to bleeding/oozing with no intervention. He was sent to the ER for worsening shortness of breath and persistent bleeding at the AV fistula site.     In the ER he was noted to be hypoxemic, mildly hypercapnic with a slow bleed form the AV fistula at the previous areas of access. ER placed quick clot and pressure with eventual stopping of the bleed. Vascular surgery was consulted for fistula revision as he has developed significant pseudoaneurysm. Patient s/p fistulogram with vascular surgery on 8/20/23.      Nephrology was consulted for management of ESRD/HD.       Interval History: Patient seen and examined following paracentesis with 4.3 liters removed. No evidence of SBP with segs of only 25. SAAG only 0.7 g/dL. He underwent iHD yesterday with UF 2.5 liters. Otherwise no acute events overnight. Afebrile with pulse ranging from 70-60s bpm. Systolic blood pressures ranging from 150-120s mmHg. He is saturating +92% on 3 liters via nasal cannula.     Review of patient's allergies indicates:  No Known Allergies  Current Facility-Administered Medications   Medication Frequency    0.9%  NaCl infusion PRN    acetaminophen tablet 650 mg Q4H PRN    apixaban tablet 5 mg BID    aspirin EC tablet 81 mg Daily    atorvastatin tablet 40 mg Daily    carvediloL tablet 12.5 mg BID    cloNIDine 0.3 mg/24 hr td ptwk 1 patch Q7 Days     dextrose 10% bolus 125 mL 125 mL PRN    dextrose 10% bolus 250 mL 250 mL PRN    epoetin xavier injection 4,000 Units Every Mon, Wed, Fri    FLUoxetine capsule 40 mg Daily    fluticasone furoate-vilanteroL 100-25 mcg/dose diskus inhaler 1 puff Daily    glucagon (human recombinant) injection 1 mg PRN    glucose chewable tablet 16 g PRN    glucose chewable tablet 24 g PRN    hydrALAZINE tablet 25 mg Q8H PRN    insulin aspart U-100 pen 1-10 Units QID (AC + HS) PRN    insulin detemir U-100 (Levemir) pen 5 Units BID    isosorbide mononitrate 24 hr tablet 30 mg Daily    labetalol 20 mg/4 mL (5 mg/mL) IV syring Q6H PRN    lisinopriL tablet 40 mg QHS    melatonin tablet 6 mg Nightly PRN    NIFEdipine 24 hr tablet 60 mg BID    ondansetron injection 4 mg Q6H PRN    prochlorperazine injection Soln 5 mg Q6H PRN    QUEtiapine tablet 25 mg Nightly PRN    sevelamer carbonate tablet 800 mg TID WM    sodium chloride 0.65 % nasal spray 1 spray Q4H    sodium chloride 0.9% flush 10 mL PRN    tiotropium bromide 2.5 mcg/actuation inhaler 2 puff Daily    white petrolatum 41 % ointment BID       Objective:     Vital Signs (Most Recent):  Temp: 98 °F (36.7 °C) (08/29/23 0711)  Pulse: 73 (08/29/23 0711)  Resp: 20 (08/29/23 0500)  BP: 131/71 (08/29/23 0711)  SpO2: (!) 92 % (08/29/23 0711) Vital Signs (24h Range):  Temp:  [97 °F (36.1 °C)-98.7 °F (37.1 °C)] 98 °F (36.7 °C)  Pulse:  [57-73] 73  Resp:  [16-20] 20  SpO2:  [92 %-96 %] 92 %  BP: (129-168)/(69-88) 131/71     Weight: 68.9 kg (151 lb 14.4 oz) (08/20/23 0525)  Body mass index is 22.43 kg/m².  Body surface area is 1.83 meters squared.    I/O last 3 completed shifts:  In: 1044 [P.O.:744; Other:300]  Out: 3150 [Other:3150]    Physical Exam  Vitals and nursing note reviewed.   Constitutional:       General: He is awake. He is not in acute distress.     Appearance: He is ill-appearing. He is not diaphoretic.      Interventions: Nasal cannula in place.   HENT:      Head:  Normocephalic and atraumatic.      Right Ear: External ear normal.      Left Ear: External ear normal.      Nose: Nose normal.      Mouth/Throat:      Mouth: Mucous membranes are moist.      Pharynx: Oropharynx is clear. No oropharyngeal exudate or posterior oropharyngeal erythema.      Comments: Poor dentition.  Eyes:      General: No scleral icterus.        Right eye: No discharge.         Left eye: No discharge.      Extraocular Movements: Extraocular movements intact.      Conjunctiva/sclera: Conjunctivae normal.   Cardiovascular:      Rate and Rhythm: Normal rate.      Heart sounds: Murmur heard.      Systolic murmur is present with a grade of 1/6.      No friction rub. No gallop.      Arteriovenous access: Left arteriovenous access is present.     Comments: Left upper extremity AVF with palpable thrill and audible bruit.  Pulmonary:      Effort: Pulmonary effort is normal. No respiratory distress.      Breath sounds: Rales present. No wheezing or rhonchi.      Comments: Faint bibasilar crackles appreciated.  Abdominal:      General: Abdomen is protuberant. Bowel sounds are normal. There is distension.      Palpations: Abdomen is soft.      Tenderness: There is no abdominal tenderness.   Musculoskeletal:      Cervical back: Neck supple.      Right lower leg: No edema.      Left lower leg: No edema.   Skin:     General: Skin is warm and dry.      Coloration: Skin is not jaundiced.      Findings: Bruising present.   Neurological:      General: No focal deficit present.      Mental Status: He is alert. Mental status is at baseline.      Cranial Nerves: No cranial nerve deficit.      Comments: Grossly hard of hearing.   Psychiatric:         Mood and Affect: Mood normal.         Behavior: Behavior normal. Behavior is cooperative.          Significant Labs:  BMP:   Recent Labs   Lab 08/28/23  0449 08/28/23  0958 08/28/23  1343   *  --   --    *  --   --    K 5.4*  --   --      --   --    CO2 19*   --   --    BUN 43*   < > 17   CREATININE 5.2*  --   --    CALCIUM 7.7*  --   --    MG 2.0  --   --     < > = values in this interval not displayed.       CBC:   Recent Labs   Lab 08/28/23 0449   WBC 3.15*   RBC 2.74*   HGB 8.0*   HCT 25.9*      MCV 95   MCH 29.2   MCHC 30.9*       CMP:   Recent Labs   Lab 08/28/23  0449 08/28/23  0958 08/28/23  1343   *  --   --    CALCIUM 7.7*  --   --    ALBUMIN 2.7*  --   --    PROT 6.1  --   --    *  --   --    K 5.4*  --   --    CO2 19*  --   --      --   --    BUN 43*   < > 17   CREATININE 5.2*  --   --    ALKPHOS 169*  --   --    ALT <5*  --   --    AST 12  --   --    BILITOT 0.5  --   --     < > = values in this interval not displayed.       Coagulation:   Recent Labs   Lab 08/22/23  1128   APTT >150.0*       LFTs:   Recent Labs   Lab 08/28/23 0449   ALT <5*   AST 12   ALKPHOS 169*   BILITOT 0.5   PROT 6.1   ALBUMIN 2.7*       Microbiology Results (last 7 days)       ** No results found for the last 168 hours. **          Specimen (24h ago, onward)      None          Significant Imaging:  I have reviewed all imagining in the last 24 hours.    Assessment/Plan:     Cardiac/Vascular  * Bleeding from dialysis shunt  - Vascular Surgery consulted and following    Hypertension  - management per primary team    Renal/  ESRD on hemodialysis  Outpatient HD Information:  -Outpatient HD unit: [facility]   -HD tx days: MWF   -HD tx time: 4hrs   -Last HD tx prior to presentation: 8/16/23  -HD access: LUE AVF   -HD modality: iHD   -Residual urine: Minimal to anuric       Plan/Recommendations:  - iHD tomorrow for metabolic clearance and volume management if remains inpatient  - renal diet when not NPO  - strict I/O's and daily weights  - daily renal function panels and magnesium levels  - have stopped Renvela   - renally all dose medications to eGFR  - avoid gadolinium, fleets, phos-based laxatives, NSAIDs, etc.    Oncology  Anemia in ESRD (end-stage  renal disease)  - target Hg of 10-12  - hemoglobin 8 this AM  - most recent iron panel with iron 48, transferrin 168, TIBC 249, 19% saturation and ferritin 1,455      Thank you for your consult. I will follow-up with patient. Please contact us if you have any additional questions.    Cole Rodriguez MD  Nephrology  [facility]

## 2023-08-29 NOTE — PLAN OF CARE
Transport via stretcher for 5:00pm, nurse to call report to  and room 209B, Pt and staff updated as well as nursing.

## 2023-08-29 NOTE — PROGRESS NOTES
Report called to Kirt at [facility].  Waiting for ambulance transport scheduled  for 5pm.

## 2023-08-29 NOTE — PHYSICIAN QUERY
PT Name: Cosme Lewis  MR #: [id number]    DOCUMENTATION CLARIFICATION     CDS/: RASHMI Kebede, RN, CCDS               Contact information: [email or web address]    This form is a permanent document in the medical record.     Query Date: August 29, 2023    By submitting this query, we are merely seeking further clarification of documentation. Please utilize your independent clinical judgment when addressing the question(s) below.    Supporting Clinical Findings Location in Medical Record   Pseudoaneurysm HD fistula with bleeding  H/o PE on eliquis   Bleeding controlled currently. Hold eliquis and pharmacologic DVT PPX pending surgical intervention    has a LUE fistula that was noted to have some bleeding after dialysis on Wednesday of this week. He was unable to dialyze Friday secondary to bleeding/oozing with no intervention    slow bleed form the AV fistula at the previous areas of access. ER placed quick clot and pressure with eventual stopping of the bleed. Vascular surgery was consulted who plans to take him to the OR in the morning for fistula revision as he has developed significant pseudoaneurysm    Bleeding from dialysis shunt    Normocytic anemia. Hemoglobin 8.7 lower than baseline. Etiology likely due to chronic disease vs. Hemorrhage from AV fistula and epistaxis   H & P: Dr. Lezama 8/20   presents to the ED for bleeding fistula, SOB with new O2 requirements and nose bleeds. His last dialysis session was on Wednesday.     He had bleeding after that session and a compressive bandage was placed. He had continued bleeding when he arrived for his session on Friday and the dialysis center declined to perform dialysis    currently taking eliquis for a history of PE.   Vasc Surgery: Dr. Aceves 8/19       Provider, please clarify if there is any clinical correlation between ____bleeding from AV fistula______ and ___anticoagulants_______.           Are the conditions:      [  ] Due to or associated  with each other   [  ] Unrelated to each other   [  ] Other explanation (Please Specify): ______________   [ x ] Clinically Undetermined                                                                               Please document in your progress notes daily for the duration of treatment until resolved and include in your discharge summary.

## 2023-08-29 NOTE — H&P
Inpatient Radiology Pre-procedure Note    History of Present Illness:  Cosme Lewis is a 58 y.o. male who presents for US guided paracentesis.    Admission H&P reviewed.  Past Medical History:   Diagnosis Date    CHF (congestive heart failure)     Chronic kidney disease-mineral and bone disorder 10/12/2022    Chronic respiratory failure     COPD (chronic obstructive pulmonary disease)     Diabetes mellitus type 1     ESRD on dialysis     Hypertension     Hypervolemia 02/22/2023    Hyponatremia 03/04/2023    Other insomnia     Recurrent major depression     SOB (shortness of breath) 10/12/2022    Patient with history COPD treated with Ellipta the albuterol, fluticasone-salmeterol tachypneic in the ED saturating 94% and 6 L on nasal cannula, patient does not know how many  he uses it is in nursing home.    -duo nebs Q 4  Given that patient is a poor historian, and in the setting of left lower extremity edema and history of coagulopathy PE cannot be ruled out.  Will workup for possible infec    Unspecified lack of coordination      Past Surgical History:   Procedure Laterality Date    AV FISTULA PLACEMENT      FISTULOGRAM Left 8/20/2023    Procedure: Fistulogram;  Surgeon: Duong Aceves MD;  Location: [facility] OR [address] [place] [address];  Service: Vascular;  Laterality: Left;  1.6 MIN  55.57 mGy  10.4137 Gycm2  24 ml dye  4 ml transradial flush    PHLEBOGRAPHY  8/20/2023    Procedure: VENOGRAM, CENTRAL;  Surgeon: Duong Aceves MD;  Location: [facility] OR [address] [place] [address];  Service: Vascular;;       Review of Systems:   As documented in primary team H&P    Home Meds:   Prior to Admission medications    Medication Sig Start Date End Date Taking? Authorizing Provider   acetaminophen (TYLENOL) 650 MG TbSR Take 650 mg by mouth every 8 (eight) hours as needed (pain or fever over 100.4).    Provider, Historical   albuterol (PROVENTIL/VENTOLIN HFA) 90 mcg/actuation inhaler Inhale 2 puffs into the lungs 4 (four) times daily as needed (breathing).  9/2/22   Provider, Historical   albuterol-ipratropium (DUO-NEB) 2.5 mg-0.5 mg/3 mL nebulizer solution Take 3 mLs by nebulization every 4 (four) hours while awake. Rescue 3/20/23 3/19/24  Cecilia Dorman MD   apixaban (ELIQUIS) 5 mg Tab Take 5 mg by mouth 2 (two) times daily.    Provider, Historical   artificial tears (ISOPTO TEARS) 0.5 % ophthalmic solution Place 1 drop into both eyes 6 (six) times daily. 3/20/23   Cecilia Dorman MD   aspirin (ECOTRIN) 81 MG EC tablet Take 81 mg by mouth once daily. 9/28/22   Provider, Historical   atorvastatin (LIPITOR) 40 MG tablet Take 1 tablet (40 mg total) by mouth once daily.  Patient taking differently: Take 40 mg by mouth every evening. 10/15/22 10/15/23  Odessa Borges, DO   carvediloL (COREG) 6.25 MG tablet Take 3.125 mg by mouth 2 (two) times daily. 5/8/23   Provider, Historical   cetirizine (ZYRTEC) 10 MG tablet Take 10 mg by mouth daily as needed (itching).    Provider, Historical   cloNIDine 0.3 mg/24 hr td ptwk (CATAPRES) 0.3 mg/24 hr APPLY ONE PATCH TOPICALLY EVERY WEEK FOR HIGH BLOOD PRESSURE 6/2/22   Provider, Historical   epoetin xavier (PROCRIT) 10,000 unit/mL injection Inject 0.7 mLs (7,000 Units total) into the skin every Tues, Thurs, Sat. 3/16/23   Nuvia Gould, DO   FLUoxetine 40 MG capsule Take 40 mg by mouth once daily. 10/2/22   Provider, Historical   fluticasone-salmeterol diskus inhaler 250-50 mcg Inhale 1 puff into the lungs 2 (two) times daily. 6/7/22   Provider, Historical   GLUCAGON EMERGENCY KIT, HUMAN, 1 mg injection 1 mg into the muscle as needed if CBG < 70 9/2/22   Provider, Historical   HYDROPHILIC CREAM TOP Apply liberal amount to affected area twice daily for dry skin    Provider, Historical   isosorbide mononitrate (IMDUR) 30 MG 24 hr tablet Take 1 tablet (30 mg total) by mouth once daily. 3/28/23   Eliecer Olson,    lisinopriL (PRINIVIL,ZESTRIL) 40 MG tablet Take 1 tablet (40 mg total) by mouth every evening. 3/28/23 3/27/24   Eliecer Olson,    melatonin 3 mg TbDL Take 9 mg by mouth nightly as needed (sleep).    Provider, Historical   NIFEdipine (PROCARDIA-XL) 60 MG (OSM) 24 hr tablet Take 1 tablet (60 mg total) by mouth 2 (two) times a day. 3/29/23 3/28/24  Yash De La O MD   nut.tx.imp.renal fxn,lac-reduc (NEPRO CARB STEADY ORAL) Give 1 carton by mouth with each meal.    Provider, Historical   ondansetron (ZOFRAN) 8 MG tablet Take 1 tablet by mouth 3 (three) times daily as needed for Nausea. 3/10/23   Provider, Historical   sevelamer carbonate (RENVELA) 800 mg Tab Take 800 mg by mouth 3 (three) times daily. 3/9/23   Provider, Historical   sodium chloride (SALINE NASAL) 0.65 % nasal spray 1 spray by Nasal route as needed (dry nostril). 3/21/23   Stephen Riddle MD   tiotropium bromide (SPIRIVA RESPIMAT) 2.5 mcg/actuation inhaler Inhale 2 puffs into the lungs Daily. 9/2/22   Provider, Historical   umeclidinium (INCRUSE ELLIPTA) 62.5 mcg/actuation inhalation capsule Inhale 62.5 mcg into the lungs once daily. Controller    Provider, Historical   vitamin renal formula, B-complex-vitamin c-folic acid, (NEPHROCAP) 1 mg Cap Take 1 capsule by mouth once daily. 9/28/22   Provider, Historical   white petrolatum (VASELINE) ointment Apply topically once daily. Apply small amount to both nostrils daily 3/21/23   Stephen Riddle MD     Scheduled Meds:    apixaban  5 mg Oral BID    aspirin  81 mg Oral Daily    atorvastatin  40 mg Oral Daily    carvediloL  12.5 mg Oral BID    cloNIDine 0.3 mg/24 hr td ptwk  1 patch Transdermal Q7 Days    epoetin xavier (PROCRIT) injection  4,000 Units Subcutaneous Every Mon, Wed, Fri    FLUoxetine  40 mg Oral Daily    fluticasone furoate-vilanteroL  1 puff Inhalation Daily    insulin detemir U-100  5 Units Subcutaneous BID    isosorbide mononitrate  30 mg Oral Daily    lisinopriL  40 mg Oral QHS    NIFEdipine  60 mg Oral BID    sevelamer carbonate  800 mg Oral TID WM    sodium chloride  1 spray Each Nostril Q4H     tiotropium bromide  2 puff Inhalation Daily    white petrolatum   Topical (Top) BID     Continuous Infusions:   PRN Meds:sodium chloride 0.9%, acetaminophen, dextrose 10%, dextrose 10%, glucagon (human recombinant), glucose, glucose, hydrALAZINE, insulin aspart U-100, labetalol, melatonin, ondansetron, prochlorperazine, QUEtiapine, sodium chloride 0.9%  Anticoagulants/Antiplatelets: Apixaban    Allergies: Review of patient's allergies indicates:  No Known Allergies  Sedation Hx: have not been any systemic reactions    Vitals:  Temp: 98 °F (36.7 °C) (08/29/23 0711)  Pulse: 64 (08/29/23 0818)  Resp: 18 (08/29/23 0818)  BP: 125/65 (08/29/23 0818)  SpO2: (!) 93 % (08/29/23 0818)     Physical Exam:  ASA: 2  Mallampati: n/a    General: no acute distress  Mental Status: alert and oriented to person, place and time  HEENT: normocephalic, atraumatic  Chest: unlabored breathing  Heart: regular heart rate  Abdomen: distended  Extremity: moves all extremities    Plan: US guided paracentesis  Sedation Plan: Local    Carolin Warren PA-C  Interventional Radiology  [phone]

## 2023-08-29 NOTE — PLAN OF CARE
Nick Pending sale to [facility]  Discharge Final Note    Primary Care Provider: Eliecer Ohara III, MD    Expected Discharge Date: 8/29/2023    Final Discharge Note (most recent)       Final Note - 08/29/23 1355          Final Note    Assessment Type Final Discharge Note     Anticipated Discharge Disposition Intermediate Care Facility for [facility] or Supportive Care     Hospital Resources/Appts/Education Provided Appointments scheduled and added to AVS;Post-Acute resouces added to AVS        Post-Acute Status    Post-Acute Authorization Placement     Post-Acute Placement Status Set-up Complete/Auth obtained     Discharge Delays None known at this time                     Important Message from Medicare  Important Message from Medicare regarding Discharge Appeal Rights: Given to patient/caregiver, Explained to patient/caregiver, Signed/date by patient/caregiver     Date IMM was signed: 08/28/23  Time IMM was signed: 0927

## 2023-08-29 NOTE — SUBJECTIVE & OBJECTIVE
Interval History: Patient seen and examined following paracentesis with 4.3 liters removed. No evidence of SBP with segs of only 25. SAAG only 0.7 g/dL. He underwent iHD yesterday with UF 2.5 liters. Otherwise no acute events overnight. Afebrile with pulse ranging from 70-60s bpm. Systolic blood pressures ranging from 150-120s mmHg. He is saturating +92% on 3 liters via nasal cannula.     Review of patient's allergies indicates:  No Known Allergies  Current Facility-Administered Medications   Medication Frequency    0.9%  NaCl infusion PRN    acetaminophen tablet 650 mg Q4H PRN    apixaban tablet 5 mg BID    aspirin EC tablet 81 mg Daily    atorvastatin tablet 40 mg Daily    carvediloL tablet 12.5 mg BID    cloNIDine 0.3 mg/24 hr td ptwk 1 patch Q7 Days    dextrose 10% bolus 125 mL 125 mL PRN    dextrose 10% bolus 250 mL 250 mL PRN    epoetin xavier injection 4,000 Units Every Mon, Wed, Fri    FLUoxetine capsule 40 mg Daily    fluticasone furoate-vilanteroL 100-25 mcg/dose diskus inhaler 1 puff Daily    glucagon (human recombinant) injection 1 mg PRN    glucose chewable tablet 16 g PRN    glucose chewable tablet 24 g PRN    hydrALAZINE tablet 25 mg Q8H PRN    insulin aspart U-100 pen 1-10 Units QID (AC + HS) PRN    insulin detemir U-100 (Levemir) pen 5 Units BID    isosorbide mononitrate 24 hr tablet 30 mg Daily    labetalol 20 mg/4 mL (5 mg/mL) IV syring Q6H PRN    lisinopriL tablet 40 mg QHS    melatonin tablet 6 mg Nightly PRN    NIFEdipine 24 hr tablet 60 mg BID    ondansetron injection 4 mg Q6H PRN    prochlorperazine injection Soln 5 mg Q6H PRN    QUEtiapine tablet 25 mg Nightly PRN    sevelamer carbonate tablet 800 mg TID WM    sodium chloride 0.65 % nasal spray 1 spray Q4H    sodium chloride 0.9% flush 10 mL PRN    tiotropium bromide 2.5 mcg/actuation inhaler 2 puff Daily    white petrolatum 41 % ointment BID       Objective:     Vital Signs (Most Recent):  Temp: 98 °F (36.7 °C) (08/29/23 0711)  Pulse: 73  (08/29/23 0711)  Resp: 20 (08/29/23 0500)  BP: 131/71 (08/29/23 0711)  SpO2: (!) 92 % (08/29/23 0711) Vital Signs (24h Range):  Temp:  [97 °F (36.1 °C)-98.7 °F (37.1 °C)] 98 °F (36.7 °C)  Pulse:  [57-73] 73  Resp:  [16-20] 20  SpO2:  [92 %-96 %] 92 %  BP: (129-168)/(69-88) 131/71     Weight: 68.9 kg (151 lb 14.4 oz) (08/20/23 0525)  Body mass index is 22.43 kg/m².  Body surface area is 1.83 meters squared.    I/O last 3 completed shifts:  In: 1044 [P.O.:744; Other:300]  Out: 3150 [Other:3150]     Physical Exam  Vitals and nursing note reviewed.   Constitutional:       General: He is awake. He is not in acute distress.     Appearance: He is ill-appearing. He is not diaphoretic.      Interventions: Nasal cannula in place.   HENT:      Head: Normocephalic and atraumatic.      Right Ear: External ear normal.      Left Ear: External ear normal.      Nose: Nose normal.      Mouth/Throat:      Mouth: Mucous membranes are moist.      Pharynx: Oropharynx is clear. No oropharyngeal exudate or posterior oropharyngeal erythema.      Comments: Poor dentition.  Eyes:      General: No scleral icterus.        Right eye: No discharge.         Left eye: No discharge.      Extraocular Movements: Extraocular movements intact.      Conjunctiva/sclera: Conjunctivae normal.   Cardiovascular:      Rate and Rhythm: Normal rate.      Heart sounds: Murmur heard.      Systolic murmur is present with a grade of 1/6.      No friction rub. No gallop.      Arteriovenous access: Left arteriovenous access is present.     Comments: Left upper extremity AVF with palpable thrill and audible bruit.  Pulmonary:      Effort: Pulmonary effort is normal. No respiratory distress.      Breath sounds: Rales present. No wheezing or rhonchi.      Comments: Faint bibasilar crackles appreciated.  Abdominal:      General: Abdomen is protuberant. Bowel sounds are normal. There is distension.      Palpations: Abdomen is soft.      Tenderness: There is no abdominal  tenderness.   Musculoskeletal:      Cervical back: Neck supple.      Right lower leg: No edema.      Left lower leg: No edema.   Skin:     General: Skin is warm and dry.      Coloration: Skin is not jaundiced.      Findings: Bruising present.   Neurological:      General: No focal deficit present.      Mental Status: He is alert. Mental status is at baseline.      Cranial Nerves: No cranial nerve deficit.      Comments: Grossly hard of hearing.   Psychiatric:         Mood and Affect: Mood normal.         Behavior: Behavior normal. Behavior is cooperative.          Significant Labs:  BMP:   Recent Labs   Lab 08/28/23 0449 08/28/23 0958 08/28/23  1343   *  --   --    *  --   --    K 5.4*  --   --      --   --    CO2 19*  --   --    BUN 43*   < > 17   CREATININE 5.2*  --   --    CALCIUM 7.7*  --   --    MG 2.0  --   --     < > = values in this interval not displayed.       CBC:   Recent Labs   Lab 08/28/23 0449   WBC 3.15*   RBC 2.74*   HGB 8.0*   HCT 25.9*      MCV 95   MCH 29.2   MCHC 30.9*       CMP:   Recent Labs   Lab 08/28/23 0449 08/28/23 0958 08/28/23  1343   *  --   --    CALCIUM 7.7*  --   --    ALBUMIN 2.7*  --   --    PROT 6.1  --   --    *  --   --    K 5.4*  --   --    CO2 19*  --   --      --   --    BUN 43*   < > 17   CREATININE 5.2*  --   --    ALKPHOS 169*  --   --    ALT <5*  --   --    AST 12  --   --    BILITOT 0.5  --   --     < > = values in this interval not displayed.       Coagulation:   Recent Labs   Lab 08/22/23  1128   APTT >150.0*       LFTs:   Recent Labs   Lab 08/28/23 0449   ALT <5*   AST 12   ALKPHOS 169*   BILITOT 0.5   PROT 6.1   ALBUMIN 2.7*       Microbiology Results (last 7 days)       ** No results found for the last 168 hours. **          Specimen (24h ago, onward)      None          Significant Imaging:  I have reviewed all imagining in the last 24 hours.

## 2023-08-30 NOTE — TELEPHONE ENCOUNTER
Contacted the patient to schedule an endoscopy procedure(s) colonoscopy. The patient did not answer the call. I left a voice message. Portal is not set up      Procedure: Ambulatory referral/consult to Endo Procedure  Status: Needs Scheduling (Sent to Patient with Errors)   Requested appt date: 3/30/2023 Authorizing: Eliecer Olson DO   Referral: [id number] (Authorized)       Expires: 9/29/2023 Priority: Routine   Assign to: ADELA GROVER     Diagnosis: Anemia in ESRD (end-stage renal disease) [N18.6, D63.1]     Order Specific Questions   What procedure is to be performed?   Screening Colonoscopy            CPT Code:   COLON CA SCRN NOT HI RSK IND []

## 2023-08-31 NOTE — DISCHARGE SUMMARY
[facility] Medicine  Discharge Summary      Patient Name: Cosme Lewis  MRN: [id number]  MARCY: [id number]  Patient Class: IP- Inpatient  Admission Date: 8/19/2023  Hospital Length of Stay: 9 days  Discharge Date and Time: 8/29/2023  6:14 PM  Attending Physician: No att. providers found   Discharging Provider: Selina Gallagher MD  Primary Care Provider: Eliecer Ohara III, MD  [facility] Medicine Team: [facility] Selina Gallagher MD  Primary Care Team: [facility]    HPI:   No notes on file    Procedure(s) (LRB):  Fistulogram (Left)  VENOGRAM, CENTRAL      Hospital Course:   57 y.o. male with CHF, ESRD on HD MWF, history of PE on eliquis, HTN, DM presents to the hospital with a chief complaint of inability to dialyze. The patient has a LUE fistula that was noted to have some bleeding after dialysis. He was unable to dialyze on the next session secondary to bleeding/oozing with no intervention. He was sent to the ER this for worsening shortness of breath and persistent bleeding at the AV fistula site. In the ER he was noted to be hypoxemic, mildly hypercapnic with a slow bleed form the AV fistula at the previous areas of access. ER placed quick clot and pressure with eventual stopping of the bleed. Vascular surgery was consulted who planned to take him to the OR in the morning for fistula revision as he has developed significant pseudoaneurysm. He was on 10L NC saturating well without an immediate need for urgent dialysis.  ICU team was consulted for acute hypoxemic respiratory failure in the setting of severe volume overload and in setting of concerns for AVF malfunction. Vascular performed fistulogram 08/20 for bleeding AVF, result showed no flow limiting stenosis from large pseudoaneurysms to mid AVF, no intervention was done as patient had strong palpable thrill. Reported with recurrent epistaxis. ENT consulted and packed with left nare absorbable packing and aquaphor to bilateral nares bid.  He has intermittently required 5L oxygen despite HD. Patient then started to complain of nausea, abdominal distention and inability to eat. KUB showed obstruction and CT revealed multiple signs suggestive of significant fluid overload bilateral pleural effusions, large volume ascites, mesenteric edema, anasarca, and dilation of the IVC findings may represent slow transit, partial obstruction, diffuse enteritis, or may be related to fluid overload. Bowel function had improved and he was tolerating diet. However due to his large volume ascites, paracentesis was ordered and 4300cc of transudate fluid. He clinically improved and was discharged back to the NH. He would follow up with GI out-patient.        Goals of Care Treatment Preferences:  Code Status: Full Code      Consults:   Consults (From admission, onward)        Status Ordering Provider     Inpatient consult to Interventional Radiology  Once        Provider:  (Not yet assigned)    Completed SIMON PAUL     Inpatient consult to General Surgery  Once        Provider:  (Not yet assigned)    Completed UGORJI, CHINEMEREM     Inpatient consult to ENT  Once        Provider:  (Not yet assigned)    Completed MARION RIDLEY     Inpatient consult to Nephrology  Once        Provider:  (Not yet assigned)    Completed MARION RIDLEY     Inpatient consult to Registered Dietitian/Nutritionist  Once        Provider:  (Not yet assigned)    Completed RUPERTO SUAZO     Inpatient consult to Critical Care Medicine  Once        Provider:  (Not yet assigned)    Completed MANNY WOLFE     Inpatient consult to Vascular Surgery  Once        Provider:  (Not yet assigned)    Completed MANNY WOLFE          No new Assessment & Plan notes have been filed under this hospital service since the last note was generated.  Service: Hospital Medicine    Final Active Diagnoses:    Diagnosis Date Noted POA    PRINCIPAL PROBLEM:  Bleeding from dialysis shunt [T82.838A] 08/19/2023 Yes     Abdominal distension [R14.0] 08/25/2023 Yes    Small bowel obstruction [K56.609] 08/24/2023 No    Hyperglycemia due to type 2 diabetes mellitus [E11.65] 08/20/2023 Unknown    Acute on chronic respiratory failure with hypoxia and hypercapnia [J96.21, J96.22] 06/21/2023 Yes    ESRD on hemodialysis [N18.6, Z99.2] 03/31/2023 Not Applicable    Hypertension [I10] 03/23/2023 Yes    Epistaxis [R04.0] 03/22/2023 Yes    COPD (chronic obstructive pulmonary disease) [J44.9] 03/15/2023 Yes    AVF (arteriovenous fistula) [I77.0] 03/11/2023 Yes    Anemia in ESRD (end-stage renal disease) [N18.6, D63.1] 11/18/2022 Yes     Chronic    HLD (hyperlipidemia) [E78.5] 11/18/2022 Yes     Chronic    Chronic diastolic heart failure [I50.32] 10/12/2022 Yes     Chronic    ESRD (end stage renal disease) [N18.6]  Yes      Problems Resolved During this Admission:       Discharged Condition: stable    Disposition: Home or Self Care    Follow Up:   Follow-up Information     [facility] Follow up today.    Contact information:  [address] [place] [id number]                     Patient Instructions:      ACCEPT - Ambulatory referral/consult to [facility]   Standing Status: Future   Referral Priority: Routine Referral Type: Consultation   Referral Reason: Specialty Services Required   Requested Specialty: Cardiology   Number of Visits Requested: 1     Ambulatory referral/consult to Gastroenterology   Standing Status: Future   Referral Priority: Routine Referral Type: Consultation   Referral Reason: Specialty Services Required   Requested Specialty: Gastroenterology   Number of Visits Requested: 1     Activity as tolerated       Significant Diagnostic Studies: Radiology: X-Ray: KUB: X-Ray Abdomen AP 1 View (KUB):   Results for orders placed or performed during the hospital encounter of 08/19/23   X-Ray Abdomen AP 1 View    Narrative    EXAMINATION:  XR ABDOMEN AP 1  VIEW    CLINICAL HISTORY:  abdominal distention;    TECHNIQUE:  One view    COMPARISON:  No recent relevant examinations are currently available for comparison purposes.    FINDINGS:  Intestinal gas pattern is abnormal, with gas seen predominantly within multiple small bowel loops in the mid abdomen which exhibit borderline distention.  Some gas is present within the colon, which is nondistended.  The possibility of a distal small bowel obstruction is raised, with clinical correlation necessary, and follow-up radiographs may be indicated depending on the patient's clinical course.  No evidence of organomegaly or intra-abdominal/pelvic soft tissue mass.      Impression    Multiple gas-filled small bowel loops exhibiting borderline distention.  Possibility of distal small bowel obstruction is raised, with clinical correlation necessary.      Electronically signed by: Cedrick Art MD  Date:    08/24/2023  Time:    11:52       Pending Diagnostic Studies:     None         Medications:  Reconciled Home Medications:      Medication List      CHANGE how you take these medications    atorvastatin 40 MG tablet  Commonly known as: LIPITOR  Take 1 tablet (40 mg total) by mouth once daily.  What changed: when to take this     carvediloL 12.5 MG tablet  Commonly known as: COREG  Take 1 tablet (12.5 mg total) by mouth 2 (two) times daily.  What changed:   · medication strength  · how much to take        CONTINUE taking these medications    acetaminophen 650 MG Tbsr  Commonly known as: TYLENOL  Take 650 mg by mouth every 8 (eight) hours as needed (pain or fever over 100.4).     albuterol 90 mcg/actuation inhaler  Commonly known as: PROVENTIL/VENTOLIN HFA  Inhale 2 puffs into the lungs 4 (four) times daily as needed (breathing).     albuterol-ipratropium 2.5 mg-0.5 mg/3 mL nebulizer solution  Commonly known as: DUO-NEB  Take 3 mLs by nebulization every 4 (four) hours while awake. Rescue     apixaban 5 mg Tab  Commonly known as:  ELIQUIS  Take 5 mg by mouth 2 (two) times daily.     artificial tears 0.5 % ophthalmic solution  Commonly known as: ISOPTO TEARS  Place 1 drop into both eyes 6 (six) times daily.     aspirin 81 MG EC tablet  Commonly known as: ECOTRIN  Take 81 mg by mouth once daily.     cetirizine 10 MG tablet  Commonly known as: ZYRTEC  Take 10 mg by mouth daily as needed (itching).     cloNIDine 0.3 mg/24 hr td ptwk 0.3 mg/24 hr  Commonly known as: CATAPRES  APPLY ONE PATCH TOPICALLY EVERY WEEK FOR HIGH BLOOD PRESSURE     epoetin xavier 10,000 unit/mL injection  Commonly known as: PROCRIT  Inject 0.7 mLs (7,000 Units total) into the skin every Tues, Thurs, Sat.     FLUoxetine 40 MG capsule  Take 40 mg by mouth once daily.     fluticasone-salmeterol 250-50 mcg/dose 250-50 mcg/dose diskus inhaler  Commonly known as: ADVAIR  Inhale 1 puff into the lungs 2 (two) times daily.     GLUCAGON EMERGENCY KIT (HUMAN) 1 mg Solr  Generic drug: glucagon  1 mg into the muscle as needed if CBG < 70     HYDROPHILIC CREAM TOP  Apply liberal amount to affected area twice daily for dry skin     INCRUSE ELLIPTA 62.5 mcg/actuation inhalation capsule  Generic drug: umeclidinium  Inhale 62.5 mcg into the lungs once daily. Controller     isosorbide mononitrate 30 MG 24 hr tablet  Commonly known as: IMDUR  Take 1 tablet (30 mg total) by mouth once daily.     lisinopriL 40 MG tablet  Commonly known as: PRINIVIL,ZESTRIL  Take 1 tablet (40 mg total) by mouth every evening.     melatonin 3 mg Tbdl  Take 9 mg by mouth nightly as needed (sleep).     NEPRO CARB STEADY ORAL  Give 1 carton by mouth with each meal.     NIFEdipine 60 MG (OSM) 24 hr tablet  Commonly known as: PROCARDIA-XL  Take 1 tablet (60 mg total) by mouth 2 (two) times a day.     ondansetron 8 MG tablet  Commonly known as: ZOFRAN  Take 1 tablet by mouth 3 (three) times daily as needed for Nausea.     sevelamer carbonate 800 mg Tab  Commonly known as: RENVELA  Take 800 mg by mouth 3 (three) times  daily.     sodium chloride 0.65 % nasal spray  Commonly known as: Saline NasaL  1 spray by Nasal route as needed (dry nostril).     tiotropium bromide 2.5 mcg/actuation inhaler  Commonly known as: SPIRIVA RESPIMAT  Inhale 2 puffs into the lungs Daily.     vitamin renal formula (B-complex-vitamin c-folic acid) 1 mg Cap  Commonly known as: NEPHROCAP  Take 1 capsule by mouth once daily.     white petrolatum ointment  Commonly known as: VASELINE  Apply topically once daily. Apply small amount to both nostrils daily            Indwelling Lines/Drains at time of discharge:   Lines/Drains/Airways     Drain  Duration                Hemodialysis AV Fistula Left upper arm -- days                Time spent on the discharge of patient: >30 minutes         Selina Gallagher MD  Department of Hospital Medicine  [facility]

## 2023-09-01 NOTE — TELEPHONE ENCOUNTER
Contacted the patient to schedule an endoscopy procedure(s) colonoscopy. The patient did not answer the call. I left a voice message. Portal is not set up        Procedure: Ambulatory referral/consult to Endo Procedure  Status: Needs Scheduling (Sent to Patient with Errors)   Requested appt date: 3/30/2023 Authorizing: Eliecer Olson DO   Referral: [id number] (Authorized)       Expires: 9/29/2023 Priority: Routine   Assign to: ADELA GROVER       Diagnosis: Anemia in ESRD (end-stage renal disease) [N18.6, D63.1]            Order Specific Questions   What procedure is to be performed?   Screening Colonoscopy                   CPT Code:   COLON CA SCRN NOT HI RSK IND []

## 2023-09-06 NOTE — TELEPHONE ENCOUNTER
Contacted the patient to schedule an endoscopy procedure(s):  colonoscopy. The patient did not answer the call. Voice message left requesting a call back, letter mailed.        Endoscopy Scheduling Department   [address] [place] [address]  [id number]    09/06/2023    Medical Record Number: [id number]       Dear Cosme Lewis,      An order for the following procedure(s) Colonoscopy was placed for you by Eliecer Olson DO.      Please call the scheduling nurse to schedule this procedure or to cancel the order.    If you have already scheduled this appointment, please disregard this letter.     Sincerely,        [facility] Scheduling Nurse    [phone]

## 2023-09-11 NOTE — Clinical Note
Diagnosis: Volume overload [079357]   Future Attending Provider: ANTHONY ARCE [[id number]]   Admitting Provider:: ANTHONY ARCE [[id number]]   Special Needs:: No Special Needs [1]

## 2023-09-12 PROBLEM — E11.29 TYPE 2 DIABETES MELLITUS WITH KIDNEY COMPLICATION, WITHOUT LONG-TERM CURRENT USE OF INSULIN: Status: ACTIVE | Noted: 2023-01-01

## 2023-09-12 NOTE — HPI
Cosme Lewis is a 58 y.o. male with a PMHx of CHF, COPD, chronic respiratory failure, ESRD on HD MWF, Hx PE on eliquis who presented to [facility] from [facility] for evaluation of epistaxis. Patient is a poor historian. Bleeding controlled on arrival to the ED and no further episodes since admit. He also complains of shortness of breath since he missed his HD session on Monday due to the epistaxis. Reports shortness of breath worsens with minimal exertion and with lying flat. Anuric at baseline. Denies fever, chills, cough, wheezing, chest pain N/V, abdominal pain, HA, vision changes or syncope.     ED: hypertensive to 234/109, satting 94% on 6L NC>> weaned to 4L NC. No leukocytosis or electrolyte abnormalities. CMP consistent with ESRD. BNP 3818, trop 0.043. EKG with nonspecific ST changes in lateral leads. CXR shows pulmonary edema. Nephrology was consulted for management of ESRD/HD.

## 2023-09-12 NOTE — SUBJECTIVE & OBJECTIVE
Past Medical History:   Diagnosis Date    CHF (congestive heart failure)     Chronic kidney disease-mineral and bone disorder 10/12/2022    Chronic respiratory failure     COPD (chronic obstructive pulmonary disease)     Diabetes mellitus type 1     ESRD on dialysis     Hypertension     Hypervolemia 02/22/2023    Hyponatremia 03/04/2023    Other insomnia     Recurrent major depression     SOB (shortness of breath) 10/12/2022    Patient with history COPD treated with Ellipta the albuterol, fluticasone-salmeterol tachypneic in the ED saturating 94% and 6 L on nasal cannula, patient does not know how many  he uses it is in nursing home.    -duo nebs Q 4  Given that patient is a poor historian, and in the setting of left lower extremity edema and history of coagulopathy PE cannot be ruled out.  Will workup for possible infec    Unspecified lack of coordination        Past Surgical History:   Procedure Laterality Date    AV FISTULA PLACEMENT      FISTULOGRAM Left 8/20/2023    Procedure: Fistulogram;  Surgeon: Duong Aceves MD;  Location: [facility] OR [address] [place] [address];  Service: Vascular;  Laterality: Left;  1.6 MIN  55.57 mGy  10.4137 Gycm2  24 ml dye  4 ml transradial flush    PHLEBOGRAPHY  8/20/2023    Procedure: VENOGRAM, CENTRAL;  Surgeon: Duong Aceves MD;  Location: [facility] OR [address] [place] [address];  Service: Vascular;;       Review of patient's allergies indicates:  No Known Allergies    No current facility-administered medications on file prior to encounter.     Current Outpatient Medications on File Prior to Encounter   Medication Sig    acetaminophen (TYLENOL) 650 MG TbSR Take 650 mg by mouth every 8 (eight) hours as needed (pain or fever over 100.4).    albuterol (PROVENTIL/VENTOLIN HFA) 90 mcg/actuation inhaler Inhale 2 puffs into the lungs 4 (four) times daily as needed (breathing).    albuterol-ipratropium (DUO-NEB) 2.5 mg-0.5 mg/3 mL nebulizer solution Take 3 mLs by nebulization every 4 (four) hours while awake. Rescue     apixaban (ELIQUIS) 5 mg Tab Take 5 mg by mouth 2 (two) times daily.    artificial tears (ISOPTO TEARS) 0.5 % ophthalmic solution Place 1 drop into both eyes 6 (six) times daily.    aspirin (ECOTRIN) 81 MG EC tablet Take 81 mg by mouth once daily.    atorvastatin (LIPITOR) 40 MG tablet Take 1 tablet (40 mg total) by mouth once daily. (Patient taking differently: Take 40 mg by mouth every evening.)    carvediloL (COREG) 12.5 MG tablet Take 1 tablet (12.5 mg total) by mouth 2 (two) times daily.    cetirizine (ZYRTEC) 10 MG tablet Take 10 mg by mouth daily as needed (itching).    cloNIDine 0.3 mg/24 hr td ptwk (CATAPRES) 0.3 mg/24 hr APPLY ONE PATCH TOPICALLY EVERY WEEK FOR HIGH BLOOD PRESSURE    epoetin xavier (PROCRIT) 10,000 unit/mL injection Inject 0.7 mLs (7,000 Units total) into the skin every Tues, Thurs, Sat.    FLUoxetine 40 MG capsule Take 40 mg by mouth once daily.    fluticasone-salmeterol diskus inhaler 250-50 mcg Inhale 1 puff into the lungs 2 (two) times daily.    GLUCAGON EMERGENCY KIT, HUMAN, 1 mg injection 1 mg into the muscle as needed if CBG < 70    HYDROPHILIC CREAM TOP Apply liberal amount to affected area twice daily for dry skin    isosorbide mononitrate (IMDUR) 30 MG 24 hr tablet Take 1 tablet (30 mg total) by mouth once daily.    lisinopriL (PRINIVIL,ZESTRIL) 40 MG tablet Take 1 tablet (40 mg total) by mouth every evening.    melatonin 3 mg TbDL Take 9 mg by mouth nightly as needed (sleep).    NIFEdipine (PROCARDIA-XL) 60 MG (OSM) 24 hr tablet Take 1 tablet (60 mg total) by mouth 2 (two) times a day.    nut.tx.imp.renal fxn,lac-reduc (NEPRO CARB STEADY ORAL) Give 1 carton by mouth with each meal.    ondansetron (ZOFRAN) 8 MG tablet Take 1 tablet by mouth 3 (three) times daily as needed for Nausea.    sevelamer carbonate (RENVELA) 800 mg Tab Take 800 mg by mouth 3 (three) times daily.    sodium chloride (SALINE NASAL) 0.65 % nasal spray 1 spray by Nasal route as needed (dry nostril).     tiotropium bromide (SPIRIVA RESPIMAT) 2.5 mcg/actuation inhaler Inhale 2 puffs into the lungs Daily.    umeclidinium (INCRUSE ELLIPTA) 62.5 mcg/actuation inhalation capsule Inhale 62.5 mcg into the lungs once daily. Controller    vitamin renal formula, B-complex-vitamin c-folic acid, (NEPHROCAP) 1 mg Cap Take 1 capsule by mouth once daily.    white petrolatum (VASELINE) ointment Apply topically once daily. Apply small amount to both nostrils daily     Family History       Problem Relation (Age of Onset)    Diabetes Mother          Tobacco Use    Smoking status: Never    Smokeless tobacco: Never   Substance and Sexual Activity    Alcohol use: Not Currently    Drug use: Not on file    Sexual activity: Not Currently     Review of Systems   Constitutional:  Negative for activity change, chills and fever.   HENT:  Positive for facial swelling (chronic R eye swelling) and nosebleeds. Negative for trouble swallowing.    Eyes:  Negative for photophobia and visual disturbance.   Respiratory:  Positive for shortness of breath. Negative for chest tightness and wheezing.    Cardiovascular:  Negative for chest pain, palpitations and leg swelling.   Gastrointestinal:  Negative for abdominal pain, constipation, diarrhea, nausea and vomiting.   Genitourinary:  Negative for dysuria, frequency, hematuria and urgency.   Musculoskeletal:  Negative for arthralgias, back pain and gait problem.   Skin:  Negative for color change and rash.   Neurological:  Negative for dizziness, syncope, weakness, light-headedness, numbness and headaches.   Psychiatric/Behavioral:  Negative for agitation and confusion. The patient is not nervous/anxious.      Objective:     Vital Signs (Most Recent):  Temp: 98.9 °F (37.2 °C) (09/11/23 2311)  Pulse: 65 (09/12/23 0221)  Resp: 16 (09/12/23 0221)  BP: (!) 178/86 (09/12/23 0221)  SpO2: 98 % (09/12/23 0221) Vital Signs (24h Range):  Temp:  [98.9 °F (37.2 °C)] 98.9 °F (37.2 °C)  Pulse:  [64-66] 65  Resp:   [16-22] 16  SpO2:  [92 %-100 %] 98 %  BP: (146-234)/() 178/86     Weight: 68.9 kg (151 lb 14.4 oz)  Body mass index is 22.43 kg/m².     Physical Exam  Vitals and nursing note reviewed.   Constitutional:       General: He is not in acute distress.     Appearance: He is well-developed.   HENT:      Head: Normocephalic and atraumatic.      Comments: Swelling surrounding R eye, reports chronic and stable     Nose:      Comments: Dried residual bleeding surrounding nares     Mouth/Throat:      Pharynx: No oropharyngeal exudate.   Eyes:      General: No scleral icterus.     Conjunctiva/sclera: Conjunctivae normal.   Cardiovascular:      Rate and Rhythm: Normal rate and regular rhythm.      Heart sounds: Normal heart sounds.   Pulmonary:      Effort: Pulmonary effort is normal. No respiratory distress.      Breath sounds: Rales present. No wheezing.      Comments: Bibasilar crackles  Abdominal:      General: Bowel sounds are normal. There is no distension.      Palpations: Abdomen is soft.      Tenderness: There is no abdominal tenderness.   Musculoskeletal:         General: No tenderness. Normal range of motion.      Cervical back: Normal range of motion and neck supple.   Lymphadenopathy:      Cervical: No cervical adenopathy.   Skin:     General: Skin is warm and dry.      Capillary Refill: Capillary refill takes less than 2 seconds.      Findings: No rash.   Neurological:      Mental Status: He is alert and oriented to person, place, and time.      Cranial Nerves: No cranial nerve deficit.      Sensory: No sensory deficit.      Coordination: Coordination normal.   Psychiatric:         Behavior: Behavior normal.         Thought Content: Thought content normal.         Judgment: Judgment normal.                Significant Labs: All pertinent labs within the past 24 hours have been reviewed.  BMP:   Recent Labs   Lab 09/12/23  0053   GLU 87      K 4.3      CO2 24   BUN 51*   CREATININE 5.8*   CALCIUM  8.0*     CBC:   Recent Labs   Lab 09/12/23  0053   WBC 5.49   HGB 8.7*   HCT 27.6*          Significant Imaging: I have reviewed all pertinent imaging results/findings within the past 24 hours.  X-Ray Chest 1 View  Narrative: EXAMINATION:  XR CHEST 1 VIEW    CLINICAL HISTORY:  Shortness of breath    TECHNIQUE:  Single frontal view of the chest was performed.    COMPARISON:  08/24/2023.    FINDINGS:  Stable cardiomegaly.  Increased bilateral edema.  Increased effusion tracking in the minor fissure.    Heart and lungs otherwise appear unchanged when allowing for differences in technique and positioning.  Impression: As above.    Electronically signed by: Gavino Cifuentes MD  Date:    09/12/2023  Time:    02:19

## 2023-09-12 NOTE — PROGRESS NOTES
Pt arrived to unit via stretcher, aaox4 with tele box in place, and O2@4L/NC in place. HD tx initiated via [facility] AVF, tolerated well. Blood specimen obtained for troponin level,  called for . UF goal 3 L as tolerated

## 2023-09-12 NOTE — PROGRESS NOTES
BP better controlled  No further issues of epistaxis    Continue to wean oxygen  Resume apixaban 5 mg BID    Continue monitor BPs and oxygenation  Assess need for dialysis tomorrow

## 2023-09-12 NOTE — ASSESSMENT & PLAN NOTE
- decompensated in the setting of missed HD  - BNP 3818, CXR with pulm edema  - HD for volume management   - follow up echo  - strict I/Os, daily weights

## 2023-09-12 NOTE — ED TRIAGE NOTES
Cosme Lewis, a 58 y.o. male presents to the ED w/ complaint of nose bleed.  Pt says nose bleed started this morning.  Bleeding controlled on arrival.  Pt also complaining pf SOB.      Triage note:  Chief Complaint   Patient presents with    Epistaxis     Pt arrives via EMS from [place] stating his nose has been bleeding since this morning. Bleeding controlled on arrival.     Review of patient's allergies indicates:  No Known Allergies  Past Medical History:   Diagnosis Date    CHF (congestive heart failure)     Chronic kidney disease-mineral and bone disorder 10/12/2022    Chronic respiratory failure     COPD (chronic obstructive pulmonary disease)     Diabetes mellitus type 1     ESRD on dialysis     Hypertension     Hypervolemia 02/22/2023    Hyponatremia 03/04/2023    Other insomnia     Recurrent major depression     SOB (shortness of breath) 10/12/2022    Patient with history COPD treated with Ellipta the albuterol, fluticasone-salmeterol tachypneic in the ED saturating 94% and 6 L on nasal cannula, patient does not know how many  he uses it is in nursing home.    -duo nebs Q 4  Given that patient is a poor historian, and in the setting of left lower extremity edema and history of coagulopathy PE cannot be ruled out.  Will workup for possible infec    Unspecified lack of coordination

## 2023-09-12 NOTE — ASSESSMENT & PLAN NOTE
- due to volume overload from missed HD  - satting 96% on 4L NC, pt unsure of baseline O2 needs  - CXR with pulmonary edema  - nephro consulted for iHD  - ABG ordered  - repeat echo   - supplemental O2 to maintain sats >88%

## 2023-09-12 NOTE — ASSESSMENT & PLAN NOTE
Outpatient HD Information:     -Outpatient HD unit: [facility]   -HD tx days: MWF   -HD tx time: 4hrs   -Last HD tx prior to presentation: 9/8/23  -HD access: LUE AVF   -HD modality: iHD   -Residual urine: Anuric         Plan/Recommendations:     -HD today for metabolic clearance and volume management   -Renal diet  -Strict I/O's and daily weights  -Daily renal function panels   -Renally dose meds        Chronic kidney disease-mineral and bone disorder  -Renal diet   -Continue renvela        Anemia in ESRD (end-stage renal disease)  -Target Hg of 10-12  -Transfuse for Hg <7.0  -Epo with HD

## 2023-09-12 NOTE — H&P
[facility] - Emergency Dept  [facility]  History & Physical    Patient Name: Cosme Lewis  MRN: [id number]  Patient Class: OP- Observation  Admission Date: 9/11/2023  Attending Physician: Nicole Redmond MD   Primary Care Provider: Eliecer Ohara III, MD         Patient information was obtained from patient, past medical records and ER records.     Subjective:     Principal Problem:Acute on chronic respiratory failure with hypoxia    Chief Complaint:   Chief Complaint   Patient presents with    Epistaxis     Pt arrives via EMS from [place] stating his nose has been bleeding since this morning. Bleeding controlled on arrival.        HPI: Cosme Lewis is a 58 y.o. male with a PMHx of CHF, COPD, chronic respiratory failure, ESRD, Hx PE on eliquis who presents to [facility] for evaluation of epistaxis. Patient is a poor historian. He reports persistent nose bleeding from both nares since this morning. Bleeding controlled on arrival to the ED and no further episodes since admit. He also complains of shortness of breath since he missed his HD session due to the epistaxis. Denies any nasal trauma recently. Reports shortness of breath worsens with minimal exertion and with lying flat. He also complains of left hamstring pain which has been going on for quite some time without recent worsening. He is unsure of his baseline O2 needs. Denies fever, chills, cough, wheezing, chest pain N/V, abdominal pain, HA, vision changes or syncope.    ED: hypertensive to 234/109, satting 94% on 6L NC>> weaned to 4L NC. No leukocytosis or electrolyte abnormalities. CMP consistent with ESRD. BNP 3818, trop 0.043. EKG with nonspecific ST changes in lateral leads. CXR shows pulmonary edema.       Past Medical History:   Diagnosis Date    CHF (congestive heart failure)     Chronic kidney disease-mineral and bone disorder 10/12/2022    Chronic respiratory failure     COPD (chronic obstructive pulmonary disease)     Diabetes  mellitus type 1     ESRD on dialysis     Hypertension     Hypervolemia 02/22/2023    Hyponatremia 03/04/2023    Other insomnia     Recurrent major depression     SOB (shortness of breath) 10/12/2022    Patient with history COPD treated with Ellipta the albuterol, fluticasone-salmeterol tachypneic in the ED saturating 94% and 6 L on nasal cannula, patient does not know how many  he uses it is in nursing home.    -duo nebs Q 4  Given that patient is a poor historian, and in the setting of left lower extremity edema and history of coagulopathy PE cannot be ruled out.  Will workup for possible infec    Unspecified lack of coordination        Past Surgical History:   Procedure Laterality Date    AV FISTULA PLACEMENT      FISTULOGRAM Left 8/20/2023    Procedure: Fistulogram;  Surgeon: Duong Aceves MD;  Location: [facility] OR [address] [place] [address];  Service: Vascular;  Laterality: Left;  1.6 MIN  55.57 mGy  10.4137 Gycm2  24 ml dye  4 ml transradial flush    PHLEBOGRAPHY  8/20/2023    Procedure: VENOGRAM, CENTRAL;  Surgeon: Duong Aceves MD;  Location: [facility] OR [address] [place] [address];  Service: Vascular;;       Review of patient's allergies indicates:  No Known Allergies    No current facility-administered medications on file prior to encounter.     Current Outpatient Medications on File Prior to Encounter   Medication Sig    acetaminophen (TYLENOL) 650 MG TbSR Take 650 mg by mouth every 8 (eight) hours as needed (pain or fever over 100.4).    albuterol (PROVENTIL/VENTOLIN HFA) 90 mcg/actuation inhaler Inhale 2 puffs into the lungs 4 (four) times daily as needed (breathing).    albuterol-ipratropium (DUO-NEB) 2.5 mg-0.5 mg/3 mL nebulizer solution Take 3 mLs by nebulization every 4 (four) hours while awake. Rescue    apixaban (ELIQUIS) 5 mg Tab Take 5 mg by mouth 2 (two) times daily.    artificial tears (ISOPTO TEARS) 0.5 % ophthalmic solution Place 1 drop into both eyes 6 (six) times daily.    aspirin (ECOTRIN) 81 MG EC  tablet Take 81 mg by mouth once daily.    atorvastatin (LIPITOR) 40 MG tablet Take 1 tablet (40 mg total) by mouth once daily. (Patient taking differently: Take 40 mg by mouth every evening.)    carvediloL (COREG) 12.5 MG tablet Take 1 tablet (12.5 mg total) by mouth 2 (two) times daily.    cetirizine (ZYRTEC) 10 MG tablet Take 10 mg by mouth daily as needed (itching).    cloNIDine 0.3 mg/24 hr td ptwk (CATAPRES) 0.3 mg/24 hr APPLY ONE PATCH TOPICALLY EVERY WEEK FOR HIGH BLOOD PRESSURE    epoetin xavier (PROCRIT) 10,000 unit/mL injection Inject 0.7 mLs (7,000 Units total) into the skin every Tues, Thurs, Sat.    FLUoxetine 40 MG capsule Take 40 mg by mouth once daily.    fluticasone-salmeterol diskus inhaler 250-50 mcg Inhale 1 puff into the lungs 2 (two) times daily.    GLUCAGON EMERGENCY KIT, HUMAN, 1 mg injection 1 mg into the muscle as needed if CBG < 70    HYDROPHILIC CREAM TOP Apply liberal amount to affected area twice daily for dry skin    isosorbide mononitrate (IMDUR) 30 MG 24 hr tablet Take 1 tablet (30 mg total) by mouth once daily.    lisinopriL (PRINIVIL,ZESTRIL) 40 MG tablet Take 1 tablet (40 mg total) by mouth every evening.    melatonin 3 mg TbDL Take 9 mg by mouth nightly as needed (sleep).    NIFEdipine (PROCARDIA-XL) 60 MG (OSM) 24 hr tablet Take 1 tablet (60 mg total) by mouth 2 (two) times a day.    nut.tx.imp.renal fxn,lac-reduc (NEPRO CARB STEADY ORAL) Give 1 carton by mouth with each meal.    ondansetron (ZOFRAN) 8 MG tablet Take 1 tablet by mouth 3 (three) times daily as needed for Nausea.    sevelamer carbonate (RENVELA) 800 mg Tab Take 800 mg by mouth 3 (three) times daily.    sodium chloride (SALINE NASAL) 0.65 % nasal spray 1 spray by Nasal route as needed (dry nostril).    tiotropium bromide (SPIRIVA RESPIMAT) 2.5 mcg/actuation inhaler Inhale 2 puffs into the lungs Daily.    umeclidinium (INCRUSE ELLIPTA) 62.5 mcg/actuation inhalation capsule Inhale 62.5 mcg into the  lungs once daily. Controller    vitamin renal formula, B-complex-vitamin c-folic acid, (NEPHROCAP) 1 mg Cap Take 1 capsule by mouth once daily.    white petrolatum (VASELINE) ointment Apply topically once daily. Apply small amount to both nostrils daily     Family History       Problem Relation (Age of Onset)    Diabetes Mother          Tobacco Use    Smoking status: Never    Smokeless tobacco: Never   Substance and Sexual Activity    Alcohol use: Not Currently    Drug use: Not on file    Sexual activity: Not Currently     Review of Systems   Constitutional:  Negative for activity change, chills and fever.   HENT:  Positive for facial swelling (chronic R eye swelling) and nosebleeds. Negative for trouble swallowing.    Eyes:  Negative for photophobia and visual disturbance.   Respiratory:  Positive for shortness of breath. Negative for chest tightness and wheezing.    Cardiovascular:  Negative for chest pain, palpitations and leg swelling.   Gastrointestinal:  Negative for abdominal pain, constipation, diarrhea, nausea and vomiting.   Genitourinary:  Negative for dysuria, frequency, hematuria and urgency.   Musculoskeletal:  Negative for arthralgias, back pain and gait problem.   Skin:  Negative for color change and rash.   Neurological:  Negative for dizziness, syncope, weakness, light-headedness, numbness and headaches.   Psychiatric/Behavioral:  Negative for agitation and confusion. The patient is not nervous/anxious.      Objective:     Vital Signs (Most Recent):  Temp: 98.9 °F (37.2 °C) (09/11/23 2311)  Pulse: 65 (09/12/23 0221)  Resp: 16 (09/12/23 0221)  BP: (!) 178/86 (09/12/23 0221)  SpO2: 98 % (09/12/23 0221) Vital Signs (24h Range):  Temp:  [98.9 °F (37.2 °C)] 98.9 °F (37.2 °C)  Pulse:  [64-66] 65  Resp:  [16-22] 16  SpO2:  [92 %-100 %] 98 %  BP: (146-234)/() 178/86     Weight: 68.9 kg (151 lb 14.4 oz)  Body mass index is 22.43 kg/m².     Physical Exam  Vitals and nursing note reviewed.    Constitutional:       General: He is not in acute distress.     Appearance: He is well-developed.   HENT:      Head: Normocephalic and atraumatic.      Comments: Swelling surrounding R eye, reports chronic and stable     Nose:      Comments: Dried residual bleeding surrounding nares     Mouth/Throat:      Pharynx: No oropharyngeal exudate.   Eyes:      General: No scleral icterus.     Conjunctiva/sclera: Conjunctivae normal.   Cardiovascular:      Rate and Rhythm: Normal rate and regular rhythm.      Heart sounds: Normal heart sounds.   Pulmonary:      Effort: Pulmonary effort is normal. No respiratory distress.      Breath sounds: Rales present. No wheezing.      Comments: Bibasilar crackles  Abdominal:      General: Bowel sounds are normal. There is no distension.      Palpations: Abdomen is soft.      Tenderness: There is no abdominal tenderness.   Musculoskeletal:         General: No tenderness. Normal range of motion.      Cervical back: Normal range of motion and neck supple.   Lymphadenopathy:      Cervical: No cervical adenopathy.   Skin:     General: Skin is warm and dry.      Capillary Refill: Capillary refill takes less than 2 seconds.      Findings: No rash.   Neurological:      Mental Status: He is alert and oriented to person, place, and time.      Cranial Nerves: No cranial nerve deficit.      Sensory: No sensory deficit.      Coordination: Coordination normal.   Psychiatric:         Behavior: Behavior normal.         Thought Content: Thought content normal.         Judgment: Judgment normal.                Significant Labs: All pertinent labs within the past 24 hours have been reviewed.  BMP:   Recent Labs   Lab 09/12/23 0053   GLU 87      K 4.3      CO2 24   BUN 51*   CREATININE 5.8*   CALCIUM 8.0*     CBC:   Recent Labs   Lab 09/12/23 0053   WBC 5.49   HGB 8.7*   HCT 27.6*          Significant Imaging: I have reviewed all pertinent imaging results/findings within the past 24  hours.  X-Ray Chest 1 View  Narrative: EXAMINATION:  XR CHEST 1 VIEW    CLINICAL HISTORY:  Shortness of breath    TECHNIQUE:  Single frontal view of the chest was performed.    COMPARISON:  08/24/2023.    FINDINGS:  Stable cardiomegaly.  Increased bilateral edema.  Increased effusion tracking in the minor fissure.    Heart and lungs otherwise appear unchanged when allowing for differences in technique and positioning.  Impression: As above.    Electronically signed by: Gavino Cifuentes MD  Date:    09/12/2023  Time:    02:19        Assessment/Plan:     * Acute on chronic respiratory failure with hypoxia  - due to volume overload from missed HD  - satting 96% on 4L NC, pt unsure of baseline O2 needs  - CXR with pulmonary edema  - nephro consulted for iHD  - ABG ordered  - repeat echo   - supplemental O2 to maintain sats >88%    Acute on chronic diastolic heart failure  - decompensated in the setting of missed HD  - BNP 3818, CXR with pulm edema  - HD for volume management   - follow up echo  - strict I/Os, daily weights     Epistaxis  - recurrent issue, bleeding controlled upon arrival to the ED  - uncontrolled HTN in the setting of eliquis use likely cause  - strict BP control as above  - prn afrin is epistaxis returns  - may benefit from ENT consult in AM if recurrent bleeding overnight    ESRD (end stage renal disease)  - MWF HD, last dialyzed Friday 9/8  - nephro consulted for iHD  - monitor lytes   - continue phos binders    Type 2 diabetes mellitus with kidney complication, without long-term current use of insulin  - diet controlled  - LDSSI, ACHS accuchecks  Lab Results   Component Value Date    HGBA1C 10.7 (H) 07/18/2023         History of pulmonary embolism  - hold AM dose of eliquis given epistaxis pending  - if H/H stable in AM and no further episode of nose bleed, can resume in AM    Hypertension  - uncontrolled in the setting of missed HD and night meds  - resume home lisinopril 40mg qhs, nifedipine 60mg  BID, imdur 30mg daily, and coreg 12.5mg BID  - dialysis in AM    COPD (chronic obstructive pulmonary disease)  - no increased cough or wheezing recent  - CXR without focal consolidation  - continue daily controlled and prn nebs    HLD (hyperlipidemia)  - continue statin, hold AM dose of ASA incase epistaxis returns     Chronic diastolic heart failure          VTE Risk Mitigation (From admission, onward)         Ordered     IP VTE HIGH RISK PATIENT  Once         09/12/23 0308     Reason for No Pharmacological VTE Prophylaxis  Once        Question Answer Comment   Reasons: Already adequately anticoagulated on oral Anticoagulants    Reasons: Active Bleeding        09/12/23 0308                     On 09/12/2023, patient should be placed in hospital observation services under my care in collaboration with Dr. Lazaro Banks.      Nelda Reveles PA-C  Department of Hospital Medicine  [facility] - Emergency Dept

## 2023-09-12 NOTE — ASSESSMENT & PLAN NOTE
- uncontrolled in the setting of missed HD and night meds  - resume home lisinopril 40mg qhs, nifedipine 60mg BID, imdur 30mg daily, and coreg 12.5mg BID  - dialysis in AM

## 2023-09-12 NOTE — ASSESSMENT & PLAN NOTE
-UF with HD for volume management   -Limit fluid intake to 1.5L/24hr period   -Further management per primary team

## 2023-09-12 NOTE — PHARMACY MED REC
"  Admission Medication History     The home medication history was taken by Westley Lewis.    You may go to "Admission" then "Reconcile Home Medications" tabs to review and/or act upon these items.     The home medication list has been updated by the Pharmacy department.   Please read ALL comments highlighted in yellow.   Please address this information as you see fit.    Feel free to contact us if you have any questions or require assistance.      The medications listed below were removed from the home medication list. Please reorder if appropriate:  Patient reports no longer taking the following medication(s):  ARTIFICIAL TEARS 0.5 % OPHT SOL    Medications listed below were obtained from: Nursing home  Current Outpatient Medications on File Prior to Encounter   Medication Sig    acetaminophen (TYLENOL) 650 MG TbSR Take 650 mg by mouth every 8 (eight) hours as needed (pain or fever over 100.4).      apixaban (ELIQUIS) 5 mg Tab Take 5 mg by mouth 2 (two) times daily.      aspirin (ECOTRIN) 81 MG EC tablet   Take 81 mg by mouth once daily.    atorvastatin (LIPITOR) 40 MG tablet Take 1 tablet (40 mg) by mouth every evening.      carvediloL (COREG) 12.5 MG tablet   Take 1 tablet (12.5 mg total) by mouth 2 (two) times daily.    cetirizine (ZYRTEC) 10 MG tablet Take 10 mg by mouth daily as needed (itching).      cloNIDine 0.3 mg/24 hr td ptwk (CATAPRES) 0.3 mg/24 hr APPLY ONE PATCH TOPICALLY EVERY WEEK FOR HIGH BLOOD PRESSURE      FLUoxetine 40 MG capsule Take 40 mg by mouth once daily.      fluticasone-salmeterol diskus inhaler 250-50 mcg Inhale 1 puff into the lungs 2 (two) times daily.      GLUCAGON EMERGENCY KIT, HUMAN, 1 mg injection 1 mg into the muscle as needed if CBG < 70      HUMALOG KWIKPEN INSULIN 100 unit/mL pen Inject 10 Units into the skin 3 (three) times daily before meals.      HYDROPHILIC CREAM TOP Apply liberal amount to affected area twice daily for dry skin      isosorbide mononitrate (IMDUR) 30 MG " 24 hr tablet Take 1 tablet (30 mg total) by mouth once daily.      LEVEMIR FLEXPEN 100 unit/mL (3 mL) InPn pen Inject 10 Units into the skin every 12 (twelve) hours.      lisinopriL (PRINIVIL,ZESTRIL) 40 MG tablet Take 1 tablet (40 mg total) by mouth every evening.      melatonin 3 mg TbDL Take 9 mg by mouth nightly as needed (sleep).      NIFEdipine (PROCARDIA-XL) 60 MG (OSM) 24 hr tablet Take 1 tablet (60 mg total) by mouth 2 (two) times a day.      nut.tx.imp.renal fxn,lac-reduc (NEPRO CARB STEADY ORAL)   Give 1 carton by mouth with each meal.    ondansetron (ZOFRAN) 8 MG tablet   Take 1 tablet by mouth 3 (three) times daily as needed for Nausea.      sevelamer carbonate (RENVELA) 800 mg Tab   Take 800 mg by mouth 3 (three) times daily.    sodium chloride (SALINE NASAL) 0.65 % nasal spray   1 spray by Nasal route as needed (dry nostril).    tiotropium bromide (SPIRIVA RESPIMAT) 2.5 mcg/actuation inhaler   Inhale 2 puffs into the lungs Daily.    umeclidinium (INCRUSE ELLIPTA) 62.5 mcg/actuation inhalation capsule   Inhale 62.5 mcg into the lungs once daily. Controller    vitamin renal formula, B-complex-vitamin c-folic acid, (NEPHROCAP) 1 mg Cap   Take 1 capsule by mouth once daily.    white petrolatum (VASELINE) ointment   Apply topically once daily. Apply small amount to both nostrils daily.      albuterol (PROVENTIL/VENTOLIN HFA) 90 mcg/actuation inhaler   Inhale 2 puffs into the lungs 4 (four) times daily as needed (breathing). Last dispensed 8/29/23, #8.5/25 day supply. Not listed on [facility] MAR      albuterol-ipratropium (DUO-NEB) 2.5 mg-0.5 mg/3 mL nebulizer solution   Take 3 mLs by nebulization every 4 (four) hours while awake. Rescue. Last dispensed 8/29/23. #90/8 day supply. Not listed on [facility] MAR      epoetin xavier (PROCRIT) 10,000 unit/mL injection   Inject 0.7 mLs (7,000 Units total) into the skin every Tues, Thurs, Sat. Not listed on [facility] MAR.     Potential issues to be addressed PRIOR TO DISCHARGE  The listed  medications were obtained from another facility ([facility]). The patient may not have been able to fill these prescriptions prior to this admission and may require new scripts upon discharge.             Westley Lewis  [id number]                .

## 2023-09-12 NOTE — ASSESSMENT & PLAN NOTE
- recurrent issue, bleeding controlled upon arrival to the ED  - uncontrolled HTN in the setting of eliquis use likely cause  - strict BP control as above  - prn afrin is epistaxis returns  - may benefit from ENT consult in AM if recurrent bleeding overnight

## 2023-09-12 NOTE — SUBJECTIVE & OBJECTIVE
Past Medical History:   Diagnosis Date    CHF (congestive heart failure)     Chronic kidney disease-mineral and bone disorder 10/12/2022    Chronic respiratory failure     COPD (chronic obstructive pulmonary disease)     Diabetes mellitus type 1     ESRD on dialysis     Hypertension     Hypervolemia 02/22/2023    Hyponatremia 03/04/2023    Other insomnia     Recurrent major depression     SOB (shortness of breath) 10/12/2022    Patient with history COPD treated with Ellipta the albuterol, fluticasone-salmeterol tachypneic in the ED saturating 94% and 6 L on nasal cannula, patient does not know how many  he uses it is in nursing home.    -duo nebs Q 4  Given that patient is a poor historian, and in the setting of left lower extremity edema and history of coagulopathy PE cannot be ruled out.  Will workup for possible infec    Unspecified lack of coordination        Past Surgical History:   Procedure Laterality Date    AV FISTULA PLACEMENT      FISTULOGRAM Left 8/20/2023    Procedure: Fistulogram;  Surgeon: Duong Aceves MD;  Location: [facility] OR [address] [place] [address];  Service: Vascular;  Laterality: Left;  1.6 MIN  55.57 mGy  10.4137 Gycm2  24 ml dye  4 ml transradial flush    PHLEBOGRAPHY  8/20/2023    Procedure: VENOGRAM, CENTRAL;  Surgeon: Duong Aceves MD;  Location: [facility] OR [address] [place] [address];  Service: Vascular;;       Review of patient's allergies indicates:  No Known Allergies  Current Facility-Administered Medications   Medication Frequency    0.9%  NaCl infusion PRN    acetaminophen tablet 650 mg Q4H PRN    atorvastatin tablet 40 mg Daily    carvediloL tablet 12.5 mg BID    dextrose 10% bolus 125 mL 125 mL PRN    dextrose 10% bolus 250 mL 250 mL PRN    epoetin xavier-epbx injection 3,500 Units Every Tues, Thurs, Sat    FLUoxetine capsule 40 mg Daily    fluticasone furoate-vilanteroL 100-25 mcg/dose diskus inhaler 1 puff Daily    glucagon (human recombinant) injection 1 mg PRN    glucose chewable tablet 16 g PRN     glucose chewable tablet 24 g PRN    insulin aspart U-100 pen 0-5 Units QID (AC + HS) PRN    isosorbide mononitrate 24 hr tablet 30 mg Daily    lisinopriL tablet 40 mg QHS    melatonin tablet 6 mg Nightly PRN    NIFEdipine 24 hr tablet 60 mg BID    ondansetron disintegrating tablet 8 mg Q8H PRN    oxymetazoline 0.05 % nasal spray 2 spray BID PRN    polyethylene glycol packet 17 g Daily PRN    promethazine tablet 25 mg Q6H PRN    sevelamer carbonate tablet 800 mg TID WM    tiotropium bromide 2.5 mcg/actuation inhaler 2 puff Daily     Current Outpatient Medications   Medication    acetaminophen (TYLENOL) 650 MG TbSR    albuterol (PROVENTIL/VENTOLIN HFA) 90 mcg/actuation inhaler    albuterol-ipratropium (DUO-NEB) 2.5 mg-0.5 mg/3 mL nebulizer solution    apixaban (ELIQUIS) 5 mg Tab    artificial tears (ISOPTO TEARS) 0.5 % ophthalmic solution    aspirin (ECOTRIN) 81 MG EC tablet    atorvastatin (LIPITOR) 40 MG tablet    carvediloL (COREG) 12.5 MG tablet    cetirizine (ZYRTEC) 10 MG tablet    cloNIDine 0.3 mg/24 hr td ptwk (CATAPRES) 0.3 mg/24 hr    epoetin xavier (PROCRIT) 10,000 unit/mL injection    FLUoxetine 40 MG capsule    fluticasone-salmeterol diskus inhaler 250-50 mcg    GLUCAGON EMERGENCY KIT, HUMAN, 1 mg injection    HYDROPHILIC CREAM TOP    isosorbide mononitrate (IMDUR) 30 MG 24 hr tablet    lisinopriL (PRINIVIL,ZESTRIL) 40 MG tablet    melatonin 3 mg TbDL    NIFEdipine (PROCARDIA-XL) 60 MG (OSM) 24 hr tablet    nut.tx.imp.renal fxn,lac-reduc (NEPRO CARB STEADY ORAL)    ondansetron (ZOFRAN) 8 MG tablet    sevelamer carbonate (RENVELA) 800 mg Tab    sodium chloride (SALINE NASAL) 0.65 % nasal spray    tiotropium bromide (SPIRIVA RESPIMAT) 2.5 mcg/actuation inhaler    umeclidinium (INCRUSE ELLIPTA) 62.5 mcg/actuation inhalation capsule    vitamin renal formula, B-complex-vitamin c-folic acid, (NEPHROCAP) 1 mg Cap    white petrolatum (VASELINE) ointment     Family History       Problem Relation (Age of Onset)     Diabetes Mother          Tobacco Use    Smoking status: Never    Smokeless tobacco: Never   Substance and Sexual Activity    Alcohol use: Not Currently    Drug use: Not on file    Sexual activity: Not Currently     Review of Systems   Constitutional: Negative.    HENT: Negative.     Eyes: Negative.    Respiratory:  Positive for shortness of breath.    Cardiovascular: Negative.    Gastrointestinal: Negative.    Genitourinary:         Anuric at baseline    Musculoskeletal: Negative.    Skin: Negative.    Neurological: Negative.    Psychiatric/Behavioral: Negative.       Objective:     Vital Signs (Most Recent):  Temp: 98 °F (36.7 °C) (09/12/23 0758)  Pulse: 65 (09/12/23 1000)  Resp: 14 (09/12/23 0758)  BP: (!) 148/77 (09/12/23 1000)  SpO2: 95 % (09/12/23 0758) Vital Signs (24h Range):  Temp:  [98 °F (36.7 °C)-98.9 °F (37.2 °C)] 98 °F (36.7 °C)  Pulse:  [63-71] 65  Resp:  [14-22] 14  SpO2:  [92 %-100 %] 95 %  BP: (125-234)/() 148/77     Weight: 68.5 kg (151 lb) (09/12/23 0758)  Body mass index is 22.3 kg/m².  Body surface area is 1.83 meters squared.    No intake/output data recorded.     Physical Exam  Constitutional:       Appearance: Normal appearance.   HENT:      Head: Normocephalic and atraumatic.      Nose: Nose normal.      Mouth/Throat:      Mouth: Mucous membranes are moist.      Pharynx: Oropharynx is clear.   Eyes:      Conjunctiva/sclera: Conjunctivae normal.   Cardiovascular:      Rate and Rhythm: Normal rate.      Comments: LUE AVF with +thrill   Pulmonary:      Effort: Pulmonary effort is normal.      Breath sounds: Rales present.   Abdominal:      General: Abdomen is flat.      Palpations: Abdomen is soft.   Musculoskeletal:         General: Normal range of motion.      Cervical back: Normal range of motion and neck supple.   Skin:     General: Skin is warm and dry.   Neurological:      General: No focal deficit present.      Mental Status: He is alert and oriented to person, place, and time.    Psychiatric:         Mood and Affect: Mood normal.         Behavior: Behavior normal.          Significant Labs:  CBC:   Recent Labs   Lab 09/12/23  0320   WBC 5.13   RBC 2.77*   HGB 8.2*   HCT 25.7*      MCV 93   MCH 29.6   MCHC 31.9*     CMP:   Recent Labs   Lab 09/12/23  0053 09/12/23 0320   GLU 87 103   CALCIUM 8.0* 7.8*   ALBUMIN 2.9*  --    PROT 6.2  --     136   K 4.3 4.6   CO2 24 20*    102   BUN 51* 47*   CREATININE 5.8* 5.9*   ALKPHOS 157*  --    ALT 20  --    AST 35  --    BILITOT 0.6  --      All labs within the past 24 hours have been reviewed.

## 2023-09-12 NOTE — ASSESSMENT & PLAN NOTE
- hold AM dose of eliquis given epistaxis pending  - if H/H stable in AM and no further episode of nose bleed, can resume in AM

## 2023-09-12 NOTE — CONSULTS
[facility] - Emergency Dept  Nephrology  Consult Note    Patient Name: Cosme Lewis  MRN: [id number]  Admission Date: 9/11/2023  Hospital Length of Stay: 0 days  Attending Provider: Nicole Redmond MD   Primary Care Physician: Eliecer Ohara III, MD  Principal Problem:Acute on chronic respiratory failure with hypoxia    Inpatient consult to Nephrology  Consult performed by: Abdulaziz Forbes NP  Consult ordered by: Lance Razo Jr., MD  Reason for consult: ESRD/HD         Subjective:     HPI: Cosme Lewis is a 58 y.o. male with a PMHx of CHF, COPD, chronic respiratory failure, ESRD on HD MWF, Hx PE on eliquis who [place] [facility] for evaluation of epistaxis. Patient is a poor historian. Bleeding controlled on arrival to the ED and no further episodes since admit. He also complains of shortness of breath since he missed his HD session on Monday due to the epistaxis. Reports shortness of breath worsens with minimal exertion and with lying flat. Anuric at baseline. Denies fever, chills, cough, wheezing, chest pain N/V, abdominal pain, HA, vision changes or syncope.     ED: hypertensive to 234/109, satting 94% on 6L NC>> weaned to 4L NC. No leukocytosis or electrolyte abnormalities. CMP consistent with ESRD. BNP 3818, trop 0.043. EKG with nonspecific ST changes in lateral leads. CXR shows pulmonary edema. Nephrology was consulted for management of ESRD/HD.       Past Medical History:   Diagnosis Date    CHF (congestive heart failure)     Chronic kidney disease-mineral and bone disorder 10/12/2022    Chronic respiratory failure     COPD (chronic obstructive pulmonary disease)     Diabetes mellitus type 1     ESRD on dialysis     Hypertension     Hypervolemia 02/22/2023    Hyponatremia 03/04/2023    Other insomnia     Recurrent major depression     SOB (shortness of breath) 10/12/2022    Patient with history COPD treated with Ellipta the albuterol, fluticasone-salmeterol tachypneic in the ED  saturating 94% and 6 L on nasal cannula, patient does not know how many  he uses it is in nursing home.    -duo nebs Q 4  Given that patient is a poor historian, and in the setting of left lower extremity edema and history of coagulopathy PE cannot be ruled out.  Will workup for possible infec    Unspecified lack of coordination        Past Surgical History:   Procedure Laterality Date    AV FISTULA PLACEMENT      FISTULOGRAM Left 8/20/2023    Procedure: Fistulogram;  Surgeon: Duong Aceves MD;  Location: [facility] OR [address] [place] [address];  Service: Vascular;  Laterality: Left;  1.6 MIN  55.57 mGy  10.4137 Gycm2  24 ml dye  4 ml transradial flush    PHLEBOGRAPHY  8/20/2023    Procedure: VENOGRAM, CENTRAL;  Surgeon: Duong Aceves MD;  Location: [facility] OR [address] [place] [address];  Service: Vascular;;       Review of patient's allergies indicates:  No Known Allergies  Current Facility-Administered Medications   Medication Frequency    0.9%  NaCl infusion PRN    acetaminophen tablet 650 mg Q4H PRN    atorvastatin tablet 40 mg Daily    carvediloL tablet 12.5 mg BID    dextrose 10% bolus 125 mL 125 mL PRN    dextrose 10% bolus 250 mL 250 mL PRN    epoetin xavier-epbx injection 3,500 Units Every Tues, Thurs, Sat    FLUoxetine capsule 40 mg Daily    fluticasone furoate-vilanteroL 100-25 mcg/dose diskus inhaler 1 puff Daily    glucagon (human recombinant) injection 1 mg PRN    glucose chewable tablet 16 g PRN    glucose chewable tablet 24 g PRN    insulin aspart U-100 pen 0-5 Units QID (AC + HS) PRN    isosorbide mononitrate 24 hr tablet 30 mg Daily    lisinopriL tablet 40 mg QHS    melatonin tablet 6 mg Nightly PRN    NIFEdipine 24 hr tablet 60 mg BID    ondansetron disintegrating tablet 8 mg Q8H PRN    oxymetazoline 0.05 % nasal spray 2 spray BID PRN    polyethylene glycol packet 17 g Daily PRN    promethazine tablet 25 mg Q6H PRN    sevelamer carbonate tablet 800 mg TID WM    tiotropium bromide 2.5 mcg/actuation  inhaler 2 puff Daily     Current Outpatient Medications   Medication    acetaminophen (TYLENOL) 650 MG TbSR    albuterol (PROVENTIL/VENTOLIN HFA) 90 mcg/actuation inhaler    albuterol-ipratropium (DUO-NEB) 2.5 mg-0.5 mg/3 mL nebulizer solution    apixaban (ELIQUIS) 5 mg Tab    artificial tears (ISOPTO TEARS) 0.5 % ophthalmic solution    aspirin (ECOTRIN) 81 MG EC tablet    atorvastatin (LIPITOR) 40 MG tablet    carvediloL (COREG) 12.5 MG tablet    cetirizine (ZYRTEC) 10 MG tablet    cloNIDine 0.3 mg/24 hr td ptwk (CATAPRES) 0.3 mg/24 hr    epoetin xavier (PROCRIT) 10,000 unit/mL injection    FLUoxetine 40 MG capsule    fluticasone-salmeterol diskus inhaler 250-50 mcg    GLUCAGON EMERGENCY KIT, HUMAN, 1 mg injection    HYDROPHILIC CREAM TOP    isosorbide mononitrate (IMDUR) 30 MG 24 hr tablet    lisinopriL (PRINIVIL,ZESTRIL) 40 MG tablet    melatonin 3 mg TbDL    NIFEdipine (PROCARDIA-XL) 60 MG (OSM) 24 hr tablet    nut.tx.imp.renal fxn,lac-reduc (NEPRO CARB STEADY ORAL)    ondansetron (ZOFRAN) 8 MG tablet    sevelamer carbonate (RENVELA) 800 mg Tab    sodium chloride (SALINE NASAL) 0.65 % nasal spray    tiotropium bromide (SPIRIVA RESPIMAT) 2.5 mcg/actuation inhaler    umeclidinium (INCRUSE ELLIPTA) 62.5 mcg/actuation inhalation capsule    vitamin renal formula, B-complex-vitamin c-folic acid, (NEPHROCAP) 1 mg Cap    white petrolatum (VASELINE) ointment     Family History       Problem Relation (Age of Onset)    Diabetes Mother          Tobacco Use    Smoking status: Never    Smokeless tobacco: Never   Substance and Sexual Activity    Alcohol use: Not Currently    Drug use: Not on file    Sexual activity: Not Currently     Review of Systems   Constitutional: Negative.    HENT: Negative.     Eyes: Negative.    Respiratory:  Positive for shortness of breath.    Cardiovascular: Negative.    Gastrointestinal: Negative.    Genitourinary:         Anuric at baseline    Musculoskeletal:  Negative.    Skin: Negative.    Neurological: Negative.    Psychiatric/Behavioral: Negative.       Objective:     Vital Signs (Most Recent):  Temp: 98 °F (36.7 °C) (09/12/23 0758)  Pulse: 65 (09/12/23 1000)  Resp: 14 (09/12/23 0758)  BP: (!) 148/77 (09/12/23 1000)  SpO2: 95 % (09/12/23 0758) Vital Signs (24h Range):  Temp:  [98 °F (36.7 °C)-98.9 °F (37.2 °C)] 98 °F (36.7 °C)  Pulse:  [63-71] 65  Resp:  [14-22] 14  SpO2:  [92 %-100 %] 95 %  BP: (125-234)/() 148/77     Weight: 68.5 kg (151 lb) (09/12/23 0758)  Body mass index is 22.3 kg/m².  Body surface area is 1.83 meters squared.    No intake/output data recorded.     Physical Exam  Constitutional:       Appearance: Normal appearance.   HENT:      Head: Normocephalic and atraumatic.      Nose: Nose normal.      Mouth/Throat:      Mouth: Mucous membranes are moist.      Pharynx: Oropharynx is clear.   Eyes:      Conjunctiva/sclera: Conjunctivae normal.   Cardiovascular:      Rate and Rhythm: Normal rate.      Comments: LUE AVF with +thrill   Pulmonary:      Effort: Pulmonary effort is normal.      Breath sounds: Rales present.   Abdominal:      General: Abdomen is flat.      Palpations: Abdomen is soft.   Musculoskeletal:         General: Normal range of motion.      Cervical back: Normal range of motion and neck supple.   Skin:     General: Skin is warm and dry.   Neurological:      General: No focal deficit present.      Mental Status: He is alert and oriented to person, place, and time.   Psychiatric:         Mood and Affect: Mood normal.         Behavior: Behavior normal.          Significant Labs:  CBC:   Recent Labs   Lab 09/12/23  0320   WBC 5.13   RBC 2.77*   HGB 8.2*   HCT 25.7*      MCV 93   MCH 29.6   MCHC 31.9*     CMP:   Recent Labs   Lab 09/12/23  0053 09/12/23  0320   GLU 87 103   CALCIUM 8.0* 7.8*   ALBUMIN 2.9*  --    PROT 6.2  --     136   K 4.3 4.6   CO2 24 20*    102   BUN 51* 47*   CREATININE 5.8* 5.9*   ALKPHOS 157*  --     ALT 20  --    AST 35  --    BILITOT 0.6  --      All labs within the past 24 hours have been reviewed.        Assessment/Plan:     Pulmonary  * Acute on chronic respiratory failure with hypoxia  -UF with HD for volume management   -Limit fluid intake to 1.5L/24hr period   -Further management per primary team     Renal/  ESRD (end stage renal disease)  Outpatient HD Information:     -Outpatient HD unit: [facility]   -HD tx days: MWF   -HD tx time: 4hrs   -Last HD tx prior to presentation: 9/8/23  -HD access: LUE AVF   -HD modality: iHD   -Residual urine: Anuric         Plan/Recommendations:     -HD today for metabolic clearance and volume management   -Renal diet  -Strict I/O's and daily weights  -Daily renal function panels   -Renally dose meds        Chronic kidney disease-mineral and bone disorder  -Renal diet   -Continue renvela        Anemia in ESRD (end-stage renal disease)  -Target Hg of 10-12  -Transfuse for Hg <7.0  -Epo with HD         Thank you for your consult. I will follow-up with patient. Please contact us if you have any additional questions.    Abdulaziz Forbes NP  Nephrology  [facility] - Emergency Dept

## 2023-09-12 NOTE — ED PROVIDER NOTES
Encounter Date: 9/11/2023       History     Chief Complaint   Patient presents with    Epistaxis     Pt arrives via EMS from [place] stating his nose has been bleeding since this morning. Bleeding controlled on arrival.     Pt is a 58 year old male with hx of ESRD on dialysis MWF, COPD, CHF, and PE on eliquis who presents for evaluation of nosebleed, which began today. He denies any known trigger such as trauma. Bleeding has resolved since presenting to the ED. Pt also complain of worsening shortness of breath. States he missed dialysis today 2/2 nosebleed. Denies fever, chills, cough, chest pain, shortness of breath, nausea, or vomiting. Denies increased use of home inhalers     The history is provided by the patient.     Review of patient's allergies indicates:  No Known Allergies  Past Medical History:   Diagnosis Date    CHF (congestive heart failure)     Chronic kidney disease-mineral and bone disorder 10/12/2022    Chronic respiratory failure     COPD (chronic obstructive pulmonary disease)     Diabetes mellitus type 1     ESRD on dialysis     Hypertension     Hypervolemia 02/22/2023    Hyponatremia 03/04/2023    Other insomnia     Recurrent major depression     SOB (shortness of breath) 10/12/2022    Patient with history COPD treated with Ellipta the albuterol, fluticasone-salmeterol tachypneic in the ED saturating 94% and 6 L on nasal cannula, patient does not know how many  he uses it is in nursing home.    -duo nebs Q 4  Given that patient is a poor historian, and in the setting of left lower extremity edema and history of coagulopathy PE cannot be ruled out.  Will workup for possible infec    Unspecified lack of coordination      Past Surgical History:   Procedure Laterality Date    AV FISTULA PLACEMENT      FISTULOGRAM Left 8/20/2023    Procedure: Fistulogram;  Surgeon: Duong Aceves MD;  Location: [facility] OR [address] [place] [address];  Service: Vascular;  Laterality: Left;  1.6 MIN  55.57 mGy  10.4137 Gycm2  24  ml dye  4 ml transradial flush    PHLEBOGRAPHY  8/20/2023    Procedure: VENOGRAM, CENTRAL;  Surgeon: Duong Aceves MD;  Location: [facility] OR [address] [place] [address];  Service: Vascular;;     Family History   Problem Relation Age of Onset    Diabetes Mother      Social History     Tobacco Use    Smoking status: Never    Smokeless tobacco: Never   Substance Use Topics    Alcohol use: Not Currently     Review of Systems    Physical Exam     Initial Vitals [09/11/23 2311]   BP Pulse Resp Temp SpO2   (!) 146/82 64 20 98.9 °F (37.2 °C) 100 %      MAP       --         Physical Exam    Nursing note and vitals reviewed.  Constitutional: No distress.   Chronically ill appearing    HENT:   Head: Normocephalic and atraumatic.   Nose: No sinus tenderness, nasal deformity, septal deviation or nasal septal hematoma. Epistaxis (minimal, no active bleed) is observed.   Eyes: EOM are normal. Pupils are equal, round, and reactive to light.   Neck: Neck supple. No tracheal deviation present.   Cardiovascular:  Normal rate, regular rhythm and intact distal pulses.           No murmur heard.  Pulmonary/Chest: Breath sounds normal. No stridor. No respiratory distress. He has no wheezes. He has no rales.   Abdominal: Abdomen is soft. He exhibits no distension. There is no abdominal tenderness.   Musculoskeletal:         General: No edema. Normal range of motion.      Cervical back: Neck supple.     Neurological: He is alert and oriented to person, place, and time.   Skin: Skin is warm and dry. Capillary refill takes less than 2 seconds.         ED Course   Procedures  Labs Reviewed   CBC W/ AUTO DIFFERENTIAL - Abnormal; Notable for the following components:       Result Value    RBC 2.94 (*)     Hemoglobin 8.7 (*)     Hematocrit 27.6 (*)     MCHC 31.5 (*)     RDW 15.0 (*)     Lymph # 0.6 (*)     Gran % 78.6 (*)     Lymph % 10.6 (*)     All other components within normal limits   COMPREHENSIVE METABOLIC PANEL - Abnormal; Notable for the following  components:    BUN 51 (*)     Creatinine 5.8 (*)     Calcium 8.0 (*)     Albumin 2.9 (*)     Alkaline Phosphatase 157 (*)     eGFR 10.5 (*)     All other components within normal limits   B-TYPE NATRIURETIC PEPTIDE - Abnormal; Notable for the following components:    BNP 3,818 (*)     All other components within normal limits   TROPONIN I - Abnormal; Notable for the following components:    Troponin I 0.043 (*)     All other components within normal limits   BASIC METABOLIC PANEL - Abnormal; Notable for the following components:    CO2 20 (*)     BUN 47 (*)     Creatinine 5.9 (*)     Calcium 7.8 (*)     eGFR 10.2 (*)     All other components within normal limits   CBC W/ AUTO DIFFERENTIAL - Abnormal; Notable for the following components:    RBC 2.77 (*)     Hemoglobin 8.2 (*)     Hematocrit 25.7 (*)     MCHC 31.9 (*)     RDW 15.4 (*)     Lymph # 0.6 (*)     Gran % 77.9 (*)     Lymph % 11.9 (*)     All other components within normal limits   TROPONIN I - Abnormal; Notable for the following components:    Troponin I 0.041 (*)     All other components within normal limits   ISTAT PROCEDURE - Abnormal; Notable for the following components:    POC PO2 67 (*)     POC SATURATED O2 94 (*)     POC TCO2 28 (*)     All other components within normal limits   BETA - HYDROXYBUTYRATE, SERUM   MAGNESIUM   PHOSPHORUS   POCT GLUCOSE, HAND-HELD DEVICE   POCT GLUCOSE   POCT GLUCOSE MONITORING CONTINUOUS          Imaging Results              X-Ray Chest 1 View (Final result)  Result time 09/12/23 02:19:50      Final result by Gavino Cifuentes MD (09/12/23 02:19:50)                   Impression:      As above.      Electronically signed by: Gavino Cifuentes MD  Date:    09/12/2023  Time:    02:19               Narrative:    EXAMINATION:  XR CHEST 1 VIEW    CLINICAL HISTORY:  Shortness of breath    TECHNIQUE:  Single frontal view of the chest was performed.    COMPARISON:  08/24/2023.    FINDINGS:  Stable cardiomegaly.  Increased  bilateral edema.  Increased effusion tracking in the minor fissure.    Heart and lungs otherwise appear unchanged when allowing for differences in technique and positioning.                                       Medications   atorvastatin tablet 40 mg (has no administration in time range)   fluticasone furoate-vilanteroL 100-25 mcg/dose diskus inhaler 1 puff (has no administration in time range)   FLUoxetine capsule 40 mg (has no administration in time range)   isosorbide mononitrate 24 hr tablet 30 mg (has no administration in time range)   sevelamer carbonate tablet 800 mg (has no administration in time range)   tiotropium bromide 2.5 mcg/actuation inhaler 2 puff (has no administration in time range)   NIFEdipine 24 hr tablet 60 mg (has no administration in time range)   carvediloL tablet 12.5 mg (12.5 mg Oral Given 9/12/23 0314)   lisinopriL tablet 40 mg (40 mg Oral Given 9/12/23 0314)   oxymetazoline 0.05 % nasal spray 2 spray (2 sprays Each Nostril Given 9/12/23 0314)   melatonin tablet 6 mg (has no administration in time range)   ondansetron disintegrating tablet 8 mg (has no administration in time range)   promethazine tablet 25 mg (has no administration in time range)   polyethylene glycol packet 17 g (has no administration in time range)   acetaminophen tablet 650 mg (has no administration in time range)   glucose chewable tablet 16 g (has no administration in time range)   glucose chewable tablet 24 g (has no administration in time range)   glucagon (human recombinant) injection 1 mg (has no administration in time range)   insulin aspart U-100 pen 0-5 Units (has no administration in time range)   dextrose 10% bolus 125 mL 125 mL (has no administration in time range)   dextrose 10% bolus 250 mL 250 mL (has no administration in time range)     Medical Decision Making  Pt presents for epistaxis which has resolved as well as shortness of breath in the setting of missed dialysis. Ddx: ACS, arrhythmia,  electrolyte abnormality, CHF, COPD, DKA, pneumonia, viral illness. Labs without evidence of leukocytosis, significant anemia, or significant electrolyte abnormality. No evidence of COPD exacerbation on physical exam. No evidence of DKA based on lab work. EKG without ST elevation. BNP elevated at 3800. CXR concerning for pulmonary edema. Nephrology consulted who will arrange dialysis. Pt admitted to hospital medicine for further management and evaluation     Amount and/or Complexity of Data Reviewed  Labs: ordered.  Radiology: ordered.    Risk  Decision regarding hospitalization.                               Clinical Impression:   Final diagnoses:  [R06.02] Shortness of breath  [E87.70] Volume overload        ED Disposition Condition    Observation                 Lance Razo Jr., MD  Resident  09/12/23 5456

## 2023-09-12 NOTE — HPI
Cosme Lewis is a 58 y.o. male with a PMHx of CHF, COPD, chronic respiratory failure, ESRD, Hx PE on eliquis who presents to [facility] from [facility] for evaluation of epistaxis. Patient is a poor historian. He reports persistent nose bleeding from both nares since this morning. Bleeding controlled on arrival to the ED and no further episodes since admit. He also complains of shortness of breath since he missed his HD session due to the epistaxis. Denies any nasal trauma recently. Reports shortness of breath worsens with minimal exertion and with lying flat. He also complains of left hamstring pain which has been going on for quite some time without recent worsening. He is unsure of his baseline O2 needs. Denies fever, chills, cough, wheezing, chest pain N/V, abdominal pain, HA, vision changes or syncope.    ED: hypertensive to 234/109, satting 94% on 6L NC>> weaned to 4L NC. No leukocytosis or electrolyte abnormalities. CMP consistent with ESRD. BNP 3818, trop 0.043. EKG with nonspecific ST changes in lateral leads. CXR shows pulmonary edema.

## 2023-09-12 NOTE — PROGRESS NOTES
HD tx complete. 3 L removed over 3.5 hours, tolerated well. Blood returned, 15 gauge needles removed x2. Gauze and tape applied,pressure held to each site for 10 minutes, hemostasis achieved. Report given to primary nurse Niya. Transferred from unit via stretcher back to [facility]

## 2023-09-12 NOTE — ASSESSMENT & PLAN NOTE
- no increased cough or wheezing recent  - CXR without focal consolidation  - continue daily controlled and prn nebs

## 2023-09-12 NOTE — ASSESSMENT & PLAN NOTE
- diet controlled  - LDSSI, ACHS accuchecks  Lab Results   Component Value Date    HGBA1C 10.7 (H) 07/18/2023

## 2023-09-12 NOTE — ASSESSMENT & PLAN NOTE
- MWF HD, last dialyzed Friday 9/8  - nephro consulted for iHD  - monitor lytes   - continue phos binders

## 2023-09-12 NOTE — PLAN OF CARE
[facility] - Emergency Dept  Initial Discharge Assessment       Primary Care Provider: Eliecer Ohara III, MD    Admission Diagnosis: Volume overload [E87.70]  CHF (congestive heart failure) [I50.9]    Admission Date: 9/11/2023  Expected Discharge Date:     Pt is a resident of [facility].      Pt to return to Nursing Home on d/c     Transition of Care Barriers: (P) None    Payor: MEDICARE / Plan: MEDICARE PART A & B / Product Type: Government /     Extended Emergency Contact Information  Primary Emergency Contact: Kassandra Leon  Mobile Phone: [phone]  Relation: Mother    Discharge Plan A: (P) Return to nursing home  Discharge Plan B: (P) Return to Nursing Home      [facility]  [address]  [facility] [address]  Phone: [phone] Fax: [phone]      Initial Assessment (most recent)       Adult Discharge Assessment - 09/12/23 1216          Discharge Assessment    Assessment Type Discharge Planning Assessment (P)      Confirmed/corrected address, phone number and insurance Yes (P)      Confirmed Demographics Correct on Facesheet (P)      Source of Information patient (P)      Reason For Admission Acute on chronic respiratory failure with hypoxia (P)      Facility Arrived From: [facility] (P)      Do you expect to return to your current living situation? Yes (P)      Do you have help at home or someone to help you manage your care at home? Yes (P)      Who are your caregiver(s) and their phone number(s)? facility staff (P)      Prior to hospitilization cognitive status: Alert/Oriented;No Deficits (P)      Current cognitive status: Alert/Oriented;No Deficits (P)      Walking or Climbing Stairs ambulation difficulty, requires equipment (P)      Mobility Management wheelchair (P)      Home Accessibility wheelchair accessible (P)      Home Layout Able to live on 1st floor (P)      Equipment Currently Used at Home wheelchair (P)      Patient currently being followed by  outpatient case management? No (P)      Do you currently have service(s) that help you manage your care at home? Yes (P)      How Many hours does patient receive services 24 (P)      Name and Contact number of agency facility staff (P)      Is the pt/caregiver preference to resume services with current agency Yes (P)      Who is going to help you get home at discharge? facility staff (P)      How do you get to doctors appointments? other (see comments) (P)    facility staff    Are you on dialysis? Yes (P)      Dialysis Name and Scheduled days cannot recall name of dialysis clinic - M, W, Fri at 1200hrs (P)      Do you take coumadin? No (P)      DME Needed Upon Discharge  none (P)      Discharge Plan discussed with: Patient (P)      Transition of Care Barriers None (P)      Discharge Plan A Return to nursing home (P)      Discharge Plan B Return to Nursing Home (P)                         Dejah Mendoza CD, MSW, LMSW, RSW   Case Management  [facility]  Email: [email or web address]

## 2023-09-13 NOTE — NURSING
Nurses Note -- 4 Eyes      9/13/2023   7:11 AM      Skin assessed during: Admit      [x] No Altered Skin Integrity Present    Prevention Measures Documented      [] Yes- Altered Skin Integrity Present or Discovered   [] LDA Added if Not in Epic (Describe Wound)   [] New Altered Skin Integrity was Present on Admit and Documented in LDA   [] Wound Image Taken    Wound Care Consulted? Yes    Attending Nurse:  Jose F Lagunas RN/Staff Member:   Annie

## 2023-09-13 NOTE — PLAN OF CARE
APPOINTMENT:    Patient Appointment(s) scheduled with  Denis Alberts PA-C, on Wednesday Sep 20, 2023 8:00 AM

## 2023-09-13 NOTE — NURSING
Report received from CAMILA Mccoy LPN.  Patient arrived to [facility] via stretcher. Alert and awake.    HD tx initiated via Left upper arm AV fistula with 15 gauge needles.

## 2023-09-13 NOTE — PROGRESS NOTES
[facility]    Cosme Lewis is a 58 y.o. male currently on hemodialysis for removal of uremic toxins and volume.     Patient seen and evaluated on hemodialysis, tolerating treatment, see HD flowsheet for vitals and assessments.    No Hypotension, chest pain, shortness of breath, cramping, nausea or vomiting.      Labs have been reviewed and the dialysate bath has been adjusted.     Labs:     Recent Labs   Lab 09/12/23 0053 09/12/23 0320 09/13/23  0450    136 135*   K 4.3 4.6 3.9    102 99   CO2 24 20* 22*   BUN 51* 47* 35*   CREATININE 5.8* 5.9* 4.2*   CALCIUM 8.0* 7.8* 7.4*   PHOS  --  4.0 3.2     Recent Labs   Lab 09/12/23 0053 09/12/23 0320 09/13/23  0450   WBC 5.49 5.13 3.76*   HGB 8.7* 8.2* 7.4*   HCT 27.6* 25.7* 23.0*    179 138*     Lab Results   Component Value Date    FESATURATED 19 (L) 08/22/2023    FERRITIN 1,455 (H) 08/22/2023        Assessment/Plan      Ultrafiltration goal: Liters: 2L. Duration:  3.5 hrs    Monday/Wednesday/Friday    - Seen on dialysis today, tolerating session with current UFR, no complications.    - No lab stick/BP intake on access site  - Continue to monitor intake and output, daily weights   - Please avoid gadolinium, fleets, phos-based laxatives, NSAIDs  - Will follow closely and continue dialysis treatments while in-patient    Anemia  - Hgb 7.4. Plans for dc today.   - EPO can be administered and dosed per his OP unit upon discharge.    BMM  - Renal diet with protein intake goal 1.5 g/kg/d if appropriate   - Novasource with meals   - F/U PO4, Mg, Calcium. And albumin levels.   - Phos 3.2. Please continue Sevelamer.     HTN  - BP Normal  - Goal for BP <140mmHg SBP and <90 mmHg DBP. Maintain MAP > 65 mmHg.  - Continue home antihypertensive regimen; adjust as needed.       Rosi Grant, KUMAR, APRN, FNP-C  Nephrology Department  Pager:  [phone]

## 2023-09-13 NOTE — NURSING
Report received from ADELITA Estrella. Pt arrived on unit via stretcher on 3L O2. Pt oriented to room and unit. Pt given food and water. Call light within reach will continue to monitor.

## 2023-09-13 NOTE — PLAN OF CARE
Per note, patient is a resident of [facility].  SW sent updated clinicals for review.       09/13/23 1118   Post-Acute Status   Post-Acute Authorization Placement   Post-Acute Placement Status Referrals Sent   Discharge Delays None known at this time   Discharge Plan   Discharge Plan A Return to nursing home   Discharge Plan B Return to Nursing Home     Nona Mcmahan LMSW  PRN-  [facility]  Ext. [phone]

## 2023-09-13 NOTE — PLAN OF CARE
[facility]     Department of Hospital Medicine     [address] [place] [address]     [phone] after hours  [phone] fax       NURSING HOME ORDERS                                     09/13/2023    Admit to Nursing Home:  Nursing home    Diagnoses:  Active Hospital Problems    Diagnosis  POA    *Acute on chronic respiratory failure with hypoxia [J96.21]  Yes    Type 2 diabetes mellitus with kidney complication, without long-term current use of insulin [E11.29]  Yes    History of pulmonary embolism [Z86.711]  Yes    Acute on chronic diastolic heart failure [I50.33]  Yes    Hypertension [I10]  Yes    Epistaxis [R04.0]  Yes    COPD (chronic obstructive pulmonary disease) [J44.9]  Yes    HLD (hyperlipidemia) [E78.5]  Yes     Chronic    ESRD (end stage renal disease) [N18.6]  Yes      Resolved Hospital Problems   No resolved problems to display.       Allergies:Review of patient's allergies indicates:  No Known Allergies    Vitals:  per unit routine    Diet: renal diet double portions    Fluid restriction 1000 mL per day    Acitivities:    - Up in a chair each morning as tolerated   - May use walker, cane, or self-propelled wheelchair    Nursing Precautions:    - Aspiration precautions:             -  Upright 90 degrees before during and after meals    - Decubitus precautions:        -  for positioning   - Pressure reducing foam mattress   - Turn patient every two hours. Use wedge pillows to anchor patient   - Fall precautions    Oxygen 2L nasal cannula with HUMIDIFIED OXYGEN.    LABS:  per unit routine      DIABETES CARE:      Check blood sugar:      Fingerstick blood sugar AC and HS      Report CBG < 60 or > 400 to physician.                                          Insulin Sliding Scale          Glucose  Novolog Insulin Subcutaneous        0 - 60   Orange juice or glucose tablet, hold insulin      No insulin   201-250  2 units   251-300  4  units   301-350  6 units   351-400  8 units   >400   10 units then call physician      Medications: Discontinue all previous medication orders, if any. See new list below.  Current Discharge Medication List        START taking these medications    Details   diclofenac sodium (VOLTAREN) 1 % Gel Apply 2 g topically 4 (four) times daily as needed (pain).      LIDOcaine 4 % PtMd Apply 2 patches topically once daily. Apply to area of greatest pain      mineral oil-hydrophil petrolat (AQUAPHOR) Oint Apply topically 2 (two) times a day. Bilateral hands  Qty: 454 g, Refills: 0      oxymetazoline (AFRIN, OXYMETAZOLINE,) 0.05 % nasal spray 3 sprays by Nasal route every 30 (thirty) minutes as needed (nose bleed). 3 sprays to nostril with bleed. Pinch afterward for 15 minutes.  Refills: 0           CONTINUE these medications which have CHANGED    Details   acetaminophen (TYLENOL) 650 MG TbSR Take 1 tablet (650 mg total) by mouth every 6 (six) hours as needed (pain or fever over 100.4).  Refills: 0      atorvastatin (LIPITOR) 40 MG tablet Take 1 tablet (40 mg total) by mouth every evening.  Qty: 90 tablet, Refills: 3      LEVEMIR FLEXPEN 100 unit/mL (3 mL) InPn pen Inject 20 Units into the skin every evening.  Refills: 0      sevelamer carbonate (RENVELA) 800 mg Tab Take 1 tablet (800 mg total) by mouth 3 (three) times daily with meals.      sodium chloride (SALINE NASAL) 0.65 % nasal spray 2 sprays by Nasal route 3 (three) times daily.  Qty: 104 mL, Refills: 12      white petrolatum (VASELINE) ointment Apply topically 2 (two) times a day. Apply small amount to both nostrils.  Qty: 5 g, Refills: 0           CONTINUE these medications which have NOT CHANGED    Details   cloNIDine 0.3 mg/24 hr td ptwk (CATAPRES) 0.3 mg/24 hr APPLY ONE PATCH TOPICALLY EVERY WEEK FOR HIGH BLOOD PRESSURE      albuterol (PROVENTIL/VENTOLIN HFA) 90 mcg/actuation inhaler Inhale 2 puffs into the lungs 4 (four) times daily as needed (breathing).       apixaban (ELIQUIS) 5 mg Tab Take 5 mg by mouth 2 (two) times daily.      aspirin (ECOTRIN) 81 MG EC tablet Take 81 mg by mouth once daily.      carvediloL (COREG) 12.5 MG tablet Take 1 tablet (12.5 mg total) by mouth 2 (two) times daily.  Qty: 60 tablet, Refills: 11    Comments: .      cetirizine (ZYRTEC) 10 MG tablet Take 10 mg by mouth daily as needed (itching).      FLUoxetine 40 MG capsule Take 40 mg by mouth once daily.      fluticasone-salmeterol diskus inhaler 250-50 mcg Inhale 1 puff into the lungs 2 (two) times daily.      isosorbide mononitrate (IMDUR) 30 MG 24 hr tablet Take 1 tablet (30 mg total) by mouth once daily.  Qty: 90 tablet, Refills: 3      lisinopriL (PRINIVIL,ZESTRIL) 40 MG tablet Take 1 tablet (40 mg total) by mouth every evening.  Qty: 90 tablet, Refills: 3    Comments: .      melatonin 3 mg TbDL Take 9 mg by mouth nightly as needed (sleep).      NIFEdipine (PROCARDIA-XL) 60 MG (OSM) 24 hr tablet Take 1 tablet (60 mg total) by mouth 2 (two) times a day.  Qty: 60 tablet, Refills: 11    Comments: .      nut.tx.imp.renal fxn,lac-reduc (NEPRO CARB STEADY ORAL) Give 1 carton by mouth with each meal.      ondansetron (ZOFRAN) 8 MG tablet Take 1 tablet by mouth 3 (three) times daily as needed for Nausea.      tiotropium bromide (SPIRIVA RESPIMAT) 2.5 mcg/actuation inhaler Inhale 2 puffs into the lungs Daily.      vitamin renal formula, B-complex-vitamin c-folic acid, (NEPHROCAP) 1 mg Cap Take 1 capsule by mouth once daily.           STOP taking these medications       albuterol-ipratropium (DUO-NEB) 2.5 mg-0.5 mg/3 mL nebulizer solution Comments:   Reason for Stopping:         epoetin xavier (PROCRIT) 10,000 unit/mL injection Comments:   Reason for Stopping:         GLUCAGON EMERGENCY KIT, HUMAN, 1 mg injection Comments:   Reason for Stopping:         HUMALOG KWIKPEN INSULIN 100 unit/mL pen Comments:   Reason for Stopping:         HYDROPHILIC CREAM TOP Comments:   Reason for Stopping:          umeclidinium (INCRUSE ELLIPTA) 62.5 mcg/actuation inhalation capsule Comments:   Reason for Stopping:               _________________________________  Nicole Redmond MD  09/13/2023

## 2023-09-13 NOTE — PLAN OF CARE
Problem: Infection  Goal: Absence of Infection Signs and Symptoms  Outcome: Ongoing, Progressing     Problem: Device-Related Complication Risk (Hemodialysis)  Goal: Safe, Effective Therapy Delivery  Outcome: Ongoing, Progressing     Problem: Hemodynamic Instability (Hemodialysis)  Goal: Effective Tissue Perfusion  Outcome: Ongoing, Progressing     Problem: Infection (Hemodialysis)  Goal: Absence of Infection Signs and Symptoms  Outcome: Ongoing, Progressing     Problem: Electrolyte Imbalance (Chronic Kidney Disease)  Goal: Electrolyte Balance  Outcome: Ongoing, Progressing     Problem: Diabetes Comorbidity  Goal: Blood Glucose Level Within Targeted Range  Outcome: Ongoing, Progressing     Problem: Skin Injury Risk Increased  Goal: Skin Health and Integrity  Outcome: Ongoing, Progressing     Problem: Fall Injury Risk  Goal: Absence of Fall and Fall-Related Injury  Outcome: Ongoing, Progressing

## 2023-09-13 NOTE — PROGRESS NOTES
09/13/23 1200   WOCN Assessment   WOCN Total Time (mins) 20   Visit Date 09/13/23   Visit Time 1200   Consult Type New   WOCN Speciality Wound   Intervention assessed;changed;applied;chart review;coordination of care;orders        Altered Skin Integrity 09/13/23 1200 Right anterior Wrist   Date First Assessed/Time First Assessed: 09/13/23 1200   Side: Right  Orientation: anterior  Location: Wrist   Wound Image    Description of Altered Skin Integrity Partial thickness tissue loss. Shallow open ulcer with a red or pink wound bed, without slough. Intact or Open/Ruptured Serum-filled blister.   Dressing Appearance Open to air   Drainage Amount None   Red (%), Wound Tissue Color 100 %   Periwound Area Intact;Dry   Wound Length (cm) 1.5 cm   Wound Width (cm) 0.5 cm   Wound Depth (cm) 0.1 cm   Wound Volume (cm^3) 0.075 cm^3   Wound Surface Area (cm^2) 0.75 cm^2     [facility] Surg  Wound Care    Patient Name:  Cosme Lewis   MRN:  [id number]  Date: 9/13/2023  Diagnosis: Acute on chronic respiratory failure with hypoxia    History:     Past Medical History:   Diagnosis Date    CHF (congestive heart failure)     Chronic kidney disease-mineral and bone disorder 10/12/2022    Chronic respiratory failure     COPD (chronic obstructive pulmonary disease)     Diabetes mellitus type 1     ESRD on dialysis     Hypertension     Hypervolemia 02/22/2023    Hyponatremia 03/04/2023    Other insomnia     Recurrent major depression     SOB (shortness of breath) 10/12/2022    Patient with history COPD treated with Ellipta the albuterol, fluticasone-salmeterol tachypneic in the ED saturating 94% and 6 L on nasal cannula, patient does not know how many  he uses it is in nursing home.    -duo nebs Q 4  Given that patient is a poor historian, and in the setting of left lower extremity edema and history of coagulopathy PE cannot be ruled out.  Will workup for possible infec    Unspecified lack of coordination        Social History      Socioeconomic History    Marital status:    Tobacco Use    Smoking status: Never    Smokeless tobacco: Never   Substance and Sexual Activity    Alcohol use: Not Currently    Drug use: Not Currently    Sexual activity: Not Currently     Social Determinants of Health     Financial Resource Strain: Low Risk  (8/22/2023)    Overall Financial Resource Strain (CARDIA)     Difficulty of Paying Living Expenses: Not hard at all   Food Insecurity: No Food Insecurity (8/22/2023)    Hunger Vital Sign     Worried About Running Out of Food in the Last Year: Never true     Ran Out of Food in the Last Year: Never true   Transportation Needs: No Transportation Needs (8/22/2023)    PRAPARE - Transportation     Lack of Transportation (Medical): No     Lack of Transportation (Non-Medical): No   Physical Activity: Inactive (8/22/2023)    Exercise Vital Sign     Days of Exercise per Week: 0 days     Minutes of Exercise per Session: 0 min   Stress: Stress Concern Present (8/22/2023)    [place] [employer] - Occupational Stress Questionnaire     Feeling of Stress : To some extent   Social Connections: Unknown (8/22/2023)    Social Connection and Isolation Panel [NHANES]     Frequency of Communication with Friends and Family: Patient refused     Frequency of Social Gatherings with Friends and Family: Patient refused     Attends Jain Services: Never     Active Member of Clubs or Organizations: No     Attends Club or Organization Meetings: Never     Marital Status: Never    Housing Stability: Low Risk  (8/22/2023)    Housing Stability Vital Sign     Unable to Pay for Housing in the Last Year: No     Number of Places Lived in the Last Year: 1     Unstable Housing in the Last Year: No       Precautions:     Allergies as of 09/11/2023    (No Known Allergies)       WOC Assessment Details/Treatment   Patient consult for dry skin and edema. Dry skin treatment to moisturize with a moisture cream from the  supply closet on the unit. Consult the attending for edema. The patient have a fluid fill blister to right wrist, this site was cleanse with normal saline and a mepilex border applied. This treatment to the right wrist is every three days and prn.    Recommendations made to primary team for  the above Orders placed.     09/13/2023

## 2023-09-13 NOTE — SUBJECTIVE & OBJECTIVE
Interval History: NINO MEJIA. Evaluated at bedside today with patient's mother present. No emergent dialysis needs today. Patient reports wanting to go back to NH. Discussed the need for inpatient dialysis tomorrow per Nephrology and that his outpatient dialysis clinic will need to be updated on his current HD needs. Patient reports understanding. He has no complaints at this time, denying HA, nausea, vomiting, chest pain, worsening SOB, diarrhea, or constipation.    Review of Systems   Constitutional:  Negative for activity change, chills and fever.   HENT:  Positive for facial swelling (periorbital, perioral). Negative for congestion, drooling and trouble swallowing.    Eyes:  Negative for photophobia and visual disturbance.   Respiratory:  Positive for shortness of breath (improved). Negative for cough, chest tightness and wheezing.         Orthopnea    Cardiovascular:  Positive for leg swelling. Negative for chest pain and palpitations.   Gastrointestinal:  Negative for abdominal distention, abdominal pain, constipation, diarrhea, nausea and vomiting.   Endocrine: Negative for cold intolerance and heat intolerance.   Genitourinary:  Negative for difficulty urinating, dysuria, frequency, hematuria and urgency.   Musculoskeletal:  Negative for arthralgias, back pain and gait problem.   Skin:  Negative for color change, pallor and rash.   Allergic/Immunologic: Negative for immunocompromised state.   Neurological:  Negative for dizziness, syncope, weakness, light-headedness, numbness and headaches.   Psychiatric/Behavioral:  Positive for confusion (resolved). Negative for agitation and behavioral problems. The patient is not nervous/anxious.      Objective:     Vital Signs (Most Recent):  Temp: 97.4 °F (36.3 °C) (11/27/22 1131)  Pulse: 69 (11/27/22 1131)  Resp: 20 (11/27/22 1131)  BP: 135/72 (11/27/22 1131)  SpO2: (!) 92 % (11/27/22 1131)   Vital Signs (24h Range):  Temp:  [97.4 °F (36.3 °C)-98.4 °F (36.9 °C)]  97.4 °F (36.3 °C)  Pulse:  [68-81] 69  Resp:  [16-20] 20  SpO2:  [90 %-96 %] 92 %  BP: (121-150)/(57-78) 135/72     Weight: 84.8 kg (187 lb)  Body mass index is 27.62 kg/m².    Intake/Output Summary (Last 24 hours) at 2022 1316  Last data filed at 2022 1514  Gross per 24 hour   Intake 200 ml   Output --   Net 200 ml      Physical Exam  Vitals and nursing note reviewed.   Constitutional:       General: He is not in acute distress.     Appearance: He is well-developed.   HENT:      Head: Normocephalic and atraumatic.      Comments: Periorbital and perioral facial swelling      Mouth/Throat:      Pharynx: No oropharyngeal exudate.   Eyes:      Conjunctiva/sclera: Conjunctivae normal.      Pupils: Pupils are equal, round, and reactive to light.   Cardiovascular:      Rate and Rhythm: Normal rate and regular rhythm.      Heart sounds: Normal heart sounds.      Arteriovenous access: Left arteriovenous access is present.  Pulmonary:      Effort: Pulmonary effort is normal. No respiratory distress.      Breath sounds: No stridor. Rales (diffuse coarse rales througout lung fields) present. No wheezing or rhonchi.      Comments: on 3 L NC  Abdominal:      General: Bowel sounds are normal. There is no distension.      Palpations: Abdomen is soft.      Tenderness: There is no abdominal tenderness.   Musculoskeletal:         General: No tenderness. Normal range of motion.      Cervical back: Normal range of motion and neck supple.      Right lower le+ Pitting Edema present.      Left lower le+ Pitting Edema present.   Lymphadenopathy:      Cervical: No cervical adenopathy.   Skin:     General: Skin is warm and dry.      Capillary Refill: Capillary refill takes less than 2 seconds.      Findings: No rash.   Neurological:      Mental Status: He is alert and oriented to person, place, and time.      Cranial Nerves: No cranial nerve deficit.      Sensory: No sensory deficit.      Coordination: Coordination  normal.   Psychiatric:         Behavior: Behavior normal.         Thought Content: Thought content normal.         Judgment: Judgment normal.       Significant Labs: All pertinent labs within the past 24 hours have been reviewed.    Significant Imaging: I have reviewed all pertinent imaging results/findings within the past 24 hours.   Render If Medication Purchased By Clinic In Visit Note?: Yes Detail Level: Detailed Date Of Next Injection: 4 Weeks Ndc (80 Mg/Ml Pen): [address]-71 Include J-Code In Bill: No Consent: The risks of pain and injection site reactions were reviewed with the patient prior to the injection. Ndc (80 Mg/Ml Syringe): [address]-56 45624 Billing Preferences: 1 Taltz Amount: 80 mg J-Code:  Syringe Size Used (Required For Enhanced Ndc): 80 mg/ml Prefilled Pen Additional Comments: Taltz (Ixekizumab) IL17a blocker\\nQ4 wks\\nBaseline labs: CBC, CMP, hepatitis b,c; quant gold TB\\nTaltz syringes are 80mg\\n<25 kg -->40 mg load/20 mg q month\\n25 to 50 kg--> 80 mg load/40 mg q month\\n>50 kg -->160 mg load (two 80mg syring)/80 mg q month\\nExpel the Taltz from 80mg syringe, draw up 0.25 mL for 20 mg; 0.5 mL for 40 mg

## 2023-09-13 NOTE — NURSING
3 hours HD tx completed. 2 L of fluid removed.  Patient tolerated well.    Blood returned. Needles pulled. Manual pressure held until hemostasis was achieved.     Report given to CAMILA Mccoy LPN.

## 2023-09-13 NOTE — PLAN OF CARE
ADALGISA met with patient and advised patient of discharge back to [facility].  Pt received dialysis today.  ADALGISA notified patient's mother, Kassandra [phone], of discharge.      ADALGISA scheduled d/c transportation to [facility] through [facility]. Patient is scheduled to be picked up at 5:30p.m.. ADALGISA provided patient's nurse with report number [phone]; ask for the nurse for room 209B.  ADALGISA is in communication with patient's CM and patient's Care Team.  Pt will discharge via wheelchair/room air.       09/13/23 1702   Post-Acute Status   Post-Acute Authorization Placement   Post-Acute Placement Status Set-up Complete/Auth obtained  ([facility])   Discharge Delays None known at this time   Discharge Plan   Discharge Plan A Return to nursing home   Discharge Plan B Return to Nursing Home     Nona Mcmahan LMSW  PRN-  [facility]  Ext. [phone]

## 2023-09-13 NOTE — NURSING
Nurses Note -- 4 Eyes      9/12/2023   11:34 PM      Skin assessed during: Admit      [x] No Altered Skin Integrity Present    []Prevention Measures Documented      [] Yes- Altered Skin Integrity Present or Discovered   [] LDA Added if Not in Epic (Describe Wound)   [] New Altered Skin Integrity was Present on Admit and Documented in LDA   [] Wound Image Taken    Wound Care Consulted? No    Attending Nurse:  Annie Lagunas RN/Staff Member:   Pito Thrasher RN

## 2023-09-19 NOTE — PROGRESS NOTES
HPI    VA OU not good with glasses for distance but okay for reading with   glasses.  Eye meds: None  Last edited by Aisha Sierra on 9/19/2023  9:36 AM.         A/P    ICD-10-CM ICD-9-CM   1. Moderate nonproliferative diabetic retinopathy of both eyes with macular edema associated with type 2 diabetes mellitus  E11.3313 250.50     362.07     362.05   2. Retinal tear, right  H33.311 361.00   3. Age-related nuclear cataract of both eyes  H25.13 366.16           1. Moderate nonproliferative diabetic retinopathy of both eyes with macular edema associated with type 2 diabetes mellitus  Inpatient f/u for DR/DME check  PCP  Eliecer Ohara III, MD  07/27/2023  10.7  A1C    No injections or laser OU    Exam today with mod DR with stable IRF, no NV   Plan: Observation, consider avastin if worsening , will extend to 6m follow ups  Recommend good blood pressure control, tight blood glucose control, and good cholesterol control     2. Retinal tear, right  Initial visit- Pt with floaters, occ flash, exam notable for 7oclock tear with tr SRF    S/p LRX 5/16/23    Today good laser, no new RT/RD  Plan: Observation  Pathology of PVD, Retinal Tear, Retinal Detachment reviewed in great detail  RD precautions discussed in detail, patient expressed understanding  RTC immediately PRN (especially ANY change flashes, floaters, vision, visual field)     3. Age-related nuclear cataract of both eyes  Mild NS, NVS  Plan: Needs updated Mrx       RTC 6 months DFE/OCTm OU monitor DME, poss Avastin OU  RTC Optom updated Mrx      I saw and examined the patient and reviewed in detail the findings documented. The final examination findings, image interpretations, and plan as documented in the record represent my personal judgment and conclusions.    Micah Head MD  Vitreoretinal Surgery   [facility]

## 2023-09-25 PROBLEM — I26.94 MULTIPLE SUBSEGMENTAL PULMONARY EMBOLI WITHOUT ACUTE COR PULMONALE: Chronic | Status: RESOLVED | Noted: 2022-10-13 | Resolved: 2023-01-01

## 2023-09-29 NOTE — DISCHARGE SUMMARY
Discharge Summary  Hospital Medicine    Attending Provider on Discharge: Nicole Redmond MD  Hospital Medicine Team: [facility]  Date of Admission:  9/11/2023     Date of Discharge:  9/13/2023  Code status: Full Code    Active Hospital Problems    Diagnosis  POA    *Acute on chronic respiratory failure with hypoxia [J96.21]  Yes    Type 2 diabetes mellitus with kidney complication, without long-term current use of insulin [E11.29]  Yes    History of pulmonary embolism [Z86.711]  Yes    Acute on chronic diastolic heart failure [I50.33]  Yes    Hypertension [I10]  Yes    Epistaxis [R04.0]  Yes    COPD (chronic obstructive pulmonary disease) [J44.9]  Yes    HLD (hyperlipidemia) [E78.5]  Yes     Chronic    ESRD (end stage renal disease) [N18.6]  Yes      Resolved Hospital Problems   No resolved problems to display.     LENA Lewis is a 58 y.o. male with a PMHx of CHF, COPD, chronic respiratory failure, ESRD, Hx PE on eliquis who presents to [facility] from [facility] for evaluation of epistaxis. Patient is a poor historian. He reports persistent nose bleeding from both nares since this morning. Bleeding controlled on arrival to the ED and no further episodes since admit. He also complains of shortness of breath since he missed his HD session due to the epistaxis. Denies any nasal trauma recently. Reports shortness of breath worsens with minimal exertion and with lying flat. He also complains of left hamstring pain which has been going on for quite some time without recent worsening. He is unsure of his baseline O2 needs. Denies fever, chills, cough, wheezing, chest pain N/V, abdominal pain, HA, vision changes or syncope.    ED: hypertensive to 234/109, satting 94% on 6L NC>> weaned to 4L NC. No leukocytosis or electrolyte abnormalities. CMP consistent with ESRD. BNP 3818, trop 0.043. EKG with nonspecific ST changes in lateral leads. CXR shows pulmonary edema.     Hospital Course  * Acute on chronic respiratory failure  with hypoxia  - due to volume overload from missed HD  - satting 96% on 4L NC, pt unsure of baseline O2 needs  - CXR with pulmonary edema  - nephro consulted for iHD  - ABG ordered  - repeat echo     Left Ventricle: The left ventricle is normal in size. Increased wall thickness. There is concentric hypertrophy. Mild global hypokinesis present. There is mildly reduced systolic function with a visually estimated ejection fraction of 40 - 45%. There is indeterminate diastolic function.    Right Ventricle: Normal right ventricular cavity size. Right ventricle wall motion  is normal. Systolic function is borderline low.    Left Atrium: Left atrium is severely dilated.    Tricuspid Valve: There is mild regurgitation.    Pericardium: There is a trivial effusion. Echodensity seen adjacent to the visceral pericardium of uncertain significance. This may be pericardial fat although other etiologies not excluded. Correlate clinically.    PASP is at least 46 mmHg  - supplemental O2 to maintain sats >88%  Patient was able to tolerated his baseline 2L of oxygen at discharge    Type 2 diabetes mellitus with kidney complication, without long-term current use of insulin  - diet controlled  - LDSSI, ACHS accuchecks  Lab Results   Component Value Date    HGBA1C 10.7 (H) 07/18/2023     History of pulmonary embolism  - hold AM dose of eliquis given epistaxis pending  - if H/H stable in AM and no further episode of nose bleed, can resume in AM  -apixaban resumed on 5 mg BID    Acute on chronic diastolic heart failure  - decompensated in the setting of missed HD  - BNP 3818, CXR with pulm edema  - HD for volume management   - follow up echo  - strict I/Os, daily weights   - received dialysis    Hypertension  - uncontrolled in the setting of missed HD and night meds  - resume home lisinopril 40mg qhs, nifedipine 60mg BID, imdur 30mg daily, and coreg 12.5mg BID  - dialysis in AM    Epistaxis  - recurrent issue, bleeding controlled upon  arrival to the ED  - uncontrolled HTN in the setting of eliquis use likely cause  - strict BP control as above  - discharged on prn afrin  -saline nasal spray and ointment daily  -humidified oxygen    COPD (chronic obstructive pulmonary disease)  - no increased cough or wheezing recent  - CXR without focal consolidation  - continue daily controlled and prn nebs    HLD (hyperlipidemia)  - continue statin, hold AM dose of ASA incase epistaxis returns     Chronic diastolic heart failure    ESRD (end stage renal disease)  - MWF HD, last dialyzed Friday 9/8  - nephro consulted for iHD  - monitor lytes   - continue phos binders    Procedures: none    Consultants: nephrology    Discharge Medication List as of 9/13/2023  9:55 PM        START taking these medications    Details   diclofenac sodium (VOLTAREN) 1 % Gel Apply 2 g topically 4 (four) times daily as needed (pain)., Starting Wed 9/13/2023, No Print      LIDOcaine 4 % PtMd Apply 2 patches topically once daily. Apply to area of greatest pain, Starting Wed 9/13/2023, No Print      mineral oil-hydrophil petrolat (AQUAPHOR) Oint Apply topically 2 (two) times a day. Bilateral hands, Starting Wed 9/13/2023, Normal      oxymetazoline (AFRIN, OXYMETAZOLINE,) 0.05 % nasal spray 3 sprays by Nasal route every 30 (thirty) minutes as needed (nose bleed). 3 sprays to nostril with bleed. Pinch afterward for 15 minutes., Starting Wed 9/13/2023, No Print           CONTINUE these medications which have CHANGED    Details   acetaminophen (TYLENOL) 650 MG TbSR Take 1 tablet (650 mg total) by mouth every 6 (six) hours as needed (pain or fever over 100.4)., Starting Wed 9/13/2023, No Print      atorvastatin (LIPITOR) 40 MG tablet Take 1 tablet (40 mg total) by mouth every evening., Starting Wed 9/13/2023, Until Thu 9/12/2024, Normal      LEVEMIR FLEXPEN 100 unit/mL (3 mL) InPn pen Inject 20 Units into the skin every evening., Starting Wed 9/13/2023, No Print      sevelamer carbonate  (RENVELA) 800 mg Tab Take 1 tablet (800 mg total) by mouth 3 (three) times daily with meals., Starting Wed 9/13/2023, No Print      sodium chloride (SALINE NASAL) 0.65 % nasal spray 2 sprays by Nasal route 3 (three) times daily., Starting Wed 9/13/2023, No Print      white petrolatum (VASELINE) ointment Apply topically 2 (two) times a day. Apply small amount to both nostrils., Starting Wed 9/13/2023, Print           CONTINUE these medications which have NOT CHANGED    Details   albuterol (PROVENTIL/VENTOLIN HFA) 90 mcg/actuation inhaler Inhale 2 puffs into the lungs 4 (four) times daily as needed (breathing)., Starting Fri 9/2/2022, Historical Med      apixaban (ELIQUIS) 5 mg Tab Take 5 mg by mouth 2 (two) times daily., Historical Med      aspirin (ECOTRIN) 81 MG EC tablet Take 81 mg by mouth once daily., Starting Wed 9/28/2022, Historical Med      carvediloL (COREG) 12.5 MG tablet Take 1 tablet (12.5 mg total) by mouth 2 (two) times daily., Starting Tue 8/29/2023, Until Wed 8/28/2024, No Print      cetirizine (ZYRTEC) 10 MG tablet Take 10 mg by mouth daily as needed (itching)., Historical Med      cloNIDine 0.3 mg/24 hr td ptwk (CATAPRES) 0.3 mg/24 hr APPLY ONE PATCH TOPICALLY EVERY WEEK FOR HIGH BLOOD PRESSURE, Historical Med      FLUoxetine 40 MG capsule Take 40 mg by mouth once daily., Starting Sun 10/2/2022, Historical Med      fluticasone-salmeterol diskus inhaler 250-50 mcg Inhale 1 puff into the lungs 2 (two) times daily., Starting Tue 6/7/2022, Historical Med      isosorbide mononitrate (IMDUR) 30 MG 24 hr tablet Take 1 tablet (30 mg total) by mouth once daily., Starting Tue 3/28/2023, Normal      lisinopriL (PRINIVIL,ZESTRIL) 40 MG tablet Take 1 tablet (40 mg total) by mouth every evening., Starting Tue 3/28/2023, Until Wed 3/27/2024, Normal      melatonin 3 mg TbDL Take 9 mg by mouth nightly as needed (sleep)., Historical Med      NIFEdipine (PROCARDIA-XL) 60 MG (OSM) 24 hr tablet Take 1 tablet (60 mg  total) by mouth 2 (two) times a day., Starting Wed 3/29/2023, Until Thu 3/28/2024, Normal      nut.tx.imp.renal fxn,lac-reduc (NEPRO CARB STEADY ORAL) Give 1 carton by mouth with each meal., Historical Med      ondansetron (ZOFRAN) 8 MG tablet Take 1 tablet by mouth 3 (three) times daily as needed for Nausea., Starting Fri 3/10/2023, Historical Med      tiotropium bromide (SPIRIVA RESPIMAT) 2.5 mcg/actuation inhaler Inhale 2 puffs into the lungs Daily., Starting Fri 9/2/2022, Historical Med      vitamin renal formula, B-complex-vitamin c-folic acid, (NEPHROCAP) 1 mg Cap Take 1 capsule by mouth once daily., Starting Wed 9/28/2022, Historical Med           STOP taking these medications       albuterol-ipratropium (DUO-NEB) 2.5 mg-0.5 mg/3 mL nebulizer solution Comments:   Reason for Stopping:         epoetin xavier (PROCRIT) 10,000 unit/mL injection Comments:   Reason for Stopping:         GLUCAGON EMERGENCY KIT, HUMAN, 1 mg injection Comments:   Reason for Stopping:         HUMALOG KWIKPEN INSULIN 100 unit/mL pen Comments:   Reason for Stopping:         HYDROPHILIC CREAM TOP Comments:   Reason for Stopping:         umeclidinium (INCRUSE ELLIPTA) 62.5 mcg/actuation inhalation capsule Comments:   Reason for Stopping:               Discharge Diet:renal diet 1200 mL resctriction    Activity: activity as tolerated    Discharge Condition: Good    Disposition: Home or Self Care    Tests pending at the time of discharge: none      Time spent  on the discharge of the patient including review of hospital course with the patient. reviewing discharge medications and arranging follow-up care 35 min    Discharge examination Patient was seen and examined on the date of discharge and determined to be suitable for discharge.    Discharge plan     Future Appointments   Date Time Provider Department Center   11/16/2023  9:30 AM Snow Calle NP [facility] Jeff nigel   1/25/2024  8:40 AM Elizabeth Crabtree OD [facility]        Nicole Redmond MD

## 2023-10-11 PROBLEM — J96.02 ACUTE RESPIRATORY FAILURE WITH HYPOXIA AND HYPERCAPNIA: Status: ACTIVE | Noted: 2023-01-01

## 2023-10-11 PROBLEM — J96.01 ACUTE RESPIRATORY FAILURE WITH HYPOXIA AND HYPERCAPNIA: Status: ACTIVE | Noted: 2023-01-01

## 2023-10-11 NOTE — CONSULTS
Patient seen and evaluated by critical care medicine. To be admitted to ICU for further management. Full H&P to follow.     ANA Hayden, [facility]  Pulmonary Critical Care Medicine   10/11/2023

## 2023-10-11 NOTE — ASSESSMENT & PLAN NOTE
ESRD on iHD MWF  [facility]  4 hours  EDW:  71 kg  UMA AVF    Plan/Recommendations:  -HD @ bedside for volume optimization/metabolic clearance  -dialysate bath adjusted to current electrolytes  -UFG 3L as tolerated  -continue strict I/O's  -RFP daily  -will re-evaluate in AM for further needs

## 2023-10-11 NOTE — CONSULTS
[facility] - Cardiac Medical ICU  Nephrology  Consult Note    Patient Name: Cosme Lewis  MRN: [id number]  Admission Date: 10/11/2023  Hospital Length of Stay: 0 days  Attending Provider: Victor M Mendez MD   Primary Care Physician: Eliecer Ohara III, MD  Principal Problem:Chronic hypoxemic respiratory failure    Inpatient consult to Nephrology  Consult performed by: Juventino Mesa, NP  Consult ordered by: Marni Calderón MD  Reason for consult: ESRD        Subjective:     HPI: Cosme Lewis is a 57 yo male with PMHx of ESRD on iHD MWF who presents from his NH with AMS, hypoxia, and hypoglycemia.  He was placed on BiPAP and transferred to ICU for higher level of care.  CXR with evidence of pulmonary edema, BNP elevated > 3800.  Labs on chemistry without emergent electrolyte abnormalities consistent with ESRD status.  CBC without leukocytosis.  He was found to be COVID + and started on Broad spectrum Abx and steroids. He required D5W gtt for persistent hypoglycemia.  Nephrology has been consulted for ESRD management while IP.      Past Medical History:   Diagnosis Date    CHF (congestive heart failure)     Chronic kidney disease-mineral and bone disorder 10/12/2022    Chronic respiratory failure     COPD (chronic obstructive pulmonary disease)     Diabetes mellitus type 1     ESRD on dialysis     Hypertension     Hypervolemia 02/22/2023    Hyponatremia 03/04/2023    Other insomnia     Recurrent major depression     SOB (shortness of breath) 10/12/2022    Patient with history COPD treated with Ellipta the albuterol, fluticasone-salmeterol tachypneic in the ED saturating 94% and 6 L on nasal cannula, patient does not know how many  he uses it is in nursing home.    -duo nebs Q 4  Given that patient is a poor historian, and in the setting of left lower extremity edema and history of coagulopathy PE cannot be ruled out.  Will workup for possible infec    Unspecified lack of coordination        Past Surgical History:    Procedure Laterality Date    AV FISTULA PLACEMENT      FISTULOGRAM Left 8/20/2023    Procedure: Fistulogram;  Surgeon: Duong Aceves MD;  [facility];  Service: Vascular;  Laterality: Left;  1.6 MIN  55.57 mGy  10.4137 Gycm2  24 ml dye  4 ml transradial flush    PHLEBOGRAPHY  8/20/2023    Procedure: VENOGRAM, CENTRAL;  Surgeon: Duong Aceves MD;  [facility];  Service: Vascular;;       Review of patient's allergies indicates:  No Known Allergies  Current Facility-Administered Medications   Medication Frequency    acetaminophen tablet 650 mg Q6H PRN    albuterol-ipratropium 2.5 mg-0.5 mg/3 mL nebulizer solution 3 mL Q6H    ascorbic acid (vitamin C) tablet 500 mg BID    dextrose 10 % infusion Continuous    dextrose 10% bolus 125 mL 125 mL PRN    dextrose 10% bolus 250 mL 250 mL PRN    dextrose 20 % infusion Continuous    doxycycline (VIBRAMYCIN) 100 mg in dextrose 5 % in water (D5W) 100 mL IVPB (MB+) Q12H    famotidine (PF) injection 20 mg Daily    fentaNYL 2500 mcg in 0.9% sodium chloride 250 mL infusion premix (titrating) Continuous    glucagon (human recombinant) injection 1 mg PRN    glucose chewable tablet 16 g PRN    glucose chewable tablet 24 g PRN    heparin (porcine) injection 5,000 Units Q12H    hydrocortisone sodium succinate injection 100 mg Q6H    multivitamin tablet Daily    phenylephrine HCl in 0.9% NaCl 1 mg/10 mL (100 mcg/mL) syringe     piperacillin-tazobactam (ZOSYN) 4.5 g in dextrose 5 % in water (D5W) 100 mL IVPB (MB+) Q12H    propofol (DIPRIVAN) 10 mg/mL infusion Continuous    [START ON 10/12/2023] remdesivir 100 mg in sodium chloride 0.9% 250 mL infusion Daily    sodium chloride 0.9% flush 10 mL PRN    vancomycin - pharmacy to dose pharmacy to manage frequency    vancomycin 1,250 mg in dextrose 5 % (D5W) 250 mL IVPB (Vial-Mate) Once     Family History       Problem Relation (Age of Onset)    Diabetes Mother          Tobacco  Use    Smoking status: Never    Smokeless tobacco: Never   Substance and Sexual Activity    Alcohol use: Not Currently    Drug use: Not Currently    Sexual activity: Not Currently     Review of Systems   Unable to perform ROS: Acuity of condition     Objective:     Vital Signs (Most Recent):  Temp: 98.2 °F (36.8 °C) (10/11/23 1100)  Pulse: 68 (10/11/23 1230)  Resp: (!) 31 (10/11/23 1230)  BP: (!) 153/70 (10/11/23 1230)  SpO2: 99 % (10/11/23 1230) Vital Signs (24h Range):  Temp:  [94.2 °F (34.6 °C)-98.8 °F (37.1 °C)] 98.2 °F (36.8 °C)  Pulse:  [62-92] 68  Resp:  [15-32] 31  SpO2:  [79 %-100 %] 99 %  BP: (121-220)/() 153/70     Weight: 70 kg (154 lb 5.2 oz) (10/11/23 0030)  Body mass index is 22.79 kg/m².  Body surface area is 1.85 meters squared.    I/O last 3 completed shifts:  In: 571.1 [I.V.:335.8; IV Piggyback:235.3]  Out: -      Physical Exam  Vitals and nursing note reviewed.   Constitutional:       General: He is in acute distress.      Appearance: He is well-developed. He is ill-appearing.      Comments: BiPAP   HENT:      Head: Normocephalic and atraumatic.      Nose: No congestion or rhinorrhea.   Eyes:      General: No scleral icterus.        Right eye: No discharge.         Left eye: No discharge.      Extraocular Movements: Extraocular movements intact.      Conjunctiva/sclera: Conjunctivae normal.   Neck:      Vascular: JVD present.   Cardiovascular:      Rate and Rhythm: Normal rate and regular rhythm.      Heart sounds: No murmur heard.     No friction rub. No gallop.      Comments: UMA AVF  Pulmonary:      Effort: Respiratory distress present.      Breath sounds: Rhonchi and rales present. No wheezing.   Abdominal:      General: There is no distension.      Palpations: Abdomen is soft.      Tenderness: There is no abdominal tenderness.   Musculoskeletal:         General: No swelling.      Cervical back: Normal range of motion.      Right lower leg: No edema.      Left lower leg: No edema.    Skin:     General: Skin is warm and dry.   Neurological:      Mental Status: He is lethargic.   Psychiatric:         Behavior: Behavior is uncooperative.          Significant Labs:  CBC:   Recent Labs   Lab 10/11/23  0409   WBC 2.81*   RBC 2.80*   HGB 8.2*   HCT 26.3*   *   MCV 94   MCH 29.3   MCHC 31.2*     CMP:   Recent Labs   Lab 10/11/23  0409 10/11/23  0535   GLU 47* 95   CALCIUM 7.5* 7.6*   ALBUMIN 2.7*  --    PROT 5.7*  --    * 132*   K 3.6 3.9   CO2 20* 22*   CL 99 96   BUN 40* 41*   CREATININE 3.5* 3.7*   ALKPHOS 212*  --    ALT 56*  --    AST 75*  --    BILITOT 0.8  --          Assessment/Plan:     Renal/  ESRD (end stage renal disease)  ESRD on iHD MWF  [facility] [place]  4 hours  EDW:  71 kg  UMA AVF    Plan/Recommendations:  -HD @ bedside for volume optimization/metabolic clearance  -dialysate bath adjusted to current electrolytes  -UFG 3L as tolerated  -continue strict I/O's  -RFP daily  -will re-evaluate in AM for further needs    Oncology  Anemia in ESRD (end-stage renal disease)  Below goal for ESRD  -if here greater than 72 hours, will consider starting on MARCIA therapy      Endocrine  Hypoglycemia  -currently on D5W gtt  -recommend to switch to D10 gtt if able to limit high volume intake  -may need additional HD session tomorrow      Juventino Nguyen NP  Nephrology  [facility] - Card[facility] Medical [facility]

## 2023-10-11 NOTE — EICU
Intervention Initiated From:  Bedside    Natty intervened regarding:  Documentation    Comments: Called to bedside for intubation.  Pt with BiPAP mask in place.   1104 - propofol 50 mg given IVP  1105 - propofol 30 mg given IVP  1105 - rocuronium 70 mg given IVP  1106 - intubated under direct supervision of MD Mendez using glidescope.  ETT #7.5 placed.  Color change noted to ETCO2 detector.  Bilat breath sounds auscultated.  Tube secured at 23cm to gum line.  1108 - OGT placed.  Bubble auscultation performed.  CXR and KUB ordered.  Pt tolerated procedures well.

## 2023-10-11 NOTE — EICU
Intervention Initiated From:  Bedside    Natty intervened regarding:  Documentation    Comments: Called to bedside for central line placement.  Bedside states procedure being done emergently.  Physician verification completed.  Time out done using proper pt identifiers. Central line placed to (L) IJ by MD Hamlin.  Good blood return noted from all ports.  Sharmila flush easily.  Pt tolerated procedure well.  PCXR ordered.

## 2023-10-11 NOTE — ASSESSMENT & PLAN NOTE
-Outpatient HD unit: [facility]   -HD tx days: MWF   -HD tx time: 4hrs   -HD access: LUE AVF   -HD modality: iHD   -Residual urine: Anuric     -- Nephrology consulted for iHD management. Appreciate their assistance   -- CMP daily and trend   -- Avoid nephrotoxins   -- Renally dose all medications to appropriate GFR / CrCl   -- Estimated Creatinine Clearance: 21 mL/min (A) (based on SCr of 3.7 mg/dL (H)).  -- Hgb > 7 and MAP > 65 goals

## 2023-10-11 NOTE — ASSESSMENT & PLAN NOTE
Patient with Hypercapnic and Hypoxic Respiratory failure which is Chronic.  he is not on home oxygen. Supplemental oxygen was provided and noted- Vent Mode: A/C  Oxygen Concentration (%):  [] 60  Resp Rate Total:  [20 br/min-29 br/min] 20 br/min  Vt Set:  [400 mL] 400 mL  PEEP/CPAP:  [5 cmH20] 5 cmH20  Mean Airway Pressure:  [8.9 lwW84-75 cmH20] 10 cmH20    Signs/symptoms of respiratory failure include- tachypnea, increased work of breathing and use of accessory muscles. Contributing diagnoses includes - CHF, COPD and fluid volume excess Labs and images were reviewed. Patient Has recent ABG, which has been reviewed. Will treat underlying causes and adjust management of respiratory failure as follows-     58 year old male with multiple medical problems who presented [place] [facility] [place] for AMS, hypoglycemia, and hypoxia placed on NIPPV. Per chart review patient with room air saturation in 50s which improved with HFNC, however, he remained tachypnic with accessory muscle use, therefore he was placed on NIPPV. Unclear if patient requires oxygen at baseline. Previous hospitalizations with oxygen use. CXR with bilateral opacifications R > L and BNP elevated > 4900.     - Intubated and mechanically ventilated  - Monitoring VBGs  - Sputum culture pending  - Chest x-ray concerning for pneumonia vs ARDS. CAP coverage with Vancomycin, Zosyn and Doxycycline.  - Remdesivir for COVID  - Received HD (10/11), consider further HD if O2 status worsens. Nephrology following.

## 2023-10-11 NOTE — ASSESSMENT & PLAN NOTE
ECHO 09/23:       Left Ventricle: The left ventricle is normal in size. Increased wall thickness. There is concentric hypertrophy. Mild global hypokinesis present. There is mildly reduced systolic function with a visually estimated ejection fraction of 40 - 45%. There is indeterminate diastolic function.    Right Ventricle: Normal right ventricular cavity size. Right ventricle wall motion  is normal. Systolic function is borderline low.    Left Atrium: Left atrium is severely dilated.    Tricuspid Valve: There is mild regurgitation.    Pericardium: There is a trivial effusion. Echodensity seen adjacent to the visceral pericardium of uncertain significance. This may be pericardial fat although other etiologies not excluded. Correlate clinically.    PASP is at least 46 mmHg.    -- BNP > 4900. Volume removal per iHD. Appreciate nephrology assistance   -- GDMT as tolerated. Home meds list Imdur, Lisinopril, Nifedipine.   -- Fluid restrict to 1.5 L x 24 hours

## 2023-10-11 NOTE — ASSESSMENT & PLAN NOTE
-- Hold statin in the setting of elevated LFTs   -- CMP daily. Will resume Atorvastatin when LFTs improve

## 2023-10-11 NOTE — PROCEDURES
Central Line    Date/Time: 10/11/2023 4:44 PM    Performed by: Lance Baig MD  Authorized by: Lance Baig MD    Location procedure was performed:  [facility]  Assisting Provider:  Victor M Mendez MD  Pre-operative diagnosis:  Hypolygemia  Post-operative diagnosis:  Hypoglycemia  Consent Done ?:  Emergent Situation  Indications:  Med administration  Anesthesia:  Local infiltration  Local anesthetic:  Lidocaine 1% without epinephrine  Anesthetic total (ml):  3  Preparation:  Skin prepped with ChloraPrep  Skin prep agent dried: Skin prep agent completely dried prior to procedure    Sterile barriers: All five maximal sterile barriers used - gloves, gown, cap, mask and large sterile sheet    Hand hygiene: Hand hygiene performed immediately prior to central venous catheter insertion    Location:  Left internal jugular  Catheter type:  Triple lumen  Catheter size:  7 Fr  Ultrasound guidance: Yes    Manometry: No    Number of attempts:  1  Securement:  Line sutured, sterile dressing applied and blood return through all ports  Technical Procedures Used:  Shayydinger  Complications: No    Estimated blood loss (mL):  2  Specimens: No    Implants: No        Lance Baig MD

## 2023-10-11 NOTE — PROGRESS NOTES
[facility] - Cardiac Medical ICU  Critical Care Medicine  Progress Note    Patient Name: Cosme Lewis  MRN: [id number]  Admission Date: 10/11/2023  Hospital Length of Stay: 0 days  Code Status: Full Code  Attending Provider: Victor M Mendez MD  Primary Care Provider: Eliecer Ohara III, MD   Principal Problem: Chronic hypoxemic respiratory failure    Subjective:     HPI:  Mr. Lewis is a 58 year old male with PMH notable for COPD, HFrEF (45% and DD), ESRD on dialysis, HTN, DM presents via EMS from nursing home for altered mental status and hypoxia.  Per EMS, patient was found to have glucose of 38, he received D50 with improvement in his glucose.  He also had SpO2 58% which improved with 15 L nasal cannula.     ED work up revealed VBG 7.27/61 and he was subsequently placed on bipap for work of breathing. He was persistently hypoglycemic with BG 59 --> 34 despite IV dextrose, therefore he was started on a continuous dextrose infusion. Other lab work up revealed WBC 3, stable, H//H 9.5/29, Na 143, CO2 20, Cr/BUN 3.8/38, elevated AST/ALT/alk phos, and troponin 0.174 with no ischemic changes on EKG. He was given IV rocephin and doxycyline. Chest XR with bilateral opacifications R > L.    Critical care consulted for acute hypoxemic respiratory failure and NIPPV      Hospital/ICU Course:  In the MICU, the patient arrived on bipap but was found to still be hypoxic which initially improved after adjusting his bipap settings. He continued to be altered and tried occasionally became combative requiring precedex which was affective when maxed out at 1.4 mcg/kg/hour. During this time he was also found to have worsening hypoglycemia which required continuous D10 administration at a max rate of 200 cc/hour. Eventually his oxygen status started declining and the decision was made to intubate the patient. It is believed that worsening of his oxygen status was in part due the large volume of D10 he received to maintain his blood sugars. For this  reason, it was decided that D20 at a slower rate would be more appropriate, and for this a central line was placed. Additionally, he received a round of hemodialysis. He is receiving remdesivir for COVID and vacomycin/zosyn/doxycycline for community acquired pneumonia coverage/concerning chest x-ray.        Interval History/Significant Events: Intubation, and central line placement      Review of Systems   Reason unable to perform ROS: Unable to asscess as patient is intubated.     Objective:     Vital Signs (Most Recent):  Temp: 98.2 °F (36.8 °C) (10/11/23 1100)  Pulse: 63 (10/11/23 1700)  Resp: (!) 22 (10/11/23 1700)  BP: (!) 115/58 (10/11/23 1700)  SpO2: (!) 92 % (10/11/23 1700) Vital Signs (24h Range):  Temp:  [94.2 °F (34.6 °C)-98.8 °F (37.1 °C)] 98.2 °F (36.8 °C)  Pulse:  [62-92] 63  Resp:  [15-32] 22  SpO2:  [79 %-100 %] 92 %  BP: ()/() 115/58   Weight: 70 kg (154 lb 5.2 oz)  Body mass index is 22.79 kg/m².      Intake/Output Summary (Last 24 hours) at 10/11/2023 1808  Last data filed at 10/11/2023 1700  Gross per 24 hour   Intake 3353.92 ml   Output 3600 ml   Net -246.08 ml          Physical Exam  Vitals reviewed.   Constitutional:       Comments: Patient unresponsive and currently sedated    HENT:      Head: Normocephalic and atraumatic.      Nose: Nose normal.   Eyes:      Pupils: Pupils are equal, round, and reactive to light.   Cardiovascular:      Rate and Rhythm: Normal rate and regular rhythm.   Pulmonary:      Comments: Significant respiratory distress requiring mechanical ventilation. Wheezes and crackles are auscultated in all lung fields  Abdominal:      General: Abdomen is flat.      Palpations: Abdomen is soft.   Musculoskeletal:         General: No swelling or deformity.   Skin:     General: Skin is warm.      Coloration: Skin is not jaundiced.            Vents:  Vent Mode: A/C (10/11/23 1532)  Set Rate: 20 BPM (10/11/23 1532)  Vt Set: 400 mL (10/11/23 1532)  PEEP/CPAP: 5 cmH20  (10/11/23 1532)  Oxygen Concentration (%): 60 (10/11/23 1700)  Peak Airway Pressure: 24 cmH20 (10/11/23 1532)  Plateau Pressure: 0 cmH20 (10/11/23 1532)  Total Ve: 8.78 L/m (10/11/23 1532)  Negative Inspiratory Force (cm H2O): 0 (10/11/23 1532)  F/VT Ratio<105 (RSBI): (!) 48.78 (10/11/23 1532)  Lines/Drains/Airways       Central Venous Catheter Line  Duration             Percutaneous Central Line Insertion/Assessment - Triple Lumen  10/11/23 1501 Internal Jugular Left <1 day              Drain  Duration                  NG/OG Tube 10/11/23 1112 Ford sump Right mouth <1 day              Airway  Duration                  Airway - Non-Surgical 10/11/23 1108 Endotracheal Tube <1 day              Peripheral Intravenous Line  Duration                  Hemodialysis AV Fistula Left upper arm -- days         Peripheral IV - Single Lumen 10/11/23 0210 20 G Anterior;Right Saphenous <1 day         Peripheral IV - Single Lumen 10/11/23 0239 20 G Anterior;Right Upper Arm <1 day                  Significant Labs:    CBC/Anemia Profile:  Recent Labs   Lab 10/11/23  0055 10/11/23  0104 10/11/23  0409   WBC  --  3.50* 2.81*   HGB  --  9.5* 8.2*   HCT 32* 29.5* 26.3*   PLT  --  168 145*   MCV  --  94 94   RDW  --  16.6* 16.3*        Chemistries:  Recent Labs   Lab 10/11/23  0104 10/11/23  0409 10/11/23  0535   * 131* 132*   K 4.2 3.6 3.9   CL 98 99 96   CO2 20* 20* 22*   BUN 38* 40* 41*   CREATININE 3.8* 3.5* 3.7*   CALCIUM 8.0* 7.5* 7.6*   ALBUMIN 3.1* 2.7*  --    PROT 6.6 5.7*  --    BILITOT 0.8 0.8  --    ALKPHOS 239* 212*  --    ALT 69* 56*  --    AST 91* 75*  --    MG  --  2.0  --    PHOS  --  3.9  --        All pertinent labs within the past 24 hours have been reviewed.    Significant Imaging:  I have reviewed all pertinent imaging results/findings within the past 24 hours.      ABG  Recent Labs   Lab 10/11/23  1321   PH 7.405   PO2 64*   PCO2 35.5   HCO3 22.2*   BE -3     Assessment/Plan:     Pulmonary  * Chronic  hypoxemic respiratory failure  Patient with Hypercapnic and Hypoxic Respiratory failure which is Chronic.  he is not on home oxygen. Supplemental oxygen was provided and noted- Vent Mode: A/C  Oxygen Concentration (%):  [] 60  Resp Rate Total:  [20 br/min-29 br/min] 20 br/min  Vt Set:  [400 mL] 400 mL  PEEP/CPAP:  [5 cmH20] 5 cmH20  Mean Airway Pressure:  [8.9 mmF45-10 cmH20] 10 cmH20    Signs/symptoms of respiratory failure include- tachypnea, increased work of breathing and use of accessory muscles. Contributing diagnoses includes - CHF, COPD and fluid volume excess Labs and images were reviewed. Patient Has recent ABG, which has been reviewed. Will treat underlying causes and adjust management of respiratory failure as follows-     58 year old male with multiple medical problems who [place] [facility] [place] for AMS, hypoglycemia, and hypoxia placed on NIPPV. Per chart review patient with room air saturation in 50s which improved with HFNC, however, he remained tachypnic with accessory muscle use, therefore he was placed on NIPPV. Unclear if patient requires oxygen at baseline. Previous hospitalizations with oxygen use. CXR with bilateral opacifications R > L and BNP elevated > 4900.     - Intubated and mechanically ventilated  - Monitoring VBGs  - Sputum culture pending  - Chest x-ray concerning for pneumonia vs ARDS. CAP coverage with Vancomycin, Zosyn and Doxycycline.  - Remdesivir for COVID  - Received HD (10/11), consider further HD if O2 status worsens. Nephrology following.     COPD (chronic obstructive pulmonary disease)  Currently Intubated and mechanically ventilated     Cardiac/Vascular  HLD (hyperlipidemia)  -- Hold statin in the setting of elevated LFTs   -- CMP daily. Will resume Atorvastatin when LFTs improve     Chronic diastolic heart failure  ECHO 09/23:       Left Ventricle: The left ventricle is normal in size. Increased wall thickness. There is concentric hypertrophy. Mild global  hypokinesis present. There is mildly reduced systolic function with a visually estimated ejection fraction of 40 - 45%. There is indeterminate diastolic function.    Right Ventricle: Normal right ventricular cavity size. Right ventricle wall motion  is normal. Systolic function is borderline low.    Left Atrium: Left atrium is severely dilated.    Tricuspid Valve: There is mild regurgitation.    Pericardium: There is a trivial effusion. Echodensity seen adjacent to the visceral pericardium of uncertain significance. This may be pericardial fat although other etiologies not excluded. Correlate clinically.    PASP is at least 46 mmHg.    -- BNP > 4900. Volume removal per iHD. Appreciate nephrology assistance   -- Home BP meds discontinued as pressures have been within normal limits. Hydralazine push should pressures rise     Renal/  ESRD (end stage renal disease)  -Outpatient HD unit: [facility]   -HD tx days: MWF   -HD tx time: 4hrs   -HD access: LUE AVF   -HD modality: iHD   -Residual urine: Anuric     -- Nephrology consulted for iHD management. Appreciate their assistance   -- CMP daily and trend   -- Avoid nephrotoxins   -- Renally dose all medications to appropriate GFR / CrCl   -- Estimated Creatinine Clearance: 21 mL/min (A) (based on SCr of 3.7 mg/dL (H)).  -- Hgb > 7 and MAP > 65 goals     Hematology  History of pulmonary embolism  Diagnosed 10/2022  Repeat CT 3/2023 with resolution    -hold home eliquis for mental status change  -VTE ppx with heparin    Oncology  Anemia in ESRD (end-stage renal disease)  Recent Labs   Lab 10/11/23  0409   WBC 2.81*   RBC 2.80*   HGB 8.2*   HCT 26.3*   *   MCV 94   MCH 29.3   MCHC 31.2*     -- CBC daily and trend   -- Transfuse for hgb < 7   -- Receives EPO with iHD    Endocrine  Hyperglycemia due to type 2 diabetes mellitus  See hypoglycemia     Hypoglycemia  Hypoglycemic to 30s at nursing home and given dextrose. [place]ist[place] [facility] [place]  started on a continuous dextrose infusion    -- Insulin level elevated, C-peptide level normal   -- Q1H accu checks   -- Continue D20 infusion   -- Hypoglycemia protocol          Critical Care Daily Checklist:    A: Awake: RASS Goal/Actual Goal:    Actual:     B: Spontaneous Breathing Trial Performed?     C: SAT & SBT Coordinated?  Yes                      D: Delirium: CAM-ICU     E: Early Mobility Performed? No   F: Feeding Goal:    Status:     Current Diet Order   Procedures    Diet NPO      AS: Analgesia/Sedation Propofol, fentanyl   T: Thromboembolic Prophylaxis Heparin   H: HOB > 300 Yes   U: Stress Ulcer Prophylaxis (if needed) No   G: Glucose Control Receiving D20 for hypoglycemia    B: Bowel Function     I: Indwelling Catheter (Lines & Dutta) Necessity L-IJ, PIVs Dutta   D: De-escalation of Antimicrobials/Pharmacotherapies Vancomycin, Zosyn, Flagyl    Plan for the day/ETD Maintain blood sugars, treat infectious sources. Maintain stable blood pressure    Code Status:  Family/Goals of Care: Full Code         Critical secondary to Patient has a condition that poses threat to life and bodily function: Acute Renal Failure, persistent hypoglycemia and likely pulmonary infection vs ARDS.      Critical care was time spent personally by me on the following activities: development of treatment plan with patient or surrogate and bedside caregivers, discussions with consultants, evaluation of patient's response to treatment, examination of patient, ordering and performing treatments and interventions, ordering and review of laboratory studies, ordering and review of radiographic studies, pulse oximetry, re-evaluation of patient's condition. This critical care time did not overlap with that of any other provider or involve time for any procedures.     Robbie Fields MD  Critical Care Medicine  [facility] - Cardiac Medical ICU

## 2023-10-11 NOTE — CARE UPDATE
Attempted to call patient mother, Kassandra Leon - [phone]. Unsuccessful. Will continue to reach her.     ABDULKADIR Glynn, MSN, [facility]  Pulmonary Critical Care Medicine   10/11/2023

## 2023-10-11 NOTE — ED TRIAGE NOTES
Cosme Lewis, a 58 y.o. male presents to the ED via EMS from [facility] for hypoglycemia, hypoxia, and altered mental status. Initial O2 sat with EMS 50%, CBG 30. Received amp of D50 with EMS and arrived on 15 L non-rebreather.     Triage note:  Chief Complaint   Patient presents with    Hypoglycemia     Pt from [facility], initial CBG 38, given 25g D50 and now CBG 99.     Shortness of Breath     Initial SpO2 58% with snoring respirations. Arrives on non rebreather 15 L, SpO2 95%. Initially unresponsive to sternal rub, GCS 11. NPA in place.      Review of patient's allergies indicates:  No Known Allergies  Past Medical History:   Diagnosis Date    CHF (congestive heart failure)     Chronic kidney disease-mineral and bone disorder 10/12/2022    Chronic respiratory failure     COPD (chronic obstructive pulmonary disease)     Diabetes mellitus type 1     ESRD on dialysis     Hypertension     Hypervolemia 02/22/2023    Hyponatremia 03/04/2023    Other insomnia     Recurrent major depression     SOB (shortness of breath) 10/12/2022    Patient with history COPD treated with Ellipta the albuterol, fluticasone-salmeterol tachypneic in the ED saturating 94% and 6 L on nasal cannula, patient does not know how many  he uses it is in nursing home.    -duo nebs Q 4  Given that patient is a poor historian, and in the setting of left lower extremity edema and history of coagulopathy PE cannot be ruled out.  Will workup for possible infec    Unspecified lack of coordination

## 2023-10-11 NOTE — HOSPITAL COURSE
In the MICU, the patient arrived on bipap but was found to still be hypoxic which initially improved after adjusting his bipap settings. He continued to be altered and tried occasionally became combative requiring precedex which was affective when maxed out at 1.4 mcg/kg/hour. During this time he was also found to have worsening hypoglycemia which required continuous D10 administration at a max rate of 200 cc/hour. Eventually his oxygen status started declining and the decision was made to intubate the patient. It is believed that worsening of his oxygen status was in part due the large volume of D10 he received to maintain his blood sugars. For this reason, it was decided that D20 at a slower rate would be more appropriate, and for this a central line was placed. Additionally, he received a round of hemodialysis. He is receiving remdesivir for COVID and vacomycin/zosyn/doxycycline for community acquired pneumonia coverage/concerning chest x-ray. The patient's sugars started rising on the D20 and eventually he started becoming hyperglycemic  to the 220s which led to the D20 being stopped for the time being and since that time the sugars have been relatively normoglycemic. His abdomen was found to be slightly more distended but KUB was negative for bowel obstruction, likely suggesting ascites as the source.    On 10/12 after passing SAT and SBT, the decision was made to extubate the patient which was done successfully. All sedatives were turned off at this time as well. He was placed on high flow at the time and has been sating well. He continued to receive antibiotics as well as antivirals for CAP coverage/COVID respectively which was able to help improve his likely pneumonia vs ARDS.     Between 10/14 - 10/17 he went back and forth between requiring high flow and bipap, however starting on 10/17 he was able to tolerate nasal canula.    Between 10/18 - 10/22 his nasal canula was slowly weaned down to around 3L,  sometimes requiring more. This oxygen level requirement may represent a baseline. Additionally during this time, his blood sugars remained labile and difficult to manage, as overcorrection occurred multiple times. For this reason, endocrinology was consulted for assistance in creating a blood sugar regime.       10/23/23 Spoke with ID about his COVID precaution status.  They said if he is immunocompromised he needs to be in isolation 20 days after his positive COVID test.

## 2023-10-11 NOTE — ASSESSMENT & PLAN NOTE
Recent Labs   Lab 10/11/23  0409   WBC 2.81*   RBC 2.80*   HGB 8.2*   HCT 26.3*   *   MCV 94   MCH 29.3   MCHC 31.2*     -- CBC daily and trend   -- Transfuse for hgb < 7   -- Receives EPO with iHD

## 2023-10-11 NOTE — PROGRESS NOTES
Pt arrived to unit from ED with nurse Johnson.  Pt arrived on bipap and lethargic at first.  Pt quickly became very restless and agitated. Pt ripping off bipap and trying to pull out PIVs.  5L nasal canula placed due to patient unable to leave bipap in place-- pt then attempted to place NC in mouth, chewed it and attempted to swallow NC. Multiple team members at bedside with attempts to console patient- attempts unsuccessful and precedex initiated. Pt also had to receive IM dose of doperidol. Pt's O2 requirements increasing- team notified. Pt also wheezing. Pt now resting in bed-- precedex and fio2 being weaned back down. Report given to ADELITA Delgado. All concerns addressed

## 2023-10-11 NOTE — H&P
[facility] - Cardiac Medical ICU  Critical Care Medicine  History & Physical    Patient Name: Cosme Lewis  MRN: [id number]  Admission Date: 10/11/2023  Hospital Length of Stay: 0 days  Code Status: Full Code  Attending Physician: Victor M Mendez MD   Primary Care Provider: Eliecer Ohara III, MD   Principal Problem: Chronic hypoxemic respiratory failure    Subjective:     HPI:  Mr. Lewis is a 58 year old male with PMH notable for COPD, HFrEF (45% and DD), ESRD on dialysis, HTN, DM presents via EMS from nursing home for altered mental status and hypoxia.  Per EMS, patient was found to have glucose of 38, he received D50 with improvement in his glucose.  He also had SpO2 58% which improved with 15 L nasal cannula.     ED work up revealed VBG 7.27/61 and he was subsequently placed on bipap for work of breathing. He was persistently hypoglycemic with BG 59 --> 34 despite IV dextrose, therefore he was started on a continuous dextrose infusion. Other lab work up revealed WBC 3, stable, H//H 9.5/29, Na 143, CO2 20, Cr/BUN 3.8/38, elevated AST/ALT/alk phos, and troponin 0.174 with no ischemic changes on EKG. He was given IV rocephin and doxycyline. Chest XR with bilateral opacifications R > L.    Critical care consulted for acute hypoxemic respiratory failure and NIPPV      Hospital/ICU Course:  No notes on file     Past Medical History:   Diagnosis Date    CHF (congestive heart failure)     Chronic kidney disease-mineral and bone disorder 10/12/2022    Chronic respiratory failure     COPD (chronic obstructive pulmonary disease)     Diabetes mellitus type 1     ESRD on dialysis     Hypertension     Hypervolemia 02/22/2023    Hyponatremia 03/04/2023    Other insomnia     Recurrent major depression     SOB (shortness of breath) 10/12/2022    Patient with history COPD treated with Ellipta the albuterol, fluticasone-salmeterol tachypneic in the ED saturating 94% and 6 L on nasal cannula, patient does not know how many  he uses it  is in nursing home.    -duo nebs Q 4  Given that patient is a poor historian, and in the setting of left lower extremity edema and history of coagulopathy PE cannot be ruled out.  Will workup for possible infec    Unspecified lack of coordination        Past Surgical History:   Procedure Laterality Date    AV FISTULA PLACEMENT      FISTULOGRAM Left 8/20/2023    Procedure: Fistulogram;  Surgeon: Duong Aceves MD;  Location: [facility] OR [address] [place] [address];  Service: Vascular;  Laterality: Left;  1.6 MIN  55.57 mGy  10.4137 Gycm2  24 ml dye  4 ml transradial flush    PHLEBOGRAPHY  8/20/2023    Procedure: VENOGRAM, CENTRAL;  Surgeon: Duong Aceves MD;  Location: [facility] OR [address] [place] [address];  Service: Vascular;;       Review of patient's allergies indicates:  No Known Allergies    Family History       Problem Relation (Age of Onset)    Diabetes Mother          Tobacco Use    Smoking status: Never    Smokeless tobacco: Never   Substance and Sexual Activity    Alcohol use: Not Currently    Drug use: Not Currently    Sexual activity: Not Currently      Review of Systems   Reason unable to perform ROS: mental status change.     Objective:     Vital Signs (Most Recent):  Temp: 97.2 °F (36.2 °C) (10/11/23 0305)  Pulse: 70 (10/11/23 0225)  Resp: 16 (10/11/23 0225)  BP: (!) 143/74 (10/11/23 0215)  SpO2: 100 % (10/11/23 0225) Vital Signs (24h Range):  Temp:  [97.2 °F (36.2 °C)-98.8 °F (37.1 °C)] 97.2 °F (36.2 °C)  Pulse:  [69-77] 70  Resp:  [16-26] 16  SpO2:  [95 %-100 %] 100 %  BP: (130-185)/() 143/74   Weight: 70 kg (154 lb 5.2 oz)  Body mass index is 22.79 kg/m².    No intake or output data in the 24 hours ending 10/11/23 0305       Physical Exam  Vitals and nursing note reviewed.   Constitutional:       Appearance: He is ill-appearing.   HENT:      Head: Normocephalic and atraumatic.      Mouth/Throat:      Mouth: Mucous membranes are dry.   Eyes:      General:         Right eye: No discharge.         Left eye: No  discharge.      Comments: Bilateral periorbital edema   Cardiovascular:      Rate and Rhythm: Normal rate and regular rhythm.      Heart sounds: No murmur heard.  Pulmonary:      Effort: Respiratory distress present.      Breath sounds: Wheezing present. No rales.   Abdominal:      General: Bowel sounds are normal. There is distension.      Tenderness: There is no abdominal tenderness. There is no guarding.   Musculoskeletal:      Cervical back: Normal range of motion. No rigidity.      Right lower leg: No edema.      Left lower leg: No edema.   Skin:     General: Skin is warm and dry.      Comments: AV fistula left arm, palpable thrill   Neurological:      Comments: Lethargic            Vents:  Oxygen Concentration (%): 30 (10/11/23 0215)  Lines/Drains/Airways       Peripheral Intravenous Line  Duration                  Hemodialysis AV Fistula Left upper arm -- days         Peripheral IV - Single Lumen 10/11/23 0210 20 G Anterior;Right Saphenous <1 day         Peripheral IV - Single Lumen 10/11/23 0239 20 G Anterior;Right Upper Arm <1 day                  Significant Labs:    CBC/Anemia Profile:  Recent Labs   Lab 10/11/23  0055 10/11/23  0104   WBC  --  3.50*   HGB  --  9.5*   HCT 32* 29.5*   PLT  --  168   MCV  --  94   RDW  --  16.6*        Chemistries:  Recent Labs   Lab 10/11/23  0104   *   K 4.2   CL 98   CO2 20*   BUN 38*   CREATININE 3.8*   CALCIUM 8.0*   ALBUMIN 3.1*   PROT 6.6   BILITOT 0.8   ALKPHOS 239*   ALT 69*   AST 91*       All pertinent labs within the past 24 hours have been reviewed.    Significant Imaging: I have reviewed all pertinent imaging results/findings within the past 24 hours.    Assessment/Plan:     Pulmonary  COPD (chronic obstructive pulmonary disease)  -- No PFTs on file   -- Home Albuterol, Fluticasol-Salmeterol, and Spiriva   -- Wean FiO2 for Spo2 > 88%    Chronic hypoxemic respiratory failure  Patient with Hypercapnic and Hypoxic Respiratory failure which is Chronic.  he  is not on home oxygen. Supplemental oxygen was provided and noted- Oxygen Concentration (%):  [30-50] 30    Signs/symptoms of respiratory failure include- tachypnea, increased work of breathing and use of accessory muscles. Contributing diagnoses includes - CHF, COPD and fluid volume excess Labs and images were reviewed. Patient Has recent ABG, which has been reviewed. Will treat underlying causes and adjust management of respiratory failure as follows-     58 year old male with multiple medical problems who presented to [facility] ED via EMS for AMS, hypoglycemia, and hypoxia placed on NIPPV. Per chart review patient with room air saturation in 50s which improved with HFNC, however, he remained tachypnic with accessory muscle use, therefore he was placed on NIPPV. Unclear if patient requires oxygen at baseline. Previous hospitalizations with oxygen use. CXR with bilateral opacifications R > L and BNP elevated > 4900.     -- Continue NIPPV   -- Repeat VBG   -- Wean FiO2 for SpO2 > 88%   -- Albuterol PRN wheezing   -- CAP coverage   -- Sputum culture   -- Follow up COVID / RSV / Flu Swab   -- Volume removal per iHD    Cardiac/Vascular  HLD (hyperlipidemia)  -- Hold statin in the setting of elevated LFTs   -- CMP daily. Will resume Atorvastatin when LFTs improve     Chronic diastolic heart failure  ECHO 09/23:       Left Ventricle: The left ventricle is normal in size. Increased wall thickness. There is concentric hypertrophy. Mild global hypokinesis present. There is mildly reduced systolic function with a visually estimated ejection fraction of 40 - 45%. There is indeterminate diastolic function.    Right Ventricle: Normal right ventricular cavity size. Right ventricle wall motion  is normal. Systolic function is borderline low.    Left Atrium: Left atrium is severely dilated.    Tricuspid Valve: There is mild regurgitation.    Pericardium: There is a trivial effusion. Echodensity seen adjacent to the visceral  pericardium of uncertain significance. This may be pericardial fat although other etiologies not excluded. Correlate clinically.    PASP is at least 46 mmHg.    -- BNP > 4900. Volume removal per iHD. Appreciate nephrology assistance   -- GDMT as tolerated. Home meds list Imdur, Lisinopril, Nifedipine.   -- Fluid restrict to 1.5 L x 24 hours     Renal/  ESRD (end stage renal disease)  -Outpatient HD unit: [facility]   -HD tx days: MWF   -HD tx time: 4hrs   -HD access: LUE AVF   -HD modality: iHD   -Residual urine: Anuric     -- Nephrology consulted for iHD management. Appreciate their assistance   -- CMP daily and trend   -- Avoid nephrotoxins   -- Renally dose all medications to appropriate GFR / CrCl   -- Estimated Creatinine Clearance: 20.5 mL/min (A) (based on SCr of 3.8 mg/dL (H)).  -- Hgb > 7 and MAP > 65 goals   -- Renal diet when tolerating PO   -- Continue renvela when able to take PO     Hematology  History of pulmonary embolism  Diagnosed 10/2022  Repeat CT 3/2023 with resolution    -hold home eliquis for mental status change  -VTE ppx pending CTH results    Oncology  Anemia in ESRD (end-stage renal disease)  Recent Labs   Lab 10/11/23  0104   WBC 3.50*   RBC 3.15*   HGB 9.5*   HCT 29.5*      MCV 94   MCH 30.2   MCHC 32.2     -- Type and screen   -- CBC daily and trend   -- Transfuse for hgb < 7   -- Receives EPO with iHD    Endocrine  Hyperglycemia due to type 2 diabetes mellitus  -- Last A1C July 2023 10.7  -- Repeat A1C   -- See plan for hypoglycemia     Hypoglycemia  Hypoglycemic to 30s at nursing home and given dextrose. Persist[place]G in [place] [facility] and was started on a continuous dextrose infusion    -- Q2H accu checks   -- Continue dextrose infusion   -- Hypoglycemia protocol   -- Follow up insulin level         Critical Care Daily Checklist:    A: Awake: RASS Goal/Actual Goal:    Actual:     B: Spontaneous Breathing Trial Performed?     C: SAT & SBT Coordinated?  N?A                       D: Delirium: CAM-ICU     E: Early Mobility Performed? No   F: Feeding Goal:    Status:     Current Diet Order   Procedures    Diet NPO      AS: Analgesia/Sedation    T: Thromboembolic Prophylaxis Yes   H: HOB > 300 Yes   U: Stress Ulcer Prophylaxis (if needed) N/A   G: Glucose Control Hypoglycemia protocol in place   B: Bowel Function     I: Indwelling Catheter (Lines & Dutta) Necessity PIV   D: De-escalation of Antimicrobials/Pharmacotherapies Continue BSA    Plan for the day/ETD Admit to ICU for management of respiratory failure    Code Status:  Family/Goals of Care: Full Code       Critical Care Time: 60 minutes    Plan of care discussed with Dr. Juarez. To be discussed with Dr. Mendez and attestation to follow.    Critical secondary to Patient has a condition that poses threat to life and bodily function: Severe Respiratory Distress     Critical care was time spent personally by me on the following activities: development of treatment plan with patient or surrogate and bedside caregivers, discussions with consultants, evaluation of patient's response to treatment, examination of patient, ordering and performing treatments and interventions, ordering and review of laboratory studies, ordering and review of radiographic studies, pulse oximetry, re-evaluation of patient's condition. This critical care time did not overlap with that of any other provider or involve time for any procedures.     Hal Glynn, NP  Critical Care Medicine  [facility] - Cardiac Medical ICU

## 2023-10-11 NOTE — ASSESSMENT & PLAN NOTE
Hypoglycemic to 30s at nursing home and given dextrose. Persist[place] [facility] and was started on a continuous dextrose infusion    -- Insulin level elevated, C-peptide level normal   -- Q1H accu checks   -- Continue D20 infusion   -- Hypoglycemia protocol

## 2023-10-11 NOTE — ASSESSMENT & PLAN NOTE
-- No PFTs on file   -- Home Albuterol, Fluticasol-Salmeterol, and Spiriva   -- Wean FiO2 for Spo2 > 88%

## 2023-10-11 NOTE — SUBJECTIVE & OBJECTIVE
Past Medical History:   Diagnosis Date    CHF (congestive heart failure)     Chronic kidney disease-mineral and bone disorder 10/12/2022    Chronic respiratory failure     COPD (chronic obstructive pulmonary disease)     Diabetes mellitus type 1     ESRD on dialysis     Hypertension     Hypervolemia 02/22/2023    Hyponatremia 03/04/2023    Other insomnia     Recurrent major depression     SOB (shortness of breath) 10/12/2022    Patient with history COPD treated with Ellipta the albuterol, fluticasone-salmeterol tachypneic in the ED saturating 94% and 6 L on nasal cannula, patient does not know how many  he uses it is in nursing home.    -duo nebs Q 4  Given that patient is a poor historian, and in the setting of left lower extremity edema and history of coagulopathy PE cannot be ruled out.  Will workup for possible infec    Unspecified lack of coordination        Past Surgical History:   Procedure Laterality Date    AV FISTULA PLACEMENT      FISTULOGRAM Left 8/20/2023    Procedure: Fistulogram;  Surgeon: Duong Aceves MD;  Location: [facility] OR [address] [place] [address];  Service: Vascular;  Laterality: Left;  1.6 MIN  55.57 mGy  10.4137 Gycm2  24 ml dye  4 ml transradial flush    PHLEBOGRAPHY  8/20/2023    Procedure: VENOGRAM, CENTRAL;  Surgeon: Duong Aceves MD;  Location: [facility] OR [address] [place] [address];  Service: Vascular;;       Review of patient's allergies indicates:  No Known Allergies    Family History       Problem Relation (Age of Onset)    Diabetes Mother          Tobacco Use    Smoking status: Never    Smokeless tobacco: Never   Substance and Sexual Activity    Alcohol use: Not Currently    Drug use: Not Currently    Sexual activity: Not Currently      Review of Systems   Reason unable to perform ROS: mental status change.     Objective:     Vital Signs (Most Recent):  Temp: 97.2 °F (36.2 °C) (10/11/23 0305)  Pulse: 70 (10/11/23 0225)  Resp: 16 (10/11/23 0225)  BP: (!) 143/74 (10/11/23 0215)  SpO2: 100 % (10/11/23 0225)  Vital Signs (24h Range):  Temp:  [97.2 °F (36.2 °C)-98.8 °F (37.1 °C)] 97.2 °F (36.2 °C)  Pulse:  [69-77] 70  Resp:  [16-26] 16  SpO2:  [95 %-100 %] 100 %  BP: (130-185)/() 143/74   Weight: 70 kg (154 lb 5.2 oz)  Body mass index is 22.79 kg/m².    No intake or output data in the 24 hours ending 10/11/23 0305       Physical Exam  Vitals and nursing note reviewed.   Constitutional:       Appearance: He is ill-appearing.   HENT:      Head: Normocephalic and atraumatic.      Mouth/Throat:      Mouth: Mucous membranes are dry.   Eyes:      General:         Right eye: No discharge.         Left eye: No discharge.      Comments: Bilateral periorbital edema   Cardiovascular:      Rate and Rhythm: Normal rate and regular rhythm.      Heart sounds: No murmur heard.  Pulmonary:      Effort: Respiratory distress present.      Breath sounds: Wheezing present. No rales.   Abdominal:      General: Bowel sounds are normal. There is distension.      Tenderness: There is no abdominal tenderness. There is no guarding.   Musculoskeletal:      Cervical back: Normal range of motion. No rigidity.      Right lower leg: No edema.      Left lower leg: No edema.   Skin:     General: Skin is warm and dry.      Comments: AV fistula left arm, palpable thrill   Neurological:      Comments: Lethargic            Vents:  Oxygen Concentration (%): 30 (10/11/23 0215)  Lines/Drains/Airways       Peripheral Intravenous Line  Duration                  Hemodialysis AV Fistula Left upper arm -- days         Peripheral IV - Single Lumen 10/11/23 0210 20 G Anterior;Right Saphenous <1 day         Peripheral IV - Single Lumen 10/11/23 0239 20 G Anterior;Right Upper Arm <1 day                  Significant Labs:    CBC/Anemia Profile:  Recent Labs   Lab 10/11/23  0055 10/11/23  0104   WBC  --  3.50*   HGB  --  9.5*   HCT 32* 29.5*   PLT  --  168   MCV  --  94   RDW  --  16.6*        Chemistries:  Recent Labs   Lab 10/11/23  0104   *   K 4.2   CL 98    CO2 20*   BUN 38*   CREATININE 3.8*   CALCIUM 8.0*   ALBUMIN 3.1*   PROT 6.6   BILITOT 0.8   ALKPHOS 239*   ALT 69*   AST 91*       All pertinent labs within the past 24 hours have been reviewed.    Significant Imaging: I have reviewed all pertinent imaging results/findings within the past 24 hours.

## 2023-10-11 NOTE — ASSESSMENT & PLAN NOTE
Diagnosed 10/2022  Repeat CT 3/2023 with resolution    -hold home eliquis for mental status change  -VTE ppx with heparin

## 2023-10-11 NOTE — ASSESSMENT & PLAN NOTE
Patient with Hypercapnic and Hypoxic Respiratory failure which is Chronic.  he is not on home oxygen. Supplemental oxygen was provided and noted- Oxygen Concentration (%):  [30-50] 30    Signs/symptoms of respiratory failure include- tachypnea, increased work of breathing and use of accessory muscles. Contributing diagnoses includes - CHF, COPD and fluid volume excess Labs and images were reviewed. Patient Has recent ABG, which has been reviewed. Will treat underlying causes and adjust management of respiratory failure as follows-     58 year old male with multiple medical problems who presented to [facility] ED via EMS for AMS, hypoglycemia, and hypoxia placed on NIPPV. Per chart review patient with room air saturation in 50s which improved with HFNC, however, he remained tachypnic with accessory muscle use, therefore he was placed on NIPPV. Unclear if patient requires oxygen at baseline. Previous hospitalizations with oxygen use. CXR with bilateral opacifications R > L and BNP elevated > 4900.     -- Continue NIPPV   -- Repeat VBG   -- Wean FiO2 for SpO2 > 88%   -- Albuterol PRN wheezing   -- CAP coverage   -- Sputum culture   -- Follow up COVID / RSV / Flu Swab   -- Volume removal per iHD

## 2023-10-11 NOTE — ED PROVIDER NOTES
Encounter Date: 10/11/2023       History     Chief Complaint   Patient presents with    Hypoglycemia     Pt from [facility], initial CBG 38, given 25g D50 and now CBG 99.     Shortness of Breath     Initial SpO2 58% with snoring respirations. Arrives on non rebreather 15 L, SpO2 95%. Initially unresponsive to sternal rub, GCS 11. NPA in place.      58 y.o. male with COPD, CHF, ESRD on dialysis, HTN, DM presents via EMS from nursing home for altered mental status and hypoxia.  Per EMS, patient was found to have glucose of 38, he received D50 with improvement in his glucose.  He also had SpO2 58% which improved with 15 L nasal cannula.  History limited due to patient mental status    The history is provided by the EMS personnel and medical records. The history is limited by the condition of the patient and the absence of a caregiver.     Review of patient's allergies indicates:  No Known Allergies  Past Medical History:   Diagnosis Date    CHF (congestive heart failure)     Chronic kidney disease-mineral and bone disorder 10/12/2022    Chronic respiratory failure     COPD (chronic obstructive pulmonary disease)     Diabetes mellitus type 1     ESRD on dialysis     Hypertension     Hypervolemia 02/22/2023    Hyponatremia 03/04/2023    Other insomnia     Recurrent major depression     SOB (shortness of breath) 10/12/2022    Patient with history COPD treated with Ellipta the albuterol, fluticasone-salmeterol tachypneic in the ED saturating 94% and 6 L on nasal cannula, patient does not know how many  he uses it is in nursing home.    -duo nebs Q 4  Given that patient is a poor historian, and in the setting of left lower extremity edema and history of coagulopathy PE cannot be ruled out.  Will workup for possible infec    Unspecified lack of coordination      Past Surgical History:   Procedure Laterality Date    AV FISTULA PLACEMENT      FISTULOGRAM Left 8/20/2023    Procedure: Fistulogram;  Surgeon:  Duong Aceves MD;  Location: [facility] OR [address] [place] [address];  Service: Vascular;  Laterality: Left;  1.6 MIN  55.57 mGy  10.4137 Gycm2  24 ml dye  4 ml transradial flush    PHLEBOGRAPHY  8/20/2023    Procedure: VENOGRAM, CENTRAL;  Surgeon: Duong Aceves MD;  Location: [facility] OR [address] [place] [address];  Service: Vascular;;     Family History   Problem Relation Age of Onset    Diabetes Mother      Social History     Tobacco Use    Smoking status: Never    Smokeless tobacco: Never   Substance Use Topics    Alcohol use: Not Currently    Drug use: Not Currently     Review of Systems   Unable to perform ROS: Mental status change       Physical Exam     Initial Vitals [10/11/23 0030]   BP Pulse Resp Temp SpO2   130/70 77 16 98.8 °F (37.1 °C) 95 %      MAP       --         Physical Exam    Nursing note and vitals reviewed.  Constitutional:   Drowsy but arousable, ill-appearing, tachypneic   Eyes: Conjunctivae are normal. No scleral icterus.   Chemosis of the right eye   Cardiovascular:  Normal rate, regular rhythm and intact distal pulses.           Pulmonary/Chest:   Tachypneic, rhonchi more prominent on the right   Abdominal: Abdomen is soft. He exhibits distension. There is no abdominal tenderness.   Musculoskeletal:         General: No tenderness or edema.     Neurological: He is alert.   Drowsy but alert, follows commands, nonverbal   Skin: Skin is warm and dry.         ED Course   Critical Care    Date/Time: 10/11/2023 12:27 AM    Performed by: Dereck Vergara MD  Authorized by: Dereck Vergara MD  Direct patient critical care time: 10 minutes  Additional history critical care time: 5 minutes  Ordering / reviewing critical care time: 5 minutes  Documentation critical care time: 5 minutes  Consult with family critical care time: 5 minutes  Other critical care time: 5 minutes  Total critical care time (exclusive of procedural time) : 35 minutes  Critical care time was exclusive of separately billable procedures and treating other  patients.  Critical care was necessary to treat or prevent imminent or life-threatening deterioration of the following conditions: respiratory failure.  Critical care was time spent personally by me on the following activities: development of treatment plan with patient or surrogate, obtaining history from patient or surrogate, ordering and performing treatments and interventions, ordering and review of laboratory studies, evaluation of patient's response to treatment, examination of patient, re-evaluation of patient's condition, review of old charts and pulse oximetry.        Labs Reviewed   CBC W/ AUTO DIFFERENTIAL - Abnormal; Notable for the following components:       Result Value    WBC 3.50 (*)     RBC 3.15 (*)     Hemoglobin 9.5 (*)     Hematocrit 29.5 (*)     RDW 16.6 (*)     Lymph # 0.5 (*)     nRBC 3 (*)     Lymph % 14.0 (*)     Mono % 17.7 (*)     All other components within normal limits   COMPREHENSIVE METABOLIC PANEL - Abnormal; Notable for the following components:    Sodium 132 (*)     CO2 20 (*)     Glucose 51 (*)     BUN 38 (*)     Creatinine 3.8 (*)     Calcium 8.0 (*)     Albumin 3.1 (*)     Alkaline Phosphatase 239 (*)     AST 91 (*)     ALT 69 (*)     eGFR 17.4 (*)     All other components within normal limits   TROPONIN I - Abnormal; Notable for the following components:    Troponin I 0.174 (*)     All other components within normal limits   B-TYPE NATRIURETIC PEPTIDE - Abnormal; Notable for the following components:    BNP >4,900 (*)     All other components within normal limits   CBC W/ AUTO DIFFERENTIAL - Abnormal; Notable for the following components:    WBC 2.81 (*)     RBC 2.80 (*)     Hemoglobin 8.2 (*)     Hematocrit 26.3 (*)     MCHC 31.2 (*)     RDW 16.3 (*)     Platelets 145 (*)     Lymph # 0.3 (*)     nRBC 3 (*)     Gran % 78.2 (*)     Lymph % 11.4 (*)     All other components within normal limits   SARS-COV2 (COVID) WITH FLU/RSV BY PCR - Abnormal; Notable for the following  components:    SARS-CoV2 (COVID-19) Qualitative PCR Detected (*)     All other components within normal limits   ISTAT PROCEDURE - Abnormal; Notable for the following components:    POC PH 7.271 (*)     POC PCO2 61.2 (*)     POC HCO3 28.2 (*)     POC Sodium 134 (*)     POC TCO2 30 (*)     POC Hematocrit 32 (*)     All other components within normal limits   POCT GLUCOSE - Abnormal; Notable for the following components:    POCT Glucose 59 (*)     All other components within normal limits   POCT GLUCOSE - Abnormal; Notable for the following components:    POCT Glucose 34 (*)     All other components within normal limits   ISTAT PROCEDURE - Abnormal; Notable for the following components:    POC PH 7.292 (*)     POC PCO2 55.3 (*)     POC PO2 33 (*)     All other components within normal limits   POCT GLUCOSE - Abnormal; Notable for the following components:    POCT Glucose 29 (*)     All other components within normal limits   POCT GLUCOSE - Abnormal; Notable for the following components:    POCT Glucose 430 (*)     All other components within normal limits   POCT GLUCOSE - Abnormal; Notable for the following components:    POCT Glucose 124 (*)     All other components within normal limits   INSULIN, RANDOM   ISTAT LACTATE   POCT GLUCOSE   POCT GLUCOSE   POCT GLUCOSE   POCT GLUCOSE   POCT GLUCOSE MONITORING CONTINUOUS   POCT GLUCOSE MONITORING CONTINUOUS     EKG Readings: (Independently Interpreted)   Baseline artifact, appears to be sinus rhythm, regular, narrow complex, rate of 73, no STEMI, interpretation limited due to artifact       Imaging Results              CT Head Without Contrast (Final result)  Result time 10/11/23 05:09:26      Final result by Aguilar Garcia MD (10/11/23 05:09:26)                   Impression:      No evidence of acute intracranial pathology. If the patient has an acute, focal neurological deficit, MRI of the brain may be indicated.    Generalized cerebral volume loss and chronic  ischemic changes.    Paranasal sinus disease, mildly improved when compared to prior study.    Bilateral mastoid effusions, mildly worse when compared to prior study.    Electronically signed by resident: Yamilet Collins  Date:    10/11/2023  Time:    04:48    Electronically signed by: Aguilar Garcia MD  Date:    10/11/2023  Time:    05:09               Narrative:    EXAMINATION:  CT HEAD WITHOUT CONTRAST    CLINICAL HISTORY:  Mental status change, unknown cause;    TECHNIQUE:  Low dose axial CT images obtained throughout the head without the use of intravenous contrast.  Axial, sagittal and coronal reconstructions were performed.    COMPARISON:  CT head 07/18/2023.    FINDINGS:  Intracranial compartment:    Ventricles and sulci are stable in size without evidence of hydrocephalus.    The brain parenchyma appears similar to prior.  There are areas of hypoattenuation in the supratentorial white matter, nonspecific but most likely reflecting chronic microvascular ischemic changes.  Remote appearing lacunar infarct in the right basal ganglia.  Minimal encephalomalacia in the paramedian superior frontal lobes.  No parenchymal mass, hemorrhage, edema or major vascular distribution infarct.    No extra-axial blood or fluid collections.    Skull/extracranial contents (limited evaluation):    No displaced calvarial fracture.  Nonspecific scalp soft tissue prominence as seen on prior studies, potentially related to generalized edema.    Mild mucosal thickening of the right maxillary sinus, sphenoid sinuses, and ethmoid air cells.  Paranasal sinus disease has improved when compared with the prior exam.  Partial opacification of the mastoid air cells bilaterally, similar but mildly worse compared to prior.                                       X-Ray Chest AP Portable (Final result)  Result time 10/11/23 01:41:02      Final result by Hal Duenas MD (10/11/23 01:41:02)                   Impression:       Persistent/recurrent infiltrates in the lungs right greater than left.  Correlate for CHF.  Pneumonia with parapneumonic effusion difficult to exclude radiographically on the right.      Electronically signed by: Hal Duenas  Date:    10/11/2023  Time:    01:41               Narrative:    EXAMINATION:  XR CHEST AP PORTABLE    CLINICAL HISTORY:  CHF;    TECHNIQUE:  Single frontal view of the chest was performed.    COMPARISON:  09/12/2023    FINDINGS:  Cardiac silhouette remains enlarged with left ventricular configuration.  Bilateral infiltrates remain with fluid type density seen in the right pleura and right minor fissure.                                       Medications   sodium chloride 0.9% flush 10 mL (has no administration in time range)   acetaminophen tablet 650 mg (has no administration in time range)   fluticasone furoate-vilanteroL 100-25 mcg/dose diskus inhaler 1 puff (has no administration in time range)   sevelamer carbonate tablet 800 mg (has no administration in time range)   vitamin renal formula (B-complex-vitamin c-folic acid) 1 mg per capsule 1 capsule (has no administration in time range)   glucagon (human recombinant) injection 1 mg (has no administration in time range)   dextrose 10% bolus 125 mL 125 mL (has no administration in time range)   dextrose 10% bolus 250 mL 250 mL (has no administration in time range)   cefTRIAXone (ROCEPHIN) 2 g in dextrose 5 % in water (D5W) 100 mL IVPB (MB+) (has no administration in time range)   doxycycline (VIBRAMYCIN) 100 mg in dextrose 5 % in water (D5W) 100 mL IVPB (MB+) (has no administration in time range)   dextrose 10 % infusion ( Intravenous Rate/Dose Change 10/11/23 3210)   isosorbide mononitrate 24 hr tablet 30 mg (has no administration in time range)   NIFEdipine 24 hr tablet 60 mg (has no administration in time range)   lisinopriL tablet 40 mg (has no administration in time range)   carvediloL tablet 12.5 mg (has no administration in time  range)   remdesivir 200 mg in sodium chloride 0.9% 250 mL infusion (has no administration in time range)   remdesivir 100 mg in sodium chloride 0.9% 250 mL infusion (has no administration in time range)   glucose chewable tablet 16 g (has no administration in time range)   glucose chewable tablet 24 g (has no administration in time range)   albuterol inhaler 2 puff (has no administration in time range)   ascorbic acid (vitamin C) tablet 500 mg (has no administration in time range)   multivitamin tablet (has no administration in time range)   dexAMETHasone tablet 6 mg (has no administration in time range)   dexmedetomidine (PRECEDEX) 400mcg/100mL 0.9% NaCL infusion (0.2 mcg/kg/hr × 70 kg Intravenous New Bag 10/11/23 0538)   magnesium sulfate 2g in water 50mL IVPB (premix) (has no administration in time range)   dexmedeTOMIDine in 0.9 % NaCL 400 mcg/100 mL (4 mcg/mL) infusion (has no administration in time range)   haloperidol lactate (HALDOL) 5 mg/mL injection (has no administration in time range)   dextrose 50 % in water (D50W) injection 25 g (25 g Intravenous Given 10/11/23 0223)   cefTRIAXone (ROCEPHIN) 1 g in dextrose 5 % in water (D5W) 100 mL IVPB (MB+) (0 g Intravenous Stopped 10/11/23 0250)   doxycycline (VIBRAMYCIN) 100 mg in dextrose 5 % in water (D5W) 100 mL IVPB (MB+) (0 mg Intravenous Stopped 10/11/23 0355)   albuterol-ipratropium 2.5 mg-0.5 mg/3 mL nebulizer solution 3 mL (3 mLs Nebulization Given 10/11/23 0225)   droPERidol injection 1.25 mg (1.25 mg Intramuscular Given 10/11/23 0612)     Medical Decision Making  58 y.o. male with COPD, CHF, ESRD on dialysis, HTN, DM presents via EMS from nursing home for altered mental status and hypoxia  Differentials include metabolic derangement hypoxia, aspiration, heart failure exacerbation, COPD exacerbation, pneumonia  On arrival, patient ill-appearing, tachypneic, with rhonchi in the right.  Patient placed on BiPAP with improvement, his glucose dropped again  despite D50, was given additional D10 boluses and started on D5 drip  Patient without fever or significant tachycardia  Patient covered empirically for possible chest x-ray    Amount and/or Complexity of Data Reviewed  Independent Historian: EMS  External Data Reviewed: labs and notes.  Labs: ordered. Decision-making details documented in ED Course.  Radiology: ordered. Decision-making details documented in ED Course.    Risk  Prescription drug management.  Decision regarding hospitalization.               ED Course as of 10/11/23 0616   Wed Oct 11, 2023   0148 Troponin I(!): 0.174  Elevated compared to prior [OK]   0148 X-Ray Chest AP Portable  Findings, per independent interpretation:  Prominent cardiomediastinal silhouette, bilateral patchy opacifications consistent with pulmonary edema, possible consolidation right [OK]   0206 Discussed with critical care [OK]   0330 Patient admitted to critical care [OK]      ED Course User Index  [OK] Dereck Vergara MD                      Clinical Impression:   Final diagnoses:  [R06.02] Shortness of breath  [J96.01, J96.02] Acute respiratory failure with hypoxia and hypercapnia (Primary)  [E16.2] Hypoglycemia        ED Disposition Condition    Admit Stable                Dereck Vergara MD  10/11/23 0616       Dereck Vergara MD  10/28/23 2051

## 2023-10-11 NOTE — ASSESSMENT & PLAN NOTE
ECHO 09/23:       Left Ventricle: The left ventricle is normal in size. Increased wall thickness. There is concentric hypertrophy. Mild global hypokinesis present. There is mildly reduced systolic function with a visually estimated ejection fraction of 40 - 45%. There is indeterminate diastolic function.    Right Ventricle: Normal right ventricular cavity size. Right ventricle wall motion  is normal. Systolic function is borderline low.    Left Atrium: Left atrium is severely dilated.    Tricuspid Valve: There is mild regurgitation.    Pericardium: There is a trivial effusion. Echodensity seen adjacent to the visceral pericardium of uncertain significance. This may be pericardial fat although other etiologies not excluded. Correlate clinically.    PASP is at least 46 mmHg.    -- BNP > 4900. Volume removal per iHD. Appreciate nephrology assistance   -- Home BP meds discontinued as pressures have been within normal limits. Hydralazine push should pressures rise

## 2023-10-11 NOTE — ASSESSMENT & PLAN NOTE
-Outpatient HD unit: [facility]   -HD tx days: MWF   -HD tx time: 4hrs   -HD access: LUE AVF   -HD modality: iHD   -Residual urine: Anuric     -- Nephrology consulted for iHD management. Appreciate their assistance   -- CMP daily and trend   -- Avoid nephrotoxins   -- Renally dose all medications to appropriate GFR / CrCl   -- Estimated Creatinine Clearance: 20.5 mL/min (A) (based on SCr of 3.8 mg/dL (H)).  -- Hgb > 7 and MAP > 65 goals   -- Renal diet when tolerating PO   -- Continue renvela when able to take PO

## 2023-10-11 NOTE — ASSESSMENT & PLAN NOTE
-currently on D5W gtt  -recommend to switch to D10 gtt if able to limit high volume intake  -may need additional HD session tomorrow

## 2023-10-11 NOTE — ASSESSMENT & PLAN NOTE
Diagnosed 10/2022  Repeat CT 3/2023 with resolution    -hold home eliquis for mental status change  -VTE ppx pending CTH results

## 2023-10-11 NOTE — ASSESSMENT & PLAN NOTE
Recent Labs   Lab 10/11/23  0104   WBC 3.50*   RBC 3.15*   HGB 9.5*   HCT 29.5*      MCV 94   MCH 30.2   MCHC 32.2     -- Type and screen   -- CBC daily and trend   -- Transfuse for hgb < 7   -- Receives EPO with iHD

## 2023-10-11 NOTE — PLAN OF CARE
MICU DAILY GOALS     Family/Goals of care/Code Status   Code Status: Full Code    24H Vital Sign Range  Temp:  [94.2 °F (34.6 °C)-98.8 °F (37.1 °C)]   Pulse:  [62-92]   Resp:  [15-32]   BP: ()/()   SpO2:  [79 %-100 %]      Shift Events   Intubated and central line placed for D20 infusion    AWAKE RASS: Goal -    Actual - RASS (Osorio Agitation-Sedation Scale): light sedation    Restraint necessity: Treatment interference   BREATHE SBT: Not attempted    Coordinate A & B, analgesics/sedatives Pain: managed   SAT: Not attempted   Delirium CAM-ICU:     Early(intubated/ Progressive (non-intubated) Mobility MOVE Screen (INTUBATED ONLY): Pass    Activity: Activity Management: Patient unable to perform activities   Feeding/Nutrition Diet order: Diet/Nutrition Received: NPO,     Thrombus DVT prophylaxis:     HOB Elevation Head of Bed (HOB) Positioning: HOB at 30-45 degrees   Ulcer Prophylaxis GI: yes   Glucose control managed     Skin Skin assessed during: Daily Assessment    [] No Altered Skin Integrity Present    []Prevention Measures Documented      [] Yes- Altered Skin Integrity Present or Discovered   [] LDA Added if Not in Epic (Describe Wound)   [] New Altered Skin Integrity was Present on Admit and Documented in LDA   [] Wound Image Taken    Wound Care Consulted? No    Attending Nurse:  Sandy Lagunas RN/Staff Member:      Bowel Function constipation    Indwelling Catheter Necessity      Percutaneous Central Line Insertion/Assessment - Triple Lumen  10/11/23 1501 Internal Jugular Left-Line Necessity Review: Medication caustic to vasculature  yes   De-escalation Antibiotics No       VS and assessment per flow sheet, patient progressing towards goals as tolerated, plan of care reviewed withname , all concerns addressed, will continue to monitor.     X Size Of Lesion In Cm (Optional): 0 Detail Level: Generalized

## 2023-10-11 NOTE — HPI
Cosme Lewis is a 57 yo male with PMHx of ESRD on iHD MWF who presents from his NH with AMS, hypoxia, and hypoglycemia.  He was placed on BiPAP and transferred to ICU for higher level of care.  CXR with evidence of pulmonary edema, BNP elevated > 3800.  Labs on chemistry without emergent electrolyte abnormalities consistent with ESRD status.  CBC without leukocytosis.  He was found to be COVID + and started on Broad spectrum Abx and steroids. He required D5W gtt for persistent hypoglycemia.  Nephrology has been consulted for ESRD management while IP.

## 2023-10-11 NOTE — PROGRESS NOTES
Pharmacokinetic Initial Assessment: IV Vancomycin    Assessment/Plan:    - Initiate intravenous vancomycin with a loading dose of 1250 mg once, pulse dosing in the setting of ESRD.  - Desired empiric serum trough concentration is 15 to 20 mcg/mL  - Draw vancomycin random level with AM labs tomorrow.  Pharmacy to re-dose based on level and RRT plans.     Pharmacy will continue to follow and monitor vancomycin.      Please contact pharmacy at extension [phone] with any questions regarding this assessment.     Thank you for the consult,   Gabriela Melara, PharmD, BCCCP       Patient brief summary:  Cosme Lewis is a 58 y.o. male initiated on antimicrobial therapy with IV Vancomycin for treatment of suspected lower respiratory infection    Drug Allergies:   Review of patient's allergies indicates:  No Known Allergies    Actual Body Weight:   70 kg    Renal Function:   Estimated Creatinine Clearance: 21 mL/min (A) (based on SCr of 3.7 mg/dL (H)).,     Dialysis Method (if applicable):  intermittent HD    CBC (last 72 hours):  Recent Labs   Lab Result Units 10/11/23  0104 10/11/23  0409   WBC K/uL 3.50* 2.81*   Hemoglobin g/dL 9.5* 8.2*   Hematocrit % 29.5* 26.3*   Platelets K/uL 168 145*   Gran % % 67.7 78.2*   Lymph % % 14.0* 11.4*   Mono % % 17.7* 9.6   Eosinophil % % 0.0 0.0   Basophil % % 0.3 0.4   Differential Method  Automated Automated       Metabolic Panel (last 72 hours):  Recent Labs   Lab Result Units 10/11/23  0104 10/11/23  0409 10/11/23  0535   Sodium mmol/L 132* 131* 132*   Potassium mmol/L 4.2 3.6 3.9   Chloride mmol/L 98 99 96   CO2 mmol/L 20* 20* 22*   Glucose mg/dL 51* 47* 95   BUN mg/dL 38* 40* 41*   Creatinine mg/dL 3.8* 3.5* 3.7*   Albumin g/dL 3.1* 2.7*  --    Total Bilirubin mg/dL 0.8 0.8  --    Alkaline Phosphatase U/L 239* 212*  --    AST U/L 91* 75*  --    ALT U/L 69* 56*  --    Magnesium mg/dL  --  2.0  --    Phosphorus mg/dL  --  3.9  --        Drug levels (last 3 results):  No results for  "input(s): "VANCOMYCINRA", "VANCORANDOM", "VANCOMYCINPE", "VANCOPEAK", "VANCOMYCINTR", "VANCOTROUGH" in the last 72 hours.    Microbiologic Results:  Microbiology Results (last 7 days)       Procedure Component Value Units Date/Time    Blood culture [[id number]] Collected: 10/11/23 0942    Order Status: Sent Specimen: Blood from Peripheral, Upper Arm, Left Updated: 10/11/23 1001    Blood culture [[id number]]     Order Status: Sent Specimen: Blood             "

## 2023-10-11 NOTE — SUBJECTIVE & OBJECTIVE
Interval History/Significant Events: Intubation, and central line placement      Review of Systems   Reason unable to perform ROS: Unable to asscess as patient is intubated.     Objective:     Vital Signs (Most Recent):  Temp: 98.2 °F (36.8 °C) (10/11/23 1100)  Pulse: 63 (10/11/23 1700)  Resp: (!) 22 (10/11/23 1700)  BP: (!) 115/58 (10/11/23 1700)  SpO2: (!) 92 % (10/11/23 1700) Vital Signs (24h Range):  Temp:  [94.2 °F (34.6 °C)-98.8 °F (37.1 °C)] 98.2 °F (36.8 °C)  Pulse:  [62-92] 63  Resp:  [15-32] 22  SpO2:  [79 %-100 %] 92 %  BP: ()/() 115/58   Weight: 70 kg (154 lb 5.2 oz)  Body mass index is 22.79 kg/m².      Intake/Output Summary (Last 24 hours) at 10/11/2023 1808  Last data filed at 10/11/2023 1700  Gross per 24 hour   Intake 3353.92 ml   Output 3600 ml   Net -246.08 ml          Physical Exam  Vitals reviewed.   Constitutional:       Comments: Patient unresponsive and currently sedated    HENT:      Head: Normocephalic and atraumatic.      Nose: Nose normal.   Eyes:      Pupils: Pupils are equal, round, and reactive to light.   Cardiovascular:      Rate and Rhythm: Normal rate and regular rhythm.   Pulmonary:      Comments: Significant respiratory distress requiring mechanical ventilation. Wheezes and crackles are auscultated in all lung fields  Abdominal:      General: Abdomen is flat.      Palpations: Abdomen is soft.   Musculoskeletal:         General: No swelling or deformity.   Skin:     General: Skin is warm.      Coloration: Skin is not jaundiced.            Vents:  Vent Mode: A/C (10/11/23 1532)  Set Rate: 20 BPM (10/11/23 1532)  Vt Set: 400 mL (10/11/23 1532)  PEEP/CPAP: 5 cmH20 (10/11/23 1532)  Oxygen Concentration (%): 60 (10/11/23 1700)  Peak Airway Pressure: 24 cmH20 (10/11/23 1532)  Plateau Pressure: 0 cmH20 (10/11/23 1532)  Total Ve: 8.78 L/m (10/11/23 1532)  Negative Inspiratory Force (cm H2O): 0 (10/11/23 1532)  F/VT Ratio<105 (RSBI): (!) 48.78 (10/11/23  1532)  Lines/Drains/Airways       Central Venous Catheter Line  Duration             Percutaneous Central Line Insertion/Assessment - Triple Lumen  10/11/23 1501 Internal Jugular Left <1 day              Drain  Duration                  NG/OG Tube 10/11/23 1112 Gurley sump Right mouth <1 day              Airway  Duration                  Airway - Non-Surgical 10/11/23 1108 Endotracheal Tube <1 day              Peripheral Intravenous Line  Duration                  Hemodialysis AV Fistula Left upper arm -- days         Peripheral IV - Single Lumen 10/11/23 0210 20 G Anterior;Right Saphenous <1 day         Peripheral IV - Single Lumen 10/11/23 0239 20 G Anterior;Right Upper Arm <1 day                  Significant Labs:    CBC/Anemia Profile:  Recent Labs   Lab 10/11/23  0055 10/11/23  0104 10/11/23  0409   WBC  --  3.50* 2.81*   HGB  --  9.5* 8.2*   HCT 32* 29.5* 26.3*   PLT  --  168 145*   MCV  --  94 94   RDW  --  16.6* 16.3*        Chemistries:  Recent Labs   Lab 10/11/23  0104 10/11/23  0409 10/11/23  0535   * 131* 132*   K 4.2 3.6 3.9   CL 98 99 96   CO2 20* 20* 22*   BUN 38* 40* 41*   CREATININE 3.8* 3.5* 3.7*   CALCIUM 8.0* 7.5* 7.6*   ALBUMIN 3.1* 2.7*  --    PROT 6.6 5.7*  --    BILITOT 0.8 0.8  --    ALKPHOS 239* 212*  --    ALT 69* 56*  --    AST 91* 75*  --    MG  --  2.0  --    PHOS  --  3.9  --        All pertinent labs within the past 24 hours have been reviewed.    Significant Imaging:  I have reviewed all pertinent imaging results/findings within the past 24 hours.

## 2023-10-11 NOTE — SUBJECTIVE & OBJECTIVE
Past Medical History:   Diagnosis Date    CHF (congestive heart failure)     Chronic kidney disease-mineral and bone disorder 10/12/2022    Chronic respiratory failure     COPD (chronic obstructive pulmonary disease)     Diabetes mellitus type 1     ESRD on dialysis     Hypertension     Hypervolemia 02/22/2023    Hyponatremia 03/04/2023    Other insomnia     Recurrent major depression     SOB (shortness of breath) 10/12/2022    Patient with history COPD treated with Ellipta the albuterol, fluticasone-salmeterol tachypneic in the ED saturating 94% and 6 L on nasal cannula, patient does not know how many  he uses it is in nursing home.    -duo nebs Q 4  Given that patient is a poor historian, and in the setting of left lower extremity edema and history of coagulopathy PE cannot be ruled out.  Will workup for possible infec    Unspecified lack of coordination        Past Surgical History:   Procedure Laterality Date    AV FISTULA PLACEMENT      FISTULOGRAM Left 8/20/2023    Procedure: Fistulogram;  Surgeon: Duong Aceves MD;  Location: [facility] OR [address] [place] [address];  Service: Vascular;  Laterality: Left;  1.6 MIN  55.57 mGy  10.4137 Gycm2  24 ml dye  4 ml transradial flush    PHLEBOGRAPHY  8/20/2023    Procedure: VENOGRAM, CENTRAL;  Surgeon: Duong Aceves MD;  Location: [facility] OR [address] [place] [address];  Service: Vascular;;       Review of patient's allergies indicates:  No Known Allergies  Current Facility-Administered Medications   Medication Frequency    acetaminophen tablet 650 mg Q6H PRN    albuterol-ipratropium 2.5 mg-0.5 mg/3 mL nebulizer solution 3 mL Q6H    ascorbic acid (vitamin C) tablet 500 mg BID    dextrose 10 % infusion Continuous    dextrose 10% bolus 125 mL 125 mL PRN    dextrose 10% bolus 250 mL 250 mL PRN    dextrose 20 % infusion Continuous    doxycycline (VIBRAMYCIN) 100 mg in dextrose 5 % in water (D5W) 100 mL IVPB (MB+) Q12H    famotidine (PF) injection 20 mg Daily    fentaNYL 2500 mcg in 0.9% sodium chloride  250 mL infusion premix (titrating) Continuous    glucagon (human recombinant) injection 1 mg PRN    glucose chewable tablet 16 g PRN    glucose chewable tablet 24 g PRN    heparin (porcine) injection 5,000 Units Q12H    hydrocortisone sodium succinate injection 100 mg Q6H    multivitamin tablet Daily    phenylephrine HCl in 0.9% NaCl 1 mg/10 mL (100 mcg/mL) syringe     piperacillin-tazobactam (ZOSYN) 4.5 g in dextrose 5 % in water (D5W) 100 mL IVPB (MB+) Q12H    propofol (DIPRIVAN) 10 mg/mL infusion Continuous    [START ON 10/12/2023] remdesivir 100 mg in sodium chloride 0.9% 250 mL infusion Daily    sodium chloride 0.9% flush 10 mL PRN    vancomycin - pharmacy to dose pharmacy to manage frequency    vancomycin 1,250 mg in dextrose 5 % (D5W) 250 mL IVPB (Vial-Mate) Once     Family History       Problem Relation (Age of Onset)    Diabetes Mother          Tobacco Use    Smoking status: Never    Smokeless tobacco: Never   Substance and Sexual Activity    Alcohol use: Not Currently    Drug use: Not Currently    Sexual activity: Not Currently     Review of Systems   Unable to perform ROS: Acuity of condition     Objective:     Vital Signs (Most Recent):  Temp: 98.2 °F (36.8 °C) (10/11/23 1100)  Pulse: 68 (10/11/23 1230)  Resp: (!) 31 (10/11/23 1230)  BP: (!) 153/70 (10/11/23 1230)  SpO2: 99 % (10/11/23 1230) Vital Signs (24h Range):  Temp:  [94.2 °F (34.6 °C)-98.8 °F (37.1 °C)] 98.2 °F (36.8 °C)  Pulse:  [62-92] 68  Resp:  [15-32] 31  SpO2:  [79 %-100 %] 99 %  BP: (121-220)/() 153/70     Weight: 70 kg (154 lb 5.2 oz) (10/11/23 0030)  Body mass index is 22.79 kg/m².  Body surface area is 1.85 meters squared.    I/O last 3 completed shifts:  In: 571.1 [I.V.:335.8; IV Piggyback:235.3]  Out: -      Physical Exam  Vitals and nursing note reviewed.   Constitutional:       General: He is in acute distress.      Appearance: He is well-developed. He is ill-appearing.      Comments: Nicholas   HENT:      Head: Normocephalic and  atraumatic.      Nose: No congestion or rhinorrhea.   Eyes:      General: No scleral icterus.        Right eye: No discharge.         Left eye: No discharge.      Extraocular Movements: Extraocular movements intact.      Conjunctiva/sclera: Conjunctivae normal.   Neck:      Vascular: JVD present.   Cardiovascular:      Rate and Rhythm: Normal rate and regular rhythm.      Heart sounds: No murmur heard.     No friction rub. No gallop.      Comments: UMA AVF  Pulmonary:      Effort: Respiratory distress present.      Breath sounds: Rhonchi and rales present. No wheezing.   Abdominal:      General: There is no distension.      Palpations: Abdomen is soft.      Tenderness: There is no abdominal tenderness.   Musculoskeletal:         General: No swelling.      Cervical back: Normal range of motion.      Right lower leg: No edema.      Left lower leg: No edema.   Skin:     General: Skin is warm and dry.   Neurological:      Mental Status: He is lethargic.   Psychiatric:         Behavior: Behavior is uncooperative.          Significant Labs:  CBC:   Recent Labs   Lab 10/11/23  0409   WBC 2.81*   RBC 2.80*   HGB 8.2*   HCT 26.3*   *   MCV 94   MCH 29.3   MCHC 31.2*     CMP:   Recent Labs   Lab 10/11/23  0409 10/11/23  0535   GLU 47* 95   CALCIUM 7.5* 7.6*   ALBUMIN 2.7*  --    PROT 5.7*  --    * 132*   K 3.6 3.9   CO2 20* 22*   CL 99 96   BUN 40* 41*   CREATININE 3.5* 3.7*   ALKPHOS 212*  --    ALT 56*  --    AST 75*  --    BILITOT 0.8  --

## 2023-10-11 NOTE — HPI
Mr. Lewis is a 58 year old male with PMH notable for COPD, HFrEF (45% and DD), ESRD on dialysis, HTN, DM presents via EMS from nursing home for altered mental status and hypoxia.  Per EMS, patient was found to have glucose of 38, he received D50 with improvement in his glucose.  He also had SpO2 58% which improved with 15 L nasal cannula.     ED work up revealed VBG 7.27/61 and he was subsequently placed on bipap for work of breathing. He was persistently hypoglycemic with BG 59 --> 34 despite IV dextrose, therefore he was started on a continuous dextrose infusion. Other lab work up revealed WBC 3, stable, H//H 9.5/29, Na 143, CO2 20, Cr/BUN 3.8/38, elevated AST/ALT/alk phos, and troponin 0.174 with no ischemic changes on EKG. He was given IV rocephin and doxycyline. Chest XR with bilateral opacifications R > L.    Critical care consulted for acute hypoxemic respiratory failure and NIPPV

## 2023-10-11 NOTE — NURSING
3 hours HD tx completed. 3 L of fluid removed.  Patient tolerated well.    Blood returned. Lines flushed. Needles pulled. Manual pressure held until hemostasis was achieved.    Report given to primary nurse.

## 2023-10-11 NOTE — ASSESSMENT & PLAN NOTE
Hypoglycemic to 30s at nursing home and given dextrose. Persist[place] [facility] [place] was started on a continuous dextrose infusion    -- Q2H accu checks   -- Continue dextrose infusion   -- Hypoglycemia protocol   -- Follow up insulin level

## 2023-10-12 PROBLEM — E16.2 HYPOGLYCEMIA: Status: RESOLVED | Noted: 2023-01-01 | Resolved: 2023-01-01

## 2023-10-12 NOTE — ASSESSMENT & PLAN NOTE
-Outpatient HD unit: [facility]   -HD tx days: MWF   -HD tx time: 4hrs   -HD access: LUE AVF   -HD modality: iHD   -Residual urine: Anuric     -- Nephrology consulted for iHD management. Appreciate their assistance   -- CMP daily and trend   -- Avoid nephrotoxins   -- Renally dose all medications to appropriate GFR / CrCl   -- Estimated Creatinine Clearance: 27.8 mL/min (A) (based on SCr of 2.8 mg/dL (H)).  -- Hgb > 7 and MAP > 65 goals

## 2023-10-12 NOTE — SUBJECTIVE & OBJECTIVE
Interval History:   HD Completed yesterday, tolerated well with 3L net UF.  Overall net even for the past 24 hours.  He required intubation during HD yesterday for worsening respiratory distress.  Euglycemic this morning.  Not on pressor support  Electrolytes with Na of 129.    Review of patient's allergies indicates:  No Known Allergies  Current Facility-Administered Medications   Medication Frequency    acetaminophen tablet 650 mg Q6H PRN    albuterol-ipratropium 2.5 mg-0.5 mg/3 mL nebulizer solution 3 mL Q6H    ascorbic acid (vitamin C) tablet 500 mg BID    dexAMETHasone injection 6 mg Daily    dextrose 10% bolus 125 mL 125 mL PRN    dextrose 10% bolus 250 mL 250 mL PRN    doxycycline (VIBRAMYCIN) 100 mg in dextrose 5 % in water (D5W) 100 mL IVPB (MB+) Q12H    famotidine (PF) injection 20 mg Daily    fentaNYL 2500 mcg in 0.9% sodium chloride 250 mL infusion premix (titrating) Continuous    glucagon (human recombinant) injection 1 mg PRN    glucose chewable tablet 16 g PRN    glucose chewable tablet 24 g PRN    heparin (porcine) injection 5,000 Units Q12H    multivitamin tablet Daily    mupirocin 2 % ointment BID    piperacillin-tazobactam (ZOSYN) 4.5 g in dextrose 5 % in water (D5W) 100 mL IVPB (MB+) Q12H    propofol (DIPRIVAN) 10 mg/mL infusion Continuous    remdesivir 100 mg in sodium chloride 0.9% 250 mL infusion Daily    sodium chloride 0.9% flush 10 mL PRN    vancomycin - pharmacy to dose pharmacy to manage frequency       Objective:     Vital Signs (Most Recent):  Temp: 99.5 °F (37.5 °C) (10/12/23 0745)  Pulse: 64 (10/12/23 0904)  Resp: 20 (10/12/23 0830)  BP: (!) 118/55 (10/12/23 0830)  SpO2: 97 % (10/12/23 0830) Vital Signs (24h Range):  Temp:  [94.4 °F (34.7 °C)-99.5 °F (37.5 °C)] 99.5 °F (37.5 °C)  Pulse:  [55-92] 64  Resp:  [19-31] 20  SpO2:  [77 %-100 %] 97 %  BP: ()/(49-93) 118/55     Weight: 70 kg (154 lb 5.2 oz) (10/11/23 0030)  Body mass index is 22.79 kg/m².  Body surface area is 1.85  meters squared.    I/O last 3 completed shifts:  In: 4275.1 [I.V.:2626.6; IV Piggyback:1648.6]  Out: 3600 [Other:3600]     Physical Exam  Vitals and nursing note reviewed.   Constitutional:       General: He is in acute distress.      Appearance: He is well-developed. He is ill-appearing.      Interventions: He is sedated and intubated.   HENT:      Head: Normocephalic and atraumatic.      Nose: No congestion or rhinorrhea.   Eyes:      General: No scleral icterus.        Right eye: No discharge.         Left eye: No discharge.      Extraocular Movements: Extraocular movements intact.      Conjunctiva/sclera: Conjunctivae normal.   Neck:      Vascular: JVD present.   Cardiovascular:      Rate and Rhythm: Normal rate and regular rhythm.      Heart sounds: No murmur heard.     No friction rub. No gallop.      Comments: UMA GERARDO  Pulmonary:      Effort: Respiratory distress present. He is intubated.      Breath sounds: Rhonchi and rales present. No wheezing.   Abdominal:      General: There is no distension.      Palpations: Abdomen is soft.      Tenderness: There is no abdominal tenderness.   Musculoskeletal:         General: No swelling.      Cervical back: Normal range of motion.      Right lower leg: No edema.      Left lower leg: No edema.   Skin:     General: Skin is warm and dry.          Significant Labs:  CBC:   Recent Labs   Lab 10/12/23  0437   WBC 8.40   RBC 2.80*   HGB 8.4*   HCT 26.9*   *   MCV 96   MCH 30.0   MCHC 31.2*     CMP:   Recent Labs   Lab 10/12/23  0437      CALCIUM 7.1*   ALBUMIN 2.2*   PROT 5.0*   *   K 4.4   CO2 18*   CL 97   BUN 27*   CREATININE 2.8*   ALKPHOS 146*   ALT 41   AST 53*   BILITOT 0.7

## 2023-10-12 NOTE — ASSESSMENT & PLAN NOTE
Acute on chronic, contributions include kidney failure and previous oral rehydration with D10/D20 for glucose management. Resolving

## 2023-10-12 NOTE — ASSESSMENT & PLAN NOTE
Patient with Hypercapnic and Hypoxic Respiratory failure which is Chronic.  he is not on home oxygen. Supplemental oxygen was provided and noted- Vent Mode: A/C  Oxygen Concentration (%):  [] 40  Resp Rate Total:  [16 br/min-90 br/min] 20 br/min  Vt Set:  [380 mL-400 mL] 380 mL  PEEP/CPAP:  [5 cmH20-8 cmH20] 8 cmH20  Mean Airway Pressure:  [9.9 ywY19-15 cmH20] 12 cmH20    Signs/symptoms of respiratory failure include- tachypnea, increased work of breathing and use of accessory muscles. Contributing diagnoses includes - CHF, COPD and fluid volume excess Labs and images were reviewed. Patient Has recent ABG, which has been reviewed. Will treat underlying causes and adjust management of respiratory failure as follows-     58 year old male with multiple medical problems who [place] [facility] [place] for AMS, hypoglycemia, and hypoxia placed on NIPPV. Per chart review patient with room air saturation in 50s which improved with HFNC, however, he remained tachypnic with accessory muscle use, therefore he was placed on NIPPV. Unclear if patient requires oxygen at baseline. Previous hospitalizations with oxygen use. CXR with bilateral opacifications R > L and BNP elevated > 4900.     - Intubated and mechanically ventilated, starting to wean off  - Monitoring ABGs, right now reassuring   - Chest x-ray concerning for pneumonia vs ARDS. CAP coverage with Vancomycin, Zosyn and Doxycycline.  - Remdesivir for COVID. Hydrocortisone 100mg Q6 changed to dexamethasone 6 mg daily  - Received HD (10/11), Nephrology following and recommends HD again today

## 2023-10-12 NOTE — PROGRESS NOTES
Pharmacokinetic Assessment Follow Up: IV Vancomycin    Vancomycin Regimen Assessment/Plan:    - Vancomycin random AM level resulted as 19 mcg/mL, goal 15-20 mcg/mL.  - ESRD patient, continue pulse dosing.  Nephrology is planning for iHD today.  - Administer vancomycin 500 mg x 1 dose after HD today.  - A random level is ordered with AM labs tomorrow.  Pharmacy to dose based on level and RRT plans.     Drug levels (last 3 results):  Recent Labs   Lab Result Units 10/12/23  0437   Vancomycin, Random ug/mL 19.0       Pharmacy will continue to follow and monitor vancomycin.    Please contact pharmacy at extension [phone] for questions regarding this assessment.    Thank you for the consult,   Gabriela Melara, PharmD, BCCCP       Patient brief summary:  Cosme Leiws is a 58 y.o. male initiated on antimicrobial therapy with IV Vancomycin for treatment of lower respiratory infection    The patient's current regimen is pulse dosing.    Drug Allergies:   Review of patient's allergies indicates:  No Known Allergies    Actual Body Weight:   70 kg    Renal Function:   Estimated Creatinine Clearance: 27.8 mL/min (A) (based on SCr of 2.8 mg/dL (H)).,     Dialysis Method (if applicable):  N/A    CBC (last 72 hours):  Recent Labs   Lab Result Units 10/11/23  0104 10/11/23  0409 10/12/23  0437   WBC K/uL 3.50* 2.81* 8.40   Hemoglobin g/dL 9.5* 8.2* 8.4*   Hemoglobin A1C %  --   --  8.4*   Hematocrit % 29.5* 26.3* 26.9*   Platelets K/uL 168 145* 100*   Gran % % 67.7 78.2* 78.0*   Lymph % % 14.0* 11.4* 5.0*   Mono % % 17.7* 9.6 3.0*   Eosinophil % % 0.0 0.0 0.0   Basophil % % 0.3 0.4 0.0   Differential Method  Automated Automated Manual       Metabolic Panel (last 72 hours):  Recent Labs   Lab Result Units 10/11/23  0104 10/11/23  0409 10/11/23  0535 10/12/23  0437   Sodium mmol/L 132* 131* 132* 129*   Potassium mmol/L 4.2 3.6 3.9 4.4   Chloride mmol/L 98 99 96 97   CO2 mmol/L 20* 20* 22* 18*   Glucose mg/dL 51* 47* 95 107   BUN mg/dL  38* 40* 41* 27*   Creatinine mg/dL 3.8* 3.5* 3.7* 2.8*   Albumin g/dL 3.1* 2.7*  --  2.2*   Total Bilirubin mg/dL 0.8 0.8  --  0.7   Alkaline Phosphatase U/L 239* 212*  --  146*   AST U/L 91* 75*  --  53*   ALT U/L 69* 56*  --  41   Magnesium mg/dL  --  2.0  --  2.1   Phosphorus mg/dL  --  3.9  --  4.4       Vancomycin Administrations:  vancomycin given in the last 96 hours                     vancomycin 1,250 mg in dextrose 5 % (D5W) 250 mL IVPB (Vial-Mate) (mg) 1,250 mg New Bag 10/11/23 1614                    Microbiologic Results:  Microbiology Results (last 7 days)       Procedure Component Value Units Date/Time    Blood culture [[id number]] Collected: 10/11/23 1513    Order Status: Completed Specimen: Blood from Line, Jugular, Internal Left Updated: 10/12/23 0115     Blood Culture, Routine No Growth to date    Blood culture [[id number]] Collected: 10/11/23 0942    Order Status: Completed Specimen: Blood from Peripheral, Upper Arm, Left Updated: 10/11/23 1715     Blood Culture, Routine No Growth to date

## 2023-10-12 NOTE — CONSULTS
"  [facility] - Cardiac Medical ICU  Adult Nutrition  Consult Note    SUMMARY     Recommendations    1. Initiate TFs when able. Rec'd Novasource @ 35 mL/hr to provide 1680 kcals, 76 g of protein, 602 mL fluid.   2. RD to monitor & follow-up.    Goals: Meet % EEN, EPN by RD f/u date  Nutrition Goal Status: new  Communication of RD Recs: discussed on rounds    Assessment and Plan    Nutrition Problem:  Inadequate energy intake    Related to (etiology):   Inability to consume sufficient energy     Signs and Symptoms (as evidenced by):   NPO    Interventions(treatment strategy):  Collaboration of nutrition care w/ other providers  EN    Nutrition Diagnosis Status:   New    Reason for Assessment    Reason For Assessment: consult, new tube feeding  Diagnosis: other (see comments) (Resp. fx)  Relevant Medical History: HF, DM, ESRD on HD  Interdisciplinary Rounds: attended    General Information Comments: Pt presents from NH, intubated/sedated, +COVID-19; TFs to be initiated. Pt received HD yesterday. Per chart review, pt with no significant wt loss PTA (UBW: 160#). Pt appears nourished; NFPE not warranted.   Nutrition Discharge Planning: Pending clinical course    Nutrition/Diet History    Factors Affecting Nutritional Intake: on mechanical ventilation, NPO    Anthropometrics    Temp: 99.5 °F (37.5 °C)  Height Method: Stated  Height: 5' 9" (175.3 cm)  Height (inches): 69 in  Weight Method: Estimated  Weight: 70 kg (154 lb 5.2 oz)  Weight (lb): 154.32 lb  Ideal Body Weight (IBW), Male: 160 lb  % Ideal Body Weight, Male (lb): 96.45 %  BMI (Calculated): 22.8  BMI Grade: 18.5-24.9 - normal    Lab/Procedures/Meds    Pertinent Labs Reviewed: reviewed  Pertinent Labs Comments: Creat 2.8, GFR 25, A1C 10.7 (7/2023)  Pertinent Medications Reviewed: reviewed  Pertinent Medications Comments: Fentanyl, Propofol    Estimated/Assessed Needs    Weight Used For Calorie Calculations: 70 kg (154 lb 5.2 oz)    Energy Calorie Requirements " (kcal): 1731 kcal/d  Energy Need Method: [facility]    Protein Requirements:  g/d (1.2-1.5 g/kg)  Weight Used For Protein Calculations: 70 kg (154 lb 5.2 oz)    Estimated Fluid Requirement Method: other (see comments) (Per MD or 1 mL/kcal)  RDA Method (mL): 1731    CHO Requirement: 216g    Nutrition Prescription Ordered    Current Diet Order: NPO  Current Nutrition Support Formula Ordered: [facility]    Evaluation of Received Nutrient/Fluid Intake    Other Calories (kcal): 222 (Propofol)    I/O: +6.7L since admit    Comments: LBM: 10/11    Nutrition Risk    Level of Risk/Frequency of Follow-up:  (1x/week)     Monitor and Evaluation    Food and Nutrient Intake: enteral nutrition intake, food and beverage intake, energy intake  Food and Nutrient Adminstration: enteral and parenteral nutrition administration, diet order  Physical Activity and Function: nutrition-related ADLs and IADLs  Anthropometric Measurements: weight, weight change  Biochemical Data, Medical Tests and Procedures: glucose/endocrine profile, lipid profile, inflammatory profile, gastrointestinal profile, electrolyte and renal panel     Nutrition Follow-Up    RD Follow-up?: Yes

## 2023-10-12 NOTE — PLAN OF CARE
SW attempted to complete DPA, but pt is intubated and phone call to pt's mother, Kassandra, went to . Left message for call back. Will continue to follow for needs.    TAMY Kapadia, CSW    Case Management Department

## 2023-10-12 NOTE — ASSESSMENT & PLAN NOTE
Recent Labs   Lab 10/12/23  0437   WBC 8.40   RBC 2.80*   HGB 8.4*   HCT 26.9*   *   MCV 96   MCH 30.0   MCHC 31.2*     -- CBC daily and trend   -- Transfuse for hgb < 7   -- Receives EPO with iHD

## 2023-10-12 NOTE — ASSESSMENT & PLAN NOTE
ESRD on iHD MWF  [facility]  4 hours  EDW:  71 kg  UMA AVF    Plan/Recommendations:  -HD @ bedside for volume optimization/metabolic clearance  -dialysate bath adjusted to current electrolytes  -UFG 1-2L as tolerated  -continue strict I/O's  -RFP daily  -will re-evaluate in AM for further needs

## 2023-10-12 NOTE — SUBJECTIVE & OBJECTIVE
Interval History/Significant Events: Patient remains intubated with no significant changes    Review of Systems   Reason unable to perform ROS: Unable to asscess as patient is intubated.     Objective:     Vital Signs (Most Recent):  Temp: 99.5 °F (37.5 °C) (10/12/23 0745)  Pulse: 64 (10/12/23 0904)  Resp: 20 (10/12/23 0830)  BP: (!) 118/55 (10/12/23 0830)  SpO2: 97 % (10/12/23 0830) Vital Signs (24h Range):  Temp:  [94.4 °F (34.7 °C)-99.5 °F (37.5 °C)] 99.5 °F (37.5 °C)  Pulse:  [55-92] 64  Resp:  [19-32] 20  SpO2:  [77 %-100 %] 97 %  BP: ()/(49-93) 118/55   Weight: 70 kg (154 lb 5.2 oz)  Body mass index is 22.79 kg/m².      Intake/Output Summary (Last 24 hours) at 10/12/2023 0908  Last data filed at 10/12/2023 0641  Gross per 24 hour   Intake 2973.14 ml   Output 3600 ml   Net -626.86 ml          Physical Exam  Vitals reviewed.   Constitutional:       Comments: Patient unresponsive and currently sedated    HENT:      Head: Normocephalic and atraumatic.      Nose: Nose normal.   Eyes:      Pupils: Pupils are equal, round, and reactive to light.   Cardiovascular:      Rate and Rhythm: Normal rate and regular rhythm.   Pulmonary:      Comments: Significant respiratory distress requiring mechanical ventilation. Wheezes and crackles are auscultated in all lung fields  Abdominal:      Comments: Abdomen now moderately distended    Musculoskeletal:         General: No swelling or deformity.   Skin:     General: Skin is warm.      Coloration: Skin is not jaundiced.            Vents:  Vent Mode: A/C (10/12/23 0742)  Set Rate: 20 BPM (10/12/23 0742)  Vt Set: 380 mL (10/12/23 0742)  PEEP/CPAP: 8 cmH20 (10/12/23 0742)  Oxygen Concentration (%): 40 (10/12/23 0830)  Peak Airway Pressure: 20 cmH20 (10/12/23 0742)  Plateau Pressure: 20 cmH20 (10/12/23 0742)  Total Ve: 12.9 L/m (10/12/23 0742)  Negative Inspiratory Force (cm H2O): 0 (10/12/23 0742)  F/VT Ratio<105 (RSBI): 144.19 (10/12/23 0742)  Lines/Drains/Airways        Central Venous Catheter Line  Duration             Percutaneous Central Line Insertion/Assessment - Triple Lumen  10/11/23 1501 Internal Jugular Left <1 day              Drain  Duration                  NG/OG Tube 10/11/23 1112 [place] sump Right mouth <1 day              Airway  Duration                  Airway - Non-Surgical 10/11/23 1108 Endotracheal Tube <1 day              Peripheral Intravenous Line  Duration                  Hemodialysis AV Fistula Left upper arm -- days         Peripheral IV - Single Lumen 10/11/23 0210 20 G Anterior;Right Saphenous 1 day         Peripheral IV - Single Lumen 10/11/23 0239 20 G Anterior;Right Upper Arm 1 day                  Significant Labs:    CBC/Anemia Profile:  Recent Labs   Lab 10/11/23  0104 10/11/23  0409 10/12/23  0437   WBC 3.50* 2.81* 8.40   HGB 9.5* 8.2* 8.4*   HCT 29.5* 26.3* 26.9*    145* 100*   MCV 94 94 96   RDW 16.6* 16.3* 16.3*        Chemistries:  Recent Labs   Lab 10/11/23  0104 10/11/23  0409 10/11/23  0535 10/12/23  0437   * 131* 132* 129*   K 4.2 3.6 3.9 4.4   CL 98 99 96 97   CO2 20* 20* 22* 18*   BUN 38* 40* 41* 27*   CREATININE 3.8* 3.5* 3.7* 2.8*   CALCIUM 8.0* 7.5* 7.6* 7.1*   ALBUMIN 3.1* 2.7*  --  2.2*   PROT 6.6 5.7*  --  5.0*   BILITOT 0.8 0.8  --  0.7   ALKPHOS 239* 212*  --  146*   ALT 69* 56*  --  41   AST 91* 75*  --  53*   MG  --  2.0  --  2.1   PHOS  --  3.9  --  4.4       All pertinent labs within the past 24 hours have been reviewed.    Significant Imaging:  I have reviewed all pertinent imaging results/findings within the past 24 hours.

## 2023-10-12 NOTE — PROGRESS NOTES
[facility] - [facility]  Nephrology  Progress Note    Patient Name: Cosme Lewis  MRN: [id number]  Admission Date: 10/11/2023  Hospital Length of Stay: 1 days  Attending Provider: Victor M Mendez MD   Primary Care Physician: Eliecer Ohara III, MD  Principal Problem:Chronic hypoxemic respiratory failure    Subjective:     HPI: Cosme Lewis is a 57 yo male with PMHx of ESRD on iHD MWF who presents from his NH with AMS, hypoxia, and hypoglycemia.  He was placed on BiPAP and transferred to ICU for higher level of care.  CXR with evidence of pulmonary edema, BNP elevated > 3800.  Labs on chemistry without emergent electrolyte abnormalities consistent with ESRD status.  CBC without leukocytosis.  He was found to be COVID + and started on Broad spectrum Abx and steroids. He required D5W gtt for persistent hypoglycemia.  Nephrology has been consulted for ESRD management while IP.      Interval History:   HD Completed yesterday, tolerated well with 3L net UF.  Overall net even for the past 24 hours.  He required intubation during HD yesterday for worsening respiratory distress.  Euglycemic this morning.  Not on pressor support  Electrolytes with Na of 129.    Review of patient's allergies indicates:  No Known Allergies  Current Facility-Administered Medications   Medication Frequency    acetaminophen tablet 650 mg Q6H PRN    albuterol-ipratropium 2.5 mg-0.5 mg/3 mL nebulizer solution 3 mL Q6H    ascorbic acid (vitamin C) tablet 500 mg BID    dexAMETHasone injection 6 mg Daily    dextrose 10% bolus 125 mL 125 mL PRN    dextrose 10% bolus 250 mL 250 mL PRN    doxycycline (VIBRAMYCIN) 100 mg in dextrose 5 % in water (D5W) 100 mL IVPB (MB+) Q12H    famotidine (PF) injection 20 mg Daily    fentaNYL 2500 mcg in 0.9% sodium chloride 250 mL infusion premix (titrating) Continuous    glucagon (human recombinant) injection 1 mg PRN    glucose chewable tablet 16 g PRN    glucose chewable tablet 24 g PRN    heparin (porcine)  injection 5,000 Units Q12H    multivitamin tablet Daily    mupirocin 2 % ointment BID    piperacillin-tazobactam (ZOSYN) 4.5 g in dextrose 5 % in water (D5W) 100 mL IVPB (MB+) Q12H    propofol (DIPRIVAN) 10 mg/mL infusion Continuous    remdesivir 100 mg in sodium chloride 0.9% 250 mL infusion Daily    sodium chloride 0.9% flush 10 mL PRN    vancomycin - pharmacy to dose pharmacy to manage frequency       Objective:     Vital Signs (Most Recent):  Temp: 99.5 °F (37.5 °C) (10/12/23 0745)  Pulse: 64 (10/12/23 0904)  Resp: 20 (10/12/23 0830)  BP: (!) 118/55 (10/12/23 0830)  SpO2: 97 % (10/12/23 0830) Vital Signs (24h Range):  Temp:  [94.4 °F (34.7 °C)-99.5 °F (37.5 °C)] 99.5 °F (37.5 °C)  Pulse:  [55-92] 64  Resp:  [19-31] 20  SpO2:  [77 %-100 %] 97 %  BP: ()/(49-93) 118/55     Weight: 70 kg (154 lb 5.2 oz) (10/11/23 0030)  Body mass index is 22.79 kg/m².  Body surface area is 1.85 meters squared.    I/O last 3 completed shifts:  In: 4275.1 [I.V.:2626.6; IV Piggyback:1648.6]  Out: 3600 [Other:3600]     Physical Exam  Vitals and nursing note reviewed.   Constitutional:       General: He is in acute distress.      Appearance: He is well-developed. He is ill-appearing.      Interventions: He is sedated and intubated.   HENT:      Head: Normocephalic and atraumatic.      Nose: No congestion or rhinorrhea.   Eyes:      General: No scleral icterus.        Right eye: No discharge.         Left eye: No discharge.      Extraocular Movements: Extraocular movements intact.      Conjunctiva/sclera: Conjunctivae normal.   Neck:      Vascular: JVD present.   Cardiovascular:      Rate and Rhythm: Normal rate and regular rhythm.      Heart sounds: No murmur heard.     No friction rub. No gallop.      Comments: UMA GERARDO  Pulmonary:      Effort: Respiratory distress present. He is intubated.      Breath sounds: Rhonchi and rales present. No wheezing.   Abdominal:      General: There is no distension.      Palpations: Abdomen  is soft.      Tenderness: There is no abdominal tenderness.   Musculoskeletal:         General: No swelling.      Cervical back: Normal range of motion.      Right lower leg: No edema.      Left lower leg: No edema.   Skin:     General: Skin is warm and dry.          Significant Labs:  CBC:   Recent Labs   Lab 10/12/23  0437   WBC 8.40   RBC 2.80*   HGB 8.4*   HCT 26.9*   *   MCV 96   MCH 30.0   MCHC 31.2*     CMP:   Recent Labs   Lab 10/12/23  0437      CALCIUM 7.1*   ALBUMIN 2.2*   PROT 5.0*   *   K 4.4   CO2 18*   CL 97   BUN 27*   CREATININE 2.8*   ALKPHOS 146*   ALT 41   AST 53*   BILITOT 0.7          Assessment/Plan:     Renal/  Hyponatremia  Hypervolemic hyponatremia  -Na bath with HD today    ESRD (end stage renal disease)  ESRD on iHD MWF  [facility] [place], [place]  4 hours  EDW:  71 kg  UMA AVF    Plan/Recommendations:  -HD @ bedside for volume optimization/metabolic clearance  -dialysate bath adjusted to current electrolytes  -UFG 1-2L as tolerated  -continue strict I/O's  -RFP daily  -will re-evaluate in AM for further needs    Oncology  Anemia in ESRD (end-stage renal disease)  Below goal for ESRD  -if here greater than 72 hours, will consider starting on MARCIA therapy        Juventino Nguyen NP  Nephrology  [facility] - [facility]

## 2023-10-12 NOTE — ASSESSMENT & PLAN NOTE
Patient with Hypoxic Respiratory failure which is Acute.  he is not on home oxygen. Supplemental oxygen was provided and noted- Vent Mode: A/C  Oxygen Concentration (%):  [] 40  Resp Rate Total:  [16 br/min-90 br/min] 20 br/min  Vt Set:  [380 mL-400 mL] 380 mL  PEEP/CPAP:  [5 cmH20-8 cmH20] 8 cmH20  Mean Airway Pressure:  [9.9 laO98-78 cmH20] 12 cmH20    .   Signs/symptoms of respiratory failure include- respiratory distress. Contributing diagnoses includes - ARDS Labs and images were reviewed. Patient Has recent ABG, which has been reviewed. Will treat underlying causes and adjust management of respiratory failure as follows- continuing antibiotics and antivirals, adjustments of mechanical ventilation, HD and fluid regulation.

## 2023-10-12 NOTE — PLAN OF CARE
Recommendations     1. Initiate TFs when able. Rec'd Novasource @ 35 mL/hr to provide 1680 kcals, 76 g of protein, 602 mL fluid.   2. RD to monitor & follow-up.     Goals: Meet % EEN, EPN by RD f/u date  Nutrition Goal Status: new  Communication of RD Recs: discussed on rounds

## 2023-10-12 NOTE — PROGRESS NOTES
[facility] - [facility]  Critical Care Medicine  Progress Note    Patient Name: Cosme Lewis  MRN: [id number]  Admission Date: 10/11/2023  Hospital Length of Stay: 1 days  Code Status: Full Code  Attending Provider: Victor M Mendez MD  Primary Care Provider: Eliecer Ohara III, MD   Principal Problem: Chronic hypoxemic respiratory failure    Subjective:     HPI:  Mr. Lewis is a 58 year old male with PMH notable for COPD, HFrEF (45% and DD), ESRD on dialysis, HTN, DM presents via EMS from nursing home for altered mental status and hypoxia.  Per EMS, patient was found to have glucose of 38, he received D50 with improvement in his glucose.  He also had SpO2 58% which improved with 15 L nasal cannula.     ED work up revealed VBG 7.27/61 and he was subsequently placed on bipap for work of breathing. He was persistently hypoglycemic with BG 59 --> 34 despite IV dextrose, therefore he was started on a continuous dextrose infusion. Other lab work up revealed WBC 3, stable, H//H 9.5/29, Na 143, CO2 20, Cr/BUN 3.8/38, elevated AST/ALT/alk phos, and troponin 0.174 with no ischemic changes on EKG. He was given IV rocephin and doxycyline. Chest XR with bilateral opacifications R > L.    Critical care consulted for acute hypoxemic respiratory failure and NIPPV      Hospital/ICU Course:  In the MICU, the patient arrived on bipap but was found to still be hypoxic which initially improved after adjusting his bipap settings. He continued to be altered and tried occasionally became combative requiring precedex which was affective when maxed out at 1.4 mcg/kg/hour. During this time he was also found to have worsening hypoglycemia which required continuous D10 administration at a max rate of 200 cc/hour. Eventually his oxygen status started declining and the decision was made to intubate the patient. It is believed that worsening of his oxygen status was in part due the large volume of D10 he received to maintain his blood sugars. For this  reason, it was decided that D20 at a slower rate would be more appropriate, and for this a central line was placed. Additionally, he received a round of hemodialysis. He is receiving remdesivir for COVID and vacomycin/zosyn/doxycycline for community acquired pneumonia coverage/concerning chest x-ray.     The patient's sugars started rising on the D20 and eventually he started becoming hyperglycemic  to the 220s which led to the D20 being stopped for the time being and since that time the sugars have been relatively normoglycemic. His abdomen was found to be more distended and a KUB indicated this was likely ascities so consideration of a paracentesis to evaluate for SBP.         Interval History/Significant Events: Patient remains intubated with no significant changes    Review of Systems   Reason unable to perform ROS: Unable to asscess as patient is intubated.     Objective:     Vital Signs (Most Recent):  Temp: 99.5 °F (37.5 °C) (10/12/23 0745)  Pulse: 64 (10/12/23 0904)  Resp: 20 (10/12/23 0830)  BP: (!) 118/55 (10/12/23 0830)  SpO2: 97 % (10/12/23 0830) Vital Signs (24h Range):  Temp:  [94.4 °F (34.7 °C)-99.5 °F (37.5 °C)] 99.5 °F (37.5 °C)  Pulse:  [55-92] 64  Resp:  [19-32] 20  SpO2:  [77 %-100 %] 97 %  BP: ()/(49-93) 118/55   Weight: 70 kg (154 lb 5.2 oz)  Body mass index is 22.79 kg/m².      Intake/Output Summary (Last 24 hours) at 10/12/2023 0908  Last data filed at 10/12/2023 0641  Gross per 24 hour   Intake 2973.14 ml   Output 3600 ml   Net -626.86 ml          Physical Exam  Vitals reviewed.   Constitutional:       Comments: Patient unresponsive and currently sedated    HENT:      Head: Normocephalic and atraumatic.      Nose: Nose normal.   Eyes:      Pupils: Pupils are equal, round, and reactive to light.   Cardiovascular:      Rate and Rhythm: Normal rate and regular rhythm.   Pulmonary:      Comments: Significant respiratory distress requiring mechanical ventilation. Wheezes and crackles are  auscultated in all lung fields  Abdominal:      Comments: Abdomen now moderately distended    Musculoskeletal:         General: No swelling or deformity.   Skin:     General: Skin is warm.      Coloration: Skin is not jaundiced.            Vents:  Vent Mode: A/C (10/12/23 0742)  Set Rate: 20 BPM (10/12/23 0742)  Vt Set: 380 mL (10/12/23 0742)  PEEP/CPAP: 8 cmH20 (10/12/23 0742)  Oxygen Concentration (%): 40 (10/12/23 0830)  Peak Airway Pressure: 20 cmH20 (10/12/23 0742)  Plateau Pressure: 20 cmH20 (10/12/23 0742)  Total Ve: 12.9 L/m (10/12/23 0742)  Negative Inspiratory Force (cm H2O): 0 (10/12/23 0742)  F/VT Ratio<105 (RSBI): 144.19 (10/12/23 0742)  Lines/Drains/Airways       Central Venous Catheter Line  Duration             Percutaneous Central Line Insertion/Assessment - Triple Lumen  10/11/23 1501 Internal Jugular Left <1 day              Drain  Duration                  NG/OG Tube 10/11/23 1112 [place] sump Right mouth <1 day              Airway  Duration                  Airway - Non-Surgical 10/11/23 1108 Endotracheal Tube <1 day              Peripheral Intravenous Line  Duration                  Hemodialysis AV Fistula Left upper arm -- days         Peripheral IV - Single Lumen 10/11/23 0210 20 G Anterior;Right Saphenous 1 day         Peripheral IV - Single Lumen 10/11/23 0239 20 G Anterior;Right Upper Arm 1 day                  Significant Labs:    CBC/Anemia Profile:  Recent Labs   Lab 10/11/23  0104 10/11/23  0409 10/12/23  0437   WBC 3.50* 2.81* 8.40   HGB 9.5* 8.2* 8.4*   HCT 29.5* 26.3* 26.9*    145* 100*   MCV 94 94 96   RDW 16.6* 16.3* 16.3*        Chemistries:  Recent Labs   Lab 10/11/23  0104 10/11/23  0409 10/11/23  0535 10/12/23  0437   * 131* 132* 129*   K 4.2 3.6 3.9 4.4   CL 98 99 96 97   CO2 20* 20* 22* 18*   BUN 38* 40* 41* 27*   CREATININE 3.8* 3.5* 3.7* 2.8*   CALCIUM 8.0* 7.5* 7.6* 7.1*   ALBUMIN 3.1* 2.7*  --  2.2*   PROT 6.6 5.7*  --  5.0*   BILITOT 0.8 0.8  --  0.7    ALKPHOS 239* 212*  --  146*   ALT 69* 56*  --  41   AST 91* 75*  --  53*   MG  --  2.0  --  2.1   PHOS  --  3.9  --  4.4       All pertinent labs within the past 24 hours have been reviewed.    Significant Imaging:  I have reviewed all pertinent imaging results/findings within the past 24 hours.      ABG  Recent Labs   Lab 10/12/23  1141   PH 7.389   PO2 84   PCO2 34.8*   HCO3 21.0*   BE -4     Assessment/Plan:     Pulmonary  * Chronic hypoxemic respiratory failure  Patient with Hypercapnic and Hypoxic Respiratory failure which is Chronic.  he is not on home oxygen. Supplemental oxygen was provided and noted- Vent Mode: A/C  Oxygen Concentration (%):  [] 40  Resp Rate Total:  [16 br/min-90 br/min] 20 br/min  Vt Set:  [380 mL-400 mL] 380 mL  PEEP/CPAP:  [5 cmH20-8 cmH20] 8 cmH20  Mean Airway Pressure:  [9.9 ulF18-96 cmH20] 12 cmH20    Signs/symptoms of respiratory failure include- tachypnea, increased work of breathing and use of accessory muscles. Contributing diagnoses includes - CHF, COPD and fluid volume excess Labs and images were reviewed. Patient Has recent ABG, which has been reviewed. Will treat underlying causes and adjust management of respiratory failure as follows-     58 year old male with multiple medical problems who presented to [facility] [place] via EMS for AMS, hypoglycemia, and hypoxia placed on NIPPV. Per chart review patient with room air saturation in 50s which improved with HFNC, however, he remained tachypnic with accessory muscle use, therefore he was placed on NIPPV. Unclear if patient requires oxygen at baseline. Previous hospitalizations with oxygen use. CXR with bilateral opacifications R > L and BNP elevated > 4900.     - Intubated and mechanically ventilated, starting to wean off  - Monitoring ABGs, right now reassuring   - Chest x-ray concerning for pneumonia vs ARDS. CAP coverage with Vancomycin, Zosyn and Doxycycline.  - Remdesivir for COVID. Hydrocortisone 100mg Q6 changed to  dexamethasone 6 mg daily  - Received HD (10/11), Nephrology following and recommends HD again today    Acute respiratory failure with hypoxia and hypercapnia  Patient with Hypoxic Respiratory failure which is Acute.  he is not on home oxygen. Supplemental oxygen was provided and noted- Vent Mode: A/C  Oxygen Concentration (%):  [] 40  Resp Rate Total:  [16 br/min-90 br/min] 20 br/min  Vt Set:  [380 mL-400 mL] 380 mL  PEEP/CPAP:  [5 cmH20-8 cmH20] 8 cmH20  Mean Airway Pressure:  [9.9 jvW57-99 cmH20] 12 cmH20    .   Signs/symptoms of respiratory failure include- respiratory distress. Contributing diagnoses includes - ARDS Labs and images were reviewed. Patient Has recent ABG, which has been reviewed. Will treat underlying causes and adjust management of respiratory failure as follows- continuing antibiotics and antivirals, adjustments of mechanical ventilation, HD and fluid regulation.     COPD (chronic obstructive pulmonary disease)  Currently Intubated and mechanically ventilated     Cardiac/Vascular  HLD (hyperlipidemia)  -- Hold statin in the setting of elevated LFTs   -- CMP daily. Will resume Atorvastatin when LFTs improve     Chronic diastolic heart failure  ECHO 09/23:       Left Ventricle: The left ventricle is normal in size. Increased wall thickness. There is concentric hypertrophy. Mild global hypokinesis present. There is mildly reduced systolic function with a visually estimated ejection fraction of 40 - 45%. There is indeterminate diastolic function.    Right Ventricle: Normal right ventricular cavity size. Right ventricle wall motion  is normal. Systolic function is borderline low.    Left Atrium: Left atrium is severely dilated.    Tricuspid Valve: There is mild regurgitation.    Pericardium: There is a trivial effusion. Echodensity seen adjacent to the visceral pericardium of uncertain significance. This may be pericardial fat although other etiologies not excluded. Correlate  clinically.    PASP is at least 46 mmHg.    -- BNP > 4900. Volume removal per iHD. Appreciate nephrology assistance   -- Home BP meds discontinued as pressures have been within normal limits. Hydralazine push should pressures rise     Renal/  Hyponatremia  Acute on chronic, contributions include kidney failure and previous oral rehydration with D10/D20 for glucose management. Resolving    ESRD (end stage renal disease)  -Outpatient HD unit: [facility]   -HD tx days: MWF   -HD tx time: 4hrs   -HD access: LUE AVF   -HD modality: iHD   -Residual urine: Anuric     -- Nephrology consulted for iHD management. Appreciate their assistance   -- CMP daily and trend   -- Avoid nephrotoxins   -- Renally dose all medications to appropriate GFR / CrCl   -- Estimated Creatinine Clearance: 27.8 mL/min (A) (based on SCr of 2.8 mg/dL (H)).  -- Hgb > 7 and MAP > 65 goals     Hematology  History of pulmonary embolism  Diagnosed 10/2022  Repeat CT 3/2023 with resolution    -hold home eliquis for mental status change  -VTE ppx with heparin    Oncology  Anemia in ESRD (end-stage renal disease)  Recent Labs   Lab 10/12/23  0437   WBC 8.40   RBC 2.80*   HGB 8.4*   HCT 26.9*   *   MCV 96   MCH 30.0   MCHC 31.2*     -- CBC daily and trend   -- Transfuse for hgb < 7   -- Receives EPO with iHD    Endocrine  Hyperglycemia due to type 2 diabetes mellitus  See hypoglycemia        Critical Care Daily Checklist:    A: Awake: RASS Goal/Actual Goal: RASS Goal: -2-->light sedation  Actual:     B: Spontaneous Breathing Trial Performed? Spon. Breathing Trial Initiated?: Not initiated (10/12/23 0742)   C: SAT & SBT Coordinated?  SAT to be attempted today, possibly SBT if SAT successful but if not tomorrow                      D: Delirium: CAM-ICU     E: Early Mobility Performed? No   F: Feeding Goal: Goals: Meet % EEN, EPN by RD f/u date  Status: Nutrition Goal Status: new   Current Diet Order   Procedures    Diet NPO      AS:  Analgesia/Sedation Propofol and fentanyl drip   T: Thromboembolic Prophylaxis Heparin   H: HOB > 300 Yes   U: Stress Ulcer Prophylaxis (if needed) None   G: Glucose Control D20 if he becomes hypoglycemic    B: Bowel Function     I: Indwelling Catheter (Lines & Dutta) Necessity L-IJ, PIV,    D: De-escalation of Antimicrobials/Pharmacotherapies Still receiving Vancomycin, Zosyn and Doxycycline     Plan for the day/ETD Continue antibiotics, SAT and possibly SBT, blood sugar management     Code Status:  Family/Goals of Care: Full Code         Critical secondary to Patient has a condition that poses threat to life and bodily function: Kidney failure, liver failure and possibly infectious etiology      Critical care was time spent personally by me on the following activities: development of treatment plan with patient or surrogate and bedside caregivers, discussions with consultants, evaluation of patient's response to treatment, examination of patient, ordering and performing treatments and interventions, ordering and review of laboratory studies, ordering and review of radiographic studies, pulse oximetry, re-evaluation of patient's condition. This critical care time did not overlap with that of any other provider or involve time for any procedures.     Robbie Fields MD  Critical Care Medicine  [facility] - Cardiac Medical ICU

## 2023-10-13 NOTE — ASSESSMENT & PLAN NOTE
Patient with Hypercapnic and Hypoxic Respiratory failure which is Chronic.  he is not on home oxygen. Supplemental oxygen was provided and noted-  Signs/symptoms of respiratory failure include- tachypnea, increased work of breathing and use of accessory muscles. Contributing diagnoses includes - CHF, COPD and fluid volume excess Labs and images were reviewed. Patient Has recent ABG, which has been reviewed. Will treat underlying causes and adjust management of respiratory failure as follows-     58 year old male with multiple medical problems who presented to [facility] ED via EMS for AMS, hypoglycemia, and hypoxia placed on NIPPV. Per chart review patient with room air saturation in 50s which improved with HFNC, however, he remained tachypnic with accessory muscle use, therefore he was placed on NIPPV. Unclear if patient requires oxygen at baseline. Previous hospitalizations with oxygen use. CXR with bilateral opacifications R > L and BNP elevated > 4900.     Intubated and mechanically ventilated, extubated to NC 10/13, sating well  Chest x-ray concerning for pneumonia vs ARDS    - Monitoring ABGs PRN  - Remdesivir for COVID. Hydrocortisone 100mg Q6 changed to dexamethasone 6 mg daily  - CAP coverage with Vanc/Zosyn and Doxycycline.  - Received HD (10/11), Nephrology following and recommends HD again today

## 2023-10-13 NOTE — ASSESSMENT & PLAN NOTE
ESRD on iHD MWF  [facility]  4 hours  EDW:  71 kg  UMA AVF    Plan/Recommendations:  -HD @ bedside for volume optimization/metabolic clearance  -dialysate bath adjusted to current electrolytes  -UFG 1-2L as tolerated  -continue strict I/O's  -RFP daily  -plan for additional HD session tomorrow

## 2023-10-13 NOTE — PROGRESS NOTES
Hd completed X 3 Hrs,  2 liters removed, needles removed x 2, hemostasis achieved. Pt left in NAD.

## 2023-10-13 NOTE — ASSESSMENT & PLAN NOTE
Recent Labs   Lab 10/13/23  0326   WBC 9.99   RBC 2.94*   HGB 8.9*   HCT 27.6*   PLT 97*   MCV 94   MCH 30.3   MCHC 32.2     -- CBC daily and trend   -- Transfuse for hgb < 7   -- Receives EPO with iHD

## 2023-10-13 NOTE — ASSESSMENT & PLAN NOTE
Outpatient HD [facility]: [facility]   HD tx days: MWF   HD tx time: 4hrs   HD access: LUE AVF   HD modality: iHD   Residual urine: Anuric   Estimated Creatinine Clearance: 33.8 mL/min (A) (based on SCr of 2.3 mg/dL (H)).    Toelrating iHD well    -- Nephrology consulted for iHD management. Appreciate their assistance   -- CMP daily and trend   -- Avoid nephrotoxins   -- Renally dose all medications to appropriate GFR / CrCl   -- Hgb > 7 and MAP > 65 goals

## 2023-10-13 NOTE — ASSESSMENT & PLAN NOTE
Patient with Hypoxic Respiratory failure which is Acute.  he is not on home oxygen. Supplemental oxygen was provided and noted.  Signs/symptoms of respiratory failure include- respiratory distress. Contributing diagnoses includes - ARDS Labs and images were reviewed. Patient Has recent ABG, which has been reviewed. Will treat underlying causes and adjust management of respiratory failure as follows- continuing antibiotics and antivirals, adjustments of mechanical ventilation, HD and fluid regulation.     Extubated to 5L NC morning of 10/13. In no acute distress  VBG 7.2552  However mental status improving    -continue to monitor on NC  -ABG as warranted  -holding off on Bipap for now as patient mentation is improved since blood gas

## 2023-10-13 NOTE — SUBJECTIVE & OBJECTIVE
Interval History:   Extubated overnight, oxygenating well in NAD.  HD was unable to be performed yesterday due to high volume of treatments, seen on HD this morning.  Hyperkalemic at 5.9 on AM labs with HAGMA with bicarb of 16.  Net positive 1.5L/24h.    Review of patient's allergies indicates:  No Known Allergies  Current Facility-Administered Medications   Medication Frequency    acetaminophen tablet 650 mg Q6H PRN    albuterol-ipratropium 2.5 mg-0.5 mg/3 mL nebulizer solution 3 mL Q6H    albuterol-ipratropium 2.5 mg-0.5 mg/3 mL nebulizer solution 3 mL Q6H PRN    ascorbic acid (vitamin C) tablet 500 mg BID    dexAMETHasone injection 6 mg Daily    dexmedetomidine (PRECEDEX) 400mcg/100mL 0.9% NaCL infusion Continuous    dextrose 10% bolus 125 mL 125 mL PRN    dextrose 10% bolus 250 mL 250 mL PRN    dextrose 10% bolus 250 mL 250 mL PRN    doxycycline (VIBRAMYCIN) 100 mg in dextrose 5 % in water (D5W) 100 mL IVPB (MB+) Q12H    famotidine tablet 20 mg Daily    glucagon (human recombinant) injection 1 mg PRN    glucose chewable tablet 16 g PRN    glucose chewable tablet 24 g PRN    heparin (porcine) injection 5,000 Units Q12H    insulin aspart U-100 pen 0-10 Units Q6H PRN    multivitamin tablet Daily    mupirocin 2 % ointment BID    piperacillin-tazobactam (ZOSYN) 4.5 g in dextrose 5 % in water (D5W) 100 mL IVPB (MB+) Q12H    remdesivir 100 mg in sodium chloride 0.9% 250 mL infusion Daily    sodium chloride 0.9% flush 10 mL PRN    vancomycin - pharmacy to dose pharmacy to manage frequency       Objective:     Vital Signs (Most Recent):  Temp: 98 °F (36.7 °C) (10/13/23 0800)  Pulse: 61 (10/13/23 1015)  Resp: 20 (10/13/23 0930)  BP: 136/68 (10/13/23 1015)  SpO2: 95 % (10/13/23 0930) Vital Signs (24h Range):  Temp:  [97 °F (36.1 °C)-99.1 °F (37.3 °C)] 98 °F (36.7 °C)  Pulse:  [57-84] 61  Resp:  [11-39] 20  SpO2:  [84 %-100 %] 95 %  BP: ()/(55-95) 136/68     Weight: 70 kg (154 lb 5.2 oz) (10/12/23 1322)  Body mass  index is 22.79 kg/m².  Body surface area is 1.85 meters squared.    I/O last 3 completed shifts:  In: 2436.5 [I.V.:923; NG/GT:215; IV Piggyback:1298.5]  Out: -      Physical Exam  Vitals and nursing note reviewed.   Constitutional:       General: He is not in acute distress.     Appearance: He is well-developed. He is ill-appearing.      Interventions: Nasal cannula in place.   HENT:      Head: Normocephalic and atraumatic.      Nose: No congestion or rhinorrhea.   Eyes:      General: No scleral icterus.        Right eye: No discharge.         Left eye: No discharge.      Extraocular Movements: Extraocular movements intact.      Conjunctiva/sclera: Conjunctivae normal.   Neck:      Vascular: JVD present.   Cardiovascular:      Rate and Rhythm: Normal rate and regular rhythm.      Heart sounds: No murmur heard.     No friction rub. No gallop.      Comments: UMA AVF  Pulmonary:      Effort: No respiratory distress.      Breath sounds: Rhonchi and rales present. No wheezing.   Abdominal:      General: There is no distension.      Palpations: Abdomen is soft.      Tenderness: There is no abdominal tenderness.   Musculoskeletal:         General: No swelling.      Cervical back: Normal range of motion.      Right lower leg: No edema.      Left lower leg: No edema.   Skin:     General: Skin is warm and dry.   Neurological:      Mental Status: He is alert.   Psychiatric:         Behavior: Behavior is uncooperative.          Significant Labs:  CBC:   Recent Labs   Lab 10/13/23  0326   WBC 9.99   RBC 2.94*   HGB 8.9*   HCT 27.6*   PLT 97*   MCV 94   MCH 30.3   MCHC 32.2     CMP:   Recent Labs   Lab 10/13/23  0326   *   CALCIUM 7.3*   ALBUMIN 2.1*   PROT 4.8*   *   K 5.9*   CO2 16*   CL 98   BUN 44*   CREATININE 3.6*   ALKPHOS 130   ALT 37   AST 64*   BILITOT 1.5*

## 2023-10-13 NOTE — PROGRESS NOTES
[facility] - Cardiac Medical ICU  Critical Care Medicine  Progress Note    Patient Name: Cosme Lewis  MRN: [id number]  Admission Date: 10/11/2023  Hospital Length of Stay: 2 days  Code Status: Full Code  Attending Provider: Victor M Mendez MD  Primary Care Provider: Eliecer Ohara III, MD   Principal Problem: Chronic hypoxemic respiratory failure    Subjective:     HPI:  Mr. Lewis is a 58 year old male with PMH notable for COPD, HFrEF (45% and DD), ESRD on dialysis, HTN, DM presents via EMS from nursing home for altered mental status and hypoxia.  Per EMS, patient was found to have glucose of 38, he received D50 with improvement in his glucose.  He also had SpO2 58% which improved with 15 L nasal cannula.     ED work up revealed VBG 7.27/61 and he was subsequently placed on bipap for work of breathing. He was persistently hypoglycemic with BG 59 --> 34 despite IV dextrose, therefore he was started on a continuous dextrose infusion. Other lab work up revealed WBC 3, stable, H//H 9.5/29, Na 143, CO2 20, Cr/BUN 3.8/38, elevated AST/ALT/alk phos, and troponin 0.174 with no ischemic changes on EKG. He was given IV rocephin and doxycyline. Chest XR with bilateral opacifications R > L.    Critical care consulted for acute hypoxemic respiratory failure and NIPPV      Hospital/ICU Course:  In the MICU, the patient arrived on bipap but was found to still be hypoxic which initially improved after adjusting his bipap settings. He continued to be altered and tried occasionally became combative requiring precedex which was affective when maxed out at 1.4 mcg/kg/hour. During this time he was also found to have worsening hypoglycemia which required continuous D10 administration at a max rate of 200 cc/hour. Eventually his oxygen status started declining and the decision was made to intubate the patient. It is believed that worsening of his oxygen status was in part due the large volume of D10 he received to maintain his blood sugars. For this  reason, it was decided that D20 at a slower rate would be more appropriate, and for this a central line was placed. Additionally, he received a round of hemodialysis. He is receiving remdesivir for COVID and vacomycin/zosyn/doxycycline for community acquired pneumonia coverage/concerning chest x-ray.     The patient's sugars started rising on the D20 and eventually he started becoming hyperglycemic  to the 220s which led to the D20 being stopped for the time being and since that time the sugars have been relatively normoglycemic. His abdomen was found to be more distended and a KUB indicated this was likely ascities so consideration of a paracentesis to evaluate for SBP.         Interval History/Significant Events: NAEO, VSS, sating 97% on 5L, however concern for airway protection. Patient mentation improved by afternoon. Will continue to monitor respiratory status to determine need for continued BiPap    Review of Systems   Unable to perform ROS: Acuity of condition     Objective:     Vital Signs (Most Recent):  Temp: 98 °F (36.7 °C) (10/13/23 0800)  Pulse: 61 (10/13/23 0900)  Resp: 20 (10/13/23 0900)  BP: 131/65 (10/13/23 0900)  SpO2: (!) 94 % (10/13/23 0900) Vital Signs (24h Range):  Temp:  [97 °F (36.1 °C)-99.1 °F (37.3 °C)] 98 °F (36.7 °C)  Pulse:  [57-84] 61  Resp:  [11-39] 20  SpO2:  [84 %-100 %] 94 %  BP: ()/(55-95) 131/65   Weight: 70 kg (154 lb 5.2 oz)  Body mass index is 22.79 kg/m².      Intake/Output Summary (Last 24 hours) at 10/13/2023 0915  Last data filed at 10/13/2023 0632  Gross per 24 hour   Intake 1265.32 ml   Output --   Net 1265.32 ml          Physical Exam  Vitals reviewed.   Constitutional:       General: He is not in acute distress.     Appearance: He is ill-appearing.      Comments: Minimally responsive   HENT:      Head: Normocephalic and atraumatic.      Right Ear: External ear normal.      Left Ear: External ear normal.      Nose: Nose normal.   Eyes:      General:         Right  eye: No discharge.         Left eye: No discharge.      Pupils: Pupils are equal, round, and reactive to light.   Cardiovascular:      Rate and Rhythm: Normal rate and regular rhythm.   Pulmonary:      Effort: Respiratory distress present.      Breath sounds: Wheezing present.   Abdominal:      General: There is distension.      Tenderness: There is no abdominal tenderness.   Musculoskeletal:         General: No swelling or deformity.   Skin:     General: Skin is warm.      Coloration: Skin is not jaundiced.            Lines/Drains/Airways       Central Venous Catheter Line  Duration             Percutaneous Central Line Insertion/Assessment - Triple Lumen  10/11/23 1501 Internal Jugular Left 1 day              Drain  Duration                  NG/OG Tube 10/12/23 1612 14 Fr. Left nostril <1 day              Peripheral Intravenous Line  Duration                  Hemodialysis AV Fistula Left upper arm -- days         Peripheral IV - Single Lumen 10/11/23 0210 20 G Anterior;Right Saphenous 2 days         Peripheral IV - Single Lumen 10/11/23 0239 20 G Anterior;Right Upper Arm 2 days                  Significant Labs:    CBC/Anemia Profile:  Recent Labs   Lab 10/12/23  0437 10/13/23  0326   WBC 8.40 9.99   HGB 8.4* 8.9*   HCT 26.9* 27.6*   * 97*   MCV 96 94   RDW 16.3* 16.3*        Chemistries:  Recent Labs   Lab 10/12/23  0437 10/13/23  0326   * 127*   K 4.4 5.9*   CL 97 98   CO2 18* 16*   BUN 27* 44*   CREATININE 2.8* 3.6*   CALCIUM 7.1* 7.3*   ALBUMIN 2.2* 2.1*   PROT 5.0* 4.8*   BILITOT 0.7 1.5*   ALKPHOS 146* 130   ALT 41 37   AST 53* 64*   MG 2.1 2.4   PHOS 4.4 7.3*       All pertinent labs within the past 24 hours have been reviewed.    Significant Imaging:  I have reviewed all pertinent imaging results/findings within the past 24 hours.      ABG  Recent Labs   Lab 10/13/23  0839   PH 7.272*   PO2 29*   PCO2 52.0*   HCO3 24.0   BE -3     Assessment/Plan:     Pulmonary  * Chronic hypoxemic respiratory  failure  Patient with Hypercapnic and Hypoxic Respiratory failure which is Chronic.  he is not on home oxygen. Supplemental oxygen was provided and noted-  Signs/symptoms of respiratory failure include- tachypnea, increased work of breathing and use of accessory muscles. Contributing diagnoses includes - CHF, COPD and fluid volume excess Labs and images were reviewed. Patient Has recent ABG, which has been reviewed. Will treat underlying causes and adjust management of respiratory failure as follows-     58 year old male with multiple medical problems who presented to [facility] ED via EMS for AMS, hypoglycemia, and hypoxia placed on NIPPV. Per chart review patient with room air saturation in 50s which improved with HFNC, however, he remained tachypnic with accessory muscle use, therefore he was placed on NIPPV. Unclear if patient requires oxygen at baseline. Previous hospitalizations with oxygen use. CXR with bilateral opacifications R > L and BNP elevated > 4900.     Intubated and mechanically ventilated, extubated to NC 10/13, sating well  Chest x-ray concerning for pneumonia vs ARDS    - Monitoring ABGs PRN  - Remdesivir for COVID. Hydrocortisone 100mg Q6 changed to dexamethasone 6 mg daily  - CAP coverage with Vanc/Zosyn and Doxycycline.  - Received HD (10/11), Nephrology following and recommends HD again today    Acute respiratory failure with hypoxia and hypercapnia  Patient with Hypoxic Respiratory failure which is Acute.  he is not on home oxygen. Supplemental oxygen was provided and noted.  Signs/symptoms of respiratory failure include- respiratory distress. Contributing diagnoses includes - ARDS Labs and images were reviewed. Patient Has recent ABG, which has been reviewed. Will treat underlying causes and adjust management of respiratory failure as follows- continuing antibiotics and antivirals, adjustments of mechanical ventilation, HD and fluid regulation.     Extubated to 5L NC morning of 10/13. In no acute  distress  VBG 7.2552  However mental status improving    -continue to monitor on NC  -ABG as warranted  -holding off on Bipap for now as patient mentation is improved since blood gas    COPD (chronic obstructive pulmonary disease)  Duo nebs Q4wake    Cardiac/Vascular  HLD (hyperlipidemia)  -- Hold statin in the setting of elevated LFTs   -- CMP daily. Will resume Atorvastatin when LFTs improve    Chronic diastolic heart failure  ECHO 09/23:       Left Ventricle: The left ventricle is normal in size. Increased wall thickness. There is concentric hypertrophy. Mild global hypokinesis present. There is mildly reduced systolic function with a visually estimated ejection fraction of 40 - 45%. There is indeterminate diastolic function.    Right Ventricle: Normal right ventricular cavity size. Right ventricle wall motion  is normal. Systolic function is borderline low.    Left Atrium: Left atrium is severely dilated.    Tricuspid Valve: There is mild regurgitation.    Pericardium: There is a trivial effusion. Echodensity seen adjacent to the visceral pericardium of uncertain significance. This may be pericardial fat although other etiologies not excluded. Correlate clinically.    PASP is at least 46 mmHg.    BNP > 4900    --Volume removal per iHD. Appreciate nephrology assistance   -- Home BP meds discontinued as pressures have been within normal limits. Hydralazine push should pressures rise     Renal/  Hyponatremia  Acute on chronic, contributions include kidney failure and previous oral rehydration with D10/D20 for glucose management. Resolving    ESRD (end stage renal disease)  [facility] HD [facility]   HD tx days: MWF   HD tx time: 4hrs   HD access: LUE AVF   HD modality: iHD   Residual urine: Anuric   Estimated Creatinine Clearance: 33.8 mL/min (A) (based on SCr of 2.3 mg/dL (H)).    Toelrating iHD well    -- Nephrology consulted for iHD management. Appreciate their assistance   -- CMP daily  and trend   -- Avoid nephrotoxins   -- Renally dose all medications to appropriate GFR / CrCl   -- Hgb > 7 and MAP > 65 goals     Hematology  History of pulmonary embolism  Diagnosed 10/2022  Repeat CT 3/2023 with resolution    -hold home eliquis for mental status change  -VTE ppx with heparin    Oncology  Anemia in ESRD (end-stage renal disease)  Recent Labs   Lab 10/13/23  0326   WBC 9.99   RBC 2.94*   HGB 8.9*   HCT 27.6*   PLT 97*   MCV 94   MCH 30.3   MCHC 32.2     -- CBC daily and trend   -- Transfuse for hgb < 7   -- Receives EPO with iHD    Endocrine  Hyperglycemia due to type 2 diabetes mellitus  See hypoglycemia       Critical Care Daily Checklist:    A: Awake: RASS Goal/Actual Goal: RASS Goal: 0-->alert and calm  Actual:     B: Spontaneous Breathing Trial Performed? Spon. Breathing Trial Initiated?: Not initiated (10/12/23 0742)   C: SAT & SBT Coordinated?  Extubated                      D: Delirium: CAM-ICU     E: Early Mobility Performed? No   F: Feeding Goal: Goals: Meet % EEN, EPN by RD f/u date  Status: Nutrition Goal Status: new   Current Diet Order   Procedures    Diet NPO      AS: Analgesia/Sedation Currently off Precedex   T: Thromboembolic Prophylaxis Heparin SQ   H: HOB > 300 Yes   U: Stress Ulcer Prophylaxis (if needed) N/A   G: Glucose Control MDSSI   B: Bowel Function     I: Indwelling Catheter (Lines & Dutta) Necessity N/a   D: De-escalation of Antimicrobials/Pharmacotherapies Continue BSABX    Plan for the day/ETD Monitor respiratory status    Code Status:  Family/Goals of Care: Full Code         Critical secondary to Patient has a condition that poses threat to life and bodily function: Severe Respiratory Distress      Critical care was time spent personally by me on the following activities: development of treatment plan with patient or surrogate and bedside caregivers, discussions with consultants, evaluation of patient's response to treatment, examination of patient, ordering  and performing treatments and interventions, ordering and review of laboratory studies, ordering and review of radiographic studies, pulse oximetry, re-evaluation of patient's condition. This critical care time did not overlap with that of any other provider or involve time for any procedures.     Ethan Schuler MD  Critical Care Medicine  [facility] - Cardiac Medical ICU

## 2023-10-13 NOTE — ASSESSMENT & PLAN NOTE
ECHO 09/23:       Left Ventricle: The left ventricle is normal in size. Increased wall thickness. There is concentric hypertrophy. Mild global hypokinesis present. There is mildly reduced systolic function with a visually estimated ejection fraction of 40 - 45%. There is indeterminate diastolic function.    Right Ventricle: Normal right ventricular cavity size. Right ventricle wall motion  is normal. Systolic function is borderline low.    Left Atrium: Left atrium is severely dilated.    Tricuspid Valve: There is mild regurgitation.    Pericardium: There is a trivial effusion. Echodensity seen adjacent to the visceral pericardium of uncertain significance. This may be pericardial fat although other etiologies not excluded. Correlate clinically.    PASP is at least 46 mmHg.    BNP > 4900    --Volume removal per iHD. Appreciate nephrology assistance   -- Home BP meds discontinued as pressures have been within normal limits. Hydralazine push should pressures rise

## 2023-10-13 NOTE — PLAN OF CARE
10/13/23 1538   Post-Acute Status   Post-Acute Authorization Placement   Post-Acute Placement Status Set-up Complete/Auth obtained   Coverage MEDICARE - MEDICARE PART A & B   Discharge Delays None known at this time   Discharge Plan   Discharge Plan A Return to nursing home   Discharge Plan B Return to Nursing Home     SW sent return to NH referral to [facility] via [facility] as pt is a resident there. Will continue to follow for needs.    Elena Amos, TAMY, CSW    Case Management Department

## 2023-10-13 NOTE — PROGRESS NOTES
Pharmacokinetic Assessment Follow Up: IV Vancomycin    Vancomycin Regimen Assessment/Plan:    - Vancomycin random AM level resulted as 27.6 mcg/mL, goal 15-20 mcg/mL.  - ESRD patient, continue pulse dosing.  HD not performed yesterday as scheduled, did receive HD this morning.  - No additional vancomycin is required today.  - A random level is ordered with AM labs tomorrow.  Pharmacy to dose based on level and RRT plans.     Drug levels (last 3 results):  Recent Labs   Lab Result Units 10/12/23  0437 10/13/23  0326   Vancomycin, Random ug/mL 19.0 27.6         Pharmacy will continue to follow and monitor vancomycin.    Please contact pharmacy at extension [phone] for questions regarding this assessment.    Thank you for the consult,   Gabriela Melara, PharmD, BCCCP       Patient brief summary:  Cosme Lewis is a 58 y.o. male initiated on antimicrobial therapy with IV Vancomycin for treatment of lower respiratory infection    The patient's current regimen is pulse dosing.    Drug Allergies:   Review of patient's allergies indicates:  No Known Allergies    Actual Body Weight:   70 kg    Renal Function:   Estimated Creatinine Clearance: 21.6 mL/min (A) (based on SCr of 3.6 mg/dL (H)).,     Dialysis Method (if applicable):  intermittent HD    CBC (last 72 hours):  Recent Labs   Lab Result Units 10/11/23  0104 10/11/23  0409 10/12/23  0437 10/13/23  0326   WBC K/uL 3.50* 2.81* 8.40 9.99   Hemoglobin g/dL 9.5* 8.2* 8.4* 8.9*   Hemoglobin A1C %  --   --  8.4*  --    Hematocrit % 29.5* 26.3* 26.9* 27.6*   Platelets K/uL 168 145* 100* 97*   Gran % % 67.7 78.2* 78.0* 83.0*   Lymph % % 14.0* 11.4* 5.0* 1.0*   Mono % % 17.7* 9.6 3.0* 0.0*   Eosinophil % % 0.0 0.0 0.0 0.0   Basophil % % 0.3 0.4 0.0 0.0   Differential Method  Automated Automated Manual Manual         Metabolic Panel (last 72 hours):  Recent Labs   Lab Result Units 10/11/23  0104 10/11/23  0409 10/11/23  0535 10/12/23  0437 10/13/23  0326   Sodium mmol/L 132* 131*  132* 129* 127*   Potassium mmol/L 4.2 3.6 3.9 4.4 5.9*   Chloride mmol/L 98 99 96 97 98   CO2 mmol/L 20* 20* 22* 18* 16*   Glucose mg/dL 51* 47* 95 107 165*   BUN mg/dL 38* 40* 41* 27* 44*   Creatinine mg/dL 3.8* 3.5* 3.7* 2.8* 3.6*   Albumin g/dL 3.1* 2.7*  --  2.2* 2.1*   Total Bilirubin mg/dL 0.8 0.8  --  0.7 1.5*   Alkaline Phosphatase U/L 239* 212*  --  146* 130   AST U/L 91* 75*  --  53* 64*   ALT U/L 69* 56*  --  41 37   Magnesium mg/dL  --  2.0  --  2.1 2.4   Phosphorus mg/dL  --  3.9  --  4.4 7.3*         Vancomycin Administrations:  vancomycin given in the last 96 hours                     vancomycin 1,250 mg in dextrose 5 % (D5W) 250 mL IVPB (Vial-Mate) (mg) 1,250 mg New Bag 10/11/23 1614                    Microbiologic Results:  Microbiology Results (last 7 days)       Procedure Component Value Units Date/Time    Blood culture [[id number]] Collected: 10/11/23 1513    Order Status: Completed Specimen: Blood from Line, Jugular, Internal Left Updated: 10/12/23 1622     Blood Culture, Routine No Growth to date      No Growth to date    Blood culture [[id number]] Collected: 10/11/23 0942    Order Status: Completed Specimen: Blood from Peripheral, Upper Arm, Left Updated: 10/12/23 1212     Blood Culture, Routine No Growth to date      No Growth to date           24-Apr-2023 15:40

## 2023-10-13 NOTE — SUBJECTIVE & OBJECTIVE
Interval History/Significant Events: NAEO, VSS, sating 97% on 5L, however concern for airway protection. Patien    Review of Systems   Unable to perform ROS: Acuity of condition     Objective:     Vital Signs (Most Recent):  Temp: 98 °F (36.7 °C) (10/13/23 0800)  Pulse: 61 (10/13/23 0900)  Resp: 20 (10/13/23 0900)  BP: 131/65 (10/13/23 0900)  SpO2: (!) 94 % (10/13/23 0900) Vital Signs (24h Range):  Temp:  [97 °F (36.1 °C)-99.1 °F (37.3 °C)] 98 °F (36.7 °C)  Pulse:  [57-84] 61  Resp:  [11-39] 20  SpO2:  [84 %-100 %] 94 %  BP: ()/(55-95) 131/65   Weight: 70 kg (154 lb 5.2 oz)  Body mass index is 22.79 kg/m².      Intake/Output Summary (Last 24 hours) at 10/13/2023 0915  Last data filed at 10/13/2023 0632  Gross per 24 hour   Intake 1265.32 ml   Output --   Net 1265.32 ml          Physical Exam  Vitals reviewed.   Constitutional:       General: He is not in acute distress.     Appearance: He is ill-appearing.      Comments: Minimally responsive   HENT:      Head: Normocephalic and atraumatic.      Right Ear: External ear normal.      Left Ear: External ear normal.      Nose: Nose normal.   Eyes:      General:         Right eye: No discharge.         Left eye: No discharge.      Pupils: Pupils are equal, round, and reactive to light.   Cardiovascular:      Rate and Rhythm: Normal rate and regular rhythm.   Pulmonary:      Effort: Respiratory distress present.      Breath sounds: Wheezing present.   Abdominal:      General: There is distension.      Tenderness: There is no abdominal tenderness.   Musculoskeletal:         General: No swelling or deformity.   Skin:     General: Skin is warm.      Coloration: Skin is not jaundiced.            Lines/Drains/Airways       Central Venous Catheter Line  Duration             Percutaneous Central Line Insertion/Assessment - Triple Lumen  10/11/23 1501 Internal Jugular Left 1 day              Drain  Duration                  NG/OG Tube 10/12/23 1612 14 Fr. Left nostril <1 day               Peripheral Intravenous Line  Duration                  Hemodialysis AV Fistula Left upper arm -- days         Peripheral IV - Single Lumen 10/11/23 0210 20 G Anterior;Right Saphenous 2 days         Peripheral IV - Single Lumen 10/11/23 0239 20 G Anterior;Right Upper Arm 2 days                  Significant Labs:    CBC/Anemia Profile:  Recent Labs   Lab 10/12/23  0437 10/13/23  0326   WBC 8.40 9.99   HGB 8.4* 8.9*   HCT 26.9* 27.6*   * 97*   MCV 96 94   RDW 16.3* 16.3*        Chemistries:  Recent Labs   Lab 10/12/23  0437 10/13/23  0326   * 127*   K 4.4 5.9*   CL 97 98   CO2 18* 16*   BUN 27* 44*   CREATININE 2.8* 3.6*   CALCIUM 7.1* 7.3*   ALBUMIN 2.2* 2.1*   PROT 5.0* 4.8*   BILITOT 0.7 1.5*   ALKPHOS 146* 130   ALT 41 37   AST 53* 64*   MG 2.1 2.4   PHOS 4.4 7.3*       All pertinent labs within the past 24 hours have been reviewed.    Significant Imaging:  I have reviewed all pertinent imaging results/findings within the past 24 hours.

## 2023-10-13 NOTE — PLAN OF CARE
[facility] - Cardiac Medical ICU  Initial Discharge Assessment    SW called pt's mother, Kassandra Leon, in a re-attempt to complete DPA today; but there was no answer and could not leave . Per chart review, pt is a resident at [facility]. SW spoke w/pt's nurse, Saida, from [facility]. Pt is dependent for ADLs and uses a wheelchair for ambulation. Pt will have assistance from EMS and facility staff upon discharge. All questions addressed. SW will follow for needs.     Primary Care Provider: Eliecer Ohara III, MD    Admission Diagnosis: Shortness of breath [R06.02]  Acute respiratory failure with hypoxia and hypercapnia [J96.01, J96.02]    Admission Date: 10/11/2023  Expected Discharge Date: 10/17/2023         Payor: MEDICARE / Plan: MEDICARE PART A & B / Product Type: Government /     Extended Emergency Contact Information  Primary Emergency Contact: Kassandra Leon  Mobile Phone: [phone]  Relation: Mother    Discharge Plan A: (P) Return to nursing home  Discharge Plan B: (P) Return to Nursing Home      [facility]  [address] [address]  Phone: [phone] Fax: [phone]      Initial Assessment (most recent)       Adult Discharge Assessment - 10/13/23 1506          Discharge Assessment    Assessment Type Discharge Planning Assessment     Source of Information health record;health care advocate (P)    nurse Saida at [facility]    If unable to respond/provide information was family/caregiver contacted? Yes     Contact Name/Number Kassandra Leon (Mother) [phone] / Saida (nurse at [facility]) [phone] (P)      Communicated GERA with patient/caregiver Date not available/Unable to determine     Reason For Admission Chronic hypoxemic respiratory failure     People in Home facility resident     Facility Arrived From: [facility]     Do you expect to return to your current living situation? Yes     Do you  have help at home or someone to help you manage your care at home? Yes     Who are your caregiver(s) and their phone number(s)? Facility staff     Prior to hospitilization cognitive status: Unable to Assess     Current cognitive status: Unable to Assess     Walking or Climbing Stairs ambulation difficulty, dependent     Mobility Management Wheelchair     Dressing/Bathing bathing difficulty, dependent;dressing difficulty, dependent     Dressing/Bathing Management Facility staff     Do you have any problems with: Errands/Grocery   Facility staff assists    Home Accessibility wheelchair accessible     Home Layout Able to live on 1st floor     Equipment Currently Used at Home wheelchair     Readmission within 30 days? Yes     Patient currently being followed by outpatient case management? Unable to determine (comments)     Do you currently have service(s) that help you manage your care at home? Yes     How Many hours does patient receive services 24     Name and Contact number of agency [facility] ([phone])     Is the pt/caregiver preference to resume services with current agency Yes     Do you take prescription medications? Yes     Do you have prescription coverage? Yes     Coverage Medicare Part A & B     Do you have any problems affording any of your prescribed medications? TBD     Who is going to help you get home at discharge? EMS to transport back to NH     How do you get to doctors appointments? agency     Are you on dialysis? Yes     Dialysis Name and Scheduled days [facility]     Do you take coumadin? No     DME Needed Upon Discharge  other (see comments)   TBD    Discharge Plan discussed with: Parent(s);Caregiver (P)      Name(s) and Number(s) Kassandra Leon (Mother) [phone] / Saida (nurse at [facility]) [phone] (P)      Discharge Plan A Return to nursing home (P)      Discharge Plan B Return to Nursing Home (P)         Physical Activity    On average,  how many days per week do you engage in moderate to strenuous exercise (like a brisk walk)? 0 days (P)      On average, how many minutes do you engage in exercise at this level? 0 min (P)         Financial Resource Strain    How hard is it for you to pay for the very basics like food, housing, medical care, and heating? Not hard at all (P)         Housing Stability    In the last 12 months, was there a time when you were not able to pay the mortgage or rent on time? No (P)      In the last 12 months, how many places have you lived? 1 (P)      In the last 12 months, was there a time when you did not have a steady place to sleep or slept in a shelter (including now)? No (P)         Transportation Needs    In the past 12 months, has lack of transportation kept you from medical appointments or from getting medications? No (P)      In the past 12 months, has lack of transportation kept you from meetings, work, or from getting things needed for daily living? No (P)         Food Insecurity    Within the past 12 months, you worried that your food would run out before you got the money to buy more. Never true (P)      Within the past 12 months, the food you bought just didn't last and you didn't have money to get more. Never true (P)         Stress    Do you feel stress - tense, restless, nervous, or anxious, or unable to sleep at night because your mind is troubled all the time - these days? Only a little (P)         Social Connections    In a typical week, how many times do you talk on the phone with family, friends, or neighbors? More than three times a week (P)      How often do you get together with friends or relatives? Never (P)      How often do you attend Scientology or Evangelical services? Never (P)      Do you belong to any clubs or organizations such as Scientology groups, unions, fraternal or athletic groups, or school groups? No (P)      How often do you attend meetings of the clubs or organizations you belong to? Never  (P)         Alcohol Use    Q1: How often do you have a drink containing alcohol? Never (P)      Q2: How many drinks containing alcohol do you have on a typical day when you are drinking? Patient does not drink (P)      Q3: How often do you have six or more drinks on one occasion? Never (P)         OTHER    Name(s) of People in Home Facility staff (P)                      Readmission Assessment (most recent)       Readmission Assessment - 10/13/23 1505          Readmission    Why were you hospitalized in the last 30 days? Acute on chronic respiratory failure with hypoxia     Why were you readmitted? Related to previous admission     When you left the hospital where did you go? Nursing Home     Did patient/caregiver refused recommended DC plan? No     Was this a planned readmission? No                    Elena Amos, TAMY, CSW    Case Management Department

## 2023-10-13 NOTE — PROGRESS NOTES
[facility] - Cardiac Medical ICU  Nephrology  Progress Note    Patient Name: Cosme Lewis  MRN: [id number]  Admission Date: 10/11/2023  Hospital Length of Stay: 2 days  Attending Provider: Victor M Mendez MD   Primary Care Physician: Eliecer Ohara III, MD  Principal Problem:Chronic hypoxemic respiratory failure    Subjective:     HPI: Cosme Lewis is a 59 yo male with PMHx of ESRD on iHD MWF who presents from his NH with AMS, hypoxia, and hypoglycemia.  He was placed on BiPAP and transferred to ICU for higher level of care.  CXR with evidence of pulmonary edema, BNP elevated > 3800.  Labs on chemistry without emergent electrolyte abnormalities consistent with ESRD status.  CBC without leukocytosis.  He was found to be COVID + and started on Broad spectrum Abx and steroids. He required D5W gtt for persistent hypoglycemia.  Nephrology has been consulted for ESRD management while IP.      Interval History:   Extubated overnight, oxygenating well in NAD.  HD was unable to be performed yesterday due to high volume of treatments, seen on HD this morning.  Hyperkalemic at 5.9 on AM labs with HAGMA with bicarb of 16.  Net positive 1.5L/24h.    Review of patient's allergies indicates:  No Known Allergies  Current Facility-Administered Medications   Medication Frequency    acetaminophen tablet 650 mg Q6H PRN    albuterol-ipratropium 2.5 mg-0.5 mg/3 mL nebulizer solution 3 mL Q6H    albuterol-ipratropium 2.5 mg-0.5 mg/3 mL nebulizer solution 3 mL Q6H PRN    ascorbic acid (vitamin C) tablet 500 mg BID    dexAMETHasone injection 6 mg Daily    dexmedetomidine (PRECEDEX) 400mcg/100mL 0.9% NaCL infusion Continuous    dextrose 10% bolus 125 mL 125 mL PRN    dextrose 10% bolus 250 mL 250 mL PRN    dextrose 10% bolus 250 mL 250 mL PRN    doxycycline (VIBRAMYCIN) 100 mg in dextrose 5 % in water (D5W) 100 mL IVPB (MB+) Q12H    famotidine tablet 20 mg Daily    glucagon (human recombinant) injection 1 mg PRN    glucose chewable tablet 16 g  PRN    glucose chewable tablet 24 g PRN    heparin (porcine) injection 5,000 Units Q12H    insulin aspart U-100 pen 0-10 Units Q6H PRN    multivitamin tablet Daily    mupirocin 2 % ointment BID    piperacillin-tazobactam (ZOSYN) 4.5 g in dextrose 5 % in water (D5W) 100 mL IVPB (MB+) Q12H    remdesivir 100 mg in sodium chloride 0.9% 250 mL infusion Daily    sodium chloride 0.9% flush 10 mL PRN    vancomycin - pharmacy to dose pharmacy to manage frequency       Objective:     Vital Signs (Most Recent):  Temp: 98 °F (36.7 °C) (10/13/23 0800)  Pulse: 61 (10/13/23 1015)  Resp: 20 (10/13/23 0930)  BP: 136/68 (10/13/23 1015)  SpO2: 95 % (10/13/23 0930) Vital Signs (24h Range):  Temp:  [97 °F (36.1 °C)-99.1 °F (37.3 °C)] 98 °F (36.7 °C)  Pulse:  [57-84] 61  Resp:  [11-39] 20  SpO2:  [84 %-100 %] 95 %  BP: ()/(55-95) 136/68     Weight: 70 kg (154 lb 5.2 oz) (10/12/23 1322)  Body mass index is 22.79 kg/m².  Body surface area is 1.85 meters squared.    I/O last 3 completed shifts:  In: 2436.5 [I.V.:923; NG/GT:215; IV Piggyback:1298.5]  Out: -      Physical Exam  Vitals and nursing note reviewed.   Constitutional:       General: He is not in acute distress.     Appearance: He is well-developed. He is ill-appearing.      Interventions: Nasal cannula in place.   HENT:      Head: Normocephalic and atraumatic.      Nose: No congestion or rhinorrhea.   Eyes:      General: No scleral icterus.        Right eye: No discharge.         Left eye: No discharge.      Extraocular Movements: Extraocular movements intact.      Conjunctiva/sclera: Conjunctivae normal.   Neck:      Vascular: JVD present.   Cardiovascular:      Rate and Rhythm: Normal rate and regular rhythm.      Heart sounds: No murmur heard.     No friction rub. No gallop.      Comments: UMA GERARDO  Pulmonary:      Effort: No respiratory distress.      Breath sounds: Rhonchi and rales present. No wheezing.   Abdominal:      General: There is no distension.       Palpations: Abdomen is soft.      Tenderness: There is no abdominal tenderness.   Musculoskeletal:         General: No swelling.      Cervical back: Normal range of motion.      Right lower leg: No edema.      Left lower leg: No edema.   Skin:     General: Skin is warm and dry.   Neurological:      Mental Status: He is alert.   Psychiatric:         Behavior: Behavior is uncooperative.          Significant Labs:  CBC:   Recent Labs   Lab 10/13/23  0326   WBC 9.99   RBC 2.94*   HGB 8.9*   HCT 27.6*   PLT 97*   MCV 94   MCH 30.3   MCHC 32.2     CMP:   Recent Labs   Lab 10/13/23  0326   *   CALCIUM 7.3*   ALBUMIN 2.1*   PROT 4.8*   *   K 5.9*   CO2 16*   CL 98   BUN 44*   CREATININE 3.6*   ALKPHOS 130   ALT 37   AST 64*   BILITOT 1.5*          Assessment/Plan:     Renal/  Hyponatremia  Hypervolemic hyponatremia  -Na bath 135     ESRD (end stage renal disease)  ESRD on iHD MWF  [facility] [place]  4 hours  EDW:  71 kg  UMA AVF    Plan/Recommendations:  -HD @ bedside for volume optimization/metabolic clearance  -dialysate bath adjusted to current electrolytes  -UFG 1-2L as tolerated  -continue strict I/O's  -RFP daily  -plan for additional HD session tomorrow      HAGMA  -bicarb bath with HD      Juventino Nguyen NP  Nephrology  [facility] - Cardiac Medical ICU

## 2023-10-14 NOTE — ASSESSMENT & PLAN NOTE
Acute on chronic, contributions include kidney failure and previous oral rehydration with D10/D20 for glucose management. Resolved.

## 2023-10-14 NOTE — PLAN OF CARE
Problem: Adult Inpatient Plan of Care  Goal: Plan of Care Review  Outcome: Ongoing, Progressing  Goal: Patient-Specific Goal (Individualized)  Outcome: Ongoing, Progressing  Goal: Absence of Hospital-Acquired Illness or Injury  Outcome: Ongoing, Progressing  Goal: Optimal Comfort and Wellbeing  Outcome: Ongoing, Progressing  Goal: Readiness for Transition of Care  Outcome: Ongoing, Progressing     Problem: Diabetes Comorbidity  Goal: Blood Glucose Level Within Targeted Range  Outcome: Ongoing, Progressing     Problem: Impaired Wound Healing  Goal: Optimal Wound Healing  Outcome: Ongoing, Progressing     Problem: Device-Related Complication Risk (Hemodialysis)  Goal: Safe, Effective Therapy Delivery  Outcome: Ongoing, Progressing     Problem: Communication Impairment (Mechanical Ventilation, Invasive)  Goal: Effective Communication  Outcome: Ongoing, Progressing     Problem: Device-Related Complication Risk (Mechanical Ventilation, Invasive)  Goal: Optimal Device Function  Outcome: Ongoing, Progressing     Problem: Nutrition Impairment (Mechanical Ventilation, Invasive)  Goal: Optimal Nutrition Delivery  Outcome: Ongoing, Progressing

## 2023-10-14 NOTE — PROGRESS NOTES
Pharmacokinetic Assessment Follow Up: IV Vancomycin    Vancomycin serum concentration assessment/plan:  Random level resulted as 16.7 mcg/mL; Goal 10-20 mcg/mL, Pneumonia  ESRD on HD. Receiving HD today per nephrology  Re-dose Vancomycin 500 mg IV x 1; continue to dose by level when the random is less than 20 mcg/mL. Scheduled dose for after HD (2100)   Next level to be drawn on 10/15 with AM labs        Drug levels (last 3 results):  Recent Labs   Lab Result Units 10/12/23  0437 10/13/23  0326 10/14/23  0400   Vancomycin, Random ug/mL 19.0 27.6 16.7       Pharmacy will continue to follow and monitor vancomycin.    Please contact pharmacy at extension [phone] for questions regarding this assessment.    Thank you for the consult,   Remedios Chavez       Patient brief summary:  Cosme Lewis is a 58 y.o. male initiated on antimicrobial therapy with IV Vancomycin for treatment of lower respiratory infection    The patient's current regimen is pulse dosing     Drug Allergies:   Review of patient's allergies indicates:  No Known Allergies    Actual Body Weight:   70.2 kg     Renal Function:   Estimated Creatinine Clearance: 26.9 mL/min (A) (based on SCr of 2.9 mg/dL (H)).,     Dialysis Method (if applicable):  intermittent HD    CBC (last 72 hours):  Recent Labs   Lab Result Units 10/12/23  0437 10/13/23  0326 10/14/23  0400   WBC K/uL 8.40 9.99 7.77   Hemoglobin g/dL 8.4* 8.9* 9.0*   Hemoglobin A1C % 8.4*  --   --    Hematocrit % 26.9* 27.6* 26.9*   Platelets K/uL 100* 97* 84*   Gran % % 78.0* 83.0* 94.3*   Lymph % % 5.0* 1.0* 2.3*   Mono % % 3.0* 0.0* 2.8*   Eosinophil % % 0.0 0.0 0.0   Basophil % % 0.0 0.0 0.1   Differential Method  Manual Manual Automated       Metabolic Panel (last 72 hours):  Recent Labs   Lab Result Units 10/12/23  0437 10/13/23  0326 10/13/23  1246 10/14/23  0400   Sodium mmol/L 129* 127* 133* 133*   Potassium mmol/L 4.4 5.9* 4.1 4.7   Chloride mmol/L 97 98 94* 93*   CO2 mmol/L 18* 16* 25 19*    Glucose mg/dL 107 165* 116* 197*   BUN mg/dL 27* 44* 27* 45*   Creatinine mg/dL 2.8* 3.6* 2.3* 2.9*   Albumin g/dL 2.2* 2.1*  --  2.4*   Total Bilirubin mg/dL 0.7 1.5*  --  2.3*   Alkaline Phosphatase U/L 146* 130  --  135   AST U/L 53* 64*  --  67*   ALT U/L 41 37  --  44   Magnesium mg/dL 2.1 2.4  --  2.2   Phosphorus mg/dL 4.4 7.3*  --  6.3*       Vancomycin Administrations:  vancomycin given in the last 96 hours                     vancomycin (VANCOCIN) 500 mg in dextrose 5 % in water (D5W) 100 mL IVPB (MB+) (mg) 500 mg New Bag 10/12/23 2215    vancomycin 1,250 mg in dextrose 5 % (D5W) 250 mL IVPB (Vial-Mate) (mg) 1,250 mg New Bag 10/11/23 1614                    Microbiologic Results:  Microbiology Results (last 7 days)       Procedure Component Value Units Date/Time    MRSA Screen by PCR [[id number]]     Order Status: No result Specimen: Nasopharyngeal Swab from Nasal     Blood culture [[id number]] Collected: 10/11/23 0942    Order Status: Completed Specimen: Blood from Peripheral, Upper Arm, Left Updated: 10/14/23 1212     Blood Culture, Routine No Growth to date      No Growth to date      No Growth to date      No Growth to date    Blood culture [[id number]] Collected: 10/11/23 1513    Order Status: Completed Specimen: Blood from Line, Jugular, Internal Left Updated: 10/13/23 1622     Blood Culture, Routine No Growth to date      No Growth to date      No Growth to date

## 2023-10-14 NOTE — PROGRESS NOTES
[facility]  Nephrology  Progress Note    Patient Name: Cosme Lewis  MRN: [id number]  Admission Date: 10/11/2023  Hospital Length of Stay: 3 days  Attending Provider: Victor M Mendez MD   Primary Care Physician: Eliecer Ohara III, MD  Principal Problem:Chronic hypoxemic respiratory failure    Subjective:     HPI: Cosme Lewis is a 59 yo male with PMHx of ESRD on iHD MWF who presents from his NH with AMS, hypoxia, and hypoglycemia.  He was placed on BiPAP and transferred to ICU for higher level of care.  CXR with evidence of pulmonary edema, BNP elevated > 3800.  Labs on chemistry without emergent electrolyte abnormalities consistent with ESRD status.  CBC without leukocytosis.  He was found to be COVID + and started on Broad spectrum Abx and steroids. He required D5W gtt for persistent hypoglycemia.  Nephrology has been consulted for ESRD management while IP.      Interval History: no acute events overnight    Review of patient's allergies indicates:  No Known Allergies  Current Facility-Administered Medications   Medication Frequency    0.9%  NaCl infusion Once    acetaminophen tablet 650 mg Q6H PRN    albuterol-ipratropium 2.5 mg-0.5 mg/3 mL nebulizer solution 3 mL Q6H PRN    albuterol-ipratropium 2.5 mg-0.5 mg/3 mL nebulizer solution 3 mL Q4H    ascorbic acid (vitamin C) tablet 500 mg BID    dexAMETHasone injection 6 mg Daily    dexmedetomidine (PRECEDEX) 400mcg/100mL 0.9% NaCL infusion Continuous    dextrose 10% bolus 125 mL 125 mL PRN    dextrose 10% bolus 250 mL 250 mL PRN    dextrose 10% bolus 250 mL 250 mL PRN    glucagon (human recombinant) injection 1 mg PRN    glucose chewable tablet 16 g PRN    glucose chewable tablet 24 g PRN    heparin (porcine) injection 5,000 Units Q12H    insulin aspart U-100 pen 0-10 Units Q6H PRN    multivitamin tablet Daily    mupirocin 2 % ointment BID    piperacillin-tazobactam (ZOSYN) 4.5 g in dextrose 5 % in water (D5W) 100 mL IVPB (MB+) Q12H     remdesivir 100 mg in sodium chloride 0.9% 250 mL infusion Daily    sodium chloride 0.9% bolus 250 mL 250 mL PRN    sodium chloride 0.9% flush 10 mL PRN    vancomycin - pharmacy to dose pharmacy to manage frequency       Objective:     Vital Signs (Most Recent):  Temp: 98.4 °F (36.9 °C) (10/13/23 2300)  Pulse: 81 (10/14/23 1519)  Resp: 18 (10/14/23 1519)  BP: (!) 173/95 (10/14/23 1100)  SpO2: (!) 94 % (10/14/23 1519) Vital Signs (24h Range):  Temp:  [96.1 °F (35.6 °C)-98.4 °F (36.9 °C)] 98.4 °F (36.9 °C)  Pulse:  [] 81  Resp:  [12-36] 18  SpO2:  [77 %-100 %] 94 %  BP: (128-189)/(70-98) 173/95     Weight: 70.2 kg (154 lb 12.2 oz) (10/14/23 0400)  Body mass index is 22.85 kg/m².  Body surface area is 1.85 meters squared.    I/O last 3 completed shifts:  In: 1669.7 [I.V.:217; NG/GT:110; IV Piggyback:1342.8]  Out: 2500 [Other:2500]     Physical Exam  Vitals and nursing note reviewed.   Constitutional:       General: He is not in acute distress.     Appearance: He is well-developed. He is ill-appearing.      Interventions: Nasal cannula in place.   HENT:      Head: Normocephalic and atraumatic.      Nose: No congestion or rhinorrhea.   Eyes:      General: No scleral icterus.        Right eye: No discharge.         Left eye: No discharge.      Extraocular Movements: Extraocular movements intact.      Conjunctiva/sclera: Conjunctivae normal.   Neck:      Vascular: JVD present.   Cardiovascular:      Rate and Rhythm: Normal rate and regular rhythm.      Heart sounds: No murmur heard.     No friction rub. No gallop.      Comments: UMA AVF  Pulmonary:      Effort: No respiratory distress.      Breath sounds: Rhonchi and rales present. No wheezing.   Abdominal:      General: There is no distension.      Palpations: Abdomen is soft.      Tenderness: There is no abdominal tenderness.   Musculoskeletal:         General: No swelling.      Cervical back: Normal range of motion.      Right lower leg: No edema.      Left  lower leg: No edema.   Skin:     General: Skin is warm and dry.   Neurological:      Mental Status: He is alert.   Psychiatric:         Behavior: Behavior is uncooperative.          Significant Labs:  All labs within the past 24 hours have been reviewed.     Significant Imaging:  Labs: Reviewed    Assessment/Plan:     Renal/  Hyponatremia  -    ESRD (end stage renal disease)  ESRD on iHD MWF  [facility]  4 hours  EDW:  71 kg  UMA AVF    Plan/Recommendations:  -HD ordered for today  -dialysate bath adjusted to current electrolytes  -continue strict I/O's  -RFP daily      Oncology  Anemia in ESRD (end-stage renal disease)  Below goal for ESRD  -if here greater than 72 hours, will consider starting on MARCIA therapy          Thank you for your consult. I will follow-up with patient. Please contact us if you have any additional questions.    Otf Mcknight MD  Nephrology  [facility]

## 2023-10-14 NOTE — NURSING
"      CMICU PLAN OF CARE NOTE      Dx: Chronic hypoxemic respiratory failure    Vital Signs: BP (!) 173/95   Pulse 81   Temp 98.4 °F (36.9 °C) (Oral)   Resp 18   Ht 5' 9" (1.753 m)   Wt 70.2 kg (154 lb 12.2 oz)   SpO2 (!) 94%   BMI 22.85 kg/m²     Neuro: Alert, Confused, Follows Commands and Moves All Extremities    Respiratory: Airvo 20L/60%    Cardiac: SR. SBP < 180, MAP > 65    Diet: Cardiac Diet and Diabetic Diet    Gtts: None    Urine Output: Anuric, HD    Drains: None     Labs/Accuchecks: Daily labs, accuchecks AC/HS & PRN    Skin precautions maintained including:  Sacrum and heels with foam dressing in place for pressure protection. Frequent weight shift encouraged / assistance provided Q2 hr to prevent further breakdown. Bed plugged in and mattress inflated; continuous rotational turning bed feature / [employer] bed utilized. Adhesive use limited. Heels elevated off bed. Positioned off wounds. Pressure points protected and positioning supports utilized.  Skin-to-device areas padded. Skin-to-skin areas padded.  "

## 2023-10-14 NOTE — PROGRESS NOTES
[facility] - [facility]  Critical Care Medicine  Progress Note    Patient Name: Cosme Lewis  MRN: [id number]  Admission Date: 10/11/2023  Hospital Length of Stay: 3 days  Code Status: Full Code  Attending Provider: Victor M Mendez MD  Primary Care Provider: Eliecer Ohara III, MD   Principal Problem: Chronic hypoxemic respiratory failure    Subjective:     HPI:  Mr. Lewis is a 58 year old male with PMH notable for COPD, HFrEF (45% and DD), ESRD on dialysis, HTN, DM presents via EMS from nursing home for altered mental status and hypoxia.  Per EMS, patient was found to have glucose of 38, he received D50 with improvement in his glucose.  He also had SpO2 58% which improved with 15 L nasal cannula.     ED work up revealed VBG 7.27/61 and he was subsequently placed on bipap for work of breathing. He was persistently hypoglycemic with BG 59 --> 34 despite IV dextrose, therefore he was started on a continuous dextrose infusion. Other lab work up revealed WBC 3, stable, H//H 9.5/29, Na 143, CO2 20, Cr/BUN 3.8/38, elevated AST/ALT/alk phos, and troponin 0.174 with no ischemic changes on EKG. He was given IV rocephin and doxycyline. Chest XR with bilateral opacifications R > L.    Critical care consulted for acute hypoxemic respiratory failure and NIPPV      Hospital/ICU Course:  In the MICU, the patient arrived on bipap but was found to still be hypoxic which initially improved after adjusting his bipap settings. He continued to be altered and tried occasionally became combative requiring precedex which was affective when maxed out at 1.4 mcg/kg/hour. During this time he was also found to have worsening hypoglycemia which required continuous D10 administration at a max rate of 200 cc/hour. Eventually his oxygen status started declining and the decision was made to intubate the patient. It is believed that worsening of his oxygen status was in part due the large volume of D10 he received to maintain his blood sugars. For this  reason, it was decided that D20 at a slower rate would be more appropriate, and for this a central line was placed. Additionally, he received a round of hemodialysis. He is receiving remdesivir for COVID and vacomycin/zosyn/doxycycline for community acquired pneumonia coverage/concerning chest x-ray. The patient's sugars started rising on the D20 and eventually he started becoming hyperglycemic  to the 220s which led to the D20 being stopped for the time being and since that time the sugars have been relatively normoglycemic. His abdomen was found to be slightly more distended but KUB was negative for bowel obstruction, likely suggesting ascites as the source.    On 10/12 after passing SAT and SBT, the decision was made to extubate the patient which was done successfully. All sedatives were turned off at this time as well. He was placed on high flow at the time and has been sating well.     He continued to receive antibiotics as well as antivirals for CAP coverage/COVID respectively which was able to help improve his likely pneumonia vs ARDS.         Interval History/Significant Events: No significant events overnight, he remains on the airvo high flow. Patient states he is hungry and would like to eat food.     Review of Systems   Unable to perform ROS: Acuity of condition     Objective:     Vital Signs (Most Recent):  Temp: 98.4 °F (36.9 °C) (10/13/23 2300)  Pulse: 81 (10/14/23 0815)  Resp: 20 (10/14/23 0815)  BP: (!) 159/79 (10/14/23 0700)  SpO2: 95 % (10/14/23 0815) Vital Signs (24h Range):  Temp:  [96.1 °F (35.6 °C)-98.4 °F (36.9 °C)] 98.4 °F (36.9 °C)  Pulse:  [] 81  Resp:  [11-36] 20  SpO2:  [77 %-100 %] 95 %  BP: (126-189)/(65-98) 159/79   Weight: 70.2 kg (154 lb 12.2 oz)  Body mass index is 22.85 kg/m².      Intake/Output Summary (Last 24 hours) at 10/14/2023 0935  Last data filed at 10/14/2023 0540  Gross per 24 hour   Intake 647.42 ml   Output 2500 ml   Net -1852.58 ml          Physical Exam  Vitals  reviewed.   Constitutional:       General: He is not in acute distress.     Appearance: He is ill-appearing.      Comments: Increased responsiveness and able to communicate/formulate coherent words   HENT:      Head: Normocephalic and atraumatic.      Right Ear: External ear normal.      Left Ear: External ear normal.      Nose: Nose normal.   Eyes:      General:         Right eye: No discharge.         Left eye: No discharge.      Pupils: Pupils are equal, round, and reactive to light.   Cardiovascular:      Rate and Rhythm: Normal rate and regular rhythm.   Pulmonary:      Effort: Respiratory distress present.      Breath sounds: No wheezing.   Abdominal:      General: There is distension.      Tenderness: There is no abdominal tenderness.      Comments: Some degree of distension still present, however improved from previous days   Musculoskeletal:         General: No swelling or deformity.   Skin:     General: Skin is warm.      Coloration: Skin is not jaundiced.            Vents:  Vent Mode: Spont (10/12/23 1503)  Set Rate: 20 BPM (10/12/23 1141)  Vt Set: 380 mL (10/12/23 1141)  Pressure Support: 5 cmH20 (10/12/23 1503)  PEEP/CPAP: 5 cmH20 (10/12/23 1503)  Oxygen Concentration (%): 40 (10/14/23 0815)  Peak Airway Pressure: 11 cmH20 (10/12/23 1503)  Plateau Pressure: 20 cmH20 (10/12/23 1503)  Total Ve: 12.8 L/m (10/12/23 1503)  Negative Inspiratory Force (cm H2O): 0 (10/12/23 1503)  F/VT Ratio<105 (RSBI): (!) 97.83 (10/12/23 1503)  Lines/Drains/Airways       Central Venous Catheter Line  Duration             Percutaneous Central Line Insertion/Assessment - Triple Lumen  10/11/23 1501 Internal Jugular Left 2 days              Drain  Duration                  NG/OG Tube 10/12/23 1612 14 Fr. Left nostril 1 day              Peripheral Intravenous Line  Duration                  Hemodialysis AV Fistula Left upper arm -- days         Peripheral IV - Single Lumen 10/11/23 0210 20 G Anterior;Right Saphenous 3 days          Peripheral IV - Single Lumen 10/11/23 0239 20 G Anterior;Right Upper Arm 3 days                  Significant Labs:    CBC/Anemia Profile:  Recent Labs   Lab 10/13/23  0326 10/14/23  0400   WBC 9.99 7.77   HGB 8.9* 9.0*   HCT 27.6* 26.9*   PLT 97* 84*   MCV 94 90   RDW 16.3* 16.1*        Chemistries:  Recent Labs   Lab 10/13/23  0326 10/13/23  1246 10/14/23  0400   * 133* 133*   K 5.9* 4.1 4.7   CL 98 94* 93*   CO2 16* 25 19*   BUN 44* 27* 45*   CREATININE 3.6* 2.3* 2.9*   CALCIUM 7.3* 7.7* 7.2*   ALBUMIN 2.1*  --  2.4*   PROT 4.8*  --  5.2*   BILITOT 1.5*  --  2.3*   ALKPHOS 130  --  135   ALT 37  --  44   AST 64*  --  67*   MG 2.4  --  2.2   PHOS 7.3*  --  6.3*       All pertinent labs within the past 24 hours have been reviewed.    Significant Imaging:  I have reviewed all pertinent imaging results/findings within the past 24 hours.      ABG  Recent Labs   Lab 10/13/23  0839   PH 7.272*   PO2 29*   PCO2 52.0*   HCO3 24.0   BE -3     Assessment/Plan:     Pulmonary  * Chronic hypoxemic respiratory failure  Patient with Hypercapnic and Hypoxic Respiratory failure which is Chronic.  he is not on home oxygen. Supplemental oxygen was provided and noted-  Signs/symptoms of respiratory failure include- tachypnea, increased work of breathing and use of accessory muscles. Contributing diagnoses includes - CHF, COPD and fluid volume excess Labs and images were reviewed. Patient Has recent ABG, which has been reviewed. Will treat underlying causes and adjust management of respiratory failure as follows-     58 year old male with multiple medical problems who presented to [facility] ED via EMS for AMS, hypoglycemia, and hypoxia placed on NIPPV. Per chart review patient with room air saturation in 50s which improved with HFNC, however, he remained tachypnic with accessory muscle use, therefore he was placed on NIPPV. Unclear if patient requires oxygen at baseline. Previous hospitalizations with oxygen use. CXR with bilateral  opacifications R > L and BNP elevated > 4900.     Intubated and mechanically ventilated, extubated to NC 10/13, sating well  Chest x-ray concerning for pneumonia vs ARDS    - Monitoring ABGs PRN  - Remdesivir and for dexamethasone 6 mg daily for COVID.  - CAP coverage with Vanc/Zosyn and Doxycycline (day 4).  - Received HD (10/11, 10/12, 10/13), Nephrology following and recommends HD again today    Acute respiratory failure with hypoxia and hypercapnia  Patient with Hypoxic Respiratory failure which is Acute.  he is not on home oxygen. Supplemental oxygen was provided and noted.  Signs/symptoms of respiratory failure include- respiratory distress. Contributing diagnoses includes - ARDS Labs and images were reviewed. Patient Has recent ABG, which has been reviewed. Will treat underlying causes and adjust management of respiratory failure as follows- continuing antibiotics and antivirals, adjustments of mechanical ventilation, HD and fluid regulation.     Extubated to 5L NC morning of 10/13. In no acute distress  VBG 7.2552  However mental status improving    -continue to monitor on NC  -ABG as warranted  -holding off on Bipap for now as patient mentation is improved since blood gas    COPD (chronic obstructive pulmonary disease)  Duo nebs Q4wake    Cardiac/Vascular  HLD (hyperlipidemia)  -- Hold statin in the setting of elevated LFTs   -- CMP daily. Will resume Atorvastatin when LFTs improve    Chronic diastolic heart failure  ECHO 09/23:       Left Ventricle: The left ventricle is normal in size. Increased wall thickness. There is concentric hypertrophy. Mild global hypokinesis present. There is mildly reduced systolic function with a visually estimated ejection fraction of 40 - 45%. There is indeterminate diastolic function.    Right Ventricle: Normal right ventricular cavity size. Right ventricle wall motion  is normal. Systolic function is borderline low.    Left Atrium: Left atrium is severely dilated.     Tricuspid Valve: There is mild regurgitation.    Pericardium: There is a trivial effusion. Echodensity seen adjacent to the visceral pericardium of uncertain significance. This may be pericardial fat although other etiologies not excluded. Correlate clinically.    PASP is at least 46 mmHg.    BNP > 4900    --Volume removal per iHD. Appreciate nephrology assistance   -- Home BP meds discontinued as pressures have been within normal limits. Hydralazine push should pressures rise     Renal/  Hyponatremia  Acute on chronic, contributions include kidney failure and previous oral rehydration with D10/D20 for glucose management. Resolved.    ESRD (end stage renal disease)  Outpatient HD [facility]: [facility]   HD tx days: MWF   HD tx time: 4hrs   HD access: LUE AVF   HD modality: iHD   Residual urine: Anuric   Estimated Creatinine Clearance: 33.8 mL/min (A) (based on SCr of 2.3 mg/dL (H)).    Toelrating iHD well    -- Nephrology consulted for iHD management. Appreciate their assistance   -- CMP daily and trend   -- Avoid nephrotoxins   -- Renally dose all medications to appropriate GFR / CrCl   -- Hgb > 7 and MAP > 65 goals     Hematology  History of pulmonary embolism  Diagnosed 10/2022  Repeat CT 3/2023 with resolution    -hold home eliquis for mental status change  -VTE ppx with heparin    Oncology  Anemia in ESRD (end-stage renal disease)  Recent Labs   Lab 10/14/23  0400   WBC 7.77   RBC 2.99*   HGB 9.0*   HCT 26.9*   PLT 84*   MCV 90   MCH 30.1   MCHC 33.5     -- CBC daily and trend   -- Transfuse for hgb < 7   -- Receives EPO with iHD    Endocrine  Hyperglycemia due to type 2 diabetes mellitus  See hypoglycemia       Critical Care Daily Checklist:    A: Awake: RASS Goal/Actual Goal: RASS Goal: 0-->alert and calm  Actual:     B: Spontaneous Breathing Trial Performed? Spon. Breathing Trial Initiated?: Not initiated (10/12/23 0742)   C: SAT & SBT Coordinated?  Passed                      D: Delirium: CAM-ICU      E: Early Mobility Performed? No   F: Feeding Goal: Goals: Meet % EEN, EPN by RD f/u date  Status: Nutrition Goal Status: new   Current Diet Order   Procedures    Diet NPO      AS: Analgesia/Sedation None   T: Thromboembolic Prophylaxis Hepain   H: HOB > 300 Yes   U: Stress Ulcer Prophylaxis (if needed) N/A   G: Glucose Control SSI and Determir   B: Bowel Function     I: Indwelling Catheter (Lines & Dutta) Necessity L-IJ, PIV   D: De-escalation of Antimicrobials/Pharmacotherapies Discontinuing doxycycline     Plan for the day/ETD Weaning down high flow, monitoring BS    Code Status:  Family/Goals of Care: Full Code         Critical secondary to Patient has a condition that poses threat to life and bodily function: Acute respiratory distress     Critical care was time spent personally by me on the following activities: development of treatment plan with patient or surrogate and bedside caregivers, discussions with consultants, evaluation of patient's response to treatment, examination of patient, ordering and performing treatments and interventions, ordering and review of laboratory studies, ordering and review of radiographic studies, pulse oximetry, re-evaluation of patient's condition. This critical care time did not overlap with that of any other provider or involve time for any procedures.     Robbie Fields MD  Critical Care Medicine  [facility] - Cardiac Medical ICU

## 2023-10-14 NOTE — ASSESSMENT & PLAN NOTE
Patient with Hypercapnic and Hypoxic Respiratory failure which is Chronic.  he is not on home oxygen. Supplemental oxygen was provided and noted-  Signs/symptoms of respiratory failure include- tachypnea, increased work of breathing and use of accessory muscles. Contributing diagnoses includes - CHF, COPD and fluid volume excess Labs and images were reviewed. Patient Has recent ABG, which has been reviewed. Will treat underlying causes and adjust management of respiratory failure as follows-     58 year old male with multiple medical problems who presented to [facility] ED via EMS for AMS, hypoglycemia, and hypoxia placed on NIPPV. Per chart review patient with room air saturation in 50s which improved with HFNC, however, he remained tachypnic with accessory muscle use, therefore he was placed on NIPPV. Unclear if patient requires oxygen at baseline. Previous hospitalizations with oxygen use. CXR with bilateral opacifications R > L and BNP elevated > 4900.     Intubated and mechanically ventilated, extubated to NC 10/13, sating well  Chest x-ray concerning for pneumonia vs ARDS    - Monitoring ABGs PRN  - Remdesivir and for dexamethasone 6 mg daily for COVID.  - CAP coverage with Vanc/Zosyn and Doxycycline (day 4).  - Received HD (10/11, 10/12, 10/13), Nephrology following and recommends HD again today

## 2023-10-14 NOTE — ASSESSMENT & PLAN NOTE
Recent Labs   Lab 10/14/23  0400   WBC 7.77   RBC 2.99*   HGB 9.0*   HCT 26.9*   PLT 84*   MCV 90   MCH 30.1   MCHC 33.5     -- CBC daily and trend   -- Transfuse for hgb < 7   -- Receives EPO with iHD

## 2023-10-14 NOTE — SUBJECTIVE & OBJECTIVE
Interval History/Significant Events: No significant events overnight, he remains on the airvo high flow. Patient states he is hungry and would like to eat food.     Review of Systems   Unable to perform ROS: Acuity of condition     Objective:     Vital Signs (Most Recent):  Temp: 98.4 °F (36.9 °C) (10/13/23 2300)  Pulse: 81 (10/14/23 0815)  Resp: 20 (10/14/23 0815)  BP: (!) 159/79 (10/14/23 0700)  SpO2: 95 % (10/14/23 0815) Vital Signs (24h Range):  Temp:  [96.1 °F (35.6 °C)-98.4 °F (36.9 °C)] 98.4 °F (36.9 °C)  Pulse:  [] 81  Resp:  [11-36] 20  SpO2:  [77 %-100 %] 95 %  BP: (126-189)/(65-98) 159/79   Weight: 70.2 kg (154 lb 12.2 oz)  Body mass index is 22.85 kg/m².      Intake/Output Summary (Last 24 hours) at 10/14/2023 0907  Last data filed at 10/14/2023 0540  Gross per 24 hour   Intake 647.42 ml   Output 2500 ml   Net -1852.58 ml          Physical Exam  Vitals reviewed.   Constitutional:       General: He is not in acute distress.     Appearance: He is ill-appearing.      Comments: Increased responsiveness and able to communicate/formulate coherent words   HENT:      Head: Normocephalic and atraumatic.      Right Ear: External ear normal.      Left Ear: External ear normal.      Nose: Nose normal.   Eyes:      General:         Right eye: No discharge.         Left eye: No discharge.      Pupils: Pupils are equal, round, and reactive to light.   Cardiovascular:      Rate and Rhythm: Normal rate and regular rhythm.   Pulmonary:      Effort: Respiratory distress present.      Breath sounds: No wheezing.   Abdominal:      General: There is distension.      Tenderness: There is no abdominal tenderness.      Comments: Some degree of distension still present, however improved from previous days   Musculoskeletal:         General: No swelling or deformity.   Skin:     General: Skin is warm.      Coloration: Skin is not jaundiced.            Vents:  Vent Mode: Spont (10/12/23 1503)  Set Rate: 20 BPM (10/12/23  1141)  Vt Set: 380 mL (10/12/23 1141)  Pressure Support: 5 cmH20 (10/12/23 1503)  PEEP/CPAP: 5 cmH20 (10/12/23 1503)  Oxygen Concentration (%): 40 (10/14/23 0815)  Peak Airway Pressure: 11 cmH20 (10/12/23 1503)  Plateau Pressure: 20 cmH20 (10/12/23 1503)  Total Ve: 12.8 L/m (10/12/23 1503)  Negative Inspiratory Force (cm H2O): 0 (10/12/23 1503)  F/VT Ratio<105 (RSBI): (!) 97.83 (10/12/23 1503)  Lines/Drains/Airways       Central Venous Catheter Line  Duration             Percutaneous Central Line Insertion/Assessment - Triple Lumen  10/11/23 1501 Internal Jugular Left 2 days              Drain  Duration                  NG/OG Tube 10/12/23 1612 14 Fr. Left nostril 1 day              Peripheral Intravenous Line  Duration                  Hemodialysis AV Fistula Left upper arm -- days         Peripheral IV - Single Lumen 10/11/23 0210 20 G Anterior;Right Saphenous 3 days         Peripheral IV - Single Lumen 10/11/23 0239 20 G Anterior;Right Upper Arm 3 days                  Significant Labs:    CBC/Anemia Profile:  Recent Labs   Lab 10/13/23  0326 10/14/23  0400   WBC 9.99 7.77   HGB 8.9* 9.0*   HCT 27.6* 26.9*   PLT 97* 84*   MCV 94 90   RDW 16.3* 16.1*        Chemistries:  Recent Labs   Lab 10/13/23  0326 10/13/23  1246 10/14/23  0400   * 133* 133*   K 5.9* 4.1 4.7   CL 98 94* 93*   CO2 16* 25 19*   BUN 44* 27* 45*   CREATININE 3.6* 2.3* 2.9*   CALCIUM 7.3* 7.7* 7.2*   ALBUMIN 2.1*  --  2.4*   PROT 4.8*  --  5.2*   BILITOT 1.5*  --  2.3*   ALKPHOS 130  --  135   ALT 37  --  44   AST 64*  --  67*   MG 2.4  --  2.2   PHOS 7.3*  --  6.3*       All pertinent labs within the past 24 hours have been reviewed.    Significant Imaging:  I have reviewed all pertinent imaging results/findings within the past 24 hours.

## 2023-10-14 NOTE — SUBJECTIVE & OBJECTIVE
Interval History: no acute events overnight    Review of patient's allergies indicates:  No Known Allergies  Current Facility-Administered Medications   Medication Frequency    0.9%  NaCl infusion Once    acetaminophen tablet 650 mg Q6H PRN    albuterol-ipratropium 2.5 mg-0.5 mg/3 mL nebulizer solution 3 mL Q6H PRN    albuterol-ipratropium 2.5 mg-0.5 mg/3 mL nebulizer solution 3 mL Q4H    ascorbic acid (vitamin C) tablet 500 mg BID    dexAMETHasone injection 6 mg Daily    dexmedetomidine (PRECEDEX) 400mcg/100mL 0.9% NaCL infusion Continuous    dextrose 10% bolus 125 mL 125 mL PRN    dextrose 10% bolus 250 mL 250 mL PRN    dextrose 10% bolus 250 mL 250 mL PRN    glucagon (human recombinant) injection 1 mg PRN    glucose chewable tablet 16 g PRN    glucose chewable tablet 24 g PRN    heparin (porcine) injection 5,000 Units Q12H    insulin aspart U-100 pen 0-10 Units Q6H PRN    multivitamin tablet Daily    mupirocin 2 % ointment BID    piperacillin-tazobactam (ZOSYN) 4.5 g in dextrose 5 % in water (D5W) 100 mL IVPB (MB+) Q12H    remdesivir 100 mg in sodium chloride 0.9% 250 mL infusion Daily    sodium chloride 0.9% bolus 250 mL 250 mL PRN    sodium chloride 0.9% flush 10 mL PRN    vancomycin - pharmacy to dose pharmacy to manage frequency       Objective:     Vital Signs (Most Recent):  Temp: 98.4 °F (36.9 °C) (10/13/23 2300)  Pulse: 81 (10/14/23 1519)  Resp: 18 (10/14/23 1519)  BP: (!) 173/95 (10/14/23 1100)  SpO2: (!) 94 % (10/14/23 1519) Vital Signs (24h Range):  Temp:  [96.1 °F (35.6 °C)-98.4 °F (36.9 °C)] 98.4 °F (36.9 °C)  Pulse:  [] 81  Resp:  [12-36] 18  SpO2:  [77 %-100 %] 94 %  BP: (128-189)/(70-98) 173/95     Weight: 70.2 kg (154 lb 12.2 oz) (10/14/23 0400)  Body mass index is 22.85 kg/m².  Body surface area is 1.85 meters squared.    I/O last 3 completed shifts:  In: 1669.7 [I.V.:217; NG/GT:110; IV Piggyback:1342.8]  Out: 2500 [Other:2500]     Physical Exam  Vitals and nursing note reviewed.    Constitutional:       General: He is not in acute distress.     Appearance: He is well-developed. He is ill-appearing.      Interventions: Nasal cannula in place.   HENT:      Head: Normocephalic and atraumatic.      Nose: No congestion or rhinorrhea.   Eyes:      General: No scleral icterus.        Right eye: No discharge.         Left eye: No discharge.      Extraocular Movements: Extraocular movements intact.      Conjunctiva/sclera: Conjunctivae normal.   Neck:      Vascular: JVD present.   Cardiovascular:      Rate and Rhythm: Normal rate and regular rhythm.      Heart sounds: No murmur heard.     No friction rub. No gallop.      Comments: UMA GERARDO  Pulmonary:      Effort: No respiratory distress.      Breath sounds: Rhonchi and rales present. No wheezing.   Abdominal:      General: There is no distension.      Palpations: Abdomen is soft.      Tenderness: There is no abdominal tenderness.   Musculoskeletal:         General: No swelling.      Cervical back: Normal range of motion.      Right lower leg: No edema.      Left lower leg: No edema.   Skin:     General: Skin is warm and dry.   Neurological:      Mental Status: He is alert.   Psychiatric:         Behavior: Behavior is uncooperative.          Significant Labs:  All labs within the past 24 hours have been reviewed.     Significant Imaging:  Labs: Reviewed

## 2023-10-14 NOTE — ASSESSMENT & PLAN NOTE
ESRD on iHD MWF  [facility]  4 hours  EDW:  71 kg  UMA AVF    Plan/Recommendations:  -HD ordered for today  -dialysate bath adjusted to current electrolytes  -continue strict I/O's  -RFP daily

## 2023-10-15 NOTE — PROGRESS NOTES
HD tx completed without any complications.  Manual pressure held for 20 minutes until hemostasis was achieved; dressing dry and intact; no oozing or bleeding noted.  Net UF: 2L per pt request  Report given to primary RN.

## 2023-10-15 NOTE — ASSESSMENT & PLAN NOTE
Patient with Hypoxic Respiratory failure which is Acute.  he is not on home oxygen. Supplemental oxygen was provided and noted.  Signs/symptoms of respiratory failure include- respiratory distress. Contributing diagnoses includes - ARDS Labs and images were reviewed. Patient Has recent ABG, which has been reviewed. Will treat underlying causes and adjust management of respiratory failure as follows- continuing antibiotics and antivirals, adjustments of mechanical ventilation, HD and fluid regulation.     Extubated to 5L NC morning of 10/13. In no acute distress  VBG 7.2552  However mental status improving    -continue to monitor on NC  -ABG as warranted  -Currently on bipap

## 2023-10-15 NOTE — PROGRESS NOTES
NEPHROLOGY HEMODIALYSIS NOTE    Cosme Lewis is a 58 y.o. male currently on hemodialysis for removal of uremic toxins and volume management.     Patient seen and evaluated on hemodialysis, tolerating treatment, see HD flowsheet for vitals and assessments.    No Hypotension, chest pain, shortness of breath, cramping, nausea or vomiting.      Labs have been reviewed and the dialysate bath has been adjusted.    Labs:      Recent Labs   Lab 10/12/23  0437 10/13/23  0326 10/13/23  1246 10/14/23  0400   * 127* 133* 133*   K 4.4 5.9* 4.1 4.7   CL 97 98 94* 93*   CO2 18* 16* 25 19*   BUN 27* 44* 27* 45*   CREATININE 2.8* 3.6* 2.3* 2.9*   CALCIUM 7.1* 7.3* 7.7* 7.2*   PHOS 4.4 7.3*  --  6.3*       Recent Labs   Lab 10/13/23  0326 10/14/23  0400 10/15/23  0451   WBC 9.99 7.77 10.42   HGB 8.9* 9.0* 8.5*   HCT 27.6* 26.9* 25.7*   PLT 97* 84* 78*          Assessment/Plan:  - Seen on dialysis this morning, tolerating session with current UFR, no complications.    - Ultrafiltration goal: 2L  - Will continue dialysis treatments while in-patient  - Continue to monitor intake and output   - Renally dose medications  - Pre/Post dialysis treatment weights  - Continue MARCIA with dialysis treatments   - Will review chronic care plan from outpatient dialysis center for MARCIA dosing   - Renal diet  - Will continue to follow closely.      Luz Varela MD  Nephrology attending  [facility]

## 2023-10-15 NOTE — SUBJECTIVE & OBJECTIVE
Interval History/Significant Events: Patient continues to be hungry but states breathing is starting to become more difficult     Review of Systems   Unable to perform ROS: Acuity of condition     Objective:     Vital Signs (Most Recent):  Temp: 97.8 °F (36.6 °C) (10/15/23 1100)  Pulse: 83 (10/15/23 1400)  Resp: (!) 24 (10/15/23 1400)  BP: (!) 162/86 (10/15/23 1400)  SpO2: 97 % (10/15/23 1400) Vital Signs (24h Range):  Temp:  [97.5 °F (36.4 °C)-97.9 °F (36.6 °C)] 97.8 °F (36.6 °C)  Pulse:  [75-93] 83  Resp:  [14-27] 24  SpO2:  [79 %-100 %] 97 %  BP: (140-187)/() 162/86   Weight: 70.2 kg (154 lb 12.2 oz)  Body mass index is 22.85 kg/m².      Intake/Output Summary (Last 24 hours) at 10/15/2023 1453  Last data filed at 10/15/2023 1400  Gross per 24 hour   Intake 959.65 ml   Output 2500 ml   Net -1540.35 ml          Physical Exam  Vitals reviewed.   Constitutional:       General: He is not in acute distress.     Appearance: He is ill-appearing.      Comments: Increased responsiveness and able to communicate/formulate coherent words   HENT:      Head: Normocephalic and atraumatic.      Right Ear: External ear normal.      Left Ear: External ear normal.      Nose: Nose normal.   Eyes:      General:         Right eye: No discharge.         Left eye: No discharge.      Pupils: Pupils are equal, round, and reactive to light.   Cardiovascular:      Rate and Rhythm: Normal rate and regular rhythm.   Pulmonary:      Effort: Respiratory distress present.      Breath sounds: Wheezing present.      Comments: Increased crackles auscultated compared to the day prior   Abdominal:      General: There is distension.      Tenderness: There is no abdominal tenderness.      Comments: Some degree of distension still present, however improved from previous days   Musculoskeletal:         General: No swelling or deformity.   Skin:     General: Skin is warm.      Coloration: Skin is not jaundiced.            Vents:  Vent Mode: Spont  (10/12/23 1503)  Set Rate: 20 BPM (10/12/23 1141)  Vt Set: 380 mL (10/12/23 1141)  Pressure Support: 5 cmH20 (10/12/23 1503)  PEEP/CPAP: 5 cmH20 (10/12/23 1503)  Oxygen Concentration (%): 80 (10/15/23 1400)  Peak Airway Pressure: 11 cmH20 (10/12/23 1503)  Plateau Pressure: 20 cmH20 (10/12/23 1503)  Total Ve: 12.8 L/m (10/12/23 1503)  Negative Inspiratory Force (cm H2O): 0 (10/12/23 1503)  F/VT Ratio<105 (RSBI): (!) 97.83 (10/12/23 1503)  Lines/Drains/Airways       Central Venous Catheter Line  Duration             Percutaneous Central Line Insertion/Assessment - Triple Lumen  10/11/23 1501 Internal Jugular Left 3 days              Peripheral Intravenous Line  Duration                  Hemodialysis AV Fistula Left upper arm -- days         Peripheral IV - Single Lumen 10/11/23 0239 20 G Anterior;Right Upper Arm 4 days                  Significant Labs:    CBC/Anemia Profile:  Recent Labs   Lab 10/14/23  0400 10/15/23  0451   WBC 7.77 10.42   HGB 9.0* 8.5*   HCT 26.9* 25.7*   PLT 84* 78*   MCV 90 89   RDW 16.1* 16.2*        Chemistries:  Recent Labs   Lab 10/14/23  0400   *   K 4.7   CL 93*   CO2 19*   BUN 45*   CREATININE 2.9*   CALCIUM 7.2*   ALBUMIN 2.4*   PROT 5.2*   BILITOT 2.3*   ALKPHOS 135   ALT 44   AST 67*   MG 2.2   PHOS 6.3*       All pertinent labs within the past 24 hours have been reviewed.    Significant Imaging:  I have reviewed all pertinent imaging results/findings within the past 24 hours.

## 2023-10-15 NOTE — ASSESSMENT & PLAN NOTE
Recent Labs   Lab 10/15/23  0451   WBC 10.42   RBC 2.89*   HGB 8.5*   HCT 25.7*   PLT 78*   MCV 89   MCH 29.4   MCHC 33.1     -- CBC daily and trend   -- Transfuse for hgb < 7   -- Receives EPO with iHD

## 2023-10-15 NOTE — PROGRESS NOTES
[facility] - Cardiac Medical ICU  Critical Care Medicine  Progress Note    Patient Name: Cosme Lewis  MRN: [id number]  Admission Date: 10/11/2023  Hospital Length of Stay: 4 days  Code Status: Full Code  Attending Provider: Victor M Mendez MD  Primary Care Provider: Eliecer Ohara III, MD   Principal Problem: Chronic hypoxemic respiratory failure    Subjective:     HPI:  Mr. Lewis is a 58 year old male with PMH notable for COPD, HFrEF (45% and DD), ESRD on dialysis, HTN, DM presents via EMS from nursing home for altered mental status and hypoxia.  Per EMS, patient was found to have glucose of 38, he received D50 with improvement in his glucose.  He also had SpO2 58% which improved with 15 L nasal cannula.     ED work up revealed VBG 7.27/61 and he was subsequently placed on bipap for work of breathing. He was persistently hypoglycemic with BG 59 --> 34 despite IV dextrose, therefore he was started on a continuous dextrose infusion. Other lab work up revealed WBC 3, stable, H//H 9.5/29, Na 143, CO2 20, Cr/BUN 3.8/38, elevated AST/ALT/alk phos, and troponin 0.174 with no ischemic changes on EKG. He was given IV rocephin and doxycyline. Chest XR with bilateral opacifications R > L.    Critical care consulted for acute hypoxemic respiratory failure and NIPPV      Hospital/ICU Course:  In the MICU, the patient arrived on bipap but was found to still be hypoxic which initially improved after adjusting his bipap settings. He continued to be altered and tried occasionally became combative requiring precedex which was affective when maxed out at 1.4 mcg/kg/hour. During this time he was also found to have worsening hypoglycemia which required continuous D10 administration at a max rate of 200 cc/hour. Eventually his oxygen status started declining and the decision was made to intubate the patient. It is believed that worsening of his oxygen status was in part due the large volume of D10 he received to maintain his blood sugars. For this  reason, it was decided that D20 at a slower rate would be more appropriate, and for this a central line was placed. Additionally, he received a round of hemodialysis. He is receiving remdesivir for COVID and vacomycin/zosyn/doxycycline for community acquired pneumonia coverage/concerning chest x-ray. The patient's sugars started rising on the D20 and eventually he started becoming hyperglycemic  to the 220s which led to the D20 being stopped for the time being and since that time the sugars have been relatively normoglycemic. His abdomen was found to be slightly more distended but KUB was negative for bowel obstruction, likely suggesting ascites as the source.    On 10/12 after passing SAT and SBT, the decision was made to extubate the patient which was done successfully. All sedatives were turned off at this time as well. He was placed on high flow at the time and has been sating well. He continued to receive antibiotics as well as antivirals for CAP coverage/COVID respectively which was able to help improve his likely pneumonia vs ARDS.     On 10/14, the patient had been weaned down to high flow but was found to be hypoxic and was placed back on Bipap due to increasing oxygen requirements.        Interval History/Significant Events: Patient continues to be hungry but states breathing is starting to become more difficult     Review of Systems   Unable to perform ROS: Acuity of condition     Objective:     Vital Signs (Most Recent):  Temp: 97.8 °F (36.6 °C) (10/15/23 1100)  Pulse: 83 (10/15/23 1400)  Resp: (!) 24 (10/15/23 1400)  BP: (!) 162/86 (10/15/23 1400)  SpO2: 97 % (10/15/23 1400) Vital Signs (24h Range):  Temp:  [97.5 °F (36.4 °C)-97.9 °F (36.6 °C)] 97.8 °F (36.6 °C)  Pulse:  [75-93] 83  Resp:  [14-27] 24  SpO2:  [79 %-100 %] 97 %  BP: (140-187)/() 162/86   Weight: 70.2 kg (154 lb 12.2 oz)  Body mass index is 22.85 kg/m².      Intake/Output Summary (Last 24 hours) at 10/15/2023 5475  Last data filed at  10/15/2023 1400  Gross per 24 hour   Intake 959.65 ml   Output 2500 ml   Net -1540.35 ml          Physical Exam  Vitals reviewed.   Constitutional:       General: He is not in acute distress.     Appearance: He is ill-appearing.      Comments: Increased responsiveness and able to communicate/formulate coherent words   HENT:      Head: Normocephalic and atraumatic.      Right Ear: External ear normal.      Left Ear: External ear normal.      Nose: Nose normal.   Eyes:      General:         Right eye: No discharge.         Left eye: No discharge.      Pupils: Pupils are equal, round, and reactive to light.   Cardiovascular:      Rate and Rhythm: Normal rate and regular rhythm.   Pulmonary:      Effort: Respiratory distress present.      Breath sounds: Wheezing present.      Comments: Increased crackles auscultated compared to the day prior   Abdominal:      General: There is distension.      Tenderness: There is no abdominal tenderness.      Comments: Some degree of distension still present, however improved from previous days   Musculoskeletal:         General: No swelling or deformity.   Skin:     General: Skin is warm.      Coloration: Skin is not jaundiced.            Vents:  Vent Mode: Spont (10/12/23 1503)  Set Rate: 20 BPM (10/12/23 1141)  Vt Set: 380 mL (10/12/23 1141)  Pressure Support: 5 cmH20 (10/12/23 1503)  PEEP/CPAP: 5 cmH20 (10/12/23 1503)  Oxygen Concentration (%): 80 (10/15/23 1400)  Peak Airway Pressure: 11 cmH20 (10/12/23 1503)  Plateau Pressure: 20 cmH20 (10/12/23 1503)  Total Ve: 12.8 L/m (10/12/23 1503)  Negative Inspiratory Force (cm H2O): 0 (10/12/23 1503)  F/VT Ratio<105 (RSBI): (!) 97.83 (10/12/23 1503)  Lines/Drains/Airways       Central Venous Catheter Line  Duration             Percutaneous Central Line Insertion/Assessment - Triple Lumen  10/11/23 1501 Internal Jugular Left 3 days              Peripheral Intravenous Line  Duration                  Hemodialysis AV Fistula Left upper arm --  days         Peripheral IV - Single Lumen 10/11/23 0239 20 G Anterior;Right Upper Arm 4 days                  Significant Labs:    CBC/Anemia Profile:  Recent Labs   Lab 10/14/23  0400 10/15/23  0451   WBC 7.77 10.42   HGB 9.0* 8.5*   HCT 26.9* 25.7*   PLT 84* 78*   MCV 90 89   RDW 16.1* 16.2*        Chemistries:  Recent Labs   Lab 10/14/23  0400   *   K 4.7   CL 93*   CO2 19*   BUN 45*   CREATININE 2.9*   CALCIUM 7.2*   ALBUMIN 2.4*   PROT 5.2*   BILITOT 2.3*   ALKPHOS 135   ALT 44   AST 67*   MG 2.2   PHOS 6.3*       All pertinent labs within the past 24 hours have been reviewed.    Significant Imaging:  I have reviewed all pertinent imaging results/findings within the past 24 hours.      ABG  Recent Labs   Lab 10/15/23  1310   PH 7.352   PO2 40   PCO2 46.3*   HCO3 25.7   BE 0     Assessment/Plan:     Pulmonary  * Chronic hypoxemic respiratory failure  Patient with Hypercapnic and Hypoxic Respiratory failure which is Chronic.  he is not on home oxygen. Supplemental oxygen was provided and noted-  Signs/symptoms of respiratory failure include- tachypnea, increased work of breathing and use of accessory muscles. Contributing diagnoses includes - CHF, COPD and fluid volume excess Labs and images were reviewed. Patient Has recent ABG, which has been reviewed. Will treat underlying causes and adjust management of respiratory failure as follows-     58 year old male with multiple medical problems who presented to [facility] ED via EMS for AMS, hypoglycemia, and hypoxia placed on NIPPV. Per chart review patient with room air saturation in 50s which improved with HFNC, however, he remained tachypnic with accessory muscle use, therefore he was placed on NIPPV. Unclear if patient requires oxygen at baseline. Previous hospitalizations with oxygen use. CXR with bilateral opacifications R > L and BNP elevated > 4900.     Intubated and mechanically ventilated, extubated to NC 10/13, sating well  Chest x-ray concerning for  pneumonia vs ARDS    - Monitoring ABGs PRN  - Remdesivir and for dexamethasone 6 mg daily for COVID.  - CAP coverage with Vanc/Zosyn.   - Received HD 10/11, 10/12, 10/13 and today. Will continue to receive while here in the hospital    Acute respiratory failure with hypoxia and hypercapnia  Patient with Hypoxic Respiratory failure which is Acute.  he is not on home oxygen. Supplemental oxygen was provided and noted.  Signs/symptoms of respiratory failure include- respiratory distress. Contributing diagnoses includes - ARDS Labs and images were reviewed. Patient Has recent ABG, which has been reviewed. Will treat underlying causes and adjust management of respiratory failure as follows- continuing antibiotics and antivirals, adjustments of mechanical ventilation, HD and fluid regulation.     Extubated to 5L NC morning of 10/13. In no acute distress  VBG 7.2552  However mental status improving    -continue to monitor on NC  -ABG as warranted  -Currently on bipap    COPD (chronic obstructive pulmonary disease)  --Duo nebs Q4wake  --Currently on Bipap    Cardiac/Vascular  HLD (hyperlipidemia)  -- Hold statin in the setting of elevated LFTs   -- CMP daily. Will resume Atorvastatin when LFTs improve    Chronic diastolic heart failure  ECHO 09/23:       Left Ventricle: The left ventricle is normal in size. Increased wall thickness. There is concentric hypertrophy. Mild global hypokinesis present. There is mildly reduced systolic function with a visually estimated ejection fraction of 40 - 45%. There is indeterminate diastolic function.    Right Ventricle: Normal right ventricular cavity size. Right ventricle wall motion  is normal. Systolic function is borderline low.    Left Atrium: Left atrium is severely dilated.    Tricuspid Valve: There is mild regurgitation.    Pericardium: There is a trivial effusion. Echodensity seen adjacent to the visceral pericardium of uncertain significance. This may be pericardial fat  although other etiologies not excluded. Correlate clinically.    PASP is at least 46 mmHg.    BNP > 4900    --Volume removal per iHD. Appreciate nephrology assistance   -- Home BP meds discontinued as pressures have been within normal limits. Hydralazine push should pressures rise     Renal/  Hyponatremia  Acute on chronic, contributions include kidney failure and previous oral rehydration with D10/D20 for glucose management. Resolved.    ESRD (end stage renal disease)  Outpatient HD [facility]: [facility]   HD tx days: MWF   HD tx time: 4hrs   HD access: LUE AVF   HD modality: iHD   Residual urine: Anuric   Estimated Creatinine Clearance: 33.8 mL/min (A) (based on SCr of 2.3 mg/dL (H)).    Toelrating iHD well    -- Nephrology consulted for iHD management. Appreciate their assistance   -- CMP daily and trend   -- Avoid nephrotoxins   -- Renally dose all medications to appropriate GFR / CrCl   -- Hgb > 7 and MAP > 65 goals     Hematology  History of pulmonary embolism  Diagnosed 10/2022  Repeat CT 3/2023 with resolution    -hold home eliquis for mental status change  -VTE ppx with heparin    Oncology  Anemia in ESRD (end-stage renal disease)  Recent Labs   Lab 10/15/23  0451   WBC 10.42   RBC 2.89*   HGB 8.5*   HCT 25.7*   PLT 78*   MCV 89   MCH 29.4   MCHC 33.1     -- CBC daily and trend   -- Transfuse for hgb < 7   -- Receives EPO with iHD    Endocrine  Hyperglycemia due to type 2 diabetes mellitus  See hypoglycemia       Critical Care Daily Checklist:    A: Awake: RASS Goal/Actual Goal: RASS Goal: 0-->alert and calm  Actual:     B: Spontaneous Breathing Trial Performed? Spon. Breathing Trial Initiated?: Not initiated (10/12/23 0742)   C: SAT & SBT Coordinated?  Patient extubated                      D: Delirium: CAM-ICU Overall CAM-ICU: Negative   E: Early Mobility Performed? No   F: Feeding Goal: Goals: Meet % EEN, EPN by RD f/u date  Status: Nutrition Goal Status: new   Current Diet Order   Procedures     Diet diabetic Minced & Moist (IDDSI Level 5); 2000 Calorie     Order Specific Question:   Additional Diet Options:     Answer:   Minced & Moist (IDDSI Level 5)     Order Specific Question:   Total calories:     Answer:   2000 Calorie      AS: Analgesia/Sedation None   T: Thromboembolic Prophylaxis Hepain   H: HOB > 300 Yes   U: Stress Ulcer Prophylaxis (if needed) N/a   G: Glucose Control Sliding scale insulin, insulin aspart and detemir   B: Bowel Function Stool Occurrence: 1   I: Indwelling Catheter (Lines & Dutta) Necessity L-IJ, PIV   D: De-escalation of Antimicrobials/Pharmacotherapies Continuing     Plan for the day/ETD Respiratory management     Code Status:  Family/Goals of Care: Full Code         Critical secondary to Patient has a condition that poses threat to life and bodily function: Severe Respiratory Distress      Critical care was time spent personally by me on the following activities: development of treatment plan with patient or surrogate and bedside caregivers, discussions with consultants, evaluation of patient's response to treatment, examination of patient, ordering and performing treatments and interventions, ordering and review of laboratory studies, ordering and review of radiographic studies, pulse oximetry, re-evaluation of patient's condition. This critical care time did not overlap with that of any other provider or involve time for any procedures.     Robbie Fields MD  Critical Care Medicine  [facility] - Cardiac Medical ICU

## 2023-10-15 NOTE — PROGRESS NOTES
Pharmacokinetic Assessment Follow Up: IV Vancomycin    Vancomycin serum concentration assessment/plan:  Random level resulted as 15.7 mcg/mL; Goal 15-20 mcg/mL, Pneumonia  ESRD on HD, not receiving HD today per Dr. Varela from nephrology   Hold Vancomycin today; re-dose when random level is less than 20 mcg/mL  Next level to be drawn on 10/16/23 with AM labs     Drug levels (last 3 results):  Recent Labs   Lab Result Units 10/13/23  0326 10/14/23  0400 10/15/23  0451   Vancomycin, Random ug/mL 27.6 16.7 15.7       Pharmacy will continue to follow and monitor vancomycin.    Please contact pharmacy at extension [phone] for questions regarding this assessment.    Thank you for the consult,   Remedios Chavez       Patient brief summary:  Cosme Lewis is a 58 y.o. male initiated on antimicrobial therapy with IV Vancomycin for treatment of lower respiratory infection    The patient's current regimen is dosing based on levels     Drug Allergies:   Review of patient's allergies indicates:  No Known Allergies    Actual Body Weight:   70.2 kg     Renal Function:   Estimated Creatinine Clearance: 26.9 mL/min (A) (based on SCr of 2.9 mg/dL (H)).,     Dialysis Method (if applicable):  intermittent HD    CBC (last 72 hours):  Recent Labs   Lab Result Units 10/13/23  0326 10/14/23  0400 10/15/23  0451   WBC K/uL 9.99 7.77 10.42   Hemoglobin g/dL 8.9* 9.0* 8.5*   Hematocrit % 27.6* 26.9* 25.7*   Platelets K/uL 97* 84* 78*   Gran % % 83.0* 94.3* 93.2*   Lymph % % 1.0* 2.3* 2.7*   Mono % % 0.0* 2.8* 3.7*   Eosinophil % % 0.0 0.0 0.0   Basophil % % 0.0 0.1 0.1   Differential Method  Manual Automated Automated       Metabolic Panel (last 72 hours):  Recent Labs   Lab Result Units 10/13/23  0326 10/13/23  1246 10/14/23  0400   Sodium mmol/L 127* 133* 133*   Potassium mmol/L 5.9* 4.1 4.7   Chloride mmol/L 98 94* 93*   CO2 mmol/L 16* 25 19*   Glucose mg/dL 165* 116* 197*   BUN mg/dL 44* 27* 45*   Creatinine mg/dL 3.6* 2.3* 2.9*   Albumin  g/dL 2.1*  --  2.4*   Total Bilirubin mg/dL 1.5*  --  2.3*   Alkaline Phosphatase U/L 130  --  135   AST U/L 64*  --  67*   ALT U/L 37  --  44   Magnesium mg/dL 2.4  --  2.2   Phosphorus mg/dL 7.3*  --  6.3*       Vancomycin Administrations:  vancomycin given in the last 96 hours                     vancomycin (VANCOCIN) 500 mg in dextrose 5 % in water (D5W) 100 mL IVPB (MB+) (mg) 500 mg New Bag 10/14/23 2151    vancomycin (VANCOCIN) 500 mg in dextrose 5 % in water (D5W) 100 mL IVPB (MB+) (mg) 500 mg New Bag 10/12/23 2215    vancomycin 1,250 mg in dextrose 5 % (D5W) 250 mL IVPB (Vial-Mate) (mg) 1,250 mg New Bag 10/11/23 1614                    Microbiologic Results:  Microbiology Results (last 7 days)       Procedure Component Value Units Date/Time    Blood culture [[id number]] Collected: 10/11/23 0942    Order Status: Completed Specimen: Blood from Peripheral, Upper Arm, Left Updated: 10/15/23 1212     Blood Culture, Routine No Growth to date      No Growth to date      No Growth to date      No Growth to date      No Growth to date    Blood culture [[id number]] Collected: 10/11/23 1513    Order Status: Completed Specimen: Blood from Line, Jugular, Internal Left Updated: 10/14/23 1622     Blood Culture, Routine No Growth to date      No Growth to date      No Growth to date      No Growth to date    MRSA Screen by PCR [[id number]]     Order Status: No result Specimen: Nasopharyngeal Swab from Nasal

## 2023-10-15 NOTE — ASSESSMENT & PLAN NOTE
Patient with Hypercapnic and Hypoxic Respiratory failure which is Chronic.  he is not on home oxygen. Supplemental oxygen was provided and noted-  Signs/symptoms of respiratory failure include- tachypnea, increased work of breathing and use of accessory muscles. Contributing diagnoses includes - CHF, COPD and fluid volume excess Labs and images were reviewed. Patient Has recent ABG, which has been reviewed. Will treat underlying causes and adjust management of respiratory failure as follows-     58 year old male with multiple medical problems who presented to [facility] ED via EMS for AMS, hypoglycemia, and hypoxia placed on NIPPV. Per chart review patient with room air saturation in 50s which improved with HFNC, however, he remained tachypnic with accessory muscle use, therefore he was placed on NIPPV. Unclear if patient requires oxygen at baseline. Previous hospitalizations with oxygen use. CXR with bilateral opacifications R > L and BNP elevated > 4900.     Intubated and mechanically ventilated, extubated to NC 10/13, sating well  Chest x-ray concerning for pneumonia vs ARDS    - Monitoring ABGs PRN  - Remdesivir and for dexamethasone 6 mg daily for COVID.  - CAP coverage with Vanc/Zosyn.   - Received HD 10/11, 10/12, 10/13 and today. Will continue to receive while here in the hospital

## 2023-10-16 PROBLEM — E87.1 HYPONATREMIA: Status: RESOLVED | Noted: 2023-01-01 | Resolved: 2023-01-01

## 2023-10-16 NOTE — PROGRESS NOTES
[facility] - Cardiac Medical ICU  Critical Care Medicine  Progress Note    Patient Name: Cosme Lewis  MRN: [id number]  Admission Date: 10/11/2023  Hospital Length of Stay: 5 days  Code Status: Full Code  Attending Provider: Ricky Vasquez MD  Primary Care Provider: Eliecer Ohara III, MD   Principal Problem: Chronic hypoxemic respiratory failure    Subjective:     HPI:  Mr. Lewis is a 58 year old male with PMH notable for COPD, HFrEF (45% and DD), ESRD on dialysis, HTN, DM presents via EMS from nursing home for altered mental status and hypoxia.  Per EMS, patient was found to have glucose of 38, he received D50 with improvement in his glucose.  He also had SpO2 58% which improved with 15 L nasal cannula.     ED work up revealed VBG 7.27/61 and he was subsequently placed on bipap for work of breathing. He was persistently hypoglycemic with BG 59 --> 34 despite IV dextrose, therefore he was started on a continuous dextrose infusion. Other lab work up revealed WBC 3, stable, H//H 9.5/29, Na 143, CO2 20, Cr/BUN 3.8/38, elevated AST/ALT/alk phos, and troponin 0.174 with no ischemic changes on EKG. He was given IV rocephin and doxycyline. Chest XR with bilateral opacifications R > L.    Critical care consulted for acute hypoxemic respiratory failure and NIPPV      Hospital/ICU Course:  In the MICU, the patient arrived on bipap but was found to still be hypoxic which initially improved after adjusting his bipap settings. He continued to be altered and tried occasionally became combative requiring precedex which was affective when maxed out at 1.4 mcg/kg/hour. During this time he was also found to have worsening hypoglycemia which required continuous D10 administration at a max rate of 200 cc/hour. Eventually his oxygen status started declining and the decision was made to intubate the patient. It is believed that worsening of his oxygen status was in part due the large volume of D10 he received to maintain his blood sugars. For  this reason, it was decided that D20 at a slower rate would be more appropriate, and for this a central line was placed. Additionally, he received a round of hemodialysis. He is receiving remdesivir for COVID and vacomycin/zosyn/doxycycline for community acquired pneumonia coverage/concerning chest x-ray. The patient's sugars started rising on the D20 and eventually he started becoming hyperglycemic  to the 220s which led to the D20 being stopped for the time being and since that time the sugars have been relatively normoglycemic. His abdomen was found to be slightly more distended but KUB was negative for bowel obstruction, likely suggesting ascites as the source.    On 10/12 after passing SAT and SBT, the decision was made to extubate the patient which was done successfully. All sedatives were turned off at this time as well. He was placed on high flow at the time and has been sating well. He continued to receive antibiotics as well as antivirals for CAP coverage/COVID respectively which was able to help improve his likely pneumonia vs ARDS.     On 10/14, the patient had been weaned down to high flow but was found to be hypoxic and was placed back on Bipap due to increasing oxygen requirements.        Interval History/Significant Events: No significant changes, he states he     Review of Systems   Unable to perform ROS: Acuity of condition     Objective:     Vital Signs (Most Recent):  Temp: 97.1 °F (36.2 °C) (10/16/23 0701)  Pulse: 82 (10/16/23 1227)  Resp: 18 (10/16/23 1227)  BP: (!) 173/91 (10/16/23 1100)  SpO2: (!) 90 % (10/16/23 1227) Vital Signs (24h Range):  Temp:  [97.1 °F (36.2 °C)-99.2 °F (37.3 °C)] 97.1 °F (36.2 °C)  Pulse:  [74-86] 82  Resp:  [16-31] 18  SpO2:  [86 %-98 %] 90 %  BP: (142-184)/() 173/91   Weight: 70.2 kg (154 lb 12.2 oz)  Body mass index is 22.85 kg/m².      Intake/Output Summary (Last 24 hours) at 10/16/2023 1309  Last data filed at 10/16/2023 0400  Gross per 24 hour   Intake  291.43 ml   Output 1 ml   Net 290.43 ml          Physical Exam  Vitals reviewed.   Constitutional:       General: He is not in acute distress.     Appearance: He is ill-appearing.      Comments: Increased responsiveness and able to communicate/formulate coherent words   HENT:      Head: Normocephalic and atraumatic.      Right Ear: External ear normal.      Left Ear: External ear normal.      Nose: Nose normal.   Eyes:      General:         Right eye: No discharge.         Left eye: No discharge.      Pupils: Pupils are equal, round, and reactive to light.   Cardiovascular:      Rate and Rhythm: Normal rate and regular rhythm.   Pulmonary:      Effort: Respiratory distress present.      Breath sounds: Wheezing present.      Comments: Increased crackles auscultated compared to the day prior   Abdominal:      General: There is distension.      Tenderness: There is no abdominal tenderness.      Comments: Some degree of distension still present, however improved from previous days   Musculoskeletal:         General: No swelling or deformity.   Skin:     General: Skin is warm.      Coloration: Skin is not jaundiced.   Psychiatric:         Mood and Affect: Mood normal.            Vents:  Vent Mode: Spont (10/12/23 1503)  Set Rate: 20 BPM (10/12/23 1141)  Vt Set: 380 mL (10/12/23 1141)  Pressure Support: 5 cmH20 (10/12/23 1503)  PEEP/CPAP: 5 cmH20 (10/12/23 1503)  Oxygen Concentration (%): 50 (10/16/23 1100)  Peak Airway Pressure: 11 cmH20 (10/12/23 1503)  Plateau Pressure: 20 cmH20 (10/12/23 1503)  Total Ve: 12.8 L/m (10/12/23 1503)  Negative Inspiratory Force (cm H2O): 0 (10/12/23 1503)  F/VT Ratio<105 (RSBI): (!) 97.83 (10/12/23 1503)  Lines/Drains/Airways       Central Venous Catheter Line  Duration             Percutaneous Central Line Insertion/Assessment - Triple Lumen  10/11/23 1501 Internal Jugular Left 4 days              Peripheral Intravenous Line  Duration                  Hemodialysis AV Fistula Left upper  arm -- days                  Significant Labs:    CBC/Anemia Profile:  Recent Labs   Lab 10/15/23  0451 10/16/23  0435   WBC 10.42 5.66   HGB 8.5* 8.0*   HCT 25.7* 23.6*   PLT 78* 62*   MCV 89 87   RDW 16.2* 16.4*        Chemistries:  Recent Labs   Lab 10/16/23  0435      K 4.5      CO2 21*   BUN 46*   CREATININE 2.9*   CALCIUM 7.2*   ALBUMIN 2.3*   PROT 5.2*   BILITOT 1.8*   ALKPHOS 127   ALT 34   AST 43*   MG 2.2   PHOS 4.3       All pertinent labs within the past 24 hours have been reviewed.    Significant Imaging:  I have reviewed all pertinent imaging results/findings within the past 24 hours.      ABG  Recent Labs   Lab 10/15/23  1310   PH 7.352   PO2 40   PCO2 46.3*   HCO3 25.7   BE 0     Assessment/Plan:     Pulmonary  * Chronic hypoxemic respiratory failure  Patient with Hypercapnic and Hypoxic Respiratory failure which is Chronic.  he is not on home oxygen. Supplemental oxygen was provided and noted-  Signs/symptoms of respiratory failure include- tachypnea, increased work of breathing and use of accessory muscles. Contributing diagnoses includes - CHF, COPD and fluid volume excess Labs and images were reviewed. Patient Has recent ABG, which has been reviewed. Will treat underlying causes and adjust management of respiratory failure as follows-     58 year old male with multiple medical problems who presented to [facility] ED via EMS for AMS, hypoglycemia, and hypoxia placed on NIPPV. Per chart review patient with room air saturation in 50s which improved with HFNC, however, he remained tachypnic with accessory muscle use, therefore he was placed on NIPPV. Unclear if patient requires oxygen at baseline. Previous hospitalizations with oxygen use. CXR with bilateral opacifications R > L and BNP elevated > 4900.     Intubated and mechanically ventilated, extubated to NC 10/13, sating well  Chest x-ray concerning for pneumonia vs ARDS    - Monitoring ABGs PRN  - Remdesivir and for dexamethasone 6 mg daily  for COVID.  - CAP coverage with Vanc/Zosyn, day #6   - Received HD 10/11, 10/12, 10/13 and 10/15. Plan for hemodialysis again today  - Duoneb nebulizer, tiotropium and Fluticasone furoate / Vilanterol     Acute respiratory failure with hypoxia and hypercapnia  Patient with Hypoxic Respiratory failure which is Acute.  he is not on home oxygen. Supplemental oxygen was provided and noted.  Signs/symptoms of respiratory failure include- respiratory distress. Contributing diagnoses includes - ARDS Labs and images were reviewed. Patient Has recent ABG, which has been reviewed. Will treat underlying causes and adjust management of respiratory failure as follows- continuing antibiotics and antivirals, adjustments of mechanical ventilation, HD and fluid regulation.     Extubated to 5L NC morning of 10/13. In no acute distress  VBG 7.2552  However mental status improving    -continue to monitor on NC  -ABG as warranted  -Currently on high flow    COPD (chronic obstructive pulmonary disease)  --Duo nebs Q4wake  --Currently on Bipap  --Fluticasone furoate / Vilanterol and Salmeterol     Cardiac/Vascular  HLD (hyperlipidemia)  -- Hold statin in the setting of elevated LFTs   -- CMP daily. Will resume Atorvastatin when LFTs improve    Chronic diastolic heart failure  ECHO 09/23:       Left Ventricle: The left ventricle is normal in size. Increased wall thickness. There is concentric hypertrophy. Mild global hypokinesis present. There is mildly reduced systolic function with a visually estimated ejection fraction of 40 - 45%. There is indeterminate diastolic function.    Right Ventricle: Normal right ventricular cavity size. Right ventricle wall motion  is normal. Systolic function is borderline low.    Left Atrium: Left atrium is severely dilated.    Tricuspid Valve: There is mild regurgitation.    Pericardium: There is a trivial effusion. Echodensity seen adjacent to the visceral pericardium of uncertain significance. This  may be pericardial fat although other etiologies not excluded. Correlate clinically.    PASP is at least 46 mmHg.    BNP > 4900    --Volume removal per iHD. Appreciate nephrology assistance   -- Home BP meds discontinued as pressures have been within normal limits. Hydralazine push should pressures rise     Renal/  ESRD (end stage renal disease)  Outpatient HD [facility]: [facility]   HD tx days: MWF   HD tx time: 4hrs   HD access: LUE AVF   HD modality: iHD   Residual urine: Anuric   Estimated Creatinine Clearance: 33.8 mL/min (A) (based on SCr of 2.3 mg/dL (H)).    Toelrating iHD well    -- Nephrology consulted for iHD management. Appreciate their assistance   -- CMP daily and trend   -- Avoid nephrotoxins   -- Renally dose all medications to appropriate GFR / CrCl   -- Hgb > 7 and MAP > 65 goals     Hematology  History of pulmonary embolism  Diagnosed 10/2022  Repeat CT 3/2023 with resolution    -hold home eliquis for mental status change  -VTE ppx with heparin    Oncology  Anemia in ESRD (end-stage renal disease)  Recent Labs   Lab 10/16/23  0435   WBC 5.66   RBC 2.70*   HGB 8.0*   HCT 23.6*   PLT 62*   MCV 87   MCH 29.6   MCHC 33.9     -- CBC daily and trend   -- Transfuse for hgb < 7   -- Receives EPO with iHD    Endocrine  Hyperglycemia due to type 2 diabetes mellitus  -Currently on sliding scale insulin and insulin aspart with meals        Critical Care Daily Checklist:    A: Awake: RASS Goal/Actual Goal: RASS Goal: 0-->alert and calm  Actual:     B: Spontaneous Breathing Trial Performed? Spon. Breathing Trial Initiated?: Not initiated (10/12/23 0742)   C: SAT & SBT Coordinated?  Passed                      D: Delirium: CAM-ICU Overall CAM-ICU: Negative   E: Early Mobility Performed? No   F: Feeding Goal: Goals: Meet % EEN, EPN by RD f/u date  Status: Nutrition Goal Status: new   Current Diet Order   Procedures    Diet diabetic Minced & Moist (IDDSI Level 5); 2000 Calorie     Order Specific  Question:   Additional Diet Options:     Answer:   Minced & Moist (IDDSI Level 5)     Order Specific Question:   Total calories:     Answer:   2000 Calorie      AS: Analgesia/Sedation None   T: Thromboembolic Prophylaxis Heparin   H: HOB > 300 Yes   U: Stress Ulcer Prophylaxis (if needed) None   G: Glucose Control Sliding scale insulin and insulin aspart with meals   B: Bowel Function Stool Occurrence: 2   I: Indwelling Catheter (Lines & Dutta) Necessity PIV   D: De-escalation of Antimicrobials/Pharmacotherapies Continuing    Plan for the day/ETD Respiratory management    Code Status:  Family/Goals of Care: Full Code         Critical secondary to Patient has a condition that poses threat to life and bodily function: Severe Respiratory Distress      Critical care was time spent personally by me on the following activities: development of treatment plan with patient or surrogate and bedside caregivers, discussions with consultants, evaluation of patient's response to treatment, examination of patient, ordering and performing treatments and interventions, ordering and review of laboratory studies, ordering and review of radiographic studies, pulse oximetry, re-evaluation of patient's condition. This critical care time did not overlap with that of any other provider or involve time for any procedures.     Robbie Fields MD  Critical Care Medicine  [facility] - Cardiac Medical ICU

## 2023-10-16 NOTE — ASSESSMENT & PLAN NOTE
Patient with Hypercapnic and Hypoxic Respiratory failure which is Chronic.  he is not on home oxygen. Supplemental oxygen was provided and noted-  Signs/symptoms of respiratory failure include- tachypnea, increased work of breathing and use of accessory muscles. Contributing diagnoses includes - CHF, COPD and fluid volume excess Labs and images were reviewed. Patient Has recent ABG, which has been reviewed. Will treat underlying causes and adjust management of respiratory failure as follows-     58 year old male with multiple medical problems who presented to [facility] ED via EMS for AMS, hypoglycemia, and hypoxia placed on NIPPV. Per chart review patient with room air saturation in 50s which improved with HFNC, however, he remained tachypnic with accessory muscle use, therefore he was placed on NIPPV. Unclear if patient requires oxygen at baseline. Previous hospitalizations with oxygen use. CXR with bilateral opacifications R > L and BNP elevated > 4900.     Intubated and mechanically ventilated, extubated to NC 10/13, sating well  Chest x-ray concerning for pneumonia vs ARDS    - Monitoring ABGs PRN  - Remdesivir and for dexamethasone 6 mg daily for COVID.  - CAP coverage with Vanc/Zosyn, day #6   - Received HD 10/11, 10/12, 10/13 and 10/15. Plan for hemodialysis again today  - Duoneb nebulizer, tiotropium and Fluticasone furoate / Vilanterol

## 2023-10-16 NOTE — CONSULTS
[facility] - Cardiac Medical ICU  Wound Care    Patient Name:  Cosme Lewis   MRN:  [id number]  Date: 10/16/2023  Diagnosis: Chronic hypoxemic respiratory failure    History:     Past Medical History:   Diagnosis Date    CHF (congestive heart failure)     Chronic kidney disease-mineral and bone disorder 10/12/2022    Chronic respiratory failure     COPD (chronic obstructive pulmonary disease)     Diabetes mellitus type 1     ESRD on dialysis     Hypertension     Hypervolemia 02/22/2023    Hypoglycemia 7/18/2023    Hyponatremia 03/04/2023    Hyponatremia 3/4/2023    Other insomnia     Recurrent major depression     SOB (shortness of breath) 10/12/2022    Patient with history COPD treated with Ellipta the albuterol, fluticasone-salmeterol tachypneic in the ED saturating 94% and 6 L on nasal cannula, patient does not know how many  he uses it is in nursing home.    -duo nebs Q 4  Given that patient is a poor historian, and in the setting of left lower extremity edema and history of coagulopathy PE cannot be ruled out.  Will workup for possible infec    Unspecified lack of coordination        Social History     Socioeconomic History    Marital status:    Tobacco Use    Smoking status: Never    Smokeless tobacco: Never   Substance and Sexual Activity    Alcohol use: Not Currently    Drug use: Not Currently    Sexual activity: Not Currently     Social Determinants of Health     Financial Resource Strain: Low Risk  (10/13/2023)    Overall Financial Resource Strain (CARDIA)     Difficulty of Paying Living Expenses: Not hard at all   Food Insecurity: No Food Insecurity (10/13/2023)    Hunger Vital Sign     Worried About Running Out of Food in the Last Year: Never true     Ran Out of Food in the Last Year: Never true   Transportation Needs: No Transportation Needs (10/13/2023)    PRAPARE - Transportation     Lack of Transportation (Medical): No     Lack of Transportation (Non-Medical): No   Physical Activity: Inactive  (10/13/2023)    Exercise Vital Sign     Days of Exercise per Week: 0 days     Minutes of Exercise per Session: 0 min   Stress: No Stress Concern Present (10/13/2023)    [employer] [place] [employer] - Occupational Stress Questionnaire     Feeling of Stress : Only a little   Recent Concern: Stress - Stress Concern Present (8/22/2023)    [employer] [place] [employer] - Occupational Stress Questionnaire     Feeling of Stress : To some extent   Social Connections: Socially Isolated (10/13/2023)    Social Connection and Isolation Panel [NHANES]     Frequency of Communication with Friends and Family: More than three times a week     Frequency of Social Gatherings with Friends and Family: Never     Attends Rastafarian Services: Never     Active Member of Clubs or Organizations: No     Attends Club or Organization Meetings: Never     Marital Status: Never    Housing Stability: Low Risk  (10/13/2023)    Housing Stability Vital Sign     Unable to Pay for Housing in the Last Year: No     Number of Places Lived in the Last Year: 1     Unstable Housing in the Last Year: No       Precautions:     Allergies as of 10/11/2023    (No Known Allergies)       WOC Assessment Details/Treatment     Wound consult received on patient's perineum/scrotum. Incontinent associated dermatitis noted. On low air loss surface. Triad barrier cream in use. Recommend continuing triad barrier cream to assist with moisture management. Nursing to continue care.     10/16/23 1449   Skin   Specialty Bed/Overlay Alternating pressure;Low air loss        Altered Skin Integrity 10/16/23 0300 Perineum Incontinence associated dermatitis   Date First Assessed/Time First Assessed: 10/16/23 0300   Location: Perineum  Primary Wound Type: (c) Incontinence associated dermatitis   Drainage Amount None   Drainage Characteristics/Odor No odor   Appearance Moist;Red   Tissue loss description Partial thickness   Periwound Area Excoriated;Redness    Wound Edges Undefined   Wound Length (cm)   (unclear margins)   Wound Width (cm)   (unclear margins)        Altered Skin Integrity 10/16/23 0300 Scrotum Incontinence associated dermatitis   Date First Assessed/Time First Assessed: 10/16/23 0300   Location: Scrotum  Is this injury device related?: No  Primary Wound Type: (c) Incontinence associated dermatitis   Drainage Amount None   Drainage Characteristics/Odor No odor   Appearance Moist;Red   Tissue loss description Partial thickness   Periwound Area Excoriated;Redness   Wound Edges Undefined   Wound Length (cm)   (unclear margins)   Wound Width (cm)   (unclear margins)

## 2023-10-16 NOTE — SUBJECTIVE & OBJECTIVE
Interval History/Significant Events: No significant changes, he states he     Review of Systems   Unable to perform ROS: Acuity of condition     Objective:     Vital Signs (Most Recent):  Temp: 97.1 °F (36.2 °C) (10/16/23 0701)  Pulse: 82 (10/16/23 1227)  Resp: 18 (10/16/23 1227)  BP: (!) 173/91 (10/16/23 1100)  SpO2: (!) 90 % (10/16/23 1227) Vital Signs (24h Range):  Temp:  [97.1 °F (36.2 °C)-99.2 °F (37.3 °C)] 97.1 °F (36.2 °C)  Pulse:  [74-86] 82  Resp:  [16-31] 18  SpO2:  [86 %-98 %] 90 %  BP: (142-184)/() 173/91   Weight: 70.2 kg (154 lb 12.2 oz)  Body mass index is 22.85 kg/m².      Intake/Output Summary (Last 24 hours) at 10/16/2023 1309  Last data filed at 10/16/2023 0400  Gross per 24 hour   Intake 291.43 ml   Output 1 ml   Net 290.43 ml          Physical Exam  Vitals reviewed.   Constitutional:       General: He is not in acute distress.     Appearance: He is ill-appearing.      Comments: Increased responsiveness and able to communicate/formulate coherent words   HENT:      Head: Normocephalic and atraumatic.      Right Ear: External ear normal.      Left Ear: External ear normal.      Nose: Nose normal.   Eyes:      General:         Right eye: No discharge.         Left eye: No discharge.      Pupils: Pupils are equal, round, and reactive to light.   Cardiovascular:      Rate and Rhythm: Normal rate and regular rhythm.   Pulmonary:      Effort: Respiratory distress present.      Breath sounds: Wheezing present.      Comments: Increased crackles auscultated compared to the day prior   Abdominal:      General: There is distension.      Tenderness: There is no abdominal tenderness.      Comments: Some degree of distension still present, however improved from previous days   Musculoskeletal:         General: No swelling or deformity.   Skin:     General: Skin is warm.      Coloration: Skin is not jaundiced.   Psychiatric:         Mood and Affect: Mood normal.            Vents:  Vent Mode: Spont  (10/12/23 1503)  Set Rate: 20 BPM (10/12/23 1141)  Vt Set: 380 mL (10/12/23 1141)  Pressure Support: 5 cmH20 (10/12/23 1503)  PEEP/CPAP: 5 cmH20 (10/12/23 1503)  Oxygen Concentration (%): 50 (10/16/23 1100)  Peak Airway Pressure: 11 cmH20 (10/12/23 1503)  Plateau Pressure: 20 cmH20 (10/12/23 1503)  Total Ve: 12.8 L/m (10/12/23 1503)  Negative Inspiratory Force (cm H2O): 0 (10/12/23 1503)  F/VT Ratio<105 (RSBI): (!) 97.83 (10/12/23 1503)  Lines/Drains/Airways       Central Venous Catheter Line  Duration             Percutaneous Central Line Insertion/Assessment - Triple Lumen  10/11/23 1501 Internal Jugular Left 4 days              Peripheral Intravenous Line  Duration                  Hemodialysis AV Fistula Left upper arm -- days                  Significant Labs:    CBC/Anemia Profile:  Recent Labs   Lab 10/15/23  0451 10/16/23  0435   WBC 10.42 5.66   HGB 8.5* 8.0*   HCT 25.7* 23.6*   PLT 78* 62*   MCV 89 87   RDW 16.2* 16.4*        Chemistries:  Recent Labs   Lab 10/16/23  0435      K 4.5      CO2 21*   BUN 46*   CREATININE 2.9*   CALCIUM 7.2*   ALBUMIN 2.3*   PROT 5.2*   BILITOT 1.8*   ALKPHOS 127   ALT 34   AST 43*   MG 2.2   PHOS 4.3       All pertinent labs within the past 24 hours have been reviewed.    Significant Imaging:  I have reviewed all pertinent imaging results/findings within the past 24 hours.

## 2023-10-16 NOTE — ASSESSMENT & PLAN NOTE
Patient with Hypoxic Respiratory failure which is Acute.  he is not on home oxygen. Supplemental oxygen was provided and noted.  Signs/symptoms of respiratory failure include- respiratory distress. Contributing diagnoses includes - ARDS Labs and images were reviewed. Patient Has recent ABG, which has been reviewed. Will treat underlying causes and adjust management of respiratory failure as follows- continuing antibiotics and antivirals, adjustments of mechanical ventilation, HD and fluid regulation.     Extubated to 5L NC morning of 10/13. In no acute distress  VBG 7.2552  However mental status improving    -continue to monitor on NC  -ABG as warranted  -Currently on high flow

## 2023-10-16 NOTE — SUBJECTIVE & OBJECTIVE
Interval History:   HD completed yesterday with 2L net fluid removal, tolerated well.  Hypervolemic on exam on HFNC in NAD.  HDS off pressor support.    Review of patient's allergies indicates:  No Known Allergies  Current Facility-Administered Medications   Medication Frequency    0.9%  NaCl infusion Once    acetaminophen tablet 650 mg Q6H PRN    albuterol-ipratropium 2.5 mg-0.5 mg/3 mL nebulizer solution 3 mL Q6H PRN    albuterol-ipratropium 2.5 mg-0.5 mg/3 mL nebulizer solution 3 mL Q4H    ascorbic acid (vitamin C) tablet 500 mg BID    dexAMETHasone injection 6 mg Daily    dextrose 10% bolus 125 mL 125 mL PRN    dextrose 10% bolus 250 mL 250 mL PRN    dextrose 10% bolus 250 mL 250 mL PRN    glucagon (human recombinant) injection 1 mg PRN    glucose chewable tablet 16 g PRN    glucose chewable tablet 24 g PRN    heparin (porcine) injection 5,000 Units Q12H    insulin aspart U-100 pen 0-10 Units Q6H PRN    insulin aspart U-100 pen 2 Units TIDWM    multivitamin tablet Daily    mupirocin 2 % ointment BID    piperacillin-tazobactam (ZOSYN) 4.5 g in dextrose 5 % in water (D5W) 100 mL IVPB (MB+) Q12H    sodium chloride 0.9% bolus 250 mL 250 mL PRN    sodium chloride 0.9% flush 10 mL PRN    vancomycin - pharmacy to dose pharmacy to manage frequency       Objective:     Vital Signs (Most Recent):  Temp: 97.1 °F (36.2 °C) (10/16/23 0701)  Pulse: 80 (10/16/23 1000)  Resp: (!) 22 (10/16/23 1000)  BP: (!) 159/88 (10/16/23 1000)  SpO2: 98 % (10/16/23 1000) Vital Signs (24h Range):  Temp:  [97.1 °F (36.2 °C)-99.2 °F (37.3 °C)] 97.1 °F (36.2 °C)  Pulse:  [74-86] 80  Resp:  [16-31] 22  SpO2:  [86 %-100 %] 98 %  BP: (142-184)/() 159/88     Weight: 70.2 kg (154 lb 12.2 oz) (10/14/23 0400)  Body mass index is 22.85 kg/m².  Body surface area is 1.85 meters squared.    I/O last 3 completed shifts:  In: 570.7 [IV Piggyback:570.7]  Out: 2501 [Other:2500; Stool:1]     Physical Exam  Vitals and nursing note reviewed.    Constitutional:       General: He is not in acute distress.     Appearance: He is well-developed. He is ill-appearing.      Interventions: Nasal cannula in place.   HENT:      Head: Normocephalic and atraumatic.      Nose: No congestion or rhinorrhea.   Eyes:      General: No scleral icterus.        Right eye: No discharge.         Left eye: No discharge.      Extraocular Movements: Extraocular movements intact.      Conjunctiva/sclera: Conjunctivae normal.   Neck:      Vascular: JVD present.   Cardiovascular:      Rate and Rhythm: Normal rate and regular rhythm.      Heart sounds: No murmur heard.     No friction rub. No gallop.      Comments: UMA GERARDO  Pulmonary:      Effort: No respiratory distress.      Breath sounds: Rhonchi and rales present. No wheezing.   Abdominal:      General: There is no distension.      Palpations: Abdomen is soft.      Tenderness: There is no abdominal tenderness.   Musculoskeletal:         General: No swelling.      Cervical back: Normal range of motion.      Right lower leg: Edema present.      Left lower leg: Edema present.   Skin:     General: Skin is warm and dry.   Neurological:      Mental Status: He is alert. Mental status is at baseline.          Significant Labs:  CBC:   Recent Labs   Lab 10/16/23  0435   WBC 5.66   RBC 2.70*   HGB 8.0*   HCT 23.6*   PLT 62*   MCV 87   MCH 29.6   MCHC 33.9     CMP:   Recent Labs   Lab 10/16/23  0435   GLU 53*   CALCIUM 7.2*   ALBUMIN 2.3*   PROT 5.2*      K 4.5   CO2 21*      BUN 46*   CREATININE 2.9*   ALKPHOS 127   ALT 34   AST 43*   BILITOT 1.8*

## 2023-10-16 NOTE — ASSESSMENT & PLAN NOTE
Recent Labs   Lab 10/16/23  0435   WBC 5.66   RBC 2.70*   HGB 8.0*   HCT 23.6*   PLT 62*   MCV 87   MCH 29.6   MCHC 33.9     -- CBC daily and trend   -- Transfuse for hgb < 7   -- Receives EPO with iHD

## 2023-10-16 NOTE — PROGRESS NOTES
[facility]  Nephrology  Progress Note    Patient Name: Cosme Lewis  MRN: [id number]  Admission Date: 10/11/2023  Hospital Length of Stay: 5 days  Attending Provider: Ricky Vasquez MD   Primary Care Physician: Eliecer Ohara III, MD  Principal Problem:Chronic hypoxemic respiratory failure    Subjective:     HPI: Cosme Lewis is a 59 yo male with PMHx of ESRD on iHD MWF who presents from his NH with AMS, hypoxia, and hypoglycemia.  He was placed on BiPAP and transferred to ICU for higher level of care.  CXR with evidence of pulmonary edema, BNP elevated > 3800.  Labs on chemistry without emergent electrolyte abnormalities consistent with ESRD status.  CBC without leukocytosis.  He was found to be COVID + and started on Broad spectrum Abx and steroids. He required D5W gtt for persistent hypoglycemia.  Nephrology has been consulted for ESRD management while IP.      Interval History:   HD completed yesterday with 2L net fluid removal, tolerated well.  Hypervolemic on exam on HFNC in NAD.  HDS off pressor support.    Review of patient's allergies indicates:  No Known Allergies  Current Facility-Administered Medications   Medication Frequency    0.9%  NaCl infusion Once    acetaminophen tablet 650 mg Q6H PRN    albuterol-ipratropium 2.5 mg-0.5 mg/3 mL nebulizer solution 3 mL Q6H PRN    albuterol-ipratropium 2.5 mg-0.5 mg/3 mL nebulizer solution 3 mL Q4H    ascorbic acid (vitamin C) tablet 500 mg BID    dexAMETHasone injection 6 mg Daily    dextrose 10% bolus 125 mL 125 mL PRN    dextrose 10% bolus 250 mL 250 mL PRN    dextrose 10% bolus 250 mL 250 mL PRN    glucagon (human recombinant) injection 1 mg PRN    glucose chewable tablet 16 g PRN    glucose chewable tablet 24 g PRN    heparin (porcine) injection 5,000 Units Q12H    insulin aspart U-100 pen 0-10 Units Q6H PRN    insulin aspart U-100 pen 2 Units TIDWM    multivitamin tablet Daily    mupirocin 2 % ointment BID     piperacillin-tazobactam (ZOSYN) 4.5 g in dextrose 5 % in water (D5W) 100 mL IVPB (MB+) Q12H    sodium chloride 0.9% bolus 250 mL 250 mL PRN    sodium chloride 0.9% flush 10 mL PRN    vancomycin - pharmacy to dose pharmacy to manage frequency       Objective:     Vital Signs (Most Recent):  Temp: 97.1 °F (36.2 °C) (10/16/23 0701)  Pulse: 80 (10/16/23 1000)  Resp: (!) 22 (10/16/23 1000)  BP: (!) 159/88 (10/16/23 1000)  SpO2: 98 % (10/16/23 1000) Vital Signs (24h Range):  Temp:  [97.1 °F (36.2 °C)-99.2 °F (37.3 °C)] 97.1 °F (36.2 °C)  Pulse:  [74-86] 80  Resp:  [16-31] 22  SpO2:  [86 %-100 %] 98 %  BP: (142-184)/() 159/88     Weight: 70.2 kg (154 lb 12.2 oz) (10/14/23 0400)  Body mass index is 22.85 kg/m².  Body surface area is 1.85 meters squared.    I/O last 3 completed shifts:  In: 570.7 [IV Piggyback:570.7]  Out: 2501 [Other:2500; Stool:1]     Physical Exam  Vitals and nursing note reviewed.   Constitutional:       General: He is not in acute distress.     Appearance: He is well-developed. He is ill-appearing.      Interventions: Nasal cannula in place.   HENT:      Head: Normocephalic and atraumatic.      Nose: No congestion or rhinorrhea.   Eyes:      General: No scleral icterus.        Right eye: No discharge.         Left eye: No discharge.      Extraocular Movements: Extraocular movements intact.      Conjunctiva/sclera: Conjunctivae normal.   Neck:      Vascular: JVD present.   Cardiovascular:      Rate and Rhythm: Normal rate and regular rhythm.      Heart sounds: No murmur heard.     No friction rub. No gallop.      Comments: UMA AVF  Pulmonary:      Effort: No respiratory distress.      Breath sounds: Rhonchi and rales present. No wheezing.   Abdominal:      General: There is no distension.      Palpations: Abdomen is soft.      Tenderness: There is no abdominal tenderness.   Musculoskeletal:         General: No swelling.      Cervical back: Normal range of motion.      Right lower leg: Edema  present.      Left lower leg: Edema present.   Skin:     General: Skin is warm and dry.   Neurological:      Mental Status: He is alert. Mental status is at baseline.          Significant Labs:  CBC:   Recent Labs   Lab 10/16/23  0435   WBC 5.66   RBC 2.70*   HGB 8.0*   HCT 23.6*   PLT 62*   MCV 87   MCH 29.6   MCHC 33.9     CMP:   Recent Labs   Lab 10/16/23  0435   GLU 53*   CALCIUM 7.2*   ALBUMIN 2.3*   PROT 5.2*      K 4.5   CO2 21*      BUN 46*   CREATININE 2.9*   ALKPHOS 127   ALT 34   AST 43*   BILITOT 1.8*        Assessment/Plan:     Renal/  ESRD (end stage renal disease)  [place]HD MWF  [facility]  4 [facility]  EDW:  71 kg  UMA AVF    Plan/Recommendations:  -HD again today  -UFG 2-3L as tolerated  -dialysate bath adjusted to current electrolytes  -continue strict I/O's  -RFP daily      Oncology  Anemia in ESRD (end-stage renal disease)  Below goal for ESRD  -start on EPO with HD        Juventino Nguyen, NP  Nephrology  [facility] - [facility]

## 2023-10-16 NOTE — PLAN OF CARE
MICU DAILY GOALS     Family/Goals of care/Code Status   Code Status: Full Code    24H Vital Sign Range  Temp:  [97.7 °F (36.5 °C)-99.2 °F (37.3 °C)]   Pulse:  [75-86]   Resp:  [16-31]   BP: (142-180)/()   SpO2:  [79 %-100 %]      Shift Events  No acute events over night; tolerated BIPAP well overnight, maintained SpO2 goal.  Hypoglycemic episode x1 CBG 51>50, 125 cc D10 bolus given per orders, CBG re-check 94. Pt asymptomatic, VSS.    AWAKE RASS: Goal - RASS Goal: 0-->alert and calm  Actual - RASS (Osorio Agitation-Sedation Scale): alert and calm    Restraint necessity: Not necessary   BREATHE SBT: Not intubated    Coordinate A & B, analgesics/sedatives Pain: managed   SAT: Not intubated   Delirium CAM-ICU: Overall CAM-ICU: Negative   Early(intubated/ Progressive (non-intubated) Mobility MOVE Screen (INTUBATED ONLY): Not intubated    Activity: Activity Management: Rolling - L1, Arm raise - L1, Straight leg raise - L1   Feeding/Nutrition Diet order: Diet/Nutrition Received: consistent carb/diabetic diet, mechanical/dental soft,     Thrombus DVT prophylaxis: VTE Required Core Measure: Pharmacological prophylaxis initiated/maintained   HOB Elevation Head of Bed (HOB) Positioning: HOB at 30-45 degrees   Ulcer Prophylaxis GI: yes   Glucose control managed Glycemic Management: blood glucose monitored   Skin Skin assessed during: Daily Assessment    [] No Altered Skin Integrity Present    []Prevention Measures Documented      [x] Yes- Altered Skin Integrity Present or Discovered   [x] LDA Added if Not in Epic (Describe Wound)   [] New Altered Skin Integrity was Present on Admit and Documented in LDA   [] Wound Image Taken    Wound Care Consulted? Yes    Attending Nurse:  Christian Childs RN    Second RN/Staff Member:  Lev Kirby RN   Bowel Function no issues, incontinence   Indwelling Catheter Necessity      Percutaneous Central Line Insertion/Assessment - Triple Lumen  10/11/23 1501 Internal Jugular  Left-Line Necessity Review: Hemodynamic instability     De-escalation Antibiotics Yes       VS and assessment per flow sheet, patient progressing towards goals as tolerated, plan of care reviewed with [unfilled], all concerns addressed at this time.    Christian Childs RN

## 2023-10-16 NOTE — ASSESSMENT & PLAN NOTE
ESRD on iHD MWF  [facility]  4 hours  EDW:  71 kg  UMA AVF    Plan/Recommendations:  -HD again today  -UFG 2-3L as tolerated  -dialysate bath adjusted to current electrolytes  -continue strict I/O's  -RFP daily

## 2023-10-16 NOTE — PLAN OF CARE
[facility] - Card[facility] Medical [facility]  Discharge Reassessment    Primary Care Provider: Eliecer Ohara III, MD    Expected Discharge Date: 10/20/2023    Pt is not medically ready for dc at this time.    Pt will return to his NH, [facility], once medically cleared. Ref sent to [facility].    SW will continue to follow for dc needs.    Reassessment (most recent)       Discharge Reassessment - 10/16/23 1238          Discharge Reassessment    Assessment Type Discharge Planning Reassessment     Did the patient's condition or plan change since previous assessment? Yes     Discharge Plan A Return to nursing home     Discharge Plan B Return to Nursing Home     DME Needed Upon Discharge  none     Transition of Care Barriers None     Why the patient remains in the hospital Requires continued medical care        Post-Acute Status    Post-Acute Authorization Placement     Post-Acute Placement Status Pending medical clearance/testing     Discharge Delays None known at this time                   Discharge Plan A and Plan B have been determined by review of patient's clinical status, future medical and therapeutic needs, and coverage/benefits for post-acute care in coordination with multidisciplinary team members.     Noy Elizabeth, BRYON  PRN

## 2023-10-16 NOTE — PROGRESS NOTES
Pharmacokinetic Assessment Follow Up: IV Vancomycin    Vancomycin serum concentration assessment(s):    Vancomycin random level resulted at 15.7 mcg/mL. Goal level is 10 to 20 mcg/mL.     Nephrology is consulted for ESRD and plans for HD today.     Drug levels (last 3 results):  Recent Labs   Lab Result Units 10/14/23  0400 10/15/23  0451 10/16/23  0435   Vancomycin, Random ug/mL 16.7 15.7 16.9     Vancomycin Regimen Plan:    Hold vancomycin IV and redose with 500 mg on 10/17 after HD session. Next level to be drawn as needed for dialysis and duration of therapy.     Pharmacy will continue to follow and monitor vancomycin.    Please contact pharmacy at extension [phone] for questions regarding this assessment.    Thank you for the consult,   Lamar Almanzar, PharmD, BCCCP               Patient brief summary:  Cosme Lewis is a 58 y.o. male initiated on antimicrobial therapy with IV vancomycin for treatment of sepsis    Drug Allergies:   Review of patient's allergies indicates:  No Known Allergies    Actual Body Weight:   70.2 kg     Renal Function:   Estimated Creatinine Clearance: 26.9 mL/min (A) (based on SCr of 2.9 mg/dL (H)).    Dialysis Method (if applicable):  intermittent HD    CBC (last 72 hours):  Recent Labs   Lab Result Units 10/14/23  0400 10/15/23  0451 10/16/23  0435   WBC K/uL 7.77 10.42 5.66   Hemoglobin g/dL 9.0* 8.5* 8.0*   Hematocrit % 26.9* 25.7* 23.6*   Platelets K/uL 84* 78* 62*   Gran % % 94.3* 93.2* 84.5*   Lymph % % 2.3* 2.7* 7.8*   Mono % % 2.8* 3.7* 7.1   Eosinophil % % 0.0 0.0 0.0   Basophil % % 0.1 0.1 0.2   Differential Method  Automated Automated Automated       Metabolic Panel (last 72 hours):  Recent Labs   Lab Result Units 10/14/23  0400 10/16/23  0435   Sodium mmol/L 133* 139   Potassium mmol/L 4.7 4.5   Chloride mmol/L 93* 104   CO2 mmol/L 19* 21*   Glucose mg/dL 197* 53*   BUN mg/dL 45* 46*   Creatinine mg/dL 2.9* 2.9*   Albumin g/dL 2.4* 2.3*   Total Bilirubin mg/dL 2.3* 1.8*    Alkaline Phosphatase U/L 135 127   AST U/L 67* 43*   ALT U/L 44 34   Magnesium mg/dL 2.2 2.2   Phosphorus mg/dL 6.3* 4.3       Vancomycin Administrations:  vancomycin given in the last 96 hours                     vancomycin (VANCOCIN) 500 mg in dextrose 5 % in water (D5W) 100 mL IVPB (MB+) (mg) 500 mg New Bag 10/14/23 2151    vancomycin (VANCOCIN) 500 mg in dextrose 5 % in water (D5W) 100 mL IVPB (MB+) (mg) 500 mg New Bag 10/12/23 2215                    Microbiologic Results:  Microbiology Results (last 7 days)       Procedure Component Value Units Date/Time    Blood culture [[id number]] Collected: 10/11/23 1513    Order Status: Completed Specimen: Blood from Line, Jugular, Internal Left Updated: 10/16/23 1622     Blood Culture, Routine No growth after 5 days.    Blood culture [[id number]] Collected: 10/11/23 0942    Order Status: Completed Specimen: Blood from Peripheral, Upper Arm, Left Updated: 10/16/23 1212     Blood Culture, Routine No growth after 5 days.    MRSA Screen by PCR [[id number]]     Order Status: No result Specimen: Nasopharyngeal Swab from Nasal

## 2023-10-17 NOTE — PLAN OF CARE
MICU DAILY GOALS     Family/Goals of care/Code Status   Code Status: Full Code    24H Vital Sign Range  Temp:  [97.7 °F (36.5 °C)-99.4 °F (37.4 °C)]   Pulse:  [67-83]   Resp:  [16-40]   BP: (145-195)/()   SpO2:  [82 %-100 %]      Shift Events   No acute events throughout shift; Pt placed on 6-10 L NC this am; tolerated well; No gtts; VSS.    AWAKE RASS: Goal - RASS Goal: 0-->alert and calm  Actual - RASS (Osorio Agitation-Sedation Scale): alert and calm    Restraint necessity: Not necessary   BREATHE SBT: Not intubated    Coordinate A & B, analgesics/sedatives Pain: managed   SAT: Not intubated   Delirium CAM-ICU: Overall CAM-ICU: Negative   Early(intubated/ Progressive (non-intubated) Mobility MOVE Screen (INTUBATED ONLY): Not intubated    Activity: Activity Management: Arm raise - L1, Rolling - L1   Feeding/Nutrition Diet order: Diet/Nutrition Received: consistent carb/diabetic diet, Specialty Diet/Nutrition Received: renal diet   Thrombus DVT prophylaxis: VTE Required Core Measure: Pharmacological prophylaxis initiated/maintained   HOB Elevation Head of Bed (HOB) Positioning: HOB at 30-45 degrees   Ulcer Prophylaxis GI: yes   Glucose control managed Glycemic Management: blood glucose monitored   Skin Skin assessed during: Daily Assessment    [] No Altered Skin Integrity Present    []Prevention Measures Documented      [] Yes- Altered Skin Integrity Present or Discovered   [] LDA Added if Not in Epic (Describe Wound)   [] New Altered Skin Integrity was Present on Admit and Documented in LDA   [] Wound Image Taken    Wound Care Consulted? Yes    Attending Nurse:  Stefany Lagunas RN/Staff Member:  Lev Kirby RN   Bowel Function diarrhea    Indwelling Catheter Necessity      [REMOVED] Percutaneous Central Line Insertion/Assessment - Triple Lumen  10/11/23 1501 Internal Jugular Left-Line Necessity Review: Hemodynamic instability     De-escalation Antibiotics Yes       VS and assessment per flow  sheet, patient progressing towards goals as tolerated, plan of care reviewed with Cosme Lewis, all concerns addressed, will continue to monitor.

## 2023-10-17 NOTE — SUBJECTIVE & OBJECTIVE
Interval History/Significant Events: No significant changes, he states he feels his breathing is ok.    Review of Systems   Unable to perform ROS: Acuity of condition     Objective:     Vital Signs (Most Recent):  Temp: 98.1 °F (36.7 °C) (10/17/23 1100)  Pulse: 71 (10/17/23 1200)  Resp: (!) 25 (10/17/23 1200)  BP: (!) 156/72 (10/17/23 1200)  SpO2: (!) 91 % (10/17/23 1200) Vital Signs (24h Range):  Temp:  [98.1 °F (36.7 °C)-99.4 °F (37.4 °C)] 98.1 °F (36.7 °C)  Pulse:  [69-85] 71  Resp:  [16-28] 25  SpO2:  [82 %-100 %] 91 %  BP: (156-204)/() 156/72   Weight: 70.2 kg (154 lb 12.2 oz)  Body mass index is 22.85 kg/m².      Intake/Output Summary (Last 24 hours) at 10/17/2023 1358  Last data filed at 10/17/2023 0030  Gross per 24 hour   Intake 56.24 ml   Output 4500 ml   Net -4443.76 ml          Physical Exam  Vitals reviewed.   Constitutional:       General: He is not in acute distress.     Appearance: He is ill-appearing.      Comments: Increased responsiveness and able to communicate/formulate coherent words   HENT:      Head: Normocephalic and atraumatic.      Right Ear: External ear normal.      Left Ear: External ear normal.      Nose: Nose normal.   Eyes:      General:         Right eye: No discharge.         Left eye: No discharge.      Pupils: Pupils are equal, round, and reactive to light.   Cardiovascular:      Rate and Rhythm: Normal rate and regular rhythm.   Pulmonary:      Effort: Respiratory distress present.      Breath sounds: Wheezing present.      Comments: Increased crackles auscultated compared to the day prior   Abdominal:      General: There is distension.      Tenderness: There is no abdominal tenderness.      Comments: Some degree of distension still present, however improved from previous days   Musculoskeletal:         General: No swelling or deformity.   Skin:     General: Skin is warm.      Coloration: Skin is not jaundiced.   Psychiatric:         Mood and Affect: Mood normal.             Vents:  Vent Mode: Spont (10/12/23 1503)  Set Rate: 20 BPM (10/12/23 1141)  Vt Set: 380 mL (10/12/23 1141)  Pressure Support: 5 cmH20 (10/12/23 1503)  PEEP/CPAP: 5 cmH20 (10/12/23 1503)  Oxygen Concentration (%): 60 (10/17/23 0701)  Peak Airway Pressure: 11 cmH20 (10/12/23 1503)  Plateau Pressure: 20 cmH20 (10/12/23 1503)  Total Ve: 12.8 L/m (10/12/23 1503)  Negative Inspiratory Force (cm H2O): 0 (10/12/23 1503)  F/VT Ratio<105 (RSBI): (!) 97.83 (10/12/23 1503)  Lines/Drains/Airways       Peripheral Intravenous Line  Duration                  Hemodialysis AV Fistula Left upper arm -- days         Peripheral IV - Single Lumen 10/16/23 1709 20 G;1 3/4 in Anterior;Right Upper Arm <1 day         Peripheral IV - Single Lumen 10/16/23 1710 20 G;1 3/4 in Anterior;Right Upper Arm <1 day                  Significant Labs:    CBC/Anemia Profile:  Recent Labs   Lab 10/16/23  0435 10/17/23  0417   WBC 5.66 3.65*   HGB 8.0* 7.8*   HCT 23.6* 23.6*   PLT 62* 35*   MCV 87 87   RDW 16.4* 16.9*        Chemistries:  Recent Labs   Lab 10/16/23  0435 10/17/23  0417    141   K 4.5 3.8    101   CO2 21* 25   BUN 46* 36*   CREATININE 2.9* 2.2*   CALCIUM 7.2* 7.8*   ALBUMIN 2.3* 2.4*   PROT 5.2* 5.3*   BILITOT 1.8* 1.8*   ALKPHOS 127 130   ALT 34 31   AST 43* 35   MG 2.2 2.1   PHOS 4.3 3.4       All pertinent labs within the past 24 hours have been reviewed.    Significant Imaging:  I have reviewed all pertinent imaging results/findings within the past 24 hours.

## 2023-10-17 NOTE — SUBJECTIVE & OBJECTIVE
Interval History:   HD completed overnight with 4L net fluid removal, remains on HFNC this morning.  Electrolytes stable non-emergent.      Review of patient's allergies indicates:  No Known Allergies  Current Facility-Administered Medications   Medication Frequency    0.9%  NaCl infusion Once    acetaminophen tablet 650 mg Q6H PRN    albuterol-ipratropium 2.5 mg-0.5 mg/3 mL nebulizer solution 3 mL Q4H PRN    carvediloL tablet 12.5 mg BID WM    dexAMETHasone injection 6 mg Daily    dextrose 10% bolus 125 mL 125 mL PRN    dextrose 10% bolus 250 mL 250 mL PRN    FLUoxetine capsule 40 mg Daily    fluticasone furoate-vilanteroL 100-25 mcg/dose diskus inhaler 1 puff Daily    glucagon (human recombinant) injection 1 mg PRN    glucose chewable tablet 16 g PRN    glucose chewable tablet 24 g PRN    hydrALAZINE injection 10 mg Q6H PRN    insulin aspart U-100 pen 0-10 Units QID (AC + HS) PRN    multivitamin tablet Daily    NIFEdipine 24 hr tablet 60 mg BID    piperacillin-tazobactam (ZOSYN) 4.5 g in dextrose 5 % in water (D5W) 100 mL IVPB (MB+) Q12H    sodium chloride 0.9% bolus 250 mL 250 mL PRN    tiotropium bromide 2.5 mcg/actuation inhaler 2 puff Daily    vancomycin (VANCOCIN) 500 mg in dextrose 5 % in water (D5W) 100 mL IVPB (MB+) Once    vancomycin - pharmacy to dose pharmacy to manage frequency       Objective:     Vital Signs (Most Recent):  Temp: 98.4 °F (36.9 °C) (10/17/23 0330)  Pulse: 70 (10/17/23 1000)  Resp: (!) 21 (10/17/23 1000)  BP: (!) 167/73 (10/17/23 1000)  SpO2: (!) 93 % (10/17/23 1000) Vital Signs (24h Range):  Temp:  [97 °F (36.1 °C)-99.4 °F (37.4 °C)] 98.4 °F (36.9 °C)  Pulse:  [69-85] 70  Resp:  [16-28] 21  SpO2:  [82 %-100 %] 93 %  BP: (157-204)/() 167/73     Weight: 70.2 kg (154 lb 12.2 oz) (10/14/23 0400)  Body mass index is 22.85 kg/m².  Body surface area is 1.85 meters squared.    I/O last 3 completed shifts:  In: 313.2 [IV Piggyback:313.2]  Out: 4500 [Other:4500]     Physical Exam  Vitals  and nursing note reviewed.   Constitutional:       General: He is not in acute distress.     Appearance: He is well-developed. He is ill-appearing.      Interventions: Nasal cannula in place.   HENT:      Head: Normocephalic and atraumatic.      Nose: No congestion or rhinorrhea.   Eyes:      General: No scleral icterus.        Right eye: No discharge.         Left eye: No discharge.      Extraocular Movements: Extraocular movements intact.      Conjunctiva/sclera: Conjunctivae normal.   Neck:      Vascular: JVD present.   Cardiovascular:      Rate and Rhythm: Normal rate and regular rhythm.      Heart sounds: No murmur heard.     No friction rub. No gallop.      Comments: UMA AVLARRY  Pulmonary:      Effort: No respiratory distress.      Breath sounds: Rhonchi and rales present. No wheezing.   Abdominal:      General: There is no distension.      Palpations: Abdomen is soft.      Tenderness: There is no abdominal tenderness.   Musculoskeletal:         General: No swelling.      Cervical back: Normal range of motion.      Right lower leg: Edema present.      Left lower leg: Edema present.   Skin:     General: Skin is warm and dry.   Neurological:      Mental Status: He is alert. Mental status is at baseline.          Significant Labs:  CBC:   Recent Labs   Lab 10/17/23  0417   WBC 3.65*   RBC 2.70*   HGB 7.8*   HCT 23.6*   PLT 35*   MCV 87   MCH 28.9   MCHC 33.1     CMP:   Recent Labs   Lab 10/17/23  0417      CALCIUM 7.8*   ALBUMIN 2.4*   PROT 5.3*      K 3.8   CO2 25      BUN 36*   CREATININE 2.2*   ALKPHOS 130   ALT 31   AST 35   BILITOT 1.8*

## 2023-10-17 NOTE — PROGRESS NOTES
HD tx completed  Net UF: 4L per pt request  Manual pressure held for 10 minutes; dressing dry and intact; no oozing or  bleeding noted.

## 2023-10-17 NOTE — PROGRESS NOTES
[facility]  Critical Care Medicine  Progress Note    Patient Name: Cosme Lewis  MRN: [id number]  Admission Date: 10/11/2023  Hospital Length of Stay: 6 days  Code Status: Full Code  Attending Provider: Ricky Vasquez MD  Primary Care Provider: Eliecer Ohara III, MD   Principal Problem: Chronic hypoxemic respiratory failure    Subjective:     HPI:  Mr. Lewis is a 58 year old male with PMH notable for COPD, HFrEF (45% and DD), ESRD on dialysis, HTN, DM presents via EMS from nursing home for altered mental status and hypoxia.  Per EMS, patient was found to have glucose of 38, he received D50 with improvement in his glucose.  He also had SpO2 58% which improved with 15 L nasal cannula.     ED work up revealed VBG 7.27/61 and he was subsequently placed on bipap for work of breathing. He was persistently hypoglycemic with BG 59 --> 34 despite IV dextrose, therefore he was started on a continuous dextrose infusion. Other lab work up revealed WBC 3, stable, H//H 9.5/29, Na 143, CO2 20, Cr/BUN 3.8/38, elevated AST/ALT/alk phos, and troponin 0.174 with no ischemic changes on EKG. He was given IV rocephin and doxycyline. Chest XR with bilateral opacifications R > L.    Critical care consulted for acute hypoxemic respiratory failure and NIPPV      Hospital/ICU Course:  In the MICU, the patient arrived on bipap but was found to still be hypoxic which initially improved after adjusting his bipap settings. He continued to be altered and tried occasionally became combative requiring precedex which was affective when maxed out at 1.4 mcg/kg/hour. During this time he was also found to have worsening hypoglycemia which required continuous D10 administration at a max rate of 200 cc/hour. Eventually his oxygen status started declining and the decision was made to intubate the patient. It is believed that worsening of his oxygen status was in part due the large volume of D10 he received to maintain his blood sugars. For  this reason, it was decided that D20 at a slower rate would be more appropriate, and for this a central line was placed. Additionally, he received a round of hemodialysis. He is receiving remdesivir for COVID and vacomycin/zosyn/doxycycline for community acquired pneumonia coverage/concerning chest x-ray. The patient's sugars started rising on the D20 and eventually he started becoming hyperglycemic  to the 220s which led to the D20 being stopped for the time being and since that time the sugars have been relatively normoglycemic. His abdomen was found to be slightly more distended but KUB was negative for bowel obstruction, likely suggesting ascites as the source.    On 10/12 after passing SAT and SBT, the decision was made to extubate the patient which was done successfully. All sedatives were turned off at this time as well. He was placed on high flow at the time and has been sating well. He continued to receive antibiotics as well as antivirals for CAP coverage/COVID respectively which was able to help improve his likely pneumonia vs ARDS.     Between 10/14 - 10/17 he went back and forth between requiring high flow and bipap, however on 10/17 he was able to tolerate nasal canula.         Interval History/Significant Events: No significant changes, he states he feels his breathing is ok.    Review of Systems   Unable to perform ROS: Acuity of condition     Objective:     Vital Signs (Most Recent):  Temp: 98.1 °F (36.7 °C) (10/17/23 1100)  Pulse: 71 (10/17/23 1200)  Resp: (!) 25 (10/17/23 1200)  BP: (!) 156/72 (10/17/23 1200)  SpO2: (!) 91 % (10/17/23 1200) Vital Signs (24h Range):  Temp:  [98.1 °F (36.7 °C)-99.4 °F (37.4 °C)] 98.1 °F (36.7 °C)  Pulse:  [69-85] 71  Resp:  [16-28] 25  SpO2:  [82 %-100 %] 91 %  BP: (156-204)/() 156/72   Weight: 70.2 kg (154 lb 12.2 oz)  Body mass index is 22.85 kg/m².      Intake/Output Summary (Last 24 hours) at 10/17/2023 1358  Last data filed at 10/17/2023 0030  Gross per  24 hour   Intake 56.24 ml   Output 4500 ml   Net -4443.76 ml          Physical Exam  Vitals reviewed.   Constitutional:       General: He is not in acute distress.     Appearance: He is ill-appearing.      Comments: Increased responsiveness and able to communicate/formulate coherent words   HENT:      Head: Normocephalic and atraumatic.      Right Ear: External ear normal.      Left Ear: External ear normal.      Nose: Nose normal.   Eyes:      General:         Right eye: No discharge.         Left eye: No discharge.      Pupils: Pupils are equal, round, and reactive to light.   Cardiovascular:      Rate and Rhythm: Normal rate and regular rhythm.   Pulmonary:      Effort: Respiratory distress present.      Breath sounds: Wheezing present.      Comments: Increased crackles auscultated compared to the day prior   Abdominal:      General: There is distension.      Tenderness: There is no abdominal tenderness.      Comments: Some degree of distension still present, however improved from previous days   Musculoskeletal:         General: No swelling or deformity.   Skin:     General: Skin is warm.      Coloration: Skin is not jaundiced.   Psychiatric:         Mood and Affect: Mood normal.            Vents:  Vent Mode: Spont (10/12/23 1503)  Set Rate: 20 BPM (10/12/23 1141)  Vt Set: 380 mL (10/12/23 1141)  Pressure Support: 5 cmH20 (10/12/23 1503)  PEEP/CPAP: 5 cmH20 (10/12/23 1503)  Oxygen Concentration (%): 60 (10/17/23 0701)  Peak Airway Pressure: 11 cmH20 (10/12/23 1503)  Plateau Pressure: 20 cmH20 (10/12/23 1503)  Total Ve: 12.8 L/m (10/12/23 1503)  Negative Inspiratory Force (cm H2O): 0 (10/12/23 1503)  F/VT Ratio<105 (RSBI): (!) 97.83 (10/12/23 1503)  Lines/Drains/Airways       Peripheral Intravenous Line  Duration                  Hemodialysis AV Fistula Left upper arm -- days         Peripheral IV - Single Lumen 10/16/23 1709 20 G;1 3/4 in Anterior;Right Upper Arm <1 day         Peripheral IV - Single Lumen  10/16/23 1710 20 G;1 3/4 in Anterior;Right Upper Arm <1 day                  Significant Labs:    CBC/Anemia Profile:  Recent Labs   Lab 10/16/23  0435 10/17/23  0417   WBC 5.66 3.65*   HGB 8.0* 7.8*   HCT 23.6* 23.6*   PLT 62* 35*   MCV 87 87   RDW 16.4* 16.9*        Chemistries:  Recent Labs   Lab 10/16/23  0435 10/17/23  0417    141   K 4.5 3.8    101   CO2 21* 25   BUN 46* 36*   CREATININE 2.9* 2.2*   CALCIUM 7.2* 7.8*   ALBUMIN 2.3* 2.4*   PROT 5.2* 5.3*   BILITOT 1.8* 1.8*   ALKPHOS 127 130   ALT 34 31   AST 43* 35   MG 2.2 2.1   PHOS 4.3 3.4       All pertinent labs within the past 24 hours have been reviewed.    Significant Imaging:  I have reviewed all pertinent imaging results/findings within the past 24 hours.      ABG  Recent Labs   Lab 10/15/23  1310   PH 7.352   PO2 40   PCO2 46.3*   HCO3 25.7   BE 0     Assessment/Plan:     Pulmonary  * Chronic hypoxemic respiratory failure  Patient with Hypercapnic and Hypoxic Respiratory failure which is Chronic.  he is not on home oxygen. Supplemental oxygen was provided and noted-  Signs/symptoms of respiratory failure include- tachypnea, increased work of breathing and use of accessory muscles. Contributing diagnoses includes - CHF, COPD and fluid volume excess Labs and images were reviewed. Patient Has recent ABG, which has been reviewed. Will treat underlying causes and adjust management of respiratory failure as follows-     58 year old male with multiple medical problems who [place] [facility] [place] for AMS, hypoglycemia, and hypoxia placed on NIPPV. Per chart review patient with room air saturation in 50s which improved with HFNC, however, he remained tachypnic with accessory muscle use, therefore he was placed on NIPPV. Unclear if patient requires oxygen at baseline. Previous hospitalizations with oxygen use. CXR with bilateral opacifications R > L and BNP elevated > 4900.     Intubated and mechanically ventilated, extubated to NC 10/13,  sating well  Chest x-ray concerning for pneumonia vs ARDS    - Monitoring ABGs PRN  - Remdesivir and for dexamethasone 6 mg daily for COVID.  - CAP coverage with Vanc/Zosyn, day #7 (final day)   - Received HD 10/11, 10/12, 10/13, 10/15 and 10/16.   - Duoneb nebulizer, tiotropium and Fluticasone furoate / Vilanterol     Acute respiratory failure with hypoxia and hypercapnia  Patient with Hypoxic Respiratory failure which is Acute.  he is not on home oxygen. Supplemental oxygen was provided and noted.  Signs/symptoms of respiratory failure include- respiratory distress. Contributing diagnoses includes - ARDS Labs and images were reviewed. Patient Has recent ABG, which has been reviewed. Will treat underlying causes and adjust management of respiratory failure as follows- continuing antibiotics and antivirals, adjustments of mechanical ventilation, HD and fluid regulation.     Extubated to 5L NC morning of 10/13. In no acute distress  VBG 7.2552  However mental status improving    -continue to monitor on NC  -ABG as warranted  -Currently on nasal canula    COPD (chronic obstructive pulmonary disease)  --Duo nebs Q4wake  --Currently on Bipap  --Fluticasone furoate / Vilanterol and Salmeterol     Cardiac/Vascular  HLD (hyperlipidemia)  -- Hold statin in the setting of elevated LFTs   -- CMP daily. Will resume Atorvastatin when LFTs improve    Chronic diastolic heart failure  ECHO 09/23:       Left Ventricle: The left ventricle is normal in size. Increased wall thickness. There is concentric hypertrophy. Mild global hypokinesis present. There is mildly reduced systolic function with a visually estimated ejection fraction of 40 - 45%. There is indeterminate diastolic function.    Right Ventricle: Normal right ventricular cavity size. Right ventricle wall motion  is normal. Systolic function is borderline low.    Left Atrium: Left atrium is severely dilated.    Tricuspid Valve: There is mild regurgitation.     Pericardium: There is a trivial effusion. Echodensity seen adjacent to the visceral pericardium of uncertain significance. This may be pericardial fat although other etiologies not excluded. Correlate clinically.    PASP is at least 46 mmHg.    BNP > 4900    --Volume removal per iHD. Appreciate nephrology assistance   --Blood pressure started becoming elevated on 10/17. Home nifedipine and lisinopril restarted as pressures have been within normal limits. Hydralazine pushes prn should pressures rise.     Renal/  ESRD (end stage renal disease)  Outpatient HD [facility]: [facility]   HD tx days: MWF   HD tx time: 4hrs   HD access: LUE AVF   HD modality: iHD   Residual urine: Anuric   Estimated Creatinine Clearance: 33.8 mL/min (A) (based on SCr of 2.3 mg/dL (H)).    Toelrating iHD well    -- Nephrology consulted for iHD management. Appreciate their assistance   -- CMP daily and trend   -- Avoid nephrotoxins   -- Renally dose all medications to appropriate GFR / CrCl   -- Hgb > 7 and MAP > 65 goals     Hematology  History of pulmonary embolism  Diagnosed 10/2022  Repeat CT 3/2023 with resolution    -hold home eliquis for mental status change  -VTE ppx with heparin    Oncology  Anemia in ESRD (end-stage renal disease)  Recent Labs   Lab 10/17/23  0417   WBC 3.65*   RBC 2.70*   HGB 7.8*   HCT 23.6*   PLT 35*   MCV 87   MCH 28.9   MCHC 33.1     -- CBC daily and trend   -- Transfuse for hgb < 7   -- Receives EPO with iHD    Endocrine  Hyperglycemia due to type 2 diabetes mellitus  -Currently on sliding scale insulin and insulin aspart with meals        Critical Care Daily Checklist:    A: Awake: RASS Goal/Actual Goal: RASS Goal: 0-->alert and calm  Actual:     B: Spontaneous Breathing Trial Performed? Spon. Breathing Trial Initiated?: Not initiated (10/12/23 0742)   C: SAT & SBT Coordinated?  Passed                     D: Delirium: CAM-ICU Overall CAM-ICU: Negative   E: Early Mobility Performed? No   F: Feeding Goal:  Goals: Meet % EEN, EPN by RD f/u date  Status: Nutrition Goal Status: new   Current Diet Order   Procedures    Diet diabetic Minced & Moist (IDDSI Level 5); 2000 Calorie     Order Specific Question:   Additional Diet Options:     Answer:   Minced & Moist (IDDSI Level 5)     Order Specific Question:   Total calories:     Answer:   2000 Calorie      AS: Analgesia/Sedation None   T: Thromboembolic Prophylaxis None (heparin discontinued)   H: HOB > 300 Yes   U: Stress Ulcer Prophylaxis (if needed) None   G: Glucose Control Sliding scale insulin, insulin aspart with meals   B: Bowel Function Stool Occurrence: 1   I: Indwelling Catheter (Lines & Dutta) Necessity PIVs   D: De-escalation of Antimicrobials/Pharmacotherapies Continuing    Plan for the day/ETD Respiratory and blood pressure management    Code Status:  Family/Goals of Care: Full Code         Critical secondary to Patient has a condition that poses threat to life and bodily function: Severe Respiratory Distress      Critical care was time spent personally by me on the following activities: development of treatment plan with patient or surrogate and bedside caregivers, discussions with consultants, evaluation of patient's response to treatment, examination of patient, ordering and performing treatments and interventions, ordering and review of laboratory studies, ordering and review of radiographic studies, pulse oximetry, re-evaluation of patient's condition. This critical care time did not overlap with that of any other provider or involve time for any procedures.     Robbie Fields MD  Critical Care Medicine  [facility] - Cardiac Medical ICU

## 2023-10-17 NOTE — PROGRESS NOTES
[facility]  Nephrology  Progress Note    Patient Name: Cosme Lewis  MRN: [id number]  Admission Date: 10/11/2023  Hospital Length of Stay: 6 days  Attending Provider: Ricky Vasquez MD   Primary Care Physician: Eliecer Ohara III, MD  Principal Problem:Chronic hypoxemic respiratory failure    Subjective:     HPI: Cosme Lewis is a 59 yo male with PMHx of ESRD on iHD MWF who presents from his NH with AMS, hypoxia, and hypoglycemia.  He was placed on BiPAP and transferred to ICU for higher level of care.  CXR with evidence of pulmonary edema, BNP elevated > 3800.  Labs on chemistry without emergent electrolyte abnormalities consistent with ESRD status.  CBC without leukocytosis.  He was found to be COVID + and started on Broad spectrum Abx and steroids. He required D5W gtt for persistent hypoglycemia.  Nephrology has been consulted for ESRD management while IP.      Interval History:   HD completed overnight with 4L net fluid removal, remains on HFNC this morning.  Electrolytes stable non-emergent.      Review of patient's allergies indicates:  No Known Allergies  Current Facility-Administered Medications   Medication Frequency    0.9%  NaCl infusion Once    acetaminophen tablet 650 mg Q6H PRN    albuterol-ipratropium 2.5 mg-0.5 mg/3 mL nebulizer solution 3 mL Q4H PRN    carvediloL tablet 12.5 mg BID WM    dexAMETHasone injection 6 mg Daily    dextrose 10% bolus 125 mL 125 mL PRN    dextrose 10% bolus 250 mL 250 mL PRN    FLUoxetine capsule 40 mg Daily    fluticasone furoate-vilanteroL 100-25 mcg/dose diskus inhaler 1 puff Daily    glucagon (human recombinant) injection 1 mg PRN    glucose chewable tablet 16 g PRN    glucose chewable tablet 24 g PRN    hydrALAZINE injection 10 mg Q6H PRN    insulin aspart U-100 pen 0-10 Units QID (AC + HS) PRN    multivitamin tablet Daily    NIFEdipine 24 hr tablet 60 mg BID    piperacillin-tazobactam (ZOSYN) 4.5 g in dextrose 5 % in water (D5W) 100 mL  IVPB (MB+) Q12H    sodium chloride 0.9% bolus 250 mL 250 mL PRN    tiotropium bromide 2.5 mcg/actuation inhaler 2 puff Daily    vancomycin (VANCOCIN) 500 mg in dextrose 5 % in water (D5W) 100 mL IVPB (MB+) Once    vancomycin - pharmacy to dose pharmacy to manage frequency       Objective:     Vital Signs (Most Recent):  Temp: 98.4 °F (36.9 °C) (10/17/23 0330)  Pulse: 70 (10/17/23 1000)  Resp: (!) 21 (10/17/23 1000)  BP: (!) 167/73 (10/17/23 1000)  SpO2: (!) 93 % (10/17/23 1000) Vital Signs (24h Range):  Temp:  [97 °F (36.1 °C)-99.4 °F (37.4 °C)] 98.4 °F (36.9 °C)  Pulse:  [69-85] 70  Resp:  [16-28] 21  SpO2:  [82 %-100 %] 93 %  BP: (157-204)/() 167/73     Weight: 70.2 kg (154 lb 12.2 oz) (10/14/23 0400)  Body mass index is 22.85 kg/m².  Body surface area is 1.85 meters squared.    I/O last 3 completed shifts:  In: 313.2 [IV Piggyback:313.2]  Out: 4500 [Other:4500]     Physical Exam  Vitals and nursing note reviewed.   Constitutional:       General: He is not in acute distress.     Appearance: He is well-developed. He is ill-appearing.      Interventions: Nasal cannula in place.   HENT:      Head: Normocephalic and atraumatic.      Nose: No congestion or rhinorrhea.   Eyes:      General: No scleral icterus.        Right eye: No discharge.         Left eye: No discharge.      Extraocular Movements: Extraocular movements intact.      Conjunctiva/sclera: Conjunctivae normal.   Neck:      Vascular: JVD present.   Cardiovascular:      Rate and Rhythm: Normal rate and regular rhythm.      Heart sounds: No murmur heard.     No friction rub. No gallop.      Comments: UMA AVF  Pulmonary:      Effort: No respiratory distress.      Breath sounds: Rhonchi and rales present. No wheezing.   Abdominal:      General: There is no distension.      Palpations: Abdomen is soft.      Tenderness: There is no abdominal tenderness.   Musculoskeletal:         General: No swelling.      Cervical back: Normal range of motion.       Right lower leg: Edema present.      Left lower leg: Edema present.   Skin:     General: Skin is warm and dry.   Neurological:      Mental Status: He is alert. Mental status is at baseline.          Significant Labs:  CBC:   Recent Labs   Lab 10/17/23  0417   WBC 3.65*   RBC 2.70*   HGB 7.8*   HCT 23.6*   PLT 35*   MCV 87   MCH 28.9   MCHC 33.1     CMP:   Recent Labs   Lab 10/17/23  0417      CALCIUM 7.8*   ALBUMIN 2.4*   PROT 5.3*      K 3.8   CO2 25      BUN 36*   CREATININE 2.2*   ALKPHOS 130   ALT 31   AST 35   BILITOT 1.8*          Assessment/Plan:     Renal/  ESRD (end stage renal disease)  ESRD on iHD F  [facility]  4 hours  EDW:  71 kg  UMA AVF    Plan/Recommendations:  -no further plans for RRT today  -next HD session tomorrow  -continue strict I/O's  -RFP daily  -renal diet when advanced, 1L fluid restrictions  -Phos WNL, no need for binders at this time    Oncology  Anemia in ESRD (end-stage renal disease)  Below goal for ESRD  -EPO with HD        Juventino Nguyen, NP  Nephrology  [facility] - Card[facility] Medical [facility]

## 2023-10-17 NOTE — ASSESSMENT & PLAN NOTE
ECHO 09/23:       Left Ventricle: The left ventricle is normal in size. Increased wall thickness. There is concentric hypertrophy. Mild global hypokinesis present. There is mildly reduced systolic function with a visually estimated ejection fraction of 40 - 45%. There is indeterminate diastolic function.    Right Ventricle: Normal right ventricular cavity size. Right ventricle wall motion  is normal. Systolic function is borderline low.    Left Atrium: Left atrium is severely dilated.    Tricuspid Valve: There is mild regurgitation.    Pericardium: There is a trivial effusion. Echodensity seen adjacent to the visceral pericardium of uncertain significance. This may be pericardial fat although other etiologies not excluded. Correlate clinically.    PASP is at least 46 mmHg.    BNP > 4900    --Volume removal per iHD. Appreciate nephrology assistance   --Blood pressure started becoming elevated on 10/17. Home nifedipine and lisinopril restarted as pressures have been within normal limits. Hydralazine pushes prn should pressures rise.

## 2023-10-17 NOTE — PLAN OF CARE
MICU DAILY GOALS     Family/Goals of care/Code Status   Code Status: Full Code    24H Vital Sign Range  Temp:  [97 °F (36.1 °C)-99.4 °F (37.4 °C)]   Pulse:  [69-86]   Resp:  [16-28]   BP: (157-204)/()   SpO2:  [82 %-100 %]      Shift Events  - Hypertensive throughout shift; team made aware; PRN IV hydralazine administered x1, plans to restart home BP medications  - underwent 3 hr of HD, see dialysis RN note  - alternated between BIPAP and AIRVO this shift, tolerated both well, maintained SpO2 goals throughout shift    AWAKE RASS: Goal - RASS Goal: 0-->alert and calm  Actual - RASS (Osorio Agitation-Sedation Scale): drowsy    Restraint necessity: Not necessary   BREATHE SBT: Not intubated    Coordinate A & B, analgesics/sedatives Pain: managed   SAT: Not intubated   Delirium CAM-ICU: Overall CAM-ICU: Negative   Early(intubated/ Progressive (non-intubated) Mobility MOVE Screen (INTUBATED ONLY): Not intubated    Activity: Activity Management: Rolling - L1, Arm raise - L1, Straight leg raise - L1   Feeding/Nutrition Diet order: Diet/Nutrition Received: consistent carb/diabetic diet,     Thrombus DVT prophylaxis: VTE Required Core Measure: Pharmacological prophylaxis initiated/maintained   HOB Elevation Head of Bed (HOB) Positioning: HOB at 30-45 degrees   Ulcer Prophylaxis GI: yes   Glucose control managed Glycemic Management: blood glucose monitored   Skin Skin assessed during: Daily Assessment    [] No Altered Skin Integrity Present    []Prevention Measures Documented      [x] Yes- Altered Skin Integrity Present or Discovered   [] LDA Added if Not in Epic (Describe Wound)   [] New Altered Skin Integrity was Present on Admit and Documented in LDA   [] Wound Image Taken    Wound Care Consulted? Yes    Attending Nurse:  Christian Childs RN    Second RN/Staff Member:     Bowel Function no issues    Indwelling Catheter Necessity      Percutaneous Central Line Insertion/Assessment - Triple Lumen  10/11/23 1501 Internal  Jugular Left-Line Necessity Review: Hemodynamic instability     De-escalation Antibiotics Yes       VS and assessment per flow sheet, patient progressing towards goals as tolerated, plan of care reviewed with [unfilled], all concerns addressed at this time.    Christian Childs RN

## 2023-10-17 NOTE — ASSESSMENT & PLAN NOTE
Recent Labs   Lab 10/17/23  0417   WBC 3.65*   RBC 2.70*   HGB 7.8*   HCT 23.6*   PLT 35*   MCV 87   MCH 28.9   MCHC 33.1     -- CBC daily and trend   -- Transfuse for hgb < 7   -- Receives EPO with iHD

## 2023-10-17 NOTE — ASSESSMENT & PLAN NOTE
Patient with Hypoxic Respiratory failure which is Acute.  he is not on home oxygen. Supplemental oxygen was provided and noted.  Signs/symptoms of respiratory failure include- respiratory distress. Contributing diagnoses includes - ARDS Labs and images were reviewed. Patient Has recent ABG, which has been reviewed. Will treat underlying causes and adjust management of respiratory failure as follows- continuing antibiotics and antivirals, adjustments of mechanical ventilation, HD and fluid regulation.     Extubated to 5L NC morning of 10/13. In no acute distress  VBG 7.2552  However mental status improving    -continue to monitor on NC  -ABG as warranted  -Currently on nasal canula

## 2023-10-17 NOTE — PLAN OF CARE
MICU DAILY GOALS     Family/Goals of care/Code Status   Code Status: Full Code    24H Vital Sign Range  Temp:  [97 °F (36.1 °C)-99.2 °F (37.3 °C)]   Pulse:  [74-86]   Resp:  [16-31]   BP: (158-204)/()   SpO2:  [82 %-100 %]      Shift Events   Pt had 1 hypoglycemic episode this am; BG 54; glucose tabs given as ordered; repeat BG 94; pt eating without difficulty; On and off BIPAP/Airvo throughout day; remain on Airborne/droplet isolation; HD tonight.    AWAKE RASS: Goal - RASS Goal: 0-->alert and calm  Actual - RASS (Osorio Agitation-Sedation Scale): alert and calm    Restraint necessity: Not necessary   BREATHE SBT: Not intubated    Coordinate A & B, analgesics/sedatives Pain: managed   SAT: Not intubated   Delirium CAM-ICU: Overall CAM-ICU: Negative   Early(intubated/ Progressive (non-intubated) Mobility MOVE Screen (INTUBATED ONLY): Not intubated    Activity: Activity Management: Arm raise - L1, Rolling - L1   Feeding/Nutrition Diet order: Diet/Nutrition Received: consistent carb/diabetic diet,     Thrombus DVT prophylaxis: VTE Required Core Measure: Pharmacological prophylaxis initiated/maintained   HOB Elevation Head of Bed (HOB) Positioning: HOB at 30-45 degrees   Ulcer Prophylaxis GI: yes   Glucose control managed Glycemic Management: blood glucose monitored   Skin Skin assessed during: Daily Assessment    [] No Altered Skin Integrity Present    []Prevention Measures Documented      [] Yes- Altered Skin Integrity Present or Discovered   [] LDA Added if Not in Epic (Describe Wound)   [] New Altered Skin Integrity was Present on Admit and Documented in LDA   [] Wound Image Taken    Wound Care Consulted? Yes    Attending Nurse:  Stefany Lagunas RN/Staff Member:  Lev Kirby, RN   Bowel Function diarrhea    Indwelling Catheter Necessity      Percutaneous Central Line Insertion/Assessment - Triple Lumen  10/11/23 1501 Internal Jugular Left-Line Necessity Review: Hemodynamic instability  Yes and 2  PIV placed to LOW   De-escalation Antibiotics Yes       VS and assessment per flow sheet, patient progressing towards goals as tolerated, plan of care reviewed with Cosme Lewis, all concerns addressed, will continue to monitor.

## 2023-10-17 NOTE — ASSESSMENT & PLAN NOTE
[place]  [facility] [place]  4 [place]  EDW:  71 kg  UMA AVF    Plan/Recommendations:  -no further plans for RRT today  -next HD session tomorrow  -continue strict I/O's  -RFP daily  -renal diet when advanced, 1L fluid restrictions  -Phos WNL, no need for binders at this time

## 2023-10-17 NOTE — ASSESSMENT & PLAN NOTE
Patient with Hypercapnic and Hypoxic Respiratory failure which is Chronic.  he is not on home oxygen. Supplemental oxygen was provided and noted-  Signs/symptoms of respiratory failure include- tachypnea, increased work of breathing and use of accessory muscles. Contributing diagnoses includes - CHF, COPD and fluid volume excess Labs and images were reviewed. Patient Has recent ABG, which has been reviewed. Will treat underlying causes and adjust management of respiratory failure as follows-     58 year old male with multiple medical problems who [place] [facility] [place] for AMS, hypoglycemia, and hypoxia placed on NIPPV. Per chart review patient with room air saturation in 50s which improved with HFNC, however, he remained tachypnic with accessory muscle use, therefore he was placed on NIPPV. Unclear if patient requires oxygen at baseline. Previous hospitalizations with oxygen use. CXR with bilateral opacifications R > L and BNP elevated > 4900.     Intubated and mechanically ventilated, extubated to NC 10/13, sating well  Chest x-ray concerning for pneumonia vs ARDS    - Monitoring ABGs PRN  - Remdesivir and for dexamethasone 6 mg daily for COVID.  - CAP coverage with Vanc/Zosyn, day #7 (final day)   - Received HD 10/11, 10/12, 10/13, 10/15 and 10/16.   - Duoneb nebulizer, tiotropium and Fluticasone furoate / Vilanterol

## 2023-10-17 NOTE — PROGRESS NOTES
Therapy with vancomycin was discontinued by the provider.  Pharmacy will sign off, please re-consult as needed.    Thank you for the consult,   Gabriela Melara, PharmD, [facility]  [phone]

## 2023-10-18 NOTE — PLAN OF CARE
MICU DAILY GOALS     Family/Goals of care/Code Status   Code Status: Full Code    24H Vital Sign Range  Temp:  [96.1 °F (35.6 °C)-98.3 °F (36.8 °C)]   Pulse:  []   Resp:  [13-26]   BP: (116-183)/()   SpO2:  [66 %-100 %]      Shift Events   No acute events throughout shift; patient tolerated bubble flow 5L throughout shift; HD treatment completed- 3L removed; step down orders placed; WCTM.    AWAKE RASS: Goal - RASS Goal: 0-->alert and calm  Actual - RASS (Osorio Agitation-Sedation Scale): alert and calm    Restraint necessity: Not necessary   BREATHE SBT: Not intubated    Coordinate A & B, analgesics/sedatives Pain: managed   SAT: Not intubated   Delirium CAM-ICU: Overall CAM-ICU: Negative   Early(intubated/ Progressive (non-intubated) Mobility MOVE Screen (INTUBATED ONLY): Not intubated    Activity: Activity Management: Rolling - L1   Feeding/Nutrition Diet order: Diet/Nutrition Received: consistent carb/diabetic diet, mechanical/dental soft, Specialty Diet/Nutrition Received: renal diet   Thrombus DVT prophylaxis: VTE Required Core Measure: Pharmacological prophylaxis initiated/maintained   HOB Elevation Head of Bed (HOB) Positioning: HOB at 30-45 degrees   Ulcer Prophylaxis GI: yes   Glucose control managed Glycemic Management: blood glucose monitored   Skin Skin assessed during: Daily Assessment    [] No Altered Skin Integrity Present    []Prevention Measures Documented      [] Yes- Altered Skin Integrity Present or Discovered   [] LDA Added if Not in Epic (Describe Wound)   [] New Altered Skin Integrity was Present on Admit and Documented in LDA   [] Wound Image Taken    Wound Care Consulted? No    Attending Nurse:  Isabel Lagunas RN/Staff Member:  Lev Kirby, RN   Bowel Function no issues    Indwelling Catheter Necessity      [REMOVED] Percutaneous Central Line Insertion/Assessment - Triple Lumen  10/11/23 1501 Internal Jugular Left-Line Necessity Review: Hemodynamic instability  no    De-escalation Antibiotics No       VS and assessment per flow sheet, patient progressing towards goals as tolerated, plan of care reviewed with  patient , all concerns addressed, will continue to monitor.

## 2023-10-18 NOTE — PROGRESS NOTES
[facility] - [facility]  Nephrology  Progress Note    Patient Name: Cosme Lewis  MRN: [id number]  Admission Date: 10/11/2023  Hospital Length of Stay: 7 days  Attending Provider: Ricky Vasquez MD   Primary Care Physician: Eliecer Ohara III, MD  Principal Problem:Chronic hypoxemic respiratory failure    Subjective:     HPI: Cosme Lewis is a 57 yo male with PMHx of ESRD on iHD MWF who presents from his NH with AMS, hypoxia, and hypoglycemia.  He was placed on BiPAP and transferred to ICU for higher level of care.  CXR with evidence of pulmonary edema, BNP elevated > 3800.  Labs on chemistry without emergent electrolyte abnormalities consistent with ESRD status.  CBC without leukocytosis.  He was found to be COVID + and started on Broad spectrum Abx and steroids. He required D5W gtt for persistent hypoglycemia.  Nephrology has been consulted for ESRD management while IP.      Interval History:   NAEON.  Seen this morning on HD, tolerating well.  Eating breakfast in NAD.    Electrolytes non-emergent this morning.  On HFNC.    Review of patient's allergies indicates:  No Known Allergies  Current Facility-Administered Medications   Medication Frequency    0.9%  NaCl infusion Once    acetaminophen tablet 650 mg Q6H PRN    albuterol-ipratropium 2.5 mg-0.5 mg/3 mL nebulizer solution 3 mL Q4H PRN    carvediloL tablet 12.5 mg BID WM    dexAMETHasone injection 6 mg Daily    dextrose 10% bolus 125 mL 125 mL PRN    dextrose 10% bolus 250 mL 250 mL PRN    epoetin xavier-epbx injection 3,510 Units Every Mon, Wed, Fri    FLUoxetine capsule 40 mg Daily    fluticasone furoate-vilanteroL 100-25 mcg/dose diskus inhaler 1 puff Daily    glucagon (human recombinant) injection 1 mg PRN    glucose chewable tablet 16 g PRN    glucose chewable tablet 24 g PRN    hydrALAZINE injection 10 mg Q6H PRN    insulin aspart U-100 pen 0-10 Units QID (AC + HS) PRN    multivitamin tablet Daily    NIFEdipine 24 hr tablet 60 mg BID     sodium chloride 0.9% bolus 250 mL 250 mL PRN    tiotropium bromide 2.5 mcg/actuation inhaler 2 puff Daily       Objective:     Vital Signs (Most Recent):  Temp: 97.6 °F (36.4 °C) (10/18/23 0930)  Pulse: 65 (10/18/23 1000)  Resp: 15 (10/18/23 1000)  BP: (!) 144/87 (10/18/23 1000)  SpO2: 98 % (10/18/23 1000) Vital Signs (24h Range):  Temp:  [96.1 °F (35.6 °C)-98.1 °F (36.7 °C)] 97.6 °F (36.4 °C)  Pulse:  [57-72] 65  Resp:  [13-40] 15  SpO2:  [85 %-100 %] 98 %  BP: (118-183)/() 144/87     Weight: 70.2 kg (154 lb 12.2 oz) (10/14/23 0400)  Body mass index is 22.85 kg/m².  Body surface area is 1.85 meters squared.    I/O last 3 completed shifts:  In: 298.1 [IV Piggyback:298.1]  Out: 4500 [Other:4500]     Physical Exam  Vitals and nursing note reviewed.   Constitutional:       General: He is not in acute distress.     Appearance: He is well-developed. He is ill-appearing.      Interventions: Nasal cannula in place.   HENT:      Head: Normocephalic and atraumatic.      Nose: No congestion or rhinorrhea.   Eyes:      General: No scleral icterus.        Right eye: No discharge.         Left eye: No discharge.      Extraocular Movements: Extraocular movements intact.      Conjunctiva/sclera: Conjunctivae normal.   Neck:      Vascular: JVD present.   Cardiovascular:      Rate and Rhythm: Normal rate and regular rhythm.      Heart sounds: No murmur heard.     No friction rub. No gallop.      Comments: UMA AVLARRY  Pulmonary:      Effort: No respiratory distress.      Breath sounds: Rhonchi and rales present. No wheezing.   Abdominal:      General: There is no distension.      Palpations: Abdomen is soft.      Tenderness: There is no abdominal tenderness.   Musculoskeletal:         General: No swelling.      Cervical back: Normal range of motion.      Right lower leg: Edema present.      Left lower leg: Edema present.   Skin:     General: Skin is warm and dry.   Neurological:      Mental Status: He is alert. Mental status is  at baseline.          Significant Labs:  CBC:   Recent Labs   Lab 10/18/23  0439   WBC 2.82*   RBC 2.66*   HGB 7.8*   HCT 23.3*   PLT 39*   MCV 88   MCH 29.3   MCHC 33.5     CMP:   Recent Labs   Lab 10/18/23  0439   *   CALCIUM 7.8*   ALBUMIN 2.2*   PROT 5.3*      K 4.5   CO2 25      BUN 55*   CREATININE 3.0*   ALKPHOS 119   ALT 26   AST 28   BILITOT 1.6*          Assessment/Plan:     Renal/  ESRD (end stage renal disease)  ESRD on iHD [facility]  4 hours  EDW:  71 kg  UMA AVF    Plan/Recommendations:  -HD today   -UF 3L as tolerated  -continue strict I/O's  -RFP daily  -renal diet when advanced, 1L fluid restrictions  -Phos WNL, no need for binders at this time    Oncology  Anemia in ESRD (end-stage renal disease)  Below goal for ESRD  -EPO with HD        Juventino Nguyen, NP  Nephrology  [facility]

## 2023-10-18 NOTE — PROGRESS NOTES
Nick Menjivar - Cardiac Medical ICU  [facility] Medicine  IMQ Transfer Acceptance Note    Patient Name: Cosme Lewis  MRN: [id number]  Patient Class: IP- Inpatient   Admission Date: 10/11/2023  Length of Stay: 7 days  Attending Physician: Ricky Vasquez MD  Primary Care Provider: Eliecer Ohara III, MD        Subjective:     Principal Problem:Chronic hypoxemic respiratory failure        HPI:  No notes on file    Overview/Hospital Course:  58 year old male with PMH notable for COPD, HFrEF (45% and DD), ESRD on dialysis - MWF, HTN, DM presents via EMS from nursing home for altered mental status and hypoxia.  Per EMS, patient was found to have glucose of 38, he received D50 with improvement in his glucose.  He also had SpO2 58% which improved with 15 L nasal cannula.   ED work up revealed VBG 7.27/61 and he was subsequently placed on bipap for work of breathing. He was persistently hypoglycemic with BG 59 --> 34 despite IV dextrose, therefore he was started on a continuous dextrose infusion. Other lab work up revealed WBC 3, stable, H//H 9.5/29, Na 143, CO2 20, Cr/BUN 3.8/38, elevated AST/ALT/alk phos, and troponin 0.174 with no ischemic changes on EKG. He was given IV rocephin and doxycyline. Chest XR with bilateral opacifications R > L.  Critical care consulted for acute hypoxemic respiratory failure and NIPPV. Found to have COVID pneumonia with overlapping community acquired pneumonia.   ICU Course:  In the MICU, the patient arrived on bipap but was found to still be hypoxic which initially improved after adjusting his bipap settings. He continued to be altered and tried occasionally became combative requiring precedex which was affective when maxed out at 1.4 mcg/kg/hour. During this time he was also found to have worsening hypoglycemia which required continuous D10 administration at a max rate of 200 cc/hour. Eventually his oxygen status started declining and the decision was made to intubate the patient. It is believed  that worsening of his oxygen status was in part due the large volume of D10 he received to maintain his blood sugars. For this reason, it was decided that D20 at a slower rate would be more appropriate, and for this a central line was placed. Additionally, he received a round of hemodialysis. He is receiving remdesivir for COVID and vacomycin/zosyn/doxycycline for community acquired pneumonia coverage/concerning chest x-ray. The patient's sugars started rising on the D20 and eventually he started becoming hyperglycemic  to the 220s which led to the D20 being stopped for the time being and since that time the sugars have been relatively normoglycemic. His abdomen was found to be slightly more distended but KUB was negative for bowel obstruction, likely suggesting ascites as the source.  On 10/12 after passing SAT and SBT, the decision was made to extubate the patient which was done successfully. All sedatives were turned off at this time as well. He was placed on high flow at the time and has been sating well. He continued to receive antibiotics as well as antivirals for CAP coverage/COVID respectively which was able to help improve his likely pneumonia vs ARDS.      Between 10/14 - 10/17 he went back and forth between requiring high flow and bipap, however starting on 10/17 he was able to tolerate nasal canula.   10/18-  No significant overnight events, he states that he feels his breathing has improved. Will be transferred to [facility].         Interval History: see above    Review of Systems  Objective:     Vital Signs (Most Recent):  Temp: 96.8 °F (36 °C) (10/18/23 1100)  Pulse: 70 (10/18/23 1400)  Resp: 16 (10/18/23 1400)  BP: 139/75 (10/18/23 1400)  SpO2: (!) 94 % (10/18/23 1400) Vital Signs (24h Range):  Temp:  [96.1 °F (35.6 °C)-97.6 °F (36.4 °C)] 96.8 °F (36 °C)  Pulse:  [57-72] 70  Resp:  [13-26] 16  SpO2:  [82 %-100 %] 94 %  BP: (116-183)/() 139/75     Weight: 70.2 kg (154 lb 12.2 oz)  Body mass index  is 22.85 kg/m².    Intake/Output Summary (Last 24 hours) at 10/18/2023 1509  Last data filed at 10/18/2023 1300  Gross per 24 hour   Intake 15.42 ml   Output 3500 ml   Net -3484.58 ml         Physical Exam        Significant Labs: All pertinent labs within the past 24 hours have been reviewed.  CBC:   Recent Labs   Lab 10/17/23  0417 10/18/23  0439   WBC 3.65* 2.82*   HGB 7.8* 7.8*   HCT 23.6* 23.3*   PLT 35* 39*     CMP:   Recent Labs   Lab 10/17/23  0417 10/18/23  0439    141   K 3.8 4.5    100   CO2 25 25    169*   BUN 36* 55*   CREATININE 2.2* 3.0*   CALCIUM 7.8* 7.8*   PROT 5.3* 5.3*   ALBUMIN 2.4* 2.2*   BILITOT 1.8* 1.6*   ALKPHOS 130 119   AST 35 28   ALT 31 26   ANIONGAP 15 16       Significant Imaging: I have reviewed all pertinent imaging results/findings within the past 24 hours.      Assessment/Plan:      * Chronic hypoxemic respiratory failure  Patient with Hypercapnic and Hypoxic Respiratory failure which is Chronic.  he is not on home oxygen. Supplemental oxygen was provided and noted-  Signs/symptoms of respiratory failure include- tachypnea, increased work of breathing and use of accessory muscles. Contributing diagnoses includes - CHF, COPD and fluid volume excess Labs and images were reviewed. Patient Has recent ABG, which has been reviewed. Will treat underlying causes and adjust management of respiratory failure as follows-      58 year old male with multiple medical problems who [place] [facility] [place] for AMS, hypoglycemia, and hypoxia placed on NIPPV. Per chart review patient with room air saturation in 50s which improved with HFNC, however, he remained tachypnic with accessory muscle use, therefore he was placed on NIPPV. Unclear if patient requires oxygen at baseline. Previous hospitalizations with oxygen use. CXR with bilateral opacifications R > L and BNP elevated > 4900.      Admitted to ICU, Intubated and mechanically ventilated, extubated to NC 10/13, sating  well  Chest x-ray concerning for pneumonia vs ARDS     - Monitoring ABGs PRN  - Treated for COVID-19 pneumonia: Remdesivir completed. Dexamethasone 6 mg daily  - CAP coverage with Vanc/Zosyn completed   - Received HD 10/11, 10/12, 10/13, 10/15 and 10/16.   - Duoneb nebulizer, tiotropium and Fluticasone furoate / Vilanterol     Acute respiratory failure with hypoxia and hypercapnia  Patient with Hypoxic Respiratory failure which is Acute.  he is not on home oxygen. Supplemental oxygen was provided and noted.  Signs/symptoms of respiratory failure include- respiratory distress. Contributing diagnoses includes - ARDS Labs and images were reviewed. Patient Has recent ABG, which has been reviewed. Will treat underlying causes and adjust management of respiratory failure as follows- continuing antibiotics and antivirals, adjustments of mechanical ventilation, HD and fluid regulation.      Extubated in ICU to 5L NC morning of 10/13. In no acute distress  VBG 7.2552  However mental status improving  -continue to monitor on NC  -ABG as warranted  -Currently on nasal canula         COPD (chronic obstructive pulmonary disease)  --Duo nebs Q4wake  --Currently on nasal canula  --Fluticasone furoate / Vilanterol and Salmeterol     Hyperglycemia due to type 2 diabetes mellitus  Recent Labs     10/16/23  2007 10/16/23  2307 10/17/23  0723 10/17/23  1140 10/17/23  1547 10/18/23  0907   POCTGLUCOSE 155* 142* 134* 183* 172* 188*     On low dose SS  Hypoglycemia protocol      ESRD (end stage renal disease)  Nephrology following  Outpatient HD [facility]: [facility]   HD tx days: MWF   HD tx time: 4hrs   HD access: LUE AVF   HD modality: iHD   Residual urine: Anuric   Estimated Creatinine Clearance: 33.8 mL/min (A) (based on SCr of 2.3 mg/dL (H)).   Toelrating iHD well     -- Nephrology consulted for iHD management. Appreciate their assistance   -- CMP daily and trend   -- Avoid nephrotoxins   -- Renally dose all medications to  appropriate GFR / CrCl   -- Hgb > 7 and MAP > 65 goals     Chronic diastolic heart failure    ECHO 09/23:        Left Ventricle: The left ventricle is normal in size. Increased wall thickness. There is concentric hypertrophy. Mild global hypokinesis present. There is mildly reduced systolic function with a visually estimated ejection fraction of 40 - 45%. There is indeterminate diastolic function.    Right Ventricle: Normal right ventricular cavity size. Right ventricle wall motion  is normal. Systolic function is borderline low.    Left Atrium: Left atrium is severely dilated.    Tricuspid Valve: There is mild regurgitation.    Pericardium: There is a trivial effusion. Echodensity seen adjacent to the visceral pericardium of uncertain significance. This may be pericardial fat although other etiologies not excluded. Correlate clinically.    PASP is at least 46 mmHg.     BNP > 4900     --Volume removal per iHD. Appreciate nephrology assistance   --Blood pressure started becoming elevated on 10/17. Home nifedipine and lisinopril restarted as pressures have been within normal limits. Hydralazine pushes prn should pressures rise.     Anemia in ESRD (end-stage renal disease)        Recent Labs   Lab 10/18/23  0439   WBC 2.82*   RBC 2.66*   HGB 7.8*   HCT 23.3*   PLT 39*   MCV 88   MCH 29.3   MCHC 33.5      -- CBC daily and trend   -- Transfuse for hgb < 7   -- Receives EPO with iHD    History of pulmonary embolism  Diagnosed 10/2022  Repeat CT 3/2023 with resolution     -hold home eliquis for mental status change  -heparin stopped as patient's platelet level fell to 35,000    HLD (hyperlipidemia)   LFT's have improved, will restart atorvastatin 40 mg daily        VTE Risk Mitigation (From admission, onward)         Ordered     IP VTE HIGH RISK PATIENT  Once         10/11/23 0248     Place sequential compression device  Until discontinued         10/11/23 0248                Discharge Planning   GERA: 10/20/2023      Code Status: Full Code   Is the patient medically ready for discharge?:     Reason for patient still in hospital (select all that apply): Patient trending condition  Discharge Plan A: Return to nursing home   Discharge Delays: None known at this time      Dahlia Balbuena MD  Department of Hospital Medicine   [facility] - Cardiac Medical ICU

## 2023-10-18 NOTE — SUBJECTIVE & OBJECTIVE
Interval History/Significant Events: No significant overnight events, he states that he feels his breathing has improved.    Review of Systems   Unable to perform ROS: Acuity of condition     Objective:     Vital Signs (Most Recent):  Temp: 96.8 °F (36 °C) (10/18/23 1100)  Pulse: 69 (10/18/23 1300)  Resp: 16 (10/18/23 1300)  BP: 137/78 (10/18/23 1300)  SpO2: 97 % (10/18/23 1300) Vital Signs (24h Range):  Temp:  [96.1 °F (35.6 °C)-97.7 °F (36.5 °C)] 96.8 °F (36 °C)  Pulse:  [57-72] 69  Resp:  [13-40] 16  SpO2:  [82 %-100 %] 97 %  BP: (116-183)/() 137/78   Weight: 70.2 kg (154 lb 12.2 oz)  Body mass index is 22.85 kg/m².      Intake/Output Summary (Last 24 hours) at 10/18/2023 1325  Last data filed at 10/18/2023 1300  Gross per 24 hour   Intake 65.38 ml   Output 3500 ml   Net -3434.62 ml          Physical Exam  Vitals and nursing note reviewed.   Constitutional:       General: He is not in acute distress.     Appearance: He is ill-appearing.      Comments: Increased responsiveness and able to communicate/formulate coherent words   HENT:      Head: Normocephalic and atraumatic.      Right Ear: External ear normal.      Left Ear: External ear normal.      Nose: Nose normal.   Eyes:      General:         Right eye: No discharge.         Left eye: No discharge.      Pupils: Pupils are equal, round, and reactive to light.   Cardiovascular:      Rate and Rhythm: Normal rate and regular rhythm.   Pulmonary:      Effort: Pulmonary effort is normal.      Comments: Improved breathing status, minimal crackles auscultated in the lung fields   Abdominal:      General: There is distension.      Tenderness: There is no abdominal tenderness.      Comments: Some degree of distension still present, however improved from previous days   Musculoskeletal:         General: No swelling or deformity.   Skin:     General: Skin is warm.      Coloration: Skin is not jaundiced.   Psychiatric:         Mood and Affect: Mood normal.             Vents:  Vent Mode: Spont (10/12/23 1503)  Set Rate: 20 BPM (10/12/23 1141)  Vt Set: 380 mL (10/12/23 1141)  Pressure Support: 5 cmH20 (10/12/23 1503)  PEEP/CPAP: 5 cmH20 (10/12/23 1503)  Oxygen Concentration (%): 10 (10/18/23 0900)  Peak Airway Pressure: 11 cmH20 (10/12/23 1503)  Plateau Pressure: 20 cmH20 (10/12/23 1503)  Total Ve: 12.8 L/m (10/12/23 1503)  Negative Inspiratory Force (cm H2O): 0 (10/12/23 1503)  F/VT Ratio<105 (RSBI): (!) 97.83 (10/12/23 1503)  Lines/Drains/Airways       Peripheral Intravenous Line  Duration                  Hemodialysis AV Fistula Left upper arm -- days         Peripheral IV - Single Lumen 10/16/23 1709 20 G;1 3/4 in Anterior;Right Upper Arm 1 day         Peripheral IV - Single Lumen 10/16/23 1710 20 G;1 3/4 in Anterior;Right Upper Arm 1 day                  Significant Labs:    CBC/Anemia Profile:  Recent Labs   Lab 10/17/23  0417 10/18/23  0439   WBC 3.65* 2.82*   HGB 7.8* 7.8*   HCT 23.6* 23.3*   PLT 35* 39*   MCV 87 88   RDW 16.9* 17.2*        Chemistries:  Recent Labs   Lab 10/17/23  0417 10/18/23  0439    141   K 3.8 4.5    100   CO2 25 25   BUN 36* 55*   CREATININE 2.2* 3.0*   CALCIUM 7.8* 7.8*   ALBUMIN 2.4* 2.2*   PROT 5.3* 5.3*   BILITOT 1.8* 1.6*   ALKPHOS 130 119   ALT 31 26   AST 35 28   MG 2.1 2.1   PHOS 3.4 5.2*       All pertinent labs within the past 24 hours have been reviewed.    Significant Imaging:  I have reviewed all pertinent imaging results/findings within the past 24 hours.

## 2023-10-18 NOTE — ASSESSMENT & PLAN NOTE
Recent Labs     10/16/23  2007 10/16/23  2307 10/17/23  0723 10/17/23  1140 10/17/23  1547 10/18/23  0907   POCTGLUCOSE 155* 142* 134* 183* 172* 188*     On low dose SS  Hypoglycemia protocol

## 2023-10-18 NOTE — PHYSICIAN QUERY
"PT Name: Cosme Lewis  MR #: [id number]    DOCUMENTATION CLARIFICATION      CDS/: Neftali Parker RN, CCDS               Contact information:    [email or web address]       This form is a permanent document in the medical record.      Query Date: October 18, 2023    By submitting this query, we are merely seeking further clarification of documentation. Please utilize your independent clinical judgment when addressing the question(s) below.       Indicators Supporting Clinical Findings Location in Medical Record    Anemia, Thrombocytopenia, Neutropenia, Pancytopenia documented     x H&H 10/11  HH:  9.5/  29.5;    8.2/  26.3  10/17  HH:  7.8/  23.6  10/18   HH: 7.8/  23.3     Labs   x WBC 10/11   WBC: 3.50,    2 .81  10/17  WBC:    3.65  10/18   WBC:  2.82   Labs    x Neutrophils/ Granulocytes/ ANC 10/11   ANC:  2.4,  2.2  10/17  ANC:  2.9  10/18  ANC: 2.2   Labs     x Platelets 10/11   PLTs:    168,  145  10/17      "      :  35  10/18       "    :  39   Labs     Transfusion(s)      Treatments:     x Acute/ Chronic illness ED work up revealed VBG 7.27/61 and he was subsequently placed on bipap for work of breathing. He was persistently hypoglycemic with BG 59 --> 34 despite IV dextrose    PMH notable for COPD, HFrEF (45% and DD), ESRD on dialysis, HTN, DM    10/11  H&P: CCM    Other:   hold home eliquis for mental status change  -heparin stopped as patient's platelet level fell to 35,000 10/18 CCM:    Pancytopenia is defined by:  Hb < 12g/dL (non-pregnant women) or <13g/dL (men) + ANC < 1800/microL + Platelets < 150,000/microL    The clinical guidelines noted are only a system guideline. It does not replace the providers clinical judgment.      Provider, please specify diagnosis or diagnoses associated with above clinical findings.    [   ] Pancytopenia due to other drug, Eliquis/ Heparin   [ x  ] Pancytopenia, unspecified   [   ] Other Hematological Diagnosis (please specify): ________   [   ]   " Other (please specify): ____________       Please document in your progress notes daily for the duration of treatment, until resolved, and include in your discharge summary.    Reference:    KATIE Garcia). Approach to the adult with pancytopenia. [employer]. Retrieved September 7, 2022, from [email or web address]/approach-to-the-adult-with-pancytopenia?search=pancytopenia&source=search_result&selectedTitle=1~150&usage_type=default&display_rank=1    Form No. 43116

## 2023-10-18 NOTE — ASSESSMENT & PLAN NOTE
Patient with Hypoxic Respiratory failure which is Acute.  he is not on home oxygen. Supplemental oxygen was provided and noted.  Signs/symptoms of respiratory failure include- respiratory distress. Contributing diagnoses includes - ARDS Labs and images were reviewed. Patient Has recent ABG, which has been reviewed. Will treat underlying causes and adjust management of respiratory failure as follows- continuing antibiotics and antivirals, adjustments of mechanical ventilation, HD and fluid regulation.      Extubated in ICU to 5L NC morning of 10/13. In no acute distress  VBG 7.2552  However mental status improving  -continue to monitor on NC  -ABG as warranted  -Currently on nasal canula

## 2023-10-18 NOTE — PLAN OF CARE
Recommendations     Continue Minced & Moist (level 5), Diabetic diet - add Renal diet restrictions if necessary.  RD to monitor & follow-up.     Goals: Meet % EEN, EPN by RD f/u date  Nutrition Goal Status: goal met  Communication of RD Recs: discussed on rounds

## 2023-10-18 NOTE — ASSESSMENT & PLAN NOTE
Recent Labs   Lab 10/18/23  0439   WBC 2.82*   RBC 2.66*   HGB 7.8*   HCT 23.3*   PLT 39*   MCV 88   MCH 29.3   MCHC 33.5      -- CBC daily and trend   -- Transfuse for hgb < 7   -- Receives EPO with iHD

## 2023-10-18 NOTE — HOSPITAL COURSE
58 year old male with PMH notable for COPD, HFrEF (45% and DD), ESRD on dialysis - MWF, HTN, DM presents via EMS from nursing home for altered mental status and hypoxia.  Per EMS, patient was found to have glucose of 38, he received D50 with improvement in his glucose.  He also had SpO2 58% which improved with 15 L nasal cannula.   ED work up revealed VBG 7.27/61 and he was subsequently placed on bipap for work of breathing. He was persistently hypoglycemic with BG 59 --> 34 despite IV dextrose, therefore he was started on a continuous dextrose infusion. Other lab work up revealed WBC 3, stable, H//H 9.5/29, Na 143, CO2 20, Cr/BUN 3.8/38, elevated AST/ALT/alk phos, and troponin 0.174 with no ischemic changes on EKG. He was given IV rocephin and doxycyline. Chest XR with bilateral opacifications R > L.  Critical care consulted for acute hypoxemic respiratory failure and NIPPV. Found to have COVID pneumonia with overlapping community acquired pneumonia.   ICU Course:  In the MICU, the patient arrived on bipap but was found to still be hypoxic which initially improved after adjusting his bipap settings. He continued to be altered and tried occasionally became combative requiring precedex which was affective when maxed out at 1.4 mcg/kg/hour. During this time he was also found to have worsening hypoglycemia which required continuous D10 administration at a max rate of 200 cc/hour. Eventually his oxygen status started declining and the decision was made to intubate the patient. It is believed that worsening of his oxygen status was in part due the large volume of D10 he received to maintain his blood sugars. For this reason, it was decided that D20 at a slower rate would be more appropriate, and for this a central line was placed. Additionally, he received a round of hemodialysis. He is receiving remdesivir for COVID and vacomycin/zosyn/doxycycline for community acquired pneumonia coverage/concerning chest x-ray. The  patient's sugars started rising on the D20 and eventually he started becoming hyperglycemic  to the 220s which led to the D20 being stopped for the time being and since that time the sugars have been relatively normoglycemic. His abdomen was found to be slightly more distended but KUB was negative for bowel obstruction, likely suggesting ascites as the source.  On 10/12 after passing SAT and SBT, the decision was made to extubate the patient which was done successfully. All sedatives were turned off at this time as well. He was placed on high flow at the time and has been sating well. He continued to receive antibiotics as well as antivirals for CAP coverage/COVID respectively which was able to help improve his likely pneumonia vs ARDS. Between 10/14 - 10/17 he went back and forth between requiring high flow and bipap, however starting on 10/17 he was able to tolerate nasal canula.     Stepped down to hospital medicine. Having intermittent delirium overnight pulling at O2, requiring placement of restraints. Transitioned to HFNC given desaturations. Delirium precautions added.   Endocrine following and adjusting insulin given intermittent hypoglycemia resolved. Weaned to regular NC and out of restraints. BG stable with stopping levemir. Stable for discharge back to NH.

## 2023-10-18 NOTE — ASSESSMENT & PLAN NOTE
Patient with Hypercapnic and Hypoxic Respiratory failure which is Chronic.  he is not on home oxygen. Supplemental oxygen was provided and noted-  Signs/symptoms of respiratory failure include- tachypnea, increased work of breathing and use of accessory muscles. Contributing diagnoses includes - CHF, COPD and fluid volume excess Labs and images were reviewed. Patient Has recent ABG, which has been reviewed. Will treat underlying causes and adjust management of respiratory failure as follows-     58 year old male with multiple medical problems who [place] [facility] [place] for [place]MS, hypoglycemia, and hypoxia placed on NIPPV. Per chart review patient with room air saturation in 50s which improved with HFNC, however, he remained tachypnic with accessory muscle use, therefore he was placed on NIPPV. Unclear if patient requires oxygen at baseline. Previous hospitalizations with oxygen use. CXR with bilateral opacifications R > L and BNP elevated > 4900.     Intubated and mechanically ventilated, extubated to NC 10/13, sating well  Chest x-ray concerning for pneumonia vs ARDS    - Monitoring ABGs PRN  - Remdesivir completed. Dexamethasone 6 mg daily  - CAP coverage with Vanc/Zosyn completed   - Received HD 10/11, 10/12, 10/13, 10/15 and 10/16.   - Duoneb nebulizer, tiotropium and Fluticasone furoate / Vilanterol

## 2023-10-18 NOTE — ASSESSMENT & PLAN NOTE
Nephrology following  Outpatient HD unit: [facility]   HD tx days: MWF   HD tx time: 4hrs   HD access: LUE AVF   HD modality: iHD   Residual urine: Anuric   Estimated Creatinine Clearance: 33.8 mL/min (A) (based on SCr of 2.3 mg/dL (H)).   Toelrating iHD well     -- Nephrology consulted for iHD management. Appreciate their assistance   -- CMP daily and trend   -- Avoid nephrotoxins   -- Renally dose all medications to appropriate GFR / CrCl   -- Hgb > 7 and MAP > 65 goals

## 2023-10-18 NOTE — ASSESSMENT & PLAN NOTE
S/p IV d 20 drip in ICU  Recent Labs     10/16/23  2007 10/16/23  2307 10/17/23  0723 10/17/23  1140 10/17/23  1547 10/18/23  0907   POCTGLUCOSE 155* 142* 134* 183* 172* 188*     -Currently on sliding scale insulin and insulin aspart with meals   RESOLVED

## 2023-10-18 NOTE — ASSESSMENT & PLAN NOTE
--Duo nebs Q4wake  --Currently on nasal canula  --Fluticasone furoate / Vilanterol and Salmeterol

## 2023-10-18 NOTE — PROGRESS NOTES
[facility] - Cardiac Medical ICU  Critical Care Medicine  Progress Note    Patient Name: Cosme Lewis  MRN: [id number]  Admission Date: 10/11/2023  Hospital Length of Stay: 7 days  Code Status: Full Code  Attending Provider: Ricky Vasquez MD  Primary Care Provider: Eliecer Ohara III, MD   Principal Problem: Chronic hypoxemic respiratory failure    Subjective:     HPI:  Mr. Lewis is a 58 year old male with PMH notable for COPD, HFrEF (45% and DD), ESRD on dialysis, HTN, DM presents via EMS from nursing home for altered mental status and hypoxia.  Per EMS, patient was found to have glucose of 38, he received D50 with improvement in his glucose.  He also had SpO2 58% which improved with 15 L nasal cannula.     ED work up revealed VBG 7.27/61 and he was subsequently placed on bipap for work of breathing. He was persistently hypoglycemic with BG 59 --> 34 despite IV dextrose, therefore he was started on a continuous dextrose infusion. Other lab work up revealed WBC 3, stable, H//H 9.5/29, Na 143, CO2 20, Cr/BUN 3.8/38, elevated AST/ALT/alk phos, and troponin 0.174 with no ischemic changes on EKG. He was given IV rocephin and doxycyline. Chest XR with bilateral opacifications R > L.    Critical care consulted for acute hypoxemic respiratory failure and NIPPV      Hospital/ICU Course:  In the MICU, the patient arrived on bipap but was found to still be hypoxic which initially improved after adjusting his bipap settings. He continued to be altered and tried occasionally became combative requiring precedex which was affective when maxed out at 1.4 mcg/kg/hour. During this time he was also found to have worsening hypoglycemia which required continuous D10 administration at a max rate of 200 cc/hour. Eventually his oxygen status started declining and the decision was made to intubate the patient. It is believed that worsening of his oxygen status was in part due the large volume of D10 he received to maintain his blood sugars. For  this reason, it was decided that D20 at a slower rate would be more appropriate, and for this a central line was placed. Additionally, he received a round of hemodialysis. He is receiving remdesivir for COVID and vacomycin/zosyn/doxycycline for community acquired pneumonia coverage/concerning chest x-ray. The patient's sugars started rising on the D20 and eventually he started becoming hyperglycemic  to the 220s which led to the D20 being stopped for the time being and since that time the sugars have been relatively normoglycemic. His abdomen was found to be slightly more distended but KUB was negative for bowel obstruction, likely suggesting ascites as the source.    On 10/12 after passing SAT and SBT, the decision was made to extubate the patient which was done successfully. All sedatives were turned off at this time as well. He was placed on high flow at the time and has been sating well. He continued to receive antibiotics as well as antivirals for CAP coverage/COVID respectively which was able to help improve his likely pneumonia vs ARDS.     Between 10/14 - 10/17 he went back and forth between requiring high flow and bipap, however starting on 10/17 he was able to tolerate nasal canula.         Interval History/Significant Events: No significant overnight events, he states that he feels his breathing has improved.    Review of Systems   Unable to perform ROS: Acuity of condition     Objective:     Vital Signs (Most Recent):  Temp: 96.8 °F (36 °C) (10/18/23 1100)  Pulse: 69 (10/18/23 1300)  Resp: 16 (10/18/23 1300)  BP: 137/78 (10/18/23 1300)  SpO2: 97 % (10/18/23 1300) Vital Signs (24h Range):  Temp:  [96.1 °F (35.6 °C)-97.7 °F (36.5 °C)] 96.8 °F (36 °C)  Pulse:  [57-72] 69  Resp:  [13-40] 16  SpO2:  [82 %-100 %] 97 %  BP: (116-183)/() 137/78   Weight: 70.2 kg (154 lb 12.2 oz)  Body mass index is 22.85 kg/m².      Intake/Output Summary (Last 24 hours) at 10/18/2023 1325  Last data filed at 10/18/2023  1300  Gross per 24 hour   Intake 65.38 ml   Output 3500 ml   Net -3434.62 ml          Physical Exam  Vitals and nursing note reviewed.   Constitutional:       General: He is not in acute distress.     Appearance: He is ill-appearing.      Comments: Increased responsiveness and able to communicate/formulate coherent words   HENT:      Head: Normocephalic and atraumatic.      Right Ear: External ear normal.      Left Ear: External ear normal.      Nose: Nose normal.   Eyes:      General:         Right eye: No discharge.         Left eye: No discharge.      Pupils: Pupils are equal, round, and reactive to light.   Cardiovascular:      Rate and Rhythm: Normal rate and regular rhythm.   Pulmonary:      Effort: Pulmonary effort is normal.      Comments: Improved breathing status, minimal crackles auscultated in the lung fields   Abdominal:      General: There is distension.      Tenderness: There is no abdominal tenderness.      Comments: Some degree of distension still present, however improved from previous days   Musculoskeletal:         General: No swelling or deformity.   Skin:     General: Skin is warm.      Coloration: Skin is not jaundiced.   Psychiatric:         Mood and Affect: Mood normal.            Vents:  Vent Mode: Spont (10/12/23 1503)  Set Rate: 20 BPM (10/12/23 1141)  Vt Set: 380 mL (10/12/23 1141)  Pressure Support: 5 cmH20 (10/12/23 1503)  PEEP/CPAP: 5 cmH20 (10/12/23 1503)  Oxygen Concentration (%): 10 (10/18/23 0900)  Peak Airway Pressure: 11 cmH20 (10/12/23 1503)  Plateau Pressure: 20 cmH20 (10/12/23 1503)  Total Ve: 12.8 L/m (10/12/23 1503)  Negative Inspiratory Force (cm H2O): 0 (10/12/23 1503)  F/VT Ratio<105 (RSBI): (!) 97.83 (10/12/23 1503)  Lines/Drains/Airways       Peripheral Intravenous Line  Duration                  Hemodialysis AV Fistula Left upper arm -- days         Peripheral IV - Single Lumen 10/16/23 1709 20 G;1 3/4 in Anterior;Right Upper Arm 1 day         Peripheral IV - Single  Lumen 10/16/23 1710 20 G;1 3/4 in Anterior;Right Upper Arm 1 day                  Significant Labs:    CBC/Anemia Profile:  Recent Labs   Lab 10/17/23  0417 10/18/23  0439   WBC 3.65* 2.82*   HGB 7.8* 7.8*   HCT 23.6* 23.3*   PLT 35* 39*   MCV 87 88   RDW 16.9* 17.2*        Chemistries:  Recent Labs   Lab 10/17/23  0417 10/18/23  0439    141   K 3.8 4.5    100   CO2 25 25   BUN 36* 55*   CREATININE 2.2* 3.0*   CALCIUM 7.8* 7.8*   ALBUMIN 2.4* 2.2*   PROT 5.3* 5.3*   BILITOT 1.8* 1.6*   ALKPHOS 130 119   ALT 31 26   AST 35 28   MG 2.1 2.1   PHOS 3.4 5.2*       All pertinent labs within the past 24 hours have been reviewed.    Significant Imaging:  I have reviewed all pertinent imaging results/findings within the past 24 hours.      ABG  Recent Labs   Lab 10/15/23  1310   PH 7.352   PO2 40   PCO2 46.3*   HCO3 25.7   BE 0     Assessment/Plan:     Pulmonary  * Chronic hypoxemic respiratory failure  Patient with Hypercapnic and Hypoxic Respiratory failure which is Chronic.  he is not on home oxygen. Supplemental oxygen was provided and noted-  Signs/symptoms of respiratory failure include- tachypnea, increased work of breathing and use of accessory muscles. Contributing diagnoses includes - CHF, COPD and fluid volume excess Labs and images were reviewed. Patient Has recent ABG, which has been reviewed. Will treat underlying causes and adjust management of respiratory failure as follows-     58 year old male with multiple medical problems who [place] [facility] [place] for AMS, hypoglycemia, and hypoxia placed on NIPPV. Per chart review patient with room air saturation in 50s which improved with HFNC, however, he remained tachypnic with accessory muscle use, therefore he was placed on NIPPV. Unclear if patient requires oxygen at baseline. Previous hospitalizations with oxygen use. CXR with bilateral opacifications R > L and BNP elevated > 4900.     Intubated and mechanically ventilated, extubated to NC 10/13,  sating well  Chest x-ray concerning for pneumonia vs ARDS    - Monitoring ABGs PRN  - Remdesivir completed. Dexamethasone 6 mg daily  - CAP coverage with Vanc/Zosyn completed   - Received HD 10/11, 10/12, 10/13, 10/15 and 10/16.   - Duoneb nebulizer, tiotropium and Fluticasone furoate / Vilanterol     Acute respiratory failure with hypoxia and hypercapnia  Patient with Hypoxic Respiratory failure which is Acute.  he is not on home oxygen. Supplemental oxygen was provided and noted.  Signs/symptoms of respiratory failure include- respiratory distress. Contributing diagnoses includes - ARDS Labs and images were reviewed. Patient Has recent ABG, which has been reviewed. Will treat underlying causes and adjust management of respiratory failure as follows- continuing antibiotics and antivirals, adjustments of mechanical ventilation, HD and fluid regulation.     Extubated to 5L NC morning of 10/13. In no acute distress  VBG 7.2552  However mental status improving    -continue to monitor on NC  -ABG as warranted  -Currently on nasal canula    COPD (chronic obstructive pulmonary disease)  --Duo nebs Q4wake  --Currently on nasal canula  --Fluticasone furoate / Vilanterol and Salmeterol     Cardiac/Vascular  HLD (hyperlipidemia)  -- LFT's have improved, will restart atorvastatin 40 mg daily      Chronic diastolic heart failure  ECHO 09/23:       Left Ventricle: The left ventricle is normal in size. Increased wall thickness. There is concentric hypertrophy. Mild global hypokinesis present. There is mildly reduced systolic function with a visually estimated ejection fraction of 40 - 45%. There is indeterminate diastolic function.    Right Ventricle: Normal right ventricular cavity size. Right ventricle wall motion  is normal. Systolic function is borderline low.    Left Atrium: Left atrium is severely dilated.    Tricuspid Valve: There is mild regurgitation.    Pericardium: There is a trivial effusion. Echodensity seen  adjacent to the visceral pericardium of uncertain significance. This may be pericardial fat although other etiologies not excluded. Correlate clinically.    PASP is at least 46 mmHg.    BNP > 4900    --Volume removal per iHD. Appreciate nephrology assistance   --Blood pressure started becoming elevated on 10/17. Home nifedipine and lisinopril restarted as pressures have been within normal limits. Hydralazine pushes prn should pressures rise.     Renal/  ESRD (end stage renal disease)  Outpatient HD [facility]: [facility]   HD tx days: MWF   HD tx time: 4hrs   HD access: LUE AVF   HD modality: iHD   Residual urine: Anuric   Estimated Creatinine Clearance: 33.8 mL/min (A) (based on SCr of 2.3 mg/dL (H)).    Toelrating iHD well    -- Nephrology consulted for iHD management. Appreciate their assistance   -- CMP daily and trend   -- Avoid nephrotoxins   -- Renally dose all medications to appropriate GFR / CrCl   -- Hgb > 7 and MAP > 65 goals     Hematology  History of pulmonary embolism  Diagnosed 10/2022  Repeat CT 3/2023 with resolution    -hold home eliquis for mental status change  -heparin stopped as patient's platelet level fell to 35,000    Oncology  Anemia in ESRD (end-stage renal disease)  Recent Labs   Lab 10/18/23  0439   WBC 2.82*   RBC 2.66*   HGB 7.8*   HCT 23.3*   PLT 39*   MCV 88   MCH 29.3   MCHC 33.5     -- CBC daily and trend   -- Transfuse for hgb < 7   -- Receives EPO with iHD    Endocrine  Hyperglycemia due to type 2 diabetes mellitus  -Currently on sliding scale insulin and insulin aspart with meals        Critical Care Daily Checklist:    A: Awake: RASS Goal/Actual Goal: RASS Goal: 0-->alert and calm  Actual:     B: Spontaneous Breathing Trial Performed? Spon. Breathing Trial Initiated?: Not initiated (10/12/23 0742)   C: SAT & SBT Coordinated?  passed                      D: Delirium: CAM-ICU Overall CAM-ICU: Negative   E: Early Mobility Performed? No   F: Feeding Goal: Goals: Meet %  NELDA DIALLON by RD f/u date  Status: Nutrition Goal Status: goal met   Current Diet Order   Procedures    Diet diabetic Minced & Moist (IDDSI Level 5); 2000 Calorie     Order Specific Question:   Additional Diet Options:     Answer:   Minced & Moist (IDDSI Level 5)     Order Specific Question:   Total calories:     Answer:   2000 Calorie      AS: Analgesia/Sedation None   T: Thromboembolic Prophylaxis No   H: HOB > 300 Yes   U: Stress Ulcer Prophylaxis (if needed) None   G: Glucose Control Insulin aspart with meals, sliding scale insulin   B: Bowel Function Stool Occurrence: 0   I: Indwelling Catheter (Lines & Dutta) Necessity PIVs   D: De-escalation of Antimicrobials/Pharmacotherapies Antibitocs/antiviral stopped as they have completed their 10 course    Plan for the day/ETD Stepdown, Respiratory and blood pressure management    Code Status:  Family/Goals of Care: Full Code          Critical care was time spent personally by me on the following activities: development of treatment plan with patient or surrogate and bedside caregivers, discussions with consultants, evaluation of patient's response to treatment, examination of patient, ordering and performing treatments and interventions, ordering and review of laboratory studies, ordering and review of radiographic studies, pulse oximetry, re-evaluation of patient's condition. This critical care time did not overlap with that of any other provider or involve time for any procedures.     Robbie Fields MD  Critical Care Medicine  [facility] - Cardiac Medical ICU

## 2023-10-18 NOTE — ASSESSMENT & PLAN NOTE
ESRD on iHD MWF  [facility]  4 hours  EDW:  71 kg  UMA AVF    Plan/Recommendations:  -HD today   -UF 3L as tolerated  -continue strict I/O's  -RFP daily  -renal diet when advanced, 1L fluid restrictions  -Phos WNL, no need for binders at this time

## 2023-10-18 NOTE — ASSESSMENT & PLAN NOTE
Patient has hyponatremia which is controlled,We will aim to correct the sodium by 4-6mEq in 24 hours. We will monitor sodium Daily. The hyponatremia is due to renal insufficiency. We will obtain the following studies: see lab and ICU notes. We will treat the hyponatremia with Hemodialysis. The patient's sodium results have been reviewed and are listed below.  Recent Labs   Lab 10/18/23  0439

## 2023-10-18 NOTE — ASSESSMENT & PLAN NOTE
Diagnosed 10/2022  Repeat CT 3/2023 with resolution    -hold home eliquis for mental status change  -heparin stopped as patient's platelet level fell to 35,000

## 2023-10-18 NOTE — ASSESSMENT & PLAN NOTE
Patient with Hypercapnic and Hypoxic Respiratory failure which is Chronic.  he is not on home oxygen. Supplemental oxygen was provided and noted-  Signs/symptoms of respiratory failure include- tachypnea, increased work of breathing and use of accessory muscles. Contributing diagnoses includes - CHF, COPD and fluid volume excess Labs and images were reviewed. Patient Has recent ABG, which has been reviewed. Will treat underlying causes and adjust management of respiratory failure as follows-      58 year old male with multiple medical problems who presented to [facility] [place] ED via EMS for AMS, hypoglycemia, and hypoxia placed on NIPPV. Per chart review patient with room air saturation in 50s which improved with HFNC, however, he remained tachypnic with accessory muscle use, therefore he was placed on NIPPV. Unclear if patient requires oxygen at baseline. Previous hospitalizations with oxygen use. CXR with bilateral opacifications R > L and BNP elevated > 4900.      Admitted to ICU, Intubated and mechanically ventilated, extubated to NC 10/13, sating well  Chest x-ray concerning for pneumonia vs ARDS     - Monitoring ABGs PRN  - Treated for COVID-19 pneumonia: Remdesivir completed. Dexamethasone 6 mg daily  - CAP coverage with Vanc/Zosyn completed   - Received HD 10/11, 10/12, 10/13, 10/15 and 10/16.   - Duoneb nebulizer, tiotropium and Fluticasone furoate / Vilanterol

## 2023-10-18 NOTE — PROGRESS NOTES
HD completed , blood returned and needles pulled. Post bleeding time < 5 minutes.  Net uf remove 3 liters. B/P 143/77, pulse 69 on sat  94%. Patient alert and stable

## 2023-10-18 NOTE — PROGRESS NOTES
"[facility] - Cardiac Medical ICU  Adult Nutrition  Progress Note    SUMMARY       Recommendations    Continue Minced & Moist (level 5), Diabetic diet - add Renal diet restrictions if necessary.  RD to monitor & follow-up.    Goals: Meet % EEN, EPN by RD f/u date  Nutrition Goal Status: goal met  Communication of RD Recs: discussed on rounds    Assessment and Plan    Nutrition Problem:  Inadequate energy intake     Related to (etiology):   Inability to consume sufficient energy      Signs and Symptoms (as evidenced by):   NPO     Interventions(treatment strategy):  Collaboration of nutrition care w/ other providers     Nutrition Diagnosis Status:   Resolved    Reason for Assessment    Reason For Assessment: RD follow-up  Diagnosis: other (see comments) (Resp. fx)  Relevant Medical History: HF, DM, ESRD on HD  Interdisciplinary Rounds: attended    General Information Comments: Extubated 10/12, diet advanced & TFs discontinued - pt tolerating diet w/ good appetite. Pt received HD overnight. Pt with no significant wt loss PTA (UBW: 160#). Pt appears nourished; NFPE not warranted.   Nutrition Discharge Planning: Pending clinical course    Nutrition/Diet History    Factors Affecting Nutritional Intake: None identified at this time    Anthropometrics    Temp: 97.6 °F (36.4 °C)  Height Method: Stated  Height: 5' 9" (175.3 cm)  Height (inches): 69 in  Weight Method: Estimated  Weight: 70.2 kg (154 lb 12.2 oz)  Weight (lb): 154.76 lb  Ideal Body Weight (IBW), Male: 160 lb  % Ideal Body Weight, Male (lb): 96.73 %  BMI (Calculated): 22.8  BMI Grade: 18.5-24.9 - normal    Lab/Procedures/Meds    Pertinent Labs Reviewed: reviewed  Pertinent Labs Comments: Creat 3, GFR 23.1, P 5.2, A1C 8.4  Pertinent Medications Reviewed: reviewed  Pertinent Medications Comments: -    Estimated/Assessed Needs    Weight Used For Calorie Calculations: 70.2 kg (154 lb 12.2 oz)    Energy Calorie Requirements (kcal): 1878 kcal/d  Energy Need Method: " Lanny Marroquin (1.25 PAL)    Protein Requirements: 84 g/d (1.2 g/kg)  Weight Used For Protein Calculations: 70.2 kg (154 lb 12.2 oz)    Estimated Fluid Requirement Method: other (see comments) (Per MD or 1 mL/kcal)  RDA Method (mL): 1878    CHO Requirement: 235g    Nutrition Prescription Ordered    Current Diet Order: Minced & Moist (level 5), 2000 kcal ADA  Current Nutrition Support Formula Ordered: Other (Comment) (Discontinued)    Evaluation of Received Nutrient/Fluid Intake    I/O: -5L since admit    Comments: LBM: 10/17    Tolerance: tolerating    Nutrition Risk    Level of Risk/Frequency of Follow-up:  (1x/week)     Monitor and Evaluation    Food and Nutrient Intake: enteral nutrition intake, food and beverage intake, energy intake  Food and Nutrient Adminstration: enteral and parenteral nutrition administration, diet order  Physical Activity and Function: nutrition-related ADLs and IADLs  Anthropometric Measurements: weight, weight change  Biochemical Data, Medical Tests and Procedures: glucose/endocrine profile, lipid profile, inflammatory profile, gastrointestinal profile, electrolyte and renal panel     Nutrition Follow-Up    RD Follow-up?: Yes

## 2023-10-18 NOTE — SUBJECTIVE & OBJECTIVE
Interval History:   ROBERTO.  Seen this morning on HD, tolerating well.  Eating breakfast in NAD.    Electrolytes non-emergent this morning.  On HFNC.    Review of patient's allergies indicates:  No Known Allergies  Current Facility-Administered Medications   Medication Frequency    0.9%  NaCl infusion Once    acetaminophen tablet 650 mg Q6H PRN    albuterol-ipratropium 2.5 mg-0.5 mg/3 mL nebulizer solution 3 mL Q4H PRN    carvediloL tablet 12.5 mg BID WM    dexAMETHasone injection 6 mg Daily    dextrose 10% bolus 125 mL 125 mL PRN    dextrose 10% bolus 250 mL 250 mL PRN    epoetin xavier-epbx injection 3,510 Units Every Mon, Wed, Fri    FLUoxetine capsule 40 mg Daily    fluticasone furoate-vilanteroL 100-25 mcg/dose diskus inhaler 1 puff Daily    glucagon (human recombinant) injection 1 mg PRN    glucose chewable tablet 16 g PRN    glucose chewable tablet 24 g PRN    hydrALAZINE injection 10 mg Q6H PRN    insulin aspart U-100 pen 0-10 Units QID (AC + HS) PRN    multivitamin tablet Daily    NIFEdipine 24 hr tablet 60 mg BID    sodium chloride 0.9% bolus 250 mL 250 mL PRN    tiotropium bromide 2.5 mcg/actuation inhaler 2 puff Daily       Objective:     Vital Signs (Most Recent):  Temp: 97.6 °F (36.4 °C) (10/18/23 0930)  Pulse: 65 (10/18/23 1000)  Resp: 15 (10/18/23 1000)  BP: (!) 144/87 (10/18/23 1000)  SpO2: 98 % (10/18/23 1000) Vital Signs (24h Range):  Temp:  [96.1 °F (35.6 °C)-98.1 °F (36.7 °C)] 97.6 °F (36.4 °C)  Pulse:  [57-72] 65  Resp:  [13-40] 15  SpO2:  [85 %-100 %] 98 %  BP: (118-183)/() 144/87     Weight: 70.2 kg (154 lb 12.2 oz) (10/14/23 0400)  Body mass index is 22.85 kg/m².  Body surface area is 1.85 meters squared.    I/O last 3 completed shifts:  In: 298.1 [IV Piggyback:298.1]  Out: 4500 [Other:4500]     Physical Exam  Vitals and nursing note reviewed.   Constitutional:       General: He is not in acute distress.     Appearance: He is well-developed. He is ill-appearing.      Interventions: Nasal  cannula in place.   HENT:      Head: Normocephalic and atraumatic.      Nose: No congestion or rhinorrhea.   Eyes:      General: No scleral icterus.        Right eye: No discharge.         Left eye: No discharge.      Extraocular Movements: Extraocular movements intact.      Conjunctiva/sclera: Conjunctivae normal.   Neck:      Vascular: JVD present.   Cardiovascular:      Rate and Rhythm: Normal rate and regular rhythm.      Heart sounds: No murmur heard.     No friction rub. No gallop.      Comments: UMA AVF  Pulmonary:      Effort: No respiratory distress.      Breath sounds: Rhonchi and rales present. No wheezing.   Abdominal:      General: There is no distension.      Palpations: Abdomen is soft.      Tenderness: There is no abdominal tenderness.   Musculoskeletal:         General: No swelling.      Cervical back: Normal range of motion.      Right lower leg: Edema present.      Left lower leg: Edema present.   Skin:     General: Skin is warm and dry.   Neurological:      Mental Status: He is alert. Mental status is at baseline.          Significant Labs:  CBC:   Recent Labs   Lab 10/18/23  0439   WBC 2.82*   RBC 2.66*   HGB 7.8*   HCT 23.3*   PLT 39*   MCV 88   MCH 29.3   MCHC 33.5     CMP:   Recent Labs   Lab 10/18/23  0439   *   CALCIUM 7.8*   ALBUMIN 2.2*   PROT 5.3*      K 4.5   CO2 25      BUN 55*   CREATININE 3.0*   ALKPHOS 119   ALT 26   AST 28   BILITOT 1.6*

## 2023-10-18 NOTE — SUBJECTIVE & OBJECTIVE
Interval History: see above    Review of Systems  Objective:     Vital Signs (Most Recent):  Temp: 96.8 °F (36 °C) (10/18/23 1100)  Pulse: 70 (10/18/23 1400)  Resp: 16 (10/18/23 1400)  BP: 139/75 (10/18/23 1400)  SpO2: (!) 94 % (10/18/23 1400) Vital Signs (24h Range):  Temp:  [96.1 °F (35.6 °C)-97.6 °F (36.4 °C)] 96.8 °F (36 °C)  Pulse:  [57-72] 70  Resp:  [13-26] 16  SpO2:  [82 %-100 %] 94 %  BP: (116-183)/() 139/75     Weight: 70.2 kg (154 lb 12.2 oz)  Body mass index is 22.85 kg/m².    Intake/Output Summary (Last 24 hours) at 10/18/2023 1509  Last data filed at 10/18/2023 1300  Gross per 24 hour   Intake 15.42 ml   Output 3500 ml   Net -3484.58 ml         Physical Exam        Significant Labs: All pertinent labs within the past 24 hours have been reviewed.  CBC:   Recent Labs   Lab 10/17/23  0417 10/18/23  0439   WBC 3.65* 2.82*   HGB 7.8* 7.8*   HCT 23.6* 23.3*   PLT 35* 39*     CMP:   Recent Labs   Lab 10/17/23  0417 10/18/23  0439    141   K 3.8 4.5    100   CO2 25 25    169*   BUN 36* 55*   CREATININE 2.2* 3.0*   CALCIUM 7.8* 7.8*   PROT 5.3* 5.3*   ALBUMIN 2.4* 2.2*   BILITOT 1.8* 1.6*   ALKPHOS 130 119   AST 35 28   ALT 31 26   ANIONGAP 15 16       Significant Imaging: I have reviewed all pertinent imaging results/findings within the past 24 hours.

## 2023-10-19 NOTE — PLAN OF CARE
MICU DAILY GOALS     Family/Goals of care/Code Status   Code Status: Full Code    24H Vital Sign Range  Temp:  [96.1 °F (35.6 °C)-98.3 °F (36.8 °C)]   Pulse:  []   Resp:  [13-36]   BP: (115-165)/(56-87)   SpO2:  [66 %-100 %]      Shift Events   No acute events throughout shift. Stepdown orders in place, awaiting bed placement.     AWAKE RASS: Goal - RASS Goal: 0-->alert and calm  Actual - RASS (Osorio Agitation-Sedation Scale): alert and calm    Restraint necessity: Not necessary   BREATHE SBT: Not intubated    Coordinate A & B, analgesics/sedatives Pain: managed   SAT: Not intubated   Delirium CAM-ICU: Overall CAM-ICU: Negative   Early(intubated/ Progressive (non-intubated) Mobility MOVE Screen (INTUBATED ONLY): Not intubated    Activity: Activity Management: Rolling - L1   Feeding/Nutrition Diet order: Diet/Nutrition Received: mechanical/dental soft, consistent carb/diabetic diet, Specialty Diet/Nutrition Received: renal diet   Thrombus DVT prophylaxis: VTE Required Core Measure: Pharmacological prophylaxis initiated/maintained   HOB Elevation Head of Bed (HOB) Positioning: HOB at 20-30 degrees   Ulcer Prophylaxis GI: no   Glucose control managed Glycemic Management: blood glucose monitored, supplemental insulin given   Skin Skin assessed during: Q Shift Change    [] No Altered Skin Integrity Present    []Prevention Measures Documented      [] Yes- Altered Skin Integrity Present or Discovered   [] LDA Added if Not in Epic (Describe Wound)   [] New Altered Skin Integrity was Present on Admit and Documented in LDA   [] Wound Image Taken    Wound Care Consulted? No    Attending Nurse:  Morelia Lagunas RN/Staff Member:  Juventino CARTER RN   Bowel Function no issues    Indwelling Catheter Necessity N/A, Anuric   De-escalation Antibiotics N/A       VS and assessment per flow sheet, patient progressing towards goals as tolerated, plan of care reviewed with Dr. Lewis, all concerns addressed, will continue to  monitor.

## 2023-10-19 NOTE — PLAN OF CARE
[facility] Cardiac Medical [facility]  Discharge Reassessment     Primary Care Provider: Eliecer Ohara III, MD     Expected Discharge Date: 10/20/2023     Pt is not medically ready for dc at this time.     Pt will return to his NH, [facility], once medically cleared. Ref sent to [facility].     Reassessment (most recent)         Discharge Reassessment - 10/16/23 1238                    Discharge Reassessment     Assessment Type Discharge Planning Reassessment      Did the patient's condition or plan change since previous assessment? Yes      Discharge Plan A Return to nursing home      Discharge Plan B Return to Nursing Home      DME Needed Upon Discharge  none      Transition of Care Barriers None      Why the patient remains in the hospital Requires continued medical care            Post-Acute Status     Post-Acute Authorization Placement      Post-Acute Placement Status Pending medical clearance/testing      Discharge Delays None known at this time                         Pt not yet medically ready for discharge back to NH. Pt now on 5L nasal canula. Covid post with pneumonia. SW will follow.    CAMILA Cunningham, JOSEFINA  [facility]  [id number]

## 2023-10-19 NOTE — ASSESSMENT & PLAN NOTE
Recent Labs   Lab 10/19/23  0343   WBC 3.33*   RBC 2.64*   HGB 7.5*   HCT 23.8*   PLT 50*   MCV 90   MCH 28.4   MCHC 31.5*     -- CBC daily and trend   -- Transfuse for hgb < 7   -- Receives EPO with iHD

## 2023-10-19 NOTE — PROGRESS NOTES
[facility] - Cardiac Medical ICU  Critical Care Medicine  Progress Note    Patient Name: Cosme Lewis  MRN: [id number]  Admission Date: 10/11/2023  Hospital Length of Stay: 8 days  Code Status: Full Code  Attending Provider: Ricky Vasquez MD  Primary Care Provider: Eliecer Ohara III, MD   Principal Problem: Chronic hypoxemic respiratory failure    Subjective:     HPI:  Mr. Lewis is a 58 year old male with PMH notable for COPD, HFrEF (45% and DD), ESRD on dialysis, HTN, DM presents via EMS from nursing home for altered mental status and hypoxia.  Per EMS, patient was found to have glucose of 38, he received D50 with improvement in his glucose.  He also had SpO2 58% which improved with 15 L nasal cannula.     ED work up revealed VBG 7.27/61 and he was subsequently placed on bipap for work of breathing. He was persistently hypoglycemic with BG 59 --> 34 despite IV dextrose, therefore he was started on a continuous dextrose infusion. Other lab work up revealed WBC 3, stable, H//H 9.5/29, Na 143, CO2 20, Cr/BUN 3.8/38, elevated AST/ALT/alk phos, and troponin 0.174 with no ischemic changes on EKG. He was given IV rocephin and doxycyline. Chest XR with bilateral opacifications R > L.    Critical care consulted for acute hypoxemic respiratory failure and NIPPV      Hospital/ICU Course:  In the MICU, the patient arrived on bipap but was found to still be hypoxic which initially improved after adjusting his bipap settings. He continued to be altered and tried occasionally became combative requiring precedex which was affective when maxed out at 1.4 mcg/kg/hour. During this time he was also found to have worsening hypoglycemia which required continuous D10 administration at a max rate of 200 cc/hour. Eventually his oxygen status started declining and the decision was made to intubate the patient. It is believed that worsening of his oxygen status was in part due the large volume of D10 he received to maintain his blood sugars. For  this reason, it was decided that D20 at a slower rate would be more appropriate, and for this a central line was placed. Additionally, he received a round of hemodialysis. He is receiving remdesivir for COVID and vacomycin/zosyn/doxycycline for community acquired pneumonia coverage/concerning chest x-ray. The patient's sugars started rising on the D20 and eventually he started becoming hyperglycemic  to the 220s which led to the D20 being stopped for the time being and since that time the sugars have been relatively normoglycemic. His abdomen was found to be slightly more distended but KUB was negative for bowel obstruction, likely suggesting ascites as the source.    On 10/12 after passing SAT and SBT, the decision was made to extubate the patient which was done successfully. All sedatives were turned off at this time as well. He was placed on high flow at the time and has been sating well. He continued to receive antibiotics as well as antivirals for CAP coverage/COVID respectively which was able to help improve his likely pneumonia vs ARDS.     Between 10/14 - 10/17 he went back and forth between requiring high flow and bipap, however starting on 10/17 he was able to tolerate nasal canula.         Interval History/Significant Events: No significant events or new complaints.    Review of Systems   Unable to perform ROS: Acuity of condition   All other systems reviewed and are negative.    Objective:     Vital Signs (Most Recent):  Temp: 96.4 °F (35.8 °C) (10/19/23 1100)  Pulse: 67 (10/19/23 1531)  Resp: (!) 21 (10/19/23 1531)  BP: 139/74 (10/19/23 1651)  SpO2: 95 % (10/19/23 1531) Vital Signs (24h Range):  Temp:  [96.2 °F (35.7 °C)-97.4 °F (36.3 °C)] 96.4 °F (35.8 °C)  Pulse:  [] 67  Resp:  [14-36] 21  SpO2:  [84 %-100 %] 95 %  BP: (107-158)/() 139/74   Weight: 70.2 kg (154 lb 12.2 oz)  Body mass index is 22.85 kg/m².      Intake/Output Summary (Last 24 hours) at 10/19/2023 0196  Last data filed at  10/19/2023 0600  Gross per 24 hour   Intake 940 ml   Output --   Net 940 ml          Physical Exam  Vitals and nursing note reviewed.   Constitutional:       General: He is not in acute distress.     Appearance: He is ill-appearing.      Comments: Increased responsiveness and able to communicate/formulate coherent words   HENT:      Head: Normocephalic and atraumatic.      Right Ear: External ear normal.      Left Ear: External ear normal.      Nose: Nose normal.   Eyes:      General:         Right eye: No discharge.         Left eye: No discharge.      Pupils: Pupils are equal, round, and reactive to light.   Cardiovascular:      Rate and Rhythm: Normal rate and regular rhythm.   Pulmonary:      Effort: Pulmonary effort is normal.      Comments: Improved breathing status, minimal crackles auscultated in the lung fields   Abdominal:      General: There is distension.      Tenderness: There is no abdominal tenderness.      Comments: Some degree of distension still present, however improved from previous days   Musculoskeletal:         General: No swelling or deformity.   Skin:     General: Skin is warm.      Coloration: Skin is not jaundiced.   Psychiatric:         Mood and Affect: Mood normal.         Behavior: Behavior normal.            Vents:  Vent Mode: Spont (10/12/23 1503)  Set Rate: 20 BPM (10/12/23 1141)  Vt Set: 380 mL (10/12/23 1141)  Pressure Support: 5 cmH20 (10/12/23 1503)  PEEP/CPAP: 5 cmH20 (10/12/23 1503)  Oxygen Concentration (%): 50 (10/19/23 1500)  Peak Airway Pressure: 11 cmH20 (10/12/23 1503)  Plateau Pressure: 20 cmH20 (10/12/23 1503)  Total Ve: 12.8 L/m (10/12/23 1503)  Negative Inspiratory Force (cm H2O): 0 (10/12/23 1503)  F/VT Ratio<105 (RSBI): (!) 97.83 (10/12/23 1503)  Lines/Drains/Airways       Peripheral Intravenous Line  Duration                  Hemodialysis AV Fistula Left upper arm -- days         Peripheral IV - Single Lumen 10/16/23 1709 20 G;1 3/4 in Anterior;Right Upper Arm 3  days         Peripheral IV - Single Lumen 10/16/23 1710 20 G;1 3/4 in Anterior;Right Upper Arm 3 days                  Significant Labs:    CBC/Anemia Profile:  Recent Labs   Lab 10/18/23  0439 10/19/23  0343   WBC 2.82* 3.33*   HGB 7.8* 7.5*   HCT 23.3* 23.8*   PLT 39* 50*   MCV 88 90   RDW 17.2* 17.5*        Chemistries:  Recent Labs   Lab 10/18/23  0439 10/19/23  0343    138   K 4.5 4.0    103   CO2 25 21*   BUN 55* 44*   CREATININE 3.0* 2.7*   CALCIUM 7.8* 7.7*   ALBUMIN 2.2* 2.2*   PROT 5.3* 5.3*   BILITOT 1.6* 1.3*   ALKPHOS 119 131   ALT 26 25   AST 28 28   MG 2.1 2.1   PHOS 5.2* 4.0       All pertinent labs within the past 24 hours have been reviewed.    Significant Imaging:  I have reviewed all pertinent imaging results/findings within the past 24 hours.      ABG  Recent Labs   Lab 10/15/23  1310   PH 7.352   PO2 40   PCO2 46.3*   HCO3 25.7   BE 0     Assessment/Plan:     Pulmonary  * Chronic hypoxemic respiratory failure  Patient with Hypercapnic and Hypoxic Respiratory failure which is Chronic.  he is not on home oxygen. Supplemental oxygen was provided and noted-  Signs/symptoms of respiratory failure include- tachypnea, increased work of breathing and use of accessory muscles. Contributing diagnoses includes - CHF, COPD and fluid volume excess Labs and images were reviewed. Patient Has recent ABG, which has been reviewed. Will treat underlying causes and adjust management of respiratory failure as follows-     58 year old male with multiple medical problems who presented to [facility] ED via EMS for AMS, hypoglycemia, and hypoxia placed on NIPPV. Per chart review patient with room air saturation in 50s which improved with HFNC, however, he remained tachypnic with accessory muscle use, therefore he was placed on NIPPV. Unclear if patient requires oxygen at baseline. Previous hospitalizations with oxygen use. CXR with bilateral opacifications R > L and BNP elevated > 4900.     Intubated and  mechanically ventilated, extubated to NC 10/13, sating well  Chest x-ray concerning for pneumonia vs ARDS    - Monitoring ABGs PRN  - Remdesivir completed. Dexamethasone 6 mg daily  - CAP coverage with Vanc/Zosyn completed   - Received HD 10/11, 10/12, 10/13, 10/15, 10/16 and 10/18   - Duoneb nebulizer, tiotropium and Fluticasone furoate / Vilanterol     Acute respiratory failure with hypoxia and hypercapnia  Patient with Hypoxic Respiratory failure which is Acute.  he is not on home oxygen. Supplemental oxygen was provided and noted.  Signs/symptoms of respiratory failure include- respiratory distress. Contributing diagnoses includes - ARDS Labs and images were reviewed. Patient Has recent ABG, which has been reviewed. Will treat underlying causes and adjust management of respiratory failure as follows- continuing antibiotics and antivirals, adjustments of mechanical ventilation, HD and fluid regulation.     Extubated to 5L NC morning of 10/13. In no acute distress  VBG 7.2552  However mental status improving    -continue to monitor on NC  -ABG as warranted  -Currently on nasal canula    COPD (chronic obstructive pulmonary disease)  --Duo nebs Q4wake  --Currently on nasal canula  --Fluticasone furoate / Vilanterol and Salmeterol     Cardiac/Vascular  HLD (hyperlipidemia)  -- LFT's have improved, atorvastatin 40 mg daily      Chronic diastolic heart failure  ECHO 09/23:       Left Ventricle: The left ventricle is normal in size. Increased wall thickness. There is concentric hypertrophy. Mild global hypokinesis present. There is mildly reduced systolic function with a visually estimated ejection fraction of 40 - 45%. There is indeterminate diastolic function.    Right Ventricle: Normal right ventricular cavity size. Right ventricle wall motion  is normal. Systolic function is borderline low.    Left Atrium: Left atrium is severely dilated.    Tricuspid Valve: There is mild regurgitation.    Pericardium: There  is a trivial effusion. Echodensity seen adjacent to the visceral pericardium of uncertain significance. This may be pericardial fat although other etiologies not excluded. Correlate clinically.    PASP is at least 46 mmHg.    BNP > 4900    --Volume removal per iHD. Appreciate nephrology assistance   --Blood pressure started becoming elevated on 10/17. Home nifedipine and lisinopril restarted as pressures have been within normal limits. Hydralazine pushes prn should pressures rise.     Renal/  ESRD (end stage renal disease)  Outpatient HD [facility]: [facility]   HD tx days: MWF   HD tx time: 4hrs   HD access: LUE AVF   HD modality: iHD   Residual urine: Anuric   Estimated Creatinine Clearance: 33.8 mL/min (A) (based on SCr of 2.3 mg/dL (H)).    Toelrating iHD well    -- Nephrology consulted for iHD management. Appreciate their assistance   -- CMP daily and trend   -- Avoid nephrotoxins   -- Renally dose all medications to appropriate GFR / CrCl   -- Hgb > 7 and MAP > 65 goals     Hematology  History of pulmonary embolism  Diagnosed 10/2022  Repeat CT 3/2023 with resolution    -hold home eliquis for mental status change  -heparin stopped as patient's platelet level fell to 35,000. Has since recovered to 50,00    Oncology  Anemia in ESRD (end-stage renal disease)  Recent Labs   Lab 10/19/23  0343   WBC 3.33*   RBC 2.64*   HGB 7.5*   HCT 23.8*   PLT 50*   MCV 90   MCH 28.4   MCHC 31.5*     -- CBC daily and trend   -- Transfuse for hgb < 7   -- Receives EPO with iHD    Endocrine  Hyperglycemia due to type 2 diabetes mellitus  -Currently on sliding scale insulin and insulin aspart with meals        Critical Care Daily Checklist:    A: Awake: RASS Goal/Actual Goal: RASS Goal: 0-->alert and calm  Actual:     B: Spontaneous Breathing Trial Performed? Spon. Breathing Trial Initiated?: Not initiated (10/12/23 0742)   C: SAT & SBT Coordinated?  Passed                      D: Delirium: CAM-ICU Overall CAM-ICU: Negative    E: Early Mobility Performed? No   F: Feeding Goal: Goals: Meet % EEN, EPN by RD f/u date  Status: Nutrition Goal Status: goal met   Current Diet Order   Procedures    Diet diabetic Minced & Moist (IDDSI Level 5); 2000 Calorie     Order Specific Question:   Additional Diet Options:     Answer:   Minced & Moist (IDDSI Level 5)     Order Specific Question:   Total calories:     Answer:   2000 Calorie      AS: Analgesia/Sedation None   T: Thromboembolic Prophylaxis stopped   H: HOB > 300 Yes   U: Stress Ulcer Prophylaxis (if needed) None   G: Glucose Control Sliding scale insulin   B: Bowel Function Stool Occurrence: 1   I: Indwelling Catheter (Lines & Dutta) Necessity PIVs   D: De-escalation of Antimicrobials/Pharmacotherapies None    Plan for the day/ETD Stepdown, Respiratory and blood pressure management    Code Status:  Family/Goals of Care: Full Code          Critical care was time spent personally by me on the following activities: development of treatment plan with patient or surrogate and bedside caregivers, discussions with consultants, evaluation of patient's response to treatment, examination of patient, ordering and performing treatments and interventions, ordering and review of laboratory studies, ordering and review of radiographic studies, pulse oximetry, re-evaluation of patient's condition. This critical care time did not overlap with that of any other provider or involve time for any procedures.     Robbie Fields MD  Critical Care Medicine  [facility] - Cardiac Medical ICU

## 2023-10-19 NOTE — SUBJECTIVE & OBJECTIVE
Interval History/Significant Events: No significant events or new complaints.    Review of Systems   Unable to perform ROS: Acuity of condition   All other systems reviewed and are negative.    Objective:     Vital Signs (Most Recent):  Temp: 96.4 °F (35.8 °C) (10/19/23 1100)  Pulse: 67 (10/19/23 1531)  Resp: (!) 21 (10/19/23 1531)  BP: 139/74 (10/19/23 1651)  SpO2: 95 % (10/19/23 1531) Vital Signs (24h Range):  Temp:  [96.2 °F (35.7 °C)-97.4 °F (36.3 °C)] 96.4 °F (35.8 °C)  Pulse:  [] 67  Resp:  [14-36] 21  SpO2:  [84 %-100 %] 95 %  BP: (107-158)/() 139/74   Weight: 70.2 kg (154 lb 12.2 oz)  Body mass index is 22.85 kg/m².      Intake/Output Summary (Last 24 hours) at 10/19/2023 1729  Last data filed at 10/19/2023 0600  Gross per 24 hour   Intake 940 ml   Output --   Net 940 ml          Physical Exam  Vitals and nursing note reviewed.   Constitutional:       General: He is not in acute distress.     Appearance: He is ill-appearing.      Comments: Increased responsiveness and able to communicate/formulate coherent words   HENT:      Head: Normocephalic and atraumatic.      Right Ear: External ear normal.      Left Ear: External ear normal.      Nose: Nose normal.   Eyes:      General:         Right eye: No discharge.         Left eye: No discharge.      Pupils: Pupils are equal, round, and reactive to light.   Cardiovascular:      Rate and Rhythm: Normal rate and regular rhythm.   Pulmonary:      Effort: Pulmonary effort is normal.      Comments: Improved breathing status, minimal crackles auscultated in the lung fields   Abdominal:      General: There is distension.      Tenderness: There is no abdominal tenderness.      Comments: Some degree of distension still present, however improved from previous days   Musculoskeletal:         General: No swelling or deformity.   Skin:     General: Skin is warm.      Coloration: Skin is not jaundiced.   Psychiatric:         Mood and Affect: Mood normal.          Behavior: Behavior normal.            Vents:  Vent Mode: Spont (10/12/23 1503)  Set Rate: 20 BPM (10/12/23 1141)  Vt Set: 380 mL (10/12/23 1141)  Pressure Support: 5 cmH20 (10/12/23 1503)  PEEP/CPAP: 5 cmH20 (10/12/23 1503)  Oxygen Concentration (%): 50 (10/19/23 1500)  Peak Airway Pressure: 11 cmH20 (10/12/23 1503)  Plateau Pressure: 20 cmH20 (10/12/23 1503)  Total Ve: 12.8 L/m (10/12/23 1503)  Negative Inspiratory Force (cm H2O): 0 (10/12/23 1503)  F/VT Ratio<105 (RSBI): (!) 97.83 (10/12/23 1503)  Lines/Drains/Airways       Peripheral Intravenous Line  Duration                  Hemodialysis AV Fistula Left upper arm -- days         Peripheral IV - Single Lumen 10/16/23 1709 20 G;1 3/4 in Anterior;Right Upper Arm 3 days         Peripheral IV - Single Lumen 10/16/23 1710 20 G;1 3/4 in Anterior;Right Upper Arm 3 days                  Significant Labs:    CBC/Anemia Profile:  Recent Labs   Lab 10/18/23  0439 10/19/23  0343   WBC 2.82* 3.33*   HGB 7.8* 7.5*   HCT 23.3* 23.8*   PLT 39* 50*   MCV 88 90   RDW 17.2* 17.5*        Chemistries:  Recent Labs   Lab 10/18/23  0439 10/19/23  0343    138   K 4.5 4.0    103   CO2 25 21*   BUN 55* 44*   CREATININE 3.0* 2.7*   CALCIUM 7.8* 7.7*   ALBUMIN 2.2* 2.2*   PROT 5.3* 5.3*   BILITOT 1.6* 1.3*   ALKPHOS 119 131   ALT 26 25   AST 28 28   MG 2.1 2.1   PHOS 5.2* 4.0       All pertinent labs within the past 24 hours have been reviewed.    Significant Imaging:  I have reviewed all pertinent imaging results/findings within the past 24 hours.

## 2023-10-19 NOTE — ASSESSMENT & PLAN NOTE
Diagnosed 10/2022  Repeat CT 3/2023 with resolution    -hold home eliquis for mental status change  -heparin stopped as patient's platelet level fell to 35,000. Has since recovered to 50,00

## 2023-10-19 NOTE — ASSESSMENT & PLAN NOTE
Patient with Hypercapnic and Hypoxic Respiratory failure which is Chronic.  he is not on home oxygen. Supplemental oxygen was provided and noted-  Signs/symptoms of respiratory failure include- tachypnea, increased work of breathing and use of accessory muscles. Contributing diagnoses includes - CHF, COPD and fluid volume excess Labs and images were reviewed. Patient Has recent ABG, which has been reviewed. Will treat underlying causes and adjust management of respiratory failure as follows-     58 year old male with multiple medical problems who presented to [facility] ED via EMS for AMS, hypoglycemia, and hypoxia placed on NIPPV. Per chart review patient with room air saturation in 50s which improved with HFNC, however, he remained tachypnic with accessory muscle use, therefore he was placed on NIPPV. Unclear if patient requires oxygen at baseline. Previous hospitalizations with oxygen use. CXR with bilateral opacifications R > L and BNP elevated > 4900.     Intubated and mechanically ventilated, extubated to NC 10/13, sating well  Chest x-ray concerning for pneumonia vs ARDS    - Monitoring ABGs PRN  - Remdesivir completed. Dexamethasone 6 mg daily  - CAP coverage with Vanc/Zosyn completed   - Received HD 10/11, 10/12, 10/13, 10/15, 10/16 and 10/18   - Duoneb nebulizer, tiotropium and Fluticasone furoate / Vilanterol

## 2023-10-20 PROBLEM — J96.22 ACUTE ON CHRONIC RESPIRATORY FAILURE WITH HYPOXIA AND HYPERCAPNIA: Status: ACTIVE | Noted: 2022-10-12

## 2023-10-20 PROBLEM — J96.21 ACUTE ON CHRONIC RESPIRATORY FAILURE WITH HYPOXIA AND HYPERCAPNIA: Status: ACTIVE | Noted: 2022-10-12

## 2023-10-20 PROBLEM — R06.00 DYSPNEA: Status: ACTIVE | Noted: 2023-01-01

## 2023-10-20 PROBLEM — Z51.5 PALLIATIVE CARE ENCOUNTER: Status: ACTIVE | Noted: 2023-01-01

## 2023-10-20 PROBLEM — Z71.89 ADVANCE CARE PLANNING: Status: ACTIVE | Noted: 2023-01-01

## 2023-10-20 NOTE — PROGRESS NOTES
[facility] - Cardiac Medical ICU  Palliative Medicine  Consult. Note    Patient Name: Cosme Lewis  MRN: [id number]  Admission Date: 10/11/2023  Hospital Length of Stay: 9 days  Code Status: Full Code   Attending Provider: Ricky Vasquez MD  Consulting Provider: NANCI Boyd  Primary Care Physician: Eliecer Ohara III, MD  Principal Problem:Chronic hypoxemic respiratory failure    Patient information was obtained from patient and primary team.      Assessment/Plan:     Palliative Care  Palliative care encounter  Impression: Pt is a 57 y/o male with COPD, ESRD on HD. Pt admitted wit COVID pneumonia, pulmonary edema. Pt is on 3 L o2 per NC. Pt is alert, oriented to person, place, situation.     Reason for consult: GOC/ACP    Introduced pal care to pt who is open to continued visits outpt clinic f/u. Met with pt who is aware of his kidney issues and that he has lung issues. Discussed pt will have continued decline with his COPD. Pt seems to lack insight into gravity of situation. Pt's goals are to continue current medical management and d/c back to [facility] when ready.     MPOA: none in chart. Pt reports he wants his mother, Kassandra Lewis,  to be called to make medical decisions if he is unable to. Pt reports not .     Symptom management:     Pt denies pain.     Pt endorses dyspena r/t to COPD/COVID    Recs:   Consider oxycodone oral liquid 2.5 mg q 4 hrs prn dyspnea.   Pt reports he is open to trying medication to help alleviate SOB symptoms.     Debility:   Pt reports Wheelchair bound:     Recs:   Would have PT/Ot evaluate and treat.     Plan:   Pt to follow up outpt.         I will follow-up with patient. Please contact us if you have any additional questions.    Subjective:     Chief Complaint:   Chief Complaint   Patient presents with    Hypoglycemia     Pt from [facility], initial CBG 38, given 25g D50 and now CBG 99.     Shortness of Breath     Initial SpO2 58% with snoring respirations.  Arrives on non rebreather 15 L, SpO2 95%. Initially unresponsive to sternal rub, GCS 11. NPA in place.        HPI:   Pt is a 58 year old male with PMH notable for COPD, HFrEF (45% and DD), ESRD on dialysis, HTN, DM presented via EMS from nursing home at [facility] for altered mental status and hypoxia.  Per EMS, patient was found to have glucose of 38, he received D50 with improvement in his glucose.  He also had SpO2 58% which improved with 15 L nasal cannula.      Per chart review, ED work up revealed VBG 7.27/61 and he was subsequently placed on bipap for work of breathing. He was persistently hypoglycemic with BG 59 --> 34 despite IV dextrose, therefore he was started on a continuous dextrose infusion. Other lab work up revealed WBC 3, stable, H//H 9.5/29, Na 143, CO2 20, Cr/BUN 3.8/38, elevated AST/ALT/alk phos, and troponin 0.174 with no ischemic changes on EKG. He was given IV rocephin and doxycyline. Chest XR with bilateral opacifications R > L.     Critical care consulted for acute hypoxemic respiratory failure and NIPPV         Hospital Course:  No notes on file    Interval History: Pt to d/c back to [facility].     Past Medical History:   Diagnosis Date    CHF (congestive heart failure)     Chronic kidney disease-mineral and bone disorder 10/12/2022    Chronic respiratory failure     COPD (chronic obstructive pulmonary disease)     Diabetes mellitus type 1     ESRD on dialysis     Hypertension     Hypervolemia 02/22/2023    Hypoglycemia 7/18/2023    Hyponatremia 03/04/2023    Hyponatremia 3/4/2023    Other insomnia     Recurrent major depression     SOB (shortness of breath) 10/12/2022    Patient with history COPD treated with Ellipta the albuterol, fluticasone-salmeterol tachypneic in the ED saturating 94% and 6 L on nasal cannula, patient does not know how many  he uses it is in nursing home.    -duo nebs Q 4  Given that patient is a poor historian, and in the setting of left lower extremity edema and history of  coagulopathy PE cannot be ruled out.  Will workup for possible infec    Unspecified lack of coordination        Past Surgical History:   Procedure Laterality Date    AV FISTULA PLACEMENT      FISTULOGRAM Left 8/20/2023    Procedure: Fistulogram;  Surgeon: Duong Aceves MD;  Location: [facility] OR [address] [place] [address];  Service: Vascular;  Laterality: Left;  1.6 MIN  55.57 mGy  10.4137 Gycm2  24 ml dye  4 ml transradial flush    PHLEBOGRAPHY  8/20/2023    Procedure: VENOGRAM, CENTRAL;  Surgeon: Duong Aceves MD;  Location: [address];  Service: Vascular;;       Review of patient's allergies indicates:  No Known Allergies    Medications:  Continuous Infusions:  Scheduled Meds:   sodium chloride 0.9%   Intravenous Once    apixaban  2.5 mg Oral BID    atorvastatin  40 mg Oral Daily    carvediloL  12.5 mg Oral BID WM    dexAMETHasone  6 mg Oral Daily    epoetin xavier-epbx  50 Units/kg Intravenous Every Mon, Wed, Fri    FLUoxetine  40 mg Oral Daily    fluticasone furoate-vilanteroL  1 puff Inhalation Daily    insulin detemir U-100  2 Units Subcutaneous BID    [START ON 10/21/2023] multivitamin  1 tablet Oral Daily    NIFEdipine  60 mg Oral BID    tiotropium bromide  2 puff Inhalation Daily     PRN Meds:acetaminophen, albuterol-ipratropium, dextrose 10%, dextrose 10%, glucagon (human recombinant), glucose, glucose, hydrALAZINE, insulin aspart U-100, sodium chloride 0.9%    Family History       Problem Relation (Age of Onset)    Diabetes Mother          Tobacco Use    Smoking status: Never    Smokeless tobacco: Never   Substance and Sexual Activity    Alcohol use: Not Currently    Drug use: Not Currently    Sexual activity: Not Currently       Review of Systems   Constitutional:  Positive for activity change and fatigue.   Respiratory:  Positive for shortness of breath.    Gastrointestinal:  Negative for constipation, diarrhea, nausea and vomiting.   Musculoskeletal:  Positive for gait problem.   Neurological:  Positive  for weakness.     Objective:     Vital Signs (Most Recent):  Temp: 96.3 °F (35.7 °C) (10/20/23 0800)  Pulse: 72 (10/20/23 1115)  Resp: (!) 36 (10/20/23 1115)  BP: 134/77 (10/20/23 1100)  SpO2: 97 % (10/20/23 1115) Vital Signs (24h Range):  Temp:  [96.3 °F (35.7 °C)-98.3 °F (36.8 °C)] 96.3 °F (35.7 °C)  Pulse:  [62-93] 72  Resp:  [19-43] 36  SpO2:  [75 %-100 %] 97 %  BP: (124-174)/() 134/77     Weight: 70.2 kg (154 lb 12.2 oz)  Body mass index is 22.85 kg/m².       Physical Exam  Constitutional:       General: He is not in acute distress.     Interventions: Nasal cannula in place.   HENT:      Head: Normocephalic and atraumatic.   Eyes:      General: Lids are normal.      Conjunctiva/sclera: Conjunctivae normal.   Cardiovascular:      Rate and Rhythm: Normal rate and regular rhythm.   Pulmonary:      Effort: Pulmonary effort is normal. No respiratory distress.      Comments: On 3 L NC.   Musculoskeletal:      Comments: Weakness noted.   HD shunt to left arm.    Skin:     General: Skin is warm and dry.   Neurological:      Mental Status: He is alert and oriented to person, place, and time.   Psychiatric:         Attention and Perception: Attention normal.         Behavior: Behavior is cooperative.            Review of Symptoms      Symptom Assessment (ESAS 0-10 Scale)  Pain:  0  Dyspnea:  4  Anxiety:  0  Nausea:  0  Depression:  0  Anorexia:  0  Fatigue:  0  Insomnia:  0  Restlessness:  0  Agitation:  0       Constipation:  No constipation    Performance Status:  40    Living Arrangements:  Lives in nursing home    Psychosocial/Cultural:   See Palliative Psychosocial Note: No  Pt [facility] at [facility] for past 5 years. Pt's mother visits him often. Pt reports he also has a brother.   **Primary  to Follow**  Palliative Care  Consult: Yes        Advance Care Planning  Advance Directives:   Living Will: No    Do Not Resuscitate Status: No    Medical Power of : No      Decision Making:   Patient answered questions  Goals of Care: The patient endorses that what is most important right now is to focus on continuing medical management.          Significant Labs: All pertinent labs within the past 24 hours have been reviewed.  CBC:   Recent Labs   Lab 10/20/23  0210   WBC 4.25   HGB 7.2*   HCT 22.3*   MCV 90   PLT 67*     BMP:  Recent Labs   Lab 10/20/23  0210   *      K 3.8      CO2 20*   BUN 60*   CREATININE 3.2*   CALCIUM 7.4*   MG 2.0     LFT:  Lab Results   Component Value Date    AST 29 10/20/2023    ALKPHOS 138 (H) 10/20/2023    BILITOT 1.0 10/20/2023     Albumin:   Albumin   Date Value Ref Range Status   10/20/2023 2.2 (L) 3.5 - 5.2 g/dL Final     Protein:   Total Protein   Date Value Ref Range Status   10/20/2023 5.4 (L) 6.0 - 8.4 g/dL Final     Lactic acid:   Lab Results   Component Value Date    LACTATE 0.7 08/19/2023    LACTATE 1.0 07/18/2023       Significant Imaging: I have reviewed all pertinent imaging results/findings within the past 24 hours.      > 50% of  75 min visit spent in chart review, face to face discussion of goals of care,  symptom assessment, coordination of care and emotional support    Orin Dorsey, CNS  Palliative Medicine  [facility] - Cardiac Medical ICU

## 2023-10-20 NOTE — HPI
Pt is a 58 year old male with PMH notable for COPD, HFrEF (45% and DD), ESRD on dialysis, HTN, DM presented via EMS from nursing home at [facility] for altered mental status and hypoxia.  Per EMS, patient was found to have glucose of 38, he received D50 with improvement in his glucose.  He also had SpO2 58% which improved with 15 L nasal cannula.      Per chart review, ED work up revealed VBG 7.27/61 and he was subsequently placed on bipap for work of breathing. He was persistently hypoglycemic with BG 59 --> 34 despite IV dextrose, therefore he was started on a continuous dextrose infusion. Other lab work up revealed WBC 3, stable, H//H 9.5/29, Na 143, CO2 20, Cr/BUN 3.8/38, elevated AST/ALT/alk phos, and troponin 0.174 with no ischemic changes on EKG. He was given IV rocephin and doxycyline. Chest XR with bilateral opacifications R > L.     Critical care consulted for acute hypoxemic respiratory failure and NIPPV

## 2023-10-20 NOTE — ASSESSMENT & PLAN NOTE
ESRD on iHD MWF  [facility]  4 hours  EDW:  71 kg  UMA AVF    Plan/Recommendations:  -HD today   -UF 3L as tolerated  -continue strict I/O's  -RFP daily  -renal diet when advanced, 1L fluid restrictions  -Phos WNL, no need for binders at this time

## 2023-10-20 NOTE — ASSESSMENT & PLAN NOTE
Impression: Pt is a 57 y/o male with COPD, ESRD on HD. Pt admitted wit COVID pneumonia, pulmonary edema. Pt is on 3 L o2 per NC. Pt is alert, oriented to person, place, situation.     Reason for consult: GOC/ACP    Introduced pal care to pt who is open to continued visits outpt clinic f/u. Met with pt who is aware of his kidney issues and that he has lung issues. Discussed pt will have continued decline with his COPD. Pt seems to lack insight into gravity of situation. Pt's goals are to continue current medical management and d/c back to [facility] when ready.     MPOA: none in chart. Pt reports he wants his mother, Kassandra Lewis,  to be called to make medical decisions if he is unable to. Pt reports not .     Symptom management:     Pt denies pain.     Pt endorses dyspena r/t to COPD/COVID    Recs:   Consider oxycodone oral liquid 2.5 mg q 4 hrs prn dyspnea.   Pt reports he is open to trying medication to help alleviate SOB symptoms.     Debility:   Pt reports Wheelchair bound:     Recs:   Would have PT/Ot evaluate and treat.     Plan:   Pt to follow up outpt.

## 2023-10-20 NOTE — SUBJECTIVE & OBJECTIVE
Interval History/Significant Events: NAEON.  HIT antibody (+).  O2 reqs decreasing, but did have one episode of needing BiPAP for about an hour yesterday afternoon.  Has done well since and is now down to 2LNC.  Got HD today.  BP higher today, will start resuming home meds.  Remains medically ready to step down.  Palliative consulted for recs for sx management at [facility], appreciate assistance.    Objective:     Vital Signs (Most Recent):  Temp: 96.3 °F (35.7 °C) (10/20/23 0800)  Pulse: 68 (10/20/23 1330)  Resp: (!) 23 (10/20/23 1330)  BP: (!) 147/88 (10/20/23 1330)  SpO2: 100 % (10/20/23 1330) Vital Signs (24h Range):  Temp:  [96.3 °F (35.7 °C)-98.3 °F (36.8 °C)] 96.3 °F (35.7 °C)  Pulse:  [62-93] 68  Resp:  [19-43] 23  SpO2:  [75 %-100 %] 100 %  BP: (124-174)/() 147/88   Weight: 70.2 kg (154 lb 12.2 oz)  Body mass index is 22.85 kg/m².      Intake/Output Summary (Last 24 hours) at 10/20/2023 1342  Last data filed at 10/20/2023 1330  Gross per 24 hour   Intake --   Output 3500 ml   Net -3500 ml          Physical Exam  Vitals and nursing note reviewed.   Constitutional:       General: He is not in acute distress.     Appearance: He is ill-appearing.      Comments: Increased responsiveness and able to communicate/formulate coherent words   HENT:      Head: Normocephalic and atraumatic.      Right Ear: External ear normal.      Left Ear: External ear normal.      Nose: Nose normal.   Eyes:      General:         Right eye: No discharge.         Left eye: No discharge.      Pupils: Pupils are equal, round, and reactive to light.   Cardiovascular:      Rate and Rhythm: Normal rate and regular rhythm.   Pulmonary:      Effort: Pulmonary effort is normal.      Comments: Improved breathing status, minimal crackles auscultated in the lung fields   Abdominal:      General: There is distension.      Tenderness: There is no abdominal tenderness.      Comments: Some degree of distension still present, however  "improved from previous days   Musculoskeletal:         General: No swelling or deformity.   Skin:     General: Skin is warm.      Coloration: Skin is not jaundiced.   Psychiatric:         Mood and Affect: Mood normal.         Behavior: Behavior normal.            Vents:  Vent Mode: Spont (10/12/23 1503)  Set Rate: 20 BPM (10/12/23 1141)  Vt Set: 380 mL (10/12/23 1141)  Pressure Support: 5 cmH20 (10/12/23 1503)  PEEP/CPAP: 5 cmH20 (10/12/23 1503)  Oxygen Concentration (%): 5 (10/20/23 0400)  Peak Airway Pressure: 11 cmH20 (10/12/23 1503)  Plateau Pressure: 20 cmH20 (10/12/23 1503)  Total Ve: 12.8 L/m (10/12/23 1503)  Negative Inspiratory Force (cm H2O): 0 (10/12/23 1503)  F/VT Ratio<105 (RSBI): (!) 97.83 (10/12/23 1503)  Lines/Drains/Airways       Peripheral Intravenous Line  Duration                  Hemodialysis AV Fistula Left upper arm -- days         Peripheral IV - Single Lumen 10/16/23 1709 20 G;1 3/4 in Anterior;Right Upper Arm 3 days         Peripheral IV - Single Lumen 10/16/23 1710 20 G;1 3/4 in Anterior;Right Upper Arm 3 days                  Significant Labs:    CBC/Anemia Profile:  Recent Labs   Lab 10/19/23  0343 10/20/23  0210   WBC 3.33* 4.25   HGB 7.5* 7.2*   HCT 23.8* 22.3*   PLT 50* 67*   MCV 90 90   RDW 17.5* 17.8*        Chemistries:  Recent Labs   Lab 10/19/23  0343 10/20/23  0210    136   K 4.0 3.8    100   CO2 21* 20*   BUN 44* 60*   CREATININE 2.7* 3.2*   CALCIUM 7.7* 7.4*   ALBUMIN 2.2* 2.2*   PROT 5.3* 5.4*   BILITOT 1.3* 1.0   ALKPHOS 131 138*   ALT 25 24   AST 28 29   MG 2.1 2.0   PHOS 4.0 3.9       Bilirubin:   Recent Labs   Lab 10/16/23  0435 10/17/23  0417 10/18/23  0439 10/19/23  0343 10/20/23  0210   BILITOT 1.8* 1.8* 1.6* 1.3* 1.0     Coagulation: No results for input(s): "PT", "INR", "APTT" in the last 48 hours.  All pertinent labs within the past 24 hours have been reviewed.    Significant Imaging:  I have reviewed all pertinent imaging results/findings within the " past 24 hours.

## 2023-10-20 NOTE — SUBJECTIVE & OBJECTIVE
Interval History:   NAEON.  Due for HD today.  On HFNC.    Review of patient's allergies indicates:  No Known Allergies  Current Facility-Administered Medications   Medication Frequency    0.9%  NaCl infusion Once    acetaminophen tablet 650 mg Q6H PRN    albuterol-ipratropium 2.5 mg-0.5 mg/3 mL nebulizer solution 3 mL Q4H PRN    atorvastatin tablet 40 mg Daily    carvediloL tablet 12.5 mg BID WM    dexAMETHasone tablet 6 mg Daily    dextrose 10% bolus 125 mL 125 mL PRN    dextrose 10% bolus 250 mL 250 mL PRN    epoetin xavier-epbx injection 3,510 Units Every Mon, Wed, Fri    FLUoxetine capsule 40 mg Daily    fluticasone furoate-vilanteroL 100-25 mcg/dose diskus inhaler 1 puff Daily    glucagon (human recombinant) injection 1 mg PRN    glucose chewable tablet 16 g PRN    glucose chewable tablet 24 g PRN    hydrALAZINE injection 10 mg Q6H PRN    insulin aspart U-100 pen 0-10 Units QID (AC + HS) PRN    insulin detemir U-100 (Levemir) pen 2 Units BID    [START ON 10/21/2023] multivitamin tablet Daily    NIFEdipine 24 hr tablet 60 mg BID    sodium chloride 0.9% bolus 250 mL 250 mL PRN    tiotropium bromide 2.5 mcg/actuation inhaler 2 puff Daily       Objective:     Vital Signs (Most Recent):  Temp: 96.3 °F (35.7 °C) (10/20/23 0800)  Pulse: 69 (10/20/23 0848)  Resp: 19 (10/20/23 0848)  BP: (!) 173/94 (10/20/23 0845)  SpO2: 100 % (10/20/23 0800) Vital Signs (24h Range):  Temp:  [96.3 °F (35.7 °C)-98.3 °F (36.8 °C)] 96.3 °F (35.7 °C)  Pulse:  [62-93] 69  Resp:  [19-43] 19  SpO2:  [75 %-100 %] 100 %  BP: (124-174)/() 173/94     Weight: 70.2 kg (154 lb 12.2 oz) (10/18/23 1100)  Body mass index is 22.85 kg/m².  Body surface area is 1.85 meters squared.    I/O last 3 completed shifts:  In: 940 [P.O.:940]  Out: -      Physical Exam  Vitals and nursing note reviewed.   Constitutional:       General: He is not in acute distress.     Appearance: He is well-developed. He is ill-appearing.      Interventions: Nasal cannula in  place.   HENT:      Head: Normocephalic and atraumatic.      Nose: No congestion or rhinorrhea.   Eyes:      General: No scleral icterus.        Right eye: No discharge.         Left eye: No discharge.      Extraocular Movements: Extraocular movements intact.      Conjunctiva/sclera: Conjunctivae normal.   Neck:      Vascular: JVD present.   Cardiovascular:      Rate and Rhythm: Normal rate and regular rhythm.      Heart sounds: No murmur heard.     No friction rub. No gallop.      Comments: UMA AVF  Pulmonary:      Effort: No respiratory distress.      Breath sounds: Rhonchi and rales present. No wheezing.   Abdominal:      General: There is no distension.      Palpations: Abdomen is soft.      Tenderness: There is no abdominal tenderness.   Musculoskeletal:         General: No swelling.      Cervical back: Normal range of motion.      Right lower leg: Edema present.      Left lower leg: Edema present.   Skin:     General: Skin is warm and dry.   Neurological:      Mental Status: He is alert. Mental status is at baseline.          Significant Labs:  CBC:   Recent Labs   Lab 10/20/23  0210   WBC 4.25   RBC 2.47*   HGB 7.2*   HCT 22.3*   PLT 67*   MCV 90   MCH 29.1   MCHC 32.3     CMP:   Recent Labs   Lab 10/20/23  0210   *   CALCIUM 7.4*   ALBUMIN 2.2*   PROT 5.4*      K 3.8   CO2 20*      BUN 60*   CREATININE 3.2*   ALKPHOS 138*   ALT 24   AST 29   BILITOT 1.0

## 2023-10-20 NOTE — PROGRESS NOTES
[facility] - [facility]  Critical Care Medicine  Progress Note    Patient Name: Cosme Lewis  MRN: [id number]  Admission Date: 10/11/2023  Hospital Length of Stay: 9 days  Code Status: Full Code  Attending Provider: Ricky Vasquez MD  Primary Care Provider: Eliecer Ohara III, MD   Principal Problem: Acute on chronic respiratory failure with hypoxia and hypercapnia    Subjective:     HPI:  Mr. Lewis is a 58 year old male with PMH notable for COPD, HFrEF (45% and DD), ESRD on dialysis, HTN, DM presents via EMS from nursing home for altered mental status and hypoxia.  Per EMS, patient was found to have glucose of 38, he received D50 with improvement in his glucose.  He also had SpO2 58% which improved with 15 L nasal cannula.     ED work up revealed VBG 7.27/61 and he was subsequently placed on bipap for work of breathing. He was persistently hypoglycemic with BG 59 --> 34 despite IV dextrose, therefore he was started on a continuous dextrose infusion. Other lab work up revealed WBC 3, stable, H//H 9.5/29, Na 143, CO2 20, Cr/BUN 3.8/38, elevated AST/ALT/alk phos, and troponin 0.174 with no ischemic changes on EKG. He was given IV rocephin and doxycyline. Chest XR with bilateral opacifications R > L.    Critical care consulted for acute hypoxemic respiratory failure and NIPPV      Hospital/ICU Course:  In the MICU, the patient arrived on bipap but was found to still be hypoxic which initially improved after adjusting his bipap settings. He continued to be altered and tried occasionally became combative requiring precedex which was affective when maxed out at 1.4 mcg/kg/hour. During this time he was also found to have worsening hypoglycemia which required continuous D10 administration at a max rate of 200 cc/hour. Eventually his oxygen status started declining and the decision was made to intubate the patient. It is believed that worsening of his oxygen status was in part due the large volume of D10 he received to  maintain his blood sugars. For this reason, it was decided that D20 at a slower rate would be more appropriate, and for this a central line was placed. Additionally, he received a round of hemodialysis. He is receiving remdesivir for COVID and vacomycin/zosyn/doxycycline for community acquired pneumonia coverage/concerning chest x-ray. The patient's sugars started rising on the D20 and eventually he started becoming hyperglycemic  to the 220s which led to the D20 being stopped for the time being and since that time the sugars have been relatively normoglycemic. His abdomen was found to be slightly more distended but KUB was negative for bowel obstruction, likely suggesting ascites as the source.    On 10/12 after passing SAT and SBT, the decision was made to extubate the patient which was done successfully. All sedatives were turned off at this time as well. He was placed on high flow at the time and has been sating well. He continued to receive antibiotics as well as antivirals for CAP coverage/COVID respectively which was able to help improve his likely pneumonia vs ARDS.     Between 10/14 - 10/17 he went back and forth between requiring high flow and bipap, however starting on 10/17 he was able to tolerate nasal canula.         Interval History/Significant Events: NAEON.  HIT antibody (+).  O2 reqs decreasing, but did have one episode of needing BiPAP for about an hour yesterday afternoon.  Has done well since and is now down to 2LNC.  Got HD today.  BP higher today, will start resuming home meds.  Remains medically ready to step down.  Palliative consulted for recs for sx management at [facility], appreciate assistance.    Objective:     Vital Signs (Most Recent):  Temp: 96.3 °F (35.7 °C) (10/20/23 0800)  Pulse: 68 (10/20/23 1330)  Resp: (!) 23 (10/20/23 1330)  BP: (!) 147/88 (10/20/23 1330)  SpO2: 100 % (10/20/23 1330) Vital Signs (24h Range):  Temp:  [96.3 °F (35.7 °C)-98.3 °F (36.8 °C)] 96.3 °F  (35.7 °C)  Pulse:  [62-93] 68  Resp:  [19-43] 23  SpO2:  [75 %-100 %] 100 %  BP: (124-174)/() 147/88   Weight: 70.2 kg (154 lb 12.2 oz)  Body mass index is 22.85 kg/m².      Intake/Output Summary (Last 24 hours) at 10/20/2023 1342  Last data filed at 10/20/2023 1330  Gross per 24 hour   Intake --   Output 3500 ml   Net -3500 ml          Physical Exam  Vitals and nursing note reviewed.   Constitutional:       General: He is not in acute distress.     Appearance: He is ill-appearing.      Comments: Increased responsiveness and able to communicate/formulate coherent words   HENT:      Head: Normocephalic and atraumatic.      Right Ear: External ear normal.      Left Ear: External ear normal.      Nose: Nose normal.   Eyes:      General:         Right eye: No discharge.         Left eye: No discharge.      Pupils: Pupils are equal, round, and reactive to light.   Cardiovascular:      Rate and Rhythm: Normal rate and regular rhythm.   Pulmonary:      Effort: Pulmonary effort is normal.      Comments: Improved breathing status, minimal crackles auscultated in the lung fields   Abdominal:      General: There is distension.      Tenderness: There is no abdominal tenderness.      Comments: Some degree of distension still present, however improved from previous days   Musculoskeletal:         General: No swelling or deformity.   Skin:     General: Skin is warm.      Coloration: Skin is not jaundiced.   Psychiatric:         Mood and Affect: Mood normal.         Behavior: Behavior normal.            Vents:  Vent Mode: Spont (10/12/23 1503)  Set Rate: 20 BPM (10/12/23 1141)  Vt Set: 380 mL (10/12/23 1141)  Pressure Support: 5 cmH20 (10/12/23 1503)  PEEP/CPAP: 5 cmH20 (10/12/23 1503)  Oxygen Concentration (%): 5 (10/20/23 0400)  Peak Airway Pressure: 11 cmH20 (10/12/23 1503)  Plateau Pressure: 20 cmH20 (10/12/23 1503)  Total Ve: 12.8 L/m (10/12/23 1503)  Negative Inspiratory Force (cm H2O): 0 (10/12/23 1503)  F/VT Ratio<105  "(RSBI): (!) 97.83 (10/12/23 1503)  Lines/Drains/Airways       Peripheral Intravenous Line  Duration                  Hemodialysis AV Fistula Left upper arm -- days         Peripheral IV - Single Lumen 10/16/23 1709 20 G;1 3/4 in Anterior;Right Upper Arm 3 days         Peripheral IV - Single Lumen 10/16/23 1710 20 G;1 3/4 in Anterior;Right Upper Arm 3 days                  Significant Labs:    CBC/Anemia Profile:  Recent Labs   Lab 10/19/23  0343 10/20/23  0210   WBC 3.33* 4.25   HGB 7.5* 7.2*   HCT 23.8* 22.3*   PLT 50* 67*   MCV 90 90   RDW 17.5* 17.8*        Chemistries:  Recent Labs   Lab 10/19/23  0343 10/20/23  0210    136   K 4.0 3.8    100   CO2 21* 20*   BUN 44* 60*   CREATININE 2.7* 3.2*   CALCIUM 7.7* 7.4*   ALBUMIN 2.2* 2.2*   PROT 5.3* 5.4*   BILITOT 1.3* 1.0   ALKPHOS 131 138*   ALT 25 24   AST 28 29   MG 2.1 2.0   PHOS 4.0 3.9       Bilirubin:   Recent Labs   Lab 10/16/23  0435 10/17/23  0417 10/18/23  0439 10/19/23  0343 10/20/23  0210   BILITOT 1.8* 1.8* 1.6* 1.3* 1.0     Coagulation: No results for input(s): "PT", "INR", "APTT" in the last 48 hours.  All pertinent labs within the past 24 hours have been reviewed.    Significant Imaging:  I have reviewed all pertinent imaging results/findings within the past 24 hours.      ABG  Recent Labs   Lab 10/15/23  1310   PH 7.352   PO2 40   PCO2 46.3*   HCO3 25.7   BE 0     Assessment/Plan:     Pulmonary  * Acute on chronic respiratory failure with hypoxia and hypercapnia  Patient with Hypercapnic and Hypoxic Respiratory failure which is Chronic.  he is not on home oxygen. Supplemental oxygen was provided and noted-  Signs/symptoms of respiratory failure include- tachypnea, increased work of breathing and use of accessory muscles. Contributing diagnoses includes - CHF, COPD and fluid volume excess Labs and images were reviewed. Patient Has recent ABG, which has been reviewed. Will treat underlying causes and adjust management of respiratory " failure as follows-     58 year old male with multiple medical problems who [place] [facility] [place] for AMS, hypoglycemia, and hypoxia placed on NIPPV. SpO2 in 50s on arrival which improved with HFNC, however, he remained tachypnic with accessory muscle use, therefore he was placed on NIPPV. CXR with bilateral opacifications R > L and BNP elevated > 4900.     Intubated and mechanically ventilated, extubated to NC 10/13, sating well  Chest x-ray concerning for pneumonia vs ARDS    · Weaning off O2; currently on RA trial and doing well at 93% with minimal subjective dyspnea at rest  · Remdesivir completed. Dexamethasone 6 mg daily  · CAP coverage with Vanc/Zosyn completed   · HD for volume clearance per Nephro schedule  · Duoneb nebulizer, tiotropium and Fluticasone furoate / Vilanterol       Acute respiratory failure with hypoxia and hypercapnia  Patient with Hypoxic Respiratory failure which is Acute.  he is not on home oxygen. Supplemental oxygen was provided and noted.  Signs/symptoms of respiratory failure include- respiratory distress. Contributing diagnoses includes - ARDS Labs and images were reviewed. Patient Has recent ABG, which has been reviewed. Will treat underlying causes and adjust management of respiratory failure as follows- continuing antibiotics and antivirals, adjustments of mechanical ventilation, HD and fluid regulation.     Extubated to 5L NC morning of 10/13. In no acute distress  VBG 7.2552  However mental status improving    -continue to monitor on NC  -ABG as warranted  -Currently on nasal canula    COPD (chronic obstructive pulmonary disease)  --Duo nebs Q4wake  --Currently on nasal canula  --Fluticasone furoate / Vilanterol and Salmeterol     Cardiac/Vascular  HLD (hyperlipidemia)  -- LFT's have improved, atorvastatin 40 mg daily      Chronic diastolic heart failure  ECHO 09/23:       Left Ventricle: The left ventricle is normal in size. Increased wall thickness. There is concentric  hypertrophy. Mild global hypokinesis present. There is mildly reduced systolic function with a visually estimated ejection fraction of 40 - 45%. There is indeterminate diastolic function.    Right Ventricle: Normal right ventricular cavity size. Right ventricle wall motion  is normal. Systolic function is borderline low.    Left Atrium: Left atrium is severely dilated.    Tricuspid Valve: There is mild regurgitation.    Pericardium: There is a trivial effusion. Echodensity seen adjacent to the visceral pericardium of uncertain significance. This may be pericardial fat although other etiologies not excluded. Correlate clinically.    PASP is at least 46 mmHg.    BNP > 4900    --Volume removal per iHD. Appreciate nephrology assistance   --Blood pressure started becoming elevated on 10/17. Home nifedipine and lisinopril restarted as pressures have been within normal limits. Hydralazine pushes prn should pressures rise.     Renal/  ESRD (end stage renal disease)  Outpatient HD [facility]: [facility]   HD tx days: MWF   HD tx time: 4hrs   HD access: LUE AVF   HD modality: iHD   Residual urine: Anuric   Estimated Creatinine Clearance: 33.8 mL/min (A) (based on SCr of 2.3 mg/dL (H)).    Toelrating iHD well    -- Nephrology consulted for iHD management. Appreciate their assistance   -- CMP daily and trend   -- Avoid nephrotoxins   -- Renally dose all medications to appropriate GFR / CrCl   -- Hgb > 7 and MAP > 65 goals     Hematology  History of pulmonary embolism  Diagnosed 10/2022  Repeat CT 3/2023 with resolution    -hold home eliquis for mental status change  -heparin stopped as patient's platelet level fell to 35,000. Has since recovered to 50,00    Oncology  Anemia in ESRD (end-stage renal disease)  Recent Labs   Lab 10/20/23  0210   WBC 4.25   RBC 2.47*   HGB 7.2*   HCT 22.3*   PLT 67*   MCV 90   MCH 29.1   MCHC 32.3     · CBC daily and trend   · Transfuse for hgb < 7   · Receives EPO with  iHD    Endocrine  Hyperglycemia due to type 2 diabetes mellitus  -Currently on sliding scale insulin and insulin aspart with meals     Palliative Care  Advance care planning  Palliative care consulted for outpatient sx mgmt recs; appreciate assist       Critical Care Daily Checklist:    A: Awake: RASS Goal/Actual Goal: RASS Goal: 0-->alert and calm  Actual:     B: Spontaneous Breathing Trial Performed? Spon. Breathing Trial Initiated?: Not initiated (10/12/23 0742)   C: SAT & SBT Coordinated?  N/A                      D: Delirium: CAM-ICU Overall CAM-ICU: Negative   E: Early Mobility Performed? Yes   F: Feeding Goal: Goals: Meet % EEN, EPN by RD f/u date  Status: Nutrition Goal Status: goal met   Current Diet Order   Procedures    Diet diabetic Minced & Moist (IDDSI Level 5); 2000 Calorie     Order Specific Question:   Additional Diet Options:     Answer:   Minced & Moist (IDDSI Level 5)     Order Specific Question:   Total calories:     Answer:   2000 Calorie      AS: Analgesia/Sedation Tylenol   T: Thromboembolic Prophylaxis Eliq   H: HOB > 300 Yes   U: Stress Ulcer Prophylaxis (if needed) N/A   G: Glucose Control MDSSI   B: Bowel Function Stool Occurrence: 1   I: Indwelling Catheter (Lines & Dutta) Necessity Needs PIVs   D: De-escalation of Antimicrobials/Pharmacotherapies Weaning O2    Plan for the day/ETD Wean O2 and step down if bed available    Code Status:  Family/Goals of Care: Full Code  Palliative care consulted       Critical secondary to Patient has a condition that poses threat to life and bodily function: Severe Respiratory Distress      Critical care was time spent personally by me on the following activities: development of treatment plan with patient or surrogate and bedside caregivers, discussions with consultants, evaluation of patient's response to treatment, examination of patient, ordering and performing treatments and interventions, ordering and review of laboratory studies, ordering  and review of radiographic studies, pulse oximetry, re-evaluation of patient's condition. This critical care time did not overlap with that of any other provider or involve time for any procedures.     Houston Rico MD  Critical Care Medicine  [facility] - Cardiac Medical ICU

## 2023-10-20 NOTE — PROGRESS NOTES
Bedside Hd initiated, Cannulated upper left amr avf with 15 gauge needles . / B/p 134/77 , pulse 69 , o2 sat 97% on . High flow nasal cannula, patient awake and alert.

## 2023-10-20 NOTE — PROGRESS NOTES
Bedside Hd completed , net uf 3 liters removed.post B/P 147/88, pulse 70, resp 22 o2 sat on high flow nasal cannula 95% . Patient stable and alert

## 2023-10-20 NOTE — PROGRESS NOTES
[facility] - Card[facility] Medical [facility]  Nephrology  Progress Note    Patient Name: Cosme Lewis  MRN: [id number]  Admission Date: 10/11/2023  Hospital Length of Stay: 9 days  Attending Provider: Ricky Vasquez MD   Primary Care Physician: Eliecer Ohara III, MD  Principal Problem:Chronic hypoxemic respiratory failure    Subjective:     HPI: Cosme Lewis is a 59 yo male with PMHx of ESRD on iHD MWF who presents from his NH with AMS, hypoxia, and hypoglycemia.  He was placed on BiPAP and transferred to ICU for higher level of care.  CXR with evidence of pulmonary edema, BNP elevated > 3800.  Labs on chemistry without emergent electrolyte abnormalities consistent with ESRD status.  CBC without leukocytosis.  He was found to be COVID + and started on Broad spectrum Abx and steroids. He required D5W gtt for persistent hypoglycemia.  Nephrology has been consulted for ESRD management while IP.      Interval History:   NAEON.  Due for HD today.  On HFNC.    Review of patient's allergies indicates:  No Known Allergies  Current Facility-Administered Medications   Medication Frequency    0.9%  NaCl infusion Once    acetaminophen tablet 650 mg Q6H PRN    albuterol-ipratropium 2.5 mg-0.5 mg/3 mL nebulizer solution 3 mL Q4H PRN    atorvastatin tablet 40 mg Daily    carvediloL tablet 12.5 mg BID WM    dexAMETHasone tablet 6 mg Daily    dextrose 10% bolus 125 mL 125 mL PRN    dextrose 10% bolus 250 mL 250 mL PRN    epoetin xavier-epbx injection 3,510 Units Every Mon, Wed, Fri    FLUoxetine capsule 40 mg Daily    fluticasone furoate-vilanteroL 100-25 mcg/dose diskus inhaler 1 puff Daily    glucagon (human recombinant) injection 1 mg PRN    glucose chewable tablet 16 g PRN    glucose chewable tablet 24 g PRN    hydrALAZINE injection 10 mg Q6H PRN    insulin aspart U-100 pen 0-10 Units QID (AC + HS) PRN    insulin detemir U-100 (Levemir) pen 2 Units BID    [START ON 10/21/2023] multivitamin tablet Daily    NIFEdipine 24 hr tablet  60 mg BID    sodium chloride 0.9% bolus 250 mL 250 mL PRN    tiotropium bromide 2.5 mcg/actuation inhaler 2 puff Daily       Objective:     Vital Signs (Most Recent):  Temp: 96.3 °F (35.7 °C) (10/20/23 0800)  Pulse: 69 (10/20/23 0848)  Resp: 19 (10/20/23 0848)  BP: (!) 173/94 (10/20/23 0845)  SpO2: 100 % (10/20/23 0800) Vital Signs (24h Range):  Temp:  [96.3 °F (35.7 °C)-98.3 °F (36.8 °C)] 96.3 °F (35.7 °C)  Pulse:  [62-93] 69  Resp:  [19-43] 19  SpO2:  [75 %-100 %] 100 %  BP: (124-174)/() 173/94     Weight: 70.2 kg (154 lb 12.2 oz) (10/18/23 1100)  Body mass index is 22.85 kg/m².  Body surface area is 1.85 meters squared.    I/O last 3 completed shifts:  In: 940 [P.O.:940]  Out: -      Physical Exam  Vitals and nursing note reviewed.   Constitutional:       General: He is not in acute distress.     Appearance: He is well-developed. He is ill-appearing.      Interventions: Nasal cannula in place.   HENT:      Head: Normocephalic and atraumatic.      Nose: No congestion or rhinorrhea.   Eyes:      General: No scleral icterus.        Right eye: No discharge.         Left eye: No discharge.      Extraocular Movements: Extraocular movements intact.      Conjunctiva/sclera: Conjunctivae normal.   Neck:      Vascular: JVD present.   Cardiovascular:      Rate and Rhythm: Normal rate and regular rhythm.      Heart sounds: No murmur heard.     No friction rub. No gallop.      Comments: UMA AVF  Pulmonary:      Effort: No respiratory distress.      Breath sounds: Rhonchi and rales present. No wheezing.   Abdominal:      General: There is no distension.      Palpations: Abdomen is soft.      Tenderness: There is no abdominal tenderness.   Musculoskeletal:         General: No swelling.      Cervical back: Normal range of motion.      Right lower leg: Edema present.      Left lower leg: Edema present.   Skin:     General: Skin is warm and dry.   Neurological:      Mental Status: He is alert. Mental status is at baseline.           Significant Labs:  CBC:   Recent Labs   Lab 10/20/23  0210   WBC 4.25   RBC 2.47*   HGB 7.2*   HCT 22.3*   PLT 67*   MCV 90   MCH 29.1   MCHC 32.3     CMP:   Recent Labs   Lab 10/20/23  0210   *   CALCIUM 7.4*   ALBUMIN 2.2*   PROT 5.4*      K 3.8   CO2 20*      BUN 60*   CREATININE 3.2*   ALKPHOS 138*   ALT 24   AST 29   BILITOT 1.0          Assessment/Plan:     Renal/  ESRD (end stage renal disease)  ESRD on iHD [facility]  4 hours  EDW:  71 kg  UMA AVF    Plan/Recommendations:  -HD today   -UF 3L as tolerated  -continue strict I/O's  -RFP daily  -renal diet when advanced, 1L fluid restrictions  -Phos WNL, no need for binders at this time    Oncology  Anemia in ESRD (end-stage renal disease)  Below goal for ESRD  -EPO with HD        Juventino Nguyen, NP  Nephrology  [facility] - Cardiac Medical ICU

## 2023-10-20 NOTE — ASSESSMENT & PLAN NOTE
Patient with Hypercapnic and Hypoxic Respiratory failure which is Chronic.  he is not on home oxygen. Supplemental oxygen was provided and noted-  Signs/symptoms of respiratory failure include- tachypnea, increased work of breathing and use of accessory muscles. Contributing diagnoses includes - CHF, COPD and fluid volume excess Labs and images were reviewed. Patient Has recent ABG, which has been reviewed. Will treat underlying causes and adjust management of respiratory failure as follows-     58 year old male with multiple medical problems who presented to [facility] ED via EMS for AMS, hypoglycemia, and hypoxia placed on NIPPV. SpO2 in 50s on arrival which improved with HFNC, however, he remained tachypnic with accessory muscle use, therefore he was placed on NIPPV. CXR with bilateral opacifications R > L and BNP elevated > 4900.     Intubated and mechanically ventilated, extubated to NC 10/13, sating well  Chest x-ray concerning for pneumonia vs ARDS    · Weaning off O2; currently on RA trial and doing well at 93% with minimal subjective dyspnea at rest  · Remdesivir completed. Dexamethasone 6 mg daily  · CAP coverage with Vanc/Zosyn completed   · HD for volume clearance per Nephro schedule  · Duoneb nebulizer, tiotropium and Fluticasone furoate / Vilanterol

## 2023-10-20 NOTE — SUBJECTIVE & OBJECTIVE
Interval History: Pt to d/c back to [facility].     Past Medical History:   Diagnosis Date    CHF (congestive heart failure)     Chronic kidney disease-mineral and bone disorder 10/12/2022    Chronic respiratory failure     COPD (chronic obstructive pulmonary disease)     Diabetes mellitus type 1     ESRD on dialysis     Hypertension     Hypervolemia 02/22/2023    Hypoglycemia 7/18/2023    Hyponatremia 03/04/2023    Hyponatremia 3/4/2023    Other insomnia     Recurrent major depression     SOB (shortness of breath) 10/12/2022    Patient with history COPD treated with Ellipta the albuterol, fluticasone-salmeterol tachypneic in the ED saturating 94% and 6 L on nasal cannula, patient does not know how many  he uses it is in nursing home.    -duo nebs Q 4  Given that patient is a poor historian, and in the setting of left lower extremity edema and history of coagulopathy PE cannot be ruled out.  Will workup for possible infec    Unspecified lack of coordination        Past Surgical History:   Procedure Laterality Date    AV FISTULA PLACEMENT      FISTULOGRAM Left 8/20/2023    Procedure: Fistulogram;  Surgeon: Duong Aceves MD;  Location: [facility] OR [address] [place] [address];  Service: Vascular;  Laterality: Left;  1.6 MIN  55.57 mGy  10.4137 Gycm2  24 ml dye  4 ml transradial flush    PHLEBOGRAPHY  8/20/2023    Procedure: VENOGRAM, CENTRAL;  Surgeon: Duong Aceves MD;  Location: [facility] OR [address] [place] [address];  Service: Vascular;;       Review of patient's allergies indicates:  No Known Allergies    Medications:  Continuous Infusions:  Scheduled Meds:   sodium chloride 0.9%   Intravenous Once    apixaban  2.5 mg Oral BID    atorvastatin  40 mg Oral Daily    carvediloL  12.5 mg Oral BID WM    dexAMETHasone  6 mg Oral Daily    epoetin xavier-epbx  50 Units/kg Intravenous Every Mon, Wed, Fri    FLUoxetine  40 mg Oral Daily    fluticasone furoate-vilanteroL  1 puff Inhalation Daily    insulin detemir U-100  2 Units Subcutaneous BID    [START ON  10/21/2023] multivitamin  1 tablet Oral Daily    NIFEdipine  60 mg Oral BID    tiotropium bromide  2 puff Inhalation Daily     PRN Meds:acetaminophen, albuterol-ipratropium, dextrose 10%, dextrose 10%, glucagon (human recombinant), glucose, glucose, hydrALAZINE, insulin aspart U-100, sodium chloride 0.9%    Family History       Problem Relation (Age of Onset)    Diabetes Mother          Tobacco Use    Smoking status: Never    Smokeless tobacco: Never   Substance and Sexual Activity    Alcohol use: Not Currently    Drug use: Not Currently    Sexual activity: Not Currently       Review of Systems   Constitutional:  Positive for activity change and fatigue.   Respiratory:  Positive for shortness of breath.    Gastrointestinal:  Negative for constipation, diarrhea, nausea and vomiting.   Musculoskeletal:  Positive for gait problem.   Neurological:  Positive for weakness.     Objective:     Vital Signs (Most Recent):  Temp: 96.3 °F (35.7 °C) (10/20/23 0800)  Pulse: 72 (10/20/23 1115)  Resp: (!) 36 (10/20/23 1115)  BP: 134/77 (10/20/23 1100)  SpO2: 97 % (10/20/23 1115) Vital Signs (24h Range):  Temp:  [96.3 °F (35.7 °C)-98.3 °F (36.8 °C)] 96.3 °F (35.7 °C)  Pulse:  [62-93] 72  Resp:  [19-43] 36  SpO2:  [75 %-100 %] 97 %  BP: (124-174)/() 134/77     Weight: 70.2 kg (154 lb 12.2 oz)  Body mass index is 22.85 kg/m².       Physical Exam  Constitutional:       General: He is not in acute distress.     Interventions: Nasal cannula in place.   HENT:      Head: Normocephalic and atraumatic.   Eyes:      General: Lids are normal.      Conjunctiva/sclera: Conjunctivae normal.   Cardiovascular:      Rate and Rhythm: Normal rate and regular rhythm.   Pulmonary:      Effort: Pulmonary effort is normal. No respiratory distress.      Comments: On 3 L NC.   Musculoskeletal:      Comments: Weakness noted.   HD shunt to left arm.    Skin:     General: Skin is warm and dry.   Neurological:      Mental Status: He is alert and oriented  to person, place, and time.   Psychiatric:         Attention and Perception: Attention normal.         Behavior: Behavior is cooperative.            Review of Symptoms      Symptom Assessment (ESAS 0-10 Scale)  Pain:  0  Dyspnea:  4  Anxiety:  0  Nausea:  0  Depression:  0  Anorexia:  0  Fatigue:  0  Insomnia:  0  Restlessness:  0  Agitation:  0       Constipation:  No constipation    Performance Status:  40    Living Arrangements:  Lives in nursing home    Psychosocial/Cultural:   See Palliative Psychosocial Note: No  Pt lives at [facility] for past 5 years. Pt's mother visits him often. Pt reports he also has a brother.   **Primary  to Follow**  Palliative Care  Consult: Yes        Advance Care Planning   Advance Directives:   Living Will: No    Do Not Resuscitate Status: No    Medical Power of : No      Decision Making:  Patient answered questions  Goals of Care: The patient endorses that what is most important right now is to focus on continuing medical management.          Significant Labs: All pertinent labs within the past 24 hours have been reviewed.  CBC:   Recent Labs   Lab 10/20/23  0210   WBC 4.25   HGB 7.2*   HCT 22.3*   MCV 90   PLT 67*     BMP:  Recent Labs   Lab 10/20/23  0210   *      K 3.8      CO2 20*   BUN 60*   CREATININE 3.2*   CALCIUM 7.4*   MG 2.0     LFT:  Lab Results   Component Value Date    AST 29 10/20/2023    ALKPHOS 138 (H) 10/20/2023    BILITOT 1.0 10/20/2023     Albumin:   Albumin   Date Value Ref Range Status   10/20/2023 2.2 (L) 3.5 - 5.2 g/dL Final     Protein:   Total Protein   Date Value Ref Range Status   10/20/2023 5.4 (L) 6.0 - 8.4 g/dL Final     Lactic acid:   Lab Results   Component Value Date    LACTATE 0.7 08/19/2023    LACTATE 1.0 07/18/2023       Significant Imaging: I have reviewed all pertinent imaging results/findings within the past 24 hours.

## 2023-10-20 NOTE — PLAN OF CARE
Problem: Diabetes Comorbidity  Goal: Blood Glucose Level Within Targeted Range  Outcome: Ongoing, Progressing     Problem: Impaired Wound Healing  Goal: Optimal Wound Healing  Outcome: Ongoing, Progressing   ..  MICU DAILY GOALS     Family/Goals of care/Code Status   Code Status: Full Code    24H Vital Sign Range  Temp:  [96.2 °F (35.7 °C)-98.3 °F (36.8 °C)]   Pulse:  []   Resp:  [16-36]   BP: (107-174)/()   SpO2:  [75 %-100 %]      Shift Events   No acute events throughout shift Labs and vitals monitored. Patient placed on bipap but refused it after an hour and kept taking it off. Patient taken off bipap and placed back on nc @5L. Patient given PRN insulin.    AWAKE RASS: Goal - RASS Goal: 0-->alert and calm  Actual - RASS (Osorio Agitation-Sedation Scale): alert and calm    Restraint necessity: Not necessary   BREATHE SBT: Not intubated    Coordinate A & B, analgesics/sedatives Pain: managed   SAT: Not intubated   Delirium CAM-ICU: Overall CAM-ICU: Negative   Early(intubated/ Progressive (non-intubated) Mobility MOVE Screen (INTUBATED ONLY): Not intubated    Activity: Activity Management: Rolling - L1   Feeding/Nutrition Diet order: Diet/Nutrition Received: mechanical/dental soft, Specialty Diet/Nutrition Received: renal diet   Thrombus DVT prophylaxis: VTE Required Core Measure: Pharmacological prophylaxis initiated/maintained   HOB Elevation Head of Bed (HOB) Positioning: HOB at 45 degrees   Ulcer Prophylaxis GI: no   Glucose control uncontrolled Glycemic Management: blood glucose monitored   Skin Skin assessed during: Daily Assessment    Sacrum intact? No  Heels intact? Yes  Surgical wound? No    [] No Altered Skin Integrity Present    []Prevention Measures Documented    [] Altered Skin Integrity Present or Discovered   [] LDA present /added in EPIC   [] Wound Image Taken     Wound Care Consulted? No    Attending Nurse:  Nixon Lagunas RN/Staff Member:  Rosio Santana RN   Bowel Function no  issues    Indwelling Catheter Necessity      [REMOVED] Percutaneous Central Line Insertion/Assessment - Triple Lumen  10/11/23 1501 Internal Jugular Left-Line Necessity Review: Hemodynamic instability     De-escalation Antibiotics No       VS and assessment per flow sheet, patient progressing towards goals as tolerated, plan of care reviewed with Cosme Lewis.  , all concerns addressed, will continue to monitor.

## 2023-10-21 NOTE — PROGRESS NOTES
[facility] - Cardiac Medical ICU  Critical Care Medicine  Progress Note    Patient Name: Cosme Lewis  MRN: [id number]  Admission Date: 10/11/2023  Hospital Length of Stay: 10 days  Code Status: Full Code  Attending Provider: Ricky Vasquez MD  Primary Care Provider: Eliecer Ohara III, MD   Principal Problem: Acute on chronic respiratory failure with hypoxia and hypercapnia    Subjective:     HPI:  Mr. Lewis is a 58 year old male with PMH notable for COPD, HFrEF (45% and DD), ESRD on dialysis, HTN, DM presents via EMS from nursing home for altered mental status and hypoxia.  Per EMS, patient was found to have glucose of 38, he received D50 with improvement in his glucose.  He also had SpO2 58% which improved with 15 L nasal cannula.     ED work up revealed VBG 7.27/61 and he was subsequently placed on bipap for work of breathing. He was persistently hypoglycemic with BG 59 --> 34 despite IV dextrose, therefore he was started on a continuous dextrose infusion. Other lab work up revealed WBC 3, stable, H//H 9.5/29, Na 143, CO2 20, Cr/BUN 3.8/38, elevated AST/ALT/alk phos, and troponin 0.174 with no ischemic changes on EKG. He was given IV rocephin and doxycyline. Chest XR with bilateral opacifications R > L.    Critical care consulted for acute hypoxemic respiratory failure and NIPPV      Hospital/ICU Course:  In the MICU, the patient arrived on bipap but was found to still be hypoxic which initially improved after adjusting his bipap settings. He continued to be altered and tried occasionally became combative requiring precedex which was affective when maxed out at 1.4 mcg/kg/hour. During this time he was also found to have worsening hypoglycemia which required continuous D10 administration at a max rate of 200 cc/hour. Eventually his oxygen status started declining and the decision was made to intubate the patient. It is believed that worsening of his oxygen status was in part due the large volume of D10 he received to  maintain his blood sugars. For this reason, it was decided that D20 at a slower rate would be more appropriate, and for this a central line was placed. Additionally, he received a round of hemodialysis. He is receiving remdesivir for COVID and vacomycin/zosyn/doxycycline for community acquired pneumonia coverage/concerning chest x-ray. The patient's sugars started rising on the D20 and eventually he started becoming hyperglycemic  to the 220s which led to the D20 being stopped for the time being and since that time the sugars have been relatively normoglycemic. His abdomen was found to be slightly more distended but KUB was negative for bowel obstruction, likely suggesting ascites as the source.    On 10/12 after passing SAT and SBT, the decision was made to extubate the patient which was done successfully. All sedatives were turned off at this time as well. He was placed on high flow at the time and has been sating well. He continued to receive antibiotics as well as antivirals for CAP coverage/COVID respectively which was able to help improve his likely pneumonia vs ARDS.     Between 10/14 - 10/17 he went back and forth between requiring high flow and bipap, however starting on 10/17 he was able to tolerate nasal canula.         Interval History/Significant Events: No significant overnight events, down to 2L nasal canula oxygen requirements.     Review of Systems   Unable to perform ROS: Acuity of condition   All other systems reviewed and are negative.    Objective:     Vital Signs (Most Recent):  Temp: 98.5 °F (36.9 °C) (10/21/23 0300)  Pulse: 76 (10/21/23 0645)  Resp: 14 (10/21/23 0645)  BP: (!) 160/79 (10/21/23 0645)  SpO2: (!) 94 % (10/21/23 0645) Vital Signs (24h Range):  Temp:  [96.9 °F (36.1 °C)-98.5 °F (36.9 °C)] 98.5 °F (36.9 °C)  Pulse:  [64-76] 76  Resp:  [14-37] 14  SpO2:  [86 %-100 %] 94 %  BP: (131-175)/(70-99) 160/79   Weight: 70.2 kg (154 lb 12.2 oz)  Body mass index is 22.85  kg/m².      Intake/Output Summary (Last 24 hours) at 10/21/2023 0945  Last data filed at 10/20/2023 1330  Gross per 24 hour   Intake --   Output 3500 ml   Net -3500 ml          Physical Exam  Vitals and nursing note reviewed.   Constitutional:       General: He is not in acute distress.     Appearance: He is ill-appearing.      Comments: Increased responsiveness and able to communicate/formulate coherent words   HENT:      Head: Normocephalic and atraumatic.      Right Ear: External ear normal.      Left Ear: External ear normal.      Nose: Nose normal.   Eyes:      General:         Right eye: No discharge.         Left eye: No discharge.      Pupils: Pupils are equal, round, and reactive to light.   Cardiovascular:      Rate and Rhythm: Normal rate and regular rhythm.   Pulmonary:      Effort: Pulmonary effort is normal.      Comments: Improved breathing status, minimal crackles auscultated in the lung fields   Abdominal:      General: There is distension.      Tenderness: There is no abdominal tenderness.      Comments: Some degree of distension still present, however improved from previous days   Musculoskeletal:         General: No swelling or deformity.   Skin:     General: Skin is warm.      Coloration: Skin is not jaundiced.   Psychiatric:         Mood and Affect: Mood normal.         Behavior: Behavior normal.            Vents:  Vent Mode: Spont (10/12/23 1503)  Set Rate: 20 BPM (10/12/23 1141)  Vt Set: 380 mL (10/12/23 1141)  Pressure Support: 5 cmH20 (10/12/23 1503)  PEEP/CPAP: 5 cmH20 (10/12/23 1503)  Oxygen Concentration (%): 5 (10/20/23 0400)  Peak Airway Pressure: 11 cmH20 (10/12/23 1503)  Plateau Pressure: 20 cmH20 (10/12/23 1503)  Total Ve: 12.8 L/m (10/12/23 1503)  Negative Inspiratory Force (cm H2O): 0 (10/12/23 1503)  F/VT Ratio<105 (RSBI): (!) 97.83 (10/12/23 1503)  Lines/Drains/Airways       Peripheral Intravenous Line  Duration                  Hemodialysis AV Fistula Left upper arm -- days          Peripheral IV - Single Lumen 10/16/23 1709 20 G;1 3/4 in Anterior;Right Upper Arm 4 days         Peripheral IV - Single Lumen 10/16/23 1710 20 G;1 3/4 in Anterior;Right Upper Arm 4 days                  Significant Labs:    CBC/Anemia Profile:  Recent Labs   Lab 10/20/23  0210 10/21/23  0334   WBC 4.25 4.22   HGB 7.2* 7.8*   HCT 22.3* 24.2*   PLT 67* 85*   MCV 90 90   RDW 17.8* 18.5*        Chemistries:  Recent Labs   Lab 10/20/23  0210 10/21/23  0334    141   K 3.8 4.1    104   CO2 20* 23   BUN 60* 40*   CREATININE 3.2* 2.6*   CALCIUM 7.4* 7.5*   ALBUMIN 2.2* 2.2*   PROT 5.4* 5.4*   BILITOT 1.0 1.0   ALKPHOS 138* 137*   ALT 24 25   AST 29 27   MG 2.0 1.9   PHOS 3.9 2.9       All pertinent labs within the past 24 hours have been reviewed.    Significant Imaging:  I have reviewed all pertinent imaging results/findings within the past 24 hours.      ABG  Recent Labs   Lab 10/15/23  1310   PH 7.352   PO2 40   PCO2 46.3*   HCO3 25.7   BE 0     Assessment/Plan:     Pulmonary  * Acute on chronic respiratory failure with hypoxia and hypercapnia  Patient with Hypercapnic and Hypoxic Respiratory failure which is Chronic.  he is not on home oxygen. Supplemental oxygen was provided and noted-  Signs/symptoms of respiratory failure include- tachypnea, increased work of breathing and use of accessory muscles. Contributing diagnoses includes - CHF, COPD and fluid volume excess Labs and images were reviewed. Patient Has recent ABG, which has been reviewed. Will treat underlying causes and adjust management of respiratory failure as follows-     58 year old male with multiple medical problems who presented to [facility] ED via EMS for AMS, hypoglycemia, and hypoxia placed on NIPPV. SpO2 in 50s on arrival which improved with HFNC, however, he remained tachypnic with accessory muscle use, therefore he was placed on NIPPV. CXR with bilateral opacifications R > L and BNP elevated > 4900.     Intubated and mechanically  ventilated, extubated to NC 10/13, sating well  Chest x-ray concerning for pneumonia vs ARDS    · Weaning off O2; currently on 2L NC with minimal subjective dyspnea at rest. Trial of RA on 10/20 was unsuccessful, consider retrying again today.  · Remdesivir completed. Dexamethasone 6 mg daily  · CAP coverage with Vanc/Zosyn completed   · HD for volume clearance per Nephro schedule  · Duoneb nebulizer, tiotropium and Fluticasone furoate / Vilanterol       Acute respiratory failure with hypoxia and hypercapnia  Patient with Hypoxic Respiratory failure which is Acute.  he is not on home oxygen. Supplemental oxygen was provided and noted.  Signs/symptoms of respiratory failure include- respiratory distress. Contributing diagnoses includes - ARDS Labs and images were reviewed. Patient Has recent ABG, which has been reviewed. Will treat underlying causes and adjust management of respiratory failure as follows- continuing antibiotics and antivirals, adjustments of mechanical ventilation, HD and fluid regulation.     Extubated to 5L NC morning of 10/13. In no acute distress  VBG 7.2552  However mental status improving    -continue to monitor on NC  -ABG as warranted  -Currently on nasal canula    COPD (chronic obstructive pulmonary disease)  --Duo nebs Q4wake  --Currently on nasal canula 2L  --Fluticasone furoate / Vilanterol and Salmeterol     Cardiac/Vascular  HLD (hyperlipidemia)  -- LFT's have improved, atorvastatin 40 mg daily      Chronic diastolic heart failure  ECHO 09/23:       Left Ventricle: The left ventricle is normal in size. Increased wall thickness. There is concentric hypertrophy. Mild global hypokinesis present. There is mildly reduced systolic function with a visually estimated ejection fraction of 40 - 45%. There is indeterminate diastolic function.    Right Ventricle: Normal right ventricular cavity size. Right ventricle wall motion  is normal. Systolic function is borderline low.    Left Atrium:  Left atrium is severely dilated.    Tricuspid Valve: There is mild regurgitation.    Pericardium: There is a trivial effusion. Echodensity seen adjacent to the visceral pericardium of uncertain significance. This may be pericardial fat although other etiologies not excluded. Correlate clinically.    PASP is at least 46 mmHg.    BNP > 4900    --Volume removal per iHD. Appreciate nephrology assistance   --Blood pressure started becoming elevated on 10/17. Home nifedipine and lisinopril restarted as pressures have been within normal limits. Hydralazine pushes prn should pressures rise.     Renal/  ESRD (end stage renal disease)  Outpatient HD unit: [facility]   HD tx days: MWF   HD tx time: 4hrs   HD access: LUE AVF   HD modality: iHD   Residual urine: Anuric   Estimated Creatinine Clearance: 33.8 mL/min (A) (based on SCr of 2.3 mg/dL (H)).    Toelrating iHD well    -- Nephrology consulted for iHD management. Appreciate their assistance   -- CMP daily and trend   -- Avoid nephrotoxins   -- Renally dose all medications to appropriate GFR / CrCl   -- Hgb > 7 and MAP > 65 goals     Hematology  History of pulmonary embolism  Diagnosed 10/2022  Repeat CT 3/2023 with resolution    -heparin stopped as patient's platelet level fell to 35,000. Has since recovered to 85,000 so home eliquis restarted    Oncology  Anemia in ESRD (end-stage renal disease)  Recent Labs   Lab 10/21/23  0334   WBC 4.22   RBC 2.70*   HGB 7.8*   HCT 24.2*   PLT 85*   MCV 90   MCH 28.9   MCHC 32.2     · CBC daily and trend   · Transfuse for hgb < 7   · Receives EPO with iHD    Endocrine  Hyperglycemia due to type 2 diabetes mellitus  -Started having hyperglycemic episodes on 10/21 so detemir 100 added on top of the moderate sliding scale insulin.      Palliative Care  Advance care planning  Palliative care consulted for outpatient sx mgmt recs; appreciate assist       Critical Care Daily Checklist:    A: Awake: RASS Goal/Actual Goal: RASS  Goal: 0-->alert and calm  Actual:     B: Spontaneous Breathing Trial Performed? Spon. Breathing Trial Initiated?: Not initiated (10/12/23 0742)   C: SAT & SBT Coordinated?  Passed                      D: Delirium: CAM-ICU Overall CAM-ICU: Negative   E: Early Mobility Performed? No   F: Feeding Goal: Goals: Meet % EEN, EPN by RD f/u date  Status: Nutrition Goal Status: goal met   Current Diet Order   Procedures    Diet diabetic Minced & Moist (IDDSI Level 5); 2000 Calorie     Order Specific Question:   Additional Diet Options:     Answer:   Minced & Moist (IDDSI Level 5)     Order Specific Question:   Total calories:     Answer:   2000 Calorie      AS: Analgesia/Sedation None   T: Thromboembolic Prophylaxis Eliquis   H: HOB > 300 Yes   U: Stress Ulcer Prophylaxis (if needed) None   G: Glucose Control MDSSI, 2 units detemir BID   B: Bowel Function Stool Occurrence: 2   I: Indwelling Catheter (Lines & Dutta) Necessity PIVs   D: De-escalation of Antimicrobials/Pharmacotherapies Off all antibiotics    Plan for the day/ETD Wean oxygen/possible RA trial, glucose control    Code Status:  Family/Goals of Care: Full Code         Critical secondary to Patient has a condition that poses threat to life and bodily function: Severe Respiratory Distress      Critical care was time spent personally by me on the following activities: development of treatment plan with patient or surrogate and bedside caregivers, discussions with consultants, evaluation of patient's response to treatment, examination of patient, ordering and performing treatments and interventions, ordering and review of laboratory studies, ordering and review of radiographic studies, pulse oximetry, re-evaluation of patient's condition. This critical care time did not overlap with that of any other provider or involve time for any procedures.     Robbie Fields MD  Critical Care Medicine  [facility] - Cardiac Medical ICU

## 2023-10-21 NOTE — ASSESSMENT & PLAN NOTE
-Started having hyperglycemic episodes on 10/21 so detemir 100 added on top of the moderate sliding scale insulin.

## 2023-10-21 NOTE — PLAN OF CARE
Problem: Impaired Wound Healing  Goal: Optimal Wound Healing  Outcome: Ongoing, Progressing     Problem: Fall Injury Risk  Goal: Absence of Fall and Fall-Related Injury  Outcome: Ongoing, Progressing   ..  MICU DAILY GOALS     Family/Goals of care/Code Status   Code Status: Full Code    24H Vital Sign Range  Temp:  [96.3 °F (35.7 °C)-98.5 °F (36.9 °C)]   Pulse:  [64-75]   Resp:  [18-43]   BP: (131-175)/()   SpO2:  [86 %-100 %]      Shift Events   No acute events throughout shift Patient remained on 4L NC throughout the night. Patient not placed on bipap due to aspiration risk.      AWAKE RASS: Goal - RASS Goal: 0-->alert and calm  Actual - RASS (Osorio Agitation-Sedation Scale): alert and calm    Restraint necessity: Not necessary   BREATHE SBT: Not intubated    Coordinate A & B, analgesics/sedatives Pain: managed   SAT: Not intubated   Delirium CAM-ICU: Overall CAM-ICU: Negative   Early(intubated/ Progressive (non-intubated) Mobility MOVE Screen (INTUBATED ONLY): Not intubated    Activity: Activity Management: Rolling - L1   Feeding/Nutrition Diet order: Diet/Nutrition Received: mechanical/dental soft, Specialty Diet/Nutrition Received: renal diet   Thrombus DVT prophylaxis: VTE Required Core Measure: Pharmacological prophylaxis initiated/maintained   HOB Elevation Head of Bed (HOB) Positioning: HOB at 20-30 degrees   Ulcer Prophylaxis GI: no   Glucose control uncontrolled Glycemic Management: blood glucose monitored   Skin Skin assessed during: Daily Assessment    Sacrum intact? No  Heels intact? Yes  Surgical wound? No    [] No Altered Skin Integrity Present    []Prevention Measures Documented    [] Altered Skin Integrity Present or Discovered   [] LDA present /added in EPIC   [] Wound Image Taken     Wound Care Consulted? No    Attending Nurse:  Nixon Lagunas RN/Staff Member:  Rosio Santana RN   Bowel Function no issues    Indwelling Catheter Necessity      [REMOVED] Percutaneous Central Line  Insertion/Assessment - Triple Lumen  10/11/23 1501 Internal Jugular Left-Line Necessity Review: Hemodynamic instability     De-escalation Antibiotics No       VS and assessment per flow sheet, patient progressing towards goals as tolerated, plan of care reviewed with [unfilled] and family, all concerns addressed, will continue to monitor.

## 2023-10-21 NOTE — ASSESSMENT & PLAN NOTE
Recent Labs   Lab 10/21/23  0334   WBC 4.22   RBC 2.70*   HGB 7.8*   HCT 24.2*   PLT 85*   MCV 90   MCH 28.9   MCHC 32.2     · CBC daily and trend   · Transfuse for hgb < 7   · Receives EPO with iHD

## 2023-10-21 NOTE — SUBJECTIVE & OBJECTIVE
Interval History/Significant Events: No significant overnight events, down to 2L nasal canula oxygen requirements.     Review of Systems   Unable to perform ROS: Acuity of condition   All other systems reviewed and are negative.    Objective:     Vital Signs (Most Recent):  Temp: 98.5 °F (36.9 °C) (10/21/23 0300)  Pulse: 76 (10/21/23 0645)  Resp: 14 (10/21/23 0645)  BP: (!) 160/79 (10/21/23 0645)  SpO2: (!) 94 % (10/21/23 0645) Vital Signs (24h Range):  Temp:  [96.9 °F (36.1 °C)-98.5 °F (36.9 °C)] 98.5 °F (36.9 °C)  Pulse:  [64-76] 76  Resp:  [14-37] 14  SpO2:  [86 %-100 %] 94 %  BP: (131-175)/(70-99) 160/79   Weight: 70.2 kg (154 lb 12.2 oz)  Body mass index is 22.85 kg/m².      Intake/Output Summary (Last 24 hours) at 10/21/2023 0945  Last data filed at 10/20/2023 1330  Gross per 24 hour   Intake --   Output 3500 ml   Net -3500 ml          Physical Exam  Vitals and nursing note reviewed.   Constitutional:       General: He is not in acute distress.     Appearance: He is ill-appearing.      Comments: Increased responsiveness and able to communicate/formulate coherent words   HENT:      Head: Normocephalic and atraumatic.      Right Ear: External ear normal.      Left Ear: External ear normal.      Nose: Nose normal.   Eyes:      General:         Right eye: No discharge.         Left eye: No discharge.      Pupils: Pupils are equal, round, and reactive to light.   Cardiovascular:      Rate and Rhythm: Normal rate and regular rhythm.   Pulmonary:      Effort: Pulmonary effort is normal.      Comments: Improved breathing status, minimal crackles auscultated in the lung fields   Abdominal:      General: There is distension.      Tenderness: There is no abdominal tenderness.      Comments: Some degree of distension still present, however improved from previous days   Musculoskeletal:         General: No swelling or deformity.   Skin:     General: Skin is warm.      Coloration: Skin is not jaundiced.   Psychiatric:          Mood and Affect: Mood normal.         Behavior: Behavior normal.            Vents:  Vent Mode: Spont (10/12/23 1503)  Set Rate: 20 BPM (10/12/23 1141)  Vt Set: 380 mL (10/12/23 1141)  Pressure Support: 5 cmH20 (10/12/23 1503)  PEEP/CPAP: 5 cmH20 (10/12/23 1503)  Oxygen Concentration (%): 5 (10/20/23 0400)  Peak Airway Pressure: 11 cmH20 (10/12/23 1503)  Plateau Pressure: 20 cmH20 (10/12/23 1503)  Total Ve: 12.8 L/m (10/12/23 1503)  Negative Inspiratory Force (cm H2O): 0 (10/12/23 1503)  F/VT Ratio<105 (RSBI): (!) 97.83 (10/12/23 1503)  Lines/Drains/Airways       Peripheral Intravenous Line  Duration                  Hemodialysis AV Fistula Left upper arm -- days         Peripheral IV - Single Lumen 10/16/23 1709 20 G;1 3/4 in Anterior;Right Upper Arm 4 days         Peripheral IV - Single Lumen 10/16/23 1710 20 G;1 3/4 in Anterior;Right Upper Arm 4 days                  Significant Labs:    CBC/Anemia Profile:  Recent Labs   Lab 10/20/23  0210 10/21/23  0334   WBC 4.25 4.22   HGB 7.2* 7.8*   HCT 22.3* 24.2*   PLT 67* 85*   MCV 90 90   RDW 17.8* 18.5*        Chemistries:  Recent Labs   Lab 10/20/23  0210 10/21/23  0334    141   K 3.8 4.1    104   CO2 20* 23   BUN 60* 40*   CREATININE 3.2* 2.6*   CALCIUM 7.4* 7.5*   ALBUMIN 2.2* 2.2*   PROT 5.4* 5.4*   BILITOT 1.0 1.0   ALKPHOS 138* 137*   ALT 24 25   AST 29 27   MG 2.0 1.9   PHOS 3.9 2.9       All pertinent labs within the past 24 hours have been reviewed.    Significant Imaging:  I have reviewed all pertinent imaging results/findings within the past 24 hours.

## 2023-10-21 NOTE — PLAN OF CARE
MICU DAILY GOALS     Family/Goals of care/Code Status   Code Status: Full Code    24H Vital Sign Range  Temp:  [97.2 °F (36.2 °C)-98.6 °F (37 °C)]   Pulse:  [64-76]   Resp:  [14-37]   BP: (134-175)/(69-99)   SpO2:  [86 %-98 %]      Shift Events   No acute events throughout shift, mother, son and brother visited today.    AWAKE RASS: Goal - RASS Goal: 0-->alert and calm  Actual - RASS (Osorio Agitation-Sedation Scale): alert and calm    Restraint necessity: Not necessary   BREATHE SBT: Not intubated    Coordinate A & B, analgesics/sedatives Pain: managed   SAT: Not intubated   Delirium CAM-ICU: Overall CAM-ICU: Negative   Early(intubated/ Progressive (non-intubated) Mobility MOVE Screen (INTUBATED ONLY): Pass    Activity: Activity Management: Rolling - L1   Feeding/Nutrition Diet order: Diet/Nutrition Received: mechanical/dental soft, Specialty Diet/Nutrition Received: renal diet   Thrombus DVT prophylaxis: VTE Required Core Measure: Pharmacological prophylaxis initiated/maintained   HOB Elevation Head of Bed (HOB) Positioning: HOB at 30-45 degrees   Ulcer Prophylaxis GI: yes   Glucose control managed Glycemic Management: blood glucose monitored   Skin Skin assessed during: Daily Assessment    Sacrum intact? No  Heels intact? Yes  Surgical wound? No    [] No Altered Skin Integrity Present    []Prevention Measures Documented    [] Altered Skin Integrity Present or Discovered   [] LDA present /added in EPIC   [] Wound Image Taken     Wound Care Consulted? No    Attending Nurse:  Sandy Lagunas RN/Staff Member:  Rosio Santana RN   Bowel Function diarrhea    Indwelling Catheter Necessity      [REMOVED] Percutaneous Central Line Insertion/Assessment - Triple Lumen  10/11/23 1501 Internal Jugular Left-Line Necessity Review: Hemodynamic instability  na   De-escalation Antibiotics na       VS and assessment per flow sheet, patient progressing towards goals as tolerated, plan of care reviewed with [unfilled] and family,  all concerns addressed, will continue to monitor.

## 2023-10-21 NOTE — ASSESSMENT & PLAN NOTE
--Duo nebs Q4wake  --Currently on nasal canula 2L  --Fluticasone furoate / Vilanterol and Salmeterol

## 2023-10-21 NOTE — ASSESSMENT & PLAN NOTE
Patient with Hypercapnic and Hypoxic Respiratory failure which is Chronic.  he is not on home oxygen. Supplemental oxygen was provided and noted-  Signs/symptoms of respiratory failure include- tachypnea, increased work of breathing and use of accessory muscles. Contributing diagnoses includes - CHF, COPD and fluid volume excess Labs and images were reviewed. Patient Has recent ABG, which has been reviewed. Will treat underlying causes and adjust management of respiratory failure as follows-     58 year old male with multiple medical problems who presented to [facility] ED via EMS for AMS, hypoglycemia, and hypoxia placed on NIPPV. SpO2 in 50s on arrival which improved with HFNC, however, he remained tachypnic with accessory muscle use, therefore he was placed on NIPPV. CXR with bilateral opacifications R > L and BNP elevated > 4900.     Intubated and mechanically ventilated, extubated to NC 10/13, sating well  Chest x-ray concerning for pneumonia vs ARDS    · Weaning off O2; currently on 2L NC with minimal subjective dyspnea at rest. Trial of RA on 10/20 was unsuccessful, consider retrying again today.  · Remdesivir completed. Dexamethasone 6 mg daily  · CAP coverage with Vanc/Zosyn completed   · HD for volume clearance per Nephro schedule  · Duoneb nebulizer, tiotropium and Fluticasone furoate / Vilanterol

## 2023-10-21 NOTE — ASSESSMENT & PLAN NOTE
ESRD on iHD MWF  [facility] [place]  4 hours  EDW:  71 kg  UMA AVF    -Bedside Hd completed 10/20 , net uf 3 liters removed,  Plan/Recommendations:   will continue to monitor renal function   -continue strict I/O's  -RFP daily  -renal diet when advanced, 1L fluid restrictions  -Phos WNL, no need for binders at this time

## 2023-10-21 NOTE — SUBJECTIVE & OBJECTIVE
Interval History:   NAEON.  Patient seen and examined , No any active complaints  Review of patient's allergies indicates:   Allergen Reactions    Heparin analogues      HIT KARLOS weakly positive, ELISE pending     Current Facility-Administered Medications   Medication Frequency    acetaminophen tablet 650 mg Q6H PRN    apixaban tablet 2.5 mg BID    atorvastatin tablet 40 mg Daily    carvediloL tablet 12.5 mg BID WM    dextrose 10% bolus 125 mL 125 mL PRN    dextrose 10% bolus 250 mL 250 mL PRN    epoetin xavier-epbx injection 3,510 Units Every Mon, Wed, Fri    FLUoxetine capsule 40 mg Daily    fluticasone furoate-vilanteroL 100-25 mcg/dose diskus inhaler 1 puff Daily    glucagon (human recombinant) injection 1 mg PRN    glucose chewable tablet 16 g PRN    glucose chewable tablet 24 g PRN    hydrALAZINE injection 10 mg Q6H PRN    insulin aspart U-100 pen 0-10 Units QID (AC + HS) PRN    insulin detemir U-100 (Levemir) pen 2 Units BID    multivitamin tablet Daily    NIFEdipine 24 hr tablet 60 mg BID    sodium chloride 0.9% bolus 250 mL 250 mL PRN    tiotropium bromide 2.5 mcg/actuation inhaler 2 puff Daily       Objective:     Vital Signs (Most Recent):  Temp: 98.6 °F (37 °C) (10/21/23 0701)  Pulse: 76 (10/21/23 1100)  Resp: (!) 24 (10/21/23 1100)  BP: (!) 147/81 (10/21/23 1000)  SpO2: (!) 92 % (10/21/23 1000) Vital Signs (24h Range):  Temp:  [96.9 °F (36.1 °C)-98.6 °F (37 °C)] 98.6 °F (37 °C)  Pulse:  [64-76] 76  Resp:  [14-37] 24  SpO2:  [86 %-98 %] 92 %  BP: (134-175)/(73-99) 147/81     Weight: 70.2 kg (154 lb 12.2 oz) (10/18/23 1100)  Body mass index is 22.85 kg/m².  Body surface area is 1.85 meters squared.    I/O last 3 completed shifts:  In: -   Out: 3500 [Other:3500]     Physical Exam  Vitals and nursing note reviewed.   Constitutional:       General: He is not in acute distress.     Appearance: He is well-developed. He is ill-appearing.      Interventions: Nasal cannula in place.   HENT:      Head: Normocephalic  and atraumatic.      Nose: No congestion or rhinorrhea.   Eyes:      General: No scleral icterus.        Right eye: No discharge.         Left eye: No discharge.      Extraocular Movements: Extraocular movements intact.      Conjunctiva/sclera: Conjunctivae normal.   Neck:      Vascular: JVD present.   Cardiovascular:      Rate and Rhythm: Normal rate and regular rhythm.      Heart sounds: No murmur heard.     No friction rub. No gallop.      Comments: UMA AVF  Pulmonary:      Effort: No respiratory distress.      Breath sounds: Rhonchi and rales present. No wheezing.   Abdominal:      General: There is no distension.      Palpations: Abdomen is soft.      Tenderness: There is no abdominal tenderness.   Musculoskeletal:         General: No swelling.      Cervical back: Normal range of motion.      Right lower leg: Edema present.      Left lower leg: Edema present.   Skin:     General: Skin is warm and dry.   Neurological:      Mental Status: He is alert. Mental status is at baseline.          Significant Labs:  CBC:   Recent Labs   Lab 10/21/23  0334   WBC 4.22   RBC 2.70*   HGB 7.8*   HCT 24.2*   PLT 85*   MCV 90   MCH 28.9   MCHC 32.2       CMP:   Recent Labs   Lab 10/21/23  0334   GLU 66*   CALCIUM 7.5*   ALBUMIN 2.2*   PROT 5.4*      K 4.1   CO2 23      BUN 40*   CREATININE 2.6*   ALKPHOS 137*   ALT 25   AST 27   BILITOT 1.0

## 2023-10-21 NOTE — PROGRESS NOTES
[facility]  Nephrology  Progress Note    Patient Name: Cosme Lewis  MRN: [id number]  Admission Date: 10/11/2023  Hospital Length of Stay: 10 days  Attending Provider: Ricky Vasquez MD   Primary Care Physician: Eliecer Ohara III, MD  Principal Problem:Acute on chronic respiratory failure with hypoxia and hypercapnia    Subjective:     HPI: Cosme Lewis is a 57 yo male with PMHx of ESRD on iHD MWF who presents from his NH with AMS, hypoxia, and hypoglycemia.  He was placed on BiPAP and transferred to ICU for higher level of care.  CXR with evidence of pulmonary edema, BNP elevated > 3800.  Labs on chemistry without emergent electrolyte abnormalities consistent with ESRD status.  CBC without leukocytosis.  He was found to be COVID + and started on Broad spectrum Abx and steroids. He required D5W gtt for persistent hypoglycemia.  Nephrology has been consulted for ESRD management while IP.      Interval History:   NAEON.  Patient seen and examined , No any active complaints  Review of patient's allergies indicates:   Allergen Reactions    Heparin analogues      HIT KARLOS weakly positive, ELISE pending     Current Facility-Administered Medications   Medication Frequency    acetaminophen tablet 650 mg Q6H PRN    apixaban tablet 2.5 mg BID    atorvastatin tablet 40 mg Daily    carvediloL tablet 12.5 mg BID WM    dextrose 10% bolus 125 mL 125 mL PRN    dextrose 10% bolus 250 mL 250 mL PRN    epoetin xavier-epbx injection 3,510 Units Every Mon, Wed, Fri    FLUoxetine capsule 40 mg Daily    fluticasone furoate-vilanteroL 100-25 mcg/dose diskus inhaler 1 puff Daily    glucagon (human recombinant) injection 1 mg PRN    glucose chewable tablet 16 g PRN    glucose chewable tablet 24 g PRN    hydrALAZINE injection 10 mg Q6H PRN    insulin aspart U-100 pen 0-10 Units QID (AC + HS) PRN    insulin detemir U-100 (Levemir) pen 2 Units BID    multivitamin tablet Daily    NIFEdipine 24 hr tablet 60 mg BID    sodium chloride  0.9% bolus 250 mL 250 mL PRN    tiotropium bromide 2.5 mcg/actuation inhaler 2 puff Daily       Objective:     Vital Signs (Most Recent):  Temp: 98.6 °F (37 °C) (10/21/23 0701)  Pulse: 76 (10/21/23 1100)  Resp: (!) 24 (10/21/23 1100)  BP: (!) 147/81 (10/21/23 1000)  SpO2: (!) 92 % (10/21/23 1000) Vital Signs (24h Range):  Temp:  [96.9 °F (36.1 °C)-98.6 °F (37 °C)] 98.6 °F (37 °C)  Pulse:  [64-76] 76  Resp:  [14-37] 24  SpO2:  [86 %-98 %] 92 %  BP: (134-175)/(73-99) 147/81     Weight: 70.2 kg (154 lb 12.2 oz) (10/18/23 1100)  Body mass index is 22.85 kg/m².  Body surface area is 1.85 meters squared.    I/O last 3 completed shifts:  In: -   Out: 3500 [Other:3500]    Physical Exam  Vitals and nursing note reviewed.   Constitutional:       General: He is not in acute distress.     Appearance: He is well-developed. He is ill-appearing.      Interventions: Nasal cannula in place.   HENT:      Head: Normocephalic and atraumatic.      Nose: No congestion or rhinorrhea.   Eyes:      General: No scleral icterus.        Right eye: No discharge.         Left eye: No discharge.      Extraocular Movements: Extraocular movements intact.      Conjunctiva/sclera: Conjunctivae normal.   Neck:      Vascular: JVD present.   Cardiovascular:      Rate and Rhythm: Normal rate and regular rhythm.      Heart sounds: No murmur heard.     No friction rub. No gallop.      Comments: UMA AVF  Pulmonary:      Effort: No respiratory distress.      Breath sounds: Rhonchi and rales present. No wheezing.   Abdominal:      General: There is no distension.      Palpations: Abdomen is soft.      Tenderness: There is no abdominal tenderness.   Musculoskeletal:         General: No swelling.      Cervical back: Normal range of motion.      Right lower leg: Edema present.      Left lower leg: Edema present.   Skin:     General: Skin is warm and dry.   Neurological:      Mental Status: He is alert. Mental status is at baseline.          Significant  Labs:  CBC:   Recent Labs   Lab 10/21/23  0334   WBC 4.22   RBC 2.70*   HGB 7.8*   HCT 24.2*   PLT 85*   MCV 90   MCH 28.9   MCHC 32.2       CMP:   Recent Labs   Lab 10/21/23  0334   GLU 66*   CALCIUM 7.5*   ALBUMIN 2.2*   PROT 5.4*      K 4.1   CO2 23      BUN 40*   CREATININE 2.6*   ALKPHOS 137*   ALT 25   AST 27   BILITOT 1.0            Assessment/Plan:     Renal/  ESRD (end stage renal disease)  ESRD on iHD F  [facility]  [facility]. Holden  4 hours  EDW:  71 kg  UMA AVF    -Bedside Hd completed 10/20 , net uf 3 liters removed,  Plan/Recommendations:   will continue to monitor renal function   -continue strict I/O's  -RFP daily  -renal diet when advanced, 1L fluid restrictions  -Phos WNL, no need for binders at this time    Oncology  Anemia in ESRD (end-stage renal disease)  Below goal for ESRD  -EPO with HD          Thank you for your consult. I will follow-up with patient. Please contact us if you have any additional questions.    Matthew Nettles MD  Nephrology  [facility]    ATTENDING PHYSICIAN ATTESTATION  I have personally verified the history and examined the patient. I thoroughly reviewed the demographic, clinical, laboratorial and imaging information available in medical records. I agree with the assessment and recommendations provided by the subspecialty resident who was under my supervision.

## 2023-10-22 NOTE — SUBJECTIVE & OBJECTIVE
Interval History/Significant Events:       Objective:     Vital Signs (Most Recent):  Temp: 97.5 °F (36.4 °C) (10/22/23 1500)  Pulse: 72 (10/22/23 1548)  Resp: (!) 29 (10/22/23 1548)  BP: (!) 148/72 (10/22/23 1500)  SpO2: (!) 92 % (10/22/23 1548) Vital Signs (24h Range):  Temp:  [97.3 °F (36.3 °C)-98.1 °F (36.7 °C)] 97.5 °F (36.4 °C)  Pulse:  [67-78] 72  Resp:  [13-31] 29  SpO2:  [84 %-100 %] 92 %  BP: (137-169)/(70-96) 148/72   Weight: 70.2 kg (154 lb 12.2 oz)  Body mass index is 22.85 kg/m².      Intake/Output Summary (Last 24 hours) at 10/22/2023 1615  Last data filed at 10/22/2023 0600  Gross per 24 hour   Intake 24.93 ml   Output --   Net 24.93 ml          Physical Exam  Vitals and nursing note reviewed.   Constitutional:       General: He is not in acute distress.     Appearance: He is ill-appearing.      Comments: Increased responsiveness and able to communicate/formulate coherent words   HENT:      Head: Normocephalic and atraumatic.      Right Ear: External ear normal.      Left Ear: External ear normal.      Nose: Nose normal.   Eyes:      General:         Right eye: No discharge.         Left eye: No discharge.      Pupils: Pupils are equal, round, and reactive to light.   Cardiovascular:      Rate and Rhythm: Normal rate and regular rhythm.   Pulmonary:      Effort: Pulmonary effort is normal.      Comments: Improved breathing status, minimal crackles auscultated in the lung fields   Abdominal:      General: There is distension.      Tenderness: There is no abdominal tenderness.      Comments: Some degree of distension still present, however improved from previous days   Musculoskeletal:         General: No swelling or deformity.   Skin:     General: Skin is warm.      Coloration: Skin is not jaundiced.   Psychiatric:         Mood and Affect: Mood normal.         Behavior: Behavior normal.            Vents:  Vent Mode: Spont (10/12/23 1503)  Set Rate: 20 BPM (10/12/23 1141)  Vt Set: 380 mL (10/12/23  1141)  Pressure Support: 5 cmH20 (10/12/23 1503)  PEEP/CPAP: 5 cmH20 (10/12/23 1503)  Oxygen Concentration (%): 28 (10/22/23 1548)  Peak Airway Pressure: 11 cmH20 (10/12/23 1503)  Plateau Pressure: 20 cmH20 (10/12/23 1503)  Total Ve: 12.8 L/m (10/12/23 1503)  Negative Inspiratory Force (cm H2O): 0 (10/12/23 1503)  F/VT Ratio<105 (RSBI): (!) 97.83 (10/12/23 1503)  Lines/Drains/Airways       Peripheral Intravenous Line  Duration                  Hemodialysis AV Fistula Left upper arm -- days         Midline Catheter Insertion/Assessment  - Single Lumen 10/22/23 1520 Right brachial vein 20g x 10cm <1 day                  Significant Labs:    CBC/Anemia Profile:  Recent Labs   Lab 10/21/23  0334 10/22/23  0319   WBC 4.22 5.10   HGB 7.8* 8.2*   HCT 24.2* 25.8*   PLT 85* 128*   MCV 90 91   RDW 18.5* 18.4*        Chemistries:  Recent Labs   Lab 10/21/23  0334 10/22/23  0319    137   K 4.1 4.8    101   CO2 23 23   BUN 40* 53*   CREATININE 2.6* 3.5*   CALCIUM 7.5* 7.7*   ALBUMIN 2.2* 2.2*   PROT 5.4* 5.8*   BILITOT 1.0 1.0   ALKPHOS 137* 151*   ALT 25 23   AST 27 24   MG 1.9 2.0   PHOS 2.9 4.0       All pertinent labs within the past 24 hours have been reviewed.    Significant Imaging:  I have reviewed all pertinent imaging results/findings within the past 24 hours.

## 2023-10-22 NOTE — PROGRESS NOTES
[facility]  Nephrology  Progress Note    Patient Name: Cosme Lewis  MRN: [id number]  Admission Date: 10/11/2023  Hospital Length of Stay: 11 days  Attending Provider: Ricky Vasquez MD   Primary Care Physician: Eliecer Ohara III, MD  Principal Problem:Acute on chronic respiratory failure with hypoxia and hypercapnia    Subjective:     HPI: Cosme Lewis is a 59 yo male with PMHx of ESRD on iHD MWF who presents from his NH with AMS, hypoxia, and hypoglycemia.  He was placed on BiPAP and transferred to ICU for higher level of care.  CXR with evidence of pulmonary edema, BNP elevated > 3800.  Labs on chemistry without emergent electrolyte abnormalities consistent with ESRD status.  CBC without leukocytosis.  He was found to be COVID + and started on Broad spectrum Abx and steroids. He required D5W gtt for persistent hypoglycemia.  Nephrology has been consulted for ESRD management while IP.      Interval History:   NAEON.  Patient seen and examined , No any active complaints  Review of patient's allergies indicates:   Allergen Reactions    Heparin analogues      HIT KARLOS weakly positive, ELISE pending     Current Facility-Administered Medications   Medication Frequency    acetaminophen tablet 650 mg Q6H PRN    apixaban tablet 2.5 mg BID    atorvastatin tablet 40 mg Daily    carvediloL tablet 12.5 mg BID WM    dextrose 10% bolus 125 mL 125 mL PRN    dextrose 10% bolus 250 mL 250 mL PRN    epoetin xavier-epbx injection 3,510 Units Every Mon, Wed, Fri    FLUoxetine capsule 40 mg Daily    fluticasone furoate-vilanteroL 100-25 mcg/dose diskus inhaler 1 puff Daily    glucagon (human recombinant) injection 1 mg PRN    glucose chewable tablet 16 g PRN    glucose chewable tablet 24 g PRN    hydrALAZINE injection 10 mg Q6H PRN    insulin aspart U-100 pen 0-10 Units QID (AC + HS) PRN    multivitamin tablet Daily    NIFEdipine 24 hr tablet 60 mg BID    sodium chloride 0.9% bolus 250 mL 250 mL PRN    tiotropium bromide  2.5 mcg/actuation inhaler 2 puff Daily       Objective:     Vital Signs (Most Recent):  Temp: 97.6 °F (36.4 °C) (10/22/23 1100)  Pulse: 73 (10/22/23 1200)  Resp: 14 (10/22/23 1200)  BP: (!) 163/96 (10/22/23 0800)  SpO2: (!) 91 % (10/22/23 1200) Vital Signs (24h Range):  Temp:  [97.3 °F (36.3 °C)-98.2 °F (36.8 °C)] 97.6 °F (36.4 °C)  Pulse:  [68-78] 73  Resp:  [13-31] 14  SpO2:  [84 %-100 %] 91 %  BP: (137-169)/(70-96) 163/96     Weight: 70.2 kg (154 lb 12.2 oz) (10/18/23 1100)  Body mass index is 22.85 kg/m².  Body surface area is 1.85 meters squared.    I/O last 3 completed shifts:  In: 24.9 [IV Piggyback:24.9]  Out: -     Physical Exam  Vitals and nursing note reviewed.   Constitutional:       General: He is not in acute distress.     Appearance: He is well-developed. He is ill-appearing.      Interventions: Nasal cannula in place.   HENT:      Head: Normocephalic and atraumatic.      Nose: No congestion or rhinorrhea.   Eyes:      General: No scleral icterus.        Right eye: No discharge.         Left eye: No discharge.      Extraocular Movements: Extraocular movements intact.      Conjunctiva/sclera: Conjunctivae normal.   Neck:      Vascular: JVD present.   Cardiovascular:      Rate and Rhythm: Normal rate and regular rhythm.      Heart sounds: No murmur heard.     No friction rub. No gallop.      Comments: UMA AVF  Pulmonary:      Effort: No respiratory distress.      Breath sounds: Rhonchi and rales present. No wheezing.   Abdominal:      General: There is no distension.      Palpations: Abdomen is soft.      Tenderness: There is no abdominal tenderness.   Musculoskeletal:         General: No swelling.      Cervical back: Normal range of motion.      Right lower leg: Edema present.      Left lower leg: Edema present.   Skin:     General: Skin is warm and dry.   Neurological:      Mental Status: He is alert. Mental status is at baseline.          Significant Labs:  CBC:   Recent Labs   Lab 10/22/23  3315    WBC 5.10   RBC 2.83*   HGB 8.2*   HCT 25.8*   *   MCV 91   MCH 29.0   MCHC 31.8*       CMP:   Recent Labs   Lab 10/22/23  0319   GLU 64*   CALCIUM 7.7*   ALBUMIN 2.2*   PROT 5.8*      K 4.8   CO2 23      BUN 53*   CREATININE 3.5*   ALKPHOS 151*   ALT 23   AST 24   BILITOT 1.0            Assessment/Plan:     Renal/  ESRD (end stage renal disease)  ESRD on iHD F  [facility]  Dr. Holden  4 hours  EDW:  71 kg  UMA AVF    -Bedside Hd completed 10/20 , net uf 3 liters removed,  Plan/Recommendations:  -will continue to monitor renal function and oxygen requirements are coming down, electrolyte non emergent,  will access further need for dialysis tomorrow  -continue strict I/O's  -RFP daily  -renal diet when advanced, 1L fluid restrictions  -Phos WNL, no need for binders at this time    Oncology  Anemia in ESRD (end-stage renal disease)  Below goal for ESRD  -EPO with HD          Thank you for your consult. I will follow-up with patient. Please contact us if you have any additional questions.    Matthew Nettles MD  Nephrology  [facility]    ATTENDING PHYSICIAN ATTESTATION  I have personally verified the history and examined the patient. I thoroughly reviewed the demographic, clinical, laboratorial and imaging information available in medical records. I agree with the assessment and recommendations provided by the subspecialty resident who was under my supervision.

## 2023-10-22 NOTE — ASSESSMENT & PLAN NOTE
Patient with Hypercapnic and Hypoxic Respiratory failure which is Chronic.  he is not on home oxygen. Supplemental oxygen was provided and noted-  Signs/symptoms of respiratory failure include- tachypnea, increased work of breathing and use of accessory muscles. Contributing diagnoses includes - CHF, COPD and fluid volume excess Labs and images were reviewed. Patient Has recent ABG, which has been reviewed. Will treat underlying causes and adjust management of respiratory failure as follows-     58 year old male with multiple medical problems who presented to [facility] ED via EMS for AMS, hypoglycemia, and hypoxia placed on NIPPV. SpO2 in 50s on arrival which improved with HFNC, however, he remained tachypnic with accessory muscle use, therefore he was placed on NIPPV. CXR with bilateral opacifications R > L and BNP elevated > 4900.     Intubated and mechanically ventilated, extubated to NC 10/13, sating well  Chest x-ray concerning for pneumonia vs ARDS    · Weaning off O2; currently on 2L NC with minimal subjective dyspnea at rest. Trial of RA on 10/20 was unsuccessful, consider retrying again today.  · Remdesivir completed. Dexamethasone 6 mg daily  · CAP coverage with Vanc/Zosyn completed   · HD for volume clearance per Nephro schedule  · Duoneb nebulizer, tiotropium and Fluticasone furoate / Vilanterol

## 2023-10-22 NOTE — PROGRESS NOTES
[facility] - [facility]  Critical Care Medicine  Progress Note    Patient Name: Cosme Lewis  MRN: [id number]  Admission Date: 10/11/2023  Hospital Length of Stay: 11 days  Code Status: Full Code  Attending Provider: Ricky Vasquez MD  Primary Care Provider: Eliecer Ohara III, MD   Principal Problem: Acute on chronic respiratory failure with hypoxia and hypercapnia    Subjective:     HPI:  Mr. Lewis is a 58 year old male with PMH notable for COPD, HFrEF (45% and DD), ESRD on dialysis, HTN, DM presents via EMS from nursing home for altered mental status and hypoxia.  Per EMS, patient was found to have glucose of 38, he received D50 with improvement in his glucose.  He also had SpO2 58% which improved with 15 L nasal cannula.     ED work up revealed VBG 7.27/61 and he was subsequently placed on bipap for work of breathing. He was persistently hypoglycemic with BG 59 --> 34 despite IV dextrose, therefore he was started on a continuous dextrose infusion. Other lab work up revealed WBC 3, stable, H//H 9.5/29, Na 143, CO2 20, Cr/BUN 3.8/38, elevated AST/ALT/alk phos, and troponin 0.174 with no ischemic changes on EKG. He was given IV rocephin and doxycyline. Chest XR with bilateral opacifications R > L.    Critical care consulted for acute hypoxemic respiratory failure and NIPPV      Hospital/ICU Course:  In the MICU, the patient arrived on bipap but was found to still be hypoxic which initially improved after adjusting his bipap settings. He continued to be altered and tried occasionally became combative requiring precedex which was affective when maxed out at 1.4 mcg/kg/hour. During this time he was also found to have worsening hypoglycemia which required continuous D10 administration at a max rate of 200 cc/hour. Eventually his oxygen status started declining and the decision was made to intubate the patient. It is believed that worsening of his oxygen status was in part due the large volume of D10 he received to  maintain his blood sugars. For this reason, it was decided that D20 at a slower rate would be more appropriate, and for this a central line was placed. Additionally, he received a round of hemodialysis. He is receiving remdesivir for COVID and vacomycin/zosyn/doxycycline for community acquired pneumonia coverage/concerning chest x-ray. The patient's sugars started rising on the D20 and eventually he started becoming hyperglycemic  to the 220s which led to the D20 being stopped for the time being and since that time the sugars have been relatively normoglycemic. His abdomen was found to be slightly more distended but KUB was negative for bowel obstruction, likely suggesting ascites as the source.    On 10/12 after passing SAT and SBT, the decision was made to extubate the patient which was done successfully. All sedatives were turned off at this time as well. He was placed on high flow at the time and has been sating well. He continued to receive antibiotics as well as antivirals for CAP coverage/COVID respectively which was able to help improve his likely pneumonia vs ARDS.     Between 10/14 - 10/17 he went back and forth between requiring high flow and bipap, however starting on 10/17 he was able to tolerate nasal canula.    Between 10/18 - 10/22 his nasal canula was slowly weaned down to around 3L, sometimes requiring more. This oxygen level requirement may represent a baseline. Additionally during this time, his blood sugars remained labile and difficult to manage, as overcorrection occurred multiple times. For this reason, endocrinology was consulted for assistance in creating a blood sugar regime.           Interval History/Significant Events:       Objective:     Vital Signs (Most Recent):  Temp: 97.5 °F (36.4 °C) (10/22/23 1500)  Pulse: 72 (10/22/23 1548)  Resp: (!) 29 (10/22/23 1548)  BP: (!) 148/72 (10/22/23 1500)  SpO2: (!) 92 % (10/22/23 1548) Vital Signs (24h Range):  Temp:  [97.3 °F (36.3 °C)-98.1 °F  (36.7 °C)] 97.5 °F (36.4 °C)  Pulse:  [67-78] 72  Resp:  [13-31] 29  SpO2:  [84 %-100 %] 92 %  BP: (137-169)/(70-96) 148/72   Weight: 70.2 kg (154 lb 12.2 oz)  Body mass index is 22.85 kg/m².      Intake/Output Summary (Last 24 hours) at 10/22/2023 1615  Last data filed at 10/22/2023 0600  Gross per 24 hour   Intake 24.93 ml   Output --   Net 24.93 ml          Physical Exam  Vitals and nursing note reviewed.   Constitutional:       General: He is not in acute distress.     Appearance: He is ill-appearing.      Comments: Increased responsiveness and able to communicate/formulate coherent words   HENT:      Head: Normocephalic and atraumatic.      Right Ear: External ear normal.      Left Ear: External ear normal.      Nose: Nose normal.   Eyes:      General:         Right eye: No discharge.         Left eye: No discharge.      Pupils: Pupils are equal, round, and reactive to light.   Cardiovascular:      Rate and Rhythm: Normal rate and regular rhythm.   Pulmonary:      Effort: Pulmonary effort is normal.      Comments: Improved breathing status, minimal crackles auscultated in the lung fields   Abdominal:      General: There is distension.      Tenderness: There is no abdominal tenderness.      Comments: Some degree of distension still present, however improved from previous days   Musculoskeletal:         General: No swelling or deformity.   Skin:     General: Skin is warm.      Coloration: Skin is not jaundiced.   Psychiatric:         Mood and Affect: Mood normal.         Behavior: Behavior normal.            Vents:  Vent Mode: Spont (10/12/23 1503)  Set Rate: 20 BPM (10/12/23 1141)  Vt Set: 380 mL (10/12/23 1141)  Pressure Support: 5 cmH20 (10/12/23 1503)  PEEP/CPAP: 5 cmH20 (10/12/23 1503)  Oxygen Concentration (%): 28 (10/22/23 1548)  Peak Airway Pressure: 11 cmH20 (10/12/23 1503)  Plateau Pressure: 20 cmH20 (10/12/23 1503)  Total Ve: 12.8 L/m (10/12/23 1503)  Negative Inspiratory Force (cm H2O): 0 (10/12/23  "1503)  F/VT Ratio<105 (RSBI): (!) 97.83 (10/12/23 1503)  Lines/Drains/Airways       Peripheral Intravenous Line  Duration                  Hemodialysis AV Fistula Left upper arm -- days         Midline Catheter Insertion/Assessment  - Single Lumen 10/22/23 1520 Right brachial vein 20g x 10cm <1 day                  Significant Labs:    CBC/Anemia Profile:  Recent Labs   Lab 10/21/23  0334 10/22/23  0319   WBC 4.22 5.10   HGB 7.8* 8.2*   HCT 24.2* 25.8*   PLT 85* 128*   MCV 90 91   RDW 18.5* 18.4*        Chemistries:  Recent Labs   Lab 10/21/23  0334 10/22/23  0319    137   K 4.1 4.8    101   CO2 23 23   BUN 40* 53*   CREATININE 2.6* 3.5*   CALCIUM 7.5* 7.7*   ALBUMIN 2.2* 2.2*   PROT 5.4* 5.8*   BILITOT 1.0 1.0   ALKPHOS 137* 151*   ALT 25 23   AST 27 24   MG 1.9 2.0   PHOS 2.9 4.0       All pertinent labs within the past 24 hours have been reviewed.    Significant Imaging:  I have reviewed all pertinent imaging results/findings within the past 24 hours.      ABG  No results for input(s): "PH", "PO2", "PCO2", "HCO3", "BE" in the last 168 hours.  Assessment/Plan:     Pulmonary  * Acute on chronic respiratory failure with hypoxia and hypercapnia  Patient with Hypercapnic and Hypoxic Respiratory failure which is Chronic.  he is not on home oxygen. Supplemental oxygen was provided and noted-  Signs/symptoms of respiratory failure include- tachypnea, increased work of breathing and use of accessory muscles. Contributing diagnoses includes - CHF, COPD and fluid volume excess Labs and images were reviewed. Patient Has recent ABG, which has been reviewed. Will treat underlying causes and adjust management of respiratory failure as follows-     58 year old male with multiple medical problems who presented to [facility] ED via EMS for AMS, hypoglycemia, and hypoxia placed on NIPPV. SpO2 in 50s on arrival which improved with HFNC, however, he remained tachypnic with accessory muscle use, therefore he was placed on NIPPV. " CXR with bilateral opacifications R > L and BNP elevated > 4900.     Intubated and mechanically ventilated, extubated to NC 10/13, sating well  Chest x-ray concerning for pneumonia vs ARDS    · Weaning off O2; currently on 2L NC with minimal subjective dyspnea at rest. Trial of RA on 10/20 was unsuccessful, consider retrying again today.  · Remdesivir completed. Dexamethasone 6 mg daily  · CAP coverage with Vanc/Zosyn completed   · HD for volume clearance per Nephro schedule  · Duoneb nebulizer, tiotropium and Fluticasone furoate / Vilanterol       Acute respiratory failure with hypoxia and hypercapnia  Patient with Hypoxic Respiratory failure which is Acute.  he is not on home oxygen. Supplemental oxygen was provided and noted.  Signs/symptoms of respiratory failure include- respiratory distress. Contributing diagnoses includes - ARDS Labs and images were reviewed. Patient Has recent ABG, which has been reviewed. Will treat underlying causes and adjust management of respiratory failure as follows- continuing antibiotics and antivirals, adjustments of mechanical ventilation, HD and fluid regulation.     Extubated to 5L NC morning of 10/13. In no acute distress  VBG 7.2552  However mental status improving    -continue to monitor on NC  -ABG as warranted  -Currently on nasal canula    COPD (chronic obstructive pulmonary disease)  --Duo nebs Q4wake  --Currently on nasal canula 2L  --Fluticasone furoate / Vilanterol and Salmeterol     Cardiac/Vascular  HLD (hyperlipidemia)  -- LFT's have improved, atorvastatin 40 mg daily      Chronic diastolic heart failure  ECHO 09/23:       Left Ventricle: The left ventricle is normal in size. Increased wall thickness. There is concentric hypertrophy. Mild global hypokinesis present. There is mildly reduced systolic function with a visually estimated ejection fraction of 40 - 45%. There is indeterminate diastolic function.    Right Ventricle: Normal right ventricular cavity size.  Right ventricle wall motion  is normal. Systolic function is borderline low.    Left Atrium: Left atrium is severely dilated.    Tricuspid Valve: There is mild regurgitation.    Pericardium: There is a trivial effusion. Echodensity seen adjacent to the visceral pericardium of uncertain significance. This may be pericardial fat although other etiologies not excluded. Correlate clinically.    PASP is at least 46 mmHg.    BNP > 4900    --Volume removal per iHD. Appreciate nephrology assistance   --Blood pressure started becoming elevated on 10/17. Home nifedipine and lisinopril restarted as pressures have been within normal limits. Hydralazine pushes prn should pressures rise.     Renal/  ESRD (end stage renal disease)  Outpatient HD [facility]: [facility]   HD tx days: MWF   HD tx time: 4hrs   HD access: LUE AVF   HD modality: iHD   Residual urine: Anuric   Estimated Creatinine Clearance: 33.8 mL/min (A) (based on SCr of 2.3 mg/dL (H)).    Toelrating iHD well    -- Nephrology consulted for iHD management. Appreciate their assistance   -- CMP daily and trend   -- Avoid nephrotoxins   -- Renally dose all medications to appropriate GFR / CrCl   -- Hgb > 7 and MAP > 65 goals     Hematology  History of pulmonary embolism  Diagnosed 10/2022  Repeat CT 3/2023 with resolution    -heparin stopped as patient's platelet level fell to 35,000. Has since recovered so home eliquis restarted    Oncology  Anemia in ESRD (end-stage renal disease)  Recent Labs   Lab 10/22/23  0319   WBC 5.10   RBC 2.83*   HGB 8.2*   HCT 25.8*   *   MCV 91   MCH 29.0   MCHC 31.8*     · CBC daily and trend   · Transfuse for hgb < 7   · Receives EPO with iHD    Endocrine  Hyperglycemia due to type 2 diabetes mellitus  - Continues to have hyperglycemic episodes. Detemir was added on 10/21, however overcorrected today causing hypoglycemia.  - Endocrinology consult placed for assistance with creating an insulin/blood sugar regimen        Palliative Care  Advance care planning  Palliative care consulted for outpatient sx mgmt recs; appreciate assist       Critical Care Daily Checklist:    A: Awake: RASS Goal/Actual Goal: RASS Goal: 0-->alert and calm  Actual:     B: Spontaneous Breathing Trial Performed? Spon. Breathing Trial Initiated?: Not initiated (10/12/23 0742)   C: SAT & SBT Coordinated?  Passed                      D: Delirium: CAM-ICU Overall CAM-ICU: Negative   E: Early Mobility Performed? No   F: Feeding Goal: Goals: Meet % EEN, EPN by RD f/u date  Status: Nutrition Goal Status: goal met   Current Diet Order   Procedures    Diet diabetic Minced & Moist (IDDSI Level 5); 2000 Calorie     Order Specific Question:   Additional Diet Options:     Answer:   Minced & Moist (IDDSI Level 5)     Order Specific Question:   Total calories:     Answer:   2000 Calorie      AS: Analgesia/Sedation None   T: Thromboembolic Prophylaxis Eliquis    H: HOB > 300 Yes   U: Stress Ulcer Prophylaxis (if needed) No   G: Glucose Control SSI, detemir   B: Bowel Function Stool Occurrence: 1   I: Indwelling Catheter (Lines & Dutta) Necessity PIVs   D: De-escalation of Antimicrobials/Pharmacotherapies Off all antibiotics/antivirals    Plan for the day/ETD Wean oxygen, blood sugar control    Code Status:  Family/Goals of Care: Full Code          Critical care was time spent personally by me on the following activities: development of treatment plan with patient or surrogate and bedside caregivers, discussions with consultants, evaluation of patient's response to treatment, examination of patient, ordering and performing treatments and interventions, ordering and review of laboratory studies, ordering and review of radiographic studies, pulse oximetry, re-evaluation of patient's condition. This critical care time did not overlap with that of any other provider or involve time for any procedures.     Robbie Fields MD  Critical Care Medicine  [facility] -  Cardiac Medical ICU

## 2023-10-22 NOTE — PLAN OF CARE
MICU DAILY GOALS     Family/Goals of care/Code Status   Code Status: Full Code    24H Vital Sign Range  Temp:  [97.3 °F (36.3 °C)-98.1 °F (36.7 °C)]   Pulse:  [67-78]   Resp:  [13-31]   BP: (143-169)/(69-96)   SpO2:  [84 %-100 %]      Shift Events   No acute events throughout shift    AWAKE RASS: Goal - RASS Goal: 0-->alert and calm  Actual - RASS (Osorio Agitation-Sedation Scale): alert and calm    Restraint necessity: Not necessary   BREATHE SBT: not intubated   Coordinate A & B, analgesics/sedatives Pain: managed   SAT: Not intubated   Delirium CAM-ICU: Overall CAM-ICU: Negative   Early(intubated/ Progressive (non-intubated) Mobility MOVE Screen (INTUBATED ONLY): Not intubated    Activity: Activity Management: Rolling - L1   Feeding/Nutrition Diet order: Diet/Nutrition Received: mechanical/dental soft, consistent carb/diabetic diet, Specialty Diet/Nutrition Received: renal diet   Thrombus DVT prophylaxis: VTE Required Core Measure: Pharmacological prophylaxis initiated/maintained   HOB Elevation Head of Bed (HOB) Positioning: HOB at 30-45 degrees   Ulcer Prophylaxis GI: yes   Glucose control managed Glycemic Management: blood glucose monitored   Skin Skin assessed during: Q Shift Change    Sacrum intact? Yes  Heels intact? Yes  Surgical wound? No    [] No Altered Skin Integrity Present    []Prevention Measures Documented    [] Altered Skin Integrity Present or Discovered   [] LDA present /added in EPIC   [] Wound Image Taken     Wound Care Consulted? No    Attending Nurse:  Sandy Lagunas RN/Staff Member:  Rosio Santana RN   Bowel Function diarrhea    Indwelling Catheter Necessity      [REMOVED] Percutaneous Central Line Insertion/Assessment - Triple Lumen  10/11/23 1501 Internal Jugular Left-Line Necessity Review: Hemodynamic instability  na   De-escalation Antibiotics na       VS and assessment per flow sheet, patient progressing towards goals as tolerated, plan of care reviewed with [unfilled] and  family, all concerns addressed, will continue to monitor.

## 2023-10-22 NOTE — ASSESSMENT & PLAN NOTE
Recent Labs   Lab 10/22/23  0319   WBC 5.10   RBC 2.83*   HGB 8.2*   HCT 25.8*   *   MCV 91   MCH 29.0   MCHC 31.8*     · CBC daily and trend   · Transfuse for hgb < 7   · Receives EPO with iHD

## 2023-10-22 NOTE — PLAN OF CARE
Problem: Diabetes Comorbidity  Goal: Blood Glucose Level Within Targeted Range  Outcome: Ongoing, Progressing     Problem: Device-Related Complication Risk (Hemodialysis)  Goal: Safe, Effective Therapy Delivery  Outcome: Ongoing, Progressing     Problem: Fall Injury Risk  Goal: Absence of Fall and Fall-Related Injury  Outcome: Ongoing, Progressing   ..  MICU DAILY GOALS     Family/Goals of care/Code Status   Code Status: Full Code    24H Vital Sign Range  Temp:  [97.3 °F (36.3 °C)-98.6 °F (37 °C)]   Pulse:  [68-76]   Resp:  [16-31]   BP: (136-169)/(69-87)   SpO2:  [88 %-99 %]      Shift Events   Patient blood glucose dropped to 39, patient given D10 250 and glucose tablets. Blood sugar improved to 89.     AWAKE RASS: Goal - RASS Goal: 0-->alert and calm  Actual - RASS (Osorio Agitation-Sedation Scale): alert and calm    Restraint necessity: Not necessary   BREATHE SBT: not intubated   Coordinate A & B, analgesics/sedatives Pain: managed   SAT: Not intubated   Delirium CAM-ICU: Overall CAM-ICU: Negative   Early(intubated/ Progressive (non-intubated) Mobility MOVE Screen (INTUBATED ONLY): Not intubated    Activity: Activity Management: Rolling - L1   Feeding/Nutrition Diet order: Diet/Nutrition Received: consistent carb/diabetic diet, mechanical/dental soft, Specialty Diet/Nutrition Received: renal diet   Thrombus DVT prophylaxis: VTE Required Core Measure: Pharmacological prophylaxis initiated/maintained   HOB Elevation Head of Bed (HOB) Positioning: HOB at 20-30 degrees   Ulcer Prophylaxis GI: no   Glucose control managed Glycemic Management: blood glucose monitored   Skin Skin assessed during: Q Shift Change    Sacrum intact? No  Heels intact? Yes  Surgical wound? No    [] No Altered Skin Integrity Present    []Prevention Measures Documented    [] Altered Skin Integrity Present or Discovered   [] LDA present /added in EPIC   [] Wound Image Taken     Wound Care Consulted? No    Attending Nurse:  Nixon      Second RN/Staff Member:  Rosio Santana RN   Bowel Function no issues    Indwelling Catheter Necessity      [REMOVED] Percutaneous Central Line Insertion/Assessment - Triple Lumen  10/11/23 1501 Internal Jugular Left-Line Necessity Review: Hemodynamic instability     De-escalation Antibiotics No       VS and assessment per flow sheet, patient progressing towards goals as tolerated, plan of care reviewed with [unfilled] and family, all concerns addressed, will continue to monitor.

## 2023-10-22 NOTE — ASSESSMENT & PLAN NOTE
ESRD on iHD MWF  [facility]  4 hours  EDW:  71 kg  UMA AVF    -Bedside Hd completed 10/20 , net uf 3 liters removed,  Plan/Recommendations:  -will continue to monitor renal function and oxygen requirements are coming down, electrolyte non emergent,  will access further need for dialysis tomorrow  -continue strict I/O's  -RFP daily  -renal diet when advanced, 1L fluid restrictions  -Phos WNL, no need for binders at this time

## 2023-10-22 NOTE — SUBJECTIVE & OBJECTIVE
Interval History:   NAEON.  Patient seen and examined , No any active complaints  Review of patient's allergies indicates:   Allergen Reactions    Heparin analogues      HIT KARLOS weakly positive, ELISE pending     Current Facility-Administered Medications   Medication Frequency    acetaminophen tablet 650 mg Q6H PRN    apixaban tablet 2.5 mg BID    atorvastatin tablet 40 mg Daily    carvediloL tablet 12.5 mg BID WM    dextrose 10% bolus 125 mL 125 mL PRN    dextrose 10% bolus 250 mL 250 mL PRN    epoetin xavier-epbx injection 3,510 Units Every Mon, Wed, Fri    FLUoxetine capsule 40 mg Daily    fluticasone furoate-vilanteroL 100-25 mcg/dose diskus inhaler 1 puff Daily    glucagon (human recombinant) injection 1 mg PRN    glucose chewable tablet 16 g PRN    glucose chewable tablet 24 g PRN    hydrALAZINE injection 10 mg Q6H PRN    insulin aspart U-100 pen 0-10 Units QID (AC + HS) PRN    multivitamin tablet Daily    NIFEdipine 24 hr tablet 60 mg BID    sodium chloride 0.9% bolus 250 mL 250 mL PRN    tiotropium bromide 2.5 mcg/actuation inhaler 2 puff Daily       Objective:     Vital Signs (Most Recent):  Temp: 97.6 °F (36.4 °C) (10/22/23 1100)  Pulse: 73 (10/22/23 1200)  Resp: 14 (10/22/23 1200)  BP: (!) 163/96 (10/22/23 0800)  SpO2: (!) 91 % (10/22/23 1200) Vital Signs (24h Range):  Temp:  [97.3 °F (36.3 °C)-98.2 °F (36.8 °C)] 97.6 °F (36.4 °C)  Pulse:  [68-78] 73  Resp:  [13-31] 14  SpO2:  [84 %-100 %] 91 %  BP: (137-169)/(70-96) 163/96     Weight: 70.2 kg (154 lb 12.2 oz) (10/18/23 1100)  Body mass index is 22.85 kg/m².  Body surface area is 1.85 meters squared.    I/O last 3 completed shifts:  In: 24.9 [IV Piggyback:24.9]  Out: -      Physical Exam  Vitals and nursing note reviewed.   Constitutional:       General: He is not in acute distress.     Appearance: He is well-developed. He is ill-appearing.      Interventions: Nasal cannula in place.   HENT:      Head: Normocephalic and atraumatic.      Nose: No congestion  or rhinorrhea.   Eyes:      General: No scleral icterus.        Right eye: No discharge.         Left eye: No discharge.      Extraocular Movements: Extraocular movements intact.      Conjunctiva/sclera: Conjunctivae normal.   Neck:      Vascular: JVD present.   Cardiovascular:      Rate and Rhythm: Normal rate and regular rhythm.      Heart sounds: No murmur heard.     No friction rub. No gallop.      Comments: UMA AVF  Pulmonary:      Effort: No respiratory distress.      Breath sounds: Rhonchi and rales present. No wheezing.   Abdominal:      General: There is no distension.      Palpations: Abdomen is soft.      Tenderness: There is no abdominal tenderness.   Musculoskeletal:         General: No swelling.      Cervical back: Normal range of motion.      Right lower leg: Edema present.      Left lower leg: Edema present.   Skin:     General: Skin is warm and dry.   Neurological:      Mental Status: He is alert. Mental status is at baseline.          Significant Labs:  CBC:   Recent Labs   Lab 10/22/23  0319   WBC 5.10   RBC 2.83*   HGB 8.2*   HCT 25.8*   *   MCV 91   MCH 29.0   MCHC 31.8*       CMP:   Recent Labs   Lab 10/22/23  0319   GLU 64*   CALCIUM 7.7*   ALBUMIN 2.2*   PROT 5.8*      K 4.8   CO2 23      BUN 53*   CREATININE 3.5*   ALKPHOS 151*   ALT 23   AST 24   BILITOT 1.0

## 2023-10-22 NOTE — PLAN OF CARE
"Endocrinology Plan of Care    Cosme Lewis is a 58 y.o. male with PMH significant for T2DM with long term use of insulin and ESRD from [facility] who was brought in to the hospital on 10/11/2023 for evaluation of shortness of breath. Patient was diagnosed and treated for COVID pneumonia and sepsis. On admission patient was hypoglycemic felt to be secondary to administration of insulin at the nursing home. Hospitalization has been further complicated by acute encephalopathy.    Since admission patient has been treated with various steroids, levemir and novolog with persistent hypoglycemia. Endocrinology is consulted for "Labile BG, request recs for new home regimen"          Regarding T2DM  LOV with NP Snow Calle 07/2023  Diagnosed with DM around 4 years ago    Outpatient insulin regimen:  Levemir 8 units twice daily   Humalog 8 units plus correction scale before meals   Add correction scale as needed.  Blood sugar 201 to 250 add 2 units  Blood sugar 251 to 300 add 4 units  Blood sugar 301 to 350 add 6 units  Blood sugar greater than 350 add 8 units      Assessment/Plan     Hypoglycemia  Type 2 diabetes mellitus with hyperglycemia, with long-term current use of insulin    Lab Results   Component Value Date    HGBA1C 8.4 (H) 10/12/2023    HGBA1C 10.7 (H) 07/18/2023    GLU 64 (L) 10/22/2023    GLU 66 (L) 10/21/2023     (H) 10/20/2023     (H) 10/19/2023     (H) 10/18/2023    CPEPTIDE <0.08 (L) 10/11/2023    CPEPTIDE 0.09 (L) 01/05/2023    CPEPTIDE 0.09 (L) 01/05/2023    BHYDRXBUT 0.0 10/11/2023    BHYDRXBUT 0.0 09/12/2023    CREATININE 3.5 (H) 10/22/2023    CREATININE 2.6 (H) 10/21/2023       Previous hypoglycemia workup was done on 10/11/2023  C peptide was undetectable, BHB was 0 and random insulin was elevated, however patient had presumably received his nighttime Levemir at the nursing home the night prior to arrival.    - Recommend checking glucose Q1-2Hr, if POCT glucose is <55, " please repeat POCT glucose in a different area of the patient body (ear lobe, upper arm, forearm).  - If serum glucose less than 55 on 2 simultaneous POCT on two different sources (ear lobe, upper arm, forearm) or labs, Check STAT labs prior to treating with dextrose if patient is stable  (If patient is obtunded or hemodynamically unstable, do not wait to treat hypoglycemia in order to draw labs.). The administration of glucose may decrease the accuracy of hypoglycemia workup    Otherwise,  - Obtain: STAT C-peptide, random Insulin, Proinsulin, Beta-Hydroxybutyrate, renal function panel, and Sulfonylurea screen    After lab is obtained  Treatment of hypoglycemia  1) Treat hypoglycemia with IV Dextrose bolus per protocol  2) If severe hypoglycemia persists, recommend giving subcutaneous or intramuscular injection of glucagon (1 mg) order 1x, continue to monitor glucose per hypoglycemia protocol  3) Continued hypoglycemia with above treatment requires glucose infusion; notify primary team to start D5 1/2 Normal Saline vs D10 drip      ESRD on HD  - Continued management per nephrology      Thank you for allowing me to participate in the care of this patient.   Please do not hesitate to call me with any questions or concerns.    Nikko Gallardo DO  [facility] Department of Endocrinology    Office: [phone]  Fax: [phone]    The above history labs imaging impression and plan were discussed with attending physician who is in agreement and also took part in this patient's care.  I personally reviewed all of the patients available medications, labs, imaging, vitals, allergies, medical history.

## 2023-10-22 NOTE — ASSESSMENT & PLAN NOTE
- Continues to have hyperglycemic episodes. Detemir was added on 10/21, however overcorrected today causing hypoglycemia.  - Endocrinology consult placed for assistance with creating an insulin/blood sugar regimen

## 2023-10-23 PROBLEM — E11.29 TYPE 2 DIABETES MELLITUS WITH RENAL COMPLICATION: Status: ACTIVE | Noted: 2023-01-01

## 2023-10-23 NOTE — PLAN OF CARE
Problem: Diabetes Comorbidity  Goal: Blood Glucose Level Within Targeted Range  Outcome: Ongoing, Progressing     Problem: Fall Injury Risk  Goal: Absence of Fall and Fall-Related Injury  Outcome: Ongoing, Progressing     Problem: Coping Ineffective  Goal: Effective Coping  Outcome: Ongoing, Progressing   ..  MICU DAILY GOALS     Family/Goals of care/Code Status   Code Status: Full Code    24H Vital Sign Range  Temp:  [97.5 °F (36.4 °C)-97.8 °F (36.6 °C)]   Pulse:  [67-79]   Resp:  [13-35]   BP: (115-169)/(68-96)   SpO2:  [84 %-100 %]      Shift Events   Patient had a few nose bleeds throughout the night. MD aware. Patient switched between high flow nasal cannula, BiPAP and Ventrui mask. PRN medication given for sleep    AWAKE RASS: Goal - RASS Goal: 0-->alert and calm  Actual - RASS (Osorio Agitation-Sedation Scale): alert and calm    Restraint necessity: Not necessary   BREATHE SBT: Not intubated    Coordinate A & B, analgesics/sedatives Pain: managed   SAT: Not intubated   Delirium CAM-ICU: Overall CAM-ICU: Negative   Early(intubated/ Progressive (non-intubated) Mobility MOVE Screen (INTUBATED ONLY): Not intubated    Activity: Activity Management: Rolling - L1   Feeding/Nutrition Diet order: Diet/Nutrition Received: mechanical/dental soft, Specialty Diet/Nutrition Received: renal diet   Thrombus DVT prophylaxis: VTE Required Core Measure: Pharmacological prophylaxis initiated/maintained   HOB Elevation Head of Bed (HOB) Positioning: HOB at 45 degrees   Ulcer Prophylaxis GI: no   Glucose control managed Glycemic Management: blood glucose monitored   Skin Skin assessed during: Daily Assessment    Sacrum intact? No  Heels intact? Yes  Surgical wound? No    [] No Altered Skin Integrity Present    []Prevention Measures Documented    [] Altered Skin Integrity Present or Discovered   [] LDA present /added in EPIC   [] Wound Image Taken     Wound Care Consulted? No    Attending Nurse:  Nixon Lagunas RN/Staff  Member:  Rosio Santana, RN   Bowel Function no issues    Indwelling Catheter Necessity      [REMOVED] Percutaneous Central Line Insertion/Assessment - Triple Lumen  10/11/23 1501 Internal Jugular Left-Line Necessity Review: Hemodynamic instability     De-escalation Antibiotics No       VS and assessment per flow sheet, patient progressing towards goals as tolerated, plan of care reviewed with  Cosme Lewis, all concerns addressed, will continue to monitor.

## 2023-10-23 NOTE — SUBJECTIVE & OBJECTIVE
Interval HPI:   Overnight events: NAEON. BG 70s-80s this AM. Last low was 10/22 around 3am.  Eatin%  Nausea: No  Hypoglycemia and intervention: No, not in last 24h  Fever: No  TPN and/or TF: No  If yes, type of TF/TPN and rate: n/a    PMH, PSH, FH, SH updated and reviewed     ROS:  Review of Systems   Unable to perform ROS: Acuity of condition       Current Medications and/or Treatments Impacting Glycemic Control  Immunotherapy:    Immunosuppressants       None          Steroids:   Hormones (From admission, onward)      Start     Stop Route Frequency Ordered    10/23/23 0118  melatonin tablet 9 mg         -- Oral Nightly PRN 10/23/23 0018          Pressors:    Autonomic Drugs (From admission, onward)      None          Hyperglycemia/Diabetes Medications:   Antihyperglycemics (From admission, onward)      Start     Stop Route Frequency Ordered    10/23/23 1118  insulin aspart U-100 pen 0-5 Units         -- SubQ Before meals & nightly PRN 10/23/23 1118             PHYSICAL EXAMINATION:  Vitals:    10/23/23 1700   BP: 109/66   Pulse: 76   Resp: (!) 26   Temp:      Body mass index is 22.85 kg/m².     Physical Exam  Vitals and nursing note reviewed.   Constitutional:       General: He is not in acute distress.     Appearance: He is ill-appearing.   HENT:      Head: Normocephalic and atraumatic.      Mouth/Throat:      Mouth: Mucous membranes are moist.   Eyes:      Conjunctiva/sclera: Conjunctivae normal.   Cardiovascular:      Rate and Rhythm: Normal rate.   Pulmonary:      Effort: No respiratory distress.      Comments: Slight increase in wob but overall comfortable with o2 by NC in place  Abdominal:      General: There is no distension.      Palpations: Abdomen is soft.      Tenderness: There is no abdominal tenderness.   Musculoskeletal:      Cervical back: Normal range of motion and neck supple.      Right lower leg: No edema.      Left lower leg: No edema.   Skin:     General: Skin is warm and dry.    Neurological:      Mental Status: He is alert.      Comments: Oriented to person and place but not day of the week or year    Psychiatric:         Mood and Affect: Mood normal.         Behavior: Behavior normal.           No

## 2023-10-23 NOTE — CONSULTS
"[facility] - Cardiac Medical ICU  Endocrinology  Diabetes Consult Note    Consult Requested by: Marquis Fernández MD   Reason for admit: Acute on chronic respiratory failure with hypoxia and hypercapnia    HISTORY OF PRESENT ILLNESS:  Cosme Lewis is a 57yo M with T2DM (on insulin at home), ESRD on HD, COPD, HFrEF, and HTN from [facility] who was brought in to the hospital on 10/11/2023 for evaluation of shortness of breath. Patient was diagnosed and treated for COVID pneumonia and sepsis. On admission patient was hypoglycemic felt to be secondary to administration of insulin at the nursing home. Hospitalization has been further complicated by acute encephalopathy.     Since admission patient has been treated with various steroids, levemir and novolog with some hypoglycemia. He received 10 days of high dose dexamethasone for covid. Last dose of dexamethasone was on 10/21. At time of endocrinology consult pt was getting Novolog moderate dose correction scale. Levemir was discontinued several days ago 2/2 hypoglycemia. Endocrinology is consulted for "Labile BG, request recs for new home regimen"    Since discontinuing levemir BG has been >70, although this AM 70s-80s. Getting a significant amount of SSI due to moderate dose correction being ordered instead of low dose in this pt with ESRD.             Regarding T2DM  LOV with NP Snow Calle 2023  Diagnosed with DM around 4 years ago     Outpatient insulin regimen:  Levemir 8 units twice daily   Humalog 8 units plus correction scale before meals   Add correction scale as needed.  Blood sugar 201 to 250 add 2 units  Blood sugar 251 to 300 add 4 units  Blood sugar 301 to 350 add 6 units  Blood sugar greater than 350 add 8 units         Interval HPI:   Overnight events: NAEON. BG 70s-80s this AM. Last low was 10/22 around 3am.  Eatin%  Nausea: No  Hypoglycemia and intervention: No, not in last 24h  Fever: No  TPN and/or TF: No  If yes, type of TF/TPN and " rate: n/a    PMH, PSH, FH, SH updated and reviewed     ROS:  Review of Systems   Unable to perform ROS: Acuity of condition       Current Medications and/or Treatments Impacting Glycemic Control  Immunotherapy:    Immunosuppressants       None          Steroids:   Hormones (From admission, onward)      Start     Stop Route Frequency Ordered    10/23/23 0118  melatonin tablet 9 mg         -- Oral Nightly PRN 10/23/23 0018          Pressors:    Autonomic Drugs (From admission, onward)      None          Hyperglycemia/Diabetes Medications:   Antihyperglycemics (From admission, onward)      Start     Stop Route Frequency Ordered    10/23/23 1118  insulin aspart U-100 pen 0-5 Units         -- SubQ Before meals & nightly PRN 10/23/23 1118             PHYSICAL EXAMINATION:  Vitals:    10/23/23 1700   BP: 109/66   Pulse: 76   Resp: (!) 26   Temp:      Body mass index is 22.85 kg/m².     Physical Exam  Vitals and nursing note reviewed.   Constitutional:       General: He is not in acute distress.     Appearance: He is ill-appearing.   HENT:      Head: Normocephalic and atraumatic.      Mouth/Throat:      Mouth: Mucous membranes are moist.   Eyes:      Conjunctiva/sclera: Conjunctivae normal.   Cardiovascular:      Rate and Rhythm: Normal rate.   Pulmonary:      Effort: No respiratory distress.      Comments: Slight increase in wob but overall comfortable with o2 by NC in place  Abdominal:      General: There is no distension.      Palpations: Abdomen is soft.      Tenderness: There is no abdominal tenderness.   Musculoskeletal:      Cervical back: Normal range of motion and neck supple.      Right lower leg: No edema.      Left lower leg: No edema.   Skin:     General: Skin is warm and dry.   Neurological:      Mental Status: He is alert.      Comments: Oriented to person and place but not day of the week or year    Psychiatric:         Mood and Affect: Mood normal.         Behavior: Behavior normal.              Labs  "Reviewed and Include   Recent Labs   Lab 10/23/23  0314   GLU 89   CALCIUM 7.2*   ALBUMIN 2.1*   PROT 5.4*   *   K 4.8   CO2 20*      BUN 60*   CREATININE 3.9*   ALKPHOS 148*   ALT 22   AST 20   BILITOT 0.9     Lab Results   Component Value Date    WBC 4.81 10/23/2023    HGB 7.5 (L) 10/23/2023    HCT 23.6 (L) 10/23/2023    MCV 92 10/23/2023     (L) 10/23/2023     No results for input(s): "TSH", "FREET4" in the last 168 hours.  Lab Results   Component Value Date    HGBA1C 8.4 (H) 10/12/2023       Nutritional status:   Body mass index is 22.85 kg/m².  Lab Results   Component Value Date    ALBUMIN 2.1 (L) 10/23/2023    ALBUMIN 2.2 (L) 10/22/2023    ALBUMIN 2.2 (L) 10/21/2023     No results found for: "PREALBUMIN"    Estimated Creatinine Clearance: 20 mL/min (A) (based on SCr of 3.9 mg/dL (H)).    Accu-Checks  Recent Labs     10/22/23  0551 10/22/23  0825 10/22/23  1323 10/22/23  1744 10/22/23  2134 10/23/23  0036 10/23/23  0619 10/23/23  0807 10/23/23  1154 10/23/23  1646   POCTGLUCOSE 89 189* 267* 273* 181* 136* 85 78 132* 252*        ASSESSMENT and PLAN    Pulmonary  * Acute on chronic respiratory failure with hypoxia and hypercapnia  Still requiring supplemental O2  COVID dx on admission -- was on high dose steroids which was increasing insulin requirement, but last dose of dex was 10/21    Renal/  ESRD (end stage renal disease)  ESRD on HD  Cautious with insulin given decreased renal clearance as this is likely the reason for hypoglycemia.    Endocrine  Type 2 diabetes mellitus with renal complication  Endocrinology consulted for BG management.   Inpatient BG goal 140-180    - Novolog (Insulin Aspart) low dose SSI prn for now (150/50)  - He may need low dose levemir added back but for now would monitor on LDC only given severe hypoglycemia earlier this admission   - BG checks AC/HS/0200 or q4h if not eating meals   - Hypoglycemia protocol in place  - If blood glucose greater than 300, please " ask patient not to eat food or drink anything other than water until correctional insulin has brought it back below 250    ** Please notify Endocrine for any change and/or advance in diet**  ** Please call Endocrine for any BG related issues **    Discharge Planning:   TBD. Please notify endocrinology prior to discharge.      Hypoglycemia  Suspect hypoglycemia was due to insulin doses in ESRD pt with reduced clearance  He was getting MDI doses gradually reduced -- even after scheduled levemir and novolog were stopped, he continued to get moderate dose correction scale Novolog  In this type of pt especially, would recommend low dose correction scale and not moderate  When he was getting high dose dexamethasone for covid, the mod dose was appropriate, but steroids have been stopped since 10/21 and he no longer warrants aggressive correction scale  Do not suspect endogenous insulin production in this pt at this time - would hold on off on hypoglycemia w/u unless persistent hypoglycemia without any insulin           Plan discussed with patient and RN at bedside.         Isabel Middletno MD  Endocrinology  [facility] - Cardiac Medical ICU   no

## 2023-10-23 NOTE — ASSESSMENT & PLAN NOTE
ESRD on HD  Cautious with insulin given decreased renal clearance as this is likely the reason for hypoglycemia.

## 2023-10-23 NOTE — ASSESSMENT & PLAN NOTE
Patient with Hypoxic Respiratory failure which is Acute.  he is not on home oxygen. Supplemental oxygen was provided and noted.  Signs/symptoms of respiratory failure include- respiratory distress. Contributing diagnoses includes - ARDS Labs and images were reviewed. Patient Has recent ABG, which has been reviewed. Will treat underlying causes and adjust management of respiratory failure as follows- continuing antibiotics and antivirals, adjustments of mechanical ventilation, HD and fluid regulation.     Extubated to 5L NC morning of 10/13. In no acute distress  VBG 7.2552  However mental status improving    -continue to monitor on NC  -ABG as warranted  -Currently on nasal canula 4L

## 2023-10-23 NOTE — PROGRESS NOTES
[facility]  Nephrology  Progress Note    Patient Name: Cosme Lewis  MRN: [id number]  Admission Date: 10/11/2023  Hospital Length of Stay: 12 days  Attending Provider: Marquis Fernández MD   Primary Care Physician: Eliecer Ohara III, MD  Principal Problem:Acute on chronic respiratory failure with hypoxia and hypercapnia    Subjective:     HPI: Cosme Lewis is a 59 yo male with PMHx of ESRD on iHD MWF who presents from his NH with AMS, hypoxia, and hypoglycemia.  He was placed on BiPAP and transferred to ICU for higher level of care.  CXR with evidence of pulmonary edema, BNP elevated > 3800.  Labs on chemistry without emergent electrolyte abnormalities consistent with ESRD status.  CBC without leukocytosis.  He was found to be COVID + and started on Broad spectrum Abx and steroids. He required D5W gtt for persistent hypoglycemia.  Nephrology has been consulted for ESRD management while IP.      Interval History:   No acute events overnight, on 2 liter nasal canulla.    Review of patient's allergies indicates:   Allergen Reactions    Heparin analogues      HIT KARLOS weakly positive, ELISE pending     Current Facility-Administered Medications   Medication Frequency    acetaminophen tablet 650 mg Q6H PRN    apixaban tablet 2.5 mg BID    atorvastatin tablet 40 mg Daily    carvediloL tablet 12.5 mg BID WM    dextrose 10% bolus 125 mL 125 mL PRN    dextrose 10% bolus 250 mL 250 mL PRN    epoetin xavier-epbx injection 3,510 Units Every Mon, Wed, Fri    FLUoxetine capsule 40 mg Daily    fluticasone furoate-vilanteroL 100-25 mcg/dose diskus inhaler 1 puff Daily    glucagon (human recombinant) injection 1 mg PRN    glucose chewable tablet 16 g PRN    glucose chewable tablet 24 g PRN    hydrALAZINE injection 10 mg Q6H PRN    insulin aspart U-100 pen 0-5 Units QID (AC + HS) PRN    melatonin tablet 9 mg Nightly PRN    multivitamin tablet Daily    NIFEdipine 24 hr tablet 60 mg BID    sodium chloride 0.9%  bolus 250 mL 250 mL PRN    tiotropium bromide 2.5 mcg/actuation inhaler 2 puff Daily       Objective:     Vital Signs (Most Recent):  Temp: 97.5 °F (36.4 °C) (10/23/23 0701)  Pulse: 71 (10/23/23 0900)  Resp: (!) 26 (10/23/23 0900)  BP: 139/79 (10/23/23 0900)  SpO2: (!) 93 % (10/23/23 0900) Vital Signs (24h Range):  Temp:  [97.5 °F (36.4 °C)-97.8 °F (36.6 °C)] 97.5 °F (36.4 °C)  Pulse:  [67-79] 71  Resp:  [13-35] 26  SpO2:  [86 %-100 %] 93 %  BP: (115-163)/(68-93) 139/79     Weight: 70.2 kg (154 lb 12.2 oz) (10/18/23 1100)  Body mass index is 22.85 kg/m².  Body surface area is 1.85 meters squared.    I/O last 3 completed shifts:  In: 24.9 [IV Piggyback:24.9]  Out: -      Physical Exam   Vitals and nursing note reviewed.   Constitutional:       General: He is not in acute distress.     Appearance: He is well-developed. He is ill-appearing.      Interventions: Nasal cannula in place.   HENT:      Head: Normocephalic and atraumatic.      Nose: No congestion or rhinorrhea.   Eyes:      General: No scleral icterus.        Right eye: No discharge.         Left eye: No discharge.      Extraocular Movements: Extraocular movements intact.      Conjunctiva/sclera: Conjunctivae normal.   Neck:      Vascular: JVD present.   Cardiovascular:      Rate and Rhythm: Normal rate and regular rhythm.      Heart sounds: No murmur heard.     No friction rub. No gallop.      Comments: UMA AVF  Pulmonary:      Effort: No respiratory distress.      Breath sounds: Rhonchi and rales present. No wheezing.   Abdominal:      General: There is no distension.      Palpations: Abdomen is soft.      Tenderness: There is no abdominal tenderness.   Musculoskeletal:         General: No swelling.      Cervical back: Normal range of motion.      Right lower leg: Edema present.      Left lower leg: Edema present.   Skin:     General: Skin is warm and dry.   Neurological:      Mental Status: He is alert. Mental status is at baseline.     Significant  Labs:  BMP:   Recent Labs   Lab 10/23/23  0314   GLU 89   *   K 4.8      CO2 20*   BUN 60*   CREATININE 3.9*   CALCIUM 7.2*   MG 2.0     CBC:   Recent Labs   Lab 10/23/23  0314   WBC 4.81   RBC 2.56*   HGB 7.5*   HCT 23.6*   *   MCV 92   MCH 29.3   MCHC 31.8*        Significant Imaging:  Labs: Reviewed    Assessment/Plan:     Renal/  ESRD (end stage renal disease)  ESRD on iHD [facility]  4 hours  EDW:  71 kg  UMA AVF    -Bedside Hd completed 10/20 , net uf 3 liters removed,  Plan/Recommendations:  -HD today, UF 2-3 liters  -continue strict I/O's  -RFP daily  -renal diet when advanced, 1L fluid restrictions  -Phos WNL, no need for binders at this time    Anemia of chronic kidney disease;    Hemoglobin   Date Value Ref Range Status   10/23/2023 7.5 (L) 14.0 - 18.0 g/dL Final   10/22/2023 8.2 (L) 14.0 - 18.0 g/dL Final   10/21/2023 7.8 (L) 14.0 - 18.0 g/dL Final     Saturated Iron   Date Value Ref Range Status   08/22/2023 19 (L) 20 - 50 % Final   02/23/2023 19 (L) 20 - 50 % Final     Ferritin   Date Value Ref Range Status   08/22/2023 1,455 (H) 20.0 - 300.0 ng/mL Final   02/23/2023 1,803 (H) 20.0 - 300.0 ng/mL Final     Iron   Date Value Ref Range Status   08/22/2023 48 45 - 160 ug/dL Final   02/23/2023 39 (L) 45 - 160 ug/dL Final     TIBC   Date Value Ref Range Status   08/22/2023 249 (L) 250 - 450 ug/dL Final   02/23/2023 203 (L) 250 - 450 ug/dL Final     HB target 10-12    Mineral Bone Disease;    Calcium   Date Value Ref Range Status   10/23/2023 7.2 (L) 8.7 - 10.5 mg/dL Final   10/22/2023 7.7 (L) 8.7 - 10.5 mg/dL Final     Phosphorus   Date Value Ref Range Status   10/23/2023 4.3 2.7 - 4.5 mg/dL Final   10/22/2023 4.0 2.7 - 4.5 mg/dL Final        Oncology  Anemia in ESRD (end-stage renal disease)  Below goal for ESRD  -EPO with HD          Thank you for your consult. I will follow-up with patient. Please contact us if you have any additional questions.    Jessica Crocker,  MD  Nephrology  [facility]

## 2023-10-23 NOTE — ASSESSMENT & PLAN NOTE
- Continues to have hyperglycemic episodes. Detemir was added on 10/21, however overcorrected today causing hypoglycemia.  - Endocrinology consult placed for assistance with creating an insulin/blood sugar regimen   - Reduced insulin per endocrinology recs.

## 2023-10-23 NOTE — SUBJECTIVE & OBJECTIVE
[facility] Medicine Services  PROGRESS NOTE    Patient Name: Omkar Nava  : 1959  MRN: [id number]    Date of Admission: 2023  Primary Care Physician: Deann Cao MD    Subjective   Subjective     CC:  F/u AMS     HPI:  Patient is resting in bed in NAD. He denies any pain. No acute events overnight per nursing.     ROS:  Unable to assess due to mental status      Objective   Objective     Vital Signs:   Temp:  [96.6 øF (35.9 øC)-98.1 øF (36.7 øC)] 98.1 øF (36.7 øC)  Heart Rate:  [57-81] 66  Resp:  [18] 18  BP: (158-191)/(70-98) 168/98     Physical Exam:  Constitutional: No acute distress, awake, alert  HENT: NCAT, mucous membranes moist  Respiratory: Clear to auscultation bilaterally, respiratory effort normal room air   Cardiovascular: RRR, no murmurs, rubs, or gallops  Gastrointestinal: Positive bowel sounds, soft, nontender, nondistended  Musculoskeletal: No bilateral ankle edema  Psychiatric: Appropriate affect, cooperative  Neurologic: Oriented x 1-2, strength symmetric in all extremities, Cranial Nerves grossly intact to confrontation, speech clear  Skin: No rashes      Results Reviewed:  LAB RESULTS:      Lab 23  0635 23  0424 23  1023 23  1022 23  1231   WBC 6.17 6.41 8.46  --  11.55*   HEMOGLOBIN 7.9* 8.0* 8.9*  --  9.3*   HEMATOCRIT 24.2* 25.1* 27.6*  --  29.6*   PLATELETS 222 205 236  --  256   NEUTROS ABS  --  3.52 5.72  --  9.17*   IMMATURE GRANS (ABS)  --  0.02 0.03  --  0.06*   LYMPHS ABS  --  1.91 1.68  --  1.73   MONOS ABS  --  0.66 0.82  --  0.55   EOS ABS  --  0.28 0.18  --  0.01   MCV 88.3 88.4 87.9  --  88.9   CRP  --   --  0.59*  --   --    PROCALCITONIN  --   --  0.09  --  0.05   LACTATE  --  0.9  --  0.8 1.1         Lab 23  0424 23  1023 23  1231   SODIUM 140 140  140 138   POTASSIUM 4.0 4.2  4.2 5.2   CHLORIDE 105 106  106 103   CO2 25.0 24.0  24.0 22.0   ANION GAP 10.0 10.0  10.0 13.0  Interval History:   No acute events overnight, on 2 liter nasal canulla.    Review of patient's allergies indicates:   Allergen Reactions    Heparin analogues      HIT KARLOS weakly positive, ELISE pending     Current Facility-Administered Medications   Medication Frequency    acetaminophen tablet 650 mg Q6H PRN    apixaban tablet 2.5 mg BID    atorvastatin tablet 40 mg Daily    carvediloL tablet 12.5 mg BID WM    dextrose 10% bolus 125 mL 125 mL PRN    dextrose 10% bolus 250 mL 250 mL PRN    epoetin xavier-epbx injection 3,510 Units Every Mon, Wed, Fri    FLUoxetine capsule 40 mg Daily    fluticasone furoate-vilanteroL 100-25 mcg/dose diskus inhaler 1 puff Daily    glucagon (human recombinant) injection 1 mg PRN    glucose chewable tablet 16 g PRN    glucose chewable tablet 24 g PRN    hydrALAZINE injection 10 mg Q6H PRN    insulin aspart U-100 pen 0-5 Units QID (AC + HS) PRN    melatonin tablet 9 mg Nightly PRN    multivitamin tablet Daily    NIFEdipine 24 hr tablet 60 mg BID    sodium chloride 0.9% bolus 250 mL 250 mL PRN    tiotropium bromide 2.5 mcg/actuation inhaler 2 puff Daily       Objective:     Vital Signs (Most Recent):  Temp: 97.5 °F (36.4 °C) (10/23/23 0701)  Pulse: 71 (10/23/23 0900)  Resp: (!) 26 (10/23/23 0900)  BP: 139/79 (10/23/23 0900)  SpO2: (!) 93 % (10/23/23 0900) Vital Signs (24h Range):  Temp:  [97.5 °F (36.4 °C)-97.8 °F (36.6 °C)] 97.5 °F (36.4 °C)  Pulse:  [67-79] 71  Resp:  [13-35] 26  SpO2:  [86 %-100 %] 93 %  BP: (115-163)/(68-93) 139/79     Weight: 70.2 kg (154 lb 12.2 oz) (10/18/23 1100)  Body mass index is 22.85 kg/m².  Body surface area is 1.85 meters squared.    I/O last 3 completed shifts:  In: 24.9 [IV Piggyback:24.9]  Out: -      Physical Exam   Vitals and nursing note reviewed.   Constitutional:       General: He is not in acute distress.     Appearance: He is well-developed. He is ill-appearing.      Interventions: Nasal cannula in place.   HENT:      Head: Normocephalic and    BUN 18 20  20 22   CREATININE 1.24 1.31*  1.31* 1.23   EGFR 64.5 60.4  60.4 65.2   GLUCOSE 177* 150*  150* 159*   CALCIUM 8.1* 8.7  8.7 9.3   MAGNESIUM  --   --  1.8         Lab 08/03/23  0424 08/02/23  1023 07/31/23  1231   TOTAL PROTEIN 5.6* 6.4 7.6   ALBUMIN 3.1* 3.2* 3.8   GLOBULIN 2.5 3.2 3.8   ALT (SGPT) 15 19 25   AST (SGOT) 13 17 25   BILIRUBIN <0.2 <0.2 <0.2   ALK PHOS 67 65 75   LIPASE  --   --  19                 Lab 08/05/23  0635   IRON 40*   IRON SATURATION (TSAT) 14*   TIBC 282*   TRANSFERRIN 189*   FERRITIN 136.20   FOLATE 8.68   VITAMIN B 12 507         Brief Urine Lab Results  (Last result in the past 365 days)        Color   Clarity   Blood   Leuk Est   Nitrite   Protein   CREAT   Urine HCG        08/02/23 1319 Yellow   Clear   Negative   Negative   Negative   30 mg/dL (1+)                   Microbiology Results Abnormal       Procedure Component Value - Date/Time    Blood Culture - Blood, Hand, Left [[id number]]  (Normal) Collected: 07/31/23 1246    Lab Status: Final result Specimen: Blood from Hand, Left Updated: 08/05/23 1316     Blood Culture No growth at 5 days    Blood Culture - Blood, Arm, Right [[id number]]  (Normal) Collected: 07/31/23 1246    Lab Status: Final result Specimen: Blood from Arm, Right Updated: 08/05/23 1316     Blood Culture No growth at 5 days    Respiratory Panel PCR w/COVID-19(SARS-CoV-2) GIANNI/SIENNA/ANNA/PAD/COR/MAD/ASHIA In-House, NP Swab in UTM/VTM, 3-4 HR TAT - Swab, Nasopharynx [[id number]]  (Normal) Collected: 07/31/23 1328    Lab Status: Final result Specimen: Swab from Nasopharynx Updated: 07/31/23 1439     ADENOVIRUS, PCR Not Detected     Coronavirus 229E Not Detected     Coronavirus HKU1 Not Detected     Coronavirus NL63 Not Detected     Coronavirus OC43 Not Detected     COVID19 Not Detected     Human Metapneumovirus Not Detected     Human Rhinovirus/Enterovirus Not Detected     Influenza A PCR Not Detected     Influenza B PCR Not Detected     Parainfluenza Virus  atraumatic.      Nose: No congestion or rhinorrhea.   Eyes:      General: No scleral icterus.        Right eye: No discharge.         Left eye: No discharge.      Extraocular Movements: Extraocular movements intact.      Conjunctiva/sclera: Conjunctivae normal.   Neck:      Vascular: JVD present.   Cardiovascular:      Rate and Rhythm: Normal rate and regular rhythm.      Heart sounds: No murmur heard.     No friction rub. No gallop.      Comments: UMA AVF  Pulmonary:      Effort: No respiratory distress.      Breath sounds: Rhonchi and rales present. No wheezing.   Abdominal:      General: There is no distension.      Palpations: Abdomen is soft.      Tenderness: There is no abdominal tenderness.   Musculoskeletal:         General: No swelling.      Cervical back: Normal range of motion.      Right lower leg: Edema present.      Left lower leg: Edema present.   Skin:     General: Skin is warm and dry.   Neurological:      Mental Status: He is alert. Mental status is at baseline.     Significant Labs:  BMP:   Recent Labs   Lab 10/23/23  0314   GLU 89   *   K 4.8      CO2 20*   BUN 60*   CREATININE 3.9*   CALCIUM 7.2*   MG 2.0     CBC:   Recent Labs   Lab 10/23/23  0314   WBC 4.81   RBC 2.56*   HGB 7.5*   HCT 23.6*   *   MCV 92   MCH 29.3   MCHC 31.8*        Significant Imaging:  Labs: Reviewed   1 Not Detected     Parainfluenza Virus 2 Not Detected     Parainfluenza Virus 3 Not Detected     Parainfluenza Virus 4 Not Detected     RSV, PCR Not Detected     Bordetella pertussis pcr Not Detected     Bordetella parapertussis PCR Not Detected     Chlamydophila pneumoniae PCR Not Detected     Mycoplasma pneumo by PCR Not Detected    Narrative:      In the setting of a positive respiratory panel with a viral infection PLUS a negative procalcitonin without other underlying concern for bacterial infection, consider observing off antibiotics or discontinuation of antibiotics and continue supportive care. If the respiratory panel is positive for atypical bacterial infection (Bordetella pertussis, Chlamydophila pneumoniae, or Mycoplasma pneumoniae), consider antibiotic de-escalation to target atypical bacterial infection.            No radiology results from the last 24 hrs        Current medications:  Scheduled Meds:apixaban, 2.5 mg, Oral, Q12H  brimonidine, 1 drop, Both Eyes, TID   And  timolol, 1 drop, Both Eyes, BID  cefuroxime, 500 mg, Oral, Q12H  divalproex, 250 mg, Oral, BID  doxycycline, 100 mg, Oral, Q12H  famotidine, 20 mg, Oral, BID  ferrous sulfate, 325 mg, Oral, Daily With Breakfast  hydrALAZINE, 50 mg, Oral, Q8H  insulin detemir, 5 Units, Subcutaneous, Nightly  insulin lispro, 2-7 Units, Subcutaneous, 4x Daily AC & at Bedtime  Insulin Lispro, 3 Units, Subcutaneous, TID With Meals  lisinopril, 40 mg, Oral, Daily  melatonin, 5 mg, Oral, Nightly  povidone-iodine, 1 application , Topical, Daily  senna-docusate sodium, 2 tablet, Oral, BID  sodium chloride, 10 mL, Intravenous, Q12H  thiamine, 100 mg, Oral, TID      Continuous Infusions:   PRN Meds:.  acetaminophen    senna-docusate sodium **AND** polyethylene glycol **AND** bisacodyl **AND** bisacodyl    dextrose    dextrose    glucagon (human recombinant)    hydrALAZINE    hydrOXYzine    ondansetron    [COMPLETED] Insert Peripheral IV **AND** sodium chloride     sodium chloride    sodium chloride    sodium chloride    ziprasidone    Assessment & Plan   Assessment & Plan     Active Hospital Problems    Diagnosis  POA    **AMS (altered mental status) [R41.82]  Yes    S/P transmetatarsal amputation of foot, left [Z89.432]  Not Applicable    Neurocognitive disorder [R41.9]  Yes    Type 2 diabetes mellitus [E11.9]  Yes    Essential hypertension [I10]  Yes    Psychotic disorder [F29]  Yes      Resolved Hospital Problems   No resolved problems to display.        Brief Hospital Course to date:  Omkar Nava is a 63 y.o. 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    Plan was partially entered by my partner and I have reviewed and updated as appropriate on 8/7/23     AMS   Fever/Sepsis - concern for CNS infection  - empirically covered for meningitis initially  - LP attempted in the ER as well as IR without success -mostly due to agitation  - infectious work-up including UA, resp PCR, CT abd/pelvis, CT head, CXR unremarkable for clear source of fever  - could consider partially treated cystitis as etiology, however UA is bland  - ID following s/p IV vanc and rocephin changed to PO doxy and cefuroxime 8/7/2023.  Stopped acyclovir.  D/w Dr. Gordon 8/2/23 who believes patient close to baseline and with difficulties with patient cooperating with LP  ok with not attempting again.      Dementia w/Psychosis:  -- Depakote, PRN atarax   - will discontinue ativan per family request.  Continue geodon as needed has not had a dose     Iron Def anemia  -- cont oral iron   -- baseline hgb 7-9     DMII  - SSI coverage for now  -- add basal and bolus      HTN:  - continue home regimen, PRN IV medications  -- increase hydralazine        Expected Discharge Location and Transportation: SNF, daughter would like another  facility  Expected Discharge 8/7  Expected Discharge Date: 8/7/2023; Expected Discharge Time:      DVT prophylaxis:  Medical DVT prophylaxis orders are present.     AM-PAC 6 Clicks Score (PT): 10 (08/05/23 0822)    CODE STATUS:   Code Status and Medical Interventions:   Ordered at: 07/31/23 1801     Level Of Support Discussed With:    Health Care Surrogate     Code Status (Patient has no pulse and is not breathing):    CPR (Attempt to Resuscitate)     Medical Interventions (Patient has pulse or is breathing):    Full Support     Release to patient:    Routine Release       Chely Lion, APRN  08/06/23

## 2023-10-23 NOTE — ASSESSMENT & PLAN NOTE
Suspect hypoglycemia was due to insulin doses in ESRD pt with reduced clearance  He was getting MDI doses gradually reduced -- even after scheduled levemir and novolog were stopped, he continued to get moderate dose correction scale Novolog  In this type of pt especially, would recommend low dose correction scale and not moderate  When he was getting high dose dexamethasone for covid, the mod dose was appropriate, but steroids have been stopped since 10/21 and he no longer warrants aggressive correction scale  Do not suspect endogenous insulin production in this pt at this time - would hold on off on hypoglycemia w/u unless persistent hypoglycemia without any insulin

## 2023-10-23 NOTE — ASSESSMENT & PLAN NOTE
Patient with Hypercapnic and Hypoxic Respiratory failure which is Chronic.  he is not on home oxygen. Supplemental oxygen was provided and noted-  Signs/symptoms of respiratory failure include- tachypnea, increased work of breathing and use of accessory muscles. Contributing diagnoses includes - CHF, COPD and fluid volume excess Labs and images were reviewed. Patient Has recent ABG, which has been reviewed. Will treat underlying causes and adjust management of respiratory failure as follows-     58 year old male with multiple medical problems who presented to [facility] ED via EMS for AMS, hypoglycemia, and hypoxia placed on NIPPV. SpO2 in 50s on arrival which improved with HFNC, however, he remained tachypnic with accessory muscle use, therefore he was placed on NIPPV. CXR with bilateral opacifications R > L and BNP elevated > 4900.     Intubated and mechanically ventilated, extubated to NC 10/13, sating well.  Chest x-ray concerning for pneumonia vs ARDS    · Weaning off O2; currently on 4L NC with minimal subjective dyspnea at rest. Trial of RA on 10/20 was unsuccessful, consider retrying again today.  · Remdesivir completed. Dexamethasone 6 mg daily  · CAP coverage with Vanc/Zosyn completed   · HD for volume clearance per Nephro schedule  · Duoneb nebulizer, tiotropium and Fluticasone furoate / Vilanterol

## 2023-10-23 NOTE — SUBJECTIVE & OBJECTIVE
Interval History/Significant Events: Pt required haloperidol and melatonin for night restlessness.    Review of Systems  Objective:     Vital Signs (Most Recent):  Temp: 97.6 °F (36.4 °C) (10/23/23 1500)  Pulse: 76 (10/23/23 1700)  Resp: (!) 26 (10/23/23 1700)  BP: 109/66 (10/23/23 1700)  SpO2: (!) 91 % (10/23/23 1700) Vital Signs (24h Range):  Temp:  [97.5 °F (36.4 °C)-97.8 °F (36.6 °C)] 97.6 °F (36.4 °C)  Pulse:  [69-79] 76  Resp:  [14-35] 26  SpO2:  [86 %-100 %] 91 %  BP: (109-163)/(61-93) 109/66   Weight: 70.2 kg (154 lb 12.2 oz)  Body mass index is 22.85 kg/m².      Intake/Output Summary (Last 24 hours) at 10/23/2023 1852  Last data filed at 10/23/2023 1300  Gross per 24 hour   Intake 400 ml   Output 1 ml   Net 399 ml          Physical Exam  Constitutional:       General: He is not in acute distress.     Appearance: Normal appearance. He is ill-appearing. He is not toxic-appearing or diaphoretic.   HENT:      Head: Normocephalic and atraumatic.      Nose: No congestion or rhinorrhea.      Mouth/Throat:      Mouth: Mucous membranes are moist.   Eyes:      Extraocular Movements: Extraocular movements intact.      Pupils: Pupils are equal, round, and reactive to light.   Cardiovascular:      Rate and Rhythm: Normal rate.      Pulses: Normal pulses.      Heart sounds: Normal heart sounds.   Pulmonary:      Effort: Pulmonary effort is normal. No respiratory distress.      Breath sounds: No stridor. No wheezing, rhonchi or rales.      Comments: Reduced breath sounds bilat.  Chest:      Chest wall: No tenderness.   Abdominal:      General: Abdomen is flat. There is no distension.      Palpations: Abdomen is soft. There is no mass.      Tenderness: There is no abdominal tenderness. There is no guarding.   Genitourinary:     Penis: Normal.    Musculoskeletal:      Cervical back: Normal range of motion and neck supple.   Skin:     General: Skin is warm and dry.   Neurological:      General: No focal deficit present.       Mental Status: He is alert and oriented to person, place, and time. Mental status is at baseline.   Psychiatric:         Mood and Affect: Mood normal.         Behavior: Behavior normal.         Thought Content: Thought content normal.         Judgment: Judgment normal.            Vents:  Vent Mode: Spont (10/12/23 1503)  Set Rate: 20 BPM (10/12/23 1141)  Vt Set: 380 mL (10/12/23 1141)  Pressure Support: 5 cmH20 (10/12/23 1503)  PEEP/CPAP: 5 cmH20 (10/12/23 1503)  Oxygen Concentration (%): 40 (10/23/23 0500)  Peak Airway Pressure: 11 cmH20 (10/12/23 1503)  Plateau Pressure: 20 cmH20 (10/12/23 1503)  Total Ve: 12.8 L/m (10/12/23 1503)  Negative Inspiratory Force (cm H2O): 0 (10/12/23 1503)  F/VT Ratio<105 (RSBI): (!) 97.83 (10/12/23 1503)  Lines/Drains/Airways       Peripheral Intravenous Line  Duration                  Hemodialysis AV Fistula Left upper arm -- days         Midline Catheter Insertion/Assessment  - Single Lumen 10/22/23 1520 Right brachial vein 20g x 10cm 1 day                  Significant Labs:    CBC/Anemia Profile:  Recent Labs   Lab 10/22/23  0319 10/23/23  0314   WBC 5.10 4.81   HGB 8.2* 7.5*   HCT 25.8* 23.6*   * 140*   MCV 91 92   RDW 18.4* 18.5*        Chemistries:  Recent Labs   Lab 10/22/23  0319 10/23/23  0314    134*   K 4.8 4.8    100   CO2 23 20*   BUN 53* 60*   CREATININE 3.5* 3.9*   CALCIUM 7.7* 7.2*   ALBUMIN 2.2* 2.1*   PROT 5.8* 5.4*   BILITOT 1.0 0.9   ALKPHOS 151* 148*   ALT 23 22   AST 24 20   MG 2.0 2.0   PHOS 4.0 4.3       All pertinent labs within the past 24 hours have been reviewed.    Significant Imaging:  I have reviewed all pertinent imaging results/findings within the past 24 hours.

## 2023-10-23 NOTE — ASSESSMENT & PLAN NOTE
Hypoglycemic to 30s at nursing home and given dextrose. Persistently hypoG in the ED at [facility] and was started on a continuous dextrose infusion    -- Insulin level elevated, C-peptide level normal   -- Q1H accu checks   -- Continue D20 infusion   -- Hypoglycemia protocol   -- Reduced exogenous insulin per endocrinology recs

## 2023-10-23 NOTE — HPI
"Cosme Lewis is a 59yo M with T2DM (on insulin at home), ESRD on HD, COPD, HFrEF, and HTN from [facility] who was brought in to the hospital on 10/11/2023 for evaluation of shortness of breath. Patient was diagnosed and treated for COVID pneumonia and sepsis. On admission patient was hypoglycemic felt to be secondary to administration of insulin at the nursing home. Hospitalization has been further complicated by acute encephalopathy.     Since admission patient has been treated with various steroids, levemir and novolog with some hypoglycemia. He received 10 days of high dose dexamethasone for covid. Last dose of dexamethasone was on 10/21. At time of endocrinology consult pt was getting Novolog moderate dose correction scale. Levemir was discontinued several days ago 2/2 hypoglycemia. Endocrinology is consulted for "Labile BG, request recs for new home regimen"    Since discontinuing levemir BG has been >70, although this AM 70s-80s. Getting a significant amount of SSI due to moderate dose correction being ordered instead of low dose in this pt with ESRD.         Regarding T2DM  LOV with NP Snow Calle 07/2023  Diagnosed with DM around 4 years ago     Outpatient insulin regimen:  Levemir 8 units twice daily   Humalog 8 units plus correction scale before meals   Add correction scale as needed.  Blood sugar 201 to 250 add 2 units  Blood sugar 251 to 300 add 4 units  Blood sugar 301 to 350 add 6 units  Blood sugar greater than 350 add 8 units     "

## 2023-10-23 NOTE — PROGRESS NOTES
[facility] - Cardiac Medical ICU  Critical Care Medicine  Progress Note    Patient Name: Cosme Lewis  MRN: [id number]  Admission Date: 10/11/2023  Hospital Length of Stay: 12 days  Code Status: Full Code  Attending Provider: Marquis Fernández MD  Primary Care Provider: Eliecer Ohara III, MD   Principal Problem: Acute on chronic respiratory failure with hypoxia and hypercapnia    Subjective:     HPI:  Mr. Lewis is a 58 year old male with PMH notable for COPD, HFrEF (45% and DD), ESRD on dialysis, HTN, DM presents via EMS from nursing home for altered mental status and hypoxia.  Per EMS, patient was found to have glucose of 38, he received D50 with improvement in his glucose.  He also had SpO2 58% which improved with 15 L nasal cannula.     ED work up revealed VBG 7.27/61 and he was subsequently placed on bipap for work of breathing. He was persistently hypoglycemic with BG 59 --> 34 despite IV dextrose, therefore he was started on a continuous dextrose infusion. Other lab work up revealed WBC 3, stable, H//H 9.5/29, Na 143, CO2 20, Cr/BUN 3.8/38, elevated AST/ALT/alk phos, and troponin 0.174 with no ischemic changes on EKG. He was given IV rocephin and doxycyline. Chest XR with bilateral opacifications R > L.    Critical care consulted for acute hypoxemic respiratory failure and NIPPV      Hospital/ICU Course:  In the MICU, the patient arrived on bipap but was found to still be hypoxic which initially improved after adjusting his bipap settings. He continued to be altered and tried occasionally became combative requiring precedex which was affective when maxed out at 1.4 mcg/kg/hour. During this time he was also found to have worsening hypoglycemia which required continuous D10 administration at a max rate of 200 cc/hour. Eventually his oxygen status started declining and the decision was made to intubate the patient. It is believed that worsening of his oxygen status was in part due the large volume of D10 he received to  maintain his blood sugars. For this reason, it was decided that D20 at a slower rate would be more appropriate, and for this a central line was placed. Additionally, he received a round of hemodialysis. He is receiving remdesivir for COVID and vacomycin/zosyn/doxycycline for community acquired pneumonia coverage/concerning chest x-ray. The patient's sugars started rising on the D20 and eventually he started becoming hyperglycemic  to the 220s which led to the D20 being stopped for the time being and since that time the sugars have been relatively normoglycemic. His abdomen was found to be slightly more distended but KUB was negative for bowel obstruction, likely suggesting ascites as the source.    On 10/12 after passing SAT and SBT, the decision was made to extubate the patient which was done successfully. All sedatives were turned off at this time as well. He was placed on high flow at the time and has been sating well. He continued to receive antibiotics as well as antivirals for CAP coverage/COVID respectively which was able to help improve his likely pneumonia vs ARDS.     Between 10/14 - 10/17 he went back and forth between requiring high flow and bipap, however starting on 10/17 he was able to tolerate nasal canula.    Between 10/18 - 10/22 his nasal canula was slowly weaned down to around 3L, sometimes requiring more. This oxygen level requirement may represent a baseline. Additionally during this time, his blood sugars remained labile and difficult to manage, as overcorrection occurred multiple times. For this reason, endocrinology was consulted for assistance in creating a blood sugar regime.       10/23/23 Spoke with ID about his COVID precaution status.  They said if he is immunocompromised he needs to be in isolation 20 days after his positive COVID test.      Interval History/Significant Events: Pt required haloperidol and melatonin for night restlessness.    Review of Systems  Objective:     Vital  Signs (Most Recent):  Temp: 97.6 °F (36.4 °C) (10/23/23 1500)  Pulse: 76 (10/23/23 1700)  Resp: (!) 26 (10/23/23 1700)  BP: 109/66 (10/23/23 1700)  SpO2: (!) 91 % (10/23/23 1700) Vital Signs (24h Range):  Temp:  [97.5 °F (36.4 °C)-97.8 °F (36.6 °C)] 97.6 °F (36.4 °C)  Pulse:  [69-79] 76  Resp:  [14-35] 26  SpO2:  [86 %-100 %] 91 %  BP: (109-163)/(61-93) 109/66   Weight: 70.2 kg (154 lb 12.2 oz)  Body mass index is 22.85 kg/m².      Intake/Output Summary (Last 24 hours) at 10/23/2023 1852  Last data filed at 10/23/2023 1300  Gross per 24 hour   Intake 400 ml   Output 1 ml   Net 399 ml          Physical Exam  Constitutional:       General: He is not in acute distress.     Appearance: Normal appearance. He is ill-appearing. He is not toxic-appearing or diaphoretic.   HENT:      Head: Normocephalic and atraumatic.      Nose: No congestion or rhinorrhea.      Mouth/Throat:      Mouth: Mucous membranes are moist.   Eyes:      Extraocular Movements: Extraocular movements intact.      Pupils: Pupils are equal, round, and reactive to light.   Cardiovascular:      Rate and Rhythm: Normal rate.      Pulses: Normal pulses.      Heart sounds: Normal heart sounds.   Pulmonary:      Effort: Pulmonary effort is normal. No respiratory distress.      Breath sounds: No stridor. No wheezing, rhonchi or rales.      Comments: Reduced breath sounds bilat.  Chest:      Chest wall: No tenderness.   Abdominal:      General: Abdomen is flat. There is no distension.      Palpations: Abdomen is soft. There is no mass.      Tenderness: There is no abdominal tenderness. There is no guarding.   Genitourinary:     Penis: Normal.    Musculoskeletal:      Cervical back: Normal range of motion and neck supple.   Skin:     General: Skin is warm and dry.   Neurological:      General: No focal deficit present.      Mental Status: He is alert and oriented to person, place, and time. Mental status is at baseline.   Psychiatric:         Mood and Affect:  "Mood normal.         Behavior: Behavior normal.         Thought Content: Thought content normal.         Judgment: Judgment normal.            Vents:  Vent Mode: Spont (10/12/23 1503)  Set Rate: 20 BPM (10/12/23 1141)  Vt Set: 380 mL (10/12/23 1141)  Pressure Support: 5 cmH20 (10/12/23 1503)  PEEP/CPAP: 5 cmH20 (10/12/23 1503)  Oxygen Concentration (%): 40 (10/23/23 0500)  Peak Airway Pressure: 11 cmH20 (10/12/23 1503)  Plateau Pressure: 20 cmH20 (10/12/23 1503)  Total Ve: 12.8 L/m (10/12/23 1503)  Negative Inspiratory Force (cm H2O): 0 (10/12/23 1503)  F/VT Ratio<105 (RSBI): (!) 97.83 (10/12/23 1503)  Lines/Drains/Airways       Peripheral Intravenous Line  Duration                  Hemodialysis AV Fistula Left upper arm -- days         Midline Catheter Insertion/Assessment  - Single Lumen 10/22/23 1520 Right brachial vein 20g x 10cm 1 day                  Significant Labs:    CBC/Anemia Profile:  Recent Labs   Lab 10/22/23  0319 10/23/23  0314   WBC 5.10 4.81   HGB 8.2* 7.5*   HCT 25.8* 23.6*   * 140*   MCV 91 92   RDW 18.4* 18.5*        Chemistries:  Recent Labs   Lab 10/22/23  0319 10/23/23  0314    134*   K 4.8 4.8    100   CO2 23 20*   BUN 53* 60*   CREATININE 3.5* 3.9*   CALCIUM 7.7* 7.2*   ALBUMIN 2.2* 2.1*   PROT 5.8* 5.4*   BILITOT 1.0 0.9   ALKPHOS 151* 148*   ALT 23 22   AST 24 20   MG 2.0 2.0   PHOS 4.0 4.3       All pertinent labs within the past 24 hours have been reviewed.    Significant Imaging:  I have reviewed all pertinent imaging results/findings within the past 24 hours.      ABG  No results for input(s): "PH", "PO2", "PCO2", "HCO3", "BE" in the last 168 hours.  Assessment/Plan:     Pulmonary  * Acute on chronic respiratory failure with hypoxia and hypercapnia  Patient with Hypercapnic and Hypoxic Respiratory failure which is Chronic.  he is not on home oxygen. Supplemental oxygen was provided and noted-  Signs/symptoms of respiratory failure include- tachypnea, increased work " of breathing and use of accessory muscles. Contributing diagnoses includes - CHF, COPD and fluid volume excess Labs and images were reviewed. Patient Has recent ABG, which has been reviewed. Will treat underlying causes and adjust management of respiratory failure as follows-     58 year old male with multiple medical problems who presented to [facility] ED via EMS for AMS, hypoglycemia, and hypoxia placed on NIPPV. SpO2 in 50s on arrival which improved with HFNC, however, he remained tachypnic with accessory muscle use, therefore he was placed on NIPPV. CXR with bilateral opacifications R > L and BNP elevated > 4900.     Intubated and mechanically ventilated, extubated to NC 10/13, sating well.  Chest x-ray concerning for pneumonia vs ARDS    · Weaning off O2; currently on 4L NC with minimal subjective dyspnea at rest. Trial of RA on 10/20 was unsuccessful, consider retrying again today.  · Remdesivir completed. Dexamethasone 6 mg daily  · CAP coverage with Vanc/Zosyn completed   · HD for volume clearance per Nephro schedule  · Duoneb nebulizer, tiotropium and Fluticasone furoate / Vilanterol       Dyspnea  Resolved    Acute respiratory failure with hypoxia and hypercapnia  Patient with Hypoxic Respiratory failure which is Acute.  he is not on home oxygen. Supplemental oxygen was provided and noted.  Signs/symptoms of respiratory failure include- respiratory distress. Contributing diagnoses includes - ARDS Labs and images were reviewed. Patient Has recent ABG, which has been reviewed. Will treat underlying causes and adjust management of respiratory failure as follows- continuing antibiotics and antivirals, adjustments of mechanical ventilation, HD and fluid regulation.     Extubated to 5L NC morning of 10/13. In no acute distress  VBG 7.2552  However mental status improving    -continue to monitor on NC  -ABG as warranted  -Currently on nasal canula 4L    COPD (chronic obstructive pulmonary disease)  --Duo roxi  Q4wake  --Currently on nasal canula 4L  --Fluticasone furoate / Vilanterol and Salmeterol     Cardiac/Vascular  HLD (hyperlipidemia)  -- LFT's have improved, atorvastatin 40 mg daily      Chronic diastolic heart failure  ECHO 09/23:       Left Ventricle: The left ventricle is normal in size. Increased wall thickness. There is concentric hypertrophy. Mild global hypokinesis present. There is mildly reduced systolic function with a visually estimated ejection fraction of 40 - 45%. There is indeterminate diastolic function.    Right Ventricle: Normal right ventricular cavity size. Right ventricle wall motion  is normal. Systolic function is borderline low.    Left Atrium: Left atrium is severely dilated.    Tricuspid Valve: There is mild regurgitation.    Pericardium: There is a trivial effusion. Echodensity seen adjacent to the visceral pericardium of uncertain significance. This may be pericardial fat although other etiologies not excluded. Correlate clinically.    PASP is at least 46 mmHg.    BNP > 4900    --Volume removal per iHD. Appreciate nephrology assistance   --Blood pressure started becoming elevated on 10/17. Home nifedipine and lisinopril restarted as pressures have been within normal limits. Hydralazine pushes prn should pressures rise.     Renal/  ESRD (end stage renal disease)  Outpatient HD unit: [facility]   HD tx days: MWF   HD tx time: 4hrs   HD access: LUE AVF   HD modality: iHD   Residual urine: Anuric   Estimated Creatinine Clearance: 33.8 mL/min (A) (based on SCr of 2.3 mg/dL (H)).    Toelrating iHD well    -- Nephrology consulted for iHD management. Appreciate their assistance   -- CMP daily and trend   -- Avoid nephrotoxins   -- Renally dose all medications to appropriate GFR / CrCl   -- Hgb > 7 and MAP > 65 goals     Hematology  History of pulmonary embolism  Diagnosed 10/2022  Repeat CT 3/2023 with resolution    -heparin stopped as patient's platelet level fell to 35,000. Has  since recovered so home eliquis restarted    Oncology  Anemia in ESRD (end-stage renal disease)  Recent Labs   Lab 10/23/23  0314   WBC 4.81   RBC 2.56*   HGB 7.5*   HCT 23.6*   *   MCV 92   MCH 29.3   MCHC 31.8*     · CBC daily and trend   · Transfuse for hgb < 7   · Receives EPO with iHD    Endocrine  Type 2 diabetes mellitus with renal complication  - Continues to have hyperglycemic episodes. Detemir was added on 10/21, however overcorrected today causing hypoglycemia.  - Endocrinology consult placed for assistance with creating an insulin/blood sugar regimen   - Reduced insulin per endocrinology recs.      Hypoglycemia  Hypoglycemic to 30s at nursing home and given dextrose. Persistently hypoG in the ED at [facility] and was started on a continuous dextrose infusion    -- Insulin level elevated, C-peptide level normal   -- Q1H accu checks   -- Continue D20 infusion   -- Hypoglycemia protocol   -- Reduced exogenous insulin per endocrinology recs      Palliative Care  Advance care planning  Palliative care consulted for outpatient sx mgmt recs; appreciate assist       Critical Care Daily Checklist:    A: Awake: RASS Goal/Actual Goal: RASS Goal: 0-->alert and calm  Actual:     B: Spontaneous Breathing Trial Performed? Spon. Breathing Trial Initiated?: Not initiated (10/12/23 0742)   C: SAT & SBT Coordinated?  NA                      D: Delirium: CAM-ICU Overall CAM-ICU: Negative   E: Early Mobility Performed? No   F: Feeding Goal: Goals: Meet % EEN, EPN by RD f/u date  Status: Nutrition Goal Status: goal met   Current Diet Order   Procedures    Diet diabetic Minced & Moist (IDDSI Level 5); 2000 Calorie     Order Specific Question:   Additional Diet Options:     Answer:   Minced & Moist (IDDSI Level 5)     Order Specific Question:   Total calories:     Answer:   2000 Calorie      AS: Analgesia/Sedation NA   T: Thromboembolic Prophylaxis apixaban   H: HOB > 300 Yes   U: Stress Ulcer Prophylaxis (if needed) NA    G: Glucose Control Insulin and glucagon   B: Bowel Function Stool Occurrence: 1   I: Indwelling Catheter (Lines & Dutta) Necessity Midline on right brachial    D: De-escalation of Antimicrobials/Pharmacotherapies NA    Plan for the day/ETD     Code Status:  Family/Goals of Care: Full Code         Critical secondary to Patient has a condition that poses threat to life and bodily function: Severe Respiratory Distress      Critical care was time spent personally by me on the following activities: development of treatment plan with patient or surrogate and bedside caregivers, discussions with consultants, evaluation of patient's response to treatment, examination of patient, ordering and performing treatments and interventions, ordering and review of laboratory studies, ordering and review of radiographic studies, pulse oximetry, re-evaluation of patient's condition. This critical care time did not overlap with that of any other provider or involve time for any procedures.     Emile Morales MD  Critical Care Medicine  [facility] - Cardiac Medical ICU

## 2023-10-23 NOTE — ASSESSMENT & PLAN NOTE
Diagnosed 10/2022  Repeat CT 3/2023 with resolution    -heparin stopped as patient's platelet level fell to 35,000. Has since recovered so home eliquis restarted

## 2023-10-23 NOTE — ASSESSMENT & PLAN NOTE
Endocrinology consulted for BG management.   Inpatient BG goal 140-180    - Novolog (Insulin Aspart) low dose SSI prn for now (150/50)  - He may need low dose levemir added back but for now would monitor on LDC only given severe hypoglycemia earlier this admission   - BG checks AC/HS/0200 or q4h if not eating meals   - Hypoglycemia protocol in place  - If blood glucose greater than 300, please ask patient not to eat food or drink anything other than water until correctional insulin has brought it back below 250    ** Please notify Endocrine for any change and/or advance in diet**  ** Please call Endocrine for any BG related issues **    Discharge Planning:   TBD. Please notify endocrinology prior to discharge.

## 2023-10-23 NOTE — ASSESSMENT & PLAN NOTE
Still requiring supplemental O2  COVID dx on admission -- was on high dose steroids which was increasing insulin requirement, but last dose of dex was 10/21

## 2023-10-23 NOTE — ASSESSMENT & PLAN NOTE
Recent Labs   Lab 10/23/23  0314   WBC 4.81   RBC 2.56*   HGB 7.5*   HCT 23.6*   *   MCV 92   MCH 29.3   MCHC 31.8*     · CBC daily and trend   · Transfuse for hgb < 7   · Receives EPO with iHD

## 2023-10-23 NOTE — PLAN OF CARE
MICU DAILY GOALS     Family/Goals of care/Code Status   Code Status: Full Code    24H Vital Sign Range  Temp:  [97.5 °F (36.4 °C)-97.8 °F (36.6 °C)]   Pulse:  [69-79]   Resp:  [14-35]   BP: (109-163)/(61-93)   SpO2:  [86 %-100 %]      Shift Events   No acute events throughout shift, HD ordered tonight    AWAKE RASS: Goal - RASS Goal: 0-->alert and calm  Actual - RASS (Osorio Agitation-Sedation Scale): alert and calm    Restraint necessity: Not necessary   BREATHE SBT: Not intubated    Coordinate A & B, analgesics/sedatives Pain: managed   SAT: Not intubated   Delirium CAM-ICU: Overall CAM-ICU: Negative   Early(intubated/ Progressive (non-intubated) Mobility MOVE Screen (INTUBATED ONLY): Pass    Activity: Activity Management: Rolling - L1   Feeding/Nutrition Diet order: Diet/Nutrition Received: mechanical/dental soft, consistent carb/diabetic diet, Specialty Diet/Nutrition Received: renal diet   Thrombus DVT prophylaxis: VTE Required Core Measure: Pharmacological prophylaxis initiated/maintained   HOB Elevation Head of Bed (HOB) Positioning: HOB at 30-45 degrees   Ulcer Prophylaxis GI: yes   Glucose control managed Glycemic Management: blood glucose monitored   Skin Skin assessed during: Daily Assessment    Sacrum intact? Yes  Heels intact? No  Surgical wound? No    [] No Altered Skin Integrity Present    []Prevention Measures Documented    [] Altered Skin Integrity Present or Discovered   [] LDA present /added in EPIC   [] Wound Image Taken     Wound Care Consulted? No    Attending Nurse:  Sandy Lagunas RN/Staff Member:  Rosio Santana RN   Bowel Function diarrhea    Indwelling Catheter Necessity      [REMOVED] Percutaneous Central Line Insertion/Assessment - Triple Lumen  10/11/23 1501 Internal Jugular Left-Line Necessity Review: Hemodynamic instability  na   De-escalation Antibiotics na       VS and assessment per flow sheet, patient progressing towards goals as tolerated, plan of care reviewed with  [unfilled], all concerns addressed, will continue to monitor.

## 2023-10-24 NOTE — ASSESSMENT & PLAN NOTE
ESRD on iHD MWF  [facility]  4 hours  EDW:  71 kg  UMA AVF    -Bedside Hd completed 10/20 , net uf 3 liters removed,  Plan/Recommendations:  -HD session today going, yesterday didn't get HD  -continue strict I/O's  -RFP daily  -renal diet when advanced, 1L fluid restrictions  -Phos WNL, no need for binders at this time    Anemia of chronic kidney disease;    Hemoglobin   Date Value Ref Range Status   10/24/2023 8.6 (L) 14.0 - 18.0 g/dL Final   10/23/2023 7.5 (L) 14.0 - 18.0 g/dL Final   10/22/2023 8.2 (L) 14.0 - 18.0 g/dL Final     Saturated Iron   Date Value Ref Range Status   08/22/2023 19 (L) 20 - 50 % Final   02/23/2023 19 (L) 20 - 50 % Final     Ferritin   Date Value Ref Range Status   08/22/2023 1,455 (H) 20.0 - 300.0 ng/mL Final   02/23/2023 1,803 (H) 20.0 - 300.0 ng/mL Final     Iron   Date Value Ref Range Status   08/22/2023 48 45 - 160 ug/dL Final   02/23/2023 39 (L) 45 - 160 ug/dL Final     TIBC   Date Value Ref Range Status   08/22/2023 249 (L) 250 - 450 ug/dL Final   02/23/2023 203 (L) 250 - 450 ug/dL Final     HB target 10-12    Mineral Bone Disease;    Calcium   Date Value Ref Range Status   10/24/2023 7.2 (L) 8.7 - 10.5 mg/dL Final   10/23/2023 7.2 (L) 8.7 - 10.5 mg/dL Final     Phosphorus   Date Value Ref Range Status   10/24/2023 5.8 (H) 2.7 - 4.5 mg/dL Final   10/23/2023 4.3 2.7 - 4.5 mg/dL Final

## 2023-10-24 NOTE — PROGRESS NOTES
"[facility] - Intensive Care ([facility]-)  Endocrinology  Progress Note    Admit Date: 10/11/2023     Cosme Lewis is a 57yo M with T2DM (on insulin at home), ESRD on HD, COPD, HFrEF, and HTN from [facility] who was brought in to the hospital on 10/11/2023 for evaluation of shortness of breath. Patient was diagnosed and treated for COVID pneumonia and sepsis. On admission patient was hypoglycemic felt to be secondary to administration of insulin at the nursing home. Hospitalization has been further complicated by acute encephalopathy.     Since admission patient has been treated with various steroids, levemir and novolog with some hypoglycemia. He received 10 days of high dose dexamethasone for covid. Last dose of dexamethasone was on 10/21. At time of endocrinology consult pt was getting Novolog moderate dose correction scale. Levemir was discontinued several days ago 2/2 hypoglycemia. Endocrinology is consulted for "Labile BG, request recs for new home regimen"    Since discontinuing levemir BG has been >70, although this AM 70s-80s. Getting a significant amount of SSI due to moderate dose correction being ordered instead of low dose in this pt with ESRD.             Regarding T2DM  LOV with NP Snow Calle 2023  Diagnosed with DM around 4 years ago     Outpatient insulin regimen:  Levemir 8 units twice daily   Humalog 8 units plus correction scale before meals   Add correction scale as needed.  Blood sugar 201 to 250 add 2 units  Blood sugar 251 to 300 add 4 units  Blood sugar 301 to 350 add 6 units  Blood sugar greater than 350 add 8 units         Interval HPI:   Overnight events: BG 130s-350 in last 24h, mostly low 200s o/n. Adding conservative basal and SSI novolog today. Stepped down from the MICU to hospital medicine overnight.  Eatin%  Nausea: No  Hypoglycemia and intervention: No  Fever: No  TPN and/or TF: No  If yes, type of TF/TPN and rate: n/a    BP (!) 159/82 (BP Location: Right " "forearm, Patient Position: Lying)   Pulse (!) 112   Temp 97.4 °F (36.3 °C) (Oral)   Resp 18   Ht 5' 9" (1.753 m)   Wt 70.2 kg (154 lb 12.2 oz)   SpO2 (!) 91%   BMI 22.85 kg/m²     Labs Reviewed and Include    Recent Labs   Lab 10/24/23  0405   *   CALCIUM 7.2*   ALBUMIN 2.1*   PROT 5.7*   *   K 5.8*   CO2 15*   CL 99   BUN 78*   CREATININE 4.8*   ALKPHOS 156*   ALT 24   AST 25   BILITOT 0.8     Lab Results   Component Value Date    WBC 4.98 10/24/2023    HGB 8.6 (L) 10/24/2023    HCT 24.7 (L) 10/24/2023    MCV 88 10/24/2023    PLT 91 (L) 10/24/2023     No results for input(s): "TSH", "FREET4" in the last 168 hours.  Lab Results   Component Value Date    HGBA1C 8.4 (H) 10/12/2023       Nutritional status:   Body mass index is 22.85 kg/m².  Lab Results   Component Value Date    ALBUMIN 2.1 (L) 10/24/2023    ALBUMIN 2.1 (L) 10/23/2023    ALBUMIN 2.2 (L) 10/22/2023     No results found for: "PREALBUMIN"    Estimated Creatinine Clearance: 16.3 mL/min (A) (based on SCr of 4.8 mg/dL (H)).    Accu-Checks  Recent Labs     10/22/23  1744 10/22/23  2134 10/23/23  0036 10/23/23  0619 10/23/23  0807 10/23/23  1154 10/23/23  1646 10/23/23  2216 10/24/23  0836 10/24/23  1131   POCTGLUCOSE 273* 181* 136* 85 78 132* 252* 350* 216* 195*       Current Medications and/or Treatments Impacting Glycemic Control  Immunotherapy:    Immunosuppressants       None          Steroids:   Hormones (From admission, onward)      Start     Stop Route Frequency Ordered    10/23/23 0118  melatonin tablet 9 mg         -- Oral Nightly PRN 10/23/23 0018          Pressors:    Autonomic Drugs (From admission, onward)      None          Hyperglycemia/Diabetes Medications:   Antihyperglycemics (From admission, onward)      Start     Stop Route Frequency Ordered    10/24/23 1000  insulin detemir U-100 (Levemir) pen 3 Units         -- SubQ 2 times daily 10/24/23 0958    10/23/23 1118  insulin aspart U-100 pen 0-5 Units         -- SubQ Before " meals & nightly PRN 10/23/23 1118            ASSESSMENT and PLAN    Pulmonary  * Acute on chronic respiratory failure with hypoxia and hypercapnia  Still requiring supplemental O2  COVID dx on admission -- was on high dose steroids which was increasing insulin requirement, but last dose of dex was 10/21    Renal/  ESRD (end stage renal disease)  ESRD on HD  Cautious with insulin given decreased renal clearance as this is likely the reason for hypoglycemia.    Endocrine  Type 2 diabetes mellitus with renal complication  Endocrinology consulted for BG management.   Inpatient BG goal 140-180    BG mostly low 200s. Will restart low dose basal and conservative correction scale.    - Levemir 3 units BID  - Novolog (Insulin Aspart) low dose SSI prn (180/50) -- correct above 180 instead of 150 given hypoglycemia previously  - He may need low dose levemir added back but for now would monitor on LDC only given severe hypoglycemia earlier this admission   - BG checks AC/HS/0200 or q4h if not eating meals   - Hypoglycemia protocol in place  - If blood glucose greater than 300, please ask patient not to eat food or drink anything other than water until correctional insulin has brought it back below 250    ** Please notify Endocrine for any change and/or advance in diet**  ** Please call Endocrine for any BG related issues **    Discharge Planning:   TBD. Please notify endocrinology prior to discharge.      Hypoglycemia  Suspect hypoglycemia was due to insulin doses in ESRD pt with reduced clearance  He was getting MDI doses gradually reduced -- even after scheduled levemir and novolog were stopped, he continued to get moderate dose correction scale Novolog  In this type of pt especially, would recommend low dose correction scale and not moderate  When he was getting high dose dexamethasone for covid, the mod dose was appropriate, but steroids have been stopped since 10/21 and he no longer warrants aggressive correction  scale  Do not suspect endogenous insulin production in this pt at this time - would hold on off on hypoglycemia w/u unless persistent hypoglycemia without any insulin           Isabel Middleton MD  Endocrinology  [facility] - Intensive Care ([place])

## 2023-10-24 NOTE — PLAN OF CARE
Patient AAOx3, confused to time/date. Continues on 4L n/c with humidification. BM today. Good appetite for meals. Turn q2h, wound care as ordered. HD completed this morning. Fall measures in place. Bed low and locked and call light in reach.     Problem: Adult Inpatient Plan of Care  Goal: Plan of Care Review  Outcome: Ongoing, Progressing  Goal: Patient-Specific Goal (Individualized)  Outcome: Ongoing, Progressing  Goal: Absence of Hospital-Acquired Illness or Injury  Outcome: Ongoing, Progressing  Goal: Optimal Comfort and Wellbeing  Outcome: Ongoing, Progressing  Goal: Readiness for Transition of Care  Outcome: Ongoing, Progressing     Problem: Diabetes Comorbidity  Goal: Blood Glucose Level Within Targeted Range  Outcome: Ongoing, Progressing     Problem: Impaired Wound Healing  Goal: Optimal Wound Healing  Outcome: Ongoing, Progressing     Problem: Infection (Hemodialysis)  Goal: Absence of Infection Signs and Symptoms  Outcome: Ongoing, Progressing     Problem: Fall Injury Risk  Goal: Absence of Fall and Fall-Related Injury  Outcome: Ongoing, Progressing     Problem: Skin Injury Risk Increased  Goal: Skin Health and Integrity  Outcome: Ongoing, Progressing     Problem: Gas Exchange Impaired  Goal: Optimal Gas Exchange  Outcome: Ongoing, Progressing     Problem: Coping Ineffective  Goal: Effective Coping  Outcome: Ongoing, Progressing

## 2023-10-24 NOTE — ASSESSMENT & PLAN NOTE
Patient with Hypoxic Respiratory failure which is Acute.  he is not on home oxygen. Supplemental oxygen was provided and noted.  Signs/symptoms of respiratory failure include- respiratory distress. Contributing diagnoses includes - ARDS Labs and images were reviewed. Patient Has recent ABG, which has been reviewed. Will treat underlying causes and adjust management of respiratory failure as follows- continuing antibiotics and antivirals, adjustments of mechanical ventilation, HD and fluid regulation.      Extubated in ICU to 5L NC morning of 10/13. In no acute distress  VBG 7.2552  However mental status improving  -continue to monitor on NC  -ABG as warranted  -Currently on nasal canula (4L)

## 2023-10-24 NOTE — HPI
Patient is very pleasant, was getting dialysis.  Denies any complaints.  Reports that his shortness of breath is better than it has ever been.  Denies any chest pain or abdominal pain, and reports the only thing that is bothersome is his Sob. He reiterated that it is better than it is ever been

## 2023-10-24 NOTE — SUBJECTIVE & OBJECTIVE
Interval History:   Stepped down from ICU, HD session already started today.    Review of patient's allergies indicates:   Allergen Reactions    Heparin analogues      HIT KARLOS weakly positive, ELISE pending     Current Facility-Administered Medications   Medication Frequency    0.9%  NaCl infusion Once    acetaminophen tablet 650 mg Q6H PRN    apixaban tablet 2.5 mg BID    atorvastatin tablet 40 mg Daily    carvediloL tablet 12.5 mg BID WM    dextrose 10% bolus 125 mL 125 mL PRN    dextrose 10% bolus 250 mL 250 mL PRN    epoetin xavier-epbx injection 3,510 Units Every Mon, Wed, Fri    FLUoxetine capsule 40 mg Daily    fluticasone furoate-vilanteroL 100-25 mcg/dose diskus inhaler 1 puff Daily    glucose chewable tablet 16 g PRN    glucose chewable tablet 24 g PRN    hydrALAZINE injection 10 mg Q6H PRN    insulin aspart U-100 pen 0-5 Units QID (AC + HS) PRN    insulin detemir U-100 (Levemir) pen 3 Units BID    melatonin tablet 9 mg Nightly PRN    multivitamin tablet Daily    NIFEdipine 24 hr tablet 60 mg BID    sodium chloride 0.9% bolus 250 mL 250 mL PRN    sodium chloride 0.9% bolus 250 mL 250 mL PRN    tiotropium bromide 2.5 mcg/actuation inhaler 2 puff Daily       Objective:     Vital Signs (Most Recent):  Temp: 97.5 °F (36.4 °C) (10/24/23 0830)  Pulse: (!) 116 (10/24/23 1100)  Resp: 18 (10/24/23 0414)  BP: (!) 143/68 (10/24/23 1100)  SpO2: 97 % (10/24/23 1100) Vital Signs (24h Range):  Temp:  [97.5 °F (36.4 °C)-98.2 °F (36.8 °C)] 97.5 °F (36.4 °C)  Pulse:  [] 116  Resp:  [15-30] 18  SpO2:  [87 %-100 %] 97 %  BP: (106-160)/(61-92) 143/68     Weight: 70.2 kg (154 lb 12.2 oz) (10/18/23 1100)  Body mass index is 22.85 kg/m².  Body surface area is 1.85 meters squared.    I/O last 3 completed shifts:  In: 550 [P.O.:550]  Out: 1 [Stool:1]     Physical Exam   Vitals and nursing note reviewed.   Constitutional:       General: He is not in acute distress.     Appearance: He is well-developed. He is ill-appearing.       Interventions: Nasal cannula in place.   HENT:      Head: Normocephalic and atraumatic.      Nose: No congestion or rhinorrhea.   Eyes:      General: No scleral icterus.        Right eye: No discharge.         Left eye: No discharge.      Extraocular Movements: Extraocular movements intact.      Conjunctiva/sclera: Conjunctivae normal.   Neck:      Vascular: JVD present.   Cardiovascular:      Rate and Rhythm: Normal rate and regular rhythm.      Heart sounds: No murmur heard.     No friction rub. No gallop.      Comments: UMA AVF  Pulmonary:      Effort: No respiratory distress.      Breath sounds: Rhonchi and rales present. No wheezing.   Abdominal:      General: There is no distension.      Palpations: Abdomen is soft.      Tenderness: There is no abdominal tenderness.   Musculoskeletal:         General: No swelling.      Cervical back: Normal range of motion.      Right lower leg: Edema present.      Left lower leg: Edema present.   Skin:     General: Skin is warm and dry.   Neurological:      Mental Status: He is alert. Mental status is at baseline.  Significant Labs:  BMP:   Recent Labs   Lab 10/24/23  0405   *   *   K 5.8*   CL 99   CO2 15*   BUN 78*   CREATININE 4.8*   CALCIUM 7.2*   MG 2.2     CBC:   Recent Labs   Lab 10/24/23  0405   WBC 4.98   RBC 2.80*   HGB 8.6*   HCT 24.7*   PLT 91*   MCV 88   MCH 30.7   MCHC 34.8     All labs within the past 24 hours have been reviewed.     Significant Imaging:  Labs: Reviewed

## 2023-10-24 NOTE — PLAN OF CARE
ADALGISA phoned patient for DP Reassessment due to patient's airborne/contact isolation status. No answer via phone, SW left VM for f/u.     ADALGISA phoned patient's Mother at [phone] for DP Reassessment. No answer at that time      SW will re-attempt.              BHASKAR Judd, LMSW  [facility]  Case Management  Ext. [phone]

## 2023-10-24 NOTE — SUBJECTIVE & OBJECTIVE
Interval History: see above      Objective:     Vital Signs (Most Recent):  Temp: 97.6 °F (36.4 °C) (10/24/23 1512)  Pulse: 108 (10/24/23 1706)  Resp: 19 (10/24/23 1512)  BP: (!) 120/92 (10/24/23 1512)  SpO2: (!) 94 % (10/24/23 1706) Vital Signs (24h Range):  Temp:  [97.4 °F (36.3 °C)-98.2 °F (36.8 °C)] 97.6 °F (36.4 °C)  Pulse:  [] 108  Resp:  [16-25] 19  SpO2:  [91 %-100 %] 94 %  BP: (106-160)/(62-92) 120/92     Weight: 70.2 kg (154 lb 12.2 oz)  Body mass index is 22.85 kg/m².    Intake/Output Summary (Last 24 hours) at 10/24/2023 1800  Last data filed at 10/24/2023 1701  Gross per 24 hour   Intake 1519.74 ml   Output 3100 ml   Net -1580.26 ml         Physical Exam  Constitutional:       General: He is not in acute distress.     Appearance: Normal appearance. He is not ill-appearing, toxic-appearing or diaphoretic.   HENT:      Head: Normocephalic and atraumatic.      Nose: No congestion or rhinorrhea.   Eyes:      General: No scleral icterus.     Extraocular Movements: Extraocular movements intact.   Cardiovascular:      Rate and Rhythm: Normal rate.      Pulses: Normal pulses.      Heart sounds: Normal heart sounds.   Pulmonary:      Effort: Pulmonary effort is normal. No respiratory distress.      Breath sounds: No stridor. No wheezing, rhonchi or rales.      Comments: Reduced breath sounds bilat.  Chest:      Chest wall: No tenderness.   Abdominal:      General: Abdomen is flat. There is no distension.      Palpations: Abdomen is soft. There is no mass.      Tenderness: There is no abdominal tenderness. There is no guarding.   Genitourinary:     Penis: Normal.    Musculoskeletal:      Cervical back: Normal range of motion.   Skin:     General: Skin is warm and dry.   Neurological:      General: No focal deficit present.      Mental Status: He is alert and oriented to person, place, and time. Mental status is at baseline.   Psychiatric:         Mood and Affect: Mood normal.         Behavior: Behavior  normal.         Thought Content: Thought content normal.         Judgment: Judgment normal.             Significant Labs: All pertinent labs within the past 24 hours have been reviewed.    Significant Imaging: I have reviewed all pertinent imaging results/findings within the past 24 hours.

## 2023-10-24 NOTE — PROGRESS NOTES
[facility] - Intensive Care ([address] Medicine  Progress Note    Patient Name: Cosme Lewis  MRN: [id number]  Patient Class: IP- Inpatient   Admission Date: 10/11/2023  Length of Stay: 13 days  Attending Physician: Wiley Yoon DO  Primary Care Provider: Eliecer Ohara III, MD        Subjective:     Principal Problem:Acute on chronic respiratory failure with hypoxia and hypercapnia        HPI:  Patient is very pleasant, was getting dialysis.  Denies any complaints.  Reports that his shortness of breath is better than it has ever been.  Denies any chest pain or abdominal pain, and reports the only thing that is bothersome is his Sob. He reiterated that it is better than it is ever been      Overview/Hospital Course:  58 year old male with PMH notable for COPD, HFrEF (45% and DD), ESRD on dialysis - MWF, HTN, DM presents via EMS from nursing home for altered mental status and hypoxia.  Per EMS, patient was found to have glucose of 38, he received D50 with improvement in his glucose.  He also had SpO2 58% which improved with 15 L nasal cannula.   ED work up revealed VBG 7.27/61 and he was subsequently placed on bipap for work of breathing. He was persistently hypoglycemic with BG 59 --> 34 despite IV dextrose, therefore he was started on a continuous dextrose infusion. Other lab work up revealed WBC 3, stable, H//H 9.5/29, Na 143, CO2 20, Cr/BUN 3.8/38, elevated AST/ALT/alk phos, and troponin 0.174 with no ischemic changes on EKG. He was given IV rocephin and doxycyline. Chest XR with bilateral opacifications R > L.  Critical care consulted for acute hypoxemic respiratory failure and NIPPV. Found to have COVID pneumonia with overlapping community acquired pneumonia.   ICU Course:  In the MICU, the patient arrived on bipap but was found to still be hypoxic which initially improved after adjusting his bipap settings. He continued to be altered and tried occasionally became combative requiring precedex which  was affective when maxed out at 1.4 mcg/kg/hour. During this time he was also found to have worsening hypoglycemia which required continuous D10 administration at a max rate of 200 cc/hour. Eventually his oxygen status started declining and the decision was made to intubate the patient. It is believed that worsening of his oxygen status was in part due the large volume of D10 he received to maintain his blood sugars. For this reason, it was decided that D20 at a slower rate would be more appropriate, and for this a central line was placed. Additionally, he received a round of hemodialysis. He is receiving remdesivir for COVID and vacomycin/zosyn/doxycycline for community acquired pneumonia coverage/concerning chest x-ray. The patient's sugars started rising on the D20 and eventually he started becoming hyperglycemic  to the 220s which led to the D20 being stopped for the time being and since that time the sugars have been relatively normoglycemic. His abdomen was found to be slightly more distended but KUB was negative for bowel obstruction, likely suggesting ascites as the source.  On 10/12 after passing SAT and SBT, the decision was made to extubate the patient which was done successfully. All sedatives were turned off at this time as well. He was placed on high flow at the time and has been sating well. He continued to receive antibiotics as well as antivirals for CAP coverage/COVID respectively which was able to help improve his likely pneumonia vs ARDS.      Between 10/14 - 10/17 he went back and forth between requiring high flow and bipap, however starting on 10/17 he was able to tolerate nasal canula.   10/18-  No significant overnight events, he states that he feels his breathing has improved. Will be transferred to [facility].    10/24:  Patient is pleasant, no significant events overnight, with receiving dialysis, oxygen demand stable.         Interval History: see above      Objective:     Vital Signs (Most  Recent):  Temp: 97.6 °F (36.4 °C) (10/24/23 1512)  Pulse: 108 (10/24/23 1706)  Resp: 19 (10/24/23 1512)  BP: (!) 120/92 (10/24/23 1512)  SpO2: (!) 94 % (10/24/23 1706) Vital Signs (24h Range):  Temp:  [97.4 °F (36.3 °C)-98.2 °F (36.8 °C)] 97.6 °F (36.4 °C)  Pulse:  [] 108  Resp:  [16-25] 19  SpO2:  [91 %-100 %] 94 %  BP: (106-160)/(62-92) 120/92     Weight: 70.2 kg (154 lb 12.2 oz)  Body mass index is 22.85 kg/m².    Intake/Output Summary (Last 24 hours) at 10/24/2023 1800  Last data filed at 10/24/2023 1701  Gross per 24 hour   Intake 1519.74 ml   Output 3100 ml   Net -1580.26 ml         Physical Exam  Constitutional:       General: He is not in acute distress.     Appearance: Normal appearance. He is not ill-appearing, toxic-appearing or diaphoretic.   HENT:      Head: Normocephalic and atraumatic.      Nose: No congestion or rhinorrhea.   Eyes:      General: No scleral icterus.     Extraocular Movements: Extraocular movements intact.   Cardiovascular:      Rate and Rhythm: Normal rate.      Pulses: Normal pulses.      Heart sounds: Normal heart sounds.   Pulmonary:      Effort: Pulmonary effort is normal. No respiratory distress.      Breath sounds: No stridor. No wheezing, rhonchi or rales.      Comments: Reduced breath sounds bilat.  Chest:      Chest wall: No tenderness.   Abdominal:      General: Abdomen is flat. There is no distension.      Palpations: Abdomen is soft. There is no mass.      Tenderness: There is no abdominal tenderness. There is no guarding.   Genitourinary:     Penis: Normal.    Musculoskeletal:      Cervical back: Normal range of motion.   Skin:     General: Skin is warm and dry.   Neurological:      General: No focal deficit present.      Mental Status: He is alert and oriented to person, place, and time. Mental status is at baseline.   Psychiatric:         Mood and Affect: Mood normal.         Behavior: Behavior normal.         Thought Content: Thought content normal.          Judgment: Judgment normal.             Significant Labs: All pertinent labs within the past 24 hours have been reviewed.    Significant Imaging: I have reviewed all pertinent imaging results/findings within the past 24 hours.      Assessment/Plan:      * Acute on chronic respiratory failure with hypoxia and hypercapnia  Patient with Hypercapnic and Hypoxic Respiratory failure which is Chronic.  he is not on home oxygen. Supplemental oxygen was provided and noted-  Signs/symptoms of respiratory failure include- tachypnea, increased work of breathing and use of accessory muscles. Contributing diagnoses includes - CHF, COPD and fluid volume excess Labs and images were reviewed. Patient Has recent ABG, which has been reviewed. Will treat underlying causes and adjust management of respiratory failure as follows-      58 year old male with multiple medical problems who presented to [facility] ED via EMS for AMS, hypoglycemia, and hypoxia placed on NIPPV. Per chart review patient with room air saturation in 50s which improved with HFNC, however, he remained tachypnic with accessory muscle use, therefore he was placed on NIPPV. Unclear if patient requires oxygen at baseline. Previous hospitalizations with oxygen use. CXR with bilateral opacifications R > L and BNP elevated > 4900.      Admitted to ICU, Intubated and mechanically ventilated, extubated to NC 10/13, sating well  Chest x-ray concerning for pneumonia vs ARDS     - Monitoring ABGs PRN  - Treated for COVID-19 pneumonia: Remdesivir completed. Dexamethasone 6 mg daily  - CAP coverage with Vanc/Zosyn completed   - Received HD 10/11, 10/12, 10/13, 10/15 and 10/16.   - Duoneb nebulizer, tiotropium and Fluticasone furoate / Vilanterol     Acute respiratory failure with hypoxia and hypercapnia  Patient with Hypoxic Respiratory failure which is Acute.  he is not on home oxygen. Supplemental oxygen was provided and noted.  Signs/symptoms of respiratory failure include-  respiratory distress. Contributing diagnoses includes - ARDS Labs and images were reviewed. Patient Has recent ABG, which has been reviewed. Will treat underlying causes and adjust management of respiratory failure as follows- continuing antibiotics and antivirals, adjustments of mechanical ventilation, HD and fluid regulation.      Extubated in ICU to 5L NC morning of 10/13. In no acute distress  VBG 7.2552  However mental status improving  -continue to monitor on NC  -ABG as warranted  -Currently on nasal canula (4L)         Type 2 diabetes mellitus with renal complication  Recent Labs     10/16/23  2007 10/16/23  2307 10/17/23  0723 10/17/23  1140 10/17/23  1547 10/18/23  0907   POCTGLUCOSE 155* 142* 134* 183* 172* 188*     On low dose SS  Hypoglycemia protocol      History of pulmonary embolism  Diagnosed 10/2022  Repeat CT 3/2023 with resolution     -hold home eliquis for mental status change  -heparin stopped as patient's platelet level fell to 35,000    Hypoglycemia  S/p IV d 20 drip in ICU  Recent Labs     10/16/23  2007 10/16/23  2307 10/17/23  0723 10/17/23  1140 10/17/23  1547 10/18/23  0907   POCTGLUCOSE 155* 142* 134* 183* 172* 188*     -Currently on sliding scale insulin and insulin aspart with meals   RESOLVED      COPD (chronic obstructive pulmonary disease)  --Duo nebs Q4wake  --Currently on nasal canula  --Fluticasone furoate / Vilanterol and Salmeterol     HLD (hyperlipidemia)   LFT's have improved, will restart atorvastatin 40 mg daily      Anemia in ESRD (end-stage renal disease)        Recent Labs   Lab 10/18/23  0439   WBC 2.82*   RBC 2.66*   HGB 7.8*   HCT 23.3*   PLT 39*   MCV 88   MCH 29.3   MCHC 33.5      -- CBC daily and trend   -- Transfuse for hgb < 7   -- Receives EPO with iHD    Chronic diastolic heart failure    ECHO 09/23:        Left Ventricle: The left ventricle is normal in size. Increased wall thickness. There is concentric hypertrophy. Mild global hypokinesis present. There  is mildly reduced systolic function with a visually estimated ejection fraction of 40 - 45%. There is indeterminate diastolic function.    Right Ventricle: Normal right ventricular cavity size. Right ventricle wall motion  is normal. Systolic function is borderline low.    Left Atrium: Left atrium is severely dilated.    Tricuspid Valve: There is mild regurgitation.    Pericardium: There is a trivial effusion. Echodensity seen adjacent to the visceral pericardium of uncertain significance. This may be pericardial fat although other etiologies not excluded. Correlate clinically.    PASP is at least 46 mmHg.     BNP > 4900     --Volume removal per iHD. Appreciate nephrology assistance   --Blood pressure started becoming elevated on 10/17. Home nifedipine and lisinopril restarted as pressures have been within normal limits. Hydralazine pushes prn should pressures rise.     ESRD (end stage renal disease)  Nephrology following  Outpatient HD unit: [facility]   HD tx days: MWF   HD tx time: 4hrs   HD access: LUE AVF   HD modality: iHD   Residual urine: Anuric   Estimated Creatinine Clearance: 33.8 mL/min (A) (based on SCr of 2.3 mg/dL (H)).   Toelrating iHD well     -- Nephrology consulted for iHD management. Appreciate their assistance   -- CMP daily and trend   -- Avoid nephrotoxins   -- Renally dose all medications to appropriate GFR / CrCl   -- Hgb > 7 and MAP > 65 goals       VTE Risk Mitigation (From admission, onward)         Ordered     apixaban tablet 2.5 mg  2 times daily         10/20/23 1119     IP VTE HIGH RISK PATIENT  Once         10/11/23 0248     Place sequential compression device  Until discontinued         10/11/23 0248                Discharge Planning   GERA: 10/27/2023     Code Status: Full Code   Is the patient medically ready for discharge?:     Reason for patient still in hospital (select all that apply): Patient trending condition and Treatment  Discharge Plan A: Return to nursing  home   Discharge Delays: None known at this time              Wilye Yoon DO  Department of Hospital Medicine   [facility] - Intensive Care ([place]-16)

## 2023-10-24 NOTE — PLAN OF CARE
Problem: Fall Injury Risk  Goal: Absence of Fall and Fall-Related Injury  10/24/2023 0448 by Kandis Mendoza RN  Outcome: Ongoing, Progressing  10/24/2023 0448 by Kandis Mendoza RN  Outcome: Ongoing, Progressing     Problem: Infection  Goal: Absence of Infection Signs and Symptoms  10/24/2023 0448 by Kandis Mendoza RN  Outcome: Ongoing, Progressing  10/24/2023 0448 by Kandis Mendoza RN  Outcome: Ongoing, Progressing     Problem: Gas Exchange Impaired  Goal: Optimal Gas Exchange  Outcome: Ongoing, Progressing

## 2023-10-24 NOTE — PROGRESS NOTES
Bedside nurse Roya informed of dialysis initiation.    1:1 Bedside HD treatment started as ordered.  VS's per Dialysis Flowsheet.    Dialysis Access: UMA AVF    Needle Size: 15 gauge X2  Insertion with no complications.    Will maintain telemetry and blood pressure monitoring throughout treatment.  Refer to dialysis flowsheet and MAR for details.

## 2023-10-24 NOTE — NURSING TRANSFER
Nursing Transfer Note      10/23/2023   11:54 PM    Nurse giving handoff:Nixon  Nurse receiving handoff:Kandis    Reason patient is being transferred: Stepdown    Transfer To: [id number]    Transfer via bed    Transfer with  to O2, cardiac monitoring    Transported by PAT Davidson and ADELITA Brewer    Transfer Vital Signs:  Blood Pressure:136/66  Heart Rate:98  O2:98    Temperature:  Respirations:18    Telemetry:   Order for Tele Monitor? No    Additional Lines: Oxygen    4eyes on Skin: yes    Medicines sent:     Any special needs or follow-up needed: NO    Patient belongings transferred with patient: Yes    Chart send with patient: Yes    Notified:     Patient reassessed at: 2345 (date, time)  1  Upon arrival to floor: cardiac monitor applied

## 2023-10-24 NOTE — PROGRESS NOTES
Bedside hemodialysis treatment completed.    Treatment time received: 3 hours    Net fluid removed: 2.5 liters    Tolerated Treatment well. VSS. No acute distress.    Blood returned and needles X2 removed. Pressure held till hemostasis obtained.  Placed gauze and tape dressing to site.  Fistual with continued bruit and thrill post treatment.    Update given to bedside nurse Roya.  Refer to dialysis flowsheet and MAR for details.

## 2023-10-24 NOTE — SUBJECTIVE & OBJECTIVE
"Interval HPI:   Overnight events: BG 130s-350 in last 24h, mostly low 200s o/n. Adding conservative basal and SSI novolog today. Stepped down from the MICU to hospital medicine overnight.  Eatin%  Nausea: No  Hypoglycemia and intervention: No  Fever: No  TPN and/or TF: No  If yes, type of TF/TPN and rate: n/a    BP (!) 159/82 (BP Location: Right forearm, Patient Position: Lying)   Pulse (!) 112   Temp 97.4 °F (36.3 °C) (Oral)   Resp 18   Ht 5' 9" (1.753 m)   Wt 70.2 kg (154 lb 12.2 oz)   SpO2 (!) 91%   BMI 22.85 kg/m²     Labs Reviewed and Include    Recent Labs   Lab 10/24/23  0405   *   CALCIUM 7.2*   ALBUMIN 2.1*   PROT 5.7*   *   K 5.8*   CO2 15*   CL 99   BUN 78*   CREATININE 4.8*   ALKPHOS 156*   ALT 24   AST 25   BILITOT 0.8     Lab Results   Component Value Date    WBC 4.98 10/24/2023    HGB 8.6 (L) 10/24/2023    HCT 24.7 (L) 10/24/2023    MCV 88 10/24/2023    PLT 91 (L) 10/24/2023     No results for input(s): "TSH", "FREET4" in the last 168 hours.  Lab Results   Component Value Date    HGBA1C 8.4 (H) 10/12/2023       Nutritional status:   Body mass index is 22.85 kg/m².  Lab Results   Component Value Date    ALBUMIN 2.1 (L) 10/24/2023    ALBUMIN 2.1 (L) 10/23/2023    ALBUMIN 2.2 (L) 10/22/2023     No results found for: "PREALBUMIN"    Estimated Creatinine Clearance: 16.3 mL/min (A) (based on SCr of 4.8 mg/dL (H)).    Accu-Checks  Recent Labs     10/22/23  1744 10/22/23  2134 10/23/23  0036 10/23/23  0619 10/23/23  0807 10/23/23  1154 10/23/23  1646 10/23/23  2216 10/24/23  0836 10/24/23  1131   POCTGLUCOSE 273* 181* 136* 85 78 132* 252* 350* 216* 195*       Current Medications and/or Treatments Impacting Glycemic Control  Immunotherapy:    Immunosuppressants       None          Steroids:   Hormones (From admission, onward)      Start     Stop Route Frequency Ordered    10/23/23 0118  melatonin tablet 9 mg         -- Oral Nightly PRN 10/23/23 0018          Pressors:    Autonomic " Drugs (From admission, onward)      None          Hyperglycemia/Diabetes Medications:   Antihyperglycemics (From admission, onward)      Start     Stop Route Frequency Ordered    10/24/23 1000  insulin detemir U-100 (Levemir) pen 3 Units         -- SubQ 2 times daily 10/24/23 0958    10/23/23 1118  insulin aspart U-100 pen 0-5 Units         -- SubQ Before meals & nightly PRN 10/23/23 1117

## 2023-10-24 NOTE — ASSESSMENT & PLAN NOTE
Endocrinology consulted for BG management.   Inpatient BG goal 140-180    BG mostly low 200s. Will restart low dose basal and conservative correction scale.    - Levemir 3 units BID  - Novolog (Insulin Aspart) low dose SSI prn (180/50) -- correct above 180 instead of 150 given hypoglycemia previously  - He may need low dose levemir added back but for now would monitor on LDC only given severe hypoglycemia earlier this admission   - BG checks AC/HS/0200 or q4h if not eating meals   - Hypoglycemia protocol in place  - If blood glucose greater than 300, please ask patient not to eat food or drink anything other than water until correctional insulin has brought it back below 250    ** Please notify Endocrine for any change and/or advance in diet**  ** Please call Endocrine for any BG related issues **    Discharge Planning:   TBD. Please notify endocrinology prior to discharge.

## 2023-10-25 NOTE — ASSESSMENT & PLAN NOTE
[facility]  [facility]  EDW:  71 kg  UMA AVF      Plan/Recommendations:  -no further HD requirements today, will plan for HD tomorrow.   -continue strict I/O's  -RFP daily  -renal diet when advanced, 1L fluid restrictions  -Phos WNL, no need for binders at this time    Anemia of chronic kidney disease;    Hemoglobin   Date Value Ref Range Status   10/25/2023 8.3 (L) 14.0 - 18.0 g/dL Final   10/24/2023 8.6 (L) 14.0 - 18.0 g/dL Final   10/23/2023 7.5 (L) 14.0 - 18.0 g/dL Final     Saturated Iron   Date Value Ref Range Status   08/22/2023 19 (L) 20 - 50 % Final   02/23/2023 19 (L) 20 - 50 % Final     Ferritin   Date Value Ref Range Status   08/22/2023 1,455 (H) 20.0 - 300.0 ng/mL Final   02/23/2023 1,803 (H) 20.0 - 300.0 ng/mL Final     Iron   Date Value Ref Range Status   08/22/2023 48 45 - 160 ug/dL Final   02/23/2023 39 (L) 45 - 160 ug/dL Final     TIBC   Date Value Ref Range Status   08/22/2023 249 (L) 250 - 450 ug/dL Final   02/23/2023 203 (L) 250 - 450 ug/dL Final     HB target 10-12  Epogen with HD    Mineral Bone Disease    Calcium   Date Value Ref Range Status   10/25/2023 7.4 (L) 8.7 - 10.5 mg/dL Final   10/24/2023 7.2 (L) 8.7 - 10.5 mg/dL Final     Phosphorus   Date Value Ref Range Status   10/25/2023 4.8 (H) 2.7 - 4.5 mg/dL Final   10/24/2023 5.8 (H) 2.7 - 4.5 mg/dL Final      Continue renal diet  Phos 4.8, no binders

## 2023-10-25 NOTE — ASSESSMENT & PLAN NOTE
Endocrinology consulted for BG management.   Inpatient BG goal 140-180    BG highly variable. Initially increased basal and prandial due to hyperglycemia to 300s, but pt was hypoglycemia this afternoon to the 50s primarily attributable to prandial insulin. Lowering back to previous doses. Will tolerate intermittent highs due to propensity for hypoglycemia. When making adjustments in doses will up-titrate by 1u at a time.    - Levemir 3 units BID  - Novolog (Insulin Aspart) 5 units tid before meals and low dose SSI prn (180/50) -- correct above 180 instead of 150 given hypoglycemia previously  - He may need low dose levemir added back but for now would monitor on LDC only given severe hypoglycemia earlier this admission   - BG checks AC/HS/0200 or q4h if not eating meals   - Hypoglycemia protocol in place  - If blood glucose greater than 300, please ask patient not to eat food or drink anything other than water until correctional insulin has brought it back below 250  - Minimize between meal snacking if able    ** Please notify Endocrine for any change and/or advance in diet**  ** Please call Endocrine for any BG related issues **    Discharge Planning:   TBD. Please notify endocrinology prior to discharge.

## 2023-10-25 NOTE — PROGRESS NOTES
"[facility] - [facility] ([place] [address])  Endocrinology  Progress Note    Admit Date: 10/11/2023     Cosme Lewis is a 59yo M with T2DM (on insulin at home), ESRD on HD, COPD, HFrEF, and HTN from [facility] who was brought in to the hospital on 10/11/2023 for evaluation of shortness of breath. Patient was diagnosed and treated for COVID pneumonia and sepsis. On admission patient was hypoglycemic felt to be secondary to administration of insulin at the nursing home. Hospitalization has been further complicated by acute encephalopathy.     Since admission patient has been treated with various steroids, levemir and novolog with some hypoglycemia. He received 10 days of high dose dexamethasone for covid. Last dose of dexamethasone was on 10/21. At time of endocrinology consult pt was getting Novolog moderate dose correction scale. Levemir was discontinued several days ago 2/2 hypoglycemia. Endocrinology is consulted for "Labile BG, request recs for new home regimen"    Since discontinuing levemir BG has been >70, although this AM 70s-80s. Getting a significant amount of SSI due to moderate dose correction being ordered instead of low dose in this pt with ESRD.             Regarding T2DM  LOV with NP Snow Calle 2023  Diagnosed with DM around 4 years ago     Outpatient insulin regimen:  Levemir 8 units twice daily   Humalog 8 units plus correction scale before meals   Add correction scale as needed.  Blood sugar 201 to 250 add 2 units  Blood sugar 251 to 300 add 4 units  Blood sugar 301 to 350 add 6 units  Blood sugar greater than 350 add 8 units         Interval HPI:   Overnight events: Frequent snacking between meals. BG highly variable. Low this afternoon likely due to prandial insulin increases earlier today -- lowering doses.   Eatin%  Nausea: No  Hypoglycemia and intervention: Yes  Fever: No  TPN and/or TF: No  If yes, type of TF/TPN and rate: n/a    /83 (BP Location: Right arm, " "Patient Position: Lying)   Pulse 86   Temp 97.3 °F (36.3 °C)   Resp 20   Ht 5' 9" (1.753 m)   Wt 68.2 kg (150 lb 5.7 oz)   SpO2 (!) 91%   BMI 22.20 kg/m²     Labs Reviewed and Include    Recent Labs   Lab 10/25/23  0423   *   CALCIUM 7.4*   ALBUMIN 2.1*   PROT 5.6*      K 4.6   CO2 26   CL 98   BUN 51*   CREATININE 3.8*   ALKPHOS 163*   ALT 22   AST 16   BILITOT 0.8     Lab Results   Component Value Date    WBC 4.26 10/25/2023    HGB 8.3 (L) 10/25/2023    HCT 25.8 (L) 10/25/2023    MCV 93 10/25/2023     10/25/2023     No results for input(s): "TSH", "FREET4" in the last 168 hours.  Lab Results   Component Value Date    HGBA1C 8.4 (H) 10/12/2023       Nutritional status:   Body mass index is 22.2 kg/m².  Lab Results   Component Value Date    ALBUMIN 2.1 (L) 10/25/2023    ALBUMIN 2.1 (L) 10/24/2023    ALBUMIN 2.1 (L) 10/23/2023     No results found for: "PREALBUMIN"    Estimated Creatinine Clearance: 19.9 mL/min (A) (based on SCr of 3.8 mg/dL (H)).    Accu-Checks  Recent Labs     10/24/23  1131 10/24/23  1600 10/24/23  2005 10/25/23  0811 10/25/23  1244 10/25/23  1301 10/25/23  1306 10/25/23  1308 10/25/23  1347 10/25/23  1510   POCTGLUCOSE 195* 323* 357* 143* 55* 52* 50* 50* 73 124*       Current Medications and/or Treatments Impacting Glycemic Control  Immunotherapy:    Immunosuppressants       None          Steroids:   Hormones (From admission, onward)      Start     Stop Route Frequency Ordered    10/23/23 0118  melatonin tablet 9 mg         -- Oral Nightly PRN 10/23/23 0018          Pressors:    Autonomic Drugs (From admission, onward)      None          Hyperglycemia/Diabetes Medications:   Antihyperglycemics (From admission, onward)      Start     Stop Route Frequency Ordered    10/25/23 2100  insulin detemir U-100 (Levemir) pen 3 Units         -- SubQ 2 times daily 10/25/23 1340    10/25/23 1645  insulin aspart U-100 pen 5 Units         -- SubQ 3 times daily with meals 10/25/23 1338 "    10/23/23 1118  insulin aspart U-100 pen 0-5 Units         -- SubQ Before meals & nightly PRN 10/23/23 1118            ASSESSMENT and PLAN    Pulmonary  * Acute on chronic respiratory failure with hypoxia and hypercapnia  Still requiring supplemental O2  COVID dx on admission -- was on high dose steroids which was increasing insulin requirement, but last dose of dex was 10/21    Renal/  ESRD (end stage renal disease)  ESRD on HD  Cautious with insulin given decreased renal clearance as this is likely the reason for hypoglycemia.    Endocrine  Type 2 diabetes mellitus with renal complication  Endocrinology consulted for BG management.   Inpatient BG goal 140-180    BG highly variable. Initially increased basal and prandial due to hyperglycemia to 300s, but pt was hypoglycemia this afternoon to the 50s primarily attributable to prandial insulin. Lowering back to previous doses. Will tolerate intermittent highs due to propensity for hypoglycemia. When making adjustments in doses will up-titrate by 1u at a time.    - Levemir 3 units BID  - Novolog (Insulin Aspart) 5 units tid before meals and low dose SSI prn (180/50) -- correct above 180 instead of 150 given hypoglycemia previously  - He may need low dose levemir added back but for now would monitor on LDC only given severe hypoglycemia earlier this admission   - BG checks AC/HS/0200 or q4h if not eating meals   - Hypoglycemia protocol in place  - If blood glucose greater than 300, please ask patient not to eat food or drink anything other than water until correctional insulin has brought it back below 250  - Minimize between meal snacking if able    ** Please notify Endocrine for any change and/or advance in diet**  ** Please call Endocrine for any BG related issues **    Discharge Planning:   TBD. Please notify endocrinology prior to discharge.      Hypoglycemia  Suspect hypoglycemia was due to insulin doses in ESRD pt with reduced clearance  He was getting MDI  doses gradually reduced -- even after scheduled levemir and novolog were stopped, he continued to get moderate dose correction scale Novolog  In this type of pt especially, would recommend low dose correction scale and not moderate  When he was getting high dose dexamethasone for covid, the mod dose was appropriate, but steroids have been stopped since 10/21 and he no longer warrants aggressive correction scale  Hypoglycemia again 10/25 due to up-titration in prandial insulin. Will make very small changes in insulin doses only and will tolerate intermittent hyperglycemia within reason due to propensity for hypoglycemia.  Do not suspect endogenous insulin production in this pt at this time - would hold on off on hypoglycemia w/u unless persistent hypoglycemia without any insulin           Isabel Middleton MD  Endocrinology  [facility] - [facility] ([facility]-)

## 2023-10-25 NOTE — PLAN OF CARE
Problem: Adult Inpatient Plan of Care  Goal: Plan of Care Review  Outcome: Ongoing, Progressing  Goal: Patient-Specific Goal (Individualized)  Outcome: Ongoing, Progressing  Goal: Absence of Hospital-Acquired Illness or Injury  Outcome: Ongoing, Progressing  Goal: Optimal Comfort and Wellbeing  Outcome: Ongoing, Progressing  Goal: Readiness for Transition of Care  Outcome: Ongoing, Progressing     Problem: Diabetes Comorbidity  Goal: Blood Glucose Level Within Targeted Range  Outcome: Ongoing, Progressing     Problem: Impaired Wound Healing  Goal: Optimal Wound Healing  Outcome: Ongoing, Progressing     Problem: Hemodynamic Instability (Hemodialysis)  Goal: Effective Tissue Perfusion  Outcome: Ongoing, Progressing     Problem: Infection (Hemodialysis)  Goal: Absence of Infection Signs and Symptoms  Outcome: Ongoing, Progressing     Problem: Fall Injury Risk  Goal: Absence of Fall and Fall-Related Injury  Outcome: Ongoing, Progressing     Problem: Skin Injury Risk Increased  Goal: Skin Health and Integrity  Outcome: Ongoing, Progressing     Problem: Gas Exchange Impaired  Goal: Optimal Gas Exchange  Outcome: Ongoing, Progressing     Problem: Coping Ineffective  Goal: Effective Coping  Outcome: Ongoing, Progressing

## 2023-10-25 NOTE — RESPIRATORY THERAPY
"RAPID RESPONSE RESPIRATORY THERAPY PROACTIVE ROUNDING NOTE             Time of visit: 0804     Code Status: Full Code   : 1965  Bed: 35761/60236 A:   MRN: [id number]  Time spent at the bedside: < 15 min    SITUATION    Evaluated patient for: HFNC Compliance     BACKGROUND    Patient has a past medical history of CHF (congestive heart failure), Chronic kidney disease-mineral and bone disorder, Chronic respiratory failure, COPD (chronic obstructive pulmonary disease), Diabetes mellitus type 1, ESRD on dialysis, Hypertension, Hypervolemia, Hypoglycemia, Hyponatremia, Hyponatremia, Other insomnia, Recurrent major depression, SOB (shortness of breath), and Unspecified lack of coordination.    24 Hours Vitals Range:  Temp:  [97.4 °F (36.3 °C)-98.7 °F (37.1 °C)]   Pulse:  []   Resp:  [18-20]   BP: (117-159)/(66-92)   SpO2:  [91 %-100 %]     Labs:    Recent Labs     10/23/23  0314 10/24/23  0405 10/25/23  0423   * 130* 136   K 4.8 5.8* 4.6    99 98   CO2 20* 15* 26   BUN 60* 78* 51*   CREATININE 3.9* 4.8* 3.8*   GLU 89 219* 196*   PHOS 4.3 5.8* 4.8*   MG 2.0 2.2 2.0        No results for input(s): "PH", "PCO2", "PO2", "HCO3", "POCSATURATED", "BE" in the last 72 hours.    ASSESSMENT/INTERVENTIONS    Pt resting comfortably with no respiratory needs at this time.    Last VS   Temp: 97.6 °F (36.4 °C) (10/25 0813)  Pulse: 108 (10/25 0813)  Resp: 19 (10/25 0813)  BP: 131/91 (10/25 0813)  SpO2: 97 % (10/25 0813)    Level of Consciousness: Level of Consciousness (AVPU): alert  Respiratory Effort: Respiratory Effort: Unlabored Expansion/Accessory Muscle Usage: Expansion/Accessory Muscles/Retractions: no use of accessory muscles, no retractions, expansion symmetric  All Lung Field Breath Sounds: All Lung Fields Breath Sounds: diminished  O2 Device/Concentration: 3L N.C.  Was the O2 device able to be weaned? Yes  Ambu at bedside:      Active Orders   Respiratory Care    Bipap QHS     Frequency: QHS     Number of " Occurrences: Until Specified     Order Questions:      Mode Bilevel      FiO2% 65      Inspiratory pressure: 18      Expiratory pressure: 8    Oxygen Continuous     Frequency: Continuous     Number of Occurrences: Until Specified     Order Questions:      Device type: High flow      Device: High Flow Nasal Cannula (6 -15 Liters)      LPM: 6-15      Titrate O2 per Oxygen Titration Protocol: Yes      To maintain SpO2 goal of: >= 90%      Notify MD of: Inability to achieve desired SpO2; Sudden change in patient status and requires 20% increase in FiO2; Patient requires >60% FiO2    Pulse Oximetry Continuous     Frequency: Continuous     Number of Occurrences: Until Specified       RECOMMENDATIONS    We recommend: RRT Recs: Continue POC per primary team.      FOLLOW-UP    Please call back the Rapid Response RT, Pito Mejias RRT at x [phone] for any questions or concerns.

## 2023-10-25 NOTE — PROGRESS NOTES
[facility] - [facility] ([place] [address])  Nephrology  Progress Note    Patient Name: Cosme Lewis  MRN: [id number]  Admission Date: 10/11/2023  Hospital Length of Stay: 14 days  Attending Provider: Wiley Yoon DO   Primary Care Physician: Eliecer Ohara III, MD  Principal Problem:Acute on chronic respiratory failure with hypoxia and hypercapnia    Subjective:     Interval History:   HD completed on yesterday with 2.5 L removed. No distress on exam today. CV-19 precautions maintained.     Review of patient's allergies indicates:  No Active Allergies  Current Facility-Administered Medications   Medication Frequency    [START ON 10/26/2023] 0.9%  NaCl infusion Once    acetaminophen tablet 650 mg Q6H PRN    apixaban tablet 2.5 mg BID    atorvastatin tablet 40 mg Daily    carvediloL tablet 12.5 mg BID WM    dextrose 10% bolus 125 mL 125 mL PRN    dextrose 10% bolus 250 mL 250 mL PRN    epoetin xavier-epbx injection 3,510 Units Every Mon, Wed, Fri    FLUoxetine capsule 40 mg Daily    fluticasone furoate-vilanteroL 100-25 mcg/dose diskus inhaler 1 puff Daily    glucose chewable tablet 16 g PRN    glucose chewable tablet 24 g PRN    hydrALAZINE injection 10 mg Q6H PRN    insulin aspart U-100 pen 0-5 Units QID (AC + HS) PRN    insulin aspart U-100 pen 7 Units TIDWM    insulin detemir U-100 (Levemir) pen 5 Units BID    melatonin tablet 9 mg Nightly PRN    multivitamin tablet Daily    NIFEdipine 24 hr tablet 60 mg BID    sodium chloride 0.9% bolus 250 mL 250 mL PRN    sodium chloride 0.9% bolus 250 mL 250 mL PRN    tiotropium bromide 2.5 mcg/actuation inhaler 2 puff Daily       Objective:     Vital Signs (Most Recent):  Temp: 97.3 °F (36.3 °C) (10/25/23 1301)  Pulse: 84 (10/25/23 1122)  Resp: 20 (10/25/23 1301)  BP: 126/83 (10/25/23 1301)  SpO2: (!) 92 % (10/25/23 1301) Vital Signs (24h Range):  Temp:  [97.3 °F (36.3 °C)-98.7 °F (37.1 °C)] 97.3 °F (36.3 °C)  Pulse:  [] 84  Resp:  [18-20] 20  SpO2:   [91 %-97 %] 92 %  BP: (117-138)/(66-92) 126/83     Weight: 68.2 kg (150 lb 5.7 oz) (10/25/23 0400)  Body mass index is 22.2 kg/m².  Body surface area is 1.82 meters squared.    I/O last 3 completed shifts:  In: 1819.7 [P.O.:1220; I.V.:299.7; Other:300]  Out: 3100 [Other:3100]     Physical Exam  Vitals and nursing note reviewed.   Constitutional:       General: He is not in acute distress.     Appearance: Normal appearance. He is not ill-appearing or toxic-appearing.   HENT:      Head: Normocephalic and atraumatic.      Nose: No congestion or rhinorrhea.   Eyes:      General: No scleral icterus.     Extraocular Movements: Extraocular movements intact.   Cardiovascular:      Rate and Rhythm: Normal rate.   Pulmonary:      Effort: Pulmonary effort is normal. No respiratory distress.   Abdominal:      General: Abdomen is flat.   Genitourinary:     Penis: Normal.    Musculoskeletal:      Cervical back: Normal range of motion.   Skin:     General: Skin is warm and dry.   Neurological:      General: No focal deficit present.      Mental Status: He is alert and oriented to person, place, and time.   Psychiatric:         Mood and Affect: Mood normal.         Behavior: Behavior normal.          Significant Labs:  CBC:   Recent Labs   Lab 10/25/23  0423   WBC 4.26   RBC 2.79*   HGB 8.3*   HCT 25.8*      MCV 93   MCH 29.7   MCHC 32.2     CMP:   Recent Labs   Lab 10/25/23  0423   *   CALCIUM 7.4*   ALBUMIN 2.1*   PROT 5.6*      K 4.6   CO2 26   CL 98   BUN 51*   CREATININE 3.8*   ALKPHOS 163*   ALT 22   AST 16   BILITOT 0.8     All labs within the past 24 hours have been reviewed.       Assessment/Plan:     Renal/  ESRD (end stage renal disease)  ESRD on [facility]HD [facility]  4 hours  EDW:  71 kg  UMA AVF      Plan/Recommendations:  -no further HD requirements today, will plan for HD tomorrow.   -continue strict I/O's  -RFP daily  -renal diet when advanced, 1L fluid restrictions  -Phos WNL, no  need for binders at this time    Anemia of chronic kidney disease;    Hemoglobin   Date Value Ref Range Status   10/25/2023 8.3 (L) 14.0 - 18.0 g/dL Final   10/24/2023 8.6 (L) 14.0 - 18.0 g/dL Final   10/23/2023 7.5 (L) 14.0 - 18.0 g/dL Final     Saturated Iron   Date Value Ref Range Status   08/22/2023 19 (L) 20 - 50 % Final   02/23/2023 19 (L) 20 - 50 % Final     Ferritin   Date Value Ref Range Status   08/22/2023 1,455 (H) 20.0 - 300.0 ng/mL Final   02/23/2023 1,803 (H) 20.0 - 300.0 ng/mL Final     Iron   Date Value Ref Range Status   08/22/2023 48 45 - 160 ug/dL Final   02/23/2023 39 (L) 45 - 160 ug/dL Final     TIBC   Date Value Ref Range Status   08/22/2023 249 (L) 250 - 450 ug/dL Final   02/23/2023 203 (L) 250 - 450 ug/dL Final     HB target 10-12  Epogen with HD    Mineral Bone Disease    Calcium   Date Value Ref Range Status   10/25/2023 7.4 (L) 8.7 - 10.5 mg/dL Final   10/24/2023 7.2 (L) 8.7 - 10.5 mg/dL Final     Phosphorus   Date Value Ref Range Status   10/25/2023 4.8 (H) 2.7 - 4.5 mg/dL Final   10/24/2023 5.8 (H) 2.7 - 4.5 mg/dL Final      Continue renal diet  Phos 4.8, no binders         Thank you for your consult. I will follow-up with patient. Please contact us if you have any additional questions.    Rosi Grant DNP, FNP-C  Nephrology  [facility] - Intensive Care ([facility]-)

## 2023-10-25 NOTE — NURSING
Glucose 55 for lunch, patient asymptomatic, recheck 52. Glucose tabs given. Patient eating lunch as well. Will recheck. MD made aware.     1349- repeat glucose 73 after interventions. Patient awake and alert, snack given.

## 2023-10-25 NOTE — PLAN OF CARE
Problem: Fall Injury Risk  Goal: Absence of Fall and Fall-Related Injury  Outcome: Ongoing, Progressing     Problem: Gas Exchange Impaired  Goal: Optimal Gas Exchange  Outcome: Ongoing, Progressing

## 2023-10-25 NOTE — SUBJECTIVE & OBJECTIVE
Interval History:   HD completed on yesterday with 2.5 L removed. No distress on exam today. CV-19 precautions maintained.     Review of patient's allergies indicates:  No Active Allergies  Current Facility-Administered Medications   Medication Frequency    [START ON 10/26/2023] 0.9%  NaCl infusion Once    acetaminophen tablet 650 mg Q6H PRN    apixaban tablet 2.5 mg BID    atorvastatin tablet 40 mg Daily    carvediloL tablet 12.5 mg BID WM    dextrose 10% bolus 125 mL 125 mL PRN    dextrose 10% bolus 250 mL 250 mL PRN    epoetin xavier-epbx injection 3,510 Units Every Mon, Wed, Fri    FLUoxetine capsule 40 mg Daily    fluticasone furoate-vilanteroL 100-25 mcg/dose diskus inhaler 1 puff Daily    glucose chewable tablet 16 g PRN    glucose chewable tablet 24 g PRN    hydrALAZINE injection 10 mg Q6H PRN    insulin aspart U-100 pen 0-5 Units QID (AC + HS) PRN    insulin aspart U-100 pen 7 Units TIDWM    insulin detemir U-100 (Levemir) pen 5 Units BID    melatonin tablet 9 mg Nightly PRN    multivitamin tablet Daily    NIFEdipine 24 hr tablet 60 mg BID    sodium chloride 0.9% bolus 250 mL 250 mL PRN    sodium chloride 0.9% bolus 250 mL 250 mL PRN    tiotropium bromide 2.5 mcg/actuation inhaler 2 puff Daily       Objective:     Vital Signs (Most Recent):  Temp: 97.3 °F (36.3 °C) (10/25/23 1301)  Pulse: 84 (10/25/23 1122)  Resp: 20 (10/25/23 1301)  BP: 126/83 (10/25/23 1301)  SpO2: (!) 92 % (10/25/23 1301) Vital Signs (24h Range):  Temp:  [97.3 °F (36.3 °C)-98.7 °F (37.1 °C)] 97.3 °F (36.3 °C)  Pulse:  [] 84  Resp:  [18-20] 20  SpO2:  [91 %-97 %] 92 %  BP: (117-138)/(66-92) 126/83     Weight: 68.2 kg (150 lb 5.7 oz) (10/25/23 0400)  Body mass index is 22.2 kg/m².  Body surface area is 1.82 meters squared.    I/O last 3 completed shifts:  In: 1819.7 [P.O.:1220; I.V.:299.7; Other:300]  Out: 3100 [Other:3100]     Physical Exam  Vitals and nursing note reviewed.   Constitutional:       General: He is not in acute  distress.     Appearance: Normal appearance. He is not ill-appearing or toxic-appearing.   HENT:      Head: Normocephalic and atraumatic.      Nose: No congestion or rhinorrhea.   Eyes:      General: No scleral icterus.     Extraocular Movements: Extraocular movements intact.   Cardiovascular:      Rate and Rhythm: Normal rate.   Pulmonary:      Effort: Pulmonary effort is normal. No respiratory distress.   Abdominal:      General: Abdomen is flat.   Genitourinary:     Penis: Normal.    Musculoskeletal:      Cervical back: Normal range of motion.   Skin:     General: Skin is warm and dry.   Neurological:      General: No focal deficit present.      Mental Status: He is alert and oriented to person, place, and time.   Psychiatric:         Mood and Affect: Mood normal.         Behavior: Behavior normal.          Significant Labs:  CBC:   Recent Labs   Lab 10/25/23  0423   WBC 4.26   RBC 2.79*   HGB 8.3*   HCT 25.8*      MCV 93   MCH 29.7   MCHC 32.2     CMP:   Recent Labs   Lab 10/25/23  0423   *   CALCIUM 7.4*   ALBUMIN 2.1*   PROT 5.6*      K 4.6   CO2 26   CL 98   BUN 51*   CREATININE 3.8*   ALKPHOS 163*   ALT 22   AST 16   BILITOT 0.8     All labs within the past 24 hours have been reviewed.

## 2023-10-25 NOTE — ASSESSMENT & PLAN NOTE
S/p IV d 20 drip in ICU  Recent Labs     10/25/23  1244 10/25/23  1301 10/25/23  1306 10/25/23  1308 10/25/23  1347 10/25/23  1510   POCTGLUCOSE 55* 52* 50* 50* 73 124*     -Currently on sliding scale insulin and insulin aspart with meals     Endocrinology is adjusting patient's dose. Was hypoglycemic overnight.

## 2023-10-25 NOTE — NURSING
Patient's midline occluded, unable to flush, midline team consulted this am for check line. Have not seen anyone, tried calling midline/picc team, no answer. Charge nurse and MD aware.

## 2023-10-25 NOTE — SUBJECTIVE & OBJECTIVE
Interval History: see above      Objective:     Vital Signs (Most Recent):  Temp: 97.3 °F (36.3 °C) (10/25/23 1301)  Pulse: 86 (10/25/23 1517)  Resp: 20 (10/25/23 1301)  BP: 126/83 (10/25/23 1301)  SpO2: (!) 91 % (10/25/23 1517) Vital Signs (24h Range):  Temp:  [97.3 °F (36.3 °C)-98.7 °F (37.1 °C)] 97.3 °F (36.3 °C)  Pulse:  [] 86  Resp:  [18-20] 20  SpO2:  [91 %-97 %] 91 %  BP: (117-138)/(66-91) 126/83     Weight: 68.2 kg (150 lb 5.7 oz)  Body mass index is 22.2 kg/m².    Intake/Output Summary (Last 24 hours) at 10/25/2023 1716  Last data filed at 10/25/2023 1349  Gross per 24 hour   Intake 1140 ml   Output --   Net 1140 ml         Physical Exam  Constitutional:       General: He is not in acute distress.     Appearance: Normal appearance. He is not ill-appearing, toxic-appearing or diaphoretic.   HENT:      Head: Normocephalic and atraumatic.      Nose: No congestion or rhinorrhea.   Eyes:      General: No scleral icterus.     Extraocular Movements: Extraocular movements intact.   Cardiovascular:      Rate and Rhythm: Normal rate.      Pulses: Normal pulses.      Heart sounds: Normal heart sounds.   Pulmonary:      Effort: Pulmonary effort is normal. No respiratory distress.      Breath sounds: No stridor. No wheezing, rhonchi or rales.      Comments: Reduced breath sounds bilat.  Chest:      Chest wall: No tenderness.   Abdominal:      General: Abdomen is flat. There is no distension.      Palpations: Abdomen is soft. There is no mass.      Tenderness: There is no abdominal tenderness. There is no guarding.   Genitourinary:     Penis: Normal.    Musculoskeletal:      Cervical back: Normal range of motion.   Skin:     General: Skin is warm and dry.   Neurological:      General: No focal deficit present.      Mental Status: He is alert and oriented to person, place, and time. Mental status is at baseline.   Psychiatric:         Mood and Affect: Mood normal.         Behavior: Behavior normal.         Thought  Content: Thought content normal.         Judgment: Judgment normal.             Significant Labs: All pertinent labs within the past 24 hours have been reviewed.    Significant Imaging: I have reviewed all pertinent imaging results/findings within the past 24 hours.   PROCEDURES:  TAVR, percutaneous 24-Mar-2021 09:40:00 R transfemoral TAVR #[id number] Henrietta Philip

## 2023-10-25 NOTE — SUBJECTIVE & OBJECTIVE
"Interval HPI:   Overnight events: Frequent snacking between meals. BG highly variable. Low this afternoon likely due to prandial insulin increases earlier today -- lowering doses.   Eatin%  Nausea: No  Hypoglycemia and intervention: Yes  Fever: No  TPN and/or TF: No  If yes, type of TF/TPN and rate: n/a    /83 (BP Location: Right arm, Patient Position: Lying)   Pulse 86   Temp 97.3 °F (36.3 °C)   Resp 20   Ht 5' 9" (1.753 m)   Wt 68.2 kg (150 lb 5.7 oz)   SpO2 (!) 91%   BMI 22.20 kg/m²     Labs Reviewed and Include    Recent Labs   Lab 10/25/23  0423   *   CALCIUM 7.4*   ALBUMIN 2.1*   PROT 5.6*      K 4.6   CO2 26   CL 98   BUN 51*   CREATININE 3.8*   ALKPHOS 163*   ALT 22   AST 16   BILITOT 0.8     Lab Results   Component Value Date    WBC 4.26 10/25/2023    HGB 8.3 (L) 10/25/2023    HCT 25.8 (L) 10/25/2023    MCV 93 10/25/2023     10/25/2023     No results for input(s): "TSH", "FREET4" in the last 168 hours.  Lab Results   Component Value Date    HGBA1C 8.4 (H) 10/12/2023       Nutritional status:   Body mass index is 22.2 kg/m².  Lab Results   Component Value Date    ALBUMIN 2.1 (L) 10/25/2023    ALBUMIN 2.1 (L) 10/24/2023    ALBUMIN 2.1 (L) 10/23/2023     No results found for: "PREALBUMIN"    Estimated Creatinine Clearance: 19.9 mL/min (A) (based on SCr of 3.8 mg/dL (H)).    Accu-Checks  Recent Labs     10/24/23  1131 10/24/23  1600 10/24/23  2005 10/25/23  0811 10/25/23  1244 10/25/23  1301 10/25/23  1306 10/25/23  1308 10/25/23  1347 10/25/23  1510   POCTGLUCOSE 195* 323* 357* 143* 55* 52* 50* 50* 73 124*       Current Medications and/or Treatments Impacting Glycemic Control  Immunotherapy:    Immunosuppressants       None          Steroids:   Hormones (From admission, onward)      Start     Stop Route Frequency Ordered    10/23/23 0118  melatonin tablet 9 mg         -- Oral Nightly PRN 10/23/23 001          Pressors:    Autonomic Drugs (From admission, onward)      " None          Hyperglycemia/Diabetes Medications:   Antihyperglycemics (From admission, onward)      Start     Stop Route Frequency Ordered    10/25/23 2100  insulin detemir U-100 (Levemir) pen 3 Units         -- SubQ 2 times daily 10/25/23 1340    10/25/23 1645  insulin aspart U-100 pen 5 Units         -- SubQ 3 times daily with meals 10/25/23 1338    10/23/23 1118  insulin aspart U-100 pen 0-5 Units         -- SubQ Before meals & nightly PRN 10/23/23 1118

## 2023-10-25 NOTE — PROGRESS NOTES
[facility] ([address] Medicine  Progress Note    Patient Name: Cosme Lewis  MRN: [id number]  Patient Class: IP- Inpatient   Admission Date: 10/11/2023  Length of Stay: 14 days  Attending Physician: Wiley Yoon DO  Primary Care Provider: Eliecer Ohara III, MD        Subjective:     Principal Problem:Acute on chronic respiratory failure with hypoxia and hypercapnia        HPI:  Patient is very pleasant, was getting dialysis.  Denies any complaints.  Reports that his shortness of breath is better than it has ever been.  Denies any chest pain or abdominal pain, and reports the only thing that is bothersome is his Sob. He reiterated that it is better than it is ever been      Overview/Hospital Course:  58 year old male with PMH notable for COPD, HFrEF (45% and DD), ESRD on dialysis - MWF, HTN, DM presents via EMS from nursing home for altered mental status and hypoxia.  Per EMS, patient was found to have glucose of 38, he received D50 with improvement in his glucose.  He also had SpO2 58% which improved with 15 L nasal cannula.   ED work up revealed VBG 7.27/61 and he was subsequently placed on bipap for work of breathing. He was persistently hypoglycemic with BG 59 --> 34 despite IV dextrose, therefore he was started on a continuous dextrose infusion. Other lab work up revealed WBC 3, stable, H//H 9.5/29, Na 143, CO2 20, Cr/BUN 3.8/38, elevated AST/ALT/alk phos, and troponin 0.174 with no ischemic changes on EKG. He was given IV rocephin and doxycyline. Chest XR with bilateral opacifications R > L.  Critical care consulted for acute hypoxemic respiratory failure and NIPPV. Found to have COVID pneumonia with overlapping community acquired pneumonia.   ICU Course:  In the MICU, the patient arrived on bipap but was found to still be hypoxic which initially improved after adjusting his bipap settings. He continued to be altered and tried occasionally became combative requiring precedex which  was affective when maxed out at 1.4 mcg/kg/hour. During this time he was also found to have worsening hypoglycemia which required continuous D10 administration at a max rate of 200 cc/hour. Eventually his oxygen status started declining and the decision was made to intubate the patient. It is believed that worsening of his oxygen status was in part due the large volume of D10 he received to maintain his blood sugars. For this reason, it was decided that D20 at a slower rate would be more appropriate, and for this a central line was placed. Additionally, he received a round of hemodialysis. He is receiving remdesivir for COVID and vacomycin/zosyn/doxycycline for community acquired pneumonia coverage/concerning chest x-ray. The patient's sugars started rising on the D20 and eventually he started becoming hyperglycemic  to the 220s which led to the D20 being stopped for the time being and since that time the sugars have been relatively normoglycemic. His abdomen was found to be slightly more distended but KUB was negative for bowel obstruction, likely suggesting ascites as the source.  On 10/12 after passing SAT and SBT, the decision was made to extubate the patient which was done successfully. All sedatives were turned off at this time as well. He was placed on high flow at the time and has been sating well. He continued to receive antibiotics as well as antivirals for CAP coverage/COVID respectively which was able to help improve his likely pneumonia vs ARDS.      Between 10/14 - 10/17 he went back and forth between requiring high flow and bipap, however starting on 10/17 he was able to tolerate nasal canula.   10/18-  No significant overnight events, he states that he feels his breathing has improved. Will be transferred to [facility].    10/24:  Patient is pleasant, no significant events overnight, with receiving dialysis, oxygen demand stable.    10/25:  Patient denies any chest pain or or shortness of breath today.   He was needing to be changed.  I informed the nurse.  Patient continues to have hypoglycemic episodes.  Endocrinology is following.       Interval History: see above      Objective:     Vital Signs (Most Recent):  Temp: 97.3 °F (36.3 °C) (10/25/23 1301)  Pulse: 86 (10/25/23 1517)  Resp: 20 (10/25/23 1301)  BP: 126/83 (10/25/23 1301)  SpO2: (!) 91 % (10/25/23 1517) Vital Signs (24h Range):  Temp:  [97.3 °F (36.3 °C)-98.7 °F (37.1 °C)] 97.3 °F (36.3 °C)  Pulse:  [] 86  Resp:  [18-20] 20  SpO2:  [91 %-97 %] 91 %  BP: (117-138)/(66-91) 126/83     Weight: 68.2 kg (150 lb 5.7 oz)  Body mass index is 22.2 kg/m².    Intake/Output Summary (Last 24 hours) at 10/25/2023 1716  Last data filed at 10/25/2023 1349  Gross per 24 hour   Intake 1140 ml   Output --   Net 1140 ml         Physical Exam  Constitutional:       General: He is not in acute distress.     Appearance: Normal appearance. He is not ill-appearing, toxic-appearing or diaphoretic.   HENT:      Head: Normocephalic and atraumatic.      Nose: No congestion or rhinorrhea.   Eyes:      General: No scleral icterus.     Extraocular Movements: Extraocular movements intact.   Cardiovascular:      Rate and Rhythm: Normal rate.      Pulses: Normal pulses.      Heart sounds: Normal heart sounds.   Pulmonary:      Effort: Pulmonary effort is normal. No respiratory distress.      Breath sounds: No stridor. No wheezing, rhonchi or rales.      Comments: Reduced breath sounds bilat.  Chest:      Chest wall: No tenderness.   Abdominal:      General: Abdomen is flat. There is no distension.      Palpations: Abdomen is soft. There is no mass.      Tenderness: There is no abdominal tenderness. There is no guarding.   Genitourinary:     Penis: Normal.    Musculoskeletal:      Cervical back: Normal range of motion.   Skin:     General: Skin is warm and dry.   Neurological:      General: No focal deficit present.      Mental Status: He is alert and oriented to person, place, and  time. Mental status is at baseline.   Psychiatric:         Mood and Affect: Mood normal.         Behavior: Behavior normal.         Thought Content: Thought content normal.         Judgment: Judgment normal.             Significant Labs: All pertinent labs within the past 24 hours have been reviewed.    Significant Imaging: I have reviewed all pertinent imaging results/findings within the past 24 hours.      Assessment/Plan:      * Acute on chronic respiratory failure with hypoxia and hypercapnia  Patient with Hypercapnic and Hypoxic Respiratory failure which is Chronic.  he is not on home oxygen. Supplemental oxygen was provided and noted-  Signs/symptoms of respiratory failure include- tachypnea, increased work of breathing and use of accessory muscles. Contributing diagnoses includes - CHF, COPD and fluid volume excess Labs and images were reviewed. Patient Has recent ABG, which has been reviewed. Will treat underlying causes and adjust management of respiratory failure as follows-      58 year old male with multiple medical problems who presented to [facility] ED via EMS for AMS, hypoglycemia, and hypoxia placed on NIPPV. Per chart review patient with room air saturation in 50s which improved with HFNC, however, he remained tachypnic with accessory muscle use, therefore he was placed on NIPPV. Unclear if patient requires oxygen at baseline. Previous hospitalizations with oxygen use. CXR with bilateral opacifications R > L and BNP elevated > 4900.      Admitted to ICU, Intubated and mechanically ventilated, extubated to NC 10/13, sating well  Chest x-ray concerning for pneumonia vs ARDS     - Monitoring ABGs PRN  - Treated for COVID-19 pneumonia: Remdesivir completed. Dexamethasone 6 mg daily  - CAP coverage with Vanc/Zosyn completed   - Received HD 10/11, 10/12, 10/13, 10/15 and 10/16.   - Duoneb nebulizer, tiotropium and Fluticasone furoate / Vilanterol     Acute respiratory failure with hypoxia and  hypercapnia  Patient with Hypoxic Respiratory failure which is Acute.  he is not on home oxygen. Supplemental oxygen was provided and noted.  Signs/symptoms of respiratory failure include- respiratory distress. Contributing diagnoses includes - ARDS Labs and images were reviewed. Patient Has recent ABG, which has been reviewed. Will treat underlying causes and adjust management of respiratory failure as follows- continuing antibiotics and antivirals, adjustments of mechanical ventilation, HD and fluid regulation.      Extubated in ICU to 5L NC morning of 10/13. In no acute distress  VBG 7.2552  However mental status improving  -continue to monitor on NC  -ABG as warranted  -Currently on nasal canula (4L)         Type 2 diabetes mellitus with renal complication  Recent Labs     10/16/23  2007 10/16/23  2307 10/17/23  0723 10/17/23  1140 10/17/23  1547 10/18/23  0907   POCTGLUCOSE 155* 142* 134* 183* 172* 188*     On low dose SS  Hypoglycemia protocol      History of pulmonary embolism  Diagnosed 10/2022  Repeat CT 3/2023 with resolution     -hold home eliquis for mental status change  -heparin stopped as patient's platelet level fell to 35,000    Hypoglycemia  S/p IV d 20 drip in ICU  Recent Labs     10/25/23  1244 10/25/23  1301 10/25/23  1306 10/25/23  1308 10/25/23  1347 10/25/23  1510   POCTGLUCOSE 55* 52* 50* 50* 73 124*     -Currently on sliding scale insulin and insulin aspart with meals     Endocrinology is adjusting patient's dose. Was hypoglycemic overnight.       COPD (chronic obstructive pulmonary disease)  --Duo nebs Q4wake  --Currently on nasal canula  --Fluticasone furoate / Vilanterol and Salmeterol     HLD (hyperlipidemia)   LFT's have improved, will restart atorvastatin 40 mg daily      Anemia in ESRD (end-stage renal disease)        Recent Labs   Lab 10/18/23  0439   WBC 2.82*   RBC 2.66*   HGB 7.8*   HCT 23.3*   PLT 39*   MCV 88   MCH 29.3   MCHC 33.5      -- CBC daily and trend   -- Transfuse  for hgb < 7   -- Receives EPO with iHD    Chronic diastolic heart failure    ECHO 09/23:        Left Ventricle: The left ventricle is normal in size. Increased wall thickness. There is concentric hypertrophy. Mild global hypokinesis present. There is mildly reduced systolic function with a visually estimated ejection fraction of 40 - 45%. There is indeterminate diastolic function.    Right Ventricle: Normal right ventricular cavity size. Right ventricle wall motion  is normal. Systolic function is borderline low.    Left Atrium: Left atrium is severely dilated.    Tricuspid Valve: There is mild regurgitation.    Pericardium: There is a trivial effusion. Echodensity seen adjacent to the visceral pericardium of uncertain significance. This may be pericardial fat although other etiologies not excluded. Correlate clinically.    PASP is at least 46 mmHg.     BNP > 4900     --Volume removal per iHD. Appreciate nephrology assistance   --Blood pressure started becoming elevated on 10/17. Home nifedipine and lisinopril restarted as pressures have been within normal limits. Hydralazine pushes prn should pressures rise.     ESRD (end stage renal disease)  Nephrology following  Outpatient HD [facility]: [facility]   HD tx days: MWF   HD tx time: 4hrs   HD access: LUE AVF   HD modality: iHD   Residual urine: Anuric   Estimated Creatinine Clearance: 33.8 mL/min (A) (based on SCr of 2.3 mg/dL (H)).   Toelrating iHD well     -- Nephrology consulted for iHD management. Appreciate their assistance   -- CMP daily and trend   -- Avoid nephrotoxins   -- Renally dose all medications to appropriate GFR / CrCl   -- Hgb > 7 and MAP > 65 goals       VTE Risk Mitigation (From admission, onward)         Ordered     apixaban tablet 2.5 mg  2 times daily         10/20/23 1119     IP VTE HIGH RISK PATIENT  Once         10/11/23 0248     Place sequential compression device  Until discontinued         10/11/23 0248                Discharge  Planning   GERA: 10/27/2023     Code Status: Full Code   Is the patient medically ready for discharge?:     Reason for patient still in hospital (select all that apply): Patient trending condition and Treatment  Discharge Plan A: Return to nursing home   Discharge Delays: None known at this time              Wiley Yoon DO  Department of Hospital Medicine   [facility] - Intensive Care ([place])

## 2023-10-25 NOTE — ASSESSMENT & PLAN NOTE
Suspect hypoglycemia was due to insulin doses in ESRD pt with reduced clearance  He was getting MDI doses gradually reduced -- even after scheduled levemir and novolog were stopped, he continued to get moderate dose correction scale Novolog  In this type of pt especially, would recommend low dose correction scale and not moderate  When he was getting high dose dexamethasone for covid, the mod dose was appropriate, but steroids have been stopped since 10/21 and he no longer warrants aggressive correction scale  Hypoglycemia again 10/25 due to up-titration in prandial insulin. Will make very small changes in insulin doses only and will tolerate intermittent hyperglycemia within reason due to propensity for hypoglycemia.  Do not suspect endogenous insulin production in this pt at this time - would hold on off on hypoglycemia w/u unless persistent hypoglycemia without any insulin

## 2023-10-26 PROBLEM — Z51.5 PALLIATIVE CARE ENCOUNTER: Status: RESOLVED | Noted: 2023-01-01 | Resolved: 2023-01-01

## 2023-10-26 NOTE — CARE UPDATE
Plan of Care:    Nurse notified the BG was decreasing.     Plan of Care Update:    Endocrinology consulted for BG management.   BG goal 140-180    - Levemir (Insulin Detemir) 2 units BID (20% reduction due to blood glucose below goal.)  - Novolog (Insulin Aspart) 5 units TIDWM and prn for BG excursions LDC SSI (150/50)  - BG checks q4hr (primary team adjusted due to acuity status change.   - Hypoglycemia protocol in place  - If blood glucose greater than 300, please ask patient not to eat food or drink anything other than water until correctional insulin has brought it back below 250    ** Please notify Endocrine for any change and/or advance in diet**  ** Please call Endocrine for any BG related issues **    Discharge Planning:   TBD. Please notify endocrinology prior to discharge.      Robbie Alexander DNP, FNP-C  Department of Endocrinology  Inpatient Glycemic Management

## 2023-10-26 NOTE — ASSESSMENT & PLAN NOTE
S/p IV d 20 drip in ICU  Recent Labs     10/26/23  0604 10/26/23  0748 10/26/23  0803 10/26/23  1105 10/26/23  1212 10/26/23  1515   POCTGLUCOSE 100 149* 144* 183* 146* 253*     -Currently on sliding scale insulin and insulin aspart with meals     Endocrinology is adjusting patient's dose.   - intermittent hypoglycemia- endocrine adjusting regimen

## 2023-10-26 NOTE — ASSESSMENT & PLAN NOTE
Endocrinology consulted for BG management.   Inpatient BG goal 140-180    BG highly variable. Very labile/insulin sensitive. Will tolerate intermittent highs due to propensity for hypoglycemia. When making adjustments in doses will up-titrate by 1u at a time.    - Levemir 2 units BID  - Novolog (Insulin Aspart) 4 units tid before meals and low dose SSI prn (180/50) -- correct above 180 instead of 150 given hypoglycemia previously  - He may need low dose levemir added back but for now would monitor on LDC only given severe hypoglycemia earlier this admission   - BG checks AC/HS/0200 or q4h if not eating meals   - Hypoglycemia protocol in place  - If blood glucose greater than 300, please ask patient not to eat food or drink anything other than water until correctional insulin has brought it back below 250  - Minimize between meal snacking if able    ** Please notify Endocrine for any change and/or advance in diet**  ** Please call Endocrine for any BG related issues **    Discharge Planning:   TBD. Please notify endocrinology prior to discharge.

## 2023-10-26 NOTE — ASSESSMENT & PLAN NOTE
Patient with Hypoxic Respiratory failure which is Acute.  he is not on home oxygen. Supplemental oxygen was provided and noted.  Signs/symptoms of respiratory failure include- respiratory distress. Contributing diagnoses includes - ARDS Labs and images were reviewed. Patient Has recent ABG, which has been reviewed. Will treat underlying causes and adjust management of respiratory failure as follows- continuing antibiotics and antivirals, adjustments of mechanical ventilation, HD and fluid regulation.      Extubated in ICU to 5L NC morning of 10/13. In no acute distress  VBG 7.2552  However mental status improving  -continue to monitor on NC  -Currently on comfort flow, wean as tolerated.

## 2023-10-26 NOTE — ASSESSMENT & PLAN NOTE
ECHO 09/23:        Left Ventricle: The left ventricle is normal in size. Increased wall thickness. There is concentric hypertrophy. Mild global hypokinesis present. There is mildly reduced systolic function with a visually estimated ejection fraction of 40 - 45%. There is indeterminate diastolic function.    Right Ventricle: Normal right ventricular cavity size. Right ventricle wall motion  is normal. Systolic function is borderline low.    Left Atrium: Left atrium is severely dilated.    Tricuspid Valve: There is mild regurgitation.    Pericardium: There is a trivial effusion. Echodensity seen adjacent to the visceral pericardium of uncertain significance. This may be pericardial fat although other etiologies not excluded. Correlate clinically.    PASP is at least 46 mmHg.     BNP > 4900     --Volume removal per iHD. Appreciate nephrology assistance   -- Cont home nifedipine 60 mg qd and lisinopril 40 mg qd

## 2023-10-26 NOTE — ASSESSMENT & PLAN NOTE
Patient with Hypercapnic and Hypoxic Respiratory failure which is Chronic.  he is not on home oxygen. Supplemental oxygen was provided and noted-  Signs/symptoms of respiratory failure include- tachypnea, increased work of breathing and use of accessory muscles. Contributing diagnoses includes - CHF, COPD and fluid volume excess Labs and images were reviewed. Patient Has recent ABG, which has been reviewed. Will treat underlying causes and adjust management of respiratory failure as follows-      58 year old male with multiple medical problems who presented to [facility] ED via EMS for AMS, hypoglycemia, and hypoxia placed on NIPPV. Per chart review patient with room air saturation in 50s which improved with HFNC, however, he remained tachypnic with accessory muscle use, therefore he was placed on NIPPV. Unclear if patient requires oxygen at baseline. Previous hospitalizations with oxygen use. CXR with bilateral opacifications R > L and BNP elevated > 4900.      Admitted to ICU, Intubated and mechanically ventilated, extubated to NC 10/13, sating well  Chest x-ray concerning for pneumonia vs ARDS     - Treated for COVID-19 pneumonia: Remdesivir completed. Dexamethasone 6 mg daily completed  - CAP coverage with Vanc/Zosyn completed   - Received HDper renal recs  - Duoneb nebulizer, tiotropium and Fluticasone furoate / Vilanterol   - supplemental O2, wean as tolerated

## 2023-10-26 NOTE — SUBJECTIVE & OBJECTIVE
Interval History:   Remain on CV-19 precautions. HD performed overnight with 2 L removed. Repeat K 4.6. no distress on exam. Appears comfortable on comfort flow.     Review of patient's allergies indicates:  No Active Allergies  Current Facility-Administered Medications   Medication Frequency    0.9%  NaCl infusion Once    acetaminophen tablet 650 mg Q6H PRN    apixaban tablet 2.5 mg BID    atorvastatin tablet 40 mg Daily    carvediloL tablet 12.5 mg BID WM    dextrose 10% bolus 125 mL 125 mL PRN    dextrose 10% bolus 250 mL 250 mL PRN    epoetin xavier-epbx injection 3,510 Units Every Mon, Wed, Fri    FLUoxetine capsule 40 mg Daily    fluticasone furoate-vilanteroL 100-25 mcg/dose diskus inhaler 1 puff Daily    glucose chewable tablet 16 g PRN    glucose chewable tablet 24 g PRN    hydrALAZINE injection 10 mg Q6H PRN    insulin aspart U-100 pen 0-5 Units Q4H PRN    insulin aspart U-100 pen 4 Units TIDWM    insulin detemir U-100 (Levemir) pen 2 Units BID    melatonin tablet 9 mg Nightly PRN    multivitamin tablet Daily    NIFEdipine 24 hr tablet 60 mg BID    sodium chloride 0.9% bolus 250 mL 250 mL PRN    sodium chloride 0.9% bolus 250 mL 250 mL PRN    tiotropium bromide 2.5 mcg/actuation inhaler 2 puff Daily       Objective:     Vital Signs (Most Recent):  Temp: 98.9 °F (37.2 °C) (10/26/23 0807)  Pulse: 80 (10/26/23 0827)  Resp: (!) 21 (10/26/23 0827)  BP: (!) 175/92 (10/26/23 0812)  SpO2: 99 % (10/26/23 0827) Vital Signs (24h Range):  Temp:  [97.3 °F (36.3 °C)-99.7 °F (37.6 °C)] 98.9 °F (37.2 °C)  Pulse:  [69-97] 80  Resp:  [15-30] 21  SpO2:  [74 %-100 %] 99 %  BP: (126-185)/(76-93) 175/92     Weight: 68.2 kg (150 lb 5.7 oz) (10/26/23 0900)  Body mass index is 22.2 kg/m².  Body surface area is 1.82 meters squared.    I/O last 3 completed shifts:  In: 1970 [P.O.:1970]  Out: 2500 [Other:2500]     Physical Exam  Vitals and nursing note reviewed.   Constitutional:       General: He is not in acute distress.      Appearance: Normal appearance. He is not ill-appearing or toxic-appearing.   HENT:      Head: Normocephalic and atraumatic.      Nose: No congestion or rhinorrhea.   Eyes:      General: No scleral icterus.     Extraocular Movements: Extraocular movements intact.   Cardiovascular:      Rate and Rhythm: Normal rate.   Pulmonary:      Effort: Pulmonary effort is normal. No respiratory distress.      Comments: On comfort flow   Abdominal:      General: Abdomen is flat.   Genitourinary:     Penis: Normal.    Musculoskeletal:      Cervical back: Normal range of motion.   Skin:     General: Skin is warm and dry.   Neurological:      Mental Status: He is alert. Mental status is at baseline.   Psychiatric:         Mood and Affect: Mood normal.         Behavior: Behavior normal.          Significant Labs:  CBC:   Recent Labs   Lab 10/26/23  0815   WBC 3.91   RBC 2.80*   HGB 8.3*   HCT 26.1*      MCV 93   MCH 29.6   MCHC 31.8*     CMP:   Recent Labs   Lab 10/26/23  0815   *   CALCIUM 7.6*   ALBUMIN 2.2*   PROT 5.9*      K 3.9   CO2 22*      BUN 30*   CREATININE 2.8*   ALKPHOS 168*   ALT 23   AST 18   BILITOT 0.9     All labs within the past 24 hours have been reviewed.

## 2023-10-26 NOTE — NURSING
Notified by HD nurse patient removed bipap. Bipap and bilateral soft wrist restraints applied. HD nurse visually monitoring patient during infusion. Patient educated on the application of restraints and interventions towards discontinuing. Restraint necessity will be assessed post infusion and or Airvo will be reapplied.

## 2023-10-26 NOTE — PLAN OF CARE
Problem: Adult Inpatient Plan of Care  Goal: Plan of Care Review  Outcome: Ongoing, Progressing  Goal: Patient-Specific Goal (Individualized)  Outcome: Ongoing, Progressing  Goal: Absence of Hospital-Acquired Illness or Injury  Outcome: Ongoing, Progressing  Goal: Optimal Comfort and Wellbeing  Outcome: Ongoing, Progressing  Goal: Readiness for Transition of Care  Outcome: Ongoing, Progressing     Problem: Diabetes Comorbidity  Goal: Blood Glucose Level Within Targeted Range  Outcome: Ongoing, Progressing     Problem: Impaired Wound Healing  Goal: Optimal Wound Healing  Outcome: Ongoing, Progressing     Problem: Device-Related Complication Risk (Hemodialysis)  Goal: Safe, Effective Therapy Delivery  Outcome: Ongoing, Progressing     Problem: Fall Injury Risk  Goal: Absence of Fall and Fall-Related Injury  Outcome: Ongoing, Progressing     Problem: Skin Injury Risk Increased  Goal: Skin Health and Integrity  Outcome: Ongoing, Progressing     Problem: Gas Exchange Impaired  Goal: Optimal Gas Exchange  Outcome: Ongoing, Progressing     Problem: Restraint, Nonbehavioral (Nonviolent)  Goal: Absence of Harm or Injury  Outcome: Ongoing, Progressing

## 2023-10-26 NOTE — NURSING
Patient alert but confused, continuously removes O2. He experienced hypoglycemia was treated with glucose tablets and eun crackers. Patient desaturates to 80% on room air.

## 2023-10-26 NOTE — NURSING
Patient removed airvo and was observed pulling it apart. O2 sats dropped to 80% on RA. Educated patient on wearing oxygen continuously and applied Bipap for the night.

## 2023-10-26 NOTE — ASSESSMENT & PLAN NOTE
Suspect hypoglycemia was due to insulin doses in ESRD pt with reduced clearance  He was getting MDI doses gradually reduced -- even after scheduled levemir and novolog were stopped, he continued to get moderate dose correction scale Novolog  In this type of pt especially, would recommend low dose correction scale and not moderate  When he was getting high dose dexamethasone for covid, the mod dose was appropriate, but steroids have been stopped since 10/21 and he no longer warrants aggressive correction scale  Hypoglycemia again 10/25 due to up-titration in prandial insulin. Will make very small changes in insulin doses only and will tolerate intermittent hyperglycemia within reason due to propensity for hypoglycemia.  Hypoglycemia again 10/25 PM -- further reduced basal and prandial doses on 10/26.  Do not suspect endogenous insulin production in this pt at this time - would hold on off on hypoglycemia w/u unless persistent hypoglycemia without any insulin

## 2023-10-26 NOTE — NURSING
"Patient removed bipap and threw it on the ground, then removed his leads, and pulse ox probe. Found patient anxious and yelling " I can't breath". Bipap was replaced and patient appeared to go to sleep. Ordered telesitter  "

## 2023-10-26 NOTE — ASSESSMENT & PLAN NOTE
--Duo nebs Q4wake  --Currently on HF nasal canula  --Fluticasone furoate / Vilanterol and Salmeterol

## 2023-10-26 NOTE — SUBJECTIVE & OBJECTIVE
"Interval HPI:   Overnight events: hypoglycemia yesterday. Insulin doses reduced.  Eatin%  Nausea: No  Hypoglycemia and intervention: Yes  Fever: No  TPN and/or TF: No  If yes, type of TF/TPN and rate: n/a    BP (!) 173/95 (BP Location: Right leg, Patient Position: Lying)   Pulse 79   Temp 98.9 °F (37.2 °C) (Oral)   Resp 20   Ht 5' 9" (1.753 m)   Wt 68.2 kg (150 lb 5.7 oz)   SpO2 99%   BMI 22.20 kg/m²     Labs Reviewed and Include    Recent Labs   Lab 10/26/23  0815   *   CALCIUM 7.6*   ALBUMIN 2.2*   PROT 5.9*      K 3.9   CO2 22*      BUN 30*   CREATININE 2.8*   ALKPHOS 168*   ALT 23   AST 18   BILITOT 0.9     Lab Results   Component Value Date    WBC 3.91 10/26/2023    HGB 8.3 (L) 10/26/2023    HCT 26.1 (L) 10/26/2023    MCV 93 10/26/2023     10/26/2023     No results for input(s): "TSH", "FREET4" in the last 168 hours.  Lab Results   Component Value Date    HGBA1C 8.4 (H) 10/12/2023       Nutritional status:   Body mass index is 22.2 kg/m².  Lab Results   Component Value Date    ALBUMIN 2.2 (L) 10/26/2023    ALBUMIN 2.1 (L) 10/25/2023    ALBUMIN 2.1 (L) 10/24/2023     No results found for: "PREALBUMIN"    Estimated Creatinine Clearance: 27.1 mL/min (A) (based on SCr of 2.8 mg/dL (H)).    Accu-Checks  Recent Labs     10/25/23  2024 10/25/23  2110 10/25/23  2359 10/26/23  0505 10/26/23  0525 10/26/23  0604 10/26/23  0748 10/26/23  0803 10/26/23  1105 10/26/23  1212   POCTGLUCOSE 97 82 115* 57* 78 100 149* 144* 183* 146*       Current Medications and/or Treatments Impacting Glycemic Control  Immunotherapy:    Immunosuppressants       None          Steroids:   Hormones (From admission, onward)      Start     Stop Route Frequency Ordered    10/23/23 0118  melatonin tablet 9 mg         -- Oral Nightly PRN 10/23/23 0018          Pressors:    Autonomic Drugs (From admission, onward)      None          Hyperglycemia/Diabetes Medications:   Antihyperglycemics (From admission, " onward)      Start     Stop Route Frequency Ordered    10/26/23 1130  insulin aspart U-100 pen 4 Units         -- SubQ 3 times daily with meals 10/26/23 1016    10/25/23 2300  insulin aspart U-100 pen 0-5 Units         -- SubQ Every 4 hours PRN 10/25/23 2157    10/25/23 2200  insulin detemir U-100 (Levemir) pen 2 Units         -- SubQ 2 times daily 10/25/23 2153

## 2023-10-26 NOTE — PROGRESS NOTES
"[facility] - [facility] ([facility]  Endocrinology  Progress Note    Admit Date: 10/11/2023     Cosme Lewis is a 59yo M with T2DM (on insulin at home), ESRD on HD, COPD, HFrEF, and HTN from [facility] who was brought in to the hospital on 10/11/2023 for evaluation of shortness of breath. Patient was diagnosed and treated for COVID pneumonia and sepsis. On admission patient was hypoglycemic felt to be secondary to administration of insulin at the nursing home. Hospitalization has been further complicated by acute encephalopathy.     Since admission patient has been treated with various steroids, levemir and novolog with some hypoglycemia. He received 10 days of high dose dexamethasone for covid. Last dose of dexamethasone was on 10/21. At time of endocrinology consult pt was getting Novolog moderate dose correction scale. Levemir was discontinued several days ago 2/2 hypoglycemia. Endocrinology is consulted for "Labile BG, request recs for new home regimen"    Since discontinuing levemir BG has been >70, although this AM 70s-80s. Getting a significant amount of SSI due to moderate dose correction being ordered instead of low dose in this pt with ESRD.             Regarding T2DM  LOV with NP Snow Calle 2023  Diagnosed with DM around 4 years ago     Outpatient insulin regimen:  Levemir 8 units twice daily   Humalog 8 units plus correction scale before meals   Add correction scale as needed.  Blood sugar 201 to 250 add 2 units  Blood sugar 251 to 300 add 4 units  Blood sugar 301 to 350 add 6 units  Blood sugar greater than 350 add 8 units         Interval HPI:   Overnight events: hypoglycemia yesterday. Insulin doses reduced.  Eatin%  Nausea: No  Hypoglycemia and intervention: Yes  Fever: No  TPN and/or TF: No  If yes, type of TF/TPN and rate: n/a    BP (!) 173/95 (BP Location: Right leg, Patient Position: Lying)   Pulse 79   Temp 98.9 °F (37.2 °C) (Oral)   Resp 20   Ht 5' 9" (1.753 " "m)   Wt 68.2 kg (150 lb 5.7 oz)   SpO2 99%   BMI 22.20 kg/m²     Labs Reviewed and Include    Recent Labs   Lab 10/26/23  0815   *   CALCIUM 7.6*   ALBUMIN 2.2*   PROT 5.9*      K 3.9   CO2 22*      BUN 30*   CREATININE 2.8*   ALKPHOS 168*   ALT 23   AST 18   BILITOT 0.9     Lab Results   Component Value Date    WBC 3.91 10/26/2023    HGB 8.3 (L) 10/26/2023    HCT 26.1 (L) 10/26/2023    MCV 93 10/26/2023     10/26/2023     No results for input(s): "TSH", "FREET4" in the last 168 hours.  Lab Results   Component Value Date    HGBA1C 8.4 (H) 10/12/2023       Nutritional status:   Body mass index is 22.2 kg/m².  Lab Results   Component Value Date    ALBUMIN 2.2 (L) 10/26/2023    ALBUMIN 2.1 (L) 10/25/2023    ALBUMIN 2.1 (L) 10/24/2023     No results found for: "PREALBUMIN"    Estimated Creatinine Clearance: 27.1 mL/min (A) (based on SCr of 2.8 mg/dL (H)).    Accu-Checks  Recent Labs     10/25/23  2024 10/25/23  2110 10/25/23  2359 10/26/23  0505 10/26/23  0525 10/26/23  0604 10/26/23  0748 10/26/23  0803 10/26/23  1105 10/26/23  1212   POCTGLUCOSE 97 82 115* 57* 78 100 149* 144* 183* 146*       Current Medications and/or Treatments Impacting Glycemic Control  Immunotherapy:    Immunosuppressants       None          Steroids:   Hormones (From admission, onward)      Start     Stop Route Frequency Ordered    10/23/23 0118  melatonin tablet 9 mg         -- Oral Nightly PRN 10/23/23 0018          Pressors:    Autonomic Drugs (From admission, onward)      None          Hyperglycemia/Diabetes Medications:   Antihyperglycemics (From admission, onward)      Start     Stop Route Frequency Ordered    10/26/23 1130  insulin aspart U-100 pen 4 Units         -- SubQ 3 times daily with meals 10/26/23 1016    10/25/23 2300  insulin aspart U-100 pen 0-5 Units         -- SubQ Every 4 hours PRN 10/25/23 2157    10/25/23 2200  insulin detemir U-100 (Levemir) pen 2 Units         -- SubQ 2 times daily 10/25/23 " 2155            ASSESSMENT and PLAN    Pulmonary  * Acute on chronic respiratory failure with hypoxia and hypercapnia  COVID dx on admission -- was on high dose steroids which was increasing insulin requirement, but last dose of dex was 10/21  Management per primary     Renal/  ESRD (end stage renal disease)  ESRD on HD  Cautious with insulin given decreased renal clearance as this is likely the reason for hypoglycemia.    Endocrine  Type 2 diabetes mellitus with renal complication  Endocrinology consulted for BG management.   Inpatient BG goal 140-180    BG highly variable. Very labile/insulin sensitive. Will tolerate intermittent highs due to propensity for hypoglycemia. When making adjustments in doses will up-titrate by 1u at a time.    - Levemir 2 units BID  - Novolog (Insulin Aspart) 4 units tid before meals and low dose SSI prn (180/50) -- correct above 180 instead of 150 given hypoglycemia previously  - He may need low dose levemir added back but for now would monitor on LDC only given severe hypoglycemia earlier this admission   - BG checks AC/HS/0200 or q4h if not eating meals   - Hypoglycemia protocol in place  - If blood glucose greater than 300, please ask patient not to eat food or drink anything other than water until correctional insulin has brought it back below 250  - Minimize between meal snacking if able    ** Please notify Endocrine for any change and/or advance in diet**  ** Please call Endocrine for any BG related issues **    Discharge Planning:   TBD. Please notify endocrinology prior to discharge.      Hypoglycemia  Suspect hypoglycemia was due to insulin doses in ESRD pt with reduced clearance  He was getting MDI doses gradually reduced -- even after scheduled levemir and novolog were stopped, he continued to get moderate dose correction scale Novolog  In this type of pt especially, would recommend low dose correction scale and not moderate  When he was getting high dose dexamethasone  for covid, the mod dose was appropriate, but steroids have been stopped since 10/21 and he no longer warrants aggressive correction scale  Hypoglycemia again 10/25 due to up-titration in prandial insulin. Will make very small changes in insulin doses only and will tolerate intermittent hyperglycemia within reason due to propensity for hypoglycemia.  Hypoglycemia again 10/25 PM -- further reduced basal and prandial doses on 10/26.  Do not suspect endogenous insulin production in this pt at this time - would hold on off on hypoglycemia w/u unless persistent hypoglycemia without any insulin           Isabel Middleton MD  Endocrinology  [facility] - Intensive Care ([place]-)

## 2023-10-26 NOTE — SIGNIFICANT EVENT
RAPID RESPONSE NURSE NOTE        Admit Date: 10/11/2023  LOS: 14  Code Status: Full Code   Date of Consult: 10/25/2023  : 1965  Age: 58 y.o.  Weight:   Wt Readings from Last 1 Encounters:   10/25/23 68.2 kg (150 lb 5.7 oz)     Sex: male  Race: Black or    Bed: 01305/91434 A:   MRN: [id number]  Time Rapid Response Team page Received:   Time Rapid Response Team at Bedside:   Time Rapid Response Team left Bedside:   Was the patient discharged from an ICU this admission? Yes   Was the patient discharged from a PACU within last 24 hours? No   Did the patient receive conscious sedation/general anesthesia in last 24 hours? No  Was the patient in the ED within the past 24 hours? No  Was the patient on NIPPV within the past 24 hours? No   Did this progress into an ARC or CPA: No  Attending Physician: Wiley Yoon DO  Primary Service: [facility] MED        SITUATION    Notified by  Hypoxia on Telemetry Monitor while rounding on floor .  Reason for alert: Hypoxia  Called to evaluate the patient for Respiratory    BACKGROUND     Why is the patient in the hospital?: Acute on chronic respiratory failure with hypoxia and hypercapnia    Patient has a past medical history of CHF (congestive heart failure), Chronic kidney disease-mineral and bone disorder, Chronic respiratory failure, COPD (chronic obstructive pulmonary disease), Diabetes mellitus type 1, ESRD on dialysis, Hypertension, Hypervolemia, Hypoglycemia, Hyponatremia, Hyponatremia, Other insomnia, Recurrent major depression, SOB (shortness of breath), and Unspecified lack of coordination.    Last Vitals:  Temp: 97.8 °F (36.6 °C) (10/25 1919)  Pulse: 75 (10/25 2112)  Resp: 24 (10/25 2100)  BP: 159/84 (10/25 2112)  SpO2: 98 % (10/25 2112)    24 Hours Vitals Range:  Temp:  [97.3 °F (36.3 °C)-98.7 °F (37.1 °C)]   Pulse:  []   Resp:  [18-24]   BP: (120-159)/(74-91)   SpO2:  [74 %-98 %]     Labs:  Recent Labs     10/23/23  9292  "10/24/23  0405 10/25/23  0423   WBC 4.81 4.98 4.26   HGB 7.5* 8.6* 8.3*   HCT 23.6* 24.7* 25.8*   * 91* 183       Recent Labs     10/23/23  0314 10/24/23  0405 10/25/23  0423   * 130* 136   K 4.8 5.8* 4.6    99 98   CO2 20* 15* 26   BUN 60* 78* 51*   CREATININE 3.9* 4.8* 3.8*   GLU 89 219* 196*   PHOS 4.3 5.8* 4.8*   MG 2.0 2.2 2.0        No results for input(s): "PH", "PCO2", "PO2", "HCO3", "POCSATURATED", "BE" in the last 72 hours.     ASSESSMENT    Physical Exam  Constitutional:       General: He is not in acute distress.  Eyes:      Pupils: Pupils are equal, round, and reactive to light.   Cardiovascular:      Rate and Rhythm: Normal rate and regular rhythm.      Pulses: Normal pulses.   Pulmonary:      Effort: Tachypnea present. No respiratory distress.   Abdominal:      General: Abdomen is flat.   Musculoskeletal:      Right lower leg: No edema.      Left lower leg: No edema.   Skin:     General: Skin is warm.      Capillary Refill: Capillary refill takes 2 to 3 seconds.   Neurological:      General: No focal deficit present.      Mental Status: He is alert and oriented to person, place, and time. Mental status is at baseline.         INTERVENTIONS    The patient was seen for Respiratory problem. Staff concerns included oxygen saturation < 90% despite supplemental oxygen and increased oxygen requirements. The following interventions were performed: supplemental oxygen, POCT arterial blood gas , portable chest x-ray, continuous pulse ox monitoring continued, and continuous cardiac monitoring continued.    RECOMMENDATIONS    We recommend: Pt noted to be hypoxic with Sp02 74-75% on 5L/NC bubble flow on telemetry monitor. On arrival to bedside patient endorsed SOB, O2 titrated up to 10L/HFNC with improvement of Spo2 to 81-83%. O2 titrated further to 15L/HFNC with improvement of SpO2 85-87%. RT notified, PA notified, pt placed on Airvo 30L/65% with improvement of SpO2 92-96%. STAT CXR ordered and " obtained, follow up ABG ordered. Pt also with hypoglycemia earlier today. Endocrine notified per primary RN.     PROVIDER ESCALATION    Orders received and case discussed with  JENNIFER Underwood.    Primary team arrival time: NA    Disposition: Tx to [facility], bed 18439.    FOLLOW UP    bedside RNyKung  updated on plan of care. Instructed to call the Rapid Response Nurse, Deepak Chavez, RN at [phone] for additional questions or concerns.

## 2023-10-26 NOTE — PROGRESS NOTES
[facility] - [facility]ensive Care ([place] [address])  Nephrology  Progress Note    Patient Name: Cosme Lewis  MRN: [id number]  Admission Date: 10/11/2023  Hospital Length of Stay: 15 days  Attending Provider: Cecy Sierra MD   Primary Care Physician: Eliecer Ohara III, MD  Principal Problem:Acute on chronic respiratory failure with hypoxia and hypercapnia    Subjective:     Interval History:   Remain on CV-19 precautions. HD performed overnight with 2 L removed. Repeat K 4.6. no distress on exam. Appears comfortable on comfort flow.     Review of patient's allergies indicates:  No Active Allergies  Current Facility-Administered Medications   Medication Frequency    0.9%  NaCl infusion Once    acetaminophen tablet 650 mg Q6H PRN    apixaban tablet 2.5 mg BID    atorvastatin tablet 40 mg Daily    carvediloL tablet 12.5 mg BID WM    dextrose 10% bolus 125 mL 125 mL PRN    dextrose 10% bolus 250 mL 250 mL PRN    epoetin xavier-epbx injection 3,510 Units Every Mon, Wed, Fri    FLUoxetine capsule 40 mg Daily    fluticasone furoate-vilanteroL 100-25 mcg/dose diskus inhaler 1 puff Daily    glucose chewable tablet 16 g PRN    glucose chewable tablet 24 g PRN    hydrALAZINE injection 10 mg Q6H PRN    insulin aspart U-100 pen 0-5 Units Q4H PRN    insulin aspart U-100 pen 4 Units TIDWM    insulin detemir U-100 (Levemir) pen 2 Units BID    melatonin tablet 9 mg Nightly PRN    multivitamin tablet Daily    NIFEdipine 24 hr tablet 60 mg BID    sodium chloride 0.9% bolus 250 mL 250 mL PRN    sodium chloride 0.9% bolus 250 mL 250 mL PRN    tiotropium bromide 2.5 mcg/actuation inhaler 2 puff Daily       Objective:     Vital Signs (Most Recent):  Temp: 98.9 °F (37.2 °C) (10/26/23 0807)  Pulse: 80 (10/26/23 0827)  Resp: (!) 21 (10/26/23 0827)  BP: (!) 175/92 (10/26/23 0812)  SpO2: 99 % (10/26/23 0827) Vital Signs (24h Range):  Temp:  [97.3 °F (36.3 °C)-99.7 °F (37.6 °C)] 98.9 °F (37.2 °C)  Pulse:  [69-97] 80  Resp:  [15-30]  21  SpO2:  [74 %-100 %] 99 %  BP: (126-185)/(76-93) 175/92     Weight: 68.2 kg (150 lb 5.7 oz) (10/26/23 0900)  Body mass index is 22.2 kg/m².  Body surface area is 1.82 meters squared.    I/O last 3 completed shifts:  In: 1970 [P.O.:1970]  Out: 2500 [Other:2500]     Physical Exam  Vitals and nursing note reviewed.   Constitutional:       General: He is not in acute distress.     Appearance: Normal appearance. He is not ill-appearing or toxic-appearing.   HENT:      Head: Normocephalic and atraumatic.      Nose: No congestion or rhinorrhea.   Eyes:      General: No scleral icterus.     Extraocular Movements: Extraocular movements intact.   Cardiovascular:      Rate and Rhythm: Normal rate.   Pulmonary:      Effort: Pulmonary effort is normal. No respiratory distress.      Comments: On comfort flow   Abdominal:      General: Abdomen is flat.   Genitourinary:     Penis: Normal.    Musculoskeletal:      Cervical back: Normal range of motion.   Skin:     General: Skin is warm and dry.   Neurological:      Mental Status: He is alert. Mental status is at baseline.   Psychiatric:         Mood and Affect: Mood normal.         Behavior: Behavior normal.          Significant Labs:  CBC:   Recent Labs   Lab 10/26/23  0815   WBC 3.91   RBC 2.80*   HGB 8.3*   HCT 26.1*      MCV 93   MCH 29.6   MCHC 31.8*     CMP:   Recent Labs   Lab 10/26/23  0815   *   CALCIUM 7.6*   ALBUMIN 2.2*   PROT 5.9*      K 3.9   CO2 22*      BUN 30*   CREATININE 2.8*   ALKPHOS 168*   ALT 23   AST 18   BILITOT 0.9     All labs within the past 24 hours have been reviewed.       Assessment/Plan:     Pulmonary  * Acute on chronic respiratory failure with hypoxia and hypercapnia  - defer to primary team     Renal/  ESRD (end stage renal disease)  ESRD on iHD F  [facility]  4 hours  EDW:  71 kg  UMA AVF      Plan/Recommendations:  -no further HD requirements today, will plan for HD tomorrow.   -continue strict  I/O's  -RFP daily  -renal diet when advanced, 1L fluid restrictions  -Phos WNL, no need for binders at this time    Anemia of chronic kidney disease;    Hemoglobin   Date Value Ref Range Status   10/26/2023 8.3 (L) 14.0 - 18.0 g/dL Final   10/25/2023 8.3 (L) 14.0 - 18.0 g/dL Final   10/24/2023 8.6 (L) 14.0 - 18.0 g/dL Final     Saturated Iron   Date Value Ref Range Status   08/22/2023 19 (L) 20 - 50 % Final   02/23/2023 19 (L) 20 - 50 % Final     Ferritin   Date Value Ref Range Status   08/22/2023 1,455 (H) 20.0 - 300.0 ng/mL Final   02/23/2023 1,803 (H) 20.0 - 300.0 ng/mL Final     Iron   Date Value Ref Range Status   08/22/2023 48 45 - 160 ug/dL Final   02/23/2023 39 (L) 45 - 160 ug/dL Final     TIBC   Date Value Ref Range Status   08/22/2023 249 (L) 250 - 450 ug/dL Final   02/23/2023 203 (L) 250 - 450 ug/dL Final     HB target 10-12  Epogen with HD    Mineral Bone Disease    Calcium   Date Value Ref Range Status   10/26/2023 7.6 (L) 8.7 - 10.5 mg/dL Final   10/25/2023 7.4 (L) 8.7 - 10.5 mg/dL Final     Phosphorus   Date Value Ref Range Status   10/26/2023 3.4 2.7 - 4.5 mg/dL Final   10/25/2023 4.8 (H) 2.7 - 4.5 mg/dL Final      Continue renal diet  No binders         Thank you for your consult. I will follow-up with patient. Please contact us if you have any additional questions.    Rosi Grant DNP, FNP-C  Nephrology  [facility] - Intensive Care ([place]

## 2023-10-26 NOTE — RESPIRATORY THERAPY
"RAPID RESPONSE RESPIRATORY THERAPY PROACTIVE ROUNDING NOTE             Time of visit: 1250      Code Status: Full Code   : 1965  Bed: 55330/69309 A:   MRN: [id number]  Time spent at the bedside: < 15 min    SITUATION    Evaluated patient for: HFNC Compliance     BACKGROUND    Patient has a past medical history of CHF (congestive heart failure), Chronic kidney disease-mineral and bone disorder, Chronic respiratory failure, COPD (chronic obstructive pulmonary disease), Diabetes mellitus type 1, ESRD on dialysis, Hypertension, Hypervolemia, Hypoglycemia, Hyponatremia, Hyponatremia, Other insomnia, Recurrent major depression, SOB (shortness of breath), and Unspecified lack of coordination.    24 Hours Vitals Range:  Temp:  [97.2 °F (36.2 °C)-99.7 °F (37.6 °C)]   Pulse:  [69-97]   Resp:  [15-30]   BP: (131-185)/(76-95)   SpO2:  [74 %-100 %]     Labs:    Recent Labs     10/24/23  0405 10/25/23  0423 10/26/23  0815   * 136 136   K 5.8* 4.6 3.9   CL 99 98 101   CO2 15* 26 22*   BUN 78* 51* 30*   CREATININE 4.8* 3.8* 2.8*   * 196* 136*   PHOS 5.8* 4.8* 3.4   MG 2.2 2.0 2.0        No results for input(s): "PH", "PCO2", "PO2", "HCO3", "POCSATURATED", "BE" in the last 72 hours.    ASSESSMENT/INTERVENTIONS        Last VS   Temp: 98 °F (36.7 °C) (10/26 1515)  Pulse: 82 (10/26 1654)  Resp: 20 (10/26 1515)  BP: 172/82 (10/26 1654)  SpO2: 92 % (10/26 1515)    Level of Consciousness: Level of Consciousness (AVPU): alert  Respiratory Effort: Respiratory Effort: Normal, Unlabored Expansion/Accessory Muscle Usage: Expansion/Accessory Muscles/Retractions: no use of accessory muscles, no retractions, expansion symmetric  All Lung Field Breath Sounds: All Lung Fields Breath Sounds: crackles, diminished  O2 Device/Concentration: 35L 60%  Was the O2 device able to be weaned? No  Ambu at bedside:      Active Orders   Respiratory Care    Bipap QHS     Frequency: QHS     Number of Occurrences: Until Specified     Order " Questions:      Mode Bilevel      FiO2% 65      Inspiratory pressure: 18      Expiratory pressure: 8    Oxygen Continuous     Frequency: Continuous     Number of Occurrences: Until Specified     Order Questions:      Device type: High flow      Device: High Flow Nasal Cannula (6 -15 Liters)      LPM: 6-15      Titrate O2 per Oxygen Titration Protocol: Yes      To maintain SpO2 goal of: >= 90%      Notify MD of: Inability to achieve desired SpO2; Sudden change in patient status and requires 20% increase in FiO2; Patient requires >60% FiO2    Pulse Oximetry Continuous     Frequency: Continuous     Number of Occurrences: Until Specified       RECOMMENDATIONS    We recommend: RRT Recs: Continue POC per primary team.      FOLLOW-UP    Please call back the Rapid Response RT, Vanesa Quinn, RRT at x [phone] for any questions or concerns.

## 2023-10-26 NOTE — PROGRESS NOTES
[facility] ([address] Medicine  Progress Note    Patient Name: Cosme Lewis  MRN: [id number]  Patient Class: IP- Inpatient   Admission Date: 10/11/2023  Length of Stay: 15 days  Attending Physician: Cecy Sierra MD  Primary Care Provider: Eliecer Ohara III, MD        Subjective:     Principal Problem:Acute on chronic respiratory failure with hypoxia and hypercapnia        HPI:  Patient is very pleasant, was getting dialysis.  Denies any complaints.  Reports that his shortness of breath is better than it has ever been.  Denies any chest pain or abdominal pain, and reports the only thing that is bothersome is his Sob. He reiterated that it is better than it is ever been      Overview/Hospital Course:  58 year old male with PMH notable for COPD, HFrEF (45% and DD), ESRD on dialysis - MWF, HTN, DM presents via EMS from nursing home for altered mental status and hypoxia.  Per EMS, patient was found to have glucose of 38, he received D50 with improvement in his glucose.  He also had SpO2 58% which improved with 15 L nasal cannula.   ED work up revealed VBG 7.27/61 and he was subsequently placed on bipap for work of breathing. He was persistently hypoglycemic with BG 59 --> 34 despite IV dextrose, therefore he was started on a continuous dextrose infusion. Other lab work up revealed WBC 3, stable, H//H 9.5/29, Na 143, CO2 20, Cr/BUN 3.8/38, elevated AST/ALT/alk phos, and troponin 0.174 with no ischemic changes on EKG. He was given IV rocephin and doxycyline. Chest XR with bilateral opacifications R > L.  Critical care consulted for acute hypoxemic respiratory failure and NIPPV. Found to have COVID pneumonia with overlapping community acquired pneumonia.   ICU Course:  In the MICU, the patient arrived on bipap but was found to still be hypoxic which initially improved after adjusting his bipap settings. He continued to be altered and tried occasionally became combative requiring precedex which  was affective when maxed out at 1.4 mcg/kg/hour. During this time he was also found to have worsening hypoglycemia which required continuous D10 administration at a max rate of 200 cc/hour. Eventually his oxygen status started declining and the decision was made to intubate the patient. It is believed that worsening of his oxygen status was in part due the large volume of D10 he received to maintain his blood sugars. For this reason, it was decided that D20 at a slower rate would be more appropriate, and for this a central line was placed. Additionally, he received a round of hemodialysis. He is receiving remdesivir for COVID and vacomycin/zosyn/doxycycline for community acquired pneumonia coverage/concerning chest x-ray. The patient's sugars started rising on the D20 and eventually he started becoming hyperglycemic  to the 220s which led to the D20 being stopped for the time being and since that time the sugars have been relatively normoglycemic. His abdomen was found to be slightly more distended but KUB was negative for bowel obstruction, likely suggesting ascites as the source.  On 10/12 after passing SAT and SBT, the decision was made to extubate the patient which was done successfully. All sedatives were turned off at this time as well. He was placed on high flow at the time and has been sating well. He continued to receive antibiotics as well as antivirals for CAP coverage/COVID respectively which was able to help improve his likely pneumonia vs ARDS. Between 10/14 - 10/17 he went back and forth between requiring high flow and bipap, however starting on 10/17 he was able to tolerate nasal canula.     Stepped down to hospital medicine. Having intermittent delirium overnight pulling at O2, requiring placement of restraints. Transitioned to HFNC given desaturations. Delirium precautions added.   Endocrine following and adjusting insulin given intermittent hypoglycemia.       Interval History: Overnight patient  pulling at lines and O2, desaturations noted when he pulled off O2. Required restraints to maintain safety and O2 in place. Delirium precautions added  Hypoglycemia noted overnight as well, hypoglycemia protocol in place, BG improved this morning. Endocrine following and adjusting insulin regimen.     Review of Systems   Constitutional:  Negative for fever.   Respiratory:  Positive for cough and shortness of breath.    Gastrointestinal:  Negative for vomiting.   Psychiatric/Behavioral:  Positive for confusion.      Objective:     Vital Signs (Most Recent):  Temp: 98 °F (36.7 °C) (10/26/23 1515)  Pulse: 82 (10/26/23 1654)  Resp: 20 (10/26/23 1515)  BP: (!) 172/82 (10/26/23 1654)  SpO2: (!) 92 % (10/26/23 1515) Vital Signs (24h Range):  Temp:  [97.2 °F (36.2 °C)-99.7 °F (37.6 °C)] 98 °F (36.7 °C)  Pulse:  [69-97] 82  Resp:  [15-30] 20  SpO2:  [74 %-100 %] 92 %  BP: (131-185)/(76-95) 172/82     Weight: 68.2 kg (150 lb 5.7 oz)  Body mass index is 22.2 kg/m².    Intake/Output Summary (Last 24 hours) at 10/26/2023 1718  Last data filed at 10/26/2023 1200  Gross per 24 hour   Intake 1310 ml   Output 2500 ml   Net -1190 ml         Physical Exam  Constitutional:       General: He is not in acute distress.     Appearance: Normal appearance.   HENT:      Head: Normocephalic and atraumatic.      Nose: No congestion or rhinorrhea.   Eyes:      General: No scleral icterus.     Extraocular Movements: Extraocular movements intact.   Cardiovascular:      Rate and Rhythm: Normal rate and regular rhythm.      Pulses: Normal pulses.      Heart sounds: Normal heart sounds.   Pulmonary:      Effort: Pulmonary effort is normal.      Breath sounds: No wheezing.      Comments: HFNC in place  Abdominal:      General: Abdomen is flat. There is no distension.      Palpations: Abdomen is soft.      Tenderness: There is no abdominal tenderness.   Genitourinary:     Penis: Normal.    Musculoskeletal:         General: No swelling.      Cervical  back: Normal range of motion.   Skin:     General: Skin is warm.   Neurological:      General: No focal deficit present.      Mental Status: He is alert and oriented to person, place, and time.      Comments: [place] to self, place and year   Psychiatric:      Comments: In restraints              Significant Labs: All pertinent labs within the past 24 hours have been reviewed.    Significant Imaging: I have reviewed all pertinent imaging results/findings within the past 24 hours.      Assessment/Plan:      * Acute on chronic respiratory failure with hypoxia and hypercapnia  Patient with Hypercapnic and Hypoxic Respiratory failure which is Chronic.  he is not on home oxygen. Supplemental oxygen was provided and noted-  Signs/symptoms of respiratory failure include- tachypnea, increased work of breathing and use of accessory muscles. Contributing diagnoses includes - CHF, COPD and fluid volume excess Labs and images were reviewed. Patient Has recent ABG, which has been reviewed. Will treat underlying causes and adjust management of respiratory failure as follows-      58 year old male with multiple medical problems who presented to [facility] via [place] for AMS, hypoglycemia, and hypoxia placed on NIPPV. Per chart review patient with room air saturation in 50s which improved with HFNC, however, he remained tachypnic with accessory muscle use, therefore he was placed on NIPPV. Unclear if patient requires oxygen at baseline. Previous hospitalizations with oxygen use. CXR with bilateral opacifications R > L and BNP elevated > 4900.      Admitted to ICU, Intubated and mechanically ventilated, extubated to NC 10/13, sating well  Chest x-ray concerning for pneumonia vs ARDS     - Treated for COVID-19 pneumonia: Remdesivir completed. Dexamethasone 6 mg daily completed  - CAP coverage with Vanc/Zosyn completed   - Received HDper renal recs  - Duoneb nebulizer, tiotropium and Fluticasone furoate / Vilanterol   - supplemental O2, wean  as tolerated    Acute respiratory failure with hypoxia and hypercapnia  Patient with Hypoxic Respiratory failure which is Acute.  he is not on home oxygen. Supplemental oxygen was provided and noted.  Signs/symptoms of respiratory failure include- respiratory distress. Contributing diagnoses includes - ARDS Labs and images were reviewed. Patient Has recent ABG, which has been reviewed. Will treat underlying causes and adjust management of respiratory failure as follows- continuing antibiotics and antivirals, adjustments of mechanical ventilation, HD and fluid regulation.      Extubated in ICU to 5L NC morning of 10/13. In no acute distress  VBG 7.2552  However mental status improving  -continue to monitor on NC  -Currently on comfort flow, wean as tolerated.        Type 2 diabetes mellitus with renal complication  Recent Labs     10/26/23  0604 10/26/23  0748 10/26/23  0803 10/26/23  1105 10/26/23  1212 10/26/23  1515   POCTGLUCOSE 100 149* 144* 183* 146* 253*     On low dose SS  Hypoglycemia protocol      History of pulmonary embolism  Diagnosed 10/2022  Repeat CT 3/2023 with resolution     - cont home eliquis     Hypoglycemia  S/p IV d 20 drip in ICU  Recent Labs     10/26/23  0604 10/26/23  0748 10/26/23  0803 10/26/23  1105 10/26/23  1212 10/26/23  1515   POCTGLUCOSE 100 149* 144* 183* 146* 253*     -Currently on sliding scale insulin and insulin aspart with meals     Endocrinology is adjusting patient's dose.   - intermittent hypoglycemia- endocrine adjusting regimen       COPD (chronic obstructive pulmonary disease)  --Duo nebs Q4wake  --Currently on HF nasal canula  --Fluticasone furoate / Vilanterol and Salmeterol     HLD (hyperlipidemia)   LFT's have improved, will restart atorvastatin 40 mg daily      Anemia in ESRD (end-stage renal disease)        Recent Labs   Lab 10/18/23  0439   WBC 2.82*   RBC 2.66*   HGB 7.8*   HCT 23.3*   PLT 39*   MCV 88   MCH 29.3   MCHC 33.5      -- CBC daily and trend   --  Transfuse for hgb < 7   -- Receives EPO with iHD    Chronic diastolic heart failure    ECHO 09/23:        Left Ventricle: The left ventricle is normal in size. Increased wall thickness. There is concentric hypertrophy. Mild global hypokinesis present. There is mildly reduced systolic function with a visually estimated ejection fraction of 40 - 45%. There is indeterminate diastolic function.    Right Ventricle: Normal right ventricular cavity size. Right ventricle wall motion  is normal. Systolic function is borderline low.    Left Atrium: Left atrium is severely dilated.    Tricuspid Valve: There is mild regurgitation.    Pericardium: There is a trivial effusion. Echodensity seen adjacent to the visceral pericardium of uncertain significance. This may be pericardial fat although other etiologies not excluded. Correlate clinically.    PASP is at least 46 mmHg.     BNP > 4900     --Volume removal per iHD. Appreciate nephrology assistance   -- Cont home nifedipine 60 mg qd and lisinopril 40 mg qd    ESRD (end stage renal disease)  Nephrology following  Outpatient HD [facility]: [facility]   HD tx days: MWF   HD tx time: 4hrs   HD access: LUE AVF   HD modality: iHD   Residual urine: Anuric   Estimated Creatinine Clearance: 33.8 mL/min (A) (based on SCr of 2.3 mg/dL (H)).   Toelrating iHD well     -- Nephrology consulted for iHD management. Appreciate their assistance   -- CMP daily and trend   -- Avoid nephrotoxins   -- Renally dose all medications to appropriate GFR / CrCl   -- Hgb > 7 and MAP > 65 goals       VTE Risk Mitigation (From admission, onward)         Ordered     apixaban tablet 5 mg  2 times daily         10/26/23 8960     IP VTE HIGH RISK PATIENT  Once         10/11/23 0248     Place sequential compression device  Until discontinued         10/11/23 0248                Discharge Planning   GERA: 10/27/2023     Code Status: Full Code   Is the patient medically ready for discharge?: No    Reason for  patient still in hospital (select all that apply): Patient trending condition  Discharge Plan A: [facility]: [phone])   Discharge Delays: (!) Procedure Scheduling (IR, OR, Labs, Echo, Cath, Echo, EEG)              Cecy Sierra MD  Department of Hospital Medicine   [facility] - Intensive Care ([place])

## 2023-10-26 NOTE — PLAN OF CARE
Problem: Adult Inpatient Plan of Care  Goal: Plan of Care Review  10/26/2023 0731 by Janet Vasquez RN  Outcome: Ongoing, Progressing  10/26/2023 0730 by Janet Vasquez RN  Outcome: Ongoing, Progressing  Goal: Patient-Specific Goal (Individualized)  10/26/2023 0731 by Janet Vasquez RN  Outcome: Ongoing, Progressing  10/26/2023 0730 by Janet Vasquez RN  Outcome: Ongoing, Progressing  Goal: Absence of Hospital-Acquired Illness or Injury  10/26/2023 0731 by Janet Vasquez RN  Outcome: Ongoing, Progressing  10/26/2023 0730 by Janet Vasquez RN  Outcome: Ongoing, Progressing  Goal: Optimal Comfort and Wellbeing  10/26/2023 0731 by Janet Vasquez RN  Outcome: Ongoing, Progressing  10/26/2023 0730 by Janet Vasquez RN  Outcome: Ongoing, Progressing  Goal: Readiness for Transition of Care  10/26/2023 0731 by Janet Vasquez RN  Outcome: Ongoing, Progressing  10/26/2023 0730 by Janet Vasquez RN  Outcome: Ongoing, Progressing     Problem: Device-Related Complication Risk (Hemodialysis)  Goal: Safe, Effective Therapy Delivery  10/26/2023 0731 by Janet Vasquez RN  Outcome: Ongoing, Progressing  10/26/2023 0730 by Janet Vasquez RN  Outcome: Ongoing, Progressing     Problem: Hemodynamic Instability (Hemodialysis)  Goal: Effective Tissue Perfusion  10/26/2023 0731 by Janet Vasquez RN  Outcome: Ongoing, Progressing  10/26/2023 0730 by Janet Vasquez RN  Outcome: Ongoing, Progressing     Problem: Infection (Hemodialysis)  Goal: Absence of Infection Signs and Symptoms  10/26/2023 0731 by Janet Vasquez RN  Outcome: Ongoing, Progressing  10/26/2023 0730 by Janet Vasquez RN  Outcome: Ongoing, Progressing     Problem: Fall Injury Risk  Goal: Absence of Fall and Fall-Related Injury  10/26/2023 0731 by Janet Vasquez RN  Outcome: Ongoing, Progressing  10/26/2023 0730 by Janet Vasquez RN  Outcome: Ongoing, Progressing     Problem: Infection  Goal: Absence of Infection Signs and Symptoms  10/26/2023 0731 by Janet Vasquez  RN  Outcome: Ongoing, Progressing  10/26/2023 0730 by Janet Vasquez RN  Outcome: Ongoing, Progressing     Problem: Coping Ineffective  Goal: Effective Coping  10/26/2023 0731 by Janet Vasquez RN  Outcome: Ongoing, Progressing  10/26/2023 0730 by Janet Vasquez RN  Outcome: Ongoing, Progressing     Problem: Gas Exchange Impaired  Goal: Optimal Gas Exchange  10/26/2023 0731 by Janet Vasquez RN  Outcome: Ongoing, Progressing  10/26/2023 0730 by Janet Vasquez RN  Outcome: Ongoing, Progressing     Problem: Restraint, Nonbehavioral (Nonviolent)  Goal: Absence of Harm or Injury  Outcome: Ongoing, Progressing     Problem: Diabetes Comorbidity  Goal: Blood Glucose Level Within Targeted Range  10/26/2023 0731 by Janet Vasquez RN  Outcome: Ongoing, Not Progressing  10/26/2023 0730 by Janet Vasquez RN  Outcome: Ongoing, Not Progressing     Problem: Impaired Wound Healing  Goal: Optimal Wound Healing  10/26/2023 0731 by Janet Vasquez RN  Outcome: Ongoing, Not Progressing  10/26/2023 0730 by Janet Vasquez RN  Outcome: Ongoing, Not Progressing     Problem: Skin Injury Risk Increased  Goal: Skin Health and Integrity  10/26/2023 0731 by Janet Vasquez RN  Outcome: Ongoing, Not Progressing  10/26/2023 0730 by Janet Vasquez RN  Outcome: Ongoing, Not Progressing

## 2023-10-26 NOTE — ASSESSMENT & PLAN NOTE
Recent Labs     10/26/23  0604 10/26/23  0748 10/26/23  0803 10/26/23  1105 10/26/23  1212 10/26/23  1515   POCTGLUCOSE 100 149* 144* 183* 146* 253*     On low dose SS  Hypoglycemia protocol

## 2023-10-26 NOTE — CARE UPDATE
RAPID RESPONSE NURSE FOLLOW-UP NOTE       Followed up with patient for a rapid response.  No acute issues at this time. Reviewed plan of care with bedside RNJanet .   Team will continue to follow.  Please call Rapid Response RN, Deepak Chavez RN with any questions or concerns at [phone].

## 2023-10-26 NOTE — SUBJECTIVE & OBJECTIVE
Interval History: Overnight patient pulling at lines and O2, desaturations noted when he pulled off O2. Required restraints to maintain safety and O2 in place. Delirium precautions added  Hypoglycemia noted overnight as well, hypoglycemia protocol in place, BG improved this morning. Endocrine following and adjusting insulin regimen.     Review of Systems   Constitutional:  Negative for fever.   Respiratory:  Positive for cough and shortness of breath.    Gastrointestinal:  Negative for vomiting.   Psychiatric/Behavioral:  Positive for confusion.      Objective:     Vital Signs (Most Recent):  Temp: 98 °F (36.7 °C) (10/26/23 1515)  Pulse: 82 (10/26/23 1654)  Resp: 20 (10/26/23 1515)  BP: (!) 172/82 (10/26/23 1654)  SpO2: (!) 92 % (10/26/23 1515) Vital Signs (24h Range):  Temp:  [97.2 °F (36.2 °C)-99.7 °F (37.6 °C)] 98 °F (36.7 °C)  Pulse:  [69-97] 82  Resp:  [15-30] 20  SpO2:  [74 %-100 %] 92 %  BP: (131-185)/(76-95) 172/82     Weight: 68.2 kg (150 lb 5.7 oz)  Body mass index is 22.2 kg/m².    Intake/Output Summary (Last 24 hours) at 10/26/2023 1718  Last data filed at 10/26/2023 1200  Gross per 24 hour   Intake 1310 ml   Output 2500 ml   Net -1190 ml         Physical Exam  Constitutional:       General: He is not in acute distress.     Appearance: Normal appearance.   HENT:      Head: Normocephalic and atraumatic.      Nose: No congestion or rhinorrhea.   Eyes:      General: No scleral icterus.     Extraocular Movements: Extraocular movements intact.   Cardiovascular:      Rate and Rhythm: Normal rate and regular rhythm.      Pulses: Normal pulses.      Heart sounds: Normal heart sounds.   Pulmonary:      Effort: Pulmonary effort is normal.      Breath sounds: No wheezing.      Comments: HFNC in place  Abdominal:      General: Abdomen is flat. There is no distension.      Palpations: Abdomen is soft.      Tenderness: There is no abdominal tenderness.   Genitourinary:     Penis: Normal.    Musculoskeletal:          General: No swelling.      Cervical back: Normal range of motion.   Skin:     General: Skin is warm.   Neurological:      General: No focal deficit present.      Mental Status: He is alert and oriented to person, place, and time.      Comments: [place] to self, place and year   Psychiatric:      Comments: In restraints              Significant Labs: All pertinent labs within the past 24 hours have been reviewed.    Significant Imaging: I have reviewed all pertinent imaging results/findings within the past 24 hours.

## 2023-10-26 NOTE — ASSESSMENT & PLAN NOTE
COVID dx on admission -- was on high dose steroids which was increasing insulin requirement, but last dose of dex was 10/21  Management per primary

## 2023-10-26 NOTE — ASSESSMENT & PLAN NOTE
[place] [facility]  [facility]  [facility]  EDW:  71 kg  UMA AVF      Plan/Recommendations:  -no further HD requirements today, will plan for HD tomorrow.   -continue strict I/O's  -RFP daily  -renal diet when advanced, 1L fluid restrictions  -Phos WNL, no need for binders at this time    Anemia of chronic kidney disease;    Hemoglobin   Date Value Ref Range Status   10/26/2023 8.3 (L) 14.0 - 18.0 g/dL Final   10/25/2023 8.3 (L) 14.0 - 18.0 g/dL Final   10/24/2023 8.6 (L) 14.0 - 18.0 g/dL Final     Saturated Iron   Date Value Ref Range Status   08/22/2023 19 (L) 20 - 50 % Final   02/23/2023 19 (L) 20 - 50 % Final     Ferritin   Date Value Ref Range Status   08/22/2023 1,455 (H) 20.0 - 300.0 ng/mL Final   02/23/2023 1,803 (H) 20.0 - 300.0 ng/mL Final     Iron   Date Value Ref Range Status   08/22/2023 48 45 - 160 ug/dL Final   02/23/2023 39 (L) 45 - 160 ug/dL Final     TIBC   Date Value Ref Range Status   08/22/2023 249 (L) 250 - 450 ug/dL Final   02/23/2023 203 (L) 250 - 450 ug/dL Final     HB target 10-12  Epogen with HD    Mineral Bone Disease    Calcium   Date Value Ref Range Status   10/26/2023 7.6 (L) 8.7 - 10.5 mg/dL Final   10/25/2023 7.4 (L) 8.7 - 10.5 mg/dL Final     Phosphorus   Date Value Ref Range Status   10/26/2023 3.4 2.7 - 4.5 mg/dL Final   10/25/2023 4.8 (H) 2.7 - 4.5 mg/dL Final      Continue renal diet  No binders

## 2023-10-26 NOTE — PROGRESS NOTES
HD tx completed  Net UF: 2L  Manual pressure held for 5 minutes until hemostasis achieved; dressing dry and intact; no oozing or bleeding noted.  Primary RN at bedside.

## 2023-10-26 NOTE — PLAN OF CARE
10/26/23 0845   Discharge Reassessment   Assessment Type Discharge Planning Reassessment   Did the patient's condition or plan change since previous assessment? No   Discharge Plan discussed with: Patient   Name(s) and Number(s) Kassandra Leon (Mother)   [phone] (Mobile   Communicated GERA with patient/caregiver Yes   [facility]: [phone][facility]   Discharge Plan B Return to Nursing Home   DME Needed Upon Discharge  oxygen;wheelchair   Transition of Care Barriers Does not adhere to care plan   Why the patient remains in the hospital Requires continued medical care   Post-Acute Status   Post-Acute Authorization Placement   Post-Acute Placement Status Set-up Complete/Auth obtained   Coverage MEDICARE - MEDICARE PART A & B   Hospital Resources/Appts/Education Provided Provided education on problems/symptoms using teachback   Discharge Delays (!) Procedure Scheduling (IR, OR, Labs, Echo, Cath, Echo, EEG)     CM met with patient/family to discuss any changes in discharge planning.  No changes in DC plans.  Patient to [facility] to [facility] as a residential patient . GERA:  10/27/23    Blanca Brown RN  Case Management  [facility]  [phone]

## 2023-10-26 NOTE — PROGRESS NOTES
"[facility] - Intensive Care ([facility]-)  Adult Nutrition  Progress Note    SUMMARY       Recommendations    Continue Minced & Moist (level 5), Diabetic diet - add Renal diet restrictions if necessary.  RD to monitor & follow-up.    Goals: Meet % EEN, EPN by RD f/u date  Nutrition Goal Status: goal met  Communication of RD Recs: reviewed with RN    Assessment and Plan    Nutrition Problem:  Inadequate energy intake     Related to (etiology):   Inability to consume sufficient energy      Signs and Symptoms (as evidenced by):   NPO     Interventions(treatment strategy):  Collaboration of nutrition care w/ other providers     Nutrition Diagnosis Status:   Resolved    Reason for Assessment    Reason For Assessment: RD follow-up  Diagnosis: other (see comments) (Resp. fx)  Relevant Medical History: HF, DM, ESRD on HD  Interdisciplinary Rounds: attended    General Information Comments: Pt continues to tolerate diet w/ 100% PO intake. Pt received HD overnight. Pt with no significant wt loss PTA (UBW: 160#). Pt appears nourished; NFPE not warranted.   Nutrition Discharge Planning: Pending clinical course    Nutrition/Diet History    Factors Affecting Nutritional Intake: None identified at this time    Anthropometrics    Temp: 98.9 °F (37.2 °C)  Height Method: Stated  Height: 5' 9" (175.3 cm)  Height (inches): 69 in  Weight Method: Bed Scale  Weight: 68.2 kg (150 lb 5.7 oz)  Weight (lb): 150.36 lb  Ideal Body Weight (IBW), Male: 160 lb  % Ideal Body Weight, Male (lb): 93.98 %  BMI (Calculated): 22.2  BMI Grade: 18.5-24.9 - normal    Lab/Procedures/Meds    Pertinent Labs Reviewed: reviewed  Pertinent Labs Comments: Creat 3.8, GFR 17.4, P 4.8, A1C 8.4  Pertinent Medications Reviewed: reviewed  Pertinent Medications Comments: -    Estimated/Assessed Needs    Weight Used For Calorie Calculations: 68.2 kg (150 lb 5.7 oz)    Energy Calorie Requirements (kcal): 1853 kcal/d  Energy Need Method: [place] (1.25 " PAL)    Protein Requirements: 82 g/d (1.2 g/kg)  Weight Used For Protein Calculations: 68.2 kg (150 lb 5.7 oz)    Estimated Fluid Requirement Method: other (see comments) (Per MD or 1 mL/kcal)  RDA Method (mL): 1853    CHO Requirement: 235g    Nutrition Prescription Ordered    Current Diet Order: Minced & Moist (level 5), 2000 kcal ADA  Current Nutrition Support Formula Ordered: Other (Comment) (Discontinued)    Evaluation of Received Nutrient/Fluid Intake    I/O: -12.9L since 10/12    Comments: LBM: 10/26    Tolerance: tolerating    Nutrition Risk    Level of Risk/Frequency of Follow-up:  (1x/week)     Monitor and Evaluation    Food and Nutrient Intake: energy intake, food and beverage intake  Food and Nutrient Adminstration: diet order  Physical Activity and Function: nutrition-related ADLs and IADLs  Anthropometric Measurements: weight, weight change  Biochemical Data, Medical Tests and Procedures: glucose/endocrine profile, lipid profile, inflammatory profile, gastrointestinal profile, electrolyte and renal panel     Nutrition Follow-Up    RD Follow-up?: Yes

## 2023-10-27 PROBLEM — Z71.89 ADVANCE CARE PLANNING: Status: RESOLVED | Noted: 2023-01-01 | Resolved: 2023-01-01

## 2023-10-27 NOTE — ASSESSMENT & PLAN NOTE
Endocrinology consulted for BG management.   Inpatient BG goal 140-180    BG highly variable. Very labile/insulin sensitive. Will tolerate intermittent highs due to propensity for hypoglycemia.   Hypoglycemia o/n after 2u levemir at bedtime and no aspart near the low.     - I planned to switch levemir 2u BID to 4u daily given in the morning, since he is usually high during the day and has lows o/n if he does have lows. For now I discontinued levemir order. He received 2u Levemir this AM -- so I will monitor his BG throughout the day/night and then make a decision on whether or not to add levemir tomorrow morning based on BG. Ultimately he may not require any basal insulin.  - Novolog (Insulin Aspart) 4 units tid before meals and low dose SSI prn (180/50) -- correct above 180 instead of 150 given hypoglycemia   - He may need low dose levemir added back but for now would monitor on LDC only given severe hypoglycemia earlier this admission   - BG checks AC/HS/0200 or q4h if not eating meals   - Hypoglycemia protocol in place  - If blood glucose greater than 300, please ask patient not to eat food or drink anything other than water until correctional insulin has brought it back below 250  - Minimize between meal snacking if able    ** Please notify Endocrine for any change and/or advance in diet**  ** Please call Endocrine for any BG related issues **    Discharge Planning:   TBD. Please notify endocrinology prior to discharge.

## 2023-10-27 NOTE — ASSESSMENT & PLAN NOTE
Patient with Hypercapnic and Hypoxic Respiratory failure which is Chronic.  he is not on home oxygen. Supplemental oxygen was provided and noted-  Signs/symptoms of respiratory failure include- tachypnea, increased work of breathing and use of accessory muscles. Contributing diagnoses includes - CHF, COPD and fluid volume excess Labs and images were reviewed. Patient Has recent ABG, which has been reviewed. Will treat underlying causes and adjust management of respiratory failure as follows-      58 year old male with multiple medical problems who [place] [facility] [place]MS, hypoglycemia, and hypoxia placed on NIPPV. Per chart review patient with room air saturation in 50s which improved with HFNC, however, he remained tachypnic with accessory muscle use, therefore he was placed on NIPPV. Unclear if patient requires oxygen at baseline. Previous hospitalizations with oxygen use. CXR with bilateral opacifications R > L and BNP elevated > 4900.      Admitted to ICU, Intubated and mechanically ventilated, extubated to NC 10/13, sating well  Chest x-ray concerning for pneumonia vs ARDS     - Treated for COVID-19 pneumonia: Remdesivir completed. Dexamethasone 6 mg daily completed  - CAP coverage with Vanc/Zosyn completed   - Received HDper renal recs  - Duoneb nebulizer, tiotropium and Fluticasone furoate / Vilanterol   - supplemental O2, wean as tolerated. Currently on airvo HFNC

## 2023-10-27 NOTE — ASSESSMENT & PLAN NOTE
Recent Labs     10/27/23  0411 10/27/23  0412 10/27/23  0445 10/27/23  0614 10/27/23  0821 10/27/23  1203   POCTGLUCOSE 39* 41* 179* 111* 92 151*     On low dose SS  Hypoglycemia protocol

## 2023-10-27 NOTE — PROGRESS NOTES
[facility] - Intensive Care ([address] Medicine  Progress Note    Patient Name: Cosme Lewis  MRN: [id number]  Patient Class: IP- Inpatient   Admission Date: 10/11/2023  Length of Stay: 16 days  Attending Physician: Cecy Sierra MD  Primary Care Provider: Eliecer Ohara III, MD        Subjective:     Principal Problem:Acute on chronic respiratory failure with hypoxia and hypercapnia        HPI:  Patient is very pleasant, was getting dialysis.  Denies any complaints.  Reports that his shortness of breath is better than it has ever been.  Denies any chest pain or abdominal pain, and reports the only thing that is bothersome is his Sob. He reiterated that it is better than it is ever been      Overview/Hospital Course:  58 year old male with PMH notable for COPD, HFrEF (45% and DD), ESRD on dialysis - MWF, HTN, DM presents via EMS from nursing home for altered mental status and hypoxia.  Per EMS, patient was found to have glucose of 38, he received D50 with improvement in his glucose.  He also had SpO2 58% which improved with 15 L nasal cannula.   ED work up revealed VBG 7.27/61 and he was subsequently placed on bipap for work of breathing. He was persistently hypoglycemic with BG 59 --> 34 despite IV dextrose, therefore he was started on a continuous dextrose infusion. Other lab work up revealed WBC 3, stable, H//H 9.5/29, Na 143, CO2 20, Cr/BUN 3.8/38, elevated AST/ALT/alk phos, and troponin 0.174 with no ischemic changes on EKG. He was given IV rocephin and doxycyline. Chest XR with bilateral opacifications R > L.  Critical care consulted for acute hypoxemic respiratory failure and NIPPV. Found to have COVID pneumonia with overlapping community acquired pneumonia.   ICU Course:  In the MICU, the patient arrived on bipap but was found to still be hypoxic which initially improved after adjusting his bipap settings. He continued to be altered and tried occasionally became combative requiring precedex which  was affective when maxed out at 1.4 mcg/kg/hour. During this time he was also found to have worsening hypoglycemia which required continuous D10 administration at a max rate of 200 cc/hour. Eventually his oxygen status started declining and the decision was made to intubate the patient. It is believed that worsening of his oxygen status was in part due the large volume of D10 he received to maintain his blood sugars. For this reason, it was decided that D20 at a slower rate would be more appropriate, and for this a central line was placed. Additionally, he received a round of hemodialysis. He is receiving remdesivir for COVID and vacomycin/zosyn/doxycycline for community acquired pneumonia coverage/concerning chest x-ray. The patient's sugars started rising on the D20 and eventually he started becoming hyperglycemic  to the 220s which led to the D20 being stopped for the time being and since that time the sugars have been relatively normoglycemic. His abdomen was found to be slightly more distended but KUB was negative for bowel obstruction, likely suggesting ascites as the source.  On 10/12 after passing SAT and SBT, the decision was made to extubate the patient which was done successfully. All sedatives were turned off at this time as well. He was placed on high flow at the time and has been sating well. He continued to receive antibiotics as well as antivirals for CAP coverage/COVID respectively which was able to help improve his likely pneumonia vs ARDS. Between 10/14 - 10/17 he went back and forth between requiring high flow and bipap, however starting on 10/17 he was able to tolerate nasal canula.     Stepped down to hospital medicine. Having intermittent delirium overnight pulling at O2, requiring placement of restraints. Transitioned to HFNC given desaturations. Delirium precautions added.   Endocrine following and adjusting insulin given intermittent hypoglycemia.       Interval History: Overnight  hypoglycemic again to 40s, given snack and D10. BG improved this morning. Endocrine aware and adjusting regimen.      Patient continues to require restraints due to safety and pulling at NC. Cont airvo HFNC. Delirium precautions in place.       Review of Systems   Constitutional:  Negative for fever.   Respiratory:  Positive for cough and shortness of breath.    Gastrointestinal:  Negative for vomiting.     Objective:     Vital Signs (Most Recent):  Temp: 98.1 °F (36.7 °C) (10/27/23 0800)  Pulse: 71 (10/27/23 1448)  Resp: 20 (10/27/23 0847)  BP: (!) 172/81 (10/27/23 0929)  SpO2: (!) 92 % (10/27/23 1448) Vital Signs (24h Range):  Temp:  [97.9 °F (36.6 °C)-98.1 °F (36.7 °C)] 98.1 °F (36.7 °C)  Pulse:  [65-84] 71  Resp:  [18-33] 20  SpO2:  [83 %-97 %] 92 %  BP: (157-197)/(72-84) 172/81     Weight: 68 kg (149 lb 14.6 oz)  Body mass index is 22.14 kg/m².    Intake/Output Summary (Last 24 hours) at 10/27/2023 1505  Last data filed at 10/27/2023 0508  Gross per 24 hour   Intake 600 ml   Output 0 ml   Net 600 ml           Physical Exam  Constitutional:       General: He is not in acute distress.     Appearance: Normal appearance.   HENT:      Head: Normocephalic and atraumatic.      Nose: No congestion or rhinorrhea.   Eyes:      General: No scleral icterus.     Extraocular Movements: Extraocular movements intact.   Cardiovascular:      Rate and Rhythm: Normal rate and regular rhythm.      Pulses: Normal pulses.      Heart sounds: Normal heart sounds.   Pulmonary:      Effort: Pulmonary effort is normal.      Breath sounds: No wheezing.      Comments: Airvo HFNC in place  Abdominal:      General: Abdomen is flat. There is no distension.      Palpations: Abdomen is soft.      Tenderness: There is no abdominal tenderness.   Genitourinary:     Penis: Normal.    Musculoskeletal:         General: No swelling.      Cervical back: Normal range of motion.   Skin:     General: Skin is warm.   Neurological:      General: No focal  deficit present.      Mental Status: He is alert and oriented to person, place, and time.      Comments: [place] to self, place and year   Psychiatric:      Comments: In restraints              Significant Labs: All pertinent labs within the past 24 hours have been reviewed.    Significant Imaging: I have reviewed all pertinent imaging results/findings within the past 24 hours.      Assessment/Plan:      * Acute on chronic respiratory failure with hypoxia and hypercapnia  Patient with Hypercapnic and Hypoxic Respiratory failure which is Chronic.  he is not on home oxygen. Supplemental oxygen was provided and noted-  Signs/symptoms of respiratory failure include- tachypnea, increased work of breathing and use of accessory muscles. Contributing diagnoses includes - CHF, COPD and fluid volume excess Labs and images were reviewed. Patient Has recent ABG, which has been reviewed. Will treat underlying causes and adjust management of respiratory failure as follows-      58 year old male with multiple medical problems who presented to [facility] via EMS for AMS, hypoglycemia, and hypoxia placed on NIPPV. Per chart review patient with room air saturation in 50s which improved with HFNC, however, he remained tachypnic with accessory muscle use, therefore he was placed on NIPPV. Unclear if patient requires oxygen at baseline. Previous hospitalizations with oxygen use. CXR with bilateral opacifications R > L and BNP elevated > 4900.      Admitted to ICU, Intubated and mechanically ventilated, extubated to NC 10/13, sating well  Chest x-ray concerning for pneumonia vs ARDS     - Treated for COVID-19 pneumonia: Remdesivir completed. Dexamethasone 6 mg daily completed  - CAP coverage with Vanc/Zosyn completed   - Received HDper renal recs  - Duoneb nebulizer, tiotropium and Fluticasone furoate / Vilanterol   - supplemental O2, wean as tolerated. Currently on airvo HFNC     Dyspnea  - see resp failure  - cont supportive  airvo/HFNC      Acute respiratory failure with hypoxia and hypercapnia  Patient with Hypoxic Respiratory failure which is Acute.  he is not on home oxygen. Supplemental oxygen was provided and noted.  Signs/symptoms of respiratory failure include- respiratory distress. Contributing diagnoses includes - ARDS Labs and images were reviewed. Patient Has recent ABG, which has been reviewed. Will treat underlying causes and adjust management of respiratory failure as follows- continuing antibiotics and antivirals, adjustments of mechanical ventilation, HD and fluid regulation.      Extubated in ICU to 5L NC morning of 10/13. In no acute distress  VBG 7.2552  However mental status improving continues to be a safety concern pulling at NC  -Currently on comfort/airvo high flow, wean as tolerated.        Type 2 diabetes mellitus with renal complication  Recent Labs     10/27/23  0411 10/27/23  0412 10/27/23  0445 10/27/23  0614 10/27/23  0821 10/27/23  1203   POCTGLUCOSE 39* 41* 179* 111* 92 151*     On low dose SS  Hypoglycemia protocol      History of pulmonary embolism  Diagnosed 10/2022  Repeat CT 3/2023 with resolution     - cont home eliquis     Hypoglycemia  S/p IV d 20 drip in ICU  Recent Labs     10/27/23  0411 10/27/23  0412 10/27/23  0445 10/27/23  0614 10/27/23  0821 10/27/23  1203   POCTGLUCOSE 39* 41* 179* 111* 92 151*     -Currently on sliding scale insulin and insulin aspart with meals     Endocrinology is adjusting patient's dose.   - intermittent hypoglycemia- endocrine adjusting regimen       COPD (chronic obstructive pulmonary disease)  --Duo nebs Q4wake  --Currently on HF nasal canula  --Fluticasone furoate / Vilanterol and Salmeterol     HLD (hyperlipidemia)   LFT's have improved, will restart atorvastatin 40 mg daily      Anemia in ESRD (end-stage renal disease)        Recent Labs   Lab 10/18/23  0439   WBC 2.82*   RBC 2.66*   HGB 7.8*   HCT 23.3*   PLT 39*   MCV 88   MCH 29.3   MCHC 33.5      -- CBC  daily and trend   -- Transfuse for hgb < 7   -- Receives EPO with iHD    Chronic diastolic heart failure    ECHO 09/23:        Left Ventricle: The left ventricle is normal in size. Increased wall thickness. There is concentric hypertrophy. Mild global hypokinesis present. There is mildly reduced systolic function with a visually estimated ejection fraction of 40 - 45%. There is indeterminate diastolic function.    Right Ventricle: Normal right ventricular cavity size. Right ventricle wall motion  is normal. Systolic function is borderline low.    Left Atrium: Left atrium is severely dilated.    Tricuspid Valve: There is mild regurgitation.    Pericardium: There is a trivial effusion. Echodensity seen adjacent to the visceral pericardium of uncertain significance. This may be pericardial fat although other etiologies not excluded. Correlate clinically.    PASP is at least 46 mmHg.     BNP > 4900     --Volume removal per iHD. Appreciate nephrology assistance   -- Cont home nifedipine 60 mg qd and lisinopril 40 mg qd    ESRD (end stage renal disease)  Nephrology following  Outpatient HD [facility]: [facility]   HD tx days: MWF   HD tx time: 4hrs   HD access: LUE AVF   HD modality: iHD   Residual urine: Anuric   Estimated Creatinine Clearance: 33.8 mL/min (A) (based on SCr of 2.3 mg/dL (H)).   Toelrating iHD well     -- Nephrology consulted for iHD management. Appreciate their assistance   -- CMP daily and trend   -- Avoid nephrotoxins   -- Renally dose all medications to appropriate GFR / CrCl   -- Hgb > 7 and MAP > 65 goals       VTE Risk Mitigation (From admission, onward)         Ordered     apixaban tablet 5 mg  2 times daily         10/26/23 174     IP VTE HIGH RISK PATIENT  Once         10/11/23 0248     Place sequential compression device  Until discontinued         10/11/23 0248                Discharge Planning   GERA: 10/31/2023     Code Status: Full Code   Is the patient medically ready for  discharge?: No    Reason for patient still in hospital (select all that apply): Patient trending condition  Discharge Plan A: Return to nursing home   Discharge Delays: (!) Procedure Scheduling (IR, OR, Labs, Echo, Cath, Echo, EEG)              Cecy Sierra MD  Department of Hospital Medicine   [facility] - Intensive Care ([place]-)

## 2023-10-27 NOTE — ASSESSMENT & PLAN NOTE
Suspect hypoglycemia was due to insulin doses in ESRD pt with reduced clearance  Had another hypoglycemic episode very early AM 10/27 after only receiving 2u levemir at bedtime and no aspart after dinnertime. Will hold levemir for now and monitor BG. If levemir is needed will plan to dose in morning only and not at bedtime.  Do not suspect pathologic endogenous insulin production in this pt at this time - would hold on off on hypoglycemia w/u unless persistent hypoglycemia without any insulin

## 2023-10-27 NOTE — PLAN OF CARE
Pt slept well this shift. A&Ox 2. Disoriented to time & situation. VSS.  Remains on Airvo 35/50. Remains in covid precautions. Denies pain.  Bilateral soft wrist restraints continued. Pt continues to remove oxygen with trials. LUE HD Fistula. Bruit & thrill present. Drsg in place. C/D/I. BG low this AM. D10 bolus administered with effect. MD notified. T&RQ2. Skin care completed. Safety precautions in place. Call light in reach. No further concerns noted at this time.    Problem: Adult Inpatient Plan of Care  Goal: Plan of Care Review  Outcome: Ongoing, Progressing  Goal: Patient-Specific Goal (Individualized)  Outcome: Ongoing, Progressing  Goal: Absence of Hospital-Acquired Illness or Injury  Outcome: Ongoing, Progressing  Goal: Optimal Comfort and Wellbeing  Outcome: Ongoing, Progressing  Goal: Readiness for Transition of Care  Outcome: Ongoing, Progressing     Problem: Diabetes Comorbidity  Goal: Blood Glucose Level Within Targeted Range  Outcome: Ongoing, Progressing     Problem: Impaired Wound Healing  Goal: Optimal Wound Healing  Outcome: Ongoing, Progressing     Problem: Device-Related Complication Risk (Hemodialysis)  Goal: Safe, Effective Therapy Delivery  Outcome: Ongoing, Progressing     Problem: Hemodynamic Instability (Hemodialysis)  Goal: Effective Tissue Perfusion  Outcome: Ongoing, Progressing     Problem: Infection (Hemodialysis)  Goal: Absence of Infection Signs and Symptoms  Outcome: Ongoing, Progressing     Problem: Fall Injury Risk  Goal: Absence of Fall and Fall-Related Injury  Outcome: Ongoing, Progressing     Problem: Restraint, Nonbehavioral (Nonviolent)  Goal: Absence of Harm or Injury  Outcome: Ongoing, Progressing     Problem: Infection  Goal: Absence of Infection Signs and Symptoms  Outcome: Ongoing, Progressing     Problem: Skin Injury Risk Increased  Goal: Skin Health and Integrity  Outcome: Ongoing, Progressing     Problem: Coping Ineffective  Goal: Effective Coping  Outcome: Ongoing,  Progressing     Problem: Gas Exchange Impaired  Goal: Optimal Gas Exchange  Outcome: Ongoing, Progressing

## 2023-10-27 NOTE — ASSESSMENT & PLAN NOTE
ESRD on iHD MWF  [facility]  4 hours  EDW:  71 kg  UMA AVF      Plan/Recommendations:  -iHD today for metabolic clearance and volume removal.    -continue strict I/O's  -RFP daily  -renal diet when advanced, 1L fluid restrictions  -Phos WNL, no need for binders at this time    Anemia of chronic kidney disease;    Hemoglobin   Date Value Ref Range Status   10/27/2023 7.8 (L) 14.0 - 18.0 g/dL Final   10/26/2023 8.3 (L) 14.0 - 18.0 g/dL Final   10/25/2023 8.3 (L) 14.0 - 18.0 g/dL Final     Saturated Iron   Date Value Ref Range Status   08/22/2023 19 (L) 20 - 50 % Final   02/23/2023 19 (L) 20 - 50 % Final     Ferritin   Date Value Ref Range Status   08/22/2023 1,455 (H) 20.0 - 300.0 ng/mL Final   02/23/2023 1,803 (H) 20.0 - 300.0 ng/mL Final     Iron   Date Value Ref Range Status   08/22/2023 48 45 - 160 ug/dL Final   02/23/2023 39 (L) 45 - 160 ug/dL Final     TIBC   Date Value Ref Range Status   08/22/2023 249 (L) 250 - 450 ug/dL Final   02/23/2023 203 (L) 250 - 450 ug/dL Final     HB target 10-12  Epogen with HD    Mineral Bone Disease    Calcium   Date Value Ref Range Status   10/27/2023 7.2 (L) 8.7 - 10.5 mg/dL Final   10/26/2023 7.6 (L) 8.7 - 10.5 mg/dL Final     Phosphorus   Date Value Ref Range Status   10/27/2023 4.1 2.7 - 4.5 mg/dL Final   10/26/2023 3.4 2.7 - 4.5 mg/dL Final      Continue renal diet  No binders

## 2023-10-27 NOTE — SUBJECTIVE & OBJECTIVE
Interval History: Overnight hypoglycemic again to 40s, given snack and D10. BG improved this morning. Endocrine aware and adjusting regimen.      Patient continues to require restraints due to safety and pulling at NC. Cont airvo HFNC. Delirium precautions in place.       Review of Systems   Constitutional:  Negative for fever.   Respiratory:  Positive for cough and shortness of breath.    Gastrointestinal:  Negative for vomiting.     Objective:     Vital Signs (Most Recent):  Temp: 98.1 °F (36.7 °C) (10/27/23 0800)  Pulse: 71 (10/27/23 1448)  Resp: 20 (10/27/23 0847)  BP: (!) 172/81 (10/27/23 0929)  SpO2: (!) 92 % (10/27/23 1448) Vital Signs (24h Range):  Temp:  [97.9 °F (36.6 °C)-98.1 °F (36.7 °C)] 98.1 °F (36.7 °C)  Pulse:  [65-84] 71  Resp:  [18-33] 20  SpO2:  [83 %-97 %] 92 %  BP: (157-197)/(72-84) 172/81     Weight: 68 kg (149 lb 14.6 oz)  Body mass index is 22.14 kg/m².    Intake/Output Summary (Last 24 hours) at 10/27/2023 1505  Last data filed at 10/27/2023 0508  Gross per 24 hour   Intake 600 ml   Output 0 ml   Net 600 ml           Physical Exam  Constitutional:       General: He is not in acute distress.     Appearance: Normal appearance.   HENT:      Head: Normocephalic and atraumatic.      Nose: No congestion or rhinorrhea.   Eyes:      General: No scleral icterus.     Extraocular Movements: Extraocular movements intact.   Cardiovascular:      Rate and Rhythm: Normal rate and regular rhythm.      Pulses: Normal pulses.      Heart sounds: Normal heart sounds.   Pulmonary:      Effort: Pulmonary effort is normal.      Breath sounds: No wheezing.      Comments: Airvo HFNC in place  Abdominal:      General: Abdomen is flat. There is no distension.      Palpations: Abdomen is soft.      Tenderness: There is no abdominal tenderness.   Genitourinary:     Penis: Normal.    Musculoskeletal:         General: No swelling.      Cervical back: Normal range of motion.   Skin:     General: Skin is warm.    Neurological:      General: No focal deficit present.      Mental Status: He is alert and oriented to person, place, and time.      Comments: [place] to self, place and year   Psychiatric:      Comments: In restraints              Significant Labs: All pertinent labs within the past 24 hours have been reviewed.    Significant Imaging: I have reviewed all pertinent imaging results/findings within the past 24 hours.

## 2023-10-27 NOTE — PLAN OF CARE
10/27/23 1132   Post-Acute Status   Post-Acute Authorization Placement   Post-Acute Placement Status Set-up Complete/Auth obtained   Coverage MEDICARE - MEDICARE PART A & B   Discharge Delays (!) Procedure Scheduling (IR, OR, Labs, Echo, Cath, Echo, EEG)   Discharge Plan   Discharge Plan A Return to nursing home   Discharge Plan B Return to Nursing Home     CM called  patients family  to discuss any changes in discharge planning.  Patients plan is to patient to return to [facility]. Barriers: soft wrists restraints, weaning O2 level of AirVo, patient is currently and  on 35/50 and decrease Cbgs.   No changes in DC plans. GERA: 10/31/23    CM  sent updates to [facility]  via Care port and spoke with Farida [admit coordinator].       Blanca Brown RN  Case Management  [facility]  [phone]

## 2023-10-27 NOTE — SUBJECTIVE & OBJECTIVE
"Interval HPI:   Overnight events: Hypoglycemia o/n - I was not notified of this. Since correction of low o/n his BG has been at goal.   Eatin%  Nausea: No  Hypoglycemia and intervention: Yes  Fever: No  TPN and/or TF: No  If yes, type of TF/TPN and rate: n/a    BP (!) 182/81 (BP Location: Right leg, Patient Position: Lying)   Pulse 76   Temp 98.4 °F (36.9 °C)   Resp 17   Ht 5' 9" (1.753 m)   Wt 68 kg (149 lb 14.6 oz)   SpO2 (!) 93%   BMI 22.14 kg/m²     Labs Reviewed and Include    Recent Labs   Lab 10/27/23  0507   *   CALCIUM 7.2*   ALBUMIN 2.0*   PROT 5.6*   *   K 4.1   CO2 23   CL 98   BUN 37*   CREATININE 3.2*   ALKPHOS 161*   ALT 22   AST 18   BILITOT 0.8     Lab Results   Component Value Date    WBC 3.04 (L) 10/27/2023    HGB 7.8 (L) 10/27/2023    HCT 24.6 (L) 10/27/2023    MCV 95 10/27/2023     10/27/2023     No results for input(s): "TSH", "FREET4" in the last 168 hours.  Lab Results   Component Value Date    HGBA1C 8.4 (H) 10/12/2023       Nutritional status:   Body mass index is 22.14 kg/m².  Lab Results   Component Value Date    ALBUMIN 2.0 (L) 10/27/2023    ALBUMIN 2.2 (L) 10/26/2023    ALBUMIN 2.1 (L) 10/25/2023     No results found for: "PREALBUMIN"    Estimated Creatinine Clearance: 23.6 mL/min (A) (based on SCr of 3.2 mg/dL (H)).    Accu-Checks  Recent Labs     10/26/23  1212 10/26/23  1515 10/26/23  1927 10/26/23  2219 10/27/23  0411 10/27/23  0412 10/27/23  0445 10/27/23  0614 10/27/23  0821 10/27/23  1203   POCTGLUCOSE 146* 253* 209* 120* 39* 41* 179* 111* 92 151*       Current Medications and/or Treatments Impacting Glycemic Control  Immunotherapy:    Immunosuppressants       None          Steroids:   Hormones (From admission, onward)      Start     Stop Route Frequency Ordered    10/23/23 0118  melatonin tablet 9 mg         -- Oral Nightly PRN 10/23/23 001          Pressors:    Autonomic Drugs (From admission, onward)      None          Hyperglycemia/Diabetes " Medications:   Antihyperglycemics (From admission, onward)      Start     Stop Route Frequency Ordered    10/28/23 0900  insulin detemir U-100 (Levemir) pen 4 Units         -- SubQ Daily 10/27/23 1054    10/26/23 1130  insulin aspart U-100 pen 4 Units         -- SubQ 3 times daily with meals 10/26/23 1016    10/25/23 2300  insulin aspart U-100 pen 0-5 Units         -- SubQ Every 4 hours PRN 10/25/23 1021

## 2023-10-27 NOTE — ASSESSMENT & PLAN NOTE
S/p IV d 20 drip in ICU  Recent Labs     10/27/23  0411 10/27/23  0412 10/27/23  0445 10/27/23  0614 10/27/23  0821 10/27/23  1203   POCTGLUCOSE 39* 41* 179* 111* 92 151*     -Currently on sliding scale insulin and insulin aspart with meals     Endocrinology is adjusting patient's dose.   - intermittent hypoglycemia- endocrine adjusting regimen

## 2023-10-27 NOTE — PROGRESS NOTES
HD completed, blood returned and needles pulled. Post bleeding rosio < 5 minutes, net uf removed 3 liters . Post B/P 189/84 , pulse 75, o2 sat  94% on high flow nasal cannula . Patient alert and stable   pt with

## 2023-10-27 NOTE — PROGRESS NOTES
[facility] - [facility]ensive Care ([place] [address])  Nephrology  Progress Note    Patient Name: Cosme Lewis  MRN: [id number]  Admission Date: 10/11/2023  Hospital Length of Stay: 16 days  Attending Provider: Cecy Sierra MD   Primary Care Physician: Eliecer Ohara III, MD  Principal Problem:Acute on chronic respiratory failure with hypoxia and hypercapnia    Subjective:     HPI: Cosme Lewis is a 57 yo male with PMHx of ESRD on iHD MWF who presents from his NH with AMS, hypoxia, and hypoglycemia.  He was placed on BiPAP and transferred to ICU for higher level of care.  CXR with evidence of pulmonary edema, BNP elevated > 3800.  Labs on chemistry without emergent electrolyte abnormalities consistent with ESRD status.  CBC without leukocytosis.  He was found to be COVID + and started on Broad spectrum Abx and steroids. He required D5W gtt for persistent hypoglycemia.  Nephrology has been consulted for ESRD management while IP.      Interval History:     No acute events overnight. Patient scheduled for bedside HD today.     Review of patient's allergies indicates:  No Active Allergies  Current Facility-Administered Medications   Medication Frequency    0.9%  NaCl infusion Once    acetaminophen tablet 650 mg Q6H PRN    apixaban tablet 5 mg BID    atorvastatin tablet 40 mg Daily    carvediloL tablet 12.5 mg BID WM    dextrose 10% bolus 125 mL 125 mL PRN    dextrose 10% bolus 250 mL 250 mL PRN    epoetin xavier-epbx injection 3,510 Units Every Mon, Wed, Fri    FLUoxetine capsule 40 mg Daily    fluticasone furoate-vilanteroL 100-25 mcg/dose diskus inhaler 1 puff Daily    glucose chewable tablet 16 g PRN    glucose chewable tablet 24 g PRN    hydrALAZINE injection 10 mg Q6H PRN    insulin aspart U-100 pen 0-5 Units Q4H PRN    insulin aspart U-100 pen 4 Units TIDWM    insulin detemir U-100 (Levemir) pen 2 Units BID    lisinopriL tablet 40 mg QHS    melatonin tablet 9 mg Nightly PRN    multivitamin tablet Daily     NIFEdipine 24 hr tablet 60 mg BID    sodium chloride 0.9% bolus 250 mL 250 mL PRN    sodium chloride 0.9% bolus 250 mL 250 mL PRN    tiotropium bromide 2.5 mcg/actuation inhaler 2 puff Daily       Objective:     Vital Signs (Most Recent):  Temp: 98.1 °F (36.7 °C) (10/27/23 0800)  Pulse: 76 (10/27/23 0929)  Resp: (!) 30 (10/27/23 0846)  BP: (!) 172/81 (10/27/23 0929)  SpO2: (!) 93 % (10/27/23 0846) Vital Signs (24h Range):  Temp:  [97.2 °F (36.2 °C)-98.9 °F (37.2 °C)] 98.1 °F (36.7 °C)  Pulse:  [65-84] 76  Resp:  [16-33] 30  SpO2:  [92 %-100 %] 93 %  BP: (157-197)/(72-95) 172/81     Weight: 68 kg (149 lb 14.6 oz) (10/27/23 0400)  Body mass index is 22.14 kg/m².  Body surface area is 1.82 meters squared.    I/O last 3 completed shifts:  In: 1610 [P.O.:1610]  Out: 2500 [Other:2500]     Physical Exam  Constitutional:       General: He is not in acute distress.     Appearance: Normal appearance.   HENT:      Head: Normocephalic and atraumatic.      Nose: No congestion or rhinorrhea.   Eyes:      General: No scleral icterus.     Extraocular Movements: Extraocular movements intact.   Cardiovascular:      Rate and Rhythm: Normal rate and regular rhythm.      Pulses: Normal pulses.      Heart sounds: Normal heart sounds.   Pulmonary:      Effort: Pulmonary effort is normal.      Breath sounds: No wheezing.      Comments: HFNC in place  Abdominal:      General: Abdomen is flat. There is no distension.      Palpations: Abdomen is soft.      Tenderness: There is no abdominal tenderness.   Genitourinary:     Penis: Normal.    Musculoskeletal:         General: No swelling.      Cervical back: Normal range of motion.   Skin:     General: Skin is warm.   Neurological:      General: No focal deficit present.      Mental Status: He is alert and oriented to person, place, and time.      Comments: [place] to self, place and year   Psychiatric:      Comments: In restraints           Significant Labs:  All labs within the past 24 hours  have been reviewed.     Significant Imaging:  Labs: Reviewed    Assessment/Plan:     Pulmonary  * Acute on chronic respiratory failure with hypoxia and hypercapnia  - defer to primary team     Renal/  ESRD (end stage renal disease)  [place] on [place]HD MWF  [facility] [place]  4 hours  EDW:  71 kg  UMA AVF      Plan/Recommendations:  -iHD today for metabolic clearance and volume removal.    -continue strict I/O's  -RFP daily  -renal diet when advanced, 1L fluid restrictions  -Phos WNL, no need for binders at this time    Anemia of chronic kidney disease;    Hemoglobin   Date Value Ref Range Status   10/27/2023 7.8 (L) 14.0 - 18.0 g/dL Final   10/26/2023 8.3 (L) 14.0 - 18.0 g/dL Final   10/25/2023 8.3 (L) 14.0 - 18.0 g/dL Final     Saturated Iron   Date Value Ref Range Status   08/22/2023 19 (L) 20 - 50 % Final   02/23/2023 19 (L) 20 - 50 % Final     Ferritin   Date Value Ref Range Status   08/22/2023 1,455 (H) 20.0 - 300.0 ng/mL Final   02/23/2023 1,803 (H) 20.0 - 300.0 ng/mL Final     Iron   Date Value Ref Range Status   08/22/2023 48 45 - 160 ug/dL Final   02/23/2023 39 (L) 45 - 160 ug/dL Final     TIBC   Date Value Ref Range Status   08/22/2023 249 (L) 250 - 450 ug/dL Final   02/23/2023 203 (L) 250 - 450 ug/dL Final     HB target 10-12  Epogen with HD    Mineral Bone Disease    Calcium   Date Value Ref Range Status   10/27/2023 7.2 (L) 8.7 - 10.5 mg/dL Final   10/26/2023 7.6 (L) 8.7 - 10.5 mg/dL Final     Phosphorus   Date Value Ref Range Status   10/27/2023 4.1 2.7 - 4.5 mg/dL Final   10/26/2023 3.4 2.7 - 4.5 mg/dL Final      Continue renal diet  No binders         Thank you for your consult. I will follow-up with patient. Please contact us if you have any additional questions.     Case discussed with attending. Attestation to follow.       Darinel Dawkins DO  Nephrology  [facility] - Int[facility] ([place])

## 2023-10-27 NOTE — ASSESSMENT & PLAN NOTE
Patient with Hypoxic Respiratory failure which is Acute.  he is not on home oxygen. Supplemental oxygen was provided and noted.  Signs/symptoms of respiratory failure include- respiratory distress. Contributing diagnoses includes - ARDS Labs and images were reviewed. Patient Has recent ABG, which has been reviewed. Will treat underlying causes and adjust management of respiratory failure as follows- continuing antibiotics and antivirals, adjustments of mechanical ventilation, HD and fluid regulation.      Extubated in ICU to 5L NC morning of 10/13. In no acute distress  VBG 7.2552  However mental status improving continues to be a safety concern pulling at NC  -Currently on comfort/airvo high flow, wean as tolerated.

## 2023-10-27 NOTE — SUBJECTIVE & OBJECTIVE
Interval History:     No acute events overnight. Patient scheduled for bedside HD today.     Review of patient's allergies indicates:  No Active Allergies  Current Facility-Administered Medications   Medication Frequency    0.9%  NaCl infusion Once    acetaminophen tablet 650 mg Q6H PRN    apixaban tablet 5 mg BID    atorvastatin tablet 40 mg Daily    carvediloL tablet 12.5 mg BID WM    dextrose 10% bolus 125 mL 125 mL PRN    dextrose 10% bolus 250 mL 250 mL PRN    epoetin xavier-epbx injection 3,510 Units Every Mon, Wed, Fri    FLUoxetine capsule 40 mg Daily    fluticasone furoate-vilanteroL 100-25 mcg/dose diskus inhaler 1 puff Daily    glucose chewable tablet 16 g PRN    glucose chewable tablet 24 g PRN    hydrALAZINE injection 10 mg Q6H PRN    insulin aspart U-100 pen 0-5 Units Q4H PRN    insulin aspart U-100 pen 4 Units TIDWM    insulin detemir U-100 (Levemir) pen 2 Units BID    lisinopriL tablet 40 mg QHS    melatonin tablet 9 mg Nightly PRN    multivitamin tablet Daily    NIFEdipine 24 hr tablet 60 mg BID    sodium chloride 0.9% bolus 250 mL 250 mL PRN    sodium chloride 0.9% bolus 250 mL 250 mL PRN    tiotropium bromide 2.5 mcg/actuation inhaler 2 puff Daily       Objective:     Vital Signs (Most Recent):  Temp: 98.1 °F (36.7 °C) (10/27/23 0800)  Pulse: 76 (10/27/23 0929)  Resp: (!) 30 (10/27/23 0846)  BP: (!) 172/81 (10/27/23 0929)  SpO2: (!) 93 % (10/27/23 0846) Vital Signs (24h Range):  Temp:  [97.2 °F (36.2 °C)-98.9 °F (37.2 °C)] 98.1 °F (36.7 °C)  Pulse:  [65-84] 76  Resp:  [16-33] 30  SpO2:  [92 %-100 %] 93 %  BP: (157-197)/(72-95) 172/81     Weight: 68 kg (149 lb 14.6 oz) (10/27/23 0400)  Body mass index is 22.14 kg/m².  Body surface area is 1.82 meters squared.    I/O last 3 completed shifts:  In: 1610 [P.O.:1610]  Out: 2500 [Other:2500]     Physical Exam  Constitutional:       General: He is not in acute distress.     Appearance: Normal appearance.   HENT:      Head: Normocephalic and atraumatic.       Nose: No congestion or rhinorrhea.   Eyes:      General: No scleral icterus.     Extraocular Movements: Extraocular movements intact.   Cardiovascular:      Rate and Rhythm: Normal rate and regular rhythm.      Pulses: Normal pulses.      Heart sounds: Normal heart sounds.   Pulmonary:      Effort: Pulmonary effort is normal.      Breath sounds: No wheezing.      Comments: HFNC in place  Abdominal:      General: Abdomen is flat. There is no distension.      Palpations: Abdomen is soft.      Tenderness: There is no abdominal tenderness.   Genitourinary:     Penis: Normal.    Musculoskeletal:         General: No swelling.      Cervical back: Normal range of motion.   Skin:     General: Skin is warm.   Neurological:      General: No focal deficit present.      Mental Status: He is alert and oriented to person, place, and time.      Comments: [place] to self, place and year   Psychiatric:      Comments: In restraints           Significant Labs:  All labs within the past 24 hours have been reviewed.     Significant Imaging:  Labs: Reviewed

## 2023-10-27 NOTE — PROGRESS NOTES
Bedside HD Initiated. Cannulated upper left arm graft with 15 gauge needles . BfR 400/. Uf net goal 3 liters as tolerated. B/P 187/71, pulse 74, o2 sat on high flow nasal cannula 93%.  Patient awake and alert.

## 2023-10-27 NOTE — RESPIRATORY THERAPY
"RAPID RESPONSE RESPIRATORY THERAPY PROACTIVE ROUNDING NOTE             Time of visit: 0847     Code Status: Full Code   : 1965  Bed: 73627/34423 A:   MRN: [id number]  Time spent at the bedside: < 15 min    SITUATION    Evaluated patient for: HFNC Compliance     BACKGROUND    Patient has a past medical history of CHF (congestive heart failure), Chronic kidney disease-mineral and bone disorder, Chronic respiratory failure, COPD (chronic obstructive pulmonary disease), Diabetes mellitus type 1, ESRD on dialysis, Hypertension, Hypervolemia, Hypoglycemia, Hyponatremia, Hyponatremia, Other insomnia, Recurrent major depression, SOB (shortness of breath), and Unspecified lack of coordination.    24 Hours Vitals Range:  Temp:  [97.9 °F (36.6 °C)-98.9 °F (37.2 °C)]   Pulse:  [65-84]   Resp:  [18-33]   BP: (157-197)/(72-84)   SpO2:  [83 %-99 %]     Labs:    Recent Labs     10/25/23  0423 10/26/23  0815 10/27/23  0507    136 132*   K 4.6 3.9 4.1   CL 98 101 98   CO2 26 22* 23   BUN 51* 30* 37*   CREATININE 3.8* 2.8* 3.2*   * 136* 131*   PHOS 4.8* 3.4 4.1   MG 2.0 2.0 2.0        No results for input(s): "PH", "PCO2", "PO2", "HCO3", "POCSATURATED", "BE" in the last 72 hours.    ASSESSMENT/INTERVENTIONS    Patient resting comfortably. No respiratory concerns at this time.    Last VS   Temp: 98.1 °F (36.7 °C) (10/27 0800)  Pulse: 77 (10/27 1056)  Resp: 20 (10/27 0847)  BP: 172/81 (10/27 0929)  SpO2: 83 % (10/27 1056)    Level of Consciousness: Level of Consciousness (AVPU): alert  Respiratory Effort: Respiratory Effort: Normal, Unlabored Expansion/Accessory Muscle Usage: Expansion/Accessory Muscles/Retractions: no use of accessory muscles, no retractions, expansion symmetric  All Lung Field Breath Sounds: All Lung Fields Breath Sounds: Anterior:, Lateral:, diminished  O2 Device/Concentration: 35L/50%  Was the O2 device able to be weaned? No  Ambu at bedside:      Active Orders   Respiratory Care    Bipap QHS     " Frequency: QHS     Number of Occurrences: Until Specified     Order Questions:      Mode Bilevel      FiO2% 65      Inspiratory pressure: 18      Expiratory pressure: 8    Oxygen Continuous     Frequency: Continuous     Number of Occurrences: Until Specified     Order Questions:      Device type: High flow      Device: High Flow Nasal Cannula (6 -15 Liters)      LPM: 6-15      Titrate O2 per Oxygen Titration Protocol: Yes      To maintain SpO2 goal of: >= 90%      Notify MD of: Inability to achieve desired SpO2; Sudden change in patient status and requires 20% increase in FiO2; Patient requires >60% FiO2    Pulse Oximetry Continuous     Frequency: Continuous     Number of Occurrences: Until Specified       RECOMMENDATIONS    We recommend: RRT Recs: Continue POC per primary team.      FOLLOW-UP    Please call back the Rapid Response RT, Mana Sierra RRT at x [phone] for any questions or concerns.

## 2023-10-27 NOTE — CARE UPDATE
"RAPID RESPONSE NURSE CHART REVIEW        Chart Reviewed: 10/27/2023, 2:11 PM    MRN: [id number]  Bed: 81021/18744 A    Dx: Acute on chronic respiratory failure with hypoxia and hypercapnia    Cosme Lewis has a past medical history of CHF (congestive heart failure), Chronic kidney disease-mineral and bone disorder, Chronic respiratory failure, COPD (chronic obstructive pulmonary disease), Diabetes mellitus type 1, ESRD on dialysis, Hypertension, Hypervolemia, Hypoglycemia, Hyponatremia, Hyponatremia, Other insomnia, Recurrent major depression, SOB (shortness of breath), and Unspecified lack of coordination.    Last VS: BP (!) 172/81   Pulse 77   Temp 98.1 °F (36.7 °C)   Resp 20   Ht 5' 9" (1.753 m)   Wt 68 kg (149 lb 14.6 oz)   SpO2 (!) 83%   BMI 22.14 kg/m²     24H Vital Sign Range:  Temp:  [97.9 °F (36.6 °C)-98.1 °F (36.7 °C)]   Pulse:  [65-84]   Resp:  [18-33]   BP: (157-197)/(72-84)   SpO2:  [83 %-97 %]     Level of Consciousness (AVPU): alert    Recent Labs     10/25/23  0423 10/26/23  0815 10/27/23  0507   WBC 4.26 3.91 3.04*   HGB 8.3* 8.3* 7.8*   HCT 25.8* 26.1* 24.6*    194 171       Recent Labs     10/25/23  0423 10/26/23  0815 10/27/23  0507    136 132*   K 4.6 3.9 4.1   CL 98 101 98   CO2 26 22* 23   BUN 51* 30* 37*   CREATININE 3.8* 2.8* 3.2*   * 136* 131*   PHOS 4.8* 3.4 4.1   MG 2.0 2.0 2.0       OXYGEN:  Flow (L/min): 35  Oxygen Concentration (%): 50       MEWS score: 1    Rounding completed with charge ADELITA Moore. Reports pt maintaining SpO2 goals on current airvo settings. No additional concerns verbalized at this time. Instructed to call [facility] for further concerns or assistance.    Teressa Nobles RN       "

## 2023-10-27 NOTE — PROGRESS NOTES
"[facility] - [facility] ([facility]  Endocrinology  Progress Note    Admit Date: 10/11/2023     Cosme Lewis is a 57yo M with T2DM (on insulin at home), ESRD on HD, COPD, HFrEF, and HTN from [facility] who was brought in to the hospital on 10/11/2023 for evaluation of shortness of breath. Patient was diagnosed and treated for COVID pneumonia and sepsis. On admission patient was hypoglycemic felt to be secondary to administration of insulin at the nursing home. Hospitalization has been further complicated by acute encephalopathy.     Since admission patient has been treated with various steroids, levemir and novolog with some hypoglycemia. He received 10 days of high dose dexamethasone for covid. Last dose of dexamethasone was on 10/21. At time of endocrinology consult pt was getting Novolog moderate dose correction scale. Levemir was discontinued several days ago 2/2 hypoglycemia. Endocrinology is consulted for "Labile BG, request recs for new home regimen"    Since discontinuing levemir BG has been >70, although this AM 70s-80s. Getting a significant amount of SSI due to moderate dose correction being ordered instead of low dose in this pt with ESRD.             Regarding T2DM  LOV with NP [place] 2023  Diagnosed with DM around 4 years ago     Outpatient insulin regimen:  Levemir 8 units twice daily   Humalog 8 units plus correction scale before meals   Add correction scale as needed.  Blood sugar 201 to 250 add 2 units  Blood sugar 251 to 300 add 4 units  Blood sugar 301 to 350 add 6 units  Blood sugar greater than 350 add 8 units         Interval HPI:   Overnight events: Hypoglycemia o/n - I was not notified of this. Since correction of low o/n his BG has been at goal.   Eatin%  Nausea: No  Hypoglycemia and intervention: Yes  Fever: No  TPN and/or TF: No  If yes, type of TF/TPN and rate: n/a    BP (!) 182/81 (BP Location: Right leg, Patient Position: Lying)   Pulse 76   Temp " "98.4 °F (36.9 °C)   Resp 17   Ht 5' 9" (1.753 m)   Wt 68 kg (149 lb 14.6 oz)   SpO2 (!) 93%   BMI 22.14 kg/m²     Labs Reviewed and Include    Recent Labs   Lab 10/27/23  0507   *   CALCIUM 7.2*   ALBUMIN 2.0*   PROT 5.6*   *   K 4.1   CO2 23   CL 98   BUN 37*   CREATININE 3.2*   ALKPHOS 161*   ALT 22   AST 18   BILITOT 0.8     Lab Results   Component Value Date    WBC 3.04 (L) 10/27/2023    HGB 7.8 (L) 10/27/2023    HCT 24.6 (L) 10/27/2023    MCV 95 10/27/2023     10/27/2023     No results for input(s): "TSH", "FREET4" in the last 168 hours.  Lab Results   Component Value Date    HGBA1C 8.4 (H) 10/12/2023       Nutritional status:   Body mass index is 22.14 kg/m².  Lab Results   Component Value Date    ALBUMIN 2.0 (L) 10/27/2023    ALBUMIN 2.2 (L) 10/26/2023    ALBUMIN 2.1 (L) 10/25/2023     No results found for: "PREALBUMIN"    Estimated Creatinine Clearance: 23.6 mL/min (A) (based on SCr of 3.2 mg/dL (H)).    Accu-Checks  Recent Labs     10/26/23  1212 10/26/23  1515 10/26/23  1927 10/26/23  2219 10/27/23  0411 10/27/23  0412 10/27/23  0445 10/27/23  0614 10/27/23  0821 10/27/23  1203   POCTGLUCOSE 146* 253* 209* 120* 39* 41* 179* 111* 92 151*       Current Medications and/or Treatments Impacting Glycemic Control  Immunotherapy:    Immunosuppressants       None          Steroids:   Hormones (From admission, onward)      Start     Stop Route Frequency Ordered    10/23/23 0118  melatonin tablet 9 mg         -- Oral Nightly PRN 10/23/23 0018          Pressors:    Autonomic Drugs (From admission, onward)      None          Hyperglycemia/Diabetes Medications:   Antihyperglycemics (From admission, onward)      Start     Stop Route Frequency Ordered    10/28/23 0900  insulin detemir U-100 (Levemir) pen 4 Units         -- SubQ Daily 10/27/23 1054    10/26/23 1130  insulin aspart U-100 pen 4 Units         -- SubQ 3 times daily with meals 10/26/23 1016    10/25/23 2300  insulin aspart U-100 pen 0-5 " Units         -- SubQ Every 4 hours PRN 10/25/23 6592            ASSESSMENT and PLAN    Pulmonary  * Acute on chronic respiratory failure with hypoxia and hypercapnia  COVID dx on admission -- was on high dose steroids which was increasing insulin requirement, but last dose of dex was 10/21  Management per primary     Renal/  ESRD (end stage renal disease)  ESRD on HD  Cautious with insulin adjustments and avoid stacking  Likely reason for hypoglycemia     Endocrine  Type 2 diabetes mellitus with renal complication  Endocrinology consulted for BG management.   Inpatient BG goal 140-180    BG highly variable. Very labile/insulin sensitive. Will tolerate intermittent highs due to propensity for hypoglycemia.   Hypoglycemia o/n after 2u levemir at bedtime and no aspart near the low.     - I planned to switch levemir 2u BID to 4u daily given in the morning, since he is usually high during the day and has lows o/n if he does have lows. For now I discontinued levemir order. He received 2u Levemir this AM -- so I will monitor his BG throughout the day/night and then make a decision on whether or not to add levemir tomorrow morning based on BG. Ultimately he may not require any basal insulin.  - Novolog (Insulin Aspart) 4 units tid before meals and low dose SSI prn (180/50) -- correct above 180 instead of 150 given hypoglycemia   - He may need low dose levemir added back but for now would monitor on LDC only given severe hypoglycemia earlier this admission   - BG checks AC/HS/0200 or q4h if not eating meals   - Hypoglycemia protocol in place  - If blood glucose greater than 300, please ask patient not to eat food or drink anything other than water until correctional insulin has brought it back below 250  - Minimize between meal snacking if able    ** Please notify Endocrine for any change and/or advance in diet**  ** Please call Endocrine for any BG related issues **    Discharge Planning:   TBD. Please notify  endocrinology prior to discharge.      Hypoglycemia  Suspect hypoglycemia was due to insulin doses in ESRD pt with reduced clearance  Had another hypoglycemic episode very early AM 10/27 after only receiving 2u levemir at bedtime and no aspart after dinnertime. Will hold levemir for now and monitor BG. If levemir is needed will plan to dose in morning only and not at bedtime.  Do not suspect pathologic endogenous insulin production in this pt at this time - would hold on off on hypoglycemia w/u unless persistent hypoglycemia without any insulin           Isabel Middleton MD  Endocrinology  [facility] - Int[facility] ([place])

## 2023-10-28 NOTE — PROGRESS NOTES
[facility] - Intensive Care ([address] Medicine  Progress Note    Patient Name: Cosme Lewis  MRN: [id number]  Patient Class: IP- Inpatient   Admission Date: 10/11/2023  Length of Stay: 17 days  Attending Physician: Cecy Sierra MD  Primary Care Provider: Eliecer Ohara III, MD        Subjective:     Principal Problem:Acute on chronic respiratory failure with hypoxia and hypercapnia        HPI:  Patient is very pleasant, was getting dialysis.  Denies any complaints.  Reports that his shortness of breath is better than it has ever been.  Denies any chest pain or abdominal pain, and reports the only thing that is bothersome is his Sob. He reiterated that it is better than it is ever been      Overview/Hospital Course:  58 year old male with PMH notable for COPD, HFrEF (45% and DD), ESRD on dialysis - MWF, HTN, DM presents via EMS from nursing home for altered mental status and hypoxia.  Per EMS, patient was found to have glucose of 38, he received D50 with improvement in his glucose.  He also had SpO2 58% which improved with 15 L nasal cannula.   ED work up revealed VBG 7.27/61 and he was subsequently placed on bipap for work of breathing. He was persistently hypoglycemic with BG 59 --> 34 despite IV dextrose, therefore he was started on a continuous dextrose infusion. Other lab work up revealed WBC 3, stable, H//H 9.5/29, Na 143, CO2 20, Cr/BUN 3.8/38, elevated AST/ALT/alk phos, and troponin 0.174 with no ischemic changes on EKG. He was given IV rocephin and doxycyline. Chest XR with bilateral opacifications R > L.  Critical care consulted for acute hypoxemic respiratory failure and NIPPV. Found to have COVID pneumonia with overlapping community acquired pneumonia.   ICU Course:  In the MICU, the patient arrived on bipap but was found to still be hypoxic which initially improved after adjusting his bipap settings. He continued to be altered and tried occasionally became combative requiring precedex which  was affective when maxed out at 1.4 mcg/kg/hour. During this time he was also found to have worsening hypoglycemia which required continuous D10 administration at a max rate of 200 cc/hour. Eventually his oxygen status started declining and the decision was made to intubate the patient. It is believed that worsening of his oxygen status was in part due the large volume of D10 he received to maintain his blood sugars. For this reason, it was decided that D20 at a slower rate would be more appropriate, and for this a central line was placed. Additionally, he received a round of hemodialysis. He is receiving remdesivir for COVID and vacomycin/zosyn/doxycycline for community acquired pneumonia coverage/concerning chest x-ray. The patient's sugars started rising on the D20 and eventually he started becoming hyperglycemic  to the 220s which led to the D20 being stopped for the time being and since that time the sugars have been relatively normoglycemic. His abdomen was found to be slightly more distended but KUB was negative for bowel obstruction, likely suggesting ascites as the source.  On 10/12 after passing SAT and SBT, the decision was made to extubate the patient which was done successfully. All sedatives were turned off at this time as well. He was placed on high flow at the time and has been sating well. He continued to receive antibiotics as well as antivirals for CAP coverage/COVID respectively which was able to help improve his likely pneumonia vs ARDS. Between 10/14 - 10/17 he went back and forth between requiring high flow and bipap, however starting on 10/17 he was able to tolerate nasal canula.     Stepped down to hospital medicine. Having intermittent delirium overnight pulling at O2, requiring placement of restraints. Transitioned to HFNC given desaturations. Delirium precautions added.   Endocrine following and adjusting insulin given intermittent hypoglycemia resolved. Weaned to regular NC and out of  restraints.       Interval History: No hypoglycemia overnight, long acting insulin was stopped. BG within goal. Endocrine following and adjusting regimen.     Weaned from airvo to regular NC  3L.   Out of restraints and stable. Delirium precautions in place.       Review of Systems   Constitutional:  Negative for fever.   Respiratory:  Positive for shortness of breath. Negative for cough.    Gastrointestinal:  Negative for vomiting.     Objective:     Vital Signs (Most Recent):  Temp: 98 °F (36.7 °C) (10/28/23 1200)  Pulse: 72 (10/28/23 1200)  Resp: 18 (10/28/23 1200)  BP: 133/75 (10/28/23 1200)  SpO2: 95 % (10/28/23 1247) Vital Signs (24h Range):  Temp:  [97.9 °F (36.6 °C)-98.8 °F (37.1 °C)] 98 °F (36.7 °C)  Pulse:  [69-80] 72  Resp:  [16-33] 18  SpO2:  [89 %-100 %] 95 %  BP: (122-191)/(60-86) 133/75     Weight: 68 kg (149 lb 14.6 oz)  Body mass index is 22.14 kg/m².    Intake/Output Summary (Last 24 hours) at 10/28/2023 1358  Last data filed at 10/28/2023 0300  Gross per 24 hour   Intake 300 ml   Output 3500 ml   Net -3200 ml           Physical Exam  Constitutional:       General: He is not in acute distress.     Appearance: Normal appearance.   HENT:      Head: Normocephalic and atraumatic.      Nose: No congestion or rhinorrhea.   Eyes:      General: No scleral icterus.     Extraocular Movements: Extraocular movements intact.   Cardiovascular:      Rate and Rhythm: Normal rate and regular rhythm.      Pulses: Normal pulses.      Heart sounds: Normal heart sounds.   Pulmonary:      Effort: Pulmonary effort is normal.      Breath sounds: No wheezing.      Comments: Regular NC in place  Abdominal:      General: Abdomen is flat. There is no distension.      Palpations: Abdomen is soft.      Tenderness: There is no abdominal tenderness.   Genitourinary:     Penis: Normal.    Musculoskeletal:         General: No swelling.      Cervical back: Normal range of motion.   Skin:     General: Skin is warm.   Neurological:       General: No focal deficit present.      Mental Status: He is alert and oriented to person, place, and time.      Comments: Oriented to self, place and year   Psychiatric:      Comments: Out of restraints             Significant Labs: All pertinent labs within the past 24 hours have been reviewed.    Significant Imaging: I have reviewed all pertinent imaging results/findings within the past 24 hours.      Assessment/Plan:      * Acute on chronic respiratory failure with hypoxia and hypercapnia  Patient with Hypercapnic and Hypoxic Respiratory failure which is Chronic.  he is not on home oxygen. Supplemental oxygen was provided and noted-  Signs/symptoms of respiratory failure include- tachypnea, increased work of breathing and use of accessory muscles. Contributing diagnoses includes - CHF, COPD and fluid volume excess Labs and images were reviewed. Patient Has recent ABG, which has been reviewed. Will treat underlying causes and adjust management of respiratory failure as follows-      58 year old male with multiple medical problems who presented to [facility] ED via EMS for AMS, hypoglycemia, and hypoxia placed on NIPPV. Per chart review patient with room air saturation in 50s which improved with HFNC, however, he remained tachypnic with accessory muscle use, therefore he was placed on NIPPV. Unclear if patient requires oxygen at baseline. Previous hospitalizations with oxygen use. CXR with bilateral opacifications R > L and BNP elevated > 4900.      Admitted to ICU, Intubated and mechanically ventilated, extubated to NC 10/13, sating well  Chest x-ray concerning for pneumonia vs ARDS     - Treated for COVID-19 pneumonia: Remdesivir completed. Dexamethasone 6 mg daily completed  - CAP coverage with Vanc/Zosyn completed   - Received HDper renal recs  - Duoneb nebulizer, tiotropium and Fluticasone furoate / Vilanterol   - supplemental O2, wean as tolerated. Currently on regular NC    Dyspnea  - see resp failure  - cont  supportive NC, wean as tolerated      Acute respiratory failure with hypoxia and hypercapnia  Patient with Hypoxic Respiratory failure which is Acute.  he is not on home oxygen. Supplemental oxygen was provided and noted.  Signs/symptoms of respiratory failure include- respiratory distress. Contributing diagnoses includes - ARDS Labs and images were reviewed. Patient Has recent ABG, which has been reviewed. Will treat underlying causes and adjust management of respiratory failure as follows- continuing antibiotics and antivirals, adjustments of mechanical ventilation, HD and fluid regulation.      Extubated in ICU to 5L NC morning of 10/13. In no acute distress  VBG 7.2552  However mental status improving, now out of restraints   -Currently on regular NC, wean as tolerated        Type 2 diabetes mellitus with renal complication  Recent Labs     10/27/23  1709 10/27/23  2015 10/28/23  0050 10/28/23  0456 10/28/23  0810 10/28/23  1220   POCTGLUCOSE 192* 220* 200* 196* 148* 262*     On low dose SS  Hypoglycemia protocol      History of pulmonary embolism  Diagnosed 10/2022  Repeat CT 3/2023 with resolution     - cont home eliquis     Hypoglycemia  S/p IV d 20 drip in ICU  Recent Labs     10/27/23  1709 10/27/23  2015 10/28/23  0050 10/28/23  0456 10/28/23  0810 10/28/23  1220   POCTGLUCOSE 192* 220* 200* 196* 148* 262*     -Currently on sliding scale insulin and insulin aspart with meals     Endocrinology is adjusting patient's dose.   - intermittent hypoglycemia- endocrine adjusting regimen   - long acting insulin stopped and BG within range, cont short acting       COPD (chronic obstructive pulmonary disease)  --Duo nebs Q4wake  --Currently on regular nasal canula  --Fluticasone furoate / Vilanterol and Salmeterol     HLD (hyperlipidemia)   LFT's have improved, cont atorvastatin 40 mg daily      Anemia in ESRD (end-stage renal disease)  - H/H stable  -- CBC daily and trend   -- Transfuse for hgb < 7   -- Receives  EPO with iHD    Chronic diastolic heart failure    ECHO 09/23:        Left Ventricle: The left ventricle is normal in size. Increased wall thickness. There is concentric hypertrophy. Mild global hypokinesis present. There is mildly reduced systolic function with a visually estimated ejection fraction of 40 - 45%. There is indeterminate diastolic function.    Right Ventricle: Normal right ventricular cavity size. Right ventricle wall motion  is normal. Systolic function is borderline low.    Left Atrium: Left atrium is severely dilated.    Tricuspid Valve: There is mild regurgitation.    Pericardium: There is a trivial effusion. Echodensity seen adjacent to the visceral pericardium of uncertain significance. This may be pericardial fat although other etiologies not excluded. Correlate clinically.    PASP is at least 46 mmHg.     BNP > 4900     --Volume removal per iHD. Appreciate nephrology assistance   -- Cont home nifedipine 60 mg qd and lisinopril 40 mg qd    ESRD (end stage renal disease)  Nephrology following  Outpatient HD [facility]: [facility]   HD tx days: MWF   HD tx time: 4hrs   HD access: LUE AVF   HD modality: iHD   Residual urine: Anuric   Estimated Creatinine Clearance: 33.8 mL/min (A) (based on SCr of 2.3 mg/dL (H)).   Toelrating iHD well     -- Nephrology consulted for iHD management. Appreciate their assistance   -- CMP daily and trend   -- Avoid nephrotoxins   -- Renally dose all medications to appropriate GFR / CrCl   -- Hgb > 7 and MAP > 65 goals       VTE Risk Mitigation (From admission, onward)         Ordered     apixaban tablet 5 mg  2 times daily         10/26/23 2023     IP VTE HIGH RISK PATIENT  Once         10/11/23 0248     Place sequential compression device  Until discontinued         10/11/23 0248                Discharge Planning   GERA: 10/31/2023     Code Status: Full Code   Is the patient medically ready for discharge?: No    Reason for patient still in hospital (select all  that apply): Patient trending condition  Discharge Plan A: Return to nursing home   Discharge Delays: (!) Procedure Scheduling (IR, OR, Labs, Echo, Cath, Echo, EEG)              Cecy Sierra MD  Department of Hospital Medicine   [facility] - Intensive Care ([place]-[facility])

## 2023-10-28 NOTE — NURSING
Alert and confused. Agitated after dialysis. Upper wrist restraints intact. Patient pulls O2 off and desats. Safety measures in place.

## 2023-10-28 NOTE — SUBJECTIVE & OBJECTIVE
Interval History: No hypoglycemia overnight, long acting insulin was stopped. BG within goal. Endocrine following and adjusting regimen.     Weaned from airvo to regular NC  3L.   Out of restraints and stable. Delirium precautions in place.       Review of Systems   Constitutional:  Negative for fever.   Respiratory:  Positive for shortness of breath. Negative for cough.    Gastrointestinal:  Negative for vomiting.     Objective:     Vital Signs (Most Recent):  Temp: 98 °F (36.7 °C) (10/28/23 1200)  Pulse: 72 (10/28/23 1200)  Resp: 18 (10/28/23 1200)  BP: 133/75 (10/28/23 1200)  SpO2: 95 % (10/28/23 1247) Vital Signs (24h Range):  Temp:  [97.9 °F (36.6 °C)-98.8 °F (37.1 °C)] 98 °F (36.7 °C)  Pulse:  [69-80] 72  Resp:  [16-33] 18  SpO2:  [89 %-100 %] 95 %  BP: (122-191)/(60-86) 133/75     Weight: 68 kg (149 lb 14.6 oz)  Body mass index is 22.14 kg/m².    Intake/Output Summary (Last 24 hours) at 10/28/2023 1358  Last data filed at 10/28/2023 0300  Gross per 24 hour   Intake 300 ml   Output 3500 ml   Net -3200 ml           Physical Exam  Constitutional:       General: He is not in acute distress.     Appearance: Normal appearance.   HENT:      Head: Normocephalic and atraumatic.      Nose: No congestion or rhinorrhea.   Eyes:      General: No scleral icterus.     Extraocular Movements: Extraocular movements intact.   Cardiovascular:      Rate and Rhythm: Normal rate and regular rhythm.      Pulses: Normal pulses.      Heart sounds: Normal heart sounds.   Pulmonary:      Effort: Pulmonary effort is normal.      Breath sounds: No wheezing.      Comments: Regular NC in place  Abdominal:      General: Abdomen is flat. There is no distension.      Palpations: Abdomen is soft.      Tenderness: There is no abdominal tenderness.   Genitourinary:     Penis: Normal.    Musculoskeletal:         General: No swelling.      Cervical back: Normal range of motion.   Skin:     General: Skin is warm.   Neurological:      General: No  focal deficit present.      Mental Status: He is alert and oriented to person, place, and time.      Comments: Oriented to self, place and year   Psychiatric:      Comments: Out of restraints             Significant Labs: All pertinent labs within the past 24 hours have been reviewed.    Significant Imaging: I have reviewed all pertinent imaging results/findings within the past 24 hours.

## 2023-10-28 NOTE — ASSESSMENT & PLAN NOTE
Patient with Hypercapnic and Hypoxic Respiratory failure which is Chronic.  he is not on home oxygen. Supplemental oxygen was provided and noted-  Signs/symptoms of respiratory failure include- tachypnea, increased work of breathing and use of accessory muscles. Contributing diagnoses includes - CHF, COPD and fluid volume excess Labs and images were reviewed. Patient Has recent ABG, which has been reviewed. Will treat underlying causes and adjust management of respiratory failure as follows-      58 year old male with multiple medical problems who presented to [facility] ED via EMS for AMS, hypoglycemia, and hypoxia placed on NIPPV. Per chart review patient with room air saturation in 50s which improved with HFNC, however, he remained tachypnic with accessory muscle use, therefore he was placed on NIPPV. Unclear if patient requires oxygen at baseline. Previous hospitalizations with oxygen use. CXR with bilateral opacifications R > L and BNP elevated > 4900.      Admitted to ICU, Intubated and mechanically ventilated, extubated to NC 10/13, sating well  Chest x-ray concerning for pneumonia vs ARDS     - Treated for COVID-19 pneumonia: Remdesivir completed. Dexamethasone 6 mg daily completed  - CAP coverage with Vanc/Zosyn completed   - Received HDper renal recs  - Duoneb nebulizer, tiotropium and Fluticasone furoate / Vilanterol   - supplemental O2, wean as tolerated. Currently on regular NC

## 2023-10-28 NOTE — ASSESSMENT & PLAN NOTE
S/p IV d 20 drip in ICU  Recent Labs     10/27/23  1709 10/27/23  2015 10/28/23  0050 10/28/23  0456 10/28/23  0810 10/28/23  1220   POCTGLUCOSE 192* 220* 200* 196* 148* 262*     -Currently on sliding scale insulin and insulin aspart with meals     Endocrinology is adjusting patient's dose.   - intermittent hypoglycemia- endocrine adjusting regimen   - long acting insulin stopped and BG within range, cont short acting

## 2023-10-28 NOTE — ASSESSMENT & PLAN NOTE
Patient with Hypoxic Respiratory failure which is Acute.  he is not on home oxygen. Supplemental oxygen was provided and noted.  Signs/symptoms of respiratory failure include- respiratory distress. Contributing diagnoses includes - ARDS Labs and images were reviewed. Patient Has recent ABG, which has been reviewed. Will treat underlying causes and adjust management of respiratory failure as follows- continuing antibiotics and antivirals, adjustments of mechanical ventilation, HD and fluid regulation.      Extubated in ICU to 5L NC morning of 10/13. In no acute distress  VBG 7.2552  However mental status improving, now out of restraints   -Currently on regular NC, wean as tolerated

## 2023-10-28 NOTE — SUBJECTIVE & OBJECTIVE
"Interval HPI:   Overnight events: No hypoglycemia last 24h since stopping basal insulin. BG mostly at inpatient goal with several outliers in low-mid 200s.  Eatin%  Nausea: No  Hypoglycemia and intervention: No  Fever: No  TPN and/or TF: No  If yes, type of TF/TPN and rate: n/a    /75   Pulse 72   Temp 98 °F (36.7 °C) (Oral)   Resp 18   Ht 5' 9" (1.753 m)   Wt 68 kg (149 lb 14.6 oz)   SpO2 95%   BMI 22.14 kg/m²     Labs Reviewed and Include    Recent Labs   Lab 10/28/23  0550   *   CALCIUM 7.4*   ALBUMIN 2.1*   PROT 5.8*   *   K 4.4   CO2 22*   CL 99   BUN 26*   CREATININE 2.7*   ALKPHOS 188*   ALT 24   AST 24   BILITOT 0.7     Lab Results   Component Value Date    WBC 3.57 (L) 10/28/2023    HGB 8.1 (L) 10/28/2023    HCT 25.7 (L) 10/28/2023    MCV 95 10/28/2023     10/28/2023     No results for input(s): "TSH", "FREET4" in the last 168 hours.  Lab Results   Component Value Date    HGBA1C 8.4 (H) 10/12/2023       Nutritional status:   Body mass index is 22.14 kg/m².  Lab Results   Component Value Date    ALBUMIN 2.1 (L) 10/28/2023    ALBUMIN 2.0 (L) 10/27/2023    ALBUMIN 2.2 (L) 10/26/2023     No results found for: "PREALBUMIN"    Estimated Creatinine Clearance: 28 mL/min (A) (based on SCr of 2.7 mg/dL (H)).    Accu-Checks  Recent Labs     10/27/23  0445 10/27/23  0614 10/27/23  0821 10/27/23  1203 10/27/23  1709 10/27/23  2015 10/28/23  0050 10/28/23  0456 10/28/23  0810 10/28/23  1220   POCTGLUCOSE 179* 111* 92 151* 192* 220* 200* 196* 148* 262*       Current Medications and/or Treatments Impacting Glycemic Control  Immunotherapy:    Immunosuppressants       None          Steroids:   Hormones (From admission, onward)      Start     Stop Route Frequency Ordered    10/23/23 0118  melatonin tablet 9 mg         -- Oral Nightly PRN 10/23/23 001          Pressors:    Autonomic Drugs (From admission, onward)      None          Hyperglycemia/Diabetes Medications: "   Antihyperglycemics (From admission, onward)      Start     Stop Route Frequency Ordered    10/26/23 1130  insulin aspart U-100 pen 4 Units         -- SubQ 3 times daily with meals 10/26/23 1016    10/25/23 2300  insulin aspart U-100 pen 0-5 Units         -- SubQ Every 4 hours PRN 10/25/23 0140

## 2023-10-28 NOTE — ASSESSMENT & PLAN NOTE
Nephrology following  Outpatient HD unit: [facility]   HD tx days: MWF   HD tx time: 4hrs   HD access: LUE AVF   HD modality: iHD   Residual urine: Anuric   Estimated Creatinine Clearance: 33.8 mL/min (A) (based on SCr of 2.3 mg/dL (H)).   Toelrating iHD well     -- Nephrology consulted for iHD management. Appreciate their assistance   -- CMP daily and trend   -- Avoid nephrotoxins   -- Renally dose all medications to appropriate GFR / CrCl   -- Hgb > 7 and MAP > 65 goals

## 2023-10-28 NOTE — ASSESSMENT & PLAN NOTE
--Duo nebs Q4wake  --Currently on regular nasal canula  --Fluticasone furoate / Vilanterol and Salmeterol

## 2023-10-28 NOTE — PLAN OF CARE
Problem: Adult Inpatient Plan of Care  Goal: Plan of Care Review  Outcome: Ongoing, Progressing  Goal: Patient-Specific Goal (Individualized)  Outcome: Ongoing, Progressing  Goal: Absence of Hospital-Acquired Illness or Injury  Outcome: Ongoing, Progressing  Goal: Optimal Comfort and Wellbeing  Outcome: Ongoing, Progressing  Goal: Readiness for Transition of Care  Outcome: Ongoing, Progressing     Problem: Diabetes Comorbidity  Goal: Blood Glucose Level Within Targeted Range  Outcome: Ongoing, Progressing     Problem: Impaired Wound Healing  Goal: Optimal Wound Healing  Outcome: Ongoing, Progressing     Problem: Device-Related Complication Risk (Hemodialysis)  Goal: Safe, Effective Therapy Delivery  Outcome: Ongoing, Progressing     Problem: Hemodynamic Instability (Hemodialysis)  Goal: Effective Tissue Perfusion  Outcome: Ongoing, Progressing     Problem: Infection (Hemodialysis)  Goal: Absence of Infection Signs and Symptoms  Outcome: Ongoing, Progressing     Problem: Fall Injury Risk  Goal: Absence of Fall and Fall-Related Injury  Outcome: Ongoing, Progressing     Problem: Infection  Goal: Absence of Infection Signs and Symptoms  Outcome: Ongoing, Progressing     Problem: Skin Injury Risk Increased  Goal: Skin Health and Integrity  Outcome: Ongoing, Progressing     Problem: Coping Ineffective  Goal: Effective Coping  Outcome: Ongoing, Progressing     Problem: Gas Exchange Impaired  Goal: Optimal Gas Exchange  Outcome: Ongoing, Progressing     Problem: Restraint, Nonbehavioral (Nonviolent)  Goal: Absence of Harm or Injury  Outcome: Ongoing, Progressing

## 2023-10-28 NOTE — ASSESSMENT & PLAN NOTE
Endocrinology consulted for BG management.   Inpatient BG goal 140-180    BG highly variable. Very labile/insulin sensitive. Will tolerate intermittent highs due to propensity for hypoglycemia.     - Hypoglycemia o/n after 2u levemir at bedtime and no aspart near the low. Thus, discontinued levemir with last dose 10/27 AM 2u. Will continue to monitor with scheduled and prn novolog without basal insulin. Ultimately, if he does require basal insulin at all, would dose once daily given in the morning in order to avoid overnight lows.  - Novolog (Insulin Aspart) 4 units tid before meals and low dose SSI prn (180/50) -- correct above 180 instead of 150 given hypoglycemia   - He may need low dose levemir added back but for now would monitor on LDC only given severe hypoglycemia earlier this admission   - BG checks AC/HS/0200 or q4h if not eating meals   - Hypoglycemia protocol in place  - If blood glucose greater than 300, please ask patient not to eat food or drink anything other than water until correctional insulin has brought it back below 250  - Minimize between meal snacking if able    ** Please notify Endocrine for any change and/or advance in diet**  ** Please call Endocrine for any BG related issues **    Discharge Planning:   TBD. Please notify endocrinology prior to discharge.

## 2023-10-28 NOTE — PROGRESS NOTES
"[facility] - Intensive Care ([facility]-)  Endocrinology  Progress Note    Admit Date: 10/11/2023     Cosme Lewis is a 57yo M with T2DM (on insulin at home), ESRD on HD, COPD, HFrEF, and HTN from [facility] who was brought in to the hospital on 10/11/2023 for evaluation of shortness of breath. Patient was diagnosed and treated for COVID pneumonia and sepsis. On admission patient was hypoglycemic felt to be secondary to administration of insulin at the nursing home. Hospitalization has been further complicated by acute encephalopathy.     Since admission patient has been treated with various steroids, levemir and novolog with some hypoglycemia. He received 10 days of high dose dexamethasone for covid. Last dose of dexamethasone was on 10/21. At time of endocrinology consult pt was getting Novolog moderate dose correction scale. Levemir was discontinued several days ago 2/2 hypoglycemia. Endocrinology is consulted for "Labile BG, request recs for new home regimen"    Since discontinuing levemir BG has been >70, although this AM 70s-80s. Getting a significant amount of SSI due to moderate dose correction being ordered instead of low dose in this pt with ESRD.             Regarding T2DM  LOV with NP Snow Calle 2023  Diagnosed with DM around 4 years ago     Outpatient insulin regimen:  Levemir 8 units twice daily   Humalog 8 units plus correction scale before meals   Add correction scale as needed.  Blood sugar 201 to 250 add 2 units  Blood sugar 251 to 300 add 4 units  Blood sugar 301 to 350 add 6 units  Blood sugar greater than 350 add 8 units         Interval HPI:   Overnight events: No hypoglycemia last 24h since stopping basal insulin. BG mostly at inpatient goal with several outliers in low-mid 200s.  Eatin%  Nausea: No  Hypoglycemia and intervention: No  Fever: No  TPN and/or TF: No  If yes, type of TF/TPN and rate: n/a    /75   Pulse 72   Temp 98 °F (36.7 °C) (Oral)   Resp 18   " "Ht 5' 9" (1.753 m)   Wt 68 kg (149 lb 14.6 oz)   SpO2 95%   BMI 22.14 kg/m²     Labs Reviewed and Include    Recent Labs   Lab 10/28/23  0550   *   CALCIUM 7.4*   ALBUMIN 2.1*   PROT 5.8*   *   K 4.4   CO2 22*   CL 99   BUN 26*   CREATININE 2.7*   ALKPHOS 188*   ALT 24   AST 24   BILITOT 0.7     Lab Results   Component Value Date    WBC 3.57 (L) 10/28/2023    HGB 8.1 (L) 10/28/2023    HCT 25.7 (L) 10/28/2023    MCV 95 10/28/2023     10/28/2023     No results for input(s): "TSH", "FREET4" in the last 168 hours.  Lab Results   Component Value Date    HGBA1C 8.4 (H) 10/12/2023       Nutritional status:   Body mass index is 22.14 kg/m².  Lab Results   Component Value Date    ALBUMIN 2.1 (L) 10/28/2023    ALBUMIN 2.0 (L) 10/27/2023    ALBUMIN 2.2 (L) 10/26/2023     No results found for: "PREALBUMIN"    Estimated Creatinine Clearance: 28 mL/min (A) (based on SCr of 2.7 mg/dL (H)).    Accu-Checks  Recent Labs     10/27/23  0445 10/27/23  0614 10/27/23  0821 10/27/23  1203 10/27/23  1709 10/27/23  2015 10/28/23  0050 10/28/23  0456 10/28/23  0810 10/28/23  1220   POCTGLUCOSE 179* 111* 92 151* 192* 220* 200* 196* 148* 262*       Current Medications and/or Treatments Impacting Glycemic Control  Immunotherapy:    Immunosuppressants       None          Steroids:   Hormones (From admission, onward)      Start     Stop Route Frequency Ordered    10/23/23 0118  melatonin tablet 9 mg         -- Oral Nightly PRN 10/23/23 0018          Pressors:    Autonomic Drugs (From admission, onward)      None          Hyperglycemia/Diabetes Medications:   Antihyperglycemics (From admission, onward)      Start     Stop Route Frequency Ordered    10/26/23 1130  insulin aspart U-100 pen 4 Units         -- SubQ 3 times daily with meals 10/26/23 1016    10/25/23 2300  insulin aspart U-100 pen 0-5 Units         -- SubQ Every 4 hours PRN 10/25/23 9398            ASSESSMENT and PLAN    Pulmonary  * Acute on chronic respiratory " failure with hypoxia and hypercapnia  COVID dx on admission -- was on high dose steroids which was increasing insulin requirement, but last dose of dex was 10/21  Management per primary     Renal/  ESRD (end stage renal disease)  ESRD on HD  Cautious with insulin adjustments and avoid stacking  Likely reason for hypoglycemia     Endocrine  Type 2 diabetes mellitus with renal complication  Endocrinology consulted for BG management.   Inpatient BG goal 140-180    BG highly variable. Very labile/insulin sensitive. Will tolerate intermittent highs due to propensity for hypoglycemia.     - Hypoglycemia o/n after 2u levemir at bedtime and no aspart near the low. Thus, discontinued levemir with last dose 10/27 AM 2u. Will continue to monitor with scheduled and prn novolog without basal insulin. Ultimately, if he does require basal insulin at all, would dose once daily given in the morning in order to avoid overnight lows.  - Novolog (Insulin Aspart) 4 units tid before meals and low dose SSI prn (180/50) -- correct above 180 instead of 150 given hypoglycemia   - He may need low dose levemir added back but for now would monitor on LDC only given severe hypoglycemia earlier this admission   - BG checks AC/HS/0200 or q4h if not eating meals   - Hypoglycemia protocol in place  - If blood glucose greater than 300, please ask patient not to eat food or drink anything other than water until correctional insulin has brought it back below 250  - Minimize between meal snacking if able    ** Please notify Endocrine for any change and/or advance in diet**  ** Please call Endocrine for any BG related issues **    Discharge Planning:   TBD. Please notify endocrinology prior to discharge.      Hypoglycemia  Suspect hypoglycemia was due to insulin doses in ESRD pt with reduced clearance  Had another hypoglycemic episode very early AM 10/27 after only receiving 2u levemir at bedtime and no aspart after dinnertime. Will hold levemir for  now and monitor BG. If levemir is needed will plan to dose in morning only and not at bedtime.  Do not suspect pathologic endogenous insulin production in this pt at this time - would hold on off on hypoglycemia w/u unless persistent hypoglycemia without any insulin           Isabel Middleton MD  Endocrinology  [facility] - Intensive Care ([place]-)

## 2023-10-28 NOTE — RESPIRATORY THERAPY
"RAPID RESPONSE RESPIRATORY THERAPY PROACTIVE ROUNDING NOTE             Time of visit: 930     Code Status: Full Code   : 1965  Bed: 03658/43980 A:   MRN: [id number]  Time spent at the bedside: < 15 min    SITUATION    Evaluated patient for: HFNC Compliance     BACKGROUND    Patient has a past medical history of CHF (congestive heart failure), Chronic kidney disease-mineral and bone disorder, Chronic respiratory failure, COPD (chronic obstructive pulmonary disease), Diabetes mellitus type 1, ESRD on dialysis, Hypertension, Hypervolemia, Hypoglycemia, Hyponatremia, Hyponatremia, Other insomnia, Recurrent major depression, SOB (shortness of breath), and Unspecified lack of coordination.    24 Hours Vitals Range:  Temp:  [97.9 °F (36.6 °C)-98.8 °F (37.1 °C)]   Pulse:  [69-80]   Resp:  [16-33]   BP: (122-191)/(60-86)   SpO2:  [87 %-100 %]     Labs:    Recent Labs     10/26/23  0815 10/27/23  0507 10/28/23  0550    132* 135*   K 3.9 4.1 4.4    98 99   CO2 22* 23 22*   BUN 30* 37* 26*   CREATININE 2.8* 3.2* 2.7*   * 131* 168*   PHOS 3.4 4.1 3.6   MG 2.0 2.0 2.0        No results for input(s): "PH", "PCO2", "PO2", "HCO3", "POCSATURATED", "BE" in the last 72 hours.    ASSESSMENT/INTERVENTIONS    Pt resting comfortably with no respiratory needs    Last VS   Temp: 97.9 °F (36.6 °C) (10/28 0400)  Pulse: 69 (10/28 0413)  Resp: 33 (10/28 0413)  BP: 150/80 (10/28 0400)  SpO2: 100 % (10/28 0413)    Level of Consciousness: Level of Consciousness (AVPU): alert  Respiratory Effort: Respiratory Effort: Unlabored Expansion/Accessory Muscle Usage: Expansion/Accessory Muscles/Retractions: no use of accessory muscles, no retractions, expansion symmetric  All Lung Field Breath Sounds: All Lung Fields Breath Sounds: Anterior:, Lateral:, diminished  O2 Device/Concentration: H.F. [place] 35/50% Airvo   Was the O2 device able to be weaned? Yes  Ambu at bedside:      Active Orders   Respiratory Care    Bipap QHS     " Frequency: QHS     Number of Occurrences: Until Specified     Order Questions:      Mode Bilevel      FiO2% 65      Inspiratory pressure: 18      Expiratory pressure: 8    Oxygen Continuous     Frequency: Continuous     Number of Occurrences: Until Specified     Order Questions:      Device type: Low flow      Device: Nasal Cannula (1- 5 Liters)      LPM: 3      Titrate O2 per Oxygen Titration Protocol: Yes      To maintain SpO2 goal of: >= 90%      Notify MD of: Inability to achieve desired SpO2; Sudden change in patient status and requires 20% increase in FiO2; Patient requires >60% FiO2    Pulse Oximetry Continuous     Frequency: Continuous     Number of Occurrences: Until Specified       RECOMMENDATIONS    We recommend: RRT Recs: Pt weaned to regular N.C.      FOLLOW-UP    Please call back the Rapid Response RT, Pito Mejias RRT at x [phone] for any questions or concerns.

## 2023-10-28 NOTE — ASSESSMENT & PLAN NOTE
Recent Labs     10/27/23  1709 10/27/23  2015 10/28/23  0050 10/28/23  0456 10/28/23  0810 10/28/23  1220   POCTGLUCOSE 192* 220* 200* 196* 148* 262*     On low dose SS  Hypoglycemia protocol

## 2023-10-29 NOTE — ASSESSMENT & PLAN NOTE
S/p IV d 20 drip in ICU  Recent Labs     10/28/23  1607 10/28/23  1946 10/28/23  2342 10/29/23  0355 10/29/23  0847 10/29/23  1225   POCTGLUCOSE 268* 316* 291* 135* 182* 111*     -Currently on sliding scale insulin and insulin aspart with meals     Endocrinology is adjusting patient's dose.   - intermittent hypoglycemia- endocrine adjusting regimen   - long acting insulin stopped and BG elevated, increased prandial insulin per endo

## 2023-10-29 NOTE — SUBJECTIVE & OBJECTIVE
Interval History: No hypoglycemia overnight, long acting insulin was stopped. BG elevated increasing prandial. Endocrine following and adjusting regimen.     Stable on 4L NC   Out of restraints and stable >24 hours. Delirium precautions in place.       Review of Systems   Constitutional:  Negative for fever.   Respiratory:  Positive for shortness of breath. Negative for cough.    Gastrointestinal:  Negative for vomiting.     Objective:     Vital Signs (Most Recent):  Temp: 96.6 °F (35.9 °C) (10/29/23 1225)  Pulse: 75 (10/29/23 1225)  Resp: (!) 22 (10/29/23 1225)  BP: (!) 159/85 (10/29/23 1225)  SpO2: (!) 91 % (10/29/23 1225) Vital Signs (24h Range):  Temp:  [96.6 °F (35.9 °C)-98.2 °F (36.8 °C)] 96.6 °F (35.9 °C)  Pulse:  [69-81] 75  Resp:  [18-29] 22  SpO2:  [90 %-99 %] 91 %  BP: (137-165)/(77-86) 159/85     Weight: 65.5 kg (144 lb 6.4 oz)  Body mass index is 21.32 kg/m².    Intake/Output Summary (Last 24 hours) at 10/29/2023 1340  Last data filed at 10/28/2023 2346  Gross per 24 hour   Intake 200 ml   Output --   Net 200 ml           Physical Exam  Constitutional:       General: He is not in acute distress.     Appearance: Normal appearance.   HENT:      Head: Normocephalic and atraumatic.      Nose: No congestion or rhinorrhea.   Eyes:      General: No scleral icterus.     Extraocular Movements: Extraocular movements intact.   Cardiovascular:      Rate and Rhythm: Normal rate and regular rhythm.      Pulses: Normal pulses.      Heart sounds: Normal heart sounds.   Pulmonary:      Effort: Pulmonary effort is normal.      Breath sounds: No wheezing.      Comments: Regular NC in place  Abdominal:      General: Abdomen is flat. There is no distension.      Palpations: Abdomen is soft.      Tenderness: There is no abdominal tenderness.   Genitourinary:     Penis: Normal.    Musculoskeletal:         General: No swelling.      Cervical back: Normal range of motion.   Skin:     General: Skin is warm.   Neurological:       General: No focal deficit present.      Mental Status: He is alert and oriented to person, place, and time.      Comments: Oriented to self, place and year   Psychiatric:      Comments: Out of restraints             Significant Labs: All pertinent labs within the past 24 hours have been reviewed.    Significant Imaging: I have reviewed all pertinent imaging results/findings within the past 24 hours.

## 2023-10-29 NOTE — ASSESSMENT & PLAN NOTE
Patient with Hypercapnic and Hypoxic Respiratory failure which is Chronic.  he is not on home oxygen. Supplemental oxygen was provided and noted-  Signs/symptoms of respiratory failure include- tachypnea, increased work of breathing and use of accessory muscles. Contributing diagnoses includes - CHF, COPD and fluid volume excess Labs and images were reviewed. Patient Has recent ABG, which has been reviewed. Will treat underlying causes and adjust management of respiratory failure as follows-      58 year old male with multiple medical problems who presented to [facility] ED via EMS for AMS, hypoglycemia, and hypoxia placed on NIPPV. Per chart review patient with room air saturation in 50s which improved with HFNC, however, he remained tachypnic with accessory muscle use, therefore he was placed on NIPPV. Unclear if patient requires oxygen at baseline. Previous hospitalizations with oxygen use. CXR with bilateral opacifications R > L and BNP elevated > 4900.      Admitted to ICU, Intubated and mechanically ventilated, extubated to NC 10/13, sating well  Chest x-ray concerning for pneumonia vs ARDS     - Treated for COVID-19 pneumonia: Remdesivir completed. Dexamethasone 6 mg daily completed  - CAP coverage with Vanc/Zosyn completed   - Received HDper renal recs  - Duoneb nebulizer, tiotropium and Fluticasone furoate / Vilanterol   - supplemental O2, wean as tolerated. Currently on regular NC

## 2023-10-29 NOTE — ASSESSMENT & PLAN NOTE
Recent Labs     10/28/23  1607 10/28/23  1946 10/28/23  2342 10/29/23  0355 10/29/23  0847 10/29/23  1225   POCTGLUCOSE 268* 316* 291* 135* 182* 111*     On low dose SS  Hypoglycemia protocol

## 2023-10-29 NOTE — ASSESSMENT & PLAN NOTE
Endocrinology consulted for BG management.   Inpatient BG goal 140-180    BG highly variable. Very labile/insulin sensitive. Will tolerate intermittent highs due to propensity for hypoglycemia.  Increasing scheduled prandial insulin from Novolog 4u ac --> 5u ac      - Hypoglycemia o/n after 2u levemir at bedtime and no aspart near the low. Thus, discontinued levemir with last dose 10/27 AM 2u. Will continue to monitor with scheduled and prn novolog without basal insulin. Ultimately, if he does require basal insulin at all, would dose once daily given in the morning in order to avoid overnight lows.  - Novolog (Insulin Aspart) 5 units tid before meals and low dose SSI prn (180/50) -- correct above 180 instead of 150 given hypoglycemia   - He may need low dose levemir added back but for now would monitor on LDC only given severe hypoglycemia earlier this admission   - BG checks AC/HS/0200 or q4h if not eating meals   - Hypoglycemia protocol in place  - Minimize between meal snacking if able    ** Please notify Endocrine for any change and/or advance in diet**  ** Please call Endocrine for any BG related issues **    Discharge Planning:   TBD. Please notify endocrinology prior to discharge.

## 2023-10-29 NOTE — ASSESSMENT & PLAN NOTE
Patient with Hypoxic Respiratory failure which is Acute.  he is not on home oxygen. Supplemental oxygen was provided and noted.  Signs/symptoms of respiratory failure include- respiratory distress. Contributing diagnoses includes - ARDS Labs and images were reviewed. Patient Has recent ABG, which has been reviewed. Will treat underlying causes and adjust management of respiratory failure as follows- continuing antibiotics and antivirals, adjustments of mechanical ventilation, HD and fluid regulation.      Extubated in ICU to 5L NC morning of 10/13. In no acute distress  VBG 7.2552  However mental status improving, now out of restraints >24 hours  -Currently on regular NC, wean as tolerated

## 2023-10-29 NOTE — SUBJECTIVE & OBJECTIVE
"Interval HPI:   Overnight events: BG 130s-316. Not on basal insulin. Will increase prandial by 1u today.  Eatin%  Nausea: No  Hypoglycemia and intervention: No  Fever: No  TPN and/or TF: No  If yes, type of TF/TPN and rate: n/a    BP (!) 165/82 (BP Location: Right arm, Patient Position: Lying)   Pulse 72   Temp 97.8 °F (36.6 °C) (Oral)   Resp 18   Ht 5' 9" (1.753 m)   Wt 65.5 kg (144 lb 6.4 oz)   SpO2 (!) 93%   BMI 21.32 kg/m²     Labs Reviewed and Include    Recent Labs   Lab 10/29/23  0518   *   CALCIUM 7.4*   ALBUMIN 2.1*   PROT 6.1   *   K 4.7   CO2 22*   CL 99   BUN 35*   CREATININE 3.6*   ALKPHOS 202*   ALT 26   AST 28   BILITOT 0.7     Lab Results   Component Value Date    WBC 3.59 (L) 10/29/2023    HGB 8.4 (L) 10/29/2023    HCT 26.3 (L) 10/29/2023    MCV 93 10/29/2023     10/29/2023     No results for input(s): "TSH", "FREET4" in the last 168 hours.  Lab Results   Component Value Date    HGBA1C 8.4 (H) 10/12/2023       Nutritional status:   Body mass index is 21.32 kg/m².  Lab Results   Component Value Date    ALBUMIN 2.1 (L) 10/29/2023    ALBUMIN 2.1 (L) 10/28/2023    ALBUMIN 2.0 (L) 10/27/2023     No results found for: "PREALBUMIN"    Estimated Creatinine Clearance: 20.2 mL/min (A) (based on SCr of 3.6 mg/dL (H)).    Accu-Checks  Recent Labs     10/27/23  2015 10/28/23  0050 10/28/23  0456 10/28/23  0810 10/28/23  1220 10/28/23  1607 10/28/23  1946 10/28/23  2342 10/29/23  0355 10/29/23  0847   POCTGLUCOSE 220* 200* 196* 148* 262* 268* 316* 291* 135* 182*       Current Medications and/or Treatments Impacting Glycemic Control  Immunotherapy:    Immunosuppressants       None          Steroids:   Hormones (From admission, onward)      Start     Stop Route Frequency Ordered    10/23/23 0118  melatonin tablet 9 mg         -- Oral Nightly PRN 10/23/23 0018          Pressors:    Autonomic Drugs (From admission, onward)      None          Hyperglycemia/Diabetes Medications: "   Antihyperglycemics (From admission, onward)      Start     Stop Route Frequency Ordered    10/29/23 1145  insulin aspart U-100 pen 5 Units         -- SubQ 3 times daily with meals 10/29/23 1134    10/25/23 2300  insulin aspart U-100 pen 0-5 Units         -- SubQ Every 4 hours PRN 10/25/23 3762

## 2023-10-29 NOTE — PROGRESS NOTES
"[facility] - Intensive Care ([facility]-)  Endocrinology  Progress Note    Admit Date: 10/11/2023     Cosme Lewis is a 59yo M with T2DM (on insulin at home), ESRD on HD, COPD, HFrEF, and HTN from [facility] who was brought in to the hospital on 10/11/2023 for evaluation of shortness of breath. Patient was diagnosed and treated for COVID pneumonia and sepsis. On admission patient was hypoglycemic felt to be secondary to administration of insulin at the nursing home. Hospitalization has been further complicated by acute encephalopathy.     Since admission patient has been treated with various steroids, levemir and novolog with some hypoglycemia. He received 10 days of high dose dexamethasone for covid. Last dose of dexamethasone was on 10/21. At time of endocrinology consult pt was getting Novolog moderate dose correction scale. Levemir was discontinued several days ago 2/2 hypoglycemia. Endocrinology is consulted for "Labile BG, request recs for new home regimen"    Since discontinuing levemir BG has been >70, although this AM 70s-80s. Getting a significant amount of SSI due to moderate dose correction being ordered instead of low dose in this pt with ESRD.             Regarding T2DM  LOV with NP Snow Calle 2023  Diagnosed with DM around 4 years ago     Outpatient insulin regimen:  Levemir 8 units twice daily   Humalog 8 units plus correction scale before meals   Add correction scale as needed.  Blood sugar 201 to 250 add 2 units  Blood sugar 251 to 300 add 4 units  Blood sugar 301 to 350 add 6 units  Blood sugar greater than 350 add 8 units         Interval HPI:   Overnight events: BG 130s-316. Not on basal insulin. Will increase prandial by 1u today.  Eatin%  Nausea: No  Hypoglycemia and intervention: No  Fever: No  TPN and/or TF: No  If yes, type of TF/TPN and rate: n/a    BP (!) 165/82 (BP Location: Right arm, Patient Position: Lying)   Pulse 72   Temp 97.8 °F (36.6 °C) (Oral)   Resp " "18   Ht 5' 9" (1.753 m)   Wt 65.5 kg (144 lb 6.4 oz)   SpO2 (!) 93%   BMI 21.32 kg/m²     Labs Reviewed and Include    Recent Labs   Lab 10/29/23  0518   *   CALCIUM 7.4*   ALBUMIN 2.1*   PROT 6.1   *   K 4.7   CO2 22*   CL 99   BUN 35*   CREATININE 3.6*   ALKPHOS 202*   ALT 26   AST 28   BILITOT 0.7     Lab Results   Component Value Date    WBC 3.59 (L) 10/29/2023    HGB 8.4 (L) 10/29/2023    HCT 26.3 (L) 10/29/2023    MCV 93 10/29/2023     10/29/2023     No results for input(s): "TSH", "FREET4" in the last 168 hours.  Lab Results   Component Value Date    HGBA1C 8.4 (H) 10/12/2023       Nutritional status:   Body mass index is 21.32 kg/m².  Lab Results   Component Value Date    ALBUMIN 2.1 (L) 10/29/2023    ALBUMIN 2.1 (L) 10/28/2023    ALBUMIN 2.0 (L) 10/27/2023     No results found for: "PREALBUMIN"    Estimated Creatinine Clearance: 20.2 mL/min (A) (based on SCr of 3.6 mg/dL (H)).    Accu-Checks  Recent Labs     10/27/23  2015 10/28/23  0050 10/28/23  0456 10/28/23  0810 10/28/23  1220 10/28/23  1607 10/28/23  1946 10/28/23  2342 10/29/23  0355 10/29/23  0847   POCTGLUCOSE 220* 200* 196* 148* 262* 268* 316* 291* 135* 182*       Current Medications and/or Treatments Impacting Glycemic Control  Immunotherapy:    Immunosuppressants       None          Steroids:   Hormones (From admission, onward)      Start     Stop Route Frequency Ordered    10/23/23 0118  melatonin tablet 9 mg         -- Oral Nightly PRN 10/23/23 0018          Pressors:    Autonomic Drugs (From admission, onward)      None          Hyperglycemia/Diabetes Medications:   Antihyperglycemics (From admission, onward)      Start     Stop Route Frequency Ordered    10/29/23 1145  insulin aspart U-100 pen 5 Units         -- SubQ 3 times daily with meals 10/29/23 1134    10/25/23 2300  insulin aspart U-100 pen 0-5 Units         -- SubQ Every 4 hours PRN 10/25/23 2159            ASSESSMENT and PLAN    Pulmonary  * Acute on chronic " respiratory failure with hypoxia and hypercapnia  COVID dx on admission -- was on high dose steroids which was increasing insulin requirement, but last dose of dex was 10/21  Management per primary     Renal/  ESRD (end stage renal disease)  ESRD on HD  Cautious with insulin adjustments and avoid stacking  Likely reason for hypoglycemia     Endocrine  Type 2 diabetes mellitus with renal complication  Endocrinology consulted for BG management.   Inpatient BG goal 140-180    BG highly variable. Very labile/insulin sensitive. Will tolerate intermittent highs due to propensity for hypoglycemia.  Increasing scheduled prandial insulin from Novolog 4u ac --> 5u ac      - Hypoglycemia o/n after 2u levemir at bedtime and no aspart near the low. Thus, discontinued levemir with last dose 10/27 AM 2u. Will continue to monitor with scheduled and prn novolog without basal insulin. Ultimately, if he does require basal insulin at all, would dose once daily given in the morning in order to avoid overnight lows.  - Novolog (Insulin Aspart) 5 units tid before meals and low dose SSI prn (180/50) -- correct above 180 instead of 150 given hypoglycemia   - He may need low dose levemir added back but for now would monitor on LDC only given severe hypoglycemia earlier this admission   - BG checks AC/HS/0200 or q4h if not eating meals   - Hypoglycemia protocol in place  - Minimize between meal snacking if able    ** Please notify Endocrine for any change and/or advance in diet**  ** Please call Endocrine for any BG related issues **    Discharge Planning:   TBD. Please notify endocrinology prior to discharge.      Hypoglycemia  Suspect hypoglycemia was due to insulin doses in ESRD pt with reduced clearance  Had another hypoglycemic episode very early AM 10/27 after only receiving 2u levemir at bedtime and no aspart after dinnertime. Will hold levemir for now and monitor BG. If levemir is needed will plan to dose in morning only and not at  bedtime.  Do not suspect pathologic endogenous insulin production in this pt at this time - would hold on off on hypoglycemia w/u unless persistent hypoglycemia without any insulin           Isabel Middleton MD  Endocrinology  [facility] - Intensive Care ([place])

## 2023-10-29 NOTE — PROGRESS NOTES
[facility] - Intensive Care ([address] Medicine  Progress Note    Patient Name: Cosme Lewis  MRN: [id number]  Patient Class: IP- Inpatient   Admission Date: 10/11/2023  Length of Stay: 18 days  Attending Physician: Cecy Sierra MD  Primary Care Provider: Eliecer Ohara III, MD        Subjective:     Principal Problem:Acute on chronic respiratory failure with hypoxia and hypercapnia        HPI:  Patient is very pleasant, was getting dialysis.  Denies any complaints.  Reports that his shortness of breath is better than it has ever been.  Denies any chest pain or abdominal pain, and reports the only thing that is bothersome is his Sob. He reiterated that it is better than it is ever been      Overview/Hospital Course:  58 year old male with PMH notable for COPD, HFrEF (45% and DD), ESRD on dialysis - MWF, HTN, DM presents via EMS from nursing home for altered mental status and hypoxia.  Per EMS, patient was found to have glucose of 38, he received D50 with improvement in his glucose.  He also had SpO2 58% which improved with 15 L nasal cannula.   ED work up revealed VBG 7.27/61 and he was subsequently placed on bipap for work of breathing. He was persistently hypoglycemic with BG 59 --> 34 despite IV dextrose, therefore he was started on a continuous dextrose infusion. Other lab work up revealed WBC 3, stable, H//H 9.5/29, Na 143, CO2 20, Cr/BUN 3.8/38, elevated AST/ALT/alk phos, and troponin 0.174 with no ischemic changes on EKG. He was given IV rocephin and doxycyline. Chest XR with bilateral opacifications R > L.  Critical care consulted for acute hypoxemic respiratory failure and NIPPV. Found to have COVID pneumonia with overlapping community acquired pneumonia.   ICU Course:  In the MICU, the patient arrived on bipap but was found to still be hypoxic which initially improved after adjusting his bipap settings. He continued to be altered and tried occasionally became combative requiring precedex which  was affective when maxed out at 1.4 mcg/kg/hour. During this time he was also found to have worsening hypoglycemia which required continuous D10 administration at a max rate of 200 cc/hour. Eventually his oxygen status started declining and the decision was made to intubate the patient. It is believed that worsening of his oxygen status was in part due the large volume of D10 he received to maintain his blood sugars. For this reason, it was decided that D20 at a slower rate would be more appropriate, and for this a central line was placed. Additionally, he received a round of hemodialysis. He is receiving remdesivir for COVID and vacomycin/zosyn/doxycycline for community acquired pneumonia coverage/concerning chest x-ray. The patient's sugars started rising on the D20 and eventually he started becoming hyperglycemic  to the 220s which led to the D20 being stopped for the time being and since that time the sugars have been relatively normoglycemic. His abdomen was found to be slightly more distended but KUB was negative for bowel obstruction, likely suggesting ascites as the source.  On 10/12 after passing SAT and SBT, the decision was made to extubate the patient which was done successfully. All sedatives were turned off at this time as well. He was placed on high flow at the time and has been sating well. He continued to receive antibiotics as well as antivirals for CAP coverage/COVID respectively which was able to help improve his likely pneumonia vs ARDS. Between 10/14 - 10/17 he went back and forth between requiring high flow and bipap, however starting on 10/17 he was able to tolerate nasal canula.     Stepped down to hospital medicine. Having intermittent delirium overnight pulling at O2, requiring placement of restraints. Transitioned to HFNC given desaturations. Delirium precautions added.   Endocrine following and adjusting insulin given intermittent hypoglycemia resolved. Weaned to regular NC and out of  restraints.       Interval History: No hypoglycemia overnight, long acting insulin was stopped. BG elevated increasing prandial. Endocrine following and adjusting regimen.     Stable on 4L NC   Out of restraints and stable >24 hours. Delirium precautions in place.       Review of Systems   Constitutional:  Negative for fever.   Respiratory:  Positive for shortness of breath. Negative for cough.    Gastrointestinal:  Negative for vomiting.     Objective:     Vital Signs (Most Recent):  Temp: 96.6 °F (35.9 °C) (10/29/23 1225)  Pulse: 75 (10/29/23 1225)  Resp: (!) 22 (10/29/23 1225)  BP: (!) 159/85 (10/29/23 1225)  SpO2: (!) 91 % (10/29/23 1225) Vital Signs (24h Range):  Temp:  [96.6 °F (35.9 °C)-98.2 °F (36.8 °C)] 96.6 °F (35.9 °C)  Pulse:  [69-81] 75  Resp:  [18-29] 22  SpO2:  [90 %-99 %] 91 %  BP: (137-165)/(77-86) 159/85     Weight: 65.5 kg (144 lb 6.4 oz)  Body mass index is 21.32 kg/m².    Intake/Output Summary (Last 24 hours) at 10/29/2023 1340  Last data filed at 10/28/2023 2346  Gross per 24 hour   Intake 200 ml   Output --   Net 200 ml           Physical Exam  Constitutional:       General: He is not in acute distress.     Appearance: Normal appearance.   HENT:      Head: Normocephalic and atraumatic.      Nose: No congestion or rhinorrhea.   Eyes:      General: No scleral icterus.     Extraocular Movements: Extraocular movements intact.   Cardiovascular:      Rate and Rhythm: Normal rate and regular rhythm.      Pulses: Normal pulses.      Heart sounds: Normal heart sounds.   Pulmonary:      Effort: Pulmonary effort is normal.      Breath sounds: No wheezing.      Comments: Regular NC in place  Abdominal:      General: Abdomen is flat. There is no distension.      Palpations: Abdomen is soft.      Tenderness: There is no abdominal tenderness.   Genitourinary:     Penis: Normal.    Musculoskeletal:         General: No swelling.      Cervical back: Normal range of motion.   Skin:     General: Skin is warm.    Neurological:      General: No focal deficit present.      Mental Status: He is alert and oriented to person, place, and time.      Comments: Oriented to self, place and year   Psychiatric:      Comments: Out of restraints             Significant Labs: All pertinent labs within the past 24 hours have been reviewed.    Significant Imaging: I have reviewed all pertinent imaging results/findings within the past 24 hours.      Assessment/Plan:      * Acute on chronic respiratory failure with hypoxia and hypercapnia  Patient with Hypercapnic and Hypoxic Respiratory failure which is Chronic.  he is not on home oxygen. Supplemental oxygen was provided and noted-  Signs/symptoms of respiratory failure include- tachypnea, increased work of breathing and use of accessory muscles. Contributing diagnoses includes - CHF, COPD and fluid volume excess Labs and images were reviewed. Patient Has recent ABG, which has been reviewed. Will treat underlying causes and adjust management of respiratory failure as follows-      58 year old male with multiple medical problems who presented to [facility] ED via EMS for AMS, hypoglycemia, and hypoxia placed on NIPPV. Per chart review patient with room air saturation in 50s which improved with HFNC, however, he remained tachypnic with accessory muscle use, therefore he was placed on NIPPV. Unclear if patient requires oxygen at baseline. Previous hospitalizations with oxygen use. CXR with bilateral opacifications R > L and BNP elevated > 4900.      Admitted to ICU, Intubated and mechanically ventilated, extubated to NC 10/13, sating well  Chest x-ray concerning for pneumonia vs ARDS     - Treated for COVID-19 pneumonia: Remdesivir completed. Dexamethasone 6 mg daily completed  - CAP coverage with Vanc/Zosyn completed   - Received HDper renal recs  - Duoneb nebulizer, tiotropium and Fluticasone furoate / Vilanterol   - supplemental O2, wean as tolerated. Currently on regular NC    Dyspnea  - see resp  failure  - cont supportive NC, wean as tolerated      Acute respiratory failure with hypoxia and hypercapnia  Patient with Hypoxic Respiratory failure which is Acute.  he is not on home oxygen. Supplemental oxygen was provided and noted.  Signs/symptoms of respiratory failure include- respiratory distress. Contributing diagnoses includes - ARDS Labs and images were reviewed. Patient Has recent ABG, which has been reviewed. Will treat underlying causes and adjust management of respiratory failure as follows- continuing antibiotics and antivirals, adjustments of mechanical ventilation, HD and fluid regulation.      Extubated in ICU to 5L NC morning of 10/13. In no acute distress  VBG 7.2552  However mental status improving, now out of restraints >24 hours  -Currently on regular NC, wean as tolerated        Type 2 diabetes mellitus with renal complication  Recent Labs     10/28/23  1607 10/28/23  1946 10/28/23  2342 10/29/23  0355 10/29/23  0847 10/29/23  1225   POCTGLUCOSE 268* 316* 291* 135* 182* 111*     On low dose SS  Hypoglycemia protocol      History of pulmonary embolism  Diagnosed 10/2022  Repeat CT 3/2023 with resolution     - cont home eliquis     Hypoglycemia  S/p IV d 20 drip in ICU  Recent Labs     10/28/23  1607 10/28/23  1946 10/28/23  2342 10/29/23  0355 10/29/23  0847 10/29/23  1225   POCTGLUCOSE 268* 316* 291* 135* 182* 111*     -Currently on sliding scale insulin and insulin aspart with meals     Endocrinology is adjusting patient's dose.   - intermittent hypoglycemia- endocrine adjusting regimen   - long acting insulin stopped and BG elevated, increased prandial insulin per endo      COPD (chronic obstructive pulmonary disease)  --Duo nebs Q4wake  --Currently on regular nasal canula  --Fluticasone furoate / Vilanterol and Salmeterol     HLD (hyperlipidemia)   LFT's have improved, cont atorvastatin 40 mg daily      Anemia in ESRD (end-stage renal disease)  - H/H stable  -- CBC daily and trend   --  Transfuse for hgb < 7   -- Receives EPO with iHD    Chronic diastolic heart failure    ECHO 09/23:        Left Ventricle: The left ventricle is normal in size. Increased wall thickness. There is concentric hypertrophy. Mild global hypokinesis present. There is mildly reduced systolic function with a visually estimated ejection fraction of 40 - 45%. There is indeterminate diastolic function.    Right Ventricle: Normal right ventricular cavity size. Right ventricle wall motion  is normal. Systolic function is borderline low.    Left Atrium: Left atrium is severely dilated.    Tricuspid Valve: There is mild regurgitation.    Pericardium: There is a trivial effusion. Echodensity seen adjacent to the visceral pericardium of uncertain significance. This may be pericardial fat although other etiologies not excluded. Correlate clinically.    PASP is at least 46 mmHg.     BNP > 4900     --Volume removal per iHD. Appreciate nephrology assistance   -- Cont home nifedipine 60 mg qd and lisinopril 40 mg qd    ESRD (end stage renal disease)  Nephrology following  Outpatient HD [facility]: [facility]   HD tx days: MWF   HD tx time: 4hrs   HD access: LUE AVF   HD modality: iHD   Residual urine: Anuric   Estimated Creatinine Clearance: 33.8 mL/min (A) (based on SCr of 2.3 mg/dL (H)).   Toelrating iHD well     -- Nephrology consulted for iHD management. Appreciate their assistance   -- CMP daily and trend   -- Avoid nephrotoxins   -- Renally dose all medications to appropriate GFR / CrCl   -- Hgb > 7 and MAP > 65 goals       VTE Risk Mitigation (From admission, onward)         Ordered     apixaban tablet 5 mg  2 times daily         10/26/23 9944     IP VTE HIGH RISK PATIENT  Once         10/11/23 0248     Place sequential compression device  Until discontinued         10/11/23 0248                Discharge Planning   GERA: 10/30/2023     Code Status: Full Code   Is the patient medically ready for discharge?: No    Reason for  patient still in hospital (select all that apply): Patient trending condition  Discharge Plan A: Return to nursing home   Discharge Delays: (!) Procedure Scheduling (IR, OR, Labs, Echo, Cath, Echo, EEG)              Cecy Sierra MD  Department of Hospital Medicine   [facility] - Intensive Care ([place])

## 2023-10-30 NOTE — NURSING
Pt AAO x 3, no c/o pain. Blood glucose 60 this am. Pt is asymptomatic and eating breakfast. Dr. Robles notified. New orders noted. Monitoring

## 2023-10-30 NOTE — PROGRESS NOTES
Patient laying bed.  VSS.  HD TX  1:1  started via UMA AV fistula using 15 g needles.  Isolation precautions maintained.

## 2023-10-30 NOTE — PROGRESS NOTES
"[facility] - Intensive Care ([facility]  Endocrinology  Progress Note    Admit Date: 10/11/2023     Cosme Lewis is a 57yo M with T2DM (on insulin at home), ESRD on HD, COPD, HFrEF, and HTN from [facility] who was brought in to the hospital on 10/11/2023 for evaluation of shortness of breath. Patient was diagnosed and treated for COVID pneumonia and sepsis. On admission patient was hypoglycemic felt to be secondary to administration of insulin at the nursing home. Hospitalization has been further complicated by acute encephalopathy.     Since admission patient has been treated with various steroids, levemir and novolog with some hypoglycemia. He received 10 days of high dose dexamethasone for covid. Last dose of dexamethasone was on 10/21. At time of endocrinology consult pt was getting Novolog moderate dose correction scale. Levemir was discontinued several days ago 2/2 hypoglycemia. Endocrinology is consulted for "Labile BG, request recs for new home regimen"    Since discontinuing levemir BG has been >70, although this AM 70s-80s. Getting a significant amount of SSI due to moderate dose correction being ordered instead of low dose in this pt with ESRD.         Regarding T2DM  LOV with NP Snow Calle 2023  Diagnosed with DM around 4 years ago     Outpatient insulin regimen:  Levemir 8 units twice daily   Humalog 8 units plus correction scale before meals   Add correction scale as needed.  Blood sugar 201 to 250 add 2 units  Blood sugar 251 to 300 add 4 units  Blood sugar 301 to 350 add 6 units  Blood sugar greater than 350 add 8 units         Interval HPI:   Overnight events:  BG with down trend overnight despite no basal insulin.  Variable BG and labile at times still.  Plan for DC today likely.      Eatin%  Nausea: No  Hypoglycemia and intervention: BG down to 60, asymptomatic  Fever: No  TPN and/or TF: No  If yes, type of TF/TPN and rate: NA    BP (!) 141/66 (BP Location: Right arm, " "Patient Position: Lying)   Pulse 72   Temp 97.9 °F (36.6 °C) (Oral)   Resp 17   Ht 5' 9" (1.753 m)   Wt 65 kg (143 lb 4.8 oz)   SpO2 (!) 93%   BMI 21.16 kg/m²     Labs Reviewed and Include    Recent Labs   Lab 10/30/23  0458   GLU 66*   CALCIUM 7.5*   ALBUMIN 2.2*   PROT 6.0   *   K 5.2*   CO2 23   CL 98   BUN 49*   CREATININE 4.3*   ALKPHOS 191*   ALT 25   AST 25   BILITOT 0.7     Lab Results   Component Value Date    WBC 3.48 (L) 10/30/2023    HGB 8.0 (L) 10/30/2023    HCT 25.5 (L) 10/30/2023    MCV 93 10/30/2023     10/30/2023     No results for input(s): "TSH", "FREET4" in the last 168 hours.  Lab Results   Component Value Date    HGBA1C 8.4 (H) 10/12/2023       Nutritional status:   Body mass index is 21.16 kg/m².  Lab Results   Component Value Date    ALBUMIN 2.2 (L) 10/30/2023    ALBUMIN 2.1 (L) 10/29/2023    ALBUMIN 2.1 (L) 10/28/2023     No results found for: "PREALBUMIN"    Estimated Creatinine Clearance: 16.8 mL/min (A) (based on SCr of 4.3 mg/dL (H)).    Accu-Checks  Recent Labs     10/28/23  0810 10/28/23  1220 10/28/23  1607 10/28/23  1946 10/28/23  2342 10/29/23  0355 10/29/23  0847 10/29/23  1225 10/29/23  1642 10/30/23  0816   POCTGLUCOSE 148* 262* 268* 316* 291* 135* 182* 111* 169* 60*       Current Medications and/or Treatments Impacting Glycemic Control  Immunotherapy:    Immunosuppressants       None          Steroids:   Hormones (From admission, onward)      Start     Stop Route Frequency Ordered    10/23/23 0118  melatonin tablet 9 mg         -- Oral Nightly PRN 10/23/23 0018          Pressors:    Autonomic Drugs (From admission, onward)      None          Hyperglycemia/Diabetes Medications:   Antihyperglycemics (From admission, onward)      Start     Stop Route Frequency Ordered    10/30/23 1130  insulin aspart U-100 pen 4 Units         -- SubQ 3 times daily with meals 10/30/23 0840    10/29/23 2045  insulin aspart U-100 pen 0-5 Units         -- SubQ Before meals & nightly " PRN 10/29/23 1941            ASSESSMENT and PLAN    Pulmonary  * Acute on chronic respiratory failure with hypoxia and hypercapnia  COVID dx on admission -- was on high dose steroids which was increasing insulin requirement, but last dose of dex was 10/21  Management per primary     Renal/  ESRD (end stage renal disease)  ESRD on HD  Cautious with insulin adjustments and avoid stacking  Likely reason for hypoglycemia     Endocrine  Type 2 diabetes mellitus with renal complication  Endocrinology consulted for BG management.   Inpatient BG goal 140-180 (low again this morning into the 60's)    BG highly variable. Very labile/insulin sensitive. Will tolerate intermittent highs due to propensity for hypoglycemia.  Decreased scheduled prandial insulin from Novolog 5u ac --> 4u ac      - Hypoglycemia o/n after 2u levemir at bedtime and no aspart near the low. Thus, discontinued levemir with last dose 10/27 AM 2u. Will continue to monitor with scheduled and prn novolog without basal insulin. Ultimately, if he does require basal insulin at all, would dose once daily given in the morning in order to avoid overnight lows.  - Novolog (Insulin Aspart) 5 units tid before meals and low dose SSI prn (180/50) -- correct above 180 instead of 150 given hypoglycemia   - He may need low dose levemir added back but for now would monitor on LDC only given severe hypoglycemia earlier this admission   - BG checks AC/HS/0200 or q4h if not eating meals   - Hypoglycemia protocol in place  - Minimize between meal snacking if able    ** Please notify Endocrine for any change and/or advance in diet**  ** Please call Endocrine for any BG related issues **    Discharge Recommendations:   Discontinue home Levemir at discharge  Continue Humalog 5 units TIDAC plus low dose scale (180/50)  (higher prandial dose at home compared to hospital as likely eating higher amount of food at home)    Low dose: Novolog/Humalog correction based on before meal  glucose:  180-230: add 1 unit   231-280: add 2 units   281-330: add 3 units   331-380: add 4 units   >380: add 5 units    Has follow up in the office with Snow Calle NP on 11/16/2023.     Hypoglycemia  Suspect hypoglycemia was due to insulin doses in ESRD pt with reduced clearance  Had another hypoglycemic episode very early AM 10/27 after only receiving 2u levemir at bedtime and no aspart after dinnertime. Had levemir discontinued.  If levemir is needed in the future will plan to dose in morning only and not at bedtime.  Do not suspect pathologic endogenous insulin production in this pt at this time - would hold on off on hypoglycemia w/u unless persistent hypoglycemia without any insulin on board. BG into the 60's this morning.  Prior meal intake recorded as 100%.            Cole Robles, DO  Endocrinology

## 2023-10-30 NOTE — NURSING
Pt AAO x 3, not oriented to time. No c/o pain. Bedside dialysis in progress. Report called to Gunner at [facility]. Pt is returning to room 209B. Notified accepting facility that the pt will be leaving following dialysis. Nurse verbalized understanding of report. Call back number given for any additional questions.

## 2023-10-30 NOTE — PLAN OF CARE
10/30/23 1336   Post-Acute Status   Post-Acute Authorization Placement   Post-Acute Placement Status Set-up Complete/Auth obtained   Coverage MEDICARE - MEDICARE PART A & B   Hospital Resources/Appts/Education Provided Provided education on problems/symptoms using teachback   Discharge Delays None known at this time   Discharge Plan   Discharge Plan A Return to nursing home  ([facility] [employer] Phone: [phone])   Discharge Plan B Return to Nursing Home     CM sent NH orders to [facility] via Care [facility].    CM spoke with Farida @ [facility] and updated on sent NH orders and that patient is with COVID and tested positive on day of admission 10/11/23.  [facility] will be able to accommodate the patient.    Will update assigned nurse with room number and number to call report along with transportation set up.     2:45 pm  Patient is assigned to room 209 B, assigned nurse provided facility  number to call report and transportation scheduled for 3:45 pm .    3:56 pm  CM spoke with assigned nurse and patient is presently in dialysis and will need to push back transportation  time.    3:58 PM   CM spoke with Mahad QUACH at [facility] # [id number] and will push transportation time to 6: 15 pm    Blanca Brown RN  Case Management  [facility]  [phone]     Visual

## 2023-10-30 NOTE — PROGRESS NOTES
HD TX completed.  Net fluid removed is 2.3 L.  VSS.  Tolerated dialysis.  Needles removed from exit sites.   Pressure applied until hemostasis achieved.  Report given to primary nurse.

## 2023-10-30 NOTE — NURSING
Pt AAO x 3, no c/o pain. VSS, oxygen saturation 94% on 5L NC. Dialysis completed without difficulty. Gauze dressing to left upper arm fistula. Pt ate 100% of all meals throughout the day. Pt up to the chair x 1 with x 2 person assist. Pt tolerated activity well. Triad cream applied to buttocks x 3. Attempted to call receiving nurse at [facility] to give an update on the pt. Receiving nurse unavailable per the  at [facility]. Transport at the bedside for patient. Pt left with all belongings on a stretcher to return to [facility].

## 2023-10-30 NOTE — PLAN OF CARE
10/30/23 0801   Discharge Reassessment   Assessment Type Discharge Planning Reassessment   Did the patient's condition or plan change since previous assessment? No   Discharge Plan discussed with: Parent(s)   Name(s) and Number(s) Kassandra Leon (Mother)   [phone] (Mobile)   Communicated GERA with patient/caregiver Yes   Discharge Plan A Return to nursing home  ([facility]  [phone])   Discharge Plan B Return to Nursing Home   DME Needed Upon Discharge  none   Transition of Care Barriers None   Why the patient remains in the hospital Requires continued medical care   Post-Acute Status   Post-Acute Authorization Placement   Post-Acute Placement Status Set-up Complete/Auth obtained   Coverage MEDICARE - MEDICARE PART A & B   Hospital Resources/Appts/Education Provided Provided education on problems/symptoms using teachback   Discharge Delays (!) Procedure Scheduling (IR, OR, Labs, Echo, Cath, Echo, EEG)     CM met with patient/family to discuss any changes in discharge planning.  Patients plan is to  [facility], patient is [facility].   No changes in DC plans. GERA: 10/30/23    COVID pneumonia with overlapping community acquired pneumonia.  Patient is out of restraints.     CM sent updates to [facility]  via [employer].       Blanca Brown RN  Case Management  [facility]  [phone]

## 2023-10-30 NOTE — ASSESSMENT & PLAN NOTE
Endocrinology consulted for BG management.   Inpatient BG goal 140-180 (low again this morning into the 60's)    BG highly variable. Very labile/insulin sensitive. Will tolerate intermittent highs due to propensity for hypoglycemia.  Decreased scheduled prandial insulin from Novolog 5u ac --> 4u ac      - Hypoglycemia o/n after 2u levemir at bedtime and no aspart near the low. Thus, discontinued levemir with last dose 10/27 AM 2u. Will continue to monitor with scheduled and prn novolog without basal insulin. Ultimately, if he does require basal insulin at all, would dose once daily given in the morning in order to avoid overnight lows.  - Novolog (Insulin Aspart) 5 units tid before meals and low dose SSI prn (180/50) -- correct above 180 instead of 150 given hypoglycemia   - He may need low dose levemir added back but for now would monitor on LDC only given severe hypoglycemia earlier this admission   - BG checks AC/HS/0200 or q4h if not eating meals   - Hypoglycemia protocol in place  - Minimize between meal snacking if able    ** Please notify Endocrine for any change and/or advance in diet**  ** Please call Endocrine for any BG related issues **    Discharge Recommendations:   Discontinue home Levemir at discharge  Continue Humalog 5 units TIDAC plus low dose scale (180/50)  (higher prandial dose at home compared to hospital as likely eating higher amount of food at home)    Low dose: Novolog/Humalog correction based on before meal glucose:  180-230: add 1 unit   231-280: add 2 units   281-330: add 3 units   331-380: add 4 units   >380: add 5 units    Has follow up in the office with Snow Calle NP on 11/16/2023.

## 2023-10-30 NOTE — ASSESSMENT & PLAN NOTE
Suspect hypoglycemia was due to insulin doses in ESRD pt with reduced clearance  Had another hypoglycemic episode very early AM 10/27 after only receiving 2u levemir at bedtime and no aspart after dinnertime. Had levemir discontinued.  If levemir is needed in the future will plan to dose in morning only and not at bedtime.  Do not suspect pathologic endogenous insulin production in this pt at this time - would hold on off on hypoglycemia w/u unless persistent hypoglycemia without any insulin on board. BG into the 60's this morning.  Prior meal intake recorded as 100%.

## 2023-10-30 NOTE — PROGRESS NOTES
Arrived patient's room for bedside HD TX.  Patient sitting in the recliner.  Awake, alert and responsive.  Awaiting for the primary nurse.to transfer  patient in bed

## 2023-10-30 NOTE — PLAN OF CARE
NURSING HOME ORDERS    10/30/2023  [facility]  [facility] - INTENSIVE CARE ([address] [place])  [address]  [facility] [address]  Dept: [phone]  Loc: [phone]     Admit to Nursing Home:  [facility] senior living     Diagnoses:  Active Hospital Problems    Diagnosis  POA    *Acute on chronic respiratory failure with hypoxia and hypercapnia [J96.21, J96.22]  Yes    Dyspnea [R06.00]  Yes    Acute respiratory failure with hypoxia and hypercapnia [J96.01, J96.02]  Yes    Type 2 diabetes mellitus with renal complication [E11.29]  Yes    Hypoglycemia [E16.2]  Yes    History of pulmonary embolism [Z86.711]  Yes    COPD (chronic obstructive pulmonary disease) [J44.9]  Yes    Anemia in ESRD (end-stage renal disease) [N18.6, D63.1]  Yes     Chronic    HLD (hyperlipidemia) [E78.5]  Yes     Chronic    Chronic diastolic heart failure [I50.32]  Yes     Chronic    ESRD (end stage renal disease) [N18.6]  Yes      Resolved Hospital Problems    Diagnosis Date Resolved POA    Palliative care encounter [Z51.5] 10/26/2023 Not Applicable    Advance care planning [Z71.89] 10/27/2023 Not Applicable    Hyponatremia [E87.1] 10/16/2023 No       Patient is homebound due to:  Acute on chronic respiratory failure with hypoxia and hypercapnia    Allergies:Review of patient's allergies indicates:  No Active Allergies    Vitals:  Routine    Diet: diabetic diet: 2000 calorie, renal diet, fluid restriction: 1000, and soft diet    Activities:   Activity as tolerated    Goals of Care Treatment Preferences:  Code Status: Full Code      Labs:  None    Nursing Precautions:  Aspiration  and Pressure ulcer prevention    Consults:   PT to evaluate and treat- 3 times a week, OT to evaluate and treat- 3 times a week, and Nutrition to evaluate and recommend diet   Respiratory therapy to monitor O2 saturations       Miscellaneous Care: Routine Skin for Bedridden Patients:  Apply moisture barrier cream to all  Diabetes Care: Diabetes: Check  blood sugar. Fingerstick blood sugar AC and HS  Sliding Scale/Hypoglycemia Protocol: Low dose: Novolog/Humalog correction based on before meal glucose:  180-230: add 1 unit   231-280: add 2 units   281-330: add 3 units   331-380: add 4 units   >380: add 5 units                   Diabetes Care:  SN to perform and educate Diabetic management with blood glucose monitoring:, Fingerstick blood sugar AC and HS, and Report CBG < 60 or > 350 to physician.    Oxygen therapy:  Nasal cannula O2 continuously 5L NC  Goal sats >90%  Pulse Ox checks with vitals    Bipap nightly   Bilevel   Fio2 65%  Inspiratory pressure 18  Expiratory pressure 8    Medications: Discontinue all previous medication orders, if any. See new list below.     Medication List        START taking these medications      insulin lispro 100 unit/mL pen  Commonly known as: HumaLOG KwikPen Insulin  Inject 5 Units into the skin 3 (three) times daily with meals. 5 units TID with meals and additional sliding scale PRN as below    Low dose: Novolog/Humalog correction based on before meal glucose:  180-230: add 1 unit   231-280: add 2 units   281-330: add 3 units   331-380: add 4 units   >380: add 5 units            CHANGE how you take these medications      acetaminophen 650 MG Tbsr  Commonly known as: TYLENOL  Take 1 tablet (650 mg total) by mouth every 8 (eight) hours as needed (pain or fever over 100.4).  What changed: when to take this     albuterol 90 mcg/actuation inhaler  Commonly known as: PROVENTIL/VENTOLIN HFA  Inhale 2 puffs into the lungs 4 (four) times daily as needed for Shortness of Breath or Wheezing (breathing).  What changed: reasons to take this     apixaban 2.5 mg Tab  Commonly known as: ELIQUIS  Take 1 tablet (2.5 mg total) by mouth 2 (two) times daily.  What changed:   medication strength  how much to take     NEPRO CARB STEADY 0.08 gram-1.8 kcal/mL Liqd  Generic drug: nut.tx.imp.renal fxn,lac-reduc  Take 1 Units by mouth once daily. Give 1  carton by mouth with each meal.  What changed:   medication strength  how much to take  how to take this  when to take this            CONTINUE taking these medications      aspirin 81 MG EC tablet  Commonly known as: ECOTRIN  Take 1 tablet (81 mg total) by mouth once daily.     atorvastatin 40 MG tablet  Commonly known as: LIPITOR  Take 1 tablet (40 mg total) by mouth every evening.     carvediloL 12.5 MG tablet  Commonly known as: COREG  Take 1 tablet (12.5 mg total) by mouth 2 (two) times daily.     cetirizine 10 MG tablet  Commonly known as: ZYRTEC  Take 1 tablet (10 mg total) by mouth daily as needed (itching).     FLUoxetine 40 MG capsule  Take 1 capsule (40 mg total) by mouth once daily.     fluticasone-salmeterol 250-50 mcg/dose 250-50 mcg/dose diskus inhaler  Commonly known as: ADVAIR  Inhale 1 puff into the lungs 2 (two) times daily.     isosorbide mononitrate 30 MG 24 hr tablet  Commonly known as: IMDUR  Take 1 tablet (30 mg total) by mouth once daily.     LIDOcaine 4 % Ptmd  Apply 2 patches topically once daily. Apply to area of greatest pain     lisinopriL 40 MG tablet  Commonly known as: PRINIVIL,ZESTRIL  Take 1 tablet (40 mg total) by mouth every evening.     melatonin 3 mg Tbdl  Take 9 mg by mouth nightly as needed (sleep).     mineral oil-hydrophil petrolat Oint  Commonly known as: AQUAPHOR  Apply topically 2 (two) times a day. Bilateral hands     NIFEdipine 60 MG (OSM) 24 hr tablet  Commonly known as: PROCARDIA-XL  Take 1 tablet (60 mg total) by mouth 2 (two) times a day.     ondansetron 8 MG tablet  Commonly known as: ZOFRAN  Take 1 tablet (8 mg total) by mouth 3 (three) times daily as needed for Nausea.     Saline NasaL 0.65 % nasal spray  Generic drug: sodium chloride  2 sprays by Nasal route 3 (three) times daily.     sevelamer carbonate 800 mg Tab  Commonly known as: RENVELA  Take 1 tablet (800 mg total) by mouth 3 (three) times daily with meals.     tiotropium bromide 2.5 mcg/actuation  inhaler  Commonly known as: SPIRIVA RESPIMAT  Inhale 2 puffs into the lungs Daily.     vitamin renal formula (B-complex-vitamin c-folic acid) 1 mg Cap  Commonly known as: NEPHROCAP  Take 1 capsule by mouth once daily.     white petrolatum ointment  Commonly known as: VASELINE  Apply topically 2 (two) times a day. Apply small amount to both nostrils.            STOP taking these medications      cloNIDine 0.3 mg/24 hr td ptwk 0.3 mg/24 hr  Commonly known as: CATAPRES     diclofenac sodium 1 % Gel  Commonly known as: VOLTAREN     LEVEMIR FLEXPEN 100 unit/mL (3 mL) Inpn pen  Generic drug: insulin detemir U-100 (Levemir)     oxymetazoline 0.05 % nasal spray  Commonly known as: AFRIN (OXYMETAZOLINE)                Immunizations Administered as of 10/30/2023       Name Date Dose VIS Date Route Exp Date    COVID-19, MRNA, LN-S, PF (Moderna) 1/27/2022 -- -- -- --    COVID-19, MRNA, LN-S, PF (Pfizer) (Purple Cap) 3/2/2021 -- -- -- --    : Pfizer Inc     Lot: [id number]     COVID-19, MRNA, LN-S, PF (Pfizer) (Purple Cap) 2/9/2021 -- -- -- --    : Pfizer Inc     Lot: [id number]               _________________________________  Cecy Sierra MD  10/30/2023

## 2023-10-30 NOTE — SUBJECTIVE & OBJECTIVE
"Interval HPI:   Overnight events:  BG with down trend overnight despite no basal insulin.  Variable BG and labile at times still.  Plan for DC today likely.      Eatin%  Nausea: No  Hypoglycemia and intervention: BG down to 60, asymptomatic  Fever: No  TPN and/or TF: No  If yes, type of TF/TPN and rate: NA    BP (!) 141/66 (BP Location: Right arm, Patient Position: Lying)   Pulse 72   Temp 97.9 °F (36.6 °C) (Oral)   Resp 17   Ht 5' 9" (1.753 m)   Wt 65 kg (143 lb 4.8 oz)   SpO2 (!) 93%   BMI 21.16 kg/m²     Labs Reviewed and Include    Recent Labs   Lab 10/30/23  0458   GLU 66*   CALCIUM 7.5*   ALBUMIN 2.2*   PROT 6.0   *   K 5.2*   CO2 23   CL 98   BUN 49*   CREATININE 4.3*   ALKPHOS 191*   ALT 25   AST 25   BILITOT 0.7     Lab Results   Component Value Date    WBC 3.48 (L) 10/30/2023    HGB 8.0 (L) 10/30/2023    HCT 25.5 (L) 10/30/2023    MCV 93 10/30/2023     10/30/2023     No results for input(s): "TSH", "FREET4" in the last 168 hours.  Lab Results   Component Value Date    HGBA1C 8.4 (H) 10/12/2023       Nutritional status:   Body mass index is 21.16 kg/m².  Lab Results   Component Value Date    ALBUMIN 2.2 (L) 10/30/2023    ALBUMIN 2.1 (L) 10/29/2023    ALBUMIN 2.1 (L) 10/28/2023     No results found for: "PREALBUMIN"    Estimated Creatinine Clearance: 16.8 mL/min (A) (based on SCr of 4.3 mg/dL (H)).    Accu-Checks  Recent Labs     10/28/23  0810 10/28/23  1220 10/28/23  1607 10/28/23  1946 10/28/23  2342 10/29/23  0355 10/29/23  0847 10/29/23  1225 10/29/23  1642 10/30/23  0816   POCTGLUCOSE 148* 262* 268* 316* 291* 135* 182* 111* 169* 60*       Current Medications and/or Treatments Impacting Glycemic Control  Immunotherapy:    Immunosuppressants       None          Steroids:   Hormones (From admission, onward)      Start     Stop Route Frequency Ordered    10/23/23 0118  melatonin tablet 9 mg         -- Oral Nightly PRN 10/23/23 0018          Pressors:    Autonomic Drugs (From " admission, onward)      None          Hyperglycemia/Diabetes Medications:   Antihyperglycemics (From admission, onward)      Start     Stop Route Frequency Ordered    10/30/23 1130  insulin aspart U-100 pen 4 Units         -- SubQ 3 times daily with meals 10/30/23 0840    10/29/23 2045  insulin aspart U-100 pen 0-5 Units         -- SubQ Before meals & nightly PRN 10/29/23 1991

## 2023-10-31 NOTE — ASSESSMENT & PLAN NOTE
Recent Labs     10/29/23  1642 10/29/23  1952 10/30/23  0816 10/30/23  1007 10/30/23  1137 10/30/23  1715   POCTGLUCOSE 169* 113* 60* 132* 91 177*     On low dose SS  Hypoglycemia protocol

## 2023-10-31 NOTE — ASSESSMENT & PLAN NOTE
Patient with Hypoxic Respiratory failure which is Acute.  he is not on home oxygen. Supplemental oxygen was provided and noted.  Signs/symptoms of respiratory failure include- respiratory distress. Contributing diagnoses includes - ARDS Labs and images were reviewed. Patient Has recent ABG, which has been reviewed. Will treat underlying causes and adjust management of respiratory failure as follows- continuing antibiotics and antivirals, adjustments of mechanical ventilation, HD and fluid regulation.      Extubated in ICU to 5L NC morning of 10/13. In no acute distress  VBG 7.2552  However mental status improving, now out of restraints >24 hours  -Currently on regular NC, wean as tolerated    - discharge on supplemental O2 and bipap qhs

## 2023-10-31 NOTE — ASSESSMENT & PLAN NOTE
ECHO 09/23:        Left Ventricle: The left ventricle is normal in size. Increased wall thickness. There is concentric hypertrophy. Mild global hypokinesis present. There is mildly reduced systolic function with a visually estimated ejection fraction of 40 - 45%. There is indeterminate diastolic function.    Right Ventricle: Normal right ventricular cavity size. Right ventricle wall motion  is normal. Systolic function is borderline low.    Left Atrium: Left atrium is severely dilated.    Tricuspid Valve: There is mild regurgitation.    Pericardium: There is a trivial effusion. Echodensity seen adjacent to the visceral pericardium of uncertain significance. This may be pericardial fat although other etiologies not excluded. Correlate clinically.    PASP is at least 46 mmHg.     BNP > 4900     --Volume removal per iHD. Appreciate nephrology assistance   -- Cont home nifedipine 60 mg qd and lisinopril 40 mg qd  - cont bipap qhs

## 2023-10-31 NOTE — DISCHARGE SUMMARY
[facility] - Intensive Care ([place] [address])  [facility] Medicine  Discharge Summary      Patient Name: Cosme Lewis  MRN: [id number]  MARCY: [id number]  Patient Class: IP- Inpatient  Admission Date: 10/11/2023  Hospital Length of Stay: 19 days  Discharge Date and Time: 10/30/2023  7:14 PM  Attending Physician: No att. providers found   Discharging Provider: Cecy Sierra MD  Primary Care Provider: Eliecer Ohara III, MD  [facility] Medicine Team: [facility] Cecy Sierra MD  Primary Care Team: [facility]    HPI:   Patient is very pleasant, was getting dialysis.  Denies any complaints.  Reports that his shortness of breath is better than it has ever been.  Denies any chest pain or abdominal pain, and reports the only thing that is bothersome is his Sob. He reiterated that it is better than it is ever been      * No surgery found *      Hospital Course:   58 year old male with PMH notable for COPD, HFrEF (45% and DD), ESRD on dialysis - MWF, HTN, DM presents via EMS from nursing home for altered mental status and hypoxia.  Per EMS, patient was found to have glucose of 38, he received D50 with improvement in his glucose.  He also had SpO2 58% which improved with 15 L nasal cannula.   ED work up revealed VBG 7.27/61 and he was subsequently placed on bipap for work of breathing. He was persistently hypoglycemic with BG 59 --> 34 despite IV dextrose, therefore he was started on a continuous dextrose infusion. Other lab work up revealed WBC 3, stable, H//H 9.5/29, Na 143, CO2 20, Cr/BUN 3.8/38, elevated AST/ALT/alk phos, and troponin 0.174 with no ischemic changes on EKG. He was given IV rocephin and doxycyline. Chest XR with bilateral opacifications R > L.  Critical care consulted for acute hypoxemic respiratory failure and NIPPV. Found to have COVID pneumonia with overlapping community acquired pneumonia.   ICU Course:  In the MICU, the patient arrived on bipap but was found to still be hypoxic which initially improved  after adjusting his bipap settings. He continued to be altered and tried occasionally became combative requiring precedex which was affective when maxed out at 1.4 mcg/kg/hour. During this time he was also found to have worsening hypoglycemia which required continuous D10 administration at a max rate of 200 cc/hour. Eventually his oxygen status started declining and the decision was made to intubate the patient. It is believed that worsening of his oxygen status was in part due the large volume of D10 he received to maintain his blood sugars. For this reason, it was decided that D20 at a slower rate would be more appropriate, and for this a central line was placed. Additionally, he received a round of hemodialysis. He is receiving remdesivir for COVID and vacomycin/zosyn/doxycycline for community acquired pneumonia coverage/concerning chest x-ray. The patient's sugars started rising on the D20 and eventually he started becoming hyperglycemic  to the 220s which led to the D20 being stopped for the time being and since that time the sugars have been relatively normoglycemic. His abdomen was found to be slightly more distended but KUB was negative for bowel obstruction, likely suggesting ascites as the source.  On 10/12 after passing SAT and SBT, the decision was made to extubate the patient which was done successfully. All sedatives were turned off at this time as well. He was placed on high flow at the time and has been sating well. He continued to receive antibiotics as well as antivirals for CAP coverage/COVID respectively which was able to help improve his likely pneumonia vs ARDS. Between 10/14 - 10/17 he went back and forth between requiring high flow and bipap, however starting on 10/17 he was able to tolerate nasal canula.     Stepped down to hospital medicine. Having intermittent delirium overnight pulling at O2, requiring placement of restraints. Transitioned to HFNC given desaturations. Delirium precautions  added.   Endocrine following and adjusting insulin given intermittent hypoglycemia resolved. Weaned to regular NC and out of restraints. BG stable with stopping levemir. Stable for discharge back to NH.        Goals of Care Treatment Preferences:  Code Status: Full Code      Consults:   Consults (From admission, onward)        Status Ordering Provider     Inpatient consult to PICC team (RUSTS)  Once        Provider:  (Not yet assigned)    Completed MARIO CONTRERAS     Inpatient consult to Midline team  Once        Provider:  (Not yet assigned)    Completed TORRIE CONTRERASAAMAR     Inpatient consult to PICC team (NIAS)  Once        Provider:  (Not yet assigned)    Completed MARIO CONTRERAS     Inpatient consult to Midline team  Once        Provider:  (Not yet assigned)    Completed BRANDEN SCHWAB     Inpatient consult to Endocrinology  Once        Provider:  (Not yet assigned)    Completed FRANCISCO SAM     Inpatient consult to Palliative Care  Once        Provider:  (Not yet assigned)    Completed FRANCISCO SAM     Inpatient consult to Midline team  Once        Provider:  (Not yet assigned)    Completed BRANDEN SCHWAB     Inpatient consult to Registered Dietitian/Nutritionist  Once        Provider:  (Not yet assigned)    Completed REINA GOTTLIEB     Inpatient consult to Nephrology  Once        Provider:  (Not yet assigned)    Completed JACOB JARA     Inpatient consult to Critical Care Medicine  Once        Provider:  (Not yet assigned)    Completed DE ALMARAZ          Pulmonary  * Acute on chronic respiratory failure with hypoxia and hypercapnia  Patient with Hypercapnic and Hypoxic Respiratory failure which is Chronic.  he is not on home oxygen. Supplemental oxygen was provided and noted-  Signs/symptoms of respiratory failure include- tachypnea, increased work of breathing and use of accessory muscles. Contributing diagnoses includes - CHF, COPD and fluid volume excess Labs and images were  reviewed. Patient Has recent ABG, which has been reviewed. Will treat underlying causes and adjust management of respiratory failure as follows-      58 year old male with multiple medical problems who [place] [facility] [place] for AMS, hypoglycemia, and hypoxia placed on NIPPV. Per chart review patient with room air saturation in 50s which improved with HFNC, however, he remained tachypnic with accessory muscle use, therefore he was placed on NIPPV. Unclear if patient requires oxygen at baseline. Previous hospitalizations with oxygen use. CXR with bilateral opacifications R > L and BNP elevated > 4900.      Admitted to ICU, Intubated and mechanically ventilated, extubated to NC 10/13, sating well  Chest x-ray concerning for pneumonia vs ARDS     - Treated for COVID-19 pneumonia: Remdesivir completed. Dexamethasone 6 mg daily completed  - CAP coverage with Vanc/Zosyn completed   - Received HDper renal recs  - Duoneb nebulizer, tiotropium and Fluticasone furoate / Vilanterol   - supplemental O2, wean as tolerated. Currently on regular NC  - stable for discharge with supplemental O2 and bipap qhs    Dyspnea  - see resp failure  - cont supportive NC, wean as tolerated      Acute respiratory failure with hypoxia and hypercapnia  Patient with Hypoxic Respiratory failure which is Acute.  he is not on home oxygen. Supplemental oxygen was provided and noted.  Signs/symptoms of respiratory failure include- respiratory distress. Contributing diagnoses includes - ARDS Labs and images were reviewed. Patient Has recent ABG, which has been reviewed. Will treat underlying causes and adjust management of respiratory failure as follows- continuing antibiotics and antivirals, adjustments of mechanical ventilation, HD and fluid regulation.      Extubated in ICU to 5L NC morning of 10/13. In no acute distress  VBG 7.2552  However mental status improving, now out of restraints >24 hours  -Currently on regular NC, wean as tolerated     - discharge on supplemental O2 and bipap qhs      COPD (chronic obstructive pulmonary disease)  --Duo nebs Q4wake  --Currently on regular nasal canula  --Fluticasone furoate / Vilanterol and Salmeterol     Cardiac/Vascular  HLD (hyperlipidemia)   LFT's have improved, cont atorvastatin 40 mg daily      Chronic diastolic heart failure    ECHO 09/23:        Left Ventricle: The left ventricle is normal in size. Increased wall thickness. There is concentric hypertrophy. Mild global hypokinesis present. There is mildly reduced systolic function with a visually estimated ejection fraction of 40 - 45%. There is indeterminate diastolic function.    Right Ventricle: Normal right ventricular cavity size. Right ventricle wall motion  is normal. Systolic function is borderline low.    Left Atrium: Left atrium is severely dilated.    Tricuspid Valve: There is mild regurgitation.    Pericardium: There is a trivial effusion. Echodensity seen adjacent to the visceral pericardium of uncertain significance. This may be pericardial fat although other etiologies not excluded. Correlate clinically.    PASP is at least 46 mmHg.     BNP > 4900     --Volume removal per iHD. Appreciate nephrology assistance   -- Cont home nifedipine 60 mg qd and lisinopril 40 mg qd  - cont bipap qhs    Renal/  ESRD (end stage renal disease)  Nephrology following  Outpatient HD [facility]: [facility]   HD tx days: MWF   HD tx time: 4hrs   HD access: LUE AVF   HD modality: iHD   Residual urine: Anuric   Estimated Creatinine Clearance: 33.8 mL/min (A) (based on SCr of 2.3 mg/dL (H)).   Toelrating iHD well     -- Nephrology consulted for iHD management. Appreciate their assistance   -- CMP daily and trend   -- Avoid nephrotoxins   -- Renally dose all medications to appropriate GFR / CrCl   -- Hgb > 7 and MAP > 65 goals     Hematology  History of pulmonary embolism  Diagnosed 10/2022  Repeat CT 3/2023 with resolution     - cont home eliquis      Oncology  Anemia in ESRD (end-stage renal disease)  - H/H stable  -- CBC daily and trend   -- Transfuse for hgb < 7   -- Receives EPO with iHD    Endocrine  Type 2 diabetes mellitus with renal complication  Recent Labs     10/29/23  1642 10/29/23  1952 10/30/23  0816 10/30/23  1007 10/30/23  1137 10/30/23  1715   POCTGLUCOSE 169* 113* 60* 132* 91 177*     On low dose SS  Hypoglycemia protocol      Hypoglycemia  S/p IV d 20 drip in ICU  Recent Labs     10/29/23  1642 10/29/23  1952 10/30/23  0816 10/30/23  1007 10/30/23  1137 10/30/23  1715   POCTGLUCOSE 169* 113* 60* 132* 91 177*     -Currently on sliding scale insulin and insulin aspart with meals     Endocrinology is adjusting patient's dose.   - intermittent hypoglycemia- endocrine adjusting regimen   - long acting insulin stopped and BG elevated, increased prandial insulin per endo  - discharge without levemir and only short acting with meals and SSI        Final Active Diagnoses:    Diagnosis Date Noted POA    PRINCIPAL PROBLEM:  Acute on chronic respiratory failure with hypoxia and hypercapnia [J96.21, J96.22] 10/12/2022 Yes    Dyspnea [R06.00] 10/20/2023 Yes    Acute respiratory failure with hypoxia and hypercapnia [J96.01, J96.02] 10/11/2023 Yes    Type 2 diabetes mellitus with renal complication [E11.29] 08/20/2023 Yes    Hypoglycemia [E16.2] 07/18/2023 Yes    History of pulmonary embolism [Z86.711] 07/18/2023 Yes    COPD (chronic obstructive pulmonary disease) [J44.9] 03/15/2023 Yes    Anemia in ESRD (end-stage renal disease) [N18.6, D63.1] 11/18/2022 Yes     Chronic    HLD (hyperlipidemia) [E78.5] 11/18/2022 Yes     Chronic    Chronic diastolic heart failure [I50.32] 10/12/2022 Yes     Chronic    ESRD (end stage renal disease) [N18.6]  Yes      Problems Resolved During this Admission:    Diagnosis Date Noted Date Resolved POA    Palliative care encounter [Z51.5] 10/20/2023 10/26/2023 Not Applicable    Advance care planning [Z71.89]  10/20/2023 10/27/2023 Not Applicable    Hyponatremia [E87.1] 03/04/2023 10/16/2023 No       Discharged Condition: stable    Disposition: Home or Self Care    Follow Up:    Patient Instructions:      Diet renal     Diet diabetic     Diet Dysphagia Soft     Notify your health care provider if you experience any of the following:  difficulty breathing or increased cough     Notify your health care provider if you experience any of the following:  redness, tenderness, or signs of infection (pain, swelling, redness, odor or green/yellow discharge around incision site)     Activity as tolerated       Significant Diagnostic Studies: Labs: All labs within the past 24 hours have been reviewed    Pending Diagnostic Studies:     Procedure Component Value Units Date/Time    Comprehensive metabolic panel [[id number]] Collected: 10/19/23 0343    Order Status: Sent Lab Status: In process Updated: 10/19/23 0343    Specimen: Blood     Magnesium [[id number]] Collected: 10/19/23 0343    Order Status: Sent Lab Status: In process Updated: 10/19/23 0343    Specimen: Blood     Phosphorus [[id number]] Collected: 10/19/23 0343    Order Status: Sent Lab Status: In process Updated: 10/19/23 0343    Specimen: Blood          Medications:  Reconciled Home Medications:      Medication List      START taking these medications    insulin lispro 100 unit/mL pen  Commonly known as: HumaLOG KwikPen Insulin  Inject 5 Units into the skin 3 (three) times daily with meals. 5 units TID with meals and additional sliding scale PRN as below    Low dose: Novolog/Humalog correction based on before meal glucose:  180-230: add 1 unit   231-280: add 2 units   281-330: add 3 units   331-380: add 4 units   >380: add 5 units        CHANGE how you take these medications    acetaminophen 650 MG Tbsr  Commonly known as: TYLENOL  Take 1 tablet (650 mg total) by mouth every 8 (eight) hours as needed (pain or fever over 100.4).  What changed: when to take this      albuterol 90 mcg/actuation inhaler  Commonly known as: PROVENTIL/VENTOLIN HFA  Inhale 2 puffs into the lungs 4 (four) times daily as needed for Shortness of Breath or Wheezing (breathing).  What changed: reasons to take this     apixaban 2.5 mg Tab  Commonly known as: ELIQUIS  Take 1 tablet (2.5 mg total) by mouth 2 (two) times daily.  What changed:   · medication strength  · how much to take     NEPRO CARB STEADY 0.08 gram-1.8 kcal/mL Liqd  Generic drug: nut.tx.imp.renal fxn,lac-reduc  Take 1 Units by mouth once daily. Give 1 carton by mouth with each meal.  What changed:   · medication strength  · how much to take  · how to take this  · when to take this        CONTINUE taking these medications    aspirin 81 MG EC tablet  Commonly known as: ECOTRIN  Take 1 tablet (81 mg total) by mouth once daily.     atorvastatin 40 MG tablet  Commonly known as: LIPITOR  Take 1 tablet (40 mg total) by mouth every evening.     carvediloL 12.5 MG tablet  Commonly known as: COREG  Take 1 tablet (12.5 mg total) by mouth 2 (two) times daily.     cetirizine 10 MG tablet  Commonly known as: ZYRTEC  Take 1 tablet (10 mg total) by mouth daily as needed (itching).     FLUoxetine 40 MG capsule  Take 1 capsule (40 mg total) by mouth once daily.     fluticasone-salmeterol 250-50 mcg/dose 250-50 mcg/dose diskus inhaler  Commonly known as: ADVAIR  Inhale 1 puff into the lungs 2 (two) times daily.     isosorbide mononitrate 30 MG 24 hr tablet  Commonly known as: IMDUR  Take 1 tablet (30 mg total) by mouth once daily.     LIDOcaine 4 % Ptmd  Apply 2 patches topically once daily. Apply to area of greatest pain     lisinopriL 40 MG tablet  Commonly known as: PRINIVIL,ZESTRIL  Take 1 tablet (40 mg total) by mouth every evening.     melatonin 3 mg Tbdl  Take 9 mg by mouth nightly as needed (sleep).     mineral oil-hydrophil petrolat Oint  Commonly known as: AQUAPHOR  Apply topically 2 (two) times a day. Bilateral hands     NIFEdipine 60 MG (OSM) 24  hr tablet  Commonly known as: PROCARDIA-XL  Take 1 tablet (60 mg total) by mouth 2 (two) times a day.     ondansetron 8 MG tablet  Commonly known as: ZOFRAN  Take 1 tablet (8 mg total) by mouth 3 (three) times daily as needed for Nausea.     Saline NasaL 0.65 % nasal spray  Generic drug: sodium chloride  2 sprays by Nasal route 3 (three) times daily.     sevelamer carbonate 800 mg Tab  Commonly known as: RENVELA  Take 1 tablet (800 mg total) by mouth 3 (three) times daily with meals.     tiotropium bromide 2.5 mcg/actuation inhaler  Commonly known as: SPIRIVA RESPIMAT  Inhale 2 puffs into the lungs Daily.     vitamin renal formula (B-complex-vitamin c-folic acid) 1 mg Cap  Commonly known as: NEPHROCAP  Take 1 capsule by mouth once daily.     white petrolatum ointment  Commonly known as: VASELINE  Apply topically 2 (two) times a day. Apply small amount to both nostrils.        STOP taking these medications    cloNIDine 0.3 mg/24 hr td ptwk 0.3 mg/24 hr  Commonly known as: CATAPRES     diclofenac sodium 1 % Gel  Commonly known as: VOLTAREN     LEVEMIR FLEXPEN 100 unit/mL (3 mL) Inpn pen  Generic drug: insulin detemir U-100 (Levemir)     oxymetazoline 0.05 % nasal spray  Commonly known as: AFRIN (OXYMETAZOLINE)            Indwelling Lines/Drains at time of discharge:   Lines/Drains/Airways     Drain  Duration                Hemodialysis AV Fistula Left upper arm -- days                Time spent on the discharge of patient: 40 minutes         Cecy Sierra MD  Department of Hospital Medicine  [facility] - Intensive Care ([place]-14)

## 2023-10-31 NOTE — ASSESSMENT & PLAN NOTE
Patient with Hypercapnic and Hypoxic Respiratory failure which is Chronic.  he is not on home oxygen. Supplemental oxygen was provided and noted-  Signs/symptoms of respiratory failure include- tachypnea, increased work of breathing and use of accessory muscles. Contributing diagnoses includes - CHF, COPD and fluid volume excess Labs and images were reviewed. Patient Has recent ABG, which has been reviewed. Will treat underlying causes and adjust management of respiratory failure as follows-      58 year old male with multiple medical problems who [place] [facility] [place] for AMS, hypoglycemia, and hypoxia placed on NIPPV. Per chart review patient with room air saturation in 50s which improved with HFNC, however, he remained tachypnic with accessory muscle use, therefore he was placed on NIPPV. Unclear if patient requires oxygen at baseline. Previous hospitalizations with oxygen use. CXR with bilateral opacifications R > L and BNP elevated > 4900.      Admitted to ICU, Intubated and mechanically ventilated, extubated to NC 10/13, sating well  Chest x-ray concerning for pneumonia vs ARDS     - Treated for COVID-19 pneumonia: Remdesivir completed. Dexamethasone 6 mg daily completed  - CAP coverage with Vanc/Zosyn completed   - Received HDper renal recs  - Duoneb nebulizer, tiotropium and Fluticasone furoate / Vilanterol   - supplemental O2, wean as tolerated. Currently on regular NC  - stable for discharge with supplemental O2 and bipap qhs

## 2023-10-31 NOTE — ASSESSMENT & PLAN NOTE
S/p IV d 20 drip in ICU  Recent Labs     10/29/23  1642 10/29/23  1952 10/30/23  0816 10/30/23  1007 10/30/23  1137 10/30/23  1715   POCTGLUCOSE 169* 113* 60* 132* 91 177*     -Currently on sliding scale insulin and insulin aspart with meals     Endocrinology is adjusting patient's dose.   - intermittent hypoglycemia- endocrine adjusting regimen   - long acting insulin stopped and BG elevated, increased prandial insulin per endo  - discharge without levemir and only short acting with meals and SSI

## 2023-10-31 NOTE — PLAN OF CARE
[facility] - Intensive Care ([facility]-14)  Discharge Final Note    Primary Care Provider: Eliecer Ohara III, MD    Expected Discharge Date: 10/30/2023    Final Discharge Note (most recent)       Final Note - 10/30/23 1336          Final Note    Hospital Resources/Appts/Education Provided Provided education on problems/symptoms using teachback        Post-Acute Status    Post-Acute Authorization Placement     Post-Acute Placement Status Set-up Complete/Auth obtained     Coverage MEDICARE - MEDICARE PART A & B     Discharge Delays None known at this time                     Important Message from Medicare  Important Message from Medicare regarding Discharge Appeal Rights: Explained to patient/caregiver, Signed/date by patient/caregiver, Other (comments) (Verbal from Mother, ADALGISA Rios witnessed)     Date IMM was signed: 10/30/23  Time IMM was signed: 1515      Future Appointments   Date Time Provider Department Center   11/8/2023 10:00 AM Denis Alberts PA-C [facility]   11/16/2023  9:30 AM Snow Calle, ERLIN [facility]   1/25/2024  8:40 AM Elizabeth Crabtree, OD [facility]     CM left VM with  patients Kassandra Irvin (Mother)  [phone] (Mobile) to update discharge plans, patient to return to [facility]. No  medications delivered to bedside.   No  HME/DME recommended. CM spoke with Far[facility]  #[phone] and confirmed that facility can accommodate BiPAP orders.   Follow up appointment[s] scheduled.   [facility] and [facility] requires supplemental oxygen @ 5L and ambulance staff made aware that patient is COVID +.       Blanca Brown RN  Case Management  [facility]  [phone]

## 2023-11-01 PROBLEM — I50.42 CHRONIC COMBINED SYSTOLIC AND DIASTOLIC HEART FAILURE: Status: ACTIVE | Noted: 2022-10-12

## 2023-11-01 PROBLEM — Z66 DNR (DO NOT RESUSCITATE): Status: ACTIVE | Noted: 2023-01-01

## 2023-11-01 PROBLEM — G93.1 ANOXIC BRAIN INJURY: Status: ACTIVE | Noted: 2023-01-01

## 2023-11-01 PROBLEM — I46.9 CARDIAC ARREST: Status: ACTIVE | Noted: 2023-01-01

## 2023-11-01 PROBLEM — I31.39 PERICARDIAL EFFUSION: Status: ACTIVE | Noted: 2023-01-01

## 2023-11-01 PROBLEM — E87.29 METABOLIC ACIDOSIS, INCREASED ANION GAP: Status: ACTIVE | Noted: 2023-01-01

## 2023-11-01 PROBLEM — J96.01 ACUTE HYPOXIC RESPIRATORY FAILURE: Status: ACTIVE | Noted: 2023-01-01

## 2023-11-01 NOTE — ASSESSMENT & PLAN NOTE
HD through LUE AV fistula - per chart review last HD on 10/30. Current chemistries stable. BUN/Cr appears at baseline  -Trend CMPs   -Strict I&Os  -Low threshold for emergent HD. Will need trialysis line for CRRT.

## 2023-11-01 NOTE — ED TRIAGE NOTES
Patient arrived by ems from [facility]. Patient was last seen normal at 21:10 going to bed. At 22:10 patient found unresponsive by staff & they started CPR. Hx of COPD and supposed to wear bipap at night, but was found with it off. It's reported that pt has hx of removing bipap while he sleeps. On ems arrival, pt had pink frothy sputum. CPR continued & intubated by ems on scene. EMS achieved ROSC at 22:45. CBG 32 & given 2 amps of D50 total. Also received 5 rounds of epi, 1 bicarb, 1g calcium , 450mL normal saline, and 1 push dose epi per ems. Arrives to ER normal sinus 62 & normotensive.

## 2023-11-01 NOTE — ASSESSMENT & PLAN NOTE
Out-of-hospital cardiac arrest in the setting of suspected hypoxia. Pt had CBG 32 & given 2 amps of D50 total. Also received 5 rounds of epi, 1 bicarb, 1g calcium , 450mL normal saline, and 1 push dose epi per ems. Arrives to ER normal sinus 62 & normotensive. EKG notable for irregularly irregular bradycardia and global low voltage. Bedside echo in the ED notable for diffuse pericardial effusion without tamponade physiology, was also hypothermic on arrival at 88.7. Diffuse watery/bloody diarrhea in ED.    -Broad differential including tachy dysrhythmia, obstructive pathology, endocrinopathy, tox,    -Formal Echo pending, BCx/UCx pending  -On broad spectrum ABx.   -CT Head, CTA chest, A/P pending  -Initial trop 0.04, will trend and add serial EKGs but low suspicion for active ischemic event at this time.   -targeted temperature management in the setting of hypothermia   -Remains off sedation with no purposeful movement

## 2023-11-01 NOTE — H&P
[facility] - Cardiac Medical [facility]  Critical Care Medicine  History & Physical    Patient Name: Cosme Lewis  MRN: [id number]  Admission Date: 10/31/2023  Hospital Length of Stay: 0 days  Code Status: Full Code  Attending Physician: Jhonny Lezama MD   Primary Care Provider: Eliecer Ohara III, MD   Principal Problem: Cardiac arrest    Subjective:     HPI:  58 year old male with medical history significant for COPD, HFrEF (45% and DD), ESRD on dialysis (MWF), HTN, DM who was discharged yesterday after long admission including ICU stay for acute hypoxemic respiratory failure of multfactorial etiology presenting to emergency room after out-of-hospital cardiac arrest where ROSC was achieved. Per chart review, patient was last known normal at 9:10 pm. He was found to be unresponsive an hour later by nursing home staff and CPR was started.  He appeared to have removed his Nightly BiPAP. Pink frothy sputum was noted on arrival per EMS and he was also found to be hypoglycemic with BG of 32. EMS achieved ROSC after about 35 minutes. He received 2 amps of D50, 5 rounds of epi, 1 bicarb, 1g calcium , 450mL normal saline, and 1 push dose epi.    Patient arrived to the emergency room intubated. Large bloody bowel movement was noted. Hypothermic to 90.5 F, Bradycardic but normotensive. CT pan scan pending. Critical care medicine was consulted for lower GI bleed, lactic acidosis and post-cardiac arrest management.       Hospital/ICU Course:  No notes on file     Past Medical History:   Diagnosis Date    CHF (congestive heart failure)     Chronic kidney disease-mineral and bone disorder 10/12/2022    Chronic respiratory failure     COPD (chronic obstructive pulmonary disease)     Diabetes mellitus type 1     ESRD on dialysis     Hypertension     Hypervolemia 02/22/2023    Hypoglycemia 7/18/2023    Hyponatremia 03/04/2023    Hyponatremia 3/4/2023    Other insomnia     Recurrent major depression     SOB (shortness of  breath) 10/12/2022    Patient with history COPD treated with Ellipta the albuterol, fluticasone-salmeterol tachypneic in the ED saturating 94% and 6 L on nasal cannula, patient does not know how many  he uses it is in nursing home.    -duo nebs Q 4  Given that patient is a poor historian, and in the setting of left lower extremity edema and history of coagulopathy PE cannot be ruled out.  Will workup for possible infec    Unspecified lack of coordination        Past Surgical History:   Procedure Laterality Date    AV FISTULA PLACEMENT      FISTULOGRAM Left 8/20/2023    Procedure: Fistulogram;  Surgeon: Duong Aceves MD;  Location: [facility] OR [facility];  Service: Vascular;  Laterality: Left;  1.6 MIN  55.57 mGy  10.4137 Gycm2  24 ml dye  4 ml transradial flush    PHLEBOGRAPHY  8/20/2023    Procedure: VENOGRAM, CENTRAL;  Surgeon: Duong Aceves MD;  [facility];  Service: Vascular;;       Review of patient's allergies indicates:  No Active Allergies    Family History       Problem Relation (Age of Onset)    Diabetes Mother          Tobacco Use    Smoking status: Never    Smokeless tobacco: Never   Substance and Sexual Activity    Alcohol use: Not Currently    Drug use: Not Currently    Sexual activity: Not Currently      Review of Systems   Unable to perform ROS: Intubated     Objective:     Vital Signs (Most Recent):  Pulse: 61 (10/31/23 2348)  Resp: (!) 2 (10/31/23 2348)  BP: 135/86 (10/31/23 2348)  SpO2: 97 % (10/31/23 2348) Vital Signs (24h Range):  Pulse:  [61-63] 61  Resp:  [2] 2  SpO2:  [97 %] 97 %  BP: (135-143)/(76-86) 135/86   Weight: 65 kg (143 lb 4.8 oz)  Body mass index is 21.16 kg/m².    No intake or output data in the 24 hours ending 11/01/23 0039       Physical Exam  Vitals and nursing note reviewed.   Constitutional:       Comments: Intubated, sedated, acutely toxic-appearing, minimally unresponsive    HENT:      Head: Normocephalic and atraumatic.      Right Ear:  External ear normal.      Left Ear: External ear normal.      Nose: Nose normal.      Mouth/Throat:      Comments: ETT in place   Eyes:      Comments: Pupils fixed and dilated    Cardiovascular:      Rate and Rhythm: Normal rate and regular rhythm.      Heart sounds: Normal heart sounds.   Pulmonary:      Effort: Pulmonary effort is normal.      Breath sounds: Normal breath sounds.   Abdominal:      Tenderness: There is no abdominal tenderness. There is no guarding or rebound.   Musculoskeletal:         General: No deformity or signs of injury.   Skin:     General: Skin is dry.      Findings: No rash.   Neurological:      Comments: GCS 3T             Vents:  Vent Mode: A/C (10/31/23 2348)  Ventilator Initiated: Yes (10/31/23 2334)  Set Rate: 32 BPM (10/31/23 2348)  Vt Set: 400 mL (10/31/23 2348)  PEEP/CPAP: 5 cmH20 (10/31/23 2348)  Oxygen Concentration (%): 100 (10/31/23 2348)  Peak Airway Pressure: 28 cmH20 (10/31/23 2348)  Plateau Pressure: 0 cmH20 (10/31/23 2348)  Total Ve: 13.2 L/m (10/31/23 2348)  Negative Inspiratory Force (cm H2O): 0 (10/31/23 2348)  F/VT Ratio<105 (RSBI): (!) 4.84 (10/31/23 2348)  Lines/Drains/Airways       Drain  Duration                  NG/OG Tube 11/01/23 0010 Center mouth <1 day         Urethral Catheter 11/01/23 0009 Temperature probe 16 Fr. <1 day              Airway  Duration                  Airway - Non-Surgical 10/31/23 2337 Endotracheal Tube <1 day              Peripheral Intravenous Line  Duration                  Hemodialysis AV Fistula Left upper arm -- days                  Significant Labs:    CBC/Anemia Profile:  Recent Labs   Lab 10/30/23  0458 10/31/23  2338 10/31/23  2339 10/31/23  2343   WBC 3.48* 3.09*  --   --    HGB 8.0* 7.6*  --   --    HCT 25.5* 25.9* 24* 24*    93*  --   --    MCV 93 100*  --   --    RDW 17.7* 17.3*  --   --         Chemistries:  Recent Labs   Lab 10/30/23  0458 10/31/23  2338   * 133*   K 5.2* 5.2*   CL 98 101   CO2 23 12*   BUN 49*  43*   CREATININE 4.3* 3.7*   CALCIUM 7.5* 7.4*   ALBUMIN 2.2* 1.7*   PROT 6.0 4.9*   BILITOT 0.7 0.8   ALKPHOS 191* 266*   ALT 25 287*   AST 25 590*   MG 2.1 2.3   PHOS 4.8*  --        Recent Lab Results         10/31/23  2343   10/31/23  2343   10/31/23  2339   10/31/23  2338        Albumin       1.7       ALP       266       Allens Test Pass   Pass           ALT       287       Anion Gap       20       AST       590       Baso #       0.02       Basophil %       0.6       BILIRUBIN TOTAL       0.8  Comment: For infants and newborns, interpretation of results should be based  on gestational age, weight and in agreement with clinical  observations.    Premature Infant recommended reference ranges:  Up to 24 hours.............<8.0 mg/dL  Up to 48 hours............<12.0 mg/dL  3-5 days..................<15.0 mg/dL  6-29 days.................<15.0 mg/dL         Site LR   LR           BUN       43       Calcium       7.4       Chloride       101       CO2       12       CPK       200       Creatinine       3.7       Differential Method       Automated       eGFR       17.9       Eos #       0.0       Eosinophil %       0.0       Glucose       161       Gran # (ANC)       1.8       Gran %       58.6       Hematocrit       25.9       Hemoglobin       7.6       Immature Grans (Abs)       0.15  Comment: Mild elevation in immature granulocytes is non specific and   can be seen in a variety of conditions including stress response,   acute inflammation, trauma and pregnancy. Correlation with other   laboratory and clinical findings is essential.         Immature Granulocytes       4.9       INR       2.4  Comment: Coumadin Therapy:  2.0 - 3.0 for INR for all indicators except mechanical heart valves  and antiphospholipid syndromes which should use 2.5 - 3.5.         Lipase       48       Lymph #       0.9       Lymph %       29.8       Magnesium        2.3       MCH       29.5       MCHC       29.3       MCV       100        Mono #       0.2       Mono %       6.1       MPV       11.4       nRBC       1       Platelet Count       93       POC BE   -13           POC BUN     47         POC Chloride     101         POC Creatinine     3.8         POC Glucose     162         POC HCO3   17.6           POC Hematocrit   24   24         POC Ionized Calcium   1.05   0.94         POC Lactate 8.00             POC PCO2   63.3           POC PH   7.052           POC PO2   133           Potassium, Blood Gas   5.3   5.0         POC SATURATED O2   97           Sodium, Blood Gas   133   133         POC TCO2   20           POC TCO2 (MEASURED)     18         Potassium       5.2       PROTEIN TOTAL       4.9       Protime       24.9       RBC       2.58       RDW       17.3       Sample ARTERIAL   ARTERIAL   AMISHA         Sodium       133       Troponin I       0.040  Comment: The reference interval for Troponin I represents the 99th percentile   cutoff   for our facility and is consistent with 3rd generation assay   performance.         TSH       0.851       WBC       3.09             All pertinent labs within the past 24 hours have been reviewed.    Significant Imaging: I have reviewed all pertinent imaging results/findings within the past 24 hours.    Assessment/Plan:     Neuro  Anoxic brain injury  Currently intubated with GCS 3T off all sedation. No purposeful movements. Will attempt to contact family for GOC discussions.   -Ct Head pending  -Tox studies pending  -TSH/T4 pending  -Will trend hypoglycemia q1h, low threshold to start dextrose gtt.   -Neuro-prognostication     Cardiac/Vascular  * Cardiac arrest  Out-of-hospital cardiac arrest in the setting of suspected hypoxia. Pt had CBG 32 & given 2 amps of D50 total. Also received 5 rounds of epi, 1 bicarb, 1g calcium , 450mL normal saline, and 1 push dose epi per ems. Arrives to ER normal sinus 62 & normotensive. EKG notable for irregularly irregular bradycardia and global low voltage. Bedside echo in  the ED notable for diffuse pericardial effusion without tamponade physiology, was also hypothermic on arrival at 88.7. Diffuse watery/bloody diarrhea in ED.    -Broad differential including tachy dysrhythmia, obstructive pathology, endocrinopathy, tox,    -Formal Echo pending, BCx/UCx pending  -On broad spectrum ABx.   -CT Head, CTA chest, A/P pending  -Initial trop 0.04, will trend and add serial EKGs but low suspicion for active ischemic event at this time.   -targeted temperature management in the setting of hypothermia   -Remains off sedation with no purposeful movement    Chronic diastolic heart failure  TTE 9/2023: Mildly reduced systolic function with a visually estimated ejection fraction of 40 - 45%. There is indeterminate diastolic function.  - In the setting of cardiac arrest with ROSC, willl hold GDMT  - Repeat Echo ordered        Renal/  ESRD (end stage renal disease)  HD through LUE AV fistula - per chart review last HD on 10/30. Current chemistries stable. BUN/Cr appears at baseline  -Trend CMPs   -Strict I&Os  -Low threshold for emergent HD. Will need trialysis line for CRRT.     Other  Acute hypoxic respiratory failure  Patient with Hypoxic Respiratory failure which is Acute.  he is not on home oxygen. Supplemental oxygen was provided and noted- Vent Mode: A/C  Oxygen Concentration (%):  [] 60  Resp Rate Total:  [26 br/min-42 br/min] 33 br/min  Vt Set:  [400 mL] 400 mL  PEEP/CPAP:  [5 cmH20] 5 cmH20  Mean Airway Pressure:  [12 cmH20-15 cmH20] 12 cmH20    .   Signs/symptoms of respiratory failure include- respiratory distress. Contributing diagnoses includes - COPD Labs and images were reviewed. Patient Has recent ABG, which has been reviewed. Will treat underlying causes and adjust management of respiratory failure as follows    COPD on QHS BiPAP and was found unresponsive off BiPAP prior to ACLS start. Presented intubated. Vent settings as above.   -Wean vent as tolerated  -ABG PRN  -CXR  with R interstitial opacifications concerning for possible aspiration. CT PE study pending for further characterization           Critical Care Daily Checklist:    A: Awake: RASS Goal/Actual Goal:    Actual:     B: Spontaneous Breathing Trial Performed?     C: SAT & SBT Coordinated?  n/a                      D: Delirium: CAM-ICU     E: Early Mobility Performed? No   F: Feeding Goal:    Status:     Current Diet Order   Procedures    Diet NPO      AS: Analgesia/Sedation n/a   T: Thromboembolic Prophylaxis n/a   H: HOB > 300 Yes   U: Stress Ulcer Prophylaxis (if needed) famotidine   G: Glucose Control n/a   B: Bowel Function     I: Indwelling Catheter (Lines & Dennis) Necessity dennis   D: De-escalation of Antimicrobials/Pharmacotherapies Vanc, cef    Plan for the day/ETD Neuro-prog    Code Status:  Family/Goals of Care: Full Code         Critical secondary to Patient has a condition that poses threat to life and bodily function: cardiac arrest    N/A  Family history is reviewed and has not changed      Critical care was time spent personally by me on the following activities: development of treatment plan with patient or surrogate and bedside caregivers, discussions with consultants, evaluation of patient's response to treatment, examination of patient, ordering and performing treatments and interventions, ordering and review of laboratory studies, ordering and review of radiographic studies, pulse oximetry, re-evaluation of patient's condition. This critical care time did not overlap with that of any other provider or involve time for any procedures.     Uche Marina MD  Critical Care Medicine  [facility] - Cardiac Medical ICU

## 2023-11-01 NOTE — ASSESSMENT & PLAN NOTE
Results for orders placed during the hospital encounter of 10/31/23    Echo    Interpretation Summary    Left Ventricle: The left ventricle is normal in size. Increased wall thickness. There is concentric hypertrophy. Severe global hypokinesis present. There is severely reduced systolic function with a visually estimated ejection fraction of 20 - 25%. Grade I diastolic dysfunction.    Right Ventricle: Moderate right ventricular enlargement. There is hypertrophy. Right ventricle wall motion has global hypokinesis. Systolic function is severely reduced.    Tricuspid Valve: There is mild regurgitation.    IVC/SVC: Patient is ventilated, cannot use IVC diameter to estimate right atrial pressure.    Pericardium: There is a large circumferential effusion. No indication of cardiac tamponade. Evidence includes no chamber collapse. Left pleural effusion.    [facility] with EF > 55%, normal diastolic function. He then had a TTE during admission at [facility] 10/2022, which was notable for EF of 40% and G2 DD. It was recommended he undergo ischemic work up outpatient, however this did not occur and it appears he has not followed up with a cardiologist.

## 2023-11-01 NOTE — HPI
59yo M w history of HFrEF (EF 45%), ESRD on HD MWF, COPD, DM, and htn admitted 10/31 for out of hospital cardiac arrest. Notably, he had been hospitalized 10/11-10/30 for acute hypoxic respiratory failure 2/2 COVID PNA/CAP requiring intubation, all complicated by refractory hypoglycemia. He was stabilized and discharged back to NH (where he had been living previously) with NC. That evening, he was found unresponsive 2/2 cardiac arrest. He underwent CPR for 35 minutes prior to achieving ROSC. He was also noted to be hypoglycemic to 32. On arrival to [facility], he was hypothermic (lowest temp recorded 82.9 F), bradycardic (caren 39 bpm), and had a large, bloody BM. He was admitted to ICU for ventilator support, and later developed hypotension requiring vasopressor support.     While in the ED, bedside echo was concerning for diffuse pericardial effusion without tamponade physiology. CTA chest, abd, pelvis notable for pericardial effusion with right atrial enlargement as well as hepatic edema with venous congestion suggestive of increased right heart pressure. Cardiology consulted for further evaluation.

## 2023-11-01 NOTE — PLAN OF CARE
MICU DAILY GOALS     Family/Goals of care/Code Status   Code Status: Full Code    24H Vital Sign Range  Temp:  [82.9 °F (28.3 °C)-96.4 °F (35.8 °C)]   Pulse:  []   Resp:  [2-34]   BP: ()/(51-99)   SpO2:  [60 %-100 %]   Arterial Line BP: ()/(56-65)      Shift Events   No acute events throughout shift    AWAKE RASS: Goal - RASS Goal: 0-->alert and calm  Actual - RASS (Osorio Agitation-Sedation Scale): unarousable    Restraint necessity: Not necessary   BREATHE SBT: Not attempted    Coordinate A & B, analgesics/sedatives Pain: managed   SAT: Fail   Delirium CAM-ICU: Overall CAM-ICU: Positive   Early(intubated/ Progressive (non-intubated) Mobility MOVE Screen (INTUBATED ONLY): Fail    Activity: Activity Management: Patient unable to perform activities   Feeding/Nutrition Diet order: Diet/Nutrition Received: NPO,     Thrombus DVT prophylaxis: VTE Required Core Measure: Per order contraindicated for SCDs/Anticoagulants   HOB Elevation Head of Bed (HOB) Positioning: HOB at 30-45 degrees   Ulcer Prophylaxis GI: yes   Glucose control managed Glycemic Management: blood glucose monitored   Skin Skin assessed during: Daily Assessment    Sacrum intact? No  Heels intact? Yes  Surgical wound? No    [] No Altered Skin Integrity Present    []Prevention Measures Documented    [x] Altered Skin Integrity Present or Discovered   [x] LDA present /added in EPIC   [x] Wound Image Taken     Wound Care Consulted? Yes    Attending Nurse:  Bre Lagunas RN/Staff Member:  Shayla Thorpe RN   Bowel Function diarrhea    Indwelling Catheter Necessity [REMOVED]      Urethral Catheter 11/01/23 0009 Temperature probe 16 Fr.-Reason for Continuing Urinary Catheterization: Critically ill in ICU and requiring hourly monitoring of intake/output    Trialysis (Dialysis) Catheter 11/01/23 0656 left internal jugular-Line Necessity Review: CRRT/HD  CRRT   De-escalation Antibiotics No       VS and assessment per flow sheet, patient  progressing towards goals as tolerated, plan of care reviewed with  Cosme , all concerns addressed, will continue to monitor.

## 2023-11-01 NOTE — PLAN OF CARE
[facility] - Cardiac Medical [facility]  Initial Discharge Assessment       Primary Care Provider: Eliecer Ohara III, MD    Admission Diagnosis: Cardiac arrest [I46.9]  ESRD on hemodialysis [N18.6, Z99.2]    Admission Date: 10/31/2023  Expected Discharge Date:     Transition of Care Barriers: (P) None    Payor: MEDICARE / Plan: MEDICARE PART A & B / Product Type: Government /     Extended Emergency Contact Information  Primary Emergency Contact: Kassandra Leon  Mobile Phone: [phone]  Relation: Mother    Discharge Plan A: (P) Other (tbd)         [facility]  [address]  [facility] [address]  Phone: [phone] Fax: [phone]      Initial Assessment (most recent)       Adult Discharge Assessment - 11/01/23 1306          Discharge Assessment    Assessment Type Discharge Planning Assessment (P)      Confirmed/corrected address, phone number and insurance Yes (P)      Confirmed Demographics Correct on Facesheet (P)      Source of Information patient;health record (P)      Communicated GERA with patient/caregiver No (P)      People in Home facility resident (P)      Current cognitive status: Coma/Sedated/Intubated (P)      Readmission within 30 days? Yes (P)      Do you take prescription medications? Yes (P)      Do you have prescription coverage? Yes (P)      Do you have any problems affording any of your prescribed medications? No (P)      Is the patient taking medications as prescribed? yes (P)      How do you get to doctors appointments? health plan transportation (P)      Are you on dialysis? Yes (P)      Transition of Care Barriers None (P)      Discharge Plan A Other (P)    tbd       Physical Activity    On average, how many days per week do you engage in moderate to strenuous exercise (like a brisk walk)? 0 days (P)      On average, how many minutes do you engage in exercise at this level? 0 min (P)         Financial Resource Strain    How hard is it for you to pay for the very basics like  food, housing, medical care, and heating? Patient refused (P)         Housing Stability    In the last 12 months, was there a time when you were not able to pay the mortgage or rent on time? Patient refused (P)      In the last 12 months, was there a time when you did not have a steady place to sleep or slept in a shelter (including now)? Patient refused (P)         Transportation Needs    In the past 12 months, has lack of transportation kept you from medical appointments or from getting medications? Patient refused (P)      In the past 12 months, has lack of transportation kept you from meetings, work, or from getting things needed for daily living? Patient refused (P)         Food Insecurity    Within the past 12 months, you worried that your food would run out before you got the money to buy more. Patient refused (P)      Within the past 12 months, the food you bought just didn't last and you didn't have money to get more. Patient refused (P)         Stress    Do you feel stress - tense, restless, nervous, or anxious, or unable to sleep at night because your mind is troubled all the time - these days? Patient refused (P)         Social Connections    In a typical week, how many times do you talk on the phone with family, friends, or neighbors? Patient refused (P)      How often do you get together with friends or relatives? Patient refused (P)      How often do you attend Restorationist or Pentecostal services? Patient refused (P)      How often do you attend meetings of the clubs or organizations you belong to? Patient refused (P)      Are you , , , , never , or living with a partner? Patient refused (P)         Alcohol Use    Q1: How often do you have a drink containing alcohol? Patient refused (P)      Q2: How many drinks containing alcohol do you have on a typical day when you are drinking? Patient refused (P)      Q3: How often do you have six or more drinks on one occasion?  Patient refused (P)

## 2023-11-01 NOTE — ASSESSMENT & PLAN NOTE
Patient is MWF hemodialyisis via LUE AVF, last underwent dialysis on Monday 10/30. Current labs on admission showed acidosis, but otherwise consistent with ESRD.    ESRD on iHD MWF  [facility]  4 hours  EDW: 71 kg  UMA AVF    Recommendations  -Will plan for SLED/SCUF today to assist in his acidosis clearance. Will plan for UF removal as tolerated.   -continue strict I/O's  -RFP daily  -renal diet when not NPO.  -Phos elevated at this time, was WNL on 10/30. Recommend repeating phos levels.     Anemia of chronic disease  Lab Results   Component Value Date    HGB 7.5 (L) 11/01/2023    HCT 24.8 (L) 11/01/2023       Calcium   Date Value Ref Range Status   11/01/2023 7.2 (L) 8.7 - 10.5 mg/dL Final   10/31/2023 7.4 (L) 8.7 - 10.5 mg/dL Final     Phosphorus   Date Value Ref Range Status   11/01/2023 7.8 (H) 2.7 - 4.5 mg/dL Final   10/30/2023 4.8 (H) 2.7 - 4.5 mg/dL Final

## 2023-11-01 NOTE — HPI
Mr. Lewis is a 58 year old male with a PMH of ESRD on hemodialysis MWF via LUE AVF, HTN, HFrEF, DM. He was recently discharged from the hospital on 10/30 after a prolonged hospital stay for altered mental status, acute on chronic hypoxic respiratory failure with hypercapnia, covid pneumonia and CAP. He was subsequently discharged to [facility] where he was last seen well around 2110. Staff checked on him again at 2210 and noted him to be unresponsive without a pulse. ACLS was initiated, when EMS arrived, they noted that he had pink frothy sputum presenting from his mouth, blood glucose was 32. In total he received 35 minutes of CPR, 2 amps of D50W, 5 rounds of epinephrine, 1 push of sodium bicarbonate, 1g of calcium, 450 mL of normal saline. As per chart review, it is thought that the patient removed his BiPAP which resulted in his cardiac arrest. Patient was intubated by EMS in the field. Patient reportedly had a large bloody bowel movement in the ER.     He underwent CT scan of the head, abdomen, pelvis, which showed no acute intracranial abnormalities, no PE, positive for potential pericardial effusion, right hepatic vein reflux of IV contrast, bilateral lung opacities, stable bilateral pleural effusions, diffuse small bowel wall thickening, stable ascites, and sternal fractures.     Labs on arrival showed a hemoglobin of 7.6, Na of 133, K of 5.2, Cl of 101, bicarb of 12 with AG of 20, BUN 43, and Cr of 3.7. He was started on Levophed to maintain MAP> 65mmHg and a right sided trialysis line was placed.

## 2023-11-01 NOTE — CONSULTS
"  [facility] - Cardiac Medical ICU  Adult Nutrition  Consult Note    SUMMARY     Recommendations    1. Initiate TFs when able. Rec'd Novasource @ 40 mL/hr to provide 1920 kcals, 87 g of protein, 688 mL fluid.   2. RD to monitor & follow-up.    Goals: Meet % EEN, EPN by RD f/u date  Nutrition Goal Status: new  Communication of RD Recs: discussed on rounds    Assessment and Plan    Nutrition Problem:  Inadequate energy intake    Related to (etiology):   Inability to consume sufficient energy     Signs and Symptoms (as evidenced by):   NPO    Interventions(treatment strategy):  Collaboration of nutrition care w/ other providers  EN    Nutrition Diagnosis Status:   New    Reason for Assessment    Reason For Assessment: consult  Diagnosis: other (see comments) (Cardiac arrest)  Relevant Medical History: HF, ESRD on HD, DM  Interdisciplinary Rounds: attended    General Information Comments: Intubated, presents from NH - information obtained from RD assessment 10/26: Pt w/ excellent appetite PTA & throughout admit with UBW of 160#. 14# weight loss noted, likely fluid related. Will monitor.  Nutrition Discharge Planning: Pending clinical course    Nutrition/Diet History    Factors Affecting Nutritional Intake: on mechanical ventilation, NPO    Anthropometrics    Temp: 96.1 °F (35.6 °C)  Height: 5' 9" (175.3 cm)  Height (inches): 69 in  Weight Method: Estimated  Weight: 66.2 kg (145 lb 15.1 oz)  Weight (lb): 145.95 lb  Ideal Body Weight (IBW), Male: 160 lb  % Ideal Body Weight, Male (lb): 91.22 %  BMI (Calculated): 21.5  BMI Grade: 18.5-24.9 - normal    Lab/Procedures/Meds    Pertinent Labs Reviewed: reviewed  Pertinent Labs Comments: Creat 3.6, GFR 18.5, P 7.8, A1C 8.4  Pertinent Medications Reviewed: reviewed  Pertinent Medications Comments: D10, Levophed    Estimated/Assessed Needs    Weight Used For Calorie Calculations: 66.2 kg (145 lb 15.1 oz)    Energy Calorie Requirements (kcal): 1851 kcal/d  Energy Need Method: " [facility]    Protein Requirements:  g/d (1.2-1.5 g/kg)  Weight Used For Protein Calculations: 66.2 kg (145 lb 15.1 oz)    Estimated Fluid Requirement Method: other (see comments) (Per MD or 1 mL/kcal)  RDA Method (mL): 1851    CHO Requirement: 231g    Nutrition Prescription Ordered    Current Diet Order: NPO    Evaluation of Received Nutrient/Fluid Intake    I/O: +1.9L since admit    Comments: LBM: 11/1    Nutrition Risk    Level of Risk/Frequency of Follow-up:  (1x/week)     Monitor and Evaluation    Food and Nutrient Intake: enteral nutrition intake, food and beverage intake, energy intake  Food and Nutrient Adminstration: enteral and parenteral nutrition administration, diet order  Physical Activity and Function: nutrition-related ADLs and IADLs  Anthropometric Measurements: weight, weight change  Biochemical Data, Medical Tests and Procedures: glucose/endocrine profile, lipid profile, inflammatory profile, gastrointestinal profile, electrolyte and renal panel  Nutrition-Focused Physical Findings: overall appearance     Nutrition Follow-Up    RD Follow-up?: Yes

## 2023-11-01 NOTE — ED PROVIDER NOTES
CC: Cardiac Arrest      History provided by:   EMS    HPI: Cosme Lewis is a 58 y.o. year old male who presents to the ED s/p arrest with hypoglycemia. Nursing home resident, on BIPAP at night for COPD. Last known normal at 9:10 pm, found unresponsive at 10:10 pm, per EMS patient was in asystole for approx 20 min, defib x 1 for V fib, epi x 5, calcium, D50 x20, bicarb. Achieved ROSC prior to arrival to the ED, intubated by EMS    Full code per EMS            PHYSICAL EXAM:  There were no vitals filed for this visit.  VS: triage VS reviewed     Physical exam  Unresponsive, intubated, not on sedation or paralytics  [place] device attached to his chest but not active chest compressions. Ant chest skin jas from the [place] device  strong palpable femoral pulse  RRR  ETT with b/l breath sounds with BVM with b/l diffuse crackles, small amount of blood in the oral cavity  Pupils dilated unreactive  Dry diaper  Abdomen soft   LE no edema or erythema  No spontaneous movements  Left arm AV fistula no erythema      MDM:  Cosme Lewis is a 58 y.o. year old male who presents to the ED for s/p unwitnessed arrest at the nursing home, per EMS asystole though one episodes of defib for V fib.    On arrival intubated, unresponsive, dilated non reactive pupils, no spontaneous movement, started on the ventilator. Concern for anoxic brain injury, CT head ordered    Ekg no STEMI    DDX includes but not limited to: per EMS, on BIPAP at night, chart reviewed:recent admission for respiratory failure, possible respiratory arrest. Oxygen sat wnl on the vent  Bedside echo with circumferential pericardial effusion, full IVC (on the vent) but no other findings (no chamber collapse) to suggest pericardial tamponade. Also ascites and left pleural effusion. Also RV dilation, echo result compared to prior echo: new RV dilatation, possible PE? Patient on anticoagulation based don chart review, also hx of PE    MAP >65, no pressors or IVF (plethoric IVC,  ascites, pleural effusion concerning for fluid overload)  Post arrest broad spectrum antibiotics  CT head r/o anoxic brain injury    Shortly after arrival noticed to have a very large bloody BM. Istat with Hct close to Hct in the last 24 h. On apixaban, PCC ordered, 2 u PRBC prep    CTA chest r/o PE (unlikely candidate for thrombolytics or anticoagulation due to large low GI bleed)  CTA a/p r/o GI bleed. PPI    No STEMI on EKG or hyperK/arrhythmia  Lactate 8  pH 7.05 bicarbonate 1 amp  Istat with cr 3/8 (ESRD), K 5, bicarb 18, Hct 24  Cmp with elevation in LFTs  CBC with Hg 7.6. Plt 93  Mg wnl  Trop 0.04    Lipase wnl  Inr 2.4    Labs ordered and reviewed: as above    Medication given in the ED: pcc, antibiotics, bicarbonate      EKG (independantly reviewed): as above    Old records obtained and reviewed: yes, recent admission for acute resp failure    Case discussed with the consultant: medical ICU        IMPRESSION:  1.) unwitnessed cardiorespiratory arrest  2.) severe lactic acidosis  3. Lower GI bleed  4. Circumferential pericardial effusion  5. Ascites  6. Left pleural effusion  7. Hypoglycemia (per EMS)    Dispo: admitted to medical ICU    Aggregate Critical Care Time by Attending (exclusive of procedural time) = 45 minutes         During the Emergency Depment visit, there was a high probability of imminent or life threatening deterioration in the patient's condition necessitating medical decision  making of a high complexity. Organ systems that were compromised/potentially compromised                      central nervous system                      cardiovascular                      hepatic                      pulmonary                      renal     and/or there was potential for sepsis or metabolic failure.     Pt required constant monitoring because of the potential for them to deteriorate at any moment. Critical care was time spent personally by me on the following activities:  Development of  treatment plan; discussion with consultant, evaluation of patient's response to treatment, examination of patient, obtaining history from EMS, ordering and performing treatments and interventions, ordering and reviewing of laboratory studies, ordering and review of radiographic studies, vitals, re-evaluation of patient' condition and review of old charts            The failure to initiate the interventions performed in the Emergency Department on an urgent basis would have likely resulted in sudden, clinically significant or life threatening deterioration in the patient's condition.                              Past Medical History:   Diagnosis Date    CHF (congestive heart failure)     Chronic kidney disease-mineral and bone disorder 10/12/2022    Chronic respiratory failure     COPD (chronic obstructive pulmonary disease)     Diabetes mellitus type 1     ESRD on dialysis     Hypertension     Hypervolemia 02/22/2023    Hypoglycemia 7/18/2023    Hyponatremia 03/04/2023    Hyponatremia 3/4/2023    Other insomnia     Recurrent major depression     SOB (shortness of breath) 10/12/2022    Patient with history COPD treated with Ellipta the albuterol, fluticasone-salmeterol tachypneic in the ED saturating 94% and 6 L on nasal cannula, patient does not know how many  he uses it is in nursing home.    -duo nebs Q 4  Given that patient is a poor historian, and in the setting of left lower extremity edema and history of coagulopathy PE cannot be ruled out.  Will workup for possible infec    Unspecified lack of coordination      Past Surgical History:   Procedure Laterality Date    AV FISTULA PLACEMENT      FISTULOGRAM Left 8/20/2023    Procedure: Fistulogram;  Surgeon: Duong Aceves MD;  Location: [facility] OR [address] [place] [address];  Service: Vascular;  Laterality: Left;  1.6 MIN  55.57 mGy  10.4137 Gycm2  24 ml dye  4 ml transradial flush    PHLEBOGRAPHY  8/20/2023    Procedure: VENOGRAM, CENTRAL;  Surgeon: Duong Aceves MD;   Location: [facility] OR [address] [place] [address];  Service: Vascular;;     Family History   Problem Relation Age of Onset    Diabetes Mother      No current facility-administered medications on file prior to encounter.     Current Outpatient Medications on File Prior to Encounter   Medication Sig Dispense Refill    acetaminophen (TYLENOL) 650 MG TbSR Take 1 tablet (650 mg total) by mouth every 8 (eight) hours as needed (pain or fever over 100.4).  0    albuterol (PROVENTIL/VENTOLIN HFA) 90 mcg/actuation inhaler Inhale 2 puffs into the lungs 4 (four) times daily as needed for Shortness of Breath or Wheezing (breathing). 18 g 0    apixaban (ELIQUIS) 2.5 mg Tab Take 1 tablet (2.5 mg total) by mouth 2 (two) times daily.      aspirin (ECOTRIN) 81 MG EC tablet Take 1 tablet (81 mg total) by mouth once daily.  0    atorvastatin (LIPITOR) 40 MG tablet Take 1 tablet (40 mg total) by mouth every evening. 90 tablet 3    carvediloL (COREG) 12.5 MG tablet Take 1 tablet (12.5 mg total) by mouth 2 (two) times daily. 60 tablet 11    cetirizine (ZYRTEC) 10 MG tablet Take 1 tablet (10 mg total) by mouth daily as needed (itching).  0    FLUoxetine 40 MG capsule Take 1 capsule (40 mg total) by mouth once daily.      fluticasone-salmeterol diskus inhaler 250-50 mcg Inhale 1 puff into the lungs 2 (two) times daily.  0    insulin lispro (HUMALOG KWIKPEN INSULIN) 100 unit/mL pen Inject 5 Units into the skin 3 (three) times daily with meals. 5 units TID with meals and additional sliding scale PRN as below    Low dose: Novolog/Humalog correction based on before meal glucose:  180-230: add 1 unit   231-280: add 2 units   281-330: add 3 units   331-380: add 4 units   >380: add 5 units 4.5 mL 11    isosorbide mononitrate (IMDUR) 30 MG 24 hr tablet Take 1 tablet (30 mg total) by mouth once daily. 90 tablet 3    LIDOcaine 4 % PtMd Apply 2 patches topically once daily. Apply to area of greatest pain      lisinopriL (PRINIVIL,ZESTRIL) 40 MG tablet Take 1 tablet (40 mg  total) by mouth every evening. 90 tablet 3    melatonin 3 mg TbDL Take 9 mg by mouth nightly as needed (sleep).      mineral oil-hydrophil petrolat (AQUAPHOR) Oint Apply topically 2 (two) times a day. Bilateral hands 454 g 0    NIFEdipine (PROCARDIA-XL) 60 MG (OSM) 24 hr tablet Take 1 tablet (60 mg total) by mouth 2 (two) times a day. 60 tablet 11    nut.tx.imp.renal fxn,lac-reduc (NEPRO CARB STEADY) 0.08 gram-1.8 kcal/mL Liqd Take 1 Units by mouth once daily. Give 1 carton by mouth with each meal.      ondansetron (ZOFRAN) 8 MG tablet Take 1 tablet (8 mg total) by mouth 3 (three) times daily as needed for Nausea.      sevelamer carbonate (RENVELA) 800 mg Tab Take 1 tablet (800 mg total) by mouth 3 (three) times daily with meals.      sodium chloride (SALINE NASAL) 0.65 % nasal spray 2 sprays by Nasal route 3 (three) times daily. 104 mL 12    tiotropium bromide (SPIRIVA RESPIMAT) 2.5 mcg/actuation inhaler Inhale 2 puffs into the lungs Daily.      vitamin renal formula, B-complex-vitamin c-folic acid, (NEPHROCAP) 1 mg Cap Take 1 capsule by mouth once daily.  0    white petrolatum (VASELINE) ointment Apply topically 2 (two) times a day. Apply small amount to both nostrils. 5 g 0     Patient has no active allergies.  Social History     Socioeconomic History    Marital status:    Tobacco Use    Smoking status: Never    Smokeless tobacco: Never   Substance and Sexual Activity    Alcohol use: Not Currently    Drug use: Not Currently    Sexual activity: Not Currently     Social Determinants of Health     Financial Resource Strain: Low Risk  (10/13/2023)    Overall Financial Resource Strain (CARDIA)     Difficulty of Paying Living Expenses: Not hard at all   Food Insecurity: No Food Insecurity (10/13/2023)    Hunger Vital Sign     Worried About Running Out of Food in the Last Year: Never true     Ran Out of Food in the Last Year: Never true   Transportation Needs: No Transportation Needs (10/13/2023)    PRAPARE -  Transportation     Lack of Transportation (Medical): No     Lack of Transportation (Non-Medical): No   Physical Activity: Inactive (10/13/2023)    Exercise Vital Sign     Days of Exercise per Week: 0 days     Minutes of Exercise per Session: 0 min   Stress: No Stress Concern Present (10/13/2023)    [facility] of Occupational Health - Occupational Stress Questionnaire     Feeling of Stress : Only a little   Recent Concern: Stress - Stress Concern Present (8/22/2023)    [facility] of Occupational Health - Occupational Stress Questionnaire     Feeling of Stress : To some extent   Social Connections: Socially Isolated (10/13/2023)    Social Connection and Isolation Panel [NHANES]     Frequency of Communication with Friends and Family: More than three times a week     Frequency of Social Gatherings with Friends and Family: Never     Attends Judaism Services: Never     Active Member of Clubs or Organizations: No     Attends Club or Organization Meetings: Never     Marital Status: Never    Housing Stability: Low Risk  (10/13/2023)    Housing Stability Vital Sign     Unable to Pay for Housing in the Last Year: No     Number of Places Lived in the Last Year: 1     Unstable Housing in the Last Year: No                 DATA & INTERVENTIONS:    LABS reviewed:  Labs Reviewed   CULTURE, BLOOD   CULTURE, BLOOD   COMPREHENSIVE METABOLIC PANEL   CBC W/ AUTO DIFFERENTIAL   MAGNESIUM   TSH   TROPONIN I   CK   B-TYPE NATRIURETIC PEPTIDE   DRUG SCREEN PANEL, URINE EMERGENCY   LIPASE   ISTAT CHEM8       RADIOLOGY reviewed:  Imaging Results    None         MEDICATIONS/FLUIDS:  Medications - No data to display           Carline Kerr MD  11/01/23 6729

## 2023-11-01 NOTE — PROGRESS NOTES
Pharmacokinetic Initial Assessment: IV Vancomycin    Assessment/Plan:    Initiate intravenous vancomycin with loading dose of 1250 mg once with subsequent doses when random concentrations are less than 20 mcg/mL  Desired empiric serum trough concentration is 15 to 20 mcg/mL  Draw vancomycin random level on 11/2 at 0300.  Pharmacy will continue to follow and monitor vancomycin.      Please contact pharmacy at extension [phone] with any questions regarding this assessment.     Thank you for the consult,   Elena Mcdonald       Patient brief summary:  Cosme Lewis is a 58 y.o. male initiated on antimicrobial therapy with IV Vancomycin for treatment of suspected bacteremia    Drug Allergies:   Review of patient's allergies indicates:  No Active Allergies    Actual Body Weight:   65 kg    Renal Function:   Estimated Creatinine Clearance: 19.5 mL/min (A) (based on SCr of 3.7 mg/dL (H)).    Dialysis Method (if applicable):  intermittent HD    CBC (last 72 hours):  Recent Labs   Lab Result Units 10/29/23  0518 10/30/23  0458 10/31/23  2338 11/01/23  0200   WBC K/uL 3.59* 3.48* 3.09* 1.24*   Hemoglobin g/dL 8.4* 8.0* 7.6* 7.5*   Hematocrit % 26.3* 25.5* 25.9* 24.4*   Platelets K/uL 186 162 93* 91*   Gran % % 70.6 72.4 58.6 85.0*   Lymph % % 14.8* 15.2* 29.8 7.0*   Mono % % 12.3 11.5 6.1 4.0   Eosinophil % % 0.3 0.3 0.0 0.0   Basophil % % 1.4 0.6 0.6 0.0   Differential Method  Automated Automated Automated Manual       Metabolic Panel (last 72 hours):  Recent Labs   Lab Result Units 10/29/23  0518 10/30/23  0458 10/31/23  2338   Sodium mmol/L 135* 132* 133*   Potassium mmol/L 4.7 5.2* 5.2*   Chloride mmol/L 99 98 101   CO2 mmol/L 22* 23 12*   Glucose mg/dL 151* 66* 161*   BUN mg/dL 35* 49* 43*   Creatinine mg/dL 3.6* 4.3* 3.7*   Albumin g/dL 2.1* 2.2* 1.7*   Total Bilirubin mg/dL 0.7 0.7 0.8   Alkaline Phosphatase U/L 202* 191* 266*   AST U/L 28 25 590*   ALT U/L 26 25 287*   Magnesium mg/dL 2.1 2.1 2.3   Phosphorus mg/dL 4.2  "4.8*  --        Drug levels (last 3 results):  No results for input(s): "VANCOMYCINRA", "VANCORANDOM", "VANCOMYCINPE", "VANCOPEAK", "VANCOMYCINTR", "VANCOTROUGH" in the last 72 hours.    Microbiologic Results:  Microbiology Results (last 7 days)       Procedure Component Value Units Date/Time    Blood culture [[id number]] Collected: 11/01/23 0211    Order Status: Sent Specimen: Blood from Peripheral, Lower Leg, Right Updated: 11/01/23 0303    Blood culture [[id number]] Collected: 11/01/23 0221    Order Status: Sent Specimen: Blood from Peripheral, Upper Arm, Right Updated: 11/01/23 0303    Blood culture #1 **CANNOT BE ORDERED STAT** [[id number]] Collected: 10/31/23 2337    Order Status: Sent Specimen: Blood from Peripheral, Lower Arm, Left Updated: 10/31/23 2353    Blood culture #2 **CANNOT BE ORDERED STAT** [[id number]] Collected: 10/31/23 2337    Order Status: Sent Specimen: Blood from Peripheral, Lower Arm, Left Updated: 10/31/23 2353            "

## 2023-11-01 NOTE — ASSESSMENT & PLAN NOTE
59yo M w history of HFrEF (EF 45%), ESRD on HD MWF, COPD, DM, htn, and recent hospitalization (discharged 10/30) for AHRF 2/2 COVID PNA vs CAP requiring intubation admitted 10/31 for out of hospital cardiac arrest. ROSC achieved after 35 minutes of CPR. He was also noted to be hypoglycemic to 32. On arrival to [facility], he was hypothermic, bradycardic, and had a large, bloody BM. He is now in the ICU on ventilator and vasopressor support.  - Bedside echo concerning for diffuse pericardial effusion without tamponade physiology. CTA CAP notable for pericardial effusion with right atrial enlargement as well as hepatic edema with venous congestion suggestive of increased right heart pressure.   Formal TTE with circumferential effusion without indicated of cardiac tamponade; no chamber collapse    Recommendations:  - No indication for cardiac tamponade at this time  - Medical management per primary  - Hold GDMT for rEF given acute, critical illness  - Outpatient cardiology follow up

## 2023-11-01 NOTE — PLAN OF CARE
Admitting critical care team overnight attempted to call patient's mother (POA) on admission without a response. During the day the critical care team attempted multiple times to contact the mother without response. Palliative care team consulted for assistance with family contact. Pall care unable to contact mother either, left several voice mails without return calls. Patient's NH was contacted, and they did not have contact with any other family members. Nursing home also attempted to contact mother without success. Case discussed with SW as well and we will attempt a wellness check on patient's mom. Patient is currently critically ill in the ICU, ventilated and off sedation without any purposeful movement, on dialysis and on pressor support. It is highly unlikely that he will survive this hospitalization. Along these lines, should the patient experience another cardiac arrest, ACLS and chest compressions would not provide any meaningful benefit and would only cause harm at the patient's end of life. In light of this and our inability to contact any family or POA, we have obtained 2 physician consent to change patient's code status to DNR. If patient arrests, we will allow him to die a natural death. We will continue all other current forms of life-saving treatment and medications at this time while we continue to attempt to contact next of kin.     Carlos Mitchell MD  Critical Care Medicine  [facility]

## 2023-11-01 NOTE — CONSULTS
Consult received. Pt seen and examined. Will be admitted to critical care medicine. Full H&P to follow.     [facility]-  Critical Care Medicine  11/01/2023 1:34 AM

## 2023-11-01 NOTE — CONSULTS
[facility] - Cardiac Medical [facility]  Nephrology  Consult Note    Patient Name: Cosme Lewis  MRN: [id number]  Admission Date: 10/31/2023  Hospital Length of Stay: 0 days  Attending Provider: Jhonny Lezama MD   Primary Care Physician: Eliecer Ohara III, MD  Principal Problem:Cardiac arrest    Inpatient consult to Nephrology  Consult performed by: Dami Tony MD  Consult ordered by: Vita Cano DNP  Reason for consult: ESRD        Subjective:     HPI: Mr. Lewis is a 58 year old male with a PMH of ESRD on hemodialysis MWF via LUE AVF, HTN, HFrEF, DM. He was recently discharged from the hospital on 10/30 after a prolonged hospital stay for altered mental status, acute on chronic hypoxic respiratory failure with hypercapnia, covid pneumonia and CAP. He was subsequently discharged to [facility] where he was last seen well around 2110. Staff checked on him again at 2210 and noted him to be unresponsive without a pulse. ACLS was initiated, when EMS arrived, they noted that he had pink frothy sputum presenting from his mouth, blood glucose was 32. In total he received 35 minutes of CPR, 2 amps of D50W, 5 rounds of epinephrine, 1 push of sodium bicarbonate, 1g of calcium, 450 mL of normal saline. As per chart review, it is thought that the patient removed his BiPAP which resulted in his cardiac arrest. Patient was intubated by EMS in the field. Patient reportedly had a large bloody bowel movement in the ER.     He underwent CT scan of the head, abdomen, pelvis, which showed no acute intracranial abnormalities, no PE, positive for potential pericardial effusion, right hepatic vein reflux of IV contrast, bilateral lung opacities, stable bilateral pleural effusions, diffuse small bowel wall thickening, stable ascites, and sternal fractures.     Labs on arrival showed a hemoglobin of 7.6, Na of 133, K of 5.2, Cl of 101, bicarb of 12 with AG of 20, BUN 43, and Cr of 3.7. He was started on Levophed  to maintain MAP> 65mmHg and a right sided trialysis line was placed.       Past Medical History:   Diagnosis Date    CHF (congestive heart failure)     Chronic kidney disease-mineral and bone disorder 10/12/2022    Chronic respiratory failure     COPD (chronic obstructive pulmonary disease)     Diabetes mellitus type 1     ESRD on dialysis     Hypertension     Hypervolemia 02/22/2023    Hypoglycemia 7/18/2023    Hyponatremia 03/04/2023    Hyponatremia 3/4/2023    Other insomnia     Recurrent major depression     SOB (shortness of breath) 10/12/2022    Patient with history COPD treated with Ellipta the albuterol, fluticasone-salmeterol tachypneic in the ED saturating 94% and 6 L on nasal cannula, patient does not know how many  he uses it is in nursing home.    -duo nebs Q 4  Given that patient is a poor historian, and in the setting of left lower extremity edema and history of coagulopathy PE cannot be ruled out.  Will workup for possible infec    Unspecified lack of coordination        Past Surgical History:   Procedure Laterality Date    AV FISTULA PLACEMENT      FISTULOGRAM Left 8/20/2023    Procedure: Fistulogram;  Surgeon: Duong Aceves MD;  Location: [facility] OR [address] [place] [address];  Service: Vascular;  Laterality: Left;  1.6 MIN  55.57 mGy  10.4137 Gycm2  24 ml dye  4 ml transradial flush    PHLEBOGRAPHY  8/20/2023    Procedure: VENOGRAM, CENTRAL;  Surgeon: Duong Aceves MD;  Location: [facility] OR [address] [place] [address];  Service: Vascular;;       Review of patient's allergies indicates:  No Active Allergies  Current Facility-Administered Medications   Medication Frequency    0.9%  NaCl infusion (for blood administration) Q24H PRN    chlorhexidine 0.12 % solution 15 mL BID    dextrose 10% bolus 125 mL 125 mL PRN    dextrose 10% bolus 250 mL 250 mL PRN    dextrose 20 % infusion Continuous    glucagon (human recombinant) injection 1 mg PRN    glucose chewable tablet 16 g PRN    glucose chewable tablet 24  g PRN    hydrocortisone sodium succinate injection 100 mg Q8H    NORepinephrine bitartrate-D5W 4 mg/250 mL (16 mcg/mL) PERIPHERAL access infusion Continuous    piperacillin-tazobactam (ZOSYN) 4.5 g in dextrose 5 % in water (D5W) 100 mL IVPB (MB+) Q12H    sodium bicarbonate solution 50 mEq ED 1 Time    sodium chloride 0.9% flush 10 mL PRN    vancomycin - pharmacy to dose pharmacy to manage frequency     Family History       Problem Relation (Age of Onset)    Diabetes Mother          Tobacco Use    Smoking status: Never    Smokeless tobacco: Never   Substance and Sexual Activity    Alcohol use: Not Currently    Drug use: Not Currently    Sexual activity: Not Currently     Review of Systems   Reason unable to perform ROS: Intubated and non-responsive.     Objective:     Vital Signs (Most Recent):  Temp: (!) 94.8 °F (34.9 °C) (11/01/23 0811)  Pulse: 65 (11/01/23 0811)  Resp: (!) 34 (11/01/23 0811)  BP: 110/61 (11/01/23 0811)  SpO2: 97 % (11/01/23 0811) Vital Signs (24h Range):  Temp:  [82.9 °F (28.3 °C)-95.7 °F (35.4 °C)] 94.8 °F (34.9 °C)  Pulse:  [39-72] 65  Resp:  [2-34] 34  SpO2:  [90 %-100 %] 97 %  BP: ()/(51-86) 110/61     Weight: 66.2 kg (146 lb) (11/01/23 0811)  Body mass index is 21.56 kg/m².  Body surface area is 1.8 meters squared.    I/O last 3 completed shifts:  In: 1901.9 [I.V.:99.3; IV Piggyback:1802.7]  Out: -      Physical Exam  Vitals reviewed.   Constitutional:       Comments: Intubated, non-responsive   HENT:      Head: Normocephalic.      Mouth/Throat:      Comments: Intubated.   Cardiovascular:      Rate and Rhythm: Bradycardia present.      Pulses: Normal pulses.      Comments: Undergoing therapeutic hypothermia  Pulmonary:      Breath sounds: Rhonchi present.      Comments: Mechanical breath sounds  Abdominal:      General: Abdomen is flat.      Palpations: Abdomen is soft.   Musculoskeletal:      Right lower leg: No edema.      Left lower leg: No edema.   Skin:     General: Skin  is warm.      Capillary Refill: Capillary refill takes 2 to 3 seconds.      Comments: Bruising anterior sternum.    Neurological:      Comments: Non-responsive          Significant Labs:  CBC:   Recent Labs   Lab 11/01/23  0554   WBC 2.26*   RBC 2.56*   HGB 7.5*   HCT 24.8*   *   MCV 97   MCH 29.3   MCHC 30.2*     CMP:   Recent Labs   Lab 11/01/23  0200   GLU 43*   CALCIUM 7.2*   ALBUMIN 1.7*   PROT 4.8*   *   K 4.9   CO2 15*      BUN 45*   CREATININE 3.6*   ALKPHOS 309*   *   *   BILITOT 0.9     All labs within the past 24 hours have been reviewed.    Significant Imaging:  CT: Reviewed      Assessment/Plan:     Neuro  Anoxic brain injury  -Per primary team    Cardiac/Vascular  * Cardiac arrest  Per primary team.     Pericardial effusion  -Per primary team.     Chronic combined systolic and diastolic heart failure  -Per primary team.     Renal/  Metabolic acidosis, increased anion gap  Patient presented with triple acid-base disorder. HAGMA with Anion gap of 20 and serum bicarb of 12. Patient also has a concurrent respiratory acidosis (measured pCP2=38 and expected of 28-32), as well as a non-anion gap metabolic acidosis.     Suspect HAGMA secondary to his lactic acidosis, and NAGMA due to hsi renal failure.     Recommendations  --Will plan on 12 hours SLED then 12 hours SCUF today to help with clearance of his acidosis and volume control.  --Respiratory acidosis per primary team.     ESRD (end stage renal disease)  Patient is MWF hemodialyisis via LUE AVF, last underwent dialysis on Monday 10/30. Current labs on admission showed acidosis, but otherwise consistent with ESRD.    ESRD on iHD F  [facility]  4 hours  EDW: 71 kg  UMA AVF    Recommendations  -Will plan for SLED/SCUF today to assist in his acidosis clearance. Will plan for UF removal as tolerated.   -continue strict I/O's  -RFP daily  -renal diet when not NPO.  -Phos elevated at this time, was WNL on 10/30.  Recommend repeating phos levels.     Anemia of chronic disease  Lab Results   Component Value Date    HGB 7.5 (L) 11/01/2023    HCT 24.8 (L) 11/01/2023       Calcium   Date Value Ref Range Status   11/01/2023 7.2 (L) 8.7 - 10.5 mg/dL Final   10/31/2023 7.4 (L) 8.7 - 10.5 mg/dL Final     Phosphorus   Date Value Ref Range Status   11/01/2023 7.8 (H) 2.7 - 4.5 mg/dL Final   10/30/2023 4.8 (H) 2.7 - 4.5 mg/dL Final       Other  Acute hypoxic respiratory failure  Per primary team        Thank you for your consult. I will follow-up with patient. Please contact us if you have any additional questions.    Dami Tony MD  Nephrology  [facility] - Cardiac Medical ICU

## 2023-11-01 NOTE — EICU
Intervention Initiated From:  Bedside    Natty intervened regarding:  Documentation and Time-Out    Comments: call received for time out prior to trialysis insertion.  A Haydeege, KUMAR @ bs for procedure.  Time out completed.  LIJ Trialysis placed.  + blood aspirated, stat cxr ordered.    Procedure end 0700

## 2023-11-01 NOTE — CONSULTS
[facility] - Cardiac Medical ICU  Cardiology  Consult Note    Patient Name: Cosme Lewis  MRN: [id number]  Admission Date: 10/31/2023  Hospital Length of Stay: 0 days  Code Status: Full Code   Attending Provider: Jhonny Lezama MD   Consulting Provider: Radha Salvador DO  Primary Care Physician: Eliecer Ohara III, MD  Principal Problem:Cardiac arrest    Patient information was obtained from past medical records and ER records.     Inpatient consult to Cardiology  Consult performed by: Radha Salvador DO  Consult ordered by: Uche Marina MD        Subjective:     Chief Complaint:  Cardiac arrest     HPI:   57yo M w history of HFrEF (EF 45%), ESRD on HD MWF, COPD, DM, and htn admitted 10/31 for out of hospital cardiac arrest. Notably, he had been hospitalized 10/11-10/30 for acute hypoxic respiratory failure 2/2 COVID PNA/CAP requiring intubation, all complicated by refractory hypoglycemia. He was stabilized and discharged back to NH (where he had been living previously) with NC. That evening, he was found unresponsive 2/2 cardiac arrest. He underwent CPR for 35 minutes prior to achieving ROSC. He was also noted to be hypoglycemic to 32. On arrival to [facility], he was hypothermic (lowest temp recorded 82.9 F), bradycardic (caren 39 bpm), and had a large, bloody BM. He was admitted to ICU for ventilator support, and later developed hypotension requiring vasopressor support.     While in the ED, bedside echo was concerning for diffuse pericardial effusion without tamponade physiology. CTA chest, abd, pelvis notable for pericardial effusion with right atrial enlargement as well as hepatic edema with venous congestion suggestive of increased right heart pressure. Cardiology consulted for further evaluation.    Past Medical History:   Diagnosis Date    CHF (congestive heart failure)     Chronic kidney disease-mineral and bone disorder 10/12/2022    Chronic respiratory failure     COPD (chronic obstructive pulmonary disease)      Diabetes mellitus type 1     ESRD on dialysis     Hypertension     Hypervolemia 02/22/2023    Hypoglycemia 7/18/2023    Hyponatremia 03/04/2023    Hyponatremia 3/4/2023    Other insomnia     Recurrent major depression     SOB (shortness of breath) 10/12/2022    Patient with history COPD treated with Ellipta the albuterol, fluticasone-salmeterol tachypneic in the ED saturating 94% and 6 L on nasal cannula, patient does not know how many  he uses it is in nursing home.    -duo nebs Q 4  Given that patient is a poor historian, and in the setting of left lower extremity edema and history of coagulopathy PE cannot be ruled out.  Will workup for possible infec    Unspecified lack of coordination        Past Surgical History:   Procedure Laterality Date    AV FISTULA PLACEMENT      FISTULOGRAM Left 8/20/2023    Procedure: Fistulogram;  Surgeon: Duong Aceves MD;  Location: [facility] OR [address] [place] [address];  Service: Vascular;  Laterality: Left;  1.6 MIN  55.57 mGy  10.4137 Gycm2  24 ml dye  4 ml transradial flush    PHLEBOGRAPHY  8/20/2023    Procedure: VENOGRAM, CENTRAL;  Surgeon: Duong Aceves MD;  Location: [facility] OR [address] [place] [address];  Service: Vascular;;       Review of patient's allergies indicates:  No Active Allergies    No current facility-administered medications on file prior to encounter.     Current Outpatient Medications on File Prior to Encounter   Medication Sig    acetaminophen (TYLENOL) 650 MG TbSR Take 1 tablet (650 mg total) by mouth every 8 (eight) hours as needed (pain or fever over 100.4).    albuterol (PROVENTIL/VENTOLIN HFA) 90 mcg/actuation inhaler Inhale 2 puffs into the lungs 4 (four) times daily as needed for Shortness of Breath or Wheezing (breathing).    apixaban (ELIQUIS) 2.5 mg Tab Take 1 tablet (2.5 mg total) by mouth 2 (two) times daily.    aspirin (ECOTRIN) 81 MG EC tablet Take 1 tablet (81 mg total) by mouth once daily.    atorvastatin (LIPITOR) 40 MG tablet Take 1 tablet (40 mg total) by mouth  every evening.    carvediloL (COREG) 12.5 MG tablet Take 1 tablet (12.5 mg total) by mouth 2 (two) times daily.    cetirizine (ZYRTEC) 10 MG tablet Take 1 tablet (10 mg total) by mouth daily as needed (itching).    FLUoxetine 40 MG capsule Take 1 capsule (40 mg total) by mouth once daily.    fluticasone-salmeterol diskus inhaler 250-50 mcg Inhale 1 puff into the lungs 2 (two) times daily.    insulin lispro (HUMALOG KWIKPEN INSULIN) 100 unit/mL pen Inject 5 Units into the skin 3 (three) times daily with meals. 5 units TID with meals and additional sliding scale PRN as below    Low dose: Novolog/Humalog correction based on before meal glucose:  180-230: add 1 unit   231-280: add 2 units   281-330: add 3 units   331-380: add 4 units   >380: add 5 units    isosorbide mononitrate (IMDUR) 30 MG 24 hr tablet Take 1 tablet (30 mg total) by mouth once daily.    LIDOcaine 4 % PtMd Apply 2 patches topically once daily. Apply to area of greatest pain    lisinopriL (PRINIVIL,ZESTRIL) 40 MG tablet Take 1 tablet (40 mg total) by mouth every evening.    melatonin 3 mg TbDL Take 9 mg by mouth nightly as needed (sleep).    mineral oil-hydrophil petrolat (AQUAPHOR) Oint Apply topically 2 (two) times a day. Bilateral hands    NIFEdipine (PROCARDIA-XL) 60 MG (OSM) 24 hr tablet Take 1 tablet (60 mg total) by mouth 2 (two) times a day.    nut.tx.imp.renal fxn,lac-reduc (NEPRO CARB STEADY) 0.08 gram-1.8 kcal/mL Liqd Take 1 Units by mouth once daily. Give 1 carton by mouth with each meal.    ondansetron (ZOFRAN) 8 MG tablet Take 1 tablet (8 mg total) by mouth 3 (three) times daily as needed for Nausea.    sevelamer carbonate (RENVELA) 800 mg Tab Take 1 tablet (800 mg total) by mouth 3 (three) times daily with meals.    sodium chloride (SALINE NASAL) 0.65 % nasal spray 2 sprays by Nasal route 3 (three) times daily.    tiotropium bromide (SPIRIVA RESPIMAT) 2.5 mcg/actuation inhaler Inhale 2 puffs into the lungs Daily.    vitamin renal  formula, B-complex-vitamin c-folic acid, (NEPHROCAP) 1 mg Cap Take 1 capsule by mouth once daily.    white petrolatum (VASELINE) ointment Apply topically 2 (two) times a day. Apply small amount to both nostrils.     Family History       Problem Relation (Age of Onset)    Diabetes Mother          Tobacco Use    Smoking status: Never    Smokeless tobacco: Never   Substance and Sexual Activity    Alcohol use: Not Currently    Drug use: Not Currently    Sexual activity: Not Currently     Review of Systems   Unable to perform ROS: Intubated     Objective:     Vital Signs (Most Recent):  Temp: (!) 94.8 °F (34.9 °C) (11/01/23 0811)  Pulse: 65 (11/01/23 0811)  Resp: (!) 34 (11/01/23 0811)  BP: 110/61 (11/01/23 0811)  SpO2: 97 % (11/01/23 0811) Vital Signs (24h Range):  Temp:  [82.9 °F (28.3 °C)-95.7 °F (35.4 °C)] 94.8 °F (34.9 °C)  Pulse:  [39-72] 65  Resp:  [2-34] 34  SpO2:  [90 %-100 %] 97 %  BP: ()/(51-86) 110/61     Weight: 66.2 kg (146 lb)  Body mass index is 21.56 kg/m².    SpO2: 97 %         Intake/Output Summary (Last 24 hours) at 11/1/2023 0909  Last data filed at 11/1/2023 0700  Gross per 24 hour   Intake 1901.91 ml   Output --   Net 1901.91 ml       Lines/Drains/Airways       Central Venous Catheter Line  Duration             Trialysis (Dialysis) Catheter 11/01/23 0656 left internal jugular <1 day              Drain  Duration                  NG/OG Tube 11/01/23 0010 Center mouth <1 day         Rectal Tube 11/01/23 0330 fecal management system <1 day         Urethral Catheter 11/01/23 0009 Temperature probe 16 Fr. <1 day              Airway  Duration                  Airway - Non-Surgical 10/31/23 2337 Endotracheal Tube <1 day              Peripheral Intravenous Line  Duration                  Hemodialysis AV Fistula Left upper arm -- days         Peripheral IV - Single Lumen 11/01/23 0200 18 G Anterior;Right Upper Arm <1 day         Peripheral IV - Single Lumen 11/01/23 0200 20 G Anterior;Proximal;Right  Forearm <1 day                     Physical Exam  Vitals and nursing note reviewed.   Constitutional:       Appearance: He is ill-appearing.   HENT:      Head: Normocephalic and atraumatic.   Cardiovascular:      Rate and Rhythm: Normal rate and regular rhythm.      Comments: Heart sounds muffled, difficult to auscultate 2/2 mechanical ventilation  Pulmonary:      Comments: Mechanically ventilated  Musculoskeletal:      Right lower leg: No edema.      Left lower leg: No edema.   Skin:     General: Skin is dry.   Neurological:      Comments: Unresponsive despite no sedation   Eyes open with spontaneous eye movements, does not track           Significant Labs: All pertinent lab results from the last 24 hours have been reviewed.    Significant Imaging:  Reviewed  Assessment and Plan:     * Cardiac arrest  See pericardial effusion    Pericardial effusion  57yo M w history of HFrEF (EF 45%), ESRD on HD MWF, COPD, DM, htn, and recent hospitalization (discharged 10/30) for AHRF 2/2 COVID PNA vs CAP requiring intubation admitted 10/31 for out of hospital cardiac arrest. ROSC achieved after 35 minutes of CPR. He was also noted to be hypoglycemic to 32. On arrival [place] [facility] he was hypothermic, bradycardic, and had a large, bloody BM. He is now in the ICU on ventilator and vasopressor support.  - Bedside echo concerning for diffuse pericardial effusion without tamponade physiology. CTA CAP notable for pericardial effusion with right atrial enlargement as well as hepatic edema with venous congestion suggestive of increased right heart pressure.   Formal TTE with circumferential effusion without indicated of cardiac tamponade; no chamber collapse    Recommendations:  - No indication for cardiac tamponade at this time  - Medical management per primary  - Hold GDMT for rEF given acute, critical illness  - Outpatient cardiology follow up    Chronic combined systolic and diastolic heart failure  Results for orders placed during the  hospital encounter of 10/31/23    Echo    Interpretation Summary    Left Ventricle: The left ventricle is normal in size. Increased wall thickness. There is concentric hypertrophy. Severe global hypokinesis present. There is severely reduced systolic function with a visually estimated ejection fraction of 20 - 25%. Grade I diastolic dysfunction.    Right Ventricle: Moderate right ventricular enlargement. There is hypertrophy. Right ventricle wall motion has global hypokinesis. Systolic function is severely reduced.    Tricuspid Valve: There is mild regurgitation.    IVC/SVC: Patient is ventilated, cannot use IVC diameter to estimate right atrial pressure.    Pericardium: There is a large circumferential effusion. No indication of cardiac tamponade. Evidence includes no chamber collapse. Left pleural effusion.    [facility] with EF > 55%, normal diastolic function. He then had a TTE during admission at [facility] 10/2022, which was notable for EF of 40% and G2 DD. It was recommended he undergo ischemic work up outpatient, however this did not occur and it appears he has not followed up with a cardiologist.         VTE Risk Mitigation (From admission, onward)           Ordered     IP VTE LOW RISK PATIENT  Once         11/01/23 0019     Place sequential compression device  Until discontinued         11/01/23 0017                    Thank you for your consult. I will sign off. Please contact us if you have any additional questions.    Radha Salvador, DO  Cardiology   [facility] - Cardiac Medical ICU

## 2023-11-01 NOTE — CONSULTS
[facility] - Cardiac Medical ICU  Wound Care    Patient Name:  Cosme Lewis   MRN:  [id number]  Date: 11/1/2023  Diagnosis: Cardiac arrest    History:     Past Medical History:   Diagnosis Date    CHF (congestive heart failure)     Chronic kidney disease-mineral and bone disorder 10/12/2022    Chronic respiratory failure     COPD (chronic obstructive pulmonary disease)     Diabetes mellitus type 1     ESRD on dialysis     Hypertension     Hypervolemia 02/22/2023    Hypoglycemia 7/18/2023    Hyponatremia 03/04/2023    Hyponatremia 3/4/2023    Other insomnia     Recurrent major depression     SOB (shortness of breath) 10/12/2022    Patient with history COPD treated with Ellipta the albuterol, fluticasone-salmeterol tachypneic in the ED saturating 94% and 6 L on nasal cannula, patient does not know how many  he uses it is in nursing home.    -duo nebs Q 4  Given that patient is a poor historian, and in the setting of left lower extremity edema and history of coagulopathy PE cannot be ruled out.  Will workup for possible infec    Unspecified lack of coordination        Social History     Socioeconomic History    Marital status: Single   Tobacco Use    Smoking status: Never    Smokeless tobacco: Never   Substance and Sexual Activity    Alcohol use: Not Currently    Drug use: Not Currently    Sexual activity: Not Currently     Social Determinants of Health     Financial Resource Strain: Unknown (11/1/2023)    Overall Financial Resource Strain (CARDIA)     Difficulty of Paying Living Expenses: Patient refused   Food Insecurity: Unknown (11/1/2023)    Hunger Vital Sign     Worried About Running Out of Food in the Last Year: Patient refused     Ran Out of Food in the Last Year: Patient refused   Transportation Needs: Unknown (11/1/2023)    PRAPARE - Transportation     Lack of Transportation (Medical): Patient refused     Lack of Transportation (Non-Medical): Patient refused   Physical Activity: Inactive (11/1/2023)    Exercise  Vital Sign     Days of Exercise per Week: 0 days     Minutes of Exercise per Session: 0 min   Stress: Unknown (11/1/2023)    [employer] [place] [employer] - Occupational Stress Questionnaire     Feeling of Stress : Patient refused   Recent Concern: Stress - Stress Concern Present (8/22/2023)    [employer] [place] [employer] - Occupational Stress Questionnaire     Feeling of Stress : To some extent   Social Connections: Unknown (11/1/2023)    Social Connection and Isolation Panel [NHANES]     Frequency of Communication with Friends and Family: Patient refused     Frequency of Social Gatherings with Friends and Family: Patient refused     Attends Muslim Services: Patient refused     Active Member of Clubs or Organizations: No     Attends Club or Organization Meetings: Patient refused     Marital Status: Patient refused   Recent Concern: Social Connections - Socially Isolated (10/13/2023)    Social Connection and Isolation Panel [NHANES]     Frequency of Communication with Friends and Family: More than three times a week     Frequency of Social Gatherings with Friends and Family: Never     Attends Muslim Services: Never     Active Member of Clubs or Organizations: No     Attends Club or Organization Meetings: Never     Marital Status: Never    Housing Stability: Unknown (11/1/2023)    Housing Stability Vital Sign     Unable to Pay for Housing in the Last Year: Patient refused     Number of Places Lived in the Last Year: 1     Unstable Housing in the Last Year: Patient refused       Precautions:     Allergies as of 10/31/2023    (No Active Allergies)       [facility] Assessment Details/Treatment   Patient seen for wound care consultation.   Reviewed chart for this encounter.   See Flow Sheet for findings.    Partial thickness skin loss to the chest due to compression device (richardson) used in the ambulance on the way to [facility]. Wound bed is pink and dry. Foam dressing applied. Partial thickness  skin loss to the forehead, present on arrival. Foam dressing applied. Several partial thickness skin loss to the sacrum and buttocks likely due to moisture and friction. Triad applied. Perineum and scrotum are intact, pink and moist. Pt is incontinent and has a rectal tube.     RECOMMENDATIONS:  - Sacrum, perineum, and scrotum: bedside nursing to cleanse with soap and water, pat dry and apply triad BID and PRN; no diapers nor dressings   - Forehead and chest: bedside nursing to cleanse with NS, pat dry and apply a foam dressing every 5 days  - Turning every 2 hours  - Heel protecting boots  - Nursing to maintain pressure injury prevention interventions     11/01/23 1123        Altered Skin Integrity 11/01/23 0701 Frontal region Abrasion(s)   Date First Assessed/Time First Assessed: 11/01/23 0701   Altered Skin Integrity Present on Admission - Did Patient arrive to the hospital with altered skin?: yes  Location: Frontal region  Primary Wound Type: Abrasion(s)   Dressing Appearance Open to air   Drainage Amount None   Appearance Pink;Red;Dry   Tissue loss description Partial thickness   Periwound Area Intact;Dry   Care Cleansed with:;Sterile normal saline   Dressing Applied;Foam        Altered Skin Integrity 11/01/23 0701 lower Sternal Traumatic   Date First Assessed/Time First Assessed: 11/01/23 0701   Altered Skin Integrity Present on Admission - Did Patient arrive to the hospital with altered skin?: (c) yes  Orientation: lower  Location: Sternal  Is this injury device related?: (c) Yes  Prim...   Dressing Appearance Open to air   Appearance Pink;Dry;Moist;Blistered   Tissue loss description Partial thickness   Periwound Area Intact;Dry   Care Cleansed with:;Sterile normal saline   Dressing Applied;Foam        Altered Skin Integrity 10/16/23 0300 Perineum Incontinence associated dermatitis   Date First Assessed/Time First Assessed: 10/16/23 0300   Location: Perineum  Primary Wound Type: (c) Incontinence associated  dermatitis   Dressing Appearance Open to air   Drainage Amount None   Appearance Pink;Red;Moist;Other (see comments)  (blanchable)   Periwound Area Intact;Moist        Altered Skin Integrity 11/01/23 1123 Sacral spine Partial thickness tissue loss. Shallow open ulcer with a red or pink wound bed, without slough. Intact or Open/Ruptured Serum-filled blister.   Date First Assessed/Time First Assessed: 11/01/23 1123   Altered Skin Integrity Present on Admission - Did Patient arrive to the hospital with altered skin?: yes  Location: Sacral spine  Description of Altered Skin Integrity: Partial thickness tissue ...   Description of Altered Skin Integrity Partial thickness tissue loss. Shallow open ulcer with a red or pink wound bed, without slough. Intact or Open/Ruptured Serum-filled blister.   Dressing Appearance Open to air   Drainage Amount Scant   Drainage Characteristics/Odor Serosanguineous   Appearance Pink;Red;Moist   Tissue loss description Partial thickness   Periwound Area Intact;Moist   Care Cleansed with:;Soap and water   Dressing Applied;Other (comment)  (triad)     Forehead      Chest      Sacrum       Recommendations made to primary team for above plan via secure chat. Orders placed. Wound care will follow-up as needed.     11/01/2023

## 2023-11-01 NOTE — ASSESSMENT & PLAN NOTE
Patient with Hypoxic Respiratory failure which is Acute.  he is not on home oxygen. Supplemental oxygen was provided and noted- Vent Mode: A/C  Oxygen Concentration (%):  [] 60  Resp Rate Total:  [26 br/min-42 br/min] 33 br/min  Vt Set:  [400 mL] 400 mL  PEEP/CPAP:  [5 cmH20] 5 cmH20  Mean Airway Pressure:  [12 cmH20-15 cmH20] 12 cmH20    .   Signs/symptoms of respiratory failure include- respiratory distress. Contributing diagnoses includes - COPD Labs and images were reviewed. Patient Has recent ABG, which has been reviewed. Will treat underlying causes and adjust management of respiratory failure as follows    COPD on QHS BiPAP and was found unresponsive off BiPAP prior to ACLS start. Presented intubated. Vent settings as above.   -Wean vent as tolerated  -ABG PRN  -CXR with R interstitial opacifications concerning for possible aspiration. CT PE study pending for further characterization

## 2023-11-01 NOTE — PLAN OF CARE
MICU DAILY GOALS     Family/Goals of care/Code Status   Code Status: Full Code    24H Vital Sign Range  Temp:  [86.4 °F (30.2 °C)-90.5 °F (32.5 °C)]   Pulse:  [39-72]   Resp:  [2-29]   BP: (115-151)/(61-86)   SpO2:  [91 %-100 %]      Shift Events   Pt connected to TTM machine with two sources of temp in place.     AWAKE RASS: Goal - RASS Goal: 0-->alert and calm  Actual - RASS (Osorio Agitation-Sedation Scale): unarousable    Restraint necessity: Not necessary   BREATHE SBT: Not attempted    Coordinate A & B, analgesics/sedatives Pain: managed   SAT: Not attempted   Delirium CAM-ICU:     Early(intubated/ Progressive (non-intubated) Mobility MOVE Screen (INTUBATED ONLY): Not attempted    Activity: Activity Management: Leg kicks - L2   Feeding/Nutrition Diet order:  ,     Thrombus DVT prophylaxis: VTE Required Core Measure: Per order contraindicated for SCDs/Anticoagulants   HOB Elevation Head of Bed (HOB) Positioning: HOB at 30-45 degrees   Ulcer Prophylaxis GI: yes   Glucose control uncontrolled     Skin Skin assessed during: Admit    Sacrum intact? No  Heels intact? Yes  Surgical wound? Yes    [] No Altered Skin Integrity Present    []Prevention Measures Documented    [] Altered Skin Integrity Present or Discovered   [] LDA present /added in EPIC   [] Wound Image Taken     Wound Care Consulted? Yes    Attending Nurse:  Isidro Lagunas RN/Staff Member:      Bowel Function diarrhea    Indwelling Catheter Necessity      Urethral Catheter 11/01/23 0009 Temperature probe 16 Fr.-Reason for Continuing Urinary Catheterization: Critically ill in ICU and requiring hourly monitoring of intake/output          De-escalation Antibiotics Yes       VS and assessment per flow sheet, patient progressing towards goals as tolerated, plan of care reviewed with family, all concerns addressed, will continue to monitor.

## 2023-11-01 NOTE — PLAN OF CARE
11/01/23 1314   Readmission   Why were you readmitted? Alarmed about signs/symptoms   When you left the hospital where did you go? SNF   Did patient/caregiver refused recommended DC plan? No   Did you have  a follow-up appointment on discharge? Yes   Was this a planned readmission? No

## 2023-11-01 NOTE — NURSING
Patients HR increased from NSR (60-70s) to ST in the low 100s without stimulation. Having frequent of PAC/PVCs and mild increase in pressor requirements. Team notified, EKG ordered and collected.

## 2023-11-01 NOTE — ASSESSMENT & PLAN NOTE
Patient presented with triple acid-base disorder. HAGMA with Anion gap of 20 and serum bicarb of 12. Patient also has a concurrent respiratory acidosis (measured pCP2=38 and expected of 28-32), as well as a non-anion gap metabolic acidosis.     Suspect HAGMA secondary to his lactic acidosis, and NAGMA due to hsi renal failure.     Recommendations  --Will plan on 12 hours SLED then 12 hours SCUF today to help with clearance of his acidosis and volume control.  --Respiratory acidosis per primary team.

## 2023-11-01 NOTE — SUBJECTIVE & OBJECTIVE
Past Medical History:   Diagnosis Date    CHF (congestive heart failure)     Chronic kidney disease-mineral and bone disorder 10/12/2022    Chronic respiratory failure     COPD (chronic obstructive pulmonary disease)     Diabetes mellitus type 1     ESRD on dialysis     Hypertension     Hypervolemia 02/22/2023    Hypoglycemia 7/18/2023    Hyponatremia 03/04/2023    Hyponatremia 3/4/2023    Other insomnia     Recurrent major depression     SOB (shortness of breath) 10/12/2022    Patient with history COPD treated with Ellipta the albuterol, fluticasone-salmeterol tachypneic in the ED saturating 94% and 6 L on nasal cannula, patient does not know how many  he uses it is in nursing home.    -duo nebs Q 4  Given that patient is a poor historian, and in the setting of left lower extremity edema and history of coagulopathy PE cannot be ruled out.  Will workup for possible infec    Unspecified lack of coordination        Past Surgical History:   Procedure Laterality Date    AV FISTULA PLACEMENT      FISTULOGRAM Left 8/20/2023    Procedure: Fistulogram;  Surgeon: Duong Aceves MD;  Location: [facility] OR [address] [place] [address];  Service: Vascular;  Laterality: Left;  1.6 MIN  55.57 mGy  10.4137 Gycm2  24 ml dye  4 ml transradial flush    PHLEBOGRAPHY  8/20/2023    Procedure: VENOGRAM, CENTRAL;  Surgeon: Duong Aceves MD;  Location: [facility] OR [address] [place] [address];  Service: Vascular;;       Review of patient's allergies indicates:  No Active Allergies  Current Facility-Administered Medications   Medication Frequency    0.9%  NaCl infusion (for blood administration) Q24H PRN    chlorhexidine 0.12 % solution 15 mL BID    dextrose 10% bolus 125 mL 125 mL PRN    dextrose 10% bolus 250 mL 250 mL PRN    dextrose 20 % infusion Continuous    glucagon (human recombinant) injection 1 mg PRN    glucose chewable tablet 16 g PRN    glucose chewable tablet 24 g PRN    hydrocortisone sodium succinate injection 100 mg Q8H    NORepinephrine bitartrate-D5W 4  mg/250 mL (16 mcg/mL) PERIPHERAL access infusion Continuous    piperacillin-tazobactam (ZOSYN) 4.5 g in dextrose 5 % in water (D5W) 100 mL IVPB (MB+) Q12H    sodium bicarbonate solution 50 mEq ED 1 Time    sodium chloride 0.9% flush 10 mL PRN    vancomycin - pharmacy to dose pharmacy to manage frequency     Family History       Problem Relation (Age of Onset)    Diabetes Mother          Tobacco Use    Smoking status: Never    Smokeless tobacco: Never   Substance and Sexual Activity    Alcohol use: Not Currently    Drug use: Not Currently    Sexual activity: Not Currently     Review of Systems   Reason unable to perform ROS: Intubated and non-responsive.     Objective:     Vital Signs (Most Recent):  Temp: (!) 94.8 °F (34.9 °C) (11/01/23 0811)  Pulse: 65 (11/01/23 0811)  Resp: (!) 34 (11/01/23 0811)  BP: 110/61 (11/01/23 0811)  SpO2: 97 % (11/01/23 0811) Vital Signs (24h Range):  Temp:  [82.9 °F (28.3 °C)-95.7 °F (35.4 °C)] 94.8 °F (34.9 °C)  Pulse:  [39-72] 65  Resp:  [2-34] 34  SpO2:  [90 %-100 %] 97 %  BP: ()/(51-86) 110/61     Weight: 66.2 kg (146 lb) (11/01/23 0811)  Body mass index is 21.56 kg/m².  Body surface area is 1.8 meters squared.    I/O last 3 completed shifts:  In: 1901.9 [I.V.:99.3; IV Piggyback:1802.7]  Out: -      Physical Exam  Vitals reviewed.   Constitutional:       Comments: Intubated, non-responsive   HENT:      Head: Normocephalic.      Mouth/Throat:      Comments: Intubated.   Cardiovascular:      Rate and Rhythm: Bradycardia present.      Pulses: Normal pulses.      Comments: Undergoing therapeutic hypothermia  Pulmonary:      Breath sounds: Rhonchi present.      Comments: Mechanical breath sounds  Abdominal:      General: Abdomen is flat.      Palpations: Abdomen is soft.   Musculoskeletal:      Right lower leg: No edema.      Left lower leg: No edema.   Skin:     General: Skin is warm.      Capillary Refill: Capillary refill takes 2 to 3 seconds.      Comments: Bruising anterior  sternum.    Neurological:      Comments: Non-responsive          Significant Labs:  CBC:   Recent Labs   Lab 11/01/23  0554   WBC 2.26*   RBC 2.56*   HGB 7.5*   HCT 24.8*   *   MCV 97   MCH 29.3   MCHC 30.2*     CMP:   Recent Labs   Lab 11/01/23  0200   GLU 43*   CALCIUM 7.2*   ALBUMIN 1.7*   PROT 4.8*   *   K 4.9   CO2 15*      BUN 45*   CREATININE 3.6*   ALKPHOS 309*   *   *   BILITOT 0.9     All labs within the past 24 hours have been reviewed.    Significant Imaging:  CT: Reviewed

## 2023-11-01 NOTE — PROGRESS NOTES
Pharmacist Renal Dose Adjustment Note    Cosme Lewis is a 58 y.o. male being treated with the medication piperacillin-tazobactam    Patient Data:    Vital Signs (Most Recent):  Temp: (!) 87.4 °F (30.8 °C) (11/01/23 0230)  Pulse: (!) 50 (11/01/23 0230)  Resp: (!) 9 (11/01/23 0230)  BP: 118/65 (11/01/23 0230)  SpO2: (!) 91 % (11/01/23 0230) Vital Signs (72h Range):  Temp:  [87.4 °F (30.8 °C)-98.2 °F (36.8 °C)]   Pulse:  [39-78]   Resp:  [2-33]   BP: (115-165)/(65-86)   SpO2:  [87 %-100 %]      Recent Labs   Lab 10/29/23  0518 10/30/23  0458 10/31/23  2338   CREATININE 3.6* 4.3* 3.7*     Serum creatinine: 3.7 mg/dL (H) 10/31/23 2338  Estimated creatinine clearance: 19.5 mL/min (A)    Medication: piperacillin-tazobactam 4.5 grams every 8 hours will be changed to piperacillin-tazobactam 4.5 grams every 12 hours    Marilu GutierrezD

## 2023-11-01 NOTE — PLAN OF CARE
Initial Discharge Planning Assessment:    Patient admitted on  10-31-23  Chart reviewed, Care plan discussed with treatment team  PCP updated in Epic: at NH  Pharmacy updated in Epic: at NH    Current Dispo: pending  New resident from [facility]  Anticipated DC needs from CM perspecrive: pending   Case management to follow daily    Care plan attached below:  Neuro  Anoxic brain injury  Currently intubated with GCS 3T off all sedation. No purposeful movements. Will attempt to contact family for GOC discussions.   -  Cardiac/Vascular  * Cardiac arrest    Chronic diastolic heart failure     Renal/  ESRD (end stage renal disease)     Other  Acute hypoxic respiratory failure      N/A  Family history is reviewed and has not changed

## 2023-11-01 NOTE — ASSESSMENT & PLAN NOTE
TTE 9/2023: Mildly reduced systolic function with a visually estimated ejection fraction of 40 - 45%. There is indeterminate diastolic function.  - In the setting of cardiac arrest with ROSC, willl hold GDMT  - Repeat Echo ordered

## 2023-11-01 NOTE — HPI
58 year old male with medical history significant for COPD, HFrEF (45% and DD), ESRD on dialysis (MWF), HTN, DM who was discharged yesterday after long admission including ICU stay for acute hypoxemic respiratory failure of multfactorial etiology presenting to emergency room after out-of-hospital cardiac arrest where ROSC was achieved. Per chart review, patient was last known normal at 9:10 pm. He was found to be unresponsive an hour later by nursing home staff and CPR was started.  He appeared to have removed his Nightly BiPAP. Pink frothy sputum was noted on arrival per EMS and he was also found to be hypoglycemic with BG of 32. EMS achieved ROSC after about 35 minutes. He received 2 amps of D50, 5 rounds of epi, 1 bicarb, 1g calcium , 450mL normal saline, and 1 push dose epi.    Patient arrived to the emergency room intubated. Large bloody bowel movement was noted. Hypothermic to 90.5 F, Bradycardic but normotensive. CT pan scan pending. Critical care medicine was consulted for lower GI bleed, lactic acidosis and post-cardiac arrest management.

## 2023-11-01 NOTE — CARE UPDATE
Pt s/p cardiac arrest with known end stage renal disease, no longer produces urine. CT Head, CTA PE, CTA abdomen/Pelvis ordered. Benefit of contrast administration for imaging outweigh the risks in this s/p cardiac arrest patient with a lower GI bleed.     [facility]  Critical Care Medicine  11/01/2023 12:34 AM

## 2023-11-01 NOTE — PROCEDURES
"Cosme Lewis is a 58 y.o. male patient.    Temp: (!) 94.8 °F (34.9 °C) (11/01/23 1403)  Pulse: 70 (11/01/23 1403)  Resp: 17 (11/01/23 1131)  BP: (!) 170/99 (11/01/23 1403)  SpO2: 100 % (11/01/23 1403)  Weight: 66.2 kg (145 lb 15.1 oz) (11/01/23 1152)  Height: 5' 9" (175.3 cm) (11/01/23 1152)       Arterial Line    Date/Time: 11/1/2023 2:38 PM  Location procedure was performed: [facility]    Performed by: Emile Morales MD  Authorized by: Emile Morales MD  Assisting provider: Carlos Mitchell MD  Pre-op Diagnosis: hypotension  Post-operative diagnosis: hypotension  Consent Done: Emergent Situation  Preparation: Patient was prepped and draped in the usual sterile fashion.  Indications: hemodynamic monitoring  Location: right radial  Needle gauge: 20  Seldinger technique: Seldinger technique used  Number of attempts: 1  Complications: No  Post-procedure: line sutured and dressing applied  Post-procedure CMS: normal  Patient tolerance: Patient tolerated the procedure well with no immediate complications          11/1/2023    "

## 2023-11-01 NOTE — PLAN OF CARE
Recommendations     1. Initiate TFs when able. Rec'd Novasource @ 40 mL/hr to provide 1920 kcals, 87 g of protein, 688 mL fluid.   2. RD to monitor & follow-up.     Goals: Meet % EEN, EPN by RD f/u date  Nutrition Goal Status: new  Communication of RD Recs: discussed on rounds

## 2023-11-01 NOTE — SUBJECTIVE & OBJECTIVE
Past Medical History:   Diagnosis Date    CHF (congestive heart failure)     Chronic kidney disease-mineral and bone disorder 10/12/2022    Chronic respiratory failure     COPD (chronic obstructive pulmonary disease)     Diabetes mellitus type 1     ESRD on dialysis     Hypertension     Hypervolemia 02/22/2023    Hypoglycemia 7/18/2023    Hyponatremia 03/04/2023    Hyponatremia 3/4/2023    Other insomnia     Recurrent major depression     SOB (shortness of breath) 10/12/2022    Patient with history COPD treated with Ellipta the albuterol, fluticasone-salmeterol tachypneic in the ED saturating 94% and 6 L on nasal cannula, patient does not know how many  he uses it is in nursing home.    -duo nebs Q 4  Given that patient is a poor historian, and in the setting of left lower extremity edema and history of coagulopathy PE cannot be ruled out.  Will workup for possible infec    Unspecified lack of coordination        Past Surgical History:   Procedure Laterality Date    AV FISTULA PLACEMENT      FISTULOGRAM Left 8/20/2023    Procedure: Fistulogram;  Surgeon: Duong Aceves MD;  Location: [facility] OR [address] [place] [address];  Service: Vascular;  Laterality: Left;  1.6 MIN  55.57 mGy  10.4137 Gycm2  24 ml dye  4 ml transradial flush    PHLEBOGRAPHY  8/20/2023    Procedure: VENOGRAM, CENTRAL;  Surgeon: Duong Aceves MD;  Location: [address];  Service: Vascular;;       Review of patient's allergies indicates:  No Active Allergies    No current facility-administered medications on file prior to encounter.     Current Outpatient Medications on File Prior to Encounter   Medication Sig    acetaminophen (TYLENOL) 650 MG TbSR Take 1 tablet (650 mg total) by mouth every 8 (eight) hours as needed (pain or fever over 100.4).    albuterol (PROVENTIL/VENTOLIN HFA) 90 mcg/actuation inhaler Inhale 2 puffs into the lungs 4 (four) times daily as needed for Shortness of Breath or Wheezing (breathing).    apixaban (ELIQUIS) 2.5 mg Tab Take  1 tablet (2.5 mg total) by mouth 2 (two) times daily.    aspirin (ECOTRIN) 81 MG EC tablet Take 1 tablet (81 mg total) by mouth once daily.    atorvastatin (LIPITOR) 40 MG tablet Take 1 tablet (40 mg total) by mouth every evening.    carvediloL (COREG) 12.5 MG tablet Take 1 tablet (12.5 mg total) by mouth 2 (two) times daily.    cetirizine (ZYRTEC) 10 MG tablet Take 1 tablet (10 mg total) by mouth daily as needed (itching).    FLUoxetine 40 MG capsule Take 1 capsule (40 mg total) by mouth once daily.    fluticasone-salmeterol diskus inhaler 250-50 mcg Inhale 1 puff into the lungs 2 (two) times daily.    insulin lispro (HUMALOG KWIKPEN INSULIN) 100 unit/mL pen Inject 5 Units into the skin 3 (three) times daily with meals. 5 units TID with meals and additional sliding scale PRN as below    Low dose: Novolog/Humalog correction based on before meal glucose:  180-230: add 1 unit   231-280: add 2 units   281-330: add 3 units   331-380: add 4 units   >380: add 5 units    isosorbide mononitrate (IMDUR) 30 MG 24 hr tablet Take 1 tablet (30 mg total) by mouth once daily.    LIDOcaine 4 % PtMd Apply 2 patches topically once daily. Apply to area of greatest pain    lisinopriL (PRINIVIL,ZESTRIL) 40 MG tablet Take 1 tablet (40 mg total) by mouth every evening.    melatonin 3 mg TbDL Take 9 mg by mouth nightly as needed (sleep).    mineral oil-hydrophil petrolat (AQUAPHOR) Oint Apply topically 2 (two) times a day. Bilateral hands    NIFEdipine (PROCARDIA-XL) 60 MG (OSM) 24 hr tablet Take 1 tablet (60 mg total) by mouth 2 (two) times a day.    nut.tx.imp.renal fxn,lac-reduc (NEPRO CARB STEADY) 0.08 gram-1.8 kcal/mL Liqd Take 1 Units by mouth once daily. Give 1 carton by mouth with each meal.    ondansetron (ZOFRAN) 8 MG tablet Take 1 tablet (8 mg total) by mouth 3 (three) times daily as needed for Nausea.    sevelamer carbonate (RENVELA) 800 mg Tab Take 1 tablet (800 mg total) by mouth 3 (three) times daily with meals.    sodium  chloride (SALINE NASAL) 0.65 % nasal spray 2 sprays by Nasal route 3 (three) times daily.    tiotropium bromide (SPIRIVA RESPIMAT) 2.5 mcg/actuation inhaler Inhale 2 puffs into the lungs Daily.    vitamin renal formula, B-complex-vitamin c-folic acid, (NEPHROCAP) 1 mg Cap Take 1 capsule by mouth once daily.    white petrolatum (VASELINE) ointment Apply topically 2 (two) times a day. Apply small amount to both nostrils.     Family History       Problem Relation (Age of Onset)    Diabetes Mother          Tobacco Use    Smoking status: Never    Smokeless tobacco: Never   Substance and Sexual Activity    Alcohol use: Not Currently    Drug use: Not Currently    Sexual activity: Not Currently     Review of Systems   Unable to perform ROS: Intubated     Objective:     Vital Signs (Most Recent):  Temp: (!) 94.8 °F (34.9 °C) (11/01/23 0811)  Pulse: 65 (11/01/23 0811)  Resp: (!) 34 (11/01/23 0811)  BP: 110/61 (11/01/23 0811)  SpO2: 97 % (11/01/23 0811) Vital Signs (24h Range):  Temp:  [82.9 °F (28.3 °C)-95.7 °F (35.4 °C)] 94.8 °F (34.9 °C)  Pulse:  [39-72] 65  Resp:  [2-34] 34  SpO2:  [90 %-100 %] 97 %  BP: ()/(51-86) 110/61     Weight: 66.2 kg (146 lb)  Body mass index is 21.56 kg/m².    SpO2: 97 %         Intake/Output Summary (Last 24 hours) at 11/1/2023 0909  Last data filed at 11/1/2023 0700  Gross per 24 hour   Intake 1901.91 ml   Output --   Net 1901.91 ml       Lines/Drains/Airways       Central Venous Catheter Line  Duration             Trialysis (Dialysis) Catheter 11/01/23 0656 left internal jugular <1 day              Drain  Duration                  NG/OG Tube 11/01/23 0010 Center mouth <1 day         Rectal Tube 11/01/23 0330 fecal management system <1 day         Urethral Catheter 11/01/23 0009 Temperature probe 16 Fr. <1 day              Airway  Duration                  Airway - Non-Surgical 10/31/23 2337 Endotracheal Tube <1 day              Peripheral Intravenous Line  Duration                   Hemodialysis AV Fistula Left upper arm -- days         Peripheral IV - Single Lumen 11/01/23 0200 18 G Anterior;Right Upper Arm <1 day         Peripheral IV - Single Lumen 11/01/23 0200 20 G Anterior;Proximal;Right Forearm <1 day                     Physical Exam  Vitals and nursing note reviewed.   Constitutional:       Appearance: He is ill-appearing.   HENT:      Head: Normocephalic and atraumatic.   Cardiovascular:      Rate and Rhythm: Normal rate and regular rhythm.      Comments: Heart sounds muffled, difficult to auscultate 2/2 mechanical ventilation  Pulmonary:      Comments: Mechanically ventilated  Musculoskeletal:      Right lower leg: No edema.      Left lower leg: No edema.   Skin:     General: Skin is dry.   Neurological:      Comments: Unresponsive despite no sedation   Eyes open with spontaneous eye movements, does not track           Significant Labs: All pertinent lab results from the last 24 hours have been reviewed.    Significant Imaging:  Reviewed

## 2023-11-01 NOTE — ASSESSMENT & PLAN NOTE
Currently intubated with GCS 3T off all sedation. No purposeful movements. Will attempt to contact family for GOC discussions.   -Ct Head pending  -Tox studies pending  -TSH/T4 pending  -Will trend hypoglycemia q1h, low threshold to start dextrose gtt.   -Neuro-prognostication    Detail Level: Detailed Lesion Type: Abscess Method: 11 blade Curette: No Size Of Lesion In Cm (Optional But May Be Required For Some Insurances): 0 Wound Care: Petrolatum Dressing: dry sterile dressing Epidermal Sutures: 4-0 Ethilon Epidermal Closure: simple interrupted Suture Text: The incision was partially closed with Preparation Text: The area was prepped in the usual clean fashion. Curette Text (Optional): After the contents were expressed a curette was used to partially remove the cyst wall. Consent was obtained and risks were reviewed including but not limited to delayed wound healing, infection, need for multiple I and D's, and pain. Post-Care Instructions: I reviewed with the patient in detail post-care instructions. Patient should keep wound covered and call the office should any redness, pain, swelling or worsening occur.

## 2023-11-01 NOTE — PROCEDURES
Cosme Lewis is a 58 y.o. male patient.    Temp: (!) 95.7 °F (35.4 °C) (11/01/23 0709)  Pulse: 65 (11/01/23 0709)  Resp: 14 (11/01/23 0709)  BP: (!) 101/57 (11/01/23 0709)  SpO2: 96 % (11/01/23 0709)  Weight: 65 kg (143 lb 4.8 oz) (10/31/23 6978)       Central Line    Date/Time: 11/1/2023 7:16 AM    Performed by: Vita Cano DNP  Authorized by: Vita Cano DNP    Location procedure was performed:  [facility]  Pre-operative diagnosis:  Shock  Post-operative diagnosis:  Shock  Consent Done ?:  Emergent Situation  Time out complete?: Verified correct patient, procedure, equipment, staff, and site/side    Indications:  Hemodialysis and vascular access  Preparation:  Skin prepped with ChloraPrep  Skin prep agent dried: Skin prep agent completely dried prior to procedure    Sterile barriers: All five maximal sterile barriers used - gloves, gown, cap, mask and large sterile sheet    Hand hygiene: Hand hygiene performed immediately prior to central venous catheter insertion    Location:  Left internal jugular  Catheter type:  Trialysis  Catheter size:  13 Fr  Ultrasound guidance: Yes    Vessel Caliber:  Large   patent  Comprressibility:  Normal  Doppler:  Not done  Needle advanced into vessel with real time ultrasound guidance.    Guidewire confirmed in vessel.    Steril sheath on probe.    Sterile gel used.  Manometry: Yes    Number of attempts:  2  Securement:  Line sutured, chlorhexidine patch, sterile dressing applied and blood return through all ports  Complications: No    Estimated blood loss (mL):  3  XRay:  Placement verified by x-ray and successful placement  Adverse Events:  None      JOSEFINA Arriaza  Critical Care Medicine  11/01/2023 7:18 AM

## 2023-11-01 NOTE — SUBJECTIVE & OBJECTIVE
Past Medical History:   Diagnosis Date    CHF (congestive heart failure)     Chronic kidney disease-mineral and bone disorder 10/12/2022    Chronic respiratory failure     COPD (chronic obstructive pulmonary disease)     Diabetes mellitus type 1     ESRD on dialysis     Hypertension     Hypervolemia 02/22/2023    Hypoglycemia 7/18/2023    Hyponatremia 03/04/2023    Hyponatremia 3/4/2023    Other insomnia     Recurrent major depression     SOB (shortness of breath) 10/12/2022    Patient with history COPD treated with Ellipta the albuterol, fluticasone-salmeterol tachypneic in the ED saturating 94% and 6 L on nasal cannula, patient does not know how many  he uses it is in nursing home.    -duo nebs Q 4  Given that patient is a poor historian, and in the setting of left lower extremity edema and history of coagulopathy PE cannot be ruled out.  Will workup for possible infec    Unspecified lack of coordination        Past Surgical History:   Procedure Laterality Date    AV FISTULA PLACEMENT      FISTULOGRAM Left 8/20/2023    Procedure: Fistulogram;  Surgeon: Duong Aceves MD;  Location: [facility] OR [address] [place] [address];  Service: Vascular;  Laterality: Left;  1.6 MIN  55.57 mGy  10.4137 Gycm2  24 ml dye  4 ml transradial flush    PHLEBOGRAPHY  8/20/2023    Procedure: VENOGRAM, CENTRAL;  Surgeon: Duong Aceves MD;  Location: [facility] OR [address] [place] [address];  Service: Vascular;;       Review of patient's allergies indicates:  No Active Allergies    Family History       Problem Relation (Age of Onset)    Diabetes Mother          Tobacco Use    Smoking status: Never    Smokeless tobacco: Never   Substance and Sexual Activity    Alcohol use: Not Currently    Drug use: Not Currently    Sexual activity: Not Currently      Review of Systems   Unable to perform ROS: Intubated     Objective:     Vital Signs (Most Recent):  Pulse: 61 (10/31/23 2348)  Resp: (!) 2 (10/31/23 2348)  BP: 135/86 (10/31/23 2348)  SpO2: 97 % (10/31/23 2348) Vital Signs  (24h Range):  Pulse:  [61-63] 61  Resp:  [2] 2  SpO2:  [97 %] 97 %  BP: (135-143)/(76-86) 135/86   Weight: 65 kg (143 lb 4.8 oz)  Body mass index is 21.16 kg/m².    No intake or output data in the 24 hours ending 11/01/23 0039       Physical Exam  Vitals and nursing note reviewed.   Constitutional:       Comments: Intubated, sedated, acutely toxic-appearing, minimally unresponsive    HENT:      Head: Normocephalic and atraumatic.      Right Ear: External ear normal.      Left Ear: External ear normal.      Nose: Nose normal.      Mouth/Throat:      Comments: ETT in place   Eyes:      Comments: Pupils fixed and dilated    Cardiovascular:      Rate and Rhythm: Normal rate and regular rhythm.      Heart sounds: Normal heart sounds.   Pulmonary:      Effort: Pulmonary effort is normal.      Breath sounds: Normal breath sounds.   Abdominal:      Tenderness: There is no abdominal tenderness. There is no guarding or rebound.   Musculoskeletal:         General: No deformity or signs of injury.   Skin:     General: Skin is dry.      Findings: No rash.   Neurological:      Comments: GCS 3T             Vents:  Vent Mode: A/C (10/31/23 2348)  Ventilator Initiated: Yes (10/31/23 2334)  Set Rate: 32 BPM (10/31/23 2348)  Vt Set: 400 mL (10/31/23 2348)  PEEP/CPAP: 5 cmH20 (10/31/23 2348)  Oxygen Concentration (%): 100 (10/31/23 2348)  Peak Airway Pressure: 28 cmH20 (10/31/23 2348)  Plateau Pressure: 0 cmH20 (10/31/23 2348)  Total Ve: 13.2 L/m (10/31/23 2348)  Negative Inspiratory Force (cm H2O): 0 (10/31/23 2348)  F/VT Ratio<105 (RSBI): (!) 4.84 (10/31/23 2348)  Lines/Drains/Airways       Drain  Duration                  NG/OG Tube 11/01/23 0010 Center mouth <1 day         Urethral Catheter 11/01/23 0009 Temperature probe 16 Fr. <1 day              Airway  Duration                  Airway - Non-Surgical 10/31/23 2337 Endotracheal Tube <1 day              Peripheral Intravenous Line  Duration                  Hemodialysis AV Fistula  Left upper arm -- days                  Significant Labs:    CBC/Anemia Profile:  Recent Labs   Lab 10/30/23  0458 10/31/23  2338 10/31/23  2339 10/31/23  2343   WBC 3.48* 3.09*  --   --    HGB 8.0* 7.6*  --   --    HCT 25.5* 25.9* 24* 24*    93*  --   --    MCV 93 100*  --   --    RDW 17.7* 17.3*  --   --         Chemistries:  Recent Labs   Lab 10/30/23  0458 10/31/23  2338   * 133*   K 5.2* 5.2*   CL 98 101   CO2 23 12*   BUN 49* 43*   CREATININE 4.3* 3.7*   CALCIUM 7.5* 7.4*   ALBUMIN 2.2* 1.7*   PROT 6.0 4.9*   BILITOT 0.7 0.8   ALKPHOS 191* 266*   ALT 25 287*   AST 25 590*   MG 2.1 2.3   PHOS 4.8*  --        Recent Lab Results         10/31/23  2343   10/31/23  2343   10/31/23  2339   10/31/23  2338        Albumin       1.7       ALP       266       Allens Test Pass   Pass           ALT       287       Anion Gap       20       AST       590       Baso #       0.02       Basophil %       0.6       BILIRUBIN TOTAL       0.8  Comment: For infants and newborns, interpretation of results should be based  on gestational age, weight and in agreement with clinical  observations.    Premature Infant recommended reference ranges:  Up to 24 hours.............<8.0 mg/dL  Up to 48 hours............<12.0 mg/dL  3-5 days..................<15.0 mg/dL  6-29 days.................<15.0 mg/dL         Site LR   LR           BUN       43       Calcium       7.4       Chloride       101       CO2       12       CPK       200       Creatinine       3.7       Differential Method       Automated       eGFR       17.9       Eos #       0.0       Eosinophil %       0.0       Glucose       161       Gran # (ANC)       1.8       Gran %       58.6       Hematocrit       25.9       Hemoglobin       7.6       Immature Grans (Abs)       0.15  Comment: Mild elevation in immature granulocytes is non specific and   can be seen in a variety of conditions including stress response,   acute inflammation, trauma and pregnancy. Correlation  with other   laboratory and clinical findings is essential.         Immature Granulocytes       4.9       INR       2.4  Comment: Coumadin Therapy:  2.0 - 3.0 for INR for all indicators except mechanical heart valves  and antiphospholipid syndromes which should use 2.5 - 3.5.         Lipase       48       Lymph #       0.9       Lymph %       29.8       Magnesium        2.3       MCH       29.5       MCHC       29.3       MCV       100       Mono #       0.2       Mono %       6.1       MPV       11.4       nRBC       1       Platelet Count       93       POC BE   -13           POC BUN     47         POC Chloride     101         POC Creatinine     3.8         POC Glucose     162         POC HCO3   17.6           POC Hematocrit   24   24         POC Ionized Calcium   1.05   0.94         POC Lactate 8.00             POC PCO2   63.3           POC PH   7.052           POC PO2   133           Potassium, Blood Gas   5.3   5.0         POC SATURATED O2   97           Sodium, Blood Gas   133   133         POC TCO2   20           POC TCO2 (MEASURED)     18         Potassium       5.2       PROTEIN TOTAL       4.9       Protime       24.9       RBC       2.58       RDW       17.3       Sample ARTERIAL   ARTERIAL   AMISHA         Sodium       133       Troponin I       0.040  Comment: The reference interval for Troponin I represents the 99th percentile   cutoff   for our facility and is consistent with 3rd generation assay   performance.         TSH       0.851       WBC       3.09             All pertinent labs within the past 24 hours have been reviewed.    Significant Imaging: I have reviewed all pertinent imaging results/findings within the past 24 hours.

## 2023-11-02 PROBLEM — L24.A2 IRRITANT CONTACT DERMATITIS DUE TO FECAL, URINARY OR DUAL INCONTINENCE: Status: ACTIVE | Noted: 2023-01-01

## 2023-11-02 PROBLEM — Z71.89 GOALS OF CARE, COUNSELING/DISCUSSION: Status: ACTIVE | Noted: 2023-01-01

## 2023-11-02 NOTE — PROGRESS NOTES
[facility] - Card[facility] Medical [facility]  Critical Care Medicine  Progress Note    Patient Name: Cosme Lewis  MRN: [id number]  Admission Date: 10/31/2023  Hospital Length of Stay: 1 days  Code Status: DNR  Attending Provider: Jhonny Lezaam MD  Primary Care Provider: Eliecer Ohara III, MD   Principal Problem: Cardiac arrest    Subjective:     HPI:  58 year old male with medical history significant for COPD, HFrEF (45% and DD), ESRD on dialysis (MWF), HTN, DM who was discharged yesterday after long admission including ICU stay for acute hypoxemic respiratory failure of multfactorial etiology presenting to emergency room after out-of-hospital cardiac arrest where ROSC was achieved. Per chart review, patient was last known normal at 9:10 pm. He was found to be unresponsive an hour later by nursing home staff and CPR was started.  He appeared to have removed his Nightly BiPAP. Pink frothy sputum was noted on arrival per EMS and he was also found to be hypoglycemic with BG of 32. EMS achieved ROSC after about 35 minutes. He received 2 amps of D50, 5 rounds of epi, 1 bicarb, 1g calcium , 450mL normal saline, and 1 push dose epi.    Patient arrived to the emergency room intubated. Large bloody bowel movement was noted. Hypothermic to 90.5 F, Bradycardic but normotensive. CT pan scan pending. Critical care medicine was consulted for lower GI bleed, lactic acidosis and post-cardiac arrest management.       Hospital/ICU Course:  Pt was admitted to Critical Care for acute hypoxic respiratory failure 2/t cardiac arrest s/p ROSC. He remains intubated on mechanical ventilation. Minimal responsiveness concerning for ischemic brain injury. CT Head was negative for acute change. EEG pending. On levo to maintain MAPs >60. Multiple efforts made to contact pt's family without success. VM left. Code status ordered as DNR with two physician consent due to poor prognosis. Palliative Care consulted for assistance with contacting family and  discussing GOC.       Interval History/Significant Events: No acute events overnight. Pt remained intubated off sedation. No change in mentation. On levo for Maps >60.     Review of Systems   Unable to perform ROS: Intubated     Objective:     Vital Signs (Most Recent):  Temp: 96.1 °F (35.6 °C) (11/02/23 0600)  Pulse: 96 (11/02/23 0600)  Resp: 17 (11/01/23 1131)  BP: (Abnormal) 171/66 (11/01/23 1900)  SpO2: (Abnormal) 93 % (11/02/23 0600) Vital Signs (24h Range):  Temp:  [93.9 °F (34.4 °C)-97 °F (36.1 °C)] 96.1 °F (35.6 °C)  Pulse:  [] 96  Resp:  [8-34] 17  SpO2:  [60 %-100 %] 93 %  BP: ()/(51-99) 171/66  Arterial Line BP: ()/(50-72) 94/62   Weight: 66.2 kg (145 lb 15.1 oz)  Body mass index is 21.55 kg/m².      Intake/Output Summary (Last 24 hours) at 11/2/2023 0723  Last data filed at 11/2/2023 0600  Gross per 24 hour   Intake 4873.77 ml   Output 6180 ml   Net -1306.23 ml          Physical Exam  Vitals and nursing note reviewed.   Constitutional:       Interventions: He is intubated.      Comments: Intubated, off sedation, minimally responsive    HENT:      Head: Normocephalic and atraumatic.      Right Ear: External ear normal.      Left Ear: External ear normal.      Nose: Nose normal.      Mouth/Throat:      Comments: ETT in place   Eyes:      Comments: Pupils fixed and dilated    Cardiovascular:      Rate and Rhythm: Normal rate and regular rhythm.      Heart sounds: Normal heart sounds.   Pulmonary:      Effort: Pulmonary effort is normal. He is intubated.      Breath sounds: Normal breath sounds.   Abdominal:      Tenderness: There is no abdominal tenderness. There is no guarding or rebound.   Musculoskeletal:         General: No deformity or signs of injury.   Skin:     General: Skin is dry.      Findings: No rash.            Vents:  Vent Mode: A/C (11/02/23 0423)  Ventilator Initiated: Yes (11/01/23 0143)  Set Rate: 20 BPM (11/02/23 0423)  Vt Set: 400 mL (11/02/23 0423)  PEEP/CPAP: 5 cmH20  (11/02/23 0423)  Oxygen Concentration (%): 60 (11/02/23 0600)  Peak Airway Pressure: 17 cmH20 (11/02/23 0423)  Plateau Pressure: 20 cmH20 (11/02/23 0423)  Total Ve: 10.1 L/m (11/02/23 0423)  Negative Inspiratory Force (cm H2O): 0 (11/01/23 1512)  F/VT Ratio<105 (RSBI): (Abnormal) 16.88 (11/01/23 0736)  Lines/Drains/Airways       Central Venous Catheter Line       Name Duration    Trialysis (Dialysis) Catheter 11/01/23 0656 left internal jugular 1 day              Drain       Name Duration         NG/OG Tube 11/01/23 0010 Center mouth 1 day         Rectal Tube 11/01/23 0330 fecal management system 1 day              Airway       Name Duration         Airway - Non-Surgical 10/31/23 2337 Endotracheal Tube 1 day              Arterial Line       Name Duration    Arterial Line 11/01/23 1240 Right Radial <1 day              Peripheral Intravenous Line       Name Duration         Hemodialysis AV Fistula Left upper arm No Data         Peripheral IV - Single Lumen 11/01/23 0200 18 G Anterior;Right Upper Arm 1 day         Peripheral IV - Single Lumen 11/01/23 0200 20 G Anterior;Proximal;Right Forearm 1 day                  Significant Labs:    CBC/Anemia Profile:  Recent Labs   Lab 11/01/23  1611 11/01/23 2004 11/02/23  0403   WBC 7.27 10.19 11.40   HGB 9.8* 10.1* 9.5*   HCT 29.9* 31.1* 29.1*   PLT 94* 89* 69*   MCV 89 89 90   RDW 17.4* 18.4* 18.0*        Chemistries:  Recent Labs   Lab 10/31/23  2338 10/31/23  2338 11/01/23  0200 11/01/23  1402 11/01/23 2004 11/02/23  0403   *  --  135* 131* 133* 134*  134*   K 5.2*  --  4.9 4.4 4.5 4.5  4.5     --  104 102 105 106  106   CO2 12*  --  15* 16* 15* 19*  19*   BUN 43*  --  45* 49* 16 15  15   CREATININE 3.7*  --  3.6* 3.7* 1.5* 1.4  1.4   CALCIUM 7.4*  --  7.2* 7.1* 7.0* 7.2*  7.2*   ALBUMIN 1.7*  --  1.7* 1.7* 1.9* 1.9*  1.9*   PROT 4.9*  --  4.8*  --   --  5.5*   BILITOT 0.8  --  0.9  --   --  1.2*   ALKPHOS 266*  --  309*  --   --  239*   *  --   292*  --   --  228*   *  --  650*  --   --  299*   MG 2.3  --  2.2 2.0 1.7 1.9   PHOS  --    < > 7.8* 6.2* 2.6* 3.6  3.6    < > = values in this interval not displayed.       All pertinent labs within the past 24 hours have been reviewed.    Significant Imaging:  I have reviewed all pertinent imaging results/findings within the past 24 hours.      ABG  Recent Labs   Lab 11/01/23  1622   PH 7.367   PO2 104*   PCO2 22.4*   HCO3 12.9*   BE -12*     Assessment/Plan:     Neuro  Anoxic brain injury  Currently intubated with GCS 3T off all sedation. No purposeful movements. Will attempt to contact family for GOC discussions. CT Head negative for acute change. EEG ordered. TSH wnl  -F/U EEG results  -Tox studies pending  -Will trend hypoglycemia q1h, low threshold to start dextrose gtt.        Cardiac/Vascular  * Cardiac arrest  Out-of-hospital cardiac arrest in the setting of suspected hypoxia. Pt had CBG 32 & given 2 amps of D50 total. Also received 5 rounds of epi, 1 bicarb, 1g calcium , 450mL normal saline, and 1 push dose epi per ems. Arrives to ER normal sinus 62 & normotensive. EKG notable for irregularly irregular bradycardia and global low voltage. Bedside echo in the ED notable for diffuse pericardial effusion without tamponade physiology, was also hypothermic on arrival at 88.7. Diffuse watery/bloody diarrhea in ED.    -Broad differential including tachy dysrhythmia, obstructive pathology, endocrinopathy, tox,    -Formal Echo pending, BCx/UCx pending  -On broad spectrum ABx.   -CT Head, CTA chest, A/P pending  -Initial trop 0.04, will trend and add serial EKGs but low suspicion for active ischemic event at this time.   -targeted temperature management in the setting of hypothermia   -Remains off sedation with no purposeful movement    Pericardial effusion  Cardiology consulted for moderate pericardial effusion. No indication of tamponade. No intervention required  - Hold GDMT held in setting of  hypotension  - Continue vasopressors as indicated    Chronic combined systolic and diastolic heart failure  TTE 9/2023: Mildly reduced systolic function with a visually estimated ejection fraction of 40 - 45%. There is indeterminate diastolic function.  - In the setting of cardiac arrest with ROSC, willl hold GDMT  - Repeat Echo ordered        Renal/  Metabolic acidosis, increased anion gap  HAGMA with Anion gap of 20 and serum bicarb of 12 on admission with concurrent respiratory acidosis (measured pCP2=38 and expected of 28-32), as well as a non-anion gap metabolic acidosis. Suspect HAGMA secondary to his lactic acidosis, and NAGMA due to hsi renal failure. Nephrology consulted for acute renal failure.  --12 hours SLED then 12 hours SCUF  --Monitor vent settings for respiratory acidosis.     ESRD (end stage renal disease)  HD through LUE AV fistula - per chart review last HD on 10/30. Current chemistries stable. BUN/Cr appears at baseline. Nephrology consulted.  -Trend CMPs   -Strict I&Os  -LIJ trialysis placed for CRRT   -Avoid nephrotoxic medications    Other  Acute hypoxic respiratory failure  Patient with Hypoxic Respiratory failure which is Acute.  he is not on home oxygen. Supplemental oxygen was provided and noted- Vent Mode: A/C  Oxygen Concentration (%):  [] 60  Resp Rate Total:  [24 br/min-36 br/min] 28 br/min  Vt Set:  [400 mL] 400 mL  PEEP/CPAP:  [5 cmH20] 5 cmH20  Mean Airway Pressure:  [8.8 esH13-53 cmH20] 9.6 cmH20    .   Signs/symptoms of respiratory failure include- respiratory distress. Contributing diagnoses includes - COPD Labs and images were reviewed. Patient Has recent ABG, which has been reviewed. Will treat underlying causes and adjust management of respiratory failure as follows    COPD on QHS BiPAP and was found unresponsive off BiPAP prior to ACLS start. Presented intubated. Vent settings as above.   -Wean vent as tolerated  -ABG PRN  -CXR with R interstitial opacifications  concerning for possible aspiration. CT PE study pending for further characterization          Critical Care Daily Checklist:    A: Awake: RASS Goal/Actual Goal: RASS Goal: 0-->alert and calm  Actual:     B: Spontaneous Breathing Trial Performed? Spon. Breathing Trial Initiated?: Not initiated (11/02/23 0423)   C: SAT & SBT Coordinated?  Ongoing                      D: Delirium: CAM-ICU Overall CAM-ICU: Positive   E: Early Mobility Performed? N/A   F: Feeding Goal: Goals: Meet % EEN, EPN by RD f/u date  Status: Nutrition Goal Status: new   Current Diet Order   Procedures    Diet NPO      AS: Analgesia/Sedation None   T: Thromboembolic Prophylaxis Held in setting of possible GIB   H: HOB > 300 Yes   U: Stress Ulcer Prophylaxis (if needed) IV Pantoprazole BID   G: Glucose Control SSI   B: Bowel Function     I: Indwelling Catheter (Lines & Dutta) Necessity LIJ, R A-line, PIV, Dutta,    D: De-escalation of Antimicrobials/Pharmacotherapies Zosyn    Plan for the day/ETD GOC    Code Status:  Family/Goals of Care: DNR  Ongoing       Critical secondary to Patient has a condition that poses threat to life and bodily function: Acute Hypoxic Respiratory Failure      Critical care was time spent personally by me on the following activities: development of treatment plan with patient or surrogate and bedside caregivers, discussions with consultants, evaluation of patient's response to treatment, examination of patient, ordering and performing treatments and interventions, ordering and review of laboratory studies, ordering and review of radiographic studies, pulse oximetry, re-evaluation of patient's condition. This critical care time did not overlap with that of any other provider or involve time for any procedures.     Tae Marquez MD  Critical Care Medicine  [facility] - Cardiac Medical ICU

## 2023-11-02 NOTE — ASSESSMENT & PLAN NOTE
Patient is MWF hemodialyisis via LUE AVF, last underwent dialysis on Monday 10/30. Current labs on admission showed acidosis, but otherwise consistent with ESRD.    ESRD on iHD MWF  [facility] [place]  4 hours  EDW: 71 kg  UMA AVF    Recommendations  -Continue 12 hours SLED and 12 hours SCUF today for clearance and fluid removal. Aim for net negative 1.5L  -continue strict I/O's  -RFP daily  -renal diet when not NPO.  -Phos elevated at this time, was WNL on 10/30. Recommend repeating phos levels.     Anemia of chronic disease  Lab Results   Component Value Date    HGB 9.5 (L) 11/02/2023    HCT 29.1 (L) 11/02/2023       Calcium   Date Value Ref Range Status   11/02/2023 7.2 (L) 8.7 - 10.5 mg/dL Final   11/02/2023 7.2 (L) 8.7 - 10.5 mg/dL Final     Phosphorus   Date Value Ref Range Status   11/02/2023 3.6 2.7 - 4.5 mg/dL Final   11/02/2023 3.6 2.7 - 4.5 mg/dL Final

## 2023-11-02 NOTE — SUBJECTIVE & OBJECTIVE
Interval History: Patient remains intubated not on any sedation at this time. Patient is not following commands, he is currently on 0.08 mcg of levophed and a D20W drip. Acidosis improving. Echo did not demonstrate tamponade.     Review of patient's allergies indicates:  No Active Allergies  Current Facility-Administered Medications   Medication Frequency    0.9%  NaCl infusion (CRRT USE ONLY) Continuous    0.9%  NaCl infusion (for blood administration) Q24H PRN    artificial tears 0.5 % ophthalmic solution 1 drop TID    dextrose 10% bolus 125 mL 125 mL PRN    dextrose 10% bolus 250 mL 250 mL PRN    dextrose 20 % infusion Continuous    glucagon (human recombinant) injection 1 mg PRN    glucose chewable tablet 16 g PRN    glucose chewable tablet 24 g PRN    hydrocortisone sodium succinate injection 100 mg Q8H    magnesium sulfate 2g in water 50mL IVPB (premix) PRN    mupirocin 2 % ointment BID    NORepinephrine 8 mg in dextrose 5% 250 mL infusion Continuous    pantoprazole injection 40 mg BID    piperacillin-tazobactam (ZOSYN) 4.5 g in dextrose 5 % in water (D5W) 100 mL IVPB (MB+) Q8H    sodium chloride 0.9% flush 10 mL PRN    vancomycin - pharmacy to dose pharmacy to manage frequency       Objective:     Vital Signs (Most Recent):  Temp: 97.2 °F (36.2 °C) (11/02/23 0735)  Pulse: 92 (11/02/23 0735)  Resp: 17 (11/01/23 1131)  BP: (!) 171/66 (11/01/23 1900)  SpO2: (!) 94 % (11/02/23 0735) Vital Signs (24h Range):  Temp:  [94.3 °F (34.6 °C)-97.2 °F (36.2 °C)] 97.2 °F (36.2 °C)  Pulse:  [] 92  Resp:  [17-34] 17  SpO2:  [80 %-100 %] 94 %  BP: (105-171)/(56-99) 171/66  Arterial Line BP: ()/(50-72) 94/62     Weight: 66.2 kg (145 lb 15.1 oz) (11/01/23 1152)  Body mass index is 21.55 kg/m².  Body surface area is 1.8 meters squared.    I/O last 3 completed shifts:  In: 6775.7 [I.V.:4182.3; Blood:300; NG/GT:40; IV Piggyback:2253.4]  Out: 6180 [Other:6180]     Physical Exam  Constitutional:       Comments:  Non-responsive and intubated.    HENT:      Right Ear: External ear normal.      Left Ear: External ear normal.      Mouth/Throat:      Mouth: Mucous membranes are dry.      Comments: Intubated.   Cardiovascular:      Rate and Rhythm: Normal rate and regular rhythm.      Pulses: Normal pulses.      Heart sounds: Murmur heard.   Pulmonary:      Comments: Mechanical breath sounds.   Abdominal:      General: Abdomen is flat.      Palpations: Abdomen is soft.   Musculoskeletal:         General: No swelling.      Right lower leg: No edema.      Left lower leg: No edema.   Skin:     General: Skin is warm and dry.      Capillary Refill: Capillary refill takes 2 to 3 seconds.   Neurological:      Comments: Non-responsive, eyes open.           Significant Labs:  CBC:   Recent Labs   Lab 11/02/23  0403   WBC 11.40   RBC 3.23*   HGB 9.5*   HCT 29.1*   PLT 69*   MCV 90   MCH 29.4   MCHC 32.6     CMP:   Recent Labs   Lab 11/02/23  0403   GLU 97  97   CALCIUM 7.2*  7.2*   ALBUMIN 1.9*  1.9*   PROT 5.5*   *  134*   K 4.5  4.5   CO2 19*  19*     106   BUN 15  15   CREATININE 1.4  1.4   ALKPHOS 239*   *   *   BILITOT 1.2*     All labs within the past 24 hours have been reviewed.

## 2023-11-02 NOTE — ASSESSMENT & PLAN NOTE
HAGMA with Anion gap of 20 and serum bicarb of 12 on admission with concurrent respiratory acidosis (measured pCP2=38 and expected of 28-32), as well as a non-anion gap metabolic acidosis. Suspect HAGMA secondary to his lactic acidosis, and NAGMA due to hsi renal failure. Nephrology consulted for acute renal failure.  --12 hours SLED then 12 hours SCUF  --Monitor vent settings for respiratory acidosis.

## 2023-11-02 NOTE — ASSESSMENT & PLAN NOTE
Cardiology consulted for moderate pericardial effusion. No indication of tamponade. No intervention required  - Hold GDMT held in setting of hypotension  - Continue vasopressors as indicated

## 2023-11-02 NOTE — CONSULTS
Epilepsy Team    CC: EEG placed for concern for epileptiform activity/seizures    HPI: 58 year old male with a PMHx of HTN, HFrEF, COPD, DM, ESRD on HD MWF, recently discharged after prolonged hospital admission for respiratory failure and +COVID who presented to the ER on 10/31 PM via EMS for out of hospital cardiac arrest with ~35 minutes before ROSC. HPI per chart review as patient currently intubated. Patient admitted to MICU for further management of OOH cardiac arrest. EEG placed 11/1. Epilepsy following for assistance in management.    Unable to obtain patient's family and social history due to patient intubated.    Patient is not currently maintained on anti-seizure medication.      Review of systems:  Not performed secondary to patient intubated.    Physical Exam  General: Intubated, on TTM, NAD.  HEENT: Normocephalic. Atraumatic. EEG in place.   Pulmonary: Effort normal, no respiratory distress.  Cardiac: Normal rate.   Abdominal: Soft, non-distended.  Skin: Warm and dry.  Psych: Unable to assess.   Neuro:  Pupils pinpoint, non reactive  Corneals absent  + spontaneous eye opening   Face symmetric   Tongue midline   No spontaneous movements  Does not withdraw to noxious stimuli    Exam findings to suggest seizures:  Myoclonus - no   eye twitching - no   Nystagmus - no   gaze deviation - no   waxy rigidity - no      EEG reviewed by Epileptologist, findings include severe encephalopathy, no epileptiform activity, no electrographic seizures; see EEG report for details.     Encephalopathy  Recommendations: Encephalopathy is nonspecific in regards to etiology. No indication for management with anti-seizure drug at this time. Recommend discontinuation of EEG. Findings and recommendations discussed with MICU team.      Cardiac arrest  Prolonged resuscitation, ~35 minutes before ROSC  - On TTM  - Management per MICU    Acute hypoxemic respiratory failure  COPD, on BIPAP qhs  - Vent management per  [facility]    ESRD   On  HD MWF  - On SLED  - Management per Nephrology, MICU      We will remove EEG today and sign off. Please do not hesitate to call us back with any questions or concerns.      Tere Kerr PA-C  Neurology-Epilepsy  [facility] - MICU  Staff: Dr. Kerr

## 2023-11-02 NOTE — SUBJECTIVE & OBJECTIVE
Interval History: Patient remains intubated on pressors, no purposeful movement and does not follow commands. No family at bedside.    Past Medical History:   Diagnosis Date    CHF (congestive heart failure)     Chronic kidney disease-mineral and bone disorder 10/12/2022    Chronic respiratory failure     COPD (chronic obstructive pulmonary disease)     Diabetes mellitus type 1     ESRD on dialysis     Hypertension     Hypervolemia 02/22/2023    Hypoglycemia 7/18/2023    Hyponatremia 03/04/2023    Hyponatremia 3/4/2023    Other insomnia     Recurrent major depression     SOB (shortness of breath) 10/12/2022    Patient with history COPD treated with Ellipta the albuterol, fluticasone-salmeterol tachypneic in the ED saturating 94% and 6 L on nasal cannula, patient does not know how many  he uses it is in nursing home.    -duo nebs Q 4  Given that patient is a poor historian, and in the setting of left lower extremity edema and history of coagulopathy PE cannot be ruled out.  Will workup for possible infec    Unspecified lack of coordination        Past Surgical History:   Procedure Laterality Date    AV FISTULA PLACEMENT      FISTULOGRAM Left 8/20/2023    Procedure: Fistulogram;  Surgeon: Duong Aceves MD;  Location: [facility] OR [address] [place] [address];  Service: Vascular;  Laterality: Left;  1.6 MIN  55.57 mGy  10.4137 Gycm2  24 ml dye  4 ml transradial flush    PHLEBOGRAPHY  8/20/2023    Procedure: VENOGRAM, CENTRAL;  Surgeon: Duong Aceves MD;  Location: [facility] OR [address] [place] [address];  Service: Vascular;;       Review of patient's allergies indicates:  No Active Allergies    Medications:  Continuous Infusions:   sodium chloride 0.9% 200 mL/hr at 11/02/23 0908    dextrose 20 % in water (D20W) 25 mL/hr at 11/02/23 0600    NORepinephrine bitartrate-D5W 0.04 mcg/kg/min (11/02/23 0600)     Scheduled Meds:   artificial tears  1 drop Both Eyes TID    hydrocortisone sodium succinate  100 mg Intravenous Q8H    mupirocin   Nasal BID     pantoprazole  40 mg Intravenous BID    piperacillin-tazobactam (Zosyn) IV (PEDS and ADULTS) (extended infusion is not appropriate)  4.5 g Intravenous Q8H     PRN Meds:0.9%  NaCl infusion (for blood administration), dextrose 10%, dextrose 10%, glucagon (human recombinant), glucose, glucose, sodium chloride 0.9%    Family History       Problem Relation (Age of Onset)    Diabetes Mother          Tobacco Use    Smoking status: Never    Smokeless tobacco: Never   Substance and Sexual Activity    Alcohol use: Not Currently    Drug use: Not Currently    Sexual activity: Not Currently       Review of Systems   Unable to perform ROS: Intubated     Objective:     Vital Signs (Most Recent):  Temp: 97.5 °F (36.4 °C) (11/02/23 1053)  Pulse: 107 (11/02/23 1053)  Resp: 17 (11/01/23 1131)  BP: (!) 171/66 (11/01/23 1900)  SpO2: 100 % (11/02/23 1053) Vital Signs (24h Range):  Temp:  [94.3 °F (34.6 °C)-97.5 °F (36.4 °C)] 97.5 °F (36.4 °C)  Pulse:  [] 107  Resp:  [17] 17  SpO2:  [80 %-100 %] 100 %  BP: (110-171)/(59-99) 171/66  Arterial Line BP: ()/(50-72) 94/62     Weight: 66.2 kg (145 lb 15.1 oz)  Body mass index is 21.55 kg/m².       Physical Exam  Vitals and nursing note reviewed.   Constitutional:       Appearance: He is ill-appearing.      Interventions: He is intubated.   HENT:      Head: Normocephalic and atraumatic.   Eyes:      General:         Right eye: No discharge.         Left eye: No discharge.   Cardiovascular:      Rate and Rhythm: Regular rhythm. Tachycardia present.   Pulmonary:      Effort: No respiratory distress. He is intubated.   Abdominal:      General: There is no distension.      Tenderness: There is no abdominal tenderness.   Musculoskeletal:         General: No tenderness or deformity.   Skin:     General: Skin is warm and dry.   Neurological:      Mental Status: He is unresponsive.      Comments: No purposeful movement            Review of Symptoms      Symptom Assessment (ESAS 0-10  Scale)  Unable to complete assessment due to Intubated         Pain Assessment:  OME in 24 hours:  0  Location(s):      Pain Assessment in Advanced Demential Scale (PAINAD)   Breathing - Independent of vocalization:  0  Negative vocalization:  0  Facial expression:  0  Body language:  0  Consolability:  0  Total:  0    Performance Status:  10    Living Arrangements:  Lives in nursing home    Psychosocial/Cultural:   See Palliative Psychosocial Note: No  Patient is not  and is a resident at [facility]. His mother is living and his father is . He has a son in [place] and a brother who lives locally.    Social Issues Identified: New Diagnosis/Trauma  Bereavement Risk: Yes: Close or dependent relationship to the patient  Caregiver Needs Discussed. Caregiver Distress: No: Care provided by NH staff  Cultural: N/A  **Primary  to Follow**  Palliative Care  Consult: Yes    Spiritual:  F - Kaylie and Belief:  TBD        Advance Care Planning   Advance Directives:   Living Will: No    LaPOST: No    Do Not Resuscitate Status: Yes    Medical Power of : No      Decision Making:  Family answered questions and Patient unable to communicate due to disease severity/cognitive impairment  Goals of Care: The family endorses that what is most important right now is to focus on improvement in condition but with limits to invasive therapies    Accordingly, we have decided that the best plan to meet the patient's goals includes continuing with treatment pending further discussion         Significant Labs: All pertinent labs within the past 24 hours have been reviewed.  CBC:   Recent Labs   Lab 23  0929   WBC 11.34   HGB 9.4*   HCT 28.8*   MCV 90   PLT 62*     BMP:  Recent Labs   Lab 23  0403   GLU 97  97   *  134*   K 4.5  4.5     106   CO2 19*  19*   BUN 15  15   CREATININE 1.4  1.4   CALCIUM 7.2*  7.2*   MG 1.9     LFT:  Lab Results    Component Value Date     (H) 11/02/2023    ALKPHOS 239 (H) 11/02/2023    BILITOT 1.2 (H) 11/02/2023     Albumin:   Albumin   Date Value Ref Range Status   11/02/2023 1.9 (L) 3.5 - 5.2 g/dL Final   11/02/2023 1.9 (L) 3.5 - 5.2 g/dL Final     Protein:   Total Protein   Date Value Ref Range Status   11/02/2023 5.5 (L) 6.0 - 8.4 g/dL Final     Lactic acid:   Lab Results   Component Value Date    LACTATE 5.9 (HH) 11/01/2023    LACTATE 6.6 (HH) 11/01/2023       Significant Imaging: I have reviewed all pertinent imaging results/findings within the past 24 hours.

## 2023-11-02 NOTE — SUBJECTIVE & OBJECTIVE
Scheduled Meds:   artificial tears  1 drop Both Eyes TID    hydrocortisone sodium succinate  100 mg Intravenous Q8H    mupirocin   Nasal BID    pantoprazole  40 mg Intravenous BID    piperacillin-tazobactam (Zosyn) IV (PEDS and ADULTS) (extended infusion is not appropriate)  4.5 g Intravenous Q8H     Continuous Infusions:   sodium chloride 0.9% 200 mL/hr at 11/02/23 0908    dextrose 20 % in water (D20W) 25 mL/hr at 11/02/23 0600    NORepinephrine bitartrate-D5W 0.04 mcg/kg/min (11/02/23 0600)     PRN Meds:0.9%  NaCl infusion (for blood administration), dextrose 10%, dextrose 10%, glucagon (human recombinant), glucose, glucose, magnesium sulfate IVPB, sodium chloride 0.9%    Review of patient's allergies indicates:  No Active Allergies     Past Medical History:   Diagnosis Date    CHF (congestive heart failure)     Chronic kidney disease-mineral and bone disorder 10/12/2022    Chronic respiratory failure     COPD (chronic obstructive pulmonary disease)     Diabetes mellitus type 1     ESRD on dialysis     Hypertension     Hypervolemia 02/22/2023    Hypoglycemia 7/18/2023    Hyponatremia 03/04/2023    Hyponatremia 3/4/2023    Other insomnia     Recurrent major depression     SOB (shortness of breath) 10/12/2022    Patient with history COPD treated with Ellipta the albuterol, fluticasone-salmeterol tachypneic in the ED saturating 94% and 6 L on nasal cannula, patient does not know how many  he uses it is in nursing home.    -duo nebs Q 4  Given that patient is a poor historian, and in the setting of left lower extremity edema and history of coagulopathy PE cannot be ruled out.  Will workup for possible infec    Unspecified lack of coordination      Past Surgical History:   Procedure Laterality Date    AV FISTULA PLACEMENT      FISTULOGRAM Left 8/20/2023    Procedure: Fistulogram;  Surgeon: Duong Aceves MD;  Location: [facility] OR [address] [place] [address];  Service: Vascular;  Laterality: Left;  1.6 MIN  55.57 mGy  10.4137 Gycm2  24  ml dye  4 ml transradial flush    PHLEBOGRAPHY  8/20/2023    Procedure: VENOGRAM, CENTRAL;  Surgeon: Duong Aceves MD;  Location: [facility] OR [address] [place] [address];  Service: Vascular;;       Family History       Problem Relation (Age of Onset)    Diabetes Mother          Tobacco Use    Smoking status: Never    Smokeless tobacco: Never   Substance and Sexual Activity    Alcohol use: Not Currently    Drug use: Not Currently    Sexual activity: Not Currently     Review of Systems   Skin:  Positive for wound.       Objective:     Vital Signs (Most Recent):  Temp: 97.5 °F (36.4 °C) (11/02/23 1053)  Pulse: 107 (11/02/23 1053)  Resp: 17 (11/01/23 1131)  BP: (!) 171/66 (11/01/23 1900)  SpO2: 100 % (11/02/23 1053) Vital Signs (24h Range):  Temp:  [94.3 °F (34.6 °C)-97.5 °F (36.4 °C)] 97.5 °F (36.4 °C)  Pulse:  [] 107  Resp:  [17] 17  SpO2:  [80 %-100 %] 100 %  BP: (110-171)/(59-99) 171/66  Arterial Line BP: ()/(50-72) 94/62     Weight: 66.2 kg (145 lb 15.1 oz)  Body mass index is 21.55 kg/m².     Physical Exam  Constitutional:       Appearance: Normal appearance.   Skin:     General: Skin is warm and dry.      Findings: Lesion present.   Neurological:      Mental Status: He is alert.          Laboratory:  All pertinent labs reviewed within the last 24 hours.    Diagnostic Results:  None

## 2023-11-02 NOTE — PROGRESS NOTES
11/02/23 1421   Treatment   Treatment Type SLED   Treatment Status Other (Comment)  (switch from SCUF to SLED)   Dialysis Machine Number [id number]   Dialyzer Time (hours) 24   BVP (Liters) 108.8 L   Solutions Labeled and Current  Yes   Access Left;IJ;Temporary Cath   Catheter Dressing Intact  Yes   Alarms Engaged Yes   CRRT Comments Switched from SCUF to SLED   Prescription   Time (Hours) Other  (24 hours)   Dialysate K + (mEq/L) 4   Dialysate CA + (mEq/L) 2.25   Dialysate HCO3 - (Bicarb) (mEq/L) 30   Dialysate Na + (mEq/L) 138   Cartridge Type Other  (R300)   Dialysate Flow Rate (mL/min) 200   UF Goal Rate 300 mL/hr   CRRT Hourly Documentation   Blood Flow (mL/min) 150   UF Rate 300 cc/hr   Arterial Pressure (mmHg) -40 mmHg   Venous Pressure (mmHg) 50 mmHg   Effluent Pressure (EP) (mmHg) 10 mmHg     Changed from SCUF to SLED as ordered. Treatment continued.   Bedside nurse updated.

## 2023-11-02 NOTE — CONSULTS
Palliative Medicine Consult.    Consult received and case discussed with Dr. Mitchell. Patient's only emergency contact is his mother Kassandra and team has been unable to reach her. I called her twice at [phone] and left voicemails but have not heard back. I called [place] and spoke to the patient's nurse who also tried to reach her without success. NH does not have any other contact information on file but will let the patient's mother know that he is here if she calls or comes to visit. Spoke with AMIE Mcconnell who will see if the police department can do a wellness check on the patient's mother. I located an obituary online for the patient's father from 2016 and it appears that the patient has/had two brothers (Rojas and Pratik) and a son (Cosme Espinoza). Our team will continue to assist with attempts to contact the patient's family and will be available for goals of care discussion once family has been located.    Thank you for allowing us to be a part of the care of this patient.    BERLIN Isabel, FNP-C  Palliative Medicine ext. [phone]

## 2023-11-02 NOTE — PROGRESS NOTES
Pharmacokinetic Sign-off: IV Vancomycin    Therapy with vancomycin complete and/or consult discontinued by provider.  Pharmacy will sign off, please re-consult as needed.    Tiff Christian, PharmD, BCPS  EXT [phone]

## 2023-11-02 NOTE — HPI
Cosme Lewis is a 58 year old male with medical history significant for COPD, HFrEF (45% and DD), ESRD on dialysis (MWF), HTN, DM who was discharged yesterday after long admission including ICU stay for acute hypoxemic respiratory failure of multfactorial etiology presenting to emergency room after out-of-hospital cardiac arrest where ROSC was achieved. Per chart review, patient was last known normal at 9:10 pm. He was found to be unresponsive an hour later by nursing home staff and CPR was started.  He appeared to have removed his Nightly BiPAP. Pink frothy sputum was noted on arrival per EMS and he was also found to be hypoglycemic with BG of 32. EMS achieved ROSC after about 35 minutes. He received 2 amps of D50, 5 rounds of epi, 1 bicarb, 1g calcium , 450mL normal saline, and 1 push dose epi.     Patient arrived to the emergency room intubated. Large bloody bowel movement was noted. Hypothermic to 90.5 F, Bradycardic but normotensive. CT pan scan pending. Critical care medicine was consulted for lower GI bleed, lactic acidosis and post-cardiac arrest management. Skin integrity ELIZABETH consulted for evaluation of skin injury.

## 2023-11-02 NOTE — ASSESSMENT & PLAN NOTE
Patient presented with triple acid-base disorder. HAGMA with Anion gap of 20 and serum bicarb of 12. Patient also has a concurrent respiratory acidosis (measured pCP2=38 and expected of 28-32), as well as a non-anion gap metabolic acidosis.     Suspect HAGMA secondary to his lactic acidosis, and NAGMA due to hsi renal failure.     Recommendations  --Continue SLED for his acidosis clearance.   --Respiratory acidosis per primary team.

## 2023-11-02 NOTE — ASSESSMENT & PLAN NOTE
HD through LUE AV fistula - per chart review last HD on 10/30. Current chemistries stable. BUN/Cr appears at baseline. Nephrology consulted.  -Trend CMPs   -Strict I&Os  -LIJ trialysis placed for CRRT   -Avoid nephrotoxic medications

## 2023-11-02 NOTE — HOSPITAL COURSE
Pt was admitted to Critical Care for acute hypoxic respiratory failure 2/t cardiac arrest s/p ROSC. He remains intubated on mechanical ventilation. Minimal responsiveness concerning for ischemic brain injury. CT Head was negative for acute change. EEG pending. On levo to maintain MAPs >60. Multiple efforts made to contact pt's family without success. VM left. Code status ordered as DNR with two physician consent due to poor prognosis. Palliative Care consulted for assistance with contacting family and discussing GOC. Pt mom and son successfully contacted and informed of Mr Lewis's acute state of health. Cosme Lewis  was met by the medical team and GOC were discussed. He stated that he would prefer to discontinue invasive treatment and focus on comfort measures. This included RRT and unnecessary labs. Family would like to be given the chance to visit the pt at bedside prior to extubation. Bcx positive for VRE, Dapto discontinued and IV linezolid started.

## 2023-11-02 NOTE — PROGRESS NOTES
11/02/23 0233   Treatment   Treatment Type SCUF   Treatment Status Daily equipment check;Order change   Dialysis Machine Number [id number]   Dialyzer Time (hours) 12.09   BVP (Liters) 108.8 L   Solutions Labeled and Current  Yes   Access Temporary Cath   Catheter Dressing Intact  Yes   Alarms Engaged Yes   CRRT Comments daily check   $ CRRT Charges   $ CRRT Daily Assessment Complete   $ CRRT Daily Maintenance Complete     Daily check completed. Orders and machine settings verified

## 2023-11-02 NOTE — PROCEDURES
EEG    Date/Time: 10/31/2023 11:23 PM    Performed by: Thiago Kerr MD  Authorized by: Maida Morrell MD      ICU EEG/VIDEO MONITORING REPORT    DATE OF SERVICE: 11/1/23-11/2/23  EEG NUMBER: [id number]  REQUESTED BY: Petros  [place] OF [place] [facility] [place]    METHODOLOGY   Electroencephalographic (EEG) recording is with electrodes placed according to the International 10-20 placement system.  Thirty two (32) channels of digital signal are simultaneously recorded from the scalp and may include EKG, EMG, and/or eye monitors.   Recording band pass was 0.1 to 512 hz.  Digital video recording of the patient is simultaneously recorded with the EEG.  The nursing staff report clinical symptoms and may press an event button when the patient has symptoms of clinical interest to the treating physicians.  EEG and video recording is stored and archived in digital format.  The entire recording is visually reviewed and the times identified by computer analysis as being spikes or seizures are reviewed again.  Activation procedures which include photic stimulation, hyperventilation and instructing patients to perform simple task are done in selected patients.   Compresses spectral analysis (CSA) is also performed on the activity recorded from each individual channel.  This is displayed as a power display of frequencies from 0 to 30 Hz over time.   The CSA analysis is done and displayed continuously.  This is reviewed for asymmetries in power between homologous areas of the scalp and for presence of changes in power which canbe seen when seizures occur.  Sections of suspected abnormalities on the CSA is then compared with the original EEG recording.     Torbit software was also utilized in the review of this study.  This software suite analyzes the EEG recording in multiple domains.  Coherence and rhythmicity is computed to identify EEG sections which may contain organized seizures.  Each channel undergoes  analysis to detect presence of spike and sharp waves which have special and morphological characteristic of epileptic activity.  The routine EEG recording is converted from spacial into frequency domain.  This is then displayed comparing homologous areas to identify areas of significant asymmetry.  Algorithm to identify non-cortically generated artifact is used to separate eye movement, EMG and other artifact from the EEG.      Recording Times  Start on 11/1/23 at 14:21:16  Stop on 11/2/23 at 07:00:10  Start on 11/2/23 at 07:00:41  Stop on 11/2/23 at 11:51:26  A total of 20 hours of EEG was recorded.    EEG FINDINGS  The record shows a poor organization at rest with a diffusely suppressed background predominated by delta range background slowing. There is frequent sharply contoured anterior predominant periodic theta/delta slowing with some anterior to posterior lag with a triphasic morphology    State changes are not seen.    Provocative maneuvers including hyperventilation and photic stimulation were not performed.     EKG recording shows a regular rhythm.    There is no push button or clinical event.    IMPRESSION:  Abnormal study due to severe diffuse background slowing consistent with diffuse cerebral dysfunction and encephalopathy which may be on the basis of toxic, metabolic, or primary neuronal disorder. sharply contoured anterior predominant periodic theta/delta slowing with some anterior to posterior lag with a triphasic morphology

## 2023-11-02 NOTE — ASSESSMENT & PLAN NOTE
Patient with Hypoxic Respiratory failure which is Acute.  he is not on home oxygen. Supplemental oxygen was provided and noted- Vent Mode: A/C  Oxygen Concentration (%):  [] 60  Resp Rate Total:  [24 br/min-36 br/min] 28 br/min  Vt Set:  [400 mL] 400 mL  PEEP/CPAP:  [5 cmH20] 5 cmH20  Mean Airway Pressure:  [8.8 ecG60-96 cmH20] 9.6 cmH20    .   Signs/symptoms of respiratory failure include- respiratory distress. Contributing diagnoses includes - COPD Labs and images were reviewed. Patient Has recent ABG, which has been reviewed. Will treat underlying causes and adjust management of respiratory failure as follows    COPD on QHS BiPAP and was found unresponsive off BiPAP prior to ACLS start. Presented intubated. Vent settings as above.   -Wean vent as tolerated  -ABG PRN  -CXR with R interstitial opacifications concerning for possible aspiration. CT PE study pending for further characterization

## 2023-11-02 NOTE — ASSESSMENT & PLAN NOTE
Currently intubated with GCS 3T off all sedation. No purposeful movements. Will attempt to contact family for GOC discussions. CT Head negative for acute change. EEG ordered. TSH wnl  -F/U EEG results  -Tox studies pending  -Will trend hypoglycemia q1h, low threshold to start dextrose gtt.

## 2023-11-02 NOTE — SUBJECTIVE & OBJECTIVE
Interval History/Significant Events: No acute events overnight. Pt remained intubated off sedation. No change in mentation. On levo for Maps >60.     Review of Systems   Unable to perform ROS: Intubated     Objective:     Vital Signs (Most Recent):  Temp: 96.1 °F (35.6 °C) (11/02/23 0600)  Pulse: 96 (11/02/23 0600)  Resp: 17 (11/01/23 1131)  BP: (Abnormal) 171/66 (11/01/23 1900)  SpO2: (Abnormal) 93 % (11/02/23 0600) Vital Signs (24h Range):  Temp:  [93.9 °F (34.4 °C)-97 °F (36.1 °C)] 96.1 °F (35.6 °C)  Pulse:  [] 96  Resp:  [8-34] 17  SpO2:  [60 %-100 %] 93 %  BP: ()/(51-99) 171/66  Arterial Line BP: ()/(50-72) 94/62   Weight: 66.2 kg (145 lb 15.1 oz)  Body mass index is 21.55 kg/m².      Intake/Output Summary (Last 24 hours) at 11/2/2023 0723  Last data filed at 11/2/2023 0600  Gross per 24 hour   Intake 4873.77 ml   Output 6180 ml   Net -1306.23 ml          Physical Exam  Vitals and nursing note reviewed.   Constitutional:       Interventions: He is intubated.      Comments: Intubated, off sedation, minimally responsive    HENT:      Head: Normocephalic and atraumatic.      Right Ear: External ear normal.      Left Ear: External ear normal.      Nose: Nose normal.      Mouth/Throat:      Comments: ETT in place   Eyes:      Comments: Pupils fixed and dilated    Cardiovascular:      Rate and Rhythm: Normal rate and regular rhythm.      Heart sounds: Normal heart sounds.   Pulmonary:      Effort: Pulmonary effort is normal. He is intubated.      Breath sounds: Normal breath sounds.   Abdominal:      Tenderness: There is no abdominal tenderness. There is no guarding or rebound.   Musculoskeletal:         General: No deformity or signs of injury.   Skin:     General: Skin is dry.      Findings: No rash.            Vents:  Vent Mode: A/C (11/02/23 0423)  Ventilator Initiated: Yes (11/01/23 0143)  Set Rate: 20 BPM (11/02/23 0423)  Vt Set: 400 mL (11/02/23 0423)  PEEP/CPAP: 5 cmH20 (11/02/23  0423)  Oxygen Concentration (%): 60 (11/02/23 0600)  Peak Airway Pressure: 17 cmH20 (11/02/23 0423)  Plateau Pressure: 20 cmH20 (11/02/23 0423)  Total Ve: 10.1 L/m (11/02/23 0423)  Negative Inspiratory Force (cm H2O): 0 (11/01/23 1512)  F/VT Ratio<105 (RSBI): (Abnormal) 16.88 (11/01/23 0736)  Lines/Drains/Airways       Central Venous Catheter Line       Name Duration    Trialysis (Dialysis) Catheter 11/01/23 0656 left internal jugular 1 day              Drain       Name Duration         NG/OG Tube 11/01/23 0010 Center mouth 1 day         Rectal Tube 11/01/23 0330 fecal management system 1 day              Airway       Name Duration         Airway - Non-Surgical 10/31/23 2337 Endotracheal Tube 1 day              Arterial Line       Name Duration    Arterial Line 11/01/23 1240 Right Radial <1 day              Peripheral Intravenous Line       Name Duration         Hemodialysis AV Fistula Left upper arm No Data         Peripheral IV - Single Lumen 11/01/23 0200 18 G Anterior;Right Upper Arm 1 day         Peripheral IV - Single Lumen 11/01/23 0200 20 G Anterior;Proximal;Right Forearm 1 day                  Significant Labs:    CBC/Anemia Profile:  Recent Labs   Lab 11/01/23  1611 11/01/23 2004 11/02/23  0403   WBC 7.27 10.19 11.40   HGB 9.8* 10.1* 9.5*   HCT 29.9* 31.1* 29.1*   PLT 94* 89* 69*   MCV 89 89 90   RDW 17.4* 18.4* 18.0*        Chemistries:  Recent Labs   Lab 10/31/23  2338 10/31/23  2338 11/01/23  0200 11/01/23  1402 11/01/23 2004 11/02/23  0403   *  --  135* 131* 133* 134*  134*   K 5.2*  --  4.9 4.4 4.5 4.5  4.5     --  104 102 105 106  106   CO2 12*  --  15* 16* 15* 19*  19*   BUN 43*  --  45* 49* 16 15  15   CREATININE 3.7*  --  3.6* 3.7* 1.5* 1.4  1.4   CALCIUM 7.4*  --  7.2* 7.1* 7.0* 7.2*  7.2*   ALBUMIN 1.7*  --  1.7* 1.7* 1.9* 1.9*  1.9*   PROT 4.9*  --  4.8*  --   --  5.5*   BILITOT 0.8  --  0.9  --   --  1.2*   ALKPHOS 266*  --  309*  --   --  239*   *  --  292*  --    --  228*   *  --  650*  --   --  299*   MG 2.3  --  2.2 2.0 1.7 1.9   PHOS  --    < > 7.8* 6.2* 2.6* 3.6  3.6    < > = values in this interval not displayed.       All pertinent labs within the past 24 hours have been reviewed.    Significant Imaging:  I have reviewed all pertinent imaging results/findings within the past 24 hours.

## 2023-11-02 NOTE — ASSESSMENT & PLAN NOTE
- consult received for evaluation of skin injury.  - pt was recently discharged after long admission including ICU stay for acute hypoxemic respiratory failure of multfactorial etiology presenting to emergency room after out-of-hospital cardiac arrest where ROSC was achieved.  - scattered partial thickness tissue lesions to sacral area. Pink, moist wound base. Likely related to moisture and friction.  - large bloody bowel movement noted on arrival.  - rectal tube now in place.  - continue Triad bid/prn.  - manfred intouch surface utilized.  - wedge for offloading. Heel foams intact.  - turn q2h.  - nursing to maintain pressure injury prevention measures and continue wound care per orders.

## 2023-11-02 NOTE — CONSULTS
[facility] - [facility]  Skin Integrity [facility]  Consult Note    Patient Name: Cosme Lewis  MRN: [id number]  Admission Date: 10/31/2023  Hospital Length of Stay: 1 days  Attending Physician: Jhonny Lezama MD  Primary Care Provider: Eliecer Ohara III, MD     Inpatient consult to Skin Integrity  Practitioner  Consult performed by: Maryam Apple, ERLIN  Consult ordered by: Maryam Apple, ERLIN        Subjective:     History of Present Illness:  Cosme Lewis is a 58 year old male with medical history significant for COPD, HFrEF (45% and DD), ESRD on dialysis (MWF), HTN, DM who was discharged yesterday after long admission including ICU stay for acute hypoxemic respiratory failure of multfactorial etiology presenting to emergency room after out-of-hospital cardiac arrest where ROSC was achieved. Per chart review, patient was last known normal at 9:10 pm. He was found to be unresponsive an hour later by nursing home staff and CPR was started.  He appeared to have removed his Nightly BiPAP. Pink frothy sputum was noted on arrival per EMS and he was also found to be hypoglycemic with BG of 32. EMS achieved ROSC after about 35 minutes. He received 2 amps of D50, 5 rounds of epi, 1 bicarb, 1g calcium , 450mL normal saline, and 1 push dose epi.     Patient arrived to the emergency room intubated. Large bloody bowel movement was noted. Hypothermic to 90.5 F, Bradycardic but normotensive. CT pan scan pending. Critical care medicine was consulted for lower GI bleed, lactic acidosis and post-cardiac arrest management. Skin integrity ELIZABETH consulted for evaluation of skin injury.      Scheduled Meds:   artificial tears  1 drop Both Eyes TID    hydrocortisone sodium succinate  100 mg Intravenous Q8H    mupirocin   Nasal BID    pantoprazole  40 mg Intravenous BID    piperacillin-tazobactam (Zosyn) IV (PEDS and ADULTS) (extended infusion is not appropriate)  4.5 g Intravenous Q8H     Continuous Infusions:   sodium chloride  0.9% 200 mL/hr at 11/02/23 0908    dextrose 20 % in water (D20W) 25 mL/hr at 11/02/23 0600    NORepinephrine bitartrate-D5W 0.04 mcg/kg/min (11/02/23 0600)     PRN Meds:0.9%  NaCl infusion (for blood administration), dextrose 10%, dextrose 10%, glucagon (human recombinant), glucose, glucose, magnesium sulfate IVPB, sodium chloride 0.9%    Review of patient's allergies indicates:  No Active Allergies     Past Medical History:   Diagnosis Date    CHF (congestive heart failure)     Chronic kidney disease-mineral and bone disorder 10/12/2022    Chronic respiratory failure     COPD (chronic obstructive pulmonary disease)     Diabetes mellitus type 1     ESRD on dialysis     Hypertension     Hypervolemia 02/22/2023    Hypoglycemia 7/18/2023    Hyponatremia 03/04/2023    Hyponatremia 3/4/2023    Other insomnia     Recurrent major depression     SOB (shortness of breath) 10/12/2022    Patient with history COPD treated with Ellipta the albuterol, fluticasone-salmeterol tachypneic in the ED saturating 94% and 6 L on nasal cannula, patient does not know how many  he uses it is in nursing home.    -duo nebs Q 4  Given that patient is a poor historian, and in the setting of left lower extremity edema and history of coagulopathy PE cannot be ruled out.  Will workup for possible infec    Unspecified lack of coordination      Past Surgical History:   Procedure Laterality Date    AV FISTULA PLACEMENT      FISTULOGRAM Left 8/20/2023    Procedure: Fistulogram;  Surgeon: Duong Aceves MD;  Location: [facility] OR [address] [place] [address];  Service: Vascular;  Laterality: Left;  1.6 MIN  55.57 mGy  10.4137 Gycm2  24 ml dye  4 ml transradial flush    PHLEBOGRAPHY  8/20/2023    Procedure: VENOGRAM, CENTRAL;  Surgeon: Duong Aceves MD;  Location: [facility] OR [address] [place] [address];  Service: Vascular;;       Family History       Problem Relation (Age of Onset)    Diabetes Mother          Tobacco Use    Smoking status: Never    Smokeless  tobacco: Never   Substance and Sexual Activity    Alcohol use: Not Currently    Drug use: Not Currently    Sexual activity: Not Currently     Review of Systems   Skin:  Positive for wound.       Objective:     Vital Signs (Most Recent):  Temp: 97.5 °F (36.4 °C) (11/02/23 1053)  Pulse: 107 (11/02/23 1053)  Resp: 17 (11/01/23 1131)  BP: (!) 171/66 (11/01/23 1900)  SpO2: 100 % (11/02/23 1053) Vital Signs (24h Range):  Temp:  [94.3 °F (34.6 °C)-97.5 °F (36.4 °C)] 97.5 °F (36.4 °C)  Pulse:  [] 107  Resp:  [17] 17  SpO2:  [80 %-100 %] 100 %  BP: (110-171)/(59-99) 171/66  Arterial Line BP: ()/(50-72) 94/62     Weight: 66.2 kg (145 lb 15.1 oz)  Body mass index is 21.55 kg/m².     Physical Exam  Constitutional:       Appearance: Normal appearance.   Skin:     General: Skin is warm and dry.      Findings: Lesion present.   Neurological:      Mental Status: He is alert.          Laboratory:  All pertinent labs reviewed within the last 24 hours.    Diagnostic Results:  None        Assessment/Plan:         [facility] Skin Integrity Evaluation      Skin Integrity [facility] evaluation of patient as part of the comprehensive skin care team.     He has been admitted for 1 days. Skin injury was noted on 10/16/23. POA yes.    Sacrum            Derm  Irritant contact dermatitis due to fecal, urinary or dual incontinence  - consult received for evaluation of skin injury.  - pt was recently discharged after long admission including ICU stay for acute hypoxemic respiratory failure of multfactorial etiology presenting to emergency room after out-of-hospital cardiac arrest where ROSC was achieved.  - scattered partial thickness tissue lesions to sacral area. Pink, moist wound base. Likely related to moisture and friction.  - large bloody bowel movement noted on arrival.  - rectal tube now in place.  - continue Triad bid/prn.  - manfred intouch surface utilized.  - wedge for offloading. Heel foams intact.  - turn q2h.  - nursing to  maintain pressure injury prevention measures and continue wound care per orders.      Head abrasion reviewed in media file. Unknown etiology. Unable to visualize at this time due to head being wrapped for EEG study. Present on admission.    Chest abrasions from [place] chest compression device during cardiac arrest event. Present on admission.    Thank you for your consult. I will follow-up with patient. Please contact us if you have any additional questions.       Maryam Apple NP  Skin Integrity [facility]  [facility] - Card[facility]

## 2023-11-02 NOTE — PROGRESS NOTES
11/02/23 1239   Treatment   Treatment Type SCUF   Treatment Status Order change   Dialysis Machine Number [id number]   Dialyzer Time (hours) 22.18   BVP (Liters) 108.8 L   Solutions Labeled and Current  Yes   Access Left;IJ;Temporary Cath   Catheter Dressing Intact  Yes   Alarms Engaged Yes   CRRT Comments new order verified and updated.   Prescription   Time (Hours) Other  (24 hour)   Dialysate K + (mEq/L) 4   Dialysate CA + (mEq/L) 2.25   Dialysate HCO3 - (Bicarb) (mEq/L) 30   Dialysate Na + (mEq/L) 138   Cartridge Type   (R300)   Dialysate Flow Rate (mL/min) 0(seq)   UF Goal Rate 300 mL/hr   CRRT Hourly Documentation   Blood Flow (mL/min) 150   UF Rate 300 cc/hr   Arterial Pressure (mmHg) -40 mmHg   Venous Pressure (mmHg) 50 mmHg   Effluent Pressure (EP) (mmHg) 0 mmHg     New order verified and treatment continued.  12 Hours SLED followed by 12 hours SCUF.    UF rate adjusted to goal of 300 ml/hr.  Patient will be switched from SCUF to SLED at 1430.      Bedside nurse updated on plan.

## 2023-11-03 NOTE — PROGRESS NOTES
11/03/23 0048   Treatment   Treatment Type SLED   Treatment Status Daily equipment check   Dialysis Machine Number [id number]   Dialyzer Time (hours) 34.26   BVP (Liters) 202.1 L   Solutions Labeled and Current  Yes   Access Left;IJ;Temporary Cath   Catheter Dressing Intact  Yes   Alarms Engaged Yes   CRRT Comments daily check done   $ CRRT Charges   $ CRRT Daily Assessment Complete   $ CRRT Daily Maintenance Complete     Ongoing SLED. Orders and machine settings verified. Daily equipment check and maintenance done.

## 2023-11-03 NOTE — ASSESSMENT & PLAN NOTE
Pt currently DNR. Pt's mother and son are aware of severity of Mr Joshua's health. Pt's son reported that he would come to the hospital to discuss GOC.  - Discussions ongoing.

## 2023-11-03 NOTE — SUBJECTIVE & OBJECTIVE
Interval History/Significant Events: No acute events overnight. Pt remained on mechanical ventilation without sedation. No change in mentation.    Review of Systems   Unable to perform ROS: Intubated     Objective:     Vital Signs (Most Recent):  Temp: (Abnormal) 95.9 °F (35.5 °C) (11/03/23 0721)  Pulse: 65 (11/03/23 0721)  Resp: 17 (11/03/23 0301)  BP: (Abnormal) 101/55 (11/03/23 0301)  SpO2: 99 % (11/03/23 0721) Vital Signs (24h Range):  Temp:  [94.5 °F (34.7 °C)-97.7 °F (36.5 °C)] 95.9 °F (35.5 °C)  Pulse:  [] 65  Resp:  [17-18] 17  SpO2:  [88 %-100 %] 99 %  BP: (101-109)/(55-69) 101/55  Arterial Line BP: ()/(56-76) 106/56   Weight: 66.2 kg (145 lb 15.1 oz)  Body mass index is 21.55 kg/m².      Intake/Output Summary (Last 24 hours) at 11/3/2023 0928  Last data filed at 11/3/2023 0614  Gross per 24 hour   Intake 4259.48 ml   Output 6727 ml   Net -2467.52 ml          Physical Exam  Vitals and nursing note reviewed.   Constitutional:       Interventions: He is intubated.      Comments: Intubated, off sedation, minimally responsive    HENT:      Head: Normocephalic and atraumatic.      Right Ear: External ear normal.      Left Ear: External ear normal.      Nose: Nose normal.      Mouth/Throat:      Comments: ETT in place   Eyes:      Comments: Pupils fixed and dilated    Cardiovascular:      Rate and Rhythm: Normal rate and regular rhythm.      Heart sounds: Normal heart sounds.   Pulmonary:      Effort: Pulmonary effort is normal. He is intubated.      Breath sounds: Normal breath sounds.   Abdominal:      Tenderness: There is no abdominal tenderness. There is no guarding or rebound.   Musculoskeletal:         General: No deformity or signs of injury.   Skin:     General: Skin is dry.      Findings: No rash.            Vents:  Vent Mode: A/C (11/03/23 0721)  Ventilator Initiated: Yes (11/01/23 0143)  Set Rate: 20 BPM (11/03/23 0721)  Vt Set: 400 mL (11/03/23 0721)  PEEP/CPAP: 5 cmH20 (11/03/23  0721)  Oxygen Concentration (%): 40 (11/03/23 0721)  Peak Airway Pressure: 21 cmH20 (11/03/23 0721)  Plateau Pressure: 20 cmH20 (11/03/23 0721)  Total Ve: 8.94 L/m (11/03/23 0721)  Negative Inspiratory Force (cm H2O): 0 (11/03/23 0721)  F/VT Ratio<105 (RSBI): (Abnormal) 16.88 (11/01/23 0736)  Lines/Drains/Airways       Central Venous Catheter Line       Name Duration    Trialysis (Dialysis) Catheter 11/01/23 0656 left internal jugular 2 days              Drain       Name Duration         NG/OG Tube 11/01/23 0010 Center mouth 2 days         Rectal Tube 11/01/23 0330 fecal management system 2 days              Airway       Name Duration         Airway - Non-Surgical 10/31/23 2337 Endotracheal Tube 2 days              Arterial Line       Name Duration    Arterial Line 11/01/23 1240 Right Radial 1 day              Peripheral Intravenous Line       Name Duration         Hemodialysis AV Fistula Left upper arm No Data         Peripheral IV - Single Lumen 11/01/23 0200 18 G Anterior;Right Upper Arm 2 days         Peripheral IV - Single Lumen 11/01/23 0200 20 G Anterior;Proximal;Right Forearm 2 days                  Significant Labs:    CBC/Anemia Profile:  Recent Labs   Lab 11/02/23  0929 11/02/23  1306 11/02/23  1306 11/03/23  0326   WBC 11.34 8.36  --  7.31   HGB 9.4* 7.8*  --  9.2*   HCT 28.8* 23.1* 30* 29.4*   PLT 62* 48*  --  48*   MCV 90 89  --  92   RDW 18.6* 18.5*  --  18.6*        Chemistries:  Recent Labs   Lab 11/02/23  0403 11/02/23  1306 11/02/23  1458 11/02/23  2129 11/03/23  0326   *  134* 139 136 136 137   K 4.5  4.5 3.4* 4.6 4.4 4.6     106 115* 107 104 107   CO2 19*  19* 14* 17* 20* 20*   BUN 15  15 17 18 9 10   CREATININE 1.4  1.4 1.2 1.4 0.7 0.8   CALCIUM 7.2*  7.2* 5.5* 7.0* 7.4* 7.5*   ALBUMIN 1.9*  1.9* 1.4* 1.8* 1.8* 1.8*   PROT 5.5* 4.3*  --   --  5.5*   BILITOT 1.2* 1.0  --   --  1.5*   ALKPHOS 239* 172*  --   --  214*   * 158*  --   --  186*   * 180*  --   --   170*   MG 1.9  --  2.0 1.8 1.8   PHOS 3.6  3.6  --  3.1 1.8* 2.1*       All pertinent labs within the past 24 hours have been reviewed.    Significant Imaging:  I have reviewed all pertinent imaging results/findings within the past 24 hours.

## 2023-11-03 NOTE — PROGRESS NOTES
11/03/23 0244   Treatment   Treatment Type SCUF   Treatment Status Order change   Dialysis Machine Number [id number]   Dialyzer Time (hours) 36.22   BVP (Liters) 219.3 L   Solutions Labeled and Current  Yes   Access Left;Temporary Cath;IJ   Catheter Dressing Intact  Yes   Alarms Engaged Yes   CRRT Comments SLED switched to SCUF     12 hour SLED completed. Machine settings modified to SCUF per MD orders. Patient will be on SCUF for 12 hours.

## 2023-11-03 NOTE — ASSESSMENT & PLAN NOTE
Currently intubated with GCS 3T off all sedation. No purposeful movements. Will attempt to contact family for GOC discussions. CT Head negative for acute change. EEG ordered, showing diffuse/severe encephalopathy. TSH wnl. Tox screen negative.  -Will trend hypoglycemia q1h, low threshold to start dextrose gtt.

## 2023-11-03 NOTE — CARE UPDATE
Reached patient's mother Kassandra by phone this morning shortly after she spoke with Dr. Marquez with Critical Care. Mother says that she is currently admitted to [facility] but thinks that she will be discharged today. She reports being told that the patient may not get better and I confirmed that he is not expected to recover. Explained that he is being kept alive on life support and that there is concern for anoxic brain injury. However, it is difficult to gauge if she fully comprehends the severity of his condition. Asked if mother will be able to visit tomorrow (assuming she is discharged) and she says that she is going to try. She also provided contact information for the patient's son in [place] and his brother who lives locally. She says that she already let his son know about his hospitalization. Updated Dr. Marquez about our conversation. Recommend that primary team reach out to patient's son to provide clinical update. Our team remains available and can assist with scheduling a family meeting if needed, either in person or by phone.    BERLIN Isabel, FNP-C  Palliative Medicine ext. [phone]

## 2023-11-03 NOTE — PROGRESS NOTES
[facility] - Cardiac Medical ICU  Critical Care Medicine  Progress Note    Patient Name: Cosme Lewis  MRN: [id number]  Admission Date: 10/31/2023  Hospital Length of Stay: 2 days  Code Status: DNR  Attending Provider: Jhonny Lezama MD  Primary Care Provider: Eliecer Ohara III, MD   Principal Problem: Cardiac arrest    Subjective:     HPI:  58 year old male with medical history significant for COPD, HFrEF (45% and DD), ESRD on dialysis (MWF), HTN, DM who was discharged yesterday after long admission including ICU stay for acute hypoxemic respiratory failure of multfactorial etiology presenting to emergency room after out-of-hospital cardiac arrest where ROSC was achieved. Per chart review, patient was last known normal at 9:10 pm. He was found to be unresponsive an hour later by nursing home staff and CPR was started.  He appeared to have removed his Nightly BiPAP. Pink frothy sputum was noted on arrival per EMS and he was also found to be hypoglycemic with BG of 32. EMS achieved ROSC after about 35 minutes. He received 2 amps of D50, 5 rounds of epi, 1 bicarb, 1g calcium , 450mL normal saline, and 1 push dose epi.    Patient arrived to the emergency room intubated. Large bloody bowel movement was noted. Hypothermic to 90.5 F, Bradycardic but normotensive. CT pan scan pending. Critical care medicine was consulted for lower GI bleed, lactic acidosis and post-cardiac arrest management.       Hospital/ICU Course:  Pt was admitted to Critical Care for acute hypoxic respiratory failure 2/t cardiac arrest s/p ROSC. He remains intubated on mechanical ventilation. Minimal responsiveness concerning for ischemic brain injury. CT Head was negative for acute change. EEG pending. On levo to maintain MAPs >60. Multiple efforts made to contact pt's family without success. VM left. Code status ordered as DNR with two physician consent due to poor prognosis. Palliative Care consulted for assistance with contacting family and  discussing GOC. Pt mom and son successfully contacted and informed of Mr Lewis's acute state of health. Pt's son stated that he would come to the hospital on 11/03/23 to discuss GOC.      Interval History/Significant Events: No acute events overnight. Pt remained on mechanical ventilation without sedation. No change in mentation.    Review of Systems   Unable to perform ROS: Intubated     Objective:     Vital Signs (Most Recent):  Temp: (Abnormal) 95.9 °F (35.5 °C) (11/03/23 0721)  Pulse: 65 (11/03/23 0721)  Resp: 17 (11/03/23 0301)  BP: (Abnormal) 101/55 (11/03/23 0301)  SpO2: 99 % (11/03/23 0721) Vital Signs (24h Range):  Temp:  [94.5 °F (34.7 °C)-97.7 °F (36.5 °C)] 95.9 °F (35.5 °C)  Pulse:  [] 65  Resp:  [17-18] 17  SpO2:  [88 %-100 %] 99 %  BP: (101-109)/(55-69) 101/55  Arterial Line BP: ()/(56-76) 106/56   Weight: 66.2 kg (145 lb 15.1 oz)  Body mass index is 21.55 kg/m².      Intake/Output Summary (Last 24 hours) at 11/3/2023 0928  Last data filed at 11/3/2023 0614  Gross per 24 hour   Intake 4259.48 ml   Output 6727 ml   Net -2467.52 ml          Physical Exam  Vitals and nursing note reviewed.   Constitutional:       Interventions: He is intubated.      Comments: Intubated, off sedation, minimally responsive    HENT:      Head: Normocephalic and atraumatic.      Right Ear: External ear normal.      Left Ear: External ear normal.      Nose: Nose normal.      Mouth/Throat:      Comments: ETT in place   Eyes:      Comments: Pupils fixed and dilated    Cardiovascular:      Rate and Rhythm: Normal rate and regular rhythm.      Heart sounds: Normal heart sounds.   Pulmonary:      Effort: Pulmonary effort is normal. He is intubated.      Breath sounds: Normal breath sounds.   Abdominal:      Tenderness: There is no abdominal tenderness. There is no guarding or rebound.   Musculoskeletal:         General: No deformity or signs of injury.   Skin:     General: Skin is dry.      Findings: No rash.             Vents:  Vent Mode: A/C (11/03/23 0721)  Ventilator Initiated: Yes (11/01/23 0143)  Set Rate: 20 BPM (11/03/23 0721)  Vt Set: 400 mL (11/03/23 0721)  PEEP/CPAP: 5 cmH20 (11/03/23 0721)  Oxygen Concentration (%): 40 (11/03/23 0721)  Peak Airway Pressure: 21 cmH20 (11/03/23 0721)  Plateau Pressure: 20 cmH20 (11/03/23 0721)  Total Ve: 8.94 L/m (11/03/23 0721)  Negative Inspiratory Force (cm H2O): 0 (11/03/23 0721)  F/VT Ratio<105 (RSBI): (Abnormal) 16.88 (11/01/23 0736)  Lines/Drains/Airways       Central Venous Catheter Line       Name Duration    Trialysis (Dialysis) Catheter 11/01/23 0656 left internal jugular 2 days              Drain       Name Duration         NG/OG Tube 11/01/23 0010 Center mouth 2 days         Rectal Tube 11/01/23 0330 fecal management system 2 days              Airway       Name Duration         Airway - Non-Surgical 10/31/23 2337 Endotracheal Tube 2 days              Arterial Line       Name Duration    Arterial Line 11/01/23 1240 Right Radial 1 day              Peripheral Intravenous Line       Name Duration         Hemodialysis AV Fistula Left upper arm No Data         Peripheral IV - Single Lumen 11/01/23 0200 18 G Anterior;Right Upper Arm 2 days         Peripheral IV - Single Lumen 11/01/23 0200 20 G Anterior;Proximal;Right Forearm 2 days                  Significant Labs:    CBC/Anemia Profile:  Recent Labs   Lab 11/02/23  0929 11/02/23  1306 11/02/23  1306 11/03/23  0326   WBC 11.34 8.36  --  7.31   HGB 9.4* 7.8*  --  9.2*   HCT 28.8* 23.1* 30* 29.4*   PLT 62* 48*  --  48*   MCV 90 89  --  92   RDW 18.6* 18.5*  --  18.6*        Chemistries:  Recent Labs   Lab 11/02/23  0403 11/02/23  1306 11/02/23  1458 11/02/23 2129 11/03/23  0326   *  134* 139 136 136 137   K 4.5  4.5 3.4* 4.6 4.4 4.6     106 115* 107 104 107   CO2 19*  19* 14* 17* 20* 20*   BUN 15  15 17 18 9 10   CREATININE 1.4  1.4 1.2 1.4 0.7 0.8   CALCIUM 7.2*  7.2* 5.5* 7.0* 7.4* 7.5*   ALBUMIN 1.9*  1.9*  1.4* 1.8* 1.8* 1.8*   PROT 5.5* 4.3*  --   --  5.5*   BILITOT 1.2* 1.0  --   --  1.5*   ALKPHOS 239* 172*  --   --  214*   * 158*  --   --  186*   * 180*  --   --  170*   MG 1.9  --  2.0 1.8 1.8   PHOS 3.6  3.6  --  3.1 1.8* 2.1*       All pertinent labs within the past 24 hours have been reviewed.    Significant Imaging:  I have reviewed all pertinent imaging results/findings within the past 24 hours.      ABG  Recent Labs   Lab 11/02/23  1306   PH 7.411   PO2 114*   PCO2 30.7*   HCO3 19.5*   BE -5*     Assessment/Plan:     Neuro  Anoxic brain injury  Currently intubated with GCS 3T off all sedation. No purposeful movements. Will attempt to contact family for GOC discussions. CT Head negative for acute change. EEG ordered, showing diffuse/severe encephalopathy. TSH wnl. Tox screen negative.  -Will trend hypoglycemia q1h, low threshold to start dextrose gtt.        Cardiac/Vascular  * Cardiac arrest  Out-of-hospital cardiac arrest in the setting of suspected hypoxia. Pt had CBG 32 & given 2 amps of D50 total. Also received 5 rounds of epi, 1 bicarb, 1g calcium , 450mL normal saline, and 1 push dose epi per ems. Arrives to ER normal sinus 62 & normotensive. EKG notable for irregularly irregular bradycardia and global low voltage. Bedside echo in the ED notable for diffuse pericardial effusion without tamponade physiology, was also hypothermic on arrival at 88.7. Diffuse watery/bloody diarrhea in ED.    -Broad differential including tachy dysrhythmia, obstructive pathology, endocrinopathy, tox,    -Formal Echo pending, BCx/UCx pending  -On broad spectrum ABx.   -CT Head, CTA chest, A/P pending  -Initial trop 0.04, will trend and add serial EKGs but low suspicion for active ischemic event at this time.   -targeted temperature management in the setting of hypothermia   -Remains off sedation with no purposeful movement    Pericardial effusion  Cardiology consulted for moderate pericardial effusion. No  indication of tamponade. No intervention required  - Hold GDMT held in setting of hypotension  - Continue vasopressors as indicated    Chronic combined systolic and diastolic heart failure  TTE 9/2023: Mildly reduced systolic function with a visually estimated ejection fraction of 40 - 45%. There is indeterminate diastolic function.  - In the setting of cardiac arrest with ROSC, willl hold GDMT  - Repeat Echo ordered        Renal/  Metabolic acidosis, increased anion gap  HAGMA with Anion gap of 20 and serum bicarb of 12 on admission with concurrent respiratory acidosis (measured pCP2=38 and expected of 28-32), as well as a non-anion gap metabolic acidosis. Suspect HAGMA secondary to his lactic acidosis, and NAGMA due to hsi renal failure. Nephrology consulted for acute renal failure.  --12 hours SLED then 12 hours SCUF  --Monitor vent settings for respiratory acidosis.     ESRD (end stage renal disease)  HD through LUE AV fistula - per chart review last HD on 10/30. Current chemistries stable. BUN/Cr appears at baseline. Nephrology consulted.  -Trend CMPs   -Strict I&Os  -LIJ trialysis placed for CRRT   -Avoid nephrotoxic medications    Palliative Care  Goals of care, counseling/discussion  Pt currently DNR. Pt's mother and son are aware of severity of Mr Lewis's health. Pt's son reported that he would come to the hospital to discuss GOC.  - Discussions ongoing.      Other  Acute hypoxic respiratory failure  Patient with Hypoxic Respiratory failure which is Acute.  he is not on home oxygen. Supplemental oxygen was provided and noted- Vent Mode: A/C  Oxygen Concentration (%):  [] 60  Resp Rate Total:  [24 br/min-36 br/min] 28 br/min  Vt Set:  [400 mL] 400 mL  PEEP/CPAP:  [5 cmH20] 5 cmH20  Mean Airway Pressure:  [8.8 xgG20-42 cmH20] 9.6 cmH20    .   Signs/symptoms of respiratory failure include- respiratory distress. Contributing diagnoses includes - COPD Labs and images were reviewed. Patient Has recent ABG,  which has been reviewed. Will treat underlying causes and adjust management of respiratory failure as follows    COPD on QHS BiPAP and was found unresponsive off BiPAP prior to ACLS start. Presented intubated. Vent settings as above.   -Wean vent as tolerated  -ABG PRN  -CXR with R interstitial opacifications concerning for possible aspiration. CT PE study pending for further characterization        Critical Care Daily Checklist:    A: Awake: RASS Goal/Actual Goal: RASS Goal: 0-->alert and calm  Actual:     B: Spontaneous Breathing Trial Performed? Spon. Breathing Trial Initiated?: Not initiated (11/03/23 3677)   C: SAT & SBT Coordinated?  SAT failed                      D: Delirium: CAM-ICU Overall CAM-ICU: Positive   E: Early Mobility Performed? No   F: Feeding Goal: Goals: Meet % EEN, EPN by RD f/u date  Status: Nutrition Goal Status: new   Current Diet Order   Procedures    Diet NPO      AS: Analgesia/Sedation Off sedation   T: Thromboembolic Prophylaxis thrombocytopenia   H: HOB > 300 Yes   U: Stress Ulcer Prophylaxis (if needed) PPI   G: Glucose Control SSI   B: Bowel Function Stool Occurrence: 1   I: Indwelling Catheter (Lines & Dutta) Necessity Trialysis, PIV, rectal tube   D: De-escalation of Antimicrobials/Pharmacotherapies Vanc Zosyn    Plan for the day/ETD GOC    Code Status:  Family/Goals of Care: DNR  Ongoing       Critical secondary to Patient has a condition that poses threat to life and bodily function: Severe Respiratory Distress      Critical care was time spent personally by me on the following activities: development of treatment plan with patient or surrogate and bedside caregivers, discussions with consultants, evaluation of patient's response to treatment, examination of patient, ordering and performing treatments and interventions, ordering and review of laboratory studies, ordering and review of radiographic studies, pulse oximetry, re-evaluation of patient's condition. This critical  care time did not overlap with that of any other provider or involve time for any procedures.     Tae Marquez MD  Critical Care Medicine  [facility] - Cardiac Medical ICU

## 2023-11-03 NOTE — PLAN OF CARE
MICU DAILY GOALS     Family/Goals of care/Code Status   Code Status: DNR    24H Vital Sign Range  Temp:  [94.5 °F (34.7 °C)-97.7 °F (36.5 °C)]   Pulse:  []   Resp:  [17-18]   BP: (109)/(60-69)   SpO2:  [68 %-100 %]   Arterial Line BP: ()/(54-76)      Shift Events   No acute events throughout shift, patient still on Levo @ 0.02 with a map of 73. Still on D20 @ 25. Crrt UF @ goal of 300, tolerating well.     AWAKE RASS: Goal - RASS Goal: 0-->alert and calm  Actual - RASS (Osorio Agitation-Sedation Scale): drowsy    Restraint necessity: Not necessary   BREATHE SBT: Pass    Coordinate A & B, analgesics/sedatives Pain: managed   SAT: Pass   Delirium CAM-ICU: Overall CAM-ICU: Positive   Early(intubated/ Progressive (non-intubated) Mobility MOVE Screen (INTUBATED ONLY): Pass    Activity: Activity Management: Patient unable to perform activities   Feeding/Nutrition Diet order: Diet/Nutrition Received: NPO,     Thrombus DVT prophylaxis: VTE Required Core Measure: Pharmacological prophylaxis initiated/maintained   HOB Elevation Head of Bed (HOB) Positioning: HOB at 30 degrees   Ulcer Prophylaxis GI: yes   Glucose control managed Glycemic Management: blood glucose monitored   Skin Skin assessed during: Daily Assessment    Sacrum intact? Excoriated   Heels intact? Yes  Surgical wound? No    [] No Altered Skin Integrity Present    []Prevention Measures Documented    [] Altered Skin Integrity Present or Discovered   [] LDA present /added in EPIC   [] Wound Image Taken     Wound Care Consulted? No    Attending Nurse:  Ann Lagunas RN/Staff Member:  Shayla Thorpe RN   Bowel Function Fecal incontinent, flexicele   Indwelling Catheter Necessity [REMOVED]      Urethral Catheter 11/01/23 0009 Temperature probe 16 Fr.-Reason for Continuing Urinary Catheterization: Critically ill in ICU and requiring hourly monitoring of intake/output    Trialysis (Dialysis) Catheter 11/01/23 0656 left internal jugular-Line Necessity  Review: CRRT/HD  Clinically necessary, ICU   De-escalation Antibiotics Yes       VS and assessment per flow sheet, patient progressing towards goals as tolerated, plan of care reviewed with [unfilled] and family, all concerns addressed, will continue to monitor.

## 2023-11-03 NOTE — ASSESSMENT & PLAN NOTE
Patient is MWF hemodialyisis via LUE AVF, last underwent dialysis on Monday 10/30. Current labs on admission showed acidosis, but otherwise consistent with ESRD.    ESRD on iHD MWF  [facility]  4 hours  EDW: 71 kg  UMA AVF    Recommendations  -Continue 12 hours SLED  tonight.   -continue strict I/O's  -RFP daily  -renal diet when not NPO.  -Phos elevated at this time, was WNL on 10/30. Recommend repeating phos levels.     Anemia of chronic disease  Lab Results   Component Value Date    HGB 9.2 (L) 11/03/2023    HCT 29.4 (L) 11/03/2023       Calcium   Date Value Ref Range Status   11/03/2023 7.5 (L) 8.7 - 10.5 mg/dL Final   11/02/2023 7.4 (L) 8.7 - 10.5 mg/dL Final     Ionized Calcium   Date Value Ref Range Status   11/02/2023 0.94 (L) 1.06 - 1.42 mmol/L Final     Phosphorus   Date Value Ref Range Status   11/03/2023 2.1 (L) 2.7 - 4.5 mg/dL Final   11/02/2023 1.8 (L) 2.7 - 4.5 mg/dL Final

## 2023-11-03 NOTE — PROGRESS NOTES
JUAN F d/c'd.  Blood returned.  LIJ CVC flushed and capped.  Tolerated well.    POC discussed with primary RN.

## 2023-11-03 NOTE — PLAN OF CARE
On 11/03/23 at 345pm, family present at bedside to discuss GOC. A summary of ongoing care was provided. The acuity of Mr Lewis's anoxic brain injury was discussed. All questions were answered. Cosme Lewis Jr, Pt's son, reported that he wants to discontinue invasive treatment and focus on comfort measures. This included RRT and unnecessary labs. He expressed that he wanted to discuss with the rest of the family and give other family members the opportunity to see Mr Lewis before taking him off the ventilator. Palliative Care (ERLIN Yuen) was present to help facilitate the discussion. The family was appreciative of the ongoing care. Follow up with the family for withdrawal of care expected by 11/05/23.     Tae Marquez MD  Critical Care, PGY-3  [facility]

## 2023-11-03 NOTE — SUBJECTIVE & OBJECTIVE
Interval History: Patient remains intubated not on any sedation at this time. He withdraws to painful stimuli but no purposeful movements.     Review of patient's allergies indicates:  No Active Allergies  Current Facility-Administered Medications   Medication Frequency    0.9%  NaCl infusion (CRRT USE ONLY) Continuous    ampicillin (OMNIPEN) 2 g in sodium chloride 0.9 % 100 mL IVPB (MB+) Q8H    artificial tears 0.5 % ophthalmic solution 1 drop TID    dextrose 10% bolus 125 mL 125 mL PRN    dextrose 10% bolus 250 mL 250 mL PRN    glucagon (human recombinant) injection 1 mg PRN    glucose chewable tablet 16 g PRN    glucose chewable tablet 24 g PRN    hydrocortisone sodium succinate injection 100 mg Q8H    magnesium sulfate 2g in water 50mL IVPB (premix) PRN    mupirocin 2 % ointment BID    NORepinephrine 8 mg in dextrose 5% 250 mL infusion Continuous    pantoprazole injection 40 mg Once    sodium chloride 0.9% flush 10 mL PRN    white petrolatum-mineral oil (LUBIFRESH P.M.) ophthalmic ointment QHS       Objective:     Vital Signs (Most Recent):  Temp: (!) 95.9 °F (35.5 °C) (11/03/23 0721)  Pulse: 65 (11/03/23 0721)  Resp: 17 (11/03/23 0301)  BP: (!) 101/55 (11/03/23 0301)  SpO2: 99 % (11/03/23 0721) Vital Signs (24h Range):  Temp:  [94.5 °F (34.7 °C)-97.7 °F (36.5 °C)] 95.9 °F (35.5 °C)  Pulse:  [] 65  Resp:  [17-18] 17  SpO2:  [88 %-100 %] 99 %  BP: (101-109)/(55-69) 101/55  Arterial Line BP: ()/(56-76) 106/56     Weight: 66.2 kg (145 lb 15.1 oz) (11/01/23 1152)  Body mass index is 21.55 kg/m².  Body surface area is 1.8 meters squared.    I/O last 3 completed shifts:  In: 8905.4 [I.V.:8023.7; NG/GT:40; IV Piggyback:841.8]  Out: 12919 [Other:17460]     Physical Exam  Constitutional:       Comments: Non-responsive and intubated.    HENT:      Right Ear: External ear normal.      Left Ear: External ear normal.      Mouth/Throat:      Mouth: Mucous membranes are dry.      Comments: Intubated.    Cardiovascular:      Rate and Rhythm: Normal rate and regular rhythm.      Pulses: Normal pulses.      Heart sounds: Murmur heard.   Pulmonary:      Comments: Mechanical breath sounds.   Abdominal:      General: Abdomen is flat.      Palpations: Abdomen is soft.   Musculoskeletal:         General: No swelling.      Right lower leg: No edema.      Left lower leg: No edema.   Skin:     General: Skin is warm and dry.      Capillary Refill: Capillary refill takes 2 to 3 seconds.   Neurological:      Comments: Non-responsive, eyes open.           Significant Labs:  CBC:   Recent Labs   Lab 11/03/23 0326   WBC 7.31   RBC 3.20*   HGB 9.2*   HCT 29.4*   PLT 48*   MCV 92   MCH 28.8   MCHC 31.3*       CMP:   Recent Labs   Lab 11/03/23 0326   GLU 94   CALCIUM 7.5*   ALBUMIN 1.8*   PROT 5.5*      K 4.6   CO2 20*      BUN 10   CREATININE 0.8   ALKPHOS 214*   *   *   BILITOT 1.5*       All labs within the past 24 hours have been reviewed.

## 2023-11-03 NOTE — HPI
Mr. Lewis is a 57 y/o male with PMHx of COPD, HFrEF, ESRD on HD, HTN, and DM who was recent admitted from 10/11-10/30 for acute hypoxemic respiratory failure who presented to the ED on 10/31 after cardiac arrest at his nursing home. He was just discharged on 10/30 after a prolonged hospital stay for acute hypoxemic respiratory failure. The next night he was found unresponsive by nursing home staff after apparently removing his nightly BiPAP. Pink frothy sputum was noted on arrival per EMS and he was also found to be hypoglycemic with BG of 32. EMS achieved ROSC after about 35 minutes. He received 2 amps of D50, 5 rounds of epi, 1 bicarb, 1g calcium , 450mL normal saline, and 1 push dose epi.     Patient arrived to the emergency room intubated. Large bloody bowel movement was noted. Hypothermic to 90.5 F, Bradycardic but normotensive. CT pan scan pending. Critical care medicine was consulted for lower GI bleed, lactic acidosis and post-cardiac arrest management.         Hospital/ICU Course:  Pt was admitted to Critical Care for acute hypoxic respiratory failure 2/t cardiac arrest s/p ROSC. He remains intubated on mechanical ventilation. Minimal responsiveness concerning for ischemic brain injury. CT Head was negative for acute change. EEG pending. On levo to maintain MAPs >60. Multiple efforts made to contact pt's family without success. VM left. Code status ordered as DNR with two physician consent due to poor prognosis. Palliative Care consulted for assistance with contacting family and discussing GOC.

## 2023-11-03 NOTE — PROGRESS NOTES
[facility] - Cardiac Medical ICU  Nephrology  Progress Note    Patient Name: Cosme Lewis  MRN: [id number]  Admission Date: 10/31/2023  Hospital Length of Stay: 2 days  Attending Provider: Jhonny Lezama MD   Primary Care Physician: Eliecer Ohara III, MD  Principal Problem:Cardiac arrest    Subjective:     HPI: Mr. Lewis is a 58 year old male with a PMH of ESRD on hemodialysis MWF via LUE AVF, HTN, HFrEF, DM. He was recently discharged from the hospital on 10/30 after a prolonged hospital stay for altered mental status, acute on chronic hypoxic respiratory failure with hypercapnia, covid pneumonia and CAP. He was subsequently discharged to [facility] where he was last seen well around 2110. Staff checked on him again at 2210 and noted him to be unresponsive without a pulse. ACLS was initiated, when EMS arrived, they noted that he had pink frothy sputum presenting from his mouth, blood glucose was 32. In total he received 35 minutes of CPR, 2 amps of D50W, 5 rounds of epinephrine, 1 push of sodium bicarbonate, 1g of calcium, 450 mL of normal saline. As per chart review, it is thought that the patient removed his BiPAP which resulted in his cardiac arrest. Patient was intubated by EMS in the field. Patient reportedly had a large bloody bowel movement in the ER.     He underwent CT scan of the head, abdomen, pelvis, which showed no acute intracranial abnormalities, no PE, positive for potential pericardial effusion, right hepatic vein reflux of IV contrast, bilateral lung opacities, stable bilateral pleural effusions, diffuse small bowel wall thickening, stable ascites, and sternal fractures.     Labs on arrival showed a hemoglobin of 7.6, Na of 133, K of 5.2, Cl of 101, bicarb of 12 with AG of 20, BUN 43, and Cr of 3.7. He was started on Levophed to maintain MAP> 65mmHg and a right sided trialysis line was placed.     Interval History: Patient remains intubated not on any sedation at this time. He withdraws  to painful stimuli but no purposeful movements.     Review of patient's allergies indicates:  No Active Allergies  Current Facility-Administered Medications   Medication Frequency    0.9%  NaCl infusion (CRRT USE ONLY) Continuous    ampicillin (OMNIPEN) 2 g in sodium chloride 0.9 % 100 mL IVPB (MB+) Q8H    artificial tears 0.5 % ophthalmic solution 1 drop TID    dextrose 10% bolus 125 mL 125 mL PRN    dextrose 10% bolus 250 mL 250 mL PRN    glucagon (human recombinant) injection 1 mg PRN    glucose chewable tablet 16 g PRN    glucose chewable tablet 24 g PRN    hydrocortisone sodium succinate injection 100 mg Q8H    magnesium sulfate 2g in water 50mL IVPB (premix) PRN    mupirocin 2 % ointment BID    NORepinephrine 8 mg in dextrose 5% 250 mL infusion Continuous    pantoprazole injection 40 mg Once    sodium chloride 0.9% flush 10 mL PRN    white petrolatum-mineral oil (LUBIFRESH P.M.) ophthalmic ointment QHS       Objective:     Vital Signs (Most Recent):  Temp: (!) 95.9 °F (35.5 °C) (11/03/23 0721)  Pulse: 65 (11/03/23 0721)  Resp: 17 (11/03/23 0301)  BP: (!) 101/55 (11/03/23 0301)  SpO2: 99 % (11/03/23 0721) Vital Signs (24h Range):  Temp:  [94.5 °F (34.7 °C)-97.7 °F (36.5 °C)] 95.9 °F (35.5 °C)  Pulse:  [] 65  Resp:  [17-18] 17  SpO2:  [88 %-100 %] 99 %  BP: (101-109)/(55-69) 101/55  Arterial Line BP: ()/(56-76) 106/56     Weight: 66.2 kg (145 lb 15.1 oz) (11/01/23 1152)  Body mass index is 21.55 kg/m².  Body surface area is 1.8 meters squared.    I/O last 3 completed shifts:  In: 8905.4 [I.V.:8023.7; NG/GT:40; IV Piggyback:841.8]  Out: 03608 [Other:07827]    Physical Exam  Constitutional:       Comments: Non-responsive and intubated.    HENT:      Right Ear: External ear normal.      Left Ear: External ear normal.      Mouth/Throat:      Mouth: Mucous membranes are dry.      Comments: Intubated.   Cardiovascular:      Rate and Rhythm: Normal rate and regular rhythm.      Pulses: Normal pulses.       Heart sounds: Murmur heard.   Pulmonary:      Comments: Mechanical breath sounds.   Abdominal:      General: Abdomen is flat.      Palpations: Abdomen is soft.   Musculoskeletal:         General: No swelling.      Right lower leg: No edema.      Left lower leg: No edema.   Skin:     General: Skin is warm and dry.      Capillary Refill: Capillary refill takes 2 to 3 seconds.   Neurological:      Comments: Non-responsive, eyes open.           Significant Labs:  CBC:   Recent Labs   Lab 11/03/23  0326   WBC 7.31   RBC 3.20*   HGB 9.2*   HCT 29.4*   PLT 48*   MCV 92   MCH 28.8   MCHC 31.3*       CMP:   Recent Labs   Lab 11/03/23  0326   GLU 94   CALCIUM 7.5*   ALBUMIN 1.8*   PROT 5.5*      K 4.6   CO2 20*      BUN 10   CREATININE 0.8   ALKPHOS 214*   *   *   BILITOT 1.5*       All labs within the past 24 hours have been reviewed.   Assessment/Plan:     Neuro  Anoxic brain injury  -Per primary team    Cardiac/Vascular  * Cardiac arrest  Per primary team.     Pericardial effusion  -Per primary team.   -No tamponade noted on cardiac echo.     Chronic combined systolic and diastolic heart failure  -Per primary team.     Renal/  ESRD (end stage renal disease)  Patient is MWF hemodialyisis via LUE AVF, last underwent dialysis on Monday 10/30. Current labs on admission showed acidosis, but otherwise consistent with ESRD.    ESRD on iHD MWF  [facility] [place]  4 hours  EDW: 71 kg  UMA AVF    Recommendations  -Continue 12 hours SLED  tonight.   -continue strict I/O's  -RFP daily  -renal diet when not NPO.  -Phos elevated at this time, was WNL on 10/30. Recommend repeating phos levels.     Anemia of chronic disease  Lab Results   Component Value Date    HGB 9.2 (L) 11/03/2023    HCT 29.4 (L) 11/03/2023       Calcium   Date Value Ref Range Status   11/03/2023 7.5 (L) 8.7 - 10.5 mg/dL Final   11/02/2023 7.4 (L) 8.7 - 10.5 mg/dL Final     Ionized Calcium   Date Value Ref Range Status   11/02/2023  0.94 (L) 1.06 - 1.42 mmol/L Final     Phosphorus   Date Value Ref Range Status   11/03/2023 2.1 (L) 2.7 - 4.5 mg/dL Final   11/02/2023 1.8 (L) 2.7 - 4.5 mg/dL Final       Other  Acute hypoxic respiratory failure  Per primary team        Thank you for your consult. I will follow-up with patient. Please contact us if you have any additional questions.    Dami Tony MD  Nephrology  [facility] - Cardiac Medical ICU

## 2023-11-04 NOTE — SUBJECTIVE & OBJECTIVE
Interval History/Significant Events: No acute events overnight. Pt off-sedation, intubated on mechanical ventilation with minimal pressor requirements. GOC discussions ongoing     Review of Systems   Unable to perform ROS: Intubated     Objective:     Vital Signs (Most Recent):  Temp: 96.1 °F (35.6 °C) (11/04/23 0901)  Pulse: 98 (11/04/23 0901)  Resp: 16 (11/04/23 0901)  BP: (Abnormal) 29/9 (11/04/23 0033)  SpO2: 97 % (11/04/23 0901) Vital Signs (24h Range):  Temp:  [95 °F (35 °C)-96.6 °F (35.9 °C)] 96.1 °F (35.6 °C)  Pulse:  [] 98  Resp:  [16] 16  SpO2:  [91 %-100 %] 97 %  BP: (29)/(9) 29/9  Arterial Line BP: ()/(48-81) 108/68   Weight: 66.2 kg (145 lb 15.1 oz)  Body mass index is 21.55 kg/m².      Intake/Output Summary (Last 24 hours) at 11/4/2023 0905  Last data filed at 11/4/2023 0901  Gross per 24 hour   Intake 1207.77 ml   Output 152 ml   Net 1055.77 ml          Physical Exam  Vitals and nursing note reviewed.   Constitutional:       Interventions: He is intubated.      Comments: Intubated, off sedation, minimally responsive    HENT:      Head: Normocephalic and atraumatic.      Right Ear: External ear normal.      Left Ear: External ear normal.      Nose: Nose normal.      Mouth/Throat:      Comments: ETT in place   Eyes:      Comments: Pupils fixed and dilated    Cardiovascular:      Rate and Rhythm: Normal rate and regular rhythm.      Heart sounds: Normal heart sounds.   Pulmonary:      Effort: Pulmonary effort is normal. He is intubated.      Breath sounds: Normal breath sounds.   Abdominal:      Tenderness: There is no abdominal tenderness. There is no guarding or rebound.   Musculoskeletal:         General: No deformity or signs of injury.   Skin:     General: Skin is dry.      Findings: No rash.   Neurological:      GCS: GCS eye subscore is 1. GCS verbal subscore is 1. GCS motor subscore is 1.            Vents:  Vent Mode: A/C (11/04/23 0707)  Ventilator Initiated: Yes (11/01/23 0143)  Set  Rate: 16 BPM (11/04/23 0707)  Vt Set: 400 mL (11/04/23 0707)  PEEP/CPAP: 5 cmH20 (11/04/23 0707)  Oxygen Concentration (%): 30 (11/04/23 0901)  Peak Airway Pressure: 22 cmH20 (11/04/23 0707)  Plateau Pressure: 20 cmH20 (11/04/23 0707)  Total Ve: 8.39 L/m (11/04/23 0707)  Negative Inspiratory Force (cm H2O): 0 (11/04/23 0707)  F/VT Ratio<105 (RSBI): (Abnormal) 16.88 (11/01/23 0736)  Lines/Drains/Airways       Central Venous Catheter Line       Name Duration    Trialysis (Dialysis) Catheter 11/01/23 0656 left internal jugular 3 days              Drain       Name Duration         NG/OG Tube 11/01/23 0010 Center mouth 3 days         Rectal Tube 11/01/23 0330 fecal management system 3 days              Airway       Name Duration         Airway - Non-Surgical 10/31/23 2337 Endotracheal Tube 3 days              Arterial Line       Name Duration    Arterial Line 11/01/23 1240 Right Radial 2 days              Peripheral Intravenous Line       Name Duration         Hemodialysis AV Fistula Left upper arm No Data         Peripheral IV - Single Lumen 11/01/23 0200 18 G Anterior;Right Upper Arm 3 days         Peripheral IV - Single Lumen 11/01/23 0200 20 G Anterior;Proximal;Right Forearm 3 days                  Significant Labs:    CBC/Anemia Profile:  Recent Labs   Lab 11/02/23  0929 11/02/23  1306 11/02/23  1306 11/03/23  0326   WBC 11.34 8.36  --  7.31   HGB 9.4* 7.8*  --  9.2*   HCT 28.8* 23.1* 30* 29.4*   PLT 62* 48*  --  48*   MCV 90 89  --  92   RDW 18.6* 18.5*  --  18.6*        Chemistries:  Recent Labs   Lab 11/02/23  1306 11/02/23  1458 11/02/23  2129 11/03/23  0326    136 136 137   K 3.4* 4.6 4.4 4.6   * 107 104 107   CO2 14* 17* 20* 20*   BUN 17 18 9 10   CREATININE 1.2 1.4 0.7 0.8   CALCIUM 5.5* 7.0* 7.4* 7.5*   ALBUMIN 1.4* 1.8* 1.8* 1.8*   PROT 4.3*  --   --  5.5*   BILITOT 1.0  --   --  1.5*   ALKPHOS 172*  --   --  214*   *  --   --  186*   *  --   --  170*   MG  --  2.0 1.8 1.8   PHOS   --  3.1 1.8* 2.1*       All pertinent labs within the past 24 hours have been reviewed.    Significant Imaging:  I have reviewed all pertinent imaging results/findings within the past 24 hours.

## 2023-11-04 NOTE — ASSESSMENT & PLAN NOTE
Pt currently DNR. Pt's mother and son are aware of severity of Mr Lewis's health. GOC discussion held with medical team and Pt's son. Focus on comfort measures and avoid invasive procedures. This included RRT and unnecessary labs. Extubation to take place after all the family has had time to visit pt.  -- Advance to comfort care per family  -- Plan future extubation

## 2023-11-04 NOTE — ASSESSMENT & PLAN NOTE
HAGMA with Anion gap of 20 and serum bicarb of 12 on admission with concurrent respiratory acidosis (measured pCP2=38 and expected of 28-32), as well as a non-anion gap metabolic acidosis. Suspect HAGMA secondary to his lactic acidosis, and NAGMA due to hsi renal failure. Nephrology consulted for acute renal failure.  --12 hours SLED then 12 hours SCUF  --Monitor vent settings for respiratory acidosis.   --Discontinue RRT as indicated by GOC

## 2023-11-04 NOTE — NURSING
Fingerstick checked, reading <20. Re-check off of arterial line, reading 23. MD Tae Marquez notified of results. Per MD give PRN D10 250 ml bolus. Will closely monitor.

## 2023-11-04 NOTE — PROGRESS NOTES
[facility] - Cardiac Medical ICU  Critical Care Medicine  Progress Note    Patient Name: Cosme Lewis  MRN: [id number]  Admission Date: 10/31/2023  Hospital Length of Stay: 3 days  Code Status: DNR  Attending Provider: Jhonny Lezama MD  Primary Care Provider: Eliecer Ohara III, MD   Principal Problem: Cardiac arrest    Subjective:     HPI:  58 year old male with medical history significant for COPD, HFrEF (45% and DD), ESRD on dialysis (MWF), HTN, DM who was discharged yesterday after long admission including ICU stay for acute hypoxemic respiratory failure of multfactorial etiology presenting to emergency room after out-of-hospital cardiac arrest where ROSC was achieved. Per chart review, patient was last known normal at 9:10 pm. He was found to be unresponsive an hour later by nursing home staff and CPR was started.  He appeared to have removed his Nightly BiPAP. Pink frothy sputum was noted on arrival per EMS and he was also found to be hypoglycemic with BG of 32. EMS achieved ROSC after about 35 minutes. He received 2 amps of D50, 5 rounds of epi, 1 bicarb, 1g calcium , 450mL normal saline, and 1 push dose epi.    Patient arrived to the emergency room intubated. Large bloody bowel movement was noted. Hypothermic to 90.5 F, Bradycardic but normotensive. CT pan scan pending. Critical care medicine was consulted for lower GI bleed, lactic acidosis and post-cardiac arrest management.       Hospital/ICU Course:  Pt was admitted to Critical Care for acute hypoxic respiratory failure 2/t cardiac arrest s/p ROSC. He remains intubated on mechanical ventilation. Minimal responsiveness concerning for ischemic brain injury. CT Head was negative for acute change. EEG pending. On levo to maintain MAPs >60. Multiple efforts made to contact pt's family without success. VM left. Code status ordered as DNR with two physician consent due to poor prognosis. Palliative Care consulted for assistance with contacting family and  discussing GOC. Pt mom and son successfully contacted and informed of Mr Lewis's acute state of health. Cosme Lewis Jr was met by the medical team and GOC were discussed. He stated that he would prefer to discontinue invasive treatment and focus on comfort measures. This included RRT and unnecessary labs. Family would like to be given the chance to visit the pt at bedside prior to extubation.      Interval History/Significant Events: No acute events overnight. Pt off-sedation, intubated on mechanical ventilation with minimal pressor requirements. GOC discussions ongoing     Review of Systems   Unable to perform ROS: Intubated     Objective:     Vital Signs (Most Recent):  Temp: 96.1 °F (35.6 °C) (11/04/23 0901)  Pulse: 98 (11/04/23 0901)  Resp: 16 (11/04/23 0901)  BP: (Abnormal) 29/9 (11/04/23 0033)  SpO2: 97 % (11/04/23 0901) Vital Signs (24h Range):  Temp:  [95 °F (35 °C)-96.6 °F (35.9 °C)] 96.1 °F (35.6 °C)  Pulse:  [] 98  Resp:  [16] 16  SpO2:  [91 %-100 %] 97 %  BP: (29)/(9) 29/9  Arterial Line BP: ()/(48-81) 108/68   Weight: 66.2 kg (145 lb 15.1 oz)  Body mass index is 21.55 kg/m².      Intake/Output Summary (Last 24 hours) at 11/4/2023 0905  Last data filed at 11/4/2023 0901  Gross per 24 hour   Intake 1207.77 ml   Output 152 ml   Net 1055.77 ml          Physical Exam  Vitals and nursing note reviewed.   Constitutional:       Interventions: He is intubated.      Comments: Intubated, off sedation, minimally responsive    HENT:      Head: Normocephalic and atraumatic.      Right Ear: External ear normal.      Left Ear: External ear normal.      Nose: Nose normal.      Mouth/Throat:      Comments: ETT in place   Eyes:      Comments: Pupils fixed and dilated    Cardiovascular:      Rate and Rhythm: Normal rate and regular rhythm.      Heart sounds: Normal heart sounds.   Pulmonary:      Effort: Pulmonary effort is normal. He is intubated.      Breath sounds: Normal breath sounds.   Abdominal:       Tenderness: There is no abdominal tenderness. There is no guarding or rebound.   Musculoskeletal:         General: No deformity or signs of injury.   Skin:     General: Skin is dry.      Findings: No rash.   Neurological:      GCS: GCS eye subscore is 1. GCS verbal subscore is 1. GCS motor subscore is 1.            Vents:  Vent Mode: A/C (11/04/23 0707)  Ventilator Initiated: Yes (11/01/23 0143)  Set Rate: 16 BPM (11/04/23 0707)  Vt Set: 400 mL (11/04/23 0707)  PEEP/CPAP: 5 cmH20 (11/04/23 0707)  Oxygen Concentration (%): 30 (11/04/23 0901)  Peak Airway Pressure: 22 cmH20 (11/04/23 0707)  Plateau Pressure: 20 cmH20 (11/04/23 0707)  Total Ve: 8.39 L/m (11/04/23 0707)  Negative Inspiratory Force (cm H2O): 0 (11/04/23 0707)  F/VT Ratio<105 (RSBI): (Abnormal) 16.88 (11/01/23 0736)  Lines/Drains/Airways       Central Venous Catheter Line       Name Duration    Trialysis (Dialysis) Catheter 11/01/23 0656 left internal jugular 3 days              Drain       Name Duration         NG/OG Tube 11/01/23 0010 Center mouth 3 days         Rectal Tube 11/01/23 0330 fecal management system 3 days              Airway       Name Duration         Airway - Non-Surgical 10/31/23 2337 Endotracheal Tube 3 days              Arterial Line       Name Duration    Arterial Line 11/01/23 1240 Right Radial 2 days              Peripheral Intravenous Line       Name Duration         Hemodialysis AV Fistula Left upper arm No Data         Peripheral IV - Single Lumen 11/01/23 0200 18 G Anterior;Right Upper Arm 3 days         Peripheral IV - Single Lumen 11/01/23 0200 20 G Anterior;Proximal;Right Forearm 3 days                  Significant Labs:    CBC/Anemia Profile:  Recent Labs   Lab 11/02/23  0929 11/02/23  1306 11/02/23  1306 11/03/23  0326   WBC 11.34 8.36  --  7.31   HGB 9.4* 7.8*  --  9.2*   HCT 28.8* 23.1* 30* 29.4*   PLT 62* 48*  --  48*   MCV 90 89  --  92   RDW 18.6* 18.5*  --  18.6*        Chemistries:  Recent Labs   Lab 11/02/23  1736  11/02/23  1458 11/02/23  2129 11/03/23  0326    136 136 137   K 3.4* 4.6 4.4 4.6   * 107 104 107   CO2 14* 17* 20* 20*   BUN 17 18 9 10   CREATININE 1.2 1.4 0.7 0.8   CALCIUM 5.5* 7.0* 7.4* 7.5*   ALBUMIN 1.4* 1.8* 1.8* 1.8*   PROT 4.3*  --   --  5.5*   BILITOT 1.0  --   --  1.5*   ALKPHOS 172*  --   --  214*   *  --   --  186*   *  --   --  170*   MG  --  2.0 1.8 1.8   PHOS  --  3.1 1.8* 2.1*       All pertinent labs within the past 24 hours have been reviewed.    Significant Imaging:  I have reviewed all pertinent imaging results/findings within the past 24 hours.      ABG  Recent Labs   Lab 11/02/23  1306   PH 7.411   PO2 114*   PCO2 30.7*   HCO3 19.5*   BE -5*     Assessment/Plan:     Neuro  Anoxic brain injury  Currently intubated with GCS 3T off all sedation. No purposeful movements. Will attempt to contact family for GOC discussions. CT Head negative for acute change. EEG ordered, showing diffuse/severe encephalopathy. TSH wnl. Tox screen negative. Care to focus on comfort measures.   -Will trend hypoglycemia q6h.  -D5 as indicated        Cardiac/Vascular  * Cardiac arrest  Out-of-hospital cardiac arrest in the setting of suspected hypoxia. Pt had CBG 32 & given 2 amps of D50 total. Also received 5 rounds of epi, 1 bicarb, 1g calcium , 450mL normal saline, and 1 push dose epi per ems. Arrives to ER normal sinus 62 & normotensive. EKG notable for irregularly irregular bradycardia and global low voltage. Bedside echo in the ED notable for diffuse pericardial effusion without tamponade physiology, was also hypothermic on arrival at 88.7. Diffuse watery/bloody diarrhea in ED.    -Broad differential including tachy dysrhythmia, obstructive pathology, endocrinopathy, tox,    -Formal Echo pending, BCx/UCx pending  -On broad spectrum ABx.   -CT Head, CTA chest, A/P pending  -Initial trop 0.04, will trend and add serial EKGs but low suspicion for active ischemic event at this time.    -targeted temperature management in the setting of hypothermia   -Remains off sedation with no purposeful movement    Pericardial effusion  Cardiology consulted for moderate pericardial effusion. No indication of tamponade. No intervention required  - Hold GDMT held in setting of hypotension  - Continue vasopressors as indicated    Chronic combined systolic and diastolic heart failure  TTE 9/2023: Mildly reduced systolic function with a visually estimated ejection fraction of 40 - 45%. There is indeterminate diastolic function.  - In the setting of cardiac arrest with ROSC, willl hold GDMT  - Repeat Echo ordered        Renal/  Metabolic acidosis, increased anion gap  HAGMA with Anion gap of 20 and serum bicarb of 12 on admission with concurrent respiratory acidosis (measured pCP2=38 and expected of 28-32), as well as a non-anion gap metabolic acidosis. Suspect HAGMA secondary to his lactic acidosis, and NAGMA due to hsi renal failure. Nephrology consulted for acute renal failure.  --12 hours SLED then 12 hours SCUF  --Monitor vent settings for respiratory acidosis.   --Discontinue RRT as indicated by [facility]    ESRD (end stage renal disease)  HD through LUE AV fistula - per chart review last HD on 10/30. Current chemistries stable. BUN/Cr appears at baseline. Nephrology consulted.  -Trend CMPs   -Strict I&Os  -LIJ trialysis placed for CRRT   -Avoid nephrotoxic medications    Palliative Care  Goals of care, counseling/discussion  Pt currently DNR. Pt's mother and son are aware of severity of Mr Lewis's health. [facility] discussion held with medical team and Pt's son. Focus on comfort measures and avoid invasive procedures. This included RRT and unnecessary labs. Extubation to take place after all the family has had time to visit pt.  -- Advance to comfort care per family  -- Plan future extubation      Other  Acute hypoxic respiratory failure  Patient with Hypoxic Respiratory failure which is Acute.  he is not on home  oxygen. Supplemental oxygen was provided and noted- Vent Mode: A/C  Oxygen Concentration (%):  [] 60  Resp Rate Total:  [24 br/min-36 br/min] 28 br/min  Vt Set:  [400 mL] 400 mL  PEEP/CPAP:  [5 cmH20] 5 cmH20  Mean Airway Pressure:  [8.8 tbC01-97 cmH20] 9.6 cmH20    .   Signs/symptoms of respiratory failure include- respiratory distress. Contributing diagnoses includes - COPD Labs and images were reviewed. Patient Has recent ABG, which has been reviewed. Will treat underlying causes and adjust management of respiratory failure as follows    COPD on QHS BiPAP and was found unresponsive off BiPAP prior to ACLS start. Presented intubated. Vent settings as above.   -Wean vent as tolerated  -ABG PRN  -CXR with R interstitial opacifications concerning for possible aspiration. CT PE study pending for further characterization          Critical Care Daily Checklist:    A: Awake: RASS Goal/Actual Goal: RASS Goal: 0-->alert and calm  Actual:     B: Spontaneous Breathing Trial Performed? Spon. Breathing Trial Initiated?: Not initiated (11/03/23 0409)   C: SAT & SBT Coordinated?  Yes                      D: Delirium: CAM-ICU Overall CAM-ICU: Positive   E: Early Mobility Performed? No   F: Feeding Goal: Goals: Meet % EEN, EPN by RD f/u date  Status: Nutrition Goal Status: new   Current Diet Order   Procedures    Diet NPO      AS: Analgesia/Sedation Off   T: Thromboembolic Prophylaxis Thrombocytopenic   H: HOB > 300 Yes   U: Stress Ulcer Prophylaxis (if needed) N/A   G: Glucose Control SSI   B: Bowel Function Stool Occurrence: 1   I: Indwelling Catheter (Lines & Dennis) Necessity Trialysis, PIV, rectal tube, dennis   D: De-escalation of Antimicrobials/Pharmacotherapies Dapto    Plan for the day/ETD GOC/Comfort    Code Status:  Family/Goals of Care: DNR  Ongoing       Critical secondary to Patient has a condition that poses threat to life and bodily function: Severe Respiratory Distress      Critical care was time spent  personally by me on the following activities: development of treatment plan with patient or surrogate and bedside caregivers, discussions with consultants, evaluation of patient's response to treatment, examination of patient, ordering and performing treatments and interventions, ordering and review of laboratory studies, ordering and review of radiographic studies, pulse oximetry, re-evaluation of patient's condition. This critical care time did not overlap with that of any other provider or involve time for any procedures.     Tae Marquez MD  Critical Care Medicine  [facility] - Cardiac Medical [facility]

## 2023-11-04 NOTE — PLAN OF CARE
MICU DAILY GOALS     Family/Goals of care/Code Status   Code Status: DNR    24H Vital Sign Range  Temp:  [95.2 °F (35.1 °C)-96.6 °F (35.9 °C)]   Pulse:  []   BP: (29)/(9)   SpO2:  [91 %-99 %]   Arterial Line BP: ()/(48-77)      Shift Events   No acute events throughout shift    AWAKE RASS: Goal - RASS Goal: 0-->alert and calm  Actual - RASS (Osorio Agitation-Sedation Scale): moderate sedation    Restraint necessity: Not necessary   BREATHE SBT: Not attempted    Coordinate A & B, analgesics/sedatives Pain: managed   SAT: Not attempted   Delirium CAM-ICU: Overall CAM-ICU: Positive   Early(intubated/ Progressive (non-intubated) Mobility MOVE Screen (INTUBATED ONLY): Not attempted    Activity: Activity Management: Patient unable to perform activities   Feeding/Nutrition Diet order: Diet/Nutrition Received: tube feeding,     Thrombus DVT prophylaxis: VTE Required Core Measure: Pharmacological prophylaxis initiated/maintained   HOB Elevation Head of Bed (HOB) Positioning: HOB at 30-45 degrees   Ulcer Prophylaxis GI: yes   Glucose control managed Glycemic Management: blood glucose monitored   Skin Skin assessed during: Q Shift Change    Sacrum intact? No  Heels intact? Yes  Surgical wound? No    [] No Altered Skin Integrity Present    []Prevention Measures Documented    [x] Altered Skin Integrity Present or Discovered   [x] LDA present /added in EPIC   [] Wound Image Taken     Wound Care Consulted? Yes    Attending Nurse:  Morelia Lagunas RN/Staff Member:  Shayla Thorpe RN   Bowel Function diarrhea    Indwelling Catheter Necessity [REMOVED]      Urethral Catheter 11/01/23 0009 Temperature probe 16 Fr.-Reason for Continuing Urinary Catheterization: Critically ill in ICU and requiring hourly monitoring of intake/output    Trialysis (Dialysis) Catheter 11/01/23 0656 left internal jugular-Line Necessity Review: CRRT/HD     De-escalation Antibiotics Yes       VS and assessment per flow sheet, patient  progressing towards goals as tolerated, plan of care reviewed with [unfilled], all concerns addressed, will continue to monitor.

## 2023-11-04 NOTE — PLAN OF CARE
MICU DAILY GOALS     Family/Goals of care/Code Status   Code Status: DNR    24H Vital Sign Range  Temp:  [95 °F (35 °C)-96.6 °F (35.9 °C)]   Pulse:  []   Resp:  [16-22]   BP: (29)/(9)   SpO2:  [85 %-100 %]   Arterial Line BP: ()/(48-81)      Shift Events   Temp maintained at 36C per MD order; off of levophed since 4PM with stable BP with MAP>65.    AWAKE RASS: Goal - RASS Goal: 0-->alert and calm  Actual - RASS (Osorio Agitation-Sedation Scale): moderate sedation    Restraint necessity: Not necessary   BREATHE SBT: Intubated   Coordinate A & B, analgesics/sedatives Pain: managed   SAT: Fail   Delirium CAM-ICU: Overall CAM-ICU: Positive   Early(intubated/ Progressive (non-intubated) Mobility MOVE Screen (INTUBATED ONLY): Fail    Activity: Activity Management: Patient unable to perform activities   Feeding/Nutrition Diet order: Diet/Nutrition Received: tube feeding, Specialty Diet/Nutrition Received: renal diet   Thrombus DVT prophylaxis: VTE Required Core Measure: Pharmacological prophylaxis initiated/maintained   HOB Elevation Head of Bed (HOB) Positioning: HOB elevated   Ulcer Prophylaxis GI: yes   Glucose control managed Glycemic Management: blood glucose monitored   Skin Skin assessed during: Daily Assessment    Sacrum intact? No  Heels intact? Yes  Surgical wound? No    [] No Altered Skin Integrity Present    []Prevention Measures Documented    [] Altered Skin Integrity Present or Discovered   [] LDA present /added in EPIC   [] Wound Image Taken     Wound Care Consulted? Yes    Attending Nurse:  Evin Lagunas RN/Staff Member:  Shayla Thorpe RN   Bowel Function diarrhea    Indwelling Catheter Necessity [REMOVED]      Urethral Catheter 11/01/23 0009 Temperature probe 16 Fr.-Reason for Continuing Urinary Catheterization: Critically ill in ICU and requiring hourly monitoring of intake/output    Trialysis (Dialysis) Catheter 11/01/23 0656 left internal jugular-Line Necessity Review: CRRT/HD      De-escalation Antibiotics Yes       VS and assessment per flow sheet, patient progressing towards goals as tolerated, plan of care reviewed with family, all concerns addressed, will continue to monitor.

## 2023-11-04 NOTE — ASSESSMENT & PLAN NOTE
Currently intubated with GCS 3T off all sedation. No purposeful movements. Will attempt to contact family for GOC discussions. CT Head negative for acute change. EEG ordered, showing diffuse/severe encephalopathy. TSH wnl. Tox screen negative. Care to focus on comfort measures.   -Will trend hypoglycemia q6h.  -D5 as indicated

## 2023-11-05 PROBLEM — R65.21 SEPTIC SHOCK: Status: ACTIVE | Noted: 2022-10-12

## 2023-11-05 PROBLEM — A41.9 SEPSIS: Status: ACTIVE | Noted: 2023-01-01

## 2023-11-05 NOTE — ASSESSMENT & PLAN NOTE
Patient with Hypoxic Respiratory failure which is Acute.  he is not on home oxygen. Supplemental oxygen was provided and noted- Vent Mode: A/C  Oxygen Concentration (%):  [30-40] 40  Resp Rate Total:  [18 br/min-29 br/min] 18 br/min  Vt Set:  [400 mL] 400 mL  PEEP/CPAP:  [5 cmH20] 5 cmH20  Mean Airway Pressure:  [8.8 tuP04-20 cmH20] 8.8 cmH20    .   Signs/symptoms of respiratory failure include- respiratory distress. Contributing diagnoses includes - COPD Labs and images were reviewed. Patient Has recent ABG, which has been reviewed. Will treat underlying causes and adjust management of respiratory failure as follows    COPD on QHS BiPAP and was found unresponsive off BiPAP prior to ACLS start. Presented intubated. Vent settings as above.   -Wean vent as tolerated  -ABG PRN  -CXR with R interstitial opacifications concerning for possible aspiration. CT PE study pending for further characterization

## 2023-11-05 NOTE — CARE UPDATE
Discussed GOC with Pt's two sons, Cosme Lewis Jr and Rojas Lewis via [employer]. Care to remain comfort focused without invasive measures such as RRT. Family would like to consider/pursue extubation to comfort care on 11/06/23. Rojas Lewis to defer decision to Cosme Lewis , who [place] of state. Critical Care physician requested to call Cosme Lewis Jr on 11/06/23 prior to extubation.    Tae Marquez MD  Internal Medicine, PGY-3  [facility]

## 2023-11-05 NOTE — PLAN OF CARE
MICU DAILY GOALS     Family/Goals of care/Code Status   Code Status: DNR    24H Vital Sign Range  Temp:  [94.5 °F (34.7 °C)-95.9 °F (35.5 °C)]   Pulse:  [64-79]   Resp:  [16-23]   BP: ()/(38-91)   SpO2:  [91 %-98 %]   Arterial Line BP: ()/(60-80)      Shift Events       AWAKE RASS: Goal - RASS Goal: 0-->alert and calm  Actual - RASS (Osorio Agitation-Sedation Scale): moderate sedation    Restraint necessity: Not necessary   BREATHE SBT: Fail    Coordinate A & B, analgesics/sedatives Pain: managed   SAT: NA   Delirium CAM-ICU: Overall CAM-ICU: Positive   Early(intubated/ Progressive (non-intubated) Mobility MOVE Screen (INTUBATED ONLY): Fail    Activity: Activity Management: Patient unable to perform activities   Feeding/Nutrition Diet order: Diet/Nutrition Received: NPO, Specialty Diet/Nutrition Received: renal diet   Thrombus DVT prophylaxis: VTE Required Core Measure: Pharmacological prophylaxis initiated/maintained   HOB Elevation Head of Bed (HOB) Positioning: HOB elevated   Ulcer Prophylaxis GI: yes   Glucose control managed Glycemic Management: blood glucose monitored   Skin Skin assessed during: Daily Assessment    Sacrum intact? No  Heels intact? Yes  Surgical wound? No    [] No Altered Skin Integrity Present    []Prevention Measures Documented    [] Altered Skin Integrity Present or Discovered   [] LDA present /added in EPIC   [] Wound Image Taken     Wound Care Consulted? Yes    Attending Nurse:  Evin Lagunas RN/Staff Member:  Yes   Bowel Function no issues    Indwelling Catheter Necessity [REMOVED]      Urethral Catheter 11/01/23 0009 Temperature probe 16 Fr.-Reason for Continuing Urinary Catheterization: Critically ill in ICU and requiring hourly monitoring of intake/output    Trialysis (Dialysis) Catheter 11/01/23 0656 left internal jugular-Line Necessity Review: Hemodynamic instability     De-escalation Antibiotics Yes       VS and assessment per flow sheet, patient progressing towards  goals as tolerated, plan of care reviewed with family, all concerns addressed, will continue to monitor.

## 2023-11-05 NOTE — ASSESSMENT & PLAN NOTE
"This patient does have evidence of infective focus  My overall impression is septic shock due to MAP < 65.  Source: Unknown  Antibiotics given-   Antibiotics (72h ago, onward)      Start     Stop Route Frequency Ordered    11/05/23 0715  linezolid 600 mg/300 mL IVPB 600 mg         11/12/23 0714 IV Every 12 hours (non-standard times) 11/05/23 0605    11/01/23 1215  mupirocin 2 % ointment         11/06/23 0859 Nasl 2 times daily 11/01/23 1110          Latest lactate reviewed-  No results for input(s): "LACTATE" in the last 72 hours.  Organ dysfunction indicated by Acute kidney injury, Acute respiratory failure, Encephalopathy, and Thrombocytopenia     Fluid challenge Ideal Body Weight- The patient's ideal body weight is Ideal body weight: 70.7 kg (155 lb 13.8 oz) which will be used to calculate fluid bolus of 30 ml/kg for treatment of septic shock.      Post- resuscitation assessment Yes Perfusion exam was performed within 6 hours of septic shock presentation after bolus shows Adequate tissue perfusion assessed by non-invasive monitoring       Will Start Pressors- Levophed for MAP of 65  Source control achieved by: antibiotic coverage  "

## 2023-11-05 NOTE — SUBJECTIVE & OBJECTIVE
Interval History/Significant Events: No acute events overnight. Pt remianed off sedation on mechanical ventilation. Off vasopressors. GOC to be discussed with family.     Review of Systems   Unable to perform ROS: Intubated     Objective:     Vital Signs (Most Recent):  Temp: (Abnormal) 94.6 °F (34.8 °C) (11/05/23 1233)  Pulse: 65 (11/05/23 1233)  Resp: (Abnormal) 22 (11/05/23 1101)  BP: (Abnormal) 144/82 (11/05/23 1233)  SpO2: 98 % (11/05/23 1233) Vital Signs (24h Range):  Temp:  [94.5 °F (34.7 °C)-95.9 °F (35.5 °C)] 94.6 °F (34.8 °C)  Pulse:  [64-93] 65  Resp:  [16-23] 22  SpO2:  [91 %-98 %] 98 %  BP: ()/(38-83) 144/82  Arterial Line BP: ()/(60-79) 108/69   Weight: 66.2 kg (145 lb 15.1 oz)  Body mass index is 21.55 kg/m².      Intake/Output Summary (Last 24 hours) at 11/5/2023 1240  Last data filed at 11/5/2023 1103  Gross per 24 hour   Intake 576.26 ml   Output 150 ml   Net 426.26 ml          Physical Exam  Vitals and nursing note reviewed.   Constitutional:       Interventions: He is intubated.      Comments: Intubated, off sedation, minimally responsive    HENT:      Head: Normocephalic and atraumatic.      Right Ear: External ear normal.      Left Ear: External ear normal.      Nose: Nose normal.      Mouth/Throat:      Comments: ETT in place   Eyes:      Comments: Pupils fixed and dilated    Cardiovascular:      Rate and Rhythm: Normal rate and regular rhythm.      Heart sounds: Normal heart sounds.   Pulmonary:      Effort: Pulmonary effort is normal. He is intubated.      Breath sounds: Normal breath sounds.   Abdominal:      Tenderness: There is no abdominal tenderness. There is no guarding or rebound.   Musculoskeletal:         General: No deformity or signs of injury.   Skin:     General: Skin is dry.      Findings: No rash.   Neurological:      GCS: GCS eye subscore is 1. GCS verbal subscore is 1. GCS motor subscore is 1.            Vents:  Vent Mode: A/C (11/05/23 1233)  Ventilator  Initiated: Yes (11/01/23 0143)  Set Rate: 16 BPM (11/05/23 1233)  Vt Set: 400 mL (11/05/23 1233)  PEEP/CPAP: 5 cmH20 (11/05/23 1233)  Oxygen Concentration (%): 40 (11/05/23 1233)  Peak Airway Pressure: 21 cmH20 (11/05/23 1233)  Plateau Pressure: 20 cmH20 (11/05/23 1233)  Total Ve: 7.78 L/m (11/05/23 1233)  Negative Inspiratory Force (cm H2O): 0 (11/05/23 1233)  F/VT Ratio<105 (RSBI): (Abnormal) 68.05 (11/05/23 0347)  Lines/Drains/Airways       Central Venous Catheter Line       Name Duration    Trialysis (Dialysis) Catheter 11/01/23 0656 left internal jugular 4 days              Drain       Name Duration         NG/OG Tube 11/01/23 0010 Center mouth 4 days         Rectal Tube 11/01/23 0330 fecal management system 4 days              Airway       Name Duration         Airway - Non-Surgical 10/31/23 2337 Endotracheal Tube 4 days              Arterial Line       Name Duration    Arterial Line 11/01/23 1240 Right Radial 4 days              Peripheral Intravenous Line       Name Duration         Hemodialysis AV Fistula Left upper arm No Data         Peripheral IV - Single Lumen 11/01/23 0200 18 G Anterior;Right Upper Arm 4 days         Peripheral IV - Single Lumen 11/01/23 0200 20 G Anterior;Proximal;Right Forearm 4 days                  Significant Labs:    CBC/Anemia Profile:  Recent Labs   Lab 11/05/23  0308   WBC 5.72   HGB 9.4*   HCT 28.7*   PLT 31*   MCV 88   RDW 17.7*        Chemistries:  Recent Labs   Lab 11/05/23  0308   *   K 5.3*      CO2 18*   BUN 65*   CREATININE 2.0*   CALCIUM 7.5*   ALBUMIN 1.8*   PROT 5.5*   BILITOT 1.8*   ALKPHOS 245*   *   AST 75*   MG 2.1       All pertinent labs within the past 24 hours have been reviewed.    Significant Imaging:  I have reviewed all pertinent imaging results/findings within the past 24 hours.

## 2023-11-05 NOTE — NURSING
Nurses Note -- 4 Eyes      11/5/2023   6:00 AM      Skin assessed during: Q Shift Change      [] No Altered Skin Integrity Present    [x]Prevention Measures Documented      [x] Yes- Altered Skin Integrity Present or Discovered   [x] LDA Added if Not in Epic (Describe Wound)   [x] New Altered Skin Integrity was Present on Admit and Documented in LDA   [x] Wound Image Taken    Wound Care Consulted? Yes    Attending Nurse:  Licha Son RN    Second RN/Staff Member:  Yes        Evin Mcfarland RN

## 2023-11-05 NOTE — PROGRESS NOTES
[facility] - Cardiac Medical ICU  Critical Care Medicine  Progress Note    Patient Name: Cosme Lewis  MRN: [id number]  Admission Date: 10/31/2023  Hospital Length of Stay: 4 days  Code Status: DNR  Attending Provider: Jhonny Lezama MD  Primary Care Provider: Eliecer Ohara III, MD   Principal Problem: Cardiac arrest    Subjective:     HPI:  58 year old male with medical history significant for COPD, HFrEF (45% and DD), ESRD on dialysis (MWF), HTN, DM who was discharged yesterday after long admission including ICU stay for acute hypoxemic respiratory failure of multfactorial etiology presenting to emergency room after out-of-hospital cardiac arrest where ROSC was achieved. Per chart review, patient was last known normal at 9:10 pm. He was found to be unresponsive an hour later by nursing home staff and CPR was started.  He appeared to have removed his Nightly BiPAP. Pink frothy sputum was noted on arrival per EMS and he was also found to be hypoglycemic with BG of 32. EMS achieved ROSC after about 35 minutes. He received 2 amps of D50, 5 rounds of epi, 1 bicarb, 1g calcium , 450mL normal saline, and 1 push dose epi.    Patient arrived to the emergency room intubated. Large bloody bowel movement was noted. Hypothermic to 90.5 F, Bradycardic but normotensive. CT pan scan pending. Critical care medicine was consulted for lower GI bleed, lactic acidosis and post-cardiac arrest management.     Hospital/ICU Course:  Pt was admitted to Critical Care for acute hypoxic respiratory failure 2/t cardiac arrest s/p ROSC. He remains intubated on mechanical ventilation. Minimal responsiveness concerning for ischemic brain injury. CT Head was negative for acute change. EEG pending. On levo to maintain MAPs >60. Multiple efforts made to contact pt's family without success. VM left. Code status ordered as DNR with two physician consent due to poor prognosis. Palliative Care consulted for assistance with contacting family and  discussing GOC. Pt mom and son successfully contacted and informed of Mr Lewis's acute state of health. Cosme Lewis Jr was met by the medical team and GOC were discussed. He stated that he would prefer to discontinue invasive treatment and focus on comfort measures. This included RRT and unnecessary labs. Family would like to be given the chance to visit the pt at bedside prior to extubation. Bcx positive for VRE, Dapto discontinued and IV linezolid started.    Interval History/Significant Events: No acute events overnight. Pt remianed off sedation on mechanical ventilation. Off vasopressors. GOC to be discussed with family.     Review of Systems   Unable to perform ROS: Intubated     Objective:     Vital Signs (Most Recent):  Temp: (Abnormal) 94.6 °F (34.8 °C) (11/05/23 1233)  Pulse: 65 (11/05/23 1233)  Resp: (Abnormal) 22 (11/05/23 1101)  BP: (Abnormal) 144/82 (11/05/23 1233)  SpO2: 98 % (11/05/23 1233) Vital Signs (24h Range):  Temp:  [94.5 °F (34.7 °C)-95.9 °F (35.5 °C)] 94.6 °F (34.8 °C)  Pulse:  [64-93] 65  Resp:  [16-23] 22  SpO2:  [91 %-98 %] 98 %  BP: ()/(38-83) 144/82  Arterial Line BP: ()/(60-79) 108/69   Weight: 66.2 kg (145 lb 15.1 oz)  Body mass index is 21.55 kg/m².      Intake/Output Summary (Last 24 hours) at 11/5/2023 1240  Last data filed at 11/5/2023 1103  Gross per 24 hour   Intake 576.26 ml   Output 150 ml   Net 426.26 ml          Physical Exam  Vitals and nursing note reviewed.   Constitutional:       Interventions: He is intubated.      Comments: Intubated, off sedation, minimally responsive    HENT:      Head: Normocephalic and atraumatic.      Right Ear: External ear normal.      Left Ear: External ear normal.      Nose: Nose normal.      Mouth/Throat:      Comments: ETT in place   Eyes:      Comments: Pupils fixed and dilated    Cardiovascular:      Rate and Rhythm: Normal rate and regular rhythm.      Heart sounds: Normal heart sounds.   Pulmonary:      Effort: Pulmonary effort  is normal. He is intubated.      Breath sounds: Normal breath sounds.   Abdominal:      Tenderness: There is no abdominal tenderness. There is no guarding or rebound.   Musculoskeletal:         General: No deformity or signs of injury.   Skin:     General: Skin is dry.      Findings: No rash.   Neurological:      GCS: GCS eye subscore is 1. GCS verbal subscore is 1. GCS motor subscore is 1.            Vents:  Vent Mode: A/C (11/05/23 1233)  Ventilator Initiated: Yes (11/01/23 0143)  Set Rate: 16 BPM (11/05/23 1233)  Vt Set: 400 mL (11/05/23 1233)  PEEP/CPAP: 5 cmH20 (11/05/23 1233)  Oxygen Concentration (%): 40 (11/05/23 1233)  Peak Airway Pressure: 21 cmH20 (11/05/23 1233)  Plateau Pressure: 20 cmH20 (11/05/23 1233)  Total Ve: 7.78 L/m (11/05/23 1233)  Negative Inspiratory Force (cm H2O): 0 (11/05/23 1233)  F/VT Ratio<105 (RSBI): (Abnormal) 68.05 (11/05/23 0347)  Lines/Drains/Airways       Central Venous Catheter Line       Name Duration    Trialysis (Dialysis) Catheter 11/01/23 0656 left internal jugular 4 days              Drain       Name Duration         NG/OG Tube 11/01/23 0010 Center mouth 4 days         Rectal Tube 11/01/23 0330 fecal management system 4 days              Airway       Name Duration         Airway - Non-Surgical 10/31/23 2337 Endotracheal Tube 4 days              Arterial Line       Name Duration    Arterial Line 11/01/23 1240 Right Radial 4 days              Peripheral Intravenous Line       Name Duration         Hemodialysis AV Fistula Left upper arm No Data         Peripheral IV - Single Lumen 11/01/23 0200 18 G Anterior;Right Upper Arm 4 days         Peripheral IV - Single Lumen 11/01/23 0200 20 G Anterior;Proximal;Right Forearm 4 days                  Significant Labs:    CBC/Anemia Profile:  Recent Labs   Lab 11/05/23  0308   WBC 5.72   HGB 9.4*   HCT 28.7*   PLT 31*   MCV 88   RDW 17.7*        Chemistries:  Recent Labs   Lab 11/05/23  0308   *   K 5.3*      CO2 18*   BUN  65*   CREATININE 2.0*   CALCIUM 7.5*   ALBUMIN 1.8*   PROT 5.5*   BILITOT 1.8*   ALKPHOS 245*   *   AST 75*   MG 2.1       All pertinent labs within the past 24 hours have been reviewed.    Significant Imaging:  I have reviewed all pertinent imaging results/findings within the past 24 hours.    ABG  Recent Labs   Lab 11/02/23  1306   PH 7.411   PO2 114*   PCO2 30.7*   HCO3 19.5*   BE -5*     Assessment/Plan:     Neuro  Anoxic brain injury  Currently intubated with GCS 3T off all sedation. No purposeful movements. Will attempt to contact family for GOC discussions. CT Head negative for acute change. EEG ordered, showing diffuse/severe encephalopathy. TSH wnl. Tox screen negative. Care to focus on comfort measures.   -Will trend hypoglycemia q6h.  -D5 as indicated        Cardiac/Vascular  * Cardiac arrest  Out-of-hospital cardiac arrest in the setting of suspected hypoxia. Pt had CBG 32 & given 2 amps of D50 total. Also received 5 rounds of epi, 1 bicarb, 1g calcium , 450mL normal saline, and 1 push dose epi per ems. Arrives to ER normal sinus 62 & normotensive. EKG notable for irregularly irregular bradycardia and global low voltage. Bedside echo in the ED notable for diffuse pericardial effusion without tamponade physiology, was also hypothermic on arrival at 88.7. Diffuse watery/bloody diarrhea in ED.    -Broad differential including tachy dysrhythmia, obstructive pathology, endocrinopathy, tox,    -Formal Echo pending, BCx/UCx pending  -On broad spectrum ABx.   -CT Head, CTA chest, A/P pending  -Initial trop 0.04, will trend and add serial EKGs but low suspicion for active ischemic event at this time.   -targeted temperature management in the setting of hypothermia   -Remains off sedation with no purposeful movement    Pericardial effusion  Cardiology consulted for moderate pericardial effusion. No indication of tamponade. No intervention required  - Hold GDMT held in setting of hypotension  - Continue  "vasopressors as indicated    Chronic combined systolic and diastolic heart failure  TTE 9/2023: Mildly reduced systolic function with a visually estimated ejection fraction of 40 - 45%. There is indeterminate diastolic function.  - In the setting of cardiac arrest with ROSC, willl hold GDMT  - Repeat Echo ordered        Renal/  Metabolic acidosis, increased anion gap  HAGMA with Anion gap of 20 and serum bicarb of 12 on admission with concurrent respiratory acidosis (measured pCP2=38 and expected of 28-32), as well as a non-anion gap metabolic acidosis. Suspect HAGMA secondary to his lactic acidosis, and NAGMA due to hsi renal failure. Nephrology consulted for acute renal failure.  --12 hours SLED then 12 hours SCUF  --Monitor vent settings for respiratory acidosis.   --Discontinue RRT as indicated by [facility]    ESRD (end stage renal disease)  HD through LUE AV fistula - per chart review last HD on 10/30. Current chemistries stable. BUN/Cr appears at baseline. Nephrology consulted.  -Trend CMPs   -Strict I&Os  -LIJ trialysis placed for CRRT   -Avoid nephrotoxic medications    ID  Sepsis  This patient does have evidence of infective focus  My overall impression is septic shock due to MAP < 65.  Source: Unknown  Antibiotics given-   Antibiotics (72h ago, onward)      Start     Stop Route Frequency Ordered    11/05/23 0715  linezolid 600 mg/300 mL IVPB 600 mg         11/12/23 0714 IV Every 12 hours (non-standard times) 11/05/23 0605    11/01/23 1215  mupirocin 2 % ointment         11/06/23 0859 Nasl 2 times daily 11/01/23 1110          Latest lactate reviewed-  No results for input(s): "LACTATE" in the last 72 hours.  Organ dysfunction indicated by Acute kidney injury, Acute respiratory failure, Encephalopathy, and Thrombocytopenia     Fluid challenge Ideal Body Weight- The patient's ideal body weight is Ideal body weight: 70.7 kg (155 lb 13.8 oz) which will be used to calculate fluid bolus of 30 ml/kg for treatment of " septic shock.      Post- resuscitation assessment Yes Perfusion exam was performed within 6 hours of septic shock presentation after bolus shows Adequate tissue perfusion assessed by non-invasive monitoring       Will Start Pressors- Levophed for MAP of 65  Source control achieved by: antibiotic coverage    Palliative Care  Goals of care, counseling/discussion  Pt currently DNR. Pt's mother and son are aware of severity of Mr Lewis's health. GOC discussion held with medical team and Pt's son. Focus on comfort measures and avoid invasive procedures. This included RRT and unnecessary labs. Extubation to take place after all the family has had time to visit pt.  -- Advance to comfort care per family  -- Plan future extubation      Other  Acute hypoxic respiratory failure  Patient with Hypoxic Respiratory failure which is Acute.  he is not on home oxygen. Supplemental oxygen was provided and noted- Vent Mode: A/C  Oxygen Concentration (%):  [30-40] 40  Resp Rate Total:  [18 br/min-29 br/min] 18 br/min  Vt Set:  [400 mL] 400 mL  PEEP/CPAP:  [5 cmH20] 5 cmH20  Mean Airway Pressure:  [8.8 zxP35-35 cmH20] 8.8 cmH20    .   Signs/symptoms of respiratory failure include- respiratory distress. Contributing diagnoses includes - COPD Labs and images were reviewed. Patient Has recent ABG, which has been reviewed. Will treat underlying causes and adjust management of respiratory failure as follows    COPD on QHS BiPAP and was found unresponsive off BiPAP prior to ACLS start. Presented intubated. Vent settings as above.   -Wean vent as tolerated  -ABG PRN  -CXR with R interstitial opacifications concerning for possible aspiration. CT PE study pending for further characterization          Critical Care Daily Checklist:    A: Awake: RASS Goal/Actual Goal: RASS Goal: 0-->alert and calm  Actual:     B: Spontaneous Breathing Trial Performed? Spon. Breathing Trial Initiated?: Not initiated (11/03/23 1245)   C: SAT & SBT Coordinated?  SAT &  SBT daily                      D: Delirium: CAM-ICU Overall CAM-ICU: Positive   E: Early Mobility Performed? Yes   F: Feeding Goal: Goals: Meet % EEN, EPN by RD f/u date  Status: Nutrition Goal Status: new   Current Diet Order   Procedures    Diet NPO      AS: Analgesia/Sedation None   T: Thromboembolic Prophylaxis Thrombocytopenia   H: HOB > 300 Yes   U: Stress Ulcer Prophylaxis (if needed) Famotidine   G: Glucose Control SSI and D10   B: Bowel Function Stool Occurrence: 1   I: Indwelling Catheter (Lines & Dennis) Necessity RIJ, rectal tube, PIV, dennis   D: De-escalation of Antimicrobials/Pharmacotherapies Linezolid    Plan for the day/ETD C    Code Status:  Family/Goals of Care: DNR  Ongoing       Critical secondary to Patient has a condition that poses threat to life and bodily function: Severe Respiratory Distress      Critical care was time spent personally by me on the following activities: development of treatment plan with patient or surrogate and bedside caregivers, discussions with consultants, evaluation of patient's response to treatment, examination of patient, ordering and performing treatments and interventions, ordering and review of laboratory studies, ordering and review of radiographic studies, pulse oximetry, re-evaluation of patient's condition. This critical care time did not overlap with that of any other provider or involve time for any procedures.     Tae Marquez MD  Critical Care Medicine  [facility] - Cardiac Medical ICU

## 2023-11-06 PROBLEM — Z51.5 COMFORT MEASURES ONLY STATUS: Status: ACTIVE | Noted: 2023-01-01

## 2023-11-06 NOTE — CARE UPDATE
Called to bedside by RN for acute desaturation event. At arrival to bedside, patient satting in the 20s on 100% FiO2 and PEEP of 10 despite aggressive suction. Patient gradually improved while team at bedside though continually desatting. Patient's brother Rojas Lewis, son Cosme Lewis Jr, and Kassandrababatunde Lewis all notified via phone. I advised them that patient was approaching the end and would soon pass. I advised we would transition to full comfort care measures to insure patient was comfortable and pain-free at the end. All family members expressed understanding of the situation and agreement with comfort care measures. Patient subsequently palliatively extubated. Comfort cares orders placed. Prn ativan and morphine available for comfort and agitation. Anticipate patient will pass soon.    Carlos Mitchell MD  Internal Medicine PGY-III  Critical Care Medicine  [facility]

## 2023-11-06 NOTE — PLAN OF CARE
[facility] - Cardiac Medical ICU  Discharge Final Note    Primary Care Provider: Eliecer Ohara III, MD    Expected Discharge Date: 2023    Date of Death: 2023  Time of Death: 6:40 am  Cause of Death: Multisystem Organ Failure    Final Discharge Note (most recent)       Final Note - 23 1522          Final Note    Assessment Type Final Discharge Note     Anticipated Discharge Disposition                      Important Message from Medicare      Jayesh Cortes LMSW  [facility]  [phone]

## 2023-11-06 NOTE — NURSING
Nurses Note -- 4 Eyes      11/6/2023   6:10 AM      Skin assessed during: Q Shift Change      [] No Altered Skin Integrity Present    []Prevention Measures Documented      [x] Yes- Altered Skin Integrity Present or Discovered   [] LDA Added if Not in Epic (Describe Wound)   [] New Altered Skin Integrity was Present on Admit and Documented in LDA   [] Wound Image Taken    Wound Care Consulted? Yes    Attending Nurse:  Licha Lagunas RN/Staff Member:  Yes      Evin

## 2023-11-06 NOTE — NURSING
Pt O2 sats in 20s; MD called to bedside. Comfort measure orders placed by MD after conversations with family. Pt extubated to room air. PRN meds administered for dyspnea.

## 2023-11-06 NOTE — SIGNIFICANT EVENT
Critical Care Medicine                                                 Death Note      Called to bedside by patient's nurse. Nursing supervisor notified. Family not present at bedside.  has been called and is also at bedside.    Patient is not responding to verbal or tactile stimuli. Patient does not have a papillary or corneal reflex. His pupils are fixed and dilated. No heart or breath sounds on auscultation. No respirations. No palpable pulses.     Time of death: 11/6/23 6:40am     Cause of Death: Multisystem organ failure    Carlos Mitchell MD  [facility] email: [email or web address]

## 2023-11-06 NOTE — ASSESSMENT & PLAN NOTE
Patient with recent echo at OSH with EF > 50%. Echo with EF on 40% during this admission. Possibly 2/2 acute illness. Will need cardiology follow up outpatient for possible evaluation.    Echo:   Severe left atrial enlargement.   The left ventricle is normal in size with concentric hypertrophy and mildly decreased systolic function.   The estimated ejection fraction is 40%.   The left ventricular global longitudinal strain is -12%.   Grade II left ventricular diastolic dysfunction.   Mild right ventricular enlargement with mildly reduced right ventricular systolic function.   Mild aortic regurgitation.   Mild mitral regurgitation.   Mild tricuspid regurgitation.   Elevated central venous pressure (15 mmHg).   There is pulmonary hypertension. The estimated PA systolic pressure is 65 mmHg.   Small circumferential pericardial effusion.   There is a left pleural effusion.   Mild right atrial enlargement.        - Outpatient Cardiology follow up   well developed, well nourished , in no acute distress , ambulating without difficulty , normal communication ability

## 2023-11-15 NOTE — PHYSICIAN QUERY
PT Name: Cosme Lewis  MR #: [id number]     DOCUMENTATION CLARIFICATION     CDS/: Juan Manuel Brown Jr, RN CCDS              Contact information:[email or web address]   This form is a permanent document in the medical record.    Query Date: November 15, 2023    By submitting this query, we are merely seeking further clarification of documentation.  Please utilize your independent clinical judgment when addressing the question(s) below.    The Medical Record contains the following:   Indicator Supporting Clinical Findings Location in Medical Record   x Kidney (Renal) Insufficiency Renal/  ESRD (end stage renal disease)  Patient is MWF hemodialyisis via LUE AVF, last underwent dialysis on Monday 10/30. Current labs on admission showed acidosis, but otherwise consistent with ESRD.     ESRD on iHD MWF  [facility]  4 hours  EDW: 71 kg  UMA AVF     Recommendations  -Continue 12 hours SLED  tonight.   -continue strict I/O's  -RFP daily  -renal diet when not NPO.  -Phos elevated at this time, was WNL on 10/30. Recommend repeating phos levels.    11/3 Nephrology Progress Note   x Kidney (Renal) Failure/Injury Organ dysfunction indicated by Acute kidney injury, Acute respiratory failure, Encephalopathy, and Thrombocytopenia     11/5 Critical Care Medicine Progress Note    Nephrotoxic Agents     x BUN/Creatinine           GFR  Latest Reference Range & Units 10/31/23 23:38 11/01/23 02:00 11/01/23 14:02 11/01/23 20:04 11/02/23 04:03 11/02/23 13:06 11/02/23 14:58   BUN 6 - 20 mg/dL 43 (H) 45 (H) 49 (H) 16 15  15 17 18   Creatinine 0.5 - 1.4 mg/dL 3.7 (H) 3.6 (H) 3.7 (H) 1.5 (H) 1.4  1.4 1.2 1.4   eGFR >60 mL/min/1.73 m^2 17.9 ! 18.5 ! 17.9 ! 53.0 ! 57.5 !  57.5 ! >60.0 57.5 !   (H): Data is abnormally high  !: Data is abnormal 10/31-11/2 Labs    Urine: Casts         Eosinophils      Urine Output      Dehydration      Nausea/Vomiting     x Dialysis/CRRT ESRD (end stage renal disease)  HD through LUE AV fistula -  per chart review last HD on 10/30. Current chemistries stable. BUN/Cr appears at baseline  -Trend CMPs   -Strict I&Os  -Low threshold for emergent HD. Will need trialysis line for CRRT.    11/1 H&P    Treatment      Other         [facility] approved diagnostic criteria for acute kidney injury is based on KDIGO criteria:    An increase in serum creatinine > 0.3mg/dl within 48 hours  OR  Increase in serum creatinine to > 1.5x baseline, which is known or presumed to have occurred within the prior 7 days  OR  Urine volume <0.5 ml/kg/hr for 6 hours       The clinical guidelines noted above are only a system guideline. It does not replace the providers clinical judgment.     Due to the conflicting clinical picture, please clinically validate the diagnosis of         Acute Kidney Injury         If validated, please provide additional clinical support for the diagnosis.       [   ] Acute Kidney Injury is not confirmed and/or it has been ruled out   [  Xx  ] Acute Kidney Injury is confirmed.   Please specify clinical support (signs, symptoms, and treatment) for  the confirmed diagnosis: ____________________   [   ] Other clarification (please specify): ___________________       Please document in your progress notes daily for the duration of treatment until resolved and include in your discharge summary.    References:   [employer] Clinical Practice Guideline for Acute Kidney Injury. (2012, March). Retrieved October 21, 2020, from https://kdigo.org/wp-content/uploads/2016/10/RMICU-4248-ZWF-Guideline-English.pdf    SHANTELL Corcoran MD, KYLE Anderson MD, & CAMILA Hong MD. (1960). Renal medullary necrosis [Abstract]. The American Journal of Medicine, 29(1), 132-156. Doi:https://www.sciencedirect.com/science/article/abs/pii/5777203916104283    CAMILA Huddleston MD, & ELISABETH Ma MD, MS. (2020, June 18). Definition and staging of chronic kidney disease in adults ([id number] KYLE Hebert MD, ScD & [id number] NINA PSingh  Palmer LYLES, MSc, Eds.). Retrieved October 21, 2020, from https://www.[employer]/contents/definition-and-staging-of-chronic-kidney-disease-in-adults?search=ckd%20staging&source=search_result&selectedTitle=1~150&usage_type=default&display_rank=1     TAMIE Lee MD, FACP. (2015, Liana 15). Acute kidney injury revisited. Retrieved October 21, 2020, from https://acphospitalist.org/archives/2015/06/coding-acute-kidney-injury.htm    DAVID Calderón MD. (2019, July). Renal Cortical Necrosis. Retrieved October 21, 2020, from https://www.[facility] [facility]/professional/genitourinary-disorders/renovascular-disorders/renal-cortical-necrosis    Form No. 92859

## 2023-11-15 NOTE — PHYSICIAN QUERY
PT Name: Cosme Lewis  MR #: [id number]     DOCUMENTATION CLARIFICATION      CDS/: Juan Manuel Brown Jr, RN CCDS             Contact information:[email or web address]   This form is a permanent document in the medical record.    Query Date: November 15, 2023    By submitting this query, we are merely seeking further clarification of documentation to reflect the severity of illness of your patient. Please utilize your independent clinical judgment when addressing the question(s) below.     The Medical Record contains the following:   Indicators   Supporting Clinical Findings Location in Medical Record   x Documentation of Sepsis, Septic Shock Sepsis  This patient does have evidence of infective focus  My overall impression is septic shock due to MAP < 65.  Source: Unknown   11/5 Critical Care Progress Note   x Blood Culture Blood Culture, Routine STREPTOCOCCUS MITIS/ORALIS GROUP Abnormal     Blood Culture, Routine  Abnormal   ENTEROCOCCUS FAECALIS    Blood Culture, Routine  Abnormal   CORYNEBACTERIUM SPECIES     10/31 Labs    Respiratory Culture      Urine Culture      Other Culture     x Acute/Chronic Illness Principal Problem: Cardiac arrest   11/5 Critical Care Progress Note   x Medication/Treatment DAPTOmycin (CUBICIN) 530 mg in sodium chloride 0.9% SolP 50 mL IVPB    piperacillin-tazobactam (ZOSYN) 4.5 g in dextrose 5 % in water (D5W) 100 mL IVPB (MB+) 11/3-11/5 MAR        11/1-11/3 MAR    Other        Provider, please clarify if there is any clinical correlation between         Sepsis      and any       Identified or Suspected Organism         [   ] Sepsis due to Streptococcus    [   ] Sepsis due to Enterococcus    [   ] Sepsis due to Corynebacterium    [   ] Sepsis due to other suspected organism(Please Specify The Other Suspected Organism)______________________   [   ] Other specification (please specify): ____________________   [ xx  ] Clinically Undetermined           Please document in your progress  notes daily for the duration of treatment until resolved, and include in your discharge summary.  Form No. [id number]

## 2023-11-15 NOTE — PHYSICIAN QUERY
PT Name: Cosme Lewis  MR #: [id number]     DOCUMENTATION CLARIFICATION     CDS/: Juan Manuel Brown Jr, RN CCDS              Contact information:[email or web address]   This form is a permanent document in the medical record.     Query Date: November 15, 2023    By submitting this query, we are merely seeking further clarification of documentation.  Please utilize your independent clinical judgment when addressing the question(s) below.    The Medical Record contains the following   Indicators Supporting Clinical Findings Location in Medical Record   x Heart Failure documented Pericardial effusion- Likely secondary to RV pressure/volume overload in setting of HFpEF and ESRD.. No e/o tamponade     Chronic combined systolic and diastolic heart failure   11/1 H&P        11/2 Critical Care Progress Note   x BNP  Latest Reference Range & Units 10/31/23 23:38   BNP 0 - 99 pg/mL 1,731 (H)   (H): Data is abnormally high   10/31 Labs   x EF/Echo Summary      Left Ventricle: The left ventricle is normal in size. Increased wall thickness. There is concentric hypertrophy. Severe global hypokinesis present. There is severely reduced systolic function with a visually estimated ejection fraction of 20 - 25%. Grade I diastolic dysfunction.    Right Ventricle: Moderate right ventricular enlargement. There is hypertrophy. Right ventricle wall motion has global hypokinesis. Systolic function is severely reduced.    Tricuspid Valve: There is mild regurgitation.    IVC/SVC: Patient is ventilated, cannot use IVC diameter to estimate right atrial pressure.    Pericardium: There is a large circumferential effusion. No indication of cardiac tamponade. Evidence includes no chamber collapse. Left pleural effusion.   11/1 Echo   x Radiology findings FINDINGS:  There is an endotracheal tube which terminates approximately 8 cm proximal to the huber.  There is a nasogastric tube with the tip in the stomach and the side-port just past the  gastroesophageal junction.  The lungs are well expanded.  There are bilateral interstitial opacities with an airspace opacity in the right lung base.  There are small bilateral pleural effusions.  There is no pneumothorax.  The cardiac silhouette is enlarged.  Osseous structures are intact.     Impression:     Bilateral interstitial opacities compatible with pulmonary edema.  Focal airspace opacity in the right lung base may represent atelectasis or aspiration.   11/1 Chest X Ray (0037)   x Subjective/Objective Respiratory Conditions Pink frothy sputum was noted on arrival per EMS and he was also found to be hypoglycemic with BG of 32  11/1 H&P    Recent/Current MI      Heart Transplant, LVAD     x Edema, JVD  Right lower leg: No edema.      Left lower leg: No edema.  11/2 Nephrology Progress Note    Ascites      Diuretics/Meds      Other Treatment      Other       Heart failure is a clinical diagnosis which includes symptomatic fluid retention, elevated intracardiac pressures, and/or the inability of the heart to deliver adequate blood flow.    Heart Failure with reduced Ejection Fraction (HFrEF) or Systolic Heart Failure (loses ability to contract normally, EF is <40%)    Heart Failure with preserved Ejection Fraction (HFpEF) or Diastolic Heart Failure (stiff ventricles, does not relax properly, EF is >50%)     Heart Failure with Combined Systolic and Diastolic Failure (stiff ventricles, does not relax properly and EF is <50%)    Mid-range or mildly reduced ejection fraction (HFmrEF) is classified as systolic heart failure.  Congestive heart failure with a recovered EF is classified as Diastolic Heart Failure.  Common clues to acute exacerbation:  Rapidly progressive symptoms (w/in 2 weeks of presentation), using IV diuretics, using supplemental O2, pulmonary edema on Xray, new or worsening pleural effusion, +JVD or other signs of volume overload, MI w/in 4 weeks, and/or BNP >500  The clinical guidelines noted are  only system guidelines, and do not replace the providers clinical judgment.    Provider, due to documentation conflict please clarify the Type and Acuity of Heart Failure     [ xx  ]  Chronic Diastolic Heart Failure (HFpEF) - preexisting and stable   [   ]  Chronic Combined Systolic and Diastolic Heart Failure - pre-existing and stable   [   ]  Other (please specify): ___________________________________       Please document in your progress notes daily for the duration of treatment until resolved and include in your discharge summary.    References:  American Heart Association editorial staff. (2017, May). Ejection Fraction Heart Failure Measurement. American Heart Association. https://www.heart.org/en/health-topics/heart-failure/diagnosing-heart-failure/ejection-fraction-heart-failure-measurement#:~:text=Ejection%20fraction%20(EF)%20is%20a,pushed%20out%20with%20each%20heartbeat  ALBERTO Mendoza (2020, December 15). Heart failure with preserved ejection fraction: Clinical manifestations and diagnosis. [employer]. https://www.[employer].com/contents/heart-failure-with-preserved-ejection-fraction-clinical-manifestations-and-diagnosis.  ICD-10-CM/PCS Coding Clinic Third Quarter ICD-10, Effective with discharges: September 8, 2020 [facility] § Heart failure with mid-range or mildly reduced ejection fraction (2020).  ICD-10-CM/PCS Coding Clinic Third Quarter ICD-10, Effective with discharges: September 8, 2020 [facility] § Heart failure with recovered ejection fraction (2020).  Form No. 16036

## 2023-11-16 NOTE — PHYSICIAN QUERY
PT Name: Cosme Lewis  MR #: [id number]     DOCUMENTATION CLARIFICATION     CDS/: Juan Manuel Brown Jr  RN CCDS             Contact information:[phone]  This form is a permanent document in the medical record.     Query Date: November 16, 2023    By submitting this query, we are merely seeking further clarification of documentation.  Please utilize your independent clinical judgment when addressing the question(s) below.    The Medical Record contains the following             Clinical Findings   Location in Medical Record   ESRD (end stage renal disease)  HD through LUE AV fistula - per chart review last HD on 10/30. Current chemistries stable. BUN/Cr appears at baseline  -Trend CMPs   -Strict I&Os  -Low threshold for emergent HD. Will need trialysis line for CRRT.     Organ dysfunction indicated by Acute kidney injury, Acute respiratory failure, Encephalopathy, and Thrombocytopenia        Latest Reference Range & Units 10/31/23 23:38 11/01/23 02:00 11/01/23 14:02 11/01/23 20:04 11/02/23 04:03 11/02/23 13:06 11/02/23 14:58   BUN 6 - 20 mg/dL 43 (H) 45 (H) 49 (H) 16 15  15 17   Creatinine 0.5 - 1.4 mg/dL 3.7 (H) 3.6 (H) 3.7 (H) 1.5 (H) 1.4  1.4 1.2   eGFR >60 mL/min/1.73 m^2 17.9 ! 18.5 ! 17.9 ! 53.0 ! 57.5 !  57.5 ! >60.0      Latest Reference Range & Units 11/02/23 21:29 11/03/23 03:26 11/05/23 03:08   BUN 6 - 20 mg/dL 9 10 65 (H)   Creatinine 0.5 - 1.4 mg/dL 0.7 0.8 2.0 (H)   eGFR >60 mL/min/1.73 m^2 >60.0 >60.0 37.5 !     (H): Data is abnormally high  !: Data is abnormal        11/1 H&P               11/5 Critical Care Medicine Progress Note         10/31-11/5 Labs                                                          [facility] approved diagnostic criteria for acute kidney injury is based on KDIGO criteria:     An increase in serum creatinine > 0.3mg/dl within 48 hours  OR  Increase in serum creatinine to > 1.5x baseline, which is known or presumed to have occurred within the prior 7  days  OR  Urine volume <0.5 ml/kg/hr for 6 hours         The clinical guidelines noted above are only a system guideline. It does not replace the providers clinical judgment.                      Acute Kidney Injury           has been confirmed as a diagnosis via query signed       11/15/2023 6190          Based on your medical judgment and in order to clinically support the documented diagnosis, please document the clinical indicators (signs, symptoms, & treatment) that were utilized to support this diagnosis:    [   ]  Please specify any signs, symptoms, and/or treatment To validate Acute Kidney Injury (Please Specify): ____________________   [   ]  Acute Kidney Injury is not confirmed and/or it has been ruled out   [ xx  ]  Other clarification (please specify): ______He does not have JESSICA. He is ESRD on HD. The change in Cr reflects dialysis. My apologies. _____________________________         Form No. [id number]

## 2023-11-20 NOTE — DISCHARGE SUMMARY
HPI:  58 year old male with medical history significant for COPD, HFrEF (45% and DD), ESRD on dialysis (MWF), HTN, DM who was discharged yesterday after long admission including ICU stay for acute hypoxemic respiratory failure of multfactorial etiology presenting to emergency room after out-of-hospital cardiac arrest where ROSC was achieved. Per chart review, patient was last known normal at 9:10 pm. He was found to be unresponsive an hour later by nursing home staff and CPR was started.  He appeared to have removed his Nightly BiPAP. Pink frothy sputum was noted on arrival per EMS and he was also found to be hypoglycemic with BG of 32. EMS achieved ROSC after about 35 minutes. He received 2 amps of D50, 5 rounds of epi, 1 bicarb, 1g calcium , 450mL normal saline, and 1 push dose epi.     Patient arrived to the emergency room intubated. Large bloody bowel movement was noted. Hypothermic to 90.5 F, Bradycardic but normotensive. CT pan scan pending. Critical care medicine was consulted for lower GI bleed, lactic acidosis and post-cardiac arrest management.      Hospital/ICU Course:  Pt was admitted to Critical Care for acute hypoxic respiratory failure 2/t cardiac arrest s/p ROSC. He remains intubated on mechanical ventilation. Minimal responsiveness concerning for ischemic brain injury. CT Head was negative for acute change. EEG pending. On levo to maintain MAPs >60. Multiple efforts made to contact pt's family without success. VM left. Code status ordered as DNR with two physician consent due to poor prognosis. Palliative Care consulted for assistance with contacting family and discussing GOC. Pt mom and son successfully contacted and informed of Mr Lewis's acute state of health. Cosme Lewis  was met by the medical team and GOC were discussed. He stated that he would prefer to discontinue invasive treatment and focus on comfort measures. This included RRT and unnecessary labs. Family would like to be given the  chance to visit the pt at bedside prior to extubation. Bcx positive for VRE, Dapto discontinued and IV linezolid started.  Patient was made comfort care by family members. Patient  on 23.

## 2024-01-04 NOTE — ASSESSMENT & PLAN NOTE
Diagnosed 10/2022  Repeat CT 3/2023 with resolution    -heparin stopped as patient's platelet level fell to 35,000. Has since recovered so home eliquis restarted   Show Aperture Variable?: Yes Consent: The patient's consent was obtained including but not limited to risks of crusting, scabbing, blistering, scarring, darker or lighter pigmentary change, recurrence, incomplete removal and infection. Post-Care Instructions: I reviewed with the patient in detail post-care instructions. Patient is to wear sunprotection, and avoid picking at any of the treated lesions. Pt may apply Vaseline to crusted or scabbing areas. Duration Of Freeze Thaw-Cycle (Seconds): 2 Render Post-Care Instructions In Note?: no Number Of Freeze-Thaw Cycles: 2 freeze-thaw cycles Detail Level: Simple

## 2024-02-04 NOTE — ASSESSMENT & PLAN NOTE
Patient with Hypoxic Respiratory failure which is Acute on chronic.  he is on home oxygen at 3 LPM. Supplemental oxygen was provided and noted- Oxygen Concentration (%):  [36] 36.   Signs/symptoms of respiratory failure include- increased work of breathing. Contributing diagnoses includes - Pleural effusion and pulmonary edema 2/2 volume overload and HTN Labs and images were reviewed. Patient Has not had a recent ABG. Will treat underlying causes and adjust management of respiratory failure as follows.    BNP elevated to 4802 and CXR with significant pulmonary edema and pleural effusions  Satting 95% on 6LNC when first arrived to ED  Patient subsequently desatted to 84% and was placed on 15L HFNC with improvement to mid 90's%.   Following HD, patient satting 95% on 4L NC    - increased O2 requirement from baseline, wean down O2 as tolerated  - hold antibiotics as patient just completed a course of CAP coverage  - nephrology consulted for emergent HD in the ED, appreciate assitance   no

## 2024-04-24 NOTE — SUBJECTIVE & OBJECTIVE
Nilda Bonner is a 52 y.o. year old female patient.  PCP = Teddy Huynh MD    Chief Complaint   Patient presents with    Vaginal Bleeding     Patient is here due to AUB. Patient states she is having a period every 2 weeks and her PCP checked her H&H and placed her on Iron.        HPI   Presents eating that over the last 2 months she has had irregular vaginal bleeding.  For January her menstrual flow was 2 weeks late and then in  she would have a period twice a month.  She states that 1 day of the flow will be very heavy.  She states that she has always had 1 day of her flow that would be heavy.  On  H&H was 10.3 and 32.3.  TSH was 2.53.  She had a pelvic ultrasound in 2022 showing a normal size uterus.    OB History          3    Para   2    Term   1       1    AB   1    Living   2         SAB   1    IAB   0    Ectopic   0    Multiple   0    Live Births   2                 Past Medical History:   Diagnosis Date    Encounter for  delivery without indication ([facility])     Delivery of pregnancy by  section    Encounter for gynecological examination (general) (routine) without abnormal findings     Pap test, as part of routine gynecological examination    Other conditions influencing health status     Menstruation    Personal history of other complications of pregnancy, childbirth and the puerperium     History of     Personal history of other medical treatment     History of mammogram       Past Surgical History:   Procedure Laterality Date    COLONOSCOPY  10/09/2023    Repeat in 10 years, performed by Dr. Wilde    OTHER SURGICAL HISTORY  2020     section       Review of Systems:   Constitutional: No fever or chills  Respiratory: No shortness of breath, or cough  Cardiovascular: No chest pain or syncope  Breasts: No breast pain, no masses, no nipple discharge  Gastrointestinal: No nausea, vomiting, or diarrhea, no abdominal  Interval History/Significant Events: No acute events overnight, afebrile. Elevated BP overnight so additional dose of Nifedipine dose administered and Carvedilol dose increased.       Review of Systems   Constitutional:  Negative for chills and fever.   HENT:  Negative for facial swelling and nosebleeds.    Respiratory:  Positive for shortness of breath (chronic(stable on NC)). Negative for cough.    Cardiovascular:  Negative for chest pain and leg swelling.   Gastrointestinal:  Negative for constipation, diarrhea, nausea and vomiting.   Genitourinary:  Positive for difficulty urinating (ESRD on HD).   Neurological:  Negative for dizziness and headaches.   Psychiatric/Behavioral:  Negative for sleep disturbance.      Objective:     Vital Signs (Most Recent):  Temp: 99 °F (37.2 °C) (08/23/23 0705)  Pulse: 63 (08/23/23 0900)  Resp: (!) 24 (08/23/23 0900)  BP: (!) 149/78 (08/23/23 0921)  SpO2: 100 % (08/23/23 0900) Vital Signs (24h Range):  Temp:  [97.6 °F (36.4 °C)-99 °F (37.2 °C)] 99 °F (37.2 °C)  Pulse:  [58-80] 63  Resp:  [17-47] 24  SpO2:  [93 %-100 %] 100 %  BP: (135-224)/() 149/78   Weight: 68.9 kg (151 lb 14.4 oz)  Body mass index is 22.43 kg/m².      Intake/Output Summary (Last 24 hours) at 8/23/2023 0938  Last data filed at 8/23/2023 0000  Gross per 24 hour   Intake 370 ml   Output 3500 ml   Net -3130 ml          Physical Exam  Vitals and nursing note reviewed.   Constitutional:       General: He is not in acute distress.     Appearance: He is not ill-appearing, toxic-appearing or diaphoretic.      Interventions: Nasal cannula in place.   HENT:      Head: Normocephalic and atraumatic.      Mouth/Throat:      Mouth: Mucous membranes are moist.   Eyes:      General: No scleral icterus.  Cardiovascular:      Rate and Rhythm: Normal rate and regular rhythm.      Pulses: Normal pulses.      Arteriovenous access: Left arteriovenous access is present.     Comments: LUE AVF with dressing clean/dry/intact. Bruit  "pain  Genitourinary: No dysuria or frequency  Gynecology: Negative except as noted in history of present illness  All other: All other systems reviewed and negative for complaint    Medication Documentation Review Audit       Reviewed by Boni Tapia MD (Physician) on 04/24/24 at 1152      Medication Order Taking? Sig Documenting Provider Last Dose Status   cholecalciferol (Vitamin D-3) 50 MCG (2000 UT) tablet [id number] Yes TAKE 1 BY MOUTH EVERY DAY Historical Provider, MD  Active   FeroSuL tablet [id number] Yes Take 1 tablet by mouth. Historical Provider, MD  Active   levothyroxine (Synthroid, Levoxyl) 75 mcg tablet [id number]  Levothyroxine Sodium 75 MCG Oral Tablet   Quantity: 90  Refills: 0        Start : 24-Feb-2020   Active Historical Provider, MD  Active                     /76   Ht 1.651 m (5' 5\")   Wt 83 kg (183 lb)   BMI 30.45 kg/m²     PHYSICAL EXAMINATION:  Well-developed, well nourished, in no acute distress, alert and oriented x three, is pleasant and cooperative.  HEENT: Clear. Pupils equal, round and reactive to light and accommodation. Extraocular muscles are intact. Oral mucosa pink without exudate.   NECK: No lymphadenopathy, no thyromegaly.  LUNGS: Clear bilaterally.  HEART: Regular rate and rhythm without murmurs.  ABDOMEN: Normoactive bowel sounds, soft and nontender, no guarding or rebound tenderness, no CVA tenderness.  EXTREMITIES: No clubbing, cyanosis or edema.  NEUROLOGIC:  Cranial nerves II-XII grossly intact.  :  Normal external female genitalia, normal vulva, normal vagina. Normal urethral meatus, urethra and bladder. Normal appearing cervix. Normal-sized uterus, no adnexal masses or tenderness.  Light of her irregular bleeding, recommend endometrial biopsy which she agrees to.  Cervix was cleansed with Betadine.  Uterus sounded to 9 cm.  Endometrial biopsy obtained with Pipelle.  Patient tolerated procedure well.      No orders of the defined types were placed in this " and thrill present.   Pulmonary:      Effort: Pulmonary effort is normal. No respiratory distress.      Breath sounds: No wheezing or rales.   Abdominal:      General: There is distension.      Palpations: Abdomen is soft.      Tenderness: There is no abdominal tenderness. There is no guarding or rebound.   Musculoskeletal:      Right lower leg: No edema.      Left lower leg: No edema.   Skin:     General: Skin is warm and dry.      Coloration: Skin is not jaundiced.   Neurological:      Mental Status: He is alert and oriented to person, place, and time. Mental status is at baseline.   Psychiatric:         Mood and Affect: Mood normal.         Behavior: Behavior normal.            Vents:     Lines/Drains/Airways       Peripheral Intravenous Line  Duration                  Hemodialysis AV Fistula Left upper arm -- days         Peripheral IV - Single Lumen 08/19/23 2131 22 G Posterior;Right Hand 3 days         Peripheral IV - Single Lumen 08/20/23 0230 20 G Anterior;Right Forearm 3 days                    Significant Labs: All pertinent labs within the past 24 hours have been reviewed.  LABS:  Recent Labs   Lab 08/21/23  0852 08/22/23  0430 08/23/23  0454   * 135* 133*   K 5.0 4.5 4.6   CL 95 102 101   CO2 27 24 22*   BUN 48* 29* 23*   CREATININE 4.7* 3.7* 2.9*   * 134* 133*   ANIONGAP 11 9 10     Recent Labs   Lab 08/21/23  0402 08/22/23  0430 08/23/23  0454   MG 2.1 2.1 2.0   PHOS 3.6 3.3 3.4     Recent Labs   Lab 08/21/23  0402 08/22/23  0430 08/23/23  0454   AST 26 20 20   ALT 16 8* 6*   ALKPHOS 154* 155* 156*   BILITOT 0.6 0.7 0.7   ALBUMIN 2.8* 2.9* 3.0*     POCT Glucose:   Recent Labs   Lab 08/22/23  2227 08/23/23  0137 08/23/23  0726   POCTGLUCOSE 101 140* 180*    Recent Labs   Lab 08/21/23  1416 08/22/23  0430 08/23/23  0454   WBC 6.15 4.44 5.94   HGB 9.0* 8.7* 9.2*   HCT 28.6* 28.7* 29.6*    164 155   GRAN 74.1*  4.6 63.9  2.8 77.5*  4.6              Significant Imaging: I have  encounter.       Problem List Items Addressed This Visit    None  Visit Diagnoses       Abnormal uterine bleeding (AUB)        Relevant Orders    Surgical Pathology Exam             Provider Impression:  1.  Abnormal uterine bleeding  2.  Endometrial biopsy performed today  Patient to return in 10 days to review biopsy results.     reviewed all pertinent imaging results/findings within the past 24 hours.  US Extremity Non Vascular Limited Left   Final Result      As above.      Electronically signed by resident: Ray Renner   Date:    08/20/2023   Time:    09:34      Electronically signed by: Hal Garcia Jr   Date:    08/20/2023   Time:    09:41      X-Ray Chest AP Portable   Final Result      Please see above.         Electronically signed by: Yakelin Holm MD   Date:    08/19/2023   Time:    22:26          Inpatient Medications:  Continuous Infusions:  Scheduled Meds:   apixaban  5 mg Oral BID    aspirin  81 mg Oral Daily    atorvastatin  40 mg Oral Daily    carvediloL  12.5 mg Oral BID    cloNIDine 0.3 mg/24 hr td ptwk  1 patch Transdermal Q7 Days    FLUoxetine  40 mg Oral Daily    fluticasone furoate-vilanteroL  1 puff Inhalation Daily    insulin aspart U-100  4 Units Subcutaneous TIDWM    insulin detemir U-100  5 Units Subcutaneous BID    isosorbide mononitrate  30 mg Oral Daily    lisinopriL  40 mg Oral QHS    mupirocin   Nasal BID    NIFEdipine  60 mg Oral BID    oxymetazoline  2 spray Each Nostril Q8H    sevelamer carbonate  800 mg Oral TID    sodium chloride  1 spray Each Nostril Q4H    tiotropium bromide  2 puff Inhalation Daily    white petrolatum   Topical (Top) BID     PRN Meds:sodium chloride 0.9%, acetaminophen, dextrose 10%, dextrose 10%, glucagon (human recombinant), glucose, glucose, hydrALAZINE, insulin aspart U-100, labetalol, melatonin, ondansetron, QUEtiapine, sodium chloride 0.9%

## 2024-11-05 NOTE — ASSESSMENT & PLAN NOTE
Secondary to htn urgency, pulmonary edema, wean fio2,  HD scheduled   HGB 14.1 10/05/2011 10:05 AM    HCT 43.3 10/05/2011 10:05 AM    MCV 96 10/05/2011 10:05 AM    RDW 14.4 10/05/2011 10:05 AM     10/05/2011 10:05 AM     CMP:    Lab Results   Component Value Date/Time     10/05/2011 10:05 AM    K 4.3 10/05/2011 10:05 AM     10/05/2011 10:05 AM    CO2 23 10/05/2011 10:05 AM    BUN 11 10/05/2011 10:05 AM    CREATININE 0.97 10/05/2011 10:05 AM    GFRAA 103 10/05/2011 10:05 AM    AGRATIO 2.0 10/05/2011 10:05 AM    LABGLOM 89 10/05/2011 10:05 AM    GLUCOSE 97 10/05/2011 10:05 AM    CALCIUM 9.3 10/05/2011 10:05 AM    BILITOT 1.2 10/05/2011 10:05 AM    ALKPHOS 78 10/05/2011 10:05 AM    AST 35 10/05/2011 10:05 AM    ALT 28 10/05/2011 10:05 AM     PT/INR:  No results found for: \"PTINR\"  No results found for: \"CKTOTAL\", \"CKMB\", \"CKMBINDEX\", \"TROPONINI\"    EKG: none recent      GXT stress  10/24/24  Interpetation Summary:        o Positive ECG for ischemia with graded exercise test.      o Duke Treadmill Score is -2 which indicates intermediate risk.     Stress Echo  12/4/19  o Duke Treadmill Score is 13 which indicates low risk.      o Negative stress echo with no indication of inducible ischemia.    Baseline Echo Findings: The LV ejection fraction at rest is 55-60%. All   segments are normal. Normal LV chamber size and thickness.     Post Stress Echo Findings: The LV ejection fraction post stress is 60-65%.   Normal increase in contractillity of all segments was observed. The left   ventricular wall motion is normal.     Cath: none    MRI/EP/Other: none    Old notes reviewed  Telemetry reviewd  EKG personally reviewed  CXR personally reviewed  Echo, cath, and medications and labs reviewed  High complexity/medical decision making due to extensive data review, extensive history review, independent review of data  High risk due to acute illness, evaluation of drug-drug interactions, medication management and diagnostic interventions    Assessment  Patient Active Problem List

## 2025-02-14 NOTE — PROGRESS NOTES
[facility] - [facility] ([address] Medicine  Progress Note    Patient Name: Cosme Lewis  MRN: [id number]  Patient Class: IP- Inpatient   Admission Date: 3/31/2023  Length of Stay: 2 days  Attending Physician: Sheila Kaiser*  Primary Care Provider: Primary Doctor No        Subjective:     Principal Problem:Diabetic ketoacidosis without coma        HPI:  Cosme Lewis is a 57 y.o. M with history of ESRD on HD, DM, HTN, COPD who presents with epistaxis. Was recently hospitalized for epistaxis and HTN urgency and discharged 3/29/23, required MICU on cardene gtt. Reports epistaxis never resolved. Denies dizziness or syncope. Denies hematuria, melena, hematochezia. Went to dialysis today and before could be dialyzed, was sent to ED due to epistaxis. On arrival, glu on istat >700. Other labs pending. Reports eating candies, possibly including two bags of skittles today. Denies missing doses of insulin or oral antihyperglycemics as he lives at nursing home and they administer all of his medications regularly. Hgb 8.6, up from 6.6 two days ago. Hemodynamically stable. SpO2 90% on 5 L O2. Does have COPD. CXR reveals pulmonary edema. Plan for dialysis today. Patient admitted to [facility] for management of DKA.       Overview/Hospital Course:  Pt admitted for DKA and was placed on insulin infusion. Overnight pts BG was under 100 with infusion stopped. Placed on D5 half normal with 20 KCL for the rest of the night. Morning labs showed closed gap with normalizing bicarb. BG now within range at 145. Switched back to normal home regimen with monitored food intake. Plan to discharge back to nursing home if BG remain within acceptable range. Pt not hypertensive during admission, but was slightly dyspneic. Likely needing repeat dialysis once discharged. Pt having significant instability in blood sugar readings. Pt down to 44 overnight with new abd pain and diarrhea. After small correction with juice and breakfast  patient up to 199. Will continue to titrate insulin regimen. Scheduled to receive additional dialysis session before discharge.       Interval History: Pt BG down to 44 this morning. Diarrhea and abdominal pain. Obtained lactate and lipse which were both normal. After juice and breakfast patient up to 199 BG.     Review of Systems   Constitutional:  Negative for activity change, appetite change, chills, fatigue, fever and unexpected weight change.   HENT:  Negative for trouble swallowing and voice change.    Eyes:  Positive for pain and redness. Negative for visual disturbance.   Respiratory:  Negative for cough, chest tightness, shortness of breath and wheezing.    Cardiovascular:  Negative for chest pain, palpitations and leg swelling.   Gastrointestinal:  Negative for abdominal distention, abdominal pain, anal bleeding, blood in stool, constipation, diarrhea, nausea, rectal pain and vomiting.   Endocrine: Negative.    Genitourinary: Negative.    Musculoskeletal:  Negative for back pain, neck pain and neck stiffness.   Skin:  Negative for pallor, rash and wound.   Allergic/Immunologic: Negative.    Neurological:  Negative for dizziness, seizures, syncope, weakness, light-headedness, numbness and headaches.   Hematological: Negative.    Psychiatric/Behavioral:  Negative for confusion and sleep disturbance. The patient is not nervous/anxious.    All other systems reviewed and are negative.  Objective:     Vital Signs (Most Recent):  Temp: 97.7 °F (36.5 °C) (04/02/23 1100)  Pulse: 62 (04/02/23 1120)  Resp: 19 (04/02/23 1100)  BP: (!) 178/83 (04/02/23 1100)  SpO2: 96 % (04/02/23 1120)   Vital Signs (24h Range):  Temp:  [97.1 °F (36.2 °C)-98.4 °F (36.9 °C)] 97.7 °F (36.5 °C)  Pulse:  [60-71] 62  Resp:  [11-20] 19  SpO2:  [92 %-100 %] 96 %  BP: (112-178)/(69-83) 178/83     Weight: 76.2 kg (168 lb)  Body mass index is 24.81 kg/m².    Intake/Output Summary (Last 24 hours) at 4/2/2023 1224  Last data filed at 4/2/2023  0912  Gross per 24 hour   Intake 600 ml   Output 0 ml   Net 600 ml      Physical Exam  Vitals and nursing note reviewed.   Constitutional:       General: He is awake. He is not in acute distress.     Appearance: He is ill-appearing. He is not diaphoretic.      Interventions: Nasal cannula in place.   HENT:      Head: Normocephalic and atraumatic.      Right Ear: External ear normal.      Left Ear: External ear normal.      Nose: Nose normal.      Mouth/Throat:      Mouth: Mucous membranes are dry.      Pharynx: Oropharynx is clear. No oropharyngeal exudate or posterior oropharyngeal erythema.      Comments: Poor dentition.  Eyes:      General: No scleral icterus.        Right eye: No discharge.         Left eye: No discharge.      Extraocular Movements: Extraocular movements intact.      Comments: Left conjunctival hemorrhage noted.    Cardiovascular:      Rate and Rhythm: Normal rate.      Heart sounds: Murmur heard.   Systolic murmur is present with a grade of 1/6.     No friction rub. No gallop.      Arteriovenous access: Left arteriovenous access is present.     Comments: Left upper extremity AVF with palpable thrill and audible bruit.  Pulmonary:      Effort: Pulmonary effort is normal. No respiratory distress.      Breath sounds: No wheezing, rhonchi or rales.   Abdominal:      General: Bowel sounds are normal. There is no distension.      Palpations: Abdomen is soft.      Tenderness: There is no abdominal tenderness.   Musculoskeletal:      Cervical back: Neck supple.      Right lower leg: No edema.      Left lower leg: No edema.   Skin:     General: Skin is warm and dry.      Coloration: Skin is not jaundiced.      Findings: Bruising present.   Neurological:      General: No focal deficit present.      Mental Status: He is alert. Mental status is at baseline.      Cranial Nerves: No cranial nerve deficit.      Comments: Grossly hard of hearing.   Psychiatric:         Mood and Affect: Mood normal.          Behavior: Behavior normal. Behavior is cooperative.       Significant Labs: All pertinent labs within the past 24 hours have been reviewed.    Significant Imaging: I have reviewed all pertinent imaging results/findings within the past 24 hours.      Assessment/Plan:      * Diabetic ketoacidosis without coma   DKA without coma resolved  -- Also found to have recurrent epistaxis, hgb improved since last to 8.6, hemodynamically stable  -- Pulmonary edema with SpO2 90% on 5 L in s/o COPD on oxygen at NH (unknown amount)    BG now 145, gap closed with bicarb wnl.     Recommendations:  - Insulin home regimen  - q4h POCT glucose checks  - Mechanical soft diet  - Dialysis per nephrology and wean O2 as tolerated  - Monitor hgb and hemodynamics  - KUB flat and erect for abd pain  - lipase  - lactate    ESRD on hemodialysis  Admitted for epistaxis and DKA. Chest x ray with bilateral edema. Echo on 3/20 EF 55%, CVP 15    Outpatient HD Information:     -HD tx days: MWF   -HD tx time: 4hrs   -HD access: LUE AVF   -HD modality: iHD   -Residual urine: Anuric      HD planned for 3/31 but was cancelled for epistaxis     Plan:  - HD per nephrology  -Diabetic/renal diet, if not NPO   -Strict I/O's and daily weights  -Daily renal function panels   -Renally dose meds    COPD (chronic obstructive pulmonary disease)  Not currently in exacerbation   On 5L NC at home  No wheezing on exam    Plan:  - Duo-nebs q6hr PRN  - Home LABA/ICS  - Wean supplemental O2 with goal SpO2 88-92%  - Emphasized importance of smoking cessation        Anemia in ESRD (end-stage renal disease)  Daily CBC; transfuse if Hgb <7    Chronic diastolic heart failure    NYHA II-III  acutely decompensated    Results for orders placed during the hospital encounter of 03/16/23    Echo    Interpretation Summary  · The left ventricle is normal in size with normal systolic function.  · The estimated ejection fraction is 55%.  · Grade II left ventricular diastolic  dysfunction.  · Moderate right ventricular enlargement with moderately reduced right ventricular systolic function.  · Biatrial enlargement.  · Interatrial septum bows to left, consider elevated right atrial pressure.  · Mild tricuspid regurgitation.  · The estimated PA systolic pressure is 54 mmHg.  · There is pulmonary hypertension.  · Elevated central venous pressure (15 mmHg).  · Small circumferential pericardial effusion.  · There are bilateral pleural effusions.  · There is ascites present.       Home GDMT/Diuretic:  - Beta Blocker: Carvedilol 3.125  - SGLT2: Contraindicated  - Diuretic: Anuric on iHD    CXR BL edema       Plan:  - Volume removal via HD   - SGLT2i not appropriate 2/2 DKA and renal failure  - Daily weights (standing if tolerated)  - Strict I/Os  - Fluid restriction at 1500mL  - Diabetic diet  - Maintain on telemetry and daily EKGs, maintain Mag >2 & K+ >4      Chronic kidney disease-mineral and bone disorder  Per nephro      Chronic hypoxemic respiratory failure  Patient with Hypoxic Respiratory failure which is Acute on chronic.  he is on home oxygen at 5 LPM.  Signs/symptoms of respiratory failure include- tachypnea and increased work of breathing. Contributing diagnoses includes - COPD Labs and images were reviewed. Patient Has recent ABG, which has been reviewed. Will treat underlying causes and adjust management of respiratory failure as follows:  Chest xray with appearance of volume overload; rales on exam.     --Nephrology consulted for HD; volume removal  --BP control; Cardene drip  --Wean FiO2 for SpO2>88%, now on room air  --Home inhalers; PRN nebs      VTE Risk Mitigation (From admission, onward)         Ordered     Reason for no Mechanical VTE Prophylaxis  Once        Question:  Reasons:  Answer:  Physician Provided (leave comment)  Comment:  eliquis    03/31/23 1521     IP VTE HIGH RISK PATIENT  Once         03/31/23 1521     Place sequential compression device  Until discontinued          03/31/23 1515                Discharge Planning   GERA: 4/3/2023     Code Status: Full Code   Is the patient medically ready for discharge?: No    Reason for patient still in hospital (select all that apply): Patient unstable and Treatment             Yash De La O MD  Department of Hospital Medicine   [facility] - Intensive Care ([place]-[facility])     170.18

## (undated) DEVICE — SUT 3-0 MONOCRYL PLUS PS-2

## (undated) DEVICE — COVERS PROBE NR-48 STERILE

## (undated) DEVICE — HEMOSTAT VASC BAND REG 24CM

## (undated) DEVICE — CATH GLIDE ANGLED 5FR 65CM

## (undated) DEVICE — SOL NS 1000CC

## (undated) DEVICE — APPLICATOR CHLORAPREP ORN 26ML

## (undated) DEVICE — INFLATOR ENCORE

## (undated) DEVICE — GUIDEWIRE STF .035X180CM ANG

## (undated) DEVICE — DRAPE U SPLIT SHEET 54X76IN

## (undated) DEVICE — SET MICROPUNCTURE

## (undated) DEVICE — KIT GLIDESHEATH SLEND 6FR 10CM

## (undated) DEVICE — DRAPE PED LAP SURG 108X77IN

## (undated) DEVICE — Device

## (undated) DEVICE — DECANTER FLUID TRNSF WHITE 9IN